# Patient Record
Sex: FEMALE | Race: WHITE | NOT HISPANIC OR LATINO | Employment: OTHER | URBAN - METROPOLITAN AREA
[De-identification: names, ages, dates, MRNs, and addresses within clinical notes are randomized per-mention and may not be internally consistent; named-entity substitution may affect disease eponyms.]

---

## 2017-01-02 ENCOUNTER — PATIENT MESSAGE (OUTPATIENT)
Dept: FAMILY MEDICINE | Facility: CLINIC | Age: 62
End: 2017-01-02

## 2017-01-04 ENCOUNTER — OFFICE VISIT (OUTPATIENT)
Dept: FAMILY MEDICINE | Facility: CLINIC | Age: 62
End: 2017-01-04
Payer: COMMERCIAL

## 2017-01-04 ENCOUNTER — LAB VISIT (OUTPATIENT)
Dept: LAB | Facility: HOSPITAL | Age: 62
End: 2017-01-04
Attending: FAMILY MEDICINE
Payer: COMMERCIAL

## 2017-01-04 VITALS
BODY MASS INDEX: 33.76 KG/M2 | OXYGEN SATURATION: 94 % | TEMPERATURE: 98 F | HEIGHT: 62 IN | WEIGHT: 183.44 LBS | RESPIRATION RATE: 20 BRPM | HEART RATE: 103 BPM

## 2017-01-04 DIAGNOSIS — E11.9 TYPE 2 DIABETES MELLITUS WITHOUT COMPLICATION, WITHOUT LONG-TERM CURRENT USE OF INSULIN: ICD-10-CM

## 2017-01-04 DIAGNOSIS — E11.9 TYPE 2 DIABETES MELLITUS WITHOUT COMPLICATION: ICD-10-CM

## 2017-01-04 DIAGNOSIS — J44.1 ACUTE EXACERBATION OF CHRONIC OBSTRUCTIVE PULMONARY DISEASE (COPD): Primary | ICD-10-CM

## 2017-01-04 DIAGNOSIS — Z23 NEED FOR VACCINATION WITH 13-POLYVALENT PNEUMOCOCCAL CONJUGATE VACCINE: ICD-10-CM

## 2017-01-04 DIAGNOSIS — Z12.11 COLON CANCER SCREENING: ICD-10-CM

## 2017-01-04 DIAGNOSIS — Z01.419 ROUTINE GYNECOLOGICAL EXAMINATION: ICD-10-CM

## 2017-01-04 DIAGNOSIS — I10 ESSENTIAL HYPERTENSION: ICD-10-CM

## 2017-01-04 LAB
CHOLEST/HDLC SERPL: 4.4 {RATIO}
HDL/CHOLESTEROL RATIO: 22.8 %
HDLC SERPL-MCNC: 224 MG/DL
HDLC SERPL-MCNC: 51 MG/DL
LDLC SERPL CALC-MCNC: 143.8 MG/DL
NONHDLC SERPL-MCNC: 173 MG/DL
TRIGL SERPL-MCNC: 146 MG/DL

## 2017-01-04 PROCEDURE — 3044F HG A1C LEVEL LT 7.0%: CPT | Mod: S$GLB,,, | Performed by: FAMILY MEDICINE

## 2017-01-04 PROCEDURE — 99214 OFFICE O/P EST MOD 30 MIN: CPT | Mod: 25,S$GLB,, | Performed by: FAMILY MEDICINE

## 2017-01-04 PROCEDURE — 4010F ACE/ARB THERAPY RXD/TAKEN: CPT | Mod: S$GLB,,, | Performed by: FAMILY MEDICINE

## 2017-01-04 PROCEDURE — 90471 IMMUNIZATION ADMIN: CPT | Mod: 59,S$GLB,, | Performed by: FAMILY MEDICINE

## 2017-01-04 PROCEDURE — 1159F MED LIST DOCD IN RCRD: CPT | Mod: S$GLB,,, | Performed by: FAMILY MEDICINE

## 2017-01-04 PROCEDURE — 99999 PR PBB SHADOW E&M-EST. PATIENT-LVL III: CPT | Mod: PBBFAC,,, | Performed by: FAMILY MEDICINE

## 2017-01-04 PROCEDURE — 80061 LIPID PANEL: CPT

## 2017-01-04 PROCEDURE — 83036 HEMOGLOBIN GLYCOSYLATED A1C: CPT

## 2017-01-04 PROCEDURE — 2022F DILAT RTA XM EVC RTNOPTHY: CPT | Mod: S$GLB,,, | Performed by: FAMILY MEDICINE

## 2017-01-04 PROCEDURE — 36415 COLL VENOUS BLD VENIPUNCTURE: CPT

## 2017-01-04 PROCEDURE — 90670 PCV13 VACCINE IM: CPT | Mod: S$GLB,,, | Performed by: FAMILY MEDICINE

## 2017-01-04 RX ORDER — PREDNISONE 10 MG/1
TABLET ORAL
Refills: 0 | COMMUNITY
Start: 2016-12-30 | End: 2017-01-04

## 2017-01-04 RX ORDER — HYDROCODONE BITARTRATE AND ACETAMINOPHEN 5; 325 MG/1; MG/1
TABLET ORAL
Refills: 0 | COMMUNITY
Start: 2016-12-30 | End: 2017-03-23 | Stop reason: ALTCHOICE

## 2017-01-04 RX ORDER — BENZONATATE 100 MG/1
CAPSULE ORAL
Refills: 1 | COMMUNITY
Start: 2016-12-30 | End: 2017-03-23 | Stop reason: ALTCHOICE

## 2017-01-04 RX ORDER — FLUTICASONE PROPIONATE 50 MCG
SPRAY, SUSPENSION (ML) NASAL
Refills: 0 | COMMUNITY
Start: 2016-12-30 | End: 2017-03-23

## 2017-01-04 RX ORDER — PREDNISONE 20 MG/1
60 TABLET ORAL DAILY
Qty: 15 TABLET | Refills: 0 | Status: SHIPPED | OUTPATIENT
Start: 2017-01-04 | End: 2017-01-09

## 2017-01-04 NOTE — MR AVS SNAPSHOT
[facility]  [address]  [place] [address]  Phone: [phone]                  Hannah Montoya   2017 7:30 AM   Office Visit    Description:  Female : 1955   Provider:  Emanuel Chavez MD   Department:  [facility]           Reason for Visit     Urgent Care Follow-up           Diagnoses this Visit        Comments    Acute exacerbation of chronic obstructive pulmonary disease (COPD)    -  Primary     Essential hypertension         Type 2 diabetes mellitus without complication, without long-term current use of insulin         Colon cancer screening         Need for vaccination with 13-polyvalent pneumococcal conjugate vaccine         Routine gynecological examination                To Do List           Future Appointments        Provider Department Dept Phone    2017 9:00 AM LAB, [facility] DRAW STATION [facility] [phone]      Goals (5 Years of Data)     None      Follow-Up and Disposition     Return in about 3 months (around 2017), or if symptoms worsen or fail to improve.       These Medications        Disp Refills Start End    predniSONE (DELTASONE) 20 MG tablet 15 tablet 0 2017    Take 3 tablets (60 mg total) by mouth once daily. - Oral    Pharmacy: [facility] [place] [address] #: [phone]         [facility] On Call     [facility] On Call Nurse Care Line -  Assistance  Registered nurses in the [facility] On Call Center provide clinical advisement, health education, appointment booking, and other advisory services.  Call for this free service at [phone].             Medications           Message regarding Medications     Verify the changes and/or additions to your medication regime listed below are the same as discussed with your clinician today.  If any of these changes or additions are incorrect, please notify your healthcare provider.         START taking these NEW medications        Refills    predniSONE (DELTASONE) 20 MG tablet 0    Sig: Take 3 tablets (60 mg total) by mouth once daily.    Class: Normal    Route: Oral      STOP taking these medications     NICOTROL 10 mg Crtg inhale 1 PUFF into the lungs AS NEEDED    ondansetron (ZOFRAN-ODT) 4 MG TbDL Take 1 tablet (4 mg total) by mouth every 6 (six) hours as needed.           Verify that the below list of medications is an accurate representation of the medications you are currently taking.  If none reported, the list may be blank. If incorrect, please contact your healthcare provider. Carry this list with you in case of emergency.           Current Medications     albuterol (ACCUNEB) 1.25 mg/3 mL Nebu Use 1 vial IN NEBULIZER EVERY 6 HOURS AS NEEDED    albuterol-ipratropium 2.5mg-0.5mg/3mL (DUO-NEB) 0.5 mg-3 mg(2.5 mg base)/3 mL nebulizer solution     alprazolam (XANAX) 0.25 MG tablet TAKE 1 TABLET(0.25 MG) BY MOUTH DAILY AS NEEDED FOR ANXIETY    amlodipine (NORVASC) 5 MG tablet TAKE ONE TABLET BY MOUTH EVERY DAY    atorvastatin (LIPITOR) 10 MG tablet TAKE ONE TABLET BY MOUTH DAILY    azithromycin (ZITHROMAX Z-RICKY) 250 MG tablet Take 2 pills day 1, then 1 pill day 2-5.    benzonatate (TESSALON) 100 MG capsule     budesonide (PULMICORT FLEXHALER) 90 mcg/actuation AePB Inhale 2 puffs (180 mcg total) into the lungs 2 (two) times daily.    fluticasone (FLONASE) 50 mcg/actuation nasal spray USE 2 SPR IN EACH NOSTRIL D    hydrocodone-acetaminophen 5-325mg (NORCO) 5-325 mg per tablet TK 1 T PO Q 6 H PRN P OR COUGH    lisinopril-hydrochlorothiazide (PRINZIDE,ZESTORETIC) 20-12.5 mg per tablet TAKE TWO TABLETS BY MOUTH EVERY DAY    metformin (GLUCOPHAGE) 500 MG tablet TAKE ONE TABLET BY MOUTH TWICE DAILY    NTS STEP 1 21 mg/24 hr PLACE 1 PATCH ONTO THE SKIN ONCE DAILY.    polyethylene glycol (COLYTE) 240-22.72-6.72 -5.84 gram SolR Take 4,000 mLs (4 L total) by mouth as directed.    PROAIR HFA 90 mcg/actuation  "inhaler INHALE 2 PUFFS BY MOUTH EVERY 6 HOURS AS NEEDED FOR WHEEZING    sertraline (ZOLOFT) 100 MG tablet TAKE ONE Tablet BY MOUTH EVERY EVENING    STIOLTO RESPIMAT 2.5-2.5 mcg/actuation Mist USE 2 PUFFS BY MOUTH DAILY    TRUETEST TEST STRIPS Strp test once EVERY MORNING    predniSONE (DELTASONE) 20 MG tablet Take 3 tablets (60 mg total) by mouth once daily.           Clinical Reference Information           Vital Signs - Last Recorded  Most recent update: 1/4/2017  7:36 AM by Vielka Puente MA    Pulse Temp Resp Ht Wt LMP    103 97.7 °F (36.5 °C) (Oral) 20 5' 2" (1.575 m) 83.2 kg (183 lb 6.8 oz) (LMP Unknown)    SpO2 BMI             (!) 94% 33.55 kg/m2         Allergies as of 1/4/2017     No Known Allergies      Immunizations Administered on Date of Encounter - 1/4/2017     Name Date Dose VIS Date Route    Pneumococcal Conjugate - 13 Valent 1/4/2017 0.5 mL 11/5/2015 Intramuscular      Orders Placed During Today's Visit      Normal Orders This Visit    Ambulatory referral to Obstetrics / Gynecology     Case request GI: COLONOSCOPY     Hemoglobin A1c     Microalbumin/creatinine urine ratio     Pneumococcal Conjugate Vaccine (13 Valent) (IM)       "

## 2017-01-04 NOTE — PROGRESS NOTES
Routine Office Visit    Patient Name: Hannah Montoya    : 1955  MRN: [id number]    Subjective:  Hannah is a 61 y.o. female who presents today for:    1. COPD exacerbation  Patient continues to have wheezing and shortness of breath after being treated ineffectively by urgent care.  She reports getting a shot of abx, a shot of steroid, a zpack, and 10mg prednisone daily for 5 days.  She has not had any fever.  She has been using inhalers as prescribed.  Patient has no shortness of breath at rest, but does have it with exertion.  She denies a productive cough at this time.  She reports having flu shot this year.  She is a reformed smoker, but is exposed to second hand smoke at home daily.        Past Medical History  Past Medical History   Diagnosis Date    Breast cancer     COPD (chronic obstructive pulmonary disease)     Diabetes mellitus, type 2     Hyperglycemia     Hyperlipidemia        Past Surgical History  Past Surgical History   Procedure Laterality Date    Cholecystectomy       section       x2    Tubal ligation      Breast biopsy Right 2016     [facility]    Mastectomy w/ sentinel node biopsy      Breast surgery         Family History  Family History   Problem Relation Age of Onset    Kidney disease Mother     Kidney disease Father     Clotting disorder Brother     Breast cancer Neg Hx     Ovarian cancer Neg Hx        Social History  Social History     Social History    Marital status:      Spouse name: N/A    Number of children: N/A    Years of education: N/A     Occupational History     [employer]     Social History Main Topics    Smoking status: Former Smoker     Packs/day: 0.50     Years: 40.00     Quit date: 2016    Smokeless tobacco: Not on file    Alcohol use No      Comment: rare- holiday    Drug use: No    Sexual activity: Not on file     Other Topics Concern    Not on file     Social History Narrative       Current Medications  Current  Outpatient Prescriptions on File Prior to Visit   Medication Sig Dispense Refill    albuterol (ACCUNEB) 1.25 mg/3 mL Nebu Use 1 vial IN NEBULIZER EVERY 6 HOURS AS NEEDED 300 mL 6    albuterol-ipratropium 2.5mg-0.5mg/3mL (DUO-NEB) 0.5 mg-3 mg(2.5 mg base)/3 mL nebulizer solution       alprazolam (XANAX) 0.25 MG tablet TAKE 1 TABLET(0.25 MG) BY MOUTH DAILY AS NEEDED FOR ANXIETY 20 tablet 0    amlodipine (NORVASC) 5 MG tablet TAKE ONE TABLET BY MOUTH EVERY DAY 30 tablet 0    atorvastatin (LIPITOR) 10 MG tablet TAKE ONE TABLET BY MOUTH DAILY 30 tablet 6    azithromycin (ZITHROMAX Z-RICKY) 250 MG tablet Take 2 pills day 1, then 1 pill day 2-5. 6 tablet 0    budesonide (PULMICORT FLEXHALER) 90 mcg/actuation AePB Inhale 2 puffs (180 mcg total) into the lungs 2 (two) times daily. 1 each 6    lisinopril-hydrochlorothiazide (PRINZIDE,ZESTORETIC) 20-12.5 mg per tablet TAKE TWO TABLETS BY MOUTH EVERY DAY 60 tablet 0    metformin (GLUCOPHAGE) 500 MG tablet TAKE ONE TABLET BY MOUTH TWICE DAILY 60 tablet 0    NTS STEP 1 21 mg/24 hr PLACE 1 PATCH ONTO THE SKIN ONCE DAILY. 28 patch 0    polyethylene glycol (COLYTE) 240-22.72-6.72 -5.84 gram SolR Take 4,000 mLs (4 L total) by mouth as directed. 1 Bottle 0    PROAIR HFA 90 mcg/actuation inhaler INHALE 2 PUFFS BY MOUTH EVERY 6 HOURS AS NEEDED FOR WHEEZING 17 g 2    sertraline (ZOLOFT) 100 MG tablet TAKE ONE Tablet BY MOUTH EVERY EVENING 90 tablet 1    STIOLTO RESPIMAT 2.5-2.5 mcg/actuation Mist USE 2 PUFFS BY MOUTH DAILY 4 g 0    TRUETEST TEST STRIPS Strp test once EVERY MORNING 90 each 3    [DISCONTINUED] NICOTROL 10 mg Crtg inhale 1 PUFF into the lungs AS NEEDED 168 each 1    [DISCONTINUED] ondansetron (ZOFRAN-ODT) 4 MG TbDL Take 1 tablet (4 mg total) by mouth every 6 (six) hours as needed. 20 tablet 0     No current facility-administered medications on file prior to visit.        Allergies   Review of patient's allergies indicates:  No Known Allergies    Review of  "Systems (Pertinent positives)  Review of Systems   Constitutional: Negative.    HENT: Negative.    Eyes: Negative.    Respiratory: Positive for cough, shortness of breath and wheezing. Negative for hemoptysis and sputum production.    Cardiovascular: Negative.    Gastrointestinal: Negative.    Genitourinary: Negative.    Musculoskeletal: Negative.    Skin: Negative.    Neurological: Negative.          Visit Vitals    Pulse 103    Temp 97.7 °F (36.5 °C) (Oral)    Resp 20    Ht 5' 2" (1.575 m)    Wt 83.2 kg (183 lb 6.8 oz)    LMP  (LMP Unknown)    SpO2 (!) 94%    BMI 33.55 kg/m2       GENERAL APPEARANCE: in no apparent distress and well developed and well nourished  HEENT: PERRL, EOMI, Sclera clear, anicteric, Oropharynx clear, no lesions, Neck supple with midline trachea  NECK: normal, supple, no adenopathy, thyroid normal in size  RESPIRATORY: appears well, vitals normal, no respiratory distress, acyanotic, normal RR, diffuse wheezing throughout; no crackles  HEART: regular rate and rhythm, S1, S2 normal, no murmur, click, rub or gallop.    ABDOMEN: abdomen is soft without tenderness, no masses, no hernias, no organomegaly, no rebound, no guarding. Suprapubic tenderness absent. No CVA tenderness.  SKIN: no rashes, no wounds, no other lesions  PSYCH: Alert, oriented x 3, thought content appropriate, speech normal, pleasant and cooperative, good eye contact, well groomed    Assessment/Plan:  Hannah Montoya is a 61 y.o. female who presents today for :    Hannah was seen today for urgent care follow-up.    Diagnoses and all orders for this visit:    Acute exacerbation of chronic obstructive pulmonary disease (COPD)  -     Pneumococcal Conjugate Vaccine (13 Valent) (IM)  -     predniSONE (DELTASONE) 20 MG tablet; Take 3 tablets (60 mg total) by mouth once daily.    Essential hypertension    Type 2 diabetes mellitus without complication, without long-term current use of insulin  -     Hemoglobin A1c  -     " Microalbumin/creatinine urine ratio    Colon cancer screening  -     Case request GI: COLONOSCOPY    Need for vaccination with 13-polyvalent pneumococcal conjugate vaccine    Routine gynecological examination  -     Ambulatory referral to Obstetrics / Gynecology      1.  Patient to take medication as prescribed  2.  She is to call if no improvement in symptoms or go to the ED should they worsen  3.  prevnar given  4.  Ordered colonoscopy as she is due  5.  referredt OBGYN as she has not had a pap or routine GYN exam in over 10 years (pos hx of breast CA treated last year)  6.  Diabetic labs ordered      Emanuel Chavez MD

## 2017-01-04 NOTE — PROGRESS NOTES
Patient received Pneumococcal 13 vaccine and tolerated it well. Patient received VIS information. Patient advise to wait 15 min for possible reactions.

## 2017-01-05 ENCOUNTER — TELEPHONE (OUTPATIENT)
Dept: FAMILY MEDICINE | Facility: CLINIC | Age: 62
End: 2017-01-05

## 2017-01-05 LAB
ESTIMATED AVG GLUCOSE: 131 MG/DL
HBA1C MFR BLD HPLC: 6.2 %

## 2017-01-06 RX ORDER — AMLODIPINE BESYLATE 5 MG/1
TABLET ORAL
Qty: 30 TABLET | Refills: 2 | Status: SHIPPED | OUTPATIENT
Start: 2017-01-06 | End: 2017-03-30 | Stop reason: SDUPTHER

## 2017-01-06 RX ORDER — METFORMIN HYDROCHLORIDE 500 MG/1
TABLET ORAL
Qty: 60 TABLET | Refills: 2 | Status: SHIPPED | OUTPATIENT
Start: 2017-01-06 | End: 2017-03-30 | Stop reason: SDUPTHER

## 2017-01-06 RX ORDER — LISINOPRIL AND HYDROCHLOROTHIAZIDE 12.5; 2 MG/1; MG/1
TABLET ORAL
Qty: 60 TABLET | Refills: 2 | Status: SHIPPED | OUTPATIENT
Start: 2017-01-06 | End: 2017-03-30 | Stop reason: SDUPTHER

## 2017-01-11 ENCOUNTER — PATIENT MESSAGE (OUTPATIENT)
Dept: FAMILY MEDICINE | Facility: CLINIC | Age: 62
End: 2017-01-11

## 2017-01-13 ENCOUNTER — PATIENT MESSAGE (OUTPATIENT)
Dept: FAMILY MEDICINE | Facility: CLINIC | Age: 62
End: 2017-01-13

## 2017-01-27 RX ORDER — ALBUTEROL SULFATE 90 UG/1
AEROSOL, METERED RESPIRATORY (INHALATION)
Qty: 17 G | Refills: 0 | Status: SHIPPED | OUTPATIENT
Start: 2017-01-27 | End: 2017-03-01 | Stop reason: SDUPTHER

## 2017-02-01 DIAGNOSIS — J44.1 COPD WITH ACUTE EXACERBATION: ICD-10-CM

## 2017-02-01 RX ORDER — TIOTROPIUM BROMIDE AND OLODATEROL 3.124; 2.736 UG/1; UG/1
SPRAY, METERED RESPIRATORY (INHALATION)
Qty: 4 G | Refills: 0 | Status: SHIPPED | OUTPATIENT
Start: 2017-02-01 | End: 2017-03-01

## 2017-02-22 ENCOUNTER — PATIENT MESSAGE (OUTPATIENT)
Dept: FAMILY MEDICINE | Facility: CLINIC | Age: 62
End: 2017-02-22

## 2017-02-25 ENCOUNTER — PATIENT MESSAGE (OUTPATIENT)
Dept: FAMILY MEDICINE | Facility: CLINIC | Age: 62
End: 2017-02-25

## 2017-02-25 DIAGNOSIS — J41.0 SIMPLE CHRONIC BRONCHITIS: Primary | ICD-10-CM

## 2017-03-01 RX ORDER — ALBUTEROL SULFATE 90 UG/1
2 AEROSOL, METERED RESPIRATORY (INHALATION) EVERY 6 HOURS PRN
Qty: 17 G | Refills: 5 | Status: SHIPPED | OUTPATIENT
Start: 2017-03-01 | End: 2017-12-12 | Stop reason: SDUPTHER

## 2017-03-01 RX ORDER — TIOTROPIUM BROMIDE 18 UG/1
18 CAPSULE ORAL; RESPIRATORY (INHALATION) DAILY
Qty: 30 CAPSULE | Refills: 5 | Status: SHIPPED | OUTPATIENT
Start: 2017-03-01 | End: 2017-03-03

## 2017-03-01 RX ORDER — FLUTICASONE PROPIONATE AND SALMETEROL 250; 50 UG/1; UG/1
1 POWDER RESPIRATORY (INHALATION) 2 TIMES DAILY
Qty: 1 EACH | Refills: 5 | Status: SHIPPED | OUTPATIENT
Start: 2017-03-01 | End: 2017-03-03

## 2017-03-03 ENCOUNTER — OFFICE VISIT (OUTPATIENT)
Dept: FAMILY MEDICINE | Facility: CLINIC | Age: 62
End: 2017-03-03
Payer: COMMERCIAL

## 2017-03-03 VITALS
TEMPERATURE: 99 F | BODY MASS INDEX: 34.48 KG/M2 | DIASTOLIC BLOOD PRESSURE: 78 MMHG | OXYGEN SATURATION: 94 % | HEIGHT: 62 IN | WEIGHT: 187.38 LBS | RESPIRATION RATE: 18 BRPM | SYSTOLIC BLOOD PRESSURE: 124 MMHG | HEART RATE: 100 BPM

## 2017-03-03 DIAGNOSIS — J42 CHRONIC BRONCHITIS, UNSPECIFIED CHRONIC BRONCHITIS TYPE: ICD-10-CM

## 2017-03-03 DIAGNOSIS — J44.1 COPD WITH ACUTE EXACERBATION: ICD-10-CM

## 2017-03-03 PROCEDURE — 94640 AIRWAY INHALATION TREATMENT: CPT | Mod: S$GLB,,, | Performed by: FAMILY MEDICINE

## 2017-03-03 PROCEDURE — 99999 PR PBB SHADOW E&M-EST. PATIENT-LVL III: CPT | Mod: PBBFAC,,, | Performed by: FAMILY MEDICINE

## 2017-03-03 PROCEDURE — 3078F DIAST BP <80 MM HG: CPT | Mod: S$GLB,,, | Performed by: FAMILY MEDICINE

## 2017-03-03 PROCEDURE — 1160F RVW MEDS BY RX/DR IN RCRD: CPT | Mod: S$GLB,,, | Performed by: FAMILY MEDICINE

## 2017-03-03 PROCEDURE — 3074F SYST BP LT 130 MM HG: CPT | Mod: S$GLB,,, | Performed by: FAMILY MEDICINE

## 2017-03-03 PROCEDURE — 99214 OFFICE O/P EST MOD 30 MIN: CPT | Mod: 25,S$GLB,, | Performed by: FAMILY MEDICINE

## 2017-03-03 RX ORDER — TIOTROPIUM BROMIDE AND OLODATEROL 3.124; 2.736 UG/1; UG/1
SPRAY, METERED RESPIRATORY (INHALATION)
Qty: 4 G | Refills: 0 | Status: SHIPPED | OUTPATIENT
Start: 2017-03-03 | End: 2017-03-31 | Stop reason: SDUPTHER

## 2017-03-03 RX ORDER — ALBUTEROL SULFATE 1.25 MG/3ML
1.25 SOLUTION RESPIRATORY (INHALATION)
Status: COMPLETED | OUTPATIENT
Start: 2017-03-03 | End: 2017-03-03

## 2017-03-03 RX ADMIN — ALBUTEROL SULFATE 1.25 MG: 1.25 SOLUTION RESPIRATORY (INHALATION) at 01:03

## 2017-03-03 NOTE — PROGRESS NOTES
Routine Office Visit    Patient Name: Hannah Montoya    : 1955  MRN: [id number]    Subjective:  Hannah is a 61 y.o. female who presents today for:    1. COPD  Patient presenting today mainly to discuss her medications.  She states that the medications prescribed are too expenisve and were going to cost $800 as she has a $6600 deductible.  Patient cannot afford this, so would like to go back to original meds.  There has been a dry cough.  She has had some shortness of breath recently.  She denies any fever or chills.  She has been taking her rescue inhaler as prescribed and over the past 2 days has used it 2-3 times a day.  No hemoptysis.     Past Medical History  Past Medical History:   Diagnosis Date    Breast cancer     COPD (chronic obstructive pulmonary disease)     Diabetes mellitus, type 2     Hyperglycemia     Hyperlipidemia        Past Surgical History  Past Surgical History:   Procedure Laterality Date    BREAST BIOPSY Right 2016    [facility]    BREAST SURGERY       SECTION      x2    CHOLECYSTECTOMY      MASTECTOMY W/ SENTINEL NODE BIOPSY      TUBAL LIGATION         Family History  Family History   Problem Relation Age of Onset    Kidney disease Mother     Kidney disease Father     Clotting disorder Brother     Breast cancer Neg Hx     Ovarian cancer Neg Hx        Social History  Social History     Social History    Marital status:      Spouse name: N/A    Number of children: N/A    Years of education: N/A     Occupational History     [employer]     Social History Main Topics    Smoking status: Former Smoker     Packs/day: 0.50     Years: 40.00     Quit date: 2016    Smokeless tobacco: Not on file    Alcohol use No      Comment: rare- holiday    Drug use: No    Sexual activity: Not on file     Other Topics Concern    Not on file     Social History Narrative       Current Medications  Current Outpatient Prescriptions on File Prior to Visit    Medication Sig Dispense Refill    albuterol (ACCUNEB) 1.25 mg/3 mL Nebu Use 1 vial IN NEBULIZER EVERY 6 HOURS AS NEEDED 300 mL 6    albuterol (PROAIR HFA) 90 mcg/actuation inhaler Inhale 2 puffs into the lungs every 6 (six) hours as needed. Rescue 17 g 5    albuterol-ipratropium 2.5mg-0.5mg/3mL (DUO-NEB) 0.5 mg-3 mg(2.5 mg base)/3 mL nebulizer solution       alprazolam (XANAX) 0.25 MG tablet TAKE 1 TABLET(0.25 MG) BY MOUTH DAILY AS NEEDED FOR ANXIETY 20 tablet 0    amlodipine (NORVASC) 5 MG tablet TAKE ONE TABLET BY MOUTH EVERY DAY 30 tablet 2    atorvastatin (LIPITOR) 10 MG tablet TAKE ONE TABLET BY MOUTH DAILY 30 tablet 6    benzonatate (TESSALON) 100 MG capsule   1    fluticasone (FLONASE) 50 mcg/actuation nasal spray USE 2 SPR IN EACH NOSTRIL D  0    hydrocodone-acetaminophen 5-325mg (NORCO) 5-325 mg per tablet TK 1 T PO Q 6 H PRN P OR COUGH  0    lisinopril-hydrochlorothiazide (PRINZIDE,ZESTORETIC) 20-12.5 mg per tablet TAKE TWO TABLETS BY MOUTH EVERY DAY 60 tablet 2    metformin (GLUCOPHAGE) 500 MG tablet TAKE ONE TABLET BY MOUTH TWICE DAILY 60 tablet 2    NTS STEP 1 21 mg/24 hr PLACE 1 PATCH ONTO THE SKIN ONCE DAILY. 28 patch 0    polyethylene glycol (COLYTE) 240-22.72-6.72 -5.84 gram SolR Take 4,000 mLs (4 L total) by mouth as directed. 1 Bottle 0    sertraline (ZOLOFT) 100 MG tablet TAKE ONE Tablet BY MOUTH EVERY EVENING 90 tablet 1    TRUETEST TEST STRIPS Strp test once EVERY MORNING 90 each 3    [DISCONTINUED] budesonide (PULMICORT FLEXHALER) 90 mcg/actuation AePB Inhale 2 puffs (180 mcg total) into the lungs 2 (two) times daily. 1 each 6    [DISCONTINUED] fluticasone-salmeterol 250-50 mcg/dose (ADVAIR) 250-50 mcg/dose diskus inhaler Inhale 1 puff into the lungs 2 (two) times daily. Controller 1 each 5    [DISCONTINUED] tiotropium (SPIRIVA) 18 mcg inhalation capsule Inhale 1 capsule (18 mcg total) into the lungs once daily. Controller 30 capsule 5    [DISCONTINUED] azithromycin  "(ZITHROMAX Z-RICKY) 250 MG tablet Take 2 pills day 1, then 1 pill day 2-5. 6 tablet 0     No current facility-administered medications on file prior to visit.        Allergies   Review of patient's allergies indicates:  No Known Allergies    Review of Systems (Pertinent positives)  Review of Systems   HENT: Negative.    Eyes: Negative.    Respiratory: Positive for cough, shortness of breath and wheezing. Negative for hemoptysis and sputum production.    Cardiovascular: Negative.    Gastrointestinal: Negative.    Skin: Negative.          /78 (BP Location: Left arm, Patient Position: Sitting, BP Method: Manual)  Pulse 100  Temp 98.5 °F (36.9 °C) (Oral)   Resp 18  Ht 5' 2" (1.575 m)  Wt 85 kg (187 lb 6.3 oz)  LMP  (LMP Unknown)  SpO2 (!) 94%  BMI 34.27 kg/m2    GENERAL APPEARANCE: in no apparent distress and well developed and well nourished  HEENT: PERRLA, EOMI, Sclera clear, anicteric, Oropharynx clear, no lesions, Neck supple with midline trachea  NECK: normal, supple, no adenopathy, thyroid normal in size  RESPIRATORY: appears well, vitals normal, no respiratory distress, acyanotic, normal RR, mild wheezing without crackles prior to nebulizer treatment.  Wheezing improved signficantly after treatment  HEART: regular rate and rhythm, S1, S2 normal, no murmur, click, rub or gallop.    ABDOMEN: abdomen is soft without tenderness, no masses, no hernias, no organomegaly, no rebound, no guarding. Suprapubic tenderness absent. No CVA tenderness.  SKIN: no rashes, no wounds, no other lesions  PSYCH: Alert, oriented x 3, thought content appropriate, speech normal, pleasant and cooperative, good eye contact, well groomed    Assessment/Plan:  Hannah Montoya is a 61 y.o. female who presents today for :    Hannah was seen today for medication consult.    Diagnoses and all orders for this visit:    Chronic bronchitis, unspecified chronic bronchitis type  -     budesonide (PULMICORT FLEXHALER) 90 mcg/actuation " AePB; Inhale 2 puffs (180 mcg total) into the lungs 2 (two) times daily.  -     albuterol nebulizer solution 1.25 mg; Take 3 mLs (1.25 mg total) by nebulization one time.      1.  Patient to take all medications as prescribed  2.  Follow up as needed  3.  Go to the ED for any increased shortness of breath  4.  Patient to call with any concerns    Emanuel Chavez MD

## 2017-03-03 NOTE — MR AVS SNAPSHOT
[facility]  [address]  [place] [address]  Phone: [phone]                  Hannah Montoya   3/3/2017 1:00 PM   Office Visit    Description:  Female : 1955   Provider:  Emanuel Chavez MD   Department:  [facility]           Reason for Visit     Medication Consult           Diagnoses this Visit        Comments    Chronic bronchitis, unspecified chronic bronchitis type                To Do List           Goals (5 Years of Data)     None      Follow-Up and Disposition     Return if symptoms worsen or fail to improve.       These Medications        Disp Refills Start End    budesonide (PULMICORT FLEXHALER) 90 mcg/actuation AePB 1 each 6 3/3/2017 3/3/2018    Inhale 2 puffs (180 mcg total) into the lungs 2 (two) times daily. - Inhalation    Pharmacy: [facility] - [place] [address] #: [phone]         [facility] On Call     [facility] On Call Nurse Care Line -  Assistance  Registered nurses in the [facility] On Call Center provide clinical advisement, health education, appointment booking, and other advisory services.  Call for this free service at [phone].             Medications           Message regarding Medications     Verify the changes and/or additions to your medication regime listed below are the same as discussed with your clinician today.  If any of these changes or additions are incorrect, please notify your healthcare provider.        These medications were administered today        Dose Freq    albuterol nebulizer solution 1.25 mg 1.25 mg Clinic/HOD 1 time    Sig: Take 3 mLs (1.25 mg total) by nebulization one time.    Class: Normal    Route: Nebulization      STOP taking these medications     azithromycin (ZITHROMAX Z-RICKY) 250 MG tablet Take 2 pills day 1, then 1 pill day 2-5.    fluticasone-salmeterol 250-50 mcg/dose (ADVAIR) 250-50 mcg/dose diskus inhaler Inhale 1 puff into the lungs 2  (two) times daily. Controller    tiotropium (SPIRIVA) 18 mcg inhalation capsule Inhale 1 capsule (18 mcg total) into the lungs once daily. Controller           Verify that the below list of medications is an accurate representation of the medications you are currently taking.  If none reported, the list may be blank. If incorrect, please contact your healthcare provider. Carry this list with you in case of emergency.           Current Medications     albuterol (ACCUNEB) 1.25 mg/3 mL Nebu Use 1 vial IN NEBULIZER EVERY 6 HOURS AS NEEDED    albuterol (PROAIR HFA) 90 mcg/actuation inhaler Inhale 2 puffs into the lungs every 6 (six) hours as needed. Rescue    albuterol-ipratropium 2.5mg-0.5mg/3mL (DUO-NEB) 0.5 mg-3 mg(2.5 mg base)/3 mL nebulizer solution     alprazolam (XANAX) 0.25 MG tablet TAKE 1 TABLET(0.25 MG) BY MOUTH DAILY AS NEEDED FOR ANXIETY    amlodipine (NORVASC) 5 MG tablet TAKE ONE TABLET BY MOUTH EVERY DAY    atorvastatin (LIPITOR) 10 MG tablet TAKE ONE TABLET BY MOUTH DAILY    benzonatate (TESSALON) 100 MG capsule     budesonide (PULMICORT FLEXHALER) 90 mcg/actuation AePB Inhale 2 puffs (180 mcg total) into the lungs 2 (two) times daily.    fluticasone (FLONASE) 50 mcg/actuation nasal spray USE 2 SPR IN EACH NOSTRIL D    hydrocodone-acetaminophen 5-325mg (NORCO) 5-325 mg per tablet TK 1 T PO Q 6 H PRN P OR COUGH    lisinopril-hydrochlorothiazide (PRINZIDE,ZESTORETIC) 20-12.5 mg per tablet TAKE TWO TABLETS BY MOUTH EVERY DAY    metformin (GLUCOPHAGE) 500 MG tablet TAKE ONE TABLET BY MOUTH TWICE DAILY    NTS STEP 1 21 mg/24 hr PLACE 1 PATCH ONTO THE SKIN ONCE DAILY.    polyethylene glycol (COLYTE) 240-22.72-6.72 -5.84 gram SolR Take 4,000 mLs (4 L total) by mouth as directed.    sertraline (ZOLOFT) 100 MG tablet TAKE ONE Tablet BY MOUTH EVERY EVENING    TRUETEST TEST STRIPS Strp test once EVERY MORNING    STIOLTO RESPIMAT 2.5-2.5 mcg/actuation Mist INHALE 2 PUFFS BY MOUTH DAILY           Clinical Reference  "Information           Your Vitals Were     BP Pulse Temp Resp Height Weight    124/78 (BP Location: Left arm, Patient Position: Sitting, BP Method: Manual) 100 98.5 °F (36.9 °C) (Oral) 18 5' 2" (1.575 m) 85 kg (187 lb 6.3 oz)    Last Period SpO2 BMI          (LMP Unknown) 94% 34.27 kg/m2        Blood Pressure          Most Recent Value    BP  124/78      Allergies as of 3/3/2017     No Known Allergies      Immunizations Administered on Date of Encounter - 3/3/2017     None      Administrations This Visit     albuterol nebulizer solution 1.25 mg     Admin Date Action Dose Route Administered By             03/03/2017 Given 1.25 mg Nebulization Hannah Gonzalez LPN                      Language Assistance Services     ATTENTION: Language assistance services are available, free of charge. Please call [phone].      ATENCIÓN: Si habla kierra, tiene a terry disposición servicios gratuitos de asistencia lingüística. Llame al [phone].     CHÚ Ý: N?u b?n nói Ti?ng Vi?t, có các d?ch v? h? tr? ngôn ng? mi?n phí dành cho b?n. G?i s? [phone].         [facility] complies with applicable Federal civil rights laws and does not discriminate on the basis of race, color, national origin, age, disability, or sex.        "

## 2017-03-03 NOTE — PROGRESS NOTES
Patient tolerated Albuterol nebulizer treatment without distress. Patient advise to wait 15 min for any possible reactions.

## 2017-03-04 ENCOUNTER — PATIENT MESSAGE (OUTPATIENT)
Dept: FAMILY MEDICINE | Facility: CLINIC | Age: 62
End: 2017-03-04

## 2017-03-04 DIAGNOSIS — J42 CHRONIC BRONCHITIS, UNSPECIFIED CHRONIC BRONCHITIS TYPE: ICD-10-CM

## 2017-03-07 ENCOUNTER — TELEPHONE (OUTPATIENT)
Dept: FAMILY MEDICINE | Facility: CLINIC | Age: 62
End: 2017-03-07

## 2017-03-07 NOTE — TELEPHONE ENCOUNTER
----- Message from Sylvia Hawley sent at 3/7/2017  1:31 PM CST -----  Contact: PT  Pt is calling to speak with nurse in regards to medication, budesonide (PULMICORT FLEXHALER) 90 mcg/actuation AePB.    Pt can be reached at [phone].  Thank you

## 2017-03-07 NOTE — TELEPHONE ENCOUNTER
Patients inhaler is 90mcg per puff.  She is to do 2 puffs twice a day, so that is 180mcg twice a day.    Thanks,  Dr. Chavez

## 2017-03-07 NOTE — TELEPHONE ENCOUNTER
Patient said that when she went to the pharmacy she picked up script for Pulmicort and it read 90 mcg. She said that her last script for Pulmicort was 180 mcg. Patient wants to know which one is correct.

## 2017-03-09 ENCOUNTER — PATIENT MESSAGE (OUTPATIENT)
Dept: FAMILY MEDICINE | Facility: CLINIC | Age: 62
End: 2017-03-09

## 2017-03-09 DIAGNOSIS — J44.1 COPD WITH ACUTE EXACERBATION: Primary | ICD-10-CM

## 2017-03-09 RX ORDER — PREDNISONE 20 MG/1
60 TABLET ORAL DAILY
Qty: 15 TABLET | Refills: 0 | Status: SHIPPED | OUTPATIENT
Start: 2017-03-09 | End: 2017-03-14

## 2017-03-14 RX ORDER — SERTRALINE HYDROCHLORIDE 100 MG/1
100 TABLET, FILM COATED ORAL NIGHTLY
Qty: 90 TABLET | Refills: 1 | Status: SHIPPED | OUTPATIENT
Start: 2017-03-14 | End: 2017-06-29 | Stop reason: SDUPTHER

## 2017-03-23 ENCOUNTER — OFFICE VISIT (OUTPATIENT)
Dept: FAMILY MEDICINE | Facility: CLINIC | Age: 62
End: 2017-03-23
Payer: COMMERCIAL

## 2017-03-23 ENCOUNTER — APPOINTMENT (OUTPATIENT)
Dept: RADIOLOGY | Facility: HOSPITAL | Age: 62
End: 2017-03-23
Attending: FAMILY MEDICINE
Payer: COMMERCIAL

## 2017-03-23 VITALS
WEIGHT: 194 LBS | BODY MASS INDEX: 35.7 KG/M2 | HEART RATE: 112 BPM | SYSTOLIC BLOOD PRESSURE: 124 MMHG | DIASTOLIC BLOOD PRESSURE: 82 MMHG | TEMPERATURE: 97 F | RESPIRATION RATE: 17 BRPM | HEIGHT: 62 IN | OXYGEN SATURATION: 95 %

## 2017-03-23 DIAGNOSIS — J42 CHRONIC BRONCHITIS WITH ACUTE EXACERBATION: ICD-10-CM

## 2017-03-23 DIAGNOSIS — J20.9 CHRONIC BRONCHITIS WITH ACUTE EXACERBATION: ICD-10-CM

## 2017-03-23 DIAGNOSIS — J20.9 CHRONIC BRONCHITIS WITH ACUTE EXACERBATION: Primary | ICD-10-CM

## 2017-03-23 DIAGNOSIS — R06.00 DYSPNEA, UNSPECIFIED TYPE: Primary | ICD-10-CM

## 2017-03-23 DIAGNOSIS — J30.2 SEASONAL ALLERGIC RHINITIS, UNSPECIFIED ALLERGIC RHINITIS TRIGGER: ICD-10-CM

## 2017-03-23 DIAGNOSIS — R93.89 ABNORMAL CXR: ICD-10-CM

## 2017-03-23 DIAGNOSIS — J42 CHRONIC BRONCHITIS WITH ACUTE EXACERBATION: Primary | ICD-10-CM

## 2017-03-23 PROCEDURE — 71020 XR CHEST PA AND LATERAL: CPT | Mod: TC,PN

## 2017-03-23 PROCEDURE — 99214 OFFICE O/P EST MOD 30 MIN: CPT | Mod: S$GLB,,, | Performed by: FAMILY MEDICINE

## 2017-03-23 PROCEDURE — 3074F SYST BP LT 130 MM HG: CPT | Mod: S$GLB,,, | Performed by: FAMILY MEDICINE

## 2017-03-23 PROCEDURE — 99999 PR PBB SHADOW E&M-EST. PATIENT-LVL IV: CPT | Mod: PBBFAC,,, | Performed by: FAMILY MEDICINE

## 2017-03-23 PROCEDURE — 3079F DIAST BP 80-89 MM HG: CPT | Mod: S$GLB,,, | Performed by: FAMILY MEDICINE

## 2017-03-23 PROCEDURE — 71020 XR CHEST PA AND LATERAL: CPT | Mod: 26,,, | Performed by: RADIOLOGY

## 2017-03-23 PROCEDURE — 1160F RVW MEDS BY RX/DR IN RCRD: CPT | Mod: S$GLB,,, | Performed by: FAMILY MEDICINE

## 2017-03-23 RX ORDER — FLUTICASONE PROPIONATE 50 MCG
1 SPRAY, SUSPENSION (ML) NASAL 2 TIMES DAILY
Qty: 1 BOTTLE | Refills: 4 | Status: SHIPPED | OUTPATIENT
Start: 2017-03-23 | End: 2017-05-17 | Stop reason: CLARIF

## 2017-03-23 NOTE — PROGRESS NOTES
Answers for HPI/ROS submitted by the patient on 3/22/2017   Shortness of breath  Chronicity: chronic  Onset: more than 1 year ago  Frequency: constantly  Progression since onset: waxing and waning  Episode duration: 10 minutes  abdominal pain: No  chest pain: No  coryza: No  ear pain: No  headaches: No  hemoptysis: No  leg swelling: Yes  neck pain: No  PND: No  rash: No  sore throat: No  sputum production: Yes  syncope: No  vomiting: No  Aggravating factors: nothing, emotional upset, exercise, any activity, weather changes  Improvement on treatment: moderate  Risk factors for DVT/PE: no known risk factors  Treatments tried: nothing, beta agonist inhalers, cool air, ipratropium inhalers, oral steroids, rest  asthma: No  allergies: No  COPD: Yes  pneumonia: Yes  aspirin allergies: No  CAD: No  DVT: No  heart failure: No  PE: No  recent surgery: No  bronchiolitis: No  chronic lung disease: Yes  Routine Office Visit    Patient Name: Hannah Montoya    : 1955  MRN: [id number]    Subjective:  Hannah is a 61 y.o. female who presents today for:    1. Shortness of breath  Patient states that over the past 2 weeks she has been feeling more short of breath.  She was treated with orals steroids for COPD exacerbation and felt better for 3 days, but then started feeling bad again.  She is getting more short of breath more quickly.  She has quit smoking cigarettes, but does use a vape.  She states that she didn't know that could cause any issues.  She has been having problems with the costs of medications, so not sure what else can be done.  Her  also has COPD, but continues to smoke close to 1ppd.  He does smoke inside.    Past Medical History  Past Medical History:   Diagnosis Date    Breast cancer     COPD (chronic obstructive pulmonary disease)     Diabetes mellitus, type 2     Hyperglycemia     Hyperlipidemia        Past Surgical History  Past Surgical History:   Procedure Laterality Date    BRE[facility]  BIOPSY Right 2016    [facility]    BREAST SURGERY       SECTION      x2    CHOLECYSTECTOMY      MASTECTOMY W/ SENTINEL NODE BIOPSY      TUBAL LIGATION         Family History  Family History   Problem Relation Age of Onset    Kidney disease Mother     Kidney disease Father     Clotting disorder Brother     Breast cancer Neg Hx     Ovarian cancer Neg Hx        Social History  Social History     Social History    Marital status:      Spouse name: N/A    Number of children: N/A    Years of education: N/A     Occupational History     [employer]     Social History Main Topics    Smoking status: Former Smoker     Packs/day: 0.50     Years: 40.00     Quit date: 2016    Smokeless tobacco: Not on file    Alcohol use No      Comment: rare- holiday    Drug use: No    Sexual activity: Not on file     Other Topics Concern    Not on file     Social History Narrative       Current Medications  Current Outpatient Prescriptions on File Prior to Visit   Medication Sig Dispense Refill    albuterol (ACCUNEB) 1.25 mg/3 mL Nebu Use 1 vial IN NEBULIZER EVERY 6 HOURS AS NEEDED 300 mL 6    albuterol (PROAIR HFA) 90 mcg/actuation inhaler Inhale 2 puffs into the lungs every 6 (six) hours as needed. Rescue 17 g 5    albuterol-ipratropium 2.5mg-0.5mg/3mL (DUO-NEB) 0.5 mg-3 mg(2.5 mg base)/3 mL nebulizer solution       alprazolam (XANAX) 0.25 MG tablet TAKE 1 TABLET(0.25 MG) BY MOUTH DAILY AS NEEDED FOR ANXIETY 20 tablet 0    amlodipine (NORVASC) 5 MG tablet TAKE ONE TABLET BY MOUTH EVERY DAY 30 tablet 2    budesonide (PULMICORT FLEXHALER) 90 mcg/actuation AePB Inhale 2 puffs (180 mcg total) into the lungs 2 (two) times daily. 1 each 6    lisinopril-hydrochlorothiazide (PRINZIDE,ZESTORETIC) 20-12.5 mg per tablet TAKE TWO TABLETS BY MOUTH EVERY DAY 60 tablet 2    metformin (GLUCOPHAGE) 500 MG tablet TAKE ONE TABLET BY MOUTH TWICE DAILY 60 tablet 2    sertraline (ZOLOFT) 100 MG tablet Take 1 tablet  "(100 mg total) by mouth every evening. 90 tablet 1    STIOLTO RESPIMAT 2.5-2.5 mcg/actuation Mist INHALE 2 PUFFS BY MOUTH DAILY 4 g 0    TRUETEST TEST STRIPS Strp test once EVERY MORNING 90 each 3    NTS STEP 1 21 mg/24 hr PLACE 1 PATCH ONTO THE SKIN ONCE DAILY. 28 patch 0    polyethylene glycol (COLYTE) 240-22.72-6.72 -5.84 gram SolR Take 4,000 mLs (4 L total) by mouth as directed. 1 Bottle 0    [DISCONTINUED] atorvastatin (LIPITOR) 10 MG tablet TAKE ONE TABLET BY MOUTH DAILY 30 tablet 6    [DISCONTINUED] benzonatate (TESSALON) 100 MG capsule   1    [DISCONTINUED] fluticasone (FLONASE) 50 mcg/actuation nasal spray USE 2 SPR IN EACH NOSTRIL D  0    [DISCONTINUED] hydrocodone-acetaminophen 5-325mg (NORCO) 5-325 mg per tablet TK 1 T PO Q 6 H PRN P OR COUGH  0     No current facility-administered medications on file prior to visit.        Allergies   Review of patient's allergies indicates:  No Known Allergies    Review of Systems (Pertinent positives)  Review of Systems   Constitutional: Positive for malaise/fatigue.   HENT: Negative.    Eyes: Negative.    Respiratory: Positive for shortness of breath and wheezing.    Cardiovascular: Negative.    Gastrointestinal: Negative.    Genitourinary: Negative.    Musculoskeletal: Negative.    Skin: Negative.          /82 (BP Location: Right arm, Patient Position: Sitting, BP Method: Manual)  Pulse (!) 112  Temp 97.4 °F (36.3 °C) (Oral)   Resp 17  Ht 5' 2" (1.575 m)  Wt 88 kg (194 lb 0.1 oz)  LMP  (LMP Unknown)  SpO2 95%  BMI 35.48 kg/m2    GENERAL APPEARANCE: in no apparent distress and well developed and well nourished  HEENT: PERRL, EOMI, Sclera clear, anicteric, Oropharynx clear, no lesions, Neck supple with midline trachea  NECK: normal, supple, no adenopathy, thyroid normal in size  RESPIRATORY: appears well, vitals normal, no respiratory distress, acyanotic, normal RR, chest clear, no wheezing, crepitations, rhonchi, normal symmetric air " entry  HEART: regular rate and rhythm, S1, S2 normal, no murmur, click, rub or gallop.    ABDOMEN: abdomen is soft without tenderness, no masses, no hernias, no organomegaly, no rebound, no guarding. Suprapubic tenderness absent. No CVA tenderness.  SKIN: no rashes, no wounds, no other lesions  PSYCH: Alert, oriented x 3, thought content appropriate, speech normal, pleasant and cooperative, good eye contact, well groomed    Assessment/Plan:  Hannah Montoya is a 61 y.o. female who presents today for :    Hannah was seen today for shortness of breath, medication management and follow-up.    Diagnoses and all orders for this visit:    Chronic bronchitis with acute exacerbation  -     Ambulatory referral to Pulmonology  -     X-Ray Chest PA And Lateral; Future    Seasonal allergic rhinitis, unspecified allergic rhinitis trigger  -     fluticasone (FLONASE) 50 mcg/actuation nasal spray; 1 spray by Each Nare route 2 (two) times daily.      1.  Increase pulmicort to 180mcg 2 puff twice daily  2.  Continue stiolto  3.  cxr to be done today  4.  Keep appointment with pulmonary  5.  Stop vape  6.  Avoid cigarette smoke and patient told to have  smoke outside  7.  flonase for seasonal allergies  8.  Patient to call/email with any concerns      Emanuel Chavez MD

## 2017-03-23 NOTE — MR AVS SNAPSHOT
[facility]  [address]  [place] [address]  Phone: [phone]                  Hannah Montoya   3/23/2017 11:15 AM   Office Visit    Description:  Female : 1955   Provider:  Emanuel Chavez MD   Department:  [facility]           Reason for Visit     Shortness of Breath     Medication Management     Follow-up           Diagnoses this Visit        Comments    Chronic bronchitis with acute exacerbation    -  Primary     Seasonal allergic rhinitis, unspecified allergic rhinitis trigger                To Do List           Goals (5 Years of Data)     None      Follow-Up and Disposition     Return if symptoms worsen or fail to improve.       These Medications        Disp Refills Start End    fluticasone (FLONASE) 50 mcg/actuation nasal spray 1 Bottle 4 3/23/2017     1 spray by Each Nare route 2 (two) times daily. - Each Nare    Pharmacy: [facility] [address] #: [phone]         [facility] On Call     [facility] On Call Nurse Care Line -  Assistance  Registered nurses in the [facility] On Call Center provide clinical advisement, health education, appointment booking, and other advisory services.  Call for this free service at [phone].             Medications           Message regarding Medications     Verify the changes and/or additions to your medication regime listed below are the same as discussed with your clinician today.  If any of these changes or additions are incorrect, please notify your healthcare provider.        START taking these NEW medications        Refills    fluticasone (FLONASE) 50 mcg/actuation nasal spray 4    Si spray by Each Nare route 2 (two) times daily.    Class: Normal    Route: Each Nare      STOP taking these medications     atorvastatin (LIPITOR) 10 MG tablet TAKE ONE TABLET BY MOUTH DAILY    benzonatate (TESSALON) 100 MG capsule     hydrocodone-acetaminophen  "5-325mg (NORCO) 5-325 mg per tablet TK 1 T PO Q 6 H PRN P OR COUGH           Verify that the below list of medications is an accurate representation of the medications you are currently taking.  If none reported, the list may be blank. If incorrect, please contact your healthcare provider. Carry this list with you in case of emergency.           Current Medications     albuterol (ACCUNEB) 1.25 mg/3 mL Nebu Use 1 vial IN NEBULIZER EVERY 6 HOURS AS NEEDED    albuterol (PROAIR HFA) 90 mcg/actuation inhaler Inhale 2 puffs into the lungs every 6 (six) hours as needed. Rescue    albuterol-ipratropium 2.5mg-0.5mg/3mL (DUO-NEB) 0.5 mg-3 mg(2.5 mg base)/3 mL nebulizer solution     alprazolam (XANAX) 0.25 MG tablet TAKE 1 TABLET(0.25 MG) BY MOUTH DAILY AS NEEDED FOR ANXIETY    amlodipine (NORVASC) 5 MG tablet TAKE ONE TABLET BY MOUTH EVERY DAY    budesonide (PULMICORT FLEXHALER) 90 mcg/actuation AePB Inhale 2 puffs (180 mcg total) into the lungs 2 (two) times daily.    lisinopril-hydrochlorothiazide (PRINZIDE,ZESTORETIC) 20-12.5 mg per tablet TAKE TWO TABLETS BY MOUTH EVERY DAY    metformin (GLUCOPHAGE) 500 MG tablet TAKE ONE TABLET BY MOUTH TWICE DAILY    sertraline (ZOLOFT) 100 MG tablet Take 1 tablet (100 mg total) by mouth every evening.    STIOLTO RESPIMAT 2.5-2.5 mcg/actuation Mist INHALE 2 PUFFS BY MOUTH DAILY    TRUETEST TEST STRIPS Strp test once EVERY MORNING    fluticasone (FLONASE) 50 mcg/actuation nasal spray 1 spray by Each Nare route 2 (two) times daily.    NTS STEP 1 21 mg/24 hr PLACE 1 PATCH ONTO THE SKIN ONCE DAILY.    polyethylene glycol (COLYTE) 240-22.72-6.72 -5.84 gram SolR Take 4,000 mLs (4 L total) by mouth as directed.           Clinical Reference Information           Your Vitals Were     BP Pulse Temp Resp Height Weight    124/82 (BP Location: Right arm, Patient Position: Sitting, BP Method: Manual) 112 97.4 °F (36.3 °C) (Oral) 17 5' 2" (1.575 m) 88 kg (194 lb 0.1 oz)    Last Period SpO2 BMI       "    (LMP Unknown) 95% 35.48 kg/m2        Blood Pressure          Most Recent Value    BP  124/82      Allergies as of 3/23/2017     No Known Allergies      Immunizations Administered on Date of Encounter - 3/23/2017     None      Orders Placed During Today's Visit      Normal Orders This Visit    Ambulatory referral to Pulmonology     Future Labs/Procedures Expected by Expires    X-Ray Chest PA And Lateral  3/23/2017 3/23/2018      Language Assistance Services     ATTENTION: Language assistance services are available, free of charge. Please call [phone].      ATENCIÓN: Si habla español, tiene a terry disposición servicios gratuitos de asistencia lingüística. Llame al [phone].     CHÚ Ý: N?u b?n nói Ti?ng Vi?t, có các d?ch v? h? tr? ngôn ng? mi?n phí dành cho b?n. G?i s? [phone].         [facility] complies with applicable Federal civil rights laws and does not discriminate on the basis of race, color, national origin, age, disability, or sex.

## 2017-03-24 ENCOUNTER — LAB VISIT (OUTPATIENT)
Dept: LAB | Facility: HOSPITAL | Age: 62
End: 2017-03-24
Attending: FAMILY MEDICINE
Payer: COMMERCIAL

## 2017-03-24 ENCOUNTER — PATIENT MESSAGE (OUTPATIENT)
Dept: FAMILY MEDICINE | Facility: CLINIC | Age: 62
End: 2017-03-24

## 2017-03-24 DIAGNOSIS — R60.0 BILATERAL EDEMA OF LOWER EXTREMITY: Primary | ICD-10-CM

## 2017-03-24 DIAGNOSIS — R06.00 DYSPNEA, UNSPECIFIED TYPE: ICD-10-CM

## 2017-03-24 DIAGNOSIS — R93.89 ABNORMAL CXR: ICD-10-CM

## 2017-03-24 LAB
ALBUMIN SERPL BCP-MCNC: 3.6 G/DL
ALP SERPL-CCNC: 40 U/L
ALT SERPL W/O P-5'-P-CCNC: 25 U/L
ANION GAP SERPL CALC-SCNC: 7 MMOL/L
AST SERPL-CCNC: 17 U/L
BASOPHILS # BLD AUTO: 0.07 K/UL
BASOPHILS NFR BLD: 0.9 %
BILIRUB SERPL-MCNC: 0.6 MG/DL
BNP SERPL-MCNC: 983 PG/ML
BUN SERPL-MCNC: 13 MG/DL
CALCIUM SERPL-MCNC: 10 MG/DL
CHLORIDE SERPL-SCNC: 104 MMOL/L
CO2 SERPL-SCNC: 29 MMOL/L
CREAT SERPL-MCNC: 0.9 MG/DL
DIFFERENTIAL METHOD: ABNORMAL
EOSINOPHIL # BLD AUTO: 0 K/UL
EOSINOPHIL NFR BLD: 0.4 %
ERYTHROCYTE [DISTWIDTH] IN BLOOD BY AUTOMATED COUNT: 15 %
EST. GFR  (AFRICAN AMERICAN): >60 ML/MIN/1.73 M^2
EST. GFR  (NON AFRICAN AMERICAN): >60 ML/MIN/1.73 M^2
GLUCOSE SERPL-MCNC: 104 MG/DL
HCT VFR BLD AUTO: 37.7 %
HGB BLD-MCNC: 12.1 G/DL
LYMPHOCYTES # BLD AUTO: 1.2 K/UL
LYMPHOCYTES NFR BLD: 15.8 %
MCH RBC QN AUTO: 28.5 PG
MCHC RBC AUTO-ENTMCNC: 32.1 %
MCV RBC AUTO: 89 FL
MONOCYTES # BLD AUTO: 0.7 K/UL
MONOCYTES NFR BLD: 8.8 %
NEUTROPHILS # BLD AUTO: 5.6 K/UL
NEUTROPHILS NFR BLD: 72.9 %
PLATELET # BLD AUTO: 214 K/UL
PMV BLD AUTO: 11.9 FL
POTASSIUM SERPL-SCNC: 3.9 MMOL/L
PROT SERPL-MCNC: 6.8 G/DL
RBC # BLD AUTO: 4.25 M/UL
SODIUM SERPL-SCNC: 140 MMOL/L
WBC # BLD AUTO: 7.64 K/UL

## 2017-03-24 PROCEDURE — 36415 COLL VENOUS BLD VENIPUNCTURE: CPT

## 2017-03-24 PROCEDURE — 85025 COMPLETE CBC W/AUTO DIFF WBC: CPT

## 2017-03-24 PROCEDURE — 80053 COMPREHEN METABOLIC PANEL: CPT

## 2017-03-24 PROCEDURE — 83880 ASSAY OF NATRIURETIC PEPTIDE: CPT

## 2017-03-24 RX ORDER — FUROSEMIDE 20 MG/1
20 TABLET ORAL DAILY
Qty: 5 TABLET | Refills: 0 | Status: SHIPPED | OUTPATIENT
Start: 2017-03-24 | End: 2017-03-29

## 2017-03-24 RX ORDER — POTASSIUM CHLORIDE 750 MG/1
10 TABLET, EXTENDED RELEASE ORAL DAILY
Qty: 5 TABLET | Refills: 0 | Status: SHIPPED | OUTPATIENT
Start: 2017-03-24 | End: 2017-03-29

## 2017-03-26 ENCOUNTER — PATIENT MESSAGE (OUTPATIENT)
Dept: FAMILY MEDICINE | Facility: CLINIC | Age: 62
End: 2017-03-26

## 2017-03-29 ENCOUNTER — PATIENT MESSAGE (OUTPATIENT)
Dept: FAMILY MEDICINE | Facility: CLINIC | Age: 62
End: 2017-03-29

## 2017-03-29 ENCOUNTER — OFFICE VISIT (OUTPATIENT)
Dept: CARDIOLOGY | Facility: CLINIC | Age: 62
End: 2017-03-29
Payer: COMMERCIAL

## 2017-03-29 ENCOUNTER — HOSPITAL ENCOUNTER (OUTPATIENT)
Dept: CARDIOLOGY | Facility: HOSPITAL | Age: 62
Discharge: HOME | End: 2017-03-29
Attending: FAMILY MEDICINE
Payer: COMMERCIAL

## 2017-03-29 VITALS
OXYGEN SATURATION: 96 % | SYSTOLIC BLOOD PRESSURE: 111 MMHG | DIASTOLIC BLOOD PRESSURE: 67 MMHG | HEART RATE: 114 BPM | WEIGHT: 188.81 LBS | HEIGHT: 62 IN | BODY MASS INDEX: 34.74 KG/M2

## 2017-03-29 DIAGNOSIS — E78.5 DYSLIPIDEMIA: ICD-10-CM

## 2017-03-29 DIAGNOSIS — R60.0 BILATERAL EDEMA OF LOWER EXTREMITY: ICD-10-CM

## 2017-03-29 DIAGNOSIS — I44.7 LBBB (LEFT BUNDLE BRANCH BLOCK): ICD-10-CM

## 2017-03-29 DIAGNOSIS — R06.00 DYSPNEA, UNSPECIFIED TYPE: ICD-10-CM

## 2017-03-29 DIAGNOSIS — I10 ESSENTIAL HYPERTENSION: ICD-10-CM

## 2017-03-29 DIAGNOSIS — I50.22 CHRONIC SYSTOLIC CONGESTIVE HEART FAILURE: Primary | ICD-10-CM

## 2017-03-29 DIAGNOSIS — Z72.0 TOBACCO ABUSE: ICD-10-CM

## 2017-03-29 DIAGNOSIS — Z82.49 FAMILY HISTORY OF CORONARY ARTERY DISEASE: ICD-10-CM

## 2017-03-29 DIAGNOSIS — R93.89 ABNORMAL CXR: ICD-10-CM

## 2017-03-29 DIAGNOSIS — E11.21 CONTROLLED TYPE 2 DIABETES MELLITUS WITH DIABETIC NEPHROPATHY, WITHOUT LONG-TERM CURRENT USE OF INSULIN: ICD-10-CM

## 2017-03-29 PROCEDURE — 93306 TTE W/DOPPLER COMPLETE: CPT | Mod: 26,,, | Performed by: INTERNAL MEDICINE

## 2017-03-29 PROCEDURE — 99999 PR PBB SHADOW E&M-EST. PATIENT-LVL III: CPT | Mod: PBBFAC,,, | Performed by: INTERNAL MEDICINE

## 2017-03-29 PROCEDURE — 3044F HG A1C LEVEL LT 7.0%: CPT | Mod: S$GLB,,, | Performed by: INTERNAL MEDICINE

## 2017-03-29 PROCEDURE — 99204 OFFICE O/P NEW MOD 45 MIN: CPT | Mod: S$GLB,,, | Performed by: INTERNAL MEDICINE

## 2017-03-29 PROCEDURE — 93306 TTE W/DOPPLER COMPLETE: CPT

## 2017-03-29 PROCEDURE — 1160F RVW MEDS BY RX/DR IN RCRD: CPT | Mod: S$GLB,,, | Performed by: INTERNAL MEDICINE

## 2017-03-29 PROCEDURE — 3074F SYST BP LT 130 MM HG: CPT | Mod: S$GLB,,, | Performed by: INTERNAL MEDICINE

## 2017-03-29 PROCEDURE — 4010F ACE/ARB THERAPY RXD/TAKEN: CPT | Mod: S$GLB,,, | Performed by: INTERNAL MEDICINE

## 2017-03-29 PROCEDURE — 2022F DILAT RTA XM EVC RTNOPTHY: CPT | Mod: S$GLB,,, | Performed by: INTERNAL MEDICINE

## 2017-03-29 PROCEDURE — 3078F DIAST BP <80 MM HG: CPT | Mod: S$GLB,,, | Performed by: INTERNAL MEDICINE

## 2017-03-29 RX ORDER — CARVEDILOL 6.25 MG/1
25 TABLET ORAL 2 TIMES DAILY WITH MEALS
Qty: 60 TABLET | Refills: 3 | Status: SHIPPED | OUTPATIENT
Start: 2017-03-29 | End: 2017-04-21 | Stop reason: SDUPTHER

## 2017-03-29 RX ORDER — FUROSEMIDE 20 MG/1
20 TABLET ORAL DAILY
Qty: 5 TABLET | Refills: 0 | Status: SHIPPED | OUTPATIENT
Start: 2017-03-29 | End: 2017-04-04

## 2017-03-29 NOTE — MR AVS SNAPSHOT
[facility]  [address] [place] [address]  Phone: [phone]                  Hannah Montoya   3/29/2017 3:00 PM   Office Visit    Description:  Female : 1955   Provider:  Kevin Lopez MD   Department:  [facility]           Reason for Visit     Establish Care                To Do List           Future Appointments        Provider Department Dept Phone    3/29/2017 3:00 PM Kevin Lopez MD [facility] [phone]      Goals (5 Years of Data)     None      [facility] On Call     [facility] On Call Nurse Care Line -  Assistance  Registered nurses in the [facility] On Call Center provide clinical advisement, health education, appointment booking, and other advisory services.  Call for this free service at [phone].             Medications           Message regarding Medications     Verify the changes and/or additions to your medication regime listed below are the same as discussed with your clinician today.  If any of these changes or additions are incorrect, please notify your healthcare provider.        STOP taking these medications     furosemide (LASIX) 20 MG tablet Take 1 tablet (20 mg total) by mouth once daily.    polyethylene glycol (COLYTE) 240-22.72-6.72 -5.84 gram SolR Take 4,000 mLs (4 L total) by mouth as directed.    NTS STEP 1 21 mg/24 hr PLACE 1 PATCH ONTO THE SKIN ONCE DAILY.           Verify that the below list of medications is an accurate representation of the medications you are currently taking.  If none reported, the list may be blank. If incorrect, please contact your healthcare provider. Carry this list with you in case of emergency.           Current Medications     albuterol (ACCUNEB) 1.25 mg/3 mL Nebu Use 1 vial IN NEBULIZER EVERY 6 HOURS AS NEEDED    albuterol (PROAIR HFA) 90 mcg/actuation inhaler Inhale 2 puffs into the lungs every 6 (six) hours as needed. Rescue    albuterol-ipratropium 2.5mg-0.5mg/3mL  "(DUO-NEB) 0.5 mg-3 mg(2.5 mg base)/3 mL nebulizer solution     alprazolam (XANAX) 0.25 MG tablet TAKE 1 TABLET(0.25 MG) BY MOUTH DAILY AS NEEDED FOR ANXIETY    amlodipine (NORVASC) 5 MG tablet TAKE ONE TABLET BY MOUTH EVERY DAY    budesonide (PULMICORT FLEXHALER) 90 mcg/actuation AePB Inhale 2 puffs (180 mcg total) into the lungs 2 (two) times daily.    fluticasone (FLONASE) 50 mcg/actuation nasal spray 1 spray by Each Nare route 2 (two) times daily.    lisinopril-hydrochlorothiazide (PRINZIDE,ZESTORETIC) 20-12.5 mg per tablet TAKE TWO TABLETS BY MOUTH EVERY DAY    metformin (GLUCOPHAGE) 500 MG tablet TAKE ONE TABLET BY MOUTH TWICE DAILY    potassium chloride SA (K-DUR,KLOR-CON) 10 MEQ tablet Take 1 tablet (10 mEq total) by mouth once daily.    sertraline (ZOLOFT) 100 MG tablet Take 1 tablet (100 mg total) by mouth every evening.    STIOLTO RESPIMAT 2.5-2.5 mcg/actuation Mist INHALE 2 PUFFS BY MOUTH DAILY    TRUETEST TEST STRIPS Strp test once EVERY MORNING           Clinical Reference Information           Your Vitals Were     BP Pulse Height Weight Last Period SpO2    111/67 (BP Location: Left arm, Patient Position: Sitting, BP Method: Automatic) 114 5' 2" (1.575 m) 85.6 kg (188 lb 12.8 oz) (LMP Unknown) 96%    BMI                34.53 kg/m2          Blood Pressure          Most Recent Value    BP  111/67      Allergies as of 3/29/2017     No Known Allergies      Immunizations Administered on Date of Encounter - 3/29/2017     None      Language Assistance Services     ATTENTION: Language assistance services are available, free of charge. Please call [phone].      ATENCIÓN: Si otf lozada, tiene a terry disposición servicios gratuitos de asistencia lingüística. Llame al [phone].     CHÚ Ý: N?u b?n nói Ti?ng Vi?t, có các d?ch v? h? tr? ngôn ng? mi?n phí dành cho b?n. G?i s? [phone].         [facility] complies with applicable Federal civil rights laws and does not discriminate on the " basis of race, color, national origin, age, disability, or sex.

## 2017-03-29 NOTE — PROGRESS NOTES
Subjective:    Patient ID:  Hannah Montoya is a 61 y.o. female who presents for evaluation of Establish Care      HPI  Patient is here for cardiac evaluation for newly diagnosed cardiomyopathy.  She was in the echocardiogram suite and was found to have a newly diagnosed severe cardiomyopathy with ejection fraction approximately 20%.  This was new for her.  We recalled to the lab and discussed expediting her workup due to the severity of her cardiomyopathy.  She says she has been having significant substernal chest pressure and shortness of breath mainly on exercise but relieved with rest.  She's able to walk may be 2 blocks without problems.  She does have some mild PND, orthopnea and no current lower extremity edema.  She denies any dizziness to the point of presyncope or syncope.  She says she really began expressing these symptoms 2 weeks ago where she noticed a distinct change.    Review of Systems   Constitution: Negative.   HENT: Negative.    Eyes: Negative.    Cardiovascular: Positive for chest pain, dyspnea on exertion, orthopnea and paroxysmal nocturnal dyspnea. Negative for irregular heartbeat, leg swelling, near-syncope, palpitations and syncope.   Respiratory: Positive for shortness of breath.    Skin: Negative.    Musculoskeletal: Negative.    Gastrointestinal: Negative for abdominal pain, constipation and diarrhea.   Genitourinary: Negative for dysuria.   Neurological: Negative.    Psychiatric/Behavioral: Negative.      Past Medical History:   Diagnosis Date    Breast cancer     COPD (chronic obstructive pulmonary disease)     Diabetes mellitus, type 2     Hyperglycemia     Hyperlipidemia      Past Surgical History:   Procedure Laterality Date    [facility]OPSY Right 2016    [facility]    BREAST SURGERY       SECTION      x2    CHOLECYSTECTOMY      MASTECTOMY W/ SENTINEL NODE BIOPSY      TUBAL LIGATION       Social History   Substance Use Topics    Smoking status: Former Smoker      Packs/day: 0.50     Years: 40.00     Quit date: 8/1/2016    Smokeless tobacco: None    Alcohol use No      Comment: rare- holiday     Family History   Problem Relation Age of Onset    Kidney disease Mother     Kidney disease Father     Clotting disorder Brother     Breast cancer Neg Hx     Ovarian cancer Neg Hx         Objective:    Physical Exam   Constitutional: She is oriented to person, place, and time. She appears well-developed and well-nourished.   HENT:   Head: Normocephalic and atraumatic.   Eyes: Conjunctivae and EOM are normal. Pupils are equal, round, and reactive to light.   Neck: Normal range of motion. Neck supple. No thyromegaly present.   Cardiovascular: Normal rate and regular rhythm.    No murmur heard.  Pulmonary/Chest: Effort normal and breath sounds normal. No respiratory distress.   Abdominal: Soft. Bowel sounds are normal.   Musculoskeletal: She exhibits no edema.   Neurological: She is alert and oriented to person, place, and time.   Skin: Skin is warm and dry.   Psychiatric: She has a normal mood and affect. Her behavior is normal.       ekg sinus tachycardic rhythm with left bundle-branch block    Assessment:       1. Chronic systolic congestive heart failure    2. Dyspnea, unspecified type    3. Essential hypertension    4. Dyslipidemia    5. Controlled type 2 diabetes mellitus with diabetic nephropathy, without long-term current use of insulin    6. Tobacco abuse    7. Family history of coronary artery disease    8. LBBB (left bundle branch block)    9. Bilateral edema of lower extremity         Plan:       -Plan for baseline ischemic workup  -Add carvedilol and low-dose diuretic with Lasix 20 mg daily  -Go to the emergency department for any significant worsening of symptoms    Return to clinic in one week with testing ASAP

## 2017-03-30 ENCOUNTER — HOSPITAL ENCOUNTER (OUTPATIENT)
Dept: RADIOLOGY | Facility: HOSPITAL | Age: 62
Discharge: HOME | End: 2017-03-30
Attending: INTERNAL MEDICINE
Payer: COMMERCIAL

## 2017-03-30 ENCOUNTER — HOSPITAL ENCOUNTER (OUTPATIENT)
Dept: CARDIOLOGY | Facility: HOSPITAL | Age: 62
Discharge: HOME | End: 2017-03-30
Attending: INTERNAL MEDICINE
Payer: COMMERCIAL

## 2017-03-30 ENCOUNTER — TELEPHONE (OUTPATIENT)
Dept: CARDIOLOGY | Facility: CLINIC | Age: 62
End: 2017-03-30

## 2017-03-30 DIAGNOSIS — I50.22 CHRONIC SYSTOLIC CONGESTIVE HEART FAILURE: ICD-10-CM

## 2017-03-30 DIAGNOSIS — I50.31 ACUTE DIASTOLIC CONGESTIVE HEART FAILURE: ICD-10-CM

## 2017-03-30 LAB
DIASTOLIC DYSFUNCTION: NO
DIASTOLIC DYSFUNCTION: YES
ESTIMATED PA SYSTOLIC PRESSURE: 47.19
GLOBAL PERICARDIAL EFFUSION: ABNORMAL
MITRAL VALVE REGURGITATION: ABNORMAL
RETIRED EF AND QEF - SEE NOTES: 20 (ref 55–65)
TRICUSPID VALVE REGURGITATION: ABNORMAL

## 2017-03-30 PROCEDURE — 93016 CV STRESS TEST SUPVJ ONLY: CPT | Mod: ,,, | Performed by: INTERNAL MEDICINE

## 2017-03-30 PROCEDURE — 63600175 PHARM REV CODE 636 W HCPCS

## 2017-03-30 PROCEDURE — 93018 CV STRESS TEST I&R ONLY: CPT | Mod: ,,, | Performed by: INTERNAL MEDICINE

## 2017-03-30 PROCEDURE — 78452 HT MUSCLE IMAGE SPECT MULT: CPT | Mod: 26,,, | Performed by: INTERNAL MEDICINE

## 2017-03-30 PROCEDURE — 78452 HT MUSCLE IMAGE SPECT MULT: CPT | Mod: TC

## 2017-03-30 PROCEDURE — 93017 CV STRESS TEST TRACING ONLY: CPT

## 2017-03-30 RX ORDER — AMLODIPINE BESYLATE 5 MG/1
5 TABLET ORAL DAILY
Qty: 30 TABLET | Refills: 2 | Status: SHIPPED | OUTPATIENT
Start: 2017-03-30 | End: 2017-04-03

## 2017-03-30 RX ORDER — REGADENOSON 0.08 MG/ML
INJECTION, SOLUTION INTRAVENOUS
Status: DISPENSED
Start: 2017-03-30 | End: 2017-03-30

## 2017-03-30 RX ORDER — METFORMIN HYDROCHLORIDE 500 MG/1
500 TABLET ORAL 2 TIMES DAILY
Qty: 60 TABLET | Refills: 2 | Status: SHIPPED | OUTPATIENT
Start: 2017-03-30 | End: 2017-08-11 | Stop reason: SDUPTHER

## 2017-03-30 RX ORDER — LISINOPRIL AND HYDROCHLOROTHIAZIDE 12.5; 2 MG/1; MG/1
2 TABLET ORAL DAILY
Qty: 60 TABLET | Refills: 2 | Status: SHIPPED | OUTPATIENT
Start: 2017-03-30 | End: 2017-04-27 | Stop reason: ALTCHOICE

## 2017-03-30 NOTE — TELEPHONE ENCOUNTER
----- Message from Breann Avina sent at 3/29/2017  4:27 PM CDT -----  Contact: [phone] Elizabeth   Pt daughter wants to know if the pt can take a shower pt was put on bedrest Please call  at your earliest convenience.  Thanks!

## 2017-03-30 NOTE — TELEPHONE ENCOUNTER
I talk to the family member this morning and states she very light headed after taking meds also and should she take a bath if she on bed rest for now.

## 2017-03-31 ENCOUNTER — NURSE TRIAGE (OUTPATIENT)
Dept: ADMINISTRATIVE | Facility: CLINIC | Age: 62
End: 2017-03-31

## 2017-03-31 DIAGNOSIS — J44.1 COPD WITH ACUTE EXACERBATION: ICD-10-CM

## 2017-03-31 RX ORDER — TIOTROPIUM BROMIDE AND OLODATEROL 3.124; 2.736 UG/1; UG/1
SPRAY, METERED RESPIRATORY (INHALATION)
Qty: 4 G | Refills: 0 | Status: SHIPPED | OUTPATIENT
Start: 2017-03-31 | End: 2017-08-09

## 2017-03-31 NOTE — TELEPHONE ENCOUNTER
Reason for Disposition   MODERATE weakness (i.e., interferes with work, school, normal activities) and cause unknown    Protocols used: ST WEAKNESS (GENERALIZED) AND FATIGUE-A-OH     is calling concerned about Hannah.  States she is very tired.  Sleeping a lot. Does not want to talk.  Almost like she is depressed.  States she is not herself at all.   is concerned about new dosage of Coreg and wondering if this is the cause.  Would like a call back from Dr. Lopez to discuss.  Please contact  at [phone].

## 2017-04-01 ENCOUNTER — HOSPITAL ENCOUNTER (EMERGENCY)
Facility: HOSPITAL | Age: 62
Discharge: HOME | End: 2017-04-01
Attending: EMERGENCY MEDICINE
Payer: COMMERCIAL

## 2017-04-01 VITALS
OXYGEN SATURATION: 95 % | DIASTOLIC BLOOD PRESSURE: 65 MMHG | HEART RATE: 80 BPM | BODY MASS INDEX: 34.6 KG/M2 | WEIGHT: 188 LBS | RESPIRATION RATE: 18 BRPM | TEMPERATURE: 98 F | HEIGHT: 62 IN | SYSTOLIC BLOOD PRESSURE: 109 MMHG

## 2017-04-01 DIAGNOSIS — I95.2 HYPOTENSION DUE TO DRUGS: ICD-10-CM

## 2017-04-01 DIAGNOSIS — T50.905A MEDICATION REACTION, INITIAL ENCOUNTER: ICD-10-CM

## 2017-04-01 DIAGNOSIS — R79.89 ELEVATED TROPONIN: ICD-10-CM

## 2017-04-01 DIAGNOSIS — E86.0 DEHYDRATION: Primary | ICD-10-CM

## 2017-04-01 LAB
ALBUMIN SERPL BCP-MCNC: 3.4 G/DL
ALP SERPL-CCNC: 36 U/L
ALT SERPL W/O P-5'-P-CCNC: 21 U/L
ANION GAP SERPL CALC-SCNC: 14 MMOL/L
AST SERPL-CCNC: 16 U/L
BASOPHILS # BLD AUTO: 0.04 K/UL
BASOPHILS NFR BLD: 0.5 %
BILIRUB SERPL-MCNC: 0.4 MG/DL
BUN SERPL-MCNC: 23 MG/DL
CALCIUM SERPL-MCNC: 9.3 MG/DL
CHLORIDE SERPL-SCNC: 104 MMOL/L
CO2 SERPL-SCNC: 22 MMOL/L
CREAT SERPL-MCNC: 1.1 MG/DL
DIFFERENTIAL METHOD: ABNORMAL
EOSINOPHIL # BLD AUTO: 0 K/UL
EOSINOPHIL NFR BLD: 0.4 %
ERYTHROCYTE [DISTWIDTH] IN BLOOD BY AUTOMATED COUNT: 14.8 %
EST. GFR  (AFRICAN AMERICAN): >60 ML/MIN/1.73 M^2
EST. GFR  (NON AFRICAN AMERICAN): 55 ML/MIN/1.73 M^2
GLUCOSE SERPL-MCNC: 139 MG/DL
HCT VFR BLD AUTO: 35.7 %
HGB BLD-MCNC: 11.4 G/DL
LYMPHOCYTES # BLD AUTO: 1 K/UL
LYMPHOCYTES NFR BLD: 11.9 %
MCH RBC QN AUTO: 28.7 PG
MCHC RBC AUTO-ENTMCNC: 31.9 %
MCV RBC AUTO: 90 FL
MONOCYTES # BLD AUTO: 0.6 K/UL
MONOCYTES NFR BLD: 7.9 %
NEUTROPHILS # BLD AUTO: 6.2 K/UL
NEUTROPHILS NFR BLD: 78 %
PLATELET # BLD AUTO: 221 K/UL
PMV BLD AUTO: 12.7 FL
POTASSIUM SERPL-SCNC: 3.4 MMOL/L
PROT SERPL-MCNC: 6.3 G/DL
RBC # BLD AUTO: 3.97 M/UL
SODIUM SERPL-SCNC: 140 MMOL/L
TROPONIN I SERPL DL<=0.01 NG/ML-MCNC: 0.06 NG/ML
WBC # BLD AUTO: 7.99 K/UL

## 2017-04-01 PROCEDURE — 80053 COMPREHEN METABOLIC PANEL: CPT

## 2017-04-01 PROCEDURE — 85025 COMPLETE CBC W/AUTO DIFF WBC: CPT

## 2017-04-01 PROCEDURE — 93005 ELECTROCARDIOGRAM TRACING: CPT

## 2017-04-01 PROCEDURE — 84484 ASSAY OF TROPONIN QUANT: CPT

## 2017-04-01 PROCEDURE — 99284 EMERGENCY DEPT VISIT MOD MDM: CPT

## 2017-04-01 NOTE — ED AVS SNAPSHOT
[facility]  [place] [address]               Hannah Montoya   2017  1:59 PM   ED    Description:  Female : 1955   Department:  [facility]           Your Care was Coordinated By:     Provider Role From To    Bob Collier MD Attending Provider 17 1406 --      Reason for Visit     Weakness           Diagnoses this Visit        Comments    Dehydration    -  Primary     Elevated troponin         Medication reaction, initial encounter         Hypotension due to drugs           ED Disposition     ED Disposition Condition Comment    Discharge             To Do List           Follow-up Information     Follow up with Kevin Lopez MD On 4/3/2017.    Specialty:  Cardiology    Why:  As scheduled    Contact information:    [address]  [place] [address]  [phone]          Follow up with [facility].    Specialty:  Emergency Medicine    Why:  As needed, If symptoms worsen    Contact information:    [address] [place] [address] [phone]      [facility]     [facility] On Call Nurse Care Line -  Assistance  Unless otherwise directed by your provider, please contact [facility], our nurse care line that is available for  assistance.     Registered nurses in the [facility] provide: appointment scheduling, clinical advisement, health education, and other advisory services.  Call: [phone] (toll free)               Medications           Message regarding Medications     Verify the changes and/or additions to your medication regime listed below are the same as discussed with your clinician today.  If any of these changes or additions are incorrect, please notify your healthcare provider.             Verify that the below list of medications is an accurate representation of the medications you are currently taking.  If none reported,  "the list may be blank. If incorrect, please contact your healthcare provider. Carry this list with you in case of emergency.           Current Medications     albuterol (ACCUNEB) 1.25 mg/3 mL Nebu Use 1 vial IN NEBULIZER EVERY 6 HOURS AS NEEDED    albuterol (PROAIR HFA) 90 mcg/actuation inhaler Inhale 2 puffs into the lungs every 6 (six) hours as needed. Rescue    albuterol-ipratropium 2.5mg-0.5mg/3mL (DUO-NEB) 0.5 mg-3 mg(2.5 mg base)/3 mL nebulizer solution     alprazolam (XANAX) 0.25 MG tablet TAKE 1 TABLET(0.25 MG) BY MOUTH DAILY AS NEEDED FOR ANXIETY    amlodipine (NORVASC) 5 MG tablet Take 1 tablet (5 mg total) by mouth once daily.    budesonide (PULMICORT FLEXHALER) 90 mcg/actuation AePB Inhale 2 puffs (180 mcg total) into the lungs 2 (two) times daily.    carvedilol (COREG) 6.25 MG tablet Take 4 tablets (25 mg total) by mouth 2 (two) times daily with meals.    fluticasone (FLONASE) 50 mcg/actuation nasal spray 1 spray by Each Nare route 2 (two) times daily.    furosemide (LASIX) 20 MG tablet Take 1 tablet (20 mg total) by mouth once daily.    lisinopril-hydrochlorothiazide (PRINZIDE,ZESTORETIC) 20-12.5 mg per tablet Take 2 tablets by mouth once daily.    metformin (GLUCOPHAGE) 500 MG tablet Take 1 tablet (500 mg total) by mouth 2 (two) times daily.    sertraline (ZOLOFT) 100 MG tablet Take 1 tablet (100 mg total) by mouth every evening.    STIOLTO RESPIMAT 2.5-2.5 mcg/actuation Mist INHALE 2 PUFFS BY MOUTH DAILY    TRUETEST TEST STRIPS Strp test once EVERY MORNING           Clinical Reference Information           Your Vitals Were     BP Pulse Temp Resp Height Weight    106/61 78 98 °F (36.7 °C) (Oral) 18 5' 2" (1.575 m) 85.3 kg (188 lb)    Last Period SpO2 BMI          (LMP Unknown) 94% 34.39 kg/m2        Allergies as of 4/1/2017     No Known Allergies      Immunizations Administered on Date of Encounter - 4/1/2017     None      ED Micro, Lab, POCT     Start Ordered       Status Ordering Provider    " "04/01/17 1407 04/01/17 1406  CBC auto differential  Once      Final result     04/01/17 1407 04/01/17 1406  Comprehensive metabolic panel  Once      Final result     04/01/17 1407 04/01/17 1406  Troponin I  STAT      Final result       ED Imaging Orders     None        Discharge Instructions       CLOSELY MONITOR YOUR BLOOD PRESSURE. REDUCE THE COREG TO 1 DOSE IN THE MORNING WITH MEALS.  RECORD DAILY WEIGHTS AND WATCH FOR EXCESS FLUID RETENTION.  FOLLOW UP WITH YOUR CARDIOLOGIST AS SCHEDULED.     Return to the Emergency Department of any acute worsening of your symptoms or for any other concern.     You should return to the ED for fever/chills, shortness of breath, chest pain, weakness or "passing out".     Pt should take all medications as prescribed.    Pt should follow up with PCP as soon as possible.    The risks associated with not taking your medications as prescribed and not following up with your Primary Care doctor or sub specialist includes worsening of your condition, pain, disability, loss of function or livelihood, and death      RADHA Collier M.D. 4:07 PM 4/1/2017      Our goal in the emergency department is to always give you outstanding care and exceptional service. You may receive a survey by mail or e-mail in the next week regarding your experience in our ED. We would greatly appreciate your completing and returning the survey. Your feedback provides us with a way to recognize our staff who give very good care and it helps us learn how to improve when your experience was below our aspiration of excellence.       Discharge References/Attachments     HYPOTENSION, ALL CAUSES (ENGLISH)    LOW BLOOD PRESSURE (HYPOTENSION), DISCHARGE INSTRUCTIONS (ENGLISH)    BETA-BLOCKER, TAKING A (ENGLISH)      Your Scheduled Appointments     Apr 03, 2017  8:20 AM CDT   Established Patient Visit with Kevin Lopez MD   [facility] - Cardiology ([facility])    [address] [facility] [address] [place] " [address] [phone]              Smoking Cessation     If you would like to quit smoking:   You may be eligible for free services if you are a [place] resident and started smoking cigarettes before September 1, 1988.  Call the Smoking Cessation Trust (SCT) toll free at (707) 672-6320 or (395) 555-7915.   Call 1-800-QUIT-NOW if you do not meet the above criteria.   Contact us via email: [email or web address]   View our website for more information: [email or web address]         [facility] complies with applicable Federal civil rights laws and does not discriminate on the basis of race, color, national origin, age, disability, or sex.        Language Assistance Services     ATTENTION: Language assistance services are available, free of charge. Please call [phone].      ATENCIÓN: Si habla español, tiene a terry disposición servicios gratuitos de asistencia lingüística. Llame al [phone].     CHÚ Ý: N?u b?n nói Ti?ng Vi?t, có các d?ch v? h? tr? ngôn ng? mi?n phí dành cho b?n. G?i s? [phone].

## 2017-04-01 NOTE — DISCHARGE INSTRUCTIONS
"CLOSELY MONITOR YOUR BLOOD PRESSURE. REDUCE THE COREG TO 1 DOSE IN THE MORNING WITH MEALS.  RECORD DAILY WEIGHTS AND WATCH FOR EXCESS FLUID RETENTION.  FOLLOW UP WITH YOUR CARDIOLOGIST AS SCHEDULED.     Return to the Emergency Department of any acute worsening of your symptoms or for any other concern.     You should return to the ED for fever/chills, shortness of breath, chest pain, weakness or "passing out".     Pt should take all medications as prescribed.    Pt should follow up with PCP as soon as possible.    The risks associated with not taking your medications as prescribed and not following up with your Primary Care doctor or sub specialist includes worsening of your condition, pain, disability, loss of function or livelihood, and death      RADHA Collier M.D. 4:07 PM 4/1/2017      Our goal in the emergency department is to always give you outstanding care and exceptional service. You may receive a survey by mail or e-mail in the next week regarding your experience in our ED. We would greatly appreciate your completing and returning the survey. Your feedback provides us with a way to recognize our staff who give very good care and it helps us learn how to improve when your experience was below our aspiration of excellence.     "

## 2017-04-01 NOTE — ED PROVIDER NOTES
"Encounter Date: 2017    SCRIBE #1 NOTE: I, Dicktremaine Ferreira, am scribing for, and in the presence of,  Bob Collier MD. I have scribed the following portions of the note - Other sections scribed: HPI, ROS.       History     Chief Complaint   Patient presents with    Weakness     feeling weak after taking beta blocker     Review of patient's allergies indicates:  No Known Allergies  HPI Comments: CC: Weakness    HPI: This 61 year old female has a past medical history of breast cancer, COPD, DM, hyperglycemia, and hyperlipidemia presents to the ED via EMS for evaluation of a two day history of weakness with associated fatigue. Per pt's daughter, pt recently had an irregular echo. Pt's cardiologist recently place pt on 6.25 mg of Coreg 8 times a day. Pt took 4 Coreg along with her normal dosage of Lisinopril and felt weak afterwards. Pt's cardiologists then decreased her dosage of Coreg to 4 times daily. Pt's daughter reports pt was feeling gassy and states she was acting "like she was drunk." Pt denies chest pain and shortness of breath. Pt has an appointment with her cardiologist on 4/3/17.  No other symptoms reported. Symptoms are acute.                   The history is provided by the patient. No  was used.     Past Medical History:   Diagnosis Date    Breast cancer     COPD (chronic obstructive pulmonary disease)     Diabetes mellitus, type 2     Hyperglycemia     Hyperlipidemia      Past Surgical History:   Procedure Laterality Date    BREAST BIOPSY Right 2016    [facility]    BREAST SURGERY       SECTION      x2    CHOLECYSTECTOMY      MASTECTOMY W/ SENTINEL NODE BIOPSY      TUBAL LIGATION       Family History   Problem Relation Age of Onset    Kidney disease Mother     Kidney disease Father     Clotting disorder Brother     Breast cancer Neg Hx     Ovarian cancer Neg Hx      Social History   Substance Use Topics    Smoking status: Former Smoker     Packs/day: " 0.50     Years: 40.00     Quit date: 8/1/2016    Smokeless tobacco: None    Alcohol use No      Comment: rare- holiday     Review of Systems   Constitutional: Positive for fatigue. Negative for fever.   HENT: Negative for sore throat.    Respiratory: Negative for cough, choking, chest tightness, shortness of breath and stridor.    Cardiovascular: Negative for chest pain.   Gastrointestinal: Negative for nausea.   Genitourinary: Negative for dysuria.   Musculoskeletal: Negative for back pain.   Skin: Negative for rash.   Neurological: Positive for weakness.   Hematological: Does not bruise/bleed easily.       Physical Exam   Initial Vitals   BP Pulse Resp Temp SpO2   04/01/17 1400 04/01/17 1400 04/01/17 1400 04/01/17 1400 04/01/17 1400   99/59 82 18 98 °F (36.7 °C) 96 %     Physical Exam    Vitals reviewed.  Constitutional: She appears well-developed and well-nourished.   HENT:   Head: Normocephalic and atraumatic.   Nose: Nose normal.   Mouth/Throat: No oropharyngeal exudate.   Eyes: EOM are normal. Pupils are equal, round, and reactive to light.   Neck: Normal range of motion. Neck supple. No JVD present.   Cardiovascular: Regular rhythm and normal heart sounds. Exam reveals no gallop and no friction rub.    No murmur heard.  Pulmonary/Chest: Breath sounds normal. No stridor. No respiratory distress. She has no wheezes. She has no rhonchi. She has no rales. She exhibits no tenderness.   Abdominal: Soft. Bowel sounds are normal. She exhibits no distension. There is no tenderness. There is no rebound and no guarding.   Musculoskeletal: Normal range of motion. She exhibits no edema or tenderness.   Mild swelling to the bilateral ankles, otherwise no pretibial swelling or edema or leg swelling.   Neurological: She is alert and oriented to person, place, and time. She has normal strength. No cranial nerve deficit or sensory deficit. GCS eye subscore is 4. GCS verbal subscore is 5. GCS motor subscore is 6.   No focal  deficits.   Skin: Skin is warm and dry.   Psychiatric: She has a normal mood and affect. Thought content normal.         ED Course   Procedures  Labs Reviewed   CBC W/ AUTO DIFFERENTIAL - Abnormal; Notable for the following:        Result Value    RBC 3.97 (*)     Hemoglobin 11.4 (*)     Hematocrit 35.7 (*)     MCHC 31.9 (*)     RDW 14.8 (*)     Gran% 78.0 (*)     Lymph% 11.9 (*)     All other components within normal limits   COMPREHENSIVE METABOLIC PANEL - Abnormal; Notable for the following:     Potassium 3.4 (*)     CO2 22 (*)     Glucose 139 (*)     BUN, Bld 23 (*)     Albumin 3.4 (*)     Alkaline Phosphatase 36 (*)     eGFR if non  55 (*)     All other components within normal limits   TROPONIN I - Abnormal; Notable for the following:     Troponin I 0.064 (*)     All other components within normal limits     EKG Readings: (Independently Interpreted)   Initial Reading: No STEMI. Rhythm: Normal Sinus Rhythm. Ectopy: No Ectopy. Conduction: Normal. ST Segments: Normal ST Segments. T Waves: Normal. Clinical Impression: Normal Sinus Rhythm     Medical decision-making:    The patient received a medical screening exam. If performed, the EKG was independently evaluated by me and is pending final cardiology evaluation.  If performed, all radiographic studies were independently evaluated by me and are pending final radiology evaluation. If labs were ordered, they were reviewed. Vital signs are independently assessed by me.  If performed, the pulse oximetry was independently evaluated by me.  I decided to obtain the patient's past medical record.  If available, I reviewed the patient's past medical record, including most recent labs and radiology reports.    Patient presents to the emergency department for evaluation of fatigue and sluggishness after starting a new blood pressure medication.  Patient was recently started on Coreg by her cardiologist after an abnormal stress test.  Review the past medical  chart shows that the patient has an ejection fraction around 50% and signs of cardiac injury.  The patient denies any chest pain today or over the past several days.  She denies any acute shortness of breath.  She states her symptoms started acutely after starting the blood pressure medications.  She contacted her cardiologist, and he recommended reducing the amount that she was taking, but she has had persistence of symptoms.  The patient arrived with a normal neurological exam.  She seemed fatigued but otherwise there is no focal findings.  The patient was mildly hypotensive and she was administered 500 cc of normal saline by EMS prior to arrival.  The patient does not otherwise appear volume overloaded other than some mild ankle swelling.  But she has no pretibial edema or swelling.  She has clear breath sounds and no pulmonary complaints and no JVD.  I do not think she has acute decompensated heart failure.  I recommended that she start taking daily weights and to keep a weight log and to look out for any fluid retention and unusual weight gain.  The patient is not anemic.  I feel that her symptoms are most likely due to the side effect of the beta blocker.  We will have her continue to back down and away from the frequency of her beta blocker usage that she will continue to take it in the mornings and to monitor her blood pressures throughout the day.  Patient was instructed to return to the emergency department for any acute chest pain or any acute blood pressure, especially a systolic blood pressure less than 90.  Patient does have an elevated troponin today, but it is at baseline and not unexpected given her recent stress test.  I do not think she is having an acute myocardial infarction.  She is chest pain-free and does not have any signs of ST segment elevations.  In fact, the patient's BUN is 23 and her creatinine is 1.1.  Patient may have a prerenal azotemia with dehydration with clinical third spacing.   The patient's albumin is low.  Upon reassessment, the patient is now without complaint.  Her blood pressures have improved.  She states that she is ready for discharge and will follow-up immediately with her cardiologist as scheduled in one day and one half.    The results and physical exam findings were reviewed with the patient. Pt agrees with assessment, disposition and treatment plan and has no further questions or complaints at this time.    RADHA Collier M.D. 4:26 PM 4/1/2017                    Scribe Attestation:   Scribe #1: I performed the above scribed service and the documentation accurately describes the services I performed. I attest to the accuracy of the note.    Attending Attestation:           Physician Attestation for Scribe:  Physician Attestation Statement for Scribe #1: I, Mauro Ferreira, reviewed documentation, as scribed by Bob Collier MD in my presence, and it is both accurate and complete.                 ED Course     Clinical Impression:   The primary encounter diagnosis was Dehydration. Diagnoses of Elevated troponin, Medication reaction, initial encounter, and Hypotension due to drugs were also pertinent to this visit.          Bob Collier MD  04/01/17 1044

## 2017-04-01 NOTE — ED TRIAGE NOTES
Pt presents to ED c/o possible drug side effects from taking new blood pressure meds.  More specifically a beta blocker.  Pt states she felt really weak today and says it was hard to just even walk and that is normal for her.

## 2017-04-03 ENCOUNTER — TELEPHONE (OUTPATIENT)
Dept: CARDIOLOGY | Facility: CLINIC | Age: 62
End: 2017-04-03

## 2017-04-03 ENCOUNTER — OFFICE VISIT (OUTPATIENT)
Dept: CARDIOLOGY | Facility: CLINIC | Age: 62
End: 2017-04-03
Payer: COMMERCIAL

## 2017-04-03 VITALS
BODY MASS INDEX: 34.6 KG/M2 | SYSTOLIC BLOOD PRESSURE: 115 MMHG | WEIGHT: 188 LBS | HEART RATE: 102 BPM | OXYGEN SATURATION: 96 % | DIASTOLIC BLOOD PRESSURE: 73 MMHG | HEIGHT: 62 IN

## 2017-04-03 DIAGNOSIS — I50.21 ACUTE SYSTOLIC CONGESTIVE HEART FAILURE: Primary | ICD-10-CM

## 2017-04-03 DIAGNOSIS — Z72.0 TOBACCO ABUSE: ICD-10-CM

## 2017-04-03 DIAGNOSIS — E11.21 CONTROLLED TYPE 2 DIABETES MELLITUS WITH DIABETIC NEPHROPATHY, WITHOUT LONG-TERM CURRENT USE OF INSULIN: ICD-10-CM

## 2017-04-03 DIAGNOSIS — Z82.49 FAMILY HISTORY OF CORONARY ARTERY DISEASE: ICD-10-CM

## 2017-04-03 DIAGNOSIS — E78.5 DYSLIPIDEMIA: ICD-10-CM

## 2017-04-03 DIAGNOSIS — I44.7 LBBB (LEFT BUNDLE BRANCH BLOCK): ICD-10-CM

## 2017-04-03 DIAGNOSIS — I10 ESSENTIAL HYPERTENSION: ICD-10-CM

## 2017-04-03 PROCEDURE — 4010F ACE/ARB THERAPY RXD/TAKEN: CPT | Mod: S$GLB,,, | Performed by: INTERNAL MEDICINE

## 2017-04-03 PROCEDURE — 3044F HG A1C LEVEL LT 7.0%: CPT | Mod: S$GLB,,, | Performed by: INTERNAL MEDICINE

## 2017-04-03 PROCEDURE — 1160F RVW MEDS BY RX/DR IN RCRD: CPT | Mod: S$GLB,,, | Performed by: INTERNAL MEDICINE

## 2017-04-03 PROCEDURE — 99999 PR PBB SHADOW E&M-EST. PATIENT-LVL III: CPT | Mod: PBBFAC,,, | Performed by: INTERNAL MEDICINE

## 2017-04-03 PROCEDURE — 3078F DIAST BP <80 MM HG: CPT | Mod: S$GLB,,, | Performed by: INTERNAL MEDICINE

## 2017-04-03 PROCEDURE — 3074F SYST BP LT 130 MM HG: CPT | Mod: S$GLB,,, | Performed by: INTERNAL MEDICINE

## 2017-04-03 PROCEDURE — 99214 OFFICE O/P EST MOD 30 MIN: CPT | Mod: S$GLB,,, | Performed by: INTERNAL MEDICINE

## 2017-04-03 NOTE — PROGRESS NOTES
Subjective:    Patient ID:  Hannah Montoya is a 61 y.o. female who presents for follow-up of Results      HPI  Patient is here for follow-up of newly diagnosed systolic heart failure.  She underwent diagnostic testing including nuclear stresses which shows significant LAD territory changes.  Her EF was confirmed to be severely depressed.  She did have some issues over the weekend with adjustment of medications.  We have decreased her carvedilol today.  She is to hold her lisinopril as well as her amlodipine.  She did hold her metformin for the procedure.  Otherwise her blood pressure is stable this a.m.  She denies any chest pain and her breathing is improved on diuretic therapy.  She denies any PND, orthopnea or lower edema.  She's not expressing dizziness, presyncope or syncope.    Review of Systems   Constitution: Negative.   HENT: Negative.    Eyes: Negative.    Cardiovascular: Positive for chest pain and dyspnea on exertion. Negative for irregular heartbeat, leg swelling, near-syncope, orthopnea, palpitations, paroxysmal nocturnal dyspnea and syncope.   Respiratory: Negative for shortness of breath.    Skin: Negative.    Musculoskeletal: Negative.    Gastrointestinal: Negative for abdominal pain, constipation and diarrhea.   Genitourinary: Negative for dysuria.   Neurological: Positive for dizziness.   Psychiatric/Behavioral: Negative.         Objective:    Physical Exam   Constitutional: She is oriented to person, place, and time. She appears well-developed and well-nourished.   HENT:   Head: Normocephalic and atraumatic.   Eyes: Conjunctivae and EOM are normal. Pupils are equal, round, and reactive to light.   Neck: Normal range of motion. Neck supple. No thyromegaly present.   Cardiovascular: Normal rate and regular rhythm.    No murmur heard.  Pulmonary/Chest: Effort normal and breath sounds normal. No respiratory distress.   Abdominal: Soft. Bowel sounds are normal.   Musculoskeletal: She exhibits  no edema.   Neurological: She is alert and oriented to person, place, and time.   Skin: Skin is warm and dry.   Psychiatric: She has a normal mood and affect. Her behavior is normal.       echo:  CONCLUSIONS     1 - Severely depressed left ventricular systolic function (EF 20-25%).     2 - Eccentric hypertrophy.     3 - Severe left atrial enlargement.     4 - Left ventricular diastolic dysfunction.     5 - Severely depressed right ventricular systolic function .     6 - Pulmonary hypertension. The estimated PA systolic pressure is 47 mmHg.     7 - Moderate mitral regurgitation.     8 - Intermediate central venous pressure.     9 - Small pericardial effusion.     NST:  Impression: ABNORMAL MYOCARDIAL PERFUSION  1. There is a large size fixed defect of severe intensity in the anteroapical wall of the left ventricle, consistent with myocardial injury. There is trivial lana-injury ischemia.   2. Resting wall motion is physiologic.   3. There is resting LV dysfunction with a reduced ejection fraction of 14 %.   4. The ventricular volumes are normal at rest and stress.   5. The extracardiac distribution of radioactivity is normal.     Assessment:       1. Acute systolic congestive heart failure    2. Essential hypertension    3. Dyslipidemia    4. Controlled type 2 diabetes mellitus with diabetic nephropathy, without long-term current use of insulin    5. Tobacco abuse    6. Family history of coronary artery disease    7. LBBB (left bundle branch block)         Plan:       -Continued symptoms with newly diagnosed, myopathy and strongly positive stress test and LAD territory likely indicating recent ischemic event with change in symptoms  -Discussed options will plan for right R catheterization via right common femoral artery    Return to clinic after the procedure    **Risks/benefits of the procedure were d/w the patient including bleeding, infection, death, mi, arrhythmia, kidney failure, stroke, etc.  Patient  understands and consent was placed on the chart.

## 2017-04-03 NOTE — PRE ADMISSION SCREENING
Spoke with patient.  Instructed to remain NPO after midnight, except to take Carvedilol with swallow of water.  To stop Metformin as directed until 2 days after procedure.  Expressed understanding of instructions.

## 2017-04-03 NOTE — MR AVS SNAPSHOT
[facility]  [address] [place] [address]  Phone: [phone]                  Hannah Montoya   4/3/2017 8:20 AM   Office Visit    Description:  Female : 1955   Provider:  Kevin Lopez MD   Department:  [facility]           Reason for Visit     Results                To Do List           Goals (5 Years of Data)     None      [facility]     [facility] Nurse Care Line -  Assistance  Unless otherwise directed by your provider, please contact [facility], our nurse care line that is available for  assistance.     Registered nurses in the [facility] provide: appointment scheduling, clinical advisement, health education, and other advisory services.  Call: [phone] (toll free)               Medications           Message regarding Medications     Verify the changes and/or additions to your medication regime listed below are the same as discussed with your clinician today.  If any of these changes or additions are incorrect, please notify your healthcare provider.        STOP taking these medications     albuterol (ACCUNEB) 1.25 mg/3 mL Nebu Use 1 vial IN NEBULIZER EVERY 6 HOURS AS NEEDED    amlodipine (NORVASC) 5 MG tablet Take 1 tablet (5 mg total) by mouth once daily.           Verify that the below list of medications is an accurate representation of the medications you are currently taking.  If none reported, the list may be blank. If incorrect, please contact your healthcare provider. Carry this list with you in case of emergency.           Current Medications     albuterol (PROAIR HFA) 90 mcg/actuation inhaler Inhale 2 puffs into the lungs every 6 (six) hours as needed. Rescue    albuterol-ipratropium 2.5mg-0.5mg/3mL (DUO-NEB) 0.5 mg-3 mg(2.5 mg base)/3 mL nebulizer solution     budesonide (PULMICORT FLEXHALER) 90 mcg/actuation AePB Inhale 2 puffs (180 mcg total) into the lungs 2 (two) times daily.    carvedilol  "(COREG) 6.25 MG tablet Take 4 tablets (25 mg total) by mouth 2 (two) times daily with meals.    fluticasone (FLONASE) 50 mcg/actuation nasal spray 1 spray by Each Nare route 2 (two) times daily.    furosemide (LASIX) 20 MG tablet Take 1 tablet (20 mg total) by mouth once daily.    lisinopril-hydrochlorothiazide (PRINZIDE,ZESTORETIC) 20-12.5 mg per tablet Take 2 tablets by mouth once daily.    metformin (GLUCOPHAGE) 500 MG tablet Take 1 tablet (500 mg total) by mouth 2 (two) times daily.    sertraline (ZOLOFT) 100 MG tablet Take 1 tablet (100 mg total) by mouth every evening.    STIOLTO RESPIMAT 2.5-2.5 mcg/actuation Mist INHALE 2 PUFFS BY MOUTH DAILY    TRUETEST TEST STRIPS Strp test once EVERY MORNING    alprazolam (XANAX) 0.25 MG tablet TAKE 1 TABLET(0.25 MG) BY MOUTH DAILY AS NEEDED FOR ANXIETY           Clinical Reference Information           Your Vitals Were     BP Pulse Height Weight Last Period SpO2    115/73 (BP Location: Left arm, Patient Position: Sitting, BP Method: Automatic) 102 5' 2" (1.575 m) 85.3 kg (188 lb) (LMP Unknown) 96%    BMI                34.39 kg/m2          Blood Pressure          Most Recent Value    BP  115/73      Allergies as of 4/3/2017     No Known Allergies      Immunizations Administered on Date of Encounter - 4/3/2017     None      Language Assistance Services     ATTENTION: Language assistance services are available, free of charge. Please call [phone].      ATENCIÓN: Si otf kierra, tiene a terry disposición servicios gratuitos de asistencia lingüística. Llame al [phone].     [facility] Ý: N?u b?n nói Ti?ng Vi?t, có các d?ch v? h? tr? ngôn ng? mi?n phí dành cho b?n. G?i s? [phone].         [facility] complies with applicable Federal civil rights laws and does not discriminate on the basis of race, color, national origin, age, disability, or sex.        "

## 2017-04-03 NOTE — TELEPHONE ENCOUNTER
----- Message from Elana Collins sent at 4/3/2017  3:30 PM CDT -----  Contact: self  Patient is asking if she has to fast prior to her procedure tomorrow ?   [phone]    LL

## 2017-04-04 ENCOUNTER — SURGERY (OUTPATIENT)
Age: 62
End: 2017-04-04

## 2017-04-04 ENCOUNTER — HOSPITAL ENCOUNTER (OUTPATIENT)
Dept: PREADMISSION TESTING | Facility: HOSPITAL | Age: 62
Discharge: HOME | End: 2017-04-04
Attending: INTERNAL MEDICINE
Payer: COMMERCIAL

## 2017-04-04 ENCOUNTER — HOSPITAL ENCOUNTER (OUTPATIENT)
Facility: HOSPITAL | Age: 62
Discharge: HOME | End: 2017-04-04
Attending: INTERNAL MEDICINE | Admitting: INTERNAL MEDICINE
Payer: COMMERCIAL

## 2017-04-04 VITALS
RESPIRATION RATE: 18 BRPM | HEART RATE: 95 BPM | TEMPERATURE: 98 F | OXYGEN SATURATION: 96 % | DIASTOLIC BLOOD PRESSURE: 89 MMHG | SYSTOLIC BLOOD PRESSURE: 114 MMHG

## 2017-04-04 VITALS
HEIGHT: 62 IN | HEART RATE: 93 BPM | BODY MASS INDEX: 34.96 KG/M2 | SYSTOLIC BLOOD PRESSURE: 116 MMHG | DIASTOLIC BLOOD PRESSURE: 74 MMHG | OXYGEN SATURATION: 97 % | WEIGHT: 190 LBS | TEMPERATURE: 98 F | RESPIRATION RATE: 17 BRPM

## 2017-04-04 DIAGNOSIS — I50.21 ACUTE SYSTOLIC CONGESTIVE HEART FAILURE: ICD-10-CM

## 2017-04-04 DIAGNOSIS — I10 ESSENTIAL (PRIMARY) HYPERTENSION: ICD-10-CM

## 2017-04-04 PROBLEM — I50.31 ACUTE DIASTOLIC CONGESTIVE HEART FAILURE: Status: RESOLVED | Noted: 2017-03-30 | Resolved: 2017-04-04

## 2017-04-04 LAB — POCT GLUCOSE: 156 MG/DL (ref 70–110)

## 2017-04-04 PROCEDURE — 99152 MOD SED SAME PHYS/QHP 5/>YRS: CPT | Mod: ,,, | Performed by: INTERNAL MEDICINE

## 2017-04-04 PROCEDURE — 93460 R&L HRT ART/VENTRICLE ANGIO: CPT

## 2017-04-04 PROCEDURE — 25000003 PHARM REV CODE 250: Performed by: INTERNAL MEDICINE

## 2017-04-04 PROCEDURE — 99152 MOD SED SAME PHYS/QHP 5/>YRS: CPT

## 2017-04-04 PROCEDURE — 63600175 PHARM REV CODE 636 W HCPCS

## 2017-04-04 PROCEDURE — 93460 R&L HRT ART/VENTRICLE ANGIO: CPT | Mod: 26,,, | Performed by: INTERNAL MEDICINE

## 2017-04-04 PROCEDURE — 25000003 PHARM REV CODE 250

## 2017-04-04 RX ORDER — SODIUM CHLORIDE 9 MG/ML
INJECTION, SOLUTION INTRAVENOUS CONTINUOUS
Status: DISCONTINUED | OUTPATIENT
Start: 2017-04-04 | End: 2017-04-04

## 2017-04-04 RX ORDER — ACETAMINOPHEN 325 MG/1
650 TABLET ORAL EVERY 6 HOURS PRN
COMMUNITY
End: 2017-12-12

## 2017-04-04 RX ORDER — FUROSEMIDE 20 MG/1
20 TABLET ORAL DAILY
COMMUNITY
End: 2017-04-06 | Stop reason: SDUPTHER

## 2017-04-04 RX ORDER — ALBUTEROL SULFATE 1.25 MG/3ML
1.25 SOLUTION RESPIRATORY (INHALATION) EVERY 6 HOURS
COMMUNITY
End: 2017-12-12 | Stop reason: SDUPTHER

## 2017-04-04 RX ADMIN — SODIUM CHLORIDE: 0.9 INJECTION, SOLUTION INTRAVENOUS at 09:04

## 2017-04-04 NOTE — H&P
HPI  Patient is here for follow-up of newly diagnosed systolic heart failure. She underwent diagnostic testing including nuclear stresses which shows significant LAD territory changes. Her EF was confirmed to be severely depressed. She did have some issues over the weekend with adjustment of medications. We have decreased her carvedilol today. She is to hold her lisinopril as well as her amlodipine. She did hold her metformin for the procedure. Otherwise her blood pressure is stable this a.m. She denies any chest pain and her breathing is improved on diuretic therapy. She denies any PND, orthopnea or lower edema. She's not expressing dizziness, presyncope or syncope.     Review of Systems   Constitution: Negative.   HENT: Negative.   Eyes: Negative.   Cardiovascular: Positive for chest pain and dyspnea on exertion. Negative for irregular heartbeat, leg swelling, near-syncope, orthopnea, palpitations, paroxysmal nocturnal dyspnea and syncope.   Respiratory: Negative for shortness of breath.   Skin: Negative.   Musculoskeletal: Negative.   Gastrointestinal: Negative for abdominal pain, constipation and diarrhea.   Genitourinary: Negative for dysuria.   Neurological: Positive for dizziness.   Psychiatric/Behavioral: Negative.         Objective:    Physical Exam   Constitutional: She is oriented to person, place, and time. She appears well-developed and well-nourished.   HENT:   Head: Normocephalic and atraumatic.   Eyes: Conjunctivae and EOM are normal. Pupils are equal, round, and reactive to light.   Neck: Normal range of motion. Neck supple. No thyromegaly present.   Cardiovascular: Normal rate and regular rhythm.   No murmur heard.  Pulmonary/Chest: Effort normal and breath sounds normal. No respiratory distress.   Abdominal: Soft. Bowel sounds are normal.   Musculoskeletal: She exhibits no edema.   Neurological: She is alert and oriented to person, place, and time.   Skin: Skin is warm and dry.   Psychiatric: She  has a normal mood and affect. Her behavior is normal.        echo:  CONCLUSIONS     1 - Severely depressed left ventricular systolic function (EF 20-25%).     2 - Eccentric hypertrophy.     3 - Severe left atrial enlargement.     4 - Left ventricular diastolic dysfunction.     5 - Severely depressed right ventricular systolic function .     6 - Pulmonary hypertension. The estimated PA systolic pressure is 47 mmHg.     7 - Moderate mitral regurgitation.     8 - Intermediate central venous pressure.     9 - Small pericardial effusion.    NST:  Impression: ABNORMAL MYOCARDIAL PERFUSION  1. There is a large size fixed defect of severe intensity in the anteroapical wall of the left ventricle, consistent with myocardial injury. There is trivial lana-injury ischemia.   2. Resting wall motion is physiologic.   3. There is resting LV dysfunction with a reduced ejection fraction of 14 %.   4. The ventricular volumes are normal at rest and stress.   5. The extracardiac distribution of radioactivity is normal.      Assessment:       1. Acute systolic congestive heart failure    2. Essential hypertension    3. Dyslipidemia    4. Controlled type 2 diabetes mellitus with diabetic nephropathy, without long-term current use of insulin    5. Tobacco abuse    6. Family history of coronary artery disease    7. LBBB (left bundle branch block)          Plan:       -Continued symptoms with newly diagnosed, myopathy and strongly positive stress test and LAD territory likely indicating recent ischemic event with change in symptoms  -Discussed options will plan for right R catheterization via right common femoral artery     Return to clinic after the procedure     **Risks/benefits of the procedure were d/w the patient including bleeding, infection, death, mi, arrhythmia, kidney failure, stroke, etc. Patient understands and consent was placed on the chart.

## 2017-04-04 NOTE — IP AVS SNAPSHOT
[place] [address] [place] [address]  Phone: [phone]           Patient Discharge Instructions   Our goal is to set you up for success. This packet includes information on your condition, medications, and your home care.  It will help you care for yourself to prevent having to return to the hospital.     Please ask your nurse if you have any questions.      There are many details to remember when preparing to leave the hospital. Here is what you will need to do:    1. Take your medicine. If you are prescribed medications, review your Medication List on the following pages. You may have new medications to  at the pharmacy and others that you'll need to stop taking. Review the instructions for how and when to take your medications. Talk with your doctor or nurses if you are unsure of what to do.     2. Go to your follow-up appointments. Specific follow-up information is listed in the following pages. Your may be contacted by a nurse or clinical provider about future appointments. Be sure we have all of the phone numbers to reach you. Please contact your provider's office if you are unable to make an appointment.     3. Watch for warning signs. Your doctor or nurse will give you detailed warning signs to watch for and when to call for assistance. These instructions may also include educational information about your condition. If you experience any of warning signs to your health, call your doctor.           [facility]  Unless otherwise directed by your provider, please   contact [facility], our nurse care line   that is available for 24/7 assistance.     [phone] (toll-free)     Registered nurses in the [facility]   provide: appointment scheduling, clinical advisement, health education, and other advisory services.                  ** Verify the list of medication(s) below is accurate and up to date. Carry this with you in case of emergency.  If your medications have changed, please notify your healthcare provider.             Medication List      ASK your doctor about these medications        Additional Info                      acetaminophen 325 MG tablet   Commonly known as:  TYLENOL   Refills:  0   Dose:  650 mg    Instructions:  Take 650 mg by mouth every 6 (six) hours as needed for Pain.     Begin Date    AM    Noon    PM    Bedtime       * albuterol 1.25 mg/3 mL Nebu   Commonly known as:  ACCUNEB   Refills:  0   Dose:  1.25 mg    Instructions:  Take 1.25 mg by nebulization every 6 (six) hours. Rescue     Begin Date    AM    Noon    PM    Bedtime       * albuterol 90 mcg/actuation inhaler   Commonly known as:  PROAIR HFA   Quantity:  17 g   Refills:  5   Dose:  2 puff   Comments:  This prescription was filled today(1/27/2017). Any refills authorized will be placed on file.    Instructions:  Inhale 2 puffs into the lungs every 6 (six) hours as needed. Rescue     Begin Date    AM    Noon    PM    Bedtime       albuterol-ipratropium 2.5mg-0.5mg/3mL 0.5 mg-3 mg(2.5 mg base)/3 mL nebulizer solution   Commonly known as:  DUO-NEB   Refills:  0      Begin Date    AM    Noon    PM    Bedtime       alprazolam 0.25 MG tablet   Commonly known as:  XANAX   Quantity:  20 tablet   Refills:  0    Instructions:  TAKE 1 TABLET(0.25 MG) BY MOUTH DAILY AS NEEDED FOR ANXIETY     Begin Date    AM    Noon    PM    Bedtime       budesonide 90 mcg/actuation Aepb   Commonly known as:  PULMICORT FLEXHALER   Quantity:  1 each   Refills:  6   Dose:  2 puff    Instructions:  Inhale 2 puffs (180 mcg total) into the lungs 2 (two) times daily.     Begin Date    AM    Noon    PM    Bedtime       carvedilol 6.25 MG tablet   Commonly known as:  COREG   Quantity:  60 tablet   Refills:  3   Dose:  25 mg    Instructions:  Take 4 tablets (25 mg total) by mouth 2 (two) times daily with meals.     Begin Date    AM    Noon    PM    Bedtime       fluticasone 50 mcg/actuation nasal spray    Commonly known as:  FLONASE   Quantity:  1 Bottle   Refills:  4   Dose:  1 spray    Instructions:  1 spray by Each Nare route 2 (two) times daily.     Begin Date    AM    Noon    PM    Bedtime       * furosemide 20 MG tablet   Commonly known as:  LASIX   Refills:  0   Dose:  20 mg    Instructions:  Take 20 mg by mouth once daily.     Begin Date    AM    Noon    PM    Bedtime       * furosemide 20 MG tablet   Commonly known as:  LASIX   Quantity:  5 tablet   Refills:  0   Dose:  20 mg    Instructions:  Take 1 tablet (20 mg total) by mouth once daily.     Begin Date    AM    Noon    PM    Bedtime       lisinopril-hydrochlorothiazide 20-12.5 mg per tablet   Commonly known as:  PRINZIDE,ZESTORETIC   Quantity:  60 tablet   Refills:  2   Dose:  2 tablet    Instructions:  Take 2 tablets by mouth once daily.     Begin Date    AM    Noon    PM    Bedtime       metformin 500 MG tablet   Commonly known as:  GLUCOPHAGE   Quantity:  60 tablet   Refills:  2   Dose:  500 mg    Instructions:  Take 1 tablet (500 mg total) by mouth 2 (two) times daily.     Begin Date    AM    Noon    PM    Bedtime       sertraline 100 MG tablet   Commonly known as:  ZOLOFT   Quantity:  90 tablet   Refills:  1   Dose:  100 mg   Comments:  This rx is not due until 09/20/16. However, this pt. is in our easy rx program so we send the request early so that the pt. does not run out of meds. If any questions please call [phone]    Instructions:  Take 1 tablet (100 mg total) by mouth every evening.     Begin Date    AM    Noon    PM    Bedtime       STIOLTO RESPIMAT 2.5-2.5 mcg/actuation Mist   Quantity:  4 g   Refills:  0   Generic drug:  tiotropium-olodaterol    Instructions:  INHALE 2 PUFFS BY MOUTH DAILY     Begin Date    AM    Noon    PM    Bedtime       TRUETEST TEST STRIPS Strp   Quantity:  90 each   Refills:  3   Comments:  This Rx is not due & will not be filled until 06/12/16. However this Pt is in our Easy Rx Program, so we send a request  early so the Pt does not run put of medication. If any questions please call [phone]   Generic drug:  blood sugar diagnostic    Instructions:  test once EVERY MORNING     Begin Date    AM    Noon    PM    Bedtime       * Notice:  This list has 4 medication(s) that are the same as other medications prescribed for you. Read the directions carefully, and ask your doctor or other care provider to review them with you.               Please bring to all follow up appointments:    1. A copy of your discharge instructions.  2. All medicines you are currently taking in their original bottles.  3. Identification and insurance card.    Please arrive 15 minutes ahead of scheduled appointment time.    Please call 24 hours in advance if you must reschedule your appointment and/or time.        Your Scheduled Appointments     Apr 17, 2017  1:20 PM CDT   Post OP with Kevin Lopez MD   [facility] - Cardiology ([facility])    [address] [facility] [address] [place] [id number] [phone]              Follow-up Information     Follow up with Kevin Lopez MD.    Specialty:  Cardiology    Contact information:    [address] [place] [address]  [facility] [address]  [phone]            Discharge Instructions       Mynx Vascular Closure Device     -Re apply a clean, dry band aid and every day for five days or until a scab has formed at the site.  Change the band aid as needed  -Keep the site dry and clean.  -You may shower 24 hours after the procedure, but do not bathe or use a pool until the wound had completely closed.  -Gently clean your puncture site with soap and warm water.  -After showering , gently pat-dry the site with a clean towel; then let the site air dry before covering with a band aid  -Limit tight fitting clothes or underwear that my irritate the puncture site until the site has healed.    Daily Activities    Do not drive, drink alcohol, or sign legal documents for 24 hours, or if taking narcotic pain  medication.    Day of discharge  -No driving  Modify activity for 3-5 days  -No heavy lifting of anything over 5 pounds  (equivalent to a 1/2 gallon of milk)  -No pushing or pulling.  -No vigorous activity or straining  -Avoid stairs unless necessary : if necessary, take them slowly.  -Coughing, sneezing, or straining for a bowel movement:  Support your groin by pressing with your palm on top of the dressing/bandage.  -Sexual activity : check with your doctor  -No strenuous exercise.  -Avoid driving unless necessary.  Talk to your doctor about returning back to work, which depends on your type of work., your procedure and any medication you might be taking.        Discharge References/Attachments     CARDIAC CATHETERIZATION, DISCHARGE INSTRUCTIONS FOR (ENGLISH)        Admission Information     Date & Time Provider Department CSN    4/4/2017  8:45 AM Kevin Lopez MD [facility] [id number]      Care Providers     Provider Role Specialty Primary office phone    Kevin Lopez MD Attending Provider Cardiology [phone]    Kevin Lopez MD Surgeon  Cardiology [phone]      Your Vitals Were     BP Pulse Temp Resp Last Period SpO2    121/82 89 98 °F (36.7 °C) (Oral) 18 (LMP Unknown) 92%      Recent Lab Values        6/3/2015 7/5/2016 1/4/2017                     7:31 AM  4:11 PM  8:30 AM         A1C 7.8 (H) 6.3 (H) 6.2         Comment for A1C at  4:11 PM on 7/5/2016:  According to ADA guidelines, hemoglobin A1C <7.0% represents  optimal control in non-pregnant diabetic patients.  Different  metrics may apply to specific populations.   Standards of Medical Care in Diabetes - 2016.  For the purpose of screening for the presence of diabetes:  <5.7%     Consistent with the absence of diabetes  5.7-6.4%  Consistent with increasing risk for diabetes   (prediabetes)  >or=6.5%  Consistent with diabetes  Currently no consensus exists for use of hemoglobin A1C  for diagnosis of diabetes for  children.      Comment for A1C at  8:30 AM on 1/4/2017:  According to ADA guidelines, hemoglobin A1C <7.0% represents  optimal control in non-pregnant diabetic patients.  Different  metrics may apply to specific populations.   Standards of Medical Care in Diabetes - 2016.  For the purpose of screening for the presence of diabetes:  <5.7%     Consistent with the absence of diabetes  5.7-6.4%  Consistent with increasing risk for diabetes   (prediabetes)  >or=6.5%  Consistent with diabetes  Currently no consensus exists for use of hemoglobin A1C  for diagnosis of diabetes for children.        Allergies as of 4/4/2017     No Known Allergies      Advance Directives     An advance directive is a document which, in the event you are no longer able to make decisions for yourself, tells your healthcare team what kind of treatment you do or do not want to receive, or who you would like to make those decisions for you.  If you do not currently have an advance directive, [facility] encourages you to create one.  For more information call:  [phone] ([phone]), [phone] ([phone]),  or log on to [email or web address].        Smoking Cessation     If you would like to quit smoking:   You may be eligible for free services if you are a [place] resident and started smoking cigarettes before September 1, 1988.  Call the Smoking Cessation Trust ([facility]) toll free at [phone] or [phone].   Call 3-035-QUIT-NOW if you do not meet the above criteria.   Contact us via email: [email or web address]   View our website for more information: [email or web address]        Language Assistance Services     ATTENTION: Language assistance services are available, free of charge. Please call [phone].      ATENCIÓN: Si habla español, tiene a terry disposición servicios gratuitos de asistencia lingüística. Llame al [phone].     CHÚ Ý: N?u b?n nói Ti?ng Vi?t, có các d?ch v? h? tr? ngôn ng? mi?n [facility] dàn  nanci paniagua?n. G?i s? [phone].        Heart Failure Education       Heart Failure: Being Active  You have a condition called heart failure. Being active doesnt mean that you have to wear yourself out. Even a little movement each day helps to strengthen your heart. If you cant get out to exercise, you can do simple stretching and strengthening exercises at home. These are good ways to keep you well-conditioned and prevent you and your heart from becoming excessively weak.    Ideas to get you started  · Add a little movement to things you do now. Walk to mail letters. Park your car at the far end of the parking lot and walk to the store. Walk up a flight of stairs instead of taking the elevator.  · Choose activities you enjoy. You might walk, swim, or ride an exercise bike. Things like gardening and washing the car count, too. Other possibilities include: washing dishes, walking the dog, walking around the mall, and doing aerobic activities with friends.  · Join a group exercise program at a [facility] or [facility], a senior center, or a community center. Or look into a hospital cardiac rehabilitation program. Ask your doctor if you qualify.  Tips to keep you going  · Get up and get dressed each day. Go to a coffee shop and read a newspaper or go somewhere that you'll be in the presence of other active people. Youll feel more like being active.  · Make a plan. Choose one or more activities that you enjoy and that you can easily do. Then plan to do at least one each day. You might write your plan on a calendar.  · Go with a friend or a group if you like company. This can help you feel supported and stay motivated, too.  · Plan social events that you enjoy. This will keep you mentally engaged as well as physically motivated to do things you find pleasure in.  For your safety  · Talk with your healthcare provider before starting an exercise program.  · Exercise indoors when its too hot or too cold outside, or when the air  quality is poor. Try walking at a shopping mall.  · Wear socks and sturdy shoes to maintain your balance and prevent falls.  · Start slowly. Do a few minutes several times a day at first. Increase your time and speed little by little.  · Stop and rest whenever you feel tired or get short of breath.  · Dont push yourself on days when you dont feel well.  Date Last Reviewed: 3/20/2016  © 2536-5902 [employer]. [address] [place] [address]. All rights reserved. This information is not intended as a substitute for professional medical care. Always follow your healthcare professional's instructions.              Heart Failure: Evaluating Your Heart  You have a condition called heart failure. To evaluate your condition, your doctor will examine you, ask questions, and do some tests. Along with looking for signs of heart failure, the doctor looks for any other health problems that may have led to heart failure. The results of your evaluation will help your doctor form a treatment plan.  Health history and physical exam  Your visit will start with a health history. Tell the doctor about any symptoms youve noticed and about all medicines you take. Then youll have a physical exam. This includes listening to your heartbeat and breathing. Youll also be checked for swelling (edema) in your legs and neck. When you have fluid buildup or fluid in the lungs, it may be called congestive heart failure.  Diagnosing heart failure     During an echocardiogram, sound waves bounce off the heart. These are converted into a picture on the screen.   The following may be done to help your doctor form a diagnosis:  · X-rays show the size and shape of your heart. These pictures can also show fluid in your lungs.  · An electrocardiogram (ECG or EKG) shows the pattern of your heartbeat. Small pads (electrodes) are placed on your chest, arms, and legs. Wires connect the pads to the ECG machine, which records your  hearts electrical signals. This can give the doctor information about heart function.  · An echocardiogram uses ultrasound waves to show the structure and movement of your heart muscle. This shows how well the heart pumps. It also shows the thickness of the heart walls, and if the heart is enlarged. It is one of the most useful, non-invasive tests as it provides information about the heart's general function. This helps your doctor make treatment decisions.  · Lab tests evaluate small amounts of blood or urine for signs of problems. A BNP lab test can help diagnose and evaluate heart failure. BNP stands for B-type natriuretic peptide. The ventricles secrete more BNP when heart failure worsens. Lab tests can also provide information about metabolic dysfunction or heart dysfunction.  Your treatment plan  Based on the results of your evaluation and tests, your doctor will develop a treatment plan. This plan is designed to relieve some of your heart failure symptoms and help make you more comfortable. Your treatment plan may include:  · Medicine to help your heart work better and improve your quality of life  · Changes in what you eat and drink to help prevent fluid from backing up in your body  · Daily monitoring of your weight and heart failure symptoms to see how well your treatment plan is working  · Exercise to help you stay healthy  · Help with quitting smoking  · Emotional and psychological support to help adjust to the changes  · Referrals to other specialists to make sure you are being treated comprehensively  Date Last Reviewed: 3/21/2016  © 6839-5321 [employer]. [address] [place] [address]. All rights reserved. This information is not intended as a substitute for professional medical care. Always follow your healthcare professional's instructions.              Heart Failure: Making Changes to Your Diet  You have a condition called heart failure. When you have heart failure,  excess fluid is more likely to build up in your body because your heart isn't working well. This makes the heart work harder to pump blood. Fluid buildup causes symptoms such as shortness of breath and swelling (edema). This is often referred to as congestive heart failure or CHF. Controlling the amount of salt (sodium) you eat may help stop fluid from building up. Your doctor may also tell you to reduce the amount of fluid you drink.  Reading food labels    Your healthcare provider will tell you how much sodium you can eat each day. Read food labels to keep track. Keep in mind that certain foods are high in salt. These include canned, frozen, and processed foods. Check the amount of sodium in each serving. Watch out for high-sodium ingredients. These include MSG (monosodium glutamate), baking soda, and sodium phosphate.   Eating less salt  Give yourself time to get used to eating less salt. It may take a little while. Here are some tips to help:  · Take the saltshaker off the table. Replace it with salt-free herb mixes and spices.  · Eat fresh or plain frozen vegetables. These have much less salt than canned vegetables.  · Choose low-sodium snacks like sodium-free pretzels, crackers, or air-popped popcorn.  · Dont add salt to your food when youre cooking. Instead, season your foods with pepper, lemon, garlic, or onion.  · When you eat out, ask that your food be cooked without added salt.  · Avoid eating fried foods as these often have a great deal of salt.  If youre told to limit fluids  You may need to limit how much fluid you have to help prevent swelling. This includes anything that is liquid at room temperature, such as ice cream and soup. If your doctor tells you to limit fluid, try these tips:  · Measure drinks in a measuring cup before you drink them. This will help you meet daily goals.  · Chill drinks to make them more refreshing.  · Suck on frozen lemon wedges to quench thirst.  · Only drink when  youre thirsty.  · Chew sugarless gum or suck on hard candy to keep your mouth moist.  · Weigh yourself daily to know if your body's fluid content is rising.  My sodium goal  Your healthcare provider may give you a sodium goal to meet each day. This includes sodium found in food as well as salt that you add. My goal is to eat no more than ___________ mg of sodium per day.     When to call your doctor  Call your doctor right away if you have any symptoms of worsening heart failure. These can include:  · Sudden weight gain  · Increased swelling of your legs or ankles  · Trouble breathing when youre resting or at night  · Increase in the number of pillows you have to sleep on  · Chest pain, pressure, discomfort, or pain in the jaw, neck, or back   Date Last Reviewed: 3/21/2016  © 8933-4304 [employer]. [address] [place] [address]. All rights reserved. This information is not intended as a substitute for professional medical care. Always follow your healthcare professional's instructions.              Heart Failure: Medicines to Help Your Heart    You have a condition called heart failure (also known as congestive heart failure, or CHF). Your doctor will likely prescribe medicines for heart failure and any underlying health problems you have. Most heart failure patients take one or more types of medicinen. Your healthcare provider will work to find the combination of medicines that works best for you.  Heart failure medicines  Here are the most common heart failure medicines:  · ACE inhibitors lower blood pressure and decrease strain on the heart. This makes it easier for the heart to pump. Angiotensin receptor blockers have similar effects. These are prescribed for some patients instead of ACE inhibitors.  · Beta-blockers relieve stress on the heart. They also improve symptoms. They may also improve the heart's pumping action over time.  · Diuretics (also called water pills) help rid your  body of excess water. This can help rid your body of swelling (edema). Having less fluid to pump means your heart doesnt have to work as hard. Some diuretics make your body lose a mineral called potassium. Your doctor will tell you if you need to take supplements or eat more foods high in potassium.  · Digoxin helps your heart pump with more strength. This helps your heart pump more blood with each beat. So, more oxygen-rich blood travels to the rest of the body.  · Aldosterone antagonists help alter hormones and decrease strain on the heart.  · Hydralazine and nitrates are two separate medicines used together to treat heart failure. They may come in one combination pill. They lower blood pressure and decrease how hard the heart has to pump.  Medicines for related conditions  Controlling other heart problems helps keep heart failure under control, too. Depending on other heart problems you have, medicines may be prescribed to:  · Lower blood pressure (antihypertensives).  · Lower cholesterol levels (statins).  · Prevent blood clots (anticoagulants or aspirin).  · Keep the heartbeat steady (antiarrhythmics).  Date Last Reviewed: 3/5/2016  © 6293-2011 [employer]. [address] [place] [address]. All rights reserved. This information is not intended as a substitute for professional medical care. Always follow your healthcare professional's instructions.              Heart Failure: Procedures That May Help    The heart is a muscle that pumps oxygen-rich blood to all parts of the body. When you have heart failure, the heart is not able to pump as well as it should. Blood and fluid may back up into the lungs (congestive heart failure), and some parts of the body dont get enough oxygen-rich blood to work normally. These problems lead to the symptoms of heart failure.     Certain procedures may help the heart pump better in some cases of heart failure. Some procedures are done to treat health  problems that may have caused the heart failure such as coronary artery disease or heart rhythm problems. For more serious heart failure, other options are available.  Treating artery and valve problems  If you have coronary artery disease or valve disease, procedures may be done to improve blood flow. This helps the heart pump better, which can improve heart failure symptoms. First, your doctor may do a cardiac catheterization to help detect clogged blood vessels or valve damage. During this procedure, a  thin tube (catheter) in inserted into a blood vessel and guided to the heart. There a dye is injected and a special type of X-ray (angiogram) is taken of the blood vessels. Procedures to open a blocked artery or fix damaged valves can also be done using catheterization.  · Angioplasty uses a balloon-tipped instrument at the end of the catheter. The balloon is inflated to widen the narrowed artery. In many cases, a stent is expanded to further support the narrowed artery. A stent is a metal mesh tube.  · Valve surgery repairs or replacement of faulty valves can also be done during catheterization so blood can flow properly through the chambers of the heart.  Bypass surgery is another option to help treat blocked arteries. It uses a healthy blood vessel from elsewhere in the body. The healthy blood vessel is attached above and below the blocked area so that blood can flow around the blocked artery.  Treating heart rhythm problems  A device may be placed in the chest to help a weak heart maintain a healthy, heartbeat so the heart can pump more effectively:  · Pacemaker. A pacemaker is an implanted device that regulates your heartbeat electronically. It monitors your heart's rhythm and generates a painless electric impulse that helps the heart beat in a regular rhythm. A pacemaker is programmed to meet your specific heart rhythm needs.  · Biventricular pacing/cardiac resynchronization therapy. A type of pacemaker that  paces both pumping chambers of the heart at the same time to coordinate contractions and to improve the heart's function. Some people with heart failure are candidates for this therapy.  · Implantable cardioverter defibrillator. A device similar to a pacemaker that senses when the heart is beating too fast and delivers an electrical shock to convert the fast rhythm to a normal rhythm. This can be a life saving device.  In severe cases  In more serious cases of heart failure when other treatments no longer work, other options may include:  · Ventricular assist devices (VADs). These are mechanical devices used to take over the pumping function for one or both of the heart's ventricles, or pumping chambers. A VAD may be necessary when heart failure progresses to the point that medicines and other treatments no longer help. In some cases, a VAD may be used as a bridge to transplant.  · Heart transplant. This is replacing the diseased heart with a healthy one from a donor. This is an option for a few people who are very sick. A heart transplant is very serious and not an option for all patients. Your doctor can tell you more.  Date Last Reviewed: 3/20/2016  © 1379-5316 [employer]. [address] [place] [address]. All rights reserved. This information is not intended as a substitute for professional medical care. Always follow your healthcare professional's instructions.              Heart Failure: Tracking Your Weight  You have a condition called heart failure. When you have heart failure, a sudden weight gain or a steady rise in weight is a warning sign that your body is retaining too much water and salt. This could mean your heart failure is getting worse. If left untreated, it can cause problems for your lungs and result in shortness of breath. Weighing yourself each day is the best way to know if youre retaining water. If your weight goes up quickly, call your doctor. You will be given  instructions on how to get rid of the excess water. You will likely need medicines and to avoid salt. This will help your heart work better.  Call your doctor if you gain more than 2 pounds in 1 day, more than 5 pounds in 1 week, or whatever weight gain you were told to report by your doctor. This is often a sign of worsening heart failure and needs to be evaluated and treated. Your doctor will tell you what to do next.   Tips for weighing yourself    · Weigh yourself at the same time each morning, wearing the same clothes. Weigh yourself after urinating and before eating.  · Use the same scale each day. Make sure the numbers are easy to read. Put the scale on a flat, hard surface -- not on a rug or carpet.  · Do not stop weighing yourself. If you forget one day, weigh again the next morning.  How to use your weight chart  · Keep your weight chart near the scale. Write your weight on the chart as soon as you get off the scale.  · Fill in the month and the start date on the chart. Then write down your weight each day. Your chart will look like this:    · If you miss a day, leave the space blank. Weigh yourself the next day and write your weight in the next space.  · Take your weight chart with you when you go to see your doctor.  Date Last Reviewed: 3/20/2016  © 4187-1892 [employer]. [address] [place] [address], [place] [address]. All rights reserved. This information is not intended as a substitute for professional medical care. Always follow your healthcare professional's instructions.              Heart Failure: Warning Signs of a Flare-Up  You have a condition called heart failure. Once you have heart failure, flare-ups can happen. Below are signs that can mean your heart failure is getting worse. If you notice any of these warning signs, call your healthcare provider.  Swelling    · Your feet, ankles, or lower legs get puffier.  · You notice skin changes on your lower legs.  · Your shoes feel too  tight.  · Your clothes are tighter in the waist.  · You have trouble getting rings on or off your fingers.  Shortness of breath  · You have to breathe harder even when youre doing your normal activities or when youre resting.  · You are short of breath walking up stairs or even short distances.  · You wake up at night short of breath or coughing.  · You need to use more pillows or sit up to sleep.  · You wake up tired or restless.  Other warning signs  · You feel weaker, dizzy, or more tired.  · You have chest pain or changes in your heartbeat.  · You have a cough that wont go away.  · You cant remember things or dont feel like eating.  Tracking your weight  Gaining weight is often the first warning sign that heart failure is getting worse. Gaining even a few pounds can be a sign that your body is retaining excess water and salt. Weighing yourself each day in the morning after you urinate and before you eat, is the best way to know if you're retaining water. Get a scale that is easy to read and make sure you wear the same clothes and use the same scale every time you weigh. Your healthcare provider will show you how to track your weight. Call your doctor if you gain more than 2 pounds in 1 day, 5 pounds in 1 week, or whatever weight gain you were told to report by your doctor. This is often a sign of worsening heart failure and needs to be evaluated and treated before it compromises your breathing. Your doctor will tell you what to do next.    Date Last Reviewed: 3/15/2016  © 6182-8942 [employer]. [address] [place] [address], [place] [address]. All rights reserved. This information is not intended as a substitute for professional medical care. Always follow your healthcare professional's instructions.              Diabetes Discharge Instructions                                    [facility] complies with applicable Federal civil rights laws and does not discriminate on the basis of  race, color, national origin, age, disability, or sex.

## 2017-04-04 NOTE — BRIEF OP NOTE
[facility]  Brief Operative Note     SUMMARY     Surgery Date: 4/4/2017     Surgeon(s) and Role:     * Kevin Lopez MD - Primary    Assisting Surgeon: None    Pre-op Diagnosis:  Acute systolic congestive heart failure [I50.21]    Post-op Diagnosis:  Post-Op Diagnosis Codes:     * Acute systolic congestive heart failure [I50.21]    Procedure(s) (LRB):  HEART CATH-BILATERAL (Bilateral)    Anesthesia: Choice    Description of the findings of the procedure: Right and left heart catheterization via right common femoral artery and vein    Findings/Key Components: No significant observed plaque.  LV function is severely reduced EF 10% with elevated filling pressures and moderate pulmonary hypertension.    Recommendation:  -IV diuresis now  -Increase maintenance Lasix  -Optimize medicines for secondary prevention  -Reevaluate EF in 3 months and consider biventricular ICD    Estimated Blood Loss: Less than 50 cc         Specimens:   Specimen     None          Discharge Note    SUMMARY     Admit Date: 4/4/2017    Discharge Date and Time:  04/04/2017 10:40 AM    Hospital Course (synopsis of major diagnoses, care, treatment, and services provided during the course of the hospital stay): Elective admission for right R catheterization with newly diagnosed car to myopathy and abnormal stress test.  She is found to have likely nonischemic cardiomyopathy with elevated filling pressures.  IV diuresis was given.  We will continue optimize medicines for secondary prevention.     Final Diagnosis: Post-Op Diagnosis Codes:     * Acute systolic congestive heart failure [I50.21]    Disposition: Home or Self Care    Follow Up/Patient Instructions:     Medications:  Reconciled Home Medications:   Current Discharge Medication List      CONTINUE these medications which have NOT CHANGED    Details   albuterol (ACCUNEB) 1.25 mg/3 mL Nebu Take 1.25 mg by nebulization every 6 (six) hours. Rescue      budesonide (PULMICORT  FLEXHALER) 90 mcg/actuation AePB Inhale 2 puffs (180 mcg total) into the lungs 2 (two) times daily.  Qty: 1 each, Refills: 6    Associated Diagnoses: Chronic bronchitis, unspecified chronic bronchitis type      carvedilol (COREG) 6.25 MG tablet Take 4 tablets (25 mg total) by mouth 2 (two) times daily with meals.  Qty: 60 tablet, Refills: 3      !! furosemide (LASIX) 20 MG tablet Take 1 tablet (20 mg total) by mouth once daily.  Qty: 5 tablet, Refills: 0    Associated Diagnoses: Bilateral edema of lower extremity      metformin (GLUCOPHAGE) 500 MG tablet Take 1 tablet (500 mg total) by mouth 2 (two) times daily.  Qty: 60 tablet, Refills: 2      sertraline (ZOLOFT) 100 MG tablet Take 1 tablet (100 mg total) by mouth every evening.  Qty: 90 tablet, Refills: 1    Comments: This rx is not due until 09/20/16. However, this pt. is in our easy rx program so we send the request early so that the pt. does not run out of meds. If any questions please call [phone]      STIOLTO RESPIMAT 2.5-2.5 mcg/actuation Mist INHALE 2 PUFFS BY MOUTH DAILY  Qty: 4 g, Refills: 0    Associated Diagnoses: COPD with acute exacerbation      acetaminophen (TYLENOL) 325 MG tablet Take 650 mg by mouth every 6 (six) hours as needed for Pain.      albuterol (PROAIR HFA) 90 mcg/actuation inhaler Inhale 2 puffs into the lungs every 6 (six) hours as needed. Rescue  Qty: 17 g, Refills: 5    Comments: This prescription was filled today(1/27/2017). Any refills authorized will be placed on file.  Associated Diagnoses: Simple chronic bronchitis      albuterol-ipratropium 2.5mg-0.5mg/3mL (DUO-NEB) 0.5 mg-3 mg(2.5 mg base)/3 mL nebulizer solution       alprazolam (XANAX) 0.25 MG tablet TAKE 1 TABLET(0.25 MG) BY MOUTH DAILY AS NEEDED FOR ANXIETY  Qty: 20 tablet, Refills: 0      fluticasone (FLONASE) 50 mcg/actuation nasal spray 1 spray by Each Nare route 2 (two) times daily.  Qty: 1 Bottle, Refills: 4    Associated Diagnoses: Seasonal allergic rhinitis,  unspecified allergic rhinitis trigger      !! furosemide (LASIX) 20 MG tablet Take 20 mg by mouth once daily.      lisinopril-hydrochlorothiazide (PRINZIDE,ZESTORETIC) 20-12.5 mg per tablet Take 2 tablets by mouth once daily.  Qty: 60 tablet, Refills: 2      TRUETEST TEST STRIPS Strp test once EVERY MORNING  Qty: 90 each, Refills: 3    Comments: This Rx is not due & will not be filled until 06/12/16. However this Pt is in our Easy Rx Program, so we send a request early so the Pt does not run put of medication. If any questions please call [phone]  Associated Diagnoses: DM type 2, goal A1C below 8.0       !! - Potential duplicate medications found. Please discuss with provider.        No discharge procedures on file.     Diet cardiac  Activity as tolerated  Routine post catheterization instructions given

## 2017-04-04 NOTE — DISCHARGE INSTRUCTIONS
[employer] Vascular Closure Device     -Re apply a clean, dry band aid and every day for five days or until a scab has formed at the site.  Change the band aid as needed  -Keep the site dry and clean.  -You may shower 24 hours after the procedure, but do not bathe or use a pool until the wound had completely closed.  -Gently clean your puncture site with soap and warm water.  -After showering , gently pat-dry the site with a clean towel; then let the site air dry before covering with a band aid  -Limit tight fitting clothes or underwear that my irritate the puncture site until the site has healed.    Daily Activities    Do not drive, drink alcohol, or sign legal documents for 24 hours, or if taking narcotic pain medication.    Day of discharge  -No driving  Modify activity for 3-5 days  -No heavy lifting of anything over 5 pounds  (equivalent to a 1/2 gallon of milk)  -No pushing or pulling.  -No vigorous activity or straining  -Avoid stairs unless necessary : if necessary, take them slowly.  -Coughing, sneezing, or straining for a bowel movement:  Support your groin by pressing with your palm on top of the dressing/bandage.  -Sexual activity : check with your doctor  -No strenuous exercise.  -Avoid driving unless necessary.  Talk to your doctor about returning back to work, which depends on your type of work., your procedure and any medication you might be taking.

## 2017-04-05 ENCOUNTER — TELEPHONE (OUTPATIENT)
Dept: FAMILY MEDICINE | Facility: CLINIC | Age: 62
End: 2017-04-05

## 2017-04-05 ENCOUNTER — PATIENT MESSAGE (OUTPATIENT)
Dept: CARDIOLOGY | Facility: CLINIC | Age: 62
End: 2017-04-05

## 2017-04-05 NOTE — TELEPHONE ENCOUNTER
----- Message from Breann Avina sent at 4/4/2017  4:46 PM CDT -----  Contact: Pulmonary Medicine [phone]  Pulmonary medicine is requesting the pt last office notes and any latest chest x ray and pst as well faxed to [phone] Thanks !

## 2017-04-06 ENCOUNTER — TELEPHONE (OUTPATIENT)
Dept: CARDIOLOGY | Facility: CLINIC | Age: 62
End: 2017-04-06

## 2017-04-06 RX ORDER — FUROSEMIDE 20 MG/1
40 TABLET ORAL DAILY
Qty: 30 TABLET | Refills: 6 | Status: SHIPPED | OUTPATIENT
Start: 2017-04-06 | End: 2017-04-17 | Stop reason: SDUPTHER

## 2017-04-17 ENCOUNTER — OFFICE VISIT (OUTPATIENT)
Dept: CARDIOLOGY | Facility: CLINIC | Age: 62
End: 2017-04-17
Payer: COMMERCIAL

## 2017-04-17 VITALS
HEART RATE: 89 BPM | OXYGEN SATURATION: 97 % | SYSTOLIC BLOOD PRESSURE: 113 MMHG | DIASTOLIC BLOOD PRESSURE: 68 MMHG | HEIGHT: 62 IN | WEIGHT: 188.25 LBS | BODY MASS INDEX: 34.64 KG/M2

## 2017-04-17 DIAGNOSIS — E78.5 DYSLIPIDEMIA: ICD-10-CM

## 2017-04-17 DIAGNOSIS — E11.21 CONTROLLED TYPE 2 DIABETES MELLITUS WITH DIABETIC NEPHROPATHY, WITHOUT LONG-TERM CURRENT USE OF INSULIN: ICD-10-CM

## 2017-04-17 DIAGNOSIS — I10 ESSENTIAL (PRIMARY) HYPERTENSION: ICD-10-CM

## 2017-04-17 DIAGNOSIS — I44.7 LBBB (LEFT BUNDLE BRANCH BLOCK): ICD-10-CM

## 2017-04-17 DIAGNOSIS — Z72.0 TOBACCO ABUSE: ICD-10-CM

## 2017-04-17 DIAGNOSIS — I50.21 ACUTE SYSTOLIC CONGESTIVE HEART FAILURE: Primary | ICD-10-CM

## 2017-04-17 PROCEDURE — 3074F SYST BP LT 130 MM HG: CPT | Mod: S$GLB,,, | Performed by: INTERNAL MEDICINE

## 2017-04-17 PROCEDURE — 1160F RVW MEDS BY RX/DR IN RCRD: CPT | Mod: S$GLB,,, | Performed by: INTERNAL MEDICINE

## 2017-04-17 PROCEDURE — 99214 OFFICE O/P EST MOD 30 MIN: CPT | Mod: S$GLB,,, | Performed by: INTERNAL MEDICINE

## 2017-04-17 PROCEDURE — 3044F HG A1C LEVEL LT 7.0%: CPT | Mod: S$GLB,,, | Performed by: INTERNAL MEDICINE

## 2017-04-17 PROCEDURE — 99999 PR PBB SHADOW E&M-EST. PATIENT-LVL III: CPT | Mod: PBBFAC,,, | Performed by: INTERNAL MEDICINE

## 2017-04-17 PROCEDURE — 3078F DIAST BP <80 MM HG: CPT | Mod: S$GLB,,, | Performed by: INTERNAL MEDICINE

## 2017-04-17 PROCEDURE — 4010F ACE/ARB THERAPY RXD/TAKEN: CPT | Mod: S$GLB,,, | Performed by: INTERNAL MEDICINE

## 2017-04-17 RX ORDER — FUROSEMIDE 40 MG/1
40 TABLET ORAL 2 TIMES DAILY
Qty: 60 TABLET | Refills: 3 | Status: SHIPPED | OUTPATIENT
Start: 2017-04-17 | End: 2017-08-24 | Stop reason: SDUPTHER

## 2017-04-17 NOTE — PROGRESS NOTES
Subjective:    Patient ID:  Hannah Montoya is a 61 y.o. female who presents for follow-up of No chief complaint on file.      HPI  Patient is here for follow-up of newly diagnosed systolic heart failure.  She underwent right and left heart catheterization.  This showed no significant coronary artery disease mildly elevated filling pressures as below.  She denies any issues since catheterization.  She does have some mild lower extremity swelling.  She denies any chest pain, shortness of breath or palpitations.  She's expands no PND or orthopnea.  She is not expressing dizziness, presyncope or syncope.  We again discussed secondhand tobacco exposure.  Otherwise she's better usual state of health.  She's tolerating her medicines after adjustment.  Her blood pressure heart rate are stable today.    Review of Systems   Constitution: Negative.   HENT: Negative.    Eyes: Negative.    Cardiovascular: Negative for chest pain, dyspnea on exertion, irregular heartbeat, leg swelling, near-syncope, orthopnea, palpitations, paroxysmal nocturnal dyspnea and syncope.   Respiratory: Negative for shortness of breath.    Skin: Negative.    Musculoskeletal: Negative.    Gastrointestinal: Negative for abdominal pain, constipation and diarrhea.   Genitourinary: Negative for dysuria.   Neurological: Negative.    Psychiatric/Behavioral: Negative.         Objective:    Physical Exam   Constitutional: She is oriented to person, place, and time. She appears well-developed and well-nourished.   HENT:   Head: Normocephalic and atraumatic.   Eyes: Conjunctivae and EOM are normal. Pupils are equal, round, and reactive to light.   Neck: Normal range of motion. Neck supple. No thyromegaly present.   Cardiovascular: Normal rate and regular rhythm.    No murmur heard.  Pulmonary/Chest: Effort normal and breath sounds normal. No respiratory distress.   Abdominal: Soft. Bowel sounds are normal.   Musculoskeletal: She exhibits no edema.    Neurological: She is alert and oriented to person, place, and time.   Skin: Skin is warm and dry.   Psychiatric: She has a normal mood and affect. Her behavior is normal.       R/LHC:  B. Summary/Post-Operative Diagnosis       Normal coronary arteries.    Systolic dysfunction.    Moderately elevated right and left Filling Pressures.    Mild to moderate Pulmonary Hypertension.    Ejection Fraction: 15%  Global LV Function: severely depressed    Air rest:  PW:  (33)  PA: 46  CO_THERM: 4.88  RV: 50  RA:  (23)  LVEDP: 27       E. Angiographic Results     Diagnostic:          Patient has a right dominant coronary artery.        - Left Main Coronary Artery:             The LM is normal. There is CHAD 3 flow.     - Left Anterior Descending Artery:             The LAD is normal. There is CHAD 3 flow.     - Left Circumflex Artery:             The LCX is normal. There is CHAD 3 flow.     - Right Coronary Artery:             The RCA is normal. There is CHAD 3 flow.     - Common Femoral Artery:             The right CFA is normal.    Assessment:       1. Acute systolic congestive heart failure    2. Essential (primary) hypertension    3. Dyslipidemia    4. Controlled type 2 diabetes mellitus with diabetic nephropathy, without long-term current use of insulin    5. Tobacco abuse    6. LBBB (left bundle branch block)         Plan:       -cont med tx, increase lasix 40 bid  -check labs in 2 weeks  -refer to cardiac rehab  -recheck echo 3 mos, consdier BIVE  -work release no restrictions    RTC 1 mo

## 2017-04-17 NOTE — MR AVS SNAPSHOT
[facility]  [address] [facility] [place] [address]  Phone: [phone]                  Hannah Montoya   2017 1:20 PM   Office Visit    Description:  Female : 1955   Provider:  Kevin Lopez MD   Department:  [facility]           Reason for Visit     Post-op Evaluation           Diagnoses this Visit        Comments    Acute systolic congestive heart failure    -  Primary     Essential (primary) hypertension         Dyslipidemia         Controlled type 2 diabetes mellitus with diabetic nephropathy, without long-term current use of insulin         Tobacco abuse         LBBB (left bundle branch block)                To Do List           Goals (5 Years of Data)     None      [facility]     [facility] On Call Nurse Care Line -  Assistance  Unless otherwise directed by your provider, please contact [facility], our nurse care line that is available for  assistance.     Registered nurses in the [facility] provide: appointment scheduling, clinical advisement, health education, and other advisory services.  Call: [phone] (toll free)               Medications           Message regarding Medications     Verify the changes and/or additions to your medication regime listed below are the same as discussed with your clinician today.  If any of these changes or additions are incorrect, please notify your healthcare provider.        STOP taking these medications     albuterol-ipratropium 2.5mg-0.5mg/3mL (DUO-NEB) 0.5 mg-3 mg(2.5 mg base)/3 mL nebulizer solution            Verify that the below list of medications is an accurate representation of the medications you are currently taking.  If none reported, the list may be blank. If incorrect, please contact your healthcare provider. Carry this list with you in case of emergency.           Current Medications     acetaminophen (TYLENOL) 325 MG tablet Take 650 mg by mouth every 6 (six) hours as  "needed for Pain.    albuterol (ACCUNEB) 1.25 mg/3 mL Nebu Take 1.25 mg by nebulization every 6 (six) hours. Rescue    albuterol (PROAIR HFA) 90 mcg/actuation inhaler Inhale 2 puffs into the lungs every 6 (six) hours as needed. Rescue    alprazolam (XANAX) 0.25 MG tablet TAKE 1 TABLET(0.25 MG) BY MOUTH DAILY AS NEEDED FOR ANXIETY    budesonide (PULMICORT FLEXHALER) 90 mcg/actuation AePB Inhale 2 puffs (180 mcg total) into the lungs 2 (two) times daily.    carvedilol (COREG) 6.25 MG tablet Take 4 tablets (25 mg total) by mouth 2 (two) times daily with meals.    fluticasone (FLONASE) 50 mcg/actuation nasal spray 1 spray by Each Nare route 2 (two) times daily.    furosemide (LASIX) 20 MG tablet Take 2 tablets (40 mg total) by mouth once daily.    lisinopril-hydrochlorothiazide (PRINZIDE,ZESTORETIC) 20-12.5 mg per tablet Take 2 tablets by mouth once daily.    metformin (GLUCOPHAGE) 500 MG tablet Take 1 tablet (500 mg total) by mouth 2 (two) times daily.    sertraline (ZOLOFT) 100 MG tablet Take 1 tablet (100 mg total) by mouth every evening.    STIOLTO RESPIMAT 2.5-2.5 mcg/actuation Mist INHALE 2 PUFFS BY MOUTH DAILY    TRUETEST TEST STRIPS Strp test once EVERY MORNING           Clinical Reference Information           Your Vitals Were     BP Pulse Height Weight Last Period SpO2    113/68 (BP Location: Left arm, Patient Position: Sitting, BP Method: Automatic) 89 5' 2" (1.575 m) 85.4 kg (188 lb 4.4 oz) (LMP Unknown) 97%    BMI                34.44 kg/m2          Blood Pressure          Most Recent Value    BP  113/68      Allergies as of 4/17/2017     No Known Allergies      Immunizations Administered on Date of Encounter - 4/17/2017     None      Language Assistance Services     ATTENTION: Language assistance services are available, free of charge. Please call [phone].      ATENCIÓN: Si habla kierra, tiene a terry disposición servicios gratuitos de asistencia lingüística. Llame al [phone].     ADAMARIS Ý: N?u b?n " nói Ti?ng Vi?t, có các d?ch v? h? tr? ngôn ng? mi?n phí dành cho b?n. G?i s? [phone].         [facility] complies with applicable Federal civil rights laws and does not discriminate on the basis of race, color, national origin, age, disability, or sex.

## 2017-04-21 RX ORDER — CARVEDILOL 6.25 MG/1
6.25 TABLET ORAL 2 TIMES DAILY WITH MEALS
Qty: 60 TABLET | Refills: 0 | Status: SHIPPED | OUTPATIENT
Start: 2017-04-21 | End: 2017-05-22 | Stop reason: SDUPTHER

## 2017-04-26 ENCOUNTER — PATIENT MESSAGE (OUTPATIENT)
Dept: FAMILY MEDICINE | Facility: CLINIC | Age: 62
End: 2017-04-26

## 2017-04-27 ENCOUNTER — OFFICE VISIT (OUTPATIENT)
Dept: FAMILY MEDICINE | Facility: CLINIC | Age: 62
End: 2017-04-27
Payer: COMMERCIAL

## 2017-04-27 VITALS
RESPIRATION RATE: 20 BRPM | TEMPERATURE: 98 F | OXYGEN SATURATION: 95 % | WEIGHT: 185.19 LBS | SYSTOLIC BLOOD PRESSURE: 116 MMHG | BODY MASS INDEX: 34.08 KG/M2 | HEART RATE: 99 BPM | DIASTOLIC BLOOD PRESSURE: 84 MMHG | HEIGHT: 62 IN

## 2017-04-27 DIAGNOSIS — M62.830 BACK MUSCLE SPASM: Primary | ICD-10-CM

## 2017-04-27 PROCEDURE — 1160F RVW MEDS BY RX/DR IN RCRD: CPT | Mod: S$GLB,,, | Performed by: FAMILY MEDICINE

## 2017-04-27 PROCEDURE — 3074F SYST BP LT 130 MM HG: CPT | Mod: S$GLB,,, | Performed by: FAMILY MEDICINE

## 2017-04-27 PROCEDURE — 99214 OFFICE O/P EST MOD 30 MIN: CPT | Mod: S$GLB,,, | Performed by: FAMILY MEDICINE

## 2017-04-27 PROCEDURE — 3079F DIAST BP 80-89 MM HG: CPT | Mod: S$GLB,,, | Performed by: FAMILY MEDICINE

## 2017-04-27 PROCEDURE — 99999 PR PBB SHADOW E&M-EST. PATIENT-LVL III: CPT | Mod: PBBFAC,,, | Performed by: FAMILY MEDICINE

## 2017-04-27 RX ORDER — METHYLPREDNISOLONE 4 MG/1
TABLET ORAL
Qty: 1 PACKAGE | Refills: 0 | Status: SHIPPED | OUTPATIENT
Start: 2017-04-27 | End: 2017-05-17

## 2017-04-27 RX ORDER — TIZANIDINE 4 MG/1
4 TABLET ORAL EVERY 6 HOURS PRN
Qty: 30 TABLET | Refills: 0 | Status: SHIPPED | OUTPATIENT
Start: 2017-04-27 | End: 2017-05-07

## 2017-04-27 NOTE — MEDICAL/APP STUDENT
Subjective:       Patient ID: Hannah Montoya is a 61 y.o. female.    Chief Complaint: Sciatica (left)  Ms. Montoya is a 61 yo lady w/ pmhx of CHF, COPD, DMII, presents today w/ c/o lower left back pain. Pain started 3 days ago, pain comes and goes, she rates it as 10/10 in intensity, describes the pain as sharp, and occasionally radiates down to the knee. She tried 1/2 tablet hydrocodone which helped. NSAIDS Contraindicated due to CHF. Breathing has also improved.     Patient denies: trauma, fever, visual changes, headaches, chest pain, sob,  symptoms, Abdominal symptoms, skin changes, Numbness, Tingling.    Patient endorses : nausea 2/2 to the pain.         Past Medical History:   Diagnosis Date    Asthma     bronchitis in past    Breast cancer     COPD (chronic obstructive pulmonary disease)     Diabetes mellitus, type 2     Hyperglycemia     Hyperlipidemia      Past Surgical History:   Procedure Laterality Date    [facility] BIOPSY Right 2016    [facility]    BREAST LUMPECTOMY Right     BREAST SURGERY       SECTION      x2    CHOLECYSTECTOMY      MASTECTOMY W/ SENTINEL NODE BIOPSY      TUBAL LIGATION       Family History   Problem Relation Age of Onset    Kidney disease Mother     Kidney disease Father     Clotting disorder Brother     Breast cancer Neg Hx     Ovarian cancer Neg Hx      Social History     Social History    Marital status:      Spouse name: N/A    Number of children: N/A    Years of education: N/A     Occupational History     [employer]     Social History Main Topics    Smoking status: Former Smoker     Packs/day: 0.50     Years: 40.00     Quit date: 2016    Smokeless tobacco: None    Alcohol use 0.0 oz/week     0 Standard drinks or equivalent per week      Comment: rare- holiday    Drug use: No    Sexual activity: Not Asked     Other Topics Concern    None     Social History Narrative       Current Outpatient Prescriptions   Medication Sig  Dispense Refill    acetaminophen (TYLENOL) 325 MG tablet Take 650 mg by mouth every 6 (six) hours as needed for Pain.      albuterol (ACCUNEB) 1.25 mg/3 mL Nebu Take 1.25 mg by nebulization every 6 (six) hours. Rescue      albuterol (PROAIR HFA) 90 mcg/actuation inhaler Inhale 2 puffs into the lungs every 6 (six) hours as needed. Rescue 17 g 5    alprazolam (XANAX) 0.25 MG tablet TAKE 1 TABLET(0.25 MG) BY MOUTH DAILY AS NEEDED FOR ANXIETY 20 tablet 0    budesonide (PULMICORT FLEXHALER) 90 mcg/actuation AePB Inhale 2 puffs (180 mcg total) into the lungs 2 (two) times daily. 1 each 6    carvedilol (COREG) 6.25 MG tablet Take 1 tablet (6.25 mg total) by mouth 2 (two) times daily with meals. 60 tablet 0    fluticasone (FLONASE) 50 mcg/actuation nasal spray 1 spray by Each Nare route 2 (two) times daily. 1 Bottle 4    furosemide (LASIX) 40 MG tablet Take 1 tablet (40 mg total) by mouth 2 (two) times daily. 60 tablet 3    metformin (GLUCOPHAGE) 500 MG tablet Take 1 tablet (500 mg total) by mouth 2 (two) times daily. 60 tablet 2    sertraline (ZOLOFT) 100 MG tablet Take 1 tablet (100 mg total) by mouth every evening. 90 tablet 1    STIOLTO RESPIMAT 2.5-2.5 mcg/actuation Mist INHALE 2 PUFFS BY MOUTH DAILY 4 g 0    TRUETEST TEST STRIPS Strp test once EVERY MORNING 90 each 3    methylPREDNISolone (MEDROL DOSEPACK) 4 mg tablet use as directed 1 Package 0    tizanidine (ZANAFLEX) 4 MG tablet Take 1 tablet (4 mg total) by mouth every 6 (six) hours as needed. 30 tablet 0     No current facility-administered medications for this visit.      Review of patient's allergies indicates:  No Known Allergies    Review of Systems   Constitutional: Negative for appetite change, chills, diaphoresis, fatigue, fever and unexpected weight change.   HENT: Negative.    Eyes: Negative.    Respiratory: Negative for chest tightness, shortness of breath and wheezing.    Cardiovascular: Positive for leg swelling. Negative for chest pain  "and palpitations.   Gastrointestinal: Positive for nausea. Negative for abdominal pain, constipation, diarrhea and vomiting.   Endocrine: Negative.    Genitourinary: Negative for decreased urine volume, difficulty urinating, dyspareunia, dysuria, flank pain and hematuria.   Musculoskeletal: Positive for back pain.   Allergic/Immunologic: Negative.    Neurological: Negative for dizziness, syncope, weakness, numbness and headaches.   Hematological: Negative.    Psychiatric/Behavioral: Negative.        Objective:      Vitals:    04/27/17 0746   BP: 116/84   BP Location: Left arm   Patient Position: Sitting   BP Method: Manual   Pulse: 99   Resp: 20   Temp: 98.3 °F (36.8 °C)   TempSrc: Oral   SpO2: 95%   Weight: 84 kg (185 lb 3 oz)   Height: 5' 2" (1.575 m)     Physical Exam   Constitutional: She is oriented to person, place, and time. She appears well-developed and well-nourished.   HENT:   Head: Normocephalic and atraumatic.   Eyes: Conjunctivae and EOM are normal. Pupils are equal, round, and reactive to light.   Cardiovascular: Normal rate.    Murmur heard.   Diastolic murmur is present   Murmur at pulmonic valve?   Pulmonary/Chest: Effort normal and breath sounds normal. No accessory muscle usage. No respiratory distress. She exhibits no tenderness.   Abdominal: Soft. Bowel sounds are normal.   Musculoskeletal: She exhibits tenderness.        Right hip: Normal.        Left hip: She exhibits decreased range of motion and decreased strength.   Neurological: She is alert and oriented to person, place, and time. No sensory deficit. She displays a negative Romberg sign.   Reflex Scores:       Patellar reflexes are 2+ on the right side and 2+ on the left side.  Skin: Skin is warm and dry.   ###Straight leg raise- Negative Bilaterally, Left back pain reproducible on palpation.          Assessment:       1. Back muscle spasm        Plan:       # Back Muscle Spasm  -MethylPrednisolone- PE  -Zanaflex- 4mg     Alexa " Zaid  UQ-OCS, MS4

## 2017-04-27 NOTE — MR AVS SNAPSHOT
[facility]  [address] [facility]  [place] [address]  Phone: [phone]                  Hannah Montoya   2017 7:45 AM   Office Visit    Description:  Female : 1955   Provider:  Emanuel Chavez MD   Department:  [facility]           Reason for Visit     Sciatica           Diagnoses this Visit        Comments    Back muscle spasm    -  Primary            To Do List           Future Appointments        Provider Department Dept Phone    5/3/2017 4:00 PM [facility] [phone]    2017 2:20 PM Kevin Lopez MD [facility] [phone]      Goals (5 Years of Data)     None      Follow-Up and Disposition     Return in about 3 months (around 2017), or if symptoms worsen or fail to improve.       These Medications        Disp Refills Start End    methylPREDNISolone (MEDROL DOSEPACK) 4 mg tablet 1 Package 0 2017     use as directed    Pharmacy: [facility] [address] [place] [address] AT [facility] #: [phone]       tizanidine (ZANAFLEX) 4 MG tablet 30 tablet 0 2017    Take 1 tablet (4 mg total) by mouth every 6 (six) hours as needed. - Oral    Pharmacy: [facility] [address] [place] [address] [address] [facility] AT [facility] Ph #: [phone]         [facility]     [facility] Nurse Care Line - 24/ Assistance  Unless otherwise directed by your provider, please contact [facility], our nurse care line that is available for 24/ assistance.     Registered nurses in the [facility] provide: appointment scheduling, clinical advisement, health education, and other advisory services.  Call: [phone] (toll free)               Medications           Message regarding Medications     Verify the changes and/or additions to your medication regime listed below are the same as discussed with your clinician today.   If any of these changes or additions are incorrect, please notify your healthcare provider.        START taking these NEW medications        Refills    methylPREDNISolone (MEDROL DOSEPACK) 4 mg tablet 0    Sig: use as directed    Class: Normal    tizanidine (ZANAFLEX) 4 MG tablet 0    Sig: Take 1 tablet (4 mg total) by mouth every 6 (six) hours as needed.    Class: Normal    Route: Oral      STOP taking these medications     lisinopril-hydrochlorothiazide (PRINZIDE,ZESTORETIC) 20-12.5 mg per tablet Take 2 tablets by mouth once daily.           Verify that the below list of medications is an accurate representation of the medications you are currently taking.  If none reported, the list may be blank. If incorrect, please contact your healthcare provider. Carry this list with you in case of emergency.           Current Medications     acetaminophen (TYLENOL) 325 MG tablet Take 650 mg by mouth every 6 (six) hours as needed for Pain.    albuterol (ACCUNEB) 1.25 mg/3 mL Nebu Take 1.25 mg by nebulization every 6 (six) hours. Rescue    albuterol (PROAIR HFA) 90 mcg/actuation inhaler Inhale 2 puffs into the lungs every 6 (six) hours as needed. Rescue    alprazolam (XANAX) 0.25 MG tablet TAKE 1 TABLET(0.25 MG) BY MOUTH DAILY AS NEEDED FOR ANXIETY    budesonide (PULMICORT FLEXHALER) 90 mcg/actuation AePB Inhale 2 puffs (180 mcg total) into the lungs 2 (two) times daily.    carvedilol (COREG) 6.25 MG tablet Take 1 tablet (6.25 mg total) by mouth 2 (two) times daily with meals.    fluticasone (FLONASE) 50 mcg/actuation nasal spray 1 spray by Each Nare route 2 (two) times daily.    furosemide (LASIX) 40 MG tablet Take 1 tablet (40 mg total) by mouth 2 (two) times daily.    metformin (GLUCOPHAGE) 500 MG tablet Take 1 tablet (500 mg total) by mouth 2 (two) times daily.    sertraline (ZOLOFT) 100 MG tablet Take 1 tablet (100 mg total) by mouth every evening.    STIOLTO RESPIMAT 2.5-2.5 mcg/actuation Mist INHALE 2 PUFFS BY MOUTH  "DAILY    TRUETEST TEST STRIPS Strp test once EVERY MORNING    methylPREDNISolone (MEDROL DOSEPACK) 4 mg tablet use as directed    tizanidine (ZANAFLEX) 4 MG tablet Take 1 tablet (4 mg total) by mouth every 6 (six) hours as needed.           Clinical Reference Information           Your Vitals Were     BP Pulse Temp Resp Height Weight    116/84 (BP Location: Left arm, Patient Position: Sitting, BP Method: Manual) 99 98.3 °F (36.8 °C) (Oral) 20 5' 2" (1.575 m) 84 kg (185 lb 3 oz)    Last Period SpO2 BMI          (LMP Unknown) 95% 33.87 kg/m2        Blood Pressure          Most Recent Value    BP  116/84      Allergies as of 4/27/2017     No Known Allergies      Immunizations Administered on Date of Encounter - 4/27/2017     None      Language Assistance Services     ATTENTION: Language assistance services are available, free of charge. Please call [phone].      ATENCIÓN: Si habla español, tiene a terry disposición servicios gratuitos de asistencia lingüística. Llame al [phone].     ADAMARIS Ý: N?u b?n nói Ti?ng Vi?t, có các d?ch v? h? tr? ngôn ng? mi?n phí dành cho b?n. G?i s? [phone].         [facility] complies with applicable Federal civil rights laws and does not discriminate on the basis of race, color, national origin, age, disability, or sex.        "

## 2017-04-27 NOTE — PROGRESS NOTES
Routine Office Visit    Patient Name: Hannah Montoya    : 1955  MRN: [id number]    Subjective:  Hannah is a 61 y.o. female who presents today for:    1. Back pain  Patient presenting with 2 days of left lower back pain that is worsened with laying down and standing, but improved with sitting.  The pain is cramping in nature.  She denies any heavy lifting.  The pain occasionally radiates down the side of her left leg.  There has been no weakness, incontinence, or numbness.  She has had sciatica in the past and thought this could be a flare of it.      Past Medical History  Past Medical History:   Diagnosis Date    Asthma     bronchitis in past    Breast cancer     COPD (chronic obstructive pulmonary disease)     Diabetes mellitus, type 2     Hyperglycemia     Hyperlipidemia        Past Surgical History  Past Surgical History:   Procedure Laterality Date    [facility] Right 2016    [facility]    BREAST LUMPECTOMY Right     BREAST SURGERY       SECTION      x2    CHOLECYSTECTOMY      MASTECTOMY W/ SENTINEL NODE BIOPSY      TUBAL LIGATION         Family History  Family History   Problem Relation Age of Onset    Kidney disease Mother     Kidney disease Father     Clotting disorder Brother     Breast cancer Neg Hx     Ovarian cancer Neg Hx        Social History  Social History     Social History    Marital status:      Spouse name: N/A    Number of children: N/A    Years of education: N/A     Occupational History     [email or web address]     Social History Main Topics    Smoking status: Former Smoker     Packs/day: 0.50     Years: 40.00     Quit date: 2016    Smokeless tobacco: Not on file    Alcohol use 0.0 oz/week     0 Standard drinks or equivalent per week      Comment: rare- holiday    Drug use: No    Sexual activity: Not on file     Other Topics Concern    Not on file     Social History Narrative       Current Medications  Current Outpatient Prescriptions on File  Prior to Visit   Medication Sig Dispense Refill    acetaminophen (TYLENOL) 325 MG tablet Take 650 mg by mouth every 6 (six) hours as needed for Pain.      albuterol (ACCUNEB) 1.25 mg/3 mL Nebu Take 1.25 mg by nebulization every 6 (six) hours. Rescue      albuterol (PROAIR HFA) 90 mcg/actuation inhaler Inhale 2 puffs into the lungs every 6 (six) hours as needed. Rescue 17 g 5    alprazolam (XANAX) 0.25 MG tablet TAKE 1 TABLET(0.25 MG) BY MOUTH DAILY AS NEEDED FOR ANXIETY 20 tablet 0    budesonide (PULMICORT FLEXHALER) 90 mcg/actuation AePB Inhale 2 puffs (180 mcg total) into the lungs 2 (two) times daily. 1 each 6    carvedilol (COREG) 6.25 MG tablet Take 1 tablet (6.25 mg total) by mouth 2 (two) times daily with meals. 60 tablet 0    fluticasone (FLONASE) 50 mcg/actuation nasal spray 1 spray by Each Nare route 2 (two) times daily. 1 Bottle 4    furosemide (LASIX) 40 MG tablet Take 1 tablet (40 mg total) by mouth 2 (two) times daily. 60 tablet 3    metformin (GLUCOPHAGE) 500 MG tablet Take 1 tablet (500 mg total) by mouth 2 (two) times daily. 60 tablet 2    sertraline (ZOLOFT) 100 MG tablet Take 1 tablet (100 mg total) by mouth every evening. 90 tablet 1    STIOLTO RESPIMAT 2.5-2.5 mcg/actuation Mist INHALE 2 PUFFS BY MOUTH DAILY 4 g 0    TRUETEST TEST STRIPS Strp test once EVERY MORNING 90 each 3    [DISCONTINUED] lisinopril-hydrochlorothiazide (PRINZIDE,ZESTORETIC) 20-12.5 mg per tablet Take 2 tablets by mouth once daily. 60 tablet 2     No current facility-administered medications on file prior to visit.        Allergies   Review of patient's allergies indicates:  No Known Allergies    Review of Systems (Pertinent positives)  Review of Systems   Constitutional: Negative.    HENT: Negative.    Eyes: Negative.    Respiratory: Negative.    Cardiovascular: Negative.    Gastrointestinal: Negative.    Genitourinary: Negative.    Musculoskeletal: Positive for back pain and myalgias.   Skin: Negative.   "        /84 (BP Location: Left arm, Patient Position: Sitting, BP Method: Manual)  Pulse 99  Temp 98.3 °F (36.8 °C) (Oral)   Resp 20  Ht 5' 2" (1.575 m)  Wt 84 kg (185 lb 3 oz)  LMP  (LMP Unknown)  SpO2 95%  BMI 33.87 kg/m2    GENERAL APPEARANCE: in no apparent distress and well developed and well nourished  HEENT: PERRL, EOMI, Sclera clear, anicteric, Oropharynx clear, no lesions, Neck supple with midline trachea  NECK: normal, supple, no adenopathy, thyroid normal in size  RESPIRATORY: appears well, vitals normal, no respiratory distress, acyanotic, normal RR, chest clear, no wheezing, crepitations, rhonchi, normal symmetric air entry  HEART: regular rate and rhythm, S1, S2 normal, no murmur, click, rub or gallop.    ABDOMEN: abdomen is soft without tenderness, no masses, no hernias, no organomegaly, no rebound, no guarding. Suprapubic tenderness absent. No CVA tenderness.  NEUROLOGIC: normal without focal findings, CN II-XII are intact.  reflexes 2 for biceps, brachial, patellar and achilles bilateral and symmetric, sensation grossly normal, antalgic gait.  SKIN: no rashes, no wounds, no other lesions  PSYCH: Alert, oriented x 3, thought content appropriate, speech normal, pleasant and cooperative, good eye contact, well groomed    Assessment/Plan:  Hannah Montoya is a 61 y.o. female who presents today for :    Hannah was seen today for sciatica.    Diagnoses and all orders for this visit:    Back muscle spasm  -     methylPREDNISolone (MEDROL DOSEPACK) 4 mg tablet; use as directed  -     tizanidine (ZANAFLEX) 4 MG tablet; Take 1 tablet (4 mg total) by mouth every 6 (six) hours as needed.      1.  No JETT-2 inhibitors due to CHF  2.  Steroid and muscle relaxor for symptom relief  3.  Follow up as needed  4.  She is to call with any concerns      Emanuel Chavez MD    "

## 2017-05-03 ENCOUNTER — LAB VISIT (OUTPATIENT)
Dept: LAB | Facility: HOSPITAL | Age: 62
End: 2017-05-03
Attending: INTERNAL MEDICINE
Payer: COMMERCIAL

## 2017-05-03 ENCOUNTER — PATIENT MESSAGE (OUTPATIENT)
Dept: CARDIOLOGY | Facility: CLINIC | Age: 62
End: 2017-05-03

## 2017-05-03 DIAGNOSIS — I50.21 ACUTE SYSTOLIC CONGESTIVE HEART FAILURE: ICD-10-CM

## 2017-05-03 LAB
ANION GAP SERPL CALC-SCNC: 11 MMOL/L
BUN SERPL-MCNC: 19 MG/DL
CALCIUM SERPL-MCNC: 10.2 MG/DL
CHLORIDE SERPL-SCNC: 99 MMOL/L
CO2 SERPL-SCNC: 31 MMOL/L
CREAT SERPL-MCNC: 1 MG/DL
EST. GFR  (AFRICAN AMERICAN): >60 ML/MIN/1.73 M^2
EST. GFR  (NON AFRICAN AMERICAN): >60 ML/MIN/1.73 M^2
GLUCOSE SERPL-MCNC: 120 MG/DL
MAGNESIUM SERPL-MCNC: 1.7 MG/DL
POTASSIUM SERPL-SCNC: 3.8 MMOL/L
SODIUM SERPL-SCNC: 141 MMOL/L

## 2017-05-03 PROCEDURE — 36415 COLL VENOUS BLD VENIPUNCTURE: CPT

## 2017-05-03 PROCEDURE — 80048 BASIC METABOLIC PNL TOTAL CA: CPT

## 2017-05-03 PROCEDURE — 83735 ASSAY OF MAGNESIUM: CPT

## 2017-05-17 ENCOUNTER — OFFICE VISIT (OUTPATIENT)
Dept: CARDIOLOGY | Facility: CLINIC | Age: 62
End: 2017-05-17
Payer: COMMERCIAL

## 2017-05-17 ENCOUNTER — TELEPHONE (OUTPATIENT)
Dept: FAMILY MEDICINE | Facility: CLINIC | Age: 62
End: 2017-05-17

## 2017-05-17 VITALS
BODY MASS INDEX: 32.05 KG/M2 | DIASTOLIC BLOOD PRESSURE: 82 MMHG | HEIGHT: 62 IN | OXYGEN SATURATION: 95 % | HEART RATE: 93 BPM | SYSTOLIC BLOOD PRESSURE: 122 MMHG | WEIGHT: 174.19 LBS

## 2017-05-17 DIAGNOSIS — E78.5 DYSLIPIDEMIA: ICD-10-CM

## 2017-05-17 DIAGNOSIS — E11.21 CONTROLLED TYPE 2 DIABETES MELLITUS WITH DIABETIC NEPHROPATHY, WITHOUT LONG-TERM CURRENT USE OF INSULIN: ICD-10-CM

## 2017-05-17 DIAGNOSIS — I44.7 LBBB (LEFT BUNDLE BRANCH BLOCK): ICD-10-CM

## 2017-05-17 DIAGNOSIS — J30.2 SEASONAL ALLERGIC RHINITIS, UNSPECIFIED ALLERGIC RHINITIS TRIGGER: Primary | ICD-10-CM

## 2017-05-17 DIAGNOSIS — I50.21 ACUTE SYSTOLIC CONGESTIVE HEART FAILURE: Primary | ICD-10-CM

## 2017-05-17 DIAGNOSIS — I10 ESSENTIAL (PRIMARY) HYPERTENSION: ICD-10-CM

## 2017-05-17 DIAGNOSIS — Z72.0 TOBACCO ABUSE: ICD-10-CM

## 2017-05-17 PROCEDURE — 99999 PR PBB SHADOW E&M-EST. PATIENT-LVL III: CPT | Mod: PBBFAC,,, | Performed by: INTERNAL MEDICINE

## 2017-05-17 PROCEDURE — 1160F RVW MEDS BY RX/DR IN RCRD: CPT | Mod: S$GLB,,, | Performed by: INTERNAL MEDICINE

## 2017-05-17 PROCEDURE — 3060F POS MICROALBUMINURIA REV: CPT | Mod: 8P,S$GLB,, | Performed by: INTERNAL MEDICINE

## 2017-05-17 PROCEDURE — 3044F HG A1C LEVEL LT 7.0%: CPT | Mod: S$GLB,,, | Performed by: INTERNAL MEDICINE

## 2017-05-17 PROCEDURE — 3079F DIAST BP 80-89 MM HG: CPT | Mod: S$GLB,,, | Performed by: INTERNAL MEDICINE

## 2017-05-17 PROCEDURE — 99214 OFFICE O/P EST MOD 30 MIN: CPT | Mod: S$GLB,,, | Performed by: INTERNAL MEDICINE

## 2017-05-17 PROCEDURE — 3074F SYST BP LT 130 MM HG: CPT | Mod: S$GLB,,, | Performed by: INTERNAL MEDICINE

## 2017-05-17 RX ORDER — MOMETASONE FUROATE 50 UG/1
2 SPRAY, METERED NASAL DAILY
Qty: 17 G | Refills: 3 | Status: SHIPPED | OUTPATIENT
Start: 2017-05-17 | End: 2017-08-09

## 2017-05-17 NOTE — PROGRESS NOTES
Subjective:    Patient ID:  Hannah Montoya is a 61 y.o. female who presents for follow-up of No chief complaint on file.      HPI   previous history:  Patient is here for follow-up of newly diagnosed systolic heart failure.  She underwent right and left heart catheterization.  This showed no significant coronary artery disease mildly elevated filling pressures as below.  She denies any issues since catheterization.  She does have some mild lower extremity swelling.  She denies any chest pain, shortness of breath or palpitations.  She's expands no PND or orthopnea.  She is not expressing dizziness, presyncope or syncope.  We again discussed secondhand tobacco exposure.  Otherwise she's better usual state of health.  She's tolerating her medicines after adjustment.  Her blood pressure heart rate are stable today.    Today:  Here for follow-up of systolic heart failure.  She's been looking good and feeling good.  She's been exercising at cardiac rehabilitation.  She plans to do it on her own afterward.  She's adjusted her diet.  She denies any chest pain, shortness of breath or palpitations.  She's expands no PND, orthopnea or lower edema.  She's denies any dizziness, presyncope or syncope.  She's lost over 20 pounds in the last couple months since we initially met and she was diagnosed with cardiomyopathy.      Review of Systems   Constitution: Negative.   HENT: Negative.    Eyes: Negative.    Cardiovascular: Negative for chest pain, dyspnea on exertion, irregular heartbeat, leg swelling, near-syncope, orthopnea, palpitations, paroxysmal nocturnal dyspnea and syncope.   Respiratory: Negative for shortness of breath.    Skin: Negative.    Musculoskeletal: Negative.    Gastrointestinal: Negative for abdominal pain, constipation and diarrhea.   Genitourinary: Negative for dysuria.   Neurological: Negative.    Psychiatric/Behavioral: Negative.         Objective:    Physical Exam   Constitutional: She is oriented  to person, place, and time. She appears well-developed and well-nourished.   HENT:   Head: Normocephalic and atraumatic.   Eyes: Conjunctivae and EOM are normal. Pupils are equal, round, and reactive to light.   Neck: Normal range of motion. Neck supple. No thyromegaly present.   Cardiovascular: Normal rate and regular rhythm.    No murmur heard.  Pulmonary/Chest: Effort normal and breath sounds normal. No respiratory distress.   Abdominal: Soft. Bowel sounds are normal.   Musculoskeletal: She exhibits no edema.   Neurological: She is alert and oriented to person, place, and time.   Skin: Skin is warm and dry.   Psychiatric: She has a normal mood and affect. Her behavior is normal.       R/LHC:  B. Summary/Post-Operative Diagnosis       Normal coronary arteries.    Systolic dysfunction.    Moderately elevated right and left Filling Pressures.    Mild to moderate Pulmonary Hypertension.    Ejection Fraction: 15%  Global LV Function: severely depressed    Air rest:  PW:  (33)  PA: 46  CO_THERM: 4.88  RV: 50  RA:  (23)  LVEDP: 27       E. Angiographic Results     Diagnostic:          Patient has a right dominant coronary artery.        - Left Main Coronary Artery:             The LM is normal. There is CHAD 3 flow.     - Left Anterior Descending Artery:             The LAD is normal. There is CHAD 3 flow.     - Left Circumflex Artery:             The LCX is normal. There is CHAD 3 flow.     - Right Coronary Artery:             The RCA is normal. There is CHAD 3 flow.     - Common Femoral Artery:             The right CFA is normal.    Mag-1.7  Serum creatinine within normal limits  Potassium within normal limits    Assessment:       1. Acute systolic congestive heart failure    2. Essential (primary) hypertension    3. Dyslipidemia    4. Controlled type 2 diabetes mellitus with diabetic nephropathy, without long-term current use of insulin    5. Tobacco abuse    6. LBBB (left bundle branch block)         Plan:        -cont med tx  -eat more greens and fruits  -refer to cardiac rehab  -recheck echo prior to next visit, consdier BIVE  -work release no restrictions    RTC 2 mo w/ echo

## 2017-05-17 NOTE — MR AVS SNAPSHOT
[facility] Cardiology  [address] [facility] [address] [place] [address]  Phone: [phone]                  Hannah Montoya   2017 2:20 PM   Office Visit    Description:  Female : 1955   Provider:  Kevin Lopez MD   Department:  [facility] Cardiology           Reason for Visit     Congestive Heart Failure           Diagnoses this Visit        Comments    Acute systolic congestive heart failure    -  Primary     Essential (primary) hypertension         Dyslipidemia         Controlled type 2 diabetes mellitus with diabetic nephropathy, without long-term current use of insulin         Tobacco abuse         LBBB (left bundle branch block)                To Do List           Future Appointments        Provider Department Dept Phone    2017 2:20 PM Kevin Lopez MD [facility] [phone]      Goals (5 Years of Data)     None      [facility]     [facility] -  Assistance  Unless otherwise directed by your provider, please contact [facility], our nurse care line that is available for  assistance.     Registered nurses in the [facility] provide: appointment scheduling, clinical advisement, health education, and other advisory services.  Call: [phone] (toll free)               Medications           Message regarding Medications     Verify the changes and/or additions to your medication regime listed below are the same as discussed with your clinician today.  If any of these changes or additions are incorrect, please notify your healthcare provider.        STOP taking these medications     methylPREDNISolone (MEDROL DOSEPACK) 4 mg tablet use as directed           Verify that the below list of medications is an accurate representation of the medications you are currently taking.  If none reported, the list may be blank. If incorrect, please contact your healthcare provider. Carry this list with you in case of emergency.     "       Current Medications     acetaminophen (TYLENOL) 325 MG tablet Take 650 mg by mouth every 6 (six) hours as needed for Pain.    albuterol (ACCUNEB) 1.25 mg/3 mL Nebu Take 1.25 mg by nebulization every 6 (six) hours. Rescue    albuterol (PROAIR HFA) 90 mcg/actuation inhaler Inhale 2 puffs into the lungs every 6 (six) hours as needed. Rescue    alprazolam (XANAX) 0.25 MG tablet TAKE 1 TABLET(0.25 MG) BY MOUTH DAILY AS NEEDED FOR ANXIETY    budesonide (PULMICORT FLEXHALER) 90 mcg/actuation AePB Inhale 2 puffs (180 mcg total) into the lungs 2 (two) times daily.    carvedilol (COREG) 6.25 MG tablet Take 1 tablet (6.25 mg total) by mouth 2 (two) times daily with meals.    furosemide (LASIX) 40 MG tablet Take 1 tablet (40 mg total) by mouth 2 (two) times daily.    metformin (GLUCOPHAGE) 500 MG tablet Take 1 tablet (500 mg total) by mouth 2 (two) times daily.    mometasone (NASONEX) 50 mcg/actuation nasal spray 2 sprays by Nasal route once daily.    sertraline (ZOLOFT) 100 MG tablet Take 1 tablet (100 mg total) by mouth every evening.    STIOLTO RESPIMAT 2.5-2.5 mcg/actuation Mist INHALE 2 PUFFS BY MOUTH DAILY    TRUETEST TEST STRIPS Strp test once EVERY MORNING           Clinical Reference Information           Your Vitals Were     BP Pulse Height Weight Last Period SpO2    122/82 (BP Location: Right arm, Patient Position: Sitting, BP Method: Automatic) 93 5' 2" (1.575 m) 79 kg (174 lb 3.2 oz) (LMP Unknown) 95%    BMI                31.86 kg/m2          Blood Pressure          Most Recent Value    BP  122/82      Allergies as of 5/17/2017     No Known Allergies      Immunizations Administered on Date of Encounter - 5/17/2017     None      Language Assistance Services     ATTENTION: Language assistance services are available, free of charge. Please call [phone].      ATENCIÓN: Si habla kierra, tiene a terry disposición servicios gratuitos de asistencia lingüística. Llame al [phone].     ADAMARIS Ý: N?u b?n nói " Ti?ng Vi?t, có các d?ch v? h? tr? ngôn ng? mi?n phí dành cho b?n. G?i s? [phone].         [facility] complies with applicable Federal civil rights laws and does not discriminate on the basis of race, color, national origin, age, disability, or sex.

## 2017-05-17 NOTE — TELEPHONE ENCOUNTER
Fatou hernandes-Jayden  States fluticasone (FLONASE) 50 mcg/actuation nasal spray is not covered by pt's insurance. Wants to know if Rx can be changed to Nasonex.

## 2017-05-18 RX ORDER — BUDESONIDE 180 UG/1
AEROSOL, POWDER RESPIRATORY (INHALATION)
Qty: 1 EACH | Refills: 6 | Status: SHIPPED | OUTPATIENT
Start: 2017-05-18 | End: 2020-03-11 | Stop reason: CLARIF

## 2017-05-23 RX ORDER — CARVEDILOL 6.25 MG/1
TABLET ORAL
Qty: 60 TABLET | Refills: 0 | Status: SHIPPED | OUTPATIENT
Start: 2017-05-23 | End: 2017-06-27 | Stop reason: SDUPTHER

## 2017-06-28 RX ORDER — CARVEDILOL 6.25 MG/1
TABLET ORAL
Qty: 60 TABLET | Refills: 0 | Status: SHIPPED | OUTPATIENT
Start: 2017-06-28 | End: 2017-07-19 | Stop reason: SDUPTHER

## 2017-06-29 ENCOUNTER — PATIENT MESSAGE (OUTPATIENT)
Dept: FAMILY MEDICINE | Facility: CLINIC | Age: 62
End: 2017-06-29

## 2017-06-29 RX ORDER — SERTRALINE HYDROCHLORIDE 100 MG/1
100 TABLET, FILM COATED ORAL NIGHTLY
Qty: 90 TABLET | Refills: 0 | Status: SHIPPED | OUTPATIENT
Start: 2017-06-29 | End: 2017-12-14 | Stop reason: SDUPTHER

## 2017-06-29 NOTE — TELEPHONE ENCOUNTER
----- Message from Dinora White sent at 6/29/2017  1:33 PM CDT -----  Contact: Yousuf  [phone]  Pt needs refill on Zoloft . Pls call  Yousuf [phone]. Thanks.......Elis

## 2017-07-12 ENCOUNTER — HOSPITAL ENCOUNTER (OUTPATIENT)
Dept: CARDIOLOGY | Facility: HOSPITAL | Age: 62
Discharge: HOME | End: 2017-07-12
Attending: INTERNAL MEDICINE
Payer: COMMERCIAL

## 2017-07-12 DIAGNOSIS — I50.21 ACUTE SYSTOLIC CONGESTIVE HEART FAILURE: ICD-10-CM

## 2017-07-12 LAB
DIASTOLIC DYSFUNCTION: YES
ESTIMATED PA SYSTOLIC PRESSURE: 44.99
GLOBAL PERICARDIAL EFFUSION: ABNORMAL
MITRAL VALVE REGURGITATION: ABNORMAL
RETIRED EF AND QEF - SEE NOTES: 15 (ref 55–65)
TRICUSPID VALVE REGURGITATION: ABNORMAL

## 2017-07-12 PROCEDURE — 93306 TTE W/DOPPLER COMPLETE: CPT

## 2017-07-12 PROCEDURE — 93306 TTE W/DOPPLER COMPLETE: CPT | Mod: 26,,, | Performed by: INTERNAL MEDICINE

## 2017-07-13 DIAGNOSIS — E11.9 TYPE 2 DIABETES MELLITUS WITHOUT COMPLICATION: ICD-10-CM

## 2017-07-14 ENCOUNTER — PATIENT MESSAGE (OUTPATIENT)
Dept: CARDIOLOGY | Facility: CLINIC | Age: 62
End: 2017-07-14

## 2017-07-15 ENCOUNTER — PATIENT MESSAGE (OUTPATIENT)
Dept: FAMILY MEDICINE | Facility: CLINIC | Age: 62
End: 2017-07-15

## 2017-07-19 ENCOUNTER — OFFICE VISIT (OUTPATIENT)
Dept: CARDIOLOGY | Facility: CLINIC | Age: 62
End: 2017-07-19
Payer: COMMERCIAL

## 2017-07-19 VITALS
HEART RATE: 89 BPM | OXYGEN SATURATION: 95 % | SYSTOLIC BLOOD PRESSURE: 140 MMHG | WEIGHT: 172.81 LBS | HEIGHT: 62 IN | DIASTOLIC BLOOD PRESSURE: 67 MMHG | BODY MASS INDEX: 31.8 KG/M2

## 2017-07-19 DIAGNOSIS — E11.21 CONTROLLED TYPE 2 DIABETES MELLITUS WITH DIABETIC NEPHROPATHY, WITHOUT LONG-TERM CURRENT USE OF INSULIN: ICD-10-CM

## 2017-07-19 DIAGNOSIS — I10 ESSENTIAL HYPERTENSION: ICD-10-CM

## 2017-07-19 DIAGNOSIS — I10 ESSENTIAL (PRIMARY) HYPERTENSION: ICD-10-CM

## 2017-07-19 DIAGNOSIS — E78.5 DYSLIPIDEMIA: ICD-10-CM

## 2017-07-19 DIAGNOSIS — I50.21 ACUTE SYSTOLIC CONGESTIVE HEART FAILURE: Primary | ICD-10-CM

## 2017-07-19 DIAGNOSIS — I44.7 LBBB (LEFT BUNDLE BRANCH BLOCK): ICD-10-CM

## 2017-07-19 DIAGNOSIS — Z72.0 TOBACCO ABUSE: ICD-10-CM

## 2017-07-19 PROCEDURE — 3044F HG A1C LEVEL LT 7.0%: CPT | Mod: S$GLB,,, | Performed by: INTERNAL MEDICINE

## 2017-07-19 PROCEDURE — 99214 OFFICE O/P EST MOD 30 MIN: CPT | Mod: S$GLB,,, | Performed by: INTERNAL MEDICINE

## 2017-07-19 PROCEDURE — 99999 PR PBB SHADOW E&M-EST. PATIENT-LVL III: CPT | Mod: PBBFAC,,, | Performed by: INTERNAL MEDICINE

## 2017-07-19 RX ORDER — CARVEDILOL 25 MG/1
25 TABLET ORAL 2 TIMES DAILY
Qty: 60 TABLET | Refills: 3 | Status: SHIPPED | OUTPATIENT
Start: 2017-07-19 | End: 2017-11-07 | Stop reason: SDUPTHER

## 2017-07-19 NOTE — PROGRESS NOTES
Subjective:    Patient ID:  Hannah Montoya is a 61 y.o. female who presents for follow-up of Hypertension and Results      Hypertension   Pertinent negatives include no chest pain, orthopnea, palpitations, PND or shortness of breath.    previous history:  Here for follow-up of systolic heart failure.  She's been looking good and feeling good.  She's been exercising at cardiac rehabilitation.  She plans to do it on her own afterward.  She's adjusted her diet.  She denies any chest pain, shortness of breath or palpitations.  She's expands no PND, orthopnea or lower edema.  She's denies any dizziness, presyncope or syncope.  She's lost over 20 pounds in the last couple months since we initially met and she was diagnosed with cardiomyopathy.    Today:  Here for follow-up of systolic heart failure.  She's been going to cardiac rehabilitation and doing very well.  She looks good and has high functional status.  She says she can do all her daily activities without any problems.  She had repeat echocardiogram on max medical therapy which showed persistently depressed LV function.  She denies any current chest pain, shortness of breath or palpitations.  She's expands no PND, orthopnea or lower edema.  She denies any dizziness, presyncope or syncope.  We pulmonary load discussed biventricular ICD placement.  She's agreeable to this.    Review of Systems   Constitution: Negative.   HENT: Negative.    Eyes: Negative.    Cardiovascular: Negative for chest pain, dyspnea on exertion, irregular heartbeat, leg swelling, near-syncope, orthopnea, palpitations, paroxysmal nocturnal dyspnea and syncope.   Respiratory: Negative for shortness of breath.    Skin: Negative.    Musculoskeletal: Negative.    Gastrointestinal: Negative for abdominal pain, constipation and diarrhea.   Genitourinary: Negative for dysuria.   Neurological: Negative.    Psychiatric/Behavioral: Negative.         Objective:    Physical Exam   Constitutional:  She is oriented to person, place, and time. She appears well-developed and well-nourished.   HENT:   Head: Normocephalic and atraumatic.   Eyes: Conjunctivae and EOM are normal. Pupils are equal, round, and reactive to light.   Neck: Normal range of motion. Neck supple. No thyromegaly present.   Cardiovascular: Normal rate and regular rhythm.    No murmur heard.  Pulmonary/Chest: Effort normal and breath sounds normal. No respiratory distress.   Abdominal: Soft. Bowel sounds are normal.   Musculoskeletal: She exhibits no edema.   Neurological: She is alert and oriented to person, place, and time.   Skin: Skin is warm and dry.   Psychiatric: She has a normal mood and affect. Her behavior is normal.       R/LHC:  B. Summary/Post-Operative Diagnosis       Normal coronary arteries.    Systolic dysfunction.    Moderately elevated right and left Filling Pressures.    Mild to moderate Pulmonary Hypertension.    Ejection Fraction: 15%  Global LV Function: severely depressed    Air rest:  PW:  (33)  PA: 46  CO_THERM: 4.88  RV: 50  RA:  (23)  LVEDP: 27       E. Angiographic Results     Diagnostic:          Patient has a right dominant coronary artery.        - Left Main Coronary Artery:             The LM is normal. There is CHAD 3 flow.     - Left Anterior Descending Artery:             The LAD is normal. There is CHAD 3 flow.     - Left Circumflex Artery:             The LCX is normal. There is CHAD 3 flow.     - Right Coronary Artery:             The RCA is normal. There is CHAD 3 flow.     - Common Femoral Artery:             The right CFA is normal.    Mag-1.7  Serum creatinine within normal limits  Potassium within normal limits    Echo:  CONCLUSIONS     1 - Severely depressed left ventricular systolic function (EF 15-20%).     2 - Eccentric hypertrophy.     3 - Impaired LV relaxation, elevated LAP (grade 2 diastolic dysfunction).     4 - Severely depressed right ventricular systolic function .     5 - Pulmonary  hypertension. The estimated PA systolic pressure is 45 mmHg.     Assessment:       1. Acute systolic congestive heart failure    2. LBBB (left bundle branch block)    3. Essential (primary) hypertension    4. Controlled type 2 diabetes mellitus with diabetic nephropathy, without long-term current use of insulin    5. Dyslipidemia    6. Essential hypertension    7. Tobacco abuse         Plan:       -cont med tx, max carvedilol to 25 twice a day  -Nonischemic dilated cardiomyopathy ejection fraction 15%  -Meets criteria for biventricular ICD (QRS greater than 120 ms LBBB, New York Heart Association class II with life expectancy greater than one year, greater than 3 months on max medical therapy)  -refer to cardiac rehab  -work release no restrictions    RTC after the procedure    **Risks/benefits of the procedure were d/w the patient including bleeding, infection, ptx, etc.  Patient understands and wishes to proceed.  Consent was placed on the chart.

## 2017-08-09 ENCOUNTER — HOSPITAL ENCOUNTER (OUTPATIENT)
Dept: PREADMISSION TESTING | Facility: HOSPITAL | Age: 62
Discharge: HOME | End: 2017-08-09
Attending: INTERNAL MEDICINE
Payer: COMMERCIAL

## 2017-08-09 VITALS
DIASTOLIC BLOOD PRESSURE: 65 MMHG | BODY MASS INDEX: 31.28 KG/M2 | RESPIRATION RATE: 18 BRPM | SYSTOLIC BLOOD PRESSURE: 112 MMHG | HEIGHT: 62 IN | OXYGEN SATURATION: 92 % | WEIGHT: 170 LBS | TEMPERATURE: 97 F | HEART RATE: 70 BPM

## 2017-08-09 LAB
ANION GAP SERPL CALC-SCNC: 11 MMOL/L
BASOPHILS # BLD AUTO: 0.03 K/UL
BASOPHILS NFR BLD: 0.5 %
BUN SERPL-MCNC: 20 MG/DL
CALCIUM SERPL-MCNC: 10.3 MG/DL
CHLORIDE SERPL-SCNC: 102 MMOL/L
CO2 SERPL-SCNC: 28 MMOL/L
CREAT SERPL-MCNC: 0.9 MG/DL
DIFFERENTIAL METHOD: ABNORMAL
EOSINOPHIL # BLD AUTO: 0.1 K/UL
EOSINOPHIL NFR BLD: 1.5 %
ERYTHROCYTE [DISTWIDTH] IN BLOOD BY AUTOMATED COUNT: 16.1 %
EST. GFR  (AFRICAN AMERICAN): >60 ML/MIN/1.73 M^2
EST. GFR  (NON AFRICAN AMERICAN): >60 ML/MIN/1.73 M^2
GLUCOSE SERPL-MCNC: 101 MG/DL
HCT VFR BLD AUTO: 38.6 %
HGB BLD-MCNC: 13 G/DL
INR PPP: 1
LYMPHOCYTES # BLD AUTO: 1.5 K/UL
LYMPHOCYTES NFR BLD: 25.8 %
MCH RBC QN AUTO: 29.4 PG
MCHC RBC AUTO-ENTMCNC: 33.7 G/DL
MCV RBC AUTO: 87 FL
MONOCYTES # BLD AUTO: 0.6 K/UL
MONOCYTES NFR BLD: 9.8 %
NEUTROPHILS # BLD AUTO: 3.7 K/UL
NEUTROPHILS NFR BLD: 62.4 %
PLATELET # BLD AUTO: 193 K/UL
PMV BLD AUTO: 11.7 FL
POTASSIUM SERPL-SCNC: 4.1 MMOL/L
PROTHROMBIN TIME: 10.4 SEC
RBC # BLD AUTO: 4.42 M/UL
SODIUM SERPL-SCNC: 141 MMOL/L
WBC # BLD AUTO: 5.93 K/UL

## 2017-08-09 PROCEDURE — 93005 ELECTROCARDIOGRAM TRACING: CPT

## 2017-08-09 PROCEDURE — 85025 COMPLETE CBC W/AUTO DIFF WBC: CPT

## 2017-08-09 PROCEDURE — 80048 BASIC METABOLIC PNL TOTAL CA: CPT

## 2017-08-09 PROCEDURE — 93010 ELECTROCARDIOGRAM REPORT: CPT | Mod: ,,, | Performed by: INTERNAL MEDICINE

## 2017-08-09 PROCEDURE — 85610 PROTHROMBIN TIME: CPT

## 2017-08-09 PROCEDURE — 36415 COLL VENOUS BLD VENIPUNCTURE: CPT

## 2017-08-09 NOTE — DISCHARGE INSTRUCTIONS
Your surgery is scheduled for___8/10/2017______________.      Report to SAME DAY SURGERY UNIT at __9:00_____am on the 2nd floor of the hospital.  Use the front entrance of the hospital before 6 am.  If you need wheelchair assistance, call [phone] from your cell phone,  or call 0 from the courtesy phone in the hospital lobby.    Important instructions:   Do not eat or drink after 12 midnight, including water.  It is okay to brush your teeth.  Do not have gum, candy or mints.    Take only these medications with a small swallow of water on the morning of your surgery.___carvedilol, inhalers  .       Please shower the night before and the morning of your surgery.       Use Hibiclens soap to your surgery site if instructed by your pre op nurse.  .  Be sure to rinse off Hibiclens after it is on your skin for several minutes.  Do not use Hibiclens on your face or genitals.      Do not wear make- up, including mascara.     You may wear deodorant only.      Do not wear powder, body lotion or cologne.     Do not wear any jewelry or have any metal on your body.     Please bring any documents given to you by your doctor.     If you are going home on the same day of surgery, you must have arrangements for a ride home.  You will not be able to drive home if you were given anesthesia or sedation.     Do not take any diabetic medication on the morning of surgery unless instructed to do so by your doctor or pre op nurse.     Stop taking Aspirin, Ibuprofen, Motrin and Aleve at least 3-5 days before your surgery. You may use Tylenol.     Stop taking fish oil and vitamin E for least 5 days before surgery.     Wear loose fitting clothes allowing for bandages.     Please leave money and valuables home.       You may bring your cell phone.     Call the doctor if fever or illness should occur before your surgery.    Call [phone] to contact us here at [facility] if needed.

## 2017-08-10 ENCOUNTER — ANESTHESIA (OUTPATIENT)
Dept: CARDIOLOGY | Facility: HOSPITAL | Age: 62
End: 2017-08-10
Payer: COMMERCIAL

## 2017-08-10 ENCOUNTER — HOSPITAL ENCOUNTER (OUTPATIENT)
Facility: HOSPITAL | Age: 62
Discharge: HOME | End: 2017-08-11
Attending: INTERNAL MEDICINE | Admitting: INTERNAL MEDICINE
Payer: COMMERCIAL

## 2017-08-10 ENCOUNTER — SURGERY (OUTPATIENT)
Age: 62
End: 2017-08-10

## 2017-08-10 ENCOUNTER — ANESTHESIA EVENT (OUTPATIENT)
Dept: CARDIOLOGY | Facility: HOSPITAL | Age: 62
End: 2017-08-10
Payer: COMMERCIAL

## 2017-08-10 DIAGNOSIS — I44.7 LEFT BUNDLE-BRANCH BLOCK: ICD-10-CM

## 2017-08-10 DIAGNOSIS — I50.21 ACUTE SYSTOLIC CONGESTIVE HEART FAILURE: Primary | ICD-10-CM

## 2017-08-10 LAB — POCT GLUCOSE: 119 MG/DL (ref 70–110)

## 2017-08-10 PROCEDURE — 25000003 PHARM REV CODE 250: Performed by: NURSE ANESTHETIST, CERTIFIED REGISTERED

## 2017-08-10 PROCEDURE — C1769 GUIDE WIRE: HCPCS

## 2017-08-10 PROCEDURE — D9220A PRA ANESTHESIA: Mod: ,,, | Performed by: ANESTHESIOLOGY

## 2017-08-10 PROCEDURE — 63600175 PHARM REV CODE 636 W HCPCS: Performed by: NURSE ANESTHETIST, CERTIFIED REGISTERED

## 2017-08-10 PROCEDURE — 37000009 HC ANESTHESIA EA ADD 15 MINS: Performed by: INTERNAL MEDICINE

## 2017-08-10 PROCEDURE — 63600175 PHARM REV CODE 636 W HCPCS

## 2017-08-10 PROCEDURE — 25000003 PHARM REV CODE 250: Performed by: INTERNAL MEDICINE

## 2017-08-10 PROCEDURE — 37000008 HC ANESTHESIA 1ST 15 MINUTES: Performed by: INTERNAL MEDICINE

## 2017-08-10 PROCEDURE — 25000003 PHARM REV CODE 250

## 2017-08-10 PROCEDURE — 33249 INSJ/RPLCMT DEFIB W/LEAD(S): CPT | Mod: Q0,,, | Performed by: INTERNAL MEDICINE

## 2017-08-10 PROCEDURE — 27200137 EP LAB PROCEDURE

## 2017-08-10 RX ORDER — NEOSTIGMINE METHYLSULFATE 1 MG/ML
INJECTION, SOLUTION INTRAVENOUS
Status: DISCONTINUED | OUTPATIENT
Start: 2017-08-10 | End: 2017-08-10

## 2017-08-10 RX ORDER — ETOMIDATE 2 MG/ML
INJECTION INTRAVENOUS
Status: DISCONTINUED | OUTPATIENT
Start: 2017-08-10 | End: 2017-08-10

## 2017-08-10 RX ORDER — OXYCODONE AND ACETAMINOPHEN 5; 325 MG/1; MG/1
1 TABLET ORAL EVERY 4 HOURS PRN
Status: DISCONTINUED | OUTPATIENT
Start: 2017-08-10 | End: 2017-08-11 | Stop reason: HOSPADM

## 2017-08-10 RX ORDER — PROPOFOL 10 MG/ML
VIAL (ML) INTRAVENOUS
Status: DISCONTINUED | OUTPATIENT
Start: 2017-08-10 | End: 2017-08-10

## 2017-08-10 RX ORDER — METOCLOPRAMIDE HYDROCHLORIDE 5 MG/ML
INJECTION INTRAMUSCULAR; INTRAVENOUS
Status: DISCONTINUED | OUTPATIENT
Start: 2017-08-10 | End: 2017-08-10

## 2017-08-10 RX ORDER — HYDRALAZINE HYDROCHLORIDE 20 MG/ML
INJECTION INTRAMUSCULAR; INTRAVENOUS
Status: DISCONTINUED | OUTPATIENT
Start: 2017-08-10 | End: 2017-08-10

## 2017-08-10 RX ORDER — GLYCOPYRROLATE 0.2 MG/ML
INJECTION INTRAMUSCULAR; INTRAVENOUS
Status: DISCONTINUED | OUTPATIENT
Start: 2017-08-10 | End: 2017-08-10

## 2017-08-10 RX ORDER — ONDANSETRON 2 MG/ML
INJECTION INTRAMUSCULAR; INTRAVENOUS
Status: DISCONTINUED | OUTPATIENT
Start: 2017-08-10 | End: 2017-08-10

## 2017-08-10 RX ORDER — PHENYLEPHRINE HYDROCHLORIDE 10 MG/ML
INJECTION INTRAVENOUS
Status: DISCONTINUED | OUTPATIENT
Start: 2017-08-10 | End: 2017-08-10

## 2017-08-10 RX ORDER — CETIRIZINE HYDROCHLORIDE 10 MG/1
10 TABLET ORAL NIGHTLY
COMMUNITY

## 2017-08-10 RX ORDER — SODIUM CHLORIDE, SODIUM LACTATE, POTASSIUM CHLORIDE, CALCIUM CHLORIDE 600; 310; 30; 20 MG/100ML; MG/100ML; MG/100ML; MG/100ML
INJECTION, SOLUTION INTRAVENOUS CONTINUOUS PRN
Status: DISCONTINUED | OUTPATIENT
Start: 2017-08-10 | End: 2017-08-10

## 2017-08-10 RX ORDER — MIDAZOLAM HYDROCHLORIDE 1 MG/ML
INJECTION, SOLUTION INTRAMUSCULAR; INTRAVENOUS
Status: DISCONTINUED | OUTPATIENT
Start: 2017-08-10 | End: 2017-08-10

## 2017-08-10 RX ORDER — CEFAZOLIN SODIUM 2 G/50ML
2 SOLUTION INTRAVENOUS
Status: DISPENSED | OUTPATIENT
Start: 2017-08-10 | End: 2017-08-11

## 2017-08-10 RX ORDER — ROCURONIUM BROMIDE 10 MG/ML
INJECTION, SOLUTION INTRAVENOUS
Status: DISCONTINUED | OUTPATIENT
Start: 2017-08-10 | End: 2017-08-10

## 2017-08-10 RX ORDER — FENTANYL CITRATE 50 UG/ML
INJECTION, SOLUTION INTRAMUSCULAR; INTRAVENOUS
Status: DISCONTINUED | OUTPATIENT
Start: 2017-08-10 | End: 2017-08-10

## 2017-08-10 RX ORDER — SUCCINYLCHOLINE CHLORIDE 20 MG/ML
INJECTION INTRAMUSCULAR; INTRAVENOUS
Status: DISCONTINUED | OUTPATIENT
Start: 2017-08-10 | End: 2017-08-10

## 2017-08-10 RX ADMIN — FENTANYL CITRATE 100 MCG: 50 INJECTION INTRAMUSCULAR; INTRAVENOUS at 12:08

## 2017-08-10 RX ADMIN — ROCURONIUM BROMIDE 5 MG: 10 INJECTION, SOLUTION INTRAVENOUS at 12:08

## 2017-08-10 RX ADMIN — HYDRALAZINE HYDROCHLORIDE 5 MG: 20 INJECTION INTRAMUSCULAR; INTRAVENOUS at 05:08

## 2017-08-10 RX ADMIN — FENTANYL CITRATE 50 MCG: 50 INJECTION INTRAMUSCULAR; INTRAVENOUS at 04:08

## 2017-08-10 RX ADMIN — EPHEDRINE SULFATE 5 MG: 50 INJECTION, SOLUTION INTRAMUSCULAR; INTRAVENOUS; SUBCUTANEOUS at 01:08

## 2017-08-10 RX ADMIN — PHENYLEPHRINE HYDROCHLORIDE 200 MCG: 10 INJECTION INTRAVENOUS at 01:08

## 2017-08-10 RX ADMIN — NEOSTIGMINE METHYLSULFATE 5 MG: 1 INJECTION INTRAVENOUS at 05:08

## 2017-08-10 RX ADMIN — EPHEDRINE SULFATE 10 MG: 50 INJECTION, SOLUTION INTRAMUSCULAR; INTRAVENOUS; SUBCUTANEOUS at 03:08

## 2017-08-10 RX ADMIN — EPHEDRINE SULFATE 2.5 MG: 50 INJECTION, SOLUTION INTRAMUSCULAR; INTRAVENOUS; SUBCUTANEOUS at 02:08

## 2017-08-10 RX ADMIN — ROCURONIUM BROMIDE 5 MG: 10 INJECTION, SOLUTION INTRAVENOUS at 02:08

## 2017-08-10 RX ADMIN — SUCCINYLCHOLINE CHLORIDE 100 MG: 20 INJECTION, SOLUTION INTRAMUSCULAR; INTRAVENOUS at 12:08

## 2017-08-10 RX ADMIN — PROPOFOL 30 MG: 10 INJECTION, EMULSION INTRAVENOUS at 03:08

## 2017-08-10 RX ADMIN — SODIUM CHLORIDE, SODIUM LACTATE, POTASSIUM CHLORIDE, AND CALCIUM CHLORIDE: .6; .31; .03; .02 INJECTION, SOLUTION INTRAVENOUS at 12:08

## 2017-08-10 RX ADMIN — ROCURONIUM BROMIDE 10 MG: 10 INJECTION, SOLUTION INTRAVENOUS at 03:08

## 2017-08-10 RX ADMIN — ONDANSETRON 4 MG: 2 INJECTION, SOLUTION INTRAMUSCULAR; INTRAVENOUS at 01:08

## 2017-08-10 RX ADMIN — EPHEDRINE SULFATE 10 MG: 50 INJECTION, SOLUTION INTRAMUSCULAR; INTRAVENOUS; SUBCUTANEOUS at 01:08

## 2017-08-10 RX ADMIN — ROCURONIUM BROMIDE 10 MG: 10 INJECTION, SOLUTION INTRAVENOUS at 04:08

## 2017-08-10 RX ADMIN — EPHEDRINE SULFATE 5 MG: 50 INJECTION, SOLUTION INTRAMUSCULAR; INTRAVENOUS; SUBCUTANEOUS at 03:08

## 2017-08-10 RX ADMIN — SODIUM CHLORIDE, SODIUM LACTATE, POTASSIUM CHLORIDE, AND CALCIUM CHLORIDE: .6; .31; .03; .02 INJECTION, SOLUTION INTRAVENOUS at 03:08

## 2017-08-10 RX ADMIN — ROCURONIUM BROMIDE 10 MG: 10 INJECTION, SOLUTION INTRAVENOUS at 02:08

## 2017-08-10 RX ADMIN — GLYCOPYRROLATE 0.6 MG: 0.2 INJECTION, SOLUTION INTRAMUSCULAR; INTRAVENOUS at 05:08

## 2017-08-10 RX ADMIN — EPHEDRINE SULFATE 25 MG: 50 INJECTION, SOLUTION INTRAMUSCULAR; INTRAVENOUS; SUBCUTANEOUS at 04:08

## 2017-08-10 RX ADMIN — ROCURONIUM BROMIDE 25 MG: 10 INJECTION, SOLUTION INTRAVENOUS at 01:08

## 2017-08-10 RX ADMIN — EPHEDRINE SULFATE 5 MG: 50 INJECTION, SOLUTION INTRAMUSCULAR; INTRAVENOUS; SUBCUTANEOUS at 02:08

## 2017-08-10 RX ADMIN — METOCLOPRAMIDE 10 MG: 5 INJECTION, SOLUTION INTRAMUSCULAR; INTRAVENOUS at 01:08

## 2017-08-10 RX ADMIN — OXYCODONE HYDROCHLORIDE AND ACETAMINOPHEN 1 TABLET: 5; 325 TABLET ORAL at 08:08

## 2017-08-10 RX ADMIN — ETOMIDATE 14 MG: 2 INJECTION, SOLUTION INTRAVENOUS at 12:08

## 2017-08-10 RX ADMIN — EPHEDRINE SULFATE 15 MG: 50 INJECTION, SOLUTION INTRAMUSCULAR; INTRAVENOUS; SUBCUTANEOUS at 03:08

## 2017-08-10 RX ADMIN — FENTANYL CITRATE 50 MCG: 50 INJECTION INTRAMUSCULAR; INTRAVENOUS at 05:08

## 2017-08-10 RX ADMIN — MIDAZOLAM HYDROCHLORIDE 2 MG: 1 INJECTION, SOLUTION INTRAMUSCULAR; INTRAVENOUS at 12:08

## 2017-08-10 RX ADMIN — PROPOFOL 30 MG: 10 INJECTION, EMULSION INTRAVENOUS at 04:08

## 2017-08-10 RX ADMIN — PHENYLEPHRINE HYDROCHLORIDE 100 MCG: 10 INJECTION INTRAVENOUS at 02:08

## 2017-08-10 RX ADMIN — ROCURONIUM BROMIDE 20 MG: 10 INJECTION, SOLUTION INTRAVENOUS at 01:08

## 2017-08-10 NOTE — TRANSFER OF CARE
"Anesthesia Transfer of Care Note    Patient: Hannah Montoya    Procedure(s) Performed: Procedure(s) (LRB):  INSERTION-ICD-BIVENTRICULAR (N/A)    Patient location: PACU    Anesthesia Type: general    Transport from OR: Transported from OR on 100% O2 by closed face mask with adequate spontaneous ventilation    Post pain: adequate analgesia    Post assessment: no apparent anesthetic complications and tolerated procedure well    Post vital signs: stable    Level of consciousness: awake, alert and oriented    Nausea/Vomiting: no nausea/vomiting    Complications: none    Transfer of care protocol was followed      Last vitals:   Visit Vitals  BP (!) 114/55 (BP Location: Right arm, Patient Position: Lying)   Pulse 76   Temp 36.9 °C (98.4 °F) (Oral)   Resp 18   Ht 5' 2" (1.575 m)   Wt 77.1 kg (170 lb)   LMP  (LMP Unknown)   SpO2 100%   BMI 31.09 kg/m²     "

## 2017-08-10 NOTE — H&P
Here for follow-up of systolic heart failure.  She's been going to cardiac rehabilitation and doing very well.  She looks good and has high functional status.  She says she can do all her daily activities without any problems.  She had repeat echocardiogram on max medical therapy which showed persistently depressed LV function.  She denies any current chest pain, shortness of breath or palpitations.  She's expands no PND, orthopnea or lower edema.  She denies any dizziness, presyncope or syncope.  We pulmonary load discussed biventricular ICD placement.  She's agreeable to this.     Review of Systems   Constitution: Negative.   HENT: Negative.    Eyes: Negative.    Cardiovascular: Negative for chest pain, dyspnea on exertion, irregular heartbeat, leg swelling, near-syncope, orthopnea, palpitations, paroxysmal nocturnal dyspnea and syncope.   Respiratory: Negative for shortness of breath.    Skin: Negative.    Musculoskeletal: Negative.    Gastrointestinal: Negative for abdominal pain, constipation and diarrhea.   Genitourinary: Negative for dysuria.   Neurological: Negative.    Psychiatric/Behavioral: Negative.          Objective:    Physical Exam   Constitutional: She is oriented to person, place, and time. She appears well-developed and well-nourished.   HENT:   Head: Normocephalic and atraumatic.   Eyes: Conjunctivae and EOM are normal. Pupils are equal, round, and reactive to light.   Neck: Normal range of motion. Neck supple. No thyromegaly present.   Cardiovascular: Normal rate and regular rhythm.    No murmur heard.  Pulmonary/Chest: Effort normal and breath sounds normal. No respiratory distress.   Abdominal: Soft. Bowel sounds are normal.   Musculoskeletal: She exhibits no edema.   Neurological: She is alert and oriented to person, place, and time.   Skin: Skin is warm and dry.   Psychiatric: She has a normal mood and affect. Her behavior is normal.        R/LHC:  B. Summary/Post-Operative  Diagnosis       Normal coronary arteries.    Systolic dysfunction.    Moderately elevated right and left Filling Pressures.    Mild to moderate Pulmonary Hypertension.     Ejection Fraction: 15%  Global LV Function: severely depressed    Air rest:  PW:  (33)  PA: 46  CO_THERM: 4.88  RV: 50  RA:  (23)  LVEDP: 27       E. Angiographic Results     Diagnostic:          Patient has a right dominant coronary artery.        - Left Main Coronary Artery:             The LM is normal. There is CHAD 3 flow.     - Left Anterior Descending Artery:             The LAD is normal. There is CHAD 3 flow.     - Left Circumflex Artery:             The LCX is normal. There is CHAD 3 flow.     - Right Coronary Artery:             The RCA is normal. There is CHAD 3 flow.     - Common Femoral Artery:             The right CFA is normal.     Mag-1.7  Serum creatinine within normal limits  Potassium within normal limits     Echo:  CONCLUSIONS     1 - Severely depressed left ventricular systolic function (EF 15-20%).     2 - Eccentric hypertrophy.     3 - Impaired LV relaxation, elevated LAP (grade 2 diastolic dysfunction).     4 - Severely depressed right ventricular systolic function .     5 - Pulmonary hypertension. The estimated PA systolic pressure is 45 mmHg.      Assessment:       1. Acute systolic congestive heart failure    2. LBBB (left bundle branch block)    3. Essential (primary) hypertension    4. Controlled type 2 diabetes mellitus with diabetic nephropathy, without long-term current use of insulin    5. Dyslipidemia    6. Essential hypertension    7. Tobacco abuse          Plan:       -cont med tx, max carvedilol to 25 twice a day  -Nonischemic dilated cardiomyopathy ejection fraction 15%  -Meets criteria for biventricular ICD (QRS greater than 120 ms LBBB, New York Heart Association class II with life expectancy greater than one year, greater than 3 months on max medical therapy)  -refer to cardiac rehab  -work release no  restrictions     RTC after the procedure     **Risks/benefits of the procedure were d/w the patient including bleeding, infection, ptx, etc.  Patient understands and wishes to proceed.  Consent was placed on the chart.

## 2017-08-10 NOTE — BRIEF OP NOTE
[facility]  Brief Operative Note    SUMMARY     Surgery Date: 8/10/2017     Surgeon(s) and Role:     * Kevin Lopez MD - Primary    Assisting Surgeon: None    Pre-op Diagnosis:  LBBB (left bundle branch block) [I44.7]  Acute systolic congestive heart failure [I50.21]    Post-op Diagnosis:  Post-Op Diagnosis Codes:     * LBBB (left bundle branch block) [I44.7]     * Acute systolic congestive heart failure [I50.21]    Procedure(s) (LRB):  INSERTION-ICD-BIVENTRICULAR (N/A)    Anesthesia: Monitor Anesthesia Care    Description of Procedure: Dual chamber ICD placement, failure to deliver LV lead    Description of the findings of the procedure: Small lateral vessels which were cannulated with a wire placement but the lead was unable to track wire.  Will review anatomy with EP Dr. Gates.  Consideration is for reattempt or referral to CT surgery for epicardial lead.    Estimated Blood Loss: 100 cc         Specimens:   Specimen (12h ago through future)    None

## 2017-08-11 ENCOUNTER — NURSE TRIAGE (OUTPATIENT)
Dept: ADMINISTRATIVE | Facility: CLINIC | Age: 62
End: 2017-08-11

## 2017-08-11 VITALS
HEIGHT: 62 IN | RESPIRATION RATE: 18 BRPM | HEART RATE: 80 BPM | DIASTOLIC BLOOD PRESSURE: 59 MMHG | OXYGEN SATURATION: 98 % | WEIGHT: 183.5 LBS | BODY MASS INDEX: 33.77 KG/M2 | SYSTOLIC BLOOD PRESSURE: 124 MMHG | TEMPERATURE: 98 F

## 2017-08-11 PROCEDURE — 63600175 PHARM REV CODE 636 W HCPCS: Performed by: INTERNAL MEDICINE

## 2017-08-11 PROCEDURE — 25000003 PHARM REV CODE 250: Performed by: INTERNAL MEDICINE

## 2017-08-11 PROCEDURE — 99217 PR OBSERVATION CARE DISCHARGE: CPT | Mod: ,,, | Performed by: INTERNAL MEDICINE

## 2017-08-11 RX ORDER — CARVEDILOL 6.25 MG/1
25 TABLET ORAL 2 TIMES DAILY
Status: DISCONTINUED | OUTPATIENT
Start: 2017-08-11 | End: 2017-08-11 | Stop reason: HOSPADM

## 2017-08-11 RX ORDER — METFORMIN HYDROCHLORIDE 500 MG/1
500 TABLET ORAL 2 TIMES DAILY
Status: DISCONTINUED | OUTPATIENT
Start: 2017-08-11 | End: 2017-08-11 | Stop reason: HOSPADM

## 2017-08-11 RX ORDER — CETIRIZINE HYDROCHLORIDE 10 MG/1
10 TABLET ORAL DAILY
Status: DISCONTINUED | OUTPATIENT
Start: 2017-08-11 | End: 2017-08-11 | Stop reason: HOSPADM

## 2017-08-11 RX ORDER — SERTRALINE HYDROCHLORIDE 50 MG/1
100 TABLET, FILM COATED ORAL NIGHTLY
Status: DISCONTINUED | OUTPATIENT
Start: 2017-08-11 | End: 2017-08-11 | Stop reason: HOSPADM

## 2017-08-11 RX ORDER — FUROSEMIDE 40 MG/1
40 TABLET ORAL 2 TIMES DAILY
Status: DISCONTINUED | OUTPATIENT
Start: 2017-08-11 | End: 2017-08-11 | Stop reason: HOSPADM

## 2017-08-11 RX ADMIN — CETIRIZINE HYDROCHLORIDE 10 MG: 10 TABLET, FILM COATED ORAL at 09:08

## 2017-08-11 RX ADMIN — FUROSEMIDE 40 MG: 40 TABLET ORAL at 09:08

## 2017-08-11 RX ADMIN — CEFAZOLIN SODIUM 2 G: 2 SOLUTION INTRAVENOUS at 12:08

## 2017-08-11 NOTE — PROGRESS NOTES
WRITTEN HEALTHCARE DISCHARGE INFORMATION     Things that YOU are responsible for to Manage Your Care At Home:  1. Getting your prescriptions filled.  2. Taking you medications as directed. DO NOT MISS ANY DOSES!  3. Going to your follow-up doctor appointments. This is important because it allows the doctor to monitor your progress and to determine if any changes need to be made to your treatment plan.    If you are unable to make your follow up appointments, please call the number listed and reschedule this appointment.     After discharge, if you need assistance, you can call [facility] for 24/7 assistance at [phone]    DO NOT TAKE METFORMIN UNTIL 8/12/17.    Keep arm in sling thru weekend (8/14/17)  Keep surgical dressing dry  No overhead activity with arms        Thank you for choosing [facility] and allowing us to care for you.   From your care management team: Crissy SHERIDAN,RSW & Tammy ANTONIO RN,TN [phone] or [phone]     You should receive a call from [facility] within 48-72 hours to help manage your care after discharge. Please try to make sure that you answer your phone for this important phone call.     Follow-up Information     Kevin Lopez MD. Go on 8/17/2017.    Specialties:  INTERVENTIONAL CARDIOLOGY, Cardiology  Why:  For Appointment on Thursday 8/17/2017 @ 8:40AM  Contact information:  [address] [place] [id number]  [phone]

## 2017-08-11 NOTE — PLAN OF CARE
"   08/11/17 0923   Final Note   Assessment Type Final Discharge Note   Discharge Disposition Home   Discharge planning education complete? Yes   What phone number can be called within the next 1-3 days to see how you are doing after discharge? [phone]   Hospital Follow Up  Appt(s) scheduled? Yes   Discharge plans and expectations educations in teach back method with documentation complete? Yes   Right Care Referral Info   Post Acute Recommendation No Care     DEEP met with pt at bedside. SW reviewed discharge education sheet "HEART FAILURE ZONE AND CHEST PAIN " with pt. Pt voiced understanding. Teachback method applied with pt. SW also reviewed with pt what she is responsible for in order to manage her health at home.     1) Getting medications taking from pharmacy.  2) Taking medications as prescribed.  3) Making Appointments that are scheduled.    Pt verbalized understanding.     DEEP informed AILYN Galvan. Pt cleared to D/C from CM standpoint.     "

## 2017-08-11 NOTE — DISCHARGE SUMMARY
[facility]  Discharge Note    SUMMARY     Admit Date: 8/10/2017    Discharge Date and Time:  08/11/2017 7:40 AM    Hospital Course (synopsis of major diagnoses, care, treatment, and services provided during the course of the hospital stay):     Description of Procedure: Dual chamber ICD placement, failure to deliver LV lead     Description of the findings of the procedure: Small lateral vessels which were cannulated with a wire placement but the lead was unable to track wire.  Will review anatomy with EP Dr. Gates.  Consideration is for reattempt or referral to CT surgery for epicardial lead.     Final Diagnosis: Post-Op Diagnosis Codes:     * LBBB (left bundle branch block) [I44.7]     * Acute systolic congestive heart failure [I50.21]    Disposition: Home or Self Care    Follow Up/Patient Instructions:     Medications:  Reconciled Home Medications:   Current Discharge Medication List      CONTINUE these medications which have NOT CHANGED    Details   albuterol (ACCUNEB) 1.25 mg/3 mL Nebu Take 1.25 mg by nebulization every 6 (six) hours. Rescue      albuterol (PROAIR HFA) 90 mcg/actuation inhaler Inhale 2 puffs into the lungs every 6 (six) hours as needed. Rescue  Qty: 17 g, Refills: 5    Comments: This prescription was filled today(1/27/2017). Any refills authorized will be placed on file.  Associated Diagnoses: Simple chronic bronchitis      carvedilol (COREG) 25 MG tablet Take 1 tablet (25 mg total) by mouth 2 (two) times daily.  Qty: 60 tablet, Refills: 3      cetirizine (ZYRTEC) 10 MG tablet Take 10 mg by mouth once daily.      furosemide (LASIX) 40 MG tablet Take 1 tablet (40 mg total) by mouth 2 (two) times daily.  Qty: 60 tablet, Refills: 3      metformin (GLUCOPHAGE) 500 MG tablet Take 1 tablet (500 mg total) by mouth 2 (two) times daily.  Qty: 60 tablet, Refills: 2      PULMICORT FLEXHALER 180 mcg/actuation AePB INHALE 2 PUFFS BY MOUTH TWICE DAILY  Qty: 1 each, Refills: 6       sertraline (ZOLOFT) 100 MG tablet Take 1 tablet (100 mg total) by mouth every evening.  Qty: 90 tablet, Refills: 0    Comments: This rx is not due until 09/20/16. However, this pt. is in our easy rx program so we send the request early so that the pt. does not run out of meds. If any questions please call [phone]      acetaminophen (TYLENOL) 325 MG tablet Take 650 mg by mouth every 6 (six) hours as needed for Pain.      TRUETEST TEST STRIPS Strp test once EVERY MORNING  Qty: 90 each, Refills: 3    Comments: This Rx is not due & will not be filled until 06/12/16. However this Pt is in our Easy Rx Program, so we send a request early so the Pt does not run put of medication. If any questions please call [phone]  Associated Diagnoses: DM type 2, goal A1C below 8.0           HOLD METFORMIN UNTIL 8/12/17.      Discharge Procedure Orders  Diet general     Activity as tolerated     Other restrictions (specify):   Order Comments: Keep arm in sling thru weekend (8/14/17)  Keep surgical dressing dry  No overhead activity with arms     Call MD for:  temperature >100.4     Call MD for:  persistent nausea and vomiting     Call MD for:  severe uncontrolled pain     Call MD for:  difficulty breathing, headache or visual disturbances     Call MD for:  redness, tenderness, or signs of infection (pain, swelling, redness, odor or green/yellow discharge around incision site)     Call MD for:  hives     Call MD for:  persistent dizziness or light-headedness     Call MD for:  extreme fatigue     Leave dressing on - Keep it clean, dry, and intact until clinic visit       Follow-up Information     Kevin Lopez MD. Schedule an appointment as soon as possible for a visit in 1 week.    Specialties:  INTERVENTIONAL CARDIOLOGY, Cardiology  Contact information:  [address] [place], [place] [address] [phone]                     Diet: ADA 2000    Activity: ad miguel.  Activity restrictions as above.

## 2017-08-11 NOTE — NURSING
Discharge teaching provided. Pamphlet for ICD device given to patient. Current medications reviewed. IV discontinued. Heart monitor discontinued. Daughter at bedside. Transporter notified.

## 2017-08-11 NOTE — NURSING
Received patient from cath lab for dual chamber ICD placement to left chest. Dressing dry/intact. NAD noted. Patient AAO x4. Immobilizer to left arm. Initial vital signs stable. Oriented to room. Family at bedside. Safety precautions in place.

## 2017-08-11 NOTE — PLAN OF CARE
Problem: Pain, Acute (Adult)  Intervention: Support/Optimize Psychosocial Response to Acute Pain  Will medicate with prn pain med as ordered

## 2017-08-11 NOTE — NURSING
In bed eyes closed easy to arouse has no c/o pain/discomfort at this time. No distress noted call light in reach bed alarm set will continue to monitor.

## 2017-08-11 NOTE — NURSING
In bed resting quietly respirations are nonlabored no distress noted bed alarm set. Will continue to monitor.

## 2017-08-11 NOTE — NURSING
Walking rounds made with offgoing nurse received care of pt in bed aaox4 has c/o pain 5/10 to back will medicate pt with prn pain pill as ordered. Assisted to comfortable position in bed juancarlos well has left arm immobilizer in place ice pack applied as ordered. Has o2 2l via n/c vital signs are stable. Clear breath sounds upon auscultation no respiratory distress noted. Pulses palpable active bowel sounds x4 quads. sr with elevated t 76 on telemetry monitor. Left chest wall drsg cdi. Able to wiggle left hand fingers. Hob 30 degrees. Instructed about bedrest until 10pm verbalized understanding. No di stress noted call light in reach bed alarm set will continue to monitor. Has no valuables to be locked up with security

## 2017-08-11 NOTE — NURSING
In bed awake has c/o ha 7/10 but does not want to take prn percocet daughter at bedside and stated pt wanted tylenol and  already told md of pt;s request. No distress noted call light in reach bed alarm set pt voided this am juancarlos well. Vitals stable. Report and walking rounds given to oncoming nurse.

## 2017-08-11 NOTE — NURSING
Hob elevated 30 degrees juancarlos sandwich tray well denies nausea has c/o pain to back 5/10 medicated pt with prn pain pill as ordered juancarlos well ice pack applied to left arm no distress noted call light in reach bed alarm set will continue to monitor visitors at bedside,.

## 2017-08-11 NOTE — NURSING
In bed resting quietly respirations are nonlabored, no distress noted bed alarm set will continue to monitor.

## 2017-08-12 RX ORDER — MOMETASONE FUROATE 50 UG/1
SPRAY, METERED NASAL
Qty: 17 G | Refills: 2 | Status: SHIPPED | OUTPATIENT
Start: 2017-08-12 | End: 2017-11-18 | Stop reason: SDUPTHER

## 2017-08-12 RX ORDER — METFORMIN HYDROCHLORIDE 500 MG/1
TABLET ORAL
Qty: 60 TABLET | Refills: 2 | Status: SHIPPED | OUTPATIENT
Start: 2017-08-12 | End: 2017-11-18 | Stop reason: SDUPTHER

## 2017-08-12 NOTE — TELEPHONE ENCOUNTER
Reason for Disposition   Caller has medication question, adult has minor symptoms, caller declines triage, and triager answers question    Protocols used: ST MEDICATION QUESTION CALL-A-AH    Hannah Johnson' daughter, called to see if there is anything she can take for post-ICD placement discomfort (placed yesterday).  She states it is really more discomfort than pain. No bleeding, redness, or swelling.  Went over discharge medications, which do include Tylenol 650 mg, take every 6 hours for pain.  Elizabeth will give Hannah this dosage, this medication, and she will call back if discomfort increases or if does not respond to tylenol.  Message to Dr Lopez, cardiology. Please contact caller directly with any additional care advice.

## 2017-08-13 NOTE — ANESTHESIA PREPROCEDURE EVALUATION
08/13/2017  Hannah Montoya is a 61 y.o., female.    Anesthesia Evaluation    I have reviewed the Patient Summary Reports.     I have reviewed the Medications.     Review of Systems  Anesthesia Hx:  No problems with previous Anesthesia   Denies Personal Hx of Anesthesia complications.   Hematology/Oncology:  Hematology Normal   Oncology Normal     EENT/Dental:EENT/Dental Normal   Cardiovascular:  Cardiovascular Normal     Pulmonary:  Pulmonary Normal    Renal/:  Renal/ Normal     Hepatic/GI:  Hepatic/GI Normal    Musculoskeletal:  Musculoskeletal Normal    Neurological:  Neurology Normal    Endocrine:  Endocrine Normal    Dermatological:  Skin Normal    Psych:  Psychiatric Normal           Physical Exam  General:  Well nourished    Airway/Jaw/Neck:  Airway Findings: Mouth Opening: Normal Tongue: Normal  Mallampati: II      Dental:  Dental Findings: In tact   Chest/Lungs:  Chest/Lungs Clear    Heart/Vascular:  Heart Findings: Normal Heart murmur: negative       Mental Status:  Mental Status Findings:  Cooperative, Alert and Oriented         Anesthesia Plan  Type of Anesthesia, risks & benefits discussed:  Anesthesia Type:  general  Patient's Preference:   Intra-op Monitoring Plan: standard ASA monitors  Intra-op Monitoring Plan Comments:   Post Op Pain Control Plan: multimodal analgesia  Post Op Pain Control Plan Comments:   Induction:   IV  Beta Blocker:  Patient is on a Beta-Blocker and has received one dose within the past 24 hours (No further documentation required).       Informed Consent: Patient understands risks and agrees with Anesthesia plan.  Questions answered. Anesthesia consent signed with patient.  ASA Score: 3     Day of Surgery Review of History & Physical: I have interviewed and examined the patient. I have reviewed the patient's H&P dated:    H&P update referred to the surgeon.          Ready For Surgery From Anesthesia Perspective.

## 2017-08-14 ENCOUNTER — TELEPHONE (OUTPATIENT)
Dept: CARDIOLOGY | Facility: CLINIC | Age: 62
End: 2017-08-14

## 2017-08-14 PROBLEM — I44.7 LEFT BUNDLE-BRANCH BLOCK: Status: ACTIVE | Noted: 2017-08-14

## 2017-08-17 ENCOUNTER — OFFICE VISIT (OUTPATIENT)
Dept: CARDIOLOGY | Facility: CLINIC | Age: 62
End: 2017-08-17
Payer: COMMERCIAL

## 2017-08-17 VITALS
OXYGEN SATURATION: 96 % | SYSTOLIC BLOOD PRESSURE: 125 MMHG | WEIGHT: 171.94 LBS | BODY MASS INDEX: 31.45 KG/M2 | HEART RATE: 72 BPM | DIASTOLIC BLOOD PRESSURE: 56 MMHG

## 2017-08-17 DIAGNOSIS — E11.21 CONTROLLED TYPE 2 DIABETES MELLITUS WITH DIABETIC NEPHROPATHY, WITHOUT LONG-TERM CURRENT USE OF INSULIN: ICD-10-CM

## 2017-08-17 DIAGNOSIS — I10 ESSENTIAL (PRIMARY) HYPERTENSION: ICD-10-CM

## 2017-08-17 DIAGNOSIS — I44.7 LEFT BUNDLE-BRANCH BLOCK: ICD-10-CM

## 2017-08-17 DIAGNOSIS — E78.5 DYSLIPIDEMIA: ICD-10-CM

## 2017-08-17 DIAGNOSIS — I50.21 ACUTE SYSTOLIC CONGESTIVE HEART FAILURE: Primary | ICD-10-CM

## 2017-08-17 PROCEDURE — 99024 POSTOP FOLLOW-UP VISIT: CPT | Mod: S$GLB,,, | Performed by: INTERNAL MEDICINE

## 2017-08-17 PROCEDURE — 99999 PR PBB SHADOW E&M-EST. PATIENT-LVL III: CPT | Mod: PBBFAC,,, | Performed by: INTERNAL MEDICINE

## 2017-08-17 PROCEDURE — 3078F DIAST BP <80 MM HG: CPT | Mod: S$GLB,,, | Performed by: INTERNAL MEDICINE

## 2017-08-17 PROCEDURE — 3008F BODY MASS INDEX DOCD: CPT | Mod: S$GLB,,, | Performed by: INTERNAL MEDICINE

## 2017-08-17 PROCEDURE — 3074F SYST BP LT 130 MM HG: CPT | Mod: S$GLB,,, | Performed by: INTERNAL MEDICINE

## 2017-08-17 PROCEDURE — 3044F HG A1C LEVEL LT 7.0%: CPT | Mod: S$GLB,,, | Performed by: INTERNAL MEDICINE

## 2017-08-17 NOTE — PROGRESS NOTES
Subjective:    Patient ID:  Hannah Montoya is a 61 y.o. female who presents for follow-up of No chief complaint on file.      Hypertension   Pertinent negatives include no chest pain, orthopnea, palpitations, PND or shortness of breath.    previous history:  Here for follow-up of systolic heart failure.  She's been going to cardiac rehabilitation and doing very well.  She looks good and has high functional status.  She says she can do all her daily activities without any problems.  She had repeat echocardiogram on max medical therapy which showed persistently depressed LV function.  She denies any current chest pain, shortness of breath or palpitations.  She's expands no PND, orthopnea or lower edema.  She denies any dizziness, presyncope or syncope.  We pulmonary load discussed biventricular ICD placement.  She's agreeable to this.    Today:  Here for follow-up of systolic heart failure.  She denies any worsening cardiopulmonary complaints.  Her staples removed without any issues.  She is not expressing any worsening problems in terms of chest pain, shortness of breath palpitations.  She feels much better than when she was initially diagnosed.  She denies any PND, orthopnea or lower edema.  She's interesting getting back to cardiac rehabilitation.  She denies any dizziness, presyncope or syncope.  Otherwise she's better usual state of health.  Her  again is undergoing aortic aneurysm repair percutaneously with Dr. Rodriguez downtowhussain.  She wants to consider upgrade to biventricular device after he is recovered.      Review of Systems   Constitution: Negative.   HENT: Negative.    Eyes: Negative.    Cardiovascular: Negative for chest pain, dyspnea on exertion, irregular heartbeat, leg swelling, near-syncope, orthopnea, palpitations, paroxysmal nocturnal dyspnea and syncope.   Respiratory: Negative for shortness of breath.    Skin: Negative.    Musculoskeletal: Negative.    Gastrointestinal: Negative for  abdominal pain, constipation and diarrhea.   Genitourinary: Negative for dysuria.   Neurological: Negative.    Psychiatric/Behavioral: Negative.         Objective:    Physical Exam   Constitutional: She is oriented to person, place, and time. She appears well-developed and well-nourished.   HENT:   Head: Normocephalic and atraumatic.   Eyes: Conjunctivae and EOM are normal. Pupils are equal, round, and reactive to light.   Neck: Normal range of motion. Neck supple. No thyromegaly present.   Cardiovascular: Normal rate and regular rhythm.    No murmur heard.  Pulmonary/Chest: Effort normal and breath sounds normal. No respiratory distress.   Staples removed and incision is clean/dry/intact   Abdominal: Soft. Bowel sounds are normal.   Musculoskeletal: She exhibits no edema.   Neurological: She is alert and oriented to person, place, and time.   Skin: Skin is warm and dry.   Psychiatric: She has a normal mood and affect. Her behavior is normal.       R/LHC:  B. Summary/Post-Operative Diagnosis       Normal coronary arteries.    Systolic dysfunction.    Moderately elevated right and left Filling Pressures.    Mild to moderate Pulmonary Hypertension.    Ejection Fraction: 15%  Global LV Function: severely depressed    Air rest:  PW:  (33)  PA: 46  CO_THERM: 4.88  RV: 50  RA:  (23)  LVEDP: 27       E. Angiographic Results     Diagnostic:          Patient has a right dominant coronary artery.        - Left Main Coronary Artery:             The LM is normal. There is CHAD 3 flow.     - Left Anterior Descending Artery:             The LAD is normal. There is CHAD 3 flow.     - Left Circumflex Artery:             The LCX is normal. There is CHAD 3 flow.     - Right Coronary Artery:             The RCA is normal. There is CHAD 3 flow.     - Common Femoral Artery:             The right CFA is normal.    Mag-1.7  Serum creatinine within normal limits  Potassium within normal limits    Echo:  CONCLUSIONS     1 - Severely  depressed left ventricular systolic function (EF 15-20%).     2 - Eccentric hypertrophy.     3 - Impaired LV relaxation, elevated LAP (grade 2 diastolic dysfunction).     4 - Severely depressed right ventricular systolic function .     5 - Pulmonary hypertension. The estimated PA systolic pressure is 45 mmHg.     Assessment:       1. Acute systolic congestive heart failure    2. Left bundle-branch block    3. Essential (primary) hypertension    4. Controlled type 2 diabetes mellitus with diabetic nephropathy, without long-term current use of insulin    5. Dyslipidemia         Plan:       -cont med tx  -Nonischemic dilated cardiomyopathy ejection fraction 15%  -Meets criteria for biventricular ICD (QRS greater than 120 ms LBBB, New York Heart Association class II with life expectancy greater than one year, greater than 3 months on max medical therapy)  *Dual-chamber ICD placed with failure to deliver LV lead secondary to small branch vessels of the coronary sinus --> We'll eventually refer for epicardial lead which she wants to have done at [facility]  -work release no restrictions    RTC 1 mo w/ device check

## 2017-08-24 RX ORDER — FUROSEMIDE 40 MG/1
TABLET ORAL
Qty: 60 TABLET | Refills: 0 | Status: SHIPPED | OUTPATIENT
Start: 2017-08-24 | End: 2017-09-17 | Stop reason: SDUPTHER

## 2017-09-12 ENCOUNTER — OFFICE VISIT (OUTPATIENT)
Dept: CARDIOLOGY | Facility: CLINIC | Age: 62
End: 2017-09-12
Payer: COMMERCIAL

## 2017-09-12 VITALS
DIASTOLIC BLOOD PRESSURE: 73 MMHG | HEART RATE: 81 BPM | SYSTOLIC BLOOD PRESSURE: 134 MMHG | BODY MASS INDEX: 31.05 KG/M2 | OXYGEN SATURATION: 95 % | WEIGHT: 169.75 LBS

## 2017-09-12 DIAGNOSIS — E78.5 DYSLIPIDEMIA: ICD-10-CM

## 2017-09-12 DIAGNOSIS — E11.21 CONTROLLED TYPE 2 DIABETES MELLITUS WITH DIABETIC NEPHROPATHY, WITHOUT LONG-TERM CURRENT USE OF INSULIN: ICD-10-CM

## 2017-09-12 DIAGNOSIS — I50.21 ACUTE SYSTOLIC CONGESTIVE HEART FAILURE: Primary | ICD-10-CM

## 2017-09-12 DIAGNOSIS — I10 ESSENTIAL (PRIMARY) HYPERTENSION: ICD-10-CM

## 2017-09-12 DIAGNOSIS — I44.7 LEFT BUNDLE-BRANCH BLOCK: ICD-10-CM

## 2017-09-12 PROCEDURE — 99999 PR PBB SHADOW E&M-EST. PATIENT-LVL III: CPT | Mod: PBBFAC,,, | Performed by: INTERNAL MEDICINE

## 2017-09-12 PROCEDURE — 3044F HG A1C LEVEL LT 7.0%: CPT | Mod: S$GLB,,, | Performed by: INTERNAL MEDICINE

## 2017-09-12 PROCEDURE — 3078F DIAST BP <80 MM HG: CPT | Mod: S$GLB,,, | Performed by: INTERNAL MEDICINE

## 2017-09-12 PROCEDURE — 3008F BODY MASS INDEX DOCD: CPT | Mod: S$GLB,,, | Performed by: INTERNAL MEDICINE

## 2017-09-12 PROCEDURE — 99024 POSTOP FOLLOW-UP VISIT: CPT | Mod: S$GLB,,, | Performed by: INTERNAL MEDICINE

## 2017-09-12 PROCEDURE — 3075F SYST BP GE 130 - 139MM HG: CPT | Mod: S$GLB,,, | Performed by: INTERNAL MEDICINE

## 2017-09-15 NOTE — PROGRESS NOTES
Subjective:    Patient ID:  Hannah Montoya is a 62 y.o. female who presents for follow-up of Congestive Heart Failure (device check ) and Shortness of Breath      Congestive Heart Failure   Associated symptoms include shortness of breath.   Shortness of Breath   Pertinent negatives include no leg swelling, orthopnea, PND or syncope.   Hypertension   Associated symptoms include shortness of breath. Pertinent negatives include no orthopnea or PND.    previous history:  Here for follow-up of systolic heart failure.  She denies any worsening cardiopulmonary complaints.  Her staples removed without any issues.  She is not expressing any worsening problems in terms of chest pain, shortness of breath palpitations.  She feels much better than when she was initially diagnosed.  She denies any PND, orthopnea or lower edema.  She's interesting getting back to cardiac rehabilitation.  She denies any dizziness, presyncope or syncope.  Otherwise she's better usual state of health.  Her  again is undergoing aortic aneurysm repair percutaneously with Dr. Rodriguez downwhussain.  She wants to consider upgrade to biventricular device after he is recovered.    Today:  Here for follow-up of systolic heart failure and ICD check.  She had her device checked without any issues.  She does still get shortness of breath on heavier exertion but relieved with rest.  She denies any chest pain or palpitations.  She's expands no PND, orthopnea or lower edema.  She denies any dizziness, presyncope or syncope.    Review of Systems   Constitution: Negative.   HENT: Negative.    Eyes: Negative.    Cardiovascular: Negative for dyspnea on exertion, irregular heartbeat, leg swelling, orthopnea, paroxysmal nocturnal dyspnea and syncope.   Respiratory: Positive for shortness of breath.    Skin: Negative.    Musculoskeletal: Negative.    Gastrointestinal: Negative for constipation and diarrhea.   Genitourinary: Negative for dysuria.   Neurological:  Negative.    Psychiatric/Behavioral: Negative.         Objective:    Physical Exam   Constitutional: She is oriented to person, place, and time. She appears well-developed and well-nourished.   HENT:   Head: Normocephalic and atraumatic.   Eyes: Conjunctivae and EOM are normal. Pupils are equal, round, and reactive to light.   Neck: Normal range of motion. Neck supple. No thyromegaly present.   Cardiovascular: Normal rate and regular rhythm.    No murmur heard.  Pulmonary/Chest: Effort normal and breath sounds normal. No respiratory distress.   Staples removed and incision is clean/dry/intact   Abdominal: Soft. Bowel sounds are normal.   Musculoskeletal: She exhibits no edema.   Neurological: She is alert and oriented to person, place, and time.   Skin: Skin is warm and dry.   Psychiatric: She has a normal mood and affect. Her behavior is normal.       R/LHC:  B. Summary/Post-Operative Diagnosis       Normal coronary arteries.    Systolic dysfunction.    Moderately elevated right and left Filling Pressures.    Mild to moderate Pulmonary Hypertension.    Ejection Fraction: 15%  Global LV Function: severely depressed    Air rest:  PW:  (33)  PA: 46  CO_THERM: 4.88  RV: 50  RA:  (23)  LVEDP: 27       E. Angiographic Results     Diagnostic:          Patient has a right dominant coronary artery.        - Left Main Coronary Artery:             The LM is normal. There is CHAD 3 flow.     - Left Anterior Descending Artery:             The LAD is normal. There is CHAD 3 flow.     - Left Circumflex Artery:             The LCX is normal. There is CHAD 3 flow.     - Right Coronary Artery:             The RCA is normal. There is CHAD 3 flow.     - Common Femoral Artery:             The right CFA is normal.    Mag-1.7  Serum creatinine within normal limits  Potassium within normal limits    Echo:  CONCLUSIONS     1 - Severely depressed left ventricular systolic function (EF 15-20%).     2 - Eccentric hypertrophy.     3 - Impaired  LV relaxation, elevated LAP (grade 2 diastolic dysfunction).     4 - Severely depressed right ventricular systolic function .     5 - Pulmonary hypertension. The estimated PA systolic pressure is 45 mmHg.     Assessment:       1. Acute systolic congestive heart failure    2. Left bundle-branch block    3. Essential (primary) hypertension    4. Controlled type 2 diabetes mellitus with diabetic nephropathy, without long-term current use of insulin    5. Dyslipidemia         Plan:       -cont med tx  -Nonischemic dilated cardiomyopathy ejection fraction 15%  -Meets criteria for biventricular ICD (QRS greater than 120 ms LBBB, New York Heart Association class II with life expectancy greater than one year, greater than 3 months on max medical therapy)  *Dual-chamber ICD placed with failure to deliver LV lead secondary to small branch vessels of the coronary sinus --> We'll eventually refer for epicardial lead which she wants to have done at [facility]  -work release no restrictions    RTC 3 months

## 2017-09-18 RX ORDER — FUROSEMIDE 40 MG/1
TABLET ORAL
Qty: 60 TABLET | Refills: 0 | Status: SHIPPED | OUTPATIENT
Start: 2017-09-18 | End: 2017-10-31 | Stop reason: SDUPTHER

## 2017-09-19 ENCOUNTER — PATIENT MESSAGE (OUTPATIENT)
Dept: CARDIOLOGY | Facility: CLINIC | Age: 62
End: 2017-09-19

## 2017-10-05 ENCOUNTER — PATIENT MESSAGE (OUTPATIENT)
Dept: CARDIOLOGY | Facility: CLINIC | Age: 62
End: 2017-10-05

## 2017-10-06 ENCOUNTER — PATIENT MESSAGE (OUTPATIENT)
Dept: CARDIOLOGY | Facility: CLINIC | Age: 62
End: 2017-10-06

## 2017-10-20 ENCOUNTER — PATIENT MESSAGE (OUTPATIENT)
Dept: CARDIOLOGY | Facility: CLINIC | Age: 62
End: 2017-10-20

## 2017-10-26 ENCOUNTER — OFFICE VISIT (OUTPATIENT)
Dept: CARDIOLOGY | Facility: CLINIC | Age: 62
End: 2017-10-26
Payer: COMMERCIAL

## 2017-10-26 VITALS
HEART RATE: 91 BPM | WEIGHT: 169.75 LBS | DIASTOLIC BLOOD PRESSURE: 64 MMHG | SYSTOLIC BLOOD PRESSURE: 122 MMHG | BODY MASS INDEX: 31.05 KG/M2 | OXYGEN SATURATION: 93 %

## 2017-10-26 DIAGNOSIS — I44.7 LEFT BUNDLE-BRANCH BLOCK: ICD-10-CM

## 2017-10-26 DIAGNOSIS — I50.21 ACUTE SYSTOLIC CONGESTIVE HEART FAILURE: Primary | ICD-10-CM

## 2017-10-26 DIAGNOSIS — Z72.0 TOBACCO ABUSE: ICD-10-CM

## 2017-10-26 DIAGNOSIS — I10 ESSENTIAL (PRIMARY) HYPERTENSION: ICD-10-CM

## 2017-10-26 DIAGNOSIS — E11.21 CONTROLLED TYPE 2 DIABETES MELLITUS WITH DIABETIC NEPHROPATHY, WITHOUT LONG-TERM CURRENT USE OF INSULIN: ICD-10-CM

## 2017-10-26 DIAGNOSIS — E78.5 DYSLIPIDEMIA: ICD-10-CM

## 2017-10-26 PROCEDURE — 99999 PR PBB SHADOW E&M-EST. PATIENT-LVL III: CPT | Mod: PBBFAC,,, | Performed by: INTERNAL MEDICINE

## 2017-10-26 PROCEDURE — 99024 POSTOP FOLLOW-UP VISIT: CPT | Mod: S$GLB,,, | Performed by: INTERNAL MEDICINE

## 2017-10-26 NOTE — PROGRESS NOTES
Subjective:    Patient ID:  Hannah Montoya is a 62 y.o. female who presents for follow-up of Follow-up (device check ) and Shortness of Breath (fatigue)      Shortness of Breath   Pertinent negatives include no leg swelling, orthopnea, PND or syncope.   Congestive Heart Failure   Associated symptoms include shortness of breath.   Hypertension   Associated symptoms include shortness of breath. Pertinent negatives include no orthopnea or PND.    previous history:  Here for follow-up of systolic heart failure and ICD check.  She had her device checked without any issues.  She does still get shortness of breath on heavier exertion but relieved with rest.  She denies any chest pain or palpitations.  She's expands no PND, orthopnea or lower edema.  She denies any dizziness, presyncope or syncope.    Today:  Here follow-up of systolic heart failure and ICD check.  She had epicardial lead placed by Dr. Barry.  She's here today to device checked and optimize.  The device is been pacing her but she's not been LV synchronized.  She's been feeling bad and notices her pacemaker stimulating her heart.  This was adjusted today in clinic.  She otherwise denies any chest pain or palpitations.  She's expands no PND, orthopnea or lower edema.  She denies any dizziness, presyncope or syncope.  She is excited to get back in the gymnasium.    Review of Systems   Constitution: Negative.   HENT: Negative.    Eyes: Negative.    Cardiovascular: Negative for dyspnea on exertion, irregular heartbeat, leg swelling, orthopnea, paroxysmal nocturnal dyspnea and syncope.   Respiratory: Positive for shortness of breath.    Skin: Negative.    Musculoskeletal: Negative.    Gastrointestinal: Negative for constipation and diarrhea.   Genitourinary: Negative for dysuria.   Neurological: Negative.    Psychiatric/Behavioral: Negative.         Objective:    Physical Exam   Constitutional: She is oriented to person, place, and time. She appears  well-developed and well-nourished.   HENT:   Head: Normocephalic and atraumatic.   Eyes: Conjunctivae and EOM are normal. Pupils are equal, round, and reactive to light.   Neck: Normal range of motion. Neck supple. No thyromegaly present.   Cardiovascular: Normal rate and regular rhythm.    No murmur heard.  Pulmonary/Chest: Effort normal and breath sounds normal. No respiratory distress.   Staples removed and incision is clean/dry/intact   Abdominal: Soft. Bowel sounds are normal.   Musculoskeletal: She exhibits no edema.   Neurological: She is alert and oriented to person, place, and time.   Skin: Skin is warm and dry.   Psychiatric: She has a normal mood and affect. Her behavior is normal.       R/LHC:  B. Summary/Post-Operative Diagnosis       Normal coronary arteries.    Systolic dysfunction.    Moderately elevated right and left Filling Pressures.    Mild to moderate Pulmonary Hypertension.    Ejection Fraction: 15%  Global LV Function: severely depressed    Air rest:  PW:  (33)  PA: 46  CO_THERM: 4.88  RV: 50  RA:  (23)  LVEDP: 27       E. Angiographic Results     Diagnostic:          Patient has a right dominant coronary artery.        - Left Main Coronary Artery:             The LM is normal. There is CHAD 3 flow.     - Left Anterior Descending Artery:             The LAD is normal. There is CHAD 3 flow.     - Left Circumflex Artery:             The LCX is normal. There is CHAD 3 flow.     - Right Coronary Artery:             The RCA is normal. There is CHAD 3 flow.     - Common Femoral Artery:             The right CFA is normal.    Mag-1.7  Serum creatinine within normal limits  Potassium within normal limits    Echo:  CONCLUSIONS     1 - Severely depressed left ventricular systolic function (EF 15-20%).     2 - Eccentric hypertrophy.     3 - Impaired LV relaxation, elevated LAP (grade 2 diastolic dysfunction).     4 - Severely depressed right ventricular systolic function .     5 - Pulmonary  hypertension. The estimated PA systolic pressure is 45 mmHg.     Assessment:       1. Acute systolic congestive heart failure    2. Left bundle-branch block    3. Essential (primary) hypertension    4. Controlled type 2 diabetes mellitus with diabetic nephropathy, without long-term current use of insulin    5. Dyslipidemia    6. Tobacco abuse         Plan:       -cont med tx  -Nonischemic dilated cardiomyopathy ejection fraction 15%  -Status post by V ICD with epicardial lead  -Continue [employer] surveillance    RTC 1 month with device check      [employer] ICD check:    Model: Viva XT  Mode DDD lower rate of 60 bpm    A paced 0%, V paced 99%    A: 2 mV, 532 ohms, 1.125 V at 0.4 ms  RV: 16.6 mV, 313 ohms, 0.625 V at 0.4 ms  LV 3099 ohms, 5.5 V at 0.8 ms    No events  Programmed LV ring to LV tip this decreased LV output to 4.25 V at 1.5 ms    Return to clinic in one month

## 2017-11-01 RX ORDER — FUROSEMIDE 40 MG/1
TABLET ORAL
Qty: 60 TABLET | Refills: 0 | Status: SHIPPED | OUTPATIENT
Start: 2017-11-01 | End: 2017-11-29 | Stop reason: SDUPTHER

## 2017-11-08 RX ORDER — CARVEDILOL 25 MG/1
TABLET ORAL
Qty: 60 TABLET | Refills: 0 | Status: SHIPPED | OUTPATIENT
Start: 2017-11-08 | End: 2017-12-23 | Stop reason: SDUPTHER

## 2017-11-20 RX ORDER — METFORMIN HYDROCHLORIDE 500 MG/1
TABLET ORAL
Qty: 180 TABLET | Refills: 3 | Status: SHIPPED | OUTPATIENT
Start: 2017-11-20 | End: 2018-11-01 | Stop reason: SDUPTHER

## 2017-11-20 RX ORDER — MOMETASONE FUROATE 50 UG/1
SPRAY, METERED NASAL
Qty: 17 G | Refills: 6 | Status: SHIPPED | OUTPATIENT
Start: 2017-11-20 | End: 2018-04-03

## 2017-11-20 NOTE — ANESTHESIA POSTPROCEDURE EVALUATION
"Anesthesia Post Evaluation    Patient: Hannah Montoya    Procedure(s) Performed: Procedure(s) (LRB):  INSERTION-ICD-BIVENTRICULAR (N/A)    Final Anesthesia Type: general  Patient location during evaluation: PACU  Patient participation: Yes- Able to Participate  Level of consciousness: awake and alert and oriented  Post-procedure vital signs: reviewed and stable  Pain management: adequate  Airway patency: patent  PONV status at discharge: No PONV  Anesthetic complications: no      Cardiovascular status: blood pressure returned to baseline and hemodynamically stable  Respiratory status: unassisted, spontaneous ventilation and room air  Hydration status: euvolemic  Follow-up not needed.        Visit Vitals  BP (!) 95/51 (BP Location: Right arm, Patient Position: Lying)   Pulse 71   Temp 36.9 °C (98.4 °F) (Oral)   Resp 16   Ht 5' 2" (1.575 m)   Wt 77.1 kg (170 lb)   LMP  (LMP Unknown)   SpO2 (!) 94%   BMI 31.09 kg/m²       Pain/James Score: Pain Assessment Performed: Yes (8/10/2017  9:00 AM)  Presence of Pain: complains of pain/discomfort (8/10/2017  6:15 PM)      " Rich,     Your blood pressure and exam look good.     Please continue your current medications.     Your PSA (prostate blood test) is a little higher than normal. I recommend we repeat this test in 6 months. We will also recheck your creatinine, which was a little higher than the past 2 years.     Send me a copy of the labs you had done for your workplace screening.     Please schedule a New to Medicare Physical for May.   Stop in a few days prior for labs.

## 2017-11-27 DIAGNOSIS — J42 CHRONIC BRONCHITIS, UNSPECIFIED CHRONIC BRONCHITIS TYPE: ICD-10-CM

## 2017-11-27 RX ORDER — ALBUTEROL SULFATE 1.25 MG/3ML
SOLUTION RESPIRATORY (INHALATION)
Qty: 300 ML | Refills: 3 | Status: SHIPPED | OUTPATIENT
Start: 2017-11-27 | End: 2017-12-26

## 2017-11-28 ENCOUNTER — OFFICE VISIT (OUTPATIENT)
Dept: CARDIOLOGY | Facility: CLINIC | Age: 62
End: 2017-11-28
Payer: COMMERCIAL

## 2017-11-28 VITALS
OXYGEN SATURATION: 100 % | SYSTOLIC BLOOD PRESSURE: 120 MMHG | BODY MASS INDEX: 30.77 KG/M2 | HEART RATE: 80 BPM | DIASTOLIC BLOOD PRESSURE: 64 MMHG | WEIGHT: 168.19 LBS

## 2017-11-28 DIAGNOSIS — I10 ESSENTIAL (PRIMARY) HYPERTENSION: ICD-10-CM

## 2017-11-28 DIAGNOSIS — I44.7 LEFT BUNDLE-BRANCH BLOCK: ICD-10-CM

## 2017-11-28 DIAGNOSIS — E78.5 DYSLIPIDEMIA: ICD-10-CM

## 2017-11-28 DIAGNOSIS — I50.21 ACUTE SYSTOLIC CONGESTIVE HEART FAILURE: Primary | ICD-10-CM

## 2017-11-28 DIAGNOSIS — E11.21 CONTROLLED TYPE 2 DIABETES MELLITUS WITH DIABETIC NEPHROPATHY, WITHOUT LONG-TERM CURRENT USE OF INSULIN: ICD-10-CM

## 2017-11-28 DIAGNOSIS — Z72.0 TOBACCO ABUSE: ICD-10-CM

## 2017-11-28 PROCEDURE — 99999 PR PBB SHADOW E&M-EST. PATIENT-LVL III: CPT | Mod: PBBFAC,,, | Performed by: INTERNAL MEDICINE

## 2017-11-28 PROCEDURE — 99214 OFFICE O/P EST MOD 30 MIN: CPT | Mod: 25,S$GLB,, | Performed by: INTERNAL MEDICINE

## 2017-11-28 PROCEDURE — 93289 INTERROG DEVICE EVAL HEART: CPT | Mod: S$GLB,,, | Performed by: INTERNAL MEDICINE

## 2017-11-28 NOTE — PROGRESS NOTES
Subjective:    Patient ID:  Hannah Montoya is a 62 y.o. female who presents for follow-up of No chief complaint on file.      Shortness of Breath   Pertinent negatives include no leg swelling, orthopnea, PND or syncope.   Congestive Heart Failure   Associated symptoms include shortness of breath.   Hypertension   Associated symptoms include shortness of breath. Pertinent negatives include no orthopnea or PND.    previous history:  Here follow-up of systolic heart failure and ICD check.  She had epicardial lead placed by Dr. Barry.  She's here today to device checked and optimize.  The device is been pacing her but she's not been LV synchronized.  She's been feeling bad and notices her pacemaker stimulating her heart.  This was adjusted today in clinic.  She otherwise denies any chest pain or palpitations.  She's expands no PND, orthopnea or lower edema.  She denies any dizziness, presyncope or syncope.  She is excited to get back in the gymnasium.    Today:  Here for follow-up of systolic heart failure and ICD check.  She denies any worsening cardiopulmonary complaints.  She doesn't notices a huge difference in terms of upgrade after biventricular ICD placement.  She says she's able to do all her daily activities without any problems.  We commented on the fact that she has a strong extension tobacco smoke from secondhand exposure from her .  She denies any current chest pain or palpitations.  She's expands no PND, orthopnea or lower edema.  She gets small tightness occasionally in her sternal area.  Otherwise she's better usual state of health.  She denies any dizziness, presyncope or syncope.  She has no restrictions and wants to get back into exercising.      Review of Systems   Constitution: Negative.   HENT: Negative.    Eyes: Negative.    Cardiovascular: Negative for dyspnea on exertion, irregular heartbeat, leg swelling, orthopnea, paroxysmal nocturnal dyspnea and syncope.   Respiratory:  Positive for shortness of breath.    Skin: Negative.    Musculoskeletal: Negative.    Gastrointestinal: Negative for constipation and diarrhea.   Genitourinary: Negative for dysuria.   Neurological: Negative.    Psychiatric/Behavioral: Negative.         Objective:    Physical Exam   Constitutional: She is oriented to person, place, and time. She appears well-developed and well-nourished.   HENT:   Head: Normocephalic and atraumatic.   Eyes: Conjunctivae and EOM are normal. Pupils are equal, round, and reactive to light.   Neck: Normal range of motion. Neck supple. No thyromegaly present.   Cardiovascular: Normal rate and regular rhythm.    No murmur heard.  Pulmonary/Chest: Effort normal and breath sounds normal. No respiratory distress.   Staples removed and incision is clean/dry/intact   Abdominal: Soft. Bowel sounds are normal.   Musculoskeletal: She exhibits no edema.   Neurological: She is alert and oriented to person, place, and time.   Skin: Skin is warm and dry.   Psychiatric: She has a normal mood and affect. Her behavior is normal.       R/LHC:  B. Summary/Post-Operative Diagnosis       Normal coronary arteries.    Systolic dysfunction.    Moderately elevated right and left Filling Pressures.    Mild to moderate Pulmonary Hypertension.    Ejection Fraction: 15%  Global LV Function: severely depressed    Air rest:  PW:  (33)  PA: 46  CO_THERM: 4.88  RV: 50  RA:  (23)  LVEDP: 27       E. Angiographic Results     Diagnostic:          Patient has a right dominant coronary artery.        - Left Main Coronary Artery:             The LM is normal. There is CHAD 3 flow.     - Left Anterior Descending Artery:             The LAD is normal. There is CHAD 3 flow.     - Left Circumflex Artery:             The LCX is normal. There is CHAD 3 flow.     - Right Coronary Artery:             The RCA is normal. There is CHAD 3 flow.     - Common Femoral Artery:             The right CFA is normal.    Mag-1.7  Serum  creatinine within normal limits  Potassium within normal limits    Echo:  CONCLUSIONS     1 - Severely depressed left ventricular systolic function (EF 15-20%).     2 - Eccentric hypertrophy.     3 - Impaired LV relaxation, elevated LAP (grade 2 diastolic dysfunction).     4 - Severely depressed right ventricular systolic function .     5 - Pulmonary hypertension. The estimated PA systolic pressure is 45 mmHg.     Assessment:       1. Acute systolic congestive heart failure    2. Left bundle-branch block    3. Essential (primary) hypertension    4. Controlled type 2 diabetes mellitus with diabetic nephropathy, without long-term current use of insulin    5. Dyslipidemia    6. Tobacco abuse         Plan:       -cont med tx  -Nonischemic dilated cardiomyopathy ejection fraction 15%  -Status post by BV ICD with epicardial lead  -Continue [employer] surveillance    RTC 3 month with device check      [employer] ICD check:    Model: Viva XT  Mode DDD lower rate of 60 bpm    A paced 0%, V paced 99%    A: 2 mV, 532 ohms, 1.125 V at 0.4 ms  RV: 16.6 mV, 313 ohms, 0.625 V at 0.4 ms  LV 3099 ohms, 5.5 V at 0.8 ms    No events  Programmed LV ring to LV tip this decreased LV output to 4.25 V at 1.5 ms    Return to clinic in 3 month

## 2017-11-29 RX ORDER — FUROSEMIDE 40 MG/1
TABLET ORAL
Qty: 60 TABLET | Refills: 0 | Status: SHIPPED | OUTPATIENT
Start: 2017-11-29 | End: 2017-12-23 | Stop reason: SDUPTHER

## 2017-12-10 ENCOUNTER — PATIENT MESSAGE (OUTPATIENT)
Dept: CARDIOLOGY | Facility: CLINIC | Age: 62
End: 2017-12-10

## 2017-12-12 ENCOUNTER — OFFICE VISIT (OUTPATIENT)
Dept: FAMILY MEDICINE | Facility: CLINIC | Age: 62
End: 2017-12-12
Payer: COMMERCIAL

## 2017-12-12 VITALS
OXYGEN SATURATION: 95 % | HEIGHT: 62 IN | RESPIRATION RATE: 28 BRPM | TEMPERATURE: 98 F | HEART RATE: 107 BPM | WEIGHT: 169.06 LBS | BODY MASS INDEX: 31.11 KG/M2

## 2017-12-12 DIAGNOSIS — J41.0 SIMPLE CHRONIC BRONCHITIS: ICD-10-CM

## 2017-12-12 DIAGNOSIS — J44.1 COPD WITH ACUTE EXACERBATION: Primary | ICD-10-CM

## 2017-12-12 PROCEDURE — 99999 PR PBB SHADOW E&M-EST. PATIENT-LVL III: CPT | Mod: PBBFAC,,, | Performed by: FAMILY MEDICINE

## 2017-12-12 PROCEDURE — 99214 OFFICE O/P EST MOD 30 MIN: CPT | Mod: S$GLB,,, | Performed by: FAMILY MEDICINE

## 2017-12-12 RX ORDER — DOXYCYCLINE 100 MG/1
100 CAPSULE ORAL EVERY 12 HOURS
Qty: 20 CAPSULE | Refills: 0 | Status: SHIPPED | OUTPATIENT
Start: 2017-12-12 | End: 2017-12-22

## 2017-12-12 RX ORDER — ALBUTEROL SULFATE 90 UG/1
2 AEROSOL, METERED RESPIRATORY (INHALATION) EVERY 6 HOURS PRN
Qty: 17 G | Refills: 5 | Status: SHIPPED | OUTPATIENT
Start: 2017-12-12 | End: 2019-03-04 | Stop reason: SDUPTHER

## 2017-12-12 RX ORDER — PREDNISONE 20 MG/1
60 TABLET ORAL DAILY
Qty: 15 TABLET | Refills: 0 | Status: SHIPPED | OUTPATIENT
Start: 2017-12-12 | End: 2017-12-17

## 2017-12-12 NOTE — PROGRESS NOTES
Routine Office Visit    Patient Name: Hannah Montoya    : 1955  MRN: [id number]    Subjective:  Hannah is a 62 y.o. female who presents today for:    1. Shortness of breath and wheezing  Patient presenting today with 4 days of increased shortness of breath and wheezing.  She does have the diagnosis of COPD and CHF.  She states that she has learned to tell the difference between a flare up of her COPD and one of her CHF.  She has been wheezing and having a productive cough.  She states that she does feel tight when she inhales.  No fevers or chills.  She has been using her presribed inhalers as directed.  She is not currently on stiolto of spiriva stating she forgot to refill it.     Past Medical History  Past Medical History:   Diagnosis Date    Asthma     bronchitis in past    Breast cancer     Cardiomyopathy     COPD (chronic obstructive pulmonary disease)     Diabetes mellitus, type 2     Hyperglycemia     Hyperlipidemia     Hypertension        Past Surgical History  Past Surgical History:   Procedure Laterality Date    [facility]SY Right 2016    [facility]    BREAST LUMPECTOMY Right     BREAST SURGERY       SECTION      x2    CHOLECYSTECTOMY      MASTECTOMY W/ SENTINEL NODE BIOPSY      TUBAL LIGATION         Family History  Family History   Problem Relation Age of Onset    Kidney disease Mother     Kidney disease Father     Clotting disorder Brother     Breast cancer Neg Hx     Ovarian cancer Neg Hx        Social History  Social History     Social History    Marital status:      Spouse name: N/A    Number of children: N/A    Years of education: N/A     Occupational History     [employer]     Social History Main Topics    Smoking status: Former Smoker     Packs/day: 0.50     Years: 40.00     Quit date: 2016    Smokeless tobacco: Never Used    Alcohol use 0.0 oz/week      Comment: rare- holiday    Drug use: No    Sexual activity: Not on file     Other  Topics Concern    Not on file     Social History Narrative    No narrative on file       Current Medications  Current Outpatient Prescriptions on File Prior to Visit   Medication Sig Dispense Refill    albuterol (ACCUNEB) 1.25 mg/3 mL Nebu use 1 AMPULE (3ml) via NEBULIZER EVERY 6 HOURS AS NEEDED 300 mL 3    carvedilol (COREG) 25 MG tablet TAKE 1 TABLET(25 MG) BY MOUTH TWICE DAILY 60 tablet 0    cetirizine (ZYRTEC) 10 MG tablet Take 10 mg by mouth once daily.      furosemide (LASIX) 40 MG tablet TAKE 1 TABLET(40 MG) BY MOUTH TWICE DAILY 60 tablet 0    metFORMIN (GLUCOPHAGE) 500 MG tablet TAKE ONE Tablet BY MOUTH TWICE DAILY 180 tablet 3    mometasone (NASONEX) 50 mcg/actuation nasal spray INHALE 1 SPRAY IN EACH NOSTRIL TWICE DAILY 17 g 6    PULMICORT FLEXHALER 180 mcg/actuation AePB INHALE 2 PUFFS BY MOUTH TWICE DAILY 1 each 6    sertraline (ZOLOFT) 100 MG tablet Take 1 tablet (100 mg total) by mouth every evening. 90 tablet 0    TRUETEST TEST STRIPS Strp test once EVERY MORNING 90 each 3    [DISCONTINUED] albuterol (PROAIR HFA) 90 mcg/actuation inhaler Inhale 2 puffs into the lungs every 6 (six) hours as needed. Rescue 17 g 5    [DISCONTINUED] acetaminophen (TYLENOL) 325 MG tablet Take 650 mg by mouth every 6 (six) hours as needed for Pain.      [DISCONTINUED] albuterol (ACCUNEB) 1.25 mg/3 mL Nebu Take 1.25 mg by nebulization every 6 (six) hours. Rescue       No current facility-administered medications on file prior to visit.        Allergies   Review of patient's allergies indicates:   Allergen Reactions    Adhesive Rash       Review of Systems (Pertinent positives)  Review of Systems   Constitutional: Positive for malaise/fatigue. Negative for chills and fever.   HENT: Negative.    Eyes: Negative.    Respiratory: Positive for cough, sputum production, shortness of breath and wheezing. Negative for hemoptysis.    Cardiovascular: Negative for chest pain and leg swelling.   Gastrointestinal: Negative.  "   Genitourinary: Negative.    Musculoskeletal: Positive for myalgias.   Skin: Negative.          Pulse 107   Temp 98.4 °F (36.9 °C) (Oral)   Resp (!) 28   Ht 5' 2" (1.575 m)   Wt 76.7 kg (169 lb 1.5 oz)   LMP  (LMP Unknown)   SpO2 95%   BMI 30.93 kg/m²     GENERAL APPEARANCE: in no apparent distress and well developed and well nourished  HEENT: PERRL, EOMI, Sclera clear, anicteric, Oropharynx clear, no lesions, Neck supple with midline trachea  NECK: normal, supple, no adenopathy, thyroid normal in size  RESPIRATORY: appears well, vitals normal, no respiratory distress, acyanotic, normal RR, chest clear, no wheezing, crepitations, rhonchi, normal symmetric air entry  HEART: regular rate and rhythm, S1, S2 normal, no murmur, click, rub or gallop.    ABDOMEN: abdomen is soft without tenderness, no masses, no hernias, no organomegaly, no rebound, no guarding. Suprapubic tenderness absent. No CVA tenderness.  SKIN: no rashes, no wounds, no other lesions  PSYCH: Alert, oriented x 3, thought content appropriate, speech normal, pleasant and cooperative, good eye contact, well groomed    Assessment/Plan:  Hannah Montoya is a 62 y.o. female who presents today for :    Hannah was seen today for shortness of breath.    Diagnoses and all orders for this visit:    COPD with acute exacerbation  -     albuterol (PROAIR HFA) 90 mcg/actuation inhaler; Inhale 2 puffs into the lungs every 6 (six) hours as needed. Rescue  -     tiotropium-olodaterol (STIOLTO RESPIMAT) 2.5-2.5 mcg/actuation Mist; Inhale 2 puffs into the lungs once daily. Controller  -     doxycycline (VIBRAMYCIN) 100 MG Cap; Take 1 capsule (100 mg total) by mouth every 12 (twelve) hours.  -     predniSONE (DELTASONE) 20 MG tablet; Take 3 tablets (60 mg total) by mouth once daily.    Simple chronic bronchitis  -     albuterol (PROAIR HFA) 90 mcg/actuation inhaler; Inhale 2 puffs into the lungs every 6 (six) hours as needed. Rescue  -     " tiotropium-olodaterol (STIOLTO RESPIMAT) 2.5-2.5 mcg/actuation Mist; Inhale 2 puffs into the lungs once daily. Controller      1.  Patient to take medications as prescribed  2.  Go to the ED should symptoms worsen  3.  Follow up in 4 weeks or sooner if needed    Emanuel Chavez MD

## 2017-12-14 RX ORDER — SERTRALINE HYDROCHLORIDE 100 MG/1
100 TABLET, FILM COATED ORAL NIGHTLY
Qty: 90 TABLET | Refills: 0 | Status: SHIPPED | OUTPATIENT
Start: 2017-12-14 | End: 2018-03-20 | Stop reason: SDUPTHER

## 2017-12-19 ENCOUNTER — PATIENT MESSAGE (OUTPATIENT)
Dept: FAMILY MEDICINE | Facility: CLINIC | Age: 62
End: 2017-12-19

## 2017-12-20 ENCOUNTER — PATIENT MESSAGE (OUTPATIENT)
Dept: FAMILY MEDICINE | Facility: CLINIC | Age: 62
End: 2017-12-20

## 2017-12-23 RX ORDER — FUROSEMIDE 40 MG/1
TABLET ORAL
Qty: 60 TABLET | Refills: 0 | Status: SHIPPED | OUTPATIENT
Start: 2017-12-23 | End: 2018-01-17 | Stop reason: SDUPTHER

## 2017-12-23 RX ORDER — CARVEDILOL 25 MG/1
TABLET ORAL
Qty: 60 TABLET | Refills: 0 | Status: SHIPPED | OUTPATIENT
Start: 2017-12-23 | End: 2018-01-17 | Stop reason: SDUPTHER

## 2017-12-26 ENCOUNTER — TELEPHONE (OUTPATIENT)
Dept: FAMILY MEDICINE | Facility: CLINIC | Age: 62
End: 2017-12-26

## 2017-12-26 ENCOUNTER — HOSPITAL ENCOUNTER (EMERGENCY)
Facility: OTHER | Age: 62
Discharge: HOME | End: 2017-12-26
Attending: EMERGENCY MEDICINE
Payer: COMMERCIAL

## 2017-12-26 VITALS
OXYGEN SATURATION: 95 % | TEMPERATURE: 99 F | SYSTOLIC BLOOD PRESSURE: 106 MMHG | DIASTOLIC BLOOD PRESSURE: 63 MMHG | BODY MASS INDEX: 29.81 KG/M2 | RESPIRATION RATE: 20 BRPM | HEART RATE: 98 BPM | WEIGHT: 162 LBS | HEIGHT: 62 IN

## 2017-12-26 DIAGNOSIS — R07.9 CHEST PAIN: ICD-10-CM

## 2017-12-26 DIAGNOSIS — I50.9 CONGESTIVE HEART FAILURE, UNSPECIFIED CONGESTIVE HEART FAILURE CHRONICITY, UNSPECIFIED CONGESTIVE HEART FAILURE TYPE: Primary | ICD-10-CM

## 2017-12-26 DIAGNOSIS — J44.1 COPD WITH ACUTE EXACERBATION: ICD-10-CM

## 2017-12-26 DIAGNOSIS — J42 CHRONIC BRONCHITIS, UNSPECIFIED CHRONIC BRONCHITIS TYPE: ICD-10-CM

## 2017-12-26 DIAGNOSIS — R06.02 SHORTNESS OF BREATH: ICD-10-CM

## 2017-12-26 LAB
ALBUMIN SERPL-MCNC: 3 G/DL (ref 3.3–5.5)
ALP SERPL-CCNC: 46 U/L (ref 42–141)
BILIRUB SERPL-MCNC: 1.1 MG/DL (ref 0.2–1.6)
BILIRUBIN, POC UA: ABNORMAL
BLOOD, POC UA: NEGATIVE
BUN SERPL-MCNC: 11 MG/DL (ref 7–22)
CALCIUM SERPL-MCNC: 9.6 MG/DL (ref 8–10.3)
CHLORIDE SERPL-SCNC: 92 MMOL/L (ref 98–108)
CLARITY, POC UA: CLEAR
COLOR, POC UA: YELLOW
CREAT SERPL-MCNC: 0.7 MG/DL (ref 0.6–1.2)
CTP QC/QA: YES
FLUAV AG NPH QL: NEGATIVE
FLUBV AG NPH QL: NEGATIVE
GLUCOSE SERPL-MCNC: 164 MG/DL (ref 73–118)
GLUCOSE, POC UA: NEGATIVE
KETONES, POC UA: NEGATIVE
LEUKOCYTE EST, POC UA: NEGATIVE
NITRITE, POC UA: NEGATIVE
PH UR STRIP: 6 [PH]
POC ALT (SGPT): 17 U/L (ref 10–47)
POC AST (SGOT): 16 U/L (ref 11–38)
POC B-TYPE NATRIURETIC PEPTIDE: 458 PG/ML (ref 0–100)
POC CARDIAC TROPONIN I: 0.03 NG/ML
POC PTINR: 1.1 (ref 0.9–1.2)
POC PTWBT: 13.5 SEC (ref 9.7–14.3)
POC TCO2: 27 MMOL/L (ref 18–33)
POTASSIUM BLD-SCNC: 3.5 MMOL/L (ref 3.6–5.1)
PROTEIN, POC UA: ABNORMAL
PROTEIN, POC: 7 G/DL (ref 6.4–8.1)
SAMPLE: NORMAL
SAMPLE: NORMAL
SODIUM BLD-SCNC: 139 MMOL/L (ref 128–145)
SPECIFIC GRAVITY, POC UA: 1.02
UROBILINOGEN, POC UA: 1 E.U./DL

## 2017-12-26 PROCEDURE — 96375 TX/PRO/DX INJ NEW DRUG ADDON: CPT

## 2017-12-26 PROCEDURE — 25000242 PHARM REV CODE 250 ALT 637 W/ HCPCS: Performed by: EMERGENCY MEDICINE

## 2017-12-26 PROCEDURE — 94640 AIRWAY INHALATION TREATMENT: CPT

## 2017-12-26 PROCEDURE — 87804 INFLUENZA ASSAY W/OPTIC: CPT

## 2017-12-26 PROCEDURE — 99285 EMERGENCY DEPT VISIT HI MDM: CPT | Mod: 25

## 2017-12-26 PROCEDURE — 25000003 PHARM REV CODE 250: Performed by: EMERGENCY MEDICINE

## 2017-12-26 PROCEDURE — 84484 ASSAY OF TROPONIN QUANT: CPT

## 2017-12-26 PROCEDURE — 96374 THER/PROPH/DIAG INJ IV PUSH: CPT

## 2017-12-26 PROCEDURE — 80053 COMPREHEN METABOLIC PANEL: CPT

## 2017-12-26 PROCEDURE — 85025 COMPLETE CBC W/AUTO DIFF WBC: CPT

## 2017-12-26 PROCEDURE — 85610 PROTHROMBIN TIME: CPT

## 2017-12-26 PROCEDURE — 63600175 PHARM REV CODE 636 W HCPCS: Performed by: EMERGENCY MEDICINE

## 2017-12-26 PROCEDURE — 83880 ASSAY OF NATRIURETIC PEPTIDE: CPT

## 2017-12-26 PROCEDURE — 81003 URINALYSIS AUTO W/O SCOPE: CPT

## 2017-12-26 RX ORDER — OSELTAMIVIR PHOSPHATE 75 MG/1
75 CAPSULE ORAL 2 TIMES DAILY
Qty: 20 CAPSULE | Refills: 0 | Status: SHIPPED | OUTPATIENT
Start: 2017-12-26 | End: 2018-01-05

## 2017-12-26 RX ORDER — ASPIRIN 325 MG
325 TABLET ORAL
Status: COMPLETED | OUTPATIENT
Start: 2017-12-26 | End: 2017-12-26

## 2017-12-26 RX ORDER — AZITHROMYCIN 250 MG/1
TABLET, FILM COATED ORAL
Qty: 6 TABLET | Refills: 0 | Status: SHIPPED | OUTPATIENT
Start: 2017-12-26 | End: 2017-12-31

## 2017-12-26 RX ORDER — PREDNISONE 20 MG/1
40 TABLET ORAL DAILY
Qty: 10 TABLET | Refills: 0 | Status: SHIPPED | OUTPATIENT
Start: 2017-12-26 | End: 2017-12-31

## 2017-12-26 RX ORDER — FUROSEMIDE 10 MG/ML
40 INJECTION INTRAMUSCULAR; INTRAVENOUS
Status: COMPLETED | OUTPATIENT
Start: 2017-12-26 | End: 2017-12-26

## 2017-12-26 RX ORDER — AZELASTINE 1 MG/ML
1 SPRAY, METERED NASAL 2 TIMES DAILY
Qty: 30 ML | Refills: 0 | Status: SHIPPED | OUTPATIENT
Start: 2017-12-26 | End: 2018-09-25

## 2017-12-26 RX ORDER — POTASSIUM CHLORIDE 20 MEQ/1
40 TABLET, EXTENDED RELEASE ORAL
Status: COMPLETED | OUTPATIENT
Start: 2017-12-26 | End: 2017-12-26

## 2017-12-26 RX ORDER — ALBUTEROL SULFATE 2.5 MG/.5ML
2.5 SOLUTION RESPIRATORY (INHALATION) EVERY 6 HOURS PRN
Qty: 30 EACH | Refills: 0 | Status: SHIPPED | OUTPATIENT
Start: 2017-12-26 | End: 2018-04-03

## 2017-12-26 RX ORDER — METHYLPREDNISOLONE SOD SUCC 125 MG
125 VIAL (EA) INJECTION
Status: COMPLETED | OUTPATIENT
Start: 2017-12-26 | End: 2017-12-26

## 2017-12-26 RX ORDER — IPRATROPIUM BROMIDE AND ALBUTEROL SULFATE 2.5; .5 MG/3ML; MG/3ML
3 SOLUTION RESPIRATORY (INHALATION) ONCE
Status: COMPLETED | OUTPATIENT
Start: 2017-12-26 | End: 2017-12-26

## 2017-12-26 RX ORDER — CARVEDILOL 12.5 MG/1
25 TABLET ORAL
Status: COMPLETED | OUTPATIENT
Start: 2017-12-26 | End: 2017-12-26

## 2017-12-26 RX ADMIN — ASPIRIN 325 MG ORAL TABLET 325 MG: 325 PILL ORAL at 12:12

## 2017-12-26 RX ADMIN — FUROSEMIDE 40 MG: 10 INJECTION, SOLUTION INTRAMUSCULAR; INTRAVENOUS at 01:12

## 2017-12-26 RX ADMIN — METHYLPREDNISOLONE SODIUM SUCCINATE 125 MG: 125 INJECTION, POWDER, FOR SOLUTION INTRAMUSCULAR; INTRAVENOUS at 01:12

## 2017-12-26 RX ADMIN — CARVEDILOL 25 MG: 12.5 TABLET, FILM COATED ORAL at 01:12

## 2017-12-26 RX ADMIN — POTASSIUM CHLORIDE 40 MEQ: 20 TABLET, EXTENDED RELEASE ORAL at 01:12

## 2017-12-26 RX ADMIN — IPRATROPIUM BROMIDE AND ALBUTEROL SULFATE 3 ML: .5; 3 SOLUTION RESPIRATORY (INHALATION) at 01:12

## 2017-12-26 NOTE — TELEPHONE ENCOUNTER
Spoke with the patient's daughter.  [facility] will be [facility] [place].  Requesting appointment for today to be cancelled.

## 2017-12-26 NOTE — TELEPHONE ENCOUNTER
"Incoming call from the Phone Room.  Phone room unable to get in contact with [facility] nurses or Call Center nurses.    Spoke with the patient's daughter.  Patient with chest congestion and reported "COPD flare" with pulse oximetry at 91%."  Reports similar congestion when prescribed antibiotic and steroid.  Requesting refill for medications.  Patient denies chest pain.  Reports intermittent chest tightness with cough.  Instructed to go to the ED due to symptoms and cardiac history.  Instructed the patient to seek emergent evaluation for EKG.  Patient's daughter refused stated patient did not need an ED visit and "symptoms the same as with COPD flare."  Requesting same day appointment.  Appointment scheduled with provider.  Patient also has an appointment with PCP in two days.  Patient does not want to reschedule or cancel PCP appointment.  Instructed to seek emergent evaluation if condition worsens.  Patient given breathing treatment while on the phone.  Patient speaking in clear sentences and denies shortness of breath after breathing treatment.  Reports continued chest tightness after breathing treatment.  Patient's daughter states will try to convince patient to go to the ED.  If so, will call clinic to cancel scheduled appointment today.   "

## 2017-12-26 NOTE — ED PROVIDER NOTES
Encounter Date: 2017       History     Chief Complaint   Patient presents with    Chest Pain     pt presents to ED with c/o chest pain and sob that started yesterday.     Shortness of Breath     A 62-year-old female who presents to the ED with complaints of chest pains and shortness of breath.  Patient reports chest pains 2 weeks ago and her doctor started her on doxycycline.  Patient then begin to have coughing and nasal congestion some days after starting the antibiotics.  Patient states the chest pains and shortness of breath worsen on yesterday.  Patient denies fever or chills.  Patient has a history of COPD, CHF, hypertension, DM, hyperlipidemia.  Pt has a pacemaker.  Patient states she takes furosemide but has not taken it and a few days because she is not feeling well.  Patient has furosemide 40 mg twice a day ordered.  Patient also has not taken her Coreg 25 mg BID in a few days.       The history is provided by the patient.     Review of patient's allergies indicates:   Allergen Reactions    Adhesive Rash     Past Medical History:   Diagnosis Date    Asthma     bronchitis in past    Breast cancer     Cardiomyopathy     COPD (chronic obstructive pulmonary disease)     Diabetes mellitus, type 2     Hyperglycemia     Hyperlipidemia     Hypertension      Past Surgical History:   Procedure Laterality Date    [facility]    [facility]    BREAST LUMPECTOMY Right     BREAST SURGERY       SECTION      x2    CHOLECYSTECTOMY      MASTECTOMY W/ SENTINEL NODE BIOPSY      TUBAL LIGATION       Family History   Problem Relation Age of Onset    Kidney disease Mother     Kidney disease Father     Clotting disorder Brother     Breast cancer Neg Hx     Ovarian cancer Neg Hx      Social History   Substance Use Topics    Smoking status: Former Smoker     Packs/day: 0.50     Years: 40.00     Quit date: 2016    Smokeless tobacco: Never Used    Alcohol use 0.0 oz/week       Comment: rare- holiday     Review of Systems   Constitutional: Negative for fever.   HENT: Negative for sore throat.    Respiratory: Positive for shortness of breath.         Chest pain   Cardiovascular: Negative for chest pain.   Gastrointestinal: Negative for nausea.   Genitourinary: Negative for dysuria.   Musculoskeletal: Negative for back pain.   Skin: Negative for rash.   Neurological: Negative for weakness.   Hematological: Does not bruise/bleed easily.   All other systems reviewed and are negative.      Physical Exam     Initial Vitals [12/26/17 1135]   BP Pulse Resp Temp SpO2   (!) 110/56 (!) 119 20 99.5 °F (37.5 °C) (!) 92 %      MAP       74         Physical Exam    Nursing note and vitals reviewed.  Constitutional: Vital signs are normal. She appears well-developed. She is cooperative. She does not appear ill.   HENT:   Head: Normocephalic and atraumatic.   Right Ear: Tympanic membrane, external ear and ear canal normal.   Left Ear: Tympanic membrane, external ear and ear canal normal.   Nose: Nose normal.   Mouth/Throat: Uvula is midline, oropharynx is clear and moist and mucous membranes are normal.   Eyes: Conjunctivae and lids are normal. Pupils are equal, round, and reactive to light.   Neck: Normal range of motion. Neck supple.   Cardiovascular: Regular rhythm, normal heart sounds and intact distal pulses. Tachycardia present.    Pulmonary/Chest: Effort normal. She has wheezes in the right upper field, the right middle field, the right lower field, the left upper field, the left middle field and the left lower field.   Abdominal: Soft. Normal appearance and bowel sounds are normal. There is no tenderness.   Musculoskeletal: Normal range of motion.   Neurological: She is alert and oriented to person, place, and time.   Skin: Skin is warm, dry and intact. Capillary refill takes less than 2 seconds. No rash noted.   Psychiatric: She has a normal mood and affect. Judgment and thought content normal.  Cognition and memory are normal.         ED Course   Procedures  Labs Reviewed   POCT URINALYSIS W/O SCOPE - Abnormal; Notable for the following:        Result Value    Glucose, UA Negative (*)     Bilirubin, UA 1+ (*)     Ketones, UA Negative (*)     Blood, UA Negative (*)     Protein, UA 3+ (*)     Nitrite, UA Negative (*)     Leukocytes, UA Negative (*)     All other components within normal limits   POCT CMP - Abnormal; Notable for the following:     Albumin, POC 3.0 (*)     POC Chloride 92 (*)     POC Glucose 164 (*)     POC Potassium 3.5 (*)     All other components within normal limits   POCT B-TYPE NATRIURETIC PEPTIDE (BNP) - Abnormal; Notable for the following:     POC B-Type Natriuretic Peptide 458 (*)     All other components within normal limits   TROPONIN ISTAT   POCT CBC   POCT URINALYSIS W/O SCOPE   POCT INFLUENZA A/B   POCT CMP   POCT PROTIME-INR   POCT TROPONIN   POCT B-TYPE NATRIURETIC PEPTIDE (BNP)   ISTAT PROCEDURE     I have reviewed the notes, assessments, and/or procedures performed by NP/PA, I concur with her/his documentation of Hannah Montoya.  Attending:   Physician Attestation Statement: I have reviewed this case with my non-physician provider.   Physician Attestation Statement: I have provided a face to face evaluation of this patient at the request of my non-physician provider.The patient's condition warranted physician involvement. Review of Lab Data - I personally reviewed the lab data. The treatment regimen was reviewed by me. The patient's risk factors required review.   Other Attend Additions:         EKG Readings: (Independently Interpreted)   Rhythm: Paced Rhythm. Heart Rate: 120. Axis: Indeterminate. Other Findings: Prolonged QT Interval. Other Impression: Abnormal paced EKG          Medical Decision Making:   Initial Assessment:   A 62-year-old female who presents to the ED with complaints of chest pains and SOB.  She reports chest pain began 2 weeks ago.  She states  her doctor started her on doxycycline.  Patient states she and a half cough and nasal congestion a few days after starting antibiotics.  Patient states the chest pain and shortness of breath worsened today.  Patient has a history of COPD, CHF, hypertension, DM and hyperlipidemia.  Patient has not taken her furosemide or Coreg today.  Differential Diagnosis:   Acute MI  COPD exacerbation.  CHF  Pneumonia  Clinical Tests:   Lab Tests: Reviewed       <> Summary of Lab: CBC- WBC 16.0, Hemoglobin 12.3, Hematocrit 37.9, Platelet 141  CMP- sodium 139, potassium 3.5, bicarbonate 92, glucose 162,  INR 1.1, troponin 0.03    Radiological Study: Ordered and Reviewed  ED Management:  Physical exam. EKG. CMP, CBC, troponin, BNP.                    ED Course      Clinical Impression:   The primary encounter diagnosis was Congestive heart failure, unspecified congestive heart failure chronicity, unspecified congestive heart failure type. Diagnoses of Shortness of breath, Chest pain, Chronic bronchitis, unspecified chronic bronchitis type, and COPD with acute exacerbation were also pertinent to this visit.                           Chacha Becker,   12/26/17 5149

## 2017-12-28 ENCOUNTER — LAB VISIT (OUTPATIENT)
Dept: LAB | Facility: HOSPITAL | Age: 62
End: 2017-12-28
Attending: FAMILY MEDICINE
Payer: COMMERCIAL

## 2017-12-28 ENCOUNTER — OFFICE VISIT (OUTPATIENT)
Dept: FAMILY MEDICINE | Facility: CLINIC | Age: 62
End: 2017-12-28
Payer: COMMERCIAL

## 2017-12-28 VITALS
RESPIRATION RATE: 20 BRPM | WEIGHT: 164.25 LBS | BODY MASS INDEX: 30.22 KG/M2 | HEART RATE: 75 BPM | OXYGEN SATURATION: 93 % | TEMPERATURE: 98 F | HEIGHT: 62 IN | DIASTOLIC BLOOD PRESSURE: 72 MMHG | SYSTOLIC BLOOD PRESSURE: 128 MMHG

## 2017-12-28 DIAGNOSIS — E11.9 TYPE 2 DIABETES MELLITUS WITHOUT COMPLICATION, WITHOUT LONG-TERM CURRENT USE OF INSULIN: ICD-10-CM

## 2017-12-28 DIAGNOSIS — J44.9 CHRONIC OBSTRUCTIVE PULMONARY DISEASE, UNSPECIFIED COPD TYPE: Primary | ICD-10-CM

## 2017-12-28 DIAGNOSIS — Z12.39 BREAST CANCER SCREENING: ICD-10-CM

## 2017-12-28 DIAGNOSIS — Z12.11 COLON CANCER SCREENING: ICD-10-CM

## 2017-12-28 DIAGNOSIS — Z23 NEED FOR TDAP VACCINATION: ICD-10-CM

## 2017-12-28 PROCEDURE — 83036 HEMOGLOBIN GLYCOSYLATED A1C: CPT

## 2017-12-28 PROCEDURE — 99999 PR PBB SHADOW E&M-EST. PATIENT-LVL III: CPT | Mod: PBBFAC,,, | Performed by: FAMILY MEDICINE

## 2017-12-28 PROCEDURE — 36415 COLL VENOUS BLD VENIPUNCTURE: CPT | Mod: PN

## 2017-12-28 PROCEDURE — 99214 OFFICE O/P EST MOD 30 MIN: CPT | Mod: S$GLB,,, | Performed by: FAMILY MEDICINE

## 2017-12-28 RX ORDER — ALBUTEROL SULFATE 0.83 MG/ML
SOLUTION RESPIRATORY (INHALATION)
Refills: 0 | COMMUNITY
Start: 2017-12-26 | End: 2018-04-03

## 2017-12-28 NOTE — PROGRESS NOTES
Routine Office Visit    Patient Name: Hannah Montoya    : 1955  MRN: [id number]    Subjective:  Hannah is a 62 y.o. female who presents today for:    1. Shortness of breath and wheezing  Patient presenting today with 4 days of increased shortness of breath and wheezing that was seen in the ED on .  She does have the diagnosis of COPD and CHF.  She states that she has learned to tell the difference between a flare up of her COPD and one of her CHF.  She has been wheezing and having a productive cough.  She states that she does feel tight when she inhales.  No fevers or chills.  She has been using her presribed inhalers as directed.  She has taken all meds given by ED and is feeling better.     Past Medical History  Past Medical History:   Diagnosis Date    Asthma     bronchitis in past    Breast cancer     Cardiomyopathy     COPD (chronic obstructive pulmonary disease)     Diabetes mellitus, type 2     Hyperglycemia     Hyperlipidemia     Hypertension        Past Surgical History  Past Surgical History:   Procedure Laterality Date    BREAST BIOPSY Right 2016    [facility]    BREAST LUMPECTOMY Right     BREAST SURGERY       SECTION      x2    CHOLECYSTECTOMY      MASTECTOMY W/ SENTINEL NODE BIOPSY      TUBAL LIGATION         Family History  Family History   Problem Relation Age of Onset    Kidney disease Mother     Kidney disease Father     Clotting disorder Brother     Breast cancer Neg Hx     Ovarian cancer Neg Hx        Social History  Social History     Social History    Marital status:      Spouse name: N/A    Number of children: N/A    Years of education: N/A     Occupational History     [employer]     Social History Main Topics    Smoking status: Former Smoker     Packs/day: 0.50     Years: 40.00     Quit date: 2016    Smokeless tobacco: Never Used    Alcohol use 0.0 oz/week      Comment: rare- holiday    Drug use: No    Sexual activity: Not on  file     Other Topics Concern    Not on file     Social History Narrative    No narrative on file       Current Medications  Current Outpatient Prescriptions on File Prior to Visit   Medication Sig Dispense Refill    albuterol (PROAIR HFA) 90 mcg/actuation inhaler Inhale 2 puffs into the lungs every 6 (six) hours as needed. Rescue 17 g 5    azelastine (ASTELIN) 137 mcg (0.1 %) nasal spray 1 spray (137 mcg total) by Nasal route 2 (two) times daily. 30 mL 0    azithromycin (Z-RICKY) 250 MG tablet Take 2 tablets by mouth on day 1; Take 1 tablet by mouth on days 2-5 6 tablet 0    carvedilol (COREG) 25 MG tablet TAKE 1 TABLET(25 MG) BY MOUTH TWICE DAILY 60 tablet 0    cetirizine (ZYRTEC) 10 MG tablet Take 10 mg by mouth once daily.      furosemide (LASIX) 40 MG tablet TAKE 1 TABLET(40 MG) BY MOUTH TWICE DAILY 60 tablet 0    metFORMIN (GLUCOPHAGE) 500 MG tablet TAKE ONE Tablet BY MOUTH TWICE DAILY 180 tablet 3    mometasone (NASONEX) 50 mcg/actuation nasal spray INHALE 1 SPRAY IN EACH NOSTRIL TWICE DAILY 17 g 6    oseltamivir (TAMIFLU) 75 MG capsule Take 1 capsule (75 mg total) by mouth 2 (two) times daily. 20 capsule 0    predniSONE (DELTASONE) 20 MG tablet Take 2 tablets (40 mg total) by mouth once daily. 10 tablet 0    PULMICORT FLEXHALER 180 mcg/actuation AePB INHALE 2 PUFFS BY MOUTH TWICE DAILY 1 each 6    sertraline (ZOLOFT) 100 MG tablet Take 1 tablet (100 mg total) by mouth every evening. 90 tablet 0    tiotropium-olodaterol (STIOLTO RESPIMAT) 2.5-2.5 mcg/actuation Mist Inhale 2 puffs into the lungs once daily. Controller 4 g 6    TRUETEST TEST STRIPS Strp test once EVERY MORNING 90 each 3    albuterol sulfate 2.5 mg/0.5 mL Nebu Take 2.5 mg by nebulization every 6 (six) hours as needed. Rescue 30 each 0     No current facility-administered medications on file prior to visit.        Allergies   Review of patient's allergies indicates:   Allergen Reactions    Adhesive Rash       Review of Systems  "(Pertinent positives)  Review of Systems   Constitutional: Positive for malaise/fatigue. Negative for chills and fever.   HENT: Negative.    Eyes: Negative.    Respiratory: Positive for cough, shortness of breath and wheezing. Negative for hemoptysis and sputum production.    Cardiovascular: Negative for chest pain and leg swelling.   Gastrointestinal: Negative.    Genitourinary: Negative.    Musculoskeletal: Positive for myalgias.   Skin: Negative.          /72 (BP Location: Left arm, Patient Position: Sitting, BP Method: Medium (Manual))   Pulse 75   Temp 97.7 °F (36.5 °C) (Oral)   Resp 20   Ht 5' 2" (1.575 m)   Wt 74.5 kg (164 lb 3.9 oz)   LMP  (LMP Unknown)   SpO2 (!) 93%   BMI 30.04 kg/m²     GENERAL APPEARANCE: in no apparent distress and well developed and well nourished  HEENT: PERRL, EOMI, Sclera clear, anicteric, Oropharynx clear, no lesions, Neck supple with midline trachea  NECK: normal, supple, no adenopathy, thyroid normal in size  RESPIRATORY: appears well, vitals normal, no respiratory distress, acyanotic, normal RR, chest clear, no wheezing, crepitations, rhonchi, normal symmetric air entry  HEART: regular rate and rhythm, S1, S2 normal, no murmur, click, rub or gallop.    ABDOMEN: abdomen is soft without tenderness, no masses, no hernias, no organomegaly, no rebound, no guarding. Suprapubic tenderness absent. No CVA tenderness.  SKIN: no rashes, no wounds, no other lesions  PSYCH: Alert, oriented x 3, thought content appropriate, speech normal, pleasant and cooperative, good eye contact, well groomed    Assessment/Plan:  Hannah Montoya is a 62 y.o. female who presents today for :    Hannah was seen today for er follow-up.    Diagnoses and all orders for this visit:    Chronic obstructive pulmonary disease, unspecified COPD type    Colon cancer screening  -     Case request GI: COLONOSCOPY    Breast cancer screening  -     Mammo Digital Screening Bilat with CAD; Future    Type 2 " diabetes mellitus without complication, without long-term current use of insulin  -     Hemoglobin A1c; Future    Need for Tdap vaccination  -     Tdap Vaccine    Other orders  -     Cancel: Foot Exam Performed      1.  Patient to take medications as prescribed  2.  Go to the ED should symptoms worsen  3.  Follow up in 4 weeks or sooner if needed  4.  Will get labs done today to monitor diabetes and ordered colonoscopy as she is due for screening    Emanuel Chavez MD

## 2017-12-29 LAB
ESTIMATED AVG GLUCOSE: 126 MG/DL
HBA1C MFR BLD HPLC: 6 %

## 2018-01-04 ENCOUNTER — PATIENT MESSAGE (OUTPATIENT)
Dept: FAMILY MEDICINE | Facility: CLINIC | Age: 63
End: 2018-01-04

## 2018-01-05 ENCOUNTER — CLINICAL SUPPORT (OUTPATIENT)
Dept: FAMILY MEDICINE | Facility: CLINIC | Age: 63
End: 2018-01-05
Payer: COMMERCIAL

## 2018-01-05 VITALS — TEMPERATURE: 98 F

## 2018-01-05 DIAGNOSIS — E11.9 TYPE 2 DIABETES MELLITUS WITHOUT COMPLICATION: ICD-10-CM

## 2018-01-05 DIAGNOSIS — J44.1 COPD EXACERBATION: Primary | ICD-10-CM

## 2018-01-05 PROCEDURE — 99999 PR PBB SHADOW E&M-EST. PATIENT-LVL I: CPT | Mod: PBBFAC,,,

## 2018-01-05 PROCEDURE — 96372 THER/PROPH/DIAG INJ SC/IM: CPT | Mod: S$GLB,,, | Performed by: FAMILY MEDICINE

## 2018-01-05 RX ORDER — CEFTRIAXONE 1 G/1
1 INJECTION, POWDER, FOR SOLUTION INTRAMUSCULAR; INTRAVENOUS
Status: COMPLETED | OUTPATIENT
Start: 2018-01-05 | End: 2018-01-05

## 2018-01-05 RX ADMIN — CEFTRIAXONE 1 G: 1 INJECTION, POWDER, FOR SOLUTION INTRAMUSCULAR; INTRAVENOUS at 08:01

## 2018-01-05 NOTE — PROGRESS NOTES
Patient tolerate Rocephin 1 gm  injection. Patient advise to wait 15 min after injection  for assessment of any posssible side effects.

## 2018-01-06 ENCOUNTER — PATIENT MESSAGE (OUTPATIENT)
Dept: FAMILY MEDICINE | Facility: CLINIC | Age: 63
End: 2018-01-06

## 2018-01-06 DIAGNOSIS — J44.9 CHRONIC OBSTRUCTIVE PULMONARY DISEASE, UNSPECIFIED COPD TYPE: Primary | ICD-10-CM

## 2018-01-08 ENCOUNTER — PATIENT MESSAGE (OUTPATIENT)
Dept: FAMILY MEDICINE | Facility: CLINIC | Age: 63
End: 2018-01-08

## 2018-01-08 RX ORDER — IPRATROPIUM BROMIDE AND ALBUTEROL SULFATE 2.5; .5 MG/3ML; MG/3ML
3 SOLUTION RESPIRATORY (INHALATION) EVERY 6 HOURS PRN
Qty: 1 BOX | Refills: 3 | Status: SHIPPED | OUTPATIENT
Start: 2018-01-08 | End: 2019-05-24 | Stop reason: SDUPTHER

## 2018-01-17 RX ORDER — FUROSEMIDE 40 MG/1
TABLET ORAL
Qty: 60 TABLET | Refills: 6 | Status: SHIPPED | OUTPATIENT
Start: 2018-01-17 | End: 2018-08-03 | Stop reason: SDUPTHER

## 2018-01-17 RX ORDER — CARVEDILOL 25 MG/1
TABLET ORAL
Qty: 60 TABLET | Refills: 6 | Status: SHIPPED | OUTPATIENT
Start: 2018-01-17 | End: 2018-08-03 | Stop reason: SDUPTHER

## 2018-02-22 ENCOUNTER — PATIENT MESSAGE (OUTPATIENT)
Dept: CARDIOLOGY | Facility: CLINIC | Age: 63
End: 2018-02-22

## 2018-03-01 ENCOUNTER — PATIENT MESSAGE (OUTPATIENT)
Dept: FAMILY MEDICINE | Facility: CLINIC | Age: 63
End: 2018-03-01

## 2018-03-05 RX ORDER — FLUTICASONE PROPIONATE 50 MCG
SPRAY, SUSPENSION (ML) NASAL
COMMUNITY
Start: 2018-02-28 | End: 2018-04-03

## 2018-03-05 RX ORDER — ALBUTEROL SULFATE 1.25 MG/3ML
SOLUTION RESPIRATORY (INHALATION)
COMMUNITY
Start: 2018-02-16 | End: 2018-04-03

## 2018-03-05 RX ORDER — TIOTROPIUM BROMIDE AND OLODATEROL 3.124; 2.736 UG/1; UG/1
SPRAY, METERED RESPIRATORY (INHALATION)
Refills: 6 | COMMUNITY
Start: 2017-12-13 | End: 2018-04-03

## 2018-03-09 DIAGNOSIS — E11.9 TYPE 2 DIABETES MELLITUS WITHOUT COMPLICATION: ICD-10-CM

## 2018-03-13 ENCOUNTER — OFFICE VISIT (OUTPATIENT)
Dept: CARDIOLOGY | Facility: CLINIC | Age: 63
End: 2018-03-13
Payer: COMMERCIAL

## 2018-03-13 VITALS
WEIGHT: 171.94 LBS | HEIGHT: 62 IN | SYSTOLIC BLOOD PRESSURE: 134 MMHG | DIASTOLIC BLOOD PRESSURE: 70 MMHG | OXYGEN SATURATION: 95 % | BODY MASS INDEX: 31.64 KG/M2 | HEART RATE: 84 BPM

## 2018-03-13 DIAGNOSIS — Z72.0 TOBACCO ABUSE: ICD-10-CM

## 2018-03-13 DIAGNOSIS — E78.5 DYSLIPIDEMIA: ICD-10-CM

## 2018-03-13 DIAGNOSIS — E11.21 CONTROLLED TYPE 2 DIABETES MELLITUS WITH DIABETIC NEPHROPATHY, WITHOUT LONG-TERM CURRENT USE OF INSULIN: ICD-10-CM

## 2018-03-13 DIAGNOSIS — I10 ESSENTIAL (PRIMARY) HYPERTENSION: ICD-10-CM

## 2018-03-13 DIAGNOSIS — I50.21 ACUTE SYSTOLIC CONGESTIVE HEART FAILURE: Primary | ICD-10-CM

## 2018-03-13 DIAGNOSIS — I44.7 LEFT BUNDLE-BRANCH BLOCK: ICD-10-CM

## 2018-03-13 PROCEDURE — 3078F DIAST BP <80 MM HG: CPT | Mod: CPTII,S$GLB,, | Performed by: INTERNAL MEDICINE

## 2018-03-13 PROCEDURE — 3075F SYST BP GE 130 - 139MM HG: CPT | Mod: CPTII,S$GLB,, | Performed by: INTERNAL MEDICINE

## 2018-03-13 PROCEDURE — 99214 OFFICE O/P EST MOD 30 MIN: CPT | Mod: S$GLB,,, | Performed by: INTERNAL MEDICINE

## 2018-03-13 PROCEDURE — 99999 PR PBB SHADOW E&M-EST. PATIENT-LVL III: CPT | Mod: PBBFAC,,, | Performed by: INTERNAL MEDICINE

## 2018-03-13 PROCEDURE — 3044F HG A1C LEVEL LT 7.0%: CPT | Mod: CPTII,S$GLB,, | Performed by: INTERNAL MEDICINE

## 2018-03-13 PROCEDURE — 93289 INTERROG DEVICE EVAL HEART: CPT | Mod: S$GLB,,, | Performed by: INTERNAL MEDICINE

## 2018-03-13 NOTE — PROGRESS NOTES
Subjective:    Patient ID:  Hannah Montoya is a 62 y.o. female who presents for follow-up of Follow-up      Shortness of Breath   Pertinent negatives include no leg swelling, orthopnea, PND or syncope.   Congestive Heart Failure   Associated symptoms include shortness of breath.   Hypertension   Associated symptoms include shortness of breath. Pertinent negatives include no orthopnea or PND.    previous history:  Here for follow-up of systolic heart failure and ICD check.  She denies any worsening cardiopulmonary complaints.  She doesn't notices a huge difference in terms of upgrade after biventricular ICD placement.  She says she's able to do all her daily activities without any problems.  We commented on the fact that she has a strong extension tobacco smoke from secondhand exposure from her .  She denies any current chest pain or palpitations.  She's expands no PND, orthopnea or lower edema.  She gets small tightness occasionally in her sternal area.  Otherwise she's better usual state of health.  She denies any dizziness, presyncope or syncope.  She has no restrictions and wants to get back into exercising.    Today:  Here for follow-up of systolic heart failure and ICD check.  She denies any worsening cardiopulmonary complaints.  She's been exercising is felt much better.  She's thinks that the device making a difference for her.  She denies any PND, orthopnea or lower edema.  She's not expressing dizziness, presyncope or syncope.    Review of Systems   Constitution: Negative.   HENT: Negative.    Eyes: Negative.    Cardiovascular: Negative for dyspnea on exertion, irregular heartbeat, leg swelling, orthopnea, paroxysmal nocturnal dyspnea and syncope.   Respiratory: Positive for shortness of breath.    Skin: Negative.    Musculoskeletal: Negative.    Gastrointestinal: Negative for constipation and diarrhea.   Genitourinary: Negative for dysuria.   Neurological: Negative.     Psychiatric/Behavioral: Negative.         Objective:    Physical Exam   Constitutional: She is oriented to person, place, and time. She appears well-developed and well-nourished.   HENT:   Head: Normocephalic and atraumatic.   Eyes: Conjunctivae and EOM are normal. Pupils are equal, round, and reactive to light.   Neck: Normal range of motion. Neck supple. No thyromegaly present.   Cardiovascular: Normal rate and regular rhythm.    No murmur heard.  Pulmonary/Chest: Effort normal and breath sounds normal. No respiratory distress.   Staples removed and incision is clean/dry/intact   Abdominal: Soft. Bowel sounds are normal.   Musculoskeletal: She exhibits no edema.   Neurological: She is alert and oriented to person, place, and time.   Skin: Skin is warm and dry.   Psychiatric: She has a normal mood and affect. Her behavior is normal.       R/LHC:  B. Summary/Post-Operative Diagnosis       Normal coronary arteries.    Systolic dysfunction.    Moderately elevated right and left Filling Pressures.    Mild to moderate Pulmonary Hypertension.    Ejection Fraction: 15%  Global LV Function: severely depressed    Air rest:  PW:  (33)  PA: 46  CO_THERM: 4.88  RV: 50  RA:  (23)  LVEDP: 27       E. Angiographic Results     Diagnostic:          Patient has a right dominant coronary artery.        - Left Main Coronary Artery:             The LM is normal. There is CHAD 3 flow.     - Left Anterior Descending Artery:             The LAD is normal. There is CHAD 3 flow.     - Left Circumflex Artery:             The LCX is normal. There is CHAD 3 flow.     - Right Coronary Artery:             The RCA is normal. There is CHAD 3 flow.     - Common Femoral Artery:             The right CFA is normal.    Mag-1.7  Serum creatinine within normal limits  Potassium within normal limits    Echo:  CONCLUSIONS     1 - Severely depressed left ventricular systolic function (EF 15-20%).     2 - Eccentric hypertrophy.     3 - Impaired LV  relaxation, elevated LAP (grade 2 diastolic dysfunction).     4 - Severely depressed right ventricular systolic function .     5 - Pulmonary hypertension. The estimated PA systolic pressure is 45 mmHg.     Assessment:       1. Acute systolic congestive heart failure    2. Left bundle-branch block    3. Essential (primary) hypertension    4. Controlled type 2 diabetes mellitus with diabetic nephropathy, without long-term current use of insulin    5. Dyslipidemia    6. Tobacco abuse         Plan:       -cont med tx  -Nonischemic dilated cardiomyopathy ejection fraction 15%  -Status post by BV ICD with epicardial lead -- high LV threshold draining battery *will need to discuss with EP  *Previously known difficult corner sinus venous anatomy  -Continue Medtr[employer] surveillance    RTC 3 month with device check      Medtronic ICD check:    Model: Viva XT  Mode DDD lower rate of 60 bpm    A paced 0%, V paced 99%    A: 2 mV, 532 ohms, 1.125 V at 0.4 ms  RV: 16.6 mV, 313 ohms, 0.625 V at 0.4 ms  LV 3099 ohms, 5.5 V at 0.8 ms    1 AF episode lasting 5 minutes 12 seconds  Fluid level threshold crossed January 12 through March 10  High LV threshold draining battery    Return to clinic in 3 month

## 2018-03-20 RX ORDER — SERTRALINE HYDROCHLORIDE 100 MG/1
TABLET, FILM COATED ORAL
Qty: 90 TABLET | Refills: 1 | Status: SHIPPED | OUTPATIENT
Start: 2018-03-20 | End: 2018-09-18 | Stop reason: SDUPTHER

## 2018-04-02 DIAGNOSIS — I50.21 ACUTE SYSTOLIC CONGESTIVE HEART FAILURE: Primary | ICD-10-CM

## 2018-04-02 DIAGNOSIS — I44.7 LBBB (LEFT BUNDLE BRANCH BLOCK): ICD-10-CM

## 2018-04-03 ENCOUNTER — OFFICE VISIT (OUTPATIENT)
Dept: ELECTROPHYSIOLOGY | Facility: CLINIC | Age: 63
End: 2018-04-03
Payer: COMMERCIAL

## 2018-04-03 ENCOUNTER — HOSPITAL ENCOUNTER (OUTPATIENT)
Dept: CARDIOLOGY | Facility: CLINIC | Age: 63
Discharge: HOME | End: 2018-04-03
Payer: COMMERCIAL

## 2018-04-03 ENCOUNTER — CLINICAL SUPPORT (OUTPATIENT)
Dept: ELECTROPHYSIOLOGY | Facility: CLINIC | Age: 63
End: 2018-04-03
Attending: INTERNAL MEDICINE
Payer: COMMERCIAL

## 2018-04-03 VITALS
DIASTOLIC BLOOD PRESSURE: 60 MMHG | SYSTOLIC BLOOD PRESSURE: 90 MMHG | HEART RATE: 73 BPM | BODY MASS INDEX: 31.56 KG/M2 | HEIGHT: 62 IN | WEIGHT: 171.5 LBS

## 2018-04-03 DIAGNOSIS — I44.7 LBBB (LEFT BUNDLE BRANCH BLOCK): ICD-10-CM

## 2018-04-03 DIAGNOSIS — I50.21 ACUTE SYSTOLIC CONGESTIVE HEART FAILURE: ICD-10-CM

## 2018-04-03 DIAGNOSIS — I42.0 DILATED CARDIOMYOPATHY: ICD-10-CM

## 2018-04-03 DIAGNOSIS — I44.7 LBBB (LEFT BUNDLE BRANCH BLOCK): Primary | ICD-10-CM

## 2018-04-03 DIAGNOSIS — I44.7 LEFT BUNDLE-BRANCH BLOCK: Primary | ICD-10-CM

## 2018-04-03 PROCEDURE — 93005 ELECTROCARDIOGRAM TRACING: CPT | Mod: S$GLB,,, | Performed by: INTERNAL MEDICINE

## 2018-04-03 PROCEDURE — 99999 PR PBB SHADOW E&M-EST. PATIENT-LVL III: CPT | Mod: PBBFAC,,, | Performed by: INTERNAL MEDICINE

## 2018-04-03 PROCEDURE — 93000 ELECTROCARDIOGRAM COMPLETE: CPT | Mod: 59,S$GLB,, | Performed by: INTERNAL MEDICINE

## 2018-04-03 PROCEDURE — 93010 ELECTROCARDIOGRAM REPORT: CPT | Mod: 76,S$GLB,, | Performed by: INTERNAL MEDICINE

## 2018-04-03 PROCEDURE — 99245 OFF/OP CONSLTJ NEW/EST HI 55: CPT | Mod: S$GLB,,, | Performed by: INTERNAL MEDICINE

## 2018-04-03 PROCEDURE — 93284 PRGRMG EVAL IMPLANTABLE DFB: CPT | Mod: S$GLB,,, | Performed by: INTERNAL MEDICINE

## 2018-04-03 NOTE — LETTER
April 3, 2018      Oumar Cedillo MD  [address]  [place] [address]           [facility] - Arrhythmia  [address]  [place] [address]  Phone: [phone]  Fax: [phone]          Patient: Hannah Montoya   MR Number: [id number]   YOB: 1955   Date of Visit: 4/3/2018       Dear Dr. Oumar Cedillo:    Thank you for referring Hannah Montoya to me for evaluation. Attached you will find relevant portions of my assessment and plan of care.    If you have questions, please do not hesitate to call me. I look forward to following Hannah Montoya along with you.    Sincerely,    Javy Gates MD    Enclosure  CC:  No Recipients    If you would like to receive this communication electronically, please contact [email or web address] or [phone] to request more information on [employer] Link access.    For providers and/or their staff who would like to refer a patient to [facility], please contact us through our one-stop-shop provider referral line, [facility], at [phone].    If you feel you have received this communication in error or would no longer like to receive these types of communications, please e-mail [email or web address]

## 2018-04-03 NOTE — PROGRESS NOTES
Subjective:    Patient ID:  Hannah Montoya is a 62 y.o. female who presents for evaluation of Congestive Heart Failure      62 yoF NICM, LBBB s/p CRT-D here for second opinion regarding transvenous LV lead placement. She has an attempt at an LV lead 8/10/17 by Dr Lopez. The attempt was complicated by tortuous venous anatomy and inability to pass an LV lead. Venograms show mid and anterolateral branches with tortuous courses. The mid lateral branch was accessed with a 014 wire but the lead could not be advanced. She had an epicardial lead implanted however she has elevated thresholds, 5.0 V @ 1.2 ms. She has expected 1-1.5 y on her battery life giving her overall ~2 years with her current device. She has mild improvement in symptoms. QRS went from 168 to 150 ms with CRT pacing. She is here for a second opinion.     Echo 3/30/17:  CONCLUSIONS     1 - Severely depressed left ventricular systolic function (EF 20-25%).     2 - Eccentric hypertrophy.     3 - Severe left atrial enlargement.     4 - Left ventricular diastolic dysfunction.     5 - Severely depressed right ventricular systolic function .     6 - Pulmonary hypertension. The estimated PA systolic pressure is 47 mmHg.     7 - Moderate mitral regurgitation.     8 - Intermediate central venous pressure.     9 - Small pericardial effusion.     Past Medical History:  No date: Asthma      Comment: bronchitis in past  No date: Breast cancer  No date: Cardiomyopathy  No date: COPD (chronic obstructive pulmonary disease)  No date: Diabetes mellitus, type 2  No date: Hyperglycemia  No date: Hyperlipidemia  No date: Hypertension    Past Surgical History:  2016: BREAST BIOPSY Right      Comment: ID[facility]  No date: BREAST LUMPECTOMY Right  No date: BREAST SURGERY  No date:  SECTION      Comment: x2  No date: CHOLECYSTECTOMY  No date: MASTECTOMY W/ SENTINEL NODE BIOPSY  No date: TUBAL LIGATION    Social History    Marital status:              Spouse  name:                       Years of education:                 Number of children:               Occupational History  Occupation          Employer            Comment                                   [email or web address]        Social History Main Topics    Smoking status: Former Smoker                                                                Packs/day: 0.50      Years: 40.00          Quit date: 8/1/2016    Smokeless tobacco: Never Used                        Alcohol use: Yes           0.0 oz/week       Comment: rare- holiday    Drug use: No              Sexual activity: Not on file          Other Topics            Concern    None on file    Social History Narrative    None on file    Review of patient's family history indicates:    Kidney disease                 Mother                    Kidney disease                 Father                    Clotting disorder              Brother                   Breast cancer                  Neg Hx                    Ovarian cancer                 Neg Hx                          Review of Systems   Constitution: Positive for weakness and malaise/fatigue.   HENT: Negative.    Eyes: Negative.    Cardiovascular: Positive for dyspnea on exertion. Negative for chest pain, leg swelling, near-syncope, palpitations and syncope.   Respiratory: Negative.  Negative for shortness of breath.    Endocrine: Negative.    Hematologic/Lymphatic: Negative.    Skin: Negative.    Musculoskeletal: Negative.    Gastrointestinal: Negative.    Genitourinary: Negative.    Neurological: Negative for dizziness and light-headedness.   Psychiatric/Behavioral: Negative.    Allergic/Immunologic: Negative.         Objective:    Physical Exam   Constitutional: She is oriented to person, place, and time. She appears well-developed and well-nourished. No distress.   HENT:   Head: Normocephalic and atraumatic.   Eyes: EOM are normal. Pupils are equal, round, and reactive to light. Right eye exhibits no  discharge. Left eye exhibits no discharge.   Neck: Normal range of motion. No JVD present. No thyromegaly present.   Cardiovascular: Normal rate, regular rhythm, S1 normal, S2 normal and normal heart sounds.  PMI is not displaced.  Exam reveals no gallop and no friction rub.    No murmur heard.  Pulmonary/Chest: Effort normal and breath sounds normal. No respiratory distress. She has no wheezes. She has no rales.   L upper chest incision with underlying generator   Abdominal: Soft. Bowel sounds are normal. She exhibits no distension. There is no tenderness. There is no rebound and no guarding.   Musculoskeletal: Normal range of motion. She exhibits no edema or tenderness.   Neurological: She is alert and oriented to person, place, and time. No cranial nerve deficit.   Skin: Skin is warm and dry. No rash noted. She is not diaphoretic. No erythema.   Psychiatric: She has a normal mood and affect. Her behavior is normal. Judgment and thought content normal.   Vitals reviewed.    ECg: NSR, CRT paced  ms        Assessment:       1. Left bundle-branch block    2. Dilated cardiomyopathy         Plan:       62 yoF NICM, LBBB s/p CRT-D here for second opinion regarding her LV lead. Her current LV lead is failing with elevated thresholds and significant battery drain. I offered another attempt at a transvenous route. Extensive discussion regarding risks, benefits, and alternative approaches to the procedure was had with the patient.  The patient voices understanding and all questions have been answered.  The patient would like to proceed as planned.    Transvenous LV lead attempt  Anesthesia, MAC fine  Keep MDT as the vendor  Prepare for CS venoplasty if needed

## 2018-04-04 ENCOUNTER — TELEPHONE (OUTPATIENT)
Dept: ELECTROPHYSIOLOGY | Facility: CLINIC | Age: 63
End: 2018-04-04

## 2018-04-04 ENCOUNTER — PATIENT MESSAGE (OUTPATIENT)
Dept: ELECTROPHYSIOLOGY | Facility: CLINIC | Age: 63
End: 2018-04-04

## 2018-04-04 DIAGNOSIS — I42.0 DILATED CARDIOMYOPATHY: Primary | ICD-10-CM

## 2018-04-04 DIAGNOSIS — I44.7 LEFT BUNDLE-BRANCH BLOCK: ICD-10-CM

## 2018-04-05 NOTE — TELEPHONE ENCOUNTER
IMPLANTABLE DEVICE EDUCATION CHECKLIST    4/18/18  PRE - PROCEDURE LABS HAVE BEEN ORDERED FOR YOU @ [facility]  BE SURE TO ARRIVE AT YOUR SCHEDULED TIME FOR THIS LAB WORK!  (YOU DO NOT HAVE TO FAST FOR THIS LABWORK!!!!)    4/27/18 @ 5:30 AM REPORT TO CARDIOLOGY WAITING ROOM ON 3RD FLOOR OF HOSPITAL     WASH YOUR CHEST WITH HIBICLENS OR AN ANTIBACTERIAL SOAP (SUCH AS DIAL) ON THE NIGHT BEFORE AND THE MORNING OF YOUR PROCEDURE.    DO NOT EAT OR DRINK ANYTHING AFTER: 12 MN ON THE NIGHT BEFORE YOUR PROCEDURE    MEDICATIONS  HOLD YOUR DIABETES MEDICATION: METFORMIN (GLUCOPHAGE) ON THE MORNING OF YOUR PROCEDURE  HOLD YOUR FLUID PILL: LASIX (FUROSEMIDE) ON THE MORNING OF YOUR PROCEDURE  YOU MAY TAKE YOUR OTHER USUAL MORNING MEDICATIONS WITH A SIP OF WATER    DIRECTIONS TO CARDIOLOGY WAITING ROOM  If you park in the Parking Garage:  Take elevators to the 2nd floor  Walk up ramp and turn right by [employer] Elevators  Take elevator to the 3rd floor  Upon exiting the elevator, turn away from the clinic areas  Walk long [address] around to front of hospital to area with windows overlooking [facility]  Check in at Reception Desk  OR  If family is dropping you off:  Have them drop you off at the front of the Hospital  (Near the ER, where all the flags are hung).  Take the E elevators to the 3rd floor.  Check in at the Reception Desk in the waiting room.    YOU WILL BE SPENDING THE NIGHT AFTER YOUR PROCEDURE  YOU WILL NEED SOMEONE TO DRIVE YOU HOME THE DAY AFTER YOUR PROCEDURE    YOUR PAIN DURING YOUR PROCEDURE WILL BE MANAGED BY THE ANESTHESIA TEAM    Any need to reschedule or cancel procedures, or any questions regarding your procedures should be addressed directly with the Arrhythmia Department Nurses at the following phone number: [phone]    THE ABOVE INSTRUCTIONS WERE GIVEN TO THE PATIENT VERBALLY AND THEY VERBALIZED UNDERSTANDING. THEY DO NOT REQUIRE ANY SPECIAL NEEDS AND DO NOT HAVE ANY LEARNING BARRIERS.

## 2018-04-05 NOTE — TELEPHONE ENCOUNTER
Post-Procedure Patient Discharge Instructions  Pacemaker/Defibrillator  Wound Care   If you are discharged with a standard dressing over the incision, you may remove the dressing after 24 hours.    If you are discharged with an AQUACEL dressing, you should keep it on until your follow-up appointment in 1-2 weeks.   If there are Steri-strips (strips of tape) over your incision, leave them on until your follow-up appointment. They may begin to fall off on their own, which is normal. If there is Dermabond (clear glue) over your incision, do not scrub it off. It acts as a barrier and will eventually disappear.   You will be discharged with 5 days of oral antibiotics. Please take the full prescription until it is gone.   Do not get the incision wet for 48 hours following the procedure. You may sponge bath during this period, working around the incision. After 48 hours, you may shower, but you should still try to keep this area as dry as possible, and avoid direct water contact to the incision (allow the water to hit back of your shoulder rather than directly on the incision). Gently pat the incision dry if it does get wet.   You may take regular showers after 2 weeks, unless otherwise indicated at your follow-up visit.   Do not submerge the incision in a tub, pool, or body of water for 6 weeks.   Avoid using deodorants, powders, creams, lotions, etc. on your incision for 6 weeks.   If you notice unusual swelling, redness, drainage, have more device site pain, chest pain, shortness of breath, or have a fever greater than 100 degrees, call our device clinic immediately: [phone] or [phone] during normal office hours. You may call [phone] after-hours or on weekends and ask for the electrophysiologist on call.  Activity    If you only had a battery/generator change performed, there are no postoperative activity restrictions.    If this is your first device or if you had new wires added to your  existing device, then you will be discharged in a sling which you should wear continuously for 48 hours. After that, the sling should remain off during the day but should be worn at night for another 2-4 weeks (depending on how active a sleeper you are).   Do not raise your device-side arm above your shoulder for 6 weeks. Do not lift more than 5-10 lbs with your device-side arm for 6 weeks.    If you were driving prior to the procedure, you may resume driving after your first follow-up appointment (1-2 weeks). If you have a history of passing out or a history of certain arrhythmias, there may be driving restrictions unrelated to the procedure. Please clarify with your physician if this is the case.   No heavy activity with the affected arm for 6 weeks (eg. tennis, golf, bowling, aerobics, mowing the lawn, etc.).   Avoid rough contact at the device site for 6 weeks.   You may participate in sexual activity unless otherwise instructed.   You may return to work within 3-5 days unless told otherwise, provided you adhere to the above activity restrictions.  Long-Term Instructions   Keep your pacemaker or defibrillator identification card with you at all times.   If you have a defibrillator and you get shocked by the device: If you receive one shock and you feel ok, you may call [phone] or [phone] during normal office hours. You may call [phone] after-hours. If you receive one shock and you do not feel well, call Emergency Medical Services. They will take you to the nearest emergency room.   If you have a defibrillator and you get more than one shock from the device or multiple shocks in a short period of time: Call Emergency Medical Services. They will take you to the nearest emergency room.   Appliances: You may operate any electrical device in your home, including microwaves.   Security Systems: Electromagnetic security systems can be located in the workplace, courthouses, or other  high-security areas. Exposure to this type of security system has been shown to cause interference in some cases. Interference may be related to the duration of exposure and/or the distance between the device and the security device. You should be aware of the location of security systems, move through them at a normal pace, and avoid leaning or standing too close.   Metal Detectors at Airports: Metal detectors at airports can potentially interfere with pacemakers or defibrillators, although this is unlikely. Metal detectors will likely be triggered by your device and therefore at places such as airports  it will be important for you to carry your identification card for the pacemaker/defibrillator. Airport personnel will likely prefer to do a manual search.   Cellular Phones: It is unlikely that using a cellular phone will interfere with your device. It should be used with the hand opposite to the side where your device was implanted. The phone should not be carried in the shirt pocket on the same side as the device.   Specific Work Conditions: Patients who work near high-voltage lines, transmitting towers, large motors, welding equipment, or powerful magnets should discuss their specific situation with their physician. In general, remain at least two feet from external electrical equipment, verify that the equipment is properly grounded, and wear insulated gloves when using electrical devices. Leave the immediate location if lightheadedness or other symptoms develop.   MRI: Some pacemakers and defibrillators are safe in MRI scanners, while others are not. Please consult with your physician to see if you have an MRI-compatible device.   Surgery: Should you require surgery in the future, some electrosurgical devices can interfere with your device function. You should discuss this with your surgeon before any operation.   Radiation Therapy: If you ever require radiation therapy in the future, care must be taken  to avoid irradiating the device.  Long-Term Follow-Up   Your device has the ability to transmit device information from home to the doctors office using a home monitoring system.   This remote system takes the place of a doctors visit. Your device will be checked from home every 3-6 months. Every 6-12 months, you will be asked to come into the office for an in-office check.   Your device should last in the range of 6-12 years. This depends on many factors including how often it paces the heart.   When the battery is low, a generator change will be performed. This is a same-day procedure with no post-op activity restrictions, unless one of the pacemaker or defibrillator leads needs to be replaced at that time, or a new lead is added to your existing system.

## 2018-04-16 ENCOUNTER — PATIENT MESSAGE (OUTPATIENT)
Dept: MEDSURG UNIT | Facility: HOSPITAL | Age: 63
End: 2018-04-16

## 2018-04-18 ENCOUNTER — HOSPITAL ENCOUNTER (OUTPATIENT)
Dept: RADIOLOGY | Facility: HOSPITAL | Age: 63
Discharge: HOME | End: 2018-04-18
Attending: FAMILY MEDICINE
Payer: COMMERCIAL

## 2018-04-18 VITALS — HEIGHT: 62 IN | WEIGHT: 171 LBS | BODY MASS INDEX: 31.47 KG/M2

## 2018-04-18 DIAGNOSIS — C50.919 BREAST CANCER: ICD-10-CM

## 2018-04-18 DIAGNOSIS — Z12.39 BREAST CANCER SCREENING: ICD-10-CM

## 2018-04-18 PROCEDURE — 77066 DX MAMMO INCL CAD BI: CPT | Mod: 26,,, | Performed by: RADIOLOGY

## 2018-04-18 PROCEDURE — 77062 BREAST TOMOSYNTHESIS BI: CPT | Mod: 26,,, | Performed by: RADIOLOGY

## 2018-04-18 PROCEDURE — 77066 DX MAMMO INCL CAD BI: CPT | Mod: TC

## 2018-04-19 ENCOUNTER — TELEPHONE (OUTPATIENT)
Dept: ELECTROPHYSIOLOGY | Facility: CLINIC | Age: 63
End: 2018-04-19

## 2018-04-19 ENCOUNTER — PATIENT MESSAGE (OUTPATIENT)
Dept: ELECTROPHYSIOLOGY | Facility: CLINIC | Age: 63
End: 2018-04-19

## 2018-04-19 NOTE — TELEPHONE ENCOUNTER
IMPLANTABLE DEVICE EDUCATION CHECKLIST       5/2/18 @ 5:30 AM REPORT TO CARDIOLOGY WAITING ROOM ON 3RD FLOOR OF HOSPITAL      WASH YOUR CHEST WITH HIBICLENS OR AN ANTIBACTERIAL SOAP (SUCH AS DIAL) ON THE NIGHT BEFORE AND THE MORNING OF YOUR PROCEDURE.     DO NOT EAT OR DRINK ANYTHING AFTER: 12 MN ON THE NIGHT BEFORE YOUR PROCEDURE     MEDICATIONS  HOLD YOUR DIABETES MEDICATION: METFORMIN (GLUCOPHAGE) ON THE MORNING OF YOUR PROCEDURE  HOLD YOUR FLUID PILL: LASIX (FUROSEMIDE) ON THE MORNING OF YOUR PROCEDURE  YOU MAY TAKE YOUR OTHER USUAL MORNING MEDICATIONS WITH A SIP OF WATER     DIRECTIONS TO CARDIOLOGY WAITING ROOM  If you park in the Parking Garage:  Take elevators to the 2nd floor  Walk up ramp and turn right by [address] Ele[address]ators  Take elevator to the 3rd floor  Upon exiting the elevator, turn away from the clinic areas  Walk long [address] around to front of hospital to area with windows overlooking [facility]  Check in at Reception Desk  OR  If family is dropping you off:  Have them drop you off at the front of the Hospital  (Near the ER, where all the flags are hung).  Take the E elevators to the 3rd floor.  Check in at the Reception Desk in the waiting room.     YOU WILL BE SPENDING THE NIGHT AFTER YOUR PROCEDURE  YOU WILL NEED SOMEONE TO DRIVE YOU HOME THE DAY AFTER YOUR PROCEDURE     YOUR PAIN DURING YOUR PROCEDURE WILL BE MANAGED BY THE ANESTHESIA TEAM     Any need to reschedule or cancel procedures, or any questions regarding your procedures should be addressed directly with the Arrhythmia Department Nurses at the following phone number: [phone]     THE ABOVE INSTRUCTIONS WERE GIVEN TO THE PATIENT VERBALLY AND THEY VERBALIZED UNDERSTANDING. THEY DO NOT REQUIRE ANY SPECIAL NEEDS AND DO NOT HAVE ANY LEARNING BARRIERS.

## 2018-04-30 ENCOUNTER — TELEPHONE (OUTPATIENT)
Dept: ELECTROPHYSIOLOGY | Facility: CLINIC | Age: 63
End: 2018-04-30

## 2018-04-30 NOTE — TELEPHONE ENCOUNTER
Left message for pt reviewing instructions for procedure on Wed, May 2, 2018. Reminded NPO after MN, hold Metformin and Lasix in am, wash with hibiclens night before and am of procedure.  Instructed to call back with any questions.

## 2018-05-01 ENCOUNTER — ANESTHESIA EVENT (OUTPATIENT)
Dept: MEDSURG UNIT | Facility: HOSPITAL | Age: 63
End: 2018-05-01
Payer: COMMERCIAL

## 2018-05-01 ENCOUNTER — TELEPHONE (OUTPATIENT)
Dept: ELECTROPHYSIOLOGY | Facility: CLINIC | Age: 63
End: 2018-05-01

## 2018-05-01 NOTE — TELEPHONE ENCOUNTER
Verified appts for arrival in am with pt and reviewed instructions for procedure. Understanding verbalized.  'r

## 2018-05-02 ENCOUNTER — HOSPITAL ENCOUNTER (OUTPATIENT)
Facility: HOSPITAL | Age: 63
Discharge: HOME | End: 2018-05-03
Attending: INTERNAL MEDICINE | Admitting: INTERNAL MEDICINE
Payer: COMMERCIAL

## 2018-05-02 ENCOUNTER — ANESTHESIA (OUTPATIENT)
Dept: MEDSURG UNIT | Facility: HOSPITAL | Age: 63
End: 2018-05-02
Payer: COMMERCIAL

## 2018-05-02 DIAGNOSIS — I42.8 OTHER CARDIOMYOPATHIES: ICD-10-CM

## 2018-05-02 DIAGNOSIS — I49.9 ARRHYTHMIA: ICD-10-CM

## 2018-05-02 DIAGNOSIS — Z95.810 PRESENCE OF AUTOMATIC IMPLANTABLE CARDIOVERTER-DEFIBRILLATOR: ICD-10-CM

## 2018-05-02 DIAGNOSIS — I42.0 DILATED CARDIOMYOPATHY: Primary | ICD-10-CM

## 2018-05-02 DIAGNOSIS — I44.7 LEFT BUNDLE-BRANCH BLOCK: ICD-10-CM

## 2018-05-02 DIAGNOSIS — I42.8 NICM (NONISCHEMIC CARDIOMYOPATHY): ICD-10-CM

## 2018-05-02 LAB
POCT GLUCOSE: 101 MG/DL (ref 70–110)
POCT GLUCOSE: 106 MG/DL (ref 70–110)
POCT GLUCOSE: 123 MG/DL (ref 70–110)
POCT GLUCOSE: 125 MG/DL (ref 70–110)
POCT GLUCOSE: 152 MG/DL (ref 70–110)

## 2018-05-02 PROCEDURE — 37000009 HC ANESTHESIA EA ADD 15 MINS: Performed by: INTERNAL MEDICINE

## 2018-05-02 PROCEDURE — 93010 ELECTROCARDIOGRAM REPORT: CPT | Mod: ,,, | Performed by: INTERNAL MEDICINE

## 2018-05-02 PROCEDURE — 25500020 PHARM REV CODE 255

## 2018-05-02 PROCEDURE — 33264 RMVL & RPLCMT DFB GEN MLT LD: CPT | Mod: ,,, | Performed by: INTERNAL MEDICINE

## 2018-05-02 PROCEDURE — 37000008 HC ANESTHESIA 1ST 15 MINUTES: Performed by: INTERNAL MEDICINE

## 2018-05-02 PROCEDURE — 94799 UNLISTED PULMONARY SVC/PX: CPT

## 2018-05-02 PROCEDURE — 94640 AIRWAY INHALATION TREATMENT: CPT

## 2018-05-02 PROCEDURE — 33225 L VENTRIC PACING LEAD ADD-ON: CPT | Mod: ,,, | Performed by: INTERNAL MEDICINE

## 2018-05-02 PROCEDURE — 63600175 PHARM REV CODE 636 W HCPCS

## 2018-05-02 PROCEDURE — D9220A PRA ANESTHESIA: Mod: ANES,,, | Performed by: ANESTHESIOLOGY

## 2018-05-02 PROCEDURE — 63600175 PHARM REV CODE 636 W HCPCS: Performed by: INTERNAL MEDICINE

## 2018-05-02 PROCEDURE — 93284 PRGRMG EVAL IMPLANTABLE DFB: CPT

## 2018-05-02 PROCEDURE — 25000003 PHARM REV CODE 250: Performed by: NURSE ANESTHETIST, CERTIFIED REGISTERED

## 2018-05-02 PROCEDURE — 25000003 PHARM REV CODE 250: Performed by: INTERNAL MEDICINE

## 2018-05-02 PROCEDURE — 94761 N-INVAS EAR/PLS OXIMETRY MLT: CPT

## 2018-05-02 PROCEDURE — 82962 GLUCOSE BLOOD TEST: CPT | Performed by: INTERNAL MEDICINE

## 2018-05-02 PROCEDURE — 99900035 HC TECH TIME PER 15 MIN (STAT)

## 2018-05-02 PROCEDURE — 82962 GLUCOSE BLOOD TEST: CPT

## 2018-05-02 PROCEDURE — 27200196 EP LAB PROCEDURE

## 2018-05-02 PROCEDURE — 93284 PRGRMG EVAL IMPLANTABLE DFB: CPT | Mod: 26,,, | Performed by: INTERNAL MEDICINE

## 2018-05-02 PROCEDURE — 25500020 PHARM REV CODE 255: Performed by: NURSE ANESTHETIST, CERTIFIED REGISTERED

## 2018-05-02 PROCEDURE — 63600175 PHARM REV CODE 636 W HCPCS: Performed by: NURSE ANESTHETIST, CERTIFIED REGISTERED

## 2018-05-02 PROCEDURE — 25000242 PHARM REV CODE 250 ALT 637 W/ HCPCS: Performed by: INTERNAL MEDICINE

## 2018-05-02 PROCEDURE — 25000003 PHARM REV CODE 250

## 2018-05-02 PROCEDURE — 27000221 HC OXYGEN, UP TO 24 HOURS

## 2018-05-02 PROCEDURE — D9220A PRA ANESTHESIA: Mod: CRNA,,, | Performed by: NURSE ANESTHETIST, CERTIFIED REGISTERED

## 2018-05-02 PROCEDURE — 93010 ELECTROCARDIOGRAM REPORT: CPT | Mod: 76,,, | Performed by: INTERNAL MEDICINE

## 2018-05-02 PROCEDURE — A4216 STERILE WATER/SALINE, 10 ML: HCPCS | Performed by: NURSE ANESTHETIST, CERTIFIED REGISTERED

## 2018-05-02 PROCEDURE — 93005 ELECTROCARDIOGRAM TRACING: CPT

## 2018-05-02 RX ORDER — MIDAZOLAM HYDROCHLORIDE 1 MG/ML
INJECTION, SOLUTION INTRAMUSCULAR; INTRAVENOUS
Status: DISCONTINUED | OUTPATIENT
Start: 2018-05-02 | End: 2018-05-02

## 2018-05-02 RX ORDER — GLUCAGON 1 MG
1 KIT INJECTION
Status: DISCONTINUED | OUTPATIENT
Start: 2018-05-02 | End: 2018-05-03 | Stop reason: HOSPADM

## 2018-05-02 RX ORDER — METFORMIN HYDROCHLORIDE 500 MG/1
500 TABLET ORAL 2 TIMES DAILY
Status: DISCONTINUED | OUTPATIENT
Start: 2018-05-02 | End: 2018-05-03 | Stop reason: HOSPADM

## 2018-05-02 RX ORDER — CARVEDILOL 25 MG/1
25 TABLET ORAL 2 TIMES DAILY
Status: DISCONTINUED | OUTPATIENT
Start: 2018-05-02 | End: 2018-05-03 | Stop reason: HOSPADM

## 2018-05-02 RX ORDER — CETIRIZINE HYDROCHLORIDE 10 MG/1
10 TABLET ORAL NIGHTLY
Status: DISCONTINUED | OUTPATIENT
Start: 2018-05-02 | End: 2018-05-03 | Stop reason: HOSPADM

## 2018-05-02 RX ORDER — KETAMINE HYDROCHLORIDE 100 MG/ML
INJECTION, SOLUTION INTRAMUSCULAR; INTRAVENOUS
Status: DISCONTINUED | OUTPATIENT
Start: 2018-05-02 | End: 2018-05-02

## 2018-05-02 RX ORDER — ALBUTEROL SULFATE 90 UG/1
2 AEROSOL, METERED RESPIRATORY (INHALATION) EVERY 6 HOURS PRN
Status: DISCONTINUED | OUTPATIENT
Start: 2018-05-02 | End: 2018-05-03 | Stop reason: HOSPADM

## 2018-05-02 RX ORDER — SODIUM CHLORIDE 0.9 % (FLUSH) 0.9 %
3 SYRINGE (ML) INJECTION
Status: DISCONTINUED | OUTPATIENT
Start: 2018-05-02 | End: 2018-05-03 | Stop reason: HOSPADM

## 2018-05-02 RX ORDER — INSULIN ASPART 100 [IU]/ML
0-5 INJECTION, SOLUTION INTRAVENOUS; SUBCUTANEOUS
Status: DISCONTINUED | OUTPATIENT
Start: 2018-05-02 | End: 2018-05-03 | Stop reason: HOSPADM

## 2018-05-02 RX ORDER — SERTRALINE HYDROCHLORIDE 50 MG/1
100 TABLET, FILM COATED ORAL EVERY MORNING
Status: DISCONTINUED | OUTPATIENT
Start: 2018-05-02 | End: 2018-05-02

## 2018-05-02 RX ORDER — CEFAZOLIN SODIUM 1 G/3ML
2 INJECTION, POWDER, FOR SOLUTION INTRAMUSCULAR; INTRAVENOUS
Status: COMPLETED | OUTPATIENT
Start: 2018-05-02 | End: 2018-05-03

## 2018-05-02 RX ORDER — IODIXANOL 320 MG/ML
INJECTION, SOLUTION INTRAVASCULAR
Status: DISCONTINUED | OUTPATIENT
Start: 2018-05-02 | End: 2018-05-02

## 2018-05-02 RX ORDER — ACETAMINOPHEN 325 MG/1
650 TABLET ORAL EVERY 4 HOURS PRN
Status: DISCONTINUED | OUTPATIENT
Start: 2018-05-02 | End: 2018-05-03 | Stop reason: HOSPADM

## 2018-05-02 RX ORDER — FUROSEMIDE 40 MG/1
40 TABLET ORAL 2 TIMES DAILY
Status: DISCONTINUED | OUTPATIENT
Start: 2018-05-02 | End: 2018-05-03 | Stop reason: HOSPADM

## 2018-05-02 RX ORDER — IBUPROFEN 200 MG
16 TABLET ORAL
Status: DISCONTINUED | OUTPATIENT
Start: 2018-05-02 | End: 2018-05-03 | Stop reason: HOSPADM

## 2018-05-02 RX ORDER — IPRATROPIUM BROMIDE AND ALBUTEROL SULFATE 2.5; .5 MG/3ML; MG/3ML
3 SOLUTION RESPIRATORY (INHALATION) EVERY 6 HOURS PRN
Status: DISCONTINUED | OUTPATIENT
Start: 2018-05-02 | End: 2018-05-03 | Stop reason: HOSPADM

## 2018-05-02 RX ORDER — IBUPROFEN 200 MG
24 TABLET ORAL
Status: DISCONTINUED | OUTPATIENT
Start: 2018-05-02 | End: 2018-05-03 | Stop reason: HOSPADM

## 2018-05-02 RX ORDER — DEXMEDETOMIDINE HYDROCHLORIDE 100 UG/ML
INJECTION, SOLUTION INTRAVENOUS
Status: DISCONTINUED | OUTPATIENT
Start: 2018-05-02 | End: 2018-05-02

## 2018-05-02 RX ORDER — CETIRIZINE HYDROCHLORIDE 10 MG/1
10 TABLET ORAL DAILY
Status: DISCONTINUED | OUTPATIENT
Start: 2018-05-02 | End: 2018-05-02

## 2018-05-02 RX ORDER — SERTRALINE HYDROCHLORIDE 50 MG/1
100 TABLET, FILM COATED ORAL NIGHTLY
Status: DISCONTINUED | OUTPATIENT
Start: 2018-05-02 | End: 2018-05-03 | Stop reason: HOSPADM

## 2018-05-02 RX ORDER — CEFAZOLIN SODIUM 1 G/3ML
2 INJECTION, POWDER, FOR SOLUTION INTRAMUSCULAR; INTRAVENOUS
Status: COMPLETED | OUTPATIENT
Start: 2018-05-02 | End: 2018-05-02

## 2018-05-02 RX ADMIN — SODIUM CHLORIDE, SODIUM GLUCONATE, SODIUM ACETATE, POTASSIUM CHLORIDE, MAGNESIUM CHLORIDE, SODIUM PHOSPHATE, DIBASIC, AND POTASSIUM PHOSPHATE: .53; .5; .37; .037; .03; .012; .00082 INJECTION, SOLUTION INTRAVENOUS at 08:05

## 2018-05-02 RX ADMIN — ACETAMINOPHEN 650 MG: 325 TABLET ORAL at 09:05

## 2018-05-02 RX ADMIN — DEXMEDETOMIDINE HYDROCHLORIDE 0.7 MCG: 100 INJECTION, SOLUTION, CONCENTRATE INTRAVENOUS at 08:05

## 2018-05-02 RX ADMIN — IPRATROPIUM BROMIDE AND ALBUTEROL SULFATE 3 ML: .5; 3 SOLUTION RESPIRATORY (INHALATION) at 09:05

## 2018-05-02 RX ADMIN — FUROSEMIDE 40 MG: 40 TABLET ORAL at 06:05

## 2018-05-02 RX ADMIN — IPRATROPIUM BROMIDE AND ALBUTEROL SULFATE 3 ML: .5; 3 SOLUTION RESPIRATORY (INHALATION) at 01:05

## 2018-05-02 RX ADMIN — CEFAZOLIN 2 G: 330 INJECTION, POWDER, FOR SOLUTION INTRAMUSCULAR; INTRAVENOUS at 08:05

## 2018-05-02 RX ADMIN — IODIXANOL 10 ML: 320 INJECTION, SOLUTION INTRAVASCULAR at 08:05

## 2018-05-02 RX ADMIN — DEXMEDETOMIDINE HYDROCHLORIDE 77 MCG: 100 INJECTION, SOLUTION, CONCENTRATE INTRAVENOUS at 08:05

## 2018-05-02 RX ADMIN — METFORMIN HYDROCHLORIDE 500 MG: 500 TABLET, FILM COATED ORAL at 01:05

## 2018-05-02 RX ADMIN — CEFAZOLIN 2 G: 330 INJECTION, POWDER, FOR SOLUTION INTRAMUSCULAR; INTRAVENOUS at 04:05

## 2018-05-02 RX ADMIN — MIDAZOLAM 2 MG: 1 INJECTION INTRAMUSCULAR; INTRAVENOUS at 08:05

## 2018-05-02 RX ADMIN — KETAMINE HYDROCHLORIDE 20 MG: 100 INJECTION, SOLUTION, CONCENTRATE INTRAMUSCULAR; INTRAVENOUS at 08:05

## 2018-05-02 NOTE — PLAN OF CARE
Pt received from PACU via stretcher.Vs stable. C/O pain to Left shoulder 4-10. Incision with Aqua seal dressing clean dry and intact.Family called to room and pt oriented to room.

## 2018-05-02 NOTE — PROCEDURES
"    Post EP Procedure Note  Referring Physician: Javy Gates MD  Procedure: INSERTION-ICD-BIVENTRICULAR (N/A)       Access: left subclavian    See full report for further details    Intervention:     Unsuccessful LV lead placement. Capped epicardial lead  Successful placement of HIS lead    Post Cath Exam:   /70 (BP Location: Right arm, Patient Position: Lying)   Pulse 86   Temp 97.6 °F (36.4 °C) (Oral)   Resp 16   Ht 5' 2" (1.575 m)   Wt 77.6 kg (171 lb)   LMP  (LMP Unknown)   SpO2 (!) 93%   Breastfeeding? No   BMI 31.28 kg/m²   No unusual pain, hematoma, thrill or bruit at vascular access site.  Distal pulse present without signs of ischemia.    Recommendations:   Ancef 2 gms Q 8 till all lines are removed  Keflex 500 mgs TID for 5 days after completion of IV antibiotics  No heparin products  No lifting > 5 Lbs or lifting L arm above head for 6 weeks  Can shower in 48 hrs, avoid direct shower beam or scrubbing to the pacemaker site  Avoid driving for 1 week and for 4 weeks if patient uses left arm to make turns  Follow up in device clinic in 2 weeks and with Dr Gates in 3 months after discharge.    Signed:  Anita Abarca MD  Cardiology Fellow, PGY-5  5/2/2018 10:08 AM      "

## 2018-05-02 NOTE — PROGRESS NOTES
Admitted to S[facility]U for procedure. AAOx4. VSS. Ambulating. Pre procedural protocol reviewed. No issues reported at this time.

## 2018-05-02 NOTE — H&P (VIEW-ONLY)
Subjective:    Patient ID:  Hannah Montoya is a 62 y.o. female who presents for evaluation of Congestive Heart Failure      62 yoF NICM, LBBB s/p CRT-D here for second opinion regarding transvenous LV lead placement. She has an attempt at an LV lead 8/10/17 by Dr Lopez. The attempt was complicated by tortuous venous anatomy and inability to pass an LV lead. Venograms show mid and anterolateral branches with tortuous courses. The mid lateral branch was accessed with a 014 wire but the lead could not be advanced. She had an epicardial lead implanted however she has elevated thresholds, 5.0 V @ 1.2 ms. She has expected 1-1.5 y on her battery life giving her overall ~2 years with her current device. She has mild improvement in symptoms. QRS went from 168 to 150 ms with CRT pacing. She is here for a second opinion.     Echo 3/30/17:  CONCLUSIONS     1 - Severely depressed left ventricular systolic function (EF 20-25%).     2 - Eccentric hypertrophy.     3 - Severe left atrial enlargement.     4 - Left ventricular diastolic dysfunction.     5 - Severely depressed right ventricular systolic function .     6 - Pulmonary hypertension. The estimated PA systolic pressure is 47 mmHg.     7 - Moderate mitral regurgitation.     8 - Intermediate central venous pressure.     9 - Small pericardial effusion.     Past Medical History:  No date: Asthma      Comment: bronchitis in past  No date: Breast cancer  No date: Cardiomyopathy  No date: COPD (chronic obstructive pulmonary disease)  No date: Diabetes mellitus, type 2  No date: Hyperglycemia  No date: Hyperlipidemia  No date: Hypertension    Past Surgical History:  2016: BREAST BIOPSY Right      Comment: IDC  No date: BREAST LUMPECTOMY Right  No date: BREAST SURGERY  No date:  SECTION      Comment: x2  No date: CHOLECYSTECTOMY  No date: MASTECTOMY W/ SENTINEL NODE BIOPSY  No date: TUBAL LIGATION    Social History    Marital status:              Spouse  name:                       Years of education:                 Number of children:               Occupational History  Occupation          Employer            Comment                                   [employer]        Social History Main Topics    Smoking status: Former Smoker                                                                Packs/day: 0.50      Years: 40.00          Quit date: 8/1/2016    Smokeless tobacco: Never Used                        Alcohol use: Yes           0.0 oz/week       Comment: rare- holiday    Drug use: No              Sexual activity: Not on file          Other Topics            Concern    None on file    Social History Narrative    None on file    Review of patient's family history indicates:    Kidney disease                 Mother                    Kidney disease                 Father                    Clotting disorder              Brother                   Breast cancer                  Neg Hx                    Ovarian cancer                 Neg Hx                          Review of Systems   Constitution: Positive for weakness and malaise/fatigue.   HENT: Negative.    Eyes: Negative.    Cardiovascular: Positive for dyspnea on exertion. Negative for chest pain, leg swelling, near-syncope, palpitations and syncope.   Respiratory: Negative.  Negative for shortness of breath.    Endocrine: Negative.    Hematologic/Lymphatic: Negative.    Skin: Negative.    Musculoskeletal: Negative.    Gastrointestinal: Negative.    Genitourinary: Negative.    Neurological: Negative for dizziness and light-headedness.   Psychiatric/Behavioral: Negative.    Allergic/Immunologic: Negative.         Objective:    Physical Exam   Constitutional: She is oriented to person, place, and time. She appears well-developed and well-nourished. No distress.   HENT:   Head: Normocephalic and atraumatic.   Eyes: EOM are normal. Pupils are equal, round, and reactive to light. Right eye exhibits no  discharge. Left eye exhibits no discharge.   Neck: Normal range of motion. No JVD present. No thyromegaly present.   Cardiovascular: Normal rate, regular rhythm, S1 normal, S2 normal and normal heart sounds.  PMI is not displaced.  Exam reveals no gallop and no friction rub.    No murmur heard.  Pulmonary/Chest: Effort normal and breath sounds normal. No respiratory distress. She has no wheezes. She has no rales.   L upper chest incision with underlying generator   Abdominal: Soft. Bowel sounds are normal. She exhibits no distension. There is no tenderness. There is no rebound and no guarding.   Musculoskeletal: Normal range of motion. She exhibits no edema or tenderness.   Neurological: She is alert and oriented to person, place, and time. No cranial nerve deficit.   Skin: Skin is warm and dry. No rash noted. She is not diaphoretic. No erythema.   Psychiatric: She has a normal mood and affect. Her behavior is normal. Judgment and thought content normal.   Vitals reviewed.    ECg: NSR, CRT paced  ms        Assessment:       1. Left bundle-branch block    2. Dilated cardiomyopathy         Plan:       62 yoF NICM, LBBB s/p CRT-D here for second opinion regarding her LV lead. Her current LV lead is failing with elevated thresholds and significant battery drain. I offered another attempt at a transvenous route. Extensive discussion regarding risks, benefits, and alternative approaches to the procedure was had with the patient.  The patient voices understanding and all questions have been answered.  The patient would like to proceed as planned.    Transvenous LV lead attempt  Anesthesia, MAC fine  Keep MDT as the vendor  Prepare for CS venoplasty if needed

## 2018-05-02 NOTE — INTERVAL H&P NOTE
The patient has been examined and the H&P has been reviewed:    I concur with the findings and no changes have occurred since H&P was written.    Anesthesia/Surgery risks, benefits and alternative options discussed and understood by patient/family. Proceed with LV lead placement          Active Hospital Problems    Diagnosis  POA    NICM (nonischemic cardiomyopathy) [I42.8]  Yes      Resolved Hospital Problems    Diagnosis Date Resolved POA   No resolved problems to display.

## 2018-05-02 NOTE — TRANSFER OF CARE
"Anesthesia Transfer of Care Note    Patient: Hannah Montoya    Procedure(s) Performed: Procedure(s) (LRB):  INSERTION-ICD-BIVENTRICULAR (N/A)    Patient location: PACU    Anesthesia Type: general    Transport from OR: Transported from OR on 6-10 L/min O2 by face mask with adequate spontaneous ventilation    Post pain: adequate analgesia    Post assessment: no apparent anesthetic complications    Post vital signs: stable    Level of consciousness: awake and lethargic    Nausea/Vomiting: no nausea/vomiting    Complications: none    Transfer of care protocol was followed      Last vitals:   Visit Vitals  BP (!) 95/51 (BP Location: Right arm, Patient Position: Lying)   Pulse 75   Temp 36.6 °C (97.9 °F) (Temporal)   Resp 20   Ht 5' 2" (1.575 m)   Wt 77.6 kg (171 lb)   LMP  (LMP Unknown)   SpO2 98%   Breastfeeding? No   BMI 31.28 kg/m²     "

## 2018-05-02 NOTE — ANESTHESIA PREPROCEDURE EVALUATION
05/02/2018  Pre-operative evaluation for Procedure(s) (LRB):  INSERTION-ICD-BIVENTRICULAR (N/A)    Hannah Montoya is a 62 y.o. female mod COPD, NICM (EF 25%), DMII, HTN.     Patient Active Problem List   Diagnosis    Essential hypertension    COPD (chronic obstructive pulmonary disease)    Tobacco abuse    Malignant neoplasm of overlapping sites of female breast    Breast CA    Acute respiratory failure with hypoxia and hypercapnia    Mild protein malnutrition    Elevated troponin    Type 2 diabetes mellitus without complication    Seasonal allergies    Dehydration    Acute systolic congestive heart failure    Left bundle-branch block    Dilated cardiomyopathy    NICM (nonischemic cardiomyopathy)       Review of patient's allergies indicates:   Allergen Reactions    Adhesive Rash       No current facility-administered medications on file prior to encounter.      Current Outpatient Prescriptions on File Prior to Encounter   Medication Sig Dispense Refill    albuterol (PROAIR HFA) 90 mcg/actuation inhaler Inhale 2 puffs into the lungs every 6 (six) hours as needed. Rescue 17 g 5    albuterol-ipratropium 2.5mg-0.5mg/3mL (DUO-NEB) 0.5 mg-3 mg(2.5 mg base)/3 mL nebulizer solution Take 3 mLs by nebulization every 6 (six) hours as needed for Wheezing. Rescue 1 Box 3    carvedilol (COREG) 25 MG tablet TAKE 1 TABLET(25 MG) BY MOUTH TWICE DAILY 60 tablet 6    cetirizine (ZYRTEC) 10 MG tablet Take 10 mg by mouth once daily.      furosemide (LASIX) 40 MG tablet TAKE 1 TABLET(40 MG) BY MOUTH TWICE DAILY 60 tablet 6    metFORMIN (GLUCOPHAGE) 500 MG tablet TAKE ONE Tablet BY MOUTH TWICE DAILY 180 tablet 3    PULMICORT FLEXHALER 180 mcg/actuation AePB INHALE 2 PUFFS BY MOUTH TWICE DAILY 1 each 6    sertraline (ZOLOFT) 100 MG tablet TAKE ONE TABLET BY MOUTH EVERY MORNING 90 tablet 1     azelastine (ASTELIN) 137 mcg (0.1 %) nasal spray 1 spray (137 mcg total) by Nasal route 2 (two) times daily. 30 mL 0    TRUETEST TEST STRIPS Strp test once EVERY MORNING 90 each 3       Past Surgical History:   Procedure Laterality Date    BREAST BIOPSY Right 2016    IDC    BREAST LUMPECTOMY Right      SECTION      x2    CHOLECYSTECTOMY      TUBAL LIGATION         Social History     Social History    Marital status:      Spouse name: N/A    Number of children: N/A    Years of education: N/A     Occupational History     [employer]     Social History Main Topics    Smoking status: Former Smoker     Packs/day: 0.50     Years: 40.00     Quit date: 2016    Smokeless tobacco: Never Used    Alcohol use 0.0 oz/week      Comment: rare- holiday    Drug use: No    Sexual activity: Not on file     Other Topics Concern    Not on file     Social History Narrative    No narrative on file         CBC: No results for input(s): WBC, RBC, HGB, HCT, PLT, MCV, MCH, MCHC in the last 72 hours.    CMP: No results for input(s): NA, K, CL, CO2, BUN, CREATININE, GLU, MG, PHOS, CALCIUM, ALBUMIN, PROT, ALKPHOS, ALT, AST, BILITOT in the last 72 hours.    INR  No results for input(s): PT, INR, PROTIME, APTT in the last 72 hours.        Diagnostic Studies:      EKD Echo:  Results for orders placed or performed during the hospital encounter of 17   2D echo with color flow doppler   Result Value Ref Range    EF 15 (A) 55 - 65    Mitral Valve Regurgitation MILD     Diastolic Dysfunction Yes (A)     Est. PA Systolic Pressure 44.99 (A)     Pericardial Effusion NONE     Tricuspid Valve Regurgitation MILD          Anesthesia Evaluation    I have reviewed the Patient Summary Reports.     I have reviewed the Medications.     Review of Systems  Anesthesia Hx:  History of prior surgery of interest to airway management or planning: Denies Family Hx of Anesthesia complications.   Denies Personal Hx of  Anesthesia complications.       Physical Exam  General:  Obesity    Airway/Jaw/Neck:  Airway Findings: Mouth Opening: Normal Tongue: Normal  General Airway Assessment: Adult  Mallampati: II  TM Distance: Normal, at least 6 cm  Jaw/Neck Findings:  Neck ROM: Normal ROM      Dental:  Dental Findings: In tact   Chest/Lungs:  Chest/Lungs Findings: Clear to auscultation, Normal Respiratory Rate         Mental Status:  Mental Status Findings:  Cooperative, Alert and Oriented         Anesthesia Plan  Type of Anesthesia, risks & benefits discussed:  Anesthesia Type:  general  Patient's Preference:   Intra-op Monitoring Plan: standard ASA monitors  Intra-op Monitoring Plan Comments:   Post Op Pain Control Plan: multimodal analgesia  Post Op Pain Control Plan Comments:   Induction:   IV  Beta Blocker:  Patient is on a Beta-Blocker and has received one dose within the past 24 hours (No further documentation required).       Informed Consent: Patient understands risks and agrees with Anesthesia plan.  Questions answered. Anesthesia consent signed with patient.  ASA Score: 4     Day of Surgery Review of History & Physical:    H&P update referred to the surgeon.         Ready For Surgery From Anesthesia Perspective.

## 2018-05-02 NOTE — PLAN OF CARE
Vss. sats 95% on 2l nc. Aaox4.  Left chest wall incision with foam drsg, ice pack and left arm sling in place. Pt denies pain.  See flowsheet for full assessment. Pt's family updated.

## 2018-05-02 NOTE — SIGNIFICANT EVENT
Was called due to her QRS widening back up on telemetry. CXR ordered and device interrogated. Telemetry with pacing spikes that aren't capturing. CXR identified HIS lead has dislodged. Removed sling and turned off LV lead.    Old settings:  Mode: DDD   CRT, paced , sensed to 90    Switched Device Setting:  Mode: DDD   V pacing only, paced AV extended to 300, sensed to 260  Turned of PVC response and RV sense response    Anita Abarca MD  Cardiology PGY5

## 2018-05-02 NOTE — NURSING TRANSFER
Nursing Transfer Note      5/2/2018     Transfer To: ep pacu 3 to 314 sscu     Transfer via stretcher    Transfer with cardiac monitoring, tele box 314, 2l nc portable oxgyen    Transported by florence coley    Medicines sent: none    Chart send with patient: Yes    Notified: daughter, son    Patient reassessed at: 5/2/18 1130    Upon arrival to floor: cardiac monitor applied, patient oriented to room, call bell in reach and bed in lowest position, 2l nc connected to wall oxygen.

## 2018-05-03 VITALS
TEMPERATURE: 98 F | BODY MASS INDEX: 31.47 KG/M2 | WEIGHT: 171 LBS | DIASTOLIC BLOOD PRESSURE: 66 MMHG | HEIGHT: 62 IN | HEART RATE: 65 BPM | RESPIRATION RATE: 17 BRPM | SYSTOLIC BLOOD PRESSURE: 139 MMHG | OXYGEN SATURATION: 95 %

## 2018-05-03 PROCEDURE — 25000242 PHARM REV CODE 250 ALT 637 W/ HCPCS: Performed by: INTERNAL MEDICINE

## 2018-05-03 PROCEDURE — 63600175 PHARM REV CODE 636 W HCPCS: Performed by: INTERNAL MEDICINE

## 2018-05-03 RX ORDER — CEPHALEXIN 500 MG/1
500 CAPSULE ORAL EVERY 8 HOURS
Qty: 15 CAPSULE | Refills: 0 | Status: SHIPPED | OUTPATIENT
Start: 2018-05-03 | End: 2018-05-08

## 2018-05-03 RX ADMIN — IPRATROPIUM BROMIDE AND ALBUTEROL SULFATE 3 ML: .5; 3 SOLUTION RESPIRATORY (INHALATION) at 10:05

## 2018-05-03 RX ADMIN — CEFAZOLIN 2 G: 330 INJECTION, POWDER, FOR SOLUTION INTRAMUSCULAR; INTRAVENOUS at 12:05

## 2018-05-03 NOTE — NURSING
IV d/c'd with cath tip intact.  Pt and daughter verbalized understanding of discharge instructions.  VSS.  Incision to left upper chest wall covered with dermabond clean dry and intact. Trf off unit via wheelchair for discharge home.

## 2018-05-03 NOTE — NURSING
Awake and alert.  Sitting in chair at bedside.  Denies pain or SOB.  Dressing to left upper chest wall clean dry and intact.  No bleeding or hematoma noted.  VSS.  Will continue to monitor.

## 2018-05-03 NOTE — PLAN OF CARE
Problem: Patient Care Overview  Goal: Plan of Care Review  Outcome: Ongoing (interventions implemented as appropriate)  Pt upset that lead is dislodged.  Okay to remove sling  per Dr Mitch WAN chest dressing remains CDI, no bleeding or hematoma noted.  Ice applied.  Will cont to monitor

## 2018-05-03 NOTE — ASSESSMENT & PLAN NOTE
--patient no longer needs to wear the sling  --5 days of oral keflex  --will reschedule procedure.

## 2018-05-03 NOTE — SUBJECTIVE & OBJECTIVE
Interval History: Patient feels well this morning. She is wondering when she can undergo the procedure again.    Review of Systems   Constitution: Negative for chills, fever and malaise/fatigue.   HENT: Negative.    Cardiovascular: Negative for chest pain, dyspnea on exertion, irregular heartbeat, leg swelling, orthopnea, palpitations and paroxysmal nocturnal dyspnea.   Respiratory: Negative for shortness of breath and wheezing.    Skin: Negative for color change and rash.   Musculoskeletal: Negative for arthritis, back pain and myalgias.   Gastrointestinal: Negative for abdominal pain, constipation, diarrhea and nausea.   Neurological: Negative for dizziness, numbness and paresthesias.   Psychiatric/Behavioral: Negative.      Objective:     Vital Signs (Most Recent):  Temp: 98.3 °F (36.8 °C) (05/03/18 0713)  Pulse: 84 (05/03/18 0719)  Resp: 18 (05/03/18 0713)  BP: 139/66 (05/03/18 0713)  SpO2: (!) 93 % (05/03/18 0713) Vital Signs (24h Range):  Temp:  [97.9 °F (36.6 °C)-99.2 °F (37.3 °C)] 98.3 °F (36.8 °C)  Pulse:  [] 84  Resp:  [14-20] 18  SpO2:  [89 %-98 %] 93 %  BP: ()/(50-67) 139/66     Weight: 77.6 kg (171 lb)  Body mass index is 31.28 kg/m².     SpO2: (!) 93 %  O2 Device (Oxygen Therapy): room air    Physical Exam   Constitutional: Vital signs are normal. She appears well-developed and well-nourished. She is cooperative.  Non-toxic appearance. No distress.   HENT:   Head: Normocephalic and atraumatic.   Neck: No hepatojugular reflux and no JVD present. Carotid bruit is not present.   Cardiovascular: Normal rate, regular rhythm, S1 normal, S2 normal and normal heart sounds.  Exam reveals no gallop.    No murmur heard.  Pulses:       Dorsalis pedis pulses are 2+ on the right side, and 2+ on the left side.        Posterior tibial pulses are 2+ on the right side, and 2+ on the left side.   No lower extremity edema   Pulmonary/Chest: Effort normal and breath sounds normal. No respiratory distress. She  has no decreased breath sounds. She has no wheezes. She has no rhonchi. She has no rales.   Abdominal: Soft. Normal appearance and bowel sounds are normal. She exhibits no distension and no mass. There is no tenderness.   Neurological: She is alert.   Skin: Skin is warm, dry and intact.       Significant Labs: All pertinent lab results from the last 24 hours have been reviewed.    Significant Imaging: Xray yesterday with HIS lead dislodged

## 2018-05-03 NOTE — DISCHARGE SUMMARY
Discharge Summary  Electrophysiology      Admit Date: 5/2/2018    Discharge Date and Time: 5/3/2018 2:49 PM    Discharge Attending Physician: Javy Gates MD    Diagnoses:  Active Hospital Problems    Diagnosis  POA    NICM (nonischemic cardiomyopathy) [I42.8]  Yes      Resolved Hospital Problems    Diagnosis Date Resolved POA   No resolved problems to display.       Discharged Condition: stable    Hospital Course:     Ms. Montoya is a 62 year old female with a past medical history of NICM LBBB s/p ICD, breast cancer, asthma, HTN, HLD who presents for LV lead upgrade. Prior attempts by other physicians had failed. We were unable to pass the LV lead either. However, we successfully placed a HIS lead to the system which improved the width of the QRS, and capped the epicardial lead. However, during the post-operative period, the HIS lead dislodged. We reprogrammed the device to shut off the HIS lead. She was discharged with a 5 day course of antibiotics and will follow up in wound clinic in 1-2 weeks. We will reschedule for another HIS lead attempt.    Consults: None    Special Treatments/Procedures: none    Disposition: Home or Self Care    Patient Instructions:      Medication List      START taking these medications    cephALEXin 500 MG capsule  Commonly known as:  KEFLEX  Take 1 capsule (500 mg total) by mouth every 8 (eight) hours.        CONTINUE taking these medications    albuterol 90 mcg/actuation inhaler  Commonly known as:  PROAIR HFA  Inhale 2 puffs into the lungs every 6 (six) hours as needed. Rescue     albuterol-ipratropium 2.5mg-0.5mg/3mL 0.5 mg-3 mg(2.5 mg base)/3 mL nebulizer solution  Commonly known as:  DUO-NEB  Take 3 mLs by nebulization every 6 (six) hours as needed for Wheezing. Rescue     azelastine 137 mcg (0.1 %) nasal spray  Commonly known as:  ASTELIN  1 spray (137 mcg total) by Nasal route 2 (two) times daily.     carvedilol 25 MG tablet  Commonly known as:  COREG  TAKE 1 TABLET(25  MG) BY MOUTH TWICE DAILY     cetirizine 10 MG tablet  Commonly known as:  ZYRTEC     furosemide 40 MG tablet  Commonly known as:  LASIX  TAKE 1 TABLET(40 MG) BY MOUTH TWICE DAILY     metFORMIN 500 MG tablet  Commonly known as:  GLUCOPHAGE  TAKE ONE Tablet BY MOUTH TWICE DAILY     PULMICORT FLEXHALER 180 mcg/actuation Aepb  Generic drug:  budesonide 180mcg  INHALE 2 PUFFS BY MOUTH TWICE DAILY     sertraline 100 MG tablet  Commonly known as:  ZOLOFT  TAKE ONE TABLET BY MOUTH EVERY MORNING     TRUETEST TEST STRIPS Strp  Generic drug:  blood sugar diagnostic  test once EVERY MORNING           Where to Get Your Medications      These medications [facility] [address] [facility] [address] [facility] [address] [address]    Phone:  [phone]   · cephALEXin 500 MG capsule           Discharge Procedure Orders  Activity as tolerated     Notify your health care provider if you experience any of the following:  temperature >100.4     Notify your health care provider if you experience any of the following:  severe uncontrolled pain     Notify your health care provider if you experience any of the following:  persistent nausea and vomiting or diarrhea     Notify your health care provider if you experience any of the following:  redness, tenderness, or signs of infection (pain, swelling, redness, odor or green/yellow discharge around incision site)     Notify your health care provider if you experience any of the following:  difficulty breathing or increased cough     Notify your health care provider if you experience any of the following:  worsening rash         Future Appointments  Date Time Provider Department Center   5/[facility]/2018 2:20 [facility]   6/13/2018 1:00 PM Elder Levine MD [facility]

## 2018-05-03 NOTE — ANESTHESIA POSTPROCEDURE EVALUATION
"Anesthesia Post Evaluation    Patient: Hannah Montoya    Procedure(s) Performed: Procedure(s) (LRB):  INSERTION-ICD-BIVENTRICULAR (N/A)    Final Anesthesia Type: general  Patient location during evaluation: Cath Lab  Patient participation: Yes- Able to Participate  Level of consciousness: awake and alert and oriented  Post-procedure vital signs: reviewed and stable  Pain management: adequate  Airway patency: patent  PONV status at discharge: No PONV  Anesthetic complications: no      Cardiovascular status: hemodynamically stable  Respiratory status: unassisted  Hydration status: euvolemic  Follow-up not needed.        Visit Vitals  /66 (Patient Position: Lying)   Pulse 84   Temp 36.8 °C (98.3 °F) (Oral)   Resp 18   Ht 5' 2" (1.575 m)   Wt 77.6 kg (171 lb)   LMP  (LMP Unknown)   SpO2 (!) 93%   Breastfeeding? No   BMI 31.28 kg/m²       Pain/James Score: Pain Assessment Performed: Yes (5/3/2018  6:00 AM)  Presence of Pain: denies (5/3/2018  6:00 AM)  Pain Rating Prior to Med Admin: 4 (5/2/2018  9:37 PM)  Pain Rating Post Med Admin: 3 (5/2/2018 10:37 PM)  James Score: 9 (5/2/2018 11:39 AM)      "

## 2018-05-09 ENCOUNTER — CLINICAL SUPPORT (OUTPATIENT)
Dept: ELECTROPHYSIOLOGY | Facility: CLINIC | Age: 63
End: 2018-05-09
Attending: INTERNAL MEDICINE
Payer: COMMERCIAL

## 2018-05-09 DIAGNOSIS — I42.0 DILATED CARDIOMYOPATHY: ICD-10-CM

## 2018-05-09 DIAGNOSIS — I44.7 LEFT BUNDLE-BRANCH BLOCK: ICD-10-CM

## 2018-05-09 PROCEDURE — 93283 PRGRMG EVAL IMPLANTABLE DFB: CPT | Mod: S$GLB,,, | Performed by: INTERNAL MEDICINE

## 2018-05-14 ENCOUNTER — TELEPHONE (OUTPATIENT)
Dept: ELECTROPHYSIOLOGY | Facility: CLINIC | Age: 63
End: 2018-05-14

## 2018-05-14 ENCOUNTER — PATIENT MESSAGE (OUTPATIENT)
Dept: ELECTROPHYSIOLOGY | Facility: CLINIC | Age: 63
End: 2018-05-14

## 2018-05-14 DIAGNOSIS — E11.9 TYPE 2 DIABETES MELLITUS WITH HEMOGLOBIN A1C GOAL OF LESS THAN 8.0%: ICD-10-CM

## 2018-05-14 DIAGNOSIS — I44.7 LEFT BUNDLE-BRANCH BLOCK: Primary | ICD-10-CM

## 2018-05-14 RX ORDER — BIOTIN 1 MG
TABLET ORAL
Qty: 100 STRIP | Refills: 1 | Status: SHIPPED | OUTPATIENT
Start: 2018-05-14 | End: 2020-11-27

## 2018-05-14 NOTE — TELEPHONE ENCOUNTER
LEAD REVISION EDUCATION CHECKLIST    PRE PROCEDURE TESTING    06/07/18 @ 9:30 AM  Pre-procedure labs have been ordered for you at:  [facility]  Please arrive at your scheduled time. You do not have to fast for this lab work!      DAY OF PROCEDURE    06/15/18 @ 5:30 AM  Report to Cardiology waiting room on 3rd floor of hospital    · WASH YOUR CHEST WITH HIBICLENS OR AN ANTIBACTERIAL SOAP (SUCH AS DIAL) ON THE NIGHT BEFORE AND THE MORNING OF YOUR PROCEDURE.  · DO NOT EAT OR DRINK ANYTHING AFTER: 12 MN ON THE NIGHT BEFORE YOUR PROCEDURE      Medications  · HOLD your Lasix and Metformin on the morning of your procedure.  · You may take your other usual morning medications with a sip of water      DIRECTIONS TO CARDIOLOGY WAITING ROOM  If you park in the Parking Garage:  Take elevators to the 2nd floor  Walk up ramp and turn right by Gold Elevators  Take elevator to the 3rd floor  Upon exiting the elevator, turn away from the clinic areas  Walk long powell around to front of hospital to area with windows overlooking [facility]  Check in at Reception Desk  OR  If family is dropping you off:  Have them drop you off at the front of the Hospital  (Near the ER, where all the flags are hung).  Take the E elevators to the 3rd floor.  Check in at the Reception Desk in the waiting room.    YOU WILL BE SPENDING THE NIGHT AFTER YOUR PROCEDURE  YOU WILL NEED SOMEONE TO DRIVE YOU HOME THE DAY AFTER YOUR PROCEDURE    YOUR PAIN DURING YOUR PROCEDURE WILL BE MANAGED BY THE ANESTHESIA TEAM    Any need to reschedule or cancel procedures, or any questions regarding your procedures should be addressed directly with the Arrhythmia Department Nurses at the following phone number: [phone]    THE ABOVE INSTRUCTIONS WERE GIVEN TO THE PATIENT VERBALLY AND THEY VERBALIZED UNDERSTANDING. THEY DO NOT REQUIRE ANY SPECIAL NEEDS AND DO NOT HAVE ANY LEARNING BARRIERS.

## 2018-05-14 NOTE — TELEPHONE ENCOUNTER
Post-Procedure Patient Discharge Instructions  Lead Revision    Wound Care   If you are discharged with a standard dressing over the incision, you may remove the dressing after 24 hours.    If you are discharged with an AQUACEL dressing, you should keep it on until your follow-up appointment in 1-2 weeks.   If there are Steri-strips (strips of tape) over your incision, leave them on until your follow-up appointment. They may begin to fall off on their own, which is normal. If there is Dermabond (clear glue) over your incision, do not scrub it off. It acts as a barrier and will eventually disappear.   You will be discharged with 5 days of oral antibiotics. Please take the full prescription until it is gone.   Do not get the incision wet for 48 hours following the procedure. You may sponge bath during this period, working around the incision. After 48 hours, you may shower, but you should still try to keep this area as dry as possible, and avoid direct water contact to the incision (allow the water to hit back of your shoulder rather than directly on the incision). Gently pat the incision dry if it does get wet.   You may take regular showers after 2 weeks, unless otherwise indicated at your follow-up visit.   Do not submerge the incision in a tub, pool, or body of water for 6 weeks.   Avoid using deodorants, powders, creams, lotions, etc. on your incision for 6 weeks.   If you notice unusual swelling, redness, drainage, have more device site pain, chest pain, shortness of breath, or have a fever greater than 100 degrees, call our device clinic immediately: [phone] or [phone] during normal office hours. You may call [phone] after-hours or on weekends and ask for the electrophysiologist on call.      Activity    If you only had a battery/generator change performed, there are no postoperative activity restrictions.    If this is your first device or if you had new wires added to your  existing device, then you will be discharged in a sling which you should wear continuously for 48 hours. After that, the sling should remain off during the day but should be worn at night for another 2-4 weeks (depending on how active a sleeper you are).   Do not raise your device-side arm above your shoulder for 6 weeks. Do not lift more than 5-10 lbs with your device-side arm for 6 weeks.    If you were driving prior to the procedure, you may resume driving after your first follow-up appointment (1-2 weeks). If you have a history of passing out or a history of certain arrhythmias, there may be driving restrictions unrelated to the procedure. Please clarify with your physician if this is the case.   No heavy activity with the affected arm for 6 weeks (eg. tennis, golf, bowling, aerobics, mowing the lawn, etc.).   Avoid rough contact at the device site for 6 weeks.   You may participate in sexual activity unless otherwise instructed.   You may return to work within 3-5 days unless told otherwise, provided you adhere to the above activity restrictions.      Long-Term Instructions   Keep your pacemaker or defibrillator identification card with you at all times.   If you have a defibrillator and you get shocked by the device: If you receive one shock and you feel ok, you may call [phone] or [phone] during normal office hours. You may call [phone] after-hours. If you receive one shock and you do not feel well, call Emergency Medical Services. They will take you to the nearest emergency room.   If you have a defibrillator and you get more than one shock from the device or multiple shocks in a short period of time: Call Emergency Medical Services. They will take you to the nearest emergency room.   Appliances: You may operate any electrical device in your home, including microwaves.   Security Systems: Electromagnetic security systems can be located in the workplace, courthouses, or other  high-security areas. Exposure to this type of security system has been shown to cause interference in some cases. Interference may be related to the duration of exposure and/or the distance between the device and the security device. You should be aware of the location of security systems, move through them at a normal pace, and avoid leaning or standing too close.   Metal Detectors at Airports: Metal detectors at airports can potentially interfere with pacemakers or defibrillators, although this is unlikely. Metal detectors will likely be triggered by your device and therefore at places such as airports  it will be important for you to carry your identification card for the pacemaker/defibrillator. Airport personnel will likely prefer to do a manual search.   Cellular Phones: It is unlikely that using a cellular phone will interfere with your device. It should be used with the hand opposite to the side where your device was implanted. The phone should not be carried in the shirt pocket on the same side as the device.   Specific Work Conditions: Patients who work near high-voltage lines, transmitting towers, large motors, welding equipment, or powerful magnets should discuss their specific situation with their physician. In general, remain at least two feet from external electrical equipment, verify that the equipment is properly grounded, and wear insulated gloves when using electrical devices. Leave the immediate location if lightheadedness or other symptoms develop.   MRI: Some pacemakers and defibrillators are safe in MRI scanners, while others are not. Please consult with your physician to see if you have an MRI-compatible device.   Surgery: Should you require surgery in the future, some electrosurgical devices can interfere with your device function. You should discuss this with your surgeon before any operation.   Radiation Therapy: If you ever require radiation therapy in the future, care must be taken  to avoid irradiating the device.      Long-Term Follow-Up   Your device has the ability to transmit device information from home to the doctors office using a home monitoring system.   This remote system takes the place of a doctors visit. Your device will be checked from home every 3-6 months. Every 6-12 months, you will be asked to come into the office for an in-office check.   Your device should last in the range of 6-12 years. This depends on many factors including how often it paces the heart.   When the battery is low, a generator change will be performed. This is a same-day procedure with no post-op activity restrictions, unless one of the pacemaker or defibrillator leads needs to be replaced at that time, or a new lead is added to your existing system.

## 2018-06-04 RX ORDER — FLUTICASONE PROPIONATE 50 MCG
SPRAY, SUSPENSION (ML) NASAL
Qty: 16 G | Refills: 6 | Status: SHIPPED | OUTPATIENT
Start: 2018-06-04 | End: 2018-09-25

## 2018-06-07 ENCOUNTER — LAB VISIT (OUTPATIENT)
Dept: LAB | Facility: HOSPITAL | Age: 63
End: 2018-06-07
Attending: INTERNAL MEDICINE
Payer: COMMERCIAL

## 2018-06-07 DIAGNOSIS — I44.7 LEFT BUNDLE-BRANCH BLOCK: ICD-10-CM

## 2018-06-07 LAB
ANION GAP SERPL CALC-SCNC: 7 MMOL/L
APTT BLDCRRT: 24.5 SEC
BASOPHILS # BLD AUTO: 0.04 K/UL
BASOPHILS NFR BLD: 0.9 %
BUN SERPL-MCNC: 19 MG/DL
CALCIUM SERPL-MCNC: 10.2 MG/DL
CHLORIDE SERPL-SCNC: 104 MMOL/L
CO2 SERPL-SCNC: 32 MMOL/L
CREAT SERPL-MCNC: 1 MG/DL
DIFFERENTIAL METHOD: ABNORMAL
EOSINOPHIL # BLD AUTO: 0.1 K/UL
EOSINOPHIL NFR BLD: 2.6 %
ERYTHROCYTE [DISTWIDTH] IN BLOOD BY AUTOMATED COUNT: 15 %
EST. GFR  (AFRICAN AMERICAN): >60 ML/MIN/1.73 M^2
EST. GFR  (NON AFRICAN AMERICAN): >60 ML/MIN/1.73 M^2
GLUCOSE SERPL-MCNC: 125 MG/DL
HCT VFR BLD AUTO: 40.5 %
HGB BLD-MCNC: 13.7 G/DL
INR PPP: 0.9
LYMPHOCYTES # BLD AUTO: 1.6 K/UL
LYMPHOCYTES NFR BLD: 33.6 %
MCH RBC QN AUTO: 29.3 PG
MCHC RBC AUTO-ENTMCNC: 33.8 G/DL
MCV RBC AUTO: 87 FL
MONOCYTES # BLD AUTO: 0.5 K/UL
MONOCYTES NFR BLD: 10.4 %
NEUTROPHILS # BLD AUTO: 2.4 K/UL
NEUTROPHILS NFR BLD: 51 %
PLATELET # BLD AUTO: 164 K/UL
PMV BLD AUTO: 11.7 FL
POTASSIUM SERPL-SCNC: 5.2 MMOL/L
PROTHROMBIN TIME: 9.8 SEC
RBC # BLD AUTO: 4.68 M/UL
SODIUM SERPL-SCNC: 143 MMOL/L
WBC # BLD AUTO: 4.7 K/UL

## 2018-06-07 PROCEDURE — 80048 BASIC METABOLIC PNL TOTAL CA: CPT

## 2018-06-07 PROCEDURE — 85730 THROMBOPLASTIN TIME PARTIAL: CPT

## 2018-06-07 PROCEDURE — 36415 COLL VENOUS BLD VENIPUNCTURE: CPT

## 2018-06-07 PROCEDURE — 85610 PROTHROMBIN TIME: CPT

## 2018-06-07 PROCEDURE — 85025 COMPLETE CBC W/AUTO DIFF WBC: CPT

## 2018-06-14 ENCOUNTER — ANESTHESIA EVENT (OUTPATIENT)
Dept: MEDSURG UNIT | Facility: HOSPITAL | Age: 63
End: 2018-06-14
Payer: COMMERCIAL

## 2018-06-14 ENCOUNTER — TELEPHONE (OUTPATIENT)
Dept: ELECTROPHYSIOLOGY | Facility: CLINIC | Age: 63
End: 2018-06-14

## 2018-06-14 NOTE — TELEPHONE ENCOUNTER
Called patient to confirm procedure tomorrow 6/15. Reviewed arrival time 5:30AM, use of hibiclens soap tonight and in AM, and medication restrictions. Pt denied any questions or concerns, verbalized understanding of all instructions.

## 2018-06-14 NOTE — ANESTHESIA PREPROCEDURE EVALUATION
06/14/2018  Hannah Montoya is a 62 y.o., female with non-ischemic cardiomyopathy here for AICD lead revision.    Pre-operative evaluation for Procedure(s) (LRB):  REVISION-LEAD-ICD (N/A)        Patient Active Problem List   Diagnosis    Essential hypertension    COPD (chronic obstructive pulmonary disease)    Tobacco abuse    Malignant neoplasm of overlapping sites of female breast    Breast CA    Acute respiratory failure with hypoxia and hypercapnia    Mild protein malnutrition    Elevated troponin    Type 2 diabetes mellitus without complication    Seasonal allergies    Dehydration    Acute systolic congestive heart failure    Left bundle-branch block    Dilated cardiomyopathy    NICM (nonischemic cardiomyopathy)       Review of patient's allergies indicates:   Allergen Reactions    Adhesive Rash       No current facility-administered medications on file prior to encounter.      Current Outpatient Prescriptions on File Prior to Encounter   Medication Sig Dispense Refill    albuterol (PROAIR HFA) 90 mcg/actuation inhaler Inhale 2 puffs into the lungs every 6 (six) hours as needed. Rescue 17 g 5    albuterol-ipratropium 2.5mg-0.5mg/3mL (DUO-NEB) 0.5 mg-3 mg(2.5 mg base)/3 mL nebulizer solution Take 3 mLs by nebulization every 6 (six) hours as needed for Wheezing. Rescue 1 Box 3    azelastine (ASTELIN) 137 mcg (0.1 %) nasal spray 1 spray (137 mcg total) by Nasal route 2 (two) times daily. 30 mL 0    carvedilol (COREG) 25 MG tablet TAKE 1 TABLET(25 MG) BY MOUTH TWICE DAILY 60 tablet 6    cetirizine (ZYRTEC) 10 MG tablet Take 10 mg by mouth once daily.      furosemide (LASIX) 40 MG tablet TAKE 1 TABLET(40 MG) BY MOUTH TWICE DAILY 60 tablet 6    metFORMIN (GLUCOPHAGE) 500 MG tablet TAKE ONE Tablet BY MOUTH TWICE DAILY 180 tablet 3    PULMICORT FLEXHALER 180 mcg/actuation AePB  INHALE 2 PUFFS BY MOUTH TWICE DAILY 1 each 6    sertraline (ZOLOFT) 100 MG tablet TAKE ONE TABLET BY MOUTH EVERY MORNING 90 tablet 1       Past Surgical History:   Procedure Laterality Date    BREAST BIOPSY Right 2016    [facility]    BREAST LUMPECTOMY Right      SECTION      x2    CHOLECYSTECTOMY      TUBAL LIGATION         Social History     Social History    Marital status:      Spouse name: N/A    Number of children: N/A    Years of education: N/A     Occupational History     [employer]     Social History Main Topics    Smoking status: Former Smoker     Packs/day: 0.50     Years: 40.00     Quit date: 2016    Smokeless tobacco: Never Used    Alcohol use 0.0 oz/week      Comment: rare- holiday    Drug use: No    Sexual activity: Not on file     Other Topics Concern    Not on file     Social History Narrative    No narrative on file         CBC: No results for input(s): WBC, RBC, HGB, HCT, PLT, MCV, MCH, MCHC in the last 72 hours.    CMP: No results for input(s): NA, K, CL, CO2, BUN, CREATININE, GLU, MG, PHOS, CALCIUM, ALBUMIN, PROT, ALKPHOS, ALT, AST, BILITOT in the last 72 hours.    INR  No results for input(s): PT, INR, PROTIME, APTT in the last 72 hours.        EKD Echo:  Results for orders placed or performed during the hospital encounter of 17   2D echo with color flow doppler   Result Value Ref Range    EF 15 (A) 55 - 65    Mitral Valve Regurgitation MILD     Diastolic Dysfunction Yes (A)     Est. PA Systolic Pressure 44.99 (A)     Pericardial Effusion NONE     Tricuspid Valve Regurgitation MILD              Anesthesia Evaluation    I have reviewed the Patient Summary Reports.    I have reviewed the Nursing Notes.   I have reviewed the Medications.     Review of Systems  Anesthesia Hx:  No problems with previous Anesthesia    Hematology/Oncology:  Hematology Normal   Oncology Normal     EENT/Dental:EENT/Dental Normal   Cardiovascular:   Hypertension  Dysrhythmias CHF Orthopnea NYHA Classification III    Pulmonary:   COPD Denies Asthma.    Renal/:  Renal/ Normal     Hepatic/GI:  Hepatic/GI Normal    Musculoskeletal:  Musculoskeletal Normal    Neurological:  Neurology Normal    Endocrine:   Diabetes    Dermatological:  Skin Normal    Psych:  Psychiatric Normal           Physical Exam  General:  Well nourished    Airway/Jaw/Neck:  Airway Findings: Mouth Opening: Normal Tongue: Normal  General Airway Assessment: Adult  Mallampati: II  Jaw/Neck Findings:  Neck ROM: Normal ROM     Eyes/Ears/Nose:  Eyes/Ears/Nose Findings:    Dental:  Dental Findings: In tact   Chest/Lungs:  Chest/Lungs Findings: Clear to auscultation, Normal Respiratory Rate  Subcutaneous device left chest     Heart/Vascular:  Heart Findings: Rate: Normal  Rhythm: Regular Rhythm  Sounds: Normal  Heart Murmur  Vascular Findings:        Mental Status:  Mental Status Findings:  Cooperative, Alert and Oriented         Anesthesia Plan  Type of Anesthesia, risks & benefits discussed:  Anesthesia Type:  general, MAC  Patient's Preference: General/MAC  Intra-op Monitoring Plan: standard ASA monitors  Intra-op Monitoring Plan Comments:   Post Op Pain Control Plan:   Post Op Pain Control Plan Comments: IV meds as needed  Induction:   IV  Beta Blocker:         Informed Consent: Patient understands risks and agrees with Anesthesia plan.  Questions answered. Anesthesia consent signed with patient.  ASA Score: 4     Day of Surgery Review of History & Physical:    H&P update referred to the provider.     Anesthesia Plan Notes: Discussed anesthetic options, pt understands and agrees with plan        Ready For Surgery From Anesthesia Perspective.

## 2018-06-15 ENCOUNTER — HOSPITAL ENCOUNTER (OUTPATIENT)
Facility: HOSPITAL | Age: 63
Discharge: HOME | End: 2018-06-16
Attending: INTERNAL MEDICINE | Admitting: INTERNAL MEDICINE
Payer: COMMERCIAL

## 2018-06-15 ENCOUNTER — SURGERY (OUTPATIENT)
Age: 63
End: 2018-06-15

## 2018-06-15 ENCOUNTER — ANESTHESIA (OUTPATIENT)
Dept: MEDSURG UNIT | Facility: HOSPITAL | Age: 63
End: 2018-06-15
Payer: COMMERCIAL

## 2018-06-15 DIAGNOSIS — I10 ESSENTIAL (PRIMARY) HYPERTENSION: ICD-10-CM

## 2018-06-15 DIAGNOSIS — T82.198A MALFUNCTION OF IMPLANTABLE DEFIBRILLATOR VENTRICULAR (ICD) LEAD: Primary | ICD-10-CM

## 2018-06-15 DIAGNOSIS — Z95.810 ICD (IMPLANTABLE CARDIOVERTER-DEFIBRILLATOR) IN PLACE: ICD-10-CM

## 2018-06-15 DIAGNOSIS — I49.9 ARRHYTHMIA: ICD-10-CM

## 2018-06-15 DIAGNOSIS — I44.7 LEFT BUNDLE-BRANCH BLOCK: ICD-10-CM

## 2018-06-15 LAB
POCT GLUCOSE: 103 MG/DL (ref 70–110)
POCT GLUCOSE: 107 MG/DL (ref 70–110)
POCT GLUCOSE: 127 MG/DL (ref 70–110)
POCT GLUCOSE: 145 MG/DL (ref 70–110)

## 2018-06-15 PROCEDURE — 63600175 PHARM REV CODE 636 W HCPCS

## 2018-06-15 PROCEDURE — 37000009 HC ANESTHESIA EA ADD 15 MINS: Performed by: INTERNAL MEDICINE

## 2018-06-15 PROCEDURE — 25000003 PHARM REV CODE 250: Performed by: INTERNAL MEDICINE

## 2018-06-15 PROCEDURE — 25500020 PHARM REV CODE 255: Performed by: NURSE ANESTHETIST, CERTIFIED REGISTERED

## 2018-06-15 PROCEDURE — 37000008 HC ANESTHESIA 1ST 15 MINUTES: Performed by: INTERNAL MEDICINE

## 2018-06-15 PROCEDURE — 94761 N-INVAS EAR/PLS OXIMETRY MLT: CPT

## 2018-06-15 PROCEDURE — 33215 REPOSITION PACING-DEFIB LEAD: CPT | Mod: 79,,, | Performed by: INTERNAL MEDICINE

## 2018-06-15 PROCEDURE — 63600175 PHARM REV CODE 636 W HCPCS: Performed by: NURSE ANESTHETIST, CERTIFIED REGISTERED

## 2018-06-15 PROCEDURE — 93010 ELECTROCARDIOGRAM REPORT: CPT | Mod: ,,, | Performed by: INTERNAL MEDICINE

## 2018-06-15 PROCEDURE — 94640 AIRWAY INHALATION TREATMENT: CPT

## 2018-06-15 PROCEDURE — A4216 STERILE WATER/SALINE, 10 ML: HCPCS | Performed by: NURSE ANESTHETIST, CERTIFIED REGISTERED

## 2018-06-15 PROCEDURE — 63600175 PHARM REV CODE 636 W HCPCS: Performed by: INTERNAL MEDICINE

## 2018-06-15 PROCEDURE — 63600175 PHARM REV CODE 636 W HCPCS: Performed by: NURSE PRACTITIONER

## 2018-06-15 PROCEDURE — 82962 GLUCOSE BLOOD TEST: CPT | Performed by: INTERNAL MEDICINE

## 2018-06-15 PROCEDURE — 33215 REPOSITION PACING-DEFIB LEAD: CPT

## 2018-06-15 PROCEDURE — 25000003 PHARM REV CODE 250: Performed by: NURSE PRACTITIONER

## 2018-06-15 PROCEDURE — 82962 GLUCOSE BLOOD TEST: CPT

## 2018-06-15 PROCEDURE — D9220A PRA ANESTHESIA: Mod: CRNA,,, | Performed by: NURSE ANESTHETIST, CERTIFIED REGISTERED

## 2018-06-15 PROCEDURE — 25000003 PHARM REV CODE 250

## 2018-06-15 PROCEDURE — 25500020 PHARM REV CODE 255

## 2018-06-15 PROCEDURE — 25000242 PHARM REV CODE 250 ALT 637 W/ HCPCS: Performed by: INTERNAL MEDICINE

## 2018-06-15 PROCEDURE — 93005 ELECTROCARDIOGRAM TRACING: CPT

## 2018-06-15 PROCEDURE — D9220A PRA ANESTHESIA: Mod: ANES,,, | Performed by: ANESTHESIOLOGY

## 2018-06-15 PROCEDURE — 93010 ELECTROCARDIOGRAM REPORT: CPT | Mod: 76,,, | Performed by: INTERNAL MEDICINE

## 2018-06-15 PROCEDURE — 25000003 PHARM REV CODE 250: Performed by: NURSE ANESTHETIST, CERTIFIED REGISTERED

## 2018-06-15 RX ORDER — INSULIN ASPART 100 [IU]/ML
0-5 INJECTION, SOLUTION INTRAVENOUS; SUBCUTANEOUS
Status: DISCONTINUED | OUTPATIENT
Start: 2018-06-15 | End: 2018-06-16 | Stop reason: HOSPADM

## 2018-06-15 RX ORDER — GLYCOPYRROLATE 0.2 MG/ML
INJECTION INTRAMUSCULAR; INTRAVENOUS
Status: DISCONTINUED | OUTPATIENT
Start: 2018-06-15 | End: 2018-06-15

## 2018-06-15 RX ORDER — SODIUM CHLORIDE 9 MG/ML
INJECTION, SOLUTION INTRAVENOUS CONTINUOUS
Status: DISCONTINUED | OUTPATIENT
Start: 2018-06-15 | End: 2018-06-15

## 2018-06-15 RX ORDER — CEFAZOLIN SODIUM 1 G/3ML
2 INJECTION, POWDER, FOR SOLUTION INTRAMUSCULAR; INTRAVENOUS
Status: COMPLETED | OUTPATIENT
Start: 2018-06-15 | End: 2018-06-15

## 2018-06-15 RX ORDER — IBUPROFEN 200 MG
16 TABLET ORAL
Status: DISCONTINUED | OUTPATIENT
Start: 2018-06-15 | End: 2018-06-16 | Stop reason: HOSPADM

## 2018-06-15 RX ORDER — CETIRIZINE HYDROCHLORIDE 10 MG/1
10 TABLET ORAL NIGHTLY
Status: DISCONTINUED | OUTPATIENT
Start: 2018-06-15 | End: 2018-06-16 | Stop reason: HOSPADM

## 2018-06-15 RX ORDER — GLUCAGON 1 MG
1 KIT INJECTION
Status: DISCONTINUED | OUTPATIENT
Start: 2018-06-15 | End: 2018-06-16 | Stop reason: HOSPADM

## 2018-06-15 RX ORDER — KETAMINE HYDROCHLORIDE 100 MG/ML
INJECTION, SOLUTION INTRAMUSCULAR; INTRAVENOUS
Status: DISCONTINUED | OUTPATIENT
Start: 2018-06-15 | End: 2018-06-15

## 2018-06-15 RX ORDER — SODIUM CHLORIDE 0.9 % (FLUSH) 0.9 %
3 SYRINGE (ML) INJECTION
Status: DISCONTINUED | OUTPATIENT
Start: 2018-06-15 | End: 2018-06-16 | Stop reason: HOSPADM

## 2018-06-15 RX ORDER — FLUTICASONE PROPIONATE 50 MCG
1 SPRAY, SUSPENSION (ML) NASAL 2 TIMES DAILY
Status: DISCONTINUED | OUTPATIENT
Start: 2018-06-15 | End: 2018-06-16 | Stop reason: HOSPADM

## 2018-06-15 RX ORDER — IBUPROFEN 200 MG
24 TABLET ORAL
Status: DISCONTINUED | OUTPATIENT
Start: 2018-06-15 | End: 2018-06-16 | Stop reason: HOSPADM

## 2018-06-15 RX ORDER — IODIXANOL 320 MG/ML
INJECTION, SOLUTION INTRAVASCULAR
Status: DISCONTINUED | OUTPATIENT
Start: 2018-06-15 | End: 2018-06-15

## 2018-06-15 RX ORDER — CEPHALEXIN 250 MG/1
500 CAPSULE ORAL EVERY 8 HOURS
Status: DISCONTINUED | OUTPATIENT
Start: 2018-06-16 | End: 2018-06-16 | Stop reason: HOSPADM

## 2018-06-15 RX ORDER — SODIUM CHLORIDE 9 MG/ML
INJECTION, SOLUTION INTRAVENOUS CONTINUOUS
Status: DISCONTINUED | OUTPATIENT
Start: 2018-06-15 | End: 2018-06-16 | Stop reason: HOSPADM

## 2018-06-15 RX ORDER — KETAMINE HCL IN 0.9 % NACL 50 MG/5 ML
SYRINGE (ML) INTRAVENOUS
Status: DISCONTINUED | OUTPATIENT
Start: 2018-06-15 | End: 2018-06-15

## 2018-06-15 RX ORDER — FENTANYL CITRATE 50 UG/ML
25 INJECTION, SOLUTION INTRAMUSCULAR; INTRAVENOUS EVERY 5 MIN PRN
Status: DISCONTINUED | OUTPATIENT
Start: 2018-06-15 | End: 2018-06-15 | Stop reason: HOSPADM

## 2018-06-15 RX ORDER — ALBUTEROL SULFATE 90 UG/1
2 AEROSOL, METERED RESPIRATORY (INHALATION) EVERY 6 HOURS PRN
Status: DISCONTINUED | OUTPATIENT
Start: 2018-06-15 | End: 2018-06-16 | Stop reason: HOSPADM

## 2018-06-15 RX ORDER — FUROSEMIDE 40 MG/1
40 TABLET ORAL DAILY
Status: DISCONTINUED | OUTPATIENT
Start: 2018-06-15 | End: 2018-06-16 | Stop reason: HOSPADM

## 2018-06-15 RX ORDER — SERTRALINE HYDROCHLORIDE 100 MG/1
100 TABLET, FILM COATED ORAL NIGHTLY
Status: DISCONTINUED | OUTPATIENT
Start: 2018-06-15 | End: 2018-06-16 | Stop reason: HOSPADM

## 2018-06-15 RX ORDER — AZELASTINE 1 MG/ML
1 SPRAY, METERED NASAL 2 TIMES DAILY
Status: DISCONTINUED | OUTPATIENT
Start: 2018-06-15 | End: 2018-06-16 | Stop reason: HOSPADM

## 2018-06-15 RX ORDER — DEXMEDETOMIDINE HYDROCHLORIDE 100 UG/ML
INJECTION, SOLUTION INTRAVENOUS
Status: DISCONTINUED | OUTPATIENT
Start: 2018-06-15 | End: 2018-06-15

## 2018-06-15 RX ORDER — MIDAZOLAM HYDROCHLORIDE 1 MG/ML
INJECTION, SOLUTION INTRAMUSCULAR; INTRAVENOUS
Status: DISCONTINUED | OUTPATIENT
Start: 2018-06-15 | End: 2018-06-15

## 2018-06-15 RX ORDER — IPRATROPIUM BROMIDE AND ALBUTEROL SULFATE 2.5; .5 MG/3ML; MG/3ML
3 SOLUTION RESPIRATORY (INHALATION) EVERY 6 HOURS PRN
Status: DISCONTINUED | OUTPATIENT
Start: 2018-06-15 | End: 2018-06-16 | Stop reason: HOSPADM

## 2018-06-15 RX ORDER — ACETAMINOPHEN 325 MG/1
650 TABLET ORAL EVERY 6 HOURS PRN
Status: DISCONTINUED | OUTPATIENT
Start: 2018-06-15 | End: 2018-06-16 | Stop reason: HOSPADM

## 2018-06-15 RX ORDER — CARVEDILOL 25 MG/1
25 TABLET ORAL 2 TIMES DAILY
Status: DISCONTINUED | OUTPATIENT
Start: 2018-06-15 | End: 2018-06-16 | Stop reason: HOSPADM

## 2018-06-15 RX ADMIN — GLYCOPYRROLATE 0.1 MG: 0.2 INJECTION, SOLUTION INTRAMUSCULAR; INTRAVENOUS at 07:06

## 2018-06-15 RX ADMIN — IPRATROPIUM BROMIDE AND ALBUTEROL SULFATE 3 ML: .5; 3 SOLUTION RESPIRATORY (INHALATION) at 01:06

## 2018-06-15 RX ADMIN — DEXMEDETOMIDINE HYDROCHLORIDE 0.8 MCG/KG/HR: 100 INJECTION, SOLUTION, CONCENTRATE INTRAVENOUS at 07:06

## 2018-06-15 RX ADMIN — SODIUM CHLORIDE: 0.9 INJECTION, SOLUTION INTRAVENOUS at 07:06

## 2018-06-15 RX ADMIN — CEFAZOLIN 2 G: 330 INJECTION, POWDER, FOR SOLUTION INTRAMUSCULAR; INTRAVENOUS at 07:06

## 2018-06-15 RX ADMIN — IODIXANOL 10 ML: 320 INJECTION, SOLUTION INTRAVASCULAR at 08:06

## 2018-06-15 RX ADMIN — ACETAMINOPHEN 650 MG: 325 TABLET, FILM COATED ORAL at 03:06

## 2018-06-15 RX ADMIN — Medication 20 MG: at 07:06

## 2018-06-15 RX ADMIN — CEFAZOLIN SODIUM 2 G: 1 INJECTION, POWDER, FOR SOLUTION INTRAMUSCULAR; INTRAVENOUS at 03:06

## 2018-06-15 RX ADMIN — CARVEDILOL 25 MG: 25 TABLET, FILM COATED ORAL at 10:06

## 2018-06-15 RX ADMIN — CETIRIZINE HYDROCHLORIDE 10 MG: 10 TABLET, FILM COATED ORAL at 10:06

## 2018-06-15 RX ADMIN — CEFAZOLIN SODIUM 2 G: 1 INJECTION, POWDER, FOR SOLUTION INTRAMUSCULAR; INTRAVENOUS at 11:06

## 2018-06-15 RX ADMIN — SERTRALINE HYDROCHLORIDE 100 MG: 100 TABLET ORAL at 10:06

## 2018-06-15 RX ADMIN — FUROSEMIDE 40 MG: 40 TABLET ORAL at 05:06

## 2018-06-15 RX ADMIN — DEXMEDETOMIDINE HYDROCHLORIDE 78 MCG: 100 INJECTION, SOLUTION, CONCENTRATE INTRAVENOUS at 07:06

## 2018-06-15 RX ADMIN — MIDAZOLAM 2 MG: 1 INJECTION INTRAMUSCULAR; INTRAVENOUS at 07:06

## 2018-06-15 RX ADMIN — Medication 10 MG: at 08:06

## 2018-06-15 NOTE — ASSESSMENT & PLAN NOTE
- Proceed with HBP lead revision today  - Ancef prior to procedure    We discussed the alternatives, benefits and risks of the procedure including pain, infection, bleeding, injury to lung causing pneumothorax requiring tube placement, injury to heart valves, puncture of the heart leading to pericardial effusion or tamponade requiring tube drainage, heart attack, stroke and death.

## 2018-06-15 NOTE — HPI
62 yoF NICM, LBBB s/p CRT-D here for second opinion regarding transvenous LV lead placement. She has an attempt at an LV lead 8/10/17 by Dr Lopez. The attempt was complicated by tortuous venous anatomy and inability to pass an LV lead. Venograms show mid and anterolateral branches with tortuous courses. The mid lateral branch was accessed with a 014 wire but the lead could not be advanced. She had an epicardial lead implanted however she has elevated thresholds, 5.0 V @ 1.2 ms. She has expected 1-1.5 y on her battery life giving her overall ~2 years with her current device. She has mild improvement in symptoms. QRS went from 168 to 150 ms with CRT pacing. Underwent attempted LV lead placement on 5/2 which was successful, then underwent successful placement of His bundle lead with eventual loss of HB lead capture. Patient presents today for HBP lead revision.

## 2018-06-15 NOTE — SUBJECTIVE & OBJECTIVE
Past Medical History:   Diagnosis Date    Asthma     bronchitis in past    Breast cancer 2016    right    Cardiomyopathy     COPD (chronic obstructive pulmonary disease)     Diabetes mellitus, type 2     Hyperglycemia     Hyperlipidemia     Hypertension        Past Surgical History:   Procedure Laterality Date    BREAST BIOPSY Right 2016    [facility]    BREAST LUMPECTOMY Right      SECTION      x2    CHOLECYSTECTOMY      TUBAL LIGATION         Review of patient's allergies indicates:   Allergen Reactions    Adhesive Rash       No current facility-administered medications on file prior to encounter.      Current Outpatient Prescriptions on File Prior to Encounter   Medication Sig    albuterol (PROAIR HFA) 90 mcg/actuation inhaler Inhale 2 puffs into the lungs every 6 (six) hours as needed. Rescue    albuterol-ipratropium 2.5mg-0.5mg/3mL (DUO-NEB) 0.5 mg-3 mg(2.5 mg base)/3 mL nebulizer solution Take 3 mLs by nebulization every 6 (six) hours as needed for Wheezing. Rescue    azelastine (ASTELIN) 137 mcg (0.1 %) nasal spray 1 spray (137 mcg total) by Nasal route 2 (two) times daily.    carvedilol (COREG) 25 MG tablet TAKE 1 TABLET(25 MG) BY MOUTH TWICE DAILY    cetirizine (ZYRTEC) 10 MG tablet Take 10 mg by mouth every evening.     furosemide (LASIX) 40 MG tablet TAKE 1 TABLET(40 MG) BY MOUTH TWICE DAILY    metFORMIN (GLUCOPHAGE) 500 MG tablet TAKE ONE Tablet BY MOUTH TWICE DAILY    PULMICORT FLEXHALER 180 mcg/actuation AePB INHALE 2 PUFFS BY MOUTH TWICE DAILY    sertraline (ZOLOFT) 100 MG tablet TAKE ONE TABLET BY MOUTH EVERY MORNING (Patient taking differently: TAKE ONE TABLET BY MOUTH EVERY EVENING)     Family History     Problem Relation (Age of Onset)    Clotting disorder Brother    Kidney disease Mother, Father        Social History Main Topics    Smoking status: Former Smoker     Packs/day: 0.50     Years: 40.00     Quit date: 2016    Smokeless tobacco: Never Used    Alcohol use  0.0 oz/week      Comment: rare- holiday    Drug use: No    Sexual activity: Not on file     Review of Systems   Constitution: Negative for chills and fever.   HENT: Negative for hoarse voice and sore throat.    Cardiovascular: Negative for chest pain, claudication, cyanosis, dyspnea on exertion, irregular heartbeat, leg swelling, near-syncope, orthopnea, palpitations, paroxysmal nocturnal dyspnea and syncope.   Respiratory: Negative for cough, hemoptysis and shortness of breath.    Musculoskeletal: Negative for back pain, joint pain and joint swelling.   Gastrointestinal: Negative for abdominal pain, constipation, diarrhea, hematochezia, melena, nausea and vomiting.   Genitourinary: Negative for dysuria, hematuria and incomplete emptying.   Neurological: Negative for dizziness, headaches and light-headedness.   Psychiatric/Behavioral: Negative for altered mental status, depression and suicidal ideas. The patient is not nervous/anxious.      Objective:     Vital Signs (Most Recent):  Temp: 98.1 °F (36.7 °C) (06/15/18 0623)  Pulse: 83 (06/15/18 0623)  Resp: 16 (06/15/18 0623)  BP: (!) 100/54 (06/15/18 0628)  SpO2: 95 % (06/15/18 0623) Vital Signs (24h Range):  Temp:  [98.1 °F (36.7 °C)] 98.1 °F (36.7 °C)  Pulse:  [83] 83  Resp:  [16] 16  SpO2:  [95 %] 95 %  BP: (100-107)/(54-56) 100/54     Weight: 78 kg (172 lb)  Body mass index is 31.46 kg/m².    SpO2: 95 %  O2 Device (Oxygen Therapy): room air    Physical Exam   Constitutional: She is oriented to person, place, and time. She appears well-developed and well-nourished. No distress.   HENT:   Head: Normocephalic and atraumatic.   Right Ear: External ear normal.   Left Ear: External ear normal.   Nose: Nose normal.   Mouth/Throat: Oropharynx is clear and moist. No oropharyngeal exudate.   Eyes: Conjunctivae and EOM are normal. Pupils are equal, round, and reactive to light. Right eye exhibits no discharge. Left eye exhibits no discharge. No scleral icterus.   Neck:  Normal range of motion. Neck supple. No JVD present. No tracheal deviation present. No thyromegaly present.   Cardiovascular: Normal rate, regular rhythm, normal heart sounds and intact distal pulses.  Exam reveals no gallop and no friction rub.    No murmur heard.  Pulmonary/Chest: Effort normal and breath sounds normal. No stridor. No respiratory distress. She has no wheezes. She has no rales. She exhibits no tenderness.   Abdominal: Soft. Bowel sounds are normal. She exhibits no distension and no mass. There is no tenderness. There is no rebound and no guarding.   Musculoskeletal: Normal range of motion. She exhibits no edema, tenderness or deformity.   Lymphadenopathy:     She has no cervical adenopathy.   Neurological: She is alert and oriented to person, place, and time. No cranial nerve deficit.   Skin: Skin is warm and dry. No rash noted. She is not diaphoretic. No erythema. No pallor.   Psychiatric: She has a normal mood and affect. Her behavior is normal. Thought content normal.   Nursing note and vitals reviewed.

## 2018-06-15 NOTE — PLAN OF CARE
Problem: Patient Care Overview  Goal: Plan of Care Review  Outcome: Ongoing (interventions implemented as appropriate)  Pt aaox3, vss, no s/s of distress noted.  Accuchecks ac/hs.  Safety precautions maintained, non skid socks and fall risk band on.  Pt monitored on telemetry.  Pt denies pain at this time.  Left chest wall inc w/ dermabond cdi, no bleeding noted.  Ice pack in place.  Call light within reach.

## 2018-06-15 NOTE — ANESTHESIA POSTPROCEDURE EVALUATION
"Anesthesia Post Evaluation    Patient: Hannah Montoya    Procedure(s) Performed: Procedure(s) (LRB):  REVISION-LEAD-ICD (N/A)    Final Anesthesia Type: general  Patient location during evaluation: PACU  Patient participation: Yes- Able to Participate  Level of consciousness: awake and alert  Post-procedure vital signs: reviewed and stable  Pain management: adequate  Airway patency: patent  PONV status at discharge: No PONV  Anesthetic complications: no      Cardiovascular status: blood pressure returned to baseline  Respiratory status: unassisted  Hydration status: euvolemic  Follow-up not needed.        Visit Vitals  /60 (BP Location: Right arm, Patient Position: Lying)   Pulse 75   Temp 35.9 °C (96.7 °F) (Oral)   Resp 15   Ht 5' 2" (1.575 m)   Wt 78 kg (172 lb)   LMP  (LMP Unknown)   SpO2 (!) 92%   Breastfeeding? No   BMI 31.46 kg/m²       Pain/James Score: Pain Assessment Performed: Yes (6/15/2018 12:50 PM)  Presence of Pain: denies (6/15/2018 12:50 PM)  James Score: 9 (6/15/2018 12:00 PM)      "

## 2018-06-15 NOTE — PROGRESS NOTES
EP on call paged again in reference to pt's throat pain and request for PRN acetaminophen. Awaiting return call.

## 2018-06-15 NOTE — TRANSFER OF CARE
"Anesthesia Transfer of Care Note    Patient: Hannah Montoya    Procedure(s) Performed: Procedure(s) (LRB):  REVISION-LEAD-ICD (N/A)    Patient location: [facility]    Anesthesia Type: general    Transport from OR: Transported from OR on 2-3 L/min O2 by NC with adequate spontaneous ventilation    Post pain: adequate analgesia    Post assessment: no apparent anesthetic complications and tolerated procedure well    Post vital signs: stable    Level of consciousness: sedated and responds to stimulation    Nausea/Vomiting: no nausea/vomiting    Complications: none    Transfer of care protocol was followed      Last vitals:   Visit Vitals  BP (!) 100/54 (BP Location: Left arm, Patient Position: Lying)   Pulse 83   Temp 36.7 °C (98.1 °F) (Oral)   Resp 16   Ht 5' 2" (1.575 m)   Wt 78 kg (172 lb)   LMP  (LMP Unknown)   SpO2 95%   Breastfeeding? No   BMI 31.46 kg/m²     "

## 2018-06-15 NOTE — NURSING TRANSFER
Nursing Transfer Note      6/15/2018     Transfer To: obs 10    Transfer via stretcher    Transfer with cardiac monitoring    Transported by escort    Medicines sent: no    Chart send with patient: Yes    Notified: daughter in room    Patient reassessed at: 6/15/18@1200hrs

## 2018-06-15 NOTE — H&P
[facility]  Cardiac Electrophysiology  History and Physical     Admission Date: 6/15/2018  Code Status: Prior   Attending Provider: Javy Gates MD   Principal Problem:<principal problem not specified>    Subjective:     Chief Complaint:  NICM     HPI:  62 yoF NICM, LBBB s/p CRT-D here for second opinion regarding transvenous LV lead placement. She has an attempt at an LV lead 8/10/17 by Dr Lopez. The attempt was complicated by tortuous venous anatomy and inability to pass an LV lead. Venograms show mid and anterolateral branches with tortuous courses. The mid lateral branch was accessed with a 014 wire but the lead could not be advanced. She had an epicardial lead implanted however she has elevated thresholds, 5.0 V @ 1.2 ms. She has expected 1-1.5 y on her battery life giving her overall ~2 years with her current device. She has mild improvement in symptoms. QRS went from 168 to 150 ms with CRT pacing. Underwent attempted LV lead placement on  which was successful, then underwent successful placement of His bundle lead with eventual loss of HB lead capture. Patient presents today for HBP lead revision.     Past Medical History:   Diagnosis Date    Asthma     bronchitis in past    Breast cancer 2016    right    Cardiomyopathy     COPD (chronic obstructive pulmonary disease)     Diabetes mellitus, type 2     Hyperglycemia     Hyperlipidemia     Hypertension        Past Surgical History:   Procedure Laterality Date    [facility]OPSY Right 2016    [facility]    BREAST LUMPECTOMY Right      SECTION      x2    CHOLECYSTECTOMY      TUBAL LIGATION         Review of patient's allergies indicates:   Allergen Reactions    Adhesive Rash       No current facility-administered medications on file prior to encounter.      Current Outpatient Prescriptions on File Prior to Encounter   Medication Sig    albuterol (PROAIR HFA) 90 mcg/actuation inhaler Inhale 2 puffs into the lungs  every 6 (six) hours as needed. Rescue    albuterol-ipratropium 2.5mg-0.5mg/3mL (DUO-NEB) 0.5 mg-3 mg(2.5 mg base)/3 mL nebulizer solution Take 3 mLs by nebulization every 6 (six) hours as needed for Wheezing. Rescue    azelastine (ASTELIN) 137 mcg (0.1 %) nasal spray 1 spray (137 mcg total) by Nasal route 2 (two) times daily.    carvedilol (COREG) 25 MG tablet TAKE 1 TABLET(25 MG) BY MOUTH TWICE DAILY    cetirizine (ZYRTEC) 10 MG tablet Take 10 mg by mouth every evening.     furosemide (LASIX) 40 MG tablet TAKE 1 TABLET(40 MG) BY MOUTH TWICE DAILY    metFORMIN (GLUCOPHAGE) 500 MG tablet TAKE ONE Tablet BY MOUTH TWICE DAILY    PULMICORT FLEXHALER 180 mcg/actuation AePB INHALE 2 PUFFS BY MOUTH TWICE DAILY    sertraline (ZOLOFT) 100 MG tablet TAKE ONE TABLET BY MOUTH EVERY MORNING (Patient taking differently: TAKE ONE TABLET BY MOUTH EVERY EVENING)     Family History     Problem Relation (Age of Onset)    Clotting disorder Brother    Kidney disease Mother, Father        Social History Main Topics    Smoking status: Former Smoker     Packs/day: 0.50     Years: 40.00     Quit date: 8/1/2016    Smokeless tobacco: Never Used    Alcohol use 0.0 oz/week      Comment: rare- holiday    Drug use: No    Sexual activity: Not on file     Review of Systems   Constitution: Negative for chills and fever.   HENT: Negative for hoarse voice and sore throat.    Cardiovascular: Negative for chest pain, claudication, cyanosis, dyspnea on exertion, irregular heartbeat, leg swelling, near-syncope, orthopnea, palpitations, paroxysmal nocturnal dyspnea and syncope.   Respiratory: Negative for cough, hemoptysis and shortness of breath.    Musculoskeletal: Negative for back pain, joint pain and joint swelling.   Gastrointestinal: Negative for abdominal pain, constipation, diarrhea, hematochezia, melena, nausea and vomiting.   Genitourinary: Negative for dysuria, hematuria and incomplete emptying.   Neurological: Negative for  dizziness, headaches and light-headedness.   Psychiatric/Behavioral: Negative for altered mental status, depression and suicidal ideas. The patient is not nervous/anxious.      Objective:     Vital Signs (Most Recent):  Temp: 98.1 °F (36.7 °C) (06/15/18 0623)  Pulse: 83 (06/15/18 0623)  Resp: 16 (06/15/18 0623)  BP: (!) 100/54 (06/15/18 0628)  SpO2: 95 % (06/15/18 0623) Vital Signs (24h Range):  Temp:  [98.1 °F (36.7 °C)] 98.1 °F (36.7 °C)  Pulse:  [83] 83  Resp:  [16] 16  SpO2:  [95 %] 95 %  BP: (100-107)/(54-56) 100/54     Weight: 78 kg (172 lb)  Body mass index is 31.46 kg/m².    SpO2: 95 %  O2 Device (Oxygen Therapy): room air    Physical Exam   Constitutional: She is oriented to person, place, and time. She appears well-developed and well-nourished. No distress.   HENT:   Head: Normocephalic and atraumatic.   Right Ear: External ear normal.   Left Ear: External ear normal.   Nose: Nose normal.   Mouth/Throat: Oropharynx is clear and moist. No oropharyngeal exudate.   Eyes: Conjunctivae and EOM are normal. Pupils are equal, round, and reactive to light. Right eye exhibits no discharge. Left eye exhibits no discharge. No scleral icterus.   Neck: Normal range of motion. Neck supple. No JVD present. No tracheal deviation present. No thyromegaly present.   Cardiovascular: Normal rate, regular rhythm, normal heart sounds and intact distal pulses.  Exam reveals no gallop and no friction rub.    No murmur heard.  Pulmonary/Chest: Effort normal and breath sounds normal. No stridor. No respiratory distress. She has no wheezes. She has no rales. She exhibits no tenderness.   Abdominal: Soft. Bowel sounds are normal. She exhibits no distension and no mass. There is no tenderness. There is no rebound and no guarding.   Musculoskeletal: Normal range of motion. She exhibits no edema, tenderness or deformity.   Lymphadenopathy:     She has no cervical adenopathy.   Neurological: She is alert and oriented to person, place, and  time. No cranial nerve deficit.   Skin: Skin is warm and dry. No rash noted. She is not diaphoretic. No erythema. No pallor.   Psychiatric: She has a normal mood and affect. Her behavior is normal. Thought content normal.   Nursing note and vitals reviewed.        Assessment and Plan:     Malfunction of implantable defibrillator ventricular (ICD) lead    - Proceed with HBP lead revision today  - Ancef prior to procedure    We discussed the alternatives, benefits and risks of the procedure including pain, infection, bleeding, injury to lung causing pneumothorax requiring tube placement, injury to heart valves, puncture of the heart leading to pericardial effusion or tamponade requiring tube drainage, heart attack, stroke and death.              Juan Chavez MD  Cardiac Electrophysiology  [facility]

## 2018-06-15 NOTE — NURSING
Pt arrived to unit via stretcher from EP.  Pt aaox3, vss, no s/s of distress noted.  Pt placed on telemetry.  Left chest wall inc assessed, no dressing, but area cdi.  No bleeding noted.  Pt denies pain at this time.  Call light placed within reach.  Family at bedside.

## 2018-06-16 VITALS
DIASTOLIC BLOOD PRESSURE: 60 MMHG | SYSTOLIC BLOOD PRESSURE: 124 MMHG | TEMPERATURE: 97 F | BODY MASS INDEX: 31.65 KG/M2 | OXYGEN SATURATION: 93 % | RESPIRATION RATE: 20 BRPM | WEIGHT: 172 LBS | HEIGHT: 62 IN | HEART RATE: 91 BPM

## 2018-06-16 LAB — POCT GLUCOSE: 116 MG/DL (ref 70–110)

## 2018-06-16 PROCEDURE — 25000242 PHARM REV CODE 250 ALT 637 W/ HCPCS: Performed by: INTERNAL MEDICINE

## 2018-06-16 PROCEDURE — 93005 ELECTROCARDIOGRAM TRACING: CPT

## 2018-06-16 PROCEDURE — 94640 AIRWAY INHALATION TREATMENT: CPT

## 2018-06-16 PROCEDURE — 93010 ELECTROCARDIOGRAM REPORT: CPT | Mod: ,,, | Performed by: INTERNAL MEDICINE

## 2018-06-16 PROCEDURE — 25000003 PHARM REV CODE 250: Performed by: INTERNAL MEDICINE

## 2018-06-16 PROCEDURE — 82962 GLUCOSE BLOOD TEST: CPT

## 2018-06-16 RX ORDER — CEPHALEXIN 500 MG/1
500 CAPSULE ORAL EVERY 8 HOURS
Qty: 15 CAPSULE | Refills: 0 | Status: SHIPPED | OUTPATIENT
Start: 2018-06-16 | End: 2018-09-25

## 2018-06-16 RX ADMIN — IPRATROPIUM BROMIDE AND ALBUTEROL SULFATE 3 ML: .5; 3 SOLUTION RESPIRATORY (INHALATION) at 08:06

## 2018-06-16 RX ADMIN — CEPHALEXIN 500 MG: 250 CAPSULE ORAL at 09:06

## 2018-06-16 RX ADMIN — BUDESONIDE 2 PUFF: 180 AEROSOL, POWDER RESPIRATORY (INHALATION) at 09:06

## 2018-06-16 RX ADMIN — CARVEDILOL 25 MG: 25 TABLET, FILM COATED ORAL at 09:06

## 2018-06-16 NOTE — PLAN OF CARE
Problem: Patient Care Overview  Goal: Plan of Care Review  Outcome: Ongoing (interventions implemented as appropriate)  Patient has remained free of falls this shift. She is AAOx4 and VS's have been stable. This patient is concerned about herself and her . She is in a good mood regardless.

## 2018-06-16 NOTE — PROGRESS NOTES
[facility]  Cardiac Electrophysiology  Progress Note    Admission Date: 6/15/2018  Code Status: Full Code   Attending Physician: Javy Gates MD   Expected Discharge Date: 6/16/2018  Principal Problem:<principal problem not specified>    Subjective:     Interval History: NAEON. Telemetry and ECG reveals HBP with narrow QRS.     Review of Systems   Constitution: Negative for chills and fever.   HENT: Negative for hoarse voice and sore throat.    Cardiovascular: Negative for chest pain, claudication, cyanosis, dyspnea on exertion, irregular heartbeat, leg swelling, near-syncope, orthopnea, palpitations, paroxysmal nocturnal dyspnea and syncope.   Respiratory: Negative for cough, hemoptysis and shortness of breath.    Musculoskeletal: Negative for back pain, joint pain and joint swelling.   Gastrointestinal: Negative for abdominal pain, constipation, diarrhea, hematochezia, melena, nausea and vomiting.   Genitourinary: Negative for dysuria, hematuria and incomplete emptying.   Neurological: Negative for dizziness, headaches and light-headedness.   Psychiatric/Behavioral: Negative for altered mental status, depression and suicidal ideas. The patient is not nervous/anxious.      Objective:     Vital Signs (Most Recent):  Temp: 96.1 °F (35.6 °C) (06/16/18 0826)  Pulse: 96 (06/16/18 1000)  Resp: 18 (06/16/18 0918)  BP: 130/75 (06/16/18 0826)  SpO2: 96 % (06/16/18 0826) Vital Signs (24h Range):  Temp:  [96.1 °F (35.6 °C)-100.1 °F (37.8 °C)] 96.1 °F (35.6 °C)  Pulse:  [65-98] 96  Resp:  [14-21] 18  SpO2:  [92 %-96 %] 96 %  BP: ()/(46-75) 130/75     Weight: 78 kg (172 lb)  Body mass index is 31.46 kg/m².     SpO2: 96 %  O2 Device (Oxygen Therapy): room air    Physical Exam   Constitutional: She is oriented to person, place, and time. She appears well-developed and well-nourished. No distress.   HENT:   Head: Normocephalic and atraumatic.   Right Ear: External ear normal.   Left Ear: External  ear normal.   Nose: Nose normal.   Mouth/Throat: Oropharynx is clear and moist. No oropharyngeal exudate.   Eyes: Conjunctivae and EOM are normal. Pupils are equal, round, and reactive to light. Right eye exhibits no discharge. Left eye exhibits no discharge. No scleral icterus.   Neck: Normal range of motion. Neck supple. No JVD present. No tracheal deviation present. No thyromegaly present.   Cardiovascular: Normal rate, regular rhythm, normal heart sounds and intact distal pulses.  Exam reveals no gallop and no friction rub.    No murmur heard.  Pulmonary/Chest: Effort normal and breath sounds normal. No stridor. No respiratory distress. She has no wheezes. She has no rales. She exhibits no tenderness.   Abdominal: Soft. Bowel sounds are normal. She exhibits no distension and no mass. There is no tenderness. There is no rebound and no guarding.   Musculoskeletal: Normal range of motion. She exhibits no edema, tenderness or deformity.   Lymphadenopathy:     She has no cervical adenopathy.   Neurological: She is alert and oriented to person, place, and time. No cranial nerve deficit.   Skin: Skin is warm and dry. No rash noted. She is not diaphoretic. No erythema. No pallor.   L upper chest ICD site c/d/i. No hematoma. No TTP     Psychiatric: She has a normal mood and affect. Her behavior is normal. Thought content normal.   Nursing note and vitals reviewed.        Assessment and Plan:     Malfunction of implantable defibrillator ventricular (ICD) lead    S/p HB lead revision  Did well overnight  DC home today, Keflex x 5 days. Follow-up in device clinic in 1 week and with Dr. Gates in 4 weeks.              Juan Chavez MD  Cardiac Electrophysiology  [facility]

## 2018-06-16 NOTE — PROGRESS NOTES
Pt discharged and left unit via wheelchair with daughter and son in law. Pt's VSS, NAD noted. IV removed with catheter intact. Discharge instructions reviewed and pt verbalized understanding.

## 2018-06-16 NOTE — PLAN OF CARE
Problem: Patient Care Overview  Goal: Plan of Care Review  Outcome: Ongoing (interventions implemented as appropriate)  Pt's VSS, NAD noted. Pt is sinus rhythm on cardiac monitor. Pt aware awaiting discharge. Pt states her daughter is on the way. Bed locked in lowest position, side rails upx2, call bell within reach, nonskid socks on pt. Pt denies pain at this time. Left upper chest incision is clean, dry, and intact with a small amount of swelling since yesterday.

## 2018-06-16 NOTE — ASSESSMENT & PLAN NOTE
S/p HB lead revision  Did well overnight  DC home today, Keflex x 5 days. Follow-up in device clinic in 1 week and with Dr. Gates in 4 weeks.

## 2018-06-16 NOTE — SUBJECTIVE & OBJECTIVE
Interval History: NAEON. Telemetry and ECG reveals HBP with narrow QRS.     Review of Systems   Constitution: Negative for chills and fever.   HENT: Negative for hoarse voice and sore throat.    Cardiovascular: Negative for chest pain, claudication, cyanosis, dyspnea on exertion, irregular heartbeat, leg swelling, near-syncope, orthopnea, palpitations, paroxysmal nocturnal dyspnea and syncope.   Respiratory: Negative for cough, hemoptysis and shortness of breath.    Musculoskeletal: Negative for back pain, joint pain and joint swelling.   Gastrointestinal: Negative for abdominal pain, constipation, diarrhea, hematochezia, melena, nausea and vomiting.   Genitourinary: Negative for dysuria, hematuria and incomplete emptying.   Neurological: Negative for dizziness, headaches and light-headedness.   Psychiatric/Behavioral: Negative for altered mental status, depression and suicidal ideas. The patient is not nervous/anxious.      Objective:     Vital Signs (Most Recent):  Temp: 96.1 °F (35.6 °C) (06/16/18 0826)  Pulse: 96 (06/16/18 1000)  Resp: 18 (06/16/18 0918)  BP: 130/75 (06/16/18 0826)  SpO2: 96 % (06/16/18 0826) Vital Signs (24h Range):  Temp:  [96.1 °F (35.6 °C)-100.1 °F (37.8 °C)] 96.1 °F (35.6 °C)  Pulse:  [65-98] 96  Resp:  [14-21] 18  SpO2:  [92 %-96 %] 96 %  BP: ()/(46-75) 130/75     Weight: 78 kg (172 lb)  Body mass index is 31.46 kg/m².     SpO2: 96 %  O2 Device (Oxygen Therapy): room air    Physical Exam   Constitutional: She is oriented to person, place, and time. She appears well-developed and well-nourished. No distress.   HENT:   Head: Normocephalic and atraumatic.   Right Ear: External ear normal.   Left Ear: External ear normal.   Nose: Nose normal.   Mouth/Throat: Oropharynx is clear and moist. No oropharyngeal exudate.   Eyes: Conjunctivae and EOM are normal. Pupils are equal, round, and reactive to light. Right eye exhibits no discharge. Left eye exhibits no discharge. No scleral icterus.    Neck: Normal range of motion. Neck supple. No JVD present. No tracheal deviation present. No thyromegaly present.   Cardiovascular: Normal rate, regular rhythm, normal heart sounds and intact distal pulses.  Exam reveals no gallop and no friction rub.    No murmur heard.  Pulmonary/Chest: Effort normal and breath sounds normal. No stridor. No respiratory distress. She has no wheezes. She has no rales. She exhibits no tenderness.   Abdominal: Soft. Bowel sounds are normal. She exhibits no distension and no mass. There is no tenderness. There is no rebound and no guarding.   Musculoskeletal: Normal range of motion. She exhibits no edema, tenderness or deformity.   Lymphadenopathy:     She has no cervical adenopathy.   Neurological: She is alert and oriented to person, place, and time. No cranial nerve deficit.   Skin: Skin is warm and dry. No rash noted. She is not diaphoretic. No erythema. No pallor.   L upper chest ICD site c/d/i. No hematoma. No TTP     Psychiatric: She has a normal mood and affect. Her behavior is normal. Thought content normal.   Nursing note and vitals reviewed.

## 2018-06-16 NOTE — DISCHARGE SUMMARY
Discharge Summary  Electrophysiology      Admit Date: 6/15/2018    Discharge Date:  6/16/2018    Attending Physician: Javy Gates MD    Discharge Physician: Juan Chavez MD    Principal Diagnoses: NICM  Indication for Admission: His lead revision    Discharged Condition: Good    Hospital Course:   Patient presented for outpatient His lead revision which went without complication. .    Outpatient Plan:  - Post device 1 week wound check then 3 month follow-up with EKG and office visit  - Medication changes/ additions include: Keflex x 5 days    Diet: Cardiac diet    Activity: Ad miguel, wound care instructions provided    Disposition: Home or Self Care    Discharge Medications:      Medication List      START taking these medications    cephALEXin 500 MG capsule  Commonly known as:  KEFLEX  Take 1 capsule (500 mg total) by mouth every 8 (eight) hours.        CHANGE how you take these medications    sertraline 100 MG tablet  Commonly known as:  ZOLOFT  TAKE ONE TABLET BY MOUTH EVERY MORNING  What changed:  See the new instructions.        CONTINUE taking these medications    albuterol 90 mcg/actuation inhaler  Commonly known as:  PROAIR HFA  Inhale 2 puffs into the lungs every 6 (six) hours as needed. Rescue     albuterol-ipratropium 2.5 mg-0.5 mg/3 mL nebulizer solution  Commonly known as:  DUO-NEB  Take 3 mLs by nebulization every 6 (six) hours as needed for Wheezing. Rescue     azelastine 137 mcg (0.1 %) nasal spray  Commonly known as:  ASTELIN  1 spray (137 mcg total) by Nasal route 2 (two) times daily.     carvedilol 25 MG tablet  Commonly known as:  COREG  TAKE 1 TABLET(25 MG) BY MOUTH TWICE DAILY     cetirizine 10 MG tablet  Commonly known as:  ZYRTEC     fluticasone 50 mcg/actuation nasal spray  Commonly known as:  FLONASE  Use 1 SPRAY IN each NOSTRIL TWICE DAILY     furosemide 40 MG tablet  Commonly known as:  LASIX  TAKE 1 TABLET(40 MG) BY MOUTH TWICE DAILY     metFORMIN 500 MG tablet  Commonly  known as:  GLUCOPHAGE  TAKE ONE Tablet BY MOUTH TWICE DAILY     PULMICORT FLEXHALER 180 mcg/actuation Aepb  Generic drug:  budesonide 180mcg  INHALE 2 PUFFS BY MOUTH TWICE DAILY     TRUETEST TEST STRIPS Strp  Generic drug:  blood sugar diagnostic  test once EVERY MORNING           Where to Get Your Medications      These medications were sent to [id number] [address] [facility] [address] [address]    Phone:  [phone]   · cephALEXin 500 MG capsule

## 2018-06-16 NOTE — PROGRESS NOTES
At 12:02, On call EP aware that the pt's repeat EKG was delayed in uploading to MUSE (due to transmission issues per EKG tech) and nurse did not see physical EKG until pt was discharged. MD informed that repeat EKG was done this morning and abnormal. MD stated he would look at the EKG. At 1839, Dr. Juan Chavez informed nurse he did review the EKG and stated it was okay.

## 2018-06-22 ENCOUNTER — CLINICAL SUPPORT (OUTPATIENT)
Dept: ELECTROPHYSIOLOGY | Facility: CLINIC | Age: 63
End: 2018-06-22
Attending: INTERNAL MEDICINE
Payer: COMMERCIAL

## 2018-06-22 DIAGNOSIS — I42.0 DILATED CARDIOMYOPATHY: ICD-10-CM

## 2018-06-22 DIAGNOSIS — I44.7 LEFT BUNDLE-BRANCH BLOCK: ICD-10-CM

## 2018-06-22 PROCEDURE — 93284 PRGRMG EVAL IMPLANTABLE DFB: CPT | Mod: S$GLB,,, | Performed by: INTERNAL MEDICINE

## 2018-07-13 ENCOUNTER — TELEPHONE (OUTPATIENT)
Dept: FAMILY MEDICINE | Facility: CLINIC | Age: 63
End: 2018-07-13

## 2018-07-13 DIAGNOSIS — E11.9 TYPE 2 DIABETES MELLITUS WITHOUT COMPLICATION: ICD-10-CM

## 2018-07-13 DIAGNOSIS — I42.0 DILATED CARDIOMYOPATHY: Primary | ICD-10-CM

## 2018-07-13 RX ORDER — LISINOPRIL 10 MG/1
10 TABLET ORAL DAILY
Qty: 90 TABLET | Refills: 3 | Status: SHIPPED | OUTPATIENT
Start: 2018-07-13 | End: 2020-11-10 | Stop reason: CLARIF

## 2018-07-13 NOTE — TELEPHONE ENCOUNTER
Dr Chavez, pt called to say that her cardiologist (Yanick Gates) wanted her to notify you about her blood pressure 165/90 last night and 146/89 this morning. She takes Coreg and Lasix. She want to know what to do

## 2018-08-03 RX ORDER — CARVEDILOL 25 MG/1
TABLET ORAL
Qty: 60 TABLET | Refills: 6 | Status: SHIPPED | OUTPATIENT
Start: 2018-08-03 | End: 2019-03-04 | Stop reason: SDUPTHER

## 2018-08-03 RX ORDER — FUROSEMIDE 40 MG/1
TABLET ORAL
Qty: 60 TABLET | Refills: 6 | Status: SHIPPED | OUTPATIENT
Start: 2018-08-03 | End: 2019-03-04 | Stop reason: SDUPTHER

## 2018-09-18 ENCOUNTER — PATIENT MESSAGE (OUTPATIENT)
Dept: FAMILY MEDICINE | Facility: CLINIC | Age: 63
End: 2018-09-18

## 2018-09-19 RX ORDER — SERTRALINE HYDROCHLORIDE 100 MG/1
TABLET, FILM COATED ORAL
Qty: 90 TABLET | Refills: 1 | Status: SHIPPED | OUTPATIENT
Start: 2018-09-19 | End: 2019-03-08 | Stop reason: SDUPTHER

## 2018-09-24 DIAGNOSIS — C50.919 MALIGNANT NEOPLASM OF FEMALE BREAST, UNSPECIFIED ESTROGEN RECEPTOR STATUS, UNSPECIFIED LATERALITY, UNSPECIFIED SITE OF BREAST: Primary | ICD-10-CM

## 2018-09-24 DIAGNOSIS — Z00.6 EXAMINATION OF PARTICIPANT IN CLINICAL TRIAL: ICD-10-CM

## 2018-09-25 ENCOUNTER — OFFICE VISIT (OUTPATIENT)
Dept: ELECTROPHYSIOLOGY | Facility: CLINIC | Age: 63
End: 2018-09-25
Payer: COMMERCIAL

## 2018-09-25 ENCOUNTER — HOSPITAL ENCOUNTER (OUTPATIENT)
Dept: CARDIOLOGY | Facility: CLINIC | Age: 63
Discharge: HOME | End: 2018-09-25
Payer: COMMERCIAL

## 2018-09-25 ENCOUNTER — LAB VISIT (OUTPATIENT)
Dept: LAB | Facility: HOSPITAL | Age: 63
End: 2018-09-25
Attending: INTERNAL MEDICINE
Payer: COMMERCIAL

## 2018-09-25 ENCOUNTER — CLINICAL SUPPORT (OUTPATIENT)
Dept: ELECTROPHYSIOLOGY | Facility: CLINIC | Age: 63
End: 2018-09-25
Payer: COMMERCIAL

## 2018-09-25 VITALS
HEIGHT: 62 IN | DIASTOLIC BLOOD PRESSURE: 76 MMHG | BODY MASS INDEX: 32.86 KG/M2 | HEART RATE: 80 BPM | SYSTOLIC BLOOD PRESSURE: 110 MMHG | WEIGHT: 178.56 LBS

## 2018-09-25 DIAGNOSIS — I44.7 LEFT BUNDLE-BRANCH BLOCK: ICD-10-CM

## 2018-09-25 DIAGNOSIS — I42.0 DILATED CARDIOMYOPATHY: ICD-10-CM

## 2018-09-25 DIAGNOSIS — C50.919 MALIGNANT NEOPLASM OF FEMALE BREAST, UNSPECIFIED ESTROGEN RECEPTOR STATUS, UNSPECIFIED LATERALITY, UNSPECIFIED SITE OF BREAST: ICD-10-CM

## 2018-09-25 DIAGNOSIS — I42.8 NICM (NONISCHEMIC CARDIOMYOPATHY): Primary | ICD-10-CM

## 2018-09-25 DIAGNOSIS — Z00.6 EXAMINATION OF PARTICIPANT IN CLINICAL TRIAL: ICD-10-CM

## 2018-09-25 DIAGNOSIS — I10 ESSENTIAL HYPERTENSION: ICD-10-CM

## 2018-09-25 DIAGNOSIS — I44.7 LBBB (LEFT BUNDLE BRANCH BLOCK): ICD-10-CM

## 2018-09-25 LAB
DRUG STUDY SPECIMEN TYPE: NORMAL
DRUG STUDY TEST NAME: NORMAL
DRUG STUDY TEST RESULT: NORMAL

## 2018-09-25 PROCEDURE — 3008F BODY MASS INDEX DOCD: CPT | Mod: CPTII,S$GLB,, | Performed by: INTERNAL MEDICINE

## 2018-09-25 PROCEDURE — 99999 PR PBB SHADOW E&M-EST. PATIENT-LVL III: CPT | Mod: PBBFAC,,, | Performed by: INTERNAL MEDICINE

## 2018-09-25 PROCEDURE — 93284 PRGRMG EVAL IMPLANTABLE DFB: CPT | Mod: S$GLB,,, | Performed by: INTERNAL MEDICINE

## 2018-09-25 PROCEDURE — 3078F DIAST BP <80 MM HG: CPT | Mod: CPTII,S$GLB,, | Performed by: INTERNAL MEDICINE

## 2018-09-25 PROCEDURE — 93000 ELECTROCARDIOGRAM COMPLETE: CPT | Mod: S$GLB,,, | Performed by: INTERNAL MEDICINE

## 2018-09-25 PROCEDURE — 3074F SYST BP LT 130 MM HG: CPT | Mod: CPTII,S$GLB,, | Performed by: INTERNAL MEDICINE

## 2018-09-25 PROCEDURE — 99214 OFFICE O/P EST MOD 30 MIN: CPT | Mod: S$GLB,,, | Performed by: INTERNAL MEDICINE

## 2018-09-25 PROCEDURE — 36415 COLL VENOUS BLD VENIPUNCTURE: CPT

## 2018-09-25 PROCEDURE — 99000 SPECIMEN HANDLING OFFICE-LAB: CPT

## 2018-09-25 RX ORDER — MULTIVITAMIN
1 TABLET ORAL DAILY
COMMUNITY

## 2018-09-25 NOTE — PROGRESS NOTES
Subjective:    Patient ID:  Hannah Montoya is a 63 y.o. female who presents for follow-up of Cardiomyopathy      63 yoF NICM, LBBB s/p CRT-D here for second opinion regarding transvenous LV lead placement. She has an attempt at an LV lead 8/10/17 by Dr Lopez. The attempt was complicated by tortuous venous anatomy and inability to pass an LV lead. Venograms show mid and anterolateral branches with tortuous courses. The mid lateral branch was accessed with a 014 wire but the lead could not be advanced. She had an epicardial lead implanted however she has elevated thresholds, 5.0 V @ 1.2 ms. She has expected 1-1.5 y on her battery life giving her overall ~2 years with her current device. She has mild improvement in symptoms. QRS went from 168 to 150 ms with CRT pacing. She is here for a second opinion.     Interval history: Attempt at His bundle lead 5/2/18 resulting in dislodgement. Re-attempt 6/15/18 with successful lead placement. Marked improvement in symptoms. ECG with QRS 84 ms. Normal CRT-D function (His lead in LV port).     Echo 3/30/17:  CONCLUSIONS     1 - Severely depressed left ventricular systolic function (EF 20-25%).     2 - Eccentric hypertrophy.     3 - Severe left atrial enlargement.     4 - Left ventricular diastolic dysfunction.     5 - Severely depressed right ventricular systolic function .     6 - Pulmonary hypertension. The estimated PA systolic pressure is 47 mmHg.     7 - Moderate mitral regurgitation.     8 - Intermediate central venous pressure.     9 - Small pericardial effusion.     Past Medical History:  No date: Asthma      Comment:  bronchitis in past  2016: Breast cancer      Comment:  right  No date: Cardiomyopathy  No date: COPD (chronic obstructive pulmonary disease)  No date: Diabetes mellitus, type 2  No date: Hyperglycemia  No date: Hyperlipidemia  No date: Hypertension    Past Surgical History:  2/29/2016: BIOPSY-SENTINEL NODE; Right      Comment:  Performed by Robbi  RACHEL Bailey MD at [facility] OR [address] [place] [address]  2016: BREAST BIOPSY; Right      Comment:  [facility]  No date: BREAST LUMPECTOMY; Right  No date:  SECTION      Comment:  x2  No date: CHOLECYSTECTOMY  2017: HEART CATH-BILATERAL; Bilateral      Comment:  Performed by Kevin Lopez MD at [facility] CATH LAB  2016: INJECTION-SENTINEL NODE; Right      Comment:  Performed by Robbi Bailey MD at [facility] OR [address] [place] [address]  2018: INSERTION-ICD-BIVENTRICULAR; N/A      Comment:  Performed by Javy Gates MD at [facility] CATH LAB  8/10/2017: INSERTION-ICD-BIVENTRICULAR; N/A      Comment:  Performed by Kevin Lopez MD at [facility] CATH LAB  2016: LUMPECTOMY-BREAST-w/wire localization ; Right      Comment:  Performed by Robbi Bailey MD at [facility] OR [address] [place] [address]  6/15/2018: REVISION OF IMPLANTABLE CARDIOVERTER-DEFIBRILLATOR (ICD)   ELECTRODE LEAD PLACEMENT; N/A      Comment:  Procedure: REVISION-LEAD-ICD;  Surgeon: Javy Gates MD;  Location: [facility] CATH LAB;  Service:                Cardiology;  Laterality: N/A;  LBBB, Bi-V ICD HIS Ld rev,               MDT, Choice, MB, 3 Prep  6/15/2018: REVISION-LEAD-ICD; N/A      Comment:  Performed by Javy Gates MD at [facility] CATH LAB  No date: TUBAL LIGATION    Social History    Socioeconomic History      Marital status:       Spouse name: Not on file      Number of children: Not on file      Years of education: Not on file      Highest education level: Not on file    Social Needs      Financial resource strain: Not on file      Food insecurity - worry: Not on file      Food insecurity - inability: Not on file      Transportation needs - medical: Not on file      Transportation needs - non-medical: Not on file    Occupational History        Employer: [employer]    Tobacco Use      Smoking status: Former Smoker        Packs/day: 0.50        Years: 40.00        Pack years: 20        Quit date: 2016        Years since quittin.1      Smokeless  tobacco: Never Used    Substance and Sexual Activity      Alcohol use: Yes        Alcohol/week: 0.0 oz        Comment: rare- holiday      Drug use: No      Sexual activity: Not on file    Other Topics      Concerns:        Not on file    Social History Narrative      Not on file      Review of patient's family history indicates:  Problem: Kidney disease      Relation: Mother          Age of Onset: (Not Specified)  Problem: Kidney disease      Relation: Father          Age of Onset: (Not Specified)  Problem: Clotting disorder      Relation: Brother          Age of Onset: (Not Specified)  Problem: Breast cancer      Relation: Neg Hx          Age of Onset: (Not Specified)  Problem: Ovarian cancer      Relation: Neg Hx          Age of Onset: (Not Specified)          Review of Systems   Constitution: Negative for weakness and malaise/fatigue.   HENT: Negative.    Eyes: Negative.    Cardiovascular: Negative for chest pain, dyspnea on exertion, leg swelling, near-syncope, palpitations and syncope.   Respiratory: Negative.  Negative for shortness of breath.    Endocrine: Negative.    Hematologic/Lymphatic: Negative.    Skin: Negative.    Musculoskeletal: Negative.    Gastrointestinal: Negative.    Genitourinary: Negative.    Neurological: Negative for dizziness and light-headedness.   Psychiatric/Behavioral: Negative.    Allergic/Immunologic: Negative.         Objective:    Physical Exam   Constitutional: She is oriented to person, place, and time. She appears well-developed and well-nourished. No distress.   HENT:   Head: Normocephalic and atraumatic.   Eyes: EOM are normal. Pupils are equal, round, and reactive to light. Right eye exhibits no discharge. Left eye exhibits no discharge.   Neck: Normal range of motion. No JVD present. No thyromegaly present.   Cardiovascular: Normal rate, regular rhythm, S1 normal, S2 normal and normal heart sounds. PMI is not displaced. Exam reveals no gallop and no friction rub.   No  murmur heard.  Pulmonary/Chest: Effort normal and breath sounds normal. No respiratory distress. She has no wheezes. She has no rales.   L upper chest incision with underlying generator   Abdominal: Soft. Bowel sounds are normal. She exhibits no distension. There is no tenderness. There is no rebound and no guarding.   Musculoskeletal: Normal range of motion. She exhibits no edema or tenderness.   Neurological: She is alert and oriented to person, place, and time. No cranial nerve deficit.   Skin: Skin is warm and dry. No rash noted. She is not diaphoretic. No erythema.   Psychiatric: She has a normal mood and affect. Her behavior is normal. Judgment and thought content normal.   Vitals reviewed.    ECG: NSR nl CO, QRS 84 ms paced, nl QTc        Assessment:       1. NICM (nonischemic cardiomyopathy)    2. Left bundle-branch block    3. Essential hypertension    4. Dilated cardiomyopathy         Plan:       63 yoF NICM, LBBB s/p CRT-D with His lead here for follow up. Normal His-ICD function with no sustained arrhythmias. Will assess EF with echo. I discussed routine device follow up including quarterly to bi-annual device checks for device function as well as yearly follow up in the EP clinic. The patient  was advised to call with any concerns regarding their device. Device clinic follow up as scheduled. RTC 1y

## 2018-09-27 ENCOUNTER — OFFICE VISIT (OUTPATIENT)
Dept: OPTOMETRY | Facility: CLINIC | Age: 63
End: 2018-09-27
Payer: COMMERCIAL

## 2018-09-27 DIAGNOSIS — H25.13 NUCLEAR SCLEROSIS OF BOTH EYES: ICD-10-CM

## 2018-09-27 DIAGNOSIS — E11.9 TYPE 2 DIABETES MELLITUS WITHOUT RETINOPATHY: Primary | ICD-10-CM

## 2018-09-27 DIAGNOSIS — H52.7 REFRACTIVE ERROR: ICD-10-CM

## 2018-09-27 LAB
LEFT EYE DM RETINOPATHY: NEGATIVE
RIGHT EYE DM RETINOPATHY: NEGATIVE

## 2018-09-27 PROCEDURE — 92015 DETERMINE REFRACTIVE STATE: CPT | Mod: S$GLB,,, | Performed by: OPTOMETRIST

## 2018-09-27 PROCEDURE — 99999 PR PBB SHADOW E&M-EST. PATIENT-LVL I: CPT | Mod: PBBFAC,,, | Performed by: OPTOMETRIST

## 2018-09-27 PROCEDURE — 92004 COMPRE OPH EXAM NEW PT 1/>: CPT | Mod: S$GLB,,, | Performed by: OPTOMETRIST

## 2018-09-27 NOTE — PROGRESS NOTES
Subjective:       Patient ID: Hannah Montoya is a 63 y.o. female      Chief Complaint   Patient presents with    Concerns About Ocular Health     History of Present Illness  Last eye exam x 2 years ago    63 y.o. female here for diabetic eye exam.  Pt states OU vision stable. Wear eyeglasses full time.  No eye drops.  BS- stable, 114 yesterday    Hemoglobin A1C       Date                     Value               Ref Range           Status              12/28/2017               6.0 (H)             4.0 - 5.6 %         Final              01/04/2017               6.2                 4.5 - 6.2 %         Final              07/05/2016               6.3 (H)             4.5 - 6.2 %         Final            ----------     Assessment/Plan:     1. Type 2 diabetes mellitus without retinopathy  No diabetic retinopathy. Discussed with pt the effects of diabetes on vision, importance of good blood sugar control, compliance with meds, and follow up care with PCP. Return in 1 year for dilated eye exam, sooner PRN.    2. Nuclear sclerosis of both eyes  Educated pt on presence of cataracts and effects on vision. No surgery at this time. Recheck in one year.    3. Refractive error  Educated patient on refractive error and discussed lens options. Dispensed updated spectacle Rx. Educated about adaptation period to new specs.    Eyeglass Final Rx     Eyeglass Final Rx       Sphere Cylinder Axis Add    Right -4.00 +2.00 090 +2.50    Left -4.00 +1.50 075 +2.50    Expiration Date:  9/28/2019                  Follow-up in about 1 year (around 9/27/2019) for Diabetic Eye Exam.

## 2018-09-27 NOTE — LETTER
September 27, 2018      Emanuel Chavez, PT  [address]  [place] [address]           [facility]  [address]  [place] [address]  Phone: [phone]  Fax: [phone]          Patient: Hannah Montoya   MR Number: [id number]   YOB: 1955   Date of Visit: 9/27/2018       Dear Emanuel Page:    Thank you for referring Hannah Montoya to me for evaluation. Attached you will find relevant portions of my assessment and plan of care.    If you have questions, please do not hesitate to call me. I look forward to following Hannah Montoya along with you.    Sincerely,    Julianne Juarez, OD    Enclosure  CC:  No Recipients    If you would like to receive this communication electronically, please contact [email or web address] or [phone] to request more information on [employer] access.    For providers and/or their staff who would like to refer a patient to [facility], please contact us through our one-stop-shop provider referral line, [facility], at [phone].    If you feel you have received this communication in error or would no longer like to receive these types of communications, please e-mail [email or web address]

## 2018-09-28 ENCOUNTER — OFFICE VISIT (OUTPATIENT)
Dept: FAMILY MEDICINE | Facility: CLINIC | Age: 63
End: 2018-09-28
Payer: COMMERCIAL

## 2018-09-28 VITALS
DIASTOLIC BLOOD PRESSURE: 88 MMHG | OXYGEN SATURATION: 95 % | HEART RATE: 77 BPM | RESPIRATION RATE: 12 BRPM | WEIGHT: 180.75 LBS | SYSTOLIC BLOOD PRESSURE: 130 MMHG | BODY MASS INDEX: 33.26 KG/M2 | TEMPERATURE: 98 F | HEIGHT: 62 IN

## 2018-09-28 DIAGNOSIS — C50.811 MALIGNANT NEOPLASM OF OVERLAPPING SITES OF RIGHT FEMALE BREAST, UNSPECIFIED ESTROGEN RECEPTOR STATUS: ICD-10-CM

## 2018-09-28 DIAGNOSIS — J44.9 CHRONIC OBSTRUCTIVE PULMONARY DISEASE, UNSPECIFIED COPD TYPE: ICD-10-CM

## 2018-09-28 DIAGNOSIS — I42.8 NICM (NONISCHEMIC CARDIOMYOPATHY): ICD-10-CM

## 2018-09-28 DIAGNOSIS — Z00.00 WELL WOMAN EXAM (NO GYNECOLOGICAL EXAM): Primary | ICD-10-CM

## 2018-09-28 DIAGNOSIS — E11.9 TYPE 2 DIABETES MELLITUS WITHOUT COMPLICATION, WITHOUT LONG-TERM CURRENT USE OF INSULIN: ICD-10-CM

## 2018-09-28 DIAGNOSIS — Z23 NEED FOR IMMUNIZATION AGAINST INFLUENZA: ICD-10-CM

## 2018-09-28 DIAGNOSIS — I42.0 DILATED CARDIOMYOPATHY: ICD-10-CM

## 2018-09-28 DIAGNOSIS — I10 ESSENTIAL HYPERTENSION: ICD-10-CM

## 2018-09-28 PROBLEM — I50.21 ACUTE SYSTOLIC CONGESTIVE HEART FAILURE: Status: RESOLVED | Noted: 2017-04-04 | Resolved: 2018-09-28

## 2018-09-28 PROCEDURE — 99999 PR PBB SHADOW E&M-EST. PATIENT-LVL IV: CPT | Mod: PBBFAC,,, | Performed by: FAMILY MEDICINE

## 2018-09-28 PROCEDURE — 3075F SYST BP GE 130 - 139MM HG: CPT | Mod: CPTII,S$GLB,, | Performed by: FAMILY MEDICINE

## 2018-09-28 PROCEDURE — 90686 IIV4 VACC NO PRSV 0.5 ML IM: CPT | Mod: S$GLB,,, | Performed by: FAMILY MEDICINE

## 2018-09-28 PROCEDURE — 99396 PREV VISIT EST AGE 40-64: CPT | Mod: 25,S$GLB,, | Performed by: FAMILY MEDICINE

## 2018-09-28 PROCEDURE — 90471 IMMUNIZATION ADMIN: CPT | Mod: S$GLB,,, | Performed by: FAMILY MEDICINE

## 2018-09-28 PROCEDURE — 3044F HG A1C LEVEL LT 7.0%: CPT | Mod: CPTII,S$GLB,, | Performed by: FAMILY MEDICINE

## 2018-09-28 PROCEDURE — 3079F DIAST BP 80-89 MM HG: CPT | Mod: CPTII,S$GLB,, | Performed by: FAMILY MEDICINE

## 2018-09-28 RX ORDER — ATORVASTATIN CALCIUM 10 MG/1
10 TABLET, FILM COATED ORAL DAILY
Qty: 90 TABLET | Refills: 3 | Status: SHIPPED | OUTPATIENT
Start: 2018-09-28 | End: 2019-12-09 | Stop reason: SDUPTHER

## 2018-09-28 NOTE — PROGRESS NOTES
"Well Woman VISIT      CHIEF COMPLAINT  Chief Complaint   Patient presents with    Follow-up     wants labs and influenza shot.        HPI  Hannah Montoya is a 63 y.o. female who presents for physical.     Social Factors  Tobacco use: No  Ready to Quit: No  Alcohol: No  Intimate partner violence screening  "Do you feel safe in your current relationship?" Yes   "Have you ever been in a relationship in which your partner frightened you or hurt you?" No  Living Will/POA: No  Regular Exercise: No    Depression  Over the past two weeks, have you felt down, depressed, or hopeless? No  Over the past two weeks, have you felt little interest or pleasure in doing things? No    Reproductive Health  Followed by OBGYN    CHD, HTN, DM2  CHD Risk Factors: diabetes mellitus, hypertension, obesity (BMI >= 30 kg/m2) and smoking/ tobacco exposure  Women 45 years and older should be screened for dyslipidemia if at increased risk of CHD  Women 20 to 45 years of age should be screened for dyslipidemia if at increased risk of CHD  Asymptomatic adults with sustained blood pressure greater than 135/80 mm Hg (treated or untreated) should be screened for type 2 diabetes mellitus    Estimated body mass index is 33.06 kg/m² as calculated from the following:    Height as of this encounter: 5' 2" (1.575 m).    Weight as of this encounter: 82 kg (180 lb 12.4 oz).      Screening  Mammogram needed: order 9/28/18  Colonoscopy needed: given fitkit  Osteoporosis screen needed: utd     Women 50 to 74 years of age should be screened for breast cancer with mammography biennially.  Women should be screened for cervical cancer with Pap tests beginning at 21 years of age. Low-risk women should receive Pap testing every three years. Co-testing for human papillomavirus is an option beginning at 30 years of age, and can extend the screening interval to five years. Cervical cancer screening should be discontinued at 65 years of age or after total " "hysterectomy if the woman has a benign gynecologic history  Adults 50 to 75 years of age should be screened for colorectal cancer with an FOBT annually, sigmoidoscopy every five years with an FOBT every three years, or colonoscopy every 10 years.  Women 65 years and older should be screened for osteoporosis. Women younger than 65 years should be screened if the risk of fracture is greater than or equal to that of a 65-year-old white woman without additional risk factors.      Immunizations  delayed    ALLERGIES and MEDS were verified.   PMHx, PSHx, FHx, SOCIALHx were updated as pertinent.    REVIEW OF SYSTEMS  Review of Systems   Constitutional: Negative.    HENT: Negative.    Eyes: Negative for discharge.   Respiratory: Negative for wheezing.    Cardiovascular: Negative for chest pain and palpitations.   Gastrointestinal: Negative for blood in stool, constipation, diarrhea and vomiting.   Genitourinary: Negative for hematuria.   Musculoskeletal: Negative.    Skin: Negative.    Neurological: Negative for headaches.   Endo/Heme/Allergies: Negative for polydipsia.         PHYSICAL EXAM  VITAL SIGNS: /88 (BP Location: Left arm, Patient Position: Sitting, BP Method: Medium (Manual))   Pulse 77   Temp 97.6 °F (36.4 °C) (Oral)   Resp 12   Ht 5' 2" (1.575 m)   Wt 82 kg (180 lb 12.4 oz)   LMP  (LMP Unknown)   SpO2 95%   BMI 33.06 kg/m²   GEN: Well developed, Well nourished, No acute distress.  HENT: Normocephalic, Atraumatic, Bilateral external ears normal, Nose normal, Oropharynx moist, No oral exudates.   Eyes: PERRL, EOMI, Conjunctiva normal, No discharge.   Neck: Supple, No tenderness.  Lymphatic: No cervical or supraclavicular lymphadenopathy noted.   Cardiovascular: Normal heart rate, Normal rhythm, No murmurs, No rubs, No gallops.   Thorax & Lungs: Normal breath sounds, No respiratory distress, No wheezing.  Abdomen: Soft, No tenderness, Bowel sounds normal.  Breast:deferred  Genital: deferreed   Skin: " Warm, Dry, No erythema, No rash.   Extremities: No edema, No tenderness.       ASSESSMENT/PLAN    Hannah was seen today for follow-up.    Diagnoses and all orders for this visit:    Well woman exam (no gynecological exam)  -     Lipid panel; Future  -     Hemoglobin A1c; Future  -     Comprehensive metabolic panel; Future  -     CBC auto differential; Future  -     Influenza - Quadrivalent (3 years & older) (PF)  -     Fecal Immunochemical Test (iFOBT); Future  -     Mammo Digital Screening Bilateral With CAD; Future    Type 2 diabetes mellitus without complication, without long-term current use of insulin  -     atorvastatin (LIPITOR) 10 MG tablet; Take 1 tablet (10 mg total) by mouth once daily.    Malignant neoplasm of overlapping sites of right female breast, unspecified estrogen receptor status    Chronic obstructive pulmonary disease, unspecified COPD type    Dilated cardiomyopathy    Essential hypertension    NICM (nonischemic cardiomyopathy)    Need for immunization against influenza           FOLLOW UP: 6 months or sooner if needed      Emanuel Chavez MD

## 2018-09-28 NOTE — PROGRESS NOTES
..FitKit was given to patient on 9/28/2018 1:59 PM     Patient given fit kid instructions verbally and expressed understanding

## 2018-10-01 ENCOUNTER — TELEPHONE (OUTPATIENT)
Dept: ELECTROPHYSIOLOGY | Facility: CLINIC | Age: 63
End: 2018-10-01

## 2018-10-01 NOTE — TELEPHONE ENCOUNTER
----- Message from Gifty Shell MA sent at 10/1/2018  9:59 AM CDT -----  Contact: pt [phone]      ----- Message -----  From: Stefania Camacho  Sent: 10/1/2018   9:48 AM  To: [facility] Staff    Pt would like a call in ref to a dental procedure she's having today    Thanks

## 2018-10-01 NOTE — TELEPHONE ENCOUNTER
Spoke with patient. She is on her way to see an oral surgeon and likely needs an extraction. She just wanted to make sure her pacemaker would not cause any issues. Advised that they will likely require clearance, but it should not be a problem from PPM point of view. She verbalizes understanding.

## 2018-10-16 ENCOUNTER — HOSPITAL ENCOUNTER (OUTPATIENT)
Dept: CARDIOLOGY | Facility: CLINIC | Age: 63
Discharge: HOME | End: 2018-10-16
Attending: INTERNAL MEDICINE
Payer: COMMERCIAL

## 2018-10-16 DIAGNOSIS — I42.8 NICM (NONISCHEMIC CARDIOMYOPATHY): ICD-10-CM

## 2018-10-16 LAB
AORTIC VALVE REGURGITATION: NORMAL
DIASTOLIC DYSFUNCTION: NO
ESTIMATED PA SYSTOLIC PRESSURE: 31.09
MITRAL VALVE REGURGITATION: NORMAL
RETIRED EF AND QEF - SEE NOTES: 53 (ref 55–65)
TRICUSPID VALVE REGURGITATION: NORMAL

## 2018-10-16 PROCEDURE — 93306 TTE W/DOPPLER COMPLETE: CPT | Mod: S$GLB,,, | Performed by: INTERNAL MEDICINE

## 2018-11-01 RX ORDER — METFORMIN HYDROCHLORIDE 500 MG/1
TABLET ORAL
Qty: 180 TABLET | Refills: 3 | Status: SHIPPED | OUTPATIENT
Start: 2018-11-01 | End: 2020-02-21

## 2018-12-10 ENCOUNTER — PATIENT OUTREACH (OUTPATIENT)
Dept: ADMINISTRATIVE | Facility: HOSPITAL | Age: 63
End: 2018-12-10

## 2018-12-31 DIAGNOSIS — Z95.810 CARDIAC DEFIBRILLATOR IN PLACE: Primary | ICD-10-CM

## 2019-01-07 ENCOUNTER — CLINICAL SUPPORT (OUTPATIENT)
Dept: CARDIOLOGY | Facility: HOSPITAL | Age: 64
End: 2019-01-07
Attending: INTERNAL MEDICINE
Payer: COMMERCIAL

## 2019-01-07 DIAGNOSIS — Z95.810 CARDIAC DEFIBRILLATOR IN PLACE: ICD-10-CM

## 2019-01-07 PROCEDURE — 93296 REM INTERROG EVL PM/IDS: CPT

## 2019-01-21 ENCOUNTER — OFFICE VISIT (OUTPATIENT)
Dept: FAMILY MEDICINE | Facility: CLINIC | Age: 64
End: 2019-01-21
Payer: COMMERCIAL

## 2019-01-21 VITALS
BODY MASS INDEX: 33.15 KG/M2 | OXYGEN SATURATION: 95 % | WEIGHT: 180.13 LBS | TEMPERATURE: 98 F | HEART RATE: 82 BPM | SYSTOLIC BLOOD PRESSURE: 136 MMHG | DIASTOLIC BLOOD PRESSURE: 86 MMHG | HEIGHT: 62 IN | RESPIRATION RATE: 12 BRPM

## 2019-01-21 DIAGNOSIS — F43.20 BEREAVEMENT REACTION: Primary | ICD-10-CM

## 2019-01-21 DIAGNOSIS — Z12.11 COLON CANCER SCREENING: ICD-10-CM

## 2019-01-21 DIAGNOSIS — Z63.4 BEREAVEMENT REACTION: Primary | ICD-10-CM

## 2019-01-21 PROCEDURE — 3008F PR BODY MASS INDEX (BMI) DOCUMENTED: ICD-10-PCS | Mod: CPTII,S$GLB,, | Performed by: FAMILY MEDICINE

## 2019-01-21 PROCEDURE — 3008F BODY MASS INDEX DOCD: CPT | Mod: CPTII,S$GLB,, | Performed by: FAMILY MEDICINE

## 2019-01-21 PROCEDURE — 99999 PR PBB SHADOW E&M-EST. PATIENT-LVL III: CPT | Mod: PBBFAC,,, | Performed by: FAMILY MEDICINE

## 2019-01-21 PROCEDURE — 99214 PR OFFICE/OUTPT VISIT, EST, LEVL IV, 30-39 MIN: ICD-10-PCS | Mod: S$GLB,,, | Performed by: FAMILY MEDICINE

## 2019-01-21 PROCEDURE — 3079F DIAST BP 80-89 MM HG: CPT | Mod: CPTII,S$GLB,, | Performed by: FAMILY MEDICINE

## 2019-01-21 PROCEDURE — 3075F SYST BP GE 130 - 139MM HG: CPT | Mod: CPTII,S$GLB,, | Performed by: FAMILY MEDICINE

## 2019-01-21 PROCEDURE — 3075F PR MOST RECENT SYSTOLIC BLOOD PRESS GE 130-139MM HG: ICD-10-PCS | Mod: CPTII,S$GLB,, | Performed by: FAMILY MEDICINE

## 2019-01-21 PROCEDURE — 99999 PR PBB SHADOW E&M-EST. PATIENT-LVL III: ICD-10-PCS | Mod: PBBFAC,,, | Performed by: FAMILY MEDICINE

## 2019-01-21 PROCEDURE — 99214 OFFICE O/P EST MOD 30 MIN: CPT | Mod: S$GLB,,, | Performed by: FAMILY MEDICINE

## 2019-01-21 PROCEDURE — 3079F PR MOST RECENT DIASTOLIC BLOOD PRESSURE 80-89 MM HG: ICD-10-PCS | Mod: CPTII,S$GLB,, | Performed by: FAMILY MEDICINE

## 2019-01-21 RX ORDER — TEMAZEPAM 15 MG/1
15 CAPSULE ORAL NIGHTLY PRN
Qty: 30 CAPSULE | Refills: 0 | Status: SHIPPED | OUTPATIENT
Start: 2019-01-21 | End: 2019-02-20

## 2019-01-21 SDOH — SOCIAL DETERMINANTS OF HEALTH (SDOH): DISSAPEARANCE AND DEATH OF FAMILY MEMBER: Z63.4

## 2019-01-21 NOTE — PROGRESS NOTES
"Routine Office Visit    Patient Name: Hannah Montoya    : 1955  MRN: [id number]    Subjective:  Hannah is a 63 y.o. female who presents today for:    1. Bereavement  Patient presenting today complaining of difficulty sleeping.  Her  passed 2 weeks ago.  She states that they had been  for 40 years and now it is hard to go to sleep.  She states that "we always slept holding hands".  She states that he had a lot of health issues, but in November underwent surgery for AAA repair and never woke up.  There has been no SI/HI.  She states that some nights she can sleep, but others she is up until 2-3AM.      Past Medical History  Past Medical History:   Diagnosis Date    Asthma     bronchitis in past    Breast cancer 2016    right    Cardiomyopathy     COPD (chronic obstructive pulmonary disease)     Diabetes mellitus, type 2     Hyperglycemia     Hyperlipidemia     Hypertension        Past Surgical History  Past Surgical History:   Procedure Laterality Date    BIOPSY-SENTINEL NODE Right 2016    [facility] OR 2ND FLR    [facility] BIOPSY Right 2016    [facility]    BREAST LUMPECTOMY Right      SECTION      x2    CHOLECYSTECTOMY      HEART CATH-BILATERAL Bilateral 2017    [facility]    INJECTION-SENTINEL NODE Right 2016    [facility] 2ND [facility]LR    INSERTION-ICD-BIVENTRICULAR N/A 2018    Performed by [facility] at [facility] CATH LAB    INSERTION-ICD-BIVENTRICULAR N/A 8/10/2017    [facility] C[facility]    LUMPECTOMY-BREAST-w/wire localization  Right 2016    [facility] OR 2ND FLR    REVISION-LEAD-ICD N/A 6/15/2018    Per[facility] by [facility] at [facility] CATH LAB    TUBAL LIGATION         Family History  Family History   Problem Relation Age of Onset    Kidney disease Mother     Kidney " disease Father     Clotting disorder Brother     Breast cancer Neg Hx     Ovarian cancer Neg Hx        Social History  Social History     Socioeconomic History    Marital status:      Spouse name: Not on file    Number of children: Not on file    Years of education: Not on file    Highest education level: Not on file   Social Needs    Financial resource strain: Not on file    Food insecurity - worry: Not on file    Food insecurity - inability: Not on file    Transportation needs - medical: Not on file    Transportation needs - non-medical: Not on file   Occupational History     Employer: [employer]   Tobacco Use    Smoking status: Former Smoker     Packs/day: 0.50     Years: 40.00     Pack years: 20.00     Last attempt to quit: 2016     Years since quittin.4    Smokeless tobacco: Never Used   Substance and Sexual Activity    Alcohol use: Yes     Alcohol/week: 0.0 oz     Comment: rare- holiday    Drug use: No    Sexual activity: Not on file   Other Topics Concern    Not on file   Social History Narrative    Not on file       Current Medications  Current Outpatient Medications on File Prior to Visit   Medication Sig Dispense Refill    albuterol (PROAIR HFA) 90 mcg/actuation inhaler Inhale 2 puffs into the lungs every 6 (six) hours as needed. Rescue 17 g 5    atorvastatin (LIPITOR) 10 MG tablet Take 1 tablet (10 mg total) by mouth once daily. 90 tablet 3    carvedilol (COREG) 25 MG tablet TAKE 1 TABLET(25 MG) BY MOUTH TWICE DAILY 60 tablet 6    cetirizine (ZYRTEC) 10 MG tablet Take 10 mg by mouth every evening.       furosemide (LASIX) 40 MG tablet TAKE 1 TABLET(40 MG) BY MOUTH TWICE DAILY 60 tablet 6    lisinopril 10 MG tablet Take 1 tablet (10 mg total) by mouth once daily. (Patient taking differently: Take 10 mg by mouth as needed. ) 90 tablet 3    metFORMIN (GLUCOPHAGE) 500 MG tablet TAKE ONE TABLET BY MOUTH TWICE DAILY 180 tablet 3    multivitamin (ONE DAILY  "MULTIVITAMIN) per tablet Take 1 tablet by mouth once daily.      sertraline (ZOLOFT) 100 MG tablet TAKE ONE TABLET BY MOUTH EVERY MORNING 90 tablet 1    TRUETEST TEST STRIPS Strp test once EVERY MORNING 100 strip 1    albuterol-ipratropium 2.5mg-0.5mg/3mL (DUO-NEB) 0.5 mg-3 mg(2.5 mg base)/3 mL nebulizer solution Take 3 mLs by nebulization every 6 (six) hours as needed for Wheezing. Rescue 1 Box 3    PULMICORT FLEXHALER 180 mcg/actuation AePB INHALE 2 PUFFS BY MOUTH TWICE DAILY 1 each 6     No current facility-administered medications on file prior to visit.        Allergies   Review of patient's allergies indicates:   Allergen Reactions    Adhesive Rash       Review of Systems (Pertinent positives)  Review of Systems   Constitutional: Positive for malaise/fatigue.   HENT: Negative.    Eyes: Negative.    Respiratory: Negative.    Cardiovascular: Negative.    Gastrointestinal: Negative.    Genitourinary: Negative.    Musculoskeletal: Negative.    Skin: Negative.    Psychiatric/Behavioral: Positive for depression. The patient has insomnia.          /86 (BP Location: Right arm, Patient Position: Sitting, BP Method: Medium (Manual))   Pulse 82   Temp 98.3 °F (36.8 °C) (Oral)   Resp 12   Ht 5' 2" (1.575 m)   Wt 81.7 kg (180 lb 1.9 oz)   LMP  (LMP Unknown)   SpO2 95%   BMI 32.94 kg/m²     GENERAL APPEARANCE: in no apparent distress and well developed and well nourished  HEENT: PERRL, EOMI, Sclera clear, anicteric, Oropharynx clear, no lesions, Neck supple with midline trachea  NECK: normal, supple, no adenopathy, thyroid normal in size  RESPIRATORY: appears well, vitals normal, no respiratory distress, acyanotic, normal RR, chest clear, no wheezing, crepitations, rhonchi, normal symmetric air entry  HEART: regular rate and rhythm, S1, S2 normal, no murmur, click, rub or gallop.    ABDOMEN: abdomen is soft without tenderness, no masses, no hernias, no organomegaly, no rebound, no guarding. Suprapubic " tenderness absent. No CVA tenderness.  SKIN: no rashes, no wounds, no other lesions  PSYCH: Alert, oriented x 3, thought content appropriate, speech normal, pleasant and cooperative, good eye contact, well groomed    Assessment/Plan:  Hannah Montoya is a 63 y.o. female who presents today for :    Hannah was seen today for insomnia.    Diagnoses and all orders for this visit:    Bereavement reaction  -     temazepam (RESTORIL) 15 mg Cap; Take 1 capsule (15 mg total) by mouth nightly as needed.    Colon cancer screening  -     Fecal Immunochemical Test (iFOBT); Future      1.  restoril for sleep  2.  fitkit ordered  3.  Follow up 4 weeks or sooner if needed  4.  Warned of risk of breathing suppression with use of benzos  5.  She will start with half a tablet and night and take a whole tablet if needed    Emanuel Chavez MD

## 2019-03-02 ENCOUNTER — PATIENT MESSAGE (OUTPATIENT)
Dept: ELECTROPHYSIOLOGY | Facility: CLINIC | Age: 64
End: 2019-03-02

## 2019-03-04 DIAGNOSIS — J41.0 SIMPLE CHRONIC BRONCHITIS: ICD-10-CM

## 2019-03-04 DIAGNOSIS — J44.1 COPD WITH ACUTE EXACERBATION: ICD-10-CM

## 2019-03-04 RX ORDER — ALBUTEROL SULFATE 90 UG/1
2 AEROSOL, METERED RESPIRATORY (INHALATION) EVERY 6 HOURS PRN
Qty: 17 G | Refills: 5 | Status: SHIPPED | OUTPATIENT
Start: 2019-03-04 | End: 2020-03-06 | Stop reason: SDUPTHER

## 2019-03-06 RX ORDER — CARVEDILOL 25 MG/1
TABLET ORAL
Qty: 180 TABLET | Refills: 3 | Status: SHIPPED | OUTPATIENT
Start: 2019-03-06 | End: 2020-03-25 | Stop reason: SDUPTHER

## 2019-03-06 RX ORDER — FUROSEMIDE 40 MG/1
TABLET ORAL
Qty: 180 TABLET | Refills: 3 | Status: SHIPPED | OUTPATIENT
Start: 2019-03-06 | End: 2020-03-25 | Stop reason: SDUPTHER

## 2019-03-09 RX ORDER — SERTRALINE HYDROCHLORIDE 100 MG/1
TABLET, FILM COATED ORAL
Qty: 90 TABLET | Refills: 1 | Status: SHIPPED | OUTPATIENT
Start: 2019-03-09 | End: 2019-10-14 | Stop reason: SDUPTHER

## 2019-03-17 ENCOUNTER — HOSPITAL ENCOUNTER (EMERGENCY)
Facility: HOSPITAL | Age: 64
Discharge: HOME | End: 2019-03-17
Attending: INTERNAL MEDICINE
Payer: COMMERCIAL

## 2019-03-17 VITALS
BODY MASS INDEX: 33.13 KG/M2 | WEIGHT: 180 LBS | HEIGHT: 62 IN | TEMPERATURE: 98 F | OXYGEN SATURATION: 97 % | RESPIRATION RATE: 20 BRPM | HEART RATE: 80 BPM | DIASTOLIC BLOOD PRESSURE: 99 MMHG | SYSTOLIC BLOOD PRESSURE: 174 MMHG

## 2019-03-17 DIAGNOSIS — K08.89 PAIN, DENTAL: Primary | ICD-10-CM

## 2019-03-17 PROCEDURE — 25000003 PHARM REV CODE 250: Mod: ER | Performed by: INTERNAL MEDICINE

## 2019-03-17 PROCEDURE — 99284 EMERGENCY DEPT VISIT MOD MDM: CPT | Mod: ER

## 2019-03-17 RX ORDER — IBUPROFEN 800 MG/1
800 TABLET ORAL EVERY 8 HOURS PRN
Qty: 30 TABLET | Refills: 0 | Status: SHIPPED | OUTPATIENT
Start: 2019-03-17 | End: 2020-05-26 | Stop reason: CLARIF

## 2019-03-17 RX ORDER — IBUPROFEN 400 MG/1
800 TABLET ORAL
Status: COMPLETED | OUTPATIENT
Start: 2019-03-17 | End: 2019-03-17

## 2019-03-17 RX ORDER — AMOXICILLIN 500 MG/1
500 TABLET, FILM COATED ORAL 2 TIMES DAILY
Qty: 20 TABLET | Refills: 0 | Status: SHIPPED | OUTPATIENT
Start: 2019-03-17 | End: 2020-01-09

## 2019-03-17 RX ORDER — AMOXICILLIN 250 MG/1
500 CAPSULE ORAL
Status: COMPLETED | OUTPATIENT
Start: 2019-03-17 | End: 2019-03-17

## 2019-03-17 RX ADMIN — IBUPROFEN 800 MG: 400 TABLET, FILM COATED ORAL at 10:03

## 2019-03-17 RX ADMIN — AMOXICILLIN 500 MG: 250 CAPSULE ORAL at 10:03

## 2019-03-18 NOTE — ED PROVIDER NOTES
Encounter Date: 3/17/2019    SCRIBE #1 NOTE: I, Brenda Jimenez, am scribing for, and in the presence of,  Dr. Smith . I have scribed the following portions of the note - Other sections scribed: HPI, ROS, PE.       History     Chief Complaint   Patient presents with    Dental Pain     pt c/o tooth pain x 2 day, pt c/o R lower side tooth pain     The history is provided by the patient. No  was used.   Dental Pain   The primary symptoms include mouth pain. Primary symptoms do not include dental injury, fever or sore throat. The symptoms began two days ago. The symptoms are unchanged. The symptoms are new. The symptoms occur constantly.   Mouth pain occurs constantly. Mouth pain is unchanged. Affected locations include: teeth (upper wisdom tooth).   Additional symptoms do not include: trismus, jaw pain, facial swelling, dry mouth, drooling and ear pain.     Review of patient's allergies indicates:   Allergen Reactions    Adhesive Rash     Past Medical History:   Diagnosis Date    Asthma     bronchitis in past    Breast cancer 2016    right    Cardiac pacemaker     Cardiomyopathy     COPD (chronic obstructive pulmonary disease)     Diabetes mellitus, type 2     Hyperglycemia     Hyperlipidemia     Hypertension      Past Surgical History:   Procedure Laterality Date    BIOPSY-SENTINEL NODE Right 2016    [facility] 2ND [facility]LR    [facility]SY Right 2016    [facility]    BREAST LUMPECTOMY Right      SECTION      x2    CHOLECYSTECTOMY      HEART CATH-BILATERAL Bilateral 2017    [facility] CATH [facility]    INJECTION-SENTINEL NODE Right 2016    [facility] at [facility] OR 2ND [facility]LR    INSERTION-ICD-BIVENTRICULAR N/A 2018    Performed by [facility] at [facility] CATH LAB    INSERTION-ICD-BIVENTRICULAR N/A 8/10/2017    Per[facility] at [facility] CATH LAB     LUMPECTOMY-BREAST-w/wire localization  Right 2016    [facility] 2ND [facility]    REVISION-LEAD-ICD N/A 6/15/2018    Per[facility] at [facility] CATH LAB    TUBAL LIGATION       Family History   Problem Relation Age of Onset    Kidney disease Mother     Kidney disease Father     Clotting disorder Brother     Breast cancer Neg Hx     Ovarian cancer Neg Hx      Social History     Tobacco Use    Smoking status: Former Smoker     Packs/day: 0.50     Years: 40.00     Pack years: 20.00     Last attempt to quit: 2016     Years since quittin.6    Smokeless tobacco: Never Used   Substance Use Topics    Alcohol use: Yes     Alcohol/week: 0.0 oz     Comment: rare- holiday    Drug use: No     Review of Systems   Constitutional: Negative for fever.   HENT: Positive for dental problem. Negative for drooling, ear pain, facial swelling and sore throat.    All other systems reviewed and are negative.      Physical Exam     Initial Vitals [19 2153]   BP Pulse Resp Temp SpO2   (!) 194/100 83 18 97.9 °F (36.6 °C) (!) 92 %      MAP       --         Physical Exam    Nursing note and vitals reviewed.  Constitutional: She appears well-developed and well-nourished. She is not diaphoretic. No distress.   HENT:   Head: Normocephalic and atraumatic.   Mouth/Throat: Uvula is midline, oropharynx is clear and moist and mucous membranes are normal.       Eyes: EOM are normal.   Neck: Normal range of motion. Neck supple.   Pulmonary/Chest: No respiratory distress.   Musculoskeletal: Normal range of motion.   Neurological: She is alert and oriented to person, place, and time. No cranial nerve deficit or sensory deficit.   Skin: Skin is warm and dry. Capillary refill takes less than 2 seconds.   Psychiatric: She has a normal mood and affect. Her behavior is normal.         ED Course   Procedures  Labs Reviewed - No data to display       Imaging Results    None          Medical  Decision Making:   Initial Assessment:   The history is provided by the patient. No  was used.   Dental Pain   The primary symptoms include mouth pain. Primary symptoms do not include dental injury, fever or sore throat. The symptoms began two days ago. The symptoms are unchanged. The symptoms are new. The symptoms occur constantly.   Mouth pain occurs constantly. Mouth pain is unchanged. Affected locations include: teeth (upper wisdom tooth).   Additional symptoms do not include: trismus, jaw pain, facial swelling, dry mouth, drooling and ear pain.               Scribe Attestation:   Scribe #1: I performed the above scribed service and the documentation accurately describes the services I performed. I attest to the accuracy of the note.    This document was produced by a scribe under my direction and in my presence. I agree with the content of the note and have made any necessary edits.     Dr. Smith    03/18/2019 5:49 AM             Clinical Impression:     1. Pain, dental          Disposition:   Disposition: Discharged  Condition: Stable                        Julio Smith MD  03/18/19 0549

## 2019-03-29 PROCEDURE — 82274 ASSAY TEST FOR BLOOD FECAL: CPT

## 2019-04-02 ENCOUNTER — LAB VISIT (OUTPATIENT)
Dept: LAB | Facility: HOSPITAL | Age: 64
End: 2019-04-02
Attending: FAMILY MEDICINE
Payer: COMMERCIAL

## 2019-04-02 DIAGNOSIS — Z12.11 COLON CANCER SCREENING: ICD-10-CM

## 2019-04-02 LAB — HEMOCCULT STL QL IA: NEGATIVE

## 2019-04-08 ENCOUNTER — CLINICAL SUPPORT (OUTPATIENT)
Dept: CARDIOLOGY | Facility: HOSPITAL | Age: 64
End: 2019-04-08
Attending: INTERNAL MEDICINE
Payer: COMMERCIAL

## 2019-04-08 DIAGNOSIS — Z95.810 CARDIAC DEFIBRILLATOR IN PLACE: ICD-10-CM

## 2019-04-08 PROCEDURE — 93296 REM INTERROG EVL PM/IDS: CPT

## 2019-05-02 ENCOUNTER — PATIENT MESSAGE (OUTPATIENT)
Dept: FAMILY MEDICINE | Facility: CLINIC | Age: 64
End: 2019-05-02

## 2019-05-09 ENCOUNTER — HOSPITAL ENCOUNTER (OUTPATIENT)
Dept: RADIOLOGY | Facility: HOSPITAL | Age: 64
Discharge: HOME | End: 2019-05-09
Attending: FAMILY MEDICINE
Payer: COMMERCIAL

## 2019-05-09 DIAGNOSIS — Z00.00 WELL WOMAN EXAM (NO GYNECOLOGICAL EXAM): ICD-10-CM

## 2019-05-09 PROCEDURE — 77067 MAMMO DIGITAL SCREENING BILAT WITH CAD: ICD-10-PCS | Mod: 26,,, | Performed by: RADIOLOGY

## 2019-05-09 PROCEDURE — 77067 SCR MAMMO BI INCL CAD: CPT | Mod: 26,,, | Performed by: RADIOLOGY

## 2019-05-09 PROCEDURE — 77067 SCR MAMMO BI INCL CAD: CPT | Mod: TC

## 2019-05-10 ENCOUNTER — LAB VISIT (OUTPATIENT)
Dept: LAB | Facility: HOSPITAL | Age: 64
End: 2019-05-10
Attending: FAMILY MEDICINE
Payer: COMMERCIAL

## 2019-05-10 DIAGNOSIS — Z00.00 WELL WOMAN EXAM (NO GYNECOLOGICAL EXAM): ICD-10-CM

## 2019-05-10 DIAGNOSIS — E11.9 TYPE 2 DIABETES MELLITUS WITHOUT COMPLICATION: ICD-10-CM

## 2019-05-10 LAB
ALBUMIN SERPL BCP-MCNC: 4 G/DL (ref 3.5–5.2)
ALP SERPL-CCNC: 45 U/L (ref 55–135)
ALT SERPL W/O P-5'-P-CCNC: 30 U/L (ref 10–44)
ANION GAP SERPL CALC-SCNC: 9 MMOL/L (ref 8–16)
AST SERPL-CCNC: 24 U/L (ref 10–40)
BASOPHILS # BLD AUTO: 0.04 K/UL (ref 0–0.2)
BASOPHILS NFR BLD: 0.8 % (ref 0–1.9)
BILIRUB SERPL-MCNC: 0.4 MG/DL (ref 0.1–1)
BUN SERPL-MCNC: 16 MG/DL (ref 8–23)
CALCIUM SERPL-MCNC: 9.8 MG/DL (ref 8.7–10.5)
CHLORIDE SERPL-SCNC: 103 MMOL/L (ref 95–110)
CHOLEST SERPL-MCNC: 141 MG/DL (ref 120–199)
CHOLEST/HDLC SERPL: 3.2 {RATIO} (ref 2–5)
CO2 SERPL-SCNC: 31 MMOL/L (ref 23–29)
CREAT SERPL-MCNC: 0.9 MG/DL (ref 0.5–1.4)
DIFFERENTIAL METHOD: ABNORMAL
EOSINOPHIL # BLD AUTO: 0.1 K/UL (ref 0–0.5)
EOSINOPHIL NFR BLD: 2.5 % (ref 0–8)
ERYTHROCYTE [DISTWIDTH] IN BLOOD BY AUTOMATED COUNT: 13.7 % (ref 11.5–14.5)
EST. GFR  (AFRICAN AMERICAN): >60 ML/MIN/1.73 M^2
EST. GFR  (NON AFRICAN AMERICAN): >60 ML/MIN/1.73 M^2
ESTIMATED AVG GLUCOSE: 131 MG/DL (ref 68–131)
GLUCOSE SERPL-MCNC: 121 MG/DL (ref 70–110)
HBA1C MFR BLD HPLC: 6.2 % (ref 4–5.6)
HCT VFR BLD AUTO: 40.7 % (ref 37–48.5)
HDLC SERPL-MCNC: 44 MG/DL (ref 40–75)
HDLC SERPL: 31.2 % (ref 20–50)
HGB BLD-MCNC: 13 G/DL (ref 12–16)
LDLC SERPL CALC-MCNC: 57.4 MG/DL (ref 63–159)
LYMPHOCYTES # BLD AUTO: 1.8 K/UL (ref 1–4.8)
LYMPHOCYTES NFR BLD: 33.2 % (ref 18–48)
MCH RBC QN AUTO: 29.8 PG (ref 27–31)
MCHC RBC AUTO-ENTMCNC: 31.9 G/DL (ref 32–36)
MCV RBC AUTO: 93 FL (ref 82–98)
MONOCYTES # BLD AUTO: 0.5 K/UL (ref 0.3–1)
MONOCYTES NFR BLD: 9.8 % (ref 4–15)
NEUTROPHILS # BLD AUTO: 2.9 K/UL (ref 1.8–7.7)
NEUTROPHILS NFR BLD: 53.7 % (ref 38–73)
NONHDLC SERPL-MCNC: 97 MG/DL
PLATELET # BLD AUTO: 189 K/UL (ref 150–350)
PMV BLD AUTO: 12.9 FL (ref 9.2–12.9)
POTASSIUM SERPL-SCNC: 4 MMOL/L (ref 3.5–5.1)
PROT SERPL-MCNC: 7.1 G/DL (ref 6–8.4)
RBC # BLD AUTO: 4.36 M/UL (ref 4–5.4)
SODIUM SERPL-SCNC: 143 MMOL/L (ref 136–145)
TRIGL SERPL-MCNC: 198 MG/DL (ref 30–150)
WBC # BLD AUTO: 5.3 K/UL (ref 3.9–12.7)

## 2019-05-10 PROCEDURE — 80061 LIPID PANEL: CPT

## 2019-05-10 PROCEDURE — 36415 COLL VENOUS BLD VENIPUNCTURE: CPT | Mod: PN

## 2019-05-10 PROCEDURE — 83036 HEMOGLOBIN GLYCOSYLATED A1C: CPT

## 2019-05-10 PROCEDURE — 85025 COMPLETE CBC W/AUTO DIFF WBC: CPT

## 2019-05-10 PROCEDURE — 80053 COMPREHEN METABOLIC PANEL: CPT

## 2019-05-13 DIAGNOSIS — Z23 IMMUNIZATION DUE: Primary | ICD-10-CM

## 2019-05-23 ENCOUNTER — PATIENT MESSAGE (OUTPATIENT)
Dept: FAMILY MEDICINE | Facility: CLINIC | Age: 64
End: 2019-05-23

## 2019-05-23 DIAGNOSIS — J44.9 CHRONIC OBSTRUCTIVE PULMONARY DISEASE, UNSPECIFIED COPD TYPE: ICD-10-CM

## 2019-05-24 ENCOUNTER — LAB VISIT (OUTPATIENT)
Dept: LAB | Facility: HOSPITAL | Age: 64
End: 2019-05-24
Attending: FAMILY MEDICINE
Payer: COMMERCIAL

## 2019-05-24 ENCOUNTER — PATIENT MESSAGE (OUTPATIENT)
Dept: FAMILY MEDICINE | Facility: CLINIC | Age: 64
End: 2019-05-24

## 2019-05-24 DIAGNOSIS — E11.9 TYPE 2 DIABETES MELLITUS WITHOUT COMPLICATION: ICD-10-CM

## 2019-05-24 PROCEDURE — 82043 UR ALBUMIN QUANTITATIVE: CPT

## 2019-05-24 RX ORDER — IPRATROPIUM BROMIDE AND ALBUTEROL SULFATE 2.5; .5 MG/3ML; MG/3ML
3 SOLUTION RESPIRATORY (INHALATION) EVERY 6 HOURS PRN
Qty: 1 BOX | Refills: 3 | Status: SHIPPED | OUTPATIENT
Start: 2019-05-24 | End: 2019-06-20 | Stop reason: SDUPTHER

## 2019-05-25 LAB
ALBUMIN/CREAT UR: 61.4 UG/MG (ref 0–30)
CREAT UR-MCNC: 176 MG/DL (ref 15–325)
MICROALBUMIN UR DL<=1MG/L-MCNC: 108 UG/ML

## 2019-06-07 ENCOUNTER — LAB VISIT (OUTPATIENT)
Dept: LAB | Facility: HOSPITAL | Age: 64
End: 2019-06-07
Attending: FAMILY MEDICINE
Payer: COMMERCIAL

## 2019-06-07 DIAGNOSIS — Z23 IMMUNIZATION DUE: ICD-10-CM

## 2019-06-07 PROCEDURE — 36415 COLL VENOUS BLD VENIPUNCTURE: CPT | Mod: PN

## 2019-06-07 PROCEDURE — 86762 RUBELLA ANTIBODY: CPT

## 2019-06-07 PROCEDURE — 86735 MUMPS ANTIBODY: CPT

## 2019-06-07 PROCEDURE — 86765 RUBEOLA ANTIBODY: CPT

## 2019-06-10 LAB
RUBV IGG SER-ACNC: 356 IU/ML
RUBV IGG SER-IMP: REACTIVE

## 2019-06-13 LAB
MUMPS IGG INTERPRETATION: NEGATIVE
MUMPS IGG SCREEN: 0.22 ISR (ref 0–0.9)
RUBEOLA IGG ANTIBODY: 2.21 ISR (ref 0–0.9)
RUBEOLA INTERPRETATION: POSITIVE

## 2019-06-20 ENCOUNTER — OFFICE VISIT (OUTPATIENT)
Dept: FAMILY MEDICINE | Facility: CLINIC | Age: 64
End: 2019-06-20
Payer: COMMERCIAL

## 2019-06-20 VITALS
DIASTOLIC BLOOD PRESSURE: 82 MMHG | BODY MASS INDEX: 34.2 KG/M2 | TEMPERATURE: 98 F | HEART RATE: 87 BPM | HEIGHT: 62 IN | RESPIRATION RATE: 16 BRPM | OXYGEN SATURATION: 95 % | SYSTOLIC BLOOD PRESSURE: 126 MMHG | WEIGHT: 185.88 LBS

## 2019-06-20 DIAGNOSIS — J44.1 COPD EXACERBATION: Primary | ICD-10-CM

## 2019-06-20 DIAGNOSIS — J01.90 ACUTE VIRAL SINUSITIS: ICD-10-CM

## 2019-06-20 DIAGNOSIS — J44.9 CHRONIC OBSTRUCTIVE PULMONARY DISEASE, UNSPECIFIED COPD TYPE: ICD-10-CM

## 2019-06-20 DIAGNOSIS — B97.89 ACUTE VIRAL SINUSITIS: ICD-10-CM

## 2019-06-20 DIAGNOSIS — H61.21 IMPACTED CERUMEN, RIGHT EAR: ICD-10-CM

## 2019-06-20 PROCEDURE — 99214 OFFICE O/P EST MOD 30 MIN: CPT | Mod: S$GLB,,, | Performed by: NURSE PRACTITIONER

## 2019-06-20 PROCEDURE — 99999 PR PBB SHADOW E&M-EST. PATIENT-LVL III: ICD-10-PCS | Mod: PBBFAC,,, | Performed by: NURSE PRACTITIONER

## 2019-06-20 PROCEDURE — 99214 PR OFFICE/OUTPT VISIT, EST, LEVL IV, 30-39 MIN: ICD-10-PCS | Mod: S$GLB,,, | Performed by: NURSE PRACTITIONER

## 2019-06-20 PROCEDURE — 99999 PR PBB SHADOW E&M-EST. PATIENT-LVL III: CPT | Mod: PBBFAC,,, | Performed by: NURSE PRACTITIONER

## 2019-06-20 RX ORDER — FLUTICASONE PROPIONATE 50 MCG
1 SPRAY, SUSPENSION (ML) NASAL DAILY
Qty: 15.8 ML | Refills: 0 | Status: SHIPPED | OUTPATIENT
Start: 2019-06-20 | End: 2020-05-26 | Stop reason: CLARIF

## 2019-06-20 RX ORDER — DOXYCYCLINE HYCLATE 100 MG
100 TABLET ORAL EVERY 12 HOURS
Qty: 14 TABLET | Refills: 0 | Status: SHIPPED | OUTPATIENT
Start: 2019-06-20 | End: 2020-03-12 | Stop reason: HOSPADM

## 2019-06-20 RX ORDER — IPRATROPIUM BROMIDE AND ALBUTEROL SULFATE 2.5; .5 MG/3ML; MG/3ML
3 SOLUTION RESPIRATORY (INHALATION) EVERY 6 HOURS PRN
Qty: 1 BOX | Refills: 3 | Status: SHIPPED | OUTPATIENT
Start: 2019-06-20 | End: 2019-12-30

## 2019-06-20 RX ORDER — PREDNISONE 20 MG/1
60 TABLET ORAL DAILY
Qty: 15 TABLET | Refills: 0 | Status: SHIPPED | OUTPATIENT
Start: 2019-06-20 | End: 2019-06-25

## 2019-06-20 NOTE — PROGRESS NOTES
Routine Office Visit    Patient Name: Hannah Montoya    : 1955  MRN: [id number]    Chief Complaint:  Wheezing, shortness of breath, sinus congestion    Subjective:  Hannah is a 63 y.o. female who presents today for:    1. Wheezing, shortness of breath, sinus congestion - patient reports today for number of complaints.  Endorses nasal congestion, runny nose, sinus headache, productive cough, as well as sore throat all of which started 2 days ago.  She denies any fevers or chills.  Denies any sick contacts.  Denies any nausea vomiting or diarrhea.  For the symptoms she has been trying Tylenol and Mucinex both of which have only helped slightly.  She has been diagnosed with COPD before.  She is a former smoker who quit in 2016.  She also endorses daily shortness of breath which has worsened in the past 2-3 days.  She states that on a regular basis she has to use 2-3 breathing treatments per day.  She has Pulmicort at home but she has not been using it every day.  Of note, she states that she also takes Claritin every day.  No chest pain or palpitations.    Past Medical History  Past Medical History:   Diagnosis Date    Asthma     bronchitis in past    Breast cancer 2016    right    Cardiac pacemaker     Cardiomyopathy     COPD (chronic obstructive pulmonary disease)     Diabetes mellitus, type 2     Hyperglycemia     Hyperlipidemia     Hypertension        Past Surgical History  Past Surgical History:   Procedure Laterality Date    BIOPSY-SENTINEL NODE Right 2016    [facility] 2ND FLR    [facility]SY Right 2016    [facility]    BREAST LUMPECTOMY Right      SECTION      x2    CHOLECYSTECTOMY      HEART CATH-BILATERAL Bilateral 2017    [facility] CATH LAB    INJECTION-SENTINEL NODE Right 2016    [facility] OR 2ND [facility]LR    INSERTION-ICD-BIVENTRICULAR N/A 2018    Performed by  [facility] at [facility] CATH LAB    INSERTION-ICD-BIVENTRICULAR N/A 8/10/2017    [facility]ATH [facility]    LUMPECTOMY-BREAST-w/wire localization  Right 2016    Performed [facility] at [facility] OR 2ND [facility]    REVISION-LEAD-ICD N/A 6/15/2018    Performed by [facility] at [facility] CATH LAB    TUBAL LIGATION         Family History  Family History   Problem Relation Age of Onset    Kidney disease Mother     Kidney disease Father     Clotting disorder Brother     Breast cancer Neg Hx     Ovarian cancer Neg Hx        Social History  Social History     Socioeconomic History    Marital status:      Spouse name: Not on file    Number of children: Not on file    Years of education: Not on file    Highest education level: Not on file   Occupational History     Employer: [employer]   Social Needs    Financial resource strain: Not on file    Food insecurity:     Worry: Not on file     Inability: Not on file    Transportation needs:     Medical: Not on file     Non-medical: Not on file   Tobacco Use    Smoking status: Former Smoker     Packs/day: 0.50     Years: 40.00     Pack years: 20.00     Last attempt to quit: 2016     Years since quittin.8    Smokeless tobacco: Never Used   Substance and Sexual Activity    Alcohol use: Yes     Alcohol/week: 0.0 oz     Comment: rare- holiday    Drug use: No    Sexual activity: Not on file   Lifestyle    Physical activity:     Days per week: Not on file     Minutes per session: Not on file    Stress: Not on file   Relationships    Social connections:     Talks on phone: Not on file     Gets together: Not on file     Attends [employer] service: Not on file     Active member of club or organization: Not on file     Attends meetings of clubs or organizations: Not on file     Relationship status: Not on file   Other Topics Concern    Not on file   Social History Narrative    Not on file        Current Medications  Current Outpatient Medications on File Prior to Visit   Medication Sig Dispense Refill    albuterol (PROAIR HFA) 90 mcg/actuation inhaler Inhale 2 puffs into the lungs every 6 (six) hours as needed. Rescue 17 g 5    atorvastatin (LIPITOR) 10 MG tablet Take 1 tablet (10 mg total) by mouth once daily. 90 tablet 3    carvedilol (COREG) 25 MG tablet TAKE 1 TABLET(25 MG) BY MOUTH TWICE DAILY 180 tablet 3    cetirizine (ZYRTEC) 10 MG tablet Take 10 mg by mouth every evening.       furosemide (LASIX) 40 MG tablet TAKE 1 TABLET(40 MG) BY MOUTH TWICE DAILY 180 tablet 3    lisinopril 10 MG tablet Take 1 tablet (10 mg total) by mouth once daily. (Patient taking differently: Take 10 mg by mouth as needed. ) 90 tablet 3    metFORMIN (GLUCOPHAGE) 500 MG tablet TAKE ONE TABLET BY MOUTH TWICE DAILY 180 tablet 3    multivitamin (ONE DAILY MULTIVITAMIN) per tablet Take 1 tablet by mouth once daily.      PULMICORT FLEXHALER 180 mcg/actuation AePB INHALE 2 PUFFS BY MOUTH TWICE DAILY 1 each 6    sertraline (ZOLOFT) 100 MG tablet TAKE ONE TABLET BY MOUTH EVERY MORNING 90 tablet 1    TRUETEST TEST STRIPS Strp test once EVERY MORNING 100 strip 1    [DISCONTINUED] albuterol-ipratropium (DUO-NEB) 2.5 mg-0.5 mg/3 mL nebulizer solution Take 3 mLs by nebulization every 6 (six) hours as needed for Wheezing. Rescue 1 Box 3    amoxicillin (AMOXIL) 500 MG Tab Take 1 tablet (500 mg total) by mouth 2 (two) times daily. 20 tablet 0    ibuprofen (ADVIL,MOTRIN) 800 MG tablet Take 1 tablet (800 mg total) by mouth every 8 (eight) hours as needed for Pain. 30 tablet 0     No current facility-administered medications on file prior to visit.        Allergies   Review of patient's allergies indicates:   Allergen Reactions    Adhesive Rash       Review of Systems (Pertinent positives)  Review of Systems   Constitutional: Negative for chills and fever.   HENT: Positive for congestion, sinus pain and sore throat.  "Negative for ear discharge, ear pain, hearing loss, nosebleeds and tinnitus.    Eyes: Negative for blurred vision, double vision and photophobia.   Respiratory: Positive for cough, sputum production, shortness of breath and wheezing. Negative for hemoptysis and stridor.    Cardiovascular: Negative.  Negative for chest pain, palpitations, orthopnea and claudication.   Gastrointestinal: Negative for abdominal pain, diarrhea, heartburn, nausea and vomiting.   Genitourinary: Negative.    Musculoskeletal: Negative.    Neurological: Positive for headaches.   Endo/Heme/Allergies: Negative.    Psychiatric/Behavioral: Negative.        /82 (BP Location: Right arm, Patient Position: Sitting, BP Method: Small (Manual))   Pulse 87   Temp 98.4 °F (36.9 °C) (Oral)   Resp 16   Ht 5' 2" (1.575 m)   Wt 84.3 kg (185 lb 13.6 oz)   LMP  (LMP Unknown)   SpO2 95%   BMI 33.99 kg/m²     Physical Exam   Constitutional: She appears well-developed and well-nourished. No distress.   HENT:   Head: Normocephalic and atraumatic.   Right Ear: External ear and ear canal normal.   Left Ear: Tympanic membrane, external ear and ear canal normal.   Ears:    Nose: Mucosal edema and rhinorrhea present. Right sinus exhibits frontal sinus tenderness. Right sinus exhibits no maxillary sinus tenderness. Left sinus exhibits frontal sinus tenderness. Left sinus exhibits no maxillary sinus tenderness.   Mouth/Throat: Uvula is midline, oropharynx is clear and moist and mucous membranes are normal. Tonsils are 0 on the right. Tonsils are 0 on the left. No tonsillar exudate.       Eyes: Pupils are equal, round, and reactive to light. Conjunctivae and EOM are normal.   Neck: Normal range of motion. Neck supple.   Cardiovascular: Normal rate, regular rhythm, normal heart sounds and intact distal pulses. Exam reveals no gallop and no friction rub.   No murmur heard.  Pulmonary/Chest: Effort normal. No accessory muscle usage or stridor. No tachypnea. No " respiratory distress. She has wheezes in the right upper field, the right middle field, the right lower field, the left upper field, the left middle field and the left lower field. She has no rhonchi. She has no rales. She exhibits no tenderness.   Scattered wheezes throughout all lung fields no rhonchi and no rales no signs of consolidation   Abdominal: Soft. Bowel sounds are normal. She exhibits no distension and no mass. There is no tenderness. There is no rebound and no guarding. No hernia.   Skin: She is not diaphoretic.        Assessment/Plan:  Hannah Montoya is a 63 y.o. female who presents today for :    Hannah was seen today for sinus problem, cough and nasal congestion.    Diagnoses and all orders for this visit:    COPD exacerbation  -     predniSONE (DELTASONE) 20 MG tablet; Take 3 tablets (60 mg total) by mouth once daily. for 5 days  -     doxycycline (VIBRA-TABS) 100 MG tablet; Take 1 tablet (100 mg total) by mouth every 12 (twelve) hours.  -     albuterol-ipratropium (DUO-NEB) 2.5 mg-0.5 mg/3 mL nebulizer solution; Take 3 mLs by nebulization every 6 (six) hours as needed for Wheezing. Rescue    Acute viral sinusitis  -     predniSONE (DELTASONE) 20 MG tablet; Take 3 tablets (60 mg total) by mouth once daily. for 5 days  -     fluticasone propionate (FLONASE) 50 mcg/actuation nasal spray; 1 spray (50 mcg total) by Each Nare route once daily.    Impacted cerumen, right ear  -     Ambulatory referral to ENT    Chronic obstructive pulmonary disease, unspecified COPD type  -     albuterol-ipratropium (DUO-NEB) 2.5 mg-0.5 mg/3 mL nebulizer solution; Take 3 mLs by nebulization every 6 (six) hours as needed for Wheezing. Rescue     Likely viral sinusitis causing a COPD exacerbation.  Will treat with prednisone and doxycycline instead of the Z pack given that the patient has a pacemaker.  Will refill duo nebs per patient request.  Patient states that she uses her rescue breathing treatment 2-3 times per  day on a regular basis.  I encouraged her that she needs better long-term control of her COPD.  She has a Pulmicort prescription but she has not taken it in a while.  She states that she has Pulmicort at home.  I encouraged her to restart taking that medication.  She was instructed to rinse her mouth out after using the medication.  Will start daily Flonase for viral sinusitis.  Continue daily Claritin.  Drink plenty of water.  Alternate Tylenol and ibuprofen for any aches, pains, or fevers.  Can use warm salt water gargles or tea with honey as needed.  I attempted to flush the right ear with a mixture of warm water and peroxide however I was unable to remove the cerumen impaction.  The patient was referred to ENT for this.  She was instructed that she can use over-the-counter Debrox solution to remove the wax.  Patient was instructed to avoid use of Q-Tips or anything else in the ear.  She is to follow-up to the clinic for any other concerns.  She is to go to the emergency room for any fevers or chills uncontrolled by medication or for any shortness of breath or wheezing uncontrolled by rescue medication.        This office note has been dictated.  This dictation has been generated using M-Modal Fluency Direct dictation; some phonetic errors may occur.   My collaborating physician is Dr. Zhou Coy.

## 2019-07-08 ENCOUNTER — CLINICAL SUPPORT (OUTPATIENT)
Dept: CARDIOLOGY | Facility: HOSPITAL | Age: 64
End: 2019-07-08
Attending: INTERNAL MEDICINE
Payer: COMMERCIAL

## 2019-07-08 DIAGNOSIS — Z95.810 CARDIAC DEFIBRILLATOR IN PLACE: ICD-10-CM

## 2019-07-08 PROCEDURE — 93296 REM INTERROG EVL PM/IDS: CPT

## 2019-07-18 ENCOUNTER — TELEPHONE (OUTPATIENT)
Dept: FAMILY MEDICINE | Facility: CLINIC | Age: 64
End: 2019-07-18

## 2019-07-18 NOTE — LETTER
July 18, 2019    Hannah Montoya  [address] [place] [address]             [facility]  [address] [place] [address]  Phone: [phone]  Fax: [phone] Dear Mrs. Montoya:    Sorry we were unable to contact you to schedule your ENT appointment. Please give the referral department a call at [phone].        If you have any questions or concerns, please don't hesitate to call.    Sincerely,        Ricardo Gudino MA

## 2019-09-30 ENCOUNTER — PATIENT OUTREACH (OUTPATIENT)
Dept: ADMINISTRATIVE | Facility: OTHER | Age: 64
End: 2019-09-30

## 2019-09-30 DIAGNOSIS — E11.9 TYPE 2 DIABETES MELLITUS WITHOUT COMPLICATION, UNSPECIFIED WHETHER LONG TERM INSULIN USE: Primary | ICD-10-CM

## 2019-09-30 PROBLEM — Z95.810 CARDIAC RESYNCHRONIZATION THERAPY DEFIBRILLATOR (CRT-D) IN PLACE: Status: ACTIVE | Noted: 2019-09-30

## 2019-09-30 NOTE — PROGRESS NOTES
Ms. Montoya is a patient of Dr. Gates and was last seen in clinic 9/25/2018.      Subjective:   Patient ID:  Hannah Montoya is a 64 y.o. female who presents for follow-up of Cardiomyopathy  .     HPI:    Ms. Montoya is a 64 y.o. female with NICM, LBBB, CRT-D, HTN, HLD, DM, hx of rt breast CA here for annual follow up.    Background:    63 yoF NICM, LBBB s/p CRT-D here for second opinion regarding transvenous LV lead placement. She has an attempt at an LV lead 8/10/17 by Dr Lopez. The attempt was complicated by tortuous venous anatomy and inability to pass an LV lead. Venograms show mid and anterolateral branches with tortuous courses. The mid lateral branch was accessed with a 014 wire but the lead could not be advanced. She had an epicardial lead implanted however she has elevated thresholds, 5.0 V @ 1.2 ms. She has expected 1-1.5 y on her battery life giving her overall ~2 years with her current device. She has mild improvement in symptoms. QRS went from 168 to 150 ms with CRT pacing. She is here for a second opinion.     9/2018: Attempt at His bundle lead 5/2/18 resulting in dislodgement. Re-attempt 6/15/18 with successful lead placement. Marked improvement in symptoms. ECG with QRS 84 ms. Normal CRT-D function (His lead in LV port). Echo showed EF of 50-55%.    Update (10/01/2019):    Today she says she feels well. No new cardiac complaints. Ms. Montoya denies chest pain with exertion or at rest, palpitations, SOB, SINGH, dizziness, or syncope.    Device Interrogation (10/1/2019) reveals an intrinsic SR with stable lead and device function. No arrhythmias or treated episodes were noted.  She paces 0% in the RA and 99% in the BiV (LV in His placement). Estimated battery longevity 6.6 years. LV output increased from 2V to 3V based on capture threshold.     I have personally reviewed the patient's EKG today, which shows ASVP at 82bpm. WI interval is 192. QRS is 158. QTc is 544. Intermittent BiV pacing  noted (with QRS of 84ms). EKG prior to device changes above.    Recent Cardiac Tests:    2D Echo (10/16/2018):  CONCLUSIONS     1 - Low normal to mildly depressed left ventricular systolic function (EF 50-55%).     2 - Wall motion abnormalities.     3 - Normal left ventricular diastolic function.     4 - Normal right ventricular systolic function .     5 - Trivial aortic regurgitation.     6 - Trivial to mild mitral regurgitation.     7 - Trivial tricuspid regurgitation.     8 - Trivial pulmonic regurgitation.     9 - The estimated PA systolic pressure is 31 mmHg.     Current Outpatient Medications   Medication Sig    albuterol (PROAIR HFA) 90 mcg/actuation inhaler Inhale 2 puffs into the lungs every 6 (six) hours as needed. Rescue    albuterol-ipratropium (DUO-NEB) 2.5 mg-0.5 mg/3 mL nebulizer solution Take 3 mLs by nebulization every 6 (six) hours as needed for Wheezing. Rescue    amoxicillin (AMOXIL) 500 MG Tab Take 1 tablet (500 mg total) by mouth 2 (two) times daily.    atorvastatin (LIPITOR) 10 MG tablet Take 1 tablet (10 mg total) by mouth once daily.    carvedilol (COREG) 25 MG tablet TAKE 1 TABLET(25 MG) BY MOUTH TWICE DAILY    cetirizine (ZYRTEC) 10 MG tablet Take 10 mg by mouth every evening.     fluticasone propionate (FLONASE) 50 mcg/actuation nasal spray 1 spray (50 mcg total) by Each Nare route once daily.    furosemide (LASIX) 40 MG tablet TAKE 1 TABLET(40 MG) BY MOUTH TWICE DAILY    lisinopril 10 MG tablet Take 1 tablet (10 mg total) by mouth once daily. (Patient taking differently: Take 10 mg by mouth as needed. )    metFORMIN (GLUCOPHAGE) 500 MG tablet TAKE ONE TABLET BY MOUTH TWICE DAILY    multivitamin (ONE DAILY MULTIVITAMIN) per tablet Take 1 tablet by mouth once daily.    PULMICORT FLEXHALER 180 mcg/actuation AePB INHALE 2 PUFFS BY MOUTH TWICE DAILY    sertraline (ZOLOFT) 100 MG tablet TAKE ONE TABLET BY MOUTH EVERY MORNING    TRUETEST TEST STRIPS Strp test once EVERY MORNING  "   doxycycline (VIBRA-TABS) 100 MG tablet Take 1 tablet (100 mg total) by mouth every 12 (twelve) hours. (Patient not taking: Reported on 10/1/2019)    ibuprofen (ADVIL,MOTRIN) 800 MG tablet Take 1 tablet (800 mg total) by mouth every 8 (eight) hours as needed for Pain. (Patient not taking: Reported on 10/1/2019)     No current facility-administered medications for this visit.      Review of Systems   Constitution: Negative for malaise/fatigue.   Cardiovascular: Negative for chest pain, dyspnea on exertion, irregular heartbeat, leg swelling and palpitations.   Respiratory: Negative for shortness of breath.    Hematologic/Lymphatic: Negative for bleeding problem.   Skin: Negative for rash.   Musculoskeletal: Negative for myalgias.   Gastrointestinal: Negative for hematemesis, hematochezia and nausea.   Genitourinary: Negative for hematuria.   Neurological: Negative for light-headedness.   Psychiatric/Behavioral: Negative for altered mental status.   Allergic/Immunologic: Negative for persistent infections.     Objective:          /65   Pulse 82   Ht 5' 2" (1.575 m)   Wt 85.7 kg (188 lb 15 oz)   LMP  (LMP Unknown)   BMI 34.56 kg/m²     Physical Exam   Constitutional: She is oriented to person, place, and time. She appears well-developed and well-nourished.   HENT:   Head: Normocephalic.   Nose: Nose normal.   Eyes: Pupils are equal, round, and reactive to light.   Cardiovascular: Normal rate, regular rhythm, S1 normal and S2 normal.   No murmur heard.  Pulses:       Radial pulses are 2+ on the right side, and 2+ on the left side.   Pulmonary/Chest: Breath sounds normal. No respiratory distress.   Device to LUCW - incision well healed   Abdominal: Normal appearance.   Musculoskeletal: Normal range of motion. She exhibits no edema.   Neurological: She is alert and oriented to person, place, and time.   Skin: Skin is warm and dry. No erythema.   Psychiatric: She has a normal mood and affect. Her speech is " normal and behavior is normal.   Nursing note and vitals reviewed.    Lab Results   Component Value Date     05/10/2019    K 4.0 05/10/2019    MG 1.7 05/03/2017    BUN 16 05/10/2019    CREATININE 0.9 05/10/2019    ALT 30 05/10/2019    AST 24 05/10/2019    HGB 13.0 05/10/2019    HCT 40.7 05/10/2019    LDLCALC 57.4 (L) 05/10/2019       Recent Labs   Lab 08/09/17  1123 04/18/18  1455 06/07/18  0950   INR 1.0 0.9 0.9       Assessment:     1. Cardiac resynchronization therapy defibrillator (CRT-D) in place    2. NICM (nonischemic cardiomyopathy)    3. Essential hypertension    4. Left bundle-branch block      Plan:     In summary, Ms. Montoya is a 64 y.o. female with NICM, LBBB, CRT-D, HTN, HLD, DM, hx of rt breast CA here for annual follow up.  Ms. Montoya is doing well from a device perspective with stable lead and device function. No arrhythmia noted. LV output increased in device clinic today based on capture threshold. 100% ventricular pacing - LV in His.  Patient feels well with no cardiac complaints.     Continue current medication regimen and device settings.   Follow up in device clinic as scheduled.   Follow up in EP clinic in 1 year, sooner as needed.     *A copy of this note has been sent to Dr. Gates*    Follow up in about 1 year (around 10/1/2020).    ------------------------------------------------------------------    REGINALD Wade, NP-C  Cardiac Electrophysiology

## 2019-10-01 ENCOUNTER — PATIENT OUTREACH (OUTPATIENT)
Dept: ADMINISTRATIVE | Facility: HOSPITAL | Age: 64
End: 2019-10-01

## 2019-10-01 ENCOUNTER — OFFICE VISIT (OUTPATIENT)
Dept: ELECTROPHYSIOLOGY | Facility: CLINIC | Age: 64
End: 2019-10-01
Attending: INTERNAL MEDICINE
Payer: COMMERCIAL

## 2019-10-01 ENCOUNTER — HOSPITAL ENCOUNTER (OUTPATIENT)
Dept: CARDIOLOGY | Facility: CLINIC | Age: 64
Discharge: HOME | End: 2019-10-01
Attending: INTERNAL MEDICINE
Payer: COMMERCIAL

## 2019-10-01 ENCOUNTER — PATIENT MESSAGE (OUTPATIENT)
Dept: FAMILY MEDICINE | Facility: CLINIC | Age: 64
End: 2019-10-01

## 2019-10-01 ENCOUNTER — CLINICAL SUPPORT (OUTPATIENT)
Dept: CARDIOLOGY | Facility: HOSPITAL | Age: 64
End: 2019-10-01
Attending: INTERNAL MEDICINE
Payer: COMMERCIAL

## 2019-10-01 VITALS
DIASTOLIC BLOOD PRESSURE: 65 MMHG | WEIGHT: 188.94 LBS | SYSTOLIC BLOOD PRESSURE: 135 MMHG | HEIGHT: 62 IN | HEART RATE: 82 BPM | BODY MASS INDEX: 34.77 KG/M2

## 2019-10-01 DIAGNOSIS — Z95.810 CARDIAC DEFIBRILLATOR IN PLACE: ICD-10-CM

## 2019-10-01 DIAGNOSIS — I44.7 LEFT BUNDLE-BRANCH BLOCK: ICD-10-CM

## 2019-10-01 DIAGNOSIS — Z95.810 CARDIAC RESYNCHRONIZATION THERAPY DEFIBRILLATOR (CRT-D) IN PLACE: Primary | ICD-10-CM

## 2019-10-01 DIAGNOSIS — I42.8 NICM (NONISCHEMIC CARDIOMYOPATHY): ICD-10-CM

## 2019-10-01 DIAGNOSIS — I10 ESSENTIAL HYPERTENSION: ICD-10-CM

## 2019-10-01 PROCEDURE — 99999 PR PBB SHADOW E&M-EST. PATIENT-LVL III: CPT | Mod: PBBFAC,,, | Performed by: NURSE PRACTITIONER

## 2019-10-01 PROCEDURE — 93005 RHYTHM STRIP: ICD-10-PCS | Mod: S$GLB,,, | Performed by: INTERNAL MEDICINE

## 2019-10-01 PROCEDURE — 93284 PRGRMG EVAL IMPLANTABLE DFB: CPT

## 2019-10-01 PROCEDURE — 93005 ELECTROCARDIOGRAM TRACING: CPT | Mod: S$GLB,,, | Performed by: INTERNAL MEDICINE

## 2019-10-01 PROCEDURE — 93010 ELECTROCARDIOGRAM REPORT: CPT | Mod: S$GLB,,, | Performed by: INTERNAL MEDICINE

## 2019-10-01 PROCEDURE — 99214 PR OFFICE/OUTPT VISIT, EST, LEVL IV, 30-39 MIN: ICD-10-PCS | Mod: S$GLB,,, | Performed by: NURSE PRACTITIONER

## 2019-10-01 PROCEDURE — 93010 RHYTHM STRIP: ICD-10-PCS | Mod: S$GLB,,, | Performed by: INTERNAL MEDICINE

## 2019-10-01 PROCEDURE — 99999 PR PBB SHADOW E&M-EST. PATIENT-LVL III: ICD-10-PCS | Mod: PBBFAC,,, | Performed by: NURSE PRACTITIONER

## 2019-10-01 PROCEDURE — 99214 OFFICE O/P EST MOD 30 MIN: CPT | Mod: S$GLB,,, | Performed by: NURSE PRACTITIONER

## 2019-10-01 NOTE — LETTER
October 1, 2019      Javy Gates MD  [address]  [facility] [address]           [facility]  [address]  [facility] [address]  Phone: [phone]  Fax: [phone]          Patient: Hannah Montoya   MR Number: [id number]   YOB: 1955   Date of Visit: 10/1/2019       Dear Dr. Javy Gates:    Thank you for referring Hannah Montoya to me for evaluation. Attached you will find relevant portions of my assessment and plan of care.    If you have questions, please do not hesitate to call me. I look forward to following Hannah Montoya along with you.    Sincerely,    Elizabeth Burk, SOCRATES    Enclosure  CC:  No Recipients    If you would like to receive this communication electronically, please contact [email or web address] or [phone] to request more information on [facility] Link access.    For providers and/or their staff who would like to refer a patient to [facility], please contact us through our one-stop-shop provider referral line, [facility], at [phone].    If you feel you have received this communication in error or would no longer like to receive these types of communications, please e-mail [email or web address]

## 2019-10-07 ENCOUNTER — CLINICAL SUPPORT (OUTPATIENT)
Dept: CARDIOLOGY | Facility: HOSPITAL | Age: 64
End: 2019-10-07
Payer: COMMERCIAL

## 2019-10-07 PROCEDURE — 93295 CARDIAC DEVICE CHECK - REMOTE: ICD-10-PCS | Mod: ,,, | Performed by: INTERNAL MEDICINE

## 2019-10-07 PROCEDURE — 93295 DEV INTERROG REMOTE 1/2/MLT: CPT | Mod: ,,, | Performed by: INTERNAL MEDICINE

## 2019-10-07 PROCEDURE — 93296 REM INTERROG EVL PM/IDS: CPT | Performed by: INTERNAL MEDICINE

## 2019-10-15 RX ORDER — SERTRALINE HYDROCHLORIDE 100 MG/1
TABLET, FILM COATED ORAL
Qty: 90 TABLET | Refills: 1 | Status: SHIPPED | OUTPATIENT
Start: 2019-10-15 | End: 2020-03-27

## 2019-10-28 ENCOUNTER — PATIENT OUTREACH (OUTPATIENT)
Dept: ADMINISTRATIVE | Facility: OTHER | Age: 64
End: 2019-10-28

## 2019-11-14 ENCOUNTER — PATIENT OUTREACH (OUTPATIENT)
Dept: ADMINISTRATIVE | Facility: OTHER | Age: 64
End: 2019-11-14

## 2019-11-20 RX ORDER — METFORMIN HYDROCHLORIDE 500 MG/1
TABLET ORAL
Qty: 180 TABLET | Refills: 3 | OUTPATIENT
Start: 2019-11-20

## 2019-12-06 DIAGNOSIS — E11.9 TYPE 2 DIABETES MELLITUS WITHOUT COMPLICATION: ICD-10-CM

## 2019-12-09 DIAGNOSIS — E11.9 TYPE 2 DIABETES MELLITUS WITHOUT COMPLICATION, WITHOUT LONG-TERM CURRENT USE OF INSULIN: ICD-10-CM

## 2019-12-09 RX ORDER — ATORVASTATIN CALCIUM 10 MG/1
10 TABLET, FILM COATED ORAL DAILY
Qty: 90 TABLET | Refills: 3 | Status: SHIPPED | OUTPATIENT
Start: 2019-12-09 | End: 2019-12-23 | Stop reason: SDUPTHER

## 2019-12-18 RX ORDER — METFORMIN HYDROCHLORIDE 500 MG/1
TABLET ORAL
Qty: 60 TABLET | OUTPATIENT
Start: 2019-12-18

## 2019-12-23 DIAGNOSIS — E11.9 TYPE 2 DIABETES MELLITUS WITHOUT COMPLICATION, WITHOUT LONG-TERM CURRENT USE OF INSULIN: ICD-10-CM

## 2019-12-24 RX ORDER — ATORVASTATIN CALCIUM 10 MG/1
10 TABLET, FILM COATED ORAL DAILY
Qty: 90 TABLET | Refills: 3 | Status: SHIPPED | OUTPATIENT
Start: 2019-12-24 | End: 2020-02-06 | Stop reason: SDUPTHER

## 2019-12-28 DIAGNOSIS — J44.9 CHRONIC OBSTRUCTIVE PULMONARY DISEASE, UNSPECIFIED COPD TYPE: ICD-10-CM

## 2019-12-28 DIAGNOSIS — J44.1 COPD EXACERBATION: ICD-10-CM

## 2019-12-30 RX ORDER — IPRATROPIUM BROMIDE AND ALBUTEROL SULFATE 2.5; .5 MG/3ML; MG/3ML
SOLUTION RESPIRATORY (INHALATION)
Qty: 30 ML | Refills: 0 | Status: SHIPPED | OUTPATIENT
Start: 2019-12-30 | End: 2020-03-16 | Stop reason: SDUPTHER

## 2020-01-02 ENCOUNTER — OFFICE VISIT (OUTPATIENT)
Dept: FAMILY MEDICINE | Facility: CLINIC | Age: 65
End: 2020-01-02
Payer: COMMERCIAL

## 2020-01-02 VITALS
RESPIRATION RATE: 16 BRPM | DIASTOLIC BLOOD PRESSURE: 76 MMHG | OXYGEN SATURATION: 95 % | BODY MASS INDEX: 35.83 KG/M2 | WEIGHT: 194.69 LBS | HEIGHT: 62 IN | SYSTOLIC BLOOD PRESSURE: 144 MMHG | TEMPERATURE: 99 F | HEART RATE: 88 BPM

## 2020-01-02 DIAGNOSIS — J44.1 COPD WITH ACUTE EXACERBATION: Primary | ICD-10-CM

## 2020-01-02 PROCEDURE — 3008F PR BODY MASS INDEX (BMI) DOCUMENTED: ICD-10-PCS | Mod: CPTII,S$GLB,, | Performed by: NURSE PRACTITIONER

## 2020-01-02 PROCEDURE — 99214 OFFICE O/P EST MOD 30 MIN: CPT | Mod: S$GLB,,, | Performed by: NURSE PRACTITIONER

## 2020-01-02 PROCEDURE — 99214 PR OFFICE/OUTPT VISIT, EST, LEVL IV, 30-39 MIN: ICD-10-PCS | Mod: S$GLB,,, | Performed by: NURSE PRACTITIONER

## 2020-01-02 PROCEDURE — 3078F PR MOST RECENT DIASTOLIC BLOOD PRESSURE < 80 MM HG: ICD-10-PCS | Mod: CPTII,S$GLB,, | Performed by: NURSE PRACTITIONER

## 2020-01-02 PROCEDURE — 3077F SYST BP >= 140 MM HG: CPT | Mod: CPTII,S$GLB,, | Performed by: NURSE PRACTITIONER

## 2020-01-02 PROCEDURE — 3078F DIAST BP <80 MM HG: CPT | Mod: CPTII,S$GLB,, | Performed by: NURSE PRACTITIONER

## 2020-01-02 PROCEDURE — 3008F BODY MASS INDEX DOCD: CPT | Mod: CPTII,S$GLB,, | Performed by: NURSE PRACTITIONER

## 2020-01-02 PROCEDURE — 99999 PR PBB SHADOW E&M-EST. PATIENT-LVL III: CPT | Mod: PBBFAC,,, | Performed by: NURSE PRACTITIONER

## 2020-01-02 PROCEDURE — 3077F PR MOST RECENT SYSTOLIC BLOOD PRESSURE >= 140 MM HG: ICD-10-PCS | Mod: CPTII,S$GLB,, | Performed by: NURSE PRACTITIONER

## 2020-01-02 PROCEDURE — 99999 PR PBB SHADOW E&M-EST. PATIENT-LVL III: ICD-10-PCS | Mod: PBBFAC,,, | Performed by: NURSE PRACTITIONER

## 2020-01-02 RX ORDER — FLUTICASONE PROPIONATE AND SALMETEROL 250; 50 UG/1; UG/1
1 POWDER RESPIRATORY (INHALATION) 2 TIMES DAILY
Qty: 60 EACH | Refills: 0 | Status: SHIPPED | OUTPATIENT
Start: 2020-01-02 | End: 2020-02-07

## 2020-01-02 RX ORDER — DOXYCYCLINE HYCLATE 100 MG
100 TABLET ORAL 2 TIMES DAILY
Qty: 14 TABLET | Refills: 0 | Status: SHIPPED | OUTPATIENT
Start: 2020-01-02 | End: 2020-03-11 | Stop reason: CLARIF

## 2020-01-02 RX ORDER — PREDNISONE 20 MG/1
60 TABLET ORAL DAILY
Qty: 15 TABLET | Refills: 0 | Status: SHIPPED | OUTPATIENT
Start: 2020-01-02 | End: 2020-03-12 | Stop reason: SDUPTHER

## 2020-01-02 NOTE — PROGRESS NOTES
Routine Office Visit    Patient Name: Hannah Montoya    : 1955  MRN: [id number]    Chief Complaint:  Cough    Subjective:  Hannah is a 64 y.o. female who presents today for:    1.  Cough - patient presents today for evaluation of sore throat, cough, and runny nose for the last 2 days.  Has a history of type 2 diabetes, nonischemic cardiomyopathy, and COPD.  She does endorse chronic shortness of breath which requires her to use her albuterol with ipratropium nebulizer at least once a day per her report.  In the last 2 days she endorses chills and wheezing but states that she has never checked her temperature.  Denies any chest pain or palpitations.  She has quit smoking.  She has gotten the flu shot this year.  For the symptoms she has tried Mucinex as well as albuterol every 6 hr in the last couple of days both of which have only helped mildly.  Denies any nausea, vomiting, diarrhea, or abdominal pain. Patient is known to me and this is the extent of her concerns at this time.  Of note, patient has been prescribed Pulmicort in the past but she has stopped taking this citing concerns with insurance coverage of the medication.    Past Medical History  Past Medical History:   Diagnosis Date    Asthma     bronchitis in past    Breast cancer 2016    right    Cardiac pacemaker     Cardiomyopathy     COPD (chronic obstructive pulmonary disease)     Diabetes mellitus, type 2     Hyperglycemia     Hyperlipidemia     Hypertension        Past Surgical History  Past Surgical History:   Procedure Laterality Date    BREAST BIOPSY Right 2016    [facility]    BREAST LUMPECTOMY Right      SECTION      x2    CHOLECYSTECTOMY      REVISION OF IMPLANTABLE CARDIOVERTER-DEFIBRILLATOR (ICD) ELECTRODE LEAD PLACEMENT N/A 6/15/2018    Procedure: REVISION-LEAD-ICD;  Surgeon: Javy Gates MD;  Location: [facility] CATH LAB;  Service: Cardiology;  Laterality: N/A;  LBBB, Bi-V ICD HIS Elmo rev, MDT, Choice, MB, 3 Prep     TUBAL LIGATION         Family History  Family History   Problem Relation Age of Onset    Kidney disease Mother     Kidney disease Father     Clotting disorder Brother     Breast cancer Neg Hx     Ovarian cancer Neg Hx        Social History  Social History     Socioeconomic History    Marital status:      Spouse name: Not on file    Number of children: Not on file    Years of education: Not on file    Highest education level: Not on file   Occupational History     Employer: [employer]   Social Needs    Financial resource strain: Not on file    Food insecurity:     Worry: Not on file     Inability: Not on file    Transportation needs:     Medical: Not on file     Non-medical: Not on file   Tobacco Use    Smoking status: Former Smoker     Packs/day: 0.50     Years: 40.00     Pack years: 20.00     Last attempt to quit: 8/1/2016     Years since quitting: 3.4    Smokeless tobacco: Never Used   Substance and Sexual Activity    Alcohol use: Yes     Alcohol/week: 0.0 standard drinks     Comment: rare- holiday    Drug use: No    Sexual activity: Not on file   Lifestyle    Physical activity:     Days per week: Not on file     Minutes per session: Not on file    Stress: Not on file   Relationships    Social connections:     Talks on phone: Not on file     Gets together: Not on file     Attends Sikh service: Not on file     Active member of club or organization: Not on file     Attends meetings of clubs or organizations: Not on file     Relationship status: Not on file   Other Topics Concern    Not on file   Social History Narrative    Not on file       Current Medications  Current Outpatient Medications on File Prior to Visit   Medication Sig Dispense Refill    albuterol (PROAIR HFA) 90 mcg/actuation inhaler Inhale 2 puffs into the lungs every 6 (six) hours as needed. Rescue 17 g 5    albuterol-ipratropium (DUO-NEB) 2.5 mg-0.5 mg/3 mL nebulizer solution Use 1 vial IN NEBULIZER EVERY  6 HOURS AS NEEDED FOR FOR WHEEZING 30 mL 0    amoxicillin (AMOXIL) 500 MG Tab Take 1 tablet (500 mg total) by mouth 2 (two) times daily. 20 tablet 0    atorvastatin (LIPITOR) 10 MG tablet Take 1 tablet (10 mg total) by mouth once daily. 90 tablet 3    carvedilol (COREG) 25 MG tablet TAKE 1 TABLET(25 MG) BY MOUTH TWICE DAILY 180 tablet 3    cetirizine (ZYRTEC) 10 MG tablet Take 10 mg by mouth every evening.       doxycycline (VIBRA-TABS) 100 MG tablet Take 1 tablet (100 mg total) by mouth every 12 (twelve) hours. (Patient not taking: Reported on 10/1/2019) 14 tablet 0    fluticasone propionate (FLONASE) 50 mcg/actuation nasal spray 1 spray (50 mcg total) by Each Nare route once daily. 15.8 mL 0    furosemide (LASIX) 40 MG tablet TAKE 1 TABLET(40 MG) BY MOUTH TWICE DAILY 180 tablet 3    ibuprofen (ADVIL,MOTRIN) 800 MG tablet Take 1 tablet (800 mg total) by mouth every 8 (eight) hours as needed for Pain. (Patient not taking: Reported on 10/1/2019) 30 tablet 0    lisinopril 10 MG tablet Take 1 tablet (10 mg total) by mouth once daily. (Patient taking differently: Take 10 mg by mouth as needed. ) 90 tablet 3    metFORMIN (GLUCOPHAGE) 500 MG tablet TAKE ONE TABLET BY MOUTH TWICE DAILY 180 tablet 3    multivitamin (ONE DAILY MULTIVITAMIN) per tablet Take 1 tablet by mouth once daily.      PULMICORT FLEXHALER 180 mcg/actuation AePB INHALE 2 PUFFS BY MOUTH TWICE DAILY 1 each 6    sertraline (ZOLOFT) 100 MG tablet TAKE ONE TABLET BY MOUTH EVERY MORNING 90 tablet 1    TRUETEST TEST STRIPS Strp test once EVERY MORNING 100 strip 1     No current facility-administered medications on file prior to visit.        Allergies   Review of patient's allergies indicates:   Allergen Reactions    Adhesive Rash       Review of Systems (Pertinent positives)  Review of Systems   Constitutional: Positive for chills and malaise/fatigue. Negative for diaphoresis, fever and weight loss.   HENT: Positive for congestion, ear pain and  "sore throat. Negative for ear discharge, hearing loss, nosebleeds, sinus pain and tinnitus.    Eyes: Negative.    Respiratory: Positive for cough, sputum production, shortness of breath and wheezing. Negative for hemoptysis and stridor.    Cardiovascular: Negative for chest pain, palpitations, orthopnea, claudication, leg swelling and PND.   Gastrointestinal: Negative.    Genitourinary: Negative.    Musculoskeletal: Negative.    Skin: Negative.    Neurological: Positive for headaches. Negative for dizziness, tingling, tremors, sensory change, speech change, focal weakness, seizures, loss of consciousness and weakness.   Endo/Heme/Allergies: Negative.    Psychiatric/Behavioral: Negative.        BP (!) 144/76 (BP Location: Right arm, Patient Position: Sitting, BP Method: Small (Manual))   Pulse 88   Temp 98.9 °F (37.2 °C) (Oral)   Resp 16   Ht 5' 2" (1.575 m)   Wt 88.3 kg (194 lb 10.7 oz)   LMP  (LMP Unknown)   SpO2 95%   BMI 35.60 kg/m²     Physical Exam   Constitutional: She is oriented to person, place, and time. She appears well-developed and well-nourished.  Non-toxic appearance. She does not have a sickly appearance. She does not appear ill. No distress.   HENT:   Head: Normocephalic and atraumatic.   Right Ear: Tympanic membrane, external ear and ear canal normal.   Left Ear: Tympanic membrane, external ear and ear canal normal.   Nose: Mucosal edema and rhinorrhea present. Right sinus exhibits no maxillary sinus tenderness and no frontal sinus tenderness. Left sinus exhibits no maxillary sinus tenderness and no frontal sinus tenderness.   Mouth/Throat: Uvula is midline, oropharynx is clear and moist and mucous membranes are normal. Tonsils are 0 on the right. Tonsils are 0 on the left. No tonsillar exudate.   Eyes: Pupils are equal, round, and reactive to light. Conjunctivae and EOM are normal.   Neck: Normal range of motion. Neck supple. No JVD present.   Cardiovascular: Normal rate, regular rhythm, " normal heart sounds and intact distal pulses. Exam reveals no gallop and no friction rub.   No murmur heard.  Clinically euvolemic no JVD no lower extremity swelling   Pulmonary/Chest: Effort normal. No accessory muscle usage or stridor. No tachypnea. No respiratory distress. She has no decreased breath sounds. She has wheezes in the right lower field and the left lower field. She has no rhonchi. She has no rales.   Slight inspiratory wheezes noted to bilateral lower lung fields no rhonch no rales no other adventitious sounds   Lymphadenopathy:     She has no cervical adenopathy.   Neurological: She is alert and oriented to person, place, and time.   Skin: Skin is warm and dry. Capillary refill takes less than 2 seconds. She is not diaphoretic.   Vitals reviewed.       Assessment/Plan:  Hannah Montoya is a 64 y.o. female who presents today for :    Hannah was seen today for cough, headache and fatigue.    Diagnoses and all orders for this visit:    COPD with acute exacerbation  -     fluticasone-salmeterol diskus inhaler 250-50 mcg; Inhale 1 puff into the lungs 2 (two) times daily. Controller  -     doxycycline (VIBRA-TABS) 100 MG tablet; Take 1 tablet (100 mg total) by mouth 2 (two) times daily.  -     predniSONE (DELTASONE) 20 MG tablet; Take 3 tablets (60 mg total) by mouth once daily.     Patient to take all medications as prescribed.  Warned patient about risk of photosensitivity/sunburn while taking doxycycline.  Daily prednisone for COPD exacerbation warned patient about potential risks of systemic steroids.  Patient does need better control of her COPD.  She is using her albuterol breathing treatments at least once per day for chronic shortness of breath.  Will add Advair instead of Pulmicort to start for control the COPD.  Potential side effects and risks of the Advair were discussed.  Educated patient returns mouth out after using inhaler to decrease risk for oral thrush with the medication.  Will  have patient follow up with PCP in 1 month for a physical.  Patient is to return to the clinic or follow up as needed in the meantime for further concerns.  She is DO to the emergency room for any wheezing or shortness of breath unrelieved by p.r.n. albuterol.  Patient verbalized understanding of instructions.        This office note has been dictated.  This dictation has been generated using M-Modal Fluency Direct dictation; some phonetic errors may occur.   My collaborating physician is Dr. Zhou Coy.

## 2020-01-05 ENCOUNTER — CLINICAL SUPPORT (OUTPATIENT)
Dept: CARDIOLOGY | Facility: HOSPITAL | Age: 65
End: 2020-01-05
Payer: COMMERCIAL

## 2020-01-05 DIAGNOSIS — Z95.810 PRESENCE OF AUTOMATIC (IMPLANTABLE) CARDIAC DEFIBRILLATOR: ICD-10-CM

## 2020-01-05 DIAGNOSIS — I42.5 OTHER RESTRICTIVE CARDIOMYOPATHY: ICD-10-CM

## 2020-01-05 PROCEDURE — 93296 REM INTERROG EVL PM/IDS: CPT | Performed by: INTERNAL MEDICINE

## 2020-01-05 PROCEDURE — 93295 DEV INTERROG REMOTE 1/2/MLT: CPT | Mod: ,,, | Performed by: INTERNAL MEDICINE

## 2020-01-05 PROCEDURE — 93295 CARDIAC DEVICE CHECK - REMOTE: ICD-10-PCS | Mod: ,,, | Performed by: INTERNAL MEDICINE

## 2020-01-08 ENCOUNTER — PATIENT MESSAGE (OUTPATIENT)
Dept: FAMILY MEDICINE | Facility: CLINIC | Age: 65
End: 2020-01-08

## 2020-01-08 ENCOUNTER — PATIENT MESSAGE (OUTPATIENT)
Dept: ELECTROPHYSIOLOGY | Facility: CLINIC | Age: 65
End: 2020-01-08

## 2020-01-08 NOTE — TELEPHONE ENCOUNTER
Spoke with Ms. Montoya. I advised her to follow up with her primary care doctor in regards to her Lasix, diarrhea and blood pressure. Patient verbalized understanding and appreciates call.

## 2020-01-09 ENCOUNTER — OFFICE VISIT (OUTPATIENT)
Dept: FAMILY MEDICINE | Facility: CLINIC | Age: 65
End: 2020-01-09
Payer: COMMERCIAL

## 2020-01-09 ENCOUNTER — NURSE TRIAGE (OUTPATIENT)
Dept: ADMINISTRATIVE | Facility: CLINIC | Age: 65
End: 2020-01-09

## 2020-01-09 VITALS
WEIGHT: 188.25 LBS | HEART RATE: 73 BPM | BODY MASS INDEX: 34.64 KG/M2 | RESPIRATION RATE: 16 BRPM | HEIGHT: 62 IN | TEMPERATURE: 98 F | SYSTOLIC BLOOD PRESSURE: 144 MMHG | OXYGEN SATURATION: 95 % | DIASTOLIC BLOOD PRESSURE: 88 MMHG

## 2020-01-09 DIAGNOSIS — J44.1 COPD WITH ACUTE EXACERBATION: ICD-10-CM

## 2020-01-09 DIAGNOSIS — K52.1 ANTIBIOTIC-ASSOCIATED DIARRHEA: Primary | ICD-10-CM

## 2020-01-09 DIAGNOSIS — T36.95XA ANTIBIOTIC-ASSOCIATED DIARRHEA: Primary | ICD-10-CM

## 2020-01-09 PROCEDURE — 99213 OFFICE O/P EST LOW 20 MIN: CPT | Mod: S$GLB,,, | Performed by: NURSE PRACTITIONER

## 2020-01-09 PROCEDURE — 99213 PR OFFICE/OUTPT VISIT, EST, LEVL III, 20-29 MIN: ICD-10-PCS | Mod: S$GLB,,, | Performed by: NURSE PRACTITIONER

## 2020-01-09 PROCEDURE — 3079F DIAST BP 80-89 MM HG: CPT | Mod: CPTII,S$GLB,, | Performed by: NURSE PRACTITIONER

## 2020-01-09 PROCEDURE — 3077F SYST BP >= 140 MM HG: CPT | Mod: CPTII,S$GLB,, | Performed by: NURSE PRACTITIONER

## 2020-01-09 PROCEDURE — 99999 PR PBB SHADOW E&M-EST. PATIENT-LVL III: CPT | Mod: PBBFAC,,, | Performed by: NURSE PRACTITIONER

## 2020-01-09 PROCEDURE — 3077F PR MOST RECENT SYSTOLIC BLOOD PRESSURE >= 140 MM HG: ICD-10-PCS | Mod: CPTII,S$GLB,, | Performed by: NURSE PRACTITIONER

## 2020-01-09 PROCEDURE — 99999 PR PBB SHADOW E&M-EST. PATIENT-LVL III: ICD-10-PCS | Mod: PBBFAC,,, | Performed by: NURSE PRACTITIONER

## 2020-01-09 PROCEDURE — 3008F BODY MASS INDEX DOCD: CPT | Mod: CPTII,S$GLB,, | Performed by: NURSE PRACTITIONER

## 2020-01-09 PROCEDURE — 3008F PR BODY MASS INDEX (BMI) DOCUMENTED: ICD-10-PCS | Mod: CPTII,S$GLB,, | Performed by: NURSE PRACTITIONER

## 2020-01-09 PROCEDURE — 3079F PR MOST RECENT DIASTOLIC BLOOD PRESSURE 80-89 MM HG: ICD-10-PCS | Mod: CPTII,S$GLB,, | Performed by: NURSE PRACTITIONER

## 2020-01-09 NOTE — PROGRESS NOTES
Routine Office Visit    Patient Name: Hannah Montoya    : 1955  MRN: [id number]    Chief Complaint:  Diarrhea    Subjective:  Hannah is a 64 y.o. female who presents today for:    1.  Diarrhea, low blood pressure - patient presents today for evaluation of diarrhea.  One week ago today was treated by me for a COPD exacerbation with prednisone and doxycycline.  She Has been taking the antibiotics and steroids and states that 2 days ago she started having watery diarrhea which worsened yesterday.  She states that yesterday she did have 7 bouts of watery diarrhea with nausea.  No emesis.  No fevers, chills, chest pain, shortness of breath, wheezing, or palpitations.  She states that when she had the diarrhea she did continue taking her 40 mg of Lasix 2 times per day, and yesterday she felt lightheaded and dizzy with presyncope and checked her blood pressure at home and it was 80/38.  She did not take her evening dose of Lasix yesterday and has been drinking plenty of liquids since the episode of hypotension and dizziness.  this morning she checked her blood pressure and it was in the 120s over 90s.  She denies any continuing dizziness, lightheadedness, presyncope, blurry vision, visual disturbances, or other neurologic symptoms since yesterday.  She denies any significant abdominal pain but does report a gurgling sensation in her abdomen and slight abdominal soreness.  She states that her breathing and respirations are much improved after taking the doxycycline and prednisone.  She states that she finished her last dose of the doxycycline today.  Patient is known to me and this is the extent of her concerns at this time.    Past Medical History  Past Medical History:   Diagnosis Date    Asthma     bronchitis in past    Breast cancer 2016    right    Cardiac pacemaker     Cardiomyopathy     COPD (chronic obstructive pulmonary disease)     Diabetes mellitus, type 2     Hyperglycemia     Hyperlipidemia      Hypertension        Past Surgical History  Past Surgical History:   Procedure Laterality Date    [facility]OPSY Right 2016    [facility]    BREAST LUMPECTOMY Right      SECTION      x2    CHOLECYSTECTOMY      REVISION OF IMPLANTABLE CARDIOVERTER-DEFIBRILLATOR (ICD) ELECTRODE LEAD PLACEMENT N/A 6/15/2018    Procedure: REVISION-LEAD-ICD;  Surgeon: Javy Gates MD;  Location: [facility] CATH LAB;  Service: Cardiology;  Laterality: N/A;  LBBB, Bi-V ICD HIS Ld rev, MDT, Choice, MB, 3 Prep    TUBAL LIGATION         Family History  Family History   Problem Relation Age of Onset    Kidney disease Mother     Kidney disease Father     Clotting disorder Brother     Breast cancer Neg Hx     Ovarian cancer Neg Hx        Social History  Social History     Socioeconomic History    Marital status:      Spouse name: Not on file    Number of children: Not on file    Years of education: Not on file    Highest education level: Not on file   Occupational History     Employer: [employer]   Social Needs    Financial resource strain: Not on file    Food insecurity:     Worry: Not on file     Inability: Not on file    Transportation needs:     Medical: Not on file     Non-medical: Not on file   Tobacco Use    Smoking status: Former Smoker     Packs/day: 0.50     Years: 40.00     Pack years: 20.00     Last attempt to quit: 2016     Years since quitting: 3.4    Smokeless tobacco: Never Used   Substance and Sexual Activity    Alcohol use: Yes     Alcohol/week: 0.0 standard drinks     Comment: rare- holiday    Drug use: No    Sexual activity: Not on file   Lifestyle    Physical activity:     Days per week: Not on file     Minutes per session: Not on file    Stress: Not on file   Relationships    Social connections:     Talks on phone: Not on file     Gets together: Not on file     Attends Yazidi service: Not on file     Active member of club or organization: Not on file     Attends meetings  of clubs or organizations: Not on file     Relationship status: Not on file   Other Topics Concern    Not on file   Social History Narrative    Not on file       Current Medications  Current Outpatient Medications on File Prior to Visit   Medication Sig Dispense Refill    albuterol (PROAIR HFA) 90 mcg/actuation inhaler Inhale 2 puffs into the lungs every 6 (six) hours as needed. Rescue 17 g 5    albuterol-ipratropium (DUO-NEB) 2.5 mg-0.5 mg/3 mL nebulizer solution Use 1 vial IN NEBULIZER EVERY 6 HOURS AS NEEDED FOR FOR WHEEZING 30 mL 0    atorvastatin (LIPITOR) 10 MG tablet Take 1 tablet (10 mg total) by mouth once daily. 90 tablet 3    carvedilol (COREG) 25 MG tablet TAKE 1 TABLET(25 MG) BY MOUTH TWICE DAILY 180 tablet 3    doxycycline (VIBRA-TABS) 100 MG tablet Take 1 tablet (100 mg total) by mouth 2 (two) times daily. 14 tablet 0    fluticasone propionate (FLONASE) 50 mcg/actuation nasal spray 1 spray (50 mcg total) by Each Nare route once daily. 15.8 mL 0    fluticasone-salmeterol diskus inhaler 250-50 mcg Inhale 1 puff into the lungs 2 (two) times daily. Controller 60 each 0    furosemide (LASIX) 40 MG tablet TAKE 1 TABLET(40 MG) BY MOUTH TWICE DAILY 180 tablet 3    metFORMIN (GLUCOPHAGE) 500 MG tablet TAKE ONE TABLET BY MOUTH TWICE DAILY 180 tablet 3    multivitamin (ONE DAILY MULTIVITAMIN) per tablet Take 1 tablet by mouth once daily.      predniSONE (DELTASONE) 20 MG tablet Take 3 tablets (60 mg total) by mouth once daily. 15 tablet 0    sertraline (ZOLOFT) 100 MG tablet TAKE ONE TABLET BY MOUTH EVERY MORNING 90 tablet 1    TRUETEST TEST STRIPS Strp test once EVERY MORNING 100 strip 1    cetirizine (ZYRTEC) 10 MG tablet Take 10 mg by mouth every evening.       doxycycline (VIBRA-TABS) 100 MG tablet Take 1 tablet (100 mg total) by mouth every 12 (twelve) hours. (Patient not taking: Reported on 10/1/2019) 14 tablet 0    ibuprofen (ADVIL,MOTRIN) 800 MG tablet Take 1 tablet (800 mg total) by  "mouth every 8 (eight) hours as needed for Pain. (Patient not taking: Reported on 10/1/2019) 30 tablet 0    lisinopril 10 MG tablet Take 1 tablet (10 mg total) by mouth once daily. (Patient taking differently: Take 10 mg by mouth as needed. ) 90 tablet 3    PULMICORT FLEXHALER 180 mcg/actuation AePB INHALE 2 PUFFS BY MOUTH TWICE DAILY (Patient not taking: Reported on 1/2/2020) 1 each 6    [DISCONTINUED] amoxicillin (AMOXIL) 500 MG Tab Take 1 tablet (500 mg total) by mouth 2 (two) times daily. 20 tablet 0     No current facility-administered medications on file prior to visit.        Allergies   Review of patient's allergies indicates:   Allergen Reactions    Adhesive Rash       Review of Systems (Pertinent positives)  Review of Systems   Constitutional: Negative.    HENT: Negative.    Eyes: Negative.    Respiratory: Negative for cough, hemoptysis, sputum production, shortness of breath and wheezing.    Cardiovascular: Negative for chest pain, palpitations, orthopnea, claudication, leg swelling and PND.   Gastrointestinal: Positive for diarrhea and nausea. Negative for abdominal pain, blood in stool, constipation, heartburn, melena and vomiting.        Abdominal soreness   Genitourinary: Negative.    Musculoskeletal: Negative.    Skin: Negative.    Neurological: Positive for dizziness. Negative for tingling, tremors, sensory change, speech change, focal weakness, seizures, loss of consciousness, weakness and headaches.   Psychiatric/Behavioral: Negative.        BP (!) 144/88 (BP Location: Right arm, Patient Position: Sitting, BP Method: Medium (Manual))   Pulse 73   Temp 98 °F (36.7 °C) (Oral)   Resp 16   Ht 5' 2" (1.575 m)   Wt 85.4 kg (188 lb 4.4 oz)   LMP  (LMP Unknown)   SpO2 95%   BMI 34.44 kg/m²     Physical Exam   Constitutional: She is oriented to person, place, and time. She appears well-developed and well-nourished.  Non-toxic appearance. She does not have a sickly appearance. She does not " appear ill. No distress.   HENT:   Mouth/Throat: Oropharynx is clear and moist.       Eyes: Pupils are equal, round, and reactive to light. Conjunctivae and EOM are normal.   Neck: Normal range of motion. Neck supple. No JVD present.   Cardiovascular: Normal rate, regular rhythm, normal heart sounds and intact distal pulses. Exam reveals no gallop and no friction rub.   No murmur heard.  Clinically euvolemic no JVD no lower extremity swelling   Pulmonary/Chest: Effort normal and breath sounds normal. No accessory muscle usage or stridor. No tachypnea. No respiratory distress. She has no decreased breath sounds. She has no wheezes. She has no rhonchi. She has no rales. She exhibits no tenderness.   Good air movement to bases bilaterally   Abdominal: Soft. Normal appearance and bowel sounds are normal. There is no hepatosplenomegaly. There is generalized tenderness. There is no rigidity, no rebound, no guarding, no CVA tenderness, no tenderness at McBurney's point and negative Puente's sign. No hernia.   Slight abdominal generalized tenderness on examination no rigidity no guarding no rebound   Lymphadenopathy:     She has no cervical adenopathy.   Neurological: She is alert and oriented to person, place, and time. She displays normal reflexes. No cranial nerve deficit or sensory deficit. She exhibits normal muscle tone. Coordination normal.   Skin: Skin is warm and dry. Capillary refill takes less than 2 seconds. She is not diaphoretic.   Skin turgor   Vitals reviewed.       Assessment/Plan:  Hannah Montoya is a 64 y.o. female who presents today for :    Hannah was seen today for hypotension.    Diagnoses and all orders for this visit:    Antibiotic-associated diarrhea    Orthostatics symptoms resolved.  Patient reports diarrhea is decreasing in frequency and she has only had 1 episode of diarrhea today.  She is finished with the doxycycline regimen.  Encouraged patient to take an over-the-counter probiotic and  drink plenty of liquids and water.  No alarm signs or symptoms.  No dizziness or lightheadedness since yesterday.  Blood pressure is back to baseline today.  Instructed patient that if tomorrow she is diarrhea free she can take 40 mg of Lasix 1 time per day for 2-3 days then resume her original regimen of 40 mg twice per day.  She is to continue taking her blood pressure as needed at home and to contact the clinic if she gets low readings again.    COPD with acute exacerbation    Resolved.  Breath sounds and symptoms much improved today.    Follow-up as needed.  Patient verbalized understanding of instructions.        This office note has been dictated.  This dictation has been generated using M-Modal Fluency Direct dictation; some phonetic errors may occur.   My collaborating physician is Dr. Zhou Coy.

## 2020-01-09 NOTE — TELEPHONE ENCOUNTER
Patient states that her /88 but also states yesterday it was 80s/30s. Patient states that her BP has been fluctuating. Appointment scheduled for today. Verbalized understanding.     Reason for Disposition   Patient wants to be seen    Additional Information   Negative: Sounds like a life-threatening emergency to the triager   Negative: BP > 160 / 100 and any cardiac or neurologic symptoms (e.g., chest pain, difficulty breathing, unsteady gait, blurred vision)   Negative: Patient sounds very sick or weak to the triager   Negative: BP = 180/110 and missed most recent dose of blood pressure medication    Protocols used: ST HIGH BLOOD PRESSURE-A-OH

## 2020-01-16 ENCOUNTER — PATIENT MESSAGE (OUTPATIENT)
Dept: FAMILY MEDICINE | Facility: CLINIC | Age: 65
End: 2020-01-16

## 2020-01-18 ENCOUNTER — HOSPITAL ENCOUNTER (EMERGENCY)
Facility: HOSPITAL | Age: 65
Discharge: HOME | End: 2020-01-18
Attending: EMERGENCY MEDICINE
Payer: COMMERCIAL

## 2020-01-18 VITALS
HEIGHT: 62 IN | OXYGEN SATURATION: 94 % | DIASTOLIC BLOOD PRESSURE: 63 MMHG | HEART RATE: 76 BPM | WEIGHT: 185 LBS | TEMPERATURE: 100 F | SYSTOLIC BLOOD PRESSURE: 141 MMHG | RESPIRATION RATE: 18 BRPM | BODY MASS INDEX: 34.04 KG/M2

## 2020-01-18 DIAGNOSIS — B34.9 VIRAL SYNDROME: Primary | ICD-10-CM

## 2020-01-18 DIAGNOSIS — R09.81 SINUS CONGESTION: ICD-10-CM

## 2020-01-18 LAB
CTP QC/QA: YES
DEPRECATED S PYO AG THROAT QL EIA: NEGATIVE
POC MOLECULAR INFLUENZA A AGN: NEGATIVE
POC MOLECULAR INFLUENZA B AGN: NEGATIVE

## 2020-01-18 PROCEDURE — 87880 STREP A ASSAY W/OPTIC: CPT

## 2020-01-18 PROCEDURE — 87081 CULTURE SCREEN ONLY: CPT

## 2020-01-18 PROCEDURE — 99283 EMERGENCY DEPT VISIT LOW MDM: CPT | Mod: 25

## 2020-01-18 PROCEDURE — 87502 INFLUENZA DNA AMP PROBE: CPT

## 2020-01-18 RX ORDER — FLUTICASONE PROPIONATE 50 MCG
1 SPRAY, SUSPENSION (ML) NASAL 2 TIMES DAILY PRN
Qty: 15 G | Refills: 0 | OUTPATIENT
Start: 2020-01-18 | End: 2020-03-12 | Stop reason: HOSPADM

## 2020-01-18 NOTE — DISCHARGE INSTRUCTIONS
Follow-up with your regular doctor if your symptoms do not improve in several days.  Return to the emergency department immediately for any new or worsening symptoms.    Thank you for coming to our Emergency Department today. It is important to remember that some problems are difficult to diagnose and may not be found during your first visit. Be sure to follow up with your primary care doctor.  If you do not have one, you may contact the one listed on your discharge paperwork or you may also call the [facility] Appointment Desk at [phone] to schedule an appointment with one.     Return to the ER with any questions/concerns, new/concerning symptoms, worsening or failure to improve. Do not drive or make any important decisions for 24 hours if you have received any pain medications, sedatives or mood altering drugs during your ER visit.

## 2020-01-18 NOTE — ED PROVIDER NOTES
Encounter Date: 2020    SCRIBE #1 NOTE: I, Carey Castro , am scribing for, and in the presence of,  Eliezer Wang NP . I have scribed the following portions of the note - Other sections scribed: HPI, ROS, PE .       History     Chief Complaint   Patient presents with    Sore Throat     Pt c/o sore throat, ear pain, HA and nasal congestion x 3 days. Denies fever      CC: Sore Throat    HPI:  This is a 64 y.o. female who presents to the Emergency Department with a cc of sore throat that began 2 days ago. She reports associated rhinorrhea, cough, and fluid in bilateral ears. Patient notes sinus pain between and under the eyes. Denies any ear pain or fever. She reports taking Zyrtec with mild relief. Patient reports a COPD flare- up 2 weeks ago and started taking prescribed Doxycycline and Prednisone x 1 week.       The history is provided by the patient.     Review of patient's allergies indicates:   Allergen Reactions    Adhesive Rash     Past Medical History:   Diagnosis Date    Asthma     bronchitis in past    Breast cancer 2016    right    Cardiac pacemaker     Cardiomyopathy     COPD (chronic obstructive pulmonary disease)     Diabetes mellitus, type 2     Hyperglycemia     Hyperlipidemia     Hypertension      Past Surgical History:   Procedure Laterality Date    [facility]SY Right 2016    [facility]    BREAST LUMPECTOMY Right      SECTION      x2    CHOLECYSTECTOMY      REVISION OF IMPLANTABLE CARDIOVERTER-DEFIBRILLATOR (ICD) ELECTRODE LEAD PLACEMENT N/A 6/15/2018    Procedure: REVISION-LEAD-ICD;  Surgeon: Javy Gates MD;  Location: [facility] CATH LAB;  Service: Cardiology;  Laterality: N/A;  LBBB, Bi-V ICD HIS Ld rev, MDT, Choice, MB, 3 Prep    TUBAL LIGATION       Family History   Problem Relation Age of Onset    Kidney disease Mother     Kidney disease Father     Clotting disorder Brother     Breast cancer Neg Hx     Ovarian cancer Neg Hx      Social History     Tobacco Use     Smoking status: Former Smoker     Packs/day: 0.50     Years: 40.00     Pack years: 20.00     Last attempt to quit: 8/1/2016     Years since quitting: 3.4    Smokeless tobacco: Never Used   Substance Use Topics    Alcohol use: Yes     Alcohol/week: 0.0 standard drinks     Comment: rare- holiday    Drug use: No     Review of Systems   Constitutional: Negative for chills and fever.   HENT: Positive for rhinorrhea, sinus pain and sore throat. Negative for ear pain.         (+) Fluid in ear   Eyes: Negative for visual disturbance.   Respiratory: Positive for cough.    Cardiovascular: Negative for chest pain.   Gastrointestinal: Negative for abdominal pain, diarrhea, nausea and vomiting.   Genitourinary: Negative for dysuria.   Musculoskeletal: Negative for neck pain.   Skin: Negative for rash.   Allergic/Immunologic: Negative for immunocompromised state.   Neurological: Negative for headaches.   Psychiatric/Behavioral: Negative for dysphoric mood.       Physical Exam     Initial Vitals [01/18/20 1105]   BP Pulse Resp Temp SpO2   (!) 163/74 97 18 98.8 °F (37.1 °C) 95 %      MAP       --         Physical Exam    Nursing note and vitals reviewed.  Constitutional: She appears well-developed and well-nourished. She is not diaphoretic. She is active and cooperative.  Non-toxic appearance. She does not have a sickly appearance. She does not appear ill. No distress.   Very well-appearing, pleasant, afebrile, in no distress   HENT:   Head: Normocephalic and atraumatic.   Right Ear: Hearing, external ear and ear canal normal. No mastoid tenderness. Tympanic membrane is not injected, not perforated and not erythematous. A middle ear effusion is present.   Left Ear: Hearing, external ear and ear canal normal. No mastoid tenderness. Tympanic membrane is not injected, not perforated and not erythematous. A middle ear effusion is present.   Nose: Right sinus exhibits maxillary sinus tenderness. Right sinus exhibits no frontal sinus  tenderness. Left sinus exhibits no maxillary sinus tenderness and no frontal sinus tenderness.   Mouth/Throat: Uvula is midline, oropharynx is clear and moist and mucous membranes are normal. No trismus in the jaw. No dental abscesses or uvula swelling.   Very mild right maxillary sinus tenderness. Bilateral middle ear effusions without erythema or TM rupture.  Normal light reflex. Very mild oropharyngeal erythema without edema or exudate. No tonsillar enlargement.  Tonsils are symmetrical. Uvula is midline.  No trismus.  No sublingual swelling.  Mucous membranes are moist.   Eyes: Conjunctivae and EOM are normal. Pupils are equal, round, and reactive to light. Right eye exhibits no discharge. Left eye exhibits no discharge.   Neck: Normal range of motion. Neck supple. No thyromegaly present. No tracheal deviation present. No JVD present.   Cardiovascular: Normal rate and regular rhythm. Exam reveals no gallop.    No murmur heard.  Pulmonary/Chest: Effort normal and breath sounds normal. No stridor. No respiratory distress.   Abdominal: Soft. Bowel sounds are normal. She exhibits no distension. There is no tenderness.   Musculoskeletal: Normal range of motion. She exhibits no tenderness.   Neurological: She is alert and oriented to person, place, and time. She has normal strength. No cranial nerve deficit or sensory deficit. GCS eye subscore is 4. GCS verbal subscore is 5. GCS motor subscore is 6.   Skin: Skin is warm and dry. Capillary refill takes less than 2 seconds.   Psychiatric: She has a normal mood and affect. Her behavior is normal. Judgment and thought content normal.         ED Course   Procedures  Labs Reviewed   THROAT SCREEN, RAPID   CULTURE, STREP A,  THROAT   POCT INFLUENZA A/B MOLECULAR          Imaging Results          X-Ray Sinuses Min 3 Views (Final result)  Result time 01/18/20 11:49:45    Final result by Noemi Mckeon MD (01/18/20 11:49:45)                 Impression:      As  above.      Electronically signed by: Noemi Mckeon MD  Date:    01/18/2020  Time:    11:49             Narrative:    EXAMINATION:  XR SINUSES MIN 3 VIEWS    CLINICAL HISTORY:  Nasal congestion    TECHNIQUE:  AP, lateral, and Broderick views of the paranasal sinuses were performed    COMPARISON:  None.    FINDINGS:  The visualized paranasal sinuses including the mastoid air cells are grossly clear.  No air-fluid levels are identified.                                 Medical Decision Making:   History:   Old Medical Records: I decided to obtain old medical records.  Differential Diagnosis:   Sinus congestion, viral sinusitis, bacterial sinusitis, pharyngitis, influenza, viral syndrome, others  Clinical Tests:   Lab Tests: Ordered and Reviewed  Radiological Study: Ordered and Reviewed  ED Management:  HPI and physical exam as above.    The patient appears to have a viral upper respiratory infection.  There is very mild right maxillary sinus tenderness, however there is no significant tenderness in symptoms began only 2 days ago.  X-ray of the sinuses shows evidence of sinus disease.  Based upon the history and physical exam the patient does not appear to have a serious bacterial infection such as pneumonia, sepsis, otitis media, bacterial sinusitis, strep pharyngitis, parapharyngeal or peritonsillar abscess, meningitis.  Strep and influenza swabs were negative. Respiratory effort is normal with no signs of increased work of breathing or respiratory distress. Mucous membranes are moist and the patient is tolerating P.O. without difficulty.  Patient is afebrile, alert, active, and appears very well at this time and I have given specific return precautions to the patient and her daughter.  The patient can take over the counter medications and does not appear to need antibiotics at this time.  Will prescribe medications for symptomatic relief.    The results and physical exam findings were reviewed with the patient and her  daughter. Advised patient to follow up with her PCP for re-evaluation and further management.  Specific ED return precautions given. All questions regarding diagnosis and plan were answered to the patient's fullest possible satisfaction. Patient expressed understanding of diagnosis, discharge instructions, and return precautions.              Scribe Attestation:   Scribe #1: I performed the above scribed service and the documentation accurately describes the services I performed. I attest to the accuracy of the note.                          Clinical Impression:       ICD-10-CM ICD-9-CM   1. Viral syndrome B34.9 079.99   2. Sinus congestion R09.81 478.19         Disposition:   Disposition: Discharged  Condition: Stable    I, Eliezer Wang NP, personally performed the services described in this documentation. All medical record entries made by the scribe were at my direction and in my presence. I have reviewed the chart and agree that the record reflects my personal performance and is accurate and complete.                 Eliezer Wang NP  01/18/20 8214

## 2020-01-20 ENCOUNTER — PATIENT OUTREACH (OUTPATIENT)
Dept: ADMINISTRATIVE | Facility: HOSPITAL | Age: 65
End: 2020-01-20

## 2020-01-20 LAB — BACTERIA THROAT CULT: NORMAL

## 2020-01-28 ENCOUNTER — PATIENT OUTREACH (OUTPATIENT)
Dept: ADMINISTRATIVE | Facility: HOSPITAL | Age: 65
End: 2020-01-28

## 2020-02-03 ENCOUNTER — OFFICE VISIT (OUTPATIENT)
Dept: FAMILY MEDICINE | Facility: CLINIC | Age: 65
End: 2020-02-03
Payer: COMMERCIAL

## 2020-02-03 VITALS
DIASTOLIC BLOOD PRESSURE: 82 MMHG | BODY MASS INDEX: 34.37 KG/M2 | OXYGEN SATURATION: 96 % | WEIGHT: 186.75 LBS | HEART RATE: 79 BPM | RESPIRATION RATE: 17 BRPM | HEIGHT: 62 IN | SYSTOLIC BLOOD PRESSURE: 136 MMHG | TEMPERATURE: 98 F

## 2020-02-03 DIAGNOSIS — Z00.00 WELL WOMAN EXAM WITHOUT GYNECOLOGICAL EXAM: Primary | ICD-10-CM

## 2020-02-03 DIAGNOSIS — J44.9 CHRONIC OBSTRUCTIVE PULMONARY DISEASE, UNSPECIFIED COPD TYPE: ICD-10-CM

## 2020-02-03 DIAGNOSIS — Z85.3 HISTORY OF BREAST CANCER IN FEMALE: ICD-10-CM

## 2020-02-03 DIAGNOSIS — I10 ESSENTIAL HYPERTENSION: ICD-10-CM

## 2020-02-03 DIAGNOSIS — I42.8 NICM (NONISCHEMIC CARDIOMYOPATHY): ICD-10-CM

## 2020-02-03 DIAGNOSIS — R73.03 PREDIABETES: ICD-10-CM

## 2020-02-03 DIAGNOSIS — I44.7 LEFT BUNDLE-BRANCH BLOCK: ICD-10-CM

## 2020-02-03 DIAGNOSIS — Z95.810 CARDIAC RESYNCHRONIZATION THERAPY DEFIBRILLATOR (CRT-D) IN PLACE: ICD-10-CM

## 2020-02-03 PROCEDURE — 3079F PR MOST RECENT DIASTOLIC BLOOD PRESSURE 80-89 MM HG: ICD-10-PCS | Mod: CPTII,S$GLB,, | Performed by: FAMILY MEDICINE

## 2020-02-03 PROCEDURE — 99396 PREV VISIT EST AGE 40-64: CPT | Mod: S$GLB,,, | Performed by: FAMILY MEDICINE

## 2020-02-03 PROCEDURE — 99999 PR PBB SHADOW E&M-EST. PATIENT-LVL IV: CPT | Mod: PBBFAC,,, | Performed by: FAMILY MEDICINE

## 2020-02-03 PROCEDURE — 99999 PR PBB SHADOW E&M-EST. PATIENT-LVL IV: ICD-10-PCS | Mod: PBBFAC,,, | Performed by: FAMILY MEDICINE

## 2020-02-03 PROCEDURE — 99396 PR PREVENTIVE VISIT,EST,40-64: ICD-10-PCS | Mod: S$GLB,,, | Performed by: FAMILY MEDICINE

## 2020-02-03 PROCEDURE — 3075F SYST BP GE 130 - 139MM HG: CPT | Mod: CPTII,S$GLB,, | Performed by: FAMILY MEDICINE

## 2020-02-03 PROCEDURE — 3079F DIAST BP 80-89 MM HG: CPT | Mod: CPTII,S$GLB,, | Performed by: FAMILY MEDICINE

## 2020-02-03 PROCEDURE — 3075F PR MOST RECENT SYSTOLIC BLOOD PRESS GE 130-139MM HG: ICD-10-PCS | Mod: CPTII,S$GLB,, | Performed by: FAMILY MEDICINE

## 2020-02-03 NOTE — PROGRESS NOTES
"Well Woman VISIT      CHIEF COMPLAINT  Chief Complaint   Patient presents with    Annual Exam       HPI  Hannah Montoya is a 64 y.o. female who presents for physical.     Social Factors  Tobacco use: No  Ready to Quit: No  Alcohol: No  Intimate partner violence screening  "Do you feel safe in your current relationship?" Yes   "Have you ever been in a relationship in which your partner frightened you or hurt you?" No  Living Will/POA: No  Regular Exercise: No    Depression  Over the past two weeks, have you felt down, depressed, or hopeless? No  Over the past two weeks, have you felt little interest or pleasure in doing things? No    Reproductive Health  Followed by OBGYN    CHD, HTN, DM2  CHD Risk Factors: diabetes mellitus, hypertension, obesity (BMI >= 30 kg/m2) and smoking/ tobacco exposure  Women 45 years and older should be screened for dyslipidemia if at increased risk of CHD  Women 20 to 45 years of age should be screened for dyslipidemia if at increased risk of CHD  Asymptomatic adults with sustained blood pressure greater than 135/80 mm Hg (treated or untreated) should be screened for type 2 diabetes mellitus    Estimated body mass index is 34.14 kg/m² as calculated from the following:    Height as of this encounter: 5' 2.01" (1.575 m).    Weight as of this encounter: 84.7 kg (186 lb 11.7 oz).      Screening  Mammogram needed: ordered  Colonoscopy needed: due in March 2020  Osteoporosis screen needed: utd     Women 50 to 74 years of age should be screened for breast cancer with mammography biennially.  Women should be screened for cervical cancer with Pap tests beginning at 21 years of age. Low-risk women should receive Pap testing every three years. Co-testing for human papillomavirus is an option beginning at 30 years of age, and can extend the screening interval to five years. Cervical cancer screening should be discontinued at 65 years of age or after total hysterectomy if the woman has a " "benign gynecologic history  Adults 50 to 75 years of age should be screened for colorectal cancer with an FOBT annually, sigmoidoscopy every five years with an FOBT every three years, or colonoscopy every 10 years.  Women 65 years and older should be screened for osteoporosis. Women younger than 65 years should be screened if the risk of fracture is greater than or equal to that of a 65-year-old white woman without additional risk factors.      Immunizations  delayed    ALLERGIES and MEDS were verified.   PMHx, PSHx, FHx, SOCIALHx were updated as pertinent.    REVIEW OF SYSTEMS  Review of Systems   Constitutional: Negative.    HENT: Negative.    Eyes: Negative for discharge.   Respiratory: Negative for wheezing.    Cardiovascular: Negative for chest pain and palpitations.   Gastrointestinal: Negative for blood in stool, constipation, diarrhea and vomiting.   Genitourinary: Negative for hematuria.   Musculoskeletal: Negative.    Skin: Negative.    Neurological: Negative for headaches.   Endo/Heme/Allergies: Negative for polydipsia.         PHYSICAL EXAM  VITAL SIGNS: /82 (BP Location: Left arm, Patient Position: Sitting, BP Method: Large (Manual))   Pulse 79   Temp 98 °F (36.7 °C) (Oral)   Resp 17   Ht 5' 2.01" (1.575 m)   Wt 84.7 kg (186 lb 11.7 oz)   LMP  (LMP Unknown)   SpO2 96%   BMI 34.14 kg/m²   GEN: Well developed, Well nourished, No acute distress.  HENT: Normocephalic, Atraumatic, Bilateral external ears normal, Nose normal, Oropharynx moist, No oral exudates.   Eyes: PERRL, EOMI, Conjunctiva normal, No discharge.   Neck: Supple, No tenderness.  Lymphatic: No cervical or supraclavicular lymphadenopathy noted.   Cardiovascular: Normal heart rate, Normal rhythm, No murmurs, No rubs, No gallops.   Thorax & Lungs: Normal breath sounds, No respiratory distress, No wheezing.  Abdomen: Soft, No tenderness, Bowel sounds normal.  Breast:deferred  Genital: deferreed   Skin: Warm, Dry, No erythema, No " rash.   Extremities: No edema, No tenderness.       ASSESSMENT/PLAN    Hannah was seen today for annual exam.    Diagnoses and all orders for this visit:    Well woman exam without gynecological exam  -     CBC auto differential; Future  -     Comprehensive metabolic panel; Future  -     Lipid panel; Future  -     Hemoglobin A1c; Future  -     Urinalysis; Future  -     Microalbumin/creatinine urine ratio; Future  -     Mammo Digital Screening Bilateral With CAD; Future    Chronic obstructive pulmonary disease, unspecified COPD type    Essential hypertension    Left bundle-branch block    NICM (nonischemic cardiomyopathy)    Cardiac resynchronization therapy defibrillator (CRT-D) in place    History of breast cancer in female    Prediabetes  -     Hemoglobin A1c; Future  -     Microalbumin/creatinine urine ratio; Future           FOLLOW UP: 6 months or sooner if needed  Follow up with cardiology as scheduled      Emanuel Chavez MD

## 2020-02-06 DIAGNOSIS — E11.9 TYPE 2 DIABETES MELLITUS WITHOUT COMPLICATION, WITHOUT LONG-TERM CURRENT USE OF INSULIN: ICD-10-CM

## 2020-02-06 DIAGNOSIS — J44.1 COPD WITH ACUTE EXACERBATION: ICD-10-CM

## 2020-02-06 RX ORDER — ATORVASTATIN CALCIUM 10 MG/1
10 TABLET, FILM COATED ORAL DAILY
Qty: 90 TABLET | Refills: 3 | Status: SHIPPED | OUTPATIENT
Start: 2020-02-06 | End: 2020-08-03 | Stop reason: SDUPTHER

## 2020-02-07 RX ORDER — FLUTICASONE PROPIONATE AND SALMETEROL 250; 50 UG/1; UG/1
POWDER RESPIRATORY (INHALATION)
Qty: 60 EACH | Refills: 0 | Status: SHIPPED | OUTPATIENT
Start: 2020-02-07 | End: 2020-04-17 | Stop reason: SDUPTHER

## 2020-02-21 RX ORDER — METFORMIN HYDROCHLORIDE 500 MG/1
TABLET ORAL
Qty: 60 TABLET | Refills: 0 | Status: SHIPPED | OUTPATIENT
Start: 2020-02-21 | End: 2020-03-15

## 2020-03-05 ENCOUNTER — PATIENT MESSAGE (OUTPATIENT)
Dept: FAMILY MEDICINE | Facility: CLINIC | Age: 65
End: 2020-03-05

## 2020-03-06 DIAGNOSIS — J41.0 SIMPLE CHRONIC BRONCHITIS: ICD-10-CM

## 2020-03-06 DIAGNOSIS — J44.1 COPD WITH ACUTE EXACERBATION: ICD-10-CM

## 2020-03-06 RX ORDER — ALBUTEROL SULFATE 90 UG/1
2 AEROSOL, METERED RESPIRATORY (INHALATION) EVERY 6 HOURS PRN
Qty: 17 G | Refills: 5 | Status: CANCELLED | OUTPATIENT
Start: 2020-03-06

## 2020-03-11 ENCOUNTER — HOSPITAL ENCOUNTER (INPATIENT)
Facility: HOSPITAL | Age: 65
LOS: 1 days | Discharge: HOME | DRG: 194 | End: 2020-03-12
Attending: EMERGENCY MEDICINE | Admitting: FAMILY MEDICINE
Payer: COMMERCIAL

## 2020-03-11 DIAGNOSIS — J18.9 PNEUMONIA OF BOTH LUNGS DUE TO INFECTIOUS ORGANISM, UNSPECIFIED PART OF LUNG: ICD-10-CM

## 2020-03-11 DIAGNOSIS — R06.02 SOB (SHORTNESS OF BREATH): ICD-10-CM

## 2020-03-11 DIAGNOSIS — R91.1 PULMONARY NODULE: ICD-10-CM

## 2020-03-11 DIAGNOSIS — J44.9 CHRONIC OBSTRUCTIVE PULMONARY DISEASE, UNSPECIFIED COPD TYPE: ICD-10-CM

## 2020-03-11 DIAGNOSIS — J44.1 COPD WITH ACUTE EXACERBATION: ICD-10-CM

## 2020-03-11 DIAGNOSIS — R09.02 HYPOXIA: Primary | ICD-10-CM

## 2020-03-11 DIAGNOSIS — J44.1 COPD EXACERBATION: ICD-10-CM

## 2020-03-11 DIAGNOSIS — R91.8 LUNG MASS: ICD-10-CM

## 2020-03-11 LAB
ALBUMIN SERPL BCP-MCNC: 3.8 G/DL (ref 3.5–5.2)
ALP SERPL-CCNC: 60 U/L (ref 55–135)
ALT SERPL W/O P-5'-P-CCNC: 20 U/L (ref 10–44)
ANION GAP SERPL CALC-SCNC: 13 MMOL/L (ref 8–16)
AST SERPL-CCNC: 16 U/L (ref 10–40)
BACTERIA #/AREA URNS HPF: NORMAL /HPF
BASOPHILS # BLD AUTO: 0.06 K/UL (ref 0–0.2)
BASOPHILS NFR BLD: 0.5 % (ref 0–1.9)
BILIRUB SERPL-MCNC: 0.7 MG/DL (ref 0.1–1)
BILIRUB UR QL STRIP: NEGATIVE
BNP SERPL-MCNC: 17 PG/ML (ref 0–99)
BUN SERPL-MCNC: 10 MG/DL (ref 8–23)
CALCIUM SERPL-MCNC: 10.3 MG/DL (ref 8.7–10.5)
CHLORIDE SERPL-SCNC: 97 MMOL/L (ref 95–110)
CLARITY UR: CLEAR
CO2 SERPL-SCNC: 30 MMOL/L (ref 23–29)
COLOR UR: YELLOW
CREAT SERPL-MCNC: 0.9 MG/DL (ref 0.5–1.4)
CTP QC/QA: YES
DIFFERENTIAL METHOD: ABNORMAL
EOSINOPHIL # BLD AUTO: 0.1 K/UL (ref 0–0.5)
EOSINOPHIL NFR BLD: 0.8 % (ref 0–8)
ERYTHROCYTE [DISTWIDTH] IN BLOOD BY AUTOMATED COUNT: 14.3 % (ref 11.5–14.5)
EST. GFR  (AFRICAN AMERICAN): >60 ML/MIN/1.73 M^2
EST. GFR  (NON AFRICAN AMERICAN): >60 ML/MIN/1.73 M^2
GLUCOSE SERPL-MCNC: 127 MG/DL (ref 70–110)
GLUCOSE UR QL STRIP: NEGATIVE
HCT VFR BLD AUTO: 39.6 % (ref 37–48.5)
HGB BLD-MCNC: 12.6 G/DL (ref 12–16)
HGB UR QL STRIP: NEGATIVE
HYALINE CASTS #/AREA URNS LPF: 0 /LPF
IMM GRANULOCYTES # BLD AUTO: 0.11 K/UL (ref 0–0.04)
IMM GRANULOCYTES NFR BLD AUTO: 1 % (ref 0–0.5)
KETONES UR QL STRIP: NEGATIVE
LEUKOCYTE ESTERASE UR QL STRIP: NEGATIVE
LIPASE SERPL-CCNC: 163 U/L (ref 4–60)
LYMPHOCYTES # BLD AUTO: 1.8 K/UL (ref 1–4.8)
LYMPHOCYTES NFR BLD: 15.5 % (ref 18–48)
MCH RBC QN AUTO: 28.6 PG (ref 27–31)
MCHC RBC AUTO-ENTMCNC: 31.8 G/DL (ref 32–36)
MCV RBC AUTO: 90 FL (ref 82–98)
MICROSCOPIC COMMENT: NORMAL
MONOCYTES # BLD AUTO: 1.1 K/UL (ref 0.3–1)
MONOCYTES NFR BLD: 9.2 % (ref 4–15)
NEUTROPHILS # BLD AUTO: 8.4 K/UL (ref 1.8–7.7)
NEUTROPHILS NFR BLD: 73 % (ref 38–73)
NITRITE UR QL STRIP: NEGATIVE
NRBC BLD-RTO: 0 /100 WBC
PH UR STRIP: 6 [PH] (ref 5–8)
PLATELET # BLD AUTO: 202 K/UL (ref 150–350)
PMV BLD AUTO: 12 FL (ref 9.2–12.9)
POC MOLECULAR INFLUENZA A AGN: NEGATIVE
POC MOLECULAR INFLUENZA B AGN: NEGATIVE
POTASSIUM SERPL-SCNC: 3.7 MMOL/L (ref 3.5–5.1)
PROT SERPL-MCNC: 7.9 G/DL (ref 6–8.4)
PROT UR QL STRIP: ABNORMAL
RBC # BLD AUTO: 4.41 M/UL (ref 4–5.4)
RBC #/AREA URNS HPF: 2 /HPF (ref 0–4)
SODIUM SERPL-SCNC: 140 MMOL/L (ref 136–145)
SP GR UR STRIP: 1.02 (ref 1–1.03)
SQUAMOUS #/AREA URNS HPF: 3 /HPF
TROPONIN I SERPL DL<=0.01 NG/ML-MCNC: <0.006 NG/ML (ref 0–0.03)
URN SPEC COLLECT METH UR: ABNORMAL
UROBILINOGEN UR STRIP-ACNC: NEGATIVE EU/DL
WBC # BLD AUTO: 11.51 K/UL (ref 3.9–12.7)
WBC #/AREA URNS HPF: 1 /HPF (ref 0–5)

## 2020-03-11 PROCEDURE — 83880 ASSAY OF NATRIURETIC PEPTIDE: CPT

## 2020-03-11 PROCEDURE — 85025 COMPLETE CBC W/AUTO DIFF WBC: CPT

## 2020-03-11 PROCEDURE — 25500020 PHARM REV CODE 255: Performed by: EMERGENCY MEDICINE

## 2020-03-11 PROCEDURE — 81000 URINALYSIS NONAUTO W/SCOPE: CPT

## 2020-03-11 PROCEDURE — 96375 TX/PRO/DX INJ NEW DRUG ADDON: CPT

## 2020-03-11 PROCEDURE — 94640 AIRWAY INHALATION TREATMENT: CPT

## 2020-03-11 PROCEDURE — 99285 EMERGENCY DEPT VISIT HI MDM: CPT | Mod: 25

## 2020-03-11 PROCEDURE — 94761 N-INVAS EAR/PLS OXIMETRY MLT: CPT

## 2020-03-11 PROCEDURE — 96367 TX/PROPH/DG ADDL SEQ IV INF: CPT

## 2020-03-11 PROCEDURE — 84484 ASSAY OF TROPONIN QUANT: CPT

## 2020-03-11 PROCEDURE — 25000242 PHARM REV CODE 250 ALT 637 W/ HCPCS: Performed by: EMERGENCY MEDICINE

## 2020-03-11 PROCEDURE — 80053 COMPREHEN METABOLIC PANEL: CPT

## 2020-03-11 PROCEDURE — 87040 BLOOD CULTURE FOR BACTERIA: CPT | Mod: 59

## 2020-03-11 PROCEDURE — 87502 INFLUENZA DNA AMP PROBE: CPT

## 2020-03-11 PROCEDURE — 63600175 PHARM REV CODE 636 W HCPCS: Performed by: EMERGENCY MEDICINE

## 2020-03-11 PROCEDURE — 83690 ASSAY OF LIPASE: CPT

## 2020-03-11 RX ORDER — METHYLPREDNISOLONE SOD SUCC 125 MG
125 VIAL (EA) INJECTION
Status: COMPLETED | OUTPATIENT
Start: 2020-03-11 | End: 2020-03-11

## 2020-03-11 RX ORDER — IPRATROPIUM BROMIDE AND ALBUTEROL SULFATE 2.5; .5 MG/3ML; MG/3ML
3 SOLUTION RESPIRATORY (INHALATION)
Status: COMPLETED | OUTPATIENT
Start: 2020-03-11 | End: 2020-03-11

## 2020-03-11 RX ADMIN — IPRATROPIUM BROMIDE AND ALBUTEROL SULFATE 3 ML: .5; 3 SOLUTION RESPIRATORY (INHALATION) at 11:03

## 2020-03-11 RX ADMIN — METHYLPREDNISOLONE SODIUM SUCCINATE 125 MG: 125 INJECTION, POWDER, FOR SOLUTION INTRAMUSCULAR; INTRAVENOUS at 09:03

## 2020-03-11 RX ADMIN — IPRATROPIUM BROMIDE AND ALBUTEROL SULFATE 3 ML: .5; 3 SOLUTION RESPIRATORY (INHALATION) at 09:03

## 2020-03-11 RX ADMIN — IOHEXOL 75 ML: 350 INJECTION, SOLUTION INTRAVENOUS at 09:03

## 2020-03-12 ENCOUNTER — PATIENT MESSAGE (OUTPATIENT)
Dept: FAMILY MEDICINE | Facility: CLINIC | Age: 65
End: 2020-03-12

## 2020-03-12 VITALS
DIASTOLIC BLOOD PRESSURE: 74 MMHG | WEIGHT: 180 LBS | HEART RATE: 85 BPM | RESPIRATION RATE: 18 BRPM | SYSTOLIC BLOOD PRESSURE: 138 MMHG | BODY MASS INDEX: 33.13 KG/M2 | OXYGEN SATURATION: 93 % | TEMPERATURE: 98 F | HEIGHT: 62 IN

## 2020-03-12 PROBLEM — R06.03 RESPIRATORY DISTRESS: Status: ACTIVE | Noted: 2020-03-12

## 2020-03-12 PROBLEM — R91.8 LUNG MASS: Status: ACTIVE | Noted: 2020-03-12

## 2020-03-12 PROBLEM — R09.02 HYPOXIA: Status: ACTIVE | Noted: 2020-03-12

## 2020-03-12 PROBLEM — J18.9 PNEUMONIA DUE TO INFECTIOUS ORGANISM: Status: ACTIVE | Noted: 2020-03-12

## 2020-03-12 PROBLEM — J44.1 COPD EXACERBATION: Status: ACTIVE | Noted: 2020-03-12

## 2020-03-12 PROBLEM — R06.03 RESPIRATORY DISTRESS: Status: RESOLVED | Noted: 2020-03-12 | Resolved: 2020-03-12

## 2020-03-12 LAB
ADENOVIRUS: NOT DETECTED
ALBUMIN SERPL BCP-MCNC: 3.4 G/DL (ref 3.5–5.2)
ALP SERPL-CCNC: 56 U/L (ref 55–135)
ALT SERPL W/O P-5'-P-CCNC: 21 U/L (ref 10–44)
ANION GAP SERPL CALC-SCNC: 14 MMOL/L (ref 8–16)
AST SERPL-CCNC: 26 U/L (ref 10–40)
BASOPHILS # BLD AUTO: 0.02 K/UL (ref 0–0.2)
BASOPHILS NFR BLD: 0.2 % (ref 0–1.9)
BILIRUB SERPL-MCNC: 0.4 MG/DL (ref 0.1–1)
BORDETELLA PARAPERTUSSIS (IS1001): NOT DETECTED
BORDETELLA PERTUSSIS (PTXP): NOT DETECTED
BUN SERPL-MCNC: 12 MG/DL (ref 8–23)
CALCIUM SERPL-MCNC: 9.9 MG/DL (ref 8.7–10.5)
CHLAMYDIA PNEUMONIAE: NOT DETECTED
CHLORIDE SERPL-SCNC: 97 MMOL/L (ref 95–110)
CO2 SERPL-SCNC: 24 MMOL/L (ref 23–29)
CORONAVIRUS 229E, COMMON COLD VIRUS: NOT DETECTED
CORONAVIRUS HKU1, COMMON COLD VIRUS: NOT DETECTED
CORONAVIRUS NL63, COMMON COLD VIRUS: NOT DETECTED
CORONAVIRUS OC43, COMMON COLD VIRUS: NOT DETECTED
CREAT SERPL-MCNC: 1.1 MG/DL (ref 0.5–1.4)
DIFFERENTIAL METHOD: ABNORMAL
EOSINOPHIL # BLD AUTO: 0 K/UL (ref 0–0.5)
EOSINOPHIL NFR BLD: 0 % (ref 0–8)
ERYTHROCYTE [DISTWIDTH] IN BLOOD BY AUTOMATED COUNT: 14.4 % (ref 11.5–14.5)
EST. GFR  (AFRICAN AMERICAN): >60 ML/MIN/1.73 M^2
EST. GFR  (NON AFRICAN AMERICAN): 54 ML/MIN/1.73 M^2
FLUBV RNA NPH QL NAA+NON-PROBE: NOT DETECTED
GLUCOSE SERPL-MCNC: 286 MG/DL (ref 70–110)
GLUCOSE SERPL-MCNC: 295 MG/DL (ref 70–110)
HCT VFR BLD AUTO: 37.1 % (ref 37–48.5)
HGB BLD-MCNC: 12.2 G/DL (ref 12–16)
HPIV1 RNA NPH QL NAA+NON-PROBE: NOT DETECTED
HPIV2 RNA NPH QL NAA+NON-PROBE: NOT DETECTED
HPIV3 RNA NPH QL NAA+NON-PROBE: NOT DETECTED
HPIV4 RNA NPH QL NAA+NON-PROBE: NOT DETECTED
HUMAN METAPNEUMOVIRUS: NOT DETECTED
IMM GRANULOCYTES # BLD AUTO: 0.1 K/UL (ref 0–0.04)
IMM GRANULOCYTES NFR BLD AUTO: 1.1 % (ref 0–0.5)
INFLUENZA A (SUBTYPES H1,H1-2009,H3): NOT DETECTED
LYMPHOCYTES # BLD AUTO: 0.7 K/UL (ref 1–4.8)
LYMPHOCYTES NFR BLD: 7.4 % (ref 18–48)
MCH RBC QN AUTO: 28.8 PG (ref 27–31)
MCHC RBC AUTO-ENTMCNC: 32.9 G/DL (ref 32–36)
MCV RBC AUTO: 88 FL (ref 82–98)
MONOCYTES # BLD AUTO: 0.2 K/UL (ref 0.3–1)
MONOCYTES NFR BLD: 2.1 % (ref 4–15)
MYCOPLASMA PNEUMONIAE: NOT DETECTED
NEUTROPHILS # BLD AUTO: 8.5 K/UL (ref 1.8–7.7)
NEUTROPHILS NFR BLD: 89.2 % (ref 38–73)
NRBC BLD-RTO: 0 /100 WBC
PLATELET # BLD AUTO: 184 K/UL (ref 150–350)
PMV BLD AUTO: 12.5 FL (ref 9.2–12.9)
POCT GLUCOSE: 285 MG/DL (ref 70–110)
POCT GLUCOSE: 295 MG/DL (ref 70–110)
POTASSIUM SERPL-SCNC: 4.4 MMOL/L (ref 3.5–5.1)
PROT SERPL-MCNC: 7.8 G/DL (ref 6–8.4)
RBC # BLD AUTO: 4.23 M/UL (ref 4–5.4)
RESPIRATORY INFECTION PANEL SOURCE: ABNORMAL
RSV RNA NPH QL NAA+NON-PROBE: NOT DETECTED
RV+EV RNA NPH QL NAA+NON-PROBE: DETECTED
SODIUM SERPL-SCNC: 135 MMOL/L (ref 136–145)
WBC # BLD AUTO: 9.5 K/UL (ref 3.9–12.7)

## 2020-03-12 PROCEDURE — 96365 THER/PROPH/DIAG IV INF INIT: CPT

## 2020-03-12 PROCEDURE — 25000242 PHARM REV CODE 250 ALT 637 W/ HCPCS: Performed by: EMERGENCY MEDICINE

## 2020-03-12 PROCEDURE — 12000002 HC ACUTE/MED SURGE SEMI-PRIVATE ROOM

## 2020-03-12 PROCEDURE — 99223 1ST HOSP IP/OBS HIGH 75: CPT | Mod: ,,, | Performed by: INTERNAL MEDICINE

## 2020-03-12 PROCEDURE — 80053 COMPREHEN METABOLIC PANEL: CPT

## 2020-03-12 PROCEDURE — 25000003 PHARM REV CODE 250: Performed by: EMERGENCY MEDICINE

## 2020-03-12 PROCEDURE — 85025 COMPLETE CBC W/AUTO DIFF WBC: CPT

## 2020-03-12 PROCEDURE — 87633 RESP VIRUS 12-25 TARGETS: CPT

## 2020-03-12 PROCEDURE — 99223 PR INITIAL HOSPITAL CARE,LEVL III: ICD-10-PCS | Mod: ,,, | Performed by: INTERNAL MEDICINE

## 2020-03-12 PROCEDURE — 63600175 PHARM REV CODE 636 W HCPCS: Performed by: EMERGENCY MEDICINE

## 2020-03-12 RX ORDER — FUROSEMIDE 40 MG/1
40 TABLET ORAL 2 TIMES DAILY
Status: DISCONTINUED | OUTPATIENT
Start: 2020-03-12 | End: 2020-03-12 | Stop reason: HOSPADM

## 2020-03-12 RX ORDER — ENOXAPARIN SODIUM 100 MG/ML
40 INJECTION SUBCUTANEOUS EVERY 24 HOURS
Status: DISCONTINUED | OUTPATIENT
Start: 2020-03-12 | End: 2020-03-12 | Stop reason: HOSPADM

## 2020-03-12 RX ORDER — AMOXICILLIN AND CLAVULANATE POTASSIUM 875; 125 MG/1; MG/1
1 TABLET, FILM COATED ORAL EVERY 12 HOURS
Qty: 14 TABLET | Refills: 0 | Status: SHIPPED | OUTPATIENT
Start: 2020-03-12 | End: 2020-03-19

## 2020-03-12 RX ORDER — IBUPROFEN 200 MG
24 TABLET ORAL
Status: DISCONTINUED | OUTPATIENT
Start: 2020-03-12 | End: 2020-03-12 | Stop reason: HOSPADM

## 2020-03-12 RX ORDER — INSULIN ASPART 100 [IU]/ML
1-10 INJECTION, SOLUTION INTRAVENOUS; SUBCUTANEOUS
Status: DISCONTINUED | OUTPATIENT
Start: 2020-03-12 | End: 2020-03-12 | Stop reason: HOSPADM

## 2020-03-12 RX ORDER — CARVEDILOL 12.5 MG/1
25 TABLET ORAL 2 TIMES DAILY WITH MEALS
Status: DISCONTINUED | OUTPATIENT
Start: 2020-03-12 | End: 2020-03-12 | Stop reason: HOSPADM

## 2020-03-12 RX ORDER — IPRATROPIUM BROMIDE AND ALBUTEROL SULFATE 2.5; .5 MG/3ML; MG/3ML
3 SOLUTION RESPIRATORY (INHALATION) EVERY 4 HOURS
Status: DISCONTINUED | OUTPATIENT
Start: 2020-03-12 | End: 2020-03-12 | Stop reason: HOSPADM

## 2020-03-12 RX ORDER — ATORVASTATIN CALCIUM 10 MG/1
10 TABLET, FILM COATED ORAL DAILY
Status: DISCONTINUED | OUTPATIENT
Start: 2020-03-12 | End: 2020-03-12

## 2020-03-12 RX ORDER — GLUCAGON 1 MG
1 KIT INJECTION
Status: DISCONTINUED | OUTPATIENT
Start: 2020-03-12 | End: 2020-03-12 | Stop reason: HOSPADM

## 2020-03-12 RX ORDER — SERTRALINE HYDROCHLORIDE 50 MG/1
100 TABLET, FILM COATED ORAL EVERY MORNING
Status: DISCONTINUED | OUTPATIENT
Start: 2020-03-12 | End: 2020-03-12 | Stop reason: HOSPADM

## 2020-03-12 RX ORDER — AZITHROMYCIN 250 MG/1
250 TABLET, FILM COATED ORAL DAILY
Status: DISCONTINUED | OUTPATIENT
Start: 2020-03-13 | End: 2020-03-12 | Stop reason: HOSPADM

## 2020-03-12 RX ORDER — ONDANSETRON 2 MG/ML
4 INJECTION INTRAMUSCULAR; INTRAVENOUS EVERY 6 HOURS PRN
Status: CANCELLED | OUTPATIENT
Start: 2020-03-12

## 2020-03-12 RX ORDER — IBUPROFEN 200 MG
16 TABLET ORAL
Status: DISCONTINUED | OUTPATIENT
Start: 2020-03-12 | End: 2020-03-12 | Stop reason: HOSPADM

## 2020-03-12 RX ORDER — PREDNISONE 20 MG/1
20 TABLET ORAL DAILY
Qty: 15 TABLET | Refills: 0
Start: 2020-03-12 | End: 2020-03-14

## 2020-03-12 RX ORDER — SODIUM CHLORIDE 0.9 % (FLUSH) 0.9 %
10 SYRINGE (ML) INJECTION
Status: DISCONTINUED | OUTPATIENT
Start: 2020-03-12 | End: 2020-03-12 | Stop reason: HOSPADM

## 2020-03-12 RX ORDER — ATORVASTATIN CALCIUM 10 MG/1
10 TABLET, FILM COATED ORAL DAILY
Status: DISCONTINUED | OUTPATIENT
Start: 2020-03-12 | End: 2020-03-12 | Stop reason: HOSPADM

## 2020-03-12 RX ORDER — FLUTICASONE PROPIONATE 50 MCG
1 SPRAY, SUSPENSION (ML) NASAL DAILY
Status: DISCONTINUED | OUTPATIENT
Start: 2020-03-12 | End: 2020-03-12 | Stop reason: HOSPADM

## 2020-03-12 RX ORDER — PREDNISONE 20 MG/1
60 TABLET ORAL DAILY
Status: DISCONTINUED | OUTPATIENT
Start: 2020-03-12 | End: 2020-03-12 | Stop reason: HOSPADM

## 2020-03-12 RX ORDER — ENOXAPARIN SODIUM 100 MG/ML
40 INJECTION SUBCUTANEOUS EVERY 24 HOURS
Status: CANCELLED | OUTPATIENT
Start: 2020-03-12

## 2020-03-12 RX ORDER — CETIRIZINE HYDROCHLORIDE 10 MG/1
10 TABLET ORAL NIGHTLY
Status: DISCONTINUED | OUTPATIENT
Start: 2020-03-12 | End: 2020-03-12 | Stop reason: HOSPADM

## 2020-03-12 RX ADMIN — PREDNISONE 60 MG: 20 TABLET ORAL at 10:03

## 2020-03-12 RX ADMIN — CARVEDILOL 25 MG: 12.5 TABLET, FILM COATED ORAL at 08:03

## 2020-03-12 RX ADMIN — FLUTICASONE PROPIONATE 50 MCG: 50 SPRAY, METERED NASAL at 10:03

## 2020-03-12 RX ADMIN — FUROSEMIDE 40 MG: 40 TABLET ORAL at 10:03

## 2020-03-12 RX ADMIN — CEFTRIAXONE 1 G: 1 INJECTION, SOLUTION INTRAVENOUS at 12:03

## 2020-03-12 RX ADMIN — SERTRALINE HYDROCHLORIDE 100 MG: 50 TABLET ORAL at 08:03

## 2020-03-12 RX ADMIN — INSULIN ASPART 6 UNITS: 100 INJECTION, SOLUTION INTRAVENOUS; SUBCUTANEOUS at 12:03

## 2020-03-12 RX ADMIN — AZITHROMYCIN MONOHYDRATE 500 MG: 500 INJECTION, POWDER, LYOPHILIZED, FOR SOLUTION INTRAVENOUS at 12:03

## 2020-03-12 RX ADMIN — IPRATROPIUM BROMIDE AND ALBUTEROL SULFATE 3 ML: .5; 3 SOLUTION RESPIRATORY (INHALATION) at 01:03

## 2020-03-12 NOTE — ASSESSMENT & PLAN NOTE
2/2 PNA and COPD exacerbation.  Lung mass also noted on CTA chest.  Given pt's risk and symptoms respiratory panel sent and health dept notified.  If resp panel neg, health dept rec'd COVID19 testing per ED physician.  For now continue CAP coverage, jane, +/- steroids.  If wheezing is mild today and air entry into lungs is acceptable I would DC steroids or give a very very short course given immunosuppression effects of steroids.      Also will consult pulmonology and oncology for lung mass.  Of note pt does have a h/o prior breast cancer.    CTA chest:   1. Right lower lobe 3.2 cm soft tissue mass concerning for malignancy.  Further evaluation is advised with PET/CT and/or biopsy.  2. Right lower lobe 2 cm posterior pleural based nodule versus focal consolidation.  3. Consolidation seen within the medial aspect of the right upper lobe which may reflect atelectasis and/or pneumonia.  4. Multifocal patchy opacities seen within the left lower lobe.  This is nonspecific but may reflect multifocal infectious or inflammatory process.  No confluent consolidation seen in this region.  Some of these opacities demonstrates somewhat nodular configuration.

## 2020-03-12 NOTE — SUBJECTIVE & OBJECTIVE
Past Medical History:   Diagnosis Date    Asthma     bronchitis in past    Breast cancer 2016    right    Cardiac pacemaker     Cardiomyopathy     COPD (chronic obstructive pulmonary disease)     Diabetes mellitus, type 2     Hyperglycemia     Hyperlipidemia     Hypertension     Malignant neoplasm of overlapping sites of female breast 2016    Respiratory distress 3/12/2020       Past Surgical History:   Procedure Laterality Date    BRE[facility] BIOPSY Right 2016    [facility]    BREAST LUMPECTOMY Right      SECTION      x2    CHOLECYSTECTOMY      REVISION OF IMPLANTABLE CARDIOVERTER-DEFIBRILLATOR (ICD) ELECTRODE LEAD PLACEMENT N/A 6/15/2018    Procedure: REVISION-LEAD-ICD;  Surgeon: Javy Gates MD;  [facility];  Service: Cardiology;  Laterality: N/A;  LBBB, Bi-V ICD HIS Ld rev, MDT, Choice, MB, 3 Prep    TUBAL LIGATION         Review of patient's allergies indicates:   Allergen Reactions    Adhesive Rash       No current facility-administered medications on file prior to encounter.      Current Outpatient Medications on File Prior to Encounter   Medication Sig    albuterol sulfate (PROAIR RESPICLICK) 90 mcg/actuation AePB Inhale 2 puffs into the lungs every 4 (four) hours as needed. Rescue    albuterol-ipratropium (DUO-NEB) 2.5 mg-0.5 mg/3 mL nebulizer solution Use 1 vial IN NEBULIZER EVERY 6 HOURS AS NEEDED FOR FOR WHEEZING    atorvastatin (LIPITOR) 10 MG tablet Take 1 tablet (10 mg total) by mouth once daily.    carvedilol (COREG) 25 MG tablet TAKE 1 TABLET(25 MG) BY MOUTH TWICE DAILY    fluticasone-salmeterol diskus inhaler 250-50 mcg INHALE 1 PUFF BY MOUTH TWICE DAILY    furosemide (LASIX) 40 MG tablet TAKE 1 TABLET(40 MG) BY MOUTH TWICE DAILY    metFORMIN (GLUCOPHAGE) 500 MG tablet TAKE ONE TABLET BY MOUTH TWICE DAILY    multivitamin (ONE DAILY MULTIVITAMIN) per tablet Take 1 tablet by mouth once daily.    sertraline (ZOLOFT) 100 MG tablet TAKE ONE TABLET BY  MOUTH EVERY MORNING    cetirizine (ZYRTEC) 10 MG tablet Take 10 mg by mouth every evening.     doxycycline (VIBRA-TABS) 100 MG tablet Take 1 tablet (100 mg total) by mouth every 12 (twelve) hours. (Patient not taking: Reported on 10/1/2019)    fluticasone propionate (FLONASE) 50 mcg/actuation nasal spray 1 spray (50 mcg total) by Each Nare route once daily.    fluticasone propionate (FLONASE) 50 mcg/actuation nasal spray 1 spray (50 mcg total) by Each Nostril route 2 (two) times daily as needed for Rhinitis.    ibuprofen (ADVIL,MOTRIN) 800 MG tablet Take 1 tablet (800 mg total) by mouth every 8 (eight) hours as needed for Pain. (Patient not taking: Reported on 10/1/2019)    lisinopril 10 MG tablet Take 1 tablet (10 mg total) by mouth once daily. (Patient taking differently: Take 10 mg by mouth as needed. )    predniSONE (DELTASONE) 20 MG tablet Take 3 tablets (60 mg total) by mouth once daily.    TRUETEST TEST STRIPS Strp test once EVERY MORNING     Family History     Problem Relation (Age of Onset)    Clotting disorder Brother    Kidney disease Mother, Father        Tobacco Use    Smoking status: Former Smoker     Packs/day: 0.50     Years: 40.00     Pack years: 20.00     Types: Cigarettes     Last attempt to quit: 8/1/2016     Years since quitting: 3.6    Smokeless tobacco: Never Used   Substance and Sexual Activity    Alcohol use: Yes     Comment: rare- holiday    Drug use: No    Sexual activity: Not on file     Review of Systems   Constitutional: Negative for diaphoresis and fever.   HENT: Negative for facial swelling and nosebleeds.    Eyes: Negative for pain and visual disturbance.   Respiratory: Positive for cough, shortness of breath and wheezing. Negative for chest tightness.    Cardiovascular: Negative for chest pain and palpitations.   Gastrointestinal: Negative for diarrhea and nausea.   Endocrine: Negative for cold intolerance and heat intolerance.   Genitourinary: Negative for hematuria  and urgency.   Musculoskeletal: Negative for arthralgias and back pain.   Skin: Negative for pallor and rash.   Neurological: Negative for syncope and speech difficulty.   Hematological: Negative for adenopathy. Does not bruise/bleed easily.   Psychiatric/Behavioral: Negative for confusion and hallucinations.     Objective:     Vital Signs (Most Recent):  Temp: 98.3 °F (36.8 °C) (03/12/20 0240)  Pulse: 74 (03/12/20 0423)  Resp: 20 (03/12/20 0401)  BP: 134/67 (03/12/20 0401)  SpO2: 95 % (03/12/20 0423) Vital Signs (24h Range):  Temp:  [98.3 °F (36.8 °C)-99.1 °F (37.3 °C)] 98.3 °F (36.8 °C)  Pulse:  [74-90] 74  Resp:  [14-30] 20  SpO2:  [86 %-99 %] 95 %  BP: (121-154)/(60-75) 134/67     Weight: 81.6 kg (180 lb)  Body mass index is 32.92 kg/m².    Physical Exam   Constitutional: No distress.   HENT:   Head: Normocephalic and atraumatic.   Eyes: Pupils are equal, round, and reactive to light. EOM are normal.   Neck: Normal range of motion. No tracheal deviation present.   Cardiovascular: Normal rate and regular rhythm.   Pulmonary/Chest: She has wheezes. She has rales.   rales   Abdominal: Soft. Bowel sounds are normal.   Musculoskeletal: Normal range of motion. She exhibits no deformity.   Neurological: She is alert. She exhibits normal muscle tone.   Skin: Skin is warm. Capillary refill takes 2 to 3 seconds. She is not diaphoretic. No pallor.   Psychiatric: She has a normal mood and affect. Her behavior is normal.   Vitals reviewed.       Significant Labs: All pertinent labs within the past 24 hours have been reviewed.    Significant Imaging: I have reviewed and interpreted all pertinent imaging results/findings within the past 24 hours.

## 2020-03-12 NOTE — CONSULTS
[facility]  Infectious Disease  Consult Note    Patient Name: Hannah Montoya  MRN: [id number]  Admission Date: 3/11/2020  Hospital Length of Stay: 0 days  Attending Physician: Deja Doll MD  Primary Care Provider: Emanuel Chavez MD     Isolation Status: Airborne and Special Contact    Patient information was obtained from patient and ER records.      Inpatient consult to Infectious Diseases  Consult performed by: Haily Oconnell MD  Consult ordered by: Teri Marley MD        Assessment/Plan:     COPD exacerbation  64F with a h/o COPD admitted with 2 weeks of worsening URI and fever and sob/wheezing. Daughter also sick and works in [place]. Fever at home, but tmax here 99.1. Hypoxic to 86%. Wheezing on exam. CT with RLL mass 3.2 c/f malignancy and also pneumonia. Flu neg    Agree with treating copd exacerbation. Suspect viral trigger. resp pcr panel pending. There is community spread of covid19 and pt has risk factor and sick contact and agree with sending test. Spoke with [facility] hotline and they have authorized testing PUI LA [id number]. Discussed collection and form with pt's nurse Hali. Would only admit to hospital if needed for other reasons such as work up for malignancy. Otherwise, would tell her to self quarantine at home until testing returns. Pt should return to hospital if worsening respiratory symtpoms        Thank you for your consult. I will sign off. Please contact us if you have any additional questions.    Haily Oconnell MD  Infectious Disease  [facility]    Subjective:     Principal Problem: Hypoxia    HPI: 64F with a h/o COPD/asthma, DM2, HTN, CMO, pacmaker placement, h/o breast cancer who presents to the ED with 2 weeks of worsening URI and fever and sob/wheezing. Reports symptoms similar to prior COPD exacerabation. Says her daughter works at [employer] (the one behind [employer]) and had been home off of work sick for last week with  similar symptoms. Pt reports cough productive of phlegm and wheezing. Symptoms improving with duonebs. Says her daughter told NH that her mom was being tested for COVID, so they told her not to come to work. Pt very upset about being in hospital and not getting food. Asking to go home and says she's feeling better. Says she has home o2. apparantly lives alone, but daughter visits frequently.  reportedly passed away a few years ago. Quit tobacco.      Past Medical History:   Diagnosis Date    AICD (automatic cardioverter/defibrillator) present     Asthma     bronchitis in past    Breast cancer 2016    right    Cardiac pacemaker     Cardiomyopathy     COPD (chronic obstructive pulmonary disease)     Diabetes mellitus, type 2     Hyperglycemia     Hyperlipidemia     Hypertension     Malignant neoplasm of overlapping sites of female breast 2016    Respiratory distress 3/12/2020       Past Surgical History:   Procedure Laterality Date    [facility]OPSY Right 2016    [facility]    BREAST LUMPECTOMY Right     CARDIAC CATHETERIZATION Bilateral 2017    CARDIAC DEFIBRILLATOR PLACEMENT Left 08/10/2017    CARDIAC DEFIBRILLATOR PLACEMENT Left 2018     SECTION      x2    CHOLECYSTECTOMY      REVISION OF IMPLANTABLE CARDIOVERTER-DEFIBRILLATOR (ICD) ELECTRODE LEAD PLACEMENT N/A 6/15/2018    Procedure: REVISION-LEAD-ICD;  Surgeon: Javy Gates MD;  Location: [facility] CATH LAB;  Service: Cardiology;  Laterality: N/A;  LBBB, Bi-V ICD HIS Ld rev, MDT, Choice, MB, 3 Prep    TUBAL LIGATION         Review of patient's allergies indicates:   Allergen Reactions    Adhesive Rash       No current facility-administered medications on file prior to encounter.      Current Outpatient Medications on File Prior to Encounter   Medication Sig    albuterol sulfate (PROAIR RESPICLICK) 90 mcg/actuation AePB Inhale 2 puffs into the lungs every 4 (four) hours as needed. Rescue    albuterol-ipratropium  (DUO-NEB) 2.5 mg-0.5 mg/3 mL nebulizer solution Use 1 vial IN NEBULIZER EVERY 6 HOURS AS NEEDED FOR FOR WHEEZING    atorvastatin (LIPITOR) 10 MG tablet Take 1 tablet (10 mg total) by mouth once daily.    carvedilol (COREG) 25 MG tablet TAKE 1 TABLET(25 MG) BY MOUTH TWICE DAILY    fluticasone-salmeterol diskus inhaler 250-50 mcg INHALE 1 PUFF BY MOUTH TWICE DAILY    furosemide (LASIX) 40 MG tablet TAKE 1 TABLET(40 MG) BY MOUTH TWICE DAILY    metFORMIN (GLUCOPHAGE) 500 MG tablet TAKE ONE TABLET BY MOUTH TWICE DAILY    multivitamin (ONE DAILY MULTIVITAMIN) per tablet Take 1 tablet by mouth once daily.    sertraline (ZOLOFT) 100 MG tablet TAKE ONE TABLET BY MOUTH EVERY MORNING    cetirizine (ZYRTEC) 10 MG tablet Take 10 mg by mouth every evening.     doxycycline (VIBRA-TABS) 100 MG tablet Take 1 tablet (100 mg total) by mouth every 12 (twelve) hours. (Patient not taking: Reported on 10/1/2019)    fluticasone propionate (FLONASE) 50 mcg/actuation nasal spray 1 spray (50 mcg total) by Each Nare route once daily.    fluticasone propionate (FLONASE) 50 mcg/actuation nasal spray 1 spray (50 mcg total) by Each Nostril route 2 (two) times daily as needed for Rhinitis.    ibuprofen (ADVIL,MOTRIN) 800 MG tablet Take 1 tablet (800 mg total) by mouth every 8 (eight) hours as needed for Pain. (Patient not taking: Reported on 10/1/2019)    lisinopril 10 MG tablet Take 1 tablet (10 mg total) by mouth once daily. (Patient taking differently: Take 10 mg by mouth as needed. )    predniSONE (DELTASONE) 20 MG tablet Take 3 tablets (60 mg total) by mouth once daily.    TRUETEST TEST STRIPS Strp test once EVERY MORNING     Family History     Problem Relation (Age of Onset)    Clotting disorder Brother    Kidney disease Mother, Father        Tobacco Use    Smoking status: Former Smoker     Packs/day: 0.50     Years: 40.00     Pack years: 20.00     Types: Cigarettes     Last attempt to quit: 8/1/2016     Years since quitting:  3.6    Smokeless tobacco: Never Used   Substance and Sexual Activity    Alcohol use: Yes     Comment: rare- holiday    Drug use: No    Sexual activity: Not on file     Review of Systems   Constitutional: Negative for diaphoresis and fever.   HENT: Negative for facial swelling and nosebleeds.    Eyes: Negative for pain and visual disturbance.   Respiratory: Positive for cough, shortness of breath and wheezing. Negative for chest tightness.    Cardiovascular: Negative for chest pain and palpitations.   Gastrointestinal: Negative for diarrhea and nausea.   Endocrine: Negative for cold intolerance and heat intolerance.   Genitourinary: Negative for hematuria and urgency.   Musculoskeletal: Negative for arthralgias and back pain.   Skin: Negative for pallor and rash.   Neurological: Negative for syncope and speech difficulty.   Hematological: Negative for adenopathy. Does not bruise/bleed easily.   Psychiatric/Behavioral: Negative for confusion and hallucinations.     Objective:     Vital Signs (Most Recent):  Temp: 98.2 °F (36.8 °C) (03/12/20 0833)  Pulse: 72 (03/12/20 1531)  Resp: 19 (03/12/20 1531)  BP: 132/61 (03/12/20 1531)  SpO2: (!) 93 % (03/12/20 1531) Vital Signs (24h Range):  Temp:  [98.2 °F (36.8 °C)-99.1 °F (37.3 °C)] 98.2 °F (36.8 °C)  Pulse:  [69-90] 72  Resp:  [0-30] 19  SpO2:  [86 %-99 %] 93 %  BP: (118-174)/(59-88) 132/61     Weight: 81.6 kg (180 lb)  Body mass index is 32.92 kg/m².    Physical Exam   Constitutional: No distress.   HENT:   Head: Normocephalic and atraumatic.   Eyes: Pupils are equal, round, and reactive to light. EOM are normal.   Neck: Normal range of motion. No tracheal deviation present.   Cardiovascular: Normal rate and regular rhythm.   Pulmonary/Chest: She has wheezes. She has rales.   rales   Abdominal: Soft. Bowel sounds are normal.   Musculoskeletal: Normal range of motion. She exhibits no deformity.   Neurological: She is alert. She exhibits normal muscle tone.   Skin: Skin  is warm. She is not diaphoretic. No pallor.   Psychiatric: She has a normal mood and affect. Her behavior is normal.   Vitals reviewed.       Significant Labs: All pertinent labs within the past 24 hours have been reviewed.    Significant Imaging: I have reviewed and interpreted all pertinent imaging results/findings within the past 24 hours.

## 2020-03-12 NOTE — ED NOTES
Social work states pt approved for home O2.  Patient states that she is able to get family to come pick her up.  Dr Doll will place discharge orders.

## 2020-03-12 NOTE — ED PROVIDER NOTES
"Encounter Date: 3/11/2020    SCRIBE #1 NOTE: I, Spring An, am scribing for, and in the presence of,  Teri Marley MD. I have scribed the following portions of the note - Other sections scribed: HPI, ROS, PE, ED Management.       History     Chief Complaint   Patient presents with    Shortness of Breath     Pt with hx of COPD c/o SOB and "low grade fever" starting today. RR unlabored at present. No travel recently. Pt states she was at home for 14 days with URI symptoms      CC: SOB    Patient is a 64 y.o female with a PMHx of Asthma, Cardiac pacemaker, Cardiomyopathy, COPD, DM, HTN, and HLD who presents to the ED complaining of SOB that began today. She reports of a low grade fever of 99 F today. Patient reports of nasal congestion and productive cough with green sputum for that past 10 days. Patient states she was in her house for the past 2 weeks due to this illness. She denies associated hemoptysis or orthopnea. Patient admits to taking Advair, Flonase, and Albuterol treatments since onset 10 days ago. Known sick contact with daughter with URI symptoms. She reports of Hx of similar symptoms. Hx of need for intubation due to due COPD in 2016 with ICU admission. PSHx of Pacemaker implantation due to cardiac damage from PNA during ICU stay in 2016. No recent ABx or steroid use. No home oxygen administration. Patient denies CP, nausea, emesis, and diarrhea. She reports of a decreased appetite. Patient noticed decrease in weight on 2020 at a clinic visit. PSHx of breast lumpectomy, cholecystectomy,  section, and tubal ligation. Patient takes Metformin BID, Coreg BID, Lasix BID, Citrizine at night, Atorvastatin at night, and Lisinopril PRN. Patient is a former smoker with cessation in 2016. No SHx of frequent alcohol consumption or drug use.     The history is provided by the patient.     Review of patient's allergies indicates:   Allergen Reactions    Adhesive Rash     Past Medical History: "   Diagnosis Date    Asthma     bronchitis in past    Breast cancer 2016    right    Cardiac pacemaker     Cardiomyopathy     COPD (chronic obstructive pulmonary disease)     Diabetes mellitus, type 2     Hyperglycemia     Hyperlipidemia     Hypertension     Malignant neoplasm of overlapping sites of female breast 2016    Respiratory distress 3/12/2020     Past Surgical History:   Procedure Laterality Date    BREAST BIOPSY Right 2016    [facility]    BREAST LUMPECTOMY Right      SECTION      x2    CHOLECYSTECTOMY      REVISION OF IMPLANTABLE CARDIOVERTER-DEFIBRILLATOR (ICD) ELECTRODE LEAD PLACEMENT N/A 6/15/2018    Procedure: REVISION-LEAD-ICD;  Surgeon: Javy Gates MD;  Location: [facility] CATH LAB;  Service: Cardiology;  Laterality: N/A;  LBBB, Bi-V ICD HIS Ld rev, MDT, Choice, MB, 3 Prep    TUBAL LIGATION       Family History   Problem Relation Age of Onset    Kidney disease Mother     Kidney disease Father     Clotting disorder Brother     Breast cancer Neg Hx     Ovarian cancer Neg Hx      Social History     Tobacco Use    Smoking status: Former Smoker     Packs/day: 0.50     Years: 40.00     Pack years: 20.00     Types: Cigarettes     Last attempt to quit: 2016     Years since quitting: 3.6    Smokeless tobacco: Never Used   Substance Use Topics    Alcohol use: Yes     Comment: rare- holiday    Drug use: No     Review of Systems   Constitutional: Positive for appetite change (Decreased), fever (99.0 at home per patient) and unexpected weight change (Decreased). Negative for chills and diaphoresis.   HENT: Positive for congestion.    Eyes: Negative for photophobia and visual disturbance.   Respiratory: Positive for cough (Productive) and shortness of breath.    Cardiovascular: Negative for chest pain and leg swelling.   Gastrointestinal: Negative for abdominal pain, blood in stool, constipation, diarrhea, nausea and vomiting.   Genitourinary: Negative for dysuria, flank  pain, frequency, hematuria and urgency.   Musculoskeletal: Negative for back pain, neck pain and neck stiffness.   Skin: Negative for rash and wound.   Neurological: Negative for weakness, light-headedness, numbness and headaches.   Psychiatric/Behavioral: Negative for confusion and suicidal ideas.   All other systems reviewed and are negative.      Physical Exam     Initial Vitals [03/11/20 1709]   BP Pulse Resp Temp SpO2   (!) 154/72 83 18 99.1 °F (37.3 °C) (!) 91 %      MAP       --         Physical Exam    Nursing note and vitals reviewed.  Constitutional: She appears well-developed and well-nourished. She is not diaphoretic. No distress.   Patient appears well, nontoxic    HENT:   Head: Normocephalic and atraumatic.   Mouth/Throat: Oropharynx is clear and moist. No oropharyngeal exudate.   Eyes: Conjunctivae and EOM are normal. Pupils are equal, round, and reactive to light. Right eye exhibits no discharge. Left eye exhibits no discharge.   Neck: Normal range of motion. Neck supple. No JVD present.   Cardiovascular: Normal rate, regular rhythm, normal heart sounds and intact distal pulses. Exam reveals no gallop and no friction rub.    No murmur heard.  Pulmonary/Chest: No respiratory distress. She has wheezes. She has no rhonchi. She has no rales. She exhibits no tenderness.   + Tight lung sounds with wheezing noted. Oxygen saturation 86-88 on RA, patient placed on 2L NC   Abdominal: Soft. Bowel sounds are normal. She exhibits no distension and no mass. There is no tenderness. There is no rebound and no guarding.   Musculoskeletal: Normal range of motion. She exhibits no edema or tenderness.   Lymphadenopathy:     She has no cervical adenopathy.   Neurological: She is alert and oriented to person, place, and time. No cranial nerve deficit or sensory deficit. GCS score is 15. GCS eye subscore is 4. GCS verbal subscore is 5. GCS motor subscore is 6.   Skin: Skin is warm and dry. Capillary refill takes less than  2 seconds. No rash noted. No erythema.   Psychiatric: She has a normal mood and affect. Her behavior is normal. Judgment and thought content normal.         ED Course   Critical Care  Date/Time: 3/12/2020 8:04 AM  Performed by: Teri Marley MD  Authorized by: Deja Doll MD   Direct patient critical care time: 15 minutes  Ordering / reviewing critical care time: 10 minutes  Documentation critical care time: 5 minutes  Consulting other physicians critical care time: 15 minutes  Total critical care time (exclusive of procedural time) : 45 minutes        Labs Reviewed   COMPREHENSIVE METABOLIC PANEL - Abnormal; Notable for the following components:       Result Value    CO2 30 (*)     Glucose 127 (*)     All other components within normal limits   CBC W/ AUTO DIFFERENTIAL - Abnormal; Notable for the following components:    Mean Corpuscular Hemoglobin Conc 31.8 (*)     Immature Granulocytes 1.0 (*)     Gran # (ANC) 8.4 (*)     Immature Grans (Abs) 0.11 (*)     Mono # 1.1 (*)     Lymph% 15.5 (*)     All other components within normal limits   LIPASE - Abnormal; Notable for the following components:    Lipase 163 (*)     All other components within normal limits   URINALYSIS, REFLEX TO URINE CULTURE - Abnormal; Notable for the following components:    Protein, UA 2+ (*)     All other components within normal limits    Narrative:     Preferred Collection Type->Urine, Clean Catch   COMPREHENSIVE METABOLIC PANEL - Abnormal; Notable for the following components:    Sodium 135 (*)     Glucose 286 (*)     Albumin 3.4 (*)     eGFR if non  54 (*)     All other components within normal limits   CBC W/ AUTO DIFFERENTIAL - Abnormal; Notable for the following components:    Immature Granulocytes 1.1 (*)     Gran # (ANC) 8.5 (*)     Immature Grans (Abs) 0.10 (*)     Lymph # 0.7 (*)     Mono # 0.2 (*)     Gran% 89.2 (*)     Lymph% 7.4 (*)     Mono% 2.1 (*)     All other components within normal limits    CULTURE, BLOOD   CULTURE, BLOOD   RESPIRATORY INFECTION PANEL (PCR), NASOPHARYNGEAL   B-TYPE NATRIURETIC PEPTIDE   TROPONIN I   URINALYSIS MICROSCOPIC    Narrative:     Preferred Collection Type->Urine, Clean Catch   POCT INFLUENZA A/B MOLECULAR   POCT GLUCOSE MONITORING CONTINUOUS     EKG Readings: (Independently Interpreted)   Normal sinus rhythm at 82 with T-wave inversions in V4 V5 V6.  Patient with paced rhythm.  No ST elevation or depression.  Normal axis.       Imaging Results           CTA Chest Non-Coronary (PE Study) (Final result)  Result time 03/11/20 22:54:42    Final result by Dwaine Clay MD (03/11/20 22:54:42)                 Impression:      1. Right lower lobe 3.2 cm soft tissue mass concerning for malignancy.  Further evaluation is advised with PET/CT and/or biopsy.  2. Right lower lobe 2 cm posterior pleural based nodule versus focal consolidation.  3. Consolidation seen within the medial aspect of the right upper lobe which may reflect atelectasis and/or pneumonia.  4. Multifocal patchy opacities seen within the left lower lobe.  This is nonspecific but may reflect multifocal infectious or inflammatory process.  No confluent consolidation seen in this region.  Some of these opacities demonstrates somewhat nodular configuration.  This report was flagged in Epic as abnormal.      Electronically signed by: Dwaine Clay MD  Date:    03/11/2020  Time:    22:54             Narrative:    EXAMINATION:  CTA CHEST NON CORONARY    CLINICAL HISTORY:  Chest pain, acute, PE suspected, high pretest prob;abnormal CXR, hypoxia;    TECHNIQUE:  Low dose axial images, sagittal and coronal reformations were obtained from the thoracic inlet to the lung bases following the IV administration of 75 mL of Omnipaque 350.  Contrast timing was optimized to evaluate the pulmonary arteries.  MIP images were performed.    COMPARISON:  CTA chest from August 2016.    FINDINGS:  Structures at the base of the neck are  unremarkable.  Left-sided pacer device is seen.  Aorta is non-aneurysmal.  The heart is normal in size without pericardial effusion.  No intraluminal filling defects within the pulmonary arteries to suggest pulmonary thromboembolism.   Several prominent mediastinal lymph nodes are seen which do not meet CT criteria for enlargement.  The esophagus is unremarkable along its course.    The trachea and bronchi are patent.  The lungs are symmetrically expanded.  There is a 3.2 cm soft tissue mass visualized within the right lower lobe.  Rounded focal consolidation versus pleural based nodule measuring 2 cm is visualized along the posterior pleural surface of the right lung base.  Confluent consolidative changes are visualized within the medial aspect of the right upper lobe which accounts for the right suprahilar findings in question on earlier chest radiograph.  Multifocal small patchy opacities are visualized within the left lower lobe some of which demonstrate somewhat nodular configuration.  No evidence of pleural effusion.    The visualized abdominal structures are unremarkable.  No acute osseous abnormality identified.  Extrathoracic soft tissues are unremarkable.                                X-Ray Chest PA And Lateral (Final result)  Result time 03/11/20 18:08:50    Final result by Dwaine Clay MD (03/11/20 18:08:50)                 Impression:      Right lower lung zone 4 cm ovoid lesion and findings concerning for additional underlying lesion involving the right perihilar region.  Contrast enhanced CT follow-up is recommended.    This report was flagged in Epic as abnormal.      Electronically signed by: Dwaine Clay MD  Date:    03/11/2020  Time:    18:08             Narrative:    EXAMINATION:  XR CHEST PA AND LATERAL    CLINICAL HISTORY:  Shortness of breath    TECHNIQUE:  PA and lateral views of the chest were performed.    COMPARISON:  June 2018.    FINDINGS:  Left-sided pacer device is seen.  Heart  is stable in size.  Ovoid 4 cm mass lesion is seen within the right lower lung zone.  Additionally there is abnormal soft tissue fullness concerning for possible underlying lesion involving the right perihilar region.  Increased attenuation is seen within the lower lung zones which may relate to increased overlying soft tissue breast attenuation, noting findings appear symmetric bilaterally.  No evidence of pneumothorax or large pleural effusion.  No acute osseous abnormality identified.                                 Medical Decision Making:   Independently Interpreted Test(s):   I have ordered and independently interpreted EKG Reading(s) - see prior notes  Clinical Tests:   Lab Tests: Ordered and Reviewed  Radiological Study: Ordered and Reviewed  Medical Tests: Ordered and Reviewed  ED Management:  2338: Spoke to infectious disease. Will contact Health department.     2344: Spoke to Health department.     0106: Discussed patient with Dr. Faulkner, hospitalist.     MDM  This evaluation a 64-year-old female with past medical history of COPD who presents with shortness of breath x1 day and recent URI symptoms.  She reports cough with productive green sputum.  On exam patient with tight lung sounds, noted to have mild hypoxia to 86-88% on room air which improved to the mid 90s on 2 L nasal cannula.  Differential diagnosis includes was not limited to COPD exacerbation, CHF, pneumonia, pneumothorax, PE, anemia, viral URI.  Patient's labs with mildly elevated blood glucose to 286.  White blood cells at 9.5.  Influenza negative.  Lipase mildly elevated 163.  BNP and troponin within normal limits.  Urinalysis without any signs or symptoms of infection.  Patient's chest x-ray showed a masslike lesion in the right lower lobe with some perihilar fullness.  Discussed with patient the differential for this and recommended a CT with contrast as well as a PE study given patient's hypoxia and shortness of breath and probable  malignancy.  CT PE with a right lower lobe soft tissue mass concerning for malignancy as well as a right lower lobe pleural-based nodule.  She also has consolidation within the right upper lobe as well as multifocal patchy opacities within the left lower lobe.  Given patient with bilateral pneumonia without any other known sources discussed with ID for possible Covid.  After patient's bilateral pneumonias were noted she was placed on airborne and contact precautions.  ID (Dr. Milton) recommends discussing with the [facility].  Discussed with the [facility] department and recommends patient be tested with a viral panel and if negative called them back for probably covid testing.  Patient denies any recent travel, known contacts to covid patient's, and states she is disabled for the last 14 days has been in her house with just her daughter visiting.  She has her groceries delivered and states she has not left the house in 14 days.  Patient is placed on precautions and viral panel was ordered.  I discussed with patient's not only the possibility of testing for Covid if viral panel is negative but also her CT findings concerning for malignancy.  I have also started her on ceftriaxone azithromycin for cap coverage.  She received 2 rounds of 3 DuoNeb treatments with improvement of her symptoms and breath sounds.  She is still requiring some oxygen with O2 sats in the 93-95 on 2 L. discussed case with Dr. Faulkner with Hospital Medicine and he agrees to admit the patient for further testing, treatment and oxygen therapy.          Scribe Attestation:   Scribe #1: I performed the above scribed service and the documentation accurately describes the services I performed. I attest to the accuracy of the note.                          Clinical Impression:       ICD-10-CM ICD-9-CM   1. Hypoxia R09.02 799.02   2. SOB (shortness of breath) R06.02 786.05   3. Pneumonia of both lungs due to infectious organism,  unspecified part of lung J18.9 483.8   4. COPD exacerbation J44.1 491.21   5. Pulmonary nodule R91.1 793.11             ED Disposition Condition    Admit                I, Teri Marley, personally performed the services described in this documentation. All medical record entries made by the scribe were at my direction and in my presence.  I have reviewed the chart and agree that the record reflects my personal performance and is accurate and complete.             Teri Marley MD  03/12/20 0802       Teri Marley MD  03/12/20 0802       Teri Marley MD  03/12/20 0804

## 2020-03-12 NOTE — PROGRESS NOTES
Per Vernon Crain with [facility] O2 will be delivered.  SW sent orders, progress notes, O2 eval, and H&P to [facility] via [facility].  SW spoke with after hours technican (Serjio) to advise of order and to advise of needed precautions because patient is discharging to home for self quarantine for possible exposure to covid19.  SW will f/u with respiratory technician to deliver portable O2 to patient's room.  (Consulted with  Director & Manager regarding above case).    Carlie Whelan LMSW CCM

## 2020-03-12 NOTE — PROGRESS NOTES
DEEP spke with respiratory  to advise of need to deliver portable O2 tank to patient in room 26.  face sheet, order, O2 eval, and progress note attached.    Carlie Whelan LMSW, ccm  3/12/2020

## 2020-03-12 NOTE — SUBJECTIVE & OBJECTIVE
Past Medical History:   Diagnosis Date    AICD (automatic cardioverter/defibrillator) present     Asthma     bronchitis in past    Breast cancer 2016    right    Cardiac pacemaker     Cardiomyopathy     COPD (chronic obstructive pulmonary disease)     Diabetes mellitus, type 2     Hyperglycemia     Hyperlipidemia     Hypertension     Malignant neoplasm of overlapping sites of female breast 2016    Respiratory distress 3/12/2020       Past Surgical History:   Procedure Laterality Date    BREAST BIOPSY Right 2016    [facility]    BREAST LUMPECTOMY Right     CARDIAC CATHETERIZATION Bilateral 2017    CARDIAC DEFIBRILLATOR PLACEMENT Left 08/10/2017    CARDIAC DEFIBRILLATOR PLACEMENT Left 2018     SECTION      x2    CHOLECYSTECTOMY      REVISION OF IMPLANTABLE CARDIOVERTER-DEFIBRILLATOR (ICD) ELECTRODE LEAD PLACEMENT N/A 6/15/2018    Procedure: REVISION-LEAD-ICD;  Surgeon: Javy Gates MD;  Location: [facility];  Service: Cardiology;  Laterality: N/A;  LBBB, Bi-V ICD HIS Ld rev, MDT, Choice, MB, 3 Prep    TUBAL LIGATION         Review of patient's allergies indicates:   Allergen Reactions    Adhesive Rash       No current facility-administered medications on file prior to encounter.      Current Outpatient Medications on File Prior to Encounter   Medication Sig    albuterol sulfate (PROAIR RESPICLICK) 90 mcg/actuation AePB Inhale 2 puffs into the lungs every 4 (four) hours as needed. Rescue    albuterol-ipratropium (DUO-NEB) 2.5 mg-0.5 mg/3 mL nebulizer solution Use 1 vial IN NEBULIZER EVERY 6 HOURS AS NEEDED FOR FOR WHEEZING    atorvastatin (LIPITOR) 10 MG tablet Take 1 tablet (10 mg total) by mouth once daily.    carvedilol (COREG) 25 MG tablet TAKE 1 TABLET(25 MG) BY MOUTH TWICE DAILY    fluticasone-salmeterol diskus inhaler 250-50 mcg INHALE 1 PUFF BY MOUTH TWICE DAILY    furosemide (LASIX) 40 MG tablet TAKE 1 TABLET(40 MG) BY MOUTH TWICE DAILY    metFORMIN  (GLUCOPHAGE) 500 MG tablet TAKE ONE TABLET BY MOUTH TWICE DAILY    multivitamin (ONE DAILY MULTIVITAMIN) per tablet Take 1 tablet by mouth once daily.    sertraline (ZOLOFT) 100 MG tablet TAKE ONE TABLET BY MOUTH EVERY MORNING    cetirizine (ZYRTEC) 10 MG tablet Take 10 mg by mouth every evening.     doxycycline (VIBRA-TABS) 100 MG tablet Take 1 tablet (100 mg total) by mouth every 12 (twelve) hours. (Patient not taking: Reported on 10/1/2019)    fluticasone propionate (FLONASE) 50 mcg/actuation nasal spray 1 spray (50 mcg total) by Each Nare route once daily.    fluticasone propionate (FLONASE) 50 mcg/actuation nasal spray 1 spray (50 mcg total) by Each Nostril route 2 (two) times daily as needed for Rhinitis.    ibuprofen (ADVIL,MOTRIN) 800 MG tablet Take 1 tablet (800 mg total) by mouth every 8 (eight) hours as needed for Pain. (Patient not taking: Reported on 10/1/2019)    lisinopril 10 MG tablet Take 1 tablet (10 mg total) by mouth once daily. (Patient taking differently: Take 10 mg by mouth as needed. )    predniSONE (DELTASONE) 20 MG tablet Take 3 tablets (60 mg total) by mouth once daily.    TRUETEST TEST STRIPS Strp test once EVERY MORNING     Family History     Problem Relation (Age of Onset)    Clotting disorder Brother    Kidney disease Mother, Father        Tobacco Use    Smoking status: Former Smoker     Packs/day: 0.50     Years: 40.00     Pack years: 20.00     Types: Cigarettes     Last attempt to quit: 8/1/2016     Years since quitting: 3.6    Smokeless tobacco: Never Used   Substance and Sexual Activity    Alcohol use: Yes     Comment: rare- holiday    Drug use: No    Sexual activity: Not on file     Review of Systems   Constitutional: Negative for diaphoresis and fever.   HENT: Negative for facial swelling and nosebleeds.    Eyes: Negative for pain and visual disturbance.   Respiratory: Positive for cough, shortness of breath and wheezing. Negative for chest tightness.     Cardiovascular: Negative for chest pain and palpitations.   Gastrointestinal: Negative for diarrhea and nausea.   Endocrine: Negative for cold intolerance and heat intolerance.   Genitourinary: Negative for hematuria and urgency.   Musculoskeletal: Negative for arthralgias and back pain.   Skin: Negative for pallor and rash.   Neurological: Negative for syncope and speech difficulty.   Hematological: Negative for adenopathy. Does not bruise/bleed easily.   Psychiatric/Behavioral: Negative for confusion and hallucinations.     Objective:     Vital Signs (Most Recent):  Temp: 98.2 °F (36.8 °C) (03/12/20 0833)  Pulse: 72 (03/12/20 1531)  Resp: 19 (03/12/20 1531)  BP: 132/61 (03/12/20 1531)  SpO2: (!) 93 % (03/12/20 1531) Vital Signs (24h Range):  Temp:  [98.2 °F (36.8 °C)-99.1 °F (37.3 °C)] 98.2 °F (36.8 °C)  Pulse:  [69-90] 72  Resp:  [0-30] 19  SpO2:  [86 %-99 %] 93 %  BP: (118-174)/(59-88) 132/61     Weight: 81.6 kg (180 lb)  Body mass index is 32.92 kg/m².    Physical Exam   Constitutional: No distress.   HENT:   Head: Normocephalic and atraumatic.   Eyes: Pupils are equal, round, and reactive to light. EOM are normal.   Neck: Normal range of motion. No tracheal deviation present.   Cardiovascular: Normal rate and regular rhythm.   Pulmonary/Chest: She has wheezes. She has rales.   rales   Abdominal: Soft. Bowel sounds are normal.   Musculoskeletal: Normal range of motion. She exhibits no deformity.   Neurological: She is alert. She exhibits normal muscle tone.   Skin: Skin is warm. She is not diaphoretic. No pallor.   Psychiatric: She has a normal mood and affect. Her behavior is normal.   Vitals reviewed.       Significant Labs: All pertinent labs within the past 24 hours have been reviewed.    Significant Imaging: I have reviewed and interpreted all pertinent imaging results/findings within the past 24 hours.

## 2020-03-12 NOTE — H&P
"[facility] Medicine  History & Physical    Patient Name: Hannah Montoya  MRN: [id number]  Admission Date: 3/11/2020  Attending Physician: Deja Doll MD   Primary Care Provider: Emanuel Chavez MD         Patient information was obtained from ER records.     Subjective:     Principal Problem:<principal problem not specified>    Chief Complaint:   Chief Complaint   Patient presents with    Shortness of Breath     Pt with hx of COPD c/o SOB and "low grade fever" starting today. RR unlabored at present. No travel recently. Pt states she was at home for 14 days with URI symptoms         HPI: Pt is a 65 yo F with a h/o COPD/asthma, DM2, HTN, CMO, pacmaker placement, h/o breast cancer who presents to the ED for SOB.  Pt did have a productive cough and wheezing as well.  She reported 10 day h/o nasal congestion/upper respiratory symptoms with green phlegm production.  She deneis any chest pain, hemoptysis, orthopnea, PND, dizziness, or palpitations.  Of note her daughter has been sick around her with Uri symptoms as well.  She has had poor appeteite and dec in weight recently.  In the ER she was found to be in COPD exacerbation, have PNA and new lung mass.         Past Medical History:   Diagnosis Date    Asthma     bronchitis in past    Breast cancer 2016    right    Cardiac pacemaker     Cardiomyopathy     COPD (chronic obstructive pulmonary disease)     Diabetes mellitus, type 2     Hyperglycemia     Hyperlipidemia     Hypertension     Malignant neoplasm of overlapping sites of female breast 2016    Respiratory distress 3/12/2020       Past Surgical History:   Procedure Laterality Date    BREAST BIOPSY Right 2016    [facility]    BREAST LUMPECTOMY Right      SECTION      x2    CHOLECYSTECTOMY      REVISION OF IMPLANTABLE CARDIOVERTER-DEFIBRILLATOR (ICD) ELECTRODE LEAD PLACEMENT N/A 6/15/2018    Procedure: REVISION-LEAD-ICD;  Surgeon: Javy Gates, " MD;  Location: [facility] CATH LAB;  Service: Cardiology;  Laterality: N/A;  LBBB, Bi-V ICD HIS Ld rev, MDT, Choice, MB, 3 Prep    TUBAL LIGATION         Review of patient's allergies indicates:   Allergen Reactions    Adhesive Rash       No current facility-administered medications on file prior to encounter.      Current Outpatient Medications on File Prior to Encounter   Medication Sig    albuterol sulfate (PROAIR RESPICLICK) 90 mcg/actuation AePB Inhale 2 puffs into the lungs every 4 (four) hours as needed. Rescue    albuterol-ipratropium (DUO-NEB) 2.5 mg-0.5 mg/3 mL nebulizer solution Use 1 vial IN NEBULIZER EVERY 6 HOURS AS NEEDED FOR FOR WHEEZING    atorvastatin (LIPITOR) 10 MG tablet Take 1 tablet (10 mg total) by mouth once daily.    carvedilol (COREG) 25 MG tablet TAKE 1 TABLET(25 MG) BY MOUTH TWICE DAILY    fluticasone-salmeterol diskus inhaler 250-50 mcg INHALE 1 PUFF BY MOUTH TWICE DAILY    furosemide (LASIX) 40 MG tablet TAKE 1 TABLET(40 MG) BY MOUTH TWICE DAILY    metFORMIN (GLUCOPHAGE) 500 MG tablet TAKE ONE TABLET BY MOUTH TWICE DAILY    multivitamin (ONE DAILY MULTIVITAMIN) per tablet Take 1 tablet by mouth once daily.    sertraline (ZOLOFT) 100 MG tablet TAKE ONE TABLET BY MOUTH EVERY MORNING    cetirizine (ZYRTEC) 10 MG tablet Take 10 mg by mouth every evening.     doxycycline (VIBRA-TABS) 100 MG tablet Take 1 tablet (100 mg total) by mouth every 12 (twelve) hours. (Patient not taking: Reported on 10/1/2019)    fluticasone propionate (FLONASE) 50 mcg/actuation nasal spray 1 spray (50 mcg total) by Each Nare route once daily.    fluticasone propionate (FLONASE) 50 mcg/actuation nasal spray 1 spray (50 mcg total) by Each Nostril route 2 (two) times daily as needed for Rhinitis.    ibuprofen (ADVIL,MOTRIN) 800 MG tablet Take 1 tablet (800 mg total) by mouth every 8 (eight) hours as needed for Pain. (Patient not taking: Reported on 10/1/2019)    lisinopril 10 MG tablet Take 1 tablet (10 mg  total) by mouth once daily. (Patient taking differently: Take 10 mg by mouth as needed. )    predniSONE (DELTASONE) 20 MG tablet Take 3 tablets (60 mg total) by mouth once daily.    TRUETEST TEST STRIPS Strp test once EVERY MORNING     Family History     Problem Relation (Age of Onset)    Clotting disorder Brother    Kidney disease Mother, Father        Tobacco Use    Smoking status: Former Smoker     Packs/day: 0.50     Years: 40.00     Pack years: 20.00     Types: Cigarettes     Last attempt to quit: 8/1/2016     Years since quitting: 3.6    Smokeless tobacco: Never Used   Substance and Sexual Activity    Alcohol use: Yes     Comment: rare- holiday    Drug use: No    Sexual activity: Not on file     Review of Systems   Constitutional: Negative for diaphoresis and fever.   HENT: Negative for facial swelling and nosebleeds.    Eyes: Negative for pain and visual disturbance.   Respiratory: Positive for cough, shortness of breath and wheezing. Negative for chest tightness.    Cardiovascular: Negative for chest pain and palpitations.   Gastrointestinal: Negative for diarrhea and nausea.   Endocrine: Negative for cold intolerance and heat intolerance.   Genitourinary: Negative for hematuria and urgency.   Musculoskeletal: Negative for arthralgias and back pain.   Skin: Negative for pallor and rash.   Neurological: Negative for syncope and speech difficulty.   Hematological: Negative for adenopathy. Does not bruise/bleed easily.   Psychiatric/Behavioral: Negative for confusion and hallucinations.     Objective:     Vital Signs (Most Recent):  Temp: 98.3 °F (36.8 °C) (03/12/20 0240)  Pulse: 74 (03/12/20 0423)  Resp: 20 (03/12/20 0401)  BP: 134/67 (03/12/20 0401)  SpO2: 95 % (03/12/20 0423) Vital Signs (24h Range):  Temp:  [98.3 °F (36.8 °C)-99.1 °F (37.3 °C)] 98.3 °F (36.8 °C)  Pulse:  [74-90] 74  Resp:  [14-30] 20  SpO2:  [86 %-99 %] 95 %  BP: (121-154)/(60-75) 134/67     Weight: 81.6 kg (180 lb)  Body mass index  is 32.92 kg/m².    Physical Exam   Constitutional: No distress.   HENT:   Head: Normocephalic and atraumatic.   Eyes: Pupils are equal, round, and reactive to light. EOM are normal.   Neck: Normal range of motion. No tracheal deviation present.   Cardiovascular: Normal rate and regular rhythm.   Pulmonary/Chest: She has wheezes. She has rales.   rales   Abdominal: Soft. Bowel sounds are normal.   Musculoskeletal: Normal range of motion. She exhibits no deformity.   Neurological: She is alert. She exhibits normal muscle tone.   Skin: Skin is warm. Capillary refill takes 2 to 3 seconds. She is not diaphoretic. No pallor.   Psychiatric: She has a normal mood and affect. Her behavior is normal.   Vitals reviewed.       Significant Labs: All pertinent labs within the past 24 hours have been reviewed.    Significant Imaging: I have reviewed and interpreted all pertinent imaging results/findings within the past 24 hours.    Assessment/Plan:     Respiratory distress  2/2 PNA and COPD exacerbation.  Lung mass also noted on CTA chest.  Given pt's risk and symptoms respiratory panel sent and health dept notified.  If resp panel neg, health dept rec'd COVID19 testing per ED physician.  For now continue CAP coverage, duonebs, +/- steroids.  If wheezing is mild today and air entry into lungs is acceptable I would DC steroids or give a very very short course given immunosuppression effects of steroids.      Also will consult pulmonology and oncology for lung mass.  Of note pt does have a h/o prior breast cancer.    CTA chest:   1. Right lower lobe 3.2 cm soft tissue mass concerning for malignancy.  Further evaluation is advised with PET/CT and/or biopsy.  2. Right lower lobe 2 cm posterior pleural based nodule versus focal consolidation.  3. Consolidation seen within the medial aspect of the right upper lobe which may reflect atelectasis and/or pneumonia.  4. Multifocal patchy opacities seen within the left lower lobe.  This is  nonspecific but may reflect multifocal infectious or inflammatory process.  No confluent consolidation seen in this region.  Some of these opacities demonstrates somewhat nodular configuration.          NICM (nonischemic cardiomyopathy)    Chronic, monitor.  Home diuretic resumed    Type 2 diabetes mellitus without complication  Accuchecks, SSI      COPD (chronic obstructive pulmonary disease)  As above      Essential hypertension  Resume home regimen      VTE Risk Mitigation (From admission, onward)         Ordered     enoxaparin injection 40 mg  Daily      03/12/20 0619     IP VTE HIGH RISK PATIENT  Once      03/12/20 0428     Place sequential compression device  Until discontinued      03/12/20 0428                 Art Faulkner MD - Family & Internal Medicine  [facility]  Ph: [phone]

## 2020-03-12 NOTE — HPI
64F with a h/o COPD/asthma, DM2, HTN, CMO, pacmaker placement, h/o breast cancer who presents to the ED with 2 weeks of worsening URI and fever and sob/wheezing. Reports symptoms similar to prior COPD exacerabation. Says her daughter works at [employer] (the one behind [employer]) and had been home off of work sick for last week with similar symptoms. Pt reports cough productive of phlegm and wheezing. Symptoms improving with duonebs. Says her daughter told NH that her mom was being tested for COVID, so they told her not to come to work. Pt very upset about being in hospital and not getting food. Asking to go home and says she's feeling better. Says she has home o2. apparantly lives alone, but daughter visits frequently.  reportedly passed away a few years ago. Quit tobacco.

## 2020-03-12 NOTE — PLAN OF CARE
03/12/20 1748   Discharge Assessment   Assessment Type Discharge Planning Assessment  (From Record)   Assessment information obtained from? Medical Record   Expected Length of Stay (days) 2   Communicated expected length of stay with patient/caregiver yes   Prior to hospitilization cognitive status: Alert/Oriented   Prior to hospitalization functional status: Independent   Current cognitive status: Alert/Oriented   Current Functional Status: Independent   Facility Arrived From: home   Able to Return to Prior Arrangements yes   Is patient able to care for self after discharge? Yes   Who are your caregiver(s) and their phone number(s)? daughter   Patient's perception of discharge disposition home or selfcare   Readmission Within the Last 30 Days no previous admission in last 30 days   Patient currently being followed by outpatient case management? No   Patient currently receives any other outside agency services? No   Equipment Currently Used at Home none   Part D Coverage [employer]   Does the patient have transportation home? Yes   Transportation Anticipated family or friend will provide   Dialysis Name and Scheduled days n/a   Does the patient receive services at the Coumadin Clinic? No   Discharge Plan A Home   DME Needed Upon Discharge  oxygen   Patient/Family in Agreement with Plan other (see comments)  (Completed from record.)   Does the patient have transportation to healthcare appointments? Yes

## 2020-03-12 NOTE — ASSESSMENT & PLAN NOTE
64F with a h/o COPD admitted with 2 weeks of worsening URI and fever and sob/wheezing. Daughter also sick and works in [place]. Fever at home, but tmax here 99.1. Hypoxic to 86%. Wheezing on exam. CT with RLL mass 3.2 c/f malignancy and also pneumonia. Flu neg    Agree with treating copd exacerbation. Suspect viral trigger. resp pcr panel pending. There is community spread of covid19 and pt has risk factor and sick contact and agree with sending test. Spoke with [facility] Epi hotline and they have authorized testing [place] LA [id number]. Discussed collection and form with pt's nurse Hali. Would only admit to hospital if needed for other reasons such as work up for malignancy. Otherwise, would tell her to self quarantine at home until testing returns. Pt should return to hospital if worsening respiratory symtpoms

## 2020-03-12 NOTE — ED NOTES
Received report from Alexandra ROBERTSON. Pt is resting at this time. No distress is noted. Pt has side rails up x2, call bell in reach. No family present at bedside. Will continue to be monitored.

## 2020-03-12 NOTE — ED NOTES
While patient on room air and ambulating to bathroom, O2 sats dropped to 86-87%.  Patient placed back on 2L NC.

## 2020-03-12 NOTE — ED NOTES
Home Oxygen Evaluation    Date Performed: 3/12/2020    1) Patient's Home O2 Sat on room air, while at rest: 88%        If O2 sats on room air at rest are 88% or below, patient qualifies. No additional testing needed. Document N/A in steps 2 and 3. If 89% or above, complete steps 2.      2) Patient's O2 Sat on room air while exercisin%        If O2 sats on room air while exercising remain 89% or above patient does not qualify, no further testing needed Document N/A in step 3. If O2 sats on room air while exercising are 88% or below, continue to step 3.      3) Patient's O2 Sat while exercising on O2: 92% on 2L NC.         (Must show improvement from #2 for patients to qualify)    If O2 sats improve on oxygen, patient qualifies for portable oxygen. If not, the patient does not qualify.

## 2020-03-12 NOTE — CARE UPDATE
Pt seen and examined by Dr. Faulkner this am. Pt admitted to hospital medicine with hypoxemia due to acute on chronic illness/COPD. Pt has Chest CTA with concern for soft tissue mass and bilateral infiltrates. Possible exposure to coronavirus. Pt has been swabbed for covid19. Plan to dc home and self-quarantine. Will update patient with test results.     For discharge the patient met criteria for home O2.    Patient's Home O2 Sat on room air, while at rest: 88%  )         Patient's O2 Sat on room air while exercisin%    Pt O2 sats improved to 92% on 2 liter NC.   Pt was given IV solumedrol, nebulized treatments x 4, anti-histamine, IV antibiotics, supportive care for cough- unable to wean from O2. Ok from Infectious disease to dc home on O2.     Pt was held in ED because there was no inpatient negative pressure room available.

## 2020-03-12 NOTE — HPI
Pt is a 63 yo F with a h/o COPD/asthma, DM2, HTN, CMO, pacmaker placement, h/o breast cancer who presents to the ED for SOB.  Pt did have a productive cough and wheezing as well.  She reported 10 day h/o nasal congestion/upper respiratory symptoms with green phlegm production.  She deneis any chest pain, hemoptysis, orthopnea, PND, dizziness, or palpitations.  Of note her daughter has been sick around her with Uri symptoms as well.  She has had poor appeteite and dec in weight recently.  In the ER she was found to be in COPD exacerbation, have PNA and new lung mass.

## 2020-03-12 NOTE — ED NOTES
Received report from AILYN Zaragoza.  Assumed care of patient.  Pt awake, alert and oriented.  Will continue to monitor.

## 2020-03-13 DIAGNOSIS — J44.1 COPD WITH ACUTE EXACERBATION: ICD-10-CM

## 2020-03-13 NOTE — DISCHARGE SUMMARY
[facility] Hospital Medicine  Discharge Summary      Patient Name: Hannah Montoya  MRN: [id number]  Admission Date: 3/11/2020  Hospital Length of Stay: 1 days  Discharge Date and Time: 3/12/20  Attending Physician: No att. providers found   Discharging Provider: Deja Doll MD  Primary Care Provider: Emanuel Chavez MD      HPI:   Pt is a 65 yo F with a h/o COPD/asthma, DM2, HTN, CMO, pacmaker placement, h/o breast cancer who presents to the ED for SOB.  Pt did have a productive cough and wheezing as well.  She reported 10 day h/o nasal congestion/upper respiratory symptoms with green phlegm production.  She deneis any chest pain, hemoptysis, orthopnea, PND, dizziness, or palpitations.  Of note her daughter has been sick around her with Uri symptoms as well.  She has had poor appeteite and dec in weight recently.  In the ER she was found to be in COPD exacerbation, have PNA and new lung mass.         * No surgery found *      Hospital Course:   Pt admitted with flu like symptoms and SOB. Influenza negative. Possible covid19 exposure with other sick contacts at home. Of note, patient had chest CT which suggest new lung mass and bilateral infiltrates. ID consulted. Patient swabbed and labs pending. Pt will self-quarantine at home. o2 saturation 86% on RA with ambulation. Pt has COPD and will be sent home with O2, SW assisting. Test results will be called to patient.      Consults:   Consults (From admission, onward)        Status Ordering Provider     Inpatient consult to Infectious Diseases  Once     Provider:  (Not yet assigned)    LAURA Langley new Assessment & Plan notes have been filed under this hospital service since the last note was generated.  Service: Hospital Medicine    Final Active Diagnoses:    Diagnosis Date Noted POA    PRINCIPAL PROBLEM:  Hypoxia [R09.02] 03/12/2020 Yes    Pneumonia due to infectious organism [J18.9] 03/12/2020 Yes    Lung mass  "[R91.8] 03/12/2020 Yes    COPD exacerbation [J44.1] 03/12/2020 Yes    NICM (nonischemic cardiomyopathy) [I42.8] 05/02/2018 Yes    Type 2 diabetes mellitus without complication [E11.9] 07/05/2016 Yes    COPD (chronic obstructive pulmonary disease) [J44.9] 04/24/2015 Yes    Essential hypertension [I10] 10/07/2014 Yes      Problems Resolved During this Admission:    Diagnosis Date Noted Date Resolved POA    Respiratory distress [R06.03] 03/12/2020 03/12/2020 Yes       Discharged Condition: good    Disposition: Home or Self Care    Follow Up:  Follow-up Information     Emanuel Chavez MD.    Specialties:  Family Medicine, Wound Care  Why:  As needed, return to ED with worsening SOB on oxygen   Contact information:  [address] [facility] [address]  [phone]             Ariel Baltazar MD. Go in 2 weeks.    Specialty:  Pulmonary Disease  Why:  follow up lung mass, COPD, possibel viral pneumonia   Contact information:  [address] [place] [address]  [facility] [address] [phone]             [facility]    Specialty:  DME Provider  Why:  DME-Will deliver your oxygen concentrator to the home.  Contact information:  [address] [place] [address] [phone]                 Patient Instructions:      OXYGEN FOR HOME USE     Order Specific Question Answer Comments   Liter Flow 2    Duration With activity    Qualifying SpO2: 86%    Testing done at: Exercise/Activity    Route nasal cannula    Portable mode: continuous    Device home concentrator with portable unit    Length of need (in months): 99 mos    Patient condition with qualifying saturation COPD    Height: 5' 2" (1.575 m)    Weight: 81.6 kg (180 lb)    Does patient have medical equipment at home? none    Alternative treatment measures have been tried or considered and deemed clinically ineffective. Yes    Vendor: [facility]    Expected Date of Delivery: 3/12/2020      Diet Cardiac     Diet diabetic     Notify your health " care provider if you experience any of the following:  temperature >100.4     Notify your health care provider if you experience any of the following:  persistent nausea and vomiting or diarrhea     Notify your health care provider if you experience any of the following:  severe uncontrolled pain     Notify your health care provider if you experience any of the following:  difficulty breathing or increased cough     Activity as tolerated       Significant Diagnostic Studies:  Chest CT:  Impression       1. Right lower lobe 3.2 cm soft tissue mass concerning for malignancy.  Further evaluation is advised with PET/CT and/or biopsy.  2. Right lower lobe 2 cm posterior pleural based nodule versus focal consolidation.  3. Consolidation seen within the medial aspect of the right upper lobe which may reflect atelectasis and/or pneumonia.  4. Multifocal patchy opacities seen within the left lower lobe.  This is nonspecific but may reflect multifocal infectious or inflammatory process.  No confluent consolidation seen in this region.  Some of these opacities demonstrates somewhat nodular configuration.  This report was flagged in Epic as abnormal.         Pending Diagnostic Studies:     None         Medications:  Reconciled Home Medications:      Medication List      START taking these medications    amoxicillin-clavulanate 875-125mg 875-125 mg per tablet  Commonly known as:  AUGMENTIN  Take 1 tablet by mouth every 12 (twelve) hours. for 7 days        CHANGE how you take these medications    fluticasone propionate 50 mcg/actuation nasal spray  Commonly known as:  FLONASE  1 spray (50 mcg total) by Each Nare route once daily.  What changed:  Another medication with the same name was removed. Continue taking this medication, and follow the directions you see here.     lisinopriL 10 MG tablet  Take 1 tablet (10 mg total) by mouth once daily.  What changed:    · when to take this  · reasons to take this     predniSONE 20 MG  tablet  Commonly known as:  DELTASONE  Take 1 tablet (20 mg total) by mouth once daily.  What changed:  how much to take        CONTINUE taking these medications    albuterol sulfate 90 mcg/actuation Aepb  Commonly known as:  PROAIR RESPICLICK  Inhale 2 puffs into the lungs every 4 (four) hours as needed. Rescue     albuterol-ipratropium 2.5 mg-0.5 mg/3 mL nebulizer solution  Commonly known as:  DUO-NEB  Use 1 vial IN NEBULIZER EVERY 6 HOURS AS NEEDED FOR FOR WHEEZING     atorvastatin 10 MG tablet  Commonly known as:  LIPITOR  Take 1 tablet (10 mg total) by mouth once daily.     carvediloL 25 MG tablet  Commonly known as:  COREG  TAKE 1 TABLET(25 MG) BY MOUTH TWICE DAILY     cetirizine 10 MG tablet  Commonly known as:  ZYRTEC  Take 10 mg by mouth every evening.     fluticasone-salmeterol 250-50 mcg/dose 250-50 mcg/dose diskus inhaler  Commonly known as:  ADVAIR  INHALE 1 PUFF BY MOUTH TWICE DAILY     furosemide 40 MG tablet  Commonly known as:  LASIX  TAKE 1 TABLET(40 MG) BY MOUTH TWICE DAILY     ibuprofen 800 MG tablet  Commonly known as:  ADVIL,MOTRIN  Take 1 tablet (800 mg total) by mouth every 8 (eight) hours as needed for Pain.     metFORMIN 500 MG tablet  Commonly known as:  GLUCOPHAGE  TAKE ONE TABLET BY MOUTH TWICE DAILY     ONE DAILY MULTIVITAMIN per tablet  Generic drug:  multivitamin  Take 1 tablet by mouth once daily.     sertraline 100 MG tablet  Commonly known as:  ZOLOFT  TAKE ONE TABLET BY MOUTH EVERY MORNING     TRUETEST TEST STRIPS Strp  Generic drug:  blood sugar diagnostic  test once EVERY MORNING        STOP taking these medications    doxycycline 100 MG tablet  Commonly known as:  VIBRA-TABS            Indwelling Lines/Drains at time of discharge:   Lines/Drains/Airways     None                 Time spent on the discharge of patient: 60 minutes  Patient was seen and examined on the date of discharge and determined to be suitable for discharge.         Deja Doll MD  Department of Hospital  Medicine  [facility]

## 2020-03-13 NOTE — HOSPITAL COURSE
Pt admitted with flu like symptoms and SOB. Influenza negative. Possible covid19 exposure with other sick contacts at home. Of note, patient had chest CT which suggest new lung mass and bilateral infiltrates. ID consulted. Patient swabbed and labs pending. Pt will self-quarantine at home. o2 saturation 86% on RA with ambulation. Pt has COPD and will be sent home with O2, SW assisting. Test results will be called to patient.

## 2020-03-13 NOTE — PROGRESS NOTES
Post discharge f/u note: [facility] attempted to deliver O2 unit to patient's home on 3/12/20.  Patient refused unit, stating that she had access to O2 unit--Per Chapis with [facility].  Chapis will f/u with patient to determine if she accepted portable O2 unit at discharge.    Carlie Whelan [place] [facility]  3/13/2020

## 2020-03-13 NOTE — PLAN OF CARE
Late Entry       03/13/20 0951   Final Note   Assessment Type Final Discharge Note  (Discharged to home 3/12/20)   Anticipated Discharge Disposition Home   Hospital Follow Up  Appt(s) scheduled? Yes   Discharge plans and expectations educations in teach back method with documentation complete? Yes  (By Nursing staff)   Right Care Referral Info   Post Acute Recommendation No Care

## 2020-03-14 RX ORDER — PREDNISONE 20 MG/1
TABLET ORAL
Qty: 15 TABLET | Refills: 0 | Status: SHIPPED | OUTPATIENT
Start: 2020-03-14 | End: 2020-05-26 | Stop reason: CLARIF

## 2020-03-15 DIAGNOSIS — J44.1 COPD EXACERBATION: ICD-10-CM

## 2020-03-15 DIAGNOSIS — J44.9 CHRONIC OBSTRUCTIVE PULMONARY DISEASE, UNSPECIFIED COPD TYPE: ICD-10-CM

## 2020-03-15 RX ORDER — METFORMIN HYDROCHLORIDE 500 MG/1
TABLET ORAL
Qty: 60 TABLET | Refills: 0 | Status: SHIPPED | OUTPATIENT
Start: 2020-03-15 | End: 2020-03-25 | Stop reason: SDUPTHER

## 2020-03-16 ENCOUNTER — PATIENT MESSAGE (OUTPATIENT)
Dept: FAMILY MEDICINE | Facility: CLINIC | Age: 65
End: 2020-03-16

## 2020-03-16 LAB
BACTERIA BLD CULT: NORMAL
BACTERIA BLD CULT: NORMAL

## 2020-03-16 RX ORDER — IPRATROPIUM BROMIDE AND ALBUTEROL SULFATE 2.5; .5 MG/3ML; MG/3ML
3 SOLUTION RESPIRATORY (INHALATION)
Qty: 30 ML | Refills: 5 | Status: SHIPPED | OUTPATIENT
Start: 2020-03-16 | End: 2021-03-08

## 2020-03-16 NOTE — TELEPHONE ENCOUNTER
----- Message from Luz Dash sent at 3/16/2020  7:52 AM CDT -----  Contact: Hannah [phone]  Type: Patient Call Back    Who called:Hannah    What is the request in detail: The patient is requesting a call back from the staff. She stated that she is awaiting her covid19 results and they were negative. But she needs some type of letter stating this, for her daughter, so that she can go back to work. The patient stated that she has double pneumonia and is in her house. She'd like something emailed to her or on [facility]     Can the clinic reply by [facility]?no    Would the patient rather a call back or a response via [facility]? Call back     Best call back number:[phone]

## 2020-03-21 ENCOUNTER — PATIENT MESSAGE (OUTPATIENT)
Dept: ADMINISTRATIVE | Facility: OTHER | Age: 65
End: 2020-03-21

## 2020-03-23 DIAGNOSIS — E11.9 TYPE 2 DIABETES MELLITUS: ICD-10-CM

## 2020-03-25 RX ORDER — METFORMIN HYDROCHLORIDE 500 MG/1
500 TABLET ORAL 2 TIMES DAILY
Qty: 60 TABLET | Refills: 0 | Status: SHIPPED | OUTPATIENT
Start: 2020-03-25 | End: 2020-04-08

## 2020-03-25 RX ORDER — CARVEDILOL 25 MG/1
TABLET ORAL
Qty: 180 TABLET | Refills: 3 | Status: SHIPPED | OUTPATIENT
Start: 2020-03-25 | End: 2020-03-25

## 2020-03-25 RX ORDER — FUROSEMIDE 40 MG/1
TABLET ORAL
Qty: 180 TABLET | Refills: 3 | Status: SHIPPED | OUTPATIENT
Start: 2020-03-25 | End: 2020-03-25

## 2020-03-25 RX ORDER — FUROSEMIDE 40 MG/1
TABLET ORAL
Qty: 180 TABLET | Refills: 3 | Status: SHIPPED | OUTPATIENT
Start: 2020-03-25 | End: 2020-08-03 | Stop reason: SDUPTHER

## 2020-03-25 RX ORDER — CARVEDILOL 25 MG/1
TABLET ORAL
Qty: 180 TABLET | Refills: 3 | Status: SHIPPED | OUTPATIENT
Start: 2020-03-25 | End: 2020-08-03 | Stop reason: SDUPTHER

## 2020-03-27 RX ORDER — SERTRALINE HYDROCHLORIDE 100 MG/1
TABLET, FILM COATED ORAL
Qty: 30 TABLET | Refills: 5 | Status: SHIPPED | OUTPATIENT
Start: 2020-03-27 | End: 2020-08-03 | Stop reason: SDUPTHER

## 2020-04-04 ENCOUNTER — CLINICAL SUPPORT (OUTPATIENT)
Dept: CARDIOLOGY | Facility: HOSPITAL | Age: 65
End: 2020-04-04
Payer: COMMERCIAL

## 2020-04-04 DIAGNOSIS — Z95.810 PRESENCE OF AUTOMATIC (IMPLANTABLE) CARDIAC DEFIBRILLATOR: ICD-10-CM

## 2020-04-04 DIAGNOSIS — I42.5 OTHER RESTRICTIVE CARDIOMYOPATHY: ICD-10-CM

## 2020-04-04 PROCEDURE — 93295 DEV INTERROG REMOTE 1/2/MLT: CPT | Mod: ,,, | Performed by: INTERNAL MEDICINE

## 2020-04-04 PROCEDURE — 93296 REM INTERROG EVL PM/IDS: CPT | Performed by: INTERNAL MEDICINE

## 2020-04-04 PROCEDURE — 93295 CARDIAC DEVICE CHECK - REMOTE: ICD-10-PCS | Mod: ,,, | Performed by: INTERNAL MEDICINE

## 2020-04-08 ENCOUNTER — PATIENT MESSAGE (OUTPATIENT)
Dept: FAMILY MEDICINE | Facility: CLINIC | Age: 65
End: 2020-04-08

## 2020-04-08 RX ORDER — METFORMIN HYDROCHLORIDE 500 MG/1
TABLET ORAL
Qty: 60 TABLET | Refills: 0 | Status: SHIPPED | OUTPATIENT
Start: 2020-04-08 | End: 2020-05-06

## 2020-04-09 ENCOUNTER — PATIENT MESSAGE (OUTPATIENT)
Dept: FAMILY MEDICINE | Facility: CLINIC | Age: 65
End: 2020-04-09

## 2020-04-17 DIAGNOSIS — J44.1 COPD WITH ACUTE EXACERBATION: ICD-10-CM

## 2020-04-17 RX ORDER — FLUTICASONE PROPIONATE AND SALMETEROL 250; 50 UG/1; UG/1
1 POWDER RESPIRATORY (INHALATION) 2 TIMES DAILY
Qty: 60 EACH | Refills: 0 | Status: SHIPPED | OUTPATIENT
Start: 2020-04-17 | End: 2020-05-12

## 2020-05-06 RX ORDER — METFORMIN HYDROCHLORIDE 500 MG/1
TABLET ORAL
Qty: 60 TABLET | Refills: 0 | Status: SHIPPED | OUTPATIENT
Start: 2020-05-06 | End: 2020-05-29

## 2020-05-07 ENCOUNTER — PATIENT MESSAGE (OUTPATIENT)
Dept: FAMILY MEDICINE | Facility: CLINIC | Age: 65
End: 2020-05-07

## 2020-05-07 DIAGNOSIS — R91.8 LUNG MASS: Primary | ICD-10-CM

## 2020-05-07 DIAGNOSIS — Z12.11 COLON CANCER SCREENING: ICD-10-CM

## 2020-05-08 ENCOUNTER — PATIENT OUTREACH (OUTPATIENT)
Dept: ADMINISTRATIVE | Facility: OTHER | Age: 65
End: 2020-05-08

## 2020-05-08 ENCOUNTER — OFFICE VISIT (OUTPATIENT)
Dept: PULMONOLOGY | Facility: CLINIC | Age: 65
End: 2020-05-08
Payer: COMMERCIAL

## 2020-05-08 ENCOUNTER — TELEPHONE (OUTPATIENT)
Dept: PULMONOLOGY | Facility: CLINIC | Age: 65
End: 2020-05-08

## 2020-05-08 VITALS
WEIGHT: 182.88 LBS | SYSTOLIC BLOOD PRESSURE: 117 MMHG | OXYGEN SATURATION: 91 % | BODY MASS INDEX: 33.45 KG/M2 | HEART RATE: 83 BPM | DIASTOLIC BLOOD PRESSURE: 63 MMHG

## 2020-05-08 DIAGNOSIS — R06.02 SOB (SHORTNESS OF BREATH): Primary | ICD-10-CM

## 2020-05-08 DIAGNOSIS — R91.8 LUNG MASS: ICD-10-CM

## 2020-05-08 DIAGNOSIS — J41.0 SIMPLE CHRONIC BRONCHITIS: ICD-10-CM

## 2020-05-08 DIAGNOSIS — R91.1 LUNG NODULE: ICD-10-CM

## 2020-05-08 PROBLEM — J44.1 COPD EXACERBATION: Status: RESOLVED | Noted: 2020-03-12 | Resolved: 2020-05-08

## 2020-05-08 PROCEDURE — 99244 OFF/OP CNSLTJ NEW/EST MOD 40: CPT | Mod: S$GLB,,, | Performed by: INTERNAL MEDICINE

## 2020-05-08 PROCEDURE — 99999 PR PBB SHADOW E&M-EST. PATIENT-LVL III: CPT | Mod: PBBFAC,,, | Performed by: INTERNAL MEDICINE

## 2020-05-08 PROCEDURE — 99244 PR OFFICE CONSULTATION,LEVEL IV: ICD-10-PCS | Mod: S$GLB,,, | Performed by: INTERNAL MEDICINE

## 2020-05-08 PROCEDURE — 99999 PR PBB SHADOW E&M-EST. PATIENT-LVL III: ICD-10-PCS | Mod: PBBFAC,,, | Performed by: INTERNAL MEDICINE

## 2020-05-08 NOTE — LETTER
May 8, 2020      Emanuel Chavez, PT  [address]  [facility] [address]           [facility]  [address]  [place] [address]  Phone: [phone]  Fax: [phone]          Patient: Hannah Montoya   MR Number: [id number]   YOB: 1955   Date of Visit: 5/8/2020       Dear Emanuel Page:    Thank you for referring Hannah Montoya to me for evaluation. Attached you will find relevant portions of my assessment and plan of care.    If you have questions, please do not hesitate to call me. I look forward to following Hannah Montoya along with you.    Sincerely,    Lang Cannon MD    Enclosure  CC:  No Recipients    If you would like to receive this communication electronically, please contact [email or web address] or [phone] to request more information on [facility] access.    For providers and/or their staff who would like to refer a patient to [facility], please contact us through our one-stop-shop provider referral line, [facility], at [phone].    If you feel you have received this communication in error or would no longer like to receive these types of communications, please e-mail [email or web address]

## 2020-05-08 NOTE — PROGRESS NOTES
Hannah Montoya  was seen as a new patient at the request  Page, DAVID Castellanos for the evaluation of  Abnormal ct of chest.    CHIEF COMPLAINT:  Hospital Follow Up      HISTORY OF PRESENT ILLNESS: Hannah Montoya is a 64 y.o. female  has a past medical history of AICD (automatic cardioverter/defibrillator) present, Asthma, Breast cancer (2016), Cardiac pacemaker, Cardiomyopathy, COPD (chronic obstructive pulmonary disease), Diabetes mellitus, type 2, Hyperglycemia, Hyperlipidemia, Hypertension, Malignant neoplasm of overlapping sites of female breast (2/12/2016), and Respiratory distress (3/12/2020).  Patient smoked .5 ppd x 40 years.  Patient quit in 2016.  Patient presented to ED 3/11/20 with sob, nasal congestion.  covid 19 was negative. Patient was treated with antibiotics and prednisone with improvement in symptoms.  Ct mentioned 3.2 cm rll mass.      Today, patient denied fever/chills.  No coughing.  No hemoptysis.  9 lbs weight loss.  Poor appetite in which patient is attributed to stress of social distancing.  In addition, patient is active with house choir without issue.  No chest pain.  No orthopnea.  No pnd.     PAST MEDICAL HISTORY:    Active Ambulatory Problems     Diagnosis Date Noted    Essential hypertension 10/07/2014    COPD (chronic obstructive pulmonary disease) 04/24/2015    Mild protein malnutrition 07/05/2016    Elevated troponin 07/05/2016    Type 2 diabetes mellitus without complication 07/05/2016    Seasonal allergies 03/23/2017    Left bundle-branch block 08/14/2017    Dilated cardiomyopathy 04/03/2018    NICM (nonischemic cardiomyopathy) 05/02/2018    Malfunction of implantable defibrillator ventricular (ICD) lead 06/15/2018    Pain, dental 03/17/2019    Cardiac resynchronization therapy defibrillator (CRT-D) in place 09/30/2019    History of breast cancer in female 02/03/2020    Hypoxia 03/12/2020    Pneumonia due to infectious organism 03/12/2020    Lung mass  2020     Resolved Ambulatory Problems     Diagnosis Date Noted    Tobacco abuse 2015    Malignant neoplasm of overlapping sites of female breast 2016    Breast CA 2016    Acute exacerbation of chronic obstructive pulmonary disease (COPD) 2016    Acute respiratory failure with hypoxia and hypercapnia 2016    Metabolic acidosis 2016    Sepsis 2016    Acute diastolic congestive heart failure 2017    Dehydration     Acute systolic congestive heart failure 2017    Respiratory distress 2020    COPD exacerbation 2020     Past Medical History:   Diagnosis Date    AICD (automatic cardioverter/defibrillator) present     Asthma     Breast cancer     Cardiac pacemaker     Cardiomyopathy     Diabetes mellitus, type 2     Hyperglycemia     Hyperlipidemia     Hypertension                 PAST SURGICAL HISTORY:    Past Surgical History:   Procedure Laterality Date    BREAST BIOPSY Right 2016    [facility]    BREAST LUMPECTOMY Right     CARDIAC CATHETERIZATION Bilateral 2017    CARDIAC DEFIBRILLATOR PLACEMENT Left 08/10/2017    CARDIAC DEFIBRILLATOR PLACEMENT Left 2018     SECTION      x2    CHOLECYSTECTOMY      REVISION OF IMPLANTABLE CARDIOVERTER-DEFIBRILLATOR (ICD) ELECTRODE LEAD PLACEMENT N/A 6/15/2018    Procedure: REVISION-LEAD-ICD;  Surgeon: Javy Gates MD;  Location: [facility] CATH LAB;  Service: Cardiology;  Laterality: N/A;  LBBB, Bi-V ICD HIS Ld rev, MDT, Choice, MB, 3 Prep    TUBAL LIGATION           FAMILY HISTORY:                Family History   Problem Relation Age of Onset    Kidney disease Mother     Kidney disease Father     Clotting disorder Brother     Breast cancer Neg Hx     Ovarian cancer Neg Hx        SOCIAL HISTORY:          Tobacco:   Social History     Tobacco Use   Smoking Status Former Smoker    Packs/day: 0.50    Years: 40.00    Pack years: 20.00    Types: Cigarettes     Last attempt to quit: 8/1/2016    Years since quitting: 3.7   Smokeless Tobacco Never Used     alcohol use:    Social History     Substance and Sexual Activity   Alcohol Use Yes    Frequency: Monthly or less    Drinks per session: 1 or 2    Binge frequency: Never    Comment: rare- holiday               Occupation:  Former     ALLERGIES:    Review of patient's allergies indicates:   Allergen Reactions    Adhesive Rash       CURRENT MEDICATIONS:    Current Outpatient Medications   Medication Sig Dispense Refill    albuterol sulfate (PROAIR RESPICLICK) 90 mcg/actuation AePB Inhale 2 puffs into the lungs every 4 (four) hours as needed. Rescue 1 each 5    albuterol-ipratropium (DUO-NEB) 2.5 mg-0.5 mg/3 mL nebulizer solution Take 3 mLs by nebulization every 6 (six) hours while awake. Rescue 30 mL 5    atorvastatin (LIPITOR) 10 MG tablet Take 1 tablet (10 mg total) by mouth once daily. 90 tablet 3    carvediloL (COREG) 25 MG tablet TAKE 1 TABLET(25 MG) BY MOUTH TWICE DAILY 180 tablet 3    cetirizine (ZYRTEC) 10 MG tablet Take 10 mg by mouth every evening.       fluticasone propionate (FLONASE) 50 mcg/actuation nasal spray 1 spray (50 mcg total) by Each Nare route once daily. 15.8 mL 0    fluticasone-salmeterol diskus inhaler 250-50 mcg Inhale 1 puff into the lungs 2 (two) times daily. Controller 60 each 0    furosemide (LASIX) 40 MG tablet TAKE 1 TABLET(40 MG) BY MOUTH TWICE DAILY 180 tablet 3    ibuprofen (ADVIL,MOTRIN) 800 MG tablet Take 1 tablet (800 mg total) by mouth every 8 (eight) hours as needed for Pain. (Patient not taking: Reported on 10/1/2019) 30 tablet 0    lisinopril 10 MG tablet Take 1 tablet (10 mg total) by mouth once daily. (Patient taking differently: Take 10 mg by mouth as needed. ) 90 tablet 3    metFORMIN (GLUCOPHAGE) 500 MG tablet TAKE 1 TABLET BY MOUTH TWICE A DAY 60 tablet 0    multivitamin (ONE DAILY MULTIVITAMIN) per tablet Take 1 tablet by mouth once daily.       predniSONE (DELTASONE) 20 MG tablet TAKE THREE TABLETS BY MOUTH ONCE DAILY 15 tablet 0    sertraline (ZOLOFT) 100 MG tablet TAKE ONE TABLET BY MOUTH EVERY MORNING 30 tablet 5    TRUETEST TEST STRIPS Strp test once EVERY MORNING 100 strip 1     No current facility-administered medications for this visit.                   REVIEW OF SYSTEMS:     Pulmonary related symptoms as per HPI.  Gen:  + weight loss, no fever, no night sweat  HEENT:  no visual changes, no sore throat, no hearing loss  CV:  No chest pain, no orthopnea, no PND  GI:  no melena, no hematochezia, no diarhea, no constipation.  :  no dysuria, no hematuria, no hesistancy, no dribbling  Neuro:  no syncope, no vertigo, no tinitus  Psych:  No homocide or suicide ideation; no depression.  Endocrine:  No heat or cold intolerance.  Sleep:  No snoring; no witnessed apnea.  Feeling rested upon awake  Otherwise, a balance of systems reviewed is negative.          PHYSICAL EXAM:  Vitals:    05/08/20 0837   BP: 117/63   Pulse: 83   SpO2: (!) 91%   Weight: 83 kg (182 lb 14 oz)   PainSc: 0-No pain     Body mass index is 33.45 kg/m².     GENERAL:  well develop; no apparent distress  HEENT:  no nasal congestion; no discharge noted; class 3 modified mallampatti.   NECK:  supple; no palpable masses.  CARDIO: regular rate and rhythm  PULM:  clear to auscultation bilaterally; no intercostals retractions; no accessory muscle usage   ABDOMEN:  soft nontender/nondistended.  +bowel sound  EXTREMITIES no cce  NEURO:  CN II-XII intact.  5/5 motor in all extremities.  sensation grossly intact   to light touch.  PSYCH:  normal affect.  Alert and oriented x 4    LABS  Pulmonary Functions Testing Results(personally reviewed):  5/8/05 ratio 54%; fvc 79%; fev1 58%; tlc 103%; dlco 74%  ABG (personally reviewed):  none  CXR (personally reviewed):  3/11/20 rll and right perihilar hyperattenuation  CT CHEST(personally reviewed):  3/11/20 rll 3.2 lobular soft tissue density; rml  atelectasis.  New when compared to 2016 ct chest.      sars 3/12/20 negative    Echo 10/16/18  CONCLUSIONS     1 - Low normal to mildly depressed left ventricular systolic function (EF 50-55%).     2 - Wall motion abnormalities.     3 - Normal left ventricular diastolic function.     4 - Normal right ventricular systolic function .     5 - Trivial aortic regurgitation.     6 - Trivial to mild mitral regurgitation.     7 - Trivial tricuspid regurgitation.     8 - Trivial pulmonic regurgitation.     9 - The estimated PA systolic pressure is 31 mmHg.     ASSESSMENT/PLAN  Problem List Items Addressed This Visit     COPD (chronic obstructive pulmonary disease)    Overview     advair and duonebs.  Well control.           Relevant Orders    COVID-19 Routine Screening    Lung mass    Overview     3.2 rll cm mass and rml atelectasis.  ddx infection vs malignancy         Current Assessment & Plan     Ct guided biopsy if lung mass persist.           Relevant Orders    Complete PFT with bronchodilator    COVID-19 Routine Screening    COVID-19 Routine Screening      Other Visit Diagnoses     Lung nodule        Relevant Orders    CT Chest Without Contrast          chf - EF improved 20%-50%      patient will No follow-ups on file. with md/np.    CC: Send copy of this note to Emanuel Chavez, PT

## 2020-05-12 ENCOUNTER — LAB VISIT (OUTPATIENT)
Dept: FAMILY MEDICINE | Facility: CLINIC | Age: 65
End: 2020-05-12
Payer: COMMERCIAL

## 2020-05-12 DIAGNOSIS — J41.0 SIMPLE CHRONIC BRONCHITIS: ICD-10-CM

## 2020-05-12 DIAGNOSIS — J44.1 COPD WITH ACUTE EXACERBATION: ICD-10-CM

## 2020-05-12 DIAGNOSIS — R91.8 LUNG MASS: ICD-10-CM

## 2020-05-12 PROCEDURE — U0002 COVID-19 LAB TEST NON-CDC: HCPCS

## 2020-05-12 RX ORDER — FLUTICASONE PROPIONATE AND SALMETEROL 250; 50 UG/1; UG/1
POWDER RESPIRATORY (INHALATION)
Qty: 60 EACH | Refills: 5 | Status: SHIPPED | OUTPATIENT
Start: 2020-05-12 | End: 2020-08-20 | Stop reason: SDUPTHER

## 2020-05-13 ENCOUNTER — PATIENT MESSAGE (OUTPATIENT)
Dept: FAMILY MEDICINE | Facility: CLINIC | Age: 65
End: 2020-05-13

## 2020-05-13 LAB — SARS-COV-2 RNA RESP QL NAA+PROBE: NOT DETECTED

## 2020-05-14 ENCOUNTER — HOSPITAL ENCOUNTER (OUTPATIENT)
Dept: RADIOLOGY | Facility: HOSPITAL | Age: 65
Discharge: HOME | End: 2020-05-14
Attending: INTERNAL MEDICINE
Payer: COMMERCIAL

## 2020-05-14 ENCOUNTER — HOSPITAL ENCOUNTER (OUTPATIENT)
Dept: RESPIRATORY THERAPY | Facility: HOSPITAL | Age: 65
Discharge: HOME | End: 2020-05-14
Attending: INTERNAL MEDICINE
Payer: COMMERCIAL

## 2020-05-14 VITALS — RESPIRATION RATE: 18 BRPM | HEART RATE: 84 BPM | OXYGEN SATURATION: 99 %

## 2020-05-14 DIAGNOSIS — R91.1 LUNG NODULE: ICD-10-CM

## 2020-05-14 DIAGNOSIS — R91.8 LUNG MASS: ICD-10-CM

## 2020-05-14 LAB
BRPFT: ABNORMAL
DLCO ADJ PRE: 17.82 ML/(MIN*MMHG) (ref 15.36–26.83)
DLCO SINGLE BREATH LLN: 15.36
DLCO SINGLE BREATH PRE REF: 84.5 %
DLCO SINGLE BREATH REF: 21.09
DLCOC SBVA LLN: 3.01
DLCOC SBVA PRE REF: 80.6 %
DLCOC SBVA REF: 4.61
DLCOC SINGLE BREATH LLN: 15.36
DLCOC SINGLE BREATH PRE REF: 84.5 %
DLCOC SINGLE BREATH REF: 21.09
DLCOVA LLN: 3.01
DLCOVA PRE REF: 80.6 %
DLCOVA PRE: 3.72 ML/(MIN*MMHG*L) (ref 3.01–6.22)
DLCOVA REF: 4.61
DLVAADJ PRE: 3.72 ML/(MIN*MMHG*L) (ref 3.01–6.22)
ERVN2 LLN: -16449.25
ERVN2 PRE REF: 80.5 %
ERVN2 PRE: 0.6 L (ref -16449.25–16450.75)
ERVN2 REF: 0.75
FEF 25 75 CHG: 26.1 %
FEF 25 75 LLN: 1.02
FEF 25 75 POST REF: 23.1 %
FEF 25 75 PRE REF: 18.3 %
FEF 25 75 REF: 2.05
FET100 CHG: 42 %
FEV1 CHG: 22.3 %
FEV1 FVC CHG: 1.5 %
FEV1 FVC LLN: 67
FEV1 FVC POST REF: 67.5 %
FEV1 FVC PRE REF: 66.5 %
FEV1 FVC REF: 79
FEV1 LLN: 1.68
FEV1 POST REF: 53.8 %
FEV1 PRE REF: 44 %
FEV1 REF: 2.25
FRCN2 LLN: 1.76
FRCN2 PRE REF: 109 %
FRCN2 REF: 2.58
FVC CHG: 20.5 %
FVC LLN: 2.14
FVC POST REF: 79.3 %
FVC PRE REF: 65.8 %
FVC REF: 2.85
IVC PRE: 2.28 L (ref 2.14–3.56)
IVC SINGLE BREATH LLN: 2.14
IVC SINGLE BREATH PRE REF: 80 %
IVC SINGLE BREATH REF: 2.85
PEF CHG: 5.1 %
PEF LLN: 4.25
PEF POST REF: 53.2 %
PEF PRE REF: 50.6 %
PEF REF: 5.85
POST FEF 25 75: 0.47 L/S (ref 1.02–3.08)
POST FET 100: 14.18 SEC
POST FEV1 FVC: 53.56 % (ref 66.94–91.7)
POST FEV1: 1.21 L (ref 1.68–2.81)
POST FVC: 2.26 L (ref 2.14–3.56)
POST PEF: 3.11 L/S (ref 4.25–7.45)
PRE DLCO: 17.82 ML/(MIN*MMHG) (ref 15.36–26.83)
PRE FEF 25 75: 0.38 L/S (ref 1.02–3.08)
PRE FET 100: 9.98 SEC
PRE FEV1 FVC: 52.75 % (ref 66.94–91.7)
PRE FEV1: 0.99 L (ref 1.68–2.81)
PRE FRC N2: 2.81 L
PRE FVC: 1.88 L (ref 2.14–3.56)
PRE PEF: 2.96 L/S (ref 4.25–7.45)
RVN2 LLN: 1.26
RVN2 PRE REF: 120.5 %
RVN2 PRE: 2.21 L (ref 1.26–2.41)
RVN2 REF: 1.83
RVN2TLCN2 LLN: 30.45
RVN2TLCN2 PRE REF: 130.8 %
RVN2TLCN2 PRE: 52.37 % (ref 30.45–49.63)
RVN2TLCN2 REF: 40.04
TLCN2 LLN: 3.59
TLCN2 PRE REF: 92.3 %
TLCN2 PRE: 4.22 L (ref 3.58–5.56)
TLCN2 REF: 4.57
VA PRE: 4.8 L (ref 4.42–4.42)
VA SINGLE BREATH LLN: 4.42
VA SINGLE BREATH PRE REF: 108.6 %
VA SINGLE BREATH REF: 4.42
VCMAXN2 LLN: 2.14
VCMAXN2 PRE REF: 70.5 %
VCMAXN2 PRE: 2.01 L (ref 2.14–3.56)
VCMAXN2 REF: 2.85

## 2020-05-14 PROCEDURE — 94729 DIFFUSING CAPACITY: CPT | Mod: 26,,, | Performed by: INTERNAL MEDICINE

## 2020-05-14 PROCEDURE — 71250 CT CHEST WITHOUT CONTRAST: ICD-10-PCS | Mod: 26,,, | Performed by: RADIOLOGY

## 2020-05-14 PROCEDURE — 94729 DIFFUSING CAPACITY: CPT

## 2020-05-14 PROCEDURE — 94727 GAS DIL/WSHOT DETER LNG VOL: CPT | Mod: 26,,, | Performed by: INTERNAL MEDICINE

## 2020-05-14 PROCEDURE — 71250 CT THORAX DX C-: CPT | Mod: 26,,, | Performed by: RADIOLOGY

## 2020-05-14 PROCEDURE — 25000242 PHARM REV CODE 250 ALT 637 W/ HCPCS: Performed by: INTERNAL MEDICINE

## 2020-05-14 PROCEDURE — 94727 GAS DIL/WSHOT DETER LNG VOL: CPT

## 2020-05-14 PROCEDURE — 94729 PR C02/MEMBANE DIFFUSE CAPACITY: ICD-10-PCS | Mod: 26,,, | Performed by: INTERNAL MEDICINE

## 2020-05-14 PROCEDURE — 94060 PR EVAL OF BRONCHOSPASM: ICD-10-PCS | Mod: 26,,, | Performed by: INTERNAL MEDICINE

## 2020-05-14 PROCEDURE — 94727 PR PULM FUNCTION TEST BY GAS: ICD-10-PCS | Mod: 26,,, | Performed by: INTERNAL MEDICINE

## 2020-05-14 PROCEDURE — 94060 EVALUATION OF WHEEZING: CPT | Mod: 26,,, | Performed by: INTERNAL MEDICINE

## 2020-05-14 PROCEDURE — 71250 CT THORAX DX C-: CPT | Mod: TC

## 2020-05-14 RX ORDER — ALBUTEROL SULFATE 2.5 MG/.5ML
2.5 SOLUTION RESPIRATORY (INHALATION) ONCE
Status: COMPLETED | OUTPATIENT
Start: 2020-05-14 | End: 2020-05-14

## 2020-05-14 RX ADMIN — ALBUTEROL SULFATE 2.5 MG: 2.5 SOLUTION RESPIRATORY (INHALATION) at 01:05

## 2020-05-18 ENCOUNTER — TELEPHONE (OUTPATIENT)
Dept: PULMONOLOGY | Facility: CLINIC | Age: 65
End: 2020-05-18

## 2020-05-18 DIAGNOSIS — R91.8 LUNG MASS: Primary | ICD-10-CM

## 2020-05-18 NOTE — TELEPHONE ENCOUNTER
Result of pft and ct scan d/w patient.  Persistent rll lung mass.  Recommend ct guided biopsy.  Patient agrees.  Will consult IR.

## 2020-05-26 ENCOUNTER — HOSPITAL ENCOUNTER (OUTPATIENT)
Dept: PREADMISSION TESTING | Facility: HOSPITAL | Age: 65
Discharge: HOME | End: 2020-05-26
Attending: RADIOLOGY
Payer: COMMERCIAL

## 2020-05-26 VITALS
HEART RATE: 69 BPM | RESPIRATION RATE: 18 BRPM | WEIGHT: 187.81 LBS | TEMPERATURE: 98 F | DIASTOLIC BLOOD PRESSURE: 69 MMHG | HEIGHT: 62 IN | BODY MASS INDEX: 34.56 KG/M2 | OXYGEN SATURATION: 96 % | SYSTOLIC BLOOD PRESSURE: 112 MMHG

## 2020-05-26 DIAGNOSIS — Z01.818 PREOP TESTING: Primary | ICD-10-CM

## 2020-05-26 LAB
ALBUMIN SERPL BCP-MCNC: 3.8 G/DL (ref 3.5–5.2)
ALP SERPL-CCNC: 56 U/L (ref 55–135)
ALT SERPL W/O P-5'-P-CCNC: 28 U/L (ref 10–44)
ANION GAP SERPL CALC-SCNC: 9 MMOL/L (ref 8–16)
AST SERPL-CCNC: 25 U/L (ref 10–40)
BASOPHILS # BLD AUTO: 0.03 K/UL (ref 0–0.2)
BASOPHILS NFR BLD: 0.5 % (ref 0–1.9)
BILIRUB SERPL-MCNC: 0.3 MG/DL (ref 0.1–1)
BUN SERPL-MCNC: 12 MG/DL (ref 8–23)
CALCIUM SERPL-MCNC: 9.4 MG/DL (ref 8.7–10.5)
CHLORIDE SERPL-SCNC: 105 MMOL/L (ref 95–110)
CO2 SERPL-SCNC: 29 MMOL/L (ref 23–29)
CREAT SERPL-MCNC: 1.1 MG/DL (ref 0.5–1.4)
DIFFERENTIAL METHOD: NORMAL
EOSINOPHIL # BLD AUTO: 0.1 K/UL (ref 0–0.5)
EOSINOPHIL NFR BLD: 2 % (ref 0–8)
ERYTHROCYTE [DISTWIDTH] IN BLOOD BY AUTOMATED COUNT: 14.2 % (ref 11.5–14.5)
EST. GFR  (AFRICAN AMERICAN): >60 ML/MIN/1.73 M^2
EST. GFR  (NON AFRICAN AMERICAN): 54 ML/MIN/1.73 M^2
GLUCOSE SERPL-MCNC: 111 MG/DL (ref 70–110)
HCT VFR BLD AUTO: 40.2 % (ref 37–48.5)
HGB BLD-MCNC: 12.9 G/DL (ref 12–16)
IMM GRANULOCYTES # BLD AUTO: 0.03 K/UL (ref 0–0.04)
IMM GRANULOCYTES NFR BLD AUTO: 0.5 % (ref 0–0.5)
INR PPP: 0.9 (ref 0.8–1.2)
LYMPHOCYTES # BLD AUTO: 1.3 K/UL (ref 1–4.8)
LYMPHOCYTES NFR BLD: 23.4 % (ref 18–48)
MCH RBC QN AUTO: 29 PG (ref 27–31)
MCHC RBC AUTO-ENTMCNC: 32.1 G/DL (ref 32–36)
MCV RBC AUTO: 90 FL (ref 82–98)
MONOCYTES # BLD AUTO: 0.6 K/UL (ref 0.3–1)
MONOCYTES NFR BLD: 10.2 % (ref 4–15)
NEUTROPHILS # BLD AUTO: 3.5 K/UL (ref 1.8–7.7)
NEUTROPHILS NFR BLD: 63.4 % (ref 38–73)
NRBC BLD-RTO: 0 /100 WBC
PLATELET # BLD AUTO: 165 K/UL (ref 150–350)
PMV BLD AUTO: 12.5 FL (ref 9.2–12.9)
POTASSIUM SERPL-SCNC: 4.2 MMOL/L (ref 3.5–5.1)
PROT SERPL-MCNC: 7.5 G/DL (ref 6–8.4)
PROTHROMBIN TIME: 10 SEC (ref 9–12.5)
RBC # BLD AUTO: 4.45 M/UL (ref 4–5.4)
SARS-COV-2 RDRP RESP QL NAA+PROBE: NEGATIVE
SODIUM SERPL-SCNC: 143 MMOL/L (ref 136–145)
WBC # BLD AUTO: 5.48 K/UL (ref 3.9–12.7)

## 2020-05-26 PROCEDURE — 85025 COMPLETE CBC W/AUTO DIFF WBC: CPT

## 2020-05-26 PROCEDURE — 80053 COMPREHEN METABOLIC PANEL: CPT

## 2020-05-26 PROCEDURE — 36415 COLL VENOUS BLD VENIPUNCTURE: CPT

## 2020-05-26 PROCEDURE — U0002 COVID-19 LAB TEST NON-CDC: HCPCS

## 2020-05-26 PROCEDURE — 85610 PROTHROMBIN TIME: CPT

## 2020-05-26 NOTE — DISCHARGE INSTRUCTIONS
"Your procedure  is scheduled for __5/28/2020________.      Report to Same Day Surgery Unit at _7:00___ AM on the 2nd floor of the hospital.  Use the front entrance of the hospital.  The front doors of the hospital open promptly at 5:30am.  If you need wheelchair assistance, call [phone] from your cell phone, or call "0" from the courtesy phone in the lobby.    Important instructions:  Do not eat or drink after 12 midnight, including water.  It is okay to brush your teeth.  Do not have gum, candy or mints.     Take your morning medications with small sips of water.   Do not take diabetic medication.     Do not take blood thinners such as aspirin, ibuprofen, naproxyn (Aleve), fish oil tablets, Vitamin E, meloxicam.       Please shower the night before and the morning of your surgery.  Use Hibiclens soap if instructed to do so.     Do not wear make- up, including mascara.     You may wear deodorant only.      Do not wear powder, body lotion or perfume/cologne.     Do not wear any jewelry or have any metal on your body.     You will be asked to remove any dentures or partials for the procedure.     Please bring any documents given to you by your doctor.     If you are going home on the same day of surgery, you must arrange for a family member or a friend to drive you home.  Public transportation is prohibited.  You will not be able to drive home if you were given anesthesia or sedation.     Wear loose fitting clothes allowing for bandages.     Please leave money and valuables home.       You may bring your cell phone.     Call the doctor if fever or illness should occur before your surgery.    Call [phone] to contact us here if needed.  "

## 2020-05-28 ENCOUNTER — HOSPITAL ENCOUNTER (OUTPATIENT)
Dept: INTERVENTIONAL RADIOLOGY/VASCULAR | Facility: HOSPITAL | Age: 65
Discharge: HOME | End: 2020-05-28
Attending: INTERNAL MEDICINE
Payer: COMMERCIAL

## 2020-05-28 ENCOUNTER — HOSPITAL ENCOUNTER (OUTPATIENT)
Facility: HOSPITAL | Age: 65
Discharge: HOME | End: 2020-05-28
Attending: RADIOLOGY | Admitting: RADIOLOGY
Payer: COMMERCIAL

## 2020-05-28 VITALS
DIASTOLIC BLOOD PRESSURE: 60 MMHG | WEIGHT: 187.81 LBS | HEIGHT: 62 IN | OXYGEN SATURATION: 93 % | RESPIRATION RATE: 18 BRPM | BODY MASS INDEX: 34.56 KG/M2 | HEART RATE: 64 BPM | TEMPERATURE: 98 F | SYSTOLIC BLOOD PRESSURE: 126 MMHG

## 2020-05-28 VITALS
DIASTOLIC BLOOD PRESSURE: 67 MMHG | RESPIRATION RATE: 16 BRPM | SYSTOLIC BLOOD PRESSURE: 123 MMHG | OXYGEN SATURATION: 98 % | HEART RATE: 69 BPM

## 2020-05-28 DIAGNOSIS — R91.8 LUNG MASS: ICD-10-CM

## 2020-05-28 DIAGNOSIS — Z01.818 PREOP TESTING: Primary | ICD-10-CM

## 2020-05-28 PROCEDURE — 77012 PR  CT GUIDANCE NEEDLE PLACEMENT: ICD-10-PCS | Mod: 26,,, | Performed by: RADIOLOGY

## 2020-05-28 PROCEDURE — 88342 IMHCHEM/IMCYTCHM 1ST ANTB: CPT | Performed by: PATHOLOGY

## 2020-05-28 PROCEDURE — 88341 PR IHC OR ICC EACH ADD'L SINGLE ANTIBODY  STAINPR: ICD-10-PCS | Mod: 26,,, | Performed by: PATHOLOGY

## 2020-05-28 PROCEDURE — 88342 CHG IMMUNOCYTOCHEMISTRY: ICD-10-PCS | Mod: 26,,, | Performed by: PATHOLOGY

## 2020-05-28 PROCEDURE — 99152 PR MOD CONSCIOUS SEDATION, SAME PHYS, 5+ YRS, FIRST 15 MIN: ICD-10-PCS | Mod: ,,, | Performed by: RADIOLOGY

## 2020-05-28 PROCEDURE — 88305 TISSUE EXAM BY PATHOLOGIST: CPT | Performed by: PATHOLOGY

## 2020-05-28 PROCEDURE — 99152 MOD SED SAME PHYS/QHP 5/>YRS: CPT

## 2020-05-28 PROCEDURE — 88305 TISSUE EXAM BY PATHOLOGIST: CPT | Mod: 26,,, | Performed by: PATHOLOGY

## 2020-05-28 PROCEDURE — 77012 CT SCAN FOR NEEDLE BIOPSY: CPT | Mod: TC

## 2020-05-28 PROCEDURE — 88342 IMHCHEM/IMCYTCHM 1ST ANTB: CPT | Mod: 26,,, | Performed by: PATHOLOGY

## 2020-05-28 PROCEDURE — 77012 CT SCAN FOR NEEDLE BIOPSY: CPT | Mod: 26,,, | Performed by: RADIOLOGY

## 2020-05-28 PROCEDURE — 88341 IMHCHEM/IMCYTCHM EA ADD ANTB: CPT | Performed by: PATHOLOGY

## 2020-05-28 PROCEDURE — 63600175 PHARM REV CODE 636 W HCPCS: Performed by: RADIOLOGY

## 2020-05-28 PROCEDURE — 99152 MOD SED SAME PHYS/QHP 5/>YRS: CPT | Mod: ,,, | Performed by: RADIOLOGY

## 2020-05-28 PROCEDURE — 32405 IR BIOPSY LUNG: ICD-10-PCS | Mod: RT,,, | Performed by: RADIOLOGY

## 2020-05-28 PROCEDURE — 32405 IR BIOPSY LUNG: CPT | Mod: RT,,, | Performed by: RADIOLOGY

## 2020-05-28 PROCEDURE — 99153 MOD SED SAME PHYS/QHP EA: CPT

## 2020-05-28 PROCEDURE — 88341 IMHCHEM/IMCYTCHM EA ADD ANTB: CPT | Mod: 26,,, | Performed by: PATHOLOGY

## 2020-05-28 PROCEDURE — 88305 TISSUE EXAM BY PATHOLOGIST: ICD-10-PCS | Mod: 26,,, | Performed by: PATHOLOGY

## 2020-05-28 PROCEDURE — 27201068 IR BIOPSY LUNG

## 2020-05-28 RX ORDER — MIDAZOLAM HYDROCHLORIDE 1 MG/ML
INJECTION INTRAMUSCULAR; INTRAVENOUS CODE/TRAUMA/SEDATION MEDICATION
Status: COMPLETED | OUTPATIENT
Start: 2020-05-28 | End: 2020-05-28

## 2020-05-28 RX ORDER — FENTANYL CITRATE 50 UG/ML
INJECTION, SOLUTION INTRAMUSCULAR; INTRAVENOUS CODE/TRAUMA/SEDATION MEDICATION
Status: COMPLETED | OUTPATIENT
Start: 2020-05-28 | End: 2020-05-28

## 2020-05-28 RX ADMIN — FENTANYL CITRATE 25 MCG: 50 INJECTION INTRAMUSCULAR; INTRAVENOUS at 08:05

## 2020-05-28 RX ADMIN — MIDAZOLAM HYDROCHLORIDE 0.5 MG: 1 INJECTION, SOLUTION INTRAMUSCULAR; INTRAVENOUS at 08:05

## 2020-05-28 NOTE — DISCHARGE SUMMARY
Radiology Discharge Summary      Hospital Course: No complications    Admit Date: 5/28/2020  Discharge Date: 05/28/2020     Instructions Given to Patient: Yes  Diet: Resume prior diet  Activity: activity as tolerated and no driving for today    Description of Condition on Discharge: Stable  Vital Signs (Most Recent): Temp: 97.8 °F (36.6 °C) (05/28/20 0920)  Pulse: 68 (05/28/20 1150)  Resp: 18 (05/28/20 1150)  BP: (!) 109/58 (05/28/20 1150)  SpO2: 95 % (05/28/20 1150)    Discharge Disposition: Home    Discharge Diagnosis: RLL lung mass s/p CT guided bx       Follow-up: with Dr. Cannon for bx results    Tex Bowers MD  Staff Radiologist  Department of Radiology  Pager: [phone]

## 2020-05-28 NOTE — PROCEDURES
Radiology Post-Procedure Note    Pre Op Diagnosis: Right lung mass, 20 pack year smoking history    Post Op Diagnosis: Right lung mass    Procedure: right lung biopsy    Procedure performed by: Tex Bowers MD    Written Informed Consent Obtained: Yes    Specimen Removed: YES 3 cores    Estimated Blood Loss: Minimal    Findings:   Using CT guidance a 19g sheath needle was placed into a Right lung mass. 20g monopty biopsy gun used to take 3 core biopsy samples. Specimen sent to pathology.     Additional specimen sent to lab: No    Patient tolerated procedure well.    Tex Bowers MD  Staff Radiologist  Department of Radiology  Pager: [phone]

## 2020-05-28 NOTE — DISCHARGE INSTRUCTIONS
BATHING:  ? You may shower tomorrow.  DRESSING:  ? Remove dressing tomorrow.        ACTIVITY LEVEL: If you have received sedation or an anesthetic, you may feel sleepy for several hours. Rest until you are more awake. Gradually resume your normal activities    Do not drive, drink alcohol, or sign legal documents for 24 hours, or if taking narcotic pain medication.      DIET: You may resume your home diet. If nausea is present, increase your diet gradually with fluids and bland foods.    Medications: Pain medication should be taken only if needed and as directed. If antibiotics are prescribed, the medication should be taken until completed. You will be given an updated list of you medications.  ? No driving, alcoholic beverages or signing legal documents for next 24 hours if you have had sedation, or while taking pain medication    CALL THE DOCTOR:   For any obvious bleeding (some dried blood over the incision is normal).     Redness, swelling, foul smell around incision or fever over 100.4  Shortness of breath.  Persistent pain or nausea not relieved by medication.  Call  [phone]     to speak with an Interventional Radiologist    If any unusual problems or difficulties occur contact your doctor. If you cannot contact your doctor but feel your signs and symptoms warrant a physicians attention return to the emergency room.          Discharge Instructions Needle Biopsy: Lung  You had a procedure called a needle biopsy of one of your lungs. In this procedure, a hollow needle is used to take one or more samples of your lung tissue. The tissue is then examined under a microscope. There are several different types of needle biopsies. Two types are:  · Fine needle aspiration. A small amount of tissue is withdrawn (aspirated) using a very fine needle.  · Core biopsy. A larger tissue sample is removed for examination.  A biopsy needle is inserted through your skin into your chest and lung. This is called a transthoracic  approach, which means across or through the chest (thorax). Scans are done at the same time so that your provider can find the area where he or she would like to sample tissue. Needle biopsies do not require cuts or incisions into the body like open biopsies.  Your healthcare provider will use the results of your biopsy to help diagnose your condition.  Home care  · The site of the biopsy may feel numb for a while if you received numbing medicine.  · You might have a little soreness following the needle biopsy.  · Follow your healthcare provider's instructions about removing bandages and showering or bathing.  · You may be sleepy after the biopsy if you received medicine to help you relax (sedation). You should not drive until the next day or as instructed by your healthcare provider.  · You should not do heavy lifting, a lot of stair climbing, or take part in sports the day of your biopsy. You can get back to your regular activities as instructed by your healthcare provider.  Follow-up care  Follow up with your healthcare provider, or as advised. Be sure you make an appointment with your healthcare provider to discuss the biopsy results.     When to seek medical advice  Call your healthcare provider right away if any of these occur:  · Infection. You might have redness, pain, swelling, or drainage at the site of your biopsies.  · Bleeding. You might also have bleeding at the site of your biopsies.  · Coughing up blood. This may only be a small amount.  · Collapsed lung (pneumothorax). This means that air from your lungs leaks out into the spaces between your lungs and chest wall. It can lead to trouble breathing and a collapsed lung. Watch for trouble breathing, a fast pulse, sharp pains in your chest or shoulder, and bluish skin   Date Last Reviewed: 2/1/2017  © 1473-0905 [email or web address]. [address] [place] [address], [place] [address]. All rights reserved. This information is not intended as a substitute for  professional medical care. Always follow your healthcare professional's instructions.      Fall Prevention  Millions of people fall every year and injure themselves. You may have had anesthesia or sedation which may increase your risk of falling. You may have health issues that put you at an increased risk of falling.     Here are ways to reduce your risk of falling.  ·   · Make your home safe by keeping walkways clear of objects you may trip over.  · Use non-slip pads under rugs. Do not use area rugs or small throw rugs.  · Use non-slip mats in bathtubs and showers.  · Install handrails and lights on staircases.  · Do not walk in poorly lit areas.  · Do not stand on chairs or wobbly ladders.  · Use caution when reaching overhead or looking upward. This position can cause a loss of balance.  · Be sure your shoes fit properly, have non-slip bottoms and are in good condition.   · Wear shoes both inside and out. Avoid going barefoot or wearing slippers.  · Be cautious when going up and down stairs, curbs, and when walking on uneven sidewalks.  · If your balance is poor, consider using a cane or walker.  · If your fall was related to alcohol use, stop or limit alcohol intake.   · If your fall was related to use of sleeping medicines, talk to your doctor about this. You may need to reduce your dosage at bedtime if you awaken during the night to go to the bathroom.    · To reduce the need for nighttime bathroom trips:  ¨ Avoid drinking fluids for several hours before going to bed  ¨ Empty your bladder before going to bed  ¨ Men can keep a urinal at the bedside  · Stay as active as you can. Balance, flexibility, strength, and endurance all come from exercise. They all play a role in preventing falls. Ask your healthcare provider which types of activity are right for you.  · Get your vision checked on a regular basis.  · If you have pets, know where they are before you stand up or walk so you don't trip over them.  · Use  night lights.

## 2020-05-28 NOTE — SEDATION DOCUMENTATION
Report called to AILYN Donaldson in [facility]. RLL lung biopsy completed. Tolerated well. Specimen sent to lab. Post procedure CXR completed. Next CXR due around noon. Site to right posterior chest with vaseline gauze and bandaid, CDI, no redness, swelling or hematoma noted. CIARA BALLESTEROS. Transported to [facility] per RN.

## 2020-05-28 NOTE — SEDATION DOCUMENTATION
Lab in procedure room assisting with sample collection. Maco with lab now taking samples to lab to check for sample adequacy with pathologist. Awaiting call from pathologist.

## 2020-05-28 NOTE — H&P
Radiology History & Physical      SUBJECTIVE:     Chief Complaint: right lung mass    History of Present Illness:  Hannah Montoya is a 64 y.o. female who presents for CT guided lung biopsy  Past Medical History:   Diagnosis Date    AICD (automatic cardioverter/defibrillator) present     Asthma     bronchitis in past    Breast cancer 2016    right    Cardiac pacemaker     Cardiomyopathy     COPD (chronic obstructive pulmonary disease)     Diabetes mellitus, type 2     Hyperglycemia     Hyperlipidemia     Hypertension     Malignant neoplasm of overlapping sites of female breast 2016    Respiratory distress 3/12/2020     Past Surgical History:   Procedure Laterality Date    BREAST BIOPSY Right 2016    IDC    BREAST LUMPECTOMY Right     CARDIAC CATHETERIZATION Bilateral 2017    CARDIAC DEFIBRILLATOR PLACEMENT Left 08/10/2017    CARDIAC DEFIBRILLATOR PLACEMENT Left 2018     SECTION      x2    CHOLECYSTECTOMY      REVISION OF IMPLANTABLE CARDIOVERTER-DEFIBRILLATOR (ICD) ELECTRODE LEAD PLACEMENT N/A 6/15/2018    Procedure: REVISION-LEAD-ICD;  Surgeon: Javy Gates MD;  Location: [facility] CATH LAB;  Service: Cardiology;  Laterality: N/A;  LBBB, Bi-V ICD HIS Ld rev, MDT, Choice, MB, 3 Prep    TUBAL LIGATION         Home Meds:   Prior to Admission medications    Medication Sig Start Date End Date Taking? Authorizing Provider   albuterol sulfate (PROAIR RESPICLICK) 90 mcg/actuation AePB Inhale 2 puffs into the lungs every 4 (four) hours as needed. Rescue 3/6/20   Emanuel Chavez MD   albuterol-ipratropium (DUO-NEB) 2.5 mg-0.5 mg/3 mL nebulizer solution Take 3 mLs by nebulization every 6 (six) hours while awake. Rescue 3/16/20   Emanuel Chavez MD   atorvastatin (LIPITOR) 10 MG tablet Take 1 tablet (10 mg total) by mouth once daily. 20  Emanuel Chavez MD   carvediloL (COREG) 25 MG tablet TAKE 1 TABLET(25 MG) BY MOUTH TWICE DAILY 3/25/20   Emanuel Chavez  MD   cetirizine (ZYRTEC) 10 MG tablet Take 10 mg by mouth every evening.     Historical Provider, MD   furosemide (LASIX) 40 MG tablet TAKE 1 TABLET(40 MG) BY MOUTH TWICE DAILY 3/25/20   Emanuel Chavez MD   lisinopril 10 MG tablet Take 1 tablet (10 mg total) by mouth once daily.  Patient taking differently: Take 10 mg by mouth as needed.  7/13/18 5/26/20  Emanuel Chavez MD   metFORMIN (GLUCOPHAGE) 500 MG tablet TAKE 1 TABLET BY MOUTH TWICE A DAY 5/6/20   Emanuel Chavez MD   multivitamin (ONE DAILY MULTIVITAMIN) per tablet Take 1 tablet by mouth once daily.    Historical Provider, MD   sertraline (ZOLOFT) 100 MG tablet TAKE ONE TABLET BY MOUTH EVERY MORNING  Patient taking differently: Take 100 mg by mouth every evening.  3/27/20   Emanuel Chavez MD   TRUETEST TEST STRIPS Strp test once EVERY MORNING 5/14/18   Emanuel Chavez MD   WIXELA INHUB 250-50 mcg/dose diskus inhaler INHALE 1 PUFF INTO THE LUNGS 2 (TWO) TIMES DAILY. 5/12/20   Emanuel Chavez MD     Anticoagulants/Antiplatelets: no anticoagulation    Allergies:   Review of patient's allergies indicates:   Allergen Reactions    Adhesive Rash     tegaderm burns and blistered skin     Sedation History:  no adverse reactions    Review of Systems:   Hematological: no known coagulopathies  Respiratory: hx of COPD; no shortness of breath  Cardiovascular: hx of cardiomyopathy with AICD; no chest pain  Gastrointestinal: no abdominal pain  Genito-Urinary: no dysuria  Musculoskeletal: negative  Neurological: no TIA or stroke symptoms         OBJECTIVE:     Vital Signs (Most Recent)       Physical Exam:  ASA: 2  Mallampati: 2    General: no acute distress  Mental Status: alert and oriented to person, place and time  HEENT: normocephalic, atraumatic  Chest: unlabored breathing  Heart: regular heart rate  Abdomen: nondistended  Extremity: moves all extremities    Laboratory  Lab Results   Component Value Date    INR 0.9 05/26/2020       Lab Results   Component  Value Date    WBC 5.48 05/26/2020    HGB 12.9 05/26/2020    HCT 40.2 05/26/2020    MCV 90 05/26/2020     05/26/2020      Lab Results   Component Value Date     (H) 05/26/2020     05/26/2020    K 4.2 05/26/2020     05/26/2020    CO2 29 05/26/2020    BUN 12 05/26/2020    CREATININE 1.1 05/26/2020    CALCIUM 9.4 05/26/2020    MG 1.7 05/03/2017    ALT 28 05/26/2020    AST 25 05/26/2020    ALBUMIN 3.8 05/26/2020    BILITOT 0.3 05/26/2020    BILIDIR 0.0 (L) 08/10/2009       ASSESSMENT/PLAN:     Sedation Plan: moderate  Patient will undergo CT guided RLL lung bx.    Tex Bowers MD  Staff Radiologist  Department of Radiology  Pager: [phone]

## 2020-05-29 DIAGNOSIS — Z12.11 COLON CANCER SCREENING: ICD-10-CM

## 2020-05-29 RX ORDER — METFORMIN HYDROCHLORIDE 500 MG/1
TABLET ORAL
Qty: 60 TABLET | Refills: 0 | Status: SHIPPED | OUTPATIENT
Start: 2020-05-29 | End: 2020-06-17

## 2020-06-16 ENCOUNTER — OFFICE VISIT (OUTPATIENT)
Dept: PULMONOLOGY | Facility: CLINIC | Age: 65
End: 2020-06-16
Payer: COMMERCIAL

## 2020-06-16 VITALS
BODY MASS INDEX: 33.95 KG/M2 | HEART RATE: 84 BPM | TEMPERATURE: 98 F | WEIGHT: 184.5 LBS | HEIGHT: 62 IN | OXYGEN SATURATION: 93 % | DIASTOLIC BLOOD PRESSURE: 82 MMHG | SYSTOLIC BLOOD PRESSURE: 147 MMHG

## 2020-06-16 DIAGNOSIS — R91.8 LUNG MASS: ICD-10-CM

## 2020-06-16 DIAGNOSIS — R91.1 SOLITARY PULMONARY NODULE: ICD-10-CM

## 2020-06-16 DIAGNOSIS — J41.0 SIMPLE CHRONIC BRONCHITIS: ICD-10-CM

## 2020-06-16 LAB
FINAL PATHOLOGIC DIAGNOSIS: NORMAL
GROSS: NORMAL
MICROSCOPIC EXAM: NORMAL
SUPPLEMENTAL DIAGNOSIS: NORMAL

## 2020-06-16 PROCEDURE — 99214 PR OFFICE/OUTPT VISIT, EST, LEVL IV, 30-39 MIN: ICD-10-PCS | Mod: S$GLB,,, | Performed by: INTERNAL MEDICINE

## 2020-06-16 PROCEDURE — 3079F DIAST BP 80-89 MM HG: CPT | Mod: CPTII,S$GLB,, | Performed by: INTERNAL MEDICINE

## 2020-06-16 PROCEDURE — 3077F SYST BP >= 140 MM HG: CPT | Mod: CPTII,S$GLB,, | Performed by: INTERNAL MEDICINE

## 2020-06-16 PROCEDURE — 3077F PR MOST RECENT SYSTOLIC BLOOD PRESSURE >= 140 MM HG: ICD-10-PCS | Mod: CPTII,S$GLB,, | Performed by: INTERNAL MEDICINE

## 2020-06-16 PROCEDURE — 3008F PR BODY MASS INDEX (BMI) DOCUMENTED: ICD-10-PCS | Mod: CPTII,S$GLB,, | Performed by: INTERNAL MEDICINE

## 2020-06-16 PROCEDURE — 99999 PR PBB SHADOW E&M-EST. PATIENT-LVL III: CPT | Mod: PBBFAC,,, | Performed by: INTERNAL MEDICINE

## 2020-06-16 PROCEDURE — 3008F BODY MASS INDEX DOCD: CPT | Mod: CPTII,S$GLB,, | Performed by: INTERNAL MEDICINE

## 2020-06-16 PROCEDURE — 99214 OFFICE O/P EST MOD 30 MIN: CPT | Mod: S$GLB,,, | Performed by: INTERNAL MEDICINE

## 2020-06-16 PROCEDURE — 3079F PR MOST RECENT DIASTOLIC BLOOD PRESSURE 80-89 MM HG: ICD-10-PCS | Mod: CPTII,S$GLB,, | Performed by: INTERNAL MEDICINE

## 2020-06-16 PROCEDURE — 99999 PR PBB SHADOW E&M-EST. PATIENT-LVL III: ICD-10-PCS | Mod: PBBFAC,,, | Performed by: INTERNAL MEDICINE

## 2020-06-16 RX ORDER — TIOTROPIUM BROMIDE 18 UG/1
18 CAPSULE ORAL; RESPIRATORY (INHALATION) DAILY
Qty: 30 CAPSULE | Refills: 3 | Status: SHIPPED | OUTPATIENT
Start: 2020-06-16 | End: 2020-08-03 | Stop reason: SDUPTHER

## 2020-06-16 NOTE — PROGRESS NOTES
Hannah Montoya  was seen as a follow up.    CHIEF COMPLAINT:  COPD      HISTORY OF PRESENT ILLNESS: Hannah Montoya is a 64 y.o. female  has a past medical history of AICD (automatic cardioverter/defibrillator) present, Asthma, Breast cancer (2016), Cardiac pacemaker, Cardiomyopathy, COPD (chronic obstructive pulmonary disease), Diabetes mellitus, type 2, Hyperglycemia, Hyperlipidemia, Hypertension, Malignant neoplasm of overlapping sites of female breast (2/12/2016), and Respiratory distress (3/12/2020).  Our first encounter was 5/8/20.  Patient smoked .5 ppd x 40 years.  Patient quit in 2016.  Patient presented to ED 3/11/20 with sob, nasal congestion.  covid 19 was negative. Patient was treated with antibiotics and prednisone with improvement in symptoms.  Ct mentioned 3.2 cm rll mass.      S/p ct guided biopsy 5/28/20.  No new issue since last visit.  Today, patient denied fever/chills.  No coughing.  No hemoptysis.  9 lbs weight loss.  Poor appetite in which patient is attributed to stress of social distancing.  In addition, patient is active with house choir without issue.  No chest pain.  No orthopnea.  No pnd.     PAST MEDICAL HISTORY:    Active Ambulatory Problems     Diagnosis Date Noted    Essential hypertension 10/07/2014    COPD (chronic obstructive pulmonary disease) 04/24/2015    Mild protein malnutrition 07/05/2016    Elevated troponin 07/05/2016    Type 2 diabetes mellitus without complication 07/05/2016    Seasonal allergies 03/23/2017    Left bundle-branch block 08/14/2017    Dilated cardiomyopathy 04/03/2018    NICM (nonischemic cardiomyopathy) 05/02/2018    Malfunction of implantable defibrillator ventricular (ICD) lead 06/15/2018    Pain, dental 03/17/2019    Cardiac resynchronization therapy defibrillator (CRT-D) in place 09/30/2019    History of breast cancer in female 02/03/2020    Hypoxia 03/12/2020    Pneumonia due to infectious organism 03/12/2020    Lung mass  2020     Resolved Ambulatory Problems     Diagnosis Date Noted    Tobacco abuse 2015    Malignant neoplasm of overlapping sites of female breast 2016    Breast CA 2016    Acute exacerbation of chronic obstructive pulmonary disease (COPD) 2016    Acute respiratory failure with hypoxia and hypercapnia 2016    Metabolic acidosis 2016    Sepsis 2016    Acute diastolic congestive heart failure 2017    Dehydration     Acute systolic congestive heart failure 2017    Respiratory distress 2020    COPD exacerbation 2020     Past Medical History:   Diagnosis Date    AICD (automatic cardioverter/defibrillator) present     Asthma     Breast cancer     Cardiac pacemaker     Cardiomyopathy     Diabetes mellitus, type 2     Hyperglycemia     Hyperlipidemia     Hypertension                 PAST SURGICAL HISTORY:    Past Surgical History:   Procedure Laterality Date    BREAST BIOPSY Right 2016    IDC    BREAST LUMPECTOMY Right     CARDIAC CATHETERIZATION Bilateral 2017    CARDIAC DEFIBRILLATOR PLACEMENT Left 08/10/2017    CARDIAC DEFIBRILLATOR PLACEMENT Left 2018     SECTION      x2    CHOLECYSTECTOMY      LUNG BIOPSY N/A 2020    Procedure: BIOPSY, LUNG;  Surgeon: [facility] Diagnostic Provider;  Location: [facility] OR;  Service: Radiology;  Laterality: N/A;  8AM START  RN PREOP 2020---COVID NEGATIVE    REVISION OF IMPLANTABLE CARDIOVERTER-DEFIBRILLATOR (ICD) ELECTRODE LEAD PLACEMENT N/A 6/15/2018    Procedure: REVISION-LEAD-ICD;  Surgeon: Javy Gates MD;  Location: [facility] CATH LAB;  Service: Cardiology;  Laterality: N/A;  LBBB, Bi-V ICD HIS Ld rev, MDT, Choice, MB, 3 Prep    TUBAL LIGATION           FAMILY HISTORY:                Family History   Problem Relation Age of Onset    Kidney disease Mother     Kidney disease Father     Clotting disorder Brother     Breast cancer Neg Hx     Ovarian  cancer Neg Hx        SOCIAL HISTORY:          Tobacco:   Social History     Tobacco Use   Smoking Status Former Smoker    Packs/day: 0.50    Years: 40.00    Pack years: 20.00    Types: Cigarettes    Quit date: 8/1/2016    Years since quitting: 3.8   Smokeless Tobacco Never Used     alcohol use:    Social History     Substance and Sexual Activity   Alcohol Use Yes    Frequency: Monthly or less    Drinks per session: 1 or 2    Binge frequency: Never    Comment: rare- holiday               Occupation:  Former     ALLERGIES:    Review of patient's allergies indicates:   Allergen Reactions    Adhesive Rash     tegaderm burns and blistered skin       CURRENT MEDICATIONS:    Current Outpatient Medications   Medication Sig Dispense Refill    albuterol sulfate (PROAIR RESPICLICK) 90 mcg/actuation AePB Inhale 2 puffs into the lungs every 4 (four) hours as needed. Rescue 1 each 5    albuterol-ipratropium (DUO-NEB) 2.5 mg-0.5 mg/3 mL nebulizer solution Take 3 mLs by nebulization every 6 (six) hours while awake. Rescue 30 mL 5    atorvastatin (LIPITOR) 10 MG tablet Take 1 tablet (10 mg total) by mouth once daily. 90 tablet 3    carvediloL (COREG) 25 MG tablet TAKE 1 TABLET(25 MG) BY MOUTH TWICE DAILY 180 tablet 3    cetirizine (ZYRTEC) 10 MG tablet Take 10 mg by mouth every evening.       furosemide (LASIX) 40 MG tablet TAKE 1 TABLET(40 MG) BY MOUTH TWICE DAILY 180 tablet 3    metFORMIN (GLUCOPHAGE) 500 MG tablet TAKE ONE TABLET BY MOUTH TWICE DAILY 60 tablet 0    multivitamin (ONE DAILY MULTIVITAMIN) per tablet Take 1 tablet by mouth once daily.      sertraline (ZOLOFT) 100 MG tablet TAKE ONE TABLET BY MOUTH EVERY MORNING (Patient taking differently: Take 100 mg by mouth every evening. ) 30 tablet 5    TRUETEST TEST STRIPS Strp test once EVERY MORNING 100 strip 1    WIXELA INHUB 250-50 mcg/dose diskus inhaler INHALE 1 PUFF INTO THE LUNGS 2 (TWO) TIMES DAILY. 60 each 5    lisinopril 10 MG  "tablet Take 1 tablet (10 mg total) by mouth once daily. (Patient taking differently: Take 10 mg by mouth as needed. ) 90 tablet 3    tiotropium (SPIRIVA WITH HANDIHALER) 18 mcg inhalation capsule Inhale 1 capsule (18 mcg total) into the lungs once daily. 30 capsule 3     No current facility-administered medications for this visit.                   REVIEW OF SYSTEMS:     Pulmonary related symptoms as per HPI.  Gen:  + weight loss, no fever, no night sweat  HEENT:  no visual changes, no sore throat, no hearing loss  CV:  No chest pain, no orthopnea, no PND  GI:  no melena, no hematochezia, no diarhea, no constipation.  :  no dysuria, no hematuria, no hesistancy, no dribbling  Neuro:  no syncope, no vertigo, no tinitus  Psych:  No homocide or suicide ideation; no depression.  Endocrine:  No heat or cold intolerance.  Sleep:  No snoring; no witnessed apnea.  Feeling rested upon awake  Otherwise, a balance of systems reviewed is negative.          PHYSICAL EXAM:  Vitals:    06/16/20 1040   BP: (!) 147/82   Pulse: 84   Temp: 97.5 °F (36.4 °C)   TempSrc: Oral   SpO2: (!) 93%   Weight: 83.7 kg (184 lb 8.4 oz)   Height: 5' 2" (1.575 m)   PainSc: 0-No pain     Body mass index is 33.75 kg/m².     GENERAL:  well develop; no apparent distress  HEENT:  no nasal congestion; no discharge noted; class 3 modified mallampatti.   NECK:  supple; no palpable masses.  CARDIO: regular rate and rhythm  PULM:  clear to auscultation bilaterally; no intercostals retractions; no accessory muscle usage   ABDOMEN:  soft nontender/nondistended.  +bowel sound  EXTREMITIES no cce  NEURO:  CN II-XII intact.  5/5 motor in all extremities.  sensation grossly intact   to light touch.  PSYCH:  normal affect.  Alert and oriented x 4    LABS  Pulmonary Functions Testing Results(personally reviewed):  5/8/05 ratio 54%; fvc 79%; fev1 58%; tlc 103%; dlco 74%  ABG (personally reviewed):  none  CXR (personally reviewed):  3/11/20 rll and right perihilar " hyperattenuation  CT CHEST(personally reviewed):  3/11/20 rll 3.2 lobular soft tissue density; rml atelectasis.  New when compared to 2016 ct chest.      sars 3/12/20 negative    Echo 10/16/18  CONCLUSIONS     1 - Low normal to mildly depressed left ventricular systolic function (EF 50-55%).     2 - Wall motion abnormalities.     3 - Normal left ventricular diastolic function.     4 - Normal right ventricular systolic function .     5 - Trivial aortic regurgitation.     6 - Trivial to mild mitral regurgitation.     7 - Trivial tricuspid regurgitation.     8 - Trivial pulmonic regurgitation.     9 - The estimated PA systolic pressure is 31 mmHg.     ASSESSMENT/PLAN  Problem List Items Addressed This Visit     COPD (chronic obstructive pulmonary disease)    Overview     fev1 58%.  Currently on advair and duonebs.           Current Assessment & Plan     Will start lama.           Relevant Medications    tiotropium (SPIRIVA WITH HANDIHALER) 18 mcg inhalation capsule    Lung mass    Overview     3.2 rll cm mass and rml atelectasis.  ddx infection vs malignancy.  Repeat ct with resolution rml lesion.  S/p ct guided biopsy of rll.           Current Assessment & Plan     Result of ct guided biopsy d/w Dr. Beckford.  No evidence of malignancy.  Will monitor with serial imaging.  Ct in 3 months.             Other Visit Diagnoses     Solitary pulmonary nodule        Relevant Orders    CT Chest Without Contrast          chf - EF improved 20% to 50%      patient will No follow-ups on file. with md/np.

## 2020-06-16 NOTE — ASSESSMENT & PLAN NOTE
Result of ct guided biopsy d/w Dr. Beckford.  No evidence of malignancy.  Will monitor with serial imaging.  Ct in 3 months.

## 2020-07-03 ENCOUNTER — CLINICAL SUPPORT (OUTPATIENT)
Dept: CARDIOLOGY | Facility: HOSPITAL | Age: 65
End: 2020-07-03
Payer: COMMERCIAL

## 2020-07-03 DIAGNOSIS — Z95.810 PRESENCE OF AUTOMATIC (IMPLANTABLE) CARDIAC DEFIBRILLATOR: ICD-10-CM

## 2020-07-03 DIAGNOSIS — I50.9 HEART FAILURE, UNSPECIFIED: ICD-10-CM

## 2020-07-03 PROCEDURE — 93296 REM INTERROG EVL PM/IDS: CPT | Performed by: INTERNAL MEDICINE

## 2020-07-03 PROCEDURE — 93295 CARDIAC DEVICE CHECK - REMOTE: ICD-10-PCS | Mod: ,,, | Performed by: INTERNAL MEDICINE

## 2020-07-03 PROCEDURE — 93295 DEV INTERROG REMOTE 1/2/MLT: CPT | Mod: ,,, | Performed by: INTERNAL MEDICINE

## 2020-08-03 ENCOUNTER — OFFICE VISIT (OUTPATIENT)
Dept: FAMILY MEDICINE | Facility: CLINIC | Age: 65
End: 2020-08-03
Payer: MEDICARE

## 2020-08-03 VITALS
TEMPERATURE: 98 F | DIASTOLIC BLOOD PRESSURE: 84 MMHG | WEIGHT: 194.44 LBS | SYSTOLIC BLOOD PRESSURE: 144 MMHG | RESPIRATION RATE: 17 BRPM | BODY MASS INDEX: 35.78 KG/M2 | OXYGEN SATURATION: 94 % | HEART RATE: 78 BPM | HEIGHT: 62 IN

## 2020-08-03 DIAGNOSIS — I42.8 NICM (NONISCHEMIC CARDIOMYOPATHY): ICD-10-CM

## 2020-08-03 DIAGNOSIS — R91.8 LUNG MASS: ICD-10-CM

## 2020-08-03 DIAGNOSIS — I42.0 DILATED CARDIOMYOPATHY: ICD-10-CM

## 2020-08-03 DIAGNOSIS — Z20.822 CLOSE EXPOSURE TO COVID-19 VIRUS: ICD-10-CM

## 2020-08-03 DIAGNOSIS — F43.21 GRIEF REACTION: ICD-10-CM

## 2020-08-03 DIAGNOSIS — Z85.3 HISTORY OF BREAST CANCER IN FEMALE: ICD-10-CM

## 2020-08-03 DIAGNOSIS — I10 ESSENTIAL HYPERTENSION: ICD-10-CM

## 2020-08-03 DIAGNOSIS — J30.2 SEASONAL ALLERGIES: ICD-10-CM

## 2020-08-03 DIAGNOSIS — I44.7 LEFT BUNDLE-BRANCH BLOCK: ICD-10-CM

## 2020-08-03 DIAGNOSIS — J41.0 SIMPLE CHRONIC BRONCHITIS: Primary | ICD-10-CM

## 2020-08-03 DIAGNOSIS — E11.9 TYPE 2 DIABETES MELLITUS WITHOUT COMPLICATION, WITHOUT LONG-TERM CURRENT USE OF INSULIN: ICD-10-CM

## 2020-08-03 PROBLEM — J18.9 PNEUMONIA DUE TO INFECTIOUS ORGANISM: Status: RESOLVED | Noted: 2020-03-12 | Resolved: 2020-08-03

## 2020-08-03 PROBLEM — R09.02 HYPOXIA: Status: RESOLVED | Noted: 2020-03-12 | Resolved: 2020-08-03

## 2020-08-03 PROCEDURE — 99999 PR PBB SHADOW E&M-EST. PATIENT-LVL IV: CPT | Mod: PBBFAC,,, | Performed by: FAMILY MEDICINE

## 2020-08-03 PROCEDURE — 99214 OFFICE O/P EST MOD 30 MIN: CPT | Mod: S$PBB,,, | Performed by: FAMILY MEDICINE

## 2020-08-03 PROCEDURE — 99214 PR OFFICE/OUTPT VISIT, EST, LEVL IV, 30-39 MIN: ICD-10-PCS | Mod: S$PBB,,, | Performed by: FAMILY MEDICINE

## 2020-08-03 PROCEDURE — 99999 PR PBB SHADOW E&M-EST. PATIENT-LVL IV: ICD-10-PCS | Mod: PBBFAC,,, | Performed by: FAMILY MEDICINE

## 2020-08-03 PROCEDURE — 99214 OFFICE O/P EST MOD 30 MIN: CPT | Mod: PBBFAC,PN | Performed by: FAMILY MEDICINE

## 2020-08-03 RX ORDER — METFORMIN HYDROCHLORIDE 500 MG/1
500 TABLET ORAL 2 TIMES DAILY
Qty: 180 TABLET | Refills: 1 | Status: SHIPPED | OUTPATIENT
Start: 2020-08-03 | End: 2020-12-14

## 2020-08-03 RX ORDER — FUROSEMIDE 40 MG/1
40 TABLET ORAL DAILY
Qty: 90 TABLET | Refills: 1 | Status: SHIPPED | OUTPATIENT
Start: 2020-08-03 | End: 2021-02-04

## 2020-08-03 RX ORDER — ATORVASTATIN CALCIUM 10 MG/1
10 TABLET, FILM COATED ORAL DAILY
Qty: 90 TABLET | Refills: 3 | Status: SHIPPED | OUTPATIENT
Start: 2020-08-03 | End: 2021-05-27

## 2020-08-03 RX ORDER — SERTRALINE HYDROCHLORIDE 100 MG/1
100 TABLET, FILM COATED ORAL NIGHTLY
Qty: 90 TABLET | Refills: 1 | Status: SHIPPED | OUTPATIENT
Start: 2020-08-03 | End: 2020-12-14

## 2020-08-03 RX ORDER — CARVEDILOL 25 MG/1
TABLET ORAL
Qty: 180 TABLET | Refills: 3 | Status: SHIPPED | OUTPATIENT
Start: 2020-08-03 | End: 2021-01-12 | Stop reason: ALTCHOICE

## 2020-08-03 RX ORDER — TIOTROPIUM BROMIDE 18 UG/1
18 CAPSULE ORAL; RESPIRATORY (INHALATION) DAILY
Qty: 30 CAPSULE | Refills: 3 | Status: SHIPPED | OUTPATIENT
Start: 2020-08-03 | End: 2020-10-11

## 2020-08-03 NOTE — PROGRESS NOTES
Routine Office Visit    Patient Name: Hannah Montoya    : 1955  MRN: [id number]    Subjective:  Hannah is a 64 y.o. female who presents today for:    1. Medical concerns  Patient presenting today for follow up of her HTN, type 2 DM, COPD, and CHF.  She has been taking all medications as  Prescribed without any acute exacerbations of COPD or CHF.  She states that her blood sugars are doing well.  There is no chest pain, shortness of breath, or swelling present.  There has been no medication concerns.  She is followed by cardiology and states she is doing well.  She is requesting refills of her medications now be sent to [facility] as she is medicare.      Past Medical History  Past Medical History:   Diagnosis Date    AICD (automatic cardioverter/defibrillator) present     Asthma     bronchitis in past    Breast cancer 2016    right    Cardiac pacemaker     Cardiomyopathy     COPD (chronic obstructive pulmonary disease)     Diabetes mellitus, type 2     Hyperglycemia     Hyperlipidemia     Hypertension     Malignant neoplasm of overlapping sites of female breast 2016    Respiratory distress 3/12/2020       Past Surgical History  Past Surgical History:   Procedure Laterality Date    BREAST BIOPSY Right 2016    ID[facility]    BREAST LUMPECTOMY Right     CARDIAC CATHETERIZATION Bilateral 2017    CARDIAC DEFIBRILLATOR PLACEMENT Left 08/10/2017    CARDIAC DEFIBRILLATOR PLACEMENT Left 2018     SECTION      x2    CHOLECYSTECTOMY      LUNG BIOPSY N/A 2020    Procedure: BIOPSY, LUNG;  Surgeon: Kalpesh Diagnostic Provider;  Location: [facility] OR;  Service: Radiology;  Laterality: N/A;  8AM START  RN PREOP 2020---COVID NEGATIVE    REVISION OF IMPLANTABLE CARDIOVERTER-DEFIBRILLATOR (ICD) ELECTRODE LEAD PLACEMENT N/A 6/15/2018    Procedure: REVISION-LEAD-ICD;  Surgeon: Javy Gates MD;  Location: [facility] CATH LAB;  Service: Cardiology;  Laterality: N/A;  LBBB, Bi-V ICD HIS  Elmo jordan, MDT, Choice, MB, 3 Prep    TUBAL LIGATION         Family History  Family History   Problem Relation Age of Onset    Kidney disease Mother     Kidney disease Father     Clotting disorder Brother     Breast cancer Neg Hx     Ovarian cancer Neg Hx        Social History  Social History     Socioeconomic History    Marital status:      Spouse name: Not on file    Number of children: Not on file    Years of education: Not on file    Highest education level: Not on file   Occupational History     Employer: [employer]   Social Needs    Financial resource strain: Not hard at all    Food insecurity     Worry: Never true     Inability: Never true    Transportation needs     Medical: No     Non-medical: No   Tobacco Use    Smoking status: Former Smoker     Packs/day: 0.50     Years: 40.00     Pack years: 20.00     Types: Cigarettes     Quit date: 2016     Years since quittin.0    Smokeless tobacco: Never Used   Substance and Sexual Activity    Alcohol use: Yes     Frequency: Monthly or less     Drinks per session: 1 or 2     Binge frequency: Never     Comment: rare- holiday    Drug use: No    Sexual activity: Not Currently     Partners: Male     Comment:    Lifestyle    Physical activity     Days per week: Not on file     Minutes per session: Not on file    Stress: Not on file   Relationships    Social connections     Talks on phone: More than three times a week     Gets together: Twice a week     Attends Rastafarian service: Never     Active member of club or organization: No     Attends meetings of clubs or organizations: Never     Relationship status:    Other Topics Concern    Not on file   Social History Narrative    Not on file       Current Medications  Current Outpatient Medications on File Prior to Visit   Medication Sig Dispense Refill    albuterol sulfate (PROAIR RESPICLICK) 90 mcg/actuation AePB Inhale 2 puffs into the lungs every 4 (four) hours as  needed. Rescue 1 each 5    albuterol-ipratropium (DUO-NEB) 2.5 mg-0.5 mg/3 mL nebulizer solution Take 3 mLs by nebulization every 6 (six) hours while awake. Rescue 30 mL 5    atorvastatin (LIPITOR) 10 MG tablet Take 1 tablet (10 mg total) by mouth once daily. 90 tablet 3    carvediloL (COREG) 25 MG tablet TAKE 1 TABLET(25 MG) BY MOUTH TWICE DAILY 180 tablet 3    cetirizine (ZYRTEC) 10 MG tablet Take 10 mg by mouth every evening.       furosemide (LASIX) 40 MG tablet TAKE 1 TABLET(40 MG) BY MOUTH TWICE DAILY 180 tablet 3    lisinopril 10 MG tablet Take 1 tablet (10 mg total) by mouth once daily. (Patient taking differently: Take 10 mg by mouth as needed. ) 90 tablet 3    metFORMIN (GLUCOPHAGE) 500 MG tablet TAKE 1 TABLET BY MOUTH TWICE A DAY 60 tablet 0    multivitamin (ONE DAILY MULTIVITAMIN) per tablet Take 1 tablet by mouth once daily.      sertraline (ZOLOFT) 100 MG tablet TAKE ONE TABLET BY MOUTH EVERY MORNING (Patient taking differently: Take 100 mg by mouth every evening. ) 30 tablet 5    tiotropium (SPIRIVA WITH HANDIHALER) 18 mcg inhalation capsule Inhale 1 capsule (18 mcg total) into the lungs once daily. 30 capsule 3    TRUETEST TEST STRIPS Strp test once EVERY MORNING 100 strip 1    WIXELA INHUB 250-50 mcg/dose diskus inhaler INHALE 1 PUFF INTO THE LUNGS 2 (TWO) TIMES DAILY. 60 each 5     No current facility-administered medications on file prior to visit.        Allergies   Review of patient's allergies indicates:   Allergen Reactions    Adhesive Rash     tegaderm burns and blistered skin       Review of Systems (Pertinent positives)  Review of Systems   Constitutional: Negative.    HENT: Negative for hearing loss.    Eyes: Negative for discharge.   Respiratory: Negative for wheezing.    Cardiovascular: Negative for chest pain and palpitations.   Gastrointestinal: Negative for blood in stool, constipation, diarrhea and vomiting.   Genitourinary: Negative for dysuria and hematuria.  "  Musculoskeletal: Negative for neck pain.   Skin: Negative.    Neurological: Positive for dizziness. Negative for weakness and headaches.   Endo/Heme/Allergies: Negative for polydipsia.         BP (!) 144/84 (BP Location: Right arm, Patient Position: Sitting, BP Method: Medium (Automatic))   Pulse 78   Temp 98.4 °F (36.9 °C) (Oral)   Resp 17   Ht 5' 2.01" (1.575 m)   Wt 88.2 kg (194 lb 7.1 oz)   LMP  (LMP Unknown)   SpO2 (!) 94%   BMI 35.56 kg/m²     GENERAL APPEARANCE: in no apparent distress and well developed and well nourished  HEENT: PERRL, EOMI, Sclera clear, anicteric, Oropharynx clear, no lesions, Neck supple with midline trachea  NECK: normal, supple, no adenopathy, thyroid normal in size  RESPIRATORY: appears well, vitals normal, no respiratory distress, acyanotic, normal RR, chest clear, no wheezing, crepitations, rhonchi, normal symmetric air entry  HEART: regular rate and rhythm, S1, S2 normal, no murmur, click, rub or gallop.    ABDOMEN: abdomen is soft without tenderness, no masses, no hernias, no organomegaly, no rebound, no guarding  NEUROLOGIC: normal without focal findings, CN II-XII are intact.    Extremities: warm/well perfused.  No abnormal hair patterns.  No clubbing, cyanosis or edema.    SKIN: no rashes, no wounds, no other lesions  PSYCH: Alert, oriented x 3, thought content appropriate, speech normal, pleasant and cooperative, good eye contact, well groomed    Assessment/Plan:  Hannah Montoya is a 64 y.o. female who presents today for :    Hannah was seen today for follow-up and medication refill.    Diagnoses and all orders for this visit:    Simple chronic bronchitis  -     Comprehensive metabolic panel; Future    Essential hypertension    NICM (nonischemic cardiomyopathy)    Type 2 diabetes mellitus without complication, without long-term current use of insulin  -     Hemoglobin A1C; Future  -     Lipid Panel; Future    Seasonal allergies    Dilated cardiomyopathy  -     " Lipid Panel; Future    Left bundle-branch block    History of breast cancer in female    Close Exposure to Covid-19 Virus  -     COVID-19 (SARS CoV-2) IgG Antibody; Future    Lung mass      1.  Patient to have labs today  2.  Medications refilled  3.  Follow up with cardiology and pulmonology as scheduled  4.  Call with any cocnerns  5.  Shingles vaccine to be done at her pharmacy  6.  Follow up 6 months or sooner if needed      Emanuel Chavez MD

## 2020-08-07 DIAGNOSIS — Z95.810 PRESENCE OF AUTOMATIC (IMPLANTABLE) CARDIAC DEFIBRILLATOR: Primary | ICD-10-CM

## 2020-08-07 DIAGNOSIS — I42.8 NICM (NONISCHEMIC CARDIOMYOPATHY): ICD-10-CM

## 2020-08-07 DIAGNOSIS — I44.7 LBBB (LEFT BUNDLE BRANCH BLOCK): Primary | ICD-10-CM

## 2020-08-11 ENCOUNTER — TELEPHONE (OUTPATIENT)
Dept: FAMILY MEDICINE | Facility: CLINIC | Age: 65
End: 2020-08-11

## 2020-08-11 NOTE — TELEPHONE ENCOUNTER
Called [facility] pharmacy. Need clarification on medication instructions per PCP. Furosemide 40 mg (Lasix) once daily or twice daily? Please advise.

## 2020-08-12 ENCOUNTER — OFFICE VISIT (OUTPATIENT)
Dept: OPTOMETRY | Facility: CLINIC | Age: 65
End: 2020-08-12
Payer: MEDICARE

## 2020-08-12 DIAGNOSIS — E11.36 TYPE 2 DIABETES MELLITUS WITH DIABETIC CATARACT, WITHOUT LONG-TERM CURRENT USE OF INSULIN: Primary | ICD-10-CM

## 2020-08-12 DIAGNOSIS — H25.13 NUCLEAR SCLEROSIS OF BOTH EYES: ICD-10-CM

## 2020-08-12 DIAGNOSIS — H52.7 REFRACTIVE ERROR: ICD-10-CM

## 2020-08-12 DIAGNOSIS — H25.813 COMBINED FORMS OF AGE-RELATED CATARACT OF BOTH EYES: ICD-10-CM

## 2020-08-12 LAB
LEFT EYE DM RETINOPATHY: NEGATIVE
RIGHT EYE DM RETINOPATHY: NEGATIVE

## 2020-08-12 PROCEDURE — 99499 UNLISTED E&M SERVICE: CPT | Mod: HCNC,S$GLB,, | Performed by: OPTOMETRIST

## 2020-08-12 PROCEDURE — 92015 PR REFRACTION: ICD-10-PCS | Mod: HCNC,S$GLB,, | Performed by: OPTOMETRIST

## 2020-08-12 PROCEDURE — 92015 DETERMINE REFRACTIVE STATE: CPT | Mod: HCNC,S$GLB,, | Performed by: OPTOMETRIST

## 2020-08-12 PROCEDURE — 99999 PR PBB SHADOW E&M-EST. PATIENT-LVL III: ICD-10-PCS | Mod: PBBFAC,HCNC,, | Performed by: OPTOMETRIST

## 2020-08-12 PROCEDURE — 92014 COMPRE OPH EXAM EST PT 1/>: CPT | Mod: HCNC,S$GLB,, | Performed by: OPTOMETRIST

## 2020-08-12 PROCEDURE — 99499 RISK ADDL DX/OHS AUDIT: ICD-10-PCS | Mod: HCNC,S$GLB,, | Performed by: OPTOMETRIST

## 2020-08-12 PROCEDURE — 92014 PR EYE EXAM, EST PATIENT,COMPREHESV: ICD-10-PCS | Mod: HCNC,S$GLB,, | Performed by: OPTOMETRIST

## 2020-08-12 PROCEDURE — 99999 PR PBB SHADOW E&M-EST. PATIENT-LVL III: CPT | Mod: PBBFAC,HCNC,, | Performed by: OPTOMETRIST

## 2020-08-12 NOTE — PROGRESS NOTES
Subjective:       Patient ID: Hannah Montoya is a 64 y.o. female      Chief Complaint   Patient presents with    Concerns About Ocular Health    Diabetic Eye Exam     History of Present Illness  Dls: 9/27/18 Dr. Juarez     63 y/o female presents today for diabetic eye exam.   Pt states has to take glasses off to read. Pt wears pal's.      x 1 day     No tearing  No itching  No burning  No pain  No ha's  No floaters  No flashes    Eye meds  None    Hemoglobin A1C       Date                     Value               Ref Range           Status                08/03/2020               7.0 (H)             4.0 - 5.6 %         Final               02/03/2020               7.0 (H)             4.0 - 5.6 %         Final                   05/24/2019               6.1 (H)             4.0 - 5.6 %         Final       Assessment/Plan:     1. Type 2 diabetes mellitus with diabetic cataract, without long-term current use of insulin  No diabetic retinopathy. Discussed with pt the effects of diabetes on vision, importance of good blood sugar control, compliance with meds, and follow up care with PCP. Return in 1 year for dilated eye exam, sooner PRN.    2. Combined forms of age-related cataract of both eyes  3. Nuclear sclerosis of both eyes  Educated pt on presence of cataracts and effects on vision. No surgery at this time. Recheck in one year.    4. Refractive error  Educated patient on refractive error and discussed lens options. Dispensed updated spectacle Rx. Educated about adaptation period to new specs.    Eyeglass Final Rx     Eyeglass Final Rx       Sphere Cylinder Axis Add    Right -4.00 +1.75 105 +2.75    Left -4.00 +1.75 065 +2.75    Expiration Date: 8/13/2021                  Follow up in about 1 year (around 8/12/2021) for Diabetic Eye Exam.

## 2020-08-12 NOTE — TELEPHONE ENCOUNTER
Called pt and informed per [facility] that Lasix prescription was clarified and will be shipped today. Pt verbalized understanding.

## 2020-08-12 NOTE — TELEPHONE ENCOUNTER
Called [facility] rep Ronqu[place] and informed per PCP prescription for pt is Furosemide 40 mg (Lasix) , one tablet, once a day. [facility] will ship pt's Lasix medication today.

## 2020-08-12 NOTE — PATIENT INSTRUCTIONS
Correcting Presbyopia: Glasses    Glasses can correct presbyopia. They focus the image back onto the retina. This way, you can see an object clearly. There are several kinds of glasses you can choose from.    Glasses  Presbyopia is most often corrected by wearing glasses. If you have no other vision problems, you may only need reading glasses. If you are also nearsighted or farsighted, your eye doctor can prescribe bifocals, trifocals, or progressive lenses.        Bifocals correct near and far vision (bimeans two). A small half-Jamul in the lower part of the lens magnifies objects that are close. In some cases, the whole lower half of the lens magnifies these objects.        Trifocals correct near, middle, and far vision (tri means three). The lower part of the lens has two magnifying viveros. One magnifies near objects. The other magnifies objects that are about an arms length away.        Progressive lenses change magnifying power from near to middle to far vision gradually. You do not notice a change from one power to the next. And you do not see any lines on the lenses. But the sides of the lenses will be blurry.    © 3920-1831 [email or web address]. [address] [place] [address], [place] [address]. All rights reserved. This information is not intended as a substitute for professional medical care. Always follow your healthcare professional's instructions.

## 2020-08-20 ENCOUNTER — PATIENT MESSAGE (OUTPATIENT)
Dept: FAMILY MEDICINE | Facility: CLINIC | Age: 65
End: 2020-08-20

## 2020-08-20 DIAGNOSIS — J44.1 COPD WITH ACUTE EXACERBATION: ICD-10-CM

## 2020-08-21 RX ORDER — FLUTICASONE PROPIONATE AND SALMETEROL 250; 50 UG/1; UG/1
POWDER RESPIRATORY (INHALATION)
Qty: 60 EACH | Refills: 5 | Status: SHIPPED | OUTPATIENT
Start: 2020-08-21 | End: 2020-12-14

## 2020-09-03 ENCOUNTER — TELEPHONE (OUTPATIENT)
Dept: FAMILY MEDICINE | Facility: CLINIC | Age: 65
End: 2020-09-03

## 2020-09-03 DIAGNOSIS — Z12.31 BREAST CANCER SCREENING BY MAMMOGRAM: Primary | ICD-10-CM

## 2020-09-03 NOTE — TELEPHONE ENCOUNTER
----- Message from Teri Wong sent at 9/3/2020  3:49 PM CDT -----  Contact: patient  Name Of Caller:  Hannah     Provider Name:  Page     Does patient feel the need to be seen today? No     Relationship to the Pt?:  Patient     Contact Preference?: [phone]    What is the nature of the call?: Patient called to schedule the mammogram but it wont let me schedule due to the date     You will need to put in new orders so the insurance can cover the mammogram     Please call patient back

## 2020-09-04 ENCOUNTER — OFFICE VISIT (OUTPATIENT)
Dept: FAMILY MEDICINE | Facility: CLINIC | Age: 65
End: 2020-09-04
Payer: MEDICARE

## 2020-09-04 DIAGNOSIS — F32.9 REACTIVE DEPRESSION: Primary | ICD-10-CM

## 2020-09-04 PROCEDURE — 99499 RISK ADDL DX/OHS AUDIT: ICD-10-PCS | Mod: 95,,, | Performed by: FAMILY MEDICINE

## 2020-09-04 PROCEDURE — 99499 UNLISTED E&M SERVICE: CPT | Mod: 95,,, | Performed by: FAMILY MEDICINE

## 2020-09-04 PROCEDURE — 99214 OFFICE O/P EST MOD 30 MIN: CPT | Mod: 95,,, | Performed by: FAMILY MEDICINE

## 2020-09-04 PROCEDURE — 99214 PR OFFICE/OUTPT VISIT, EST, LEVL IV, 30-39 MIN: ICD-10-PCS | Mod: 95,,, | Performed by: FAMILY MEDICINE

## 2020-09-04 RX ORDER — BUPROPION HYDROCHLORIDE 150 MG/1
150 TABLET ORAL DAILY
Qty: 30 TABLET | Refills: 0 | Status: SHIPPED | OUTPATIENT
Start: 2020-09-04 | End: 2020-09-27

## 2020-09-04 NOTE — PROGRESS NOTES
The patient location is: home  The chief complaint leading to consultation is: depression    Visit type: audiovisual    Face to Face time with patient: 15 minutes of total time spent on the encounter, which includes face to face time and non-face to face time preparing to see the patient (eg, review of tests), Obtaining and/or reviewing separately obtained history, Documenting clinical information in the electronic or other health record, Independently interpreting results (not separately reported) and communicating results to the patient/family/caregiver, or Care coordination (not separately reported).         Each patient to whom he or she provides medical services by telemedicine is:  (1) informed of the relationship between the physician and patient and the respective role of any other health care provider with respect to management of the patient; and (2) notified that he or she may decline to receive medical services by telemedicine and may withdraw from such care at any time.    Notes:     Routine Office Visit    Patient Name: Hannah Montoya    : 1955  MRN: [id number]    Subjective:  Hannah is a 65 y.o. female who presents today for:    1. Depression  Patient presenting today with symptoms of depression and agoraphobia that has worsened since quarantine.  She sates that she no longer wants to leave the house much and is content staying at home napping or watching TV.  She states she is sleeping too much.  She states she will sleep up to 12 hours at night and then wants a nap in the afternoon.  She doesn't eat as much as she was.  She states that ll the sleeping has thrown off her eating schedule.  She talks to her sister in law and daughter daily, but states they run out of things talk about.  She rarely sees her grandkids due to covid.     Past Medical History  Past Medical History:   Diagnosis Date    AICD (automatic cardioverter/defibrillator) present     Asthma     bronchitis in past     Breast cancer 2016    right    Cardiac pacemaker     Cardiomyopathy     COPD (chronic obstructive pulmonary disease)     Diabetes mellitus, type 2     Hyperglycemia     Hyperlipidemia     Hypertension     Malignant neoplasm of overlapping sites of female breast 2016    Nuclear sclerosis of both eyes 2020    Respiratory distress 3/12/2020       Past Surgical History  Past Surgical History:   Procedure Laterality Date    BREAST BIOPSY Right 2016    [facility]    BREAST LUMPECTOMY Right     CARDIAC CATHETERIZATION Bilateral 2017    CARDIAC DEFIBRILLATOR PLACEMENT Left 08/10/2017    CARDIAC DEFIBRILLATOR PLACEMENT Left 2018     SECTION      x2    CHOLECYSTECTOMY      LUNG BIOPSY N/A 2020    Procedure: BIOPSY, LUNG;  Surgeon: [facility] Diagnostic Provider;  Location: [facility] OR;  Service: Radiology;  Laterality: N/A;  8AM START  RN PREOP 2020---COVID NEGATIVE    REVISION OF IMPLANTABLE CARDIOVERTER-DEFIBRILLATOR (ICD) ELECTRODE LEAD PLACEMENT N/A 6/15/2018    Procedure: REVISION-LEAD-ICD;  Surgeon: Javy Gates MD;  Location: [facility] CATH LAB;  Service: Cardiology;  Laterality: N/A;  LBBB, Bi-V ICD HIS Ld rev, MDT, Choice, MB, 3 Prep    TUBAL LIGATION         Family History  Family History   Problem Relation Age of Onset    Kidney disease Mother     Cataracts Mother     Kidney disease Father     Cataracts Father     Clotting disorder Brother     No Known Problems Daughter     No Known Problems Son     No Known Problems Sister     No Known Problems Maternal Aunt     No Known Problems Maternal Uncle     No Known Problems Paternal Aunt     No Known Problems Paternal Uncle     Macular degeneration Maternal Grandmother     No Known Problems Maternal Grandfather     No Known Problems Paternal Grandmother     No Known Problems Paternal Grandfather     Breast cancer Neg Hx     Ovarian cancer Neg Hx     Amblyopia Neg Hx     Blindness Neg Hx     Cancer Neg Hx      Diabetes Neg Hx     Glaucoma Neg Hx     Hypertension Neg Hx     Retinal detachment Neg Hx     Strabismus Neg Hx     Stroke Neg Hx     Thyroid disease Neg Hx        Social History  Social History     Socioeconomic History    Marital status:      Spouse name: Not on file    Number of children: Not on file    Years of education: Not on file    Highest education level: Not on file   Occupational History     Employer: [employer]   Social Needs    Financial resource strain: Not hard at all    Food insecurity     Worry: Never true     Inability: Never true    Transportation needs     Medical: No     Non-medical: No   Tobacco Use    Smoking status: Former Smoker     Packs/day: 0.50     Years: 40.00     Pack years: 20.00     Types: Cigarettes     Quit date: 2016     Years since quittin.0    Smokeless tobacco: Never Used   Substance and Sexual Activity    Alcohol use: Yes     Frequency: Monthly or less     Drinks per session: 1 or 2     Binge frequency: Never     Comment: rare- holiday    Drug use: No    Sexual activity: Not Currently     Partners: Male     Comment:    Lifestyle    Physical activity     Days per week: Not on file     Minutes per session: Not on file    Stress: Not on file   Relationships    Social connections     Talks on phone: More than three times a week     Gets together: Twice a week     Attends Latter day service: Never     Active member of club or organization: No     Attends meetings of clubs or organizations: Never     Relationship status:    Other Topics Concern    Not on file   Social History Narrative    Not on file       Current Medications  Current Outpatient Medications on File Prior to Visit   Medication Sig Dispense Refill    albuterol sulfate (PROAIR RESPICLICK) 90 mcg/actuation AePB Inhale 2 puffs into the lungs every 4 (four) hours as needed. Rescue 1 each 5    albuterol-ipratropium (DUO-NEB) 2.5 mg-0.5 mg/3 mL nebulizer  solution Take 3 mLs by nebulization every 6 (six) hours while awake. Rescue 30 mL 5    atorvastatin (LIPITOR) 10 MG tablet Take 1 tablet (10 mg total) by mouth once daily. 90 tablet 3    carvediloL (COREG) 25 MG tablet TAKE 1 TABLET(25 MG) BY MOUTH TWICE DAILY 180 tablet 3    cetirizine (ZYRTEC) 10 MG tablet Take 10 mg by mouth every evening.       fluticasone-salmeterol diskus inhaler 250-50 mcg INHALE 1 PUFF INTO THE LUNGS 2 (TWO) TIMES DAILY. 60 each 5    furosemide (LASIX) 40 MG tablet Take 1 tablet (40 mg total) by mouth once daily. TAKE 1 TABLET(40 MG) BY MOUTH TWICE DAILY 90 tablet 1    lisinopril 10 MG tablet Take 1 tablet (10 mg total) by mouth once daily. (Patient taking differently: Take 10 mg by mouth as needed. ) 90 tablet 3    metFORMIN (GLUCOPHAGE) 500 MG tablet Take 1 tablet (500 mg total) by mouth 2 (two) times daily. 180 tablet 1    multivitamin (ONE DAILY MULTIVITAMIN) per tablet Take 1 tablet by mouth once daily.      sertraline (ZOLOFT) 100 MG tablet Take 1 tablet (100 mg total) by mouth every evening. 90 tablet 1    tiotropium (SPIRIVA WITH HANDIHALER) 18 mcg inhalation capsule Inhale 1 capsule (18 mcg total) into the lungs once daily. 30 capsule 3    TRUETEST TEST STRIPS Strp test once EVERY MORNING 100 strip 1     No current facility-administered medications on file prior to visit.        Allergies   Review of patient's allergies indicates:   Allergen Reactions    Adhesive Rash     tegaderm burns and blistered skin       Review of Systems (Pertinent positives)  Review of Systems   Constitutional: Negative.    HENT: Negative.    Eyes: Negative.    Respiratory: Negative.    Cardiovascular: Negative.    Genitourinary: Negative.    Skin: Negative.    Psychiatric/Behavioral: Positive for depression. Negative for hallucinations, substance abuse and suicidal ideas. The patient is not nervous/anxious and does not have insomnia.          LMP  (LMP Unknown)     GENERAL APPEARANCE: in no  apparent distress and well developed and well nourished  HEENT: PERRL, EOMI, Sclera clear, anicteric, Oropharynx clear  RESPIRATORY: appears well, vitals normal, no respiratory distress, acyanotic, normal RR  HEART: spontaneously beating.    SKIN: no visible rashes, no wounds, no other lesions  PSYCH: Alert, oriented x 3, thought content appropriate, speech normal, pleasant and cooperative, good eye contact, well groomed    Assessment/Plan:  Hannah Montoya is a 65 y.o. female who presents today for :    Diagnoses and all orders for this visit:    Reactive depression  -     buPROPion (WELLBUTRIN XL) 150 MG TB24 tablet; Take 1 tablet (150 mg total) by mouth once daily.      1.  Will add wellbutrin to her current medication regimen  2.  She is to call with any adverse reaction  3.  She was told to stop should she begin to have adverse symptoms including anxiety  4.  Follow up 4 weeks or sooner if needed  5.  She is to call 911 or go to the ED for any SI/HI or AH/VH        Emanuel Chavez MD

## 2020-09-10 ENCOUNTER — HOSPITAL ENCOUNTER (OUTPATIENT)
Dept: RADIOLOGY | Facility: HOSPITAL | Age: 65
Discharge: HOME | End: 2020-09-10
Attending: FAMILY MEDICINE
Payer: MEDICARE

## 2020-09-10 DIAGNOSIS — Z12.31 BREAST CANCER SCREENING BY MAMMOGRAM: ICD-10-CM

## 2020-09-10 PROCEDURE — 77063 BREAST TOMOSYNTHESIS BI: CPT | Mod: 26,,, | Performed by: RADIOLOGY

## 2020-09-10 PROCEDURE — 77067 SCR MAMMO BI INCL CAD: CPT | Mod: TC

## 2020-09-10 PROCEDURE — 77063 MAMMO DIGITAL SCREENING BILAT WITH TOMOSYNTHESIS_CAD: ICD-10-PCS | Mod: 26,,, | Performed by: RADIOLOGY

## 2020-09-10 PROCEDURE — 77067 SCR MAMMO BI INCL CAD: CPT | Mod: 26,,, | Performed by: RADIOLOGY

## 2020-09-10 PROCEDURE — 77067 MAMMO DIGITAL SCREENING BILAT WITH TOMOSYNTHESIS_CAD: ICD-10-PCS | Mod: 26,,, | Performed by: RADIOLOGY

## 2020-09-16 ENCOUNTER — PATIENT OUTREACH (OUTPATIENT)
Dept: ADMINISTRATIVE | Facility: HOSPITAL | Age: 65
End: 2020-09-16

## 2020-09-16 ENCOUNTER — HOSPITAL ENCOUNTER (OUTPATIENT)
Dept: RADIOLOGY | Facility: HOSPITAL | Age: 65
Discharge: HOME | End: 2020-09-16
Attending: INTERNAL MEDICINE
Payer: MEDICARE

## 2020-09-16 DIAGNOSIS — R91.1 SOLITARY PULMONARY NODULE: ICD-10-CM

## 2020-09-16 PROCEDURE — 71250 CT THORAX DX C-: CPT | Mod: TC,HCNC

## 2020-09-16 PROCEDURE — 71250 CT CHEST WITHOUT CONTRAST: ICD-10-PCS | Mod: 26,HCNC,, | Performed by: RADIOLOGY

## 2020-09-16 PROCEDURE — 71250 CT THORAX DX C-: CPT | Mod: 26,HCNC,, | Performed by: RADIOLOGY

## 2020-09-17 ENCOUNTER — CLINICAL SUPPORT (OUTPATIENT)
Dept: FAMILY MEDICINE | Facility: CLINIC | Age: 65
End: 2020-09-17
Payer: MEDICARE

## 2020-09-17 ENCOUNTER — PATIENT MESSAGE (OUTPATIENT)
Dept: ADMINISTRATIVE | Facility: HOSPITAL | Age: 65
End: 2020-09-17

## 2020-09-17 VITALS — OXYGEN SATURATION: 94 % | DIASTOLIC BLOOD PRESSURE: 76 MMHG | SYSTOLIC BLOOD PRESSURE: 132 MMHG | HEART RATE: 78 BPM

## 2020-09-17 DIAGNOSIS — I10 ESSENTIAL HYPERTENSION: Primary | ICD-10-CM

## 2020-09-17 PROCEDURE — 99999 PR PBB SHADOW E&M-EST. PATIENT-LVL II: CPT | Mod: PBBFAC,HCNC,,

## 2020-09-17 PROCEDURE — 99999 PR PBB SHADOW E&M-EST. PATIENT-LVL II: ICD-10-PCS | Mod: PBBFAC,HCNC,,

## 2020-09-17 PROCEDURE — 99499 UNLISTED E&M SERVICE: CPT | Mod: HCNC,S$GLB,, | Performed by: FAMILY MEDICINE

## 2020-09-17 PROCEDURE — 99499 NO LOS: ICD-10-PCS | Mod: HCNC,S$GLB,, | Performed by: FAMILY MEDICINE

## 2020-09-17 NOTE — PROGRESS NOTES
.  Hannah Montoya 65 y.o. female is here today for Blood Pressure check.   History of HTN yes.    Review of patient's allergies indicates:   Allergen Reactions    Adhesive Rash     tegaderm burns and blistered skin     Creatinine   Date Value Ref Range Status   08/03/2020 1.1 0.5 - 1.4 mg/dL Final     Sodium   Date Value Ref Range Status   08/03/2020 139 136 - 145 mmol/L Final     Potassium   Date Value Ref Range Status   08/03/2020 4.1 3.5 - 5.1 mmol/L Final   ]  Patient verifies taking blood pressure medications on a regular basis at the same time of the day.     Current Outpatient Medications:     albuterol sulfate (PROAIR RESPICLICK) 90 mcg/actuation AePB, Inhale 2 puffs into the lungs every 4 (four) hours as needed. Rescue, Disp: 1 each, Rfl: 5    albuterol-ipratropium (DUO-NEB) 2.5 mg-0.5 mg/3 mL nebulizer solution, Take 3 mLs by nebulization every 6 (six) hours while awake. Rescue, Disp: 30 mL, Rfl: 5    atorvastatin (LIPITOR) 10 MG tablet, Take 1 tablet (10 mg total) by mouth once daily., Disp: 90 tablet, Rfl: 3    buPROPion (WELLBUTRIN XL) 150 MG TB24 tablet, Take 1 tablet (150 mg total) by mouth once daily., Disp: 30 tablet, Rfl: 0    carvediloL (COREG) 25 MG tablet, TAKE 1 TABLET(25 MG) BY MOUTH TWICE DAILY, Disp: 180 tablet, Rfl: 3    cetirizine (ZYRTEC) 10 MG tablet, Take 10 mg by mouth every evening. , Disp: , Rfl:     fluticasone-salmeterol diskus inhaler 250-50 mcg, INHALE 1 PUFF INTO THE LUNGS 2 (TWO) TIMES DAILY., Disp: 60 each, Rfl: 5    furosemide (LASIX) 40 MG tablet, Take 1 tablet (40 mg total) by mouth once daily. TAKE 1 TABLET(40 MG) BY MOUTH TWICE DAILY, Disp: 90 tablet, Rfl: 1    lisinopril 10 MG tablet, Take 1 tablet (10 mg total) by mouth once daily. (Patient taking differently: Take 10 mg by mouth as needed. ), Disp: 90 tablet, Rfl: 3    metFORMIN (GLUCOPHAGE) 500 MG tablet, Take 1 tablet (500 mg total) by mouth 2 (two) times daily., Disp: 180 tablet, Rfl: 1     multivitamin (ONE DAILY MULTIVITAMIN) per tablet, Take 1 tablet by mouth once daily., Disp: , Rfl:     sertraline (ZOLOFT) 100 MG tablet, Take 1 tablet (100 mg total) by mouth every evening., Disp: 90 tablet, Rfl: 1    tiotropium (SPIRIVA WITH HANDIHALER) 18 mcg inhalation capsule, Inhale 1 capsule (18 mcg total) into the lungs once daily., Disp: 30 capsule, Rfl: 3    TRUETEST TEST STRIPS Strp, test once EVERY MORNING, Disp: 100 strip, Rfl: 1  Does patient have record of home blood pressure readings yes. Readings have been averaging 150/80.   Last dose of blood pressure medication was taken at 8:00AM.  Patient is asymptomatic.   Complains of no complaints.    BP: 132/76 , Pulse: 78 .    Blood pressure reading after 15 minutes was not repeated  Dr. Chavez notified.

## 2020-09-18 ENCOUNTER — TELEPHONE (OUTPATIENT)
Dept: PULMONOLOGY | Facility: CLINIC | Age: 65
End: 2020-09-18

## 2020-09-18 ENCOUNTER — PATIENT OUTREACH (OUTPATIENT)
Dept: ADMINISTRATIVE | Facility: OTHER | Age: 65
End: 2020-09-18

## 2020-09-18 NOTE — TELEPHONE ENCOUNTER
----- Message from Lang Cannon MD sent at 9/17/2020 10:51 AM CDT -----  Enlarging rll lobulated nodule.  May need pet scan and possible resection.  Please schedule clinic follow up in 1 week to discuss ct result.  Ok to overbook.

## 2020-09-21 ENCOUNTER — OFFICE VISIT (OUTPATIENT)
Dept: PULMONOLOGY | Facility: CLINIC | Age: 65
End: 2020-09-21
Payer: MEDICARE

## 2020-09-21 VITALS
BODY MASS INDEX: 35.73 KG/M2 | OXYGEN SATURATION: 94 % | SYSTOLIC BLOOD PRESSURE: 137 MMHG | HEART RATE: 86 BPM | WEIGHT: 194.13 LBS | DIASTOLIC BLOOD PRESSURE: 79 MMHG | TEMPERATURE: 98 F | HEIGHT: 62 IN

## 2020-09-21 DIAGNOSIS — R91.8 LUNG MASS: ICD-10-CM

## 2020-09-21 DIAGNOSIS — C34.90 MALIGNANT NEOPLASM OF UNSPECIFIED PART OF UNSPECIFIED BRONCHUS OR LUNG: ICD-10-CM

## 2020-09-21 DIAGNOSIS — Z12.11 SPECIAL SCREENING FOR MALIGNANT NEOPLASM OF COLON: Primary | ICD-10-CM

## 2020-09-21 DIAGNOSIS — J41.0 SIMPLE CHRONIC BRONCHITIS: ICD-10-CM

## 2020-09-21 PROCEDURE — 99999 PR PBB SHADOW E&M-EST. PATIENT-LVL V: CPT | Mod: PBBFAC,HCNC,, | Performed by: INTERNAL MEDICINE

## 2020-09-21 PROCEDURE — 99214 PR OFFICE/OUTPT VISIT, EST, LEVL IV, 30-39 MIN: ICD-10-PCS | Mod: HCNC,S$GLB,, | Performed by: INTERNAL MEDICINE

## 2020-09-21 PROCEDURE — 3008F BODY MASS INDEX DOCD: CPT | Mod: HCNC,CPTII,S$GLB, | Performed by: INTERNAL MEDICINE

## 2020-09-21 PROCEDURE — 3078F DIAST BP <80 MM HG: CPT | Mod: HCNC,CPTII,S$GLB, | Performed by: INTERNAL MEDICINE

## 2020-09-21 PROCEDURE — 99999 PR PBB SHADOW E&M-EST. PATIENT-LVL V: ICD-10-PCS | Mod: PBBFAC,HCNC,, | Performed by: INTERNAL MEDICINE

## 2020-09-21 PROCEDURE — 99214 OFFICE O/P EST MOD 30 MIN: CPT | Mod: HCNC,S$GLB,, | Performed by: INTERNAL MEDICINE

## 2020-09-21 PROCEDURE — 3078F PR MOST RECENT DIASTOLIC BLOOD PRESSURE < 80 MM HG: ICD-10-PCS | Mod: HCNC,CPTII,S$GLB, | Performed by: INTERNAL MEDICINE

## 2020-09-21 PROCEDURE — 3008F PR BODY MASS INDEX (BMI) DOCUMENTED: ICD-10-PCS | Mod: HCNC,CPTII,S$GLB, | Performed by: INTERNAL MEDICINE

## 2020-09-21 PROCEDURE — 3075F SYST BP GE 130 - 139MM HG: CPT | Mod: HCNC,CPTII,S$GLB, | Performed by: INTERNAL MEDICINE

## 2020-09-21 PROCEDURE — 3075F PR MOST RECENT SYSTOLIC BLOOD PRESS GE 130-139MM HG: ICD-10-PCS | Mod: HCNC,CPTII,S$GLB, | Performed by: INTERNAL MEDICINE

## 2020-09-21 PROCEDURE — 99499 RISK ADDL DX/OHS AUDIT: ICD-10-PCS | Mod: HCNC,S$GLB,, | Performed by: INTERNAL MEDICINE

## 2020-09-21 PROCEDURE — 99499 UNLISTED E&M SERVICE: CPT | Mod: HCNC,S$GLB,, | Performed by: INTERNAL MEDICINE

## 2020-09-21 NOTE — ASSESSMENT & PLAN NOTE
In light of increasing size, resection is recommended.  Will send for pet and refer to ct surgery.

## 2020-09-21 NOTE — PROGRESS NOTES
Hannah Montoya  was seen as a follow up.    CHIEF COMPLAINT:  Results (CT results) and Pulmonary Nodules      HISTORY OF PRESENT ILLNESS: Hannah Montoya is a 65 y.o. female  has a past medical history of AICD (automatic cardioverter/defibrillator) present, Asthma, Breast cancer (2016), Cardiac pacemaker, Cardiomyopathy, COPD (chronic obstructive pulmonary disease), Diabetes mellitus, type 2, Hyperglycemia, Hyperlipidemia, Hypertension, Malignant neoplasm of overlapping sites of female breast (2/12/2016), Nuclear sclerosis of both eyes (8/12/2020), and Respiratory distress (3/12/2020).  Our first encounter was 5/8/20.  Patient smoked .5 ppd x 40 years.  Patient quit in 2016.  Patient presented to ED 3/11/20 with sob, nasal congestion.  covid 19 was negative. Patient was treated with antibiotics and prednisone with improvement in symptoms.  Ct mentioned 3.2 cm rll mass.      S/p ct guided biopsy 5/28/20 was negative for malignancy.  The plan was to repeat ct scan.  S/p repeat ct on 9/16/20.      No new issue since last visit.  Today, patient denied fever/chills.  No coughing.  No hemoptysis.  No weight loss.  Poor appetite in which patient is attributed to stress of social distancing.  In addition, patient is active with house choir without issue.  No chest pain.  No orthopnea.  No pnd.     PAST MEDICAL HISTORY:    Active Ambulatory Problems     Diagnosis Date Noted    Essential hypertension 10/07/2014    COPD (chronic obstructive pulmonary disease) 04/24/2015    Mild protein malnutrition 07/05/2016    Elevated troponin 07/05/2016    Type 2 diabetes mellitus without complication 07/05/2016    Seasonal allergies 03/23/2017    Left bundle-branch block 08/14/2017    Dilated cardiomyopathy 04/03/2018    NICM (nonischemic cardiomyopathy) 05/02/2018    Malfunction of implantable defibrillator ventricular (ICD) lead 06/15/2018    Pain, dental 03/17/2019    Cardiac resynchronization therapy defibrillator  (CRT-D) in place 2019    History of breast cancer in female 2020    Lung mass 2020    Refractive error 2020    Nuclear sclerosis of both eyes 2020    Reactive depression 2020     Resolved Ambulatory Problems     Diagnosis Date Noted    Tobacco abuse 2015    Malignant neoplasm of overlapping sites of female breast 2016    Breast CA 2016    Acute exacerbation of chronic obstructive pulmonary disease (COPD) 2016    Acute respiratory failure with hypoxia and hypercapnia 2016    Metabolic acidosis 2016    Sepsis 2016    Acute diastolic congestive heart failure 2017    Dehydration     Acute systolic congestive heart failure 2017    Hypoxia 2020    Respiratory distress 2020    Pneumonia due to infectious organism 2020    COPD exacerbation 2020     Past Medical History:   Diagnosis Date    AICD (automatic cardioverter/defibrillator) present     Asthma     Breast cancer     Cardiac pacemaker     Cardiomyopathy     Diabetes mellitus, type 2     Hyperglycemia     Hyperlipidemia     Hypertension                 PAST SURGICAL HISTORY:    Past Surgical History:   Procedure Laterality Date    BREAST BIOPSY Right 2016    [facility]    BREAST LUMPECTOMY Right     CARDIAC CATHETERIZATION Bilateral 2017    CARDIAC DEFIBRILLATOR PLACEMENT Left 08/10/2017    CARDIAC DEFIBRILLATOR PLACEMENT Left 2018     SECTION      x2    CHOLECYSTECTOMY      LUNG BIOPSY N/A 2020    Procedure: BIOPSY, LUNG;  Surgeon: [facility] Diagnostic Provider;  [facility]: [facility] OR;  Service: Radiology;  Laterality: N/A;  8AM START  RN PREOP 2020---COVID NEGATIVE    REVISION OF IMPLANTABLE CARDIOVERTER-DEFIBRILLATOR (ICD) ELECTRODE LEAD PLACEMENT N/A 6/15/2018    Procedure: REVISION-LEAD-ICD;  Surgeon: Javy Gates MD;  Location: [facility] CATH LAB;  Service: Cardiology;  Laterality: N/A;  LBBB,  Bi-V ICD HIS Ld rev, MDT, Choice, MB, 3 Prep    TUBAL LIGATION           FAMILY HISTORY:                Family History   Problem Relation Age of Onset    Kidney disease Mother     Cataracts Mother     Kidney disease Father     Cataracts Father     Clotting disorder Brother     No Known Problems Daughter     No Known Problems Son     No Known Problems Sister     No Known Problems Maternal Aunt     No Known Problems Maternal Uncle     No Known Problems Paternal Aunt     No Known Problems Paternal Uncle     Macular degeneration Maternal Grandmother     No Known Problems Maternal Grandfather     No Known Problems Paternal Grandmother     No Known Problems Paternal Grandfather     Breast cancer Neg Hx     Ovarian cancer Neg Hx     Amblyopia Neg Hx     Blindness Neg Hx     Cancer Neg Hx     Diabetes Neg Hx     Glaucoma Neg Hx     Hypertension Neg Hx     Retinal detachment Neg Hx     Strabismus Neg Hx     Stroke Neg Hx     Thyroid disease Neg Hx        SOCIAL HISTORY:          Tobacco:   Social History     Tobacco Use   Smoking Status Former Smoker    Packs/day: 0.50    Years: 40.00    Pack years: 20.00    Types: Cigarettes    Quit date: 2016    Years since quittin.1   Smokeless Tobacco Never Used     alcohol use:    Social History     Substance and Sexual Activity   Alcohol Use Yes    Frequency: Monthly or less    Drinks per session: 1 or 2    Binge frequency: Never    Comment: rare- holiday               Occupation:  Former     ALLERGIES:    Review of patient's allergies indicates:   Allergen Reactions    Adhesive Rash     tegaderm burns and blistered skin       CURRENT MEDICATIONS:    Current Outpatient Medications   Medication Sig Dispense Refill    albuterol sulfate (PROAIR RESPICLICK) 90 mcg/actuation AePB Inhale 2 puffs into the lungs every 4 (four) hours as needed. Rescue 1 each 5    albuterol-ipratropium (DUO-NEB) 2.5 mg-0.5 mg/3 mL nebulizer solution  Take 3 mLs by nebulization every 6 (six) hours while awake. Rescue 30 mL 5    atorvastatin (LIPITOR) 10 MG tablet Take 1 tablet (10 mg total) by mouth once daily. 90 tablet 3    buPROPion (WELLBUTRIN XL) 150 MG TB24 tablet Take 1 tablet (150 mg total) by mouth once daily. 30 tablet 0    carvediloL (COREG) 25 MG tablet TAKE 1 TABLET(25 MG) BY MOUTH TWICE DAILY 180 tablet 3    cetirizine (ZYRTEC) 10 MG tablet Take 10 mg by mouth every evening.       fluticasone-salmeterol diskus inhaler 250-50 mcg INHALE 1 PUFF INTO THE LUNGS 2 (TWO) TIMES DAILY. 60 each 5    furosemide (LASIX) 40 MG tablet Take 1 tablet (40 mg total) by mouth once daily. TAKE 1 TABLET(40 MG) BY MOUTH TWICE DAILY 90 tablet 1    metFORMIN (GLUCOPHAGE) 500 MG tablet Take 1 tablet (500 mg total) by mouth 2 (two) times daily. 180 tablet 1    multivitamin (ONE DAILY MULTIVITAMIN) per tablet Take 1 tablet by mouth once daily.      sertraline (ZOLOFT) 100 MG tablet Take 1 tablet (100 mg total) by mouth every evening. 90 tablet 1    TRUETEST TEST STRIPS Strp test once EVERY MORNING 100 strip 1    lisinopril 10 MG tablet Take 1 tablet (10 mg total) by mouth once daily. (Patient taking differently: Take 10 mg by mouth as needed. ) 90 tablet 3    tiotropium (SPIRIVA WITH HANDIHALER) 18 mcg inhalation capsule Inhale 1 capsule (18 mcg total) into the lungs once daily. 30 capsule 3     No current facility-administered medications for this visit.                   REVIEW OF SYSTEMS:     Pulmonary related symptoms as per HPI.  Gen:  + weight loss, no fever, no night sweat  HEENT:  no visual changes, no sore throat, no hearing loss  CV:  No chest pain, no orthopnea, no PND  GI:  no melena, no hematochezia, no diarhea, no constipation.  :  no dysuria, no hematuria, no hesistancy, no dribbling  Neuro:  no syncope, no vertigo, no tinitus  Psych:  No homocide or suicide ideation; no depression.  Endocrine:  No heat or cold intolerance.  Sleep:  No snoring;  "no witnessed apnea.  Feeling rested upon awake  Otherwise, a balance of systems reviewed is negative.          PHYSICAL EXAM:  Vitals:    09/21/20 1008   BP: 137/79   Pulse: 86   Temp: 97.5 °F (36.4 °C)   TempSrc: Temporal   SpO2: (!) 94%   Weight: 88 kg (194 lb 1.8 oz)   Height: 5' 2" (1.575 m)   PainSc: 0-No pain     Body mass index is 35.5 kg/m².     GENERAL:  well develop; no apparent distress  HEENT:  no nasal congestion; no discharge noted; class 3 modified mallampatti.   NECK:  supple; no palpable masses.  CARDIO: regular rate and rhythm  PULM:  clear to auscultation bilaterally; no intercostals retractions; no accessory muscle usage   ABDOMEN:  soft nontender/nondistended.  +bowel sound  EXTREMITIES no cce  NEURO:  CN II-XII intact.  5/5 motor in all extremities.  sensation grossly intact   to light touch.  PSYCH:  normal affect.  Alert and oriented x 4    LABS  Pulmonary Functions Testing Results(personally reviewed):  5/8/05 ratio 54%; fvc 79%; fev1 58%; tlc 103%; dlco 74%  ABG (personally reviewed):  none  CXR (personally reviewed):  3/11/20 rll and right perihilar hyperattenuation  CT CHEST(personally reviewed):  3/11/20 rll 3.2 lobular soft tissue density; rml atelectasis.  New when compared to 2016 ct chest.      sars 3/12/20 negative    Echo 10/16/18  CONCLUSIONS     1 - Low normal to mildly depressed left ventricular systolic function (EF 50-55%).     2 - Wall motion abnormalities.     3 - Normal left ventricular diastolic function.     4 - Normal right ventricular systolic function .     5 - Trivial aortic regurgitation.     6 - Trivial to mild mitral regurgitation.     7 - Trivial tricuspid regurgitation.     8 - Trivial pulmonic regurgitation.     9 - The estimated PA systolic pressure is 31 mmHg.     5/28/20  The findings in the biopsy suggest a reactive process. The etiology is not apparent from the biopsy,  however. Malignancy within the patient cannot be completely excluded by negative biopsy " findings.  Correlation with clinical and radiographic findings is necessary.    ASSESSMENT/PLAN  Problem List Items Addressed This Visit     COPD (chronic obstructive pulmonary disease)    Overview     fev1 58%.  Currently on advair, spiriva and duonebs.   Breathing improve since last visit.  Quit smoking 2016         Lung mass    Overview     3.2 rll cm mass and rml atelectasis.  ddx infection vs malignancy. rml atelectasis resolved but rll mass increased in in size.  S/p ct guided biopsy 5/28 that was non-diagnostic.           Current Assessment & Plan     In light of increasing size, resection is recommended.  Will send for pet and refer to ct surgery.           Relevant Orders    NM PET CT Routine Skull to Mid Thigh    Ambulatory referral/consult to Cardiothoracic Surgery      Other Visit Diagnoses     Malignant neoplasm of unspecified part of unspecified bronchus or lung        Relevant Orders    NM PET CT Routine Skull to Mid Thigh          chf - EF improved 20% to 50%      patient will No follow-ups on file. with md/np.

## 2020-09-24 DIAGNOSIS — C34.90 MALIGNANT NEOPLASM OF UNSPECIFIED PART OF UNSPECIFIED BRONCHUS OR LUNG: ICD-10-CM

## 2020-09-24 DIAGNOSIS — C34.32 MALIGNANT NEOPLASM OF LOWER LOBE, LEFT BRONCHUS OR LUNG: ICD-10-CM

## 2020-09-29 ENCOUNTER — PATIENT MESSAGE (OUTPATIENT)
Dept: OTHER | Facility: OTHER | Age: 65
End: 2020-09-29

## 2020-09-29 ENCOUNTER — HOSPITAL ENCOUNTER (OUTPATIENT)
Dept: RADIOLOGY | Facility: HOSPITAL | Age: 65
Discharge: HOME | End: 2020-09-29
Attending: PHYSICIAN ASSISTANT
Payer: MEDICARE

## 2020-09-29 DIAGNOSIS — C34.32 MALIGNANT NEOPLASM OF LOWER LOBE, LEFT BRONCHUS OR LUNG: ICD-10-CM

## 2020-09-29 DIAGNOSIS — C34.90 MALIGNANT NEOPLASM OF UNSPECIFIED PART OF UNSPECIFIED BRONCHUS OR LUNG: ICD-10-CM

## 2020-09-29 LAB — POCT GLUCOSE: 153 MG/DL (ref 70–110)

## 2020-09-29 PROCEDURE — A9552 F18 FDG: HCPCS | Mod: HCNC

## 2020-09-29 PROCEDURE — 78815 PET IMAGE W/CT SKULL-THIGH: CPT | Mod: TC,HCNC

## 2020-09-29 PROCEDURE — 78815 NM PET CT ROUTINE: ICD-10-PCS | Mod: 26,HCNC,PS, | Performed by: RADIOLOGY

## 2020-09-29 PROCEDURE — 78815 PET IMAGE W/CT SKULL-THIGH: CPT | Mod: 26,HCNC,PS, | Performed by: RADIOLOGY

## 2020-09-30 NOTE — H&P (VIEW-ONLY)
History & Physical    SUBJECTIVE:     History of Present Illness:  Patient is a 65 y.o. female former smoker with BiVAICD for NICM (recovered EF), LBBB breast cancer 2016, COPD, DM, HLD, HTN, and RLL lung mass. Presented with SOB in March. COVID negative however noted RLL mass. Treated with ABX. Repeat May 2020 with persistent mass. CT guided biopsy non diagnostic of malignancy. Repeat CT in 2020 with interval enlargement. PET with SUV 22 of RLL mass and right hilar SUV 9. No prior chest operations. Non on blood thinners.     Former smoker. 20 pack years. Quit 2016.  PSH: lumpectomy, , cholecystectomy, tubal ligation    Chief Complaint   Patient presents with    Consult       Review of patient's allergies indicates:   Allergen Reactions    Adhesive Rash     tegaderm burns and blistered skin       Current Outpatient Medications   Medication Sig Dispense Refill    albuterol sulfate (PROAIR RESPICLICK) 90 mcg/actuation AePB Inhale 2 puffs into the lungs every 4 (four) hours as needed. Rescue 1 each 5    albuterol-ipratropium (DUO-NEB) 2.5 mg-0.5 mg/3 mL nebulizer solution Take 3 mLs by nebulization every 6 (six) hours while awake. Rescue 30 mL 5    atorvastatin (LIPITOR) 10 MG tablet Take 1 tablet (10 mg total) by mouth once daily. 90 tablet 3    buPROPion (WELLBUTRIN XL) 150 MG TB24 tablet TAKE 1 TABLET BY MOUTH EVERY DAY 30 tablet 0    carvediloL (COREG) 25 MG tablet TAKE 1 TABLET(25 MG) BY MOUTH TWICE DAILY 180 tablet 3    cetirizine (ZYRTEC) 10 MG tablet Take 10 mg by mouth every evening.       fluticasone-salmeterol diskus inhaler 250-50 mcg INHALE 1 PUFF INTO THE LUNGS 2 (TWO) TIMES DAILY. 60 each 5    furosemide (LASIX) 40 MG tablet Take 1 tablet (40 mg total) by mouth once daily. TAKE 1 TABLET(40 MG) BY MOUTH TWICE DAILY 90 tablet 1    metFORMIN (GLUCOPHAGE) 500 MG tablet Take 1 tablet (500 mg total) by mouth 2 (two) times daily. 180 tablet 1    multivitamin (ONE DAILY MULTIVITAMIN)  per tablet Take 1 tablet by mouth once daily.      sertraline (ZOLOFT) 100 MG tablet Take 1 tablet (100 mg total) by mouth every evening. 90 tablet 1    TRUETEST TEST STRIPS Strp test once EVERY MORNING 100 strip 1    lisinopril 10 MG tablet Take 1 tablet (10 mg total) by mouth once daily. (Patient taking differently: Take 10 mg by mouth as needed. ) 90 tablet 3    tiotropium (SPIRIVA WITH HANDIHALER) 18 mcg inhalation capsule Inhale 1 capsule (18 mcg total) into the lungs once daily. 30 capsule 3     No current facility-administered medications for this visit.        Past Medical History:   Diagnosis Date    AICD (automatic cardioverter/defibrillator) present     Asthma     bronchitis in past    Breast cancer 2016    right    Cardiac pacemaker     Cardiomyopathy     COPD (chronic obstructive pulmonary disease)     Diabetes mellitus, type 2     Hyperglycemia     Hyperlipidemia     Hypertension     Malignant neoplasm of overlapping sites of female breast 2016    Nuclear sclerosis of both eyes 2020    Respiratory distress 3/12/2020     Past Surgical History:   Procedure Laterality Date    BREAST BIOPSY Right 2016    [facility]    BREAST LUMPECTOMY Right     CARDIAC CATHETERIZATION Bilateral 2017    CARDIAC DEFIBRILLATOR PLACEMENT Left 08/10/2017    CARDIAC DEFIBRILLATOR PLACEMENT Left 2018     SECTION      x2    CHOLECYSTECTOMY      LUNG BIOPSY N/A 2020    Procedure: BIOPSY, LUNG;  Surgeon: [facility] Diagnostic Provider;  Location: [facility] OR;  Service: Radiology;  Laterality: N/A;  8AM START  RN PREOP 2020---COVID NEGATIVE    REVISION OF IMPLANTABLE CARDIOVERTER-DEFIBRILLATOR (ICD) ELECTRODE LEAD PLACEMENT N/A 6/15/2018    Procedure: REVISION-LEAD-ICD;  Surgeon: Javy Gates MD;  Location: [facility] CATH LAB;  Service: Cardiology;  Laterality: N/A;  LBBB, Bi-V ICD HIS Ld rev, MDT, Choice, MB, 3 Prep    TUBAL LIGATION       Family History   Problem Relation Age  of Onset    Kidney disease Mother     Cataracts Mother     Kidney disease Father     Cataracts Father     Clotting disorder Brother     No Known Problems Daughter     No Known Problems Son     No Known Problems Sister     No Known Problems Maternal Aunt     No Known Problems Maternal Uncle     No Known Problems Paternal Aunt     No Known Problems Paternal Uncle     Macular degeneration Maternal Grandmother     No Known Problems Maternal Grandfather     No Known Problems Paternal Grandmother     No Known Problems Paternal Grandfather     Breast cancer Neg Hx     Ovarian cancer Neg Hx     Amblyopia Neg Hx     Blindness Neg Hx     Cancer Neg Hx     Diabetes Neg Hx     Glaucoma Neg Hx     Hypertension Neg Hx     Retinal detachment Neg Hx     Strabismus Neg Hx     Stroke Neg Hx     Thyroid disease Neg Hx      Social History     Tobacco Use    Smoking status: Former Smoker     Packs/day: 0.50     Years: 40.00     Pack years: 20.00     Types: Cigarettes     Quit date: 2016     Years since quittin.1    Smokeless tobacco: Never Used   Substance Use Topics    Alcohol use: Yes     Frequency: Monthly or less     Drinks per session: 1 or 2     Binge frequency: Never     Comment: rare- holiday    Drug use: No        Review of Systems:  Review of Systems   Constitutional: Negative for activity change, appetite change, fever and unexpected weight change.   Respiratory: Positive for shortness of breath (with exterion). Negative for cough and wheezing.    Cardiovascular: Negative for chest pain, palpitations and leg swelling.   Gastrointestinal: Negative for abdominal pain.   Genitourinary: Negative for difficulty urinating.   Musculoskeletal: Negative for arthralgias.   Skin: Negative for color change.   Neurological: Negative for dizziness and syncope.   Psychiatric/Behavioral: Negative for agitation. The patient is not nervous/anxious.        OBJECTIVE:     Vital Signs (Most Recent)  Vitals:  "   10/01/20 0839   BP: (!) 141/75   Pulse: 86   SpO2: (!) 94%   Weight: 88.9 kg (195 lb 15.8 oz)   Height: 5' 2" (1.575 m)   PainSc: 0-No pain       Physical Exam:  Physical Exam  Constitutional:       Appearance: Normal appearance.   HENT:      Head: Atraumatic.   Cardiovascular:      Rate and Rhythm: Normal rate and regular rhythm.      Pulses: Normal pulses.      Heart sounds: Normal heart sounds. No murmur.   Pulmonary:      Effort: Pulmonary effort is normal.      Breath sounds: Normal breath sounds.   Abdominal:      Palpations: Abdomen is soft.   Skin:     General: Skin is warm and dry.   Neurological:      General: No focal deficit present.      Mental Status: She is alert and oriented to person, place, and time.   Psychiatric:         Mood and Affect: Mood normal.         Behavior: Behavior normal.         Echo 2018:    1 - Low normal to mildly depressed left ventricular systolic function (EF 50-55%).     2 - Wall motion abnormalities.     3 - Normal left ventricular diastolic function.     4 - Normal right ventricular systolic function .     5 - Trivial aortic regurgitation.     6 - Trivial to mild mitral regurgitation.     7 - Trivial tricuspid regurgitation.     8 - Trivial pulmonic regurgitation.     9 - The estimated PA systolic pressure is 31 mmHg.     Chest CT 5/14/20:  Interval resolved multi lobe pneumonia/pneumonitis  Persistent spiculated left lower lobe mass and spiculated right lower lobe pulmonary nodule.  Bronchogenic carcinoma, perhaps multifocal or multicentric or metastatic disease is suspected.  Differential diagnosis includes areas of organizing pneumonia, round pneumonia, etc but these are considered less likely.  Further evaluation with PET-CT and or tissue diagnosis is recommended.    PFTS 5/14/20:  FEV1: 0.99L  44% --> post bronchodilator 53%  DLCO: 17.82  84.5%    Pathology 5/28/20:  Fragments of pulmonary parenchyma (submitted as right lung biopsy):  -Alveolar septal thickening and " fibrosis with a lymphocytic interstitial infiltrate and macrophage aggregates  -No malignancy is identified on routine or immunohistochemical stains  -The findings observed appear reactive. The possibility of an inflammatory process, including  granulomatous, cannot be excluded. Correlation with clinical and radiographic findings is necessary. If  clinically indicated, consider submission of additional specimens.    Chest CT 9/16/20:   Lobular spiculated right lower lobe lung mass has increased in size when compared to prior exam.  While this could represent an infectious or inflammatory process, the interval increase in size is concerning.  Correlate with prior biopsy results.  Neoplastic process is still not excluded.  Consider PET-CT and/or repeat biopsy if warranted.  Subcentimeter left lower lobe nodular opacity has been stable when compared to multiple prior exams dating back to 2016 favoring a benign etiology.    PET 9/29/20:  1. Hypermetabolic right lower lobe mass concerning for malignancy with additional hypermetabolic focus in the right hilum consistent with metastatic disease.  No evidence of distant metastatic disease.  2. Additional findings as above.    ASSESSMENT/PLAN:     Patient is a 65 y.o. female former smoker with AICD for NICM (recovered EF), breast cancer 2016 s/p R lumpectomy, COPD, DM, HLD, HTN, and RLL lung mass and right hilar avidity concerning for NSCLC. Previous biopsy non-diagnostic.        PET and CT concerning for T2N1 NSCLC. Refer to Dr. Weaver for ENB and EBUS-FNA for diagnosis and staging (procedure scheduled for 10/13/2020). CT head with and without contrast (BIVAICD - no MRI) to complete staging. Needs DSE and repeat PFTs (functional incongruent with prior PFTs). If mediastinum negative and pending above plan for Right Robot-Assisted Thoracoscopic Lower Lobectomy with MLND, possible lower bilobectomy (given PET-avid hilar node at bifurcation or RML and RLL bronchi), possible  thoracotomy on 10/23/20.   Appropriate patient education regarding the lana-operative period as well as intraoperative details were discussed.  Long discussion with patient about the indication and rationale for lung resection, the details of the operation, as well as its risks, benefits and potential outcomes.  We also discussed the alternative treatments, including definitive chemoradiation, and the risks of no treatment.  She was given the opportunity to ask questions and I answered all of her questions to her satisfaction.  She demonstrated understanding and appreciation of above info.  She has decided to proceed with Right Robot-Assisted Thoracoscopy with Lower Lobectomy and Mediastinal Lymphadenectomy, Possible Lower Bilobectomy, Possible Right Thoracotomy on 10/23/2020.

## 2020-09-30 NOTE — PROGRESS NOTES
History & Physical    SUBJECTIVE:     History of Present Illness:  Patient is a 65 y.o. female former smoker with BiVAICD for NICM (recovered EF), LBBB breast cancer 2016, COPD, DM, HLD, HTN, and RLL lung mass. Presented with SOB in March. COVID negative however noted RLL mass. Treated with ABX. Repeat May 2020 with persistent mass. CT guided biopsy non diagnostic of malignancy. Repeat CT in 2020 with interval enlargement. PET with SUV 22 of RLL mass and right hilar SUV 9. No prior chest operations. Non on blood thinners.     Former smoker. 20 pack years. Quit 2016.  PSH: lumpectomy, , cholecystectomy, tubal ligation    Chief Complaint   Patient presents with    Consult       Review of patient's allergies indicates:   Allergen Reactions    Adhesive Rash     tegaderm burns and blistered skin       Current Outpatient Medications   Medication Sig Dispense Refill    albuterol sulfate (PROAIR RESPICLICK) 90 mcg/actuation AePB Inhale 2 puffs into the lungs every 4 (four) hours as needed. Rescue 1 each 5    albuterol-ipratropium (DUO-NEB) 2.5 mg-0.5 mg/3 mL nebulizer solution Take 3 mLs by nebulization every 6 (six) hours while awake. Rescue 30 mL 5    atorvastatin (LIPITOR) 10 MG tablet Take 1 tablet (10 mg total) by mouth once daily. 90 tablet 3    buPROPion (WELLBUTRIN XL) 150 MG TB24 tablet TAKE 1 TABLET BY MOUTH EVERY DAY 30 tablet 0    carvediloL (COREG) 25 MG tablet TAKE 1 TABLET(25 MG) BY MOUTH TWICE DAILY 180 tablet 3    cetirizine (ZYRTEC) 10 MG tablet Take 10 mg by mouth every evening.       fluticasone-salmeterol diskus inhaler 250-50 mcg INHALE 1 PUFF INTO THE LUNGS 2 (TWO) TIMES DAILY. 60 each 5    furosemide (LASIX) 40 MG tablet Take 1 tablet (40 mg total) by mouth once daily. TAKE 1 TABLET(40 MG) BY MOUTH TWICE DAILY 90 tablet 1    metFORMIN (GLUCOPHAGE) 500 MG tablet Take 1 tablet (500 mg total) by mouth 2 (two) times daily. 180 tablet 1    multivitamin (ONE DAILY MULTIVITAMIN)  per tablet Take 1 tablet by mouth once daily.      sertraline (ZOLOFT) 100 MG tablet Take 1 tablet (100 mg total) by mouth every evening. 90 tablet 1    TRUETEST TEST STRIPS Strp test once EVERY MORNING 100 strip 1    lisinopril 10 MG tablet Take 1 tablet (10 mg total) by mouth once daily. (Patient taking differently: Take 10 mg by mouth as needed. ) 90 tablet 3    tiotropium (SPIRIVA WITH HANDIHALER) 18 mcg inhalation capsule Inhale 1 capsule (18 mcg total) into the lungs once daily. 30 capsule 3     No current facility-administered medications for this visit.        Past Medical History:   Diagnosis Date    AICD (automatic cardioverter/defibrillator) present     Asthma     bronchitis in past    Breast cancer 2016    right    Cardiac pacemaker     Cardiomyopathy     COPD (chronic obstructive pulmonary disease)     Diabetes mellitus, type 2     Hyperglycemia     Hyperlipidemia     Hypertension     Malignant neoplasm of overlapping sites of female breast 2016    Nuclear sclerosis of both eyes 2020    Respiratory distress 3/12/2020     Past Surgical History:   Procedure Laterality Date    BREAST BIOPSY Right 2016    [facility]    BREAST LUMPECTOMY Right     CARDIAC CATHETERIZATION Bilateral 2017    CARDIAC DEFIBRILLATOR PLACEMENT Left 08/10/2017    CARDIAC DEFIBRILLATOR PLACEMENT Left 2018     SECTION      x2    CHOLECYSTECTOMY      LUNG BIOPSY N/A 2020    Procedure: BIOPSY, LUNG;  Surgeon: [facility] Diagnostic Provider;  Location: [facility] OR;  Service: Radiology;  Laterality: N/A;  8AM START  RN PREOP 2020---COVID NEGATIVE    REVISION OF IMPLANTABLE CARDIOVERTER-DEFIBRILLATOR (ICD) ELECTRODE LEAD PLACEMENT N/A 6/15/2018    Procedure: REVISION-LEAD-ICD;  Surgeon: Javy Gates MD;  Location: [facility] CATH LAB;  Service: Cardiology;  Laterality: N/A;  LBBB, Bi-V ICD HIS Ld rev, MDT, Choice, MB, 3 Prep    TUBAL LIGATION       Family History   Problem Relation Age  of Onset    Kidney disease Mother     Cataracts Mother     Kidney disease Father     Cataracts Father     Clotting disorder Brother     No Known Problems Daughter     No Known Problems Son     No Known Problems Sister     No Known Problems Maternal Aunt     No Known Problems Maternal Uncle     No Known Problems Paternal Aunt     No Known Problems Paternal Uncle     Macular degeneration Maternal Grandmother     No Known Problems Maternal Grandfather     No Known Problems Paternal Grandmother     No Known Problems Paternal Grandfather     Breast cancer Neg Hx     Ovarian cancer Neg Hx     Amblyopia Neg Hx     Blindness Neg Hx     Cancer Neg Hx     Diabetes Neg Hx     Glaucoma Neg Hx     Hypertension Neg Hx     Retinal detachment Neg Hx     Strabismus Neg Hx     Stroke Neg Hx     Thyroid disease Neg Hx      Social History     Tobacco Use    Smoking status: Former Smoker     Packs/day: 0.50     Years: 40.00     Pack years: 20.00     Types: Cigarettes     Quit date: 2016     Years since quittin.1    Smokeless tobacco: Never Used   Substance Use Topics    Alcohol use: Yes     Frequency: Monthly or less     Drinks per session: 1 or 2     Binge frequency: Never     Comment: rare- holiday    Drug use: No        Review of Systems:  Review of Systems   Constitutional: Negative for activity change, appetite change, fever and unexpected weight change.   Respiratory: Positive for shortness of breath (with exterion). Negative for cough and wheezing.    Cardiovascular: Negative for chest pain, palpitations and leg swelling.   Gastrointestinal: Negative for abdominal pain.   Genitourinary: Negative for difficulty urinating.   Musculoskeletal: Negative for arthralgias.   Skin: Negative for color change.   Neurological: Negative for dizziness and syncope.   Psychiatric/Behavioral: Negative for agitation. The patient is not nervous/anxious.        OBJECTIVE:     Vital Signs (Most Recent)  Vitals:  "   10/01/20 0839   BP: (!) 141/75   Pulse: 86   SpO2: (!) 94%   Weight: 88.9 kg (195 lb 15.8 oz)   Height: 5' 2" (1.575 m)   PainSc: 0-No pain       Physical Exam:  Physical Exam  Constitutional:       Appearance: Normal appearance.   HENT:      Head: Atraumatic.   Cardiovascular:      Rate and Rhythm: Normal rate and regular rhythm.      Pulses: Normal pulses.      Heart sounds: Normal heart sounds. No murmur.   Pulmonary:      Effort: Pulmonary effort is normal.      Breath sounds: Normal breath sounds.   Abdominal:      Palpations: Abdomen is soft.   Skin:     General: Skin is warm and dry.   Neurological:      General: No focal deficit present.      Mental Status: She is alert and oriented to person, place, and time.   Psychiatric:         Mood and Affect: Mood normal.         Behavior: Behavior normal.         Echo 2018:    1 - Low normal to mildly depressed left ventricular systolic function (EF 50-55%).     2 - Wall motion abnormalities.     3 - Normal left ventricular diastolic function.     4 - Normal right ventricular systolic function .     5 - Trivial aortic regurgitation.     6 - Trivial to mild mitral regurgitation.     7 - Trivial tricuspid regurgitation.     8 - Trivial pulmonic regurgitation.     9 - The estimated PA systolic pressure is 31 mmHg.     Chest CT 5/14/20:  Interval resolved multi lobe pneumonia/pneumonitis  Persistent spiculated left lower lobe mass and spiculated right lower lobe pulmonary nodule.  Bronchogenic carcinoma, perhaps multifocal or multicentric or metastatic disease is suspected.  Differential diagnosis includes areas of organizing pneumonia, round pneumonia, etc but these are considered less likely.  Further evaluation with PET-CT and or tissue diagnosis is recommended.    PFTS 5/14/20:  FEV1: 0.99L  44% --> post bronchodilator 53%  DLCO: 17.82  84.5%    Pathology 5/28/20:  Fragments of pulmonary parenchyma (submitted as right lung biopsy):  -Alveolar septal thickening and " fibrosis with a lymphocytic interstitial infiltrate and macrophage aggregates  -No malignancy is identified on routine or immunohistochemical stains  -The findings observed appear reactive. The possibility of an inflammatory process, including  granulomatous, cannot be excluded. Correlation with clinical and radiographic findings is necessary. If  clinically indicated, consider submission of additional specimens.    Chest CT 9/16/20:   Lobular spiculated right lower lobe lung mass has increased in size when compared to prior exam.  While this could represent an infectious or inflammatory process, the interval increase in size is concerning.  Correlate with prior biopsy results.  Neoplastic process is still not excluded.  Consider PET-CT and/or repeat biopsy if warranted.  Subcentimeter left lower lobe nodular opacity has been stable when compared to multiple prior exams dating back to 2016 favoring a benign etiology.    PET 9/29/20:  1. Hypermetabolic right lower lobe mass concerning for malignancy with additional hypermetabolic focus in the right hilum consistent with metastatic disease.  No evidence of distant metastatic disease.  2. Additional findings as above.    ASSESSMENT/PLAN:     Patient is a 65 y.o. female former smoker with AICD for NICM (recovered EF), breast cancer 2016 s/p R lumpectomy, COPD, DM, HLD, HTN, and RLL lung mass and right hilar avidity concerning for NSCLC. Previous biopsy non-diagnostic.        PET and CT concerning for T2N1 NSCLC. Refer to Dr. Weaver for ENB and EBUS-FNA for diagnosis and staging (procedure scheduled for 10/13/2020). CT head with and without contrast (BIVAICD - no MRI) to complete staging. Needs DSE and repeat PFTs (functional incongruent with prior PFTs). If mediastinum negative and pending above plan for Right Robot-Assisted Thoracoscopic Lower Lobectomy with MLND, possible lower bilobectomy (given PET-avid hilar node at bifurcation or RML and RLL bronchi), possible  thoracotomy on 10/23/20.   Appropriate patient education regarding the lana-operative period as well as intraoperative details were discussed.  Long discussion with patient about the indication and rationale for lung resection, the details of the operation, as well as its risks, benefits and potential outcomes.  We also discussed the alternative treatments, including definitive chemoradiation, and the risks of no treatment.  She was given the opportunity to ask questions and I answered all of her questions to her satisfaction.  She demonstrated understanding and appreciation of above info.  She has decided to proceed with Right Robot-Assisted Thoracoscopy with Lower Lobectomy and Mediastinal Lymphadenectomy, Possible Lower Bilobectomy, Possible Right Thoracotomy on 10/23/2020.

## 2020-10-01 ENCOUNTER — TELEPHONE (OUTPATIENT)
Dept: PULMONOLOGY | Facility: CLINIC | Age: 65
End: 2020-10-01

## 2020-10-01 ENCOUNTER — PATIENT MESSAGE (OUTPATIENT)
Dept: CARDIOTHORACIC SURGERY | Facility: CLINIC | Age: 65
End: 2020-10-01

## 2020-10-01 ENCOUNTER — OFFICE VISIT (OUTPATIENT)
Dept: CARDIOTHORACIC SURGERY | Facility: CLINIC | Age: 65
End: 2020-10-01
Payer: MEDICARE

## 2020-10-01 ENCOUNTER — CLINICAL SUPPORT (OUTPATIENT)
Dept: CARDIOLOGY | Facility: HOSPITAL | Age: 65
End: 2020-10-01
Attending: NURSE PRACTITIONER
Payer: MEDICARE

## 2020-10-01 ENCOUNTER — CLINICAL SUPPORT (OUTPATIENT)
Dept: CARDIOLOGY | Facility: HOSPITAL | Age: 65
End: 2020-10-01
Payer: MEDICARE

## 2020-10-01 ENCOUNTER — OFFICE VISIT (OUTPATIENT)
Dept: ELECTROPHYSIOLOGY | Facility: CLINIC | Age: 65
End: 2020-10-01
Payer: MEDICARE

## 2020-10-01 ENCOUNTER — HOSPITAL ENCOUNTER (OUTPATIENT)
Dept: CARDIOLOGY | Facility: CLINIC | Age: 65
Discharge: HOME | End: 2020-10-01
Payer: MEDICARE

## 2020-10-01 VITALS
WEIGHT: 194 LBS | DIASTOLIC BLOOD PRESSURE: 78 MMHG | BODY MASS INDEX: 35.7 KG/M2 | HEIGHT: 62 IN | HEART RATE: 76 BPM | SYSTOLIC BLOOD PRESSURE: 120 MMHG

## 2020-10-01 VITALS
WEIGHT: 196 LBS | OXYGEN SATURATION: 94 % | SYSTOLIC BLOOD PRESSURE: 141 MMHG | DIASTOLIC BLOOD PRESSURE: 75 MMHG | BODY MASS INDEX: 36.07 KG/M2 | HEART RATE: 86 BPM | HEIGHT: 62 IN

## 2020-10-01 DIAGNOSIS — I42.8 NICM (NONISCHEMIC CARDIOMYOPATHY): ICD-10-CM

## 2020-10-01 DIAGNOSIS — I44.7 LBBB (LEFT BUNDLE BRANCH BLOCK): ICD-10-CM

## 2020-10-01 DIAGNOSIS — I42.0 DILATED CARDIOMYOPATHY: ICD-10-CM

## 2020-10-01 DIAGNOSIS — Z01.818 PRE-OP EVALUATION: ICD-10-CM

## 2020-10-01 DIAGNOSIS — Z95.810 CARDIAC RESYNCHRONIZATION THERAPY DEFIBRILLATOR (CRT-D) IN PLACE: Primary | ICD-10-CM

## 2020-10-01 DIAGNOSIS — C34.91 NSCLC OF RIGHT LUNG: Primary | ICD-10-CM

## 2020-10-01 DIAGNOSIS — R91.8 LUNG MASS: Primary | ICD-10-CM

## 2020-10-01 DIAGNOSIS — Z95.810 PRESENCE OF AUTOMATIC (IMPLANTABLE) CARDIAC DEFIBRILLATOR: ICD-10-CM

## 2020-10-01 DIAGNOSIS — R06.09 DOE (DYSPNEA ON EXERTION): ICD-10-CM

## 2020-10-01 DIAGNOSIS — R91.8 LUNG MASS: ICD-10-CM

## 2020-10-01 DIAGNOSIS — I44.7 LEFT BUNDLE-BRANCH BLOCK: ICD-10-CM

## 2020-10-01 DIAGNOSIS — I10 ESSENTIAL HYPERTENSION: ICD-10-CM

## 2020-10-01 PROCEDURE — 3074F PR MOST RECENT SYSTOLIC BLOOD PRESSURE < 130 MM HG: ICD-10-PCS | Mod: HCNC,CPTII,S$GLB, | Performed by: NURSE PRACTITIONER

## 2020-10-01 PROCEDURE — 93010 RHYTHM STRIP: ICD-10-PCS | Mod: HCNC,S$GLB,, | Performed by: INTERNAL MEDICINE

## 2020-10-01 PROCEDURE — 3008F PR BODY MASS INDEX (BMI) DOCUMENTED: ICD-10-PCS | Mod: HCNC,CPTII,S$GLB, | Performed by: NURSE PRACTITIONER

## 2020-10-01 PROCEDURE — 99999 PR PBB SHADOW E&M-EST. PATIENT-LVL V: CPT | Mod: PBBFAC,HCNC,, | Performed by: THORACIC SURGERY (CARDIOTHORACIC VASCULAR SURGERY)

## 2020-10-01 PROCEDURE — 3008F BODY MASS INDEX DOCD: CPT | Mod: HCNC,CPTII,S$GLB, | Performed by: THORACIC SURGERY (CARDIOTHORACIC VASCULAR SURGERY)

## 2020-10-01 PROCEDURE — 99999 PR PBB SHADOW E&M-EST. PATIENT-LVL IV: ICD-10-PCS | Mod: PBBFAC,HCNC,, | Performed by: NURSE PRACTITIONER

## 2020-10-01 PROCEDURE — 3078F PR MOST RECENT DIASTOLIC BLOOD PRESSURE < 80 MM HG: ICD-10-PCS | Mod: HCNC,CPTII,S$GLB, | Performed by: THORACIC SURGERY (CARDIOTHORACIC VASCULAR SURGERY)

## 2020-10-01 PROCEDURE — 93296 REM INTERROG EVL PM/IDS: CPT | Mod: HCNC | Performed by: INTERNAL MEDICINE

## 2020-10-01 PROCEDURE — 99205 OFFICE O/P NEW HI 60 MIN: CPT | Mod: HCNC,S$GLB,, | Performed by: THORACIC SURGERY (CARDIOTHORACIC VASCULAR SURGERY)

## 2020-10-01 PROCEDURE — 93005 ELECTROCARDIOGRAM TRACING: CPT

## 2020-10-01 PROCEDURE — 99214 OFFICE O/P EST MOD 30 MIN: CPT | Mod: HCNC,S$GLB,, | Performed by: NURSE PRACTITIONER

## 2020-10-01 PROCEDURE — 99205 PR OFFICE/OUTPT VISIT, NEW, LEVL V, 60-74 MIN: ICD-10-PCS | Mod: HCNC,S$GLB,, | Performed by: THORACIC SURGERY (CARDIOTHORACIC VASCULAR SURGERY)

## 2020-10-01 PROCEDURE — 3074F SYST BP LT 130 MM HG: CPT | Mod: HCNC,CPTII,S$GLB, | Performed by: NURSE PRACTITIONER

## 2020-10-01 PROCEDURE — 3078F DIAST BP <80 MM HG: CPT | Mod: HCNC,CPTII,S$GLB, | Performed by: NURSE PRACTITIONER

## 2020-10-01 PROCEDURE — 99214 PR OFFICE/OUTPT VISIT, EST, LEVL IV, 30-39 MIN: ICD-10-PCS | Mod: HCNC,S$GLB,, | Performed by: NURSE PRACTITIONER

## 2020-10-01 PROCEDURE — 93284 PRGRMG EVAL IMPLANTABLE DFB: CPT | Mod: 26,HCNC,, | Performed by: NURSE PRACTITIONER

## 2020-10-01 PROCEDURE — 3078F DIAST BP <80 MM HG: CPT | Mod: HCNC,CPTII,S$GLB, | Performed by: THORACIC SURGERY (CARDIOTHORACIC VASCULAR SURGERY)

## 2020-10-01 PROCEDURE — 3074F SYST BP LT 130 MM HG: CPT | Mod: HCNC,CPTII,S$GLB, | Performed by: THORACIC SURGERY (CARDIOTHORACIC VASCULAR SURGERY)

## 2020-10-01 PROCEDURE — 93010 ELECTROCARDIOGRAM REPORT: CPT | Mod: HCNC,S$GLB,, | Performed by: INTERNAL MEDICINE

## 2020-10-01 PROCEDURE — 3078F PR MOST RECENT DIASTOLIC BLOOD PRESSURE < 80 MM HG: ICD-10-PCS | Mod: HCNC,CPTII,S$GLB, | Performed by: NURSE PRACTITIONER

## 2020-10-01 PROCEDURE — 93284 CARDIAC DEVICE CHECK - IN CLINIC & HOSPITAL: ICD-10-PCS | Mod: 26,HCNC,, | Performed by: NURSE PRACTITIONER

## 2020-10-01 PROCEDURE — 93295 DEV INTERROG REMOTE 1/2/MLT: CPT | Mod: ,,, | Performed by: INTERNAL MEDICINE

## 2020-10-01 PROCEDURE — 93284 PRGRMG EVAL IMPLANTABLE DFB: CPT | Mod: HCNC

## 2020-10-01 PROCEDURE — 99999 PR PBB SHADOW E&M-EST. PATIENT-LVL V: ICD-10-PCS | Mod: PBBFAC,HCNC,, | Performed by: THORACIC SURGERY (CARDIOTHORACIC VASCULAR SURGERY)

## 2020-10-01 PROCEDURE — 99999 PR PBB SHADOW E&M-EST. PATIENT-LVL IV: CPT | Mod: PBBFAC,HCNC,, | Performed by: NURSE PRACTITIONER

## 2020-10-01 PROCEDURE — 3008F BODY MASS INDEX DOCD: CPT | Mod: HCNC,CPTII,S$GLB, | Performed by: NURSE PRACTITIONER

## 2020-10-01 PROCEDURE — 3074F PR MOST RECENT SYSTOLIC BLOOD PRESSURE < 130 MM HG: ICD-10-PCS | Mod: HCNC,CPTII,S$GLB, | Performed by: THORACIC SURGERY (CARDIOTHORACIC VASCULAR SURGERY)

## 2020-10-01 PROCEDURE — 3008F PR BODY MASS INDEX (BMI) DOCUMENTED: ICD-10-PCS | Mod: HCNC,CPTII,S$GLB, | Performed by: THORACIC SURGERY (CARDIOTHORACIC VASCULAR SURGERY)

## 2020-10-01 PROCEDURE — 93295 CARDIAC DEVICE CHECK - REMOTE: ICD-10-PCS | Mod: ,,, | Performed by: INTERNAL MEDICINE

## 2020-10-01 NOTE — PROGRESS NOTES
Ms. Montoya is a patient of Dr. Gates and was last seen in clinic 10/1/2019.      Subjective:   Patient ID:  Hannah Montoya is a 65 y.o. female who presents for follow-up of Cardiomyopathy  .     HPI:    Ms. Montoya is a 65 y.o. female with NICM, LBBB, CRT-D, HTN, HLD, DM, hx of rt breast CA here for annual follow up.    Background:    63 yoF NICM, LBBB s/p CRT-D here for second opinion regarding transvenous LV lead placement. She has an attempt at an LV lead 8/10/17 by Dr Lopez. The attempt was complicated by tortuous venous anatomy and inability to pass an LV lead. Venograms show mid and anterolateral branches with tortuous courses. The mid lateral branch was accessed with a 014 wire but the lead could not be advanced. She had an epicardial lead implanted however she has elevated thresholds, 5.0 V @ 1.2 ms. She has expected 1-1.5 y on her battery life giving her overall ~2 years with her current device. She has mild improvement in symptoms. QRS went from 168 to 150 ms with CRT pacing. She is here for a second opinion.     9/2018: Attempt at His bundle lead 5/2/18 resulting in dislodgement. Re-attempt 6/15/18 with successful lead placement. Marked improvement in symptoms. ECG with QRS 84 ms. Normal CRT-D function (His lead in LV port). Echo showed EF of 50-55%.    10/2019: 100% ventricular pacing - LV in His.  Patient feels well with no cardiac complaints.     Update (10/01/2020):    Today she says she has been feeling well, although she is stressed because she just had a CT surgery consult re: possible lung CA (RLL mass noted to CXR in March. PET 9/29/2020 showing hypermetabolic right lower lobe mass concerning for malignancy with additional hypermetabolic focus in the right hilum consistent with metastatic disease.) She says otherwise she feels fine. No cardiac complaints. Ms. Montoya denies chest pain with exertion or at rest, palpitations, SOB, SINGH, dizziness, or syncope.    Device Interrogation  (10/1/2020) reveals an intrinsic SR with stable lead and device function. No arrhythmias or treated episodes were noted.  She paces 0% in the RA and 100% in the BiV (RV and His). Estimated battery longevity 3.4 years.     I have personally reviewed the patient's EKG today, which shows ASVP at 76bpm. AR interval is 180. QRS is 86. QTc is 452.    Recent Cardiac Tests:    2D Echo (10/16/2018):  CONCLUSIONS     1 - Low normal to mildly depressed left ventricular systolic function (EF 50-55%).     2 - Wall motion abnormalities.     3 - Normal left ventricular diastolic function.     4 - Normal right ventricular systolic function .     5 - Trivial aortic regurgitation.     6 - Trivial to mild mitral regurgitation.     7 - Trivial tricuspid regurgitation.     8 - Trivial pulmonic regurgitation.     9 - The estimated PA systolic pressure is 31 mmHg.     Current Outpatient Medications   Medication Sig    albuterol sulfate (PROAIR RESPICLICK) 90 mcg/actuation AePB Inhale 2 puffs into the lungs every 4 (four) hours as needed. Rescue    albuterol-ipratropium (DUO-NEB) 2.5 mg-0.5 mg/3 mL nebulizer solution Take 3 mLs by nebulization every 6 (six) hours while awake. Rescue    atorvastatin (LIPITOR) 10 MG tablet Take 1 tablet (10 mg total) by mouth once daily.    buPROPion (WELLBUTRIN XL) 150 MG TB24 tablet TAKE 1 TABLET BY MOUTH EVERY DAY    carvediloL (COREG) 25 MG tablet TAKE 1 TABLET(25 MG) BY MOUTH TWICE DAILY    cetirizine (ZYRTEC) 10 MG tablet Take 10 mg by mouth every evening.     fluticasone-salmeterol diskus inhaler 250-50 mcg INHALE 1 PUFF INTO THE LUNGS 2 (TWO) TIMES DAILY.    furosemide (LASIX) 40 MG tablet Take 1 tablet (40 mg total) by mouth once daily. TAKE 1 TABLET(40 MG) BY MOUTH TWICE DAILY    lisinopril 10 MG tablet Take 1 tablet (10 mg total) by mouth once daily. (Patient taking differently: Take 10 mg by mouth as needed. )    metFORMIN (GLUCOPHAGE) 500 MG tablet Take 1 tablet (500 mg total) by mouth  "2 (two) times daily.    multivitamin (ONE DAILY MULTIVITAMIN) per tablet Take 1 tablet by mouth once daily.    sertraline (ZOLOFT) 100 MG tablet Take 1 tablet (100 mg total) by mouth every evening.    tiotropium (SPIRIVA WITH HANDIHALER) 18 mcg inhalation capsule Inhale 1 capsule (18 mcg total) into the lungs once daily.    TRUETEST TEST STRIPS Strp test once EVERY MORNING     No current facility-administered medications for this visit.        Review of Systems   Constitution: Negative for malaise/fatigue.   Cardiovascular: Negative for chest pain, dyspnea on exertion, irregular heartbeat, leg swelling and palpitations.   Respiratory: Negative for shortness of breath.    Hematologic/Lymphatic: Negative for bleeding problem.   Skin: Negative for rash.   Musculoskeletal: Negative for myalgias.   Gastrointestinal: Negative for hematemesis, hematochezia and nausea.   Genitourinary: Negative for hematuria.   Neurological: Negative for light-headedness.   Psychiatric/Behavioral: Negative for altered mental status.   Allergic/Immunologic: Negative for persistent infections.     Objective:        /78   Pulse 76   Ht 5' 2" (1.575 m)   Wt 88 kg (194 lb)   LMP  (LMP Unknown)   BMI 35.48 kg/m²     Physical Exam   Constitutional: She is oriented to person, place, and time. She appears well-developed and well-nourished.   HENT:   Head: Normocephalic.   Nose: Nose normal.   Eyes: Pupils are equal, round, and reactive to light.   Cardiovascular: Normal rate, regular rhythm, S1 normal and S2 normal.   No murmur heard.  Pulses:       Radial pulses are 2+ on the right side and 2+ on the left side.   Pulmonary/Chest: Breath sounds normal. No respiratory distress.   Device to LUCW. Incision and pocket in good repair.   Abdominal: Normal appearance.   Musculoskeletal: Normal range of motion.         General: No edema.   Neurological: She is alert and oriented to person, place, and time.   Skin: Skin is warm and dry. No " erythema.   Psychiatric: She has a normal mood and affect. Her speech is normal and behavior is normal.   Nursing note and vitals reviewed.    Lab Results   Component Value Date     08/03/2020    K 4.1 08/03/2020    MG 1.7 05/03/2017    BUN 28 (H) 08/03/2020    CREATININE 1.1 08/03/2020    ALT 34 08/03/2020    AST 25 08/03/2020    HGB 12.9 05/26/2020    HCT 40.2 05/26/2020    LDLCALC 41.8 (L) 08/03/2020       Recent Labs   Lab 04/18/18  1455 06/07/18  0950 05/26/20  1540   INR 0.9 0.9 0.9       Assessment:     1. Cardiac resynchronization therapy defibrillator (CRT-D) in place    2. NICM (nonischemic cardiomyopathy)    3. Left bundle-branch block    4. Essential hypertension    5. Dilated cardiomyopathy      Plan:     In summary, Ms. Montoya is a 65 y.o. female with NICM, LBBB, CRT-D, HTN, HLD, DM, hx of rt breast CA here for annual follow up.  Ms. Montoya is doing well from a device perspective with stable lead and device function. No arrhythmia noted. 100% His pacing with narrow QRS. EF 50-55%. No CHF symptoms. Unfortunately she is being evaluated for possible lung CA after concerning PET yesterday. She is scheduled for biopsy later this month. Continue to monitor device.     Continue current medication regimen and device settings.   Follow up in device clinic as scheduled.   Follow up in EP clinic in 1 year, sooner as needed.     *A copy of this note has been sent to Dr. Gates*    Follow up in about 1 year (around 10/1/2021).    ------------------------------------------------------------------    REGINALD Wade, NP-C  Cardiac Electrophysiology

## 2020-10-01 NOTE — LETTER
October 5, 2020      Lang Cannon MD  [address] [facility] [address]  [address]  [place] [address]           [facility]  [address]  [facility] [address]  Phone: [phone]  Fax: [phone]          Patient: Hannah Montoya   MR Number: [id number]   YOB: 1955   Date of Visit: 10/1/2020       Dear Dr. Lang Cannon:    Thank you for referring Hannah Montoya to me for evaluation. Attached you will find relevant portions of my assessment and plan of care.    If you have questions, please do not hesitate to call me. I look forward to following Hannah Montoya along with you.    Sincerely,    Ramo Tucker MD    Enclosure  CC:  No Recipients    If you would like to receive this communication electronically, please contact [email or web address] or [phone] to request more information on [employer] Link access.    For providers and/or their staff who would like to refer a patient to [facility], please contact us through our one-stop-shop provider referral line, [facility], at [phone].    If you feel you have received this communication in error or would no longer like to receive these types of communications, please e-mail [email or web address]

## 2020-10-05 ENCOUNTER — OFFICE VISIT (OUTPATIENT)
Dept: PULMONOLOGY | Facility: CLINIC | Age: 65
End: 2020-10-05
Payer: MEDICARE

## 2020-10-05 ENCOUNTER — PATIENT MESSAGE (OUTPATIENT)
Dept: ADMINISTRATIVE | Facility: HOSPITAL | Age: 65
End: 2020-10-05

## 2020-10-05 ENCOUNTER — PATIENT MESSAGE (OUTPATIENT)
Dept: FAMILY MEDICINE | Facility: CLINIC | Age: 65
End: 2020-10-05

## 2020-10-05 VITALS — WEIGHT: 196.44 LBS | OXYGEN SATURATION: 92 % | BODY MASS INDEX: 36.15 KG/M2 | HEIGHT: 62 IN | HEART RATE: 77 BPM

## 2020-10-05 DIAGNOSIS — Z12.11 COLON CANCER SCREENING: Primary | ICD-10-CM

## 2020-10-05 DIAGNOSIS — J41.0 SIMPLE CHRONIC BRONCHITIS: ICD-10-CM

## 2020-10-05 DIAGNOSIS — R91.8 LUNG MASS: Primary | ICD-10-CM

## 2020-10-05 PROCEDURE — 99999 PR PBB SHADOW E&M-EST. PATIENT-LVL IV: CPT | Mod: PBBFAC,HCNC,, | Performed by: INTERNAL MEDICINE

## 2020-10-05 PROCEDURE — 99214 PR OFFICE/OUTPT VISIT, EST, LEVL IV, 30-39 MIN: ICD-10-PCS | Mod: HCNC,S$GLB,, | Performed by: INTERNAL MEDICINE

## 2020-10-05 PROCEDURE — 99999 PR PBB SHADOW E&M-EST. PATIENT-LVL IV: ICD-10-PCS | Mod: PBBFAC,HCNC,, | Performed by: INTERNAL MEDICINE

## 2020-10-05 PROCEDURE — 99214 OFFICE O/P EST MOD 30 MIN: CPT | Mod: HCNC,S$GLB,, | Performed by: INTERNAL MEDICINE

## 2020-10-05 PROCEDURE — 3008F BODY MASS INDEX DOCD: CPT | Mod: HCNC,CPTII,S$GLB, | Performed by: INTERNAL MEDICINE

## 2020-10-05 PROCEDURE — 3008F PR BODY MASS INDEX (BMI) DOCUMENTED: ICD-10-PCS | Mod: HCNC,CPTII,S$GLB, | Performed by: INTERNAL MEDICINE

## 2020-10-05 NOTE — LETTER
October 5, 2020      QUINCY Rogers  [address] [facility] [address]           [facility]  [address]  [place] [address]  Phone: [phone]          Patient: Hannah Montoya   MR Number: [id number]   YOB: 1955   Date of Visit: 10/5/2020       Dear Marilyn Baum:    Thank you for referring Hannah Montoya to me for evaluation. Attached you will find relevant portions of my assessment and plan of care.    If you have questions, please do not hesitate to call me. I look forward to following Hannah Montoya along with you.    Sincerely,    Jamilah Weaver MD    Enclosure  CC:  No Recipients    If you would like to receive this communication electronically, please contact [email or web address] or [phone] to request more information on [employer] Link access.    For providers and/or their staff who would like to refer a patient to [facility], please contact us through our one-stop-shop provider referral line, [facility], at [phone].    If you feel you have received this communication in error or would no longer like to receive these types of communications, please e-mail [email or web address]

## 2020-10-05 NOTE — H&P (VIEW-ONLY)
"Subjective:       Patient ID: Hannah Montoya is a 65 y.o. female.    Chief Complaint: Abnormal Ct Scan    65 year old with a history of emphysema (intubated x 5 days in 2016 for respiratory failure), former smoker with pacemaker who is here for bronchoscopy with EBUS evaluation.  Patient with a history of a percutaneous bx of a RLL mass that was negative for malignancy.  PET imaging with hilar adenopathy and and need for mediastinal staging and dx.  Patient on spiriva and advair for control and never requires rescue inhalation.  DM.  Previously seen by Dr. Cannon.  She is new to me.      Review of Systems   Constitutional: Negative for weight loss and weight gain.   HENT: Negative for trouble swallowing.    Cardiovascular: Negative for chest pain and leg swelling.   Gastrointestinal: Negative for acid reflux.       Objective:       Vitals:    10/05/20 0803   Pulse: 77   SpO2: (!) 92%   Weight: 89.1 kg (196 lb 6.9 oz)   Height: 5' 2" (1.575 m)     Physical Exam   Constitutional: She is oriented to person, place, and time. She appears well-developed and well-nourished.   HENT:   Head: Normocephalic.   Cardiovascular: Normal rate and regular rhythm.   Pulmonary/Chest: Normal expansion and hyperinflation.   Musculoskeletal: Normal range of motion.         General: No edema.   Lymphadenopathy: No supraclavicular adenopathy is present.     She has no cervical adenopathy.   Neurological: She is alert and oriented to person, place, and time.   Psychiatric: She has a normal mood and affect.        Personal Diagnostic Review  Pulmonary function tests: Fev1 1.21 without BD response.  Normal diffusion and lung volumes    PET/CT with right hilar lymphadenopathy and right lower lobe mass highly concerning for malignancy.  No flowsheet data found.      Assessment:       1. Lung mass    2. Simple chronic bronchitis        Outpatient Encounter Medications as of 10/5/2020   Medication Sig Dispense Refill    albuterol sulfate " (PROAIR RESPICLICK) 90 mcg/actuation AePB Inhale 2 puffs into the lungs every 4 (four) hours as needed. Rescue 1 each 5    albuterol-ipratropium (DUO-NEB) 2.5 mg-0.5 mg/3 mL nebulizer solution Take 3 mLs by nebulization every 6 (six) hours while awake. Rescue 30 mL 5    atorvastatin (LIPITOR) 10 MG tablet Take 1 tablet (10 mg total) by mouth once daily. 90 tablet 3    buPROPion (WELLBUTRIN XL) 150 MG TB24 tablet TAKE 1 TABLET BY MOUTH EVERY DAY 30 tablet 0    carvediloL (COREG) 25 MG tablet TAKE 1 TABLET(25 MG) BY MOUTH TWICE DAILY 180 tablet 3    cetirizine (ZYRTEC) 10 MG tablet Take 10 mg by mouth every evening.       fluticasone-salmeterol diskus inhaler 250-50 mcg INHALE 1 PUFF INTO THE LUNGS 2 (TWO) TIMES DAILY. (Patient taking differently: INHALE 1 PUFF INTO THE LUNGS 2 (TWO) TIMES DAILY.) 60 each 5    furosemide (LASIX) 40 MG tablet Take 1 tablet (40 mg total) by mouth once daily. TAKE 1 TABLET(40 MG) BY MOUTH TWICE DAILY 90 tablet 1    lisinopril 10 MG tablet Take 1 tablet (10 mg total) by mouth once daily. (Patient not taking: Reported on 10/1/2020) 90 tablet 3    metFORMIN (GLUCOPHAGE) 500 MG tablet Take 1 tablet (500 mg total) by mouth 2 (two) times daily. 180 tablet 1    multivitamin (ONE DAILY MULTIVITAMIN) per tablet Take 1 tablet by mouth once daily.      sertraline (ZOLOFT) 100 MG tablet Take 1 tablet (100 mg total) by mouth every evening. 90 tablet 1    tiotropium (SPIRIVA WITH HANDIHALER) 18 mcg inhalation capsule Inhale 1 capsule (18 mcg total) into the lungs once daily. 30 capsule 3    TRUETEST TEST STRIPS Strp test once EVERY MORNING 100 strip 1     No facility-administered encounter medications on file as of 10/5/2020.      Orders Placed This Encounter   Procedures    COVID-19 Routine Screening     Standing Status:   Future     Standing Expiration Date:   12/4/2021     Order Specific Question:   Is the patient symptomatic?     Answer:   No     Order Specific Question:   Is this  needed for pre-procedure or pre-op testing?     Answer:   Yes     Order Specific Question:   Diagnosis:     Answer:   Preop respiratory exam [[id number]]     Plan:     Problem List Items Addressed This Visit     COPD (chronic obstructive pulmonary disease)    Overview     fev1 58%.  Controlled on advair, spiriva and duonebs.     Quit smoking 2016         Lung mass - Primary    Overview     3.2 rll cm mass and rml atelectasis.   With hilar adenopathy  Plan for bronchoscopy with EBUS and navigation Oct 13 for diagnosis and staging.  High risk for lung [id number].    I have explained the risks, benefits and alternatives of the procedure in detail.  The patient voices understanding and all questions have been answered.  The patient agrees to proceed as planned.         Relevant Orders    COVID-19 Routine Screening

## 2020-10-05 NOTE — PROGRESS NOTES
"Subjective:       Patient ID: Hannah Montoya is a 65 y.o. female.    Chief Complaint: Abnormal Ct Scan    65 year old with a history of emphysema (intubated x 5 days in 2016 for respiratory failure), former smoker with pacemaker who is here for bronchoscopy with EBUS evaluation.  Patient with a history of a percutaneous bx of a RLL mass that was negative for malignancy.  PET imaging with hilar adenopathy and and need for mediastinal staging and dx.  Patient on spiriva and advair for control and never requires rescue inhalation.  DM.  Previously seen by Dr. Cannon.  She is new to me.      Review of Systems   Constitutional: Negative for weight loss and weight gain.   HENT: Negative for trouble swallowing.    Cardiovascular: Negative for chest pain and leg swelling.   Gastrointestinal: Negative for acid reflux.       Objective:       Vitals:    10/05/20 0803   Pulse: 77   SpO2: (!) 92%   Weight: 89.1 kg (196 lb 6.9 oz)   Height: 5' 2" (1.575 m)     Physical Exam   Constitutional: She is oriented to person, place, and time. She appears well-developed and well-nourished.   HENT:   Head: Normocephalic.   Cardiovascular: Normal rate and regular rhythm.   Pulmonary/Chest: Normal expansion and hyperinflation.   Musculoskeletal: Normal range of motion.         General: No edema.   Lymphadenopathy: No supraclavicular adenopathy is present.     She has no cervical adenopathy.   Neurological: She is alert and oriented to person, place, and time.   Psychiatric: She has a normal mood and affect.        Personal Diagnostic Review  Pulmonary function tests: Fev1 1.21 without BD response.  Normal diffusion and lung volumes    PET/CT with right hilar lymphadenopathy and right lower lobe mass highly concerning for malignancy.  No flowsheet data found.      Assessment:       1. Lung mass    2. Simple chronic bronchitis        Outpatient Encounter Medications as of 10/5/2020   Medication Sig Dispense Refill    albuterol sulfate " (PROAIR RESPICLICK) 90 mcg/actuation AePB Inhale 2 puffs into the lungs every 4 (four) hours as needed. Rescue 1 each 5    albuterol-ipratropium (DUO-NEB) 2.5 mg-0.5 mg/3 mL nebulizer solution Take 3 mLs by nebulization every 6 (six) hours while awake. Rescue 30 mL 5    atorvastatin (LIPITOR) 10 MG tablet Take 1 tablet (10 mg total) by mouth once daily. 90 tablet 3    buPROPion (WELLBUTRIN XL) 150 MG TB24 tablet TAKE 1 TABLET BY MOUTH EVERY DAY 30 tablet 0    carvediloL (COREG) 25 MG tablet TAKE 1 TABLET(25 MG) BY MOUTH TWICE DAILY 180 tablet 3    cetirizine (ZYRTEC) 10 MG tablet Take 10 mg by mouth every evening.       fluticasone-salmeterol diskus inhaler 250-50 mcg INHALE 1 PUFF INTO THE LUNGS 2 (TWO) TIMES DAILY. (Patient taking differently: INHALE 1 PUFF INTO THE LUNGS 2 (TWO) TIMES DAILY.) 60 each 5    furosemide (LASIX) 40 MG tablet Take 1 tablet (40 mg total) by mouth once daily. TAKE 1 TABLET(40 MG) BY MOUTH TWICE DAILY 90 tablet 1    lisinopril 10 MG tablet Take 1 tablet (10 mg total) by mouth once daily. (Patient not taking: Reported on 10/1/2020) 90 tablet 3    metFORMIN (GLUCOPHAGE) 500 MG tablet Take 1 tablet (500 mg total) by mouth 2 (two) times daily. 180 tablet 1    multivitamin (ONE DAILY MULTIVITAMIN) per tablet Take 1 tablet by mouth once daily.      sertraline (ZOLOFT) 100 MG tablet Take 1 tablet (100 mg total) by mouth every evening. 90 tablet 1    tiotropium (SPIRIVA WITH HANDIHALER) 18 mcg inhalation capsule Inhale 1 capsule (18 mcg total) into the lungs once daily. 30 capsule 3    TRUETEST TEST STRIPS Strp test once EVERY MORNING 100 strip 1     No facility-administered encounter medications on file as of 10/5/2020.      Orders Placed This Encounter   Procedures    COVID-19 Routine Screening     Standing Status:   Future     Standing Expiration Date:   12/4/2021     Order Specific Question:   Is the patient symptomatic?     Answer:   No     Order Specific Question:   Is this  needed for pre-procedure or pre-op testing?     Answer:   Yes     Order Specific Question:   Diagnosis:     Answer:   Preop respiratory exam [[id number]]     Plan:     Problem List Items Addressed This Visit     COPD (chronic obstructive pulmonary disease)    Overview     fev1 58%.  Controlled on advair, spiriva and duonebs.     Quit smoking 2016         Lung mass - Primary    Overview     3.2 rll cm mass and rml atelectasis.   With hilar adenopathy  Plan for bronchoscopy with EBUS and navigation Oct 13 for diagnosis and staging.  High risk for lung [id number].    I have explained the risks, benefits and alternatives of the procedure in detail.  The patient voices understanding and all questions have been answered.  The patient agrees to proceed as planned.         Relevant Orders    COVID-19 Routine Screening

## 2020-10-06 ENCOUNTER — PATIENT MESSAGE (OUTPATIENT)
Dept: FAMILY MEDICINE | Facility: CLINIC | Age: 65
End: 2020-10-06

## 2020-10-07 ENCOUNTER — HOSPITAL ENCOUNTER (OUTPATIENT)
Dept: RADIOLOGY | Facility: HOSPITAL | Age: 65
Discharge: HOME | End: 2020-10-07
Attending: PHYSICIAN ASSISTANT
Payer: MEDICARE

## 2020-10-07 DIAGNOSIS — C34.91 NSCLC OF RIGHT LUNG: ICD-10-CM

## 2020-10-07 PROCEDURE — 70470 CT HEAD/BRAIN W/O & W/DYE: CPT | Mod: TC,HCNC

## 2020-10-07 PROCEDURE — 70470 CT HEAD/BRAIN W/O & W/DYE: CPT | Mod: 26,HCNC,, | Performed by: RADIOLOGY

## 2020-10-07 PROCEDURE — 70470 CT HEAD WITH AND WITHOUT: ICD-10-PCS | Mod: 26,HCNC,, | Performed by: RADIOLOGY

## 2020-10-07 PROCEDURE — 25500020 PHARM REV CODE 255: Mod: HCNC | Performed by: PHYSICIAN ASSISTANT

## 2020-10-07 RX ADMIN — IOHEXOL 75 ML: 350 INJECTION, SOLUTION INTRAVENOUS at 07:10

## 2020-10-08 DIAGNOSIS — I25.10 ATHEROSCLEROSIS OF NATIVE CORONARY ARTERY OF NATIVE HEART WITHOUT ANGINA PECTORIS: ICD-10-CM

## 2020-10-08 DIAGNOSIS — G93.89 ENCEPHALOMALACIA ON IMAGING STUDY: Primary | ICD-10-CM

## 2020-10-09 ENCOUNTER — PATIENT MESSAGE (OUTPATIENT)
Dept: SURGERY | Facility: HOSPITAL | Age: 65
End: 2020-10-09

## 2020-10-12 ENCOUNTER — LAB VISIT (OUTPATIENT)
Dept: SURGERY | Facility: CLINIC | Age: 65
End: 2020-10-12
Payer: MEDICARE

## 2020-10-12 ENCOUNTER — PATIENT MESSAGE (OUTPATIENT)
Dept: PULMONOLOGY | Facility: CLINIC | Age: 65
End: 2020-10-12

## 2020-10-12 ENCOUNTER — TELEPHONE (OUTPATIENT)
Dept: PULMONOLOGY | Facility: CLINIC | Age: 65
End: 2020-10-12

## 2020-10-12 DIAGNOSIS — R91.8 LUNG MASS: ICD-10-CM

## 2020-10-12 LAB — SARS-COV-2 RNA RESP QL NAA+PROBE: NOT DETECTED

## 2020-10-12 PROCEDURE — U0003 INFECTIOUS AGENT DETECTION BY NUCLEIC ACID (DNA OR RNA); SEVERE ACUTE RESPIRATORY SYNDROME CORONAVIRUS 2 (SARS-COV-2) (CORONAVIRUS DISEASE [COVID-19]), AMPLIFIED PROBE TECHNIQUE, MAKING USE OF HIGH THROUGHPUT TECHNOLOGIES AS DESCRIBED BY CMS-2020-01-R: HCPCS | Mod: HCNC

## 2020-10-12 NOTE — TELEPHONE ENCOUNTER
I spoke to patient to let her know nothing to eat or drink after midnight tonight. Patient scheduled for EBUS tomorrow with Dr Weaver arrival to [facility] for 8:30am. I will send instructions to patient portal. Patient missed placed ebus instructions she left with from office visit on 10/5/20. Patient confirmed and verbalized understanding.

## 2020-10-12 NOTE — TELEPHONE ENCOUNTER
----- Message from Margarette Anita sent at 10/12/2020  2:55 PM CDT -----  Contact: [phone]  Caller says she needs to know if she is to eat or not before she comes to [facility] tomorrow.

## 2020-10-13 ENCOUNTER — ANESTHESIA EVENT (OUTPATIENT)
Dept: SURGERY | Facility: HOSPITAL | Age: 65
End: 2020-10-13
Payer: MEDICARE

## 2020-10-13 ENCOUNTER — PATIENT MESSAGE (OUTPATIENT)
Dept: SURGERY | Facility: HOSPITAL | Age: 65
End: 2020-10-13

## 2020-10-13 ENCOUNTER — HOSPITAL ENCOUNTER (OUTPATIENT)
Facility: HOSPITAL | Age: 65
Discharge: HOME | End: 2020-10-13
Attending: INTERNAL MEDICINE | Admitting: INTERNAL MEDICINE
Payer: MEDICARE

## 2020-10-13 ENCOUNTER — HOSPITAL ENCOUNTER (OUTPATIENT)
Dept: RADIOLOGY | Facility: HOSPITAL | Age: 65
Discharge: HOME | End: 2020-10-13
Attending: INTERNAL MEDICINE
Payer: MEDICARE

## 2020-10-13 ENCOUNTER — ANESTHESIA (OUTPATIENT)
Dept: SURGERY | Facility: HOSPITAL | Age: 65
End: 2020-10-13
Payer: MEDICARE

## 2020-10-13 VITALS
WEIGHT: 194 LBS | HEIGHT: 62 IN | BODY MASS INDEX: 35.7 KG/M2 | OXYGEN SATURATION: 96 % | SYSTOLIC BLOOD PRESSURE: 153 MMHG | RESPIRATION RATE: 18 BRPM | TEMPERATURE: 97 F | HEART RATE: 70 BPM | DIASTOLIC BLOOD PRESSURE: 68 MMHG

## 2020-10-13 DIAGNOSIS — F32.9 REACTIVE DEPRESSION: ICD-10-CM

## 2020-10-13 DIAGNOSIS — R91.8 LUNG MASS: ICD-10-CM

## 2020-10-13 DIAGNOSIS — R91.8 LUNG MASS: Primary | ICD-10-CM

## 2020-10-13 LAB
POCT GLUCOSE: 138 MG/DL (ref 70–110)
POCT GLUCOSE: 154 MG/DL (ref 70–110)

## 2020-10-13 PROCEDURE — 31653 PR BRONCH W/ EBUS, SAMPLING 3 OR MORE NODES, INCL GUIDE: ICD-10-PCS | Mod: HCNC,GC,, | Performed by: INTERNAL MEDICINE

## 2020-10-13 PROCEDURE — 31627 NAVIGATIONAL BRONCHOSCOPY: CPT | Mod: HCNC,,, | Performed by: INTERNAL MEDICINE

## 2020-10-13 PROCEDURE — 88342 CHG IMMUNOCYTOCHEMISTRY: ICD-10-PCS | Mod: 26,HCNC,, | Performed by: PATHOLOGY

## 2020-10-13 PROCEDURE — 88341 IMHCHEM/IMCYTCHM EA ADD ANTB: CPT | Mod: HCNC | Performed by: PATHOLOGY

## 2020-10-13 PROCEDURE — 81445 SO NEO GSAP 5-50DNA/DNA&RNA: CPT | Mod: HCNC | Performed by: PATHOLOGY

## 2020-10-13 PROCEDURE — 88302 TISSUE EXAM BY PATHOLOGIST: CPT | Mod: 26,HCNC,, | Performed by: PATHOLOGY

## 2020-10-13 PROCEDURE — 31629 PR BRONCHOSCOPY,TRANSBRON ASPIR BX: ICD-10-PCS | Mod: HCNC,,, | Performed by: INTERNAL MEDICINE

## 2020-10-13 PROCEDURE — 36000707: Mod: HCNC | Performed by: INTERNAL MEDICINE

## 2020-10-13 PROCEDURE — 88360 TUMOR IMMUNOHISTOCHEM/MANUAL: CPT | Mod: HCNC | Performed by: PATHOLOGY

## 2020-10-13 PROCEDURE — 31628 PR BRONCHOSCOPY,TRANSBRONCH BIOPSY: ICD-10-PCS | Mod: HCNC,,, | Performed by: INTERNAL MEDICINE

## 2020-10-13 PROCEDURE — 82962 GLUCOSE BLOOD TEST: CPT | Mod: HCNC,91 | Performed by: INTERNAL MEDICINE

## 2020-10-13 PROCEDURE — 71000044 HC DOSC ROUTINE RECOVERY FIRST HOUR: Mod: HCNC | Performed by: INTERNAL MEDICINE

## 2020-10-13 PROCEDURE — 88342 IMHCHEM/IMCYTCHM 1ST ANTB: CPT | Mod: HCNC | Performed by: PATHOLOGY

## 2020-10-13 PROCEDURE — 88305 TISSUE EXAM BY PATHOLOGIST: CPT | Mod: HCNC | Performed by: PATHOLOGY

## 2020-10-13 PROCEDURE — 88305 TISSUE EXAM BY PATHOLOGIST: CPT | Mod: 59,HCNC | Performed by: PATHOLOGY

## 2020-10-13 PROCEDURE — 71000015 HC POSTOP RECOV 1ST HR: Mod: HCNC | Performed by: INTERNAL MEDICINE

## 2020-10-13 PROCEDURE — C1726 CATH, BAL DIL, NON-VASCULAR: HCPCS | Mod: HCNC | Performed by: INTERNAL MEDICINE

## 2020-10-13 PROCEDURE — 88173 PR  INTERPRETATION OF FNA SMEAR: ICD-10-PCS | Mod: 26,HCNC,, | Performed by: PATHOLOGY

## 2020-10-13 PROCEDURE — 31627 PR BRONCHOSCOPY,COMPUTER ASSIST/IMAGE-GUIDED NAVIGATION: ICD-10-PCS | Mod: HCNC,,, | Performed by: INTERNAL MEDICINE

## 2020-10-13 PROCEDURE — 88177 PR  EVALUATION OF FNA SMEAR TO DETERMINE ADEQUACY, EA ADD EVAL: ICD-10-PCS | Mod: 26,HCNC,, | Performed by: PATHOLOGY

## 2020-10-13 PROCEDURE — 88381 MICRODISSECTION MANUAL: CPT | Mod: HCNC | Performed by: PATHOLOGY

## 2020-10-13 PROCEDURE — D9220A PRA ANESTHESIA: ICD-10-PCS | Mod: HCNC,CRNA,, | Performed by: REGISTERED NURSE

## 2020-10-13 PROCEDURE — 88302 PR  SURG PATH,LEVEL II: ICD-10-PCS | Mod: 26,HCNC,, | Performed by: PATHOLOGY

## 2020-10-13 PROCEDURE — 31629 BRONCHOSCOPY/NEEDLE BX EACH: CPT | Mod: HCNC,,, | Performed by: INTERNAL MEDICINE

## 2020-10-13 PROCEDURE — 88177 CYTP FNA EVAL EA ADDL: CPT | Mod: 26,HCNC,, | Performed by: PATHOLOGY

## 2020-10-13 PROCEDURE — 71250 CT THORAX DX C-: CPT | Mod: TC,HCNC

## 2020-10-13 PROCEDURE — 37000009 HC ANESTHESIA EA ADD 15 MINS: Mod: HCNC | Performed by: INTERNAL MEDICINE

## 2020-10-13 PROCEDURE — 31653 BRONCH EBUS SAMPLNG 3/> NODE: CPT | Mod: HCNC,GC,, | Performed by: INTERNAL MEDICINE

## 2020-10-13 PROCEDURE — 82962 GLUCOSE BLOOD TEST: CPT | Mod: HCNC | Performed by: INTERNAL MEDICINE

## 2020-10-13 PROCEDURE — 31628 BRONCHOSCOPY/LUNG BX EACH: CPT | Mod: HCNC,,, | Performed by: INTERNAL MEDICINE

## 2020-10-13 PROCEDURE — 25000003 PHARM REV CODE 250: Mod: HCNC | Performed by: REGISTERED NURSE

## 2020-10-13 PROCEDURE — 88172 CYTP DX EVAL FNA 1ST EA SITE: CPT | Mod: 26,HCNC,, | Performed by: PATHOLOGY

## 2020-10-13 PROCEDURE — 37000008 HC ANESTHESIA 1ST 15 MINUTES: Mod: HCNC | Performed by: INTERNAL MEDICINE

## 2020-10-13 PROCEDURE — 88173 CYTOPATH EVAL FNA REPORT: CPT | Mod: 26,HCNC,, | Performed by: PATHOLOGY

## 2020-10-13 PROCEDURE — 71250 CT THORAX DX C-: CPT | Mod: 26,HCNC,, | Performed by: RADIOLOGY

## 2020-10-13 PROCEDURE — 88172 CYTP DX EVAL FNA 1ST EA SITE: CPT | Mod: 59,HCNC | Performed by: PATHOLOGY

## 2020-10-13 PROCEDURE — 88305 TISSUE EXAM BY PATHOLOGIST: CPT | Mod: 26,HCNC,, | Performed by: PATHOLOGY

## 2020-10-13 PROCEDURE — 88341 PR IHC OR ICC EACH ADD'L SINGLE ANTIBODY  STAINPR: ICD-10-PCS | Mod: 26,HCNC,, | Performed by: PATHOLOGY

## 2020-10-13 PROCEDURE — 88305 TISSUE EXAM BY PATHOLOGIST: ICD-10-PCS | Mod: 26,HCNC,, | Performed by: PATHOLOGY

## 2020-10-13 PROCEDURE — 88302 TISSUE EXAM BY PATHOLOGIST: CPT | Mod: HCNC | Performed by: PATHOLOGY

## 2020-10-13 PROCEDURE — 71250 CT VERAN CHEST WITHOUT CONTRAST: ICD-10-PCS | Mod: 26,HCNC,, | Performed by: RADIOLOGY

## 2020-10-13 PROCEDURE — 88342 IMHCHEM/IMCYTCHM 1ST ANTB: CPT | Mod: 26,HCNC,, | Performed by: PATHOLOGY

## 2020-10-13 PROCEDURE — 63600175 PHARM REV CODE 636 W HCPCS: Mod: HCNC | Performed by: REGISTERED NURSE

## 2020-10-13 PROCEDURE — 25000003 PHARM REV CODE 250: Mod: HCNC | Performed by: INTERNAL MEDICINE

## 2020-10-13 PROCEDURE — 88341 IMHCHEM/IMCYTCHM EA ADD ANTB: CPT | Mod: 26,HCNC,, | Performed by: PATHOLOGY

## 2020-10-13 PROCEDURE — D9220A PRA ANESTHESIA: ICD-10-PCS | Mod: HCNC,ANES,, | Performed by: ANESTHESIOLOGY

## 2020-10-13 PROCEDURE — 71000016 HC POSTOP RECOV ADDL HR: Mod: HCNC | Performed by: INTERNAL MEDICINE

## 2020-10-13 PROCEDURE — 88172 PR  EVALUATION OF FNA SMEAR TO DETERMINE ADEQUACY, FIRST EVAL: ICD-10-PCS | Mod: 26,HCNC,, | Performed by: PATHOLOGY

## 2020-10-13 PROCEDURE — D9220A PRA ANESTHESIA: Mod: HCNC,CRNA,, | Performed by: REGISTERED NURSE

## 2020-10-13 PROCEDURE — 27201423 OPTIME MED/SURG SUP & DEVICES STERILE SUPPLY: Mod: HCNC | Performed by: INTERNAL MEDICINE

## 2020-10-13 PROCEDURE — 36000706: Mod: HCNC | Performed by: INTERNAL MEDICINE

## 2020-10-13 PROCEDURE — D9220A PRA ANESTHESIA: Mod: HCNC,ANES,, | Performed by: ANESTHESIOLOGY

## 2020-10-13 PROCEDURE — 88177 CYTP FNA EVAL EA ADDL: CPT | Mod: HCNC | Performed by: PATHOLOGY

## 2020-10-13 PROCEDURE — 88173 CYTOPATH EVAL FNA REPORT: CPT | Mod: 59,HCNC | Performed by: PATHOLOGY

## 2020-10-13 RX ORDER — FENTANYL CITRATE 50 UG/ML
INJECTION, SOLUTION INTRAMUSCULAR; INTRAVENOUS
Status: DISCONTINUED | OUTPATIENT
Start: 2020-10-13 | End: 2020-10-13

## 2020-10-13 RX ORDER — HYDROMORPHONE HYDROCHLORIDE 1 MG/ML
0.2 INJECTION, SOLUTION INTRAMUSCULAR; INTRAVENOUS; SUBCUTANEOUS EVERY 5 MIN PRN
Status: CANCELLED | OUTPATIENT
Start: 2020-10-13

## 2020-10-13 RX ORDER — SODIUM CHLORIDE 9 MG/ML
INJECTION, SOLUTION INTRAVENOUS CONTINUOUS PRN
Status: DISCONTINUED | OUTPATIENT
Start: 2020-10-13 | End: 2020-10-13

## 2020-10-13 RX ORDER — PROPOFOL 10 MG/ML
VIAL (ML) INTRAVENOUS
Status: DISCONTINUED | OUTPATIENT
Start: 2020-10-13 | End: 2020-10-13

## 2020-10-13 RX ORDER — ONDANSETRON 2 MG/ML
INJECTION INTRAMUSCULAR; INTRAVENOUS
Status: DISCONTINUED | OUTPATIENT
Start: 2020-10-13 | End: 2020-10-13

## 2020-10-13 RX ORDER — KETOROLAC TROMETHAMINE 30 MG/ML
15 INJECTION, SOLUTION INTRAMUSCULAR; INTRAVENOUS EVERY 8 HOURS PRN
Status: CANCELLED | OUTPATIENT
Start: 2020-10-13 | End: 2020-10-15

## 2020-10-13 RX ORDER — VASOPRESSIN 20 [USP'U]/ML
INJECTION, SOLUTION INTRAMUSCULAR; SUBCUTANEOUS
Status: DISCONTINUED | OUTPATIENT
Start: 2020-10-13 | End: 2020-10-13

## 2020-10-13 RX ORDER — LIDOCAINE HYDROCHLORIDE 10 MG/ML
INJECTION, SOLUTION EPIDURAL; INFILTRATION; INTRACAUDAL; PERINEURAL
Status: DISCONTINUED | OUTPATIENT
Start: 2020-10-13 | End: 2020-10-13 | Stop reason: HOSPADM

## 2020-10-13 RX ORDER — ONDANSETRON 2 MG/ML
4 INJECTION INTRAMUSCULAR; INTRAVENOUS ONCE AS NEEDED
Status: CANCELLED | OUTPATIENT
Start: 2020-10-13 | End: 2032-03-11

## 2020-10-13 RX ORDER — PHENYLEPHRINE HYDROCHLORIDE 10 MG/ML
INJECTION INTRAVENOUS
Status: DISCONTINUED | OUTPATIENT
Start: 2020-10-13 | End: 2020-10-13

## 2020-10-13 RX ORDER — PROPOFOL 10 MG/ML
VIAL (ML) INTRAVENOUS CONTINUOUS PRN
Status: DISCONTINUED | OUTPATIENT
Start: 2020-10-13 | End: 2020-10-13

## 2020-10-13 RX ORDER — MIDAZOLAM HYDROCHLORIDE 1 MG/ML
INJECTION, SOLUTION INTRAMUSCULAR; INTRAVENOUS
Status: DISCONTINUED | OUTPATIENT
Start: 2020-10-13 | End: 2020-10-13

## 2020-10-13 RX ORDER — DEXAMETHASONE SODIUM PHOSPHATE 4 MG/ML
INJECTION, SOLUTION INTRA-ARTICULAR; INTRALESIONAL; INTRAMUSCULAR; INTRAVENOUS; SOFT TISSUE
Status: DISCONTINUED | OUTPATIENT
Start: 2020-10-13 | End: 2020-10-13

## 2020-10-13 RX ORDER — OXYCODONE AND ACETAMINOPHEN 5; 325 MG/1; MG/1
1 TABLET ORAL
Status: CANCELLED | OUTPATIENT
Start: 2020-10-13

## 2020-10-13 RX ORDER — LIDOCAINE HYDROCHLORIDE 20 MG/ML
INJECTION INTRAVENOUS
Status: DISCONTINUED | OUTPATIENT
Start: 2020-10-13 | End: 2020-10-13

## 2020-10-13 RX ADMIN — PHENYLEPHRINE HYDROCHLORIDE 100 MCG: 10 INJECTION INTRAVENOUS at 10:10

## 2020-10-13 RX ADMIN — SODIUM CHLORIDE, SODIUM GLUCONATE, SODIUM ACETATE, POTASSIUM CHLORIDE, MAGNESIUM CHLORIDE, SODIUM PHOSPHATE, DIBASIC, AND POTASSIUM PHOSPHATE: .53; .5; .37; .037; .03; .012; .00082 INJECTION, SOLUTION INTRAVENOUS at 10:10

## 2020-10-13 RX ADMIN — ONDANSETRON 4 MG: 2 INJECTION, SOLUTION INTRAMUSCULAR; INTRAVENOUS at 09:10

## 2020-10-13 RX ADMIN — PROPOFOL 30 MG: 10 INJECTION, EMULSION INTRAVENOUS at 10:10

## 2020-10-13 RX ADMIN — VASOPRESSIN 0.25 UNITS: 20 INJECTION INTRAVENOUS at 10:10

## 2020-10-13 RX ADMIN — PROPOFOL 100 MCG/KG/MIN: 10 INJECTION, EMULSION INTRAVENOUS at 09:10

## 2020-10-13 RX ADMIN — PROPOFOL 50 MG: 10 INJECTION, EMULSION INTRAVENOUS at 10:10

## 2020-10-13 RX ADMIN — PROPOFOL 50 MG: 10 INJECTION, EMULSION INTRAVENOUS at 09:10

## 2020-10-13 RX ADMIN — FENTANYL CITRATE 25 MCG: 50 INJECTION, SOLUTION INTRAMUSCULAR; INTRAVENOUS at 09:10

## 2020-10-13 RX ADMIN — FENTANYL CITRATE 25 MCG: 50 INJECTION, SOLUTION INTRAMUSCULAR; INTRAVENOUS at 10:10

## 2020-10-13 RX ADMIN — PROPOFOL 130 MG: 10 INJECTION, EMULSION INTRAVENOUS at 09:10

## 2020-10-13 RX ADMIN — SODIUM CHLORIDE: 0.9 INJECTION, SOLUTION INTRAVENOUS at 09:10

## 2020-10-13 RX ADMIN — MIDAZOLAM HYDROCHLORIDE 1 MG: 1 INJECTION, SOLUTION INTRAMUSCULAR; INTRAVENOUS at 09:10

## 2020-10-13 RX ADMIN — VASOPRESSIN 0.5 UNITS: 20 INJECTION INTRAVENOUS at 10:10

## 2020-10-13 RX ADMIN — DEXAMETHASONE SODIUM PHOSPHATE 8 MG: 4 INJECTION, SOLUTION INTRAMUSCULAR; INTRAVENOUS at 09:10

## 2020-10-13 RX ADMIN — LIDOCAINE HYDROCHLORIDE 100 MG: 20 INJECTION, SOLUTION INTRAVENOUS at 09:10

## 2020-10-13 NOTE — ANESTHESIA PROCEDURE NOTES
Intubation  Performed by: Lee Dominguez CRNA  Authorized by: Madalyn Carter MD     Intubation:     Induction:  Intravenous    Intubated:  Postinduction    Mask Ventilation:  Easy mask    Attempts:  1    Attempted By:  CRNA    Method of Intubation:  Other (see comments) (LMA placement)    Difficult Airway Encountered?: No      Complications:  None    Airway Device:  Supraglottic airway/LMA    Airway Device Size:  4.0    Style/Cuff Inflation:  Cuffed    Secured at:  The lips    Placement Verified By:  Capnometry    Complicating Factors:  None    Findings Post-Intubation:  BS equal bilateral

## 2020-10-13 NOTE — PROGRESS NOTES
Breann with pacemaker clinic states magnet needs to be used if cautery will be used during procedure.

## 2020-10-13 NOTE — ANESTHESIA PREPROCEDURE EVALUATION
[facility]  Anesthesia Pre-Operative Evaluation       Patient Name: Hannah Montoya  YOB: 1955  MRN: [id number]  [facility]: [id number]      Code Status: Prior   Date of Procedure: 10/13/2020  Anesthesia: General Procedure: Procedure(s) (LRB):  BRONCHOSCOPY, NAVIGATIONAL (N/A)  Pre-Operative Diagnosis: Lung mass [R91.8]  Proceduralist: Surgeon(s) and Role:     * Jamilah Weaver MD - Primary        SUBJECTIVE:   Hannah Montoya is a 65 y.o. female who  has a past medical history of AICD (automatic cardioverter/defibrillator) present, Asthma, Breast cancer (2016), Cardiac pacemaker, Cardiomyopathy, COPD (chronic obstructive pulmonary disease), Diabetes mellitus, type 2, Hyperglycemia, Hyperlipidemia, Hypertension, Malignant neoplasm of overlapping sites of female breast (2/12/2016), Nuclear sclerosis of both eyes (8/12/2020), and Respiratory distress (3/12/2020).. NICM, LBBB, CRT-D, HTN, HLD, DM, hx of rt breast CA   Medtronic ICD biventricular for LBBB with NiCM   DDD  bpm    Presenting egram demonstrates AS-BiV    Device fxn WNL    Autocapture algorithms are On in RA/RV    RA pacing <1%, Total  pacing 99.7%    Atrial arrhythmias: None    Ventricular arrhythmias: None    Battery Status/Longevity: 3.7 years    F/U via remote interrogation in 3 mos    Report prepared by ED Morocho        CT surgery consult re: possible lung CA (RLL mass noted to CXR in March. PET 9/29/2020 showing hypermetabolic right lower lobe mass concerning for malignancy with additional hypermetabolic focus in the right hilum consistent with metastatic disease.)   Device Interrogation (10/1/2020) reveals an intrinsic SR with stable lead and device function. No arrhythmias or treated episodes were noted.  She paces 0% in the RA and 100% in the BiV (RV and His). Estimated battery longevity 3.4 years.   Revised cardiac risk index (RCRI) score is 2.    she has a current medication list which  includes the following long-term medication(s): albuterol sulfate, albuterol-ipratropium, atorvastatin, bupropion, carvedilol, cetirizine, fluticasone-salmeterol 250-50 mcg/dose, furosemide, lisinopril, metformin, sertraline, and spiriva with handihaler.   ALLERGIES:     Review of patient's allergies indicates:   Allergen Reactions    Adhesive Rash     tegaderm burns and blistered skin     LDA:      Lines/Drains/Airways     None                Anesthesia Evaluation      Airway   Mallampati: II  Neck ROM: Normal ROM  Dental    (+) In tact    Pulmonary    (+) COPD,   (-) asthma  Cardiovascular   (+) hypertension, dysrhythmias, CHF, orthopnea,     NYHA Classification: III  Rate: Normal    Neuro/Psych      GI/Hepatic/Renal      Endo/Other    (+) diabetes mellitus,   Abdominal                     MEDICATIONS:     No current facility-administered medications for this encounter.      Current Outpatient Medications   Medication Sig Dispense Refill    albuterol sulfate (PROAIR RESPICLICK) 90 mcg/actuation AePB Inhale 2 puffs into the lungs every 4 (four) hours as needed. Rescue 1 each 5    albuterol-ipratropium (DUO-NEB) 2.5 mg-0.5 mg/3 mL nebulizer solution Take 3 mLs by nebulization every 6 (six) hours while awake. Rescue 30 mL 5    atorvastatin (LIPITOR) 10 MG tablet Take 1 tablet (10 mg total) by mouth once daily. 90 tablet 3    buPROPion (WELLBUTRIN XL) 150 MG TB24 tablet TAKE 1 TABLET BY MOUTH EVERY DAY 30 tablet 0    carvediloL (COREG) 25 MG tablet TAKE 1 TABLET(25 MG) BY MOUTH TWICE DAILY 180 tablet 3    cetirizine (ZYRTEC) 10 MG tablet Take 10 mg by mouth every evening.       fluticasone-salmeterol diskus inhaler 250-50 mcg INHALE 1 PUFF INTO THE LUNGS 2 (TWO) TIMES DAILY. (Patient taking differently: INHALE 1 PUFF INTO THE LUNGS 2 (TWO) TIMES DAILY.) 60 each 5    furosemide (LASIX) 40 MG tablet Take 1 tablet (40 mg total) by mouth once daily. TAKE 1 TABLET(40 MG) BY MOUTH TWICE DAILY 90 tablet 1     lisinopril 10 MG tablet Take 1 tablet (10 mg total) by mouth once daily. (Patient not taking: Reported on 10/1/2020) 90 tablet 3    metFORMIN (GLUCOPHAGE) 500 MG tablet Take 1 tablet (500 mg total) by mouth 2 (two) times daily. 180 tablet 1    multivitamin (ONE DAILY MULTIVITAMIN) per tablet Take 1 tablet by mouth once daily.      sertraline (ZOLOFT) 100 MG tablet Take 1 tablet (100 mg total) by mouth every evening. 90 tablet 1    SPIRIVA WITH HANDIHALER 18 mcg inhalation capsule INHALE THE CONTENTS OF 1 CAPSULE EVERY DAY 90 capsule 0    TRUETEST TEST STRIPS Strp test once EVERY MORNING 100 strip 1          History:   There are no hospital problems to display for this patient.    Surgical History:    has a past surgical history that includes Cholecystectomy;  section; Tubal ligation; Breast biopsy (Right, 2016); Breast lumpectomy (Right); Revision of implantable cardioverter-defibrillator (ICD) electrode lead placement (N/A, 6/15/2018); Cardiac catheterization (Bilateral, 2017); Cardiac defibrillator placement (Left, 08/10/2017); Cardiac defibrillator placement (Left, 2018); and Lung biopsy (N/A, 2020).   Social History:    reports previously being sexually active and has had partner(s) who are Male.  reports that she quit smoking about 4 years ago. Her smoking use included cigarettes. She has a 20.00 pack-year smoking history. She has never used smokeless tobacco. She reports current alcohol use. She reports that she does not use drugs.     OBJECTIVE:     Vital Signs (Most Recent):    Vital Signs Range (Last 24H):          There is no height or weight on file to calculate BMI.   Wt Readings from Last 4 Encounters:   10/05/20 89.1 kg (196 lb 6.9 oz)   10/01/20 88 kg (194 lb)   10/01/20 88.9 kg (195 lb 15.8 oz)   20 88 kg (194 lb 1.8 oz)       Significant Labs:  Lab Results   Component Value Date    WBC 5.48 2020    HGB 12.9 2020    HCT 40.2 2020      05/26/2020     08/03/2020    K 4.1 08/03/2020     08/03/2020    CREATININE 0.8 10/07/2020    BUN 28 (H) 08/03/2020    CO2 28 08/03/2020     (H) 08/03/2020    CALCIUM 10.0 08/03/2020    MG 1.7 05/03/2017    ALKPHOS 59 08/03/2020    ALT 34 08/03/2020    AST 25 08/03/2020    ALBUMIN 4.1 08/03/2020    INR 0.9 05/26/2020    APTT 24.5 06/07/2018    HGBA1C 7.0 (H) 08/03/2020    TROPONINI <0.006 03/11/2020    BNP 17 03/11/2020     No LMP recorded (lmp unknown). Patient is postmenopausal.  Recent Results (from the past 72 hour(s))   COVID-19 Routine Screening    Collection Time: 10/12/20 12:25 PM   Result Value Ref Range    SARS-CoV2 (COVID-19) Qualitative PCR Not Detected Not Detected       EKG:   Results for orders placed or performed during the hospital encounter of 06/15/18   EKG 12-lead    Collection Time: 06/16/18  7:47 AM    Narrative    Test Reason : Z95.810,  Blood Pressure : * mmHG  Vent. Rate : 078 BPM     Atrial Rate : 078 BPM     P-R Int : 136 ms          QRS Dur : 134 ms      QT Int : 472 ms       P-R-T Axes : 000 -17 -24 degrees     QTc Int : 538 ms    Atrial-sensed ventricular-paced rhythm  Abnormal ECG  When compared with ECG of 15-EDY-2018 09:13,  No significant change was found  Confirmed by Marcia Shetty MD ([id number]) on 6/19/2018 1:35:10 PM    Referred By: JESSICA THOMPSON           Confirmed By:Marcia Shetty MD       TTE:  No results found for this or any previous visit.  No results found for: EF   Results for orders placed or performed during the hospital encounter of 10/16/18   2D echo with color flow doppler   Result Value Ref Range    QEF 53 55 - 65    Mitral Valve Regurgitation TRIVIAL TO MILD     Diastolic Dysfunction No     Aortic Valve Regurgitation TRIVIAL     Est. PA Systolic Pressure 31.09     Tricuspid Valve Regurgitation TRIVIAL      KYUNG:  No results found for this or any previous visit.  Stress Test:  No results found for this or any previous visit.   LHC:  No results found  for this or any previous visit.   PFT:  No results found for: FEV1, FVC, FWZ4ZEM, TLC, DLCO     ASSESSMENT/PLAN:         Pre-op Assessment    I have reviewed the Patient Summary Reports.    I have reviewed the Nursing Notes.    I have reviewed the Medications.     Review of Systems  Anesthesia Hx:  No problems with previous Anesthesia    Hematology/Oncology:  Hematology Normal   Oncology Normal     EENT/Dental:EENT/Dental Normal   Cardiovascular:   Hypertension Dysrhythmias CHF Orthopnea NYHA Classification III    Pulmonary:   COPD Denies Asthma.    Renal/:  Renal/ Normal     Hepatic/GI:  Hepatic/GI Normal    Musculoskeletal:  Musculoskeletal Normal    Neurological:  Neurology Normal    Endocrine:   Diabetes    Dermatological:  Skin Normal    Psych:  Psychiatric Normal           Physical Exam  General:  Well nourished    Airway/Jaw/Neck:  Airway Findings: Mouth Opening: Normal Tongue: Normal  General Airway Assessment: Adult  Mallampati: II  Jaw/Neck Findings:  Neck ROM: Normal ROM     Eyes/Ears/Nose:  Eyes/Ears/Nose Findings:    Dental:  Dental Findings: In tact   Chest/Lungs:  Chest/Lungs Findings: Clear to auscultation, Normal Respiratory Rate  Subcutaneous device left chest     Heart/Vascular:  Heart Findings: Rate: Normal  Rhythm: Regular Rhythm  Sounds: Normal  Heart Murmur  Vascular Findings:        Mental Status:  Mental Status Findings:  Cooperative, Alert and Oriented         Anesthesia Plan  Type of Anesthesia, risks & benefits discussed:  Anesthesia Type:  general, MAC  Patient's Preference: General/MAC  Intra-op Monitoring Plan: standard ASA monitors  Intra-op Monitoring Plan Comments:   Post Op Pain Control Plan:   Post Op Pain Control Plan Comments: IV meds as needed  Induction:   IV  Beta Blocker:         Informed Consent: Patient understands risks and agrees with Anesthesia plan.  Questions answered. Anesthesia consent signed with patient.  ASA Score: 4     Day of Surgery Review of History &  Physical:    H&P update referred to the provider.     Anesthesia Plan Notes: Discussed anesthetic options, pt understands and agrees with plan        Ready For Surgery From Anesthesia Perspective.

## 2020-10-13 NOTE — ANESTHESIA POSTPROCEDURE EVALUATION
Anesthesia Post Evaluation    Patient: Hannah Montoya    Procedure(s) Performed: Procedure(s) (LRB):  BRONCHOSCOPY, NAVIGATIONAL (N/A)    Final Anesthesia Type: general    Patient location during evaluation: PACU  Patient participation: Yes- Able to Participate  Level of consciousness: awake and alert  Post-procedure vital signs: reviewed and stable  Pain management: adequate  Airway patency: patent    PONV status at discharge: No PONV  Anesthetic complications: no      Cardiovascular status: blood pressure returned to baseline  Respiratory status: unassisted  Hydration status: euvolemic  Follow-up not needed.          Vitals Value Taken Time   /68 10/13/20 1302   Temp 36.3 °C (97.4 °F) 10/13/20 1314   Pulse 70 10/13/20 1314   Resp 28 10/13/20 1314   SpO2 96 % 10/13/20 1314   Vitals shown include unvalidated device data.      No case tracking events are documented in the log.      Pain/James Score: James Score: 9 (10/13/2020 12:30 PM)

## 2020-10-13 NOTE — TRANSFER OF CARE
"Anesthesia Transfer of Care Note    Patient: Hannah Montoya    Procedure(s) Performed: Procedure(s) (LRB):  BRONCHOSCOPY, NAVIGATIONAL (N/A)    Patient location: [facility]    Anesthesia Type: general    Transport from OR: Transported from OR on 6-10 L/min O2 by face mask with adequate spontaneous ventilation    Post pain: adequate analgesia    Post assessment: no apparent anesthetic complications and tolerated procedure well    Post vital signs: stable    Level of consciousness: sedated    Nausea/Vomiting: no nausea/vomiting    Complications: none    Transfer of care protocol was followedComments: Oral airway in place; pt. Exchanging well      Last vitals:   Visit Vitals  BP (!) 174/77 (BP Location: Left arm, Patient Position: Lying)   Pulse 75   Temp 37.5 °C (99.5 °F) (Skin)   Resp 18   Ht 5' 2" (1.575 m)   Wt 88 kg (194 lb)   LMP  (LMP Unknown)   SpO2 97%   Breastfeeding No   BMI 35.48 kg/m²     "

## 2020-10-13 NOTE — DISCHARGE SUMMARY
[facility]  Discharge Note  Short Stay    Procedure(s) (LRB):  BRONCHOSCOPY, NAVIGATIONAL (N/A)    OUTCOME: Patient tolerated treatment/procedure well without complication and is now ready for discharge.    DISPOSITION: Home or Self Care    FINAL DIAGNOSIS:  Lung Mass    FOLLOWUP: Will call with results to determine next best step    DISCHARGE INSTRUCTIONS:    Discharge Procedure Orders   Diet general [facility]  Pulmonology  Discharge Summary      Patient Name: Hannah Montoya  MRN: [id number]  Admission Date: 10/13/2020  Hospital Length of Stay: 0 days  Discharge Date and Time:  10/13/2020 11:20 AM  Attending Physician: Jamilah Weaver MD   Discharging Provider: Jamilah Weaver MD  Primary Care Provider: Emanuel Chavez MD    HPI: 66 yo with RLL mass    Procedure(s) (LRB):  BRONCHOSCOPY, NAVIGATIONAL (N/A)    Indwelling Lines/Drains at Time of Discharge:   Lines/Drains/Airways     None                 Hospital Course: Bronchoscopy complete, see note.  Tolerated well        Significant Labs:  All pertinent labs within the past 24 hours have been reviewed.    Significant Imaging:  CT: I have reviewed all pertinent results/findings within the past 24 hours and my personal findings are:  Persistent RLL mass and hilar adenopathy    Pending Diagnostic Studies:     Procedure Component Value Units Date/Time    Cytology- FNA Radiology Guided, Bronch/EBUS, EUS/GI [[id number]] Collected: 10/13/20 1054    Order Status: Sent Lab Status: In process Updated: 10/13/20 1055    Specimen to Pathology, Surgery Other [[id number]] Collected: 10/13/20 1054    Order Status: Sent Lab Status: In process Updated: 10/13/20 1055        Final Active Diagnoses:    Diagnosis Date Noted POA    Lung mass [R91.8] 03/12/2020 Yes      Problems Resolved During this Admission:       Discharged Condition: stable    Disposition: Home or Self Care    Follow Up:  Follow-up Information     Call in 1 week to follow up.                Patient Instructions:      Diet general     Medications:  Reconciled Home Medications:      Medication List      CONTINUE taking these medications    albuterol sulfate 90 mcg/actuation inhaler  Commonly known as: PROAIR RESPICLICK  Inhale 2 puffs into the lungs every 4 (four) hours as needed. Rescue     albuterol-ipratropium 2.5 mg-0.5 mg/3 mL nebulizer solution  Commonly known as: DUO-NEB  Take 3 mLs by nebulization every 6 (six) hours while awake. Rescue     atorvastatin 10 MG tablet  Commonly known as: LIPITOR  Take 1 tablet (10 mg total) by mouth once daily.     buPROPion 150 MG TB24 tablet  Commonly known as: WELLBUTRIN XL  TAKE 1 TABLET BY MOUTH EVERY DAY     carvediloL 25 MG tablet  Commonly known as: COREG  TAKE 1 TABLET(25 MG) BY MOUTH TWICE DAILY     cetirizine 10 MG tablet  Commonly known as: ZYRTEC  Take 10 mg by mouth every evening.     fluticasone-salmeterol 250-50 mcg/dose 250-50 mcg/dose diskus inhaler  Commonly known as: WIXELA INHUB  INHALE 1 PUFF INTO THE LUNGS 2 (TWO) TIMES DAILY.     furosemide 40 MG tablet  Commonly known as: LASIX  Take 1 tablet (40 mg total) by mouth once daily. TAKE 1 TABLET(40 MG) BY MOUTH TWICE DAILY     lisinopriL 10 MG tablet  Take 1 tablet (10 mg total) by mouth once daily.     metFORMIN 500 MG tablet  Commonly known as: GLUCOPHAGE  Take 1 tablet (500 mg total) by mouth 2 (two) times daily.     ONE DAILY MULTIVITAMIN per tablet  Generic drug: multivitamin  Take 1 tablet by mouth once daily.     sertraline 100 MG tablet  Commonly known as: ZOLOFT  Take 1 tablet (100 mg total) by mouth every evening.     SPIRIVA WITH HANDIHALER 18 mcg inhalation capsule  Generic drug: tiotropium  INHALE THE CONTENTS OF 1 CAPSULE EVERY DAY     TRUETEST TEST STRIPS Strp  Generic drug: blood sugar diagnostic  test once EVERY MORNING            Jamilah Weaver MD  Pulmonology  [facility]

## 2020-10-13 NOTE — DISCHARGE INSTRUCTIONS
Flexible Bronchoscopy  A flexible bronchoscopy is an exam of the airways of your lungs. A thin, flexible tube called a bronchoscope is used. It has a light and small camera that allow the healthcare provider to view your airways.    Before your test  · Follow your healthcare provider's instructions carefully. If you dont, the exam may be canceled. Or you may need to take it again.  · If you are taking blood-thinning medicine, ask your healthcare provider if you should stop taking the medicine before this test.  · Have no food or drink for at least 8 hours before the test. Also, avoid smoking for 24 hours before the test.  · You will need to remove any dentures or removable devices from your mouth.  · Right before the test, you will be given sedating medicines to help you relax. The medicine may be given by an IV (intravenously) into one of your veins. In addition, your nose and throat may be numbed with a special spray to help prevent gagging and coughing.  · If you are having this test as an outpatient, make sure you have an adult friend or family member to drive you home.  During your test  Bronchoscopy takes 45 to 60 minutes and includes the following steps:  · You may be given medicine (anesthesia) so that you are unconscious or asleep during the procedure.  · The healthcare provider inserts the tube into your nose or mouth.  · If you have not been given anesthesia, you might feel a gagging sensation. To help ease this feeling, you will be told to swallow or take deep breaths. Your airway will remain open even with the tube in place. But you wont be able to talk.  · The provider checks your breathing passage. He or she may also remove tiny tissue samples for biopsy.  After your test  · You may have a mild sore throat or cough. Your voice may also be hoarse.  · Don't eat or drink until the anesthesia wears off.  · If you had a biopsy, you might see traces of blood being coughed up.  When to call your  healthcare provider  Call your healthcare provider right away if you have any of the following:  · Shortness of breath  · Chest pain  · Bleeding from your nose or throat  · Coughing up a large amount of blood  · A fever above 100.4°F (38°C) for more than 24 hours  Call 911  Call 911 if you have:  · Chest pain  · Severe shortness of breath   Date Last Reviewed: 12/1/2016  © 6141-6964 [employer]. [address] [place] [address], [place] [address]. All rights reserved. This information is not intended as a substitute for professional medical care. Always follow your healthcare professional's instructions.

## 2020-10-13 NOTE — INTERVAL H&P NOTE
The patient has been examined and the H&P has been reviewed:    I concur with the findings and no changes have occurred since H&P was written.    Anesthesia/Surgery risks, benefits and alternative options discussed and understood by patient/family.          Active Hospital Problems    Diagnosis  POA    Lung mass [R91.8]  Yes     3.2 rll cm mass and rml atelectasis.   With hilar adenopathy  Plan for bronchoscopy with EBUS and navigation Oct 13 for diagnosis and staging.  High risk for lung [id number].    I have explained the risks, benefits and alternatives of the procedure in detail.  The patient voices understanding and all questions have been answered.  The patient agrees to proceed as planned.        Resolved Hospital Problems   No resolved problems to display.

## 2020-10-14 ENCOUNTER — HOSPITAL ENCOUNTER (OUTPATIENT)
Dept: PULMONOLOGY | Facility: CLINIC | Age: 65
Discharge: HOME | End: 2020-10-14
Payer: MEDICARE

## 2020-10-14 ENCOUNTER — HOSPITAL ENCOUNTER (OUTPATIENT)
Dept: RADIOLOGY | Facility: HOSPITAL | Age: 65
Discharge: HOME | End: 2020-10-14
Attending: PHYSICIAN ASSISTANT
Payer: MEDICARE

## 2020-10-14 ENCOUNTER — HOSPITAL ENCOUNTER (OUTPATIENT)
Dept: CARDIOLOGY | Facility: HOSPITAL | Age: 65
Discharge: HOME | End: 2020-10-14
Attending: PHYSICIAN ASSISTANT
Payer: MEDICARE

## 2020-10-14 ENCOUNTER — TELEPHONE (OUTPATIENT)
Dept: HEMATOLOGY/ONCOLOGY | Facility: CLINIC | Age: 65
End: 2020-10-14

## 2020-10-14 VITALS
SYSTOLIC BLOOD PRESSURE: 148 MMHG | HEART RATE: 61 BPM | WEIGHT: 198 LBS | HEIGHT: 62 IN | BODY MASS INDEX: 36.44 KG/M2 | RESPIRATION RATE: 20 BRPM | DIASTOLIC BLOOD PRESSURE: 80 MMHG

## 2020-10-14 DIAGNOSIS — R06.09 DOE (DYSPNEA ON EXERTION): ICD-10-CM

## 2020-10-14 DIAGNOSIS — R91.8 LUNG MASS: ICD-10-CM

## 2020-10-14 DIAGNOSIS — C34.91 NSCLC OF RIGHT LUNG: ICD-10-CM

## 2020-10-14 DIAGNOSIS — I25.10 ATHEROSCLEROSIS OF NATIVE CORONARY ARTERY OF NATIVE HEART WITHOUT ANGINA PECTORIS: ICD-10-CM

## 2020-10-14 DIAGNOSIS — G93.89 ENCEPHALOMALACIA ON IMAGING STUDY: ICD-10-CM

## 2020-10-14 LAB
DLCO ADJ PRE: 13 ML/(MIN*MMHG) (ref 15.22–26.68)
DLCO SINGLE BREATH LLN: 15.22
DLCO SINGLE BREATH PRE REF: 62.1 %
DLCO SINGLE BREATH REF: 20.95
DLCOC SBVA LLN: 2.98
DLCOC SBVA PRE REF: 96.2 %
DLCOC SBVA REF: 4.58
DLCOC SINGLE BREATH LLN: 15.22
DLCOC SINGLE BREATH PRE REF: 62.1 %
DLCOC SINGLE BREATH REF: 20.95
DLCOCSBVAULN: 6.18
DLCOCSINGLEBREATHULN: 26.68
DLCOSINGLEBREATHULN: 26.68
DLCOVA LLN: 2.98
DLCOVA PRE REF: 96.2 %
DLCOVA PRE: 4.41 ML/(MIN*MMHG*L) (ref 2.98–6.18)
DLCOVA REF: 4.58
DLCOVAULN: 6.18
DLVAADJ PRE: 4.41 ML/(MIN*MMHG*L) (ref 2.98–6.18)
FEF 25 75 LLN: 1.01
FEF 25 75 PRE REF: 26.9 %
FEF 25 75 REF: 2.04
FEV05 LLN: 0.84
FEV05 REF: 1.7
FEV1 FVC LLN: 67
FEV1 FVC PRE REF: 74.3 %
FEV1 FVC REF: 79
FEV1 LLN: 1.67
FEV1 PRE REF: 48.7 %
FEV1 REF: 2.24
FINAL PATHOLOGIC DIAGNOSIS: NORMAL
FVC LLN: 2.13
FVC PRE REF: 65.2 %
FVC REF: 2.84
GROSS: NORMAL
IVC PRE: 1.43 L (ref 2.13–3.55)
IVC SINGLE BREATH LLN: 2.13
IVC SINGLE BREATH PRE REF: 50.4 %
IVC SINGLE BREATH REF: 2.84
IVCSINGLEBREATHULN: 3.55
MVV LLN: 69
MVV PRE REF: 38.2 %
MVV REF: 81
PEF LLN: 4.19
PEF PRE REF: 56 %
PEF REF: 5.79
PHYSICIAN COMMENT: ABNORMAL
PRE DLCO: 13 ML/(MIN*MMHG) (ref 15.22–26.68)
PRE FEF 25 75: 0.55 L/S (ref 1.01–3.06)
PRE FET 100: 6.62 SEC
PRE FEV05 REF: 42.2 %
PRE FEV1 FVC: 58.89 % (ref 66.84–91.7)
PRE FEV1: 1.09 L (ref 1.67–2.8)
PRE FEV5: 0.71 L (ref 0.84–2.55)
PRE FVC: 1.85 L (ref 2.13–3.55)
PRE MVV: 31 L/MIN (ref 68.9–93.22)
PRE PEF: 3.24 L/S (ref 4.19–7.39)
VA PRE: 2.95 L (ref 4.42–4.42)
VA SINGLE BREATH LLN: 4.42
VA SINGLE BREATH PRE REF: 66.7 %
VA SINGLE BREATH REF: 4.42
VASINGLEBREATHULN: 4.42

## 2020-10-14 PROCEDURE — 93351 STRESS ECHO (CUPID ONLY): ICD-10-PCS | Mod: 26,HCNC,, | Performed by: INTERNAL MEDICINE

## 2020-10-14 PROCEDURE — 94010 BREATHING CAPACITY TEST: ICD-10-PCS | Mod: HCNC,S$GLB,, | Performed by: INTERNAL MEDICINE

## 2020-10-14 PROCEDURE — 94729 DIFFUSING CAPACITY: CPT | Mod: HCNC,S$GLB,, | Performed by: INTERNAL MEDICINE

## 2020-10-14 PROCEDURE — 93880 US CAROTID BILATERAL: ICD-10-PCS | Mod: 26,HCNC,, | Performed by: RADIOLOGY

## 2020-10-14 PROCEDURE — 93351 STRESS TTE COMPLETE: CPT | Mod: 26,HCNC,, | Performed by: INTERNAL MEDICINE

## 2020-10-14 PROCEDURE — 94729 PR C02/MEMBANE DIFFUSE CAPACITY: ICD-10-PCS | Mod: HCNC,S$GLB,, | Performed by: INTERNAL MEDICINE

## 2020-10-14 PROCEDURE — 94010 BREATHING CAPACITY TEST: CPT | Mod: HCNC,S$GLB,, | Performed by: INTERNAL MEDICINE

## 2020-10-14 PROCEDURE — 93880 EXTRACRANIAL BILAT STUDY: CPT | Mod: TC,HCNC

## 2020-10-14 PROCEDURE — 93351 STRESS TTE COMPLETE: CPT | Mod: HCNC

## 2020-10-14 PROCEDURE — 63600175 PHARM REV CODE 636 W HCPCS: Mod: HCNC | Performed by: PHYSICIAN ASSISTANT

## 2020-10-14 PROCEDURE — 63600150 PHARM REV CODE 636: Mod: HCNC | Performed by: PHYSICIAN ASSISTANT

## 2020-10-14 PROCEDURE — 93880 EXTRACRANIAL BILAT STUDY: CPT | Mod: 26,HCNC,, | Performed by: RADIOLOGY

## 2020-10-14 RX ORDER — ATROPINE SULFATE 0.1 MG/ML
0.5 INJECTION INTRAVENOUS
Status: COMPLETED | OUTPATIENT
Start: 2020-10-14 | End: 2020-10-14

## 2020-10-14 RX ORDER — DOBUTAMINE HYDROCHLORIDE 100 MG/100ML
40 INJECTION INTRAVENOUS
Status: COMPLETED | OUTPATIENT
Start: 2020-10-14 | End: 2020-10-14

## 2020-10-14 RX ORDER — BUPROPION HYDROCHLORIDE 150 MG/1
150 TABLET ORAL DAILY
Qty: 90 TABLET | Refills: 3 | Status: SHIPPED | OUTPATIENT
Start: 2020-10-14 | End: 2020-10-28

## 2020-10-14 RX ADMIN — ATROPINE SULFATE 0.5 MG: 0.1 INJECTION PARENTERAL at 03:10

## 2020-10-14 RX ADMIN — DOBUTAMINE IN DEXTROSE 40 MCG/KG/MIN: 100 INJECTION, SOLUTION INTRAVENOUS at 03:10

## 2020-10-14 NOTE — NURSING
Received call from pt asking for instructions for dobutamine stress echo that is scheduled for tomorrow.  Called echo lab and confirmed that pt can take her high blood pressure medication and not to take her diabetic medication if fasting has started.  Verbalized understanding from pt.

## 2020-10-15 ENCOUNTER — PATIENT MESSAGE (OUTPATIENT)
Dept: INFECTIOUS DISEASES | Facility: CLINIC | Age: 65
End: 2020-10-15

## 2020-10-15 DIAGNOSIS — R06.02 SHORTNESS OF BREATH: ICD-10-CM

## 2020-10-15 LAB
ASCENDING AORTA: 2.57 CM
BSA FOR ECHO PROCEDURE: 1.98 M2
CV ECHO LV RWT: 0.24 CM
CV STRESS BASE HR: 75 BPM
DIASTOLIC BLOOD PRESSURE: 69 MMHG
DOP CALC LVOT AREA: 3.2 CM2
DOP CALC LVOT DIAMETER: 2.01 CM
DOP CALC LVOT PEAK VEL: 0.87 M/S
DOP CALC LVOT STROKE VOLUME: 59.56 CM3
DOP CALCLVOT PEAK VEL VTI: 18.78 CM
E WAVE DECELERATION TIME: 152.43 MSEC
E/A RATIO: 1.27
E/E' RATIO: 12 M/S
ECHO LV POSTERIOR WALL: 0.63 CM (ref 0.6–1.1)
FRACTIONAL SHORTENING: 34 % (ref 28–44)
INTERVENTRICULAR SEPTUM: 0.85 CM (ref 0.6–1.1)
IVRT: 105.61 MSEC
LA MAJOR: 5.34 CM
LA MINOR: 5.68 CM
LA WIDTH: 4.1 CM
LEFT ATRIUM SIZE: 3.92 CM
LEFT ATRIUM VOLUME INDEX: 39.5 ML/M2
LEFT ATRIUM VOLUME: 75.2 CM3
LEFT INTERNAL DIMENSION IN SYSTOLE: 3.41 CM (ref 2.1–4)
LEFT VENTRICLE DIASTOLIC VOLUME INDEX: 66.8 ML/M2
LEFT VENTRICLE DIASTOLIC VOLUME: 127.15 ML
LEFT VENTRICLE MASS INDEX: 68 G/M2
LEFT VENTRICLE SYSTOLIC VOLUME INDEX: 25.2 ML/M2
LEFT VENTRICLE SYSTOLIC VOLUME: 47.92 ML
LEFT VENTRICULAR INTERNAL DIMENSION IN DIASTOLE: 5.16 CM (ref 3.5–6)
LEFT VENTRICULAR MASS: 129.87 G
LV LATERAL E/E' RATIO: 11.25 M/S
LV SEPTAL E/E' RATIO: 12.86 M/S
MV PEAK A VEL: 0.71 M/S
MV PEAK E VEL: 0.9 M/S
MV STENOSIS PRESSURE HALF TIME: 44.2 MS
MV VALVE AREA P 1/2 METHOD: 4.98 CM2
OHS CV CPX 1 MINUTE RECOVERY HEART RATE: 136 BPM
OHS CV CPX 85 PERCENT MAX PREDICTED HEART RATE MALE: 128
OHS CV CPX MAX PREDICTED HEART RATE: 151
OHS CV CPX PATIENT IS FEMALE: 1
OHS CV CPX PATIENT IS MALE: 0
OHS CV CPX PEAK DIASTOLIC BLOOD PRESSURE: 55 MMHG
OHS CV CPX PEAK HEAR RATE: 136 BPM
OHS CV CPX PEAK RATE PRESSURE PRODUCT: NORMAL
OHS CV CPX PEAK SYSTOLIC BLOOD PRESSURE: 166 MMHG
OHS CV CPX PERCENT MAX PREDICTED HEART RATE ACHIEVED: 90
OHS CV CPX RATE PRESSURE PRODUCT PRESENTING: NORMAL
PISA TR MAX VEL: 3.04 M/S
PULM VEIN S/D RATIO: 1
PV PEAK D VEL: 0.38 M/S
PV PEAK S VEL: 0.38 M/S
RA MAJOR: 5.29 CM
RA PRESSURE: 3 MMHG
RA WIDTH: 3.67 CM
RIGHT VENTRICULAR END-DIASTOLIC DIMENSION: 3.63 CM
RV TISSUE DOPPLER FREE WALL SYSTOLIC VELOCITY 1 (APICAL 4 CHAMBER VIEW): 9.17 CM/S
SINUS: 3.31 CM
STJ: 2.42 CM
SYSTOLIC BLOOD PRESSURE: 144 MMHG
TDI LATERAL: 0.08 M/S
TDI SEPTAL: 0.07 M/S
TDI: 0.08 M/S
TR MAX PG: 37 MMHG
TRICUSPID ANNULAR PLANE SYSTOLIC EXCURSION: 1.95 CM
TV REST PULMONARY ARTERY PRESSURE: 40 MMHG

## 2020-10-16 ENCOUNTER — RESEARCH ENCOUNTER (OUTPATIENT)
Dept: RESEARCH | Facility: HOSPITAL | Age: 65
End: 2020-10-16

## 2020-10-20 ENCOUNTER — PATIENT OUTREACH (OUTPATIENT)
Dept: ADMINISTRATIVE | Facility: HOSPITAL | Age: 65
End: 2020-10-20

## 2020-10-21 ENCOUNTER — LAB VISIT (OUTPATIENT)
Dept: FAMILY MEDICINE | Facility: CLINIC | Age: 65
DRG: 167 | End: 2020-10-21
Payer: MEDICARE

## 2020-10-21 ENCOUNTER — PATIENT MESSAGE (OUTPATIENT)
Dept: SURGERY | Facility: HOSPITAL | Age: 65
End: 2020-10-21

## 2020-10-21 DIAGNOSIS — R06.02 SHORTNESS OF BREATH: ICD-10-CM

## 2020-10-21 PROCEDURE — U0003 INFECTIOUS AGENT DETECTION BY NUCLEIC ACID (DNA OR RNA); SEVERE ACUTE RESPIRATORY SYNDROME CORONAVIRUS 2 (SARS-COV-2) (CORONAVIRUS DISEASE [COVID-19]), AMPLIFIED PROBE TECHNIQUE, MAKING USE OF HIGH THROUGHPUT TECHNOLOGIES AS DESCRIBED BY CMS-2020-01-R: HCPCS | Mod: HCNC

## 2020-10-22 ENCOUNTER — PATIENT MESSAGE (OUTPATIENT)
Dept: SURGERY | Facility: HOSPITAL | Age: 65
End: 2020-10-22

## 2020-10-22 ENCOUNTER — ANESTHESIA EVENT (OUTPATIENT)
Dept: SURGERY | Facility: HOSPITAL | Age: 65
DRG: 167 | End: 2020-10-22
Payer: MEDICARE

## 2020-10-22 ENCOUNTER — DOCUMENTATION ONLY (OUTPATIENT)
Dept: CARDIOLOGY | Facility: HOSPITAL | Age: 65
End: 2020-10-22

## 2020-10-22 LAB — SARS-COV-2 RNA RESP QL NAA+PROBE: NOT DETECTED

## 2020-10-22 NOTE — PROGRESS NOTES
Patient has been identified as having an implanted cardiac rhythm device (CRD); the implanted device is a  MDT defibrillator/ BIV    No noted pacer dependency.        Going for Rt lower lobectomy.  ICD is to Left chest            DEFIBRILLATORS/NON-DEPENDENT ANY LOCATION    Per protocol,a magnet should be applied directly over the implanted device during entire surgical procedure when electrocautery will be used.    If no electrocautery is planned, magnet application is not needed.        For additional questions, please contact the Arrhythmia Department at Ext [phone].        At 1400 Received phone call that Anesthesia would like pt placed in an Asynchronous mode.  Please call the Cardiology fellow on call if before 6 am or  Arrhythmia dept at [phone] after 6 am for reprogramming

## 2020-10-22 NOTE — PRE-PROCEDURE INSTRUCTIONS
PRE-OP INSTRUCTIONS:Instructed patient to have nothing by mouth past midnight.It is ok to take AM medications with a few sips of water.Patient instructed to shower the night before surgery as well as the morning of surgery with an antibacterial soap like dial or hibiclens from the neck down.Encouraged patient to wear loose fitting, comfortable clothing .Medication instructions for pm prior to and am of surgery reviewed.Instructed patient to avoid taking vitamins,supplements,aspirin or ibuprofen the am of surgery.Patient's questions and concerns addressed.Patient informed of the current visitor policy and advised patient that one visitor may accompany each patient into the hospital and wait (socially distanced) until a member of the medical team provides an update at the conclusion of the procedure.When they enter the hospital both patient and visitor will have their temperature checked.All visitors are asked to arrive with a mask and to keep their mask on throughout the visit. Patient stated an understanding.Each inpatient is allowed 1 visitor at a time per day between the hours of 8am and 6pm.This visitor must remain in the patient's room and not gather in common areas such as waiting rooms or cafeterias.The visitor must be 18 years or older and arrive with a mask and keep the mask on throughout the visit.    Patient has a Medtronic ICD.Jonelle Sims in the [facility] notified at 1125 10/22/2020 .Reprogramming requested per anesthesia---Note placed in chart by cardiology    Patient denies any side effects or issues with anesthesia or sedation.     Patient does not know arrival time.Explained that this information comes from the surgeon's office and if they haven't heard from them by 2 or 3 pm to call the office.Patient stated an understanding.

## 2020-10-22 NOTE — ANESTHESIA PREPROCEDURE EVALUATION
10/22/2020  Hannah Montoya is a 65 y.o., female.    Anesthesia Evaluation    I have reviewed the Patient Summary Reports.    I have reviewed the Nursing Notes. I have reviewed the NPO Status.   I have reviewed the Medications.     Review of Systems  Anesthesia Hx:  No problems with previous Anesthesia  History of prior surgery of interest to airway management or planning: Denies Family Hx of Anesthesia complications.   Denies Personal Hx of Anesthesia complications.   Hematology/Oncology:  Hematology Normal   Oncology Normal     EENT/Dental:EENT/Dental Normal   Cardiovascular:   Exercise tolerance: good Pacemaker (Medtronic BiV CRT; DDD; underlying SR) Hypertension, well controlled CHF hyperlipidemia SINGH ECG has been reviewed. NICM; EF as low as 15-20% but improved to > 50% with CRT therapy   Pulmonary:   COPD, severe Shortness of breath    Renal/:   Chronic Renal Disease, CRI    Hepatic/GI:  Hepatic/GI Normal    Musculoskeletal:  Musculoskeletal Normal    Neurological:  Neurology Normal    Endocrine:   Diabetes, poorly controlled, type 2    Dermatological:  Skin Normal    Psych:  Psychiatric Normal           Physical Exam  General:  Obesity    Airway/Jaw/Neck:  Airway Findings: Mouth Opening: Normal Tongue: Normal  General Airway Assessment: Adult  Mallampati: II  TM Distance: Normal, at least 6 cm        Eyes/Ears/Nose:  EYES/EARS/NOSE FINDINGS: Normal   Dental:  DENTAL FINDINGS: Normal   Chest/Lungs:  Chest/Lungs Findings: Decreased Breath Sounds Bilateral     Heart/Vascular:  Heart Findings: Normal Heart murmur: negative Vascular Findings: Normal    Abdomen:  Abdomen Findings: Normal    Musculoskeletal:  Musculoskeletal Findings: Normal   Skin:  Skin Findings: Normal    Mental Status:  Mental Status Findings: Normal        Anesthesia Plan  Type of Anesthesia, risks & benefits  discussed:  Anesthesia Type:  general  Patient's Preference:   Intra-op Monitoring Plan: arterial line and standard ASA monitors  Intra-op Monitoring Plan Comments:   Post Op Pain Control Plan: per primary service following discharge from PACU, IV/PO Opioids PRN, multimodal analgesia and peripheral nerve block  Post Op Pain Control Plan Comments:   Induction:   IV  Beta Blocker:  Patient is on a Beta-Blocker and has received one dose within the past 24 hours (No further documentation required).       Informed Consent: Patient understands risks and agrees with Anesthesia plan.  Questions answered. Anesthesia consent signed with patient.  ASA Score: 4     Day of Surgery Review of History & Physical:    H&P update referred to the surgeon.         Ready For Surgery From Anesthesia Perspective.

## 2020-10-23 ENCOUNTER — HOSPITAL ENCOUNTER (INPATIENT)
Facility: HOSPITAL | Age: 65
LOS: 1 days | Discharge: HOME | DRG: 167 | End: 2020-10-24
Attending: THORACIC SURGERY (CARDIOTHORACIC VASCULAR SURGERY) | Admitting: THORACIC SURGERY (CARDIOTHORACIC VASCULAR SURGERY)
Payer: MEDICARE

## 2020-10-23 ENCOUNTER — DOCUMENTATION ONLY (OUTPATIENT)
Dept: CARDIOLOGY | Facility: HOSPITAL | Age: 65
End: 2020-10-23

## 2020-10-23 ENCOUNTER — ANESTHESIA (OUTPATIENT)
Dept: SURGERY | Facility: HOSPITAL | Age: 65
DRG: 167 | End: 2020-10-23
Payer: MEDICARE

## 2020-10-23 DIAGNOSIS — C34.91 NSCLC OF RIGHT LUNG: Primary | ICD-10-CM

## 2020-10-23 DIAGNOSIS — I42.8 NICM (NONISCHEMIC CARDIOMYOPATHY): ICD-10-CM

## 2020-10-23 LAB
ABO + RH BLD: NORMAL
BLD GP AB SCN CELLS X3 SERPL QL: NORMAL
POCT GLUCOSE: 142 MG/DL (ref 70–110)
POCT GLUCOSE: 171 MG/DL (ref 70–110)
POCT GLUCOSE: 245 MG/DL (ref 70–110)

## 2020-10-23 PROCEDURE — 88332 PATH CONSLTJ SURG EA ADD BLK: CPT | Mod: HCNC | Performed by: PATHOLOGY

## 2020-10-23 PROCEDURE — 88331 PATH CONSLTJ SURG 1 BLK 1SPC: CPT | Mod: 26,HCNC,, | Performed by: PATHOLOGY

## 2020-10-23 PROCEDURE — 36000711: Mod: HCNC | Performed by: THORACIC SURGERY (CARDIOTHORACIC VASCULAR SURGERY)

## 2020-10-23 PROCEDURE — 86850 RBC ANTIBODY SCREEN: CPT | Mod: HCNC

## 2020-10-23 PROCEDURE — 88305 TISSUE EXAM BY PATHOLOGIST: CPT | Mod: 59,HCNC | Performed by: PATHOLOGY

## 2020-10-23 PROCEDURE — 37000008 HC ANESTHESIA 1ST 15 MINUTES: Mod: HCNC | Performed by: THORACIC SURGERY (CARDIOTHORACIC VASCULAR SURGERY)

## 2020-10-23 PROCEDURE — 88332 PATH CONSLTJ SURG EA ADD BLK: CPT | Mod: 26,HCNC,, | Performed by: PATHOLOGY

## 2020-10-23 PROCEDURE — 64463 PVB THORACIC CONT INFUSION: CPT | Mod: HCNC | Performed by: STUDENT IN AN ORGANIZED HEALTH CARE EDUCATION/TRAINING PROGRAM

## 2020-10-23 PROCEDURE — 99499 UNLISTED E&M SERVICE: CPT | Mod: HCNC,,, | Performed by: PHYSICIAN ASSISTANT

## 2020-10-23 PROCEDURE — 82962 GLUCOSE BLOOD TEST: CPT | Mod: HCNC | Performed by: THORACIC SURGERY (CARDIOTHORACIC VASCULAR SURGERY)

## 2020-10-23 PROCEDURE — 71000015 HC POSTOP RECOV 1ST HR: Mod: HCNC | Performed by: THORACIC SURGERY (CARDIOTHORACIC VASCULAR SURGERY)

## 2020-10-23 PROCEDURE — 63600175 PHARM REV CODE 636 W HCPCS: Mod: HCNC | Performed by: SURGERY

## 2020-10-23 PROCEDURE — 71000033 HC RECOVERY, INTIAL HOUR: Mod: HCNC | Performed by: THORACIC SURGERY (CARDIOTHORACIC VASCULAR SURGERY)

## 2020-10-23 PROCEDURE — C1751 CATH, INF, PER/CENT/MIDLINE: HCPCS | Mod: HCNC | Performed by: ANESTHESIOLOGY

## 2020-10-23 PROCEDURE — 63600175 PHARM REV CODE 636 W HCPCS: Mod: HCNC | Performed by: STUDENT IN AN ORGANIZED HEALTH CARE EDUCATION/TRAINING PROGRAM

## 2020-10-23 PROCEDURE — 94761 N-INVAS EAR/PLS OXIMETRY MLT: CPT | Mod: HCNC

## 2020-10-23 PROCEDURE — 25000003 PHARM REV CODE 250: Mod: HCNC | Performed by: ANESTHESIOLOGY

## 2020-10-23 PROCEDURE — 71000039 HC RECOVERY, EACH ADD'L HOUR: Mod: HCNC | Performed by: THORACIC SURGERY (CARDIOTHORACIC VASCULAR SURGERY)

## 2020-10-23 PROCEDURE — 32674 THORACOSCOPY LYMPH NODE EXC: CPT | Mod: HCNC,,, | Performed by: THORACIC SURGERY (CARDIOTHORACIC VASCULAR SURGERY)

## 2020-10-23 PROCEDURE — 25000242 PHARM REV CODE 250 ALT 637 W/ HCPCS: Mod: HCNC | Performed by: SURGERY

## 2020-10-23 PROCEDURE — 27201037 HC PRESSURE MONITORING SET UP: Mod: HCNC

## 2020-10-23 PROCEDURE — 88305 TISSUE EXAM BY PATHOLOGIST: ICD-10-PCS | Mod: 26,HCNC,, | Performed by: PATHOLOGY

## 2020-10-23 PROCEDURE — 27201423 OPTIME MED/SURG SUP & DEVICES STERILE SUPPLY: Mod: HCNC | Performed by: THORACIC SURGERY (CARDIOTHORACIC VASCULAR SURGERY)

## 2020-10-23 PROCEDURE — 63600175 PHARM REV CODE 636 W HCPCS: Mod: HCNC | Performed by: ANESTHESIOLOGY

## 2020-10-23 PROCEDURE — 25000003 PHARM REV CODE 250: Mod: HCNC | Performed by: SURGERY

## 2020-10-23 PROCEDURE — 37000009 HC ANESTHESIA EA ADD 15 MINS: Mod: HCNC | Performed by: THORACIC SURGERY (CARDIOTHORACIC VASCULAR SURGERY)

## 2020-10-23 PROCEDURE — 88332 PR  PATH CONSULT IN SURG,W ADDN FRZ SEC: ICD-10-PCS | Mod: 26,HCNC,, | Performed by: PATHOLOGY

## 2020-10-23 PROCEDURE — 99499 NO LOS: ICD-10-PCS | Mod: HCNC,,, | Performed by: PHYSICIAN ASSISTANT

## 2020-10-23 PROCEDURE — C1729 CATH, DRAINAGE: HCPCS | Mod: HCNC | Performed by: THORACIC SURGERY (CARDIOTHORACIC VASCULAR SURGERY)

## 2020-10-23 PROCEDURE — D9220A PRA ANESTHESIA: Mod: HCNC,,, | Performed by: ANESTHESIOLOGY

## 2020-10-23 PROCEDURE — D9220A PRA ANESTHESIA: ICD-10-PCS | Mod: HCNC,,, | Performed by: ANESTHESIOLOGY

## 2020-10-23 PROCEDURE — 32601 THORACOSCOPY DIAGNOSTIC: CPT | Mod: HCNC,,, | Performed by: THORACIC SURGERY (CARDIOTHORACIC VASCULAR SURGERY)

## 2020-10-23 PROCEDURE — 63600175 PHARM REV CODE 636 W HCPCS: Mod: HCNC | Performed by: PHYSICIAN ASSISTANT

## 2020-10-23 PROCEDURE — 27000221 HC OXYGEN, UP TO 24 HOURS: Mod: HCNC

## 2020-10-23 PROCEDURE — 32674 PR THORACOSCOPY LYMPH NODE EXC: ICD-10-PCS | Mod: HCNC,,, | Performed by: THORACIC SURGERY (CARDIOTHORACIC VASCULAR SURGERY)

## 2020-10-23 PROCEDURE — 88331 PATH CONSLTJ SURG 1 BLK 1SPC: CPT | Mod: HCNC | Performed by: PATHOLOGY

## 2020-10-23 PROCEDURE — 36620 INSERTION CATHETER ARTERY: CPT | Mod: 59,HCNC,, | Performed by: ANESTHESIOLOGY

## 2020-10-23 PROCEDURE — 94799 UNLISTED PULMONARY SVC/PX: CPT | Mod: HCNC

## 2020-10-23 PROCEDURE — 36000710: Mod: HCNC | Performed by: THORACIC SURGERY (CARDIOTHORACIC VASCULAR SURGERY)

## 2020-10-23 PROCEDURE — S0020 INJECTION, BUPIVICAINE HYDRO: HCPCS | Mod: HCNC | Performed by: STUDENT IN AN ORGANIZED HEALTH CARE EDUCATION/TRAINING PROGRAM

## 2020-10-23 PROCEDURE — 20600001 HC STEP DOWN PRIVATE ROOM: Mod: HCNC

## 2020-10-23 PROCEDURE — 94640 AIRWAY INHALATION TREATMENT: CPT | Mod: HCNC

## 2020-10-23 PROCEDURE — 76942 ECHO GUIDE FOR BIOPSY: CPT | Mod: HCNC | Performed by: STUDENT IN AN ORGANIZED HEALTH CARE EDUCATION/TRAINING PROGRAM

## 2020-10-23 PROCEDURE — 71000016 HC POSTOP RECOV ADDL HR: Mod: HCNC | Performed by: THORACIC SURGERY (CARDIOTHORACIC VASCULAR SURGERY)

## 2020-10-23 PROCEDURE — 88305 TISSUE EXAM BY PATHOLOGIST: CPT | Mod: 26,HCNC,, | Performed by: PATHOLOGY

## 2020-10-23 PROCEDURE — 88331 PR  PATH CONSULT IN SURG,W FRZ SEC: ICD-10-PCS | Mod: 26,HCNC,, | Performed by: PATHOLOGY

## 2020-10-23 PROCEDURE — 32601 PR THORACOSCOPY,DX NO BX: ICD-10-PCS | Mod: HCNC,,, | Performed by: THORACIC SURGERY (CARDIOTHORACIC VASCULAR SURGERY)

## 2020-10-23 PROCEDURE — 64463 ERECTOR SPINAE PLANE CONTINUOUS CATHETER: ICD-10-PCS | Mod: 59,HCNC,RT, | Performed by: ANESTHESIOLOGY

## 2020-10-23 PROCEDURE — 36620 ARTERIAL: ICD-10-PCS | Mod: 59,HCNC,, | Performed by: ANESTHESIOLOGY

## 2020-10-23 PROCEDURE — 64463 PVB THORACIC CONT INFUSION: CPT | Mod: 59,HCNC,RT, | Performed by: ANESTHESIOLOGY

## 2020-10-23 PROCEDURE — 25000003 PHARM REV CODE 250: Mod: HCNC | Performed by: STUDENT IN AN ORGANIZED HEALTH CARE EDUCATION/TRAINING PROGRAM

## 2020-10-23 RX ORDER — POLYETHYLENE GLYCOL 3350 17 G/17G
17 POWDER, FOR SOLUTION ORAL DAILY
Status: DISCONTINUED | OUTPATIENT
Start: 2020-10-23 | End: 2020-10-24 | Stop reason: HOSPADM

## 2020-10-23 RX ORDER — BUPIVACAINE HYDROCHLORIDE 7.5 MG/ML
INJECTION, SOLUTION EPIDURAL; RETROBULBAR
Status: DISCONTINUED | OUTPATIENT
Start: 2020-10-23 | End: 2020-10-23

## 2020-10-23 RX ORDER — ENOXAPARIN SODIUM 100 MG/ML
40 INJECTION SUBCUTANEOUS EVERY 24 HOURS
Status: DISCONTINUED | OUTPATIENT
Start: 2020-10-23 | End: 2020-10-24 | Stop reason: HOSPADM

## 2020-10-23 RX ORDER — DEXTROSE MONOHYDRATE, SODIUM CHLORIDE, AND POTASSIUM CHLORIDE 50; 1.49; 9 G/1000ML; G/1000ML; G/1000ML
INJECTION, SOLUTION INTRAVENOUS CONTINUOUS
Status: DISCONTINUED | OUTPATIENT
Start: 2020-10-23 | End: 2020-10-23

## 2020-10-23 RX ORDER — METHOCARBAMOL 500 MG/1
500 TABLET, FILM COATED ORAL 3 TIMES DAILY
Status: DISCONTINUED | OUTPATIENT
Start: 2020-10-23 | End: 2020-10-24 | Stop reason: HOSPADM

## 2020-10-23 RX ORDER — ACETAMINOPHEN 500 MG
1000 TABLET ORAL
Status: COMPLETED | OUTPATIENT
Start: 2020-10-23 | End: 2020-10-23

## 2020-10-23 RX ORDER — FAMOTIDINE 20 MG/1
20 TABLET, FILM COATED ORAL 2 TIMES DAILY
Status: DISCONTINUED | OUTPATIENT
Start: 2020-10-23 | End: 2020-10-24 | Stop reason: HOSPADM

## 2020-10-23 RX ORDER — OXYCODONE HYDROCHLORIDE 10 MG/1
10 TABLET ORAL
Status: DISCONTINUED | OUTPATIENT
Start: 2020-10-23 | End: 2020-10-24 | Stop reason: HOSPADM

## 2020-10-23 RX ORDER — ROPIVACAINE HYDROCHLORIDE 2 MG/ML
2 INJECTION, SOLUTION EPIDURAL; INFILTRATION; PERINEURAL CONTINUOUS
Status: DISCONTINUED | OUTPATIENT
Start: 2020-10-23 | End: 2020-10-24

## 2020-10-23 RX ORDER — PREGABALIN 75 MG/1
75 CAPSULE ORAL NIGHTLY
Status: DISCONTINUED | OUTPATIENT
Start: 2020-10-23 | End: 2020-10-24 | Stop reason: HOSPADM

## 2020-10-23 RX ORDER — SODIUM CHLORIDE 9 MG/ML
INJECTION, SOLUTION INTRAVENOUS CONTINUOUS PRN
Status: DISCONTINUED | OUTPATIENT
Start: 2020-10-23 | End: 2020-10-23

## 2020-10-23 RX ORDER — ATORVASTATIN CALCIUM 10 MG/1
10 TABLET, FILM COATED ORAL NIGHTLY
Status: DISCONTINUED | OUTPATIENT
Start: 2020-10-23 | End: 2020-10-24 | Stop reason: HOSPADM

## 2020-10-23 RX ORDER — OXYCODONE HYDROCHLORIDE 5 MG/1
5 TABLET ORAL
Status: DISCONTINUED | OUTPATIENT
Start: 2020-10-23 | End: 2020-10-24 | Stop reason: HOSPADM

## 2020-10-23 RX ORDER — HYDROMORPHONE HYDROCHLORIDE 2 MG/ML
INJECTION, SOLUTION INTRAMUSCULAR; INTRAVENOUS; SUBCUTANEOUS
Status: DISCONTINUED | OUTPATIENT
Start: 2020-10-23 | End: 2020-10-23

## 2020-10-23 RX ORDER — PREGABALIN 75 MG/1
75 CAPSULE ORAL
Status: COMPLETED | OUTPATIENT
Start: 2020-10-23 | End: 2020-10-23

## 2020-10-23 RX ORDER — AMOXICILLIN 250 MG
1 CAPSULE ORAL 2 TIMES DAILY
Status: DISCONTINUED | OUTPATIENT
Start: 2020-10-23 | End: 2020-10-24 | Stop reason: HOSPADM

## 2020-10-23 RX ORDER — ROCURONIUM BROMIDE 10 MG/ML
INJECTION, SOLUTION INTRAVENOUS
Status: DISCONTINUED | OUTPATIENT
Start: 2020-10-23 | End: 2020-10-23

## 2020-10-23 RX ORDER — POLYETHYLENE GLYCOL 3350 17 G/17G
17 POWDER, FOR SOLUTION ORAL DAILY
Status: DISCONTINUED | OUTPATIENT
Start: 2020-10-23 | End: 2020-10-23

## 2020-10-23 RX ORDER — ACETAMINOPHEN 500 MG
1000 TABLET ORAL EVERY 6 HOURS
Status: DISCONTINUED | OUTPATIENT
Start: 2020-10-23 | End: 2020-10-24 | Stop reason: HOSPADM

## 2020-10-23 RX ORDER — MIDAZOLAM HYDROCHLORIDE 1 MG/ML
0.5 INJECTION INTRAMUSCULAR; INTRAVENOUS
Status: DISCONTINUED | OUTPATIENT
Start: 2020-10-23 | End: 2020-10-24 | Stop reason: HOSPADM

## 2020-10-23 RX ORDER — BUPROPION HYDROCHLORIDE 150 MG/1
150 TABLET ORAL DAILY
Status: DISCONTINUED | OUTPATIENT
Start: 2020-10-24 | End: 2020-10-24 | Stop reason: HOSPADM

## 2020-10-23 RX ORDER — SERTRALINE HYDROCHLORIDE 100 MG/1
100 TABLET, FILM COATED ORAL NIGHTLY
Status: DISCONTINUED | OUTPATIENT
Start: 2020-10-23 | End: 2020-10-24 | Stop reason: HOSPADM

## 2020-10-23 RX ORDER — LEVALBUTEROL INHALATION SOLUTION 0.63 MG/3ML
0.63 SOLUTION RESPIRATORY (INHALATION) EVERY 8 HOURS
Status: DISCONTINUED | OUTPATIENT
Start: 2020-10-23 | End: 2020-10-24 | Stop reason: HOSPADM

## 2020-10-23 RX ORDER — ONDANSETRON 8 MG/1
8 TABLET, ORALLY DISINTEGRATING ORAL EVERY 8 HOURS PRN
Status: DISCONTINUED | OUTPATIENT
Start: 2020-10-23 | End: 2020-10-24 | Stop reason: HOSPADM

## 2020-10-23 RX ORDER — CEFAZOLIN SODIUM 1 G/3ML
2 INJECTION, POWDER, FOR SOLUTION INTRAMUSCULAR; INTRAVENOUS
Status: COMPLETED | OUTPATIENT
Start: 2020-10-23 | End: 2020-10-23

## 2020-10-23 RX ORDER — BISACODYL 10 MG
10 SUPPOSITORY, RECTAL RECTAL DAILY PRN
Status: DISCONTINUED | OUTPATIENT
Start: 2020-10-23 | End: 2020-10-24 | Stop reason: HOSPADM

## 2020-10-23 RX ORDER — ONDANSETRON 2 MG/ML
INJECTION INTRAMUSCULAR; INTRAVENOUS
Status: DISCONTINUED | OUTPATIENT
Start: 2020-10-23 | End: 2020-10-23

## 2020-10-23 RX ORDER — CEFAZOLIN SODIUM 1 G/3ML
2 INJECTION, POWDER, FOR SOLUTION INTRAMUSCULAR; INTRAVENOUS
Status: COMPLETED | OUTPATIENT
Start: 2020-10-23 | End: 2020-10-24

## 2020-10-23 RX ORDER — INSULIN ASPART 100 [IU]/ML
0-5 INJECTION, SOLUTION INTRAVENOUS; SUBCUTANEOUS
Status: DISCONTINUED | OUTPATIENT
Start: 2020-10-23 | End: 2020-10-24 | Stop reason: HOSPADM

## 2020-10-23 RX ORDER — GLUCAGON 1 MG
1 KIT INJECTION
Status: DISCONTINUED | OUTPATIENT
Start: 2020-10-23 | End: 2020-10-24 | Stop reason: HOSPADM

## 2020-10-23 RX ORDER — KETAMINE HCL IN 0.9 % NACL 50 MG/5 ML
SYRINGE (ML) INTRAVENOUS
Status: DISCONTINUED | OUTPATIENT
Start: 2020-10-23 | End: 2020-10-23

## 2020-10-23 RX ORDER — ACETAMINOPHEN 10 MG/ML
1000 INJECTION, SOLUTION INTRAVENOUS ONCE
Status: COMPLETED | OUTPATIENT
Start: 2020-10-23 | End: 2020-10-23

## 2020-10-23 RX ORDER — DEXAMETHASONE SODIUM PHOSPHATE 4 MG/ML
INJECTION, SOLUTION INTRA-ARTICULAR; INTRALESIONAL; INTRAMUSCULAR; INTRAVENOUS; SOFT TISSUE
Status: DISCONTINUED | OUTPATIENT
Start: 2020-10-23 | End: 2020-10-23

## 2020-10-23 RX ORDER — MUPIROCIN 20 MG/G
1 OINTMENT TOPICAL 2 TIMES DAILY
Status: DISCONTINUED | OUTPATIENT
Start: 2020-10-23 | End: 2020-10-24 | Stop reason: HOSPADM

## 2020-10-23 RX ORDER — IBUPROFEN 200 MG
16 TABLET ORAL
Status: DISCONTINUED | OUTPATIENT
Start: 2020-10-23 | End: 2020-10-24 | Stop reason: HOSPADM

## 2020-10-23 RX ORDER — PROPOFOL 10 MG/ML
VIAL (ML) INTRAVENOUS
Status: DISCONTINUED | OUTPATIENT
Start: 2020-10-23 | End: 2020-10-23

## 2020-10-23 RX ORDER — METOCLOPRAMIDE HYDROCHLORIDE 5 MG/ML
5 INJECTION INTRAMUSCULAR; INTRAVENOUS EVERY 6 HOURS PRN
Status: DISCONTINUED | OUTPATIENT
Start: 2020-10-23 | End: 2020-10-24 | Stop reason: HOSPADM

## 2020-10-23 RX ORDER — HYDROMORPHONE HYDROCHLORIDE 1 MG/ML
INJECTION, SOLUTION INTRAMUSCULAR; INTRAVENOUS; SUBCUTANEOUS
Status: DISPENSED
Start: 2020-10-23 | End: 2020-10-23

## 2020-10-23 RX ORDER — LIDOCAINE HYDROCHLORIDE 20 MG/ML
INJECTION INTRAVENOUS
Status: DISCONTINUED | OUTPATIENT
Start: 2020-10-23 | End: 2020-10-23

## 2020-10-23 RX ORDER — IBUPROFEN 200 MG
24 TABLET ORAL
Status: DISCONTINUED | OUTPATIENT
Start: 2020-10-23 | End: 2020-10-24 | Stop reason: HOSPADM

## 2020-10-23 RX ORDER — FENTANYL CITRATE 50 UG/ML
INJECTION, SOLUTION INTRAMUSCULAR; INTRAVENOUS
Status: DISCONTINUED | OUTPATIENT
Start: 2020-10-23 | End: 2020-10-23

## 2020-10-23 RX ORDER — FENTANYL CITRATE 50 UG/ML
25 INJECTION, SOLUTION INTRAMUSCULAR; INTRAVENOUS EVERY 5 MIN PRN
Status: DISCONTINUED | OUTPATIENT
Start: 2020-10-23 | End: 2020-10-23

## 2020-10-23 RX ORDER — CARVEDILOL 25 MG/1
25 TABLET ORAL 2 TIMES DAILY
Status: DISCONTINUED | OUTPATIENT
Start: 2020-10-23 | End: 2020-10-24 | Stop reason: HOSPADM

## 2020-10-23 RX ORDER — NEOSTIGMINE METHYLSULFATE 0.5 MG/ML
INJECTION, SOLUTION INTRAVENOUS
Status: DISCONTINUED | OUTPATIENT
Start: 2020-10-23 | End: 2020-10-23

## 2020-10-23 RX ADMIN — DEXAMETHASONE SODIUM PHOSPHATE 4 MG: 4 INJECTION, SOLUTION INTRAMUSCULAR; INTRAVENOUS at 07:10

## 2020-10-23 RX ADMIN — CEFAZOLIN 2 G: 1 INJECTION, POWDER, FOR SOLUTION INTRAMUSCULAR; INTRAVENOUS at 10:10

## 2020-10-23 RX ADMIN — ROCURONIUM BROMIDE 50 MG: 10 INJECTION, SOLUTION INTRAVENOUS at 07:10

## 2020-10-23 RX ADMIN — ROPIVACAINE HYDROCHLORIDE 2 ML/HR: 2 INJECTION, SOLUTION EPIDURAL; INFILTRATION at 12:10

## 2020-10-23 RX ADMIN — METHOCARBAMOL TABLETS 500 MG: 500 TABLET, COATED ORAL at 02:10

## 2020-10-23 RX ADMIN — CEFAZOLIN 2 G: 330 INJECTION, POWDER, FOR SOLUTION INTRAMUSCULAR; INTRAVENOUS at 07:10

## 2020-10-23 RX ADMIN — DOCUSATE SODIUM 50MG AND SENNOSIDES 8.6MG 1 TABLET: 8.6; 5 TABLET, FILM COATED ORAL at 08:10

## 2020-10-23 RX ADMIN — SODIUM CHLORIDE: 0.9 INJECTION, SOLUTION INTRAVENOUS at 07:10

## 2020-10-23 RX ADMIN — ENOXAPARIN SODIUM 40 MG: 40 INJECTION SUBCUTANEOUS at 06:10

## 2020-10-23 RX ADMIN — PROPOFOL 100 MG: 10 INJECTION, EMULSION INTRAVENOUS at 07:10

## 2020-10-23 RX ADMIN — ACETAMINOPHEN 1000 MG: 500 TABLET ORAL at 11:10

## 2020-10-23 RX ADMIN — METHOCARBAMOL TABLETS 500 MG: 500 TABLET, COATED ORAL at 08:10

## 2020-10-23 RX ADMIN — Medication 10 MG: at 10:10

## 2020-10-23 RX ADMIN — DOCUSATE SODIUM 50MG AND SENNOSIDES 8.6MG 1 TABLET: 8.6; 5 TABLET, FILM COATED ORAL at 02:10

## 2020-10-23 RX ADMIN — ONDANSETRON 8 MG: 8 TABLET, ORALLY DISINTEGRATING ORAL at 08:10

## 2020-10-23 RX ADMIN — PREGABALIN 75 MG: 75 CAPSULE ORAL at 08:10

## 2020-10-23 RX ADMIN — FENTANYL CITRATE 100 MCG: 50 INJECTION, SOLUTION INTRAMUSCULAR; INTRAVENOUS at 07:10

## 2020-10-23 RX ADMIN — ACETAMINOPHEN 1000 MG: 10 INJECTION, SOLUTION INTRAVENOUS at 02:10

## 2020-10-23 RX ADMIN — MIDAZOLAM 2 MG: 1 INJECTION INTRAMUSCULAR; INTRAVENOUS at 06:10

## 2020-10-23 RX ADMIN — MUPIROCIN 1 G: 20 OINTMENT TOPICAL at 08:10

## 2020-10-23 RX ADMIN — ROCURONIUM BROMIDE 20 MG: 10 INJECTION, SOLUTION INTRAVENOUS at 08:10

## 2020-10-23 RX ADMIN — CEFAZOLIN 2 G: 330 INJECTION, POWDER, FOR SOLUTION INTRAMUSCULAR; INTRAVENOUS at 11:10

## 2020-10-23 RX ADMIN — SODIUM CHLORIDE, SODIUM GLUCONATE, SODIUM ACETATE, POTASSIUM CHLORIDE, MAGNESIUM CHLORIDE, SODIUM PHOSPHATE, DIBASIC, AND POTASSIUM PHOSPHATE: .53; .5; .37; .037; .03; .012; .00082 INJECTION, SOLUTION INTRAVENOUS at 07:10

## 2020-10-23 RX ADMIN — FAMOTIDINE 20 MG: 20 TABLET, FILM COATED ORAL at 08:10

## 2020-10-23 RX ADMIN — HYDROMORPHONE HYDROCHLORIDE 0.4 MG: 2 INJECTION, SOLUTION INTRAMUSCULAR; INTRAVENOUS; SUBCUTANEOUS at 12:10

## 2020-10-23 RX ADMIN — ACETAMINOPHEN 1000 MG: 500 TABLET ORAL at 06:10

## 2020-10-23 RX ADMIN — OXYCODONE HYDROCHLORIDE 10 MG: 10 TABLET ORAL at 02:10

## 2020-10-23 RX ADMIN — BUPIVACAINE HYDROCHLORIDE 112.5 MG: 7.5 INJECTION, SOLUTION EPIDURAL; RETROBULBAR at 07:10

## 2020-10-23 RX ADMIN — LEVALBUTEROL HYDROCHLORIDE 0.63 MG: 0.63 SOLUTION RESPIRATORY (INHALATION) at 03:10

## 2020-10-23 RX ADMIN — ONDANSETRON 4 MG: 2 INJECTION, SOLUTION INTRAMUSCULAR; INTRAVENOUS at 11:10

## 2020-10-23 RX ADMIN — OXYCODONE HYDROCHLORIDE 10 MG: 10 TABLET ORAL at 11:10

## 2020-10-23 RX ADMIN — NEOSTIGMINE METHYLSULFATE 3 MG: 0.5 INJECTION INTRAVENOUS at 12:10

## 2020-10-23 RX ADMIN — OXYCODONE HYDROCHLORIDE 10 MG: 10 TABLET ORAL at 08:10

## 2020-10-23 RX ADMIN — Medication 10 MG: at 09:10

## 2020-10-23 RX ADMIN — PREGABALIN 75 MG: 75 CAPSULE ORAL at 06:10

## 2020-10-23 RX ADMIN — CARVEDILOL 25 MG: 25 TABLET, FILM COATED ORAL at 08:10

## 2020-10-23 RX ADMIN — HYDROMORPHONE HYDROCHLORIDE 0.4 MG: 2 INJECTION, SOLUTION INTRAMUSCULAR; INTRAVENOUS; SUBCUTANEOUS at 09:10

## 2020-10-23 RX ADMIN — HYDROMORPHONE HYDROCHLORIDE 0.2 MG: 2 INJECTION, SOLUTION INTRAMUSCULAR; INTRAVENOUS; SUBCUTANEOUS at 10:10

## 2020-10-23 RX ADMIN — ATORVASTATIN CALCIUM 10 MG: 10 TABLET, FILM COATED ORAL at 08:10

## 2020-10-23 RX ADMIN — ROCURONIUM BROMIDE 20 MG: 10 INJECTION, SOLUTION INTRAVENOUS at 10:10

## 2020-10-23 RX ADMIN — ROCURONIUM BROMIDE 10 MG: 10 INJECTION, SOLUTION INTRAVENOUS at 09:10

## 2020-10-23 RX ADMIN — FENTANYL CITRATE 50 MCG: 50 INJECTION INTRAMUSCULAR; INTRAVENOUS at 06:10

## 2020-10-23 RX ADMIN — Medication 30 MG: at 07:10

## 2020-10-23 RX ADMIN — LIDOCAINE HYDROCHLORIDE 100 MG: 20 INJECTION, SOLUTION INTRAVENOUS at 07:10

## 2020-10-23 RX ADMIN — SERTRALINE HYDROCHLORIDE 100 MG: 100 TABLET ORAL at 08:10

## 2020-10-23 NOTE — PROGRESS NOTES
Pt has a Medtronic Defibrillator  going to Surgery. R lung lobectomy  Underlying rhythm: SR    No reprogramming to Base Rate or Mode required Ok to keep in DDD mode per Dr. Munoz.     ICD detection and tachy therapies disabled at bedside     Notified pt's nurse that reprogramming was performed.     Post op: Please call [phone] or Cardiology Fellow On Call after hours to have initial settings restored if reprogramming was performed.

## 2020-10-23 NOTE — ANESTHESIA POSTPROCEDURE EVALUATION
Anesthesia Post Evaluation    Patient: Hannah Montoya    Procedure(s) Performed: Procedure(s) (LRB):  XI ROBOTIC LYMPHADENECTOMY (Right)    Final Anesthesia Type: general    Patient location during evaluation: PACU  Patient participation: Yes- Able to Participate  Level of consciousness: awake and responds to stimulation  Post-procedure vital signs: reviewed and stable  Pain management: adequate  Airway patency: patent  KALLIE mitigation strategies: Multimodal analgesia, Extubation while patient is awake, Verification of full reversal of neuromuscular block, Extubation and recovery carried out in lateral, semiupright, or other nonsupine position and Use of major conduction anesthesia (spinal/epidural) or peripheral nerve block  PONV status at discharge: No PONV  Anesthetic complications: no      Cardiovascular status: blood pressure returned to baseline  Respiratory status: unassisted, spontaneous ventilation and face mask  Hydration status: euvolemic  Follow-up not needed.          Vitals Value Taken Time   /58 10/23/20 1243   Temp 36.5 °C (97.7 °F) 10/23/20 1241   Pulse 81 10/23/20 1245   Resp 26 10/23/20 1245   SpO2 98 % 10/23/20 1245   Vitals shown include unvalidated device data.      No case tracking events are documented in the log.      Pain/James Score: Pain Rating Prior to Med Admin: 0 (10/23/2020  6:50 AM)  James Score: 9 (10/23/2020  7:10 AM)

## 2020-10-23 NOTE — ANESTHESIA PROCEDURE NOTES
Erector Spinae Plane Continuous Catheter    Patient location during procedure: pre-op   Block not for primary anesthetic.  Reason for block: at surgeon's request and post-op pain management   Post-op Pain Location: Right chest wall pain  Start time: 10/23/2020 6:51 AM  Timeout: 10/23/2020 6:50 AM   End time: 10/23/2020 7:05 AM    Staffing  Authorizing Provider: Yehuda Monaco MD  Performing Provider: Fazal Contreras MD    Preanesthetic Checklist  Completed: patient identified, site marked, surgical consent, pre-op evaluation, timeout performed, IV checked, risks and benefits discussed and monitors and equipment checked  Peripheral Block  Patient position: sitting  Prep: ChloraPrep  Patient monitoring: heart rate, cardiac monitor, continuous pulse ox, continuous capnometry and frequent blood pressure checks  Block type: erector spinae plane (Erector Spinae Plane)  Laterality: right  Injection technique: continuous  Location: T3-4  Needle  Needle type: Tuohy   Needle gauge: 17 G  Needle length: 3.5 in  Needle localization: anatomical landmarks and ultrasound guidance  Catheter type: spring wound  Catheter size: 19 G  Test dose: lidocaine 1.5% with Epi 1-to-200,000 and negative   -ultrasound image captured on disc.  Assessment  Injection assessment: negative aspiration, negative parasthesia and local visualized surrounding nerve  Paresthesia pain: none  Heart rate change: no  Slow fractionated injection: yes  Additional Notes  Patient tolerated very well.  Vital signs stable.  [facility]  30 mL 0.375% bupivacaine with epinephrine injected under ultrasound guidance.

## 2020-10-23 NOTE — ANESTHESIA PROCEDURE NOTES
Arterial    Diagnosis: lung mass    Patient location during procedure: done in OR  Procedure start time: 10/23/2020 7:35 AM  Timeout: 10/23/2020 7:35 AM  Procedure end time: 10/23/2020 7:40 AM    Staffing  Authorizing Provider: Bryson Munoz MD  Performing Provider: Bryson Munoz MD    Anesthesiologist was present at the time of the procedure.    Preanesthetic Checklist  Completed: patient identified, site marked, surgical consent, pre-op evaluation, timeout performed, IV checked, risks and benefits discussed, monitors and equipment checked and anesthesia consent givenArterial  Skin Prep: chlorhexidine gluconate  Local Infiltration: none  Orientation: right  Location: ulnar  Catheter Size: 20 G  Catheter placement by Anatomical landmarks. Heme positive aspiration all ports.Insertion Attempts: 2  Assessment  Dressing: secured with tape and tegaderm  Patient: Tolerated well

## 2020-10-23 NOTE — INTERVAL H&P NOTE
The patient has been examined and the H&P has been reviewed:    I concur with the findings and no changes have occurred since H&P was written.  To OR for RATS right lower lobectomy and MLND, possible thoracotomy.     Surgery risks, benefits and alternative options discussed and understood by patient/family.          Active Hospital Problems    Diagnosis  POA    NSCLC of right lung [C34.91]  Yes      Resolved Hospital Problems   No resolved problems to display.

## 2020-10-23 NOTE — PROGRESS NOTES
Pt out of the Surgery.    Cardiac device reprogramming  for AICD        ICD detection and tachy therapies enabled at bedside       Pt's nurse notified of changes. op

## 2020-10-23 NOTE — TRANSFER OF CARE
"Anesthesia Transfer of Care Note    Patient: Hannah Montoya    Procedure(s) Performed: Procedure(s) (LRB):  XI ROBOTIC LYMPHADENECTOMY (Right)    Patient location: PACU    Anesthesia Type: general    Transport from OR: Transported from OR on 6-10 L/min O2 by face mask with adequate spontaneous ventilation    Post pain: adequate analgesia    Post assessment: no apparent anesthetic complications    Post vital signs: stable    Level of consciousness: awake and responds to stimulation    Nausea/Vomiting: no nausea/vomiting    Complications: none    Transfer of care protocol was followed      Last vitals:   Visit Vitals  BP (!) 121/58 (BP Location: Right arm, Patient Position: Lying)   Pulse 80   Temp 36.5 °C (97.7 °F) (Temporal)   Resp 18   Ht 5' 2" (1.575 m)   Wt 90.7 kg (200 lb)   LMP  (LMP Unknown)   SpO2 98%   Breastfeeding No   BMI 36.58 kg/m²     "

## 2020-10-23 NOTE — BRIEF OP NOTE
Certification of Assistant at Surgery       Surgery Date: 10/23/2020     Participating Surgeons:  Surgeon(s) and Role:     * Ramo Tucker MD - Primary     * Danielle Song PA-C - Resident - Assisting     * Anthony Gonzalez MD - Resident - Assisting    Procedures:  Procedure(s) (LRB):  XI ROBOTIC LYMPHADENECTOMY (Right)    Assistant Surgeon's Certification of Necessity:  I understand that section 1842 (b) (6) (d) of the Social Security Act generally prohibits Medicare Part B reasonable charge payment for the services of assistants at surgery in teaching hospitals when qualified residents are available to furnish such services. I certify that the services for which payment is claimed were medically necessary, and that no qualified resident was available to perform the services. I further understand that these services are subject to post-payment review by the Medicare carrier.      Danielle Song PA-C    10/23/2020  4:54 PM

## 2020-10-23 NOTE — ANESTHESIA PROCEDURE NOTES
Intubation  Performed by: Bryson Munoz MD  Authorized by: Bryson Munoz MD     Intubation:     Induction:  Intravenous    Intubated:  Postinduction    Mask Ventilation:  Easy mask    Attempts:  1    Attempted By:  Staff anesthesiologist    Blade:  Michele 3    Laryngeal View Grade: Grade I - full view of chords      Difficult Airway Encountered?: No      Complications:  None    Airway Device:  Double lumen tube left    Airway Device Size:  35F    Style/Cuff Inflation:  Cuffed (inflated to minimal occlusive pressure)    Placement Verified By:  Capnometry    Complicating Factors:  None    Findings Post-Intubation:  BS equal bilateral  Notes:      35 Fr Left PEDRO placed. Endobronchial position confirmed with FOB after final patient positioning.

## 2020-10-23 NOTE — BRIEF OP NOTE
[facility]  Surgery Department  Brief Operative Note    SUMMARY     Date of Procedure: 10/23/2020     Procedure: Procedure(s) (LRB):  XI ROBOTIC LYMPHADENECTOMY (Right)     Surgeon(s) and Role:     * Ramo Tucker MD - Primary     * Danielle Song PA-C - Resident - Assisting     * Anthony Gonzalez MD - Resident - Assisting    Pre-Operative Diagnosis: NSCLC of right lung [C34.91]  Lung mass [R91.8]    Post-Operative Diagnosis: Post-Op Diagnosis Codes:     * NSCLC of right lung [C34.91]     * Lung mass [R91.8]    Anesthesia: General    Technical Procedures Used: robot-assisted mediastinal lymph node dissection    Description of the Findings of the Procedure: large, known-positive level 11 node densely adherent to basilar segment and middle lobe branches of pulmonary artery.  Abnormal arterial anatomy with posterior ascending branch arising from distal superior segmental artery.  Bilobectomy plus sacrifice of posterior ascending artery would have been required via thoracotomy, and her PFTs preclude that extent of resection.    Significant Surgical Tasks Conducted by the Assistant(s), if Applicable: bedside assist    Complications: No    Estimated Blood Loss (EBL): 50 mL           Implants:     Specimens:   Levels 2R, 4R, 7 and 11           Condition: Fair    Disposition: PACU - hemodynamically stable.    Attestation: Faculty was present for the procedure.    Anthony Gonzalez MD  Cardiothoracic Surgery Fellow      Ramo Tucker MD

## 2020-10-24 VITALS
SYSTOLIC BLOOD PRESSURE: 125 MMHG | HEART RATE: 79 BPM | RESPIRATION RATE: 18 BRPM | BODY MASS INDEX: 36.86 KG/M2 | WEIGHT: 200.31 LBS | OXYGEN SATURATION: 93 % | TEMPERATURE: 98 F | DIASTOLIC BLOOD PRESSURE: 62 MMHG | HEIGHT: 62 IN

## 2020-10-24 LAB — POCT GLUCOSE: 185 MG/DL (ref 70–110)

## 2020-10-24 PROCEDURE — 99231 SBSQ HOSP IP/OBS SF/LOW 25: CPT | Mod: HCNC,,, | Performed by: ANESTHESIOLOGY

## 2020-10-24 PROCEDURE — 25000242 PHARM REV CODE 250 ALT 637 W/ HCPCS: Mod: HCNC | Performed by: SURGERY

## 2020-10-24 PROCEDURE — 25000003 PHARM REV CODE 250: Mod: HCNC | Performed by: STUDENT IN AN ORGANIZED HEALTH CARE EDUCATION/TRAINING PROGRAM

## 2020-10-24 PROCEDURE — 94640 AIRWAY INHALATION TREATMENT: CPT | Mod: HCNC

## 2020-10-24 PROCEDURE — 99231 PR SUBSEQUENT HOSPITAL CARE,LEVL I: ICD-10-PCS | Mod: HCNC,,, | Performed by: ANESTHESIOLOGY

## 2020-10-24 PROCEDURE — 94760 N-INVAS EAR/PLS OXIMETRY 1: CPT | Mod: HCNC

## 2020-10-24 PROCEDURE — 97161 PT EVAL LOW COMPLEX 20 MIN: CPT | Mod: HCNC

## 2020-10-24 PROCEDURE — 97535 SELF CARE MNGMENT TRAINING: CPT | Mod: HCNC

## 2020-10-24 PROCEDURE — 63600175 PHARM REV CODE 636 W HCPCS: Mod: HCNC | Performed by: SURGERY

## 2020-10-24 PROCEDURE — 97530 THERAPEUTIC ACTIVITIES: CPT | Mod: HCNC

## 2020-10-24 PROCEDURE — 25000003 PHARM REV CODE 250: Mod: HCNC | Performed by: SURGERY

## 2020-10-24 PROCEDURE — 97165 OT EVAL LOW COMPLEX 30 MIN: CPT | Mod: HCNC

## 2020-10-24 RX ORDER — FUROSEMIDE 40 MG/1
40 TABLET ORAL DAILY
Status: DISCONTINUED | OUTPATIENT
Start: 2020-10-24 | End: 2020-10-24 | Stop reason: HOSPADM

## 2020-10-24 RX ORDER — OXYCODONE HYDROCHLORIDE 5 MG/1
5 TABLET ORAL EVERY 4 HOURS PRN
Qty: 30 TABLET | Refills: 0 | Status: SHIPPED | OUTPATIENT
Start: 2020-10-24 | End: 2021-04-01

## 2020-10-24 RX ADMIN — LEVALBUTEROL HYDROCHLORIDE 0.63 MG: 0.63 SOLUTION RESPIRATORY (INHALATION) at 01:10

## 2020-10-24 RX ADMIN — MUPIROCIN 1 G: 20 OINTMENT TOPICAL at 09:10

## 2020-10-24 RX ADMIN — FUROSEMIDE 40 MG: 40 TABLET ORAL at 09:10

## 2020-10-24 RX ADMIN — FAMOTIDINE 20 MG: 20 TABLET, FILM COATED ORAL at 09:10

## 2020-10-24 RX ADMIN — POLYETHYLENE GLYCOL 3350 17 G: 17 POWDER, FOR SOLUTION ORAL at 09:10

## 2020-10-24 RX ADMIN — CEFAZOLIN 2 G: 1 INJECTION, POWDER, FOR SOLUTION INTRAMUSCULAR; INTRAVENOUS at 06:10

## 2020-10-24 RX ADMIN — METHOCARBAMOL TABLETS 500 MG: 500 TABLET, COATED ORAL at 09:10

## 2020-10-24 RX ADMIN — ACETAMINOPHEN 1000 MG: 500 TABLET ORAL at 05:10

## 2020-10-24 RX ADMIN — OXYCODONE HYDROCHLORIDE 10 MG: 10 TABLET ORAL at 02:10

## 2020-10-24 RX ADMIN — DOCUSATE SODIUM 50MG AND SENNOSIDES 8.6MG 1 TABLET: 8.6; 5 TABLET, FILM COATED ORAL at 09:10

## 2020-10-24 RX ADMIN — OXYCODONE HYDROCHLORIDE 10 MG: 10 TABLET ORAL at 05:10

## 2020-10-24 RX ADMIN — LEVALBUTEROL HYDROCHLORIDE 0.63 MG: 0.63 SOLUTION RESPIRATORY (INHALATION) at 09:10

## 2020-10-24 RX ADMIN — BUPROPION HYDROCHLORIDE 150 MG: 150 TABLET, FILM COATED, EXTENDED RELEASE ORAL at 09:10

## 2020-10-24 RX ADMIN — CARVEDILOL 25 MG: 25 TABLET, FILM COATED ORAL at 09:10

## 2020-10-24 NOTE — ADDENDUM NOTE
Addendum  created 10/24/20 1108 by Harlan Avina MD    Clinical Note Signed, Intraprocedure Event edited

## 2020-10-24 NOTE — PT/OT/SLP EVAL
Physical Therapy Evaluation and Discharge Note    Patient Name:  Hannah Montoya   MRN:  [id number]    Recommendations:     Discharge Recommendations:  home   Discharge Equipment Recommendations: none   Barriers to discharge: None    Assessment:     Hannah Montoya is a 65 y.o. female admitted with a medical diagnosis of NSCLC of right lung. At this time, patient is functioning at their prior level of function and does not require further acute PT services.     Recent Surgery: Procedure(s) (LRB):  XI ROBOTIC LYMPHADENECTOMY (Right) 1 Day Post-Op    Plan:     During this hospitalization, patient does not require further acute PT services.  Please re-consult if situation changes.      Subjective     Chief Complaint: none  Patient/Family Comments/goals: to go home  Pain/Comfort:  · Pain Rating 1: 0/10  · Pain Rating Post-Intervention 1: 0/10    Patients cultural, spiritual, Jainism conflicts given the current situation: no    Living Environment:  Pt lives alone but daughter and son-in-law live around th corner from her and can provide assistance as needed  Prior to admission, patients level of function was independent.  Equipment used at home: none(pt has rollator, shower chair).  DME owned (not currently used): shower chair and rollator.  Upon discharge, patient will have assistance from family.    Objective:     Communicated with RN prior to session.  Patient found up in chair with peripheral IV, PCA, telemetry upon PT entry to room.    General Precautions: Standard, fall   Orthopedic Precautions:N/A   Braces: N/A     Exams:  · Cognitive Exam:  Patient is oriented to Person, Place, Time and Situation  · Sensation:    · -       Intact  · RLE ROM: WFL  · RLE Strength: WFL  · LLE ROM: WFL  · LLE Strength: WFL    Functional Mobility:  · Bed Mobility:     · Rolling Right: independence  · Scooting: independence  · Supine to Sit: independence  · Sit to Supine: independence  · Transfers:     · Sit to Stand:   supervision with no AD  · Gait: 40 ft to/from bathroom with supervision and no AD, no LOB  · Balance: Good    AM-PAC 6 CLICK MOBILITY  Total Score:        Therapeutic Activities and Exercises:  - Standing balance x 15 mins to perform self care activities using sink/counter for balance  - Toilet transfer with supervision  - bed <> Chair transfer with supervision  - sit to stands x 2 with supervision  - Dynamic standing balance: picking up item from floor with Doris due to pt unable to flex trunk due to pain      AM-PAC 6 CLICK MOBILITY  Total Score:      Patient left up in chair with all lines intact and call button in reach.    GOALS:   Multidisciplinary Problems     Physical Therapy Goals        Problem: Physical Therapy Goal    Goal Priority Disciplines Outcome Goal Variances Interventions   Physical Therapy Goal     PT, PT/OT                      History:     Past Medical History:   Diagnosis Date    AICD (automatic cardioverter/defibrillator) present     Asthma     bronchitis in past    Breast cancer 2016    right    Cardiac pacemaker     Cardiomyopathy     COPD (chronic obstructive pulmonary disease)     Diabetes mellitus, type 2     Hyperglycemia     Hyperlipidemia     Hypertension     Malignant neoplasm of overlapping sites of female breast 2016    Nuclear sclerosis of both eyes 2020    Respiratory distress 3/12/2020       Past Surgical History:   Procedure Laterality Date    BREAST BIOPSY Right 2016    [facility]    BREAST LUMPECTOMY Right     CARDIAC CATHETERIZATION Bilateral 2017    CARDIAC DEFIBRILLATOR PLACEMENT Left 08/10/2017    CARDIAC DEFIBRILLATOR PLACEMENT Left 2018     SECTION      x2    CHOLECYSTECTOMY      LUNG BIOPSY N/A 2020    Procedure: BIOPSY, LUNG;  Surgeon: Kalpesh Diagnostic Provider;  Location: [facility];  Service: Radiology;  Laterality: N/A;  8AM START  RN PREOP 2020---COVID NEGATIVE    NAVIGATIONAL BRONCHOSCOPY N/A 10/13/2020     Procedure: BRONCHOSCOPY, NAVIGATIONAL;  Surgeon: Jamilah Weaver MD;  [facility];  Service: Pulmonary;  Laterality: N/A;    REVISION OF IMPLANTABLE CARDIOVERTER-DEFIBRILLATOR (ICD) ELECTRODE LEAD PLACEMENT N/A 6/15/2018    Procedure: REVISION-LEAD-ICD;  Surgeon: Javy Gates MD;  Location: [facility] CATH LAB;  Service: Cardiology;  Laterality: N/A;  LBBB, Bi-V ICD HIS Ld rev, MDT, Choice, MB, 3 Prep    TUBAL LIGATION         Time Tracking:     PT Received On: 10/24/20  PT Start Time: 1107     PT Stop Time: 1130  PT Total Time (min): 23 min     Billable Minutes: Evaluation 8 and Therapeutic Activity 15      CARMINA FUENTES, PT  10/24/2020

## 2020-10-24 NOTE — PLAN OF CARE
Plan of care reviewed with pt/verbalized understanding, vss, patient c/o of pain to chest-tube insertion site relieved with po pain medication, ropivicaine drip continous, patient tolerating clear liquid diet, patient up to bathroom with assistance, call-light within reach, will continue to monitor.

## 2020-10-24 NOTE — ANESTHESIA POST-OP PAIN MANAGEMENT
Acute Pain Service Progress Note    Hannah Montoya is a 65 y.o., female, [id number].    Surgery:  XI ROBOTIC LYMPHADENECTOMY (Right )    Post Op Day #: 1    Catheter type: perineural  R CURTIS    Infusion type: Ropivacaine 0.2%  2cc/hr  basal    Problem List:    Active Hospital Problems    Diagnosis  POA    *NSCLC of right lung [C34.91]  Yes    NICM (nonischemic cardiomyopathy) [I42.8]  Yes    Type 2 diabetes mellitus without complication [E11.9]  Yes    COPD (chronic obstructive pulmonary disease) [J44.9]  Yes     fev1 58%.  Controlled on advair, spiriva and duonebs.     Quit smoking 2016      Essential hypertension [I10]  Yes      Resolved Hospital Problems   No resolved problems to display.       Subjective:     General appearance of alert, oriented, no complaints   Pain with rest: 1    Numbers   Pain with movement: 1    Numbers   Side Effects    1. Pruritis No    2. Nausea No    3. Motor Blockade No, 0=Ability to raise lower extremities off bed    4. Sedation No, 1=awake and alert    Objective:        Catheter site clean, dry, intact        Vitals   Vitals:    10/24/20 0949   BP:    Pulse: 75   Resp: 18   Temp:         Labs    Admission on 10/23/2020   Component Date Value Ref Range Status    Group & Rh 10/23/2020 A POS   Final    Indirect Zhen 10/23/2020 NEG   Final    POCT Glucose 10/23/2020 171* 70 - 110 mg/dL Final    POCT Glucose 10/23/2020 245* 70 - 110 mg/dL Final    POCT Glucose 10/23/2020 142* 70 - 110 mg/dL Final        Meds   Current Facility-Administered Medications   Medication Dose Route Frequency Provider Last Rate Last Dose    acetaminophen tablet 1,000 mg  1,000 mg Oral Q6H Anthony Gonzalez MD   1,000 mg at 10/24/20 0549    atorvastatin tablet 10 mg  10 mg Oral QHS Anthony Gonzalez MD   10 mg at 10/23/20 2010    bisacodyL suppository 10 mg  10 mg Rectal Daily PRN Anthony Gonzalez MD        buPROPion TB24 tablet 150 mg  150 mg Oral Daily Anthony Gonzalez MD   150 mg  at 10/24/20 0903    carvediloL tablet 25 mg  25 mg Oral BID Anthony Gonzalez MD   25 mg at 10/24/20 0903    dextrose 50% injection 12.5 g  12.5 g Intravenous PRN Anthony Gonzalez MD        dextrose 50% injection 25 g  25 g Intravenous PRN Anthony Gonzalez MD        enoxaparin injection 40 mg  40 mg Subcutaneous Q24H Anthony Gonzalez MD   40 mg at 10/23/20 1803    famotidine tablet 20 mg  20 mg Oral BID Anthony Gonzalez MD   20 mg at 10/24/20 0903    furosemide tablet 40 mg  40 mg Oral Daily Sav Friedman MD   40 mg at 10/24/20 0903    glucagon (human recombinant) injection 1 mg  1 mg Intramuscular PRN Anthony Gonzalez MD        glucose chewable tablet 16 g  16 g Oral PRN Anthony Gonzalez MD        glucose chewable tablet 24 g  24 g Oral PRN Anthony Gonzalez MD        insulin aspart U-100 pen 0-5 Units  0-5 Units Subcutaneous QID (AC + HS) PRN Anthony Gonzalez MD        levalbuterol nebulizer solution 0.63 mg  0.63 mg Nebulization Q8H Anthony Gonzalez MD   0.63 mg at 10/24/20 0949    methocarbamoL tablet 500 mg  500 mg Oral TID Anthony Gonzalez MD   500 mg at 10/24/20 0903    metoclopramide HCl injection 5 mg  5 mg Intravenous Q6H PRN Anthony Gonzalez MD        midazolam (VERSED) 1 mg/mL injection 0.5 mg  0.5 mg Intravenous PRN Anthony Gonzalez MD   2 mg at 10/23/20 0650    mupirocin 2 % ointment 1 g  1 g Nasal BID Anthony Gonzalez MD   1 g at 10/24/20 0903    ondansetron disintegrating tablet 8 mg  8 mg Oral Q8H PRN Anthony Gonzalez MD   8 mg at 10/23/20 2010    oxyCODONE immediate release tablet 10 mg  10 mg Oral Q3H PRN Anthony Gonzalez MD   10 mg at 10/24/20 0548    oxyCODONE immediate release tablet 5 mg  5 mg Oral Q3H PRN Anthony Gonzalez MD        polyethylene glycol packet 17 g  17 g Oral Daily Anthony Gonzalez MD   17 g at 10/24/20 0902    pregabalin capsule 75 mg  75 mg Oral QHS Anthony Gonzalez MD   75 mg at 10/23/20 2010     senna-docusate 8.6-50 mg per tablet 1 tablet  1 tablet Oral BID Anthony Gonzalez MD   1 tablet at 10/24/20 0903    sertraline tablet 100 mg  100 mg Oral QHS Anthony Gonzalez MD   100 mg at 10/23/20 2010           Assessment:     Pain control adequate    Plan:     Patient doing well, continue present treatment.    Patient reports adequate pain control this am after catheter being paused for 2 hours.    PNC was pulled at 1000 w/ blue tip still intact.    APS will sign off for now.     Harlan Avina MD  PGY3, CA2 Anethesiology   [facility]     10/24/2020

## 2020-10-24 NOTE — ASSESSMENT & PLAN NOTE
65 y.o. female with NSCLC of RLL s/p robotic right lymphadenectomy 10/23    - D/c CURTIS by anesthesia   - Transition to multimodal PO pain meds  - Regular diet   - D/c chest tube   - Home medications  - Ambulate/PT  - D/c home later today vs tomorrow

## 2020-10-24 NOTE — PROGRESS NOTES
[facility]  Thoracic Surgery  Progress Note    Subjective:     History of Present Illness:  No notes on file    Post-Op Info:  Procedure(s) (LRB):  XI ROBOTIC LYMPHADENECTOMY (Right)   1 Day Post-Op     Interval History: Did well overnight  AVFSS on RA  Chest tube output 80cc. No air leak   Pain well controlled even with CURTIS off    Medications:  Continuous Infusions:  Scheduled Meds:   acetaminophen  1,000 mg Oral Q6H    atorvastatin  10 mg Oral QHS    buPROPion  150 mg Oral Daily    carvediloL  25 mg Oral BID    enoxaparin  40 mg Subcutaneous Q24H    famotidine  20 mg Oral BID    furosemide  40 mg Oral Daily    levalbuterol  0.63 mg Nebulization Q8H    methocarbamoL  500 mg Oral TID    mupirocin  1 g Nasal BID    polyethylene glycol  17 g Oral Daily    pregabalin  75 mg Oral QHS    senna-docusate 8.6-50 mg  1 tablet Oral BID    sertraline  100 mg Oral QHS     PRN Meds:bisacodyL, dextrose 50%, dextrose 50%, glucagon (human recombinant), glucose, glucose, insulin aspart U-100, metoclopramide HCl, midazolam, ondansetron, oxyCODONE, oxyCODONE     Review of patient's allergies indicates:   Allergen Reactions    Adhesive Rash     tegaderm burns and blistered skin     Objective:     Vital Signs (Most Recent):  Temp: 97.8 °F (36.6 °C) (10/24/20 0432)  Pulse: 76 (10/24/20 0705)  Resp: 20 (10/24/20 0548)  BP: (!) 140/75 (10/24/20 0432)  SpO2: (!) 94 % (10/24/20 0432) Vital Signs (24h Range):  Temp:  [97 °F (36.1 °C)-98 °F (36.7 °C)] 97.8 °F (36.6 °C)  Pulse:  [73-83] 76  Resp:  [12-20] 20  SpO2:  [92 %-98 %] 94 %  BP: (111-148)/(55-75) 140/75     Intake/Output - Last 3 Shifts       10/22 0700 - 10/23 0659 10/23 0700 - 10/24 0659 10/24 0700 - 10/25 0659    P.O.  480     I.V. (mL/kg)  1000 (11)     Other  0     Total Intake(mL/kg)  1480 (16.3)     Urine (mL/kg/hr)  1590 (0.7)     Emesis/NG output  0     Other  0     Stool  0     Blood  50     Chest Tube  165     Total Output  1805     Net   -325            Stool Occurrence  0 x           SpO2: (!) 94 %  O2 Device (Oxygen Therapy): room air    Physical Exam  Vitals signs and nursing note reviewed.   Constitutional:       Appearance: Normal appearance.   HENT:      Head: Normocephalic.      Mouth/Throat:      Mouth: Mucous membranes are moist.   Eyes:      Extraocular Movements: Extraocular movements intact.      Pupils: Pupils are equal, round, and reactive to light.   Cardiovascular:      Rate and Rhythm: Normal rate and regular rhythm.      Pulses: Normal pulses.   Pulmonary:      Effort: Pulmonary effort is normal.   Abdominal:      General: Abdomen is flat. There is no distension.      Palpations: Abdomen is soft. There is no mass.   Skin:     General: Skin is warm.   Neurological:      General: No focal deficit present.      Mental Status: She is alert and oriented to person, place, and time.   Psychiatric:         Mood and Affect: Mood normal.         Behavior: Behavior normal.         Significant Labs:  ABGs: No results for input(s): PH, PCO2, PO2, HCO3, POCSATURATED, BE in the last 48 hours.  Amylase: No results for input(s): AMYLASE in the last 48 hours.  BMP: No results for input(s): GLU, NA, K, CL, CO2, BUN, CREATININE, CALCIUM, MG in the last 48 hours.  Cardiac markers: No results for input(s): CKMB, CPKMB, TROPONINT, TROPONINI, MYOGLOBIN in the last 48 hours.  CBC: No results for input(s): WBC, RBC, HGB, HCT, PLT, MCV, MCH, MCHC in the last 48 hours.  CMP: No results for input(s): GLU, CALCIUM, ALBUMIN, PROT, NA, K, CO2, CL, BUN, CREATININE, ALKPHOS, ALT, AST, BILITOT in the last 48 hours.    Significant Diagnostics:  I have reviewed all pertinent imaging results/findings within the past 24 hours.    VTE Risk Mitigation (From admission, onward)         Ordered     enoxaparin injection 40 mg  Every 24 hours      10/23/20 1223     IP VTE HIGH RISK PATIENT  Once      10/23/20 1223     Place sequential compression device  Until discontinued       10/23/20 1223              Assessment/Plan:     * NSCLC of right lung  65 y.o. female with NSCLC of RLL s/p robotic right lymphadenectomy 10/23    - D/c CURTIS by anesthesia   - Transition to multimodal PO pain meds  - Regular diet   - D/c chest tube   - Home medications  - Ambulate/PT  - D/c home later today vs tomorrow        Sav Friedman MD  Thoracic Surgery  [facility]

## 2020-10-24 NOTE — PT/OT/SLP EVAL
"Occupational Therapy   Evaluation and Discharge Note    Name: Hannah Montoya  MRN: [id number]  Admitting Diagnosis:  NSCLC of right lung 1 Day Post-Op   Co-eval with PT for more comprehensive pt assessment with 2 disciplines     Recommendations:     Discharge Recommendations: home  Discharge Equipment Recommendations:  none  Barriers to discharge:  None    Assessment:     Hannah Montoya is a 65 y.o. female with a medical diagnosis of NSCLC of right lung. At this time, patient is functioning at their prior level of function and does not require further acute OT services.     Plan:     During this hospitalization, patient does not require further acute OT services.  Please re-consult if situation changes.    · Plan of Care Reviewed with: patient    Subjective   Pt was pleasant and cooperative during assessment.    "I can't wait to get the lines off of me."  Chief Complaint: none  Patient/Family Comments/goals: to go home    Occupational Profile:  Living Environment: Pt lives alone (with her 2 cats) in a H with a threshold to enter. She has a tub/shower combo and a standard toilet.  Pt reports no recent falls.   Previous level of function: IND with ADLs, IADL, and functional mobility. Pt still drives to her doctor appointments, but she has been getting her groceries delivered to her house due to COVID-19 concerns.    Roles and Routines: retired/disabled, mother, grandmother; pt enjoys reading, doing crossword puzzles, sewing, knitting, and crocheting   Equipment Used at home:  other (see comments)(Pt owns 2 rollators, a portable wheelchair, and a shower chair but does not use them (were for her family members).)  Assistance upon Discharge: Her family can assist.  Her daughter and granddaughter (who is 20) live around the corner.  Her daughter works, but with her granddaughter also available,  pt has 24 hour care if needed.  Pt's son-in-law also lives around the corner (pt said is disabled, did not " specify).     Pain/Comfort:  · Pain Rating 1: 0/10  · Pain Rating Post-Intervention 1: 0/10    Patients cultural, spiritual, Christianity conflicts given the current situation: no    Objective:     Communicated with: RN and PT prior to session.  Patient found HOB elevated with peripheral IV, telemetry, PCA upon OT entry to room.    General Precautions: Standard, fall   Orthopedic Precautions:N/A   Braces: N/A     Occupational Performance:    Bed Mobility:    · Patient completed Rolling/Turning to Right with supervision  · Patient completed Scooting/Bridging with supervision  · Patient completed Supine to Sit with supervision    Functional Mobility/Transfers:  · Patient completed Sit <> Stand Transfer from EOB x 2 trials with supervision  with  no assistive device  · Patient completed Bed <> Chair Transfer using Step Transfer technique with supervision with no assistive device  · Patient completed Toilet Transfer Step Transfer technique with supervision using the bathroom sink to transfer on and off the toilet.  Pt has a sink next to her toilet at home as well.  · Functional Mobility: Pt ambulated a functional household distance in her room with Supervision with no AD.  Pt with no LOB.     Activities of Daily Living:  · Grooming: supervision to wash her hands and brush her teeth while standing at bathroom sink  · Lower Body Dressing: minimum assistance to thread her RLE through pajama pant leg.  However, this was most likely due to the pt's non-skid socks causing friction, making donning her pants more difficult than normal.    Cognitive/Visual Perceptual:  Cognitive/Psychosocial Skills:     -       Oriented to: Person, Place, Time and Situation   -       Follows Commands/attention:Follows multistep  commands  -       Communication: clear/fluent  -       Memory: No Deficits noted  -       Safety awareness/insight to disability: intact   -       Mood/Affect/Coping skills/emotional control: Appropriate to  situation    Physical Exam:  Dominant hand:    -       R-handed  Upper Extremity Range of Motion:     -       Right Upper Extremity: WFL  -       Left Upper Extremity: WFL  Upper Extremity Strength:    -       Right Upper Extremity: WFL  -       Left Upper Extremity: WFL   Strength:    -       Right Upper Extremity: WFL  -       Left Upper Extremity: WFL  Fine Motor Coordination:    -       Intact    AMPAC 6 Click ADL:  AMPAC Total Score: 24    Treatment & Education:  - Pt edu on role of OT, POC, safety when performing self care tasks, benefit of performing OOB activity, and safety when performing functional transfers and mobility.  - White board updated  - Self care tasks completed-- as noted above     Education:    Patient left up in chair with all lines intact and call button in reach    GOALS:   Multidisciplinary Problems     Occupational Therapy Goals     Not on file          Multidisciplinary Problems (Resolved)        Problem: Occupational Therapy Goal    Goal Priority Disciplines Outcome Interventions   Occupational Therapy Goal   (Resolved)     OT, PT/OT Met                    History:     Past Medical History:   Diagnosis Date    AICD (automatic cardioverter/defibrillator) present     Asthma     bronchitis in past    Breast cancer 2016    right    Cardiac pacemaker     Cardiomyopathy     COPD (chronic obstructive pulmonary disease)     Diabetes mellitus, type 2     Hyperglycemia     Hyperlipidemia     Hypertension     Malignant neoplasm of overlapping sites of female breast 2016    Nuclear sclerosis of both eyes 2020    Respiratory distress 3/12/2020       Past Surgical History:   Procedure Laterality Date    [facility]AST BIOPSY Right 2016    [facility]    BREAST LUMPECTOMY Right     CARDIAC CATHETERIZATION Bilateral 2017    CARDIAC DEFIBRILLATOR PLACEMENT Left 08/10/2017    CARDIAC DEFIBRILLATOR PLACEMENT Left 2018     SECTION      x2    CHOLECYSTECTOMY       LUNG BIOPSY N/A 5/28/2020    Procedure: BIOPSY, LUNG;  Surgeon: Kalpesh Diagnostic Provider;  Location: [facility] OR;  Service: Radiology;  Laterality: N/A;  8AM START  RN PREOP 5/26/2020---COVID NEGATIVE    NAVIGATIONAL BRONCHOSCOPY N/A 10/13/2020    Procedure: BRONCHOSCOPY, NAVIGATIONAL;  Surgeon: Jamilah Weaver MD;  [address];  Service: Pulmonary;  Laterality: N/A;    REVISION OF IMPLANTABLE CARDIOVERTER-DEFIBRILLATOR (ICD) ELECTRODE LEAD PLACEMENT N/A 6/15/2018    Procedure: REVISION-LEAD-ICD;  Surgeon: Javy Gates MD;  Location: [facility] CATH LAB;  Service: Cardiology;  Laterality: N/A;  LBBB, Bi-V ICD HIS Elmo jordan MDT, Choice, MB, 3 Prep    TUBAL LIGATION         Time Tracking:     OT Date of Treatment: 10/24/20  OT Start Time: 1102  OT Stop Time: 1130  OT Total Time (min): 28 min    Billable Minutes:Evaluation 15 min.  Self Care/Home Management 13 min.    Gokul Holder, OT  10/24/2020

## 2020-10-24 NOTE — NURSING
Pt discharged home. IV in left and right arm removed catheters intact. Pt voided prior to discharge. Pt ambulated to bathroom with out difficulty. Discharge instructions given and follow up appointment discussed. Pt transport requested.

## 2020-10-24 NOTE — PLAN OF CARE
10/24/20 1120   Discharge Assessment   Assessment Type Discharge Planning Assessment   Confirmed/corrected address and phone number on facesheet? Yes   Assessment information obtained from? Patient   Expected Length of Stay (days) 2   Communicated expected length of stay with patient/caregiver yes   Prior to hospitilization cognitive status: Alert/Oriented   Prior to hospitalization functional status: Independent   Current cognitive status: Alert/Oriented   Current Functional Status: Independent   Lives With alone   Able to Return to Prior Arrangements yes   Is patient able to care for self after discharge? Yes   Patient's perception of discharge disposition home or selfcare   Patient currently being followed by outpatient case management? No   Patient currently receives any other outside agency services? No   Equipment Currently Used at Home none   Does the patient have transportation home? Yes   Transportation Anticipated family or friend will provide   Dialysis Name and Scheduled days N/A   Does the patient receive services at the [facility]? No   Discharge Plan A Home   Discharge Plan B Home Health   DME Needed Upon Discharge  none   Patient/Family in Agreement with Plan yes     SW met with patient at the bedside. Patient confirmed  address, pharmacy, emergency contact and PCP. Patient reports having ride home by daughter at d/c. Patient also reports having a nebulizer and oxygen concentrator at home, however reports not using them regularly.

## 2020-10-24 NOTE — SUBJECTIVE & OBJECTIVE
Interval History: Did well overnight  AVFSS on RA  Chest tube output 80cc. No air leak   Pain well controlled even with CURTIS off    Medications:  Continuous Infusions:  Scheduled Meds:   acetaminophen  1,000 mg Oral Q6H    atorvastatin  10 mg Oral QHS    buPROPion  150 mg Oral Daily    carvediloL  25 mg Oral BID    enoxaparin  40 mg Subcutaneous Q24H    famotidine  20 mg Oral BID    furosemide  40 mg Oral Daily    levalbuterol  0.63 mg Nebulization Q8H    methocarbamoL  500 mg Oral TID    mupirocin  1 g Nasal BID    polyethylene glycol  17 g Oral Daily    pregabalin  75 mg Oral QHS    senna-docusate 8.6-50 mg  1 tablet Oral BID    sertraline  100 mg Oral QHS     PRN Meds:bisacodyL, dextrose 50%, dextrose 50%, glucagon (human recombinant), glucose, glucose, insulin aspart U-100, metoclopramide HCl, midazolam, ondansetron, oxyCODONE, oxyCODONE     Review of patient's allergies indicates:   Allergen Reactions    Adhesive Rash     tegaderm burns and blistered skin     Objective:     Vital Signs (Most Recent):  Temp: 97.8 °F (36.6 °C) (10/24/20 0432)  Pulse: 76 (10/24/20 0705)  Resp: 20 (10/24/20 0548)  BP: (!) 140/75 (10/24/20 0432)  SpO2: (!) 94 % (10/24/20 0432) Vital Signs (24h Range):  Temp:  [97 °F (36.1 °C)-98 °F (36.7 °C)] 97.8 °F (36.6 °C)  Pulse:  [73-83] 76  Resp:  [12-20] 20  SpO2:  [92 %-98 %] 94 %  BP: (111-148)/(55-75) 140/75     Intake/Output - Last 3 Shifts       10/22 0700 - 10/23 0659 10/23 0700 - 10/24 0659 10/24 0700 - 10/25 0659    P.O.  480     I.V. (mL/kg)  1000 (11)     Other  0     Total Intake(mL/kg)  1480 (16.3)     Urine (mL/kg/hr)  1590 (0.7)     Emesis/NG output  0     Other  0     Stool  0     Blood  50     Chest Tube  165     Total Output  1805     Net  -325            Stool Occurrence  0 x           SpO2: (!) 94 %  O2 Device (Oxygen Therapy): room air    Physical Exam  Vitals signs and nursing note reviewed.   Constitutional:       Appearance: Normal appearance.   HENT:       Head: Normocephalic.      Mouth/Throat:      Mouth: Mucous membranes are moist.   Eyes:      Extraocular Movements: Extraocular movements intact.      Pupils: Pupils are equal, round, and reactive to light.   Cardiovascular:      Rate and Rhythm: Normal rate and regular rhythm.      Pulses: Normal pulses.   Pulmonary:      Effort: Pulmonary effort is normal.   Abdominal:      General: Abdomen is flat. There is no distension.      Palpations: Abdomen is soft. There is no mass.   Skin:     General: Skin is warm.   Neurological:      General: No focal deficit present.      Mental Status: She is alert and oriented to person, place, and time.   Psychiatric:         Mood and Affect: Mood normal.         Behavior: Behavior normal.         Significant Labs:  ABGs: No results for input(s): PH, PCO2, PO2, HCO3, POCSATURATED, BE in the last 48 hours.  Amylase: No results for input(s): AMYLASE in the last 48 hours.  BMP: No results for input(s): GLU, NA, K, CL, CO2, BUN, CREATININE, CALCIUM, MG in the last 48 hours.  Cardiac markers: No results for input(s): CKMB, CPKMB, TROPONINT, TROPONINI, MYOGLOBIN in the last 48 hours.  CBC: No results for input(s): WBC, RBC, HGB, HCT, PLT, MCV, MCH, MCHC in the last 48 hours.  CMP: No results for input(s): GLU, CALCIUM, ALBUMIN, PROT, NA, K, CO2, CL, BUN, CREATININE, ALKPHOS, ALT, AST, BILITOT in the last 48 hours.    Significant Diagnostics:  I have reviewed all pertinent imaging results/findings within the past 24 hours.    VTE Risk Mitigation (From admission, onward)         Ordered     enoxaparin injection 40 mg  Every 24 hours      10/23/20 1223     IP VTE HIGH RISK PATIENT  Once      10/23/20 1223     Place sequential compression device  Until discontinued      10/23/20 1223

## 2020-10-24 NOTE — PLAN OF CARE
Problem: Occupational Therapy Goal  Goal: Occupational Therapy Goal  Outcome: Met    Pt evaluated and is functionally performing at her baseline.  Pt was able to ambulate and perform ADLs while standing at bathroom sink with Supervision with no AD.  D/c to home with no additional OT needs.    Gokul Holder, OT   10/24/2020

## 2020-10-24 NOTE — PLAN OF CARE
PT evaluation and discharge performed this visit. Pt is independent with functional mobility and is safe to d/c to home when medically appropriate. Please re-consult should status change.    Yessica Owens, PT, DPT  10/24/2020

## 2020-10-25 NOTE — DISCHARGE SUMMARY
[facility]  General Surgery  Discharge Summary      Patient Name: Hannah Montoya  MRN: [id number]  Admission Date: 10/23/2020  Hospital Length of Stay: 1 days  Discharge Date and Time: 10/24/2020  1:39 PM  Attending Physician: No att. providers found   Discharging Provider: Sav Friedman MD  Primary Care Provider: Emanuel Chavez MD     HPI: Patient is a 65 y.o. female former smoker with BiVAICD for NICM (recovered EF), LBBB breast cancer 2016, COPD, DM, HLD, HTN, and RLL lung mass. Presented with SOB in March. COVID negative however noted RLL mass. Treated with ABX. Repeat May 2020 with persistent mass. CT guided biopsy non diagnostic of malignancy. Repeat CT in 2020 with interval enlargement. PET with SUV 22 of RLL mass and right hilar SUV 9. No prior chest operations. Non on blood thinners.      Former smoker. 20 pack years. Quit .  PSH: lumpectomy, , cholecystectomy, tubal ligation    Procedure(s) (LRB):  XI ROBOTIC LYMPHADENECTOMY (Right)     Hospital Course: The patient was admitted and underwent the above listed operations without complications. Please see the full operative note for details. She was admitted to the surgical floor post-operatively. By POD#1, the patient was tolerating a regular diet, pain was well controlled with PRN PO medications, and she was ambulating the halls with minimal assistance. Chest tube removed POD#1. Discharged home with family support and instructions to follow up in clinic.       Consults:     Significant Diagnostic Studies: Labs: All labs within the past 24 hours have been reviewed    Pending Diagnostic Studies:     Procedure Component Value Units Date/Time    Specimen to Pathology, Surgery Pulmonary and Thoracic [[id number]] Collected: 10/23/20 1213    Order Status: Sent Lab Status: In process Updated: 10/23/20 1443        Final Active Diagnoses:    Diagnosis Date Noted POA    PRINCIPAL PROBLEM:  NSCLC of right lung [C34.91]  Yes     NICM (nonischemic cardiomyopathy) [I42.8] 05/02/2018 Yes    Type 2 diabetes mellitus without complication [E11.9] 07/05/2016 Yes    COPD (chronic obstructive pulmonary disease) [J44.9] 04/24/2015 Yes    Essential hypertension [I10] 10/07/2014 Yes      Problems Resolved During this Admission:      Discharged Condition: good    Disposition: Home or Self Care    Follow Up:  Follow-up Information     Ramo Tucker MD In 2 weeks.    Specialties: Cardiothoracic Surgery, Transplant  Contact information:  [address]  [facility] [address]  [phone]                 Patient Instructions:      Diet Adult Regular     Lifting restrictions   Order Comments: Avoid lifting, pushing, or pulling anything heavier than 20lbs for the next 2 weeks.     Notify your health care provider if you experience any of the following:  redness, tenderness, or signs of infection (pain, swelling, redness, odor or green/yellow discharge around incision site)     Notify your health care provider if you experience any of the following:  severe uncontrolled pain     Notify your health care provider if you experience any of the following:  persistent nausea and vomiting or diarrhea     Notify your health care provider if you experience any of the following:  temperature >100.4     Notify your health care provider if you experience any of the following:  difficulty breathing or increased cough     Remove dressing in 48 hours   Order Comments: Can shower like normal after dressing removed. No soaking in bath tub or pool for 2 weeks.     Medications:  Reconciled Home Medications:      Medication List      START taking these medications    oxyCODONE 5 MG immediate release tablet  Commonly known as: ROXICODONE  Take 1 tablet (5 mg total) by mouth every 4 (four) hours as needed for Pain.        CHANGE how you take these medications    atorvastatin 10 MG tablet  Commonly known as: LIPITOR  Take 1 tablet (10 mg total) by mouth once daily.  What  changed: when to take this     furosemide 40 MG tablet  Commonly known as: LASIX  Take 1 tablet (40 mg total) by mouth once daily. TAKE 1 TABLET(40 MG) BY MOUTH TWICE DAILY  What changed: additional instructions        CONTINUE taking these medications    albuterol sulfate 90 mcg/actuation inhaler  Commonly known as: PROAIR RESPICLICK  Inhale 2 puffs into the lungs every 4 (four) hours as needed. Rescue     albuterol-ipratropium 2.5 mg-0.5 mg/3 mL nebulizer solution  Commonly known as: DUO-NEB  Take 3 mLs by nebulization every 6 (six) hours while awake. Rescue     buPROPion 150 MG TB24 tablet  Commonly known as: WELLBUTRIN XL  Take 1 tablet (150 mg total) by mouth once daily.     carvediloL 25 MG tablet  Commonly known as: COREG  TAKE 1 TABLET(25 MG) BY MOUTH TWICE DAILY     cetirizine 10 MG tablet  Commonly known as: ZYRTEC  Take 10 mg by mouth every evening.     fluticasone-salmeterol 250-50 mcg/dose 250-50 mcg/dose diskus inhaler  Commonly known as: WIXELA INHUB  INHALE 1 PUFF INTO THE LUNGS 2 (TWO) TIMES DAILY.     lisinopriL 10 MG tablet  Take 1 tablet (10 mg total) by mouth once daily.     metFORMIN 500 MG tablet  Commonly known as: GLUCOPHAGE  Take 1 tablet (500 mg total) by mouth 2 (two) times daily.     ONE DAILY MULTIVITAMIN per tablet  Generic drug: multivitamin  Take 1 tablet by mouth once daily.     sertraline 100 MG tablet  Commonly known as: ZOLOFT  Take 1 tablet (100 mg total) by mouth every evening.     SPIRIVA WITH HANDIHALER 18 mcg inhalation capsule  Generic drug: tiotropium  INHALE THE CONTENTS OF 1 CAPSULE EVERY DAY     TRUETEST TEST STRIPS Strp  Generic drug: blood sugar diagnostic  test once EVERY MORNING            Sav Friedman MD  General Surgery  [facility]

## 2020-10-25 NOTE — OP NOTE
Date of Procedure: 10/23/2020    Pre-operative Diagnosis: Squamous Cell Carcinoma of the Lower Lobe of the Right Lung    Post-operative Diagnosis: Same    Procedure(s): Right Robot-Assisted Thoracoscopy with Mediastinal and Hilar Lymphadenctomy    Surgeon: Ramo Tucker MD    Assistant(s): Danielle Song PA-C; Anthony Gonzalez MD    Anesthesia: GETA    Findings: Subcarinal and Paratracheal LN negative for malignancy on frozen section.  Hilar LN of concern on PET and positive on FNA densely adherent to basilar segment and middle lobe arterial branches, mandating lower bilobectomy.  Posterior ascending branch of pulmonary artery arising from distal superior segment branch which would have mandated sacrifice as well.  Conversion to thoracotomy would have been required for this extent of resection, which her PFTs and functional status precluded.    Estimated Blood Loss: 5mL    Drains: 24 Burmese Solo drain in right pleural space    Specimen(s): Levels 2R/4R, 7, 11    Complications: None    Indications for Procedure: 64 yo female former smoker with COPD who was found to have a right lower lobe lung mass on work-up for shortness of breath.  It has increased in size on serial imaging and PET revealed it to be hypermetabolic with FDG-avid hilar lymphadenopathy as well.  EBUS-FNA was positive for metastatic squamous cell carcinoma in an 11R lymph node.  It was decided to proceed with resection.  Risks, benefits and possible outcomes were discussed in detail with the patient, and she was given the opportunity to ask questions and have those questions answered to her satisfaction.  she desires to proceed and signed consent.    Procedure in Detail: The patient was taken to the operating room and placed supine on the operating table.  Adequate general anesthesia was achieved.  Double lumen endotracheal tube was placed and its position and function were confirmed with bronchoscopy.  She was turned to the left lateral  decubitus position, padded appropriately and secured.  Right chest was prepped and draped in standard sterile fashion.  Prophylactic intravenous antibiotics were administered.  Right lung was isolated.  Time-out was performed.  The following ports were placed: 8mm da Temi port in the 8th intercostal space posterior axillary line, 8mm da Temi port in the 8th intercostal space in the paraspinous region, 8mm da Temi port in the 8th intercostal space 2 fingerbreadths posterior to the tip of the scapula, 12mm da Temi port in the 8th intercostal space in the anterior axillary line, 12mm AirSeal port in the 10th intercostal space in the anterior-mid axillary line.  Then the da Temi Xi robot was deployed and docked using a 30-degree scope.   Scattered thin adhesions between the lung and chest wall were lysed using electrocautery.  The inferior pulmonary ligament was divided using bipolar cautery.  The pleura was divided over the hilum posteriorly.  Subcarinal lymphadenectomy was performed.  The bronchus intemedius was isolated posteriorly.  The pleura was then opened over the hilum superiorly.  Paratracheal lymphadenectomy was performed.  The pleura was divided over the hilum anteriorly.  Superior, middle and inferior pulmonary veins were identified.  The interlobar pulmonary artery was exposed at the confluence of the fissures.  The basilar and superior segmental branches of the pulmonary artery were identified.  In the posterior portion of the major fissure, a posterior ascending branch of the pulmonary artery was identified arising from the distal superior segmental branch.  It was clearly supplying a portion of the upper lobe and apparent that it would have to be sacrificed to complete a lower lobectomy.  Attention was then turned to the anterior portion of the major fissure.  The hilar lymph node that was FNA-positive for metastatic disease was easily identified.  It was densely adherent to the basilar segmental  and middle lobe branches of the pulmonary artery and it was clear that it would mandate a lower bilobectomy for clearance.  Her pulmonary function studies and functional status were marginal, at best, for a lower bilobectomy.  When the reality of having to sacrifice the arterial supply to a substantial portion of the upper lobe as well was realized, it was clear that this extent of resection (which would have also mandated thoracotomy to be completed safely) was ill-advised.  It was decided to abort the procedure.  Surgicel was packed in the mediastinal lymphadenectomy sites.  Hemostasis was adequate.  A 24 Kazakh straight Solo drain was passed through the assistant port and directed posterior apically.  The lung was inflated under direct vision and filled the chest cavity nicely.  Tube was secured to the skin with Nurolon suture.  Incisions were closed in layers with absorbable suture.  Steri-strips and sterile dressings were applied.  All sponge, needle and instrument counts were correct at the end of the case.  The patient tolerated the procedure well.  There were no immediate complications.  She was extubated in the operating room.    Disposition: PACU in stable condition

## 2020-10-26 ENCOUNTER — PATIENT MESSAGE (OUTPATIENT)
Dept: CARDIOTHORACIC SURGERY | Facility: CLINIC | Age: 65
End: 2020-10-26

## 2020-10-26 ENCOUNTER — PATIENT MESSAGE (OUTPATIENT)
Dept: ADMINISTRATIVE | Facility: OTHER | Age: 65
End: 2020-10-26

## 2020-10-26 DIAGNOSIS — C34.91 NSCLC OF RIGHT LUNG: Primary | ICD-10-CM

## 2020-10-28 ENCOUNTER — PATIENT MESSAGE (OUTPATIENT)
Dept: ADMINISTRATIVE | Facility: HOSPITAL | Age: 65
End: 2020-10-28

## 2020-10-28 ENCOUNTER — PATIENT OUTREACH (OUTPATIENT)
Dept: ADMINISTRATIVE | Facility: HOSPITAL | Age: 65
End: 2020-10-28

## 2020-10-29 LAB
FINAL PATHOLOGIC DIAGNOSIS: NORMAL
FROZEN SECTION DIAGNOSIS: NORMAL
FROZEN SECTION FOOTNOTE: NORMAL
GROSS: NORMAL
Lab: NORMAL

## 2020-10-30 NOTE — PHYSICIAN QUERY
PT Name: Hannah Montoya  MR #: [id number]     Diagnosis Clarification      CDS/: Be Maria Jr, RN              Contact information:[email or web address]    This form is a permanent document in the medical record.     Query Date: October 30, 2020    Dear Provider,  By submitting this query, we are merely seeking further clarification of documentation.  Please utilize your independent clinical judgment when addressing the question(s) below.     The medical record contains the following:    Supporting Clinical Information Location in Medical Record   RLL lung mass and right hilar avidity concerning for NSCLC. Previous biopsy non-diagnostic.      PET and CT concerning for T2N1 NSCLC. Refer to Dr. Weaver for ENB and EBUS-FNA for diagnosis and staging (procedure scheduled for 10/13/2020).     was found to have a right lower lobe lung mass on work-up for shortness of breath.  It has increased in size on serial imaging and PET revealed it to be hypermetabolic with FDG-avid hilar lymphadenopathy as well.  EBUS-FNA was positive for metastatic squamous cell carcinoma in an 11R lymph node.    Repeat May 2020 with persistent mass.   CT guided biopsy non diagnostic of malignancy.     PRINCIPAL PROBLEM:  NSCLC of right lung      10/23 Interval H&P      10/23 Interval H&P        10/23 Op Note            10/24 Discharge Summary      10/24 Discharge Summary     Please clarify if the                      NSCLC of Right Lung                             diagnosis has been:    [X] Ruled In     [  ] Ruled Out     [  ] Other/Clarification of findings (please specify)      [  ] Clinically undetermined           Please document in your progress notes daily for the duration of treatment, until resolved, and include in your discharge summary.

## 2020-11-02 ENCOUNTER — OFFICE VISIT (OUTPATIENT)
Dept: CARDIOTHORACIC SURGERY | Facility: CLINIC | Age: 65
End: 2020-11-02
Payer: MEDICARE

## 2020-11-02 ENCOUNTER — OFFICE VISIT (OUTPATIENT)
Dept: HEMATOLOGY/ONCOLOGY | Facility: CLINIC | Age: 65
End: 2020-11-02
Payer: MEDICARE

## 2020-11-02 ENCOUNTER — OFFICE VISIT (OUTPATIENT)
Dept: RADIATION ONCOLOGY | Facility: CLINIC | Age: 65
End: 2020-11-02
Payer: MEDICARE

## 2020-11-02 ENCOUNTER — RESEARCH ENCOUNTER (OUTPATIENT)
Dept: RESEARCH | Facility: HOSPITAL | Age: 65
End: 2020-11-02

## 2020-11-02 ENCOUNTER — CLINICAL SUPPORT (OUTPATIENT)
Dept: HEMATOLOGY/ONCOLOGY | Facility: CLINIC | Age: 65
End: 2020-11-02
Payer: MEDICARE

## 2020-11-02 VITALS
DIASTOLIC BLOOD PRESSURE: 83 MMHG | OXYGEN SATURATION: 96 % | HEIGHT: 62 IN | HEART RATE: 73 BPM | SYSTOLIC BLOOD PRESSURE: 166 MMHG | HEART RATE: 73 BPM | WEIGHT: 199.94 LBS | HEIGHT: 62 IN | OXYGEN SATURATION: 96 % | RESPIRATION RATE: 16 BRPM | DIASTOLIC BLOOD PRESSURE: 83 MMHG | BODY MASS INDEX: 36.79 KG/M2 | BODY MASS INDEX: 36.79 KG/M2 | SYSTOLIC BLOOD PRESSURE: 166 MMHG | TEMPERATURE: 98 F | WEIGHT: 199.94 LBS

## 2020-11-02 VITALS
SYSTOLIC BLOOD PRESSURE: 166 MMHG | HEART RATE: 73 BPM | HEIGHT: 62 IN | BODY MASS INDEX: 36.79 KG/M2 | TEMPERATURE: 98 F | RESPIRATION RATE: 19 BRPM | WEIGHT: 199.94 LBS | DIASTOLIC BLOOD PRESSURE: 83 MMHG | OXYGEN SATURATION: 98 %

## 2020-11-02 DIAGNOSIS — E11.9 TYPE 2 DIABETES MELLITUS WITHOUT COMPLICATION, WITHOUT LONG-TERM CURRENT USE OF INSULIN: ICD-10-CM

## 2020-11-02 DIAGNOSIS — I10 ESSENTIAL HYPERTENSION: ICD-10-CM

## 2020-11-02 DIAGNOSIS — J41.0 SIMPLE CHRONIC BRONCHITIS: ICD-10-CM

## 2020-11-02 DIAGNOSIS — I42.0 DILATED CARDIOMYOPATHY: ICD-10-CM

## 2020-11-02 DIAGNOSIS — I44.7 LEFT BUNDLE-BRANCH BLOCK: ICD-10-CM

## 2020-11-02 DIAGNOSIS — C34.91 NSCLC OF RIGHT LUNG: Primary | ICD-10-CM

## 2020-11-02 DIAGNOSIS — Z95.810 CARDIAC RESYNCHRONIZATION THERAPY DEFIBRILLATOR (CRT-D) IN PLACE: ICD-10-CM

## 2020-11-02 DIAGNOSIS — Z85.3 HISTORY OF BREAST CANCER IN FEMALE: ICD-10-CM

## 2020-11-02 DIAGNOSIS — C34.91 NSCLC OF RIGHT LUNG: ICD-10-CM

## 2020-11-02 PROCEDURE — 3008F PR BODY MASS INDEX (BMI) DOCUMENTED: ICD-10-PCS | Mod: HCNC,CPTII,S$GLB, | Performed by: INTERNAL MEDICINE

## 2020-11-02 PROCEDURE — 3079F PR MOST RECENT DIASTOLIC BLOOD PRESSURE 80-89 MM HG: ICD-10-PCS | Mod: HCNC,CPTII,S$GLB, | Performed by: RADIOLOGY

## 2020-11-02 PROCEDURE — 3008F BODY MASS INDEX DOCD: CPT | Mod: HCNC,CPTII,S$GLB, | Performed by: INTERNAL MEDICINE

## 2020-11-02 PROCEDURE — 99204 PR OFFICE/OUTPT VISIT, NEW, LEVL IV, 45-59 MIN: ICD-10-PCS | Mod: HCNC,S$GLB,, | Performed by: INTERNAL MEDICINE

## 2020-11-02 PROCEDURE — 3008F BODY MASS INDEX DOCD: CPT | Mod: HCNC,CPTII,S$GLB, | Performed by: RADIOLOGY

## 2020-11-02 PROCEDURE — 99024 PR POST-OP FOLLOW-UP VISIT: ICD-10-PCS | Mod: HCNC,S$GLB,, | Performed by: THORACIC SURGERY (CARDIOTHORACIC VASCULAR SURGERY)

## 2020-11-02 PROCEDURE — 3008F PR BODY MASS INDEX (BMI) DOCUMENTED: ICD-10-PCS | Mod: HCNC,CPTII,S$GLB, | Performed by: RADIOLOGY

## 2020-11-02 PROCEDURE — 3079F DIAST BP 80-89 MM HG: CPT | Mod: HCNC,CPTII,S$GLB, | Performed by: RADIOLOGY

## 2020-11-02 PROCEDURE — 99204 OFFICE O/P NEW MOD 45 MIN: CPT | Mod: HCNC,S$GLB,, | Performed by: INTERNAL MEDICINE

## 2020-11-02 PROCEDURE — 99205 PR OFFICE/OUTPT VISIT, NEW, LEVL V, 60-74 MIN: ICD-10-PCS | Mod: HCNC,S$GLB,, | Performed by: RADIOLOGY

## 2020-11-02 PROCEDURE — 3077F PR MOST RECENT SYSTOLIC BLOOD PRESSURE >= 140 MM HG: ICD-10-PCS | Mod: HCNC,CPTII,S$GLB, | Performed by: INTERNAL MEDICINE

## 2020-11-02 PROCEDURE — 3051F PR MOST RECENT HEMOGLOBIN A1C LEVEL 7.0 - < 8.0%: ICD-10-PCS | Mod: HCNC,CPTII,S$GLB, | Performed by: INTERNAL MEDICINE

## 2020-11-02 PROCEDURE — 3077F PR MOST RECENT SYSTOLIC BLOOD PRESSURE >= 140 MM HG: ICD-10-PCS | Mod: HCNC,CPTII,S$GLB, | Performed by: RADIOLOGY

## 2020-11-02 PROCEDURE — 99999 PR PBB SHADOW E&M-EST. PATIENT-LVL V: ICD-10-PCS | Mod: PBBFAC,HCNC,, | Performed by: INTERNAL MEDICINE

## 2020-11-02 PROCEDURE — 99999 PR PBB SHADOW E&M-EST. PATIENT-LVL III: CPT | Mod: PBBFAC,HCNC,, | Performed by: THORACIC SURGERY (CARDIOTHORACIC VASCULAR SURGERY)

## 2020-11-02 PROCEDURE — 99205 OFFICE O/P NEW HI 60 MIN: CPT | Mod: HCNC,S$GLB,, | Performed by: RADIOLOGY

## 2020-11-02 PROCEDURE — 99999 PR PBB SHADOW E&M-EST. PATIENT-LVL V: CPT | Mod: PBBFAC,HCNC,, | Performed by: INTERNAL MEDICINE

## 2020-11-02 PROCEDURE — 99999 PR PBB SHADOW E&M-EST. PATIENT-LVL IV: CPT | Mod: PBBFAC,HCNC,, | Performed by: RADIOLOGY

## 2020-11-02 PROCEDURE — 3079F PR MOST RECENT DIASTOLIC BLOOD PRESSURE 80-89 MM HG: ICD-10-PCS | Mod: HCNC,CPTII,S$GLB, | Performed by: INTERNAL MEDICINE

## 2020-11-02 PROCEDURE — 3077F SYST BP >= 140 MM HG: CPT | Mod: HCNC,CPTII,S$GLB, | Performed by: INTERNAL MEDICINE

## 2020-11-02 PROCEDURE — 3079F DIAST BP 80-89 MM HG: CPT | Mod: HCNC,CPTII,S$GLB, | Performed by: INTERNAL MEDICINE

## 2020-11-02 PROCEDURE — 3051F HG A1C>EQUAL 7.0%<8.0%: CPT | Mod: HCNC,CPTII,S$GLB, | Performed by: INTERNAL MEDICINE

## 2020-11-02 PROCEDURE — 3077F SYST BP >= 140 MM HG: CPT | Mod: HCNC,CPTII,S$GLB, | Performed by: RADIOLOGY

## 2020-11-02 PROCEDURE — 99024 POSTOP FOLLOW-UP VISIT: CPT | Mod: HCNC,S$GLB,, | Performed by: THORACIC SURGERY (CARDIOTHORACIC VASCULAR SURGERY)

## 2020-11-02 PROCEDURE — 99999 PR PBB SHADOW E&M-EST. PATIENT-LVL IV: ICD-10-PCS | Mod: PBBFAC,HCNC,, | Performed by: RADIOLOGY

## 2020-11-02 PROCEDURE — 99999 PR PBB SHADOW E&M-EST. PATIENT-LVL III: ICD-10-PCS | Mod: PBBFAC,HCNC,, | Performed by: THORACIC SURGERY (CARDIOTHORACIC VASCULAR SURGERY)

## 2020-11-02 NOTE — LETTER
November 2, 2020      QUINCY Rogers  [address] [facility] [address]           [facility] 3rd [facility]  [address]  [place] [address]  Phone: [phone]          Patient: Hannah Montoya   MR Number: [id number]   YOB: 1955   Date of Visit: 11/2/2020       Dear Marilyn Baum:    Thank you for referring Hannah Montoya to me for evaluation. Attached you will find relevant portions of my assessment and plan of care.    If you have questions, please do not hesitate to call me. I look forward to following Hannah Montoya along with you.    Sincerely,    Javi Avina MD    Enclosure  CC:  No Recipients    If you would like to receive this communication electronically, please contact [email or web address] or [phone] to request more information on [employer] Link access.    For providers and/or their staff who would like to refer a patient to [facility], please contact us through our one-stop-shop provider referral line, [facility], at [phone].    If you feel you have received this communication in error or would no longer like to receive these types of communications, please e-mail [email or web address]

## 2020-11-02 NOTE — Clinical Note
- schedule for port placement next available  - patient will return Friday to consent for clinical trial.  After that patient will be scheduled per protocol.

## 2020-11-02 NOTE — PROGRESS NOTES
NEW ONCOLOGY VISIT-ESTABLISH CARE.     Reason for visit: New Medical Oncology visit-establish care for stage IIIA Squamous Cell Carcinoma of the right lung    Best Contact Phone Number(s): [phone] (home)      Cancer/Stage/TNM:   Cancer Staging  NSCLC of right lung  Staging form: Lung, AJCC 8th Edition  - Clinical stage from 9/29/2020: Stage IIIA (cT3, cN1, cM0) - Signed by Ramo Tucker MD on 10/24/2020       Oncology History   NSCLC of right lung   9/29/2020 Cancer Staged    Staging form: Lung, AJCC 8th Edition  - Clinical stage from 9/29/2020: Stage IIIA (cT3, cN1, cM0)     10/14/2020 Initial Diagnosis    NSCLC of right lung          HPI:   65 y.o. female with 40 pack year smoking history, COPD, BiVAICD for NICM (recovered EF), DMII, HTN, and history of stage I breast cancer s/p lumpectomy presents to clinic with a newly diagnosed stage III NSCLC.  Patient presented with SOB in March. COVID negative however noted RLL mass. Treated with ABX. Repeat May 2020 with persistent mass. CT guided biopsy non diagnostic of malignancy. Repeat CT in sept 2020 with interval enlargement. PET with SUV 22 of RLL mass and right hilar SUV 9. She had an EBUS 10/13 and 11R LN biopsy revealed SCC.     Plan was for robotic resection of mass. Hilar LN of concern on PET and positive on FNA was densely adherent to basilar segment and middle lobe arterial branches, mandating lower bilobectomy therefore resection was aborted.  Now referred to medical oncology and radiation oncology for evaluation for chemoradiation.    Patient currently has a good performance status.  She has some LE weakness and SINGH on significant exertion walking long distances, but otherwise denies symptoms.  She does not have chest pain, swelling, wheezing, hemoptysis, HA, vision changes.    Practical Problems Physical Problems   : (P) No Appearance: (P) No   Housing: (P) No Bathing / Dressing: (P) No   Insurance / Financial: (P) No Breathing: (P) No     Transportation: (P) No  Changes in Urination: (P) No    Work / School: (P) No  Constipation: (P) No   Treatment Decisions: (P) No  Diarrhea: (P) No     Eating: (P) No    Family Problems Fatigue: (P) No    Dealing with Children: (P) No Feeling Swollen: (P) No    Dealing with Partner: (P) No Fevers: (P) No    Ability to Have Children: (P) No  Getting Around: (P) No       Indigestion: (P) No     Memory / Concentration: (P) No   Emotional Problems Mouth Sores: (P) No    Depression: (P) No  Nausea: (P) No    Fears: (P) No  Nose Dry / Congested: (P) No    Nervousness: Yes  Pain: (P) No    Sadness: (P) No Sexual: (P) No    Worry: (P) No Skin Dry / Itchy: (P) No    Loss of Interest in Usual Activities: (P) No Sleep: (P) No     Substance Abuse: (P) No    Spiritual/Religions Concerns Tingling in Hands / Feet: (P) No   Spritual / Sikh Concerns: (P) No         Other Problems            ROS:   Review of Systems   Constitutional: Negative for activity change, chills, fatigue, fever and unexpected weight change.   HENT: Negative for mouth sores, nosebleeds and sore throat.    Respiratory: Positive for shortness of breath (Dyspnea on exertion with significant exertion). Negative for cough and wheezing.    Cardiovascular: Positive for palpitations. Negative for chest pain and leg swelling.   Gastrointestinal: Negative for abdominal distention, abdominal pain and blood in stool.   Endocrine: Negative for cold intolerance and heat intolerance.   Genitourinary: Negative for dysuria, flank pain and frequency.   Musculoskeletal: Negative for arthralgias, joint swelling and myalgias.   Skin: Negative for color change, rash and wound.   Neurological: Negative for dizziness, light-headedness and headaches.   Hematological: Negative for adenopathy. Does not bruise/bleed easily.        Past Medical History:   Past Medical History:   Diagnosis Date    AICD (automatic cardioverter/defibrillator) present     Asthma     bronchitis in  past    Breast cancer 2016    right    Cardiac pacemaker     Cardiomyopathy     COPD (chronic obstructive pulmonary disease)     Diabetes mellitus, type 2     Hyperglycemia     Hyperlipidemia     Hypertension     Malignant neoplasm of overlapping sites of female breast 2016    Nuclear sclerosis of both eyes 2020    Respiratory distress 3/12/2020        Past Surgical History:   Past Surgical History:   Procedure Laterality Date    BREAST BIOPSY Right 2016    IDC    BREAST LUMPECTOMY Right     CARDIAC CATHETERIZATION Bilateral 2017    CARDIAC DEFIBRILLATOR PLACEMENT Left 08/10/2017    CARDIAC DEFIBRILLATOR PLACEMENT Left 2018     SECTION      x2    CHOLECYSTECTOMY      LUNG BIOPSY N/A 2020    Procedure: BIOPSY, LUNG;  Surgeon: Kalpesh Diagnostic Provider;  Location: [facility];  Service: Radiology;  Laterality: N/A;  8AM START  RN PREOP 2020---COVID NEGATIVE    NAVIGATIONAL BRONCHOSCOPY N/A 10/13/2020    Procedure: BRONCHOSCOPY, NAVIGATIONAL;  Surgeon: Jamilah Weaver MD;  Location: [address];  Service: Pulmonary;  Laterality: N/A;    REVISION OF IMPLANTABLE CARDIOVERTER-DEFIBRILLATOR (ICD) ELECTRODE LEAD PLACEMENT N/A 6/15/2018    Procedure: REVISION-LEAD-ICD;  Surgeon: Javy Gates MD;  Location: [facility] CATH LAB;  Service: Cardiology;  Laterality: N/A;  LBBB, Bi-V ICD HIS Ld rev, MDT, Choice, MB, 3 Prep    ROBOT-ASSISTED LAPAROSCOPIC LYMPHADENECTOMY USING DA SALVADOR XI Right 10/23/2020    Procedure: XI ROBOTIC LYMPHADENECTOMY;  Surgeon: Ramo Tucker MD;  Location: [address];  Service: Thoracic;  Laterality: Right;    TUBAL LIGATION          Family History:   Family History   Problem Relation Age of Onset    Kidney disease Mother     Cataracts Mother     Kidney disease Father     Cataracts Father     Clotting disorder Brother     No Known Problems Daughter     No Known Problems Son     No Known Problems Sister     No Known  Problems Maternal Aunt     No Known Problems Maternal Uncle     No Known Problems Paternal Aunt     No Known Problems Paternal Uncle     Macular degeneration Maternal Grandmother     No Known Problems Maternal Grandfather     No Known Problems Paternal Grandmother     No Known Problems Paternal Grandfather     Breast cancer Neg Hx     Ovarian cancer Neg Hx     Amblyopia Neg Hx     Blindness Neg Hx     Cancer Neg Hx     Diabetes Neg Hx     Glaucoma Neg Hx     Hypertension Neg Hx     Retinal detachment Neg Hx     Strabismus Neg Hx     Stroke Neg Hx     Thyroid disease Neg Hx         Social History:   Social History     Tobacco Use    Smoking status: Former Smoker     Packs/day: 0.50     Years: 40.00     Pack years: 20.00     Types: Cigarettes     Quit date: 2016     Years since quittin.2    Smokeless tobacco: Never Used   Substance Use Topics    Alcohol use: Yes     Frequency: Monthly or less     Drinks per session: 1 or 2     Binge frequency: Never     Comment: rare- holiday        Allergies:   Review of patient's allergies indicates:   Allergen Reactions    Adhesive Rash     tegaderm burns and blistered skin        Medications:   Current Outpatient Medications   Medication Sig Dispense Refill    albuterol sulfate (PROAIR RESPICLICK) 90 mcg/actuation AePB Inhale 2 puffs into the lungs every 4 (four) hours as needed. Rescue 1 each 5    albuterol-ipratropium (DUO-NEB) 2.5 mg-0.5 mg/3 mL nebulizer solution Take 3 mLs by nebulization every 6 (six) hours while awake. Rescue 30 mL 5    atorvastatin (LIPITOR) 10 MG tablet Take 1 tablet (10 mg total) by mouth once daily. (Patient taking differently: Take 10 mg by mouth every evening. ) 90 tablet 3    buPROPion (WELLBUTRIN XL) 150 MG TB24 tablet TAKE 1 TABLET BY MOUTH EVERY DAY 90 tablet 1    carvediloL (COREG) 25 MG tablet TAKE 1 TABLET(25 MG) BY MOUTH TWICE DAILY 180 tablet 3    cetirizine (ZYRTEC) 10 MG tablet Take 10 mg by mouth every  "evening.       fluticasone-salmeterol diskus inhaler 250-50 mcg INHALE 1 PUFF INTO THE LUNGS 2 (TWO) TIMES DAILY. (Patient taking differently: INHALE 1 PUFF INTO THE LUNGS 2 (TWO) TIMES DAILY.) 60 each 5    furosemide (LASIX) 40 MG tablet Take 1 tablet (40 mg total) by mouth once daily. TAKE 1 TABLET(40 MG) BY MOUTH TWICE DAILY 90 tablet 1    metFORMIN (GLUCOPHAGE) 500 MG tablet Take 1 tablet (500 mg total) by mouth 2 (two) times daily. 180 tablet 1    multivitamin (ONE DAILY MULTIVITAMIN) per tablet Take 1 tablet by mouth once daily.      sertraline (ZOLOFT) 100 MG tablet Take 1 tablet (100 mg total) by mouth every evening. 90 tablet 1    SPIRIVA WITH HANDIHALER 18 mcg inhalation capsule INHALE THE CONTENTS OF 1 CAPSULE EVERY DAY 90 capsule 0    TRUETEST TEST STRIPS Strp test once EVERY MORNING 100 strip 1    lisinopril 10 MG tablet Take 1 tablet (10 mg total) by mouth once daily. (Patient not taking: Reported on 10/1/2020) 90 tablet 3    oxyCODONE (ROXICODONE) 5 MG immediate release tablet Take 1 tablet (5 mg total) by mouth every 4 (four) hours as needed for Pain. 30 tablet 0     No current facility-administered medications for this visit.         Physical Exam:   BP (!) 166/83   Pulse 73   Temp 98.2 °F (36.8 °C) (Oral)   Resp 16   Ht 5' 2" (1.575 m)   Wt 90.7 kg (199 lb 15.3 oz)   LMP  (LMP Unknown)   SpO2 96%   BMI 36.57 kg/m²      ECOG Performance Status: (foot note - ECOG PS provided by Eastern Cooperative Oncology Group) 1 - Symptomatic but completely ambulatory    Physical Exam  Constitutional:       Appearance: She is well-developed.   HENT:      Head: Normocephalic and atraumatic.   Eyes:      Conjunctiva/sclera: Conjunctivae normal.      Pupils: Pupils are equal, round, and reactive to light.   Neck:      Musculoskeletal: Normal range of motion and neck supple.   Cardiovascular:      Rate and Rhythm: Normal rate and regular rhythm.      Heart sounds: No murmur. No friction rub. "   Pulmonary:      Effort: Pulmonary effort is normal.      Breath sounds: Normal breath sounds.   Abdominal:      General: There is no distension.      Palpations: Abdomen is soft.      Tenderness: There is no abdominal tenderness.   Musculoskeletal: Normal range of motion.         General: No swelling.   Lymphadenopathy:      Cervical: No cervical adenopathy.   Skin:     General: Skin is warm and dry.   Neurological:      General: No focal deficit present.      Mental Status: She is alert and oriented to person, place, and time.   Psychiatric:         Mood and Affect: Mood normal.         Behavior: Behavior normal.           Labs:   No results found for this or any previous visit (from the past 48 hour(s)).       Imaging:   X-Ray Chest AP Single View  Narrative: EXAMINATION:  XR CHEST 1 VIEW    CLINICAL HISTORY:  post op thoracic surgery, chest tubes;    TECHNIQUE:  Single frontal view of the chest was performed.    COMPARISON:  Prior exams dating back to 2009    FINDINGS:  Lines and tubes project in expected position.  Left chest wall cardiac device.    Minimal right apical pneumothorax, more conspicuous since October 23, 2020.    Unchanged left lower lung zone streaky opacities, probably atelectasis.  Right lung is clear.  No effusion.  No left pneumothorax.  Impression: Since October 23, 2020, minimal right apical pneumothorax.    Electronically signed by: Conner Latham MD  Date:    10/24/2020  Time:    09:12            Diagnoses:       1. NSCLC of right lung    2. History of breast cancer in female    3. Dilated cardiomyopathy    4. Left bundle-branch block    5. Cardiac resynchronization therapy defibrillator (CRT-D) in place    6. Essential hypertension    7. Simple chronic bronchitis    8. Type 2 diabetes mellitus without complication, without long-term current use of insulin          Assessment and Plan:         1. Stage IIIA NSCLC  Plan is for definitive chemoRT, will need re-staging scans prior.   Reviewed with patient in detail her diagnosis, stage, treatment options, and prognosis (3 year OS 66% vs. 43.5% without durvalumab) with standard of care chemoRT followed by maintenance immunotherapy.  Discussed with patient our [id number] clinical trial, a randomized control trial evaluating combination chemoRT + durvalumab followed by maintenance vs. SOC chemoRT -> maintenance.  - patient will discuss the clinical trial with our research nurse and potentially consent today or later this week if a candidate and interested.  Patient has a good performance status ECOG 1 and life expectancy > 12 weeks.  - referring to surgery for port placement.    2. 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     3,4,5 Stable, follows with cardiology.  AICD placed, but patient now has recovered EF and only takes lasix prn.  NYHA class I.    6 Controlled continue to follow with PCP and cardiologist.    7 Controlled on inhalers    8 Last HbA1c 7, controlled        Greater than 50% of this time involved counseling or coordination of care. I have provided the patient with an opportunity to ask questions and have all questions answered to his satisfaction.     she will return to clinic per protocol if patient is interested in clinical trial, prior to C1 if proceeding with SOC. But knows to call in the interim if symptoms change or should a problem arise.    Javi Avina MD  Medical Oncology  [facility]

## 2020-11-02 NOTE — LETTER
November 2, 2020      QUINCY Rogers  [address] [facility] [address]           [facility] 3rd Floor  [address]  [place] [address]  Phone: [phone]  Fax: [phone]          Patient: Hannah Montoya   MR Number: [id number]   YOB: 1955   Date of Visit: 11/2/2020       Dear Marilyn Baum:    Thank you for referring Hannah Montoya to me for evaluation. Attached you will find relevant portions of my assessment and plan of care.    If you have questions, please do not hesitate to call me. I look forward to following Hannah Montoya along with you.    Sincerely,    Harvinder Lara MD    Enclosure  CC:  No Recipients    If you would like to receive this communication electronically, please contact [email or web address] or [phone] to request more information on [employer] access.    For providers and/or their staff who would like to refer a patient to [facility], please contact us through our one-stop-shop provider referral line, [facility] , at [phone].    If you feel you have received this communication in error or would no longer like to receive these types of communications, please e-mail [email or web address]

## 2020-11-02 NOTE — PROGRESS NOTES
Subjective:       Patient ID: Hannah Montoya is a 65 y.o. female.    Chief Complaint: Consult    Diagnosis:  NSCLC    Procedure(s) and date(s): 10/23/20- Right Robot-Assisted Thoracoscopy with Mediastinal and Hilar Lymphadenctomy    Pathology: Levels 2R/4R,  7, 11 = negative for malignancy      Post-operative therapy: Definitive chemoradiation     HPI   65 year old female with history of  Stage IIIA (T3 N1 M0) squamous cell carcinoma of right lower lobe returns for follow up s/p aborted robotic right lower lobectomy. Procedure aborted due to intraoperative findings of hilar LN adheared to basilar segment and middle lobe arterial branches which would mandate lower bilobectomy.  Additionally, posterior ascending branch of pulmonary artery arising from distal superior segment branch would have mandated sacrifice as well.  Conversion to thoracotomy would have been required for this extent of resection, which her PFTs and functional status precluded. Uncomplicated post op course. Today she reports feeling well. Pain well controlled with Tylenol. Reports she had right buttock soreness after surgery that has improved. Denies fever, chills, SOB, CP, N/V or changes in bowel and bladder functioning.     Review of Systems   Constitutional: Negative for activity change, appetite change, fatigue and fever.   HENT: Negative for trouble swallowing and voice change.    Eyes: Negative for visual disturbance.   Respiratory: Negative for chest tightness, shortness of breath and wheezing.    Cardiovascular: Negative for palpitations and claudication.   Gastrointestinal: Negative for abdominal pain, diarrhea, nausea and vomiting.   Genitourinary: Negative for difficulty urinating.   Musculoskeletal: Negative for arthralgias and myalgias.   Neurological: Negative for dizziness, light-headedness, numbness and headaches.   Hematological: Negative for adenopathy.   Psychiatric/Behavioral: Negative for confusion.         Objective:  "        Vitals:    11/02/20 1302   BP: (!) 166/83   Pulse: 73   SpO2: 96%   Weight: 90.7 kg (199 lb 15.3 oz)   Height: 5' 2" (1.575 m)   PainSc: 0-No pain         Physical Exam  Constitutional:       Appearance: Normal appearance.   HENT:      Head: Normocephalic.      Mouth/Throat:      Mouth: Mucous membranes are moist.   Eyes:      General: No scleral icterus.     Pupils: Pupils are equal, round, and reactive to light.   Cardiovascular:      Rate and Rhythm: Normal rate and regular rhythm.      Pulses: Normal pulses.   Pulmonary:      Effort: Pulmonary effort is normal.      Breath sounds: Normal breath sounds. No wheezing.      Comments: Right chest incisions well healed. Mild erythema at chest tube suture site. No drainage or induration. Tegaderm rash.  Abdominal:      General: Abdomen is flat. Bowel sounds are normal. There is no distension.      Tenderness: There is no abdominal tenderness.   Musculoskeletal:      Right lower leg: No edema.      Left lower leg: No edema.   Lymphadenopathy:      Cervical: No cervical adenopathy.   Skin:     Coloration: Skin is not jaundiced.   Neurological:      General: No focal deficit present.      Mental Status: She is alert and oriented to person, place, and time.   Psychiatric:         Mood and Affect: Mood normal.           Assessment:       65 year old female with history of  Stage IIIA (T3 N1 M0) squamous cell carcinoma of right lower lobe returns for follow up s/p aborted robotic right lower lobectomy.    Plan:     Doing well.  To see medical and radiation oncology today  RTC with me PRN  "

## 2020-11-02 NOTE — PROGRESS NOTES
Protocol: Randomized Phase III Trial of [id number] (durvalumab) as Concurrent and Consolidative Therapy or Consolidative Therapy Alone for Unresectable Stage 3 NSCLC   Protocol # SV9085          IRB # [id number]  PI: Carmen Akhtar MD  Treating Physician: Javi Avina MD    November 2, 2020     Consent Review      Patient was seen as a new consult by Dr. Avina and Dr. Lara with stage III unresectable lung cancer. [id number] was presented to patient by Dr. Avina. I reviewed the trial consent and screening process with the patient and daughter. Opportunities for questions provided. The patient would like to take the consent home and think about the trial. A copy of the consent along with my contact information was provided. She will call me Friday prior to her radiation simulation if she is interested in consenting/participating.

## 2020-11-02 NOTE — PROGRESS NOTES
11/02/2020    Radiation Oncology Consultation    Assessment   This is a 65 y.o. y/o female with Stage IIIA (cT3 cN0 M0) RLL NSCLC (squam) diagnosed on EBUS biopsy of 11R node 10/13/20. Resection would require bilobectomy, which her PFT's could not support. She is referred for consideration of definitive chemo-RT.     I discussed the natural history and treatment options available for locally advanced NSCLC. The PACIFIC trial demonstrated significant improvement in Overall Survival for patients with Stage III disease treated with concurrent chemo-RT followed by immunotherapy (2-yr OS 66% vs 55% placebo). I recommend treating gross disease with margin to 60 Gy in 30 fractions using IMRT to limit dose to uninvolved lung, esophagus, spinal cord. Potential short- and long-term side effects of radiation therapy were reviewed. At the end of our discussion, she wished to proceed with the recommended treatment.         Plan   1) Schedule CT Simulation for radiation treatment planning.        Chief Complaint   Patient presents with    Lung Cancer     consult        HPI: Mrs. Sierra is a 64 y/o female who presented in March 2020 with worsening dyspnea. She was treated for COPD exacerbation and PNA (Covid negative). CTA Chest 3/11/20 demonstrated a 3.2 cm RLL mass in addition to other infiltrates. This was followed with CT Chest 5/14/20 that demonstrated improvement in other infiltrates but persistence of 3.5 cm RLL mass. Biopsy was attempted 5/28/20 but non-diagnostic. Continued observation was elected. Re-staging CT Chest 9/16/20 demonstrated interval enlargement of RLL mass to 5.1 cm. PET/CT 9/29/20 demonstrated uptake in RLL mass and Right hilar node. She underwent EBUS by Dr. Weaver 10/13/20 with biopsy of 11R node positive for squamous cell carcinoma; mediastinal nodes negative. She was evaluated by Dr. Tucker and taken to OR 10/23/20 for attempted alise dissection and resection of primary; however, procedure was aborted  when it was determined the Right hilar adenopathy resection would require bilobectomy, which her PFTs could not support. She is referred for consideration of definitive chemo-RT.     In clinic today, she is accompanied by her daughter. She denies fevers, weight loss, dyspnea, cough, or chest pain.       Prior Radiation History: None    Past Medical History:   Diagnosis Date    AICD (automatic cardioverter/defibrillator) present     Asthma     bronchitis in past    Breast cancer 2016    right    Cardiac pacemaker     Cardiomyopathy     COPD (chronic obstructive pulmonary disease)     Diabetes mellitus, type 2     Hyperglycemia     Hyperlipidemia     Hypertension     Malignant neoplasm of overlapping sites of female breast 2016    Nuclear sclerosis of both eyes 2020    Respiratory distress 3/12/2020       Past Surgical History:   Procedure Laterality Date    BREAST BIOPSY Right 2016    [facility]    BREAST LUMPECTOMY Right     CARDIAC CATHETERIZATION Bilateral 2017    CARDIAC DEFIBRILLATOR PLACEMENT Left 08/10/2017    CARDIAC DEFIBRILLATOR PLACEMENT Left 2018     SECTION      x2    CHOLECYSTECTOMY      LUNG BIOPSY N/A 2020    Procedure: BIOPSY, LUNG;  [facility];  Location: [facility] OR;  Service: Radiology;  Laterality: N/A;  8AM START  RN PREOP 2020---COVID NEGATIVE    NAVIGATIONAL BRONCHOSCOPY N/A 10/13/2020    Procedure: BRONCHOSCOPY, NAVIGATIONAL;  Surgeon: Jamilah Weaver MD;  Location: [address];  Service: Pulmonary;  Laterality: N/A;    REVISION OF IMPLANTABLE CARDIOVERTER-DEFIBRILLATOR (ICD) ELECTRODE LEAD PLACEMENT N/A 6/15/2018    Procedure: REVISION-LEAD-ICD;  Surgeon: Javy Gates MD;  Location: [facility] CATH LAB;  Service: Cardiology;  Laterality: N/A;  LBBB, Bi-V ICD HIS Elmo jordan, MDT, Choice, MB, 3 Prep    ROBOT-ASSISTED LAPAROSCOPIC LYMPHADENECTOMY USING DA SALVADOR XI Right 10/23/2020    Procedure: XI ROBOTIC  LYMPHADENECTOMY;  Surgeon: Ramo Tucker MD;  Location: [facility] OR [address] [place] [address];  Service: Thoracic;  Laterality: Right;    TUBAL LIGATION         Social History     Tobacco Use    Smoking status: Former Smoker     Packs/day: 0.50     Years: 40.00     Pack years: 20.00     Types: Cigarettes     Quit date: 2016     Years since quittin.2    Smokeless tobacco: Never Used   Substance Use Topics    Alcohol use: Yes     Frequency: Monthly or less     Drinks per session: 1 or 2     Binge frequency: Never     Comment: rare- holiday    Drug use: No       Cancer-related family history is negative for Breast cancer, Ovarian cancer, and Cancer.    Current Outpatient Medications on File Prior to Visit   Medication Sig Dispense Refill    albuterol sulfate (PROAIR RESPICLICK) 90 mcg/actuation AePB Inhale 2 puffs into the lungs every 4 (four) hours as needed. Rescue 1 each 5    albuterol-ipratropium (DUO-NEB) 2.5 mg-0.5 mg/3 mL nebulizer solution Take 3 mLs by nebulization every 6 (six) hours while awake. Rescue 30 mL 5    atorvastatin (LIPITOR) 10 MG tablet Take 1 tablet (10 mg total) by mouth once daily. (Patient taking differently: Take 10 mg by mouth every evening. ) 90 tablet 3    buPROPion (WELLBUTRIN XL) 150 MG TB24 tablet TAKE 1 TABLET BY MOUTH EVERY DAY 90 tablet 1    carvediloL (COREG) 25 MG tablet TAKE 1 TABLET(25 MG) BY MOUTH TWICE DAILY 180 tablet 3    cetirizine (ZYRTEC) 10 MG tablet Take 10 mg by mouth every evening.       fluticasone-salmeterol diskus inhaler 250-50 mcg INHALE 1 PUFF INTO THE LUNGS 2 (TWO) TIMES DAILY. (Patient taking differently: INHALE 1 PUFF INTO THE LUNGS 2 (TWO) TIMES DAILY.) 60 each 5    furosemide (LASIX) 40 MG tablet Take 1 tablet (40 mg total) by mouth once daily. TAKE 1 TABLET(40 MG) BY MOUTH TWICE DAILY 90 tablet 1    metFORMIN (GLUCOPHAGE) 500 MG tablet Take 1 tablet (500 mg total) by mouth 2 (two) times daily. 180 tablet 1    multivitamin (ONE DAILY MULTIVITAMIN) per  "tablet Take 1 tablet by mouth once daily.      oxyCODONE (ROXICODONE) 5 MG immediate release tablet Take 1 tablet (5 mg total) by mouth every 4 (four) hours as needed for Pain. 30 tablet 0    sertraline (ZOLOFT) 100 MG tablet Take 1 tablet (100 mg total) by mouth every evening. 90 tablet 1    SPIRIVA WITH HANDIHALER 18 mcg inhalation capsule INHALE THE CONTENTS OF 1 CAPSULE EVERY DAY 90 capsule 0    TRUETEST TEST STRIPS Strp test once EVERY MORNING 100 strip 1    lisinopril 10 MG tablet Take 1 tablet (10 mg total) by mouth once daily. (Patient not taking: Reported on 10/1/2020) 90 tablet 3     No current facility-administered medications on file prior to visit.        Review of patient's allergies indicates:   Allergen Reactions    Adhesive Rash     tegaderm burns and blistered skin       Review of Systems   Constitutional: Negative for fever and weight loss.   HENT: Negative for ear pain and sore throat.    Eyes: Negative for blurred vision and double vision.   Respiratory: Negative for cough, hemoptysis and shortness of breath.    Cardiovascular: Negative for chest pain and leg swelling.   Gastrointestinal: Positive for diarrhea. Negative for abdominal pain, constipation, heartburn and nausea.   Genitourinary: Negative for dysuria and hematuria.   Musculoskeletal: Negative for falls and joint pain.   Neurological: Negative for tingling, speech change, focal weakness, seizures and headaches.   Psychiatric/Behavioral: Negative for depression. The patient is not nervous/anxious.         Vital Signs: BP (!) 166/83 (BP Location: Left arm, Patient Position: Sitting, BP Method: Medium (Automatic))   Pulse 73   Temp 98.1 °F (36.7 °C)   Resp 19   Ht 5' 2" (1.575 m)   Wt 90.7 kg (199 lb 15.3 oz)   LMP  (LMP Unknown)   SpO2 98%   BMI 36.57 kg/m²     ECOG Performance Status: 1 - Ambulates, capable of light work    Physical Exam   Constitutional: She is oriented to person, place, and time and well-developed, " well-nourished, and in no distress.   HENT:   Head: Normocephalic and atraumatic.   Mouth/Throat: Oropharynx is clear and moist.   Eyes: EOM are normal. No scleral icterus.   Neck: Normal range of motion. Neck supple.   Pulmonary/Chest: No accessory muscle usage. No respiratory distress.   Abdominal: Soft. Normal appearance. She exhibits no distension.   Musculoskeletal: Normal range of motion.         General: No edema.   Lymphadenopathy:     She has no cervical adenopathy.        Right: No supraclavicular adenopathy present.        Left: No supraclavicular adenopathy present.   Neurological: She is alert and oriented to person, place, and time. No cranial nerve deficit.   Skin: Skin is warm and dry.   Psychiatric: Mood, affect and judgment normal.   Vitals reviewed.       Labs:    Imaging: I have personally reviewed the patient's available images and reports and summarized pertinent findings above in HPI.     Pathology: I have personally reviewed the patient's available pathology and summarized pertinent findings above in HPI.       - Thank you for allowing me to participate in the care of your patient.    Harvinder Lara MD, PhD

## 2020-11-03 ENCOUNTER — PATIENT MESSAGE (OUTPATIENT)
Dept: SURGERY | Facility: CLINIC | Age: 65
End: 2020-11-03

## 2020-11-03 NOTE — NURSING
Oncology Navigation   Intake  Cancer Type: Thoracic  Internal / External Referral: Internal  Referral Source: Dr. Avina  Date of Referral: 10/14/20  Initial Nurse Navigator Contact: 10/14/20  Referral to Initial Contact Timeline (days): 0  First Appointment Available: 20  Appointment Date: 20  First Available Date vs. Scheduled Date (days): 0  Multiple appointments: Yes     Treatment  Current Status: Active    Surgical Oncologist: Dr. Tucker  Type of Surgery: Right Robot-Assisted Thoracoscopic Lower Lobectomy with MLND, possible lower bilobectom  Consult Date: 10/06/20  Surgery Schedule Date: 10/23/20    Medical Oncologist: Fabrice  Consult Date: 20  Chemotherapy: Planned    Radiation Therapy: Planned  Radiation Oncologist: Dr. Lara (Chemo/RT)  Total cGy: 60  Total Treatments: 30  Trial Information: Protocol: Randomized Phase III Trial of [id number] (durvalumab) as Concurrent and Consolidative Therapy or Consolidative Therapy Alone for Unresectable Stage 3 NSCLC     Radiation Oncologist: Dr. Laar (Chemo/RT)     Acuity  Stage: 2  Treatment Tolerability: Has not started treatment yet/treatment fully completed and side effects resolved  ECO  Comorbidities in Medical History: 1  Hospitalization Within the Past Month: 2   Needed: 0  Support: 0  Verbalizes Financial Concerns: 0  Transportation: 0  Mental Health: PHQ Score: 0  History of noncompliance/frequent no shows and cancellations: 0  Verbalizes the need for more education: 0  Other Factors (+1 for Each): 0  Navigation Acuity: 6     Follow Up  No follow-ups on file.

## 2020-11-04 DIAGNOSIS — Z12.11 COLON CANCER SCREENING: ICD-10-CM

## 2020-11-05 ENCOUNTER — PATIENT MESSAGE (OUTPATIENT)
Dept: FAMILY MEDICINE | Facility: CLINIC | Age: 65
End: 2020-11-05

## 2020-11-05 ENCOUNTER — PATIENT OUTREACH (OUTPATIENT)
Dept: ADMINISTRATIVE | Facility: OTHER | Age: 65
End: 2020-11-05

## 2020-11-05 NOTE — PROGRESS NOTES
LINKS immunization registry not responding  Care Everywhere updated  Health Maintenance updated  Chart reviewed for overdue Proactive [facility] Encounters ([facility]) health maintenance testing (CRS, Breast Ca, Diabetic Eye Exam)   Orders entered:N/A  
Growth in "nostril", Congestion

## 2020-11-06 ENCOUNTER — RESEARCH ENCOUNTER (OUTPATIENT)
Dept: RESEARCH | Facility: HOSPITAL | Age: 65
End: 2020-11-06

## 2020-11-06 ENCOUNTER — HOSPITAL ENCOUNTER (OUTPATIENT)
Dept: RADIATION THERAPY | Facility: HOSPITAL | Age: 65
Discharge: HOME | End: 2020-11-06
Attending: RADIOLOGY
Payer: MEDICARE

## 2020-11-06 ENCOUNTER — OFFICE VISIT (OUTPATIENT)
Dept: SURGERY | Facility: CLINIC | Age: 65
End: 2020-11-06
Payer: MEDICARE

## 2020-11-06 VITALS
HEIGHT: 62 IN | DIASTOLIC BLOOD PRESSURE: 74 MMHG | BODY MASS INDEX: 35.76 KG/M2 | SYSTOLIC BLOOD PRESSURE: 158 MMHG | HEART RATE: 89 BPM | TEMPERATURE: 97 F | WEIGHT: 194.31 LBS

## 2020-11-06 DIAGNOSIS — Z00.6 EXAMINATION OF PARTICIPANT IN CLINICAL TRIAL: ICD-10-CM

## 2020-11-06 DIAGNOSIS — C34.91 NSCLC OF RIGHT LUNG: Primary | ICD-10-CM

## 2020-11-06 DIAGNOSIS — Z13.9 SCREENING PROCEDURE: ICD-10-CM

## 2020-11-06 DIAGNOSIS — R68.89 OTHER GENERAL SYMPTOMS AND SIGNS: ICD-10-CM

## 2020-11-06 PROCEDURE — 99999 PR PBB SHADOW E&M-EST. PATIENT-LVL V: CPT | Mod: PBBFAC,HCNC,, | Performed by: STUDENT IN AN ORGANIZED HEALTH CARE EDUCATION/TRAINING PROGRAM

## 2020-11-06 PROCEDURE — 99203 OFFICE O/P NEW LOW 30 MIN: CPT | Mod: HCNC,S$GLB,, | Performed by: STUDENT IN AN ORGANIZED HEALTH CARE EDUCATION/TRAINING PROGRAM

## 2020-11-06 PROCEDURE — 3078F DIAST BP <80 MM HG: CPT | Mod: HCNC,CPTII,S$GLB, | Performed by: STUDENT IN AN ORGANIZED HEALTH CARE EDUCATION/TRAINING PROGRAM

## 2020-11-06 PROCEDURE — 77290 PR  SET RADN THERAPY FIELD COMPLEX: ICD-10-PCS | Mod: 26,HCNC,, | Performed by: RADIOLOGY

## 2020-11-06 PROCEDURE — 77263 THER RADIOLOGY TX PLNG CPLX: CPT | Mod: HCNC,,, | Performed by: RADIOLOGY

## 2020-11-06 PROCEDURE — 3077F PR MOST RECENT SYSTOLIC BLOOD PRESSURE >= 140 MM HG: ICD-10-PCS | Mod: HCNC,CPTII,S$GLB, | Performed by: STUDENT IN AN ORGANIZED HEALTH CARE EDUCATION/TRAINING PROGRAM

## 2020-11-06 PROCEDURE — 77290 THER RAD SIMULAJ FIELD CPLX: CPT | Mod: TC,HCNC | Performed by: RADIOLOGY

## 2020-11-06 PROCEDURE — 3008F PR BODY MASS INDEX (BMI) DOCUMENTED: ICD-10-PCS | Mod: HCNC,CPTII,S$GLB, | Performed by: STUDENT IN AN ORGANIZED HEALTH CARE EDUCATION/TRAINING PROGRAM

## 2020-11-06 PROCEDURE — 77290 THER RAD SIMULAJ FIELD CPLX: CPT | Mod: 26,HCNC,, | Performed by: RADIOLOGY

## 2020-11-06 PROCEDURE — 99999 PR PBB SHADOW E&M-EST. PATIENT-LVL V: ICD-10-PCS | Mod: PBBFAC,HCNC,, | Performed by: STUDENT IN AN ORGANIZED HEALTH CARE EDUCATION/TRAINING PROGRAM

## 2020-11-06 PROCEDURE — 77263 PR  RADIATION THERAPY PLAN COMPLEX: ICD-10-PCS | Mod: HCNC,,, | Performed by: RADIOLOGY

## 2020-11-06 PROCEDURE — 77334 PR  RADN TREATMENT AID(S) COMPLX: ICD-10-PCS | Mod: 26,HCNC,, | Performed by: RADIOLOGY

## 2020-11-06 PROCEDURE — 3078F PR MOST RECENT DIASTOLIC BLOOD PRESSURE < 80 MM HG: ICD-10-PCS | Mod: HCNC,CPTII,S$GLB, | Performed by: STUDENT IN AN ORGANIZED HEALTH CARE EDUCATION/TRAINING PROGRAM

## 2020-11-06 PROCEDURE — 3077F SYST BP >= 140 MM HG: CPT | Mod: HCNC,CPTII,S$GLB, | Performed by: STUDENT IN AN ORGANIZED HEALTH CARE EDUCATION/TRAINING PROGRAM

## 2020-11-06 PROCEDURE — 77014 HC CT GUIDANCE RADIATION THERAPY FLDS PLACEMENT: CPT | Mod: TC,HCNC | Performed by: RADIOLOGY

## 2020-11-06 PROCEDURE — 3008F BODY MASS INDEX DOCD: CPT | Mod: HCNC,CPTII,S$GLB, | Performed by: STUDENT IN AN ORGANIZED HEALTH CARE EDUCATION/TRAINING PROGRAM

## 2020-11-06 PROCEDURE — 77334 RADIATION TREATMENT AID(S): CPT | Mod: TC,HCNC | Performed by: RADIOLOGY

## 2020-11-06 PROCEDURE — 77334 RADIATION TREATMENT AID(S): CPT | Mod: 26,HCNC,, | Performed by: RADIOLOGY

## 2020-11-06 PROCEDURE — 99203 PR OFFICE/OUTPT VISIT, NEW, LEVL III, 30-44 MIN: ICD-10-PCS | Mod: HCNC,S$GLB,, | Performed by: STUDENT IN AN ORGANIZED HEALTH CARE EDUCATION/TRAINING PROGRAM

## 2020-11-06 RX ORDER — SODIUM CHLORIDE 9 MG/ML
INJECTION, SOLUTION INTRAVENOUS CONTINUOUS
Status: CANCELLED | OUTPATIENT
Start: 2020-11-06

## 2020-11-06 NOTE — LETTER
November 6, 2020      Javi Avina MD  [address]  [facility] [address]           [facility]  [address]  [facility] [address]  Phone: [phone]          Patient: Hannah Montoya   MR Number: [id number]   YOB: 1955   Date of Visit: 11/6/2020       Dear Dr. Javi Avina:    Thank you for referring Hannah Montoya to me for evaluation. Attached you will find relevant portions of my assessment and plan of care.    If you have questions, please do not hesitate to call me. I look forward to following Hannah Montoya along with you.    Sincerely,    Josefa Caceres MD    Enclosure  CC:  No Recipients    If you would like to receive this communication electronically, please contact [email or web address] or [phone] to request more information on [employer] Link access.    For providers and/or their staff who would like to refer a patient to [facility], please contact us through our one-stop-shop provider referral line, [facility], at [phone].    If you feel you have received this communication in error or would no longer like to receive these types of communications, please e-mail [email or web address]

## 2020-11-06 NOTE — PROGRESS NOTES
Protocol: Randomized Phase III Trial of [id number] (durvalumab) as Concurrent and Consolidative Therapy or Consolidative Therapy Alone for Unresectable Stage 3 NSCLC   Protocol # [id number]          IRB # 2020.326  PI: Carmen Akhtar MD  Treating Physician: Javi Avina MD    November 6, 2020     Informed Consent     Met with patient today prior to her radiation simulation. She was given an copy of the ICF at her last visit to take home and review per her request. She reviewed the ICF in full at home prior to our meeting. She has a very good understanding of the trial. Opportunity for questions provided and patient denies having any. Patient signed consent prior to any study related procedures. Copy of signed ICF given to patient.     Medical history and concomitant medications reviewed. Screening process and tentative plan discussed. Patient will have baseline CT of the chest tomorrow and all other outstanding screening assessments will be completed on Monday 11/9/2020. Pemat voiced understanding.      A copy of the sign ICF can be found attached to this encounter.

## 2020-11-06 NOTE — H&P (VIEW-ONLY)
[facility]  General Surgery  History & Physical  Date: 11/06/2020  Referring Provider: Javi Avina    SUBJECTIVE:     Chief complaint:   Chief Complaint   Patient presents with    Consult       History of Present Illness:  Patient is a 65 y.o. female with PMH HTN, DM2 (on metformin), LBBB (pacemaker) and newly diagnosed stage 3A NSCLC of the right lung presents to clinic today for port placement. She is not an anticoagulation and denies a history of MI or CVA. She has never had prior central venous access.  Patient presented with SOB in March. COVID negative however noted RLL mass. Treated with ABX. Repeat May 2020 with persistent mass. CT guided biopsy non diagnostic of malignancy. Repeat CT in sept 2020 with interval enlargement. PET with SUV 22 of RLL mass and right hilar SUV 9. She had an EBUS 10/13 and 11R LN biopsy revealed SCC. Plan was for robotic resection of mass. Hilar LN of concern on PET and positive on FNA was densely adherent to basilar segment and middle lobe arterial branches, mandating lower bilobectomy therefore resection was aborted. She will undergo [id number] clinical trial, a randomized control trial evaluating combination chemoRT + durvalumab followed by maintenance vs. SOC chemoRT -> maintenance. She was sent to the General Surgery clinic for evaluation of port placement for chemotherapy.    Review of patient's allergies indicates:   Allergen Reactions    Adhesive Rash     tegaderm burns and blistered skin       Current Outpatient Medications   Medication Sig Dispense Refill    albuterol sulfate (PROAIR RESPICLICK) 90 mcg/actuation AePB Inhale 2 puffs into the lungs every 4 (four) hours as needed. Rescue 1 each 5    albuterol-ipratropium (DUO-NEB) 2.5 mg-0.5 mg/3 mL nebulizer solution Take 3 mLs by nebulization every 6 (six) hours while awake. Rescue 30 mL 5    atorvastatin (LIPITOR) 10 MG tablet Take 1 tablet (10 mg total) by mouth once daily. (Patient taking  differently: Take 10 mg by mouth every evening. ) 90 tablet 3    buPROPion (WELLBUTRIN XL) 150 MG TB24 tablet TAKE 1 TABLET BY MOUTH EVERY DAY 90 tablet 1    carvediloL (COREG) 25 MG tablet TAKE 1 TABLET(25 MG) BY MOUTH TWICE DAILY 180 tablet 3    cetirizine (ZYRTEC) 10 MG tablet Take 10 mg by mouth every evening.       fluticasone-salmeterol diskus inhaler 250-50 mcg INHALE 1 PUFF INTO THE LUNGS 2 (TWO) TIMES DAILY. (Patient taking differently: INHALE 1 PUFF INTO THE LUNGS 2 (TWO) TIMES DAILY.) 60 each 5    furosemide (LASIX) 40 MG tablet Take 1 tablet (40 mg total) by mouth once daily. TAKE 1 TABLET(40 MG) BY MOUTH TWICE DAILY 90 tablet 1    metFORMIN (GLUCOPHAGE) 500 MG tablet Take 1 tablet (500 mg total) by mouth 2 (two) times daily. 180 tablet 1    multivitamin (ONE DAILY MULTIVITAMIN) per tablet Take 1 tablet by mouth once daily.      oxyCODONE (ROXICODONE) 5 MG immediate release tablet Take 1 tablet (5 mg total) by mouth every 4 (four) hours as needed for Pain. 30 tablet 0    sertraline (ZOLOFT) 100 MG tablet Take 1 tablet (100 mg total) by mouth every evening. 90 tablet 1    SPIRIVA WITH HANDIHALER 18 mcg inhalation capsule INHALE THE CONTENTS OF 1 CAPSULE EVERY DAY 90 capsule 0    TRUETEST TEST STRIPS Strp test once EVERY MORNING 100 strip 1    lisinopril 10 MG tablet Take 1 tablet (10 mg total) by mouth once daily. (Patient not taking: Reported on 10/1/2020) 90 tablet 3     No current facility-administered medications for this visit.        Past Medical History:   Diagnosis Date    AICD (automatic cardioverter/defibrillator) present     Asthma     bronchitis in past    Breast cancer 2016    right    Cardiac pacemaker     Cardiomyopathy     COPD (chronic obstructive pulmonary disease)     Diabetes mellitus, type 2     Hyperglycemia     Hyperlipidemia     Hypertension     Malignant neoplasm of overlapping sites of female breast 2/12/2016    Nuclear sclerosis of both eyes 8/12/2020     Respiratory distress 3/12/2020     Past Surgical History:   Procedure Laterality Date    BREAST BIOPSY Right 2016    [facility]    BREAST LUMPECTOMY Right     CARDIAC CATHETERIZATION Bilateral 2017    CARDIAC DEFIBRILLATOR PLACEMENT Left 08/10/2017    CARDIAC DEFIBRILLATOR PLACEMENT Left 2018     SECTION      x2    CHOLECYSTECTOMY      LUNG BIOPSY N/A 2020    Procedure: BIOPSY, LUNG;  Surgeon: Kalpesh Diagnostic Provider;  Location: [facility];  Service: Radiology;  Laterality: N/A;  8AM START  RN PREOP 2020---COVID NEGATIVE    NAVIGATIONAL BRONCHOSCOPY N/A 10/13/2020    Procedure: BRONCHOSCOPY, NAVIGATIONAL;  Surgeon: Jamilah Weaver MD;  Location: [address];  Service: Pulmonary;  Laterality: N/A;    REVISION OF IMPLANTABLE CARDIOVERTER-DEFIBRILLATOR (ICD) ELECTRODE LEAD PLACEMENT N/A 6/15/2018    Procedure: REVISION-LEAD-ICD;  Surgeon: Javy Gates MD;  Location: [facility] CATH LAB;  Service: Cardiology;  Laterality: N/A;  LBBB, Bi-V ICD HIS Ld rev, MDT, Choice, MB, 3 Prep    ROBOT-ASSISTED LAPAROSCOPIC LYMPHADENECTOMY USING DA SALVADOR XI Right 10/23/2020    Procedure: XI ROBOTIC LYMPHADENECTOMY;  Surgeon: Ramo Tucker MD;  Location: [facility] OR [address] [place] [address];  Service: Thoracic;  Laterality: Right;    TUBAL LIGATION       Family History   Problem Relation Age of Onset    Kidney disease Mother     Cataracts Mother     Kidney disease Father     Cataracts Father     Clotting disorder Brother     No Known Problems Daughter     No Known Problems Son     No Known Problems Sister     No Known Problems Maternal Aunt     No Known Problems Maternal Uncle     No Known Problems Paternal Aunt     No Known Problems Paternal Uncle     Macular degeneration Maternal Grandmother     No Known Problems Maternal Grandfather     No Known Problems Paternal Grandmother     No Known Problems Paternal Grandfather     Breast cancer Neg Hx     Ovarian cancer Neg Hx     Amblyopia Neg Hx      "Blindness Neg Hx     Cancer Neg Hx     Diabetes Neg Hx     Glaucoma Neg Hx     Hypertension Neg Hx     Retinal detachment Neg Hx     Strabismus Neg Hx     Stroke Neg Hx     Thyroid disease Neg Hx      Social History     Tobacco Use    Smoking status: Former Smoker     Packs/day: 0.50     Years: 40.00     Pack years: 20.00     Types: Cigarettes     Quit date: 2016     Years since quittin.2    Smokeless tobacco: Never Used   Substance Use Topics    Alcohol use: Yes     Frequency: Monthly or less     Drinks per session: 1 or 2     Binge frequency: Never     Comment: rare- holiday    Drug use: No        Review of Systems:  Review of Systems   Constitutional: Negative.  Negative for activity change.   Eyes: Negative.  Negative for discharge and itching.   Respiratory: Positive for cough and shortness of breath. Negative for apnea, choking and chest tightness.    Cardiovascular: Negative for chest pain and palpitations.   Gastrointestinal: Negative for abdominal distention and abdominal pain.   Endocrine: Negative for cold intolerance and heat intolerance.   Genitourinary: Negative for difficulty urinating and dyspareunia.   Musculoskeletal: Negative for arthralgias and back pain.   Skin: Negative for color change and pallor.       OBJECTIVE:     Vital Signs (Most Recent)  Temp: 97 °F (36.1 °C) (20 1101)  Pulse: 89 (20 1101)  BP: (!) 158/74 (20 1101)  5' 2" (1.575 m)  88.2 kg (194 lb 5.4 oz)     Physical Exam:  Physical Exam  Vitals signs and nursing note reviewed.   Constitutional:       General: She is not in acute distress.     Appearance: Normal appearance. She is not ill-appearing or toxic-appearing.   HENT:      Head: Normocephalic and atraumatic.   Eyes:      General: Vision grossly intact.      Extraocular Movements: Extraocular movements intact.   Neck:      Musculoskeletal: Normal range of motion and neck supple. No muscular tenderness.   Cardiovascular:      Rate and " Rhythm: Normal rate and regular rhythm.      Pulses: Normal pulses.      Heart sounds: Normal heart sounds. No murmur.      Comments: Pacemaker on left chest  Pulmonary:      Effort: Pulmonary effort is normal. No respiratory distress.      Breath sounds: Normal breath sounds.   Chest:      Comments: Pacemaker on left upper chest. Incision is well healed.  Abdominal:      General: Abdomen is flat. Bowel sounds are normal. There is no distension.      Palpations: Abdomen is soft. There is no fluid wave.      Tenderness: There is no abdominal tenderness.   Musculoskeletal:         General: No swelling, tenderness or deformity.   Lymphadenopathy:      Upper Body:      Right upper body: No supraclavicular adenopathy.      Left upper body: No supraclavicular adenopathy.   Skin:     General: Skin is warm and dry.      Coloration: Skin is not cyanotic or jaundiced.   Neurological:      General: No focal deficit present.      Mental Status: She is alert and oriented to person, place, and time.      Cranial Nerves: Cranial nerves are intact.      Sensory: Sensation is intact.   Psychiatric:         Behavior: Behavior is cooperative.         Laboratory  CBC: Reviewed  BMP: Reviewed    Diagnostic Results:  Labs: Reviewed  ECG: Reviewed    ASSESSMENT/PLAN:   Hannah Montoya with non-small cell squamous lung cancer, referred for port placement, will schedule for 9/11/20.     PLAN:  - Right port-a-cath placement on 11/11/20; informed consent obtained today. Discussed risks including but not limited to bleeding, wound infection, cardiac arrhythmias, pneumothorax, hemothorax and injuries to surrounding tissues. Going on the right side given that she has a pacemaker on the left side.   - Preop orders placed      Josefa Caceres MD  General Surgery and Surgical Critical Care  [facility]

## 2020-11-06 NOTE — PROGRESS NOTES
[facility]  General Surgery  History & Physical  Date: 11/06/2020  Referring Provider: Javi Avina    SUBJECTIVE:     Chief complaint:   Chief Complaint   Patient presents with    Consult       History of Present Illness:  Patient is a 65 y.o. female with PMH HTN, DM2 (on metformin), LBBB (pacemaker) and newly diagnosed stage 3A NSCLC of the right lung presents to clinic today for port placement. She is not an anticoagulation and denies a history of MI or CVA. She has never had prior central venous access.  Patient presented with SOB in March. COVID negative however noted RLL mass. Treated with ABX. Repeat May 2020 with persistent mass. CT guided biopsy non diagnostic of malignancy. Repeat CT in sept 2020 with interval enlargement. PET with SUV 22 of RLL mass and right hilar SUV 9. She had an EBUS 10/13 and 11R LN biopsy revealed SCC. Plan was for robotic resection of mass. Hilar LN of concern on PET and positive on FNA was densely adherent to basilar segment and middle lobe arterial branches, mandating lower bilobectomy therefore resection was aborted. She will undergo [id number] clinical trial, a randomized control trial evaluating combination chemoRT + durvalumab followed by maintenance vs. SOC chemoRT -> maintenance. She was sent to the General Surgery clinic for evaluation of port placement for chemotherapy.    Review of patient's allergies indicates:   Allergen Reactions    Adhesive Rash     tegaderm burns and blistered skin       Current Outpatient Medications   Medication Sig Dispense Refill    albuterol sulfate (PROAIR RESPICLICK) 90 mcg/actuation AePB Inhale 2 puffs into the lungs every 4 (four) hours as needed. Rescue 1 each 5    albuterol-ipratropium (DUO-NEB) 2.5 mg-0.5 mg/3 mL nebulizer solution Take 3 mLs by nebulization every 6 (six) hours while awake. Rescue 30 mL 5    atorvastatin (LIPITOR) 10 MG tablet Take 1 tablet (10 mg total) by mouth once daily. (Patient taking  differently: Take 10 mg by mouth every evening. ) 90 tablet 3    buPROPion (WELLBUTRIN XL) 150 MG TB24 tablet TAKE 1 TABLET BY MOUTH EVERY DAY 90 tablet 1    carvediloL (COREG) 25 MG tablet TAKE 1 TABLET(25 MG) BY MOUTH TWICE DAILY 180 tablet 3    cetirizine (ZYRTEC) 10 MG tablet Take 10 mg by mouth every evening.       fluticasone-salmeterol diskus inhaler 250-50 mcg INHALE 1 PUFF INTO THE LUNGS 2 (TWO) TIMES DAILY. (Patient taking differently: INHALE 1 PUFF INTO THE LUNGS 2 (TWO) TIMES DAILY.) 60 each 5    furosemide (LASIX) 40 MG tablet Take 1 tablet (40 mg total) by mouth once daily. TAKE 1 TABLET(40 MG) BY MOUTH TWICE DAILY 90 tablet 1    metFORMIN (GLUCOPHAGE) 500 MG tablet Take 1 tablet (500 mg total) by mouth 2 (two) times daily. 180 tablet 1    multivitamin (ONE DAILY MULTIVITAMIN) per tablet Take 1 tablet by mouth once daily.      oxyCODONE (ROXICODONE) 5 MG immediate release tablet Take 1 tablet (5 mg total) by mouth every 4 (four) hours as needed for Pain. 30 tablet 0    sertraline (ZOLOFT) 100 MG tablet Take 1 tablet (100 mg total) by mouth every evening. 90 tablet 1    SPIRIVA WITH HANDIHALER 18 mcg inhalation capsule INHALE THE CONTENTS OF 1 CAPSULE EVERY DAY 90 capsule 0    TRUETEST TEST STRIPS Strp test once EVERY MORNING 100 strip 1    lisinopril 10 MG tablet Take 1 tablet (10 mg total) by mouth once daily. (Patient not taking: Reported on 10/1/2020) 90 tablet 3     No current facility-administered medications for this visit.        Past Medical History:   Diagnosis Date    AICD (automatic cardioverter/defibrillator) present     Asthma     bronchitis in past    Breast cancer 2016    right    Cardiac pacemaker     Cardiomyopathy     COPD (chronic obstructive pulmonary disease)     Diabetes mellitus, type 2     Hyperglycemia     Hyperlipidemia     Hypertension     Malignant neoplasm of overlapping sites of female breast 2/12/2016    Nuclear sclerosis of both eyes 8/12/2020     Respiratory distress 3/12/2020     Past Surgical History:   Procedure Laterality Date    BREAST BIOPSY Right 2016    IDC    BREAST LUMPECTOMY Right     CARDIAC CATHETERIZATION Bilateral 2017    CARDIAC DEFIBRILLATOR PLACEMENT Left 08/10/2017    CARDIAC DEFIBRILLATOR PLACEMENT Left 2018     SECTION      x2    CHOLECYSTECTOMY      LUNG BIOPSY N/A 2020    Procedure: BIOPSY, LUNG;  Surgeon: Kalpesh Diagnostic Provider;  Location: [facility];  Service: Radiology;  Laterality: N/A;  8AM START  RN PREOP 2020---COVID NEGATIVE    NAVIGATIONAL BRONCHOSCOPY N/A 10/13/2020    Procedure: BRONCHOSCOPY, NAVIGATIONAL;  Surgeon: Jamilah Weaver MD;  Location: [address];  Service: Pulmonary;  Laterality: N/A;    REVISION OF IMPLANTABLE CARDIOVERTER-DEFIBRILLATOR (ICD) ELECTRODE LEAD PLACEMENT N/A 6/15/2018    Procedure: REVISION-LEAD-ICD;  Surgeon: Javy Gates MD;  Location: [facility] CATH LAB;  Service: Cardiology;  Laterality: N/A;  LBBB, Bi-V ICD HIS Ld rev, MDT, Choice, MB, 3 Prep    ROBOT-ASSISTED LAPAROSCOPIC LYMPHADENECTOMY USING DA SALVADOR XI Right 10/23/2020    Procedure: XI ROBOTIC LYMPHADENECTOMY;  Surgeon: Ramo Tucker MD;  Location: [facility] OR [address] [place] [address];  Service: Thoracic;  Laterality: Right;    TUBAL LIGATION       Family History   Problem Relation Age of Onset    Kidney disease Mother     Cataracts Mother     Kidney disease Father     Cataracts Father     Clotting disorder Brother     No Known Problems Daughter     No Known Problems Son     No Known Problems Sister     No Known Problems Maternal Aunt     No Known Problems Maternal Uncle     No Known Problems Paternal Aunt     No Known Problems Paternal Uncle     Macular degeneration Maternal Grandmother     No Known Problems Maternal Grandfather     No Known Problems Paternal Grandmother     No Known Problems Paternal Grandfather     Breast cancer Neg Hx     Ovarian cancer Neg Hx     Amblyopia Neg Hx      "Blindness Neg Hx     Cancer Neg Hx     Diabetes Neg Hx     Glaucoma Neg Hx     Hypertension Neg Hx     Retinal detachment Neg Hx     Strabismus Neg Hx     Stroke Neg Hx     Thyroid disease Neg Hx      Social History     Tobacco Use    Smoking status: Former Smoker     Packs/day: 0.50     Years: 40.00     Pack years: 20.00     Types: Cigarettes     Quit date: 2016     Years since quittin.2    Smokeless tobacco: Never Used   Substance Use Topics    Alcohol use: Yes     Frequency: Monthly or less     Drinks per session: 1 or 2     Binge frequency: Never     Comment: rare- holiday    Drug use: No        Review of Systems:  Review of Systems   Constitutional: Negative.  Negative for activity change.   Eyes: Negative.  Negative for discharge and itching.   Respiratory: Positive for cough and shortness of breath. Negative for apnea, choking and chest tightness.    Cardiovascular: Negative for chest pain and palpitations.   Gastrointestinal: Negative for abdominal distention and abdominal pain.   Endocrine: Negative for cold intolerance and heat intolerance.   Genitourinary: Negative for difficulty urinating and dyspareunia.   Musculoskeletal: Negative for arthralgias and back pain.   Skin: Negative for color change and pallor.       OBJECTIVE:     Vital Signs (Most Recent)  Temp: 97 °F (36.1 °C) (20 1101)  Pulse: 89 (20 1101)  BP: (!) 158/74 (20 1101)  5' 2" (1.575 m)  88.2 kg (194 lb 5.4 oz)     Physical Exam:  Physical Exam  Vitals signs and nursing note reviewed.   Constitutional:       General: She is not in acute distress.     Appearance: Normal appearance. She is not ill-appearing or toxic-appearing.   HENT:      Head: Normocephalic and atraumatic.   Eyes:      General: Vision grossly intact.      Extraocular Movements: Extraocular movements intact.   Neck:      Musculoskeletal: Normal range of motion and neck supple. No muscular tenderness.   Cardiovascular:      Rate and " Rhythm: Normal rate and regular rhythm.      Pulses: Normal pulses.      Heart sounds: Normal heart sounds. No murmur.      Comments: Pacemaker on left chest  Pulmonary:      Effort: Pulmonary effort is normal. No respiratory distress.      Breath sounds: Normal breath sounds.   Chest:      Comments: Pacemaker on left upper chest. Incision is well healed.  Abdominal:      General: Abdomen is flat. Bowel sounds are normal. There is no distension.      Palpations: Abdomen is soft. There is no fluid wave.      Tenderness: There is no abdominal tenderness.   Musculoskeletal:         General: No swelling, tenderness or deformity.   Lymphadenopathy:      Upper Body:      Right upper body: No supraclavicular adenopathy.      Left upper body: No supraclavicular adenopathy.   Skin:     General: Skin is warm and dry.      Coloration: Skin is not cyanotic or jaundiced.   Neurological:      General: No focal deficit present.      Mental Status: She is alert and oriented to person, place, and time.      Cranial Nerves: Cranial nerves are intact.      Sensory: Sensation is intact.   Psychiatric:         Behavior: Behavior is cooperative.         Laboratory  CBC: Reviewed  BMP: Reviewed    Diagnostic Results:  Labs: Reviewed  ECG: Reviewed    ASSESSMENT/PLAN:   Hannah Montoya with non-small cell squamous lung cancer, referred for port placement, will schedule for 9/11/20.     PLAN:  - Right port-a-cath placement on 11/11/20; informed consent obtained today. Discussed risks including but not limited to bleeding, wound infection, cardiac arrhythmias, pneumothorax, hemothorax and injuries to surrounding tissues. Going on the right side given that she has a pacemaker on the left side.   - Preop orders placed      Josefa Caceres MD  General Surgery and Surgical Critical Care  [facility]

## 2020-11-07 ENCOUNTER — HOSPITAL ENCOUNTER (OUTPATIENT)
Dept: RADIOLOGY | Facility: HOSPITAL | Age: 65
Discharge: HOME | End: 2020-11-07
Attending: INTERNAL MEDICINE
Payer: MEDICARE

## 2020-11-07 DIAGNOSIS — Z00.6 EXAMINATION OF PARTICIPANT IN CLINICAL TRIAL: ICD-10-CM

## 2020-11-07 DIAGNOSIS — C34.91 NSCLC OF RIGHT LUNG: ICD-10-CM

## 2020-11-07 PROCEDURE — 71260 CT THORAX DX C+: CPT | Mod: TC,HCNC,Q1

## 2020-11-07 PROCEDURE — 71260 CT THORAX DX C+: CPT | Mod: 26,HCNC,, | Performed by: RADIOLOGY

## 2020-11-07 PROCEDURE — 25500020 PHARM REV CODE 255: Mod: HCNC,Q1 | Performed by: INTERNAL MEDICINE

## 2020-11-07 PROCEDURE — 71260 CT CHEST WITH CONTRAST: ICD-10-PCS | Mod: 26,HCNC,, | Performed by: RADIOLOGY

## 2020-11-07 RX ADMIN — IOHEXOL 100 ML: 350 INJECTION, SOLUTION INTRAVENOUS at 02:11

## 2020-11-09 ENCOUNTER — LAB VISIT (OUTPATIENT)
Dept: LAB | Facility: HOSPITAL | Age: 65
End: 2020-11-09
Payer: MEDICARE

## 2020-11-09 ENCOUNTER — HOSPITAL ENCOUNTER (OUTPATIENT)
Dept: CARDIOLOGY | Facility: CLINIC | Age: 65
Discharge: HOME | End: 2020-11-09
Payer: MEDICARE

## 2020-11-09 ENCOUNTER — RESEARCH ENCOUNTER (OUTPATIENT)
Dept: RESEARCH | Facility: HOSPITAL | Age: 65
End: 2020-11-09

## 2020-11-09 ENCOUNTER — CLINICAL SUPPORT (OUTPATIENT)
Dept: HEMATOLOGY/ONCOLOGY | Facility: CLINIC | Age: 65
End: 2020-11-09
Payer: MEDICARE

## 2020-11-09 ENCOUNTER — OFFICE VISIT (OUTPATIENT)
Dept: HEMATOLOGY/ONCOLOGY | Facility: CLINIC | Age: 65
End: 2020-11-09
Payer: MEDICARE

## 2020-11-09 VITALS
HEART RATE: 86 BPM | BODY MASS INDEX: 35.38 KG/M2 | RESPIRATION RATE: 16 BRPM | TEMPERATURE: 98 F | SYSTOLIC BLOOD PRESSURE: 126 MMHG | WEIGHT: 192.25 LBS | OXYGEN SATURATION: 96 % | HEIGHT: 62 IN | DIASTOLIC BLOOD PRESSURE: 64 MMHG

## 2020-11-09 DIAGNOSIS — I42.0 DILATED CARDIOMYOPATHY: ICD-10-CM

## 2020-11-09 DIAGNOSIS — I10 ESSENTIAL HYPERTENSION: ICD-10-CM

## 2020-11-09 DIAGNOSIS — C34.91 NSCLC OF RIGHT LUNG: Primary | ICD-10-CM

## 2020-11-09 DIAGNOSIS — I44.7 LEFT BUNDLE-BRANCH BLOCK: ICD-10-CM

## 2020-11-09 DIAGNOSIS — Z51.11 ENCOUNTER FOR ANTINEOPLASTIC CHEMOTHERAPY: ICD-10-CM

## 2020-11-09 DIAGNOSIS — R68.89 OTHER GENERAL SYMPTOMS AND SIGNS: ICD-10-CM

## 2020-11-09 DIAGNOSIS — Z00.6 EXAMINATION OF PARTICIPANT IN CLINICAL TRIAL: ICD-10-CM

## 2020-11-09 DIAGNOSIS — Z13.9 ENCOUNTER FOR SCREENING: Primary | ICD-10-CM

## 2020-11-09 DIAGNOSIS — C34.91 NSCLC OF RIGHT LUNG: ICD-10-CM

## 2020-11-09 DIAGNOSIS — Z00.6 CLINICAL TRIAL PARTICIPANT: Primary | ICD-10-CM

## 2020-11-09 DIAGNOSIS — R11.0 CHEMOTHERAPY-INDUCED NAUSEA: ICD-10-CM

## 2020-11-09 DIAGNOSIS — Z00.6 CLINICAL TRIAL PARTICIPANT: ICD-10-CM

## 2020-11-09 DIAGNOSIS — Z85.3 HISTORY OF BREAST CANCER IN FEMALE: ICD-10-CM

## 2020-11-09 DIAGNOSIS — Z95.810 CARDIAC RESYNCHRONIZATION THERAPY DEFIBRILLATOR (CRT-D) IN PLACE: ICD-10-CM

## 2020-11-09 DIAGNOSIS — T45.1X5A CHEMOTHERAPY-INDUCED NAUSEA: ICD-10-CM

## 2020-11-09 DIAGNOSIS — J41.0 SIMPLE CHRONIC BRONCHITIS: ICD-10-CM

## 2020-11-09 DIAGNOSIS — E11.9 TYPE 2 DIABETES MELLITUS WITHOUT COMPLICATION, WITHOUT LONG-TERM CURRENT USE OF INSULIN: ICD-10-CM

## 2020-11-09 LAB
ALBUMIN SERPL BCP-MCNC: 3.5 G/DL (ref 3.5–5.2)
ALP SERPL-CCNC: 60 U/L (ref 55–135)
ALT SERPL W/O P-5'-P-CCNC: 20 U/L (ref 10–44)
ANION GAP SERPL CALC-SCNC: 12 MMOL/L (ref 8–16)
APTT BLDCRRT: 29.1 SEC (ref 21–32)
AST SERPL-CCNC: 23 U/L (ref 10–40)
BASOPHILS # BLD AUTO: 0.06 K/UL (ref 0–0.2)
BASOPHILS NFR BLD: 0.9 % (ref 0–1.9)
BILIRUB SERPL-MCNC: 0.4 MG/DL (ref 0.1–1)
BUN SERPL-MCNC: 18 MG/DL (ref 8–23)
CALCIUM SERPL-MCNC: 9.3 MG/DL (ref 8.7–10.5)
CHLORIDE SERPL-SCNC: 102 MMOL/L (ref 95–110)
CO2 SERPL-SCNC: 27 MMOL/L (ref 23–29)
CREAT SERPL-MCNC: 1 MG/DL (ref 0.5–1.4)
DIFFERENTIAL METHOD: ABNORMAL
EOSINOPHIL # BLD AUTO: 0.1 K/UL (ref 0–0.5)
EOSINOPHIL NFR BLD: 2.2 % (ref 0–8)
ERYTHROCYTE [DISTWIDTH] IN BLOOD BY AUTOMATED COUNT: 14.4 % (ref 11.5–14.5)
EST. GFR  (AFRICAN AMERICAN): >60 ML/MIN/1.73 M^2
EST. GFR  (NON AFRICAN AMERICAN): >60 ML/MIN/1.73 M^2
GLUCOSE SERPL-MCNC: 261 MG/DL (ref 70–110)
HCT VFR BLD AUTO: 37.3 % (ref 37–48.5)
HGB BLD-MCNC: 11.5 G/DL (ref 12–16)
IMM GRANULOCYTES # BLD AUTO: 0.04 K/UL (ref 0–0.04)
IMM GRANULOCYTES NFR BLD AUTO: 0.6 % (ref 0–0.5)
INR PPP: 0.9 (ref 0.8–1.2)
LYMPHOCYTES # BLD AUTO: 1.3 K/UL (ref 1–4.8)
LYMPHOCYTES NFR BLD: 19.7 % (ref 18–48)
MCH RBC QN AUTO: 27.5 PG (ref 27–31)
MCHC RBC AUTO-ENTMCNC: 30.8 G/DL (ref 32–36)
MCV RBC AUTO: 89 FL (ref 82–98)
MONOCYTES # BLD AUTO: 0.6 K/UL (ref 0.3–1)
MONOCYTES NFR BLD: 9.3 % (ref 4–15)
NEUTROPHILS # BLD AUTO: 4.3 K/UL (ref 1.8–7.7)
NEUTROPHILS NFR BLD: 67.3 % (ref 38–73)
NRBC BLD-RTO: 0 /100 WBC
PLATELET # BLD AUTO: 251 K/UL (ref 150–350)
PMV BLD AUTO: 11.9 FL (ref 9.2–12.9)
POTASSIUM SERPL-SCNC: 4.3 MMOL/L (ref 3.5–5.1)
PROT SERPL-MCNC: 7.5 G/DL (ref 6–8.4)
PROTHROMBIN TIME: 10.5 SEC (ref 9–12.5)
RBC # BLD AUTO: 4.18 M/UL (ref 4–5.4)
SARS-COV-2 RDRP RESP QL NAA+PROBE: NEGATIVE
SODIUM SERPL-SCNC: 141 MMOL/L (ref 136–145)
TSH SERPL DL<=0.005 MIU/L-ACNC: 1.96 UIU/ML (ref 0.4–4)
WBC # BLD AUTO: 6.34 K/UL (ref 3.9–12.7)

## 2020-11-09 PROCEDURE — 84443 ASSAY THYROID STIM HORMONE: CPT | Mod: HCNC

## 2020-11-09 PROCEDURE — 85610 PROTHROMBIN TIME: CPT | Mod: HCNC

## 2020-11-09 PROCEDURE — 36415 COLL VENOUS BLD VENIPUNCTURE: CPT | Mod: HCNC

## 2020-11-09 PROCEDURE — 93005 EKG 12-LEAD: ICD-10-PCS | Mod: HCNC,S$GLB,, | Performed by: INTERNAL MEDICINE

## 2020-11-09 PROCEDURE — 85025 COMPLETE CBC W/AUTO DIFF WBC: CPT | Mod: HCNC

## 2020-11-09 PROCEDURE — 93010 EKG 12-LEAD: ICD-10-PCS | Mod: HCNC,S$GLB,, | Performed by: INTERNAL MEDICINE

## 2020-11-09 PROCEDURE — 93005 ELECTROCARDIOGRAM TRACING: CPT | Mod: HCNC,S$GLB,, | Performed by: INTERNAL MEDICINE

## 2020-11-09 PROCEDURE — 99214 OFFICE O/P EST MOD 30 MIN: CPT | Mod: S$PBB,HCNC,, | Performed by: INTERNAL MEDICINE

## 2020-11-09 PROCEDURE — U0002 COVID-19 LAB TEST NON-CDC: HCPCS | Mod: HCNC

## 2020-11-09 PROCEDURE — 99999 PR PBB SHADOW E&M-EST. PATIENT-LVL IV: CPT | Mod: PBBFAC,HCNC,, | Performed by: INTERNAL MEDICINE

## 2020-11-09 PROCEDURE — 85730 THROMBOPLASTIN TIME PARTIAL: CPT | Mod: HCNC

## 2020-11-09 PROCEDURE — 80053 COMPREHEN METABOLIC PANEL: CPT | Mod: HCNC

## 2020-11-09 PROCEDURE — 99999 PR PBB SHADOW E&M-EST. PATIENT-LVL IV: ICD-10-PCS | Mod: PBBFAC,HCNC,, | Performed by: INTERNAL MEDICINE

## 2020-11-09 PROCEDURE — 93010 ELECTROCARDIOGRAM REPORT: CPT | Mod: HCNC,S$GLB,, | Performed by: INTERNAL MEDICINE

## 2020-11-09 PROCEDURE — 99214 PR OFFICE/OUTPT VISIT, EST, LEVL IV, 30-39 MIN: ICD-10-PCS | Mod: S$PBB,HCNC,, | Performed by: INTERNAL MEDICINE

## 2020-11-09 RX ORDER — PROCHLORPERAZINE MALEATE 5 MG
5 TABLET ORAL EVERY 6 HOURS PRN
Qty: 30 TABLET | Refills: 1 | Status: SHIPPED | OUTPATIENT
Start: 2020-11-09 | End: 2021-11-09

## 2020-11-09 RX ORDER — ONDANSETRON 8 MG/1
8 TABLET, ORALLY DISINTEGRATING ORAL EVERY 12 HOURS PRN
Qty: 30 TABLET | Refills: 1 | Status: SHIPPED | OUTPATIENT
Start: 2020-11-09 | End: 2021-11-09

## 2020-11-09 NOTE — PROGRESS NOTES
ONCOLOGY FOLLOW UP VISIT.     Reason for visit: Initial evaluation on [id number] clinical trial for stage IIIA NSCLC    Best Contact Phone Number(s): [phone] (home)      Cancer/Stage/TNM:   Cancer Staging  NSCLC of right lung  Staging form: Lung, AJCC 8th Edition  - Clinical stage from 9/29/2020: Stage IIIA (cT3, cN1, cM0) - Signed by Ramo Tucker MD on 10/24/2020       Oncology History   NSCLC of right lung   9/29/2020 Cancer Staged    Staging form: Lung, AJCC 8th Edition  - Clinical stage from 9/29/2020: Stage IIIA (cT3, cN1, cM0)     10/14/2020 Initial Diagnosis    NSCLC of right lung          Interval History:   Today patient reports no new symptoms.  She has some SINGH since surgery, but improves with rescue inhaler.     ROS:  Review of Systems   Constitutional: Negative for activity change, appetite change, chills, fatigue, fever and unexpected weight change.   HENT: Negative for mouth sores, nosebleeds and sore throat.    Eyes: Negative for visual disturbance.   Respiratory: Positive for shortness of breath (SINGH). Negative for cough and wheezing.    Cardiovascular: Negative for chest pain, palpitations and leg swelling.   Gastrointestinal: Positive for diarrhea. Negative for abdominal distention, abdominal pain and blood in stool.   Endocrine: Negative for cold intolerance and heat intolerance.   Genitourinary: Positive for frequency. Negative for dysuria and flank pain.   Musculoskeletal: Negative for arthralgias, back pain, joint swelling and myalgias.   Skin: Negative for color change, rash and wound.   Neurological: Negative for dizziness, light-headedness and headaches.   Hematological: Negative for adenopathy. Does not bruise/bleed easily.   Psychiatric/Behavioral: The patient is not nervous/anxious.       A complete 12-point review of systems was reviewed and is negative except as mentioned above.     Past Medical History:   Past Medical History:   Diagnosis Date    AICD (automatic  cardioverter/defibrillator) present     Asthma     bronchitis in past    Breast cancer 2016    right    Cardiac pacemaker     Cardiomyopathy     COPD (chronic obstructive pulmonary disease)     Diabetes mellitus, type 2     Hyperglycemia     Hyperlipidemia     Hypertension     Malignant neoplasm of overlapping sites of female breast 2/12/2016    Nuclear sclerosis of both eyes 8/12/2020    Respiratory distress 3/12/2020        Allergies:   Review of patient's allergies indicates:   Allergen Reactions    Adhesive Rash     tegaderm burns and blistered skin        Medications:   Current Outpatient Medications   Medication Sig Dispense Refill    albuterol sulfate (PROAIR RESPICLICK) 90 mcg/actuation AePB Inhale 2 puffs into the lungs every 4 (four) hours as needed. Rescue 1 each 5    albuterol-ipratropium (DUO-NEB) 2.5 mg-0.5 mg/3 mL nebulizer solution Take 3 mLs by nebulization every 6 (six) hours while awake. Rescue 30 mL 5    atorvastatin (LIPITOR) 10 MG tablet Take 1 tablet (10 mg total) by mouth once daily. (Patient taking differently: Take 10 mg by mouth every evening. ) 90 tablet 3    buPROPion (WELLBUTRIN XL) 150 MG TB24 tablet TAKE 1 TABLET BY MOUTH EVERY DAY 90 tablet 1    carvediloL (COREG) 25 MG tablet TAKE 1 TABLET(25 MG) BY MOUTH TWICE DAILY 180 tablet 3    cetirizine (ZYRTEC) 10 MG tablet Take 10 mg by mouth every evening.       fluticasone-salmeterol diskus inhaler 250-50 mcg INHALE 1 PUFF INTO THE LUNGS 2 (TWO) TIMES DAILY. (Patient taking differently: INHALE 1 PUFF INTO THE LUNGS 2 (TWO) TIMES DAILY.) 60 each 5    furosemide (LASIX) 40 MG tablet Take 1 tablet (40 mg total) by mouth once daily. TAKE 1 TABLET(40 MG) BY MOUTH TWICE DAILY 90 tablet 1    metFORMIN (GLUCOPHAGE) 500 MG tablet Take 1 tablet (500 mg total) by mouth 2 (two) times daily. 180 tablet 1    multivitamin (ONE DAILY MULTIVITAMIN) per tablet Take 1 tablet by mouth once daily.      sertraline (ZOLOFT) 100 MG tablet  "Take 1 tablet (100 mg total) by mouth every evening. 90 tablet 1    SPIRIVA WITH HANDIHALER 18 mcg inhalation capsule INHALE THE CONTENTS OF 1 CAPSULE EVERY DAY 90 capsule 0    TRUETEST TEST STRIPS Strp test once EVERY MORNING 100 strip 1    lisinopril 10 MG tablet Take 1 tablet (10 mg total) by mouth once daily. (Patient not taking: Reported on 10/1/2020) 90 tablet 3    ondansetron (ZOFRAN-ODT) 8 MG TbDL Take 1 tablet (8 mg total) by mouth every 12 (twelve) hours as needed. 30 tablet 1    oxyCODONE (ROXICODONE) 5 MG immediate release tablet Take 1 tablet (5 mg total) by mouth every 4 (four) hours as needed for Pain. (Patient not taking: Reported on 11/9/2020) 30 tablet 0    prochlorperazine (COMPAZINE) 5 MG tablet Take 1 tablet (5 mg total) by mouth every 6 (six) hours as needed for Nausea. 30 tablet 1     No current facility-administered medications for this visit.         Physical Exam:   /64 (BP Location: Left arm, Patient Position: Sitting, BP Method: Medium (Automatic))   Pulse 86   Temp 98 °F (36.7 °C) (Oral)   Resp 16   Ht 5' 2" (1.575 m)   Wt 87.2 kg (192 lb 3.9 oz)   LMP  (LMP Unknown)   SpO2 96%   BMI 35.16 kg/m²      ECOG Performance Status: (foot note - ECOG PS provided by Eastern Cooperative Oncology Group) 0 - Asymptomatic    Physical Exam  Constitutional:       Appearance: She is well-developed.   HENT:      Head: Normocephalic and atraumatic.   Eyes:      Conjunctiva/sclera: Conjunctivae normal.      Pupils: Pupils are equal, round, and reactive to light.   Neck:      Musculoskeletal: Normal range of motion and neck supple.   Pulmonary:      Effort: Pulmonary effort is normal. No respiratory distress.   Abdominal:      General: There is no distension.      Palpations: Abdomen is soft.   Musculoskeletal: Normal range of motion.         General: No swelling.   Lymphadenopathy:      Cervical: No cervical adenopathy.   Skin:     General: Skin is warm and dry.   Neurological:      " General: No focal deficit present.      Mental Status: She is alert and oriented to person, place, and time.   Psychiatric:         Mood and Affect: Mood normal.         Behavior: Behavior normal.           Labs:   Recent Results (from the past 48 hour(s))   CBC W/ AUTO DIFFERENTIAL    Collection Time: 11/09/20 10:34 AM   Result Value Ref Range    WBC 6.34 3.90 - 12.70 K/uL    RBC 4.18 4.00 - 5.40 M/uL    Hemoglobin 11.5 (L) 12.0 - 16.0 g/dL    Hematocrit 37.3 37.0 - 48.5 %    MCV 89 82 - 98 fL    MCH 27.5 27.0 - 31.0 pg    MCHC 30.8 (L) 32.0 - 36.0 g/dL    RDW 14.4 11.5 - 14.5 %    Platelets 251 150 - 350 K/uL    MPV 11.9 9.2 - 12.9 fL    Immature Granulocytes 0.6 (H) 0.0 - 0.5 %    Gran # (ANC) 4.3 1.8 - 7.7 K/uL    Immature Grans (Abs) 0.04 0.00 - 0.04 K/uL    Lymph # 1.3 1.0 - 4.8 K/uL    Mono # 0.6 0.3 - 1.0 K/uL    Eos # 0.1 0.0 - 0.5 K/uL    Baso # 0.06 0.00 - 0.20 K/uL    nRBC 0 0 /100 WBC    Gran % 67.3 38.0 - 73.0 %    Lymph % 19.7 18.0 - 48.0 %    Mono % 9.3 4.0 - 15.0 %    Eosinophil % 2.2 0.0 - 8.0 %    Basophil % 0.9 0.0 - 1.9 %    Differential Method Automated    COMPREHENSIVE METABOLIC PANEL    Collection Time: 11/09/20 10:34 AM   Result Value Ref Range    Sodium 141 136 - 145 mmol/L    Potassium 4.3 3.5 - 5.1 mmol/L    Chloride 102 95 - 110 mmol/L    CO2 27 23 - 29 mmol/L    Glucose 261 (H) 70 - 110 mg/dL    BUN 18 8 - 23 mg/dL    Creatinine 1.0 0.5 - 1.4 mg/dL    Calcium 9.3 8.7 - 10.5 mg/dL    Total Protein 7.5 6.0 - 8.4 g/dL    Albumin 3.5 3.5 - 5.2 g/dL    Total Bilirubin 0.4 0.1 - 1.0 mg/dL    Alkaline Phosphatase 60 55 - 135 U/L    AST 23 10 - 40 U/L    ALT 20 10 - 44 U/L    Anion Gap 12 8 - 16 mmol/L    eGFR if African American >60.0 >60 mL/min/1.73 m^2    eGFR if non African American >60.0 >60 mL/min/1.73 m^2   TSH    Collection Time: 11/09/20 10:34 AM   Result Value Ref Range    TSH 1.963 0.400 - 4.000 uIU/mL   Protime-INR    Collection Time: 11/09/20 10:34 AM   Result Value Ref Range     Prothrombin Time 10.5 9.0 - 12.5 sec    INR 0.9 0.8 - 1.2   PTT    Collection Time: 11/09/20 10:34 AM   Result Value Ref Range    aPTT 29.1 21.0 - 32.0 sec   COVID-19 Rapid Screening    Collection Time: 11/09/20 11:11 AM   Result Value Ref Range    SARS-CoV-2 RNA, Amplification, Qual Negative Negative        Imaging:  CT Chest With Contrast  Narrative: EXAMINATION:  CT CHEST WITH CONTRAST    CLINICAL HISTORY:  Baseline staging for clinical trial; Malignant neoplasm of unspecified part of right bronchus or lung    TECHNIQUE:  Low dose axial images, sagittal and coronal reformations were obtained from the thoracic inlet to the lung bases. Contrast was not administered.    COMPARISON:  CT chest: 09/16/2020. and PET-CT dated 09/29/2020    FINDINGS:  Base of Neck and thoracic soft tissue: Unremarkable.  Left percutaneous cardiac device noted.    Aorta: Left-sided aortic arch with 3 branch vessels.  Minimal aortic arch and coronary calcific atherosclerosis.    Heart: No pericardial effusion.    Larissa/Mediastinum: Subcarinal lymph node, measures 1.0 cm, slightly increased in size in comparison to the prior exam (axial series 2, image 41).  Few upper mediastinal nodes appear slightly more conspicuous though remain nonenlarged by short axis criteria.  Enlarged right hilar node measuring 2.2 x 1.8 cm (series 4, image 232).  Accurate size comparison is difficult given previous noncontrast technique.    Airways: Patent.    Lungs/Pleura:    Redemonstration of lobulated, partly spiculated right lower lobe mass measuring 5.1 x 3.7 x 4.2 cm (series 2, image 60 and series 604, image 169), previously 5.0 x 3.5 x 3.9 cm.    Focal opacity with angular margins near the superior aspect of the right lower lobe, suspect component of focal atelectasis.  Stable bandlike opacity in the right middle lobe, possible scar or atelectasis.  There is more conspicuous ill-defined focal density within the posteromedial right upper lobe (series 4, image  96) as well as new appearing subcentimeter right upper lobe 2 mm nodule (series 4, image 128).  Other subcentimeter solid-appearing nodules appear similar in the interval.    Mild biapical centrilobular emphysematous changes.    Esophagus: Unremarkable.    Upper Abdomen: Prior cholecystectomy.  No acute abnormality is identified in the partially imaged upper abdomen.    Bones: Spine DJD.  No aggressive osseous lesion.  Impression: Lobulated right lower lobe mass appears similar to minimally increased in size in the interval.    Redemonstration of previous PET avid right hilar node.    Slight increased size of subcarinal node measuring up to 1.0 cm as well as more conspicuous nonenlarged upper mediastinal nodes.  Close attention at follow-up suggested at a minimum.    More conspicuous ill-defined focal density in the posteromedial right upper lobe as well as new appearing 2 mm right upper lobe nodule, indeterminate.  Continued close attention at follow-up.  Other subcentimeter solid-appearing nodules appear similar in the interval.    RECIST SUMMARY:    Date of prior examination for comparison: 09/16/2020    Lesion 1: Right lower lobe mass.  5.1 cm.  Series 2, image 60.  Previously 5.0 cm.    Lesion 2: Right hilar node.  1.8 cm short axis.  Series 4, image 232.  (Note that accurate comparison is difficult given previous noncontrast technique).    Electronically signed by resident: Alicia Ballard  Date:    11/08/2020  Time:    08:46    Electronically signed by: Juvenal Blanton  Date:    11/08/2020  Time:    11:00            Diagnoses:       1. NSCLC of right lung    2. History of breast cancer in female    3. Dilated cardiomyopathy    4. Left bundle-branch block    5. Cardiac resynchronization therapy defibrillator (CRT-D) in place    6. Essential hypertension    7. Simple chronic bronchitis    8. Type 2 diabetes mellitus without complication, without long-term current use of insulin    9. Chemotherapy-induced  nausea          Assessment and Plan:         1. Stage IIIA NSCLC  Plan is for definitive chemoRT, will need re-staging scans prior.  Reviewed with patient in detail her diagnosis, stage, treatment options, and prognosis (3 year OS 66% vs. 43.5% without durvalumab) with standard of care chemoRT followed by maintenance immunotherapy.  Discussed with patient our [id number] clinical trial, a randomized control trial evaluating combination chemoRT + durvalumab followed by maintenance vs. SOC chemoRT -> maintenance.  - patient has consented for [id number] awaiting randomization.  Patient has a good performance status ECOG 1 and life expectancy > 12 weeks.  - referring to surgery for port placement.     2. 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      3,4,5 Stable, follows with cardiology.  AICD placed, but patient now has recovered EF and only takes lasix prn.  NYHA class I.     6 Controlled continue to follow with PCP and cardiologist.     7 Controlled on inhalers     8 Last HbA1c 7, controlled     9. PRescribed Zofran and Compazine         Greater than 50% of this time involved counseling or coordination of care. I have provided the patient with an opportunity to ask questions and have all questions answered to his satisfaction.     she will return to clinic per protocol, but knows to call in the interim if symptoms change or should a problem arise.    Javi Avina MD  Medical Oncology   [facility]  [facility]

## 2020-11-09 NOTE — PROGRESS NOTES
Protocol: Randomized Phase III Trial of [id number] (Durvalumab) as Concurrent and Consolidative Therapy or Consolidative Therapy Alone for Unresectable Stage 3 NSCLC   Protocol # [id number]          IRB # 2020.326  PI: Carmen Akhtar MD  Treating Physician: Javi Avina MD    November 9, 2020     Screening Visit    Patient seen in clinic today for her screening visit. She is anxious regarding diagnosis and starting chemotherapy. Reassurance offered.   Labs, EKG, and COVID screening performed. H&P and ECOG completed. Baseline CT scan was done on 11/7/2020. RECIST lesions were reviewed with Dr. Avina. Patient had PFTs done on 10/14/2020 and per study chair Dr. Trujillo these do not need to be repeated. Plan of care reviewed with patient and MD.  Baseline con meds and medical history reviewed. See logs in research chart for most up to date information.     Baseline Medical History    1. Dyspnea- Grade 1- Patient has a history of COPD requiring the use of multiple inhalers. She has dyspnea on moderate exertion and requires rescue inhaler PRN.  2. Hyperlipidemia- Grade 2- Patient takes Atorvastin.  3. Anxiety- Grade 2- Patient takes Wellbutrin and Zoloft.  4. Depression- Grade 2- Patient takes Wellbutrin and Zoloft.  5. Hypertension- Grade 2 (intermittent)- Patient had a couple of elevated BPs in clinic 158/74 & 166/83. Patient states that she was anxious at the time. She states that her SBP is usually around 140 and her DBP is usually around 80. Her BP in clinic today is 126/64. She takes Coreg, Lisinopril, and Lasix.   6. Heart failure- Grade 2- Patient has a history of cardiomyopathy. She is NYHA 1 per Dr. Avina. She has an AICD/PM with recovered EF.She takes Coreg, Lisinopril, and Lasix.  7. Seasonal allergies (not graded/baseline issue)- Alternates Claritin and Zyrtec.  8. Hyperglycemia- Grade 2- Patient has history of DM Type II. She takes Metformin for this.   9. Pain- Grade 1- Patient has intermittent mild pain to  surgical site and occasional headaches.  10. Diarrhea-(not graded/baseline issue) Patient has loose watery stools after taking her Metformin. She states that she usually has about 4 loose stools in the AM and 2 loose stools in the PM. Her baseline # of stools is about 6 a day. Dr. Avina made aware. This should not be an issue since it is relted to her Metformin. She controls this with Imodium PRN. Will monitor. Patient educated on s/s to notify physician of.   11. Anorexia- Grade 2- Patient states that she has had a poor appetite the last week and has lost about 8 pounds since her last visit. She attributes this to her nerves. Nutrition consult offered by Dr. Avina. Patient would like to try Glucerna prior to nutrition consult.   12. Anemia- Grade 1- Hgb is 11.5 g/dl on screening labs.     Waiting on the results of her screening procedures. Will likely register tomorrow. I will update the patient once I have more information. She has my contact info in the interim.

## 2020-11-10 ENCOUNTER — RESEARCH ENCOUNTER (OUTPATIENT)
Dept: RESEARCH | Facility: HOSPITAL | Age: 65
End: 2020-11-10

## 2020-11-10 ENCOUNTER — TELEPHONE (OUTPATIENT)
Dept: SURGERY | Facility: CLINIC | Age: 65
End: 2020-11-10

## 2020-11-10 ENCOUNTER — ANESTHESIA EVENT (OUTPATIENT)
Dept: SURGERY | Facility: HOSPITAL | Age: 65
End: 2020-11-10
Payer: MEDICARE

## 2020-11-10 DIAGNOSIS — R91.8 LUNG MASS: Primary | ICD-10-CM

## 2020-11-10 RX ORDER — DEXAMETHASONE 4 MG/1
8 TABLET ORAL DAILY
Qty: 24 TABLET | Refills: 1 | Status: SHIPPED | OUTPATIENT
Start: 2020-11-10 | End: 2020-11-17

## 2020-11-10 NOTE — PRE-PROCEDURE INSTRUCTIONS
PREOP INSTRUCTIONS:No solid food ,milk or milk products for 8 hours prior to procedure.Clear liquids are allowed up to 2 hours before procedure.Clear liquids are:water,apple juice,gatorade & powerade.Instructed to follow the surgeon's instructions if they differ from these.Shower instructions as well as directions to the Surgery Center were given.Encouraged to wear loose fitting,comfortable clothing.Medication instructions for pm prior to and am of procedure reviewed.Instructed to avoid taking vitamins,supplements,aspirin and ibuprofen the morning of surgery. Patient's questions and concerns addressed .Patient informed of the current visitor policy and advised patient that one visitor may accompany each patient into the hospital and wait (socially distanced) until a member of the medical team provides an update at the conclusion of the procedure.When they enter the hospital both patient and visitor will have their temperature checked.All visitors are asked to arrive with a mask and to keep their mask on throughout the visit.     PATIENT HAS A MEDTRONIC ICD.KING HARDING IN THE [facility] NOTIFIED AT 1150 TUESDAY 11/10/2020-NO REPROGRAMMING REQUIRED.OK TO PLACE A MAGNET OVER DEVICE IF CAUTERY WILL BE USED    Patient denies any side effects or issues with anesthesia or sedation.     Patient does not know arrival time.Explained that this information comes from the surgeon's office and if they haven't heard from them by 2 or 3 pm to call the office.Patient stated an understanding.

## 2020-11-10 NOTE — PROGRESS NOTES
Protocol: Randomized Phase III Trial of [id number] (Durvalumab) as Concurrent and Consolidative Therapy or Consolidative Therapy Alone for Unresectable Stage 3 NSCLC   Protocol # [id number]          IRB # 2020.326  PI: Carmen Akhtar MD  Treating Physician: Javi Avina MD  Study ID #: [id number]    November 10, 2020    Eligibility Note    Screening procedures completed and reviewed with investigator. Double eligibility completed by Kelsie Mosley RN and me.     The below criteria were clarified further with Dr. Avina and study chair Dr. Trujillo prior to enrollment.      3.1.2- Restaging baseline scan reviewed. Patient is newly diagnosed stage IIIA (T3N1M0) unresectable squamous cell carcinoma of the lung.      3.1.9 -Patient had PFTs within 5 months of registration on 10/14/2020. Per Dr. Trujillo, these do not need to be repeated within 7 days prior to registration. See email correspondence with study chair.      3.1.13 -Patient has a history of stage 1A breast cancer s/p lumpectomy in 2016. Per Dr. Avina, the natural history or treatment does not have the potential to interfere with the safety or efficacy assessment of the investigational regimen.     3.1.27 -Patient has a history of cardiac disease. A clinical risk assessment of cardiac function using the New York Heart Association Functional Classification was performed. She is a NYHA class IB per Dr. Avina. Patient has not had any of the following.  o Unstable angina and/or congestive heart failure requiring hospitalization within 180 days prior to registration.   o Uncontrolled cardiac arrhythmia within 180 days prior to registration.      3.1.28 -Patients intercurrent illnesses (CHF, HTN, DM type II, COPD) are all controlled. In addition, while patient has diarrhea related to her Metformin, she does not have a serious chronic gastrointestinal condition associated with diarrhea.    Eligibility checklist reviewed, signed, and dated prior to  registration/randomization by Dr. Avina.     Patient deemed eligible for trial and was randomized in OPEN. She was assigned to ARM B and will receive concurrent chemo/RT with Carboplatin and Taxol. Dr. Avina entered treatment plan into [facility]. Radiation Oncology team updated on patient assignment and the treatment plan will be submitted today for approval per Hernandez Alvarado. Patient was called and updated on treatment assignment. Reiterated the plan of care. Patient verbalized understanding and denies any questions. Will start treatment on protocol on 11/17/2020.    Eligibility checklist can be found attached to this note.

## 2020-11-10 NOTE — ANESTHESIA PREPROCEDURE EVALUATION
[facility]  Anesthesia Pre-Operative Evaluation       Patient Name: Hannah Montoya  YOB: 1955  MRN: [id number]  CSN: [id number]      Code Status: Prior   Date of Procedure: 11/11/2020  Anesthesia: General Procedure: Procedure(s) (LRB):  [id number], RIGHT POSS LEFT (Right)  Pre-Operative Diagnosis: NSCLC of right lung [C34.91]  Proceduralist: Surgeon(s) and Role:     * Josefa Caceres MD - Primary        SUBJECTIVE:   Hannah Montoya is a 65 y.o. female who  has a past medical history of AICD (automatic cardioverter/defibrillator) present, Asthma, Breast cancer (2016), Cardiac pacemaker, Cardiomyopathy, COPD (chronic obstructive pulmonary disease), Diabetes mellitus, type 2, Hyperglycemia, Hyperlipidemia, Hypertension, Malignant neoplasm of overlapping sites of female breast (2/12/2016), Nuclear sclerosis of both eyes (8/12/2020), and Respiratory distress (3/12/2020)..   Revised cardiac risk index (RCRI) score is 2    Hannah Montoya is a 65 y.o. female w/ a significant PMHx of HTN, HLD, T2DM (not on insulin), NICM, LBBB, CRT-D (medtronic ICD/BiV PM), asthma/COPD, breast cancer s/p lumpectomy, and newly diagnosed stage 3A NSCLC of the right lung who presents for port a cath placement to initiate chemo.     Patient initially presented with SOB in March. COVID negative however noted RLL mass. Treated with ABX. Repeat May 2020 with persistent mass. CT guided biopsy non diagnostic of malignancy. Repeat CT in sept 2020 with interval enlargement. PET with SUV 22 of RLL mass and right hilar SUV 9. She had an EBUS 10/13 and 11R LN biopsy revealed SCC. Plan was for robotic resection of mass. Hilar LN of concern on PET and positive on FNA was densely adherent to basilar segment and middle lobe arterial branches, mandating lower bilobectomy therefore resection was aborted. She will undergo [id number] clinical trial, a randomized control trial evaluating  combination chemoRT + durvalumab followed by maintenance vs. SOC chemoRT -> maintenance.    Patient now presents for the above procedure(s).    CT chest w contrast (11/6/20)  Lobulated right lower lobe mass appears similar to minimally increased in size in the interval. Redemonstration of previous PET avid right hilar node. Slight increased size of subcarinal node measuring up to 1.0 cm as well as more conspicuous nonenlarged upper mediastinal nodes.  Close attention at follow-up suggested at a minimum.  More conspicuous ill-defined focal density in the posteromedial right upper lobe as well as new appearing 2 mm right upper lobe nodule, indeterminate.  Continued close attention at follow-up.  Other subcentimeter solid-appearing nodules appear similar in the interval.    Echo dobutamine stress test (10/14/20):  · The stress echo portion of this study is negative for myocardial ischemia.  · The ECG portion of this study is negative for myocardial ischemia.  · During stress, the following significant arrhythmias were observed: occasional PVCs.  · The patient's exercise capacity was above average.  · The patient reached the end of the protocol.  · With normal systolic function. The estimated ejection fraction is 60%.  · Grade II diastolic dysfunction.  · Normal right ventricular systolic function.  · Mild left atrial enlargement.  · The estimated PA systolic pressure is 40 mmHg.  · Normal central venous pressure (3 mmHg).    MEDTRONIC Viva S CRT-D [id number]  Device fxn WNL   Autocapture algorithms: RA and RV ON, LV (HIS) autocapture off  Presenting egram demonstrates As/Bp  Underlying rhythm c/w  SR  RA pacing  0%, AS/BiV pacing 99.8% (RV and His)  Atrial arrhythmias: none  Anticoagulation Status: none  Ventricular arrhythmias: none  Battery Status/Longevity: 3.4 yrs  Reprogramming at this visit: Increased pulse width from 0.5 ms to 1.0 ms do to HIS lead placement.  Changed HIS output from 3.0 - 2.0 V    LDA: None  documented.    Prev airway: 08/10/17; 1301; Direct laryngoscopy; Inserted by: Other (lsu srna anaid munoz); Airway Device: Endotracheal Tube; Mask Ventilation: Easy; Intubated: Postinduction; Blade: Michele #3; Airway Device Size: 7.0; Grade I; Depth of Insertion: 21; Securment: Lips; Attempts: 1    Drips: None documented.    she has a current medication list which includes the following long-term medication(s): albuterol sulfate, albuterol-ipratropium, atorvastatin, bupropion, carvedilol, cetirizine, fluticasone-salmeterol 250-50 mcg/dose, furosemide, metformin, sertraline, and spiriva with handihaler.     ALLERGIES:     Review of patient's allergies indicates:   Allergen Reactions    Adhesive Rash     tegaderm burns and blistered skin     LDA:      Lines/Drains/Airways     Epidural Line                 Perineural Analgesia/Anesthesia Assessment (using dermatomes) 10/23/20 0651 19 days          Arterial Line            Arterial Line 10/23/20 0735 Right Other (Comment) 19 days               Anesthesia Evaluation      Airway   Mallampati: II  TM distance: Normal, at least 6 cm  Dental      Pulmonary    (+) COPD severe, asthma, shortness of breath,   Cardiovascular   (+) pacemaker (Medtronic BiV CRT; DDD; underlying SR), hypertension well controlled, CHF, SINGH,     ECG reviewed    Neuro/Psych      GI/Hepatic/Renal      Endo/Other    (+) diabetes mellitus type 2,   Abdominal                   MEDICATIONS:     Antibiotics (From admission, onward)    None        VTE Risk Mitigation (From admission, onward)    None        No current facility-administered medications for this encounter.      Current Outpatient Medications   Medication Sig Dispense Refill    atorvastatin (LIPITOR) 10 MG tablet Take 1 tablet (10 mg total) by mouth once daily. (Patient taking differently: Take 10 mg by mouth every evening. ) 90 tablet 3    buPROPion (WELLBUTRIN XL) 150 MG TB24 tablet TAKE 1 TABLET BY MOUTH EVERY DAY (Patient taking  differently: Take 150 mg by mouth every morning. ) 90 tablet 1    carvediloL (COREG) 25 MG tablet TAKE 1 TABLET(25 MG) BY MOUTH TWICE DAILY 180 tablet 3    cetirizine (ZYRTEC) 10 MG tablet Take 10 mg by mouth every evening.       fluticasone-salmeterol diskus inhaler 250-50 mcg INHALE 1 PUFF INTO THE LUNGS 2 (TWO) TIMES DAILY. (Patient taking differently: INHALE 1 PUFF INTO THE LUNGS 2 (TWO) TIMES DAILY.) 60 each 5    furosemide (LASIX) 40 MG tablet Take 1 tablet (40 mg total) by mouth once daily. TAKE 1 TABLET(40 MG) BY MOUTH TWICE DAILY 90 tablet 1    metFORMIN (GLUCOPHAGE) 500 MG tablet Take 1 tablet (500 mg total) by mouth 2 (two) times daily. 180 tablet 1    multivitamin (ONE DAILY MULTIVITAMIN) per tablet Take 1 tablet by mouth once daily.      sertraline (ZOLOFT) 100 MG tablet Take 1 tablet (100 mg total) by mouth every evening. 90 tablet 1    SPIRIVA WITH HANDIHALER 18 mcg inhalation capsule INHALE THE CONTENTS OF 1 CAPSULE EVERY DAY 90 capsule 0    albuterol sulfate (PROAIR RESPICLICK) 90 mcg/actuation AePB Inhale 2 puffs into the lungs every 4 (four) hours as needed. Rescue 1 each 5    albuterol-ipratropium (DUO-NEB) 2.5 mg-0.5 mg/3 mL nebulizer solution Take 3 mLs by nebulization every 6 (six) hours while awake. Rescue 30 mL 5    dexAMETHasone (DECADRON) 4 MG Tab Take 2 tablets (8 mg total) by mouth once daily. Take as directed on days 2 and 3 of your chemotherapy cycle. 24 tablet 1    ondansetron (ZOFRAN-ODT) 8 MG TbDL Take 1 tablet (8 mg total) by mouth every 12 (twelve) hours as needed. 30 tablet 1    oxyCODONE (ROXICODONE) 5 MG immediate release tablet Take 1 tablet (5 mg total) by mouth every 4 (four) hours as needed for Pain. (Patient not taking: Reported on 11/9/2020) 30 tablet 0    prochlorperazine (COMPAZINE) 5 MG tablet Take 1 tablet (5 mg total) by mouth every 6 (six) hours as needed for Nausea. 30 tablet 1    TRUETEST TEST STRIPS Strp test once EVERY MORNING 100 strip 1           History:   There are no hospital problems to display for this patient.    Surgical History:    has a past surgical history that includes Cholecystectomy;  section; Tubal ligation; Breast biopsy (Right, 2016); Breast lumpectomy (Right); Revision of implantable cardioverter-defibrillator (ICD) electrode lead placement (N/A, 6/15/2018); Cardiac catheterization (Bilateral, 2017); Cardiac defibrillator placement (Left, 08/10/2017); Cardiac defibrillator placement (Left, 2018); Lung biopsy (N/A, 2020); Navigational bronchoscopy (N/A, 10/13/2020); and Robot-assisted laparoscopic lymphadenectomy using da Temi Xi (Right, 10/23/2020).   Social History:    reports previously being sexually active and has had partner(s) who are Male.  reports that she quit smoking about 4 years ago. Her smoking use included cigarettes. She has a 20.00 pack-year smoking history. She has never used smokeless tobacco. She reports current alcohol use. She reports that she does not use drugs.     OBJECTIVE:     Vital Signs (Most Recent):    Vital Signs Range (Last 24H):          There is no height or weight on file to calculate BMI.   Wt Readings from Last 4 Encounters:   20 87.2 kg (192 lb 3.9 oz)   20 88.2 kg (194 lb 5.4 oz)   20 90.7 kg (199 lb 15.3 oz)   20 90.7 kg (199 lb 15.3 oz)     Significant Labs:  Lab Results   Component Value Date    WBC 6.34 2020    HGB 11.5 (L) 2020    HCT 37.3 2020     2020     2020    K 4.3 2020     2020    CREATININE 1.0 2020    BUN 18 2020    CO2 27 2020     (H) 2020    CALCIUM 9.3 2020    MG 1.7 2017    ALKPHOS 60 2020    ALT 20 2020    AST 23 2020    ALBUMIN 3.5 2020    INR 0.9 2020    APTT 29.1 2020    HGBA1C 7.0 (H) 2020    TROPONINI <0.006 2020    BNP 17 2020     No LMP recorded (lmp unknown). Patient  is postmenopausal.  Recent Results (from the past 72 hour(s))   CBC W/ AUTO DIFFERENTIAL    Collection Time: 11/09/20 10:34 AM   Result Value Ref Range    WBC 6.34 3.90 - 12.70 K/uL    RBC 4.18 4.00 - 5.40 M/uL    Hemoglobin 11.5 (L) 12.0 - 16.0 g/dL    Hematocrit 37.3 37.0 - 48.5 %    MCV 89 82 - 98 fL    MCH 27.5 27.0 - 31.0 pg    MCHC 30.8 (L) 32.0 - 36.0 g/dL    RDW 14.4 11.5 - 14.5 %    Platelets 251 150 - 350 K/uL    MPV 11.9 9.2 - 12.9 fL    Immature Granulocytes 0.6 (H) 0.0 - 0.5 %    Gran # (ANC) 4.3 1.8 - 7.7 K/uL    Immature Grans (Abs) 0.04 0.00 - 0.04 K/uL    Lymph # 1.3 1.0 - 4.8 K/uL    Mono # 0.6 0.3 - 1.0 K/uL    Eos # 0.1 0.0 - 0.5 K/uL    Baso # 0.06 0.00 - 0.20 K/uL    nRBC 0 0 /100 WBC    Gran % 67.3 38.0 - 73.0 %    Lymph % 19.7 18.0 - 48.0 %    Mono % 9.3 4.0 - 15.0 %    Eosinophil % 2.2 0.0 - 8.0 %    Basophil % 0.9 0.0 - 1.9 %    Differential Method Automated    COMPREHENSIVE METABOLIC PANEL    Collection Time: 11/09/20 10:34 AM   Result Value Ref Range    Sodium 141 136 - 145 mmol/L    Potassium 4.3 3.5 - 5.1 mmol/L    Chloride 102 95 - 110 mmol/L    CO2 27 23 - 29 mmol/L    Glucose 261 (H) 70 - 110 mg/dL    BUN 18 8 - 23 mg/dL    Creatinine 1.0 0.5 - 1.4 mg/dL    Calcium 9.3 8.7 - 10.5 mg/dL    Total Protein 7.5 6.0 - 8.4 g/dL    Albumin 3.5 3.5 - 5.2 g/dL    Total Bilirubin 0.4 0.1 - 1.0 mg/dL    Alkaline Phosphatase 60 55 - 135 U/L    AST 23 10 - 40 U/L    ALT 20 10 - 44 U/L    Anion Gap 12 8 - 16 mmol/L    eGFR if African American >60.0 >60 mL/min/1.73 m^2    eGFR if non African American >60.0 >60 mL/min/1.73 m^2   TSH    Collection Time: 11/09/20 10:34 AM   Result Value Ref Range    TSH 1.963 0.400 - 4.000 uIU/mL   Protime-INR    Collection Time: 11/09/20 10:34 AM   Result Value Ref Range    Prothrombin Time 10.5 9.0 - 12.5 sec    INR 0.9 0.8 - 1.2   PTT    Collection Time: 11/09/20 10:34 AM   Result Value Ref Range    aPTT 29.1 21.0 - 32.0 sec   COVID-19 Rapid Screening    Collection  Time: 11/09/20 11:11 AM   Result Value Ref Range    SARS-CoV-2 RNA, Amplification, Qual Negative Negative       EKG:   Results for orders placed or performed during the hospital encounter of 11/09/20   EKG 12-lead    Collection Time: 11/09/20  9:46 AM    Narrative    Test Reason : C34.91,Z00.6,    Vent. Rate : 086 BPM     Atrial Rate : 086 BPM     P-R Int : 144 ms          QRS Dur : 080 ms      QT Int : 392 ms       P-R-T Axes : 081 015 067 degrees     QTc Int : 469 ms    Normal sinus rhythm  Atrial-sensed ventricular-paced rhythm  Biventricular pacemaker detected  Abnormal ECG  When compared with ECG of 14-OCT-2020 14:42,  ventricular-paced complexes Now predominates  Confirmed by Ramin Clay MD ([id number]) on 11/9/2020 4:07:39 PM    Referred By: ZAIDA VARGAS           Confirmed By:Ramin Clay MD       TTE:  Results for orders placed or performed during the hospital encounter of 10/16/18   2D echo with color flow doppler   Result Value Ref Range    QEF 53 55 - 65    Mitral Valve Regurgitation TRIVIAL TO MILD     Diastolic Dysfunction No     Aortic Valve Regurgitation TRIVIAL     Est. PA Systolic Pressure 31.09     Tricuspid Valve Regurgitation TRIVIAL      Echo dobutamine stress test (10/14/20):  · The stress echo portion of this study is negative for myocardial ischemia.  · The ECG portion of this study is negative for myocardial ischemia.  · During stress, the following significant arrhythmias were observed: occasional PVCs.  · The patient's exercise capacity was above average.  · The patient reached the end of the protocol.  · With normal systolic function. The estimated ejection fraction is 60%.  · Grade II diastolic dysfunction.  · Normal right ventricular systolic function.  · Mild left atrial enlargement.  · The estimated PA systolic pressure is 40 mmHg.  · Normal central venous pressure (3 mmHg).    Anesthesia Evaluation    I have reviewed the Patient Summary Reports.    I have reviewed the Nursing Notes. I  have reviewed the NPO Status.   I have reviewed the Medications.     Review of Systems  Anesthesia Hx:  No problems with previous Anesthesia  History of prior surgery of interest to airway management or planning: Denies Family Hx of Anesthesia complications.   Denies Personal Hx of Anesthesia complications.   Social:  Former Smoker, Social Alcohol Use Ex smoker with 20 pack years, quit 8/2016   Hematology/Oncology:  Hematology Normal      Current/Recent Cancer. Breast Oncology Comments: Breast cancer s/p lumpectomy  Newly diagnosed stage 3A NSCLC of R lung    EENT/Dental:EENT/Dental Normal   Cardiovascular:   Pacemaker (Medtronic BiV CRT; DDD; underlying SR) Hypertension, well controlled CHF hyperlipidemia SINGH ECG has been reviewed.    Pulmonary:   COPD, severe Asthma Shortness of breath    Hepatic/GI:  Hepatic/GI Normal    Musculoskeletal:  Musculoskeletal Normal    Neurological:  Neurology Normal    Endocrine:   Diabetes, type 2 HbA1c 7.0% (8/3/20)   Dermatological:  Skin Normal    Psych:  Psychiatric Normal           Physical Exam  General:  Obesity    Airway/Jaw/Neck:  Airway Findings: Mouth Opening: Normal Tongue: Normal  General Airway Assessment: Adult  Mallampati: II  TM Distance: Normal, at least 6 cm        Eyes/Ears/Nose:  EYES/EARS/NOSE FINDINGS: Normal   Dental:  DENTAL FINDINGS: Normal   Chest/Lungs:  Chest/Lungs Findings: Decreased Breath Sounds Bilateral     Heart/Vascular:  Heart Findings: Normal Heart murmur: negative Vascular Findings: Normal    Abdomen:  Abdomen Findings: Normal    Musculoskeletal:  Musculoskeletal Findings: Normal   Skin:  Skin Findings: Normal    Mental Status:  Mental Status Findings: Normal        Anesthesia Plan  Type of Anesthesia, risks & benefits discussed:  Anesthesia Type:  general, MAC  Patient's Preference:   Intra-op Monitoring Plan: standard ASA monitors  Intra-op Monitoring Plan Comments:   Post Op Pain Control Plan: multimodal analgesia, IV/PO Opioids PRN and per  primary service following discharge from PACU  Post Op Pain Control Plan Comments:   Induction:   IV  Beta Blocker:  Patient is on a Beta-Blocker and has received one dose within the past 24 hours (No further documentation required).       Informed Consent:    ASA Score: 3     Day of Surgery Review of History & Physical:    H&P update referred to the surgeon.         Ready For Surgery From Anesthesia Perspective.

## 2020-11-11 ENCOUNTER — HOSPITAL ENCOUNTER (OUTPATIENT)
Facility: HOSPITAL | Age: 65
Discharge: HOME | End: 2020-11-11
Attending: STUDENT IN AN ORGANIZED HEALTH CARE EDUCATION/TRAINING PROGRAM | Admitting: STUDENT IN AN ORGANIZED HEALTH CARE EDUCATION/TRAINING PROGRAM
Payer: MEDICARE

## 2020-11-11 ENCOUNTER — ANESTHESIA (OUTPATIENT)
Dept: SURGERY | Facility: HOSPITAL | Age: 65
End: 2020-11-11
Payer: MEDICARE

## 2020-11-11 ENCOUNTER — DOCUMENTATION ONLY (OUTPATIENT)
Dept: ELECTROPHYSIOLOGY | Facility: CLINIC | Age: 65
End: 2020-11-11

## 2020-11-11 VITALS
OXYGEN SATURATION: 94 % | TEMPERATURE: 98 F | DIASTOLIC BLOOD PRESSURE: 60 MMHG | SYSTOLIC BLOOD PRESSURE: 121 MMHG | HEART RATE: 64 BPM | RESPIRATION RATE: 21 BRPM

## 2020-11-11 DIAGNOSIS — C34.91 NSCLC OF RIGHT LUNG: ICD-10-CM

## 2020-11-11 PROBLEM — Z13.9 ENCOUNTER FOR SCREENING: Status: ACTIVE | Noted: 2020-11-11

## 2020-11-11 LAB
ADEQUACY: ABNORMAL
FINAL PATHOLOGIC DIAGNOSIS: ABNORMAL
MICROSCOPIC EXAM: ABNORMAL
POCT GLUCOSE: 107 MG/DL (ref 70–110)
POCT GLUCOSE: 177 MG/DL (ref 70–110)
SUPPLEMENTAL DIAGNOSIS: ABNORMAL

## 2020-11-11 PROCEDURE — 36000707: Mod: HCNC | Performed by: STUDENT IN AN ORGANIZED HEALTH CARE EDUCATION/TRAINING PROGRAM

## 2020-11-11 PROCEDURE — 77001 CHG FLUOROGUIDE CNTRL VEN ACCESS,PLACE,REPLACE,REMOVE: ICD-10-PCS | Mod: 26,HCNC,, | Performed by: STUDENT IN AN ORGANIZED HEALTH CARE EDUCATION/TRAINING PROGRAM

## 2020-11-11 PROCEDURE — 36000706: Mod: HCNC | Performed by: STUDENT IN AN ORGANIZED HEALTH CARE EDUCATION/TRAINING PROGRAM

## 2020-11-11 PROCEDURE — 36561 PR INSERT TUNNELED CV CATH WITH PORT: ICD-10-PCS | Mod: HCNC,RT,, | Performed by: STUDENT IN AN ORGANIZED HEALTH CARE EDUCATION/TRAINING PROGRAM

## 2020-11-11 PROCEDURE — D9220A PRA ANESTHESIA: ICD-10-PCS | Mod: HCNC,,, | Performed by: ANESTHESIOLOGY

## 2020-11-11 PROCEDURE — 37000009 HC ANESTHESIA EA ADD 15 MINS: Mod: HCNC | Performed by: STUDENT IN AN ORGANIZED HEALTH CARE EDUCATION/TRAINING PROGRAM

## 2020-11-11 PROCEDURE — 25000003 PHARM REV CODE 250: Mod: HCNC | Performed by: STUDENT IN AN ORGANIZED HEALTH CARE EDUCATION/TRAINING PROGRAM

## 2020-11-11 PROCEDURE — 77001 FLUOROGUIDE FOR VEIN DEVICE: CPT | Mod: 26,HCNC,, | Performed by: STUDENT IN AN ORGANIZED HEALTH CARE EDUCATION/TRAINING PROGRAM

## 2020-11-11 PROCEDURE — C1788 PORT, INDWELLING, IMP: HCPCS | Mod: HCNC | Performed by: STUDENT IN AN ORGANIZED HEALTH CARE EDUCATION/TRAINING PROGRAM

## 2020-11-11 PROCEDURE — 37000008 HC ANESTHESIA 1ST 15 MINUTES: Mod: HCNC | Performed by: STUDENT IN AN ORGANIZED HEALTH CARE EDUCATION/TRAINING PROGRAM

## 2020-11-11 PROCEDURE — 82962 GLUCOSE BLOOD TEST: CPT | Mod: HCNC | Performed by: STUDENT IN AN ORGANIZED HEALTH CARE EDUCATION/TRAINING PROGRAM

## 2020-11-11 PROCEDURE — 71000044 HC DOSC ROUTINE RECOVERY FIRST HOUR: Mod: HCNC | Performed by: STUDENT IN AN ORGANIZED HEALTH CARE EDUCATION/TRAINING PROGRAM

## 2020-11-11 PROCEDURE — 63600175 PHARM REV CODE 636 W HCPCS: Mod: HCNC | Performed by: STUDENT IN AN ORGANIZED HEALTH CARE EDUCATION/TRAINING PROGRAM

## 2020-11-11 PROCEDURE — D9220A PRA ANESTHESIA: Mod: HCNC,,, | Performed by: ANESTHESIOLOGY

## 2020-11-11 PROCEDURE — 36561 INSERT TUNNELED CV CATH: CPT | Mod: HCNC,RT,, | Performed by: STUDENT IN AN ORGANIZED HEALTH CARE EDUCATION/TRAINING PROGRAM

## 2020-11-11 PROCEDURE — 25000003 PHARM REV CODE 250: Mod: HCNC

## 2020-11-11 PROCEDURE — 71000015 HC POSTOP RECOV 1ST HR: Mod: HCNC | Performed by: STUDENT IN AN ORGANIZED HEALTH CARE EDUCATION/TRAINING PROGRAM

## 2020-11-11 DEVICE — KIT POWERPORT SINGLE 8FR: Type: IMPLANTABLE DEVICE | Site: CHEST  WALL | Status: FUNCTIONAL

## 2020-11-11 RX ORDER — CEFAZOLIN SODIUM 1 G/3ML
INJECTION, POWDER, FOR SOLUTION INTRAMUSCULAR; INTRAVENOUS
Status: DISCONTINUED | OUTPATIENT
Start: 2020-11-11 | End: 2020-11-11

## 2020-11-11 RX ORDER — SODIUM CHLORIDE 9 MG/ML
INJECTION, SOLUTION INTRAVENOUS CONTINUOUS
Status: DISCONTINUED | OUTPATIENT
Start: 2020-11-11 | End: 2020-11-11 | Stop reason: HOSPADM

## 2020-11-11 RX ORDER — HALOPERIDOL 5 MG/ML
0.5 INJECTION INTRAMUSCULAR ONCE AS NEEDED
Status: ACTIVE | OUTPATIENT
Start: 2020-11-11 | End: 2032-04-09

## 2020-11-11 RX ORDER — ACETAMINOPHEN 500 MG
1000 TABLET ORAL ONCE
Status: COMPLETED | OUTPATIENT
Start: 2020-11-11 | End: 2020-11-11

## 2020-11-11 RX ORDER — OXYCODONE HYDROCHLORIDE 5 MG/1
5 TABLET ORAL EVERY 4 HOURS PRN
Status: DISCONTINUED | OUTPATIENT
Start: 2020-11-11 | End: 2020-11-11 | Stop reason: HOSPADM

## 2020-11-11 RX ORDER — PROPOFOL 10 MG/ML
VIAL (ML) INTRAVENOUS CONTINUOUS PRN
Status: DISCONTINUED | OUTPATIENT
Start: 2020-11-11 | End: 2020-11-11

## 2020-11-11 RX ORDER — FENTANYL CITRATE 50 UG/ML
25 INJECTION, SOLUTION INTRAMUSCULAR; INTRAVENOUS EVERY 5 MIN PRN
Status: ACTIVE | OUTPATIENT
Start: 2020-11-11

## 2020-11-11 RX ORDER — DEXMEDETOMIDINE HYDROCHLORIDE 100 UG/ML
INJECTION, SOLUTION INTRAVENOUS
Status: DISCONTINUED | OUTPATIENT
Start: 2020-11-11 | End: 2020-11-11

## 2020-11-11 RX ORDER — SODIUM CHLORIDE 9 MG/ML
INJECTION, SOLUTION INTRAVENOUS CONTINUOUS PRN
Status: DISCONTINUED | OUTPATIENT
Start: 2020-11-11 | End: 2020-11-11

## 2020-11-11 RX ORDER — CEFAZOLIN SODIUM 1 G/3ML
2 INJECTION, POWDER, FOR SOLUTION INTRAMUSCULAR; INTRAVENOUS
Status: DISCONTINUED | OUTPATIENT
Start: 2020-11-11 | End: 2020-11-11 | Stop reason: HOSPADM

## 2020-11-11 RX ORDER — HYDROMORPHONE HYDROCHLORIDE 1 MG/ML
0.2 INJECTION, SOLUTION INTRAMUSCULAR; INTRAVENOUS; SUBCUTANEOUS EVERY 5 MIN PRN
Status: ACTIVE | OUTPATIENT
Start: 2020-11-11

## 2020-11-11 RX ORDER — OXYCODONE HYDROCHLORIDE 5 MG/1
10 TABLET ORAL EVERY 4 HOURS PRN
Status: DISCONTINUED | OUTPATIENT
Start: 2020-11-11 | End: 2020-11-11 | Stop reason: HOSPADM

## 2020-11-11 RX ORDER — ONDANSETRON 2 MG/ML
4 INJECTION INTRAMUSCULAR; INTRAVENOUS ONCE AS NEEDED
Status: ACTIVE | OUTPATIENT
Start: 2020-11-11 | End: 2032-04-09

## 2020-11-11 RX ORDER — LIDOCAINE HYDROCHLORIDE 10 MG/ML
INJECTION, SOLUTION EPIDURAL; INFILTRATION; INTRACAUDAL; PERINEURAL
Status: DISCONTINUED | OUTPATIENT
Start: 2020-11-11 | End: 2020-11-11 | Stop reason: HOSPADM

## 2020-11-11 RX ORDER — PHENYLEPHRINE HYDROCHLORIDE 10 MG/ML
INJECTION INTRAVENOUS
Status: DISCONTINUED | OUTPATIENT
Start: 2020-11-11 | End: 2020-11-11

## 2020-11-11 RX ORDER — ONDANSETRON 2 MG/ML
INJECTION INTRAMUSCULAR; INTRAVENOUS
Status: DISCONTINUED | OUTPATIENT
Start: 2020-11-11 | End: 2020-11-11

## 2020-11-11 RX ORDER — FENTANYL CITRATE 50 UG/ML
INJECTION, SOLUTION INTRAMUSCULAR; INTRAVENOUS
Status: DISCONTINUED | OUTPATIENT
Start: 2020-11-11 | End: 2020-11-11

## 2020-11-11 RX ORDER — MIDAZOLAM HYDROCHLORIDE 1 MG/ML
INJECTION, SOLUTION INTRAMUSCULAR; INTRAVENOUS
Status: DISCONTINUED | OUTPATIENT
Start: 2020-11-11 | End: 2020-11-11

## 2020-11-11 RX ORDER — OXYCODONE AND ACETAMINOPHEN 5; 325 MG/1; MG/1
1 TABLET ORAL EVERY 4 HOURS PRN
Qty: 10 TABLET | Refills: 0 | Status: SHIPPED | OUTPATIENT
Start: 2020-11-11 | End: 2020-11-11 | Stop reason: SDUPTHER

## 2020-11-11 RX ORDER — HEPARIN SODIUM (PORCINE) LOCK FLUSH IV SOLN 100 UNIT/ML 100 UNIT/ML
SOLUTION INTRAVENOUS
Status: DISCONTINUED | OUTPATIENT
Start: 2020-11-11 | End: 2020-11-11 | Stop reason: HOSPADM

## 2020-11-11 RX ORDER — OXYCODONE AND ACETAMINOPHEN 5; 325 MG/1; MG/1
1 TABLET ORAL EVERY 4 HOURS PRN
Qty: 10 TABLET | Refills: 0 | Status: SHIPPED | OUTPATIENT
Start: 2020-11-11 | End: 2021-04-01

## 2020-11-11 RX ORDER — SODIUM CHLORIDE 0.9 % (FLUSH) 0.9 %
10 SYRINGE (ML) INJECTION
Status: ACTIVE | OUTPATIENT
Start: 2020-11-11

## 2020-11-11 RX ORDER — PROPOFOL 10 MG/ML
VIAL (ML) INTRAVENOUS
Status: DISCONTINUED | OUTPATIENT
Start: 2020-11-11 | End: 2020-11-11

## 2020-11-11 RX ADMIN — SODIUM CHLORIDE: 0.9 INJECTION, SOLUTION INTRAVENOUS at 08:11

## 2020-11-11 RX ADMIN — ONDANSETRON 4 MG: 2 INJECTION, SOLUTION INTRAMUSCULAR; INTRAVENOUS at 12:11

## 2020-11-11 RX ADMIN — CEFAZOLIN 2 G: 330 INJECTION, POWDER, FOR SOLUTION INTRAMUSCULAR; INTRAVENOUS at 12:11

## 2020-11-11 RX ADMIN — DEXMEDETOMIDINE HYDROCHLORIDE 24 MCG: 100 INJECTION, SOLUTION, CONCENTRATE INTRAVENOUS at 11:11

## 2020-11-11 RX ADMIN — PROPOFOL 125 MCG/KG/MIN: 10 INJECTION, EMULSION INTRAVENOUS at 11:11

## 2020-11-11 RX ADMIN — PHENYLEPHRINE HYDROCHLORIDE 100 MCG: 10 INJECTION INTRAVENOUS at 12:11

## 2020-11-11 RX ADMIN — DEXMEDETOMIDINE HYDROCHLORIDE 8 MCG: 100 INJECTION, SOLUTION, CONCENTRATE INTRAVENOUS at 11:11

## 2020-11-11 RX ADMIN — OXYCODONE 10 MG: 5 TABLET ORAL at 01:11

## 2020-11-11 RX ADMIN — MIDAZOLAM HYDROCHLORIDE 2 MG: 1 INJECTION, SOLUTION INTRAMUSCULAR; INTRAVENOUS at 11:11

## 2020-11-11 RX ADMIN — FENTANYL CITRATE 25 MCG: 50 INJECTION, SOLUTION INTRAMUSCULAR; INTRAVENOUS at 12:11

## 2020-11-11 RX ADMIN — SODIUM CHLORIDE: 0.9 INJECTION, SOLUTION INTRAVENOUS at 11:11

## 2020-11-11 RX ADMIN — SODIUM CHLORIDE, SODIUM GLUCONATE, SODIUM ACETATE, POTASSIUM CHLORIDE, MAGNESIUM CHLORIDE, SODIUM PHOSPHATE, DIBASIC, AND POTASSIUM PHOSPHATE: .53; .5; .37; .037; .03; .012; .00082 INJECTION, SOLUTION INTRAVENOUS at 12:11

## 2020-11-11 RX ADMIN — ACETAMINOPHEN 1000 MG: 500 TABLET ORAL at 09:11

## 2020-11-11 RX ADMIN — PROPOFOL 30 MG: 10 INJECTION, EMULSION INTRAVENOUS at 11:11

## 2020-11-11 NOTE — BRIEF OP NOTE
[facility]  Brief Operative Note    Surgery Date: 11/11/2020     Surgeon(s) and Role:     * Josefa Caceres MD - Primary     * Lester Tabares MD - Resident - Assisting    Pre-op Diagnosis:  NSCLC of right lung [C34.91]    Post-op Diagnosis:  Post-Op Diagnosis Codes:     * NSCLC of right lung [C34.91]    Procedure(s) (LRB):  [id number]-CATH, RIGHT (Right)    Anesthesia: General    Description of the findings of the procedure(s): Right IJ port placed under fluroscopic guidance    Estimated Blood Loss: minimal         Specimens:   Specimen (12h ago, onward)    None            Discharge Note    OUTCOME: Patient tolerated treatment/procedure well without complication and is now ready for discharge.    DISPOSITION: Home or Self Care    FINAL DIAGNOSIS:  NSCLC of right lung    FOLLOWUP: In clinic    DISCHARGE INSTRUCTIONS:    Discharge Procedure Orders   Diet general     Other restrictions (specify):   Scheduling Instructions: May resume diet as tolerated.   May shower in 48 hours after surgery; no baths or submerging in water (swimming,etc) for at least 2 weeks  Keep incision clean with soap and water.  Monitor for temp > 101.4, bleeding, redness, purulent drainage, or any extreme pain. If any occur, please call MD or go to ER.  Please resume all home meds and take newly prescribed meds.  For pain control you will be prescribed narcotic pain medication, please take with food and with a stool softener. You may find over the counter medication is enough to control your pain.   Follow up with Dr. Caceres as needed in clinic     Call MD for:  temperature >100.4     Call MD for:  persistent nausea and vomiting     Call MD for:  severe uncontrolled pain     Call MD for:  difficulty breathing, headache or visual disturbances

## 2020-11-11 NOTE — DISCHARGE INSTRUCTIONS
Vascular Access Port Implantation   Port implantation is surgery to place (implant) a port under the skin. For vascular access, it is placed into a vein. The port allows medicines or nutrition to be sent right into your bloodstream. Blood can also be taken or given through the port. During the procedure, a long, thin tube called a catheter is threaded into one of your large veins. The tube is then attached to the port. This usually sits under the skin of your chest and causes a small bump. To use the port, a special needle is passed through your skin and into the port. The needle can stay in your skin for up to 7 days, if needed. A port can stay in place for weeks or months or longer.    Why is a vascular access port needed?  A vascular access port may allow healthcare providers to give you:  · Chemotherapy or other cancer-fighting drugs  · IV treatments, such as antibiotics or nutrition  · Hemodialysis (for kidney failure)  The port may also be used to draw blood.  Before the procedure  Follow any instructions you are given on how to prepare.  Tell your provider about any medicines you are taking. This includes:  · All prescription medicines  · Over-the-counter medicines such as aspirin or ibuprofen  · Herbs, vitamins, and other supplements  Also be sure your provider knows:  · If you are pregnant or think you may be pregnant  · If you are allergic to any medicines or substances, especially local anesthetics or iodine  · Your full medical history, including why you will need the port  · If you plan on doing any contact sports  During the procedure  · Before the procedure, an IV may be put into a vein in your arm or hand. This gives you fluids and medicines. You may be given medicine through the IV to help you relax during the procedure. This is called sedation. But some surgeons place ports using general anesthesia.  · The chest is used most often for the port. In some cases, your belly (abdomen) or arm will be  used instead.  · The skin over the insertion area is numbed with local anesthetic.  · Ultrasound or X-rays are used to help the healthcare provider guide the catheter into the proper location during the procedure.  · A cut (incision) is made in the skin where the port will be placed. A small pocket for the port is formed under the skin.  · A second small incision is made in the skin near the first incision. A tunnel under the skin is created. The catheter is put through the tunnel and into the blood vessel.  · The skin is closed over the port. It is held shut with stitches (sutures) or surgical glue or tape. The second small incision is also closed.  · A chest X-ray may be done to make sure the port is placed properly.  After the procedure  You may be taken to a recovery room where youll recover from the sedation. Nurses will check on you as you rest. If you have pain, nurses can give you medicine. If you are not staying in the hospital overnight, you will be sent home a few hours after the procedure is done. A healthcare provider will tell you when you can go home. An adult family member or friend will need to drive you home.  Recovering at home  · Take pain medicine as directed by your healthcare provider.  · Take it easy for 24 hours after the procedure. Avoid physical activity and heavy lifting until your healthcare provider says its OK.  · Keep the port clean and dry. Ask when you can shower again. You will need to keep the port dry by covering it when you shower.  · Care for the insertion site as you are directed.  · Dont swim, bathe, or do other activities that cause water to cover the insertion site.  · To keep the port from getting blocked with blood clots, flush it as often as directed. You should be shown the proper way to flush the port before you go home. It is important to follow these directions.     Risks and possible complications of implantation  · Bleeding  · Infection of the insertion  site  · Damage to a blood vessel  · Nerve injury or irritation  · Collapsed lung (for chest port placements)  · Skin breakdown over the port  Risks and possible complications of having a port  · Blocked  port or catheter  · Leakage or breakage of the port or catheter  · The port moves out of position  · Blood clot  · Skin or bloodstream infection  · Skin breakdown over the port      When to seek medical care  Call your healthcare provider right away if you have any of the following:  · A fever of 100.4°F (38.0°C) or higher  · You can't access or use the port properly  · You can't flush the port or get a blood return  · The skin near the port is red, warm, swollen, or broken  · You have shoulder pain on the side where the port is located  · You feel a heart flutter or racing heart   · Swollen arm, if the port is placed in your arm   Date Last Reviewed: 7/1/2016  © 2093-1854 [employer]. [address] [place] [address], [place] [address]. All rights reserved. This information is not intended as a substitute for professional medical care. Always follow your healthcare professional's instructions.

## 2020-11-11 NOTE — INTERVAL H&P NOTE
The patient has been examined and the H&P has been reviewed:    I concur with the findings and no changes have occurred since H&P was written.    Surgery risks, benefits and alternative options discussed and understood by patient/family.          Active Hospital Problems    Diagnosis  POA    NSCLC of right lung [C34.91]  Yes      Resolved Hospital Problems   No resolved problems to display.

## 2020-11-11 NOTE — TRANSFER OF CARE
Anesthesia Transfer of Care Note    Patient: Hannah Montoya    Procedure(s) Performed: Procedure(s) (LRB):  [id number], RIGHT (Right)    Patient location: [facility]    Anesthesia Type: general    Transport from OR: Transported from OR on 6-10 L/min O2 by face mask with adequate spontaneous ventilation    Post pain: adequate analgesia    Post assessment: no apparent anesthetic complications and tolerated procedure well    Post vital signs: stable    Level of consciousness: awake and alert    Nausea/Vomiting: no nausea/vomiting    Complications: none    Transfer of care protocol was followed      Last vitals:   Visit Vitals  /60   LMP  (LMP Unknown)   Breastfeeding No

## 2020-11-11 NOTE — OP NOTE
[facility]  General Surgery  Operative Note    DATE: 11/11/2020    PREOPERATIVE DIAGNOSIS: Non-small cell lung cancer    POSTOPERATIVE DIAGNOSIS: Non-small cell lung cancer    PROCEDURE PERFORMED: Right internal jugular vein MediPort placement under ultrasound and fluoroscopic guidance    ATTENDING SURGEON: Josefa Caceres M.D.     HOUSESTAFF SURGEON: Lester Tbaares M.D. (RES)     ANESTHESIA: Monitored anesthesia care plus local.     ESTIMATED BLOOD LOSS: 10 mL     FINDINGS: A right internal jugular port placed with tip at junction of superior   vena cava and right atrium.     SPECIMEN: None.     DRAINS: None.     COMPLICATIONS: None.     INDICATIONS: Hannah Montoya is a 65 y.o.female recently diagnosed with squamous cell cancer of the right lung. General Surgery was consulted for port placement for chemotherapy. We recommended a right internal jugular vein port and the patient agreed to proceed. The patient did sign informed consent and expressed understanding of the risks and benefits of surgery.     OPERATIVE PROCEDURE: The patient was identified in Preoperative Holding,brought back to the Operating Room, and placed supine on the operating table and padded appropriately. Monitors were applied. Monitored anesthesia care was initiated. The right neck and chest were prepped and draped in the standard sterile surgical fashion. A timeout was performed and all team members present agreed this was the correct procedure on the correct patient. We also confirmed administration of appropriate preoperative antibiotics.     After administration of appropriate local anesthesia, the right internal jugular vein was cannulated on the first pass with a hollow-bore needle. A guidewire was placed and confirmed to be in correct position by fluoroscopy. An appropriate area for the pocket was chosen, and the incision was anesthetized with lidocaine. A 4 cm transverse skin incision was made.  Subcutaneous tissue was divided with Bovie electrocautery.  The  catheter was measured for length appropriately and cut. Additional local was administered for the pocket for the port and then the port pocket was created  with a mixture of Bovie electrocautery and blunt dissection. The port was tunneled from the incision to the cannulation site with the tunneling device. The port was secured to the investing pectoral fascia with two 2-0 Prolene sutures. Under fluoroscopic guidance, the split sheath and dilator were placed over the guidewire, and the guidewire and dilator were then removed. The catheter was placed down the split sheath and the sheath was split and peeled away. Fluoroscopy confirmed appropriate position of the catheter, which aspirated and flushed easily. Heparinized saline was instilled in the catheter. The skin incision was closed in layers with deep buried interrupted 3-0 Vicryl and  running subcuticular 4-0 Monocryl. The cannulation incision was closed with a buried interrupted 4-0 Monocryl stitch. A sterile dressing was applied. The patient was transported to the Recovery Room in stable condition. All sponge, instrument and needle counts were correct at the end of the procedure. I was present and scrubbed for the entire case.     Patient tolerated the procedure well. A chest x-ray will be performed in the recovery room.

## 2020-11-11 NOTE — PROGRESS NOTES
Patient has been identified as having an implanted cardiac rhythm device (CRD); the implanted device is a [employer] defibrillator.      No noted pacer dependency.       DEFIBRILLATORS/NON-DEPENDENT ANY LOCATION    Per protocol,a magnet should be applied directly over the implanted device during entire surgical procedure when electrocautery will be used.    If no electrocautery is planned, magnet application is not needed.    For additional questions, please contact the Arrhythmia Department at Ext [phone].

## 2020-11-12 NOTE — ANESTHESIA POSTPROCEDURE EVALUATION
Anesthesia Post Evaluation    Patient: Hannah Montoya    Procedure(s) Performed: Procedure(s) (LRB):  [id number]-[id number], RIGHT (Right)    Final Anesthesia Type: general    Patient location during evaluation: [facility]  Patient participation: Yes- Able to Participate  Level of consciousness: awake and alert  Post-procedure vital signs: reviewed and stable  Pain management: adequate  Airway patency: patent    PONV status at discharge: No PONV  Anesthetic complications: no      Cardiovascular status: blood pressure returned to baseline  Respiratory status: unassisted  Hydration status: euvolemic  Follow-up not needed.          Vitals Value Taken Time   /60 11/11/20 1402   Temp 36.6 °C (97.9 °F) 11/11/20 1400   Pulse 64 11/11/20 1403   Resp 22 11/11/20 1403   SpO2 92 % 11/11/20 1402   Vitals shown include unvalidated device data.      No case tracking events are documented in the log.      Pain/James Score: Pain Rating Prior to Med Admin: 7 (11/11/2020  2:20 PM)  Pain Rating Post Med Admin: 0 (11/11/2020  2:20 PM)  James Score: 9 (11/11/2020 12:53 PM)

## 2020-11-13 ENCOUNTER — PATIENT MESSAGE (OUTPATIENT)
Dept: RADIATION ONCOLOGY | Facility: CLINIC | Age: 65
End: 2020-11-13

## 2020-11-16 ENCOUNTER — OFFICE VISIT (OUTPATIENT)
Dept: HEMATOLOGY/ONCOLOGY | Facility: CLINIC | Age: 65
End: 2020-11-16
Payer: MEDICARE

## 2020-11-16 ENCOUNTER — LAB VISIT (OUTPATIENT)
Dept: LAB | Facility: HOSPITAL | Age: 65
End: 2020-11-16
Attending: INTERNAL MEDICINE
Payer: MEDICARE

## 2020-11-16 ENCOUNTER — RESEARCH ENCOUNTER (OUTPATIENT)
Dept: RESEARCH | Facility: HOSPITAL | Age: 65
End: 2020-11-16

## 2020-11-16 VITALS
SYSTOLIC BLOOD PRESSURE: 109 MMHG | TEMPERATURE: 98 F | WEIGHT: 189.81 LBS | HEART RATE: 80 BPM | RESPIRATION RATE: 16 BRPM | BODY MASS INDEX: 34.93 KG/M2 | HEIGHT: 62 IN | OXYGEN SATURATION: 95 % | DIASTOLIC BLOOD PRESSURE: 59 MMHG

## 2020-11-16 DIAGNOSIS — J41.0 SIMPLE CHRONIC BRONCHITIS: ICD-10-CM

## 2020-11-16 DIAGNOSIS — Z95.810 CARDIAC RESYNCHRONIZATION THERAPY DEFIBRILLATOR (CRT-D) IN PLACE: ICD-10-CM

## 2020-11-16 DIAGNOSIS — Z00.6 CLINICAL TRIAL PARTICIPANT: ICD-10-CM

## 2020-11-16 DIAGNOSIS — I10 ESSENTIAL HYPERTENSION: ICD-10-CM

## 2020-11-16 DIAGNOSIS — E11.9 TYPE 2 DIABETES MELLITUS WITHOUT COMPLICATION, WITHOUT LONG-TERM CURRENT USE OF INSULIN: ICD-10-CM

## 2020-11-16 DIAGNOSIS — I44.7 LEFT BUNDLE-BRANCH BLOCK: ICD-10-CM

## 2020-11-16 DIAGNOSIS — C34.91 NSCLC OF RIGHT LUNG: Primary | ICD-10-CM

## 2020-11-16 DIAGNOSIS — C34.91 NSCLC OF RIGHT LUNG: ICD-10-CM

## 2020-11-16 DIAGNOSIS — I42.0 DILATED CARDIOMYOPATHY: ICD-10-CM

## 2020-11-16 DIAGNOSIS — Z00.6 EXAMINATION OF PARTICIPANT IN CLINICAL TRIAL: ICD-10-CM

## 2020-11-16 DIAGNOSIS — Z85.3 HISTORY OF BREAST CANCER IN FEMALE: ICD-10-CM

## 2020-11-16 LAB
ALBUMIN SERPL BCP-MCNC: 3.6 G/DL (ref 3.5–5.2)
ALP SERPL-CCNC: 57 U/L (ref 55–135)
ALT SERPL W/O P-5'-P-CCNC: 19 U/L (ref 10–44)
ANION GAP SERPL CALC-SCNC: 13 MMOL/L (ref 8–16)
AST SERPL-CCNC: 23 U/L (ref 10–40)
BASOPHILS # BLD AUTO: 0.05 K/UL (ref 0–0.2)
BASOPHILS NFR BLD: 0.8 % (ref 0–1.9)
BILIRUB SERPL-MCNC: 0.5 MG/DL (ref 0.1–1)
BUN SERPL-MCNC: 17 MG/DL (ref 8–23)
CALCIUM SERPL-MCNC: 10.1 MG/DL (ref 8.7–10.5)
CHLORIDE SERPL-SCNC: 101 MMOL/L (ref 95–110)
CO2 SERPL-SCNC: 26 MMOL/L (ref 23–29)
CREAT SERPL-MCNC: 1 MG/DL (ref 0.5–1.4)
DIFFERENTIAL METHOD: ABNORMAL
DRUG STUDY SPECIMEN TYPE: NORMAL
DRUG STUDY TEST NAME: NORMAL
DRUG STUDY TEST RESULT: NORMAL
EOSINOPHIL # BLD AUTO: 0.1 K/UL (ref 0–0.5)
EOSINOPHIL NFR BLD: 2 % (ref 0–8)
ERYTHROCYTE [DISTWIDTH] IN BLOOD BY AUTOMATED COUNT: 14.6 % (ref 11.5–14.5)
EST. GFR  (AFRICAN AMERICAN): >60 ML/MIN/1.73 M^2
EST. GFR  (NON AFRICAN AMERICAN): >60 ML/MIN/1.73 M^2
GLUCOSE SERPL-MCNC: 181 MG/DL (ref 70–110)
HCT VFR BLD AUTO: 38.7 % (ref 37–48.5)
HGB BLD-MCNC: 12 G/DL (ref 12–16)
IMM GRANULOCYTES # BLD AUTO: 0.03 K/UL (ref 0–0.04)
IMM GRANULOCYTES NFR BLD AUTO: 0.5 % (ref 0–0.5)
LYMPHOCYTES # BLD AUTO: 1.2 K/UL (ref 1–4.8)
LYMPHOCYTES NFR BLD: 18.8 % (ref 18–48)
MCH RBC QN AUTO: 28 PG (ref 27–31)
MCHC RBC AUTO-ENTMCNC: 31 G/DL (ref 32–36)
MCV RBC AUTO: 90 FL (ref 82–98)
MONOCYTES # BLD AUTO: 0.7 K/UL (ref 0.3–1)
MONOCYTES NFR BLD: 11.1 % (ref 4–15)
NEUTROPHILS # BLD AUTO: 4.4 K/UL (ref 1.8–7.7)
NEUTROPHILS NFR BLD: 66.8 % (ref 38–73)
NRBC BLD-RTO: 0 /100 WBC
PLATELET # BLD AUTO: 213 K/UL (ref 150–350)
PMV BLD AUTO: 12.2 FL (ref 9.2–12.9)
POTASSIUM SERPL-SCNC: 4.1 MMOL/L (ref 3.5–5.1)
PROT SERPL-MCNC: 7.6 G/DL (ref 6–8.4)
RBC # BLD AUTO: 4.28 M/UL (ref 4–5.4)
SODIUM SERPL-SCNC: 140 MMOL/L (ref 136–145)
WBC # BLD AUTO: 6.58 K/UL (ref 3.9–12.7)

## 2020-11-16 PROCEDURE — 3051F PR MOST RECENT HEMOGLOBIN A1C LEVEL 7.0 - < 8.0%: ICD-10-PCS | Mod: HCNC,CPTII,S$GLB, | Performed by: INTERNAL MEDICINE

## 2020-11-16 PROCEDURE — 3078F DIAST BP <80 MM HG: CPT | Mod: HCNC,CPTII,S$GLB, | Performed by: INTERNAL MEDICINE

## 2020-11-16 PROCEDURE — 3074F PR MOST RECENT SYSTOLIC BLOOD PRESSURE < 130 MM HG: ICD-10-PCS | Mod: HCNC,CPTII,S$GLB, | Performed by: INTERNAL MEDICINE

## 2020-11-16 PROCEDURE — 85025 COMPLETE CBC W/AUTO DIFF WBC: CPT | Mod: HCNC

## 2020-11-16 PROCEDURE — 99214 PR OFFICE/OUTPT VISIT, EST, LEVL IV, 30-39 MIN: ICD-10-PCS | Mod: HCNC,S$GLB,, | Performed by: INTERNAL MEDICINE

## 2020-11-16 PROCEDURE — 99999 PR PBB SHADOW E&M-EST. PATIENT-LVL IV: CPT | Mod: PBBFAC,HCNC,, | Performed by: INTERNAL MEDICINE

## 2020-11-16 PROCEDURE — 3074F SYST BP LT 130 MM HG: CPT | Mod: HCNC,CPTII,S$GLB, | Performed by: INTERNAL MEDICINE

## 2020-11-16 PROCEDURE — 77301 RADIOTHERAPY DOSE PLAN IMRT: CPT | Mod: 26,HCNC,, | Performed by: RADIOLOGY

## 2020-11-16 PROCEDURE — 1126F AMNT PAIN NOTED NONE PRSNT: CPT | Mod: HCNC,S$GLB,, | Performed by: INTERNAL MEDICINE

## 2020-11-16 PROCEDURE — 99214 OFFICE O/P EST MOD 30 MIN: CPT | Mod: HCNC,S$GLB,, | Performed by: INTERNAL MEDICINE

## 2020-11-16 PROCEDURE — 1126F PR PAIN SEVERITY QUANTIFIED, NO PAIN PRESENT: ICD-10-PCS | Mod: HCNC,S$GLB,, | Performed by: INTERNAL MEDICINE

## 2020-11-16 PROCEDURE — 80053 COMPREHEN METABOLIC PANEL: CPT | Mod: HCNC,Q1

## 2020-11-16 PROCEDURE — 99999 PR PBB SHADOW E&M-EST. PATIENT-LVL IV: ICD-10-PCS | Mod: PBBFAC,HCNC,, | Performed by: INTERNAL MEDICINE

## 2020-11-16 PROCEDURE — 3078F PR MOST RECENT DIASTOLIC BLOOD PRESSURE < 80 MM HG: ICD-10-PCS | Mod: HCNC,CPTII,S$GLB, | Performed by: INTERNAL MEDICINE

## 2020-11-16 PROCEDURE — 99000 SPECIMEN HANDLING OFFICE-LAB: CPT | Mod: HCNC

## 2020-11-16 PROCEDURE — 77301 PR  INTEN MOD RADIOTHER PLAN W/DOSE VOL HIST: ICD-10-PCS | Mod: 26,HCNC,, | Performed by: RADIOLOGY

## 2020-11-16 PROCEDURE — 3051F HG A1C>EQUAL 7.0%<8.0%: CPT | Mod: HCNC,CPTII,S$GLB, | Performed by: INTERNAL MEDICINE

## 2020-11-16 PROCEDURE — 3008F PR BODY MASS INDEX (BMI) DOCUMENTED: ICD-10-PCS | Mod: HCNC,CPTII,S$GLB, | Performed by: INTERNAL MEDICINE

## 2020-11-16 PROCEDURE — 36415 COLL VENOUS BLD VENIPUNCTURE: CPT | Mod: HCNC,Q1

## 2020-11-16 PROCEDURE — 77301 RADIOTHERAPY DOSE PLAN IMRT: CPT | Mod: TC,HCNC | Performed by: RADIOLOGY

## 2020-11-16 PROCEDURE — 3008F BODY MASS INDEX DOCD: CPT | Mod: HCNC,CPTII,S$GLB, | Performed by: INTERNAL MEDICINE

## 2020-11-16 RX ORDER — HEPARIN 100 UNIT/ML
500 SYRINGE INTRAVENOUS
Status: CANCELLED | OUTPATIENT
Start: 2020-11-18

## 2020-11-16 RX ORDER — DIPHENHYDRAMINE HYDROCHLORIDE 50 MG/ML
50 INJECTION INTRAMUSCULAR; INTRAVENOUS ONCE AS NEEDED
Status: CANCELLED | OUTPATIENT
Start: 2020-11-18

## 2020-11-16 RX ORDER — FAMOTIDINE 10 MG/ML
20 INJECTION INTRAVENOUS
Status: CANCELLED | OUTPATIENT
Start: 2020-11-18

## 2020-11-16 RX ORDER — SODIUM CHLORIDE 0.9 % (FLUSH) 0.9 %
10 SYRINGE (ML) INJECTION
Status: CANCELLED | OUTPATIENT
Start: 2020-11-18

## 2020-11-16 RX ORDER — LIDOCAINE AND PRILOCAINE 25; 25 MG/G; MG/G
CREAM TOPICAL
Qty: 30 G | Refills: 3 | Status: SHIPPED | OUTPATIENT
Start: 2020-11-16 | End: 2021-04-01

## 2020-11-16 RX ORDER — EPINEPHRINE 0.3 MG/.3ML
0.3 INJECTION SUBCUTANEOUS ONCE AS NEEDED
Status: CANCELLED | OUTPATIENT
Start: 2020-11-18

## 2020-11-16 NOTE — PROGRESS NOTES
Protocol: Randomized Phase III Trial of [id number] (Durvalumab) as Concurrent and Consolidative Therapy or Consolidative Therapy Alone for Unresectable Stage 3 NSCLC   Protocol # [id number]          IRB # 2020.326  PI: Carmen Akhtar MD  Treating Physician: Javi Avina MD  Study ID #: [id number]  Assigned ARM: ARM B/Investigator Regimen Option# 3 Concurrent Chemo/RT with Carboplatin and Taxol    November 16, 2020    Cycle 1 MD visit    Patient seen in clinic today for her C1 MD visit. She is anxious regarding diagnosis and starting chemotherapy. Reassurance offered. Labs and H&P completed. Con meds and AEs reviewed.     Current AEs    1. Dyspnea- Grade 1- Patient has a history of COPD requiring the use of multiple inhalers. She has dyspnea on moderate exertion and requires rescue inhaler PRN.  2. Anxiety- Grade 2- Patient takes Wellbutrin and Zoloft.   3. Depression- Grade 2- Patient takes Wellbutrin and Zoloft.  4. Hypertension- Grade 2 (intermittent)-  Patient states that her SBP is usually around 140 and her DBP is usually around 80. Her BP in clinic today is 109/59. She takes Coreg, Lisinopril, and Lasix.   5. Hyperglycemia- Grade 2- Patient has history of DM Type II. She takes Metformin for this.   6. Pain- Grade 1- Patient has intermittent mild pain to surgical site and occasional headaches.  7. Diarrhea-(not graded/baseline issue) Patient has loose watery stools after taking her Metformin. She states that she usually has about 4 loose stools in the AM and 2 loose stools in the PM. Her baseline # of stools is about 6 a day. Dr. Avina made aware. This should not be an issue since it is relted to her Metformin. She controls this with Imodium PRN. Will monitor. Patient educated on s/s to notify physician of.   8. Anorexia- Grade 2- Patient states that she has had a poor appetite the last week and has lost about 8 pounds since her last visit. She attributes this to her nerves. Nutrition consult offered by Dr. Avina.  "Patient would like to try Glucerna prior to nutrition consult. This has improved since last week per patient.   9. Anemia- Grade 1 (baseline issue/intermittent) Hgb is 12.0 g/dl on today's labs.   10. Fatigue- Grade 2 (baseline issue/intermittent)- She notes that she has been feeling very tired lately. She took a 4 hour nap yesterday, woke up, went back to bed, and still slept all night. She is still able to perform her ADLs, but "lacks motivation". She has been feeling this way for about a week.     Labs and AEs are within parameters for treatment. Dosing timeout performed with Dr. Avina. Taxol dosage calculated with screening weight 87.2 kg  (BSA 1.95 m2) per protocol. Carboplatin calculated with screening weight and C1D1 labs per study chair Dr. Trujillo's protocol clarification email. Orders signed by MD. Patient will be infused tomorrow and will have radiation following infusion. Radiation department to assist patient with scheduling future radiation treatment plans. Radiation treatment plan pending approval at this time. Reached out to [facility] on Friday and was told that the case would be reviewed on Monday (today). Will follow up with Marva at [facility].     Patient taken on a tour of the chemotherapy infusion suite and radiation oncology department. She knows where she needs to be for all future appointments. She has my contact info as well as the clinic number should she have any questions.     Of note, patient consented yes to optional specimens. Prior to treatment specimens collected with labs today and shipped per  Stephen Shankar. [employer] Tracking # [id number]. Tissue will be shipped once it is received back from [place].                   "

## 2020-11-16 NOTE — TELEPHONE ENCOUNTER
----- Message from Livan Molina sent at 11/16/2020  2:17 PM CST -----  Contact: [facility]  Refill or New Rx: Refill     RX Name and Strength: cream to put on pt port for chemo    Preferred Pharmacy with phone number:  [facility]/pharmacy #[id number] - [place] - [address]  [address] [address]  Phone: [phone] Fax: [phone]      dditional Information: Info was given to the pharmacy by the pt . Pharmacy is still awaiting the script, don't know the medication name.

## 2020-11-16 NOTE — PROGRESS NOTES
ONCOLOGY FOLLOW UP VISIT.     Reason for visit: Initiation of treatment on [id number] clinical trial for stage IIIA NSCLC    Best Contact Phone Number(s): [phone] (home)      Cancer/Stage/TNM:   Cancer Staging  NSCLC of right lung  Staging form: Lung, AJCC 8th Edition  - Clinical stage from 9/29/2020: Stage IIIA (cT3, cN1, cM0) - Signed by Ramo Tucker MD on 10/24/2020       Oncology History   NSCLC of right lung   9/29/2020 Cancer Staged    Staging form: Lung, AJCC 8th Edition  - Clinical stage from 9/29/2020: Stage IIIA (cT3, cN1, cM0)     10/14/2020 Initial Diagnosis    NSCLC of right lung          Interval History:   Today patient reports no new symptoms.  She has some SINGH since surgery, but improves with rescue inhaler.  She does have some fatigue over last 2 days, but improves with rest - grade 1 baseline fatigue.    ROS:  Review of Systems   Constitutional: Positive for fatigue. Negative for activity change, appetite change, chills, fever and unexpected weight change.   HENT: Negative for mouth sores, nosebleeds and sore throat.    Eyes: Negative for visual disturbance.   Respiratory: Positive for shortness of breath (SINGH). Negative for cough and wheezing.    Cardiovascular: Negative for chest pain, palpitations and leg swelling.   Gastrointestinal: Negative for abdominal distention, abdominal pain, blood in stool and diarrhea.   Endocrine: Negative for cold intolerance and heat intolerance.   Genitourinary: Positive for frequency. Negative for dysuria and flank pain.   Musculoskeletal: Negative for arthralgias, back pain, joint swelling and myalgias.   Skin: Negative for color change, rash and wound.   Neurological: Negative for dizziness, light-headedness and headaches.   Hematological: Negative for adenopathy. Does not bruise/bleed easily.   Psychiatric/Behavioral: The patient is nervous/anxious.       A complete 12-point review of systems was reviewed and is negative except as mentioned above.      Past Medical History:   Past Medical History:   Diagnosis Date    AICD (automatic cardioverter/defibrillator) present     Asthma     bronchitis in past    Breast cancer 2016    right    Cardiac pacemaker     Cardiomyopathy     COPD (chronic obstructive pulmonary disease)     Diabetes mellitus, type 2     Hyperglycemia     Hyperlipidemia     Hypertension     Malignant neoplasm of overlapping sites of female breast 2/12/2016    Nuclear sclerosis of both eyes 8/12/2020    Respiratory distress 3/12/2020        Allergies:   Review of patient's allergies indicates:   Allergen Reactions    Adhesive Rash     tegaderm burns and blistered skin        Medications:   Current Outpatient Medications   Medication Sig Dispense Refill    albuterol sulfate (PROAIR RESPICLICK) 90 mcg/actuation AePB Inhale 2 puffs into the lungs every 4 (four) hours as needed. Rescue 1 each 5    albuterol-ipratropium (DUO-NEB) 2.5 mg-0.5 mg/3 mL nebulizer solution Take 3 mLs by nebulization every 6 (six) hours while awake. Rescue 30 mL 5    atorvastatin (LIPITOR) 10 MG tablet Take 1 tablet (10 mg total) by mouth once daily. (Patient taking differently: Take 10 mg by mouth every evening. ) 90 tablet 3    buPROPion (WELLBUTRIN XL) 150 MG TB24 tablet TAKE 1 TABLET BY MOUTH EVERY DAY (Patient taking differently: Take 150 mg by mouth every morning. ) 90 tablet 1    carvediloL (COREG) 25 MG tablet TAKE 1 TABLET(25 MG) BY MOUTH TWICE DAILY 180 tablet 3    cetirizine (ZYRTEC) 10 MG tablet Take 10 mg by mouth every evening.       dexAMETHasone (DECADRON) 4 MG Tab Take 2 tablets (8 mg total) by mouth once daily. Take as directed on days 2 and 3 of your chemotherapy cycle. 24 tablet 1    fluticasone-salmeterol diskus inhaler 250-50 mcg INHALE 1 PUFF INTO THE LUNGS 2 (TWO) TIMES DAILY. (Patient taking differently: INHALE 1 PUFF INTO THE LUNGS 2 (TWO) TIMES DAILY.) 60 each 5    furosemide (LASIX) 40 MG tablet Take 1 tablet (40 mg total)  "by mouth once daily. TAKE 1 TABLET(40 MG) BY MOUTH TWICE DAILY 90 tablet 1    metFORMIN (GLUCOPHAGE) 500 MG tablet Take 1 tablet (500 mg total) by mouth 2 (two) times daily. 180 tablet 1    multivitamin (ONE DAILY MULTIVITAMIN) per tablet Take 1 tablet by mouth once daily.      ondansetron (ZOFRAN-ODT) 8 MG TbDL Take 1 tablet (8 mg total) by mouth every 12 (twelve) hours as needed. 30 tablet 1    oxyCODONE-acetaminophen (PERCOCET) 5-325 mg per tablet Take 1 tablet by mouth every 4 (four) hours as needed for Pain. 10 tablet 0    prochlorperazine (COMPAZINE) 5 MG tablet Take 1 tablet (5 mg total) by mouth every 6 (six) hours as needed for Nausea. 30 tablet 1    sertraline (ZOLOFT) 100 MG tablet Take 1 tablet (100 mg total) by mouth every evening. 90 tablet 1    SPIRIVA WITH HANDIHALER 18 mcg inhalation capsule INHALE THE CONTENTS OF 1 CAPSULE EVERY DAY 90 capsule 0    TRUETEST TEST STRIPS Strp test once EVERY MORNING 100 strip 1    oxyCODONE (ROXICODONE) 5 MG immediate release tablet Take 1 tablet (5 mg total) by mouth every 4 (four) hours as needed for Pain. (Patient not taking: Reported on 11/9/2020) 30 tablet 0     No current facility-administered medications for this visit.      Facility-Administered Medications Ordered in Other Visits   Medication Dose Route Frequency Provider Last Rate Last Dose    fentaNYL injection 25 mcg  25 mcg Intravenous Q5 Min PRN Keesha Martins MD        haloperidol lactate injection 0.5 mg  0.5 mg Intravenous Once PRN Keesha Martins MD        HYDROmorphone injection 0.2 mg  0.2 mg Intravenous Q5 Min PRN Keesha Martins MD        ondansetron injection 4 mg  4 mg Intravenous Once PRN Keesha Martins MD        sodium chloride 0.9% flush 10 mL  10 mL Intravenous PRN Keesha Martins MD            Physical Exam:   BP (!) 109/59 (BP Location: Right arm, Patient Position: Sitting, BP Method: Large (Automatic))   Pulse 80   Temp 98 °F (36.7 °C) (Oral)   Resp 16   Ht 5' 2" (1.575 m)   Wt 86.1 kg (189 " lb 13.1 oz)   LMP  (LMP Unknown)   SpO2 95%   BMI 34.72 kg/m²      ECOG Performance Status: (foot note - ECOG PS provided by Eastern Cooperative Oncology Group) 0 - Asymptomatic    Physical Exam  Constitutional:       Appearance: She is well-developed.   HENT:      Head: Normocephalic and atraumatic.   Eyes:      Conjunctiva/sclera: Conjunctivae normal.      Pupils: Pupils are equal, round, and reactive to light.   Neck:      Musculoskeletal: Normal range of motion and neck supple.   Pulmonary:      Effort: Pulmonary effort is normal. No respiratory distress.   Abdominal:      General: There is no distension.      Palpations: Abdomen is soft.   Musculoskeletal: Normal range of motion.         General: No swelling.   Lymphadenopathy:      Cervical: No cervical adenopathy.   Skin:     General: Skin is warm and dry.   Neurological:      General: No focal deficit present.      Mental Status: She is alert and oriented to person, place, and time.   Psychiatric:         Mood and Affect: Mood normal.         Behavior: Behavior normal.           Labs:   Recent Results (from the past 48 hour(s))   DRUG STUDY SENDOUT, [facility]. Blood    Collection Time: 11/16/20  9:48 AM   Result Value Ref Range    Drug Study Test Name Drug Study     Drug Study Specimen Type BLOOD     Drug Study Test Result Result sent directly to physician    CBC W/ AUTO DIFFERENTIAL    Collection Time: 11/16/20  9:48 AM   Result Value Ref Range    WBC 6.58 3.90 - 12.70 K/uL    RBC 4.28 4.00 - 5.40 M/uL    Hemoglobin 12.0 12.0 - 16.0 g/dL    Hematocrit 38.7 37.0 - 48.5 %    MCV 90 82 - 98 fL    MCH 28.0 27.0 - 31.0 pg    MCHC 31.0 (L) 32.0 - 36.0 g/dL    RDW 14.6 (H) 11.5 - 14.5 %    Platelets 213 150 - 350 K/uL    MPV 12.2 9.2 - 12.9 fL    Immature Granulocytes 0.5 0.0 - 0.5 %    Gran # (ANC) 4.4 1.8 - 7.7 K/uL    Immature Grans (Abs) 0.03 0.00 - 0.04 K/uL    Lymph # 1.2 1.0 - 4.8 K/uL    Mono # 0.7 0.3 - 1.0 K/uL    Eos # 0.1 0.0 - 0.5 K/uL    Baso # 0.05  0.00 - 0.20 K/uL    nRBC 0 0 /100 WBC    Gran % 66.8 38.0 - 73.0 %    Lymph % 18.8 18.0 - 48.0 %    Mono % 11.1 4.0 - 15.0 %    Eosinophil % 2.0 0.0 - 8.0 %    Basophil % 0.8 0.0 - 1.9 %    Differential Method Automated    COMPREHENSIVE METABOLIC PANEL    Collection Time: 11/16/20  9:48 AM   Result Value Ref Range    Sodium 140 136 - 145 mmol/L    Potassium 4.1 3.5 - 5.1 mmol/L    Chloride 101 95 - 110 mmol/L    CO2 26 23 - 29 mmol/L    Glucose 181 (H) 70 - 110 mg/dL    BUN 17 8 - 23 mg/dL    Creatinine 1.0 0.5 - 1.4 mg/dL    Calcium 10.1 8.7 - 10.5 mg/dL    Total Protein 7.6 6.0 - 8.4 g/dL    Albumin 3.6 3.5 - 5.2 g/dL    Total Bilirubin 0.5 0.1 - 1.0 mg/dL    Alkaline Phosphatase 57 55 - 135 U/L    AST 23 10 - 40 U/L    ALT 19 10 - 44 U/L    Anion Gap 13 8 - 16 mmol/L    eGFR if African American >60.0 >60 mL/min/1.73 m^2    eGFR if non African American >60.0 >60 mL/min/1.73 m^2        Imaging:  Cardiac device check - Remote  Additional Comments  REMOTE Interrogation Report    Presenting egram demonstrates:  AS/BiV Paced  Device fxn WNL  RA pacing  0%, V pacing  100%  Atrial arrhythmias:  none  Ventricular arrhythmias:  none  Battery Longevity/Status:  3.4 years  Return to device clinic in  October 2021.    Report prepared by: Maria Ines ROBERT.            Diagnoses:       1. NSCLC of right lung    2. Clinical trial participant    3. History of breast cancer in female    4. Dilated cardiomyopathy    5. Left bundle-branch block    6. Cardiac resynchronization therapy defibrillator (CRT-D) in place    7. Essential hypertension    8. Simple chronic bronchitis    9. Type 2 diabetes mellitus without complication, without long-term current use of insulin          Assessment and Plan:         1,2. Stage IIIA NSCLC  Plan is for definitive chemoRT, will need re-staging scans prior.  Reviewed with patient in detail her diagnosis, stage, treatment options, and prognosis (3 year OS 66% vs. 43.5% without durvalumab) with standard of care  chemoRT followed by maintenance immunotherapy.  Patient has consented for [id number] clinical trial, a randomized control trial evaluating combination chemoRT + durvalumab followed by maintenance vs. SOC chemoRT -> maintenance.  - patient will be initiating [id number] on SOC arm.  Patient has a good performance status ECOG 1 and life expectancy > 12 weeks.     3. 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      4,5,6 Stable, follows with cardiology.  AICD placed, but patient now has recovered EF and only takes lasix prn.  NYHA class I.     7 Controlled continue to follow with PCP and cardiologist.     8 Controlled on inhalers     9 Last HbA1c 7, controlled            Greater than 50% of this time involved counseling or coordination of care. I have provided the patient with an opportunity to ask questions and have all questions answered to his satisfaction.     she will return to clinic per protocol, but knows to call in the interim if symptoms change or should a problem arise.    Javi Avina MD  Medical Oncology   [facility]  [facility]

## 2020-11-17 ENCOUNTER — PATIENT MESSAGE (OUTPATIENT)
Dept: HEMATOLOGY/ONCOLOGY | Facility: CLINIC | Age: 65
End: 2020-11-17

## 2020-11-17 ENCOUNTER — INFUSION (OUTPATIENT)
Dept: INFUSION THERAPY | Facility: HOSPITAL | Age: 65
End: 2020-11-17
Payer: MEDICARE

## 2020-11-17 ENCOUNTER — RESEARCH ENCOUNTER (OUTPATIENT)
Dept: RESEARCH | Facility: HOSPITAL | Age: 65
End: 2020-11-17

## 2020-11-17 VITALS
BODY MASS INDEX: 34.93 KG/M2 | HEART RATE: 88 BPM | TEMPERATURE: 98 F | DIASTOLIC BLOOD PRESSURE: 67 MMHG | WEIGHT: 189.81 LBS | OXYGEN SATURATION: 96 % | SYSTOLIC BLOOD PRESSURE: 122 MMHG | RESPIRATION RATE: 18 BRPM | HEIGHT: 62 IN

## 2020-11-17 DIAGNOSIS — R91.8 LUNG MASS: Primary | ICD-10-CM

## 2020-11-17 DIAGNOSIS — R91.8 LUNG MASS: ICD-10-CM

## 2020-11-17 PROCEDURE — 77338 DESIGN MLC DEVICE FOR IMRT: CPT | Mod: 26,HCNC,, | Performed by: RADIOLOGY

## 2020-11-17 PROCEDURE — 77014 PR  CT GUIDANCE PLACEMENT RAD THERAPY FIELDS: CPT | Mod: 26,HCNC,, | Performed by: RADIOLOGY

## 2020-11-17 PROCEDURE — 77386 HC IMRT, COMPLEX: CPT | Mod: HCNC | Performed by: RADIOLOGY

## 2020-11-17 PROCEDURE — 25000003 PHARM REV CODE 250: Mod: HCNC | Performed by: INTERNAL MEDICINE

## 2020-11-17 PROCEDURE — 96417 CHEMO IV INFUS EACH ADDL SEQ: CPT | Mod: HCNC

## 2020-11-17 PROCEDURE — 63600175 PHARM REV CODE 636 W HCPCS: Mod: HCNC | Performed by: NURSE PRACTITIONER

## 2020-11-17 PROCEDURE — 63600175 PHARM REV CODE 636 W HCPCS: Mod: HCNC | Performed by: INTERNAL MEDICINE

## 2020-11-17 PROCEDURE — 77300 RADIATION THERAPY DOSE PLAN: CPT | Mod: TC,HCNC | Performed by: RADIOLOGY

## 2020-11-17 PROCEDURE — 77014 PR  CT GUIDANCE PLACEMENT RAD THERAPY FIELDS: ICD-10-PCS | Mod: 26,HCNC,, | Performed by: RADIOLOGY

## 2020-11-17 PROCEDURE — 77338 DESIGN MLC DEVICE FOR IMRT: CPT | Mod: TC,HCNC | Performed by: RADIOLOGY

## 2020-11-17 PROCEDURE — 96375 TX/PRO/DX INJ NEW DRUG ADDON: CPT | Mod: HCNC

## 2020-11-17 PROCEDURE — 77338 PR  MLC IMRT DESIGN & CONSTRUCTION PER IMRT PLAN: ICD-10-PCS | Mod: 26,HCNC,, | Performed by: RADIOLOGY

## 2020-11-17 PROCEDURE — 96367 TX/PROPH/DG ADDL SEQ IV INF: CPT | Mod: HCNC,Q1

## 2020-11-17 PROCEDURE — 96413 CHEMO IV INFUSION 1 HR: CPT | Mod: HCNC

## 2020-11-17 PROCEDURE — 77300 PR RADIATION THERAPY,DOSIMETRY PLAN: ICD-10-PCS | Mod: 26,HCNC,, | Performed by: RADIOLOGY

## 2020-11-17 PROCEDURE — A4216 STERILE WATER/SALINE, 10 ML: HCPCS | Mod: HCNC | Performed by: INTERNAL MEDICINE

## 2020-11-17 PROCEDURE — 77014 HC CT GUIDANCE RADIATION THERAPY FLDS PLACEMENT: CPT | Mod: TC,HCNC | Performed by: RADIOLOGY

## 2020-11-17 PROCEDURE — 77300 RADIATION THERAPY DOSE PLAN: CPT | Mod: 26,HCNC,, | Performed by: RADIOLOGY

## 2020-11-17 RX ORDER — METHYLPREDNISOLONE SOD SUCC 125 MG
100 VIAL (EA) INJECTION
Status: DISCONTINUED | OUTPATIENT
Start: 2020-11-17 | End: 2020-11-17 | Stop reason: HOSPADM

## 2020-11-17 RX ORDER — FAMOTIDINE 10 MG/ML
20 INJECTION INTRAVENOUS
Status: COMPLETED | OUTPATIENT
Start: 2020-11-17 | End: 2020-11-17

## 2020-11-17 RX ORDER — DEXAMETHASONE 4 MG/1
8 TABLET ORAL DAILY
Qty: 40 TABLET | Refills: 0 | Status: SHIPPED | OUTPATIENT
Start: 2020-11-17 | End: 2020-12-07

## 2020-11-17 RX ORDER — EPINEPHRINE 0.3 MG/.3ML
0.3 INJECTION SUBCUTANEOUS ONCE AS NEEDED
Status: DISCONTINUED | OUTPATIENT
Start: 2020-11-17 | End: 2020-11-17 | Stop reason: HOSPADM

## 2020-11-17 RX ORDER — SODIUM CHLORIDE 0.9 % (FLUSH) 0.9 %
10 SYRINGE (ML) INJECTION
Status: DISCONTINUED | OUTPATIENT
Start: 2020-11-17 | End: 2020-11-17 | Stop reason: HOSPADM

## 2020-11-17 RX ORDER — DIPHENHYDRAMINE HYDROCHLORIDE 50 MG/ML
50 INJECTION INTRAMUSCULAR; INTRAVENOUS ONCE AS NEEDED
Status: COMPLETED | OUTPATIENT
Start: 2020-11-17 | End: 2020-11-17

## 2020-11-17 RX ORDER — HEPARIN 100 UNIT/ML
500 SYRINGE INTRAVENOUS
Status: DISCONTINUED | OUTPATIENT
Start: 2020-11-17 | End: 2020-11-17 | Stop reason: HOSPADM

## 2020-11-17 RX ADMIN — SODIUM CHLORIDE: 9 INJECTION, SOLUTION INTRAVENOUS at 08:11

## 2020-11-17 RX ADMIN — Medication 10 ML: at 12:11

## 2020-11-17 RX ADMIN — HYDROCORTISONE SODIUM SUCCINATE 100 MG: 100 INJECTION, POWDER, FOR SOLUTION INTRAMUSCULAR; INTRAVENOUS at 09:11

## 2020-11-17 RX ADMIN — PACLITAXEL 90 MG: 6 INJECTION, SOLUTION INTRAVENOUS at 09:11

## 2020-11-17 RX ADMIN — CARBOPLATIN 210 MG: 10 INJECTION INTRAVENOUS at 12:11

## 2020-11-17 RX ADMIN — DIPHENHYDRAMINE HYDROCHLORIDE 50 MG: 50 INJECTION INTRAMUSCULAR; INTRAVENOUS at 09:11

## 2020-11-17 RX ADMIN — FAMOTIDINE 20 MG: 10 INJECTION INTRAVENOUS at 08:11

## 2020-11-17 RX ADMIN — DIPHENHYDRAMINE HYDROCHLORIDE 50 MG: 50 INJECTION, SOLUTION INTRAMUSCULAR; INTRAVENOUS at 09:11

## 2020-11-17 RX ADMIN — DEXAMETHASONE SODIUM PHOSPHATE: 4 INJECTION, SOLUTION INTRA-ARTICULAR; INTRALESIONAL; INTRAMUSCULAR; INTRAVENOUS; SOFT TISSUE at 08:11

## 2020-11-17 RX ADMIN — HEPARIN 500 UNITS: 100 SYRINGE at 12:11

## 2020-11-17 RX ADMIN — APREPITANT 130 MG: 130 INJECTION, EMULSION INTRAVENOUS at 09:11

## 2020-11-17 NOTE — PLAN OF CARE
1310 pt able to complete taxol infusion (see notes) and able to complete carboplatin with no issues, pt taken to radiation by [facility] transport via wheelchair, pt to rtc 11/24/20, no distress noted upon d/c to radiation via wheelchair

## 2020-11-17 NOTE — PROGRESS NOTES
Protocol: Randomized Phase III Trial of [id number] (Durvalumab) as Concurrent and Consolidative Therapy or Consolidative Therapy Alone for Unresectable Stage 3 NSCLC   Protocol # GG1538          IRB # 2020.326  PI: Carmen Akhtar MD  Treating Physician: Javi Avina MD  Study ID #: [id number]  Assigned ARM: ARM B/Investigator Regimen Option# 3 Concurrent Chemo/RT with Carboplatin and Taxol    November 17, 2020    Cycle 1 infusion    Patient seen in chemotherapy infusion suite today for C1 of Taxol/Carboplatin. @ 10:15 Message received from chemo nurse Karen Arriola that patient had a reaction to Taxol about 4 minutes into start of infusion. See infusion note.    Based on chemo nurses description of events patient had a grade 2 infusion reaction. Patient received Hydrocortisone 100 mg IV in addition to her pre-med steroids and another 50 mg of Benadryl IV in addition to pre-med. She recovered quickly.This was discussed with Dr. Avina and orders were given to re challenge with Taxol.     I called and spoke with Mrs. Montoya's daughter Elizabeth regarding her reaction to Taxol and interventions taken. She voiced several concerns and is very anxious regarding what to do if she were to have a reaction at home. Notified Dr. Avina of daughter's concerns. See Epic message. Relayed his recommendations to daughter.     Patient was able to complete Taxol/Carboplatin infusions without any further issues. She was escorted to radiation oncology by transport for radiation therapy. Her daughter Elizabeth will pick her up from radiation and bring her home to stay with her.     Patient is to RTC Monday- Friday for radiation therapy and on 11/24/2020 for cycle 2 of chemotherapy.

## 2020-11-17 NOTE — NURSING
0949 pt with c/o feeling dizzy,weak, nauseated, flushing starting, taxol stopped , saline open, BP=98/49 HR=98 spo2=92 %, o2 2l nc placed  0957 hydrocortisone 100 mg given IV    0959 pt with c/o chest pain BP= 92/52 spo2= 93 % benadryl 50 mg given     1005 BP= 107/52 HR= 90 spo2=96% pt states feeling better, no flushing noted, chest pain relieved, pt very sleepy    1015 pablo research nurse and dr raza aware per secure chat awaiting orders    1020 pablo at bedside to see pt, awaiting orders

## 2020-11-17 NOTE — PLAN OF CARE
0820 pt here for PS3641 trial Taxol/Carbo infusion D1C1, labs, hx, meds, allergies reviewed, pt with no complaints at this time, oriented to unit and infusion process, pt reclined in chair, warm blanket provided, continue to monitor

## 2020-11-18 PROCEDURE — 77470 PR  SPECIAL RADIATION TREATMENT: ICD-10-PCS | Mod: 26,59,HCNC, | Performed by: RADIOLOGY

## 2020-11-18 PROCEDURE — 77014 PR  CT GUIDANCE PLACEMENT RAD THERAPY FIELDS: ICD-10-PCS | Mod: 26,HCNC,, | Performed by: RADIOLOGY

## 2020-11-18 PROCEDURE — 77470 SPECIAL RADIATION TREATMENT: CPT | Mod: 59,TC,HCNC | Performed by: RADIOLOGY

## 2020-11-18 PROCEDURE — 77470 SPECIAL RADIATION TREATMENT: CPT | Mod: 26,59,HCNC, | Performed by: RADIOLOGY

## 2020-11-18 PROCEDURE — 77014 PR  CT GUIDANCE PLACEMENT RAD THERAPY FIELDS: CPT | Mod: 26,HCNC,, | Performed by: RADIOLOGY

## 2020-11-18 PROCEDURE — 77386 HC IMRT, COMPLEX: CPT | Mod: HCNC | Performed by: RADIOLOGY

## 2020-11-18 PROCEDURE — 77014 HC CT GUIDANCE RADIATION THERAPY FLDS PLACEMENT: CPT | Mod: TC,HCNC | Performed by: RADIOLOGY

## 2020-11-19 PROCEDURE — G6002 STEREOSCOPIC X-RAY GUIDANCE: HCPCS | Mod: 26,HCNC,, | Performed by: RADIOLOGY

## 2020-11-19 PROCEDURE — G6002 PR STEREOSCOPIC XRAY GUIDE FOR RADIATION TX DELIV: ICD-10-PCS | Mod: 26,HCNC,, | Performed by: RADIOLOGY

## 2020-11-19 PROCEDURE — 77386 HC IMRT, COMPLEX: CPT | Mod: HCNC | Performed by: RADIOLOGY

## 2020-11-20 PROCEDURE — G6002 PR STEREOSCOPIC XRAY GUIDE FOR RADIATION TX DELIV: ICD-10-PCS | Mod: 26,HCNC,, | Performed by: RADIOLOGY

## 2020-11-20 PROCEDURE — 77386 HC IMRT, COMPLEX: CPT | Mod: HCNC | Performed by: RADIOLOGY

## 2020-11-20 PROCEDURE — 77417 THER RADIOLOGY PORT IMAGE(S): CPT | Mod: HCNC | Performed by: RADIOLOGY

## 2020-11-20 PROCEDURE — G6002 STEREOSCOPIC X-RAY GUIDANCE: HCPCS | Mod: 26,HCNC,, | Performed by: RADIOLOGY

## 2020-11-22 PROCEDURE — G6002 STEREOSCOPIC X-RAY GUIDANCE: HCPCS | Mod: 26,HCNC,, | Performed by: RADIOLOGY

## 2020-11-22 PROCEDURE — 77386 HC IMRT, COMPLEX: CPT | Mod: HCNC | Performed by: RADIOLOGY

## 2020-11-22 PROCEDURE — G6002 PR STEREOSCOPIC XRAY GUIDE FOR RADIATION TX DELIV: ICD-10-PCS | Mod: 26,HCNC,, | Performed by: RADIOLOGY

## 2020-11-23 ENCOUNTER — RESEARCH ENCOUNTER (OUTPATIENT)
Dept: RESEARCH | Facility: HOSPITAL | Age: 65
End: 2020-11-23

## 2020-11-23 ENCOUNTER — OFFICE VISIT (OUTPATIENT)
Dept: HEMATOLOGY/ONCOLOGY | Facility: CLINIC | Age: 65
End: 2020-11-23
Payer: MEDICARE

## 2020-11-23 ENCOUNTER — LAB VISIT (OUTPATIENT)
Dept: LAB | Facility: HOSPITAL | Age: 65
End: 2020-11-23
Attending: INTERNAL MEDICINE
Payer: MEDICARE

## 2020-11-23 VITALS
HEART RATE: 79 BPM | RESPIRATION RATE: 18 BRPM | OXYGEN SATURATION: 95 % | HEIGHT: 62 IN | SYSTOLIC BLOOD PRESSURE: 145 MMHG | WEIGHT: 191.81 LBS | BODY MASS INDEX: 35.3 KG/M2 | TEMPERATURE: 97 F | DIASTOLIC BLOOD PRESSURE: 73 MMHG

## 2020-11-23 DIAGNOSIS — E11.9 TYPE 2 DIABETES MELLITUS WITHOUT COMPLICATION, WITHOUT LONG-TERM CURRENT USE OF INSULIN: ICD-10-CM

## 2020-11-23 DIAGNOSIS — C34.91 NSCLC OF RIGHT LUNG: ICD-10-CM

## 2020-11-23 DIAGNOSIS — C34.91 NSCLC OF RIGHT LUNG: Primary | ICD-10-CM

## 2020-11-23 DIAGNOSIS — I10 ESSENTIAL HYPERTENSION: ICD-10-CM

## 2020-11-23 DIAGNOSIS — I44.7 LEFT BUNDLE-BRANCH BLOCK: ICD-10-CM

## 2020-11-23 DIAGNOSIS — Z85.3 HISTORY OF BREAST CANCER IN FEMALE: ICD-10-CM

## 2020-11-23 DIAGNOSIS — Z00.6 EXAMINATION OF PARTICIPANT IN CLINICAL TRIAL: ICD-10-CM

## 2020-11-23 DIAGNOSIS — Z00.6 CLINICAL TRIAL PARTICIPANT: ICD-10-CM

## 2020-11-23 DIAGNOSIS — Z95.810 CARDIAC RESYNCHRONIZATION THERAPY DEFIBRILLATOR (CRT-D) IN PLACE: ICD-10-CM

## 2020-11-23 DIAGNOSIS — I42.0 DILATED CARDIOMYOPATHY: ICD-10-CM

## 2020-11-23 DIAGNOSIS — J41.0 SIMPLE CHRONIC BRONCHITIS: ICD-10-CM

## 2020-11-23 LAB
ALBUMIN SERPL BCP-MCNC: 3.2 G/DL (ref 3.5–5.2)
ALP SERPL-CCNC: 51 U/L (ref 55–135)
ALT SERPL W/O P-5'-P-CCNC: 21 U/L (ref 10–44)
ANION GAP SERPL CALC-SCNC: 8 MMOL/L (ref 8–16)
AST SERPL-CCNC: 17 U/L (ref 10–40)
BASOPHILS # BLD AUTO: 0.01 K/UL (ref 0–0.2)
BASOPHILS NFR BLD: 0.3 % (ref 0–1.9)
BILIRUB SERPL-MCNC: 0.3 MG/DL (ref 0.1–1)
BUN SERPL-MCNC: 18 MG/DL (ref 8–23)
CALCIUM SERPL-MCNC: 9.2 MG/DL (ref 8.7–10.5)
CHLORIDE SERPL-SCNC: 105 MMOL/L (ref 95–110)
CO2 SERPL-SCNC: 26 MMOL/L (ref 23–29)
CREAT SERPL-MCNC: 0.8 MG/DL (ref 0.5–1.4)
DIFFERENTIAL METHOD: ABNORMAL
EOSINOPHIL # BLD AUTO: 0.1 K/UL (ref 0–0.5)
EOSINOPHIL NFR BLD: 2 % (ref 0–8)
ERYTHROCYTE [DISTWIDTH] IN BLOOD BY AUTOMATED COUNT: 14.7 % (ref 11.5–14.5)
EST. GFR  (AFRICAN AMERICAN): >60 ML/MIN/1.73 M^2
EST. GFR  (NON AFRICAN AMERICAN): >60 ML/MIN/1.73 M^2
GLUCOSE SERPL-MCNC: 194 MG/DL (ref 70–110)
HCT VFR BLD AUTO: 35.4 % (ref 37–48.5)
HGB BLD-MCNC: 10.6 G/DL (ref 12–16)
IMM GRANULOCYTES # BLD AUTO: 0.04 K/UL (ref 0–0.04)
IMM GRANULOCYTES NFR BLD AUTO: 1.1 % (ref 0–0.5)
LYMPHOCYTES # BLD AUTO: 0.6 K/UL (ref 1–4.8)
LYMPHOCYTES NFR BLD: 16.5 % (ref 18–48)
MCH RBC QN AUTO: 27.3 PG (ref 27–31)
MCHC RBC AUTO-ENTMCNC: 29.9 G/DL (ref 32–36)
MCV RBC AUTO: 91 FL (ref 82–98)
MONOCYTES # BLD AUTO: 0.3 K/UL (ref 0.3–1)
MONOCYTES NFR BLD: 7.4 % (ref 4–15)
NEUTROPHILS # BLD AUTO: 2.6 K/UL (ref 1.8–7.7)
NEUTROPHILS NFR BLD: 72.7 % (ref 38–73)
NRBC BLD-RTO: 0 /100 WBC
PLATELET # BLD AUTO: 158 K/UL (ref 150–350)
PMV BLD AUTO: 11.9 FL (ref 9.2–12.9)
POTASSIUM SERPL-SCNC: 4.4 MMOL/L (ref 3.5–5.1)
PROT SERPL-MCNC: 6.6 G/DL (ref 6–8.4)
RBC # BLD AUTO: 3.88 M/UL (ref 4–5.4)
SODIUM SERPL-SCNC: 139 MMOL/L (ref 136–145)
WBC # BLD AUTO: 3.51 K/UL (ref 3.9–12.7)

## 2020-11-23 PROCEDURE — 3077F PR MOST RECENT SYSTOLIC BLOOD PRESSURE >= 140 MM HG: ICD-10-PCS | Mod: HCNC,CPTII,S$GLB, | Performed by: INTERNAL MEDICINE

## 2020-11-23 PROCEDURE — 3008F BODY MASS INDEX DOCD: CPT | Mod: HCNC,CPTII,S$GLB, | Performed by: INTERNAL MEDICINE

## 2020-11-23 PROCEDURE — 99999 PR PBB SHADOW E&M-EST. PATIENT-LVL V: CPT | Mod: PBBFAC,HCNC,, | Performed by: INTERNAL MEDICINE

## 2020-11-23 PROCEDURE — 3077F SYST BP >= 140 MM HG: CPT | Mod: HCNC,CPTII,S$GLB, | Performed by: INTERNAL MEDICINE

## 2020-11-23 PROCEDURE — 3008F PR BODY MASS INDEX (BMI) DOCUMENTED: ICD-10-PCS | Mod: HCNC,CPTII,S$GLB, | Performed by: INTERNAL MEDICINE

## 2020-11-23 PROCEDURE — 3051F HG A1C>EQUAL 7.0%<8.0%: CPT | Mod: HCNC,CPTII,S$GLB, | Performed by: INTERNAL MEDICINE

## 2020-11-23 PROCEDURE — 36415 COLL VENOUS BLD VENIPUNCTURE: CPT | Mod: HCNC

## 2020-11-23 PROCEDURE — 77014 PR  CT GUIDANCE PLACEMENT RAD THERAPY FIELDS: ICD-10-PCS | Mod: 26,HCNC,, | Performed by: RADIOLOGY

## 2020-11-23 PROCEDURE — 3051F PR MOST RECENT HEMOGLOBIN A1C LEVEL 7.0 - < 8.0%: ICD-10-PCS | Mod: HCNC,CPTII,S$GLB, | Performed by: INTERNAL MEDICINE

## 2020-11-23 PROCEDURE — 77014 PR  CT GUIDANCE PLACEMENT RAD THERAPY FIELDS: CPT | Mod: 26,HCNC,, | Performed by: RADIOLOGY

## 2020-11-23 PROCEDURE — 99215 OFFICE O/P EST HI 40 MIN: CPT | Mod: HCNC,S$GLB,, | Performed by: INTERNAL MEDICINE

## 2020-11-23 PROCEDURE — 1125F AMNT PAIN NOTED PAIN PRSNT: CPT | Mod: HCNC,S$GLB,, | Performed by: INTERNAL MEDICINE

## 2020-11-23 PROCEDURE — 77386 HC IMRT, COMPLEX: CPT | Mod: HCNC | Performed by: RADIOLOGY

## 2020-11-23 PROCEDURE — 99215 PR OFFICE/OUTPT VISIT, EST, LEVL V, 40-54 MIN: ICD-10-PCS | Mod: HCNC,S$GLB,, | Performed by: INTERNAL MEDICINE

## 2020-11-23 PROCEDURE — 77014 HC CT GUIDANCE RADIATION THERAPY FLDS PLACEMENT: CPT | Mod: TC,HCNC | Performed by: RADIOLOGY

## 2020-11-23 PROCEDURE — 99999 PR PBB SHADOW E&M-EST. PATIENT-LVL V: ICD-10-PCS | Mod: PBBFAC,HCNC,, | Performed by: INTERNAL MEDICINE

## 2020-11-23 PROCEDURE — 3078F PR MOST RECENT DIASTOLIC BLOOD PRESSURE < 80 MM HG: ICD-10-PCS | Mod: HCNC,CPTII,S$GLB, | Performed by: INTERNAL MEDICINE

## 2020-11-23 PROCEDURE — 80053 COMPREHEN METABOLIC PANEL: CPT | Mod: HCNC

## 2020-11-23 PROCEDURE — 1125F PR PAIN SEVERITY QUANTIFIED, PAIN PRESENT: ICD-10-PCS | Mod: HCNC,S$GLB,, | Performed by: INTERNAL MEDICINE

## 2020-11-23 PROCEDURE — 85025 COMPLETE CBC W/AUTO DIFF WBC: CPT | Mod: HCNC

## 2020-11-23 PROCEDURE — 3078F DIAST BP <80 MM HG: CPT | Mod: HCNC,CPTII,S$GLB, | Performed by: INTERNAL MEDICINE

## 2020-11-23 RX ORDER — FAMOTIDINE 10 MG/ML
20 INJECTION INTRAVENOUS
Status: CANCELLED | OUTPATIENT
Start: 2020-11-24

## 2020-11-23 RX ORDER — SODIUM CHLORIDE 0.9 % (FLUSH) 0.9 %
10 SYRINGE (ML) INJECTION
Status: CANCELLED | OUTPATIENT
Start: 2020-11-24

## 2020-11-23 RX ORDER — EPINEPHRINE 0.3 MG/.3ML
0.3 INJECTION SUBCUTANEOUS ONCE AS NEEDED
Status: CANCELLED | OUTPATIENT
Start: 2020-11-24

## 2020-11-23 RX ORDER — HEPARIN 100 UNIT/ML
500 SYRINGE INTRAVENOUS
Status: CANCELLED | OUTPATIENT
Start: 2020-11-24

## 2020-11-23 RX ORDER — DIPHENHYDRAMINE HYDROCHLORIDE 50 MG/ML
50 INJECTION INTRAMUSCULAR; INTRAVENOUS ONCE AS NEEDED
Status: CANCELLED | OUTPATIENT
Start: 2020-11-24

## 2020-11-23 NOTE — PROGRESS NOTES
Protocol: Randomized Phase III Trial of [id number] (Durvalumab) as Concurrent and Consolidative Therapy or Consolidative Therapy Alone for Unresectable Stage 3 NSCLC   Protocol # [id number]          IRB # 2020.326  PI: Carmen Akhtar MD  Treating Physician: Javi Avina MD  Study ID #: [id number]  Assigned ARM: ARM B/Investigator Regimen Option# 3 Concurrent Chemo/RT with Carboplatin and Taxol    November 23, 2020    Cycle 2 MD visit    Patient seen in clinic today for her C2 MD visit. Labs and H&P completed. Con meds and AEs reviewed. She denies any changes since last visit.     Current AEs    1. Dyspnea- Grade 1- Patient has a history of COPD requiring the use of multiple inhalers. She has dyspnea on moderate exertion and requires rescue inhaler PRN.  2. Anxiety- Grade 2- Patient takes Wellbutrin and Zoloft.   3. Depression- Grade 2- Patient takes Wellbutrin and Zoloft.  4. Hypertension- Grade 2 (intermittent)-  Patient states that her SBP is usually around 140 and her DBP is usually around 80. Her BP in clinic today is 145/73. She takes Coreg, Lisinopril, and Lasix.   5. Hyperglycemia- Grade 2- Patient has history of DM Type II. She takes Metformin for this. (Patient did eat prior to labs today)  6. Pain- Grade 1- Patient has intermittent mild pain to surgical site and occasional headaches.  7. Diarrhea-(not graded/baseline issue) Patient has loose watery stools after taking her Metformin. She states that she usually has about 4 loose stools in the AM and 2 loose stools in the PM. Her baseline # of stools is about 6 a day. Dr. Avina made aware. This should not be an issue since it is relted to her Metformin. She controls this with Imodium PRN. Will monitor. Patient educated on s/s to notify physician of.   8. Anorexia- Grade 2- This has improved since last week per patient.   9. Anemia- Grade 1 (baseline issue/intermittent) Hgb is 10.6 g/dl on today's labs.   10. Fatigue- Grade 2 (baseline issue/intermittent)- She notes  "that she has been feeling very tired lately. She is still able to perform her ADLs, but "lacks motivation". About the same per patient.  11. Infusion Reaction- Grade 2- Resolved. Patient has her prescription for dexamethasone. She was educated on how to take doses to prevent reaction with next infusions. She repeated back how to take medication.     Labs and AEs are within parameters for treatment. Dosing timeout performed with Dr. Avina. Taxol dosage calculated with screening weight 87.2 kg  (BSA 1.95 m2) per protocol. Carboplatin calculated with screening weight and C2D1 labs per study chair Dr. Trujillo's protocol clarification email. Orders signed by MD.      Of note, patient consented yes to optional specimens. Prior to treatment tissue specimen shipped today per protocol. [employer] Tracking # [id number].                     "

## 2020-11-23 NOTE — PROGRESS NOTES
ONCOLOGY FOLLOW UP VISIT.     Reason for visit: Toxicity check on SOC chemoradiation on [id number] clinical trial for stage IIIA NSCLC    Best Contact Phone Number(s): [phone] (home)      Cancer/Stage/TNM:   Cancer Staging  NSCLC of right lung  Staging form: Lung, AJCC 8th Edition  - Clinical stage from 9/29/2020: Stage IIIA (cT3, cN1, cM0) - Signed by Ramo Tucker MD on 10/24/2020       Oncology History   NSCLC of right lung   9/29/2020 Cancer Staged    Staging form: Lung, AJCC 8th Edition  - Clinical stage from 9/29/2020: Stage IIIA (cT3, cN1, cM0)     10/14/2020 Initial Diagnosis    NSCLC of right lung          Interval History:   Today patient reports no new symptoms.  She has some SINGH since surgery, but improves with rescue inhaler.  She does have some fatigue over last 2 days, but improves with rest - grade 1 baseline fatigue.    ROS:  Review of Systems   Constitutional: Negative for activity change, appetite change, chills, fatigue, fever and unexpected weight change.   HENT: Negative for mouth sores, nosebleeds and sore throat.    Eyes: Negative for visual disturbance.   Respiratory: Negative for cough, shortness of breath and wheezing.    Cardiovascular: Negative for chest pain, palpitations and leg swelling.   Gastrointestinal: Negative for abdominal distention, abdominal pain, blood in stool and diarrhea.   Endocrine: Negative for cold intolerance and heat intolerance.   Genitourinary: Positive for frequency. Negative for dysuria and flank pain.   Musculoskeletal: Negative for arthralgias, back pain, joint swelling and myalgias.   Skin: Negative for color change, rash and wound.   Neurological: Negative for dizziness, light-headedness and headaches.   Hematological: Negative for adenopathy. Does not bruise/bleed easily.   Psychiatric/Behavioral: The patient is not nervous/anxious.       A complete 12-point review of systems was reviewed and is negative except as mentioned above.     Past Medical  History:   Past Medical History:   Diagnosis Date    AICD (automatic cardioverter/defibrillator) present     Asthma     bronchitis in past    Breast cancer 2016    right    Cardiac pacemaker     Cardiomyopathy     COPD (chronic obstructive pulmonary disease)     Diabetes mellitus, type 2     Hyperglycemia     Hyperlipidemia     Hypertension     Malignant neoplasm of overlapping sites of female breast 2/12/2016    Nuclear sclerosis of both eyes 8/12/2020    Respiratory distress 3/12/2020        Allergies:   Review of patient's allergies indicates:   Allergen Reactions    Taxol [paclitaxel]      Hypersensitivity reaction to taxol, symptoms included shortness of breath, nausea, dizziness, flushing     Adhesive Rash     tegaderm burns and blistered skin        Medications:   Current Outpatient Medications   Medication Sig Dispense Refill    albuterol sulfate (PROAIR RESPICLICK) 90 mcg/actuation AePB Inhale 2 puffs into the lungs every 4 (four) hours as needed. Rescue 1 each 5    albuterol-ipratropium (DUO-NEB) 2.5 mg-0.5 mg/3 mL nebulizer solution Take 3 mLs by nebulization every 6 (six) hours while awake. Rescue 30 mL 5    atorvastatin (LIPITOR) 10 MG tablet Take 1 tablet (10 mg total) by mouth once daily. (Patient taking differently: Take 10 mg by mouth every evening. ) 90 tablet 3    buPROPion (WELLBUTRIN XL) 150 MG TB24 tablet TAKE 1 TABLET BY MOUTH EVERY DAY (Patient taking differently: Take 150 mg by mouth every morning. ) 90 tablet 1    carvediloL (COREG) 25 MG tablet TAKE 1 TABLET(25 MG) BY MOUTH TWICE DAILY 180 tablet 3    cetirizine (ZYRTEC) 10 MG tablet Take 10 mg by mouth every evening.       dexAMETHasone (DECADRON) 4 MG Tab Take 2 tablets (8 mg total) by mouth once daily. Take the 1 day before, 6 hours prior, and on days 2 and 3 of your chemotherapy cycle. for 20 days 40 tablet 0    fluticasone-salmeterol diskus inhaler 250-50 mcg INHALE 1 PUFF INTO THE LUNGS 2 (TWO) TIMES DAILY.  (Patient taking differently: INHALE 1 PUFF INTO THE LUNGS 2 (TWO) TIMES DAILY.) 60 each 5    furosemide (LASIX) 40 MG tablet Take 1 tablet (40 mg total) by mouth once daily. TAKE 1 TABLET(40 MG) BY MOUTH TWICE DAILY 90 tablet 1    lidocaine-prilocaine (EMLA) cream Apply topically as needed. 30 g 3    metFORMIN (GLUCOPHAGE) 500 MG tablet Take 1 tablet (500 mg total) by mouth 2 (two) times daily. 180 tablet 1    multivitamin (ONE DAILY MULTIVITAMIN) per tablet Take 1 tablet by mouth once daily.      ondansetron (ZOFRAN-ODT) 8 MG TbDL Take 1 tablet (8 mg total) by mouth every 12 (twelve) hours as needed. 30 tablet 1    oxyCODONE-acetaminophen (PERCOCET) 5-325 mg per tablet Take 1 tablet by mouth every 4 (four) hours as needed for Pain. 10 tablet 0    prochlorperazine (COMPAZINE) 5 MG tablet Take 1 tablet (5 mg total) by mouth every 6 (six) hours as needed for Nausea. 30 tablet 1    sertraline (ZOLOFT) 100 MG tablet Take 1 tablet (100 mg total) by mouth every evening. 90 tablet 1    SPIRIVA WITH HANDIHALER 18 mcg inhalation capsule INHALE THE CONTENTS OF 1 CAPSULE EVERY DAY 90 capsule 0    TRUETEST TEST STRIPS Strp test once EVERY MORNING 100 strip 1    oxyCODONE (ROXICODONE) 5 MG immediate release tablet Take 1 tablet (5 mg total) by mouth every 4 (four) hours as needed for Pain. (Patient not taking: Reported on 11/9/2020) 30 tablet 0     No current facility-administered medications for this visit.      Facility-Administered Medications Ordered in Other Visits   Medication Dose Route Frequency Provider Last Rate Last Dose    fentaNYL injection 25 mcg  25 mcg Intravenous Q5 Min PRN Keesha Martins MD        haloperidol lactate injection 0.5 mg  0.5 mg Intravenous Once PRN Keesha Martins MD        HYDROmorphone injection 0.2 mg  0.2 mg Intravenous Q5 Min PRN Keesha Martins MD        ondansetron injection 4 mg  4 mg Intravenous Once PRN Keesha Martins MD        sodium chloride 0.9% flush 10 mL  10 mL Intravenous PRN Keesha  "MD Eliezer            Physical Exam:   BP (!) 145/73 (BP Location: Left arm, Patient Position: Sitting, BP Method: Medium (Automatic))   Pulse 79   Temp 97.3 °F (36.3 °C) (Temporal)   Resp 18   Ht 5' 2" (1.575 m)   Wt 87 kg (191 lb 12.8 oz)   LMP  (LMP Unknown)   SpO2 95%   BMI 35.08 kg/m²      ECOG Performance Status: (foot note - ECOG PS provided by Eastern Cooperative Oncology Group) 0 - Asymptomatic    Physical Exam  Constitutional:       Appearance: Normal appearance. She is well-developed.   HENT:      Head: Normocephalic and atraumatic.   Eyes:      Conjunctiva/sclera: Conjunctivae normal.      Pupils: Pupils are equal, round, and reactive to light.   Neck:      Musculoskeletal: Normal range of motion and neck supple.   Pulmonary:      Effort: Pulmonary effort is normal. No respiratory distress.   Abdominal:      General: There is no distension.      Palpations: Abdomen is soft.   Musculoskeletal: Normal range of motion.         General: No swelling.   Lymphadenopathy:      Cervical: No cervical adenopathy.   Skin:     General: Skin is warm and dry.   Neurological:      General: No focal deficit present.      Mental Status: She is alert and oriented to person, place, and time.   Psychiatric:         Mood and Affect: Mood normal.         Behavior: Behavior normal.           Labs:   Recent Results (from the past 48 hour(s))   CBC W/ AUTO DIFFERENTIAL    Collection Time: 11/23/20 10:10 AM   Result Value Ref Range    WBC 3.51 (L) 3.90 - 12.70 K/uL    RBC 3.88 (L) 4.00 - 5.40 M/uL    Hemoglobin 10.6 (L) 12.0 - 16.0 g/dL    Hematocrit 35.4 (L) 37.0 - 48.5 %    MCV 91 82 - 98 fL    MCH 27.3 27.0 - 31.0 pg    MCHC 29.9 (L) 32.0 - 36.0 g/dL    RDW 14.7 (H) 11.5 - 14.5 %    Platelets 158 150 - 350 K/uL    MPV 11.9 9.2 - 12.9 fL    Immature Granulocytes 1.1 (H) 0.0 - 0.5 %    Gran # (ANC) 2.6 1.8 - 7.7 K/uL    Immature Grans (Abs) 0.04 0.00 - 0.04 K/uL    Lymph # 0.6 (L) 1.0 - 4.8 K/uL    Mono # 0.3 0.3 - 1.0 K/uL "    Eos # 0.1 0.0 - 0.5 K/uL    Baso # 0.01 0.00 - 0.20 K/uL    nRBC 0 0 /100 WBC    Gran % 72.7 38.0 - 73.0 %    Lymph % 16.5 (L) 18.0 - 48.0 %    Mono % 7.4 4.0 - 15.0 %    Eosinophil % 2.0 0.0 - 8.0 %    Basophil % 0.3 0.0 - 1.9 %    Differential Method Automated    COMPREHENSIVE METABOLIC PANEL    Collection Time: 11/23/20 10:10 AM   Result Value Ref Range    Sodium 139 136 - 145 mmol/L    Potassium 4.4 3.5 - 5.1 mmol/L    Chloride 105 95 - 110 mmol/L    CO2 26 23 - 29 mmol/L    Glucose 194 (H) 70 - 110 mg/dL    BUN 18 8 - 23 mg/dL    Creatinine 0.8 0.5 - 1.4 mg/dL    Calcium 9.2 8.7 - 10.5 mg/dL    Total Protein 6.6 6.0 - 8.4 g/dL    Albumin 3.2 (L) 3.5 - 5.2 g/dL    Total Bilirubin 0.3 0.1 - 1.0 mg/dL    Alkaline Phosphatase 51 (L) 55 - 135 U/L    AST 17 10 - 40 U/L    ALT 21 10 - 44 U/L    Anion Gap 8 8 - 16 mmol/L    eGFR if African American >60.0 >60 mL/min/1.73 m^2    eGFR if non African American >60.0 >60 mL/min/1.73 m^2        Imaging:  Cardiac device check - Remote  Additional Comments  REMOTE Interrogation Report    Presenting egram demonstrates:  AS/BiV Paced  Device fxn WNL  RA pacing  0%, V pacing  100%  Atrial arrhythmias:  none  Ventricular arrhythmias:  none  Battery Longevity/Status:  3.4 years  Return to device clinic in  October 2021.    Report prepared by: June B.            Diagnoses:       1. NSCLC of right lung    2. Clinical trial participant    3. History of breast cancer in female    4. Dilated cardiomyopathy    5. Left bundle-branch block    6. Cardiac resynchronization therapy defibrillator (CRT-D) in place    7. Essential hypertension    8. Simple chronic bronchitis    9. Type 2 diabetes mellitus without complication, without long-term current use of insulin          Assessment and Plan:         1,2. Stage IIIA NSCLC  Plan is for definitive chemoRT, will need re-staging scans prior.  Reviewed with patient in detail her diagnosis, stage, treatment options, and prognosis (3 year OS 66%  vs. 43.5% without durvalumab) with standard of care chemoRT followed by maintenance immunotherapy.  Patient has consented for KS1007 clinical trial, a randomized control trial evaluating combination chemoRT + durvalumab followed by maintenance vs. SOC chemoRT -> maintenance.  - patient randomized on [id number] to SOC arm.  Patient has a good performance status ECOG 1 and life expectancy > 12 weeks.  Tolerating treatment well other than paclitaxel reaction.  Now with steroids prior to treatment and 2 days after.     3. 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      4,5,6 Stable, follows with cardiology.  AICD placed, but patient now has recovered EF and only takes lasix prn.  NYHA class I.     7 Controlled continue to follow with PCP and cardiologist.     8 Controlled on inhalers     9 Last HbA1c 7, controlled            Greater than 50% of this time involved counseling or coordination of care. I have provided the patient with an opportunity to ask questions and have all questions answered to his satisfaction.     she will return to clinic per protocol, but knows to call in the interim if symptoms change or should a problem arise.    Javi Avina MD  Medical Oncology   [facility]  [facility]

## 2020-11-24 ENCOUNTER — INFUSION (OUTPATIENT)
Dept: INFUSION THERAPY | Facility: HOSPITAL | Age: 65
End: 2020-11-24
Payer: MEDICARE

## 2020-11-24 ENCOUNTER — RESEARCH ENCOUNTER (OUTPATIENT)
Dept: RESEARCH | Facility: HOSPITAL | Age: 65
End: 2020-11-24

## 2020-11-24 ENCOUNTER — PATIENT MESSAGE (OUTPATIENT)
Dept: HEMATOLOGY/ONCOLOGY | Facility: CLINIC | Age: 65
End: 2020-11-24

## 2020-11-24 VITALS
DIASTOLIC BLOOD PRESSURE: 59 MMHG | RESPIRATION RATE: 16 BRPM | TEMPERATURE: 99 F | SYSTOLIC BLOOD PRESSURE: 122 MMHG | OXYGEN SATURATION: 95 % | HEART RATE: 77 BPM

## 2020-11-24 DIAGNOSIS — R91.8 LUNG MASS: Primary | ICD-10-CM

## 2020-11-24 PROCEDURE — 96413 CHEMO IV INFUSION 1 HR: CPT | Mod: HCNC,Q1

## 2020-11-24 PROCEDURE — 25000003 PHARM REV CODE 250: Mod: HCNC | Performed by: INTERNAL MEDICINE

## 2020-11-24 PROCEDURE — A4216 STERILE WATER/SALINE, 10 ML: HCPCS | Mod: HCNC | Performed by: INTERNAL MEDICINE

## 2020-11-24 PROCEDURE — G6002 PR STEREOSCOPIC XRAY GUIDE FOR RADIATION TX DELIV: ICD-10-PCS | Mod: 26,HCNC,, | Performed by: RADIOLOGY

## 2020-11-24 PROCEDURE — 77386 HC IMRT, COMPLEX: CPT | Mod: HCNC | Performed by: RADIOLOGY

## 2020-11-24 PROCEDURE — 96367 TX/PROPH/DG ADDL SEQ IV INF: CPT | Mod: HCNC,Q1

## 2020-11-24 PROCEDURE — 96375 TX/PRO/DX INJ NEW DRUG ADDON: CPT | Mod: HCNC

## 2020-11-24 PROCEDURE — 63600175 PHARM REV CODE 636 W HCPCS: Mod: HCNC,Q1 | Performed by: INTERNAL MEDICINE

## 2020-11-24 PROCEDURE — 96417 CHEMO IV INFUS EACH ADDL SEQ: CPT | Mod: HCNC

## 2020-11-24 PROCEDURE — G6002 STEREOSCOPIC X-RAY GUIDANCE: HCPCS | Mod: 26,HCNC,, | Performed by: RADIOLOGY

## 2020-11-24 RX ORDER — EPINEPHRINE 0.3 MG/.3ML
0.3 INJECTION SUBCUTANEOUS ONCE AS NEEDED
Status: DISCONTINUED | OUTPATIENT
Start: 2020-11-24 | End: 2020-11-24 | Stop reason: HOSPADM

## 2020-11-24 RX ORDER — SODIUM CHLORIDE 0.9 % (FLUSH) 0.9 %
10 SYRINGE (ML) INJECTION
Status: DISCONTINUED | OUTPATIENT
Start: 2020-11-24 | End: 2020-11-24 | Stop reason: HOSPADM

## 2020-11-24 RX ORDER — DIPHENHYDRAMINE HYDROCHLORIDE 50 MG/ML
50 INJECTION INTRAMUSCULAR; INTRAVENOUS ONCE AS NEEDED
Status: DISCONTINUED | OUTPATIENT
Start: 2020-11-24 | End: 2020-11-24 | Stop reason: HOSPADM

## 2020-11-24 RX ORDER — HEPARIN 100 UNIT/ML
500 SYRINGE INTRAVENOUS
Status: DISCONTINUED | OUTPATIENT
Start: 2020-11-24 | End: 2020-11-24 | Stop reason: HOSPADM

## 2020-11-24 RX ORDER — FAMOTIDINE 10 MG/ML
20 INJECTION INTRAVENOUS
Status: COMPLETED | OUTPATIENT
Start: 2020-11-24 | End: 2020-11-24

## 2020-11-24 RX ADMIN — SODIUM CHLORIDE: 0.9 INJECTION, SOLUTION INTRAVENOUS at 10:11

## 2020-11-24 RX ADMIN — DEXAMETHASONE SODIUM PHOSPHATE: 4 INJECTION, SOLUTION INTRA-ARTICULAR; INTRALESIONAL; INTRAMUSCULAR; INTRAVENOUS; SOFT TISSUE at 10:11

## 2020-11-24 RX ADMIN — PACLITAXEL 90 MG: 6 INJECTION, SOLUTION, CONCENTRATE INTRAVENOUS at 11:11

## 2020-11-24 RX ADMIN — HEPARIN 500 UNITS: 100 SYRINGE at 01:11

## 2020-11-24 RX ADMIN — Medication 10 ML: at 01:11

## 2020-11-24 RX ADMIN — DIPHENHYDRAMINE HYDROCHLORIDE 50 MG: 50 INJECTION, SOLUTION INTRAMUSCULAR; INTRAVENOUS at 10:11

## 2020-11-24 RX ADMIN — APREPITANT 130 MG: 130 INJECTION, EMULSION INTRAVENOUS at 11:11

## 2020-11-24 RX ADMIN — CARBOPLATIN 250 MG: 10 INJECTION INTRAVENOUS at 01:11

## 2020-11-24 RX ADMIN — FAMOTIDINE 20 MG: 10 INJECTION INTRAVENOUS at 10:11

## 2020-11-24 NOTE — PLAN OF CARE
1350 Pt completed and tolerated C2D1 [id number] trial w/o issues. No reactions noted this time. Port de-accessed, line flushed, +blood return and hep locked prior to removal. Pt escorted to XRT via WC, and escort, Mr. Garcia. Pt aware of when to contact providers and potential side effects. Hydration encouraged. AVS declined, pt uses MyChart.   Pt to return in 1 week for C3D1. Reminded patient to take oral dex tomorrow and Thursday. Pt to premedicate for next cycle day before and 6 hours prior. Pt verbalized understanding.

## 2020-11-24 NOTE — PLAN OF CARE
1000 Pt arrived for C2D1 Traial [id number]. Pt ambulatory to chair independently. NAD noted on exam. Pt denies any abnormal side effects since start. Did speak w/ Celine, Research Nurse prior to meeting patient. Confirmed patient took oral dex starting yesterday and 6 hours prior, pt did take medications as prescribed. Pt did not get much rest, but contributing to steroids. Reviewed meds, hx, axs, labs, tx plan in detail Port accessed maintaining sterile technique: flushing easily, +blood return. Blankets/snacks offered. WCTM pt closely

## 2020-11-24 NOTE — PROGRESS NOTES
Protocol: Randomized Phase III Trial of [id number] (Durvalumab) as Concurrent and Consolidative Therapy or Consolidative Therapy Alone for Unresectable Stage 3 NSCLC   Protocol # JR4658          IRB # 2020.326  PI: Carmen Akhtar MD  Treating Physician: Javi Avina MD  Study ID #: [id number]  Assigned ARM: ARM B/Investigator Regimen Option# 3 Concurrent Chemo/RT with Carboplatin and Taxol    November 24, 2020    Cycle 2 infusion    Patient seen in chemotherapy infusion suite today for C2 of Taxol/Carboplatin. Chemo nurse Allyssa Irvin RN educated on infusion guidelines per protocol. She was made aware of patient's prior reaction to Taxol. Patient confirmed that she took home dexamethasone as prescribed.     Patient was able to complete Taxol/Carboplatin infusions without any issues.     Patient is to RTC Monday- Friday for radiation therapy, 11/30/2020 for C3 MD visit/labs, and on 12/1/2020 for cycle 3 of chemotherapy.

## 2020-11-25 ENCOUNTER — DOCUMENTATION ONLY (OUTPATIENT)
Dept: RADIATION ONCOLOGY | Facility: CLINIC | Age: 65
End: 2020-11-25

## 2020-11-25 PROCEDURE — G6002 PR STEREOSCOPIC XRAY GUIDE FOR RADIATION TX DELIV: ICD-10-PCS | Mod: 26,HCNC,, | Performed by: RADIOLOGY

## 2020-11-25 PROCEDURE — 77386 HC IMRT, COMPLEX: CPT | Mod: HCNC | Performed by: RADIOLOGY

## 2020-11-25 PROCEDURE — G6002 STEREOSCOPIC X-RAY GUIDANCE: HCPCS | Mod: 26,HCNC,, | Performed by: RADIOLOGY

## 2020-11-27 ENCOUNTER — PATIENT MESSAGE (OUTPATIENT)
Dept: FAMILY MEDICINE | Facility: CLINIC | Age: 65
End: 2020-11-27

## 2020-11-27 DIAGNOSIS — E11.9 TYPE 2 DIABETES MELLITUS WITH HEMOGLOBIN A1C GOAL OF LESS THAN 8.0%: ICD-10-CM

## 2020-11-27 DIAGNOSIS — E11.9 TYPE 2 DIABETES MELLITUS WITHOUT COMPLICATION, WITHOUT LONG-TERM CURRENT USE OF INSULIN: Primary | ICD-10-CM

## 2020-11-27 RX ORDER — BIOTIN 1 MG
TABLET ORAL
Qty: 100 STRIP | Refills: 1 | OUTPATIENT
Start: 2020-11-27

## 2020-11-30 ENCOUNTER — OFFICE VISIT (OUTPATIENT)
Dept: HEMATOLOGY/ONCOLOGY | Facility: CLINIC | Age: 65
End: 2020-11-30
Payer: MEDICARE

## 2020-11-30 ENCOUNTER — LAB VISIT (OUTPATIENT)
Dept: LAB | Facility: HOSPITAL | Age: 65
End: 2020-11-30
Attending: INTERNAL MEDICINE
Payer: MEDICARE

## 2020-11-30 ENCOUNTER — RESEARCH ENCOUNTER (OUTPATIENT)
Dept: RESEARCH | Facility: HOSPITAL | Age: 65
End: 2020-11-30

## 2020-11-30 VITALS
BODY MASS INDEX: 35.01 KG/M2 | WEIGHT: 190.25 LBS | HEIGHT: 62 IN | DIASTOLIC BLOOD PRESSURE: 64 MMHG | OXYGEN SATURATION: 96 % | SYSTOLIC BLOOD PRESSURE: 115 MMHG | HEART RATE: 88 BPM | TEMPERATURE: 99 F | RESPIRATION RATE: 20 BRPM

## 2020-11-30 DIAGNOSIS — Z85.3 HISTORY OF BREAST CANCER IN FEMALE: ICD-10-CM

## 2020-11-30 DIAGNOSIS — I42.0 DILATED CARDIOMYOPATHY: ICD-10-CM

## 2020-11-30 DIAGNOSIS — Z00.6 EXAMINATION OF PARTICIPANT IN CLINICAL TRIAL: ICD-10-CM

## 2020-11-30 DIAGNOSIS — Z95.810 CARDIAC RESYNCHRONIZATION THERAPY DEFIBRILLATOR (CRT-D) IN PLACE: ICD-10-CM

## 2020-11-30 DIAGNOSIS — J41.0 SIMPLE CHRONIC BRONCHITIS: ICD-10-CM

## 2020-11-30 DIAGNOSIS — I10 ESSENTIAL HYPERTENSION: ICD-10-CM

## 2020-11-30 DIAGNOSIS — Z00.6 CLINICAL TRIAL PARTICIPANT: ICD-10-CM

## 2020-11-30 DIAGNOSIS — I44.7 LEFT BUNDLE-BRANCH BLOCK: ICD-10-CM

## 2020-11-30 DIAGNOSIS — C34.91 NSCLC OF RIGHT LUNG: ICD-10-CM

## 2020-11-30 DIAGNOSIS — E11.9 TYPE 2 DIABETES MELLITUS WITHOUT COMPLICATION, WITHOUT LONG-TERM CURRENT USE OF INSULIN: ICD-10-CM

## 2020-11-30 DIAGNOSIS — C34.91 NSCLC OF RIGHT LUNG: Primary | ICD-10-CM

## 2020-11-30 LAB
ALBUMIN SERPL BCP-MCNC: 3.2 G/DL (ref 3.5–5.2)
ALP SERPL-CCNC: 47 U/L (ref 55–135)
ALT SERPL W/O P-5'-P-CCNC: 28 U/L (ref 10–44)
ANION GAP SERPL CALC-SCNC: 9 MMOL/L (ref 8–16)
AST SERPL-CCNC: 22 U/L (ref 10–40)
BASOPHILS # BLD AUTO: 0.02 K/UL (ref 0–0.2)
BASOPHILS NFR BLD: 0.6 % (ref 0–1.9)
BILIRUB SERPL-MCNC: 0.4 MG/DL (ref 0.1–1)
BUN SERPL-MCNC: 11 MG/DL (ref 8–23)
CALCIUM SERPL-MCNC: 9.1 MG/DL (ref 8.7–10.5)
CHLORIDE SERPL-SCNC: 106 MMOL/L (ref 95–110)
CO2 SERPL-SCNC: 27 MMOL/L (ref 23–29)
CREAT SERPL-MCNC: 0.8 MG/DL (ref 0.5–1.4)
DIFFERENTIAL METHOD: ABNORMAL
DRUG STUDY SPECIMEN TYPE: NORMAL
DRUG STUDY TEST NAME: NORMAL
DRUG STUDY TEST RESULT: NORMAL
EOSINOPHIL # BLD AUTO: 0.1 K/UL (ref 0–0.5)
EOSINOPHIL NFR BLD: 1.5 % (ref 0–8)
ERYTHROCYTE [DISTWIDTH] IN BLOOD BY AUTOMATED COUNT: 14.6 % (ref 11.5–14.5)
EST. GFR  (AFRICAN AMERICAN): >60 ML/MIN/1.73 M^2
EST. GFR  (NON AFRICAN AMERICAN): >60 ML/MIN/1.73 M^2
GLUCOSE SERPL-MCNC: 165 MG/DL (ref 70–110)
HCT VFR BLD AUTO: 36.6 % (ref 37–48.5)
HGB BLD-MCNC: 10.9 G/DL (ref 12–16)
IMM GRANULOCYTES # BLD AUTO: 0.03 K/UL (ref 0–0.04)
IMM GRANULOCYTES NFR BLD AUTO: 0.9 % (ref 0–0.5)
LYMPHOCYTES # BLD AUTO: 0.6 K/UL (ref 1–4.8)
LYMPHOCYTES NFR BLD: 16.8 % (ref 18–48)
MCH RBC QN AUTO: 27.4 PG (ref 27–31)
MCHC RBC AUTO-ENTMCNC: 29.8 G/DL (ref 32–36)
MCV RBC AUTO: 92 FL (ref 82–98)
MONOCYTES # BLD AUTO: 0.3 K/UL (ref 0.3–1)
MONOCYTES NFR BLD: 9.5 % (ref 4–15)
NEUTROPHILS # BLD AUTO: 2.3 K/UL (ref 1.8–7.7)
NEUTROPHILS NFR BLD: 70.7 % (ref 38–73)
NRBC BLD-RTO: 0 /100 WBC
PLATELET # BLD AUTO: 158 K/UL (ref 150–350)
PMV BLD AUTO: 11.3 FL (ref 9.2–12.9)
POTASSIUM SERPL-SCNC: 4.7 MMOL/L (ref 3.5–5.1)
PROT SERPL-MCNC: 6.5 G/DL (ref 6–8.4)
RBC # BLD AUTO: 3.98 M/UL (ref 4–5.4)
SODIUM SERPL-SCNC: 142 MMOL/L (ref 136–145)
WBC # BLD AUTO: 3.27 K/UL (ref 3.9–12.7)

## 2020-11-30 PROCEDURE — 3008F BODY MASS INDEX DOCD: CPT | Mod: HCNC,CPTII,S$GLB, | Performed by: INTERNAL MEDICINE

## 2020-11-30 PROCEDURE — 99999 PR PBB SHADOW E&M-EST. PATIENT-LVL V: ICD-10-PCS | Mod: PBBFAC,HCNC,, | Performed by: INTERNAL MEDICINE

## 2020-11-30 PROCEDURE — 36415 COLL VENOUS BLD VENIPUNCTURE: CPT | Mod: HCNC

## 2020-11-30 PROCEDURE — 3051F PR MOST RECENT HEMOGLOBIN A1C LEVEL 7.0 - < 8.0%: ICD-10-PCS | Mod: HCNC,CPTII,S$GLB, | Performed by: INTERNAL MEDICINE

## 2020-11-30 PROCEDURE — 1126F AMNT PAIN NOTED NONE PRSNT: CPT | Mod: HCNC,S$GLB,, | Performed by: INTERNAL MEDICINE

## 2020-11-30 PROCEDURE — 77336 RADIATION PHYSICS CONSULT: CPT | Mod: HCNC | Performed by: RADIOLOGY

## 2020-11-30 PROCEDURE — 3074F PR MOST RECENT SYSTOLIC BLOOD PRESSURE < 130 MM HG: ICD-10-PCS | Mod: HCNC,CPTII,S$GLB, | Performed by: INTERNAL MEDICINE

## 2020-11-30 PROCEDURE — 3078F DIAST BP <80 MM HG: CPT | Mod: HCNC,CPTII,S$GLB, | Performed by: INTERNAL MEDICINE

## 2020-11-30 PROCEDURE — 3008F PR BODY MASS INDEX (BMI) DOCUMENTED: ICD-10-PCS | Mod: HCNC,CPTII,S$GLB, | Performed by: INTERNAL MEDICINE

## 2020-11-30 PROCEDURE — 99000 SPECIMEN HANDLING OFFICE-LAB: CPT | Mod: HCNC

## 2020-11-30 PROCEDURE — 80053 COMPREHEN METABOLIC PANEL: CPT | Mod: HCNC

## 2020-11-30 PROCEDURE — 3074F SYST BP LT 130 MM HG: CPT | Mod: HCNC,CPTII,S$GLB, | Performed by: INTERNAL MEDICINE

## 2020-11-30 PROCEDURE — 99215 OFFICE O/P EST HI 40 MIN: CPT | Mod: HCNC,S$GLB,, | Performed by: INTERNAL MEDICINE

## 2020-11-30 PROCEDURE — 99999 PR PBB SHADOW E&M-EST. PATIENT-LVL V: CPT | Mod: PBBFAC,HCNC,, | Performed by: INTERNAL MEDICINE

## 2020-11-30 PROCEDURE — G6002 STEREOSCOPIC X-RAY GUIDANCE: HCPCS | Mod: 26,HCNC,, | Performed by: RADIOLOGY

## 2020-11-30 PROCEDURE — 85025 COMPLETE CBC W/AUTO DIFF WBC: CPT | Mod: HCNC,Q1

## 2020-11-30 PROCEDURE — 1126F PR PAIN SEVERITY QUANTIFIED, NO PAIN PRESENT: ICD-10-PCS | Mod: HCNC,S$GLB,, | Performed by: INTERNAL MEDICINE

## 2020-11-30 PROCEDURE — 77386 HC IMRT, COMPLEX: CPT | Mod: HCNC | Performed by: RADIOLOGY

## 2020-11-30 PROCEDURE — 3078F PR MOST RECENT DIASTOLIC BLOOD PRESSURE < 80 MM HG: ICD-10-PCS | Mod: HCNC,CPTII,S$GLB, | Performed by: INTERNAL MEDICINE

## 2020-11-30 PROCEDURE — G6002 PR STEREOSCOPIC XRAY GUIDE FOR RADIATION TX DELIV: ICD-10-PCS | Mod: 26,HCNC,, | Performed by: RADIOLOGY

## 2020-11-30 PROCEDURE — 3051F HG A1C>EQUAL 7.0%<8.0%: CPT | Mod: HCNC,CPTII,S$GLB, | Performed by: INTERNAL MEDICINE

## 2020-11-30 PROCEDURE — 99215 PR OFFICE/OUTPT VISIT, EST, LEVL V, 40-54 MIN: ICD-10-PCS | Mod: HCNC,S$GLB,, | Performed by: INTERNAL MEDICINE

## 2020-11-30 RX ORDER — DIPHENHYDRAMINE HYDROCHLORIDE 50 MG/ML
50 INJECTION INTRAMUSCULAR; INTRAVENOUS ONCE AS NEEDED
Status: CANCELLED | OUTPATIENT
Start: 2020-12-01

## 2020-11-30 RX ORDER — HEPARIN 100 UNIT/ML
500 SYRINGE INTRAVENOUS
Status: CANCELLED | OUTPATIENT
Start: 2020-12-01

## 2020-11-30 RX ORDER — FAMOTIDINE 10 MG/ML
20 INJECTION INTRAVENOUS
Status: CANCELLED | OUTPATIENT
Start: 2020-12-01

## 2020-11-30 RX ORDER — EPINEPHRINE 0.3 MG/.3ML
0.3 INJECTION SUBCUTANEOUS ONCE AS NEEDED
Status: CANCELLED | OUTPATIENT
Start: 2020-12-01

## 2020-11-30 RX ORDER — SODIUM CHLORIDE 0.9 % (FLUSH) 0.9 %
10 SYRINGE (ML) INJECTION
Status: CANCELLED | OUTPATIENT
Start: 2020-12-01

## 2020-11-30 NOTE — PROGRESS NOTES
"Protocol: Randomized Phase III Trial of [id number] (Durvalumab) as Concurrent and Consolidative Therapy or Consolidative Therapy Alone for Unresectable Stage 3 NSCLC   Protocol # [id number]          IRB # 2020.326  PI: Carmen Akhtar MD  Treating Physician: Javi Avina MD  Study ID #: [id number]  Assigned ARM: ARM B/Investigator Regimen Option# 3 Concurrent Chemo/RT with Carboplatin and Taxol    November 30, 2020    Cycle 3 MD visit    Patient seen in clinic today for her C3 MD visit. Labs and H&P completed. Con meds and AEs reviewed. She "can't believe how great" she feels. She states that she is starting to" feel like her old self again". Patient reacted to Taxol with cycle 1. She is taking her home dexamethasone as prescribed.     Current AEs    1. Dyspnea- Grade 1- Resolved. Patient has a history of COPD requiring the use of multiple inhalers. She has baseline dyspnea on moderate exertion and requires rescue inhaler PRN. Patient states, "It is gone." She denies any dyspnea.  2. Anxiety- Grade 2- Patient takes Wellbutrin and Zoloft. She is worried about her daughter's health.  3. Depression- Grade 2- Patient takes Wellbutrin and Zoloft.  4. Hypertension- Grade 2 (intermittent)-  Patient states that her SBP is usually around 140 and her DBP is usually around 80. Her BP in clinic today is 115/64. She takes Coreg, Lisinopril, and Lasix.   5. Hyperglycemia- Grade 2- Patient has history of DM Type II. She takes Metformin for this. (Patient did eat prior to labs today). She has been monitoring her blood glucose at home and it has been ranging 90s -120s fasting per patient.   6. Pain- Grade 1- Resolved. Denies any pain.  7. Diarrhea-(not graded/baseline issue) Patient has loose watery stools after taking her Metformin. She states that she usually has about 4 loose stools in the AM and 2 loose stools in the PM. Her baseline # of stools is about 6 a day. Dr. Avina made aware. This should not be an issue since it is relted to her " "Metformin. She controls this with Imodium PRN. Will monitor. Patient educated on s/s to notify physician of.   8. Anorexia- Grade 2- Her appetite has improved since starting dexamethasone for Taxol infusion prophylaxis. She is eating well and drinks Glucerna. She has been eating less carbs since living with her daughter.   9. Anemia- Grade 1 (baseline issue/intermittent) Hgb is 10.9 g/dl on today's labs.   10. Fatigue- Grade 2 (baseline issue/intermittent)- Resolved.  She denies any fatigue. She is no longer napping throughout the day and is sleeping better at night.   11. Esophagitis- Grade 1- Noticed "heartburn" a couple of times. Per Dr. Avina this is a grade 1 esophagitis related to radiation.     Labs and AEs are within parameters for treatment. Dosing timeout performed with Dr. Avina. Taxol dosage calculated with screening weight 87.2 kg  (BSA 1.95 m2) per protocol. Carboplatin calculated with screening weight and C3D1 labs. Referred to study chair Dr. Trujillo's protocol clarification email. Orders signed by MD.      Of note, patient consented yes to optional specimens. Step 1 Week 3 of CRT specimens shipped today per protocol. [employer] Tracking # [id number].                       "

## 2020-11-30 NOTE — PROGRESS NOTES
ONCOLOGY FOLLOW UP VISIT.     Reason for visit: Toxicity check on SOC chemoradiation on [id number] clinical trial for stage IIIA NSCLC    Best Contact Phone Number(s): [phone] (home)      Cancer/Stage/TNM:   Cancer Staging  NSCLC of right lung  Staging form: Lung, AJCC 8th Edition  - Clinical stage from 9/29/2020: Stage IIIA (cT3, cN1, cM0) - Signed by Ramo Tucker MD on 10/24/2020       Oncology History   NSCLC of right lung   9/29/2020 Cancer Staged    Staging form: Lung, AJCC 8th Edition  - Clinical stage from 9/29/2020: Stage IIIA (cT3, cN1, cM0)     10/14/2020 Initial Diagnosis    NSCLC of right lung          Interval History:   Today patient reports no new symptoms.  She has some SINGH since surgery, but improves with rescue inhaler.     ROS:  Review of Systems   Constitutional: Negative for activity change, appetite change, chills, fatigue, fever and unexpected weight change.   HENT: Negative for mouth sores, nosebleeds and sore throat.    Eyes: Negative for visual disturbance.   Respiratory: Negative for cough, shortness of breath and wheezing.    Cardiovascular: Negative for chest pain, palpitations and leg swelling.   Gastrointestinal: Negative for abdominal distention, abdominal pain, blood in stool and diarrhea.   Endocrine: Negative for cold intolerance and heat intolerance.   Genitourinary: Positive for frequency. Negative for dysuria and flank pain.   Musculoskeletal: Negative for arthralgias, back pain, joint swelling and myalgias.   Skin: Negative for color change, rash and wound.   Neurological: Negative for dizziness, light-headedness and headaches.   Hematological: Negative for adenopathy. Does not bruise/bleed easily.   Psychiatric/Behavioral: The patient is not nervous/anxious.       A complete 12-point review of systems was reviewed and is negative except as mentioned above.     Past Medical History:   Past Medical History:   Diagnosis Date    AICD (automatic cardioverter/defibrillator)  present     Asthma     bronchitis in past    Breast cancer 2016    right    Cardiac pacemaker     Cardiomyopathy     COPD (chronic obstructive pulmonary disease)     Diabetes mellitus, type 2     Hyperglycemia     Hyperlipidemia     Hypertension     Malignant neoplasm of overlapping sites of female breast 2/12/2016    Nuclear sclerosis of both eyes 8/12/2020    Respiratory distress 3/12/2020        Allergies:   Review of patient's allergies indicates:   Allergen Reactions    Taxol [paclitaxel]      Hypersensitivity reaction to taxol, symptoms included shortness of breath, nausea, dizziness, flushing     Adhesive Rash     tegaderm burns and blistered skin        Medications:   Current Outpatient Medications   Medication Sig Dispense Refill    albuterol sulfate (PROAIR RESPICLICK) 90 mcg/actuation AePB Inhale 2 puffs into the lungs every 4 (four) hours as needed. Rescue 1 each 5    atorvastatin (LIPITOR) 10 MG tablet Take 1 tablet (10 mg total) by mouth once daily. (Patient taking differently: Take 10 mg by mouth every evening. ) 90 tablet 3    blood sugar diagnostic Strp To check BG 3 times daily, to use with insurance preferred meter 200 each 3    buPROPion (WELLBUTRIN XL) 150 MG TB24 tablet TAKE 1 TABLET BY MOUTH EVERY DAY (Patient taking differently: Take 150 mg by mouth every morning. ) 90 tablet 1    carvediloL (COREG) 25 MG tablet TAKE 1 TABLET(25 MG) BY MOUTH TWICE DAILY 180 tablet 3    cetirizine (ZYRTEC) 10 MG tablet Take 10 mg by mouth every evening.       dexAMETHasone (DECADRON) 4 MG Tab Take 2 tablets (8 mg total) by mouth once daily. Take the 1 day before, 6 hours prior, and on days 2 and 3 of your chemotherapy cycle. for 20 days 40 tablet 0    fluticasone-salmeterol diskus inhaler 250-50 mcg INHALE 1 PUFF INTO THE LUNGS 2 (TWO) TIMES DAILY. (Patient taking differently: INHALE 1 PUFF INTO THE LUNGS 2 (TWO) TIMES DAILY.) 60 each 5    furosemide (LASIX) 40 MG tablet Take 1 tablet  (40 mg total) by mouth once daily. TAKE 1 TABLET(40 MG) BY MOUTH TWICE DAILY 90 tablet 1    lidocaine-prilocaine (EMLA) cream Apply topically as needed. 30 g 3    metFORMIN (GLUCOPHAGE) 500 MG tablet Take 1 tablet (500 mg total) by mouth 2 (two) times daily. 180 tablet 1    multivitamin (ONE DAILY MULTIVITAMIN) per tablet Take 1 tablet by mouth once daily.      sertraline (ZOLOFT) 100 MG tablet Take 1 tablet (100 mg total) by mouth every evening. 90 tablet 1    SPIRIVA WITH HANDIHALER 18 mcg inhalation capsule INHALE THE CONTENTS OF 1 CAPSULE EVERY DAY 90 capsule 0    albuterol-ipratropium (DUO-NEB) 2.5 mg-0.5 mg/3 mL nebulizer solution Take 3 mLs by nebulization every 6 (six) hours while awake. Rescue (Patient not taking: Reported on 11/30/2020) 30 mL 5    ondansetron (ZOFRAN-ODT) 8 MG TbDL Take 1 tablet (8 mg total) by mouth every 12 (twelve) hours as needed. (Patient not taking: Reported on 11/30/2020) 30 tablet 1    oxyCODONE (ROXICODONE) 5 MG immediate release tablet Take 1 tablet (5 mg total) by mouth every 4 (four) hours as needed for Pain. (Patient not taking: Reported on 11/9/2020) 30 tablet 0    oxyCODONE-acetaminophen (PERCOCET) 5-325 mg per tablet Take 1 tablet by mouth every 4 (four) hours as needed for Pain. (Patient not taking: Reported on 11/30/2020) 10 tablet 0    prochlorperazine (COMPAZINE) 5 MG tablet Take 1 tablet (5 mg total) by mouth every 6 (six) hours as needed for Nausea. (Patient not taking: Reported on 11/30/2020) 30 tablet 1     No current facility-administered medications for this visit.      Facility-Administered Medications Ordered in Other Visits   Medication Dose Route Frequency Provider Last Rate Last Dose    fentaNYL injection 25 mcg  25 mcg Intravenous Q5 Min PRN Keesha Martins MD        haloperidol lactate injection 0.5 mg  0.5 mg Intravenous Once PRN Keesha Martins MD        HYDROmorphone injection 0.2 mg  0.2 mg Intravenous Q5 Min PRN Keesha Martins MD        ondansetron  "injection 4 mg  4 mg Intravenous Once PRN Keesha Martins MD        sodium chloride 0.9% flush 10 mL  10 mL Intravenous PRN Keesha Martins MD            Physical Exam:   /64 (BP Location: Left arm, Patient Position: Sitting, BP Method: Large (Automatic))   Pulse 88   Temp 98.9 °F (37.2 °C) (Oral)   Resp 20   Ht 5' 2" (1.575 m)   Wt 86.3 kg (190 lb 4.1 oz)   LMP  (LMP Unknown)   SpO2 96%   BMI 34.80 kg/m²      ECOG Performance Status: (foot note - ECOG PS provided by Eastern Co[employer] Oncology Group) 0 - Asymptomatic    Physical Exam  Constitutional:       Appearance: Normal appearance. She is well-developed.   HENT:      Head: Normocephalic and atraumatic.   Eyes:      Conjunctiva/sclera: Conjunctivae normal.      Pupils: Pupils are equal, round, and reactive to light.   Neck:      Musculoskeletal: Normal range of motion and neck supple.   Pulmonary:      Effort: Pulmonary effort is normal. No respiratory distress.   Abdominal:      General: There is no distension.      Palpations: Abdomen is soft.   Musculoskeletal: Normal range of motion.         General: No swelling.   Lymphadenopathy:      Cervical: No cervical adenopathy.   Skin:     General: Skin is warm and dry.   Neurological:      General: No focal deficit present.      Mental Status: She is alert and oriented to person, place, and time.   Psychiatric:         Mood and Affect: Mood normal.         Behavior: Behavior normal.           Labs:   Recent Results (from the past 48 hour(s))   CBC W/ AUTO DIFFERENTIAL    Collection Time: 11/30/20 10:26 AM   Result Value Ref Range    WBC 3.27 (L) 3.90 - 12.70 K/uL    RBC 3.98 (L) 4.00 - 5.40 M/uL    Hemoglobin 10.9 (L) 12.0 - 16.0 g/dL    Hematocrit 36.6 (L) 37.0 - 48.5 %    MCV 92 82 - 98 fL    MCH 27.4 27.0 - 31.0 pg    MCHC 29.8 (L) 32.0 - 36.0 g/dL    RDW 14.6 (H) 11.5 - 14.5 %    Platelets 158 150 - 350 K/uL    MPV 11.3 9.2 - 12.9 fL    Immature Granulocytes 0.9 (H) 0.0 - 0.5 %    Gran # (ANC) 2.3 1.8 - " 7.7 K/uL    Immature Grans (Abs) 0.03 0.00 - 0.04 K/uL    Lymph # 0.6 (L) 1.0 - 4.8 K/uL    Mono # 0.3 0.3 - 1.0 K/uL    Eos # 0.1 0.0 - 0.5 K/uL    Baso # 0.02 0.00 - 0.20 K/uL    nRBC 0 0 /100 WBC    Gran % 70.7 38.0 - 73.0 %    Lymph % 16.8 (L) 18.0 - 48.0 %    Mono % 9.5 4.0 - 15.0 %    Eosinophil % 1.5 0.0 - 8.0 %    Basophil % 0.6 0.0 - 1.9 %    Differential Method Automated    COMPREHENSIVE METABOLIC PANEL    Collection Time: 11/30/20 10:26 AM   Result Value Ref Range    Sodium 142 136 - 145 mmol/L    Potassium 4.7 3.5 - 5.1 mmol/L    Chloride 106 95 - 110 mmol/L    CO2 27 23 - 29 mmol/L    Glucose 165 (H) 70 - 110 mg/dL    BUN 11 8 - 23 mg/dL    Creatinine 0.8 0.5 - 1.4 mg/dL    Calcium 9.1 8.7 - 10.5 mg/dL    Total Protein 6.5 6.0 - 8.4 g/dL    Albumin 3.2 (L) 3.5 - 5.2 g/dL    Total Bilirubin 0.4 0.1 - 1.0 mg/dL    Alkaline Phosphatase 47 (L) 55 - 135 U/L    AST 22 10 - 40 U/L    ALT 28 10 - 44 U/L    Anion Gap 9 8 - 16 mmol/L    eGFR if African American >60.0 >60 mL/min/1.73 m^2    eGFR if non African American >60.0 >60 mL/min/1.73 m^2   [place] [facility] [place] Time: 11/30/20 10:26 AM   Result Value Ref Range    Drug Study Test Name Drug Study     Drug Study Specimen Type blood     Drug Study Test Result Result sent directly to physician         Imaging:  Cardiac device check - Remote  Additional Comments  REMOTE Interrogation Report    Presenting egram demonstrates:  AS/BiV Paced  Device fxn WNL  RA pacing  0%, V pacing  100%  Atrial arrhythmias:  none  Ventricular arrhythmias:  none  Battery Longevity/Status:  3.4 years  Return to device clinic in  October 2021.    Report prepared by: Maria Ines B.            Diagnoses:       1. NSCLC of right lung    2. Clinical trial participant    3. History of breast cancer in female    4. Dilated cardiomyopathy    5. Left bundle-branch block    6. Cardiac resynchronization therapy defibrillator (CRT-D) in place    7. Essential hypertension    8. Simple  chronic bronchitis    9. Type 2 diabetes mellitus without complication, without long-term current use of insulin          Assessment and Plan:         1,2. Stage IIIA NSCLC  Plan is for definitive chemoRT, will need re-staging scans prior.  Reviewed with patient in detail her diagnosis, stage, treatment options, and prognosis (3 year OS 66% vs. 43.5% without durvalumab) with standard of care chemoRT followed by maintenance immunotherapy.  Patient has consented for [id number] clinical trial, a randomized control trial evaluating combination chemoRT + durvalumab followed by maintenance vs. SOC chemoRT -> maintenance.  - patient randomized on [id number] to SOC arm.  Patient has a good performance status ECOG 0 and life expectancy > 12 weeks.  Tolerating treatment well other than paclitaxel reaction, but did not have a reaction since starting pre-medication steroids.  Ok to continue per protocol.     3. 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      4,5,6 Stable, follows with cardiology.  AICD placed, but patient now has recovered EF and only takes lasix prn.  NYHA class I.     7 Controlled continue to follow with PCP and cardiologist.     8 Controlled on inhalers     9 Last HbA1c 7, controlled            Greater than 50% of this time involved counseling or coordination of care. I have provided the patient with an opportunity to ask questions and have all questions answered to his satisfaction.     she will return to clinic per protocol, but knows to call in the interim if symptoms change or should a problem arise.    Javi Avina MD  Medical Oncology   [facility]  [facility]

## 2020-12-01 ENCOUNTER — INFUSION (OUTPATIENT)
Dept: INFUSION THERAPY | Facility: HOSPITAL | Age: 65
End: 2020-12-01
Payer: MEDICARE

## 2020-12-01 ENCOUNTER — HOSPITAL ENCOUNTER (OUTPATIENT)
Dept: RADIATION THERAPY | Facility: HOSPITAL | Age: 65
Discharge: HOME | End: 2020-12-01
Attending: RADIOLOGY
Payer: MEDICARE

## 2020-12-01 ENCOUNTER — RESEARCH ENCOUNTER (OUTPATIENT)
Dept: RESEARCH | Facility: HOSPITAL | Age: 65
End: 2020-12-01

## 2020-12-01 VITALS
HEART RATE: 78 BPM | RESPIRATION RATE: 18 BRPM | BODY MASS INDEX: 35.01 KG/M2 | OXYGEN SATURATION: 97 % | SYSTOLIC BLOOD PRESSURE: 143 MMHG | WEIGHT: 190.25 LBS | DIASTOLIC BLOOD PRESSURE: 66 MMHG | HEIGHT: 62 IN | TEMPERATURE: 98 F

## 2020-12-01 DIAGNOSIS — R91.8 LUNG MASS: Primary | ICD-10-CM

## 2020-12-01 PROCEDURE — 96375 TX/PRO/DX INJ NEW DRUG ADDON: CPT | Mod: HCNC,Q1

## 2020-12-01 PROCEDURE — 96417 CHEMO IV INFUS EACH ADDL SEQ: CPT | Mod: HCNC,Q1

## 2020-12-01 PROCEDURE — 77417 THER RADIOLOGY PORT IMAGE(S): CPT | Mod: HCNC | Performed by: RADIOLOGY

## 2020-12-01 PROCEDURE — G6002 STEREOSCOPIC X-RAY GUIDANCE: HCPCS | Mod: 26,HCNC,, | Performed by: RADIOLOGY

## 2020-12-01 PROCEDURE — G6002 PR STEREOSCOPIC XRAY GUIDE FOR RADIATION TX DELIV: ICD-10-PCS | Mod: 26,HCNC,, | Performed by: RADIOLOGY

## 2020-12-01 PROCEDURE — 96367 TX/PROPH/DG ADDL SEQ IV INF: CPT | Mod: HCNC

## 2020-12-01 PROCEDURE — 25000003 PHARM REV CODE 250: Mod: HCNC,Q1 | Performed by: INTERNAL MEDICINE

## 2020-12-01 PROCEDURE — 63600175 PHARM REV CODE 636 W HCPCS: Mod: HCNC | Performed by: INTERNAL MEDICINE

## 2020-12-01 PROCEDURE — 96413 CHEMO IV INFUSION 1 HR: CPT | Mod: HCNC,Q1

## 2020-12-01 PROCEDURE — 77386 HC IMRT, COMPLEX: CPT | Mod: HCNC | Performed by: RADIOLOGY

## 2020-12-01 PROCEDURE — A4216 STERILE WATER/SALINE, 10 ML: HCPCS | Mod: HCNC | Performed by: INTERNAL MEDICINE

## 2020-12-01 RX ORDER — HEPARIN 100 UNIT/ML
500 SYRINGE INTRAVENOUS
Status: DISCONTINUED | OUTPATIENT
Start: 2020-12-01 | End: 2020-12-01 | Stop reason: HOSPADM

## 2020-12-01 RX ORDER — DIPHENHYDRAMINE HYDROCHLORIDE 50 MG/ML
50 INJECTION INTRAMUSCULAR; INTRAVENOUS ONCE AS NEEDED
Status: DISCONTINUED | OUTPATIENT
Start: 2020-12-01 | End: 2020-12-01 | Stop reason: HOSPADM

## 2020-12-01 RX ORDER — FAMOTIDINE 10 MG/ML
20 INJECTION INTRAVENOUS
Status: COMPLETED | OUTPATIENT
Start: 2020-12-01 | End: 2020-12-01

## 2020-12-01 RX ORDER — EPINEPHRINE 0.3 MG/.3ML
0.3 INJECTION SUBCUTANEOUS ONCE AS NEEDED
Status: DISCONTINUED | OUTPATIENT
Start: 2020-12-01 | End: 2020-12-01 | Stop reason: HOSPADM

## 2020-12-01 RX ORDER — SODIUM CHLORIDE 0.9 % (FLUSH) 0.9 %
10 SYRINGE (ML) INJECTION
Status: DISCONTINUED | OUTPATIENT
Start: 2020-12-01 | End: 2020-12-01 | Stop reason: HOSPADM

## 2020-12-01 RX ADMIN — DEXAMETHASONE SODIUM PHOSPHATE 0.25 MG: 4 INJECTION, SOLUTION INTRA-ARTICULAR; INTRALESIONAL; INTRAMUSCULAR; INTRAVENOUS; SOFT TISSUE at 10:12

## 2020-12-01 RX ADMIN — FAMOTIDINE 20 MG: 10 INJECTION INTRAVENOUS at 10:12

## 2020-12-01 RX ADMIN — DIPHENHYDRAMINE HYDROCHLORIDE 50 MG: 50 INJECTION INTRAMUSCULAR; INTRAVENOUS at 10:12

## 2020-12-01 RX ADMIN — Medication 10 ML: at 01:12

## 2020-12-01 RX ADMIN — SODIUM CHLORIDE: 0.9 INJECTION, SOLUTION INTRAVENOUS at 10:12

## 2020-12-01 RX ADMIN — HEPARIN 500 UNITS: 100 SYRINGE at 01:12

## 2020-12-01 RX ADMIN — CARBOPLATIN 250 MG: 10 INJECTION INTRAVENOUS at 01:12

## 2020-12-01 RX ADMIN — PACLITAXEL 90 MG: 6 INJECTION, SOLUTION, CONCENTRATE INTRAVENOUS at 11:12

## 2020-12-01 RX ADMIN — APREPITANT 130 MG: 130 INJECTION, EMULSION INTRAVENOUS at 10:12

## 2020-12-01 NOTE — PLAN OF CARE
1020-Labs , hx, and medications reviewed, patient was seen by MD and research RN yesterday. Assessment completed, patient states she took PO steroid 6 hours prior as instructed. Discussed plan of care with patient. Patient in agreement. Chair reclined and warm blanket and snack offered.

## 2020-12-01 NOTE — PLAN OF CARE
1404-. All medications were administered per infusion guidelines provided by research RN.  Patient tolerated treatment without signs or symptoms of infusion reaction. Taxol infused through port from 1152 to 1257 and carboplatin infused from 1322 to 1354.  Port was flushed and heparin locked and then de-accessed and patient discharged to radiation appointment.  Patient instructed to contact MD with any issues or concerns and to take PO steroid Wednesday and Thursday as instructed.  Patient verbalized understanding.

## 2020-12-01 NOTE — PROGRESS NOTES
Protocol: Randomized Phase III Trial of [id number] (Durvalumab) as Concurrent and Consolidative Therapy or Consolidative Therapy Alone for Unresectable Stage 3 NSCLC   Protocol # RC3824          IRB # [id number]  PI: Carmen Akhtar MD  Treating Physician: Javi Avina MD  Study ID #: [id number]  Assigned ARM: ARM B/Investigator Regimen Option# 3 Concurrent Chemo/RT with Carboplatin and Taxol    December 1, 2020    Cycle 3 infusion    Patient seen in chemotherapy infusion suite today for C3 of Taxol/Carboplatin. Chemo nurse Elham Ng RN educated on infusion guidelines per protocol. She was made aware of patient's prior reaction to Taxol. Patient confirmed that she took home dexamethasone as prescribed.     Patient was able to complete Taxol/Carboplatin infusions without any issues.     Patient is to RTC Monday- Friday for radiation therapy, 12/7/2020 for C4 MD visit/labs, and on 12/8/2020 for cycle 4 of chemotherapy.

## 2020-12-02 ENCOUNTER — DOCUMENTATION ONLY (OUTPATIENT)
Dept: RADIATION ONCOLOGY | Facility: CLINIC | Age: 65
End: 2020-12-02

## 2020-12-02 PROCEDURE — 77014 PR  CT GUIDANCE PLACEMENT RAD THERAPY FIELDS: ICD-10-PCS | Mod: 26,HCNC,, | Performed by: RADIOLOGY

## 2020-12-02 PROCEDURE — 77014 HC CT GUIDANCE RADIATION THERAPY FLDS PLACEMENT: CPT | Mod: TC,HCNC | Performed by: RADIOLOGY

## 2020-12-02 PROCEDURE — 77014 PR  CT GUIDANCE PLACEMENT RAD THERAPY FIELDS: CPT | Mod: 26,HCNC,, | Performed by: RADIOLOGY

## 2020-12-02 PROCEDURE — 77386 HC IMRT, COMPLEX: CPT | Mod: HCNC | Performed by: RADIOLOGY

## 2020-12-03 PROCEDURE — G6002 STEREOSCOPIC X-RAY GUIDANCE: HCPCS | Mod: 26,HCNC,, | Performed by: RADIOLOGY

## 2020-12-03 PROCEDURE — G6002 PR STEREOSCOPIC XRAY GUIDE FOR RADIATION TX DELIV: ICD-10-PCS | Mod: 26,HCNC,, | Performed by: RADIOLOGY

## 2020-12-03 PROCEDURE — 77386 HC IMRT, COMPLEX: CPT | Mod: HCNC | Performed by: RADIOLOGY

## 2020-12-04 PROCEDURE — G6002 STEREOSCOPIC X-RAY GUIDANCE: HCPCS | Mod: 26,HCNC,, | Performed by: RADIOLOGY

## 2020-12-04 PROCEDURE — 77386 HC IMRT, COMPLEX: CPT | Mod: HCNC | Performed by: RADIOLOGY

## 2020-12-04 PROCEDURE — G6002 PR STEREOSCOPIC XRAY GUIDE FOR RADIATION TX DELIV: ICD-10-PCS | Mod: 26,HCNC,, | Performed by: RADIOLOGY

## 2020-12-07 ENCOUNTER — RESEARCH ENCOUNTER (OUTPATIENT)
Dept: RESEARCH | Facility: HOSPITAL | Age: 65
End: 2020-12-07

## 2020-12-07 ENCOUNTER — LAB VISIT (OUTPATIENT)
Dept: LAB | Facility: HOSPITAL | Age: 65
End: 2020-12-07
Attending: INTERNAL MEDICINE
Payer: MEDICARE

## 2020-12-07 ENCOUNTER — OFFICE VISIT (OUTPATIENT)
Dept: HEMATOLOGY/ONCOLOGY | Facility: CLINIC | Age: 65
End: 2020-12-07
Payer: MEDICARE

## 2020-12-07 VITALS
DIASTOLIC BLOOD PRESSURE: 72 MMHG | TEMPERATURE: 98 F | BODY MASS INDEX: 35.06 KG/M2 | HEIGHT: 62 IN | RESPIRATION RATE: 16 BRPM | SYSTOLIC BLOOD PRESSURE: 156 MMHG | HEART RATE: 100 BPM | WEIGHT: 190.5 LBS | OXYGEN SATURATION: 95 %

## 2020-12-07 DIAGNOSIS — Z00.6 CLINICAL TRIAL PARTICIPANT: ICD-10-CM

## 2020-12-07 DIAGNOSIS — Z00.6 EXAMINATION OF PARTICIPANT IN CLINICAL TRIAL: ICD-10-CM

## 2020-12-07 DIAGNOSIS — C34.91 NSCLC OF RIGHT LUNG: Primary | ICD-10-CM

## 2020-12-07 DIAGNOSIS — C34.91 NSCLC OF RIGHT LUNG: ICD-10-CM

## 2020-12-07 LAB
ALBUMIN SERPL BCP-MCNC: 3.2 G/DL (ref 3.5–5.2)
ALP SERPL-CCNC: 48 U/L (ref 55–135)
ALT SERPL W/O P-5'-P-CCNC: 28 U/L (ref 10–44)
ANION GAP SERPL CALC-SCNC: 9 MMOL/L (ref 8–16)
AST SERPL-CCNC: 21 U/L (ref 10–40)
BASOPHILS # BLD AUTO: 0.01 K/UL (ref 0–0.2)
BASOPHILS NFR BLD: 0.4 % (ref 0–1.9)
BILIRUB SERPL-MCNC: 0.4 MG/DL (ref 0.1–1)
BUN SERPL-MCNC: 9 MG/DL (ref 8–23)
CALCIUM SERPL-MCNC: 8.7 MG/DL (ref 8.7–10.5)
CHLORIDE SERPL-SCNC: 105 MMOL/L (ref 95–110)
CO2 SERPL-SCNC: 28 MMOL/L (ref 23–29)
CREAT SERPL-MCNC: 0.8 MG/DL (ref 0.5–1.4)
DIFFERENTIAL METHOD: ABNORMAL
EOSINOPHIL # BLD AUTO: 0 K/UL (ref 0–0.5)
EOSINOPHIL NFR BLD: 0.8 % (ref 0–8)
ERYTHROCYTE [DISTWIDTH] IN BLOOD BY AUTOMATED COUNT: 15.2 % (ref 11.5–14.5)
EST. GFR  (AFRICAN AMERICAN): >60 ML/MIN/1.73 M^2
EST. GFR  (NON AFRICAN AMERICAN): >60 ML/MIN/1.73 M^2
GLUCOSE SERPL-MCNC: 200 MG/DL (ref 70–110)
HCT VFR BLD AUTO: 37.2 % (ref 37–48.5)
HGB BLD-MCNC: 11.1 G/DL (ref 12–16)
IMM GRANULOCYTES # BLD AUTO: 0.02 K/UL (ref 0–0.04)
IMM GRANULOCYTES NFR BLD AUTO: 0.8 % (ref 0–0.5)
LYMPHOCYTES # BLD AUTO: 0.5 K/UL (ref 1–4.8)
LYMPHOCYTES NFR BLD: 20.1 % (ref 18–48)
MCH RBC QN AUTO: 27.3 PG (ref 27–31)
MCHC RBC AUTO-ENTMCNC: 29.8 G/DL (ref 32–36)
MCV RBC AUTO: 92 FL (ref 82–98)
MONOCYTES # BLD AUTO: 0.3 K/UL (ref 0.3–1)
MONOCYTES NFR BLD: 10.5 % (ref 4–15)
NEUTROPHILS # BLD AUTO: 1.6 K/UL (ref 1.8–7.7)
NEUTROPHILS NFR BLD: 67.4 % (ref 38–73)
NRBC BLD-RTO: 0 /100 WBC
PLATELET # BLD AUTO: 135 K/UL (ref 150–350)
PMV BLD AUTO: 11.4 FL (ref 9.2–12.9)
POTASSIUM SERPL-SCNC: 4.2 MMOL/L (ref 3.5–5.1)
PROT SERPL-MCNC: 6.4 G/DL (ref 6–8.4)
RBC # BLD AUTO: 4.06 M/UL (ref 4–5.4)
SODIUM SERPL-SCNC: 142 MMOL/L (ref 136–145)
WBC # BLD AUTO: 2.39 K/UL (ref 3.9–12.7)

## 2020-12-07 PROCEDURE — 99999 PR PBB SHADOW E&M-EST. PATIENT-LVL IV: CPT | Mod: PBBFAC,HCNC,, | Performed by: INTERNAL MEDICINE

## 2020-12-07 PROCEDURE — 3078F DIAST BP <80 MM HG: CPT | Mod: HCNC,CPTII,S$GLB, | Performed by: INTERNAL MEDICINE

## 2020-12-07 PROCEDURE — 3078F PR MOST RECENT DIASTOLIC BLOOD PRESSURE < 80 MM HG: ICD-10-PCS | Mod: HCNC,CPTII,S$GLB, | Performed by: INTERNAL MEDICINE

## 2020-12-07 PROCEDURE — 80053 COMPREHEN METABOLIC PANEL: CPT | Mod: HCNC,Q1

## 2020-12-07 PROCEDURE — 99214 PR OFFICE/OUTPT VISIT, EST, LEVL IV, 30-39 MIN: ICD-10-PCS | Mod: HCNC,S$GLB,, | Performed by: INTERNAL MEDICINE

## 2020-12-07 PROCEDURE — 3077F SYST BP >= 140 MM HG: CPT | Mod: HCNC,CPTII,S$GLB, | Performed by: INTERNAL MEDICINE

## 2020-12-07 PROCEDURE — G6002 STEREOSCOPIC X-RAY GUIDANCE: HCPCS | Mod: 26,HCNC,, | Performed by: RADIOLOGY

## 2020-12-07 PROCEDURE — 85025 COMPLETE CBC W/AUTO DIFF WBC: CPT | Mod: HCNC

## 2020-12-07 PROCEDURE — 3008F PR BODY MASS INDEX (BMI) DOCUMENTED: ICD-10-PCS | Mod: HCNC,CPTII,S$GLB, | Performed by: INTERNAL MEDICINE

## 2020-12-07 PROCEDURE — 1126F PR PAIN SEVERITY QUANTIFIED, NO PAIN PRESENT: ICD-10-PCS | Mod: HCNC,S$GLB,, | Performed by: INTERNAL MEDICINE

## 2020-12-07 PROCEDURE — 3008F BODY MASS INDEX DOCD: CPT | Mod: HCNC,CPTII,S$GLB, | Performed by: INTERNAL MEDICINE

## 2020-12-07 PROCEDURE — 77336 RADIATION PHYSICS CONSULT: CPT | Mod: HCNC | Performed by: RADIOLOGY

## 2020-12-07 PROCEDURE — 1126F AMNT PAIN NOTED NONE PRSNT: CPT | Mod: HCNC,S$GLB,, | Performed by: INTERNAL MEDICINE

## 2020-12-07 PROCEDURE — 3077F PR MOST RECENT SYSTOLIC BLOOD PRESSURE >= 140 MM HG: ICD-10-PCS | Mod: HCNC,CPTII,S$GLB, | Performed by: INTERNAL MEDICINE

## 2020-12-07 PROCEDURE — 77386 HC IMRT, COMPLEX: CPT | Mod: HCNC | Performed by: RADIOLOGY

## 2020-12-07 PROCEDURE — 99999 PR PBB SHADOW E&M-EST. PATIENT-LVL IV: ICD-10-PCS | Mod: PBBFAC,HCNC,, | Performed by: INTERNAL MEDICINE

## 2020-12-07 PROCEDURE — 36415 COLL VENOUS BLD VENIPUNCTURE: CPT | Mod: HCNC

## 2020-12-07 PROCEDURE — 99214 OFFICE O/P EST MOD 30 MIN: CPT | Mod: HCNC,S$GLB,, | Performed by: INTERNAL MEDICINE

## 2020-12-07 PROCEDURE — G6002 PR STEREOSCOPIC XRAY GUIDE FOR RADIATION TX DELIV: ICD-10-PCS | Mod: 26,HCNC,, | Performed by: RADIOLOGY

## 2020-12-07 RX ORDER — HEPARIN 100 UNIT/ML
500 SYRINGE INTRAVENOUS
Status: CANCELLED | OUTPATIENT
Start: 2020-12-08

## 2020-12-07 RX ORDER — FAMOTIDINE 10 MG/ML
20 INJECTION INTRAVENOUS
Status: CANCELLED | OUTPATIENT
Start: 2020-12-08

## 2020-12-07 RX ORDER — EPINEPHRINE 0.3 MG/.3ML
0.3 INJECTION SUBCUTANEOUS ONCE AS NEEDED
Status: CANCELLED | OUTPATIENT
Start: 2020-12-08

## 2020-12-07 RX ORDER — SODIUM CHLORIDE 0.9 % (FLUSH) 0.9 %
10 SYRINGE (ML) INJECTION
Status: CANCELLED | OUTPATIENT
Start: 2020-12-08

## 2020-12-07 RX ORDER — DIPHENHYDRAMINE HYDROCHLORIDE 50 MG/ML
50 INJECTION INTRAMUSCULAR; INTRAVENOUS ONCE AS NEEDED
Status: CANCELLED | OUTPATIENT
Start: 2020-12-08

## 2020-12-07 NOTE — PROGRESS NOTES
ONCOLOGY FOLLOW UP VISIT.     Reason for visit: Toxicity check on SOC chemoradiation on ST4181 clinical trial for stage IIIA NSCLC    Best Contact Phone Number(s): [phone] (home)      Cancer/Stage/TNM:   Cancer Staging  NSCLC of right lung  Staging form: Lung, AJCC 8th Edition  - Clinical stage from 9/29/2020: Stage IIIA (cT3, cN1, cM0) - Signed by Ramo Tucker MD on 10/24/2020       Oncology History   NSCLC of right lung   9/29/2020 Cancer Staged    Staging form: Lung, AJCC 8th Edition  - Clinical stage from 9/29/2020: Stage IIIA (cT3, cN1, cM0)     10/14/2020 Initial Diagnosis    NSCLC of right lung          Interval History:   Today patient reports no new symptoms.  Feeling well.  No new issues.      ROS:  Review of Systems   Constitutional: Negative for activity change, appetite change, chills, fatigue, fever and unexpected weight change.   HENT: Negative for mouth sores, nosebleeds and sore throat.    Eyes: Negative for visual disturbance.   Respiratory: Negative for cough, shortness of breath and wheezing.    Cardiovascular: Negative for chest pain, palpitations and leg swelling.   Gastrointestinal: Negative for abdominal distention, abdominal pain, blood in stool and diarrhea.   Endocrine: Negative for cold intolerance and heat intolerance.   Genitourinary: Positive for frequency. Negative for dysuria and flank pain.   Musculoskeletal: Negative for arthralgias, back pain, joint swelling and myalgias.   Skin: Negative for color change, rash and wound.   Neurological: Negative for dizziness, light-headedness and headaches.   Hematological: Negative for adenopathy. Does not bruise/bleed easily.   Psychiatric/Behavioral: The patient is not nervous/anxious.       A complete 12-point review of systems was reviewed and is negative except as mentioned above.     Past Medical History:   Past Medical History:   Diagnosis Date    AICD (automatic cardioverter/defibrillator) present     Asthma     bronchitis  in past    Breast cancer 2016    right    Cardiac pacemaker     Cardiomyopathy     COPD (chronic obstructive pulmonary disease)     Diabetes mellitus, type 2     Hyperglycemia     Hyperlipidemia     Hypertension     Malignant neoplasm of overlapping sites of female breast 2/12/2016    Nuclear sclerosis of both eyes 8/12/2020    Respiratory distress 3/12/2020        Allergies:   Review of patient's allergies indicates:   Allergen Reactions    Taxol [paclitaxel]      Hypersensitivity reaction to taxol, symptoms included shortness of breath, nausea, dizziness, flushing     Adhesive Rash     tegaderm burns and blistered skin        Medications:   Current Outpatient Medications   Medication Sig Dispense Refill    albuterol sulfate (PROAIR RESPICLICK) 90 mcg/actuation AePB Inhale 2 puffs into the lungs every 4 (four) hours as needed. Rescue 1 each 5    atorvastatin (LIPITOR) 10 MG tablet Take 1 tablet (10 mg total) by mouth once daily. (Patient taking differently: Take 10 mg by mouth every evening. ) 90 tablet 3    blood sugar diagnostic Strp To check BG 3 times daily, to use with insurance preferred meter 200 each 3    buPROPion (WELLBUTRIN XL) 150 MG TB24 tablet TAKE 1 TABLET BY MOUTH EVERY DAY (Patient taking differently: Take 150 mg by mouth every morning. ) 90 tablet 1    carvediloL (COREG) 25 MG tablet TAKE 1 TABLET(25 MG) BY MOUTH TWICE DAILY 180 tablet 3    cetirizine (ZYRTEC) 10 MG tablet Take 10 mg by mouth every evening.       dexAMETHasone (DECADRON) 4 MG Tab Take 2 tablets (8 mg total) by mouth once daily. Take the 1 day before, 6 hours prior, and on days 2 and 3 of your chemotherapy cycle. for 20 days 40 tablet 0    fluticasone-salmeterol diskus inhaler 250-50 mcg INHALE 1 PUFF INTO THE LUNGS 2 (TWO) TIMES DAILY. (Patient taking differently: INHALE 1 PUFF INTO THE LUNGS 2 (TWO) TIMES DAILY.) 60 each 5    lidocaine-prilocaine (EMLA) cream Apply topically as needed. 30 g 3    metFORMIN  (GLUCOPHAGE) 500 MG tablet Take 1 tablet (500 mg total) by mouth 2 (two) times daily. 180 tablet 1    multivitamin (ONE DAILY MULTIVITAMIN) per tablet Take 1 tablet by mouth once daily.      sertraline (ZOLOFT) 100 MG tablet Take 1 tablet (100 mg total) by mouth every evening. 90 tablet 1    SPIRIVA WITH HANDIHALER 18 mcg inhalation capsule INHALE THE CONTENTS OF 1 CAPSULE EVERY DAY 90 capsule 0    albuterol-ipratropium (DUO-NEB) 2.5 mg-0.5 mg/3 mL nebulizer solution Take 3 mLs by nebulization every 6 (six) hours while awake. Rescue (Patient not taking: Reported on 11/30/2020) 30 mL 5    furosemide (LASIX) 40 MG tablet Take 1 tablet (40 mg total) by mouth once daily. TAKE 1 TABLET(40 MG) BY MOUTH TWICE DAILY (Patient not taking: Reported on 12/7/2020) 90 tablet 1    ondansetron (ZOFRAN-ODT) 8 MG TbDL Take 1 tablet (8 mg total) by mouth every 12 (twelve) hours as needed. (Patient not taking: Reported on 11/30/2020) 30 tablet 1    oxyCODONE (ROXICODONE) 5 MG immediate release tablet Take 1 tablet (5 mg total) by mouth every 4 (four) hours as needed for Pain. (Patient not taking: Reported on 11/9/2020) 30 tablet 0    oxyCODONE-acetaminophen (PERCOCET) 5-325 mg per tablet Take 1 tablet by mouth every 4 (four) hours as needed for Pain. (Patient not taking: Reported on 11/30/2020) 10 tablet 0    prochlorperazine (COMPAZINE) 5 MG tablet Take 1 tablet (5 mg total) by mouth every 6 (six) hours as needed for Nausea. (Patient not taking: Reported on 11/30/2020) 30 tablet 1     No current facility-administered medications for this visit.      Facility-Administered Medications Ordered in Other Visits   Medication Dose Route Frequency Provider Last Rate Last Dose    fentaNYL injection 25 mcg  25 mcg Intravenous Q5 Min PRN Keesha Martins MD        haloperidol lactate injection 0.5 mg  0.5 mg Intravenous Once PRN Keesha Martins MD        HYDROmorphone injection 0.2 mg  0.2 mg Intravenous Q5 Min PRN Keesha Martins MD         "ondansetron injection 4 mg  4 mg Intravenous Once PRN Keesha Martins MD        sodium chloride 0.9% flush 10 mL  10 mL Intravenous PRN Keesha Martins MD            Physical Exam:   BP (!) 156/72 (BP Location: Left arm, Patient Position: Sitting, BP Method: Large (Automatic))   Pulse 100   Temp 98.4 °F (36.9 °C) (Oral)   Resp 16   Ht 5' 2" (1.575 m)   Wt 86.4 kg (190 lb 7.6 oz)   LMP  (LMP Unknown)   SpO2 95%   BMI 34.84 kg/m²      ECOG Performance Status: (foot note - ECOG PS provided by Eastern Co[employer] Oncology Group) 0 - Asymptomatic    Physical Exam  Constitutional:       Appearance: Normal appearance. She is well-developed.   HENT:      Head: Normocephalic and atraumatic.   Eyes:      Conjunctiva/sclera: Conjunctivae normal.      Pupils: Pupils are equal, round, and reactive to light.   Neck:      Musculoskeletal: Normal range of motion and neck supple.   Pulmonary:      Effort: Pulmonary effort is normal. No respiratory distress.   Abdominal:      General: There is no distension.      Palpations: Abdomen is soft.   Musculoskeletal: Normal range of motion.         General: No swelling.   Lymphadenopathy:      Cervical: No cervical adenopathy.   Skin:     General: Skin is warm and dry.   Neurological:      General: No focal deficit present.      Mental Status: She is alert and oriented to person, place, and time.   Psychiatric:         Mood and Affect: Mood normal.         Behavior: Behavior normal.           Labs:   Recent Results (from the past 48 hour(s))   CBC W/ AUTO DIFFERENTIAL    Collection Time: 12/07/20  9:11 AM   Result Value Ref Range    WBC 2.39 (L) 3.90 - 12.70 K/uL    RBC 4.06 4.00 - 5.40 M/uL    Hemoglobin 11.1 (L) 12.0 - 16.0 g/dL    Hematocrit 37.2 37.0 - 48.5 %    MCV 92 82 - 98 fL    MCH 27.3 27.0 - 31.0 pg    MCHC 29.8 (L) 32.0 - 36.0 g/dL    RDW 15.2 (H) 11.5 - 14.5 %    Platelets 135 (L) 150 - 350 K/uL    MPV 11.4 9.2 - 12.9 fL    Immature Granulocytes 0.8 (H) 0.0 - 0.5 %    Gran # " (ANC) 1.6 (L) 1.8 - 7.7 K/uL    Immature Grans (Abs) 0.02 0.00 - 0.04 K/uL    Lymph # 0.5 (L) 1.0 - 4.8 K/uL    Mono # 0.3 0.3 - 1.0 K/uL    Eos # 0.0 0.0 - 0.5 K/uL    Baso # 0.01 0.00 - 0.20 K/uL    nRBC 0 0 /100 WBC    Gran % 67.4 38.0 - 73.0 %    Lymph % 20.1 18.0 - 48.0 %    Mono % 10.5 4.0 - 15.0 %    Eosinophil % 0.8 0.0 - 8.0 %    Basophil % 0.4 0.0 - 1.9 %    Differential Method Automated    COMPREHENSIVE METABOLIC PANEL    Collection Time: 12/07/20  9:11 AM   Result Value Ref Range    Sodium 142 136 - 145 mmol/L    Potassium 4.2 3.5 - 5.1 mmol/L    Chloride 105 95 - 110 mmol/L    CO2 28 23 - 29 mmol/L    Glucose 200 (H) 70 - 110 mg/dL    BUN 9 8 - 23 mg/dL    Creatinine 0.8 0.5 - 1.4 mg/dL    Calcium 8.7 8.7 - 10.5 mg/dL    Total Protein 6.4 6.0 - 8.4 g/dL    Albumin 3.2 (L) 3.5 - 5.2 g/dL    Total Bilirubin 0.4 0.1 - 1.0 mg/dL    Alkaline Phosphatase 48 (L) 55 - 135 U/L    AST 21 10 - 40 U/L    ALT 28 10 - 44 U/L    Anion Gap 9 8 - 16 mmol/L    eGFR if African American >60.0 >60 mL/min/1.73 m^2    eGFR if non African American >60.0 >60 mL/min/1.73 m^2        Imaging:  Cardiac device check - Remote  Additional Comments  REMOTE Interrogation Report    Presenting egram demonstrates:  AS/BiV Paced  Device fxn WNL  RA pacing  0%, V pacing  100%  Atrial arrhythmias:  none  Ventricular arrhythmias:  none  Battery Longevity/Status:  3.4 years  Return to device clinic in  October 2021.    Report prepared by: June B.            Diagnoses:       1. NSCLC of right lung    2. Clinical trial participant          Assessment and Plan:         1. Stage IIIA NSCLC  Plan is for definitive chemoRT, will need re-staging scans prior.  Reviewed with patient in detail her diagnosis, stage, treatment options, and prognosis (3 year OS 66% vs. 43.5% without durvalumab) with standard of care chemoRT followed by maintenance immunotherapy.  Patient has consented for [id number] clinical trial, a randomized control trial evaluating  combination chemoRT + durvalumab followed by maintenance vs. SOC chemoRT -> maintenance.  - patient randomized on [id number] to SOC arm.  Patient has a good performance status ECOG 0 and life expectancy > 12 weeks.  Tolerating treatment well other than paclitaxel reaction, but did not have a reaction since starting pre-medication steroids.  Ok to continue per protocol.     2. 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      3.  Stable, follows with cardiology.  AICD placed, but patient now has recovered EF and only takes lasix prn.  NYHA class I.        The patient agrees with the plan, and all questions have been answered to their satisfaction.      More than 25 mins were spent during this encounter, greater than 50% was spent in direct counseling and/or coordination of care.     Kurtis Viera M.D., M.S., F.A.C.P.  Hematology and Oncology Attending  [facility]  [facility]

## 2020-12-07 NOTE — PROGRESS NOTES
Protocol: Randomized Phase III Trial of [id number] (Durvalumab) as Concurrent and Consolidative Therapy or Consolidative Therapy Alone for Unresectable Stage 3 NSCLC   Protocol # [id number]          IRB # 2020.326  PI: Carmen Akhtar MD  Treating Physician: Javi Avina MD  Study ID #: [id number]  Assigned ARM: ARM B/ Investigator Regimen Option# 3 Concurrent Chemo/RT with Carboplatin and Taxol    December 7, 2020    Cycle 4 MD visit    Patient seen in clinic today for her C4 MD visit. Labs and H&P completed. Con meds and AEs reviewed. She is doing well and states she has much more energy these days ECOG=0. Notes that her hair is starting to fall out. It is not noticeable on observation.     Current AEs    Anxiety- Grade 2- Patient takes Wellbutrin and Zoloft. She is worried about her daughter's health.  Depression- Grade 2- Patient takes Wellbutrin and Zoloft.  Hypertension- Grade 2 (intermittent)-  Patient records her BPs at home and SBPs range 110s-140s and her DBP ranges 60s-80s.. Her BP in clinic today is 156/72. She takes Coreg, Lisinopril, and Lasix PRN. She did not take her AM medications yet this AM.   Hyperglycemia- Grade 2- Patient has history of DM Type II. She takes Metformin for this. (Patient did eat prior to labs today). She has been monitoring her blood glucose at home and it has been ranging     90s -120s fasting per patient.   Diarrhea-(not graded/baseline issue). No changes. Patient has loose watery stools after taking her Metformin. She states that she usually has about 4 loose stools in the AM and 2 loose stools in the PM. Her baseline # of stools is about 6 a day. She controls this with Imodium PRN. Will monitor. Patient educated on s/s to notify physician of.   Anorexia- Grade 2- Her appetite has improved since starting dexamethasone for Taxol infusion prophylaxis. She is eating well and drinks Glucerna. She has been eating less carbs since living with her daughter.   Anemia- Grade 1 (baseline  issue/intermittent) Hgb is 11.1 g/dl on today's labs.   Esophagitis- Grade 1- Improved. States she hasn't noticed it this time.     Labs and AEs are within parameters for treatment. Dosing timeout performed with Dr. Viera. Taxol dosage calculated with screening weight 87.2 kg  (BSA 1.95 m2) per protocol. Carboplatin calculated with screening weight and C4D1 labs. Referred to study chair Dr. Trujillo's protocol clarification email. Orders signed by MD. Patient will return for infusion tomorrow. She has been compliant with her home dexamethasone.

## 2020-12-08 ENCOUNTER — INFUSION (OUTPATIENT)
Dept: INFUSION THERAPY | Facility: HOSPITAL | Age: 65
End: 2020-12-08
Payer: MEDICARE

## 2020-12-08 ENCOUNTER — RESEARCH ENCOUNTER (OUTPATIENT)
Dept: RESEARCH | Facility: HOSPITAL | Age: 65
End: 2020-12-08

## 2020-12-08 ENCOUNTER — DOCUMENTATION ONLY (OUTPATIENT)
Dept: RADIATION ONCOLOGY | Facility: CLINIC | Age: 65
End: 2020-12-08

## 2020-12-08 VITALS
RESPIRATION RATE: 16 BRPM | SYSTOLIC BLOOD PRESSURE: 141 MMHG | DIASTOLIC BLOOD PRESSURE: 65 MMHG | HEART RATE: 82 BPM | BODY MASS INDEX: 35.09 KG/M2 | HEIGHT: 62 IN | WEIGHT: 190.69 LBS | TEMPERATURE: 98 F

## 2020-12-08 DIAGNOSIS — R91.8 LUNG MASS: Primary | ICD-10-CM

## 2020-12-08 PROCEDURE — 63600175 PHARM REV CODE 636 W HCPCS: Mod: HCNC | Performed by: INTERNAL MEDICINE

## 2020-12-08 PROCEDURE — 96367 TX/PROPH/DG ADDL SEQ IV INF: CPT | Mod: HCNC

## 2020-12-08 PROCEDURE — G6002 PR STEREOSCOPIC XRAY GUIDE FOR RADIATION TX DELIV: ICD-10-PCS | Mod: 26,HCNC,, | Performed by: RADIOLOGY

## 2020-12-08 PROCEDURE — 77417 THER RADIOLOGY PORT IMAGE(S): CPT | Mod: HCNC | Performed by: RADIOLOGY

## 2020-12-08 PROCEDURE — 96375 TX/PRO/DX INJ NEW DRUG ADDON: CPT | Mod: HCNC,Q1

## 2020-12-08 PROCEDURE — 77386 HC IMRT, COMPLEX: CPT | Mod: HCNC | Performed by: RADIOLOGY

## 2020-12-08 PROCEDURE — 96417 CHEMO IV INFUS EACH ADDL SEQ: CPT | Mod: HCNC

## 2020-12-08 PROCEDURE — 96413 CHEMO IV INFUSION 1 HR: CPT | Mod: HCNC,Q1

## 2020-12-08 PROCEDURE — 25000003 PHARM REV CODE 250: Mod: HCNC | Performed by: INTERNAL MEDICINE

## 2020-12-08 PROCEDURE — G6002 STEREOSCOPIC X-RAY GUIDANCE: HCPCS | Mod: 26,HCNC,, | Performed by: RADIOLOGY

## 2020-12-08 RX ORDER — EPINEPHRINE 0.3 MG/.3ML
0.3 INJECTION SUBCUTANEOUS ONCE AS NEEDED
Status: DISCONTINUED | OUTPATIENT
Start: 2020-12-08 | End: 2020-12-08 | Stop reason: HOSPADM

## 2020-12-08 RX ORDER — SODIUM CHLORIDE 0.9 % (FLUSH) 0.9 %
10 SYRINGE (ML) INJECTION
Status: DISCONTINUED | OUTPATIENT
Start: 2020-12-08 | End: 2020-12-08 | Stop reason: HOSPADM

## 2020-12-08 RX ORDER — FAMOTIDINE 10 MG/ML
20 INJECTION INTRAVENOUS
Status: COMPLETED | OUTPATIENT
Start: 2020-12-08 | End: 2020-12-08

## 2020-12-08 RX ORDER — DIPHENHYDRAMINE HYDROCHLORIDE 50 MG/ML
50 INJECTION INTRAMUSCULAR; INTRAVENOUS ONCE AS NEEDED
Status: DISCONTINUED | OUTPATIENT
Start: 2020-12-08 | End: 2020-12-08 | Stop reason: HOSPADM

## 2020-12-08 RX ORDER — HEPARIN 100 UNIT/ML
500 SYRINGE INTRAVENOUS
Status: DISCONTINUED | OUTPATIENT
Start: 2020-12-08 | End: 2020-12-08 | Stop reason: HOSPADM

## 2020-12-08 RX ADMIN — APREPITANT 130 MG: 130 INJECTION, EMULSION INTRAVENOUS at 11:12

## 2020-12-08 RX ADMIN — SODIUM CHLORIDE: 0.9 INJECTION, SOLUTION INTRAVENOUS at 10:12

## 2020-12-08 RX ADMIN — HEPARIN 500 UNITS: 100 SYRINGE at 01:12

## 2020-12-08 RX ADMIN — DEXAMETHASONE SODIUM PHOSPHATE 0.25 MG: 4 INJECTION, SOLUTION INTRA-ARTICULAR; INTRALESIONAL; INTRAMUSCULAR; INTRAVENOUS; SOFT TISSUE at 10:12

## 2020-12-08 RX ADMIN — CARBOPLATIN 250 MG: 10 INJECTION INTRAVENOUS at 01:12

## 2020-12-08 RX ADMIN — DIPHENHYDRAMINE HYDROCHLORIDE 50 MG: 50 INJECTION, SOLUTION INTRAMUSCULAR; INTRAVENOUS at 11:12

## 2020-12-08 RX ADMIN — PACLITAXEL 90 MG: 6 INJECTION, SOLUTION, CONCENTRATE INTRAVENOUS at 11:12

## 2020-12-08 RX ADMIN — FAMOTIDINE 20 MG: 10 INJECTION INTRAVENOUS at 10:12

## 2020-12-08 NOTE — PLAN OF CARE
Pt tolerated Taxol and Carbo with no complications. VSS. Pt instructed to call MD with any problems. NAD. Pt discharged home independently.

## 2020-12-08 NOTE — PROGRESS NOTES
Protocol: Randomized Phase III Trial of [id number] (Durvalumab) as Concurrent and Consolidative Therapy or Consolidative Therapy Alone for Unresectable Stage 3 NSCLC   Protocol # [id number]          IRB # 2020.326  PI: Carmen Akhtar MD  Treating Physician: Javi Avina MD  Study ID #: [id number]  Assigned ARM: ARM B/Investigator Regimen Option# 3 Concurrent Chemo/RT with Carboplatin and Taxol    December 8, 2020    Cycle 4 infusion    Patient seen in chemotherapy infusion suite today for C4 of Taxol/Carboplatin. Chemo nurse Joelle Lara RN educated on infusion guidelines per protocol. She was made aware of patient's prior reaction to Taxol at C1. Patient confirmed that she took home dexamethasone as prescribed.     Patient was able to complete Taxol/Carboplatin infusions without any issues.     Patient is to [facility] Monday- Friday for radiation therapy, 12/14/2020 for C4 MD visit/labs, and on 12/15/2020 for cycle 5 of chemotherapy.

## 2020-12-09 PROCEDURE — 77014 HC CT GUIDANCE RADIATION THERAPY FLDS PLACEMENT: CPT | Mod: TC,HCNC | Performed by: RADIOLOGY

## 2020-12-09 PROCEDURE — 77014 PR  CT GUIDANCE PLACEMENT RAD THERAPY FIELDS: CPT | Mod: 26,HCNC,, | Performed by: RADIOLOGY

## 2020-12-09 PROCEDURE — 77386 HC IMRT, COMPLEX: CPT | Mod: HCNC | Performed by: RADIOLOGY

## 2020-12-09 PROCEDURE — 77014 PR  CT GUIDANCE PLACEMENT RAD THERAPY FIELDS: ICD-10-PCS | Mod: 26,HCNC,, | Performed by: RADIOLOGY

## 2020-12-10 PROCEDURE — 77386 HC IMRT, COMPLEX: CPT | Mod: HCNC | Performed by: RADIOLOGY

## 2020-12-10 PROCEDURE — G6002 PR STEREOSCOPIC XRAY GUIDE FOR RADIATION TX DELIV: ICD-10-PCS | Mod: 26,HCNC,, | Performed by: RADIOLOGY

## 2020-12-10 PROCEDURE — G6002 STEREOSCOPIC X-RAY GUIDANCE: HCPCS | Mod: 26,HCNC,, | Performed by: RADIOLOGY

## 2020-12-11 ENCOUNTER — PATIENT MESSAGE (OUTPATIENT)
Dept: OTHER | Facility: OTHER | Age: 65
End: 2020-12-11

## 2020-12-11 DIAGNOSIS — Z00.6 EXAMINATION OF PARTICIPANT IN CLINICAL TRIAL: ICD-10-CM

## 2020-12-11 DIAGNOSIS — C34.91 NSCLC OF RIGHT LUNG: Primary | ICD-10-CM

## 2020-12-11 PROCEDURE — G6002 STEREOSCOPIC X-RAY GUIDANCE: HCPCS | Mod: 26,HCNC,, | Performed by: RADIOLOGY

## 2020-12-11 PROCEDURE — 77386 HC IMRT, COMPLEX: CPT | Mod: HCNC | Performed by: RADIOLOGY

## 2020-12-11 PROCEDURE — G6002 PR STEREOSCOPIC XRAY GUIDE FOR RADIATION TX DELIV: ICD-10-PCS | Mod: 26,HCNC,, | Performed by: RADIOLOGY

## 2020-12-14 ENCOUNTER — RESEARCH ENCOUNTER (OUTPATIENT)
Dept: RESEARCH | Facility: HOSPITAL | Age: 65
End: 2020-12-14

## 2020-12-14 ENCOUNTER — LAB VISIT (OUTPATIENT)
Dept: LAB | Facility: HOSPITAL | Age: 65
End: 2020-12-14
Attending: INTERNAL MEDICINE
Payer: MEDICARE

## 2020-12-14 ENCOUNTER — PATIENT OUTREACH (OUTPATIENT)
Dept: ADMINISTRATIVE | Facility: HOSPITAL | Age: 65
End: 2020-12-14

## 2020-12-14 ENCOUNTER — DOCUMENTATION ONLY (OUTPATIENT)
Dept: HEMATOLOGY/ONCOLOGY | Facility: CLINIC | Age: 65
End: 2020-12-14

## 2020-12-14 ENCOUNTER — OFFICE VISIT (OUTPATIENT)
Dept: HEMATOLOGY/ONCOLOGY | Facility: CLINIC | Age: 65
End: 2020-12-14
Payer: MEDICARE

## 2020-12-14 ENCOUNTER — PATIENT MESSAGE (OUTPATIENT)
Dept: HEMATOLOGY/ONCOLOGY | Facility: CLINIC | Age: 65
End: 2020-12-14

## 2020-12-14 VITALS
SYSTOLIC BLOOD PRESSURE: 121 MMHG | HEART RATE: 77 BPM | WEIGHT: 188.06 LBS | TEMPERATURE: 98 F | DIASTOLIC BLOOD PRESSURE: 67 MMHG | OXYGEN SATURATION: 96 % | HEIGHT: 62 IN | BODY MASS INDEX: 34.61 KG/M2 | RESPIRATION RATE: 20 BRPM

## 2020-12-14 DIAGNOSIS — Z95.810 CARDIAC RESYNCHRONIZATION THERAPY DEFIBRILLATOR (CRT-D) IN PLACE: ICD-10-CM

## 2020-12-14 DIAGNOSIS — I44.7 LEFT BUNDLE-BRANCH BLOCK: ICD-10-CM

## 2020-12-14 DIAGNOSIS — C34.91 NSCLC OF RIGHT LUNG: ICD-10-CM

## 2020-12-14 DIAGNOSIS — T45.1X5A CHEMOTHERAPY-INDUCED THROMBOCYTOPENIA: ICD-10-CM

## 2020-12-14 DIAGNOSIS — D70.1 CHEMOTHERAPY INDUCED NEUTROPENIA: ICD-10-CM

## 2020-12-14 DIAGNOSIS — T45.1X5A ANTINEOPLASTIC CHEMOTHERAPY INDUCED ANEMIA: ICD-10-CM

## 2020-12-14 DIAGNOSIS — E11.9 TYPE 2 DIABETES MELLITUS WITHOUT COMPLICATION, WITHOUT LONG-TERM CURRENT USE OF INSULIN: ICD-10-CM

## 2020-12-14 DIAGNOSIS — I10 ESSENTIAL HYPERTENSION: ICD-10-CM

## 2020-12-14 DIAGNOSIS — Z85.3 HISTORY OF BREAST CANCER IN FEMALE: ICD-10-CM

## 2020-12-14 DIAGNOSIS — Z00.6 CLINICAL TRIAL PARTICIPANT: ICD-10-CM

## 2020-12-14 DIAGNOSIS — Z00.6 EXAMINATION OF PARTICIPANT IN CLINICAL TRIAL: ICD-10-CM

## 2020-12-14 DIAGNOSIS — J41.0 SIMPLE CHRONIC BRONCHITIS: ICD-10-CM

## 2020-12-14 DIAGNOSIS — T45.1X5A CHEMOTHERAPY INDUCED NEUTROPENIA: ICD-10-CM

## 2020-12-14 DIAGNOSIS — D64.81 ANTINEOPLASTIC CHEMOTHERAPY INDUCED ANEMIA: ICD-10-CM

## 2020-12-14 DIAGNOSIS — C34.91 NSCLC OF RIGHT LUNG: Primary | ICD-10-CM

## 2020-12-14 DIAGNOSIS — I42.0 DILATED CARDIOMYOPATHY: ICD-10-CM

## 2020-12-14 DIAGNOSIS — D69.59 CHEMOTHERAPY-INDUCED THROMBOCYTOPENIA: ICD-10-CM

## 2020-12-14 LAB
ALBUMIN SERPL BCP-MCNC: 3.2 G/DL (ref 3.5–5.2)
ALP SERPL-CCNC: 43 U/L (ref 55–135)
ALT SERPL W/O P-5'-P-CCNC: 30 U/L (ref 10–44)
ANION GAP SERPL CALC-SCNC: 8 MMOL/L (ref 8–16)
ANISOCYTOSIS BLD QL SMEAR: SLIGHT
AST SERPL-CCNC: 21 U/L (ref 10–40)
BASOPHILS NFR BLD: 0 % (ref 0–1.9)
BILIRUB SERPL-MCNC: 0.4 MG/DL (ref 0.1–1)
BUN SERPL-MCNC: 12 MG/DL (ref 8–23)
CALCIUM SERPL-MCNC: 8.7 MG/DL (ref 8.7–10.5)
CHLORIDE SERPL-SCNC: 106 MMOL/L (ref 95–110)
CO2 SERPL-SCNC: 27 MMOL/L (ref 23–29)
CREAT SERPL-MCNC: 0.8 MG/DL (ref 0.5–1.4)
DIFFERENTIAL METHOD: ABNORMAL
DRUG STUDY SPECIMEN TYPE: NORMAL
DRUG STUDY TEST NAME: NORMAL
DRUG STUDY TEST RESULT: NORMAL
EOSINOPHIL NFR BLD: 1 % (ref 0–8)
ERYTHROCYTE [DISTWIDTH] IN BLOOD BY AUTOMATED COUNT: 15.7 % (ref 11.5–14.5)
EST. GFR  (AFRICAN AMERICAN): >60 ML/MIN/1.73 M^2
EST. GFR  (NON AFRICAN AMERICAN): >60 ML/MIN/1.73 M^2
GLUCOSE SERPL-MCNC: 175 MG/DL (ref 70–110)
HCT VFR BLD AUTO: 33.8 % (ref 37–48.5)
HGB BLD-MCNC: 10.3 G/DL (ref 12–16)
IMM GRANULOCYTES # BLD AUTO: ABNORMAL K/UL (ref 0–0.04)
IMM GRANULOCYTES NFR BLD AUTO: ABNORMAL % (ref 0–0.5)
LYMPHOCYTES NFR BLD: 15 % (ref 18–48)
MCH RBC QN AUTO: 27.8 PG (ref 27–31)
MCHC RBC AUTO-ENTMCNC: 30.5 G/DL (ref 32–36)
MCV RBC AUTO: 91 FL (ref 82–98)
MONOCYTES NFR BLD: 7 % (ref 4–15)
NEUTROPHILS NFR BLD: 77 % (ref 38–73)
NRBC BLD-RTO: 0 /100 WBC
PLATELET # BLD AUTO: 88 K/UL (ref 150–350)
PLATELET BLD QL SMEAR: ABNORMAL
PMV BLD AUTO: 11.2 FL (ref 9.2–12.9)
POTASSIUM SERPL-SCNC: 4.2 MMOL/L (ref 3.5–5.1)
PROT SERPL-MCNC: 6.1 G/DL (ref 6–8.4)
RBC # BLD AUTO: 3.7 M/UL (ref 4–5.4)
SODIUM SERPL-SCNC: 141 MMOL/L (ref 136–145)
WBC # BLD AUTO: 1.53 K/UL (ref 3.9–12.7)

## 2020-12-14 PROCEDURE — 77386 HC IMRT, COMPLEX: CPT | Mod: HCNC | Performed by: RADIOLOGY

## 2020-12-14 PROCEDURE — 99999 PR PBB SHADOW E&M-EST. PATIENT-LVL V: CPT | Mod: PBBFAC,HCNC,, | Performed by: INTERNAL MEDICINE

## 2020-12-14 PROCEDURE — 3078F DIAST BP <80 MM HG: CPT | Mod: HCNC,CPTII,S$GLB, | Performed by: INTERNAL MEDICINE

## 2020-12-14 PROCEDURE — 3074F PR MOST RECENT SYSTOLIC BLOOD PRESSURE < 130 MM HG: ICD-10-PCS | Mod: HCNC,CPTII,S$GLB, | Performed by: INTERNAL MEDICINE

## 2020-12-14 PROCEDURE — 99215 PR OFFICE/OUTPT VISIT, EST, LEVL V, 40-54 MIN: ICD-10-PCS | Mod: HCNC,S$GLB,, | Performed by: INTERNAL MEDICINE

## 2020-12-14 PROCEDURE — G6002 PR STEREOSCOPIC XRAY GUIDE FOR RADIATION TX DELIV: ICD-10-PCS | Mod: 26,HCNC,, | Performed by: RADIOLOGY

## 2020-12-14 PROCEDURE — 3008F BODY MASS INDEX DOCD: CPT | Mod: HCNC,CPTII,S$GLB, | Performed by: INTERNAL MEDICINE

## 2020-12-14 PROCEDURE — 99000 SPECIMEN HANDLING OFFICE-LAB: CPT | Mod: HCNC

## 2020-12-14 PROCEDURE — 85027 COMPLETE CBC AUTOMATED: CPT | Mod: HCNC

## 2020-12-14 PROCEDURE — 1126F PR PAIN SEVERITY QUANTIFIED, NO PAIN PRESENT: ICD-10-PCS | Mod: HCNC,S$GLB,, | Performed by: INTERNAL MEDICINE

## 2020-12-14 PROCEDURE — 99999 PR PBB SHADOW E&M-EST. PATIENT-LVL V: ICD-10-PCS | Mod: PBBFAC,HCNC,, | Performed by: INTERNAL MEDICINE

## 2020-12-14 PROCEDURE — 1126F AMNT PAIN NOTED NONE PRSNT: CPT | Mod: HCNC,S$GLB,, | Performed by: INTERNAL MEDICINE

## 2020-12-14 PROCEDURE — 3051F HG A1C>EQUAL 7.0%<8.0%: CPT | Mod: HCNC,CPTII,S$GLB, | Performed by: INTERNAL MEDICINE

## 2020-12-14 PROCEDURE — 3051F PR MOST RECENT HEMOGLOBIN A1C LEVEL 7.0 - < 8.0%: ICD-10-PCS | Mod: HCNC,CPTII,S$GLB, | Performed by: INTERNAL MEDICINE

## 2020-12-14 PROCEDURE — 3078F PR MOST RECENT DIASTOLIC BLOOD PRESSURE < 80 MM HG: ICD-10-PCS | Mod: HCNC,CPTII,S$GLB, | Performed by: INTERNAL MEDICINE

## 2020-12-14 PROCEDURE — 99499 UNLISTED E&M SERVICE: CPT | Mod: HCNC,S$GLB,, | Performed by: INTERNAL MEDICINE

## 2020-12-14 PROCEDURE — 3074F SYST BP LT 130 MM HG: CPT | Mod: HCNC,CPTII,S$GLB, | Performed by: INTERNAL MEDICINE

## 2020-12-14 PROCEDURE — 80053 COMPREHEN METABOLIC PANEL: CPT | Mod: HCNC

## 2020-12-14 PROCEDURE — 99215 OFFICE O/P EST HI 40 MIN: CPT | Mod: HCNC,S$GLB,, | Performed by: INTERNAL MEDICINE

## 2020-12-14 PROCEDURE — G6002 STEREOSCOPIC X-RAY GUIDANCE: HCPCS | Mod: 26,HCNC,, | Performed by: RADIOLOGY

## 2020-12-14 PROCEDURE — 85007 BL SMEAR W/DIFF WBC COUNT: CPT | Mod: HCNC

## 2020-12-14 PROCEDURE — 36415 COLL VENOUS BLD VENIPUNCTURE: CPT | Mod: HCNC,Q1

## 2020-12-14 PROCEDURE — 99499 RISK ADDL DX/OHS AUDIT: ICD-10-PCS | Mod: HCNC,S$GLB,, | Performed by: INTERNAL MEDICINE

## 2020-12-14 PROCEDURE — 3008F PR BODY MASS INDEX (BMI) DOCUMENTED: ICD-10-PCS | Mod: HCNC,CPTII,S$GLB, | Performed by: INTERNAL MEDICINE

## 2020-12-14 RX ORDER — HEPARIN 100 UNIT/ML
500 SYRINGE INTRAVENOUS
Status: CANCELLED | OUTPATIENT
Start: 2020-12-15

## 2020-12-14 RX ORDER — DIPHENHYDRAMINE HYDROCHLORIDE 50 MG/ML
50 INJECTION INTRAMUSCULAR; INTRAVENOUS ONCE AS NEEDED
Status: CANCELLED | OUTPATIENT
Start: 2020-12-15

## 2020-12-14 RX ORDER — EPINEPHRINE 0.3 MG/.3ML
0.3 INJECTION SUBCUTANEOUS ONCE AS NEEDED
Status: CANCELLED | OUTPATIENT
Start: 2020-12-15

## 2020-12-14 RX ORDER — FAMOTIDINE 10 MG/ML
20 INJECTION INTRAVENOUS
Status: CANCELLED | OUTPATIENT
Start: 2020-12-15

## 2020-12-14 RX ORDER — SODIUM CHLORIDE 0.9 % (FLUSH) 0.9 %
10 SYRINGE (ML) INJECTION
Status: CANCELLED | OUTPATIENT
Start: 2020-12-15

## 2020-12-14 NOTE — PROGRESS NOTES
ONCOLOGY FOLLOW UP VISIT.     Reason for visit: Toxicity check on SOC chemoradiation on [id number] clinical trial for stage IIIA NSCLC    Best Contact Phone Number(s): [phone] (home)      Cancer/Stage/TNM:   Cancer Staging  NSCLC of right lung  Staging form: Lung, AJCC 8th Edition  - Clinical stage from 9/29/2020: Stage IIIA (cT3, cN1, cM0) - Signed by Ramo Tucker MD on 10/24/2020       Oncology History   NSCLC of right lung   9/29/2020 Cancer Staged    Staging form: Lung, AJCC 8th Edition  - Clinical stage from 9/29/2020: Stage IIIA (cT3, cN1, cM0)     10/14/2020 Initial Diagnosis    NSCLC of right lung          Interval History:   Today patient reports no new symptoms.  She has some SINGH since surgery, but improves with rescue inhaler.     ROS:  Review of Systems   Constitutional: Negative for activity change, appetite change, chills, fatigue, fever and unexpected weight change.   HENT: Negative for mouth sores, nosebleeds and sore throat.    Eyes: Negative for visual disturbance.   Respiratory: Negative for cough, shortness of breath and wheezing.    Cardiovascular: Negative for chest pain, palpitations and leg swelling.   Gastrointestinal: Negative for abdominal distention, abdominal pain, blood in stool and diarrhea.   Endocrine: Negative for cold intolerance and heat intolerance.   Genitourinary: Positive for frequency. Negative for dysuria and flank pain.   Musculoskeletal: Negative for arthralgias, back pain, joint swelling and myalgias.   Skin: Negative for color change, rash and wound.   Neurological: Negative for dizziness, light-headedness and headaches.   Hematological: Negative for adenopathy. Does not bruise/bleed easily.   Psychiatric/Behavioral: The patient is not nervous/anxious.       A complete 12-point review of systems was reviewed and is negative except as mentioned above.     Past Medical History:   Past Medical History:   Diagnosis Date    AICD (automatic cardioverter/defibrillator)  present     Asthma     bronchitis in past    Breast cancer 2016    right    Cardiac pacemaker     Cardiomyopathy     COPD (chronic obstructive pulmonary disease)     Diabetes mellitus, type 2     Hyperglycemia     Hyperlipidemia     Hypertension     Malignant neoplasm of overlapping sites of female breast 2/12/2016    Nuclear sclerosis of both eyes 8/12/2020    Respiratory distress 3/12/2020        Allergies:   Review of patient's allergies indicates:   Allergen Reactions    Taxol [paclitaxel]      Hypersensitivity reaction to taxol, symptoms included shortness of breath, nausea, dizziness, flushing     Adhesive Rash     tegaderm burns and blistered skin        Medications:   Current Outpatient Medications   Medication Sig Dispense Refill    albuterol sulfate (PROAIR RESPICLICK) 90 mcg/actuation AePB Inhale 2 puffs into the lungs every 4 (four) hours as needed. Rescue 1 each 5    atorvastatin (LIPITOR) 10 MG tablet Take 1 tablet (10 mg total) by mouth once daily. (Patient taking differently: Take 10 mg by mouth every evening. ) 90 tablet 3    blood sugar diagnostic Strp To check BG 3 times daily, to use with insurance preferred meter 200 each 3    carvediloL (COREG) 25 MG tablet TAKE 1 TABLET(25 MG) BY MOUTH TWICE DAILY 180 tablet 3    cetirizine (ZYRTEC) 10 MG tablet Take 10 mg by mouth every evening.       lidocaine-prilocaine (EMLA) cream Apply topically as needed. 30 g 3    metFORMIN (GLUCOPHAGE) 500 MG tablet TAKE 1 TABLET TWICE DAILY 180 tablet 1    multivitamin (ONE DAILY MULTIVITAMIN) per tablet Take 1 tablet by mouth once daily.      sertraline (ZOLOFT) 100 MG tablet TAKE 1 TABLET EVERY EVENING 90 tablet 1    SPIRIVA WITH HANDIHALER 18 mcg inhalation capsule INHALE THE CONTENTS OF 1 CAPSULE EVERY DAY 90 capsule 0    WIXELA INHUB 250-50 mcg/dose diskus inhaler INHALE 1 PUFF INTO THE LUNGS 2 (TWO) TIMES DAILY. 180 each 1    albuterol-ipratropium (DUO-NEB) 2.5 mg-0.5 mg/3 mL nebulizer  "solution Take 3 mLs by nebulization every 6 (six) hours while awake. Rescue (Patient not taking: Reported on 11/30/2020) 30 mL 5    buPROPion (WELLBUTRIN XL) 150 MG TB24 tablet TAKE 1 TABLET BY MOUTH EVERY DAY (Patient not taking: No sig reported) 90 tablet 1    furosemide (LASIX) 40 MG tablet Take 1 tablet (40 mg total) by mouth once daily. TAKE 1 TABLET(40 MG) BY MOUTH TWICE DAILY (Patient not taking: Reported on 12/7/2020) 90 tablet 1    ondansetron (ZOFRAN-ODT) 8 MG TbDL Take 1 tablet (8 mg total) by mouth every 12 (twelve) hours as needed. (Patient not taking: Reported on 11/30/2020) 30 tablet 1    oxyCODONE (ROXICODONE) 5 MG immediate release tablet Take 1 tablet (5 mg total) by mouth every 4 (four) hours as needed for Pain. (Patient not taking: Reported on 11/9/2020) 30 tablet 0    oxyCODONE-acetaminophen (PERCOCET) 5-325 mg per tablet Take 1 tablet by mouth every 4 (four) hours as needed for Pain. (Patient not taking: Reported on 11/30/2020) 10 tablet 0    prochlorperazine (COMPAZINE) 5 MG tablet Take 1 tablet (5 mg total) by mouth every 6 (six) hours as needed for Nausea. (Patient not taking: Reported on 11/30/2020) 30 tablet 1     No current facility-administered medications for this visit.      Facility-Administered Medications Ordered in Other Visits   Medication Dose Route Frequency Provider Last Rate Last Dose    fentaNYL injection 25 mcg  25 mcg Intravenous Q5 Min PRN Keesha Martins MD        haloperidol lactate injection 0.5 mg  0.5 mg Intravenous Once PRN Keesha Martins MD        HYDROmorphone injection 0.2 mg  0.2 mg Intravenous Q5 Min PRN Keesha Martins MD        ondansetron injection 4 mg  4 mg Intravenous Once PRN Keesha Martins MD        sodium chloride 0.9% flush 10 mL  10 mL Intravenous PRN Keesha Martins MD            Physical Exam:   /67 (BP Location: Left arm, Patient Position: Sitting, BP Method: Medium (Automatic))   Pulse 77   Temp 98 °F (36.7 °C) (Oral)   Resp 20   Ht 5' 2" (1.575 m)  "  Wt 85.3 kg (188 lb 0.8 oz)   LMP  (LMP Unknown)   SpO2 96%   BMI 34.40 kg/m²      ECOG Performance Status: (foot note - ECOG PS provided by Eastern Cooperative Oncology Group) 0 - Asymptomatic    Physical Exam  Constitutional:       Appearance: Normal appearance. She is well-developed.   HENT:      Head: Normocephalic and atraumatic.   Eyes:      Conjunctiva/sclera: Conjunctivae normal.      Pupils: Pupils are equal, round, and reactive to light.   Neck:      Musculoskeletal: Normal range of motion and neck supple.   Pulmonary:      Effort: Pulmonary effort is normal. No respiratory distress.   Abdominal:      General: There is no distension.      Palpations: Abdomen is soft.   Musculoskeletal: Normal range of motion.         General: No swelling.   Lymphadenopathy:      Cervical: No cervical adenopathy.   Skin:     General: Skin is warm and dry.   Neurological:      General: No focal deficit present.      Mental Status: She is alert and oriented to person, place, and time.   Psychiatric:         Mood and Affect: Mood normal.         Behavior: Behavior normal.           Labs:   Recent Results (from the past 48 hour(s))   CBC W/ AUTO DIFFERENTIAL    Collection Time: 12/14/20 10:32 AM   Result Value Ref Range    WBC 1.53 (LL) 3.90 - 12.70 K/uL    RBC 3.70 (L) 4.00 - 5.40 M/uL    Hemoglobin 10.3 (L) 12.0 - 16.0 g/dL    Hematocrit 33.8 (L) 37.0 - 48.5 %    MCV 91 82 - 98 fL    MCH 27.8 27.0 - 31.0 pg    MCHC 30.5 (L) 32.0 - 36.0 g/dL    RDW 15.7 (H) 11.5 - 14.5 %    Platelets 88 (L) 150 - 350 K/uL    MPV 11.2 9.2 - 12.9 fL    Immature Granulocytes CANCELED 0.0 - 0.5 %    Immature Grans (Abs) CANCELED 0.00 - 0.04 K/uL    nRBC 0 0 /100 WBC    Gran % 77.0 (H) 38.0 - 73.0 %    Lymph % 15.0 (L) 18.0 - 48.0 %    Mono % 7.0 4.0 - 15.0 %    Eosinophil % 1.0 0.0 - 8.0 %    Basophil % 0.0 0.0 - 1.9 %    Platelet Estimate Decreased (A)     Aniso Slight     Differential Method Manual    COMPREHENSIVE METABOLIC PANEL     Collection Time: 12/14/20 10:32 AM   Result Value Ref Range    Sodium 141 136 - 145 mmol/L    Potassium 4.2 3.5 - 5.1 mmol/L    Chloride 106 95 - 110 mmol/L    CO2 27 23 - 29 mmol/L    Glucose 175 (H) 70 - 110 mg/dL    BUN 12 8 - 23 mg/dL    Creatinine 0.8 0.5 - 1.4 mg/dL    Calcium 8.7 8.7 - 10.5 mg/dL    Total Protein 6.1 6.0 - 8.4 g/dL    Albumin 3.2 (L) 3.5 - 5.2 g/dL    Total Bilirubin 0.4 0.1 - 1.0 mg/dL    Alkaline Phosphatase 43 (L) 55 - 135 U/L    AST 21 10 - 40 U/L    ALT 30 10 - 44 U/L    Anion Gap 8 8 - 16 mmol/L    eGFR if African American >60.0 >60 mL/min/1.73 m^2    eGFR if non African American >60.0 >60 mL/min/1.73 m^2   DRUG STUDY SENDOUT, [facility]    Collection Time: 12/14/20 10:32 AM   Result Value Ref Range    Drug Study Test Name Drug Study     Drug Study Specimen Type blood     Drug Study Test Result Result sent directly to physician         Imaging:  Cardiac device check - Remote  Additional Comments  REMOTE Interrogation Report    Presenting egram demonstrates:  AS/BiV Paced  Device fxn WNL  RA pacing  0%, V pacing  100%  Atrial arrhythmias:  none  Ventricular arrhythmias:  none  Battery Longevity/Status:  3.4 years  Return to device clinic in  October 2021.    Report prepared by: June B.            Diagnoses:       1. NSCLC of right lung    2. Clinical trial participant    3. History of breast cancer in female    4. Dilated cardiomyopathy    5. Left bundle-branch block    6. Cardiac resynchronization therapy defibrillator (CRT-D) in place    7. Essential hypertension    8. Simple chronic bronchitis    9. Type 2 diabetes mellitus without complication, without long-term current use of insulin    10. Chemotherapy induced neutropenia    11. Antineoplastic chemotherapy induced anemia    12. Chemotherapy-induced thrombocytopenia          Assessment and Plan:         1,2. Stage IIIA NSCLC  Plan is for definitive chemoRT, will need re-staging scans prior.  Reviewed with patient in detail her diagnosis,  stage, treatment options, and prognosis (3 year OS 66% vs. 43.5% without durvalumab) with standard of care chemoRT followed by maintenance immunotherapy.  Patient has consented for [id number] clinical trial, a randomized control trial evaluating combination chemoRT + durvalumab followed by maintenance vs. SOC chemoRT -> maintenance.  - patient randomized on [id number] to SOC arm.  Patient has a good performance status ECOG 0 and life expectancy > 12 weeks.  Tolerating treatment well other than paclitaxel reaction, but did not have a reaction since starting pre-medication steroids.  Ok to continue per protocol.     3. 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      4,5,6 Stable, follows with cardiology.  AICD placed, but patient now has recovered EF and only takes lasix prn.  NYHA class I.     7 Controlled continue to follow with PCP and cardiologist.     8 Controlled on inhalers     9 Last HbA1c 7, controlled    10 Grade 2, no dose modifications or treatment pause per protocol    11,12 No indication for transfusion.            Greater than 50% of this time involved counseling or coordination of care. I have provided the patient with an opportunity to ask questions and have all questions answered to his satisfaction.     she will return to clinic per protocol, but knows to call in the interim if symptoms change or should a problem arise.    Javi Avina MD  Medical Oncology   [facility]  [facility]

## 2020-12-14 NOTE — PROGRESS NOTES
Protocol: Randomized Phase III Trial of [id number] (Durvalumab) as Concurrent and Consolidative Therapy or Consolidative Therapy Alone for Unresectable Stage 3 NSCLC   Protocol # [id number]          IRB # 2020.326  PI: Carmen Akhtar MD  Treating Physician: Javi Avina MD  Study ID #: [id number]  Assigned ARM: ARM B/ Investigator Regimen Option# 3 Concurrent Chemo/RT with Carboplatin and Taxol    December 14, 2020    Step 1 Week 5 CRT sendouts    Optional blood specimens collected prior to treatment today per protocol. Specimens shipped to [facility] via [email or web address] Tracking number is [id number].

## 2020-12-14 NOTE — PROGRESS NOTES
"Protocol: Randomized Phase III Trial of [id number] (Durvalumab) as Concurrent and Consolidative Therapy or Consolidative Therapy Alone for Unresectable Stage 3 NSCLC   Protocol # [id number]          IRB # 2020.326  PI: Carmen Akhtar MD  Treating Physician: Javi Avina MD  Study ID #: [id number]  Assigned ARM: ARM B/ Investigator Regimen Option# 3 Concurrent Chemo/RT with Carboplatin and Taxol    December 14, 2020    Cycle 5 MD visit    Patient seen in clinic today for her C5 MD visit. Labs and H&P completed. Con meds and AEs reviewed. She is doing well. ECOG=0. Notes that her hair is starting to fall out more and her daughter will be giving her a "buzz cut" later today. She denies any other complaints. Her blood counts are decreased today. She denies any fevers or abnormal bleeding/bruising.    Current AEs    Anxiety- Grade 2- Patient takes Wellbutrin and Zoloft. She is worried about her daughter's health.  Depression- Grade 2- Patient takes Wellbutrin and Zoloft.  Hypertension- Grade 2 (intermittent)-  Patient records her BPs at home and SBPs range 110s-140s and her DBP ranges 60s-80s.. Her BP in clinic today is 121/67. She takes Coreg, Lisinopril, and Lasix PRN. She took her AM medications this AM.   Hyperglycemia- Grade 2- Patient has history of DM Type II. She takes Metformin for this. (Patient did eat prior to labs today). She has been monitoring her blood glucose at home and it has been ranging     90s -120s fasting per patient.   Diarrhea-(not graded/baseline issue). No changes. Patient has loose watery stools after taking her Metformin. She states that she usually has about 4 loose stools in the AM and 2 loose stools in the PM. Her baseline # of stools is about 6 a day. She controls this with Imodium PRN. Will monitor. Patient educated on s/s to notify physician of.   Anorexia- Grade 2- Her appetite has improved since starting dexamethasone for Taxol infusion prophylaxis. She is eating well and drinks Glucerna. "   Anemia- Grade 1 (baseline issue/intermittent) Hgb is 10.3 g/dl on today's labs.   Esophagitis- Grade 1- Improved. She states that it is a rare occurrence and is relieved by Tums.    Neutrophil count decreased- Grade 2- Reviewed dose mod sections 5.4.3 and 5.4.5 with Dr. Avina. Patient educated on neutropenic precautions.  White blood cell decreased- Grade 3-Reviewed dose mod sections 5.4.3 and 5.4.5 with Dr. Avina. Patient educated on neutropenic precautions.  Platelet count decreased- Grade 1-Reviewed dose mod sections 5.4.3 and 5.4.5 with Dr. Avina. Patient educated on bleeding precautions.  Alopecia- Grade 1- Hair loss of <50% of normal for that individual that is not obvious from a distance but only on close inspection.      Reviewed dose mod sections 5.4.3 and 5.4.5 with Dr. Avina. Labs and AEs are acceptable for treatment per Dr. Avina. Dosing timeout performed with Dr. Avina. Taxol dosage calculated with screening weight 87.2 kg  (BSA 1.95 m2) per protocol. Carboplatin calculated with screening weight and C5D1 labs. Referred to study chair Dr. Trujillo's protocol clarification email. Orders signed by MD. Patient will return for infusion tomorrow. She has been compliant with her home dexamethasone.

## 2020-12-15 ENCOUNTER — INFUSION (OUTPATIENT)
Dept: INFUSION THERAPY | Facility: HOSPITAL | Age: 65
End: 2020-12-15
Payer: MEDICARE

## 2020-12-15 ENCOUNTER — RESEARCH ENCOUNTER (OUTPATIENT)
Dept: RESEARCH | Facility: HOSPITAL | Age: 65
End: 2020-12-15

## 2020-12-15 VITALS
RESPIRATION RATE: 18 BRPM | WEIGHT: 188.06 LBS | HEART RATE: 88 BPM | TEMPERATURE: 98 F | HEIGHT: 62 IN | SYSTOLIC BLOOD PRESSURE: 129 MMHG | BODY MASS INDEX: 34.61 KG/M2 | DIASTOLIC BLOOD PRESSURE: 72 MMHG

## 2020-12-15 DIAGNOSIS — R91.8 LUNG MASS: Primary | ICD-10-CM

## 2020-12-15 PROCEDURE — 96413 CHEMO IV INFUSION 1 HR: CPT | Mod: HCNC,Q1

## 2020-12-15 PROCEDURE — G6002 STEREOSCOPIC X-RAY GUIDANCE: HCPCS | Mod: 26,HCNC,, | Performed by: RADIOLOGY

## 2020-12-15 PROCEDURE — 63600175 PHARM REV CODE 636 W HCPCS: Mod: HCNC | Performed by: INTERNAL MEDICINE

## 2020-12-15 PROCEDURE — 77336 RADIATION PHYSICS CONSULT: CPT | Mod: HCNC | Performed by: RADIOLOGY

## 2020-12-15 PROCEDURE — 77417 THER RADIOLOGY PORT IMAGE(S): CPT | Mod: HCNC | Performed by: RADIOLOGY

## 2020-12-15 PROCEDURE — A4216 STERILE WATER/SALINE, 10 ML: HCPCS | Mod: HCNC,Q1 | Performed by: INTERNAL MEDICINE

## 2020-12-15 PROCEDURE — 25000003 PHARM REV CODE 250: Mod: HCNC | Performed by: INTERNAL MEDICINE

## 2020-12-15 PROCEDURE — 77386 HC IMRT, COMPLEX: CPT | Mod: HCNC | Performed by: RADIOLOGY

## 2020-12-15 PROCEDURE — G6002 PR STEREOSCOPIC XRAY GUIDE FOR RADIATION TX DELIV: ICD-10-PCS | Mod: 26,HCNC,, | Performed by: RADIOLOGY

## 2020-12-15 PROCEDURE — 96376 TX/PRO/DX INJ SAME DRUG ADON: CPT | Mod: HCNC

## 2020-12-15 PROCEDURE — 96367 TX/PROPH/DG ADDL SEQ IV INF: CPT | Mod: HCNC,Q1

## 2020-12-15 PROCEDURE — 96417 CHEMO IV INFUS EACH ADDL SEQ: CPT | Mod: HCNC,Q1

## 2020-12-15 RX ORDER — EPINEPHRINE 0.3 MG/.3ML
0.3 INJECTION SUBCUTANEOUS ONCE AS NEEDED
Status: DISCONTINUED | OUTPATIENT
Start: 2020-12-15 | End: 2020-12-15 | Stop reason: HOSPADM

## 2020-12-15 RX ORDER — SODIUM CHLORIDE 0.9 % (FLUSH) 0.9 %
10 SYRINGE (ML) INJECTION
Status: DISCONTINUED | OUTPATIENT
Start: 2020-12-15 | End: 2020-12-15 | Stop reason: HOSPADM

## 2020-12-15 RX ORDER — HEPARIN 100 UNIT/ML
500 SYRINGE INTRAVENOUS
Status: DISCONTINUED | OUTPATIENT
Start: 2020-12-15 | End: 2020-12-15 | Stop reason: HOSPADM

## 2020-12-15 RX ORDER — FAMOTIDINE 10 MG/ML
20 INJECTION INTRAVENOUS
Status: COMPLETED | OUTPATIENT
Start: 2020-12-15 | End: 2020-12-15

## 2020-12-15 RX ORDER — DIPHENHYDRAMINE HYDROCHLORIDE 50 MG/ML
50 INJECTION INTRAMUSCULAR; INTRAVENOUS ONCE AS NEEDED
Status: DISCONTINUED | OUTPATIENT
Start: 2020-12-15 | End: 2020-12-15 | Stop reason: HOSPADM

## 2020-12-15 RX ADMIN — PACLITAXEL 90 MG: 6 INJECTION, SOLUTION, CONCENTRATE INTRAVENOUS at 11:12

## 2020-12-15 RX ADMIN — SODIUM CHLORIDE: 9 INJECTION, SOLUTION INTRAVENOUS at 10:12

## 2020-12-15 RX ADMIN — APREPITANT 130 MG: 130 INJECTION, EMULSION INTRAVENOUS at 11:12

## 2020-12-15 RX ADMIN — DIPHENHYDRAMINE HYDROCHLORIDE 50 MG: 50 INJECTION, SOLUTION INTRAMUSCULAR; INTRAVENOUS at 10:12

## 2020-12-15 RX ADMIN — Medication 10 ML: at 01:12

## 2020-12-15 RX ADMIN — CARBOPLATIN 250 MG: 10 INJECTION INTRAVENOUS at 12:12

## 2020-12-15 RX ADMIN — HEPARIN 500 UNITS: 100 SYRINGE at 01:12

## 2020-12-15 RX ADMIN — FAMOTIDINE 20 MG: 10 INJECTION INTRAVENOUS at 11:12

## 2020-12-15 RX ADMIN — DEXAMETHASONE SODIUM PHOSPHATE 0.25 MG: 4 INJECTION, SOLUTION INTRA-ARTICULAR; INTRALESIONAL; INTRAMUSCULAR; INTRAVENOUS; SOFT TISSUE at 10:12

## 2020-12-15 NOTE — PROGRESS NOTES
Protocol: Randomized Phase III Trial of [id number] (Durvalumab) as Concurrent and Consolidative Therapy or Consolidative Therapy Alone for Unresectable Stage 3 NSCLC   Protocol # [id number]          IRB # 2020.326  PI: Carmen Akhtar MD  Treating Physician: Javi Avina MD  Study ID #: [id number]  Assigned ARM: ARM B/Investigator Regimen Option# 3 Concurrent Chemo/RT with Carboplatin and Taxol    December 15, 2020    Cycle 5 infusion    Patient seen in chemotherapy infusion suite today for C5 of Taxol/Carboplatin. Chemo nurse Viktoriya Avila RN educated on infusion guidelines per protocol. She was made aware of patient's prior reaction to Taxol at C1. Patient confirmed that she took home dexamethasone as prescribed.     Patient was able to complete Taxol/Carboplatin infusions without any issues.     Patient is to RTC Monday- Friday for radiation therapy, 12/21/2020 for C6 MD visit/labs, and on 12/22/2020 for cycle 6 of chemotherapy.

## 2020-12-15 NOTE — PLAN OF CARE
Tolerated taxol/carbo well.  No reactions noted.  No questions or concerns at this time.  Ambulated off unit in NAD.

## 2020-12-16 ENCOUNTER — DOCUMENTATION ONLY (OUTPATIENT)
Dept: RADIATION ONCOLOGY | Facility: CLINIC | Age: 65
End: 2020-12-16

## 2020-12-16 PROCEDURE — 77014 PR  CT GUIDANCE PLACEMENT RAD THERAPY FIELDS: ICD-10-PCS | Mod: 26,HCNC,, | Performed by: RADIOLOGY

## 2020-12-16 PROCEDURE — 77386 HC IMRT, COMPLEX: CPT | Mod: HCNC | Performed by: RADIOLOGY

## 2020-12-16 PROCEDURE — 77014 HC CT GUIDANCE RADIATION THERAPY FLDS PLACEMENT: CPT | Mod: TC,HCNC | Performed by: RADIOLOGY

## 2020-12-16 PROCEDURE — 77014 PR  CT GUIDANCE PLACEMENT RAD THERAPY FIELDS: CPT | Mod: 26,HCNC,, | Performed by: RADIOLOGY

## 2020-12-17 DIAGNOSIS — C34.91 NSCLC OF RIGHT LUNG: Primary | ICD-10-CM

## 2020-12-17 DIAGNOSIS — Z00.6 EXAMINATION OF PARTICIPANT IN CLINICAL TRIAL: ICD-10-CM

## 2020-12-17 DIAGNOSIS — R68.89 OTHER GENERAL SYMPTOMS AND SIGNS: ICD-10-CM

## 2020-12-17 PROCEDURE — 77386 HC IMRT, COMPLEX: CPT | Mod: HCNC | Performed by: RADIOLOGY

## 2020-12-17 PROCEDURE — G6002 STEREOSCOPIC X-RAY GUIDANCE: HCPCS | Mod: 26,HCNC,, | Performed by: RADIOLOGY

## 2020-12-17 PROCEDURE — G6002 PR STEREOSCOPIC XRAY GUIDE FOR RADIATION TX DELIV: ICD-10-PCS | Mod: 26,HCNC,, | Performed by: RADIOLOGY

## 2020-12-18 PROCEDURE — G6002 STEREOSCOPIC X-RAY GUIDANCE: HCPCS | Mod: 26,HCNC,, | Performed by: RADIOLOGY

## 2020-12-18 PROCEDURE — 77386 HC IMRT, COMPLEX: CPT | Mod: HCNC | Performed by: RADIOLOGY

## 2020-12-18 PROCEDURE — G6002 PR STEREOSCOPIC XRAY GUIDE FOR RADIATION TX DELIV: ICD-10-PCS | Mod: 26,HCNC,, | Performed by: RADIOLOGY

## 2020-12-20 ENCOUNTER — PATIENT MESSAGE (OUTPATIENT)
Dept: ADMINISTRATIVE | Facility: OTHER | Age: 65
End: 2020-12-20

## 2020-12-21 ENCOUNTER — RESEARCH ENCOUNTER (OUTPATIENT)
Dept: RESEARCH | Facility: HOSPITAL | Age: 65
End: 2020-12-21

## 2020-12-21 ENCOUNTER — TELEPHONE (OUTPATIENT)
Dept: HEMATOLOGY/ONCOLOGY | Facility: CLINIC | Age: 65
End: 2020-12-21

## 2020-12-21 ENCOUNTER — OFFICE VISIT (OUTPATIENT)
Dept: HEMATOLOGY/ONCOLOGY | Facility: CLINIC | Age: 65
End: 2020-12-21
Payer: MEDICARE

## 2020-12-21 ENCOUNTER — LAB VISIT (OUTPATIENT)
Dept: LAB | Facility: HOSPITAL | Age: 65
End: 2020-12-21
Attending: INTERNAL MEDICINE
Payer: MEDICARE

## 2020-12-21 VITALS
DIASTOLIC BLOOD PRESSURE: 66 MMHG | OXYGEN SATURATION: 96 % | RESPIRATION RATE: 16 BRPM | SYSTOLIC BLOOD PRESSURE: 130 MMHG | TEMPERATURE: 99 F | HEART RATE: 94 BPM | BODY MASS INDEX: 34.69 KG/M2 | HEIGHT: 62 IN | WEIGHT: 188.5 LBS

## 2020-12-21 DIAGNOSIS — C34.91 NSCLC OF RIGHT LUNG: Primary | ICD-10-CM

## 2020-12-21 DIAGNOSIS — D64.81 ANTINEOPLASTIC CHEMOTHERAPY INDUCED ANEMIA: ICD-10-CM

## 2020-12-21 DIAGNOSIS — C34.91 NSCLC OF RIGHT LUNG: ICD-10-CM

## 2020-12-21 DIAGNOSIS — I42.0 DILATED CARDIOMYOPATHY: ICD-10-CM

## 2020-12-21 DIAGNOSIS — D70.1 CHEMOTHERAPY INDUCED NEUTROPENIA: ICD-10-CM

## 2020-12-21 DIAGNOSIS — T45.1X5A CHEMOTHERAPY INDUCED NEUTROPENIA: ICD-10-CM

## 2020-12-21 DIAGNOSIS — Z95.810 CARDIAC RESYNCHRONIZATION THERAPY DEFIBRILLATOR (CRT-D) IN PLACE: ICD-10-CM

## 2020-12-21 DIAGNOSIS — T45.1X5A ANTINEOPLASTIC CHEMOTHERAPY INDUCED ANEMIA: ICD-10-CM

## 2020-12-21 DIAGNOSIS — J41.0 SIMPLE CHRONIC BRONCHITIS: ICD-10-CM

## 2020-12-21 DIAGNOSIS — D69.59 CHEMOTHERAPY-INDUCED THROMBOCYTOPENIA: ICD-10-CM

## 2020-12-21 DIAGNOSIS — Z00.6 EXAMINATION OF PARTICIPANT IN CLINICAL TRIAL: ICD-10-CM

## 2020-12-21 DIAGNOSIS — I10 ESSENTIAL HYPERTENSION: ICD-10-CM

## 2020-12-21 DIAGNOSIS — T45.1X5A CHEMOTHERAPY-INDUCED THROMBOCYTOPENIA: ICD-10-CM

## 2020-12-21 DIAGNOSIS — Z85.3 HISTORY OF BREAST CANCER IN FEMALE: ICD-10-CM

## 2020-12-21 DIAGNOSIS — I44.7 LEFT BUNDLE-BRANCH BLOCK: ICD-10-CM

## 2020-12-21 DIAGNOSIS — E11.9 TYPE 2 DIABETES MELLITUS WITHOUT COMPLICATION, WITHOUT LONG-TERM CURRENT USE OF INSULIN: ICD-10-CM

## 2020-12-21 DIAGNOSIS — Z00.6 CLINICAL TRIAL PARTICIPANT: ICD-10-CM

## 2020-12-21 LAB
ALBUMIN SERPL BCP-MCNC: 3.4 G/DL (ref 3.5–5.2)
ALP SERPL-CCNC: 43 U/L (ref 55–135)
ALT SERPL W/O P-5'-P-CCNC: 36 U/L (ref 10–44)
ANION GAP SERPL CALC-SCNC: 10 MMOL/L (ref 8–16)
ANISOCYTOSIS BLD QL SMEAR: SLIGHT
AST SERPL-CCNC: 20 U/L (ref 10–40)
BASOPHILS # BLD AUTO: ABNORMAL K/UL (ref 0–0.2)
BASOPHILS NFR BLD: 1 % (ref 0–1.9)
BILIRUB SERPL-MCNC: 0.6 MG/DL (ref 0.1–1)
BUN SERPL-MCNC: 12 MG/DL (ref 8–23)
CALCIUM SERPL-MCNC: 9.1 MG/DL (ref 8.7–10.5)
CHLORIDE SERPL-SCNC: 105 MMOL/L (ref 95–110)
CO2 SERPL-SCNC: 26 MMOL/L (ref 23–29)
CREAT SERPL-MCNC: 0.8 MG/DL (ref 0.5–1.4)
DIFFERENTIAL METHOD: ABNORMAL
EOSINOPHIL # BLD AUTO: ABNORMAL K/UL (ref 0–0.5)
EOSINOPHIL NFR BLD: 3 % (ref 0–8)
ERYTHROCYTE [DISTWIDTH] IN BLOOD BY AUTOMATED COUNT: 16.5 % (ref 11.5–14.5)
EST. GFR  (AFRICAN AMERICAN): >60 ML/MIN/1.73 M^2
EST. GFR  (NON AFRICAN AMERICAN): >60 ML/MIN/1.73 M^2
GLUCOSE SERPL-MCNC: 192 MG/DL (ref 70–110)
HCT VFR BLD AUTO: 34.6 % (ref 37–48.5)
HGB BLD-MCNC: 10.7 G/DL (ref 12–16)
IMM GRANULOCYTES # BLD AUTO: ABNORMAL K/UL (ref 0–0.04)
IMM GRANULOCYTES NFR BLD AUTO: ABNORMAL % (ref 0–0.5)
LYMPHOCYTES # BLD AUTO: ABNORMAL K/UL (ref 1–4.8)
LYMPHOCYTES NFR BLD: 25 % (ref 18–48)
MCH RBC QN AUTO: 28 PG (ref 27–31)
MCHC RBC AUTO-ENTMCNC: 30.9 G/DL (ref 32–36)
MCV RBC AUTO: 91 FL (ref 82–98)
MONOCYTES # BLD AUTO: ABNORMAL K/UL (ref 0.3–1)
MONOCYTES NFR BLD: 8 % (ref 4–15)
MYELOCYTES NFR BLD MANUAL: 1 %
NEUTROPHILS NFR BLD: 62 % (ref 38–73)
NRBC BLD-RTO: 0 /100 WBC
PLATELET # BLD AUTO: 84 K/UL (ref 150–350)
PLATELET BLD QL SMEAR: ABNORMAL
PMV BLD AUTO: 11.8 FL (ref 9.2–12.9)
POIKILOCYTOSIS BLD QL SMEAR: SLIGHT
POTASSIUM SERPL-SCNC: 4.5 MMOL/L (ref 3.5–5.1)
PROT SERPL-MCNC: 6.5 G/DL (ref 6–8.4)
RBC # BLD AUTO: 3.82 M/UL (ref 4–5.4)
SCHISTOCYTES BLD QL SMEAR: PRESENT
SODIUM SERPL-SCNC: 141 MMOL/L (ref 136–145)
WBC # BLD AUTO: 1.39 K/UL (ref 3.9–12.7)

## 2020-12-21 PROCEDURE — 80053 COMPREHEN METABOLIC PANEL: CPT | Mod: HCNC,Q1

## 2020-12-21 PROCEDURE — G6002 STEREOSCOPIC X-RAY GUIDANCE: HCPCS | Mod: 26,HCNC,, | Performed by: RADIOLOGY

## 2020-12-21 PROCEDURE — 77386 HC IMRT, COMPLEX: CPT | Mod: HCNC | Performed by: RADIOLOGY

## 2020-12-21 PROCEDURE — 36415 COLL VENOUS BLD VENIPUNCTURE: CPT | Mod: HCNC

## 2020-12-21 PROCEDURE — 99999 PR PBB SHADOW E&M-EST. PATIENT-LVL IV: ICD-10-PCS | Mod: PBBFAC,HCNC,, | Performed by: INTERNAL MEDICINE

## 2020-12-21 PROCEDURE — 85007 BL SMEAR W/DIFF WBC COUNT: CPT | Mod: HCNC,Q1

## 2020-12-21 PROCEDURE — G6002 PR STEREOSCOPIC XRAY GUIDE FOR RADIATION TX DELIV: ICD-10-PCS | Mod: 26,HCNC,, | Performed by: RADIOLOGY

## 2020-12-21 PROCEDURE — 85027 COMPLETE CBC AUTOMATED: CPT | Mod: HCNC

## 2020-12-21 PROCEDURE — 99215 OFFICE O/P EST HI 40 MIN: CPT | Mod: S$PBB,HCNC,, | Performed by: INTERNAL MEDICINE

## 2020-12-21 PROCEDURE — 99215 PR OFFICE/OUTPT VISIT, EST, LEVL V, 40-54 MIN: ICD-10-PCS | Mod: S$PBB,HCNC,, | Performed by: INTERNAL MEDICINE

## 2020-12-21 PROCEDURE — 99999 PR PBB SHADOW E&M-EST. PATIENT-LVL IV: CPT | Mod: PBBFAC,HCNC,, | Performed by: INTERNAL MEDICINE

## 2020-12-21 RX ORDER — EPINEPHRINE 0.3 MG/.3ML
0.3 INJECTION SUBCUTANEOUS ONCE AS NEEDED
Status: CANCELLED | OUTPATIENT
Start: 2020-12-22

## 2020-12-21 RX ORDER — DIPHENHYDRAMINE HYDROCHLORIDE 50 MG/ML
50 INJECTION INTRAMUSCULAR; INTRAVENOUS ONCE AS NEEDED
Status: CANCELLED | OUTPATIENT
Start: 2020-12-22

## 2020-12-21 RX ORDER — HEPARIN 100 UNIT/ML
500 SYRINGE INTRAVENOUS
Status: CANCELLED | OUTPATIENT
Start: 2020-12-22

## 2020-12-21 RX ORDER — FAMOTIDINE 10 MG/ML
20 INJECTION INTRAVENOUS
Status: CANCELLED | OUTPATIENT
Start: 2020-12-22

## 2020-12-21 RX ORDER — SODIUM CHLORIDE 0.9 % (FLUSH) 0.9 %
10 SYRINGE (ML) INJECTION
Status: CANCELLED | OUTPATIENT
Start: 2020-12-22

## 2020-12-21 NOTE — PROGRESS NOTES
"Protocol: Randomized Phase III Trial of [id number] (Durvalumab) as Concurrent and Consolidative Therapy or Consolidative Therapy Alone for Unresectable Stage 3 NSCLC   Protocol # [id number]          IRB # 2020.326  PI: Carmen Akhtar MD  Treating Physician: Javi Avina MD  Study ID #: [id number]  Assigned ARM: ARM B/ Investigator Regimen Option# 3 Concurrent Chemo/RT with Carboplatin and Taxol    December 21, 2020    Cycle 6 MD visit    Patient seen in clinic today for her C6 MD visit. Labs and H&P completed. Con meds and AEs reviewed. She is doing well. ECOG=0. Notes that her daughter gave her a "buzz cut". Her blood counts are decreased today. She denies any fevers or abnormal bleeding/bruising. She did have two instances of "queasiness" that was relieved by antiemetics. Otherwise, she has been doing great.     Current AEs    Anxiety- Grade 2- Patient takes Wellbutrin and Zoloft.   Depression- Grade 2- Patient takes Wellbutrin and Zoloft.  Hypertension- Grade 2 (intermittent)-  Patient records her BPs at home and SBPs range 110s-140s and her DBP ranges 60s-80s.. Her BP in clinic today is 130/66. She takes Coreg, Lisinopril, and Lasix PRN. She did not take her AM medications this AM.   Hyperglycemia- Grade 2- Patient has history of DM Type II. She takes Metformin for this. (Patient did eat prior to labs today). She has been monitoring her blood glucose at home and it has been ranging     90s -120s fasting per patient.   Diarrhea-(not graded/baseline issue). No changes. Patient has loose watery stools after taking her Metformin. At baseline she states that she usually has about 4 loose stools in the AM and 2 loose stools in the PM. Her baseline # of stools is about 6 a day. She controls this with Imodium PRN. Will monitor. Patient educated on s/s to notify physician of. Lately she has only had about 3 loose stools a day.  Anorexia- Grade 2- Her appetite has improved since starting dexamethasone for Taxol infusion prophylaxis. " She is eating well and drinks Glucerna.   Anemia- Grade 1 (baseline issue/intermittent) Hgb is 10.7 g/dl on today's labs.   Esophagitis- Grade 1- resolved.   Neutrophil count decreased- Grade 3-  cells/ul. Reviewed dose mod sections 5.4.3 and 5.4.5 with Dr. Avina. Patient educated on neutropenic precautions.  White blood cell decreased- Grade 3- WBC- 1.39 K/ul. Reviewed dose mod sections 5.4.3 and 5.4.5 with Dr. Avina. Patient educated on neutropenic precautions.  Platelet count decreased- Grade 1- 84 K/ul. Reviewed dose mod sections 5.4.3 and 5.4.5 with Dr. Avina. Patient educated on bleeding precautions.  Alopecia- Grade 2- Patient states her daughter gave her a buzz cut since she was losing her hair. She wears a wrap around her head.   Nausea- Grade 1- She did have two instances of queasiness that was relieved by antiemetics.    Reviewed dose mod sections 5.4.3 and 5.4.5 with Dr. Avina. Labs and AEs are acceptable for treatment per Dr. Avina. Dosing timeout performed with Dr. Avina. Taxol dosage calculated with screening weight 87.2 kg  (BSA 1.95 m2) per protocol. Carboplatin calculated with screening weight and C6D1 labs. Referred to study chair Dr. Trujillo's protocol clarification email. Orders signed by MD. Patient will return for infusion tomorrow. She has been compliant with her home dexamethasone. She is aware that she is to stop taking her home dexamethasone after this cycle since she will be starting immunotherapy.

## 2020-12-22 ENCOUNTER — INFUSION (OUTPATIENT)
Dept: INFUSION THERAPY | Facility: HOSPITAL | Age: 65
End: 2020-12-22
Payer: MEDICARE

## 2020-12-22 VITALS
TEMPERATURE: 98 F | BODY MASS INDEX: 34.69 KG/M2 | WEIGHT: 188.5 LBS | RESPIRATION RATE: 16 BRPM | DIASTOLIC BLOOD PRESSURE: 77 MMHG | SYSTOLIC BLOOD PRESSURE: 133 MMHG | HEIGHT: 62 IN | HEART RATE: 88 BPM

## 2020-12-22 DIAGNOSIS — R91.8 LUNG MASS: Primary | ICD-10-CM

## 2020-12-22 PROCEDURE — 63600175 PHARM REV CODE 636 W HCPCS: Mod: HCNC,Q1 | Performed by: INTERNAL MEDICINE

## 2020-12-22 PROCEDURE — 96376 TX/PRO/DX INJ SAME DRUG ADON: CPT | Mod: HCNC

## 2020-12-22 PROCEDURE — 25000003 PHARM REV CODE 250: Mod: HCNC | Performed by: INTERNAL MEDICINE

## 2020-12-22 PROCEDURE — G6002 PR STEREOSCOPIC XRAY GUIDE FOR RADIATION TX DELIV: ICD-10-PCS | Mod: 26,HCNC,, | Performed by: RADIOLOGY

## 2020-12-22 PROCEDURE — 96367 TX/PROPH/DG ADDL SEQ IV INF: CPT | Mod: HCNC

## 2020-12-22 PROCEDURE — 77336 RADIATION PHYSICS CONSULT: CPT | Mod: HCNC | Performed by: RADIOLOGY

## 2020-12-22 PROCEDURE — A4216 STERILE WATER/SALINE, 10 ML: HCPCS | Mod: HCNC | Performed by: INTERNAL MEDICINE

## 2020-12-22 PROCEDURE — G6002 STEREOSCOPIC X-RAY GUIDANCE: HCPCS | Mod: 26,HCNC,, | Performed by: RADIOLOGY

## 2020-12-22 PROCEDURE — 96413 CHEMO IV INFUSION 1 HR: CPT | Mod: HCNC,Q1

## 2020-12-22 PROCEDURE — 77417 THER RADIOLOGY PORT IMAGE(S): CPT | Mod: HCNC | Performed by: RADIOLOGY

## 2020-12-22 PROCEDURE — 77386 HC IMRT, COMPLEX: CPT | Mod: HCNC | Performed by: RADIOLOGY

## 2020-12-22 PROCEDURE — 96417 CHEMO IV INFUS EACH ADDL SEQ: CPT | Mod: HCNC,Q1

## 2020-12-22 RX ORDER — SODIUM CHLORIDE 0.9 % (FLUSH) 0.9 %
10 SYRINGE (ML) INJECTION
Status: DISCONTINUED | OUTPATIENT
Start: 2020-12-22 | End: 2020-12-22 | Stop reason: HOSPADM

## 2020-12-22 RX ORDER — EPINEPHRINE 0.3 MG/.3ML
0.3 INJECTION SUBCUTANEOUS ONCE AS NEEDED
Status: DISCONTINUED | OUTPATIENT
Start: 2020-12-22 | End: 2020-12-22 | Stop reason: HOSPADM

## 2020-12-22 RX ORDER — DIPHENHYDRAMINE HYDROCHLORIDE 50 MG/ML
50 INJECTION INTRAMUSCULAR; INTRAVENOUS ONCE AS NEEDED
Status: DISCONTINUED | OUTPATIENT
Start: 2020-12-22 | End: 2020-12-22 | Stop reason: HOSPADM

## 2020-12-22 RX ORDER — FAMOTIDINE 10 MG/ML
20 INJECTION INTRAVENOUS
Status: COMPLETED | OUTPATIENT
Start: 2020-12-22 | End: 2020-12-22

## 2020-12-22 RX ORDER — HEPARIN 100 UNIT/ML
500 SYRINGE INTRAVENOUS
Status: DISCONTINUED | OUTPATIENT
Start: 2020-12-22 | End: 2020-12-22 | Stop reason: HOSPADM

## 2020-12-22 RX ADMIN — APREPITANT 130 MG: 130 INJECTION, EMULSION INTRAVENOUS at 11:12

## 2020-12-22 RX ADMIN — FAMOTIDINE 20 MG: 10 INJECTION INTRAVENOUS at 11:12

## 2020-12-22 RX ADMIN — HEPARIN 500 UNITS: 100 SYRINGE at 01:12

## 2020-12-22 RX ADMIN — SODIUM CHLORIDE: 9 INJECTION, SOLUTION INTRAVENOUS at 10:12

## 2020-12-22 RX ADMIN — Medication 10 ML: at 01:12

## 2020-12-22 RX ADMIN — CARBOPLATIN 250 MG: 10 INJECTION INTRAVENOUS at 12:12

## 2020-12-22 RX ADMIN — DIPHENHYDRAMINE HYDROCHLORIDE 50 MG: 50 INJECTION, SOLUTION INTRAMUSCULAR; INTRAVENOUS at 10:12

## 2020-12-22 RX ADMIN — PALONOSETRON 0.25 MG: 0.25 INJECTION, SOLUTION INTRAVENOUS at 10:12

## 2020-12-22 RX ADMIN — PACLITAXEL 90 MG: 6 INJECTION, SOLUTION, CONCENTRATE INTRAVENOUS at 11:12

## 2020-12-23 PROCEDURE — 77386 HC IMRT, COMPLEX: CPT | Mod: HCNC | Performed by: RADIOLOGY

## 2020-12-23 PROCEDURE — 77014 PR  CT GUIDANCE PLACEMENT RAD THERAPY FIELDS: CPT | Mod: 26,HCNC,, | Performed by: RADIOLOGY

## 2020-12-23 PROCEDURE — 77014 PR  CT GUIDANCE PLACEMENT RAD THERAPY FIELDS: ICD-10-PCS | Mod: 26,HCNC,, | Performed by: RADIOLOGY

## 2020-12-23 PROCEDURE — 77014 HC CT GUIDANCE RADIATION THERAPY FLDS PLACEMENT: CPT | Mod: TC,HCNC | Performed by: RADIOLOGY

## 2020-12-24 PROCEDURE — 77386 HC IMRT, COMPLEX: CPT | Mod: HCNC | Performed by: RADIOLOGY

## 2020-12-24 PROCEDURE — G6002 STEREOSCOPIC X-RAY GUIDANCE: HCPCS | Mod: 26,HCNC,, | Performed by: RADIOLOGY

## 2020-12-24 PROCEDURE — G6002 PR STEREOSCOPIC XRAY GUIDE FOR RADIATION TX DELIV: ICD-10-PCS | Mod: 26,HCNC,, | Performed by: RADIOLOGY

## 2020-12-28 PROCEDURE — G6002 STEREOSCOPIC X-RAY GUIDANCE: HCPCS | Mod: 26,HCNC,, | Performed by: RADIOLOGY

## 2020-12-28 PROCEDURE — 77386 HC IMRT, COMPLEX: CPT | Mod: HCNC | Performed by: RADIOLOGY

## 2020-12-28 PROCEDURE — G6002 PR STEREOSCOPIC XRAY GUIDE FOR RADIATION TX DELIV: ICD-10-PCS | Mod: 26,HCNC,, | Performed by: RADIOLOGY

## 2020-12-29 PROCEDURE — 77386 HC IMRT, COMPLEX: CPT | Mod: HCNC | Performed by: RADIOLOGY

## 2020-12-29 PROCEDURE — G6002 STEREOSCOPIC X-RAY GUIDANCE: HCPCS | Mod: 26,HCNC,, | Performed by: RADIOLOGY

## 2020-12-29 PROCEDURE — G6002 PR STEREOSCOPIC XRAY GUIDE FOR RADIATION TX DELIV: ICD-10-PCS | Mod: 26,HCNC,, | Performed by: RADIOLOGY

## 2020-12-30 ENCOUNTER — DOCUMENTATION ONLY (OUTPATIENT)
Dept: RADIATION ONCOLOGY | Facility: CLINIC | Age: 65
End: 2020-12-30

## 2020-12-30 ENCOUNTER — CLINICAL SUPPORT (OUTPATIENT)
Dept: CARDIOLOGY | Facility: HOSPITAL | Age: 65
End: 2020-12-30
Payer: MEDICARE

## 2020-12-30 DIAGNOSIS — Z95.810 PRESENCE OF AUTOMATIC (IMPLANTABLE) CARDIAC DEFIBRILLATOR: ICD-10-CM

## 2020-12-30 PROCEDURE — 93295 DEV INTERROG REMOTE 1/2/MLT: CPT | Mod: ,,, | Performed by: INTERNAL MEDICINE

## 2020-12-30 PROCEDURE — 93295 CARDIAC DEVICE CHECK - REMOTE: ICD-10-PCS | Mod: ,,, | Performed by: INTERNAL MEDICINE

## 2020-12-30 PROCEDURE — 93296 REM INTERROG EVL PM/IDS: CPT | Mod: HCNC | Performed by: INTERNAL MEDICINE

## 2020-12-30 PROCEDURE — G6002 STEREOSCOPIC X-RAY GUIDANCE: HCPCS | Mod: 26,HCNC,, | Performed by: RADIOLOGY

## 2020-12-30 PROCEDURE — 77386 HC IMRT, COMPLEX: CPT | Mod: HCNC | Performed by: RADIOLOGY

## 2020-12-30 PROCEDURE — G6002 PR STEREOSCOPIC XRAY GUIDE FOR RADIATION TX DELIV: ICD-10-PCS | Mod: 26,HCNC,, | Performed by: RADIOLOGY

## 2020-12-31 PROCEDURE — 77336 RADIATION PHYSICS CONSULT: CPT | Mod: HCNC | Performed by: RADIOLOGY

## 2021-01-04 ENCOUNTER — HOSPITAL ENCOUNTER (OUTPATIENT)
Dept: PULMONOLOGY | Facility: CLINIC | Age: 66
Discharge: HOME | End: 2021-01-04
Payer: MEDICARE

## 2021-01-04 ENCOUNTER — HOSPITAL ENCOUNTER (OUTPATIENT)
Dept: RADIOLOGY | Facility: HOSPITAL | Age: 66
Discharge: HOME | End: 2021-01-04
Attending: INTERNAL MEDICINE
Payer: MEDICARE

## 2021-01-04 ENCOUNTER — CLINICAL SUPPORT (OUTPATIENT)
Dept: HEMATOLOGY/ONCOLOGY | Facility: CLINIC | Age: 66
End: 2021-01-04
Payer: MEDICARE

## 2021-01-04 DIAGNOSIS — Z00.6 EXAMINATION OF PARTICIPANT IN CLINICAL TRIAL: ICD-10-CM

## 2021-01-04 DIAGNOSIS — C34.91 NSCLC OF RIGHT LUNG: ICD-10-CM

## 2021-01-04 DIAGNOSIS — Z13.9 SCREENING FOR CONDITION: Primary | ICD-10-CM

## 2021-01-04 LAB — SARS-COV-2 RDRP RESP QL NAA+PROBE: NEGATIVE

## 2021-01-04 PROCEDURE — 71260 CT THORAX DX C+: CPT | Mod: TC

## 2021-01-04 PROCEDURE — 71260 CT CHEST WITH CONTRAST: ICD-10-PCS | Mod: 26,,, | Performed by: RADIOLOGY

## 2021-01-04 PROCEDURE — 25500020 PHARM REV CODE 255: Performed by: INTERNAL MEDICINE

## 2021-01-04 PROCEDURE — 71260 CT THORAX DX C+: CPT | Mod: 26,,, | Performed by: RADIOLOGY

## 2021-01-04 PROCEDURE — 94060 EVALUATION OF WHEEZING: CPT | Mod: S$GLB,,, | Performed by: INTERNAL MEDICINE

## 2021-01-04 PROCEDURE — U0002 COVID-19 LAB TEST NON-CDC: HCPCS

## 2021-01-04 PROCEDURE — 94060 PR EVAL OF BRONCHOSPASM: ICD-10-PCS | Mod: S$GLB,,, | Performed by: INTERNAL MEDICINE

## 2021-01-04 PROCEDURE — 94729 PR C02/MEMBANE DIFFUSE CAPACITY: ICD-10-PCS | Mod: S$GLB,,, | Performed by: INTERNAL MEDICINE

## 2021-01-04 PROCEDURE — 94729 DIFFUSING CAPACITY: CPT | Mod: S$GLB,,, | Performed by: INTERNAL MEDICINE

## 2021-01-04 RX ADMIN — IOHEXOL 75 ML: 350 INJECTION, SOLUTION INTRAVENOUS at 12:01

## 2021-01-05 LAB
DLCO ADJ PRE: 11.16 ML/(MIN*MMHG) (ref 15.22–26.68)
DLCO SINGLE BREATH LLN: 15.22
DLCO SINGLE BREATH PRE REF: 48.3 %
DLCO SINGLE BREATH REF: 20.95
DLCOC SBVA LLN: 2.98
DLCOC SBVA PRE REF: 80.5 %
DLCOC SBVA REF: 4.58
DLCOC SINGLE BREATH LLN: 15.22
DLCOC SINGLE BREATH PRE REF: 53.3 %
DLCOC SINGLE BREATH REF: 20.95
DLCOCSBVAULN: 6.18
DLCOCSINGLEBREATHULN: 26.68
DLCOSINGLEBREATHULN: 26.68
DLCOVA LLN: 2.98
DLCOVA PRE REF: 73 %
DLCOVA PRE: 3.34 ML/(MIN*MMHG*L) (ref 2.98–6.18)
DLCOVA REF: 4.58
DLCOVAULN: 6.18
DLVAADJ PRE: 3.69 ML/(MIN*MMHG*L) (ref 2.98–6.18)
FEF 25 75 LLN: 1.01
FEF 25 75 PRE REF: 19.8 %
FEF 25 75 REF: 2.03
FET100 CHG: -4.4 %
FEV05 LLN: 0.84
FEV05 REF: 1.7
FEV1 CHG: -6.9 %
FEV1 FVC LLN: 67
FEV1 FVC PRE REF: 67.9 %
FEV1 FVC REF: 79
FEV1 LLN: 1.67
FEV1 PRE REF: 43.3 %
FEV1 REF: 2.23
FEV1 VOL CHG: -0.07
FVC CHG: -9.7 %
FVC LLN: 2.12
FVC PRE REF: 63.4 %
FVC REF: 2.83
FVC VOL CHG: -0.17
IVC PRE: 1.75 L (ref 2.12–3.54)
IVC SINGLE BREATH LLN: 2.12
IVC SINGLE BREATH PRE REF: 61.7 %
IVC SINGLE BREATH REF: 2.83
IVCSINGLEBREATHULN: 3.54
PEF LLN: 4.19
PEF PRE REF: 57.3 %
PEF REF: 5.79
PHYSICIAN COMMENT: ABNORMAL
POST FEF 25 75: 0.4 L/S (ref 1.01–3.05)
POST FET 100: 6.66 SEC
POST FEV1 FVC: 55.49 % (ref 66.79–91.7)
POST FEV1: 0.9 L (ref 1.67–2.8)
POST FEV5: 0.67 L (ref 0.84–2.55)
POST FVC: 1.62 L (ref 2.12–3.54)
POST PEF: 3.34 L/S (ref 4.19–7.39)
PRE DLCO: 10.11 ML/(MIN*MMHG) (ref 15.22–26.68)
PRE FEF 25 75: 0.4 L/S (ref 1.01–3.05)
PRE FET 100: 6.97 SEC
PRE FEV05 REF: 41.4 %
PRE FEV1 FVC: 53.84 % (ref 66.79–91.7)
PRE FEV1: 0.97 L (ref 1.67–2.8)
PRE FEV5: 0.7 L (ref 0.84–2.55)
PRE FVC: 1.8 L (ref 2.12–3.54)
PRE PEF: 3.32 L/S (ref 4.19–7.39)
VA PRE: 3.03 L (ref 4.42–4.42)
VA SINGLE BREATH LLN: 4.42
VA SINGLE BREATH PRE REF: 68.5 %
VA SINGLE BREATH REF: 4.42
VASINGLEBREATHULN: 4.42

## 2021-01-06 ENCOUNTER — LAB VISIT (OUTPATIENT)
Dept: LAB | Facility: HOSPITAL | Age: 66
End: 2021-01-06
Attending: INTERNAL MEDICINE
Payer: MEDICARE

## 2021-01-06 ENCOUNTER — RESEARCH ENCOUNTER (OUTPATIENT)
Dept: RESEARCH | Facility: HOSPITAL | Age: 66
End: 2021-01-06

## 2021-01-06 ENCOUNTER — NURSE TRIAGE (OUTPATIENT)
Dept: ADMINISTRATIVE | Facility: CLINIC | Age: 66
End: 2021-01-06

## 2021-01-06 ENCOUNTER — DOCUMENTATION ONLY (OUTPATIENT)
Dept: HEMATOLOGY/ONCOLOGY | Facility: CLINIC | Age: 66
End: 2021-01-06

## 2021-01-06 DIAGNOSIS — Z00.6 EXAMINATION OF PARTICIPANT IN CLINICAL TRIAL: ICD-10-CM

## 2021-01-06 DIAGNOSIS — C34.91 NSCLC OF RIGHT LUNG: ICD-10-CM

## 2021-01-06 DIAGNOSIS — R68.89 OTHER GENERAL SYMPTOMS AND SIGNS: ICD-10-CM

## 2021-01-06 LAB
ALBUMIN SERPL BCP-MCNC: 3.6 G/DL (ref 3.5–5.2)
ALP SERPL-CCNC: 44 U/L (ref 55–135)
ALT SERPL W/O P-5'-P-CCNC: 27 U/L (ref 10–44)
ANION GAP SERPL CALC-SCNC: 9 MMOL/L (ref 8–16)
ANISOCYTOSIS BLD QL SMEAR: ABNORMAL
AST SERPL-CCNC: 20 U/L (ref 10–40)
BASOPHILS # BLD AUTO: ABNORMAL K/UL (ref 0–0.2)
BASOPHILS NFR BLD: 0 % (ref 0–1.9)
BILIRUB SERPL-MCNC: 0.4 MG/DL (ref 0.1–1)
BUN SERPL-MCNC: 13 MG/DL (ref 8–23)
CALCIUM SERPL-MCNC: 9.3 MG/DL (ref 8.7–10.5)
CHLORIDE SERPL-SCNC: 104 MMOL/L (ref 95–110)
CO2 SERPL-SCNC: 27 MMOL/L (ref 23–29)
CREAT SERPL-MCNC: 0.8 MG/DL (ref 0.5–1.4)
DIFFERENTIAL METHOD: ABNORMAL
EOSINOPHIL # BLD AUTO: ABNORMAL K/UL (ref 0–0.5)
EOSINOPHIL NFR BLD: 0 % (ref 0–8)
ERYTHROCYTE [DISTWIDTH] IN BLOOD BY AUTOMATED COUNT: 21.1 % (ref 11.5–14.5)
EST. GFR  (AFRICAN AMERICAN): >60 ML/MIN/1.73 M^2
EST. GFR  (NON AFRICAN AMERICAN): >60 ML/MIN/1.73 M^2
GLUCOSE SERPL-MCNC: 220 MG/DL (ref 70–110)
HCT VFR BLD AUTO: 29.6 % (ref 37–48.5)
HGB BLD-MCNC: 10 G/DL (ref 12–16)
HYPOCHROMIA BLD QL SMEAR: ABNORMAL
IMM GRANULOCYTES # BLD AUTO: ABNORMAL K/UL (ref 0–0.04)
IMM GRANULOCYTES NFR BLD AUTO: ABNORMAL % (ref 0–0.5)
LYMPHOCYTES # BLD AUTO: ABNORMAL K/UL (ref 1–4.8)
LYMPHOCYTES NFR BLD: 28 % (ref 18–48)
MCH RBC QN AUTO: 29.9 PG (ref 27–31)
MCHC RBC AUTO-ENTMCNC: 33.8 G/DL (ref 32–36)
MCV RBC AUTO: 89 FL (ref 82–98)
MONOCYTES # BLD AUTO: ABNORMAL K/UL (ref 0.3–1)
MONOCYTES NFR BLD: 20 % (ref 4–15)
NEUTROPHILS NFR BLD: 50 % (ref 38–73)
NEUTS BAND NFR BLD MANUAL: 2 %
NRBC BLD-RTO: 0 /100 WBC
PLATELET # BLD AUTO: 140 K/UL (ref 150–350)
PMV BLD AUTO: 10.4 FL (ref 9.2–12.9)
POLYCHROMASIA BLD QL SMEAR: ABNORMAL
POTASSIUM SERPL-SCNC: 4.3 MMOL/L (ref 3.5–5.1)
PROT SERPL-MCNC: 6.9 G/DL (ref 6–8.4)
RBC # BLD AUTO: 3.34 M/UL (ref 4–5.4)
SODIUM SERPL-SCNC: 140 MMOL/L (ref 136–145)
TSH SERPL DL<=0.005 MIU/L-ACNC: 1.78 UIU/ML (ref 0.4–4)
WBC # BLD AUTO: 1.67 K/UL (ref 3.9–12.7)

## 2021-01-06 PROCEDURE — 80053 COMPREHEN METABOLIC PANEL: CPT | Mod: Q1

## 2021-01-06 PROCEDURE — 85027 COMPLETE CBC AUTOMATED: CPT | Mod: Q1

## 2021-01-06 PROCEDURE — 36415 COLL VENOUS BLD VENIPUNCTURE: CPT

## 2021-01-06 PROCEDURE — 84443 ASSAY THYROID STIM HORMONE: CPT | Mod: Q1

## 2021-01-06 PROCEDURE — 85007 BL SMEAR W/DIFF WBC COUNT: CPT

## 2021-01-07 ENCOUNTER — OFFICE VISIT (OUTPATIENT)
Dept: HEMATOLOGY/ONCOLOGY | Facility: CLINIC | Age: 66
End: 2021-01-07
Payer: MEDICARE

## 2021-01-07 ENCOUNTER — RESEARCH ENCOUNTER (OUTPATIENT)
Dept: RESEARCH | Facility: HOSPITAL | Age: 66
End: 2021-01-07

## 2021-01-07 ENCOUNTER — PATIENT MESSAGE (OUTPATIENT)
Dept: FAMILY MEDICINE | Facility: CLINIC | Age: 66
End: 2021-01-07

## 2021-01-07 ENCOUNTER — INFUSION (OUTPATIENT)
Dept: INFUSION THERAPY | Facility: HOSPITAL | Age: 66
End: 2021-01-07
Payer: MEDICARE

## 2021-01-07 VITALS
RESPIRATION RATE: 16 BRPM | TEMPERATURE: 97 F | BODY MASS INDEX: 34.77 KG/M2 | OXYGEN SATURATION: 97 % | WEIGHT: 188.94 LBS | DIASTOLIC BLOOD PRESSURE: 70 MMHG | SYSTOLIC BLOOD PRESSURE: 149 MMHG | HEIGHT: 62 IN | HEART RATE: 93 BPM

## 2021-01-07 VITALS
SYSTOLIC BLOOD PRESSURE: 129 MMHG | OXYGEN SATURATION: 97 % | DIASTOLIC BLOOD PRESSURE: 78 MMHG | BODY MASS INDEX: 34.77 KG/M2 | RESPIRATION RATE: 18 BRPM | WEIGHT: 188.94 LBS | HEART RATE: 93 BPM | TEMPERATURE: 98 F | HEIGHT: 62 IN

## 2021-01-07 DIAGNOSIS — J41.0 SIMPLE CHRONIC BRONCHITIS: ICD-10-CM

## 2021-01-07 DIAGNOSIS — R91.8 LUNG MASS: ICD-10-CM

## 2021-01-07 DIAGNOSIS — Z00.6 CLINICAL TRIAL PARTICIPANT: ICD-10-CM

## 2021-01-07 DIAGNOSIS — Z00.6 EXAMINATION OF PARTICIPANT IN CLINICAL TRIAL: ICD-10-CM

## 2021-01-07 DIAGNOSIS — Z95.810 CARDIAC RESYNCHRONIZATION THERAPY DEFIBRILLATOR (CRT-D) IN PLACE: ICD-10-CM

## 2021-01-07 DIAGNOSIS — T45.1X5A CHEMOTHERAPY INDUCED NEUTROPENIA: ICD-10-CM

## 2021-01-07 DIAGNOSIS — C34.91 NSCLC OF RIGHT LUNG: Primary | ICD-10-CM

## 2021-01-07 DIAGNOSIS — D69.59 CHEMOTHERAPY-INDUCED THROMBOCYTOPENIA: ICD-10-CM

## 2021-01-07 DIAGNOSIS — I44.7 LEFT BUNDLE-BRANCH BLOCK: ICD-10-CM

## 2021-01-07 DIAGNOSIS — T45.1X5A ANTINEOPLASTIC CHEMOTHERAPY INDUCED ANEMIA: ICD-10-CM

## 2021-01-07 DIAGNOSIS — Z85.3 HISTORY OF BREAST CANCER IN FEMALE: ICD-10-CM

## 2021-01-07 DIAGNOSIS — D64.81 ANTINEOPLASTIC CHEMOTHERAPY INDUCED ANEMIA: ICD-10-CM

## 2021-01-07 DIAGNOSIS — I42.0 DILATED CARDIOMYOPATHY: ICD-10-CM

## 2021-01-07 DIAGNOSIS — D70.1 CHEMOTHERAPY INDUCED NEUTROPENIA: ICD-10-CM

## 2021-01-07 DIAGNOSIS — E11.9 TYPE 2 DIABETES MELLITUS WITHOUT COMPLICATION, WITHOUT LONG-TERM CURRENT USE OF INSULIN: ICD-10-CM

## 2021-01-07 DIAGNOSIS — T45.1X5A CHEMOTHERAPY-INDUCED THROMBOCYTOPENIA: ICD-10-CM

## 2021-01-07 DIAGNOSIS — I10 ESSENTIAL HYPERTENSION: ICD-10-CM

## 2021-01-07 PROCEDURE — 63600175 PHARM REV CODE 636 W HCPCS: Performed by: INTERNAL MEDICINE

## 2021-01-07 PROCEDURE — 25000003 PHARM REV CODE 250: Mod: Q1 | Performed by: INTERNAL MEDICINE

## 2021-01-07 PROCEDURE — A4216 STERILE WATER/SALINE, 10 ML: HCPCS | Performed by: INTERNAL MEDICINE

## 2021-01-07 PROCEDURE — 99215 OFFICE O/P EST HI 40 MIN: CPT | Mod: S$PBB,HCNC,, | Performed by: INTERNAL MEDICINE

## 2021-01-07 PROCEDURE — 99999 PR PBB SHADOW E&M-EST. PATIENT-LVL IV: CPT | Mod: PBBFAC,HCNC,, | Performed by: INTERNAL MEDICINE

## 2021-01-07 PROCEDURE — 96413 CHEMO IV INFUSION 1 HR: CPT

## 2021-01-07 PROCEDURE — 99999 PR PBB SHADOW E&M-EST. PATIENT-LVL IV: ICD-10-PCS | Mod: PBBFAC,HCNC,, | Performed by: INTERNAL MEDICINE

## 2021-01-07 PROCEDURE — 99215 PR OFFICE/OUTPT VISIT, EST, LEVL V, 40-54 MIN: ICD-10-PCS | Mod: S$PBB,HCNC,, | Performed by: INTERNAL MEDICINE

## 2021-01-07 RX ORDER — SODIUM CHLORIDE 0.9 % (FLUSH) 0.9 %
10 SYRINGE (ML) INJECTION
Status: CANCELLED | OUTPATIENT
Start: 2021-01-07

## 2021-01-07 RX ORDER — SODIUM CHLORIDE 0.9 % (FLUSH) 0.9 %
10 SYRINGE (ML) INJECTION
Status: DISCONTINUED | OUTPATIENT
Start: 2021-01-07 | End: 2021-01-07 | Stop reason: HOSPADM

## 2021-01-07 RX ORDER — HEPARIN 100 UNIT/ML
500 SYRINGE INTRAVENOUS
Status: CANCELLED | OUTPATIENT
Start: 2021-01-07

## 2021-01-07 RX ORDER — DIPHENHYDRAMINE HYDROCHLORIDE 50 MG/ML
50 INJECTION INTRAMUSCULAR; INTRAVENOUS ONCE AS NEEDED
Status: CANCELLED | OUTPATIENT
Start: 2021-01-07

## 2021-01-07 RX ORDER — EPINEPHRINE 0.3 MG/.3ML
0.3 INJECTION SUBCUTANEOUS ONCE AS NEEDED
Status: DISCONTINUED | OUTPATIENT
Start: 2021-01-07 | End: 2021-01-07 | Stop reason: HOSPADM

## 2021-01-07 RX ORDER — EPINEPHRINE 0.3 MG/.3ML
0.3 INJECTION SUBCUTANEOUS ONCE AS NEEDED
Status: CANCELLED | OUTPATIENT
Start: 2021-01-07

## 2021-01-07 RX ORDER — DIPHENHYDRAMINE HYDROCHLORIDE 50 MG/ML
50 INJECTION INTRAMUSCULAR; INTRAVENOUS ONCE AS NEEDED
Status: DISCONTINUED | OUTPATIENT
Start: 2021-01-07 | End: 2021-01-07 | Stop reason: HOSPADM

## 2021-01-07 RX ORDER — HEPARIN 100 UNIT/ML
500 SYRINGE INTRAVENOUS
Status: DISCONTINUED | OUTPATIENT
Start: 2021-01-07 | End: 2021-01-07 | Stop reason: HOSPADM

## 2021-01-07 RX ADMIN — HEPARIN 500 UNITS: 100 SYRINGE at 02:01

## 2021-01-07 RX ADMIN — SODIUM CHLORIDE: 0.9 INJECTION, SOLUTION INTRAVENOUS at 01:01

## 2021-01-07 RX ADMIN — Medication 10 ML: at 02:01

## 2021-01-12 RX ORDER — METOPROLOL SUCCINATE 100 MG/1
100 TABLET, EXTENDED RELEASE ORAL DAILY
Qty: 90 TABLET | Refills: 3 | Status: SHIPPED | OUTPATIENT
Start: 2021-02-10 | End: 2021-04-23

## 2021-01-12 RX ORDER — METOPROLOL SUCCINATE 100 MG/1
100 TABLET, EXTENDED RELEASE ORAL DAILY
Qty: 30 TABLET | Refills: 0 | Status: SHIPPED | OUTPATIENT
Start: 2021-01-12 | End: 2021-02-04

## 2021-01-14 ENCOUNTER — TELEPHONE (OUTPATIENT)
Dept: RADIATION ONCOLOGY | Facility: CLINIC | Age: 66
End: 2021-01-14

## 2021-01-25 ENCOUNTER — PATIENT MESSAGE (OUTPATIENT)
Dept: FAMILY MEDICINE | Facility: CLINIC | Age: 66
End: 2021-01-25

## 2021-01-27 ENCOUNTER — OFFICE VISIT (OUTPATIENT)
Dept: RADIATION ONCOLOGY | Facility: CLINIC | Age: 66
End: 2021-01-27
Payer: MEDICARE

## 2021-01-27 VITALS
BODY MASS INDEX: 34.17 KG/M2 | SYSTOLIC BLOOD PRESSURE: 142 MMHG | DIASTOLIC BLOOD PRESSURE: 72 MMHG | HEART RATE: 95 BPM | TEMPERATURE: 98 F | OXYGEN SATURATION: 92 % | WEIGHT: 186.81 LBS | RESPIRATION RATE: 18 BRPM

## 2021-01-27 DIAGNOSIS — C34.91 NSCLC OF RIGHT LUNG: Primary | ICD-10-CM

## 2021-01-27 PROCEDURE — 99024 PR POST-OP FOLLOW-UP VISIT: ICD-10-PCS | Mod: S$GLB,,, | Performed by: RADIOLOGY

## 2021-01-27 PROCEDURE — 99999 PR PBB SHADOW E&M-EST. PATIENT-LVL IV: ICD-10-PCS | Mod: PBBFAC,,, | Performed by: RADIOLOGY

## 2021-01-27 PROCEDURE — 99024 POSTOP FOLLOW-UP VISIT: CPT | Mod: S$GLB,,, | Performed by: RADIOLOGY

## 2021-01-27 PROCEDURE — 1126F AMNT PAIN NOTED NONE PRSNT: CPT | Mod: S$GLB,,, | Performed by: RADIOLOGY

## 2021-01-27 PROCEDURE — 3008F PR BODY MASS INDEX (BMI) DOCUMENTED: ICD-10-PCS | Mod: CPTII,S$GLB,, | Performed by: RADIOLOGY

## 2021-01-27 PROCEDURE — 3008F BODY MASS INDEX DOCD: CPT | Mod: CPTII,S$GLB,, | Performed by: RADIOLOGY

## 2021-01-27 PROCEDURE — 99999 PR PBB SHADOW E&M-EST. PATIENT-LVL IV: CPT | Mod: PBBFAC,,, | Performed by: RADIOLOGY

## 2021-01-27 PROCEDURE — 1126F PR PAIN SEVERITY QUANTIFIED, NO PAIN PRESENT: ICD-10-PCS | Mod: S$GLB,,, | Performed by: RADIOLOGY

## 2021-02-03 ENCOUNTER — LAB VISIT (OUTPATIENT)
Dept: LAB | Facility: HOSPITAL | Age: 66
End: 2021-02-03
Attending: INTERNAL MEDICINE
Payer: MEDICARE

## 2021-02-03 DIAGNOSIS — C34.91 NSCLC OF RIGHT LUNG: ICD-10-CM

## 2021-02-03 DIAGNOSIS — Z00.6 EXAMINATION OF PARTICIPANT IN CLINICAL TRIAL: ICD-10-CM

## 2021-02-03 LAB
ALBUMIN SERPL BCP-MCNC: 3.5 G/DL (ref 3.5–5.2)
ALP SERPL-CCNC: 52 U/L (ref 55–135)
ALT SERPL W/O P-5'-P-CCNC: 12 U/L (ref 10–44)
ANION GAP SERPL CALC-SCNC: 13 MMOL/L (ref 8–16)
AST SERPL-CCNC: 17 U/L (ref 10–40)
BASOPHILS # BLD AUTO: 0.03 K/UL (ref 0–0.2)
BASOPHILS NFR BLD: 0.6 % (ref 0–1.9)
BILIRUB SERPL-MCNC: 0.3 MG/DL (ref 0.1–1)
BUN SERPL-MCNC: 11 MG/DL (ref 8–23)
CALCIUM SERPL-MCNC: 9.7 MG/DL (ref 8.7–10.5)
CHLORIDE SERPL-SCNC: 105 MMOL/L (ref 95–110)
CO2 SERPL-SCNC: 23 MMOL/L (ref 23–29)
CREAT SERPL-MCNC: 0.9 MG/DL (ref 0.5–1.4)
DIFFERENTIAL METHOD: ABNORMAL
EOSINOPHIL # BLD AUTO: 0.1 K/UL (ref 0–0.5)
EOSINOPHIL NFR BLD: 1.6 % (ref 0–8)
ERYTHROCYTE [DISTWIDTH] IN BLOOD BY AUTOMATED COUNT: 18.6 % (ref 11.5–14.5)
EST. GFR  (AFRICAN AMERICAN): >60 ML/MIN/1.73 M^2
EST. GFR  (NON AFRICAN AMERICAN): >60 ML/MIN/1.73 M^2
GLUCOSE SERPL-MCNC: 163 MG/DL (ref 70–110)
HCT VFR BLD AUTO: 34.2 % (ref 37–48.5)
HGB BLD-MCNC: 11.6 G/DL (ref 12–16)
IMM GRANULOCYTES # BLD AUTO: 0.03 K/UL (ref 0–0.04)
IMM GRANULOCYTES NFR BLD AUTO: 0.6 % (ref 0–0.5)
LYMPHOCYTES # BLD AUTO: 0.7 K/UL (ref 1–4.8)
LYMPHOCYTES NFR BLD: 14.8 % (ref 18–48)
MCH RBC QN AUTO: 30.4 PG (ref 27–31)
MCHC RBC AUTO-ENTMCNC: 33.9 G/DL (ref 32–36)
MCV RBC AUTO: 90 FL (ref 82–98)
MONOCYTES # BLD AUTO: 0.6 K/UL (ref 0.3–1)
MONOCYTES NFR BLD: 11.7 % (ref 4–15)
NEUTROPHILS # BLD AUTO: 3.4 K/UL (ref 1.8–7.7)
NEUTROPHILS NFR BLD: 70.7 % (ref 38–73)
NRBC BLD-RTO: 0 /100 WBC
PLATELET # BLD AUTO: 199 K/UL (ref 150–350)
PMV BLD AUTO: 11.4 FL (ref 9.2–12.9)
POTASSIUM SERPL-SCNC: 4.1 MMOL/L (ref 3.5–5.1)
PROT SERPL-MCNC: 7 G/DL (ref 6–8.4)
RBC # BLD AUTO: 3.82 M/UL (ref 4–5.4)
SODIUM SERPL-SCNC: 141 MMOL/L (ref 136–145)
WBC # BLD AUTO: 4.87 K/UL (ref 3.9–12.7)

## 2021-02-03 PROCEDURE — 80053 COMPREHEN METABOLIC PANEL: CPT | Mod: HCNC

## 2021-02-03 PROCEDURE — 36415 COLL VENOUS BLD VENIPUNCTURE: CPT | Mod: HCNC,Q1

## 2021-02-03 PROCEDURE — 85025 COMPLETE CBC W/AUTO DIFF WBC: CPT | Mod: HCNC,Q1

## 2021-02-04 ENCOUNTER — INFUSION (OUTPATIENT)
Dept: INFUSION THERAPY | Facility: HOSPITAL | Age: 66
End: 2021-02-04
Attending: INTERNAL MEDICINE
Payer: MEDICARE

## 2021-02-04 ENCOUNTER — OFFICE VISIT (OUTPATIENT)
Dept: HEMATOLOGY/ONCOLOGY | Facility: CLINIC | Age: 66
End: 2021-02-04
Payer: MEDICARE

## 2021-02-04 ENCOUNTER — RESEARCH ENCOUNTER (OUTPATIENT)
Dept: HEMATOLOGY/ONCOLOGY | Facility: CLINIC | Age: 66
End: 2021-02-04

## 2021-02-04 VITALS
SYSTOLIC BLOOD PRESSURE: 170 MMHG | HEIGHT: 62 IN | TEMPERATURE: 98 F | RESPIRATION RATE: 20 BRPM | OXYGEN SATURATION: 97 % | DIASTOLIC BLOOD PRESSURE: 81 MMHG | WEIGHT: 181.44 LBS | HEART RATE: 104 BPM | BODY MASS INDEX: 33.39 KG/M2

## 2021-02-04 VITALS
OXYGEN SATURATION: 97 % | RESPIRATION RATE: 18 BRPM | TEMPERATURE: 98 F | HEART RATE: 82 BPM | DIASTOLIC BLOOD PRESSURE: 73 MMHG | SYSTOLIC BLOOD PRESSURE: 155 MMHG

## 2021-02-04 DIAGNOSIS — Z85.3 HISTORY OF BREAST CANCER IN FEMALE: ICD-10-CM

## 2021-02-04 DIAGNOSIS — D69.59 CHEMOTHERAPY-INDUCED THROMBOCYTOPENIA: ICD-10-CM

## 2021-02-04 DIAGNOSIS — C34.91 NSCLC OF RIGHT LUNG: Primary | ICD-10-CM

## 2021-02-04 DIAGNOSIS — R91.8 LUNG MASS: Primary | ICD-10-CM

## 2021-02-04 DIAGNOSIS — J41.0 SIMPLE CHRONIC BRONCHITIS: ICD-10-CM

## 2021-02-04 DIAGNOSIS — Z95.810 CARDIAC RESYNCHRONIZATION THERAPY DEFIBRILLATOR (CRT-D) IN PLACE: ICD-10-CM

## 2021-02-04 DIAGNOSIS — Z00.6 CLINICAL TRIAL PARTICIPANT: ICD-10-CM

## 2021-02-04 DIAGNOSIS — E11.9 TYPE 2 DIABETES MELLITUS WITHOUT COMPLICATION, WITHOUT LONG-TERM CURRENT USE OF INSULIN: ICD-10-CM

## 2021-02-04 DIAGNOSIS — D70.1 CHEMOTHERAPY INDUCED NEUTROPENIA: ICD-10-CM

## 2021-02-04 DIAGNOSIS — I10 ESSENTIAL HYPERTENSION: ICD-10-CM

## 2021-02-04 DIAGNOSIS — T45.1X5A CHEMOTHERAPY-INDUCED THROMBOCYTOPENIA: ICD-10-CM

## 2021-02-04 DIAGNOSIS — T45.1X5A CHEMOTHERAPY INDUCED NEUTROPENIA: ICD-10-CM

## 2021-02-04 DIAGNOSIS — D64.81 ANTINEOPLASTIC CHEMOTHERAPY INDUCED ANEMIA: ICD-10-CM

## 2021-02-04 DIAGNOSIS — I44.7 LEFT BUNDLE-BRANCH BLOCK: ICD-10-CM

## 2021-02-04 DIAGNOSIS — T45.1X5A ANTINEOPLASTIC CHEMOTHERAPY INDUCED ANEMIA: ICD-10-CM

## 2021-02-04 DIAGNOSIS — I42.0 DILATED CARDIOMYOPATHY: ICD-10-CM

## 2021-02-04 PROCEDURE — 63600175 PHARM REV CODE 636 W HCPCS: Mod: HCNC | Performed by: INTERNAL MEDICINE

## 2021-02-04 PROCEDURE — 96413 CHEMO IV INFUSION 1 HR: CPT | Mod: HCNC

## 2021-02-04 PROCEDURE — 99215 OFFICE O/P EST HI 40 MIN: CPT | Mod: S$PBB,HCNC,, | Performed by: INTERNAL MEDICINE

## 2021-02-04 PROCEDURE — 99999 PR PBB SHADOW E&M-EST. PATIENT-LVL V: ICD-10-PCS | Mod: PBBFAC,HCNC,, | Performed by: INTERNAL MEDICINE

## 2021-02-04 PROCEDURE — 25000003 PHARM REV CODE 250: Mod: HCNC,Q1 | Performed by: INTERNAL MEDICINE

## 2021-02-04 PROCEDURE — 99499 UNLISTED E&M SERVICE: CPT | Mod: S$GLB,,, | Performed by: INTERNAL MEDICINE

## 2021-02-04 PROCEDURE — 99499 RISK ADDL DX/OHS AUDIT: ICD-10-PCS | Mod: S$GLB,,, | Performed by: INTERNAL MEDICINE

## 2021-02-04 PROCEDURE — 99215 PR OFFICE/OUTPT VISIT, EST, LEVL V, 40-54 MIN: ICD-10-PCS | Mod: S$PBB,HCNC,, | Performed by: INTERNAL MEDICINE

## 2021-02-04 PROCEDURE — A4216 STERILE WATER/SALINE, 10 ML: HCPCS | Mod: HCNC | Performed by: INTERNAL MEDICINE

## 2021-02-04 PROCEDURE — 99999 PR PBB SHADOW E&M-EST. PATIENT-LVL V: CPT | Mod: PBBFAC,HCNC,, | Performed by: INTERNAL MEDICINE

## 2021-02-04 RX ORDER — EPINEPHRINE 0.3 MG/.3ML
0.3 INJECTION SUBCUTANEOUS ONCE AS NEEDED
Status: CANCELLED | OUTPATIENT
Start: 2021-02-04

## 2021-02-04 RX ORDER — HEPARIN 100 UNIT/ML
500 SYRINGE INTRAVENOUS
Status: CANCELLED | OUTPATIENT
Start: 2021-02-04

## 2021-02-04 RX ORDER — SODIUM CHLORIDE 0.9 % (FLUSH) 0.9 %
10 SYRINGE (ML) INJECTION
Status: CANCELLED | OUTPATIENT
Start: 2021-02-04

## 2021-02-04 RX ORDER — SODIUM CHLORIDE 0.9 % (FLUSH) 0.9 %
10 SYRINGE (ML) INJECTION
Status: DISCONTINUED | OUTPATIENT
Start: 2021-02-04 | End: 2021-02-04 | Stop reason: HOSPADM

## 2021-02-04 RX ORDER — HEPARIN 100 UNIT/ML
500 SYRINGE INTRAVENOUS
Status: DISCONTINUED | OUTPATIENT
Start: 2021-02-04 | End: 2021-02-04 | Stop reason: HOSPADM

## 2021-02-04 RX ORDER — LISINOPRIL 10 MG/1
10 TABLET ORAL DAILY
COMMUNITY
End: 2021-02-10

## 2021-02-04 RX ORDER — EPINEPHRINE 0.3 MG/.3ML
0.3 INJECTION SUBCUTANEOUS ONCE AS NEEDED
Status: DISCONTINUED | OUTPATIENT
Start: 2021-02-04 | End: 2021-02-04 | Stop reason: HOSPADM

## 2021-02-04 RX ORDER — DIPHENHYDRAMINE HYDROCHLORIDE 50 MG/ML
50 INJECTION INTRAMUSCULAR; INTRAVENOUS ONCE AS NEEDED
Status: DISCONTINUED | OUTPATIENT
Start: 2021-02-04 | End: 2021-02-04 | Stop reason: HOSPADM

## 2021-02-04 RX ORDER — DIPHENHYDRAMINE HYDROCHLORIDE 50 MG/ML
50 INJECTION INTRAMUSCULAR; INTRAVENOUS ONCE AS NEEDED
Status: CANCELLED | OUTPATIENT
Start: 2021-02-04

## 2021-02-04 RX ADMIN — Medication 10 ML: at 01:02

## 2021-02-04 RX ADMIN — SODIUM CHLORIDE: 0.9 INJECTION, SOLUTION INTRAVENOUS at 11:02

## 2021-02-04 RX ADMIN — HEPARIN 500 UNITS: 100 SYRINGE at 01:02

## 2021-02-10 ENCOUNTER — PATIENT MESSAGE (OUTPATIENT)
Dept: FAMILY MEDICINE | Facility: CLINIC | Age: 66
End: 2021-02-10

## 2021-02-10 DIAGNOSIS — I10 BENIGN ESSENTIAL HTN: Primary | ICD-10-CM

## 2021-02-10 RX ORDER — LOSARTAN POTASSIUM 50 MG/1
50 TABLET ORAL DAILY
Qty: 90 TABLET | Refills: 1 | Status: SHIPPED | OUTPATIENT
Start: 2021-02-10 | End: 2021-03-22

## 2021-02-11 DIAGNOSIS — Z00.6 CLINICAL TRIAL PARTICIPANT: ICD-10-CM

## 2021-02-11 DIAGNOSIS — C34.90 MALIGNANT NEOPLASM OF UNSPECIFIED PART OF UNSPECIFIED BRONCHUS OR LUNG: ICD-10-CM

## 2021-02-11 DIAGNOSIS — C34.91 NSCLC OF RIGHT LUNG: Primary | ICD-10-CM

## 2021-02-14 ENCOUNTER — PATIENT MESSAGE (OUTPATIENT)
Dept: FAMILY MEDICINE | Facility: CLINIC | Age: 66
End: 2021-02-14

## 2021-02-15 PROBLEM — Z13.9 ENCOUNTER FOR SCREENING: Status: RESOLVED | Noted: 2020-11-11 | Resolved: 2021-02-15

## 2021-02-17 ENCOUNTER — PATIENT MESSAGE (OUTPATIENT)
Dept: FAMILY MEDICINE | Facility: CLINIC | Age: 66
End: 2021-02-17

## 2021-02-17 DIAGNOSIS — I10 BENIGN ESSENTIAL HTN: Primary | ICD-10-CM

## 2021-02-17 DIAGNOSIS — E11.9 TYPE 2 DIABETES MELLITUS WITHOUT COMPLICATION: ICD-10-CM

## 2021-02-17 RX ORDER — AMLODIPINE BESYLATE 5 MG/1
5 TABLET ORAL DAILY
Qty: 30 TABLET | Refills: 1 | Status: SHIPPED | OUTPATIENT
Start: 2021-02-17 | End: 2021-02-25 | Stop reason: SDUPTHER

## 2021-02-18 ENCOUNTER — RESEARCH ENCOUNTER (OUTPATIENT)
Dept: HEMATOLOGY/ONCOLOGY | Facility: CLINIC | Age: 66
End: 2021-02-18

## 2021-02-18 ENCOUNTER — PATIENT MESSAGE (OUTPATIENT)
Dept: FAMILY MEDICINE | Facility: CLINIC | Age: 66
End: 2021-02-18

## 2021-02-19 ENCOUNTER — CLINICAL SUPPORT (OUTPATIENT)
Dept: FAMILY MEDICINE | Facility: CLINIC | Age: 66
End: 2021-02-19
Payer: MEDICARE

## 2021-02-19 VITALS — DIASTOLIC BLOOD PRESSURE: 80 MMHG | HEART RATE: 92 BPM | SYSTOLIC BLOOD PRESSURE: 132 MMHG

## 2021-02-19 DIAGNOSIS — Z01.30 BP CHECK: Primary | ICD-10-CM

## 2021-02-19 PROCEDURE — 99999 PR PBB SHADOW E&M-EST. PATIENT-LVL II: CPT | Mod: PBBFAC,HCNC,,

## 2021-02-19 PROCEDURE — 99499 UNLISTED E&M SERVICE: CPT | Mod: HCNC,S$GLB,, | Performed by: FAMILY MEDICINE

## 2021-02-19 PROCEDURE — 99499 NO LOS: ICD-10-PCS | Mod: HCNC,S$GLB,, | Performed by: FAMILY MEDICINE

## 2021-02-19 PROCEDURE — 99999 PR PBB SHADOW E&M-EST. PATIENT-LVL II: ICD-10-PCS | Mod: PBBFAC,HCNC,,

## 2021-02-25 ENCOUNTER — PATIENT MESSAGE (OUTPATIENT)
Dept: ELECTROPHYSIOLOGY | Facility: CLINIC | Age: 66
End: 2021-02-25

## 2021-03-03 ENCOUNTER — LAB VISIT (OUTPATIENT)
Dept: LAB | Facility: HOSPITAL | Age: 66
End: 2021-03-03
Attending: INTERNAL MEDICINE
Payer: MEDICARE

## 2021-03-03 DIAGNOSIS — Z00.6 EXAMINATION OF PARTICIPANT IN CLINICAL TRIAL: ICD-10-CM

## 2021-03-03 DIAGNOSIS — C34.91 NSCLC OF RIGHT LUNG: ICD-10-CM

## 2021-03-03 LAB
ALBUMIN SERPL BCP-MCNC: 3.6 G/DL (ref 3.5–5.2)
ALP SERPL-CCNC: 66 U/L (ref 55–135)
ALT SERPL W/O P-5'-P-CCNC: 23 U/L (ref 10–44)
ANION GAP SERPL CALC-SCNC: 12 MMOL/L (ref 8–16)
AST SERPL-CCNC: 25 U/L (ref 10–40)
BASOPHILS # BLD AUTO: 0.04 K/UL (ref 0–0.2)
BASOPHILS NFR BLD: 1 % (ref 0–1.9)
BILIRUB SERPL-MCNC: 0.5 MG/DL (ref 0.1–1)
BUN SERPL-MCNC: 10 MG/DL (ref 8–23)
CALCIUM SERPL-MCNC: 9.9 MG/DL (ref 8.7–10.5)
CHLORIDE SERPL-SCNC: 103 MMOL/L (ref 95–110)
CO2 SERPL-SCNC: 25 MMOL/L (ref 23–29)
CREAT SERPL-MCNC: 0.9 MG/DL (ref 0.5–1.4)
DIFFERENTIAL METHOD: ABNORMAL
EOSINOPHIL # BLD AUTO: 0 K/UL (ref 0–0.5)
EOSINOPHIL NFR BLD: 1 % (ref 0–8)
ERYTHROCYTE [DISTWIDTH] IN BLOOD BY AUTOMATED COUNT: 14.6 % (ref 11.5–14.5)
EST. GFR  (AFRICAN AMERICAN): >60 ML/MIN/1.73 M^2
EST. GFR  (NON AFRICAN AMERICAN): >60 ML/MIN/1.73 M^2
GLUCOSE SERPL-MCNC: 183 MG/DL (ref 70–110)
HCT VFR BLD AUTO: 38.6 % (ref 37–48.5)
HGB BLD-MCNC: 12.4 G/DL (ref 12–16)
IMM GRANULOCYTES # BLD AUTO: 0.02 K/UL (ref 0–0.04)
IMM GRANULOCYTES NFR BLD AUTO: 0.5 % (ref 0–0.5)
LYMPHOCYTES # BLD AUTO: 0.5 K/UL (ref 1–4.8)
LYMPHOCYTES NFR BLD: 13.7 % (ref 18–48)
MCH RBC QN AUTO: 29.6 PG (ref 27–31)
MCHC RBC AUTO-ENTMCNC: 32.1 G/DL (ref 32–36)
MCV RBC AUTO: 92 FL (ref 82–98)
MONOCYTES # BLD AUTO: 0.6 K/UL (ref 0.3–1)
MONOCYTES NFR BLD: 15.3 % (ref 4–15)
NEUTROPHILS # BLD AUTO: 2.7 K/UL (ref 1.8–7.7)
NEUTROPHILS NFR BLD: 68.5 % (ref 38–73)
NRBC BLD-RTO: 0 /100 WBC
PLATELET # BLD AUTO: 188 K/UL (ref 150–350)
PMV BLD AUTO: 10.9 FL (ref 9.2–12.9)
POTASSIUM SERPL-SCNC: 4.1 MMOL/L (ref 3.5–5.1)
PROT SERPL-MCNC: 7.6 G/DL (ref 6–8.4)
RBC # BLD AUTO: 4.19 M/UL (ref 4–5.4)
SODIUM SERPL-SCNC: 140 MMOL/L (ref 136–145)
WBC # BLD AUTO: 3.93 K/UL (ref 3.9–12.7)

## 2021-03-03 PROCEDURE — 85025 COMPLETE CBC W/AUTO DIFF WBC: CPT | Mod: HCNC,Q1 | Performed by: INTERNAL MEDICINE

## 2021-03-03 PROCEDURE — 36415 COLL VENOUS BLD VENIPUNCTURE: CPT | Mod: HCNC,Q1 | Performed by: INTERNAL MEDICINE

## 2021-03-03 PROCEDURE — 80053 COMPREHEN METABOLIC PANEL: CPT | Mod: HCNC | Performed by: INTERNAL MEDICINE

## 2021-03-04 ENCOUNTER — OFFICE VISIT (OUTPATIENT)
Dept: HEMATOLOGY/ONCOLOGY | Facility: CLINIC | Age: 66
End: 2021-03-04
Payer: MEDICARE

## 2021-03-04 ENCOUNTER — RESEARCH ENCOUNTER (OUTPATIENT)
Dept: RESEARCH | Facility: HOSPITAL | Age: 66
End: 2021-03-04

## 2021-03-04 ENCOUNTER — INFUSION (OUTPATIENT)
Dept: INFUSION THERAPY | Facility: HOSPITAL | Age: 66
End: 2021-03-04
Attending: INTERNAL MEDICINE
Payer: MEDICARE

## 2021-03-04 VITALS
HEIGHT: 62 IN | WEIGHT: 180.75 LBS | HEART RATE: 109 BPM | OXYGEN SATURATION: 94 % | BODY MASS INDEX: 33.26 KG/M2 | SYSTOLIC BLOOD PRESSURE: 134 MMHG | TEMPERATURE: 98 F | RESPIRATION RATE: 20 BRPM | DIASTOLIC BLOOD PRESSURE: 67 MMHG

## 2021-03-04 VITALS
OXYGEN SATURATION: 95 % | RESPIRATION RATE: 18 BRPM | HEART RATE: 80 BPM | SYSTOLIC BLOOD PRESSURE: 117 MMHG | TEMPERATURE: 98 F | DIASTOLIC BLOOD PRESSURE: 60 MMHG

## 2021-03-04 DIAGNOSIS — C34.90 MALIGNANT NEOPLASM OF UNSPECIFIED PART OF UNSPECIFIED BRONCHUS OR LUNG: Primary | ICD-10-CM

## 2021-03-04 DIAGNOSIS — C34.91 NSCLC OF RIGHT LUNG: ICD-10-CM

## 2021-03-04 DIAGNOSIS — Z00.6 CLINICAL TRIAL PARTICIPANT: ICD-10-CM

## 2021-03-04 DIAGNOSIS — R91.8 LUNG MASS: Primary | ICD-10-CM

## 2021-03-04 PROCEDURE — 25000003 PHARM REV CODE 250: Mod: HCNC,Q1 | Performed by: INTERNAL MEDICINE

## 2021-03-04 PROCEDURE — 99999 PR PBB SHADOW E&M-EST. PATIENT-LVL IV: CPT | Mod: PBBFAC,HCNC,, | Performed by: INTERNAL MEDICINE

## 2021-03-04 PROCEDURE — 99214 PR OFFICE/OUTPT VISIT, EST, LEVL IV, 30-39 MIN: ICD-10-PCS | Mod: S$PBB,HCNC,, | Performed by: INTERNAL MEDICINE

## 2021-03-04 PROCEDURE — 99214 OFFICE O/P EST MOD 30 MIN: CPT | Mod: S$PBB,HCNC,, | Performed by: INTERNAL MEDICINE

## 2021-03-04 PROCEDURE — 96413 CHEMO IV INFUSION 1 HR: CPT | Mod: HCNC

## 2021-03-04 PROCEDURE — 99999 PR PBB SHADOW E&M-EST. PATIENT-LVL IV: ICD-10-PCS | Mod: PBBFAC,HCNC,, | Performed by: INTERNAL MEDICINE

## 2021-03-04 PROCEDURE — 63600175 PHARM REV CODE 636 W HCPCS: Mod: HCNC,Q1 | Performed by: INTERNAL MEDICINE

## 2021-03-04 RX ORDER — SODIUM CHLORIDE 0.9 % (FLUSH) 0.9 %
10 SYRINGE (ML) INJECTION
Status: CANCELLED | OUTPATIENT
Start: 2021-03-04

## 2021-03-04 RX ORDER — HEPARIN 100 UNIT/ML
500 SYRINGE INTRAVENOUS
Status: DISCONTINUED | OUTPATIENT
Start: 2021-03-04 | End: 2021-03-04 | Stop reason: HOSPADM

## 2021-03-04 RX ORDER — EPINEPHRINE 0.3 MG/.3ML
0.3 INJECTION SUBCUTANEOUS ONCE AS NEEDED
Status: CANCELLED | OUTPATIENT
Start: 2021-03-04

## 2021-03-04 RX ORDER — DIPHENHYDRAMINE HYDROCHLORIDE 50 MG/ML
50 INJECTION INTRAMUSCULAR; INTRAVENOUS ONCE AS NEEDED
Status: CANCELLED | OUTPATIENT
Start: 2021-03-04

## 2021-03-04 RX ORDER — SODIUM CHLORIDE 0.9 % (FLUSH) 0.9 %
10 SYRINGE (ML) INJECTION
Status: DISCONTINUED | OUTPATIENT
Start: 2021-03-04 | End: 2021-03-04 | Stop reason: HOSPADM

## 2021-03-04 RX ORDER — EPINEPHRINE 0.3 MG/.3ML
0.3 INJECTION SUBCUTANEOUS ONCE AS NEEDED
Status: DISCONTINUED | OUTPATIENT
Start: 2021-03-04 | End: 2021-03-04 | Stop reason: HOSPADM

## 2021-03-04 RX ORDER — DIPHENHYDRAMINE HYDROCHLORIDE 50 MG/ML
50 INJECTION INTRAMUSCULAR; INTRAVENOUS ONCE AS NEEDED
Status: DISCONTINUED | OUTPATIENT
Start: 2021-03-04 | End: 2021-03-04 | Stop reason: HOSPADM

## 2021-03-04 RX ORDER — HEPARIN 100 UNIT/ML
500 SYRINGE INTRAVENOUS
Status: CANCELLED | OUTPATIENT
Start: 2021-03-04

## 2021-03-04 RX ADMIN — SODIUM CHLORIDE: 9 INJECTION, SOLUTION INTRAVENOUS at 11:03

## 2021-03-04 RX ADMIN — HEPARIN 500 UNITS: 100 SYRINGE at 12:03

## 2021-03-05 ENCOUNTER — PATIENT MESSAGE (OUTPATIENT)
Dept: FAMILY MEDICINE | Facility: CLINIC | Age: 66
End: 2021-03-05

## 2021-03-15 DIAGNOSIS — Z00.6 EXAMINATION OF PARTICIPANT IN CLINICAL TRIAL: ICD-10-CM

## 2021-03-15 DIAGNOSIS — C34.91 NSCLC OF RIGHT LUNG: Primary | ICD-10-CM

## 2021-03-22 ENCOUNTER — PATIENT MESSAGE (OUTPATIENT)
Dept: FAMILY MEDICINE | Facility: CLINIC | Age: 66
End: 2021-03-22

## 2021-03-22 DIAGNOSIS — I10 BENIGN ESSENTIAL HTN: Primary | ICD-10-CM

## 2021-03-22 RX ORDER — LOSARTAN POTASSIUM 100 MG/1
100 TABLET ORAL DAILY
Qty: 90 TABLET | Refills: 3 | Status: SHIPPED | OUTPATIENT
Start: 2021-03-22 | End: 2022-01-28

## 2021-03-25 ENCOUNTER — TELEPHONE (OUTPATIENT)
Dept: CARDIOLOGY | Facility: HOSPITAL | Age: 66
End: 2021-03-25

## 2021-03-30 ENCOUNTER — CLINICAL SUPPORT (OUTPATIENT)
Dept: CARDIOLOGY | Facility: HOSPITAL | Age: 66
End: 2021-03-30
Payer: MEDICARE

## 2021-03-30 DIAGNOSIS — I42.9 CARDIOMYOPATHY, UNSPECIFIED: ICD-10-CM

## 2021-03-30 DIAGNOSIS — Z95.810 PRESENCE OF AUTOMATIC (IMPLANTABLE) CARDIAC DEFIBRILLATOR: ICD-10-CM

## 2021-03-30 PROCEDURE — 93296 REM INTERROG EVL PM/IDS: CPT | Mod: HCNC | Performed by: INTERNAL MEDICINE

## 2021-03-30 PROCEDURE — 93295 CARDIAC DEVICE CHECK - REMOTE: ICD-10-PCS | Mod: HCNC,,, | Performed by: INTERNAL MEDICINE

## 2021-03-30 PROCEDURE — 93295 DEV INTERROG REMOTE 1/2/MLT: CPT | Mod: HCNC,,, | Performed by: INTERNAL MEDICINE

## 2021-03-31 ENCOUNTER — HOSPITAL ENCOUNTER (OUTPATIENT)
Dept: RADIOLOGY | Facility: HOSPITAL | Age: 66
Discharge: HOME | End: 2021-03-31
Attending: INTERNAL MEDICINE
Payer: MEDICARE

## 2021-03-31 DIAGNOSIS — C34.91 NSCLC OF RIGHT LUNG: ICD-10-CM

## 2021-03-31 DIAGNOSIS — Z00.6 CLINICAL TRIAL PARTICIPANT: ICD-10-CM

## 2021-03-31 DIAGNOSIS — C34.90 MALIGNANT NEOPLASM OF UNSPECIFIED PART OF UNSPECIFIED BRONCHUS OR LUNG: ICD-10-CM

## 2021-03-31 PROCEDURE — 71260 CT THORAX DX C+: CPT | Mod: TC,Q1

## 2021-03-31 PROCEDURE — 25500020 PHARM REV CODE 255: Mod: Q1 | Performed by: INTERNAL MEDICINE

## 2021-03-31 PROCEDURE — 71260 CT CHEST WITH CONTRAST: ICD-10-PCS | Mod: 26,Q1,, | Performed by: RADIOLOGY

## 2021-03-31 PROCEDURE — 71260 CT THORAX DX C+: CPT | Mod: 26,Q1,, | Performed by: RADIOLOGY

## 2021-03-31 RX ADMIN — IOHEXOL 75 ML: 350 INJECTION, SOLUTION INTRAVENOUS at 01:03

## 2021-04-01 ENCOUNTER — OFFICE VISIT (OUTPATIENT)
Dept: HEMATOLOGY/ONCOLOGY | Facility: CLINIC | Age: 66
End: 2021-04-01
Payer: MEDICARE

## 2021-04-01 ENCOUNTER — LAB VISIT (OUTPATIENT)
Dept: LAB | Facility: HOSPITAL | Age: 66
End: 2021-04-01
Attending: INTERNAL MEDICINE
Payer: MEDICARE

## 2021-04-01 ENCOUNTER — RESEARCH ENCOUNTER (OUTPATIENT)
Dept: RESEARCH | Facility: HOSPITAL | Age: 66
End: 2021-04-01

## 2021-04-01 VITALS
SYSTOLIC BLOOD PRESSURE: 143 MMHG | OXYGEN SATURATION: 91 % | HEIGHT: 62 IN | RESPIRATION RATE: 20 BRPM | DIASTOLIC BLOOD PRESSURE: 69 MMHG | BODY MASS INDEX: 32.7 KG/M2 | HEART RATE: 114 BPM | TEMPERATURE: 98 F | WEIGHT: 177.69 LBS

## 2021-04-01 DIAGNOSIS — Z00.6 CLINICAL TRIAL PARTICIPANT: ICD-10-CM

## 2021-04-01 DIAGNOSIS — J41.0 SIMPLE CHRONIC BRONCHITIS: ICD-10-CM

## 2021-04-01 DIAGNOSIS — Z00.6 EXAMINATION OF PARTICIPANT IN CLINICAL TRIAL: ICD-10-CM

## 2021-04-01 DIAGNOSIS — I10 ESSENTIAL HYPERTENSION: ICD-10-CM

## 2021-04-01 DIAGNOSIS — I42.0 DILATED CARDIOMYOPATHY: ICD-10-CM

## 2021-04-01 DIAGNOSIS — C34.91 NSCLC OF RIGHT LUNG: Primary | ICD-10-CM

## 2021-04-01 DIAGNOSIS — Z95.810 CARDIAC RESYNCHRONIZATION THERAPY DEFIBRILLATOR (CRT-D) IN PLACE: ICD-10-CM

## 2021-04-01 DIAGNOSIS — I44.7 LEFT BUNDLE-BRANCH BLOCK: ICD-10-CM

## 2021-04-01 DIAGNOSIS — J70.0 RADIATION PNEUMONITIS: ICD-10-CM

## 2021-04-01 DIAGNOSIS — E11.9 TYPE 2 DIABETES MELLITUS WITHOUT COMPLICATION, WITHOUT LONG-TERM CURRENT USE OF INSULIN: ICD-10-CM

## 2021-04-01 DIAGNOSIS — C34.91 NSCLC OF RIGHT LUNG: ICD-10-CM

## 2021-04-01 DIAGNOSIS — Z85.3 HISTORY OF BREAST CANCER IN FEMALE: ICD-10-CM

## 2021-04-01 LAB
DRUG STUDY SPECIMEN TYPE: 2
DRUG STUDY TEST NAME: NORMAL
DRUG STUDY TEST RESULT: NORMAL

## 2021-04-01 PROCEDURE — 36415 COLL VENOUS BLD VENIPUNCTURE: CPT | Performed by: INTERNAL MEDICINE

## 2021-04-01 PROCEDURE — 99215 PR OFFICE/OUTPT VISIT, EST, LEVL V, 40-54 MIN: ICD-10-PCS | Mod: Q1,S$GLB,, | Performed by: INTERNAL MEDICINE

## 2021-04-01 PROCEDURE — 99999 PR PBB SHADOW E&M-EST. PATIENT-LVL IV: ICD-10-PCS | Mod: PBBFAC,,, | Performed by: INTERNAL MEDICINE

## 2021-04-01 PROCEDURE — 3051F HG A1C>EQUAL 7.0%<8.0%: CPT | Mod: Q1,CPTII,S$GLB, | Performed by: INTERNAL MEDICINE

## 2021-04-01 PROCEDURE — 99999 PR PBB SHADOW E&M-EST. PATIENT-LVL IV: CPT | Mod: PBBFAC,,, | Performed by: INTERNAL MEDICINE

## 2021-04-01 PROCEDURE — 3051F PR MOST RECENT HEMOGLOBIN A1C LEVEL 7.0 - < 8.0%: ICD-10-PCS | Mod: Q1,CPTII,S$GLB, | Performed by: INTERNAL MEDICINE

## 2021-04-01 PROCEDURE — 99000 SPECIMEN HANDLING OFFICE-LAB: CPT | Performed by: INTERNAL MEDICINE

## 2021-04-01 PROCEDURE — 99215 OFFICE O/P EST HI 40 MIN: CPT | Mod: Q1,S$GLB,, | Performed by: INTERNAL MEDICINE

## 2021-04-01 RX ORDER — PREDNISONE 10 MG/1
TABLET ORAL
Qty: 40 TABLET | Refills: 0 | Status: SHIPPED | OUTPATIENT
Start: 2021-04-01 | End: 2021-04-15 | Stop reason: SDUPTHER

## 2021-04-06 ENCOUNTER — PATIENT MESSAGE (OUTPATIENT)
Dept: ADMINISTRATIVE | Facility: HOSPITAL | Age: 66
End: 2021-04-06

## 2021-04-09 ENCOUNTER — RESEARCH ENCOUNTER (OUTPATIENT)
Dept: RESEARCH | Facility: HOSPITAL | Age: 66
End: 2021-04-09

## 2021-04-12 ENCOUNTER — RESEARCH ENCOUNTER (OUTPATIENT)
Dept: RESEARCH | Facility: HOSPITAL | Age: 66
End: 2021-04-12

## 2021-04-14 ENCOUNTER — LAB VISIT (OUTPATIENT)
Dept: LAB | Facility: HOSPITAL | Age: 66
End: 2021-04-14
Attending: INTERNAL MEDICINE
Payer: MEDICARE

## 2021-04-14 DIAGNOSIS — C34.91 NSCLC OF RIGHT LUNG: ICD-10-CM

## 2021-04-14 DIAGNOSIS — Z00.6 EXAMINATION OF PARTICIPANT IN CLINICAL TRIAL: ICD-10-CM

## 2021-04-14 DIAGNOSIS — R68.89 OTHER GENERAL SYMPTOMS AND SIGNS: ICD-10-CM

## 2021-04-14 LAB
ALBUMIN SERPL BCP-MCNC: 3.5 G/DL (ref 3.5–5.2)
ALP SERPL-CCNC: 55 U/L (ref 55–135)
ALT SERPL W/O P-5'-P-CCNC: 24 U/L (ref 10–44)
ANION GAP SERPL CALC-SCNC: 12 MMOL/L (ref 8–16)
AST SERPL-CCNC: 17 U/L (ref 10–40)
BASOPHILS # BLD AUTO: 0.05 K/UL (ref 0–0.2)
BASOPHILS NFR BLD: 0.5 % (ref 0–1.9)
BILIRUB SERPL-MCNC: 0.3 MG/DL (ref 0.1–1)
BUN SERPL-MCNC: 18 MG/DL (ref 8–23)
CALCIUM SERPL-MCNC: 8.8 MG/DL (ref 8.7–10.5)
CHLORIDE SERPL-SCNC: 106 MMOL/L (ref 95–110)
CO2 SERPL-SCNC: 23 MMOL/L (ref 23–29)
CREAT SERPL-MCNC: 1 MG/DL (ref 0.5–1.4)
DIFFERENTIAL METHOD: ABNORMAL
EOSINOPHIL # BLD AUTO: 0.1 K/UL (ref 0–0.5)
EOSINOPHIL NFR BLD: 0.5 % (ref 0–8)
ERYTHROCYTE [DISTWIDTH] IN BLOOD BY AUTOMATED COUNT: 15.5 % (ref 11.5–14.5)
EST. GFR  (AFRICAN AMERICAN): >60 ML/MIN/1.73 M^2
EST. GFR  (NON AFRICAN AMERICAN): >60 ML/MIN/1.73 M^2
GLUCOSE SERPL-MCNC: 150 MG/DL (ref 70–110)
HCT VFR BLD AUTO: 41.3 % (ref 37–48.5)
HGB BLD-MCNC: 13 G/DL (ref 12–16)
IMM GRANULOCYTES # BLD AUTO: 0.12 K/UL (ref 0–0.04)
IMM GRANULOCYTES NFR BLD AUTO: 1.2 % (ref 0–0.5)
LYMPHOCYTES # BLD AUTO: 1.2 K/UL (ref 1–4.8)
LYMPHOCYTES NFR BLD: 12.5 % (ref 18–48)
MCH RBC QN AUTO: 27 PG (ref 27–31)
MCHC RBC AUTO-ENTMCNC: 31.5 G/DL (ref 32–36)
MCV RBC AUTO: 86 FL (ref 82–98)
MONOCYTES # BLD AUTO: 0.8 K/UL (ref 0.3–1)
MONOCYTES NFR BLD: 7.8 % (ref 4–15)
NEUTROPHILS # BLD AUTO: 7.7 K/UL (ref 1.8–7.7)
NEUTROPHILS NFR BLD: 77.5 % (ref 38–73)
NRBC BLD-RTO: 0 /100 WBC
PLATELET # BLD AUTO: 186 K/UL (ref 150–450)
PMV BLD AUTO: 10.6 FL (ref 9.2–12.9)
POTASSIUM SERPL-SCNC: 4.1 MMOL/L (ref 3.5–5.1)
PROT SERPL-MCNC: 6.9 G/DL (ref 6–8.4)
RBC # BLD AUTO: 4.81 M/UL (ref 4–5.4)
SODIUM SERPL-SCNC: 141 MMOL/L (ref 136–145)
TSH SERPL DL<=0.005 MIU/L-ACNC: 3.74 UIU/ML (ref 0.4–4)
WBC # BLD AUTO: 9.93 K/UL (ref 3.9–12.7)

## 2021-04-14 PROCEDURE — 36415 COLL VENOUS BLD VENIPUNCTURE: CPT | Mod: Q1 | Performed by: INTERNAL MEDICINE

## 2021-04-14 PROCEDURE — 84443 ASSAY THYROID STIM HORMONE: CPT | Mod: Q1 | Performed by: INTERNAL MEDICINE

## 2021-04-14 PROCEDURE — 80053 COMPREHEN METABOLIC PANEL: CPT | Performed by: INTERNAL MEDICINE

## 2021-04-14 PROCEDURE — 85025 COMPLETE CBC W/AUTO DIFF WBC: CPT | Performed by: INTERNAL MEDICINE

## 2021-04-15 ENCOUNTER — INFUSION (OUTPATIENT)
Dept: INFUSION THERAPY | Facility: HOSPITAL | Age: 66
End: 2021-04-15
Attending: INTERNAL MEDICINE
Payer: MEDICARE

## 2021-04-15 ENCOUNTER — RESEARCH ENCOUNTER (OUTPATIENT)
Dept: HEMATOLOGY/ONCOLOGY | Facility: CLINIC | Age: 66
End: 2021-04-15

## 2021-04-15 ENCOUNTER — OFFICE VISIT (OUTPATIENT)
Dept: HEMATOLOGY/ONCOLOGY | Facility: CLINIC | Age: 66
End: 2021-04-15
Payer: MEDICARE

## 2021-04-15 VITALS
TEMPERATURE: 97 F | HEIGHT: 62 IN | WEIGHT: 179 LBS | DIASTOLIC BLOOD PRESSURE: 73 MMHG | HEART RATE: 97 BPM | SYSTOLIC BLOOD PRESSURE: 143 MMHG | BODY MASS INDEX: 32.94 KG/M2 | OXYGEN SATURATION: 94 % | RESPIRATION RATE: 20 BRPM

## 2021-04-15 VITALS
DIASTOLIC BLOOD PRESSURE: 65 MMHG | RESPIRATION RATE: 18 BRPM | TEMPERATURE: 98 F | SYSTOLIC BLOOD PRESSURE: 117 MMHG | HEART RATE: 95 BPM

## 2021-04-15 DIAGNOSIS — I10 ESSENTIAL HYPERTENSION: ICD-10-CM

## 2021-04-15 DIAGNOSIS — J41.0 SIMPLE CHRONIC BRONCHITIS: ICD-10-CM

## 2021-04-15 DIAGNOSIS — E11.9 TYPE 2 DIABETES MELLITUS WITHOUT COMPLICATION, WITHOUT LONG-TERM CURRENT USE OF INSULIN: ICD-10-CM

## 2021-04-15 DIAGNOSIS — I44.7 LEFT BUNDLE-BRANCH BLOCK: ICD-10-CM

## 2021-04-15 DIAGNOSIS — R91.8 LUNG MASS: Primary | ICD-10-CM

## 2021-04-15 DIAGNOSIS — Z00.6 CLINICAL TRIAL PARTICIPANT: ICD-10-CM

## 2021-04-15 DIAGNOSIS — Z85.3 HISTORY OF BREAST CANCER IN FEMALE: ICD-10-CM

## 2021-04-15 DIAGNOSIS — I42.0 DILATED CARDIOMYOPATHY: ICD-10-CM

## 2021-04-15 DIAGNOSIS — Z95.810 CARDIAC RESYNCHRONIZATION THERAPY DEFIBRILLATOR (CRT-D) IN PLACE: ICD-10-CM

## 2021-04-15 DIAGNOSIS — C34.91 NSCLC OF RIGHT LUNG: Primary | ICD-10-CM

## 2021-04-15 DIAGNOSIS — J70.0 RADIATION PNEUMONITIS: ICD-10-CM

## 2021-04-15 PROCEDURE — 96413 CHEMO IV INFUSION 1 HR: CPT

## 2021-04-15 PROCEDURE — 3008F BODY MASS INDEX DOCD: CPT | Mod: CPTII,S$GLB,, | Performed by: INTERNAL MEDICINE

## 2021-04-15 PROCEDURE — 3051F PR MOST RECENT HEMOGLOBIN A1C LEVEL 7.0 - < 8.0%: ICD-10-PCS | Mod: CPTII,S$GLB,, | Performed by: INTERNAL MEDICINE

## 2021-04-15 PROCEDURE — 63600175 PHARM REV CODE 636 W HCPCS: Performed by: INTERNAL MEDICINE

## 2021-04-15 PROCEDURE — 99215 OFFICE O/P EST HI 40 MIN: CPT | Mod: S$GLB,,, | Performed by: INTERNAL MEDICINE

## 2021-04-15 PROCEDURE — A4216 STERILE WATER/SALINE, 10 ML: HCPCS | Performed by: INTERNAL MEDICINE

## 2021-04-15 PROCEDURE — 1126F PR PAIN SEVERITY QUANTIFIED, NO PAIN PRESENT: ICD-10-PCS | Mod: S$GLB,,, | Performed by: INTERNAL MEDICINE

## 2021-04-15 PROCEDURE — 99999 PR PBB SHADOW E&M-EST. PATIENT-LVL IV: ICD-10-PCS | Mod: PBBFAC,,, | Performed by: INTERNAL MEDICINE

## 2021-04-15 PROCEDURE — 99999 PR PBB SHADOW E&M-EST. PATIENT-LVL IV: CPT | Mod: PBBFAC,,, | Performed by: INTERNAL MEDICINE

## 2021-04-15 PROCEDURE — 3051F HG A1C>EQUAL 7.0%<8.0%: CPT | Mod: CPTII,S$GLB,, | Performed by: INTERNAL MEDICINE

## 2021-04-15 PROCEDURE — 1126F AMNT PAIN NOTED NONE PRSNT: CPT | Mod: S$GLB,,, | Performed by: INTERNAL MEDICINE

## 2021-04-15 PROCEDURE — 25000003 PHARM REV CODE 250: Performed by: INTERNAL MEDICINE

## 2021-04-15 PROCEDURE — 3008F PR BODY MASS INDEX (BMI) DOCUMENTED: ICD-10-PCS | Mod: CPTII,S$GLB,, | Performed by: INTERNAL MEDICINE

## 2021-04-15 PROCEDURE — 99215 PR OFFICE/OUTPT VISIT, EST, LEVL V, 40-54 MIN: ICD-10-PCS | Mod: S$GLB,,, | Performed by: INTERNAL MEDICINE

## 2021-04-15 RX ORDER — EPINEPHRINE 0.3 MG/.3ML
0.3 INJECTION SUBCUTANEOUS ONCE AS NEEDED
Status: DISCONTINUED | OUTPATIENT
Start: 2021-04-15 | End: 2021-04-15 | Stop reason: HOSPADM

## 2021-04-15 RX ORDER — DIPHENHYDRAMINE HYDROCHLORIDE 50 MG/ML
50 INJECTION INTRAMUSCULAR; INTRAVENOUS ONCE AS NEEDED
Status: DISCONTINUED | OUTPATIENT
Start: 2021-04-15 | End: 2021-04-15 | Stop reason: HOSPADM

## 2021-04-15 RX ORDER — HEPARIN 100 UNIT/ML
500 SYRINGE INTRAVENOUS
Status: CANCELLED | OUTPATIENT
Start: 2021-04-15

## 2021-04-15 RX ORDER — DIPHENHYDRAMINE HYDROCHLORIDE 50 MG/ML
50 INJECTION INTRAMUSCULAR; INTRAVENOUS ONCE AS NEEDED
Status: CANCELLED | OUTPATIENT
Start: 2021-04-15

## 2021-04-15 RX ORDER — PREDNISONE 10 MG/1
TABLET ORAL
Qty: 40 TABLET | Refills: 0 | Status: SHIPPED | OUTPATIENT
Start: 2021-04-15 | End: 2021-05-01

## 2021-04-15 RX ORDER — SODIUM CHLORIDE 0.9 % (FLUSH) 0.9 %
10 SYRINGE (ML) INJECTION
Status: CANCELLED | OUTPATIENT
Start: 2021-04-15

## 2021-04-15 RX ORDER — SODIUM CHLORIDE 0.9 % (FLUSH) 0.9 %
10 SYRINGE (ML) INJECTION
Status: DISCONTINUED | OUTPATIENT
Start: 2021-04-15 | End: 2021-04-15 | Stop reason: HOSPADM

## 2021-04-15 RX ORDER — EPINEPHRINE 0.3 MG/.3ML
0.3 INJECTION SUBCUTANEOUS ONCE AS NEEDED
Status: CANCELLED | OUTPATIENT
Start: 2021-04-15

## 2021-04-15 RX ORDER — HEPARIN 100 UNIT/ML
500 SYRINGE INTRAVENOUS
Status: DISCONTINUED | OUTPATIENT
Start: 2021-04-15 | End: 2021-04-15 | Stop reason: HOSPADM

## 2021-04-15 RX ADMIN — SODIUM CHLORIDE: 0.9 INJECTION, SOLUTION INTRAVENOUS at 09:04

## 2021-04-15 RX ADMIN — HEPARIN 500 UNITS: 100 SYRINGE at 11:04

## 2021-04-15 RX ADMIN — Medication 10 ML: at 11:04

## 2021-05-12 ENCOUNTER — LAB VISIT (OUTPATIENT)
Dept: LAB | Facility: HOSPITAL | Age: 66
End: 2021-05-12
Attending: INTERNAL MEDICINE
Payer: MEDICARE

## 2021-05-12 DIAGNOSIS — C34.91 NSCLC OF RIGHT LUNG: ICD-10-CM

## 2021-05-12 DIAGNOSIS — Z00.6 EXAMINATION OF PARTICIPANT IN CLINICAL TRIAL: ICD-10-CM

## 2021-05-12 DIAGNOSIS — E11.9 TYPE 2 DIABETES MELLITUS WITHOUT COMPLICATION: ICD-10-CM

## 2021-05-12 LAB
ALBUMIN SERPL BCP-MCNC: 3.5 G/DL (ref 3.5–5.2)
ALP SERPL-CCNC: 61 U/L (ref 55–135)
ALT SERPL W/O P-5'-P-CCNC: 13 U/L (ref 10–44)
ANION GAP SERPL CALC-SCNC: 9 MMOL/L (ref 8–16)
AST SERPL-CCNC: 13 U/L (ref 10–40)
BASOPHILS # BLD AUTO: 0.03 K/UL (ref 0–0.2)
BASOPHILS NFR BLD: 0.6 % (ref 0–1.9)
BILIRUB SERPL-MCNC: 0.4 MG/DL (ref 0.1–1)
BUN SERPL-MCNC: 13 MG/DL (ref 8–23)
CALCIUM SERPL-MCNC: 9.7 MG/DL (ref 8.7–10.5)
CHLORIDE SERPL-SCNC: 104 MMOL/L (ref 95–110)
CO2 SERPL-SCNC: 28 MMOL/L (ref 23–29)
CREAT SERPL-MCNC: 0.9 MG/DL (ref 0.5–1.4)
DIFFERENTIAL METHOD: ABNORMAL
EOSINOPHIL # BLD AUTO: 0 K/UL (ref 0–0.5)
EOSINOPHIL NFR BLD: 0.8 % (ref 0–8)
ERYTHROCYTE [DISTWIDTH] IN BLOOD BY AUTOMATED COUNT: 16.6 % (ref 11.5–14.5)
EST. GFR  (AFRICAN AMERICAN): >60 ML/MIN/1.73 M^2
EST. GFR  (NON AFRICAN AMERICAN): >60 ML/MIN/1.73 M^2
GLUCOSE SERPL-MCNC: 162 MG/DL (ref 70–110)
HCT VFR BLD AUTO: 38.3 % (ref 37–48.5)
HGB BLD-MCNC: 12.2 G/DL (ref 12–16)
IMM GRANULOCYTES # BLD AUTO: 0.04 K/UL (ref 0–0.04)
IMM GRANULOCYTES NFR BLD AUTO: 0.8 % (ref 0–0.5)
LYMPHOCYTES # BLD AUTO: 0.7 K/UL (ref 1–4.8)
LYMPHOCYTES NFR BLD: 14.9 % (ref 18–48)
MCH RBC QN AUTO: 27 PG (ref 27–31)
MCHC RBC AUTO-ENTMCNC: 31.9 G/DL (ref 32–36)
MCV RBC AUTO: 85 FL (ref 82–98)
MONOCYTES # BLD AUTO: 0.5 K/UL (ref 0.3–1)
MONOCYTES NFR BLD: 9.7 % (ref 4–15)
NEUTROPHILS # BLD AUTO: 3.5 K/UL (ref 1.8–7.7)
NEUTROPHILS NFR BLD: 73.2 % (ref 38–73)
NRBC BLD-RTO: 0 /100 WBC
PLATELET # BLD AUTO: 201 K/UL (ref 150–450)
PMV BLD AUTO: 10.5 FL (ref 9.2–12.9)
POTASSIUM SERPL-SCNC: 4.2 MMOL/L (ref 3.5–5.1)
PROT SERPL-MCNC: 7.3 G/DL (ref 6–8.4)
RBC # BLD AUTO: 4.52 M/UL (ref 4–5.4)
SODIUM SERPL-SCNC: 141 MMOL/L (ref 136–145)
WBC # BLD AUTO: 4.83 K/UL (ref 3.9–12.7)

## 2021-05-12 PROCEDURE — 36415 COLL VENOUS BLD VENIPUNCTURE: CPT | Mod: Q1 | Performed by: INTERNAL MEDICINE

## 2021-05-12 PROCEDURE — 80053 COMPREHEN METABOLIC PANEL: CPT | Mod: Q1 | Performed by: INTERNAL MEDICINE

## 2021-05-12 PROCEDURE — 85025 COMPLETE CBC W/AUTO DIFF WBC: CPT | Mod: Q1 | Performed by: INTERNAL MEDICINE

## 2021-05-13 ENCOUNTER — RESEARCH ENCOUNTER (OUTPATIENT)
Dept: RESEARCH | Facility: HOSPITAL | Age: 66
End: 2021-05-13

## 2021-05-13 ENCOUNTER — OFFICE VISIT (OUTPATIENT)
Dept: HEMATOLOGY/ONCOLOGY | Facility: CLINIC | Age: 66
End: 2021-05-13
Payer: MEDICARE

## 2021-05-13 ENCOUNTER — INFUSION (OUTPATIENT)
Dept: INFUSION THERAPY | Facility: HOSPITAL | Age: 66
End: 2021-05-13
Attending: INTERNAL MEDICINE
Payer: MEDICARE

## 2021-05-13 VITALS
HEIGHT: 62 IN | RESPIRATION RATE: 18 BRPM | BODY MASS INDEX: 33.34 KG/M2 | SYSTOLIC BLOOD PRESSURE: 129 MMHG | OXYGEN SATURATION: 96 % | HEART RATE: 84 BPM | WEIGHT: 181.19 LBS | TEMPERATURE: 98 F | DIASTOLIC BLOOD PRESSURE: 83 MMHG

## 2021-05-13 VITALS
HEART RATE: 75 BPM | SYSTOLIC BLOOD PRESSURE: 134 MMHG | DIASTOLIC BLOOD PRESSURE: 65 MMHG | TEMPERATURE: 98 F | RESPIRATION RATE: 17 BRPM

## 2021-05-13 DIAGNOSIS — J70.0 RADIATION PNEUMONITIS: ICD-10-CM

## 2021-05-13 DIAGNOSIS — I10 ESSENTIAL HYPERTENSION: ICD-10-CM

## 2021-05-13 DIAGNOSIS — Z95.810 CARDIAC RESYNCHRONIZATION THERAPY DEFIBRILLATOR (CRT-D) IN PLACE: ICD-10-CM

## 2021-05-13 DIAGNOSIS — E11.9 TYPE 2 DIABETES MELLITUS WITHOUT COMPLICATION, WITHOUT LONG-TERM CURRENT USE OF INSULIN: ICD-10-CM

## 2021-05-13 DIAGNOSIS — Z85.3 HISTORY OF BREAST CANCER IN FEMALE: ICD-10-CM

## 2021-05-13 DIAGNOSIS — Z00.6 CLINICAL TRIAL PARTICIPANT: ICD-10-CM

## 2021-05-13 DIAGNOSIS — R91.8 LUNG MASS: Primary | ICD-10-CM

## 2021-05-13 DIAGNOSIS — C34.91 NSCLC OF RIGHT LUNG: Primary | ICD-10-CM

## 2021-05-13 DIAGNOSIS — I42.0 DILATED CARDIOMYOPATHY: ICD-10-CM

## 2021-05-13 DIAGNOSIS — J41.0 SIMPLE CHRONIC BRONCHITIS: ICD-10-CM

## 2021-05-13 DIAGNOSIS — I44.7 LEFT BUNDLE-BRANCH BLOCK: ICD-10-CM

## 2021-05-13 PROCEDURE — 99214 OFFICE O/P EST MOD 30 MIN: CPT | Mod: Q1,S$GLB,, | Performed by: INTERNAL MEDICINE

## 2021-05-13 PROCEDURE — 99999 PR PBB SHADOW E&M-EST. PATIENT-LVL IV: CPT | Mod: PBBFAC,,, | Performed by: INTERNAL MEDICINE

## 2021-05-13 PROCEDURE — 99999 PR PBB SHADOW E&M-EST. PATIENT-LVL IV: ICD-10-PCS | Mod: PBBFAC,,, | Performed by: INTERNAL MEDICINE

## 2021-05-13 PROCEDURE — 99214 PR OFFICE/OUTPT VISIT, EST, LEVL IV, 30-39 MIN: ICD-10-PCS | Mod: Q1,S$GLB,, | Performed by: INTERNAL MEDICINE

## 2021-05-13 PROCEDURE — 96413 CHEMO IV INFUSION 1 HR: CPT | Mod: Q1

## 2021-05-13 PROCEDURE — 25000003 PHARM REV CODE 250: Mod: Q1 | Performed by: INTERNAL MEDICINE

## 2021-05-13 PROCEDURE — 63600175 PHARM REV CODE 636 W HCPCS: Performed by: INTERNAL MEDICINE

## 2021-05-13 PROCEDURE — 3051F HG A1C>EQUAL 7.0%<8.0%: CPT | Mod: Q1,CPTII,S$GLB, | Performed by: INTERNAL MEDICINE

## 2021-05-13 PROCEDURE — 3051F PR MOST RECENT HEMOGLOBIN A1C LEVEL 7.0 - < 8.0%: ICD-10-PCS | Mod: Q1,CPTII,S$GLB, | Performed by: INTERNAL MEDICINE

## 2021-05-13 RX ORDER — EPINEPHRINE 0.3 MG/.3ML
0.3 INJECTION SUBCUTANEOUS ONCE AS NEEDED
Status: DISCONTINUED | OUTPATIENT
Start: 2021-05-13 | End: 2021-05-13 | Stop reason: HOSPADM

## 2021-05-13 RX ORDER — SODIUM CHLORIDE 0.9 % (FLUSH) 0.9 %
10 SYRINGE (ML) INJECTION
Status: CANCELLED | OUTPATIENT
Start: 2021-05-13

## 2021-05-13 RX ORDER — SODIUM CHLORIDE 0.9 % (FLUSH) 0.9 %
10 SYRINGE (ML) INJECTION
Status: DISCONTINUED | OUTPATIENT
Start: 2021-05-13 | End: 2021-05-13 | Stop reason: HOSPADM

## 2021-05-13 RX ORDER — EPINEPHRINE 0.3 MG/.3ML
0.3 INJECTION SUBCUTANEOUS ONCE AS NEEDED
Status: CANCELLED | OUTPATIENT
Start: 2021-05-13

## 2021-05-13 RX ORDER — HEPARIN 100 UNIT/ML
500 SYRINGE INTRAVENOUS
Status: CANCELLED | OUTPATIENT
Start: 2021-05-13

## 2021-05-13 RX ORDER — DIPHENHYDRAMINE HYDROCHLORIDE 50 MG/ML
50 INJECTION INTRAMUSCULAR; INTRAVENOUS ONCE AS NEEDED
Status: DISCONTINUED | OUTPATIENT
Start: 2021-05-13 | End: 2021-05-13 | Stop reason: HOSPADM

## 2021-05-13 RX ORDER — HEPARIN 100 UNIT/ML
500 SYRINGE INTRAVENOUS
Status: DISCONTINUED | OUTPATIENT
Start: 2021-05-13 | End: 2021-05-13 | Stop reason: HOSPADM

## 2021-05-13 RX ORDER — DIPHENHYDRAMINE HYDROCHLORIDE 50 MG/ML
50 INJECTION INTRAMUSCULAR; INTRAVENOUS ONCE AS NEEDED
Status: CANCELLED | OUTPATIENT
Start: 2021-05-13

## 2021-05-13 RX ADMIN — HEPARIN 500 UNITS: 100 SYRINGE at 05:05

## 2021-05-13 RX ADMIN — SODIUM CHLORIDE: 0.9 INJECTION, SOLUTION INTRAVENOUS at 03:05

## 2021-06-09 ENCOUNTER — LAB VISIT (OUTPATIENT)
Dept: LAB | Facility: HOSPITAL | Age: 66
End: 2021-06-09
Attending: INTERNAL MEDICINE
Payer: MEDICARE

## 2021-06-09 DIAGNOSIS — C34.91 NSCLC OF RIGHT LUNG: ICD-10-CM

## 2021-06-09 DIAGNOSIS — Z00.6 EXAMINATION OF PARTICIPANT IN CLINICAL TRIAL: ICD-10-CM

## 2021-06-09 LAB
ALBUMIN SERPL BCP-MCNC: 3.5 G/DL (ref 3.5–5.2)
ALP SERPL-CCNC: 59 U/L (ref 55–135)
ALT SERPL W/O P-5'-P-CCNC: 14 U/L (ref 10–44)
ANION GAP SERPL CALC-SCNC: 12 MMOL/L (ref 8–16)
AST SERPL-CCNC: 14 U/L (ref 10–40)
BASOPHILS # BLD AUTO: 0.04 K/UL (ref 0–0.2)
BASOPHILS NFR BLD: 0.7 % (ref 0–1.9)
BILIRUB SERPL-MCNC: 0.4 MG/DL (ref 0.1–1)
BUN SERPL-MCNC: 15 MG/DL (ref 8–23)
CALCIUM SERPL-MCNC: 9.6 MG/DL (ref 8.7–10.5)
CHLORIDE SERPL-SCNC: 107 MMOL/L (ref 95–110)
CO2 SERPL-SCNC: 23 MMOL/L (ref 23–29)
CREAT SERPL-MCNC: 0.9 MG/DL (ref 0.5–1.4)
DIFFERENTIAL METHOD: ABNORMAL
EOSINOPHIL # BLD AUTO: 0.1 K/UL (ref 0–0.5)
EOSINOPHIL NFR BLD: 1.2 % (ref 0–8)
ERYTHROCYTE [DISTWIDTH] IN BLOOD BY AUTOMATED COUNT: 17.1 % (ref 11.5–14.5)
EST. GFR  (AFRICAN AMERICAN): >60 ML/MIN/1.73 M^2
EST. GFR  (NON AFRICAN AMERICAN): >60 ML/MIN/1.73 M^2
GLUCOSE SERPL-MCNC: 142 MG/DL (ref 70–110)
HCT VFR BLD AUTO: 39.1 % (ref 37–48.5)
HGB BLD-MCNC: 12.4 G/DL (ref 12–16)
IMM GRANULOCYTES # BLD AUTO: 0.04 K/UL (ref 0–0.04)
IMM GRANULOCYTES NFR BLD AUTO: 0.7 % (ref 0–0.5)
LYMPHOCYTES # BLD AUTO: 0.9 K/UL (ref 1–4.8)
LYMPHOCYTES NFR BLD: 15.4 % (ref 18–48)
MCH RBC QN AUTO: 26.7 PG (ref 27–31)
MCHC RBC AUTO-ENTMCNC: 31.7 G/DL (ref 32–36)
MCV RBC AUTO: 84 FL (ref 82–98)
MONOCYTES # BLD AUTO: 0.6 K/UL (ref 0.3–1)
MONOCYTES NFR BLD: 10.3 % (ref 4–15)
NEUTROPHILS # BLD AUTO: 4.2 K/UL (ref 1.8–7.7)
NEUTROPHILS NFR BLD: 71.7 % (ref 38–73)
NRBC BLD-RTO: 0 /100 WBC
PLATELET # BLD AUTO: 208 K/UL (ref 150–450)
PMV BLD AUTO: 11 FL (ref 9.2–12.9)
POTASSIUM SERPL-SCNC: 4.9 MMOL/L (ref 3.5–5.1)
PROT SERPL-MCNC: 7.2 G/DL (ref 6–8.4)
RBC # BLD AUTO: 4.64 M/UL (ref 4–5.4)
SODIUM SERPL-SCNC: 142 MMOL/L (ref 136–145)
WBC # BLD AUTO: 5.83 K/UL (ref 3.9–12.7)

## 2021-06-09 PROCEDURE — 80053 COMPREHEN METABOLIC PANEL: CPT | Performed by: INTERNAL MEDICINE

## 2021-06-09 PROCEDURE — 85025 COMPLETE CBC W/AUTO DIFF WBC: CPT | Performed by: INTERNAL MEDICINE

## 2021-06-09 PROCEDURE — 36415 COLL VENOUS BLD VENIPUNCTURE: CPT | Performed by: INTERNAL MEDICINE

## 2021-06-10 ENCOUNTER — INFUSION (OUTPATIENT)
Dept: INFUSION THERAPY | Facility: HOSPITAL | Age: 66
End: 2021-06-10
Attending: INTERNAL MEDICINE
Payer: MEDICARE

## 2021-06-10 ENCOUNTER — OFFICE VISIT (OUTPATIENT)
Dept: HEMATOLOGY/ONCOLOGY | Facility: CLINIC | Age: 66
End: 2021-06-10
Payer: MEDICARE

## 2021-06-10 ENCOUNTER — RESEARCH ENCOUNTER (OUTPATIENT)
Dept: RESEARCH | Facility: HOSPITAL | Age: 66
End: 2021-06-10

## 2021-06-10 VITALS
RESPIRATION RATE: 20 BRPM | HEIGHT: 62 IN | TEMPERATURE: 98 F | SYSTOLIC BLOOD PRESSURE: 117 MMHG | OXYGEN SATURATION: 95 % | HEART RATE: 88 BPM | BODY MASS INDEX: 33.02 KG/M2 | WEIGHT: 179.44 LBS | DIASTOLIC BLOOD PRESSURE: 58 MMHG

## 2021-06-10 DIAGNOSIS — Z95.810 CARDIAC RESYNCHRONIZATION THERAPY DEFIBRILLATOR (CRT-D) IN PLACE: ICD-10-CM

## 2021-06-10 DIAGNOSIS — I10 ESSENTIAL HYPERTENSION: ICD-10-CM

## 2021-06-10 DIAGNOSIS — J70.0 RADIATION PNEUMONITIS: ICD-10-CM

## 2021-06-10 DIAGNOSIS — Z00.6 CLINICAL TRIAL PARTICIPANT: ICD-10-CM

## 2021-06-10 DIAGNOSIS — I42.0 DILATED CARDIOMYOPATHY: ICD-10-CM

## 2021-06-10 DIAGNOSIS — E11.9 TYPE 2 DIABETES MELLITUS WITHOUT COMPLICATION, WITHOUT LONG-TERM CURRENT USE OF INSULIN: ICD-10-CM

## 2021-06-10 DIAGNOSIS — C34.91 NSCLC OF RIGHT LUNG: Primary | ICD-10-CM

## 2021-06-10 DIAGNOSIS — R91.8 LUNG MASS: Primary | ICD-10-CM

## 2021-06-10 DIAGNOSIS — I44.7 LEFT BUNDLE-BRANCH BLOCK: ICD-10-CM

## 2021-06-10 DIAGNOSIS — Z85.3 HISTORY OF BREAST CANCER IN FEMALE: ICD-10-CM

## 2021-06-10 PROCEDURE — 3051F HG A1C>EQUAL 7.0%<8.0%: CPT | Mod: Q1,CPTII,S$GLB, | Performed by: INTERNAL MEDICINE

## 2021-06-10 PROCEDURE — 96413 CHEMO IV INFUSION 1 HR: CPT

## 2021-06-10 PROCEDURE — 3051F PR MOST RECENT HEMOGLOBIN A1C LEVEL 7.0 - < 8.0%: ICD-10-PCS | Mod: Q1,CPTII,S$GLB, | Performed by: INTERNAL MEDICINE

## 2021-06-10 PROCEDURE — 25000003 PHARM REV CODE 250: Mod: Q1 | Performed by: INTERNAL MEDICINE

## 2021-06-10 PROCEDURE — 99215 OFFICE O/P EST HI 40 MIN: CPT | Mod: Q1,S$GLB,, | Performed by: INTERNAL MEDICINE

## 2021-06-10 PROCEDURE — 99999 PR PBB SHADOW E&M-EST. PATIENT-LVL IV: CPT | Mod: PBBFAC,,, | Performed by: INTERNAL MEDICINE

## 2021-06-10 PROCEDURE — 99215 PR OFFICE/OUTPT VISIT, EST, LEVL V, 40-54 MIN: ICD-10-PCS | Mod: Q1,S$GLB,, | Performed by: INTERNAL MEDICINE

## 2021-06-10 PROCEDURE — 99999 PR PBB SHADOW E&M-EST. PATIENT-LVL IV: ICD-10-PCS | Mod: PBBFAC,,, | Performed by: INTERNAL MEDICINE

## 2021-06-10 RX ORDER — DIPHENHYDRAMINE HYDROCHLORIDE 50 MG/ML
50 INJECTION INTRAMUSCULAR; INTRAVENOUS ONCE AS NEEDED
Status: CANCELLED | OUTPATIENT
Start: 2021-06-10

## 2021-06-10 RX ORDER — EPINEPHRINE 0.3 MG/.3ML
0.3 INJECTION SUBCUTANEOUS ONCE AS NEEDED
Status: CANCELLED | OUTPATIENT
Start: 2021-06-10

## 2021-06-10 RX ORDER — EPINEPHRINE 0.3 MG/.3ML
0.3 INJECTION SUBCUTANEOUS ONCE AS NEEDED
Status: DISCONTINUED | OUTPATIENT
Start: 2021-06-10 | End: 2021-06-10 | Stop reason: HOSPADM

## 2021-06-10 RX ORDER — SODIUM CHLORIDE 0.9 % (FLUSH) 0.9 %
10 SYRINGE (ML) INJECTION
Status: DISCONTINUED | OUTPATIENT
Start: 2021-06-10 | End: 2021-06-10 | Stop reason: HOSPADM

## 2021-06-10 RX ORDER — HEPARIN 100 UNIT/ML
500 SYRINGE INTRAVENOUS
Status: CANCELLED | OUTPATIENT
Start: 2021-06-10

## 2021-06-10 RX ORDER — HEPARIN 100 UNIT/ML
500 SYRINGE INTRAVENOUS
Status: DISCONTINUED | OUTPATIENT
Start: 2021-06-10 | End: 2021-06-10 | Stop reason: HOSPADM

## 2021-06-10 RX ORDER — DIPHENHYDRAMINE HYDROCHLORIDE 50 MG/ML
50 INJECTION INTRAMUSCULAR; INTRAVENOUS ONCE AS NEEDED
Status: DISCONTINUED | OUTPATIENT
Start: 2021-06-10 | End: 2021-06-10 | Stop reason: HOSPADM

## 2021-06-10 RX ORDER — SODIUM CHLORIDE 0.9 % (FLUSH) 0.9 %
10 SYRINGE (ML) INJECTION
Status: CANCELLED | OUTPATIENT
Start: 2021-06-10

## 2021-06-10 RX ADMIN — SODIUM CHLORIDE: 0.9 INJECTION, SOLUTION INTRAVENOUS at 10:06

## 2021-06-24 ENCOUNTER — PATIENT MESSAGE (OUTPATIENT)
Dept: FAMILY MEDICINE | Facility: CLINIC | Age: 66
End: 2021-06-24

## 2021-06-28 ENCOUNTER — CLINICAL SUPPORT (OUTPATIENT)
Dept: CARDIOLOGY | Facility: HOSPITAL | Age: 66
End: 2021-06-28
Payer: MEDICARE

## 2021-06-28 DIAGNOSIS — Z95.810 PRESENCE OF AUTOMATIC (IMPLANTABLE) CARDIAC DEFIBRILLATOR: ICD-10-CM

## 2021-06-28 PROCEDURE — 93295 CARDIAC DEVICE CHECK - REMOTE: ICD-10-PCS | Mod: ,,, | Performed by: INTERNAL MEDICINE

## 2021-06-28 PROCEDURE — 93296 REM INTERROG EVL PM/IDS: CPT | Performed by: INTERNAL MEDICINE

## 2021-06-28 PROCEDURE — 93295 DEV INTERROG REMOTE 1/2/MLT: CPT | Mod: ,,, | Performed by: INTERNAL MEDICINE

## 2021-07-07 ENCOUNTER — HOSPITAL ENCOUNTER (OUTPATIENT)
Dept: RADIOLOGY | Facility: HOSPITAL | Age: 66
Discharge: HOME | End: 2021-07-07
Attending: INTERNAL MEDICINE
Payer: MEDICARE

## 2021-07-07 ENCOUNTER — PATIENT MESSAGE (OUTPATIENT)
Dept: ADMINISTRATIVE | Facility: HOSPITAL | Age: 66
End: 2021-07-07

## 2021-07-07 DIAGNOSIS — Z00.6 CLINICAL TRIAL PARTICIPANT: ICD-10-CM

## 2021-07-07 DIAGNOSIS — C34.91 NSCLC OF RIGHT LUNG: ICD-10-CM

## 2021-07-07 DIAGNOSIS — C34.90 MALIGNANT NEOPLASM OF UNSPECIFIED PART OF UNSPECIFIED BRONCHUS OR LUNG: ICD-10-CM

## 2021-07-07 PROCEDURE — 71260 CT CHEST WITH CONTRAST: ICD-10-PCS | Mod: 26,Q1,, | Performed by: RADIOLOGY

## 2021-07-07 PROCEDURE — 71260 CT THORAX DX C+: CPT | Mod: 26,Q1,, | Performed by: RADIOLOGY

## 2021-07-07 PROCEDURE — 25500020 PHARM REV CODE 255: Performed by: INTERNAL MEDICINE

## 2021-07-07 PROCEDURE — 71260 CT THORAX DX C+: CPT | Mod: TC

## 2021-07-07 RX ADMIN — IOHEXOL 75 ML: 350 INJECTION, SOLUTION INTRAVENOUS at 11:07

## 2021-07-08 ENCOUNTER — OFFICE VISIT (OUTPATIENT)
Dept: HEMATOLOGY/ONCOLOGY | Facility: CLINIC | Age: 66
End: 2021-07-08
Payer: MEDICARE

## 2021-07-08 ENCOUNTER — RESEARCH ENCOUNTER (OUTPATIENT)
Dept: RESEARCH | Facility: HOSPITAL | Age: 66
End: 2021-07-08

## 2021-07-08 ENCOUNTER — INFUSION (OUTPATIENT)
Dept: INFUSION THERAPY | Facility: HOSPITAL | Age: 66
End: 2021-07-08
Payer: MEDICARE

## 2021-07-08 VITALS
SYSTOLIC BLOOD PRESSURE: 117 MMHG | BODY MASS INDEX: 33.31 KG/M2 | DIASTOLIC BLOOD PRESSURE: 67 MMHG | WEIGHT: 181 LBS | OXYGEN SATURATION: 95 % | TEMPERATURE: 98 F | HEIGHT: 62 IN | RESPIRATION RATE: 14 BRPM | HEART RATE: 95 BPM

## 2021-07-08 VITALS
HEART RATE: 86 BPM | TEMPERATURE: 98 F | SYSTOLIC BLOOD PRESSURE: 124 MMHG | RESPIRATION RATE: 18 BRPM | DIASTOLIC BLOOD PRESSURE: 60 MMHG

## 2021-07-08 DIAGNOSIS — Z95.810 CARDIAC RESYNCHRONIZATION THERAPY DEFIBRILLATOR (CRT-D) IN PLACE: ICD-10-CM

## 2021-07-08 DIAGNOSIS — C34.91 NSCLC OF RIGHT LUNG: Primary | ICD-10-CM

## 2021-07-08 DIAGNOSIS — R91.8 LUNG MASS: Primary | ICD-10-CM

## 2021-07-08 DIAGNOSIS — I44.7 LEFT BUNDLE-BRANCH BLOCK: ICD-10-CM

## 2021-07-08 DIAGNOSIS — I42.0 DILATED CARDIOMYOPATHY: ICD-10-CM

## 2021-07-08 DIAGNOSIS — J70.0 RADIATION PNEUMONITIS: ICD-10-CM

## 2021-07-08 DIAGNOSIS — E11.9 TYPE 2 DIABETES MELLITUS WITHOUT COMPLICATION, WITHOUT LONG-TERM CURRENT USE OF INSULIN: ICD-10-CM

## 2021-07-08 DIAGNOSIS — I10 ESSENTIAL HYPERTENSION: ICD-10-CM

## 2021-07-08 DIAGNOSIS — Z85.3 HISTORY OF BREAST CANCER IN FEMALE: ICD-10-CM

## 2021-07-08 DIAGNOSIS — Z00.6 CLINICAL TRIAL PARTICIPANT: ICD-10-CM

## 2021-07-08 PROCEDURE — 99999 PR PBB SHADOW E&M-EST. PATIENT-LVL IV: ICD-10-PCS | Mod: PBBFAC,,, | Performed by: INTERNAL MEDICINE

## 2021-07-08 PROCEDURE — 3051F PR MOST RECENT HEMOGLOBIN A1C LEVEL 7.0 - < 8.0%: ICD-10-PCS | Mod: Q1,CPTII,S$GLB, | Performed by: INTERNAL MEDICINE

## 2021-07-08 PROCEDURE — 99215 OFFICE O/P EST HI 40 MIN: CPT | Mod: Q1,S$GLB,, | Performed by: INTERNAL MEDICINE

## 2021-07-08 PROCEDURE — 99999 PR PBB SHADOW E&M-EST. PATIENT-LVL IV: CPT | Mod: PBBFAC,,, | Performed by: INTERNAL MEDICINE

## 2021-07-08 PROCEDURE — 3051F HG A1C>EQUAL 7.0%<8.0%: CPT | Mod: Q1,CPTII,S$GLB, | Performed by: INTERNAL MEDICINE

## 2021-07-08 PROCEDURE — 99215 PR OFFICE/OUTPT VISIT, EST, LEVL V, 40-54 MIN: ICD-10-PCS | Mod: Q1,S$GLB,, | Performed by: INTERNAL MEDICINE

## 2021-07-08 PROCEDURE — 63600175 PHARM REV CODE 636 W HCPCS: Performed by: INTERNAL MEDICINE

## 2021-07-08 PROCEDURE — 25000003 PHARM REV CODE 250: Mod: Q1 | Performed by: INTERNAL MEDICINE

## 2021-07-08 RX ORDER — SODIUM CHLORIDE 0.9 % (FLUSH) 0.9 %
10 SYRINGE (ML) INJECTION
Status: DISCONTINUED | OUTPATIENT
Start: 2021-07-08 | End: 2021-07-08 | Stop reason: HOSPADM

## 2021-07-08 RX ORDER — EPINEPHRINE 0.3 MG/.3ML
0.3 INJECTION SUBCUTANEOUS ONCE AS NEEDED
Status: DISCONTINUED | OUTPATIENT
Start: 2021-07-08 | End: 2021-07-08 | Stop reason: HOSPADM

## 2021-07-08 RX ORDER — DIPHENHYDRAMINE HYDROCHLORIDE 50 MG/ML
50 INJECTION INTRAMUSCULAR; INTRAVENOUS ONCE AS NEEDED
Status: DISCONTINUED | OUTPATIENT
Start: 2021-07-08 | End: 2021-07-08 | Stop reason: HOSPADM

## 2021-07-08 RX ORDER — HEPARIN 100 UNIT/ML
500 SYRINGE INTRAVENOUS
Status: CANCELLED | OUTPATIENT
Start: 2021-07-08

## 2021-07-08 RX ORDER — HEPARIN 100 UNIT/ML
500 SYRINGE INTRAVENOUS
Status: DISCONTINUED | OUTPATIENT
Start: 2021-07-08 | End: 2021-07-08 | Stop reason: HOSPADM

## 2021-07-08 RX ORDER — SODIUM CHLORIDE 0.9 % (FLUSH) 0.9 %
10 SYRINGE (ML) INJECTION
Status: CANCELLED | OUTPATIENT
Start: 2021-07-08

## 2021-07-08 RX ORDER — DIPHENHYDRAMINE HYDROCHLORIDE 50 MG/ML
50 INJECTION INTRAMUSCULAR; INTRAVENOUS ONCE AS NEEDED
Status: CANCELLED | OUTPATIENT
Start: 2021-07-08

## 2021-07-08 RX ORDER — EPINEPHRINE 0.3 MG/.3ML
0.3 INJECTION SUBCUTANEOUS ONCE AS NEEDED
Status: CANCELLED | OUTPATIENT
Start: 2021-07-08

## 2021-07-08 RX ADMIN — HEPARIN 500 UNITS: 100 SYRINGE at 05:07

## 2021-07-08 RX ADMIN — SODIUM CHLORIDE: 0.9 INJECTION, SOLUTION INTRAVENOUS at 04:07

## 2021-07-09 ENCOUNTER — OFFICE VISIT (OUTPATIENT)
Dept: RADIATION ONCOLOGY | Facility: CLINIC | Age: 66
End: 2021-07-09
Payer: MEDICARE

## 2021-07-09 VITALS
OXYGEN SATURATION: 96 % | RESPIRATION RATE: 18 BRPM | BODY MASS INDEX: 33.47 KG/M2 | TEMPERATURE: 98 F | DIASTOLIC BLOOD PRESSURE: 80 MMHG | WEIGHT: 183 LBS | HEART RATE: 95 BPM | SYSTOLIC BLOOD PRESSURE: 169 MMHG

## 2021-07-09 DIAGNOSIS — C34.91 NSCLC OF RIGHT LUNG: Primary | ICD-10-CM

## 2021-07-09 PROCEDURE — 99213 PR OFFICE/OUTPT VISIT, EST, LEVL III, 20-29 MIN: ICD-10-PCS | Mod: S$GLB,,, | Performed by: RADIOLOGY

## 2021-07-09 PROCEDURE — 99999 PR PBB SHADOW E&M-EST. PATIENT-LVL III: CPT | Mod: PBBFAC,,, | Performed by: RADIOLOGY

## 2021-07-09 PROCEDURE — 99213 OFFICE O/P EST LOW 20 MIN: CPT | Mod: S$GLB,,, | Performed by: RADIOLOGY

## 2021-07-09 PROCEDURE — 3008F PR BODY MASS INDEX (BMI) DOCUMENTED: ICD-10-PCS | Mod: CPTII,S$GLB,, | Performed by: RADIOLOGY

## 2021-07-09 PROCEDURE — 99999 PR PBB SHADOW E&M-EST. PATIENT-LVL III: ICD-10-PCS | Mod: PBBFAC,,, | Performed by: RADIOLOGY

## 2021-07-09 PROCEDURE — 1126F PR PAIN SEVERITY QUANTIFIED, NO PAIN PRESENT: ICD-10-PCS | Mod: S$GLB,,, | Performed by: RADIOLOGY

## 2021-07-09 PROCEDURE — 1126F AMNT PAIN NOTED NONE PRSNT: CPT | Mod: S$GLB,,, | Performed by: RADIOLOGY

## 2021-07-09 PROCEDURE — 3008F BODY MASS INDEX DOCD: CPT | Mod: CPTII,S$GLB,, | Performed by: RADIOLOGY

## 2021-08-03 ENCOUNTER — PATIENT MESSAGE (OUTPATIENT)
Dept: FAMILY MEDICINE | Facility: CLINIC | Age: 66
End: 2021-08-03

## 2021-08-03 DIAGNOSIS — R73.9 HYPERGLYCEMIA: Primary | ICD-10-CM

## 2021-08-03 DIAGNOSIS — Z12.31 BREAST CANCER SCREENING BY MAMMOGRAM: Primary | ICD-10-CM

## 2021-08-04 ENCOUNTER — LAB VISIT (OUTPATIENT)
Dept: LAB | Facility: HOSPITAL | Age: 66
End: 2021-08-04
Attending: INTERNAL MEDICINE
Payer: MEDICARE

## 2021-08-04 ENCOUNTER — PATIENT MESSAGE (OUTPATIENT)
Dept: ADMINISTRATIVE | Facility: HOSPITAL | Age: 66
End: 2021-08-04

## 2021-08-04 DIAGNOSIS — E11.9 TYPE 2 DIABETES MELLITUS WITHOUT COMPLICATION: ICD-10-CM

## 2021-08-04 DIAGNOSIS — C34.91 NSCLC OF RIGHT LUNG: ICD-10-CM

## 2021-08-04 DIAGNOSIS — Z00.6 EXAMINATION OF PARTICIPANT IN CLINICAL TRIAL: ICD-10-CM

## 2021-08-04 LAB
ALBUMIN SERPL BCP-MCNC: 3.6 G/DL (ref 3.5–5.2)
ALP SERPL-CCNC: 73 U/L (ref 55–135)
ALT SERPL W/O P-5'-P-CCNC: 16 U/L (ref 10–44)
ANION GAP SERPL CALC-SCNC: 11 MMOL/L (ref 8–16)
AST SERPL-CCNC: 15 U/L (ref 10–40)
BASOPHILS # BLD AUTO: 0.06 K/UL (ref 0–0.2)
BASOPHILS NFR BLD: 1 % (ref 0–1.9)
BILIRUB SERPL-MCNC: 0.4 MG/DL (ref 0.1–1)
BUN SERPL-MCNC: 16 MG/DL (ref 8–23)
CALCIUM SERPL-MCNC: 10.2 MG/DL (ref 8.7–10.5)
CHLORIDE SERPL-SCNC: 107 MMOL/L (ref 95–110)
CO2 SERPL-SCNC: 22 MMOL/L (ref 23–29)
CREAT SERPL-MCNC: 1 MG/DL (ref 0.5–1.4)
DIFFERENTIAL METHOD: ABNORMAL
EOSINOPHIL # BLD AUTO: 0.1 K/UL (ref 0–0.5)
EOSINOPHIL NFR BLD: 1.5 % (ref 0–8)
ERYTHROCYTE [DISTWIDTH] IN BLOOD BY AUTOMATED COUNT: 15 % (ref 11.5–14.5)
EST. GFR  (AFRICAN AMERICAN): >60 ML/MIN/1.73 M^2
EST. GFR  (NON AFRICAN AMERICAN): >60 ML/MIN/1.73 M^2
ESTIMATED AVG GLUCOSE: 126 MG/DL (ref 68–131)
GLUCOSE SERPL-MCNC: 136 MG/DL (ref 70–110)
HBA1C MFR BLD: 6 % (ref 4–5.6)
HCT VFR BLD AUTO: 39.8 % (ref 37–48.5)
HGB BLD-MCNC: 12.5 G/DL (ref 12–16)
IMM GRANULOCYTES # BLD AUTO: 0.04 K/UL (ref 0–0.04)
IMM GRANULOCYTES NFR BLD AUTO: 0.7 % (ref 0–0.5)
LYMPHOCYTES # BLD AUTO: 1 K/UL (ref 1–4.8)
LYMPHOCYTES NFR BLD: 16.3 % (ref 18–48)
MCH RBC QN AUTO: 27.2 PG (ref 27–31)
MCHC RBC AUTO-ENTMCNC: 31.4 G/DL (ref 32–36)
MCV RBC AUTO: 87 FL (ref 82–98)
MONOCYTES # BLD AUTO: 0.6 K/UL (ref 0.3–1)
MONOCYTES NFR BLD: 9.8 % (ref 4–15)
NEUTROPHILS # BLD AUTO: 4.4 K/UL (ref 1.8–7.7)
NEUTROPHILS NFR BLD: 70.7 % (ref 38–73)
NRBC BLD-RTO: 0 /100 WBC
PLATELET # BLD AUTO: 220 K/UL (ref 150–450)
PMV BLD AUTO: 11.3 FL (ref 9.2–12.9)
POTASSIUM SERPL-SCNC: 4.6 MMOL/L (ref 3.5–5.1)
PROT SERPL-MCNC: 7.6 G/DL (ref 6–8.4)
RBC # BLD AUTO: 4.59 M/UL (ref 4–5.4)
SODIUM SERPL-SCNC: 140 MMOL/L (ref 136–145)
WBC # BLD AUTO: 6.14 K/UL (ref 3.9–12.7)

## 2021-08-04 PROCEDURE — 85025 COMPLETE CBC W/AUTO DIFF WBC: CPT | Performed by: INTERNAL MEDICINE

## 2021-08-04 PROCEDURE — 83036 HEMOGLOBIN GLYCOSYLATED A1C: CPT | Performed by: FAMILY MEDICINE

## 2021-08-04 PROCEDURE — 80053 COMPREHEN METABOLIC PANEL: CPT | Mod: Q1 | Performed by: INTERNAL MEDICINE

## 2021-08-04 PROCEDURE — 36415 COLL VENOUS BLD VENIPUNCTURE: CPT | Mod: Q1 | Performed by: INTERNAL MEDICINE

## 2021-08-05 ENCOUNTER — INFUSION (OUTPATIENT)
Dept: INFUSION THERAPY | Facility: HOSPITAL | Age: 66
End: 2021-08-05
Attending: INTERNAL MEDICINE
Payer: MEDICARE

## 2021-08-05 ENCOUNTER — RESEARCH ENCOUNTER (OUTPATIENT)
Dept: RESEARCH | Facility: HOSPITAL | Age: 66
End: 2021-08-05

## 2021-08-05 ENCOUNTER — OFFICE VISIT (OUTPATIENT)
Dept: HEMATOLOGY/ONCOLOGY | Facility: CLINIC | Age: 66
End: 2021-08-05
Payer: MEDICARE

## 2021-08-05 VITALS — HEART RATE: 81 BPM | SYSTOLIC BLOOD PRESSURE: 135 MMHG | RESPIRATION RATE: 18 BRPM | DIASTOLIC BLOOD PRESSURE: 67 MMHG

## 2021-08-05 VITALS
DIASTOLIC BLOOD PRESSURE: 64 MMHG | BODY MASS INDEX: 33.63 KG/M2 | SYSTOLIC BLOOD PRESSURE: 129 MMHG | WEIGHT: 182.75 LBS | HEART RATE: 95 BPM | TEMPERATURE: 99 F | RESPIRATION RATE: 20 BRPM | HEIGHT: 62 IN | OXYGEN SATURATION: 96 %

## 2021-08-05 DIAGNOSIS — I42.0 DILATED CARDIOMYOPATHY: ICD-10-CM

## 2021-08-05 DIAGNOSIS — C34.91 NSCLC OF RIGHT LUNG: Primary | ICD-10-CM

## 2021-08-05 DIAGNOSIS — Z00.6 EXAMINATION OF PARTICIPANT IN CLINICAL TRIAL: ICD-10-CM

## 2021-08-05 DIAGNOSIS — E11.9 TYPE 2 DIABETES MELLITUS WITHOUT COMPLICATION, WITHOUT LONG-TERM CURRENT USE OF INSULIN: ICD-10-CM

## 2021-08-05 DIAGNOSIS — Z00.6 CLINICAL TRIAL PARTICIPANT: ICD-10-CM

## 2021-08-05 DIAGNOSIS — C34.90 MALIGNANT NEOPLASM OF UNSPECIFIED PART OF UNSPECIFIED BRONCHUS OR LUNG: ICD-10-CM

## 2021-08-05 DIAGNOSIS — R91.8 LUNG MASS: Primary | ICD-10-CM

## 2021-08-05 DIAGNOSIS — I10 ESSENTIAL HYPERTENSION: ICD-10-CM

## 2021-08-05 DIAGNOSIS — Z79.899 ON ANTINEOPLASTIC CHEMOTHERAPY: ICD-10-CM

## 2021-08-05 DIAGNOSIS — R68.89 OTHER GENERAL SYMPTOMS AND SIGNS: ICD-10-CM

## 2021-08-05 DIAGNOSIS — J70.0 RADIATION PNEUMONITIS: ICD-10-CM

## 2021-08-05 DIAGNOSIS — I44.7 LEFT BUNDLE-BRANCH BLOCK: ICD-10-CM

## 2021-08-05 DIAGNOSIS — Z95.810 CARDIAC RESYNCHRONIZATION THERAPY DEFIBRILLATOR (CRT-D) IN PLACE: ICD-10-CM

## 2021-08-05 DIAGNOSIS — Z85.3 HISTORY OF BREAST CANCER IN FEMALE: ICD-10-CM

## 2021-08-05 PROCEDURE — 99999 PR PBB SHADOW E&M-EST. PATIENT-LVL IV: CPT | Mod: PBBFAC,,, | Performed by: INTERNAL MEDICINE

## 2021-08-05 PROCEDURE — 99499 UNLISTED E&M SERVICE: CPT | Mod: HCNC,S$GLB,, | Performed by: NURSE PRACTITIONER

## 2021-08-05 PROCEDURE — 99215 PR OFFICE/OUTPT VISIT, EST, LEVL V, 40-54 MIN: ICD-10-PCS | Mod: Q1,S$GLB,, | Performed by: INTERNAL MEDICINE

## 2021-08-05 PROCEDURE — 99215 OFFICE O/P EST HI 40 MIN: CPT | Mod: Q1,S$GLB,, | Performed by: INTERNAL MEDICINE

## 2021-08-05 PROCEDURE — 99999 PR PBB SHADOW E&M-EST. PATIENT-LVL IV: ICD-10-PCS | Mod: PBBFAC,,, | Performed by: INTERNAL MEDICINE

## 2021-08-05 PROCEDURE — 96413 CHEMO IV INFUSION 1 HR: CPT

## 2021-08-05 PROCEDURE — 63600175 PHARM REV CODE 636 W HCPCS: Mod: Q1 | Performed by: INTERNAL MEDICINE

## 2021-08-05 PROCEDURE — 99499 RISK ADDL DX/OHS AUDIT: ICD-10-PCS | Mod: HCNC,S$GLB,, | Performed by: NURSE PRACTITIONER

## 2021-08-05 PROCEDURE — A4216 STERILE WATER/SALINE, 10 ML: HCPCS | Performed by: INTERNAL MEDICINE

## 2021-08-05 PROCEDURE — 25000003 PHARM REV CODE 250: Performed by: INTERNAL MEDICINE

## 2021-08-05 RX ORDER — SODIUM CHLORIDE 0.9 % (FLUSH) 0.9 %
10 SYRINGE (ML) INJECTION
Status: CANCELLED | OUTPATIENT
Start: 2021-08-05

## 2021-08-05 RX ORDER — EPINEPHRINE 0.3 MG/.3ML
0.3 INJECTION SUBCUTANEOUS ONCE AS NEEDED
Status: DISCONTINUED | OUTPATIENT
Start: 2021-08-05 | End: 2021-08-05 | Stop reason: HOSPADM

## 2021-08-05 RX ORDER — DIPHENHYDRAMINE HYDROCHLORIDE 50 MG/ML
50 INJECTION INTRAMUSCULAR; INTRAVENOUS ONCE AS NEEDED
Status: DISCONTINUED | OUTPATIENT
Start: 2021-08-05 | End: 2021-08-05 | Stop reason: HOSPADM

## 2021-08-05 RX ORDER — SODIUM CHLORIDE 0.9 % (FLUSH) 0.9 %
10 SYRINGE (ML) INJECTION
Status: DISCONTINUED | OUTPATIENT
Start: 2021-08-05 | End: 2021-08-05 | Stop reason: HOSPADM

## 2021-08-05 RX ORDER — DIPHENHYDRAMINE HYDROCHLORIDE 50 MG/ML
50 INJECTION INTRAMUSCULAR; INTRAVENOUS ONCE AS NEEDED
Status: CANCELLED | OUTPATIENT
Start: 2021-08-05

## 2021-08-05 RX ORDER — EPINEPHRINE 0.3 MG/.3ML
0.3 INJECTION SUBCUTANEOUS ONCE AS NEEDED
Status: CANCELLED | OUTPATIENT
Start: 2021-08-05

## 2021-08-05 RX ORDER — HEPARIN 100 UNIT/ML
500 SYRINGE INTRAVENOUS
Status: DISCONTINUED | OUTPATIENT
Start: 2021-08-05 | End: 2021-08-05 | Stop reason: HOSPADM

## 2021-08-05 RX ORDER — HEPARIN 100 UNIT/ML
500 SYRINGE INTRAVENOUS
Status: CANCELLED | OUTPATIENT
Start: 2021-08-05

## 2021-08-05 RX ADMIN — HEPARIN 500 UNITS: 100 SYRINGE at 04:08

## 2021-08-05 RX ADMIN — Medication 10 ML: at 04:08

## 2021-08-05 RX ADMIN — SODIUM CHLORIDE: 0.9 INJECTION, SOLUTION INTRAVENOUS at 03:08

## 2021-08-31 ENCOUNTER — PATIENT MESSAGE (OUTPATIENT)
Dept: HEMATOLOGY/ONCOLOGY | Facility: CLINIC | Age: 66
End: 2021-08-31

## 2021-09-09 ENCOUNTER — HOSPITAL ENCOUNTER (OUTPATIENT)
Dept: RADIOLOGY | Facility: HOSPITAL | Age: 66
Discharge: HOME | End: 2021-09-09
Attending: INTERNAL MEDICINE
Payer: MEDICARE

## 2021-09-09 DIAGNOSIS — C34.91 NSCLC OF RIGHT LUNG: ICD-10-CM

## 2021-09-09 DIAGNOSIS — Z00.6 CLINICAL TRIAL PARTICIPANT: ICD-10-CM

## 2021-09-09 DIAGNOSIS — C34.90 MALIGNANT NEOPLASM OF UNSPECIFIED PART OF UNSPECIFIED BRONCHUS OR LUNG: ICD-10-CM

## 2021-09-09 LAB
CREAT SERPL-MCNC: 1.1 MG/DL (ref 0.5–1.4)
SAMPLE: NORMAL

## 2021-09-09 PROCEDURE — 25500020 PHARM REV CODE 255: Mod: Q1 | Performed by: INTERNAL MEDICINE

## 2021-09-09 PROCEDURE — 71260 CT CHEST WITH CONTRAST: ICD-10-PCS | Mod: 26,Q1,HCNC, | Performed by: RADIOLOGY

## 2021-09-09 PROCEDURE — 71260 CT THORAX DX C+: CPT | Mod: TC

## 2021-09-09 PROCEDURE — 71260 CT THORAX DX C+: CPT | Mod: 26,Q1,HCNC, | Performed by: RADIOLOGY

## 2021-09-09 RX ADMIN — IOHEXOL 75 ML: 350 INJECTION, SOLUTION INTRAVENOUS at 04:09

## 2021-09-10 ENCOUNTER — TELEPHONE (OUTPATIENT)
Dept: HEMATOLOGY/ONCOLOGY | Facility: CLINIC | Age: 66
End: 2021-09-10

## 2021-09-10 DIAGNOSIS — C34.91 NSCLC OF RIGHT LUNG: Primary | ICD-10-CM

## 2021-09-10 DIAGNOSIS — C34.81 MALIGNANT NEOPLASM OF OVERLAPPING SITES OF RIGHT BRONCHUS AND LUNG: ICD-10-CM

## 2021-09-10 DIAGNOSIS — Z00.6 EXAMINATION OF PARTICIPANT IN CLINICAL TRIAL: ICD-10-CM

## 2021-09-13 ENCOUNTER — RESEARCH ENCOUNTER (OUTPATIENT)
Dept: RESEARCH | Facility: HOSPITAL | Age: 66
End: 2021-09-13

## 2021-09-13 ENCOUNTER — OFFICE VISIT (OUTPATIENT)
Dept: HEMATOLOGY/ONCOLOGY | Facility: CLINIC | Age: 66
End: 2021-09-13
Payer: MEDICARE

## 2021-09-13 ENCOUNTER — LAB VISIT (OUTPATIENT)
Dept: LAB | Facility: HOSPITAL | Age: 66
End: 2021-09-13
Attending: INTERNAL MEDICINE
Payer: MEDICARE

## 2021-09-13 VITALS
WEIGHT: 185.19 LBS | OXYGEN SATURATION: 95 % | HEIGHT: 62 IN | DIASTOLIC BLOOD PRESSURE: 59 MMHG | HEART RATE: 77 BPM | BODY MASS INDEX: 34.08 KG/M2 | TEMPERATURE: 98 F | SYSTOLIC BLOOD PRESSURE: 124 MMHG | RESPIRATION RATE: 16 BRPM

## 2021-09-13 DIAGNOSIS — E11.9 TYPE 2 DIABETES MELLITUS WITHOUT COMPLICATION, WITHOUT LONG-TERM CURRENT USE OF INSULIN: ICD-10-CM

## 2021-09-13 DIAGNOSIS — I42.0 DILATED CARDIOMYOPATHY: ICD-10-CM

## 2021-09-13 DIAGNOSIS — Z85.3 HISTORY OF BREAST CANCER IN FEMALE: ICD-10-CM

## 2021-09-13 DIAGNOSIS — Z00.6 EXAMINATION OF PARTICIPANT IN CLINICAL TRIAL: ICD-10-CM

## 2021-09-13 DIAGNOSIS — J70.0 RADIATION PNEUMONITIS: ICD-10-CM

## 2021-09-13 DIAGNOSIS — C34.91 NSCLC OF RIGHT LUNG: ICD-10-CM

## 2021-09-13 DIAGNOSIS — I10 ESSENTIAL HYPERTENSION: ICD-10-CM

## 2021-09-13 DIAGNOSIS — Z00.6 CLINICAL TRIAL PARTICIPANT: ICD-10-CM

## 2021-09-13 DIAGNOSIS — C34.81 MALIGNANT NEOPLASM OF OVERLAPPING SITES OF RIGHT BRONCHUS AND LUNG: ICD-10-CM

## 2021-09-13 DIAGNOSIS — Z95.810 CARDIAC RESYNCHRONIZATION THERAPY DEFIBRILLATOR (CRT-D) IN PLACE: ICD-10-CM

## 2021-09-13 DIAGNOSIS — I44.7 LEFT BUNDLE-BRANCH BLOCK: ICD-10-CM

## 2021-09-13 DIAGNOSIS — C34.91 NSCLC OF RIGHT LUNG: Primary | ICD-10-CM

## 2021-09-13 LAB
ALBUMIN SERPL BCP-MCNC: 3.5 G/DL (ref 3.5–5.2)
ALP SERPL-CCNC: 64 U/L (ref 55–135)
ALT SERPL W/O P-5'-P-CCNC: 15 U/L (ref 10–44)
ANION GAP SERPL CALC-SCNC: 10 MMOL/L (ref 8–16)
AST SERPL-CCNC: 15 U/L (ref 10–40)
BASOPHILS # BLD AUTO: 0.05 K/UL (ref 0–0.2)
BASOPHILS NFR BLD: 1 % (ref 0–1.9)
BILIRUB SERPL-MCNC: 0.4 MG/DL (ref 0.1–1)
BUN SERPL-MCNC: 15 MG/DL (ref 8–23)
CALCIUM SERPL-MCNC: 9.6 MG/DL (ref 8.7–10.5)
CHLORIDE SERPL-SCNC: 107 MMOL/L (ref 95–110)
CO2 SERPL-SCNC: 23 MMOL/L (ref 23–29)
CREAT SERPL-MCNC: 0.8 MG/DL (ref 0.5–1.4)
DIFFERENTIAL METHOD: ABNORMAL
EOSINOPHIL # BLD AUTO: 0.1 K/UL (ref 0–0.5)
EOSINOPHIL NFR BLD: 1.9 % (ref 0–8)
ERYTHROCYTE [DISTWIDTH] IN BLOOD BY AUTOMATED COUNT: 15.1 % (ref 11.5–14.5)
EST. GFR  (AFRICAN AMERICAN): >60 ML/MIN/1.73 M^2
EST. GFR  (NON AFRICAN AMERICAN): >60 ML/MIN/1.73 M^2
GLUCOSE SERPL-MCNC: 121 MG/DL (ref 70–110)
HCT VFR BLD AUTO: 40.4 % (ref 37–48.5)
HGB BLD-MCNC: 12.5 G/DL (ref 12–16)
IMM GRANULOCYTES # BLD AUTO: 0.05 K/UL (ref 0–0.04)
IMM GRANULOCYTES NFR BLD AUTO: 1 % (ref 0–0.5)
LYMPHOCYTES # BLD AUTO: 0.8 K/UL (ref 1–4.8)
LYMPHOCYTES NFR BLD: 14.7 % (ref 18–48)
MCH RBC QN AUTO: 26.8 PG (ref 27–31)
MCHC RBC AUTO-ENTMCNC: 30.9 G/DL (ref 32–36)
MCV RBC AUTO: 87 FL (ref 82–98)
MONOCYTES # BLD AUTO: 0.5 K/UL (ref 0.3–1)
MONOCYTES NFR BLD: 9.7 % (ref 4–15)
NEUTROPHILS # BLD AUTO: 3.7 K/UL (ref 1.8–7.7)
NEUTROPHILS NFR BLD: 71.7 % (ref 38–73)
NRBC BLD-RTO: 0 /100 WBC
PLATELET # BLD AUTO: 213 K/UL (ref 150–450)
PMV BLD AUTO: 11.2 FL (ref 9.2–12.9)
POTASSIUM SERPL-SCNC: 4.8 MMOL/L (ref 3.5–5.1)
PROT SERPL-MCNC: 7.6 G/DL (ref 6–8.4)
RBC # BLD AUTO: 4.67 M/UL (ref 4–5.4)
SODIUM SERPL-SCNC: 140 MMOL/L (ref 136–145)
WBC # BLD AUTO: 5.17 K/UL (ref 3.9–12.7)

## 2021-09-13 PROCEDURE — 99214 PR OFFICE/OUTPT VISIT, EST, LEVL IV, 30-39 MIN: ICD-10-PCS | Mod: Q1,HCNC,S$GLB, | Performed by: INTERNAL MEDICINE

## 2021-09-13 PROCEDURE — 99499 RISK ADDL DX/OHS AUDIT: ICD-10-PCS | Mod: HCNC,S$GLB,, | Performed by: INTERNAL MEDICINE

## 2021-09-13 PROCEDURE — 85025 COMPLETE CBC W/AUTO DIFF WBC: CPT | Mod: HCNC,Q1 | Performed by: INTERNAL MEDICINE

## 2021-09-13 PROCEDURE — 36415 COLL VENOUS BLD VENIPUNCTURE: CPT | Mod: HCNC,Q1 | Performed by: INTERNAL MEDICINE

## 2021-09-13 PROCEDURE — 99499 UNLISTED E&M SERVICE: CPT | Mod: HCNC,S$GLB,, | Performed by: INTERNAL MEDICINE

## 2021-09-13 PROCEDURE — 99999 PR PBB SHADOW E&M-EST. PATIENT-LVL V: CPT | Mod: PBBFAC,HCNC,, | Performed by: INTERNAL MEDICINE

## 2021-09-13 PROCEDURE — 99214 OFFICE O/P EST MOD 30 MIN: CPT | Mod: Q1,HCNC,S$GLB, | Performed by: INTERNAL MEDICINE

## 2021-09-13 PROCEDURE — 80053 COMPREHEN METABOLIC PANEL: CPT | Mod: HCNC | Performed by: INTERNAL MEDICINE

## 2021-09-13 PROCEDURE — 99999 PR PBB SHADOW E&M-EST. PATIENT-LVL V: ICD-10-PCS | Mod: PBBFAC,HCNC,, | Performed by: INTERNAL MEDICINE

## 2021-09-13 RX ORDER — LORAZEPAM 1 MG/1
1 TABLET ORAL
Qty: 3 TABLET | Refills: 0 | Status: SHIPPED | OUTPATIENT
Start: 2021-09-13 | End: 2022-01-28

## 2021-09-14 ENCOUNTER — RESEARCH ENCOUNTER (OUTPATIENT)
Dept: RESEARCH | Facility: HOSPITAL | Age: 66
End: 2021-09-14

## 2021-09-14 ENCOUNTER — INFUSION (OUTPATIENT)
Dept: INFUSION THERAPY | Facility: HOSPITAL | Age: 66
End: 2021-09-14
Attending: INTERNAL MEDICINE
Payer: MEDICARE

## 2021-09-14 ENCOUNTER — HOSPITAL ENCOUNTER (OUTPATIENT)
Dept: RADIOLOGY | Facility: HOSPITAL | Age: 66
Discharge: HOME | End: 2021-09-14
Attending: INTERNAL MEDICINE
Payer: MEDICARE

## 2021-09-14 VITALS
RESPIRATION RATE: 18 BRPM | TEMPERATURE: 98 F | DIASTOLIC BLOOD PRESSURE: 67 MMHG | SYSTOLIC BLOOD PRESSURE: 132 MMHG | HEART RATE: 86 BPM | OXYGEN SATURATION: 97 %

## 2021-09-14 DIAGNOSIS — R91.8 LUNG MASS: Primary | ICD-10-CM

## 2021-09-14 DIAGNOSIS — C34.81 MALIGNANT NEOPLASM OF OVERLAPPING SITES OF RIGHT BRONCHUS AND LUNG: ICD-10-CM

## 2021-09-14 DIAGNOSIS — C34.91 NSCLC OF RIGHT LUNG: ICD-10-CM

## 2021-09-14 PROCEDURE — 78815 PET IMAGE W/CT SKULL-THIGH: CPT | Mod: 26,PS,Q1,HCNC | Performed by: RADIOLOGY

## 2021-09-14 PROCEDURE — 96413 CHEMO IV INFUSION 1 HR: CPT | Mod: HCNC,Q1

## 2021-09-14 PROCEDURE — A4216 STERILE WATER/SALINE, 10 ML: HCPCS | Mod: HCNC | Performed by: INTERNAL MEDICINE

## 2021-09-14 PROCEDURE — 78815 NM PET CT ROUTINE: ICD-10-PCS | Mod: 26,PS,Q1,HCNC | Performed by: RADIOLOGY

## 2021-09-14 PROCEDURE — 25000003 PHARM REV CODE 250: Mod: HCNC,Q1 | Performed by: INTERNAL MEDICINE

## 2021-09-14 PROCEDURE — 63600175 PHARM REV CODE 636 W HCPCS: Mod: HCNC,Q1 | Performed by: INTERNAL MEDICINE

## 2021-09-14 PROCEDURE — 78815 PET IMAGE W/CT SKULL-THIGH: CPT | Mod: TC,HCNC

## 2021-09-14 RX ORDER — EPINEPHRINE 0.3 MG/.3ML
0.3 INJECTION SUBCUTANEOUS ONCE AS NEEDED
Status: DISCONTINUED | OUTPATIENT
Start: 2021-09-14 | End: 2021-09-14 | Stop reason: HOSPADM

## 2021-09-14 RX ORDER — DIPHENHYDRAMINE HYDROCHLORIDE 50 MG/ML
50 INJECTION INTRAMUSCULAR; INTRAVENOUS ONCE AS NEEDED
Status: CANCELLED | OUTPATIENT
Start: 2021-09-14

## 2021-09-14 RX ORDER — DIPHENHYDRAMINE HYDROCHLORIDE 50 MG/ML
50 INJECTION INTRAMUSCULAR; INTRAVENOUS ONCE AS NEEDED
Status: DISCONTINUED | OUTPATIENT
Start: 2021-09-14 | End: 2021-09-14 | Stop reason: HOSPADM

## 2021-09-14 RX ORDER — SODIUM CHLORIDE 0.9 % (FLUSH) 0.9 %
10 SYRINGE (ML) INJECTION
Status: DISCONTINUED | OUTPATIENT
Start: 2021-09-14 | End: 2021-09-14 | Stop reason: HOSPADM

## 2021-09-14 RX ORDER — SODIUM CHLORIDE 0.9 % (FLUSH) 0.9 %
10 SYRINGE (ML) INJECTION
Status: CANCELLED | OUTPATIENT
Start: 2021-09-14

## 2021-09-14 RX ORDER — HEPARIN 100 UNIT/ML
500 SYRINGE INTRAVENOUS
Status: DISCONTINUED | OUTPATIENT
Start: 2021-09-14 | End: 2021-09-14 | Stop reason: HOSPADM

## 2021-09-14 RX ORDER — HEPARIN 100 UNIT/ML
500 SYRINGE INTRAVENOUS
Status: CANCELLED | OUTPATIENT
Start: 2021-09-14

## 2021-09-14 RX ORDER — EPINEPHRINE 0.3 MG/.3ML
0.3 INJECTION SUBCUTANEOUS ONCE AS NEEDED
Status: CANCELLED | OUTPATIENT
Start: 2021-09-14

## 2021-09-14 RX ADMIN — SODIUM CHLORIDE: 9 INJECTION, SOLUTION INTRAVENOUS at 05:09

## 2021-09-14 RX ADMIN — Medication 10 ML: at 06:09

## 2021-09-14 RX ADMIN — HEPARIN 500 UNITS: 100 SYRINGE at 06:09

## 2021-09-15 ENCOUNTER — OFFICE VISIT (OUTPATIENT)
Dept: FAMILY MEDICINE | Facility: CLINIC | Age: 66
End: 2021-09-15
Payer: MEDICARE

## 2021-09-15 ENCOUNTER — PATIENT MESSAGE (OUTPATIENT)
Dept: HEMATOLOGY/ONCOLOGY | Facility: CLINIC | Age: 66
End: 2021-09-15

## 2021-09-15 VITALS
HEIGHT: 62 IN | WEIGHT: 184.06 LBS | DIASTOLIC BLOOD PRESSURE: 86 MMHG | SYSTOLIC BLOOD PRESSURE: 130 MMHG | HEART RATE: 90 BPM | RESPIRATION RATE: 16 BRPM | OXYGEN SATURATION: 95 % | BODY MASS INDEX: 33.87 KG/M2

## 2021-09-15 DIAGNOSIS — I42.8 NICM (NONISCHEMIC CARDIOMYOPATHY): ICD-10-CM

## 2021-09-15 DIAGNOSIS — F32.9 REACTIVE DEPRESSION: ICD-10-CM

## 2021-09-15 DIAGNOSIS — I42.0 DILATED CARDIOMYOPATHY: ICD-10-CM

## 2021-09-15 DIAGNOSIS — J30.2 SEASONAL ALLERGIES: ICD-10-CM

## 2021-09-15 DIAGNOSIS — J41.0 SIMPLE CHRONIC BRONCHITIS: ICD-10-CM

## 2021-09-15 DIAGNOSIS — C34.91 NSCLC OF RIGHT LUNG: ICD-10-CM

## 2021-09-15 DIAGNOSIS — I10 ESSENTIAL HYPERTENSION: ICD-10-CM

## 2021-09-15 DIAGNOSIS — Z00.00 WELL WOMAN EXAM WITHOUT GYNECOLOGICAL EXAM: Primary | ICD-10-CM

## 2021-09-15 DIAGNOSIS — E11.9 TYPE 2 DIABETES MELLITUS WITHOUT COMPLICATION, WITHOUT LONG-TERM CURRENT USE OF INSULIN: ICD-10-CM

## 2021-09-15 DIAGNOSIS — E66.09 CLASS 1 OBESITY DUE TO EXCESS CALORIES WITH SERIOUS COMORBIDITY AND BODY MASS INDEX (BMI) OF 34.0 TO 34.9 IN ADULT: ICD-10-CM

## 2021-09-15 PROBLEM — E66.811 CLASS 1 OBESITY DUE TO EXCESS CALORIES WITH SERIOUS COMORBIDITY AND BODY MASS INDEX (BMI) OF 34.0 TO 34.9 IN ADULT: Status: ACTIVE | Noted: 2021-09-15

## 2021-09-15 PROCEDURE — 1160F RVW MEDS BY RX/DR IN RCRD: CPT | Mod: HCNC,CPTII,S$GLB, | Performed by: FAMILY MEDICINE

## 2021-09-15 PROCEDURE — 99396 PR PREVENTIVE VISIT,EST,40-64: ICD-10-PCS | Mod: HCNC,S$GLB,, | Performed by: FAMILY MEDICINE

## 2021-09-15 PROCEDURE — 4010F PR ACE/ARB THEARPY RXD/TAKEN: ICD-10-PCS | Mod: HCNC,CPTII,S$GLB, | Performed by: FAMILY MEDICINE

## 2021-09-15 PROCEDURE — 1160F PR REVIEW ALL MEDS BY PRESCRIBER/CLIN PHARMACIST DOCUMENTED: ICD-10-PCS | Mod: HCNC,CPTII,S$GLB, | Performed by: FAMILY MEDICINE

## 2021-09-15 PROCEDURE — 99999 PR PBB SHADOW E&M-EST. PATIENT-LVL III: ICD-10-PCS | Mod: PBBFAC,HCNC,, | Performed by: FAMILY MEDICINE

## 2021-09-15 PROCEDURE — 1159F PR MEDICATION LIST DOCUMENTED IN MEDICAL RECORD: ICD-10-PCS | Mod: HCNC,CPTII,S$GLB, | Performed by: FAMILY MEDICINE

## 2021-09-15 PROCEDURE — 3079F DIAST BP 80-89 MM HG: CPT | Mod: HCNC,CPTII,S$GLB, | Performed by: FAMILY MEDICINE

## 2021-09-15 PROCEDURE — 3008F PR BODY MASS INDEX (BMI) DOCUMENTED: ICD-10-PCS | Mod: HCNC,CPTII,S$GLB, | Performed by: FAMILY MEDICINE

## 2021-09-15 PROCEDURE — 3008F BODY MASS INDEX DOCD: CPT | Mod: HCNC,CPTII,S$GLB, | Performed by: FAMILY MEDICINE

## 2021-09-15 PROCEDURE — 3044F PR MOST RECENT HEMOGLOBIN A1C LEVEL <7.0%: ICD-10-PCS | Mod: HCNC,CPTII,S$GLB, | Performed by: FAMILY MEDICINE

## 2021-09-15 PROCEDURE — 99396 PREV VISIT EST AGE 40-64: CPT | Mod: HCNC,S$GLB,, | Performed by: FAMILY MEDICINE

## 2021-09-15 PROCEDURE — 3075F PR MOST RECENT SYSTOLIC BLOOD PRESS GE 130-139MM HG: ICD-10-PCS | Mod: HCNC,CPTII,S$GLB, | Performed by: FAMILY MEDICINE

## 2021-09-15 PROCEDURE — 99999 PR PBB SHADOW E&M-EST. PATIENT-LVL III: CPT | Mod: PBBFAC,HCNC,, | Performed by: FAMILY MEDICINE

## 2021-09-15 PROCEDURE — 4010F ACE/ARB THERAPY RXD/TAKEN: CPT | Mod: HCNC,CPTII,S$GLB, | Performed by: FAMILY MEDICINE

## 2021-09-15 PROCEDURE — 3044F HG A1C LEVEL LT 7.0%: CPT | Mod: HCNC,CPTII,S$GLB, | Performed by: FAMILY MEDICINE

## 2021-09-15 PROCEDURE — 3075F SYST BP GE 130 - 139MM HG: CPT | Mod: HCNC,CPTII,S$GLB, | Performed by: FAMILY MEDICINE

## 2021-09-15 PROCEDURE — 1159F MED LIST DOCD IN RCRD: CPT | Mod: HCNC,CPTII,S$GLB, | Performed by: FAMILY MEDICINE

## 2021-09-15 PROCEDURE — 3079F PR MOST RECENT DIASTOLIC BLOOD PRESSURE 80-89 MM HG: ICD-10-PCS | Mod: HCNC,CPTII,S$GLB, | Performed by: FAMILY MEDICINE

## 2021-09-16 ENCOUNTER — IMMUNIZATION (OUTPATIENT)
Dept: OBSTETRICS AND GYNECOLOGY | Facility: CLINIC | Age: 66
End: 2021-09-16
Payer: MEDICARE

## 2021-09-16 DIAGNOSIS — Z23 NEED FOR VACCINATION: Primary | ICD-10-CM

## 2021-09-16 PROCEDURE — 0003A COVID-19, MRNA, LNP-S, PF, 30 MCG/0.3 ML DOSE VACCINE: CPT | Mod: HCNC,CV19,, | Performed by: FAMILY MEDICINE

## 2021-09-16 PROCEDURE — 0003A COVID-19, MRNA, LNP-S, PF, 30 MCG/0.3 ML DOSE VACCINE: ICD-10-PCS | Mod: HCNC,CV19,, | Performed by: FAMILY MEDICINE

## 2021-09-16 PROCEDURE — 91300 COVID-19, MRNA, LNP-S, PF, 30 MCG/0.3 ML DOSE VACCINE: CPT | Mod: HCNC,,, | Performed by: FAMILY MEDICINE

## 2021-09-16 PROCEDURE — 91300 COVID-19, MRNA, LNP-S, PF, 30 MCG/0.3 ML DOSE VACCINE: ICD-10-PCS | Mod: HCNC,,, | Performed by: FAMILY MEDICINE

## 2021-09-24 ENCOUNTER — HOSPITAL ENCOUNTER (OUTPATIENT)
Dept: RADIOLOGY | Facility: HOSPITAL | Age: 66
Discharge: HOME | End: 2021-09-24
Attending: FAMILY MEDICINE
Payer: MEDICARE

## 2021-09-24 DIAGNOSIS — Z12.31 BREAST CANCER SCREENING BY MAMMOGRAM: ICD-10-CM

## 2021-09-24 PROCEDURE — 77063 BREAST TOMOSYNTHESIS BI: CPT | Mod: 26,HCNC,, | Performed by: RADIOLOGY

## 2021-09-24 PROCEDURE — 77067 MAMMO DIGITAL SCREENING BILAT WITH TOMO: ICD-10-PCS | Mod: 26,HCNC,, | Performed by: RADIOLOGY

## 2021-09-24 PROCEDURE — 77063 MAMMO DIGITAL SCREENING BILAT WITH TOMO: ICD-10-PCS | Mod: 26,HCNC,, | Performed by: RADIOLOGY

## 2021-09-24 PROCEDURE — 77067 SCR MAMMO BI INCL CAD: CPT | Mod: TC,HCNC

## 2021-09-24 PROCEDURE — 77067 SCR MAMMO BI INCL CAD: CPT | Mod: 26,HCNC,, | Performed by: RADIOLOGY

## 2021-09-26 ENCOUNTER — CLINICAL SUPPORT (OUTPATIENT)
Dept: CARDIOLOGY | Facility: HOSPITAL | Age: 66
End: 2021-09-26
Payer: MEDICARE

## 2021-09-26 DIAGNOSIS — Z95.810 PRESENCE OF AUTOMATIC (IMPLANTABLE) CARDIAC DEFIBRILLATOR: ICD-10-CM

## 2021-09-26 PROCEDURE — 93295 CARDIAC DEVICE CHECK - REMOTE: ICD-10-PCS | Mod: HCNC,,, | Performed by: INTERNAL MEDICINE

## 2021-09-26 PROCEDURE — 93295 DEV INTERROG REMOTE 1/2/MLT: CPT | Mod: HCNC,,, | Performed by: INTERNAL MEDICINE

## 2021-09-26 PROCEDURE — 93296 REM INTERROG EVL PM/IDS: CPT | Mod: HCNC | Performed by: INTERNAL MEDICINE

## 2021-09-27 ENCOUNTER — PATIENT MESSAGE (OUTPATIENT)
Dept: FAMILY MEDICINE | Facility: CLINIC | Age: 66
End: 2021-09-27

## 2021-09-30 ENCOUNTER — OFFICE VISIT (OUTPATIENT)
Dept: OPTOMETRY | Facility: CLINIC | Age: 66
End: 2021-09-30
Payer: MEDICARE

## 2021-09-30 DIAGNOSIS — E11.36 TYPE 2 DIABETES MELLITUS WITH DIABETIC CATARACT, WITHOUT LONG-TERM CURRENT USE OF INSULIN: Primary | ICD-10-CM

## 2021-09-30 DIAGNOSIS — H25.13 NUCLEAR SCLEROSIS OF BOTH EYES: ICD-10-CM

## 2021-09-30 DIAGNOSIS — H52.7 REFRACTIVE ERROR: ICD-10-CM

## 2021-09-30 LAB
LEFT EYE DM RETINOPATHY: NEGATIVE
RIGHT EYE DM RETINOPATHY: NEGATIVE

## 2021-09-30 PROCEDURE — 92014 COMPRE OPH EXAM EST PT 1/>: CPT | Mod: HCNC,S$GLB,, | Performed by: OPTOMETRIST

## 2021-09-30 PROCEDURE — 92014 PR EYE EXAM, EST PATIENT,COMPREHESV: ICD-10-PCS | Mod: HCNC,S$GLB,, | Performed by: OPTOMETRIST

## 2021-09-30 PROCEDURE — 1159F PR MEDICATION LIST DOCUMENTED IN MEDICAL RECORD: ICD-10-PCS | Mod: HCNC,CPTII,S$GLB, | Performed by: OPTOMETRIST

## 2021-09-30 PROCEDURE — 3044F HG A1C LEVEL LT 7.0%: CPT | Mod: HCNC,CPTII,S$GLB, | Performed by: OPTOMETRIST

## 2021-09-30 PROCEDURE — 4010F ACE/ARB THERAPY RXD/TAKEN: CPT | Mod: HCNC,CPTII,S$GLB, | Performed by: OPTOMETRIST

## 2021-09-30 PROCEDURE — 4010F PR ACE/ARB THEARPY RXD/TAKEN: ICD-10-PCS | Mod: HCNC,CPTII,S$GLB, | Performed by: OPTOMETRIST

## 2021-09-30 PROCEDURE — 99499 UNLISTED E&M SERVICE: CPT | Mod: HCNC,S$GLB,, | Performed by: OPTOMETRIST

## 2021-09-30 PROCEDURE — 92015 PR REFRACTION: ICD-10-PCS | Mod: HCNC,S$GLB,, | Performed by: OPTOMETRIST

## 2021-09-30 PROCEDURE — 1159F MED LIST DOCD IN RCRD: CPT | Mod: HCNC,CPTII,S$GLB, | Performed by: OPTOMETRIST

## 2021-09-30 PROCEDURE — 1160F RVW MEDS BY RX/DR IN RCRD: CPT | Mod: HCNC,CPTII,S$GLB, | Performed by: OPTOMETRIST

## 2021-09-30 PROCEDURE — 1160F PR REVIEW ALL MEDS BY PRESCRIBER/CLIN PHARMACIST DOCUMENTED: ICD-10-PCS | Mod: HCNC,CPTII,S$GLB, | Performed by: OPTOMETRIST

## 2021-09-30 PROCEDURE — 92015 DETERMINE REFRACTIVE STATE: CPT | Mod: HCNC,S$GLB,, | Performed by: OPTOMETRIST

## 2021-09-30 PROCEDURE — 99499 RISK ADDL DX/OHS AUDIT: ICD-10-PCS | Mod: HCNC,S$GLB,, | Performed by: OPTOMETRIST

## 2021-09-30 PROCEDURE — 3044F PR MOST RECENT HEMOGLOBIN A1C LEVEL <7.0%: ICD-10-PCS | Mod: HCNC,CPTII,S$GLB, | Performed by: OPTOMETRIST

## 2021-09-30 PROCEDURE — 99999 PR PBB SHADOW E&M-EST. PATIENT-LVL III: CPT | Mod: PBBFAC,HCNC,, | Performed by: OPTOMETRIST

## 2021-09-30 PROCEDURE — 99999 PR PBB SHADOW E&M-EST. PATIENT-LVL III: ICD-10-PCS | Mod: PBBFAC,HCNC,, | Performed by: OPTOMETRIST

## 2021-10-04 ENCOUNTER — PATIENT MESSAGE (OUTPATIENT)
Dept: ADMINISTRATIVE | Facility: HOSPITAL | Age: 66
End: 2021-10-04

## 2021-10-13 ENCOUNTER — LAB VISIT (OUTPATIENT)
Dept: LAB | Facility: HOSPITAL | Age: 66
End: 2021-10-13
Attending: INTERNAL MEDICINE
Payer: MEDICARE

## 2021-10-13 DIAGNOSIS — Z00.6 EXAMINATION OF PARTICIPANT IN CLINICAL TRIAL: ICD-10-CM

## 2021-10-13 DIAGNOSIS — C34.91 NSCLC OF RIGHT LUNG: ICD-10-CM

## 2021-10-13 DIAGNOSIS — Z79.899 ON ANTINEOPLASTIC CHEMOTHERAPY: ICD-10-CM

## 2021-10-13 LAB
ALBUMIN SERPL BCP-MCNC: 3.6 G/DL (ref 3.5–5.2)
ALP SERPL-CCNC: 51 U/L (ref 55–135)
ALT SERPL W/O P-5'-P-CCNC: 16 U/L (ref 10–44)
ANION GAP SERPL CALC-SCNC: 8 MMOL/L (ref 8–16)
AST SERPL-CCNC: 15 U/L (ref 10–40)
BASOPHILS # BLD AUTO: 0.06 K/UL (ref 0–0.2)
BASOPHILS NFR BLD: 1.3 % (ref 0–1.9)
BILIRUB SERPL-MCNC: 0.3 MG/DL (ref 0.1–1)
BUN SERPL-MCNC: 16 MG/DL (ref 8–23)
CALCIUM SERPL-MCNC: 10.1 MG/DL (ref 8.7–10.5)
CHLORIDE SERPL-SCNC: 108 MMOL/L (ref 95–110)
CO2 SERPL-SCNC: 26 MMOL/L (ref 23–29)
CREAT SERPL-MCNC: 1.1 MG/DL (ref 0.5–1.4)
DIFFERENTIAL METHOD: ABNORMAL
EOSINOPHIL # BLD AUTO: 0.1 K/UL (ref 0–0.5)
EOSINOPHIL NFR BLD: 1.9 % (ref 0–8)
ERYTHROCYTE [DISTWIDTH] IN BLOOD BY AUTOMATED COUNT: 15.4 % (ref 11.5–14.5)
EST. GFR  (AFRICAN AMERICAN): >60 ML/MIN/1.73 M^2
EST. GFR  (NON AFRICAN AMERICAN): 53 ML/MIN/1.73 M^2
GLUCOSE SERPL-MCNC: 152 MG/DL (ref 70–110)
HCT VFR BLD AUTO: 41.1 % (ref 37–48.5)
HGB BLD-MCNC: 12.8 G/DL (ref 12–16)
IMM GRANULOCYTES # BLD AUTO: 0.05 K/UL (ref 0–0.04)
IMM GRANULOCYTES NFR BLD AUTO: 1.1 % (ref 0–0.5)
LYMPHOCYTES # BLD AUTO: 0.9 K/UL (ref 1–4.8)
LYMPHOCYTES NFR BLD: 18.2 % (ref 18–48)
MCH RBC QN AUTO: 27.5 PG (ref 27–31)
MCHC RBC AUTO-ENTMCNC: 31.1 G/DL (ref 32–36)
MCV RBC AUTO: 88 FL (ref 82–98)
MONOCYTES # BLD AUTO: 0.5 K/UL (ref 0.3–1)
MONOCYTES NFR BLD: 10.4 % (ref 4–15)
NEUTROPHILS # BLD AUTO: 3.2 K/UL (ref 1.8–7.7)
NEUTROPHILS NFR BLD: 67.1 % (ref 38–73)
NRBC BLD-RTO: 0 /100 WBC
PLATELET # BLD AUTO: 194 K/UL (ref 150–450)
PMV BLD AUTO: 11.9 FL (ref 9.2–12.9)
POTASSIUM SERPL-SCNC: 4.6 MMOL/L (ref 3.5–5.1)
PROT SERPL-MCNC: 7.4 G/DL (ref 6–8.4)
RBC # BLD AUTO: 4.66 M/UL (ref 4–5.4)
SODIUM SERPL-SCNC: 142 MMOL/L (ref 136–145)
TSH SERPL DL<=0.005 MIU/L-ACNC: 3.72 UIU/ML (ref 0.4–4)
WBC # BLD AUTO: 4.72 K/UL (ref 3.9–12.7)

## 2021-10-13 PROCEDURE — 84443 ASSAY THYROID STIM HORMONE: CPT | Mod: HCNC,Q1 | Performed by: INTERNAL MEDICINE

## 2021-10-13 PROCEDURE — 85025 COMPLETE CBC W/AUTO DIFF WBC: CPT | Mod: HCNC,Q1 | Performed by: INTERNAL MEDICINE

## 2021-10-13 PROCEDURE — 36415 COLL VENOUS BLD VENIPUNCTURE: CPT | Mod: HCNC | Performed by: INTERNAL MEDICINE

## 2021-10-13 PROCEDURE — 80053 COMPREHEN METABOLIC PANEL: CPT | Mod: HCNC,Q1 | Performed by: INTERNAL MEDICINE

## 2021-10-14 ENCOUNTER — INFUSION (OUTPATIENT)
Dept: INFUSION THERAPY | Facility: HOSPITAL | Age: 66
End: 2021-10-14
Attending: INTERNAL MEDICINE
Payer: MEDICARE

## 2021-10-14 ENCOUNTER — OFFICE VISIT (OUTPATIENT)
Dept: HEMATOLOGY/ONCOLOGY | Facility: CLINIC | Age: 66
End: 2021-10-14
Payer: MEDICARE

## 2021-10-14 ENCOUNTER — RESEARCH ENCOUNTER (OUTPATIENT)
Dept: RESEARCH | Facility: HOSPITAL | Age: 66
End: 2021-10-14

## 2021-10-14 VITALS
HEIGHT: 62 IN | BODY MASS INDEX: 34.2 KG/M2 | OXYGEN SATURATION: 100 % | HEART RATE: 70 BPM | SYSTOLIC BLOOD PRESSURE: 138 MMHG | RESPIRATION RATE: 18 BRPM | TEMPERATURE: 98 F | WEIGHT: 185.88 LBS | DIASTOLIC BLOOD PRESSURE: 75 MMHG

## 2021-10-14 VITALS
SYSTOLIC BLOOD PRESSURE: 150 MMHG | TEMPERATURE: 98 F | WEIGHT: 185.88 LBS | OXYGEN SATURATION: 97 % | DIASTOLIC BLOOD PRESSURE: 85 MMHG | BODY MASS INDEX: 34.2 KG/M2 | HEART RATE: 76 BPM | RESPIRATION RATE: 20 BRPM | HEIGHT: 62 IN

## 2021-10-14 DIAGNOSIS — C34.81 MALIGNANT NEOPLASM OF OVERLAPPING SITES OF RIGHT BRONCHUS AND LUNG: ICD-10-CM

## 2021-10-14 DIAGNOSIS — T66.XXXA RADIATION-INDUCED ESOPHAGITIS: ICD-10-CM

## 2021-10-14 DIAGNOSIS — C34.91 NSCLC OF RIGHT LUNG: Primary | ICD-10-CM

## 2021-10-14 DIAGNOSIS — Z95.810 CARDIAC RESYNCHRONIZATION THERAPY DEFIBRILLATOR (CRT-D) IN PLACE: ICD-10-CM

## 2021-10-14 DIAGNOSIS — E11.9 TYPE 2 DIABETES MELLITUS WITHOUT COMPLICATION, WITHOUT LONG-TERM CURRENT USE OF INSULIN: ICD-10-CM

## 2021-10-14 DIAGNOSIS — I44.7 LEFT BUNDLE-BRANCH BLOCK: ICD-10-CM

## 2021-10-14 DIAGNOSIS — I10 ESSENTIAL HYPERTENSION: ICD-10-CM

## 2021-10-14 DIAGNOSIS — K20.80 RADIATION-INDUCED ESOPHAGITIS: ICD-10-CM

## 2021-10-14 DIAGNOSIS — R91.8 LUNG MASS: Primary | ICD-10-CM

## 2021-10-14 DIAGNOSIS — I42.0 DILATED CARDIOMYOPATHY: ICD-10-CM

## 2021-10-14 DIAGNOSIS — J70.0 RADIATION PNEUMONITIS: ICD-10-CM

## 2021-10-14 DIAGNOSIS — Z85.3 HISTORY OF BREAST CANCER IN FEMALE: ICD-10-CM

## 2021-10-14 DIAGNOSIS — Z00.6 CLINICAL TRIAL PARTICIPANT: ICD-10-CM

## 2021-10-14 PROCEDURE — 96413 CHEMO IV INFUSION 1 HR: CPT | Mod: HCNC,Q1

## 2021-10-14 PROCEDURE — 99999 PR PBB SHADOW E&M-EST. PATIENT-LVL V: CPT | Mod: PBBFAC,HCNC,, | Performed by: INTERNAL MEDICINE

## 2021-10-14 PROCEDURE — 99499 UNLISTED E&M SERVICE: CPT | Mod: S$GLB,,, | Performed by: NURSE PRACTITIONER

## 2021-10-14 PROCEDURE — 99215 OFFICE O/P EST HI 40 MIN: CPT | Mod: Q1,HCNC,S$GLB, | Performed by: INTERNAL MEDICINE

## 2021-10-14 PROCEDURE — 25000003 PHARM REV CODE 250: Mod: HCNC | Performed by: INTERNAL MEDICINE

## 2021-10-14 PROCEDURE — 99999 PR PBB SHADOW E&M-EST. PATIENT-LVL V: ICD-10-PCS | Mod: PBBFAC,HCNC,, | Performed by: INTERNAL MEDICINE

## 2021-10-14 PROCEDURE — 99215 PR OFFICE/OUTPT VISIT, EST, LEVL V, 40-54 MIN: ICD-10-PCS | Mod: Q1,HCNC,S$GLB, | Performed by: INTERNAL MEDICINE

## 2021-10-14 PROCEDURE — 99499 RISK ADDL DX/OHS AUDIT: ICD-10-PCS | Mod: S$GLB,,, | Performed by: NURSE PRACTITIONER

## 2021-10-14 PROCEDURE — 63600175 PHARM REV CODE 636 W HCPCS: Mod: HCNC,Q1 | Performed by: INTERNAL MEDICINE

## 2021-10-14 RX ORDER — DIPHENHYDRAMINE HYDROCHLORIDE 50 MG/ML
50 INJECTION INTRAMUSCULAR; INTRAVENOUS ONCE AS NEEDED
Status: DISCONTINUED | OUTPATIENT
Start: 2021-10-14 | End: 2021-10-14 | Stop reason: HOSPADM

## 2021-10-14 RX ORDER — DIPHENHYDRAMINE HYDROCHLORIDE 50 MG/ML
50 INJECTION INTRAMUSCULAR; INTRAVENOUS ONCE AS NEEDED
Status: CANCELLED | OUTPATIENT
Start: 2021-10-14

## 2021-10-14 RX ORDER — EPINEPHRINE 0.3 MG/.3ML
0.3 INJECTION SUBCUTANEOUS ONCE AS NEEDED
Status: DISCONTINUED | OUTPATIENT
Start: 2021-10-14 | End: 2021-10-14 | Stop reason: HOSPADM

## 2021-10-14 RX ORDER — EPINEPHRINE 0.3 MG/.3ML
0.3 INJECTION SUBCUTANEOUS ONCE AS NEEDED
Status: CANCELLED | OUTPATIENT
Start: 2021-10-14

## 2021-10-14 RX ORDER — SODIUM CHLORIDE 0.9 % (FLUSH) 0.9 %
10 SYRINGE (ML) INJECTION
Status: DISCONTINUED | OUTPATIENT
Start: 2021-10-14 | End: 2021-10-14 | Stop reason: HOSPADM

## 2021-10-14 RX ORDER — PANTOPRAZOLE SODIUM 40 MG/1
40 TABLET, DELAYED RELEASE ORAL DAILY
Qty: 30 TABLET | Refills: 1 | Status: SHIPPED | OUTPATIENT
Start: 2021-10-14 | End: 2021-11-09

## 2021-10-14 RX ORDER — HEPARIN 100 UNIT/ML
500 SYRINGE INTRAVENOUS
Status: DISCONTINUED | OUTPATIENT
Start: 2021-10-14 | End: 2021-10-14 | Stop reason: HOSPADM

## 2021-10-14 RX ORDER — SODIUM CHLORIDE 0.9 % (FLUSH) 0.9 %
10 SYRINGE (ML) INJECTION
Status: CANCELLED | OUTPATIENT
Start: 2021-10-14

## 2021-10-14 RX ORDER — HEPARIN 100 UNIT/ML
500 SYRINGE INTRAVENOUS
Status: CANCELLED | OUTPATIENT
Start: 2021-10-14

## 2021-10-14 RX ADMIN — SODIUM CHLORIDE: 9 INJECTION, SOLUTION INTRAVENOUS at 11:10

## 2021-10-14 RX ADMIN — HEPARIN 500 UNITS: 100 SYRINGE at 01:10

## 2021-10-18 ENCOUNTER — PATIENT MESSAGE (OUTPATIENT)
Dept: ADMINISTRATIVE | Facility: HOSPITAL | Age: 66
End: 2021-10-18
Payer: MEDICARE

## 2021-11-08 ENCOUNTER — LAB VISIT (OUTPATIENT)
Dept: LAB | Facility: HOSPITAL | Age: 66
End: 2021-11-08
Attending: INTERNAL MEDICINE
Payer: MEDICARE

## 2021-11-08 DIAGNOSIS — C34.91 NSCLC OF RIGHT LUNG: ICD-10-CM

## 2021-11-08 DIAGNOSIS — Z00.6 EXAMINATION OF PARTICIPANT IN CLINICAL TRIAL: ICD-10-CM

## 2021-11-08 LAB
ALBUMIN SERPL BCP-MCNC: 3.6 G/DL (ref 3.5–5.2)
ALP SERPL-CCNC: 55 U/L (ref 55–135)
ALT SERPL W/O P-5'-P-CCNC: 15 U/L (ref 10–44)
ANION GAP SERPL CALC-SCNC: 9 MMOL/L (ref 8–16)
AST SERPL-CCNC: 16 U/L (ref 10–40)
BASOPHILS # BLD AUTO: 0.04 K/UL (ref 0–0.2)
BASOPHILS NFR BLD: 0.8 % (ref 0–1.9)
BILIRUB SERPL-MCNC: 0.5 MG/DL (ref 0.1–1)
BUN SERPL-MCNC: 16 MG/DL (ref 8–23)
CALCIUM SERPL-MCNC: 10.5 MG/DL (ref 8.7–10.5)
CHLORIDE SERPL-SCNC: 103 MMOL/L (ref 95–110)
CO2 SERPL-SCNC: 27 MMOL/L (ref 23–29)
CREAT SERPL-MCNC: 1 MG/DL (ref 0.5–1.4)
DIFFERENTIAL METHOD: ABNORMAL
EOSINOPHIL # BLD AUTO: 0.1 K/UL (ref 0–0.5)
EOSINOPHIL NFR BLD: 2.3 % (ref 0–8)
ERYTHROCYTE [DISTWIDTH] IN BLOOD BY AUTOMATED COUNT: 14.9 % (ref 11.5–14.5)
EST. GFR  (AFRICAN AMERICAN): >60 ML/MIN/1.73 M^2
EST. GFR  (NON AFRICAN AMERICAN): 60 ML/MIN/1.73 M^2
GLUCOSE SERPL-MCNC: 148 MG/DL (ref 70–110)
HCT VFR BLD AUTO: 39.7 % (ref 37–48.5)
HGB BLD-MCNC: 12.6 G/DL (ref 12–16)
IMM GRANULOCYTES # BLD AUTO: 0.04 K/UL (ref 0–0.04)
IMM GRANULOCYTES NFR BLD AUTO: 0.8 % (ref 0–0.5)
LYMPHOCYTES # BLD AUTO: 0.9 K/UL (ref 1–4.8)
LYMPHOCYTES NFR BLD: 17.2 % (ref 18–48)
MCH RBC QN AUTO: 27.8 PG (ref 27–31)
MCHC RBC AUTO-ENTMCNC: 31.7 G/DL (ref 32–36)
MCV RBC AUTO: 88 FL (ref 82–98)
MONOCYTES # BLD AUTO: 0.6 K/UL (ref 0.3–1)
MONOCYTES NFR BLD: 10.8 % (ref 4–15)
NEUTROPHILS # BLD AUTO: 3.5 K/UL (ref 1.8–7.7)
NEUTROPHILS NFR BLD: 68.1 % (ref 38–73)
NRBC BLD-RTO: 0 /100 WBC
PLATELET # BLD AUTO: 207 K/UL (ref 150–450)
PMV BLD AUTO: 10.9 FL (ref 9.2–12.9)
POTASSIUM SERPL-SCNC: 4.9 MMOL/L (ref 3.5–5.1)
PROT SERPL-MCNC: 7.5 G/DL (ref 6–8.4)
RBC # BLD AUTO: 4.53 M/UL (ref 4–5.4)
SODIUM SERPL-SCNC: 139 MMOL/L (ref 136–145)
WBC # BLD AUTO: 5.11 K/UL (ref 3.9–12.7)

## 2021-11-08 PROCEDURE — 36415 COLL VENOUS BLD VENIPUNCTURE: CPT | Mod: HCNC,Q1 | Performed by: INTERNAL MEDICINE

## 2021-11-08 PROCEDURE — 85025 COMPLETE CBC W/AUTO DIFF WBC: CPT | Mod: HCNC,Q1 | Performed by: INTERNAL MEDICINE

## 2021-11-08 PROCEDURE — 80053 COMPREHEN METABOLIC PANEL: CPT | Mod: HCNC,Q1 | Performed by: INTERNAL MEDICINE

## 2021-11-09 ENCOUNTER — INFUSION (OUTPATIENT)
Dept: INFUSION THERAPY | Facility: HOSPITAL | Age: 66
End: 2021-11-09
Payer: MEDICARE

## 2021-11-09 ENCOUNTER — OFFICE VISIT (OUTPATIENT)
Dept: HEMATOLOGY/ONCOLOGY | Facility: CLINIC | Age: 66
End: 2021-11-09
Payer: MEDICARE

## 2021-11-09 ENCOUNTER — RESEARCH ENCOUNTER (OUTPATIENT)
Dept: RESEARCH | Facility: HOSPITAL | Age: 66
End: 2021-11-09
Payer: MEDICARE

## 2021-11-09 VITALS
WEIGHT: 185.88 LBS | DIASTOLIC BLOOD PRESSURE: 62 MMHG | RESPIRATION RATE: 20 BRPM | SYSTOLIC BLOOD PRESSURE: 136 MMHG | OXYGEN SATURATION: 95 % | TEMPERATURE: 99 F | HEART RATE: 96 BPM | HEIGHT: 62 IN | BODY MASS INDEX: 34.2 KG/M2

## 2021-11-09 VITALS
TEMPERATURE: 98 F | OXYGEN SATURATION: 97 % | HEART RATE: 85 BPM | DIASTOLIC BLOOD PRESSURE: 60 MMHG | SYSTOLIC BLOOD PRESSURE: 128 MMHG | RESPIRATION RATE: 16 BRPM

## 2021-11-09 DIAGNOSIS — Z85.3 HISTORY OF BREAST CANCER IN FEMALE: ICD-10-CM

## 2021-11-09 DIAGNOSIS — R91.8 LUNG MASS: Primary | ICD-10-CM

## 2021-11-09 DIAGNOSIS — I10 ESSENTIAL HYPERTENSION: ICD-10-CM

## 2021-11-09 DIAGNOSIS — I42.0 DILATED CARDIOMYOPATHY: ICD-10-CM

## 2021-11-09 DIAGNOSIS — C34.91 NSCLC OF RIGHT LUNG: Primary | ICD-10-CM

## 2021-11-09 DIAGNOSIS — J70.0 RADIATION PNEUMONITIS: ICD-10-CM

## 2021-11-09 DIAGNOSIS — C34.81 MALIGNANT NEOPLASM OF OVERLAPPING SITES OF RIGHT BRONCHUS AND LUNG: ICD-10-CM

## 2021-11-09 DIAGNOSIS — I44.7 LEFT BUNDLE-BRANCH BLOCK: ICD-10-CM

## 2021-11-09 DIAGNOSIS — T66.XXXA RADIATION-INDUCED ESOPHAGITIS: ICD-10-CM

## 2021-11-09 DIAGNOSIS — Z00.6 CLINICAL TRIAL PARTICIPANT: ICD-10-CM

## 2021-11-09 DIAGNOSIS — E11.9 TYPE 2 DIABETES MELLITUS WITHOUT COMPLICATION, WITHOUT LONG-TERM CURRENT USE OF INSULIN: ICD-10-CM

## 2021-11-09 DIAGNOSIS — Z95.810 CARDIAC RESYNCHRONIZATION THERAPY DEFIBRILLATOR (CRT-D) IN PLACE: ICD-10-CM

## 2021-11-09 DIAGNOSIS — K20.80 RADIATION-INDUCED ESOPHAGITIS: ICD-10-CM

## 2021-11-09 PROCEDURE — 63600175 PHARM REV CODE 636 W HCPCS: Mod: HCNC,Q1 | Performed by: INTERNAL MEDICINE

## 2021-11-09 PROCEDURE — 96413 CHEMO IV INFUSION 1 HR: CPT | Mod: HCNC,Q1

## 2021-11-09 PROCEDURE — 99999 PR PBB SHADOW E&M-EST. PATIENT-LVL IV: CPT | Mod: PBBFAC,HCNC,, | Performed by: INTERNAL MEDICINE

## 2021-11-09 PROCEDURE — A4216 STERILE WATER/SALINE, 10 ML: HCPCS | Mod: HCNC,Q1 | Performed by: INTERNAL MEDICINE

## 2021-11-09 PROCEDURE — 99215 OFFICE O/P EST HI 40 MIN: CPT | Mod: Q1,HCNC,S$GLB, | Performed by: INTERNAL MEDICINE

## 2021-11-09 PROCEDURE — 99215 PR OFFICE/OUTPT VISIT, EST, LEVL V, 40-54 MIN: ICD-10-PCS | Mod: Q1,HCNC,S$GLB, | Performed by: INTERNAL MEDICINE

## 2021-11-09 PROCEDURE — 99999 PR PBB SHADOW E&M-EST. PATIENT-LVL IV: ICD-10-PCS | Mod: PBBFAC,HCNC,, | Performed by: INTERNAL MEDICINE

## 2021-11-09 PROCEDURE — 25000003 PHARM REV CODE 250: Mod: HCNC,Q1 | Performed by: INTERNAL MEDICINE

## 2021-11-09 RX ORDER — DIPHENHYDRAMINE HYDROCHLORIDE 50 MG/ML
50 INJECTION INTRAMUSCULAR; INTRAVENOUS ONCE AS NEEDED
Status: CANCELLED | OUTPATIENT
Start: 2021-11-09

## 2021-11-09 RX ORDER — EPINEPHRINE 0.3 MG/.3ML
0.3 INJECTION SUBCUTANEOUS ONCE AS NEEDED
Status: DISCONTINUED | OUTPATIENT
Start: 2021-11-09 | End: 2021-11-09 | Stop reason: HOSPADM

## 2021-11-09 RX ORDER — SODIUM CHLORIDE 0.9 % (FLUSH) 0.9 %
10 SYRINGE (ML) INJECTION
Status: CANCELLED | OUTPATIENT
Start: 2021-11-09

## 2021-11-09 RX ORDER — HEPARIN 100 UNIT/ML
500 SYRINGE INTRAVENOUS
Status: DISCONTINUED | OUTPATIENT
Start: 2021-11-09 | End: 2021-11-09 | Stop reason: HOSPADM

## 2021-11-09 RX ORDER — SODIUM CHLORIDE 0.9 % (FLUSH) 0.9 %
10 SYRINGE (ML) INJECTION
Status: DISCONTINUED | OUTPATIENT
Start: 2021-11-09 | End: 2021-11-09 | Stop reason: HOSPADM

## 2021-11-09 RX ORDER — DIPHENHYDRAMINE HYDROCHLORIDE 50 MG/ML
50 INJECTION INTRAMUSCULAR; INTRAVENOUS ONCE AS NEEDED
Status: DISCONTINUED | OUTPATIENT
Start: 2021-11-09 | End: 2021-11-09 | Stop reason: HOSPADM

## 2021-11-09 RX ORDER — EPINEPHRINE 0.3 MG/.3ML
0.3 INJECTION SUBCUTANEOUS ONCE AS NEEDED
Status: CANCELLED | OUTPATIENT
Start: 2021-11-09

## 2021-11-09 RX ORDER — HEPARIN 100 UNIT/ML
500 SYRINGE INTRAVENOUS
Status: CANCELLED | OUTPATIENT
Start: 2021-11-09

## 2021-11-09 RX ADMIN — SODIUM CHLORIDE: 0.9 INJECTION, SOLUTION INTRAVENOUS at 02:11

## 2021-11-09 RX ADMIN — Medication 10 ML: at 03:11

## 2021-11-09 RX ADMIN — HEPARIN 500 UNITS: 100 SYRINGE at 03:11

## 2021-11-23 ENCOUNTER — PATIENT MESSAGE (OUTPATIENT)
Dept: HEMATOLOGY/ONCOLOGY | Facility: CLINIC | Age: 66
End: 2021-11-23
Payer: MEDICARE

## 2021-12-08 ENCOUNTER — LAB VISIT (OUTPATIENT)
Dept: LAB | Facility: HOSPITAL | Age: 66
End: 2021-12-08
Attending: INTERNAL MEDICINE
Payer: MEDICARE

## 2021-12-08 DIAGNOSIS — C34.91 NSCLC OF RIGHT LUNG: ICD-10-CM

## 2021-12-08 DIAGNOSIS — Z00.6 EXAMINATION OF PARTICIPANT IN CLINICAL TRIAL: ICD-10-CM

## 2021-12-08 LAB
ALBUMIN SERPL BCP-MCNC: 3.6 G/DL (ref 3.5–5.2)
ALP SERPL-CCNC: 55 U/L (ref 55–135)
ALT SERPL W/O P-5'-P-CCNC: 22 U/L (ref 10–44)
ANION GAP SERPL CALC-SCNC: 8 MMOL/L (ref 8–16)
AST SERPL-CCNC: 19 U/L (ref 10–40)
BASOPHILS # BLD AUTO: 0.04 K/UL (ref 0–0.2)
BASOPHILS NFR BLD: 0.8 % (ref 0–1.9)
BILIRUB SERPL-MCNC: 0.4 MG/DL (ref 0.1–1)
BUN SERPL-MCNC: 16 MG/DL (ref 8–23)
CALCIUM SERPL-MCNC: 9.8 MG/DL (ref 8.7–10.5)
CHLORIDE SERPL-SCNC: 106 MMOL/L (ref 95–110)
CO2 SERPL-SCNC: 26 MMOL/L (ref 23–29)
CREAT SERPL-MCNC: 1 MG/DL (ref 0.5–1.4)
DIFFERENTIAL METHOD: ABNORMAL
EOSINOPHIL # BLD AUTO: 0.1 K/UL (ref 0–0.5)
EOSINOPHIL NFR BLD: 2.6 % (ref 0–8)
ERYTHROCYTE [DISTWIDTH] IN BLOOD BY AUTOMATED COUNT: 14.9 % (ref 11.5–14.5)
EST. GFR  (AFRICAN AMERICAN): >60 ML/MIN/1.73 M^2
EST. GFR  (NON AFRICAN AMERICAN): 60 ML/MIN/1.73 M^2
GLUCOSE SERPL-MCNC: 129 MG/DL (ref 70–110)
HCT VFR BLD AUTO: 39.7 % (ref 37–48.5)
HGB BLD-MCNC: 12.5 G/DL (ref 12–16)
IMM GRANULOCYTES # BLD AUTO: 0.05 K/UL (ref 0–0.04)
IMM GRANULOCYTES NFR BLD AUTO: 1 % (ref 0–0.5)
LYMPHOCYTES # BLD AUTO: 0.9 K/UL (ref 1–4.8)
LYMPHOCYTES NFR BLD: 18 % (ref 18–48)
MCH RBC QN AUTO: 27.4 PG (ref 27–31)
MCHC RBC AUTO-ENTMCNC: 31.5 G/DL (ref 32–36)
MCV RBC AUTO: 87 FL (ref 82–98)
MONOCYTES # BLD AUTO: 0.5 K/UL (ref 0.3–1)
MONOCYTES NFR BLD: 9.9 % (ref 4–15)
NEUTROPHILS # BLD AUTO: 3.3 K/UL (ref 1.8–7.7)
NEUTROPHILS NFR BLD: 67.7 % (ref 38–73)
NRBC BLD-RTO: 0 /100 WBC
PLATELET # BLD AUTO: 175 K/UL (ref 150–450)
PMV BLD AUTO: 11.1 FL (ref 9.2–12.9)
POTASSIUM SERPL-SCNC: 4.4 MMOL/L (ref 3.5–5.1)
PROT SERPL-MCNC: 7.1 G/DL (ref 6–8.4)
RBC # BLD AUTO: 4.56 M/UL (ref 4–5.4)
SODIUM SERPL-SCNC: 140 MMOL/L (ref 136–145)
WBC # BLD AUTO: 4.94 K/UL (ref 3.9–12.7)

## 2021-12-08 PROCEDURE — 80053 COMPREHEN METABOLIC PANEL: CPT | Mod: HCNC | Performed by: INTERNAL MEDICINE

## 2021-12-08 PROCEDURE — 36415 COLL VENOUS BLD VENIPUNCTURE: CPT | Mod: HCNC | Performed by: INTERNAL MEDICINE

## 2021-12-08 PROCEDURE — 85025 COMPLETE CBC W/AUTO DIFF WBC: CPT | Mod: HCNC,Q1 | Performed by: INTERNAL MEDICINE

## 2021-12-09 ENCOUNTER — INFUSION (OUTPATIENT)
Dept: INFUSION THERAPY | Facility: HOSPITAL | Age: 66
End: 2021-12-09
Payer: MEDICARE

## 2021-12-09 ENCOUNTER — OFFICE VISIT (OUTPATIENT)
Dept: HEMATOLOGY/ONCOLOGY | Facility: CLINIC | Age: 66
End: 2021-12-09
Payer: MEDICARE

## 2021-12-09 ENCOUNTER — RESEARCH ENCOUNTER (OUTPATIENT)
Dept: RESEARCH | Facility: HOSPITAL | Age: 66
End: 2021-12-09
Payer: MEDICARE

## 2021-12-09 VITALS
HEIGHT: 62 IN | SYSTOLIC BLOOD PRESSURE: 158 MMHG | SYSTOLIC BLOOD PRESSURE: 127 MMHG | BODY MASS INDEX: 34.72 KG/M2 | TEMPERATURE: 99 F | HEART RATE: 104 BPM | WEIGHT: 188.69 LBS | TEMPERATURE: 99 F | DIASTOLIC BLOOD PRESSURE: 81 MMHG | OXYGEN SATURATION: 97 % | DIASTOLIC BLOOD PRESSURE: 74 MMHG | HEART RATE: 104 BPM | RESPIRATION RATE: 16 BRPM | RESPIRATION RATE: 16 BRPM

## 2021-12-09 DIAGNOSIS — Z85.3 HISTORY OF BREAST CANCER IN FEMALE: ICD-10-CM

## 2021-12-09 DIAGNOSIS — C34.91 NSCLC OF RIGHT LUNG: Primary | ICD-10-CM

## 2021-12-09 DIAGNOSIS — Z00.6 CLINICAL TRIAL PARTICIPANT: ICD-10-CM

## 2021-12-09 DIAGNOSIS — I42.0 DILATED CARDIOMYOPATHY: ICD-10-CM

## 2021-12-09 DIAGNOSIS — I10 ESSENTIAL HYPERTENSION: ICD-10-CM

## 2021-12-09 DIAGNOSIS — I44.7 LEFT BUNDLE-BRANCH BLOCK: ICD-10-CM

## 2021-12-09 DIAGNOSIS — E11.9 TYPE 2 DIABETES MELLITUS WITHOUT COMPLICATION, WITHOUT LONG-TERM CURRENT USE OF INSULIN: ICD-10-CM

## 2021-12-09 DIAGNOSIS — J70.0 RADIATION PNEUMONITIS: ICD-10-CM

## 2021-12-09 DIAGNOSIS — R91.8 LUNG MASS: Primary | ICD-10-CM

## 2021-12-09 DIAGNOSIS — Z95.810 CARDIAC RESYNCHRONIZATION THERAPY DEFIBRILLATOR (CRT-D) IN PLACE: ICD-10-CM

## 2021-12-09 PROCEDURE — 63600175 PHARM REV CODE 636 W HCPCS: Mod: HCNC | Performed by: INTERNAL MEDICINE

## 2021-12-09 PROCEDURE — A4216 STERILE WATER/SALINE, 10 ML: HCPCS | Mod: HCNC | Performed by: INTERNAL MEDICINE

## 2021-12-09 PROCEDURE — 99999 PR PBB SHADOW E&M-EST. PATIENT-LVL IV: ICD-10-PCS | Mod: PBBFAC,HCNC,, | Performed by: INTERNAL MEDICINE

## 2021-12-09 PROCEDURE — 99215 PR OFFICE/OUTPT VISIT, EST, LEVL V, 40-54 MIN: ICD-10-PCS | Mod: Q1,HCNC,S$GLB, | Performed by: INTERNAL MEDICINE

## 2021-12-09 PROCEDURE — 99215 OFFICE O/P EST HI 40 MIN: CPT | Mod: Q1,HCNC,S$GLB, | Performed by: INTERNAL MEDICINE

## 2021-12-09 PROCEDURE — 25000003 PHARM REV CODE 250: Mod: HCNC | Performed by: INTERNAL MEDICINE

## 2021-12-09 PROCEDURE — 99999 PR PBB SHADOW E&M-EST. PATIENT-LVL IV: CPT | Mod: PBBFAC,HCNC,, | Performed by: INTERNAL MEDICINE

## 2021-12-09 PROCEDURE — 96413 CHEMO IV INFUSION 1 HR: CPT | Mod: HCNC,Q1

## 2021-12-09 RX ORDER — DIPHENHYDRAMINE HYDROCHLORIDE 50 MG/ML
50 INJECTION INTRAMUSCULAR; INTRAVENOUS ONCE AS NEEDED
Status: DISCONTINUED | OUTPATIENT
Start: 2021-12-09 | End: 2021-12-09 | Stop reason: HOSPADM

## 2021-12-09 RX ORDER — EPINEPHRINE 0.3 MG/.3ML
0.3 INJECTION SUBCUTANEOUS ONCE AS NEEDED
Status: CANCELLED | OUTPATIENT
Start: 2021-12-09

## 2021-12-09 RX ORDER — SODIUM CHLORIDE 0.9 % (FLUSH) 0.9 %
10 SYRINGE (ML) INJECTION
Status: CANCELLED | OUTPATIENT
Start: 2021-12-09

## 2021-12-09 RX ORDER — SODIUM CHLORIDE 0.9 % (FLUSH) 0.9 %
10 SYRINGE (ML) INJECTION
Status: DISCONTINUED | OUTPATIENT
Start: 2021-12-09 | End: 2021-12-09 | Stop reason: HOSPADM

## 2021-12-09 RX ORDER — EPINEPHRINE 0.3 MG/.3ML
0.3 INJECTION SUBCUTANEOUS ONCE AS NEEDED
Status: DISCONTINUED | OUTPATIENT
Start: 2021-12-09 | End: 2021-12-09 | Stop reason: HOSPADM

## 2021-12-09 RX ORDER — DIPHENHYDRAMINE HYDROCHLORIDE 50 MG/ML
50 INJECTION INTRAMUSCULAR; INTRAVENOUS ONCE AS NEEDED
Status: CANCELLED | OUTPATIENT
Start: 2021-12-09

## 2021-12-09 RX ORDER — HEPARIN 100 UNIT/ML
500 SYRINGE INTRAVENOUS
Status: CANCELLED | OUTPATIENT
Start: 2021-12-09

## 2021-12-09 RX ORDER — HEPARIN 100 UNIT/ML
500 SYRINGE INTRAVENOUS
Status: DISCONTINUED | OUTPATIENT
Start: 2021-12-09 | End: 2021-12-09 | Stop reason: HOSPADM

## 2021-12-09 RX ADMIN — SODIUM CHLORIDE: 9 INJECTION, SOLUTION INTRAVENOUS at 02:12

## 2021-12-09 RX ADMIN — HEPARIN 500 UNITS: 100 SYRINGE at 04:12

## 2021-12-09 RX ADMIN — Medication 10 ML: at 04:12

## 2021-12-25 ENCOUNTER — CLINICAL SUPPORT (OUTPATIENT)
Dept: CARDIOLOGY | Facility: HOSPITAL | Age: 66
End: 2021-12-25
Payer: MEDICARE

## 2021-12-25 DIAGNOSIS — Z95.810 PRESENCE OF AUTOMATIC (IMPLANTABLE) CARDIAC DEFIBRILLATOR: ICD-10-CM

## 2021-12-25 PROCEDURE — 93295 CARDIAC DEVICE CHECK - REMOTE: ICD-10-PCS | Mod: HCNC,,, | Performed by: INTERNAL MEDICINE

## 2021-12-25 PROCEDURE — 93295 DEV INTERROG REMOTE 1/2/MLT: CPT | Mod: HCNC,,, | Performed by: INTERNAL MEDICINE

## 2021-12-25 PROCEDURE — 93296 REM INTERROG EVL PM/IDS: CPT | Mod: HCNC | Performed by: INTERNAL MEDICINE

## 2021-12-29 ENCOUNTER — HOSPITAL ENCOUNTER (OUTPATIENT)
Dept: RADIOLOGY | Facility: HOSPITAL | Age: 66
Discharge: HOME | End: 2021-12-29
Attending: INTERNAL MEDICINE
Payer: MEDICARE

## 2021-12-29 DIAGNOSIS — C34.90 MALIGNANT NEOPLASM OF UNSPECIFIED PART OF UNSPECIFIED BRONCHUS OR LUNG: ICD-10-CM

## 2021-12-29 DIAGNOSIS — Z00.6 EXAMINATION OF PARTICIPANT IN CLINICAL TRIAL: ICD-10-CM

## 2021-12-29 DIAGNOSIS — C34.91 NSCLC OF RIGHT LUNG: ICD-10-CM

## 2021-12-29 PROCEDURE — 71260 CT THORAX DX C+: CPT | Mod: TC,HCNC

## 2021-12-29 PROCEDURE — 25500020 PHARM REV CODE 255: Mod: HCNC,Q1 | Performed by: INTERNAL MEDICINE

## 2021-12-29 PROCEDURE — 71260 CT THORAX DX C+: CPT | Mod: 26,HCNC,, | Performed by: RADIOLOGY

## 2021-12-29 PROCEDURE — 71260 CT CHEST WITH CONTRAST: ICD-10-PCS | Mod: 26,HCNC,, | Performed by: RADIOLOGY

## 2021-12-29 RX ADMIN — IOHEXOL 100 ML: 350 INJECTION, SOLUTION INTRAVENOUS at 11:12

## 2021-12-30 ENCOUNTER — RESEARCH ENCOUNTER (OUTPATIENT)
Dept: RESEARCH | Facility: HOSPITAL | Age: 66
End: 2021-12-30
Payer: MEDICARE

## 2021-12-30 ENCOUNTER — LAB VISIT (OUTPATIENT)
Dept: LAB | Facility: HOSPITAL | Age: 66
End: 2021-12-30
Attending: INTERNAL MEDICINE
Payer: MEDICARE

## 2021-12-30 ENCOUNTER — OFFICE VISIT (OUTPATIENT)
Dept: HEMATOLOGY/ONCOLOGY | Facility: CLINIC | Age: 66
End: 2021-12-30
Payer: MEDICARE

## 2021-12-30 ENCOUNTER — TELEPHONE (OUTPATIENT)
Dept: SURGERY | Facility: CLINIC | Age: 66
End: 2021-12-30
Payer: MEDICARE

## 2021-12-30 VITALS
TEMPERATURE: 98 F | HEIGHT: 62 IN | DIASTOLIC BLOOD PRESSURE: 58 MMHG | OXYGEN SATURATION: 97 % | BODY MASS INDEX: 34.61 KG/M2 | HEART RATE: 100 BPM | RESPIRATION RATE: 16 BRPM | WEIGHT: 188.06 LBS | SYSTOLIC BLOOD PRESSURE: 123 MMHG

## 2021-12-30 DIAGNOSIS — I10 ESSENTIAL HYPERTENSION: ICD-10-CM

## 2021-12-30 DIAGNOSIS — Z00.6 CLINICAL TRIAL PARTICIPANT: ICD-10-CM

## 2021-12-30 DIAGNOSIS — C34.91 NSCLC OF RIGHT LUNG: Primary | ICD-10-CM

## 2021-12-30 DIAGNOSIS — E11.9 TYPE 2 DIABETES MELLITUS WITHOUT COMPLICATION, WITHOUT LONG-TERM CURRENT USE OF INSULIN: ICD-10-CM

## 2021-12-30 DIAGNOSIS — I44.7 LEFT BUNDLE-BRANCH BLOCK: ICD-10-CM

## 2021-12-30 DIAGNOSIS — J70.0 RADIATION PNEUMONITIS: ICD-10-CM

## 2021-12-30 DIAGNOSIS — E03.9 HYPOTHYROIDISM, UNSPECIFIED TYPE: ICD-10-CM

## 2021-12-30 DIAGNOSIS — Z95.810 CARDIAC RESYNCHRONIZATION THERAPY DEFIBRILLATOR (CRT-D) IN PLACE: ICD-10-CM

## 2021-12-30 DIAGNOSIS — C34.91 NSCLC OF RIGHT LUNG: ICD-10-CM

## 2021-12-30 DIAGNOSIS — Z85.3 HISTORY OF BREAST CANCER IN FEMALE: ICD-10-CM

## 2021-12-30 DIAGNOSIS — Z00.6 EXAMINATION OF PARTICIPANT IN CLINICAL TRIAL: ICD-10-CM

## 2021-12-30 DIAGNOSIS — I42.0 DILATED CARDIOMYOPATHY: ICD-10-CM

## 2021-12-30 DIAGNOSIS — Z79.899 ON ANTINEOPLASTIC CHEMOTHERAPY: ICD-10-CM

## 2021-12-30 LAB
ALBUMIN SERPL BCP-MCNC: 3.8 G/DL (ref 3.5–5.2)
ALP SERPL-CCNC: 51 U/L (ref 55–135)
ALT SERPL W/O P-5'-P-CCNC: 22 U/L (ref 10–44)
ANION GAP SERPL CALC-SCNC: 9 MMOL/L (ref 8–16)
AST SERPL-CCNC: 22 U/L (ref 10–40)
BASOPHILS # BLD AUTO: 0.05 K/UL (ref 0–0.2)
BASOPHILS NFR BLD: 1.1 % (ref 0–1.9)
BILIRUB SERPL-MCNC: 0.4 MG/DL (ref 0.1–1)
BUN SERPL-MCNC: 15 MG/DL (ref 8–23)
CALCIUM SERPL-MCNC: 10.3 MG/DL (ref 8.7–10.5)
CHLORIDE SERPL-SCNC: 108 MMOL/L (ref 95–110)
CO2 SERPL-SCNC: 27 MMOL/L (ref 23–29)
CREAT SERPL-MCNC: 1.1 MG/DL (ref 0.5–1.4)
DIFFERENTIAL METHOD: ABNORMAL
DRUG STUDY SPECIMEN TYPE: NORMAL
DRUG STUDY TEST NAME: NORMAL
DRUG STUDY TEST RESULT: NORMAL
EOSINOPHIL # BLD AUTO: 0.1 K/UL (ref 0–0.5)
EOSINOPHIL NFR BLD: 1.5 % (ref 0–8)
ERYTHROCYTE [DISTWIDTH] IN BLOOD BY AUTOMATED COUNT: 14.8 % (ref 11.5–14.5)
EST. GFR  (AFRICAN AMERICAN): >60 ML/MIN/1.73 M^2
EST. GFR  (NON AFRICAN AMERICAN): 53.2 ML/MIN/1.73 M^2
GLUCOSE SERPL-MCNC: 161 MG/DL (ref 70–110)
HCT VFR BLD AUTO: 41.6 % (ref 37–48.5)
HGB BLD-MCNC: 13 G/DL (ref 12–16)
IMM GRANULOCYTES # BLD AUTO: 0.03 K/UL (ref 0–0.04)
IMM GRANULOCYTES NFR BLD AUTO: 0.6 % (ref 0–0.5)
LYMPHOCYTES # BLD AUTO: 0.8 K/UL (ref 1–4.8)
LYMPHOCYTES NFR BLD: 17 % (ref 18–48)
MCH RBC QN AUTO: 27.2 PG (ref 27–31)
MCHC RBC AUTO-ENTMCNC: 31.3 G/DL (ref 32–36)
MCV RBC AUTO: 87 FL (ref 82–98)
MONOCYTES # BLD AUTO: 0.5 K/UL (ref 0.3–1)
MONOCYTES NFR BLD: 10.7 % (ref 4–15)
NEUTROPHILS # BLD AUTO: 3.2 K/UL (ref 1.8–7.7)
NEUTROPHILS NFR BLD: 69.1 % (ref 38–73)
NRBC BLD-RTO: 0 /100 WBC
PLATELET # BLD AUTO: 217 K/UL (ref 150–450)
PMV BLD AUTO: 11.7 FL (ref 9.2–12.9)
POTASSIUM SERPL-SCNC: 5 MMOL/L (ref 3.5–5.1)
PROT SERPL-MCNC: 7.6 G/DL (ref 6–8.4)
RBC # BLD AUTO: 4.78 M/UL (ref 4–5.4)
SODIUM SERPL-SCNC: 144 MMOL/L (ref 136–145)
T4 FREE SERPL-MCNC: 0.85 NG/DL (ref 0.71–1.51)
TSH SERPL DL<=0.005 MIU/L-ACNC: 4.94 UIU/ML (ref 0.4–4)
WBC # BLD AUTO: 4.66 K/UL (ref 3.9–12.7)

## 2021-12-30 PROCEDURE — 84439 ASSAY OF FREE THYROXINE: CPT | Mod: HCNC | Performed by: INTERNAL MEDICINE

## 2021-12-30 PROCEDURE — 3078F PR MOST RECENT DIASTOLIC BLOOD PRESSURE < 80 MM HG: ICD-10-PCS | Mod: HCNC,CPTII,S$GLB, | Performed by: INTERNAL MEDICINE

## 2021-12-30 PROCEDURE — 4010F PR ACE/ARB THEARPY RXD/TAKEN: ICD-10-PCS | Mod: HCNC,CPTII,S$GLB, | Performed by: INTERNAL MEDICINE

## 2021-12-30 PROCEDURE — 99000 SPECIMEN HANDLING OFFICE-LAB: CPT | Mod: HCNC | Performed by: INTERNAL MEDICINE

## 2021-12-30 PROCEDURE — 85025 COMPLETE CBC W/AUTO DIFF WBC: CPT | Mod: HCNC | Performed by: INTERNAL MEDICINE

## 2021-12-30 PROCEDURE — 3044F PR MOST RECENT HEMOGLOBIN A1C LEVEL <7.0%: ICD-10-PCS | Mod: HCNC,CPTII,S$GLB, | Performed by: INTERNAL MEDICINE

## 2021-12-30 PROCEDURE — 99999 PR PBB SHADOW E&M-EST. PATIENT-LVL IV: CPT | Mod: PBBFAC,HCNC,, | Performed by: INTERNAL MEDICINE

## 2021-12-30 PROCEDURE — 80053 COMPREHEN METABOLIC PANEL: CPT | Mod: HCNC,Q1 | Performed by: INTERNAL MEDICINE

## 2021-12-30 PROCEDURE — 3008F BODY MASS INDEX DOCD: CPT | Mod: HCNC,CPTII,S$GLB, | Performed by: INTERNAL MEDICINE

## 2021-12-30 PROCEDURE — 99999 PR PBB SHADOW E&M-EST. PATIENT-LVL IV: ICD-10-PCS | Mod: PBBFAC,HCNC,, | Performed by: INTERNAL MEDICINE

## 2021-12-30 PROCEDURE — 36415 COLL VENOUS BLD VENIPUNCTURE: CPT | Mod: HCNC | Performed by: INTERNAL MEDICINE

## 2021-12-30 PROCEDURE — 3074F SYST BP LT 130 MM HG: CPT | Mod: HCNC,CPTII,S$GLB, | Performed by: INTERNAL MEDICINE

## 2021-12-30 PROCEDURE — 99215 PR OFFICE/OUTPT VISIT, EST, LEVL V, 40-54 MIN: ICD-10-PCS | Mod: HCNC,S$GLB,, | Performed by: INTERNAL MEDICINE

## 2021-12-30 PROCEDURE — 1159F MED LIST DOCD IN RCRD: CPT | Mod: HCNC,CPTII,S$GLB, | Performed by: INTERNAL MEDICINE

## 2021-12-30 PROCEDURE — 99215 OFFICE O/P EST HI 40 MIN: CPT | Mod: HCNC,S$GLB,, | Performed by: INTERNAL MEDICINE

## 2021-12-30 PROCEDURE — 1159F PR MEDICATION LIST DOCUMENTED IN MEDICAL RECORD: ICD-10-PCS | Mod: HCNC,CPTII,S$GLB, | Performed by: INTERNAL MEDICINE

## 2021-12-30 PROCEDURE — 3078F DIAST BP <80 MM HG: CPT | Mod: HCNC,CPTII,S$GLB, | Performed by: INTERNAL MEDICINE

## 2021-12-30 PROCEDURE — 3044F HG A1C LEVEL LT 7.0%: CPT | Mod: HCNC,CPTII,S$GLB, | Performed by: INTERNAL MEDICINE

## 2021-12-30 PROCEDURE — 3008F PR BODY MASS INDEX (BMI) DOCUMENTED: ICD-10-PCS | Mod: HCNC,CPTII,S$GLB, | Performed by: INTERNAL MEDICINE

## 2021-12-30 PROCEDURE — 84443 ASSAY THYROID STIM HORMONE: CPT | Mod: HCNC | Performed by: INTERNAL MEDICINE

## 2021-12-30 PROCEDURE — 4010F ACE/ARB THERAPY RXD/TAKEN: CPT | Mod: HCNC,CPTII,S$GLB, | Performed by: INTERNAL MEDICINE

## 2021-12-30 PROCEDURE — 3074F PR MOST RECENT SYSTOLIC BLOOD PRESSURE < 130 MM HG: ICD-10-PCS | Mod: HCNC,CPTII,S$GLB, | Performed by: INTERNAL MEDICINE

## 2022-01-03 ENCOUNTER — PATIENT MESSAGE (OUTPATIENT)
Dept: SURGERY | Facility: CLINIC | Age: 67
End: 2022-01-03
Payer: MEDICARE

## 2022-01-03 ENCOUNTER — TELEPHONE (OUTPATIENT)
Dept: SURGERY | Facility: CLINIC | Age: 67
End: 2022-01-03
Payer: MEDICARE

## 2022-01-03 NOTE — TELEPHONE ENCOUNTER
Message sent through portal to patient with direct call back number to schedule her port removal. Voicemail previously left on patient's phone.

## 2022-01-15 DIAGNOSIS — F32.9 REACTIVE DEPRESSION: ICD-10-CM

## 2022-01-15 DIAGNOSIS — I10 BENIGN ESSENTIAL HTN: ICD-10-CM

## 2022-01-15 NOTE — TELEPHONE ENCOUNTER
Care Due:                  Date            Visit Type   Department     Provider  --------------------------------------------------------------------------------                                             [facility]                                           MEDICINE/  Last Visit: 09-      None         INTERNAL MED   Emanuel A  Page  Next Visit: None Scheduled  None         None Found                                                            Last  Test          Frequency    Reason                     Performed    Due Date  --------------------------------------------------------------------------------    HBA1C.......  6 months...  metFORMIN................  08- 01-    Lipid Panel.  12 months..  atorvastatin.............  Not Found    Overdue    Powered by [employer]. Reference number: [id number].   1/15/2022 3:28:04 PM CST

## 2022-01-18 ENCOUNTER — PATIENT MESSAGE (OUTPATIENT)
Dept: FAMILY MEDICINE | Facility: CLINIC | Age: 67
End: 2022-01-18
Payer: MEDICARE

## 2022-01-19 ENCOUNTER — PATIENT MESSAGE (OUTPATIENT)
Dept: FAMILY MEDICINE | Facility: CLINIC | Age: 67
End: 2022-01-19
Payer: MEDICARE

## 2022-01-19 ENCOUNTER — PROCEDURE VISIT (OUTPATIENT)
Dept: SURGERY | Facility: CLINIC | Age: 67
End: 2022-01-19
Payer: MEDICARE

## 2022-01-19 VITALS
TEMPERATURE: 99 F | BODY MASS INDEX: 34.74 KG/M2 | HEIGHT: 62 IN | SYSTOLIC BLOOD PRESSURE: 138 MMHG | OXYGEN SATURATION: 94 % | HEART RATE: 110 BPM | WEIGHT: 188.81 LBS | DIASTOLIC BLOOD PRESSURE: 85 MMHG

## 2022-01-19 DIAGNOSIS — K20.80 RADIATION-INDUCED ESOPHAGITIS: ICD-10-CM

## 2022-01-19 DIAGNOSIS — Z95.828 PORT-A-CATH IN PLACE: Primary | ICD-10-CM

## 2022-01-19 DIAGNOSIS — T66.XXXA RADIATION-INDUCED ESOPHAGITIS: ICD-10-CM

## 2022-01-19 PROCEDURE — 36590 PR REMOVAL TUNNELED CV CATH W SUBQ PORT OR PUMP: ICD-10-PCS | Mod: HCNC,S$GLB,, | Performed by: STUDENT IN AN ORGANIZED HEALTH CARE EDUCATION/TRAINING PROGRAM

## 2022-01-19 PROCEDURE — 36590 REMOVAL TUNNELED CV CATH: CPT | Mod: HCNC,S$GLB,, | Performed by: STUDENT IN AN ORGANIZED HEALTH CARE EDUCATION/TRAINING PROGRAM

## 2022-01-19 RX ORDER — PANTOPRAZOLE SODIUM 40 MG/1
TABLET, DELAYED RELEASE ORAL
Qty: 90 TABLET | Refills: 3 | Status: SHIPPED | OUTPATIENT
Start: 2022-01-19 | End: 2022-01-20 | Stop reason: SDUPTHER

## 2022-01-19 NOTE — PROCEDURES
"Hananh Montoya is a 66 y.o. female patient.    Temp: 98.7 °F (37.1 °C) (01/19/22 1121)  Pulse: 110 (01/19/22 1121)  BP: 138/85 (01/19/22 1121)  SpO2: (!) 94 % (01/19/22 1121)  Weight: 85.6 kg (188 lb 13.2 oz) (01/19/22 1121)  Height: 5' 2" (157.5 cm) (01/19/22 1121)       Procedures  General Surgery Bedside Procedure        Procedure: Removal of Port a Cath   Catheter has been in place for ~ 1 year, used for chemotherapy. She has completed her regimen and desires removal.      Right chest prepped and draped in normal sterile fashion  Time out was called  All 5 sterile barriers used   10 cc 1% lidocaine injected locally to skin around port and catheter  3 cm incision made   Blunt and sharp dissection used to free catheter cuff from surrounding attachments  Patient placed in head down position  Catheter removed and pressure held at neck for 5 minutes.  Catheter tunnel closed with 3-0 vicryl in figure of eight fashion  Incision closed with 3-0 vicryl and 4-0 monocryl. Sterile dressing applied.     Patient tolerated well.      Lauren Quinones MD  PGY-2, General Surgery  [facility]       1/19/2022  "

## 2022-01-20 DIAGNOSIS — K20.80 RADIATION-INDUCED ESOPHAGITIS: ICD-10-CM

## 2022-01-20 DIAGNOSIS — T66.XXXA RADIATION-INDUCED ESOPHAGITIS: ICD-10-CM

## 2022-01-20 RX ORDER — PANTOPRAZOLE SODIUM 40 MG/1
TABLET, DELAYED RELEASE ORAL
Qty: 90 TABLET | Refills: 3 | Status: SHIPPED | OUTPATIENT
Start: 2022-01-20 | End: 2022-01-28 | Stop reason: SDUPTHER

## 2022-01-20 NOTE — TELEPHONE ENCOUNTER
No new care gaps identified.  Powered by [employer]. Reference number: [id number].   1/20/2022 1:43:50 PM CST

## 2022-01-25 DIAGNOSIS — T66.XXXA RADIATION-INDUCED ESOPHAGITIS: ICD-10-CM

## 2022-01-25 DIAGNOSIS — K20.80 RADIATION-INDUCED ESOPHAGITIS: ICD-10-CM

## 2022-01-25 RX ORDER — PANTOPRAZOLE SODIUM 40 MG/1
TABLET, DELAYED RELEASE ORAL
Qty: 90 TABLET | Refills: 3 | OUTPATIENT
Start: 2022-01-25

## 2022-01-28 ENCOUNTER — TELEPHONE (OUTPATIENT)
Dept: FAMILY MEDICINE | Facility: CLINIC | Age: 67
End: 2022-01-28
Payer: MEDICARE

## 2022-01-28 DIAGNOSIS — T66.XXXA RADIATION-INDUCED ESOPHAGITIS: ICD-10-CM

## 2022-01-28 DIAGNOSIS — K20.80 RADIATION-INDUCED ESOPHAGITIS: ICD-10-CM

## 2022-01-28 RX ORDER — LOSARTAN POTASSIUM 100 MG/1
TABLET ORAL
Qty: 90 TABLET | Refills: 2 | Status: SHIPPED | OUTPATIENT
Start: 2022-01-28 | End: 2022-09-07

## 2022-01-28 RX ORDER — PANTOPRAZOLE SODIUM 40 MG/1
40 TABLET, DELAYED RELEASE ORAL DAILY
Qty: 90 TABLET | Refills: 3 | Status: SHIPPED | OUTPATIENT
Start: 2022-01-28 | End: 2022-03-30

## 2022-01-28 RX ORDER — BUPROPION HYDROCHLORIDE 150 MG/1
TABLET ORAL
Qty: 90 TABLET | Refills: 2 | Status: SHIPPED | OUTPATIENT
Start: 2022-01-28 | End: 2022-09-28

## 2022-01-28 NOTE — TELEPHONE ENCOUNTER
----- Message from Kimberly Joel MA sent at 1/28/2022  3:01 PM CST -----  Contact: [facility] [phone]  Please send over with direction to willem  ----- Message -----  From: Jakci Hansen  Sent: 1/28/2022   2:53 PM CST  To: Kathy Castellanos Staff    Type:  Pharmacy Calling to Clarify an RX    Name of Caller: Crissy     Pharmacy Name: [facility]    Prescription Name: pantoprazole (PROTONIX) 40 MG tablet    What do they need to clarify? Was sent over on 01-20-22 without directions. Please call with directions.     Best Call Back Number: [phone]

## 2022-01-28 NOTE — TELEPHONE ENCOUNTER
Refill Authorization Note   Hannah Montoya  is requesting a refill authorization.  Brief Assessment and Rationale for Refill:  Approve     Medication Therapy Plan:       Medication Reconciliation Completed: No   Comments:   --->Care Gap information included below if applicable.       Requested Prescriptions   Pending Prescriptions Disp Refills    losartan (COZAAR) 100 MG tablet [Pharmacy Med Name: LOSARTAN POTASSIUM 100 MG Tablet] 90 tablet 2     Sig: TAKE 1 TABLET EVERY DAY       Cardiovascular:  Angiotensin Receptor Blockers Passed - 1/28/2022  3:02 PM        Passed - Patient is at least 18 years old        Passed - Last BP in normal range within 360 days     BP Readings from Last 1 Encounters:   01/19/22 138/85               Passed - Valid encounter within last 15 months     Recent Visits  Date Type Provider Dept   09/15/21 Office Visit Emanuel Chavez MD [facility] Family Medicine/ Internal Med   09/04/20 Office Visit Emanuel Chavez MD [facility] Family Medicine/ Internal Med   08/03/20 Office Visit Emanuel Chavez MD [facility] Family Medicine/ Internal Med   Showing recent visits within past 720 days and meeting all other requirements  Future Appointments  No visits were found meeting these conditions.  Showing future appointments within next 150 days and meeting all other requirements      Future Appointments              In 3 weeks WB CT2 LIMIT 500 LBS [facility] - Imaging, [facility]    In 3 weeks Harvinder Lara MD [place] - Radiation Oncology 3rd Floor, [facility]    In 1 month LAB, SAME DAY [facility] - Lab (Venipuncture), [facility]    In 1 month Javi Avina MD [facility] - Hem Onc 3rd Fl, [facility]    In 1 month HOME MONITOR DEVICE CHECK, [facility] - Cardiology Svcs 3rd Fl, [facility]                Passed - Cr is 1.39 or below and within 360 days     Lab Results   Component Value Date    CREATININE 1.1 12/30/2021    CREATININE 1.0 12/08/2021    CREATININE 1.0 11/08/2021    POCCRE 1.1 09/09/2021     POCCRE 0.7 12/26/2017              Passed - K is 5.2 or below and within 360 days     POC Potassium   Date Value Ref Range Status   12/26/2017 3.5 (L) 3.6 - 5.1 mmol/L Final     Potassium   Date Value Ref Range Status   12/30/2021 5.0 3.5 - 5.1 mmol/L Final   12/08/2021 4.4 3.5 - 5.1 mmol/L Final   11/08/2021 4.9 3.5 - 5.1 mmol/L Final              Passed - eGFR within 360 days     Lab Results   Component Value Date    EGFRNONAA 53.2 (A) 12/30/2021    EGFRNONAA 60 12/08/2021    EGFRNONAA 60 11/08/2021                  buPROPion (WELLBUTRIN XL) 150 MG TB24 tablet [Pharmacy Med Name: BUPROPION HYDROCHLORIDE ER (XL) 150 MG Tablet Extended Release 24 Hour] 90 tablet 2     Sig: TAKE 1 TABLET EVERY DAY       Psychiatry: Antidepressants - bupropion Passed - 1/28/2022  3:02 PM        Passed - Patient is at least 18 years old        Passed - Last BP in normal range within 360 days     BP Readings from Last 1 Encounters:   01/19/22 138/85               Passed - Valid encounter within last 15 months     Recent Visits  Date Type Provider Dept   09/15/21 Office Visit Emanuel Chavez MD [facility]/ Internal Med   09/04/20 Office Visit Emanuel Chavez MD [facility]/ Internal Med   08/03/20 Office Visit Emanuel Chavez MD [facility]/ Internal Med   Showing recent visits within past 720 days and meeting all other requirements  Future Appointments  No visits were found meeting these conditions.  Showing future appointments within next 150 days and meeting all other requirements      Future Appointments              In 3 weeks WB CT2 LIMIT 500 LBS [facility] - Imaging, [facility]    In 3 weeks MD [facility] - Radiation Oncology 3rd Floor, [facility]    In 1 month LAB, SAME DAY [facility] - Lab (Venipuncture), [facility]    In 1 month MD [facility] - Hem Onc 3rd Fl, [facility]    In 1 month HOME MONITOR DEVICE CHECK, [facility] - Cardiology Svcs 3rd Fl,  [facility]                    Appointments  past 12m or future 3m with PCP    Date Provider   Last Visit   9/15/2021 Emanuel Chavez MD   Next Visit   1/16/2022 Emanuel Chavez MD   ED visits in past 90 days: 0     Note composed:3:07 PM 01/28/2022

## 2022-02-02 ENCOUNTER — PATIENT MESSAGE (OUTPATIENT)
Dept: FAMILY MEDICINE | Facility: CLINIC | Age: 67
End: 2022-02-02
Payer: MEDICARE

## 2022-02-02 DIAGNOSIS — J41.0 SIMPLE CHRONIC BRONCHITIS: ICD-10-CM

## 2022-02-02 RX ORDER — TIOTROPIUM BROMIDE 18 UG/1
CAPSULE ORAL; RESPIRATORY (INHALATION)
Qty: 30 CAPSULE | Refills: 0 | Status: SHIPPED | OUTPATIENT
Start: 2022-02-02 | End: 2022-04-06 | Stop reason: SDUPTHER

## 2022-02-13 ENCOUNTER — PATIENT MESSAGE (OUTPATIENT)
Dept: CARDIOLOGY | Facility: HOSPITAL | Age: 67
End: 2022-02-13
Payer: MEDICARE

## 2022-02-15 DIAGNOSIS — C34.91 NSCLC OF RIGHT LUNG: Primary | ICD-10-CM

## 2022-02-15 DIAGNOSIS — Z00.6 EXAMINATION OF PARTICIPANT IN CLINICAL TRIAL: ICD-10-CM

## 2022-02-17 ENCOUNTER — TELEPHONE (OUTPATIENT)
Dept: RESEARCH | Facility: HOSPITAL | Age: 67
End: 2022-02-17
Payer: MEDICARE

## 2022-02-17 NOTE — TELEPHONE ENCOUNTER
Spoke with patient and physician. CT chest with contrast required for protocol, however Dr. Avina wants CT of chest and abdomen with contrast. (NO ORAL CONTRAST needed per Dr. Avina). CT appointment and labs rescheduled. Patient was updated on new appointment details.       ----- Message from Javi Avina MD sent at 2/16/2022 10:27 AM CST -----  Regarding: RE: Follow up CT scans on [id number]  Early march is fine.  I am not sure why she just has a CT abdomen scheduled and why it was scheduled 2/23.  Let's cancel that order and just use my CT chest and abdomen with contrast order for March appointment.    ----- Message -----  From: Celine Penn RN  Sent: 2/15/2022   3:57 PM CST  To: Javi Avina MD, Reynold Smith, #  Subject: Follow up CT scans on [id number]                     Narciso,    Mrs. Montoya called regarding her scan appointment on 2/23/22. Her last CT scan was done on 12/29/2021. Per protocol, she is not due for a CT scan until the end of March.     Does she require this scan to be done earlier or are you ok with me rescheduling scan prior to her March MD visit in order to abide by study requirements?    Thanks,  Celine

## 2022-03-15 NOTE — PROGRESS NOTES
Ms. Montoya was called today regarding her participation in (IRB #[id number] PI: Rosa).   The Verbal Informed Consent was read and discussed by the consenter. The following was discussed:   Types of specimens to be collected   All medical information released to researchers will be stripped of identifiers and no patient information will be given to anyone outside of this research project.    Participating in a research study is not the same as getting regular medical care and will not improve the patient's health. The purpose of a research study is to gather information.  Being in this study does not interfere with your regular medical care.   The patient does not have to participate in this study. If they do not join, their care at [facility] will not be affected.  The person granting permission was provided adequate time to ask questions regarding the scope and purpose of the study.  Permission was obtained by telephone.   The above statements were read by the person obtaining permission to the person granting permission and witnessed by Gavino Dumont. The witness information was documented on the verbal consent form as well.  This Verbal Informed Consent process was conducted prior to initiation of any study procedures.     Advancement-Rotation Flap Text: The defect edges were debeveled with a #15 scalpel blade.  Given the location of the defect, shape of the defect and the proximity to free margins an advancement-rotation flap was deemed most appropriate.  Using a sterile surgical marker, an appropriate flap was drawn incorporating the defect and placing the expected incisions within the relaxed skin tension lines where possible. The area thus outlined was incised deep to adipose tissue with a #15 scalpel blade.  The skin margins were undermined to an appropriate distance in all directions utilizing iris scissors.

## 2022-03-21 DIAGNOSIS — F43.21 GRIEF REACTION: ICD-10-CM

## 2022-03-21 RX ORDER — SERTRALINE HYDROCHLORIDE 100 MG/1
100 TABLET, FILM COATED ORAL NIGHTLY
Qty: 90 TABLET | Refills: 0 | Status: SHIPPED | OUTPATIENT
Start: 2022-03-21 | End: 2022-06-03

## 2022-03-21 NOTE — TELEPHONE ENCOUNTER
No new care gaps identified.  Powered by [employer]. Reference number: [id number].   3/21/2022 12:13:15 PM CDT

## 2022-03-23 ENCOUNTER — HOSPITAL ENCOUNTER (OUTPATIENT)
Dept: RADIOLOGY | Facility: HOSPITAL | Age: 67
Discharge: HOME | End: 2022-03-23
Attending: INTERNAL MEDICINE
Payer: MEDICARE

## 2022-03-23 ENCOUNTER — LAB VISIT (OUTPATIENT)
Dept: LAB | Facility: HOSPITAL | Age: 67
End: 2022-03-23
Attending: INTERNAL MEDICINE
Payer: MEDICARE

## 2022-03-23 DIAGNOSIS — Z00.6 EXAMINATION OF PARTICIPANT IN CLINICAL TRIAL: ICD-10-CM

## 2022-03-23 DIAGNOSIS — E03.9 HYPOTHYROIDISM, UNSPECIFIED TYPE: ICD-10-CM

## 2022-03-23 DIAGNOSIS — C34.91 NSCLC OF RIGHT LUNG: ICD-10-CM

## 2022-03-23 LAB
ALBUMIN SERPL BCP-MCNC: 3.6 G/DL (ref 3.5–5.2)
ALP SERPL-CCNC: 44 U/L (ref 55–135)
ALT SERPL W/O P-5'-P-CCNC: 27 U/L (ref 10–44)
ANION GAP SERPL CALC-SCNC: 6 MMOL/L (ref 8–16)
AST SERPL-CCNC: 24 U/L (ref 10–40)
BASOPHILS # BLD AUTO: 0.05 K/UL (ref 0–0.2)
BASOPHILS NFR BLD: 1 % (ref 0–1.9)
BILIRUB SERPL-MCNC: 0.4 MG/DL (ref 0.1–1)
BUN SERPL-MCNC: 17 MG/DL (ref 8–23)
CALCIUM SERPL-MCNC: 9.3 MG/DL (ref 8.7–10.5)
CHLORIDE SERPL-SCNC: 107 MMOL/L (ref 95–110)
CO2 SERPL-SCNC: 28 MMOL/L (ref 23–29)
CREAT SERPL-MCNC: 1 MG/DL (ref 0.5–1.4)
DIFFERENTIAL METHOD: ABNORMAL
EOSINOPHIL # BLD AUTO: 0.1 K/UL (ref 0–0.5)
EOSINOPHIL NFR BLD: 2 % (ref 0–8)
ERYTHROCYTE [DISTWIDTH] IN BLOOD BY AUTOMATED COUNT: 14.8 % (ref 11.5–14.5)
EST. GFR  (AFRICAN AMERICAN): >60 ML/MIN/1.73 M^2
EST. GFR  (NON AFRICAN AMERICAN): 60 ML/MIN/1.73 M^2
GLUCOSE SERPL-MCNC: 149 MG/DL (ref 70–110)
HCT VFR BLD AUTO: 40.2 % (ref 37–48.5)
HGB BLD-MCNC: 12.6 G/DL (ref 12–16)
IMM GRANULOCYTES # BLD AUTO: 0.06 K/UL (ref 0–0.04)
IMM GRANULOCYTES NFR BLD AUTO: 1.2 % (ref 0–0.5)
LYMPHOCYTES # BLD AUTO: 0.8 K/UL (ref 1–4.8)
LYMPHOCYTES NFR BLD: 17 % (ref 18–48)
MCH RBC QN AUTO: 28.1 PG (ref 27–31)
MCHC RBC AUTO-ENTMCNC: 31.3 G/DL (ref 32–36)
MCV RBC AUTO: 90 FL (ref 82–98)
MONOCYTES # BLD AUTO: 0.6 K/UL (ref 0.3–1)
MONOCYTES NFR BLD: 11.5 % (ref 4–15)
NEUTROPHILS # BLD AUTO: 3.3 K/UL (ref 1.8–7.7)
NEUTROPHILS NFR BLD: 67.3 % (ref 38–73)
NRBC BLD-RTO: 0 /100 WBC
PLATELET # BLD AUTO: 176 K/UL (ref 150–450)
PMV BLD AUTO: 11.3 FL (ref 9.2–12.9)
POTASSIUM SERPL-SCNC: 4.9 MMOL/L (ref 3.5–5.1)
PROT SERPL-MCNC: 7.1 G/DL (ref 6–8.4)
RBC # BLD AUTO: 4.48 M/UL (ref 4–5.4)
SODIUM SERPL-SCNC: 141 MMOL/L (ref 136–145)
T4 FREE SERPL-MCNC: 0.83 NG/DL (ref 0.71–1.51)
TSH SERPL DL<=0.005 MIU/L-ACNC: 4.19 UIU/ML (ref 0.4–4)
WBC # BLD AUTO: 4.95 K/UL (ref 3.9–12.7)

## 2022-03-23 PROCEDURE — 74160 CT ABDOMEN W/CONTRAST: CPT | Mod: 26,,, | Performed by: RADIOLOGY

## 2022-03-23 PROCEDURE — 71260 CT CHEST ABDOMEN WITH CONTRAST (XPD): ICD-10-PCS | Mod: 26,,, | Performed by: RADIOLOGY

## 2022-03-23 PROCEDURE — 80053 COMPREHEN METABOLIC PANEL: CPT | Mod: Q1 | Performed by: INTERNAL MEDICINE

## 2022-03-23 PROCEDURE — 84443 ASSAY THYROID STIM HORMONE: CPT | Mod: Q1 | Performed by: INTERNAL MEDICINE

## 2022-03-23 PROCEDURE — 36415 COLL VENOUS BLD VENIPUNCTURE: CPT | Performed by: INTERNAL MEDICINE

## 2022-03-23 PROCEDURE — 71260 CT THORAX DX C+: CPT | Mod: 26,,, | Performed by: RADIOLOGY

## 2022-03-23 PROCEDURE — 74160 CT CHEST ABDOMEN WITH CONTRAST (XPD): ICD-10-PCS | Mod: 26,,, | Performed by: RADIOLOGY

## 2022-03-23 PROCEDURE — 84439 ASSAY OF FREE THYROXINE: CPT | Mod: Q1 | Performed by: INTERNAL MEDICINE

## 2022-03-23 PROCEDURE — 74160 CT ABDOMEN W/CONTRAST: CPT | Mod: TC,Q1

## 2022-03-23 PROCEDURE — 85025 COMPLETE CBC W/AUTO DIFF WBC: CPT | Mod: Q1 | Performed by: INTERNAL MEDICINE

## 2022-03-23 PROCEDURE — 25500020 PHARM REV CODE 255: Performed by: INTERNAL MEDICINE

## 2022-03-23 RX ADMIN — IOHEXOL 85 ML: 350 INJECTION, SOLUTION INTRAVENOUS at 12:03

## 2022-03-24 ENCOUNTER — OFFICE VISIT (OUTPATIENT)
Dept: HEMATOLOGY/ONCOLOGY | Facility: CLINIC | Age: 67
End: 2022-03-24
Payer: MEDICARE

## 2022-03-24 ENCOUNTER — OFFICE VISIT (OUTPATIENT)
Dept: RADIATION ONCOLOGY | Facility: CLINIC | Age: 67
End: 2022-03-24
Payer: MEDICARE

## 2022-03-24 VITALS
BODY MASS INDEX: 36.16 KG/M2 | RESPIRATION RATE: 18 BRPM | DIASTOLIC BLOOD PRESSURE: 81 MMHG | SYSTOLIC BLOOD PRESSURE: 134 MMHG | WEIGHT: 197.69 LBS | TEMPERATURE: 97 F | HEART RATE: 90 BPM

## 2022-03-24 VITALS
DIASTOLIC BLOOD PRESSURE: 81 MMHG | BODY MASS INDEX: 36.39 KG/M2 | SYSTOLIC BLOOD PRESSURE: 134 MMHG | OXYGEN SATURATION: 96 % | RESPIRATION RATE: 16 BRPM | WEIGHT: 197.75 LBS | HEART RATE: 90 BPM | TEMPERATURE: 97 F | HEIGHT: 62 IN

## 2022-03-24 DIAGNOSIS — I44.7 LEFT BUNDLE-BRANCH BLOCK: ICD-10-CM

## 2022-03-24 DIAGNOSIS — C34.91 NSCLC OF RIGHT LUNG: Primary | ICD-10-CM

## 2022-03-24 DIAGNOSIS — R79.89 ELEVATED TSH: ICD-10-CM

## 2022-03-24 DIAGNOSIS — T66.XXXA RADIATION-INDUCED ESOPHAGITIS: ICD-10-CM

## 2022-03-24 DIAGNOSIS — Z85.118 PERSONAL HISTORY OF LUNG CANCER: ICD-10-CM

## 2022-03-24 DIAGNOSIS — I42.0 DILATED CARDIOMYOPATHY: ICD-10-CM

## 2022-03-24 DIAGNOSIS — Z00.6 CLINICAL TRIAL PARTICIPANT: ICD-10-CM

## 2022-03-24 DIAGNOSIS — I10 ESSENTIAL HYPERTENSION: ICD-10-CM

## 2022-03-24 DIAGNOSIS — Z85.3 HISTORY OF BREAST CANCER IN FEMALE: ICD-10-CM

## 2022-03-24 DIAGNOSIS — J70.0 RADIATION PNEUMONITIS: ICD-10-CM

## 2022-03-24 DIAGNOSIS — E11.9 TYPE 2 DIABETES MELLITUS WITHOUT COMPLICATION, WITHOUT LONG-TERM CURRENT USE OF INSULIN: ICD-10-CM

## 2022-03-24 DIAGNOSIS — Z95.810 CARDIAC RESYNCHRONIZATION THERAPY DEFIBRILLATOR (CRT-D) IN PLACE: ICD-10-CM

## 2022-03-24 DIAGNOSIS — K20.80 RADIATION-INDUCED ESOPHAGITIS: ICD-10-CM

## 2022-03-24 PROCEDURE — 3075F PR MOST RECENT SYSTOLIC BLOOD PRESS GE 130-139MM HG: ICD-10-PCS | Mod: CPTII,S$GLB,, | Performed by: INTERNAL MEDICINE

## 2022-03-24 PROCEDURE — 4010F PR ACE/ARB THEARPY RXD/TAKEN: ICD-10-PCS | Mod: CPTII,S$GLB,, | Performed by: RADIOLOGY

## 2022-03-24 PROCEDURE — 3079F DIAST BP 80-89 MM HG: CPT | Mod: CPTII,S$GLB,, | Performed by: INTERNAL MEDICINE

## 2022-03-24 PROCEDURE — 99213 PR OFFICE/OUTPT VISIT, EST, LEVL III, 20-29 MIN: ICD-10-PCS | Mod: S$GLB,,, | Performed by: RADIOLOGY

## 2022-03-24 PROCEDURE — 3079F DIAST BP 80-89 MM HG: CPT | Mod: CPTII,S$GLB,, | Performed by: RADIOLOGY

## 2022-03-24 PROCEDURE — 4010F ACE/ARB THERAPY RXD/TAKEN: CPT | Mod: CPTII,S$GLB,, | Performed by: RADIOLOGY

## 2022-03-24 PROCEDURE — 3079F PR MOST RECENT DIASTOLIC BLOOD PRESSURE 80-89 MM HG: ICD-10-PCS | Mod: CPTII,S$GLB,, | Performed by: RADIOLOGY

## 2022-03-24 PROCEDURE — 4010F ACE/ARB THERAPY RXD/TAKEN: CPT | Mod: CPTII,S$GLB,, | Performed by: INTERNAL MEDICINE

## 2022-03-24 PROCEDURE — 3075F SYST BP GE 130 - 139MM HG: CPT | Mod: CPTII,S$GLB,, | Performed by: INTERNAL MEDICINE

## 2022-03-24 PROCEDURE — 1159F MED LIST DOCD IN RCRD: CPT | Mod: CPTII,S$GLB,, | Performed by: RADIOLOGY

## 2022-03-24 PROCEDURE — 3075F SYST BP GE 130 - 139MM HG: CPT | Mod: CPTII,S$GLB,, | Performed by: RADIOLOGY

## 2022-03-24 PROCEDURE — 3075F PR MOST RECENT SYSTOLIC BLOOD PRESS GE 130-139MM HG: ICD-10-PCS | Mod: CPTII,S$GLB,, | Performed by: RADIOLOGY

## 2022-03-24 PROCEDURE — 3079F PR MOST RECENT DIASTOLIC BLOOD PRESSURE 80-89 MM HG: ICD-10-PCS | Mod: CPTII,S$GLB,, | Performed by: INTERNAL MEDICINE

## 2022-03-24 PROCEDURE — 3008F PR BODY MASS INDEX (BMI) DOCUMENTED: ICD-10-PCS | Mod: CPTII,S$GLB,, | Performed by: RADIOLOGY

## 2022-03-24 PROCEDURE — 99999 PR PBB SHADOW E&M-EST. PATIENT-LVL III: CPT | Mod: PBBFAC,,, | Performed by: RADIOLOGY

## 2022-03-24 PROCEDURE — 99999 PR PBB SHADOW E&M-EST. PATIENT-LVL III: ICD-10-PCS | Mod: PBBFAC,,, | Performed by: RADIOLOGY

## 2022-03-24 PROCEDURE — 4010F PR ACE/ARB THEARPY RXD/TAKEN: ICD-10-PCS | Mod: CPTII,S$GLB,, | Performed by: INTERNAL MEDICINE

## 2022-03-24 PROCEDURE — 3008F BODY MASS INDEX DOCD: CPT | Mod: CPTII,S$GLB,, | Performed by: INTERNAL MEDICINE

## 2022-03-24 PROCEDURE — 99999 PR PBB SHADOW E&M-EST. PATIENT-LVL IV: ICD-10-PCS | Mod: PBBFAC,,, | Performed by: INTERNAL MEDICINE

## 2022-03-24 PROCEDURE — 99999 PR PBB SHADOW E&M-EST. PATIENT-LVL IV: CPT | Mod: PBBFAC,,, | Performed by: INTERNAL MEDICINE

## 2022-03-24 PROCEDURE — 3008F BODY MASS INDEX DOCD: CPT | Mod: CPTII,S$GLB,, | Performed by: RADIOLOGY

## 2022-03-24 PROCEDURE — 1159F PR MEDICATION LIST DOCUMENTED IN MEDICAL RECORD: ICD-10-PCS | Mod: CPTII,S$GLB,, | Performed by: RADIOLOGY

## 2022-03-24 PROCEDURE — 99215 OFFICE O/P EST HI 40 MIN: CPT | Mod: S$GLB,,, | Performed by: INTERNAL MEDICINE

## 2022-03-24 PROCEDURE — 3008F PR BODY MASS INDEX (BMI) DOCUMENTED: ICD-10-PCS | Mod: CPTII,S$GLB,, | Performed by: INTERNAL MEDICINE

## 2022-03-24 PROCEDURE — 99215 PR OFFICE/OUTPT VISIT, EST, LEVL V, 40-54 MIN: ICD-10-PCS | Mod: S$GLB,,, | Performed by: INTERNAL MEDICINE

## 2022-03-24 PROCEDURE — 99213 OFFICE O/P EST LOW 20 MIN: CPT | Mod: S$GLB,,, | Performed by: RADIOLOGY

## 2022-03-24 NOTE — PROGRESS NOTES
"2022    Radiation Oncology Follow-Up Visit    Prior Radiation History:    Site  Technique  Energy  Dose/Fx (Gy)  #Fx  Total Dose (Gy)  Start Date  End Date  Elapsed Days    RLL Lung  VMAT  6X  2    60  2020  43        Assessment   This is a 66 y.o. y/o female with Stage IIIA (cT3 cN1 M0) RLL NSCLC (squam) diagnosed on EBUS biopsy of 11R node 10/13/20. Resection would require bilobectomy, which her PFT's could not support. She enrolled in II0256 "Randomized Phase III Trial of [id number] (Durvalumab) as Concurrent and Consolidative Therapy or Consolidative Therapy Alone for Unresectable Stage 3 NSCLC " and completed definitive chemo-RT to 60 Gy in 30 fx on 20.     No late toxicity from treatment. CT C/A/P 3/23/22 without evidence of disease.        Plan   1) F/U with Dr. Avina as scheduled for continued surveillance.   2) I will see her back in 12 months.        Chief Complaint   Patient presents with    Lung Cancer     F/u       HPI: She has completed immunotherapy and is feeling well. Denies fevers, weight loss, chest pain, cough, or dyspnea. CT Chest 3/23/22 without evidence of disease.       Past Medical History:   Diagnosis Date    AICD (automatic cardioverter/defibrillator) present     Asthma     bronchitis in past    Breast cancer 2016    right    Cardiac pacemaker     Cardiomyopathy     COPD (chronic obstructive pulmonary disease)     Diabetes mellitus, type 2     Hyperglycemia     Hyperlipidemia     Hypertension     Malignant neoplasm of overlapping sites of female breast 2016    Nuclear sclerosis of both eyes 2020    Respiratory distress 3/12/2020       Past Surgical History:   Procedure Laterality Date    BREAST BIOPSY Right 2016    [facility]    BREAST LUMPECTOMY Right     CARDIAC CATHETERIZATION Bilateral 2017    CARDIAC DEFIBRILLATOR PLACEMENT Left 08/10/2017    CARDIAC DEFIBRILLATOR PLACEMENT Left 2018     SECTION      x2 "    CHOLECYSTECTOMY      INSERTION OF TUNNELED CENTRAL VENOUS CATHETER (CVC) WITH SUBCUTANEOUS PORT Right 2020    Procedure: [id number], RIGHT;  Surgeon: Josefa Caceres MD;  Location: [facility] OR 2ND FLR;  Service: General;  Laterality: Right;  Port-a-cath placed to R. IJ    LUNG BIOPSY N/A 2020    Procedure: BIOPSY, LUNG;  [facility];  Location: [facility] OR;  Service: Radiology;  Laterality: N/A;  8AM START  RN PREOP 2020---COVID NEGATIVE    NAVIGATIONAL BRONCHOSCOPY N/A 10/13/2020    Procedure: BRONCHOSCOPY, NAVIGATIONAL;  Surgeon: Jamilah Weaver MD;  [facility];  Service: Pulmonary;  Laterality: N/A;    REVISION OF IMPLANTABLE CARDIOVERTER-DEFIBRILLATOR (ICD) ELECTRODE LEAD PLACEMENT N/A 6/15/2018    Procedure: REVISION-LEAD-ICD;  Surgeon: Javy Gates MD;  Location: [facility] CATH LAB;  Service: Cardiology;  Laterality: N/A;  LBBB, Bi-V ICD HIS Elmo jordan, MDT, Choice, MB, 3 Prep    ROBOT-ASSISTED LAPAROSCOPIC LYMPHADENECTOMY USING DA SALVADOR XI Right 10/23/2020    Procedure: XI ROBOTIC LYMPHADENECTOMY;  Surgeon: Ramo Tucker MD;  [facility];  Service: Thoracic;  Laterality: Right;    TUBAL LIGATION         Social History     Tobacco Use    Smoking status: Former Smoker     Packs/day: 0.50     Years: 40.00     Pack years: 20.00     Types: Cigarettes     Quit date: 2016     Years since quittin.6    Smokeless tobacco: Never Used   Substance Use Topics    Alcohol use: Yes     Comment: rare- holiday    Drug use: No       Cancer-related family history is negative for Breast cancer, Ovarian cancer, and Cancer.    Current Outpatient Medications on File Prior to Visit   Medication Sig Dispense Refill    albuterol sulfate (PROAIR RESPICLICK) 90 mcg/actuation AePB Inhale 2 puffs into the lungs every 4 (four) hours as needed. Rescue 1 each 5    amLODIPine (NORVASC) 5 MG tablet TAKE 1 TABLET EVERY DAY 90 tablet 1     atorvastatin (LIPITOR) 10 MG tablet TAKE 1 TABLET EVERY DAY 90 tablet 3    blood sugar diagnostic Strp To check BG 3 times daily, to use with insurance preferred meter 200 each 3    buPROPion (WELLBUTRIN XL) 150 MG TB24 tablet TAKE 1 TABLET EVERY DAY 90 tablet 2    cetirizine (ZYRTEC) 10 MG tablet Take 10 mg by mouth every evening.       fluticasone-salmeterol diskus inhaler 250-50 mcg INHALE 1 PUFF TWICE DAILY 180 each 2    losartan (COZAAR) 100 MG tablet TAKE 1 TABLET EVERY DAY 90 tablet 2    metFORMIN (GLUCOPHAGE) 500 MG tablet TAKE 1 TABLET TWICE DAILY 180 tablet 1    metoprolol succinate (TOPROL-XL) 100 MG 24 hr tablet TAKE 1 TABLET EVERY DAY 90 tablet 3    multivitamin (THERAGRAN) per tablet Take 1 tablet by mouth once daily.      pantoprazole (PROTONIX) 40 MG tablet Take 1 tablet (40 mg total) by mouth once daily. UNKNOWN 90 tablet 3    sertraline (ZOLOFT) 100 MG tablet Take 1 tablet (100 mg total) by mouth every evening. 90 tablet 0    tiotropium (SPIRIVA WITH HANDIHALER) 18 mcg inhalation capsule INHALE THE CONTENTS OF 1 CAPSULE EVERY DAY 30 capsule 0     Current Facility-Administered Medications on File Prior to Visit   Medication Dose Route Frequency Provider Last Rate Last Admin    fentaNYL injection 25 mcg  25 mcg Intravenous Q5 Min PRN Keesha Martins MD        haloperidol lactate injection 0.5 mg  0.5 mg Intravenous Once PRN Keesha Martins MD        HYDROmorphone injection 0.2 mg  0.2 mg Intravenous Q5 Min PRN Keesha Martins MD        ondansetron injection 4 mg  4 mg Intravenous Once PRN Keesha Martins MD        sodium chloride 0.9% flush 10 mL  10 mL Intravenous PRN Keesha Martins MD           Review of patient's allergies indicates:   Allergen Reactions    Taxol [paclitaxel]      Hypersensitivity reaction to taxol, symptoms included shortness of breath, nausea, dizziness, flushing     Adhesive Rash     tegaderm burns and blistered skin       Review of Systems   Constitutional: Negative for weight loss.    HENT: Negative for ear pain.    Eyes: Negative for blurred vision and double vision.   Respiratory: Negative for hemoptysis and shortness of breath.    Cardiovascular: Negative for leg swelling.   Gastrointestinal: Negative for constipation, diarrhea and heartburn.   Genitourinary: Negative for dysuria and hematuria.   Musculoskeletal: Negative for falls and joint pain.   Neurological: Negative for tingling, speech change, focal weakness and seizures.   Psychiatric/Behavioral: Negative for depression. The patient is not nervous/anxious.         Vital Signs: /81 (BP Location: Right arm)   Pulse 90   Temp 97.1 °F (36.2 °C) (Oral)   Resp 18   Wt 89.7 kg (197 lb 11.2 oz)   LMP  (LMP Unknown)   BMI 36.16 kg/m²     ECOG Performance Status: 0 - Fully Active    Physical Exam  Vitals reviewed.   Constitutional:       Appearance: Normal appearance.   HENT:      Head: Normocephalic and atraumatic.   Eyes:      General: No scleral icterus.     Extraocular Movements: Extraocular movements intact.   Pulmonary:      Effort: No accessory muscle usage or respiratory distress.   Abdominal:      General: There is no distension.   Musculoskeletal:         General: Normal range of motion.      Cervical back: Normal range of motion and neck supple.   Lymphadenopathy:      Cervical: No cervical adenopathy.   Skin:     General: Skin is warm and dry.   Neurological:      Mental Status: She is alert and oriented to person, place, and time.      Cranial Nerves: No cranial nerve deficit.   Psychiatric:         Mood and Affect: Mood and affect normal.         Judgment: Judgment normal.          Labs:    Imaging: I have personally reviewed the patient's available images and reports and summarized pertinent findings above in HPI.     Pathology: No new path

## 2022-03-24 NOTE — PROGRESS NOTES
ONCOLOGY FOLLOW UP VISIT.     Reason for visit: Imaging review s/p [id number] clinical trial for stage IIIA NSCLC    Cancer/Stage/TNM:   Cancer Staging  NSCLC of right lung  Staging form: Lung, AJCC 8th Edition  - Clinical stage from 9/29/2020: Stage IIIA (cT3, cN1, cM0) - Signed by Ramo Tucker MD on 10/24/2020       Oncology History   NSCLC of right lung   9/29/2020 Cancer Staged    Staging form: Lung, AJCC 8th Edition  - Clinical stage from 9/29/2020: Stage IIIA (cT3, cN1, cM0)     10/14/2020 Initial Diagnosis    NSCLC of right lung     11/17/2020 - 12/30/2020 Radiation Therapy    Treating physician: Harvinder Vásquez  Technique  Energy  Dose/Fx (Gy)  #Fx  Total Dose (Gy)    RLL Lung  VMAT  6X  2  30 / 30  60              Interval History:   Today, patient reports feeling well overall, no new symptoms. Protonix working well for GERD. She denies SOB, cough, dizziness, rashes, diarrhea, vision changes, nausea, pain.     ROS:  Review of Systems   Constitutional: Negative for chills, fever and weight loss.   HENT: Negative for hearing loss, nosebleeds and sore throat.    Eyes: Negative for blurred vision and double vision.   Respiratory: Negative for cough, hemoptysis and shortness of breath (Resolved back to baseline).    Cardiovascular: Negative for chest pain and leg swelling.   Gastrointestinal: Negative for abdominal pain, blood in stool, diarrhea, heartburn, nausea and vomiting.   Genitourinary: Negative for dysuria, frequency and hematuria.   Musculoskeletal: Negative for back pain, joint pain and myalgias.   Skin: Negative for itching and rash.   Neurological: Negative for dizziness, tingling, sensory change, speech change and headaches.   Endo/Heme/Allergies: Does not bruise/bleed easily.   Psychiatric/Behavioral: The patient is not nervous/anxious.           A complete 12-point review of systems was reviewed and is negative except as mentioned above.     Past Medical History:   Past Medical  History:   Diagnosis Date    AICD (automatic cardioverter/defibrillator) present     Asthma     bronchitis in past    Breast cancer 2016    right    Cardiac pacemaker     Cardiomyopathy     COPD (chronic obstructive pulmonary disease)     Diabetes mellitus, type 2     Hyperglycemia     Hyperlipidemia     Hypertension     Malignant neoplasm of overlapping sites of female breast 2/12/2016    Nuclear sclerosis of both eyes 8/12/2020    Respiratory distress 3/12/2020        Allergies:   Review of patient's allergies indicates:   Allergen Reactions    Taxol [paclitaxel]      Hypersensitivity reaction to taxol, symptoms included shortness of breath, nausea, dizziness, flushing     Adhesive Rash     tegaderm burns and blistered skin        Medications:   Current Outpatient Medications   Medication Sig Dispense Refill    albuterol sulfate (PROAIR RESPICLICK) 90 mcg/actuation AePB Inhale 2 puffs into the lungs every 4 (four) hours as needed. Rescue 1 each 5    amLODIPine (NORVASC) 5 MG tablet TAKE 1 TABLET EVERY DAY 90 tablet 1    atorvastatin (LIPITOR) 10 MG tablet TAKE 1 TABLET EVERY DAY 90 tablet 3    blood sugar diagnostic Strp To check BG 3 times daily, to use with insurance preferred meter 200 each 3    buPROPion (WELLBUTRIN XL) 150 MG TB24 tablet TAKE 1 TABLET EVERY DAY 90 tablet 2    cetirizine (ZYRTEC) 10 MG tablet Take 10 mg by mouth every evening.       fluticasone-salmeterol diskus inhaler 250-50 mcg INHALE 1 PUFF TWICE DAILY 180 each 2    losartan (COZAAR) 100 MG tablet TAKE 1 TABLET EVERY DAY 90 tablet 2    metFORMIN (GLUCOPHAGE) 500 MG tablet TAKE 1 TABLET TWICE DAILY 180 tablet 1    metoprolol succinate (TOPROL-XL) 100 MG 24 hr tablet TAKE 1 TABLET EVERY DAY 90 tablet 3    multivitamin (THERAGRAN) per tablet Take 1 tablet by mouth once daily.      pantoprazole (PROTONIX) 40 MG tablet Take 1 tablet (40 mg total) by mouth once daily. UNKNOWN 90 tablet 3    sertraline (ZOLOFT) 100 MG  "tablet Take 1 tablet (100 mg total) by mouth every evening. 90 tablet 0    tiotropium (SPIRIVA WITH HANDIHALER) 18 mcg inhalation capsule INHALE THE CONTENTS OF 1 CAPSULE EVERY DAY 30 capsule 0     No current facility-administered medications for this visit.     Facility-Administered Medications Ordered in Other Visits   Medication Dose Route Frequency Provider Last Rate Last Admin    fentaNYL injection 25 mcg  25 mcg Intravenous Q5 Min PRCOURTNEY Martins MD        haloperidol lactate injection 0.5 mg  0.5 mg Intravenous Once PRN Keesha Martins MD        HYDROmorphone injection 0.2 mg  0.2 mg Intravenous Q5 Min PRN Keesha Martins MD        ondansetron injection 4 mg  4 mg Intravenous Once PRN Keesha Martins MD        sodium chloride 0.9% flush 10 mL  10 mL Intravenous PRN Keesha Martins MD            Physical Exam:   /81   Pulse 90   Temp 97.1 °F (36.2 °C) (Oral)   Resp 16   Ht 5' 2" (1.575 m)   Wt 89.7 kg (197 lb 12 oz)   LMP  (LMP Unknown)   SpO2 96%   BMI 36.17 kg/m²      ECOG Performance Status: (foot note - ECOG PS provided by Eastern Cooperative Oncology Group) 0 - Asymptomatic    Physical Exam  Constitutional:       General: She is not in acute distress.  HENT:      Head: Normocephalic and atraumatic.   Eyes:      Conjunctiva/sclera: Conjunctivae normal.   Cardiovascular:      Rate and Rhythm: Normal rate and regular rhythm.      Heart sounds: No murmur heard.    No friction rub. No gallop.   Pulmonary:      Effort: Pulmonary effort is normal. No respiratory distress.      Breath sounds: Normal breath sounds.   Abdominal:      General: There is no distension.      Palpations: Abdomen is soft.      Tenderness: There is no abdominal tenderness.   Musculoskeletal:         General: Normal range of motion.      Cervical back: Normal range of motion and neck supple.   Skin:     General: Skin is warm and dry.      Findings: No rash.   Neurological:      Mental Status: She is alert and oriented to person, place, and " time.   Psychiatric:         Mood and Affect: Affect normal.         Judgment: Judgment normal.                 Labs:   Recent Results (from the past 48 hour(s))   CBC Auto Differential    Collection Time: 03/23/22 10:57 AM   Result Value Ref Range    WBC 4.95 3.90 - 12.70 K/uL    RBC 4.48 4.00 - 5.40 M/uL    Hemoglobin 12.6 12.0 - 16.0 g/dL    Hematocrit 40.2 37.0 - 48.5 %    MCV 90 82 - 98 fL    MCH 28.1 27.0 - 31.0 pg    MCHC 31.3 (L) 32.0 - 36.0 g/dL    RDW 14.8 (H) 11.5 - 14.5 %    Platelets 176 150 - 450 K/uL    MPV 11.3 9.2 - 12.9 fL    Immature Granulocytes 1.2 (H) 0.0 - 0.5 %    Gran # (ANC) 3.3 1.8 - 7.7 K/uL    Immature Grans (Abs) 0.06 (H) 0.00 - 0.04 K/uL    Lymph # 0.8 (L) 1.0 - 4.8 K/uL    Mono # 0.6 0.3 - 1.0 K/uL    Eos # 0.1 0.0 - 0.5 K/uL    Baso # 0.05 0.00 - 0.20 K/uL    nRBC 0 0 /100 WBC    Gran % 67.3 38.0 - 73.0 %    Lymph % 17.0 (L) 18.0 - 48.0 %    Mono % 11.5 4.0 - 15.0 %    Eosinophil % 2.0 0.0 - 8.0 %    Basophil % 1.0 0.0 - 1.9 %    Differential Method Automated    Comprehensive Metabolic Panel    Collection Time: 03/23/22 10:57 AM   Result Value Ref Range    Sodium 141 136 - 145 mmol/L    Potassium 4.9 3.5 - 5.1 mmol/L    Chloride 107 95 - 110 mmol/L    CO2 28 23 - 29 mmol/L    Glucose 149 (H) 70 - 110 mg/dL    BUN 17 8 - 23 mg/dL    Creatinine 1.0 0.5 - 1.4 mg/dL    Calcium 9.3 8.7 - 10.5 mg/dL    Total Protein 7.1 6.0 - 8.4 g/dL    Albumin 3.6 3.5 - 5.2 g/dL    Total Bilirubin 0.4 0.1 - 1.0 mg/dL    Alkaline Phosphatase 44 (L) 55 - 135 U/L    AST 24 10 - 40 U/L    ALT 27 10 - 44 U/L    Anion Gap 6 (L) 8 - 16 mmol/L    eGFR if African American >60 >60 mL/min/1.73 m^2    eGFR if non African American 60 >60 mL/min/1.73 m^2   TSH    Collection Time: 03/23/22 10:57 AM   Result Value Ref Range    TSH 4.186 (H) 0.400 - 4.000 uIU/mL   T4, Free    Collection Time: 03/23/22 10:57 AM   Result Value Ref Range    Free T4 0.83 0.71 - 1.51 ng/dL        Imaging:  CT Chest Abdomen With Contrast  (XPD)  Narrative: EXAMINATION:  CT CHEST ABDOMEN WITH CONTRAST (XPD)    CLINICAL HISTORY:  Non-small cell lung cancer (NSCLC), non-metastatic, assess treatment response;RECIST measurements needed. Please compare to baseline scan 11/7/2020. Lesion 1: Right lower lobe mass.  5.1 cm.  Series 2, image 60 Lesion 2: Right hilar node.  1.8 cm short axis. NTL Subcarinal lymph node absent or preseMalignant neoplasm of unspecified part of right bronchus or lung    TECHNIQUE:  Low dose axial images, sagittal and coronal reformations were obtained from the thoracic inlet to the pubic symphysis after IV administration of 85 cc of Omnipaque.    COMPARISON:  Chest CT 12/29/2021 and PET-CT 09/14/2021    FINDINGS:  Chest:    Heart and mediastinal vasculature: Aortic and coronary atherosclerosis, cardiomegaly and pacemaker with transthoracic epicardial leads redemonstrated.    Larissa/Mediastinum: No significant lymphadenopathy    Lungs: Right perihilar and right lung base bronchiectasis, volume loss and posterior right lower lobe coalescent consolidation with adjacent parenchymal scarring appears stable from prior suggestive of sequela of treatment.  Underlying residual neoplastic disease is difficult to exclude with certainty.  Similar although less extensive postinflammatory changes are seen in the lingula.  Compared with the prior study, fluctuating areas of ground-glass and nodular opacity appear overall similar, with some areas improved and some worse.  8 mm left basilar nodule is associated with architectural distortion and scarring.  Largest area of ground-glass change that is improved is in the left lung base with improving left basilar nodular opacities as well.  No pleural effusions.    Abdomen:    Liver: Normal in size and attenuation, with no focal hepatic lesions.    Gallbladder: No calcified gallstones.    Bile Ducts: No evidence of dilated ducts.    Pancreas: No mass or peripancreatic fat stranding.    Spleen:  Unremarkable.    Adrenals: Unremarkable.    Kidneys/ Ureters: Normal in size and location. No hydronephrosis, solid mass, or nephrolithiasis.    GI Tract/Mesentery: Visualized portions unremarkable.    Peritoneal Space: No ascites. No free air.    Retroperitoneum: No significant adenopathy.    Vasculature: Aortic atherosclerosis is present.    Bones: No suspicious lytic or blastic lesions.  Impression: Stable perihilar area of consolidation in the right lung predominantly in the right base which may reflect a combination of treatment related change and or residual disease.    Additional stable fluctuating nodular and ground-glass opacities within the lungs most likely inflammatory in nature    No convincing evidence of metastatic disease in the upper abdomen    Electronically signed by: Serafin Wallace Jr  Date:    03/24/2022  Time:    09:22            Diagnoses:       1. NSCLC of right lung    2. Clinical trial participant    3. Radiation pneumonitis    4. History of breast cancer in female    5. Dilated cardiomyopathy    6. Left bundle-branch block    7. Cardiac resynchronization therapy defibrillator (CRT-D) in place    8. Essential hypertension    9. Type 2 diabetes mellitus without complication, without long-term current use of insulin    10. Elevated TSH    11. Radiation-induced esophagitis          Assessment and Plan:         1,2. Stage IIIA NSCLC  Plan is for definitive chemoRT, will need re-staging scans prior.  Reviewed with patient in detail her diagnosis, stage, treatment options, and prognosis (3 year OS 66% vs. 43.5% without durvalumab) with standard of care chemoRT followed by maintenance immunotherapy.  Patient has consented for [id number] clinical trial, a randomized control trial evaluating combination chemoRT + durvalumab followed by maintenance vs. SOC chemoRT -> maintenance.  CT scans 7/2021 with CR.  - patient randomized on [id number] to SOC arm.  Patient has a good performance status ECOG 0 and  life expectancy > 12 weeks.    - CT scans on 3.24.22 continue to show complete response.  Will continue with q 3 adrian imagine, next due June.    3 Grade 2 initially, now resolved. Durvalumab held for 2 week steroid taper. Continue COPD maintenance inhalers and she has duonebs prn.     4. 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      5,6,7.  Stable, follows with cardiology.  AICD placed, but patient now has recovered EF and only takes lasix prn.  NYHA class I.    8,9 Controlled     10. Mildly elevated, will treat for TSH >10.    11. Continue Protonix.     Patient was also seen and examined by Dr. Avina. Patient is in agreement with the proposed treatment plan. All questions were answered to the patient's satisfaction. Pt knows to call clinic if anything is needed before the next clinic visit.    Aide Magaña, MSN, APRN, FNP-C  Hematology and Medical Oncology  Nurse Practitioner to Dr. Javi Avina  Nurse Practitioner, [facility]      I have reviewed the notes, assessments, and/or procedures performed by Aide MONTELONGO, as above.  I have personally interviewed and examined the patient at the beside, and rounded with Aide. I concur with her assessment and plan and the documentation of Hannah Montoya.    Javi Avina M.D.  Hematology/Oncology Attending  [facility]  [facility]        Route Chart for Scheduling    Med Onc Chart Routing      Follow up with physician 3 months. CT prior to visit   Follow up with DANIEL    Labs CBC, CMP, TSH and free T4   Lab interval: every 12 weeks     Imaging    CT chest abdomen prior to visit in 12 weeks   Pharmacy appointment    Other referrals

## 2022-03-25 ENCOUNTER — CLINICAL SUPPORT (OUTPATIENT)
Dept: CARDIOLOGY | Facility: HOSPITAL | Age: 67
End: 2022-03-25
Payer: MEDICARE

## 2022-03-25 DIAGNOSIS — Z95.810 PRESENCE OF AUTOMATIC (IMPLANTABLE) CARDIAC DEFIBRILLATOR: ICD-10-CM

## 2022-03-25 PROCEDURE — 93296 REM INTERROG EVL PM/IDS: CPT | Performed by: INTERNAL MEDICINE

## 2022-03-28 DIAGNOSIS — J41.0 SIMPLE CHRONIC BRONCHITIS: ICD-10-CM

## 2022-03-28 NOTE — TELEPHONE ENCOUNTER
No new care gaps identified.  Powered by [employer]. Reference number: [id number].   3/28/2022 5:24:47 PM CDT

## 2022-03-29 RX ORDER — TIOTROPIUM BROMIDE 18 UG/1
CAPSULE ORAL; RESPIRATORY (INHALATION)
Qty: 90 CAPSULE | Refills: 0 | OUTPATIENT
Start: 2022-03-29

## 2022-03-29 NOTE — TELEPHONE ENCOUNTER
[facility] Note  Quick DC. Inappropriate Request   Refill request requires further review by MD: NO   Medication Therapy Plan: Pharmacy is requesting new script(s) for the following medications without required information, (sig/ frequency/qty/etc)     [facility] action(s):  Quick Discontinue      Encounter Details:    Pharmacies have been requesting medications for patients without required information, (sig, frequency, qty, etc.). In addition, requests are sent for medication(s) pt. are currently not taking, and medications patients have never taken.    We have spoken to the pharmacies about these request types and advised their teams previously that we are unable to assess these New Script requests and require all details for these requests. This is a known issue and has been reported.       Automatic Epic Generated Protocol Data Below:   Requested Prescriptions     Pending Prescriptions Disp Refills    tiotropium (SPIRIVA WITH HANDIHALER) 18 mcg inhalation capsule [Pharmacy Med Name: SPIRIVA HANDIHALER 18MCG CAP INH] 90 capsule 0            Appointments      Date Provider   Last Visit   9/15/2021 Emanuel Chavez MD   Next Visit   Visit date not found Emanuel Chavez MD        Note composed:6:39 AM 03/29/2022

## 2022-04-02 DIAGNOSIS — E11.9 TYPE 2 DIABETES MELLITUS WITHOUT COMPLICATION, WITHOUT LONG-TERM CURRENT USE OF INSULIN: ICD-10-CM

## 2022-04-02 NOTE — TELEPHONE ENCOUNTER
No new care gaps identified.  Powered by [employer]. Reference number: [id number].   4/02/2022 1:28:20 PM CDT

## 2022-04-04 ENCOUNTER — PATIENT MESSAGE (OUTPATIENT)
Dept: FAMILY MEDICINE | Facility: CLINIC | Age: 67
End: 2022-04-04
Payer: MEDICARE

## 2022-04-04 DIAGNOSIS — J41.0 SIMPLE CHRONIC BRONCHITIS: ICD-10-CM

## 2022-04-04 RX ORDER — METFORMIN HYDROCHLORIDE 500 MG/1
TABLET ORAL
Qty: 180 TABLET | Refills: 1 | Status: SHIPPED | OUTPATIENT
Start: 2022-04-04 | End: 2022-09-01

## 2022-04-04 RX ORDER — TIOTROPIUM BROMIDE 18 UG/1
CAPSULE ORAL; RESPIRATORY (INHALATION)
Qty: 90 CAPSULE | Refills: 0 | OUTPATIENT
Start: 2022-04-04

## 2022-04-04 NOTE — TELEPHONE ENCOUNTER
No new care gaps identified.  Powered by [employer]. Reference number: [id number].   4/04/2022 9:03:54 AM CDT

## 2022-04-04 NOTE — TELEPHONE ENCOUNTER
[facility] Note  Quick DC. Inappropriate Request   Refill request requires further review by MD: NO   Medication Therapy Plan: Pharmacy is requesting new script(s) for the following medications without required information, (sig/ frequency/qty/etc)     [facility] action(s):  Quick Discontinue      Duplicate Pended Encounter(s)/ Last Prescribed Details:    Pharmacies have been requesting medications for patients without required information, (sig, frequency, qty, etc.). In addition, requests are sent for medication(s) pt. are currently not taking, and medications patients have never taken.    We have spoken to the pharmacies about these request types and advised their teams previously that we are unable to assess these New Script requests and require all details for these requests. This is a known issue and has been reported.        Medication related problems are not assessed for [facility]DC.   Medication Reconciliation Completed? NO Were there pending details that required adjustment? NO     Automatic Epic Generated Protocol Data Below:   Requested Prescriptions   Pending Prescriptions Disp Refills    tiotropium (SPIRIVA WITH HANDIHALER) 18 mcg inhalation capsule [Pharmacy Med Name: SPIRIVA HANDIHALER 18MCG CAP INH] 90 capsule 0              Appointments      Date Provider   Last Visit   9/15/2021 Emanuel Chavez MD   Next Visit   Visit date not found Emanuel Chavez MD        Note composed:9:47 AM 04/04/2022

## 2022-04-04 NOTE — TELEPHONE ENCOUNTER
Refill Routing Note   Medication(s) are not appropriate for processing by [facility] for the following reason(s):      - Required laboratory values are outdated    [facility] action(s):  Defer          Medication reconciliation completed: No     Appointments  past 12m or future 3m with PCP    Date Provider   Last Visit   9/15/2021 Emanuel Chavez MD   Next Visit   4/4/2022 Emanuel Chavez MD   ED visits in past 90 days: 0        Note composed:3:24 PM 04/04/2022

## 2022-04-04 NOTE — TELEPHONE ENCOUNTER
Provider Staff- Action is required     If a refill request needs assessment, Please pend appropriate medication(s) for patient and route the refill request to the Centralized Refill Staff Pool for assessment. Request sent out without a SIG from provider staff, please advise.    If medication is not pended as a Refill Request the data required for the Refill Center to assess will not generate and the medications will not be able to be assessed properly by the Refill Center.     Thank you for your assistance!   [facility]     Note composed:9:46 AM 04/04/2022

## 2022-04-06 DIAGNOSIS — J41.0 SIMPLE CHRONIC BRONCHITIS: ICD-10-CM

## 2022-04-06 RX ORDER — TIOTROPIUM BROMIDE 18 UG/1
CAPSULE ORAL; RESPIRATORY (INHALATION)
Qty: 90 CAPSULE | Refills: 0 | Status: SHIPPED | OUTPATIENT
Start: 2022-04-06 | End: 2022-07-04

## 2022-04-06 NOTE — TELEPHONE ENCOUNTER
No new care gaps identified.  Powered by [employer]. Reference number: [id number].   4/06/2022 4:29:57 PM CDT

## 2022-04-08 DIAGNOSIS — I10 BENIGN ESSENTIAL HTN: ICD-10-CM

## 2022-04-08 NOTE — TELEPHONE ENCOUNTER
No new care gaps identified.  Powered by [employer]. Reference number: [id number].   4/08/2022 4:55:13 AM CDT

## 2022-04-09 RX ORDER — AMLODIPINE BESYLATE 5 MG/1
TABLET ORAL
Qty: 90 TABLET | Refills: 1 | Status: SHIPPED | OUTPATIENT
Start: 2022-04-09 | End: 2022-11-10

## 2022-04-10 NOTE — TELEPHONE ENCOUNTER
Refill Authorization Note   Hannah Montoya  is requesting a refill authorization.  Brief Assessment and Rationale for Refill:  Approve     Medication Therapy Plan:       Medication Reconciliation Completed: No   Comments:     No Care Gaps recommended.     Note composed:9:32 PM 04/09/2022

## 2022-05-23 ENCOUNTER — PATIENT MESSAGE (OUTPATIENT)
Dept: ADMINISTRATIVE | Facility: HOSPITAL | Age: 67
End: 2022-05-23
Payer: MEDICARE

## 2022-05-23 ENCOUNTER — PATIENT OUTREACH (OUTPATIENT)
Dept: ADMINISTRATIVE | Facility: HOSPITAL | Age: 67
End: 2022-05-23
Payer: MEDICARE

## 2022-05-23 DIAGNOSIS — E11.9 TYPE 2 DIABETES MELLITUS WITHOUT COMPLICATION, WITHOUT LONG-TERM CURRENT USE OF INSULIN: Primary | ICD-10-CM

## 2022-05-23 NOTE — PROGRESS NOTES
[facility] Non-Compliant Colorectal Screening gap report - Left message for patient to call our office.    Overdue Diabetes Labs w/ Urine and PCP visit needed - Left message for patient to call our office. Please schedule.    Eye exam updated.

## 2022-06-01 ENCOUNTER — PATIENT MESSAGE (OUTPATIENT)
Dept: ADMINISTRATIVE | Facility: HOSPITAL | Age: 67
End: 2022-06-01
Payer: MEDICARE

## 2022-06-03 DIAGNOSIS — F43.21 GRIEF REACTION: ICD-10-CM

## 2022-06-03 RX ORDER — SERTRALINE HYDROCHLORIDE 100 MG/1
100 TABLET, FILM COATED ORAL NIGHTLY
Qty: 90 TABLET | Refills: 0 | Status: SHIPPED | OUTPATIENT
Start: 2022-06-03 | End: 2022-10-24

## 2022-06-03 NOTE — TELEPHONE ENCOUNTER
Care Due:                  Date            Visit Type   Department     Provider  --------------------------------------------------------------------------------                                [facility]                              PRIMARY      MEDICINE/  Last Visit: 09-      CARE ([facility])   INTERNAL MED   Emanuel A  Page  Next Visit: None Scheduled  None         None Found                                                            Last  Test          Frequency    Reason                     Performed    Due Date  --------------------------------------------------------------------------------    HBA1C.......  6 months...  metFORMIN................  08- 01-    Lipid Panel.  12 months..  atorvastatin.............  08- 07-    [employer] Embedded Care Gaps. Reference number: [id number]. 6/03/2022   2:23:40 AM CDT

## 2022-06-03 NOTE — TELEPHONE ENCOUNTER
Refill Routing Note   Medication(s) are not appropriate for processing by [facility] for the following reason(s):      - Indication is outside of scope for [facility]    [facility] action(s):  Defer Medication-related problems identified:   Requires labs  Requires appointment        Medication reconciliation completed: No     Appointments  past 12m or future 3m with PCP    Date Provider   Last Visit   9/15/2021 Emanuel Chavez MD   Next Visit   Visit date not found Emanuel Chavez MD   ED visits in past 90 days: 0        Note composed:12:22 PM 06/03/2022

## 2022-06-30 ENCOUNTER — OFFICE VISIT (OUTPATIENT)
Dept: HEMATOLOGY/ONCOLOGY | Facility: CLINIC | Age: 67
End: 2022-06-30
Payer: MEDICARE

## 2022-06-30 ENCOUNTER — TELEPHONE (OUTPATIENT)
Dept: HEMATOLOGY/ONCOLOGY | Facility: CLINIC | Age: 67
End: 2022-06-30

## 2022-06-30 ENCOUNTER — LAB VISIT (OUTPATIENT)
Dept: LAB | Facility: HOSPITAL | Age: 67
End: 2022-06-30
Attending: INTERNAL MEDICINE
Payer: MEDICARE

## 2022-06-30 VITALS
HEART RATE: 79 BPM | BODY MASS INDEX: 36.87 KG/M2 | HEIGHT: 62 IN | OXYGEN SATURATION: 94 % | SYSTOLIC BLOOD PRESSURE: 138 MMHG | DIASTOLIC BLOOD PRESSURE: 75 MMHG | TEMPERATURE: 98 F | RESPIRATION RATE: 18 BRPM | WEIGHT: 200.38 LBS

## 2022-06-30 DIAGNOSIS — E03.9 HYPOTHYROIDISM, UNSPECIFIED TYPE: ICD-10-CM

## 2022-06-30 DIAGNOSIS — I10 ESSENTIAL HYPERTENSION: ICD-10-CM

## 2022-06-30 DIAGNOSIS — I44.7 LEFT BUNDLE-BRANCH BLOCK: ICD-10-CM

## 2022-06-30 DIAGNOSIS — R79.89 ELEVATED TSH: ICD-10-CM

## 2022-06-30 DIAGNOSIS — I42.0 DILATED CARDIOMYOPATHY: ICD-10-CM

## 2022-06-30 DIAGNOSIS — E11.9 TYPE 2 DIABETES MELLITUS WITHOUT COMPLICATION, WITHOUT LONG-TERM CURRENT USE OF INSULIN: ICD-10-CM

## 2022-06-30 DIAGNOSIS — Z85.3 HISTORY OF BREAST CANCER IN FEMALE: ICD-10-CM

## 2022-06-30 DIAGNOSIS — E03.9 HYPOTHYROIDISM, UNSPECIFIED TYPE: Primary | ICD-10-CM

## 2022-06-30 DIAGNOSIS — C34.91 NSCLC OF RIGHT LUNG: Primary | ICD-10-CM

## 2022-06-30 DIAGNOSIS — C34.91 NSCLC OF RIGHT LUNG: ICD-10-CM

## 2022-06-30 DIAGNOSIS — Z95.810 CARDIAC RESYNCHRONIZATION THERAPY DEFIBRILLATOR (CRT-D) IN PLACE: ICD-10-CM

## 2022-06-30 DIAGNOSIS — R73.9 HYPERGLYCEMIA: ICD-10-CM

## 2022-06-30 DIAGNOSIS — J70.0 RADIATION PNEUMONITIS: ICD-10-CM

## 2022-06-30 LAB
ALBUMIN SERPL BCP-MCNC: 3.7 G/DL (ref 3.5–5.2)
ALP SERPL-CCNC: 52 U/L (ref 55–135)
ALT SERPL W/O P-5'-P-CCNC: 25 U/L (ref 10–44)
ANION GAP SERPL CALC-SCNC: 10 MMOL/L (ref 8–16)
AST SERPL-CCNC: 22 U/L (ref 10–40)
BASOPHILS # BLD AUTO: 0.07 K/UL (ref 0–0.2)
BASOPHILS NFR BLD: 1.2 % (ref 0–1.9)
BILIRUB SERPL-MCNC: 0.4 MG/DL (ref 0.1–1)
BUN SERPL-MCNC: 20 MG/DL (ref 8–23)
CALCIUM SERPL-MCNC: 10.3 MG/DL (ref 8.7–10.5)
CHLORIDE SERPL-SCNC: 106 MMOL/L (ref 95–110)
CO2 SERPL-SCNC: 25 MMOL/L (ref 23–29)
CREAT SERPL-MCNC: 1 MG/DL (ref 0.5–1.4)
DIFFERENTIAL METHOD: ABNORMAL
EOSINOPHIL # BLD AUTO: 0.1 K/UL (ref 0–0.5)
EOSINOPHIL NFR BLD: 2.2 % (ref 0–8)
ERYTHROCYTE [DISTWIDTH] IN BLOOD BY AUTOMATED COUNT: 14.2 % (ref 11.5–14.5)
EST. GFR  (AFRICAN AMERICAN): >60 ML/MIN/1.73 M^2
EST. GFR  (NON AFRICAN AMERICAN): 59.7 ML/MIN/1.73 M^2
ESTIMATED AVG GLUCOSE: 140 MG/DL (ref 68–131)
GLUCOSE SERPL-MCNC: 194 MG/DL (ref 70–110)
HBA1C MFR BLD: 6.5 % (ref 4–5.6)
HCT VFR BLD AUTO: 39.6 % (ref 37–48.5)
HGB BLD-MCNC: 12.9 G/DL (ref 12–16)
IMM GRANULOCYTES # BLD AUTO: 0.05 K/UL (ref 0–0.04)
IMM GRANULOCYTES NFR BLD AUTO: 0.8 % (ref 0–0.5)
LYMPHOCYTES # BLD AUTO: 1.2 K/UL (ref 1–4.8)
LYMPHOCYTES NFR BLD: 19.7 % (ref 18–48)
MCH RBC QN AUTO: 29 PG (ref 27–31)
MCHC RBC AUTO-ENTMCNC: 32.6 G/DL (ref 32–36)
MCV RBC AUTO: 89 FL (ref 82–98)
MONOCYTES # BLD AUTO: 0.6 K/UL (ref 0.3–1)
MONOCYTES NFR BLD: 9.9 % (ref 4–15)
NEUTROPHILS # BLD AUTO: 4 K/UL (ref 1.8–7.7)
NEUTROPHILS NFR BLD: 66.2 % (ref 38–73)
NRBC BLD-RTO: 0 /100 WBC
PLATELET # BLD AUTO: 191 K/UL (ref 150–450)
PMV BLD AUTO: 11.7 FL (ref 9.2–12.9)
POTASSIUM SERPL-SCNC: 4.9 MMOL/L (ref 3.5–5.1)
PROT SERPL-MCNC: 7.1 G/DL (ref 6–8.4)
RBC # BLD AUTO: 4.45 M/UL (ref 4–5.4)
SODIUM SERPL-SCNC: 141 MMOL/L (ref 136–145)
T4 FREE SERPL-MCNC: 0.85 NG/DL (ref 0.71–1.51)
TSH SERPL DL<=0.005 MIU/L-ACNC: 5 UIU/ML (ref 0.4–4)
WBC # BLD AUTO: 6.04 K/UL (ref 3.9–12.7)

## 2022-06-30 PROCEDURE — 1159F PR MEDICATION LIST DOCUMENTED IN MEDICAL RECORD: ICD-10-PCS | Mod: CPTII,S$GLB,, | Performed by: INTERNAL MEDICINE

## 2022-06-30 PROCEDURE — 99999 PR PBB SHADOW E&M-EST. PATIENT-LVL IV: CPT | Mod: PBBFAC,,, | Performed by: INTERNAL MEDICINE

## 2022-06-30 PROCEDURE — 83036 HEMOGLOBIN GLYCOSYLATED A1C: CPT | Performed by: FAMILY MEDICINE

## 2022-06-30 PROCEDURE — 36415 COLL VENOUS BLD VENIPUNCTURE: CPT | Performed by: INTERNAL MEDICINE

## 2022-06-30 PROCEDURE — 3075F PR MOST RECENT SYSTOLIC BLOOD PRESS GE 130-139MM HG: ICD-10-PCS | Mod: CPTII,S$GLB,, | Performed by: INTERNAL MEDICINE

## 2022-06-30 PROCEDURE — 3044F HG A1C LEVEL LT 7.0%: CPT | Mod: CPTII,S$GLB,, | Performed by: INTERNAL MEDICINE

## 2022-06-30 PROCEDURE — 3044F PR MOST RECENT HEMOGLOBIN A1C LEVEL <7.0%: ICD-10-PCS | Mod: CPTII,S$GLB,, | Performed by: INTERNAL MEDICINE

## 2022-06-30 PROCEDURE — 3008F BODY MASS INDEX DOCD: CPT | Mod: CPTII,S$GLB,, | Performed by: INTERNAL MEDICINE

## 2022-06-30 PROCEDURE — 80053 COMPREHEN METABOLIC PANEL: CPT | Performed by: INTERNAL MEDICINE

## 2022-06-30 PROCEDURE — 4010F ACE/ARB THERAPY RXD/TAKEN: CPT | Mod: CPTII,S$GLB,, | Performed by: INTERNAL MEDICINE

## 2022-06-30 PROCEDURE — 3075F SYST BP GE 130 - 139MM HG: CPT | Mod: CPTII,S$GLB,, | Performed by: INTERNAL MEDICINE

## 2022-06-30 PROCEDURE — 99214 PR OFFICE/OUTPT VISIT, EST, LEVL IV, 30-39 MIN: ICD-10-PCS | Mod: S$GLB,,, | Performed by: INTERNAL MEDICINE

## 2022-06-30 PROCEDURE — 99499 UNLISTED E&M SERVICE: CPT | Mod: S$GLB,,, | Performed by: INTERNAL MEDICINE

## 2022-06-30 PROCEDURE — 99499 RISK ADDL DX/OHS AUDIT: ICD-10-PCS | Mod: S$GLB,,, | Performed by: INTERNAL MEDICINE

## 2022-06-30 PROCEDURE — 3078F DIAST BP <80 MM HG: CPT | Mod: CPTII,S$GLB,, | Performed by: INTERNAL MEDICINE

## 2022-06-30 PROCEDURE — 84439 ASSAY OF FREE THYROXINE: CPT | Performed by: INTERNAL MEDICINE

## 2022-06-30 PROCEDURE — 1159F MED LIST DOCD IN RCRD: CPT | Mod: CPTII,S$GLB,, | Performed by: INTERNAL MEDICINE

## 2022-06-30 PROCEDURE — 3078F PR MOST RECENT DIASTOLIC BLOOD PRESSURE < 80 MM HG: ICD-10-PCS | Mod: CPTII,S$GLB,, | Performed by: INTERNAL MEDICINE

## 2022-06-30 PROCEDURE — 4010F PR ACE/ARB THEARPY RXD/TAKEN: ICD-10-PCS | Mod: CPTII,S$GLB,, | Performed by: INTERNAL MEDICINE

## 2022-06-30 PROCEDURE — 99999 PR PBB SHADOW E&M-EST. PATIENT-LVL IV: ICD-10-PCS | Mod: PBBFAC,,, | Performed by: INTERNAL MEDICINE

## 2022-06-30 PROCEDURE — 85025 COMPLETE CBC W/AUTO DIFF WBC: CPT | Performed by: INTERNAL MEDICINE

## 2022-06-30 PROCEDURE — 84443 ASSAY THYROID STIM HORMONE: CPT | Performed by: INTERNAL MEDICINE

## 2022-06-30 PROCEDURE — 3008F PR BODY MASS INDEX (BMI) DOCUMENTED: ICD-10-PCS | Mod: CPTII,S$GLB,, | Performed by: INTERNAL MEDICINE

## 2022-06-30 PROCEDURE — 99214 OFFICE O/P EST MOD 30 MIN: CPT | Mod: S$GLB,,, | Performed by: INTERNAL MEDICINE

## 2022-06-30 NOTE — PROGRESS NOTES
ONCOLOGY FOLLOW UP VISIT.     Reason for visit: Imaging review s/p [id number] clinical trial for stage IIIA NSCLC    Cancer/Stage/TNM:   Cancer Staging  NSCLC of right lung  Staging form: Lung, AJCC 8th Edition  - Clinical stage from 9/29/2020: Stage IIIA (cT3, cN1, cM0) - Signed by Ramo Tucker MD on 10/24/2020       Oncology History   NSCLC of right lung   9/29/2020 Cancer Staged    Staging form: Lung, AJCC 8th Edition  - Clinical stage from 9/29/2020: Stage IIIA (cT3, cN1, cM0)     10/14/2020 Initial Diagnosis    NSCLC of right lung     11/17/2020 - 12/30/2020 Radiation Therapy    Treating physician: Harvinder Vásquez  Technique  Energy  Dose/Fx (Gy)  #Fx  Total Dose (Gy)    RLL Lung  VMAT  6X  2  30 / 30  60              Interval History:   Today, patient reports feeling well overall, no new symptoms. She denies SOB, cough, dizziness, rashes, diarrhea, vision changes, nausea, pain.     ROS:  Review of Systems   Constitutional: Negative for chills, fever and weight loss.   HENT: Negative for hearing loss, nosebleeds and sore throat.    Eyes: Negative for blurred vision and double vision.   Respiratory: Negative for cough, hemoptysis and shortness of breath (Resolved back to baseline).    Cardiovascular: Negative for chest pain and leg swelling.   Gastrointestinal: Negative for abdominal pain, blood in stool, diarrhea, heartburn, nausea and vomiting.   Genitourinary: Negative for dysuria, frequency and hematuria.   Musculoskeletal: Negative for back pain, joint pain and myalgias.   Skin: Negative for itching and rash.   Neurological: Negative for dizziness, tingling, sensory change, speech change and headaches.   Endo/Heme/Allergies: Does not bruise/bleed easily.   Psychiatric/Behavioral: The patient is not nervous/anxious.           A complete 12-point review of systems was reviewed and is negative except as mentioned above.     Past Medical History:   Past Medical History:   Diagnosis Date    AICD  (automatic cardioverter/defibrillator) present     Asthma     bronchitis in past    Breast cancer 2016    right    Cardiac pacemaker     Cardiomyopathy     COPD (chronic obstructive pulmonary disease)     Diabetes mellitus, type 2     Hyperglycemia     Hyperlipidemia     Hypertension     Malignant neoplasm of overlapping sites of female breast 2/12/2016    Nuclear sclerosis of both eyes 8/12/2020    Respiratory distress 3/12/2020        Allergies:   Review of patient's allergies indicates:   Allergen Reactions    Taxol [paclitaxel]      Hypersensitivity reaction to taxol, symptoms included shortness of breath, nausea, dizziness, flushing     Adhesive Rash     tegaderm burns and blistered skin        Medications:   Current Outpatient Medications   Medication Sig Dispense Refill    albuterol sulfate (PROAIR RESPICLICK) 90 mcg/actuation AePB Inhale 2 puffs into the lungs every 4 (four) hours as needed. Rescue 1 each 5    amLODIPine (NORVASC) 5 MG tablet TAKE 1 TABLET EVERY DAY 90 tablet 1    atorvastatin (LIPITOR) 10 MG tablet TAKE 1 TABLET EVERY DAY 90 tablet 3    blood sugar diagnostic Strp To check BG 3 times daily, to use with insurance preferred meter 200 each 3    buPROPion (WELLBUTRIN XL) 150 MG TB24 tablet TAKE 1 TABLET EVERY DAY 90 tablet 2    cetirizine (ZYRTEC) 10 MG tablet Take 10 mg by mouth every evening.       fluticasone-salmeterol diskus inhaler 250-50 mcg INHALE 1 PUFF TWICE DAILY 180 each 2    losartan (COZAAR) 100 MG tablet TAKE 1 TABLET EVERY DAY 90 tablet 2    metFORMIN (GLUCOPHAGE) 500 MG tablet TAKE 1 TABLET TWICE DAILY 180 tablet 1    metoprolol succinate (TOPROL-XL) 100 MG 24 hr tablet TAKE 1 TABLET EVERY DAY 90 tablet 3    multivitamin (THERAGRAN) per tablet Take 1 tablet by mouth once daily.      pantoprazole (PROTONIX) 40 MG tablet Take 1 tablet (40 mg total) by mouth once daily. 90 tablet 0    pantoprazole (PROTONIX) 40 MG tablet Take 1 tablet (40 mg total) by  "mouth once daily. 90 tablet 3    sertraline (ZOLOFT) 100 MG tablet TAKE 1 TABLET (100 MG TOTAL) BY MOUTH EVERY EVENING. 90 tablet 0    tiotropium (SPIRIVA WITH HANDIHALER) 18 mcg inhalation capsule INHALE THE CONTENTS OF 1 CAPSULE EVERY DAY 90 capsule 0     No current facility-administered medications for this visit.     Facility-Administered Medications Ordered in Other Visits   Medication Dose Route Frequency Provider Last Rate Last Admin    fentaNYL injection 25 mcg  25 mcg Intravenous Q5 Min PRN Keesha Martins MD        haloperidol lactate injection 0.5 mg  0.5 mg Intravenous Once PRN Keesha Martins MD        HYDROmorphone injection 0.2 mg  0.2 mg Intravenous Q5 Min PRN Keesha Martins MD        ondansetron injection 4 mg  4 mg Intravenous Once PRN Keesha Martins MD        sodium chloride 0.9% flush 10 mL  10 mL Intravenous PRN Keesha Martins MD            Physical Exam:   /75 (BP Location: Left arm, Patient Position: Sitting, BP Method: Large (Automatic))   Pulse 79   Temp 98.3 °F (36.8 °C) (Oral)   Resp 18   Ht 5' 2" (1.575 m)   Wt 90.9 kg (200 lb 6.4 oz)   LMP  (LMP Unknown)   SpO2 (!) 94%   BMI 36.65 kg/m²      ECOG Performance Status: (foot note - ECOG PS provided by Eastern Cooperative Oncology Group) 0 - Asymptomatic    Physical Exam  Constitutional:       General: She is not in acute distress.  HENT:      Head: Normocephalic and atraumatic.   Eyes:      Conjunctiva/sclera: Conjunctivae normal.   Cardiovascular:      Rate and Rhythm: Normal rate and regular rhythm.      Heart sounds: No murmur heard.    No friction rub. No gallop.   Pulmonary:      Effort: Pulmonary effort is normal. No respiratory distress.      Breath sounds: Normal breath sounds.   Abdominal:      General: There is no distension.      Palpations: Abdomen is soft.      Tenderness: There is no abdominal tenderness.   Musculoskeletal:         General: Normal range of motion.      Cervical back: Normal range of motion and neck supple. "   Skin:     General: Skin is warm and dry.      Findings: No rash.   Neurological:      Mental Status: She is alert and oriented to person, place, and time.   Psychiatric:         Mood and Affect: Affect normal.         Judgment: Judgment normal.                 Labs:   Recent Results (from the past 48 hour(s))   Hemoglobin A1C    Collection Time: 06/30/22  8:56 AM   Result Value Ref Range    Hemoglobin A1C 6.5 (H) 4.0 - 5.6 %    Estimated Avg Glucose 140 (H) 68 - 131 mg/dL   CBC Auto Differential    Collection Time: 06/30/22  8:56 AM   Result Value Ref Range    WBC 6.04 3.90 - 12.70 K/uL    RBC 4.45 4.00 - 5.40 M/uL    Hemoglobin 12.9 12.0 - 16.0 g/dL    Hematocrit 39.6 37.0 - 48.5 %    MCV 89 82 - 98 fL    MCH 29.0 27.0 - 31.0 pg    MCHC 32.6 32.0 - 36.0 g/dL    RDW 14.2 11.5 - 14.5 %    Platelets 191 150 - 450 K/uL    MPV 11.7 9.2 - 12.9 fL    Immature Granulocytes 0.8 (H) 0.0 - 0.5 %    Gran # (ANC) 4.0 1.8 - 7.7 K/uL    Immature Grans (Abs) 0.05 (H) 0.00 - 0.04 K/uL    Lymph # 1.2 1.0 - 4.8 K/uL    Mono # 0.6 0.3 - 1.0 K/uL    Eos # 0.1 0.0 - 0.5 K/uL    Baso # 0.07 0.00 - 0.20 K/uL    nRBC 0 0 /100 WBC    Gran % 66.2 38.0 - 73.0 %    Lymph % 19.7 18.0 - 48.0 %    Mono % 9.9 4.0 - 15.0 %    Eosinophil % 2.2 0.0 - 8.0 %    Basophil % 1.2 0.0 - 1.9 %    Differential Method Automated    Comprehensive Metabolic Panel    Collection Time: 06/30/22  8:56 AM   Result Value Ref Range    Sodium 141 136 - 145 mmol/L    Potassium 4.9 3.5 - 5.1 mmol/L    Chloride 106 95 - 110 mmol/L    CO2 25 23 - 29 mmol/L    Glucose 194 (H) 70 - 110 mg/dL    BUN 20 8 - 23 mg/dL    Creatinine 1.0 0.5 - 1.4 mg/dL    Calcium 10.3 8.7 - 10.5 mg/dL    Total Protein 7.1 6.0 - 8.4 g/dL    Albumin 3.7 3.5 - 5.2 g/dL    Total Bilirubin 0.4 0.1 - 1.0 mg/dL    Alkaline Phosphatase 52 (L) 55 - 135 U/L    AST 22 10 - 40 U/L    ALT 25 10 - 44 U/L    Anion Gap 10 8 - 16 mmol/L    eGFR if African American >60.0 >60 mL/min/1.73 m^2    eGFR if non African  American 59.7 (A) >60 mL/min/1.73 m^2        Imaging:  CT Chest Abdomen With Contrast (XPD)  Narrative: EXAMINATION:  CT CHEST ABDOMEN WITH CONTRAST (XPD)    CLINICAL HISTORY:  Non-small cell lung cancer (NSCLC), non-metastatic, assess treatment response;RECIST measurements needed. Please compare to baseline scan 11/7/2020. Lesion 1: Right lower lobe mass.  5.1 cm.  Series 2, image 60 Lesion 2: Right hilar node.  1.8 cm short axis. NTL Subcarinal lymph node absent or preseMalignant neoplasm of unspecified part of right bronchus or lung    TECHNIQUE:  Low dose axial images, sagittal and coronal reformations were obtained from the thoracic inlet to the pubic symphysis after IV administration of 85 cc of Omnipaque.    COMPARISON:  Chest CT 12/29/2021 and PET-CT 09/14/2021    FINDINGS:  Chest:    Heart and mediastinal vasculature: Aortic and coronary atherosclerosis, cardiomegaly and pacemaker with transthoracic epicardial leads redemonstrated.    Larissa/Mediastinum: No significant lymphadenopathy    Lungs: Right perihilar and right lung base bronchiectasis, volume loss and posterior right lower lobe coalescent consolidation with adjacent parenchymal scarring appears stable from prior suggestive of sequela of treatment.  Underlying residual neoplastic disease is difficult to exclude with certainty.  Similar although less extensive postinflammatory changes are seen in the lingula.  Compared with the prior study, fluctuating areas of ground-glass and nodular opacity appear overall similar, with some areas improved and some worse.  8 mm left basilar nodule is associated with architectural distortion and scarring.  Largest area of ground-glass change that is improved is in the left lung base with improving left basilar nodular opacities as well.  No pleural effusions.    Abdomen:    Liver: Normal in size and attenuation, with no focal hepatic lesions.    Gallbladder: No calcified gallstones.    Bile Ducts: No evidence of  dilated ducts.    Pancreas: No mass or peripancreatic fat stranding.    Spleen: Unremarkable.    Adrenals: Unremarkable.    Kidneys/ Ureters: Normal in size and location. No hydronephrosis, solid mass, or nephrolithiasis.    GI Tract/Mesentery: Visualized portions unremarkable.    Peritoneal Space: No ascites. No free air.    Retroperitoneum: No significant adenopathy.    Vasculature: Aortic atherosclerosis is present.    Bones: No suspicious lytic or blastic lesions.  Impression: Stable perihilar area of consolidation in the right lung predominantly in the right base which may reflect a combination of treatment related change and or residual disease.    Additional stable fluctuating nodular and ground-glass opacities within the lungs most likely inflammatory in nature    No convincing evidence of metastatic disease in the upper abdomen    Electronically signed by: Serafin Wallace Jr  Date:    03/24/2022  Time:    09:22            Diagnoses:       1. NSCLC of right lung    2. Radiation pneumonitis    3. History of breast cancer in female    4. Dilated cardiomyopathy    5. Left bundle-branch block    6. Cardiac resynchronization therapy defibrillator (CRT-D) in place    7. Essential hypertension    8. Type 2 diabetes mellitus without complication, without long-term current use of insulin    9. Elevated TSH    10. Radiation-induced esophagitis          Assessment and Plan:         1. Stage IIIA NSCLC  Plan is for definitive chemoRT, will need re-staging scans prior.  Reviewed with patient in detail her diagnosis, stage, treatment options, and prognosis (3 year OS 66% vs. 43.5% without durvalumab) with standard of care chemoRT followed by maintenance immunotherapy.  Patient has consented for [id number] clinical trial, a randomized control trial evaluating combination chemoRT + durvalumab followed by maintenance vs. SOC chemoRT -> maintenance.  CT scans 7/2021 with CR.  - patient randomized on [id number] to SOC arm.  Patient  has a good performance status ECOG 0 and life expectancy > 12 weeks.    - CT scans on 3.24.22 continue to show complete response.    - Unfortunately, CT was cancelled d/t national shortage of CT contrast. Scheduling CT CAP without contrast next available. Will continue with q 3 adrian imaging.    2. Grade 2 initially, now resolved. Durvalumab held for 2 week steroid taper. Continue COPD maintenance inhalers and she has duonebs prn.     3. 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      4-7.  Stable, follows with cardiology.  AICD placed, but patient now has recovered EF and only takes lasix prn.  NYHA class I.    8,9 HgbA1c 6.5 today. Continue to f/u with PCP.    10. Mildly elevated, will treat for TSH >10.      Patient was also seen and examined by Dr. Avina. Patient is in agreement with the proposed treatment plan. All questions were answered to the patient's satisfaction. Pt knows to call clinic if anything is needed before the next clinic visit.    Aide Magaña, MSN, APRN, FNP-C  Hematology and Medical Oncology  Nurse Practitioner to Dr. Javi Avina  Nurse Practitioner, [facility]      I have reviewed the notes, assessments, and/or procedures performed by Aide MONTELONGO, as above.  I have personally interviewed and examined the patient at the beside, and rounded with Aide. I concur with her assessment and plan and the documentation of Hannah Montoya.    Javi Avina M.D.  Hematology/Oncology Attending  [place] [facility]  [facility]        Route Chart for Scheduling    Med Onc Chart Routing      Follow up with physician 3 months. CT prior to visit   Follow up with DANIEL    Infusion scheduling note    Injection scheduling note    Labs CBC, CMP, TSH and free T4   Lab interval: every 12 weeks     Imaging CT chest abdomen pelvis   CT chest abdomen pelvis WITH contrast prior to visit in 12 weeks    Pharmacy appointment    Other referrals

## 2022-07-01 ENCOUNTER — HOSPITAL ENCOUNTER (OUTPATIENT)
Dept: RADIOLOGY | Facility: HOSPITAL | Age: 67
Discharge: HOME | End: 2022-07-01
Attending: NURSE PRACTITIONER
Payer: MEDICARE

## 2022-07-01 DIAGNOSIS — C34.91 NSCLC OF RIGHT LUNG: ICD-10-CM

## 2022-07-01 PROCEDURE — 71250 CT THORAX DX C-: CPT | Mod: 26,,, | Performed by: RADIOLOGY

## 2022-07-01 PROCEDURE — 74176 CT ABD & PELVIS W/O CONTRAST: CPT | Mod: 26,,, | Performed by: RADIOLOGY

## 2022-07-01 PROCEDURE — 71250 CT CHEST ABDOMEN PELVIS WITHOUT CONTRAST(XPD): ICD-10-PCS | Mod: 26,,, | Performed by: RADIOLOGY

## 2022-07-01 PROCEDURE — 74176 CT CHEST ABDOMEN PELVIS WITHOUT CONTRAST(XPD): ICD-10-PCS | Mod: 26,,, | Performed by: RADIOLOGY

## 2022-07-01 PROCEDURE — 74176 CT ABD & PELVIS W/O CONTRAST: CPT | Mod: TC

## 2022-07-07 ENCOUNTER — PATIENT MESSAGE (OUTPATIENT)
Dept: HEMATOLOGY/ONCOLOGY | Facility: CLINIC | Age: 67
End: 2022-07-07
Payer: MEDICARE

## 2022-07-13 DIAGNOSIS — E11.9 TYPE 2 DIABETES MELLITUS WITHOUT COMPLICATION: ICD-10-CM

## 2022-08-03 ENCOUNTER — CLINICAL SUPPORT (OUTPATIENT)
Dept: CARDIOLOGY | Facility: HOSPITAL | Age: 67
End: 2022-08-03
Payer: MEDICARE

## 2022-08-03 DIAGNOSIS — Z95.810 PRESENCE OF AUTOMATIC (IMPLANTABLE) CARDIAC DEFIBRILLATOR: ICD-10-CM

## 2022-08-03 PROCEDURE — 93295 CARDIAC DEVICE CHECK - REMOTE: ICD-10-PCS | Mod: ,,, | Performed by: INTERNAL MEDICINE

## 2022-08-03 PROCEDURE — 93296 REM INTERROG EVL PM/IDS: CPT | Performed by: INTERNAL MEDICINE

## 2022-08-03 PROCEDURE — 93295 DEV INTERROG REMOTE 1/2/MLT: CPT | Mod: ,,, | Performed by: INTERNAL MEDICINE

## 2022-08-09 ENCOUNTER — TELEPHONE (OUTPATIENT)
Dept: ELECTROPHYSIOLOGY | Facility: CLINIC | Age: 67
End: 2022-08-09
Payer: MEDICARE

## 2022-08-09 NOTE — TELEPHONE ENCOUNTER
----- Message from Scott Lr RN sent at 8/9/2022  3:12 PM CDT -----  Patient is overdue for her annual with ZARINA or MB (last seen Nov 2020) with a device check.  She has a MDT CRTD.  Please arrange her follow up.  Thanks.

## 2022-08-10 ENCOUNTER — PATIENT MESSAGE (OUTPATIENT)
Dept: FAMILY MEDICINE | Facility: CLINIC | Age: 67
End: 2022-08-10
Payer: MEDICARE

## 2022-08-10 ENCOUNTER — TELEPHONE (OUTPATIENT)
Dept: ELECTROPHYSIOLOGY | Facility: CLINIC | Age: 67
End: 2022-08-10
Payer: MEDICARE

## 2022-08-10 ENCOUNTER — TELEPHONE (OUTPATIENT)
Dept: FAMILY MEDICINE | Facility: CLINIC | Age: 67
End: 2022-08-10
Payer: MEDICARE

## 2022-08-10 DIAGNOSIS — Z12.39 BREAST CANCER SCREENING OTHER THAN MAMMOGRAM: ICD-10-CM

## 2022-08-10 DIAGNOSIS — I10 ESSENTIAL HYPERTENSION: Primary | ICD-10-CM

## 2022-08-10 DIAGNOSIS — Z12.31 ENCOUNTER FOR SCREENING MAMMOGRAM FOR MALIGNANT NEOPLASM OF BREAST: ICD-10-CM

## 2022-08-10 DIAGNOSIS — Z12.31 SCREENING MAMMOGRAM FOR BREAST CANCER: Primary | ICD-10-CM

## 2022-08-10 DIAGNOSIS — I49.8 OTHER CARDIAC ARRHYTHMIA: Primary | ICD-10-CM

## 2022-08-10 DIAGNOSIS — E11.9 TYPE 2 DIABETES MELLITUS WITHOUT COMPLICATION, WITHOUT LONG-TERM CURRENT USE OF INSULIN: ICD-10-CM

## 2022-08-10 NOTE — TELEPHONE ENCOUNTER
Pt returned my call to schedule f/u appointment with SOCRATES Burk. Pt verbally confirmed appointment date/time and thanked me for calling.

## 2022-08-24 ENCOUNTER — PATIENT MESSAGE (OUTPATIENT)
Dept: ADMINISTRATIVE | Facility: HOSPITAL | Age: 67
End: 2022-08-24
Payer: MEDICARE

## 2022-08-31 DIAGNOSIS — Z78.0 ASYMPTOMATIC MENOPAUSAL STATE: ICD-10-CM

## 2022-09-27 ENCOUNTER — HOSPITAL ENCOUNTER (OUTPATIENT)
Dept: RADIOLOGY | Facility: HOSPITAL | Age: 67
Discharge: HOME | End: 2022-09-27
Attending: NURSE PRACTITIONER
Payer: MEDICARE

## 2022-09-27 DIAGNOSIS — C34.91 NSCLC OF RIGHT LUNG: ICD-10-CM

## 2022-09-27 PROCEDURE — 74177 CT ABD & PELVIS W/CONTRAST: CPT | Mod: 26,,, | Performed by: RADIOLOGY

## 2022-09-27 PROCEDURE — 74177 CT ABD & PELVIS W/CONTRAST: CPT | Mod: TC

## 2022-09-27 PROCEDURE — 25500020 PHARM REV CODE 255: Performed by: NURSE PRACTITIONER

## 2022-09-27 PROCEDURE — 74177 CT CHEST ABDOMEN PELVIS WITH CONTRAST (XPD): ICD-10-PCS | Mod: 26,,, | Performed by: RADIOLOGY

## 2022-09-27 PROCEDURE — 71260 CT THORAX DX C+: CPT | Mod: TC

## 2022-09-27 PROCEDURE — 71260 CT THORAX DX C+: CPT | Mod: 26,,, | Performed by: RADIOLOGY

## 2022-09-27 PROCEDURE — 71260 CT CHEST ABDOMEN PELVIS WITH CONTRAST (XPD): ICD-10-PCS | Mod: 26,,, | Performed by: RADIOLOGY

## 2022-09-27 RX ADMIN — IOHEXOL 100 ML: 350 INJECTION, SOLUTION INTRAVENOUS at 10:09

## 2022-09-28 ENCOUNTER — HOSPITAL ENCOUNTER (OUTPATIENT)
Dept: RADIOLOGY | Facility: HOSPITAL | Age: 67
Discharge: HOME | End: 2022-09-28
Attending: FAMILY MEDICINE
Payer: MEDICARE

## 2022-09-28 DIAGNOSIS — Z12.39 BREAST CANCER SCREENING OTHER THAN MAMMOGRAM: ICD-10-CM

## 2022-09-28 DIAGNOSIS — Z12.31 ENCOUNTER FOR SCREENING MAMMOGRAM FOR MALIGNANT NEOPLASM OF BREAST: ICD-10-CM

## 2022-09-28 DIAGNOSIS — E11.9 TYPE 2 DIABETES MELLITUS WITHOUT COMPLICATION, WITHOUT LONG-TERM CURRENT USE OF INSULIN: ICD-10-CM

## 2022-09-28 PROCEDURE — 77063 BREAST TOMOSYNTHESIS BI: CPT | Mod: 26,,, | Performed by: RADIOLOGY

## 2022-09-28 PROCEDURE — 77067 MAMMO DIGITAL SCREENING BILAT WITH TOMO: ICD-10-PCS | Mod: 26,,, | Performed by: RADIOLOGY

## 2022-09-28 PROCEDURE — 77067 SCR MAMMO BI INCL CAD: CPT | Mod: TC,PO

## 2022-09-28 PROCEDURE — 77063 BREAST TOMOSYNTHESIS BI: CPT | Mod: TC,PO

## 2022-09-28 PROCEDURE — 77063 MAMMO DIGITAL SCREENING BILAT WITH TOMO: ICD-10-PCS | Mod: 26,,, | Performed by: RADIOLOGY

## 2022-09-28 PROCEDURE — 77067 SCR MAMMO BI INCL CAD: CPT | Mod: 26,,, | Performed by: RADIOLOGY

## 2022-09-29 ENCOUNTER — OFFICE VISIT (OUTPATIENT)
Dept: HEMATOLOGY/ONCOLOGY | Facility: CLINIC | Age: 67
End: 2022-09-29
Payer: MEDICARE

## 2022-09-29 VITALS
TEMPERATURE: 98 F | DIASTOLIC BLOOD PRESSURE: 64 MMHG | RESPIRATION RATE: 18 BRPM | WEIGHT: 198.44 LBS | HEIGHT: 62 IN | BODY MASS INDEX: 36.52 KG/M2 | OXYGEN SATURATION: 95 % | HEART RATE: 97 BPM | SYSTOLIC BLOOD PRESSURE: 133 MMHG

## 2022-09-29 DIAGNOSIS — C34.91 NSCLC OF RIGHT LUNG: Primary | ICD-10-CM

## 2022-09-29 DIAGNOSIS — I10 ESSENTIAL HYPERTENSION: ICD-10-CM

## 2022-09-29 DIAGNOSIS — Z95.810 CARDIAC RESYNCHRONIZATION THERAPY DEFIBRILLATOR (CRT-D) IN PLACE: ICD-10-CM

## 2022-09-29 DIAGNOSIS — Z85.3 HISTORY OF BREAST CANCER IN FEMALE: ICD-10-CM

## 2022-09-29 DIAGNOSIS — I42.0 DILATED CARDIOMYOPATHY: ICD-10-CM

## 2022-09-29 DIAGNOSIS — I44.7 LEFT BUNDLE-BRANCH BLOCK: ICD-10-CM

## 2022-09-29 DIAGNOSIS — J70.0 RADIATION PNEUMONITIS: ICD-10-CM

## 2022-09-29 PROCEDURE — 3008F PR BODY MASS INDEX (BMI) DOCUMENTED: ICD-10-PCS | Mod: CPTII,S$GLB,, | Performed by: INTERNAL MEDICINE

## 2022-09-29 PROCEDURE — 99215 PR OFFICE/OUTPT VISIT, EST, LEVL V, 40-54 MIN: ICD-10-PCS | Mod: S$GLB,,, | Performed by: INTERNAL MEDICINE

## 2022-09-29 PROCEDURE — 3075F SYST BP GE 130 - 139MM HG: CPT | Mod: CPTII,S$GLB,, | Performed by: INTERNAL MEDICINE

## 2022-09-29 PROCEDURE — 4010F ACE/ARB THERAPY RXD/TAKEN: CPT | Mod: CPTII,S$GLB,, | Performed by: INTERNAL MEDICINE

## 2022-09-29 PROCEDURE — 3044F PR MOST RECENT HEMOGLOBIN A1C LEVEL <7.0%: ICD-10-PCS | Mod: CPTII,S$GLB,, | Performed by: INTERNAL MEDICINE

## 2022-09-29 PROCEDURE — 1101F PT FALLS ASSESS-DOCD LE1/YR: CPT | Mod: CPTII,S$GLB,, | Performed by: INTERNAL MEDICINE

## 2022-09-29 PROCEDURE — 3078F PR MOST RECENT DIASTOLIC BLOOD PRESSURE < 80 MM HG: ICD-10-PCS | Mod: CPTII,S$GLB,, | Performed by: INTERNAL MEDICINE

## 2022-09-29 PROCEDURE — 99499 UNLISTED E&M SERVICE: CPT | Mod: HCNC,S$GLB,, | Performed by: INTERNAL MEDICINE

## 2022-09-29 PROCEDURE — 3008F BODY MASS INDEX DOCD: CPT | Mod: CPTII,S$GLB,, | Performed by: INTERNAL MEDICINE

## 2022-09-29 PROCEDURE — 4010F PR ACE/ARB THEARPY RXD/TAKEN: ICD-10-PCS | Mod: CPTII,S$GLB,, | Performed by: INTERNAL MEDICINE

## 2022-09-29 PROCEDURE — 3288F PR FALLS RISK ASSESSMENT DOCUMENTED: ICD-10-PCS | Mod: CPTII,S$GLB,, | Performed by: INTERNAL MEDICINE

## 2022-09-29 PROCEDURE — 1157F PR ADVANCE CARE PLAN OR EQUIV PRESENT IN MEDICAL RECORD: ICD-10-PCS | Mod: CPTII,S$GLB,, | Performed by: INTERNAL MEDICINE

## 2022-09-29 PROCEDURE — 1159F MED LIST DOCD IN RCRD: CPT | Mod: CPTII,S$GLB,, | Performed by: INTERNAL MEDICINE

## 2022-09-29 PROCEDURE — 99999 PR PBB SHADOW E&M-EST. PATIENT-LVL IV: CPT | Mod: PBBFAC,,, | Performed by: INTERNAL MEDICINE

## 2022-09-29 PROCEDURE — 99499 RISK ADDL DX/OHS AUDIT: ICD-10-PCS | Mod: HCNC,S$GLB,, | Performed by: INTERNAL MEDICINE

## 2022-09-29 PROCEDURE — 1157F ADVNC CARE PLAN IN RCRD: CPT | Mod: CPTII,S$GLB,, | Performed by: INTERNAL MEDICINE

## 2022-09-29 PROCEDURE — 1101F PR PT FALLS ASSESS DOC 0-1 FALLS W/OUT INJ PAST YR: ICD-10-PCS | Mod: CPTII,S$GLB,, | Performed by: INTERNAL MEDICINE

## 2022-09-29 PROCEDURE — 99215 OFFICE O/P EST HI 40 MIN: CPT | Mod: S$GLB,,, | Performed by: INTERNAL MEDICINE

## 2022-09-29 PROCEDURE — 3044F HG A1C LEVEL LT 7.0%: CPT | Mod: CPTII,S$GLB,, | Performed by: INTERNAL MEDICINE

## 2022-09-29 PROCEDURE — 3075F PR MOST RECENT SYSTOLIC BLOOD PRESS GE 130-139MM HG: ICD-10-PCS | Mod: CPTII,S$GLB,, | Performed by: INTERNAL MEDICINE

## 2022-09-29 PROCEDURE — 1159F PR MEDICATION LIST DOCUMENTED IN MEDICAL RECORD: ICD-10-PCS | Mod: CPTII,S$GLB,, | Performed by: INTERNAL MEDICINE

## 2022-09-29 PROCEDURE — 99999 PR PBB SHADOW E&M-EST. PATIENT-LVL IV: ICD-10-PCS | Mod: PBBFAC,,, | Performed by: INTERNAL MEDICINE

## 2022-09-29 PROCEDURE — 3078F DIAST BP <80 MM HG: CPT | Mod: CPTII,S$GLB,, | Performed by: INTERNAL MEDICINE

## 2022-09-29 PROCEDURE — 3288F FALL RISK ASSESSMENT DOCD: CPT | Mod: CPTII,S$GLB,, | Performed by: INTERNAL MEDICINE

## 2022-09-29 NOTE — PROGRESS NOTES
ONCOLOGY FOLLOW UP VISIT.     Reason for visit: Imaging review s/p [id number] clinical trial for stage IIIA NSCLC    Cancer/Stage/TNM:    Cancer Staging   NSCLC of right lung  Staging form: Lung, AJCC 8th Edition  - Clinical stage from 9/29/2020: Stage IIIA (cT3, cN1, cM0) - Signed by Ramo Tucker MD on 10/24/2020       Oncology History   NSCLC of right lung   9/29/2020 Cancer Staged    Staging form: Lung, AJCC 8th Edition  - Clinical stage from 9/29/2020: Stage IIIA (cT3, cN1, cM0)       10/14/2020 Initial Diagnosis    NSCLC of right lung     11/17/2020 - 12/30/2020 Radiation Therapy    Treating physician: Harvinder Vásquez  Technique  Energy  Dose/Fx (Gy)  #Fx  Total Dose (Gy)    RLL Lung  VMAT  6X  2  30 / 30  60            Interval History:   Today, patient reports feeling well overall, no new symptoms. She denies SOB, cough, dizziness, rashes, diarrhea, vision changes, nausea, pain.     ROS:  Review of Systems   Constitutional:  Negative for chills, fever and weight loss.   HENT:  Negative for hearing loss, nosebleeds and sore throat.    Eyes:  Negative for blurred vision and double vision.   Respiratory:  Negative for cough, hemoptysis and shortness of breath (Resolved back to baseline).    Cardiovascular:  Negative for chest pain and leg swelling.   Gastrointestinal:  Negative for abdominal pain, blood in stool, diarrhea, heartburn, nausea and vomiting.   Genitourinary:  Negative for dysuria, frequency and hematuria.   Musculoskeletal:  Negative for back pain, joint pain and myalgias.   Skin:  Negative for itching and rash.   Neurological:  Negative for dizziness, tingling, sensory change, speech change and headaches.   Endo/Heme/Allergies:  Does not bruise/bleed easily.   Psychiatric/Behavioral:  The patient is not nervous/anxious.         A complete 12-point review of systems was reviewed and is negative except as mentioned above.     Past Medical History:   Past Medical History:   Diagnosis Date     AICD (automatic cardioverter/defibrillator) present     Asthma     bronchitis in past    Breast cancer 2016    right    Cardiac pacemaker     Cardiomyopathy     COPD (chronic obstructive pulmonary disease)     Diabetes mellitus, type 2     Hyperglycemia     Hyperlipidemia     Hypertension     Malignant neoplasm of overlapping sites of female breast 2/12/2016    Nuclear sclerosis of both eyes 8/12/2020    Respiratory distress 3/12/2020        Allergies:   Review of patient's allergies indicates:   Allergen Reactions    Taxol [paclitaxel]      Hypersensitivity reaction to taxol, symptoms included shortness of breath, nausea, dizziness, flushing     Adhesive Rash     tegaderm burns and blistered skin        Medications:   Current Outpatient Medications   Medication Sig Dispense Refill    albuterol sulfate (PROAIR RESPICLICK) 90 mcg/actuation AePB Inhale 2 puffs into the lungs every 4 (four) hours as needed. Rescue 1 each 5    amLODIPine (NORVASC) 5 MG tablet TAKE 1 TABLET EVERY DAY 90 tablet 1    atorvastatin (LIPITOR) 10 MG tablet TAKE 1 TABLET EVERY DAY 90 tablet 3    blood sugar diagnostic Strp To check BG 3 times daily, to use with insurance preferred meter 200 each 3    buPROPion (WELLBUTRIN XL) 150 MG TB24 tablet TAKE 1 TABLET EVERY DAY 90 tablet 0    cetirizine (ZYRTEC) 10 MG tablet Take 10 mg by mouth every evening.       fluticasone-salmeterol diskus inhaler 250-50 mcg INHALE 1 PUFF TWICE DAILY 180 each 0    losartan (COZAAR) 100 MG tablet TAKE 1 TABLET EVERY DAY 90 tablet 0    metFORMIN (GLUCOPHAGE) 500 MG tablet TAKE 1 TABLET TWICE DAILY 180 tablet 1    metoprolol succinate (TOPROL-XL) 100 MG 24 hr tablet TAKE 1 TABLET EVERY DAY 90 tablet 3    multivitamin (THERAGRAN) per tablet Take 1 tablet by mouth once daily.      pantoprazole (PROTONIX) 40 MG tablet Take 1 tablet (40 mg total) by mouth once daily. 90 tablet 0    pantoprazole (PROTONIX) 40 MG tablet Take 1 tablet (40 mg total) by mouth once daily. 90  "tablet 3    sertraline (ZOLOFT) 100 MG tablet TAKE 1 TABLET (100 MG TOTAL) BY MOUTH EVERY EVENING. 90 tablet 0    tiotropium (SPIRIVA WITH HANDIHALER) 18 mcg inhalation capsule INHALE 1 CAPSULE BY MOUTH EVERY DAY 90 capsule 0     No current facility-administered medications for this visit.     Facility-Administered Medications Ordered in Other Visits   Medication Dose Route Frequency Provider Last Rate Last Admin    fentaNYL injection 25 mcg  25 mcg Intravenous Q5 Min PRN Keesha Martins MD        haloperidol lactate injection 0.5 mg  0.5 mg Intravenous Once PRN Keesha Martins MD        HYDROmorphone injection 0.2 mg  0.2 mg Intravenous Q5 Min PRN Keesha Martins MD        ondansetron injection 4 mg  4 mg Intravenous Once PRN Keesha Martins MD        sodium chloride 0.9% flush 10 mL  10 mL Intravenous PRN Keesha Martins MD            Physical Exam:   /64 (BP Location: Right arm, Patient Position: Sitting, BP Method: Medium (Automatic))   Pulse 97   Temp 98.3 °F (36.8 °C) (Oral)   Resp 18   Ht 5' 2" (1.575 m)   Wt 90 kg (198 lb 6.6 oz)   LMP  (LMP Unknown)   SpO2 95%   BMI 36.29 kg/m²      ECOG Performance Status: (foot note - ECOG PS provided by Eastern Cooperative Oncology Group) 0 - Asymptomatic    Physical Exam  Constitutional:       General: She is not in acute distress.  HENT:      Head: Normocephalic and atraumatic.   Eyes:      Conjunctiva/sclera: Conjunctivae normal.   Cardiovascular:      Rate and Rhythm: Normal rate and regular rhythm.      Heart sounds: No murmur heard.    No friction rub. No gallop.   Pulmonary:      Effort: Pulmonary effort is normal. No respiratory distress.      Breath sounds: Normal breath sounds.   Abdominal:      General: There is no distension.      Palpations: Abdomen is soft.      Tenderness: There is no abdominal tenderness.   Musculoskeletal:         General: Normal range of motion.      Cervical back: Normal range of motion and neck supple.   Skin:     General: Skin is warm and dry. "      Findings: No rash.   Neurological:      Mental Status: She is alert and oriented to person, place, and time.   Psychiatric:         Mood and Affect: Affect normal.         Judgment: Judgment normal.               Labs:   No results found for this or any previous visit (from the past 48 hour(s)).       Imaging:  CT Chest Abdomen Pelvis With Contrast  Narrative: EXAMINATION:  CT CHEST ABDOMEN PELVIS WITH CONTRAST (XPD)    CLINICAL HISTORY:  re-staging NSCLC; Malignant neoplasm of unspecified part of right bronchus or lung    TECHNIQUE:  Low dose axial images, sagittal and coronal reformations were obtained from the thoracic inlet to the pubic symphysis following the IV administration of 100 mL of Omnipaque 350 .  No oral contrast was administered.    COMPARISON:  07/01/2022.    FINDINGS:  Structures at the base of the neck appear unremarkable.  There is a cardiac pacemaker/defibrillator present with epicardial pacing leads as before.  The heart is not enlarged.  There is a small amount of atherosclerotic calcification within the thoracic aorta which is normal in caliber without aneurysmal dilatation.  No hilar or mediastinal adenopathy is identified.  No axillary adenopathy is identified.  Once again, right perihilar and right lower lobe atelectasis/consolidation is identified with mild bronchiectasis with air bronchograms is present and this is not significantly changed when compared to the prior examination.  8 mm left basilar nodule remains stable (image 377, series 6).  The patient has developed a new 6 mm nodule within the left lower lobe posteromedially (image 419, series 6).  There is no evidence for pneumothorax or pleural effusions.  Bony structures appear intact without evidence for acute fracture or bone destruction.    The liver is mildly enlarged and appears diffusely decreased in density suggestive of fatty changes of the liver.  No focal liver lesions are identified.  The gallbladder is surgically  absent.  There is prominence of the common bile duct likely related to the patient's post cholecystectomy state.  No intrahepatic biliary dilatation is identified.  The spleen, stomach, and pancreas all appear grossly unremarkable.  There is very subtle nodularity of the left adrenal gland which is unchanged.  Right adrenal gland is normal in size.  There is a lobulated contour of the kidneys which may be related to renal parenchymal scarring.  There is no evidence for abnormal renal masses or hydronephrosis.  The kidneys are noted to concentrate contrast symmetrically.  Atherosclerotic calcification is present within the abdominal aorta which tapers normally without aneurysmal dilatation.  No para-aortic lymphadenopathy is identified.  The appendix is present and appears unremarkable.  No dilated loops of bowel are evident.  The uterus is present.  There is a slightly lobulated contour of the uterus likely related to uterine fibroids.  No abnormal adnexal masses are appreciated.  There is no evidence for pelvic or inguinal lymphadenopathy.  No ascites is identified.  There is a tiny fat containing umbilical hernia.  Impression: Stable appearance to the right parahilar/right lower lobe atelectasis/consolidation when compared to the prior examination.    Stable 8 mm left lower lobe pulmonary nodule.    Interval development of new 6 mm nodule within the left lower lobe posteromedially.  Metastatic disease cannot be excluded.    Mild hepatomegaly with fatty changes of the liver.    S/p cholecystectomy.    Subtle nodularity of the left adrenal gland, stable.    Electronically signed by: Jose Lin MD  Date:    09/27/2022  Time:    11:49            Diagnoses:       1. NSCLC of right lung    2. Radiation pneumonitis    3. History of breast cancer in female    4. Dilated cardiomyopathy    5. Left bundle-branch block    6. Cardiac resynchronization therapy defibrillator (CRT-D) in place    7. Essential hypertension             Assessment and Plan:         1. Stage IIIA NSCLC  Plan is for definitive chemoRT, will need re-staging scans prior.  Reviewed with patient in detail her diagnosis, stage, treatment options, and prognosis (3 year OS 66% vs. 43.5% without durvalumab) with standard of care chemoRT followed by maintenance immunotherapy.  Patient has consented for [id number] clinical trial, a randomized control trial evaluating combination chemoRT + durvalumab followed by maintenance vs. SOC chemoRT -> maintenance.  CT scans 7/2021 with CR.  - patient randomized on [id number] to SOC arm (Imfinzi) - completed 12/2021.    - CT scan 9/27/22 shows stable appearance to the right parahilar/right lower lobe atelectasis/consolidation and 8 mm left lower lobe pulmonary nodule. Interval development of new 6 mm nodule within the left lower lobe. Will follow new nodule closely.  - Continue surveillance scans Q3 months x1 year.    2. Grade 2 initially, now resolved. Durvalumab held for 2 week steroid taper. Continue COPD maintenance inhalers and she has duonebs prn.     3. 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      4-7.  Stable, follows with cardiology.  AICD placed, but patient now has recovered EF and only takes lasix prn.  NYHA class I.      Patient was also seen and examined by Dr. Avina. Patient is in agreement with the proposed treatment plan. All questions were answered to the patient's satisfaction. Pt knows to call clinic if anything is needed before the next clinic visit.    Aide Magaña, MSN, APRN, FNP-C  Hematology and Medical Oncology  Nurse Practitioner to Dr. Javi Avina  Nurse Practitioner, [facility]      I have reviewed the notes, assessments, and/or procedures performed by Aide Magaña APRN, as above.  I have personally interviewed and examined the patient at the beside, and rounded with Aide. I concur with her assessment and plan and the documentation of Hannah Sierra  Lindsay.    Javi Avina M.D.  Hematology/Oncology Attending  [facility]  [facility]        Route Chart for Scheduling    Med Onc Chart Routing      Follow up with physician 3 months. CT prior to visit   Follow up with DANIEL    Infusion scheduling note    Injection scheduling note    Labs CBC, CMP, TSH and free T4   Lab interval: every 12 weeks     Imaging CT chest abdomen pelvis   CT chest abdomen pelvis WITH contrast prior to visit in 12 weeks   Pharmacy appointment    Other referrals

## 2022-10-08 ENCOUNTER — PATIENT MESSAGE (OUTPATIENT)
Dept: FAMILY MEDICINE | Facility: CLINIC | Age: 67
End: 2022-10-08
Payer: MEDICARE

## 2022-10-12 DIAGNOSIS — J41.0 SIMPLE CHRONIC BRONCHITIS: ICD-10-CM

## 2022-10-12 NOTE — TELEPHONE ENCOUNTER
your message has been received and forwarded to your provider. You will be notified once your provider has responded. Please allow 24-48 hours for a response. Thank you for choosing [facility] and have a goo

## 2022-10-12 NOTE — PROGRESS NOTES
Ms. Montoya is a patient of Dr. Gates and was last seen in clinic 10/1/2020.      Subjective:   Patient ID:  Hannah Montoya is a 67 y.o. female who presents for follow-up of CRT-D  .     HPI:    Ms. Montoya is a 67 y.o. female with NICM, LBBB, CRT-D, HTN, HLD, DM, hx of rt breast CA here for annual follow up.    Background:    63 yoF NICM, LBBB s/p CRT-D here for second opinion regarding transvenous LV lead placement. She has an attempt at an LV lead 8/10/17 by Dr Lopez. The attempt was complicated by tortuous venous anatomy and inability to pass an LV lead. Venograms show mid and anterolateral branches with tortuous courses. The mid lateral branch was accessed with a 014 wire but the lead could not be advanced. She had an epicardial lead implanted however she has elevated thresholds, 5.0 V @ 1.2 ms. She has expected 1-1.5 y on her battery life giving her overall ~2 years with her current device. She has mild improvement in symptoms. QRS went from 168 to 150 ms with CRT pacing. She is here for a second opinion.     9/2018: Attempt at His bundle lead 5/2/18 resulting in dislodgement. Re-attempt 6/15/18 with successful lead placement. Marked improvement in symptoms. ECG with QRS 84 ms. Normal CRT-D function (His lead in LV port). Echo showed EF of 50-55%.    10/2019: 100% ventricular pacing - LV in His.  Patient feels well with no cardiac complaints.     10/1/2020: Ms. Montoya is doing well from a device perspective with stable lead and device function. No arrhythmia noted. 100% His pacing with narrow QRS. EF 50-55%. No CHF symptoms. Unfortunately she is being evaluated for possible lung CA after concerning PET yesterday. She is scheduled for biopsy later this month. Continue to monitor device.     Update (10/19/2022):    10/14/2020: Lung CA diagnosis. Completed treatment now under surveillance.     Today she says she is feeling well. No cardiac complaints. Ms. Montoya reports no chest pain with  exertion or at rest, palpitations, SOB, SINGH, dizziness, or syncope.    Device Interrogation (10/19/2022) reveals an intrinsic SR with stable lead and device function. LV output increased today in device clinic. No arrhythmias or treated episodes were noted. (STx2, max 9 min)  She paces 0% in the RA and 96.7% in the BiV. Estimated battery longevity 21 months.     I have personally reviewed the patient's EKG today, which shows ASBP with occ PVCs at 92bpm. ME interval is 180. QRS is 76. QTc is 514.    Relevant Cardiac Test Results:    DSE(10/14/2020):  The stress echo portion of this study is negative for myocardial ischemia.  The ECG portion of this study is negative for myocardial ischemia.  During stress, the following significant arrhythmias were observed: occasional PVCs.  The patient's exercise capacity was above average.  The patient reached the end of the protocol.  With normal systolic function. The estimated ejection fraction is 60%.  Grade II diastolic dysfunction.  Normal right ventricular systolic function.  Mild left atrial enlargement.  The estimated PA systolic pressure is 40 mmHg.  Normal central venous pressure (3 mmHg).    Current Outpatient Medications   Medication Sig    ACCU-CHEK ALFRED PLUS TEST STRP Strp USE TO TEST THREE TIMES DAILY    albuterol sulfate (PROAIR RESPICLICK) 90 mcg/actuation AePB Inhale 2 puffs into the lungs every 4 (four) hours as needed. Rescue    amLODIPine (NORVASC) 5 MG tablet TAKE 1 TABLET EVERY DAY    atorvastatin (LIPITOR) 10 MG tablet TAKE 1 TABLET EVERY DAY    buPROPion (WELLBUTRIN XL) 150 MG TB24 tablet TAKE 1 TABLET EVERY DAY    cetirizine (ZYRTEC) 10 MG tablet Take 10 mg by mouth every evening.     fluticasone-salmeterol diskus inhaler 250-50 mcg INHALE 1 PUFF TWICE DAILY    losartan (COZAAR) 100 MG tablet TAKE 1 TABLET EVERY DAY    metFORMIN (GLUCOPHAGE) 500 MG tablet TAKE 1 TABLET TWICE DAILY    metoprolol succinate (TOPROL-XL) 100 MG 24 hr tablet TAKE 1 TABLET  "EVERY DAY    multivitamin (THERAGRAN) per tablet Take 1 tablet by mouth once daily.    pantoprazole (PROTONIX) 40 MG tablet Take 1 tablet (40 mg total) by mouth once daily.    pantoprazole (PROTONIX) 40 MG tablet Take 1 tablet (40 mg total) by mouth once daily.    sertraline (ZOLOFT) 100 MG tablet TAKE 1 TABLET (100 MG TOTAL) BY MOUTH EVERY EVENING.    tiotropium (SPIRIVA WITH HANDIHALER) 18 mcg inhalation capsule INHALE THE CONTENTS OF 1 CAPSULE BY MOUTH EVERY DAY     No current facility-administered medications for this visit.     Facility-Administered Medications Ordered in Other Visits   Medication    fentaNYL injection 25 mcg    haloperidol lactate injection 0.5 mg    HYDROmorphone injection 0.2 mg    ondansetron injection 4 mg    sodium chloride 0.9% flush 10 mL       Review of Systems   Constitutional: Negative for malaise/fatigue.   Cardiovascular:  Negative for chest pain, dyspnea on exertion, irregular heartbeat, leg swelling and palpitations.   Respiratory:  Negative for shortness of breath.    Hematologic/Lymphatic: Negative for bleeding problem.   Skin:  Negative for rash.   Musculoskeletal:  Negative for myalgias.   Gastrointestinal:  Negative for hematemesis, hematochezia and nausea.   Genitourinary:  Negative for hematuria.   Neurological:  Negative for light-headedness.   Psychiatric/Behavioral:  Negative for altered mental status.    Allergic/Immunologic: Negative for persistent infections.     Objective:          /73   Pulse 92   Ht 5' 2" (1.575 m)   Wt 90.8 kg (200 lb 2.8 oz)   LMP  (LMP Unknown)   BMI 36.61 kg/m²     Physical Exam  Vitals and nursing note reviewed.   Constitutional:       Appearance: Normal appearance. She is well-developed.   HENT:      Head: Normocephalic.      Nose: Nose normal.   Eyes:      Pupils: Pupils are equal, round, and reactive to light.   Cardiovascular:      Rate and Rhythm: Normal rate and regular rhythm.   Pulmonary:      Effort: No respiratory " distress.      Breath sounds: Normal breath sounds.   Chest:      Comments: Device to LUCW. Incision and pocket in good repair.    Musculoskeletal:         General: Normal range of motion.   Skin:     General: Skin is warm and dry.      Findings: No erythema.   Neurological:      Mental Status: She is alert and oriented to person, place, and time.   Psychiatric:         Speech: Speech normal.         Behavior: Behavior normal.         Lab Results   Component Value Date     09/27/2022     09/27/2022    K 4.0 09/27/2022    K 4.0 09/27/2022    MG 1.7 05/03/2017    BUN 19 09/27/2022    BUN 19 09/27/2022    CREATININE 1.2 09/27/2022    CREATININE 1.2 09/27/2022    ALT 28 09/27/2022    ALT 28 09/27/2022    AST 28 09/27/2022    AST 28 09/27/2022    HGB 13.2 09/27/2022    HCT 41.1 09/27/2022    TSH 3.127 09/27/2022    LDLCALC 58.0 (L) 09/27/2022       Recent Labs   Lab 05/26/20  1540 11/09/20  1034   INR 0.9 0.9         Assessment:     1. Cardiac resynchronization therapy defibrillator (CRT-D) in place    2. NICM (nonischemic cardiomyopathy)    3. Left bundle-branch block    4. Essential hypertension      Plan:     In summary, Ms. Montoya is a 67 y.o. female with NICM, LBBB, CRT-D, HTN, HLD, DM, hx of rt breast CA here for annual follow up.  Ms. Montoya is doing well from a device perspective with stable lead and device function. No arrhythmia noted. 97% biventricular pacing. No CHF symptoms. DSE 10/2020 showed recovery of LVEF to 60%. She is feeling well.    Continue current medication regimen and device settings.   Follow up in device clinic as scheduled.   Follow up in EP clinic in 1 year, sooner as needed.     *A copy of this note has been sent to Dr. Gates*    Follow up in about 1 year (around 10/19/2023).    ------------------------------------------------------------------    REGINALD Wade, NP-C  Cardiac Electrophysiology

## 2022-10-13 RX ORDER — TIOTROPIUM BROMIDE 18 UG/1
CAPSULE ORAL; RESPIRATORY (INHALATION)
Qty: 90 CAPSULE | Refills: 0 | Status: SHIPPED | OUTPATIENT
Start: 2022-10-13 | End: 2023-01-11

## 2022-10-13 NOTE — TELEPHONE ENCOUNTER
No new care gaps identified.  [facility] Embedded Care Gaps. Reference number: [id number]. 10/13/2022   11:39:13 AM MARVINT

## 2022-10-19 ENCOUNTER — HOSPITAL ENCOUNTER (OUTPATIENT)
Dept: CARDIOLOGY | Facility: CLINIC | Age: 67
Discharge: HOME | End: 2022-10-19
Payer: MEDICARE

## 2022-10-19 ENCOUNTER — OFFICE VISIT (OUTPATIENT)
Dept: ELECTROPHYSIOLOGY | Facility: CLINIC | Age: 67
End: 2022-10-19
Payer: MEDICARE

## 2022-10-19 ENCOUNTER — CLINICAL SUPPORT (OUTPATIENT)
Dept: CARDIOLOGY | Facility: HOSPITAL | Age: 67
End: 2022-10-19
Attending: INTERNAL MEDICINE
Payer: MEDICARE

## 2022-10-19 VITALS
HEART RATE: 92 BPM | BODY MASS INDEX: 36.84 KG/M2 | HEIGHT: 62 IN | WEIGHT: 200.19 LBS | DIASTOLIC BLOOD PRESSURE: 73 MMHG | SYSTOLIC BLOOD PRESSURE: 135 MMHG

## 2022-10-19 DIAGNOSIS — Z95.810 CARDIAC RESYNCHRONIZATION THERAPY DEFIBRILLATOR (CRT-D) IN PLACE: Primary | ICD-10-CM

## 2022-10-19 DIAGNOSIS — I49.8 OTHER CARDIAC ARRHYTHMIA: ICD-10-CM

## 2022-10-19 DIAGNOSIS — I44.7 LEFT BUNDLE-BRANCH BLOCK: ICD-10-CM

## 2022-10-19 DIAGNOSIS — I42.8 NICM (NONISCHEMIC CARDIOMYOPATHY): ICD-10-CM

## 2022-10-19 DIAGNOSIS — I10 ESSENTIAL HYPERTENSION: ICD-10-CM

## 2022-10-19 PROCEDURE — 1160F RVW MEDS BY RX/DR IN RCRD: CPT | Mod: CPTII,S$GLB,, | Performed by: NURSE PRACTITIONER

## 2022-10-19 PROCEDURE — 3075F SYST BP GE 130 - 139MM HG: CPT | Mod: CPTII,S$GLB,, | Performed by: NURSE PRACTITIONER

## 2022-10-19 PROCEDURE — 99214 PR OFFICE/OUTPT VISIT, EST, LEVL IV, 30-39 MIN: ICD-10-PCS | Mod: S$GLB,,, | Performed by: NURSE PRACTITIONER

## 2022-10-19 PROCEDURE — 93284 PRGRMG EVAL IMPLANTABLE DFB: CPT

## 2022-10-19 PROCEDURE — 3288F FALL RISK ASSESSMENT DOCD: CPT | Mod: CPTII,S$GLB,, | Performed by: NURSE PRACTITIONER

## 2022-10-19 PROCEDURE — 1157F PR ADVANCE CARE PLAN OR EQUIV PRESENT IN MEDICAL RECORD: ICD-10-PCS | Mod: CPTII,S$GLB,, | Performed by: NURSE PRACTITIONER

## 2022-10-19 PROCEDURE — 3044F PR MOST RECENT HEMOGLOBIN A1C LEVEL <7.0%: ICD-10-PCS | Mod: CPTII,S$GLB,, | Performed by: NURSE PRACTITIONER

## 2022-10-19 PROCEDURE — 3288F PR FALLS RISK ASSESSMENT DOCUMENTED: ICD-10-PCS | Mod: CPTII,S$GLB,, | Performed by: NURSE PRACTITIONER

## 2022-10-19 PROCEDURE — 93005 RHYTHM STRIP: ICD-10-PCS | Mod: S$GLB,,, | Performed by: INTERNAL MEDICINE

## 2022-10-19 PROCEDURE — 4010F PR ACE/ARB THEARPY RXD/TAKEN: ICD-10-PCS | Mod: CPTII,S$GLB,, | Performed by: NURSE PRACTITIONER

## 2022-10-19 PROCEDURE — 99999 PR PBB SHADOW E&M-EST. PATIENT-LVL IV: CPT | Mod: PBBFAC,,, | Performed by: NURSE PRACTITIONER

## 2022-10-19 PROCEDURE — 1159F PR MEDICATION LIST DOCUMENTED IN MEDICAL RECORD: ICD-10-PCS | Mod: CPTII,S$GLB,, | Performed by: NURSE PRACTITIONER

## 2022-10-19 PROCEDURE — 93010 ELECTROCARDIOGRAM REPORT: CPT | Mod: S$GLB,,, | Performed by: INTERNAL MEDICINE

## 2022-10-19 PROCEDURE — 3078F PR MOST RECENT DIASTOLIC BLOOD PRESSURE < 80 MM HG: ICD-10-PCS | Mod: CPTII,S$GLB,, | Performed by: NURSE PRACTITIONER

## 2022-10-19 PROCEDURE — 3075F PR MOST RECENT SYSTOLIC BLOOD PRESS GE 130-139MM HG: ICD-10-PCS | Mod: CPTII,S$GLB,, | Performed by: NURSE PRACTITIONER

## 2022-10-19 PROCEDURE — 4010F ACE/ARB THERAPY RXD/TAKEN: CPT | Mod: CPTII,S$GLB,, | Performed by: NURSE PRACTITIONER

## 2022-10-19 PROCEDURE — 1101F PT FALLS ASSESS-DOCD LE1/YR: CPT | Mod: CPTII,S$GLB,, | Performed by: NURSE PRACTITIONER

## 2022-10-19 PROCEDURE — 93284 CARDIAC DEVICE CHECK - IN CLINIC & HOSPITAL: ICD-10-PCS | Mod: 26,,, | Performed by: INTERNAL MEDICINE

## 2022-10-19 PROCEDURE — 3078F DIAST BP <80 MM HG: CPT | Mod: CPTII,S$GLB,, | Performed by: NURSE PRACTITIONER

## 2022-10-19 PROCEDURE — 1157F ADVNC CARE PLAN IN RCRD: CPT | Mod: CPTII,S$GLB,, | Performed by: NURSE PRACTITIONER

## 2022-10-19 PROCEDURE — 3044F HG A1C LEVEL LT 7.0%: CPT | Mod: CPTII,S$GLB,, | Performed by: NURSE PRACTITIONER

## 2022-10-19 PROCEDURE — 99214 OFFICE O/P EST MOD 30 MIN: CPT | Mod: S$GLB,,, | Performed by: NURSE PRACTITIONER

## 2022-10-19 PROCEDURE — 1160F PR REVIEW ALL MEDS BY PRESCRIBER/CLIN PHARMACIST DOCUMENTED: ICD-10-PCS | Mod: CPTII,S$GLB,, | Performed by: NURSE PRACTITIONER

## 2022-10-19 PROCEDURE — 1159F MED LIST DOCD IN RCRD: CPT | Mod: CPTII,S$GLB,, | Performed by: NURSE PRACTITIONER

## 2022-10-19 PROCEDURE — 1101F PR PT FALLS ASSESS DOC 0-1 FALLS W/OUT INJ PAST YR: ICD-10-PCS | Mod: CPTII,S$GLB,, | Performed by: NURSE PRACTITIONER

## 2022-10-19 PROCEDURE — 93284 PRGRMG EVAL IMPLANTABLE DFB: CPT | Mod: 26,,, | Performed by: INTERNAL MEDICINE

## 2022-10-19 PROCEDURE — 93010 RHYTHM STRIP: ICD-10-PCS | Mod: S$GLB,,, | Performed by: INTERNAL MEDICINE

## 2022-10-19 PROCEDURE — 99999 PR PBB SHADOW E&M-EST. PATIENT-LVL IV: ICD-10-PCS | Mod: PBBFAC,,, | Performed by: NURSE PRACTITIONER

## 2022-10-19 PROCEDURE — 93005 ELECTROCARDIOGRAM TRACING: CPT | Mod: S$GLB,,, | Performed by: INTERNAL MEDICINE

## 2022-10-31 ENCOUNTER — PATIENT OUTREACH (OUTPATIENT)
Dept: ADMINISTRATIVE | Facility: HOSPITAL | Age: 67
End: 2022-10-31
Payer: MEDICARE

## 2022-10-31 NOTE — PROGRESS NOTES
[facility] [employer] Oct 2022 Diabetic Eye Exam - compliant 2021 eye exam , 2022 eye exam scheduled on 11/14/22.

## 2022-11-09 ENCOUNTER — CLINICAL SUPPORT (OUTPATIENT)
Dept: CARDIOLOGY | Facility: HOSPITAL | Age: 67
End: 2022-11-09
Payer: MEDICARE

## 2022-11-09 DIAGNOSIS — Z95.810 PRESENCE OF AUTOMATIC (IMPLANTABLE) CARDIAC DEFIBRILLATOR: ICD-10-CM

## 2022-11-09 PROCEDURE — 93295 CARDIAC DEVICE CHECK - REMOTE: ICD-10-PCS | Mod: ,,, | Performed by: INTERNAL MEDICINE

## 2022-11-09 PROCEDURE — 93295 DEV INTERROG REMOTE 1/2/MLT: CPT | Mod: ,,, | Performed by: INTERNAL MEDICINE

## 2022-11-09 PROCEDURE — 93296 REM INTERROG EVL PM/IDS: CPT | Performed by: INTERNAL MEDICINE

## 2022-11-25 ENCOUNTER — TELEPHONE (OUTPATIENT)
Dept: FAMILY MEDICINE | Facility: CLINIC | Age: 67
End: 2022-11-25
Payer: MEDICARE

## 2022-11-25 ENCOUNTER — PATIENT MESSAGE (OUTPATIENT)
Dept: FAMILY MEDICINE | Facility: CLINIC | Age: 67
End: 2022-11-25
Payer: MEDICARE

## 2022-11-25 VITALS — SYSTOLIC BLOOD PRESSURE: 124 MMHG | DIASTOLIC BLOOD PRESSURE: 79 MMHG

## 2022-11-30 ENCOUNTER — HOSPITAL ENCOUNTER (OUTPATIENT)
Dept: RADIOLOGY | Facility: CLINIC | Age: 67
Discharge: HOME | End: 2022-11-30
Attending: FAMILY MEDICINE
Payer: MEDICARE

## 2022-11-30 DIAGNOSIS — Z78.0 ASYMPTOMATIC MENOPAUSAL STATE: ICD-10-CM

## 2022-11-30 PROCEDURE — 77080 DXA BONE DENSITY AXIAL: CPT | Mod: TC

## 2022-11-30 PROCEDURE — 77080 DXA BONE DENSITY AXIAL: CPT | Mod: 26,HCNC,, | Performed by: INTERNAL MEDICINE

## 2022-11-30 PROCEDURE — 77080 DEXA BONE DENSITY SPINE HIP: ICD-10-PCS | Mod: 26,HCNC,, | Performed by: INTERNAL MEDICINE

## 2022-12-01 ENCOUNTER — OFFICE VISIT (OUTPATIENT)
Dept: FAMILY MEDICINE | Facility: CLINIC | Age: 67
End: 2022-12-01
Payer: MEDICARE

## 2022-12-01 VITALS
TEMPERATURE: 98 F | OXYGEN SATURATION: 96 % | RESPIRATION RATE: 18 BRPM | DIASTOLIC BLOOD PRESSURE: 80 MMHG | HEIGHT: 62 IN | BODY MASS INDEX: 36.87 KG/M2 | SYSTOLIC BLOOD PRESSURE: 128 MMHG | HEART RATE: 88 BPM | WEIGHT: 200.38 LBS

## 2022-12-01 DIAGNOSIS — I10 BENIGN ESSENTIAL HTN: ICD-10-CM

## 2022-12-01 DIAGNOSIS — F43.21 GRIEF REACTION: ICD-10-CM

## 2022-12-01 DIAGNOSIS — F32.9 REACTIVE DEPRESSION: ICD-10-CM

## 2022-12-01 DIAGNOSIS — E66.01 CLASS 2 SEVERE OBESITY DUE TO EXCESS CALORIES WITH SERIOUS COMORBIDITY AND BODY MASS INDEX (BMI) OF 36.0 TO 36.9 IN ADULT: Primary | ICD-10-CM

## 2022-12-01 DIAGNOSIS — T66.XXXA RADIATION-INDUCED ESOPHAGITIS: ICD-10-CM

## 2022-12-01 DIAGNOSIS — Z23 IMMUNIZATION DUE: ICD-10-CM

## 2022-12-01 DIAGNOSIS — J44.1 COPD WITH ACUTE EXACERBATION: ICD-10-CM

## 2022-12-01 DIAGNOSIS — E11.9 TYPE 2 DIABETES MELLITUS WITHOUT COMPLICATION, WITHOUT LONG-TERM CURRENT USE OF INSULIN: ICD-10-CM

## 2022-12-01 DIAGNOSIS — K20.80 RADIATION-INDUCED ESOPHAGITIS: ICD-10-CM

## 2022-12-01 PROCEDURE — 1159F MED LIST DOCD IN RCRD: CPT | Mod: CPTII,S$GLB,, | Performed by: FAMILY MEDICINE

## 2022-12-01 PROCEDURE — 4010F PR ACE/ARB THEARPY RXD/TAKEN: ICD-10-PCS | Mod: CPTII,S$GLB,, | Performed by: FAMILY MEDICINE

## 2022-12-01 PROCEDURE — 90732 PPSV23 VACC 2 YRS+ SUBQ/IM: CPT | Mod: S$GLB,,, | Performed by: FAMILY MEDICINE

## 2022-12-01 PROCEDURE — 99999 PR PBB SHADOW E&M-EST. PATIENT-LVL V: ICD-10-PCS | Mod: PBBFAC,,, | Performed by: FAMILY MEDICINE

## 2022-12-01 PROCEDURE — 1157F ADVNC CARE PLAN IN RCRD: CPT | Mod: CPTII,S$GLB,, | Performed by: FAMILY MEDICINE

## 2022-12-01 PROCEDURE — 3044F HG A1C LEVEL LT 7.0%: CPT | Mod: CPTII,S$GLB,, | Performed by: FAMILY MEDICINE

## 2022-12-01 PROCEDURE — 3044F PR MOST RECENT HEMOGLOBIN A1C LEVEL <7.0%: ICD-10-PCS | Mod: CPTII,S$GLB,, | Performed by: FAMILY MEDICINE

## 2022-12-01 PROCEDURE — G0009 ADMIN PNEUMOCOCCAL VACCINE: HCPCS | Mod: S$GLB,,, | Performed by: FAMILY MEDICINE

## 2022-12-01 PROCEDURE — 1157F PR ADVANCE CARE PLAN OR EQUIV PRESENT IN MEDICAL RECORD: ICD-10-PCS | Mod: CPTII,S$GLB,, | Performed by: FAMILY MEDICINE

## 2022-12-01 PROCEDURE — 1101F PT FALLS ASSESS-DOCD LE1/YR: CPT | Mod: CPTII,S$GLB,, | Performed by: FAMILY MEDICINE

## 2022-12-01 PROCEDURE — 3079F PR MOST RECENT DIASTOLIC BLOOD PRESSURE 80-89 MM HG: ICD-10-PCS | Mod: CPTII,S$GLB,, | Performed by: FAMILY MEDICINE

## 2022-12-01 PROCEDURE — 1159F PR MEDICATION LIST DOCUMENTED IN MEDICAL RECORD: ICD-10-PCS | Mod: CPTII,S$GLB,, | Performed by: FAMILY MEDICINE

## 2022-12-01 PROCEDURE — 1160F RVW MEDS BY RX/DR IN RCRD: CPT | Mod: CPTII,S$GLB,, | Performed by: FAMILY MEDICINE

## 2022-12-01 PROCEDURE — 3074F PR MOST RECENT SYSTOLIC BLOOD PRESSURE < 130 MM HG: ICD-10-PCS | Mod: CPTII,S$GLB,, | Performed by: FAMILY MEDICINE

## 2022-12-01 PROCEDURE — 3288F PR FALLS RISK ASSESSMENT DOCUMENTED: ICD-10-PCS | Mod: CPTII,S$GLB,, | Performed by: FAMILY MEDICINE

## 2022-12-01 PROCEDURE — 4010F ACE/ARB THERAPY RXD/TAKEN: CPT | Mod: CPTII,S$GLB,, | Performed by: FAMILY MEDICINE

## 2022-12-01 PROCEDURE — 1160F PR REVIEW ALL MEDS BY PRESCRIBER/CLIN PHARMACIST DOCUMENTED: ICD-10-PCS | Mod: CPTII,S$GLB,, | Performed by: FAMILY MEDICINE

## 2022-12-01 PROCEDURE — 3288F FALL RISK ASSESSMENT DOCD: CPT | Mod: CPTII,S$GLB,, | Performed by: FAMILY MEDICINE

## 2022-12-01 PROCEDURE — 90732 PNEUMOCOCCAL POLYSACCHARIDE VACCINE 23-VALENT =>2YO SQ IM: ICD-10-PCS | Mod: S$GLB,,, | Performed by: FAMILY MEDICINE

## 2022-12-01 PROCEDURE — 3079F DIAST BP 80-89 MM HG: CPT | Mod: CPTII,S$GLB,, | Performed by: FAMILY MEDICINE

## 2022-12-01 PROCEDURE — 99397 PR PREVENTIVE VISIT,EST,65 & OVER: ICD-10-PCS | Mod: 25,S$GLB,, | Performed by: FAMILY MEDICINE

## 2022-12-01 PROCEDURE — 3008F BODY MASS INDEX DOCD: CPT | Mod: CPTII,S$GLB,, | Performed by: FAMILY MEDICINE

## 2022-12-01 PROCEDURE — G0009 PNEUMOCOCCAL POLYSACCHARIDE VACCINE 23-VALENT =>2YO SQ IM: ICD-10-PCS | Mod: S$GLB,,, | Performed by: FAMILY MEDICINE

## 2022-12-01 PROCEDURE — 99999 PR PBB SHADOW E&M-EST. PATIENT-LVL V: CPT | Mod: PBBFAC,,, | Performed by: FAMILY MEDICINE

## 2022-12-01 PROCEDURE — 3074F SYST BP LT 130 MM HG: CPT | Mod: CPTII,S$GLB,, | Performed by: FAMILY MEDICINE

## 2022-12-01 PROCEDURE — 3008F PR BODY MASS INDEX (BMI) DOCUMENTED: ICD-10-PCS | Mod: CPTII,S$GLB,, | Performed by: FAMILY MEDICINE

## 2022-12-01 PROCEDURE — 1101F PR PT FALLS ASSESS DOC 0-1 FALLS W/OUT INJ PAST YR: ICD-10-PCS | Mod: CPTII,S$GLB,, | Performed by: FAMILY MEDICINE

## 2022-12-01 PROCEDURE — 99397 PER PM REEVAL EST PAT 65+ YR: CPT | Mod: 25,S$GLB,, | Performed by: FAMILY MEDICINE

## 2022-12-01 RX ORDER — PANTOPRAZOLE SODIUM 40 MG/1
40 TABLET, DELAYED RELEASE ORAL DAILY
Qty: 90 TABLET | Refills: 3 | Status: SHIPPED | OUTPATIENT
Start: 2022-12-01 | End: 2024-01-03

## 2022-12-01 RX ORDER — FLUTICASONE PROPIONATE AND SALMETEROL 250; 50 UG/1; UG/1
1 POWDER RESPIRATORY (INHALATION) 2 TIMES DAILY
Qty: 180 EACH | Refills: 0 | Status: SHIPPED | OUTPATIENT
Start: 2022-12-01 | End: 2023-06-08

## 2022-12-01 RX ORDER — LOSARTAN POTASSIUM 100 MG/1
100 TABLET ORAL DAILY
Qty: 90 TABLET | Refills: 0 | Status: SHIPPED | OUTPATIENT
Start: 2022-12-01 | End: 2023-04-25

## 2022-12-01 RX ORDER — ATORVASTATIN CALCIUM 10 MG/1
10 TABLET, FILM COATED ORAL DAILY
Qty: 90 TABLET | Refills: 3 | Status: SHIPPED | OUTPATIENT
Start: 2022-12-01 | End: 2024-01-03

## 2022-12-01 RX ORDER — METOPROLOL SUCCINATE 100 MG/1
100 TABLET, EXTENDED RELEASE ORAL DAILY
Qty: 90 TABLET | Refills: 3 | Status: SHIPPED | OUTPATIENT
Start: 2022-12-01 | End: 2024-01-03

## 2022-12-01 RX ORDER — METFORMIN HYDROCHLORIDE 500 MG/1
500 TABLET ORAL 2 TIMES DAILY
Qty: 180 TABLET | Refills: 1 | Status: SHIPPED | OUTPATIENT
Start: 2022-12-01 | End: 2023-03-08

## 2022-12-01 RX ORDER — SERTRALINE HYDROCHLORIDE 100 MG/1
100 TABLET, FILM COATED ORAL NIGHTLY
Qty: 90 TABLET | Refills: 0 | Status: SHIPPED | OUTPATIENT
Start: 2022-12-01 | End: 2023-02-23

## 2022-12-01 RX ORDER — CETIRIZINE HYDROCHLORIDE 10 MG/1
10 TABLET ORAL NIGHTLY
Status: CANCELLED | OUTPATIENT
Start: 2022-12-01

## 2022-12-01 RX ORDER — BUPROPION HYDROCHLORIDE 150 MG/1
150 TABLET ORAL DAILY
Qty: 90 TABLET | Refills: 0 | Status: SHIPPED | OUTPATIENT
Start: 2022-12-01 | End: 2023-04-25

## 2022-12-01 RX ORDER — AMLODIPINE BESYLATE 5 MG/1
5 TABLET ORAL DAILY
Qty: 90 TABLET | Refills: 0 | Status: SHIPPED | OUTPATIENT
Start: 2022-12-01 | End: 2023-02-23

## 2022-12-01 NOTE — PROGRESS NOTES
"[facility] VISIT      CHIEF COMPLAINT  Chief Complaint   Patient presents with    Follow-up    Diabetes    Hypertension       HPI  Hannah Montoya is a 67 y.o. female who presents for physical.     Social Factors  Tobacco use: No  Ready to Quit: No  Alcohol: No  Intimate partner violence screening  "Do you feel safe in your current relationship?" Yes   "Have you ever been in a relationship in which your partner frightened you or hurt you?" No  Living Will/POA: No  Regular Exercise: No    Depression  Over the past two weeks, have you felt down, depressed, or hopeless? No  Over the past two weeks, have you felt little interest or pleasure in doing things? No    Reproductive Health  Followed by OBGYN    CHD, HTN, DM2  CHD Risk Factors: diabetes mellitus, hypertension, obesity (BMI >= 30 kg/m2) and smoking/ tobacco exposure  Women 45 years and older should be screened for dyslipidemia if at increased risk of CHD  Women 20 to 45 years of age should be screened for dyslipidemia if at increased risk of CHD  Asymptomatic adults with sustained blood pressure greater than 135/80 mm Hg (treated or untreated) should be screened for type 2 diabetes mellitus    Estimated body mass index is 36.65 kg/m² as calculated from the following:    Height as of this encounter: 5' 2" (1.575 m).    Weight as of this encounter: 90.9 kg (200 lb 6.4 oz).      Screening  Mammogram needed: ordered  Colonoscopy needed: due in March 2020  Osteoporosis screen needed: utd     Women 50 to 74 years of age should be screened for breast cancer with mammography biennially.  Women should be screened for cervical cancer with Pap tests beginning at 21 years of age. Low-risk women should receive Pap testing every three years. Co-testing for human papillomavirus is an option beginning at 30 years of age, and can extend the screening interval to five years. Cervical cancer screening should be discontinued at 65 years of age or after total hysterectomy " "if the woman has a benign gynecologic history  Adults 50 to 75 years of age should be screened for colorectal cancer with an FOBT annually, sigmoidoscopy every five years with an FOBT every three years, or colonoscopy every 10 years.  Women 65 years and older should be screened for osteoporosis. Women younger than 65 years should be screened if the risk of fracture is greater than or equal to that of a 65-year-old white woman without additional risk factors.      Immunizations  delayed    ALLERGIES and MEDS were verified.   PMHx, PSHx, FHx, SOCIALHx were updated as pertinent.    REVIEW OF SYSTEMS  Review of Systems   Constitutional: Negative.    HENT: Negative.     Eyes:  Negative for discharge.   Respiratory:  Negative for wheezing.    Cardiovascular:  Negative for chest pain and palpitations.   Gastrointestinal:  Negative for blood in stool, constipation, diarrhea and vomiting.   Genitourinary:  Negative for hematuria.   Musculoskeletal: Negative.    Skin: Negative.    Neurological:  Negative for headaches.   Endo/Heme/Allergies:  Negative for polydipsia.       PHYSICAL EXAM  VITAL SIGNS: /80   Pulse 88   Temp 97.8 °F (36.6 °C) (Oral)   Resp 18   Ht 5' 2" (1.575 m)   Wt 90.9 kg (200 lb 6.4 oz)   LMP  (LMP Unknown)   SpO2 96%   BMI 36.65 kg/m²   GEN: Well developed, Well nourished, No acute distress.  HENT: Normocephalic, Atraumatic, Bilateral external ears normal, Nose normal, Oropharynx moist, No oral exudates.   Eyes: PERRL, EOMI, Conjunctiva normal, No discharge.   Neck: Supple, No tenderness.  Lymphatic: No cervical or supraclavicular lymphadenopathy noted.   Cardiovascular: Normal heart rate, Normal rhythm, No murmurs, No rubs, No gallops.   Thorax & Lungs: Normal breath sounds, No respiratory distress, No wheezing.  Abdomen: Soft, No tenderness, Bowel sounds normal.  Breast:deferred  Genital: deferreed   Skin: Warm, Dry, No erythema, No rash.   Extremities: No edema, No tenderness. "       ASSESSMENT/PLAN    Hannah was seen today for follow-up, diabetes and hypertension.    Diagnoses and all orders for this visit:    Class 2 severe obesity due to excess calories with serious comorbidity and body mass index (BMI) of 36.0 to 36.9 in adult  -     Lipid Panel; Future    Radiation-induced esophagitis  -     pantoprazole (PROTONIX) 40 MG tablet; Take 1 tablet (40 mg total) by mouth once daily.    Benign essential HTN  -     amLODIPine (NORVASC) 5 MG tablet; Take 1 tablet (5 mg total) by mouth once daily.  -     losartan (COZAAR) 100 MG tablet; Take 1 tablet (100 mg total) by mouth once daily.  -     metoprolol succinate (TOPROL-XL) 100 MG 24 hr tablet; Take 1 tablet (100 mg total) by mouth once daily.  -     Lipid Panel; Future    Type 2 diabetes mellitus without complication, without long-term current use of insulin  -     atorvastatin (LIPITOR) 10 MG tablet; Take 1 tablet (10 mg total) by mouth once daily.  -     metFORMIN (GLUCOPHAGE) 500 MG tablet; Take 1 tablet (500 mg total) by mouth 2 (two) times daily.  -     Comprehensive Metabolic Panel; Future  -     Lipid Panel; Future  -     Hemoglobin A1C; Future  -     Microalbumin/Creatinine Ratio, Urine; Future    Reactive depression  -     buPROPion (WELLBUTRIN XL) 150 MG TB24 tablet; Take 1 tablet (150 mg total) by mouth once daily.    COPD with acute exacerbation  -     fluticasone-salmeterol diskus inhaler 250-50 mcg; Inhale 1 puff into the lungs 2 (two) times daily. Controller    Grief reaction  -     sertraline (ZOLOFT) 100 MG tablet; Take 1 tablet (100 mg total) by mouth every evening.    Immunization due  -     (In Office Administered) Pneumococcal Polysaccharide Vaccine (23 Valent) (SQ/IM)    Other orders  The following orders have not been finalized:  -     Cancel: cetirizine (ZYRTEC) 10 MG tablet         FOLLOW UP: 6 months or sooner if needed  Follow up with cardiology as scheduled  Follow up with oncology as scheduled      Emanuel Chavez,  MD

## 2022-12-19 ENCOUNTER — HOSPITAL ENCOUNTER (OUTPATIENT)
Dept: RADIOLOGY | Facility: HOSPITAL | Age: 67
Discharge: HOME | End: 2022-12-19
Attending: NURSE PRACTITIONER
Payer: MEDICARE

## 2022-12-19 DIAGNOSIS — C34.91 NSCLC OF RIGHT LUNG: ICD-10-CM

## 2022-12-19 PROCEDURE — 25500020 PHARM REV CODE 255: Mod: HCNC | Performed by: NURSE PRACTITIONER

## 2022-12-19 PROCEDURE — 71260 CT THORAX DX C+: CPT | Mod: TC,HCNC

## 2022-12-19 PROCEDURE — 74177 CT CHEST ABDOMEN PELVIS WITH CONTRAST (XPD): ICD-10-PCS | Mod: 26,HCNC,, | Performed by: RADIOLOGY

## 2022-12-19 PROCEDURE — 71260 CT THORAX DX C+: CPT | Mod: 26,HCNC,, | Performed by: RADIOLOGY

## 2022-12-19 PROCEDURE — 74177 CT ABD & PELVIS W/CONTRAST: CPT | Mod: 26,HCNC,, | Performed by: RADIOLOGY

## 2022-12-19 PROCEDURE — 74177 CT ABD & PELVIS W/CONTRAST: CPT | Mod: TC,HCNC

## 2022-12-19 PROCEDURE — 71260 CT CHEST ABDOMEN PELVIS WITH CONTRAST (XPD): ICD-10-PCS | Mod: 26,HCNC,, | Performed by: RADIOLOGY

## 2022-12-19 RX ADMIN — IOHEXOL 100 ML: 350 INJECTION, SOLUTION INTRAVENOUS at 09:12

## 2022-12-22 ENCOUNTER — OFFICE VISIT (OUTPATIENT)
Dept: HEMATOLOGY/ONCOLOGY | Facility: CLINIC | Age: 67
End: 2022-12-22
Payer: MEDICARE

## 2022-12-22 DIAGNOSIS — I10 ESSENTIAL HYPERTENSION: ICD-10-CM

## 2022-12-22 DIAGNOSIS — I42.0 DILATED CARDIOMYOPATHY: ICD-10-CM

## 2022-12-22 DIAGNOSIS — Z95.810 CARDIAC RESYNCHRONIZATION THERAPY DEFIBRILLATOR (CRT-D) IN PLACE: ICD-10-CM

## 2022-12-22 DIAGNOSIS — C34.91 NSCLC OF RIGHT LUNG: Primary | ICD-10-CM

## 2022-12-22 DIAGNOSIS — I44.7 LEFT BUNDLE-BRANCH BLOCK: ICD-10-CM

## 2022-12-22 DIAGNOSIS — Z85.3 HISTORY OF BREAST CANCER IN FEMALE: ICD-10-CM

## 2022-12-22 DIAGNOSIS — J70.0 RADIATION PNEUMONITIS: ICD-10-CM

## 2022-12-22 PROCEDURE — 99214 PR OFFICE/OUTPT VISIT, EST, LEVL IV, 30-39 MIN: ICD-10-PCS | Mod: HCNC,95,, | Performed by: INTERNAL MEDICINE

## 2022-12-22 PROCEDURE — 99499 RISK ADDL DX/OHS AUDIT: ICD-10-PCS | Mod: HCNC,95,, | Performed by: INTERNAL MEDICINE

## 2022-12-22 PROCEDURE — 3044F HG A1C LEVEL LT 7.0%: CPT | Mod: HCNC,CPTII,95, | Performed by: INTERNAL MEDICINE

## 2022-12-22 PROCEDURE — 99499 UNLISTED E&M SERVICE: CPT | Mod: HCNC,95,, | Performed by: INTERNAL MEDICINE

## 2022-12-22 PROCEDURE — 4010F ACE/ARB THERAPY RXD/TAKEN: CPT | Mod: HCNC,CPTII,95, | Performed by: INTERNAL MEDICINE

## 2022-12-22 PROCEDURE — 99214 OFFICE O/P EST MOD 30 MIN: CPT | Mod: HCNC,95,, | Performed by: INTERNAL MEDICINE

## 2022-12-22 PROCEDURE — 1157F ADVNC CARE PLAN IN RCRD: CPT | Mod: HCNC,CPTII,95, | Performed by: INTERNAL MEDICINE

## 2022-12-22 PROCEDURE — 3044F PR MOST RECENT HEMOGLOBIN A1C LEVEL <7.0%: ICD-10-PCS | Mod: HCNC,CPTII,95, | Performed by: INTERNAL MEDICINE

## 2022-12-22 PROCEDURE — 4010F PR ACE/ARB THEARPY RXD/TAKEN: ICD-10-PCS | Mod: HCNC,CPTII,95, | Performed by: INTERNAL MEDICINE

## 2022-12-22 PROCEDURE — 1157F PR ADVANCE CARE PLAN OR EQUIV PRESENT IN MEDICAL RECORD: ICD-10-PCS | Mod: HCNC,CPTII,95, | Performed by: INTERNAL MEDICINE

## 2022-12-22 NOTE — PROGRESS NOTES
ONCOLOGY FOLLOW UP VISIT.     The patient location is: home  The chief complaint leading to consultation is: Imaging review s/p [id number] clinical trial for stage IIIA NSCLC    Visit type: audiovisual    Face to Face time with patient: 20  30 minutes of total time spent on the encounter, which includes face to face time and non-face to face time preparing to see the patient (eg, review of tests), Obtaining and/or reviewing separately obtained history, Documenting clinical information in the electronic or other health record, Independently interpreting results (not separately reported) and communicating results to the patient/family/caregiver, or Care coordination (not separately reported).     Each patient to whom he or she provides medical services by telemedicine is:  (1) informed of the relationship between the physician and patient and the respective role of any other health care provider with respect to management of the patient; and (2) notified that he or she may decline to receive medical services by telemedicine and may withdraw from such care at any time.      Cancer/Stage/TNM:    Cancer Staging   NSCLC of right lung  Staging form: Lung, AJCC 8th Edition  - Clinical stage from 9/29/2020: Stage IIIA (cT3, cN1, cM0) - Signed by Ramo Tucker MD on 10/24/2020         Oncology History   NSCLC of right lung   9/29/2020 Cancer Staged    Staging form: Lung, AJCC 8th Edition  - Clinical stage from 9/29/2020: Stage IIIA (cT3, cN1, cM0)       10/14/2020 Initial Diagnosis    NSCLC of right lung     11/17/2020 - 12/30/2020 Radiation Therapy    Treating physician: Harvinder Vásquez  Technique  Energy  Dose/Fx (Gy)  #Fx  Total Dose (Gy)    RLL Lung  VMAT  6X  2  30 / 30  60            Interval History:   Today, patient reports feeling well overall, no new symptoms.    ROS:  Review of Systems   Constitutional:  Negative for chills, fever and weight loss.   HENT:  Negative for hearing loss, nosebleeds and sore  throat.    Eyes:  Negative for blurred vision and double vision.   Respiratory:  Negative for cough, hemoptysis and shortness of breath (Resolved back to baseline).    Cardiovascular:  Negative for chest pain and leg swelling.   Gastrointestinal:  Negative for abdominal pain, blood in stool, diarrhea, heartburn, nausea and vomiting.   Genitourinary:  Negative for dysuria, frequency and hematuria.   Musculoskeletal:  Negative for back pain, joint pain and myalgias.   Skin:  Negative for itching and rash.   Neurological:  Negative for dizziness, tingling, sensory change, speech change and headaches.   Endo/Heme/Allergies:  Does not bruise/bleed easily.   Psychiatric/Behavioral:  The patient is not nervous/anxious.         A complete 12-point review of systems was reviewed and is negative except as mentioned above.     Past Medical History:   Past Medical History:   Diagnosis Date    AICD (automatic cardioverter/defibrillator) present     Asthma     bronchitis in past    Breast cancer 2016    right    Cardiac pacemaker     Cardiomyopathy     COPD (chronic obstructive pulmonary disease)     Diabetes mellitus, type 2     Hyperglycemia     Hyperlipidemia     Hypertension     Malignant neoplasm of overlapping sites of female breast 2/12/2016    Nuclear sclerosis of both eyes 8/12/2020    Respiratory distress 3/12/2020        Allergies:   Review of patient's allergies indicates:   Allergen Reactions    Taxol [paclitaxel]      Hypersensitivity reaction to taxol, symptoms included shortness of breath, nausea, dizziness, flushing     Adhesive Rash     tegaderm burns and blistered skin        Medications:   Current Outpatient Medications   Medication Sig Dispense Refill    ACCU-CHEK ALFRED PLUS TEST STRP Strp USE TO TEST THREE TIMES DAILY 200 strip 3    albuterol sulfate (PROAIR RESPICLICK) 90 mcg/actuation AePB Inhale 2 puffs into the lungs every 4 (four) hours as needed. Rescue 1 each 5    amLODIPine (NORVASC) 5 MG tablet Take  1 tablet (5 mg total) by mouth once daily. 90 tablet 0    atorvastatin (LIPITOR) 10 MG tablet Take 1 tablet (10 mg total) by mouth once daily. 90 tablet 3    buPROPion (WELLBUTRIN XL) 150 MG TB24 tablet Take 1 tablet (150 mg total) by mouth once daily. 90 tablet 0    cetirizine (ZYRTEC) 10 MG tablet Take 10 mg by mouth every evening.       fluticasone-salmeterol diskus inhaler 250-50 mcg Inhale 1 puff into the lungs 2 (two) times daily. Controller 180 each 0    losartan (COZAAR) 100 MG tablet Take 1 tablet (100 mg total) by mouth once daily. 90 tablet 0    metFORMIN (GLUCOPHAGE) 500 MG tablet Take 1 tablet (500 mg total) by mouth 2 (two) times daily. 180 tablet 1    metoprolol succinate (TOPROL-XL) 100 MG 24 hr tablet Take 1 tablet (100 mg total) by mouth once daily. 90 tablet 3    multivitamin (THERAGRAN) per tablet Take 1 tablet by mouth once daily.      pantoprazole (PROTONIX) 40 MG tablet Take 1 tablet (40 mg total) by mouth once daily. 90 tablet 3    sertraline (ZOLOFT) 100 MG tablet Take 1 tablet (100 mg total) by mouth every evening. 90 tablet 0    tiotropium (SPIRIVA WITH HANDIHALER) 18 mcg inhalation capsule INHALE THE CONTENTS OF 1 CAPSULE BY MOUTH EVERY DAY 90 capsule 0     No current facility-administered medications for this visit.     Facility-Administered Medications Ordered in Other Visits   Medication Dose Route Frequency Provider Last Rate Last Admin    fentaNYL injection 25 mcg  25 mcg Intravenous Q5 Min PRN Keesha Martins MD        haloperidol lactate injection 0.5 mg  0.5 mg Intravenous Once PRN Keesha Martins MD        HYDROmorphone injection 0.2 mg  0.2 mg Intravenous Q5 Min PRN Keesha Martins MD        ondansetron injection 4 mg  4 mg Intravenous Once PRN Keesha Martins MD        sodium chloride 0.9% flush 10 mL  10 mL Intravenous PRN Keesha Martins MD            Physical Exam:   LMP  (LMP Unknown)      ECOG Performance Status: (foot note - ECOG PS provided by Eastern Cooperative Oncology Group) 0 -  Asymptomatic    Physical Exam            Labs:   No results found for this or any previous visit (from the past 48 hour(s)).       Imaging: Personally reviewed CT scans with patient showing new, progressing LLL lung nodule now 8 mm  DXA Bone Density Spine And Hip (AMGEN)  Narrative: EXAMINATION:  DEXA BONE DENSITY SPINE HIP    CLINICAL HISTORY:  Asymptomatic menopausal state.68 y/o female with no history of fractures.  She had menopausal symptoms at 49 y/o.  She has a history of Asthma, Diabetes, Radiation Treatment and Lung/Breast Ca.  She does not exercise or smoke.    TECHNIQUE:  DXA specification: Main [place] 9GAG Horizon A ([id number])    Bone Mineral Density scanning was performed over the hip and lumbar spine.    Review of the images confirms satisfactory positioning and technique.    COMPARISON:  No Comparisons    FINDINGS:  Lumbar spine (L1-L4):              BMD is 1.080 g/cm2, T-score is 0.3, and Z-score is 2.1.    Total hip:                                BMD is 0.907 g/cm2, T-score is -0.3, and Z-score is 0.9.    Femoral neck:                          BMD is 0.784 g/cm2, T-score is -0.6, and Z-score is 0.9.    Distal 1/3 radius:                      Not applicable    FRAX:    6.8% risk of a major osteoporotic fracture in the next 10 years.    0.3% risk of hip fracture in the next 10 years.  Impression: *Normal bone mineral density    RECOMMENDATIONS:  *Daily calcium intake 0581-7403 mg, dietary sources preferred; Vitamin D 7030-6922 IU daily.  *Weight bearing exercise and fall precautions.  *No additional pharmacologic therapy recommended at this time.  *There is no need to repeat BMD in the near future unless additional risk factors become apparent.    EXPLANATION OF RESULTS:  T-score compares these results to the average bone density of a 20-29 year-old of the same gender.    Z-score compares this result to the average bone density to people of the same age, gender, and race.    The amounts indicate  the number of standard deviations above or below the mean.    * Osteoporosis is generally defined as having a T-score between less than or equal to -2.5.    * Low bone mass (osteopenia) is generally defined as having a T-score between -1.0 and -2.5.    * The normal range is generally defined as having a T-score greater than or equal to -1.0.    * Calculated FRAX scores for fracture risk prediction may not be accurate in the setting of certain clinical factors such as pharmacologic therapy for osteoporosis, prior fragility fractures, high dose glucocorticoid use.    Electronically signed by: Jamilah Olivarez MD  Date:    12/19/2022  Time:    11:00  CT Chest Abdomen Pelvis With Contrast  Narrative: EXAMINATION:  CT CHEST ABDOMEN PELVIS WITH CONTRAST (XPD)    CLINICAL HISTORY:  Non-small cell lung cancer (NSCLC), metastatic, assess treatment response; Malignant neoplasm of unspecified part of right bronchus or lung    TECHNIQUE:  Low dose axial images, sagittal and coronal reformations were obtained from the thoracic inlet to the pubic symphysis following the IV administration of 100 mL of Omnipaque 350 .  No oral contrast was administered.    COMPARISON:  09/27/2022.    FINDINGS:  Structures at the base of the neck appear grossly unremarkable.  There is an implanted cardiac device present with transvenous and epicardial leads present as before.  The heart does not appear enlarged.  There is no evidence for pericardial effusion.  There is a small amount of atherosclerotic calcification within the thoracic aorta which is normal in caliber without aneurysmal dilatation.  No hilar or mediastinal adenopathy is identified.  No axillary adenopathy is identified.  Once again, right parahilar and right lower lobe atelectasis/consolidation is identified in this patient with non-small cell lung cancer.  The size and appearance of this right parahilar and lower lobe atelectasis/consolidation does not appear significantly changed.  A 8  mm left basilar nodule is again identified (image 358, series 6), stable.  The nodule within the left lower lobe posteromedially has    increased in size when compared to the prior exam now measuring approximately 8 mm (image 408, series 6) compared to 6 mm on the prior study.  Once again, metastatic disease should be strongly considered.  There is no evidence for pneumothorax or pleural effusions.  Bony structures appear intact without evidence for acute fracture or bone destruction.  There are degenerative changes within the lower lumbar spine.    The liver is mildly enlarged and is diffusely decreased in density consistent with fatty changes of the liver.  No focal liver lesions are identified.  The gallbladder is surgically absent.  There is no evidence for intrahepatic or extrahepatic biliary dilatation.  The portal venous system is patent.  The spleen, stomach, and pancreas all appear grossly unremarkable.  Once again, subtle nodularity of the left adrenal gland is identified and this is unchanged.  Right adrenal gland is normal in size.  There is a nodular contour of the kidneys which may be related to renal parenchymal scarring.  There is no evidence for abnormal renal masses or hydronephrosis.  The kidneys are noted to concentrate contrast symmetrically.  Atherosclerotic calcification is present within the abdominal aorta which tapers normally without aneurysmal dilatation.  No para-aortic lymphadenopathy is identified.  The appendix is present and appears unremarkable.  No dilated loops of bowel are evident.  The uterus is present and demonstrates a lobulated contour likely related to uterine fibroids.  No abnormal adnexal masses are identified.  The urinary bladder is decompressed but appears grossly unremarkable.  There is no evidence for pelvic or inguinal lymphadenopathy.  No ascites is identified.  There is a tiny fat containing umbilical hernia.  Impression: Stable appearance to the right  parahilar/right lower lobe atelectasis/consolidation when compared to the prior examination in this patient with history of non-small cell lung cancer.    Enlarging pulmonary nodule within the left lower lobe posteromedially. Metastatic disease cannot be excluded. Stable 8 mm left lower lobe pulmonary nodule.    Mild hepatomegaly with fatty changes of the liver.    S/p cholecystectomy.    Persistent subtle nodularity of the left adrenal gland, stable.    Uterine fibroids.    Lobulated contour of the kidneys likely related to renal parenchymal scarring.    Tiny fat containing umbilical hernia.    Electronically signed by: Jose Lin MD  Date:    12/19/2022  Time:    10:12              Diagnoses:       1. NSCLC of right lung    2. Radiation pneumonitis    3. History of breast cancer in female    4. Dilated cardiomyopathy    5. Left bundle-branch block    6. Cardiac resynchronization therapy defibrillator (CRT-D) in place    7. Essential hypertension            Assessment and Plan:         1. Stage IIIA NSCLC  Plan is for definitive chemoRT, will need re-staging scans prior.  Reviewed with patient in detail her diagnosis, stage, treatment options, and prognosis (3 year OS 66% vs. 43.5% without durvalumab) with standard of care chemoRT followed by maintenance immunotherapy.  Patient has consented for [id number] clinical trial, a randomized control trial evaluating combination chemoRT + durvalumab followed by maintenance vs. SOC chemoRT -> maintenance.  CT scans 7/2021 with CR.  - patient randomized on [id number] to SOC arm (Imfinzi) - completed 12/2021.    - CT scan 12/2022 with progressing pulmonary nodule in LLL, still with stable disease in RLL consolidation. .  Will obtain PETCT to better evaluate this indeterminate lung nodule and review at our [facility]    2. Grade 2 initially, now resolved. Continue COPD maintenance inhalers and she has duonebs prn.     3. 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       4-7.  Stable, follows with cardiology.  AICD placed, but patient now has recovered EF and only takes lasix prn.  NYHA class I.    Javi Avina M.D.  Hematology/Oncology Attending  [facility]  [facility]

## 2022-12-22 NOTE — Clinical Note
Could we add this [place] [facility] [place]  She has stage IIIA R lung SCC s/p chemoradiation and maintenance Imfinzi.  She now has an enlarging LLL lung nodule 8 mm.  I am getting a PETCT 1/3 and we can review on 1/4.  Thanks

## 2023-01-03 ENCOUNTER — PATIENT MESSAGE (OUTPATIENT)
Dept: HEMATOLOGY/ONCOLOGY | Facility: CLINIC | Age: 68
End: 2023-01-03
Payer: MEDICARE

## 2023-01-11 DIAGNOSIS — J41.0 SIMPLE CHRONIC BRONCHITIS: ICD-10-CM

## 2023-01-11 RX ORDER — TIOTROPIUM BROMIDE 18 UG/1
CAPSULE ORAL; RESPIRATORY (INHALATION)
Qty: 90 CAPSULE | Refills: 3 | Status: SHIPPED | OUTPATIENT
Start: 2023-01-11 | End: 2023-01-26

## 2023-01-11 NOTE — TELEPHONE ENCOUNTER
Refill Decision Note   Hannah Montoya  is requesting a refill authorization.  Brief Assessment and Rationale for Refill:  Approve     Medication Therapy Plan:  fovs; flos    Medication Reconciliation Completed: No   Comments:     No Care Gaps recommended.     Note composed:2:53 AM 01/11/2023

## 2023-01-11 NOTE — TELEPHONE ENCOUNTER
Care Due:                  Date            Visit Type   Department     Provider  --------------------------------------------------------------------------------                                EP -         [facility]                              PRIMARY      MED/ INTERNAL  Last Visit: 12-      CARE ([facility])   MED/ PEDS      Emanuel A  Page                              EP -         [facility]                              PRIMARY      MED/ INTERNAL  Next Visit: 06-      CARE ([facility])   MED/ PEDS      Emanuel A  Page                                                            Last  Test          Frequency    Reason                     Performed    Due Date  --------------------------------------------------------------------------------    HBA1C.......  6 months...  metFORMIN................  06- 12-    [employer] Embedded Care Gaps. Reference number: [id number]. 1/11/2023   12:38:52 AM CST

## 2023-01-12 ENCOUNTER — HOSPITAL ENCOUNTER (OUTPATIENT)
Dept: RADIOLOGY | Facility: HOSPITAL | Age: 68
Discharge: HOME | End: 2023-01-12
Attending: INTERNAL MEDICINE
Payer: MEDICARE

## 2023-01-12 DIAGNOSIS — C34.91 NSCLC OF RIGHT LUNG: ICD-10-CM

## 2023-01-12 PROCEDURE — A9552 F18 FDG: HCPCS | Mod: HCNC

## 2023-01-12 PROCEDURE — 78815 NM PET CT ROUTINE: ICD-10-PCS | Mod: 26,HCNC,PS, | Performed by: RADIOLOGY

## 2023-01-12 PROCEDURE — A9698 NON-RAD CONTRAST MATERIALNOC: HCPCS | Mod: HCNC | Performed by: INTERNAL MEDICINE

## 2023-01-12 PROCEDURE — 25500020 PHARM REV CODE 255: Mod: HCNC | Performed by: INTERNAL MEDICINE

## 2023-01-12 PROCEDURE — 78815 PET IMAGE W/CT SKULL-THIGH: CPT | Mod: 26,HCNC,PS, | Performed by: RADIOLOGY

## 2023-01-12 PROCEDURE — 78815 PET IMAGE W/CT SKULL-THIGH: CPT | Mod: TC,HCNC,PS

## 2023-01-12 RX ADMIN — BARIUM SULFATE 900 ML: 20 SUSPENSION ORAL at 08:01

## 2023-01-13 ENCOUNTER — LAB VISIT (OUTPATIENT)
Dept: LAB | Facility: HOSPITAL | Age: 68
End: 2023-01-13
Attending: FAMILY MEDICINE
Payer: MEDICARE

## 2023-01-13 ENCOUNTER — OFFICE VISIT (OUTPATIENT)
Dept: HEMATOLOGY/ONCOLOGY | Facility: CLINIC | Age: 68
End: 2023-01-13
Payer: MEDICARE

## 2023-01-13 VITALS
SYSTOLIC BLOOD PRESSURE: 146 MMHG | WEIGHT: 200.19 LBS | RESPIRATION RATE: 18 BRPM | HEART RATE: 108 BPM | TEMPERATURE: 99 F | BODY MASS INDEX: 36.84 KG/M2 | OXYGEN SATURATION: 95 % | HEIGHT: 62 IN | DIASTOLIC BLOOD PRESSURE: 76 MMHG

## 2023-01-13 DIAGNOSIS — I42.0 DILATED CARDIOMYOPATHY: ICD-10-CM

## 2023-01-13 DIAGNOSIS — I44.7 LEFT BUNDLE-BRANCH BLOCK: ICD-10-CM

## 2023-01-13 DIAGNOSIS — C34.91 NSCLC OF RIGHT LUNG: Primary | ICD-10-CM

## 2023-01-13 DIAGNOSIS — Z85.3 HISTORY OF BREAST CANCER IN FEMALE: ICD-10-CM

## 2023-01-13 DIAGNOSIS — Z95.810 CARDIAC RESYNCHRONIZATION THERAPY DEFIBRILLATOR (CRT-D) IN PLACE: ICD-10-CM

## 2023-01-13 DIAGNOSIS — I10 ESSENTIAL HYPERTENSION: ICD-10-CM

## 2023-01-13 DIAGNOSIS — J70.0 RADIATION PNEUMONITIS: ICD-10-CM

## 2023-01-13 DIAGNOSIS — C34.91 NSCLC OF RIGHT LUNG: ICD-10-CM

## 2023-01-13 LAB — POCT GLUCOSE: 127 MG/DL (ref 70–110)

## 2023-01-13 PROCEDURE — 1157F ADVNC CARE PLAN IN RCRD: CPT | Mod: HCNC,CPTII,S$GLB, | Performed by: INTERNAL MEDICINE

## 2023-01-13 PROCEDURE — 99999 PR PBB SHADOW E&M-EST. PATIENT-LVL V: ICD-10-PCS | Mod: PBBFAC,HCNC,, | Performed by: INTERNAL MEDICINE

## 2023-01-13 PROCEDURE — 3008F PR BODY MASS INDEX (BMI) DOCUMENTED: ICD-10-PCS | Mod: HCNC,CPTII,S$GLB, | Performed by: INTERNAL MEDICINE

## 2023-01-13 PROCEDURE — 99215 OFFICE O/P EST HI 40 MIN: CPT | Mod: HCNC,S$GLB,, | Performed by: INTERNAL MEDICINE

## 2023-01-13 PROCEDURE — 3078F DIAST BP <80 MM HG: CPT | Mod: HCNC,CPTII,S$GLB, | Performed by: INTERNAL MEDICINE

## 2023-01-13 PROCEDURE — 1159F PR MEDICATION LIST DOCUMENTED IN MEDICAL RECORD: ICD-10-PCS | Mod: HCNC,CPTII,S$GLB, | Performed by: INTERNAL MEDICINE

## 2023-01-13 PROCEDURE — 99215 PR OFFICE/OUTPT VISIT, EST, LEVL V, 40-54 MIN: ICD-10-PCS | Mod: HCNC,S$GLB,, | Performed by: INTERNAL MEDICINE

## 2023-01-13 PROCEDURE — 1101F PT FALLS ASSESS-DOCD LE1/YR: CPT | Mod: HCNC,CPTII,S$GLB, | Performed by: INTERNAL MEDICINE

## 2023-01-13 PROCEDURE — 1101F PR PT FALLS ASSESS DOC 0-1 FALLS W/OUT INJ PAST YR: ICD-10-PCS | Mod: HCNC,CPTII,S$GLB, | Performed by: INTERNAL MEDICINE

## 2023-01-13 PROCEDURE — 1157F PR ADVANCE CARE PLAN OR EQUIV PRESENT IN MEDICAL RECORD: ICD-10-PCS | Mod: HCNC,CPTII,S$GLB, | Performed by: INTERNAL MEDICINE

## 2023-01-13 PROCEDURE — 1159F MED LIST DOCD IN RCRD: CPT | Mod: HCNC,CPTII,S$GLB, | Performed by: INTERNAL MEDICINE

## 2023-01-13 PROCEDURE — 3288F PR FALLS RISK ASSESSMENT DOCUMENTED: ICD-10-PCS | Mod: HCNC,CPTII,S$GLB, | Performed by: INTERNAL MEDICINE

## 2023-01-13 PROCEDURE — 3077F PR MOST RECENT SYSTOLIC BLOOD PRESSURE >= 140 MM HG: ICD-10-PCS | Mod: HCNC,CPTII,S$GLB, | Performed by: INTERNAL MEDICINE

## 2023-01-13 PROCEDURE — 36415 COLL VENOUS BLD VENIPUNCTURE: CPT | Mod: HCNC | Performed by: NURSE PRACTITIONER

## 2023-01-13 PROCEDURE — 99999 PR PBB SHADOW E&M-EST. PATIENT-LVL V: CPT | Mod: PBBFAC,HCNC,, | Performed by: INTERNAL MEDICINE

## 2023-01-13 PROCEDURE — 1126F AMNT PAIN NOTED NONE PRSNT: CPT | Mod: HCNC,CPTII,S$GLB, | Performed by: INTERNAL MEDICINE

## 2023-01-13 PROCEDURE — 3078F PR MOST RECENT DIASTOLIC BLOOD PRESSURE < 80 MM HG: ICD-10-PCS | Mod: HCNC,CPTII,S$GLB, | Performed by: INTERNAL MEDICINE

## 2023-01-13 PROCEDURE — 1126F PR PAIN SEVERITY QUANTIFIED, NO PAIN PRESENT: ICD-10-PCS | Mod: HCNC,CPTII,S$GLB, | Performed by: INTERNAL MEDICINE

## 2023-01-13 PROCEDURE — 3077F SYST BP >= 140 MM HG: CPT | Mod: HCNC,CPTII,S$GLB, | Performed by: INTERNAL MEDICINE

## 2023-01-13 PROCEDURE — 3008F BODY MASS INDEX DOCD: CPT | Mod: HCNC,CPTII,S$GLB, | Performed by: INTERNAL MEDICINE

## 2023-01-13 PROCEDURE — 3288F FALL RISK ASSESSMENT DOCD: CPT | Mod: HCNC,CPTII,S$GLB, | Performed by: INTERNAL MEDICINE

## 2023-01-13 NOTE — Clinical Note
Yehuda,  This is a patient we are trying to get in for biopsy of a peripheral R lung mass biopsy ASAP.  Any availability soon at [facility]?  Thanks Lucine

## 2023-01-13 NOTE — PROGRESS NOTES
ONCOLOGY FOLLOW UP VISIT.       Cancer/Stage/TNM:    Cancer Staging   NSCLC of right lung  Staging form: Lung, AJCC 8th Edition  - Clinical stage from 9/29/2020: Stage IIIA (cT3, cN1, cM0) - Signed by Ramo Tucker MD on 10/24/2020         Oncology History   NSCLC of right lung   9/29/2020 Cancer Staged    Staging form: Lung, AJCC 8th Edition  - Clinical stage from 9/29/2020: Stage IIIA (cT3, cN1, cM0)       10/14/2020 Initial Diagnosis    NSCLC of right lung     11/17/2020 - 12/30/2020 Radiation Therapy    Treating physician: Harvinder Lara     Site  Technique  Energy  Dose/Fx (Gy)  #Fx  Total Dose (Gy)    RLL Lung  VMAT  6X  2  30 / 30  60            Interval History:   Today, patient reports feeling well overall, no new symptoms. Denies any SOB or difficulty with breathing.     ROS:  Review of Systems   Constitutional:  Negative for chills, fever and weight loss.   HENT:  Negative for hearing loss, nosebleeds and sore throat.    Eyes:  Negative for blurred vision and double vision.   Respiratory:  Negative for cough, hemoptysis and shortness of breath (Resolved back to baseline).    Cardiovascular:  Negative for chest pain and leg swelling.   Gastrointestinal:  Negative for abdominal pain, blood in stool, diarrhea, heartburn, nausea and vomiting.   Genitourinary:  Negative for dysuria, frequency and hematuria.   Musculoskeletal:  Negative for back pain, joint pain and myalgias.   Skin:  Negative for itching and rash.   Neurological:  Negative for dizziness, tingling, sensory change, speech change and headaches.   Endo/Heme/Allergies:  Does not bruise/bleed easily.   Psychiatric/Behavioral:  The patient is not nervous/anxious.         A complete 12-point review of systems was reviewed and is negative except as mentioned above.     Past Medical History:   Past Medical History:   Diagnosis Date    AICD (automatic cardioverter/defibrillator) present     Asthma     bronchitis in past    Breast cancer 2016     right    Cardiac pacemaker     Cardiomyopathy     COPD (chronic obstructive pulmonary disease)     Diabetes mellitus, type 2     Hyperglycemia     Hyperlipidemia     Hypertension     Malignant neoplasm of overlapping sites of female breast 2/12/2016    Nuclear sclerosis of both eyes 8/12/2020    Respiratory distress 3/12/2020        Allergies:   Review of patient's allergies indicates:   Allergen Reactions    Taxol [paclitaxel]      Hypersensitivity reaction to taxol, symptoms included shortness of breath, nausea, dizziness, flushing     Adhesive Rash     tegaderm burns and blistered skin        Medications:   Current Outpatient Medications   Medication Sig Dispense Refill    ACCU-CHEK ALFRED PLUS TEST STRP Strp USE TO TEST THREE TIMES DAILY 200 strip 3    albuterol sulfate (PROAIR RESPICLICK) 90 mcg/actuation AePB Inhale 2 puffs into the lungs every 4 (four) hours as needed. Rescue 1 each 5    amLODIPine (NORVASC) 5 MG tablet Take 1 tablet (5 mg total) by mouth once daily. 90 tablet 0    atorvastatin (LIPITOR) 10 MG tablet Take 1 tablet (10 mg total) by mouth once daily. 90 tablet 3    buPROPion (WELLBUTRIN XL) 150 MG TB24 tablet Take 1 tablet (150 mg total) by mouth once daily. 90 tablet 0    cetirizine (ZYRTEC) 10 MG tablet Take 10 mg by mouth every evening.       fluticasone-salmeterol diskus inhaler 250-50 mcg Inhale 1 puff into the lungs 2 (two) times daily. Controller 180 each 0    losartan (COZAAR) 100 MG tablet Take 1 tablet (100 mg total) by mouth once daily. 90 tablet 0    metFORMIN (GLUCOPHAGE) 500 MG tablet Take 1 tablet (500 mg total) by mouth 2 (two) times daily. 180 tablet 1    metoprolol succinate (TOPROL-XL) 100 MG 24 hr tablet Take 1 tablet (100 mg total) by mouth once daily. 90 tablet 3    multivitamin (THERAGRAN) per tablet Take 1 tablet by mouth once daily.      pantoprazole (PROTONIX) 40 MG tablet Take 1 tablet (40 mg total) by mouth once daily. 90 tablet 3    sertraline (ZOLOFT) 100 MG tablet  Take 1 tablet (100 mg total) by mouth every evening. 90 tablet 0    tiotropium (SPIRIVA WITH HANDIHALER) 18 mcg inhalation capsule INHALE 1 CAPSULE BY MOUTH EVERY DAY 90 capsule 3     No current facility-administered medications for this visit.     Facility-Administered Medications Ordered in Other Visits   Medication Dose Route Frequency Provider Last Rate Last Admin    fentaNYL injection 25 mcg  25 mcg Intravenous Q5 Min PRCOURTNEY Martins MD        haloperidol lactate injection 0.5 mg  0.5 mg Intravenous Once PRN Keesha Martins MD        HYDROmorphone injection 0.2 mg  0.2 mg Intravenous Q5 Min PRN Keesha Martins MD        ondansetron injection 4 mg  4 mg Intravenous Once PRN Keesha Martins MD        sodium chloride 0.9% flush 10 mL  10 mL Intravenous PRN Keesha Martins MD            Physical Exam:   LMP  (LMP Unknown)      ECOG Performance Status: (foot note - ECOG PS provided by Eastern Cooperative Oncology Group) 0 - Asymptomatic    Physical Exam            Labs:   Recent Results (from the past 48 hour(s))   POCT glucose    Collection Time: 01/12/23  8:50 AM   Result Value Ref Range    POCT Glucose 127 (H) 70 - 110 mg/dL          Imaging: Personally reviewed CT scans with patient showing new, progressing LLL lung nodule now 8 mm  NM PET CT Routine Skull to Mid Thigh  Narrative: EXAMINATION:  NM PET CT ROUTINE    CLINICAL HISTORY:  Non-small cell lung cancer (NSCLC), metastatic, assess treatment response;Indeterminate lung nodule on re-staging CT scans; Malignant neoplasm of unspecified part of right bronchus or lung    TECHNIQUE:  11.42 mCi of F18-FDG was administered intravenously in the left antecubital fossa.  After an approximately 60 min distribution time, PET/CT images were acquired from the skull base to mid thigh.  Transmission images were acquired to correct for attenuation using a whole body low-dose CT scan with oral contrast and without IV contrast with the arms positioned above the head. Glycemia at the time of  injection was 127 mg/dL.    COMPARISON:  CT chest abdomen pelvis 12/19/2022    FDG PET-CT 09/14/2021    FINDINGS:  Quality of the study: Adequate.    In the head and neck, there are no hypermetabolic lesions worrisome for malignancy. There are no hypermetabolic mucosal lesions, and there are no pathologically enlarged or hypermetabolic lymph nodes.    In the chest, there is an enlarging right lower lobe mass measuring 4.4 x 6.8 cm in axial series 2, image 73) with SUV max 19, previously 2.0 x 3.7 cm with SUV max 11.  There is surrounding fibrotic change and bronchiectasis likely related to post radiation change.  Stable opacity in the right upper measuring 1.0 cm (axial series 2, image 59) without significant uptake    Stable opacity in the left lower lobe measuring 1.0 cm (axial series 2, image 91) without significant hypermetabolic activity.  Additional 0.8 cm opacity along the posteromedial left lower lobe (axial series 2, image 95) appears smaller as compared to CT 12/19/2022 without significant hypermetabolic activity.  Resolution of dependent left lower lobe opacity with mild uptake seen PET-CT 09/14/2021.    No definitive hypermetabolic hilar or mediastinal lymphadenopathy in this patient status post reported right sided hilar lymphadenectomy.    In the abdomen and pelvis, there is physiologic tracer distribution within the abdominal organs and excretion into the genitourinary system.    In the bones, there are no hypermetabolic lesions worrisome for malignancy.    In the extremities, there are no hypermetabolic lesions worrisome for malignancy.    Additional findings: Left chest wall cardiac conduction device with transvenous and epicardial leads in stable position.  Cholecystectomy.  Impression: Enlarging right lower lobe mass with increased hypermetabolic activity concerning for recurrence.  No definitive mediastinal or hilar lymphadenopathy.    I, Javi Martins MD, attest that I reviewed and interpreted the  images.    Electronically signed by resident: Brayan Coffman  Date:    01/12/2023  Time:    10:53    Electronically signed by: Javi Martins  Date:    01/13/2023  Time:    10:47              Diagnoses:       1. NSCLC of right lung    2. Radiation pneumonitis    3. History of breast cancer in female    4. Dilated cardiomyopathy    5. Left bundle-branch block    6. Cardiac resynchronization therapy defibrillator (CRT-D) in place    7. Essential hypertension            Assessment and Plan:         1. Stage IIIA NSCLC  Plan is for definitive chemoRT, will need re-staging scans prior.  Reviewed with patient in detail her diagnosis, stage, treatment options, and prognosis (3 year OS 66% vs. 43.5% without durvalumab) with standard of care chemoRT followed by maintenance immunotherapy.  Patient has consented for [id number] clinical trial, a randomized control trial evaluating combination chemoRT + durvalumab followed by maintenance vs. SOC chemoRT -> maintenance.  CT scans 7/2021 with CR.  - patient randomized on [id number] to SOC arm (Imfinzi) - completed 12/2021.    - CT scan 12/2022 with progressing pulmonary nodule in LLL, still with stable disease in RLL consolidation. PET scan also showing enlarging right lower lobe mass with increased hypermetabolic activity concerning for recurrence. Will present her case at the next thoracic tumor board to discuss biopsy and possible treatment options.   - Referred to IR for biopsy. Start authorization for 9LA. Liquid Tempus today. Repeat Brain MRI in setting of recurrence.     2. Grade 2 initially, now resolved. Continue COPD maintenance inhalers and she has duonebs prn.     3. 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      4-7.  Stable, follows with cardiology. AICD placed, but patient now has recovered EF and only takes lasix prn.  NYHA class I.    Patient was also seen and examined by Dr. Avina. Patient is in agreement with the proposed treatment plan. All questions  were answered to the patient's satisfaction. Pt knows to call clinic if anything is needed before the next clinic visit.    Aide Magaña, MSN, APRN, FNP-C  Hematology and Medical Oncology  Nurse Practitioner to Dr. Javi Avina  Nurse Practitioner, [facility]      I have reviewed the notes, assessments, and/or procedures performed by Aide MONTELONGO, as above.  I have personally interviewed and examined the patient at the beside, and rounded with Aide. I concur with her assessment and plan and the documentation of Hannah Montoya.    MDM includes:    - Acute or chronic illness or injury that poses a threat to life or bodily function  - Independent review and explanation of 2 results from unique tests  - Discussion of management and ordering 2 unique tests  - Extensive discussion of treatment and management  - Drug therapy requiring intensive monitoring for toxicity    Javi Avina M.D.  Hematology/Oncology Attending  [facility]  [facility]             Route Chart for Scheduling    Med Onc Chart Routing  Urgent    Follow up with physician 2 weeks. after biopsy - discuss treatment   Follow up with DANIEL    Infusion scheduling note Start auth for Opdivo/Yervoy/Carbo/Taxol   Injection scheduling note    Labs CBC, CMP, TSH and free T4   Lab interval:  prior to visit   Imaging MRI   MRI brain next available.   Pharmacy appointment    Other referrals

## 2023-01-13 NOTE — PLAN OF CARE
START OFF PATHWAY REGIMEN - Non-Small Cell Lung            Nivolumab (Opdivo)       Ipilimumab (Yervoy)       Carboplatin       Paclitaxel       Nivolumab (Opdivo)       Ipilimumab (Yervoy)           Additional Orders: Serious immune-mediated adverse events can occur with   ipilimumab and nivolumab. Please monitor your patient and refer to the linked   immune-mediated adverse reaction management materials for more information.    **Always confirm dose/schedule in your pharmacy ordering system**    Patient Characteristics:  Local Recurrence  Therapeutic Status: Local Recurrence  Intent of Therapy:  Non-Curative / Palliative Intent, Discussed with Patient

## 2023-01-17 ENCOUNTER — TELEPHONE (OUTPATIENT)
Dept: HEMATOLOGY/ONCOLOGY | Facility: CLINIC | Age: 68
End: 2023-01-17
Payer: MEDICARE

## 2023-01-17 NOTE — TELEPHONE ENCOUNTER
I spoke to patient. She told me that someone had called her regarding a virtual visit to discuss scheduling a biopsy. They told her it was scheduled for today at 3:30 but there is nothing on the schedule. I told her I would send a message to them to see if they knew anything about it.

## 2023-01-17 NOTE — TELEPHONE ENCOUNTER
"----- Message from Gail Cole sent at 1/17/2023  9:11 AM CST -----  Consult/Advisory    Name Of Caller: self    Contact Preference?: [phone]    What is the nature of the call?: requesting a call back in regards to virtual appt that was supposed to be scheduled today in regards to discussing scheduling a biopsy. Appt not in EPIC           Additional Notes:  "Thank you for all that you do for our patients'"     "

## 2023-01-18 ENCOUNTER — TELEPHONE (OUTPATIENT)
Dept: HEMATOLOGY/ONCOLOGY | Facility: CLINIC | Age: 68
End: 2023-01-18
Payer: MEDICARE

## 2023-01-18 NOTE — TELEPHONE ENCOUNTER
I spoke to patient and daughter, Elizabeth. They had some questions regarding plan of care and treatments. We reviewed the treatment plan and went over the scan, labs, physician/NP appointments. They were satisfied with our discussion and thanked me for answering their questions. I told them to call back any time.

## 2023-01-18 NOTE — TELEPHONE ENCOUNTER
----- Message from Joshua Morales sent at 1/18/2023  3:09 PM CST -----  Regarding: Questions  Daughter would like to speak with nurse in regards treatment plan Dr. Avina discuss, also would she need creatine lab done prior to MRI and should she need to fast.    Contact  [phone]

## 2023-01-19 DIAGNOSIS — C34.91 NSCLC OF RIGHT LUNG: Primary | ICD-10-CM

## 2023-01-19 DIAGNOSIS — Z00.6 EXAMINATION OF PARTICIPANT IN CLINICAL TRIAL: ICD-10-CM

## 2023-01-20 ENCOUNTER — PATIENT OUTREACH (OUTPATIENT)
Dept: ADMINISTRATIVE | Facility: HOSPITAL | Age: 68
End: 2023-01-20
Payer: MEDICARE

## 2023-01-20 ENCOUNTER — TELEPHONE (OUTPATIENT)
Dept: HEMATOLOGY/ONCOLOGY | Facility: CLINIC | Age: 68
End: 2023-01-20
Payer: MEDICARE

## 2023-01-20 ENCOUNTER — DOCUMENTATION ONLY (OUTPATIENT)
Dept: PHARMACY | Facility: HOSPITAL | Age: 68
End: 2023-01-20
Payer: MEDICARE

## 2023-01-20 RX ORDER — IOHEXOL 350 MG/ML
INJECTION, SOLUTION INTRAVENOUS
COMMUNITY
Start: 2022-09-27 | End: 2023-02-06

## 2023-01-20 NOTE — TELEPHONE ENCOUNTER
----- Message from Joshua Morales sent at 1/18/2023  3:09 PM CST -----  Regarding: Questions  Daughter would like to speak with nurse in regards treatment plan Dr. Avina discuss, also would she need creatine lab done prior to MRI and should she need to fast.    Contact  [phone]

## 2023-01-23 ENCOUNTER — TELEPHONE (OUTPATIENT)
Dept: HEMATOLOGY/ONCOLOGY | Facility: CLINIC | Age: 68
End: 2023-01-23
Payer: MEDICARE

## 2023-01-23 LAB
DNA RANGE(S) EXAMINED NAR: NORMAL
GENE DIS ANL INTERP-IMP: POSITIVE
GENE DIS ASSESSED: NORMAL
MSI CA SPEC-IMP: NOT DETECTED
REASON FOR STUDY: NORMAL
TEMPUS LCA: NORMAL
TEMPUS PORTAL: NORMAL
TEMPUS TRIAL1: NORMAL
TEMPUS TRIALCOUNT: 1

## 2023-01-23 NOTE — TELEPHONE ENCOUNTER
----- Message from Aaron Arriola sent at 1/23/2023  3:15 PM CST -----  Regarding: Consult/Advisory      Name Of Caller: Dorothy with [employer]      Contact Preference?: [phone]      Provider Name:       What is the nature of the call?: is requesting clinical notes and medical record for the pt. Records can be fax to [phone].  Reference number [id number]

## 2023-01-24 ENCOUNTER — HOSPITAL ENCOUNTER (OUTPATIENT)
Facility: OTHER | Age: 68
Discharge: HOME | End: 2023-01-24
Attending: RADIOLOGY | Admitting: RADIOLOGY
Payer: MEDICARE

## 2023-01-24 ENCOUNTER — HOSPITAL ENCOUNTER (OUTPATIENT)
Dept: RADIOLOGY | Facility: OTHER | Age: 68
Discharge: HOME | End: 2023-01-24
Attending: RADIOLOGY
Payer: MEDICARE

## 2023-01-24 VITALS
DIASTOLIC BLOOD PRESSURE: 65 MMHG | OXYGEN SATURATION: 95 % | HEART RATE: 74 BPM | RESPIRATION RATE: 18 BRPM | TEMPERATURE: 98 F | SYSTOLIC BLOOD PRESSURE: 137 MMHG

## 2023-01-24 DIAGNOSIS — C34.91 NSCLC OF RIGHT LUNG: ICD-10-CM

## 2023-01-24 DIAGNOSIS — R91.8 LUNG MASS: ICD-10-CM

## 2023-01-24 PROCEDURE — 77012 CT SCAN FOR NEEDLE BIOPSY: CPT | Mod: 26,HCNC,, | Performed by: RADIOLOGY

## 2023-01-24 PROCEDURE — 88333 PATH CONSLTJ SURG CYTO XM 1: CPT | Mod: HCNC | Performed by: PATHOLOGY

## 2023-01-24 PROCEDURE — 88342 IMHCHEM/IMCYTCHM 1ST ANTB: CPT | Mod: HCNC | Performed by: PATHOLOGY

## 2023-01-24 PROCEDURE — 99152 MOD SED SAME PHYS/QHP 5/>YRS: CPT | Mod: HCNC,,, | Performed by: RADIOLOGY

## 2023-01-24 PROCEDURE — 99152 PR MOD CONSCIOUS SEDATION, SAME PHYS, 5+ YRS, FIRST 15 MIN: ICD-10-PCS | Mod: HCNC,,, | Performed by: RADIOLOGY

## 2023-01-24 PROCEDURE — 88333 PATH CONSLTJ SURG CYTO XM 1: CPT | Mod: 26,HCNC,, | Performed by: PATHOLOGY

## 2023-01-24 PROCEDURE — 77012 CT SCAN FOR NEEDLE BIOPSY: CPT | Mod: TC,HCNC | Performed by: RADIOLOGY

## 2023-01-24 PROCEDURE — 88341 PR IHC OR ICC EACH ADD'L SINGLE ANTIBODY  STAINPR: ICD-10-PCS | Mod: 26,HCNC,, | Performed by: PATHOLOGY

## 2023-01-24 PROCEDURE — 88341 IMHCHEM/IMCYTCHM EA ADD ANTB: CPT | Mod: 59,HCNC | Performed by: PATHOLOGY

## 2023-01-24 PROCEDURE — 88341 IMHCHEM/IMCYTCHM EA ADD ANTB: CPT | Mod: 26,HCNC,, | Performed by: PATHOLOGY

## 2023-01-24 PROCEDURE — 77012 PR  CT GUIDANCE NEEDLE PLACEMENT: ICD-10-PCS | Mod: 26,HCNC,, | Performed by: RADIOLOGY

## 2023-01-24 PROCEDURE — 88305 TISSUE EXAM BY PATHOLOGIST: CPT | Mod: 26,HCNC,, | Performed by: PATHOLOGY

## 2023-01-24 PROCEDURE — 99152 MOD SED SAME PHYS/QHP 5/>YRS: CPT | Mod: HCNC | Performed by: RADIOLOGY

## 2023-01-24 PROCEDURE — 63600175 PHARM REV CODE 636 W HCPCS: Mod: HCNC | Performed by: RADIOLOGY

## 2023-01-24 PROCEDURE — 47000 IR CT GUIDANCE FOR NEEDLE PLACEMENT: ICD-10-PCS | Mod: HCNC,,, | Performed by: RADIOLOGY

## 2023-01-24 PROCEDURE — 47000 NEEDLE BIOPSY OF LIVER PERQ: CPT | Mod: HCNC,,, | Performed by: RADIOLOGY

## 2023-01-24 PROCEDURE — 25000003 PHARM REV CODE 250: Mod: HCNC | Performed by: RADIOLOGY

## 2023-01-24 PROCEDURE — 88342 CHG IMMUNOCYTOCHEMISTRY: ICD-10-PCS | Mod: 26,HCNC,, | Performed by: PATHOLOGY

## 2023-01-24 PROCEDURE — 88305 TISSUE EXAM BY PATHOLOGIST: ICD-10-PCS | Mod: 26,HCNC,, | Performed by: PATHOLOGY

## 2023-01-24 PROCEDURE — 88342 IMHCHEM/IMCYTCHM 1ST ANTB: CPT | Mod: 26,HCNC,, | Performed by: PATHOLOGY

## 2023-01-24 PROCEDURE — 47000 NEEDLE BIOPSY OF LIVER PERQ: CPT | Mod: HCNC | Performed by: RADIOLOGY

## 2023-01-24 PROCEDURE — 88305 TISSUE EXAM BY PATHOLOGIST: CPT | Mod: HCNC | Performed by: PATHOLOGY

## 2023-01-24 PROCEDURE — 88333 PR  INTRAOPERATIVE CYTO PATH CONSULT, INITIAL SITE: ICD-10-PCS | Mod: 26,HCNC,, | Performed by: PATHOLOGY

## 2023-01-24 RX ORDER — MIDAZOLAM HYDROCHLORIDE 1 MG/ML
INJECTION INTRAMUSCULAR; INTRAVENOUS
Status: DISCONTINUED | OUTPATIENT
Start: 2023-01-24 | End: 2023-01-24 | Stop reason: HOSPADM

## 2023-01-24 RX ORDER — LIDOCAINE HYDROCHLORIDE 10 MG/ML
INJECTION INFILTRATION; PERINEURAL
Status: DISCONTINUED | OUTPATIENT
Start: 2023-01-24 | End: 2023-01-24 | Stop reason: HOSPADM

## 2023-01-24 RX ORDER — FENTANYL CITRATE 50 UG/ML
INJECTION, SOLUTION INTRAMUSCULAR; INTRAVENOUS
Status: DISCONTINUED | OUTPATIENT
Start: 2023-01-24 | End: 2023-01-24 | Stop reason: HOSPADM

## 2023-01-24 NOTE — PLAN OF CARE
Hannah Montoya has met all discharge criteria from Phase II. Vital Signs are stable, ambulating without difficulty. No bleeding or hematoma noted to site.  Discharge instructions given, patient verbalized understanding. Discharged from facility via wheelchair in stable condition.

## 2023-01-24 NOTE — DISCHARGE SUMMARY
Radiology Discharge Summary      Hospital Course: No complications    Admit Date: 1/24/2023  Discharge Date: 01/24/2023     Instructions Given to Patient: Yes  Diet: Resume prior diet  Activity: activity as tolerated and no driving for today    Description of Condition on Discharge: Stable  Vital Signs (Most Recent):      Discharge Disposition: Home    Discharge Diagnosis: lung cancer     Follow-up: per oncology    @SIG@

## 2023-01-24 NOTE — DISCHARGE INSTRUCTIONS
Call MD for:  redness, tenderness, or signs of infection (pain, swelling, redness, odor or green/yellow discharge around incision site  Remove dressing in 24 hours

## 2023-01-24 NOTE — PROCEDURES
Radiology Post-Procedure Note    Pre Op Diagnosis: R lung mass  Post Op Diagnosis: Same    Procedure: biopsy    Procedure performed by: Yehuda Green MD    Written Informed Consent Obtained: Yes  Specimen Removed: YES multiple core specimens  Estimated Blood Loss: Minimal    Findings:   Biopsy of R lower lobe mass.    Patient tolerated procedure well.    @SIG@

## 2023-01-24 NOTE — H&P
Consult/H&P Note  Interventional Radiology    Consult Requested By: oncology    Reason for Consult: lung mass    SUBJECTIVE:     Chief Complaint: lung cancer    History of Present Illness: 66 yo F with known lung cancer, enlarging R lung mass.    Past Medical History:   Diagnosis Date    AICD (automatic cardioverter/defibrillator) present     Asthma     bronchitis in past    Breast cancer 2016    right    Cardiac pacemaker     Cardiomyopathy     COPD (chronic obstructive pulmonary disease)     Diabetes mellitus, type 2     Hyperglycemia     Hyperlipidemia     Hypertension     Malignant neoplasm of overlapping sites of female breast 2016    Nuclear sclerosis of both eyes 2020    Respiratory distress 3/12/2020     Past Surgical History:   Procedure Laterality Date    BREAST BIOPSY Right 2016    IDC    BREAST LUMPECTOMY Right     CARDIAC CATHETERIZATION Bilateral 2017    CARDIAC DEFIBRILLATOR PLACEMENT Left 08/10/2017    CARDIAC DEFIBRILLATOR PLACEMENT Left 2018     SECTION      x2    CHOLECYSTECTOMY      INSERTION OF TUNNELED CENTRAL VENOUS CATHETER (CVC) WITH SUBCUTANEOUS PORT Right 2020    Procedure: [id number], RIGHT;  Surgeon: Josefa Caceres MD;  Location: [address];  Service: General;  Laterality: Right;  Port-a-cath placed to R. IJ    LUNG BIOPSY N/A 2020    Procedure: BIOPSY, LUNG;  Surgeon: Kalpesh Diagnostic Provider;  Location: [facility] OR;  Service: Radiology;  Laterality: N/A;  8AM START  RN PREOP 2020---COVID NEGATIVE    NAVIGATIONAL BRONCHOSCOPY N/A 10/13/2020    Procedure: BRONCHOSCOPY, NAVIGATIONAL;  Surgeon: Jamilah Weaver MD;  Location: [facility] OR [address] [place] [address];  Service: Pulmonary;  Laterality: N/A;    REVISION OF IMPLANTABLE CARDIOVERTER-DEFIBRILLATOR (ICD) ELECTRODE LEAD PLACEMENT N/A 6/15/2018    Procedure: REVISION-LEAD-ICD;  Surgeon: Javy Gates MD;  Location: [facility] CATH LAB;  Service: Cardiology;  Laterality: N/A;  LBBB, Bi-V ICD  HIS Ld rev, MDT, Wilda, MB, 3 Prep    ROBOT-ASSISTED LAPAROSCOPIC LYMPHADENECTOMY USING DA SALVADOR XI Right 10/23/2020    Procedure: XI ROBOTIC LYMPHADENECTOMY;  Surgeon: Ramo Tucker MD;  Location: [facility] OR [address] [place] [address];  Service: Thoracic;  Laterality: Right;    TUBAL LIGATION       Family History   Problem Relation Age of Onset    Kidney disease Mother     Cataracts Mother     Kidney disease Father     Cataracts Father     Clotting disorder Brother     No Known Problems Daughter     No Known Problems Son     No Known Problems Sister     No Known Problems Maternal Aunt     No Known Problems Maternal Uncle     No Known Problems Paternal Aunt     No Known Problems Paternal Uncle     Macular degeneration Maternal Grandmother     No Known Problems Maternal Grandfather     No Known Problems Paternal Grandmother     No Known Problems Paternal Grandfather     Breast cancer Neg Hx     Ovarian cancer Neg Hx     Amblyopia Neg Hx     Blindness Neg Hx     Cancer Neg Hx     Diabetes Neg Hx     Glaucoma Neg Hx     Hypertension Neg Hx     Retinal detachment Neg Hx     Strabismus Neg Hx     Stroke Neg Hx     Thyroid disease Neg Hx      Social History     Tobacco Use    Smoking status: Former     Packs/day: 0.50     Years: 40.00     Pack years: 20.00     Types: Cigarettes     Quit date: 2016     Years since quittin.4     Passive exposure: Past    Smokeless tobacco: Never   Substance Use Topics    Alcohol use: Yes     Alcohol/week: 1.0 standard drink     Types: 1 Glasses of wine per week     Comment: rare- holiday    Drug use: No       Review of Systems:  Constitutional/General:No fever, chills, change in appetite or weight loss.  Hematological/Immuno: no known coagulopathies  Respiratory: no shortness of breath  Cardiovascular: no chest pain  Gastrointestinal: no abdominal pain  Genito-Urinary: no dysuria  Musculoskeletal: negative  Skin: Negative for rash, itching, pigmentation changes, nail or hair changes.  Neurological:  no TIA or stroke symptoms  Psychiatric: normal mood/affect, good insight/judgement      OBJECTIVE:     Vital Signs Range (Last 24H):  Temp:  [98 °F (36.7 °C)]   Pulse:  [92]   Resp:  [18]   BP: (147)/(63)   SpO2:  [95 %]     Physical Exam:  General- Patient alert and oriented x3 in NAD  ENT- PERRLA,  Neck- No masses  CV- Regular rate and rhythm  Resp-  No increased WOB  GI- Non tender/non-distended  Extrem- No cyanosis, clubbing, edema.   Derm- No rashes, masses, or lesions noted  Neuro-  No focal deficits noted.     Physical Exam  There is no height or weight on file to calculate BMI.    Scheduled Meds:   Continuous Infusions:   PRN Meds:    Allergies:   Review of patient's allergies indicates:   Allergen Reactions    Taxol [paclitaxel]      Hypersensitivity reaction to taxol, symptoms included shortness of breath, nausea, dizziness, flushing     Adhesive Rash     tegaderm burns and blistered skin       Labs:  No results for input(s): INR in the last 168 hours.    Invalid input(s):  PT,  PTT  No results for input(s): WBC, HGB, HCT, MCV, PLT in the last 168 hours. No results for input(s): GLU, NA, K, CL, CO2, BUN, CREATININE, CALCIUM, MG, ALT, AST, ALBUMIN, BILITOT, BILIDIR in the last 168 hours.    Vitals (Most Recent):  Temp: 98 °F (36.7 °C) (01/24/23 0812)  Pulse: 92 (01/24/23 0812)  Resp: 18 (01/24/23 0812)  BP: (!) 147/63 (01/24/23 0812)  SpO2: 95 % (01/24/23 0812)    ASA: 2  Mallampati: 2    Consent obtained    ASSESSMENT/PLAN:     Biopsy of R lower lobe mass.  Moderate sedation.    There are no hospital problems to display for this patient.          Yehuda Green MD

## 2023-01-25 ENCOUNTER — DOCUMENTATION ONLY (OUTPATIENT)
Dept: CARDIOTHORACIC SURGERY | Facility: HOSPITAL | Age: 68
End: 2023-01-25
Payer: MEDICARE

## 2023-01-25 NOTE — PATIENT CARE CONFERENCE
[facility]      THORACIC MULTIDISCIPLINARY TUMOR BOARD  PATIENT REVIEW FORM  ________________________________________________________________________    CLINIC #: [id number]  DATE: 01/25/2023    Diagnosis:   NSCLC    PRESENTER:   Dr. 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 Subsequently patient was referred to Radiation oncology for further treatment, 11/17/20-12/30/20 (60 Gy 30 fx). Since that time, patient followed with routine surveillance Chest CT every 6 months. However, on most recent CT C/A/P (12/19/22), imaging revealed interval enlargement of known disease. PT 01/12/23 identified enlarging RLL mass (4.4 x 6.8 cm) with SUV max 19, previously 2.0 x 3.7 cm SUV max 11. Patient referred to medical oncology for additional treatment.     BOARD RECOMMENDATIONS:   Given current stage (IIIA, T3 N1 M0) and interval progression of patient's disease, recommend beginning systemic therapy with chemo and I-O. Pending regression of disease, reevaluate patient for radiation therapy.     CONSULT NEEDED:     [] Surgery    [] Hem/Onc    [] Rad/Onc    [] Dietary                 [] Social Service    [] Psychology       [] Pulmonology    Clinical Stage: Tumor 3 Node(s) 1 Metastasis 0  Pathologic Stage: Tumor 3 Node(s) 1 Metastasis 0    GROUP STAGE:     [] O    [] 1A    [] IB    [] IIA    [] IIB     [x] IIIA     [] IIIB     [] IIIC    [] IV                               [] Local recurrence     [] Regional recurrence     [] Distant recurrence                   [x] NSCLC     [] SCLC     Tumor type     Unstageable:      [] Yes     [] No  Metastatic site(s):          [x] [facility] Treatment Guidelines reviewed and care planned is consistent with guidelines.         (i.e., NCCN, NCI, PD, ACO, AUA, etc.)    PRESENTATION AT CANCER CONFERENCE:         [] Prospective    [] Retrospective     []  Follow-Up          [] Eligible for clinical trial

## 2023-01-27 LAB
ADEQUACY: NORMAL
COMMENT: NORMAL
FINAL PATHOLOGIC DIAGNOSIS: NORMAL
GROSS: NORMAL
Lab: NORMAL

## 2023-01-30 ENCOUNTER — TELEPHONE (OUTPATIENT)
Dept: HEMATOLOGY/ONCOLOGY | Facility: CLINIC | Age: 68
End: 2023-01-30
Payer: MEDICARE

## 2023-01-31 ENCOUNTER — TELEPHONE (OUTPATIENT)
Dept: HEMATOLOGY/ONCOLOGY | Facility: CLINIC | Age: 68
End: 2023-01-31
Payer: MEDICARE

## 2023-01-31 DIAGNOSIS — C34.91 NSCLC OF RIGHT LUNG: Primary | ICD-10-CM

## 2023-01-31 NOTE — TELEPHONE ENCOUNTER
Pt's recent biopsy sent for Tempus testing. Called pt to review. Blood draw done earlier in January and results ready.  Competed [employer] financial assistance. Pt approved for full coverage. Pt explained that her son is also living with her and is awaiting approval for disability. Nurse offered emotional support as well as SW and psychology support. Pt declined at this time but is aware that these services are available if she chooses in the future.

## 2023-02-03 ENCOUNTER — TELEPHONE (OUTPATIENT)
Dept: HEMATOLOGY/ONCOLOGY | Facility: CLINIC | Age: 68
End: 2023-02-03
Payer: MEDICARE

## 2023-02-03 ENCOUNTER — LAB VISIT (OUTPATIENT)
Dept: LAB | Facility: HOSPITAL | Age: 68
End: 2023-02-03
Attending: INTERNAL MEDICINE
Payer: MEDICARE

## 2023-02-03 ENCOUNTER — PATIENT MESSAGE (OUTPATIENT)
Dept: ADMINISTRATIVE | Facility: OTHER | Age: 68
End: 2023-02-03
Payer: MEDICARE

## 2023-02-03 ENCOUNTER — OFFICE VISIT (OUTPATIENT)
Dept: HEMATOLOGY/ONCOLOGY | Facility: CLINIC | Age: 68
End: 2023-02-03
Payer: MEDICARE

## 2023-02-03 VITALS
HEART RATE: 93 BPM | SYSTOLIC BLOOD PRESSURE: 133 MMHG | TEMPERATURE: 98 F | OXYGEN SATURATION: 98 % | WEIGHT: 202.38 LBS | DIASTOLIC BLOOD PRESSURE: 74 MMHG | BODY MASS INDEX: 37.24 KG/M2 | HEIGHT: 62 IN | RESPIRATION RATE: 18 BRPM

## 2023-02-03 DIAGNOSIS — J70.0 RADIATION PNEUMONITIS: ICD-10-CM

## 2023-02-03 DIAGNOSIS — T45.1X5A CHEMOTHERAPY-INDUCED NAUSEA: ICD-10-CM

## 2023-02-03 DIAGNOSIS — C34.91 NSCLC OF RIGHT LUNG: Primary | ICD-10-CM

## 2023-02-03 DIAGNOSIS — E03.9 HYPOTHYROIDISM, UNSPECIFIED TYPE: ICD-10-CM

## 2023-02-03 DIAGNOSIS — R11.0 CHEMOTHERAPY-INDUCED NAUSEA: ICD-10-CM

## 2023-02-03 DIAGNOSIS — Z85.3 HISTORY OF BREAST CANCER IN FEMALE: ICD-10-CM

## 2023-02-03 DIAGNOSIS — I44.7 LEFT BUNDLE-BRANCH BLOCK: ICD-10-CM

## 2023-02-03 DIAGNOSIS — I10 ESSENTIAL HYPERTENSION: ICD-10-CM

## 2023-02-03 DIAGNOSIS — C34.91 NSCLC OF RIGHT LUNG: ICD-10-CM

## 2023-02-03 DIAGNOSIS — I42.0 DILATED CARDIOMYOPATHY: ICD-10-CM

## 2023-02-03 DIAGNOSIS — Z95.810 CARDIAC RESYNCHRONIZATION THERAPY DEFIBRILLATOR (CRT-D) IN PLACE: ICD-10-CM

## 2023-02-03 LAB
ALBUMIN SERPL BCP-MCNC: 3.7 G/DL (ref 3.5–5.2)
ALP SERPL-CCNC: 56 U/L (ref 55–135)
ALT SERPL W/O P-5'-P-CCNC: 26 U/L (ref 10–44)
ANION GAP SERPL CALC-SCNC: 11 MMOL/L (ref 8–16)
AST SERPL-CCNC: 25 U/L (ref 10–40)
BASOPHILS # BLD AUTO: 0.06 K/UL (ref 0–0.2)
BASOPHILS NFR BLD: 0.9 % (ref 0–1.9)
BILIRUB SERPL-MCNC: 0.4 MG/DL (ref 0.1–1)
BUN SERPL-MCNC: 18 MG/DL (ref 8–23)
CALCIUM SERPL-MCNC: 10.1 MG/DL (ref 8.7–10.5)
CHLORIDE SERPL-SCNC: 103 MMOL/L (ref 95–110)
CO2 SERPL-SCNC: 26 MMOL/L (ref 23–29)
CREAT SERPL-MCNC: 1 MG/DL (ref 0.5–1.4)
DIFFERENTIAL METHOD: ABNORMAL
EOSINOPHIL # BLD AUTO: 0.1 K/UL (ref 0–0.5)
EOSINOPHIL NFR BLD: 0.9 % (ref 0–8)
ERYTHROCYTE [DISTWIDTH] IN BLOOD BY AUTOMATED COUNT: 14.4 % (ref 11.5–14.5)
EST. GFR  (NO RACE VARIABLE): >60 ML/MIN/1.73 M^2
GLUCOSE SERPL-MCNC: 253 MG/DL (ref 70–110)
HCT VFR BLD AUTO: 39.1 % (ref 37–48.5)
HGB BLD-MCNC: 12.1 G/DL (ref 12–16)
IMM GRANULOCYTES # BLD AUTO: 0.07 K/UL (ref 0–0.04)
IMM GRANULOCYTES NFR BLD AUTO: 1 % (ref 0–0.5)
LYMPHOCYTES # BLD AUTO: 1 K/UL (ref 1–4.8)
LYMPHOCYTES NFR BLD: 14.9 % (ref 18–48)
MCH RBC QN AUTO: 27.6 PG (ref 27–31)
MCHC RBC AUTO-ENTMCNC: 30.9 G/DL (ref 32–36)
MCV RBC AUTO: 89 FL (ref 82–98)
MONOCYTES # BLD AUTO: 0.6 K/UL (ref 0.3–1)
MONOCYTES NFR BLD: 8.4 % (ref 4–15)
NEUTROPHILS # BLD AUTO: 5.1 K/UL (ref 1.8–7.7)
NEUTROPHILS NFR BLD: 73.9 % (ref 38–73)
NRBC BLD-RTO: 0 /100 WBC
PLATELET # BLD AUTO: 189 K/UL (ref 150–450)
PMV BLD AUTO: 12 FL (ref 9.2–12.9)
POTASSIUM SERPL-SCNC: 5.4 MMOL/L (ref 3.5–5.1)
PROT SERPL-MCNC: 7.8 G/DL (ref 6–8.4)
RBC # BLD AUTO: 4.38 M/UL (ref 4–5.4)
SODIUM SERPL-SCNC: 140 MMOL/L (ref 136–145)
T4 SERPL-MCNC: 9.1 UG/DL (ref 4.5–11.5)
TSH SERPL DL<=0.005 MIU/L-ACNC: 2.44 UIU/ML (ref 0.4–4)
WBC # BLD AUTO: 6.92 K/UL (ref 3.9–12.7)

## 2023-02-03 PROCEDURE — 1157F PR ADVANCE CARE PLAN OR EQUIV PRESENT IN MEDICAL RECORD: ICD-10-PCS | Mod: HCNC,CPTII,S$GLB, | Performed by: INTERNAL MEDICINE

## 2023-02-03 PROCEDURE — 84436 ASSAY OF TOTAL THYROXINE: CPT | Mod: HCNC | Performed by: NURSE PRACTITIONER

## 2023-02-03 PROCEDURE — 1126F PR PAIN SEVERITY QUANTIFIED, NO PAIN PRESENT: ICD-10-PCS | Mod: HCNC,CPTII,S$GLB, | Performed by: INTERNAL MEDICINE

## 2023-02-03 PROCEDURE — 3078F DIAST BP <80 MM HG: CPT | Mod: HCNC,CPTII,S$GLB, | Performed by: INTERNAL MEDICINE

## 2023-02-03 PROCEDURE — 99999 PR PBB SHADOW E&M-EST. PATIENT-LVL V: CPT | Mod: PBBFAC,HCNC,, | Performed by: INTERNAL MEDICINE

## 2023-02-03 PROCEDURE — 1160F RVW MEDS BY RX/DR IN RCRD: CPT | Mod: HCNC,CPTII,S$GLB, | Performed by: INTERNAL MEDICINE

## 2023-02-03 PROCEDURE — 3288F PR FALLS RISK ASSESSMENT DOCUMENTED: ICD-10-PCS | Mod: HCNC,CPTII,S$GLB, | Performed by: INTERNAL MEDICINE

## 2023-02-03 PROCEDURE — 99999 PR PBB SHADOW E&M-EST. PATIENT-LVL V: ICD-10-PCS | Mod: PBBFAC,HCNC,, | Performed by: INTERNAL MEDICINE

## 2023-02-03 PROCEDURE — 3008F BODY MASS INDEX DOCD: CPT | Mod: HCNC,CPTII,S$GLB, | Performed by: INTERNAL MEDICINE

## 2023-02-03 PROCEDURE — 1101F PR PT FALLS ASSESS DOC 0-1 FALLS W/OUT INJ PAST YR: ICD-10-PCS | Mod: HCNC,CPTII,S$GLB, | Performed by: INTERNAL MEDICINE

## 2023-02-03 PROCEDURE — 1159F PR MEDICATION LIST DOCUMENTED IN MEDICAL RECORD: ICD-10-PCS | Mod: HCNC,CPTII,S$GLB, | Performed by: INTERNAL MEDICINE

## 2023-02-03 PROCEDURE — 1101F PT FALLS ASSESS-DOCD LE1/YR: CPT | Mod: HCNC,CPTII,S$GLB, | Performed by: INTERNAL MEDICINE

## 2023-02-03 PROCEDURE — 3075F PR MOST RECENT SYSTOLIC BLOOD PRESS GE 130-139MM HG: ICD-10-PCS | Mod: HCNC,CPTII,S$GLB, | Performed by: INTERNAL MEDICINE

## 2023-02-03 PROCEDURE — 1159F MED LIST DOCD IN RCRD: CPT | Mod: HCNC,CPTII,S$GLB, | Performed by: INTERNAL MEDICINE

## 2023-02-03 PROCEDURE — 99215 OFFICE O/P EST HI 40 MIN: CPT | Mod: HCNC,S$GLB,, | Performed by: INTERNAL MEDICINE

## 2023-02-03 PROCEDURE — 1157F ADVNC CARE PLAN IN RCRD: CPT | Mod: HCNC,CPTII,S$GLB, | Performed by: INTERNAL MEDICINE

## 2023-02-03 PROCEDURE — 1160F PR REVIEW ALL MEDS BY PRESCRIBER/CLIN PHARMACIST DOCUMENTED: ICD-10-PCS | Mod: HCNC,CPTII,S$GLB, | Performed by: INTERNAL MEDICINE

## 2023-02-03 PROCEDURE — 1126F AMNT PAIN NOTED NONE PRSNT: CPT | Mod: HCNC,CPTII,S$GLB, | Performed by: INTERNAL MEDICINE

## 2023-02-03 PROCEDURE — 3078F PR MOST RECENT DIASTOLIC BLOOD PRESSURE < 80 MM HG: ICD-10-PCS | Mod: HCNC,CPTII,S$GLB, | Performed by: INTERNAL MEDICINE

## 2023-02-03 PROCEDURE — 80053 COMPREHEN METABOLIC PANEL: CPT | Mod: HCNC | Performed by: INTERNAL MEDICINE

## 2023-02-03 PROCEDURE — 3288F FALL RISK ASSESSMENT DOCD: CPT | Mod: HCNC,CPTII,S$GLB, | Performed by: INTERNAL MEDICINE

## 2023-02-03 PROCEDURE — 3075F SYST BP GE 130 - 139MM HG: CPT | Mod: HCNC,CPTII,S$GLB, | Performed by: INTERNAL MEDICINE

## 2023-02-03 PROCEDURE — 36415 COLL VENOUS BLD VENIPUNCTURE: CPT | Mod: HCNC | Performed by: INTERNAL MEDICINE

## 2023-02-03 PROCEDURE — 3008F PR BODY MASS INDEX (BMI) DOCUMENTED: ICD-10-PCS | Mod: HCNC,CPTII,S$GLB, | Performed by: INTERNAL MEDICINE

## 2023-02-03 PROCEDURE — 99215 PR OFFICE/OUTPT VISIT, EST, LEVL V, 40-54 MIN: ICD-10-PCS | Mod: HCNC,S$GLB,, | Performed by: INTERNAL MEDICINE

## 2023-02-03 PROCEDURE — 85025 COMPLETE CBC W/AUTO DIFF WBC: CPT | Mod: HCNC | Performed by: INTERNAL MEDICINE

## 2023-02-03 PROCEDURE — 84443 ASSAY THYROID STIM HORMONE: CPT | Mod: HCNC | Performed by: INTERNAL MEDICINE

## 2023-02-03 RX ORDER — OLANZAPINE 5 MG/1
5 TABLET ORAL NIGHTLY
Qty: 30 TABLET | Refills: 1 | Status: SHIPPED | OUTPATIENT
Start: 2023-02-03 | End: 2023-10-03

## 2023-02-03 RX ORDER — ONDANSETRON 8 MG/1
8 TABLET, ORALLY DISINTEGRATING ORAL EVERY 8 HOURS PRN
Qty: 60 TABLET | Refills: 5 | Status: SHIPPED | OUTPATIENT
Start: 2023-02-03

## 2023-02-03 RX ORDER — PROCHLORPERAZINE MALEATE 10 MG
10 TABLET ORAL EVERY 6 HOURS PRN
Qty: 30 TABLET | Refills: 1 | Status: SHIPPED | OUTPATIENT
Start: 2023-02-03 | End: 2024-02-03

## 2023-02-03 NOTE — TELEPHONE ENCOUNTER
Spoke to patient. Told her we can give chemotherapy and immunotherapy through a peripheral IV. She thanked me for calling.

## 2023-02-03 NOTE — TELEPHONE ENCOUNTER
----- Message from Aaron Arriola sent at 2/3/2023  3:56 PM CST -----  Regarding: Consult/Advisory    Name Of Caller:Self      Contact Preference?: [phone]      Nature of Call? Pt is calling because she would like to know how would she be able to get chemo if her port is removed?

## 2023-02-03 NOTE — PROGRESS NOTES
ONCOLOGY FOLLOW UP VISIT.       Cancer/Stage/TNM:    Cancer Staging   NSCLC of right lung  Staging form: Lung, AJCC 8th Edition  - Clinical stage from 9/29/2020: Stage IIIA (cT3, cN1, cM0) - Signed by Ramo Tucker MD on 10/24/2020         Oncology History   NSCLC of right lung   9/29/2020 Cancer Staged    Staging form: Lung, AJCC 8th Edition  - Clinical stage from 9/29/2020: Stage IIIA (cT3, cN1, cM0)       10/14/2020 Initial Diagnosis    NSCLC of right lung     11/17/2020 - 12/30/2020 Radiation Therapy    Treating physician: Harvinder Lara     Site  Technique  Energy  Dose/Fx (Gy)  #Fx  Total Dose (Gy)    RLL Lung  VMAT  6X  2  30 / 30  60       2/6/2023 -  Chemotherapy    Treatment Summary   Plan Name: OP NSCLC NIVOLUMAB 360 MG D1 & D22 WITH IPILIMUMAB 1 MG/KG Q6W PLUS CARBOPLATIN (AUC) PACLITAXEL Q3W X2 DOSES  Treatment Goal: Control  Status: Active  Start Date: 2/6/2023 (Planned)  End Date: 12/30/2024 (Planned)  Provider: Javi Avina MD  Chemotherapy: CARBOplatin (PARAPLATIN) in sodium chloride 0.9% 250 mL chemo infusion, , Intravenous, Clinic/HOD 1 time, 0 of 1 cycle  PACLitaxeL (TAXOL) 200 mg/m2 = 396 mg in sodium chloride 0.9% 500 mL chemo infusion, 200 mg/m2 = 396 mg (original dose ), Intravenous, Clinic/HOD 1 time, 0 of 1 cycle  Dose modification: 200 mg/m2 (Cycle 1), 200 mg/m2 (Cycle 1)            Interval History:   Today, patient reports feeling well overall, no new symptoms. Denies any SOB or difficulty with breathing.     ROS:  Review of Systems   Constitutional:  Negative for chills, fever and weight loss.   HENT:  Negative for hearing loss, nosebleeds and sore throat.    Eyes:  Negative for blurred vision and double vision.   Respiratory:  Negative for cough, hemoptysis and shortness of breath.    Cardiovascular:  Negative for chest pain and leg swelling.   Gastrointestinal:  Positive for diarrhea (occassional (d/t gallbladder removal)). Negative for abdominal pain, blood in stool,  heartburn, nausea and vomiting.   Genitourinary:  Negative for dysuria, frequency and hematuria.   Musculoskeletal:  Negative for back pain, joint pain and myalgias.   Skin:  Negative for itching and rash.   Neurological:  Negative for dizziness, tingling, sensory change, speech change and headaches.   Endo/Heme/Allergies:  Does not bruise/bleed easily.   Psychiatric/Behavioral:  The patient is not nervous/anxious.         A complete 12-point review of systems was reviewed and is negative except as mentioned above.     Past Medical History:   Past Medical History:   Diagnosis Date    AICD (automatic cardioverter/defibrillator) present     Asthma     bronchitis in past    Breast cancer 2016    right    Cardiac pacemaker     Cardiomyopathy     COPD (chronic obstructive pulmonary disease)     Diabetes mellitus, type 2     Hyperglycemia     Hyperlipidemia     Hypertension     Malignant neoplasm of overlapping sites of female breast 2/12/2016    Nuclear sclerosis of both eyes 8/12/2020    Respiratory distress 3/12/2020        Allergies:   Review of patient's allergies indicates:   Allergen Reactions    Taxol [paclitaxel]      Hypersensitivity reaction to taxol, symptoms included shortness of breath, nausea, dizziness, flushing     Adhesive Rash     tegaderm burns and blistered skin        Medications:   Current Outpatient Medications   Medication Sig Dispense Refill    ACCU-CHEK ALFRED PLUS TEST STRP Strp USE TO TEST THREE TIMES DAILY 200 strip 3    amLODIPine (NORVASC) 5 MG tablet Take 1 tablet (5 mg total) by mouth once daily. 90 tablet 0    atorvastatin (LIPITOR) 10 MG tablet Take 1 tablet (10 mg total) by mouth once daily. 90 tablet 3    buPROPion (WELLBUTRIN XL) 150 MG TB24 tablet Take 1 tablet (150 mg total) by mouth once daily. 90 tablet 0    cetirizine (ZYRTEC) 10 MG tablet Take 10 mg by mouth every evening.       fluticasone-salmeterol diskus inhaler 250-50 mcg Inhale 1 puff into the lungs 2 (two) times daily.  Controller 180 each 0    losartan (COZAAR) 100 MG tablet Take 1 tablet (100 mg total) by mouth once daily. 90 tablet 0    metFORMIN (GLUCOPHAGE) 500 MG tablet Take 1 tablet (500 mg total) by mouth 2 (two) times daily. 180 tablet 1    metoprolol succinate (TOPROL-XL) 100 MG 24 hr tablet Take 1 tablet (100 mg total) by mouth once daily. 90 tablet 3    multivitamin (THERAGRAN) per tablet Take 1 tablet by mouth once daily.      pantoprazole (PROTONIX) 40 MG tablet Take 1 tablet (40 mg total) by mouth once daily. 90 tablet 3    sertraline (ZOLOFT) 100 MG tablet Take 1 tablet (100 mg total) by mouth every evening. 90 tablet 0    tiotropium (SPIRIVA WITH HANDIHALER) 18 mcg inhalation capsule INHALE 1 CAPSULE BY MOUTH EVERY DAY. 90 capsule 3    albuterol sulfate (PROAIR RESPICLICK) 90 mcg/actuation AePB Inhale 2 puffs into the lungs every 4 (four) hours as needed. Rescue (Patient not taking: Reported on 2/3/2023) 1 each 5    OLANZapine (ZYPREXA) 5 MG tablet Take 1 tablet (5 mg total) by mouth every evening. Take 1 tablet by mouth every evening on days 1-4 of each chemotherapy cycle 30 tablet 1    OMNIPAQUE 350 350 mg iodine/mL Soln injection       ondansetron (ZOFRAN-ODT) 8 MG TbDL Take 1 tablet (8 mg total) by mouth every 8 (eight) hours as needed (nausea/vomiting). Take 1 tablet (8 mg) by mouth every 8 hours as needed for nausea/vomiting. 60 tablet 5     No current facility-administered medications for this visit.     Facility-Administered Medications Ordered in Other Visits   Medication Dose Route Frequency Provider Last Rate Last Admin    fentaNYL injection 25 mcg  25 mcg Intravenous Q5 Min PRN Keesha Martins MD        haloperidol lactate injection 0.5 mg  0.5 mg Intravenous Once PRN Keesha Martins MD        HYDROmorphone injection 0.2 mg  0.2 mg Intravenous Q5 Min PRN Keesha Martins MD        ondansetron injection 4 mg  4 mg Intravenous Once PRN Keesha Martins MD        sodium chloride 0.9% flush 10 mL  10 mL Intravenous PRN Keesha  "MD Eliezer            Physical Exam:   /74 (BP Location: Left arm, Patient Position: Sitting, BP Method: Large (Automatic))   Pulse 93   Temp 98.4 °F (36.9 °C) (Oral)   Resp 18   Ht 5' 2" (1.575 m)   Wt 91.8 kg (202 lb 6.1 oz)   LMP  (LMP Unknown)   SpO2 98%   BMI 37.02 kg/m²      ECOG Performance Status: (foot note - ECOG PS provided by Eastern Cooperative Oncology Group) 0 - Asymptomatic    Physical Exam  Vitals and nursing note reviewed.   Constitutional:       Appearance: Normal appearance. She is well-developed and normal weight.   HENT:      Head: Normocephalic and atraumatic.   Eyes:      Conjunctiva/sclera: Conjunctivae normal.      Pupils: Pupils are equal, round, and reactive to light.   Pulmonary:      Effort: Pulmonary effort is normal. No respiratory distress.   Abdominal:      General: There is no distension.      Palpations: Abdomen is soft.   Musculoskeletal:         General: No swelling. Normal range of motion.      Cervical back: Normal range of motion and neck supple.   Lymphadenopathy:      Cervical: No cervical adenopathy.   Skin:     General: Skin is warm and dry.   Neurological:      General: No focal deficit present.      Mental Status: She is alert and oriented to person, place, and time.   Psychiatric:         Mood and Affect: Mood and affect normal.         Behavior: Behavior normal.               Labs:   Recent Results (from the past 48 hour(s))   Tumor NGS Tissue    Collection Time: 02/01/23 10:31 PM   Result Value Ref Range    [facility] Portal       [email or web address]    PD-L1 Interpretation by 22C3 positive     PD-L1 (22C3) Combined Positive Score 35     PD-L1 (22C3) Tumor Proportion Score 35 %   CBC Auto Differential    Collection Time: 02/03/23 12:01 PM   Result Value Ref Range    WBC 6.92 3.90 - 12.70 K/uL    RBC 4.38 4.00 - 5.40 M/uL    Hemoglobin 12.1 12.0 - 16.0 " g/dL    Hematocrit 39.1 37.0 - 48.5 %    MCV 89 82 - 98 fL    MCH 27.6 27.0 - 31.0 pg    MCHC 30.9 (L) 32.0 - 36.0 g/dL    RDW 14.4 11.5 - 14.5 %    Platelets 189 150 - 450 K/uL    MPV 12.0 9.2 - 12.9 fL    Immature Granulocytes 1.0 (H) 0.0 - 0.5 %    Gran # (ANC) 5.1 1.8 - 7.7 K/uL    Immature Grans (Abs) 0.07 (H) 0.00 - 0.04 K/uL    Lymph # 1.0 1.0 - 4.8 K/uL    Mono # 0.6 0.3 - 1.0 K/uL    Eos # 0.1 0.0 - 0.5 K/uL    Baso # 0.06 0.00 - 0.20 K/uL    nRBC 0 0 /100 WBC    Gran % 73.9 (H) 38.0 - 73.0 %    Lymph % 14.9 (L) 18.0 - 48.0 %    Mono % 8.4 4.0 - 15.0 %    Eosinophil % 0.9 0.0 - 8.0 %    Basophil % 0.9 0.0 - 1.9 %    Differential Method Automated    Comprehensive Metabolic Panel    Collection Time: 02/03/23 12:01 PM   Result Value Ref Range    Sodium 140 136 - 145 mmol/L    Potassium 5.4 (H) 3.5 - 5.1 mmol/L    Chloride 103 95 - 110 mmol/L    CO2 26 23 - 29 mmol/L    Glucose 253 (H) 70 - 110 mg/dL    BUN 18 8 - 23 mg/dL    Creatinine 1.0 0.5 - 1.4 mg/dL    Calcium 10.1 8.7 - 10.5 mg/dL    Total Protein 7.8 6.0 - 8.4 g/dL    Albumin 3.7 3.5 - 5.2 g/dL    Total Bilirubin 0.4 0.1 - 1.0 mg/dL    Alkaline Phosphatase 56 55 - 135 U/L    AST 25 10 - 40 U/L    ALT 26 10 - 44 U/L    Anion Gap 11 8 - 16 mmol/L    eGFR >60.0 >60 mL/min/1.73 m^2   TSH    Collection Time: 02/03/23 12:01 PM   Result Value Ref Range    TSH 2.441 0.400 - 4.000 uIU/mL   T4    Collection Time: 02/03/23 12:01 PM   Result Value Ref Range    T4, Total 9.1 4.5 - 11.5 ug/dL          Imaging: Personally reviewed CT scans with patient showing new, progressing LLL lung nodule now 8 mm  Interventional Radiology  Procedure performed in the Invasive Lab    - See Procedure Log link below for nursing documentation    - See OpNote on Surgeries Tab for physician findings    - See Imaging Tab for radiologist dictation            Diagnoses:       1. NSCLC of right lung    2. Radiation pneumonitis    3. History of breast cancer in female    4. Dilated  cardiomyopathy    5. Left bundle-branch block    6. Cardiac resynchronization therapy defibrillator (CRT-D) in place    7. Essential hypertension    8. Chemotherapy-induced nausea      Assessment and Plan:         1. Recurrent NSCLC  Plan is for definitive chemoRT, will need re-staging scans prior.  Reviewed with patient in detail her diagnosis, stage, treatment options, and prognosis (3 year OS 66% vs. 43.5% without durvalumab) with standard of care chemoRT followed by maintenance immunotherapy.  Patient has consented for [id number] clinical trial, a randomized control trial evaluating combination chemoRT + durvalumab followed by maintenance vs. SOC chemoRT -> maintenance.  CT scans 7/2021 with CR.  - patient randomized on [id number] to SOC arm (Imfinzi) - completed 12/2021.    - CT scan 12/2022 with progressing pulmonary nodule in LLL, still with stable disease in RLL consolidation. PET scan also showing enlarging right lower lobe mass with increased hypermetabolic activity concerning for recurrence. Will present her case at the next thoracic tumor board to discuss biopsy and possible treatment options.   - Biopsy of lung mass consistent with SCC. Will proceed with 9LA. Scheduling head CT in 2 weeks after vacation. Can consider radiation after 2-3 cycles. Labs acceptable for C1.    Patient consented for immunotherapy 02/03/2023 . An extensive discussion was had which included a thorough discussion of the risk and benefits of treatment and alternatives.  Risks, including but not limited to, fatigue, diarrhea, nausea/vomiting, loss of appetite, skin reactions and rashes, flu-like symptoms, fever, infusion-related reactions including allergic reactions, inflammation of stomach or intestines, inflammation of liver, inflammation of brain or nervous system, inflammation of lungs, inflammation of pancreas, inflammation of kidneys, inflammation of the eyes, inflammation of hormone producing glands, inflammation of the joints  including activation of arthritis, impaired kidney function, impaired liver function, problems with sleep, unstable blood sugars, blood pressure changes, infertility, bone marrow damage (anemia, thrombocytopenia, immune suppression, neutropenia), sterility, local damage at possible injection sites, and rarely death were all discussed.  The patient agrees with the plan, and all questions have been answered to their satisfaction.  Consent was signed the patient, provider, and a third party witness.      Patient was consented for chemotherapy today 2/3/2023 .   An extensive discussion was had which included a thorough discussion of the risk and benefits of treatment and alternatives.  Risks, including but not limited to, possible hair loss, bone marrow damage (anemia, thrombocytopenia, immune suppression, neutropenia), damage to body organs (brain, heart, liver, kidney, lungs, nervous system, skin, and others), allergic reactions, sterility, nausea/vomiting, constipation/diarrhea, sores in the mouth, secondary cancers, local damage at possible injection sites, and rarely death were all discussed.  The patient agrees with the plan, and all questions have been answered to their satisfaction.  Consent was signed the patient, provider, and a third party witness.      2. Grade 2 initially, now resolved. Continue COPD maintenance inhalers and she has duonebs prn.     3. 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      4-7.  Stable, follows with cardiology. AICD placed, but patient now has recovered EF and only takes lasix prn.  NYHA class I.    8. Zyprexa nights 1-4. Zofran and compazine PRN.     Patient was also seen and examined by Dr. Avina. Patient is in agreement with the proposed treatment plan. All questions were answered to the patient's satisfaction. Pt knows to call clinic if anything is needed before the next clinic visit.    Aide Magaña, MSN, APRN, FNP-C  Hematology and Medical  Oncology  Nurse Practitioner to Dr. Javi Avina  Nurse Practitioner, [facility]      I have reviewed the notes, assessments, and/or procedures performed by Aide MONTELONGO, as above.  I have personally interviewed and examined the patient at the beside, and rounded with Aide. I concur with her assessment and plan and the documentation of Hannah Montoya.    MDM includes:    - Acute or chronic illness or injury that poses a threat to life or bodily function  - Independent review and explanation of 2 results from unique tests  - Discussion of management and ordering 2 unique tests  - Extensive discussion of treatment and management  - Drug therapy requiring intensive monitoring for toxicity    Javi Avina M.D.  Hematology/Oncology Attending  [place] [facility]  [facility]          Route Chart for Scheduling    Med Onc Chart Routing      Follow up with physician    Follow up with DANIEL 3 weeks. prior to Opdivo+Carbo+Taxol   Infusion scheduling note    Injection scheduling note    Labs CBC, CMP, magnesium, TSH and free T4   Lab interval:  prior to infusion   Imaging   Ct head in 2 weeks   Pharmacy appointment    Other referrals        Treatment Plan Information   OP NSCLC NIVOLUMAB 360 MG D1 & D22 WITH IPILIMUMAB 1 MG/KG Q6W PLUS CARBOPLATIN (AUC) PACLITAXEL Q3W X2 DOSES   Javi Avina MD   Upcoming Treatment Dates - OP NSCLC NIVOLUMAB 360 MG D1 & D22 WITH IPILIMUMAB 1 MG/KG Q6W PLUS CARBOPLATIN (AUC) PACLITAXEL Q3W X2 DOSES    2/6/2023       Pre-Medications       diphenhydrAMINE (BENADRYL) 50 mg in sodium chloride 0.9% 50 mL IVPB       famotidine (PF) injection 20 mg       Chemotherapy       CARBOplatin (PARAPLATIN) in sodium chloride 0.9% 250 mL chemo infusion       PACLitaxeL (TAXOL) 200 mg/m2 = 396 mg in sodium chloride 0.9% 500 mL chemo infusion       Antiemetics       aprepitant (CINVANTI) injection 130 mg        palonosetron (ALOXI) 0.25 mg with dexamethasone (DECADRON) 20 mg in NS 50 mL IVPB       Immunotherapy       nivolumab 360 mg in sodium chloride 0.9% 86 mL infusion       ipilimumab (YERVOY) 1 mg/kg = 91 mg in sodium chloride 0.9% 68.2 mL chemo infusion  2/27/2023       Pre-Medications       diphenhydrAMINE (BENADRYL) 50 mg in NS 50 mL IVPB       famotidine (PF) injection 20 mg       Chemotherapy       CARBOplatin (PARAPLATIN) in sodium chloride 0.9% 250 mL chemo infusion       PACLitaxeL (TAXOL) 200 mg/m2 = 396 mg in sodium chloride 0.9% 500 mL chemo infusion       Antiemetics       aprepitant (CINVANTI) injection 130 mg       palonosetron 0.25mg/dexAMETHasone 20mg in NS IVPB 0.25 mg 50 mL       Immunotherapy       nivolumab 360 mg in sodium chloride 0.9% 86 mL infusion  3/20/2023       Immunotherapy       nivolumab 360 mg in sodium chloride 0.9% 86 mL infusion       ipilimumab (YERVOY) 1 mg/kg = 91 mg in sodium chloride 0.9% 68.2 mL chemo infusion  4/10/2023       Immunotherapy       nivolumab 360 mg in sodium chloride 0.9% 86 mL infusion

## 2023-02-04 ENCOUNTER — PATIENT MESSAGE (OUTPATIENT)
Dept: ADMINISTRATIVE | Facility: OTHER | Age: 68
End: 2023-02-04
Payer: MEDICARE

## 2023-02-04 ENCOUNTER — PATIENT MESSAGE (OUTPATIENT)
Dept: FAMILY MEDICINE | Facility: CLINIC | Age: 68
End: 2023-02-04
Payer: MEDICARE

## 2023-02-06 ENCOUNTER — PATIENT MESSAGE (OUTPATIENT)
Dept: FAMILY MEDICINE | Facility: CLINIC | Age: 68
End: 2023-02-06
Payer: MEDICARE

## 2023-02-06 ENCOUNTER — INFUSION (OUTPATIENT)
Dept: INFUSION THERAPY | Facility: HOSPITAL | Age: 68
End: 2023-02-06
Payer: MEDICARE

## 2023-02-06 ENCOUNTER — RESEARCH ENCOUNTER (OUTPATIENT)
Dept: RESEARCH | Facility: HOSPITAL | Age: 68
End: 2023-02-06
Payer: MEDICARE

## 2023-02-06 VITALS
DIASTOLIC BLOOD PRESSURE: 65 MMHG | SYSTOLIC BLOOD PRESSURE: 134 MMHG | HEART RATE: 89 BPM | RESPIRATION RATE: 18 BRPM | OXYGEN SATURATION: 98 % | TEMPERATURE: 99 F

## 2023-02-06 DIAGNOSIS — C34.91 NSCLC OF RIGHT LUNG: Primary | ICD-10-CM

## 2023-02-06 DIAGNOSIS — R91.8 LUNG MASS: ICD-10-CM

## 2023-02-06 DIAGNOSIS — E11.9 TYPE 2 DIABETES MELLITUS WITHOUT COMPLICATION, WITHOUT LONG-TERM CURRENT USE OF INSULIN: Primary | ICD-10-CM

## 2023-02-06 PROCEDURE — 96375 TX/PRO/DX INJ NEW DRUG ADDON: CPT | Mod: HCNC

## 2023-02-06 PROCEDURE — 96413 CHEMO IV INFUSION 1 HR: CPT | Mod: HCNC

## 2023-02-06 PROCEDURE — 63600175 PHARM REV CODE 636 W HCPCS: Mod: HCNC | Performed by: INTERNAL MEDICINE

## 2023-02-06 PROCEDURE — 25000003 PHARM REV CODE 250: Mod: HCNC | Performed by: INTERNAL MEDICINE

## 2023-02-06 PROCEDURE — 96417 CHEMO IV INFUS EACH ADDL SEQ: CPT | Mod: HCNC

## 2023-02-06 PROCEDURE — 96415 CHEMO IV INFUSION ADDL HR: CPT | Mod: HCNC

## 2023-02-06 PROCEDURE — 96367 TX/PROPH/DG ADDL SEQ IV INF: CPT | Mod: HCNC

## 2023-02-06 RX ORDER — EPINEPHRINE 0.3 MG/.3ML
0.3 INJECTION SUBCUTANEOUS ONCE AS NEEDED
Status: CANCELLED | OUTPATIENT
Start: 2023-02-06

## 2023-02-06 RX ORDER — DIPHENHYDRAMINE HYDROCHLORIDE 50 MG/ML
50 INJECTION INTRAMUSCULAR; INTRAVENOUS ONCE AS NEEDED
Status: CANCELLED | OUTPATIENT
Start: 2023-02-27

## 2023-02-06 RX ORDER — FAMOTIDINE 10 MG/ML
20 INJECTION INTRAVENOUS
Status: CANCELLED | OUTPATIENT
Start: 2023-02-06

## 2023-02-06 RX ORDER — EPINEPHRINE 0.3 MG/.3ML
0.3 INJECTION SUBCUTANEOUS ONCE AS NEEDED
Status: DISCONTINUED | OUTPATIENT
Start: 2023-02-06 | End: 2023-02-06 | Stop reason: HOSPADM

## 2023-02-06 RX ORDER — DIPHENHYDRAMINE HYDROCHLORIDE 50 MG/ML
50 INJECTION INTRAMUSCULAR; INTRAVENOUS ONCE AS NEEDED
Status: DISCONTINUED | OUTPATIENT
Start: 2023-02-06 | End: 2023-02-06 | Stop reason: HOSPADM

## 2023-02-06 RX ORDER — SODIUM CHLORIDE 0.9 % (FLUSH) 0.9 %
10 SYRINGE (ML) INJECTION
Status: DISCONTINUED | OUTPATIENT
Start: 2023-02-06 | End: 2023-02-06 | Stop reason: HOSPADM

## 2023-02-06 RX ORDER — FAMOTIDINE 10 MG/ML
20 INJECTION INTRAVENOUS
Status: COMPLETED | OUTPATIENT
Start: 2023-02-06 | End: 2023-02-06

## 2023-02-06 RX ORDER — EPINEPHRINE 0.3 MG/.3ML
0.3 INJECTION SUBCUTANEOUS ONCE AS NEEDED
Status: CANCELLED | OUTPATIENT
Start: 2023-02-27

## 2023-02-06 RX ORDER — SODIUM CHLORIDE 0.9 % (FLUSH) 0.9 %
10 SYRINGE (ML) INJECTION
Status: CANCELLED | OUTPATIENT
Start: 2023-02-27

## 2023-02-06 RX ORDER — DIPHENHYDRAMINE HYDROCHLORIDE 50 MG/ML
50 INJECTION INTRAMUSCULAR; INTRAVENOUS ONCE AS NEEDED
Status: CANCELLED | OUTPATIENT
Start: 2023-02-06

## 2023-02-06 RX ORDER — FAMOTIDINE 10 MG/ML
20 INJECTION INTRAVENOUS
Status: CANCELLED | OUTPATIENT
Start: 2023-02-27

## 2023-02-06 RX ORDER — HEPARIN 100 UNIT/ML
500 SYRINGE INTRAVENOUS
Status: DISCONTINUED | OUTPATIENT
Start: 2023-02-06 | End: 2023-02-06 | Stop reason: HOSPADM

## 2023-02-06 RX ORDER — SODIUM CHLORIDE 0.9 % (FLUSH) 0.9 %
10 SYRINGE (ML) INJECTION
Status: CANCELLED | OUTPATIENT
Start: 2023-02-06

## 2023-02-06 RX ORDER — HEPARIN 100 UNIT/ML
500 SYRINGE INTRAVENOUS
Status: CANCELLED | OUTPATIENT
Start: 2023-02-27

## 2023-02-06 RX ORDER — HEPARIN 100 UNIT/ML
500 SYRINGE INTRAVENOUS
Status: CANCELLED | OUTPATIENT
Start: 2023-02-06

## 2023-02-06 RX ADMIN — DIPHENHYDRAMINE HYDROCHLORIDE 50 MG: 50 INJECTION INTRAMUSCULAR; INTRAVENOUS at 11:02

## 2023-02-06 RX ADMIN — PACLITAXEL 396 MG: 6 INJECTION, SOLUTION INTRAVENOUS at 12:02

## 2023-02-06 RX ADMIN — CARBOPLATIN 525 MG: 10 INJECTION INTRAVENOUS at 03:02

## 2023-02-06 RX ADMIN — SODIUM CHLORIDE 360 MG: 9 INJECTION, SOLUTION INTRAVENOUS at 10:02

## 2023-02-06 RX ADMIN — APREPITANT 130 MG: 130 INJECTION, EMULSION INTRAVENOUS at 09:02

## 2023-02-06 RX ADMIN — DEXAMETHASONE SODIUM PHOSPHATE 0.25 MG: 4 INJECTION, SOLUTION INTRA-ARTICULAR; INTRALESIONAL; INTRAMUSCULAR; INTRAVENOUS; SOFT TISSUE at 09:02

## 2023-02-06 RX ADMIN — SODIUM CHLORIDE: 0.9 INJECTION, SOLUTION INTRAVENOUS at 08:02

## 2023-02-06 RX ADMIN — SODIUM CHLORIDE 91 MG: 9 INJECTION, SOLUTION INTRAVENOUS at 11:02

## 2023-02-06 RX ADMIN — FAMOTIDINE 20 MG: 10 INJECTION INTRAVENOUS at 11:02

## 2023-02-06 NOTE — PLAN OF CARE
1610-Pt tolerated C1D1 Taxol, Carbo, Opdivo ,and Yervoy infusions well, no complications or side effects, POC and meds discussed with pt, pt aware of upcoming appts, pt knows to call MD with any questions or concerns. Pt ambulated off unit, no distress noted.

## 2023-02-06 NOTE — PROGRESS NOTES
Protocol: Randomized Phase III Trial of [id number] (Durvalumab) as Concurrent and Consolidative Therapy or Consolidative Therapy Alone for Unresectable Stage 3 NSCLC   Protocol # [id number]          IRB # 2020.326  PI: Carmen Akhtar MD  Treating Physician: Javi Avina MD  Study ID #: [id number]  Patient Initials: L.M.  Assigned ARM: ARM C/ Consolidative Durvalumab     February 6, 2023      Recurrence Peripheral Blood Submission     Notified by physician that patient progressed. Patient agreed to optional specimens at time of consent. I spoke with patient on Friday regarding optional specimens at recurrence, and she continues to voice willingness to participate in this portion of the study. Recurrence peripheral blood collected this morning and submitted per protocol to [facility]. Fed Ex Tracking # [id number]. Patient recently had a tumor tissue biopsy performed as part of standard of care at recurrence. The tissue is currently being used for SOC testing. Will request and send tissue once the physician requested testing has been completed.

## 2023-02-07 ENCOUNTER — NURSE TRIAGE (OUTPATIENT)
Dept: ADMINISTRATIVE | Facility: CLINIC | Age: 68
End: 2023-02-07
Payer: MEDICARE

## 2023-02-07 ENCOUNTER — CLINICAL SUPPORT (OUTPATIENT)
Dept: CARDIOLOGY | Facility: HOSPITAL | Age: 68
End: 2023-02-07
Attending: NURSE PRACTITIONER
Payer: MEDICARE

## 2023-02-07 DIAGNOSIS — Z95.810 PRESENCE OF AUTOMATIC (IMPLANTABLE) CARDIAC DEFIBRILLATOR: ICD-10-CM

## 2023-02-07 DIAGNOSIS — Z00.00 ENCOUNTER FOR MEDICARE ANNUAL WELLNESS EXAM: ICD-10-CM

## 2023-02-07 PROCEDURE — 93296 REM INTERROG EVL PM/IDS: CPT | Mod: HCNC | Performed by: INTERNAL MEDICINE

## 2023-02-07 NOTE — TELEPHONE ENCOUNTER
"Pt calls stating that she had a chemo infusion yesterday. "One of the medications was different. It's called Opdivo." Yesterday evening pt started experiencing hoarseness. Denies any SOB or difficulty swallowing. No fever. No rash or swelling. "My face is red in a few spots but it's definitely not hives."    Care Advice recommends reaching out to the OCP for further guidance. Dr. Starr, OCP, advises that if pt is not having difficulty breathing or swallowing and/or running a fever that she may monitor her symptoms for tonight and reach out to her oncologist in the morning when clinic opens.    Information relayed to pt who verbalizes understanding and is instructed to call back with any new/worsening sxs, questions, or concerns.   Reason for Disposition   [1] Caller has URGENT medicine question about med that PCP or specialist prescribed AND [2] triager unable to answer question    Protocols used: Medication Question Call-A-AH    "

## 2023-02-08 ENCOUNTER — TELEPHONE (OUTPATIENT)
Dept: HEMATOLOGY/ONCOLOGY | Facility: CLINIC | Age: 68
End: 2023-02-08
Payer: MEDICARE

## 2023-02-08 ENCOUNTER — PATIENT MESSAGE (OUTPATIENT)
Dept: ADMINISTRATIVE | Facility: OTHER | Age: 68
End: 2023-02-08
Payer: MEDICARE

## 2023-02-08 LAB
DNA RANGE(S) EXAMINED NAR: NORMAL
GENE DIS ANL INTERP-IMP: POSITIVE
GENE DIS ASSESSED: NORMAL
GENE MUT TESTED BLD/T: 16.8 M/MB
MSI CA SPEC-IMP: NORMAL
PD-L1 BY 22C3 TISS IMSTN DOC: POSITIVE
REASON FOR STUDY: NORMAL
TEMPUS FUSIONADDENDUM: NORMAL
TEMPUS LCA: NORMAL
TEMPUS PD-L1 (22C3) COMBINED POSITIVE SCORE: 35
TEMPUS PD-L1 (22C3) TUMOR PROPORTION SCORE: 35 %
TEMPUS PERTINENTNEGATIVES: NORMAL
TEMPUS PORTAL: NORMAL
TEMPUS TRIAL1: NORMAL
TEMPUS TRIAL2: NORMAL
TEMPUS TRIAL3: NORMAL
TEMPUS TRIALCOUNT: 3

## 2023-02-08 NOTE — TELEPHONE ENCOUNTER
----- Message from Aaron Arriola sent at 2/8/2023 10:20 AM CST -----  Regarding: Consult/Advisory      Name Of Caller: Self      Contact Preference?: [phone]      Nature of Call? Would like to let doctor know that her left ankle is swollen and pt would like to know if it is a side affect from the chemo treatment. Also, Pt would like to know if there is anything she should do?

## 2023-02-08 NOTE — TELEPHONE ENCOUNTER
Spoke to patient. She said her ankle is just a little swollen and she just noticed it. No pain, no other swelling. I encouraged her to keep it elevated as much as possible and call us if it increases or if she starts having any pain or swelling other places. She thanked me for calling.

## 2023-02-09 DIAGNOSIS — Z00.00 ENCOUNTER FOR MEDICARE ANNUAL WELLNESS EXAM: ICD-10-CM

## 2023-02-16 ENCOUNTER — HOSPITAL ENCOUNTER (OUTPATIENT)
Dept: RADIOLOGY | Facility: HOSPITAL | Age: 68
Discharge: HOME | End: 2023-02-16
Attending: NURSE PRACTITIONER
Payer: MEDICARE

## 2023-02-16 ENCOUNTER — PATIENT MESSAGE (OUTPATIENT)
Dept: HEMATOLOGY/ONCOLOGY | Facility: CLINIC | Age: 68
End: 2023-02-16
Payer: MEDICARE

## 2023-02-16 DIAGNOSIS — C34.91 NSCLC OF RIGHT LUNG: ICD-10-CM

## 2023-02-16 PROCEDURE — 25500020 PHARM REV CODE 255: Mod: HCNC | Performed by: NURSE PRACTITIONER

## 2023-02-16 PROCEDURE — 70470 CT HEAD/BRAIN W/O & W/DYE: CPT | Mod: TC,HCNC

## 2023-02-16 PROCEDURE — 70470 CT HEAD WITH AND WITHOUT: ICD-10-PCS | Mod: 26,HCNC,, | Performed by: RADIOLOGY

## 2023-02-16 PROCEDURE — 70470 CT HEAD/BRAIN W/O & W/DYE: CPT | Mod: 26,HCNC,, | Performed by: RADIOLOGY

## 2023-02-16 RX ADMIN — IOHEXOL 75 ML: 350 INJECTION, SOLUTION INTRAVENOUS at 11:02

## 2023-02-20 DIAGNOSIS — C34.91 NSCLC OF RIGHT LUNG: ICD-10-CM

## 2023-02-20 DIAGNOSIS — E03.9 HYPOTHYROIDISM, UNSPECIFIED TYPE: Primary | ICD-10-CM

## 2023-02-23 DIAGNOSIS — I10 BENIGN ESSENTIAL HTN: ICD-10-CM

## 2023-02-23 DIAGNOSIS — F43.21 GRIEF REACTION: ICD-10-CM

## 2023-02-23 RX ORDER — SERTRALINE HYDROCHLORIDE 100 MG/1
TABLET, FILM COATED ORAL
Qty: 90 TABLET | Refills: 3 | Status: SHIPPED | OUTPATIENT
Start: 2023-02-23

## 2023-02-23 RX ORDER — AMLODIPINE BESYLATE 5 MG/1
TABLET ORAL
Qty: 90 TABLET | Refills: 3 | Status: SHIPPED | OUTPATIENT
Start: 2023-02-23

## 2023-02-23 NOTE — TELEPHONE ENCOUNTER
No new care gaps identified.  [facility] Embedded Care Gaps. Reference number: [id number]. 2/23/2023   2:11:13 PM CST

## 2023-02-23 NOTE — TELEPHONE ENCOUNTER
Refill Decision Note   Hannah Montoya  is requesting a refill authorization.  Brief Assessment and Rationale for Refill:  Approve     Medication Therapy Plan:       Medication Reconciliation Completed: No   Comments:     No Care Gaps recommended.     Note composed:3:01 PM 02/23/2023

## 2023-02-24 ENCOUNTER — PATIENT MESSAGE (OUTPATIENT)
Dept: FAMILY MEDICINE | Facility: CLINIC | Age: 68
End: 2023-02-24
Payer: MEDICARE

## 2023-02-24 ENCOUNTER — PATIENT MESSAGE (OUTPATIENT)
Dept: HEMATOLOGY/ONCOLOGY | Facility: CLINIC | Age: 68
End: 2023-02-24
Payer: MEDICARE

## 2023-02-27 ENCOUNTER — OFFICE VISIT (OUTPATIENT)
Dept: HEMATOLOGY/ONCOLOGY | Facility: CLINIC | Age: 68
End: 2023-02-27
Payer: MEDICARE

## 2023-02-27 ENCOUNTER — INFUSION (OUTPATIENT)
Dept: INFUSION THERAPY | Facility: HOSPITAL | Age: 68
End: 2023-02-27
Payer: MEDICARE

## 2023-02-27 VITALS
DIASTOLIC BLOOD PRESSURE: 76 MMHG | RESPIRATION RATE: 18 BRPM | BODY MASS INDEX: 36.68 KG/M2 | WEIGHT: 199.31 LBS | HEART RATE: 97 BPM | HEIGHT: 62 IN | SYSTOLIC BLOOD PRESSURE: 128 MMHG | TEMPERATURE: 99 F | OXYGEN SATURATION: 98 %

## 2023-02-27 VITALS
TEMPERATURE: 98 F | HEIGHT: 62 IN | SYSTOLIC BLOOD PRESSURE: 148 MMHG | RESPIRATION RATE: 18 BRPM | WEIGHT: 202.38 LBS | BODY MASS INDEX: 37.24 KG/M2 | DIASTOLIC BLOOD PRESSURE: 68 MMHG | HEART RATE: 97 BPM

## 2023-02-27 DIAGNOSIS — L27.0 DERMATITIS, DRUG-INDUCED: ICD-10-CM

## 2023-02-27 DIAGNOSIS — Z95.810 CARDIAC RESYNCHRONIZATION THERAPY DEFIBRILLATOR (CRT-D) IN PLACE: ICD-10-CM

## 2023-02-27 DIAGNOSIS — I42.0 DILATED CARDIOMYOPATHY: ICD-10-CM

## 2023-02-27 DIAGNOSIS — I10 ESSENTIAL HYPERTENSION: ICD-10-CM

## 2023-02-27 DIAGNOSIS — C34.91 NSCLC OF RIGHT LUNG: Primary | ICD-10-CM

## 2023-02-27 DIAGNOSIS — Z85.3 HISTORY OF BREAST CANCER IN FEMALE: ICD-10-CM

## 2023-02-27 DIAGNOSIS — L73.9 FOLLICULITIS: ICD-10-CM

## 2023-02-27 DIAGNOSIS — R11.0 CHEMOTHERAPY-INDUCED NAUSEA: ICD-10-CM

## 2023-02-27 DIAGNOSIS — I44.7 LEFT BUNDLE-BRANCH BLOCK: ICD-10-CM

## 2023-02-27 DIAGNOSIS — J70.0 RADIATION PNEUMONITIS: ICD-10-CM

## 2023-02-27 DIAGNOSIS — T45.1X5A CHEMOTHERAPY-INDUCED NAUSEA: ICD-10-CM

## 2023-02-27 DIAGNOSIS — R91.8 LUNG MASS: ICD-10-CM

## 2023-02-27 PROCEDURE — 1157F ADVNC CARE PLAN IN RCRD: CPT | Mod: HCNC,CPTII,S$GLB, | Performed by: NURSE PRACTITIONER

## 2023-02-27 PROCEDURE — 99999 PR PBB SHADOW E&M-EST. PATIENT-LVL V: ICD-10-PCS | Mod: PBBFAC,HCNC,, | Performed by: NURSE PRACTITIONER

## 2023-02-27 PROCEDURE — 99215 PR OFFICE/OUTPT VISIT, EST, LEVL V, 40-54 MIN: ICD-10-PCS | Mod: HCNC,S$GLB,, | Performed by: NURSE PRACTITIONER

## 2023-02-27 PROCEDURE — 96367 TX/PROPH/DG ADDL SEQ IV INF: CPT | Mod: HCNC

## 2023-02-27 PROCEDURE — 1101F PT FALLS ASSESS-DOCD LE1/YR: CPT | Mod: HCNC,CPTII,S$GLB, | Performed by: NURSE PRACTITIONER

## 2023-02-27 PROCEDURE — 25000003 PHARM REV CODE 250: Mod: HCNC | Performed by: INTERNAL MEDICINE

## 2023-02-27 PROCEDURE — 1101F PR PT FALLS ASSESS DOC 0-1 FALLS W/OUT INJ PAST YR: ICD-10-PCS | Mod: HCNC,CPTII,S$GLB, | Performed by: NURSE PRACTITIONER

## 2023-02-27 PROCEDURE — 1160F PR REVIEW ALL MEDS BY PRESCRIBER/CLIN PHARMACIST DOCUMENTED: ICD-10-PCS | Mod: HCNC,CPTII,S$GLB, | Performed by: NURSE PRACTITIONER

## 2023-02-27 PROCEDURE — 1126F PR PAIN SEVERITY QUANTIFIED, NO PAIN PRESENT: ICD-10-PCS | Mod: HCNC,CPTII,S$GLB, | Performed by: NURSE PRACTITIONER

## 2023-02-27 PROCEDURE — 4010F ACE/ARB THERAPY RXD/TAKEN: CPT | Mod: HCNC,CPTII,S$GLB, | Performed by: NURSE PRACTITIONER

## 2023-02-27 PROCEDURE — 1160F RVW MEDS BY RX/DR IN RCRD: CPT | Mod: HCNC,CPTII,S$GLB, | Performed by: NURSE PRACTITIONER

## 2023-02-27 PROCEDURE — 1159F MED LIST DOCD IN RCRD: CPT | Mod: HCNC,CPTII,S$GLB, | Performed by: NURSE PRACTITIONER

## 2023-02-27 PROCEDURE — 63600175 PHARM REV CODE 636 W HCPCS: Mod: JG,HCNC | Performed by: NURSE PRACTITIONER

## 2023-02-27 PROCEDURE — 99999 PR PBB SHADOW E&M-EST. PATIENT-LVL V: CPT | Mod: PBBFAC,HCNC,, | Performed by: NURSE PRACTITIONER

## 2023-02-27 PROCEDURE — 1157F PR ADVANCE CARE PLAN OR EQUIV PRESENT IN MEDICAL RECORD: ICD-10-PCS | Mod: HCNC,CPTII,S$GLB, | Performed by: NURSE PRACTITIONER

## 2023-02-27 PROCEDURE — 1126F AMNT PAIN NOTED NONE PRSNT: CPT | Mod: HCNC,CPTII,S$GLB, | Performed by: NURSE PRACTITIONER

## 2023-02-27 PROCEDURE — 96413 CHEMO IV INFUSION 1 HR: CPT | Mod: HCNC

## 2023-02-27 PROCEDURE — 25000003 PHARM REV CODE 250: Mod: HCNC | Performed by: NURSE PRACTITIONER

## 2023-02-27 PROCEDURE — 96417 CHEMO IV INFUS EACH ADDL SEQ: CPT | Mod: HCNC

## 2023-02-27 PROCEDURE — 3008F BODY MASS INDEX DOCD: CPT | Mod: HCNC,CPTII,S$GLB, | Performed by: NURSE PRACTITIONER

## 2023-02-27 PROCEDURE — 96375 TX/PRO/DX INJ NEW DRUG ADDON: CPT | Mod: HCNC

## 2023-02-27 PROCEDURE — 3008F PR BODY MASS INDEX (BMI) DOCUMENTED: ICD-10-PCS | Mod: HCNC,CPTII,S$GLB, | Performed by: NURSE PRACTITIONER

## 2023-02-27 PROCEDURE — 3078F PR MOST RECENT DIASTOLIC BLOOD PRESSURE < 80 MM HG: ICD-10-PCS | Mod: HCNC,CPTII,S$GLB, | Performed by: NURSE PRACTITIONER

## 2023-02-27 PROCEDURE — 3074F PR MOST RECENT SYSTOLIC BLOOD PRESSURE < 130 MM HG: ICD-10-PCS | Mod: HCNC,CPTII,S$GLB, | Performed by: NURSE PRACTITIONER

## 2023-02-27 PROCEDURE — 63600175 PHARM REV CODE 636 W HCPCS: Mod: HCNC | Performed by: INTERNAL MEDICINE

## 2023-02-27 PROCEDURE — 3078F DIAST BP <80 MM HG: CPT | Mod: HCNC,CPTII,S$GLB, | Performed by: NURSE PRACTITIONER

## 2023-02-27 PROCEDURE — 3074F SYST BP LT 130 MM HG: CPT | Mod: HCNC,CPTII,S$GLB, | Performed by: NURSE PRACTITIONER

## 2023-02-27 PROCEDURE — 1159F PR MEDICATION LIST DOCUMENTED IN MEDICAL RECORD: ICD-10-PCS | Mod: HCNC,CPTII,S$GLB, | Performed by: NURSE PRACTITIONER

## 2023-02-27 PROCEDURE — 99215 OFFICE O/P EST HI 40 MIN: CPT | Mod: HCNC,S$GLB,, | Performed by: NURSE PRACTITIONER

## 2023-02-27 PROCEDURE — 4010F PR ACE/ARB THEARPY RXD/TAKEN: ICD-10-PCS | Mod: HCNC,CPTII,S$GLB, | Performed by: NURSE PRACTITIONER

## 2023-02-27 PROCEDURE — 3288F FALL RISK ASSESSMENT DOCD: CPT | Mod: HCNC,CPTII,S$GLB, | Performed by: NURSE PRACTITIONER

## 2023-02-27 PROCEDURE — 3288F PR FALLS RISK ASSESSMENT DOCUMENTED: ICD-10-PCS | Mod: HCNC,CPTII,S$GLB, | Performed by: NURSE PRACTITIONER

## 2023-02-27 PROCEDURE — 96415 CHEMO IV INFUSION ADDL HR: CPT | Mod: HCNC

## 2023-02-27 RX ORDER — TRIAMCINOLONE ACETONIDE 1 MG/G
CREAM TOPICAL 2 TIMES DAILY
Qty: 453.6 G | Refills: 2 | Status: SHIPPED | OUTPATIENT
Start: 2023-02-27 | End: 2024-02-15

## 2023-02-27 RX ORDER — LANOLIN ALCOHOL/MO/W.PET/CERES
800 CREAM (GRAM) TOPICAL
Status: CANCELLED
Start: 2023-02-27

## 2023-02-27 RX ORDER — MUPIROCIN 20 MG/G
OINTMENT TOPICAL 3 TIMES DAILY
Qty: 30 G | Refills: 1 | Status: SHIPPED | OUTPATIENT
Start: 2023-02-27

## 2023-02-27 RX ORDER — LANOLIN ALCOHOL/MO/W.PET/CERES
800 CREAM (GRAM) TOPICAL
Status: COMPLETED | OUTPATIENT
Start: 2023-02-27 | End: 2023-02-27

## 2023-02-27 RX ORDER — DIPHENHYDRAMINE HYDROCHLORIDE 50 MG/ML
50 INJECTION INTRAMUSCULAR; INTRAVENOUS ONCE AS NEEDED
Status: DISCONTINUED | OUTPATIENT
Start: 2023-02-27 | End: 2023-02-27 | Stop reason: HOSPADM

## 2023-02-27 RX ORDER — EPINEPHRINE 0.3 MG/.3ML
0.3 INJECTION SUBCUTANEOUS ONCE AS NEEDED
Status: DISCONTINUED | OUTPATIENT
Start: 2023-02-27 | End: 2023-02-27 | Stop reason: HOSPADM

## 2023-02-27 RX ORDER — SODIUM CHLORIDE 0.9 % (FLUSH) 0.9 %
10 SYRINGE (ML) INJECTION
Status: DISCONTINUED | OUTPATIENT
Start: 2023-02-27 | End: 2023-02-27 | Stop reason: HOSPADM

## 2023-02-27 RX ORDER — FAMOTIDINE 10 MG/ML
20 INJECTION INTRAVENOUS
Status: COMPLETED | OUTPATIENT
Start: 2023-02-27 | End: 2023-02-27

## 2023-02-27 RX ORDER — HEPARIN 100 UNIT/ML
500 SYRINGE INTRAVENOUS
Status: DISCONTINUED | OUTPATIENT
Start: 2023-02-27 | End: 2023-02-27 | Stop reason: HOSPADM

## 2023-02-27 RX ADMIN — FAMOTIDINE 20 MG: 10 INJECTION INTRAVENOUS at 12:02

## 2023-02-27 RX ADMIN — PACLITAXEL 396 MG: 6 INJECTION, SOLUTION INTRAVENOUS at 12:02

## 2023-02-27 RX ADMIN — DIPHENHYDRAMINE HYDROCHLORIDE 50 MG: 50 INJECTION, SOLUTION INTRAMUSCULAR; INTRAVENOUS at 12:02

## 2023-02-27 RX ADMIN — SODIUM CHLORIDE 360 MG: 9 INJECTION, SOLUTION INTRAVENOUS at 11:02

## 2023-02-27 RX ADMIN — DEXAMETHASONE SODIUM PHOSPHATE 0.25 MG: 4 INJECTION, SOLUTION INTRA-ARTICULAR; INTRALESIONAL; INTRAMUSCULAR; INTRAVENOUS; SOFT TISSUE at 11:02

## 2023-02-27 RX ADMIN — SODIUM CHLORIDE: 0.9 INJECTION, SOLUTION INTRAVENOUS at 09:02

## 2023-02-27 RX ADMIN — APREPITANT 130 MG: 130 INJECTION, EMULSION INTRAVENOUS at 10:02

## 2023-02-27 RX ADMIN — CARBOPLATIN 490 MG: 10 INJECTION, SOLUTION INTRAVENOUS at 03:02

## 2023-02-27 RX ADMIN — Medication 800 MG: at 09:02

## 2023-02-27 NOTE — PROGRESS NOTES
ONCOLOGY FOLLOW UP VISIT.       Cancer/Stage/TNM:    Cancer Staging   NSCLC of right lung  Staging form: Lung, AJCC 8th Edition  - Clinical stage from 9/29/2020: Stage IIIA (cT3, cN1, cM0) - Signed by Ramo Tucker MD on 10/24/2020         Oncology History   NSCLC of right lung   9/29/2020 Cancer Staged    Staging form: Lung, AJCC 8th Edition  - Clinical stage from 9/29/2020: Stage IIIA (cT3, cN1, cM0)       10/14/2020 Initial Diagnosis    NSCLC of right lung     11/17/2020 - 12/30/2020 Radiation Therapy    Treating physician: Harvinder Lara     Site  Technique  Energy  Dose/Fx (Gy)  #Fx  Total Dose (Gy)    RLL Lung  VMAT  6X  2  30 / 30  60       2/6/2023 -  Chemotherapy    Treatment Summary   Plan Name: OP NSCLC NIVOLUMAB 360 MG D1 & D22 WITH IPILIMUMAB 1 MG/KG Q6W PLUS CARBOPLATIN (AUC) PACLITAXEL Q3W X2 DOSES  Treatment Goal: Control  Status: Active  Start Date: 2/6/2023  End Date: 12/30/2024 (Planned)  Provider: Javi Avina MD  Chemotherapy: CARBOplatin (PARAPLATIN) 525 mg in sodium chloride 0.9% 337.5 mL chemo infusion, 525 mg, Intravenous, Clinic/HOD 1 time, 1 of 1 cycle  Administration: 525 mg (2/6/2023)  PACLitaxeL (TAXOL) 200 mg/m2 = 396 mg in sodium chloride 0.9% 500 mL chemo infusion, 200 mg/m2 = 396 mg (100 % of original dose 200 mg/m2), Intravenous, Clinic/HOD 1 time, 1 of 1 cycle  Dose modification: 200 mg/m2 (original dose 200 mg/m2, Cycle 1), 200 mg/m2 (original dose 200 mg/m2, Cycle 1)  Administration: 396 mg (2/6/2023)            Interval History:   Today, patient reports feeling well overall. Denies any SOB or difficulty with breathing. Reports scalp itching and irritation. Shaved her head d/t hair loss. Gold bond powder has helped with itching. Nausea about a week after chemo with one episode of emesis. Controlled with compazine; did not try zofran. Diarrhea for about 2-3 days which resolved with imodium. Very mild left ankle swelling that improved with compression  stockings.    ROS:  Review of Systems   Constitutional:  Negative for chills, fever and weight loss.   HENT:  Negative for hearing loss, nosebleeds and sore throat.    Eyes:  Negative for blurred vision and double vision.   Respiratory:  Negative for cough, hemoptysis and shortness of breath.    Cardiovascular:  Negative for chest pain and leg swelling.   Gastrointestinal:  Positive for diarrhea (occassional (d/t gallbladder removal)). Negative for abdominal pain, blood in stool, heartburn, nausea and vomiting.   Genitourinary:  Negative for dysuria, frequency and hematuria.   Musculoskeletal:  Negative for back pain, joint pain and myalgias.   Skin:  Positive for itching (scalp) and rash (scalp).   Neurological:  Negative for dizziness, tingling, sensory change, speech change and headaches.   Endo/Heme/Allergies:  Does not bruise/bleed easily.   Psychiatric/Behavioral:  The patient is not nervous/anxious.         A complete 12-point review of systems was reviewed and is negative except as mentioned above.     Past Medical History:   Past Medical History:   Diagnosis Date    AICD (automatic cardioverter/defibrillator) present     Asthma     bronchitis in past    Breast cancer 2016    right    Cardiac pacemaker     Cardiomyopathy     COPD (chronic obstructive pulmonary disease)     Diabetes mellitus, type 2     Hyperglycemia     Hyperlipidemia     Hypertension     Malignant neoplasm of overlapping sites of female breast 2/12/2016    Nuclear sclerosis of both eyes 8/12/2020    Respiratory distress 3/12/2020        Allergies:   Review of patient's allergies indicates:   Allergen Reactions    Taxol [paclitaxel]      Hypersensitivity reaction to taxol, symptoms included shortness of breath, nausea, dizziness, flushing     Adhesive Rash     tegaderm burns and blistered skin        Medications:   Current Outpatient Medications   Medication Sig Dispense Refill    ACCU-CHEK ALFRED PLUS TEST STRP Strp USE TO TEST THREE TIMES  DAILY 200 strip 3    albuterol sulfate (PROAIR RESPICLICK) 90 mcg/actuation AePB Inhale 2 puffs into the lungs every 4 (four) hours as needed. Rescue 1 each 5    amLODIPine (NORVASC) 5 MG tablet TAKE 1 TABLET BY MOUTH EVERY DAY 90 tablet 3    atorvastatin (LIPITOR) 10 MG tablet Take 1 tablet (10 mg total) by mouth once daily. 90 tablet 3    buPROPion (WELLBUTRIN XL) 150 MG TB24 tablet Take 1 tablet (150 mg total) by mouth once daily. 90 tablet 0    cetirizine (ZYRTEC) 10 MG tablet Take 10 mg by mouth every evening.       fluticasone-salmeterol diskus inhaler 250-50 mcg Inhale 1 puff into the lungs 2 (two) times daily. Controller 180 each 0    losartan (COZAAR) 100 MG tablet Take 1 tablet (100 mg total) by mouth once daily. 90 tablet 0    metFORMIN (GLUCOPHAGE) 500 MG tablet Take 1 tablet (500 mg total) by mouth 2 (two) times daily. 180 tablet 1    metoprolol succinate (TOPROL-XL) 100 MG 24 hr tablet Take 1 tablet (100 mg total) by mouth once daily. 90 tablet 3    multivitamin (THERAGRAN) per tablet Take 1 tablet by mouth once daily.      OLANZapine (ZYPREXA) 5 MG tablet Take 1 tablet (5 mg total) by mouth every evening. Take 1 tablet by mouth every evening on days 1-4 of each chemotherapy cycle 30 tablet 1    ondansetron (ZOFRAN-ODT) 8 MG TbDL Take 1 tablet (8 mg total) by mouth every 8 (eight) hours as needed (nausea/vomiting). Take 1 tablet (8 mg) by mouth every 8 hours as needed for nausea/vomiting. 60 tablet 5    pantoprazole (PROTONIX) 40 MG tablet Take 1 tablet (40 mg total) by mouth once daily. 90 tablet 3    prochlorperazine (COMPAZINE) 10 MG tablet Take 1 tablet (10 mg total) by mouth every 6 (six) hours as needed (nausea/vomiting - second choice). 30 tablet 1    sertraline (ZOLOFT) 100 MG tablet TAKE 1 TABLET BY MOUTH EVERY EVENING. 90 tablet 3    tiotropium (SPIRIVA WITH HANDIHALER) 18 mcg inhalation capsule INHALE 1 CAPSULE BY MOUTH EVERY DAY. 90 capsule 3    mupirocin (BACTROBAN) 2 % ointment Apply  "topically 3 (three) times daily. 30 g 1    triamcinolone acetonide 0.1% (KENALOG) 0.1 % cream Apply topically 2 (two) times daily. 453.6 g 2     No current facility-administered medications for this visit.     Facility-Administered Medications Ordered in Other Visits   Medication Dose Route Frequency Provider Last Rate Last Admin    alteplase injection 2 mg  2 mg Intra-Catheter PRN Shanna Hopper MD        CARBOplatin (PARAPLATIN) 490 mg in sodium chloride 0.9% 334 mL chemo infusion  490 mg Intravenous 1 time in Clinic/HOD Shanna Hopper MD        diphenhydrAMINE injection 50 mg  50 mg Intravenous Once PRN Shanna Hopper MD        EPINEPHrine (EPIPEN) 0.3 mg/0.3 mL pen injection 0.3 mg  0.3 mg Intramuscular Once PRN Shanna Hopper MD        fentaNYL injection 25 mcg  25 mcg Intravenous Q5 Min PRN Keesha Martins MD        haloperidol lactate injection 0.5 mg  0.5 mg Intravenous Once PRN Keesha Martins MD        heparin, porcine (PF) 100 unit/mL injection flush 500 Units  500 Units Intravenous PRN Shanna Hopper MD        hydrocortisone sodium succinate injection 100 mg  100 mg Intravenous Once PRN Shanna Hopper MD        HYDROmorphone injection 0.2 mg  0.2 mg Intravenous Q5 Min PRN Keesha Martins MD        ondansetron injection 4 mg  4 mg Intravenous Once PRN Keesha Martins MD        sodium chloride 0.9% flush 10 mL  10 mL Intravenous PRN Keesha Martins MD        sodium chloride 0.9% flush 10 mL  10 mL Intravenous PRN Shanna Hopper MD            Physical Exam:   /76 (BP Location: Left arm, Patient Position: Sitting, BP Method: Medium (Automatic))   Pulse 97   Temp 99 °F (37.2 °C) (Oral)   Resp 18   Ht 5' 2" (1.575 m)   Wt 90.4 kg (199 lb 4.7 oz)   LMP  (LMP Unknown)   SpO2 98%   BMI 36.45 kg/m²      ECOG Performance Status: (foot note - ECOG PS provided by Eastern Cooperative Oncology Group) 0 - Asymptomatic    Physical Exam  Vitals and nursing note reviewed.   Constitutional:       Appearance: Normal appearance. She is " well-developed and normal weight.   HENT:      Head: Normocephalic and atraumatic.   Eyes:      Conjunctiva/sclera: Conjunctivae normal.      Pupils: Pupils are equal, round, and reactive to light.   Pulmonary:      Effort: Pulmonary effort is normal. No respiratory distress.   Abdominal:      General: There is no distension.      Palpations: Abdomen is soft.   Musculoskeletal:         General: No swelling. Normal range of motion.      Cervical back: Normal range of motion and neck supple.      Left ankle: Swelling (trace) present.   Lymphadenopathy:      Cervical: No cervical adenopathy.   Skin:     General: Skin is warm and dry.      Findings: Rash (scalp) present.      Comments: Honey crusted lesions to scalp   Neurological:      General: No focal deficit present.      Mental Status: She is alert and oriented to person, place, and time.   Psychiatric:         Mood and Affect: Mood and affect normal.         Behavior: Behavior normal.               Labs:   Recent Results (from the past 48 hour(s))   Magnesium    Collection Time: 02/27/23  8:04 AM   Result Value Ref Range    Magnesium 1.4 (L) 1.6 - 2.6 mg/dL   CBC W/ AUTO DIFFERENTIAL    Collection Time: 02/27/23  8:04 AM   Result Value Ref Range    WBC 6.47 3.90 - 12.70 K/uL    RBC 4.21 4.00 - 5.40 M/uL    Hemoglobin 11.9 (L) 12.0 - 16.0 g/dL    Hematocrit 37.1 37.0 - 48.5 %    MCV 88 82 - 98 fL    MCH 28.3 27.0 - 31.0 pg    MCHC 32.1 32.0 - 36.0 g/dL    RDW 16.1 (H) 11.5 - 14.5 %    Platelets 199 150 - 450 K/uL    MPV 11.6 9.2 - 12.9 fL    Immature Granulocytes 1.1 (H) 0.0 - 0.5 %    Gran # (ANC) 4.6 1.8 - 7.7 K/uL    Immature Grans (Abs) 0.07 (H) 0.00 - 0.04 K/uL    Lymph # 0.9 (L) 1.0 - 4.8 K/uL    Mono # 0.7 0.3 - 1.0 K/uL    Eos # 0.1 0.0 - 0.5 K/uL    Baso # 0.07 0.00 - 0.20 K/uL    nRBC 0 0 /100 WBC    Gran % 70.7 38.0 - 73.0 %    Lymph % 14.5 (L) 18.0 - 48.0 %    Mono % 10.7 4.0 - 15.0 %    Eosinophil % 1.9 0.0 - 8.0 %    Basophil % 1.1 0.0 - 1.9 %     Differential Method Automated    CMP    Collection Time: 02/27/23  8:04 AM   Result Value Ref Range    Sodium 138 136 - 145 mmol/L    Potassium 4.6 3.5 - 5.1 mmol/L    Chloride 104 95 - 110 mmol/L    CO2 23 23 - 29 mmol/L    Glucose 251 (H) 70 - 110 mg/dL    BUN 22 8 - 23 mg/dL    Creatinine 1.1 0.5 - 1.4 mg/dL    Calcium 10.5 8.7 - 10.5 mg/dL    Total Protein 7.4 6.0 - 8.4 g/dL    Albumin 3.6 3.5 - 5.2 g/dL    Total Bilirubin 0.5 0.1 - 1.0 mg/dL    Alkaline Phosphatase 58 55 - 135 U/L    AST 30 10 - 40 U/L    ALT 28 10 - 44 U/L    Anion Gap 11 8 - 16 mmol/L    eGFR 55.8 (A) >60 mL/min/1.73 m^2   TSH    Collection Time: 02/27/23  8:04 AM   Result Value Ref Range    TSH 4.166 (H) 0.400 - 4.000 uIU/mL   FREE T4    Collection Time: 02/27/23  8:04 AM   Result Value Ref Range    Free T4 0.91 0.71 - 1.51 ng/dL          Imaging: Personally reviewed CT scans with patient showing new, progressing LLL lung nodule now 8 mm  CT Head W Wo Contrast  Narrative: EXAMINATION:  CT HEAD WITH AND WITHOUT    CLINICAL HISTORY:  Metastatic disease evaluation; Malignant neoplasm of unspecified part of right bronchus or lung    TECHNIQUE:  Low dose axial CT images obtained throughout the head before and after administration of 75 mL Omnipaque 350 intravenous contrast. Sagittal and coronal reconstructions were performed.    COMPARISON:  10/07/2020    FINDINGS:  Intracranial compartment:    No hydrocephalus.  No extra-axial blood or fluid collections.    There is reidentification of encephalomalacia involving the right parietal and posterior temporal lobes.  No abnormal enhancement..  No parenchymal mass, hemorrhage, edema or major vascular distribution infarct.    Skull/extracranial contents (limited evaluation): No fracture. Mastoid air cells and paranasal sinuses are essentially clear.  Impression: 1. No evidence for intracranial metastases.  2. Stable appearance of encephalomalacia involving the right parietal and posterior temporal  lobes.    Electronically signed by: Maxim Kate MD  Date:    02/16/2023  Time:    13:33            Diagnoses:       1. NSCLC of right lung    2. Radiation pneumonitis    3. History of breast cancer in female    4. Dilated cardiomyopathy    5. Left bundle-branch block    6. Cardiac resynchronization therapy defibrillator (CRT-D) in place    7. Essential hypertension    8. Chemotherapy-induced nausea    9. Folliculitis    10. Dermatitis, drug-induced        Assessment and Plan:         1. Recurrent NSCLC  Plan is for definitive chemoRT, will need re-staging scans prior.  Reviewed with patient in detail her diagnosis, stage, treatment options, and prognosis (3 year OS 66% vs. 43.5% without durvalumab) with standard of care chemoRT followed by maintenance immunotherapy.  Patient has consented for [id number] clinical trial, a randomized control trial evaluating combination chemoRT + durvalumab followed by maintenance vs. SOC chemoRT -> maintenance.  CT scans 7/2021 with CR.  - patient randomized on [id number] to SOC arm (Imfinzi) - completed 12/2021.    - CT scan 12/2022 with progressing pulmonary nodule in LLL, still with stable disease in RLL consolidation. PET scan also showing enlarging right lower lobe mass with increased hypermetabolic activity concerning for recurrence. Will present her case at the next thoracic tumor board to discuss biopsy and possible treatment options.   - Biopsy of lung mass consistent with SCC. Will proceed with 9LA. Scheduling head CT in 2 weeks after vacation. Can consider radiation after 2-3 cycles. Labs acceptable for C1D22.    2. Grade 2 initially, now resolved. Continue COPD maintenance inhalers and she has duonebs prn.     3. 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      4-7.  Stable, follows with cardiology. AICD placed, but patient now has recovered EF and only takes lasix prn.  NYHA class I.    8. Zyprexa nights 1-4. Zofran and compazine PRN.     9,10. Mupirocin  and triamcinolone prescribed.     Patient is in agreement with the proposed treatment plan. All questions were answered to the patient's satisfaction. Pt knows to call clinic if anything is needed before the next clinic visit.    Aide Magaña, MSN, APRN, FNP-C  Hematology and Medical Oncology  Nurse Practitioner to Dr. Javi Avina  Nurse Practitioner, [facility]            Route Chart for Scheduling    Med Onc Chart Routing      Follow up with physician    Follow up with DANIEL 3 weeks. prior to Opdivo+Yervoy   Infusion scheduling note    Injection scheduling note    Labs CBC, CMP, free T4 and TSH   Lab interval: every 3 weeks  prior to infusion   Imaging    Pharmacy appointment    Other referrals        Treatment Plan Information   OP NSCLC NIVOLUMAB 360 MG D1 & D22 WITH IPILIMUMAB 1 MG/KG Q6W PLUS CARBOPLATIN (AUC) PACLITAXEL Q3W X2 DOSES   Javi Avina MD   Upcoming Treatment Dates - OP NSCLC NIVOLUMAB 360 MG D1 & D22 WITH IPILIMUMAB 1 MG/KG Q6W PLUS CARBOPLATIN (AUC) PACLITAXEL Q3W X2 DOSES    3/20/2023       Immunotherapy       nivolumab 360 mg in sodium chloride 0.9% 86 mL infusion       ipilimumab (YERVOY) 1 mg/kg = 91 mg in sodium chloride 0.9% 68.2 mL chemo infusion  4/10/2023       Immunotherapy       nivolumab 360 mg in sodium chloride 0.9% 86 mL infusion  5/1/2023       Immunotherapy       nivolumab 360 mg in sodium chloride 0.9% 86 mL infusion       ipilimumab (YERVOY) 1 mg/kg = 91 mg in sodium chloride 0.9% 68.2 mL chemo infusion  5/22/2023       Immunotherapy       nivolumab 360 mg in sodium chloride 0.9% 86 mL infusion

## 2023-02-27 NOTE — PLAN OF CARE
Did well with treatment. Started having itching in the end of carbo but this was her last one. Made an montserrat't with endocrine.

## 2023-02-28 ENCOUNTER — PATIENT MESSAGE (OUTPATIENT)
Dept: HEMATOLOGY/ONCOLOGY | Facility: CLINIC | Age: 68
End: 2023-02-28
Payer: MEDICARE

## 2023-03-01 ENCOUNTER — PATIENT MESSAGE (OUTPATIENT)
Dept: HEMATOLOGY/ONCOLOGY | Facility: CLINIC | Age: 68
End: 2023-03-01
Payer: MEDICARE

## 2023-03-08 ENCOUNTER — PATIENT MESSAGE (OUTPATIENT)
Dept: ENDOCRINOLOGY | Facility: CLINIC | Age: 68
End: 2023-03-08

## 2023-03-08 ENCOUNTER — PATIENT MESSAGE (OUTPATIENT)
Dept: ADMINISTRATIVE | Facility: OTHER | Age: 68
End: 2023-03-08
Payer: MEDICARE

## 2023-03-08 ENCOUNTER — LAB VISIT (OUTPATIENT)
Dept: LAB | Facility: HOSPITAL | Age: 68
End: 2023-03-08
Attending: INTERNAL MEDICINE
Payer: MEDICARE

## 2023-03-08 ENCOUNTER — OFFICE VISIT (OUTPATIENT)
Dept: ENDOCRINOLOGY | Facility: CLINIC | Age: 68
End: 2023-03-08
Payer: MEDICARE

## 2023-03-08 VITALS
SYSTOLIC BLOOD PRESSURE: 144 MMHG | BODY MASS INDEX: 36.82 KG/M2 | WEIGHT: 200.06 LBS | DIASTOLIC BLOOD PRESSURE: 82 MMHG | HEIGHT: 62 IN

## 2023-03-08 DIAGNOSIS — R94.6 ABNORMAL THYROID FUNCTION TEST: ICD-10-CM

## 2023-03-08 DIAGNOSIS — E66.01 CLASS 2 SEVERE OBESITY DUE TO EXCESS CALORIES WITH SERIOUS COMORBIDITY AND BODY MASS INDEX (BMI) OF 36.0 TO 36.9 IN ADULT: ICD-10-CM

## 2023-03-08 DIAGNOSIS — E11.9 TYPE 2 DIABETES MELLITUS WITHOUT COMPLICATION, WITHOUT LONG-TERM CURRENT USE OF INSULIN: ICD-10-CM

## 2023-03-08 LAB
ESTIMATED AVG GLUCOSE: 186 MG/DL (ref 68–131)
HBA1C MFR BLD: 8.1 % (ref 4–5.6)
THYROPEROXIDASE IGG SERPL-ACNC: <6 IU/ML
TSH SERPL DL<=0.005 MIU/L-ACNC: 2.76 UIU/ML (ref 0.4–4)

## 2023-03-08 PROCEDURE — 1159F MED LIST DOCD IN RCRD: CPT | Mod: HCNC,CPTII,S$GLB, | Performed by: INTERNAL MEDICINE

## 2023-03-08 PROCEDURE — 1157F PR ADVANCE CARE PLAN OR EQUIV PRESENT IN MEDICAL RECORD: ICD-10-PCS | Mod: HCNC,CPTII,S$GLB, | Performed by: INTERNAL MEDICINE

## 2023-03-08 PROCEDURE — 1159F PR MEDICATION LIST DOCUMENTED IN MEDICAL RECORD: ICD-10-PCS | Mod: HCNC,CPTII,S$GLB, | Performed by: INTERNAL MEDICINE

## 2023-03-08 PROCEDURE — 99204 PR OFFICE/OUTPT VISIT, NEW, LEVL IV, 45-59 MIN: ICD-10-PCS | Mod: HCNC,S$GLB,, | Performed by: INTERNAL MEDICINE

## 2023-03-08 PROCEDURE — 99204 OFFICE O/P NEW MOD 45 MIN: CPT | Mod: HCNC,S$GLB,, | Performed by: INTERNAL MEDICINE

## 2023-03-08 PROCEDURE — 4010F ACE/ARB THERAPY RXD/TAKEN: CPT | Mod: HCNC,CPTII,S$GLB, | Performed by: INTERNAL MEDICINE

## 2023-03-08 PROCEDURE — 3288F PR FALLS RISK ASSESSMENT DOCUMENTED: ICD-10-PCS | Mod: HCNC,CPTII,S$GLB, | Performed by: INTERNAL MEDICINE

## 2023-03-08 PROCEDURE — 3077F PR MOST RECENT SYSTOLIC BLOOD PRESSURE >= 140 MM HG: ICD-10-PCS | Mod: HCNC,CPTII,S$GLB, | Performed by: INTERNAL MEDICINE

## 2023-03-08 PROCEDURE — 4010F PR ACE/ARB THEARPY RXD/TAKEN: ICD-10-PCS | Mod: HCNC,CPTII,S$GLB, | Performed by: INTERNAL MEDICINE

## 2023-03-08 PROCEDURE — 99999 PR PBB SHADOW E&M-EST. PATIENT-LVL III: CPT | Mod: PBBFAC,HCNC,, | Performed by: INTERNAL MEDICINE

## 2023-03-08 PROCEDURE — 1157F ADVNC CARE PLAN IN RCRD: CPT | Mod: HCNC,CPTII,S$GLB, | Performed by: INTERNAL MEDICINE

## 2023-03-08 PROCEDURE — 1101F PR PT FALLS ASSESS DOC 0-1 FALLS W/OUT INJ PAST YR: ICD-10-PCS | Mod: HCNC,CPTII,S$GLB, | Performed by: INTERNAL MEDICINE

## 2023-03-08 PROCEDURE — 3288F FALL RISK ASSESSMENT DOCD: CPT | Mod: HCNC,CPTII,S$GLB, | Performed by: INTERNAL MEDICINE

## 2023-03-08 PROCEDURE — 3008F BODY MASS INDEX DOCD: CPT | Mod: HCNC,CPTII,S$GLB, | Performed by: INTERNAL MEDICINE

## 2023-03-08 PROCEDURE — 3008F PR BODY MASS INDEX (BMI) DOCUMENTED: ICD-10-PCS | Mod: HCNC,CPTII,S$GLB, | Performed by: INTERNAL MEDICINE

## 2023-03-08 PROCEDURE — 3079F PR MOST RECENT DIASTOLIC BLOOD PRESSURE 80-89 MM HG: ICD-10-PCS | Mod: HCNC,CPTII,S$GLB, | Performed by: INTERNAL MEDICINE

## 2023-03-08 PROCEDURE — 84443 ASSAY THYROID STIM HORMONE: CPT | Mod: HCNC | Performed by: INTERNAL MEDICINE

## 2023-03-08 PROCEDURE — 83036 HEMOGLOBIN GLYCOSYLATED A1C: CPT | Mod: HCNC | Performed by: INTERNAL MEDICINE

## 2023-03-08 PROCEDURE — 36415 COLL VENOUS BLD VENIPUNCTURE: CPT | Mod: HCNC | Performed by: INTERNAL MEDICINE

## 2023-03-08 PROCEDURE — 3079F DIAST BP 80-89 MM HG: CPT | Mod: HCNC,CPTII,S$GLB, | Performed by: INTERNAL MEDICINE

## 2023-03-08 PROCEDURE — 3077F SYST BP >= 140 MM HG: CPT | Mod: HCNC,CPTII,S$GLB, | Performed by: INTERNAL MEDICINE

## 2023-03-08 PROCEDURE — 1101F PT FALLS ASSESS-DOCD LE1/YR: CPT | Mod: HCNC,CPTII,S$GLB, | Performed by: INTERNAL MEDICINE

## 2023-03-08 PROCEDURE — 86376 MICROSOMAL ANTIBODY EACH: CPT | Mod: HCNC | Performed by: INTERNAL MEDICINE

## 2023-03-08 PROCEDURE — 99999 PR PBB SHADOW E&M-EST. PATIENT-LVL III: ICD-10-PCS | Mod: PBBFAC,HCNC,, | Performed by: INTERNAL MEDICINE

## 2023-03-08 RX ORDER — METFORMIN HYDROCHLORIDE 500 MG/1
1000 TABLET ORAL 2 TIMES DAILY WITH MEALS
Qty: 360 TABLET | Refills: 3 | Status: SHIPPED | OUTPATIENT
Start: 2023-03-08 | End: 2023-08-07

## 2023-03-08 NOTE — PROGRESS NOTES
"Subjective:      Patient ID: Hannah Montoya is a 67 y.o. female.    Chief Complaint:  Diabetes      History of Present Illness    Two rounds of chemo with steroids as part of regimen   Now to start immunotherapy every three weeks, no steroids as part of the regimen.     Current:  176, 273, 322, 273, 291, 208, 254, 235, 269, 260, 312, 173, 246, 210, 258, 242, 221, 218, 182, 227, 272, 224, 224, 376    T2DM  Diagnosed ten years ago.   Known complications: denies     Current Diabetes Regimen:  Metformin 500 mg one tablet twice a day     Prior mediations tried:  Denies other medicine     Appetite/Diet/Exercise:  Appetite is pretty good   Diet is not good  Not exercising regularly     Recent Hgb A1C:  Lab Results   Component Value Date    HGBA1C 6.5 (H) 06/30/2022       Glucose Monitoring:  Meter and strips   1 - 2 times a day almost daily     Hypoglycemic Episodes:  Denies     Screening / DM Complications:    Nephropathy:  ACEi/ARB: Taking  Lab Results   Component Value Date    MICALBCREAT 68.8 (H) 02/03/2020       Last Lipid Panel:  Statin: Taking  Lab Results   Component Value Date    LDLCALC 58.0 (L) 09/27/2022       Neuropathy:denies   Last foot exam : 08/03/2020  Last eye exam : 09/30/2021;  no laser surgery or DR    B12:  No results found for: [id number]    Family history of pancreatic cancer in first-degree relative: denies   Personal history of pancreatitis: denies   Family history of MTC/MEN/endocrine tumors: denies     Review of Systems  Denies recent illness     Objective:   Physical Exam  Vitals:    03/08/23 0959   BP: (!) 144/82   BP Location: Right arm   Patient Position: Sitting   BP Method: Large (Manual)   Weight: 90.8 kg (200 lb 1.1 oz)   Height: 5' 2" (1.575 m)       BP Readings from Last 3 Encounters:   03/08/23 (!) 144/82   02/27/23 (!) 148/68   02/27/23 128/76     Wt Readings from Last 1 Encounters:   03/08/23 0959 90.8 kg (200 lb 1.1 oz)         Body mass index is 36.59 kg/m².    Lab " Review:   Lab Results   Component Value Date    HGBA1C 6.5 (H) 06/30/2022     Lab Results   Component Value Date    CHOL 150 09/27/2022    HDL 43 09/27/2022    LDLCALC 58.0 (L) 09/27/2022    TRIG 245 (H) 09/27/2022    CHOLHDL 28.7 09/27/2022     Lab Results   Component Value Date     02/27/2023    K 4.6 02/27/2023     02/27/2023    CO2 23 02/27/2023     (H) 02/27/2023    BUN 22 02/27/2023    CREATININE 1.1 02/27/2023    CALCIUM 10.5 02/27/2023    PROT 7.4 02/27/2023    ALBUMIN 3.6 02/27/2023    BILITOT 0.5 02/27/2023    ALKPHOS 58 02/27/2023    AST 30 02/27/2023    ALT 28 02/27/2023    ANIONGAP 11 02/27/2023    ESTGFRAFRICA >60.0 06/30/2022    EGFRNONAA 59.7 (A) 06/30/2022    TSH 4.166 (H) 02/27/2023         Assessment and Plan     Type 2 diabetes mellitus without complication  Diagnosed ten years ago   Currently on metformin 500 mg one tab twice a day   Discussed options: insulin v glp 1 agonist v ddp4 inhibitor   Has a good appetite and BMI is 36 --> try ozempic low dose (cost maybe an issue)  Increase metformin to two tablets twice daily --> slowly to avoid GI distress     A1C today   F/u in three months     Abnormal thyroid function test  Mother with thyroid disease   Repeat levels with TPO Ab      Class 2 severe obesity due to excess calories with serious comorbidity and body mass index (BMI) of 36.0 to 36.9 in adult  Discussed diet   Try to decrease amount of carbs   Start ozempic, but avoid drastic changes in appetite or weight loss --> very unusual  F/u in three months

## 2023-03-08 NOTE — ASSESSMENT & PLAN NOTE
Discussed diet   Try to decrease amount of carbs   Start ozempic, but avoid drastic changes in appetite or weight loss --> very unusual  F/u in three months

## 2023-03-08 NOTE — ASSESSMENT & PLAN NOTE
Diagnosed ten years ago   Currently on metformin 500 mg one tab twice a day   Discussed options: insulin v glp 1 agonist v ddp4 inhibitor   Has a good appetite and BMI is 36 --> try ozempic low dose (cost maybe an issue)  Increase metformin to two tablets twice daily --> slowly to avoid GI distress     A1C today   F/u in three months

## 2023-03-13 ENCOUNTER — PATIENT MESSAGE (OUTPATIENT)
Dept: HEMATOLOGY/ONCOLOGY | Facility: CLINIC | Age: 68
End: 2023-03-13
Payer: MEDICARE

## 2023-03-13 ENCOUNTER — PATIENT MESSAGE (OUTPATIENT)
Dept: ADMINISTRATIVE | Facility: OTHER | Age: 68
End: 2023-03-13
Payer: MEDICARE

## 2023-03-13 ENCOUNTER — TELEPHONE (OUTPATIENT)
Dept: HEMATOLOGY/ONCOLOGY | Facility: CLINIC | Age: 68
End: 2023-03-13
Payer: MEDICARE

## 2023-03-13 NOTE — TELEPHONE ENCOUNTER
[facility]ion Intervention    Reason for intervention: Questionnaire response  Comment:  6 BMs per day    Intervention: Other intervention (comment)  Comment:  Portal message sent to triage problem.

## 2023-03-17 ENCOUNTER — PATIENT MESSAGE (OUTPATIENT)
Dept: ADMINISTRATIVE | Facility: OTHER | Age: 68
End: 2023-03-17
Payer: MEDICARE

## 2023-03-20 ENCOUNTER — LAB VISIT (OUTPATIENT)
Dept: LAB | Facility: HOSPITAL | Age: 68
End: 2023-03-20
Attending: INTERNAL MEDICINE
Payer: MEDICARE

## 2023-03-20 ENCOUNTER — INFUSION (OUTPATIENT)
Dept: INFUSION THERAPY | Facility: HOSPITAL | Age: 68
End: 2023-03-20
Payer: MEDICARE

## 2023-03-20 ENCOUNTER — OFFICE VISIT (OUTPATIENT)
Dept: HEMATOLOGY/ONCOLOGY | Facility: CLINIC | Age: 68
End: 2023-03-20
Payer: MEDICARE

## 2023-03-20 VITALS
OXYGEN SATURATION: 95 % | DIASTOLIC BLOOD PRESSURE: 78 MMHG | WEIGHT: 195.56 LBS | SYSTOLIC BLOOD PRESSURE: 161 MMHG | RESPIRATION RATE: 20 BRPM | HEIGHT: 62 IN | HEART RATE: 115 BPM | BODY MASS INDEX: 35.99 KG/M2 | TEMPERATURE: 98 F

## 2023-03-20 VITALS
OXYGEN SATURATION: 96 % | HEART RATE: 88 BPM | DIASTOLIC BLOOD PRESSURE: 78 MMHG | RESPIRATION RATE: 20 BRPM | SYSTOLIC BLOOD PRESSURE: 144 MMHG | TEMPERATURE: 98 F

## 2023-03-20 DIAGNOSIS — Z95.810 CARDIAC RESYNCHRONIZATION THERAPY DEFIBRILLATOR (CRT-D) IN PLACE: ICD-10-CM

## 2023-03-20 DIAGNOSIS — Z85.3 HISTORY OF BREAST CANCER IN FEMALE: ICD-10-CM

## 2023-03-20 DIAGNOSIS — R11.0 CHEMOTHERAPY-INDUCED NAUSEA: ICD-10-CM

## 2023-03-20 DIAGNOSIS — C34.91 NSCLC OF RIGHT LUNG: Primary | ICD-10-CM

## 2023-03-20 DIAGNOSIS — T45.1X5A CHEMOTHERAPY-INDUCED NAUSEA: ICD-10-CM

## 2023-03-20 DIAGNOSIS — I42.0 DILATED CARDIOMYOPATHY: ICD-10-CM

## 2023-03-20 DIAGNOSIS — I44.7 LEFT BUNDLE-BRANCH BLOCK: ICD-10-CM

## 2023-03-20 DIAGNOSIS — E03.9 HYPOTHYROIDISM, UNSPECIFIED TYPE: ICD-10-CM

## 2023-03-20 DIAGNOSIS — I10 ESSENTIAL HYPERTENSION: ICD-10-CM

## 2023-03-20 DIAGNOSIS — J70.0 RADIATION PNEUMONITIS: ICD-10-CM

## 2023-03-20 DIAGNOSIS — R91.8 LUNG MASS: ICD-10-CM

## 2023-03-20 DIAGNOSIS — C34.91 NSCLC OF RIGHT LUNG: ICD-10-CM

## 2023-03-20 LAB
ALBUMIN SERPL BCP-MCNC: 3.7 G/DL (ref 3.5–5.2)
ALP SERPL-CCNC: 62 U/L (ref 55–135)
ALT SERPL W/O P-5'-P-CCNC: 26 U/L (ref 10–44)
ANION GAP SERPL CALC-SCNC: 12 MMOL/L (ref 8–16)
AST SERPL-CCNC: 30 U/L (ref 10–40)
BASOPHILS # BLD AUTO: 0.06 K/UL (ref 0–0.2)
BASOPHILS NFR BLD: 1 % (ref 0–1.9)
BILIRUB SERPL-MCNC: 0.4 MG/DL (ref 0.1–1)
BUN SERPL-MCNC: 16 MG/DL (ref 8–23)
CALCIUM SERPL-MCNC: 10.3 MG/DL (ref 8.7–10.5)
CHLORIDE SERPL-SCNC: 102 MMOL/L (ref 95–110)
CO2 SERPL-SCNC: 25 MMOL/L (ref 23–29)
CREAT SERPL-MCNC: 1.1 MG/DL (ref 0.5–1.4)
DIFFERENTIAL METHOD: ABNORMAL
EOSINOPHIL # BLD AUTO: 0 K/UL (ref 0–0.5)
EOSINOPHIL NFR BLD: 0.7 % (ref 0–8)
ERYTHROCYTE [DISTWIDTH] IN BLOOD BY AUTOMATED COUNT: 17.2 % (ref 11.5–14.5)
EST. GFR  (NO RACE VARIABLE): 55.8 ML/MIN/1.73 M^2
GLUCOSE SERPL-MCNC: 168 MG/DL (ref 70–110)
HCT VFR BLD AUTO: 38.2 % (ref 37–48.5)
HGB BLD-MCNC: 11.8 G/DL (ref 12–16)
IMM GRANULOCYTES # BLD AUTO: 0.06 K/UL (ref 0–0.04)
IMM GRANULOCYTES NFR BLD AUTO: 1 % (ref 0–0.5)
LYMPHOCYTES # BLD AUTO: 1.1 K/UL (ref 1–4.8)
LYMPHOCYTES NFR BLD: 17.8 % (ref 18–48)
MCH RBC QN AUTO: 27.7 PG (ref 27–31)
MCHC RBC AUTO-ENTMCNC: 30.9 G/DL (ref 32–36)
MCV RBC AUTO: 90 FL (ref 82–98)
MONOCYTES # BLD AUTO: 0.5 K/UL (ref 0.3–1)
MONOCYTES NFR BLD: 8.8 % (ref 4–15)
NEUTROPHILS # BLD AUTO: 4.3 K/UL (ref 1.8–7.7)
NEUTROPHILS NFR BLD: 70.7 % (ref 38–73)
NRBC BLD-RTO: 0 /100 WBC
PLATELET # BLD AUTO: 235 K/UL (ref 150–450)
PMV BLD AUTO: 10.9 FL (ref 9.2–12.9)
POTASSIUM SERPL-SCNC: 4.8 MMOL/L (ref 3.5–5.1)
PROT SERPL-MCNC: 7.9 G/DL (ref 6–8.4)
RBC # BLD AUTO: 4.26 M/UL (ref 4–5.4)
SODIUM SERPL-SCNC: 139 MMOL/L (ref 136–145)
T4 FREE SERPL-MCNC: 0.98 NG/DL (ref 0.71–1.51)
TSH SERPL DL<=0.005 MIU/L-ACNC: 3.19 UIU/ML (ref 0.4–4)
WBC # BLD AUTO: 6.02 K/UL (ref 3.9–12.7)

## 2023-03-20 PROCEDURE — 1159F PR MEDICATION LIST DOCUMENTED IN MEDICAL RECORD: ICD-10-PCS | Mod: HCNC,CPTII,S$GLB, | Performed by: NURSE PRACTITIONER

## 2023-03-20 PROCEDURE — 1126F PR PAIN SEVERITY QUANTIFIED, NO PAIN PRESENT: ICD-10-PCS | Mod: HCNC,CPTII,S$GLB, | Performed by: NURSE PRACTITIONER

## 2023-03-20 PROCEDURE — 63600175 PHARM REV CODE 636 W HCPCS: Mod: JG,HCNC | Performed by: NURSE PRACTITIONER

## 2023-03-20 PROCEDURE — 96417 CHEMO IV INFUS EACH ADDL SEQ: CPT | Mod: HCNC

## 2023-03-20 PROCEDURE — 25000003 PHARM REV CODE 250: Mod: HCNC | Performed by: NURSE PRACTITIONER

## 2023-03-20 PROCEDURE — 1159F MED LIST DOCD IN RCRD: CPT | Mod: HCNC,CPTII,S$GLB, | Performed by: NURSE PRACTITIONER

## 2023-03-20 PROCEDURE — 99999 PR PBB SHADOW E&M-EST. PATIENT-LVL V: CPT | Mod: PBBFAC,HCNC,, | Performed by: NURSE PRACTITIONER

## 2023-03-20 PROCEDURE — 3008F PR BODY MASS INDEX (BMI) DOCUMENTED: ICD-10-PCS | Mod: HCNC,CPTII,S$GLB, | Performed by: NURSE PRACTITIONER

## 2023-03-20 PROCEDURE — 1160F PR REVIEW ALL MEDS BY PRESCRIBER/CLIN PHARMACIST DOCUMENTED: ICD-10-PCS | Mod: HCNC,CPTII,S$GLB, | Performed by: NURSE PRACTITIONER

## 2023-03-20 PROCEDURE — 3077F SYST BP >= 140 MM HG: CPT | Mod: HCNC,CPTII,S$GLB, | Performed by: NURSE PRACTITIONER

## 2023-03-20 PROCEDURE — 80053 COMPREHEN METABOLIC PANEL: CPT | Mod: HCNC | Performed by: INTERNAL MEDICINE

## 2023-03-20 PROCEDURE — 84439 ASSAY OF FREE THYROXINE: CPT | Mod: HCNC | Performed by: INTERNAL MEDICINE

## 2023-03-20 PROCEDURE — 1101F PT FALLS ASSESS-DOCD LE1/YR: CPT | Mod: HCNC,CPTII,S$GLB, | Performed by: NURSE PRACTITIONER

## 2023-03-20 PROCEDURE — 99215 OFFICE O/P EST HI 40 MIN: CPT | Mod: HCNC,S$GLB,, | Performed by: NURSE PRACTITIONER

## 2023-03-20 PROCEDURE — 3288F PR FALLS RISK ASSESSMENT DOCUMENTED: ICD-10-PCS | Mod: HCNC,CPTII,S$GLB, | Performed by: NURSE PRACTITIONER

## 2023-03-20 PROCEDURE — 1101F PR PT FALLS ASSESS DOC 0-1 FALLS W/OUT INJ PAST YR: ICD-10-PCS | Mod: HCNC,CPTII,S$GLB, | Performed by: NURSE PRACTITIONER

## 2023-03-20 PROCEDURE — 3078F DIAST BP <80 MM HG: CPT | Mod: HCNC,CPTII,S$GLB, | Performed by: NURSE PRACTITIONER

## 2023-03-20 PROCEDURE — 3077F PR MOST RECENT SYSTOLIC BLOOD PRESSURE >= 140 MM HG: ICD-10-PCS | Mod: HCNC,CPTII,S$GLB, | Performed by: NURSE PRACTITIONER

## 2023-03-20 PROCEDURE — 84443 ASSAY THYROID STIM HORMONE: CPT | Mod: HCNC | Performed by: INTERNAL MEDICINE

## 2023-03-20 PROCEDURE — 4010F PR ACE/ARB THEARPY RXD/TAKEN: ICD-10-PCS | Mod: HCNC,CPTII,S$GLB, | Performed by: NURSE PRACTITIONER

## 2023-03-20 PROCEDURE — 1157F ADVNC CARE PLAN IN RCRD: CPT | Mod: HCNC,CPTII,S$GLB, | Performed by: NURSE PRACTITIONER

## 2023-03-20 PROCEDURE — 99215 PR OFFICE/OUTPT VISIT, EST, LEVL V, 40-54 MIN: ICD-10-PCS | Mod: HCNC,S$GLB,, | Performed by: NURSE PRACTITIONER

## 2023-03-20 PROCEDURE — 99999 PR PBB SHADOW E&M-EST. PATIENT-LVL V: ICD-10-PCS | Mod: PBBFAC,HCNC,, | Performed by: NURSE PRACTITIONER

## 2023-03-20 PROCEDURE — 4010F ACE/ARB THERAPY RXD/TAKEN: CPT | Mod: HCNC,CPTII,S$GLB, | Performed by: NURSE PRACTITIONER

## 2023-03-20 PROCEDURE — 3052F HG A1C>EQUAL 8.0%<EQUAL 9.0%: CPT | Mod: HCNC,CPTII,S$GLB, | Performed by: NURSE PRACTITIONER

## 2023-03-20 PROCEDURE — 3078F PR MOST RECENT DIASTOLIC BLOOD PRESSURE < 80 MM HG: ICD-10-PCS | Mod: HCNC,CPTII,S$GLB, | Performed by: NURSE PRACTITIONER

## 2023-03-20 PROCEDURE — 36415 COLL VENOUS BLD VENIPUNCTURE: CPT | Mod: HCNC | Performed by: INTERNAL MEDICINE

## 2023-03-20 PROCEDURE — 96413 CHEMO IV INFUSION 1 HR: CPT | Mod: HCNC

## 2023-03-20 PROCEDURE — 3008F BODY MASS INDEX DOCD: CPT | Mod: HCNC,CPTII,S$GLB, | Performed by: NURSE PRACTITIONER

## 2023-03-20 PROCEDURE — 3052F PR MOST RECENT HEMOGLOBIN A1C LEVEL 8.0 - < 9.0%: ICD-10-PCS | Mod: HCNC,CPTII,S$GLB, | Performed by: NURSE PRACTITIONER

## 2023-03-20 PROCEDURE — 1160F RVW MEDS BY RX/DR IN RCRD: CPT | Mod: HCNC,CPTII,S$GLB, | Performed by: NURSE PRACTITIONER

## 2023-03-20 PROCEDURE — 1126F AMNT PAIN NOTED NONE PRSNT: CPT | Mod: HCNC,CPTII,S$GLB, | Performed by: NURSE PRACTITIONER

## 2023-03-20 PROCEDURE — 1157F PR ADVANCE CARE PLAN OR EQUIV PRESENT IN MEDICAL RECORD: ICD-10-PCS | Mod: HCNC,CPTII,S$GLB, | Performed by: NURSE PRACTITIONER

## 2023-03-20 PROCEDURE — 3288F FALL RISK ASSESSMENT DOCD: CPT | Mod: HCNC,CPTII,S$GLB, | Performed by: NURSE PRACTITIONER

## 2023-03-20 PROCEDURE — 85025 COMPLETE CBC W/AUTO DIFF WBC: CPT | Mod: HCNC | Performed by: INTERNAL MEDICINE

## 2023-03-20 RX ORDER — HEPARIN 100 UNIT/ML
500 SYRINGE INTRAVENOUS
Status: CANCELLED | OUTPATIENT
Start: 2023-03-20

## 2023-03-20 RX ORDER — EPINEPHRINE 0.3 MG/.3ML
0.3 INJECTION SUBCUTANEOUS ONCE AS NEEDED
Status: CANCELLED | OUTPATIENT
Start: 2023-03-20

## 2023-03-20 RX ORDER — EPINEPHRINE 0.3 MG/.3ML
0.3 INJECTION SUBCUTANEOUS ONCE AS NEEDED
Status: DISCONTINUED | OUTPATIENT
Start: 2023-03-20 | End: 2023-03-20 | Stop reason: HOSPADM

## 2023-03-20 RX ORDER — SODIUM CHLORIDE 0.9 % (FLUSH) 0.9 %
10 SYRINGE (ML) INJECTION
Status: DISCONTINUED | OUTPATIENT
Start: 2023-03-20 | End: 2023-03-20 | Stop reason: HOSPADM

## 2023-03-20 RX ORDER — DIPHENHYDRAMINE HYDROCHLORIDE 50 MG/ML
50 INJECTION INTRAMUSCULAR; INTRAVENOUS ONCE AS NEEDED
Status: DISCONTINUED | OUTPATIENT
Start: 2023-03-20 | End: 2023-03-20 | Stop reason: HOSPADM

## 2023-03-20 RX ORDER — HEPARIN 100 UNIT/ML
500 SYRINGE INTRAVENOUS
Status: DISCONTINUED | OUTPATIENT
Start: 2023-03-20 | End: 2023-03-20 | Stop reason: HOSPADM

## 2023-03-20 RX ORDER — DIPHENHYDRAMINE HYDROCHLORIDE 50 MG/ML
50 INJECTION INTRAMUSCULAR; INTRAVENOUS ONCE AS NEEDED
Status: CANCELLED | OUTPATIENT
Start: 2023-03-20

## 2023-03-20 RX ORDER — SODIUM CHLORIDE 0.9 % (FLUSH) 0.9 %
10 SYRINGE (ML) INJECTION
Status: CANCELLED | OUTPATIENT
Start: 2023-03-20

## 2023-03-20 RX ADMIN — SODIUM CHLORIDE 360 MG: 9 INJECTION, SOLUTION INTRAVENOUS at 12:03

## 2023-03-20 RX ADMIN — SODIUM CHLORIDE 91 MG: 9 INJECTION, SOLUTION INTRAVENOUS at 12:03

## 2023-03-20 RX ADMIN — SODIUM CHLORIDE: 9 INJECTION, SOLUTION INTRAVENOUS at 12:03

## 2023-03-20 NOTE — PLAN OF CARE
1310 Patient tolerated opdivo/yervoy with no s/s of reaction and no complaints. PIV removed and catheter tip intact. Patient declined the AVS. Instructed her to call MD office for any concerns. She ambulated out.

## 2023-03-20 NOTE — PROGRESS NOTES
ONCOLOGY FOLLOW UP VISIT.       Cancer/Stage/TNM:    Cancer Staging   NSCLC of right lung  Staging form: Lung, AJCC 8th Edition  - Clinical stage from 9/29/2020: Stage IIIA (cT3, cN1, cM0) - Signed by Ramo Tucker MD on 10/24/2020         Oncology History   NSCLC of right lung   9/29/2020 Cancer Staged    Staging form: Lung, AJCC 8th Edition  - Clinical stage from 9/29/2020: Stage IIIA (cT3, cN1, cM0)       10/14/2020 Initial Diagnosis    NSCLC of right lung     11/17/2020 - 12/30/2020 Radiation Therapy    Treating physician: Harvinder Lara     Site  Technique  Energy  Dose/Fx (Gy)  #Fx  Total Dose (Gy)    RLL Lung  VMAT  6X  2  30 / 30  60       2/6/2023 -  Chemotherapy    Treatment Summary   Plan Name: OP NSCLC NIVOLUMAB 360 MG D1 & D22 WITH IPILIMUMAB 1 MG/KG Q6W PLUS CARBOPLATIN (AUC) PACLITAXEL Q3W X2 DOSES  Treatment Goal: Control  Status: Active  Start Date: 2/6/2023  End Date: 12/30/2024 (Planned)  Provider: Javi Avina MD  Chemotherapy: CARBOplatin (PARAPLATIN) 525 mg in sodium chloride 0.9% 337.5 mL chemo infusion, 525 mg, Intravenous, Clinic/HOD 1 time, 1 of 1 cycle  Administration: 525 mg (2/6/2023), 490 mg (2/27/2023)  PACLitaxeL (TAXOL) 200 mg/m2 = 396 mg in sodium chloride 0.9% 500 mL chemo infusion, 200 mg/m2 = 396 mg (100 % of original dose 200 mg/m2), Intravenous, Clinic/HOD 1 time, 1 of 1 cycle  Dose modification: 200 mg/m2 (original dose 200 mg/m2, Cycle 1), 200 mg/m2 (original dose 200 mg/m2, Cycle 1)  Administration: 396 mg (2/6/2023), 396 mg (2/27/2023)            Interval History:   Today, patient reports feeling well overall. Denies any SOB or difficulty with breathing. Reports scalp itching and irritation has resolved. Nausea is mild. Controlled with compazine; did not try zofran. Diarrhea has resolved. Weight loss since starting Ozempic. Doing chair yoga at home. Feels energy is better.     ROS:  Review of Systems   Constitutional:  Negative for chills, fever and weight loss.    HENT:  Negative for hearing loss, nosebleeds and sore throat.    Eyes:  Negative for blurred vision and double vision.   Respiratory:  Negative for cough, hemoptysis and shortness of breath.    Cardiovascular:  Negative for chest pain and leg swelling.   Gastrointestinal:  Positive for diarrhea (occassional (d/t gallbladder removal)). Negative for abdominal pain, blood in stool, heartburn, nausea and vomiting.   Genitourinary:  Negative for dysuria, frequency and hematuria.   Musculoskeletal:  Negative for back pain, joint pain and myalgias.   Skin:  Negative for itching and rash.   Neurological:  Negative for dizziness, tingling, sensory change, speech change and headaches.   Endo/Heme/Allergies:  Does not bruise/bleed easily.   Psychiatric/Behavioral:  The patient is not nervous/anxious.         A complete 12-point review of systems was reviewed and is negative except as mentioned above.     Past Medical History:   Past Medical History:   Diagnosis Date    AICD (automatic cardioverter/defibrillator) present     Asthma     bronchitis in past    Breast cancer 2016    right    Cardiac pacemaker     Cardiomyopathy     COPD (chronic obstructive pulmonary disease)     Diabetes mellitus, type 2     Hyperglycemia     Hyperlipidemia     Hypertension     Malignant neoplasm of overlapping sites of female breast 2/12/2016    Nuclear sclerosis of both eyes 8/12/2020    Respiratory distress 3/12/2020        Allergies:   Review of patient's allergies indicates:   Allergen Reactions    Taxol [paclitaxel]      Hypersensitivity reaction to taxol, symptoms included shortness of breath, nausea, dizziness, flushing     Carboplatin Other (See Comments)     Itching and hives    Adhesive Rash     tegaderm burns and blistered skin        Medications:   Current Outpatient Medications   Medication Sig Dispense Refill    metFORMIN (GLUCOPHAGE) 500 MG tablet Take 2 tablets (1,000 mg total) by mouth 2 (two) times daily with meals. 360 tablet  3    ACCU-CHEK ALFRED PLUS TEST STRP Strp USE TO TEST THREE TIMES DAILY 200 strip 3    albuterol sulfate (PROAIR RESPICLICK) 90 mcg/actuation AePB Inhale 2 puffs into the lungs every 4 (four) hours as needed. Rescue 1 each 5    amLODIPine (NORVASC) 5 MG tablet TAKE 1 TABLET BY MOUTH EVERY DAY 90 tablet 3    atorvastatin (LIPITOR) 10 MG tablet Take 1 tablet (10 mg total) by mouth once daily. 90 tablet 3    buPROPion (WELLBUTRIN XL) 150 MG TB24 tablet Take 1 tablet (150 mg total) by mouth once daily. 90 tablet 0    cetirizine (ZYRTEC) 10 MG tablet Take 10 mg by mouth every evening.       fluticasone-salmeterol diskus inhaler 250-50 mcg Inhale 1 puff into the lungs 2 (two) times daily. Controller 180 each 0    losartan (COZAAR) 100 MG tablet Take 1 tablet (100 mg total) by mouth once daily. 90 tablet 0    metoprolol succinate (TOPROL-XL) 100 MG 24 hr tablet Take 1 tablet (100 mg total) by mouth once daily. 90 tablet 3    multivitamin (THERAGRAN) per tablet Take 1 tablet by mouth once daily.      mupirocin (BACTROBAN) 2 % ointment Apply topically 3 (three) times daily. 30 g 1    OLANZapine (ZYPREXA) 5 MG tablet Take 1 tablet (5 mg total) by mouth every evening. Take 1 tablet by mouth every evening on days 1-4 of each chemotherapy cycle 30 tablet 1    ondansetron (ZOFRAN-ODT) 8 MG TbDL Take 1 tablet (8 mg total) by mouth every 8 (eight) hours as needed (nausea/vomiting). Take 1 tablet (8 mg) by mouth every 8 hours as needed for nausea/vomiting. 60 tablet 5    pantoprazole (PROTONIX) 40 MG tablet Take 1 tablet (40 mg total) by mouth once daily. 90 tablet 3    prochlorperazine (COMPAZINE) 10 MG tablet Take 1 tablet (10 mg total) by mouth every 6 (six) hours as needed (nausea/vomiting - second choice). 30 tablet 1    semaglutide (OZEMPIC) 0.25 mg or 0.5 mg(2 mg/1.5 mL) pen injector Take 0.25 mg for 4 weeks, then increase to 0.5 mg weekly 4 pen 3    sertraline (ZOLOFT) 100 MG tablet TAKE 1 TABLET BY MOUTH EVERY EVENING. 90  "tablet 3    tiotropium (SPIRIVA WITH HANDIHALER) 18 mcg inhalation capsule INHALE 1 CAPSULE BY MOUTH EVERY DAY. 90 capsule 3    triamcinolone acetonide 0.1% (KENALOG) 0.1 % cream Apply topically 2 (two) times daily. 453.6 g 2     No current facility-administered medications for this visit.     Facility-Administered Medications Ordered in Other Visits   Medication Dose Route Frequency Provider Last Rate Last Admin    fentaNYL injection 25 mcg  25 mcg Intravenous Q5 Min PRN Keesha Martins MD        haloperidol lactate injection 0.5 mg  0.5 mg Intravenous Once PRN Keesha Martins MD        HYDROmorphone injection 0.2 mg  0.2 mg Intravenous Q5 Min PRN Keesha Martins MD        ondansetron injection 4 mg  4 mg Intravenous Once PRN Keesha Martins MD        sodium chloride 0.9% flush 10 mL  10 mL Intravenous PRN Keesha Martins MD            Physical Exam:   BP (!) 161/78 (BP Location: Right arm, Patient Position: Sitting, BP Method: Medium (Automatic))   Pulse (!) 115   Temp 98.4 °F (36.9 °C) (Oral)   Resp 20   Ht 5' 2" (1.575 m)   Wt 88.7 kg (195 lb 8.8 oz)   LMP  (LMP Unknown)   SpO2 95%   BMI 35.77 kg/m²      ECOG Performance Status: (foot note - ECOG PS provided by Eastern Cooperative Oncology Group) 0 - Asymptomatic    Physical Exam  Vitals and nursing note reviewed.   Constitutional:       Appearance: Normal appearance. She is well-developed and normal weight.   HENT:      Head: Normocephalic and atraumatic.   Eyes:      Conjunctiva/sclera: Conjunctivae normal.      Pupils: Pupils are equal, round, and reactive to light.   Pulmonary:      Effort: Pulmonary effort is normal. No respiratory distress.   Abdominal:      General: There is no distension.      Palpations: Abdomen is soft.   Musculoskeletal:         General: No swelling. Normal range of motion.      Cervical back: Normal range of motion and neck supple.      Left ankle: Swelling (trace) present.   Lymphadenopathy:      Cervical: No cervical adenopathy.   Skin:     " General: Skin is warm and dry.      Findings: Rash (scalp) present.      Comments: Honey crusted lesions to scalp   Neurological:      General: No focal deficit present.      Mental Status: She is alert and oriented to person, place, and time.   Psychiatric:         Mood and Affect: Mood and affect normal.         Behavior: Behavior normal.               Labs:   Recent Results (from the past 48 hour(s))   CBC W/ AUTO DIFFERENTIAL    Collection Time: 03/20/23  8:35 AM   Result Value Ref Range    WBC 6.02 3.90 - 12.70 K/uL    RBC 4.26 4.00 - 5.40 M/uL    Hemoglobin 11.8 (L) 12.0 - 16.0 g/dL    Hematocrit 38.2 37.0 - 48.5 %    MCV 90 82 - 98 fL    MCH 27.7 27.0 - 31.0 pg    MCHC 30.9 (L) 32.0 - 36.0 g/dL    RDW 17.2 (H) 11.5 - 14.5 %    Platelets 235 150 - 450 K/uL    MPV 10.9 9.2 - 12.9 fL    Immature Granulocytes 1.0 (H) 0.0 - 0.5 %    Gran # (ANC) 4.3 1.8 - 7.7 K/uL    Immature Grans (Abs) 0.06 (H) 0.00 - 0.04 K/uL    Lymph # 1.1 1.0 - 4.8 K/uL    Mono # 0.5 0.3 - 1.0 K/uL    Eos # 0.0 0.0 - 0.5 K/uL    Baso # 0.06 0.00 - 0.20 K/uL    nRBC 0 0 /100 WBC    Gran % 70.7 38.0 - 73.0 %    Lymph % 17.8 (L) 18.0 - 48.0 %    Mono % 8.8 4.0 - 15.0 %    Eosinophil % 0.7 0.0 - 8.0 %    Basophil % 1.0 0.0 - 1.9 %    Differential Method Automated    CMP    Collection Time: 03/20/23  8:35 AM   Result Value Ref Range    Sodium 139 136 - 145 mmol/L    Potassium 4.8 3.5 - 5.1 mmol/L    Chloride 102 95 - 110 mmol/L    CO2 25 23 - 29 mmol/L    Glucose 168 (H) 70 - 110 mg/dL    BUN 16 8 - 23 mg/dL    Creatinine 1.1 0.5 - 1.4 mg/dL    Calcium 10.3 8.7 - 10.5 mg/dL    Total Protein 7.9 6.0 - 8.4 g/dL    Albumin 3.7 3.5 - 5.2 g/dL    Total Bilirubin 0.4 0.1 - 1.0 mg/dL    Alkaline Phosphatase 62 55 - 135 U/L    AST 30 10 - 40 U/L    ALT 26 10 - 44 U/L    Anion Gap 12 8 - 16 mmol/L    eGFR 55.8 (A) >60 mL/min/1.73 m^2   TSH    Collection Time: 03/20/23  8:35 AM   Result Value Ref Range    TSH 3.190 0.400 - 4.000 uIU/mL   FREE T4     Collection Time: 03/20/23  8:35 AM   Result Value Ref Range    Free T4 0.98 0.71 - 1.51 ng/dL            Imaging: Personally reviewed CT scans with patient showing new, progressing LLL lung nodule now 8 mm  CT Head W Wo Contrast  Narrative: EXAMINATION:  CT HEAD WITH AND WITHOUT    CLINICAL HISTORY:  Metastatic disease evaluation; Malignant neoplasm of unspecified part of right bronchus or lung    TECHNIQUE:  Low dose axial CT images obtained throughout the head before and after administration of 75 mL Omnipaque 350 intravenous contrast. Sagittal and coronal reconstructions were performed.    COMPARISON:  10/07/2020    FINDINGS:  Intracranial compartment:    No hydrocephalus.  No extra-axial blood or fluid collections.    There is reidentification of encephalomalacia involving the right parietal and posterior temporal lobes.  No abnormal enhancement..  No parenchymal mass, hemorrhage, edema or major vascular distribution infarct.    Skull/extracranial contents (limited evaluation): No fracture. Mastoid air cells and paranasal sinuses are essentially clear.  Impression: 1. No evidence for intracranial metastases.  2. Stable appearance of encephalomalacia involving the right parietal and posterior temporal lobes.    Electronically signed by: Maxim Kate MD  Date:    02/16/2023  Time:    13:33            Diagnoses:       1. NSCLC of right lung    2. Radiation pneumonitis    3. History of breast cancer in female    4. Dilated cardiomyopathy    5. Left bundle-branch block    6. Cardiac resynchronization therapy defibrillator (CRT-D) in place    7. Essential hypertension    8. Chemotherapy-induced nausea          Assessment and Plan:         1. Recurrent NSCLC  Plan is for definitive chemoRT, will need re-staging scans prior.  Reviewed with patient in detail her diagnosis, stage, treatment options, and prognosis (3 year OS 66% vs. 43.5% without durvalumab) with standard of care chemoRT followed by maintenance  immunotherapy.  Patient has consented for [id number] clinical trial, a randomized control trial evaluating combination chemoRT + durvalumab followed by maintenance vs. SOC chemoRT -> maintenance.  CT scans 7/2021 with CR.  - patient randomized on [id number] to SOC arm (Imfinzi) - completed 12/2021.    - CT scan 12/2022 with progressing pulmonary nodule in LLL, still with stable disease in RLL consolidation. PET scan also showing enlarging right lower lobe mass with increased hypermetabolic activity concerning for recurrence. Will present her case at the next thoracic tumor board to discuss biopsy and possible treatment options.   - Biopsy of lung mass consistent with SCC. Will proceed with 9LA. Scheduling head CT in 2 weeks after vacation. Can consider radiation after 2-3 cycles.  - Labs acceptable for C2D1. Re-staging scans prior to C3.     2. Grade 2 initially, now resolved. Continue COPD maintenance inhalers and she has duonebs prn.     3. 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      4-7.  Stable, follows with cardiology. AICD placed, but patient now has recovered EF and only takes lasix prn.  NYHA class I.    8. Zyprexa nights 1-4. Zofran and compazine PRN.     Patient is in agreement with the proposed treatment plan. All questions were answered to the patient's satisfaction. Pt knows to call clinic if anything is needed before the next clinic visit.    Aide Magaña, MSN, APRN, FNP-C  Hematology and Medical Oncology  Nurse Practitioner to Dr. Javi Avina  Nurse Practitioner, [facility]            Route Chart for Scheduling    Med Onc Chart Routing      Follow up with physician    Follow up with DANIEL 3 weeks. prior to Opdivo   Infusion scheduling note    Injection scheduling note    Labs CBC, CMP, free T4 and TSH   Scheduling: prior to infusion  Preferred lab:  Lab interval:     Imaging    Pharmacy appointment    Other referrals           Treatment Plan  Information   OP NSCLC NIVOLUMAB 360 MG D1 & D22 WITH IPILIMUMAB 1 MG/KG Q6W PLUS CARBOPLATIN (AUC) PACLITAXEL Q3W X2 DOSES   Javi Avina MD   Upcoming Treatment Dates - OP NSCLC NIVOLUMAB 360 MG D1 & D22 WITH IPILIMUMAB 1 MG/KG Q6W PLUS CARBOPLATIN (AUC) PACLITAXEL Q3W X2 DOSES    3/20/2023       Immunotherapy       nivolumab 360 mg in sodium chloride 0.9% 86 mL infusion       ipilimumab (YERVOY) 1 mg/kg = 91 mg in sodium chloride 0.9% 68.2 mL chemo infusion  4/10/2023       Immunotherapy       nivolumab 360 mg in sodium chloride 0.9% 86 mL infusion  5/1/2023       Immunotherapy       nivolumab 360 mg in sodium chloride 0.9% 86 mL infusion       ipilimumab (YERVOY) 1 mg/kg = 91 mg in sodium chloride 0.9% 68.2 mL chemo infusion  5/22/2023       Immunotherapy       nivolumab 360 mg in sodium chloride 0.9% 86 mL infusion

## 2023-03-23 ENCOUNTER — PATIENT MESSAGE (OUTPATIENT)
Dept: ADMINISTRATIVE | Facility: HOSPITAL | Age: 68
End: 2023-03-23
Payer: MEDICARE

## 2023-03-27 ENCOUNTER — PATIENT OUTREACH (OUTPATIENT)
Dept: ADMINISTRATIVE | Facility: HOSPITAL | Age: 68
End: 2023-03-27
Payer: MEDICARE

## 2023-03-27 ENCOUNTER — PATIENT MESSAGE (OUTPATIENT)
Dept: ADMINISTRATIVE | Facility: HOSPITAL | Age: 68
End: 2023-03-27
Payer: MEDICARE

## 2023-03-27 ENCOUNTER — TELEPHONE (OUTPATIENT)
Dept: FAMILY MEDICINE | Facility: CLINIC | Age: 68
End: 2023-03-27
Payer: MEDICARE

## 2023-03-27 VITALS — DIASTOLIC BLOOD PRESSURE: 68 MMHG | SYSTOLIC BLOOD PRESSURE: 142 MMHG

## 2023-03-27 NOTE — PROGRESS NOTES
Incoming blood pressure reading of 142/68 taken on 03//23/23 receive via the [employer] portal. BP recorded.    Overdue Diabetic Eye Exam - friendly reminder message sent via the [employer] portal.

## 2023-03-28 ENCOUNTER — PATIENT MESSAGE (OUTPATIENT)
Dept: FAMILY MEDICINE | Facility: CLINIC | Age: 68
End: 2023-03-28
Payer: MEDICARE

## 2023-03-28 ENCOUNTER — TELEPHONE (OUTPATIENT)
Dept: FAMILY MEDICINE | Facility: CLINIC | Age: 68
End: 2023-03-28
Payer: MEDICARE

## 2023-03-28 VITALS — SYSTOLIC BLOOD PRESSURE: 129 MMHG | DIASTOLIC BLOOD PRESSURE: 76 MMHG

## 2023-03-28 NOTE — TELEPHONE ENCOUNTER
Dr Chavez, I took the second amlodipine pill after your text yesterday. I woke up at 4 am this morning with a headache so I got up and took my morning pills including the extra amlodipine. At 6:40, my bp was 140/90. At 8:30 am my bp was 147/96. Anyway we can increase the metropolol and see if that works?        Will forward this message to  for his advice

## 2023-03-31 ENCOUNTER — PATIENT MESSAGE (OUTPATIENT)
Dept: ELECTROPHYSIOLOGY | Facility: CLINIC | Age: 68
End: 2023-03-31
Payer: MEDICARE

## 2023-03-31 ENCOUNTER — PATIENT MESSAGE (OUTPATIENT)
Dept: HEMATOLOGY/ONCOLOGY | Facility: CLINIC | Age: 68
End: 2023-03-31
Payer: MEDICARE

## 2023-04-10 ENCOUNTER — INFUSION (OUTPATIENT)
Dept: INFUSION THERAPY | Facility: HOSPITAL | Age: 68
End: 2023-04-10
Payer: MEDICARE

## 2023-04-10 ENCOUNTER — OFFICE VISIT (OUTPATIENT)
Dept: HEMATOLOGY/ONCOLOGY | Facility: CLINIC | Age: 68
End: 2023-04-10
Payer: MEDICARE

## 2023-04-10 ENCOUNTER — LAB VISIT (OUTPATIENT)
Dept: LAB | Facility: HOSPITAL | Age: 68
End: 2023-04-10
Attending: INTERNAL MEDICINE
Payer: MEDICARE

## 2023-04-10 VITALS
BODY MASS INDEX: 34.93 KG/M2 | WEIGHT: 189.81 LBS | RESPIRATION RATE: 16 BRPM | TEMPERATURE: 98 F | HEART RATE: 101 BPM | DIASTOLIC BLOOD PRESSURE: 71 MMHG | HEIGHT: 62 IN | SYSTOLIC BLOOD PRESSURE: 135 MMHG

## 2023-04-10 VITALS
HEIGHT: 62 IN | SYSTOLIC BLOOD PRESSURE: 118 MMHG | HEART RATE: 100 BPM | DIASTOLIC BLOOD PRESSURE: 65 MMHG | WEIGHT: 189.81 LBS | TEMPERATURE: 98 F | OXYGEN SATURATION: 95 % | RESPIRATION RATE: 16 BRPM | BODY MASS INDEX: 34.93 KG/M2

## 2023-04-10 DIAGNOSIS — E03.9 HYPOTHYROIDISM, UNSPECIFIED TYPE: ICD-10-CM

## 2023-04-10 DIAGNOSIS — Z85.3 HISTORY OF BREAST CANCER IN FEMALE: ICD-10-CM

## 2023-04-10 DIAGNOSIS — Z95.810 CARDIAC RESYNCHRONIZATION THERAPY DEFIBRILLATOR (CRT-D) IN PLACE: ICD-10-CM

## 2023-04-10 DIAGNOSIS — C34.91 NSCLC OF RIGHT LUNG: Primary | ICD-10-CM

## 2023-04-10 DIAGNOSIS — C34.91 NSCLC OF RIGHT LUNG: ICD-10-CM

## 2023-04-10 DIAGNOSIS — J70.0 RADIATION PNEUMONITIS: ICD-10-CM

## 2023-04-10 DIAGNOSIS — E11.9 TYPE 2 DIABETES MELLITUS WITHOUT COMPLICATION, WITHOUT LONG-TERM CURRENT USE OF INSULIN: ICD-10-CM

## 2023-04-10 DIAGNOSIS — R91.8 LUNG MASS: ICD-10-CM

## 2023-04-10 DIAGNOSIS — I10 ESSENTIAL HYPERTENSION: ICD-10-CM

## 2023-04-10 DIAGNOSIS — E66.01 CLASS 2 SEVERE OBESITY DUE TO EXCESS CALORIES WITH SERIOUS COMORBIDITY AND BODY MASS INDEX (BMI) OF 36.0 TO 36.9 IN ADULT: ICD-10-CM

## 2023-04-10 DIAGNOSIS — R11.0 CHEMOTHERAPY-INDUCED NAUSEA: ICD-10-CM

## 2023-04-10 DIAGNOSIS — I44.7 LEFT BUNDLE-BRANCH BLOCK: ICD-10-CM

## 2023-04-10 DIAGNOSIS — I42.0 DILATED CARDIOMYOPATHY: ICD-10-CM

## 2023-04-10 DIAGNOSIS — T45.1X5A CHEMOTHERAPY-INDUCED NAUSEA: ICD-10-CM

## 2023-04-10 LAB
ALBUMIN SERPL BCP-MCNC: 3.6 G/DL (ref 3.5–5.2)
ALP SERPL-CCNC: 56 U/L (ref 55–135)
ALT SERPL W/O P-5'-P-CCNC: 19 U/L (ref 10–44)
ANION GAP SERPL CALC-SCNC: 11 MMOL/L (ref 8–16)
AST SERPL-CCNC: 23 U/L (ref 10–40)
BASOPHILS # BLD AUTO: 0.05 K/UL (ref 0–0.2)
BASOPHILS NFR BLD: 0.9 % (ref 0–1.9)
BILIRUB SERPL-MCNC: 0.3 MG/DL (ref 0.1–1)
BUN SERPL-MCNC: 18 MG/DL (ref 8–23)
CALCIUM SERPL-MCNC: 10.4 MG/DL (ref 8.7–10.5)
CHLORIDE SERPL-SCNC: 106 MMOL/L (ref 95–110)
CO2 SERPL-SCNC: 24 MMOL/L (ref 23–29)
CREAT SERPL-MCNC: 0.9 MG/DL (ref 0.5–1.4)
DIFFERENTIAL METHOD: ABNORMAL
EOSINOPHIL # BLD AUTO: 0.1 K/UL (ref 0–0.5)
EOSINOPHIL NFR BLD: 1.5 % (ref 0–8)
ERYTHROCYTE [DISTWIDTH] IN BLOOD BY AUTOMATED COUNT: 16.7 % (ref 11.5–14.5)
EST. GFR  (NO RACE VARIABLE): >60 ML/MIN/1.73 M^2
GLUCOSE SERPL-MCNC: 125 MG/DL (ref 70–110)
HCT VFR BLD AUTO: 36.7 % (ref 37–48.5)
HGB BLD-MCNC: 11.3 G/DL (ref 12–16)
IMM GRANULOCYTES # BLD AUTO: 0.06 K/UL (ref 0–0.04)
IMM GRANULOCYTES NFR BLD AUTO: 1 % (ref 0–0.5)
LYMPHOCYTES # BLD AUTO: 1.1 K/UL (ref 1–4.8)
LYMPHOCYTES NFR BLD: 18.8 % (ref 18–48)
MCH RBC QN AUTO: 27.8 PG (ref 27–31)
MCHC RBC AUTO-ENTMCNC: 30.8 G/DL (ref 32–36)
MCV RBC AUTO: 90 FL (ref 82–98)
MONOCYTES # BLD AUTO: 0.6 K/UL (ref 0.3–1)
MONOCYTES NFR BLD: 9.4 % (ref 4–15)
NEUTROPHILS # BLD AUTO: 4 K/UL (ref 1.8–7.7)
NEUTROPHILS NFR BLD: 68.4 % (ref 38–73)
NRBC BLD-RTO: 0 /100 WBC
PLATELET # BLD AUTO: 253 K/UL (ref 150–450)
PMV BLD AUTO: 11.1 FL (ref 9.2–12.9)
POTASSIUM SERPL-SCNC: 4.6 MMOL/L (ref 3.5–5.1)
PROT SERPL-MCNC: 7.6 G/DL (ref 6–8.4)
RBC # BLD AUTO: 4.06 M/UL (ref 4–5.4)
SODIUM SERPL-SCNC: 141 MMOL/L (ref 136–145)
T4 FREE SERPL-MCNC: 0.99 NG/DL (ref 0.71–1.51)
TSH SERPL DL<=0.005 MIU/L-ACNC: 3.45 UIU/ML (ref 0.4–4)
WBC # BLD AUTO: 5.85 K/UL (ref 3.9–12.7)

## 2023-04-10 PROCEDURE — 1159F MED LIST DOCD IN RCRD: CPT | Mod: HCNC,CPTII,S$GLB, | Performed by: NURSE PRACTITIONER

## 2023-04-10 PROCEDURE — 3052F PR MOST RECENT HEMOGLOBIN A1C LEVEL 8.0 - < 9.0%: ICD-10-PCS | Mod: HCNC,CPTII,S$GLB, | Performed by: NURSE PRACTITIONER

## 2023-04-10 PROCEDURE — 1160F PR REVIEW ALL MEDS BY PRESCRIBER/CLIN PHARMACIST DOCUMENTED: ICD-10-PCS | Mod: HCNC,CPTII,S$GLB, | Performed by: NURSE PRACTITIONER

## 2023-04-10 PROCEDURE — 85025 COMPLETE CBC W/AUTO DIFF WBC: CPT | Mod: HCNC | Performed by: INTERNAL MEDICINE

## 2023-04-10 PROCEDURE — 99999 PR PBB SHADOW E&M-EST. PATIENT-LVL V: CPT | Mod: PBBFAC,HCNC,, | Performed by: NURSE PRACTITIONER

## 2023-04-10 PROCEDURE — 4010F ACE/ARB THERAPY RXD/TAKEN: CPT | Mod: HCNC,CPTII,S$GLB, | Performed by: NURSE PRACTITIONER

## 2023-04-10 PROCEDURE — 36415 COLL VENOUS BLD VENIPUNCTURE: CPT | Mod: HCNC | Performed by: INTERNAL MEDICINE

## 2023-04-10 PROCEDURE — 1157F PR ADVANCE CARE PLAN OR EQUIV PRESENT IN MEDICAL RECORD: ICD-10-PCS | Mod: HCNC,CPTII,S$GLB, | Performed by: NURSE PRACTITIONER

## 2023-04-10 PROCEDURE — 25000003 PHARM REV CODE 250: Mod: HCNC | Performed by: NURSE PRACTITIONER

## 2023-04-10 PROCEDURE — 3078F DIAST BP <80 MM HG: CPT | Mod: HCNC,CPTII,S$GLB, | Performed by: NURSE PRACTITIONER

## 2023-04-10 PROCEDURE — 99215 PR OFFICE/OUTPT VISIT, EST, LEVL V, 40-54 MIN: ICD-10-PCS | Mod: HCNC,S$GLB,, | Performed by: NURSE PRACTITIONER

## 2023-04-10 PROCEDURE — 1101F PR PT FALLS ASSESS DOC 0-1 FALLS W/OUT INJ PAST YR: ICD-10-PCS | Mod: HCNC,CPTII,S$GLB, | Performed by: NURSE PRACTITIONER

## 2023-04-10 PROCEDURE — 1159F PR MEDICATION LIST DOCUMENTED IN MEDICAL RECORD: ICD-10-PCS | Mod: HCNC,CPTII,S$GLB, | Performed by: NURSE PRACTITIONER

## 2023-04-10 PROCEDURE — 84439 ASSAY OF FREE THYROXINE: CPT | Mod: HCNC | Performed by: INTERNAL MEDICINE

## 2023-04-10 PROCEDURE — 3074F SYST BP LT 130 MM HG: CPT | Mod: HCNC,CPTII,S$GLB, | Performed by: NURSE PRACTITIONER

## 2023-04-10 PROCEDURE — 4010F PR ACE/ARB THEARPY RXD/TAKEN: ICD-10-PCS | Mod: HCNC,CPTII,S$GLB, | Performed by: NURSE PRACTITIONER

## 2023-04-10 PROCEDURE — 99999 PR PBB SHADOW E&M-EST. PATIENT-LVL V: ICD-10-PCS | Mod: PBBFAC,HCNC,, | Performed by: NURSE PRACTITIONER

## 2023-04-10 PROCEDURE — 99215 OFFICE O/P EST HI 40 MIN: CPT | Mod: HCNC,S$GLB,, | Performed by: NURSE PRACTITIONER

## 2023-04-10 PROCEDURE — 63600175 PHARM REV CODE 636 W HCPCS: Mod: JZ,JG,HCNC | Performed by: NURSE PRACTITIONER

## 2023-04-10 PROCEDURE — 1126F AMNT PAIN NOTED NONE PRSNT: CPT | Mod: HCNC,CPTII,S$GLB, | Performed by: NURSE PRACTITIONER

## 2023-04-10 PROCEDURE — 96413 CHEMO IV INFUSION 1 HR: CPT | Mod: HCNC

## 2023-04-10 PROCEDURE — 3008F PR BODY MASS INDEX (BMI) DOCUMENTED: ICD-10-PCS | Mod: HCNC,CPTII,S$GLB, | Performed by: NURSE PRACTITIONER

## 2023-04-10 PROCEDURE — 3074F PR MOST RECENT SYSTOLIC BLOOD PRESSURE < 130 MM HG: ICD-10-PCS | Mod: HCNC,CPTII,S$GLB, | Performed by: NURSE PRACTITIONER

## 2023-04-10 PROCEDURE — 3288F FALL RISK ASSESSMENT DOCD: CPT | Mod: HCNC,CPTII,S$GLB, | Performed by: NURSE PRACTITIONER

## 2023-04-10 PROCEDURE — 80053 COMPREHEN METABOLIC PANEL: CPT | Mod: HCNC | Performed by: INTERNAL MEDICINE

## 2023-04-10 PROCEDURE — 3008F BODY MASS INDEX DOCD: CPT | Mod: HCNC,CPTII,S$GLB, | Performed by: NURSE PRACTITIONER

## 2023-04-10 PROCEDURE — 3052F HG A1C>EQUAL 8.0%<EQUAL 9.0%: CPT | Mod: HCNC,CPTII,S$GLB, | Performed by: NURSE PRACTITIONER

## 2023-04-10 PROCEDURE — 1101F PT FALLS ASSESS-DOCD LE1/YR: CPT | Mod: HCNC,CPTII,S$GLB, | Performed by: NURSE PRACTITIONER

## 2023-04-10 PROCEDURE — 3288F PR FALLS RISK ASSESSMENT DOCUMENTED: ICD-10-PCS | Mod: HCNC,CPTII,S$GLB, | Performed by: NURSE PRACTITIONER

## 2023-04-10 PROCEDURE — 3078F PR MOST RECENT DIASTOLIC BLOOD PRESSURE < 80 MM HG: ICD-10-PCS | Mod: HCNC,CPTII,S$GLB, | Performed by: NURSE PRACTITIONER

## 2023-04-10 PROCEDURE — 84443 ASSAY THYROID STIM HORMONE: CPT | Mod: HCNC | Performed by: INTERNAL MEDICINE

## 2023-04-10 PROCEDURE — 1157F ADVNC CARE PLAN IN RCRD: CPT | Mod: HCNC,CPTII,S$GLB, | Performed by: NURSE PRACTITIONER

## 2023-04-10 PROCEDURE — 1126F PR PAIN SEVERITY QUANTIFIED, NO PAIN PRESENT: ICD-10-PCS | Mod: HCNC,CPTII,S$GLB, | Performed by: NURSE PRACTITIONER

## 2023-04-10 PROCEDURE — 1160F RVW MEDS BY RX/DR IN RCRD: CPT | Mod: HCNC,CPTII,S$GLB, | Performed by: NURSE PRACTITIONER

## 2023-04-10 RX ORDER — SODIUM CHLORIDE 0.9 % (FLUSH) 0.9 %
10 SYRINGE (ML) INJECTION
Status: DISCONTINUED | OUTPATIENT
Start: 2023-04-10 | End: 2023-04-10 | Stop reason: HOSPADM

## 2023-04-10 RX ORDER — DIPHENHYDRAMINE HYDROCHLORIDE 50 MG/ML
50 INJECTION INTRAMUSCULAR; INTRAVENOUS ONCE AS NEEDED
Status: DISCONTINUED | OUTPATIENT
Start: 2023-04-10 | End: 2023-04-10 | Stop reason: HOSPADM

## 2023-04-10 RX ORDER — EPINEPHRINE 0.3 MG/.3ML
0.3 INJECTION SUBCUTANEOUS ONCE AS NEEDED
Status: CANCELLED | OUTPATIENT
Start: 2023-04-10

## 2023-04-10 RX ORDER — SODIUM CHLORIDE 0.9 % (FLUSH) 0.9 %
10 SYRINGE (ML) INJECTION
Status: CANCELLED | OUTPATIENT
Start: 2023-04-10

## 2023-04-10 RX ORDER — EPINEPHRINE 0.3 MG/.3ML
0.3 INJECTION SUBCUTANEOUS ONCE AS NEEDED
Status: DISCONTINUED | OUTPATIENT
Start: 2023-04-10 | End: 2023-04-10 | Stop reason: HOSPADM

## 2023-04-10 RX ORDER — HEPARIN 100 UNIT/ML
500 SYRINGE INTRAVENOUS
Status: CANCELLED | OUTPATIENT
Start: 2023-04-10

## 2023-04-10 RX ORDER — DIPHENHYDRAMINE HYDROCHLORIDE 50 MG/ML
50 INJECTION INTRAMUSCULAR; INTRAVENOUS ONCE AS NEEDED
Status: CANCELLED | OUTPATIENT
Start: 2023-04-10

## 2023-04-10 RX ORDER — HEPARIN 100 UNIT/ML
500 SYRINGE INTRAVENOUS
Status: DISCONTINUED | OUTPATIENT
Start: 2023-04-10 | End: 2023-04-10 | Stop reason: HOSPADM

## 2023-04-10 RX ADMIN — SODIUM CHLORIDE 360 MG: 9 INJECTION, SOLUTION INTRAVENOUS at 10:04

## 2023-04-10 RX ADMIN — SODIUM CHLORIDE: 9 INJECTION, SOLUTION INTRAVENOUS at 09:04

## 2023-04-10 NOTE — PLAN OF CARE
Problem: Adult Inpatient Plan of Care  Goal: Plan of Care Review  Outcome: Ongoing, Progressing   Patient tolerated infusion well today. NAD noted. Discharged home and ambulates independently

## 2023-04-10 NOTE — PROGRESS NOTES
ONCOLOGY FOLLOW UP VISIT.       Cancer/Stage/TNM:    Cancer Staging   NSCLC of right lung  Staging form: Lung, AJCC 8th Edition  - Clinical stage from 9/29/2020: Stage IIIA (cT3, cN1, cM0) - Signed by Ramo Tucker MD on 10/24/2020         Oncology History   NSCLC of right lung   9/29/2020 Cancer Staged    Staging form: Lung, AJCC 8th Edition  - Clinical stage from 9/29/2020: Stage IIIA (cT3, cN1, cM0)       10/14/2020 Initial Diagnosis    NSCLC of right lung       11/17/2020 - 12/30/2020 Radiation Therapy    Treating physician: Harvinder Lara     Site  Technique  Energy  Dose/Fx (Gy)  #Fx  Total Dose (Gy)    RLL Lung  VMAT  6X  2  30 / 30  60       2/6/2023 -  Chemotherapy    Treatment Summary   Plan Name: OP NSCLC NIVOLUMAB 360 MG D1 & D22 WITH IPILIMUMAB 1 MG/KG Q6W PLUS CARBOPLATIN (AUC) PACLITAXEL Q3W X2 DOSES  Treatment Goal: Control  Status: Active  Start Date: 2/6/2023  End Date: 12/30/2024 (Planned)  Provider: Javi Avina MD  Chemotherapy: CARBOplatin (PARAPLATIN) 525 mg in sodium chloride 0.9% 337.5 mL chemo infusion, 525 mg, Intravenous, Clinic/HOD 1 time, 1 of 1 cycle  Administration: 525 mg (2/6/2023), 490 mg (2/27/2023)  PACLitaxeL (TAXOL) 200 mg/m2 = 396 mg in sodium chloride 0.9% 500 mL chemo infusion, 200 mg/m2 = 396 mg (100 % of original dose 200 mg/m2), Intravenous, Clinic/HOD 1 time, 1 of 1 cycle  Dose modification: 200 mg/m2 (original dose 200 mg/m2, Cycle 1), 200 mg/m2 (original dose 200 mg/m2, Cycle 1)  Administration: 396 mg (2/6/2023), 396 mg (2/27/2023)            Interval History:   Today, patient reports feeling well overall. Denies any SOB or difficulty with breathing. Diarrhea has resolved. Weight loss since starting Ozempic. Doing chair yoga at home. Feels energy is better.     ROS:  Review of Systems   Constitutional:  Negative for chills, fever and weight loss.   HENT:  Negative for hearing loss, nosebleeds and sore throat.    Eyes:  Negative for blurred vision and  double vision.   Respiratory:  Negative for cough, hemoptysis and shortness of breath.    Cardiovascular:  Negative for chest pain and leg swelling.   Gastrointestinal:  Positive for diarrhea (occassional (d/t gallbladder removal)). Negative for abdominal pain, blood in stool, heartburn, nausea and vomiting.   Genitourinary:  Negative for dysuria, frequency and hematuria.   Musculoskeletal:  Negative for back pain, joint pain and myalgias.   Skin:  Negative for itching and rash.   Neurological:  Negative for dizziness, tingling, sensory change, speech change and headaches.   Endo/Heme/Allergies:  Does not bruise/bleed easily.   Psychiatric/Behavioral:  The patient is not nervous/anxious.         A complete 12-point review of systems was reviewed and is negative except as mentioned above.     Past Medical History:   Past Medical History:   Diagnosis Date    AICD (automatic cardioverter/defibrillator) present     Asthma     bronchitis in past    Breast cancer 2016    right    Cardiac pacemaker     Cardiomyopathy     COPD (chronic obstructive pulmonary disease)     Diabetes mellitus, type 2     Hyperglycemia     Hyperlipidemia     Hypertension     Malignant neoplasm of overlapping sites of female breast 2/12/2016    Nuclear sclerosis of both eyes 8/12/2020    Respiratory distress 3/12/2020        Allergies:   Review of patient's allergies indicates:   Allergen Reactions    Taxol [paclitaxel]      Hypersensitivity reaction to taxol, symptoms included shortness of breath, nausea, dizziness, flushing     Carboplatin Other (See Comments)     Itching and hives    Adhesive Rash     tegaderm burns and blistered skin        Medications:   Current Outpatient Medications   Medication Sig Dispense Refill    ACCU-CHEK ALFRED PLUS TEST STRP Strp USE TO TEST THREE TIMES DAILY 200 strip 3    albuterol sulfate (PROAIR RESPICLICK) 90 mcg/actuation AePB Inhale 2 puffs into the lungs every 4 (four) hours as needed. Rescue 1 each 5     amLODIPine (NORVASC) 5 MG tablet TAKE 1 TABLET BY MOUTH EVERY DAY (Patient taking differently: Take 5 mg by mouth 2 (two) times daily.) 90 tablet 3    atorvastatin (LIPITOR) 10 MG tablet Take 1 tablet (10 mg total) by mouth once daily. 90 tablet 3    buPROPion (WELLBUTRIN XL) 150 MG TB24 tablet Take 1 tablet (150 mg total) by mouth once daily. 90 tablet 0    cetirizine (ZYRTEC) 10 MG tablet Take 10 mg by mouth every evening.       fluticasone-salmeterol diskus inhaler 250-50 mcg Inhale 1 puff into the lungs 2 (two) times daily. Controller 180 each 0    losartan (COZAAR) 100 MG tablet Take 1 tablet (100 mg total) by mouth once daily. 90 tablet 0    metFORMIN (GLUCOPHAGE) 500 MG tablet Take 2 tablets (1,000 mg total) by mouth 2 (two) times daily with meals. 360 tablet 3    metoprolol succinate (TOPROL-XL) 100 MG 24 hr tablet Take 1 tablet (100 mg total) by mouth once daily. 90 tablet 3    multivitamin (THERAGRAN) per tablet Take 1 tablet by mouth once daily.      mupirocin (BACTROBAN) 2 % ointment Apply topically 3 (three) times daily. 30 g 1    OLANZapine (ZYPREXA) 5 MG tablet Take 1 tablet (5 mg total) by mouth every evening. Take 1 tablet by mouth every evening on days 1-4 of each chemotherapy cycle 30 tablet 1    ondansetron (ZOFRAN-ODT) 8 MG TbDL Take 1 tablet (8 mg total) by mouth every 8 (eight) hours as needed (nausea/vomiting). Take 1 tablet (8 mg) by mouth every 8 hours as needed for nausea/vomiting. 60 tablet 5    pantoprazole (PROTONIX) 40 MG tablet Take 1 tablet (40 mg total) by mouth once daily. 90 tablet 3    prochlorperazine (COMPAZINE) 10 MG tablet Take 1 tablet (10 mg total) by mouth every 6 (six) hours as needed (nausea/vomiting - second choice). 30 tablet 1    semaglutide (OZEMPIC) 0.25 mg or 0.5 mg(2 mg/1.5 mL) pen injector Take 0.25 mg for 4 weeks, then increase to 0.5 mg weekly 4 pen 3    sertraline (ZOLOFT) 100 MG tablet TAKE 1 TABLET BY MOUTH EVERY EVENING. 90 tablet 3    tiotropium (SPIRIVA  "WITH HANDIHALER) 18 mcg inhalation capsule INHALE 1 CAPSULE BY MOUTH EVERY DAY. 90 capsule 3    triamcinolone acetonide 0.1% (KENALOG) 0.1 % cream Apply topically 2 (two) times daily. 453.6 g 2     No current facility-administered medications for this visit.     Facility-Administered Medications Ordered in Other Visits   Medication Dose Route Frequency Provider Last Rate Last Admin    fentaNYL injection 25 mcg  25 mcg Intravenous Q5 Min PRN Keesha Martins MD        haloperidol lactate injection 0.5 mg  0.5 mg Intravenous Once PRN Keesha Martins MD        HYDROmorphone injection 0.2 mg  0.2 mg Intravenous Q5 Min PRN Keesha Martins MD        ondansetron injection 4 mg  4 mg Intravenous Once PRN Keesha Martins MD        sodium chloride 0.9% flush 10 mL  10 mL Intravenous PRN Keesha Martins MD            Physical Exam:   /65 (BP Location: Left arm, Patient Position: Sitting, BP Method: Medium (Automatic))   Pulse 100   Temp 98.4 °F (36.9 °C) (Oral)   Resp 16   Ht 5' 2" (1.575 m)   Wt 86.1 kg (189 lb 13.1 oz)   LMP  (LMP Unknown)   SpO2 95%   BMI 34.72 kg/m²      ECOG Performance Status: (foot note - ECOG PS provided by Eastern Cooperative Oncology Group) 0 - Asymptomatic    Physical Exam  Vitals and nursing note reviewed.   Constitutional:       Appearance: Normal appearance. She is well-developed and normal weight.   HENT:      Head: Normocephalic and atraumatic.   Eyes:      Conjunctiva/sclera: Conjunctivae normal.      Pupils: Pupils are equal, round, and reactive to light.   Pulmonary:      Effort: Pulmonary effort is normal. No respiratory distress.   Abdominal:      General: There is no distension.      Palpations: Abdomen is soft.   Musculoskeletal:         General: No swelling. Normal range of motion.      Cervical back: Normal range of motion and neck supple.      Left ankle: Swelling (trace) present.   Lymphadenopathy:      Cervical: No cervical adenopathy.   Skin:     General: Skin is warm and dry.      Findings: " Rash (scalp) present.      Comments: Honey crusted lesions to scalp   Neurological:      General: No focal deficit present.      Mental Status: She is alert and oriented to person, place, and time.   Psychiatric:         Mood and Affect: Mood and affect normal.         Behavior: Behavior normal.               Labs:   Recent Results (from the past 48 hour(s))   CBC W/ AUTO DIFFERENTIAL    Collection Time: 04/10/23  7:43 AM   Result Value Ref Range    WBC 5.85 3.90 - 12.70 K/uL    RBC 4.06 4.00 - 5.40 M/uL    Hemoglobin 11.3 (L) 12.0 - 16.0 g/dL    Hematocrit 36.7 (L) 37.0 - 48.5 %    MCV 90 82 - 98 fL    MCH 27.8 27.0 - 31.0 pg    MCHC 30.8 (L) 32.0 - 36.0 g/dL    RDW 16.7 (H) 11.5 - 14.5 %    Platelets 253 150 - 450 K/uL    MPV 11.1 9.2 - 12.9 fL    Immature Granulocytes 1.0 (H) 0.0 - 0.5 %    Gran # (ANC) 4.0 1.8 - 7.7 K/uL    Immature Grans (Abs) 0.06 (H) 0.00 - 0.04 K/uL    Lymph # 1.1 1.0 - 4.8 K/uL    Mono # 0.6 0.3 - 1.0 K/uL    Eos # 0.1 0.0 - 0.5 K/uL    Baso # 0.05 0.00 - 0.20 K/uL    nRBC 0 0 /100 WBC    Gran % 68.4 38.0 - 73.0 %    Lymph % 18.8 18.0 - 48.0 %    Mono % 9.4 4.0 - 15.0 %    Eosinophil % 1.5 0.0 - 8.0 %    Basophil % 0.9 0.0 - 1.9 %    Differential Method Automated    CMP    Collection Time: 04/10/23  7:43 AM   Result Value Ref Range    Sodium 141 136 - 145 mmol/L    Potassium 4.6 3.5 - 5.1 mmol/L    Chloride 106 95 - 110 mmol/L    CO2 24 23 - 29 mmol/L    Glucose 125 (H) 70 - 110 mg/dL    BUN 18 8 - 23 mg/dL    Creatinine 0.9 0.5 - 1.4 mg/dL    Calcium 10.4 8.7 - 10.5 mg/dL    Total Protein 7.6 6.0 - 8.4 g/dL    Albumin 3.6 3.5 - 5.2 g/dL    Total Bilirubin 0.3 0.1 - 1.0 mg/dL    Alkaline Phosphatase 56 55 - 135 U/L    AST 23 10 - 40 U/L    ALT 19 10 - 44 U/L    Anion Gap 11 8 - 16 mmol/L    eGFR >60.0 >60 mL/min/1.73 m^2            Imaging: Personally reviewed CT scans with patient showing new, progressing LLL lung nodule now 8 mm  CT Head W Wo Contrast  Narrative: EXAMINATION:  CT HEAD  WITH AND WITHOUT    CLINICAL HISTORY:  Metastatic disease evaluation; Malignant neoplasm of unspecified part of right bronchus or lung    TECHNIQUE:  Low dose axial CT images obtained throughout the head before and after administration of 75 mL Omnipaque 350 intravenous contrast. Sagittal and coronal reconstructions were performed.    COMPARISON:  10/07/2020    FINDINGS:  Intracranial compartment:    No hydrocephalus.  No extra-axial blood or fluid collections.    There is reidentification of encephalomalacia involving the right parietal and posterior temporal lobes.  No abnormal enhancement..  No parenchymal mass, hemorrhage, edema or major vascular distribution infarct.    Skull/extracranial contents (limited evaluation): No fracture. Mastoid air cells and paranasal sinuses are essentially clear.  Impression: 1. No evidence for intracranial metastases.  2. Stable appearance of encephalomalacia involving the right parietal and posterior temporal lobes.    Electronically signed by: Maxim Kate MD  Date:    02/16/2023  Time:    13:33            Diagnoses:       1. NSCLC of right lung    2. Radiation pneumonitis    3. History of breast cancer in female    4. Dilated cardiomyopathy    5. Left bundle-branch block    6. Cardiac resynchronization therapy defibrillator (CRT-D) in place    7. Essential hypertension    8. Chemotherapy-induced nausea        Assessment and Plan:         1. Recurrent NSCLC  Plan is for definitive chemoRT, will need re-staging scans prior.  Reviewed with patient in detail her diagnosis, stage, treatment options, and prognosis (3 year OS 66% vs. 43.5% without durvalumab) with standard of care chemoRT followed by maintenance immunotherapy.  Patient has consented for [id number] clinical trial, a randomized control trial evaluating combination chemoRT + durvalumab followed by maintenance vs. SOC chemoRT -> maintenance.  CT scans 7/2021 with CR.  - patient randomized on [id number] to SOC arm (Imfinzi) -  completed 12/2021.    - CT scan 12/2022 with progressing pulmonary nodule in LLL, still with stable disease in RLL consolidation. PET scan also showing enlarging right lower lobe mass with increased hypermetabolic activity concerning for recurrence. Will present her case at the next thoracic tumor board to discuss biopsy and possible treatment options.   - Biopsy of lung mass consistent with SCC. Will proceed with 9LA. Scheduling head CT in 2 weeks after vacation. Can consider radiation after 2-3 cycles.  - Labs acceptable for C2D22. Re-staging scans prior to C3.     2. Grade 2 initially, now resolved. Continue COPD maintenance inhalers and she has duonebs prn.     3. 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      4-7.  Stable, follows with cardiology. AICD placed, but patient now has recovered EF and only takes lasix prn.  NYHA class I.    8. Zyprexa nights 1-4. Zofran and compazine PRN.     Patient is in agreement with the proposed treatment plan. All questions were answered to the patient's satisfaction. Pt knows to call clinic if anything is needed before the next clinic visit.    Aide Magaña, MSN, APRN, FNP-C  Hematology and Medical Oncology  Nurse Practitioner to Dr. Javi Avina  Nurse Practitioner, [facility]            Route Chart for Scheduling    Med Onc Chart Routing      Follow up with physician 3 weeks. prior to Opdivo+Yervoy   Follow up with DANIEL    Infusion scheduling note    Injection scheduling note    Labs CBC, CMP, free T4 and TSH   Scheduling: prior to infusion  Preferred lab:  Lab interval:     Imaging CT chest abdomen pelvis   in 3 weeks   Pharmacy appointment    Other referrals           Treatment Plan Information   OP NSCLC NIVOLUMAB 360 MG D1 & D22 WITH IPILIMUMAB 1 MG/KG Q6W PLUS CARBOPLATIN (AUC) PACLITAXEL Q3W X2 DOSES   Javi Avina MD   Upcoming Treatment Dates - OP NSCLC NIVOLUMAB 360 MG D1 & D22 WITH IPILIMUMAB 1 MG/KG  Q6W PLUS CARBOPLATIN (AUC) PACLITAXEL Q3W X2 DOSES    4/10/2023       Immunotherapy       nivolumab 360 mg in sodium chloride 0.9% 86 mL infusion  5/1/2023       Immunotherapy       nivolumab 360 mg in sodium chloride 0.9% 86 mL infusion       ipilimumab (YERVOY) 1 mg/kg = 91 mg in sodium chloride 0.9% 68.2 mL chemo infusion  5/22/2023       Immunotherapy       nivolumab 360 mg in sodium chloride 0.9% 86 mL infusion  6/12/2023       Immunotherapy       nivolumab 360 mg in sodium chloride 0.9% 86 mL infusion       ipilimumab (YERVOY) 1 mg/kg = 91 mg in sodium chloride 0.9% 68.2 mL chemo infusion

## 2023-04-24 ENCOUNTER — RESEARCH ENCOUNTER (OUTPATIENT)
Dept: RESEARCH | Facility: HOSPITAL | Age: 68
End: 2023-04-24
Payer: MEDICARE

## 2023-04-24 NOTE — PROGRESS NOTES
Protocol: Randomized Phase III Trial of [id number] (Durvalumab) as Concurrent and Consolidative Therapy or Consolidative Therapy Alone for Unresectable Stage 3 NSCLC   Protocol # DC9129          IRB # [id number]  PI: Carmen Akhtar MD  Treating Physician: Javi Avina MD  Study ID #: [id number]  Patient Initials: L.M.  Assigned ARM: ARM C/ Consolidative Durvalumab     April 24, 2023      Recurrence Tissue Block Submission     Patient agreed to optional specimens at time of consent and reaffirmed her consent to participate in this portion of the trial. Recurrence tissue was sent for [employer] testing per institutional protocol. Pathology notified us that the tissue was back on site as of last week. Tissue logged in STS and submitted per protocol to [facility]. Fed Ex Tracking # [id number].

## 2023-04-25 DIAGNOSIS — F32.9 REACTIVE DEPRESSION: ICD-10-CM

## 2023-04-25 DIAGNOSIS — I10 BENIGN ESSENTIAL HTN: ICD-10-CM

## 2023-04-25 RX ORDER — BUPROPION HYDROCHLORIDE 150 MG/1
150 TABLET ORAL DAILY
Qty: 90 TABLET | Refills: 0 | Status: SHIPPED | OUTPATIENT
Start: 2023-04-25 | End: 2023-08-23

## 2023-04-25 RX ORDER — LOSARTAN POTASSIUM 100 MG/1
100 TABLET ORAL DAILY
Qty: 90 TABLET | Refills: 2 | Status: SHIPPED | OUTPATIENT
Start: 2023-04-25 | End: 2024-01-03

## 2023-04-25 NOTE — TELEPHONE ENCOUNTER
No new care gaps identified.  [facility] Embedded Care Gaps. Reference number: [id number]. 4/25/2023   2:05:59 PM CDT

## 2023-04-25 NOTE — TELEPHONE ENCOUNTER
Refill Decision Note   Hannah Montoya  is requesting a refill authorization.  Brief Assessment and Rationale for Refill:  Approve     Medication Therapy Plan:       Medication Reconciliation Completed: No    Comments:     No Care Gaps recommended.     Note composed:6:20 PM 04/25/2023

## 2023-04-27 ENCOUNTER — HOSPITAL ENCOUNTER (OUTPATIENT)
Dept: RADIOLOGY | Facility: HOSPITAL | Age: 68
Discharge: HOME | End: 2023-04-27
Attending: NURSE PRACTITIONER
Payer: MEDICARE

## 2023-04-27 DIAGNOSIS — C34.91 NSCLC OF RIGHT LUNG: ICD-10-CM

## 2023-04-27 PROCEDURE — 71260 CT THORAX DX C+: CPT | Mod: TC,HCNC

## 2023-04-27 PROCEDURE — 74177 CT ABD & PELVIS W/CONTRAST: CPT | Mod: 26,HCNC,, | Performed by: INTERNAL MEDICINE

## 2023-04-27 PROCEDURE — 74177 CT CHEST ABDOMEN PELVIS WITH CONTRAST (XPD): ICD-10-PCS | Mod: 26,HCNC,, | Performed by: INTERNAL MEDICINE

## 2023-04-27 PROCEDURE — 71260 CT CHEST ABDOMEN PELVIS WITH CONTRAST (XPD): ICD-10-PCS | Mod: 26,HCNC,, | Performed by: INTERNAL MEDICINE

## 2023-04-27 PROCEDURE — 25500020 PHARM REV CODE 255: Mod: HCNC | Performed by: NURSE PRACTITIONER

## 2023-04-27 PROCEDURE — 74177 CT ABD & PELVIS W/CONTRAST: CPT | Mod: TC,HCNC

## 2023-04-27 PROCEDURE — 71260 CT THORAX DX C+: CPT | Mod: 26,HCNC,, | Performed by: INTERNAL MEDICINE

## 2023-04-27 RX ADMIN — IOHEXOL 85 ML: 350 INJECTION, SOLUTION INTRAVENOUS at 09:04

## 2023-05-01 ENCOUNTER — TELEPHONE (OUTPATIENT)
Dept: PALLIATIVE MEDICINE | Facility: CLINIC | Age: 68
End: 2023-05-01
Payer: MEDICARE

## 2023-05-01 ENCOUNTER — OFFICE VISIT (OUTPATIENT)
Dept: HEMATOLOGY/ONCOLOGY | Facility: CLINIC | Age: 68
End: 2023-05-01
Payer: MEDICARE

## 2023-05-01 ENCOUNTER — LAB VISIT (OUTPATIENT)
Dept: LAB | Facility: HOSPITAL | Age: 68
End: 2023-05-01
Attending: INTERNAL MEDICINE
Payer: MEDICARE

## 2023-05-01 ENCOUNTER — HOSPITAL ENCOUNTER (OUTPATIENT)
Dept: RADIATION THERAPY | Facility: HOSPITAL | Age: 68
Discharge: HOME | End: 2023-05-01
Attending: RADIOLOGY
Payer: MEDICARE

## 2023-05-01 ENCOUNTER — INFUSION (OUTPATIENT)
Dept: INFUSION THERAPY | Facility: HOSPITAL | Age: 68
End: 2023-05-01
Payer: MEDICARE

## 2023-05-01 VITALS
SYSTOLIC BLOOD PRESSURE: 121 MMHG | HEART RATE: 95 BPM | BODY MASS INDEX: 34.48 KG/M2 | WEIGHT: 187.38 LBS | TEMPERATURE: 99 F | HEIGHT: 62 IN | DIASTOLIC BLOOD PRESSURE: 74 MMHG | RESPIRATION RATE: 16 BRPM | OXYGEN SATURATION: 97 %

## 2023-05-01 VITALS
SYSTOLIC BLOOD PRESSURE: 122 MMHG | TEMPERATURE: 99 F | DIASTOLIC BLOOD PRESSURE: 62 MMHG | HEART RATE: 86 BPM | RESPIRATION RATE: 18 BRPM

## 2023-05-01 DIAGNOSIS — Z95.810 CARDIAC RESYNCHRONIZATION THERAPY DEFIBRILLATOR (CRT-D) IN PLACE: ICD-10-CM

## 2023-05-01 DIAGNOSIS — C34.91 NSCLC OF RIGHT LUNG: Primary | ICD-10-CM

## 2023-05-01 DIAGNOSIS — E03.9 HYPOTHYROIDISM, UNSPECIFIED TYPE: ICD-10-CM

## 2023-05-01 DIAGNOSIS — C34.91 NSCLC OF RIGHT LUNG: ICD-10-CM

## 2023-05-01 DIAGNOSIS — Z85.3 HISTORY OF BREAST CANCER IN FEMALE: ICD-10-CM

## 2023-05-01 DIAGNOSIS — J70.0 RADIATION PNEUMONITIS: ICD-10-CM

## 2023-05-01 DIAGNOSIS — R91.8 LUNG MASS: ICD-10-CM

## 2023-05-01 DIAGNOSIS — I10 ESSENTIAL HYPERTENSION: ICD-10-CM

## 2023-05-01 DIAGNOSIS — I42.0 DILATED CARDIOMYOPATHY: ICD-10-CM

## 2023-05-01 DIAGNOSIS — I44.7 LEFT BUNDLE-BRANCH BLOCK: ICD-10-CM

## 2023-05-01 LAB
ALBUMIN SERPL BCP-MCNC: 3.6 G/DL (ref 3.5–5.2)
ALP SERPL-CCNC: 57 U/L (ref 55–135)
ALT SERPL W/O P-5'-P-CCNC: 17 U/L (ref 10–44)
ANION GAP SERPL CALC-SCNC: 14 MMOL/L (ref 8–16)
AST SERPL-CCNC: 20 U/L (ref 10–40)
BASOPHILS # BLD AUTO: 0.06 K/UL (ref 0–0.2)
BASOPHILS NFR BLD: 0.8 % (ref 0–1.9)
BILIRUB SERPL-MCNC: 0.3 MG/DL (ref 0.1–1)
BUN SERPL-MCNC: 17 MG/DL (ref 8–23)
CALCIUM SERPL-MCNC: 10.4 MG/DL (ref 8.7–10.5)
CHLORIDE SERPL-SCNC: 102 MMOL/L (ref 95–110)
CO2 SERPL-SCNC: 25 MMOL/L (ref 23–29)
CREAT SERPL-MCNC: 1 MG/DL (ref 0.5–1.4)
DIFFERENTIAL METHOD: ABNORMAL
EOSINOPHIL # BLD AUTO: 0.2 K/UL (ref 0–0.5)
EOSINOPHIL NFR BLD: 2.2 % (ref 0–8)
ERYTHROCYTE [DISTWIDTH] IN BLOOD BY AUTOMATED COUNT: 15.8 % (ref 11.5–14.5)
EST. GFR  (NO RACE VARIABLE): >60 ML/MIN/1.73 M^2
GLUCOSE SERPL-MCNC: 105 MG/DL (ref 70–110)
HCT VFR BLD AUTO: 38 % (ref 37–48.5)
HGB BLD-MCNC: 11.8 G/DL (ref 12–16)
IMM GRANULOCYTES # BLD AUTO: 0.07 K/UL (ref 0–0.04)
IMM GRANULOCYTES NFR BLD AUTO: 0.9 % (ref 0–0.5)
LYMPHOCYTES # BLD AUTO: 1 K/UL (ref 1–4.8)
LYMPHOCYTES NFR BLD: 13 % (ref 18–48)
MCH RBC QN AUTO: 27.8 PG (ref 27–31)
MCHC RBC AUTO-ENTMCNC: 31.1 G/DL (ref 32–36)
MCV RBC AUTO: 89 FL (ref 82–98)
MONOCYTES # BLD AUTO: 0.7 K/UL (ref 0.3–1)
MONOCYTES NFR BLD: 9.3 % (ref 4–15)
NEUTROPHILS # BLD AUTO: 5.8 K/UL (ref 1.8–7.7)
NEUTROPHILS NFR BLD: 73.8 % (ref 38–73)
NRBC BLD-RTO: 0 /100 WBC
PLATELET # BLD AUTO: 250 K/UL (ref 150–450)
PMV BLD AUTO: 11.2 FL (ref 9.2–12.9)
POTASSIUM SERPL-SCNC: 4.5 MMOL/L (ref 3.5–5.1)
PROT SERPL-MCNC: 7.7 G/DL (ref 6–8.4)
RBC # BLD AUTO: 4.25 M/UL (ref 4–5.4)
SODIUM SERPL-SCNC: 141 MMOL/L (ref 136–145)
T4 FREE SERPL-MCNC: 0.84 NG/DL (ref 0.71–1.51)
TSH SERPL DL<=0.005 MIU/L-ACNC: 3.55 UIU/ML (ref 0.4–4)
WBC # BLD AUTO: 7.86 K/UL (ref 3.9–12.7)

## 2023-05-01 PROCEDURE — 63600175 PHARM REV CODE 636 W HCPCS: Mod: JG,HCNC | Performed by: INTERNAL MEDICINE

## 2023-05-01 PROCEDURE — 4010F ACE/ARB THERAPY RXD/TAKEN: CPT | Mod: HCNC,CPTII,S$GLB, | Performed by: INTERNAL MEDICINE

## 2023-05-01 PROCEDURE — 85025 COMPLETE CBC W/AUTO DIFF WBC: CPT | Mod: HCNC | Performed by: INTERNAL MEDICINE

## 2023-05-01 PROCEDURE — 3008F PR BODY MASS INDEX (BMI) DOCUMENTED: ICD-10-PCS | Mod: HCNC,CPTII,S$GLB, | Performed by: INTERNAL MEDICINE

## 2023-05-01 PROCEDURE — 84439 ASSAY OF FREE THYROXINE: CPT | Mod: HCNC | Performed by: INTERNAL MEDICINE

## 2023-05-01 PROCEDURE — 3052F HG A1C>EQUAL 8.0%<EQUAL 9.0%: CPT | Mod: HCNC,CPTII,S$GLB, | Performed by: INTERNAL MEDICINE

## 2023-05-01 PROCEDURE — 99499 UNLISTED E&M SERVICE: CPT | Mod: HCNC,S$GLB,, | Performed by: INTERNAL MEDICINE

## 2023-05-01 PROCEDURE — 3288F FALL RISK ASSESSMENT DOCD: CPT | Mod: HCNC,CPTII,S$GLB, | Performed by: INTERNAL MEDICINE

## 2023-05-01 PROCEDURE — 80053 COMPREHEN METABOLIC PANEL: CPT | Mod: HCNC | Performed by: INTERNAL MEDICINE

## 2023-05-01 PROCEDURE — 99215 OFFICE O/P EST HI 40 MIN: CPT | Mod: HCNC,S$GLB,, | Performed by: INTERNAL MEDICINE

## 2023-05-01 PROCEDURE — 3288F PR FALLS RISK ASSESSMENT DOCUMENTED: ICD-10-PCS | Mod: HCNC,CPTII,S$GLB, | Performed by: INTERNAL MEDICINE

## 2023-05-01 PROCEDURE — 4010F PR ACE/ARB THEARPY RXD/TAKEN: ICD-10-PCS | Mod: HCNC,CPTII,S$GLB, | Performed by: INTERNAL MEDICINE

## 2023-05-01 PROCEDURE — 1101F PR PT FALLS ASSESS DOC 0-1 FALLS W/OUT INJ PAST YR: ICD-10-PCS | Mod: HCNC,CPTII,S$GLB, | Performed by: INTERNAL MEDICINE

## 2023-05-01 PROCEDURE — 25000003 PHARM REV CODE 250: Mod: HCNC | Performed by: INTERNAL MEDICINE

## 2023-05-01 PROCEDURE — 84443 ASSAY THYROID STIM HORMONE: CPT | Mod: HCNC | Performed by: INTERNAL MEDICINE

## 2023-05-01 PROCEDURE — 1101F PT FALLS ASSESS-DOCD LE1/YR: CPT | Mod: HCNC,CPTII,S$GLB, | Performed by: INTERNAL MEDICINE

## 2023-05-01 PROCEDURE — 36415 COLL VENOUS BLD VENIPUNCTURE: CPT | Mod: HCNC | Performed by: INTERNAL MEDICINE

## 2023-05-01 PROCEDURE — 3074F SYST BP LT 130 MM HG: CPT | Mod: HCNC,CPTII,S$GLB, | Performed by: INTERNAL MEDICINE

## 2023-05-01 PROCEDURE — 99215 PR OFFICE/OUTPT VISIT, EST, LEVL V, 40-54 MIN: ICD-10-PCS | Mod: HCNC,S$GLB,, | Performed by: INTERNAL MEDICINE

## 2023-05-01 PROCEDURE — 99999 PR PBB SHADOW E&M-EST. PATIENT-LVL III: ICD-10-PCS | Mod: PBBFAC,HCNC,, | Performed by: INTERNAL MEDICINE

## 2023-05-01 PROCEDURE — 1157F ADVNC CARE PLAN IN RCRD: CPT | Mod: HCNC,CPTII,S$GLB, | Performed by: INTERNAL MEDICINE

## 2023-05-01 PROCEDURE — 96413 CHEMO IV INFUSION 1 HR: CPT | Mod: HCNC

## 2023-05-01 PROCEDURE — 3078F PR MOST RECENT DIASTOLIC BLOOD PRESSURE < 80 MM HG: ICD-10-PCS | Mod: HCNC,CPTII,S$GLB, | Performed by: INTERNAL MEDICINE

## 2023-05-01 PROCEDURE — 1126F PR PAIN SEVERITY QUANTIFIED, NO PAIN PRESENT: ICD-10-PCS | Mod: HCNC,CPTII,S$GLB, | Performed by: INTERNAL MEDICINE

## 2023-05-01 PROCEDURE — 1157F PR ADVANCE CARE PLAN OR EQUIV PRESENT IN MEDICAL RECORD: ICD-10-PCS | Mod: HCNC,CPTII,S$GLB, | Performed by: INTERNAL MEDICINE

## 2023-05-01 PROCEDURE — 3078F DIAST BP <80 MM HG: CPT | Mod: HCNC,CPTII,S$GLB, | Performed by: INTERNAL MEDICINE

## 2023-05-01 PROCEDURE — 3008F BODY MASS INDEX DOCD: CPT | Mod: HCNC,CPTII,S$GLB, | Performed by: INTERNAL MEDICINE

## 2023-05-01 PROCEDURE — 99999 PR PBB SHADOW E&M-EST. PATIENT-LVL III: CPT | Mod: PBBFAC,HCNC,, | Performed by: INTERNAL MEDICINE

## 2023-05-01 PROCEDURE — 99499 RISK ADDL DX/OHS AUDIT: ICD-10-PCS | Mod: HCNC,S$GLB,, | Performed by: INTERNAL MEDICINE

## 2023-05-01 PROCEDURE — 1126F AMNT PAIN NOTED NONE PRSNT: CPT | Mod: HCNC,CPTII,S$GLB, | Performed by: INTERNAL MEDICINE

## 2023-05-01 PROCEDURE — 3074F PR MOST RECENT SYSTOLIC BLOOD PRESSURE < 130 MM HG: ICD-10-PCS | Mod: HCNC,CPTII,S$GLB, | Performed by: INTERNAL MEDICINE

## 2023-05-01 PROCEDURE — 3052F PR MOST RECENT HEMOGLOBIN A1C LEVEL 8.0 - < 9.0%: ICD-10-PCS | Mod: HCNC,CPTII,S$GLB, | Performed by: INTERNAL MEDICINE

## 2023-05-01 PROCEDURE — 96417 CHEMO IV INFUS EACH ADDL SEQ: CPT | Mod: HCNC

## 2023-05-01 RX ORDER — EPINEPHRINE 0.3 MG/.3ML
0.3 INJECTION SUBCUTANEOUS ONCE AS NEEDED
Status: CANCELLED | OUTPATIENT
Start: 2023-05-22

## 2023-05-01 RX ORDER — HEPARIN 100 UNIT/ML
500 SYRINGE INTRAVENOUS
Status: CANCELLED | OUTPATIENT
Start: 2023-05-22

## 2023-05-01 RX ORDER — DIPHENHYDRAMINE HYDROCHLORIDE 50 MG/ML
50 INJECTION INTRAMUSCULAR; INTRAVENOUS ONCE AS NEEDED
Status: CANCELLED | OUTPATIENT
Start: 2023-05-01

## 2023-05-01 RX ORDER — DIPHENHYDRAMINE HYDROCHLORIDE 50 MG/ML
50 INJECTION INTRAMUSCULAR; INTRAVENOUS ONCE AS NEEDED
Status: DISCONTINUED | OUTPATIENT
Start: 2023-05-01 | End: 2023-05-01 | Stop reason: HOSPADM

## 2023-05-01 RX ORDER — HEPARIN 100 UNIT/ML
500 SYRINGE INTRAVENOUS
Status: DISCONTINUED | OUTPATIENT
Start: 2023-05-01 | End: 2023-05-01 | Stop reason: HOSPADM

## 2023-05-01 RX ORDER — EPINEPHRINE 0.3 MG/.3ML
0.3 INJECTION SUBCUTANEOUS ONCE AS NEEDED
Status: DISCONTINUED | OUTPATIENT
Start: 2023-05-01 | End: 2023-05-01 | Stop reason: HOSPADM

## 2023-05-01 RX ORDER — DIPHENHYDRAMINE HYDROCHLORIDE 50 MG/ML
50 INJECTION INTRAMUSCULAR; INTRAVENOUS ONCE AS NEEDED
Status: CANCELLED | OUTPATIENT
Start: 2023-05-22

## 2023-05-01 RX ORDER — EPINEPHRINE 0.3 MG/.3ML
0.3 INJECTION SUBCUTANEOUS ONCE AS NEEDED
Status: CANCELLED | OUTPATIENT
Start: 2023-05-01

## 2023-05-01 RX ORDER — SODIUM CHLORIDE 0.9 % (FLUSH) 0.9 %
10 SYRINGE (ML) INJECTION
Status: DISCONTINUED | OUTPATIENT
Start: 2023-05-01 | End: 2023-05-01 | Stop reason: HOSPADM

## 2023-05-01 RX ORDER — HEPARIN 100 UNIT/ML
500 SYRINGE INTRAVENOUS
Status: CANCELLED | OUTPATIENT
Start: 2023-05-01

## 2023-05-01 RX ORDER — SODIUM CHLORIDE 0.9 % (FLUSH) 0.9 %
10 SYRINGE (ML) INJECTION
Status: CANCELLED | OUTPATIENT
Start: 2023-05-22

## 2023-05-01 RX ORDER — SODIUM CHLORIDE 0.9 % (FLUSH) 0.9 %
10 SYRINGE (ML) INJECTION
Status: CANCELLED | OUTPATIENT
Start: 2023-05-01

## 2023-05-01 RX ADMIN — SODIUM CHLORIDE 91 MG: 9 INJECTION, SOLUTION INTRAVENOUS at 03:05

## 2023-05-01 RX ADMIN — SODIUM CHLORIDE: 9 INJECTION, SOLUTION INTRAVENOUS at 02:05

## 2023-05-01 RX ADMIN — SODIUM CHLORIDE 360 MG: 9 INJECTION, SOLUTION INTRAVENOUS at 02:05

## 2023-05-01 NOTE — PLAN OF CARE
1415-Labs , hx, and medications reviewed, patient was seen by MD prior to arrival. Assessment completed. Discussed plan of care with patient. Patient in agreement. Chair reclined and warm blanket and snack offered.

## 2023-05-01 NOTE — TELEPHONE ENCOUNTER
Spoke with the patient to confirm that 5/18 at 8 am worked for her. Patient confirmed appointment works for her.

## 2023-05-01 NOTE — Clinical Note
Hey, this is our patient with recurrence stage III SCC [place]. [place] [facility] [place] then re-evaluate for consolidative RT.  Looks like she has stable disease after 12 weeks of chemo + IO.  Continue systemic or consolidate?  Thanks Lucien

## 2023-05-01 NOTE — PROGRESS NOTES
ONCOLOGY FOLLOW UP VISIT.     Reason for visit: Re-staging scans and toxicity visit on 9LA for recurrent stage III NSCLC    Cancer/Stage/TNM:    Cancer Staging   NSCLC of right lung  Staging form: Lung, AJCC 8th Edition  - Clinical stage from 9/29/2020: Stage IIIA (cT3, cN1, cM0) - Signed by Ramo Tucker MD on 10/24/2020         Oncology History   NSCLC of right lung   9/29/2020 Cancer Staged    Staging form: Lung, AJCC 8th Edition  - Clinical stage from 9/29/2020: Stage IIIA (cT3, cN1, cM0)     10/14/2020 Initial Diagnosis    NSCLC of right lung     11/17/2020 - 12/30/2020 Radiation Therapy    Treating physician: Harvinder Lara     Site  Technique  Energy  Dose/Fx (Gy)  #Fx  Total Dose (Gy)    RLL Lung  VMAT  6X  2  30 / 30  60         2/6/2023 -  Chemotherapy    Treatment Summary   Plan Name: OP NSCLC NIVOLUMAB 360 MG D1 & D22 WITH IPILIMUMAB 1 MG/KG Q6W PLUS CARBOPLATIN (AUC) PACLITAXEL Q3W X2 DOSES  Treatment Goal: Control  Status: Active  Start Date: 2/6/2023  End Date: 12/30/2024 (Planned)  Provider: Javi Avina MD  Chemotherapy: CARBOplatin (PARAPLATIN) 525 mg in sodium chloride 0.9% 337.5 mL chemo infusion, 525 mg, Intravenous, Clinic/HOD 1 time, 1 of 1 cycle  Administration: 525 mg (2/6/2023), 490 mg (2/27/2023)  PACLitaxeL (TAXOL) 200 mg/m2 = 396 mg in sodium chloride 0.9% 500 mL chemo infusion, 200 mg/m2 = 396 mg (100 % of original dose 200 mg/m2), Intravenous, Clinic/HOD 1 time, 1 of 1 cycle  Dose modification: 200 mg/m2 (original dose 200 mg/m2, Cycle 1), 200 mg/m2 (original dose 200 mg/m2, Cycle 1)  Administration: 396 mg (2/6/2023), 396 mg (2/27/2023)          Interval History:   Today, patient reports feeling well overall. Denies any SOB or difficulty with breathing. Diarrhea has resolved. She has been active, energy is improved.     ROS:  Review of Systems   Constitutional:  Negative for chills, fever and weight loss.   HENT:  Negative for hearing loss, nosebleeds and sore throat.     Eyes:  Negative for blurred vision and double vision.   Respiratory:  Negative for cough, hemoptysis and shortness of breath.    Cardiovascular:  Negative for chest pain and leg swelling.   Gastrointestinal:  Positive for diarrhea (occassional (d/t gallbladder removal)). Negative for abdominal pain, blood in stool, heartburn, nausea and vomiting.   Genitourinary:  Negative for dysuria, frequency and hematuria.   Musculoskeletal:  Negative for back pain, joint pain and myalgias.   Skin:  Negative for itching and rash.   Neurological:  Negative for dizziness, tingling, sensory change, speech change and headaches.   Endo/Heme/Allergies:  Does not bruise/bleed easily.   Psychiatric/Behavioral:  The patient is not nervous/anxious.         A complete 12-point review of systems was reviewed and is negative except as mentioned above.     Past Medical History:   Past Medical History:   Diagnosis Date    AICD (automatic cardioverter/defibrillator) present     Asthma     bronchitis in past    Breast cancer 2016    right    Cardiac pacemaker     Cardiomyopathy     COPD (chronic obstructive pulmonary disease)     Diabetes mellitus, type 2     Hyperglycemia     Hyperlipidemia     Hypertension     Malignant neoplasm of overlapping sites of female breast 2/12/2016    Nuclear sclerosis of both eyes 8/12/2020    Respiratory distress 3/12/2020        Allergies:   Review of patient's allergies indicates:   Allergen Reactions    Taxol [paclitaxel]      Hypersensitivity reaction to taxol, symptoms included shortness of breath, nausea, dizziness, flushing     Carboplatin Other (See Comments)     Itching and hives    Adhesive Rash     tegaderm burns and blistered skin        Medications:   Current Outpatient Medications   Medication Sig Dispense Refill    ACCU-CHEK ALFRED PLUS TEST STRP Strp USE TO TEST THREE TIMES DAILY 200 strip 3    albuterol sulfate (PROAIR RESPICLICK) 90 mcg/actuation AePB Inhale 2 puffs into the  lungs every 4 (four) hours as needed. Rescue 1 each 5    amLODIPine (NORVASC) 5 MG tablet TAKE 1 TABLET BY MOUTH EVERY DAY (Patient taking differently: Take 5 mg by mouth 2 (two) times daily.) 90 tablet 3    atorvastatin (LIPITOR) 10 MG tablet Take 1 tablet (10 mg total) by mouth once daily. 90 tablet 3    buPROPion (WELLBUTRIN XL) 150 MG TB24 tablet Take 1 tablet (150 mg total) by mouth once daily. 90 tablet 0    cetirizine (ZYRTEC) 10 MG tablet Take 10 mg by mouth every evening.       fluticasone-salmeterol diskus inhaler 250-50 mcg Inhale 1 puff into the lungs 2 (two) times daily. Controller 180 each 0    losartan (COZAAR) 100 MG tablet Take 1 tablet (100 mg total) by mouth once daily. 90 tablet 2    metFORMIN (GLUCOPHAGE) 500 MG tablet Take 2 tablets (1,000 mg total) by mouth 2 (two) times daily with meals. 360 tablet 3    metoprolol succinate (TOPROL-XL) 100 MG 24 hr tablet Take 1 tablet (100 mg total) by mouth once daily. 90 tablet 3    multivitamin (THERAGRAN) per tablet Take 1 tablet by mouth once daily.      mupirocin (BACTROBAN) 2 % ointment Apply topically 3 (three) times daily. 30 g 1    OLANZapine (ZYPREXA) 5 MG tablet Take 1 tablet (5 mg total) by mouth every evening. Take 1 tablet by mouth every evening on days 1-4 of each chemotherapy cycle 30 tablet 1    ondansetron (ZOFRAN-ODT) 8 MG TbDL Take 1 tablet (8 mg total) by mouth every 8 (eight) hours as needed (nausea/vomiting). Take 1 tablet (8 mg) by mouth every 8 hours as needed for nausea/vomiting. 60 tablet 5    pantoprazole (PROTONIX) 40 MG tablet Take 1 tablet (40 mg total) by mouth once daily. 90 tablet 3    prochlorperazine (COMPAZINE) 10 MG tablet Take 1 tablet (10 mg total) by mouth every 6 (six) hours as needed (nausea/vomiting - second choice). 30 tablet 1    semaglutide (OZEMPIC) 0.25 mg or 0.5 mg(2 mg/1.5 mL) pen injector Take 0.25 mg for 4 weeks, then increase to 0.5 mg weekly 4 pen 3    sertraline (ZOLOFT) 100 MG tablet  TAKE 1 TABLET BY MOUTH EVERY EVENING. 90 tablet 3    tiotropium (SPIRIVA WITH HANDIHALER) 18 mcg inhalation capsule INHALE 1 CAPSULE BY MOUTH EVERY DAY. 90 capsule 3    triamcinolone acetonide 0.1% (KENALOG) 0.1 % cream Apply topically 2 (two) times daily. 453.6 g 2     No current facility-administered medications for this visit.     Facility-Administered Medications Ordered in Other Visits   Medication Dose Route Frequency Provider Last Rate Last Admin    alteplase injection 2 mg  2 mg Intra-Catheter PRN Javi Avina MD        diphenhydrAMINE injection 50 mg  50 mg Intravenous Once PRN Javi Avina MD        EPINEPHrine (EPIPEN) 0.3 mg/0.3 mL pen injection 0.3 mg  0.3 mg Intramuscular Once PRN Javi Avina MD        fentaNYL injection 25 mcg  25 mcg Intravenous Q5 Min PRN Keesha Martins MD        haloperidol lactate injection 0.5 mg  0.5 mg Intravenous Once PRN Keesha Martins MD        heparin, porcine (PF) 100 unit/mL injection flush 500 Units  500 Units Intravenous PRN Javi Avina MD        hydrocortisone sodium succinate injection 100 mg  100 mg Intravenous Once PRN Javi Avina MD        HYDROmorphone injection 0.2 mg  0.2 mg Intravenous Q5 Min PRN Keesha Martins MD        ipilimumab (YERVOY) 1 mg/kg = 91 mg in sodium chloride 0.9% 81.2 mL chemo infusion  1 mg/kg (Treatment Plan Recorded) Intravenous 1 time in Clinic/HOD Javi Avina MD        nivolumab 360 mg in sodium chloride 0.9% 99 mL infusion  360 mg Intravenous 1 time in Clinic/HOD Javi Avina  mL/hr at 05/01/23 1456 360 mg at 05/01/23 1456    ondansetron injection 4 mg  4 mg Intravenous Once PRN Keesha Martins MD        sodium chloride 0.9% flush 10 mL  10 mL Intravenous PRN Keesha Martins MD        sodium chloride 0.9% flush 10 mL  10 mL Intravenous PRN Javi Avina MD            Physical Exam:   /74 (BP Location: Right arm, Patient Position: Sitting, BP Method: Medium (Automatic))   Pulse 95    "Temp 98.5 °F (36.9 °C) (Oral)   Resp 16   Ht 5' 2" (1.575 m)   Wt 85 kg (187 lb 6.3 oz)   LMP  (LMP Unknown)   SpO2 97%   BMI 34.27 kg/m²      ECOG Performance Status: (foot note - ECOG PS provided by Eastern Cooperative Oncology Group) 0 - Asymptomatic    Physical Exam  Vitals and nursing note reviewed.   Constitutional:       Appearance: Normal appearance. She is well-developed and normal weight.   HENT:      Head: Normocephalic and atraumatic.   Eyes:      Conjunctiva/sclera: Conjunctivae normal.      Pupils: Pupils are equal, round, and reactive to light.   Pulmonary:      Effort: Pulmonary effort is normal. No respiratory distress.   Abdominal:      General: There is no distension.      Palpations: Abdomen is soft.   Musculoskeletal:         General: No swelling. Normal range of motion.      Cervical back: Normal range of motion and neck supple.      Left ankle: Swelling (trace) present.   Lymphadenopathy:      Cervical: No cervical adenopathy.   Skin:     General: Skin is warm and dry.      Findings: Rash (scalp) present.      Comments: Honey crusted lesions to scalp   Neurological:      General: No focal deficit present.      Mental Status: She is alert and oriented to person, place, and time.   Psychiatric:         Mood and Affect: Mood and affect normal.         Behavior: Behavior normal.               Labs:   Recent Results (from the past 48 hour(s))   CBC W/ AUTO DIFFERENTIAL    Collection Time: 05/01/23 12:33 PM   Result Value Ref Range    WBC 7.86 3.90 - 12.70 K/uL    RBC 4.25 4.00 - 5.40 M/uL    Hemoglobin 11.8 (L) 12.0 - 16.0 g/dL    Hematocrit 38.0 37.0 - 48.5 %    MCV 89 82 - 98 fL    MCH 27.8 27.0 - 31.0 pg    MCHC 31.1 (L) 32.0 - 36.0 g/dL    RDW 15.8 (H) 11.5 - 14.5 %    Platelets 250 150 - 450 K/uL    MPV 11.2 9.2 - 12.9 fL    Immature Granulocytes 0.9 (H) 0.0 - 0.5 %    Gran # (ANC) 5.8 1.8 - 7.7 K/uL    Immature Grans (Abs) 0.07 (H) 0.00 - 0.04 K/uL    Lymph # 1.0 1.0 - 4.8 K/uL    Mono " # 0.7 0.3 - 1.0 K/uL    Eos # 0.2 0.0 - 0.5 K/uL    Baso # 0.06 0.00 - 0.20 K/uL    nRBC 0 0 /100 WBC    Gran % 73.8 (H) 38.0 - 73.0 %    Lymph % 13.0 (L) 18.0 - 48.0 %    Mono % 9.3 4.0 - 15.0 %    Eosinophil % 2.2 0.0 - 8.0 %    Basophil % 0.8 0.0 - 1.9 %    Differential Method Automated    CMP    Collection Time: 05/01/23 12:33 PM   Result Value Ref Range    Sodium 141 136 - 145 mmol/L    Potassium 4.5 3.5 - 5.1 mmol/L    Chloride 102 95 - 110 mmol/L    CO2 25 23 - 29 mmol/L    Glucose 105 70 - 110 mg/dL    BUN 17 8 - 23 mg/dL    Creatinine 1.0 0.5 - 1.4 mg/dL    Calcium 10.4 8.7 - 10.5 mg/dL    Total Protein 7.7 6.0 - 8.4 g/dL    Albumin 3.6 3.5 - 5.2 g/dL    Total Bilirubin 0.3 0.1 - 1.0 mg/dL    Alkaline Phosphatase 57 55 - 135 U/L    AST 20 10 - 40 U/L    ALT 17 10 - 44 U/L    Anion Gap 14 8 - 16 mmol/L    eGFR >60.0 >60 mL/min/1.73 m^2   TSH    Collection Time: 05/01/23 12:33 PM   Result Value Ref Range    TSH 3.549 0.400 - 4.000 uIU/mL   FREE T4    Collection Time: 05/01/23 12:33 PM   Result Value Ref Range    Free T4 0.84 0.71 - 1.51 ng/dL            Imaging: Personally reviewed CT scans with patient showing new, progressing LLL lung nodule now 8 mm  CT Chest Abdomen Pelvis With Contrast  Narrative: EXAMINATION:  CT CHEST ABDOMEN PELVIS WITH CONTRAST (XPD)    CLINICAL HISTORY:  Non-small cell lung cancer (NSCLC), metastatic, assess treatment response; Malignant neoplasm of unspecified part of right bronchus or lung    TECHNIQUE:  Low dose axial images, sagittal and coronal reformations were obtained from the thoracic inlet to the pubic symphysis following the IV administration of 85 mL of Omnipaque 350 .  No oral contrast was administered.    COMPARISON:  Multiple CTs and PETs, most recent 01/12/2023.    FINDINGS:  LINES/TUBES:  Left chest wall AICD.    SOFT TISSUES: No axillary or subpectoral lymphadenopathy. The visualized thyroid gland is unremarkable.    HEART AND MEDIASTINUM: No mediastinal or hilar  lymphadenopathy. Heart and pericardium are within normal limits. Mild atherosclerosis.  The main pulmonary artery is normal in size.    PLEURA: No pleural effusion or pneumothorax.    LUNGS AND AIRWAYS: Airways are patent. Unchanged 6.6 x 6.3 x 5.6 cm masslike lesion in the right middle and lower lobes with associated volume loss, traction bronchiectasis, and architectural distortion (2:66 and 604:175).  Unchanged 9 mm solid pulmonary nodule in the left lower lobe (6:380).  There are numerous ground-glass and consolidative opacities scattered throughout both lungs, many of which are new from 12/19/2022 (images 134, 188, 244, 366, and 453 of series 6).  The previous 8 mm nodule in the left posteromedial lower lobe has nearly resolved (6:416).    HEPATOBILIARY: Diffuse hepatic steatosis.  No focal hepatic lesions. No biliary ductal dilatation.  Cholecystectomy.    SPLEEN: No splenomegaly.    PANCREAS: No focal masses or ductal dilatation.    ADRENALS: No adrenal nodules.    KIDNEYS/URETERS: No hydronephrosis, stones, or solid mass lesions.    PELVIC ORGANS/BLADDER: Bladder is decompressed.  Few uterine fibroids.  No adnexal mass.    PERITONEUM / RETROPERITONEUM: No free air or fluid.    LYMPH NODES: No abdominopelvic lymphadenopathy.    VESSELS: Atherosclerosis.  Portal veins are patent.    GI TRACT: No distention or wall thickening. Normal appendix.    BONES: Degenerative changes.  No fractures or focal osseous lesions.  Impression: Stable post radiation changes in the right middle and lower lobes for squamous cell carcinoma, noting that recent biopsy demonstrated residual tumor in this location.    No lymphadenopathy.    New ground-glass and consolidative opacities scattered throughout both lungs.  An inflammatory or infectious process is favored.  Malignancy is less likely given rapid development.  Attention on expected follow-up.    No abdominopelvic metastases.    Other findings as described.    This report was  flagged in Epic as abnormal.    Electronically signed by: Yvon Mancuso  Date:    04/27/2023  Time:    12:25            Diagnoses:       1. NSCLC of right lung    2. Radiation pneumonitis    3. History of breast cancer in female    4. Dilated cardiomyopathy    5. Left bundle-branch block    6. Cardiac resynchronization therapy defibrillator (CRT-D) in place    7. Essential hypertension        Assessment and Plan:         1. Recurrent NSCLC  Plan is for definitive chemoRT, will need re-staging scans prior.  Reviewed with patient in detail her diagnosis, stage, treatment options, and prognosis (3 year OS 66% vs. 43.5% without durvalumab) with standard of care chemoRT followed by maintenance immunotherapy.  Patient has consented for [id number] clinical trial, a randomized control trial evaluating combination chemoRT + durvalumab followed by maintenance vs. SOC chemoRT -> maintenance.  CT scans 7/2021 with CR.  - patient randomized on [id number] to SOC arm (Imfinzi) - completed 12/2021.    - CT scan 12/2022 with progressing pulmonary nodule in LLL, still with stable disease in RLL consolidation. PET scan also showing enlarging right lower lobe mass with increased hypermetabolic activity concerning for recurrence. Will present her case at the next thoracic tumor board to discuss biopsy and possible treatment options.   - Biopsy of lung mass consistent with SCC. Initiated on 9LA. Initial re-staging scans with stable disease.   - Labs acceptable for C3D1. Will discuss with radiation oncology whether consolidative RT is an option at this point.    2. Grade 2 initially, now resolved. There was increase in GGOs on recent CT, but patient is asymptomatic. Continue COPD maintenance inhalers and she has duonebs prn.     3. 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      4-7.  Stable, follows with cardiology. AICD placed, but patient now has recovered EF and only takes lasix prn.  NYHA class I.    Patient is in  agreement with the proposed treatment plan. All questions were answered to the patient's satisfaction. Pt knows to call clinic if anything is needed before the next clinic visit.    MDM includes:    - Acute or chronic illness or injury that poses a threat to life or bodily function  - Independent review and explanation of 2 results from unique tests  - Discussion of management and ordering 2 unique tests  - Extensive discussion of treatment and management  - Prescription drug management  - Drug therapy requiring intensive monitoring for toxicity    Javi Avina M.D.  Hematology/Oncology Attending  Director [facility]  [facility]              Route Chart for Scheduling    Med Onc Chart Routing      Follow up with physician    Follow up with DANIEL 3 weeks. with labs prior to next infusion   Infusion scheduling note    Injection scheduling note    Labs CBC, CMP, TSH and free T4   Schedulin day prior to infusion  Preferred lab:  Lab interval:     Imaging    Pharmacy appointment    Other referrals         Treatment Plan Information   OP NSCLC NIVOLUMAB 360 MG D1 & D22 WITH IPILIMUMAB 1 MG/KG Q6W PLUS CARBOPLATIN (AUC) PACLITAXEL Q3W X2 DOSES   Javi Avina MD   Upcoming Treatment Dates - OP NSCLC NIVOLUMAB 360 MG D1 & D22 WITH IPILIMUMAB 1 MG/KG Q6W PLUS CARBOPLATIN (AUC) PACLITAXEL Q3W X2 DOSES    2023       Immunotherapy       nivolumab 360 mg in sodium chloride 0.9% 86 mL infusion  2023       Immunotherapy       nivolumab 360 mg in sodium chloride 0.9% 86 mL infusion       ipilimumab (YERVOY) 1 mg/kg = 91 mg in sodium chloride 0.9% 68.2 mL chemo infusion  7/3/2023       Immunotherapy       nivolumab 360 mg in sodium chloride 0.9% 86 mL infusion  2023       Immunotherapy       nivolumab 360 mg in sodium chloride 0.9% 86 mL infusion       ipilimumab (YERVOY) 1 mg/kg = 91 mg in sodium chloride 0.9% 68.2 mL chemo  infusion

## 2023-05-01 NOTE — PLAN OF CARE
1556-Patient tolerated treatment well. PIV removed and site dressed. Discharged without complaints or S/S of adverse event.  Instructed to call provider for any questions or concerns.

## 2023-05-08 ENCOUNTER — CLINICAL SUPPORT (OUTPATIENT)
Dept: CARDIOLOGY | Facility: HOSPITAL | Age: 68
End: 2023-05-08
Payer: MEDICARE

## 2023-05-08 DIAGNOSIS — Z95.810 PRESENCE OF AUTOMATIC (IMPLANTABLE) CARDIAC DEFIBRILLATOR: ICD-10-CM

## 2023-05-08 PROCEDURE — 93295 CARDIAC DEVICE CHECK - REMOTE: ICD-10-PCS | Mod: HCNC,,, | Performed by: INTERNAL MEDICINE

## 2023-05-08 PROCEDURE — 93296 REM INTERROG EVL PM/IDS: CPT | Mod: HCNC | Performed by: INTERNAL MEDICINE

## 2023-05-08 PROCEDURE — 93295 DEV INTERROG REMOTE 1/2/MLT: CPT | Mod: HCNC,,, | Performed by: INTERNAL MEDICINE

## 2023-05-17 ENCOUNTER — TELEPHONE (OUTPATIENT)
Dept: PALLIATIVE MEDICINE | Facility: CLINIC | Age: 68
End: 2023-05-17
Payer: MEDICARE

## 2023-05-18 ENCOUNTER — OFFICE VISIT (OUTPATIENT)
Dept: PALLIATIVE MEDICINE | Facility: CLINIC | Age: 68
End: 2023-05-18
Payer: MEDICARE

## 2023-05-18 ENCOUNTER — HOSPITAL ENCOUNTER (OUTPATIENT)
Dept: RADIATION THERAPY | Facility: HOSPITAL | Age: 68
Discharge: HOME | End: 2023-05-18
Attending: INTERNAL MEDICINE
Payer: MEDICARE

## 2023-05-18 ENCOUNTER — OFFICE VISIT (OUTPATIENT)
Dept: RADIATION ONCOLOGY | Facility: CLINIC | Age: 68
End: 2023-05-18
Payer: MEDICARE

## 2023-05-18 ENCOUNTER — TELEPHONE (OUTPATIENT)
Dept: ELECTROPHYSIOLOGY | Facility: CLINIC | Age: 68
End: 2023-05-18
Payer: MEDICARE

## 2023-05-18 VITALS
HEART RATE: 104 BPM | HEIGHT: 62 IN | SYSTOLIC BLOOD PRESSURE: 132 MMHG | DIASTOLIC BLOOD PRESSURE: 77 MMHG | TEMPERATURE: 98 F | BODY MASS INDEX: 34.08 KG/M2 | WEIGHT: 185.19 LBS | OXYGEN SATURATION: 93 %

## 2023-05-18 VITALS
TEMPERATURE: 97 F | HEART RATE: 110 BPM | OXYGEN SATURATION: 91 % | SYSTOLIC BLOOD PRESSURE: 120 MMHG | DIASTOLIC BLOOD PRESSURE: 55 MMHG | RESPIRATION RATE: 17 BRPM | BODY MASS INDEX: 34.44 KG/M2 | WEIGHT: 187.13 LBS | HEIGHT: 62 IN

## 2023-05-18 DIAGNOSIS — Z71.89 ADVANCED CARE PLANNING/COUNSELING DISCUSSION: ICD-10-CM

## 2023-05-18 DIAGNOSIS — Z51.5 ENCOUNTER FOR PALLIATIVE CARE: ICD-10-CM

## 2023-05-18 DIAGNOSIS — R06.00 DYSPNEA, UNSPECIFIED TYPE: ICD-10-CM

## 2023-05-18 DIAGNOSIS — C34.91 NSCLC OF RIGHT LUNG: Primary | ICD-10-CM

## 2023-05-18 PROCEDURE — 1126F PR PAIN SEVERITY QUANTIFIED, NO PAIN PRESENT: ICD-10-PCS | Mod: HCNC,CPTII,, | Performed by: STUDENT IN AN ORGANIZED HEALTH CARE EDUCATION/TRAINING PROGRAM

## 2023-05-18 PROCEDURE — 1123F PR ADV CARE PLAN DISCUSSED, PLAN OR SURROGATE DOCUMENTED: ICD-10-PCS | Mod: HCNC,CPTII,, | Performed by: RADIOLOGY

## 2023-05-18 PROCEDURE — 99499 RISK ADDL DX/OHS AUDIT: ICD-10-PCS | Mod: HCNC,S$GLB,, | Performed by: STUDENT IN AN ORGANIZED HEALTH CARE EDUCATION/TRAINING PROGRAM

## 2023-05-18 PROCEDURE — 77263 THER RADIOLOGY TX PLNG CPLX: CPT | Mod: HCNC,,, | Performed by: RADIOLOGY

## 2023-05-18 PROCEDURE — 77263 PR  RADIATION THERAPY PLAN COMPLEX: ICD-10-PCS | Mod: HCNC,,, | Performed by: RADIOLOGY

## 2023-05-18 PROCEDURE — 99999 PR PBB SHADOW E&M-EST. PATIENT-LVL IV: ICD-10-PCS | Mod: PBBFAC,HCNC,, | Performed by: RADIOLOGY

## 2023-05-18 PROCEDURE — 77014 PR  CT GUIDANCE PLACEMENT RAD THERAPY FIELDS: CPT | Mod: 26,HCNC,, | Performed by: RADIOLOGY

## 2023-05-18 PROCEDURE — 1101F PR PT FALLS ASSESS DOC 0-1 FALLS W/OUT INJ PAST YR: ICD-10-PCS | Mod: HCNC,CPTII,, | Performed by: RADIOLOGY

## 2023-05-18 PROCEDURE — 99999 PR PBB SHADOW E&M-EST. PATIENT-LVL IV: CPT | Mod: PBBFAC,HCNC,, | Performed by: RADIOLOGY

## 2023-05-18 PROCEDURE — 3078F DIAST BP <80 MM HG: CPT | Mod: HCNC,CPTII,, | Performed by: RADIOLOGY

## 2023-05-18 PROCEDURE — 99205 OFFICE O/P NEW HI 60 MIN: CPT | Mod: HCNC,,, | Performed by: STUDENT IN AN ORGANIZED HEALTH CARE EDUCATION/TRAINING PROGRAM

## 2023-05-18 PROCEDURE — 77334 PR  RADN TREATMENT AID(S) COMPLX: ICD-10-PCS | Mod: 26,HCNC,, | Performed by: RADIOLOGY

## 2023-05-18 PROCEDURE — 3052F HG A1C>EQUAL 8.0%<EQUAL 9.0%: CPT | Mod: HCNC,CPTII,, | Performed by: RADIOLOGY

## 2023-05-18 PROCEDURE — 3052F PR MOST RECENT HEMOGLOBIN A1C LEVEL 8.0 - < 9.0%: ICD-10-PCS | Mod: HCNC,CPTII,, | Performed by: STUDENT IN AN ORGANIZED HEALTH CARE EDUCATION/TRAINING PROGRAM

## 2023-05-18 PROCEDURE — 77014 HC CT GUIDANCE RADIATION THERAPY FLDS PLACEMENT: CPT | Mod: TC,HCNC | Performed by: RADIOLOGY

## 2023-05-18 PROCEDURE — 99214 OFFICE O/P EST MOD 30 MIN: CPT | Mod: HCNC,25 | Performed by: RADIOLOGY

## 2023-05-18 PROCEDURE — 4010F ACE/ARB THERAPY RXD/TAKEN: CPT | Mod: HCNC,CPTII,, | Performed by: STUDENT IN AN ORGANIZED HEALTH CARE EDUCATION/TRAINING PROGRAM

## 2023-05-18 PROCEDURE — 1159F PR MEDICATION LIST DOCUMENTED IN MEDICAL RECORD: ICD-10-PCS | Mod: HCNC,CPTII,, | Performed by: RADIOLOGY

## 2023-05-18 PROCEDURE — 1159F MED LIST DOCD IN RCRD: CPT | Mod: HCNC,CPTII,, | Performed by: RADIOLOGY

## 2023-05-18 PROCEDURE — 1123F ACP DISCUSS/DSCN MKR DOCD: CPT | Mod: HCNC,CPTII,, | Performed by: STUDENT IN AN ORGANIZED HEALTH CARE EDUCATION/TRAINING PROGRAM

## 2023-05-18 PROCEDURE — 99499 UNLISTED E&M SERVICE: CPT | Mod: HCNC,S$GLB,, | Performed by: STUDENT IN AN ORGANIZED HEALTH CARE EDUCATION/TRAINING PROGRAM

## 2023-05-18 PROCEDURE — 1126F PR PAIN SEVERITY QUANTIFIED, NO PAIN PRESENT: ICD-10-PCS | Mod: HCNC,CPTII,, | Performed by: RADIOLOGY

## 2023-05-18 PROCEDURE — 3052F HG A1C>EQUAL 8.0%<EQUAL 9.0%: CPT | Mod: HCNC,CPTII,, | Performed by: STUDENT IN AN ORGANIZED HEALTH CARE EDUCATION/TRAINING PROGRAM

## 2023-05-18 PROCEDURE — 1123F PR ADV CARE PLAN DISCUSSED, PLAN OR SURROGATE DOCUMENTED: ICD-10-PCS | Mod: HCNC,CPTII,, | Performed by: STUDENT IN AN ORGANIZED HEALTH CARE EDUCATION/TRAINING PROGRAM

## 2023-05-18 PROCEDURE — 3074F SYST BP LT 130 MM HG: CPT | Mod: HCNC,CPTII,, | Performed by: RADIOLOGY

## 2023-05-18 PROCEDURE — 3008F PR BODY MASS INDEX (BMI) DOCUMENTED: ICD-10-PCS | Mod: HCNC,CPTII,, | Performed by: STUDENT IN AN ORGANIZED HEALTH CARE EDUCATION/TRAINING PROGRAM

## 2023-05-18 PROCEDURE — 77014 PR  CT GUIDANCE PLACEMENT RAD THERAPY FIELDS: ICD-10-PCS | Mod: 26,HCNC,, | Performed by: RADIOLOGY

## 2023-05-18 PROCEDURE — 3078F PR MOST RECENT DIASTOLIC BLOOD PRESSURE < 80 MM HG: ICD-10-PCS | Mod: HCNC,CPTII,, | Performed by: STUDENT IN AN ORGANIZED HEALTH CARE EDUCATION/TRAINING PROGRAM

## 2023-05-18 PROCEDURE — 99215 PR OFFICE/OUTPT VISIT, EST, LEVL V, 40-54 MIN: ICD-10-PCS | Mod: HCNC,25,, | Performed by: RADIOLOGY

## 2023-05-18 PROCEDURE — 3074F PR MOST RECENT SYSTOLIC BLOOD PRESSURE < 130 MM HG: ICD-10-PCS | Mod: HCNC,CPTII,, | Performed by: RADIOLOGY

## 2023-05-18 PROCEDURE — 99999 PR PBB SHADOW E&M-EST. PATIENT-LVL V: ICD-10-PCS | Mod: PBBFAC,HCNC,, | Performed by: STUDENT IN AN ORGANIZED HEALTH CARE EDUCATION/TRAINING PROGRAM

## 2023-05-18 PROCEDURE — 99497 ADVNCD CARE PLAN 30 MIN: CPT | Mod: HCNC,,, | Performed by: STUDENT IN AN ORGANIZED HEALTH CARE EDUCATION/TRAINING PROGRAM

## 2023-05-18 PROCEDURE — 99215 OFFICE O/P EST HI 40 MIN: CPT | Mod: 27,HCNC | Performed by: STUDENT IN AN ORGANIZED HEALTH CARE EDUCATION/TRAINING PROGRAM

## 2023-05-18 PROCEDURE — 1126F AMNT PAIN NOTED NONE PRSNT: CPT | Mod: HCNC,CPTII,, | Performed by: STUDENT IN AN ORGANIZED HEALTH CARE EDUCATION/TRAINING PROGRAM

## 2023-05-18 PROCEDURE — 99497 PR ADVNCD CARE PLAN 30 MIN: ICD-10-PCS | Mod: HCNC,,, | Performed by: STUDENT IN AN ORGANIZED HEALTH CARE EDUCATION/TRAINING PROGRAM

## 2023-05-18 PROCEDURE — 1126F AMNT PAIN NOTED NONE PRSNT: CPT | Mod: HCNC,CPTII,, | Performed by: RADIOLOGY

## 2023-05-18 PROCEDURE — 4010F ACE/ARB THERAPY RXD/TAKEN: CPT | Mod: HCNC,CPTII,, | Performed by: RADIOLOGY

## 2023-05-18 PROCEDURE — 99999 PR PBB SHADOW E&M-EST. PATIENT-LVL V: CPT | Mod: PBBFAC,HCNC,, | Performed by: STUDENT IN AN ORGANIZED HEALTH CARE EDUCATION/TRAINING PROGRAM

## 2023-05-18 PROCEDURE — 1123F ACP DISCUSS/DSCN MKR DOCD: CPT | Mod: HCNC,CPTII,, | Performed by: RADIOLOGY

## 2023-05-18 PROCEDURE — 3008F BODY MASS INDEX DOCD: CPT | Mod: HCNC,CPTII,, | Performed by: RADIOLOGY

## 2023-05-18 PROCEDURE — 3078F DIAST BP <80 MM HG: CPT | Mod: HCNC,CPTII,, | Performed by: STUDENT IN AN ORGANIZED HEALTH CARE EDUCATION/TRAINING PROGRAM

## 2023-05-18 PROCEDURE — 77334 RADIATION TREATMENT AID(S): CPT | Mod: TC,HCNC | Performed by: RADIOLOGY

## 2023-05-18 PROCEDURE — 1159F PR MEDICATION LIST DOCUMENTED IN MEDICAL RECORD: ICD-10-PCS | Mod: HCNC,CPTII,, | Performed by: STUDENT IN AN ORGANIZED HEALTH CARE EDUCATION/TRAINING PROGRAM

## 2023-05-18 PROCEDURE — 77334 RADIATION TREATMENT AID(S): CPT | Mod: 26,HCNC,, | Performed by: RADIOLOGY

## 2023-05-18 PROCEDURE — 3288F FALL RISK ASSESSMENT DOCD: CPT | Mod: HCNC,CPTII,, | Performed by: RADIOLOGY

## 2023-05-18 PROCEDURE — 3008F PR BODY MASS INDEX (BMI) DOCUMENTED: ICD-10-PCS | Mod: HCNC,CPTII,, | Performed by: RADIOLOGY

## 2023-05-18 PROCEDURE — 3288F PR FALLS RISK ASSESSMENT DOCUMENTED: ICD-10-PCS | Mod: HCNC,CPTII,, | Performed by: RADIOLOGY

## 2023-05-18 PROCEDURE — 4010F PR ACE/ARB THEARPY RXD/TAKEN: ICD-10-PCS | Mod: HCNC,CPTII,, | Performed by: RADIOLOGY

## 2023-05-18 PROCEDURE — 1160F PR REVIEW ALL MEDS BY PRESCRIBER/CLIN PHARMACIST DOCUMENTED: ICD-10-PCS | Mod: HCNC,CPTII,, | Performed by: STUDENT IN AN ORGANIZED HEALTH CARE EDUCATION/TRAINING PROGRAM

## 2023-05-18 PROCEDURE — 1160F RVW MEDS BY RX/DR IN RCRD: CPT | Mod: HCNC,CPTII,, | Performed by: STUDENT IN AN ORGANIZED HEALTH CARE EDUCATION/TRAINING PROGRAM

## 2023-05-18 PROCEDURE — 3052F PR MOST RECENT HEMOGLOBIN A1C LEVEL 8.0 - < 9.0%: ICD-10-PCS | Mod: HCNC,CPTII,, | Performed by: RADIOLOGY

## 2023-05-18 PROCEDURE — 1101F PT FALLS ASSESS-DOCD LE1/YR: CPT | Mod: HCNC,CPTII,, | Performed by: RADIOLOGY

## 2023-05-18 PROCEDURE — 3008F BODY MASS INDEX DOCD: CPT | Mod: HCNC,CPTII,, | Performed by: STUDENT IN AN ORGANIZED HEALTH CARE EDUCATION/TRAINING PROGRAM

## 2023-05-18 PROCEDURE — 1159F MED LIST DOCD IN RCRD: CPT | Mod: HCNC,CPTII,, | Performed by: STUDENT IN AN ORGANIZED HEALTH CARE EDUCATION/TRAINING PROGRAM

## 2023-05-18 PROCEDURE — 3078F PR MOST RECENT DIASTOLIC BLOOD PRESSURE < 80 MM HG: ICD-10-PCS | Mod: HCNC,CPTII,, | Performed by: RADIOLOGY

## 2023-05-18 PROCEDURE — 4010F PR ACE/ARB THEARPY RXD/TAKEN: ICD-10-PCS | Mod: HCNC,CPTII,, | Performed by: STUDENT IN AN ORGANIZED HEALTH CARE EDUCATION/TRAINING PROGRAM

## 2023-05-18 PROCEDURE — 3075F SYST BP GE 130 - 139MM HG: CPT | Mod: HCNC,CPTII,, | Performed by: STUDENT IN AN ORGANIZED HEALTH CARE EDUCATION/TRAINING PROGRAM

## 2023-05-18 PROCEDURE — 99215 OFFICE O/P EST HI 40 MIN: CPT | Mod: HCNC,25,, | Performed by: RADIOLOGY

## 2023-05-18 PROCEDURE — 99205 PR OFFICE/OUTPT VISIT, NEW, LEVL V, 60-74 MIN: ICD-10-PCS | Mod: HCNC,,, | Performed by: STUDENT IN AN ORGANIZED HEALTH CARE EDUCATION/TRAINING PROGRAM

## 2023-05-18 PROCEDURE — 3075F PR MOST RECENT SYSTOLIC BLOOD PRESS GE 130-139MM HG: ICD-10-PCS | Mod: HCNC,CPTII,, | Performed by: STUDENT IN AN ORGANIZED HEALTH CARE EDUCATION/TRAINING PROGRAM

## 2023-05-18 NOTE — PROGRESS NOTES
"05/18/2023    Radiation Oncology Follow-Up Visit    Prior Radiation History:    Site  Technique  Energy  Dose/Fx (Gy)  #Fx  Total Dose (Gy)  Start Date  End Date  Elapsed Days    RLL Lung  VMAT  6X  2  30 / 30  60  11/17/2020 12/30/2020  43        Assessment   This is a 67 y.o. y/o female with Stage IIIA (cT3 cN1 M0) RLL NSCLC (squam) diagnosed on EBUS biopsy of 11R node 10/13/20. Resection would require bilobectomy, which her PFT's could not support. She enrolled in [id number] "Randomized Phase III Trial of [id number] (Durvalumab) as Concurrent and Consolidative Therapy or Consolidative Therapy Alone for Unresectable Stage 3 NSCLC " and completed definitive chemo-RT to 60 Gy in 30 fx on 12/30/20. In 1/2023 she developed biopsy-confirmed recurrence in the RLL with no other evidence of disease on PET or CT Head w/ contrast. She was treated with combined immunotherapy. Re-staging CT C/A/P 4/27/23 without evidence of progressive or new disease. She is referred for consolidation radiation.     The location of her recurrence is directly in-field, but it is fortunately away from OARs including esophagus and spinal cord. I believe that a definitive intent dose can be safely delivered to this area while limiting risk to adjacent organs. Potential side effects including pneumonitis, esophagitis, dermatitis, pulmonary fibrosis were reviewed. At the end of our discussion, she was in agreement with proceeding with the recommended treatment.        Plan   1) CT Simulation today for radiation treatment planning.   2) She will receive Nivo alone on 5/22; Yervoy held due to increased risk of pneumonitis w/ radiation. Plan to start radiation ~5/29.        Chief Complaint   Patient presents with    Lung Cancer       HPI: Since I last saw Ms. Montoya, she was found to have increased FDG avidity in the previously treated RLL primary site on PET 1/12/23; there was no other evidence of FDG avid disease on PET. Biopsy of the mass 1/24/23 " revealed squamous cell carcinoma. She started chemo-immunotherapy with Ipi/Nivo and Carbo/Taxol in 2023 and began Ipi/Nivo only in 2023. Most recent CT C/A/P demonstrated stable consolidation in the RLL and no evidence of new disease. She is referred for consideration of consolidation radiation.    In clinic today, she is unaccompanied. Feeling well overall, some fatigue with chemo. Denies dyspnea, cough, or chest pain.        Past Medical History:   Diagnosis Date    AICD (automatic cardioverter/defibrillator) present     Asthma     bronchitis in past    Breast cancer 2016    right    Cardiac pacemaker     Cardiomyopathy     COPD (chronic obstructive pulmonary disease)     Diabetes mellitus, type 2     Hyperglycemia     Hyperlipidemia     Hypertension     Malignant neoplasm of overlapping sites of female breast 2016    Nuclear sclerosis of both eyes 2020    Respiratory distress 3/12/2020       Past Surgical History:   Procedure Laterality Date    BIOPSY, WITH CT GUIDANCE Right 2023    Procedure: LUNG MASS BIOPSY, WITH CT GUIDANCE;  Surgeon: Yehuda Green MD;  Location: [facility] [place] CATH LAB;  Service: Radiology;  Laterality: Right;    BREAST BIOPSY Right 2016    IDC    BREAST LUMPECTOMY Right     CARDIAC CATHETERIZATION Bilateral 2017    CARDIAC DEFIBRILLATOR PLACEMENT Left 08/10/2017    CARDIAC DEFIBRILLATOR PLACEMENT Left 2018     SECTION      x2    CHOLECYSTECTOMY      INSERTION OF TUNNELED CENTRAL VENOUS CATHETER (CVC) WITH SUBCUTANEOUS PORT Right 2020    Procedure: [id number], RIGHT;  Surgeon: Josefa Caceres MD;  Location: [address];  Service: General;  Laterality: Right;  Port-a-cath placed to R. IJ    LUNG BIOPSY N/A 2020    Procedure: BIOPSY, LUNG;  Surgeon: Kalpesh Diagnostic Provider;  Location: [facility] OR;  Service: Radiology;  Laterality: N/A;  8AM START  RN PREOP 2020---COVID NEGATIVE    NAVIGATIONAL BRONCHOSCOPY N/A 10/13/2020     Procedure: BRONCHOSCOPY, NAVIGATIONAL;  Surgeon: Jamilah Weaver MD;  [facility];  Service: Pulmonary;  Laterality: N/A;    REVISION OF IMPLANTABLE CARDIOVERTER-DEFIBRILLATOR (ICD) ELECTRODE LEAD PLACEMENT N/A 6/15/2018    Procedure: REVISION-LEAD-ICD;  Surgeon: Javy Gates MD;  Location: [facility] CATH LAB;  Service: Cardiology;  Laterality: N/A;  LBBB, Bi-V ICD HIS Ld rev, MDT, Choice, MB, 3 Prep    ROBOT-ASSISTED LAPAROSCOPIC LYMPHADENECTOMY USING DA SALVADOR XI Right 10/23/2020    Procedure: XI ROBOTIC LYMPHADENECTOMY;  Surgeon: Ramo Tucker MD;  [facility];  Service: Thoracic;  Laterality: Right;    TUBAL LIGATION         Social History     Tobacco Use    Smoking status: Former     Packs/day: 0.50     Years: 40.00     Pack years: 20.00     Types: Cigarettes     Quit date: 2016     Years since quittin.7     Passive exposure: Past    Smokeless tobacco: Never   Substance Use Topics    Alcohol use: Yes     Alcohol/week: 1.0 standard drink     Types: 1 Glasses of wine per week     Comment: rare- holiday    Drug use: No       Cancer-related family history is negative for Breast cancer, Ovarian cancer, and Cancer.    Current Outpatient Medications on File Prior to Visit   Medication Sig Dispense Refill    ACCU-CHEK ALFRED PLUS TEST STRP Strp USE TO TEST THREE TIMES DAILY 200 strip 3    albuterol sulfate (PROAIR RESPICLICK) 90 mcg/actuation AePB Inhale 2 puffs into the lungs every 4 (four) hours as needed. Rescue 1 each 5    amLODIPine (NORVASC) 5 MG tablet TAKE 1 TABLET BY MOUTH EVERY DAY (Patient taking differently: Take 5 mg by mouth 2 (two) times daily.) 90 tablet 3    atorvastatin (LIPITOR) 10 MG tablet Take 1 tablet (10 mg total) by mouth once daily. 90 tablet 3    buPROPion (WELLBUTRIN XL) 150 MG TB24 tablet Take 1 tablet (150 mg total) by mouth once daily. 90 tablet 0    cetirizine (ZYRTEC) 10 MG tablet Take 10 mg by mouth every evening.       fluticasone-salmeterol  diskus inhaler 250-50 mcg Inhale 1 puff into the lungs 2 (two) times daily. Controller 180 each 0    losartan (COZAAR) 100 MG tablet Take 1 tablet (100 mg total) by mouth once daily. 90 tablet 2    metFORMIN (GLUCOPHAGE) 500 MG tablet Take 2 tablets (1,000 mg total) by mouth 2 (two) times daily with meals. 360 tablet 3    metoprolol succinate (TOPROL-XL) 100 MG 24 hr tablet Take 1 tablet (100 mg total) by mouth once daily. 90 tablet 3    multivitamin (THERAGRAN) per tablet Take 1 tablet by mouth once daily.      mupirocin (BACTROBAN) 2 % ointment Apply topically 3 (three) times daily. 30 g 1    OLANZapine (ZYPREXA) 5 MG tablet Take 1 tablet (5 mg total) by mouth every evening. Take 1 tablet by mouth every evening on days 1-4 of each chemotherapy cycle 30 tablet 1    ondansetron (ZOFRAN-ODT) 8 MG TbDL Take 1 tablet (8 mg total) by mouth every 8 (eight) hours as needed (nausea/vomiting). Take 1 tablet (8 mg) by mouth every 8 hours as needed for nausea/vomiting. 60 tablet 5    pantoprazole (PROTONIX) 40 MG tablet Take 1 tablet (40 mg total) by mouth once daily. 90 tablet 3    prochlorperazine (COMPAZINE) 10 MG tablet Take 1 tablet (10 mg total) by mouth every 6 (six) hours as needed (nausea/vomiting - second choice). 30 tablet 1    semaglutide (OZEMPIC) 0.25 mg or 0.5 mg(2 mg/1.5 mL) pen injector Take 0.25 mg for 4 weeks, then increase to 0.5 mg weekly 4 pen 3    sertraline (ZOLOFT) 100 MG tablet TAKE 1 TABLET BY MOUTH EVERY EVENING. 90 tablet 3    tiotropium (SPIRIVA WITH HANDIHALER) 18 mcg inhalation capsule INHALE 1 CAPSULE BY MOUTH EVERY DAY. 90 capsule 3    triamcinolone acetonide 0.1% (KENALOG) 0.1 % cream Apply topically 2 (two) times daily. 453.6 g 2     Current Facility-Administered Medications on File Prior to Visit   Medication Dose Route Frequency Provider Last Rate Last Admin    fentaNYL injection 25 mcg  25 mcg Intravenous Q5 Min PRN Yelin Eliezer, MD        haloperidol lactate injection 0.5 mg  0.5 mg  "Intravenous Once PRN Keesha Martins MD        HYDROmorphone injection 0.2 mg  0.2 mg Intravenous Q5 Min PRN Keesha Martins MD        ondansetron injection 4 mg  4 mg Intravenous Once PRN Keesha Martins MD        sodium chloride 0.9% flush 10 mL  10 mL Intravenous PRN Keesha Martins MD           Review of patient's allergies indicates:   Allergen Reactions    Taxol [paclitaxel]      Hypersensitivity reaction to taxol, symptoms included shortness of breath, nausea, dizziness, flushing     Carboplatin Other (See Comments)     Itching and hives    Adhesive Rash     tegaderm burns and blistered skin       Vital Signs: BP (!) 120/55 (BP Location: Left arm, Patient Position: Sitting)   Pulse 110   Temp 97.2 °F (36.2 °C)   Resp 17   Ht 5' 2" (1.575 m)   Wt 84.9 kg (187 lb 1.6 oz)   LMP  (LMP Unknown)   SpO2 (!) 91%   BMI 34.22 kg/m²     ECOG Performance Status: 1    Physical Exam  Vitals reviewed.   Constitutional:       Appearance: Normal appearance.   HENT:      Head: Normocephalic and atraumatic.      Comments: +Alopecia  Eyes:      General: No scleral icterus.     Extraocular Movements: Extraocular movements intact.   Pulmonary:      Effort: No accessory muscle usage or respiratory distress.   Abdominal:      General: There is no distension.   Musculoskeletal:         General: Normal range of motion.      Cervical back: Normal range of motion and neck supple.   Lymphadenopathy:      Cervical: No cervical adenopathy.   Skin:     General: Skin is warm and dry.   Neurological:      Mental Status: She is alert and oriented to person, place, and time.      Cranial Nerves: No cranial nerve deficit.   Psychiatric:         Mood and Affect: Mood and affect normal.         Judgment: Judgment normal.        Labs:    Imaging: I have personally reviewed the patient's available images and reports and summarized pertinent findings above in HPI.     Pathology: I have personally reviewed the patient's pathology reports and summarized pertinent " findings above in HPI.

## 2023-05-18 NOTE — TELEPHONE ENCOUNTER
Patient scheduled to start radiation treatments on May 29th to right lower lung lobe. Patient has a CRT-D to left chest wall and no pacer dependency noted during previous in clinic checks. Patient to be scheduled in clinic next week for device interrogation to ensure device is functioning appropriately prior to start of radiation treatments. Will forward to MD for further recommendations.

## 2023-05-18 NOTE — TELEPHONE ENCOUNTER
----- Message from Maria Ines Sutherland sent at 5/18/2023  4:10 PM CDT -----  Please see message below and let me know.  Thank you.  ----- Message -----  From: Herminio Romo MA  Sent: 5/18/2023   3:42 PM CDT  To: [facility] Arrhythmia Device Staff      ----- Message -----  From: Marion Cochran RN  Sent: 5/18/2023   9:19 AM CDT  To: [facility] Staff    We are preparing to treat this pt RLL of lung with radiation  Is she pacer dependent and does she need a magnet for treatment?

## 2023-05-18 NOTE — PROGRESS NOTES
Consult Note  Palliative Care      Consult Requested By: Dr. Javi Avina  Reason for Consult: symptom management and ACP      ASSESSMENT/PLAN:     Plan/Recommendations:  Diagnoses and all orders for this visit:    NSCLC of right lung  - patient following with Dr. Avina  - patient reports this is a recurrence   - plans to meet radiation oncology to discuss further radiation treatment  - currently on disease directed therapy    Encounter for palliative care/Advanced care planning  Advance Care Planning     Date: 05/19/2023         - patient decisional  - patient by herself in clinic today  - philosophy of Palliative Medicine and new patient folder given to and reviewed with patient today  - ACP documents completed and uploaded into EMR  - HCPOA: Elizabeth Paulino at [phone] and secondary HCPOAs are Abhinav Montoya at [phone] and Ramin Sierra at [phone]  - patient states today that she wants assistance with getting plans in place for her future  - patient has some previous interactions with hospice care  - she verbally stated that she wanted her daughter to be her decision maker  - ACP booklet given to and reviewed today  - [place]POST document introduced and reviewed today. Paper copy of [place]POST document given to patient to read/review so that can be discussed and potentially completed at next visit  - patient expressed concern about AICD at end of life. Discussed that when she is ready to transition to more comfort focused care, she will need to have AICD reprogrammed to prevent defibrillation at end of life. Patient is not ready to have AICD reprogrammed at this time.  - code discussion occurred today in setting of LaPOST document review; will follow up at next visit  - goals: life prolonging with limits to invasive measures; patient wants treatment as long as it can provide good quality of life as well. Patient would like to travel more.   - all questions/concerns were answered today    Dyspnea  -  patient reporting mild intermittent dyspnea  - mostly occurs with activity  - no need for pharmacologic intervention at this time  - tip sheet for shortness of breath in new patient folder for patient to review    Understanding of illness/Prognosis: patient with good understanding of illness; prognosis is overall poor but patient has excellent performance status at this time    Goals of care: life prolonging with limits to invasive measures    Follow up: ~ 6-8 weeks    Patient's encounter and above plan of care discussed with patient's oncologist and radiation oncologist    SUBJECTIVE:     History of Present Illness:  Patient is a 67 y.o. year old female with cardiomyopathy s/p AICD, asthma, right sided breast cancer, DM type II, HTN, HLD, and NSCLC of right lung presents to Palliative medicine for symptom management and GOC. Please see oncology notes for full details of oncologic history and treatment course.    05/18/2023:  CHAPIS CHANG reviewed and summarized: no prescriptions found    Patient presents today by herself. She reports no major symptoms except some mild dyspnea with exertion. Patient reporting that she is hoping to have radiation treatment but still go on her vacation in June. Patient has lots of questions regarding ACP documents and overall GOC.     Past Medical History:   Diagnosis Date    AICD (automatic cardioverter/defibrillator) present     Asthma     bronchitis in past    Breast cancer 2016    right    Cardiac pacemaker     Cardiomyopathy     COPD (chronic obstructive pulmonary disease)     Diabetes mellitus, type 2     Hyperglycemia     Hyperlipidemia     Hypertension     Malignant neoplasm of overlapping sites of female breast 2/12/2016    Nuclear sclerosis of both eyes 8/12/2020    Respiratory distress 3/12/2020     Past Surgical History:   Procedure Laterality Date    BIOPSY, WITH CT GUIDANCE Right 1/24/2023    Procedure: LUNG MASS BIOPSY, WITH CT GUIDANCE;  Surgeon: Yehuda Green MD;   Location: [facility] CATH LAB;  Service: Radiology;  Laterality: Right;    BREAST BIOPSY Right 2016    IDC    BREAST LUMPECTOMY Right     CARDIAC CATHETERIZATION Bilateral 2017    CARDIAC DEFIBRILLATOR PLACEMENT Left 08/10/2017    CARDIAC DEFIBRILLATOR PLACEMENT Left 2018     SECTION      x2    CHOLECYSTECTOMY      INSERTION OF TUNNELED CENTRAL VENOUS CATHETER (CVC) WITH SUBCUTANEOUS PORT Right 2020    Procedure: VTCMRLLRV-SRLM-E-CATH, RIGHT;  Surgeon: Josefa Caceres MD;  Location: [address];  Service: General;  Laterality: Right;  Port-a-cath placed to R. IJ    LUNG BIOPSY N/A 2020    Procedure: BIOPSY, LUNG;  Surgeon: [facility];  Location: [facility] OR;  Service: Radiology;  Laterality: N/A;  8AM START  RN PREOP 2020---COVID NEGATIVE    NAVIGATIONAL BRONCHOSCOPY N/A 10/13/2020    Procedure: BRONCHOSCOPY, NAVIGATIONAL;  Surgeon: Jamilah Weaver MD;  Location: [address];  Service: Pulmonary;  Laterality: N/A;    REVISION OF IMPLANTABLE CARDIOVERTER-DEFIBRILLATOR (ICD) ELECTRODE LEAD PLACEMENT N/A 6/15/2018    Procedure: REVISION-LEAD-ICD;  Surgeon: Javy Gates MD;  Location: [facility] CATH LAB;  Service: Cardiology;  Laterality: N/A;  LBBB, Bi-V ICD HIS Elom jordan MDT, Choice, MB, 3 Prep    ROBOT-ASSISTED LAPAROSCOPIC LYMPHADENECTOMY USING DA SALVADOR XI Right 10/23/2020    Procedure: XI ROBOTIC LYMPHADENECTOMY;  Surgeon: Ramo Tucker MD;  Location: [address];  Service: Thoracic;  Laterality: Right;    TUBAL LIGATION       Family History   Problem Relation Age of Onset    Kidney disease Mother     Cataracts Mother     Kidney disease Father     Cataracts Father     Clotting disorder Brother     No Known Problems Daughter     No Known Problems Son     No Known Problems Sister     No Known Problems Maternal Aunt     No Known Problems Maternal Uncle     No Known Problems Paternal Aunt     No Known Problems Paternal Uncle     Macular degeneration  Maternal Grandmother     No Known Problems Maternal Grandfather     No Known Problems Paternal Grandmother     No Known Problems Paternal Grandfather     Breast cancer Neg Hx     Ovarian cancer Neg Hx     Amblyopia Neg Hx     Blindness Neg Hx     Cancer Neg Hx     Diabetes Neg Hx     Glaucoma Neg Hx     Hypertension Neg Hx     Retinal detachment Neg Hx     Strabismus Neg Hx     Stroke Neg Hx     Thyroid disease Neg Hx      Review of patient's allergies indicates:   Allergen Reactions    Taxol [paclitaxel]      Hypersensitivity reaction to taxol, symptoms included shortness of breath, nausea, dizziness, flushing     Carboplatin Other (See Comments)     Itching and hives    Adhesive Rash     tegaderm burns and blistered skin       Medications:    Current Outpatient Medications:     ACCU-CHEK ALFRED PLUS TEST STRP Strp, USE TO TEST THREE TIMES DAILY, Disp: 200 strip, Rfl: 3    albuterol sulfate (PROAIR RESPICLICK) 90 mcg/actuation AePB, Inhale 2 puffs into the lungs every 4 (four) hours as needed. Rescue, Disp: 1 each, Rfl: 5    amLODIPine (NORVASC) 5 MG tablet, TAKE 1 TABLET BY MOUTH EVERY DAY (Patient taking differently: Take 5 mg by mouth 2 (two) times daily.), Disp: 90 tablet, Rfl: 3    atorvastatin (LIPITOR) 10 MG tablet, Take 1 tablet (10 mg total) by mouth once daily., Disp: 90 tablet, Rfl: 3    buPROPion (WELLBUTRIN XL) 150 MG TB24 tablet, Take 1 tablet (150 mg total) by mouth once daily., Disp: 90 tablet, Rfl: 0    cetirizine (ZYRTEC) 10 MG tablet, Take 10 mg by mouth every evening. , Disp: , Rfl:     fluticasone-salmeterol diskus inhaler 250-50 mcg, Inhale 1 puff into the lungs 2 (two) times daily. Controller, Disp: 180 each, Rfl: 0    losartan (COZAAR) 100 MG tablet, Take 1 tablet (100 mg total) by mouth once daily., Disp: 90 tablet, Rfl: 2    metFORMIN (GLUCOPHAGE) 500 MG tablet, Take 2 tablets (1,000 mg total) by mouth 2 (two) times daily with meals., Disp: 360 tablet, Rfl: 3    metoprolol succinate  (TOPROL-XL) 100 MG 24 hr tablet, Take 1 tablet (100 mg total) by mouth once daily., Disp: 90 tablet, Rfl: 3    multivitamin (THERAGRAN) per tablet, Take 1 tablet by mouth once daily., Disp: , Rfl:     mupirocin (BACTROBAN) 2 % ointment, Apply topically 3 (three) times daily., Disp: 30 g, Rfl: 1    OLANZapine (ZYPREXA) 5 MG tablet, Take 1 tablet (5 mg total) by mouth every evening. Take 1 tablet by mouth every evening on days 1-4 of each chemotherapy cycle, Disp: 30 tablet, Rfl: 1    ondansetron (ZOFRAN-ODT) 8 MG TbDL, Take 1 tablet (8 mg total) by mouth every 8 (eight) hours as needed (nausea/vomiting). Take 1 tablet (8 mg) by mouth every 8 hours as needed for nausea/vomiting., Disp: 60 tablet, Rfl: 5    pantoprazole (PROTONIX) 40 MG tablet, Take 1 tablet (40 mg total) by mouth once daily., Disp: 90 tablet, Rfl: 3    prochlorperazine (COMPAZINE) 10 MG tablet, Take 1 tablet (10 mg total) by mouth every 6 (six) hours as needed (nausea/vomiting - second choice)., Disp: 30 tablet, Rfl: 1    semaglutide (OZEMPIC) 0.25 mg or 0.5 mg(2 mg/1.5 mL) pen injector, Take 0.25 mg for 4 weeks, then increase to 0.5 mg weekly, Disp: 4 pen, Rfl: 3    sertraline (ZOLOFT) 100 MG tablet, TAKE 1 TABLET BY MOUTH EVERY EVENING., Disp: 90 tablet, Rfl: 3    tiotropium (SPIRIVA WITH HANDIHALER) 18 mcg inhalation capsule, INHALE 1 CAPSULE BY MOUTH EVERY DAY., Disp: 90 capsule, Rfl: 3    triamcinolone acetonide 0.1% (KENALOG) 0.1 % cream, Apply topically 2 (two) times daily., Disp: 453.6 g, Rfl: 2  No current facility-administered medications for this visit.    Facility-Administered Medications Ordered in Other Visits:     fentaNYL injection 25 mcg, 25 mcg, Intravenous, Q5 Min PRN, Keesha Martins MD    haloperidol lactate injection 0.5 mg, 0.5 mg, Intravenous, Once PRN, Keesha Martins MD    HYDROmorphone injection 0.2 mg, 0.2 mg, Intravenous, Q5 Min PRN, Keesha Martins MD    ondansetron injection 4 mg, 4 mg, Intravenous, Once PRN, Keesha Martins MD    sodium  chloride 0.9% flush 10 mL, 10 mL, Intravenous, PRN, Keesha Martins MD    OBJECTIVE:       ROS:  Review of Systems   Constitutional: Negative.  Negative for activity change, appetite change and unexpected weight change.   HENT: Negative.     Eyes: Negative.    Respiratory:  Positive for shortness of breath (with exertion). Negative for chest tightness.    Cardiovascular: Negative.  Negative for chest pain.   Gastrointestinal: Negative.  Negative for abdominal pain and nausea.   Genitourinary: Negative.    Musculoskeletal: Negative.  Negative for gait problem, joint swelling and myalgias.   Skin: Negative.    Neurological: Negative.  Negative for dizziness and syncope.   Psychiatric/Behavioral: Negative.  Negative for dysphoric mood and sleep disturbance. The patient is not nervous/anxious.    All other systems reviewed and are negative.    Review of Symptoms      Symptom Assessment (ESAS 0-10 Scale)  Pain:  0  Dyspnea:  2  Anxiety:  0  Nausea:  0  Depression:  0  Anorexia:  0  Fatigue:  0  Insomnia:  0  Restlessness:  0  Agitation:  0     CAM / Delirium:  Negative  Constipation:  Negative  Diarrhea:  Negative    Anxiety:  Is not nervous/anxious    Pain Assessment:  OME in 24 hours:  0  Location(s): none      Modified Hever Scale:  0.5    ECOG Performance Status ndGndrndanddndend:nd nd2nd Living Arrangements:  Lives with family    Psychosocial/Cultural:   See Palliative Psychosocial Note: No  Patient is . She has one son and one daughter. She has three grandchildren aged 23, 13, nd 10.  **Primary  to Follow**  Palliative Care  Consult: No    Spiritual:  F - Monica and Belief:  Yes  I - Importance:  Yes  C - Community:  No  A - Address in Care:  Needs met at this time    Advance Care Planning   Advance Directives:   Living Will: Yes        Copy on chart: Yes    LaPOST: No    Do Not Resuscitate Status: No    Medical Power of : Yes    Agent's Name:  Elizabeth Bob   Agent's Contact Number:   [phone]    Decision Making:  Patient answered questions  Goals of Care: The patient endorses that what is most important right now is to focus on quality of life, even if it means sacrificing a little time and improvement in condition but with limits to invasive therapies    Accordingly, we have decided that the best plan to meet the patient's goals includes continuing with treatment        Physical Exam:  Vitals: Temp: 97.7 °F (36.5 °C) (05/18/23 0754)  Pulse: 104 (05/18/23 0754)  BP: 132/77 (05/18/23 0754)  SpO2: (!) 93 % (05/18/23 0754)  Physical Exam  Vitals reviewed.   Constitutional:       General: She is not in acute distress.     Appearance: She is not toxic-appearing or diaphoretic.   HENT:      Head: Normocephalic and atraumatic.      Right Ear: External ear normal.      Left Ear: External ear normal.      Nose: Nose normal.      Mouth/Throat:      Mouth: Mucous membranes are moist.   Eyes:      General: No scleral icterus.        Right eye: No discharge.         Left eye: No discharge.   Neck:      Comments: Trachea midline  Pulmonary:      Effort: Pulmonary effort is normal. No respiratory distress.   Abdominal:      General: Abdomen is flat. There is no distension.      Tenderness: There is no abdominal tenderness.   Musculoskeletal:         General: No swelling, deformity or signs of injury.      Cervical back: Normal range of motion.   Skin:     General: Skin is dry.      Coloration: Skin is not jaundiced.      Findings: No rash.   Neurological:      General: No focal deficit present.      Mental Status: She is alert and oriented to person, place, and time.      Gait: Gait normal.   Psychiatric:         Mood and Affect: Mood normal.         Behavior: Behavior normal.         Thought Content: Thought content normal.         Judgment: Judgment normal.       Labs:  CBC:   WBC   Date Value Ref Range Status   05/01/2023 7.86 3.90 - 12.70 K/uL Final     Hemoglobin   Date Value Ref Range Status  "  05/01/2023 11.8 (L) 12.0 - 16.0 g/dL Final     Hematocrit   Date Value Ref Range Status   05/01/2023 38.0 37.0 - 48.5 % Final     MCV   Date Value Ref Range Status   05/01/2023 89 82 - 98 fL Final     Platelets   Date Value Ref Range Status   05/01/2023 250 150 - 450 K/uL Final       LFT:   Lab Results   Component Value Date    AST 20 05/01/2023    ALKPHOS 57 05/01/2023    BILITOT 0.3 05/01/2023       Albumin:   Albumin   Date Value Ref Range Status   05/01/2023 3.6 3.5 - 5.2 g/dL Final     Protein:   Total Protein   Date Value Ref Range Status   05/01/2023 7.7 6.0 - 8.4 g/dL Final       Radiology:I have reviewed all pertinent imaging results/findings within the past 24 hours.    04/27/2023 CT C/A/P: "Stable post radiation changes in the right middle and lower lobes for squamous cell carcinoma, noting that recent biopsy demonstrated residual tumor in this location. No lymphadenopathy. New ground-glass and consolidative opacities scattered throughout both lungs.  An inflammatory or infectious process is favored.  Malignancy is less likely given rapid development.  Attention on expected follow-up. No abdominopelvic metastases. Other findings as described."    A total of 30 min was spent on advance care planning, goals of care discussion, emotional support, formulating and communicating prognosis and goals of care, exploring burden/benefit of various approaches of treatment. This discussion occurred on a fully voluntary basis with the verbal consent of the patient and/or family    Signature: Mila Murry MD      "

## 2023-05-19 ENCOUNTER — TELEPHONE (OUTPATIENT)
Dept: RADIATION ONCOLOGY | Facility: CLINIC | Age: 68
End: 2023-05-19
Payer: MEDICARE

## 2023-05-19 NOTE — TELEPHONE ENCOUNTER
----- Message from Maria Ines Sutherland sent at 5/18/2023  4:32 PM CDT -----  Pavel Schultz, we booked the patient to come in to have her device checked prior to starting radiation.  She will come in on Monday and we will let her know what needs to be done.  No magnet is needed for radiation.   ----- Message -----  From: Trudy Jacob RN  Sent: 5/18/2023   4:24 PM CDT  To: Maria Ines Sutherland    Can you please schedule patient to be seen in clinic next week (her radiation treatments start on the 29th of May) so we can just check her device and make sure everything is functioning WNL? Ill send Dr. Gates a message and see if he has any further recs.    Thank you!!  Joao  ----- Message -----  From: Maria Ines Sutherland  Sent: 5/18/2023   4:11 PM CDT  To: Trudy Jacob RN    Please see message below and let me know.  Thank you.  ----- Message -----  From: Herminio Romo MA  Sent: 5/18/2023   3:42 PM CDT  To: [facility] Arrhythmia Device Staff      ----- Message -----  From: Marion Cochran RN  Sent: 5/18/2023   9:19 AM CDT  To: [facility] Staff    We are preparing to treat this pt RLL of lung with radiation  Is she pacer dependent and does she need a magnet for treatment?

## 2023-05-22 ENCOUNTER — CLINICAL SUPPORT (OUTPATIENT)
Dept: CARDIOLOGY | Facility: HOSPITAL | Age: 68
End: 2023-05-22
Attending: INTERNAL MEDICINE
Payer: MEDICARE

## 2023-05-22 ENCOUNTER — OFFICE VISIT (OUTPATIENT)
Dept: HEMATOLOGY/ONCOLOGY | Facility: CLINIC | Age: 68
End: 2023-05-22
Payer: MEDICARE

## 2023-05-22 ENCOUNTER — TELEPHONE (OUTPATIENT)
Dept: ELECTROPHYSIOLOGY | Facility: CLINIC | Age: 68
End: 2023-05-22
Payer: MEDICARE

## 2023-05-22 VITALS
RESPIRATION RATE: 18 BRPM | TEMPERATURE: 98 F | BODY MASS INDEX: 34.16 KG/M2 | OXYGEN SATURATION: 98 % | HEIGHT: 62 IN | WEIGHT: 185.63 LBS | HEART RATE: 100 BPM | SYSTOLIC BLOOD PRESSURE: 122 MMHG | DIASTOLIC BLOOD PRESSURE: 76 MMHG

## 2023-05-22 DIAGNOSIS — Z95.810 CARDIAC RESYNCHRONIZATION THERAPY DEFIBRILLATOR (CRT-D) IN PLACE: ICD-10-CM

## 2023-05-22 DIAGNOSIS — I42.0 DILATED CARDIOMYOPATHY: ICD-10-CM

## 2023-05-22 DIAGNOSIS — I10 ESSENTIAL HYPERTENSION: ICD-10-CM

## 2023-05-22 DIAGNOSIS — C34.91 NSCLC OF RIGHT LUNG: Primary | ICD-10-CM

## 2023-05-22 DIAGNOSIS — I44.7 LEFT BUNDLE-BRANCH BLOCK: ICD-10-CM

## 2023-05-22 DIAGNOSIS — J70.0 RADIATION PNEUMONITIS: ICD-10-CM

## 2023-05-22 DIAGNOSIS — Z85.3 HISTORY OF BREAST CANCER IN FEMALE: ICD-10-CM

## 2023-05-22 DIAGNOSIS — I49.8 OTHER CARDIAC ARRHYTHMIA: ICD-10-CM

## 2023-05-22 PROCEDURE — 3052F HG A1C>EQUAL 8.0%<EQUAL 9.0%: CPT | Mod: HCNC,CPTII,S$GLB, | Performed by: INTERNAL MEDICINE

## 2023-05-22 PROCEDURE — 3074F SYST BP LT 130 MM HG: CPT | Mod: HCNC,CPTII,S$GLB, | Performed by: INTERNAL MEDICINE

## 2023-05-22 PROCEDURE — 1159F MED LIST DOCD IN RCRD: CPT | Mod: HCNC,CPTII,S$GLB, | Performed by: INTERNAL MEDICINE

## 2023-05-22 PROCEDURE — 3078F DIAST BP <80 MM HG: CPT | Mod: HCNC,CPTII,S$GLB, | Performed by: INTERNAL MEDICINE

## 2023-05-22 PROCEDURE — 1126F AMNT PAIN NOTED NONE PRSNT: CPT | Mod: HCNC,CPTII,S$GLB, | Performed by: INTERNAL MEDICINE

## 2023-05-22 PROCEDURE — 1159F PR MEDICATION LIST DOCUMENTED IN MEDICAL RECORD: ICD-10-PCS | Mod: HCNC,CPTII,S$GLB, | Performed by: INTERNAL MEDICINE

## 2023-05-22 PROCEDURE — 4010F ACE/ARB THERAPY RXD/TAKEN: CPT | Mod: HCNC,CPTII,S$GLB, | Performed by: INTERNAL MEDICINE

## 2023-05-22 PROCEDURE — 99215 PR OFFICE/OUTPT VISIT, EST, LEVL V, 40-54 MIN: ICD-10-PCS | Mod: HCNC,S$GLB,, | Performed by: INTERNAL MEDICINE

## 2023-05-22 PROCEDURE — 3008F BODY MASS INDEX DOCD: CPT | Mod: HCNC,CPTII,S$GLB, | Performed by: INTERNAL MEDICINE

## 2023-05-22 PROCEDURE — 99215 OFFICE O/P EST HI 40 MIN: CPT | Mod: HCNC,S$GLB,, | Performed by: INTERNAL MEDICINE

## 2023-05-22 PROCEDURE — 3288F FALL RISK ASSESSMENT DOCD: CPT | Mod: HCNC,CPTII,S$GLB, | Performed by: INTERNAL MEDICINE

## 2023-05-22 PROCEDURE — 3074F PR MOST RECENT SYSTOLIC BLOOD PRESSURE < 130 MM HG: ICD-10-PCS | Mod: HCNC,CPTII,S$GLB, | Performed by: INTERNAL MEDICINE

## 2023-05-22 PROCEDURE — 1101F PT FALLS ASSESS-DOCD LE1/YR: CPT | Mod: HCNC,CPTII,S$GLB, | Performed by: INTERNAL MEDICINE

## 2023-05-22 PROCEDURE — 3078F PR MOST RECENT DIASTOLIC BLOOD PRESSURE < 80 MM HG: ICD-10-PCS | Mod: HCNC,CPTII,S$GLB, | Performed by: INTERNAL MEDICINE

## 2023-05-22 PROCEDURE — 1101F PR PT FALLS ASSESS DOC 0-1 FALLS W/OUT INJ PAST YR: ICD-10-PCS | Mod: HCNC,CPTII,S$GLB, | Performed by: INTERNAL MEDICINE

## 2023-05-22 PROCEDURE — 3052F PR MOST RECENT HEMOGLOBIN A1C LEVEL 8.0 - < 9.0%: ICD-10-PCS | Mod: HCNC,CPTII,S$GLB, | Performed by: INTERNAL MEDICINE

## 2023-05-22 PROCEDURE — 99214 OFFICE O/P EST MOD 30 MIN: CPT | Mod: HCNC | Performed by: INTERNAL MEDICINE

## 2023-05-22 PROCEDURE — 1157F PR ADVANCE CARE PLAN OR EQUIV PRESENT IN MEDICAL RECORD: ICD-10-PCS | Mod: HCNC,CPTII,S$GLB, | Performed by: INTERNAL MEDICINE

## 2023-05-22 PROCEDURE — 93284 PRGRMG EVAL IMPLANTABLE DFB: CPT | Mod: HCNC

## 2023-05-22 PROCEDURE — 93284 PRGRMG EVAL IMPLANTABLE DFB: CPT | Mod: 26,HCNC,, | Performed by: INTERNAL MEDICINE

## 2023-05-22 PROCEDURE — 1157F ADVNC CARE PLAN IN RCRD: CPT | Mod: HCNC,CPTII,S$GLB, | Performed by: INTERNAL MEDICINE

## 2023-05-22 PROCEDURE — 3008F PR BODY MASS INDEX (BMI) DOCUMENTED: ICD-10-PCS | Mod: HCNC,CPTII,S$GLB, | Performed by: INTERNAL MEDICINE

## 2023-05-22 PROCEDURE — 3288F PR FALLS RISK ASSESSMENT DOCUMENTED: ICD-10-PCS | Mod: HCNC,CPTII,S$GLB, | Performed by: INTERNAL MEDICINE

## 2023-05-22 PROCEDURE — 4010F PR ACE/ARB THEARPY RXD/TAKEN: ICD-10-PCS | Mod: HCNC,CPTII,S$GLB, | Performed by: INTERNAL MEDICINE

## 2023-05-22 PROCEDURE — 93284 CARDIAC DEVICE CHECK - IN CLINIC & HOSPITAL: ICD-10-PCS | Mod: 26,HCNC,, | Performed by: INTERNAL MEDICINE

## 2023-05-22 PROCEDURE — 1126F PR PAIN SEVERITY QUANTIFIED, NO PAIN PRESENT: ICD-10-PCS | Mod: HCNC,CPTII,S$GLB, | Performed by: INTERNAL MEDICINE

## 2023-05-22 PROCEDURE — 99999 PR PBB SHADOW E&M-EST. PATIENT-LVL IV: CPT | Mod: PBBFAC,HCNC,, | Performed by: INTERNAL MEDICINE

## 2023-05-22 PROCEDURE — 99999 PR PBB SHADOW E&M-EST. PATIENT-LVL IV: ICD-10-PCS | Mod: PBBFAC,HCNC,, | Performed by: INTERNAL MEDICINE

## 2023-05-22 NOTE — Clinical Note
Hey,  She has a little bit of GGOs on CT scan that the radiologist think are inflammatory. I am ok with just holding immunotherapy until next cycle while she gets radiation as to not increase the risk of further pneumonitis.  I will see her to restart 7/3.  Thanks Lucien

## 2023-05-22 NOTE — PROGRESS NOTES
ONCOLOGY FOLLOW UP VISIT.     Reason for visit: Toxicity visit on 9LA for recurrent stage III NSCLC    Cancer/Stage/TNM:    Cancer Staging   NSCLC of right lung  Staging form: Lung, AJCC 8th Edition  - Clinical stage from 9/29/2020: Stage IIIA (cT3, cN1, cM0) - Signed by Ramo Tucker MD on 10/24/2020         Oncology History   NSCLC of right lung   9/29/2020 Cancer Staged    Staging form: Lung, AJCC 8th Edition  - Clinical stage from 9/29/2020: Stage IIIA (cT3, cN1, cM0)     10/14/2020 Initial Diagnosis    NSCLC of right lung     11/17/2020 - 12/30/2020 Radiation Therapy    Treating physician: Harvinder Vásquez  Technique  Energy  Dose/Fx (Gy)  #Fx  Total Dose (Gy)    RLL Lung  VMAT  6X  2  30 / 30  60         2/6/2023 -  Chemotherapy    Treatment Summary   Plan Name: OP NSCLC NIVOLUMAB 360 MG D1 & D22 WITH IPILIMUMAB 1 MG/KG Q6W PLUS CARBOPLATIN (AUC) PACLITAXEL Q3W X2 DOSES  Treatment Goal: Control  Status: Active  Start Date: 2/6/2023  End Date: 1/20/2025 (Planned)  Provider: Javi Avina MD  Chemotherapy: CARBOplatin (PARAPLATIN) 525 mg in sodium chloride 0.9% 337.5 mL chemo infusion, 525 mg, Intravenous, Clinic/HOD 1 time, 1 of 1 cycle  Administration: 525 mg (2/6/2023), 490 mg (2/27/2023)  PACLitaxeL (TAXOL) 200 mg/m2 = 396 mg in sodium chloride 0.9% 500 mL chemo infusion, 200 mg/m2 = 396 mg (100 % of original dose 200 mg/m2), Intravenous, Clinic/HOD 1 time, 1 of 1 cycle  Dose modification: 200 mg/m2 (original dose 200 mg/m2, Cycle 1), 200 mg/m2 (original dose 200 mg/m2, Cycle 1)  Administration: 396 mg (2/6/2023), 396 mg (2/27/2023)          Interval History:   Today, patient reports feeling well overall. Denies significant SOB, but did use rescue inhaler after long walk today in the heat. She has been active, energy is improved.     ROS:  Review of Systems   Constitutional:  Negative for chills, fever and weight loss.   HENT:  Negative for hearing loss, nosebleeds and sore throat.    Eyes:   Negative for blurred vision and double vision.   Respiratory:  Negative for cough, hemoptysis and shortness of breath.    Cardiovascular:  Negative for chest pain and leg swelling.   Gastrointestinal:  Positive for diarrhea (occassional (d/t gallbladder removal)). Negative for abdominal pain, blood in stool, heartburn, nausea and vomiting.   Genitourinary:  Negative for dysuria, frequency and hematuria.   Musculoskeletal:  Negative for back pain, joint pain and myalgias.   Skin:  Negative for itching and rash.   Neurological:  Negative for dizziness, tingling, sensory change, speech change and headaches.   Endo/Heme/Allergies:  Does not bruise/bleed easily.   Psychiatric/Behavioral:  The patient is not nervous/anxious.         A complete 12-point review of systems was reviewed and is negative except as mentioned above.     Past Medical History:   Past Medical History:   Diagnosis Date    AICD (automatic cardioverter/defibrillator) present     Asthma     bronchitis in past    Breast cancer 2016    right    Cardiac pacemaker     Cardiomyopathy     COPD (chronic obstructive pulmonary disease)     Diabetes mellitus, type 2     Hyperglycemia     Hyperlipidemia     Hypertension     Malignant neoplasm of overlapping sites of female breast 2/12/2016    Nuclear sclerosis of both eyes 8/12/2020    Respiratory distress 3/12/2020        Allergies:   Review of patient's allergies indicates:   Allergen Reactions    Taxol [paclitaxel]      Hypersensitivity reaction to taxol, symptoms included shortness of breath, nausea, dizziness, flushing     Carboplatin Other (See Comments)     Itching and hives    Adhesive Rash     tegaderm burns and blistered skin        Medications:   Current Outpatient Medications   Medication Sig Dispense Refill    ACCU-CHEK ALFRED PLUS TEST STRP Strp USE TO TEST THREE TIMES DAILY 200 strip 3    albuterol sulfate (PROAIR RESPICLICK) 90 mcg/actuation AePB Inhale 2 puffs into the lungs every 4 (four) hours  as needed. Rescue 1 each 5    amLODIPine (NORVASC) 5 MG tablet TAKE 1 TABLET BY MOUTH EVERY DAY (Patient taking differently: Take 5 mg by mouth 2 (two) times daily.) 90 tablet 3    atorvastatin (LIPITOR) 10 MG tablet Take 1 tablet (10 mg total) by mouth once daily. 90 tablet 3    buPROPion (WELLBUTRIN XL) 150 MG TB24 tablet Take 1 tablet (150 mg total) by mouth once daily. 90 tablet 0    cetirizine (ZYRTEC) 10 MG tablet Take 10 mg by mouth every evening.       fluticasone-salmeterol diskus inhaler 250-50 mcg Inhale 1 puff into the lungs 2 (two) times daily. Controller 180 each 0    losartan (COZAAR) 100 MG tablet Take 1 tablet (100 mg total) by mouth once daily. 90 tablet 2    metFORMIN (GLUCOPHAGE) 500 MG tablet Take 2 tablets (1,000 mg total) by mouth 2 (two) times daily with meals. 360 tablet 3    metoprolol succinate (TOPROL-XL) 100 MG 24 hr tablet Take 1 tablet (100 mg total) by mouth once daily. 90 tablet 3    multivitamin (THERAGRAN) per tablet Take 1 tablet by mouth once daily.      mupirocin (BACTROBAN) 2 % ointment Apply topically 3 (three) times daily. 30 g 1    OLANZapine (ZYPREXA) 5 MG tablet Take 1 tablet (5 mg total) by mouth every evening. Take 1 tablet by mouth every evening on days 1-4 of each chemotherapy cycle 30 tablet 1    ondansetron (ZOFRAN-ODT) 8 MG TbDL Take 1 tablet (8 mg total) by mouth every 8 (eight) hours as needed (nausea/vomiting). Take 1 tablet (8 mg) by mouth every 8 hours as needed for nausea/vomiting. 60 tablet 5    pantoprazole (PROTONIX) 40 MG tablet Take 1 tablet (40 mg total) by mouth once daily. 90 tablet 3    prochlorperazine (COMPAZINE) 10 MG tablet Take 1 tablet (10 mg total) by mouth every 6 (six) hours as needed (nausea/vomiting - second choice). 30 tablet 1    semaglutide (OZEMPIC) 0.25 mg or 0.5 mg(2 mg/1.5 mL) pen injector Take 0.25 mg for 4 weeks, then increase to 0.5 mg weekly 4 pen 3    sertraline (ZOLOFT) 100 MG tablet TAKE 1 TABLET BY MOUTH EVERY EVENING. 90  "tablet 3    tiotropium (SPIRIVA WITH HANDIHALER) 18 mcg inhalation capsule INHALE 1 CAPSULE BY MOUTH EVERY DAY. 90 capsule 3    triamcinolone acetonide 0.1% (KENALOG) 0.1 % cream Apply topically 2 (two) times daily. 453.6 g 2     No current facility-administered medications for this visit.     Facility-Administered Medications Ordered in Other Visits   Medication Dose Route Frequency Provider Last Rate Last Admin    fentaNYL injection 25 mcg  25 mcg Intravenous Q5 Min PRN Keesha Martins MD        haloperidol lactate injection 0.5 mg  0.5 mg Intravenous Once PRN Keesha Martins MD        HYDROmorphone injection 0.2 mg  0.2 mg Intravenous Q5 Min PRN Keesha Martins MD        ondansetron injection 4 mg  4 mg Intravenous Once PRN Keesha Martins MD        sodium chloride 0.9% flush 10 mL  10 mL Intravenous PRN Keesha Martins MD            Physical Exam:   /76 (BP Location: Left arm, Patient Position: Sitting, BP Method: Medium (Automatic))   Pulse 100   Temp 98.3 °F (36.8 °C) (Oral)   Resp 18   Ht 5' 2" (1.575 m)   Wt 84.2 kg (185 lb 10 oz)   LMP  (LMP Unknown)   SpO2 98%   BMI 33.95 kg/m²      ECOG Performance Status: (foot note - ECOG PS provided by Eastern Cooperative Oncology Group) 0 - Asymptomatic    Physical Exam  Vitals and nursing note reviewed.   Constitutional:       Appearance: Normal appearance. She is well-developed and normal weight.   HENT:      Head: Normocephalic and atraumatic.   Eyes:      Conjunctiva/sclera: Conjunctivae normal.      Pupils: Pupils are equal, round, and reactive to light.   Pulmonary:      Effort: Pulmonary effort is normal. No respiratory distress.   Abdominal:      General: There is no distension.      Palpations: Abdomen is soft.   Musculoskeletal:         General: No swelling. Normal range of motion.      Cervical back: Normal range of motion and neck supple.      Left ankle: Swelling (trace) present.   Lymphadenopathy:      Cervical: No cervical adenopathy.   Skin:     General: Skin is " warm and dry.      Findings: Rash (scalp) present.      Comments: Honey crusted lesions to scalp   Neurological:      General: No focal deficit present.      Mental Status: She is alert and oriented to person, place, and time.   Psychiatric:         Mood and Affect: Mood and affect normal.         Behavior: Behavior normal.               Labs:   Recent Results (from the past 48 hour(s))   CBC W/ AUTO DIFFERENTIAL    Collection Time: 05/22/23 12:07 PM   Result Value Ref Range    WBC 9.27 3.90 - 12.70 K/uL    RBC 4.07 4.00 - 5.40 M/uL    Hemoglobin 11.0 (L) 12.0 - 16.0 g/dL    Hematocrit 35.8 (L) 37.0 - 48.5 %    MCV 88 82 - 98 fL    MCH 27.0 27.0 - 31.0 pg    MCHC 30.7 (L) 32.0 - 36.0 g/dL    RDW 14.9 (H) 11.5 - 14.5 %    Platelets 296 150 - 450 K/uL    MPV 11.6 9.2 - 12.9 fL    Immature Granulocytes 0.9 (H) 0.0 - 0.5 %    Gran # (ANC) 6.6 1.8 - 7.7 K/uL    Immature Grans (Abs) 0.08 (H) 0.00 - 0.04 K/uL    Lymph # 1.3 1.0 - 4.8 K/uL    Mono # 1.0 0.3 - 1.0 K/uL    Eos # 0.3 0.0 - 0.5 K/uL    Baso # 0.07 0.00 - 0.20 K/uL    nRBC 0 0 /100 WBC    Gran % 71.6 38.0 - 73.0 %    Lymph % 13.5 (L) 18.0 - 48.0 %    Mono % 10.2 4.0 - 15.0 %    Eosinophil % 3.0 0.0 - 8.0 %    Basophil % 0.8 0.0 - 1.9 %    Differential Method Automated    CMP    Collection Time: 05/22/23 12:07 PM   Result Value Ref Range    Sodium 141 136 - 145 mmol/L    Potassium 5.3 (H) 3.5 - 5.1 mmol/L    Chloride 107 95 - 110 mmol/L    CO2 22 (L) 23 - 29 mmol/L    Glucose 101 70 - 110 mg/dL    BUN 17 8 - 23 mg/dL    Creatinine 0.9 0.5 - 1.4 mg/dL    Calcium 10.4 8.7 - 10.5 mg/dL    Total Protein 7.8 6.0 - 8.4 g/dL    Albumin 3.3 (L) 3.5 - 5.2 g/dL    Total Bilirubin 0.3 0.1 - 1.0 mg/dL    Alkaline Phosphatase 63 55 - 135 U/L    AST 21 10 - 40 U/L    ALT 16 10 - 44 U/L    Anion Gap 12 8 - 16 mmol/L    eGFR >60.0 >60 mL/min/1.73 m^2   TSH    Collection Time: 05/22/23 12:07 PM   Result Value Ref Range    TSH 1.779 0.400 - 4.000 uIU/mL   FREE T4    Collection  Time: 05/22/23 12:07 PM   Result Value Ref Range    Free T4 0.88 0.71 - 1.51 ng/dL            Imaging:   Cardiac device check - Remote  Additional Comments  REMOTE Interrogation Report:    Presenting Egram Demonstrates: AS/BiV Paced  Autocapture Algorithms: RA and RV are on, LV is off  Device function WNL  RA pacing  0%, V pacing  98%  Atrial arrhythmias: none  Ventricular arrhythmias: none  Battery Longevity/Status: 15 months  Resume remote transmissions in 3 months.  Return to device clinic in October 2023    Report prepared by:  Maria Ines JONES.            Diagnoses:       1. NSCLC of right lung    2. Radiation pneumonitis    3. History of breast cancer in female    4. Dilated cardiomyopathy    5. Left bundle-branch block    6. Cardiac resynchronization therapy defibrillator (CRT-D) in place    7. Essential hypertension        Assessment and Plan:         1. Recurrent NSCLC  Plan is for definitive chemoRT, will need re-staging scans prior.  Reviewed with patient in detail her diagnosis, stage, treatment options, and prognosis (3 year OS 66% vs. 43.5% without durvalumab) with standard of care chemoRT followed by maintenance immunotherapy.  Patient has consented for [id number] clinical trial, a randomized control trial evaluating combination chemoRT + durvalumab followed by maintenance vs. SOC chemoRT -> maintenance.  CT scans 7/2021 with CR.  - patient randomized on [id number] to SOC arm (Imfinzi) - completed 12/2021.    - CT scan 12/2022 with progressing pulmonary nodule in LLL, still with stable disease in RLL consolidation. PET scan also showing enlarging right lower lobe mass with increased hypermetabolic activity concerning for recurrence. Will present her case at the next thoracic tumor board to discuss biopsy and possible treatment options.   - Biopsy of lung mass consistent with SCC. Initiated on 9LA. Initial re-staging scans with stable disease.   - With mild inflammation on CT chest, will hold IO during consolidative RT  (5/29-6/16) to avoid re-inducing radiation pneumonitis. Will have patient return to restart 7/3. Re-staging scans in late July or early August.    2. Grade 2 initially, now resolved. There was increase in GGOs on recent CT, but patient is asymptomatic. Continue COPD maintenance inhalers and she has duonebs prn. Holding IO during RT to chest     3. 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      4-7.  Stable, follows with cardiology. AICD placed, but patient now has recovered EF and only takes lasix prn.  NYHA class I.    Patient is in agreement with the proposed treatment plan. All questions were answered to the patient's satisfaction. Pt knows to call clinic if anything is needed before the next clinic visit.    MDM includes:    - Acute or chronic illness or injury that poses a threat to life or bodily function  - Independent review and explanation of 2 results from unique tests  - Discussion of management and ordering 2 unique tests  - Extensive discussion of treatment and management  - Prescription drug management  - Drug therapy requiring intensive monitoring for toxicity    Javi Avina M.D.  Hematology/Oncology Attending  Director [facility] Cancer Therapies Program  [facility]              Route Chart for Scheduling    Med Onc Chart Routing      Follow up with physician Other. 7/3 with labs prior to next cycle of Yervoy and Opdivo   Follow up with DANIEL    Infusion scheduling note    Injection scheduling note    Labs CBC, CMP, TSH and free T4   Scheduling: prior to infusion  Preferred lab:  Lab interval:     Imaging    Pharmacy appointment    Other referrals         Treatment Plan Information   OP NSCLC NIVOLUMAB 360 MG D1 & D22 WITH IPILIMUMAB 1 MG/KG Q6W PLUS CARBOPLATIN (AUC) PACLITAXEL Q3W X2 DOSES   Javi Avina MD   Upcoming Treatment Dates - OP NSCLC NIVOLUMAB 360 MG D1 & D22 WITH IPILIMUMAB 1 MG/KG Q6W PLUS CARBOPLATIN (AUC) PACLITAXEL Q3W X2 DOSES    7/3/2023        Immunotherapy       nivolumab 360 mg in sodium chloride 0.9% 86 mL infusion       ipilimumab (YERVOY) 1 mg/kg = 91 mg in sodium chloride 0.9% 68.2 mL chemo infusion  7/24/2023       Immunotherapy       nivolumab 360 mg in sodium chloride 0.9% 86 mL infusion  8/14/2023       Immunotherapy       nivolumab 360 mg in sodium chloride 0.9% 86 mL infusion       ipilimumab (YERVOY) 1 mg/kg = 91 mg in sodium chloride 0.9% 68.2 mL chemo infusion  9/4/2023       Immunotherapy       nivolumab 360 mg in sodium chloride 0.9% 86 mL infusion

## 2023-05-24 PROCEDURE — 77370 RADIATION PHYSICS CONSULT: CPT | Mod: HCNC | Performed by: RADIOLOGY

## 2023-05-24 NOTE — TELEPHONE ENCOUNTER
Received information re: vendor recommendations for patient with a CRT-D going for radiation therapy. [employer] recommends securing a magnet over device site to suspend tachyarrhythmia detection during treatment. After completing treatment, remove the magnet to restore device to initial settings.

## 2023-05-25 ENCOUNTER — LAB VISIT (OUTPATIENT)
Dept: LAB | Facility: HOSPITAL | Age: 68
End: 2023-05-25
Attending: FAMILY MEDICINE
Payer: MEDICARE

## 2023-05-25 DIAGNOSIS — E11.9 TYPE 2 DIABETES MELLITUS WITHOUT COMPLICATION, WITHOUT LONG-TERM CURRENT USE OF INSULIN: ICD-10-CM

## 2023-05-25 DIAGNOSIS — E66.01 CLASS 2 SEVERE OBESITY DUE TO EXCESS CALORIES WITH SERIOUS COMORBIDITY AND BODY MASS INDEX (BMI) OF 36.0 TO 36.9 IN ADULT: ICD-10-CM

## 2023-05-25 DIAGNOSIS — I10 BENIGN ESSENTIAL HTN: ICD-10-CM

## 2023-05-25 LAB
ALBUMIN SERPL BCP-MCNC: 3.1 G/DL (ref 3.5–5.2)
ALP SERPL-CCNC: 58 U/L (ref 55–135)
ALT SERPL W/O P-5'-P-CCNC: 19 U/L (ref 10–44)
ANION GAP SERPL CALC-SCNC: 10 MMOL/L (ref 8–16)
AST SERPL-CCNC: 19 U/L (ref 10–40)
BILIRUB SERPL-MCNC: 0.3 MG/DL (ref 0.1–1)
BUN SERPL-MCNC: 14 MG/DL (ref 8–23)
CALCIUM SERPL-MCNC: 10.2 MG/DL (ref 8.7–10.5)
CHLORIDE SERPL-SCNC: 104 MMOL/L (ref 95–110)
CHOLEST SERPL-MCNC: 98 MG/DL (ref 120–199)
CHOLEST/HDLC SERPL: 3.2 {RATIO} (ref 2–5)
CO2 SERPL-SCNC: 25 MMOL/L (ref 23–29)
CREAT SERPL-MCNC: 1 MG/DL (ref 0.5–1.4)
EST. GFR  (NO RACE VARIABLE): >60 ML/MIN/1.73 M^2
ESTIMATED AVG GLUCOSE: 120 MG/DL (ref 68–131)
GLUCOSE SERPL-MCNC: 120 MG/DL (ref 70–110)
HBA1C MFR BLD: 5.8 % (ref 4–5.6)
HDLC SERPL-MCNC: 31 MG/DL (ref 40–75)
HDLC SERPL: 31.6 % (ref 20–50)
LDLC SERPL CALC-MCNC: 30.6 MG/DL (ref 63–159)
NONHDLC SERPL-MCNC: 67 MG/DL
POTASSIUM SERPL-SCNC: 4.7 MMOL/L (ref 3.5–5.1)
PROT SERPL-MCNC: 7.5 G/DL (ref 6–8.4)
SODIUM SERPL-SCNC: 139 MMOL/L (ref 136–145)
TRIGL SERPL-MCNC: 182 MG/DL (ref 30–150)

## 2023-05-25 PROCEDURE — 80053 COMPREHEN METABOLIC PANEL: CPT | Mod: HCNC | Performed by: FAMILY MEDICINE

## 2023-05-25 PROCEDURE — 80061 LIPID PANEL: CPT | Mod: HCNC | Performed by: FAMILY MEDICINE

## 2023-05-25 PROCEDURE — 83036 HEMOGLOBIN GLYCOSYLATED A1C: CPT | Mod: HCNC | Performed by: FAMILY MEDICINE

## 2023-05-25 PROCEDURE — 36415 COLL VENOUS BLD VENIPUNCTURE: CPT | Mod: HCNC,PO | Performed by: FAMILY MEDICINE

## 2023-05-26 ENCOUNTER — PATIENT MESSAGE (OUTPATIENT)
Dept: FAMILY MEDICINE | Facility: CLINIC | Age: 68
End: 2023-05-26
Payer: MEDICARE

## 2023-05-26 ENCOUNTER — PES CALL (OUTPATIENT)
Dept: ADMINISTRATIVE | Facility: CLINIC | Age: 68
End: 2023-05-26
Payer: MEDICARE

## 2023-05-26 PROCEDURE — 77301 RADIOTHERAPY DOSE PLAN IMRT: CPT | Mod: 26,HCNC,, | Performed by: RADIOLOGY

## 2023-05-26 PROCEDURE — 77301 RADIOTHERAPY DOSE PLAN IMRT: CPT | Mod: TC,HCNC | Performed by: RADIOLOGY

## 2023-05-26 PROCEDURE — 77301 PR  INTEN MOD RADIOTHER PLAN W/DOSE VOL HIST: ICD-10-PCS | Mod: 26,HCNC,, | Performed by: RADIOLOGY

## 2023-05-27 ENCOUNTER — PATIENT MESSAGE (OUTPATIENT)
Dept: HEMATOLOGY/ONCOLOGY | Facility: CLINIC | Age: 68
End: 2023-05-27
Payer: MEDICARE

## 2023-05-27 ENCOUNTER — HOSPITAL ENCOUNTER (EMERGENCY)
Facility: HOSPITAL | Age: 68
Discharge: HOME | End: 2023-05-27
Attending: EMERGENCY MEDICINE
Payer: MEDICARE

## 2023-05-27 ENCOUNTER — NURSE TRIAGE (OUTPATIENT)
Dept: ADMINISTRATIVE | Facility: CLINIC | Age: 68
End: 2023-05-27
Payer: MEDICARE

## 2023-05-27 ENCOUNTER — PATIENT MESSAGE (OUTPATIENT)
Dept: RADIATION ONCOLOGY | Facility: CLINIC | Age: 68
End: 2023-05-27
Payer: MEDICARE

## 2023-05-27 VITALS
BODY MASS INDEX: 32.57 KG/M2 | HEART RATE: 100 BPM | SYSTOLIC BLOOD PRESSURE: 122 MMHG | WEIGHT: 177 LBS | DIASTOLIC BLOOD PRESSURE: 67 MMHG | OXYGEN SATURATION: 96 % | HEIGHT: 62 IN | TEMPERATURE: 98 F | RESPIRATION RATE: 18 BRPM

## 2023-05-27 DIAGNOSIS — J98.4 PNEUMONITIS: Primary | ICD-10-CM

## 2023-05-27 DIAGNOSIS — R06.02 SOB (SHORTNESS OF BREATH): ICD-10-CM

## 2023-05-27 DIAGNOSIS — R05.9 COUGH: ICD-10-CM

## 2023-05-27 LAB
ALBUMIN SERPL-MCNC: 3.3 G/DL (ref 3.3–5.5)
ALP SERPL-CCNC: 55 U/L (ref 42–141)
BILIRUB SERPL-MCNC: 0.5 MG/DL (ref 0.2–1.6)
BUN SERPL-MCNC: 12 MG/DL (ref 7–22)
CALCIUM SERPL-MCNC: 10.6 MG/DL (ref 8–10.3)
CHLORIDE SERPL-SCNC: 104 MMOL/L (ref 98–108)
CREAT SERPL-MCNC: 1 MG/DL (ref 0.6–1.2)
CTP QC/QA: YES
GLUCOSE SERPL-MCNC: 120 MG/DL (ref 73–118)
POC ALT (SGPT): 15 U/L (ref 10–47)
POC AST (SGOT): 24 U/L (ref 11–38)
POC B-TYPE NATRIURETIC PEPTIDE: 34.9 PG/ML (ref 0–100)
POC CARDIAC TROPONIN I: 0 NG/ML (ref 0–0.08)
POC PTINR: 1.4 (ref 0.9–1.2)
POC PTWBT: 16.9 SEC (ref 9.7–14.3)
POC TCO2: 27 MMOL/L (ref 18–33)
POCT GLUCOSE: 133 MG/DL (ref 70–110)
POTASSIUM BLD-SCNC: 5 MMOL/L (ref 3.6–5.1)
PROTEIN, POC: 7.7 G/DL (ref 6.4–8.1)
SAMPLE: ABNORMAL
SAMPLE: NORMAL
SARS-COV-2 RDRP RESP QL NAA+PROBE: NEGATIVE
SODIUM BLD-SCNC: 145 MMOL/L (ref 128–145)

## 2023-05-27 PROCEDURE — 77470 SPECIAL RADIATION TREATMENT: CPT | Mod: 59,TC,HCNC | Performed by: RADIOLOGY

## 2023-05-27 PROCEDURE — 82962 GLUCOSE BLOOD TEST: CPT | Mod: HCNC,ER

## 2023-05-27 PROCEDURE — 77470 SPECIAL RADIATION TREATMENT: CPT | Mod: 26,59,HCNC, | Performed by: RADIOLOGY

## 2023-05-27 PROCEDURE — 77338 PR  MLC IMRT DESIGN & CONSTRUCTION PER IMRT PLAN: ICD-10-PCS | Mod: 26,HCNC,, | Performed by: RADIOLOGY

## 2023-05-27 PROCEDURE — 77338 DESIGN MLC DEVICE FOR IMRT: CPT | Mod: TC,HCNC | Performed by: RADIOLOGY

## 2023-05-27 PROCEDURE — 77338 DESIGN MLC DEVICE FOR IMRT: CPT | Mod: 26,HCNC,, | Performed by: RADIOLOGY

## 2023-05-27 PROCEDURE — 93010 EKG 12-LEAD: ICD-10-PCS | Mod: HCNC,,, | Performed by: INTERNAL MEDICINE

## 2023-05-27 PROCEDURE — 83880 ASSAY OF NATRIURETIC PEPTIDE: CPT | Mod: HCNC,ER

## 2023-05-27 PROCEDURE — 77470 PR  SPECIAL RADIATION TREATMENT: ICD-10-PCS | Mod: 26,59,HCNC, | Performed by: RADIOLOGY

## 2023-05-27 PROCEDURE — 84484 ASSAY OF TROPONIN QUANT: CPT | Mod: HCNC,ER

## 2023-05-27 PROCEDURE — 85025 COMPLETE CBC W/AUTO DIFF WBC: CPT | Mod: HCNC,ER

## 2023-05-27 PROCEDURE — 93010 ELECTROCARDIOGRAM REPORT: CPT | Mod: HCNC,,, | Performed by: INTERNAL MEDICINE

## 2023-05-27 PROCEDURE — 85610 PROTHROMBIN TIME: CPT | Mod: HCNC,ER

## 2023-05-27 PROCEDURE — 93005 ELECTROCARDIOGRAM TRACING: CPT | Mod: HCNC,ER

## 2023-05-27 PROCEDURE — 80053 COMPREHEN METABOLIC PANEL: CPT | Mod: HCNC,ER

## 2023-05-27 PROCEDURE — 77300 PR RADIATION THERAPY,DOSIMETRY PLAN: ICD-10-PCS | Mod: 26,HCNC,, | Performed by: RADIOLOGY

## 2023-05-27 PROCEDURE — 77300 RADIATION THERAPY DOSE PLAN: CPT | Mod: 26,HCNC,, | Performed by: RADIOLOGY

## 2023-05-27 PROCEDURE — 77300 RADIATION THERAPY DOSE PLAN: CPT | Mod: TC,HCNC | Performed by: RADIOLOGY

## 2023-05-27 PROCEDURE — 99285 EMERGENCY DEPT VISIT HI MDM: CPT | Mod: 25,HCNC,ER

## 2023-05-27 RX ORDER — DOXYCYCLINE 100 MG/1
100 CAPSULE ORAL 2 TIMES DAILY
Qty: 20 CAPSULE | Refills: 0 | Status: SHIPPED | OUTPATIENT
Start: 2023-05-27 | End: 2023-06-06

## 2023-05-27 RX ORDER — PREDNISONE 10 MG/1
10 TABLET ORAL DAILY
Qty: 21 TABLET | Refills: 0 | Status: SHIPPED | OUTPATIENT
Start: 2023-05-27 | End: 2023-08-24

## 2023-05-27 NOTE — TELEPHONE ENCOUNTER
"Hannah states "I am doing cancer treatments. I am pt of Dr. Lara and Dr. Avina. They told me to watch out for pneumonitis. Recently started with SOB & coughing a day ago. Radiation scheduled for Monday." Pulse 106, O2 sats 95% on RA. Repeat pulse is 97 & O2 sats are 97% RA while sitting. Temp is 97.2 via I-help thermometer. Advised per triage protocol to see a physician (or PCP triage) within next 4 hours at ED for physician eval. Requesting on call provider be contacted. Informed caller of brief hold. V/u.     Unable to reach on call provider. Updated Hannah and instructed pt to go to nearest ED now for physician eval. V/u.   Reason for Disposition   [1] MILD difficulty breathing (e.g., minimal/no SOB at rest, SOB with walking, pulse <100) AND [2] NEW-onset or WORSE than normal    Additional Information   Negative: SEVERE difficulty breathing (e.g., struggling for each breath, speaks in single words)   Negative: [1] Breathing stopped AND [2] hasn't returned   Negative: Choking on something   Negative: Bluish (or gray) lips or face now   Negative: Difficult to awaken or acting confused (e.g., disoriented, slurred speech)   Negative: Passed out (i.e., lost consciousness, collapsed and was not responding)   Negative: Wheezing started suddenly after medicine, an allergic food or bee sting   Negative: Stridor (harsh sound while breathing in)   Negative: Slow, shallow and weak breathing   Negative: Sounds like a life-threatening emergency to the triager   Negative: Chest pain   Negative: [1] MODERATE difficulty breathing (e.g., speaks in phrases, SOB even at rest, pulse 100-120) AND [2] NEW-onset or WORSE than normal   Negative: [1] Neutropenia known or suspected (e.g., recent cancer chemotherapy) AND [2] fever > 100.4 F (38.0 C)   Negative: Oxygen level (e.g., pulse oximetry) 90 percent or lower   Negative: Wheezing can be heard across the room   Negative: Drooling or spitting out saliva (because can't swallow)   " "Negative: History of prior "blood clot" in leg or lungs (i.e., deep vein thrombosis, pulmonary embolism)   Negative: History of inherited increased risk of blood clots (e.g., Factor 5 Leiden, Anti-thrombin 3, Protein C or Protein S deficiency, Prothrombin mutation)   Negative: Major surgery in the past month   Negative: Hip or leg fracture (broken bone) in past month (or had cast on leg or ankle in past month)   Negative: Illness requiring prolonged bedrest in past month (e.g., immobilization, long hospital stay)   Negative: Long-distance travel in past month (e.g., car, bus, train, plane; with trip lasting 6 or more hours)   Negative: Extra heartbeats, irregular heart beating, or heart is beating very fast  (i.e., "palpitations")   Negative: Fever > 103 F (39.4 C)   Negative: [1] Fever > 101 F (38.3 C) AND [2] age > 60 years   Negative: [1] Fever > 100.0 F (37.8 C) AND [2] diabetes mellitus or weak immune system (e.g., HIV positive, cancer chemo, splenectomy, organ transplant, chronic steroids)   Negative: Patient sounds very sick or weak to the triager    Protocols used: Cancer - Breathing Difficulty-A-AH    "

## 2023-05-27 NOTE — ED PROVIDER NOTES
Encounter Date: 5/27/2023    SCRIBE #1 NOTE: I, Gala Matias, am scribing for, and in the presence of,  Stephen Angeles MD. I have scribed the following portions of the note - Other sections scribed: HPI, ROS, PE, MDM.     History     Chief Complaint   Patient presents with    Shortness of Breath     Pt reports SOB that is worse with ambulation and nonproductive cough since yesterday; currently receiving immunotherapy for lung cancer, told by on call oncology nurse to be seen today due to concern for pneumonitis.      Hannah Montoya is a 67 y.o. female, with a past medical history of asthma, cardiac pacemaker, cancer, diabetes, hyperlipidemia, hypertension, emphysema, and COPD (not on home O2), who presents to the ED with shortness of breath and nonproductive cough that began yesterday. Patient reports symptoms are now resolved. Patient is receiving immunotherapy for lung cancer and her oncologists were concerned about pneumonitis. Patient denies chest pain, wheezing, pain with breathing, leg swelling, calf pain, abdominal pain, or other associated symptoms. Patient quit smoking tobacco in 2016. Patient denies history of blood clots. Patient denies blood thinners. Patient has allergy to taxol and carboplatin.     The history is provided by the patient. No  was used.   Review of patient's allergies indicates:   Allergen Reactions    Taxol [paclitaxel]      Hypersensitivity reaction to taxol, symptoms included shortness of breath, nausea, dizziness, flushing     Carboplatin Other (See Comments)     Itching and hives    Adhesive Rash     tegaderm burns and blistered skin     Past Medical History:   Diagnosis Date    AICD (automatic cardioverter/defibrillator) present     Asthma     bronchitis in past    Breast cancer 2016    right    Cardiac pacemaker     Cardiomyopathy     COPD (chronic obstructive pulmonary disease)     Diabetes mellitus, type 2     Hyperglycemia      Hyperlipidemia     Hypertension     Malignant neoplasm of overlapping sites of female breast 2016    Nuclear sclerosis of both eyes 2020    Respiratory distress 3/12/2020     Past Surgical History:   Procedure Laterality Date    BIOPSY, WITH CT GUIDANCE Right 2023    Procedure: LUNG MASS BIOPSY, WITH CT GUIDANCE;  Surgeon: Yehuda Green MD;  Location: [facility] CATH LAB;  Service: Radiology;  Laterality: Right;    BREAST BIOPSY Right 2016    IDC    BREAST LUMPECTOMY Right     CARDIAC CATHETERIZATION Bilateral 2017    CARDIAC DEFIBRILLATOR PLACEMENT Left 08/10/2017    CARDIAC DEFIBRILLATOR PLACEMENT Left 2018     SECTION      x2    CHOLECYSTECTOMY      INSERTION OF TUNNELED CENTRAL VENOUS CATHETER (CVC) WITH SUBCUTANEOUS PORT Right 2020    Procedure: TORBRREUS-LCDO-N-CATH, RIGHT;  Surgeon: Josefa Caceres MD;  Location: [address];  Service: General;  Laterality: Right;  Port-a-cath placed to R. IJ    LUNG BIOPSY N/A 2020    Procedure: BIOPSY, LUNG;  Surgeon: [facility];  Location: [facility] OR;  Service: Radiology;  Laterality: N/A;  8AM START  RN PREOP 2020---COVID NEGATIVE    NAVIGATIONAL BRONCHOSCOPY N/A 10/13/2020    Procedure: BRONCHOSCOPY, NAVIGATIONAL;  Surgeon: Jamilah Weaver MD;  Location: [address];  Service: Pulmonary;  Laterality: N/A;    REVISION OF IMPLANTABLE CARDIOVERTER-DEFIBRILLATOR (ICD) ELECTRODE LEAD PLACEMENT N/A 6/15/2018    Procedure: REVISION-LEAD-ICD;  Surgeon: Javy Gates MD;  Location: [facility] CATH LAB;  Service: Cardiology;  Laterality: N/A;  LBBB, Bi-V ICD HIS Elmo rev, MDT, Choice, MB, 3 Prep    ROBOT-ASSISTED LAPAROSCOPIC LYMPHADENECTOMY USING DA SALVADOR XI Right 10/23/2020    Procedure: XI ROBOTIC LYMPHADENECTOMY;  Surgeon: Ramo Tucker MD;  Location: [address];  Service: Thoracic;  Laterality: Right;    TUBAL LIGATION       Family History   Problem Relation Age of Onset    Kidney disease  Mother     Cataracts Mother     Kidney disease Father     Cataracts Father     Clotting disorder Brother     No Known Problems Daughter     No Known Problems Son     No Known Problems Sister     No Known Problems Maternal Aunt     No Known Problems Maternal Uncle     No Known Problems Paternal Aunt     No Known Problems Paternal Uncle     Macular degeneration Maternal Grandmother     No Known Problems Maternal Grandfather     No Known Problems Paternal Grandmother     No Known Problems Paternal Grandfather     Breast cancer Neg Hx     Ovarian cancer Neg Hx     Amblyopia Neg Hx     Blindness Neg Hx     Cancer Neg Hx     Diabetes Neg Hx     Glaucoma Neg Hx     Hypertension Neg Hx     Retinal detachment Neg Hx     Strabismus Neg Hx     Stroke Neg Hx     Thyroid disease Neg Hx      Social History     Tobacco Use    Smoking status: Former     Packs/day: 0.50     Years: 40.00     Pack years: 20.00     Types: Cigarettes     Quit date: 2016     Years since quittin.8     Passive exposure: Past    Smokeless tobacco: Never   Substance Use Topics    Alcohol use: Yes     Alcohol/week: 1.0 standard drink     Types: 1 Glasses of wine per week     Comment: rare- holiday    Drug use: No     Review of Systems   Constitutional:  Negative for fatigue.   HENT:  Negative for sore throat.    Eyes:  Negative for visual disturbance.   Respiratory:  Positive for cough and shortness of breath (resolved).    Cardiovascular:  Negative for chest pain.   Gastrointestinal:  Negative for abdominal pain.   Genitourinary:  Negative for dysuria.   Musculoskeletal:  Negative for back pain.   Skin:  Negative for rash.   Neurological:  Negative for headaches.     Physical Exam     Initial Vitals [23 1305]   BP Pulse Resp Temp SpO2   121/81 101 18 98.2 °F (36.8 °C) 95 %      MAP       --         Physical Exam    Nursing note and vitals reviewed.  Constitutional: She appears well-developed and well-nourished.   Eyes: EOM are normal. Pupils  are equal, round, and reactive to light.   Neck: Neck supple. No thyromegaly present. No JVD present.   Normal range of motion.  Cardiovascular:  Normal rate, regular rhythm, normal heart sounds and intact distal pulses.     Exam reveals no gallop and no friction rub.       No murmur heard.  Pulmonary/Chest: Breath sounds normal. No respiratory distress.   Abdominal: Abdomen is soft. Bowel sounds are normal.   Musculoskeletal:         General: No tenderness or edema. Normal range of motion.      Cervical back: Normal range of motion and neck supple.     Neurological: She is alert and oriented to person, place, and time. She has normal strength.   Skin: Skin is warm and dry.   Psychiatric: She has a normal mood and affect.       ED Course   Procedures  Labs Reviewed   POCT GLUCOSE - Abnormal; Notable for the following components:       Result Value    POCT Glucose 133 (*)     All other components within normal limits   ISTAT PROCEDURE - Abnormal; Notable for the following components:    POC PTWBT 16.9 (*)     POC PTINR 1.4 (*)     All other components within normal limits   POCT CMP - Abnormal; Notable for the following components:    Calcium, POC 10.6 (*)     POC Glucose 120 (*)     All other components within normal limits   TROPONIN ISTAT   SARS-COV-2 RDRP GENE    Narrative:     This test utilizes isothermal nucleic acid amplification technology to detect the SARS-CoV-2 RdRp nucleic acid segment. The analytical sensitivity (limit of detection) is 500 copies/swab.     A POSITIVE result is indicative of the presence of SARS-CoV-2 RNA; clinical correlation with patient history and other diagnostic information is necessary to determine patient infection status.    A NEGATIVE result means that SARS-CoV-2 nucleic acids are not present above the limit of detection. A NEGATIVE result should be treated as presumptive. It does not rule out the possibility of COVID-19 and should not be the sole basis for treatment decisions.  If COVID-19 is strongly suspected based on clinical and exposure history, re-testing using an alternate molecular assay should be considered.     This test is only for use under the Food and Drug Administration s Emergency Use Authorization (EUA).     Commercial kits are provided by [employer]. Performance characteristics of the EUA have been independently verified by [facility] Department of Pathology and Laboratory Medicine.   _________________________________________________________________   The authorized Fact Sheet for Healthcare Providers and the authorized Fact Sheet for Patients of the ID NOW COVID-19 are available on the FDA website:    https://www.fda.gov/media/169280/download      https://www.fda.gov/media/007601/download      POCT CBC   POCT GLUCOSE MONITORING CONTINUOUS   POCT CMP   POCT PROTIME-INR   POCT TROPONIN   POCT B-TYPE NATRIURETIC PEPTIDE (BNP)   POCT B-TYPE NATRIURETIC PEPTIDE (BNP)     EKG Readings: (Independently Interpreted)   Initial Reading: No STEMI. Heart Rate: 101. Ectopy: No Ectopy. Conduction: Normal. ST Segments: Normal ST Segments. T Waves: Normal.   Ventricular paced     Imaging Results              X-Ray Chest PA And Lateral (Final result)  Result time 05/27/23 14:01:55      Final result by George Arciniega MD (05/27/23 14:01:55)                   Impression:      1. Patchy consolidation projects over the right upper lung zone and possibly the lateral aspect of the right lower lung zone suggesting infectious process.  Findings are superimposed upon edema.  Correlation and follow-up is advised.      Electronically signed by: George Arciniega MD  Date:    05/27/2023  Time:    14:01               Narrative:    EXAMINATION:  XR CHEST PA AND LATERAL    CLINICAL HISTORY:  Cough, unspecified    TECHNIQUE:  PA and lateral views of the chest were performed.    COMPARISON:  01/24/2023    FINDINGS:  Left chest wall pacer noted.  The cardiomediastinal silhouette is not  enlarged noting calcification of the aorta..  There is no pleural effusion.  The trachea is midline.  The lungs are symmetrically expanded bilaterally with coarse interstitial attenuation bilaterally.  There is bilateral basilar subsegmental atelectasis.  There is patchy consolidation projected over the right upper lung zone and to a lesser degree the lateral aspect of the right lower lung zone, new since the previous exam..  There is no pneumothorax.  The osseous structures are remarkable for degenerative changes..                                       Medications - No data to display  Medical Decision Making:   History:   Old Medical Records: I decided to obtain old medical records.  Initial Assessment:   This is an emergent evaluation of a 67 y.o. female who presents with shortness of breath and cough that began yesterday. Patient's oncologists concerned about pneumonitis. The patient was seen and examined. The history and physical exam was obtained. The nursing notes and vital signs were reviewed. Secondary to symptoms and examination findings, I ordered chest X-ray, EKG, COVID, and glucose monitoring.     Differential Diagnosis:   Includes but is not limited to: COVID-19, pneumonitis, viral illness, COPD excerebration, asthma excerebration   Independently Interpreted Test(s):   I have ordered and independently interpreted EKG Reading(s) - see prior notes  Clinical Tests:   Lab Tests: Reviewed and Ordered  Radiological Study: Ordered and Reviewed  Medical Tests: Ordered and Reviewed     Pt cxr read as possible edema and pneumonia. PT has known lung ca. No fever. Normal WBC and normal BNP.  Sats normal. PT feels comfortable going home. Pt also has COPD. Will cover with steroid taper and ABX.      Scribe Attestation:   Scribe #1: I performed the above scribed service and the documentation accurately describes the services I performed. I attest to the accuracy of the note.                 I, Stephen Angeles  personally performed the services described in this documentation. All medical record entries made by the scribe were at my direction and in my presence. I have reviewed the chart and agree that the record reflects my personal performance and is accurate and complete.    Clinical Impression:   Final diagnoses:  [R05.9] Cough  [R06.02] SOB (shortness of breath)  [J18.9] Pneumonitis (Primary)        ED Disposition Condition    Discharge Stable          ED Prescriptions       Medication Sig Dispense Start Date End Date Auth. Provider    doxycycline (VIBRAMYCIN) 100 MG Cap Take 1 capsule (100 mg total) by mouth 2 (two) times daily. for 10 days 20 capsule 5/27/2023 6/6/2023 Stephen Angeles MD    predniSONE (DELTASONE) 10 MG tablet Take 1 tablet (10 mg total) by mouth once daily. Take 4 tabs x 3 days, then take 2 tabs x 3 days, then take 1 tab x 3 days. 21 tablet 5/27/2023 -- Stephen Angeles MD          Follow-up Information       Follow up With Specialties Details Why Contact Info    Emanuel Chavez MD Family Medicine, Wound Care   [address] [facility] [place] [id number] [phone]          call you doctor treating your lungs monday.             Stephen Angeles MD  05/27/23 1982

## 2023-05-29 ENCOUNTER — PATIENT OUTREACH (OUTPATIENT)
Dept: EMERGENCY MEDICINE | Facility: HOSPITAL | Age: 68
End: 2023-05-29

## 2023-05-29 ENCOUNTER — PATIENT MESSAGE (OUTPATIENT)
Dept: ELECTROPHYSIOLOGY | Facility: CLINIC | Age: 68
End: 2023-05-29
Payer: MEDICARE

## 2023-05-31 ENCOUNTER — TELEPHONE (OUTPATIENT)
Dept: FAMILY MEDICINE | Facility: CLINIC | Age: 68
End: 2023-05-31
Payer: MEDICARE

## 2023-06-01 ENCOUNTER — HOSPITAL ENCOUNTER (OUTPATIENT)
Dept: RADIATION THERAPY | Facility: HOSPITAL | Age: 68
Discharge: HOME | End: 2023-06-01
Attending: RADIOLOGY
Payer: MEDICARE

## 2023-06-01 ENCOUNTER — OFFICE VISIT (OUTPATIENT)
Dept: FAMILY MEDICINE | Facility: CLINIC | Age: 68
End: 2023-06-01
Payer: MEDICARE

## 2023-06-01 VITALS
WEIGHT: 181.44 LBS | RESPIRATION RATE: 18 BRPM | DIASTOLIC BLOOD PRESSURE: 64 MMHG | TEMPERATURE: 98 F | HEIGHT: 62 IN | BODY MASS INDEX: 33.39 KG/M2 | SYSTOLIC BLOOD PRESSURE: 120 MMHG | OXYGEN SATURATION: 97 %

## 2023-06-01 DIAGNOSIS — Z85.3 HISTORY OF BREAST CANCER IN FEMALE: ICD-10-CM

## 2023-06-01 DIAGNOSIS — Z95.810 CARDIAC RESYNCHRONIZATION THERAPY DEFIBRILLATOR (CRT-D) IN PLACE: ICD-10-CM

## 2023-06-01 DIAGNOSIS — E11.9 TYPE 2 DIABETES MELLITUS WITHOUT COMPLICATION, WITHOUT LONG-TERM CURRENT USE OF INSULIN: ICD-10-CM

## 2023-06-01 DIAGNOSIS — E66.09 CLASS 1 OBESITY DUE TO EXCESS CALORIES WITH SERIOUS COMORBIDITY AND BODY MASS INDEX (BMI) OF 33.0 TO 33.9 IN ADULT: ICD-10-CM

## 2023-06-01 DIAGNOSIS — I10 BENIGN ESSENTIAL HTN: Primary | ICD-10-CM

## 2023-06-01 DIAGNOSIS — D84.821 DRUG-INDUCED IMMUNODEFICIENCY: ICD-10-CM

## 2023-06-01 DIAGNOSIS — C34.91 NSCLC OF RIGHT LUNG: ICD-10-CM

## 2023-06-01 DIAGNOSIS — F33.1 MODERATE EPISODE OF RECURRENT MAJOR DEPRESSIVE DISORDER: ICD-10-CM

## 2023-06-01 DIAGNOSIS — I42.0 DILATED CARDIOMYOPATHY: ICD-10-CM

## 2023-06-01 DIAGNOSIS — Z79.899 DRUG-INDUCED IMMUNODEFICIENCY: ICD-10-CM

## 2023-06-01 PROCEDURE — 1160F PR REVIEW ALL MEDS BY PRESCRIBER/CLIN PHARMACIST DOCUMENTED: ICD-10-PCS | Mod: HCNC,CPTII,S$GLB, | Performed by: FAMILY MEDICINE

## 2023-06-01 PROCEDURE — 1126F PR PAIN SEVERITY QUANTIFIED, NO PAIN PRESENT: ICD-10-PCS | Mod: HCNC,CPTII,S$GLB, | Performed by: FAMILY MEDICINE

## 2023-06-01 PROCEDURE — 99214 PR OFFICE/OUTPT VISIT, EST, LEVL IV, 30-39 MIN: ICD-10-PCS | Mod: HCNC,S$GLB,, | Performed by: FAMILY MEDICINE

## 2023-06-01 PROCEDURE — 99214 OFFICE O/P EST MOD 30 MIN: CPT | Mod: HCNC,S$GLB,, | Performed by: FAMILY MEDICINE

## 2023-06-01 PROCEDURE — 3044F PR MOST RECENT HEMOGLOBIN A1C LEVEL <7.0%: ICD-10-PCS | Mod: HCNC,CPTII,S$GLB, | Performed by: FAMILY MEDICINE

## 2023-06-01 PROCEDURE — 1159F PR MEDICATION LIST DOCUMENTED IN MEDICAL RECORD: ICD-10-PCS | Mod: HCNC,CPTII,S$GLB, | Performed by: FAMILY MEDICINE

## 2023-06-01 PROCEDURE — 3074F SYST BP LT 130 MM HG: CPT | Mod: HCNC,CPTII,S$GLB, | Performed by: FAMILY MEDICINE

## 2023-06-01 PROCEDURE — 1157F PR ADVANCE CARE PLAN OR EQUIV PRESENT IN MEDICAL RECORD: ICD-10-PCS | Mod: HCNC,CPTII,S$GLB, | Performed by: FAMILY MEDICINE

## 2023-06-01 PROCEDURE — 1101F PT FALLS ASSESS-DOCD LE1/YR: CPT | Mod: HCNC,CPTII,S$GLB, | Performed by: FAMILY MEDICINE

## 2023-06-01 PROCEDURE — 99499 UNLISTED E&M SERVICE: CPT | Mod: S$GLB,,, | Performed by: FAMILY MEDICINE

## 2023-06-01 PROCEDURE — 3074F PR MOST RECENT SYSTOLIC BLOOD PRESSURE < 130 MM HG: ICD-10-PCS | Mod: HCNC,CPTII,S$GLB, | Performed by: FAMILY MEDICINE

## 2023-06-01 PROCEDURE — 3288F PR FALLS RISK ASSESSMENT DOCUMENTED: ICD-10-PCS | Mod: HCNC,CPTII,S$GLB, | Performed by: FAMILY MEDICINE

## 2023-06-01 PROCEDURE — 1157F ADVNC CARE PLAN IN RCRD: CPT | Mod: HCNC,CPTII,S$GLB, | Performed by: FAMILY MEDICINE

## 2023-06-01 PROCEDURE — 1101F PR PT FALLS ASSESS DOC 0-1 FALLS W/OUT INJ PAST YR: ICD-10-PCS | Mod: HCNC,CPTII,S$GLB, | Performed by: FAMILY MEDICINE

## 2023-06-01 PROCEDURE — 1160F RVW MEDS BY RX/DR IN RCRD: CPT | Mod: HCNC,CPTII,S$GLB, | Performed by: FAMILY MEDICINE

## 2023-06-01 PROCEDURE — 1126F AMNT PAIN NOTED NONE PRSNT: CPT | Mod: HCNC,CPTII,S$GLB, | Performed by: FAMILY MEDICINE

## 2023-06-01 PROCEDURE — 4010F PR ACE/ARB THEARPY RXD/TAKEN: ICD-10-PCS | Mod: HCNC,CPTII,S$GLB, | Performed by: FAMILY MEDICINE

## 2023-06-01 PROCEDURE — 99999 PR PBB SHADOW E&M-EST. PATIENT-LVL V: ICD-10-PCS | Mod: PBBFAC,HCNC,, | Performed by: FAMILY MEDICINE

## 2023-06-01 PROCEDURE — 3008F PR BODY MASS INDEX (BMI) DOCUMENTED: ICD-10-PCS | Mod: HCNC,CPTII,S$GLB, | Performed by: FAMILY MEDICINE

## 2023-06-01 PROCEDURE — 4010F ACE/ARB THERAPY RXD/TAKEN: CPT | Mod: HCNC,CPTII,S$GLB, | Performed by: FAMILY MEDICINE

## 2023-06-01 PROCEDURE — 3288F FALL RISK ASSESSMENT DOCD: CPT | Mod: HCNC,CPTII,S$GLB, | Performed by: FAMILY MEDICINE

## 2023-06-01 PROCEDURE — 3008F BODY MASS INDEX DOCD: CPT | Mod: HCNC,CPTII,S$GLB, | Performed by: FAMILY MEDICINE

## 2023-06-01 PROCEDURE — 3044F HG A1C LEVEL LT 7.0%: CPT | Mod: HCNC,CPTII,S$GLB, | Performed by: FAMILY MEDICINE

## 2023-06-01 PROCEDURE — 1159F MED LIST DOCD IN RCRD: CPT | Mod: HCNC,CPTII,S$GLB, | Performed by: FAMILY MEDICINE

## 2023-06-01 PROCEDURE — 99999 PR PBB SHADOW E&M-EST. PATIENT-LVL V: CPT | Mod: PBBFAC,HCNC,, | Performed by: FAMILY MEDICINE

## 2023-06-01 PROCEDURE — 3078F PR MOST RECENT DIASTOLIC BLOOD PRESSURE < 80 MM HG: ICD-10-PCS | Mod: HCNC,CPTII,S$GLB, | Performed by: FAMILY MEDICINE

## 2023-06-01 PROCEDURE — 3078F DIAST BP <80 MM HG: CPT | Mod: HCNC,CPTII,S$GLB, | Performed by: FAMILY MEDICINE

## 2023-06-01 RX ORDER — PACLITAXEL 6 MG/ML
INJECTION, SOLUTION INTRAVENOUS
COMMUNITY
Start: 2023-02-27 | End: 2023-06-01

## 2023-06-01 RX ORDER — NIVOLUMAB 10 MG/ML
INJECTION INTRAVENOUS
COMMUNITY
Start: 2023-03-20

## 2023-06-01 RX ORDER — MIDAZOLAM HYDROCHLORIDE 1 MG/ML
INJECTION INTRAMUSCULAR; INTRAVENOUS
COMMUNITY
Start: 2023-01-24 | End: 2023-06-03

## 2023-06-01 RX ORDER — IPILIMUMAB 5 MG/ML
INJECTION INTRAVENOUS
COMMUNITY
Start: 2023-03-20

## 2023-06-01 RX ORDER — BARIUM SULFATE 20 MG/ML
SUSPENSION ORAL
COMMUNITY
Start: 2023-01-12

## 2023-06-01 RX ORDER — PALONOSETRON 0.05 MG/ML
INJECTION, SOLUTION INTRAVENOUS
COMMUNITY
Start: 2023-02-27

## 2023-06-01 RX ORDER — NIVOLUMAB 10 MG/ML
INJECTION INTRAVENOUS
COMMUNITY
Start: 2023-02-06

## 2023-06-01 RX ORDER — DIPHENHYDRAMINE HYDROCHLORIDE 50 MG/ML
INJECTION INTRAMUSCULAR; INTRAVENOUS
COMMUNITY
Start: 2023-02-27 | End: 2023-10-03 | Stop reason: ALTCHOICE

## 2023-06-01 RX ORDER — CARBOPLATIN 10 MG/ML
INJECTION INTRAVENOUS
COMMUNITY
Start: 2023-02-27 | End: 2023-10-03

## 2023-06-01 RX ORDER — APREPITANT 130 MG/18ML
INJECTION, EMULSION INTRAVENOUS
COMMUNITY
Start: 2023-02-27 | End: 2023-10-03 | Stop reason: ALTCHOICE

## 2023-06-01 RX ORDER — FENTANYL CITRATE 50 UG/ML
INJECTION, SOLUTION INTRAMUSCULAR; INTRAVENOUS
COMMUNITY
Start: 2023-01-24 | End: 2023-06-03

## 2023-06-01 RX ORDER — DEXAMETHASONE SODIUM PHOSPHATE 4 MG/ML
INJECTION, SOLUTION INTRA-ARTICULAR; INTRALESIONAL; INTRAMUSCULAR; INTRAVENOUS; SOFT TISSUE
COMMUNITY
Start: 2023-02-27 | End: 2023-10-03 | Stop reason: ALTCHOICE

## 2023-06-01 RX ORDER — IOHEXOL 350 MG/ML
INJECTION, SOLUTION INTRAVENOUS
COMMUNITY
Start: 2023-02-16 | End: 2023-06-03

## 2023-06-03 PROBLEM — D84.821 DRUG-INDUCED IMMUNODEFICIENCY: Status: ACTIVE | Noted: 2023-06-03

## 2023-06-03 PROBLEM — T82.198A MALFUNCTION OF IMPLANTABLE DEFIBRILLATOR VENTRICULAR (ICD) LEAD: Status: RESOLVED | Noted: 2018-06-15 | Resolved: 2023-06-03

## 2023-06-03 PROBLEM — F33.1 MODERATE EPISODE OF RECURRENT MAJOR DEPRESSIVE DISORDER: Status: ACTIVE | Noted: 2023-06-03

## 2023-06-03 PROBLEM — Z79.899 DRUG-INDUCED IMMUNODEFICIENCY: Status: ACTIVE | Noted: 2023-06-03

## 2023-06-03 NOTE — PROGRESS NOTES
Routine Office Visit    Patient Name: Hannah Montoya    : 1955  MRN: [id number]    Subjective:  Hannah is a 67 y.o. female who presents today for:    1. HTN and type 2 DM  Patient following up today for HTN and type 2 DM.  She is currently going through treatment for recurrence of Non-small cell lung cancer.  She states that she went through immunotherpy, but had to pause due to pneumonitis and cant start radiation until pneumonitis resolves.  She states she has been taking all of her medications as prescribed.  She has not had headaches, blurred vision, weakness, or shortness of breath.  She has lost weight and blood sugars are much better controlled since starting Ozempic per patient.  She states she doesn't eat much, but still eats healthy portions of food at least twice a day.  Labs done prior to visit showed blood sugars much better controlled.  She was started on Ozempic and is being monitored by Dr. Olivarez.  She sees EP for cardiomyopathy due to pacemaker placement.  She states other than her cancer treatments, she has no new concerns.  She reports reading that one report stated she had an expected 3 year life expectancy and that has been a source of depression.    2.  Depression  Patient states that there are several days where she doesn't want to leave her house.  She states it started with covid when she had to stay home, but has worsened since being diagnosed with lung cancer again.  Patient had first perry with lung cancer 3 years ago and prior to that had battled breast cancer.  She has had CHF/cardiomyopathy and has had pacemaker placement as well.  She states she quit smoking several years ago and had been feeling better overall, but when she was diagnosed with lung cancer this second time she has felt defeated. She states that she wants to travel and go do things, but doesn't have anyone to go with as she is a  and her children don't want to go anywhere due to their own ailments with  mental and physical health.  Patient did recently think about checking with her brother who travels with his wife across the country and are both nurses.  She states she may go with them as she is not flying anywhere.  There is no SI/HI or AH/VH    Past Medical History  Past Medical History:   Diagnosis Date    AICD (automatic cardioverter/defibrillator) present     Asthma     bronchitis in past    Breast cancer 2016    right    Cardiac pacemaker     Cardiomyopathy     COPD (chronic obstructive pulmonary disease)     Diabetes mellitus, type 2     Hyperglycemia     Hyperlipidemia     Hypertension     Malignant neoplasm of overlapping sites of female breast 2016    Nuclear sclerosis of both eyes 2020    Respiratory distress 3/12/2020       Past Surgical History  Past Surgical History:   Procedure Laterality Date    BIOPSY, WITH CT GUIDANCE Right 2023    Procedure: LUNG MASS BIOPSY, WITH CT GUIDANCE;  Surgeon: Yehuda Green MD;  Location: [facility], [place], operated by [facility] CATH LAB;  Service: Radiology;  Laterality: Right;    BREAST BIOPSY Right 2016    IDC    BREAST LUMPECTOMY Right     CARDIAC CATHETERIZATION Bilateral 2017    CARDIAC DEFIBRILLATOR PLACEMENT Left 08/10/2017    CARDIAC DEFIBRILLATOR PLACEMENT Left 2018     SECTION      x2    CHOLECYSTECTOMY      INSERTION OF TUNNELED CENTRAL VENOUS CATHETER (CVC) WITH SUBCUTANEOUS PORT Right 2020    Procedure: EOVNORTED-CONY-U-CATH, RIGHT;  Surgeon: Josefa Caceres MD;  Location: [facility] OR [address] [place] [address];  Service: General;  Laterality: Right;  Port-a-cath placed to R. IJ    LUNG BIOPSY N/A 2020    Procedure: BIOPSY, LUNG;  Surgeon: Kalpesh Diagnostic Provider;  Location: [facility] OR;  Service: Radiology;  Laterality: N/A;  8AM START  RN PREOP 2020---COVID NEGATIVE    NAVIGATIONAL BRONCHOSCOPY N/A 10/13/2020    Procedure: BRONCHOSCOPY, NAVIGATIONAL;  Surgeon: Jamilah Weaver MD;  Location: [facility] OR [address] [place] [address];  Service: Pulmonary;  Laterality: N/A;     REVISION OF IMPLANTABLE CARDIOVERTER-DEFIBRILLATOR (ICD) ELECTRODE LEAD PLACEMENT N/A 6/15/2018    Procedure: REVISION-LEAD-ICD;  Surgeon: Javy Gates MD;  Location: [facility] CATH LAB;  Service: Cardiology;  Laterality: N/A;  LBBB, Bi-V ICD HIS Ld rev, MDT, Choice, MB, 3 Prep    ROBOT-ASSISTED LAPAROSCOPIC LYMPHADENECTOMY USING DA SALVADOR XI Right 10/23/2020    Procedure: XI ROBOTIC LYMPHADENECTOMY;  Surgeon: Ramo Tucker MD;  [facility];  Service: Thoracic;  Laterality: Right;    TUBAL LIGATION         Family History  Family History   Problem Relation Age of Onset    Kidney disease Mother     Cataracts Mother     Kidney disease Father     Cataracts Father     Clotting disorder Brother     No Known Problems Daughter     No Known Problems Son     No Known Problems Sister     No Known Problems Maternal Aunt     No Known Problems Maternal Uncle     No Known Problems Paternal Aunt     No Known Problems Paternal Uncle     Macular degeneration Maternal Grandmother     No Known Problems Maternal Grandfather     No Known Problems Paternal Grandmother     No Known Problems Paternal Grandfather     Breast cancer Neg Hx     Ovarian cancer Neg Hx     Amblyopia Neg Hx     Blindness Neg Hx     Cancer Neg Hx     Diabetes Neg Hx     Glaucoma Neg Hx     Hypertension Neg Hx     Retinal detachment Neg Hx     Strabismus Neg Hx     Stroke Neg Hx     Thyroid disease Neg Hx        Social History  Social History     Socioeconomic History    Marital status:     Number of children: 2   Occupational History     Employer: [employer]   Tobacco Use    Smoking status: Former     Packs/day: 0.50     Years: 40.00     Pack years: 20.00     Types: Cigarettes     Quit date: 2016     Years since quittin.8     Passive exposure: Past    Smokeless tobacco: Never   Substance and Sexual Activity    Alcohol use: Yes     Alcohol/week: 1.0 standard drink     Types: 1 Glasses of wine per week     Comment: rare-  holiday    Drug use: No    Sexual activity: Not Currently     Partners: Male     Comment:        Current Medications  Current Outpatient Medications on File Prior to Visit   Medication Sig Dispense Refill    ACCU-CHEK ALFRED PLUS TEST STRP Strp USE TO TEST THREE TIMES DAILY 200 strip 3    albuterol sulfate (PROAIR RESPICLICK) 90 mcg/actuation AePB Inhale 2 puffs into the lungs every 4 (four) hours as needed. Rescue 1 each 5    amLODIPine (NORVASC) 5 MG tablet TAKE 1 TABLET BY MOUTH EVERY DAY (Patient taking differently: Take 5 mg by mouth 2 (two) times daily.) 90 tablet 3    atorvastatin (LIPITOR) 10 MG tablet Take 1 tablet (10 mg total) by mouth once daily. 90 tablet 3    buPROPion (WELLBUTRIN XL) 150 MG TB24 tablet Take 1 tablet (150 mg total) by mouth once daily. 90 tablet 0    CARBOplatin (PARAPLATIN) 10 mg/mL injection       cetirizine (ZYRTEC) 10 MG tablet Take 10 mg by mouth every evening.       CINVANTI 7.2 mg/mL Emul injection       dexAMETHasone (DECADRON) 4 mg/mL injection       diphenhydrAMINE (BENADRYL) 50 mg/mL injection       doxycycline (VIBRAMYCIN) 100 MG Cap Take 1 capsule (100 mg total) by mouth 2 (two) times daily. for 10 days 20 capsule 0    fluticasone-salmeterol diskus inhaler 250-50 mcg Inhale 1 puff into the lungs 2 (two) times daily. Controller 180 each 0    losartan (COZAAR) 100 MG tablet Take 1 tablet (100 mg total) by mouth once daily. 90 tablet 2    metFORMIN (GLUCOPHAGE) 500 MG tablet Take 2 tablets (1,000 mg total) by mouth 2 (two) times daily with meals. 360 tablet 3    metoprolol succinate (TOPROL-XL) 100 MG 24 hr tablet Take 1 tablet (100 mg total) by mouth once daily. 90 tablet 3    multivitamin (THERAGRAN) per tablet Take 1 tablet by mouth once daily.      mupirocin (BACTROBAN) 2 % ointment Apply topically 3 (three) times daily. 30 g 1    OLANZapine (ZYPREXA) 5 MG tablet Take 1 tablet (5 mg total) by mouth every evening. Take 1 tablet by mouth every evening on days 1-4 of  each chemotherapy cycle 30 tablet 1    ondansetron (ZOFRAN-ODT) 8 MG TbDL Take 1 tablet (8 mg total) by mouth every 8 (eight) hours as needed (nausea/vomiting). Take 1 tablet (8 mg) by mouth every 8 hours as needed for nausea/vomiting. 60 tablet 5    OPDIVO 120 mg/12 mL Soln       OPDIVO 240 mg/24 mL Soln       OPDIVO 40 mg/4 mL injection       palonosetron (ALOXI) 0.25 mg/5 mL injection       pantoprazole (PROTONIX) 40 MG tablet Take 1 tablet (40 mg total) by mouth once daily. 90 tablet 3    predniSONE (DELTASONE) 10 MG tablet Take 1 tablet (10 mg total) by mouth once daily. Take 4 tabs x 3 days, then take 2 tabs x 3 days, then take 1 tab x 3 days. 21 tablet 0    prochlorperazine (COMPAZINE) 10 MG tablet Take 1 tablet (10 mg total) by mouth every 6 (six) hours as needed (nausea/vomiting - second choice). 30 tablet 1    READI-CAT 2 2 % (w/v) suspension       semaglutide (OZEMPIC) 0.25 mg or 0.5 mg(2 mg/1.5 mL) pen injector Take 0.25 mg for 4 weeks, then increase to 0.5 mg weekly 4 pen 3    sertraline (ZOLOFT) 100 MG tablet TAKE 1 TABLET BY MOUTH EVERY EVENING. 90 tablet 3    tiotropium (SPIRIVA WITH HANDIHALER) 18 mcg inhalation capsule INHALE 1 CAPSULE BY MOUTH EVERY DAY. 90 capsule 3    triamcinolone acetonide 0.1% (KENALOG) 0.1 % cream Apply topically 2 (two) times daily. 453.6 g 2    YERVOY 50 mg/10 mL (5 mg/mL)       [DISCONTINUED] fentaNYL (SUBLIMAZE) 50 mcg/mL injection       [DISCONTINUED] midazolam, PF, (VERSED) 1 mg/mL Soln injection       [DISCONTINUED] OMNIPAQUE 350 350 mg iodine/mL Soln injection        Current Facility-Administered Medications on File Prior to Visit   Medication Dose Route Frequency Provider Last Rate Last Admin    fentaNYL injection 25 mcg  25 mcg Intravenous Q5 Min PRN Keesha Martins MD        haloperidol lactate injection 0.5 mg  0.5 mg Intravenous Once PRN Keesha Martins MD        HYDROmorphone injection 0.2 mg  0.2 mg Intravenous Q5 Min PRN Keesha Martins MD        ondansetron injection 4 mg   "4 mg Intravenous Once PRN Keesha Martins MD        sodium chloride 0.9% flush 10 mL  10 mL Intravenous PRN Keesha Martins MD           Allergies   Review of patient's allergies indicates:   Allergen Reactions    Taxol [paclitaxel]      Hypersensitivity reaction to taxol, symptoms included shortness of breath, nausea, dizziness, flushing     Carboplatin Other (See Comments)     Itching and hives    Adhesive Rash     tegaderm burns and blistered skin       Review of Systems (Pertinent positives)  Review of Systems   Constitutional: Negative.    HENT: Negative.     Eyes: Negative.    Respiratory: Negative.     Cardiovascular: Negative.    Gastrointestinal: Negative.    Skin: Negative.    Psychiatric/Behavioral:  Positive for depression.        /64   Temp 98.1 °F (36.7 °C) (Oral)   Resp 18   Ht 5' 2" (1.575 m)   Wt 82.3 kg (181 lb 7 oz)   LMP  (LMP Unknown)   SpO2 97%   BMI 33.19 kg/m²     GENERAL APPEARANCE: in no apparent distress and well developed and well nourished  HEENT: PERRL, EOMI, Sclera clear, anicteric, Oropharynx clear, no lesions, Neck supple with midline trachea  NECK: normal, supple, no adenopathy, thyroid normal in size  RESPIRATORY: appears well, vitals normal, no respiratory distress, acyanotic, normal RR, chest clear, no wheezing, crepitations, rhonchi, normal symmetric air entry  HEART: regular rate and rhythm, S1, S2 normal, no murmur, click, rub or gallop.    ABDOMEN: abdomen is soft without tenderness, no masses, no hernias, no organomegaly, no rebound, no guarding. Suprapubic tenderness absent. No CVA tenderness.  NEUROLOGIC: normal without focal findings, CN II-XII are intact.   SKIN: no rashes, no wounds, no other lesions  PSYCH: Alert, oriented x 3, thought content appropriate, speech normal, pleasant and cooperative, good eye contact, well groomed,    Assessment/Plan:  Hannah Montoya is a 67 y.o. female who presents today for :    Hannah was seen today for follow-up, diabetes, " hypertension and depression.    Diagnoses and all orders for this visit:    Benign essential HTN   - stable with current regimen   - followed by cardiology    Dilated cardiomyopathy   - stable with no acute symptoms   - compliant with medication regimen   - no changes in regimen at this time   - followed by cardiology    Cardiac resynchronization therapy defibrillator (CRT-D) in place   - scheduled for follow up with EP   - no complaints at this time    History of breast cancer in female   - monitored by oncology   - up to date on screenings    Type 2 diabetes mellitus without complication, without long-term current use of insulin   - a1c up to date and <8   - monitored by Dr. Olivarez   - continue current regimen    Class 1 obesity due to excess calories with serious comorbidity and body mass index (BMI) of 33.0 to 33.9 in adult   - continue working on diet   - continue ozempic   - encouraged exercise to help with mental and physical health    Depression   - patient encouraged to travel and do things she wants to do   - spent  a lot of time discussing how she can go on small trips locally, so that she is comfortable   - encouraged her to tell her family her wishes and encourage them strongly to go with her   - continue Wellbutrin as prescribed   - olanzapine prescribed by DANIEL for sleep per patient    NSCLC of right lung   - monitored by oncology   - will start radiation once pneumonitis resolved   - she is to follow plan of can that she and her oncologist has decided upon and encouraged to communicate how she is feeling as she progresses through therapy

## 2023-06-08 DIAGNOSIS — J44.1 COPD WITH ACUTE EXACERBATION: ICD-10-CM

## 2023-06-08 RX ORDER — FLUTICASONE PROPIONATE AND SALMETEROL 250; 50 UG/1; UG/1
POWDER RESPIRATORY (INHALATION)
Qty: 180 EACH | Refills: 3 | Status: SHIPPED | OUTPATIENT
Start: 2023-06-08

## 2023-06-08 NOTE — TELEPHONE ENCOUNTER
Refill Decision Note   Hannah Montoya  is requesting a refill authorization.  Brief Assessment and Rationale for Refill:  Approve     Medication Therapy Plan:         Alert overridden per protocol: Yes   Comments:     No Care Gaps recommended.     Note composed:11:56 AM 06/08/2023

## 2023-06-08 NOTE — TELEPHONE ENCOUNTER
No care due was identified.  [facility] Embedded Care Due Messages. Reference number: [id number].   6/08/2023 3:33:01 AM CDT

## 2023-06-12 ENCOUNTER — DOCUMENTATION ONLY (OUTPATIENT)
Dept: CARDIOLOGY | Facility: HOSPITAL | Age: 68
End: 2023-06-12
Payer: MEDICARE

## 2023-06-12 ENCOUNTER — PATIENT MESSAGE (OUTPATIENT)
Dept: RADIATION ONCOLOGY | Facility: CLINIC | Age: 68
End: 2023-06-12

## 2023-06-12 PROCEDURE — 77014 PR  CT GUIDANCE PLACEMENT RAD THERAPY FIELDS: ICD-10-PCS | Mod: 26,HCNC,, | Performed by: RADIOLOGY

## 2023-06-12 PROCEDURE — 77014 PR  CT GUIDANCE PLACEMENT RAD THERAPY FIELDS: CPT | Mod: 26,HCNC,, | Performed by: RADIOLOGY

## 2023-06-12 PROCEDURE — 77386 HC IMRT, COMPLEX: CPT | Mod: HCNC | Performed by: RADIOLOGY

## 2023-06-12 PROCEDURE — 77014 HC CT GUIDANCE RADIATION THERAPY FLDS PLACEMENT: CPT | Mod: TC,HCNC | Performed by: RADIOLOGY

## 2023-06-12 NOTE — PROGRESS NOTES
MDT CRT-D, 1st Magnet  application for Radiation.    Magnet placed over CRT-D device, audible tone heard.  Magnet taped to chest per Radiation staff.    Staff will remove magnet post treatment.

## 2023-06-13 ENCOUNTER — PES CALL (OUTPATIENT)
Dept: ADMINISTRATIVE | Facility: CLINIC | Age: 68
End: 2023-06-13
Payer: MEDICARE

## 2023-06-13 PROCEDURE — 77014 PR  CT GUIDANCE PLACEMENT RAD THERAPY FIELDS: ICD-10-PCS | Mod: 26,HCNC,, | Performed by: RADIOLOGY

## 2023-06-13 PROCEDURE — 77014 PR  CT GUIDANCE PLACEMENT RAD THERAPY FIELDS: CPT | Mod: 26,HCNC,, | Performed by: RADIOLOGY

## 2023-06-13 PROCEDURE — 77386 HC IMRT, COMPLEX: CPT | Mod: HCNC | Performed by: RADIOLOGY

## 2023-06-13 PROCEDURE — 77014 HC CT GUIDANCE RADIATION THERAPY FLDS PLACEMENT: CPT | Mod: TC,HCNC | Performed by: RADIOLOGY

## 2023-06-14 PROCEDURE — 77014 HC CT GUIDANCE RADIATION THERAPY FLDS PLACEMENT: CPT | Mod: TC,HCNC | Performed by: RADIOLOGY

## 2023-06-14 PROCEDURE — 77386 HC IMRT, COMPLEX: CPT | Mod: HCNC | Performed by: RADIOLOGY

## 2023-06-14 PROCEDURE — 77014 PR  CT GUIDANCE PLACEMENT RAD THERAPY FIELDS: ICD-10-PCS | Mod: 26,HCNC,, | Performed by: RADIOLOGY

## 2023-06-14 PROCEDURE — 77014 PR  CT GUIDANCE PLACEMENT RAD THERAPY FIELDS: CPT | Mod: 26,HCNC,, | Performed by: RADIOLOGY

## 2023-06-15 ENCOUNTER — DOCUMENTATION ONLY (OUTPATIENT)
Dept: RADIATION ONCOLOGY | Facility: CLINIC | Age: 68
End: 2023-06-15
Payer: MEDICARE

## 2023-06-15 PROCEDURE — 77386 HC IMRT, COMPLEX: CPT | Mod: HCNC | Performed by: RADIOLOGY

## 2023-06-15 PROCEDURE — 77014 PR  CT GUIDANCE PLACEMENT RAD THERAPY FIELDS: ICD-10-PCS | Mod: 26,HCNC,, | Performed by: RADIOLOGY

## 2023-06-15 PROCEDURE — 77014 HC CT GUIDANCE RADIATION THERAPY FLDS PLACEMENT: CPT | Mod: TC,HCNC | Performed by: RADIOLOGY

## 2023-06-15 PROCEDURE — 77014 PR  CT GUIDANCE PLACEMENT RAD THERAPY FIELDS: CPT | Mod: 26,HCNC,, | Performed by: RADIOLOGY

## 2023-06-15 NOTE — PLAN OF CARE
DAY 4 of outpatient radiation txt to lung. Cardiac Magnet applied during treatment. Information handout given. Daughter present. Tolerating txt well. Weighs 185.3 lbs

## 2023-06-16 PROCEDURE — 77014 PR  CT GUIDANCE PLACEMENT RAD THERAPY FIELDS: CPT | Mod: 26,HCNC,, | Performed by: RADIOLOGY

## 2023-06-16 PROCEDURE — 77386 HC IMRT, COMPLEX: CPT | Mod: HCNC | Performed by: RADIOLOGY

## 2023-06-16 PROCEDURE — 77014 PR  CT GUIDANCE PLACEMENT RAD THERAPY FIELDS: ICD-10-PCS | Mod: 26,HCNC,, | Performed by: RADIOLOGY

## 2023-06-16 PROCEDURE — 77014 HC CT GUIDANCE RADIATION THERAPY FLDS PLACEMENT: CPT | Mod: TC,HCNC | Performed by: RADIOLOGY

## 2023-06-19 PROCEDURE — 77014 PR  CT GUIDANCE PLACEMENT RAD THERAPY FIELDS: CPT | Mod: 26,HCNC,, | Performed by: RADIOLOGY

## 2023-06-19 PROCEDURE — 77386 HC IMRT, COMPLEX: CPT | Mod: HCNC | Performed by: RADIOLOGY

## 2023-06-19 PROCEDURE — 77014 HC CT GUIDANCE RADIATION THERAPY FLDS PLACEMENT: CPT | Mod: TC,HCNC | Performed by: RADIOLOGY

## 2023-06-19 PROCEDURE — 77014 PR  CT GUIDANCE PLACEMENT RAD THERAPY FIELDS: ICD-10-PCS | Mod: 26,HCNC,, | Performed by: RADIOLOGY

## 2023-06-19 PROCEDURE — 77336 RADIATION PHYSICS CONSULT: CPT | Mod: HCNC | Performed by: RADIOLOGY

## 2023-06-20 PROCEDURE — 77014 PR  CT GUIDANCE PLACEMENT RAD THERAPY FIELDS: ICD-10-PCS | Mod: 26,HCNC,, | Performed by: RADIOLOGY

## 2023-06-20 PROCEDURE — 77386 HC IMRT, COMPLEX: CPT | Mod: HCNC | Performed by: RADIOLOGY

## 2023-06-20 PROCEDURE — 77014 PR  CT GUIDANCE PLACEMENT RAD THERAPY FIELDS: CPT | Mod: 26,HCNC,, | Performed by: RADIOLOGY

## 2023-06-20 PROCEDURE — 77014 HC CT GUIDANCE RADIATION THERAPY FLDS PLACEMENT: CPT | Mod: TC,HCNC | Performed by: RADIOLOGY

## 2023-06-21 PROCEDURE — 77014 PR  CT GUIDANCE PLACEMENT RAD THERAPY FIELDS: CPT | Mod: 26,HCNC,, | Performed by: RADIOLOGY

## 2023-06-21 PROCEDURE — 77014 PR  CT GUIDANCE PLACEMENT RAD THERAPY FIELDS: ICD-10-PCS | Mod: 26,HCNC,, | Performed by: RADIOLOGY

## 2023-06-21 PROCEDURE — 77014 HC CT GUIDANCE RADIATION THERAPY FLDS PLACEMENT: CPT | Mod: TC,HCNC | Performed by: RADIOLOGY

## 2023-06-21 PROCEDURE — 77386 HC IMRT, COMPLEX: CPT | Mod: HCNC | Performed by: RADIOLOGY

## 2023-06-22 ENCOUNTER — DOCUMENTATION ONLY (OUTPATIENT)
Dept: RADIATION ONCOLOGY | Facility: CLINIC | Age: 68
End: 2023-06-22
Payer: MEDICARE

## 2023-06-22 PROCEDURE — 77014 PR  CT GUIDANCE PLACEMENT RAD THERAPY FIELDS: ICD-10-PCS | Mod: 26,HCNC,, | Performed by: RADIOLOGY

## 2023-06-22 PROCEDURE — 77014 HC CT GUIDANCE RADIATION THERAPY FLDS PLACEMENT: CPT | Mod: TC,HCNC | Performed by: RADIOLOGY

## 2023-06-22 PROCEDURE — 77386 HC IMRT, COMPLEX: CPT | Mod: HCNC | Performed by: RADIOLOGY

## 2023-06-22 PROCEDURE — 77014 PR  CT GUIDANCE PLACEMENT RAD THERAPY FIELDS: CPT | Mod: 26,HCNC,, | Performed by: RADIOLOGY

## 2023-06-22 NOTE — PLAN OF CARE
Day 9 of outpatient txt to lung. C/o upper respiratory symptoms such as runny and stuffy nose with a non productive cough. Denies hemoptysiS and fever. Magnet placed over cardiac device during txt and monitored.

## 2023-06-23 PROCEDURE — 77014 PR  CT GUIDANCE PLACEMENT RAD THERAPY FIELDS: CPT | Mod: 26,HCNC,, | Performed by: RADIOLOGY

## 2023-06-23 PROCEDURE — 77014 HC CT GUIDANCE RADIATION THERAPY FLDS PLACEMENT: CPT | Mod: TC,HCNC | Performed by: RADIOLOGY

## 2023-06-23 PROCEDURE — 77386 HC IMRT, COMPLEX: CPT | Mod: HCNC | Performed by: RADIOLOGY

## 2023-06-23 PROCEDURE — 77336 RADIATION PHYSICS CONSULT: CPT | Mod: HCNC | Performed by: RADIOLOGY

## 2023-06-23 PROCEDURE — 77014 PR  CT GUIDANCE PLACEMENT RAD THERAPY FIELDS: ICD-10-PCS | Mod: 26,HCNC,, | Performed by: RADIOLOGY

## 2023-06-26 ENCOUNTER — PATIENT MESSAGE (OUTPATIENT)
Dept: ENDOCRINOLOGY | Facility: CLINIC | Age: 68
End: 2023-06-26
Payer: MEDICARE

## 2023-06-26 PROCEDURE — 77014 PR  CT GUIDANCE PLACEMENT RAD THERAPY FIELDS: ICD-10-PCS | Mod: 26,HCNC,, | Performed by: RADIOLOGY

## 2023-06-26 PROCEDURE — 77386 HC IMRT, COMPLEX: CPT | Mod: HCNC | Performed by: RADIOLOGY

## 2023-06-26 PROCEDURE — 77014 HC CT GUIDANCE RADIATION THERAPY FLDS PLACEMENT: CPT | Mod: TC,HCNC | Performed by: RADIOLOGY

## 2023-06-26 PROCEDURE — 77014 PR  CT GUIDANCE PLACEMENT RAD THERAPY FIELDS: CPT | Mod: 26,HCNC,, | Performed by: RADIOLOGY

## 2023-06-27 PROCEDURE — 77014 PR  CT GUIDANCE PLACEMENT RAD THERAPY FIELDS: ICD-10-PCS | Mod: 26,HCNC,, | Performed by: RADIOLOGY

## 2023-06-27 PROCEDURE — 77014 PR  CT GUIDANCE PLACEMENT RAD THERAPY FIELDS: CPT | Mod: 26,HCNC,, | Performed by: RADIOLOGY

## 2023-06-27 PROCEDURE — 77386 HC IMRT, COMPLEX: CPT | Mod: HCNC | Performed by: RADIOLOGY

## 2023-06-27 PROCEDURE — 77014 HC CT GUIDANCE RADIATION THERAPY FLDS PLACEMENT: CPT | Mod: TC,HCNC | Performed by: RADIOLOGY

## 2023-06-28 PROCEDURE — 77386 HC IMRT, COMPLEX: CPT | Mod: HCNC | Performed by: RADIOLOGY

## 2023-06-28 PROCEDURE — 77014 HC CT GUIDANCE RADIATION THERAPY FLDS PLACEMENT: CPT | Mod: TC,HCNC | Performed by: RADIOLOGY

## 2023-06-28 PROCEDURE — 77014 PR  CT GUIDANCE PLACEMENT RAD THERAPY FIELDS: ICD-10-PCS | Mod: 26,HCNC,, | Performed by: RADIOLOGY

## 2023-06-28 PROCEDURE — 77014 PR  CT GUIDANCE PLACEMENT RAD THERAPY FIELDS: CPT | Mod: 26,HCNC,, | Performed by: RADIOLOGY

## 2023-06-29 ENCOUNTER — DOCUMENTATION ONLY (OUTPATIENT)
Dept: RADIATION ONCOLOGY | Facility: CLINIC | Age: 68
End: 2023-06-29
Payer: MEDICARE

## 2023-06-29 PROCEDURE — 77014 PR  CT GUIDANCE PLACEMENT RAD THERAPY FIELDS: CPT | Mod: 26,HCNC,, | Performed by: RADIOLOGY

## 2023-06-29 PROCEDURE — 77014 PR  CT GUIDANCE PLACEMENT RAD THERAPY FIELDS: ICD-10-PCS | Mod: 26,HCNC,, | Performed by: RADIOLOGY

## 2023-06-29 PROCEDURE — 77386 HC IMRT, COMPLEX: CPT | Mod: HCNC | Performed by: RADIOLOGY

## 2023-06-29 PROCEDURE — 77014 HC CT GUIDANCE RADIATION THERAPY FLDS PLACEMENT: CPT | Mod: TC,HCNC | Performed by: RADIOLOGY

## 2023-06-29 NOTE — PLAN OF CARE
DAY 14 of outpatient radiation treatment to lung. Denies dyspnea and hemoptysis. Magnet placed on cardiac device and monitored during treatment. Daughter present. Follow up appointment made.

## 2023-06-30 ENCOUNTER — PATIENT MESSAGE (OUTPATIENT)
Dept: ELECTROPHYSIOLOGY | Facility: CLINIC | Age: 68
End: 2023-06-30
Payer: MEDICARE

## 2023-06-30 PROCEDURE — 77014 PR  CT GUIDANCE PLACEMENT RAD THERAPY FIELDS: ICD-10-PCS | Mod: 26,HCNC,, | Performed by: RADIOLOGY

## 2023-06-30 PROCEDURE — 77336 RADIATION PHYSICS CONSULT: CPT | Mod: HCNC | Performed by: RADIOLOGY

## 2023-06-30 PROCEDURE — 77386 HC IMRT, COMPLEX: CPT | Mod: HCNC | Performed by: RADIOLOGY

## 2023-06-30 PROCEDURE — 77014 PR  CT GUIDANCE PLACEMENT RAD THERAPY FIELDS: CPT | Mod: 26,HCNC,, | Performed by: RADIOLOGY

## 2023-06-30 PROCEDURE — 77014 HC CT GUIDANCE RADIATION THERAPY FLDS PLACEMENT: CPT | Mod: TC,HCNC | Performed by: RADIOLOGY

## 2023-07-03 ENCOUNTER — LAB VISIT (OUTPATIENT)
Dept: LAB | Facility: HOSPITAL | Age: 68
End: 2023-07-03
Attending: INTERNAL MEDICINE
Payer: MEDICARE

## 2023-07-03 ENCOUNTER — TELEPHONE (OUTPATIENT)
Dept: HEMATOLOGY/ONCOLOGY | Facility: CLINIC | Age: 68
End: 2023-07-03
Payer: MEDICARE

## 2023-07-03 ENCOUNTER — CLINICAL SUPPORT (OUTPATIENT)
Dept: CARDIOLOGY | Facility: HOSPITAL | Age: 68
End: 2023-07-03
Attending: INTERNAL MEDICINE
Payer: MEDICARE

## 2023-07-03 ENCOUNTER — OFFICE VISIT (OUTPATIENT)
Dept: HEMATOLOGY/ONCOLOGY | Facility: CLINIC | Age: 68
End: 2023-07-03
Payer: MEDICARE

## 2023-07-03 VITALS
WEIGHT: 181 LBS | DIASTOLIC BLOOD PRESSURE: 61 MMHG | RESPIRATION RATE: 18 BRPM | HEIGHT: 62 IN | BODY MASS INDEX: 33.31 KG/M2 | HEART RATE: 103 BPM | SYSTOLIC BLOOD PRESSURE: 138 MMHG | TEMPERATURE: 98 F | OXYGEN SATURATION: 95 %

## 2023-07-03 DIAGNOSIS — Z95.810 CARDIAC RESYNCHRONIZATION THERAPY DEFIBRILLATOR (CRT-D) IN PLACE: ICD-10-CM

## 2023-07-03 DIAGNOSIS — Z85.3 HISTORY OF BREAST CANCER IN FEMALE: ICD-10-CM

## 2023-07-03 DIAGNOSIS — E03.9 HYPOTHYROIDISM, UNSPECIFIED TYPE: ICD-10-CM

## 2023-07-03 DIAGNOSIS — J70.0 RADIATION PNEUMONITIS: ICD-10-CM

## 2023-07-03 DIAGNOSIS — I42.0 DILATED CARDIOMYOPATHY: ICD-10-CM

## 2023-07-03 DIAGNOSIS — I49.8 OTHER CARDIAC ARRHYTHMIA: ICD-10-CM

## 2023-07-03 DIAGNOSIS — C34.91 NSCLC OF RIGHT LUNG: ICD-10-CM

## 2023-07-03 DIAGNOSIS — C34.91 NSCLC OF RIGHT LUNG: Primary | ICD-10-CM

## 2023-07-03 DIAGNOSIS — I10 ESSENTIAL HYPERTENSION: ICD-10-CM

## 2023-07-03 DIAGNOSIS — I44.7 LEFT BUNDLE-BRANCH BLOCK: ICD-10-CM

## 2023-07-03 LAB
ALBUMIN SERPL BCP-MCNC: 3.6 G/DL (ref 3.5–5.2)
ALP SERPL-CCNC: 52 U/L (ref 55–135)
ALT SERPL W/O P-5'-P-CCNC: 15 U/L (ref 10–44)
ANION GAP SERPL CALC-SCNC: 11 MMOL/L (ref 8–16)
AST SERPL-CCNC: 20 U/L (ref 10–40)
BASOPHILS # BLD AUTO: 0.05 K/UL (ref 0–0.2)
BASOPHILS NFR BLD: 0.9 % (ref 0–1.9)
BILIRUB SERPL-MCNC: 0.4 MG/DL (ref 0.1–1)
BUN SERPL-MCNC: 19 MG/DL (ref 8–23)
CALCIUM SERPL-MCNC: 10.2 MG/DL (ref 8.7–10.5)
CHLORIDE SERPL-SCNC: 107 MMOL/L (ref 95–110)
CO2 SERPL-SCNC: 25 MMOL/L (ref 23–29)
CREAT SERPL-MCNC: 1 MG/DL (ref 0.5–1.4)
DIFFERENTIAL METHOD: ABNORMAL
EOSINOPHIL # BLD AUTO: 0.1 K/UL (ref 0–0.5)
EOSINOPHIL NFR BLD: 1.4 % (ref 0–8)
ERYTHROCYTE [DISTWIDTH] IN BLOOD BY AUTOMATED COUNT: 16.9 % (ref 11.5–14.5)
EST. GFR  (NO RACE VARIABLE): >60 ML/MIN/1.73 M^2
GLUCOSE SERPL-MCNC: 115 MG/DL (ref 70–110)
HCT VFR BLD AUTO: 37 % (ref 37–48.5)
HGB BLD-MCNC: 11.9 G/DL (ref 12–16)
IMM GRANULOCYTES # BLD AUTO: 0.05 K/UL (ref 0–0.04)
IMM GRANULOCYTES NFR BLD AUTO: 0.9 % (ref 0–0.5)
LYMPHOCYTES # BLD AUTO: 0.5 K/UL (ref 1–4.8)
LYMPHOCYTES NFR BLD: 8.4 % (ref 18–48)
MCH RBC QN AUTO: 28.3 PG (ref 27–31)
MCHC RBC AUTO-ENTMCNC: 32.2 G/DL (ref 32–36)
MCV RBC AUTO: 88 FL (ref 82–98)
MONOCYTES # BLD AUTO: 0.6 K/UL (ref 0.3–1)
MONOCYTES NFR BLD: 10.3 % (ref 4–15)
NEUTROPHILS # BLD AUTO: 4.5 K/UL (ref 1.8–7.7)
NEUTROPHILS NFR BLD: 78.1 % (ref 38–73)
NRBC BLD-RTO: 0 /100 WBC
PLATELET # BLD AUTO: 199 K/UL (ref 150–450)
PMV BLD AUTO: 12 FL (ref 9.2–12.9)
POTASSIUM SERPL-SCNC: 4.7 MMOL/L (ref 3.5–5.1)
PROT SERPL-MCNC: 7.5 G/DL (ref 6–8.4)
RBC # BLD AUTO: 4.2 M/UL (ref 4–5.4)
SODIUM SERPL-SCNC: 143 MMOL/L (ref 136–145)
T4 FREE SERPL-MCNC: 0.94 NG/DL (ref 0.71–1.51)
TSH SERPL DL<=0.005 MIU/L-ACNC: 2.81 UIU/ML (ref 0.4–4)
WBC # BLD AUTO: 5.72 K/UL (ref 3.9–12.7)

## 2023-07-03 PROCEDURE — 1126F PR PAIN SEVERITY QUANTIFIED, NO PAIN PRESENT: ICD-10-PCS | Mod: HCNC,CPTII,S$GLB, | Performed by: INTERNAL MEDICINE

## 2023-07-03 PROCEDURE — 4010F PR ACE/ARB THEARPY RXD/TAKEN: ICD-10-PCS | Mod: HCNC,CPTII,S$GLB, | Performed by: INTERNAL MEDICINE

## 2023-07-03 PROCEDURE — 1159F PR MEDICATION LIST DOCUMENTED IN MEDICAL RECORD: ICD-10-PCS | Mod: HCNC,CPTII,S$GLB, | Performed by: INTERNAL MEDICINE

## 2023-07-03 PROCEDURE — 99999 PR PBB SHADOW E&M-EST. PATIENT-LVL V: CPT | Mod: PBBFAC,HCNC,, | Performed by: INTERNAL MEDICINE

## 2023-07-03 PROCEDURE — 99215 PR OFFICE/OUTPT VISIT, EST, LEVL V, 40-54 MIN: ICD-10-PCS | Mod: HCNC,S$GLB,, | Performed by: INTERNAL MEDICINE

## 2023-07-03 PROCEDURE — 1157F ADVNC CARE PLAN IN RCRD: CPT | Mod: HCNC,CPTII,S$GLB, | Performed by: INTERNAL MEDICINE

## 2023-07-03 PROCEDURE — 3078F PR MOST RECENT DIASTOLIC BLOOD PRESSURE < 80 MM HG: ICD-10-PCS | Mod: HCNC,CPTII,S$GLB, | Performed by: INTERNAL MEDICINE

## 2023-07-03 PROCEDURE — 3008F BODY MASS INDEX DOCD: CPT | Mod: HCNC,CPTII,S$GLB, | Performed by: INTERNAL MEDICINE

## 2023-07-03 PROCEDURE — 99999 PR PBB SHADOW E&M-EST. PATIENT-LVL V: ICD-10-PCS | Mod: PBBFAC,HCNC,, | Performed by: INTERNAL MEDICINE

## 2023-07-03 PROCEDURE — 84443 ASSAY THYROID STIM HORMONE: CPT | Mod: HCNC | Performed by: INTERNAL MEDICINE

## 2023-07-03 PROCEDURE — 3008F PR BODY MASS INDEX (BMI) DOCUMENTED: ICD-10-PCS | Mod: HCNC,CPTII,S$GLB, | Performed by: INTERNAL MEDICINE

## 2023-07-03 PROCEDURE — 93284 CARDIAC DEVICE CHECK - IN CLINIC & HOSPITAL: ICD-10-PCS | Mod: 26,,, | Performed by: INTERNAL MEDICINE

## 2023-07-03 PROCEDURE — 36415 COLL VENOUS BLD VENIPUNCTURE: CPT | Mod: HCNC | Performed by: INTERNAL MEDICINE

## 2023-07-03 PROCEDURE — 3075F SYST BP GE 130 - 139MM HG: CPT | Mod: HCNC,CPTII,S$GLB, | Performed by: INTERNAL MEDICINE

## 2023-07-03 PROCEDURE — 99215 OFFICE O/P EST HI 40 MIN: CPT | Mod: HCNC,S$GLB,, | Performed by: INTERNAL MEDICINE

## 2023-07-03 PROCEDURE — 1157F PR ADVANCE CARE PLAN OR EQUIV PRESENT IN MEDICAL RECORD: ICD-10-PCS | Mod: HCNC,CPTII,S$GLB, | Performed by: INTERNAL MEDICINE

## 2023-07-03 PROCEDURE — 3288F PR FALLS RISK ASSESSMENT DOCUMENTED: ICD-10-PCS | Mod: HCNC,CPTII,S$GLB, | Performed by: INTERNAL MEDICINE

## 2023-07-03 PROCEDURE — 3078F DIAST BP <80 MM HG: CPT | Mod: HCNC,CPTII,S$GLB, | Performed by: INTERNAL MEDICINE

## 2023-07-03 PROCEDURE — 1101F PT FALLS ASSESS-DOCD LE1/YR: CPT | Mod: HCNC,CPTII,S$GLB, | Performed by: INTERNAL MEDICINE

## 2023-07-03 PROCEDURE — 3288F FALL RISK ASSESSMENT DOCD: CPT | Mod: HCNC,CPTII,S$GLB, | Performed by: INTERNAL MEDICINE

## 2023-07-03 PROCEDURE — 85025 COMPLETE CBC W/AUTO DIFF WBC: CPT | Mod: HCNC | Performed by: INTERNAL MEDICINE

## 2023-07-03 PROCEDURE — 84439 ASSAY OF FREE THYROXINE: CPT | Mod: HCNC | Performed by: INTERNAL MEDICINE

## 2023-07-03 PROCEDURE — 3075F PR MOST RECENT SYSTOLIC BLOOD PRESS GE 130-139MM HG: ICD-10-PCS | Mod: HCNC,CPTII,S$GLB, | Performed by: INTERNAL MEDICINE

## 2023-07-03 PROCEDURE — 4010F ACE/ARB THERAPY RXD/TAKEN: CPT | Mod: HCNC,CPTII,S$GLB, | Performed by: INTERNAL MEDICINE

## 2023-07-03 PROCEDURE — 93284 PRGRMG EVAL IMPLANTABLE DFB: CPT | Mod: 26,,, | Performed by: INTERNAL MEDICINE

## 2023-07-03 PROCEDURE — 1101F PR PT FALLS ASSESS DOC 0-1 FALLS W/OUT INJ PAST YR: ICD-10-PCS | Mod: HCNC,CPTII,S$GLB, | Performed by: INTERNAL MEDICINE

## 2023-07-03 PROCEDURE — 93284 PRGRMG EVAL IMPLANTABLE DFB: CPT | Mod: HCNC

## 2023-07-03 PROCEDURE — 1126F AMNT PAIN NOTED NONE PRSNT: CPT | Mod: HCNC,CPTII,S$GLB, | Performed by: INTERNAL MEDICINE

## 2023-07-03 PROCEDURE — 3044F PR MOST RECENT HEMOGLOBIN A1C LEVEL <7.0%: ICD-10-PCS | Mod: HCNC,CPTII,S$GLB, | Performed by: INTERNAL MEDICINE

## 2023-07-03 PROCEDURE — 1159F MED LIST DOCD IN RCRD: CPT | Mod: HCNC,CPTII,S$GLB, | Performed by: INTERNAL MEDICINE

## 2023-07-03 PROCEDURE — 3044F HG A1C LEVEL LT 7.0%: CPT | Mod: HCNC,CPTII,S$GLB, | Performed by: INTERNAL MEDICINE

## 2023-07-03 PROCEDURE — 80053 COMPREHEN METABOLIC PANEL: CPT | Mod: HCNC | Performed by: INTERNAL MEDICINE

## 2023-07-03 NOTE — PROGRESS NOTES
ONCOLOGY FOLLOW UP VISIT.     Reason for visit: Toxicity visit on 9[facility] for recurrent stage III NSCLC    Cancer/Stage/TNM:    Cancer Staging   NSCLC of right lung  Staging form: Lung, AJCC 8th Edition  - Clinical stage from 9/29/2020: Stage IIIA (cT3, cN1, cM0) - Signed by Ramo Tucker MD on 10/24/2020         Oncology History   NSCLC of right lung   9/29/2020 Cancer Staged    Staging form: Lung, AJCC 8th Edition  - Clinical stage from 9/29/2020: Stage IIIA (cT3, cN1, cM0)     10/14/2020 Initial Diagnosis    NSCLC of right lung     11/17/2020 - 12/30/2020 Radiation Therapy    Treating physician: Harvinder Vásquez  Technique  Energy  Dose/Fx (Gy)  #Fx  Total Dose (Gy)    RLL Lung  VMAT  6X  2  30 / 30  60         2/6/2023 -  Chemotherapy    Treatment Summary   Plan Name: OP NSCLC NIVOLUMAB 360 MG D1 & D22 WITH IPILIMUMAB 1 MG/KG Q6W PLUS CARBOPLATIN (AUC) PACLITAXEL Q3W X2 DOSES  Treatment Goal: Control  Status: Active  Start Date: 2/6/2023  End Date: 2/10/2025 (Planned)  Provider: Javi Avina MD  Chemotherapy: CARBOplatin (PARAPLATIN) 525 mg in sodium chloride 0.9% 337.5 mL chemo infusion, 525 mg, Intravenous, Clinic/HOD 1 time, 1 of 1 cycle  Administration: 525 mg (2/6/2023), 490 mg (2/27/2023)  PACLitaxeL (TAXOL) 200 mg/m2 = 396 mg in sodium chloride 0.9% 500 mL chemo infusion, 200 mg/m2 = 396 mg (100 % of original dose 200 mg/m2), Intravenous, Clinic/HOD 1 time, 1 of 1 cycle  Dose modification: 200 mg/m2 (original dose 200 mg/m2, Cycle 1), 200 mg/m2 (original dose 200 mg/m2, Cycle 1)  Administration: 396 mg (2/6/2023), 396 mg (2/27/2023)          Interval History:   Today, patient reports feeling well overall. Denies significant SOB, but did use rescue inhaler after long walk today in the heat. She has been active, energy is improved.     ROS:  Review of Systems   Constitutional:  Negative for chills, fever and weight loss.   HENT:  Negative for hearing loss, nosebleeds and sore throat.    Eyes:   Negative for blurred vision and double vision.   Respiratory:  Negative for cough, hemoptysis and shortness of breath.    Cardiovascular:  Negative for chest pain and leg swelling.   Gastrointestinal:  Positive for diarrhea (occassional (d/t gallbladder removal)). Negative for abdominal pain, blood in stool, heartburn, nausea and vomiting.   Genitourinary:  Negative for dysuria, frequency and hematuria.   Musculoskeletal:  Negative for back pain, joint pain and myalgias.   Skin:  Negative for itching and rash.   Neurological:  Negative for dizziness, tingling, sensory change, speech change and headaches.   Endo/Heme/Allergies:  Does not bruise/bleed easily.   Psychiatric/Behavioral:  The patient is not nervous/anxious.         A complete 12-point review of systems was reviewed and is negative except as mentioned above.     Past Medical History:   Past Medical History:   Diagnosis Date    AICD (automatic cardioverter/defibrillator) present     Asthma     bronchitis in past    Breast cancer 2016    right    Cardiac pacemaker     Cardiomyopathy     COPD (chronic obstructive pulmonary disease)     Diabetes mellitus, type 2     Hyperglycemia     Hyperlipidemia     Hypertension     Malignant neoplasm of overlapping sites of female breast 2/12/2016    Nuclear sclerosis of both eyes 8/12/2020    Respiratory distress 3/12/2020        Allergies:   Review of patient's allergies indicates:   Allergen Reactions    Taxol [paclitaxel]      Hypersensitivity reaction to taxol, symptoms included shortness of breath, nausea, dizziness, flushing     Carboplatin Other (See Comments)     Itching and hives    Adhesive Rash     tegaderm burns and blistered skin        Medications:   Current Outpatient Medications   Medication Sig Dispense Refill    ACCU-CHEK ALFRED PLUS TEST STRP Strp USE TO TEST THREE TIMES DAILY 200 strip 3    albuterol sulfate (PROAIR RESPICLICK) 90 mcg/actuation AePB Inhale 2 puffs into the lungs every 4 (four) hours  as needed. Rescue 1 each 5    amLODIPine (NORVASC) 5 MG tablet TAKE 1 TABLET BY MOUTH EVERY DAY (Patient taking differently: Take 5 mg by mouth 2 (two) times daily.) 90 tablet 3    atorvastatin (LIPITOR) 10 MG tablet Take 1 tablet (10 mg total) by mouth once daily. 90 tablet 3    buPROPion (WELLBUTRIN XL) 150 MG TB24 tablet Take 1 tablet (150 mg total) by mouth once daily. 90 tablet 0    CARBOplatin (PARAPLATIN) 10 mg/mL injection       cetirizine (ZYRTEC) 10 MG tablet Take 10 mg by mouth every evening.       CINVANTI 7.2 mg/mL Emul injection       dexAMETHasone (DECADRON) 4 mg/mL injection       diphenhydrAMINE (BENADRYL) 50 mg/mL injection       losartan (COZAAR) 100 MG tablet Take 1 tablet (100 mg total) by mouth once daily. 90 tablet 2    metFORMIN (GLUCOPHAGE) 500 MG tablet Take 2 tablets (1,000 mg total) by mouth 2 (two) times daily with meals. 360 tablet 3    metoprolol succinate (TOPROL-XL) 100 MG 24 hr tablet Take 1 tablet (100 mg total) by mouth once daily. 90 tablet 3    multivitamin (THERAGRAN) per tablet Take 1 tablet by mouth once daily.      mupirocin (BACTROBAN) 2 % ointment Apply topically 3 (three) times daily. 30 g 1    OLANZapine (ZYPREXA) 5 MG tablet Take 1 tablet (5 mg total) by mouth every evening. Take 1 tablet by mouth every evening on days 1-4 of each chemotherapy cycle 30 tablet 1    ondansetron (ZOFRAN-ODT) 8 MG TbDL Take 1 tablet (8 mg total) by mouth every 8 (eight) hours as needed (nausea/vomiting). Take 1 tablet (8 mg) by mouth every 8 hours as needed for nausea/vomiting. 60 tablet 5    OPDIVO 120 mg/12 mL Soln       OPDIVO 240 mg/24 mL Soln       OPDIVO 40 mg/4 mL injection       palonosetron (ALOXI) 0.25 mg/5 mL injection       pantoprazole (PROTONIX) 40 MG tablet Take 1 tablet (40 mg total) by mouth once daily. 90 tablet 3    predniSONE (DELTASONE) 10 MG tablet Take 1 tablet (10 mg total) by mouth once daily. Take 4 tabs x 3 days, then take 2 tabs x 3 days, then take 1 tab x 3  "days. 21 tablet 0    prochlorperazine (COMPAZINE) 10 MG tablet Take 1 tablet (10 mg total) by mouth every 6 (six) hours as needed (nausea/vomiting - second choice). 30 tablet 1    READI-CAT 2 2 % (w/v) suspension       semaglutide (OZEMPIC) 0.25 mg or 0.5 mg(2 mg/1.5 mL) pen injector Take 0.25 mg for 4 weeks, then increase to 0.5 mg weekly 4 pen 3    sertraline (ZOLOFT) 100 MG tablet TAKE 1 TABLET BY MOUTH EVERY EVENING. 90 tablet 3    tiotropium (SPIRIVA WITH HANDIHALER) 18 mcg inhalation capsule INHALE 1 CAPSULE BY MOUTH EVERY DAY. 90 capsule 3    triamcinolone acetonide 0.1% (KENALOG) 0.1 % cream Apply topically 2 (two) times daily. 453.6 g 2    WIXELA INHUB 250-50 mcg/dose diskus inhaler INHALE 1 PUFF INTO THE LUNGS 2 (TWO) TIMES DAILY. CONTROLLER 180 each 3    YERVOY 50 mg/10 mL (5 mg/mL)        No current facility-administered medications for this visit.     Facility-Administered Medications Ordered in Other Visits   Medication Dose Route Frequency Provider Last Rate Last Admin    fentaNYL injection 25 mcg  25 mcg Intravenous Q5 Min PRN Keesha Martins MD        haloperidol lactate injection 0.5 mg  0.5 mg Intravenous Once PRN Keesha Martins MD        HYDROmorphone injection 0.2 mg  0.2 mg Intravenous Q5 Min PRN Keesha Martins MD        ondansetron injection 4 mg  4 mg Intravenous Once PRN Keesha Martins MD        sodium chloride 0.9% flush 10 mL  10 mL Intravenous PRN Keehsa Martins MD            Physical Exam:   /61 (BP Location: Left arm, Patient Position: Sitting, BP Method: Medium (Automatic))   Pulse 103   Temp 97.9 °F (36.6 °C) (Oral)   Resp 18   Ht 5' 2" (1.575 m)   Wt 82.1 kg (181 lb)   LMP  (LMP Unknown)   SpO2 95%   BMI 33.10 kg/m²      ECOG Performance Status: (foot note - ECOG PS provided by Eastern Cooperative Oncology Group) 0 - Asymptomatic    Physical Exam  Vitals and nursing note reviewed.   Constitutional:       Appearance: Normal appearance. She is well-developed and normal weight.   HENT:      " Head: Normocephalic and atraumatic.   Eyes:      Conjunctiva/sclera: Conjunctivae normal.      Pupils: Pupils are equal, round, and reactive to light.   Pulmonary:      Effort: Pulmonary effort is normal. No respiratory distress.   Abdominal:      General: There is no distension.      Palpations: Abdomen is soft.   Musculoskeletal:         General: No swelling. Normal range of motion.      Cervical back: Normal range of motion and neck supple.      Left ankle: Swelling (trace) present.   Lymphadenopathy:      Cervical: No cervical adenopathy.   Skin:     General: Skin is warm and dry.      Findings: Rash (scalp) present.      Comments: Honey crusted lesions to scalp   Neurological:      General: No focal deficit present.      Mental Status: She is alert and oriented to person, place, and time.   Psychiatric:         Mood and Affect: Mood and affect normal.         Behavior: Behavior normal.               Labs:   Recent Results (from the past 48 hour(s))   CBC W/ AUTO DIFFERENTIAL    Collection Time: 07/03/23 10:45 AM   Result Value Ref Range    WBC 5.72 3.90 - 12.70 K/uL    RBC 4.20 4.00 - 5.40 M/uL    Hemoglobin 11.9 (L) 12.0 - 16.0 g/dL    Hematocrit 37.0 37.0 - 48.5 %    MCV 88 82 - 98 fL    MCH 28.3 27.0 - 31.0 pg    MCHC 32.2 32.0 - 36.0 g/dL    RDW 16.9 (H) 11.5 - 14.5 %    Platelets 199 150 - 450 K/uL    MPV 12.0 9.2 - 12.9 fL    Immature Granulocytes 0.9 (H) 0.0 - 0.5 %    Gran # (ANC) 4.5 1.8 - 7.7 K/uL    Immature Grans (Abs) 0.05 (H) 0.00 - 0.04 K/uL    Lymph # 0.5 (L) 1.0 - 4.8 K/uL    Mono # 0.6 0.3 - 1.0 K/uL    Eos # 0.1 0.0 - 0.5 K/uL    Baso # 0.05 0.00 - 0.20 K/uL    nRBC 0 0 /100 WBC    Gran % 78.1 (H) 38.0 - 73.0 %    Lymph % 8.4 (L) 18.0 - 48.0 %    Mono % 10.3 4.0 - 15.0 %    Eosinophil % 1.4 0.0 - 8.0 %    Basophil % 0.9 0.0 - 1.9 %    Differential Method Automated    CMP    Collection Time: 07/03/23 10:45 AM   Result Value Ref Range    Sodium 143 136 - 145 mmol/L    Potassium 4.7 3.5 - 5.1  mmol/L    Chloride 107 95 - 110 mmol/L    CO2 25 23 - 29 mmol/L    Glucose 115 (H) 70 - 110 mg/dL    BUN 19 8 - 23 mg/dL    Creatinine 1.0 0.5 - 1.4 mg/dL    Calcium 10.2 8.7 - 10.5 mg/dL    Total Protein 7.5 6.0 - 8.4 g/dL    Albumin 3.6 3.5 - 5.2 g/dL    Total Bilirubin 0.4 0.1 - 1.0 mg/dL    Alkaline Phosphatase 52 (L) 55 - 135 U/L    AST 20 10 - 40 U/L    ALT 15 10 - 44 U/L    eGFR >60.0 >60 mL/min/1.73 m^2    Anion Gap 11 8 - 16 mmol/L   TSH    Collection Time: 07/03/23 10:45 AM   Result Value Ref Range    TSH 2.812 0.400 - 4.000 uIU/mL   FREE T4    Collection Time: 07/03/23 10:45 AM   Result Value Ref Range    Free T4 0.94 0.71 - 1.51 ng/dL            Imaging:   Cardiac device check - Remote  Additional Comments  REMOTE Interrogation   ReportPresenting egram demonstrates:  AS/BiV Paced  Autocapture Algorithms:  RA and RV are on, LV is off  Device fxn WNL  RA pacing  0%, V pacing  96%  Atrial arrhythmias:  none  Ventricular arrhythmias:  none  Battery Longevity/Status:  13 months  Resume remote transmission in 3 months  Return to device clinic in May 2024    Report prepared by: Maria Ines JONES.            Diagnoses:       1. NSCLC of right lung    2. Radiation pneumonitis    3. History of breast cancer in female    4. Dilated cardiomyopathy    5. Left bundle-branch block    6. Cardiac resynchronization therapy defibrillator (CRT-D) in place    7. Essential hypertension        Assessment and Plan:         1. Recurrent NSCLC  Plan is for definitive chemoRT, will need re-staging scans prior.  Reviewed with patient in detail her diagnosis, stage, treatment options, and prognosis (3 year OS 66% vs. 43.5% without durvalumab) with standard of care chemoRT followed by maintenance immunotherapy.  Patient has consented for [id number] clinical trial, a randomized control trial evaluating combination chemoRT + durvalumab followed by maintenance vs. SOC chemoRT -> maintenance.  CT scans 7/2021 with CR.  - patient randomized on [id number] to  SOC arm (Imfinzi) - completed 12/2021.    - CT scan 12/2022 with progressing pulmonary nodule in LLL, still with stable disease in RLL consolidation. PET scan also showing enlarging right lower lobe mass with increased hypermetabolic activity concerning for recurrence. Will present her case at the next thoracic tumor board to discuss biopsy and possible treatment options.   - Biopsy of lung mass consistent with SCC. Initiated on 9[facility]. Initial re-staging scans with stable disease.   - With mild inflammation on CT chest, holding IO after consolidative RT (5/29-6/16) to avoid re-inducing radiation pneumonitis. Will consider re-initiation if she has progression of disease. Re-staging scans 7/31 and will schedule next cycle in case treatment is indicated.    2. Grade 2 initially, now resolved. There was increase in GGOs on recent CT, but patient is asymptomatic. Continue COPD maintenance inhalers and she has duonebs prn. Holding IO during RT to chest     3. 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      4-7.  Stable, follows with cardiology. AICD placed, but patient now has recovered EF and only takes lasix prn.  NYHA class I.    Patient is in agreement with the proposed treatment plan. All questions were answered to the patient's satisfaction. Pt knows to call clinic if anything is needed before the next clinic visit.    MDM includes:    - Acute or chronic illness or injury that poses a threat to life or bodily function  - Independent review and explanation of 2 results from unique tests  - Discussion of management and ordering 2 unique tests  - Extensive discussion of treatment and management  - Prescription drug management  - Drug therapy requiring intensive monitoring for toxicity    Javi Avina M.D.  Hematology/Oncology Attending  Director [facility] Program  [facility]              Route Chart for Scheduling    Med Onc Chart Routing      Follow up with physician  Other. RTC 8/1 with CBC, cMP, TSh, free T4 prior to next Yervoy and Opdivo   Follow up with DANIEL    Infusion scheduling note   8/1 after clinic visit   Injection scheduling note    Labs CBC, CMP, TSH and free T4   Scheduling:  Preferred lab:  Lab interval:     Imaging CT chest abdomen pelvis   schedule 7/[facility] on [facility]   Pharmacy appointment    Other referrals          Treatment Plan Information   OP NSCLC NIVOLUMAB 360 MG D1 & D22 WITH IPILIMUMAB 1 MG/KG Q6W PLUS CARBOPLATIN (AUC) PACLITAXEL Q3W X2 DOSES   Javi Avina MD   Upcoming Treatment Dates - OP NSCLC NIVOLUMAB 360 MG D1 & D22 WITH IPILIMUMAB 1 MG/KG Q6W PLUS CARBOPLATIN (AUC) PACLITAXEL Q3W X2 DOSES    7/24/2023       Immunotherapy       nivolumab 360 mg in sodium chloride 0.9% 86 mL infusion       ipilimumab (YERVOY) 1 mg/kg = 91 mg in sodium chloride 0.9% 68.2 mL chemo infusion  8/14/2023       Immunotherapy       nivolumab 360 mg in sodium chloride 0.9% 86 mL infusion  9/4/2023       Immunotherapy       nivolumab 360 mg in sodium chloride 0.9% 86 mL infusion       ipilimumab (YERVOY) 1 mg/kg = 91 mg in sodium chloride 0.9% 68.2 mL chemo infusion  9/25/2023       Immunotherapy       nivolumab 360 mg in sodium chloride 0.9% 86 mL infusion

## 2023-07-03 NOTE — TELEPHONE ENCOUNTER
"----- Message from Spring Owens sent at 7/3/2023 11:11 AM CDT -----  Regarding: Pt advice  Contact: Pt     Pt requesting call back in regards to appt scheduled @ 2pm. Pt would like to know if she can come in sooner.   Please call and adv @       Confirmed contact below:   Contact Name: Hannah Montoya  Phone Number: [phone]               Additional Notes:  "Thank you for all that you do for our patients"                                           "

## 2023-07-05 ENCOUNTER — HOSPITAL ENCOUNTER (EMERGENCY)
Facility: HOSPITAL | Age: 68
Discharge: HOME | End: 2023-07-05
Attending: EMERGENCY MEDICINE
Payer: MEDICARE

## 2023-07-05 VITALS
SYSTOLIC BLOOD PRESSURE: 132 MMHG | DIASTOLIC BLOOD PRESSURE: 81 MMHG | WEIGHT: 181 LBS | HEART RATE: 84 BPM | OXYGEN SATURATION: 98 % | RESPIRATION RATE: 20 BRPM | TEMPERATURE: 98 F | HEIGHT: 62 IN | BODY MASS INDEX: 33.31 KG/M2

## 2023-07-05 DIAGNOSIS — R11.0 NAUSEA: ICD-10-CM

## 2023-07-05 DIAGNOSIS — R00.0 TACHYCARDIA: ICD-10-CM

## 2023-07-05 DIAGNOSIS — E83.39 HYPOPHOSPHATEMIA: ICD-10-CM

## 2023-07-05 DIAGNOSIS — E83.42 HYPOMAGNESEMIA: ICD-10-CM

## 2023-07-05 DIAGNOSIS — E86.0 DEHYDRATION: Primary | ICD-10-CM

## 2023-07-05 LAB
ALBUMIN SERPL BCP-MCNC: 3.6 G/DL (ref 3.5–5.2)
ALP SERPL-CCNC: 48 U/L (ref 55–135)
ALT SERPL W/O P-5'-P-CCNC: 16 U/L (ref 10–44)
ANION GAP SERPL CALC-SCNC: 14 MMOL/L (ref 8–16)
AST SERPL-CCNC: 21 U/L (ref 10–40)
BACTERIA #/AREA URNS AUTO: ABNORMAL /HPF
BASOPHILS # BLD AUTO: 0.04 K/UL (ref 0–0.2)
BASOPHILS NFR BLD: 0.6 % (ref 0–1.9)
BILIRUB SERPL-MCNC: 0.4 MG/DL (ref 0.1–1)
BILIRUB UR QL STRIP: ABNORMAL
BNP SERPL-MCNC: 34 PG/ML (ref 0–99)
BUN SERPL-MCNC: 17 MG/DL (ref 8–23)
CALCIUM SERPL-MCNC: 9.7 MG/DL (ref 8.7–10.5)
CAOX CRY UR QL COMP ASSIST: ABNORMAL
CHLORIDE SERPL-SCNC: 105 MMOL/L (ref 95–110)
CK SERPL-CCNC: 34 U/L (ref 20–180)
CLARITY UR REFRACT.AUTO: ABNORMAL
CO2 SERPL-SCNC: 21 MMOL/L (ref 23–29)
COLOR UR AUTO: YELLOW
CREAT SERPL-MCNC: 1.2 MG/DL (ref 0.5–1.4)
D DIMER PPP IA.FEU-MCNC: 0.62 MG/L FEU
DIFFERENTIAL METHOD: ABNORMAL
EOSINOPHIL # BLD AUTO: 0 K/UL (ref 0–0.5)
EOSINOPHIL NFR BLD: 0.6 % (ref 0–8)
ERYTHROCYTE [DISTWIDTH] IN BLOOD BY AUTOMATED COUNT: 17 % (ref 11.5–14.5)
EST. GFR  (NO RACE VARIABLE): 50.2 ML/MIN/1.73 M^2
GLUCOSE SERPL-MCNC: 111 MG/DL (ref 70–110)
GLUCOSE UR QL STRIP: NEGATIVE
HCT VFR BLD AUTO: 37.3 % (ref 37–48.5)
HGB BLD-MCNC: 11.5 G/DL (ref 12–16)
HGB UR QL STRIP: NEGATIVE
HYALINE CASTS UR QL AUTO: 233 /LPF
IMM GRANULOCYTES # BLD AUTO: 0.07 K/UL (ref 0–0.04)
IMM GRANULOCYTES NFR BLD AUTO: 1 % (ref 0–0.5)
KETONES UR QL STRIP: ABNORMAL
LEUKOCYTE ESTERASE UR QL STRIP: ABNORMAL
LYMPHOCYTES # BLD AUTO: 0.5 K/UL (ref 1–4.8)
LYMPHOCYTES NFR BLD: 7.2 % (ref 18–48)
MAGNESIUM SERPL-MCNC: 1.1 MG/DL (ref 1.6–2.6)
MCH RBC QN AUTO: 27.3 PG (ref 27–31)
MCHC RBC AUTO-ENTMCNC: 30.8 G/DL (ref 32–36)
MCV RBC AUTO: 89 FL (ref 82–98)
MICROSCOPIC COMMENT: ABNORMAL
MONOCYTES # BLD AUTO: 0.6 K/UL (ref 0.3–1)
MONOCYTES NFR BLD: 9.1 % (ref 4–15)
NEUTROPHILS # BLD AUTO: 5.6 K/UL (ref 1.8–7.7)
NEUTROPHILS NFR BLD: 81.5 % (ref 38–73)
NITRITE UR QL STRIP: NEGATIVE
NON-SQ EPI CELLS #/AREA URNS AUTO: 1 /HPF
NRBC BLD-RTO: 0 /100 WBC
PH UR STRIP: 6 [PH] (ref 5–8)
PHOSPHATE SERPL-MCNC: 2.4 MG/DL (ref 2.7–4.5)
PLATELET # BLD AUTO: 188 K/UL (ref 150–450)
PMV BLD AUTO: 12.3 FL (ref 9.2–12.9)
POCT GLUCOSE: 121 MG/DL (ref 70–110)
POTASSIUM SERPL-SCNC: 4.3 MMOL/L (ref 3.5–5.1)
PROT SERPL-MCNC: 7.3 G/DL (ref 6–8.4)
PROT UR QL STRIP: ABNORMAL
RBC # BLD AUTO: 4.21 M/UL (ref 4–5.4)
RBC #/AREA URNS AUTO: 15 /HPF (ref 0–4)
SODIUM SERPL-SCNC: 140 MMOL/L (ref 136–145)
SP GR UR STRIP: 1.03 (ref 1–1.03)
SQUAMOUS #/AREA URNS AUTO: 25 /HPF
TROPONIN I SERPL DL<=0.01 NG/ML-MCNC: 0.02 NG/ML (ref 0–0.03)
URN SPEC COLLECT METH UR: ABNORMAL
WBC # BLD AUTO: 6.92 K/UL (ref 3.9–12.7)
WBC #/AREA URNS AUTO: 86 /HPF (ref 0–5)
YEAST UR QL AUTO: ABNORMAL

## 2023-07-05 PROCEDURE — 82962 GLUCOSE BLOOD TEST: CPT | Mod: HCNC

## 2023-07-05 PROCEDURE — 81001 URINALYSIS AUTO W/SCOPE: CPT | Mod: HCNC | Performed by: STUDENT IN AN ORGANIZED HEALTH CARE EDUCATION/TRAINING PROGRAM

## 2023-07-05 PROCEDURE — 80053 COMPREHEN METABOLIC PANEL: CPT | Mod: HCNC | Performed by: STUDENT IN AN ORGANIZED HEALTH CARE EDUCATION/TRAINING PROGRAM

## 2023-07-05 PROCEDURE — 83735 ASSAY OF MAGNESIUM: CPT | Mod: HCNC | Performed by: STUDENT IN AN ORGANIZED HEALTH CARE EDUCATION/TRAINING PROGRAM

## 2023-07-05 PROCEDURE — 93005 ELECTROCARDIOGRAM TRACING: CPT | Mod: HCNC

## 2023-07-05 PROCEDURE — 96365 THER/PROPH/DIAG IV INF INIT: CPT | Mod: HCNC

## 2023-07-05 PROCEDURE — 87086 URINE CULTURE/COLONY COUNT: CPT | Mod: HCNC | Performed by: STUDENT IN AN ORGANIZED HEALTH CARE EDUCATION/TRAINING PROGRAM

## 2023-07-05 PROCEDURE — 85379 FIBRIN DEGRADATION QUANT: CPT | Mod: HCNC | Performed by: EMERGENCY MEDICINE

## 2023-07-05 PROCEDURE — 82550 ASSAY OF CK (CPK): CPT | Mod: HCNC | Performed by: STUDENT IN AN ORGANIZED HEALTH CARE EDUCATION/TRAINING PROGRAM

## 2023-07-05 PROCEDURE — 25000003 PHARM REV CODE 250: Mod: HCNC | Performed by: STUDENT IN AN ORGANIZED HEALTH CARE EDUCATION/TRAINING PROGRAM

## 2023-07-05 PROCEDURE — 63600175 PHARM REV CODE 636 W HCPCS: Mod: HCNC | Performed by: STUDENT IN AN ORGANIZED HEALTH CARE EDUCATION/TRAINING PROGRAM

## 2023-07-05 PROCEDURE — 84100 ASSAY OF PHOSPHORUS: CPT | Mod: HCNC | Performed by: STUDENT IN AN ORGANIZED HEALTH CARE EDUCATION/TRAINING PROGRAM

## 2023-07-05 PROCEDURE — 85025 COMPLETE CBC W/AUTO DIFF WBC: CPT | Mod: HCNC | Performed by: STUDENT IN AN ORGANIZED HEALTH CARE EDUCATION/TRAINING PROGRAM

## 2023-07-05 PROCEDURE — 99284 EMERGENCY DEPT VISIT MOD MDM: CPT | Mod: 25,HCNC

## 2023-07-05 PROCEDURE — 96366 THER/PROPH/DIAG IV INF ADDON: CPT | Mod: HCNC

## 2023-07-05 PROCEDURE — 84484 ASSAY OF TROPONIN QUANT: CPT | Mod: HCNC | Performed by: STUDENT IN AN ORGANIZED HEALTH CARE EDUCATION/TRAINING PROGRAM

## 2023-07-05 PROCEDURE — 83880 ASSAY OF NATRIURETIC PEPTIDE: CPT | Mod: HCNC | Performed by: STUDENT IN AN ORGANIZED HEALTH CARE EDUCATION/TRAINING PROGRAM

## 2023-07-05 PROCEDURE — 93010 EKG 12-LEAD: ICD-10-PCS | Mod: HCNC,,, | Performed by: INTERNAL MEDICINE

## 2023-07-05 PROCEDURE — 93010 ELECTROCARDIOGRAM REPORT: CPT | Mod: HCNC,,, | Performed by: INTERNAL MEDICINE

## 2023-07-05 PROCEDURE — 96361 HYDRATE IV INFUSION ADD-ON: CPT | Mod: HCNC

## 2023-07-05 RX ORDER — NITROFURANTOIN 25; 75 MG/1; MG/1
100 CAPSULE ORAL 2 TIMES DAILY
Qty: 10 CAPSULE | Refills: 0 | Status: SHIPPED | OUTPATIENT
Start: 2023-07-05 | End: 2023-07-10

## 2023-07-05 RX ORDER — NITROFURANTOIN 25; 75 MG/1; MG/1
100 CAPSULE ORAL 2 TIMES DAILY
Qty: 10 CAPSULE | Refills: 0 | Status: SHIPPED | OUTPATIENT
Start: 2023-07-05 | End: 2023-07-05 | Stop reason: SDUPTHER

## 2023-07-05 RX ORDER — FLUCONAZOLE 150 MG/1
150 TABLET ORAL DAILY
Qty: 1 TABLET | Refills: 0 | Status: SHIPPED | OUTPATIENT
Start: 2023-07-05 | End: 2023-07-05 | Stop reason: SDUPTHER

## 2023-07-05 RX ORDER — SODIUM,POTASSIUM PHOSPHATES 280-250MG
2 POWDER IN PACKET (EA) ORAL ONCE
Status: COMPLETED | OUTPATIENT
Start: 2023-07-05 | End: 2023-07-05

## 2023-07-05 RX ORDER — FLUCONAZOLE 150 MG/1
150 TABLET ORAL DAILY
Qty: 1 TABLET | Refills: 0 | Status: SHIPPED | OUTPATIENT
Start: 2023-07-05 | End: 2023-07-06

## 2023-07-05 RX ORDER — MAGNESIUM SULFATE HEPTAHYDRATE 40 MG/ML
2 INJECTION, SOLUTION INTRAVENOUS
Status: COMPLETED | OUTPATIENT
Start: 2023-07-05 | End: 2023-07-05

## 2023-07-05 RX ADMIN — SODIUM CHLORIDE, POTASSIUM CHLORIDE, SODIUM LACTATE AND CALCIUM CHLORIDE 500 ML: 600; 310; 30; 20 INJECTION, SOLUTION INTRAVENOUS at 03:07

## 2023-07-05 RX ADMIN — POTASSIUM & SODIUM PHOSPHATES POWDER PACK 280-160-250 MG 2 PACKET: 280-160-250 PACK at 03:07

## 2023-07-05 RX ADMIN — MAGNESIUM SULFATE 2 G: 2 INJECTION INTRAVENOUS at 04:07

## 2023-07-05 RX ADMIN — SODIUM CHLORIDE, POTASSIUM CHLORIDE, SODIUM LACTATE AND CALCIUM CHLORIDE 500 ML: 600; 310; 30; 20 INJECTION, SOLUTION INTRAVENOUS at 02:07

## 2023-07-05 NOTE — ED TRIAGE NOTES
Patient presents to the ED via EMS with complaints of new onset generalized weakness and nausea. Patient has a past medical history of NSCLC, HTN, HLD, DM-2, COPD. Patient denies any vomiting. Endorses headache 10 out of 10.

## 2023-07-05 NOTE — DISCHARGE INSTRUCTIONS
Home Care Instructions:  - If you begin to have symptoms of painful urination, difficulty urinating or decreased urination suspicious for a UTI, take Macrobid for 5 days.  - If you start to have abnormal vaginal discharge or vaginal itching concerning for a yeast infection, take 1 dose of fluconazole.  - It is very important that you stay hydrated, especially when side for prolonged periods of time.    Follow-Up Plan:  - Follow-up with: Primary care doctor within 3  days  - Additional testing and/or evaluation will be directed by your primary doctor    Return to the Emergency Department for symptoms including but not limited to: worsening symptoms, severe back pain, shortness of breath or chest pain, vomiting with inability to hold down fluids, blood in vomit or poop, fevers greater than 100.4°F, passing out/fainting/unconsciousness, or other concerning symptoms.

## 2023-07-05 NOTE — ED PROVIDER NOTES
Encounter Date: 7/5/2023       History     Chief Complaint   Patient presents with    Nausea     Arrived via ems from store with c/o nausea and generalized weakness onset today     67-year-old female with past medical history of NSCLC on immunotherapy, HTN, HLD, DM-2, COPD presenting to ED after episode of weakness and nausea.  Patient states that she was at the store and had been walking around for a few minutes when she suddenly felt generalized weakness associated with nausea.  She went home and took a Zofran which improved her nausea symptoms.  She states that she was drinking ice tea all day yesterday which is unusual for her.  She did not drink any water and reports decreased urination.  She had her AICD interrogated earlier this week.  She denies fevers, chills, diarrhea, chest pain cough or shortness of breath.      Review of patient's allergies indicates:   Allergen Reactions    Taxol [paclitaxel]      Hypersensitivity reaction to taxol, symptoms included shortness of breath, nausea, dizziness, flushing     Carboplatin Other (See Comments)     Itching and hives    Adhesive Rash     tegaderm burns and blistered skin     Past Medical History:   Diagnosis Date    AICD (automatic cardioverter/defibrillator) present     Asthma     bronchitis in past    Breast cancer 2016    right    Cardiac pacemaker     Cardiomyopathy     COPD (chronic obstructive pulmonary disease)     Diabetes mellitus, type 2     Hyperglycemia     Hyperlipidemia     Hypertension     Malignant neoplasm of overlapping sites of female breast 2/12/2016    Nuclear sclerosis of both eyes 8/12/2020    Respiratory distress 3/12/2020     Past Surgical History:   Procedure Laterality Date    BIOPSY, WITH CT GUIDANCE Right 1/24/2023    Procedure: LUNG MASS BIOPSY, WITH CT GUIDANCE;  Surgeon: Yehuda Green MD;  Location: [facility] CATH LAB;  Service: Radiology;  Laterality: Right;    BREAST BIOPSY Right 2/2016    IDC    BREAST LUMPECTOMY Right      CARDIAC CATHETERIZATION Bilateral 2017    CARDIAC DEFIBRILLATOR PLACEMENT Left 08/10/2017    CARDIAC DEFIBRILLATOR PLACEMENT Left 2018     SECTION      x2    CHOLECYSTECTOMY      INSERTION OF TUNNELED CENTRAL VENOUS CATHETER (CVC) WITH SUBCUTANEOUS PORT Right 2020    Procedure: [id number]-[id number]-P-CATH, RIGHT;  Surgeon: Josefa Caceres MD;  Location: [address];  Service: General;  Laterality: Right;  Port-a-cath placed to R. IJ    LUNG BIOPSY N/A 2020    Procedure: BIOPSY, LUNG;  [facility];  Location: [facility];  Service: Radiology;  Laterality: N/A;  8AM START  RN PREOP 2020---COVID NEGATIVE    NAVIGATIONAL BRONCHOSCOPY N/A 10/13/2020    Procedure: BRONCHOSCOPY, NAVIGATIONAL;  Surgeon: Jamilah Weaver MD;  Location: [address];  Service: Pulmonary;  Laterality: N/A;    REVISION OF IMPLANTABLE CARDIOVERTER-DEFIBRILLATOR (ICD) ELECTRODE LEAD PLACEMENT N/A 6/15/2018    Procedure: REVISION-LEAD-ICD;  Surgeon: Javy Gates MD;  Location: [facility] CATH LAB;  Service: Cardiology;  Laterality: N/A;  LBBB, Bi-V ICD HIS Elmo rev, MDT, Choice, MB, 3 Prep    ROBOT-ASSISTED LAPAROSCOPIC LYMPHADENECTOMY USING DA SALVADOR XI Right 10/23/2020    Procedure: XI ROBOTIC LYMPHADENECTOMY;  Surgeon: Ramo Tucker MD;  Location: [address];  Service: Thoracic;  Laterality: Right;    TUBAL LIGATION       Family History   Problem Relation Age of Onset    Kidney disease Mother     Cataracts Mother     Kidney disease Father     Cataracts Father     Clotting disorder Brother     No Known Problems Daughter     No Known Problems Son     No Known Problems Sister     No Known Problems Maternal Aunt     No Known Problems Maternal Uncle     No Known Problems Paternal Aunt     No Known Problems Paternal Uncle     Macular degeneration Maternal Grandmother     No Known Problems Maternal Grandfather     No Known Problems Paternal Grandmother     No Known Problems Paternal  Grandfather     Breast cancer Neg Hx     Ovarian cancer Neg Hx     Amblyopia Neg Hx     Blindness Neg Hx     Cancer Neg Hx     Diabetes Neg Hx     Glaucoma Neg Hx     Hypertension Neg Hx     Retinal detachment Neg Hx     Strabismus Neg Hx     Stroke Neg Hx     Thyroid disease Neg Hx      Social History     Tobacco Use    Smoking status: Former     Packs/day: 0.50     Years: 40.00     Pack years: 20.00     Types: Cigarettes     Quit date: 2016     Years since quittin.9     Passive exposure: Past    Smokeless tobacco: Never   Substance Use Topics    Alcohol use: Yes     Alcohol/week: 1.0 standard drink     Types: 1 Glasses of wine per week     Comment: rare- holiday    Drug use: No     Review of Systems   Constitutional:  Positive for fatigue. Negative for chills and fever.   HENT:  Negative for congestion and rhinorrhea.    Eyes:  Negative for pain and visual disturbance.   Respiratory:  Negative for cough and shortness of breath.    Cardiovascular:  Negative for chest pain and palpitations.   Gastrointestinal:  Positive for nausea. Negative for abdominal pain and vomiting.   Genitourinary:  Positive for decreased urine volume. Negative for difficulty urinating and dysuria.   Musculoskeletal:  Negative for gait problem and joint swelling.   Skin:  Negative for rash and wound.   Neurological:  Negative for numbness and headaches.     Physical Exam     Initial Vitals [23 1343]   BP Pulse Resp Temp SpO2   (!) 120/90 (!) 138 20 98.3 °F (36.8 °C) 98 %      MAP       --         Physical Exam    Nursing note and vitals reviewed.  Constitutional: She is not diaphoretic. No distress.   HENT:   Head: Normocephalic and atraumatic.   Mouth/Throat: Oropharynx is clear and moist.   Eyes: Conjunctivae and EOM are normal.   Neck: Neck supple.   Normal range of motion.  Cardiovascular:  Normal rate, regular rhythm and normal heart sounds.           Pulmonary/Chest: Breath sounds normal. She has no wheezes. She has no  rhonchi. She has no rales.   Abdominal: Abdomen is soft. She exhibits no distension. There is no abdominal tenderness.   Musculoskeletal:         General: No edema. Normal range of motion.      Cervical back: Normal range of motion and neck supple.     Neurological: She is alert and oriented to person, place, and time. She has normal strength. No sensory deficit.   Skin: Skin is warm and dry. Capillary refill takes 2 to 3 seconds.       ED Course   Procedures  Labs Reviewed   CBC W/ AUTO DIFFERENTIAL - Abnormal; Notable for the following components:       Result Value    Hemoglobin 11.5 (*)     MCHC 30.8 (*)     RDW 17.0 (*)     Immature Granulocytes 1.0 (*)     Immature Grans (Abs) 0.07 (*)     Lymph # 0.5 (*)     Gran % 81.5 (*)     Lymph % 7.2 (*)     All other components within normal limits    Narrative:     add on cpk per Anne Marie Saffaei D order# [id number] 07/05/2023 @ 15:04    COMPREHENSIVE METABOLIC PANEL - Abnormal; Notable for the following components:    CO2 21 (*)     Glucose 111 (*)     Alkaline Phosphatase 48 (*)     eGFR 50.2 (*)     All other components within normal limits    Narrative:     add on cpk per Anne Marie [employer]sens[employer]aei D order# [id number] 07/05/2023 @ 15:04    MAGNESIUM - Abnormal; Notable for the following components:    Magnesium 1.1 (*)     All other components within normal limits    Narrative:     add on cpk per Anne Marie [employer]ae[employer] D order# [id number] 07/05/2023 @ 15:04    PHOSPHORUS - Abnormal; Notable for the following components:    Phosphorus 2.4 (*)     All other components within normal limits    Narrative:     add on cpk per Anne Marie Saffaei D order# [id number] 07/05/2023 @ 15:04    URINALYSIS, REFLEX TO URINE CULTURE - Abnormal; Notable for the following components:    Appearance, UA Hazy (*)     Protein, UA 3+ (*)     Ketones, UA Trace (*)     Bilirubin (UA) 1+ (*)     Leukocytes, UA 3+ (*)     All other components within normal limits    Narrative:     Specimen Source->Urine   D DIMER, QUANTITATIVE  - Abnormal; Notable for the following components:    D-Dimer 0.62 (*)     All other components within normal limits   URINALYSIS MICROSCOPIC - Abnormal; Notable for the following components:    RBC, UA 15 (*)     WBC, UA 86 (*)     Bacteria Moderate (*)     Yeast, UA Occasional (*)     Non-Squam Epith 1 (*)     Hyaline Casts,  (*)     All other components within normal limits    Narrative:     Specimen Source->Urine   CULTURE, URINE   TROPONIN I    Narrative:     add on cpk per Anne Marie Saffaei D order# [id number] 07/05/2023 @ 15:04    B-TYPE NATRIURETIC PEPTIDE    Narrative:     add on cpk per Anne Marie Saffaei D order# [id number] 07/05/2023 @ 15:04    CK    Narrative:     add on cpk per Anne Marie Saffaei D order# [id number] 07/05/2023 @ 15:04      EKG Readings: (Independently Interpreted)   Atrial-sensed, ventricular paced rhythm at 120 bpm. Normal axis. No STEMI.   ECG Results              EKG 12-lead (Final result)  Result time 07/05/23 14:06:57      Final result by Interface, Lab In [facility] (07/05/23 14:06:57)                   Narrative:    Test Reason : R11.0,    Vent. Rate : 120 BPM     Atrial Rate : 120 BPM     P-R Int : 134 ms          QRS Dur : 084 ms      QT Int : 318 ms       P-R-T Axes : 060 015 177 degrees     QTc Int : 449 ms    Atrial-sensed ventricular-paced rhythm  Abnormal ECG  When compared with ECG of 27-MAY-2023 13:53,  Vent. rate has increased BY  19 BPM  Confirmed by Jonny LUCAS, Javy FAJARDO ([id number]) on 7/5/2023 2:06:46 PM    Referred By: JOHNNY   SELF           Confirmed By:Javy Fuller MD                                  Imaging Results    None          Medications   lactated ringers bolus 500 mL (0 mLs Intravenous Stopped 7/5/23 1556)   magnesium sulfate 2g in water 50mL IVPB (premix) (0 g Intravenous Stopped 7/5/23 1809)   potassium, sodium phosphates 280-160-250 mg packet 2 packet (2 packets Oral Given 7/5/23 1559)   lactated ringers bolus 500 mL (0 mLs Intravenous Stopped 7/5/23 1713)     Medical  Decision Making:   History:   Old Medical Records: I decided to obtain old medical records.  Differential Diagnosis:   PE  Arrhythmia  Electrolyte derangement  Dehydration  Viral illness  Rhabdomyolysis  Independently Interpreted Test(s):   I have ordered and independently interpreted EKG Reading(s) - see summary below       <> Summary of EKG Reading(s): Ventricular-paced sinus tachycardia with narrow complex QRS.  No STEMI  Clinical Tests:   Lab Tests: Ordered and Reviewed  Medical Tests: Ordered and Reviewed  ED Management:  Patient is tachycardic to the 120s to 130s.  She is normotensive. Her lungs are clear to auscultation bilaterally. She has no complaints of chest pain or shortness of breath.  She is satting 90-94%.  CBC, CMP, troponin, BNP, and CPK are within normal limits.  Labs show hypomagnesemia and hypophosphatemia which will be repleted in the ED. Age-adjusted D-dimer is within normal limits, lowering suspicion for PE.  Patient given 1 L IV fluid bolus with significant improvement of her weakness and heart rate.  UA demonstrates multiple hyaline casts consistent with dehydration.  There are also 15 RBCs, 86 WBCs, moderate bacteria, occasional yeast and 25 squamous epithelial cells.  This is suggestive of a contaminated sample.  Given that patient has no symptoms of UTI, history of recurrent UTI or symptoms of yeast infection, she will be given a prepack prescription for fluconazole and Macrobid to be filled if she develops symptoms.  She also states that she is scheduled for routine labs including a UA with her PCP.  She was offered the option to have a repeat urinalysis done in the ED but patient declined present and stated she will follow up with her primary doctor.  Patient discharged home with strict return precautions.  She was advised follow up with her PCP.  Patient verbalized understanding and agreement with plan.  She has a follow up appointment scheduled next week with Cardiology as well.  She  was counseled to stay well-hydrated.  All questions answered.           Attending Attestation:   Physician Attestation Statement for Resident:  As the supervising MD   Physician Attestation Statement: I have personally seen and examined this patient.   I agree with the above history.  -: No convincing evidence of sepsis.  Wells score 2.5 (tachycardia, malignancy), age adjusted d-dimer normal, doubt PE.  Urinalysis more consistent with dehydration given hyaline casts.  Contaminated sample but patient did not wish to provide a repeat urine specimen.  And react provided.   As the supervising MD I agree with the above PE.     As the supervising MD I agree with the above treatment, course, plan, and disposition.                               Clinical Impression:   Final diagnoses:  [R11.0] Nausea  [R00.0] Tachycardia  [E86.0] Dehydration (Primary)  [E83.42] Hypomagnesemia  [E83.39] Hypophosphatemia        ED Disposition Condition    Discharge Stable          ED Prescriptions       Medication Sig Dispense Start Date End Date Auth. Provider    nitrofurantoin, macrocrystal-monohydrate, (MACROBID) 100 MG capsule  (Status: Discontinued) Take 1 capsule (100 mg total) by mouth 2 (two) times daily. for 5 days 10 capsule 7/5/2023 7/5/2023 Anne Marie Young MD    fluconazole (DIFLUCAN) 150 MG Tab  (Status: Discontinued) Take 1 tablet (150 mg total) by mouth once daily. for 1 day 1 tablet 7/5/2023 7/5/2023 Anne Marie Young MD    fluconazole (DIFLUCAN) 150 MG Tab  (Status: Discontinued) Take 1 tablet (150 mg total) by mouth once daily. for 1 day 1 tablet 7/5/2023 7/5/2023 Anne Marie Young MD    nitrofurantoin, macrocrystal-monohydrate, (MACROBID) 100 MG capsule  (Status: Discontinued) Take 1 capsule (100 mg total) by mouth 2 (two) times daily. for 5 days 10 capsule 7/5/2023 7/5/2023 Anne Marie Young MD    fluconazole (DIFLUCAN) 150 MG Tab Take 1 tablet (150 mg total) by mouth once daily. for 1 day 1 tablet 7/5/2023 7/6/2023 Anne Marie Young MD     nitrofurantoin, macrocrystal-monohydrate, (MACROBID) 100 MG capsule Take 1 capsule (100 mg total) by mouth 2 (two) times daily. for 5 days 10 capsule 7/5/2023 7/10/2023 Anne Marie Young MD          Follow-up Information       Follow up With Specialties Details Why Contact Info    Emanuel Chavez MD Family Medicine, Wound Care   [address] [address]  [phone]      [facility] - Emergency Dept Emergency Medicine  As needed, If symptoms worsen [address] [address] [phone]             Anne Marie Young MD  Resident  07/05/23 1808       Joao Arce MD  07/05/23 1921       Joao Arce MD  07/05/23 1942

## 2023-07-06 ENCOUNTER — PATIENT OUTREACH (OUTPATIENT)
Dept: EMERGENCY MEDICINE | Facility: HOSPITAL | Age: 68
End: 2023-07-06
Payer: MEDICARE

## 2023-07-06 DIAGNOSIS — E11.9 TYPE 2 DIABETES MELLITUS WITHOUT COMPLICATION, WITHOUT LONG-TERM CURRENT USE OF INSULIN: ICD-10-CM

## 2023-07-06 DIAGNOSIS — E66.01 CLASS 2 SEVERE OBESITY DUE TO EXCESS CALORIES WITH SERIOUS COMORBIDITY AND BODY MASS INDEX (BMI) OF 36.0 TO 36.9 IN ADULT: ICD-10-CM

## 2023-07-06 LAB
BACTERIA UR CULT: NORMAL
BACTERIA UR CULT: NORMAL

## 2023-07-06 NOTE — PROGRESS NOTES
Ms. Montoya is a patient of Dr. Gates and was last seen in clinic 10/19/2022.      Subjective:   Patient ID:  Hannah Montoya is a 67 y.o. female who presents for follow up of CRT-D  .     HPI:    Ms. Montoya is a 67 y.o. female with NICM, LBBB, CRT-D, HTN, HLD, DM, hx of rt breast CA, lung CA here for annual follow up.    Background:    Hx of NICM, LBBB s/p CRT-D presented initially for second opinion regarding transvenous LV lead placement. She has an attempt at an LV lead 8/10/17 by Dr Lopez. The attempt was complicated by tortuous venous anatomy and inability to pass an LV lead. Venograms show mid and anterolateral branches with tortuous courses. The mid lateral branch was accessed with a 014 wire but the lead could not be advanced. She had an epicardial lead implanted however she has elevated thresholds, 5.0 V @ 1.2 ms. She has expected 1-1.5 y on her battery life giving her overall ~2 years with her current device. She has mild improvement in symptoms. QRS went from 168 to 150 ms with CRT pacing. She is here for a second opinion.     9/2018: Attempt at His bundle lead 5/2/18 resulting in dislodgement. Re-attempt 6/15/18 with successful lead placement. Marked improvement in symptoms. ECG with QRS 84 ms. Normal CRT-D function (His lead in LV port). Echo showed EF of 50-55%.    10/2019: 100% ventricular pacing - LV in His.  Patient feels well with no cardiac complaints.     10/1/2020: Ms. Montoya is doing well from a device perspective with stable lead and device function. No arrhythmia noted. 100% His pacing with narrow QRS. EF 50-55%. No CHF symptoms. Unfortunately she is being evaluated for possible lung CA after concerning PET yesterday. She is scheduled for biopsy later this month. Continue to monitor device.     10/14/2020: Lung CA diagnosis. Completed treatment now under surveillance.     10/19/2022: Ms. Montoya is doing well from a device perspective with stable lead and device function. No  arrhythmia noted. 97% biventricular pacing. No CHF symptoms. DSE 10/2020 showed recovery of LVEF to 60%. She is feeling well.    Update (07/10/2023):    7/5/2023:  Patient is tachycardic to the 120s to 130s.  She is normotensive. Her lungs are clear to auscultation bilaterally. She has no complaints of chest pain or shortness of breath.  She is satting 90-94%.  CBC, CMP, troponin, BNP, and CPK are within normal limits.  Labs show hypomagnesemia and hypophosphatemia which will be repleted in the ED. Age-adjusted D-dimer is within normal limits, lowering suspicion for PE.  Patient given 1 L IV fluid bolus with significant improvement of her weakness and heart rate.  UA demonstrates multiple hyaline casts consistent with dehydration.  There are also 15 RBCs, 86 WBCs, moderate bacteria, occasional yeast and 25 squamous epithelial cells.  This is suggestive of a contaminated sample.  Given that patient has no symptoms of UTI, history of recurrent UTI or symptoms of yeast infection, she will be given a prepack prescription for fluconazole and Macrobid to be filled if she develops symptoms.      Today she says she is feeling better since discharge. Is drinking water for hydration instead of tea. No new SINGH, CP, palps, LH, syncope reported.    Patient received last right lung radiation treatment 6/30/2023    Device Interrogation (7/3/2023) reveals an intrinsic SR/ST with stable lead and device function. No arrhythmias or treated episodes were noted.  She paces <0.1% in the RA and 96.4% in the BiV (His in LV port). Estimated battery longevity 12 months.     I have personally reviewed the patient's EKG today, which shows ASVP at 106bpm. QRS is 76. QTc is 435.    Relevant Cardiac Test Results:    DSE (10/14/2020):  The stress echo portion of this study is negative for myocardial ischemia.  The ECG portion of this study is negative for myocardial ischemia.  During stress, the following significant arrhythmias were observed:  occasional PVCs.  The patient's exercise capacity was above average.  The patient reached the end of the protocol.  With normal systolic function. The estimated ejection fraction is 60%.  Grade II diastolic dysfunction.  Normal right ventricular systolic function.  Mild left atrial enlargement.  The estimated PA systolic pressure is 40 mmHg.  Normal central venous pressure (3 mmHg).    Current Outpatient Medications   Medication Sig    ACCU-CHEK ALFRED PLUS TEST STRP Strp USE TO TEST THREE TIMES DAILY    albuterol sulfate (PROAIR RESPICLICK) 90 mcg/actuation AePB Inhale 2 puffs into the lungs every 4 (four) hours as needed. Rescue    amLODIPine (NORVASC) 5 MG tablet TAKE 1 TABLET BY MOUTH EVERY DAY (Patient taking differently: Take 5 mg by mouth 2 (two) times daily.)    atorvastatin (LIPITOR) 10 MG tablet Take 1 tablet (10 mg total) by mouth once daily.    buPROPion (WELLBUTRIN XL) 150 MG TB24 tablet Take 1 tablet (150 mg total) by mouth once daily.    CARBOplatin (PARAPLATIN) 10 mg/mL injection     cetirizine (ZYRTEC) 10 MG tablet Take 10 mg by mouth every evening.     CINVANTI 7.2 mg/mL Emul injection     dexAMETHasone (DECADRON) 4 mg/mL injection     diphenhydrAMINE (BENADRYL) 50 mg/mL injection     fluconazole (DIFLUCAN) 150 MG Tab Take 1 tablet (150 mg total) by mouth once daily. for 1 day    losartan (COZAAR) 100 MG tablet Take 1 tablet (100 mg total) by mouth once daily.    metFORMIN (GLUCOPHAGE) 500 MG tablet Take 2 tablets (1,000 mg total) by mouth 2 (two) times daily with meals.    metoprolol succinate (TOPROL-XL) 100 MG 24 hr tablet Take 1 tablet (100 mg total) by mouth once daily.    multivitamin (THERAGRAN) per tablet Take 1 tablet by mouth once daily.    mupirocin (BACTROBAN) 2 % ointment Apply topically 3 (three) times daily.    nitrofurantoin, macrocrystal-monohydrate, (MACROBID) 100 MG capsule Take 1 capsule (100 mg total) by mouth 2 (two) times daily. for 5 days    OLANZapine (ZYPREXA) 5 MG tablet  Take 1 tablet (5 mg total) by mouth every evening. Take 1 tablet by mouth every evening on days 1-4 of each chemotherapy cycle    ondansetron (ZOFRAN-ODT) 8 MG TbDL Take 1 tablet (8 mg total) by mouth every 8 (eight) hours as needed (nausea/vomiting). Take 1 tablet (8 mg) by mouth every 8 hours as needed for nausea/vomiting.    OPDIVO 120 mg/12 mL Soln     OPDIVO 240 mg/24 mL Soln     OPDIVO 40 mg/4 mL injection     palonosetron (ALOXI) 0.25 mg/5 mL injection     pantoprazole (PROTONIX) 40 MG tablet Take 1 tablet (40 mg total) by mouth once daily.    predniSONE (DELTASONE) 10 MG tablet Take 1 tablet (10 mg total) by mouth once daily. Take 4 tabs x 3 days, then take 2 tabs x 3 days, then take 1 tab x 3 days.    prochlorperazine (COMPAZINE) 10 MG tablet Take 1 tablet (10 mg total) by mouth every 6 (six) hours as needed (nausea/vomiting - second choice).    READI-CAT 2 2 % (w/v) suspension     semaglutide (OZEMPIC) 0.25 mg or 0.5 mg(2 mg/1.5 mL) pen injector Take 0.25 mg for 4 weeks, then increase to 0.5 mg weekly    sertraline (ZOLOFT) 100 MG tablet TAKE 1 TABLET BY MOUTH EVERY EVENING.    tiotropium (SPIRIVA WITH HANDIHALER) 18 mcg inhalation capsule INHALE 1 CAPSULE BY MOUTH EVERY DAY.    triamcinolone acetonide 0.1% (KENALOG) 0.1 % cream Apply topically 2 (two) times daily.    WIXELA INHUB 250-50 mcg/dose diskus inhaler INHALE 1 PUFF INTO THE LUNGS 2 (TWO) TIMES DAILY. CONTROLLER    YERVOY 50 mg/10 mL (5 mg/mL)      No current facility-administered medications for this visit.     Facility-Administered Medications Ordered in Other Visits   Medication    fentaNYL injection 25 mcg    haloperidol lactate injection 0.5 mg    HYDROmorphone injection 0.2 mg    ondansetron injection 4 mg    sodium chloride 0.9% flush 10 mL       Review of Systems   Constitutional: Negative for malaise/fatigue.   Cardiovascular:  Negative for chest pain, dyspnea on exertion, irregular heartbeat, leg swelling and palpitations.   Respiratory:  " Negative for shortness of breath.    Hematologic/Lymphatic: Negative for bleeding problem.   Skin:  Negative for rash.   Musculoskeletal:  Negative for myalgias.   Gastrointestinal:  Negative for hematemesis, hematochezia and nausea.   Genitourinary:  Negative for hematuria.   Neurological:  Negative for light-headedness.   Psychiatric/Behavioral:  Negative for altered mental status.    Allergic/Immunologic: Negative for persistent infections.     Objective:          /70   Pulse 106   Ht 5' 2" (1.575 m)   Wt 82.6 kg (182 lb 1.6 oz)   LMP  (LMP Unknown)   BMI 33.31 kg/m²     Physical Exam  Vitals and nursing note reviewed.   Constitutional:       Appearance: Normal appearance. She is well-developed.   HENT:      Head: Normocephalic.      Nose: Nose normal.   Eyes:      Pupils: Pupils are equal, round, and reactive to light.   Cardiovascular:      Rate and Rhythm: Regular rhythm. Tachycardia present.   Pulmonary:      Effort: No respiratory distress.      Breath sounds: Normal breath sounds.   Chest:      Comments: Device to LUCW. Incision and pocket in good repair.    Musculoskeletal:         General: Normal range of motion.   Skin:     General: Skin is warm and dry.      Findings: No erythema.   Neurological:      Mental Status: She is alert and oriented to person, place, and time.   Psychiatric:         Speech: Speech normal.         Behavior: Behavior normal.         Lab Results   Component Value Date     07/05/2023    K 4.3 07/05/2023    MG 1.1 (L) 07/05/2023    BUN 17 07/05/2023    CREATININE 1.2 07/05/2023    ALT 16 07/05/2023    AST 21 07/05/2023    HGB 11.5 (L) 07/05/2023    HCT 37.3 07/05/2023    TSH 2.812 07/03/2023    LDLCALC 30.6 (L) 05/25/2023       Recent Labs   Lab 11/09/20  1034 05/27/23  1430   INR 0.9 1.4 H       Assessment:     1. Cardiac resynchronization therapy defibrillator (CRT-D) in place    2. Left bundle-branch block    3. NICM (nonischemic cardiomyopathy)    4. Essential " hypertension        Plan:     In summary, Ms. Montoya is a 67 y.o. female with NICM, LBBB, CRT-D, HTN, HLD, DM, hx of rt breast CA, lung CA here for annual follow up.  Ms. Montoya is doing well from a device perspective with stable lead and device function. No arrhythmia noted. 96% biv pacing (His in LV port) with narrow QRS. Recent hospital visit for dehydration - pt tachycardic (120s-130s) in hospital which improved with fluids. She is feeling better now with no CHF symptoms. 12 months to LATOYA. Monthly device checks moving forward.    Continue current medication regimen and device settings.   Follow up in device clinic as scheduled. MONTHLY  Follow up in EP clinic in 1 year, sooner as needed.     *A copy of this note has been sent to Dr. Gates*    Follow up in about 1 year (around 7/10/2024).    ------------------------------------------------------------------    REGINALD Wade, NP-C  Cardiac Electrophysiology

## 2023-07-10 ENCOUNTER — OFFICE VISIT (OUTPATIENT)
Dept: ELECTROPHYSIOLOGY | Facility: CLINIC | Age: 68
End: 2023-07-10
Payer: MEDICARE

## 2023-07-10 ENCOUNTER — HOSPITAL ENCOUNTER (OUTPATIENT)
Dept: CARDIOLOGY | Facility: CLINIC | Age: 68
Discharge: HOME | End: 2023-07-10
Payer: MEDICARE

## 2023-07-10 VITALS
HEIGHT: 62 IN | WEIGHT: 182.13 LBS | SYSTOLIC BLOOD PRESSURE: 128 MMHG | HEART RATE: 106 BPM | DIASTOLIC BLOOD PRESSURE: 70 MMHG | BODY MASS INDEX: 33.51 KG/M2

## 2023-07-10 DIAGNOSIS — I10 ESSENTIAL HYPERTENSION: ICD-10-CM

## 2023-07-10 DIAGNOSIS — I42.8 NICM (NONISCHEMIC CARDIOMYOPATHY): ICD-10-CM

## 2023-07-10 DIAGNOSIS — I49.8 OTHER CARDIAC ARRHYTHMIA: ICD-10-CM

## 2023-07-10 DIAGNOSIS — Z95.810 CARDIAC RESYNCHRONIZATION THERAPY DEFIBRILLATOR (CRT-D) IN PLACE: Primary | ICD-10-CM

## 2023-07-10 DIAGNOSIS — I44.7 LEFT BUNDLE-BRANCH BLOCK: ICD-10-CM

## 2023-07-10 PROCEDURE — 99999 PR PBB SHADOW E&M-EST. PATIENT-LVL V: ICD-10-PCS | Mod: PBBFAC,HCNC,, | Performed by: NURSE PRACTITIONER

## 2023-07-10 PROCEDURE — 93010 ELECTROCARDIOGRAM REPORT: CPT | Mod: HCNC,S$GLB,, | Performed by: INTERNAL MEDICINE

## 2023-07-10 PROCEDURE — 3044F PR MOST RECENT HEMOGLOBIN A1C LEVEL <7.0%: ICD-10-PCS | Mod: CPTII,S$GLB,, | Performed by: NURSE PRACTITIONER

## 2023-07-10 PROCEDURE — 3074F PR MOST RECENT SYSTOLIC BLOOD PRESSURE < 130 MM HG: ICD-10-PCS | Mod: CPTII,S$GLB,, | Performed by: NURSE PRACTITIONER

## 2023-07-10 PROCEDURE — 1126F PR PAIN SEVERITY QUANTIFIED, NO PAIN PRESENT: ICD-10-PCS | Mod: CPTII,S$GLB,, | Performed by: NURSE PRACTITIONER

## 2023-07-10 PROCEDURE — 3008F PR BODY MASS INDEX (BMI) DOCUMENTED: ICD-10-PCS | Mod: CPTII,S$GLB,, | Performed by: NURSE PRACTITIONER

## 2023-07-10 PROCEDURE — 3044F HG A1C LEVEL LT 7.0%: CPT | Mod: CPTII,S$GLB,, | Performed by: NURSE PRACTITIONER

## 2023-07-10 PROCEDURE — 93005 ELECTROCARDIOGRAM TRACING: CPT | Mod: HCNC,S$GLB,, | Performed by: INTERNAL MEDICINE

## 2023-07-10 PROCEDURE — 99214 OFFICE O/P EST MOD 30 MIN: CPT | Mod: S$GLB,,, | Performed by: NURSE PRACTITIONER

## 2023-07-10 PROCEDURE — 1157F ADVNC CARE PLAN IN RCRD: CPT | Mod: CPTII,S$GLB,, | Performed by: NURSE PRACTITIONER

## 2023-07-10 PROCEDURE — 4010F ACE/ARB THERAPY RXD/TAKEN: CPT | Mod: CPTII,S$GLB,, | Performed by: NURSE PRACTITIONER

## 2023-07-10 PROCEDURE — 1159F MED LIST DOCD IN RCRD: CPT | Mod: CPTII,S$GLB,, | Performed by: NURSE PRACTITIONER

## 2023-07-10 PROCEDURE — 3078F PR MOST RECENT DIASTOLIC BLOOD PRESSURE < 80 MM HG: ICD-10-PCS | Mod: CPTII,S$GLB,, | Performed by: NURSE PRACTITIONER

## 2023-07-10 PROCEDURE — 99214 PR OFFICE/OUTPT VISIT, EST, LEVL IV, 30-39 MIN: ICD-10-PCS | Mod: S$GLB,,, | Performed by: NURSE PRACTITIONER

## 2023-07-10 PROCEDURE — 99999 PR PBB SHADOW E&M-EST. PATIENT-LVL V: CPT | Mod: PBBFAC,HCNC,, | Performed by: NURSE PRACTITIONER

## 2023-07-10 PROCEDURE — 1157F PR ADVANCE CARE PLAN OR EQUIV PRESENT IN MEDICAL RECORD: ICD-10-PCS | Mod: CPTII,S$GLB,, | Performed by: NURSE PRACTITIONER

## 2023-07-10 PROCEDURE — 93010 RHYTHM STRIP: ICD-10-PCS | Mod: HCNC,S$GLB,, | Performed by: INTERNAL MEDICINE

## 2023-07-10 PROCEDURE — 3008F BODY MASS INDEX DOCD: CPT | Mod: CPTII,S$GLB,, | Performed by: NURSE PRACTITIONER

## 2023-07-10 PROCEDURE — 3074F SYST BP LT 130 MM HG: CPT | Mod: CPTII,S$GLB,, | Performed by: NURSE PRACTITIONER

## 2023-07-10 PROCEDURE — 1160F RVW MEDS BY RX/DR IN RCRD: CPT | Mod: CPTII,S$GLB,, | Performed by: NURSE PRACTITIONER

## 2023-07-10 PROCEDURE — 1160F PR REVIEW ALL MEDS BY PRESCRIBER/CLIN PHARMACIST DOCUMENTED: ICD-10-PCS | Mod: CPTII,S$GLB,, | Performed by: NURSE PRACTITIONER

## 2023-07-10 PROCEDURE — 1159F PR MEDICATION LIST DOCUMENTED IN MEDICAL RECORD: ICD-10-PCS | Mod: CPTII,S$GLB,, | Performed by: NURSE PRACTITIONER

## 2023-07-10 PROCEDURE — 93005 RHYTHM STRIP: ICD-10-PCS | Mod: HCNC,S$GLB,, | Performed by: INTERNAL MEDICINE

## 2023-07-10 PROCEDURE — 4010F PR ACE/ARB THEARPY RXD/TAKEN: ICD-10-PCS | Mod: CPTII,S$GLB,, | Performed by: NURSE PRACTITIONER

## 2023-07-10 PROCEDURE — 3078F DIAST BP <80 MM HG: CPT | Mod: CPTII,S$GLB,, | Performed by: NURSE PRACTITIONER

## 2023-07-10 PROCEDURE — 1126F AMNT PAIN NOTED NONE PRSNT: CPT | Mod: CPTII,S$GLB,, | Performed by: NURSE PRACTITIONER

## 2023-07-11 ENCOUNTER — PATIENT OUTREACH (OUTPATIENT)
Dept: EMERGENCY MEDICINE | Facility: HOSPITAL | Age: 68
End: 2023-07-11
Payer: MEDICARE

## 2023-07-12 ENCOUNTER — OFFICE VISIT (OUTPATIENT)
Dept: FAMILY MEDICINE | Facility: CLINIC | Age: 68
End: 2023-07-12
Payer: MEDICARE

## 2023-07-12 VITALS
HEART RATE: 103 BPM | WEIGHT: 179.56 LBS | SYSTOLIC BLOOD PRESSURE: 120 MMHG | TEMPERATURE: 98 F | DIASTOLIC BLOOD PRESSURE: 80 MMHG | OXYGEN SATURATION: 95 % | BODY MASS INDEX: 32.84 KG/M2

## 2023-07-12 DIAGNOSIS — Z09 HOSPITAL DISCHARGE FOLLOW-UP: Primary | ICD-10-CM

## 2023-07-12 DIAGNOSIS — E11.9 TYPE 2 DIABETES MELLITUS WITHOUT COMPLICATION, WITHOUT LONG-TERM CURRENT USE OF INSULIN: ICD-10-CM

## 2023-07-12 DIAGNOSIS — I10 ESSENTIAL HYPERTENSION: ICD-10-CM

## 2023-07-12 DIAGNOSIS — E66.09 CLASS 1 OBESITY DUE TO EXCESS CALORIES WITH SERIOUS COMORBIDITY AND BODY MASS INDEX (BMI) OF 33.0 TO 33.9 IN ADULT: ICD-10-CM

## 2023-07-12 DIAGNOSIS — F33.1 MODERATE EPISODE OF RECURRENT MAJOR DEPRESSIVE DISORDER: ICD-10-CM

## 2023-07-12 PROCEDURE — 99214 PR OFFICE/OUTPT VISIT, EST, LEVL IV, 30-39 MIN: ICD-10-PCS | Mod: S$GLB,,, | Performed by: NURSE PRACTITIONER

## 2023-07-12 PROCEDURE — 99214 OFFICE O/P EST MOD 30 MIN: CPT | Mod: S$GLB,,, | Performed by: NURSE PRACTITIONER

## 2023-07-12 PROCEDURE — 99999 PR PBB SHADOW E&M-EST. PATIENT-LVL V: ICD-10-PCS | Mod: PBBFAC,,, | Performed by: NURSE PRACTITIONER

## 2023-07-12 PROCEDURE — 3074F PR MOST RECENT SYSTOLIC BLOOD PRESSURE < 130 MM HG: ICD-10-PCS | Mod: CPTII,S$GLB,, | Performed by: NURSE PRACTITIONER

## 2023-07-12 PROCEDURE — 4010F PR ACE/ARB THEARPY RXD/TAKEN: ICD-10-PCS | Mod: CPTII,S$GLB,, | Performed by: NURSE PRACTITIONER

## 2023-07-12 PROCEDURE — 1159F MED LIST DOCD IN RCRD: CPT | Mod: CPTII,S$GLB,, | Performed by: NURSE PRACTITIONER

## 2023-07-12 PROCEDURE — 3079F DIAST BP 80-89 MM HG: CPT | Mod: CPTII,S$GLB,, | Performed by: NURSE PRACTITIONER

## 2023-07-12 PROCEDURE — 1126F AMNT PAIN NOTED NONE PRSNT: CPT | Mod: CPTII,S$GLB,, | Performed by: NURSE PRACTITIONER

## 2023-07-12 PROCEDURE — 3074F SYST BP LT 130 MM HG: CPT | Mod: CPTII,S$GLB,, | Performed by: NURSE PRACTITIONER

## 2023-07-12 PROCEDURE — 3079F PR MOST RECENT DIASTOLIC BLOOD PRESSURE 80-89 MM HG: ICD-10-PCS | Mod: CPTII,S$GLB,, | Performed by: NURSE PRACTITIONER

## 2023-07-12 PROCEDURE — 3288F PR FALLS RISK ASSESSMENT DOCUMENTED: ICD-10-PCS | Mod: CPTII,S$GLB,, | Performed by: NURSE PRACTITIONER

## 2023-07-12 PROCEDURE — 4010F ACE/ARB THERAPY RXD/TAKEN: CPT | Mod: CPTII,S$GLB,, | Performed by: NURSE PRACTITIONER

## 2023-07-12 PROCEDURE — 3008F PR BODY MASS INDEX (BMI) DOCUMENTED: ICD-10-PCS | Mod: CPTII,S$GLB,, | Performed by: NURSE PRACTITIONER

## 2023-07-12 PROCEDURE — 1157F ADVNC CARE PLAN IN RCRD: CPT | Mod: CPTII,S$GLB,, | Performed by: NURSE PRACTITIONER

## 2023-07-12 PROCEDURE — 3044F HG A1C LEVEL LT 7.0%: CPT | Mod: CPTII,S$GLB,, | Performed by: NURSE PRACTITIONER

## 2023-07-12 PROCEDURE — 3288F FALL RISK ASSESSMENT DOCD: CPT | Mod: CPTII,S$GLB,, | Performed by: NURSE PRACTITIONER

## 2023-07-12 PROCEDURE — 1126F PR PAIN SEVERITY QUANTIFIED, NO PAIN PRESENT: ICD-10-PCS | Mod: CPTII,S$GLB,, | Performed by: NURSE PRACTITIONER

## 2023-07-12 PROCEDURE — 1101F PT FALLS ASSESS-DOCD LE1/YR: CPT | Mod: CPTII,S$GLB,, | Performed by: NURSE PRACTITIONER

## 2023-07-12 PROCEDURE — 99999 PR PBB SHADOW E&M-EST. PATIENT-LVL V: CPT | Mod: PBBFAC,,, | Performed by: NURSE PRACTITIONER

## 2023-07-12 PROCEDURE — 1101F PR PT FALLS ASSESS DOC 0-1 FALLS W/OUT INJ PAST YR: ICD-10-PCS | Mod: CPTII,S$GLB,, | Performed by: NURSE PRACTITIONER

## 2023-07-12 PROCEDURE — 1157F PR ADVANCE CARE PLAN OR EQUIV PRESENT IN MEDICAL RECORD: ICD-10-PCS | Mod: CPTII,S$GLB,, | Performed by: NURSE PRACTITIONER

## 2023-07-12 PROCEDURE — 1159F PR MEDICATION LIST DOCUMENTED IN MEDICAL RECORD: ICD-10-PCS | Mod: CPTII,S$GLB,, | Performed by: NURSE PRACTITIONER

## 2023-07-12 PROCEDURE — 3044F PR MOST RECENT HEMOGLOBIN A1C LEVEL <7.0%: ICD-10-PCS | Mod: CPTII,S$GLB,, | Performed by: NURSE PRACTITIONER

## 2023-07-12 PROCEDURE — 3008F BODY MASS INDEX DOCD: CPT | Mod: CPTII,S$GLB,, | Performed by: NURSE PRACTITIONER

## 2023-07-12 NOTE — PROGRESS NOTES
HPI     Chief Complaint:  ED follow up      Hannah Montoya is a 67 y.o. female with multiple medical diagnoses as listed in the medical history and problem list that presents for ED follow up.  Pt is new to me but is known to this clinic with her last appointment being 6/1/2023.        Recent hospital encounter. See below encounter note from 7/5/2023.    History           Chief Complaint   Patient presents with    Nausea       Arrived via ems from store with c/o nausea and generalized weakness onset today      67-year-old female with past medical history of NSCLC on immunotherapy, HTN, HLD, DM-2, COPD presenting to ED after episode of weakness and nausea.  Patient states that she was at the store and had been walking around for a few minutes when she suddenly felt generalized weakness associated with nausea.  She went home and took a Zofran which improved her nausea symptoms.  She states that she was drinking ice tea all day yesterday which is unusual for her.  She did not drink any water and reports decreased urination.  She had her AICD interrogated earlier this week.  She denies fevers, chills, diarrhea, chest pain cough or shortness of breath.    ED Management:  Patient is tachycardic to the 120s to 130s.  She is normotensive. Her lungs are clear to auscultation bilaterally. She has no complaints of chest pain or shortness of breath.  She is satting 90-94%.  CBC, CMP, troponin, BNP, and CPK are within normal limits.  Labs show hypomagnesemia and hypophosphatemia which will be repleted in the ED. Age-adjusted D-dimer is within normal limits, lowering suspicion for PE.  Patient given 1 L IV fluid bolus with significant improvement of her weakness and heart rate.  UA demonstrates multiple hyaline casts consistent with dehydration.  There are also 15 RBCs, 86 WBCs, moderate bacteria, occasional yeast and 25 squamous epithelial cells.  This is suggestive of a contaminated sample.  Given that patient has no  symptoms of UTI, history of recurrent UTI or symptoms of yeast infection, she will be given a prepack prescription for fluconazole and Macrobid to be filled if she develops symptoms.  She also states that she is scheduled for routine labs including a UA with her PCP.  She was offered the option to have a repeat urinalysis done in the ED but patient declined present and stated she will follow up with her primary doctor.  Patient discharged home with strict return precautions.  She was advised follow up with her PCP.  Patient verbalized understanding and agreement with plan.  She has a follow up appointment scheduled next week with Cardiology as well.  She was counseled to stay well-hydrated.  All questions answered.       fluconazole (DIFLUCAN) 150 MG Tab Take 1 tablet (150 mg total) by mouth once daily. for 1 day 1 tablet 7/5/2023 7/6/2023 Anne Marie Young MD     nitrofurantoin, macrocrystal-monohydrate, (MACROBID) 100 MG capsule Take 1 capsule (100 mg total) by mouth 2 (two) times daily. for 5 days 10 capsule 7/5/2023 7/10/2023 Anne Marie Young MD             Assessment & Plan     Problem List Items Addressed This Visit          Psychiatric    Moderate episode of recurrent major depressive disorder    Reports mood is stable. Denies thoughts of self harm.         Cardiac/Vascular    Essential hypertension    BP Readings from Last 3 Encounters:   07/12/23 120/80   07/10/23 128/70   07/05/23 132/81       -continue current medication regimen  -DASH diet, regular cardiovascular exercises, portion control  - ?weight loss  -f/u with BP logs in 2 weeks     Enrolled in digital medicine program for this diagnosis      Relevant Orders    Hypertension Digital Medicine (HDMP) Enrollment Order (Completed)       Endocrine    Type 2 diabetes mellitus without complication    -discussed with patient about routine diabetic care that includes but are not limited to regular eye exams, skin care, daily foot exam, proper nutrition, regular  BG monitoring at home (fasting and mealtime) and medication compliance in a diabetic.  Target morning BS  and meal-time BS <180      Relevant Orders    Ambulatory referral/consult to Podiatry    Class 1 obesity due to excess calories with serious comorbidity and body mass index (BMI) of 33.0 to 33.9 in adult    We discussed weight issues and safe, effective ways of losing pounds, includin) diet:  low carbohydrate, low fat diet, stay away from fast food, fried and processed food, use whole grain, lot of fruits and vegetables, use healthy fat such as avocado, nuts and olive oil in reasonable quantity, stay away from sodas. Regular meals with lean proteins.  2) physical activity: ideally 150 min a week, with cardiovascular and resistance activity.  Patient was encouraged to set realistic attainable goals for weight loss, and we will follow up periodically.    Discussed Mediterranean Diet recommendations (Adopted from Hunter et al, [facility], 2018.)  - Eat primarily plant-based foods, such as fruits and vegetables, whole grains, legumes (beans) and nuts  - Limit refined carbohydrates (white pasta, bread, rice).  - Replace butter with healthy fats such as olive oil.  - Use herbs and spices instead of salt to flavor foods.  - Limit red meat and processed meats to no more than a few times a month.  - Avoid sugary sodas, bakery goods, and sweets.  - Eat fish and poultry at least twice a week.       Other Visit Diagnoses       Hospital discharge follow-up    -  Primary    Hospital encounter notes, objective/subjective data, diagnostics, and plan of care from  reviewed.    Pt reports since d/c she has felt better. Denies nausea or weakness. She has decreased intake of caffeine and increased intake of water. She has not taken macrobid or diflucan.     Discussed risks of dehydration.     Discussed condition, and treatment.   Education sent to patient portal/included in after visit summary.  ED precautions given.    Notify provider if symptoms do not resolve or increase in severity.   Patient verbalizes understanding and agrees with plan of care.          --------------------------------------------      Health Maintenance:  Health Maintenance         Date Due Completion Date    TETANUS VACCINE Never done ---    Diabetes Urine Screening 02/03/2021 2/3/2020    Foot Exam 08/03/2021 8/3/2020 (Done)    Override on 8/3/2020: Done    Override on 1/21/2019: Done    Override on 12/28/2017: Done    COVID-19 Vaccine (4 - Booster for Pfizer series) 11/11/2021 9/16/2021    Eye Exam 09/30/2022 9/30/2021    Override on 2/8/2016: Done    HIV Screening 02/03/2026 (Originally 8/30/1970) ---    Influenza Vaccine (1) 09/01/2023 11/1/2022    Override on 9/18/2017: Done (done at work)    Override on 10/3/2016: Done    Override on 10/12/2015: Done    Mammogram 09/28/2023 9/28/2022    Override on 6/2/2015: Done    Hemoglobin A1c 11/25/2023 5/25/2023    Override on 6/3/2015: Done    Lipid Panel 05/25/2024 5/25/2023    Override on 6/2/2015: Done    Low Dose Statin 07/10/2024 7/10/2023            Diabetic foot screening ordered and Advised patient on the importance of completing overdue health maintenance items    Follow Up:  Follow up in about 3 months (around 10/12/2023), or if symptoms worsen or fail to improve.    Exam     Review of Systems:  (as noted above)  Review of Systems   Constitutional:  Negative for fever.   HENT:  Negative for trouble swallowing.    Eyes:  Negative for visual disturbance.   Respiratory:  Negative for chest tightness and shortness of breath.    Cardiovascular:  Negative for chest pain.   Gastrointestinal:  Negative for blood in stool.     Physical Exam:   Physical Exam  Constitutional:       General: She is not in acute distress.     Appearance: She is obese. She is not ill-appearing or diaphoretic.   HENT:      Head: Normocephalic and atraumatic.   Cardiovascular:      Rate and Rhythm: Normal rate and regular rhythm.       Heart sounds: No murmur heard.    No friction rub. No gallop.   Pulmonary:      Effort: No respiratory distress.   Chest:      Chest wall: No tenderness.   Musculoskeletal:      Cervical back: No rigidity.   Skin:     Capillary Refill: Capillary refill takes 2 to 3 seconds.   Neurological:      General: No focal deficit present.      Mental Status: She is alert and oriented to person, place, and time.     Vitals:    23 1038   BP: 120/80   BP Location: Left arm   Patient Position: Sitting   BP Method: Medium (Manual)   Pulse: 103   Temp: 97.9 °F (36.6 °C)   TempSrc: Oral   SpO2: 95%   Weight: 81.4 kg (179 lb 9 oz)      Body mass index is 32.84 kg/m².        History     Past Medical History:  Past Medical History:   Diagnosis Date    AICD (automatic cardioverter/defibrillator) present     Asthma     bronchitis in past    Breast cancer 2016    right    Cardiac pacemaker     Cardiomyopathy     COPD (chronic obstructive pulmonary disease)     Diabetes mellitus, type 2     Hyperglycemia     Hyperlipidemia     Hypertension     Malignant neoplasm of overlapping sites of female breast 2016    Nuclear sclerosis of both eyes 2020    Respiratory distress 3/12/2020       Past Surgical History:  Past Surgical History:   Procedure Laterality Date    BIOPSY, WITH CT GUIDANCE Right 2023    Procedure: LUNG MASS BIOPSY, WITH CT GUIDANCE;  Surgeon: Yehuda Green MD;  Location: [facility], [place], operated by [facility] CATH LAB;  Service: Radiology;  Laterality: Right;    BREAST BIOPSY Right 2016    IDC    BREAST LUMPECTOMY Right     CARDIAC CATHETERIZATION Bilateral 2017    CARDIAC DEFIBRILLATOR PLACEMENT Left 08/10/2017    CARDIAC DEFIBRILLATOR PLACEMENT Left 2018     SECTION      x2    CHOLECYSTECTOMY      INSERTION OF TUNNELED CENTRAL VENOUS CATHETER (CVC) WITH SUBCUTANEOUS PORT Right 2020    Procedure: [id number]-Q-CATH, RIGHT;  Surgeon: Josefa Caceres MD;  Location: [facility] OR [address] [place] [address];  Service: General;   Laterality: Right;  Port-a-cath placed to R. IJ    LUNG BIOPSY N/A 2020    Procedure: BIOPSY, LUNG;  Surgeon: Dosfernando Diagnostic Provider;  Location: [facility] OR;  Service: Radiology;  Laterality: N/A;  8AM START  RN PREOP 2020---COVID NEGATIVE    NAVIGATIONAL BRONCHOSCOPY N/A 10/13/2020    Procedure: BRONCHOSCOPY, NAVIGATIONAL;  Surgeon: Jamilah Weaver MD;  [facility];  Service: Pulmonary;  Laterality: N/A;    REVISION OF IMPLANTABLE CARDIOVERTER-DEFIBRILLATOR (ICD) ELECTRODE LEAD PLACEMENT N/A 6/15/2018    Procedure: REVISION-LEAD-ICD;  Surgeon: Javy Gates MD;  Location: [facility] CATH LAB;  Service: Cardiology;  Laterality: N/A;  LBBB, Bi-V ICD HIS Ld rev, MDT, Choice, MB, 3 Prep    ROBOT-ASSISTED LAPAROSCOPIC LYMPHADENECTOMY USING DA SALVADOR XI Right 10/23/2020    Procedure: XI ROBOTIC LYMPHADENECTOMY;  Surgeon: Ramo Tucker MD;  [facility];  Service: Thoracic;  Laterality: Right;    TUBAL LIGATION         Social History:  Social History     Socioeconomic History    Marital status:     Number of children: 2   Occupational History     Employer: [employer]   Tobacco Use    Smoking status: Former     Packs/day: 0.50     Years: 40.00     Pack years: 20.00     Types: Cigarettes     Quit date: 2016     Years since quittin.9     Passive exposure: Past    Smokeless tobacco: Never   Substance and Sexual Activity    Alcohol use: Yes     Alcohol/week: 1.0 standard drink     Types: 1 Glasses of wine per week     Comment: rare- holiday    Drug use: No    Sexual activity: Not Currently     Partners: Male     Comment:        Family History:  Family History   Problem Relation Age of Onset    Kidney disease Mother     Cataracts Mother     Kidney disease Father     Cataracts Father     Clotting disorder Brother     No Known Problems Daughter     No Known Problems Son     No Known Problems Sister     No Known Problems Maternal Aunt     No Known Problems Maternal  Uncle     No Known Problems Paternal Aunt     No Known Problems Paternal Uncle     Macular degeneration Maternal Grandmother     No Known Problems Maternal Grandfather     No Known Problems Paternal Grandmother     No Known Problems Paternal Grandfather     Breast cancer Neg Hx     Ovarian cancer Neg Hx     Amblyopia Neg Hx     Blindness Neg Hx     Cancer Neg Hx     Diabetes Neg Hx     Glaucoma Neg Hx     Hypertension Neg Hx     Retinal detachment Neg Hx     Strabismus Neg Hx     Stroke Neg Hx     Thyroid disease Neg Hx        Allergies and Medications: (updated and reviewed)  Review of patient's allergies indicates:   Allergen Reactions    Taxol [paclitaxel]      Hypersensitivity reaction to taxol, symptoms included shortness of breath, nausea, dizziness, flushing     Carboplatin Other (See Comments)     Itching and hives    Adhesive Rash     tegaderm burns and blistered skin     Current Outpatient Medications   Medication Sig Dispense Refill    ACCU-CHEK ALFRED PLUS TEST STRP Strp USE TO TEST THREE TIMES DAILY 200 strip 3    albuterol sulfate (PROAIR RESPICLICK) 90 mcg/actuation AePB Inhale 2 puffs into the lungs every 4 (four) hours as needed. Rescue 1 each 5    amLODIPine (NORVASC) 5 MG tablet TAKE 1 TABLET BY MOUTH EVERY DAY (Patient taking differently: Take 5 mg by mouth 2 (two) times daily.) 90 tablet 3    atorvastatin (LIPITOR) 10 MG tablet Take 1 tablet (10 mg total) by mouth once daily. (Patient taking differently: Take 10 mg by mouth once daily. Patient taking EOD) 90 tablet 3    buPROPion (WELLBUTRIN XL) 150 MG TB24 tablet Take 1 tablet (150 mg total) by mouth once daily. 90 tablet 0    cetirizine (ZYRTEC) 10 MG tablet Take 10 mg by mouth every evening.       losartan (COZAAR) 100 MG tablet Take 1 tablet (100 mg total) by mouth once daily. 90 tablet 2    metFORMIN (GLUCOPHAGE) 500 MG tablet Take 2 tablets (1,000 mg total) by mouth 2 (two) times daily with meals. 360 tablet 3    metoprolol succinate  (TOPROL-XL) 100 MG 24 hr tablet Take 1 tablet (100 mg total) by mouth once daily. 90 tablet 3    multivitamin (THERAGRAN) per tablet Take 1 tablet by mouth once daily.      mupirocin (BACTROBAN) 2 % ointment Apply topically 3 (three) times daily. 30 g 1    ondansetron (ZOFRAN-ODT) 8 MG TbDL Take 1 tablet (8 mg total) by mouth every 8 (eight) hours as needed (nausea/vomiting). Take 1 tablet (8 mg) by mouth every 8 hours as needed for nausea/vomiting. 60 tablet 5    pantoprazole (PROTONIX) 40 MG tablet Take 1 tablet (40 mg total) by mouth once daily. 90 tablet 3    predniSONE (DELTASONE) 10 MG tablet Take 1 tablet (10 mg total) by mouth once daily. Take 4 tabs x 3 days, then take 2 tabs x 3 days, then take 1 tab x 3 days. 21 tablet 0    prochlorperazine (COMPAZINE) 10 MG tablet Take 1 tablet (10 mg total) by mouth every 6 (six) hours as needed (nausea/vomiting - second choice). 30 tablet 1    READI-CAT 2 2 % (w/v) suspension       semaglutide (OZEMPIC) 0.25 mg or 0.5 mg(2 mg/1.5 mL) pen injector Inject 0.5 mg into the skin every 7 days. 3 each 3    sertraline (ZOLOFT) 100 MG tablet TAKE 1 TABLET BY MOUTH EVERY EVENING. 90 tablet 3    tiotropium (SPIRIVA WITH HANDIHALER) 18 mcg inhalation capsule INHALE 1 CAPSULE BY MOUTH EVERY DAY. 90 capsule 3    triamcinolone acetonide 0.1% (KENALOG) 0.1 % cream Apply topically 2 (two) times daily. 453.6 g 2    WIXELA INHUB 250-50 mcg/dose diskus inhaler INHALE 1 PUFF INTO THE LUNGS 2 (TWO) TIMES DAILY. CONTROLLER 180 each 3    CARBOplatin (PARAPLATIN) 10 mg/mL injection       CINVANTI 7.2 mg/mL Emul injection       dexAMETHasone (DECADRON) 4 mg/mL injection       diphenhydrAMINE (BENADRYL) 50 mg/mL injection       OLANZapine (ZYPREXA) 5 MG tablet Take 1 tablet (5 mg total) by mouth every evening. Take 1 tablet by mouth every evening on days 1-4 of each chemotherapy cycle (Patient not taking: Reported on 7/12/2023) 30 tablet 1    OPDIVO 120 mg/12 mL Soln       OPDIVO 240 mg/24 mL  Soln       OPDIVO 40 mg/4 mL injection       palonosetron (ALOXI) 0.25 mg/5 mL injection       YERVOY 50 mg/10 mL (5 mg/mL)        No current facility-administered medications for this visit.     Facility-Administered Medications Ordered in Other Visits   Medication Dose Route Frequency Provider Last Rate Last Admin    fentaNYL injection 25 mcg  25 mcg Intravenous Q5 Min PRN Keesha Martins MD        haloperidol lactate injection 0.5 mg  0.5 mg Intravenous Once PRN Keesha Martins MD        HYDROmorphone injection 0.2 mg  0.2 mg Intravenous Q5 Min PRN Keesha Martins MD        ondansetron injection 4 mg  4 mg Intravenous Once PRN Keesha Martins MD        sodium chloride 0.9% flush 10 mL  10 mL Intravenous PRN Keesha Martins MD           Patient Care Team:  Emanuel Chavez MD as PCP - General (Family Medicine)  Julianne Juarez OD as Consulting Physician (Optometry)  Celine Penn RN as Research Coordinator  Caridad Menard LPN as Care Coordinator  Javi Avina MD as Oncologist (Hematology and Oncology)  Shay FengD as Diabetes Digital Medicine Clinician (Pharmacist)  Emanuel Chavez MD as Diabetes Digital Medicine Responsible Provider (Family Medicine)  [employer] as Diabetes Digital Medicine Contract  Danitza Weir as Digital Medicine Health   Silvia Riley as ED Navigator         - The patient is given an After Visit Summary that lists all medications with directions, allergies, education, orders placed during this encounter and follow-up instructions.      - I have reviewed the patient's medical information including past medical, family, and social history sections including the medications and allergies.      - We discussed the patient's current medications.     This note was created by combination of typed  and MModal dictation.  Transcription errors may be present.  If there are any questions, please contact me.       Eze Lara NP

## 2023-07-12 NOTE — PATIENT INSTRUCTIONS
"Medical Fitness--[phone]  Imaging, Xray, CT, MRI, Ultrasound---[phone]  Bariatrics---[phone]  Breast Surgery---[phone]  Case Management---[phone]  Colonoscopy---[phone]  DME---[phone]  Infectious Disease---[phone]  Interventional Radiology---[phone]  Medical Records---[phone]  [facility]---[phone]  Optometry/Ophthalmology---[phone]  [facility]---[phone]  Physical Therapy---[phone]  Psychiatry---[phone] or [phone]  Plastic Surgery---[phone]  Recovery--[phone] option 2, or [phone].  Sleep Study---[phone]  Smoking Cessation---[phone]  Wound Care---[phone]  Referral Desk---[phone]    Patient Education       Weight Loss Diet   About this topic   There are many "trendy" weight loss diets that are popular today. Many of these diets can end up being more harmful than helpful. The healthiest way to lose weight is to burn more calories than you eat.  A weight loss diet should help you have a healthy view of eating. It is NOT healthy to stop eating to try and lose weight. A good diet plan will help you cut down your food intake and make healthy choices.  A healthy weight loss goal is 1 to 2 pounds (0.5 to 1 kg) per week. Reducing calories in your diet, burning calories through exercise, or both can help you lose weight. Combining a healthy diet with regular physical activity can help you get the best results.  To cut calories in your diet you can:  Switch from whole milk to 1% or skim milk.  Switch from regular cheese to low-fat or fat-free cheese.  Use healthier condiment choices:  Fat-free or low-fat sour cream or salad dressings  Spray butter  Diet syrups or jellies over regular  Try frozen yogurt as a dessert rather than eating ice cream.  Skip the chips. Snack on carrots, vegetables, or fruit. If chips are a favorite of yours, try the baked style and watch portion size.  Eat grilled, roasted, boiled, broiled, " or baked meats. Avoid deep-frying. Choose skinless poultry, lean red meat, lean cuts of pork, and fish for good protein sources.  Try flavored no-calorie quiles. Do not drink soda and juices that have many calories.  Choose fruit instead of sweets.  General   Eating smaller meals more often may be helpful. This will keep you from overeating at your next meal. Also, eating meals slowly helps you feel full faster.  If eating 3 meals is a part of your lifestyle, choose more lean proteins and higher fiber foods to fill you up at each meal.  Do not skip meals. Most often if you skip a meal, you eat too much at the next meal.  Eat smaller portions. Use a smaller plate or bowl for meals, and when you are eating out, eat half and take the rest home.  Plan ahead. Plan your meals and grocery list before going to the store. Planning will keep you from getting meals from restaurants.  Do not go to the grocery store hungry. You are more likely to buy snacks that are not good for you.  Portion out snacks. When you are having a snack, instead of grabbing the whole bag, portion a small amount out to give yourself a stopping point.  Drink water before and after your meals to help fill you up without the calories.  When eating starchy foods, choose whole-grain products. These have a lot of fiber which will make you feel full. Fiber also helps lower cholesterol and helps with bowel function.  If you need a helpful start, ask your doctor to send you to a dietitian for weight loss help.         What will the results be?   Losing excess weight will make your whole body healthier. You will have more energy for your daily activities and lower your risk for health problems.  What lifestyle changes are needed?   Stay active. Eating healthy is not always enough to lose weight. Burning calories by exercising is a big part of weight loss.  What foods are good to eat?   The key is to watch your portion sizes. It is best to choose foods that are  lower in fat and calories.  Choose lean meats:  Boneless, skinless chicken breast  Pork loin  90% lean beef  Lean turkey meat  Fresh fish (not fried)  Choose low-fat dairy products:  1% or skim milk  Spray butter or margarine  Low-fat or fat-free cheese  Frozen yogurt or low-calorie ice cream  Choose fresh fruits, vegetables, beans and lentils, and whole wheat products more often.  Choose water to drink more often. Drink diet or no-calorie beverages when you want something other than water. Aim to get your calories from the foods you eat.  Choose smart snacks:  Fruits  Vegetables  Low-fat or nonfat yogurt  Low-fat or no-fat cheese, such as cottage cheese  Unsalted nuts  Hard-boiled egg  Hummus  Guacamole  Natural peanut butter  Popcorn with no butter ? use pepper, garlic, or another spice to taste  Whole grain crackers  What foods should be limited or avoided?   Limit high-fat, high-sodium, and high-calorie foods like:  Fried foods  Processed meats  Whole-fat dairy products  Candy, cookies, chips, pastries  Sausage, rojas, any full-fat meats  Soda, juice  Beer, wine, and mixed drinks (alcohol)  Will there be any other care needed?   What do I do first before trying to lose weight?  Talk to your doctor and dietitian to see if you need to lose weight. Work with them to set your weight loss goals.  If you have a chronic illness, such as high blood sugar or high blood pressure, ask a doctor or dietitian what diet and exercise is right for you.  Ask your doctor about how much you are able to exercise and what type of exercise is good for you.  Helpful tips   Keep a food journal to help keep you on track.  Join a support group.  Tips for burning calories:  If your workplace is near your house, choose to walk or bike to work instead of driving.  Take 20-minute walks each day. Walk around during your lunch break. You will not only burn calories, but will raise your energy for the rest of the day.  Take the stairs over the  elevator.  Join a gym or exercise class with a friend.  Try to exercise 30 minutes a day for overall health. Three 10-minute sessions work too. Aim for 60 to 90 minutes a day to lose weight.  Drink lots of water before, during, and after exercise.  Where can I learn more?   [employer]y [employer] Dietetics  https://www.eatright.org/health/weight-loss/your-health-and-your-weight/back-to-basics-for-healthy-weight-loss   Centers for Disease Control and Prevention  https://www.cdc.gov/healthyweight/healthy_eating/index.html   Familydoctor.org  https://familydoctor.org/nutrition-weight-loss-need-know-fad-diets/   [facility]HS  https://www.nhs.uk/live-well/healthy-weight/12-tips-to-help-you-lose-weight/?tabname=you-and-your-weight   Last Reviewed Date   2021-06-24  Consumer Information Use and Disclaimer   This information is not specific medical advice and does not replace information you receive from your health care provider. This is only a brief summary of general information. It does NOT include all information about conditions, illnesses, injuries, tests, procedures, treatments, therapies, discharge instructions or life-style choices that may apply to you. You must talk with your health care provider for complete information about your health and treatment options. This information should not be used to decide whether or not to accept your health care providers advice, instructions or recommendations. Only your health care provider has the knowledge and training to provide advice that is right for you.  Copyright   Copyright © 2021 [employer] and its affiliates and/or licensors. All rights reserved.

## 2023-07-18 ENCOUNTER — TELEPHONE (OUTPATIENT)
Dept: FAMILY MEDICINE | Facility: CLINIC | Age: 68
End: 2023-07-18

## 2023-07-21 ENCOUNTER — PES CALL (OUTPATIENT)
Dept: ADMINISTRATIVE | Facility: CLINIC | Age: 68
End: 2023-07-21
Payer: MEDICARE

## 2023-07-31 ENCOUNTER — HOSPITAL ENCOUNTER (OUTPATIENT)
Dept: RADIOLOGY | Facility: HOSPITAL | Age: 68
Discharge: HOME | End: 2023-07-31
Attending: INTERNAL MEDICINE
Payer: MEDICARE

## 2023-07-31 DIAGNOSIS — C34.91 NSCLC OF RIGHT LUNG: ICD-10-CM

## 2023-07-31 PROCEDURE — 74177 CT CHEST ABDOMEN PELVIS WITH CONTRAST (XPD): ICD-10-PCS | Mod: 26,,, | Performed by: RADIOLOGY

## 2023-07-31 PROCEDURE — 74177 CT ABD & PELVIS W/CONTRAST: CPT | Mod: 26,,, | Performed by: RADIOLOGY

## 2023-07-31 PROCEDURE — 25500020 PHARM REV CODE 255: Performed by: INTERNAL MEDICINE

## 2023-07-31 PROCEDURE — 74177 CT ABD & PELVIS W/CONTRAST: CPT | Mod: TC

## 2023-07-31 PROCEDURE — 71260 CT THORAX DX C+: CPT | Mod: 26,,, | Performed by: RADIOLOGY

## 2023-07-31 PROCEDURE — 71260 CT CHEST ABDOMEN PELVIS WITH CONTRAST (XPD): ICD-10-PCS | Mod: 26,,, | Performed by: RADIOLOGY

## 2023-07-31 PROCEDURE — 71260 CT THORAX DX C+: CPT | Mod: TC

## 2023-07-31 RX ADMIN — IOHEXOL 75 ML: 350 INJECTION, SOLUTION INTRAVENOUS at 09:07

## 2023-08-01 ENCOUNTER — INFUSION (OUTPATIENT)
Dept: INFUSION THERAPY | Facility: HOSPITAL | Age: 68
End: 2023-08-01
Payer: MEDICARE

## 2023-08-01 ENCOUNTER — OFFICE VISIT (OUTPATIENT)
Dept: HEMATOLOGY/ONCOLOGY | Facility: CLINIC | Age: 68
End: 2023-08-01
Payer: MEDICARE

## 2023-08-01 VITALS
DIASTOLIC BLOOD PRESSURE: 60 MMHG | HEIGHT: 62 IN | BODY MASS INDEX: 32.86 KG/M2 | RESPIRATION RATE: 18 BRPM | TEMPERATURE: 98 F | WEIGHT: 178.56 LBS | SYSTOLIC BLOOD PRESSURE: 132 MMHG | HEART RATE: 82 BPM

## 2023-08-01 VITALS
RESPIRATION RATE: 18 BRPM | TEMPERATURE: 98 F | SYSTOLIC BLOOD PRESSURE: 123 MMHG | HEART RATE: 108 BPM | OXYGEN SATURATION: 94 % | HEIGHT: 62 IN | WEIGHT: 178.56 LBS | DIASTOLIC BLOOD PRESSURE: 77 MMHG | BODY MASS INDEX: 32.86 KG/M2

## 2023-08-01 DIAGNOSIS — Z85.3 HISTORY OF BREAST CANCER IN FEMALE: ICD-10-CM

## 2023-08-01 DIAGNOSIS — C34.91 NSCLC OF RIGHT LUNG: Primary | ICD-10-CM

## 2023-08-01 DIAGNOSIS — I42.0 DILATED CARDIOMYOPATHY: ICD-10-CM

## 2023-08-01 DIAGNOSIS — Z95.810 CARDIAC RESYNCHRONIZATION THERAPY DEFIBRILLATOR (CRT-D) IN PLACE: ICD-10-CM

## 2023-08-01 DIAGNOSIS — I10 ESSENTIAL HYPERTENSION: ICD-10-CM

## 2023-08-01 DIAGNOSIS — J70.0 RADIATION PNEUMONITIS: ICD-10-CM

## 2023-08-01 DIAGNOSIS — R91.8 LUNG MASS: ICD-10-CM

## 2023-08-01 DIAGNOSIS — I44.7 LEFT BUNDLE-BRANCH BLOCK: ICD-10-CM

## 2023-08-01 PROCEDURE — 3008F BODY MASS INDEX DOCD: CPT | Mod: HCNC,CPTII,S$GLB, | Performed by: INTERNAL MEDICINE

## 2023-08-01 PROCEDURE — 3078F PR MOST RECENT DIASTOLIC BLOOD PRESSURE < 80 MM HG: ICD-10-PCS | Mod: HCNC,CPTII,S$GLB, | Performed by: INTERNAL MEDICINE

## 2023-08-01 PROCEDURE — 1157F PR ADVANCE CARE PLAN OR EQUIV PRESENT IN MEDICAL RECORD: ICD-10-PCS | Mod: HCNC,CPTII,S$GLB, | Performed by: INTERNAL MEDICINE

## 2023-08-01 PROCEDURE — 1101F PT FALLS ASSESS-DOCD LE1/YR: CPT | Mod: HCNC,CPTII,S$GLB, | Performed by: INTERNAL MEDICINE

## 2023-08-01 PROCEDURE — 99999 PR PBB SHADOW E&M-EST. PATIENT-LVL V: ICD-10-PCS | Mod: PBBFAC,HCNC,, | Performed by: INTERNAL MEDICINE

## 2023-08-01 PROCEDURE — A4216 STERILE WATER/SALINE, 10 ML: HCPCS | Mod: HCNC | Performed by: INTERNAL MEDICINE

## 2023-08-01 PROCEDURE — 1126F PR PAIN SEVERITY QUANTIFIED, NO PAIN PRESENT: ICD-10-PCS | Mod: HCNC,CPTII,S$GLB, | Performed by: INTERNAL MEDICINE

## 2023-08-01 PROCEDURE — 99215 PR OFFICE/OUTPT VISIT, EST, LEVL V, 40-54 MIN: ICD-10-PCS | Mod: HCNC,S$GLB,, | Performed by: INTERNAL MEDICINE

## 2023-08-01 PROCEDURE — 96417 CHEMO IV INFUS EACH ADDL SEQ: CPT | Mod: HCNC

## 2023-08-01 PROCEDURE — 25000003 PHARM REV CODE 250: Mod: HCNC | Performed by: INTERNAL MEDICINE

## 2023-08-01 PROCEDURE — 1159F MED LIST DOCD IN RCRD: CPT | Mod: HCNC,CPTII,S$GLB, | Performed by: INTERNAL MEDICINE

## 2023-08-01 PROCEDURE — 3074F PR MOST RECENT SYSTOLIC BLOOD PRESSURE < 130 MM HG: ICD-10-PCS | Mod: HCNC,CPTII,S$GLB, | Performed by: INTERNAL MEDICINE

## 2023-08-01 PROCEDURE — 1126F AMNT PAIN NOTED NONE PRSNT: CPT | Mod: HCNC,CPTII,S$GLB, | Performed by: INTERNAL MEDICINE

## 2023-08-01 PROCEDURE — 3044F PR MOST RECENT HEMOGLOBIN A1C LEVEL <7.0%: ICD-10-PCS | Mod: HCNC,CPTII,S$GLB, | Performed by: INTERNAL MEDICINE

## 2023-08-01 PROCEDURE — 1157F ADVNC CARE PLAN IN RCRD: CPT | Mod: HCNC,CPTII,S$GLB, | Performed by: INTERNAL MEDICINE

## 2023-08-01 PROCEDURE — 1159F PR MEDICATION LIST DOCUMENTED IN MEDICAL RECORD: ICD-10-PCS | Mod: HCNC,CPTII,S$GLB, | Performed by: INTERNAL MEDICINE

## 2023-08-01 PROCEDURE — 1101F PR PT FALLS ASSESS DOC 0-1 FALLS W/OUT INJ PAST YR: ICD-10-PCS | Mod: HCNC,CPTII,S$GLB, | Performed by: INTERNAL MEDICINE

## 2023-08-01 PROCEDURE — 99999 PR PBB SHADOW E&M-EST. PATIENT-LVL V: CPT | Mod: PBBFAC,HCNC,, | Performed by: INTERNAL MEDICINE

## 2023-08-01 PROCEDURE — 3008F PR BODY MASS INDEX (BMI) DOCUMENTED: ICD-10-PCS | Mod: HCNC,CPTII,S$GLB, | Performed by: INTERNAL MEDICINE

## 2023-08-01 PROCEDURE — 3288F PR FALLS RISK ASSESSMENT DOCUMENTED: ICD-10-PCS | Mod: HCNC,CPTII,S$GLB, | Performed by: INTERNAL MEDICINE

## 2023-08-01 PROCEDURE — 96413 CHEMO IV INFUSION 1 HR: CPT | Mod: HCNC

## 2023-08-01 PROCEDURE — 63600175 PHARM REV CODE 636 W HCPCS: Mod: JZ,JG,HCNC | Performed by: INTERNAL MEDICINE

## 2023-08-01 PROCEDURE — 3044F HG A1C LEVEL LT 7.0%: CPT | Mod: HCNC,CPTII,S$GLB, | Performed by: INTERNAL MEDICINE

## 2023-08-01 PROCEDURE — 3288F FALL RISK ASSESSMENT DOCD: CPT | Mod: HCNC,CPTII,S$GLB, | Performed by: INTERNAL MEDICINE

## 2023-08-01 PROCEDURE — 3078F DIAST BP <80 MM HG: CPT | Mod: HCNC,CPTII,S$GLB, | Performed by: INTERNAL MEDICINE

## 2023-08-01 PROCEDURE — 99215 OFFICE O/P EST HI 40 MIN: CPT | Mod: HCNC,S$GLB,, | Performed by: INTERNAL MEDICINE

## 2023-08-01 PROCEDURE — 4010F PR ACE/ARB THEARPY RXD/TAKEN: ICD-10-PCS | Mod: HCNC,CPTII,S$GLB, | Performed by: INTERNAL MEDICINE

## 2023-08-01 PROCEDURE — 3074F SYST BP LT 130 MM HG: CPT | Mod: HCNC,CPTII,S$GLB, | Performed by: INTERNAL MEDICINE

## 2023-08-01 PROCEDURE — 4010F ACE/ARB THERAPY RXD/TAKEN: CPT | Mod: HCNC,CPTII,S$GLB, | Performed by: INTERNAL MEDICINE

## 2023-08-01 RX ORDER — HEPARIN 100 UNIT/ML
500 SYRINGE INTRAVENOUS
OUTPATIENT
Start: 2023-08-14

## 2023-08-01 RX ORDER — DIPHENHYDRAMINE HYDROCHLORIDE 50 MG/ML
50 INJECTION INTRAMUSCULAR; INTRAVENOUS ONCE AS NEEDED
OUTPATIENT
Start: 2023-08-14

## 2023-08-01 RX ORDER — SODIUM CHLORIDE 0.9 % (FLUSH) 0.9 %
10 SYRINGE (ML) INJECTION
Status: DISCONTINUED | OUTPATIENT
Start: 2023-08-01 | End: 2023-08-01 | Stop reason: HOSPADM

## 2023-08-01 RX ORDER — SODIUM CHLORIDE 0.9 % (FLUSH) 0.9 %
10 SYRINGE (ML) INJECTION
Status: CANCELLED | OUTPATIENT
Start: 2023-08-01

## 2023-08-01 RX ORDER — EPINEPHRINE 0.3 MG/.3ML
0.3 INJECTION SUBCUTANEOUS ONCE AS NEEDED
Status: CANCELLED | OUTPATIENT
Start: 2023-08-01

## 2023-08-01 RX ORDER — DIPHENHYDRAMINE HYDROCHLORIDE 50 MG/ML
50 INJECTION INTRAMUSCULAR; INTRAVENOUS ONCE AS NEEDED
Status: CANCELLED | OUTPATIENT
Start: 2023-08-01

## 2023-08-01 RX ORDER — EPINEPHRINE 0.3 MG/.3ML
0.3 INJECTION SUBCUTANEOUS ONCE AS NEEDED
Status: DISCONTINUED | OUTPATIENT
Start: 2023-08-01 | End: 2023-08-01 | Stop reason: HOSPADM

## 2023-08-01 RX ORDER — DIPHENHYDRAMINE HYDROCHLORIDE 50 MG/ML
50 INJECTION INTRAMUSCULAR; INTRAVENOUS ONCE AS NEEDED
Status: DISCONTINUED | OUTPATIENT
Start: 2023-08-01 | End: 2023-08-01 | Stop reason: HOSPADM

## 2023-08-01 RX ORDER — EPINEPHRINE 0.3 MG/.3ML
0.3 INJECTION SUBCUTANEOUS ONCE AS NEEDED
OUTPATIENT
Start: 2023-08-14

## 2023-08-01 RX ORDER — HEPARIN 100 UNIT/ML
500 SYRINGE INTRAVENOUS
Status: DISCONTINUED | OUTPATIENT
Start: 2023-08-01 | End: 2023-08-01 | Stop reason: HOSPADM

## 2023-08-01 RX ORDER — HEPARIN 100 UNIT/ML
500 SYRINGE INTRAVENOUS
Status: CANCELLED | OUTPATIENT
Start: 2023-08-01

## 2023-08-01 RX ORDER — SODIUM CHLORIDE 0.9 % (FLUSH) 0.9 %
10 SYRINGE (ML) INJECTION
OUTPATIENT
Start: 2023-08-14

## 2023-08-01 RX ADMIN — SODIUM CHLORIDE 81 MG: 9 INJECTION, SOLUTION INTRAVENOUS at 12:08

## 2023-08-01 RX ADMIN — SODIUM CHLORIDE: 9 INJECTION, SOLUTION INTRAVENOUS at 11:08

## 2023-08-01 RX ADMIN — Medication 10 ML: at 12:08

## 2023-08-01 RX ADMIN — SODIUM CHLORIDE 360 MG: 9 INJECTION, SOLUTION INTRAVENOUS at 11:08

## 2023-08-01 NOTE — PLAN OF CARE
1048-Labs, hx, and medications reviewed, pt meets parameters for treatment today. Assessment completed and plan of care reviewed. Pt verbalized understanding. PIV accessed with no complications. Pt voices no new complaints or concerns, will continue to monitor for safety.

## 2023-08-01 NOTE — PLAN OF CARE
1240-Pt tolerated Opdivo and Yervoy well today, no complaints or complications. VSS.  Pt aware to call provider with any questions or concerns and is aware of upcoming appts. Pt ambulatory from clinic with steady gait, no distress noted.

## 2023-08-01 NOTE — PROGRESS NOTES
ONCOLOGY FOLLOW UP VISIT.     Reason for visit: Toxicity visit on 9LA for recurrent stage III NSCLC    Cancer/Stage/TNM:    Cancer Staging   NSCLC of right lung  Staging form: Lung, AJCC 8th Edition  - Clinical stage from 9/29/2020: Stage IIIA (cT3, cN1, cM0) - Signed by Ramo Tucker MD on 10/24/2020         Oncology History   NSCLC of right lung   9/29/2020 Cancer Staged    Staging form: Lung, AJCC 8th Edition  - Clinical stage from 9/29/2020: Stage IIIA (cT3, cN1, cM0)     10/14/2020 Initial Diagnosis    NSCLC of right lung     11/17/2020 - 12/30/2020 Radiation Therapy    Treating physician: Harvinder Lara     Site  Technique  Energy  Dose/Fx (Gy)  #Fx  Total Dose (Gy)    RLL Lung  VMAT  6X  2  30 / 30  60         2/6/2023 -  Chemotherapy    Treatment Summary   Plan Name: OP NSCLC NIVOLUMAB 360 MG D1 & D22 WITH IPILIMUMAB 1 MG/KG Q6W PLUS CARBOPLATIN (AUC) PACLITAXEL Q3W X2 DOSES  Treatment Goal: Control  Status: Active  Start Date: 2/6/2023  End Date: 2/10/2025 (Planned)  Provider: Javi Avina MD  Chemotherapy: CARBOplatin (PARAPLATIN) 525 mg in sodium chloride 0.9% 337.5 mL chemo infusion, 525 mg, Intravenous, Clinic/HOD 1 time, 1 of 1 cycle  Administration: 525 mg (2/6/2023), 490 mg (2/27/2023)  PACLitaxeL (TAXOL) 200 mg/m2 = 396 mg in sodium chloride 0.9% 500 mL chemo infusion, 200 mg/m2 = 396 mg (100 % of original dose 200 mg/m2), Intravenous, Clinic/HOD 1 time, 1 of 1 cycle  Dose modification: 200 mg/m2 (original dose 200 mg/m2, Cycle 1), 200 mg/m2 (original dose 200 mg/m2, Cycle 1)  Administration: 396 mg (2/6/2023), 396 mg (2/27/2023)     6/12/2023 - 6/30/2023 Radiation Therapy    Treating physician: Harvinder Lara    Site Technique Energy Dose/Fx (Gy) #Fx Total Dose (Gy)   RLL Lung RtLung 6X 4 15 / 15 60             Interval History:   Today, patient reports feeling well overall. Tolerated radiation well, except she went to ED a few days after completing with dehydration. Denies significant  SOB. She has been active, energy is improved.     ROS:  Review of Systems   Constitutional:  Negative for chills, fever and weight loss.   HENT:  Negative for hearing loss, nosebleeds and sore throat.    Eyes:  Negative for blurred vision and double vision.   Respiratory:  Negative for cough, hemoptysis and shortness of breath.    Cardiovascular:  Negative for chest pain and leg swelling.   Gastrointestinal:  Negative for abdominal pain, blood in stool, diarrhea, heartburn, nausea and vomiting.   Genitourinary:  Negative for dysuria, frequency and hematuria.   Musculoskeletal:  Negative for back pain, joint pain and myalgias.   Skin:  Negative for itching and rash.   Neurological:  Negative for dizziness, tingling, sensory change, speech change and headaches.   Endo/Heme/Allergies:  Does not bruise/bleed easily.   Psychiatric/Behavioral:  The patient is not nervous/anxious.           A complete 12-point review of systems was reviewed and is negative except as mentioned above.     Past Medical History:   Past Medical History:   Diagnosis Date    AICD (automatic cardioverter/defibrillator) present     Asthma     bronchitis in past    Breast cancer 2016    right    Cardiac pacemaker     Cardiomyopathy     COPD (chronic obstructive pulmonary disease)     Diabetes mellitus, type 2     Hyperglycemia     Hyperlipidemia     Hypertension     Malignant neoplasm of overlapping sites of female breast 2/12/2016    Nuclear sclerosis of both eyes 8/12/2020    Respiratory distress 3/12/2020        Allergies:   Review of patient's allergies indicates:   Allergen Reactions    Taxol [paclitaxel]      Hypersensitivity reaction to taxol, symptoms included shortness of breath, nausea, dizziness, flushing     Carboplatin Other (See Comments)     Itching and hives    Adhesive Rash     tegaderm burns and blistered skin        Medications:   Current Outpatient Medications   Medication Sig Dispense Refill    ACCU-CHEK ALFRED  PLUS TEST STRP Strp USE TO TEST THREE TIMES DAILY 200 strip 3    albuterol sulfate (PROAIR RESPICLICK) 90 mcg/actuation AePB Inhale 2 puffs into the lungs every 4 (four) hours as needed. Rescue 1 each 5    amLODIPine (NORVASC) 5 MG tablet TAKE 1 TABLET BY MOUTH EVERY DAY (Patient taking differently: Take 5 mg by mouth 2 (two) times daily.) 90 tablet 3    atorvastatin (LIPITOR) 10 MG tablet Take 1 tablet (10 mg total) by mouth once daily. (Patient taking differently: Take 10 mg by mouth once daily. Patient taking EOD) 90 tablet 3    buPROPion (WELLBUTRIN XL) 150 MG TB24 tablet Take 1 tablet (150 mg total) by mouth once daily. 90 tablet 0    CARBOplatin (PARAPLATIN) 10 mg/mL injection       cetirizine (ZYRTEC) 10 MG tablet Take 10 mg by mouth every evening.       CINVANTI 7.2 mg/mL Emul injection       dexAMETHasone (DECADRON) 4 mg/mL injection       diphenhydrAMINE (BENADRYL) 50 mg/mL injection       losartan (COZAAR) 100 MG tablet Take 1 tablet (100 mg total) by mouth once daily. 90 tablet 2    metFORMIN (GLUCOPHAGE) 500 MG tablet Take 2 tablets (1,000 mg total) by mouth 2 (two) times daily with meals. 360 tablet 3    metoprolol succinate (TOPROL-XL) 100 MG 24 hr tablet Take 1 tablet (100 mg total) by mouth once daily. 90 tablet 3    multivitamin (THERAGRAN) per tablet Take 1 tablet by mouth once daily.      mupirocin (BACTROBAN) 2 % ointment Apply topically 3 (three) times daily. 30 g 1    ondansetron (ZOFRAN-ODT) 8 MG TbDL Take 1 tablet (8 mg total) by mouth every 8 (eight) hours as needed (nausea/vomiting). Take 1 tablet (8 mg) by mouth every 8 hours as needed for nausea/vomiting. 60 tablet 5    OPDIVO 120 mg/12 mL Soln       OPDIVO 240 mg/24 mL Soln       OPDIVO 40 mg/4 mL injection       palonosetron (ALOXI) 0.25 mg/5 mL injection       pantoprazole (PROTONIX) 40 MG tablet Take 1 tablet (40 mg total) by mouth once daily. 90 tablet 3    predniSONE (DELTASONE) 10 MG tablet Take 1 tablet (10  "mg total) by mouth once daily. Take 4 tabs x 3 days, then take 2 tabs x 3 days, then take 1 tab x 3 days. 21 tablet 0    prochlorperazine (COMPAZINE) 10 MG tablet Take 1 tablet (10 mg total) by mouth every 6 (six) hours as needed (nausea/vomiting - second choice). 30 tablet 1    READI-CAT 2 2 % (w/v) suspension       semaglutide (OZEMPIC) 0.25 mg or 0.5 mg(2 mg/1.5 mL) pen injector Inject 0.5 mg into the skin every 7 days. 3 each 3    sertraline (ZOLOFT) 100 MG tablet TAKE 1 TABLET BY MOUTH EVERY EVENING. 90 tablet 3    tiotropium (SPIRIVA WITH HANDIHALER) 18 mcg inhalation capsule INHALE 1 CAPSULE BY MOUTH EVERY DAY. 90 capsule 3    triamcinolone acetonide 0.1% (KENALOG) 0.1 % cream Apply topically 2 (two) times daily. 453.6 g 2    WIXELA INHUB 250-50 mcg/dose diskus inhaler INHALE 1 PUFF INTO THE LUNGS 2 (TWO) TIMES DAILY. CONTROLLER 180 each 3    YERVOY 50 mg/10 mL (5 mg/mL)       OLANZapine (ZYPREXA) 5 MG tablet Take 1 tablet (5 mg total) by mouth every evening. Take 1 tablet by mouth every evening on days 1-4 of each chemotherapy cycle (Patient not taking: Reported on 7/12/2023) 30 tablet 1     No current facility-administered medications for this visit.     Facility-Administered Medications Ordered in Other Visits   Medication Dose Route Frequency Provider Last Rate Last Admin    fentaNYL injection 25 mcg  25 mcg Intravenous Q5 Min PRN Keesha Martins MD        haloperidol lactate injection 0.5 mg  0.5 mg Intravenous Once PRN Keesha Martins MD        HYDROmorphone injection 0.2 mg  0.2 mg Intravenous Q5 Min PRN Keesha Martins MD        ondansetron injection 4 mg  4 mg Intravenous Once PRN Keesha Martins MD        sodium chloride 0.9% flush 10 mL  10 mL Intravenous PRN Keesha Martins MD            Physical Exam:   /77 (BP Location: Left arm, Patient Position: Sitting, BP Method: Medium (Automatic))   Pulse 108   Temp 98.4 °F (36.9 °C) (Oral)   Resp 18   Ht 5' 2" (1.575 m)   Wt 81 kg (178 lb 9.2 oz)   LMP  " (LMP Unknown)   SpO2 (!) 94%   BMI 32.66 kg/m²      ECOG Performance Status: (foot note - ECOG PS provided by Eastern Cooperative Oncology Group) 0 - Asymptomatic    Physical Exam  Vitals and nursing note reviewed.   Constitutional:       Appearance: Normal appearance. She is well-developed and normal weight.   HENT:      Head: Normocephalic and atraumatic.   Eyes:      Conjunctiva/sclera: Conjunctivae normal.      Pupils: Pupils are equal, round, and reactive to light.   Pulmonary:      Effort: Pulmonary effort is normal. No respiratory distress.   Abdominal:      General: There is no distension.      Palpations: Abdomen is soft.   Musculoskeletal:         General: No swelling. Normal range of motion.      Cervical back: Normal range of motion and neck supple.      Left ankle: Swelling (trace) present.   Lymphadenopathy:      Cervical: No cervical adenopathy.   Skin:     General: Skin is warm and dry.      Findings: Rash (scalp) present.      Comments: Honey crusted lesions to scalp   Neurological:      General: No focal deficit present.      Mental Status: She is alert and oriented to person, place, and time.   Psychiatric:         Mood and Affect: Mood and affect normal.         Behavior: Behavior normal.               Labs:   Recent Results (from the past 48 hour(s))   CBC W/ AUTO DIFFERENTIAL    Collection Time: 08/01/23  8:15 AM   Result Value Ref Range    WBC 5.83 3.90 - 12.70 K/uL    RBC 4.33 4.00 - 5.40 M/uL    Hemoglobin 11.7 (L) 12.0 - 16.0 g/dL    Hematocrit 37.5 37.0 - 48.5 %    MCV 87 82 - 98 fL    MCH 27.0 27.0 - 31.0 pg    MCHC 31.2 (L) 32.0 - 36.0 g/dL    RDW 17.0 (H) 11.5 - 14.5 %    Platelets 197 150 - 450 K/uL    MPV 11.3 9.2 - 12.9 fL    Immature Granulocytes 0.9 (H) 0.0 - 0.5 %    Gran # (ANC) 4.4 1.8 - 7.7 K/uL    Immature Grans (Abs) 0.05 (H) 0.00 - 0.04 K/uL    Lymph # 0.6 (L) 1.0 - 4.8 K/uL    Mono # 0.6 0.3 - 1.0 K/uL    Eos # 0.1 0.0 - 0.5 K/uL    Baso # 0.05 0.00 - 0.20 K/uL    nRBC 0  0 /100 WBC    Gran % 75.8 (H) 38.0 - 73.0 %    Lymph % 10.6 (L) 18.0 - 48.0 %    Mono % 10.1 4.0 - 15.0 %    Eosinophil % 1.7 0.0 - 8.0 %    Basophil % 0.9 0.0 - 1.9 %    Differential Method Automated    CMP    Collection Time: 08/01/23  8:15 AM   Result Value Ref Range    Sodium 141 136 - 145 mmol/L    Potassium 4.2 3.5 - 5.1 mmol/L    Chloride 105 95 - 110 mmol/L    CO2 23 23 - 29 mmol/L    Glucose 144 (H) 70 - 110 mg/dL    BUN 19 8 - 23 mg/dL    Creatinine 1.0 0.5 - 1.4 mg/dL    Calcium 10.1 8.7 - 10.5 mg/dL    Total Protein 7.4 6.0 - 8.4 g/dL    Albumin 3.6 3.5 - 5.2 g/dL    Total Bilirubin 0.3 0.1 - 1.0 mg/dL    Alkaline Phosphatase 49 (L) 55 - 135 U/L    AST 25 10 - 40 U/L    ALT 20 10 - 44 U/L    eGFR >60.0 >60 mL/min/1.73 m^2    Anion Gap 13 8 - 16 mmol/L   TSH    Collection Time: 08/01/23  8:15 AM   Result Value Ref Range    TSH 5.131 (H) 0.400 - 4.000 uIU/mL   FREE T4    Collection Time: 08/01/23  8:15 AM   Result Value Ref Range    Free T4 0.86 0.71 - 1.51 ng/dL            Imaging:   CT Chest Abdomen Pelvis With Contrast  Narrative: EXAMINATION:  CT CHEST ABDOMEN PELVIS WITH CONTRAST (XPD)    CLINICAL HISTORY:  Non-small cell lung cancer (NSCLC), metastatic, assess treatment response; Malignant neoplasm of unspecified part of right bronchus or lung    TECHNIQUE:  Low dose axial images, sagittal and coronal reformations were obtained from the thoracic inlet to the pubic symphysis following the IV administration of 75 mL of Omnipaque 350    COMPARISON:  04/27/2023    FINDINGS:  Lungs: Large right lower lobe opacity extending from the hilum to the pleura with mild volume loss, very similar to the prior exam.  The mass measures 6.7 x 4.0 cm at series 6, image 277.    There are additional small scattered ground-glass and ill-defined nodular opacities, improved in the upper lobes but worsened in the left lower lobe (series 604, images 174-219) since the prior exam.  No emphysematous lung changes.  The  tracheobronchial tree is clear.    Mediastinum: Heart size enlarged measuring 14.3 cm (series 2, image 74).  No pericardial or pleural effusion.  Thoracic aorta normal caliber.  There is moderate scattered calcific atherosclerosis.  No mediastinal or axillary lymphadenopathy.    Abdomen: No liver masses.  The main portal veins are patent.  Prior cholecystectomy.  Mild prominence of the common bile duct.  No pancreatic ductal dilatation.  Splenic size within normal limits.  Slightly micro nodular thickening of the adrenal glands.  No renal masses or hydronephrosis.  Abdominal aorta tapers normally.  Moderate scattered calcific atherosclerosis.    Pelvis: Bladder is partially decompressed.  Normal sized uterus with probable small uterine fibroids.  Adnexa are unremarkable.  No inguinal or pelvic lymphadenopathy.  No fluid collections.    Bowel: The terminal ileum and appendix are unremarkable.  No dilated loops of bowel.  No mesenteric lymphadenopathy.    Bones: Multilevel lumbar spondylosis and degenerative changes of the hips.  No marrow replacement process.  Impression: Patient history of metastatic non-small cell lung cancer.    Large residual opacity in the right lower lobe, as above, similar to the 04/27/2023 exam.    Additional small scattered ground-glass and ill-defined nodular opacities, overall improved in the upper lobes but worsened in the left lower lobe since the prior exam, possible post radiation changes, pneumonitis, or new areas of neoplastic involvement.  Continued imaging follow-up could be performed to ensure stability/resolution.    No evidence of metastatic disease to the abdomen pelvis.    Electronically signed by: Ankur Santo MD  Date:    07/31/2023  Time:    11:07            Diagnoses:       1. NSCLC of right lung    2. Radiation pneumonitis    3. History of breast cancer in female    4. Dilated cardiomyopathy    5. Left bundle-branch block    6. Cardiac resynchronization therapy  defibrillator (CRT-D) in place    7. Essential hypertension        Assessment and Plan:         1. Recurrent NSCLC  Plan is for definitive chemoRT, will need re-staging scans prior.  Reviewed with patient in detail her diagnosis, stage, treatment options, and prognosis (3 year OS 66% vs. 43.5% without durvalumab) with standard of care chemoRT followed by maintenance immunotherapy.  Patient has consented for [id number] clinical trial, a randomized control trial evaluating combination chemoRT + durvalumab followed by maintenance vs. SOC chemoRT -> maintenance.  CT scans 7/2021 with CR.  - patient randomized on [id number] to SOC arm (Imfinzi) - completed 12/2021.    - CT scan 12/2022 with progressing pulmonary nodule in LLL, still with stable disease in RLL consolidation. PET scan also showing enlarging right lower lobe mass with increased hypermetabolic activity concerning for recurrence. Will present her case at the next thoracic tumor board to discuss biopsy and possible treatment options.   - Biopsy of lung mass consistent with SCC. Initiated on 9LA. Initial re-staging scans with stable disease.   - Patient has completed RT 4-5 weeks ago and has no symptoms. She has some mild inflammation on Chest CT, but since asymptomatic and completed RT will re-initiate IO.    2. There was increase in GGOs on recent CT, but patient is asymptomatic. Continue COPD maintenance inhalers and she has duonebs prn. Low threshold to discontinue IO and give steroid course if she develops worsening cough or SOB. Patient counseled to contact us immediately with any new respiratory symptoms.     3. 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      4-7.  Stable, follows with cardiology. AICD placed, but patient now has recovered EF and only takes lasix prn.  NYHA class I.    Patient is in agreement with the proposed treatment plan. All questions were answered to the patient's satisfaction. Pt knows to call clinic if anything is  needed before the next clinic visit.    MDM includes:    - Acute or chronic illness or injury that poses a threat to life or bodily function  - Independent review and explanation of 2 results from unique tests  - Discussion of management and ordering 2 unique tests  - Extensive discussion of treatment and management  - Prescription drug management  - Drug therapy requiring intensive monitoring for toxicity    Javi Avina M.D.  Hematology/Oncology Attending  Director [facility]  [facility]          Route Chart for Scheduling    Med Onc Chart Routing      Follow up with physician    Follow up with DANIEL 3 weeks. with CBC, CMP prior to next cycle of Opdivo   Infusion scheduling note    Injection scheduling note    Labs CBC and CMP   Scheduling:  Preferred lab:  Lab interval: every 3 weeks     Imaging    Pharmacy appointment    Other referrals        Treatment Plan Information   OP NSCLC NIVOLUMAB 360 MG D1 & D22 WITH IPILIMUMAB 1 MG/KG Q6W PLUS CARBOPLATIN (AUC) PACLITAXEL Q3W X2 DOSES   Javi Avina MD   Upcoming Treatment Dates - OP NSCLC NIVOLUMAB 360 MG D1 & D22 WITH IPILIMUMAB 1 MG/KG Q6W PLUS CARBOPLATIN (AUC) PACLITAXEL Q3W X2 DOSES    7/24/2023       Immunotherapy       nivolumab 360 mg in sodium chloride 0.9% 86 mL infusion       ipilimumab (YERVOY) 1 mg/kg = 91 mg in sodium chloride 0.9% 68.2 mL chemo infusion  8/14/2023       Immunotherapy       nivolumab 360 mg in sodium chloride 0.9% 86 mL infusion  9/4/2023       Immunotherapy       nivolumab 360 mg in sodium chloride 0.9% 86 mL infusion       ipilimumab (YERVOY) 1 mg/kg = 91 mg in sodium chloride 0.9% 68.2 mL chemo infusion  9/25/2023       Immunotherapy       nivolumab 360 mg in sodium chloride 0.9% 86 mL infusion

## 2023-08-02 ENCOUNTER — OFFICE VISIT (OUTPATIENT)
Dept: RADIATION ONCOLOGY | Facility: CLINIC | Age: 68
End: 2023-08-02
Payer: MEDICARE

## 2023-08-02 VITALS
SYSTOLIC BLOOD PRESSURE: 138 MMHG | HEIGHT: 62 IN | WEIGHT: 182 LBS | OXYGEN SATURATION: 91 % | BODY MASS INDEX: 33.49 KG/M2 | DIASTOLIC BLOOD PRESSURE: 64 MMHG | TEMPERATURE: 98 F | RESPIRATION RATE: 18 BRPM | HEART RATE: 105 BPM

## 2023-08-02 DIAGNOSIS — C34.91 NSCLC OF RIGHT LUNG: Primary | ICD-10-CM

## 2023-08-02 PROCEDURE — 1101F PT FALLS ASSESS-DOCD LE1/YR: CPT | Mod: HCNC,CPTII,S$GLB, | Performed by: RADIOLOGY

## 2023-08-02 PROCEDURE — 3078F PR MOST RECENT DIASTOLIC BLOOD PRESSURE < 80 MM HG: ICD-10-PCS | Mod: HCNC,CPTII,S$GLB, | Performed by: RADIOLOGY

## 2023-08-02 PROCEDURE — 3075F PR MOST RECENT SYSTOLIC BLOOD PRESS GE 130-139MM HG: ICD-10-PCS | Mod: HCNC,CPTII,S$GLB, | Performed by: RADIOLOGY

## 2023-08-02 PROCEDURE — 1159F PR MEDICATION LIST DOCUMENTED IN MEDICAL RECORD: ICD-10-PCS | Mod: HCNC,CPTII,S$GLB, | Performed by: RADIOLOGY

## 2023-08-02 PROCEDURE — 99999 PR PBB SHADOW E&M-EST. PATIENT-LVL V: CPT | Mod: PBBFAC,HCNC,, | Performed by: RADIOLOGY

## 2023-08-02 PROCEDURE — 99999 PR PBB SHADOW E&M-EST. PATIENT-LVL V: ICD-10-PCS | Mod: PBBFAC,HCNC,, | Performed by: RADIOLOGY

## 2023-08-02 PROCEDURE — 3288F FALL RISK ASSESSMENT DOCD: CPT | Mod: HCNC,CPTII,S$GLB, | Performed by: RADIOLOGY

## 2023-08-02 PROCEDURE — 1159F MED LIST DOCD IN RCRD: CPT | Mod: HCNC,CPTII,S$GLB, | Performed by: RADIOLOGY

## 2023-08-02 PROCEDURE — 99024 PR POST-OP FOLLOW-UP VISIT: ICD-10-PCS | Mod: HCNC,S$GLB,, | Performed by: RADIOLOGY

## 2023-08-02 PROCEDURE — 1101F PR PT FALLS ASSESS DOC 0-1 FALLS W/OUT INJ PAST YR: ICD-10-PCS | Mod: HCNC,CPTII,S$GLB, | Performed by: RADIOLOGY

## 2023-08-02 PROCEDURE — 3044F PR MOST RECENT HEMOGLOBIN A1C LEVEL <7.0%: ICD-10-PCS | Mod: HCNC,CPTII,S$GLB, | Performed by: RADIOLOGY

## 2023-08-02 PROCEDURE — 3075F SYST BP GE 130 - 139MM HG: CPT | Mod: HCNC,CPTII,S$GLB, | Performed by: RADIOLOGY

## 2023-08-02 PROCEDURE — 3008F BODY MASS INDEX DOCD: CPT | Mod: HCNC,CPTII,S$GLB, | Performed by: RADIOLOGY

## 2023-08-02 PROCEDURE — 3044F HG A1C LEVEL LT 7.0%: CPT | Mod: HCNC,CPTII,S$GLB, | Performed by: RADIOLOGY

## 2023-08-02 PROCEDURE — 3078F DIAST BP <80 MM HG: CPT | Mod: HCNC,CPTII,S$GLB, | Performed by: RADIOLOGY

## 2023-08-02 PROCEDURE — 4010F ACE/ARB THERAPY RXD/TAKEN: CPT | Mod: HCNC,CPTII,S$GLB, | Performed by: RADIOLOGY

## 2023-08-02 PROCEDURE — 1157F ADVNC CARE PLAN IN RCRD: CPT | Mod: HCNC,CPTII,S$GLB, | Performed by: RADIOLOGY

## 2023-08-02 PROCEDURE — 3288F PR FALLS RISK ASSESSMENT DOCUMENTED: ICD-10-PCS | Mod: HCNC,CPTII,S$GLB, | Performed by: RADIOLOGY

## 2023-08-02 PROCEDURE — 4010F PR ACE/ARB THEARPY RXD/TAKEN: ICD-10-PCS | Mod: HCNC,CPTII,S$GLB, | Performed by: RADIOLOGY

## 2023-08-02 PROCEDURE — 1157F PR ADVANCE CARE PLAN OR EQUIV PRESENT IN MEDICAL RECORD: ICD-10-PCS | Mod: HCNC,CPTII,S$GLB, | Performed by: RADIOLOGY

## 2023-08-02 PROCEDURE — 3008F PR BODY MASS INDEX (BMI) DOCUMENTED: ICD-10-PCS | Mod: HCNC,CPTII,S$GLB, | Performed by: RADIOLOGY

## 2023-08-02 PROCEDURE — 99024 POSTOP FOLLOW-UP VISIT: CPT | Mod: HCNC,S$GLB,, | Performed by: RADIOLOGY

## 2023-08-02 NOTE — PROGRESS NOTES
"08/02/2023    Radiation Oncology Follow-Up Visit    Prior Radiation History:   Course 1:   Site  Technique  Energy  Dose/Fx (Gy)  #Fx  Total Dose (Gy)  Start Date  End Date  Elapsed Days    RLL Lung  VMAT  6X  2  30 / 30  60  11/17/2020 12/30/2020  43      Course 2:    Site  Technique  Energy  Dose/Fx (Gy)  #Fx  Total Dose (Gy)  Start Date  End Date  Elapsed Days    RLL Lung  RtLung  6X  4  15 / 15  60  6/12/2023 6/30/2023  18        Assessment   This is a 67 y.o. y/o female with Stage IIIA (cT3 cN1 M0) RLL NSCLC (squam) diagnosed on EBUS biopsy of 11R node 10/13/20. Resection would require bilobectomy, which her PFT's could not support. She enrolled in TG9360 "Randomized Phase III Trial of OCIC2202 (Durvalumab) as Concurrent and Consolidative Therapy or Consolidative Therapy Alone for Unresectable Stage 3 NSCLC " and completed definitive chemo-RT to 60 Gy in 30 fx on 12/30/20. In 1/2023 she developed biopsy-confirmed recurrence in the RLL with no other evidence of disease on PET or CT Head w/ contrast. She was treated with combined immunotherapy. Re-staging CT C/A/P 4/27/23 without evidence of progressive or new disease. She completed definitive re-irradiation 60 Gy in 15 fx to RLL on 6/30/23.     About a week after end of treatment, she went to ED and was treated for dehydration. She improved after that. Today she feels well and denies pain or difficulty swallowing, dyspnea, or chest pain. CT C/A/P 7/31/23 with stable size of RLL consolidation.      I suspect that now following 2 courses of radiation she has significant fibrosis in that area that is unlikely to further decrease in size. Would primarily watch for growth in the future, and obtain PET/CT at the time of growth to assess for worsening fibrosis vs recurrent disease.        Plan   1) I will see her back in 6 months.   2) Follow-up with Dr. Avina for continued systemic management.        Chief Complaint   Patient presents with    Follow-up       HPI: "        Past Medical History:   Diagnosis Date    AICD (automatic cardioverter/defibrillator) present     Asthma     bronchitis in past    Breast cancer 2016    right    Cardiac pacemaker     Cardiomyopathy     COPD (chronic obstructive pulmonary disease)     Diabetes mellitus, type 2     Hyperglycemia     Hyperlipidemia     Hypertension     Malignant neoplasm of overlapping sites of female breast 2016    Nuclear sclerosis of both eyes 2020    Respiratory distress 3/12/2020       Past Surgical History:   Procedure Laterality Date    BIOPSY, WITH CT GUIDANCE Right 2023    Procedure: LUNG MASS BIOPSY, WITH CT GUIDANCE;  Surgeon: Yehuda Green MD;  Location: [facility] CATH LAB;  Service: Radiology;  Laterality: Right;    BREAST BIOPSY Right 2016    IDC    BREAST LUMPECTOMY Right     CARDIAC CATHETERIZATION Bilateral 2017    CARDIAC DEFIBRILLATOR PLACEMENT Left 08/10/2017    CARDIAC DEFIBRILLATOR PLACEMENT Left 2018     SECTION      x2    CHOLECYSTECTOMY      INSERTION OF TUNNELED CENTRAL VENOUS CATHETER (CVC) WITH SUBCUTANEOUS PORT Right 2020    Procedure: [id number], RIGHT;  Surgeon: Josefa Caceres MD;  Location: [address];  Service: General;  Laterality: Right;  Port-a-cath placed to R. IJ    LUNG BIOPSY N/A 2020    Procedure: BIOPSY, LUNG;  Surgeon: [facility] Diagnostic Provider;  Location: [facility] OR;  Service: Radiology;  Laterality: N/A;  8AM START  RN PREOP 2020---COVID NEGATIVE    NAVIGATIONAL BRONCHOSCOPY N/A 10/13/2020    Procedure: BRONCHOSCOPY, NAVIGATIONAL;  Surgeon: Jamilah Weaver MD;  Location: [address];  Service: Pulmonary;  Laterality: N/A;    REVISION OF IMPLANTABLE CARDIOVERTER-DEFIBRILLATOR (ICD) ELECTRODE LEAD PLACEMENT N/A 6/15/2018    Procedure: REVISION-LEAD-ICD;  Surgeon: Javy Gates MD;  Location: [facility] CATH LAB;  Service: Cardiology;  Laterality: N/A;  LBBB, Bi-V ICD HIS JEAN MARIE Witt,  Choice, MB, 3 Prep    ROBOT-ASSISTED LAPAROSCOPIC LYMPHADENECTOMY USING DA SALVADOR XI Right 10/23/2020    Procedure: XI ROBOTIC LYMPHADENECTOMY;  Surgeon: Ramo Tucker MD;  Location: [facility] OR [address] [place] [address];  Service: Thoracic;  Laterality: Right;    TUBAL LIGATION         Social History     Tobacco Use    Smoking status: Former     Current packs/day: 0.00     Average packs/day: 0.5 packs/day for 40.0 years (20.0 ttl pk-yrs)     Types: Cigarettes     Start date: 1976     Quit date: 2016     Years since quittin.0     Passive exposure: Past    Smokeless tobacco: Never   Substance Use Topics    Alcohol use: Yes     Alcohol/week: 1.0 standard drink of alcohol     Types: 1 Glasses of wine per week     Comment: rare- holiday    Drug use: No       Cancer-related family history is negative for Breast cancer, Ovarian cancer, and Cancer.    Current Outpatient Medications on File Prior to Visit   Medication Sig Dispense Refill    ACCU-CHEK ALFRED PLUS TEST STRP Strp USE TO TEST THREE TIMES DAILY 200 strip 3    albuterol sulfate (PROAIR RESPICLICK) 90 mcg/actuation AePB Inhale 2 puffs into the lungs every 4 (four) hours as needed. Rescue 1 each 5    amLODIPine (NORVASC) 5 MG tablet TAKE 1 TABLET BY MOUTH EVERY DAY (Patient taking differently: Take 5 mg by mouth 2 (two) times daily.) 90 tablet 3    atorvastatin (LIPITOR) 10 MG tablet Take 1 tablet (10 mg total) by mouth once daily. (Patient taking differently: Take 10 mg by mouth once daily. Patient taking EOD) 90 tablet 3    buPROPion (WELLBUTRIN XL) 150 MG TB24 tablet Take 1 tablet (150 mg total) by mouth once daily. 90 tablet 0    CARBOplatin (PARAPLATIN) 10 mg/mL injection       cetirizine (ZYRTEC) 10 MG tablet Take 10 mg by mouth every evening.       CINVANTI 7.2 mg/mL Emul injection       dexAMETHasone (DECADRON) 4 mg/mL injection       diphenhydrAMINE (BENADRYL) 50 mg/mL injection       losartan (COZAAR) 100 MG tablet Take 1 tablet (100 mg total)  by mouth once daily. 90 tablet 2    metFORMIN (GLUCOPHAGE) 500 MG tablet Take 2 tablets (1,000 mg total) by mouth 2 (two) times daily with meals. 360 tablet 3    metoprolol succinate (TOPROL-XL) 100 MG 24 hr tablet Take 1 tablet (100 mg total) by mouth once daily. 90 tablet 3    multivitamin (THERAGRAN) per tablet Take 1 tablet by mouth once daily.      mupirocin (BACTROBAN) 2 % ointment Apply topically 3 (three) times daily. 30 g 1    OLANZapine (ZYPREXA) 5 MG tablet Take 1 tablet (5 mg total) by mouth every evening. Take 1 tablet by mouth every evening on days 1-4 of each chemotherapy cycle (Patient not taking: Reported on 7/12/2023) 30 tablet 1    ondansetron (ZOFRAN-ODT) 8 MG TbDL Take 1 tablet (8 mg total) by mouth every 8 (eight) hours as needed (nausea/vomiting). Take 1 tablet (8 mg) by mouth every 8 hours as needed for nausea/vomiting. 60 tablet 5    OPDIVO 120 mg/12 mL Soln       OPDIVO 240 mg/24 mL Soln       OPDIVO 40 mg/4 mL injection       palonosetron (ALOXI) 0.25 mg/5 mL injection       pantoprazole (PROTONIX) 40 MG tablet Take 1 tablet (40 mg total) by mouth once daily. 90 tablet 3    predniSONE (DELTASONE) 10 MG tablet Take 1 tablet (10 mg total) by mouth once daily. Take 4 tabs x 3 days, then take 2 tabs x 3 days, then take 1 tab x 3 days. 21 tablet 0    prochlorperazine (COMPAZINE) 10 MG tablet Take 1 tablet (10 mg total) by mouth every 6 (six) hours as needed (nausea/vomiting - second choice). 30 tablet 1    READI-CAT 2 2 % (w/v) suspension       semaglutide (OZEMPIC) 0.25 mg or 0.5 mg(2 mg/1.5 mL) pen injector Inject 0.5 mg into the skin every 7 days. 3 each 3    sertraline (ZOLOFT) 100 MG tablet TAKE 1 TABLET BY MOUTH EVERY EVENING. 90 tablet 3    tiotropium (SPIRIVA WITH HANDIHALER) 18 mcg inhalation capsule INHALE 1 CAPSULE BY MOUTH EVERY DAY. 90 capsule 3    triamcinolone acetonide 0.1% (KENALOG) 0.1 % cream Apply topically 2 (two) times daily. 453.6 g 2    WIXELA INHUB  250-50 mcg/dose diskus inhaler INHALE 1 PUFF INTO THE LUNGS 2 (TWO) TIMES DAILY. CONTROLLER 180 each 3    YERVOY 50 mg/10 mL (5 mg/mL)        Current Facility-Administered Medications on File Prior to Visit   Medication Dose Route Frequency Provider Last Rate Last Admin    fentaNYL injection 25 mcg  25 mcg Intravenous Q5 Min PRN Keesha Martins MD        haloperidol lactate injection 0.5 mg  0.5 mg Intravenous Once PRN Keesha Martins MD        HYDROmorphone injection 0.2 mg  0.2 mg Intravenous Q5 Min PRN Keesha Martins MD        [COMPLETED] ipilimumab (YERVOY) 1 mg/kg = 81 mg in sodium chloride 0.9% 79.2 mL chemo infusion  1 mg/kg (Treatment Plan Recorded) Intravenous 1 time in Clinic/HOD Javi Avina MD   Stopped at 08/01/23 1232    [COMPLETED] nivolumab 360 mg in sodium chloride 0.9% 99 mL infusion  360 mg Intravenous 1 time in Clinic/HOD Javi Avina MD   Stopped at 08/01/23 1203    ondansetron injection 4 mg  4 mg Intravenous Once PRN Keesha Martins MD        [COMPLETED] sodium chloride 0.9% 250 mL flush bag   Intravenous 1 time in Clinic/HOD Javi Avina MD   Stopped at 08/01/23 1232    sodium chloride 0.9% flush 10 mL  10 mL Intravenous PRN Keesha Martins MD        [DISCONTINUED] alteplase injection 2 mg  2 mg Intra-Catheter PRN Javi Avina MD        [DISCONTINUED] diphenhydrAMINE injection 50 mg  50 mg Intravenous Once PRN Javi Avina MD        [DISCONTINUED] EPINEPHrine (EPIPEN) 0.3 mg/0.3 mL pen injection 0.3 mg  0.3 mg Intramuscular Once PRN Javi Avina MD        [DISCONTINUED] heparin, porcine (PF) 100 unit/mL injection flush 500 Units  500 Units Intravenous PRN Javi Avina MD        [DISCONTINUED] hydrocortisone sodium succinate injection 100 mg  100 mg Intravenous Once PRN Javi Avina MD        [DISCONTINUED] sodium chloride 0.9% flush 10 mL  10 mL Intravenous PRN Javi Avina MD   10 mL at 08/01/23 1232       Review of patient's allergies indicates:  "  Allergen Reactions    Taxol [paclitaxel]      Hypersensitivity reaction to taxol, symptoms included shortness of breath, nausea, dizziness, flushing     Carboplatin Other (See Comments)     Itching and hives    Adhesive Rash     tegaderm burns and blistered skin       Vital Signs: /64 (BP Location: Right arm, Patient Position: Sitting)   Pulse 105   Temp 97.8 °F (36.6 °C)   Resp 18   Ht 5' 2" (1.575 m)   Wt 82.6 kg (182 lb)   LMP  (LMP Unknown)   SpO2 (!) 91%   BMI 33.29 kg/m²     ECOG Performance Status: 1    Physical Exam  Vitals reviewed.   Constitutional:       Appearance: Normal appearance.   HENT:      Head: Normocephalic and atraumatic.   Eyes:      General: No scleral icterus.     Extraocular Movements: Extraocular movements intact.   Pulmonary:      Effort: No accessory muscle usage or respiratory distress.   Abdominal:      General: There is no distension.   Musculoskeletal:         General: Normal range of motion.      Cervical back: Normal range of motion and neck supple.   Lymphadenopathy:      Cervical: No cervical adenopathy.   Skin:     General: Skin is warm and dry.   Neurological:      Mental Status: She is alert and oriented to person, place, and time.      Cranial Nerves: No cranial nerve deficit.   Psychiatric:         Mood and Affect: Mood and affect normal.         Judgment: Judgment normal.        Labs:    Imaging: I have personally reviewed the patient's available images and reports and summarized pertinent findings above in HPI.     Pathology: No new path            "

## 2023-08-04 DIAGNOSIS — E11.9 TYPE 2 DIABETES MELLITUS WITHOUT COMPLICATION, WITHOUT LONG-TERM CURRENT USE OF INSULIN: ICD-10-CM

## 2023-08-04 NOTE — TELEPHONE ENCOUNTER
No care due was identified.  [facility] Embedded Care Due Messages. Reference number: [id number].   8/04/2023 11:00:15 AM CDT

## 2023-08-05 NOTE — TELEPHONE ENCOUNTER
Refill Routing Note   Medication(s) are not appropriate for processing by [facility] for the following reason(s):      Patient seen in ED/Hospital since LOV with provider    [facility] action(s):  Defer Care Due:  None identified            Appointments  past 12m or future 3m with PCP    Date Provider   Last Visit   6/1/2023 Emanuel Chavez MD   Next Visit   12/6/2023 Emanuel Chavez MD   ED visits in past 90 days: 2        Note composed:1:34 PM 08/05/2023

## 2023-08-06 ENCOUNTER — PATIENT MESSAGE (OUTPATIENT)
Dept: FAMILY MEDICINE | Facility: CLINIC | Age: 68
End: 2023-08-06
Payer: MEDICARE

## 2023-08-06 ENCOUNTER — CLINICAL SUPPORT (OUTPATIENT)
Dept: CARDIOLOGY | Facility: HOSPITAL | Age: 68
End: 2023-08-06
Payer: MEDICARE

## 2023-08-06 DIAGNOSIS — Z95.810 PRESENCE OF AUTOMATIC (IMPLANTABLE) CARDIAC DEFIBRILLATOR: ICD-10-CM

## 2023-08-06 DIAGNOSIS — Z12.31 BREAST CANCER SCREENING BY MAMMOGRAM: Primary | ICD-10-CM

## 2023-08-06 PROCEDURE — 93296 REM INTERROG EVL PM/IDS: CPT | Mod: HCNC | Performed by: INTERNAL MEDICINE

## 2023-08-07 RX ORDER — METFORMIN HYDROCHLORIDE 500 MG/1
500 TABLET ORAL 2 TIMES DAILY
Qty: 180 TABLET | Refills: 1 | Status: SHIPPED | OUTPATIENT
Start: 2023-08-07 | End: 2023-09-27 | Stop reason: SDUPTHER

## 2023-08-13 ENCOUNTER — NURSE TRIAGE (OUTPATIENT)
Dept: ADMINISTRATIVE | Facility: CLINIC | Age: 68
End: 2023-08-13
Payer: MEDICARE

## 2023-08-13 NOTE — TELEPHONE ENCOUNTER
Pt c/o rash/hives to back area where radiation treatment was given since Friday. Also states that rash is on upper chest area. Pt reports that she took Benadryl and it helped with itching. Advised to speak with provider within 24 hours per protocol. Encounter routed to provider for f/u.       Reason for Disposition   New or worsening widespread rash    Additional Information   Negative: [1] Sudden onset of rash (within last 2 hours) AND [2] difficulty breathing or swallowing   Negative: Shock suspected (e.g., cold/pale/clammy skin, too weak to stand, low BP, rapid pulse)   Negative: Difficult to awaken or acting confused (e.g., disoriented, slurred speech)   Negative: Sounds like a life-threatening emergency to the triager   Negative: Fever > 103 F (39.4 C)   Negative: [1] Bright red, sunburn-like rash AND [2] current tampon use or nasal packing   Negative: [1] Bright red, sunburn-like rash AND [3] wound infection or recent surgery   Negative: [1] Purple or blood-colored rash (spots or dots) AND [2] fever   Negative: [1] Neutropenia known or suspected (e.g., recent cancer chemotherapy) AND [2] fever > 100.4 F (38.0 C)   Negative: [1] Bright red skin AND [2] peels off in sheets   Negative: [1] Widespread rash AND [2] fever > 100.4 F (38.0 C)   Negative: [1] Severe chills (i.e., feeling extremely cold WITH shaking chills) AND [2] fever > 100.4 F (38.0 C)   Negative: Patient sounds very sick or weak to the triager   Negative: [1] Looks infected (e.g., spreading area redness, red streak, or pus) AND [2] fever > 100.4 F (38.0 C)   Negative: [1] Rash is painful to touch AND [2] fever > 100.4 F (38.0 C)   Negative: Large or small blisters on skin (i.e., fluid filled bubbles or sacs)   Negative: Bloody crusts on lips or sores in mouth   Negative: [1] Purple or blood-colored rash (spots or dots) AND [2] no fever AND [3] sounds well to triager   Negative: [1] Caller has URGENT question AND [2] triager unable to answer  "question   Negative: SEVERE rash (new or worsening)   Negative: SEVERE itching (i.e., interferes with sleep, normal activities or school)   Negative: [1] Looks infected (spreading redness, red streak, pus) AND [2] no fever   Negative: Ring-like appearance of rash (or ask: does it look like a  "target" or "bulls- eye")   Negative: Fever > 100.4 F (38.0 C)    Protocols used: Cancer - Skin Symptoms and [id number]    "

## 2023-08-14 ENCOUNTER — E-VISIT (OUTPATIENT)
Dept: FAMILY MEDICINE | Facility: CLINIC | Age: 68
End: 2023-08-14
Payer: MEDICARE

## 2023-08-14 ENCOUNTER — PATIENT MESSAGE (OUTPATIENT)
Dept: FAMILY MEDICINE | Facility: CLINIC | Age: 68
End: 2023-08-14

## 2023-08-14 DIAGNOSIS — R21 SKIN RASH: Primary | ICD-10-CM

## 2023-08-14 PROCEDURE — 99422 PR E&M, ONLINE DIGIT, EST, < 7 DAYS,  11-20 MINS: ICD-10-PCS | Mod: ,,, | Performed by: NURSE PRACTITIONER

## 2023-08-14 PROCEDURE — 99422 OL DIG E/M SVC 11-20 MIN: CPT | Mod: ,,, | Performed by: NURSE PRACTITIONER

## 2023-08-14 NOTE — PROGRESS NOTES
Patient ID: Hannah Montoya is a 67 y.o. female.    Chief Complaint: Rash (Entered automatically based on patient selection in Patient Portal.)    The patient initiated a request through [facility] on 8/14/2023 for evaluation and management with a chief complaint of Rash (Entered automatically based on patient selection in Patient Portal.)     I evaluated the questionnaire submission on 08/14/2023 .    Ohs Peq Evisit Rash    8/14/2023 12:39 PM CDT - Filed by Patient   Do you agree to participate in an E-Visit? Yes   If you have any of the following symptoms, please present to your local ER or call 911:  I acknowledge   What is the main issue that you would like for your doctor to address today? Rash   Are you able to take your vital signs? Yes   Systolic Blood Pressure: 121   Diastolic Blood Pressure: 71   Weight: 177   Height: 62   Pulse: 95   Temperature: 97.8   Respiration rate: 16   Pulse Oxygen: 98   How would you describe your skin problem? Rash   When did your symptoms first appear? 8/11/2023   Where is it located?  Chest;  Back;  Leg(s)   Does it itch? Yes   Does it hurt? No   Is there discharge or drainage? No   Is there bleeding? No   Describe the character Flat   Describe the color Red   Has it changed over time? No change   Frequency of skin problem Fluctuates at random   Duration of the skin problem (how long does it stay when it is present) Weeks   I have had a new exposure to [employer]   What have you used to treat the skin problem? Hydrocortisone cream and benadryl   If you have used anything for treatment, has it helped the symptoms? Maybe   Other generalized symptoms that you associate with the rash No other symptoms   Provide any information you feel is important to your history not asked above Had radiation treatment last month and immunotherapy infusion on Aug 1   At least one photo is required for treatment to be provided. You can upload a maximum of three photos of the affected area.            Recent Labs Obtained:  No visits with results within 7 Day(s) from this visit.   Latest known visit with results is:   Lab Visit on 08/01/2023   Component Date Value Ref Range Status    WBC 08/01/2023 5.83  3.90 - 12.70 K/uL Final    RBC 08/01/2023 4.33  4.00 - 5.40 M/uL Final    Hemoglobin 08/01/2023 11.7 (L)  12.0 - 16.0 g/dL Final    Hematocrit 08/01/2023 37.5  37.0 - 48.5 % Final    MCV 08/01/2023 87  82 - 98 fL Final    MCH 08/01/2023 27.0  27.0 - 31.0 pg Final    MCHC 08/01/2023 31.2 (L)  32.0 - 36.0 g/dL Final    RDW 08/01/2023 17.0 (H)  11.5 - 14.5 % Final    Platelets 08/01/2023 197  150 - 450 K/uL Final    MPV 08/01/2023 11.3  9.2 - 12.9 fL Final    Immature Granulocytes 08/01/2023 0.9 (H)  0.0 - 0.5 % Final    Gran # (ANC) 08/01/2023 4.4  1.8 - 7.7 K/uL Final    Immature Grans (Abs) 08/01/2023 0.05 (H)  0.00 - 0.04 K/uL Final    Comment: Mild elevation in immature granulocytes is non specific and   can be seen in a variety of conditions including stress response,   acute inflammation, trauma and pregnancy. Correlation with other   laboratory and clinical findings is essential.      Lymph # 08/01/2023 0.6 (L)  1.0 - 4.8 K/uL Final    Mono # 08/01/2023 0.6  0.3 - 1.0 K/uL Final    Eos # 08/01/2023 0.1  0.0 - 0.5 K/uL Final    Baso # 08/01/2023 0.05  0.00 - 0.20 K/uL Final    nRBC 08/01/2023 0  0 /100 WBC Final    Gran % 08/01/2023 75.8 (H)  38.0 - 73.0 % Final    Lymph % 08/01/2023 10.6 (L)  18.0 - 48.0 % Final    Mono % 08/01/2023 10.1  4.0 - 15.0 % Final    Eosinophil % 08/01/2023 1.7  0.0 - 8.0 % Final    Basophil % 08/01/2023 0.9  0.0 - 1.9 % Final    Differential Method 08/01/2023 Automated   Final    Sodium 08/01/2023 141  136 - 145 mmol/L Final    Potassium 08/01/2023 4.2  3.5 - 5.1 mmol/L Final    Chloride 08/01/2023 105  95 - 110 mmol/L Final    CO2 08/01/2023 23  23 - 29 mmol/L Final    Glucose 08/01/2023 144 (H)  70 - 110 mg/dL Final    BUN 08/01/2023 19  8 - 23 mg/dL Final    Creatinine  08/01/2023 1.0  0.5 - 1.4 mg/dL Final    Calcium 08/01/2023 10.1  8.7 - 10.5 mg/dL Final    Total Protein 08/01/2023 7.4  6.0 - 8.4 g/dL Final    Albumin 08/01/2023 3.6  3.5 - 5.2 g/dL Final    Total Bilirubin 08/01/2023 0.3  0.1 - 1.0 mg/dL Final    Comment: For infants and newborns, interpretation of results should be based  on gestational age, weight and in agreement with clinical  observations.    Premature Infant recommended reference ranges:  Up to 24 hours.............<8.0 mg/dL  Up to 48 hours............<12.0 mg/dL  3-5 days..................<15.0 mg/dL  6-29 days.................<15.0 mg/dL      Alkaline Phosphatase 08/01/2023 49 (L)  55 - 135 U/L Final    AST 08/01/2023 25  10 - 40 U/L Final    ALT 08/01/2023 20  10 - 44 U/L Final    eGFR 08/01/2023 >60.0  >60 mL/min/1.73 m^2 Final    Anion Gap 08/01/2023 13  8 - 16 mmol/L Final    TSH 08/01/2023 5.131 (H)  0.400 - 4.000 uIU/mL Final    Free T4 08/01/2023 0.86  0.71 - 1.51 ng/dL Final         Encounter Diagnosis   Name Primary?    Skin rash Yes        No orders of the defined types were placed in this encounter.           Follow up in about 2 weeks (around 8/28/2023), or if symptoms worsen or fail to improve.      E-Visit Time Tracking:    Day 1 Time (in minutes): 12     Total Time (in minutes): 12      Problem List Items Addressed This Visit    None  Visit Diagnoses       Skin rash    -  Primary    Reviewed pt entered documentation/media. Called pt at 13:10 08/14/2023 and discussed symptoms. Rash is mild, associated with itching, is not worsening. After speaking with pt she believes rash was triggered by drinking new diet soft drink. Denies concerning symptoms. She is vaccinated against Shingles.     Advised to avoid new soft drink.     Use cerave.     Discussed DDx, condition, and treatment.   Education sent to patient portal/included in after visit summary.  ED precautions given.   Notify provider if symptoms do not resolve or increase in severity.    Patient verbalizes understanding and agrees with plan of care.

## 2023-08-14 NOTE — PATIENT INSTRUCTIONS
Medical Fitness--[phone]  Imaging, Xray, CT, MRI, Ultrasound---[phone]  Bariatrics---[phone]  Breast Surgery---[phone]  Case Management---[phone]  Colonoscopy---[phone]  DME---[phone]  Infectious Disease---[phone]  Interventional Radiology---[phone]  Medical Records---[phone]  [facility]---[phone]  Optometry/Ophthalmology---[phone]  [facility] Bar---[phone]  Physical Therapy---[phone]  Psychiatry---[phone] or [phone]  Plastic Surgery---[phone]  Recovery--[phone] option 2, or [phone].  Sleep Study---[phone]  Smoking Cessation---[phone]  Wound Care---[phone]  Referral Desk---[phone]

## 2023-08-15 ENCOUNTER — PATIENT MESSAGE (OUTPATIENT)
Dept: ADMINISTRATIVE | Facility: OTHER | Age: 68
End: 2023-08-15
Payer: MEDICARE

## 2023-08-15 ENCOUNTER — PATIENT MESSAGE (OUTPATIENT)
Dept: ADMINISTRATIVE | Facility: HOSPITAL | Age: 68
End: 2023-08-15
Payer: MEDICARE

## 2023-08-16 ENCOUNTER — PATIENT MESSAGE (OUTPATIENT)
Dept: HEMATOLOGY/ONCOLOGY | Facility: CLINIC | Age: 68
End: 2023-08-16
Payer: MEDICARE

## 2023-08-16 ENCOUNTER — PATIENT MESSAGE (OUTPATIENT)
Dept: ADMINISTRATIVE | Facility: OTHER | Age: 68
End: 2023-08-16
Payer: MEDICARE

## 2023-08-16 DIAGNOSIS — L27.0 DRUG DERMATITIS: Primary | ICD-10-CM

## 2023-08-17 RX ORDER — BETAMETHASONE DIPROPIONATE 0.5 MG/G
CREAM TOPICAL 2 TIMES DAILY PRN
Qty: 45 G | Refills: 2 | Status: SHIPPED | OUTPATIENT
Start: 2023-08-17

## 2023-08-22 DIAGNOSIS — F32.9 REACTIVE DEPRESSION: ICD-10-CM

## 2023-08-22 NOTE — TELEPHONE ENCOUNTER
No care due was identified.  [facility] Embedded Care Due Messages. Reference number: [id number].   8/22/2023 1:41:33 PM CDT

## 2023-08-23 ENCOUNTER — TELEPHONE (OUTPATIENT)
Dept: HEMATOLOGY/ONCOLOGY | Facility: CLINIC | Age: 68
End: 2023-08-23
Payer: MEDICARE

## 2023-08-23 ENCOUNTER — PATIENT MESSAGE (OUTPATIENT)
Dept: HEMATOLOGY/ONCOLOGY | Facility: CLINIC | Age: 68
End: 2023-08-23
Payer: MEDICARE

## 2023-08-23 RX ORDER — BUPROPION HYDROCHLORIDE 150 MG/1
150 TABLET ORAL
Qty: 90 TABLET | Refills: 0 | Status: SHIPPED | OUTPATIENT
Start: 2023-08-23 | End: 2023-12-19

## 2023-08-24 ENCOUNTER — OFFICE VISIT (OUTPATIENT)
Dept: HEMATOLOGY/ONCOLOGY | Facility: CLINIC | Age: 68
End: 2023-08-24
Payer: MEDICARE

## 2023-08-24 ENCOUNTER — LAB VISIT (OUTPATIENT)
Dept: LAB | Facility: HOSPITAL | Age: 68
End: 2023-08-24
Attending: INTERNAL MEDICINE
Payer: MEDICARE

## 2023-08-24 VITALS
DIASTOLIC BLOOD PRESSURE: 79 MMHG | WEIGHT: 175.69 LBS | RESPIRATION RATE: 18 BRPM | HEART RATE: 118 BPM | SYSTOLIC BLOOD PRESSURE: 136 MMHG | BODY MASS INDEX: 32.33 KG/M2 | TEMPERATURE: 98 F | OXYGEN SATURATION: 93 % | HEIGHT: 62 IN

## 2023-08-24 DIAGNOSIS — I10 ESSENTIAL HYPERTENSION: ICD-10-CM

## 2023-08-24 DIAGNOSIS — E03.9 HYPOTHYROIDISM, UNSPECIFIED TYPE: ICD-10-CM

## 2023-08-24 DIAGNOSIS — Z95.810 CARDIAC RESYNCHRONIZATION THERAPY DEFIBRILLATOR (CRT-D) IN PLACE: ICD-10-CM

## 2023-08-24 DIAGNOSIS — L27.0 DRUG DERMATITIS: ICD-10-CM

## 2023-08-24 DIAGNOSIS — I44.7 LEFT BUNDLE-BRANCH BLOCK: ICD-10-CM

## 2023-08-24 DIAGNOSIS — C34.91 NSCLC OF RIGHT LUNG: Primary | ICD-10-CM

## 2023-08-24 DIAGNOSIS — J70.0 RADIATION PNEUMONITIS: ICD-10-CM

## 2023-08-24 DIAGNOSIS — Z85.3 HISTORY OF BREAST CANCER IN FEMALE: ICD-10-CM

## 2023-08-24 DIAGNOSIS — C34.91 NSCLC OF RIGHT LUNG: ICD-10-CM

## 2023-08-24 DIAGNOSIS — I42.0 DILATED CARDIOMYOPATHY: ICD-10-CM

## 2023-08-24 LAB
ALBUMIN SERPL BCP-MCNC: 3.3 G/DL (ref 3.5–5.2)
ALP SERPL-CCNC: 63 U/L (ref 55–135)
ALT SERPL W/O P-5'-P-CCNC: 16 U/L (ref 10–44)
ANION GAP SERPL CALC-SCNC: 12 MMOL/L (ref 8–16)
AST SERPL-CCNC: 19 U/L (ref 10–40)
BASOPHILS # BLD AUTO: 0.05 K/UL (ref 0–0.2)
BASOPHILS NFR BLD: 0.8 % (ref 0–1.9)
BILIRUB SERPL-MCNC: 0.3 MG/DL (ref 0.1–1)
BUN SERPL-MCNC: 15 MG/DL (ref 8–23)
CALCIUM SERPL-MCNC: 10 MG/DL (ref 8.7–10.5)
CHLORIDE SERPL-SCNC: 106 MMOL/L (ref 95–110)
CO2 SERPL-SCNC: 23 MMOL/L (ref 23–29)
CREAT SERPL-MCNC: 0.9 MG/DL (ref 0.5–1.4)
DIFFERENTIAL METHOD: ABNORMAL
EOSINOPHIL # BLD AUTO: 0.1 K/UL (ref 0–0.5)
EOSINOPHIL NFR BLD: 1.9 % (ref 0–8)
ERYTHROCYTE [DISTWIDTH] IN BLOOD BY AUTOMATED COUNT: 15.5 % (ref 11.5–14.5)
EST. GFR  (NO RACE VARIABLE): >60 ML/MIN/1.73 M^2
GLUCOSE SERPL-MCNC: 119 MG/DL (ref 70–110)
HCT VFR BLD AUTO: 35.7 % (ref 37–48.5)
HGB BLD-MCNC: 11.3 G/DL (ref 12–16)
IMM GRANULOCYTES # BLD AUTO: 0.05 K/UL (ref 0–0.04)
IMM GRANULOCYTES NFR BLD AUTO: 0.8 % (ref 0–0.5)
LYMPHOCYTES # BLD AUTO: 0.6 K/UL (ref 1–4.8)
LYMPHOCYTES NFR BLD: 9.6 % (ref 18–48)
MCH RBC QN AUTO: 27 PG (ref 27–31)
MCHC RBC AUTO-ENTMCNC: 31.7 G/DL (ref 32–36)
MCV RBC AUTO: 85 FL (ref 82–98)
MONOCYTES # BLD AUTO: 0.7 K/UL (ref 0.3–1)
MONOCYTES NFR BLD: 10.8 % (ref 4–15)
NEUTROPHILS # BLD AUTO: 4.8 K/UL (ref 1.8–7.7)
NEUTROPHILS NFR BLD: 76.1 % (ref 38–73)
NRBC BLD-RTO: 0 /100 WBC
PLATELET # BLD AUTO: 232 K/UL (ref 150–450)
PMV BLD AUTO: 11.4 FL (ref 9.2–12.9)
POTASSIUM SERPL-SCNC: 4.5 MMOL/L (ref 3.5–5.1)
PROT SERPL-MCNC: 7.4 G/DL (ref 6–8.4)
RBC # BLD AUTO: 4.18 M/UL (ref 4–5.4)
SODIUM SERPL-SCNC: 141 MMOL/L (ref 136–145)
WBC # BLD AUTO: 6.27 K/UL (ref 3.9–12.7)

## 2023-08-24 PROCEDURE — 99999 PR PBB SHADOW E&M-EST. PATIENT-LVL V: CPT | Mod: PBBFAC,HCNC,, | Performed by: INTERNAL MEDICINE

## 2023-08-24 PROCEDURE — 1126F PR PAIN SEVERITY QUANTIFIED, NO PAIN PRESENT: ICD-10-PCS | Mod: HCNC,CPTII,S$GLB, | Performed by: INTERNAL MEDICINE

## 2023-08-24 PROCEDURE — 80053 COMPREHEN METABOLIC PANEL: CPT | Mod: HCNC | Performed by: INTERNAL MEDICINE

## 2023-08-24 PROCEDURE — 1160F PR REVIEW ALL MEDS BY PRESCRIBER/CLIN PHARMACIST DOCUMENTED: ICD-10-PCS | Mod: HCNC,CPTII,S$GLB, | Performed by: INTERNAL MEDICINE

## 2023-08-24 PROCEDURE — 3075F PR MOST RECENT SYSTOLIC BLOOD PRESS GE 130-139MM HG: ICD-10-PCS | Mod: HCNC,CPTII,S$GLB, | Performed by: INTERNAL MEDICINE

## 2023-08-24 PROCEDURE — 36415 COLL VENOUS BLD VENIPUNCTURE: CPT | Mod: HCNC | Performed by: INTERNAL MEDICINE

## 2023-08-24 PROCEDURE — 3078F DIAST BP <80 MM HG: CPT | Mod: HCNC,CPTII,S$GLB, | Performed by: INTERNAL MEDICINE

## 2023-08-24 PROCEDURE — 3288F PR FALLS RISK ASSESSMENT DOCUMENTED: ICD-10-PCS | Mod: HCNC,CPTII,S$GLB, | Performed by: INTERNAL MEDICINE

## 2023-08-24 PROCEDURE — 3078F PR MOST RECENT DIASTOLIC BLOOD PRESSURE < 80 MM HG: ICD-10-PCS | Mod: HCNC,CPTII,S$GLB, | Performed by: INTERNAL MEDICINE

## 2023-08-24 PROCEDURE — 1101F PT FALLS ASSESS-DOCD LE1/YR: CPT | Mod: HCNC,CPTII,S$GLB, | Performed by: INTERNAL MEDICINE

## 2023-08-24 PROCEDURE — 1157F PR ADVANCE CARE PLAN OR EQUIV PRESENT IN MEDICAL RECORD: ICD-10-PCS | Mod: HCNC,CPTII,S$GLB, | Performed by: INTERNAL MEDICINE

## 2023-08-24 PROCEDURE — 85025 COMPLETE CBC W/AUTO DIFF WBC: CPT | Mod: HCNC | Performed by: INTERNAL MEDICINE

## 2023-08-24 PROCEDURE — 1159F MED LIST DOCD IN RCRD: CPT | Mod: HCNC,CPTII,S$GLB, | Performed by: INTERNAL MEDICINE

## 2023-08-24 PROCEDURE — 3044F PR MOST RECENT HEMOGLOBIN A1C LEVEL <7.0%: ICD-10-PCS | Mod: HCNC,CPTII,S$GLB, | Performed by: INTERNAL MEDICINE

## 2023-08-24 PROCEDURE — 1160F RVW MEDS BY RX/DR IN RCRD: CPT | Mod: HCNC,CPTII,S$GLB, | Performed by: INTERNAL MEDICINE

## 2023-08-24 PROCEDURE — 3008F BODY MASS INDEX DOCD: CPT | Mod: HCNC,CPTII,S$GLB, | Performed by: INTERNAL MEDICINE

## 2023-08-24 PROCEDURE — 1157F ADVNC CARE PLAN IN RCRD: CPT | Mod: HCNC,CPTII,S$GLB, | Performed by: INTERNAL MEDICINE

## 2023-08-24 PROCEDURE — 99215 OFFICE O/P EST HI 40 MIN: CPT | Mod: HCNC,S$GLB,, | Performed by: INTERNAL MEDICINE

## 2023-08-24 PROCEDURE — 1159F PR MEDICATION LIST DOCUMENTED IN MEDICAL RECORD: ICD-10-PCS | Mod: HCNC,CPTII,S$GLB, | Performed by: INTERNAL MEDICINE

## 2023-08-24 PROCEDURE — 99215 PR OFFICE/OUTPT VISIT, EST, LEVL V, 40-54 MIN: ICD-10-PCS | Mod: HCNC,S$GLB,, | Performed by: INTERNAL MEDICINE

## 2023-08-24 PROCEDURE — 99999 PR PBB SHADOW E&M-EST. PATIENT-LVL V: ICD-10-PCS | Mod: PBBFAC,HCNC,, | Performed by: INTERNAL MEDICINE

## 2023-08-24 PROCEDURE — 4010F ACE/ARB THERAPY RXD/TAKEN: CPT | Mod: HCNC,CPTII,S$GLB, | Performed by: INTERNAL MEDICINE

## 2023-08-24 PROCEDURE — 3075F SYST BP GE 130 - 139MM HG: CPT | Mod: HCNC,CPTII,S$GLB, | Performed by: INTERNAL MEDICINE

## 2023-08-24 PROCEDURE — 3008F PR BODY MASS INDEX (BMI) DOCUMENTED: ICD-10-PCS | Mod: HCNC,CPTII,S$GLB, | Performed by: INTERNAL MEDICINE

## 2023-08-24 PROCEDURE — 1101F PR PT FALLS ASSESS DOC 0-1 FALLS W/OUT INJ PAST YR: ICD-10-PCS | Mod: HCNC,CPTII,S$GLB, | Performed by: INTERNAL MEDICINE

## 2023-08-24 PROCEDURE — 3288F FALL RISK ASSESSMENT DOCD: CPT | Mod: HCNC,CPTII,S$GLB, | Performed by: INTERNAL MEDICINE

## 2023-08-24 PROCEDURE — 3044F HG A1C LEVEL LT 7.0%: CPT | Mod: HCNC,CPTII,S$GLB, | Performed by: INTERNAL MEDICINE

## 2023-08-24 PROCEDURE — 1126F AMNT PAIN NOTED NONE PRSNT: CPT | Mod: HCNC,CPTII,S$GLB, | Performed by: INTERNAL MEDICINE

## 2023-08-24 PROCEDURE — 4010F PR ACE/ARB THEARPY RXD/TAKEN: ICD-10-PCS | Mod: HCNC,CPTII,S$GLB, | Performed by: INTERNAL MEDICINE

## 2023-08-24 RX ORDER — PREDNISONE 20 MG/1
TABLET ORAL
Qty: 20 TABLET | Refills: 0 | Status: SHIPPED | OUTPATIENT
Start: 2023-08-24 | End: 2023-09-09

## 2023-08-24 NOTE — PROGRESS NOTES
ONCOLOGY FOLLOW UP VISIT.     Reason for visit: Toxicity visit on 9LA for recurrent stage III NSCLC    Cancer/Stage/TNM:    Cancer Staging   NSCLC of right lung  Staging form: Lung, AJCC 8th Edition  - Clinical stage from 9/29/2020: Stage IIIA (cT3, cN1, cM0) - Signed by Ramo Tucker MD on 10/24/2020         Oncology History   NSCLC of right lung   9/29/2020 Cancer Staged    Staging form: Lung, AJCC 8th Edition  - Clinical stage from 9/29/2020: Stage IIIA (cT3, cN1, cM0)     10/14/2020 Initial Diagnosis    NSCLC of right lung     11/17/2020 - 12/30/2020 Radiation Therapy    Treating physician: Harvinder Lara     Site  Technique  Energy  Dose/Fx (Gy)  #Fx  Total Dose (Gy)    RLL Lung  VMAT  6X  2  30 / 30  60       2/6/2023 -  Chemotherapy    Treatment Summary   Plan Name: OP NSCLC NIVOLUMAB 360 MG D1 & D22 WITH IPILIMUMAB 1 MG/KG Q6W PLUS CARBOPLATIN (AUC) PACLITAXEL Q3W X2 DOSES  Treatment Goal: Control  Status: Active  Start Date: 2/6/2023  End Date: 2/10/2025 (Planned)  Provider: Javi Avina MD  Chemotherapy: CARBOplatin (PARAPLATIN) 525 mg in sodium chloride 0.9% 337.5 mL chemo infusion, 525 mg, Intravenous, Clinic/HOD 1 time, 1 of 1 cycle  Administration: 525 mg (2/6/2023), 490 mg (2/27/2023)  PACLitaxeL (TAXOL) 200 mg/m2 = 396 mg in sodium chloride 0.9% 500 mL chemo infusion, 200 mg/m2 = 396 mg (100 % of original dose 200 mg/m2), Intravenous, Clinic/HOD 1 time, 1 of 1 cycle  Dose modification: 200 mg/m2 (original dose 200 mg/m2, Cycle 1), 200 mg/m2 (original dose 200 mg/m2, Cycle 1)  Administration: 396 mg (2/6/2023), 396 mg (2/27/2023)     6/12/2023 - 6/30/2023 Radiation Therapy    Treating physician: Harvinder Lara    Site Technique Energy Dose/Fx (Gy) #Fx Total Dose (Gy)   RLL Lung RtLung 6X 4 15 / 15 60           Interval History:   Today, patient reports feeling well overall. Has persistent rash to back, chest, and thighs. States it is no longer itchy. Has not gone away with topical  steroid cream. Daughter notes that she is wheezing.     ROS:  Review of Systems   Constitutional:  Negative for chills, fever and weight loss.   HENT:  Negative for hearing loss, nosebleeds and sore throat.    Eyes:  Negative for blurred vision and double vision.   Respiratory:  Positive for wheezing. Negative for cough, hemoptysis and shortness of breath.    Cardiovascular:  Negative for chest pain and leg swelling.   Gastrointestinal:  Negative for abdominal pain, blood in stool, diarrhea, heartburn, nausea and vomiting.   Genitourinary:  Negative for dysuria, frequency and hematuria.   Musculoskeletal:  Negative for back pain, joint pain and myalgias.   Skin:  Positive for rash. Negative for itching.   Neurological:  Negative for dizziness, tingling, sensory change, speech change and headaches.   Endo/Heme/Allergies:  Does not bruise/bleed easily.   Psychiatric/Behavioral:  The patient is not nervous/anxious.           A complete 12-point review of systems was reviewed and is negative except as mentioned above.     Past Medical History:   Past Medical History:   Diagnosis Date    AICD (automatic cardioverter/defibrillator) present     Asthma     bronchitis in past    Breast cancer 2016    right    Cardiac pacemaker     Cardiomyopathy     COPD (chronic obstructive pulmonary disease)     Diabetes mellitus, type 2     Hyperglycemia     Hyperlipidemia     Hypertension     Malignant neoplasm of overlapping sites of female breast 2/12/2016    Nuclear sclerosis of both eyes 8/12/2020    Respiratory distress 3/12/2020        Allergies:   Review of patient's allergies indicates:   Allergen Reactions    Taxol [paclitaxel]      Hypersensitivity reaction to taxol, symptoms included shortness of breath, nausea, dizziness, flushing     Carboplatin Other (See Comments)     Itching and hives    Adhesive Rash     tegaderm burns and blistered skin        Medications:   Current Outpatient Medications   Medication Sig Dispense  Refill    ACCU-CHEK ALFRED PLUS TEST STRP Strp USE TO TEST THREE TIMES DAILY 200 strip 3    albuterol sulfate (PROAIR RESPICLICK) 90 mcg/actuation AePB Inhale 2 puffs into the lungs every 4 (four) hours as needed. Rescue 1 each 5    amLODIPine (NORVASC) 5 MG tablet TAKE 1 TABLET BY MOUTH EVERY DAY (Patient taking differently: Take 5 mg by mouth 2 (two) times daily.) 90 tablet 3    atorvastatin (LIPITOR) 10 MG tablet Take 1 tablet (10 mg total) by mouth once daily. (Patient taking differently: Take 10 mg by mouth once daily. Patient taking EOD) 90 tablet 3    betamethasone dipropionate 0.05 % cream Apply topically 2 (two) times daily as needed (rash). 45 g 2    buPROPion (WELLBUTRIN XL) 150 MG TB24 tablet TAKE 1 TABLET BY MOUTH EVERY DAY 90 tablet 0    CARBOplatin (PARAPLATIN) 10 mg/mL injection       cetirizine (ZYRTEC) 10 MG tablet Take 10 mg by mouth every evening.       CINVANTI 7.2 mg/mL Emul injection       dexAMETHasone (DECADRON) 4 mg/mL injection       diphenhydrAMINE (BENADRYL) 50 mg/mL injection       losartan (COZAAR) 100 MG tablet Take 1 tablet (100 mg total) by mouth once daily. 90 tablet 2    metFORMIN (GLUCOPHAGE) 500 MG tablet TAKE 1 TABLET BY MOUTH TWICE A  tablet 1    metoprolol succinate (TOPROL-XL) 100 MG 24 hr tablet Take 1 tablet (100 mg total) by mouth once daily. 90 tablet 3    multivitamin (THERAGRAN) per tablet Take 1 tablet by mouth once daily.      mupirocin (BACTROBAN) 2 % ointment Apply topically 3 (three) times daily. 30 g 1    ondansetron (ZOFRAN-ODT) 8 MG TbDL Take 1 tablet (8 mg total) by mouth every 8 (eight) hours as needed (nausea/vomiting). Take 1 tablet (8 mg) by mouth every 8 hours as needed for nausea/vomiting. 60 tablet 5    OPDIVO 120 mg/12 mL Soln       OPDIVO 240 mg/24 mL Soln       OPDIVO 40 mg/4 mL injection       palonosetron (ALOXI) 0.25 mg/5 mL injection       pantoprazole (PROTONIX) 40 MG tablet Take 1 tablet (40 mg total) by mouth once daily. 90 tablet 3     predniSONE (DELTASONE) 10 MG tablet Take 1 tablet (10 mg total) by mouth once daily. Take 4 tabs x 3 days, then take 2 tabs x 3 days, then take 1 tab x 3 days. 21 tablet 0    prochlorperazine (COMPAZINE) 10 MG tablet Take 1 tablet (10 mg total) by mouth every 6 (six) hours as needed (nausea/vomiting - second choice). 30 tablet 1    READI-CAT 2 2 % (w/v) suspension       semaglutide (OZEMPIC) 0.25 mg or 0.5 mg(2 mg/1.5 mL) pen injector Inject 0.5 mg into the skin every 7 days. 3 each 3    sertraline (ZOLOFT) 100 MG tablet TAKE 1 TABLET BY MOUTH EVERY EVENING. 90 tablet 3    tiotropium (SPIRIVA WITH HANDIHALER) 18 mcg inhalation capsule INHALE 1 CAPSULE BY MOUTH EVERY DAY. 90 capsule 3    triamcinolone acetonide 0.1% (KENALOG) 0.1 % cream Apply topically 2 (two) times daily. 453.6 g 2    WIXELA INHUB 250-50 mcg/dose diskus inhaler INHALE 1 PUFF INTO THE LUNGS 2 (TWO) TIMES DAILY. CONTROLLER 180 each 3    YERVOY 50 mg/10 mL (5 mg/mL)       OLANZapine (ZYPREXA) 5 MG tablet Take 1 tablet (5 mg total) by mouth every evening. Take 1 tablet by mouth every evening on days 1-4 of each chemotherapy cycle (Patient not taking: Reported on 7/12/2023) 30 tablet 1     No current facility-administered medications for this visit.     Facility-Administered Medications Ordered in Other Visits   Medication Dose Route Frequency Provider Last Rate Last Admin    fentaNYL injection 25 mcg  25 mcg Intravenous Q5 Min PRN Keesha Martins MD        haloperidol lactate injection 0.5 mg  0.5 mg Intravenous Once PRN Keesha Martins MD        HYDROmorphone injection 0.2 mg  0.2 mg Intravenous Q5 Min PRN Keesha Martins MD        ondansetron injection 4 mg  4 mg Intravenous Once PRN Keesha Martins MD        sodium chloride 0.9% flush 10 mL  10 mL Intravenous PRN Keesha Martins MD            Physical Exam:   /79 (BP Location: Right arm, Patient Position: Sitting, BP Method: Medium (Automatic))   Pulse (!) 118   Temp 98.3 °F (36.8 °C) (Oral)   Resp 18   Ht 5'  "2" (1.575 m)   Wt 79.7 kg (175 lb 11.3 oz)   LMP  (LMP Unknown)   SpO2 (!) 93%   BMI 32.14 kg/m²      ECOG Performance Status: (foot note - ECOG PS provided by Eastern Cooperative Oncology Group) 0 - Asymptomatic    Physical Exam  Vitals and nursing note reviewed.   Constitutional:       Appearance: Normal appearance. She is well-developed and normal weight.   HENT:      Head: Normocephalic and atraumatic.   Eyes:      Conjunctiva/sclera: Conjunctivae normal.      Pupils: Pupils are equal, round, and reactive to light.   Pulmonary:      Effort: Pulmonary effort is normal. No respiratory distress.   Abdominal:      General: There is no distension.      Palpations: Abdomen is soft.   Musculoskeletal:         General: No swelling. Normal range of motion.      Cervical back: Normal range of motion and neck supple.      Left ankle: Swelling (trace) present.   Lymphadenopathy:      Cervical: No cervical adenopathy.   Skin:     General: Skin is warm and dry.      Findings: Rash (chest, back, and thighs) present. Rash is macular and papular.   Neurological:      General: No focal deficit present.      Mental Status: She is alert and oriented to person, place, and time.   Psychiatric:         Mood and Affect: Mood and affect normal.         Behavior: Behavior normal.                 Labs:   No results found for this or any previous visit (from the past 48 hour(s)).           Imaging:   CT Chest Abdomen Pelvis With Contrast  Narrative: EXAMINATION:  CT CHEST ABDOMEN PELVIS WITH CONTRAST (XPD)    CLINICAL HISTORY:  Non-small cell lung cancer (NSCLC), metastatic, assess treatment response; Malignant neoplasm of unspecified part of right bronchus or lung    TECHNIQUE:  Low dose axial images, sagittal and coronal reformations were obtained from the thoracic inlet to the pubic symphysis following the IV administration of 75 mL of Omnipaque 350    COMPARISON:  04/27/2023    FINDINGS:  Lungs: Large right lower lobe opacity " extending from the hilum to the pleura with mild volume loss, very similar to the prior exam.  The mass measures 6.7 x 4.0 cm at series 6, image 277.    There are additional small scattered ground-glass and ill-defined nodular opacities, improved in the upper lobes but worsened in the left lower lobe (series 604, images 174-219) since the prior exam.  No emphysematous lung changes.  The tracheobronchial tree is clear.    Mediastinum: Heart size enlarged measuring 14.3 cm (series 2, image 74).  No pericardial or pleural effusion.  Thoracic aorta normal caliber.  There is moderate scattered calcific atherosclerosis.  No mediastinal or axillary lymphadenopathy.    Abdomen: No liver masses.  The main portal veins are patent.  Prior cholecystectomy.  Mild prominence of the common bile duct.  No pancreatic ductal dilatation.  Splenic size within normal limits.  Slightly micro nodular thickening of the adrenal glands.  No renal masses or hydronephrosis.  Abdominal aorta tapers normally.  Moderate scattered calcific atherosclerosis.    Pelvis: Bladder is partially decompressed.  Normal sized uterus with probable small uterine fibroids.  Adnexa are unremarkable.  No inguinal or pelvic lymphadenopathy.  No fluid collections.    Bowel: The terminal ileum and appendix are unremarkable.  No dilated loops of bowel.  No mesenteric lymphadenopathy.    Bones: Multilevel lumbar spondylosis and degenerative changes of the hips.  No marrow replacement process.  Impression: Patient history of metastatic non-small cell lung cancer.    Large residual opacity in the right lower lobe, as above, similar to the 04/27/2023 exam.    Additional small scattered ground-glass and ill-defined nodular opacities, overall improved in the upper lobes but worsened in the left lower lobe since the prior exam, possible post radiation changes, pneumonitis, or new areas of neoplastic involvement.  Continued imaging follow-up could be performed to ensure  stability/resolution.    No evidence of metastatic disease to the abdomen pelvis.    Electronically signed by: Ankur Santo MD  Date:    07/31/2023  Time:    11:07            Diagnoses:       1. NSCLC of right lung    2. Radiation pneumonitis    3. History of breast cancer in female    4. Dilated cardiomyopathy    5. Left bundle-branch block    6. Cardiac resynchronization therapy defibrillator (CRT-D) in place    7. Essential hypertension    8. Drug dermatitis        Assessment and Plan:         1. Recurrent NSCLC  Plan is for definitive chemoRT, will need re-staging scans prior.  Reviewed with patient in detail her diagnosis, stage, treatment options, and prognosis (3 year OS 66% vs. 43.5% without durvalumab) with standard of care chemoRT followed by maintenance immunotherapy.  Patient has consented for [id number] clinical trial, a randomized control trial evaluating combination chemoRT + durvalumab followed by maintenance vs. SOC chemoRT -> maintenance.  CT scans 7/2021 with CR.  - patient randomized on [id number] to SOC arm (Imfinzi) - completed 12/2021.    - CT scan 12/2022 with progressing pulmonary nodule in LLL, still with stable disease in RLL consolidation. PET scan also showing enlarging right lower lobe mass with increased hypermetabolic activity concerning for recurrence. Will present her case at the next thoracic tumor board to discuss biopsy and possible treatment options.   - Biopsy of lung mass consistent with SCC. Initiated on 9LA. Initial re-staging scans with stable disease.   - Patient has completed RT 4-5 weeks ago and has no symptoms. She has some mild inflammation on Chest CT, but since asymptomatic and completed RT will re-initiate IO.  - Now s/p 3 cycles of 9LA. With persistent IO induced rash and continued pneumonitis on recent imaging, plan to hold IO and proceed with surveillance as she recently received definitve doses of radiation. Re-staging scans again in October.     2. There was increase  in GGOs on recent CT. Having wheezing and needing albuterol. Will give short course of prednisone.      3. 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      4-7.  Stable, follows with cardiology. AICD placed, but patient now has recovered EF and only takes lasix prn.  NYHA class I.    8. Continue clobetasol. Will hold IO at this time. Start prednisone taper.    Patient was also seen and examined by Dr. Avina. Patient is in agreement with the proposed treatment plan. All questions were answered to the patient's satisfaction. Pt knows to call clinic if anything is needed before the next clinic visit.    Aide Magaña, MSN, APRN, FNP-C  Hematology and Medical Oncology  Nurse Practitioner to Dr. Javi Avina  Nurse Practitioner, [facility]      I have reviewed the notes, assessments, and/or procedures performed by Aide MONTELONGO, as above.  I have personally interviewed and examined the patient at the beside, and rounded with Aide. I concur with her assessment and plan and the documentation of Hannah Montoya.    MDM includes:    - Acute or chronic illness or injury that poses a threat to life or bodily function  - Independent review and explanation of 2 results from unique tests  - Discussion of management and ordering 2 unique tests  - Extensive discussion of treatment and management  - Prescription drug management  - Drug therapy requiring intensive monitoring for toxicity    Javi Avina M.D.  Hematology/Oncology Attending  Director [facility]  [facility]          Route Chart for Scheduling    Med Onc Chart Routing      Follow up with physician Other. RTC in 8 weeks to review imaging   Follow up with DANIEL    Infusion scheduling note    Injection scheduling note    Labs CBC, CMP, free T4 and TSH   Scheduling:  Preferred lab:  Lab interval:     Imaging CT chest abdomen pelvis   in 8 weeks   Pharmacy appointment     Other referrals          Treatment Plan Information   OP NSCLC NIVOLUMAB 360 MG D1 & D22 WITH IPILIMUMAB 1 MG/KG Q6W PLUS CARBOPLATIN (AUC) PACLITAXEL Q3W X2 DOSES   Javi Avina MD   Upcoming Treatment Dates - OP NSCLC NIVOLUMAB 360 MG D1 & D22 WITH IPILIMUMAB 1 MG/KG Q6W PLUS CARBOPLATIN (AUC) PACLITAXEL Q3W X2 DOSES    8/14/2023       Immunotherapy       nivolumab 360 mg in sodium chloride 0.9% 86 mL infusion  9/4/2023       Immunotherapy       nivolumab 360 mg in sodium chloride 0.9% 86 mL infusion       ipilimumab (YERVOY) 1 mg/kg = 81 mg in sodium chloride 0.9% 66.2 mL chemo infusion  9/25/2023       Immunotherapy       nivolumab 360 mg in sodium chloride 0.9% 86 mL infusion  10/16/2023       Immunotherapy       nivolumab 360 mg in sodium chloride 0.9% 86 mL infusion       ipilimumab (YERVOY) 1 mg/kg = 81 mg in sodium chloride 0.9% 66.2 mL chemo infusion

## 2023-09-22 ENCOUNTER — PATIENT MESSAGE (OUTPATIENT)
Dept: HEMATOLOGY/ONCOLOGY | Facility: CLINIC | Age: 68
End: 2023-09-22
Payer: MEDICARE

## 2023-09-22 ENCOUNTER — TELEPHONE (OUTPATIENT)
Dept: HEMATOLOGY/ONCOLOGY | Facility: CLINIC | Age: 68
End: 2023-09-22
Payer: MEDICARE

## 2023-09-22 NOTE — TELEPHONE ENCOUNTER
Pt related to me she woke up this morning with chest tightness. She did not experience any discomfort in her arms or neck region. She stated she used her inhaler and the tightness has since gone away. Pt related her humidifier shut off during the night. She contributes this to the occurrence of chest tightness. Pt instructed to immediately go to the ER should it occur again. An understanding was verbalized.

## 2023-09-27 ENCOUNTER — PATIENT MESSAGE (OUTPATIENT)
Dept: ENDOCRINOLOGY | Facility: CLINIC | Age: 68
End: 2023-09-27

## 2023-09-27 ENCOUNTER — OFFICE VISIT (OUTPATIENT)
Dept: ENDOCRINOLOGY | Facility: CLINIC | Age: 68
End: 2023-09-27
Payer: MEDICARE

## 2023-09-27 DIAGNOSIS — R94.6 ABNORMAL THYROID FUNCTION TEST: ICD-10-CM

## 2023-09-27 DIAGNOSIS — E66.01 CLASS 2 SEVERE OBESITY DUE TO EXCESS CALORIES WITH SERIOUS COMORBIDITY AND BODY MASS INDEX (BMI) OF 36.0 TO 36.9 IN ADULT: ICD-10-CM

## 2023-09-27 DIAGNOSIS — E66.09 CLASS 1 OBESITY DUE TO EXCESS CALORIES WITH SERIOUS COMORBIDITY AND BODY MASS INDEX (BMI) OF 33.0 TO 33.9 IN ADULT: ICD-10-CM

## 2023-09-27 DIAGNOSIS — E11.9 TYPE 2 DIABETES MELLITUS WITHOUT COMPLICATION, WITHOUT LONG-TERM CURRENT USE OF INSULIN: ICD-10-CM

## 2023-09-27 PROCEDURE — 4010F ACE/ARB THERAPY RXD/TAKEN: CPT | Mod: CPTII,95,, | Performed by: INTERNAL MEDICINE

## 2023-09-27 PROCEDURE — 99214 OFFICE O/P EST MOD 30 MIN: CPT | Mod: 95,,, | Performed by: INTERNAL MEDICINE

## 2023-09-27 PROCEDURE — 1157F PR ADVANCE CARE PLAN OR EQUIV PRESENT IN MEDICAL RECORD: ICD-10-PCS | Mod: CPTII,95,, | Performed by: INTERNAL MEDICINE

## 2023-09-27 PROCEDURE — 1159F MED LIST DOCD IN RCRD: CPT | Mod: CPTII,95,, | Performed by: INTERNAL MEDICINE

## 2023-09-27 PROCEDURE — 1160F RVW MEDS BY RX/DR IN RCRD: CPT | Mod: CPTII,95,, | Performed by: INTERNAL MEDICINE

## 2023-09-27 PROCEDURE — 1159F PR MEDICATION LIST DOCUMENTED IN MEDICAL RECORD: ICD-10-PCS | Mod: CPTII,95,, | Performed by: INTERNAL MEDICINE

## 2023-09-27 PROCEDURE — 1157F ADVNC CARE PLAN IN RCRD: CPT | Mod: CPTII,95,, | Performed by: INTERNAL MEDICINE

## 2023-09-27 PROCEDURE — 1160F PR REVIEW ALL MEDS BY PRESCRIBER/CLIN PHARMACIST DOCUMENTED: ICD-10-PCS | Mod: CPTII,95,, | Performed by: INTERNAL MEDICINE

## 2023-09-27 PROCEDURE — 3044F HG A1C LEVEL LT 7.0%: CPT | Mod: CPTII,95,, | Performed by: INTERNAL MEDICINE

## 2023-09-27 PROCEDURE — 4010F PR ACE/ARB THEARPY RXD/TAKEN: ICD-10-PCS | Mod: CPTII,95,, | Performed by: INTERNAL MEDICINE

## 2023-09-27 PROCEDURE — 99214 PR OFFICE/OUTPT VISIT, EST, LEVL IV, 30-39 MIN: ICD-10-PCS | Mod: 95,,, | Performed by: INTERNAL MEDICINE

## 2023-09-27 PROCEDURE — 3044F PR MOST RECENT HEMOGLOBIN A1C LEVEL <7.0%: ICD-10-PCS | Mod: CPTII,95,, | Performed by: INTERNAL MEDICINE

## 2023-09-27 RX ORDER — METFORMIN HYDROCHLORIDE 500 MG/1
500 TABLET ORAL 2 TIMES DAILY WITH MEALS
Qty: 180 TABLET | Refills: 3 | Status: SHIPPED | OUTPATIENT
Start: 2023-09-27

## 2023-09-27 NOTE — ASSESSMENT & PLAN NOTE
TSH of 5   Plan to repeat in October   Consider low dose therapy if TSH >5 - 7   Negative TPO Ab

## 2023-09-27 NOTE — ASSESSMENT & PLAN NOTE
Diagnosed ten years ago   Plan to decrease metformin 500 mg one tab twice a day due to dizziness.   Increased ozempic to 0.5 mg weekly, however good nutrition is really important   If develops nausea, emesis, diarrhea will stop or reduce dose     A1C in 10/16/2023    Previous microalbumin elevated, plan to repeat as A1C has improved from 8.1 --> 5.8%

## 2023-09-27 NOTE — PROGRESS NOTES
Subjective:      Patient ID: Hannah Montoya is a 68 y.o. female.    Chief Complaint:  No chief complaint on file.      History of Present Illness    Ms. Avina is a 68 year old woman who is here for a follow up visit for type 2 DM, NSCLC previuosly treated with nivolumab, ipilimumab, carboplatin and paclitaxel in the past. Has follow up with Dr. Avina next week for restaging and discussion of therapies.     Current DM regimen:  Metformin 500 mg 2 tabs twice a day --> occasional lightheadedness  Ozempic 0.25 mg weekly - denies n/v/d --> this occurred initially, but now has not lost weight  Has lost 25 lbs    Eats very healthy, salads, vegetables and chicken   Likes to stir avitia her own food.     Active and this has improved recently.       Review of Systems  Denies recent illness     Objective:   Physical Exam  There were no vitals filed for this visit.    BP Readings from Last 3 Encounters:   08/24/23 136/79   08/02/23 138/64   08/01/23 132/60     Wt Readings from Last 1 Encounters:   08/24/23 1059 79.7 kg (175 lb 11.3 oz)         There is no height or weight on file to calculate BMI.    Lab Review:   Lab Results   Component Value Date    HGBA1C 5.8 (H) 05/25/2023     Lab Results   Component Value Date    CHOL 98 (L) 05/25/2023    HDL 31 (L) 05/25/2023    LDLCALC 30.6 (L) 05/25/2023    TRIG 182 (H) 05/25/2023    CHOLHDL 31.6 05/25/2023     Lab Results   Component Value Date     08/24/2023    K 4.5 08/24/2023     08/24/2023    CO2 23 08/24/2023     (H) 08/24/2023    BUN 15 08/24/2023    CREATININE 0.9 08/24/2023    CALCIUM 10.0 08/24/2023    PROT 7.4 08/24/2023    ALBUMIN 3.3 (L) 08/24/2023    BILITOT 0.3 08/24/2023    ALKPHOS 63 08/24/2023    AST 19 08/24/2023    ALT 16 08/24/2023    ANIONGAP 12 08/24/2023    ESTGFRAFRICA >60.0 06/30/2022    EGFRNONAA 59.7 (A) 06/30/2022    TSH 5.131 (H) 08/01/2023      Latest Reference Range & Units Most Recent   TSH 0.400 - 4.000 uIU/mL 5.131  (H)  8/1/23 08:15   T4 Total 4.5 - 11.5 ug/dL 9.1  2/3/23 12:01   Free T4 0.71 - 1.51 ng/dL 0.86  8/1/23 08:15   Thyroperoxidase Antibodies <6.0 IU/mL <6.0  3/8/23 11:28   (H): Data is abnormally high     Latest Reference Range & Units 02/03/20 08:31   Creatinine, Urine 15.0 - 325.0 mg/dL 269.0   Urine Microalbumin ug/mL 185.0   MICROALB/CREAT RATIO 0.0 - 30.0 ug/mg 68.8 (H)   (H): Data is abnormally high    Assessment and Plan     Type 2 diabetes mellitus without complication  Diagnosed ten years ago   Plan to decrease metformin 500 mg one tab twice a day due to dizziness.   Increased ozempic to 0.5 mg weekly, however good nutrition is really important   If develops nausea, emesis, diarrhea will stop or reduce dose     A1C in 10/16/2023    Previous microalbumin elevated, plan to repeat as A1C has improved from 8.1 --> 5.8%       Class 1 obesity due to excess calories with serious comorbidity and body mass index (BMI) of 33.0 to 33.9 in adult  Discussed diet   Try to decrease amount of carbs   Continue higher dose ozempic 0.5 mg weekly       Abnormal thyroid function test  TSH of 5   Plan to repeat in October   Consider low dose therapy if TSH >5 - 7   Negative TPO Ab    The patient location is: home  The chief complaint leading to consultation is:     Visit type: audiovisual    Face to Face time with patient: 19 minutes of total time spent on the encounter, which includes face to face time and non-face to face time preparing to see the patient (eg, review of tests), Obtaining and/or reviewing separately obtained history, Documenting clinical information in the electronic or other health record, Independently interpreting results (not separately reported) and communicating results to the patient/family/caregiver, or Care coordination (not separately reported).         Each patient to whom he or she provides medical services by telemedicine is:  (1) informed of the relationship between the physician and patient and  the respective role of any other health care provider with respect to management of the patient; and (2) notified that he or she may decline to receive medical services by telemedicine and may withdraw from such care at any time.    Notes:

## 2023-10-02 ENCOUNTER — HOSPITAL ENCOUNTER (OUTPATIENT)
Dept: RADIOLOGY | Facility: HOSPITAL | Age: 68
Discharge: HOME | End: 2023-10-02
Attending: FAMILY MEDICINE
Payer: MEDICARE

## 2023-10-02 DIAGNOSIS — Z12.31 BREAST CANCER SCREENING BY MAMMOGRAM: ICD-10-CM

## 2023-10-02 PROCEDURE — 77067 MAMMO DIGITAL SCREENING BILAT WITH TOMO: ICD-10-PCS | Mod: 26,,, | Performed by: RADIOLOGY

## 2023-10-02 PROCEDURE — 77063 MAMMO DIGITAL SCREENING BILAT WITH TOMO: ICD-10-PCS | Mod: 26,,, | Performed by: RADIOLOGY

## 2023-10-02 PROCEDURE — 77063 BREAST TOMOSYNTHESIS BI: CPT | Mod: 26,,, | Performed by: RADIOLOGY

## 2023-10-02 PROCEDURE — 77067 SCR MAMMO BI INCL CAD: CPT | Mod: TC

## 2023-10-02 PROCEDURE — 77067 SCR MAMMO BI INCL CAD: CPT | Mod: 26,,, | Performed by: RADIOLOGY

## 2023-10-03 ENCOUNTER — OFFICE VISIT (OUTPATIENT)
Dept: FAMILY MEDICINE | Facility: CLINIC | Age: 68
End: 2023-10-03
Payer: MEDICARE

## 2023-10-03 VITALS
SYSTOLIC BLOOD PRESSURE: 112 MMHG | OXYGEN SATURATION: 93 % | WEIGHT: 179 LBS | BODY MASS INDEX: 32.94 KG/M2 | DIASTOLIC BLOOD PRESSURE: 80 MMHG | HEART RATE: 103 BPM | TEMPERATURE: 98 F | HEIGHT: 62 IN

## 2023-10-03 DIAGNOSIS — Z95.810 CARDIAC RESYNCHRONIZATION THERAPY DEFIBRILLATOR (CRT-D) IN PLACE: ICD-10-CM

## 2023-10-03 DIAGNOSIS — E66.09 CLASS 1 OBESITY DUE TO EXCESS CALORIES WITH SERIOUS COMORBIDITY AND BODY MASS INDEX (BMI) OF 32.0 TO 32.9 IN ADULT: ICD-10-CM

## 2023-10-03 DIAGNOSIS — Z00.00 ENCOUNTER FOR PREVENTIVE HEALTH EXAMINATION: Primary | ICD-10-CM

## 2023-10-03 DIAGNOSIS — F33.1 MODERATE EPISODE OF RECURRENT MAJOR DEPRESSIVE DISORDER: ICD-10-CM

## 2023-10-03 DIAGNOSIS — Z79.899 DRUG-INDUCED IMMUNODEFICIENCY: ICD-10-CM

## 2023-10-03 DIAGNOSIS — Z85.3 HISTORY OF BREAST CANCER IN FEMALE: ICD-10-CM

## 2023-10-03 DIAGNOSIS — C34.91 NSCLC OF RIGHT LUNG: ICD-10-CM

## 2023-10-03 DIAGNOSIS — I10 ESSENTIAL HYPERTENSION: ICD-10-CM

## 2023-10-03 DIAGNOSIS — E11.9 TYPE 2 DIABETES MELLITUS WITHOUT COMPLICATION, WITHOUT LONG-TERM CURRENT USE OF INSULIN: ICD-10-CM

## 2023-10-03 DIAGNOSIS — Z23 NEEDS FLU SHOT: ICD-10-CM

## 2023-10-03 DIAGNOSIS — D84.821 DRUG-INDUCED IMMUNODEFICIENCY: ICD-10-CM

## 2023-10-03 DIAGNOSIS — I42.8 NICM (NONISCHEMIC CARDIOMYOPATHY): ICD-10-CM

## 2023-10-03 PROCEDURE — G0008 FLU VACCINE - QUADRIVALENT - ADJUVANTED: ICD-10-PCS | Mod: HCNC,S$GLB,, | Performed by: NURSE PRACTITIONER

## 2023-10-03 PROCEDURE — 1159F PR MEDICATION LIST DOCUMENTED IN MEDICAL RECORD: ICD-10-PCS | Mod: HCNC,CPTII,S$GLB, | Performed by: NURSE PRACTITIONER

## 2023-10-03 PROCEDURE — 3074F SYST BP LT 130 MM HG: CPT | Mod: HCNC,CPTII,S$GLB, | Performed by: NURSE PRACTITIONER

## 2023-10-03 PROCEDURE — 90694 FLU VACCINE - QUADRIVALENT - ADJUVANTED: ICD-10-PCS | Mod: HCNC,S$GLB,, | Performed by: NURSE PRACTITIONER

## 2023-10-03 PROCEDURE — 3079F PR MOST RECENT DIASTOLIC BLOOD PRESSURE 80-89 MM HG: ICD-10-PCS | Mod: HCNC,CPTII,S$GLB, | Performed by: NURSE PRACTITIONER

## 2023-10-03 PROCEDURE — G0008 ADMIN INFLUENZA VIRUS VAC: HCPCS | Mod: HCNC,S$GLB,, | Performed by: NURSE PRACTITIONER

## 2023-10-03 PROCEDURE — G0439 PR MEDICARE ANNUAL WELLNESS SUBSEQUENT VISIT: ICD-10-PCS | Mod: HCNC,S$GLB,, | Performed by: NURSE PRACTITIONER

## 2023-10-03 PROCEDURE — 99999 PR PBB SHADOW E&M-EST. PATIENT-LVL V: ICD-10-PCS | Mod: PBBFAC,HCNC,, | Performed by: NURSE PRACTITIONER

## 2023-10-03 PROCEDURE — 1160F RVW MEDS BY RX/DR IN RCRD: CPT | Mod: HCNC,CPTII,S$GLB, | Performed by: NURSE PRACTITIONER

## 2023-10-03 PROCEDURE — 90694 VACC AIIV4 NO PRSRV 0.5ML IM: CPT | Mod: HCNC,S$GLB,, | Performed by: NURSE PRACTITIONER

## 2023-10-03 PROCEDURE — 1170F PR FUNCTIONAL STATUS ASSESSED: ICD-10-PCS | Mod: HCNC,CPTII,S$GLB, | Performed by: NURSE PRACTITIONER

## 2023-10-03 PROCEDURE — 1157F PR ADVANCE CARE PLAN OR EQUIV PRESENT IN MEDICAL RECORD: ICD-10-PCS | Mod: HCNC,CPTII,S$GLB, | Performed by: NURSE PRACTITIONER

## 2023-10-03 PROCEDURE — 3079F DIAST BP 80-89 MM HG: CPT | Mod: HCNC,CPTII,S$GLB, | Performed by: NURSE PRACTITIONER

## 2023-10-03 PROCEDURE — 3044F HG A1C LEVEL LT 7.0%: CPT | Mod: HCNC,CPTII,S$GLB, | Performed by: NURSE PRACTITIONER

## 2023-10-03 PROCEDURE — 4010F ACE/ARB THERAPY RXD/TAKEN: CPT | Mod: HCNC,CPTII,S$GLB, | Performed by: NURSE PRACTITIONER

## 2023-10-03 PROCEDURE — 99999 PR PBB SHADOW E&M-EST. PATIENT-LVL V: CPT | Mod: PBBFAC,HCNC,, | Performed by: NURSE PRACTITIONER

## 2023-10-03 PROCEDURE — 1160F PR REVIEW ALL MEDS BY PRESCRIBER/CLIN PHARMACIST DOCUMENTED: ICD-10-PCS | Mod: HCNC,CPTII,S$GLB, | Performed by: NURSE PRACTITIONER

## 2023-10-03 PROCEDURE — 1170F FXNL STATUS ASSESSED: CPT | Mod: HCNC,CPTII,S$GLB, | Performed by: NURSE PRACTITIONER

## 2023-10-03 PROCEDURE — 3044F PR MOST RECENT HEMOGLOBIN A1C LEVEL <7.0%: ICD-10-PCS | Mod: HCNC,CPTII,S$GLB, | Performed by: NURSE PRACTITIONER

## 2023-10-03 PROCEDURE — 1159F MED LIST DOCD IN RCRD: CPT | Mod: HCNC,CPTII,S$GLB, | Performed by: NURSE PRACTITIONER

## 2023-10-03 PROCEDURE — 1157F ADVNC CARE PLAN IN RCRD: CPT | Mod: HCNC,CPTII,S$GLB, | Performed by: NURSE PRACTITIONER

## 2023-10-03 PROCEDURE — 3074F PR MOST RECENT SYSTOLIC BLOOD PRESSURE < 130 MM HG: ICD-10-PCS | Mod: HCNC,CPTII,S$GLB, | Performed by: NURSE PRACTITIONER

## 2023-10-03 PROCEDURE — G0439 PPPS, SUBSEQ VISIT: HCPCS | Mod: HCNC,S$GLB,, | Performed by: NURSE PRACTITIONER

## 2023-10-03 PROCEDURE — 4010F PR ACE/ARB THEARPY RXD/TAKEN: ICD-10-PCS | Mod: HCNC,CPTII,S$GLB, | Performed by: NURSE PRACTITIONER

## 2023-10-03 NOTE — PROGRESS NOTES
"  Hannah Montoya presented for a  Medicare AWV and comprehensive Health Risk Assessment today. The following components were reviewed and updated:    Medical history  Family History  Social history  Allergies and Current Medications  Health Risk Assessment  Health Maintenance  Care Team       ** See Completed Assessments for Annual Wellness Visit within the encounter summary.**       The following assessments were completed:  Living Situation  CAGE  Depression Screening  Timed Get Up and Go  Whisper Test  Cognitive Function Screening  Nutrition Screening  ADL Screening  PAQ Screening          Vitals:    10/03/23 1300   BP: 112/80   BP Location: Left arm   Patient Position: Sitting   BP Method: Medium (Manual)   Pulse: 103   Temp: 98.2 °F (36.8 °C)   TempSrc: Oral   SpO2: (!) 93%   Weight: 81.2 kg (179 lb 0.2 oz)   Height: 5' 2" (1.575 m)     Body mass index is 32.74 kg/m².  Physical Exam  Vitals and nursing note reviewed.   Constitutional:       Appearance: Normal appearance.   Cardiovascular:      Rate and Rhythm: Normal rate.      Pulses: Normal pulses.      Heart sounds: Normal heart sounds.   Pulmonary:      Effort: Pulmonary effort is normal.      Breath sounds: Normal breath sounds.   Musculoskeletal:         General: Normal range of motion.   Neurological:      Mental Status: She is alert and oriented to person, place, and time.   Psychiatric:         Mood and Affect: Mood normal.         Behavior: Behavior normal.             Diagnoses and health risks identified today and associated recommendations/orders:    1. Encounter for preventive health examination  Pt was seen today for an Annual Wellness visit. Healthcare maintenance and screening recommendations were discussed and updated as indicated. Return in one year for AWV.    Review current opioid prescriptions:n/a  Screen for potential Substance Use Disorders:n/a    2. NSCLC of right lung  The current medical regimen is effective;  continue present plan and " medications. Followed by Hem/Onc, Rad Onc, PCP.    3. Drug-induced immunodeficiency  The current medical regimen is effective;  continue present plan and medications.    4. Type 2 diabetes mellitus without complication, without long-term current use of insulin  The current medical regimen is effective;  continue present plan and medications.  Hemoglobin A1C   Date Value Ref Range Status   05/25/2023 5.8 (H) 4.0 - 5.6 % Final             03/08/2023 8.1 (H) 4.0 - 5.6 % Final             06/30/2022 6.5 (H) 4.0 - 5.6 % Final             5. NICM (nonischemic cardiomyopathy)  The current medical regimen is effective;  continue present plan and medications.    6. Moderate episode of recurrent major depressive disorder  The current medical regimen is effective;  continue present plan and medications.    7. Class 1 obesity due to excess calories with serious comorbidity and body mass index (BMI) of 32.0 to 32.9 in adult  The patient is asked to make an attempt to improve diet and exercise patterns to aid in medical management of this problem.    8. Essential hypertension  Stable. The current medical regimen is effective;  continue present plan and medications.    9. Cardiac resynchronization therapy defibrillator (CRT-D) in place  The current medical regimen is effective;  continue present plan and medications.    10. Needs flu shot  Seasonal flu discussed. Understanding verbalized.  - Influenza (FLUAD) - Quadrivalent (Adjuvanted) *Preferred* (65+) (PF)    11. History of breast cancer in female  The current medical regimen is effective;  continue present plan and medications.        Provided Hannah with a 5-10 year written screening schedule and personal prevention plan. Recommendations were developed using the USPSTF age appropriate recommendations. Education, counseling, and referrals were provided as needed. After Visit Summary printed and given to patient which includes a list of additional screenings\tests  needed.    Follow up in about 1 year (around 10/3/2024).    KRIS Richter  I offered to discuss advanced care planning, including how to pick a person who would make decisions for you if you were unable to make them for yourself, called a health care power of , and what kind of decisions you might make such as use of life sustaining treatments such as ventilators and tube feeding when faced with a life limiting illness recorded on a living will that they will need to know. (How you want to be cared for as you near the end of your natural life)     X  Patient has advanced directives written and agrees to provide copies to the institution.

## 2023-10-03 NOTE — PATIENT INSTRUCTIONS
Counseling and Referral of Other Preventative  (Italic type indicates deductible and co-insurance are waived)    Patient Name: Hannah Montoya  Today's Date: 10/3/2023    Health Maintenance       Date Due Completion Date    TETANUS VACCINE Never done ---    Diabetes Urine Screening 02/03/2021 2/3/2020    Foot Exam 08/03/2021 8/3/2020 (Done)    Override on 8/3/2020: Done    Override on 1/21/2019: Done    Override on 12/28/2017: Done    COVID-19 Vaccine (4 - Pfizer risk series) 11/11/2021 9/16/2021    Eye Exam 09/30/2022 9/30/2021    Override on 2/8/2016: Done    Influenza Vaccine (1) 09/01/2023 11/1/2022    Override on 9/18/2017: Done (done at work)    Override on 10/3/2016: Done    Override on 10/12/2015: Done    Hemoglobin A1c 11/25/2023 5/25/2023    Override on 6/3/2015: Done    Lipid Panel 05/25/2024 5/25/2023    Override on 6/2/2015: Done    Low Dose Statin 08/24/2024 8/24/2023    Mammogram 10/02/2024 10/2/2023    Override on 6/2/2015: Done        No orders of the defined types were placed in this encounter.    The following information is provided to all patients.  This information is to help you find resources for any of the problems found today that may be affecting your health:                Living healthy guide: www.[facility].[place].gov      Understanding Diabetes: www.diabetes.org      Eating healthy: www.cdc.gov/healthyweight      CDC home safety checklist: www.cdc.gov/steadi/patient.html      Agency on Aging: www.goea.[place].gov      Alcoholics anonymous (AA): www.aa.org      Physical Activity: www.evan.nih.gov/aw5deor      Tobacco use: www.quitwithusla.org      No

## 2023-10-05 NOTE — PROGRESS NOTES
Health Maintenance Due   Topic Date Due    Cervical Cancer Screening  08/30/1976    Shingles Vaccine (1 of 2) 08/30/2007    Colorectal Cancer Screening  03/29/2020    Influenza Vaccine (1) 08/01/2020     Updates were requested from care everywhere.  Chart was reviewed for overdue Proactive [facility] Encounters ([facility]) topics (CRS, Breast Cancer Screening, Eye exam)  Health Maintenance has been updated.  LINKS immunization registry triggered.  Immunizations were reconciled.     Completed PA via cover my meds We do not manage pharmacy benefits for this plan. Please contact [employer] at [phone] (BIN [id number] and NINON OHRXPROD) or visit their website at [email or web address]. completed PA again with a [employer] form via cover my meds. Faxed via cover my meds.

## 2023-10-16 ENCOUNTER — HOSPITAL ENCOUNTER (OUTPATIENT)
Dept: RADIOLOGY | Facility: HOSPITAL | Age: 68
Discharge: HOME | End: 2023-10-16
Attending: NURSE PRACTITIONER
Payer: MEDICARE

## 2023-10-16 DIAGNOSIS — C34.91 NSCLC OF RIGHT LUNG: ICD-10-CM

## 2023-10-16 PROCEDURE — 71260 CT THORAX DX C+: CPT | Mod: TC,HCNC

## 2023-10-16 PROCEDURE — 74177 CT ABD & PELVIS W/CONTRAST: CPT | Mod: TC,HCNC

## 2023-10-16 PROCEDURE — 25500020 PHARM REV CODE 255: Mod: HCNC | Performed by: NURSE PRACTITIONER

## 2023-10-16 RX ADMIN — IOHEXOL 15 ML: 300 INJECTION, SOLUTION INTRAVENOUS at 10:10

## 2023-10-16 RX ADMIN — IOHEXOL 75 ML: 350 INJECTION, SOLUTION INTRAVENOUS at 10:10

## 2023-10-18 ENCOUNTER — PATIENT MESSAGE (OUTPATIENT)
Dept: HEMATOLOGY/ONCOLOGY | Facility: CLINIC | Age: 68
End: 2023-10-18

## 2023-10-18 ENCOUNTER — OFFICE VISIT (OUTPATIENT)
Dept: HEMATOLOGY/ONCOLOGY | Facility: CLINIC | Age: 68
End: 2023-10-18
Payer: MEDICARE

## 2023-10-18 ENCOUNTER — TELEPHONE (OUTPATIENT)
Dept: HEMATOLOGY/ONCOLOGY | Facility: CLINIC | Age: 68
End: 2023-10-18
Payer: MEDICARE

## 2023-10-18 VITALS
RESPIRATION RATE: 17 BRPM | HEART RATE: 91 BPM | OXYGEN SATURATION: 98 % | BODY MASS INDEX: 32.99 KG/M2 | WEIGHT: 179.25 LBS | SYSTOLIC BLOOD PRESSURE: 149 MMHG | DIASTOLIC BLOOD PRESSURE: 82 MMHG | TEMPERATURE: 98 F | HEIGHT: 62 IN

## 2023-10-18 DIAGNOSIS — U07.1 COVID-19 VIRUS DETECTED: ICD-10-CM

## 2023-10-18 DIAGNOSIS — C34.91 NSCLC OF RIGHT LUNG: Primary | ICD-10-CM

## 2023-10-18 DIAGNOSIS — J70.0 RADIATION PNEUMONITIS: ICD-10-CM

## 2023-10-18 DIAGNOSIS — I10 ESSENTIAL HYPERTENSION: ICD-10-CM

## 2023-10-18 DIAGNOSIS — Z11.52 ENCOUNTER FOR SCREENING FOR COVID-19: ICD-10-CM

## 2023-10-18 DIAGNOSIS — F41.9 ANXIETY: ICD-10-CM

## 2023-10-18 DIAGNOSIS — Z95.810 CARDIAC RESYNCHRONIZATION THERAPY DEFIBRILLATOR (CRT-D) IN PLACE: ICD-10-CM

## 2023-10-18 DIAGNOSIS — I42.0 DILATED CARDIOMYOPATHY: ICD-10-CM

## 2023-10-18 DIAGNOSIS — Z85.3 HISTORY OF BREAST CANCER IN FEMALE: ICD-10-CM

## 2023-10-18 DIAGNOSIS — I44.7 LEFT BUNDLE-BRANCH BLOCK: ICD-10-CM

## 2023-10-18 DIAGNOSIS — L27.0 DRUG DERMATITIS: ICD-10-CM

## 2023-10-18 LAB — SARS-COV-2 RDRP RESP QL NAA+PROBE: POSITIVE

## 2023-10-18 PROCEDURE — U0002 COVID-19 LAB TEST NON-CDC: HCPCS | Mod: HCNC | Performed by: INTERNAL MEDICINE

## 2023-10-18 PROCEDURE — 3044F PR MOST RECENT HEMOGLOBIN A1C LEVEL <7.0%: ICD-10-PCS | Mod: HCNC,CPTII,S$GLB, | Performed by: INTERNAL MEDICINE

## 2023-10-18 PROCEDURE — 3077F PR MOST RECENT SYSTOLIC BLOOD PRESSURE >= 140 MM HG: ICD-10-PCS | Mod: HCNC,CPTII,S$GLB, | Performed by: INTERNAL MEDICINE

## 2023-10-18 PROCEDURE — 3044F HG A1C LEVEL LT 7.0%: CPT | Mod: HCNC,CPTII,S$GLB, | Performed by: INTERNAL MEDICINE

## 2023-10-18 PROCEDURE — 1157F ADVNC CARE PLAN IN RCRD: CPT | Mod: HCNC,CPTII,S$GLB, | Performed by: INTERNAL MEDICINE

## 2023-10-18 PROCEDURE — 3079F PR MOST RECENT DIASTOLIC BLOOD PRESSURE 80-89 MM HG: ICD-10-PCS | Mod: HCNC,CPTII,S$GLB, | Performed by: INTERNAL MEDICINE

## 2023-10-18 PROCEDURE — 3079F DIAST BP 80-89 MM HG: CPT | Mod: HCNC,CPTII,S$GLB, | Performed by: INTERNAL MEDICINE

## 2023-10-18 PROCEDURE — 3066F PR DOCUMENTATION OF TREATMENT FOR NEPHROPATHY: ICD-10-PCS | Mod: HCNC,CPTII,S$GLB, | Performed by: INTERNAL MEDICINE

## 2023-10-18 PROCEDURE — 99999 PR PBB SHADOW E&M-EST. PATIENT-LVL V: CPT | Mod: PBBFAC,HCNC,, | Performed by: INTERNAL MEDICINE

## 2023-10-18 PROCEDURE — 99999 PR PBB SHADOW E&M-EST. PATIENT-LVL V: ICD-10-PCS | Mod: PBBFAC,HCNC,, | Performed by: INTERNAL MEDICINE

## 2023-10-18 PROCEDURE — 1101F PR PT FALLS ASSESS DOC 0-1 FALLS W/OUT INJ PAST YR: ICD-10-PCS | Mod: HCNC,CPTII,S$GLB, | Performed by: INTERNAL MEDICINE

## 2023-10-18 PROCEDURE — 3288F PR FALLS RISK ASSESSMENT DOCUMENTED: ICD-10-PCS | Mod: HCNC,CPTII,S$GLB, | Performed by: INTERNAL MEDICINE

## 2023-10-18 PROCEDURE — 1159F MED LIST DOCD IN RCRD: CPT | Mod: HCNC,CPTII,S$GLB, | Performed by: INTERNAL MEDICINE

## 2023-10-18 PROCEDURE — 1160F PR REVIEW ALL MEDS BY PRESCRIBER/CLIN PHARMACIST DOCUMENTED: ICD-10-PCS | Mod: HCNC,CPTII,S$GLB, | Performed by: INTERNAL MEDICINE

## 2023-10-18 PROCEDURE — 3288F FALL RISK ASSESSMENT DOCD: CPT | Mod: HCNC,CPTII,S$GLB, | Performed by: INTERNAL MEDICINE

## 2023-10-18 PROCEDURE — 3062F POS MACROALBUMINURIA REV: CPT | Mod: HCNC,CPTII,S$GLB, | Performed by: INTERNAL MEDICINE

## 2023-10-18 PROCEDURE — 1160F RVW MEDS BY RX/DR IN RCRD: CPT | Mod: HCNC,CPTII,S$GLB, | Performed by: INTERNAL MEDICINE

## 2023-10-18 PROCEDURE — 1157F PR ADVANCE CARE PLAN OR EQUIV PRESENT IN MEDICAL RECORD: ICD-10-PCS | Mod: HCNC,CPTII,S$GLB, | Performed by: INTERNAL MEDICINE

## 2023-10-18 PROCEDURE — 3062F PR POS MACROALBUMINURIA RESULT DOCUMENTED/REVIEW: ICD-10-PCS | Mod: HCNC,CPTII,S$GLB, | Performed by: INTERNAL MEDICINE

## 2023-10-18 PROCEDURE — 4010F ACE/ARB THERAPY RXD/TAKEN: CPT | Mod: HCNC,CPTII,S$GLB, | Performed by: INTERNAL MEDICINE

## 2023-10-18 PROCEDURE — 1101F PT FALLS ASSESS-DOCD LE1/YR: CPT | Mod: HCNC,CPTII,S$GLB, | Performed by: INTERNAL MEDICINE

## 2023-10-18 PROCEDURE — 3008F PR BODY MASS INDEX (BMI) DOCUMENTED: ICD-10-PCS | Mod: HCNC,CPTII,S$GLB, | Performed by: INTERNAL MEDICINE

## 2023-10-18 PROCEDURE — 3066F NEPHROPATHY DOC TX: CPT | Mod: HCNC,CPTII,S$GLB, | Performed by: INTERNAL MEDICINE

## 2023-10-18 PROCEDURE — 1126F AMNT PAIN NOTED NONE PRSNT: CPT | Mod: HCNC,CPTII,S$GLB, | Performed by: INTERNAL MEDICINE

## 2023-10-18 PROCEDURE — 1126F PR PAIN SEVERITY QUANTIFIED, NO PAIN PRESENT: ICD-10-PCS | Mod: HCNC,CPTII,S$GLB, | Performed by: INTERNAL MEDICINE

## 2023-10-18 PROCEDURE — 99215 PR OFFICE/OUTPT VISIT, EST, LEVL V, 40-54 MIN: ICD-10-PCS | Mod: HCNC,S$GLB,, | Performed by: INTERNAL MEDICINE

## 2023-10-18 PROCEDURE — 1159F PR MEDICATION LIST DOCUMENTED IN MEDICAL RECORD: ICD-10-PCS | Mod: HCNC,CPTII,S$GLB, | Performed by: INTERNAL MEDICINE

## 2023-10-18 PROCEDURE — 4010F PR ACE/ARB THEARPY RXD/TAKEN: ICD-10-PCS | Mod: HCNC,CPTII,S$GLB, | Performed by: INTERNAL MEDICINE

## 2023-10-18 PROCEDURE — 3077F SYST BP >= 140 MM HG: CPT | Mod: HCNC,CPTII,S$GLB, | Performed by: INTERNAL MEDICINE

## 2023-10-18 PROCEDURE — 99215 OFFICE O/P EST HI 40 MIN: CPT | Mod: HCNC,S$GLB,, | Performed by: INTERNAL MEDICINE

## 2023-10-18 PROCEDURE — 3008F BODY MASS INDEX DOCD: CPT | Mod: HCNC,CPTII,S$GLB, | Performed by: INTERNAL MEDICINE

## 2023-10-18 NOTE — PROGRESS NOTES
ONCOLOGY FOLLOW UP VISIT.     Reason for visit: Toxicity visit on 9LA for recurrent stage III NSCLC    Cancer/Stage/TNM:    Cancer Staging   NSCLC of right lung  Staging form: Lung, AJCC 8th Edition  - Clinical stage from 9/29/2020: Stage IIIA (cT3, cN1, cM0) - Signed by Ramo Tucker MD on 10/24/2020         Oncology History   NSCLC of right lung   9/29/2020 Cancer Staged    Staging form: Lung, AJCC 8th Edition  - Clinical stage from 9/29/2020: Stage IIIA (cT3, cN1, cM0)     10/14/2020 Initial Diagnosis    NSCLC of right lung     11/17/2020 - 12/30/2020 Radiation Therapy    Treating physician: Harvinder Lara     Site  Technique  Energy  Dose/Fx (Gy)  #Fx  Total Dose (Gy)    RLL Lung  VMAT  6X  2  30 / 30  60       2/6/2023 -  Chemotherapy    Treatment Summary   Plan Name: OP NSCLC NIVOLUMAB 360 MG D1 & D22 WITH IPILIMUMAB 1 MG/KG Q6W PLUS CARBOPLATIN (AUC) PACLITAXEL Q3W X2 DOSES  Treatment Goal: Control  Status: Active  Start Date: 2/6/2023  End Date: 2/10/2025 (Planned)  Provider: Javi Avina MD  Chemotherapy: CARBOplatin (PARAPLATIN) 525 mg in sodium chloride 0.9% 337.5 mL chemo infusion, 525 mg, Intravenous, Clinic/HOD 1 time, 1 of 1 cycle  Administration: 525 mg (2/6/2023), 490 mg (2/27/2023)  PACLitaxeL (TAXOL) 200 mg/m2 = 396 mg in sodium chloride 0.9% 500 mL chemo infusion, 200 mg/m2 = 396 mg (100 % of original dose 200 mg/m2), Intravenous, Clinic/HOD 1 time, 1 of 1 cycle  Dose modification: 200 mg/m2 (original dose 200 mg/m2, Cycle 1), 200 mg/m2 (original dose 200 mg/m2, Cycle 1)  Administration: 396 mg (2/6/2023), 396 mg (2/27/2023)     6/12/2023 - 6/30/2023 Radiation Therapy    Treating physician: Harvinder Lara    Site Technique Energy Dose/Fx (Gy) #Fx Total Dose (Gy)   RLL Lung RtLung 6X 4 15 / 15 60           Interval History:   Today, patient reports feeling anxious. Reports cold-like symptoms. Notes a wet cough over the last couple days. Has scratchy throat and runny nose. Symptomatic  fever with headache last night. States previous steroid course was helpful for her breathing. Verbalizes worsening anxiety especially surrounding imaging.    ROS:  Review of Systems   Constitutional:  Negative for chills, fever and weight loss.   HENT:  Negative for hearing loss, nosebleeds and sore throat.    Eyes:  Negative for blurred vision and double vision.   Respiratory:  Positive for cough (wet) and wheezing. Negative for hemoptysis and shortness of breath.    Cardiovascular:  Negative for chest pain and leg swelling.   Gastrointestinal:  Negative for abdominal pain, blood in stool, diarrhea, heartburn, nausea and vomiting.   Genitourinary:  Negative for dysuria, frequency and hematuria.   Musculoskeletal:  Negative for back pain, joint pain and myalgias.   Skin:  Positive for rash. Negative for itching.   Neurological:  Negative for dizziness, tingling, sensory change, speech change and headaches.   Endo/Heme/Allergies:  Does not bruise/bleed easily.   Psychiatric/Behavioral:  The patient is not nervous/anxious.           A complete 12-point review of systems was reviewed and is negative except as mentioned above.     Past Medical History:   Past Medical History:   Diagnosis Date    AICD (automatic cardioverter/defibrillator) present     Asthma     bronchitis in past    Breast cancer 2016    right    Cardiac pacemaker     Cardiomyopathy     COPD (chronic obstructive pulmonary disease)     Diabetes mellitus, type 2     Hyperglycemia     Hyperlipidemia     Hypertension     Malignant neoplasm of overlapping sites of female breast 2/12/2016    Nuclear sclerosis of both eyes 8/12/2020    Respiratory distress 3/12/2020        Allergies:   Review of patient's allergies indicates:   Allergen Reactions    Taxol [paclitaxel]      Hypersensitivity reaction to taxol, symptoms included shortness of breath, nausea, dizziness, flushing     Carboplatin Other (See Comments)     Itching and hives    Adhesive Rash      tegaderm burns and blistered skin        Medications:   Current Outpatient Medications   Medication Sig Dispense Refill    ACCU-CHEK ALFRED PLUS TEST STRP Strp USE TO TEST THREE TIMES DAILY 200 strip 3    albuterol sulfate (PROAIR RESPICLICK) 90 mcg/actuation AePB Inhale 2 puffs into the lungs every 4 (four) hours as needed. Rescue 1 each 5    amLODIPine (NORVASC) 5 MG tablet TAKE 1 TABLET BY MOUTH EVERY DAY (Patient taking differently: Take 5 mg by mouth 2 (two) times daily.) 90 tablet 3    atorvastatin (LIPITOR) 10 MG tablet Take 1 tablet (10 mg total) by mouth once daily. (Patient taking differently: Take 10 mg by mouth once daily. Patient taking EOD) 90 tablet 3    betamethasone dipropionate 0.05 % cream Apply topically 2 (two) times daily as needed (rash). 45 g 2    buPROPion (WELLBUTRIN XL) 150 MG TB24 tablet TAKE 1 TABLET BY MOUTH EVERY DAY 90 tablet 0    cetirizine (ZYRTEC) 10 MG tablet Take 10 mg by mouth every evening.       losartan (COZAAR) 100 MG tablet Take 1 tablet (100 mg total) by mouth once daily. 90 tablet 2    metFORMIN (GLUCOPHAGE) 500 MG tablet Take 1 tablet (500 mg total) by mouth 2 (two) times daily with meals. 180 tablet 3    metoprolol succinate (TOPROL-XL) 100 MG 24 hr tablet Take 1 tablet (100 mg total) by mouth once daily. 90 tablet 3    multivitamin (THERAGRAN) per tablet Take 1 tablet by mouth once daily.      mupirocin (BACTROBAN) 2 % ointment Apply topically 3 (three) times daily. 30 g 1    ondansetron (ZOFRAN-ODT) 8 MG TbDL Take 1 tablet (8 mg total) by mouth every 8 (eight) hours as needed (nausea/vomiting). Take 1 tablet (8 mg) by mouth every 8 hours as needed for nausea/vomiting. 60 tablet 5    OPDIVO 120 mg/12 mL Soln       OPDIVO 240 mg/24 mL Soln       OPDIVO 40 mg/4 mL injection       palonosetron (ALOXI) 0.25 mg/5 mL injection       pantoprazole (PROTONIX) 40 MG tablet Take 1 tablet (40 mg total) by mouth once daily. 90 tablet 3    prochlorperazine (COMPAZINE) 10 MG tablet  "Take 1 tablet (10 mg total) by mouth every 6 (six) hours as needed (nausea/vomiting - second choice). 30 tablet 1    READI-CAT 2 2 % (w/v) suspension       semaglutide (OZEMPIC) 0.25 mg or 0.5 mg(2 mg/1.5 mL) pen injector Inject 0.5 mg into the skin every 7 days. 1.5 mL 6    sertraline (ZOLOFT) 100 MG tablet TAKE 1 TABLET BY MOUTH EVERY EVENING. 90 tablet 3    tiotropium (SPIRIVA WITH HANDIHALER) 18 mcg inhalation capsule INHALE 1 CAPSULE BY MOUTH EVERY DAY. 90 capsule 3    triamcinolone acetonide 0.1% (KENALOG) 0.1 % cream Apply topically 2 (two) times daily. 453.6 g 2    WIXELA INHUB 250-50 mcg/dose diskus inhaler INHALE 1 PUFF INTO THE LUNGS 2 (TWO) TIMES DAILY. CONTROLLER 180 each 3    YERVOY 50 mg/10 mL (5 mg/mL)        No current facility-administered medications for this visit.     Facility-Administered Medications Ordered in Other Visits   Medication Dose Route Frequency Provider Last Rate Last Admin    fentaNYL injection 25 mcg  25 mcg Intravenous Q5 Min PRKeesha Mirza MD        haloperidol lactate injection 0.5 mg  0.5 mg Intravenous Once PRKeesha Mirza MD        HYDROmorphone injection 0.2 mg  0.2 mg Intravenous Q5 Min PRKeesha Mirza MD        ondansetron injection 4 mg  4 mg Intravenous Once PRN Keesha Martins MD        sodium chloride 0.9% flush 10 mL  10 mL Intravenous PRN Keesha Martins MD            Physical Exam:   BP (!) 149/82 (BP Location: Left arm, Patient Position: Sitting, BP Method: Medium (Automatic))   Pulse 91   Temp 98.2 °F (36.8 °C) (Oral)   Resp 17   Ht 5' 2" (1.575 m)   Wt 81.3 kg (179 lb 3.7 oz)   LMP  (LMP Unknown)   SpO2 98%   BMI 32.78 kg/m²      ECOG Performance Status: (foot note - ECOG PS provided by Eastern Cooperative Oncology Group) 0 - Asymptomatic    Physical Exam  Vitals and nursing note reviewed.   Constitutional:       Appearance: Normal appearance. She is well-developed and normal weight.   HENT:      Head: Normocephalic and atraumatic.   Eyes:      " Conjunctiva/sclera: Conjunctivae normal.      Pupils: Pupils are equal, round, and reactive to light.   Pulmonary:      Effort: Pulmonary effort is normal. No respiratory distress.   Abdominal:      General: There is no distension.      Palpations: Abdomen is soft.   Musculoskeletal:         General: No swelling. Normal range of motion.      Cervical back: Normal range of motion and neck supple.      Left ankle: Swelling (trace) present.   Lymphadenopathy:      Cervical: No cervical adenopathy.   Skin:     General: Skin is warm and dry.      Findings: Rash (chest, back, and thighs) present. Rash is macular and papular.   Neurological:      General: No focal deficit present.      Mental Status: She is alert and oriented to person, place, and time.   Psychiatric:         Mood and Affect: Mood and affect normal.         Behavior: Behavior normal.                 Labs:   Recent Results (from the past 48 hour(s))   COVID-19 Rapid Screening    Collection Time: 10/18/23 10:32 AM   Result Value Ref Range    SARS-CoV-2 RNA, Amplification, Qual Positive (A) Negative              Imaging:   CT Chest Abdomen Pelvis With Contrast  Narrative: EXAMINATION:  CT Chest, abdomen pelvis With Contrast; Diagnostic    CLINICAL HISTORY:  Prior oncological treatment - reassess treatment response. Prior oncological treatment - unknown what prior treatment. Condition or disease; Other: Breast; Lung condition and disease; Cancer of the lung; Right; Unspecified    TECHNIQUE:  maging protocol: Diagnostic computed tomography of the chest, abdomen pelvis with contrast. Radiation optimization: All CT scans at this facility use at least one of these dose optimization techniques: automated exposure control; mA and/or kV adjustment per patient size (includes targeted exams where dose is matched to clinical indication); or iterative reconstruction. Contrast material: OMNI 350; Contrast volume: 15 ml; Contrast route: INTRAVENOUS  (IV);    COMPARISON:  PET-CT 01/12/2023, CT chest, abdomen pelvis 09/27/2022 and 07/01/2022, PET-CT 09/14/2021    FINDINGS:  Chest: Tubes, catheters and devices: Pacemaker is in the left upper chest wall with electrodes in the right atrium and right ventricle, and additional electrodes extending in the left lower anterior mediastinum terminating adjacent to the upper anterior wall of the left ventricle. Follow-up is recommended to monitor those findings. Lungs: There is significant partial collapse of the right lower lobe with heterogenous enhancement in the collapsed portion of the lobe progressive since prior exam. Primary malignant mass documented on 01/12/2023 is not clearly identified within the collapsed portion of the lobe. Patchy ground-glass opacities scattered in both lungs particular prominent in the left upper lobe and the superior segment of the right lower lobe are indeterminate possibly representing pneumonia. Small irregular shaped left lower lobe nodules measuring up to 1 cm (series 6, image 377 and 363) are indeterminate, possibly representing part of the same pneumonia though lung metastases are not externally excluded. Pleural spaces: Mild right pleural effusion is new since prior exam. No pneumothorax. Heart: No cardiomegaly. No pericardial effusion. Lymph nodes: Borderline in size right paratracheal and subcarinal lymph nodes are stable since prior exam and nonspecific. No hilar, supraclavicular or axillary lymphadenopathy. Vasculature: Unremarkable. No aortic aneurysm. Bones/joints: No acute fracture. No suspicious lytic or sclerotic bone lesions. Soft tissues: Unremarkable. Abdomen and pelvis: Liver: The liver is enlarged or elongated (18.5 cm in the craniocaudal span) with no mass. Gallbladder and bile ducts: Status post cholecystectomy. There is stable common bile duct dilatation with no dilatation of intrahepatic bile ducts suggestive of nonobstructive finding post cholecystectomy.  Pancreas: Normal. No ductal dilation. Spleen: Normal in size. No mass. Adrenal glands: Normal. No mass. Kidneys and ureters: The kidneys are normal in size with preserved parenchyma. No mass. No hydronephrosis or hydroureter. Stomach and bowel: No obstruction. No wall thickening. Appendix: Normal appendix is visualized. Intraperitoneal space: No free intraperitoneal fluid or air. Vasculature: No abdominal aortic aneurysm. Lymph nodes: No enlarged lymph nodes. Urinary bladder: Unremarkable as visualized. Reproductive: The uterus is normal in size with stable lobulated contours on the account of partially calcified small nodules suggestive of fibroids. Bones/joints: No acute fracture. No dislocation. No suspicious lytic or sclerotic bone lesions. Soft tissues: Unremarkable.  Impression: 1. The primary malignant mass in the right lower lobe is not well appreciated on the background of significant partial collapse of the right lower lobe new/progressive since prior exam. There is new mild right pleural effusion. 2. New bilateral patchy ground-glass and part solid opacities may represent pneumonia. Nodular solid opacities in the left lower lobe are indeterminate, may represent part of the same pneumonia though lung metastases are not entirely excluded. Follow-up in 3 months is recommended to monitor. 3. Stable exam of the abdomen pelvis with no evidence of metastatic disease    Electronically signed by: Virtual Radiologist  Date:    10/18/2023  Time:    09:58            Diagnoses:       1. NSCLC of right lung    2. Encounter for screening for COVID-19    3. Radiation pneumonitis    4. History of breast cancer in female    5. Dilated cardiomyopathy    6. Left bundle-branch block    7. Cardiac resynchronization therapy defibrillator (CRT-D) in place    8. Essential hypertension    9. Drug dermatitis    10. Anxiety          Assessment and Plan:         1,2. Recurrent NSCLC  Plan is for definitive chemoRT, will need  re-staging scans prior.  Reviewed with patient in detail her diagnosis, stage, treatment options, and prognosis (3 year OS 66% vs. 43.5% without durvalumab) with standard of care chemoRT followed by maintenance immunotherapy.  Patient has consented for [id number] clinical trial, a randomized control trial evaluating combination chemoRT + durvalumab followed by maintenance vs. SOC chemoRT -> maintenance.  CT scans 7/2021 with CR.  - patient randomized on [id number] to SOC arm (Imfinzi) - completed 12/2021.    - CT scan 12/2022 with progressing pulmonary nodule in LLL, still with stable disease in RLL consolidation. PET scan also showing enlarging right lower lobe mass with increased hypermetabolic activity concerning for recurrence. Will present her case at the next thoracic tumor board to discuss biopsy and possible treatment options.   - Biopsy of lung mass consistent with SCC. Initiated on 9LA. Initial re-staging scans with stable disease.   - Patient has completed RT 4-5 weeks ago and has no symptoms. She has some mild inflammation on Chest CT, but since asymptomatic and completed RT will re-initiate IO.  - Now s/p 3 cycles of 9LA. With persistent IO induced rash and continued pneumonitis on recent imaging, plan to hold IO and proceed with surveillance.  - 10/16/23 CT showing continued evidence of inflammation/infection. Currently has cold symptoms. Test for covid. Repeat imaging in 4 weeks to see if imaging clears after she recovers from illness.    3. No SOB currently. Will hold off on steroids at this time.      4.  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      5-8.  Stable, follows with cardiology. AICD placed, but patient now has recovered EF and only takes lasix prn.  NYHA class I.    9. Continue clobetasol PRN.    10. Hem/Onc psych referral placed. Continue management of antidepressants/anxiolytics with PCP.     Patient was also seen and examined by Dr. Avina. Patient is in agreement with  the proposed treatment plan. All questions were answered to the patient's satisfaction. Pt knows to call clinic if anything is needed before the next clinic visit.    Aide Magaña, MSN, APRN, FNP-C  Hematology and Medical Oncology  Nurse Practitioner to Dr. Javi Avina  Nurse Practitioner, [facility]      I have reviewed the notes, assessments, and/or procedures performed by Aide MONTELONGO, as above.  I have personally interviewed and examined the patient at the beside, and rounded with Aide. I concur with her assessment and plan and the documentation of Hannah Montoya.    MDM includes:    - Acute or chronic illness or injury that poses a threat to life or bodily function  - Independent review and explanation of 2 results from unique tests  - Discussion of management and ordering 2 unique tests  - Extensive discussion of treatment and management  - Prescription drug management  - Drug therapy requiring intensive monitoring for toxicity    Javi Avina M.D.  Hematology/Oncology Attending  Director [facility]  [facility]          Route Chart for Scheduling    Med Onc Chart Routing      Follow up with physician 4 weeks. prior to Opdivo and review imaging   Follow up with DANIEL    Infusion scheduling note    Injection scheduling note    Labs CBC and CMP   Scheduling:  Preferred lab:  Lab interval:     Imaging   CT chest in 4 weeks   Pharmacy appointment    Other referrals              Treatment Plan Information   OP NSCLC NIVOLUMAB 360 MG D1 & D22 WITH IPILIMUMAB 1 MG/KG Q6W PLUS CARBOPLATIN (AUC) PACLITAXEL Q3W X2 DOSES   Javi Avina MD   Upcoming Treatment Dates - OP NSCLC NIVOLUMAB 360 MG D1 & D22 WITH IPILIMUMAB 1 MG/KG Q6W PLUS CARBOPLATIN (AUC) PACLITAXEL Q3W X2 DOSES    8/14/2023       Immunotherapy       nivolumab 360 mg in sodium chloride 0.9% 86 mL infusion  9/4/2023       Immunotherapy       nivolumab 360 mg in sodium  chloride 0.9% 86 mL infusion       ipilimumab (YERVOY) 1 mg/kg = 81 mg in sodium chloride 0.9% 66.2 mL chemo infusion  9/25/2023       Immunotherapy       nivolumab 360 mg in sodium chloride 0.9% 86 mL infusion  10/16/2023       Immunotherapy       nivolumab 360 mg in sodium chloride 0.9% 86 mL infusion       ipilimumab (YERVOY) 1 mg/kg = 81 mg in sodium chloride 0.9% 66.2 mL chemo infusion

## 2023-10-18 NOTE — TELEPHONE ENCOUNTER
Pt called to have someone to go over her CT results. She has an appt today at 9:30. I informed her that Dr. Avina will go over the results with her at that time. She verbalized an understanding.

## 2023-10-20 ENCOUNTER — TELEPHONE (OUTPATIENT)
Dept: HEMATOLOGY/ONCOLOGY | Facility: CLINIC | Age: 68
End: 2023-10-20
Payer: MEDICARE

## 2023-10-20 ENCOUNTER — TELEPHONE (OUTPATIENT)
Dept: INFUSION THERAPY | Facility: HOSPITAL | Age: 68
End: 2023-10-20
Payer: MEDICARE

## 2023-10-23 ENCOUNTER — PATIENT MESSAGE (OUTPATIENT)
Dept: HEMATOLOGY/ONCOLOGY | Facility: CLINIC | Age: 68
End: 2023-10-23
Payer: MEDICARE

## 2023-10-23 ENCOUNTER — TELEPHONE (OUTPATIENT)
Dept: HEMATOLOGY/ONCOLOGY | Facility: CLINIC | Age: 68
End: 2023-10-23
Payer: MEDICARE

## 2023-10-23 NOTE — TELEPHONE ENCOUNTER
"Pt co a cough that started today. She tested positive for Covid last week. Pt was not sure if this is related to the Covid or poss has pneumonitis. Pt reports it " feels like a cold." The cough is wet and produces yellowish phlegm. Pt denies any fever, chest tightness, or pain. She has not tried to take anything for the cough yet. She has a hx of high blood pressure. I recommended her to get Robitussin HBP and take per package instructions. Pt to go to the ER if exp a fever of 100.4 or higher. She is to not take Tylenol or Motrin for fever as this can mask an infectious process. Lastly she is to go to the ER if exp chest pain or tightness. She verbalized an understanding of the aforementioned.  "

## 2023-10-24 ENCOUNTER — PATIENT MESSAGE (OUTPATIENT)
Dept: HEMATOLOGY/ONCOLOGY | Facility: CLINIC | Age: 68
End: 2023-10-24
Payer: MEDICARE

## 2023-10-24 ENCOUNTER — TELEPHONE (OUTPATIENT)
Dept: HEMATOLOGY/ONCOLOGY | Facility: CLINIC | Age: 68
End: 2023-10-24
Payer: MEDICARE

## 2023-10-24 NOTE — TELEPHONE ENCOUNTER
I attempted to call the pt to see how she is doing today. This is in regards to the cough she reported yesterday. Lm on her vm to message me via the portal when she is available to talk.

## 2023-10-24 NOTE — TELEPHONE ENCOUNTER
I called the pt. She is starting to cough less. The cough is starting to get drier. When it is productive the phlegm is now a lighter shade of yellow. She has not gotten Robitussin HBP yet. I enc her to still get it so it is readily available. She verbalized an understanding.

## 2023-10-25 ENCOUNTER — PATIENT MESSAGE (OUTPATIENT)
Dept: ENDOCRINOLOGY | Facility: CLINIC | Age: 68
End: 2023-10-25
Payer: MEDICARE

## 2023-10-25 DIAGNOSIS — E11.9 TYPE 2 DIABETES MELLITUS WITHOUT COMPLICATION, WITHOUT LONG-TERM CURRENT USE OF INSULIN: Primary | ICD-10-CM

## 2023-10-25 NOTE — TELEPHONE ENCOUNTER
Normal kidney function   + moderate proteinuria   Lab Results   Component Value Date    HGBA1C 5.8 (H) 05/25/2023       Very good diabetes control

## 2023-10-31 ENCOUNTER — TELEPHONE (OUTPATIENT)
Dept: HEMATOLOGY/ONCOLOGY | Facility: CLINIC | Age: 68
End: 2023-10-31
Payer: MEDICARE

## 2023-10-31 DIAGNOSIS — R05.1 ACUTE COUGH: Primary | ICD-10-CM

## 2023-10-31 RX ORDER — BENZONATATE 200 MG/1
200 CAPSULE ORAL 3 TIMES DAILY PRN
Qty: 30 CAPSULE | Refills: 1 | Status: SHIPPED | OUTPATIENT
Start: 2023-10-31

## 2023-10-31 NOTE — TELEPHONE ENCOUNTER
Pt informed that a prescription for Tessalon Pearls has been sent in by Aide. Pt asked if she is having shortness of breath that affects her ADLs. She stated no she is able to do her normal activities. Pt informed given this, Aide wants to avoid the use of steriods. Pt verbalized an understanding.

## 2023-10-31 NOTE — TELEPHONE ENCOUNTER
Pt reports she has a lingering cough since having Covid. She recently tested and is now negative. Pt informed that sometimes the cough does linger for awhile. Cough is not as productive as previously reported. When she does have phlegm it is yellowish in color. She has taken Robitusin for it but it does not help. She wants something prescribed for her. Pt also wants to know if she can take the cortisone. I told her I will send the message and note to Aide.

## 2023-11-01 ENCOUNTER — TELEPHONE (OUTPATIENT)
Dept: HEMATOLOGY/ONCOLOGY | Facility: CLINIC | Age: 68
End: 2023-11-01
Payer: MEDICARE

## 2023-11-01 ENCOUNTER — PATIENT MESSAGE (OUTPATIENT)
Dept: HEMATOLOGY/ONCOLOGY | Facility: CLINIC | Age: 68
End: 2023-11-01
Payer: MEDICARE

## 2023-11-01 NOTE — TELEPHONE ENCOUNTER
Pt was concerned about her O2 sat results.The readings that she sent in her message were all wnl. Pt instructed that the readings need to be 90 or above. She is having a deep cough with dark yellow phlegm, occas shortness of breath occurs when coughing. Pt related the Tessalon Perles is helping. Coughing is worse when lying down. Pt has not been elevating her head. Pt instructed when lying down to elevate her upper body on pillows. She is drinking plenty of fluids to help with the phlegm. Pt to contact the clinic prn.

## 2023-11-04 ENCOUNTER — CLINICAL SUPPORT (OUTPATIENT)
Dept: CARDIOLOGY | Facility: HOSPITAL | Age: 68
End: 2023-11-04
Payer: MEDICARE

## 2023-11-04 DIAGNOSIS — Z95.810 PRESENCE OF AUTOMATIC (IMPLANTABLE) CARDIAC DEFIBRILLATOR: ICD-10-CM

## 2023-11-04 PROCEDURE — 93295 CARDIAC DEVICE CHECK - REMOTE: ICD-10-PCS | Mod: HCNC,,, | Performed by: INTERNAL MEDICINE

## 2023-11-04 PROCEDURE — 93296 REM INTERROG EVL PM/IDS: CPT | Mod: HCNC | Performed by: INTERNAL MEDICINE

## 2023-11-04 PROCEDURE — 93295 DEV INTERROG REMOTE 1/2/MLT: CPT | Mod: HCNC,,, | Performed by: INTERNAL MEDICINE

## 2023-11-10 ENCOUNTER — HOSPITAL ENCOUNTER (EMERGENCY)
Facility: HOSPITAL | Age: 68
Discharge: HOME | End: 2023-11-10
Attending: EMERGENCY MEDICINE
Payer: MEDICARE

## 2023-11-10 ENCOUNTER — PATIENT MESSAGE (OUTPATIENT)
Dept: HEMATOLOGY/ONCOLOGY | Facility: CLINIC | Age: 68
End: 2023-11-10
Payer: MEDICARE

## 2023-11-10 ENCOUNTER — TELEPHONE (OUTPATIENT)
Dept: HEMATOLOGY/ONCOLOGY | Facility: CLINIC | Age: 68
End: 2023-11-10
Payer: MEDICARE

## 2023-11-10 VITALS
SYSTOLIC BLOOD PRESSURE: 137 MMHG | TEMPERATURE: 98 F | RESPIRATION RATE: 21 BRPM | OXYGEN SATURATION: 91 % | WEIGHT: 195 LBS | BODY MASS INDEX: 35.88 KG/M2 | HEART RATE: 96 BPM | DIASTOLIC BLOOD PRESSURE: 63 MMHG | HEIGHT: 62 IN

## 2023-11-10 DIAGNOSIS — R91.8 RIGHT LOWER LOBE PULMONARY INFILTRATE: Primary | ICD-10-CM

## 2023-11-10 DIAGNOSIS — C34.91 NON-SMALL CELL CARCINOMA OF LUNG, STAGE 3, RIGHT: ICD-10-CM

## 2023-11-10 DIAGNOSIS — J90 RECURRENT RIGHT PLEURAL EFFUSION: ICD-10-CM

## 2023-11-10 DIAGNOSIS — R06.02 SHORTNESS OF BREATH: ICD-10-CM

## 2023-11-10 LAB
ALBUMIN SERPL BCP-MCNC: 3.2 G/DL (ref 3.5–5.2)
ALP SERPL-CCNC: 64 U/L (ref 55–135)
ALT SERPL W/O P-5'-P-CCNC: 11 U/L (ref 10–44)
ANION GAP SERPL CALC-SCNC: 13 MMOL/L (ref 8–16)
AST SERPL-CCNC: 14 U/L (ref 10–40)
BASOPHILS # BLD AUTO: 0.03 K/UL (ref 0–0.2)
BASOPHILS NFR BLD: 0.4 % (ref 0–1.9)
BILIRUB SERPL-MCNC: 0.3 MG/DL (ref 0.1–1)
BNP SERPL-MCNC: 58 PG/ML (ref 0–99)
BUN SERPL-MCNC: 19 MG/DL (ref 8–23)
CALCIUM SERPL-MCNC: 10.1 MG/DL (ref 8.7–10.5)
CHLORIDE SERPL-SCNC: 106 MMOL/L (ref 95–110)
CO2 SERPL-SCNC: 23 MMOL/L (ref 23–29)
CREAT SERPL-MCNC: 1 MG/DL (ref 0.5–1.4)
DIFFERENTIAL METHOD: ABNORMAL
EOSINOPHIL # BLD AUTO: 0.1 K/UL (ref 0–0.5)
EOSINOPHIL NFR BLD: 0.7 % (ref 0–8)
ERYTHROCYTE [DISTWIDTH] IN BLOOD BY AUTOMATED COUNT: 15.6 % (ref 11.5–14.5)
EST. GFR  (NO RACE VARIABLE): >60 ML/MIN/1.73 M^2
GLUCOSE SERPL-MCNC: 132 MG/DL (ref 70–110)
HCT VFR BLD AUTO: 35 % (ref 37–48.5)
HGB BLD-MCNC: 10.8 G/DL (ref 12–16)
IMM GRANULOCYTES # BLD AUTO: 0.08 K/UL (ref 0–0.04)
IMM GRANULOCYTES NFR BLD AUTO: 1 % (ref 0–0.5)
INR PPP: 1 (ref 0.8–1.2)
LYMPHOCYTES # BLD AUTO: 0.7 K/UL (ref 1–4.8)
LYMPHOCYTES NFR BLD: 8.9 % (ref 18–48)
MAGNESIUM SERPL-MCNC: 1.4 MG/DL (ref 1.6–2.6)
MCH RBC QN AUTO: 25.5 PG (ref 27–31)
MCHC RBC AUTO-ENTMCNC: 30.9 G/DL (ref 32–36)
MCV RBC AUTO: 83 FL (ref 82–98)
MONOCYTES # BLD AUTO: 0.6 K/UL (ref 0.3–1)
MONOCYTES NFR BLD: 7.7 % (ref 4–15)
NEUTROPHILS # BLD AUTO: 6.2 K/UL (ref 1.8–7.7)
NEUTROPHILS NFR BLD: 81.3 % (ref 38–73)
NRBC BLD-RTO: 0 /100 WBC
PLATELET # BLD AUTO: 240 K/UL (ref 150–450)
PMV BLD AUTO: 11.5 FL (ref 9.2–12.9)
POTASSIUM SERPL-SCNC: 4.1 MMOL/L (ref 3.5–5.1)
PROT SERPL-MCNC: 7.5 G/DL (ref 6–8.4)
PROTHROMBIN TIME: 10.5 SEC (ref 9–12.5)
RBC # BLD AUTO: 4.24 M/UL (ref 4–5.4)
SODIUM SERPL-SCNC: 142 MMOL/L (ref 136–145)
TROPONIN I SERPL DL<=0.01 NG/ML-MCNC: 0.01 NG/ML (ref 0–0.03)
WBC # BLD AUTO: 7.67 K/UL (ref 3.9–12.7)

## 2023-11-10 PROCEDURE — 85025 COMPLETE CBC W/AUTO DIFF WBC: CPT | Mod: HCNC | Performed by: EMERGENCY MEDICINE

## 2023-11-10 PROCEDURE — 80053 COMPREHEN METABOLIC PANEL: CPT | Mod: HCNC | Performed by: EMERGENCY MEDICINE

## 2023-11-10 PROCEDURE — 83880 ASSAY OF NATRIURETIC PEPTIDE: CPT | Mod: HCNC | Performed by: EMERGENCY MEDICINE

## 2023-11-10 PROCEDURE — 93010 EKG 12-LEAD: ICD-10-PCS | Mod: HCNC,,, | Performed by: INTERNAL MEDICINE

## 2023-11-10 PROCEDURE — 93005 ELECTROCARDIOGRAM TRACING: CPT | Mod: HCNC

## 2023-11-10 PROCEDURE — 85610 PROTHROMBIN TIME: CPT | Mod: HCNC | Performed by: EMERGENCY MEDICINE

## 2023-11-10 PROCEDURE — 83735 ASSAY OF MAGNESIUM: CPT | Mod: HCNC | Performed by: EMERGENCY MEDICINE

## 2023-11-10 PROCEDURE — 99285 EMERGENCY DEPT VISIT HI MDM: CPT | Mod: 25,HCNC

## 2023-11-10 PROCEDURE — 93010 ELECTROCARDIOGRAM REPORT: CPT | Mod: HCNC,,, | Performed by: INTERNAL MEDICINE

## 2023-11-10 PROCEDURE — 84484 ASSAY OF TROPONIN QUANT: CPT | Mod: HCNC | Performed by: EMERGENCY MEDICINE

## 2023-11-10 RX ORDER — DOXYCYCLINE 100 MG/1
100 CAPSULE ORAL 2 TIMES DAILY
Qty: 20 CAPSULE | Refills: 0 | Status: SHIPPED | OUTPATIENT
Start: 2023-11-10 | End: 2023-11-20

## 2023-11-10 NOTE — TELEPHONE ENCOUNTER
Pt's daughter reported her mother is experiencing chest tightness, and shortness of breath. Pt is having a wet cough. I instructed her to take her mom to the ER immediately here at the main campus. She verbalized an understanding, and agreed to keep us updated.

## 2023-11-10 NOTE — DISCHARGE INSTRUCTIONS
Please follow-up with your oncologist on Tuesday as scheduled.  Please return immediately if you get worse or if new problems develop.  Please return if your shortness of breath gets more severe.  Please return for fever.  Rest.  Doxycycline.

## 2023-11-10 NOTE — ED PROVIDER NOTES
Encounter Date: 11/10/2023    SCRIBE #1 NOTE: I, Sabra Kee, am scribing for, and in the presence of,  Evens Maier MD. I have scribed the following portions of the note - Other sections scribed: HPI, ROS.       History     Chief Complaint   Patient presents with    Shortness of Breath     Pt to ER with reports of increase SOB and chest tightness starting today. Pt diagnosed with COVID 3 weeks prior and has hx of lung CA. Pt sating 90-92% on RA in triage. Pt denies oxygen use.      This is a 68 year old female with a PMHx of AICD, Asthma, Right Breast cancer, Cardiac pacemaker, Cardiomyopathy, COPD, DM type 2, Hyperglycemia, HLD, and HTN, who presents to the ED complaining of Shortness of Breath, symptoms onset three weeks ago.     Patient reports chest tightness at 8 am this morning and wet cough. Patient also reports her chest tightness is not worsened by cough. Patient states being Dx with COVID three weeks prior and experiencing SOB ever since as well as a Hx of Lung Cancer. Patient also states using a Pacemaker. Patient further states concerns for lung congestion. No other alleviating or exacerbating factors. Patient denies Hx of blood clots in legs/lungs, Asthma, arrhythmia, MI, or blood thinners. Patient also denies new swelling/pain in legs. Patient further denies chest pain, fever, chills, abdominal pain, nausea, vomiting, diarrhea, dysuria, headaches, congestion, sore throat, arm or leg trouble, eye pain, ear pain, rash, or other associated symptoms. This is the extent of the patient's complaints in the ED.     The history is provided by the patient. No  was used.     Review of patient's allergies indicates:   Allergen Reactions    Taxol [paclitaxel]      Hypersensitivity reaction to taxol, symptoms included shortness of breath, nausea, dizziness, flushing     Carboplatin Other (See Comments)     Itching and hives    Adhesive Rash     tegaderm burns and blistered skin     Past  Medical History:   Diagnosis Date    AICD (automatic cardioverter/defibrillator) present     Asthma     bronchitis in past    Breast cancer 2016    right    Cardiac pacemaker     Cardiomyopathy     COPD (chronic obstructive pulmonary disease)     Diabetes mellitus, type 2     Hyperglycemia     Hyperlipidemia     Hypertension     Malignant neoplasm of overlapping sites of female breast 2016    Nuclear sclerosis of both eyes 2020    Respiratory distress 3/12/2020     Past Surgical History:   Procedure Laterality Date    BIOPSY, WITH CT GUIDANCE Right 2023    Procedure: LUNG MASS BIOPSY, WITH CT GUIDANCE;  Surgeon: Yehuda Green MD;  Location: [facility] CATH LAB;  Service: Radiology;  Laterality: Right;    BREAST BIOPSY Right 2016    IDC    BREAST LUMPECTOMY Right     CARDIAC CATHETERIZATION Bilateral 2017    CARDIAC DEFIBRILLATOR PLACEMENT Left 08/10/2017    CARDIAC DEFIBRILLATOR PLACEMENT Left 2018     SECTION      x2    CHOLECYSTECTOMY      INSERTION OF TUNNELED CENTRAL VENOUS CATHETER (CVC) WITH SUBCUTANEOUS PORT Right 2020    Procedure: [id number], RIGHT;  Surgeon: Josefa Caceres MD;  Location: [address];  Service: General;  Laterality: Right;  Port-a-cath placed to R. IJ    LUNG BIOPSY N/A 2020    Procedure: BIOPSY, LUNG;  Surgeon: [facility] Diagnostic Provider;  Location: [facility] OR;  Service: Radiology;  Laterality: N/A;  8AM START  RN PREOP 2020---COVID NEGATIVE    NAVIGATIONAL BRONCHOSCOPY N/A 10/13/2020    Procedure: BRONCHOSCOPY, NAVIGATIONAL;  Surgeon: Jamilah Weaver MD;  Location: [address];  Service: Pulmonary;  Laterality: N/A;    REVISION OF IMPLANTABLE CARDIOVERTER-DEFIBRILLATOR (ICD) ELECTRODE LEAD PLACEMENT N/A 6/15/2018    Procedure: REVISION-LEAD-ICD;  Surgeon: Javy Gates MD;  Location: [facility] CATH LAB;  Service: Cardiology;  Laterality: N/A;  LBBB, Bi-V ICD HIS Elmo jordan, MDT, Choice, MB, 3 Prep    ROBOT-ASSISTED  LAPAROSCOPIC LYMPHADENECTOMY USING DA SALVADOR XI Right 10/23/2020    Procedure: XI ROBOTIC LYMPHADENECTOMY;  Surgeon: Ramo Tucker MD;  Location: [facility] OR [address] [place] [address];  Service: Thoracic;  Laterality: Right;    TUBAL LIGATION       Family History   Problem Relation Age of Onset    Kidney disease Mother     Cataracts Mother     Kidney disease Father     Cataracts Father     Clotting disorder Brother     No Known Problems Daughter     No Known Problems Son     No Known Problems Sister     No Known Problems Maternal Aunt     No Known Problems Maternal Uncle     No Known Problems Paternal Aunt     No Known Problems Paternal Uncle     Macular degeneration Maternal Grandmother     No Known Problems Maternal Grandfather     No Known Problems Paternal Grandmother     No Known Problems Paternal Grandfather     Breast cancer Neg Hx     Ovarian cancer Neg Hx     Amblyopia Neg Hx     Blindness Neg Hx     Cancer Neg Hx     Diabetes Neg Hx     Glaucoma Neg Hx     Hypertension Neg Hx     Retinal detachment Neg Hx     Strabismus Neg Hx     Stroke Neg Hx     Thyroid disease Neg Hx      Social History     Tobacco Use    Smoking status: Former     Current packs/day: 0.00     Average packs/day: 0.5 packs/day for 40.0 years (20.0 ttl pk-yrs)     Types: Cigarettes     Start date: 1976     Quit date: 2016     Years since quittin.2     Passive exposure: Past    Smokeless tobacco: Never   Substance Use Topics    Alcohol use: Yes     Alcohol/week: 1.0 standard drink of alcohol     Types: 1 Glasses of wine per week     Comment: rare- holiday    Drug use: No     Review of Systems   Constitutional:  Negative for chills, diaphoresis and fever.   HENT:  Negative for ear pain and sore throat.    Eyes:  Negative for pain.   Respiratory:  Positive for cough (wet), chest tightness and shortness of breath.    Cardiovascular:  Negative for chest pain.   Gastrointestinal:  Negative for abdominal pain, diarrhea, nausea and vomiting.    Genitourinary:  Negative for dysuria.   Musculoskeletal:  Negative for back pain.        (-) Arm or leg trouble.    Skin:  Negative for rash.   Neurological:  Negative for headaches.   Psychiatric/Behavioral:  Negative for confusion.        Physical Exam     Initial Vitals [11/10/23 1153]   BP Pulse Resp Temp SpO2   139/71 89 20 98.3 °F (36.8 °C) (!) 92 %      MAP       --         Physical Exam  The patient was examined specifically for the following:   General:No significant distress, Good color, Warm and dry. Head and neck:Scalp atraumatic, Neck supple. Neurological:Appropriate conversation, Gross motor deficits. Eyes:Conjugate gaze, Clear corneas. ENT: No epistaxis. Cardiac: Regular rate and rhythm, Grossly normal heart tones. Pulmonary: Wheezing, Rales. Gastrointestinal: Abdominal tenderness, Abdominal distention. Musculoskeletal: Extremity deformity, Apparent pain with range of motion of the joints. Skin: Rash.   The findings on examination were normal except for the following:  The patient's heart rate is 103 during the physical examination.  The oxygen saturations are 95%.  The lungs are clear.  The heart tones are normal.  The abdomen is soft.  ED Course   Procedures  Labs Reviewed   COMPREHENSIVE METABOLIC PANEL - Abnormal; Notable for the following components:       Result Value    Glucose 132 (*)     Albumin 3.2 (*)     All other components within normal limits   CBC W/ AUTO DIFFERENTIAL - Abnormal; Notable for the following components:    Hemoglobin 10.8 (*)     Hematocrit 35.0 (*)     MCH 25.5 (*)     MCHC 30.9 (*)     RDW 15.6 (*)     Immature Granulocytes 1.0 (*)     Immature Grans (Abs) 0.08 (*)     Lymph # 0.7 (*)     Gran % 81.3 (*)     Lymph % 8.9 (*)     All other components within normal limits   MAGNESIUM - Abnormal; Notable for the following components:    Magnesium 1.4 (*)     All other components within normal limits   PROTIME-INR   B-TYPE NATRIURETIC PEPTIDE   TROPONIN I     EKG Readings:  (Independently Interpreted)   This patient is in an atrial sensed, ventricular paced rhythm.  There are nonspecific ST segment and T-wave changes.  There is no definite evidence of acute myocardial infarction or malignant arrhythmia.       Imaging Results              X-Ray Chest AP Portable (Final result)  Result time 11/10/23 13:38:26      Final result by Serafin Loya Jr., MD (11/10/23 13:38:26)                   Impression:      New right lower lobe infiltrate consistent with pneumonia and small right pleural effusion.  Borderline heart size.  AICD in place.      Electronically signed by: Serafin Loya MD  Date:    11/10/2023  Time:    13:38               Narrative:    EXAMINATION:  XR CHEST AP PORTABLE    CLINICAL HISTORY:  chest pain;    TECHNIQUE:  Single frontal view of the chest was performed.    COMPARISON:  Chest x-ray of May 27, 2023.    FINDINGS:  An AICD is noted in place on the left with leads to the right heart.  The cardiac size is upper limits of normal.  A right upper lobe infiltrate has resolved since the prior chest x-ray however there is a new right lower lobe infiltrate and small right pleural effusion noted.  The left hemithorax appears clear.                                       Medications - No data to display  Medical Decision Making  Amount and/or Complexity of Data Reviewed  Labs: ordered.  Radiology: ordered.    Given the above this patient presents emergency room with shortness of breath.  The patient has some chest tightness and a cough.  The patient had COVID 3 weeks ago.  She has a history of lung cancer.  She also has a pacemaker.  There is no history of pulmonary emboli deep venous thrombosis.  The patient has no pain or swelling in her legs.  There is no actual sputum production.  Chest x-ray reveals a right lower lobe infiltrate and right pleural effusion.  The patient has oxygen saturations of 91-92%.  She does not appear to be distress.  She is not been febrile.  The  patient was offered admission to the hospital she declined.  She reports that she has an appointment with her oncologist on Tuesday.  She wished to be treated with antibiotics for her pneumonia.  She would like to follow up with her oncologist on Tuesday.   I will defer to oncology for further evaluation and treatment.  There is some doubt about whether the right role lobe effusion and infiltrate are from an infectious process.  The patient has no leukocytosis but does have a preponderance of granulocytes and some immature granulocytes.  Given the findings above the right lower lobe infiltrate and effusion are more likely the cause of the patient's shortness of breath and pulmonary embolus.  I doubt failure.  The troponin and BNP are normal.  I doubt myocardial infarction.  I will discharge this patient has she demands.  The patient has a history of non-small-cell lung carcinoma, of the right lung.  Neoplasm seems to be more likely cause of this picture.       Scribe Attestation:   Scribe #1: I performed the above scribed service and the documentation accurately describes the services I performed. I attest to the accuracy of the note.                  Please note that the documentation on this chart was provided by the scribe above on the date of service noted above, and that the documentation in the chart accurately reflects the work and decisions made by me alone.  Signed, Dr. Maier        Clinical Impression:   Final diagnoses:  [R06.02] Shortness of breath  [R91.8] Right lower lobe pulmonary infiltrate (Primary)  [J90] Recurrent right pleural effusion  [C34.91] Non-small cell carcinoma of lung, stage 3, right        ED Disposition Condition    Discharge Stable          ED Prescriptions       Medication Sig Dispense Start Date End Date Auth. Provider    doxycycline (VIBRAMYCIN) 100 MG Cap Take 1 capsule (100 mg total) by mouth 2 (two) times daily. for 10 days 20 capsule 11/10/2023 11/20/2023 Evens Maier,  MD          Follow-up Information       Follow up With Specialties Details Why Contact Info    Kathy, Emanuel MOSQUERA MD Family Medicine, Wound Care In 1 week  [address] [facility]  [place] [id number]  [phone]               Evens Maier MD  11/10/23 [id number]

## 2023-11-13 ENCOUNTER — TELEPHONE (OUTPATIENT)
Dept: HEMATOLOGY/ONCOLOGY | Facility: CLINIC | Age: 68
End: 2023-11-13
Payer: MEDICARE

## 2023-11-13 ENCOUNTER — PATIENT OUTREACH (OUTPATIENT)
Dept: EMERGENCY MEDICINE | Facility: HOSPITAL | Age: 68
End: 2023-11-13

## 2023-11-13 NOTE — TELEPHONE ENCOUNTER
Pt was diagnosed with pneumonia on 11/10 at the ER. She reported she is doing better since started on Doxycycline. She is using Robitussin for the cough. Cough has lessened. Pt to contact the clinic prn any concerns.

## 2023-11-14 ENCOUNTER — HOSPITAL ENCOUNTER (OUTPATIENT)
Dept: RADIOLOGY | Facility: HOSPITAL | Age: 68
Discharge: HOME | End: 2023-11-14
Attending: NURSE PRACTITIONER
Payer: MEDICARE

## 2023-11-14 DIAGNOSIS — C34.91 NSCLC OF RIGHT LUNG: ICD-10-CM

## 2023-11-14 PROCEDURE — 71260 CT CHEST WITH CONTRAST: ICD-10-PCS | Mod: 26,HCNC,, | Performed by: STUDENT IN AN ORGANIZED HEALTH CARE EDUCATION/TRAINING PROGRAM

## 2023-11-14 PROCEDURE — 71260 CT THORAX DX C+: CPT | Mod: TC,HCNC

## 2023-11-14 PROCEDURE — 71260 CT THORAX DX C+: CPT | Mod: 26,HCNC,, | Performed by: STUDENT IN AN ORGANIZED HEALTH CARE EDUCATION/TRAINING PROGRAM

## 2023-11-14 PROCEDURE — 25500020 PHARM REV CODE 255: Mod: HCNC | Performed by: NURSE PRACTITIONER

## 2023-11-14 RX ADMIN — IOHEXOL 75 ML: 350 INJECTION, SOLUTION INTRAVENOUS at 11:11

## 2023-11-16 ENCOUNTER — OFFICE VISIT (OUTPATIENT)
Dept: HEMATOLOGY/ONCOLOGY | Facility: CLINIC | Age: 68
End: 2023-11-16
Payer: MEDICARE

## 2023-11-16 VITALS
TEMPERATURE: 98 F | BODY MASS INDEX: 31.89 KG/M2 | HEART RATE: 94 BPM | WEIGHT: 173.31 LBS | RESPIRATION RATE: 18 BRPM | HEIGHT: 62 IN | SYSTOLIC BLOOD PRESSURE: 139 MMHG | DIASTOLIC BLOOD PRESSURE: 66 MMHG | OXYGEN SATURATION: 93 %

## 2023-11-16 DIAGNOSIS — C34.91 NSCLC OF RIGHT LUNG: Primary | ICD-10-CM

## 2023-11-16 DIAGNOSIS — I42.0 DILATED CARDIOMYOPATHY: ICD-10-CM

## 2023-11-16 DIAGNOSIS — U07.1 COVID-19 VIRUS INFECTION: ICD-10-CM

## 2023-11-16 DIAGNOSIS — L27.0 DRUG DERMATITIS: ICD-10-CM

## 2023-11-16 DIAGNOSIS — Z85.3 HISTORY OF BREAST CANCER IN FEMALE: ICD-10-CM

## 2023-11-16 DIAGNOSIS — F41.9 ANXIETY: ICD-10-CM

## 2023-11-16 DIAGNOSIS — I44.7 LEFT BUNDLE-BRANCH BLOCK: ICD-10-CM

## 2023-11-16 DIAGNOSIS — Z95.810 CARDIAC RESYNCHRONIZATION THERAPY DEFIBRILLATOR (CRT-D) IN PLACE: ICD-10-CM

## 2023-11-16 DIAGNOSIS — J70.0 RADIATION PNEUMONITIS: ICD-10-CM

## 2023-11-16 DIAGNOSIS — I10 ESSENTIAL HYPERTENSION: ICD-10-CM

## 2023-11-16 PROCEDURE — 3062F POS MACROALBUMINURIA REV: CPT | Mod: HCNC,CPTII,S$GLB, | Performed by: INTERNAL MEDICINE

## 2023-11-16 PROCEDURE — 3044F PR MOST RECENT HEMOGLOBIN A1C LEVEL <7.0%: ICD-10-PCS | Mod: HCNC,CPTII,S$GLB, | Performed by: INTERNAL MEDICINE

## 2023-11-16 PROCEDURE — 1101F PR PT FALLS ASSESS DOC 0-1 FALLS W/OUT INJ PAST YR: ICD-10-PCS | Mod: HCNC,CPTII,S$GLB, | Performed by: INTERNAL MEDICINE

## 2023-11-16 PROCEDURE — 1159F PR MEDICATION LIST DOCUMENTED IN MEDICAL RECORD: ICD-10-PCS | Mod: HCNC,CPTII,S$GLB, | Performed by: INTERNAL MEDICINE

## 2023-11-16 PROCEDURE — 3075F SYST BP GE 130 - 139MM HG: CPT | Mod: HCNC,CPTII,S$GLB, | Performed by: INTERNAL MEDICINE

## 2023-11-16 PROCEDURE — 4010F PR ACE/ARB THEARPY RXD/TAKEN: ICD-10-PCS | Mod: HCNC,CPTII,S$GLB, | Performed by: INTERNAL MEDICINE

## 2023-11-16 PROCEDURE — 99215 OFFICE O/P EST HI 40 MIN: CPT | Mod: HCNC,S$GLB,, | Performed by: INTERNAL MEDICINE

## 2023-11-16 PROCEDURE — 1157F ADVNC CARE PLAN IN RCRD: CPT | Mod: HCNC,CPTII,S$GLB, | Performed by: INTERNAL MEDICINE

## 2023-11-16 PROCEDURE — 1126F AMNT PAIN NOTED NONE PRSNT: CPT | Mod: HCNC,CPTII,S$GLB, | Performed by: INTERNAL MEDICINE

## 2023-11-16 PROCEDURE — 1101F PT FALLS ASSESS-DOCD LE1/YR: CPT | Mod: HCNC,CPTII,S$GLB, | Performed by: INTERNAL MEDICINE

## 2023-11-16 PROCEDURE — 3078F DIAST BP <80 MM HG: CPT | Mod: HCNC,CPTII,S$GLB, | Performed by: INTERNAL MEDICINE

## 2023-11-16 PROCEDURE — 4010F ACE/ARB THERAPY RXD/TAKEN: CPT | Mod: HCNC,CPTII,S$GLB, | Performed by: INTERNAL MEDICINE

## 2023-11-16 PROCEDURE — 1159F MED LIST DOCD IN RCRD: CPT | Mod: HCNC,CPTII,S$GLB, | Performed by: INTERNAL MEDICINE

## 2023-11-16 PROCEDURE — 3078F PR MOST RECENT DIASTOLIC BLOOD PRESSURE < 80 MM HG: ICD-10-PCS | Mod: HCNC,CPTII,S$GLB, | Performed by: INTERNAL MEDICINE

## 2023-11-16 PROCEDURE — 3075F PR MOST RECENT SYSTOLIC BLOOD PRESS GE 130-139MM HG: ICD-10-PCS | Mod: HCNC,CPTII,S$GLB, | Performed by: INTERNAL MEDICINE

## 2023-11-16 PROCEDURE — 3288F PR FALLS RISK ASSESSMENT DOCUMENTED: ICD-10-PCS | Mod: HCNC,CPTII,S$GLB, | Performed by: INTERNAL MEDICINE

## 2023-11-16 PROCEDURE — 3066F NEPHROPATHY DOC TX: CPT | Mod: HCNC,CPTII,S$GLB, | Performed by: INTERNAL MEDICINE

## 2023-11-16 PROCEDURE — 99999 PR PBB SHADOW E&M-EST. PATIENT-LVL III: ICD-10-PCS | Mod: PBBFAC,HCNC,, | Performed by: INTERNAL MEDICINE

## 2023-11-16 PROCEDURE — 3044F HG A1C LEVEL LT 7.0%: CPT | Mod: HCNC,CPTII,S$GLB, | Performed by: INTERNAL MEDICINE

## 2023-11-16 PROCEDURE — 3288F FALL RISK ASSESSMENT DOCD: CPT | Mod: HCNC,CPTII,S$GLB, | Performed by: INTERNAL MEDICINE

## 2023-11-16 PROCEDURE — 99215 PR OFFICE/OUTPT VISIT, EST, LEVL V, 40-54 MIN: ICD-10-PCS | Mod: HCNC,S$GLB,, | Performed by: INTERNAL MEDICINE

## 2023-11-16 PROCEDURE — 3008F BODY MASS INDEX DOCD: CPT | Mod: HCNC,CPTII,S$GLB, | Performed by: INTERNAL MEDICINE

## 2023-11-16 PROCEDURE — 3066F PR DOCUMENTATION OF TREATMENT FOR NEPHROPATHY: ICD-10-PCS | Mod: HCNC,CPTII,S$GLB, | Performed by: INTERNAL MEDICINE

## 2023-11-16 PROCEDURE — 1126F PR PAIN SEVERITY QUANTIFIED, NO PAIN PRESENT: ICD-10-PCS | Mod: HCNC,CPTII,S$GLB, | Performed by: INTERNAL MEDICINE

## 2023-11-16 PROCEDURE — 99999 PR PBB SHADOW E&M-EST. PATIENT-LVL III: CPT | Mod: PBBFAC,HCNC,, | Performed by: INTERNAL MEDICINE

## 2023-11-16 PROCEDURE — 3062F PR POS MACROALBUMINURIA RESULT DOCUMENTED/REVIEW: ICD-10-PCS | Mod: HCNC,CPTII,S$GLB, | Performed by: INTERNAL MEDICINE

## 2023-11-16 PROCEDURE — 3008F PR BODY MASS INDEX (BMI) DOCUMENTED: ICD-10-PCS | Mod: HCNC,CPTII,S$GLB, | Performed by: INTERNAL MEDICINE

## 2023-11-16 PROCEDURE — 1157F PR ADVANCE CARE PLAN OR EQUIV PRESENT IN MEDICAL RECORD: ICD-10-PCS | Mod: HCNC,CPTII,S$GLB, | Performed by: INTERNAL MEDICINE

## 2023-11-16 NOTE — PROGRESS NOTES
ONCOLOGY FOLLOW UP VISIT.     Reason for visit: Toxicity visit on 9LA for recurrent stage III NSCLC    Cancer/Stage/TNM:    Cancer Staging   NSCLC of right lung  Staging form: Lung, AJCC 8th Edition  - Clinical stage from 9/29/2020: Stage IIIA (cT3, cN1, cM0) - Signed by Ramo Tucker MD on 10/24/2020         Oncology History   NSCLC of right lung   9/29/2020 Cancer Staged    Staging form: Lung, AJCC 8th Edition  - Clinical stage from 9/29/2020: Stage IIIA (cT3, cN1, cM0)     10/14/2020 Initial Diagnosis    NSCLC of right lung     11/17/2020 - 12/30/2020 Radiation Therapy    Treating physician: Harvinder Lara     Site  Technique  Energy  Dose/Fx (Gy)  #Fx  Total Dose (Gy)    RLL Lung  VMAT  6X  2  30 / 30  60       2/6/2023 -  Chemotherapy    Treatment Summary   Plan Name: OP NSCLC NIVOLUMAB 360 MG D1 & D22 WITH IPILIMUMAB 1 MG/KG Q6W PLUS CARBOPLATIN (AUC) PACLITAXEL Q3W X2 DOSES  Treatment Goal: Control  Status: Active  Start Date: 2/6/2023  End Date: 2/10/2025 (Planned)  Provider: Javi Avina MD  Chemotherapy: CARBOplatin (PARAPLATIN) 525 mg in sodium chloride 0.9% 337.5 mL chemo infusion, 525 mg, Intravenous, Clinic/HOD 1 time, 1 of 1 cycle  Administration: 525 mg (2/6/2023), 490 mg (2/27/2023)  PACLitaxeL (TAXOL) 200 mg/m2 = 396 mg in sodium chloride 0.9% 500 mL chemo infusion, 200 mg/m2 = 396 mg (100 % of original dose 200 mg/m2), Intravenous, Clinic/HOD 1 time, 1 of 1 cycle  Dose modification: 200 mg/m2 (original dose 200 mg/m2, Cycle 1), 200 mg/m2 (original dose 200 mg/m2, Cycle 1)  Administration: 396 mg (2/6/2023), 396 mg (2/27/2023)     6/12/2023 - 6/30/2023 Radiation Therapy    Treating physician: Harvinder Lara    Site Technique Energy Dose/Fx (Gy) #Fx Total Dose (Gy)   RLL Lung RtLung 6X 4 15 / 15 60           Interval History:   Today, patient reports feeling anxious. Currently on Doxycycline. SOB has improved.     ROS:  Review of Systems   Constitutional:  Negative for chills, fever  and weight loss.   HENT:  Negative for hearing loss, nosebleeds and sore throat.    Eyes:  Negative for blurred vision and double vision.   Respiratory:  Positive for cough (wet) and wheezing. Negative for hemoptysis and shortness of breath.    Cardiovascular:  Negative for chest pain and leg swelling.   Gastrointestinal:  Negative for abdominal pain, blood in stool, diarrhea, heartburn, nausea and vomiting.   Genitourinary:  Negative for dysuria, frequency and hematuria.   Musculoskeletal:  Negative for back pain, joint pain and myalgias.   Skin:  Positive for rash. Negative for itching.   Neurological:  Negative for dizziness, tingling, sensory change, speech change and headaches.   Endo/Heme/Allergies:  Does not bruise/bleed easily.   Psychiatric/Behavioral:  The patient is not nervous/anxious.           A complete 12-point review of systems was reviewed and is negative except as mentioned above.     Past Medical History:   Past Medical History:   Diagnosis Date    AICD (automatic cardioverter/defibrillator) present     Asthma     bronchitis in past    Breast cancer 2016    right    Cardiac pacemaker     Cardiomyopathy     COPD (chronic obstructive pulmonary disease)     Diabetes mellitus, type 2     Hyperglycemia     Hyperlipidemia     Hypertension     Malignant neoplasm of overlapping sites of female breast 2/12/2016    Nuclear sclerosis of both eyes 8/12/2020    Respiratory distress 3/12/2020        Allergies:   Review of patient's allergies indicates:   Allergen Reactions    Taxol [paclitaxel]      Hypersensitivity reaction to taxol, symptoms included shortness of breath, nausea, dizziness, flushing     Carboplatin Other (See Comments)     Itching and hives    Adhesive Rash     tegaderm burns and blistered skin        Medications:   Current Outpatient Medications   Medication Sig Dispense Refill    ACCU-CHEK ALFRED PLUS TEST STRP Strp USE TO TEST THREE TIMES DAILY 200 strip 3    albuterol  sulfate (PROAIR RESPICLICK) 90 mcg/actuation AePB Inhale 2 puffs into the lungs every 4 (four) hours as needed. Rescue 1 each 5    amLODIPine (NORVASC) 5 MG tablet TAKE 1 TABLET BY MOUTH EVERY DAY (Patient taking differently: Take 5 mg by mouth 2 (two) times daily.) 90 tablet 3    atorvastatin (LIPITOR) 10 MG tablet Take 1 tablet (10 mg total) by mouth once daily. (Patient taking differently: Take 10 mg by mouth once daily. Patient taking EOD) 90 tablet 3    benzonatate (TESSALON) 200 MG capsule Take 1 capsule (200 mg total) by mouth 3 (three) times daily as needed for Cough. 30 capsule 1    betamethasone dipropionate 0.05 % cream Apply topically 2 (two) times daily as needed (rash). 45 g 2    buPROPion (WELLBUTRIN XL) 150 MG TB24 tablet TAKE 1 TABLET BY MOUTH EVERY DAY 90 tablet 0    cetirizine (ZYRTEC) 10 MG tablet Take 10 mg by mouth every evening.       doxycycline (VIBRAMYCIN) 100 MG Cap Take 1 capsule (100 mg total) by mouth 2 (two) times daily. for 10 days 20 capsule 0    losartan (COZAAR) 100 MG tablet Take 1 tablet (100 mg total) by mouth once daily. 90 tablet 2    metFORMIN (GLUCOPHAGE) 500 MG tablet Take 1 tablet (500 mg total) by mouth 2 (two) times daily with meals. 180 tablet 3    metoprolol succinate (TOPROL-XL) 100 MG 24 hr tablet Take 1 tablet (100 mg total) by mouth once daily. 90 tablet 3    multivitamin (THERAGRAN) per tablet Take 1 tablet by mouth once daily.      mupirocin (BACTROBAN) 2 % ointment Apply topically 3 (three) times daily. 30 g 1    ondansetron (ZOFRAN-ODT) 8 MG TbDL Take 1 tablet (8 mg total) by mouth every 8 (eight) hours as needed (nausea/vomiting). Take 1 tablet (8 mg) by mouth every 8 hours as needed for nausea/vomiting. 60 tablet 5    OPDIVO 120 mg/12 mL Soln       OPDIVO 240 mg/24 mL Soln       OPDIVO 40 mg/4 mL injection       palonosetron (ALOXI) 0.25 mg/5 mL injection       pantoprazole (PROTONIX) 40 MG tablet Take 1 tablet (40 mg total) by mouth once  "daily. 90 tablet 3    prochlorperazine (COMPAZINE) 10 MG tablet Take 1 tablet (10 mg total) by mouth every 6 (six) hours as needed (nausea/vomiting - second choice). 30 tablet 1    READI-CAT 2 2 % (w/v) suspension       semaglutide (OZEMPIC) 0.25 mg or 0.5 mg(2 mg/1.5 mL) pen injector Inject 0.5 mg into the skin every 7 days. 1.5 mL 6    sertraline (ZOLOFT) 100 MG tablet TAKE 1 TABLET BY MOUTH EVERY EVENING. 90 tablet 3    tiotropium (SPIRIVA WITH HANDIHALER) 18 mcg inhalation capsule INHALE 1 CAPSULE BY MOUTH EVERY DAY. 90 capsule 3    triamcinolone acetonide 0.1% (KENALOG) 0.1 % cream Apply topically 2 (two) times daily. 453.6 g 2    WIXELA INHUB 250-50 mcg/dose diskus inhaler INHALE 1 PUFF INTO THE LUNGS 2 (TWO) TIMES DAILY. CONTROLLER 180 each 3    YERVOY 50 mg/10 mL (5 mg/mL)        No current facility-administered medications for this visit.     Facility-Administered Medications Ordered in Other Visits   Medication Dose Route Frequency Provider Last Rate Last Admin    fentaNYL injection 25 mcg  25 mcg Intravenous Q5 Min PRN Keesha Martins MD        haloperidol lactate injection 0.5 mg  0.5 mg Intravenous Once PRN Keesha Martins MD        HYDROmorphone injection 0.2 mg  0.2 mg Intravenous Q5 Min PRN Keesha Martins MD        ondansetron injection 4 mg  4 mg Intravenous Once PRN Keesha Martins MD        sodium chloride 0.9% flush 10 mL  10 mL Intravenous PRN Keesha Martins MD            Physical Exam:   /66 (BP Location: Left arm, Patient Position: Sitting, BP Method: Medium (Automatic))   Pulse 94   Temp 98.4 °F (36.9 °C) (Oral)   Resp 18   Ht 5' 2" (1.575 m)   Wt 78.6 kg (173 lb 4.5 oz)   LMP  (LMP Unknown)   SpO2 (!) 93%   BMI 31.69 kg/m²      ECOG Performance Status: (foot note - ECOG PS provided by Eastern Cooperative Oncology Group) 0 - Asymptomatic    Physical Exam  Vitals and nursing note reviewed.   Constitutional:       Appearance: Normal appearance. She is well-developed and normal weight. "   HENT:      Head: Normocephalic and atraumatic.   Eyes:      Conjunctiva/sclera: Conjunctivae normal.      Pupils: Pupils are equal, round, and reactive to light.   Pulmonary:      Effort: Pulmonary effort is normal. No respiratory distress.   Abdominal:      General: There is no distension.      Palpations: Abdomen is soft.   Musculoskeletal:         General: No swelling. Normal range of motion.      Cervical back: Normal range of motion and neck supple.      Left ankle: Swelling (trace) present.   Lymphadenopathy:      Cervical: No cervical adenopathy.   Skin:     General: Skin is warm and dry.      Findings: Rash (chest, back, and thighs) present. Rash is macular and papular.   Neurological:      General: No focal deficit present.      Mental Status: She is alert and oriented to person, place, and time.   Psychiatric:         Mood and Affect: Mood and affect normal.         Behavior: Behavior normal.               Labs:   Recent Results (from the past 48 hour(s))   CBC W/ AUTO DIFFERENTIAL    Collection Time: 11/14/23 11:19 AM   Result Value Ref Range    WBC 5.99 3.90 - 12.70 K/uL    RBC 4.27 4.00 - 5.40 M/uL    Hemoglobin 10.9 (L) 12.0 - 16.0 g/dL    Hematocrit 34.8 (L) 37.0 - 48.5 %    MCV 82 82 - 98 fL    MCH 25.5 (L) 27.0 - 31.0 pg    MCHC 31.3 (L) 32.0 - 36.0 g/dL    RDW 16.1 (H) 11.5 - 14.5 %    Platelets 229 150 - 450 K/uL    MPV 12.0 9.2 - 12.9 fL    Immature Granulocytes 1.2 (H) 0.0 - 0.5 %    Gran # (ANC) 4.5 1.8 - 7.7 K/uL    Immature Grans (Abs) 0.07 (H) 0.00 - 0.04 K/uL    Lymph # 0.7 (L) 1.0 - 4.8 K/uL    Mono # 0.6 0.3 - 1.0 K/uL    Eos # 0.0 0.0 - 0.5 K/uL    Baso # 0.04 0.00 - 0.20 K/uL    nRBC 0 0 /100 WBC    Gran % 75.4 (H) 38.0 - 73.0 %    Lymph % 12.0 (L) 18.0 - 48.0 %    Mono % 10.0 4.0 - 15.0 %    Eosinophil % 0.7 0.0 - 8.0 %    Basophil % 0.7 0.0 - 1.9 %    Differential Method Automated    CMP    Collection Time: 11/14/23 11:19 AM   Result Value Ref Range    Sodium 143 136 - 145 mmol/L     Potassium 4.4 3.5 - 5.1 mmol/L    Chloride 107 95 - 110 mmol/L    CO2 26 23 - 29 mmol/L    Glucose 107 70 - 110 mg/dL    BUN 20 8 - 23 mg/dL    Creatinine 0.9 0.5 - 1.4 mg/dL    Calcium 9.7 8.7 - 10.5 mg/dL    Total Protein 7.3 6.0 - 8.4 g/dL    Albumin 3.1 (L) 3.5 - 5.2 g/dL    Total Bilirubin 0.4 0.1 - 1.0 mg/dL    Alkaline Phosphatase 63 55 - 135 U/L    AST 14 10 - 40 U/L    ALT 13 10 - 44 U/L    eGFR >60.0 >60 mL/min/1.73 m^2    Anion Gap 10 8 - 16 mmol/L              Imaging:   X-Ray Chest AP Portable  Narrative: EXAMINATION:  XR CHEST AP PORTABLE    CLINICAL HISTORY:  chest pain;    TECHNIQUE:  Single frontal view of the chest was performed.    COMPARISON:  Chest x-ray of May 27, 2023.    FINDINGS:  An AICD is noted in place on the left with leads to the right heart.  The cardiac size is upper limits of normal.  A right upper lobe infiltrate has resolved since the prior chest x-ray however there is a new right lower lobe infiltrate and small right pleural effusion noted.  The left hemithorax appears clear.  Impression: New right lower lobe infiltrate consistent with pneumonia and small right pleural effusion.  Borderline heart size.  AICD in place.    Electronically signed by: Serafin Loya MD  Date:    11/10/2023  Time:    13:38            Diagnoses:       1. NSCLC of right lung    2. Radiation pneumonitis    3. COVID-19 virus infection    4. History of breast cancer in female    5. Dilated cardiomyopathy    6. Left bundle-branch block    7. Cardiac resynchronization therapy defibrillator (CRT-D) in place    8. Essential hypertension    9. Drug dermatitis    10. Anxiety          Assessment and Plan:         1-3. Recurrent NSCLC  Plan is for definitive chemoRT, will need re-staging scans prior.  Reviewed with patient in detail her diagnosis, stage, treatment options, and prognosis (3 year OS 66% vs. 43.5% without durvalumab) with standard of care chemoRT followed by maintenance immunotherapy.  Patient has  consented for [id number] clinical trial, a randomized control trial evaluating combination chemoRT + durvalumab followed by maintenance vs. SOC chemoRT -> maintenance.  CT scans 7/2021 with CR.  - patient randomized on [id number] to SOC arm (Imfinzi) - completed 12/2021.    - CT scan 12/2022 with progressing pulmonary nodule in LLL, still with stable disease in RLL consolidation. PET scan also showing enlarging right lower lobe mass with increased hypermetabolic activity concerning for recurrence. Will present her case at the next thoracic tumor board to discuss biopsy and possible treatment options.   - Biopsy of lung mass consistent with SCC. Initiated on 9LA. Initial re-staging scans with stable disease.   - Patient has completed RT 4-5 weeks ago and has no symptoms. She has some mild inflammation on Chest CT, but since asymptomatic and completed RT will re-initiate IO.  - Now s/p 3 cycles of 9LA. With persistent IO induced rash and continued pneumonitis on recent imaging, plan to hold IO and proceed with surveillance.  - 10/16/23 CT showing continued evidence of inflammation/infection. Currently has cold symptoms. +Covid.   - Repeat CT scan (preliminary reading) shows improvement in inflammation and no evidence of disease progression. Repeat imaging in 3 months.      4.  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      5-8.  Stable, follows with cardiology. AICD placed, but patient now has recovered EF and only takes lasix prn.  NYHA class I.    9. Continue clobetasol PRN.    10. Hem/Onc psych referral placed. Continue management of antidepressants/anxiolytics with PCP.     Patient was also seen and examined by Dr. Avina. Patient is in agreement with the proposed treatment plan. All questions were answered to the patient's satisfaction. Pt knows to call clinic if anything is needed before the next clinic visit.    Aide Magaña, MSN, APRN, FNP-C  Hematology and Medical Oncology  Nurse Practitioner  to Dr. Javi Avina  Nurse Practitioner, [facility]      I have reviewed the notes, assessments, and/or procedures performed by Aide MONTELONGO, as above.  I have personally interviewed and examined the patient at the beside, and rounded with Aide. I concur with her assessment and plan and the documentation of Hannah Montoya.    MDM includes:    - Acute or chronic illness or injury that poses a threat to life or bodily function  - Independent review and explanation of 2 results from unique tests  - Discussion of management and ordering 2 unique tests  - Extensive discussion of treatment and management  - Prescription drug management  - Drug therapy requiring intensive monitoring for toxicity    Javi Avina M.D.  Hematology/Oncology Attending  Director [facility]  [facility]          Route Chart for Scheduling    Med Onc Chart Routing      Follow up with physician 3 months. with CT scans and labs a few days prior   Follow up with DANIEL    Infusion scheduling note    Injection scheduling note    Labs CBC and CMP   Scheduling:  Preferred lab:  Lab interval:     Imaging CT chest abdomen pelvis      Pharmacy appointment    Other referrals          Treatment Plan Information   OP NSCLC NIVOLUMAB 360 MG D1 & D22 WITH IPILIMUMAB 1 MG/KG Q6W PLUS CARBOPLATIN (AUC) PACLITAXEL Q3W X2 DOSES   Javi Avina MD   Upcoming Treatment Dates - OP NSCLC NIVOLUMAB 360 MG D1 & D22 WITH IPILIMUMAB 1 MG/KG Q6W PLUS CARBOPLATIN (AUC) PACLITAXEL Q3W X2 DOSES    8/14/2023       Immunotherapy       nivolumab 360 mg in sodium chloride 0.9% 86 mL infusion  9/4/2023       Immunotherapy       nivolumab 360 mg in sodium chloride 0.9% 86 mL infusion       ipilimumab (YERVOY) 1 mg/kg = 81 mg in sodium chloride 0.9% 66.2 mL chemo infusion  9/25/2023       Immunotherapy       nivolumab 360 mg in sodium chloride 0.9% 86 mL infusion  10/16/2023       Immunotherapy        nivolumab 360 mg in sodium chloride 0.9% 86 mL infusion       ipilimumab (YERVOY) 1 mg/kg = 81 mg in sodium chloride 0.9% 66.2 mL chemo infusion

## 2023-12-05 ENCOUNTER — TELEPHONE (OUTPATIENT)
Dept: FAMILY MEDICINE | Facility: CLINIC | Age: 68
End: 2023-12-05
Payer: MEDICARE

## 2023-12-06 ENCOUNTER — OFFICE VISIT (OUTPATIENT)
Dept: OPTOMETRY | Facility: CLINIC | Age: 68
End: 2023-12-06
Payer: MEDICARE

## 2023-12-06 ENCOUNTER — OFFICE VISIT (OUTPATIENT)
Dept: FAMILY MEDICINE | Facility: CLINIC | Age: 68
End: 2023-12-06
Payer: MEDICARE

## 2023-12-06 ENCOUNTER — PATIENT MESSAGE (OUTPATIENT)
Dept: OTHER | Facility: OTHER | Age: 68
End: 2023-12-06
Payer: MEDICARE

## 2023-12-06 ENCOUNTER — LAB VISIT (OUTPATIENT)
Dept: LAB | Facility: HOSPITAL | Age: 68
End: 2023-12-06
Payer: MEDICARE

## 2023-12-06 VITALS
SYSTOLIC BLOOD PRESSURE: 134 MMHG | HEIGHT: 62 IN | TEMPERATURE: 98 F | RESPIRATION RATE: 18 BRPM | OXYGEN SATURATION: 97 % | DIASTOLIC BLOOD PRESSURE: 60 MMHG | BODY MASS INDEX: 32.14 KG/M2 | HEART RATE: 88 BPM | WEIGHT: 174.63 LBS

## 2023-12-06 DIAGNOSIS — E11.9 TYPE 2 DIABETES MELLITUS WITHOUT COMPLICATION, WITHOUT LONG-TERM CURRENT USE OF INSULIN: ICD-10-CM

## 2023-12-06 DIAGNOSIS — I10 ESSENTIAL HYPERTENSION: ICD-10-CM

## 2023-12-06 DIAGNOSIS — C34.91 NSCLC OF RIGHT LUNG: ICD-10-CM

## 2023-12-06 DIAGNOSIS — E11.36 TYPE 2 DIABETES MELLITUS WITH DIABETIC CATARACT, WITHOUT LONG-TERM CURRENT USE OF INSULIN: Primary | ICD-10-CM

## 2023-12-06 DIAGNOSIS — H25.13 NUCLEAR SCLEROSIS OF BOTH EYES: ICD-10-CM

## 2023-12-06 DIAGNOSIS — F33.1 MODERATE EPISODE OF RECURRENT MAJOR DEPRESSIVE DISORDER: ICD-10-CM

## 2023-12-06 DIAGNOSIS — H52.7 REFRACTIVE ERROR: ICD-10-CM

## 2023-12-06 DIAGNOSIS — E66.09 CLASS 1 OBESITY DUE TO EXCESS CALORIES WITH SERIOUS COMORBIDITY AND BODY MASS INDEX (BMI) OF 31.0 TO 31.9 IN ADULT: ICD-10-CM

## 2023-12-06 DIAGNOSIS — Z00.00 WELL WOMAN EXAM WITHOUT GYNECOLOGICAL EXAM: Primary | ICD-10-CM

## 2023-12-06 PROBLEM — K08.89 PAIN, DENTAL: Status: RESOLVED | Noted: 2019-03-17 | Resolved: 2023-12-06

## 2023-12-06 PROBLEM — F32.9 REACTIVE DEPRESSION: Status: RESOLVED | Noted: 2020-09-04 | Resolved: 2023-12-06

## 2023-12-06 LAB
ESTIMATED AVG GLUCOSE: 108 MG/DL (ref 68–131)
HBA1C MFR BLD: 5.4 % (ref 4–5.6)

## 2023-12-06 PROCEDURE — 1101F PR PT FALLS ASSESS DOC 0-1 FALLS W/OUT INJ PAST YR: ICD-10-PCS | Mod: HCNC,CPTII,S$GLB, | Performed by: OPTOMETRIST

## 2023-12-06 PROCEDURE — 1157F ADVNC CARE PLAN IN RCRD: CPT | Mod: HCNC,CPTII,S$GLB, | Performed by: OPTOMETRIST

## 2023-12-06 PROCEDURE — 92014 COMPRE OPH EXAM EST PT 1/>: CPT | Mod: HCNC,S$GLB,, | Performed by: OPTOMETRIST

## 2023-12-06 PROCEDURE — 3062F POS MACROALBUMINURIA REV: CPT | Mod: HCNC,CPTII,S$GLB, | Performed by: FAMILY MEDICINE

## 2023-12-06 PROCEDURE — 1159F MED LIST DOCD IN RCRD: CPT | Mod: HCNC,CPTII,S$GLB, | Performed by: FAMILY MEDICINE

## 2023-12-06 PROCEDURE — 3288F FALL RISK ASSESSMENT DOCD: CPT | Mod: HCNC,CPTII,S$GLB, | Performed by: OPTOMETRIST

## 2023-12-06 PROCEDURE — 1160F PR REVIEW ALL MEDS BY PRESCRIBER/CLIN PHARMACIST DOCUMENTED: ICD-10-PCS | Mod: HCNC,CPTII,S$GLB, | Performed by: FAMILY MEDICINE

## 2023-12-06 PROCEDURE — 3066F NEPHROPATHY DOC TX: CPT | Mod: HCNC,CPTII,S$GLB, | Performed by: FAMILY MEDICINE

## 2023-12-06 PROCEDURE — 1157F PR ADVANCE CARE PLAN OR EQUIV PRESENT IN MEDICAL RECORD: ICD-10-PCS | Mod: HCNC,CPTII,S$GLB, | Performed by: OPTOMETRIST

## 2023-12-06 PROCEDURE — 1160F RVW MEDS BY RX/DR IN RCRD: CPT | Mod: HCNC,CPTII,S$GLB, | Performed by: FAMILY MEDICINE

## 2023-12-06 PROCEDURE — 99397 PR PREVENTIVE VISIT,EST,65 & OVER: ICD-10-PCS | Mod: HCNC,S$GLB,, | Performed by: FAMILY MEDICINE

## 2023-12-06 PROCEDURE — 1159F PR MEDICATION LIST DOCUMENTED IN MEDICAL RECORD: ICD-10-PCS | Mod: HCNC,CPTII,S$GLB, | Performed by: FAMILY MEDICINE

## 2023-12-06 PROCEDURE — 3075F PR MOST RECENT SYSTOLIC BLOOD PRESS GE 130-139MM HG: ICD-10-PCS | Mod: HCNC,CPTII,S$GLB, | Performed by: FAMILY MEDICINE

## 2023-12-06 PROCEDURE — 3066F NEPHROPATHY DOC TX: CPT | Mod: HCNC,CPTII,S$GLB, | Performed by: OPTOMETRIST

## 2023-12-06 PROCEDURE — 3044F PR MOST RECENT HEMOGLOBIN A1C LEVEL <7.0%: ICD-10-PCS | Mod: HCNC,CPTII,S$GLB, | Performed by: FAMILY MEDICINE

## 2023-12-06 PROCEDURE — 3044F HG A1C LEVEL LT 7.0%: CPT | Mod: HCNC,CPTII,S$GLB, | Performed by: OPTOMETRIST

## 2023-12-06 PROCEDURE — 3288F PR FALLS RISK ASSESSMENT DOCUMENTED: ICD-10-PCS | Mod: HCNC,CPTII,S$GLB, | Performed by: OPTOMETRIST

## 2023-12-06 PROCEDURE — 99999 PR PBB SHADOW E&M-EST. PATIENT-LVL V: ICD-10-PCS | Mod: PBBFAC,HCNC,, | Performed by: FAMILY MEDICINE

## 2023-12-06 PROCEDURE — 3044F HG A1C LEVEL LT 7.0%: CPT | Mod: HCNC,CPTII,S$GLB, | Performed by: FAMILY MEDICINE

## 2023-12-06 PROCEDURE — 1126F AMNT PAIN NOTED NONE PRSNT: CPT | Mod: HCNC,CPTII,S$GLB, | Performed by: OPTOMETRIST

## 2023-12-06 PROCEDURE — 3008F BODY MASS INDEX DOCD: CPT | Mod: HCNC,CPTII,S$GLB, | Performed by: FAMILY MEDICINE

## 2023-12-06 PROCEDURE — 1160F PR REVIEW ALL MEDS BY PRESCRIBER/CLIN PHARMACIST DOCUMENTED: ICD-10-PCS | Mod: HCNC,CPTII,S$GLB, | Performed by: OPTOMETRIST

## 2023-12-06 PROCEDURE — 1126F PR PAIN SEVERITY QUANTIFIED, NO PAIN PRESENT: ICD-10-PCS | Mod: HCNC,CPTII,S$GLB, | Performed by: OPTOMETRIST

## 2023-12-06 PROCEDURE — 2023F PR DILATED RETINAL EXAM W/O EVID OF RETINOPATHY: ICD-10-PCS | Mod: HCNC,CPTII,S$GLB, | Performed by: OPTOMETRIST

## 2023-12-06 PROCEDURE — 1160F RVW MEDS BY RX/DR IN RCRD: CPT | Mod: HCNC,CPTII,S$GLB, | Performed by: OPTOMETRIST

## 2023-12-06 PROCEDURE — 3288F FALL RISK ASSESSMENT DOCD: CPT | Mod: HCNC,CPTII,S$GLB, | Performed by: FAMILY MEDICINE

## 2023-12-06 PROCEDURE — 3044F PR MOST RECENT HEMOGLOBIN A1C LEVEL <7.0%: ICD-10-PCS | Mod: HCNC,CPTII,S$GLB, | Performed by: OPTOMETRIST

## 2023-12-06 PROCEDURE — 1159F MED LIST DOCD IN RCRD: CPT | Mod: HCNC,CPTII,S$GLB, | Performed by: OPTOMETRIST

## 2023-12-06 PROCEDURE — 92014 PR EYE EXAM, EST PATIENT,COMPREHESV: ICD-10-PCS | Mod: HCNC,S$GLB,, | Performed by: OPTOMETRIST

## 2023-12-06 PROCEDURE — 3288F PR FALLS RISK ASSESSMENT DOCUMENTED: ICD-10-PCS | Mod: HCNC,CPTII,S$GLB, | Performed by: FAMILY MEDICINE

## 2023-12-06 PROCEDURE — 3078F DIAST BP <80 MM HG: CPT | Mod: HCNC,CPTII,S$GLB, | Performed by: FAMILY MEDICINE

## 2023-12-06 PROCEDURE — 4010F ACE/ARB THERAPY RXD/TAKEN: CPT | Mod: HCNC,CPTII,S$GLB, | Performed by: OPTOMETRIST

## 2023-12-06 PROCEDURE — 3078F PR MOST RECENT DIASTOLIC BLOOD PRESSURE < 80 MM HG: ICD-10-PCS | Mod: HCNC,CPTII,S$GLB, | Performed by: FAMILY MEDICINE

## 2023-12-06 PROCEDURE — 4010F ACE/ARB THERAPY RXD/TAKEN: CPT | Mod: HCNC,CPTII,S$GLB, | Performed by: FAMILY MEDICINE

## 2023-12-06 PROCEDURE — 1101F PR PT FALLS ASSESS DOC 0-1 FALLS W/OUT INJ PAST YR: ICD-10-PCS | Mod: HCNC,CPTII,S$GLB, | Performed by: FAMILY MEDICINE

## 2023-12-06 PROCEDURE — 3062F POS MACROALBUMINURIA REV: CPT | Mod: HCNC,CPTII,S$GLB, | Performed by: OPTOMETRIST

## 2023-12-06 PROCEDURE — 4010F PR ACE/ARB THEARPY RXD/TAKEN: ICD-10-PCS | Mod: HCNC,CPTII,S$GLB, | Performed by: FAMILY MEDICINE

## 2023-12-06 PROCEDURE — 3062F PR POS MACROALBUMINURIA RESULT DOCUMENTED/REVIEW: ICD-10-PCS | Mod: HCNC,CPTII,S$GLB, | Performed by: FAMILY MEDICINE

## 2023-12-06 PROCEDURE — 1157F PR ADVANCE CARE PLAN OR EQUIV PRESENT IN MEDICAL RECORD: ICD-10-PCS | Mod: HCNC,CPTII,S$GLB, | Performed by: FAMILY MEDICINE

## 2023-12-06 PROCEDURE — 3062F PR POS MACROALBUMINURIA RESULT DOCUMENTED/REVIEW: ICD-10-PCS | Mod: HCNC,CPTII,S$GLB, | Performed by: OPTOMETRIST

## 2023-12-06 PROCEDURE — 83036 HEMOGLOBIN GLYCOSYLATED A1C: CPT | Mod: HCNC | Performed by: FAMILY MEDICINE

## 2023-12-06 PROCEDURE — 3066F PR DOCUMENTATION OF TREATMENT FOR NEPHROPATHY: ICD-10-PCS | Mod: HCNC,CPTII,S$GLB, | Performed by: FAMILY MEDICINE

## 2023-12-06 PROCEDURE — 99999 PR PBB SHADOW E&M-EST. PATIENT-LVL I: ICD-10-PCS | Mod: PBBFAC,HCNC,, | Performed by: OPTOMETRIST

## 2023-12-06 PROCEDURE — 36415 COLL VENOUS BLD VENIPUNCTURE: CPT | Mod: HCNC,PO | Performed by: FAMILY MEDICINE

## 2023-12-06 PROCEDURE — 99999 PR PBB SHADOW E&M-EST. PATIENT-LVL I: CPT | Mod: PBBFAC,HCNC,, | Performed by: OPTOMETRIST

## 2023-12-06 PROCEDURE — 4010F PR ACE/ARB THEARPY RXD/TAKEN: ICD-10-PCS | Mod: HCNC,CPTII,S$GLB, | Performed by: OPTOMETRIST

## 2023-12-06 PROCEDURE — 1101F PT FALLS ASSESS-DOCD LE1/YR: CPT | Mod: HCNC,CPTII,S$GLB, | Performed by: FAMILY MEDICINE

## 2023-12-06 PROCEDURE — 1126F PR PAIN SEVERITY QUANTIFIED, NO PAIN PRESENT: ICD-10-PCS | Mod: HCNC,CPTII,S$GLB, | Performed by: FAMILY MEDICINE

## 2023-12-06 PROCEDURE — 99999 PR PBB SHADOW E&M-EST. PATIENT-LVL V: CPT | Mod: PBBFAC,HCNC,, | Performed by: FAMILY MEDICINE

## 2023-12-06 PROCEDURE — 99397 PER PM REEVAL EST PAT 65+ YR: CPT | Mod: HCNC,S$GLB,, | Performed by: FAMILY MEDICINE

## 2023-12-06 PROCEDURE — 3008F PR BODY MASS INDEX (BMI) DOCUMENTED: ICD-10-PCS | Mod: HCNC,CPTII,S$GLB, | Performed by: FAMILY MEDICINE

## 2023-12-06 PROCEDURE — 1101F PT FALLS ASSESS-DOCD LE1/YR: CPT | Mod: HCNC,CPTII,S$GLB, | Performed by: OPTOMETRIST

## 2023-12-06 PROCEDURE — 1157F ADVNC CARE PLAN IN RCRD: CPT | Mod: HCNC,CPTII,S$GLB, | Performed by: FAMILY MEDICINE

## 2023-12-06 PROCEDURE — 1126F AMNT PAIN NOTED NONE PRSNT: CPT | Mod: HCNC,CPTII,S$GLB, | Performed by: FAMILY MEDICINE

## 2023-12-06 PROCEDURE — 1159F PR MEDICATION LIST DOCUMENTED IN MEDICAL RECORD: ICD-10-PCS | Mod: HCNC,CPTII,S$GLB, | Performed by: OPTOMETRIST

## 2023-12-06 PROCEDURE — 2023F DILAT RTA XM W/O RTNOPTHY: CPT | Mod: HCNC,CPTII,S$GLB, | Performed by: OPTOMETRIST

## 2023-12-06 PROCEDURE — 3066F PR DOCUMENTATION OF TREATMENT FOR NEPHROPATHY: ICD-10-PCS | Mod: HCNC,CPTII,S$GLB, | Performed by: OPTOMETRIST

## 2023-12-06 PROCEDURE — 3075F SYST BP GE 130 - 139MM HG: CPT | Mod: HCNC,CPTII,S$GLB, | Performed by: FAMILY MEDICINE

## 2023-12-06 RX ORDER — INFLUENZA A VIRUS A/VICTORIA/4897/2022 IVR-238 (H1N1) ANTIGEN (FORMALDEHYDE INACTIVATED), INFLUENZA A VIRUS A/DARWIN/6/2021 IVR-227 (H3N2) ANTIGEN (FORMALDEHYDE INACTIVATED), INFLUENZA B VIRUS B/AUSTRIA/1359417/2021 BVR-26 ANTIGEN (FORMALDEHYDE INACTIVATED), INFLUENZA B VIRUS B/PHUKET/3073/2013 BVR-1B ANTIGEN (FORMALDEHYDE INACTIVATED) 15; 15; 15; 15 UG/.5ML; UG/.5ML; UG/.5ML; UG/.5ML
INJECTION, SUSPENSION INTRAMUSCULAR
COMMUNITY
Start: 2023-10-03

## 2023-12-06 NOTE — PROGRESS NOTES
Subjective:       Patient ID: Hannah Montoya is a 68 y.o. female      Chief Complaint   Patient presents with    Concerns About Ocular Health    Diabetic Eye Exam     History of Present Illness  Dls: 9/30/21 Dr. Juarez     67 y/o female presents today for diabetic eye exam.   Pt states no changes in vision. Pt wears single vision glasses for   distance.     today     No tearing  No itching  No burning  No pain  No ha's  No floaters  No flashes    Eye meds  None    Pohx:   None    Fohx:   Cat - mother , father   MD- grandmother    Hemoglobin A1C       Date                     Value               Ref Range             Status                05/25/2023               5.8 (H)             4.0 - 5.6 %           Final                  03/08/2023               8.1 (H)             4.0 - 5.6 %           Final                   06/30/2022               6.5 (H)             4.0 - 5.6 %           Final                   Assessment/Plan:     1. Type 2 diabetes mellitus with diabetic cataract, without long-term current use of insulin  No diabetic retinopathy. Discussed with pt the effects of diabetes on vision, importance of good blood sugar control, compliance with meds, and follow up care with PCP. Return in 1 year for dilated eye exam, sooner PRN.    2. Nuclear sclerosis of both eyes  Educated pt on presence of cataracts and effects on vision. No surgery at this time. Recheck in one year, sooner PRN.    3. Refractive error  Declined MRx  Continue current spectacles.     Follow up in about 1 year (around 12/6/2024) for Diabetic Eye Exam.

## 2023-12-06 NOTE — PROGRESS NOTES
"Well Woman VISIT      CHIEF COMPLAINT  Chief Complaint   Patient presents with    Follow-up    Hypertension    Diabetes       HPI  Hannah Montoya is a 68 y.o. female who presents for physical.     Social Factors  Tobacco use: No  Ready to Quit: No  Alcohol: No  Intimate partner violence screening  "Do you feel safe in your current relationship?" Yes   "Have you ever been in a relationship in which your partner frightened you or hurt you?" No  Living Will/POA: No  Regular Exercise: No    Depression  Over the past two weeks, have you felt down, depressed, or hopeless? felt down because of sons health  Over the past two weeks, have you felt little interest or pleasure in doing things? No    Reproductive Health  Followed by OBGYN    CHD, HTN, DM2  CHD Risk Factors: diabetes mellitus, hypertension, obesity (BMI >= 30 kg/m2) and smoking/ tobacco exposure  Women 45 years and older should be screened for dyslipidemia if at increased risk of CHD  Women 20 to 45 years of age should be screened for dyslipidemia if at increased risk of CHD  Asymptomatic adults with sustained blood pressure greater than 135/80 mm Hg (treated or untreated) should be screened for type 2 diabetes mellitus    Estimated body mass index is 31.94 kg/m² as calculated from the following:    Height as of this encounter: 5' 2" (1.575 m).    Weight as of this encounter: 79.2 kg (174 lb 9.7 oz).      Screening  Mammogram needed: ordered  Colonoscopy needed: due in March 2020  Osteoporosis screen needed: utd     Women 50 to 74 years of age should be screened for breast cancer with mammography biennially.  Women should be screened for cervical cancer with Pap tests beginning at 21 years of age. Low-risk women should receive Pap testing every three years. Co-testing for human papillomavirus is an option beginning at 30 years of age, and can extend the screening interval to five years. Cervical cancer screening should be discontinued at 65 years of " "age or after total hysterectomy if the woman has a benign gynecologic history  Adults 50 to 75 years of age should be screened for colorectal cancer with an FOBT annually, sigmoidoscopy every five years with an FOBT every three years, or colonoscopy every 10 years.  Women 65 years and older should be screened for osteoporosis. Women younger than 65 years should be screened if the risk of fracture is greater than or equal to that of a 65-year-old white woman without additional risk factors.      Immunizations  delayed    ALLERGIES and MEDS were verified.   PMHx, PSHx, FHx, SOCIALHx were updated as pertinent.    REVIEW OF SYSTEMS  Review of Systems   Constitutional: Negative.    HENT: Negative.     Eyes:  Negative for discharge.   Respiratory:  Negative for wheezing.    Cardiovascular:  Negative for chest pain and palpitations.   Gastrointestinal:  Negative for blood in stool, constipation, diarrhea and vomiting.   Genitourinary:  Negative for hematuria.   Musculoskeletal: Negative.    Skin: Negative.    Neurological:  Negative for headaches.   Endo/Heme/Allergies:  Negative for polydipsia.         PHYSICAL EXAM  VITAL SIGNS: /60   Pulse 88   Temp 98.1 °F (36.7 °C) (Oral)   Resp 18   Ht 5' 2" (1.575 m)   Wt 79.2 kg (174 lb 9.7 oz)   LMP  (LMP Unknown)   SpO2 97%   BMI 31.94 kg/m²   GEN: Well developed, Well nourished, No acute distress.  HENT: Normocephalic, Atraumatic, Bilateral external ears normal, Nose normal, Oropharynx moist, No oral exudates.   Eyes: PERRL, EOMI, Conjunctiva normal, No discharge.   Neck: Supple, No tenderness.  Lymphatic: No cervical or supraclavicular lymphadenopathy noted.   Cardiovascular: Normal heart rate, Normal rhythm, No murmurs, No rubs, No gallops.   Thorax & Lungs: Normal breath sounds, No respiratory distress, No wheezing.  Abdomen: Soft, No tenderness, Bowel sounds normal.  Breast:deferred  Genital: deferreed   Skin: Warm, Dry, No erythema, No rash.   Extremities: No " edema, No tenderness.       ASSESSMENT/PLAN    Hannah was seen today for follow-up, hypertension and diabetes.    Diagnoses and all orders for this visit:    Well woman exam without gynecological exam  - Labs up to date    Essential hypertension  - Stable on current regimen  - No chest pain, weakness, numbness, or slurred speech    Type 2 diabetes mellitus without complication, without long-term current use of insulin  - Last A1c was stable  - Followed by endocrinology    Moderate episode of recurrent major depressive disorder  - Stable on current regimen  - States she is stressed about her sons health, but also knows there is nothing she can do at this time    NSCLC of right lung  - Currently undergoing treatment and tolerating well  - Managed by oncology    Class 1 obesity due to excess calories with serious comorbidity and body mass index (BMI) of 31.0 to 31.9 in adult  - Patient weight is stable  - She states that ozempic is keeping appetite well controlled           FOLLOW UP: 6 months or sooner if needed      Emanuel Chavez MD

## 2023-12-14 ENCOUNTER — LAB VISIT (OUTPATIENT)
Dept: LAB | Facility: HOSPITAL | Age: 68
End: 2023-12-14
Attending: INTERNAL MEDICINE
Payer: MEDICARE

## 2023-12-14 DIAGNOSIS — C34.91 NSCLC OF RIGHT LUNG: ICD-10-CM

## 2023-12-14 DIAGNOSIS — E03.9 HYPOTHYROIDISM, UNSPECIFIED TYPE: ICD-10-CM

## 2023-12-14 LAB
ALBUMIN SERPL BCP-MCNC: 3.3 G/DL (ref 3.5–5.2)
ALP SERPL-CCNC: 56 U/L (ref 55–135)
ALT SERPL W/O P-5'-P-CCNC: 17 U/L (ref 10–44)
ANION GAP SERPL CALC-SCNC: 10 MMOL/L (ref 8–16)
AST SERPL-CCNC: 19 U/L (ref 10–40)
BASOPHILS # BLD AUTO: 0.02 K/UL (ref 0–0.2)
BASOPHILS NFR BLD: 0.4 % (ref 0–1.9)
BILIRUB SERPL-MCNC: 0.4 MG/DL (ref 0.1–1)
BUN SERPL-MCNC: 27 MG/DL (ref 8–23)
CALCIUM SERPL-MCNC: 10.1 MG/DL (ref 8.7–10.5)
CHLORIDE SERPL-SCNC: 107 MMOL/L (ref 95–110)
CO2 SERPL-SCNC: 26 MMOL/L (ref 23–29)
CREAT SERPL-MCNC: 1.2 MG/DL (ref 0.5–1.4)
DIFFERENTIAL METHOD: ABNORMAL
EOSINOPHIL # BLD AUTO: 0.1 K/UL (ref 0–0.5)
EOSINOPHIL NFR BLD: 1.4 % (ref 0–8)
ERYTHROCYTE [DISTWIDTH] IN BLOOD BY AUTOMATED COUNT: 16.4 % (ref 11.5–14.5)
EST. GFR  (NO RACE VARIABLE): 49.9 ML/MIN/1.73 M^2
GLUCOSE SERPL-MCNC: 125 MG/DL (ref 70–110)
HCT VFR BLD AUTO: 36 % (ref 37–48.5)
HGB BLD-MCNC: 11.5 G/DL (ref 12–16)
IMM GRANULOCYTES # BLD AUTO: 0.07 K/UL (ref 0–0.04)
IMM GRANULOCYTES NFR BLD AUTO: 1.2 % (ref 0–0.5)
LYMPHOCYTES # BLD AUTO: 0.7 K/UL (ref 1–4.8)
LYMPHOCYTES NFR BLD: 12.1 % (ref 18–48)
MCH RBC QN AUTO: 26.2 PG (ref 27–31)
MCHC RBC AUTO-ENTMCNC: 31.9 G/DL (ref 32–36)
MCV RBC AUTO: 82 FL (ref 82–98)
MONOCYTES # BLD AUTO: 0.5 K/UL (ref 0.3–1)
MONOCYTES NFR BLD: 9.4 % (ref 4–15)
NEUTROPHILS # BLD AUTO: 4.3 K/UL (ref 1.8–7.7)
NEUTROPHILS NFR BLD: 75.5 % (ref 38–73)
NRBC BLD-RTO: 0 /100 WBC
PLATELET # BLD AUTO: 214 K/UL (ref 150–450)
PMV BLD AUTO: 12.4 FL (ref 9.2–12.9)
POTASSIUM SERPL-SCNC: 4.3 MMOL/L (ref 3.5–5.1)
PROT SERPL-MCNC: 7.5 G/DL (ref 6–8.4)
RBC # BLD AUTO: 4.39 M/UL (ref 4–5.4)
SODIUM SERPL-SCNC: 143 MMOL/L (ref 136–145)
WBC # BLD AUTO: 5.64 K/UL (ref 3.9–12.7)

## 2023-12-14 PROCEDURE — 36415 COLL VENOUS BLD VENIPUNCTURE: CPT | Mod: HCNC | Performed by: INTERNAL MEDICINE

## 2023-12-14 PROCEDURE — 80053 COMPREHEN METABOLIC PANEL: CPT | Mod: HCNC | Performed by: INTERNAL MEDICINE

## 2023-12-14 PROCEDURE — 85025 COMPLETE CBC W/AUTO DIFF WBC: CPT | Mod: HCNC | Performed by: INTERNAL MEDICINE

## 2023-12-19 DIAGNOSIS — F32.9 REACTIVE DEPRESSION: ICD-10-CM

## 2023-12-19 RX ORDER — BUPROPION HYDROCHLORIDE 150 MG/1
150 TABLET ORAL
Qty: 90 TABLET | Refills: 3 | Status: SHIPPED | OUTPATIENT
Start: 2023-12-19

## 2023-12-20 NOTE — TELEPHONE ENCOUNTER
Refill Decision Note   Hannah Lindsay  is requesting a refill authorization.  Brief Assessment and Rationale for Refill:  Approve     Medication Therapy Plan:         Comments:     Note composed:11:09 PM 12/19/2023

## 2023-12-20 NOTE — TELEPHONE ENCOUNTER
No care due was identified.  Health [facility] Embedded Care Due Messages. Reference number: [id number].   12/19/2023 11:05:58 PM CST

## 2024-01-03 DIAGNOSIS — I10 BENIGN ESSENTIAL HTN: ICD-10-CM

## 2024-01-03 DIAGNOSIS — E11.9 TYPE 2 DIABETES MELLITUS WITHOUT COMPLICATION, WITHOUT LONG-TERM CURRENT USE OF INSULIN: ICD-10-CM

## 2024-01-03 DIAGNOSIS — T66.XXXA RADIATION-INDUCED ESOPHAGITIS: ICD-10-CM

## 2024-01-03 DIAGNOSIS — K20.80 RADIATION-INDUCED ESOPHAGITIS: ICD-10-CM

## 2024-01-03 RX ORDER — PANTOPRAZOLE SODIUM 40 MG/1
40 TABLET, DELAYED RELEASE ORAL
Qty: 90 TABLET | Refills: 3 | Status: SHIPPED | OUTPATIENT
Start: 2024-01-03

## 2024-01-03 RX ORDER — ATORVASTATIN CALCIUM 10 MG/1
10 TABLET, FILM COATED ORAL
Qty: 90 TABLET | Refills: 1 | Status: SHIPPED | OUTPATIENT
Start: 2024-01-03

## 2024-01-03 RX ORDER — LOSARTAN POTASSIUM 100 MG/1
100 TABLET ORAL
Qty: 90 TABLET | Refills: 3 | Status: SHIPPED | OUTPATIENT
Start: 2024-01-03

## 2024-01-03 RX ORDER — METOPROLOL SUCCINATE 100 MG/1
100 TABLET, EXTENDED RELEASE ORAL
Qty: 90 TABLET | Refills: 3 | Status: SHIPPED | OUTPATIENT
Start: 2024-01-03

## 2024-01-03 NOTE — TELEPHONE ENCOUNTER
Refill Decision Note   Hannah Montoya  is requesting a refill authorization.  Brief Assessment and Rationale for Refill:  Approve     Medication Therapy Plan:         Comments:     Note composed:2:24 PM 01/03/2024

## 2024-01-03 NOTE — TELEPHONE ENCOUNTER
No care due was identified.  Health [facility] Embedded Care Due Messages. Reference number: [id number].   1/03/2024 12:36:00 AM CST

## 2024-01-06 ENCOUNTER — PATIENT MESSAGE (OUTPATIENT)
Dept: RADIATION ONCOLOGY | Facility: CLINIC | Age: 69
End: 2024-01-06
Payer: MEDICARE

## 2024-01-25 ENCOUNTER — PATIENT MESSAGE (OUTPATIENT)
Dept: ADMINISTRATIVE | Facility: OTHER | Age: 69
End: 2024-01-25
Payer: MEDICARE

## 2024-01-26 DIAGNOSIS — J41.0 SIMPLE CHRONIC BRONCHITIS: ICD-10-CM

## 2024-01-26 RX ORDER — TIOTROPIUM BROMIDE 18 UG/1
CAPSULE ORAL; RESPIRATORY (INHALATION)
Qty: 90 CAPSULE | Refills: 3 | Status: SHIPPED | OUTPATIENT
Start: 2024-01-26

## 2024-01-26 NOTE — TELEPHONE ENCOUNTER
Hannah Montoya  is requesting a refill authorization.  Brief Assessment and Rationale for Refill:  Approve     Medication Therapy Plan:         Comments:     Note composed:1:52 AM 01/26/2024

## 2024-01-26 NOTE — TELEPHONE ENCOUNTER
No care due was identified.  Health [facility] Embedded Care Due Messages. Reference number: [id number].   1/26/2024 12:40:01 AM CST

## 2024-02-08 ENCOUNTER — CLINICAL SUPPORT (OUTPATIENT)
Dept: CARDIOLOGY | Facility: HOSPITAL | Age: 69
End: 2024-02-08
Attending: INTERNAL MEDICINE
Payer: MEDICARE

## 2024-02-08 ENCOUNTER — CLINICAL SUPPORT (OUTPATIENT)
Dept: CARDIOLOGY | Facility: HOSPITAL | Age: 69
End: 2024-02-08
Payer: MEDICARE

## 2024-02-08 DIAGNOSIS — Z95.810 PRESENCE OF AUTOMATIC (IMPLANTABLE) CARDIAC DEFIBRILLATOR: ICD-10-CM

## 2024-02-08 PROCEDURE — 93295 DEV INTERROG REMOTE 1/2/MLT: CPT | Mod: HCNC,,, | Performed by: INTERNAL MEDICINE

## 2024-02-08 PROCEDURE — 93296 REM INTERROG EVL PM/IDS: CPT | Mod: HCNC | Performed by: INTERNAL MEDICINE

## 2024-02-14 ENCOUNTER — HOSPITAL ENCOUNTER (OUTPATIENT)
Dept: RADIOLOGY | Facility: HOSPITAL | Age: 69
Discharge: HOME | End: 2024-02-14
Attending: INTERNAL MEDICINE
Payer: MEDICARE

## 2024-02-14 DIAGNOSIS — C34.91 NSCLC OF RIGHT LUNG: ICD-10-CM

## 2024-02-14 PROCEDURE — 74177 CT ABD & PELVIS W/CONTRAST: CPT | Mod: 26,,, | Performed by: RADIOLOGY

## 2024-02-14 PROCEDURE — 74177 CT ABD & PELVIS W/CONTRAST: CPT | Mod: TC

## 2024-02-14 PROCEDURE — A9698 NON-RAD CONTRAST MATERIALNOC: HCPCS | Performed by: INTERNAL MEDICINE

## 2024-02-14 PROCEDURE — 71260 CT THORAX DX C+: CPT | Mod: 26,,, | Performed by: RADIOLOGY

## 2024-02-14 PROCEDURE — 25500020 PHARM REV CODE 255: Performed by: INTERNAL MEDICINE

## 2024-02-14 RX ADMIN — IOHEXOL 75 ML: 350 INJECTION, SOLUTION INTRAVENOUS at 12:02

## 2024-02-14 RX ADMIN — Medication 450 ML: at 12:02

## 2024-02-15 DIAGNOSIS — L73.9 FOLLICULITIS: ICD-10-CM

## 2024-02-15 RX ORDER — TRIAMCINOLONE ACETONIDE 1 MG/G
CREAM TOPICAL 2 TIMES DAILY
Qty: 454 G | Refills: 2 | Status: SHIPPED | OUTPATIENT
Start: 2024-02-15

## 2024-02-16 ENCOUNTER — OFFICE VISIT (OUTPATIENT)
Dept: RADIATION ONCOLOGY | Facility: CLINIC | Age: 69
End: 2024-02-16
Payer: MEDICARE

## 2024-02-16 ENCOUNTER — PATIENT MESSAGE (OUTPATIENT)
Dept: ADMINISTRATIVE | Facility: OTHER | Age: 69
End: 2024-02-16
Payer: MEDICARE

## 2024-02-16 ENCOUNTER — OFFICE VISIT (OUTPATIENT)
Dept: HEMATOLOGY/ONCOLOGY | Facility: CLINIC | Age: 69
End: 2024-02-16
Payer: MEDICARE

## 2024-02-16 VITALS
OXYGEN SATURATION: 94 % | HEIGHT: 62 IN | TEMPERATURE: 98 F | SYSTOLIC BLOOD PRESSURE: 138 MMHG | WEIGHT: 181.19 LBS | HEART RATE: 90 BPM | BODY MASS INDEX: 33.34 KG/M2 | RESPIRATION RATE: 18 BRPM | DIASTOLIC BLOOD PRESSURE: 81 MMHG

## 2024-02-16 VITALS
DIASTOLIC BLOOD PRESSURE: 72 MMHG | WEIGHT: 183.88 LBS | OXYGEN SATURATION: 96 % | HEIGHT: 62 IN | SYSTOLIC BLOOD PRESSURE: 140 MMHG | BODY MASS INDEX: 33.84 KG/M2 | HEART RATE: 84 BPM

## 2024-02-16 DIAGNOSIS — Z85.3 HISTORY OF BREAST CANCER IN FEMALE: ICD-10-CM

## 2024-02-16 DIAGNOSIS — K76.9 LIVER DISEASE, UNSPECIFIED: ICD-10-CM

## 2024-02-16 DIAGNOSIS — C34.91 NSCLC OF RIGHT LUNG: Primary | ICD-10-CM

## 2024-02-16 DIAGNOSIS — I44.7 LEFT BUNDLE-BRANCH BLOCK: ICD-10-CM

## 2024-02-16 DIAGNOSIS — J70.0 RADIATION PNEUMONITIS: ICD-10-CM

## 2024-02-16 DIAGNOSIS — Z95.810 CARDIAC RESYNCHRONIZATION THERAPY DEFIBRILLATOR (CRT-D) IN PLACE: ICD-10-CM

## 2024-02-16 DIAGNOSIS — I42.0 DILATED CARDIOMYOPATHY: ICD-10-CM

## 2024-02-16 DIAGNOSIS — U07.1 COVID-19 VIRUS INFECTION: ICD-10-CM

## 2024-02-16 PROBLEM — Z79.899 DRUG-INDUCED IMMUNODEFICIENCY: Status: RESOLVED | Noted: 2023-06-03 | Resolved: 2024-02-16

## 2024-02-16 PROBLEM — D84.821 DRUG-INDUCED IMMUNODEFICIENCY: Status: RESOLVED | Noted: 2023-06-03 | Resolved: 2024-02-16

## 2024-02-16 PROCEDURE — 3075F SYST BP GE 130 - 139MM HG: CPT | Mod: HCNC,CPTII,S$GLB, | Performed by: INTERNAL MEDICINE

## 2024-02-16 PROCEDURE — 3288F FALL RISK ASSESSMENT DOCD: CPT | Mod: HCNC,CPTII,S$GLB, | Performed by: RADIOLOGY

## 2024-02-16 PROCEDURE — 1101F PT FALLS ASSESS-DOCD LE1/YR: CPT | Mod: HCNC,CPTII,S$GLB, | Performed by: INTERNAL MEDICINE

## 2024-02-16 PROCEDURE — 1101F PT FALLS ASSESS-DOCD LE1/YR: CPT | Mod: HCNC,CPTII,S$GLB, | Performed by: RADIOLOGY

## 2024-02-16 PROCEDURE — 1159F MED LIST DOCD IN RCRD: CPT | Mod: HCNC,CPTII,S$GLB, | Performed by: RADIOLOGY

## 2024-02-16 PROCEDURE — 99213 OFFICE O/P EST LOW 20 MIN: CPT | Mod: HCNC,S$GLB,, | Performed by: RADIOLOGY

## 2024-02-16 PROCEDURE — 3008F BODY MASS INDEX DOCD: CPT | Mod: HCNC,CPTII,S$GLB, | Performed by: RADIOLOGY

## 2024-02-16 PROCEDURE — 3072F LOW RISK FOR RETINOPATHY: CPT | Mod: HCNC,CPTII,S$GLB, | Performed by: INTERNAL MEDICINE

## 2024-02-16 PROCEDURE — 4010F ACE/ARB THERAPY RXD/TAKEN: CPT | Mod: HCNC,CPTII,S$GLB, | Performed by: INTERNAL MEDICINE

## 2024-02-16 PROCEDURE — 3008F BODY MASS INDEX DOCD: CPT | Mod: HCNC,CPTII,S$GLB, | Performed by: INTERNAL MEDICINE

## 2024-02-16 PROCEDURE — 3072F LOW RISK FOR RETINOPATHY: CPT | Mod: HCNC,CPTII,S$GLB, | Performed by: RADIOLOGY

## 2024-02-16 PROCEDURE — 1126F AMNT PAIN NOTED NONE PRSNT: CPT | Mod: HCNC,CPTII,S$GLB, | Performed by: RADIOLOGY

## 2024-02-16 PROCEDURE — 1157F ADVNC CARE PLAN IN RCRD: CPT | Mod: HCNC,CPTII,S$GLB, | Performed by: RADIOLOGY

## 2024-02-16 PROCEDURE — 3288F FALL RISK ASSESSMENT DOCD: CPT | Mod: HCNC,CPTII,S$GLB, | Performed by: INTERNAL MEDICINE

## 2024-02-16 PROCEDURE — 1157F ADVNC CARE PLAN IN RCRD: CPT | Mod: HCNC,CPTII,S$GLB, | Performed by: INTERNAL MEDICINE

## 2024-02-16 PROCEDURE — 99999 PR PBB SHADOW E&M-EST. PATIENT-LVL IV: CPT | Mod: PBBFAC,,, | Performed by: RADIOLOGY

## 2024-02-16 PROCEDURE — 3079F DIAST BP 80-89 MM HG: CPT | Mod: HCNC,CPTII,S$GLB, | Performed by: INTERNAL MEDICINE

## 2024-02-16 PROCEDURE — 99215 OFFICE O/P EST HI 40 MIN: CPT | Mod: HCNC,S$GLB,, | Performed by: INTERNAL MEDICINE

## 2024-02-16 PROCEDURE — 1125F AMNT PAIN NOTED PAIN PRSNT: CPT | Mod: HCNC,CPTII,S$GLB, | Performed by: INTERNAL MEDICINE

## 2024-02-16 PROCEDURE — 3077F SYST BP >= 140 MM HG: CPT | Mod: HCNC,CPTII,S$GLB, | Performed by: RADIOLOGY

## 2024-02-16 PROCEDURE — 99999 PR PBB SHADOW E&M-EST. PATIENT-LVL V: CPT | Mod: PBBFAC,,, | Performed by: INTERNAL MEDICINE

## 2024-02-16 PROCEDURE — 3078F DIAST BP <80 MM HG: CPT | Mod: HCNC,CPTII,S$GLB, | Performed by: RADIOLOGY

## 2024-02-16 PROCEDURE — 4010F ACE/ARB THERAPY RXD/TAKEN: CPT | Mod: HCNC,CPTII,S$GLB, | Performed by: RADIOLOGY

## 2024-02-16 NOTE — PROGRESS NOTES
"02/16/2024    Radiation Oncology Follow-Up Visit    Prior Radiation History:   Course 1:   Site  Technique  Energy  Dose/Fx (Gy)  #Fx  Total Dose (Gy)  Start Date  End Date  Elapsed Days    RLL Lung  VMAT  6X  2  30 / 30  60  11/17/2020 12/30/2020  43      Course 2:    Site  Technique  Energy  Dose/Fx (Gy)  #Fx  Total Dose (Gy)  Start Date  End Date  Elapsed Days    RLL Lung  RtLung  6X  4  15 / 15  60  6/12/2023 6/30/2023  18        Assessment   This is a 68 y.o. y/o female with Stage IIIA (cT3 cN1 M0) RLL NSCLC (squam) diagnosed on EBUS biopsy of 11R node 10/13/20. Resection would require bilobectomy, which her PFT's could not support. She enrolled in JA1689 "Randomized Phase III Trial of [id number] (Durvalumab) as Concurrent and Consolidative Therapy or Consolidative Therapy Alone for Unresectable Stage 3 NSCLC " and completed definitive chemo-RT to 60 Gy in 30 fx on 12/30/20. In 1/2023 she developed biopsy-confirmed recurrence in the RLL with no other evidence of disease on PET or CT Head w/ contrast. She was treated with combined immunotherapy. Re-staging CT C/A/P 4/27/23 without evidence of progressive or new disease. She completed definitive re-irradiation 60 Gy in 15 fx to RLL on 6/30/23.   +CIED    CT C/A/P 2/14/24 with stable RLL lung collapse following re-irradiation; other pulmonary infiltrates have improved or nearly resolved. Note was made a new hypodensity in the liver near the gallbladder fossa. Dr. Avina met with her earlier today and is planning to obtain PET/CT to further characterize.        Plan   1) I will see her back in 6 months.   2) Follow-up with Dr. Avina for continued systemic management.        Chief Complaint   Patient presents with    Lung Cancer       HPI: Overall has been doing well since I last saw her. Denies dyspnea or chest pain. For the past 2 weeks she has had some RUQ abdominal pain with radiation around the back. She attributes this to possibly pulling a muscle while " bending down into her washing machine. No N/V, constipation, or diarrhea.        Past Medical History:   Diagnosis Date    AICD (automatic cardioverter/defibrillator) present     Asthma     bronchitis in past    Breast cancer 2016    right    Cardiac pacemaker     Cardiomyopathy     COPD (chronic obstructive pulmonary disease)     Diabetes mellitus, type 2     Hyperglycemia     Hyperlipidemia     Hypertension     Malignant neoplasm of overlapping sites of female breast 2016    Nuclear sclerosis of both eyes 2020    Respiratory distress 3/12/2020       Past Surgical History:   Procedure Laterality Date    BIOPSY, WITH CT GUIDANCE Right 2023    Procedure: LUNG MASS BIOPSY, WITH CT GUIDANCE;  Surgeon: Yehuda Green MD;  Location: [facility] CATH LAB;  Service: Radiology;  Laterality: Right;    BREAST BIOPSY Right 2016    IDC    BREAST LUMPECTOMY Right     CARDIAC CATHETERIZATION Bilateral 2017    CARDIAC DEFIBRILLATOR PLACEMENT Left 08/10/2017    CARDIAC DEFIBRILLATOR PLACEMENT Left 2018     SECTION      x2    CHOLECYSTECTOMY      INSERTION OF TUNNELED CENTRAL VENOUS CATHETER (CVC) WITH SUBCUTANEOUS PORT Right 2020    Procedure: [id number], RIGHT;  Surgeon: Josefa Caceres MD;  Location: [address];  Service: General;  Laterality: Right;  Port-a-cath placed to R. IJ    LUNG BIOPSY N/A 2020    Procedure: BIOPSY, LUNG;  Surgeon: [facility] Diagnostic Provider;  Location: [facility] OR;  Service: Radiology;  Laterality: N/A;  8AM START  RN PREOP 2020---COVID NEGATIVE    NAVIGATIONAL BRONCHOSCOPY N/A 10/13/2020    Procedure: BRONCHOSCOPY, NAVIGATIONAL;  Surgeon: Jamilah Weaver MD;  Location: [facility] OR [address] [place] [address];  Service: Pulmonary;  Laterality: N/A;    REVISION OF IMPLANTABLE CARDIOVERTER-DEFIBRILLATOR (ICD) ELECTRODE LEAD PLACEMENT N/A 6/15/2018    Procedure: REVISION-LEAD-ICD;  Surgeon: Javy Gates MD;  Location: [facility] CATH LAB;  Service: Cardiology;   Laterality: N/A;  LBBB, Bi-V ICD HIS Ld rev, MDT, Choice, MB, 3 Prep    ROBOT-ASSISTED LAPAROSCOPIC LYMPHADENECTOMY USING DA SALVADOR XI Right 10/23/2020    Procedure: XI ROBOTIC LYMPHADENECTOMY;  Surgeon: Ramo Tucker MD;  Location: [facility] OR [address] [place] [address];  Service: Thoracic;  Laterality: Right;    TUBAL LIGATION         Social History     Tobacco Use    Smoking status: Former     Current packs/day: 0.00     Average packs/day: 0.5 packs/day for 40.0 years (20.0 ttl pk-yrs)     Types: Cigarettes     Start date: 1976     Quit date: 2016     Years since quittin.5     Passive exposure: Past    Smokeless tobacco: Never   Substance Use Topics    Alcohol use: Yes     Alcohol/week: 1.0 standard drink of alcohol     Types: 1 Glasses of wine per week     Comment: rare- holiday    Drug use: No       Cancer-related family history is negative for Breast cancer, Ovarian cancer, and Cancer.    Current Outpatient Medications on File Prior to Visit   Medication Sig Dispense Refill    ACCU-CHEK ALFRED PLUS TEST STRP Strp USE TO TEST THREE TIMES DAILY 200 strip 3    albuterol sulfate (PROAIR RESPICLICK) 90 mcg/actuation AePB Inhale 2 puffs into the lungs every 4 (four) hours as needed. Rescue 1 each 5    amLODIPine (NORVASC) 5 MG tablet TAKE 1 TABLET BY MOUTH EVERY DAY (Patient taking differently: Take 5 mg by mouth 2 (two) times daily.) 90 tablet 3    atorvastatin (LIPITOR) 10 MG tablet TAKE 1 TABLET BY MOUTH EVERY DAY 90 tablet 1    benzonatate (TESSALON) 200 MG capsule Take 1 capsule (200 mg total) by mouth 3 (three) times daily as needed for Cough. 30 capsule 1    betamethasone dipropionate 0.05 % cream Apply topically 2 (two) times daily as needed (rash). 45 g 2    buPROPion (WELLBUTRIN XL) 150 MG TB24 tablet TAKE 1 TABLET BY MOUTH EVERY DAY 90 tablet 3    cetirizine (ZYRTEC) 10 MG tablet Take 10 mg by mouth every evening.       FLUAD QUAD ,65Y UP,,PF, 60 mcg (15 mcg x 4)/0.5 mL Syrg       losartan (COZAAR) 100 MG  tablet TAKE 1 TABLET BY MOUTH EVERY DAY 90 tablet 3    metFORMIN (GLUCOPHAGE) 500 MG tablet Take 1 tablet (500 mg total) by mouth 2 (two) times daily with meals. 180 tablet 3    metoprolol succinate (TOPROL-XL) 100 MG 24 hr tablet TAKE 1 TABLET BY MOUTH EVERY DAY 90 tablet 3    multivitamin (THERAGRAN) per tablet Take 1 tablet by mouth once daily.      mupirocin (BACTROBAN) 2 % ointment Apply topically 3 (three) times daily. 30 g 1    ondansetron (ZOFRAN-ODT) 8 MG TbDL Take 1 tablet (8 mg total) by mouth every 8 (eight) hours as needed (nausea/vomiting). Take 1 tablet (8 mg) by mouth every 8 hours as needed for nausea/vomiting. 60 tablet 5    OPDIVO 120 mg/12 mL Soln       OPDIVO 240 mg/24 mL Soln       OPDIVO 40 mg/4 mL injection       palonosetron (ALOXI) 0.25 mg/5 mL injection       pantoprazole (PROTONIX) 40 MG tablet TAKE 1 TABLET BY MOUTH EVERY DAY 90 tablet 3    READI-CAT 2 2 % (w/v) suspension       semaglutide (OZEMPIC) 0.25 mg or 0.5 mg(2 mg/1.5 mL) pen injector Inject 0.5 mg into the skin every 7 days. 1.5 mL 6    sertraline (ZOLOFT) 100 MG tablet TAKE 1 TABLET BY MOUTH EVERY EVENING. 90 tablet 3    tiotropium (SPIRIVA WITH HANDIHALER) 18 mcg inhalation capsule INHALE THE CONTENTS OF 1 CAPSULE BY MOUTH EVERY DAY 90 capsule 3    triamcinolone acetonide 0.1% (KENALOG) 0.1 % cream APPLY TOPICALLY TWICE A  g 2    WIXELA INHUB 250-50 mcg/dose diskus inhaler INHALE 1 PUFF INTO THE LUNGS 2 (TWO) TIMES DAILY. CONTROLLER 180 each 3    YERVOY 50 mg/10 mL (5 mg/mL)        Current Facility-Administered Medications on File Prior to Visit   Medication Dose Route Frequency Provider Last Rate Last Admin    fentaNYL injection 25 mcg  25 mcg Intravenous Q5 Min PRN Keesha Martins MD        haloperidol lactate injection 0.5 mg  0.5 mg Intravenous Once PRN Keesha Martins MD        HYDROmorphone injection 0.2 mg  0.2 mg Intravenous Q5 Min PRN Keesha Martins MD        ondansetron injection 4 mg  4 mg Intravenous Once PRN Eliezer,  "MD Keesha        sodium chloride 0.9% flush 10 mL  10 mL Intravenous PRN Keesha Martins MD           Review of patient's allergies indicates:   Allergen Reactions    Taxol [paclitaxel]      Hypersensitivity reaction to taxol, symptoms included shortness of breath, nausea, dizziness, flushing     Carboplatin Other (See Comments)     Itching and hives    Adhesive Rash     tegaderm burns and blistered skin       Vital Signs: BP (!) 140/72   Pulse 84   Ht 5' 2" (1.575 m)   Wt 83.4 kg (183 lb 13.8 oz)   LMP  (LMP Unknown)   SpO2 96%   BMI 33.63 kg/m²     ECOG Performance Status: 1    Physical Exam  Vitals reviewed.   Constitutional:       Appearance: Normal appearance.   HENT:      Head: Normocephalic and atraumatic.   Eyes:      General: No scleral icterus.     Extraocular Movements: Extraocular movements intact.   Pulmonary:      Effort: No accessory muscle usage or respiratory distress.   Abdominal:      General: There is no distension.   Musculoskeletal:         General: Normal range of motion.      Cervical back: Normal range of motion and neck supple.   Lymphadenopathy:      Cervical: No cervical adenopathy.   Skin:     General: Skin is warm and dry.   Neurological:      Mental Status: She is alert and oriented to person, place, and time.      Cranial Nerves: No cranial nerve deficit.   Psychiatric:         Mood and Affect: Mood and affect normal.         Judgment: Judgment normal.          Labs:    Imaging: I have personally reviewed the patient's available images and reports and summarized pertinent findings above in HPI.     Pathology: No new path            "

## 2024-02-16 NOTE — PROGRESS NOTES
ONCOLOGY FOLLOW UP VISIT.     Reason for visit: Toxicity visit on 9LA for recurrent stage III NSCLC    Cancer/Stage/TNM:    Cancer Staging   NSCLC of right lung  Staging form: Lung, AJCC 8th Edition  - Clinical stage from 9/29/2020: Stage IIIA (cT3, cN1, cM0) - Signed by Ramo Tucker MD on 10/24/2020         Oncology History   NSCLC of right lung   9/29/2020 Cancer Staged    Staging form: Lung, AJCC 8th Edition  - Clinical stage from 9/29/2020: Stage IIIA (cT3, cN1, cM0)     10/14/2020 Initial Diagnosis    NSCLC of right lung     11/17/2020 - 12/30/2020 Radiation Therapy    Treating physician: Harvinder Lara     Site  Technique  Energy  Dose/Fx (Gy)  #Fx  Total Dose (Gy)    RLL Lung  VMAT  6X  2  30 / 30  60       2/6/2023 -  Chemotherapy    Treatment Summary   Plan Name: OP NSCLC NIVOLUMAB 360 MG D1 & D22 WITH IPILIMUMAB 1 MG/KG Q6W PLUS CARBOPLATIN (AUC) PACLITAXEL Q3W X2 DOSES  Treatment Goal: Control  Status: Active  Start Date: 2/6/2023  End Date: 2/10/2025 (Planned)  Provider: Javi Avina MD  Chemotherapy: CARBOplatin (PARAPLATIN) 525 mg in sodium chloride 0.9% 337.5 mL chemo infusion, 525 mg, Intravenous, Clinic/HOD 1 time, 1 of 1 cycle  Administration: 525 mg (2/6/2023), 490 mg (2/27/2023)  PACLitaxeL (TAXOL) 200 mg/m2 = 396 mg in sodium chloride 0.9% 500 mL chemo infusion, 200 mg/m2 = 396 mg (100 % of original dose 200 mg/m2), Intravenous, Clinic/HOD 1 time, 1 of 1 cycle  Dose modification: 200 mg/m2 (original dose 200 mg/m2, Cycle 1), 200 mg/m2 (original dose 200 mg/m2, Cycle 1)  Administration: 396 mg (2/6/2023), 396 mg (2/27/2023)     6/12/2023 - 6/30/2023 Radiation Therapy    Treating physician: Harvinder Lara    Site Technique Energy Dose/Fx (Gy) #Fx Total Dose (Gy)   RLL Lung RtLung 6X 4 15 / 15 60           Interval History:   Today, patient reports feeling anxious. She has some RUQ pain that is controlled with prn ibuprofen. SOB has improved.     ROS:  Review of Systems    Constitutional:  Negative for chills, fever and weight loss.   HENT:  Negative for hearing loss, nosebleeds and sore throat.    Eyes:  Negative for blurred vision and double vision.   Respiratory:  Positive for cough (wet) and wheezing. Negative for hemoptysis and shortness of breath.    Cardiovascular:  Negative for chest pain and leg swelling.   Gastrointestinal:  Negative for abdominal pain, blood in stool, diarrhea, heartburn, nausea and vomiting.   Genitourinary:  Negative for dysuria, frequency and hematuria.   Musculoskeletal:  Negative for back pain, joint pain and myalgias.   Skin:  Positive for rash. Negative for itching.   Neurological:  Negative for dizziness, tingling, sensory change, speech change and headaches.   Endo/Heme/Allergies:  Does not bruise/bleed easily.   Psychiatric/Behavioral:  The patient is not nervous/anxious.           A complete 12-point review of systems was reviewed and is negative except as mentioned above.     Past Medical History:   Past Medical History:   Diagnosis Date    AICD (automatic cardioverter/defibrillator) present     Asthma     bronchitis in past    Breast cancer 2016    right    Cardiac pacemaker     Cardiomyopathy     COPD (chronic obstructive pulmonary disease)     Diabetes mellitus, type 2     Hyperglycemia     Hyperlipidemia     Hypertension     Malignant neoplasm of overlapping sites of female breast 2/12/2016    Nuclear sclerosis of both eyes 8/12/2020    Respiratory distress 3/12/2020        Allergies:   Review of patient's allergies indicates:   Allergen Reactions    Taxol [paclitaxel]      Hypersensitivity reaction to taxol, symptoms included shortness of breath, nausea, dizziness, flushing     Carboplatin Other (See Comments)     Itching and hives    Adhesive Rash     tegaderm burns and blistered skin        Medications:   Current Outpatient Medications   Medication Sig Dispense Refill    ACCU-CHEK ALFRED PLUS TEST STRP Strp USE TO TEST THREE TIMES DAILY  200 strip 3    albuterol sulfate (PROAIR RESPICLICK) 90 mcg/actuation AePB Inhale 2 puffs into the lungs every 4 (four) hours as needed. Rescue 1 each 5    amLODIPine (NORVASC) 5 MG tablet TAKE 1 TABLET BY MOUTH EVERY DAY (Patient taking differently: Take 5 mg by mouth 2 (two) times daily.) 90 tablet 3    atorvastatin (LIPITOR) 10 MG tablet TAKE 1 TABLET BY MOUTH EVERY DAY 90 tablet 1    benzonatate (TESSALON) 200 MG capsule Take 1 capsule (200 mg total) by mouth 3 (three) times daily as needed for Cough. 30 capsule 1    betamethasone dipropionate 0.05 % cream Apply topically 2 (two) times daily as needed (rash). 45 g 2    buPROPion (WELLBUTRIN XL) 150 MG TB24 tablet TAKE 1 TABLET BY MOUTH EVERY DAY 90 tablet 3    cetirizine (ZYRTEC) 10 MG tablet Take 10 mg by mouth every evening.       FLUAD QUAD 2023-24,65Y UP,,PF, 60 mcg (15 mcg x 4)/0.5 mL Syrg       losartan (COZAAR) 100 MG tablet TAKE 1 TABLET BY MOUTH EVERY DAY 90 tablet 3    metFORMIN (GLUCOPHAGE) 500 MG tablet Take 1 tablet (500 mg total) by mouth 2 (two) times daily with meals. 180 tablet 3    metoprolol succinate (TOPROL-XL) 100 MG 24 hr tablet TAKE 1 TABLET BY MOUTH EVERY DAY 90 tablet 3    multivitamin (THERAGRAN) per tablet Take 1 tablet by mouth once daily.      mupirocin (BACTROBAN) 2 % ointment Apply topically 3 (three) times daily. 30 g 1    ondansetron (ZOFRAN-ODT) 8 MG TbDL Take 1 tablet (8 mg total) by mouth every 8 (eight) hours as needed (nausea/vomiting). Take 1 tablet (8 mg) by mouth every 8 hours as needed for nausea/vomiting. 60 tablet 5    OPDIVO 120 mg/12 mL Soln       OPDIVO 240 mg/24 mL Soln       OPDIVO 40 mg/4 mL injection       palonosetron (ALOXI) 0.25 mg/5 mL injection       pantoprazole (PROTONIX) 40 MG tablet TAKE 1 TABLET BY MOUTH EVERY DAY 90 tablet 3    READI-CAT 2 2 % (w/v) suspension       semaglutide (OZEMPIC) 0.25 mg or 0.5 mg(2 mg/1.5 mL) pen injector Inject 0.5 mg into the skin every 7 days. 1.5 mL 6    sertraline  "(ZOLOFT) 100 MG tablet TAKE 1 TABLET BY MOUTH EVERY EVENING. 90 tablet 3    tiotropium (SPIRIVA WITH HANDIHALER) 18 mcg inhalation capsule INHALE THE CONTENTS OF 1 CAPSULE BY MOUTH EVERY DAY 90 capsule 3    triamcinolone acetonide 0.1% (KENALOG) 0.1 % cream APPLY TOPICALLY TWICE A  g 2    WIXELA INHUB 250-50 mcg/dose diskus inhaler INHALE 1 PUFF INTO THE LUNGS 2 (TWO) TIMES DAILY. CONTROLLER 180 each 3    YERVOY 50 mg/10 mL (5 mg/mL)        No current facility-administered medications for this visit.     Facility-Administered Medications Ordered in Other Visits   Medication Dose Route Frequency Provider Last Rate Last Admin    fentaNYL injection 25 mcg  25 mcg Intravenous Q5 Min PRKeesha Mirza MD        haloperidol lactate injection 0.5 mg  0.5 mg Intravenous Once PRKeesha Mirza MD        HYDROmorphone injection 0.2 mg  0.2 mg Intravenous Q5 Min PRKeesha Mirza MD        ondansetron injection 4 mg  4 mg Intravenous Once PRKeesha Mirza MD        sodium chloride 0.9% flush 10 mL  10 mL Intravenous PRN Keesha Martins MD            Physical Exam:   /81 (BP Location: Right arm, Patient Position: Sitting, BP Method: Medium (Automatic))   Pulse 90   Temp 98.1 °F (36.7 °C) (Oral)   Resp 18   Ht 5' 2" (1.575 m)   Wt 82.2 kg (181 lb 3.5 oz)   LMP  (LMP Unknown)   SpO2 (!) 94%   BMI 33.15 kg/m²      ECOG Performance Status: (foot note - ECOG PS provided by Eastern Cooperative Oncology Group) 0 - Asymptomatic    Physical Exam  Vitals and nursing note reviewed.   Constitutional:       Appearance: Normal appearance. She is well-developed and normal weight.   HENT:      Head: Normocephalic and atraumatic.   Eyes:      Conjunctiva/sclera: Conjunctivae normal.      Pupils: Pupils are equal, round, and reactive to light.   Pulmonary:      Effort: Pulmonary effort is normal. No respiratory distress.   Abdominal:      General: There is no distension.      Palpations: Abdomen is soft.   Musculoskeletal:         " General: No swelling. Normal range of motion.      Cervical back: Normal range of motion and neck supple.      Left ankle: Swelling (trace) present.   Lymphadenopathy:      Cervical: No cervical adenopathy.   Skin:     General: Skin is warm and dry.      Findings: Rash (chest, back, and thighs) present. Rash is macular and papular.   Neurological:      General: No focal deficit present.      Mental Status: She is alert and oriented to person, place, and time.   Psychiatric:         Mood and Affect: Mood and affect normal.         Behavior: Behavior normal.               Labs:   Recent Results (from the past 48 hour(s))   CBC W/ AUTO DIFFERENTIAL    Collection Time: 02/14/24 10:33 AM   Result Value Ref Range    WBC 7.41 3.90 - 12.70 K/uL    RBC 4.82 4.00 - 5.40 M/uL    Hemoglobin 12.6 12.0 - 16.0 g/dL    Hematocrit 40.6 37.0 - 48.5 %    MCV 84 82 - 98 fL    MCH 26.1 (L) 27.0 - 31.0 pg    MCHC 31.0 (L) 32.0 - 36.0 g/dL    RDW 16.8 (H) 11.5 - 14.5 %    Platelets 235 150 - 450 K/uL    MPV 12.1 9.2 - 12.9 fL    Immature Granulocytes 0.8 (H) 0.0 - 0.5 %    Gran # (ANC) 5.3 1.8 - 7.7 K/uL    Immature Grans (Abs) 0.06 (H) 0.00 - 0.04 K/uL    Lymph # 1.3 1.0 - 4.8 K/uL    Mono # 0.6 0.3 - 1.0 K/uL    Eos # 0.1 0.0 - 0.5 K/uL    Baso # 0.07 0.00 - 0.20 K/uL    nRBC 0 0 /100 WBC    Gran % 72.1 38.0 - 73.0 %    Lymph % 17.9 (L) 18.0 - 48.0 %    Mono % 7.4 4.0 - 15.0 %    Eosinophil % 0.9 0.0 - 8.0 %    Basophil % 0.9 0.0 - 1.9 %    Differential Method Automated    CMP    Collection Time: 02/14/24 10:33 AM   Result Value Ref Range    Sodium 140 136 - 145 mmol/L    Potassium 4.9 3.5 - 5.1 mmol/L    Chloride 104 95 - 110 mmol/L    CO2 26 23 - 29 mmol/L    Glucose 114 (H) 70 - 110 mg/dL    BUN 26 (H) 8 - 23 mg/dL    Creatinine 1.2 0.5 - 1.4 mg/dL    Calcium 10.2 8.7 - 10.5 mg/dL    Total Protein 7.7 6.0 - 8.4 g/dL    Albumin 3.6 3.5 - 5.2 g/dL    Total Bilirubin 0.3 0.1 - 1.0 mg/dL    Alkaline Phosphatase 60 55 - 135 U/L    AST 16  10 - 40 U/L    ALT 15 10 - 44 U/L    eGFR 49.9 (A) >60 mL/min/1.73 m^2    Anion Gap 10 8 - 16 mmol/L              Imaging:   CT Chest Abdomen Pelvis With IV Contrast (XPD) Routine Oral Contrast  Narrative: EXAMINATION:  CT CHEST ABDOMEN PELVIS WITH IV CONTRAST (XPD)    CLINICAL HISTORY:  Non-small cell lung cancer (NSCLC), metastatic, assess treatment response;Malignant neoplasm of unspecified part of right bronchus or lung    TECHNIQUE:  Low dose axial images were obtained from the thoracic inlet to the pubic symphysis following the intravenous administration of 75cc of Omnipaque 350and 450cc oral Omnipaque 350.  Sagittal and coronal reformats were provided.    COMPARISON:  CT chest 01/14/2023    CT chest abdomen pelvis 11/16/2023, 07/31/2023    FINDINGS:  Base of Neck: No significant abnormality.    Thoracic soft tissues: Left upper chest wall cardiac pacemaker with leads in stable position.    Heart: No cardiomegaly or pericardial effusion.    Larissa/Mediastinum: No significant lymphadenopathy    Lungs: Near complete atelectasis of the right lower lobe again noted, presumably secondary to bronchial plugging, similar to prior (series 6 image [id number]).    Malignant mass documented on 01/12/2023 is not clearly identified due to right lower lobe atelectasis.    Bilateral scattered patchy ground-glass opacities seen on recent CT chest have resolved.  No new opacities noted.    Liver: Enlarged measuring 18 cm in craniocaudal dimension.  1.2 cm ill-defined area of decreased attenuation at the junction of the right and left hepatic lobes near the gallbladder fossa (series 3, image 59).    Gallbladder: Surgically absent.    Bile Ducts: No intrahepatic biliary ductal dilation.  Common bile duct measures 7.0 mm which can be seen post cholecystectomy.    Pancreas: No pancreatic mass lesion or peripancreatic inflammatory change.    Spleen: Unremarkable.    Adrenals: Unremarkable.    Kidneys/ Ureters: Normal in size and  location.  Kidneys enhance symmetrically.  No nephrolithiasis.  No hydroureteronephrosis.    Bladder: Smooth contours without bladder wall thickening.    Reproductive organs: Uterus is present.  A few calcifications within the uterus may represent fibroids.    GI Tract/Mesentery: The esophagus and stomach are normal in appearance.  Visualized loops of small and large bowel are normal in caliber without evidence for obstruction or inflammation.   Appendix is unremarkable.    No abdominopelvic ascites, intraperitoneal free air, or significant adenopathy.    Abdominal wall/extraperitoneal soft tissues: Small fat containing umbilical hernia.    Vasculature: Left-sided three-vessel aortic arch without significant calcific atherosclerosis.  Thoracic and abdominal aorta are normal in caliber, contour, and course.  Mild aortoiliac atherosclerosis.    Bones: Mild degenerative changes.  No acute displaced fracture..  Impression: 1. Right lower lobe malignant mass not visualized due to complete atelectasis of the right lower lung.  No new lung lesions noted.  2. Interval resolution of scattered patchy ground-glass opacities.  3. New 1.2 cm ill-defined area of decreased attenuation at the junction of the right and left hepatic lobes near the gallbladder fossa, concerning for developing hepatic metastasis.  Consider MRI versus attention on follow-up CT.  This report was flagged in Epic as abnormal.    Electronically signed by resident: Joycelyn Bailey  Date:    02/14/2024  Time:    14:00    Electronically signed by: Tex Bowers MD  Date:    02/14/2024  Time:    15:40            Diagnoses:       1. NSCLC of right lung    2. Liver disease, unspecified    3. Radiation pneumonitis    4. COVID-19 virus infection    5. History of breast cancer in female    6. Dilated cardiomyopathy    7. Left bundle-branch block    8. Cardiac resynchronization therapy defibrillator (CRT-D) in place          Assessment and Plan:         1-3. Recurrent  NSCLC  Plan is for definitive chemoRT, will need re-staging scans prior.  Reviewed with patient in detail her diagnosis, stage, treatment options, and prognosis (3 year OS 66% vs. 43.5% without durvalumab) with standard of care chemoRT followed by maintenance immunotherapy.  Patient has consented for [id number] clinical trial, a randomized control trial evaluating combination chemoRT + durvalumab followed by maintenance vs. SOC chemoRT -> maintenance.  CT scans 7/2021 with CR.  - patient randomized on [id number] to SOC arm (Imfinzi) - completed 12/2021.    - CT scan 12/2022 with progressing pulmonary nodule in LLL, still with stable disease in RLL consolidation. PET scan also showing enlarging right lower lobe mass with increased hypermetabolic activity concerning for recurrence. Will present her case at the next thoracic tumor board to discuss biopsy and possible treatment options.   - Biopsy of lung mass consistent with SCC. Initiated on 9LA. Initial re-staging scans with stable disease.   - Patient has completed RT 4-5 weeks ago and has no symptoms. She has some mild inflammation on Chest CT, but since asymptomatic and completed RT will re-initiate IO.  - Now s/p 3 cycles of 9LA. With persistent IO induced rash and continued pneumonitis on recent imaging, plan to hold IO and proceed with surveillance.  - 10/16/23 CT showing continued evidence of inflammation/infection. Currently has cold symptoms. +Covid.   - Repeat CT scan (preliminary reading) shows improvement in inflammation but indeterminate possible new liver metastases. Will confirm with PETCT.     4.  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      5-8.  Stable, follows with cardiology. AICD placed, but patient now has recovered EF and only takes lasix prn.  NYHA class I.        MDM includes:    - Acute or chronic illness or injury that poses a threat to life or bodily function  - Independent review and explanation of 2 results from unique  tests  - Discussion of management and ordering 2 unique tests  - Extensive discussion of treatment and management  - Prescription drug management  - Drug therapy requiring intensive monitoring for toxicity    Javi Avina M.D.  Hematology/Oncology Attending  Director [facility]  [facility]          Route Chart for Scheduling    Med Onc Chart Routing  Urgent    Follow up with physician Other. RTC 2 days after PETCT next available   Follow up with DANIEL    Infusion scheduling note    Injection scheduling note    Labs    Imaging PET scan      Pharmacy appointment    Other referrals          Treatment Plan Information   OP NSCLC NIVOLUMAB 360 MG D1 & D22 WITH IPILIMUMAB 1 MG/KG Q6W PLUS CARBOPLATIN (AUC) PACLITAXEL Q3W X2 DOSES   Javi Avina MD   Upcoming Treatment Dates - OP NSCLC NIVOLUMAB 360 MG D1 & D22 WITH IPILIMUMAB 1 MG/KG Q6W PLUS CARBOPLATIN (AUC) PACLITAXEL Q3W X2 DOSES    8/14/2023       Immunotherapy       nivolumab 360 mg in sodium chloride 0.9% 86 mL infusion  9/4/2023       Immunotherapy       nivolumab 360 mg in sodium chloride 0.9% 86 mL infusion       ipilimumab (YERVOY) 1 mg/kg = 81 mg in sodium chloride 0.9% 66.2 mL chemo infusion  9/25/2023       Immunotherapy       nivolumab 360 mg in sodium chloride 0.9% 86 mL infusion  10/16/2023       Immunotherapy       nivolumab 360 mg in sodium chloride 0.9% 86 mL infusion       ipilimumab (YERVOY) 1 mg/kg = 81 mg in sodium chloride 0.9% 66.2 mL chemo infusion

## 2024-02-17 ENCOUNTER — PATIENT MESSAGE (OUTPATIENT)
Dept: FAMILY MEDICINE | Facility: CLINIC | Age: 69
End: 2024-02-17
Payer: MEDICARE

## 2024-02-17 DIAGNOSIS — M54.16 LUMBAR RADICULOPATHY: Primary | ICD-10-CM

## 2024-02-19 RX ORDER — GABAPENTIN 100 MG/1
100 CAPSULE ORAL 3 TIMES DAILY
Qty: 45 CAPSULE | Refills: 0 | Status: SHIPPED | OUTPATIENT
Start: 2024-02-19 | End: 2024-05-02

## 2024-02-23 LAB
OHS CV AF BURDEN PERCENT: < 1
OHS CV DC REMOTE DEVICE TYPE: NORMAL
OHS CV RV PACING PERCENT: 96.9 %

## 2024-02-27 ENCOUNTER — HOSPITAL ENCOUNTER (OUTPATIENT)
Dept: RADIOLOGY | Facility: HOSPITAL | Age: 69
Discharge: HOME | End: 2024-02-27
Attending: INTERNAL MEDICINE
Payer: MEDICARE

## 2024-02-27 DIAGNOSIS — K76.9 LIVER DISEASE, UNSPECIFIED: ICD-10-CM

## 2024-02-27 LAB — POCT GLUCOSE: 99 MG/DL (ref 70–110)

## 2024-02-27 PROCEDURE — 78815 PET IMAGE W/CT SKULL-THIGH: CPT | Mod: 26,PS,HCNC, | Performed by: STUDENT IN AN ORGANIZED HEALTH CARE EDUCATION/TRAINING PROGRAM

## 2024-02-27 PROCEDURE — A9552 F18 FDG: HCPCS | Mod: HCNC | Performed by: INTERNAL MEDICINE

## 2024-02-27 PROCEDURE — 78815 PET IMAGE W/CT SKULL-THIGH: CPT | Mod: TC,HCNC,PS

## 2024-02-27 RX ORDER — FLUDEOXYGLUCOSE F18 500 MCI/ML
10 INJECTION INTRAVENOUS
Status: COMPLETED | OUTPATIENT
Start: 2024-02-27 | End: 2024-02-27

## 2024-02-27 RX ADMIN — FLUDEOXYGLUCOSE F-18 12 MILLICURIE: 500 INJECTION INTRAVENOUS at 02:02

## 2024-03-07 ENCOUNTER — OFFICE VISIT (OUTPATIENT)
Dept: HEMATOLOGY/ONCOLOGY | Facility: CLINIC | Age: 69
End: 2024-03-07
Payer: MEDICARE

## 2024-03-07 VITALS
BODY MASS INDEX: 33.63 KG/M2 | RESPIRATION RATE: 14 BRPM | TEMPERATURE: 98 F | DIASTOLIC BLOOD PRESSURE: 68 MMHG | SYSTOLIC BLOOD PRESSURE: 135 MMHG | OXYGEN SATURATION: 96 % | HEIGHT: 62 IN | HEART RATE: 87 BPM | WEIGHT: 182.75 LBS

## 2024-03-07 DIAGNOSIS — I44.7 LEFT BUNDLE-BRANCH BLOCK: ICD-10-CM

## 2024-03-07 DIAGNOSIS — U07.1 COVID-19 VIRUS INFECTION: ICD-10-CM

## 2024-03-07 DIAGNOSIS — Z85.3 HISTORY OF BREAST CANCER IN FEMALE: ICD-10-CM

## 2024-03-07 DIAGNOSIS — Z95.810 CARDIAC RESYNCHRONIZATION THERAPY DEFIBRILLATOR (CRT-D) IN PLACE: ICD-10-CM

## 2024-03-07 DIAGNOSIS — I42.0 DILATED CARDIOMYOPATHY: ICD-10-CM

## 2024-03-07 DIAGNOSIS — C34.91 NSCLC OF RIGHT LUNG: Primary | ICD-10-CM

## 2024-03-07 DIAGNOSIS — J70.0 RADIATION PNEUMONITIS: ICD-10-CM

## 2024-03-07 DIAGNOSIS — K76.9 LIVER DISEASE, UNSPECIFIED: ICD-10-CM

## 2024-03-07 PROCEDURE — 3075F SYST BP GE 130 - 139MM HG: CPT | Mod: HCNC,CPTII,S$GLB, | Performed by: INTERNAL MEDICINE

## 2024-03-07 PROCEDURE — 1126F AMNT PAIN NOTED NONE PRSNT: CPT | Mod: HCNC,CPTII,S$GLB, | Performed by: INTERNAL MEDICINE

## 2024-03-07 PROCEDURE — 1160F RVW MEDS BY RX/DR IN RCRD: CPT | Mod: HCNC,CPTII,S$GLB, | Performed by: INTERNAL MEDICINE

## 2024-03-07 PROCEDURE — 1159F MED LIST DOCD IN RCRD: CPT | Mod: HCNC,CPTII,S$GLB, | Performed by: INTERNAL MEDICINE

## 2024-03-07 PROCEDURE — 1157F ADVNC CARE PLAN IN RCRD: CPT | Mod: HCNC,CPTII,S$GLB, | Performed by: INTERNAL MEDICINE

## 2024-03-07 PROCEDURE — 3008F BODY MASS INDEX DOCD: CPT | Mod: HCNC,CPTII,S$GLB, | Performed by: INTERNAL MEDICINE

## 2024-03-07 PROCEDURE — 4010F ACE/ARB THERAPY RXD/TAKEN: CPT | Mod: HCNC,CPTII,S$GLB, | Performed by: INTERNAL MEDICINE

## 2024-03-07 PROCEDURE — 99214 OFFICE O/P EST MOD 30 MIN: CPT | Mod: HCNC,S$GLB,, | Performed by: INTERNAL MEDICINE

## 2024-03-07 PROCEDURE — 3072F LOW RISK FOR RETINOPATHY: CPT | Mod: HCNC,CPTII,S$GLB, | Performed by: INTERNAL MEDICINE

## 2024-03-07 PROCEDURE — 99999 PR PBB SHADOW E&M-EST. PATIENT-LVL V: CPT | Mod: PBBFAC,HCNC,, | Performed by: INTERNAL MEDICINE

## 2024-03-07 PROCEDURE — 3078F DIAST BP <80 MM HG: CPT | Mod: HCNC,CPTII,S$GLB, | Performed by: INTERNAL MEDICINE

## 2024-03-07 NOTE — PROGRESS NOTES
ONCOLOGY FOLLOW UP VISIT.     Reason for visit: Toxicity visit on 9LA for recurrent stage III NSCLC    Cancer/Stage/TNM:    Cancer Staging   NSCLC of right lung  Staging form: Lung, AJCC 8th Edition  - Clinical stage from 9/29/2020: Stage IIIA (cT3, cN1, cM0) - Signed by Ramo Tucker MD on 10/24/2020         Oncology History   NSCLC of right lung   9/29/2020 Cancer Staged    Staging form: Lung, AJCC 8th Edition  - Clinical stage from 9/29/2020: Stage IIIA (cT3, cN1, cM0)     10/14/2020 Initial Diagnosis    NSCLC of right lung     11/17/2020 - 12/30/2020 Radiation Therapy    Treating physician: Harvinder Lara     Site  Technique  Energy  Dose/Fx (Gy)  #Fx  Total Dose (Gy)    RLL Lung  VMAT  6X  2  30 / 30  60       2/6/2023 -  Chemotherapy    Treatment Summary   Plan Name: OP NSCLC NIVOLUMAB 360 MG D1 & D22 WITH IPILIMUMAB 1 MG/KG Q6W PLUS CARBOPLATIN (AUC) PACLITAXEL Q3W X2 DOSES  Treatment Goal: Control  Status: Active  Start Date: 2/6/2023  End Date: 2/10/2025 (Planned)  Provider: Javi Avina MD  Chemotherapy: CARBOplatin (PARAPLATIN) 525 mg in sodium chloride 0.9% 337.5 mL chemo infusion, 525 mg, Intravenous, Clinic/HOD 1 time, 1 of 1 cycle  Administration: 525 mg (2/6/2023), 490 mg (2/27/2023)  PACLitaxeL (TAXOL) 200 mg/m2 = 396 mg in sodium chloride 0.9% 500 mL chemo infusion, 200 mg/m2 = 396 mg (100 % of original dose 200 mg/m2), Intravenous, Clinic/HOD 1 time, 1 of 1 cycle  Dose modification: 200 mg/m2 (original dose 200 mg/m2, Cycle 1), 200 mg/m2 (original dose 200 mg/m2, Cycle 1)  Administration: 396 mg (2/6/2023), 396 mg (2/27/2023)     6/12/2023 - 6/30/2023 Radiation Therapy    Treating physician: Harvinder Lara    Site Technique Energy Dose/Fx (Gy) #Fx Total Dose (Gy)   RLL Lung RtLung 6X 4 15 / 15 60           Interval History:   Today, patient has no complaints.     ROS:  Review of Systems   Constitutional:  Negative for chills, fever and weight loss.   HENT:  Negative for hearing loss,  nosebleeds and sore throat.    Eyes:  Negative for blurred vision and double vision.   Respiratory:  Negative for cough, hemoptysis, shortness of breath and wheezing.    Cardiovascular:  Negative for chest pain and leg swelling.   Gastrointestinal:  Negative for abdominal pain, blood in stool, diarrhea, heartburn, nausea and vomiting.   Genitourinary:  Negative for dysuria, frequency and hematuria.   Musculoskeletal:  Negative for back pain, joint pain and myalgias.   Skin:  Negative for itching and rash.   Neurological:  Negative for dizziness, tingling, sensory change, speech change and headaches.   Endo/Heme/Allergies:  Does not bruise/bleed easily.   Psychiatric/Behavioral:  The patient is not nervous/anxious.           A complete 12-point review of systems was reviewed and is negative except as mentioned above.     Past Medical History:   Past Medical History:   Diagnosis Date    AICD (automatic cardioverter/defibrillator) present     Asthma     bronchitis in past    Breast cancer 2016    right    Cardiac pacemaker     Cardiomyopathy     COPD (chronic obstructive pulmonary disease)     Diabetes mellitus, type 2     Hyperglycemia     Hyperlipidemia     Hypertension     Malignant neoplasm of overlapping sites of female breast 2/12/2016    Nuclear sclerosis of both eyes 8/12/2020    Respiratory distress 3/12/2020        Allergies:   Review of patient's allergies indicates:   Allergen Reactions    Taxol [paclitaxel]      Hypersensitivity reaction to taxol, symptoms included shortness of breath, nausea, dizziness, flushing     Carboplatin Other (See Comments)     Itching and hives    Adhesive Rash     tegaderm burns and blistered skin        Medications:   Current Outpatient Medications   Medication Sig Dispense Refill    ACCU-CHEK ALFRED PLUS TEST STRP Strp USE TO TEST THREE TIMES DAILY 200 strip 3    albuterol sulfate (PROAIR RESPICLICK) 90 mcg/actuation AePB Inhale 2 puffs into the lungs every 4 (four) hours as  needed. Rescue 1 each 5    amLODIPine (NORVASC) 5 MG tablet TAKE 1 TABLET BY MOUTH EVERY DAY (Patient taking differently: Take 5 mg by mouth 2 (two) times daily.) 90 tablet 3    atorvastatin (LIPITOR) 10 MG tablet TAKE 1 TABLET BY MOUTH EVERY DAY 90 tablet 1    benzonatate (TESSALON) 200 MG capsule Take 1 capsule (200 mg total) by mouth 3 (three) times daily as needed for Cough. 30 capsule 1    betamethasone dipropionate 0.05 % cream Apply topically 2 (two) times daily as needed (rash). 45 g 2    buPROPion (WELLBUTRIN XL) 150 MG TB24 tablet TAKE 1 TABLET BY MOUTH EVERY DAY 90 tablet 3    cetirizine (ZYRTEC) 10 MG tablet Take 10 mg by mouth every evening.       FLUAD QUAD 2023-24,65Y UP,,PF, 60 mcg (15 mcg x 4)/0.5 mL Syrg       losartan (COZAAR) 100 MG tablet TAKE 1 TABLET BY MOUTH EVERY DAY 90 tablet 3    metFORMIN (GLUCOPHAGE) 500 MG tablet Take 1 tablet (500 mg total) by mouth 2 (two) times daily with meals. 180 tablet 3    metoprolol succinate (TOPROL-XL) 100 MG 24 hr tablet TAKE 1 TABLET BY MOUTH EVERY DAY 90 tablet 3    multivitamin (THERAGRAN) per tablet Take 1 tablet by mouth once daily.      mupirocin (BACTROBAN) 2 % ointment Apply topically 3 (three) times daily. 30 g 1    ondansetron (ZOFRAN-ODT) 8 MG TbDL Take 1 tablet (8 mg total) by mouth every 8 (eight) hours as needed (nausea/vomiting). Take 1 tablet (8 mg) by mouth every 8 hours as needed for nausea/vomiting. 60 tablet 5    OPDIVO 120 mg/12 mL Soln       OPDIVO 240 mg/24 mL Soln       OPDIVO 40 mg/4 mL injection       palonosetron (ALOXI) 0.25 mg/5 mL injection       pantoprazole (PROTONIX) 40 MG tablet TAKE 1 TABLET BY MOUTH EVERY DAY 90 tablet 3    READI-CAT 2 2 % (w/v) suspension       semaglutide (OZEMPIC) 0.25 mg or 0.5 mg(2 mg/1.5 mL) pen injector Inject 0.5 mg into the skin every 7 days. 1.5 mL 6    sertraline (ZOLOFT) 100 MG tablet TAKE 1 TABLET BY MOUTH EVERY EVENING. 90 tablet 3    tiotropium (SPIRIVA WITH HANDIHALER) 18 mcg inhalation  "capsule INHALE THE CONTENTS OF 1 CAPSULE BY MOUTH EVERY DAY 90 capsule 3    triamcinolone acetonide 0.1% (KENALOG) 0.1 % cream APPLY TOPICALLY TWICE A  g 2    WIXELA INHUB 250-50 mcg/dose diskus inhaler INHALE 1 PUFF INTO THE LUNGS 2 (TWO) TIMES DAILY. CONTROLLER 180 each 3    YERVOY 50 mg/10 mL (5 mg/mL)       gabapentin (NEURONTIN) 100 MG capsule Take 1 capsule (100 mg total) by mouth 3 (three) times daily. for 15 days 45 capsule 0     No current facility-administered medications for this visit.     Facility-Administered Medications Ordered in Other Visits   Medication Dose Route Frequency Provider Last Rate Last Admin    fentaNYL injection 25 mcg  25 mcg Intravenous Q5 Min PRKeesha Mirza MD        haloperidol lactate injection 0.5 mg  0.5 mg Intravenous Once PRKeesha Mirza MD        HYDROmorphone injection 0.2 mg  0.2 mg Intravenous Q5 Min PRKeesha Mirza MD        ondansetron injection 4 mg  4 mg Intravenous Once PRN Keesha Martins MD        sodium chloride 0.9% flush 10 mL  10 mL Intravenous PRN Keesha Martins MD            Physical Exam:   /68 (BP Location: Left arm, Patient Position: Sitting, BP Method: Medium (Automatic))   Pulse 87   Temp 97.9 °F (36.6 °C) (Oral)   Resp 14   Ht 5' 2" (1.575 m)   Wt 82.9 kg (182 lb 12.2 oz)   LMP  (LMP Unknown)   SpO2 96%   BMI 33.43 kg/m²      ECOG Performance Status: (foot note - ECOG PS provided by Eastern Cooperative Oncology Group) 0 - Asymptomatic    Physical Exam  Vitals and nursing note reviewed.   Constitutional:       Appearance: Normal appearance. She is well-developed and normal weight.   HENT:      Head: Normocephalic and atraumatic.   Eyes:      Conjunctiva/sclera: Conjunctivae normal.      Pupils: Pupils are equal, round, and reactive to light.   Pulmonary:      Effort: Pulmonary effort is normal. No respiratory distress.   Abdominal:      General: There is no distension.      Palpations: Abdomen is soft.   Musculoskeletal:         General: No " swelling. Normal range of motion.      Cervical back: Normal range of motion and neck supple.      Left ankle: Swelling (trace) present.   Lymphadenopathy:      Cervical: No cervical adenopathy.   Skin:     General: Skin is warm and dry.      Findings: Rash (chest, back, and thighs) present. Rash is macular and papular.   Neurological:      General: No focal deficit present.      Mental Status: She is alert and oriented to person, place, and time.   Psychiatric:         Mood and Affect: Mood and affect normal.         Behavior: Behavior normal.                 Labs:   No results found for this or any previous visit (from the past 48 hour(s)).             Imaging:   NM PET CT FDG Skull Base to Mid Thigh  Narrative: EXAMINATION:  NM PET CT FDG SKULL BASE TO MID THIGH    CLINICAL HISTORY:  Liver lesion, > 1cm, history of malignancy;Non-small cell lung cancer (NSCLC), metastatic, assess treatment response; Liver disease, unspecified    TECHNIQUE:  12 mCi of F18-FDG was administered intravenously in the left antecubital fossa.  After an approximately 60 min distribution time, PET/CT images were acquired from the skull base to midthigh. Transmission images were acquired to correct for attenuation using a whole body low-dose CT scan without IV or oral contrast with the arms positioned above the head. Glycemia at the time of injection was 99 mg/dL.    COMPARISON:  CT chest abdomen pelvis 02/14/2024, PET CT 01/12/2023    FINDINGS:  Quality of the study: Adequate.    In the head and neck, there are no hypermetabolic lesions worrisome for malignancy. There are no hypermetabolic mucosal lesions, and there are no pathologically enlarged or hypermetabolic lymph nodes.    In the chest, there is redemonstration of right lower lobe pleural based mass, difficult to measure on noncontrast imaging, approximately 5.0 x 3.0 cm with SUV max of 16 (fused image 85).  Previously measured 6.8 x 4.4 with SUV max of 19.  There is adjacent  volume loss, pleural thickening, and small volume pleural fluid.    Right breast soft tissue density, which correlate with benign findings on mammography.  No concerning focal increased tracer uptake.    In the abdomen and pelvis, there is physiologic tracer distribution within the abdominal organs and excretion into the genitourinary system.    In the bones, there are no hypermetabolic lesions worrisome for malignancy.    In the extremities, there are no hypermetabolic lesions worrisome for malignancy.    Miscellaneous:    AICD in place.  Cholecystectomy.  Impression: Mildly decreased size of hypermetabolic right lower lobe pleural based lung mass as above.  No new focal abnormal tracer uptake elsewhere.    Electronically signed by resident: Brian Casarez  Date:    02/27/2024  Time:    16:26    Electronically signed by: Monico Hoskins  Date:    02/28/2024  Time:    12:21            Diagnoses:       1. NSCLC of right lung    2. Liver disease, unspecified    3. Radiation pneumonitis    4. COVID-19 virus infection    5. History of breast cancer in female    6. Dilated cardiomyopathy    7. Left bundle-branch block    8. Cardiac resynchronization therapy defibrillator (CRT-D) in place            Assessment and Plan:         1-3. Recurrent NSCLC  Plan is for definitive chemoRT, will need re-staging scans prior.  Reviewed with patient in detail her diagnosis, stage, treatment options, and prognosis (3 year OS 66% vs. 43.5% without durvalumab) with standard of care chemoRT followed by maintenance immunotherapy.  Patient has consented for [id number] clinical trial, a randomized control trial evaluating combination chemoRT + durvalumab followed by maintenance vs. SOC chemoRT -> maintenance.  CT scans 7/2021 with CR.  - patient randomized on [id number] to SOC arm (Imfinzi) - completed 12/2021.    - CT scan 12/2022 with progressing pulmonary nodule in LLL, still with stable disease in RLL consolidation. PET scan also showing  enlarging right lower lobe mass with increased hypermetabolic activity concerning for recurrence. Will present her case at the next thoracic tumor board to discuss biopsy and possible treatment options.   - Biopsy of lung mass consistent with SCC. Initiated on 9LA. Initial re-staging scans with stable disease.   - Patient has completed RT 4-5 weeks ago and has no symptoms. She has some mild inflammation on Chest CT, but since asymptomatic and completed RT will re-initiate IO.  - Now s/p 3 cycles of 9LA. With persistent IO induced rash and continued pneumonitis on recent imaging, plan to hold IO and proceed with surveillance.  - 10/16/23 CT showing continued evidence of inflammation/infection. Currently has cold symptoms. +Covid.   - Repeat CT scan (preliminary reading) shows improvement in inflammation but indeterminate possible new liver metastases. Will confirm with PETCT.  - PET scan (2/27/24) is not showing any hypermetabolic lesions in the liver along with mildly decreased size of hypermetabolic right lower lobe pleural based lung mass. Will continue with Q3 months surveillance.       4.  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      5-8.  Stable, follows with cardiology. AICD placed, but patient now has recovered EF and only takes lasix prn.  NYHA class I.    Patient was also seen and examined by Dr. Avina. Patient is in agreement with the proposed treatment plan. All questions were answered to the patient's satisfaction. Pt knows to call clinic if anything is needed before the next clinic visit.    Aide Magaña, MSN, APRN, FNP-C  Hematology and Medical Oncology  Nurse Practitioner to Dr. Javi Avina  Nurse Practitioner, [facility]      I have reviewed the notes, assessments, and/or procedures performed by Aide MONTELONGO, as above.  I have personally interviewed and examined the patient at the beside, and rounded with Aide. I concur with her  assessment and plan and the documentation of Hannah Montoya.    MDM includes:    - Acute or chronic illness or injury that poses a threat to life or bodily function  - Independent review and explanation of 2 results from unique tests  - Discussion of management and ordering 2 unique tests  - Extensive discussion of treatment and management  - Prescription drug management  - Drug therapy requiring intensive monitoring for toxicity    Javi Avina M.D.  Hematology/Oncology Attending  Director [facility]  [facility]          Route Chart for Scheduling    Med Onc Chart Routing      Follow up with physician 2 months. review imaging   Follow up with DANIEL    Infusion scheduling note    Injection scheduling note    Labs CBC and CMP   Scheduling:  Preferred lab:  Lab interval:     Imaging CT chest abdomen pelvis   in 2 months   Pharmacy appointment    Other referrals              Treatment Plan Information   OP NSCLC NIVOLUMAB 360 MG D1 & D22 WITH IPILIMUMAB 1 MG/KG Q6W PLUS CARBOPLATIN (AUC) PACLITAXEL Q3W X2 DOSES   Javi Avina MD   Upcoming Treatment Dates - OP NSCLC NIVOLUMAB 360 MG D1 & D22 WITH IPILIMUMAB 1 MG/KG Q6W PLUS CARBOPLATIN (AUC) PACLITAXEL Q3W X2 DOSES    8/14/2023       Immunotherapy       nivolumab 360 mg in sodium chloride 0.9% 86 mL infusion  9/4/2023       Immunotherapy       nivolumab 360 mg in sodium chloride 0.9% 86 mL infusion       ipilimumab (YERVOY) 1 mg/kg = 81 mg in sodium chloride 0.9% 66.2 mL chemo infusion  9/25/2023       Immunotherapy       nivolumab 360 mg in sodium chloride 0.9% 86 mL infusion  10/16/2023       Immunotherapy       nivolumab 360 mg in sodium chloride 0.9% 86 mL infusion       ipilimumab (YERVOY) 1 mg/kg = 81 mg in sodium chloride 0.9% 66.2 mL chemo infusion

## 2024-03-15 ENCOUNTER — PATIENT MESSAGE (OUTPATIENT)
Dept: FAMILY MEDICINE | Facility: CLINIC | Age: 69
End: 2024-03-15
Payer: MEDICARE

## 2024-03-15 ENCOUNTER — ON-DEMAND VIRTUAL (OUTPATIENT)
Dept: URGENT CARE | Facility: CLINIC | Age: 69
End: 2024-03-15
Payer: MEDICARE

## 2024-03-15 DIAGNOSIS — J20.9 ACUTE BRONCHITIS, UNSPECIFIED ORGANISM: Primary | ICD-10-CM

## 2024-03-15 DIAGNOSIS — J42 CHRONIC BRONCHITIS, UNSPECIFIED CHRONIC BRONCHITIS TYPE: ICD-10-CM

## 2024-03-15 PROCEDURE — 99213 OFFICE O/P EST LOW 20 MIN: CPT | Mod: 95,,, | Performed by: NURSE PRACTITIONER

## 2024-03-15 RX ORDER — IOHEXOL 350 MG/ML
INJECTION, SOLUTION INTRAVENOUS
COMMUNITY
Start: 2023-11-14

## 2024-03-15 RX ORDER — METHYLPREDNISOLONE 4 MG/1
TABLET ORAL
Qty: 21 EACH | Refills: 0 | Status: SHIPPED | OUTPATIENT
Start: 2024-03-15

## 2024-03-15 RX ORDER — ALBUTEROL SULFATE 90 UG/1
2 POWDER, METERED RESPIRATORY (INHALATION) EVERY 4 HOURS PRN
Qty: 1 EACH | Refills: 5 | Status: SHIPPED | OUTPATIENT
Start: 2024-03-15 | End: 2024-04-01

## 2024-03-15 RX ORDER — IOHEXOL 300 MG/ML
INJECTION, SOLUTION INTRAVENOUS
COMMUNITY
Start: 2023-10-16

## 2024-03-15 RX ORDER — AZITHROMYCIN 250 MG/1
TABLET, FILM COATED ORAL
Qty: 6 TABLET | Refills: 0 | Status: SHIPPED | OUTPATIENT
Start: 2024-03-15 | End: 2024-03-20

## 2024-03-15 RX ORDER — ALBUTEROL SULFATE 1.25 MG/3ML
SOLUTION RESPIRATORY (INHALATION)
Qty: 300 ML | Refills: 3 | Status: SHIPPED | OUTPATIENT
Start: 2024-03-15

## 2024-03-15 NOTE — PROGRESS NOTES
Subjective:      Patient ID: Hannah Montoya is a 68 y.o. female.    Vitals:  vitals were not taken for this visit.     Chief Complaint: URI      Visit Type: TELE AUDIOVISUAL    Present with the patient at the time of consultation: TELEMED PRESENT WITH PATIENT: None    Location: At home in [place]    Past Medical History:   Diagnosis Date    AICD (automatic cardioverter/defibrillator) present     Asthma     bronchitis in past    Breast cancer 2016    right    Cardiac pacemaker     Cardiomyopathy     COPD (chronic obstructive pulmonary disease)     Diabetes mellitus, type 2     Hyperglycemia     Hyperlipidemia     Hypertension     Malignant neoplasm of overlapping sites of female breast 2016    Nuclear sclerosis of both eyes 2020    Respiratory distress 3/12/2020     Past Surgical History:   Procedure Laterality Date    BIOPSY, WITH CT GUIDANCE Right 2023    Procedure: LUNG MASS BIOPSY, WITH CT GUIDANCE;  Surgeon: Yehuda Green MD;  Location: [facility], [place], operated by [facility] CATH LAB;  Service: Radiology;  Laterality: Right;    BREAST BIOPSY Right 2016    IDC    BREAST LUMPECTOMY Right     CARDIAC CATHETERIZATION Bilateral 2017    CARDIAC DEFIBRILLATOR PLACEMENT Left 08/10/2017    CARDIAC DEFIBRILLATOR PLACEMENT Left 2018     SECTION      x2    CHOLECYSTECTOMY      INSERTION OF TUNNELED CENTRAL VENOUS CATHETER (CVC) WITH SUBCUTANEOUS PORT Right 2020    Procedure: [id number]-[id number], RIGHT;  Surgeon: Josefa Caceres MD;  Location: [facility] OR [address] [place] [address];  Service: General;  Laterality: Right;  Port-a-cath placed to R. IJ    LUNG BIOPSY N/A 2020    Procedure: BIOPSY, LUNG;  Surgeon: [facility] Diagnostic Provider;  Location: [facility] OR;  Service: Radiology;  Laterality: N/A;  8AM START  RN PREOP 2020---COVID NEGATIVE    NAVIGATIONAL BRONCHOSCOPY N/A 10/13/2020    Procedure: BRONCHOSCOPY, NAVIGATIONAL;  Surgeon: Jamilah Weaver MD;  Location: [facility] OR [address] [place] [address];  Service: Pulmonary;  Laterality:  N/A;    REVISION OF IMPLANTABLE CARDIOVERTER-DEFIBRILLATOR (ICD) ELECTRODE LEAD PLACEMENT N/A 6/15/2018    Procedure: REVISION-LEAD-ICD;  Surgeon: Javy Gates MD;  Location: [facility] CATH LAB;  Service: Cardiology;  Laterality: N/A;  LBBB, Bi-V ICD HIS Ld rev, MDT, Choice, MB, 3 Prep    ROBOT-ASSISTED LAPAROSCOPIC LYMPHADENECTOMY USING DA SALVADOR XI Right 10/23/2020    Procedure: XI ROBOTIC LYMPHADENECTOMY;  Surgeon: Ramo Tucker MD;  Location: [facility] OR [facility];  Service: Thoracic;  Laterality: Right;    TUBAL LIGATION       Review of patient's allergies indicates:   Allergen Reactions    Taxol [paclitaxel]      Hypersensitivity reaction to taxol, symptoms included shortness of breath, nausea, dizziness, flushing     Carboplatin Other (See Comments)     Itching and hives    Adhesive Rash     tegaderm burns and blistered skin     Current Outpatient Medications on File Prior to Visit   Medication Sig Dispense Refill    ACCU-CHEK ALFRED PLUS TEST STRP Strp USE TO TEST THREE TIMES DAILY 200 strip 3    albuterol sulfate (PROAIR RESPICLICK) 90 mcg/actuation AePB Inhale 2 puffs into the lungs every 4 (four) hours as needed. Rescue 1 each 5    amLODIPine (NORVASC) 5 MG tablet TAKE 1 TABLET BY MOUTH EVERY DAY (Patient taking differently: Take 5 mg by mouth 2 (two) times daily.) 90 tablet 3    atorvastatin (LIPITOR) 10 MG tablet TAKE 1 TABLET BY MOUTH EVERY DAY 90 tablet 1    benzonatate (TESSALON) 200 MG capsule Take 1 capsule (200 mg total) by mouth 3 (three) times daily as needed for Cough. 30 capsule 1    betamethasone dipropionate 0.05 % cream Apply topically 2 (two) times daily as needed (rash). 45 g 2    buPROPion (WELLBUTRIN XL) 150 MG TB24 tablet TAKE 1 TABLET BY MOUTH EVERY DAY 90 tablet 3    cetirizine (ZYRTEC) 10 MG tablet Take 10 mg by mouth every evening.       FLUAD QUAD 2023-24,65Y UP,,PF, 60 mcg (15 mcg x 4)/0.5 mL Syrg       gabapentin (NEURONTIN) 100 MG capsule Take 1 capsule (100 mg total) by mouth 3  (three) times daily. for 15 days 45 capsule 0    losartan (COZAAR) 100 MG tablet TAKE 1 TABLET BY MOUTH EVERY DAY 90 tablet 3    metFORMIN (GLUCOPHAGE) 500 MG tablet Take 1 tablet (500 mg total) by mouth 2 (two) times daily with meals. 180 tablet 3    metoprolol succinate (TOPROL-XL) 100 MG 24 hr tablet TAKE 1 TABLET BY MOUTH EVERY DAY 90 tablet 3    multivitamin (THERAGRAN) per tablet Take 1 tablet by mouth once daily.      mupirocin (BACTROBAN) 2 % ointment Apply topically 3 (three) times daily. 30 g 1    ondansetron (ZOFRAN-ODT) 8 MG TbDL Take 1 tablet (8 mg total) by mouth every 8 (eight) hours as needed (nausea/vomiting). Take 1 tablet (8 mg) by mouth every 8 hours as needed for nausea/vomiting. 60 tablet 5    OPDIVO 120 mg/12 mL Soln       OPDIVO 240 mg/24 mL Soln       OPDIVO 40 mg/4 mL injection       palonosetron (ALOXI) 0.25 mg/5 mL injection       pantoprazole (PROTONIX) 40 MG tablet TAKE 1 TABLET BY MOUTH EVERY DAY 90 tablet 3    READI-CAT 2 2 % (w/v) suspension       semaglutide (OZEMPIC) 0.25 mg or 0.5 mg(2 mg/1.5 mL) pen injector Inject 0.5 mg into the skin every 7 days. 1.5 mL 6    sertraline (ZOLOFT) 100 MG tablet TAKE 1 TABLET BY MOUTH EVERY EVENING. 90 tablet 3    tiotropium (SPIRIVA WITH HANDIHALER) 18 mcg inhalation capsule INHALE THE CONTENTS OF 1 CAPSULE BY MOUTH EVERY DAY 90 capsule 3    triamcinolone acetonide 0.1% (KENALOG) 0.1 % cream APPLY TOPICALLY TWICE A  g 2    WIXELA INHUB 250-50 mcg/dose diskus inhaler INHALE 1 PUFF INTO THE LUNGS 2 (TWO) TIMES DAILY. CONTROLLER 180 each 3    YERVOY 50 mg/10 mL (5 mg/mL)        Current Facility-Administered Medications on File Prior to Visit   Medication Dose Route Frequency Provider Last Rate Last Admin    fentaNYL injection 25 mcg  25 mcg Intravenous Q5 Min PRN Keesha Martins MD        haloperidol lactate injection 0.5 mg  0.5 mg Intravenous Once PRN Keesha Martins MD        HYDROmorphone injection 0.2 mg  0.2 mg Intravenous Q5 Min PRN Eliezer  MD Keesha        ondansetron injection 4 mg  4 mg Intravenous Once PRN Keesha Martins MD        sodium chloride 0.9% flush 10 mL  10 mL Intravenous PRN Keesha Martins MD         Family History   Problem Relation Age of Onset    Kidney disease Mother     Cataracts Mother     Kidney disease Father     Cataracts Father     Clotting disorder Brother     No Known Problems Daughter     No Known Problems Son     No Known Problems Sister     No Known Problems Maternal Aunt     No Known Problems Maternal Uncle     No Known Problems Paternal Aunt     No Known Problems Paternal Uncle     Macular degeneration Maternal Grandmother     No Known Problems Maternal Grandfather     No Known Problems Paternal Grandmother     No Known Problems Paternal Grandfather     Breast cancer Neg Hx     Ovarian cancer Neg Hx     Amblyopia Neg Hx     Blindness Neg Hx     Cancer Neg Hx     Diabetes Neg Hx     Glaucoma Neg Hx     Hypertension Neg Hx     Retinal detachment Neg Hx     Strabismus Neg Hx     Stroke Neg Hx     Thyroid disease Neg Hx        Medications Ordered                [id number] [place] - [address]   [address] [place] [address]    Telephone: [phone]   Fax: [phone]   Hours: Not open 24 hours                         E-Prescribed (2 of 2)              azithromycin (Z-RICKY) 250 MG tablet    Sig: Take 2 tablets by mouth on day 1; Take 1 tablet by mouth on days 2-5       Start: 3/15/24     Quantity: 6 tablet Refills: 0                         methylPREDNISolone (MEDROL DOSEPACK) 4 mg tablet    Sig: use as directed       Start: 3/15/24     Quantity: 21 each Refills: 0                           Ohs Peq Odvv Intake    3/15/2024 11:38 AM CDT - Filed by Patient   What is your current physical address in the event of a medical emergency? [address] [place]   Are you able to take your vital signs? Yes   Systolic Blood Pressure: 130   Diastolic Blood Pressure: 87   Weight: 177   Height: 62   Pulse: 61    Temperature:    Respiration rate:    Pulse Oxygen: 95   Please attach any relevant images or files          Reports started with a cold a few days (head and cold) and now it's in her chest. When she coughs, it hurts her ribs. Reports wheezing. Hx of copd and bronchitis. Denies fever. Tried tylenol and Emergen-C pills and Robitussin with moderate relief.     URI   The current episode started in the past 7 days. There has been no fever. Associated symptoms include congestion, coughing, rhinorrhea, sneezing, a sore throat and wheezing. She has tried acetaminophen for the symptoms. The treatment provided moderate relief.       Constitution: Negative for fatigue and fever.   HENT:  Positive for congestion and sore throat.    Respiratory:  Positive for cough, sputum production, COPD and wheezing. Negative for bloody sputum.    Allergic/Immunologic: Positive for sneezing.        Objective:   The physical exam was conducted virtually.  Physical Exam   Constitutional: She is oriented to person, place, and time. No distress.   HENT:   Head: Normocephalic.   Eyes: Conjunctivae are normal. No scleral icterus.   Pulmonary/Chest: Effort normal. No respiratory distress. She has wheezes (moderate wheezing expiratory).         Comments: + cough    Musculoskeletal: Normal range of motion.         General: Normal range of motion.   Neurological: She is alert and oriented to person, place, and time.   Skin: Skin is not diaphoretic.   Psychiatric: Her behavior is normal. Judgment and thought content normal.       Assessment:     1. Acute bronchitis, unspecified organism        Plan:       Acute bronchitis, unspecified organism    Other orders  -     methylPREDNISolone (MEDROL DOSEPACK) 4 mg tablet; use as directed  Dispense: 21 each; Refill: 0  -     azithromycin (Z-RICKY) 250 MG tablet; Take 2 tablets by mouth on day 1; Take 1 tablet by mouth on days 2-5  Dispense: 6 tablet; Refill: 0      Rest. Drink plenty of fluids to stay well  hydrated.    Use meds as directed. Use your inhaler and nebulizer PRN wheezing.     Cough: Cover the cough/cough into the elbow and wash hands often to prevent the spread of germs.  A cough may be the last symptom to go away and may persist for up to 4-6 weeks, so do not be alarmed.    If you start with worsening breathing/wheezing, shortness of breath, fever, coughing up blood, etc., go to the nearest urgent care or emergency department immediately.     Follow-up with your PCP for recheck in 3 days.    All questions were answered to the best of my abilities and all concerns were addressed at this time.     Follow up:   1. Please schedule a virtual follow up visit as needed.  2. Please see an in-person provider if your symptoms are worsening or not improving in 2-3 days.  3. Please print a copy of this note and send it to your regular doctor or take it to your next visit so it may be included in your medical record.   4. You must understand that you've received Telehealth Urgent Care treatment only and that you may be released before all your medical problems are known or treated. You, the patient, will arrange for follow up care as instructed.  5. Follow up with your PCP or specialty clinic as directed. To schedule an appointment with the appropriate provider with [facility], please call [phone]. For pediatric referrals, please call [phone]  6. Contact customer support at [phone] for questions or concerns  7. For prescription questions or problems, call [phone] anytime for assistance.  8. For [facility] support, call [phone].         Additional MDM:     Heart Failure Score:   COPD = Yes

## 2024-03-15 NOTE — TELEPHONE ENCOUNTER
Care Due:                  Date            Visit Type   Department     Provider  --------------------------------------------------------------------------------                                EP -         [facility]                              PRIMARY      MED/ INTERNAL  Last Visit: 12-      CARE ([facility])   MED/ PEDS      Emanuel A  Page                              EP -         [facility]                              PRIMARY      MED/ INTERNAL  Next Visit: 06-      CARE ([facility])   MED/ PEDS      Emanuel A  Page                                                            Last  Test          Frequency    Reason                     Performed    Due Date  --------------------------------------------------------------------------------    Lipid Panel.  12 months..  atorvastatin.............  05- 05-    [facility] Embedded Care Due Messages. Reference number: [id number].   3/15/2024 2:00:13 PM CDT

## 2024-03-29 ENCOUNTER — PATIENT MESSAGE (OUTPATIENT)
Dept: FAMILY MEDICINE | Facility: CLINIC | Age: 69
End: 2024-03-29
Payer: MEDICARE

## 2024-03-29 DIAGNOSIS — C34.91 NSCLC OF RIGHT LUNG: Primary | ICD-10-CM

## 2024-04-01 RX ORDER — ALBUTEROL SULFATE 90 UG/1
2 AEROSOL, METERED RESPIRATORY (INHALATION) EVERY 4 HOURS PRN
Qty: 18 G | Refills: 4 | Status: SHIPPED | OUTPATIENT
Start: 2024-04-01

## 2024-04-03 DIAGNOSIS — F43.21 GRIEF REACTION: ICD-10-CM

## 2024-04-03 DIAGNOSIS — I10 BENIGN ESSENTIAL HTN: ICD-10-CM

## 2024-04-03 NOTE — TELEPHONE ENCOUNTER
No care due was identified.  [facility] Embedded Care Due Messages. Reference number: [id number].   4/03/2024 1:16:00 AM CDT

## 2024-04-04 RX ORDER — SERTRALINE HYDROCHLORIDE 100 MG/1
100 TABLET, FILM COATED ORAL NIGHTLY
Qty: 90 TABLET | Refills: 2 | Status: SHIPPED | OUTPATIENT
Start: 2024-04-04

## 2024-04-04 RX ORDER — AMLODIPINE BESYLATE 5 MG/1
TABLET ORAL
Qty: 90 TABLET | Refills: 2 | Status: SHIPPED | OUTPATIENT
Start: 2024-04-04

## 2024-04-04 NOTE — TELEPHONE ENCOUNTER
Refill Decision Note   Hannah Montoya  is requesting a refill authorization.  Brief Assessment and Rationale for Refill:  Approve     Medication Therapy Plan:        Comments:     Note composed:12:49 AM 04/04/2024

## 2024-04-29 ENCOUNTER — HOSPITAL ENCOUNTER (OUTPATIENT)
Dept: RADIOLOGY | Facility: HOSPITAL | Age: 69
Discharge: HOME | End: 2024-04-29
Attending: NURSE PRACTITIONER
Payer: MEDICARE

## 2024-04-29 DIAGNOSIS — C34.91 NSCLC OF RIGHT LUNG: ICD-10-CM

## 2024-04-29 LAB
CREAT SERPL-MCNC: 1.1 MG/DL (ref 0.5–1.4)
SAMPLE: NORMAL

## 2024-04-29 PROCEDURE — 74177 CT ABD & PELVIS W/CONTRAST: CPT | Mod: TC,HCNC

## 2024-04-29 PROCEDURE — 74177 CT ABD & PELVIS W/CONTRAST: CPT | Mod: 26,HCNC,, | Performed by: INTERNAL MEDICINE

## 2024-04-29 PROCEDURE — 25500020 PHARM REV CODE 255: Mod: HCNC | Performed by: NURSE PRACTITIONER

## 2024-04-29 PROCEDURE — 71260 CT THORAX DX C+: CPT | Mod: 26,HCNC,, | Performed by: INTERNAL MEDICINE

## 2024-04-29 RX ADMIN — IOHEXOL 100 ML: 350 INJECTION, SOLUTION INTRAVENOUS at 10:04

## 2024-05-02 ENCOUNTER — OFFICE VISIT (OUTPATIENT)
Dept: HEMATOLOGY/ONCOLOGY | Facility: CLINIC | Age: 69
End: 2024-05-02
Payer: MEDICARE

## 2024-05-02 VITALS — HEIGHT: 62 IN | WEIGHT: 181.44 LBS | BODY MASS INDEX: 33.39 KG/M2

## 2024-05-02 DIAGNOSIS — U07.1 COVID-19 VIRUS INFECTION: ICD-10-CM

## 2024-05-02 DIAGNOSIS — I42.0 DILATED CARDIOMYOPATHY: ICD-10-CM

## 2024-05-02 DIAGNOSIS — Z85.3 HISTORY OF BREAST CANCER IN FEMALE: ICD-10-CM

## 2024-05-02 DIAGNOSIS — J70.0 RADIATION PNEUMONITIS: ICD-10-CM

## 2024-05-02 DIAGNOSIS — M79.2 NEUROPATHIC PAIN: ICD-10-CM

## 2024-05-02 DIAGNOSIS — Z95.810 CARDIAC RESYNCHRONIZATION THERAPY DEFIBRILLATOR (CRT-D) IN PLACE: ICD-10-CM

## 2024-05-02 DIAGNOSIS — K76.9 LIVER DISEASE, UNSPECIFIED: ICD-10-CM

## 2024-05-02 DIAGNOSIS — C34.91 NSCLC OF RIGHT LUNG: Primary | ICD-10-CM

## 2024-05-02 DIAGNOSIS — I44.7 LEFT BUNDLE-BRANCH BLOCK: ICD-10-CM

## 2024-05-02 PROCEDURE — 4010F ACE/ARB THERAPY RXD/TAKEN: CPT | Mod: HCNC,CPTII,S$GLB, | Performed by: INTERNAL MEDICINE

## 2024-05-02 PROCEDURE — 1157F ADVNC CARE PLAN IN RCRD: CPT | Mod: HCNC,CPTII,S$GLB, | Performed by: INTERNAL MEDICINE

## 2024-05-02 PROCEDURE — 99215 OFFICE O/P EST HI 40 MIN: CPT | Mod: HCNC,S$GLB,, | Performed by: INTERNAL MEDICINE

## 2024-05-02 PROCEDURE — 1126F AMNT PAIN NOTED NONE PRSNT: CPT | Mod: HCNC,CPTII,S$GLB, | Performed by: INTERNAL MEDICINE

## 2024-05-02 PROCEDURE — 3288F FALL RISK ASSESSMENT DOCD: CPT | Mod: HCNC,CPTII,S$GLB, | Performed by: INTERNAL MEDICINE

## 2024-05-02 PROCEDURE — 3072F LOW RISK FOR RETINOPATHY: CPT | Mod: HCNC,CPTII,S$GLB, | Performed by: INTERNAL MEDICINE

## 2024-05-02 PROCEDURE — 3008F BODY MASS INDEX DOCD: CPT | Mod: HCNC,CPTII,S$GLB, | Performed by: INTERNAL MEDICINE

## 2024-05-02 PROCEDURE — G2211 COMPLEX E/M VISIT ADD ON: HCPCS | Mod: HCNC,S$GLB,, | Performed by: INTERNAL MEDICINE

## 2024-05-02 PROCEDURE — 1159F MED LIST DOCD IN RCRD: CPT | Mod: HCNC,CPTII,S$GLB, | Performed by: INTERNAL MEDICINE

## 2024-05-02 PROCEDURE — 1160F RVW MEDS BY RX/DR IN RCRD: CPT | Mod: HCNC,CPTII,S$GLB, | Performed by: INTERNAL MEDICINE

## 2024-05-02 PROCEDURE — 99999 PR PBB SHADOW E&M-EST. PATIENT-LVL IV: CPT | Mod: PBBFAC,HCNC,, | Performed by: INTERNAL MEDICINE

## 2024-05-02 PROCEDURE — 1101F PT FALLS ASSESS-DOCD LE1/YR: CPT | Mod: HCNC,CPTII,S$GLB, | Performed by: INTERNAL MEDICINE

## 2024-05-02 RX ORDER — GABAPENTIN 300 MG/1
300 CAPSULE ORAL 3 TIMES DAILY
Qty: 90 CAPSULE | Refills: 11 | Status: SHIPPED | OUTPATIENT
Start: 2024-05-02 | End: 2025-05-02

## 2024-05-02 NOTE — PROGRESS NOTES
ONCOLOGY FOLLOW UP VISIT.     Reason for visit: Imaging review for recurrent stage III NSCLC    Cancer/Stage/TNM:    Cancer Staging   NSCLC of right lung  Staging form: Lung, AJCC 8th Edition  - Clinical stage from 9/29/2020: Stage IIIA (cT3, cN1, cM0) - Signed by Ramo Tucker MD on 10/24/2020         Oncology History   NSCLC of right lung   9/29/2020 Cancer Staged    Staging form: Lung, AJCC 8th Edition  - Clinical stage from 9/29/2020: Stage IIIA (cT3, cN1, cM0)     10/14/2020 Initial Diagnosis    NSCLC of right lung     11/17/2020 - 12/30/2020 Radiation Therapy    Treating physician: Harvinder Lara     Site  Technique  Energy  Dose/Fx (Gy)  #Fx  Total Dose (Gy)    RLL Lung  VMAT  6X  2  30 / 30  60         2/6/2023 -  Chemotherapy    Treatment Summary   Plan Name: OP NSCLC NIVOLUMAB 360 MG D1 & D22 WITH IPILIMUMAB 1 MG/KG Q6W PLUS CARBOPLATIN (AUC) PACLITAXEL Q3W X2 DOSES  Treatment Goal: Control  Status: Active  Start Date: 2/6/2023  End Date: 2/10/2025 (Planned)  Provider: Javi Avina MD  Chemotherapy: CARBOplatin (PARAPLATIN) 525 mg in sodium chloride 0.9% 337.5 mL chemo infusion, 525 mg, Intravenous, Clinic/HOD 1 time, 1 of 1 cycle  Administration: 525 mg (2/6/2023), 490 mg (2/27/2023)  PACLitaxeL (TAXOL) 200 mg/m2 = 396 mg in sodium chloride 0.9% 500 mL chemo infusion, 200 mg/m2 = 396 mg (100 % of original dose 200 mg/m2), Intravenous, Clinic/HOD 1 time, 1 of 1 cycle  Dose modification: 200 mg/m2 (original dose 200 mg/m2, Cycle 1), 200 mg/m2 (original dose 200 mg/m2, Cycle 1)  Administration: 396 mg (2/6/2023), 396 mg (2/27/2023)     6/12/2023 - 6/30/2023 Radiation Therapy    Treating physician: Harvinder Lara    Site Technique Energy Dose/Fx (Gy) #Fx Total Dose (Gy)   RLL Lung RtLung 6X 4 15 / 15 60             Interval History:   Today, patient reports right sided pain near her liver. Pain radiates to the back and right shoulder blade.     ROS:  Review of Systems   Constitutional:  Negative  for chills, fever and weight loss.   HENT:  Negative for hearing loss, nosebleeds and sore throat.    Eyes:  Negative for blurred vision and double vision.   Respiratory:  Negative for cough, hemoptysis, shortness of breath and wheezing.    Cardiovascular:  Negative for chest pain and leg swelling.   Gastrointestinal:  Negative for abdominal pain, blood in stool, diarrhea, heartburn, nausea and vomiting.   Genitourinary:  Negative for dysuria, frequency and hematuria.   Musculoskeletal:  Positive for back pain (right sided). Negative for joint pain and myalgias.        Right shoulder blade pain   Skin:  Negative for itching and rash.   Neurological:  Negative for dizziness, tingling, sensory change, speech change and headaches.   Endo/Heme/Allergies:  Does not bruise/bleed easily.   Psychiatric/Behavioral:  The patient is not nervous/anxious.           A complete 12-point review of systems was reviewed and is negative except as mentioned above.     Past Medical History:   Past Medical History:   Diagnosis Date    AICD (automatic cardioverter/defibrillator) present     Asthma     bronchitis in past    Breast cancer 2016    right    Cardiac pacemaker     Cardiomyopathy     COPD (chronic obstructive pulmonary disease)     Diabetes mellitus, type 2     Hyperglycemia     Hyperlipidemia     Hypertension     Malignant neoplasm of overlapping sites of female breast 2/12/2016    Nuclear sclerosis of both eyes 8/12/2020    Respiratory distress 3/12/2020        Allergies:   Review of patient's allergies indicates:   Allergen Reactions    Taxol [paclitaxel]      Hypersensitivity reaction to taxol, symptoms included shortness of breath, nausea, dizziness, flushing     Carboplatin Other (See Comments)     Itching and hives    Adhesive Rash     tegaderm burns and blistered skin        Medications:   Current Outpatient Medications   Medication Sig Dispense Refill    ACCU-CHEK ALFRED PLUS TEST STRP Strp USE TO TEST THREE TIMES DAILY  200 strip 3    albuterol (ACCUNEB) 1.25 mg/3 mL Nebu use 1 AMPULE (3ml) via NEBULIZER EVERY 6 HOURS AS NEEDED 300 mL 3    albuterol (VENTOLIN HFA) 90 mcg/actuation inhaler Inhale 2 puffs into the lungs every 4 (four) hours as needed for Wheezing or Shortness of Breath. Rescue 18 g 4    amLODIPine (NORVASC) 5 MG tablet TAKE 1 TABLET BY MOUTH EVERY DAY 90 tablet 2    atorvastatin (LIPITOR) 10 MG tablet TAKE 1 TABLET BY MOUTH EVERY DAY 90 tablet 1    benzonatate (TESSALON) 200 MG capsule Take 1 capsule (200 mg total) by mouth 3 (three) times daily as needed for Cough. 30 capsule 1    buPROPion (WELLBUTRIN XL) 150 MG TB24 tablet TAKE 1 TABLET BY MOUTH EVERY DAY 90 tablet 3    cetirizine (ZYRTEC) 10 MG tablet Take 10 mg by mouth every evening.       losartan (COZAAR) 100 MG tablet TAKE 1 TABLET BY MOUTH EVERY DAY 90 tablet 3    metFORMIN (GLUCOPHAGE) 500 MG tablet Take 1 tablet (500 mg total) by mouth 2 (two) times daily with meals. 180 tablet 3    metoprolol succinate (TOPROL-XL) 100 MG 24 hr tablet TAKE 1 TABLET BY MOUTH EVERY DAY 90 tablet 3    multivitamin (THERAGRAN) per tablet Take 1 tablet by mouth once daily.      ondansetron (ZOFRAN-ODT) 8 MG TbDL Take 1 tablet (8 mg total) by mouth every 8 (eight) hours as needed (nausea/vomiting). Take 1 tablet (8 mg) by mouth every 8 hours as needed for nausea/vomiting. 60 tablet 5    pantoprazole (PROTONIX) 40 MG tablet TAKE 1 TABLET BY MOUTH EVERY DAY 90 tablet 3    sertraline (ZOLOFT) 100 MG tablet TAKE 1 TABLET BY MOUTH EVERY DAY IN THE EVENING 90 tablet 2    tiotropium (SPIRIVA WITH HANDIHALER) 18 mcg inhalation capsule INHALE THE CONTENTS OF 1 CAPSULE BY MOUTH EVERY DAY 90 capsule 3    WIXELA INHUB 250-50 mcg/dose diskus inhaler INHALE 1 PUFF INTO THE LUNGS 2 (TWO) TIMES DAILY. CONTROLLER 180 each 3    betamethasone dipropionate 0.05 % cream Apply topically 2 (two) times daily as needed (rash). (Patient not taking: Reported on 5/2/2024) 45 g 2    FLUAD QUAD 2023-24,65Y  "UP,,PF, 60 mcg (15 mcg x 4)/0.5 mL Syrg       gabapentin (NEURONTIN) 300 MG capsule Take 1 capsule (300 mg total) by mouth 3 (three) times daily. 90 capsule 11    methylPREDNISolone (MEDROL DOSEPACK) 4 mg tablet use as directed (Patient not taking: Reported on 5/2/2024) 21 each 0    mupirocin (BACTROBAN) 2 % ointment Apply topically 3 (three) times daily. (Patient not taking: Reported on 5/2/2024) 30 g 1    OMNIPAQUE 300 300 mg iodine/mL injection  (Patient not taking: Reported on 5/2/2024)      OMNIPAQUE 350 350 mg iodine/mL Soln injection  (Patient not taking: Reported on 5/2/2024)      OPDIVO 120 mg/12 mL Soln  (Patient not taking: Reported on 5/2/2024)      OPDIVO 240 mg/24 mL Soln  (Patient not taking: Reported on 5/2/2024)      OPDIVO 40 mg/4 mL injection  (Patient not taking: Reported on 5/2/2024)      palonosetron (ALOXI) 0.25 mg/5 mL injection       READI-CAT 2 2 % (w/v) suspension       semaglutide (OZEMPIC) 0.25 mg or 0.5 mg(2 mg/1.5 mL) pen injector Inject 0.5 mg into the skin every 7 days. (Patient not taking: Reported on 5/2/2024) 1.5 mL 6    triamcinolone acetonide 0.1% (KENALOG) 0.1 % cream APPLY TOPICALLY TWICE A DAY (Patient not taking: Reported on 5/2/2024) 454 g 2    YERVOY 50 mg/10 mL (5 mg/mL)  (Patient not taking: Reported on 5/2/2024)       No current facility-administered medications for this visit.     Facility-Administered Medications Ordered in Other Visits   Medication Dose Route Frequency Provider Last Rate Last Admin    fentaNYL injection 25 mcg  25 mcg Intravenous Q5 Min PRN Keesha Martins MD        haloperidol lactate injection 0.5 mg  0.5 mg Intravenous Once PRN Keesha Martins MD        HYDROmorphone injection 0.2 mg  0.2 mg Intravenous Q5 Min PRN Keesha Martins MD        ondansetron injection 4 mg  4 mg Intravenous Once PRN Keesha Martins MD        sodium chloride 0.9% flush 10 mL  10 mL Intravenous PRN Keesha Martins MD            Physical Exam:   Ht 5' 2" (1.575 m)   Wt 82.3 kg (181 lb 7 oz)  "  LMP  (LMP Unknown)   BMI 33.19 kg/m²      ECOG Performance Status: (foot note - ECOG PS provided by Eastern Cooperative Oncology Group) 0 - Asymptomatic    Physical Exam  Vitals and nursing note reviewed.   Constitutional:       Appearance: Normal appearance. She is well-developed and normal weight.   HENT:      Head: Normocephalic and atraumatic.   Eyes:      Conjunctiva/sclera: Conjunctivae normal.      Pupils: Pupils are equal, round, and reactive to light.   Pulmonary:      Effort: Pulmonary effort is normal. No respiratory distress.   Abdominal:      General: There is no distension.      Palpations: Abdomen is soft.   Musculoskeletal:         General: No swelling. Normal range of motion.      Cervical back: Normal range of motion and neck supple.      Left ankle: Swelling (trace) present.   Lymphadenopathy:      Cervical: No cervical adenopathy.   Skin:     General: Skin is warm and dry.      Findings: No rash.   Neurological:      General: No focal deficit present.      Mental Status: She is alert and oriented to person, place, and time.   Psychiatric:         Mood and Affect: Mood and affect normal.         Behavior: Behavior normal.                 Labs:   No results found for this or any previous visit (from the past 48 hour(s)).             Imaging:   CT Chest Abdomen Pelvis With IV Contrast (XPD) NO Oral Contrast  Narrative: EXAMINATION:  CT CHEST ABDOMEN PELVIS WITH IV CONTRAST (XPD)    CLINICAL HISTORY:  Non-small cell lung cancer (NSCLC), metastatic, assess treatment response; Malignant neoplasm of unspecified part of right bronchus or lung    TECHNIQUE:  Low dose axial images, sagittal and coronal reformations were obtained from the thoracic inlet to the pubic symphysis following the IV administration of 100 mL of Omnipaque 350 and no oral contrast    COMPARISON:  Most recent prior exam, February 14, 2024, PET CT February 2024    FINDINGS:  AICD within the soft tissues of the left chest.  Aorta  demonstrates no aneurysm, with mild calcification along its wall.  There is no significant pericardial effusion or left pleural effusion.  Right chest remains abnormal, with right lower lobe collapse, some mild atelectasis or scarring in the right middle lobe and small amount of right pleural fluid, findings similar to previous exam.  Previously documented mass is not clearly identifiable due to lung collapse.  Small mediastinal lymph nodes appear similar, not enlarged by CT criteria.    The gallbladder has been removed.    The liver again demonstrates a subtle region of hypoattenuation near the gallbladder fossa, measuring up to 1.3 cm.  No new hepatic abnormality is noted.  Liver is mildly enlarged.    The spleen is not enlarged.  There is no significant biliary ductal dilatation.    The stomach is unremarkable.  The pancreas demonstrates no significant abnormality.  There is no adrenal mass.    The kidneys concentrate contrast satisfactorily without hydronephrosis.    The abdominal aorta contains calcification along its wall without aneurysm or significant periaortic lymphadenopathy.  No pelvic lymphadenopathy.    The bladder is unremarkable.  Uterus present.    Bowel demonstrates no distension or adjacent inflammatory change.  Appendix unremarkable.    The osseous structures show degenerative change.  Impression: In this patient with history of non-small cell lung cancer, there is no detrimental interval change compared to previous imaging.  Right lower lobe atelectasis appears similar and previously documented mass in the right lower lobe is not well evaluated due to this atelectasis.    Stable  subtle area of hypoattenuation in the liver adjacent to the gallbladder fossa.  No new abnormality noted.    Additional findings as above    Electronically signed by: Liliana Collins MD  Date:    04/29/2024  Time:    10:36            Diagnoses:       1. NSCLC of right lung    2. Liver disease, unspecified    3.  Radiation pneumonitis    4. COVID-19 virus infection    5. History of breast cancer in female    6. Dilated cardiomyopathy    7. Left bundle-branch block    8. Cardiac resynchronization therapy defibrillator (CRT-D) in place    9. Neuropathic pain        Assessment and Plan:         1-3. Recurrent NSCLC  Plan is for definitive chemoRT, will need re-staging scans prior.  Reviewed with patient in detail her diagnosis, stage, treatment options, and prognosis (3 year OS 66% vs. 43.5% without durvalumab) with standard of care chemoRT followed by maintenance immunotherapy.  Patient has consented for [id number] clinical trial, a randomized control trial evaluating combination chemoRT + durvalumab followed by maintenance vs. SOC chemoRT -> maintenance.  CT scans 7/2021 with CR.  - patient randomized on [id number] to SOC arm (Imfinzi) - completed 12/2021.    - CT scan 12/2022 with progressing pulmonary nodule in LLL, still with stable disease in RLL consolidation. PET scan also showing enlarging right lower lobe mass with increased hypermetabolic activity concerning for recurrence. Will present her case at the next thoracic tumor board to discuss biopsy and possible treatment options.   - Biopsy of lung mass consistent with SCC. Initiated on 9LA. Initial re-staging scans with stable disease.   - Patient has completed RT 4-5 weeks ago and has no symptoms. She has some mild inflammation on Chest CT, but since asymptomatic and completed RT will re-initiate IO.  - Now s/p 3 cycles of 9LA. With persistent IO induced rash and continued pneumonitis on recent imaging, plan to hold IO and proceed with surveillance.  - 10/16/23 CT showing continued evidence of inflammation/infection. Currently has cold symptoms. +Covid.   - Repeat CT scan (preliminary reading) shows improvement in inflammation but indeterminate possible new liver metastases. Will confirm with PETCT.  - CT CAP (4/29/24) is showing stable disease compared to previous PET scan.  Will continue with Q3 months surveillance.       4.  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      5-8.  Stable, follows with cardiology. AICD placed, but patient now has recovered EF and only takes lasix prn.  NYHA class I.    9. Will increase gabapentin. Likely referred pain from cholecystectomy.      Patient was also seen and examined by Dr. Avina. Patient is in agreement with the proposed treatment plan. All questions were answered to the patient's satisfaction. Pt knows to call clinic if anything is needed before the next clinic visit.    Aide aMgaña, MSN, APRN, FNP-C  Hematology and Medical Oncology  Nurse Practitioner to Dr. Javi Avina  Nurse Practitioner, [facility]      I have reviewed the notes, assessments, and/or procedures performed by Aide MONTELONGO, as above.  I have personally interviewed and examined the patient at the beside, and rounded with Aide. I concur with her assessment and plan and the documentation of Hannah Montoya.    MDM includes:    - Acute or chronic illness or injury that poses a threat to life or bodily function  - Independent review and explanation of 2 results from unique tests  - Discussion of management and ordering 2 unique tests  - Extensive discussion of treatment and management  - Prescription drug management  - Drug therapy requiring intensive monitoring for toxicity    Javi Avina M.D.  Hematology/Oncology Attending  Director [facility]  [facility]          Route Chart for Scheduling    Med Onc Chart Routing      Follow up with physician 3 months. review imaging   Follow up with DANIEL    Infusion scheduling note    Injection scheduling note    Labs CBC and CMP   Scheduling:  Preferred lab:  Lab interval:     Imaging CT chest abdomen pelvis and other   CT CAP and CT head in 3 months   Pharmacy appointment    Other referrals              Treatment Plan Information    OP NSCLC NIVOLUMAB 360 MG D1 & D22 WITH IPILIMUMAB 1 MG/KG Q6W PLUS CARBOPLATIN (AUC) PACLITAXEL Q3W X2 DOSES   Javi Avina MD   Upcoming Treatment Dates - OP NSCLC NIVOLUMAB 360 MG D1 & D22 WITH IPILIMUMAB 1 MG/KG Q6W PLUS CARBOPLATIN (AUC) PACLITAXEL Q3W X2 DOSES    8/14/2023       Immunotherapy       nivolumab 360 mg in sodium chloride 0.9% 86 mL infusion  9/4/2023       Immunotherapy       nivolumab 360 mg in sodium chloride 0.9% 86 mL infusion       ipilimumab (YERVOY) 1 mg/kg = 81 mg in sodium chloride 0.9% 66.2 mL chemo infusion  9/25/2023       Immunotherapy       nivolumab 360 mg in sodium chloride 0.9% 86 mL infusion  10/16/2023       Immunotherapy       nivolumab 360 mg in sodium chloride 0.9% 86 mL infusion       ipilimumab (YERVOY) 1 mg/kg = 81 mg in sodium chloride 0.9% 66.2 mL chemo infusion

## 2024-05-05 DIAGNOSIS — E11.9 TYPE 2 DIABETES MELLITUS WITHOUT COMPLICATION, WITHOUT LONG-TERM CURRENT USE OF INSULIN: ICD-10-CM

## 2024-05-06 RX ORDER — METFORMIN HYDROCHLORIDE 500 MG/1
1000 TABLET ORAL 2 TIMES DAILY WITH MEALS
Qty: 360 TABLET | Refills: 3 | Status: SHIPPED | OUTPATIENT
Start: 2024-05-06

## 2024-05-11 ENCOUNTER — CLINICAL SUPPORT (OUTPATIENT)
Dept: CARDIOLOGY | Facility: HOSPITAL | Age: 69
End: 2024-05-11
Attending: INTERNAL MEDICINE
Payer: MEDICARE

## 2024-05-11 ENCOUNTER — CLINICAL SUPPORT (OUTPATIENT)
Dept: CARDIOLOGY | Facility: HOSPITAL | Age: 69
End: 2024-05-11
Payer: MEDICARE

## 2024-05-11 DIAGNOSIS — Z95.810 PRESENCE OF AUTOMATIC (IMPLANTABLE) CARDIAC DEFIBRILLATOR: ICD-10-CM

## 2024-05-11 PROCEDURE — 93295 DEV INTERROG REMOTE 1/2/MLT: CPT | Mod: HCNC,,, | Performed by: INTERNAL MEDICINE

## 2024-05-11 PROCEDURE — 93296 REM INTERROG EVL PM/IDS: CPT | Mod: HCNC | Performed by: INTERNAL MEDICINE

## 2024-05-14 ENCOUNTER — PATIENT OUTREACH (OUTPATIENT)
Dept: ADMINISTRATIVE | Facility: HOSPITAL | Age: 69
End: 2024-05-14
Payer: MEDICARE

## 2024-05-14 DIAGNOSIS — E11.9 TYPE 2 DIABETES MELLITUS WITHOUT COMPLICATION, WITHOUT LONG-TERM CURRENT USE OF INSULIN: Primary | ICD-10-CM

## 2024-05-14 RX ORDER — BARIUM SULFATE 2 %
SUSPENSION, ORAL (FINAL DOSE FORM) ORAL
COMMUNITY
Start: 2024-02-14

## 2024-05-14 NOTE — PROGRESS NOTES
Page April [employer] Gap Report 05.06.24 Colorectal Screening - the patient declined a colonoscopy / alternative screening at this time.    Page April [facility] Gap Report 05.06.24 A1C, Urine Microalbumin, & CMP - CMP completed on 04/29/2024 / A1C appointment scheduled on 06/08/2024 / Urine Microalbumin completed on 1016/2023.

## 2024-05-30 LAB
OHS CV AF BURDEN PERCENT: < 1
OHS CV DC REMOTE DEVICE TYPE: NORMAL
OHS CV RV PACING PERCENT: 96.3 %

## 2024-06-06 ENCOUNTER — LAB VISIT (OUTPATIENT)
Dept: LAB | Facility: HOSPITAL | Age: 69
End: 2024-06-06
Attending: FAMILY MEDICINE
Payer: MEDICARE

## 2024-06-06 DIAGNOSIS — E11.9 TYPE 2 DIABETES MELLITUS WITHOUT COMPLICATION, WITHOUT LONG-TERM CURRENT USE OF INSULIN: ICD-10-CM

## 2024-06-06 LAB
CHOLEST SERPL-MCNC: 128 MG/DL (ref 120–199)
CHOLEST/HDLC SERPL: 3.4 {RATIO} (ref 2–5)
ESTIMATED AVG GLUCOSE: 134 MG/DL (ref 68–131)
HBA1C MFR BLD: 6.3 % (ref 4–5.6)
HDLC SERPL-MCNC: 38 MG/DL (ref 40–75)
HDLC SERPL: 29.7 % (ref 20–50)
LDLC SERPL CALC-MCNC: 54.6 MG/DL (ref 63–159)
NONHDLC SERPL-MCNC: 90 MG/DL
TRIGL SERPL-MCNC: 177 MG/DL (ref 30–150)

## 2024-06-06 PROCEDURE — 83036 HEMOGLOBIN GLYCOSYLATED A1C: CPT | Mod: HCNC | Performed by: FAMILY MEDICINE

## 2024-06-06 PROCEDURE — 36415 COLL VENOUS BLD VENIPUNCTURE: CPT | Mod: HCNC,PO | Performed by: FAMILY MEDICINE

## 2024-06-06 PROCEDURE — 80061 LIPID PANEL: CPT | Mod: HCNC | Performed by: FAMILY MEDICINE

## 2024-07-01 DIAGNOSIS — E11.9 TYPE 2 DIABETES MELLITUS WITHOUT COMPLICATION, WITHOUT LONG-TERM CURRENT USE OF INSULIN: ICD-10-CM

## 2024-07-01 RX ORDER — ATORVASTATIN CALCIUM 10 MG/1
10 TABLET, FILM COATED ORAL
Qty: 90 TABLET | Refills: 1 | Status: SHIPPED | OUTPATIENT
Start: 2024-07-01

## 2024-07-01 NOTE — TELEPHONE ENCOUNTER
No care due was identified.  [facility] Embedded Care Due Messages. Reference number: [id number].   7/01/2024 1:01:51 AM CDT

## 2024-07-01 NOTE — TELEPHONE ENCOUNTER
Refill Decision Note   Hannah Montoya  is requesting a refill authorization.  Brief Assessment and Rationale for Refill:  Approve     Medication Therapy Plan:         Comments:     Note composed:3:09 AM 07/01/2024

## 2024-07-31 ENCOUNTER — HOSPITAL ENCOUNTER (OUTPATIENT)
Dept: RADIOLOGY | Facility: HOSPITAL | Age: 69
Discharge: HOME | End: 2024-07-31
Attending: NURSE PRACTITIONER
Payer: MEDICARE

## 2024-07-31 ENCOUNTER — HOSPITAL ENCOUNTER (OUTPATIENT)
Dept: RADIOLOGY | Facility: HOSPITAL | Age: 69
Discharge: HOME | End: 2024-07-31
Attending: INTERNAL MEDICINE
Payer: MEDICARE

## 2024-07-31 DIAGNOSIS — C34.91 NSCLC OF RIGHT LUNG: ICD-10-CM

## 2024-07-31 PROCEDURE — 70470 CT HEAD/BRAIN W/O & W/DYE: CPT | Mod: TC,HCNC

## 2024-07-31 PROCEDURE — 25500020 PHARM REV CODE 255: Mod: HCNC | Performed by: INTERNAL MEDICINE

## 2024-07-31 PROCEDURE — 74177 CT ABD & PELVIS W/CONTRAST: CPT | Mod: TC,HCNC

## 2024-07-31 PROCEDURE — 74177 CT ABD & PELVIS W/CONTRAST: CPT | Mod: 26,HCNC,, | Performed by: RADIOLOGY

## 2024-07-31 PROCEDURE — 71260 CT THORAX DX C+: CPT | Mod: 26,HCNC,, | Performed by: RADIOLOGY

## 2024-07-31 PROCEDURE — 70470 CT HEAD/BRAIN W/O & W/DYE: CPT | Mod: 26,HCNC,, | Performed by: RADIOLOGY

## 2024-07-31 RX ADMIN — IOHEXOL 85 ML: 350 INJECTION, SOLUTION INTRAVENOUS at 11:07

## 2024-08-01 ENCOUNTER — TELEPHONE (OUTPATIENT)
Dept: HEMATOLOGY/ONCOLOGY | Facility: CLINIC | Age: 69
End: 2024-08-01
Payer: MEDICARE

## 2024-08-01 NOTE — TELEPHONE ENCOUNTER
----- Message from Madalyn Herrera MA sent at 8/1/2024  8:13 AM CDT -----  Regarding: RE: Change to Virtual Appt  Contact: Pt @ [phone]  I changed it to virtual. Do you mind letting the patient know?  ----- Message -----  From: Aide Magaña NP  Sent: 8/1/2024   8:10 AM CDT  To: Javi Avina MD; Jessie Shelton; #  Subject: RE: Change to Virtual Appt                       Virtual is ok  ----- Message -----  From: Madalyn Herrera MA  Sent: 7/31/2024   4:04 PM CDT  To: Aide Magaña NP; Javi Avina MD; #  Subject: RE: Change to Virtual Appt                       Adding Aide  ----- Message -----  From: Jessie Shelton  Sent: 7/31/2024   3:14 PM CDT  To: Javi Avina MD; Fabrice Caldwell  Staff  Subject: FW: Change to Virtual Appt                       Hi.    Patient is asking to change 8/2 appt from in person to virtual. Is that okay?? It would be for the same day but I see patient did labs and scans today. That way I can let patient know.  ----- Message -----  From: Kristine Kenney  Sent: 7/31/2024  11:58 AM CDT  To: [facility]  Pool  Subject: Change to Virtual Appt                           Pt  is asking to speak to someone in the office to change appt to virtual that is scheduled on Friday 8/2. Pt is asking for a return call first before making changes. Please call. Thanks.

## 2024-08-02 ENCOUNTER — OFFICE VISIT (OUTPATIENT)
Dept: HEMATOLOGY/ONCOLOGY | Facility: CLINIC | Age: 69
End: 2024-08-02
Payer: MEDICARE

## 2024-08-02 DIAGNOSIS — M25.552 LEFT HIP PAIN: ICD-10-CM

## 2024-08-02 DIAGNOSIS — C34.91 NSCLC OF RIGHT LUNG: Primary | ICD-10-CM

## 2024-08-02 DIAGNOSIS — U07.1 COVID-19 VIRUS INFECTION: ICD-10-CM

## 2024-08-02 DIAGNOSIS — R53.83 FATIGUE, UNSPECIFIED TYPE: ICD-10-CM

## 2024-08-02 DIAGNOSIS — I42.0 DILATED CARDIOMYOPATHY: ICD-10-CM

## 2024-08-02 DIAGNOSIS — I44.7 LEFT BUNDLE-BRANCH BLOCK: ICD-10-CM

## 2024-08-02 DIAGNOSIS — J70.0 RADIATION PNEUMONITIS: ICD-10-CM

## 2024-08-02 DIAGNOSIS — Z95.810 CARDIAC RESYNCHRONIZATION THERAPY DEFIBRILLATOR (CRT-D) IN PLACE: ICD-10-CM

## 2024-08-02 DIAGNOSIS — Z85.3 HISTORY OF BREAST CANCER IN FEMALE: ICD-10-CM

## 2024-08-02 NOTE — PROGRESS NOTES
ONCOLOGY FOLLOW UP VISIT.     The patient location is: home  The chief complaint leading to consultation is: Imaging review for metastatic NSCLC    Visit type: audiovisual    Face to Face time with patient: 15  30 minutes of total time spent on the encounter, which includes face to face time and non-face to face time preparing to see the patient (eg, review of tests), Obtaining and/or reviewing separately obtained history, Documenting clinical information in the electronic or other health record, Independently interpreting results (not separately reported) and communicating results to the patient/family/caregiver, or Care coordination (not separately reported).     Each patient to whom he or she provides medical services by telemedicine is:  (1) informed of the relationship between the physician and patient and the respective role of any other health care provider with respect to management of the patient; and (2) notified that he or she may decline to receive medical services by telemedicine and may withdraw from such care at any time.    Cancer/Stage/TNM:    Cancer Staging   NSCLC of right lung  Staging form: Lung, AJCC 8th Edition  - Clinical stage from 9/29/2020: Stage IIIA (cT3, cN1, cM0) - Signed by Ramo Tucker MD on 10/24/2020  - Clinical stage from 7/31/2024: Stage DEREK (cT3, cN2, cM1a) - Signed by Javi Avina MD on 8/2/2024         Oncology History   NSCLC of right lung   9/29/2020 Cancer Staged    Staging form: Lung, AJCC 8th Edition  - Clinical stage from 9/29/2020: Stage IIIA (cT3, cN1, cM0)     10/14/2020 Initial Diagnosis    NSCLC of right lung     11/17/2020 - 12/30/2020 Radiation Therapy    Treating physician: Harvinder Lara     Site  Technique  Energy  Dose/Fx (Gy)  #Fx  Total Dose (Gy)    RLL Lung  VMAT  6X  2  30 / 30  60         2/6/2023 -  Chemotherapy    Treatment Summary   Plan Name: OP NSCLC NIVOLUMAB 360 MG D1 & D22 WITH IPILIMUMAB 1 MG/KG Q6W PLUS CARBOPLATIN (AUC)  PACLITAXEL Q3W X2 DOSES  Treatment Goal: Control  Status: Active  Start Date: 2/6/2023  End Date: 2/10/2025 (Planned)  Provider: Javi Avina MD  Chemotherapy: CARBOplatin (PARAPLATIN) 525 mg in sodium chloride 0.9% 337.5 mL chemo infusion, 525 mg, Intravenous, Clinic/HOD 1 time, 1 of 1 cycle  Administration: 525 mg (2/6/2023), 490 mg (2/27/2023)  PACLitaxeL (TAXOL) 200 mg/m2 = 396 mg in sodium chloride 0.9% 500 mL chemo infusion, 200 mg/m2 = 396 mg (100 % of original dose 200 mg/m2), Intravenous, Clinic/HOD 1 time, 1 of 1 cycle  Dose modification: 200 mg/m2 (original dose 200 mg/m2, Cycle 1), 200 mg/m2 (original dose 200 mg/m2, Cycle 1)  Administration: 396 mg (2/6/2023), 396 mg (2/27/2023)     6/12/2023 - 6/30/2023 Radiation Therapy    Treating physician: Harvinder Lara    Site Technique Energy Dose/Fx (Gy) #Fx Total Dose (Gy)   RLL Lung RtLung 6X 4 15 / 15 60        7/31/2024 Cancer Staged    Staging form: Lung, AJCC 8th Edition  - Clinical stage from 7/31/2024: Stage DEREK (cT3, cN2, cM1a)          Interval History:   Today, patient reports left hip pain that developed a few weeks ago. She is now requiring a cane.    ROS:  Review of Systems   Constitutional:  Negative for chills, fever and weight loss.   HENT:  Negative for hearing loss, nosebleeds and sore throat.    Eyes:  Negative for blurred vision and double vision.   Respiratory:  Negative for cough, hemoptysis, shortness of breath and wheezing.    Cardiovascular:  Negative for chest pain and leg swelling.   Gastrointestinal:  Negative for abdominal pain, blood in stool, diarrhea, heartburn, nausea and vomiting.   Genitourinary:  Negative for dysuria, frequency and hematuria.   Musculoskeletal:  Positive for back pain (right sided) and joint pain (L hip pain). Negative for myalgias.        Right shoulder blade pain   Skin:  Negative for itching and rash.   Neurological:  Negative for dizziness, tingling, sensory change, speech change and headaches.    Endo/Heme/Allergies:  Does not bruise/bleed easily.   Psychiatric/Behavioral:  The patient is not nervous/anxious.           A complete 12-point review of systems was reviewed and is negative except as mentioned above.     Past Medical History:   Past Medical History:   Diagnosis Date    AICD (automatic cardioverter/defibrillator) present     Asthma     bronchitis in past    Breast cancer 2016    right    Cardiac pacemaker     Cardiomyopathy     COPD (chronic obstructive pulmonary disease)     Diabetes mellitus, type 2     Hyperglycemia     Hyperlipidemia     Hypertension     Malignant neoplasm of overlapping sites of female breast 2/12/2016    Nuclear sclerosis of both eyes 8/12/2020    Respiratory distress 3/12/2020        Allergies:   Review of patient's allergies indicates:   Allergen Reactions    Taxol [paclitaxel]      Hypersensitivity reaction to taxol, symptoms included shortness of breath, nausea, dizziness, flushing     Carboplatin Other (See Comments)     Itching and hives    Adhesive Rash     tegaderm burns and blistered skin        Medications:   Current Outpatient Medications   Medication Sig Dispense Refill    ACCU-CHEK ALFRED PLUS TEST STRP Strp USE TO TEST THREE TIMES DAILY 200 strip 3    albuterol (ACCUNEB) 1.25 mg/3 mL Nebu use 1 AMPULE (3ml) via NEBULIZER EVERY 6 HOURS AS NEEDED 300 mL 3    albuterol (VENTOLIN HFA) 90 mcg/actuation inhaler Inhale 2 puffs into the lungs every 4 (four) hours as needed for Wheezing or Shortness of Breath. Rescue 18 g 4    amLODIPine (NORVASC) 5 MG tablet TAKE 1 TABLET BY MOUTH EVERY DAY 90 tablet 2    atorvastatin (LIPITOR) 10 MG tablet TAKE 1 TABLET BY MOUTH EVERY DAY 90 tablet 1    benzonatate (TESSALON) 200 MG capsule Take 1 capsule (200 mg total) by mouth 3 (three) times daily as needed for Cough. 30 capsule 1    betamethasone dipropionate 0.05 % cream Apply topically 2 (two) times daily as needed (rash). (Patient not taking: Reported on  5/2/2024) 45 g 2    buPROPion (WELLBUTRIN XL) 150 MG TB24 tablet TAKE 1 TABLET BY MOUTH EVERY DAY 90 tablet 3    cetirizine (ZYRTEC) 10 MG tablet Take 10 mg by mouth every evening.       FLUAD QUAD 2023-24,65Y UP,,PF, 60 mcg (15 mcg x 4)/0.5 mL Syrg       gabapentin (NEURONTIN) 300 MG capsule Take 1 capsule (300 mg total) by mouth 3 (three) times daily. 90 capsule 11    losartan (COZAAR) 100 MG tablet TAKE 1 TABLET BY MOUTH EVERY DAY 90 tablet 3    metFORMIN (GLUCOPHAGE) 500 MG tablet TAKE 2 TABLETS BY MOUTH TWICE A DAY WITH MEALS 360 tablet 3    methylPREDNISolone (MEDROL DOSEPACK) 4 mg tablet use as directed (Patient not taking: Reported on 5/2/2024) 21 each 0    metoprolol succinate (TOPROL-XL) 100 MG 24 hr tablet TAKE 1 TABLET BY MOUTH EVERY DAY 90 tablet 3    multivitamin (THERAGRAN) per tablet Take 1 tablet by mouth once daily.      mupirocin (BACTROBAN) 2 % ointment Apply topically 3 (three) times daily. (Patient not taking: Reported on 5/2/2024) 30 g 1    OMNIPAQUE 300 300 mg iodine/mL injection  (Patient not taking: Reported on 5/2/2024)      OMNIPAQUE 350 350 mg iodine/mL Soln injection  (Patient not taking: Reported on 5/2/2024)      ondansetron (ZOFRAN-ODT) 8 MG TbDL Take 1 tablet (8 mg total) by mouth every 8 (eight) hours as needed (nausea/vomiting). Take 1 tablet (8 mg) by mouth every 8 hours as needed for nausea/vomiting. 60 tablet 5    OPDIVO 120 mg/12 mL Soln  (Patient not taking: Reported on 5/2/2024)      OPDIVO 240 mg/24 mL Soln  (Patient not taking: Reported on 5/2/2024)      OPDIVO 40 mg/4 mL injection  (Patient not taking: Reported on 5/2/2024)      palonosetron (ALOXI) 0.25 mg/5 mL injection       pantoprazole (PROTONIX) 40 MG tablet TAKE 1 TABLET BY MOUTH EVERY DAY 90 tablet 3    READI-CAT 2 2 % (w/v) suspension       READI-CAT 2 2.1 % (w/v), 2.0 % (w/w) suspension       semaglutide (OZEMPIC) 0.25 mg or 0.5 mg(2 mg/1.5 mL) pen injector Inject 0.5 mg into the skin every  7 days. (Patient not taking: Reported on 5/2/2024) 1.5 mL 6    sertraline (ZOLOFT) 100 MG tablet TAKE 1 TABLET BY MOUTH EVERY DAY IN THE EVENING 90 tablet 2    tiotropium (SPIRIVA WITH HANDIHALER) 18 mcg inhalation capsule INHALE THE CONTENTS OF 1 CAPSULE BY MOUTH EVERY DAY 90 capsule 3    triamcinolone acetonide 0.1% (KENALOG) 0.1 % cream APPLY TOPICALLY TWICE A DAY (Patient not taking: Reported on 5/2/2024) 454 g 2    WIXELA INHUB 250-50 mcg/dose diskus inhaler INHALE 1 PUFF INTO THE LUNGS 2 (TWO) TIMES DAILY. CONTROLLER 180 each 3    YERVOY 50 mg/10 mL (5 mg/mL)  (Patient not taking: Reported on 5/2/2024)       No current facility-administered medications for this visit.     Facility-Administered Medications Ordered in Other Visits   Medication Dose Route Frequency Provider Last Rate Last Admin    fentaNYL injection 25 mcg  25 mcg Intravenous Q5 Min PRN Keesha Martins MD        haloperidol lactate injection 0.5 mg  0.5 mg Intravenous Once PRN Keesha Martins MD        HYDROmorphone injection 0.2 mg  0.2 mg Intravenous Q5 Min PRN Keesha Martins MD        ondansetron injection 4 mg  4 mg Intravenous Once PRN Keesha Martins MD        sodium chloride 0.9% flush 10 mL  10 mL Intravenous PRN Keesha Martins MD            Physical Exam:   LMP  (LMP Unknown)      ECOG Performance Status: (foot note - ECOG PS provided by Eastern Cooperative Oncology Group) 0 - Asymptomatic    Physical Exam  Vitals and nursing note reviewed.   Constitutional:       Appearance: Normal appearance. She is well-developed and normal weight.   HENT:      Head: Normocephalic and atraumatic.   Eyes:      Conjunctiva/sclera: Conjunctivae normal.      Pupils: Pupils are equal, round, and reactive to light.   Pulmonary:      Effort: Pulmonary effort is normal. No respiratory distress.   Abdominal:      General: There is no distension.      Palpations: Abdomen is soft.   Musculoskeletal:         General: No swelling. Normal range of motion.      Cervical  back: Normal range of motion and neck supple.      Left ankle: Swelling (trace) present.   Lymphadenopathy:      Cervical: No cervical adenopathy.   Skin:     General: Skin is warm and dry.      Findings: No rash.   Neurological:      General: No focal deficit present.      Mental Status: She is alert and oriented to person, place, and time.   Psychiatric:         Mood and Affect: Mood and affect normal.         Behavior: Behavior normal.               Labs:   No results found for this or any previous visit (from the past 48 hour(s)).             Imaging:   CT Chest Abdomen Pelvis With IV Contrast (XPD) NO Oral Contrast  Narrative: EXAMINATION:  CT CHEST ABDOMEN PELVIS WITH IV CONTRAST (XPD)    CLINICAL HISTORY:  Non-small cell lung cancer (NSCLC), metastatic, assess treatment response;Malignant neoplasm of unspecified part of right bronchus or lung    TECHNIQUE:  Postcontrast images were obtained through the chest, abdomen, and pelvis per protocol.  Coronal and sagittal images were reviewed.  85 cc intravenous contrast was administered.    COMPARISON:  CT chest abdomen pelvis dated 04/29/2024    FINDINGS:  Chest:    Structures at the base of the neck are unremarkable.  No axillary adenopathy.  Left chest cardiac device.  Modest aortic arch calcification.  No anterior pericardial effusion.  There is enlarged right hilar node measuring 1.7 cm short axis (series 6, image 243), previously 0.6 cm by my measurement.  Precarinal node measuring 1.4 cm (series 6, image 223, previously 1.1 cm by my measurement.  The trachea is clear.    Persistent changes of right lower lobe volume loss and collapse with pleural effusion.  Accurate delineation of previously documented known mass is otherwise difficult.  There is some increased soft tissue attenuation about the right medial lower lobe segmental arterial branch with mild degree of narrowing (for reference series 6, image 278).    Abdomen and pelvis:    Gallbladder is absent.   Liver is mildly enlarged without focal lesion.  The spleen, pancreas, and right adrenal gland appear normal.  Stable mild thickening at the left adrenal gland.  Kidneys enhance symmetrically without hydronephrosis.  Urinary bladder is mostly decompressed.  Uterus is present with few punctate calcifications.    The GI tract and appendix are normal in caliber.  No significant free fluid or pathologic adenopathy.  Scattered aortoiliac atherosclerosis.  Tiny fat containing umbilical hernia.    Multilevel spine and hip DJD.  No aggressive osseous lesion.  Impression: Patient with known history of non-small cell lung cancer.  Continued degree of right lower lobe volume loss/collapse with pleural fluid.  Again, this limits clear delineation of previously documented known mass.  Of note, there is some increased soft tissue attenuation about the right medial lower lobe segmental arterial branch with mild degree of narrowing.  This in combination with worsening right hilar adenopathy is concerning for worsening disease.    Additional findings as above.    This report was flagged in Epic as abnormal.    Electronically signed by: Juvenal Blanton  Date:    07/31/2024  Time:    15:11  CT Head W Wo Contrast  Narrative: EXAMINATION:  CT HEAD WITH AND WITHOUT    CLINICAL HISTORY:  Metastatic disease evaluation;  Malignant neoplasm of unspecified part of right bronchus or lung    TECHNIQUE:  Axial low-dose images, coronal and sagittal reformations were performed through the paranasal sinuses.  Pre and postcontrast images were obtained.  Coronal and sagittal images were reviewed.    COMPARISON:  CT head with without contrast dated 07/31/2024    FINDINGS:  No evidence for acute major vascular distribution infarct, intracranial hemorrhage, or extra-axial collection.  No midline shift or mass effect.  No hydrocephalus.  Stable appearance of encephalomalacia involving the right parietal and posterior temporal lobes.  Patchy  supratentorial white matter hypoattenuation, nonspecific though can be seen with sequela of chronic microangiopathic change.  Postcontrast images demonstrate no abnormal parenchymal or dural enhancement.  Visualized paranasal sinuses and bilateral mastoid air cells are clear.  No acute calvarial abnormality.  Impression: No evidence for intracranial metastasis.    Stable appearing encephalomalacia at the right parietal and posterior temporal lobes.    Electronically signed by: Juvenal Blanton  Date:    07/31/2024  Time:    12:26            Diagnoses:       1. NSCLC of right lung    2. Left hip pain    3. Radiation pneumonitis    4. COVID-19 virus infection    5. History of breast cancer in female    6. Dilated cardiomyopathy    7. Left bundle-branch block    8. Cardiac resynchronization therapy defibrillator (CRT-D) in place        Assessment and Plan:         1. Recurrent NSCLC  Plan is for definitive chemoRT, will need re-staging scans prior.  Reviewed with patient in detail her diagnosis, stage, treatment options, and prognosis (3 year OS 66% vs. 43.5% without durvalumab) with standard of care chemoRT followed by maintenance immunotherapy.  Patient has consented for [id number] clinical trial, a randomized control trial evaluating combination chemoRT + durvalumab followed by maintenance vs. SOC chemoRT -> maintenance.  CT scans 7/2021 with CR.  - patient randomized on [id number] to SOC arm (Imfinzi) - completed 12/2021.    - CT scan 12/2022 with progressing pulmonary nodule in LLL, still with stable disease in RLL consolidation. PET scan also showing enlarging right lower lobe mass with increased hypermetabolic activity concerning for recurrence. Will present her case at the next thoracic tumor board to discuss biopsy and possible treatment options.   - Biopsy of lung mass consistent with SCC. Initiated on 9LA. Initial re-staging scans with stable disease.   - Patient has completed RT 4-5 weeks ago and has no symptoms.  She has some mild inflammation on Chest CT, but since asymptomatic and completed RT will re-initiate IO.  - Now s/p 3 cycles of 9LA. With persistent IO induced rash and continued pneumonitis on recent imaging, plan to hold IO and proceed with surveillance.  - 10/16/23 CT showing continued evidence of inflammation/infection. Currently has cold symptoms. +Covid.   - Repeat CT scan (preliminary reading) shows improvement in inflammation but indeterminate possible new liver metastases. Will confirm with PETCT.  - CT CAP is showing progression of disease in L hilum, hilar LN, mediastinal LN, and pleural effusion. Will evaluate for clinical trials. If no trials are available, will re-start immunotherapy with ipi nivo.       2 Patient can not get MRIs due to pacemaker, will get CT hip with and without contrast.    3,4 Resolved    5.  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      6-8.  Stable, follows with cardiology. AICD placed, but patient now has recovered EF and only takes lasix prn.  NYHA class I.      MDM includes:    - Acute or chronic illness or injury that poses a threat to life or bodily function  - Independent review and explanation of 2 results from unique tests  - Discussion of management and ordering 2 unique tests  - Extensive discussion of treatment and management  - Prescription drug management    Javi Avina M.D.  Hematology/Oncology Attending  Director [facility]  [facility]          Route Chart for Scheduling    Med Onc Chart Routing      Follow up with physician Other. RTC with labs prior to next cycle of yervoy and Opdivo   Follow up with DANIEL    Infusion scheduling note New or changed treatment   Start authorization on prior treatment plan of Yervoy and Opdivo. Schedule C1 in 2 weeks if approved   Injection scheduling note    Labs CBC, CMP, free T4 and TSH   Scheduling:  Preferred lab:  Lab interval:     Imaging    Pharmacy appointment     Other referrals          Treatment Plan Information   OP NSCLC NIVOLUMAB 360 MG D1 & D22 WITH IPILIMUMAB 1 MG/KG Q6W PLUS CARBOPLATIN (AUC) PACLITAXEL Q3W X2 DOSES   Javi Avina MD   Upcoming Treatment Dates - OP NSCLC NIVOLUMAB 360 MG D1 & D22 WITH IPILIMUMAB 1 MG/KG Q6W PLUS CARBOPLATIN (AUC) PACLITAXEL Q3W X2 DOSES    8/14/2023       Immunotherapy       nivolumab 360 mg in sodium chloride 0.9% 86 mL infusion  9/4/2023       Immunotherapy       nivolumab 360 mg in sodium chloride 0.9% 86 mL infusion       ipilimumab (YERVOY) 1 mg/kg = 81 mg in sodium chloride 0.9% 66.2 mL chemo infusion  9/25/2023       Immunotherapy       nivolumab 360 mg in sodium chloride 0.9% 86 mL infusion  10/16/2023       Immunotherapy       nivolumab 360 mg in sodium chloride 0.9% 86 mL infusion       ipilimumab (YERVOY) 1 mg/kg = 81 mg in sodium chloride 0.9% 66.2 mL chemo infusion

## 2024-08-05 DIAGNOSIS — M25.552 PAIN OF LEFT HIP: Primary | ICD-10-CM

## 2024-08-06 ENCOUNTER — TELEPHONE (OUTPATIENT)
Dept: HEMATOLOGY/ONCOLOGY | Facility: CLINIC | Age: 69
End: 2024-08-06
Payer: MEDICARE

## 2024-08-08 DIAGNOSIS — M25.562 LEFT KNEE PAIN, UNSPECIFIED CHRONICITY: Primary | ICD-10-CM

## 2024-08-09 ENCOUNTER — OFFICE VISIT (OUTPATIENT)
Dept: ORTHOPEDICS | Facility: CLINIC | Age: 69
End: 2024-08-09
Payer: MEDICARE

## 2024-08-09 DIAGNOSIS — M25.562 LEFT KNEE PAIN, UNSPECIFIED CHRONICITY: ICD-10-CM

## 2024-08-09 DIAGNOSIS — M25.552 PAIN OF LEFT HIP: Primary | ICD-10-CM

## 2024-08-09 PROCEDURE — 99999 PR PBB SHADOW E&M-EST. PATIENT-LVL IV: CPT | Mod: PBBFAC,HCNC,, | Performed by: ORTHOPAEDIC SURGERY

## 2024-08-09 RX ORDER — MELOXICAM 15 MG/1
15 TABLET ORAL DAILY
Qty: 30 TABLET | Refills: 0 | Status: SHIPPED | OUTPATIENT
Start: 2024-08-09

## 2024-08-09 RX ORDER — TRIAMCINOLONE ACETONIDE 40 MG/ML
40 INJECTION, SUSPENSION INTRA-ARTICULAR; INTRAMUSCULAR
Status: DISCONTINUED | OUTPATIENT
Start: 2024-08-09 | End: 2024-08-09 | Stop reason: HOSPADM

## 2024-08-09 RX ADMIN — TRIAMCINOLONE ACETONIDE 40 MG: 40 INJECTION, SUSPENSION INTRA-ARTICULAR; INTRAMUSCULAR at 02:08

## 2024-08-10 ENCOUNTER — CLINICAL SUPPORT (OUTPATIENT)
Dept: CARDIOLOGY | Facility: HOSPITAL | Age: 69
End: 2024-08-10
Attending: INTERNAL MEDICINE
Payer: MEDICARE

## 2024-08-10 ENCOUNTER — CLINICAL SUPPORT (OUTPATIENT)
Dept: CARDIOLOGY | Facility: HOSPITAL | Age: 69
End: 2024-08-10
Payer: MEDICARE

## 2024-08-10 DIAGNOSIS — Z95.810 PRESENCE OF AUTOMATIC (IMPLANTABLE) CARDIAC DEFIBRILLATOR: ICD-10-CM

## 2024-08-10 PROCEDURE — 93296 REM INTERROG EVL PM/IDS: CPT | Mod: HCNC | Performed by: INTERNAL MEDICINE

## 2024-08-10 PROCEDURE — 93295 DEV INTERROG REMOTE 1/2/MLT: CPT | Mod: HCNC,,, | Performed by: INTERNAL MEDICINE

## 2024-08-12 ENCOUNTER — TELEPHONE (OUTPATIENT)
Dept: ELECTROPHYSIOLOGY | Facility: CLINIC | Age: 69
End: 2024-08-12
Payer: MEDICARE

## 2024-08-12 ENCOUNTER — TELEPHONE (OUTPATIENT)
Dept: CARDIOLOGY | Facility: HOSPITAL | Age: 69
End: 2024-08-12
Payer: MEDICARE

## 2024-08-12 NOTE — TELEPHONE ENCOUNTER
The patients' CIED has reached ELECTIVE REPLACEMENT.     Current Device  and Model:  MDT Viva S CRTD (HIS Lead in LV port)    Date of LATOYA, if known: 8/10/24    Is this replacement indicator early or unexpected due to an advisory:  No    Battery Voltage (if available): N/A    Pacemaker Dependent:  No    Anticoagulation Status:  None    Last EF: 60% 10/14/20    Model of Leads: RA 4076, RV 6935, LV port is HIS lead 3830    Any known lead recalls:  No    Leads MRI compatible:  No due to capped epicardial leads.    Any history of treated ventricular arrhythmias (ATP or shocks)?  No    Any history of device treated atrial arrhythmias (atrial ATP)?  No      *RN coordinator notified for scheduling; device coordinator notified to contact patient

## 2024-08-12 NOTE — TELEPHONE ENCOUNTER
Pt returned the call.  Informed pt her ICD battery has reached RRT, that Dr. Gates and his nurse have been notified and that she should receive a call from his nurse some time later this week or early next week.  Understanding was verbalized.  Pt appreciated the call.

## 2024-08-12 NOTE — TELEPHONE ENCOUNTER
Message left on pt's VM# of [phone] asking that she please call the Device Clinic.  Call back # for the Device Clinic was left on VM.  Re:  JEAN MARIE ICD battery @ RRT.

## 2024-08-13 DIAGNOSIS — I50.32 CHRONIC DIASTOLIC HEART FAILURE: ICD-10-CM

## 2024-08-13 DIAGNOSIS — I42.0 DILATED CARDIOMYOPATHY: Primary | ICD-10-CM

## 2024-08-18 ENCOUNTER — HOSPITAL ENCOUNTER (INPATIENT)
Facility: HOSPITAL | Age: 69
LOS: 3 days | Discharge: HOME HEALTH | DRG: 478 | End: 2024-08-23
Attending: STUDENT IN AN ORGANIZED HEALTH CARE EDUCATION/TRAINING PROGRAM | Admitting: HOSPITALIST
Payer: MEDICARE

## 2024-08-18 DIAGNOSIS — M25.552 LEFT HIP PAIN: ICD-10-CM

## 2024-08-18 DIAGNOSIS — M84.452A: ICD-10-CM

## 2024-08-18 DIAGNOSIS — R07.9 CHEST PAIN: ICD-10-CM

## 2024-08-18 DIAGNOSIS — M89.9 LYTIC BONE LESION OF LEFT FEMUR: Primary | ICD-10-CM

## 2024-08-18 DIAGNOSIS — M89.50 OSTEOLYTIC LESION: ICD-10-CM

## 2024-08-18 PROCEDURE — 99285 EMERGENCY DEPT VISIT HI MDM: CPT | Mod: 25,HCNC

## 2024-08-18 PROCEDURE — 96372 THER/PROPH/DIAG INJ SC/IM: CPT | Performed by: STUDENT IN AN ORGANIZED HEALTH CARE EDUCATION/TRAINING PROGRAM

## 2024-08-18 PROCEDURE — 96375 TX/PRO/DX INJ NEW DRUG ADDON: CPT | Mod: HCNC

## 2024-08-18 PROCEDURE — 96365 THER/PROPH/DIAG IV INF INIT: CPT | Mod: HCNC

## 2024-08-18 PROCEDURE — 82962 GLUCOSE BLOOD TEST: CPT | Mod: HCNC

## 2024-08-18 PROCEDURE — 63600175 PHARM REV CODE 636 W HCPCS: Mod: HCNC | Performed by: STUDENT IN AN ORGANIZED HEALTH CARE EDUCATION/TRAINING PROGRAM

## 2024-08-18 RX ORDER — ORPHENADRINE CITRATE 30 MG/ML
60 INJECTION INTRAMUSCULAR; INTRAVENOUS
Status: COMPLETED | OUTPATIENT
Start: 2024-08-18 | End: 2024-08-18

## 2024-08-18 RX ADMIN — ORPHENADRINE CITRATE 60 MG: 60 INJECTION INTRAMUSCULAR; INTRAVENOUS at 10:08

## 2024-08-19 ENCOUNTER — ANESTHESIA EVENT (OUTPATIENT)
Dept: SURGERY | Facility: HOSPITAL | Age: 69
End: 2024-08-19
Payer: MEDICARE

## 2024-08-19 ENCOUNTER — HOSPITAL ENCOUNTER (OUTPATIENT)
Dept: RADIOLOGY | Facility: HOSPITAL | Age: 69
Discharge: HOME | End: 2024-08-19
Attending: STUDENT IN AN ORGANIZED HEALTH CARE EDUCATION/TRAINING PROGRAM | Admitting: INTERNAL MEDICINE
Payer: MEDICARE

## 2024-08-19 ENCOUNTER — PATIENT MESSAGE (OUTPATIENT)
Dept: HEMATOLOGY/ONCOLOGY | Facility: CLINIC | Age: 69
End: 2024-08-19
Payer: MEDICARE

## 2024-08-19 PROBLEM — M25.552 LEFT HIP PAIN: Status: ACTIVE | Noted: 2024-08-19

## 2024-08-19 LAB
25(OH)D3+25(OH)D2 SERPL-MCNC: 45 NG/ML (ref 30–96)
ABO + RH BLD: NORMAL
ALBUMIN SERPL BCP-MCNC: 3.1 G/DL (ref 3.5–5.2)
ALP SERPL-CCNC: 63 U/L (ref 55–135)
ALT SERPL W/O P-5'-P-CCNC: 10 U/L (ref 10–44)
ANION GAP SERPL CALC-SCNC: 12 MMOL/L (ref 8–16)
APTT PPP: 29.8 SEC (ref 21–32)
AST SERPL-CCNC: 17 U/L (ref 10–40)
BACTERIA #/AREA URNS AUTO: ABNORMAL /HPF
BASOPHILS # BLD AUTO: 0.04 K/UL (ref 0–0.2)
BASOPHILS NFR BLD: 0.7 % (ref 0–1.9)
BILIRUB SERPL-MCNC: 0.2 MG/DL (ref 0.1–1)
BILIRUB UR QL STRIP: NEGATIVE
BLD GP AB SCN CELLS X3 SERPL QL: NORMAL
BUN SERPL-MCNC: 20 MG/DL (ref 8–23)
CALCIUM SERPL-MCNC: 10.7 MG/DL (ref 8.7–10.5)
CHLORIDE SERPL-SCNC: 105 MMOL/L (ref 95–110)
CLARITY UR REFRACT.AUTO: CLEAR
CO2 SERPL-SCNC: 26 MMOL/L (ref 23–29)
COLOR UR AUTO: YELLOW
CREAT SERPL-MCNC: 1 MG/DL (ref 0.5–1.4)
DIFFERENTIAL METHOD BLD: ABNORMAL
EOSINOPHIL # BLD AUTO: 0.1 K/UL (ref 0–0.5)
EOSINOPHIL NFR BLD: 1 % (ref 0–8)
ERYTHROCYTE [DISTWIDTH] IN BLOOD BY AUTOMATED COUNT: 17.7 % (ref 11.5–14.5)
EST. GFR  (NO RACE VARIABLE): >60 ML/MIN/1.73 M^2
ESTIMATED AVG GLUCOSE: 128 MG/DL (ref 68–131)
GLUCOSE SERPL-MCNC: 132 MG/DL (ref 70–110)
GLUCOSE UR QL STRIP: NEGATIVE
GRAN CASTS UR QL COMP ASSIST: 1 /LPF
HBA1C MFR BLD: 6.1 % (ref 4–5.6)
HCT VFR BLD AUTO: 32.4 % (ref 37–48.5)
HGB BLD-MCNC: 9.5 G/DL (ref 12–16)
HGB UR QL STRIP: NEGATIVE
HYALINE CASTS UR QL AUTO: 6 /LPF
IMM GRANULOCYTES # BLD AUTO: 0.06 K/UL (ref 0–0.04)
IMM GRANULOCYTES NFR BLD AUTO: 1 % (ref 0–0.5)
INR PPP: 1 (ref 0.8–1.2)
INR PPP: 1 (ref 0.8–1.2)
KETONES UR QL STRIP: NEGATIVE
LEUKOCYTE ESTERASE UR QL STRIP: NEGATIVE
LYMPHOCYTES # BLD AUTO: 0.9 K/UL (ref 1–4.8)
LYMPHOCYTES NFR BLD: 14.7 % (ref 18–48)
MAGNESIUM SERPL-MCNC: 1.4 MG/DL (ref 1.6–2.6)
MCH RBC QN AUTO: 23 PG (ref 27–31)
MCHC RBC AUTO-ENTMCNC: 29.3 G/DL (ref 32–36)
MCV RBC AUTO: 79 FL (ref 82–98)
MICROSCOPIC COMMENT: ABNORMAL
MONOCYTES # BLD AUTO: 0.6 K/UL (ref 0.3–1)
MONOCYTES NFR BLD: 9.5 % (ref 4–15)
NEUTROPHILS # BLD AUTO: 4.4 K/UL (ref 1.8–7.7)
NEUTROPHILS NFR BLD: 73.1 % (ref 38–73)
NITRITE UR QL STRIP: NEGATIVE
NRBC BLD-RTO: 0 /100 WBC
PH UR STRIP: 6 [PH] (ref 5–8)
PHOSPHATE SERPL-MCNC: 3.1 MG/DL (ref 2.7–4.5)
PLATELET # BLD AUTO: 269 K/UL (ref 150–450)
PMV BLD AUTO: 10.2 FL (ref 9.2–12.9)
POCT GLUCOSE: 138 MG/DL (ref 70–110)
POCT GLUCOSE: 141 MG/DL (ref 70–110)
POCT GLUCOSE: 147 MG/DL (ref 70–110)
POTASSIUM SERPL-SCNC: 4.2 MMOL/L (ref 3.5–5.1)
PREALB SERPL-MCNC: 20 MG/DL (ref 20–43)
PROT SERPL-MCNC: 7.3 G/DL (ref 6–8.4)
PROT UR QL STRIP: ABNORMAL
PROTHROMBIN TIME: 10.9 SEC (ref 9–12.5)
PROTHROMBIN TIME: 11 SEC (ref 9–12.5)
RBC # BLD AUTO: 4.13 M/UL (ref 4–5.4)
RBC #/AREA URNS AUTO: 0 /HPF (ref 0–4)
SODIUM SERPL-SCNC: 143 MMOL/L (ref 136–145)
SP GR UR STRIP: 1.02 (ref 1–1.03)
SPECIMEN OUTDATE: NORMAL
SQUAMOUS #/AREA URNS AUTO: 0 /HPF
TRANSFERRIN SERPL-MCNC: 228 MG/DL (ref 200–375)
URN SPEC COLLECT METH UR: ABNORMAL
WBC # BLD AUTO: 5.98 K/UL (ref 3.9–12.7)
WBC #/AREA URNS AUTO: 2 /HPF (ref 0–5)

## 2024-08-19 PROCEDURE — 63600175 PHARM REV CODE 636 W HCPCS: Mod: HCNC | Performed by: NURSE PRACTITIONER

## 2024-08-19 PROCEDURE — 25000003 PHARM REV CODE 250: Mod: HCNC

## 2024-08-19 PROCEDURE — 85610 PROTHROMBIN TIME: CPT | Mod: 91,HCNC

## 2024-08-19 PROCEDURE — 86900 BLOOD TYPING SEROLOGIC ABO: CPT | Mod: HCNC

## 2024-08-19 PROCEDURE — 84466 ASSAY OF TRANSFERRIN: CPT | Mod: HCNC

## 2024-08-19 PROCEDURE — 86850 RBC ANTIBODY SCREEN: CPT | Mod: HCNC

## 2024-08-19 PROCEDURE — 85025 COMPLETE CBC W/AUTO DIFF WBC: CPT | Mod: HCNC | Performed by: STUDENT IN AN ORGANIZED HEALTH CARE EDUCATION/TRAINING PROGRAM

## 2024-08-19 PROCEDURE — 81001 URINALYSIS AUTO W/SCOPE: CPT | Mod: HCNC

## 2024-08-19 PROCEDURE — 63600175 PHARM REV CODE 636 W HCPCS: Mod: HCNC

## 2024-08-19 PROCEDURE — 99223 1ST HOSP IP/OBS HIGH 75: CPT | Mod: HCNC,,, | Performed by: ORTHOPAEDIC SURGERY

## 2024-08-19 PROCEDURE — 83735 ASSAY OF MAGNESIUM: CPT | Mod: HCNC

## 2024-08-19 PROCEDURE — 96375 TX/PRO/DX INJ NEW DRUG ADDON: CPT

## 2024-08-19 PROCEDURE — 83036 HEMOGLOBIN GLYCOSYLATED A1C: CPT | Mod: HCNC

## 2024-08-19 PROCEDURE — 36415 COLL VENOUS BLD VENIPUNCTURE: CPT | Mod: HCNC

## 2024-08-19 PROCEDURE — G0378 HOSPITAL OBSERVATION PER HR: HCPCS | Mod: HCNC

## 2024-08-19 PROCEDURE — 85730 THROMBOPLASTIN TIME PARTIAL: CPT | Mod: HCNC

## 2024-08-19 PROCEDURE — 86901 BLOOD TYPING SEROLOGIC RH(D): CPT | Mod: HCNC

## 2024-08-19 PROCEDURE — 63600175 PHARM REV CODE 636 W HCPCS: Mod: HCNC | Performed by: STUDENT IN AN ORGANIZED HEALTH CARE EDUCATION/TRAINING PROGRAM

## 2024-08-19 PROCEDURE — 85610 PROTHROMBIN TIME: CPT | Mod: HCNC

## 2024-08-19 PROCEDURE — 86920 COMPATIBILITY TEST SPIN: CPT | Mod: HCNC

## 2024-08-19 PROCEDURE — 84134 ASSAY OF PREALBUMIN: CPT | Mod: HCNC

## 2024-08-19 PROCEDURE — 80053 COMPREHEN METABOLIC PANEL: CPT | Mod: HCNC | Performed by: STUDENT IN AN ORGANIZED HEALTH CARE EDUCATION/TRAINING PROGRAM

## 2024-08-19 PROCEDURE — 71045 X-RAY EXAM CHEST 1 VIEW: CPT | Mod: 26,HCNC,, | Performed by: RADIOLOGY

## 2024-08-19 PROCEDURE — 96372 THER/PROPH/DIAG INJ SC/IM: CPT

## 2024-08-19 PROCEDURE — 96376 TX/PRO/DX INJ SAME DRUG ADON: CPT

## 2024-08-19 PROCEDURE — 71045 X-RAY EXAM CHEST 1 VIEW: CPT | Mod: TC,HCNC

## 2024-08-19 PROCEDURE — 84100 ASSAY OF PHOSPHORUS: CPT | Mod: HCNC

## 2024-08-19 PROCEDURE — 82306 VITAMIN D 25 HYDROXY: CPT | Mod: HCNC

## 2024-08-19 RX ORDER — MORPHINE SULFATE 4 MG/ML
4 INJECTION, SOLUTION INTRAMUSCULAR; INTRAVENOUS
Status: COMPLETED | OUTPATIENT
Start: 2024-08-19 | End: 2024-08-19

## 2024-08-19 RX ORDER — NALOXONE HCL 0.4 MG/ML
0.02 VIAL (ML) INJECTION
Status: DISCONTINUED | OUTPATIENT
Start: 2024-08-19 | End: 2024-08-23 | Stop reason: HOSPADM

## 2024-08-19 RX ORDER — BUPROPION HYDROCHLORIDE 150 MG/1
150 TABLET ORAL DAILY
Status: DISCONTINUED | OUTPATIENT
Start: 2024-08-19 | End: 2024-08-23 | Stop reason: HOSPADM

## 2024-08-19 RX ORDER — SERTRALINE HYDROCHLORIDE 100 MG/1
100 TABLET, FILM COATED ORAL NIGHTLY
Status: DISCONTINUED | OUTPATIENT
Start: 2024-08-19 | End: 2024-08-23 | Stop reason: HOSPADM

## 2024-08-19 RX ORDER — ACETAMINOPHEN 500 MG
1000 TABLET ORAL EVERY 8 HOURS
Status: DISCONTINUED | OUTPATIENT
Start: 2024-08-20 | End: 2024-08-20 | Stop reason: HOSPADM

## 2024-08-19 RX ORDER — GABAPENTIN 300 MG/1
300 CAPSULE ORAL 3 TIMES DAILY
Status: DISCONTINUED | OUTPATIENT
Start: 2024-08-19 | End: 2024-08-23 | Stop reason: HOSPADM

## 2024-08-19 RX ORDER — IBUPROFEN 200 MG
16 TABLET ORAL
Status: DISCONTINUED | OUTPATIENT
Start: 2024-08-19 | End: 2024-08-23 | Stop reason: HOSPADM

## 2024-08-19 RX ORDER — BISACODYL 10 MG/1
10 SUPPOSITORY RECTAL DAILY PRN
Status: DISCONTINUED | OUTPATIENT
Start: 2024-08-20 | End: 2024-08-23 | Stop reason: HOSPADM

## 2024-08-19 RX ORDER — SODIUM CHLORIDE 9 MG/ML
INJECTION, SOLUTION INTRAVENOUS CONTINUOUS
Status: ACTIVE | OUTPATIENT
Start: 2024-08-20 | End: 2024-08-20

## 2024-08-19 RX ORDER — INSULIN ASPART 100 [IU]/ML
0-10 INJECTION, SOLUTION INTRAVENOUS; SUBCUTANEOUS EVERY 6 HOURS PRN
Status: DISCONTINUED | OUTPATIENT
Start: 2024-08-19 | End: 2024-08-23 | Stop reason: HOSPADM

## 2024-08-19 RX ORDER — TALC
6 POWDER (GRAM) TOPICAL NIGHTLY PRN
Status: DISCONTINUED | OUTPATIENT
Start: 2024-08-19 | End: 2024-08-23 | Stop reason: HOSPADM

## 2024-08-19 RX ORDER — GLUCAGON 1 MG
1 KIT INJECTION
Status: DISCONTINUED | OUTPATIENT
Start: 2024-08-19 | End: 2024-08-23 | Stop reason: HOSPADM

## 2024-08-19 RX ORDER — ACETAMINOPHEN 325 MG/1
650 TABLET ORAL EVERY 4 HOURS PRN
Status: DISCONTINUED | OUTPATIENT
Start: 2024-08-19 | End: 2024-08-19

## 2024-08-19 RX ORDER — IBUPROFEN 200 MG
24 TABLET ORAL
Status: DISCONTINUED | OUTPATIENT
Start: 2024-08-19 | End: 2024-08-23 | Stop reason: HOSPADM

## 2024-08-19 RX ORDER — ALUMINUM HYDROXIDE, MAGNESIUM HYDROXIDE, AND SIMETHICONE 1200; 120; 1200 MG/30ML; MG/30ML; MG/30ML
30 SUSPENSION ORAL 4 TIMES DAILY PRN
Status: DISCONTINUED | OUTPATIENT
Start: 2024-08-19 | End: 2024-08-23 | Stop reason: HOSPADM

## 2024-08-19 RX ORDER — SODIUM CHLORIDE 0.9 % (FLUSH) 0.9 %
10 SYRINGE (ML) INJECTION
Status: DISCONTINUED | OUTPATIENT
Start: 2024-08-20 | End: 2024-08-23 | Stop reason: HOSPADM

## 2024-08-19 RX ORDER — PANTOPRAZOLE SODIUM 40 MG/1
40 TABLET, DELAYED RELEASE ORAL DAILY
Status: DISCONTINUED | OUTPATIENT
Start: 2024-08-19 | End: 2024-08-23 | Stop reason: HOSPADM

## 2024-08-19 RX ORDER — AMOXICILLIN 250 MG
1 CAPSULE ORAL DAILY PRN
Status: DISCONTINUED | OUTPATIENT
Start: 2024-08-19 | End: 2024-08-23 | Stop reason: HOSPADM

## 2024-08-19 RX ORDER — METOPROLOL SUCCINATE 100 MG/1
100 TABLET, EXTENDED RELEASE ORAL DAILY
Status: DISCONTINUED | OUTPATIENT
Start: 2024-08-19 | End: 2024-08-23 | Stop reason: HOSPADM

## 2024-08-19 RX ORDER — ONDANSETRON HYDROCHLORIDE 2 MG/ML
4 INJECTION, SOLUTION INTRAVENOUS EVERY 8 HOURS PRN
Status: DISCONTINUED | OUTPATIENT
Start: 2024-08-19 | End: 2024-08-23 | Stop reason: HOSPADM

## 2024-08-19 RX ORDER — ONDANSETRON HYDROCHLORIDE 2 MG/ML
4 INJECTION, SOLUTION INTRAVENOUS
Status: COMPLETED | OUTPATIENT
Start: 2024-08-19 | End: 2024-08-19

## 2024-08-19 RX ORDER — CHOLECALCIFEROL (VITAMIN D3) 25 MCG
2000 TABLET ORAL DAILY
Status: DISCONTINUED | OUTPATIENT
Start: 2024-08-20 | End: 2024-08-23 | Stop reason: HOSPADM

## 2024-08-19 RX ORDER — MORPHINE SULFATE 2 MG/ML
2 INJECTION, SOLUTION INTRAMUSCULAR; INTRAVENOUS
Status: DISCONTINUED | OUTPATIENT
Start: 2024-08-20 | End: 2024-08-20

## 2024-08-19 RX ORDER — ATORVASTATIN CALCIUM 10 MG/1
10 TABLET, FILM COATED ORAL DAILY
Status: DISCONTINUED | OUTPATIENT
Start: 2024-08-19 | End: 2024-08-23 | Stop reason: HOSPADM

## 2024-08-19 RX ORDER — SODIUM CHLORIDE 0.9 % (FLUSH) 0.9 %
10 SYRINGE (ML) INJECTION EVERY 12 HOURS PRN
Status: DISCONTINUED | OUTPATIENT
Start: 2024-08-19 | End: 2024-08-19

## 2024-08-19 RX ORDER — OXYCODONE HYDROCHLORIDE 5 MG/1
5 TABLET ORAL
Status: DISCONTINUED | OUTPATIENT
Start: 2024-08-20 | End: 2024-08-20 | Stop reason: HOSPADM

## 2024-08-19 RX ORDER — HYDROMORPHONE HYDROCHLORIDE 1 MG/ML
0.5 INJECTION, SOLUTION INTRAMUSCULAR; INTRAVENOUS; SUBCUTANEOUS EVERY 4 HOURS PRN
Status: DISCONTINUED | OUTPATIENT
Start: 2024-08-19 | End: 2024-08-19

## 2024-08-19 RX ORDER — MAGNESIUM SULFATE HEPTAHYDRATE 40 MG/ML
2 INJECTION, SOLUTION INTRAVENOUS ONCE
Status: COMPLETED | OUTPATIENT
Start: 2024-08-19 | End: 2024-08-19

## 2024-08-19 RX ORDER — LOSARTAN POTASSIUM 25 MG/1
100 TABLET ORAL DAILY
Status: DISCONTINUED | OUTPATIENT
Start: 2024-08-19 | End: 2024-08-23 | Stop reason: HOSPADM

## 2024-08-19 RX ORDER — AMLODIPINE BESYLATE 5 MG/1
5 TABLET ORAL DAILY
Status: DISCONTINUED | OUTPATIENT
Start: 2024-08-19 | End: 2024-08-23 | Stop reason: HOSPADM

## 2024-08-19 RX ORDER — OXYCODONE HYDROCHLORIDE 5 MG/1
10 TABLET ORAL
Status: DISCONTINUED | OUTPATIENT
Start: 2024-08-20 | End: 2024-08-20 | Stop reason: HOSPADM

## 2024-08-19 RX ORDER — ENOXAPARIN SODIUM 100 MG/ML
40 INJECTION SUBCUTANEOUS EVERY 24 HOURS
Status: DISCONTINUED | OUTPATIENT
Start: 2024-08-19 | End: 2024-08-19

## 2024-08-19 RX ORDER — METHOCARBAMOL 500 MG/1
500 TABLET, FILM COATED ORAL EVERY 6 HOURS PRN
Status: DISCONTINUED | OUTPATIENT
Start: 2024-08-20 | End: 2024-08-20

## 2024-08-19 RX ORDER — CETIRIZINE HYDROCHLORIDE 10 MG/1
10 TABLET ORAL NIGHTLY
Status: DISCONTINUED | OUTPATIENT
Start: 2024-08-19 | End: 2024-08-23 | Stop reason: HOSPADM

## 2024-08-19 RX ORDER — BENZONATATE 100 MG/1
200 CAPSULE ORAL 3 TIMES DAILY PRN
Status: DISCONTINUED | OUTPATIENT
Start: 2024-08-19 | End: 2024-08-23 | Stop reason: HOSPADM

## 2024-08-19 RX ADMIN — GABAPENTIN 300 MG: 300 CAPSULE ORAL at 09:08

## 2024-08-19 RX ADMIN — THERA TABS 1 TABLET: TAB at 09:08

## 2024-08-19 RX ADMIN — LOSARTAN POTASSIUM 100 MG: 25 TABLET, FILM COATED ORAL at 09:08

## 2024-08-19 RX ADMIN — MORPHINE SULFATE 4 MG: 4 INJECTION, SOLUTION INTRAMUSCULAR; INTRAVENOUS at 04:08

## 2024-08-19 RX ADMIN — ATORVASTATIN CALCIUM 10 MG: 10 TABLET, FILM COATED ORAL at 09:08

## 2024-08-19 RX ADMIN — ALUMINUM HYDROXIDE, MAGNESIUM HYDROXIDE, AND SIMETHICONE 30 ML: 1200; 120; 1200 SUSPENSION ORAL at 11:08

## 2024-08-19 RX ADMIN — AMLODIPINE BESYLATE 5 MG: 5 TABLET ORAL at 07:08

## 2024-08-19 RX ADMIN — HYDROMORPHONE HYDROCHLORIDE 0.5 MG: 1 INJECTION, SOLUTION INTRAMUSCULAR; INTRAVENOUS; SUBCUTANEOUS at 04:08

## 2024-08-19 RX ADMIN — BUPROPION HYDROCHLORIDE 150 MG: 150 TABLET, FILM COATED, EXTENDED RELEASE ORAL at 08:08

## 2024-08-19 RX ADMIN — METOPROLOL SUCCINATE 100 MG: 100 TABLET, EXTENDED RELEASE ORAL at 09:08

## 2024-08-19 RX ADMIN — Medication 6 MG: at 09:08

## 2024-08-19 RX ADMIN — HYDROMORPHONE HYDROCHLORIDE 0.5 MG: 1 INJECTION, SOLUTION INTRAMUSCULAR; INTRAVENOUS; SUBCUTANEOUS at 11:08

## 2024-08-19 RX ADMIN — PANTOPRAZOLE SODIUM 40 MG: 40 TABLET, DELAYED RELEASE ORAL at 09:08

## 2024-08-19 RX ADMIN — ENOXAPARIN SODIUM 40 MG: 40 INJECTION SUBCUTANEOUS at 04:08

## 2024-08-19 RX ADMIN — HYDROMORPHONE HYDROCHLORIDE 0.5 MG: 1 INJECTION, SOLUTION INTRAMUSCULAR; INTRAVENOUS; SUBCUTANEOUS at 09:08

## 2024-08-19 RX ADMIN — GABAPENTIN 300 MG: 300 CAPSULE ORAL at 04:08

## 2024-08-19 RX ADMIN — ONDANSETRON 4 MG: 2 INJECTION INTRAMUSCULAR; INTRAVENOUS at 04:08

## 2024-08-19 RX ADMIN — SERTRALINE 100 MG: 100 TABLET, FILM COATED ORAL at 09:08

## 2024-08-19 RX ADMIN — MAGNESIUM SULFATE HEPTAHYDRATE 2 G: 40 INJECTION, SOLUTION INTRAVENOUS at 07:08

## 2024-08-19 RX ADMIN — CETIRIZINE HYDROCHLORIDE 10 MG: 10 TABLET, FILM COATED ORAL at 09:08

## 2024-08-19 NOTE — ASSESSMENT & PLAN NOTE
Chronic, Latest blood pressure and vitals reviewed-     Temp:  [98.1 °F (36.7 °C)]   Pulse:  [77-92]   Resp:  [18-19]   BP: (138-178)/(72-99)   SpO2:  [93 %-99 %] .   Home meds for hypertension were reviewed and noted below.   Hypertension Medications               amLODIPine (NORVASC) 5 MG tablet TAKE 1 TABLET BY MOUTH EVERY DAY    losartan (COZAAR) 100 MG tablet TAKE 1 TABLET BY MOUTH EVERY DAY    metoprolol succinate (TOPROL-XL) 100 MG 24 hr tablet TAKE 1 TABLET BY MOUTH EVERY DAY            While in the hospital, will manage blood pressure as follows; Continue home antihypertensive regimen    Will utilize p.r.n. blood pressure medication only if patient's blood pressure greater than 180/110 and she develops symptoms such as worsening chest pain or shortness of breath.

## 2024-08-19 NOTE — ASSESSMENT & PLAN NOTE
Body mass index is 38.41 kg/m². Morbid obesity complicates all aspects of disease management from diagnostic modalities to treatment.

## 2024-08-19 NOTE — PLAN OF CARE
08/19/24 1050   Discharge Planning   Assessment Type Discharge Planning Brief Assessment   Resource/Environmental Concerns none   Support Systems Children   Equipment Currently Used at Home none   Current Living Arrangements home   Patient/Family Anticipates Transition to home   Patient/Family Anticipated Services at Transition none   DME Needed Upon Discharge  none   Discharge Plan A Home  (with instructions to follow up)       [id number] [place] - [address]  [address] [place] [address]  Phone: [phone] Fax: [phone]    [facility] - [place], [place] - [address]  [address] [address]  Phone: [phone] Fax: [phone]

## 2024-08-19 NOTE — NURSING
Pt transfer from unit to main campus by way of ambulance. Attempt to call report was made, charge nurse was busy and promised to call back . Administered pain med prior to transport. Pt in no acute distress.

## 2024-08-19 NOTE — PROVIDER TRANSFER
(Physician in Lead of Transfers)  Outside Transfer Acceptance Note / Regional Referral Center    Upon patient arrival to floor, please send SecureChat to [facility] or call extension [phone] (if no answer, do NOT leave a callback number after the beep, rather please send a SecureChat to [facility]), for Hospital Medicine admit team assignment and for additional admit orders for the patient.  Do not page the attending physician associated with the patient on arrival (this physician may not be on duty at the time of arrival).  Rather, always send a SecureChat to [facility] or call [phone] to reach the triage physician for orders and team assignment.    Referring facility: [facility]   Referring provider: PEE YARBROUGH  Accepting facility: [facility]  Accepting provider: TIMUR JACOB  Reason for transfer:  Orthopedic Oncology evaluation  Transfer diagnosis: osteolytic lesion left femur  Transfer specialty requested: Orthopedic Surgery  Transfer specialty notified: Yes  Transfer level: NUMBER 1-5: 2  Bed type requested: med-surg  Isolation status: No active isolations   Admission class or status: IP- Inpatient      Narrative     68-year-old female with a history of non-small-cell lung cancer (treated with chemotherapy and radiation therapy), chronic liver disease, diabetes, hypertension, hyperlipidemia, COPD, radiation pneumonitis, breast cancer, dilated cardiomyopathy, and CRT-D placement admitted to [facility] on August 18 with worsening pain to her left hip radiating to her left knee.  Pain has been bothering her for a while but has progressively worsened.  She was walking recently when she heard a loud pop and developed excruciating pain in her left leg.  She was found to have osteolytic lesion with beginning cortical destruction in the left hip intertrochanteric region.  She was seen by Orthopedic Surgery, and she was noted to have intact peripheral pulses  and intact sensation.  Recommendation was for transfer for Orthopedic Surgery/Orthopedic Oncology evaluation.  Transfer center contacted Orthopedic Surgery at [facility].  Will plan transfer to Hospital Medicine at [facility] for further evaluation of the femur lesion.  Referring provider noted patient is awake and alert.  She has pain in the hip area.  Oxygenation fluctuates, but she has no respiratory distress.    August 19:  INR 1, magnesium 1.4, phosphorus 3.1, sodium 143, potassium 4.2, chloride 105, CO2 26, BUN 20, creatinine 1, glucose 132, calcium 10.7, AST 17, ALT 10, white blood cells 5.98, hemoglobin 9.5, hematocrit 32.4, platelets 269    August 18: CT of the left hip note expansile osteolytic lesion with beginning cortical destruction is seen intertrochanteric region.  No hip dislocation.  No displaced fracture.  Degenerative change seen in the visualized lower lumbar spine.    July 31: CT head with and without contrast had no evidence of intracranial metastasis.  Stable appearing encephalomalacia at the right parietal and posterior temporal lobes.  -CT chest, abdomen, and pelvis noted patient with known history of non-small-cell lung cancer.  Continued degree of right lower lobe volume loss/collapse with pleural fluid.  Some increased soft tissue attenuation about the right medial lower lobe segmental arterial branch with a mild degree of narrowing.    VS:  Temperature 97.9°, pulse 78, respirations 18, blood pressure 128/60, O2 sats 91-99% on     Objective     Vitals: Temp: 97.9 °F (36.6 °C) (08/19/24 1153)  Pulse: 78 (08/19/24 1153)  Resp: 18 (08/19/24 1153)  BP: 128/60 (08/19/24 1153)  SpO2: (!) 91 % (08/19/24 1153)  Recent Labs: CBC:   Recent Labs   Lab 08/19/24  0436   WBC 5.98   HGB 9.5*   HCT 32.4*        CMP:   Recent Labs   Lab 08/19/24  0436      K 4.2      CO2 26   *   BUN 20   CREATININE 1.0   CALCIUM 10.7*   PROT 7.3   ALBUMIN 3.1*   BILITOT 0.2    ALKPHOS 63   AST 17   ALT 10   ANIONGAP 12         Instructions    Admit to Hospital Medicine  Consult Orthopedic Surgery      RACHEL Lackey MD  Hospital Medicine Staff  Cell: [phone]

## 2024-08-19 NOTE — H&P
"  [facility]  History & Physical    Patient Name: Hannah Montoya  MRN: [id number]  Patient Class: OP- Observation  Admission Date: 8/18/2024  Attending Physician: Ramin Monroy MD   Primary Care Provider: Emanuel Chavez MD         Patient information was obtained from patient, past medical records, and ER records.     Subjective:     Principal Problem:Left hip pain    Chief Complaint:   Chief Complaint   Patient presents with    Hip Pain    Knee Pain     Pt c/o L hip and knee pain that started tonight. Pt states she was using the rest room and heard a  " pop" then was unable to bear weight or ambulate with her L leg. L knee is swollen, reports this happened once before and was seen by an ortho dr. Pt denies trauma. Increased pain with movement.         HPI: Ms. Montoya is a 68 yr old female with a hx of HTN, dm type 2, left bundle branch block, CRT-D placement, breast cancer, NSCLC of RLL, obesity, depression, dilated cardiomyopathy, nonischemic cardiomyopathy, COPD, HLD who presents to the ED with a chief complaint of 10/10 constant sharp left hip pain that radiates to her left knee. Patient endorses that left hip has been bothering her over the last month.  She states that she had been using a cane to get around and the pain progressively worsened.  She was seen by her oncologist who recommended gabapentin 300 mg TID.  She also endorses that due to the left knee swelling and left hip pain she had to began using a walker to get around at home.  She states that she was seen by a bone Dr. who prescribed meloxicam and gave her a steroid shot in her knee.  About 1 week prior to today she was bringing in groceries and fell on her left side.  She endorses she was walking to the restroom with her cane yesterday and she heard a loud pop and began having excruciating pain and was unable to bear weight or ambulate with her left leg afterwards.  She endorses movement exacerbates " "her pain.  She states after treatment in the ED her pain is now 4-5/10.  She states that she quit smoking in 2016, rarely drinks alcohol, and denies recreational drug use.  She denies fever, chills, SOB, CP, nausea, vomiting, diarrhea, dysuria, hematuria, lightheadedness, dizziness, and syncope.  Of note, she sees Dr. Avina for Oncology and Dr. Lara for radiation.    Upon arrival to ED, patient afebrile, HR of 92, RR of 19, BP of 146/82, satting 99% on RA.  Workup in the ED included CBC, CMP, left hip x-ray, CT left hip.  Workup revealed H/H of 9.5/32.4, calcium of 2.7.  Left hip x-ray showed AP pelvis and two views of the left hip demonstrate an oblique lucency in the left intertrochanteric region.  Finding may represent artifact versus nondisplaced fracture.  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  There is no hip dislocation.  There is no displaced fracture.  Degenerative changes are seen in the visualized lower lumbar spine."Patient was given morphine 4 mg IV, ondansetron 4 mg IV, and orphenadrine 60 mg IM while in the ED.  Case discussed with ED provider and patient will be placed under observation for further management.    Past Medical History:   Diagnosis Date    AICD (automatic cardioverter/defibrillator) present     Asthma     bronchitis in past    Breast cancer 2016    right    Cardiac pacemaker     Cardiomyopathy     COPD (chronic obstructive pulmonary disease)     Diabetes mellitus, type 2     Hyperglycemia     Hyperlipidemia     Hypertension     Malignant neoplasm of overlapping sites of female breast 2/12/2016    Nuclear sclerosis of both eyes 8/12/2020    Respiratory distress 3/12/2020       Past Surgical History:   Procedure Laterality Date    BIOPSY, WITH CT GUIDANCE Right 1/24/2023    Procedure: LUNG MASS BIOPSY, WITH CT GUIDANCE;  Surgeon: Yehuda Green, " MD;  Location: [facility] CATH LAB;  Service: Radiology;  Laterality: Right;    BREAST BIOPSY Right 2016    IDC    BREAST LUMPECTOMY Right     CARDIAC CATHETERIZATION Bilateral 2017    CARDIAC DEFIBRILLATOR PLACEMENT Left 08/10/2017    CARDIAC DEFIBRILLATOR PLACEMENT Left 2018     SECTION      x2    CHOLECYSTECTOMY      INSERTION OF TUNNELED CENTRAL VENOUS CATHETER (CVC) WITH SUBCUTANEOUS PORT Right 2020    Procedure: RLQILLQMS-[id number]-C-CATH, RIGHT;  Surgeon: Josefa Caceres MD;  Location: [address];  Service: General;  Laterality: Right;  Port-a-cath placed to R. IJ    LUNG BIOPSY N/A 2020    Procedure: BIOPSY, LUNG;  Surgeon: Dosc Diagnostic Provider;  Location: [facility] OR;  Service: Radiology;  Laterality: N/A;  8AM START  RN PREOP 2020---COVID NEGATIVE    NAVIGATIONAL BRONCHOSCOPY N/A 10/13/2020    Procedure: BRONCHOSCOPY, NAVIGATIONAL;  Surgeon: Jamilah Weaver MD;  Location: [address];  Service: Pulmonary;  Laterality: N/A;    REVISION OF IMPLANTABLE CARDIOVERTER-DEFIBRILLATOR (ICD) ELECTRODE LEAD PLACEMENT N/A 6/15/2018    Procedure: REVISION-LEAD-ICD;  Surgeon: Javy Gates MD;  Location: [facility] CATH LAB;  Service: Cardiology;  Laterality: N/A;  LBBB, Bi-V ICD HIS Elmo jordan MDT, Choice, MB, 3 Prep    ROBOT-ASSISTED LAPAROSCOPIC LYMPHADENECTOMY USING DA SALVADOR XI Right 10/23/2020    Procedure: XI ROBOTIC LYMPHADENECTOMY;  Surgeon: Ramo Tucker MD;  [address];  Service: Thoracic;  Laterality: Right;    TUBAL LIGATION         Review of patient's allergies indicates:   Allergen Reactions    Taxol [paclitaxel]      Hypersensitivity reaction to taxol, symptoms included shortness of breath, nausea, dizziness, flushing     Carboplatin Other (See Comments)     Itching and hives    Adhesive Rash     tegaderm burns and blistered skin       Current Facility-Administered Medications on File Prior to Encounter   Medication    fentaNYL injection 25 mcg     haloperidol lactate injection 0.5 mg    HYDROmorphone injection 0.2 mg    ondansetron injection 4 mg    sodium chloride 0.9% flush 10 mL     Current Outpatient Medications on File Prior to Encounter   Medication Sig    ACCU-CHEK ALFRED PLUS TEST STRP Strp USE TO TEST THREE TIMES DAILY    albuterol (ACCUNEB) 1.25 mg/3 mL Nebu use 1 AMPULE (3ml) via NEBULIZER EVERY 6 HOURS AS NEEDED    albuterol (VENTOLIN HFA) 90 mcg/actuation inhaler Inhale 2 puffs into the lungs every 4 (four) hours as needed for Wheezing or Shortness of Breath. Rescue    amLODIPine (NORVASC) 5 MG tablet TAKE 1 TABLET BY MOUTH EVERY DAY    atorvastatin (LIPITOR) 10 MG tablet TAKE 1 TABLET BY MOUTH EVERY DAY    benzonatate (TESSALON) 200 MG capsule Take 1 capsule (200 mg total) by mouth 3 (three) times daily as needed for Cough.    betamethasone dipropionate 0.05 % cream Apply topically 2 (two) times daily as needed (rash). (Patient not taking: Reported on 5/2/2024)    buPROPion (WELLBUTRIN XL) 150 MG TB24 tablet TAKE 1 TABLET BY MOUTH EVERY DAY    cetirizine (ZYRTEC) 10 MG tablet Take 10 mg by mouth every evening.     FLUAD QUAD 2023-24,65Y UP,,PF, 60 mcg (15 mcg x 4)/0.5 mL Syrg     gabapentin (NEURONTIN) 300 MG capsule Take 1 capsule (300 mg total) by mouth 3 (three) times daily.    losartan (COZAAR) 100 MG tablet TAKE 1 TABLET BY MOUTH EVERY DAY    meloxicam (MOBIC) 15 MG tablet Take 1 tablet (15 mg total) by mouth once daily.    metFORMIN (GLUCOPHAGE) 500 MG tablet TAKE 2 TABLETS BY MOUTH TWICE A DAY WITH MEALS    methylPREDNISolone (MEDROL DOSEPACK) 4 mg tablet use as directed (Patient not taking: Reported on 5/2/2024)    metoprolol succinate (TOPROL-XL) 100 MG 24 hr tablet TAKE 1 TABLET BY MOUTH EVERY DAY    multivitamin (THERAGRAN) per tablet Take 1 tablet by mouth once daily.    mupirocin (BACTROBAN) 2 % ointment Apply topically 3 (three) times daily. (Patient not taking: Reported on 5/2/2024)    OMNIPAQUE 300 300 mg iodine/mL injection   (Patient not taking: Reported on 5/2/2024)    OMNIPAQUE 350 350 mg iodine/mL Soln injection  (Patient not taking: Reported on 5/2/2024)    ondansetron (ZOFRAN-ODT) 8 MG TbDL Take 1 tablet (8 mg total) by mouth every 8 (eight) hours as needed (nausea/vomiting). Take 1 tablet (8 mg) by mouth every 8 hours as needed for nausea/vomiting.    OPDIVO 120 mg/12 mL Soln  (Patient not taking: Reported on 5/2/2024)    OPDIVO 240 mg/24 mL Soln  (Patient not taking: Reported on 5/2/2024)    OPDIVO 40 mg/4 mL injection  (Patient not taking: Reported on 5/2/2024)    palonosetron (ALOXI) 0.25 mg/5 mL injection     pantoprazole (PROTONIX) 40 MG tablet TAKE 1 TABLET BY MOUTH EVERY DAY    READI-CAT 2 2 % (w/v) suspension     READI-CAT 2 2.1 % (w/v), 2.0 % (w/w) suspension     semaglutide (OZEMPIC) 0.25 mg or 0.5 mg(2 mg/1.5 mL) pen injector Inject 0.5 mg into the skin every 7 days. (Patient not taking: Reported on 5/2/2024)    sertraline (ZOLOFT) 100 MG tablet TAKE 1 TABLET BY MOUTH EVERY DAY IN THE EVENING    tiotropium (SPIRIVA WITH HANDIHALER) 18 mcg inhalation capsule INHALE THE CONTENTS OF 1 CAPSULE BY MOUTH EVERY DAY    triamcinolone acetonide 0.1% (KENALOG) 0.1 % cream APPLY TOPICALLY TWICE A DAY (Patient not taking: Reported on 5/2/2024)    WIXELA INHUB 250-50 mcg/dose diskus inhaler INHALE 1 PUFF INTO THE LUNGS 2 (TWO) TIMES DAILY. CONTROLLER    YERVOY 50 mg/10 mL (5 mg/mL)  (Patient not taking: Reported on 5/2/2024)     Family History       Problem Relation (Age of Onset)    Cataracts Mother, Father    Clotting disorder Brother    Kidney disease Mother, Father    Macular degeneration Maternal Grandmother    No Known Problems Daughter, Son, Sister, Maternal Aunt, Maternal Uncle, Paternal Aunt, Paternal Uncle, Maternal Grandfather, Paternal Grandmother, Paternal Grandfather          Tobacco Use    Smoking status: Former     Current packs/day: 0.00     Average packs/day: 0.5 packs/day for 40.0 years (20.0 ttl pk-yrs)     Types:  Cigarettes     Start date: 1976     Quit date: 2016     Years since quittin.0     Passive exposure: Past    Smokeless tobacco: Never   Substance and Sexual Activity    Alcohol use: Yes     Alcohol/week: 1.0 standard drink of alcohol     Types: 1 Glasses of wine per week     Comment: rare- holiday    Drug use: No    Sexual activity: Not Currently     Partners: Male     Comment:      Review of Systems   Constitutional:  Negative for chills and fever.   Respiratory:  Negative for shortness of breath.    Cardiovascular:  Negative for chest pain.   Gastrointestinal:  Negative for abdominal pain, diarrhea, nausea and vomiting.   Genitourinary:  Negative for dysuria and hematuria.   Musculoskeletal:  Positive for arthralgias (left hip and left knee) and joint swelling (left knee).   Neurological:  Negative for dizziness, syncope and light-headedness.     Objective:     Vital Signs (Most Recent):  Temp: 98.1 °F (36.7 °C) (24 2132)  Pulse: 77 (24 0401)  Resp: 18 (24 0436)  BP: (!) 150/72 (24 0401)  SpO2: 95 % (24 0401) Vital Signs (24h Range):  Temp:  [98.1 °F (36.7 °C)] 98.1 °F (36.7 °C)  Pulse:  [77-92] 77  Resp:  [18-19] 18  SpO2:  [93 %-99 %] 95 %  BP: (138-178)/(72-99) 150/72     Weight: 95.3 kg (210 lb)  Body mass index is 38.41 kg/m².     Physical Exam  Vitals reviewed.   Constitutional:       General: She is awake. She is not in acute distress.     Appearance: Normal appearance. She is not ill-appearing or diaphoretic.   Cardiovascular:      Rate and Rhythm: Normal rate.      Heart sounds: Normal heart sounds. No murmur heard.     No friction rub. No gallop.      Comments: Trace BLE edema  Pulmonary:      Effort: Pulmonary effort is normal. No accessory muscle usage or respiratory distress.      Breath sounds: Normal breath sounds. No wheezing, rhonchi or rales.   Abdominal:      General: Bowel sounds are normal.      Palpations: Abdomen is soft.      Tenderness: There  is no abdominal tenderness. There is no guarding or rebound.   Musculoskeletal:      Comments: No obvious deformities noted to left hip.  No overlying skin changes or ecchymosis.  No tenderness to palpation.  Range of motion limited due to pain.    Slight swelling appreciated to left knee.  Small area of ecchymosis noted.  No tenderness to palpation.   Skin:     General: Skin is warm and dry.   Neurological:      Mental Status: She is alert and oriented to person, place, and time.   Psychiatric:         Behavior: Behavior is cooperative.                Significant Labs: All pertinent labs within the past 24 hours have been reviewed.  CBC:   Recent Labs   Lab 08/19/24 0436   WBC 5.98   HGB 9.5*   HCT 32.4*        CMP:   Recent Labs   Lab 08/19/24 0436      K 4.2      CO2 26   *   BUN 20   CREATININE 1.0   CALCIUM 10.7*   PROT 7.3   ALBUMIN 3.1*   BILITOT 0.2   ALKPHOS 63   AST 17   ALT 10   ANIONGAP 12       Significant Imaging:   Imaging Results               CT Hip Without Contrast Left (Final result)  Result time 08/19/24 01:06:29      Final result by Marlene Herrera MD (08/19/24 01:06:29)                   Impression:      As above described.    This report was flagged in Epic as abnormal.      Electronically signed by: Marlene Herrera  Date:    08/19/2024  Time:    01:06               Narrative:    EXAMINATION:  CT ABDOMEN PELVIS WITHOUT    CLINICAL HISTORY:  Left hip pain.    TECHNIQUE:  1.25 mm unenhanced axial images from the lung bases through the greater trochanters were performed.  Coronal and sagittal reformatted images were provided.    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  There is no hip dislocation.  There is no displaced fracture.  Degenerative changes are seen in the visualized lower lumbar spine.                                   "     X-Ray Hip 2 or 3 views Left with Pelvis when performed (Final result)  Result time 08/18/24 22:51:12      Final result by Marlene Herrera MD (08/18/24 22:51:12)                   Impression:      As above described.      Electronically signed by: Marlene Herrera  Date:    08/18/2024  Time:    22:51               Narrative:    EXAMINATION:  XR HIP WITH PELVIS WHEN PERFORMED 2 OR THREE VIEWS LEFT    CLINICAL HISTORY:  Pain in left hip    TECHNIQUE:  AP and lateral view of the left hip    COMPARISON:  None.    FINDINGS:  AP pelvis and two views of the left hip demonstrate an oblique lucency in the left intertrochanteric region.  Finding may represent artifact versus nondisplaced fracture.  There is mild degenerative changes at the hip joints.                                     Assessment/Plan:     * Left hip pain  Ms. Montoya is a 68 yr old female with a hx of HTN, dm type 2, left bundle branch block, CRT-D placement, breast cancer, NSCLC of RLL, obesity, depression, dilated cardiomyopathy, nonischemic cardiomyopathy, COPD, HLD who presents to the ED with a chief complaint of 10/10 constant sharp left hip pain that radiates to her left knee. Left hip x-ray showed AP pelvis and two views of the left hip demonstrate an oblique lucency in the left intertrochanteric region.  Finding may represent artifact versus nondisplaced fracture.  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  There is no hip dislocation.  There is no displaced fracture.  Degenerative changes are seen in the visualized lower lumbar spine."    - Orthopedics consulted, appreciate recs  - NPO  - Fracture vs possible mets vs other etiology?  - PRN analgesia  - Unable to get MRI per ortho recs while in the ED due to pacemaker    Moderate episode of recurrent major depressive disorder  - Continue home zoloft and " wellbutrin        Class 2 severe obesity due to excess calories with serious comorbidity in adult  Body mass index is 38.41 kg/m². Morbid obesity complicates all aspects of disease management from diagnostic modalities to treatment.      NSCLC of right lung  - Hx noted  - Continue OP F/U with heme/onc and radiation      History of breast cancer in female  - Hx noted  - Continue OP F/U with heme/onc and radiation      Cardiac resynchronization therapy defibrillator (CRT-D) in place  - Hx noted      Left bundle-branch block  - Hx noted      Type 2 diabetes mellitus without complication  Last A1c reviewed-   Lab Results   Component Value Date    HGBA1C 6.3 (H) 06/06/2024     Current correctional scale  Medium  Maintain anti-hyperglycemic dose as follows-   Antihyperglycemics (From admission, onward)      Start     Stop Route Frequency Ordered    08/19/24 0526  insulin aspart U-100 pen 0-10 Units         -- SubQ Every 6 hours PRN 08/19/24 0429          Hold Oral hypoglycemics while patient is in the hospital.    Essential hypertension  Chronic, Latest blood pressure and vitals reviewed-     Temp:  [98.1 °F (36.7 °C)]   Pulse:  [77-92]   Resp:  [18-19]   BP: (138-178)/(72-99)   SpO2:  [93 %-99 %] .   Home meds for hypertension were reviewed and noted below.   Hypertension Medications               amLODIPine (NORVASC) 5 MG tablet TAKE 1 TABLET BY MOUTH EVERY DAY    losartan (COZAAR) 100 MG tablet TAKE 1 TABLET BY MOUTH EVERY DAY    metoprolol succinate (TOPROL-XL) 100 MG 24 hr tablet TAKE 1 TABLET BY MOUTH EVERY DAY            While in the hospital, will manage blood pressure as follows; Continue home antihypertensive regimen    Will utilize p.r.n. blood pressure medication only if patient's blood pressure greater than 180/110 and she develops symptoms such as worsening chest pain or shortness of breath.      VTE Risk Mitigation (From admission, onward)           Ordered     enoxaparin injection 40 mg  Daily          08/19/24 0429     IP VTE HIGH RISK PATIENT  Once         08/19/24 0429     Place sequential compression device  Until discontinued         08/19/24 0429                         On 08/19/2024, patient should be placed in hospital observation services under my care in collaboration with Ramin Monroy MD.           RENETTA SnyderC  Department of Hospital Medicine  [facility] Dept

## 2024-08-19 NOTE — ED NOTES
Leads MRI compatible: No due to capped epicardial leads as noted in the patient chart on 08/12/2024, MD Davis made aware

## 2024-08-19 NOTE — NURSING
[facility]  Nurses Note -- 4 Eyes      8/19/2024       Skin assessed on: Q Shift      [x] No Pressure Injuries Present    [x]Prevention Measures Documented    [] Yes LDA  for Pressure Injury Previously documented     [] Yes New Pressure Injury Discovered   [] LDA for New Pressure Injury Added      Attending RN:  Blue Brooks LPN     Second RN:  aDnay Chinchilla RN

## 2024-08-19 NOTE — HPI
Ms. Montoya is a 68 yr old female with a hx of HTN, dm type 2, left bundle branch block, CRT-D placement, breast cancer, NSCLC of RLL, obesity, depression, dilated cardiomyopathy, nonischemic cardiomyopathy, COPD, HLD who presents to the ED with a chief complaint of 10/10 constant sharp left hip pain that radiates to her left knee. Patient endorses that left hip has been bothering her over the last month.  She states that she had been using a cane to get around and the pain progressively worsened.  She was seen by her oncologist who recommended gabapentin 300 mg TID.  She also endorses that due to the left knee swelling and left hip pain she had to began using a walker to get around at home.  She states that she was seen by a bone doctor who prescribed meloxicam and gave her a steroid shot in her knee.  About 1 week prior to today she was bringing in groceries and fell on her left side.  She endorses she was walking to the restroom with her cane yesterday and she heard a loud pop and began having excruciating pain and was unable to bear weight or ambulate with her left leg afterwards.  She endorses movement exacerbates her pain.  She states after treatment in the ED her pain is now 4-5/10.  She states that she quit smoking in 2016, rarely drinks alcohol, and denies recreational drug use.  She denies fever, chills, SOB, CP, nausea, vomiting, diarrhea, dysuria, hematuria, lightheadedness, dizziness, and syncope.  Of note, she sees Dr. Avina for Oncology and Dr. Lara for radiation.    Upon arrival to ED, patient afebrile, HR of 92, RR of 19, BP of 146/82, satting 99% on RA.  Workup in the ED included CBC, CMP, left hip x-ray, CT left hip.  Workup revealed H/H of 9.5/32.4, calcium of 2.7.  Left hip x-ray showed AP pelvis and two views of the left hip demonstrate an oblique lucency in the left intertrochanteric region.  Finding may represent artifact versus nondisplaced fracture.  There is mild degenerative  "changes at the hip joints."Left hip CT showed An expansile osteolytic lesion with beginning cortical destruction is seen intertrochanteric region.  There is no hip dislocation.  There is no displaced fracture.  Degenerative changes are seen in the visualized lower lumbar spine."Patient was given morphine 4 mg IV, ondansetron 4 mg IV, and orphenadrine 60 mg IM while in the ED.  Case discussed with ED provider and patient will be placed under observation for further management.  "

## 2024-08-19 NOTE — ASSESSMENT & PLAN NOTE
"Ms. Montoya is a 68 yr old female with a hx of HTN, dm type 2, left bundle branch block, CRT-D placement, breast cancer, NSCLC of RLL, obesity, depression, dilated cardiomyopathy, nonischemic cardiomyopathy, COPD, HLD who presents to the ED with a chief complaint of 10/10 constant sharp left hip pain that radiates to her left knee. Left hip x-ray showed AP pelvis and two views of the left hip demonstrate an oblique lucency in the left intertrochanteric region.  Finding may represent artifact versus nondisplaced fracture.  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  There is no hip dislocation.  There is no displaced fracture.  Degenerative changes are seen in the visualized lower lumbar spine."    - Orthopedics consulted, appreciate recs  - NPO  - Fracture vs possible mets vs other etiology?  - PRN analgesia  - Unable to get MRI per ortho recs while in the ED due to pacemaker  "

## 2024-08-19 NOTE — ED PROVIDER NOTES
"Encounter Date: 8/18/2024    SCRIBE #1 NOTE: I, Elizabeth Moriah, am scribing for, and in the presence of,  Schuyler Davis MD.       History     Chief Complaint   Patient presents with    Hip Pain    Knee Pain     Pt c/o L hip and knee pain that started tonight. Pt states she was using the rest room and heard a  " pop" then was unable to bear weight or ambulate with her L leg. L knee is swollen, reports this happened once before and was seen by an ortho dr. Pt denies trauma. Increased pain with movement.      67 yo F w/ PMHx of AICD, HTN, HLD, COPD, DM, NSCLC, presenting to the ED for L hip pain. Patient reports she has been followed by Dr Beckford from ortho for the past few weeks for L knee chondrocalcinosis, L hip arthritis. She had CT chest/abdo/pelvis negative for mets in hip last month. She reports Dr Beckford has prescribed her meloxicam 15 mg for her L hip and knee pain. She reports her L hip pain worsened today after she ambulated to the bathroom while using her walker and cane, and heard a pop, prompting her concern to the ED. She denies any concern for her knee or any associated pain today. No other exacerbating or alleviating factors. Denies fever, dysuria, hematuria, back pain, or other associated symptoms. No falls/trauma/injuries today.    The history is provided by the patient and medical records.     Review of patient's allergies indicates:   Allergen Reactions    Taxol [paclitaxel]      Hypersensitivity reaction to taxol, symptoms included shortness of breath, nausea, dizziness, flushing     Carboplatin Other (See Comments)     Itching and hives    Adhesive Rash     tegaderm burns and blistered skin     Past Medical History:   Diagnosis Date    AICD (automatic cardioverter/defibrillator) present     Asthma     bronchitis in past    Breast cancer 2016    right    Cardiac pacemaker     Cardiomyopathy     COPD (chronic obstructive pulmonary disease)     Diabetes mellitus, type 2     Hyperglycemia     " Hyperlipidemia     Hypertension     Malignant neoplasm of overlapping sites of female breast 2016    Nuclear sclerosis of both eyes 2020    Respiratory distress 3/12/2020     Past Surgical History:   Procedure Laterality Date    BIOPSY, WITH CT GUIDANCE Right 2023    Procedure: LUNG MASS BIOPSY, WITH CT GUIDANCE;  Surgeon: Yehuda Green MD;  Location: [facility] CATH LAB;  Service: Radiology;  Laterality: Right;    BREAST BIOPSY Right 2016    IDC    BREAST LUMPECTOMY Right     CARDIAC CATHETERIZATION Bilateral 2017    CARDIAC DEFIBRILLATOR PLACEMENT Left 08/10/2017    CARDIAC DEFIBRILLATOR PLACEMENT Left 2018     SECTION      x2    CHOLECYSTECTOMY      INSERTION OF TUNNELED CENTRAL VENOUS CATHETER (CVC) WITH SUBCUTANEOUS PORT Right 2020    Procedure: [id number], RIGHT;  Surgeon: Josefa Caceres MD;  Location: [address];  Service: General;  Laterality: Right;  Port-a-cath placed to R. IJ    LUNG BIOPSY N/A 2020    Procedure: BIOPSY, LUNG;  Surgeon: [facility];  Location: [facility] OR;  Service: Radiology;  Laterality: N/A;  8AM START  RN PREOP 2020---COVID NEGATIVE    NAVIGATIONAL BRONCHOSCOPY N/A 10/13/2020    Procedure: BRONCHOSCOPY, NAVIGATIONAL;  Surgeon: Jamilah Weaver MD;  Location: [address];  Service: Pulmonary;  Laterality: N/A;    REVISION OF IMPLANTABLE CARDIOVERTER-DEFIBRILLATOR (ICD) ELECTRODE LEAD PLACEMENT N/A 6/15/2018    Procedure: REVISION-LEAD-ICD;  Surgeon: Javy Gates MD;  Location: [facility] CATH LAB;  Service: Cardiology;  Laterality: N/A;  LBBB, Bi-V ICD HIS Elmo rev, MDT, Choice, MB, 3 Prep    ROBOT-ASSISTED LAPAROSCOPIC LYMPHADENECTOMY USING DA SALVADOR XI Right 10/23/2020    Procedure: XI ROBOTIC LYMPHADENECTOMY;  Surgeon: Ramo Tucker MD;  Location: [address];  Service: Thoracic;  Laterality: Right;    TUBAL LIGATION       Family History   Problem Relation Name Age of Onset    Kidney disease  Mother      Cataracts Mother      Kidney disease Father      Cataracts Father      Clotting disorder Brother      No Known Problems Daughter      No Known Problems Son      No Known Problems Sister      No Known Problems Maternal Aunt      No Known Problems Maternal Uncle      No Known Problems Paternal Aunt      No Known Problems Paternal Uncle      Macular degeneration Maternal Grandmother      No Known Problems Maternal Grandfather      No Known Problems Paternal Grandmother      No Known Problems Paternal Grandfather      Breast cancer Neg Hx      Ovarian cancer Neg Hx      Amblyopia Neg Hx      Blindness Neg Hx      Cancer Neg Hx      Diabetes Neg Hx      Glaucoma Neg Hx      Hypertension Neg Hx      Retinal detachment Neg Hx      Strabismus Neg Hx      Stroke Neg Hx      Thyroid disease Neg Hx       Social History     Tobacco Use    Smoking status: Former     Current packs/day: 0.00     Average packs/day: 0.5 packs/day for 40.0 years (20.0 ttl pk-yrs)     Types: Cigarettes     Start date: 1976     Quit date: 2016     Years since quittin.0     Passive exposure: Past    Smokeless tobacco: Never   Substance Use Topics    Alcohol use: Yes     Alcohol/week: 1.0 standard drink of alcohol     Types: 1 Glasses of wine per week     Comment: rare- holiday    Drug use: No     Review of Systems   Constitutional:  Negative for chills and fever.   HENT:  Negative for facial swelling and sore throat.    Eyes:  Negative for visual disturbance.   Respiratory:  Negative for cough and shortness of breath.    Cardiovascular:  Negative for chest pain and palpitations.   Gastrointestinal:  Negative for abdominal pain, nausea and vomiting.   Genitourinary:  Negative for dysuria and hematuria.   Musculoskeletal:  Positive for arthralgias (L hip) and gait problem (d/t pain). Negative for back pain.   Skin:  Negative for rash.   Neurological:  Negative for weakness and headaches.   Hematological:  Does not bruise/bleed  easily.   Psychiatric/Behavioral: Negative.         Physical Exam     Initial Vitals [08/18/24 2132]   BP Pulse Resp Temp SpO2   (!) 146/82 92 19 98.1 °F (36.7 °C) 99 %      MAP       --         Physical Exam    Nursing note and vitals reviewed.  Constitutional: She appears well-developed and well-nourished. She is not diaphoretic. No distress.   HENT:   Head: Normocephalic and atraumatic.   Right Ear: External ear normal.   Left Ear: External ear normal.   Nose: Nose normal.   Eyes: Conjunctivae are normal. No scleral icterus.   Neck: Neck supple. No tracheal deviation present.   Normal range of motion.  Cardiovascular:  Normal rate, regular rhythm and normal heart sounds.           Pulmonary/Chest: Breath sounds normal. No respiratory distress.   Abdominal: Abdomen is soft. Bowel sounds are normal. There is no abdominal tenderness.   Musculoskeletal:      Cervical back: Normal range of motion and neck supple.      Comments: Tenderness to the musculature of the L hip, unable to range more than 10 degrees with flexion d/t pain, actively and passively. No shortening of leg. Neurovascularly intact distally motor and sensory. No midline lower thoracic ttp, no step offs or deformities. L knee is nontender, stable, no overlying skin changes or warmth     Neurological: She is alert and oriented to person, place, and time.   Skin: Skin is warm and dry.   Psychiatric: She has a normal mood and affect. Thought content normal.         ED Course   Procedures  Labs Reviewed   CBC W/ AUTO DIFFERENTIAL - Abnormal       Result Value    WBC 5.98      RBC 4.13      Hemoglobin 9.5 (*)     Hematocrit 32.4 (*)     MCV 79 (*)     MCH 23.0 (*)     MCHC 29.3 (*)     RDW 17.7 (*)     Platelets 269      MPV 10.2      Immature Granulocytes 1.0 (*)     Gran # (ANC) 4.4      Immature Grans (Abs) 0.06 (*)     Lymph # 0.9 (*)     Mono # 0.6      Eos # 0.1      Baso # 0.04      nRBC 0      Gran % 73.1 (*)     Lymph % 14.7 (*)     Mono % 9.5       Eosinophil % 1.0      Basophil % 0.7      Differential Method Automated     COMPREHENSIVE METABOLIC PANEL - Abnormal    Sodium 143      Potassium 4.2      Chloride 105      CO2 26      Glucose 132 (*)     BUN 20      Creatinine 1.0      Calcium 10.7 (*)     Total Protein 7.3      Albumin 3.1 (*)     Total Bilirubin 0.2      Alkaline Phosphatase 63      AST 17      ALT 10      eGFR >60      Anion Gap 12     MAGNESIUM - Abnormal    Magnesium 1.4 (*)    POCT GLUCOSE - Abnormal    POCT Glucose 141 (*)    PHOSPHORUS    Phosphorus 3.1     PROTIME-INR    Prothrombin Time 11.0      INR 1.0     APTT    aPTT 29.8     POCT GLUCOSE, HAND-HELD DEVICE          Imaging Results               CT Hip Without Contrast Left (Final result)  Result time 08/19/24 01:06:29      Final result by Marlene Herrera MD (08/19/24 01:06:29)                   Impression:      As above described.    This report was flagged in Epic as abnormal.      Electronically signed by: Marlene Herrera  Date:    08/19/2024  Time:    01:06               Narrative:    EXAMINATION:  CT ABDOMEN PELVIS WITHOUT    CLINICAL HISTORY:  Left hip pain.    TECHNIQUE:  1.25 mm unenhanced axial images from the lung bases through the greater trochanters were performed.  Coronal and sagittal reformatted images were provided.    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  There is no hip dislocation.  There is no displaced fracture.  Degenerative changes are seen in the visualized lower lumbar spine.                                       X-Ray Hip 2 or 3 views Left with Pelvis when performed (Final result)  Result time 08/18/24 22:51:12      Final result by Marlene Herrera MD (08/18/24 22:51:12)                   Impression:      As above described.      Electronically signed by: Marlene Herrera  Date:    08/18/2024  Time:    22:51                Narrative:    EXAMINATION:  XR HIP WITH PELVIS WHEN PERFORMED 2 OR THREE VIEWS LEFT    CLINICAL HISTORY:  Pain in left hip    TECHNIQUE:  AP and lateral view of the left hip    COMPARISON:  None.    FINDINGS:  AP pelvis and two views of the left hip demonstrate an oblique lucency in the left intertrochanteric region.  Finding may represent artifact versus nondisplaced fracture.  There is mild degenerative changes at the hip joints.                                       Medications   sodium chloride 0.9% flush 10 mL (has no administration in time range)   naloxone 0.4 mg/mL injection 0.02 mg (has no administration in time range)   glucose chewable tablet 16 g (has no administration in time range)   glucose chewable tablet 24 g (has no administration in time range)   glucagon (human recombinant) injection 1 mg (has no administration in time range)   enoxaparin injection 40 mg (has no administration in time range)   acetaminophen tablet 650 mg (has no administration in time range)   senna-docusate 8.6-50 mg per tablet 1 tablet (has no administration in time range)   ondansetron injection 4 mg (has no administration in time range)   insulin aspart U-100 pen 0-10 Units (has no administration in time range)   melatonin tablet 6 mg (has no administration in time range)   aluminum-magnesium hydroxide-simethicone 200-200-20 mg/5 mL suspension 30 mL (has no administration in time range)   dextrose 10% bolus 125 mL 125 mL (has no administration in time range)   dextrose 10% bolus 250 mL 250 mL (has no administration in time range)   amLODIPine tablet 5 mg (has no administration in time range)   atorvastatin tablet 10 mg (has no administration in time range)   benzonatate capsule 200 mg (has no administration in time range)   buPROPion TB24 tablet 150 mg (has no administration in time range)   cetirizine tablet 10 mg (has no administration in time range)   gabapentin capsule 300 mg (has no administration in time range)    losartan tablet 100 mg (has no administration in time range)   metoprolol succinate (TOPROL-XL) 24 hr tablet 100 mg (has no administration in time range)   multivitamin tablet (has no administration in time range)   pantoprazole EC tablet 40 mg (has no administration in time range)   sertraline tablet 100 mg (has no administration in time range)   magnesium sulfate 2g in water 50mL IVPB (premix) (has no administration in time range)   orphenadrine injection 60 mg (60 mg Intramuscular Given 8/18/24 2435)   morphine injection 4 mg (4 mg Intravenous Given 8/19/24 0436)   ondansetron injection 4 mg (4 mg Intravenous Given 8/19/24 0437)     Medical Decision Making  Amount and/or Complexity of Data Reviewed  Labs: ordered.  Radiology: ordered. Decision-making details documented in ED Course.    Risk  Prescription drug management.            Scribe Attestation:   Scribe #1: I performed the above scribed service and the documentation accurately describes the services I performed. I attest to the accuracy of the note.        ED Course as of 08/19/24 0706   Sun Aug 18, 2024   2324 X-Ray Hip 2 or 3 views Left with Pelvis when performed  FINDINGS:  AP pelvis and two views of the left hip demonstrate an oblique lucency in the left intertrochanteric region.  Finding may represent artifact versus nondisplaced fracture.  There is mild degenerative changes at the hip joints.     Impression:     As above described.      [CC]   Mon Aug 19, 2024   0136 CT Hip Without Contrast Left(!)  FINDINGS:  An expansile osteolytic lesion with beginning cortical destruction is seen intertrochanteric region.  There is no hip dislocation.  There is no displaced fracture.  Degenerative changes are seen in the visualized lower lumbar spine.     Impression:     As above described.     This report was flagged in Epic as abnormal.      [CC]   0703 Patient was a 68-year-old female presenting to the emergency department for evaluation of left hip pain.  She notes a pop to her left hip earlier today. X-ray concerning for possible nondisplaced fracture of the hip. CT shows an osteolytic area. Orthopedics looked at the imaging in a unsure if this represents nondisplaced fracture. They wanted an MRI but patient was unable to get an MRI due to her pacemaker. Plan is to have patient admitted for pain control, physical therapy and orthopedic evaluation in the morning.  Basic labs pending.   [CC]   0704   After review of the patient's physical exam, ED testing, and history/symptoms, the patient requires additional care in the hospital for the treatment of illness(es) outlined in mdm . Discussed patients case with [facility] who will accept the patient and any pending labs/imaging/interventions.   I discussed the objective findings with the patient including laboratory studies, diagnostic imaging, and any consultant involvement. The patient/ family was educated on their clinical presentation and all questions were answered. They acknowledged and verbally agreed to the treatment plan. The patient will be admitted to the hospital for further management.      [CC]      ED Course User Index  [CC] Schuyler Davis MD                       ISchuyler MD, personally performed the services described in this documentation. All medical record entries made by the scribe were at my direction and in my presence. I have reviewed the chart and agree that the record reflects my personal performance and is accurate and complete.      DISCLAIMER: This note was prepared with [employer] voice recognition transcription software. Garbled syntax, mangled pronouns, and other bizarre constructions may be attributed to that software system.      Clinical Impression:  Final diagnoses:  [M25.552] Left hip pain  [M89.50] Osteolytic lesion (Primary)          ED Disposition Condition    Observation Stable                Schuyler Davis MD  08/19/24 0706

## 2024-08-19 NOTE — ED NOTES
Pt's note from 8/12/2024    The patients' CIED has reached ELECTIVE REPLACEMENT.      Current Device  and Model:  MDT Viva S CRTD (HIS Lead in LV port)     Date of LATOYA, if known: 8/10/24     Is this replacement indicator early or unexpected due to an advisory:  No     Battery Voltage (if available): N/A     Pacemaker Dependent:  No     Anticoagulation Status:  None     Last EF: 60% 10/14/20     Model of Leads: RA 4076, RV 6935, LV port is HIS lead 3830     Any known lead recalls:  No     Leads MRI compatible:  No due to capped epicardial leads.     Any history of treated ventricular arrhythmias (ATP or shocks)?  No     Any history of device treated atrial arrhythmias (atrial ATP)?  No

## 2024-08-19 NOTE — CONSULTS
NEW PATIENT ORTHOPAEDIC: HIP    PRIMARY CARE PHYSICIAN: Emanuel Chavez MD   REFERRING PROVIDER: Self, Aaareferral  No address on file     ASSESSMENT & PLAN:    Impression:  Left Hip Impending Fracture likely pathologic from mets     68 yr old female with a hx of HTN, dm type 2, left bundle branch block, CRT-D placement, breast cancer, NSCLC of RLL, obesity, depression, dilated cardiomyopathy, nonischemic cardiomyopathy, COPD, HLD who presents to the ED with a chief complaint of 10/10 constant sharp left hip pain that radiates to her left knee. Patient endorses that left hip has been bothering her over the last month. She states that she had been using a cane to get around and the pain progressively worsened. She was seen by her oncologist who recommended gabapentin 300 mg TID. She also endorses that due to the left knee swelling and left hip pain she had to began using a walker to get around at home. She states that she was seen by a bone Dr. who prescribed meloxicam and gave her a steroid shot in her knee. About 1 week prior to today she was bringing in groceries and fell on her left side. She endorses she was walking to the restroom with her cane yesterday and she heard a loud pop and began having excruciating pain and was unable to bear weight or ambulate with her left leg afterwards. She endorses movement exacerbates her pain. She states after treatment in the ED her pain is now 4-5/10. She states that she quit smoking in 2016, rarely drinks alcohol, and denies recreational drug use. She denies fever, chills, SOB, CP, nausea, vomiting, diarrhea, dysuria, hematuria, lightheadedness, dizziness, and syncope. Of note, she sees Dr. Avina for Oncology and Dr. Lara for radiation.       Orthopedics was consult for this patient who experienced a pop when using the restroom yesterday and unable to place weight or ambulate on her leg.  She did have preceding few week history of having left hip pain.    Patient is  compliant and follows up regularly with regard to her cancer diagnosis and treatment plan.  Her current CT demonstrates a relatively large lana trochanteric osteolytic lesion with some concern for lateral cortical disruption.  In the setting of her previous cancer diagnosis I feel there is a high likelihood that this is metastatic disease.  I reviewed a chest abdomen pelvis CT scan that was obtained less than a few weeks ago.  Per the radiology report there was no mention of a osteolytic lesion and there was just suggestion of multilevel degenerative disc disease of her spine and hip arthritis.  However my review of that CT does demonstrate a concerning lesion.  Relative to her new CT it appears to have expanded on the imaging that is now dedicated to her hip. Her PET CT from February showed no hypermetabolic lesions in her bones.    I am concerned with her inability to ambulate and transfer safely.  I think that this likely needs prophylactic stabilization with long intramedullary nail.  This surgical recommendation was discussed with the patient.  She had many questions regarding her cancer diagnosis and treatment plan in terms of her immunotherapy, a chemotherapy, radiation, need for PET scans etc. all of those questions I am unable to answer with regard to her condition.  What complicates the matter is she is unable to obtain an MRI secondary to having a pacemaker that is incompatible with the MRI.  While I do think her pains possibly related to this impending fracture I can not rule out other musculoskeletal etiologies.    I discussed options with her which would consist of proceeding forward with surgical intervention despite some of these uncertainties in efforts to protect the Bone and prevent propagation of her fracture.  I think potentially she could try to get to the outpatient setting and not place any weight on this leg but I think that would be difficult and she is at high risk for falls and fracture  plus there is a delay and her potential care while moving to the outpatient setting.  As a result I think the safest thing to do for the patient that would help alleviate her concerns and get her established with the more appropriate Oncology and Orthopedic Oncology teams would be to have her transferred.  We are going to put in for that request.     For now, non weight bearing to LLE.     ACTIVE PROBLEM LIST  Patient Active Problem List   Diagnosis    Essential hypertension    Type 2 diabetes mellitus without complication    Seasonal allergies    Left bundle-branch block    Dilated cardiomyopathy    NICM (nonischemic cardiomyopathy)    Cardiac resynchronization therapy defibrillator (CRT-D) in place    History of breast cancer in female    Lung mass    Refractive error    Nuclear sclerosis of both eyes    NSCLC of right lung    Class 2 severe obesity due to excess calories with serious comorbidity in adult    Abnormal thyroid function test    Moderate episode of recurrent major depressive disorder    Left hip pain           SUBJECTIVE    CHIEF COMPLAINT: Hip Pain    HPI:   Hannah Montoya is a 68 y.o. female here for evaluation and management of left hip pain. There is a specific incident that brought about this pain. she has had progressive problems with the hip(s) starting 1 days ago and is progressing which is now interfering with activities which include: walking, functional household ADL's, and rising from a sitting position    Currently the pain in the joint is severe with activity.  The pain is constant and is located in lateral hip, groin, and thigh.  The pain is described as severe and sharp. Relieving factors include ambulatory device, rest, prescription medication, and repositioning. Yes associated Popping.     They do not report leg length inequality.     Hannah Montoya has no additional complaints.     PROGRESSIVE SYMPTOMS:  Pain worsened by weight bearing  Pain effecting living  situation    FUNCTIONAL STATUS:   Walk a block or two on level ground     REVIEW OF SYSTEMS:  PAIN ASSESSMENT:  See HPI.  MUSCULOSKELETAL: See HPI.  OTHER 10 point review of systems is negative except as stated in HPI above    PAST MEDICAL HISTORY   has a past medical history of AICD (automatic cardioverter/defibrillator) present, Asthma, Breast cancer (), Cardiac pacemaker, Cardiomyopathy, COPD (chronic obstructive pulmonary disease), Diabetes mellitus, type 2, Hyperglycemia, Hyperlipidemia, Hypertension, Malignant neoplasm of overlapping sites of female breast (2016), Nuclear sclerosis of both eyes (2020), and Respiratory distress (3/12/2020).     PAST SURGICAL HISTORY   has a past surgical history that includes Cholecystectomy;  section; Tubal ligation; Breast biopsy (Right, 2016); Breast lumpectomy (Right); Revision of implantable cardioverter-defibrillator (ICD) electrode lead placement (N/A, 6/15/2018); Cardiac catheterization (Bilateral, 2017); Cardiac defibrillator placement (Left, 08/10/2017); Cardiac defibrillator placement (Left, 2018); Lung biopsy (N/A, 2020); Navigational bronchoscopy (N/A, 10/13/2020); Robot-assisted laparoscopic lymphadenectomy using da Temi Xi (Right, 10/23/2020); Insertion of tunneled central venous catheter (CVC) with subcutaneous port (Right, 2020); and biopsy, with ct guidance (Right, 2023).     FAMILY HISTORY  family history includes Cataracts in her father and mother; Clotting disorder in her brother; Kidney disease in her father and mother; Macular degeneration in her maternal grandmother; No Known Problems in her daughter, maternal aunt, maternal grandfather, maternal uncle, paternal aunt, paternal grandfather, paternal grandmother, paternal uncle, sister, and son.     SOCIAL HISTORY   reports that she quit smoking about 8 years ago. Her smoking use included cigarettes. She started smoking about 48 years ago. She has a 20  pack-year smoking history. She has been exposed to tobacco smoke. She has never used smokeless tobacco. She reports current alcohol use of about 1.0 standard drink of alcohol per week. She reports that she does not use drugs.     ALLERGIES   Review of patient's allergies indicates:   Allergen Reactions    Taxol [paclitaxel]      Hypersensitivity reaction to taxol, symptoms included shortness of breath, nausea, dizziness, flushing     Carboplatin Other (See Comments)     Itching and hives    Adhesive Rash     tegaderm burns and blistered skin        MEDICATIONS   Current Facility-Administered Medications on File Prior to Encounter   Medication Dose Route Frequency Provider Last Rate Last Admin    fentaNYL injection 25 mcg  25 mcg Intravenous Q5 Min PRKeesha Mirza MD        haloperidol lactate injection 0.5 mg  0.5 mg Intravenous Once PRKeesha Mirza MD        HYDROmorphone injection 0.2 mg  0.2 mg Intravenous Q5 Min PRKeesha Mirza MD        ondansetron injection 4 mg  4 mg Intravenous Once PRKeesha Mirza MD        sodium chloride 0.9% flush 10 mL  10 mL Intravenous PRKeesha Mirza MD         Current Outpatient Medications on File Prior to Encounter   Medication Sig Dispense Refill    ACCU-CHEK ALFRED PLUS TEST STRP Strp USE TO TEST THREE TIMES DAILY 200 strip 3    albuterol (ACCUNEB) 1.25 mg/3 mL Nebu use 1 AMPULE (3ml) via NEBULIZER EVERY 6 HOURS AS NEEDED 300 mL 3    albuterol (VENTOLIN HFA) 90 mcg/actuation inhaler Inhale 2 puffs into the lungs every 4 (four) hours as needed for Wheezing or Shortness of Breath. Rescue 18 g 4    amLODIPine (NORVASC) 5 MG tablet TAKE 1 TABLET BY MOUTH EVERY DAY 90 tablet 2    atorvastatin (LIPITOR) 10 MG tablet TAKE 1 TABLET BY MOUTH EVERY DAY 90 tablet 1    benzonatate (TESSALON) 200 MG capsule Take 1 capsule (200 mg total) by mouth 3 (three) times daily as needed for Cough. 30 capsule 1    betamethasone dipropionate 0.05 % cream Apply topically 2 (two) times daily as needed  (rash). (Patient not taking: Reported on 5/2/2024) 45 g 2    buPROPion (WELLBUTRIN XL) 150 MG TB24 tablet TAKE 1 TABLET BY MOUTH EVERY DAY 90 tablet 3    cetirizine (ZYRTEC) 10 MG tablet Take 10 mg by mouth every evening.       FLUAD QUAD 2023-24,65Y UP,,PF, 60 mcg (15 mcg x 4)/0.5 mL Syrg       gabapentin (NEURONTIN) 300 MG capsule Take 1 capsule (300 mg total) by mouth 3 (three) times daily. 90 capsule 11    losartan (COZAAR) 100 MG tablet TAKE 1 TABLET BY MOUTH EVERY DAY 90 tablet 3    meloxicam (MOBIC) 15 MG tablet Take 1 tablet (15 mg total) by mouth once daily. 30 tablet 0    metFORMIN (GLUCOPHAGE) 500 MG tablet TAKE 2 TABLETS BY MOUTH TWICE A DAY WITH MEALS 360 tablet 3    metoprolol succinate (TOPROL-XL) 100 MG 24 hr tablet TAKE 1 TABLET BY MOUTH EVERY DAY 90 tablet 3    multivitamin (THERAGRAN) per tablet Take 1 tablet by mouth once daily.      mupirocin (BACTROBAN) 2 % ointment Apply topically 3 (three) times daily. (Patient not taking: Reported on 5/2/2024) 30 g 1    OMNIPAQUE 300 300 mg iodine/mL injection  (Patient not taking: Reported on 5/2/2024)      OMNIPAQUE 350 350 mg iodine/mL Soln injection  (Patient not taking: Reported on 5/2/2024)      ondansetron (ZOFRAN-ODT) 8 MG TbDL Take 1 tablet (8 mg total) by mouth every 8 (eight) hours as needed (nausea/vomiting). Take 1 tablet (8 mg) by mouth every 8 hours as needed for nausea/vomiting. 60 tablet 5    OPDIVO 120 mg/12 mL Soln  (Patient not taking: Reported on 5/2/2024)      OPDIVO 240 mg/24 mL Soln  (Patient not taking: Reported on 5/2/2024)      OPDIVO 40 mg/4 mL injection  (Patient not taking: Reported on 5/2/2024)      palonosetron (ALOXI) 0.25 mg/5 mL injection       pantoprazole (PROTONIX) 40 MG tablet TAKE 1 TABLET BY MOUTH EVERY DAY 90 tablet 3    READI-CAT 2 2 % (w/v) suspension       READI-CAT 2 2.1 % (w/v), 2.0 % (w/w) suspension       semaglutide (OZEMPIC) 0.25 mg or 0.5 mg(2 mg/1.5 mL) pen injector Inject 0.5 mg into the skin every 7  "days. (Patient not taking: Reported on 5/2/2024) 1.5 mL 6    sertraline (ZOLOFT) 100 MG tablet TAKE 1 TABLET BY MOUTH EVERY DAY IN THE EVENING 90 tablet 2    tiotropium (SPIRIVA WITH HANDIHALER) 18 mcg inhalation capsule INHALE THE CONTENTS OF 1 CAPSULE BY MOUTH EVERY DAY 90 capsule 3    triamcinolone acetonide 0.1% (KENALOG) 0.1 % cream APPLY TOPICALLY TWICE A DAY (Patient not taking: Reported on 5/2/2024) 454 g 2    WIXELA INHUB 250-50 mcg/dose diskus inhaler INHALE 1 PUFF INTO THE LUNGS 2 (TWO) TIMES DAILY. CONTROLLER 180 each 3    YERVOY 50 mg/10 mL (5 mg/mL)  (Patient not taking: Reported on 5/2/2024)      [DISCONTINUED] methylPREDNISolone (MEDROL DOSEPACK) 4 mg tablet use as directed (Patient not taking: Reported on 5/2/2024) 21 each 0          PHYSICAL EXAM   height is 5' 2" (1.575 m) and weight is 95.3 kg (210 lb). Her temperature is 97.6 °F (36.4 °C). Her blood pressure is 147/71 (abnormal) and her pulse is 76. Her respiration is 18 and oxygen saturation is 94% (abnormal).   Body mass index is 38.41 kg/m².      All other systems deferred.  GENERAL:  No acute distress  HABITUS: Obese  SKIN: Normal     HIP EXAM:    left:   ROM: Note tested  No apparent leg length discrepancy  Painful Log Roll of Hip  Palpation: Yes tenderness over Greater trochanter and IT band   Pain is reproduced with IR or ER of the hip  Strength: 5/5 hip flexion, abduction, knee flexion and extension   Straight leg raise: Negative   Neurovascular Status: Sensation intact to light touch in Sural, saphenous, SPN, DPN, Tibial nerve distribution  2+ pulse DP/PT, normal capillary refill, foot has normal warmth    DATA:  Diagnostic tests reviewed for today's visit:     AP pelvis, hip, and lateral radiographs shows narrowing of the superior joint space with an oblique lucency in the left intertrochanteric region     CT Scan: CT images and report reviewed which demonstrate expansile osteolytic lesion with beginning cortical destruction is seen " intertrochanteric region.

## 2024-08-19 NOTE — ED NOTES
Spoke to MRI tech to confirm if MRI can be done on a patient with Defib/pacemaker at this facility. MRI stated that the patient with defib/pacemaker has to be transferred to [facility] for MRI, MD davalos made aware

## 2024-08-19 NOTE — NURSING
Patient arrived to floor via transporter tech from ED. Patient transferred to bed via 1x person assist/independently. AAOX4. Patient was oriented to room, information on communication board, and medication regimen. Tele monitor on and monitor tech notified.Bed low adequate lighting provided, side rails x2 up, call bell in reach. Admission assessment completed. Vitals per chart. Patient denied having any acute distress at this time. None observed. Family at bedside.

## 2024-08-19 NOTE — ASSESSMENT & PLAN NOTE
Last A1c reviewed-   Lab Results   Component Value Date    HGBA1C 6.3 (H) 06/06/2024     Current correctional scale  Medium  Maintain anti-hyperglycemic dose as follows-   Antihyperglycemics (From admission, onward)      Start     Stop Route Frequency Ordered    08/19/24 0526  insulin aspart U-100 pen 0-10 Units         -- SubQ Every 6 hours PRN 08/19/24 0428          Hold Oral hypoglycemics while patient is in the hospital.

## 2024-08-19 NOTE — SUBJECTIVE & OBJECTIVE
Past Medical History:   Diagnosis Date    AICD (automatic cardioverter/defibrillator) present     Asthma     bronchitis in past    Breast cancer 2016    right    Cardiac pacemaker     Cardiomyopathy     COPD (chronic obstructive pulmonary disease)     Diabetes mellitus, type 2     Hyperglycemia     Hyperlipidemia     Hypertension     Malignant neoplasm of overlapping sites of female breast 2016    Nuclear sclerosis of both eyes 2020    Respiratory distress 3/12/2020       Past Surgical History:   Procedure Laterality Date    BIOPSY, WITH CT GUIDANCE Right 2023    Procedure: LUNG MASS BIOPSY, WITH CT GUIDANCE;  Surgeon: Yehuda Green MD;  Location: [facility] CATH LAB;  Service: Radiology;  Laterality: Right;    BREAST BIOPSY Right 2016    IDC    BREAST LUMPECTOMY Right     CARDIAC CATHETERIZATION Bilateral 2017    CARDIAC DEFIBRILLATOR PLACEMENT Left 08/10/2017    CARDIAC DEFIBRILLATOR PLACEMENT Left 2018     SECTION      x2    CHOLECYSTECTOMY      INSERTION OF TUNNELED CENTRAL VENOUS CATHETER (CVC) WITH SUBCUTANEOUS PORT Right 2020    Procedure: [id number], RIGHT;  Surgeon: Josefa Caceres MD;  Location: [address];  Service: General;  Laterality: Right;  Port-a-cath placed to R. IJ    LUNG BIOPSY N/A 2020    Procedure: BIOPSY, LUNG;  Surgeon: [facility] Diagnostic Provider;  Location: [facility] OR;  Service: Radiology;  Laterality: N/A;  8AM START  RN PREOP 2020---COVID NEGATIVE    NAVIGATIONAL BRONCHOSCOPY N/A 10/13/2020    Procedure: BRONCHOSCOPY, NAVIGATIONAL;  Surgeon: Jamilah Weaver MD;  Location: [address];  Service: Pulmonary;  Laterality: N/A;    REVISION OF IMPLANTABLE CARDIOVERTER-DEFIBRILLATOR (ICD) ELECTRODE LEAD PLACEMENT N/A 6/15/2018    Procedure: REVISION-LEAD-ICD;  Surgeon: Javy Gates MD;  Location: [facility] CATH LAB;  Service: Cardiology;  Laterality: N/A;  LBBB, Bi-V ICD HIS Elmo jordan, MDT, Choice, MB, 3 Prep     ROBOT-ASSISTED LAPAROSCOPIC LYMPHADENECTOMY USING DA SALVADOR XI Right 10/23/2020    Procedure: XI ROBOTIC LYMPHADENECTOMY;  Surgeon: Ramo Tucker MD;  Location: [facility] OR [address] [place] [address];  Service: Thoracic;  Laterality: Right;    TUBAL LIGATION         Review of patient's allergies indicates:   Allergen Reactions    Taxol [paclitaxel]      Hypersensitivity reaction to taxol, symptoms included shortness of breath, nausea, dizziness, flushing     Carboplatin Other (See Comments)     Itching and hives    Adhesive Rash     tegaderm burns and blistered skin       Current Facility-Administered Medications on File Prior to Encounter   Medication    fentaNYL injection 25 mcg    haloperidol lactate injection 0.5 mg    HYDROmorphone injection 0.2 mg    ondansetron injection 4 mg    sodium chloride 0.9% flush 10 mL     Current Outpatient Medications on File Prior to Encounter   Medication Sig    ACCU-CHEK ALFRED PLUS TEST STRP Strp USE TO TEST THREE TIMES DAILY    albuterol (ACCUNEB) 1.25 mg/3 mL Nebu use 1 AMPULE (3ml) via NEBULIZER EVERY 6 HOURS AS NEEDED    albuterol (VENTOLIN HFA) 90 mcg/actuation inhaler Inhale 2 puffs into the lungs every 4 (four) hours as needed for Wheezing or Shortness of Breath. Rescue    amLODIPine (NORVASC) 5 MG tablet TAKE 1 TABLET BY MOUTH EVERY DAY    atorvastatin (LIPITOR) 10 MG tablet TAKE 1 TABLET BY MOUTH EVERY DAY    benzonatate (TESSALON) 200 MG capsule Take 1 capsule (200 mg total) by mouth 3 (three) times daily as needed for Cough.    betamethasone dipropionate 0.05 % cream Apply topically 2 (two) times daily as needed (rash). (Patient not taking: Reported on 5/2/2024)    buPROPion (WELLBUTRIN XL) 150 MG TB24 tablet TAKE 1 TABLET BY MOUTH EVERY DAY    cetirizine (ZYRTEC) 10 MG tablet Take 10 mg by mouth every evening.     FLUAD QUAD 2023-24,65Y UP,,PF, 60 mcg (15 mcg x 4)/0.5 mL Syrg     gabapentin (NEURONTIN) 300 MG capsule Take 1 capsule (300 mg total) by mouth 3 (three) times daily.     losartan (COZAAR) 100 MG tablet TAKE 1 TABLET BY MOUTH EVERY DAY    meloxicam (MOBIC) 15 MG tablet Take 1 tablet (15 mg total) by mouth once daily.    metFORMIN (GLUCOPHAGE) 500 MG tablet TAKE 2 TABLETS BY MOUTH TWICE A DAY WITH MEALS    methylPREDNISolone (MEDROL DOSEPACK) 4 mg tablet use as directed (Patient not taking: Reported on 5/2/2024)    metoprolol succinate (TOPROL-XL) 100 MG 24 hr tablet TAKE 1 TABLET BY MOUTH EVERY DAY    multivitamin (THERAGRAN) per tablet Take 1 tablet by mouth once daily.    mupirocin (BACTROBAN) 2 % ointment Apply topically 3 (three) times daily. (Patient not taking: Reported on 5/2/2024)    OMNIPAQUE 300 300 mg iodine/mL injection  (Patient not taking: Reported on 5/2/2024)    OMNIPAQUE 350 350 mg iodine/mL Soln injection  (Patient not taking: Reported on 5/2/2024)    ondansetron (ZOFRAN-ODT) 8 MG TbDL Take 1 tablet (8 mg total) by mouth every 8 (eight) hours as needed (nausea/vomiting). Take 1 tablet (8 mg) by mouth every 8 hours as needed for nausea/vomiting.    OPDIVO 120 mg/12 mL Soln  (Patient not taking: Reported on 5/2/2024)    OPDIVO 240 mg/24 mL Soln  (Patient not taking: Reported on 5/2/2024)    OPDIVO 40 mg/4 mL injection  (Patient not taking: Reported on 5/2/2024)    palonosetron (ALOXI) 0.25 mg/5 mL injection     pantoprazole (PROTONIX) 40 MG tablet TAKE 1 TABLET BY MOUTH EVERY DAY    READI-CAT 2 2 % (w/v) suspension     READI-CAT 2 2.1 % (w/v), 2.0 % (w/w) suspension     semaglutide (OZEMPIC) 0.25 mg or 0.5 mg(2 mg/1.5 mL) pen injector Inject 0.5 mg into the skin every 7 days. (Patient not taking: Reported on 5/2/2024)    sertraline (ZOLOFT) 100 MG tablet TAKE 1 TABLET BY MOUTH EVERY DAY IN THE EVENING    tiotropium (SPIRIVA WITH HANDIHALER) 18 mcg inhalation capsule INHALE THE CONTENTS OF 1 CAPSULE BY MOUTH EVERY DAY    triamcinolone acetonide 0.1% (KENALOG) 0.1 % cream APPLY TOPICALLY TWICE A DAY (Patient not taking: Reported on 5/2/2024)    WIXELA INHUB 250-84  mcg/dose diskus inhaler INHALE 1 PUFF INTO THE LUNGS 2 (TWO) TIMES DAILY. CONTROLLER    YERVOY 50 mg/10 mL (5 mg/mL)  (Patient not taking: Reported on 2024)     Family History       Problem Relation (Age of Onset)    Cataracts Mother, Father    Clotting disorder Brother    Kidney disease Mother, Father    Macular degeneration Maternal Grandmother    No Known Problems Daughter, Son, Sister, Maternal Aunt, Maternal Uncle, Paternal Aunt, Paternal Uncle, Maternal Grandfather, Paternal Grandmother, Paternal Grandfather          Tobacco Use    Smoking status: Former     Current packs/day: 0.00     Average packs/day: 0.5 packs/day for 40.0 years (20.0 ttl pk-yrs)     Types: Cigarettes     Start date: 1976     Quit date: 2016     Years since quittin.0     Passive exposure: Past    Smokeless tobacco: Never   Substance and Sexual Activity    Alcohol use: Yes     Alcohol/week: 1.0 standard drink of alcohol     Types: 1 Glasses of wine per week     Comment: rare- holiday    Drug use: No    Sexual activity: Not Currently     Partners: Male     Comment:      Review of Systems   Constitutional:  Negative for chills and fever.   Respiratory:  Negative for shortness of breath.    Cardiovascular:  Negative for chest pain.   Gastrointestinal:  Negative for abdominal pain, diarrhea, nausea and vomiting.   Genitourinary:  Negative for dysuria and hematuria.   Musculoskeletal:  Positive for arthralgias (left hip and left knee) and joint swelling (left knee).   Neurological:  Negative for dizziness, syncope and light-headedness.     Objective:     Vital Signs (Most Recent):  Temp: 98.1 °F (36.7 °C) (242)  Pulse: 77 (24 0401)  Resp: 18 (24 0436)  BP: (!) 150/72 (24 0401)  SpO2: 95 % (24 0401) Vital Signs (24h Range):  Temp:  [98.1 °F (36.7 °C)] 98.1 °F (36.7 °C)  Pulse:  [77-92] 77  Resp:  [18-19] 18  SpO2:  [93 %-99 %] 95 %  BP: (138-178)/(72-99) 150/72     Weight: 95.3 kg (210  lb)  Body mass index is 38.41 kg/m².     Physical Exam  Vitals reviewed.   Constitutional:       General: She is awake. She is not in acute distress.     Appearance: Normal appearance. She is not ill-appearing or diaphoretic.   Cardiovascular:      Rate and Rhythm: Normal rate.      Heart sounds: Normal heart sounds. No murmur heard.     No friction rub. No gallop.      Comments: Trace BLE edema  Pulmonary:      Effort: Pulmonary effort is normal. No accessory muscle usage or respiratory distress.      Breath sounds: Normal breath sounds. No wheezing, rhonchi or rales.   Abdominal:      General: Bowel sounds are normal.      Palpations: Abdomen is soft.      Tenderness: There is no abdominal tenderness. There is no guarding or rebound.   Musculoskeletal:      Comments: No obvious deformities noted to left hip.  No overlying skin changes or ecchymosis.  No tenderness to palpation.  Range of motion limited due to pain.    Slight swelling appreciated to left knee.  Small area of ecchymosis noted.  No tenderness to palpation.   Skin:     General: Skin is warm and dry.   Neurological:      Mental Status: She is alert and oriented to person, place, and time.   Psychiatric:         Behavior: Behavior is cooperative.                Significant Labs: All pertinent labs within the past 24 hours have been reviewed.  CBC:   Recent Labs   Lab 08/19/24  0436   WBC 5.98   HGB 9.5*   HCT 32.4*        CMP:   Recent Labs   Lab 08/19/24  0436      K 4.2      CO2 26   *   BUN 20   CREATININE 1.0   CALCIUM 10.7*   PROT 7.3   ALBUMIN 3.1*   BILITOT 0.2   ALKPHOS 63   AST 17   ALT 10   ANIONGAP 12       Significant Imaging:   Imaging Results               CT Hip Without Contrast Left (Final result)  Result time 08/19/24 01:06:29      Final result by Marlene Herrera MD (08/19/24 01:06:29)                   Impression:      As above described.    This report was flagged in Epic as abnormal.      Electronically  signed by: Marlene Herrera  Date:    08/19/2024  Time:    01:06               Narrative:    EXAMINATION:  CT ABDOMEN PELVIS WITHOUT    CLINICAL HISTORY:  Left hip pain.    TECHNIQUE:  1.25 mm unenhanced axial images from the lung bases through the greater trochanters were performed.  Coronal and sagittal reformatted images were provided.    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  There is no hip dislocation.  There is no displaced fracture.  Degenerative changes are seen in the visualized lower lumbar spine.                                       X-Ray Hip 2 or 3 views Left with Pelvis when performed (Final result)  Result time 08/18/24 22:51:12      Final result by Marlene Herrera MD (08/18/24 22:51:12)                   Impression:      As above described.      Electronically signed by: Marlene Herrera  Date:    08/18/2024  Time:    22:51               Narrative:    EXAMINATION:  XR HIP WITH PELVIS WHEN PERFORMED 2 OR THREE VIEWS LEFT    CLINICAL HISTORY:  Pain in left hip    TECHNIQUE:  AP and lateral view of the left hip    COMPARISON:  None.    FINDINGS:  AP pelvis and two views of the left hip demonstrate an oblique lucency in the left intertrochanteric region.  Finding may represent artifact versus nondisplaced fracture.  There is mild degenerative changes at the hip joints.

## 2024-08-19 NOTE — ED TRIAGE NOTES
Patient presents to ED for the evaluation of left sided hip pain. Patient reports a fall 2 weeks ago , landed on the left side . Patient states she was having left sided hip pain for quite some time before the fall but got severe post fall. Patient also reports left knee pain and swelling post fall. No wounds noted to the affected site    Patient states she heard a POP while ambulating to the bathroom earlier today , unable to bear weight or ambulate with left leg that prompted her to visit ER.

## 2024-08-19 NOTE — CARE UPDATE
"Admitted for left hip pain       patient afebrile, HR of 92, RR of 19, BP of 146/82, satting 99% on RA.  Workup in the ED included CBC, CMP, left hip x-ray, CT left hip.  Workup revealed H/H of 9.5/32.4, calcium of 2.7.  Left hip x-ray showed AP pelvis and two views of the left hip demonstrate an oblique lucency in the left intertrochanteric region.  Finding may represent artifact versus nondisplaced fracture.  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  There is no hip dislocation.  There is no displaced fracture.  Degenerative changes are seen in the visualized lower lumbar spine."Patient was given morphine 4 mg IV, ondansetron 4 mg IV, and orphenadrine 60 mg IM while in the ED.     Patient seen and examined  Reviewed H&P, labs, and vital signs  Continue with current medical regimen  Awaiting orthopedics recs  Will try to transfer to [facility]. Orthopedic recs:  Possible surgery but need orthopedics oncology   "

## 2024-08-20 ENCOUNTER — DOCUMENTATION ONLY (OUTPATIENT)
Dept: CARDIOLOGY | Facility: HOSPITAL | Age: 69
End: 2024-08-20
Payer: MEDICARE

## 2024-08-20 ENCOUNTER — ANESTHESIA (OUTPATIENT)
Dept: SURGERY | Facility: HOSPITAL | Age: 69
End: 2024-08-20
Payer: MEDICARE

## 2024-08-20 PROBLEM — E83.42 HYPOMAGNESEMIA: Status: ACTIVE | Noted: 2024-08-20

## 2024-08-20 PROBLEM — M84.453A: Status: ACTIVE | Noted: 2024-08-20

## 2024-08-20 LAB
ALBUMIN SERPL BCP-MCNC: 2.8 G/DL (ref 3.5–5.2)
ALP SERPL-CCNC: 62 U/L (ref 55–135)
ALT SERPL W/O P-5'-P-CCNC: 10 U/L (ref 10–44)
ANION GAP SERPL CALC-SCNC: 8 MMOL/L (ref 8–16)
AST SERPL-CCNC: 14 U/L (ref 10–40)
BASOPHILS # BLD AUTO: 0.04 K/UL (ref 0–0.2)
BASOPHILS # BLD AUTO: 0.05 K/UL (ref 0–0.2)
BASOPHILS NFR BLD: 0.5 % (ref 0–1.9)
BASOPHILS NFR BLD: 0.7 % (ref 0–1.9)
BILIRUB SERPL-MCNC: 0.3 MG/DL (ref 0.1–1)
BUN SERPL-MCNC: 26 MG/DL (ref 8–23)
CALCIUM SERPL-MCNC: 10.4 MG/DL (ref 8.7–10.5)
CHLORIDE SERPL-SCNC: 103 MMOL/L (ref 95–110)
CO2 SERPL-SCNC: 25 MMOL/L (ref 23–29)
CREAT SERPL-MCNC: 1.1 MG/DL (ref 0.5–1.4)
DIFFERENTIAL METHOD BLD: ABNORMAL
DIFFERENTIAL METHOD BLD: ABNORMAL
EOSINOPHIL # BLD AUTO: 0 K/UL (ref 0–0.5)
EOSINOPHIL # BLD AUTO: 0.1 K/UL (ref 0–0.5)
EOSINOPHIL NFR BLD: 0.3 % (ref 0–8)
EOSINOPHIL NFR BLD: 1.2 % (ref 0–8)
ERYTHROCYTE [DISTWIDTH] IN BLOOD BY AUTOMATED COUNT: 17.9 % (ref 11.5–14.5)
ERYTHROCYTE [DISTWIDTH] IN BLOOD BY AUTOMATED COUNT: 18.2 % (ref 11.5–14.5)
EST. GFR  (NO RACE VARIABLE): 55.4 ML/MIN/1.73 M^2
GLUCOSE SERPL-MCNC: 162 MG/DL (ref 70–110)
HCT VFR BLD AUTO: 31.5 % (ref 37–48.5)
HCT VFR BLD AUTO: 32.2 % (ref 37–48.5)
HGB BLD-MCNC: 9.2 G/DL (ref 12–16)
HGB BLD-MCNC: 9.4 G/DL (ref 12–16)
IMM GRANULOCYTES # BLD AUTO: 0.05 K/UL (ref 0–0.04)
IMM GRANULOCYTES # BLD AUTO: 0.14 K/UL (ref 0–0.04)
IMM GRANULOCYTES NFR BLD AUTO: 0.8 % (ref 0–0.5)
IMM GRANULOCYTES NFR BLD AUTO: 1.4 % (ref 0–0.5)
LYMPHOCYTES # BLD AUTO: 0.5 K/UL (ref 1–4.8)
LYMPHOCYTES # BLD AUTO: 0.7 K/UL (ref 1–4.8)
LYMPHOCYTES NFR BLD: 12 % (ref 18–48)
LYMPHOCYTES NFR BLD: 4.6 % (ref 18–48)
MAGNESIUM SERPL-MCNC: 1.5 MG/DL (ref 1.6–2.6)
MCH RBC QN AUTO: 23.2 PG (ref 27–31)
MCH RBC QN AUTO: 23.4 PG (ref 27–31)
MCHC RBC AUTO-ENTMCNC: 29.2 G/DL (ref 32–36)
MCHC RBC AUTO-ENTMCNC: 29.2 G/DL (ref 32–36)
MCV RBC AUTO: 80 FL (ref 82–98)
MCV RBC AUTO: 80 FL (ref 82–98)
MONOCYTES # BLD AUTO: 0.5 K/UL (ref 0.3–1)
MONOCYTES # BLD AUTO: 0.6 K/UL (ref 0.3–1)
MONOCYTES NFR BLD: 6.2 % (ref 4–15)
MONOCYTES NFR BLD: 7.5 % (ref 4–15)
NEUTROPHILS # BLD AUTO: 4.7 K/UL (ref 1.8–7.7)
NEUTROPHILS # BLD AUTO: 8.4 K/UL (ref 1.8–7.7)
NEUTROPHILS NFR BLD: 77.8 % (ref 38–73)
NEUTROPHILS NFR BLD: 87 % (ref 38–73)
NRBC BLD-RTO: 0 /100 WBC
NRBC BLD-RTO: 0 /100 WBC
PHOSPHATE SERPL-MCNC: 4 MG/DL (ref 2.7–4.5)
PLATELET # BLD AUTO: 255 K/UL (ref 150–450)
PLATELET # BLD AUTO: 262 K/UL (ref 150–450)
PMV BLD AUTO: 10.6 FL (ref 9.2–12.9)
PMV BLD AUTO: 11.6 FL (ref 9.2–12.9)
POCT GLUCOSE: 145 MG/DL (ref 70–110)
POCT GLUCOSE: 154 MG/DL (ref 70–110)
POCT GLUCOSE: 172 MG/DL (ref 70–110)
POTASSIUM SERPL-SCNC: 4.8 MMOL/L (ref 3.5–5.1)
PROT SERPL-MCNC: 6.9 G/DL (ref 6–8.4)
RBC # BLD AUTO: 3.96 M/UL (ref 4–5.4)
RBC # BLD AUTO: 4.02 M/UL (ref 4–5.4)
SODIUM SERPL-SCNC: 136 MMOL/L (ref 136–145)
WBC # BLD AUTO: 6.01 K/UL (ref 3.9–12.7)
WBC # BLD AUTO: 9.69 K/UL (ref 3.9–12.7)

## 2024-08-20 PROCEDURE — 27201423 OPTIME MED/SURG SUP & DEVICES STERILE SUPPLY: Mod: HCNC | Performed by: ORTHOPAEDIC SURGERY

## 2024-08-20 PROCEDURE — 63600175 PHARM REV CODE 636 W HCPCS: Mod: HCNC | Performed by: NURSE ANESTHETIST, CERTIFIED REGISTERED

## 2024-08-20 PROCEDURE — 84100 ASSAY OF PHOSPHORUS: CPT | Mod: HCNC

## 2024-08-20 PROCEDURE — 88342 IMHCHEM/IMCYTCHM 1ST ANTB: CPT | Mod: HCNC | Performed by: PATHOLOGY

## 2024-08-20 PROCEDURE — 64999 UNLISTED PX NERVOUS SYSTEM: CPT | Mod: LT,,, | Performed by: ANESTHESIOLOGY

## 2024-08-20 PROCEDURE — 27000221 HC OXYGEN, UP TO 24 HOURS: Mod: HCNC

## 2024-08-20 PROCEDURE — 36000711: Mod: HCNC | Performed by: ORTHOPAEDIC SURGERY

## 2024-08-20 PROCEDURE — C1713 ANCHOR/SCREW BN/BN,TIS/BN: HCPCS | Mod: HCNC | Performed by: ORTHOPAEDIC SURGERY

## 2024-08-20 PROCEDURE — 82962 GLUCOSE BLOOD TEST: CPT | Mod: HCNC | Performed by: ORTHOPAEDIC SURGERY

## 2024-08-20 PROCEDURE — 25000003 PHARM REV CODE 250: Mod: HCNC

## 2024-08-20 PROCEDURE — 71000015 HC POSTOP RECOV 1ST HR: Mod: HCNC | Performed by: ORTHOPAEDIC SURGERY

## 2024-08-20 PROCEDURE — 80053 COMPREHEN METABOLIC PANEL: CPT | Mod: HCNC

## 2024-08-20 PROCEDURE — 25000003 PHARM REV CODE 250: Mod: HCNC | Performed by: NURSE ANESTHETIST, CERTIFIED REGISTERED

## 2024-08-20 PROCEDURE — 88311 DECALCIFY TISSUE: CPT | Mod: HCNC | Performed by: PATHOLOGY

## 2024-08-20 PROCEDURE — 85025 COMPLETE CBC W/AUTO DIFF WBC: CPT | Mod: 91,HCNC | Performed by: HOSPITALIST

## 2024-08-20 PROCEDURE — 37000009 HC ANESTHESIA EA ADD 15 MINS: Mod: HCNC | Performed by: ORTHOPAEDIC SURGERY

## 2024-08-20 PROCEDURE — 64448 NJX AA&/STRD FEM NRV NFS IMG: CPT | Mod: HCNC

## 2024-08-20 PROCEDURE — 63600175 PHARM REV CODE 636 W HCPCS: Mod: HCNC | Performed by: HOSPITALIST

## 2024-08-20 PROCEDURE — 36000710: Mod: HCNC | Performed by: ORTHOPAEDIC SURGERY

## 2024-08-20 PROCEDURE — 63600175 PHARM REV CODE 636 W HCPCS: Mod: HCNC | Performed by: SURGERY

## 2024-08-20 PROCEDURE — 0QS704Z REPOSITION LEFT UPPER FEMUR WITH INTERNAL FIXATION DEVICE, OPEN APPROACH: ICD-10-PCS | Performed by: ORTHOPAEDIC SURGERY

## 2024-08-20 PROCEDURE — 85025 COMPLETE CBC W/AUTO DIFF WBC: CPT | Mod: HCNC

## 2024-08-20 PROCEDURE — 94761 N-INVAS EAR/PLS OXIMETRY MLT: CPT | Mod: HCNC

## 2024-08-20 PROCEDURE — 71000033 HC RECOVERY, INTIAL HOUR: Mod: HCNC | Performed by: ORTHOPAEDIC SURGERY

## 2024-08-20 PROCEDURE — 20240 BONE BIOPSY OPEN SUPERFICIAL: CPT | Mod: 51,HCNC,, | Performed by: ORTHOPAEDIC SURGERY

## 2024-08-20 PROCEDURE — 96376 TX/PRO/DX INJ SAME DRUG ADON: CPT

## 2024-08-20 PROCEDURE — 27245 TREAT THIGH FRACTURE: CPT | Mod: HCNC,LT,, | Performed by: ORTHOPAEDIC SURGERY

## 2024-08-20 PROCEDURE — 83735 ASSAY OF MAGNESIUM: CPT | Mod: HCNC

## 2024-08-20 PROCEDURE — 63600175 PHARM REV CODE 636 W HCPCS: Mod: HCNC | Performed by: ANESTHESIOLOGY

## 2024-08-20 PROCEDURE — 25000003 PHARM REV CODE 250: Mod: HCNC | Performed by: HOSPITALIST

## 2024-08-20 PROCEDURE — C1769 GUIDE WIRE: HCPCS | Mod: HCNC | Performed by: ORTHOPAEDIC SURGERY

## 2024-08-20 PROCEDURE — 88305 TISSUE EXAM BY PATHOLOGIST: CPT | Mod: 59,HCNC | Performed by: PATHOLOGY

## 2024-08-20 PROCEDURE — 21400001 HC TELEMETRY ROOM: Mod: HCNC

## 2024-08-20 PROCEDURE — 88341 IMHCHEM/IMCYTCHM EA ADD ANTB: CPT | Mod: HCNC | Performed by: PATHOLOGY

## 2024-08-20 PROCEDURE — 63600175 PHARM REV CODE 636 W HCPCS: Mod: HCNC

## 2024-08-20 PROCEDURE — 0QB70ZX EXCISION OF LEFT UPPER FEMUR, OPEN APPROACH, DIAGNOSTIC: ICD-10-PCS | Performed by: ORTHOPAEDIC SURGERY

## 2024-08-20 PROCEDURE — 99223 1ST HOSP IP/OBS HIGH 75: CPT | Mod: HCNC,,, | Performed by: ORTHOPAEDIC SURGERY

## 2024-08-20 PROCEDURE — 37000008 HC ANESTHESIA 1ST 15 MINUTES: Mod: HCNC | Performed by: ORTHOPAEDIC SURGERY

## 2024-08-20 PROCEDURE — 36415 COLL VENOUS BLD VENIPUNCTURE: CPT | Mod: HCNC

## 2024-08-20 DEVICE — SCREW XL25 IM NAIL 36X5MM: Type: IMPLANTABLE DEVICE | Site: FEMUR | Status: FUNCTIONAL

## 2024-08-20 DEVICE — IMPLANTABLE DEVICE: Type: IMPLANTABLE DEVICE | Site: FEMUR | Status: FUNCTIONAL

## 2024-08-20 DEVICE — CEMENT TRAUMACEM V+ STRL 10ML: Type: IMPLANTABLE DEVICE | Site: FEMUR | Status: FUNCTIONAL

## 2024-08-20 DEVICE — SCREW FEM NECK PERF GOLD 85MM: Type: IMPLANTABLE DEVICE | Site: FEMUR | Status: FUNCTIONAL

## 2024-08-20 DEVICE — SCREW LOK XL25 5X48MM: Type: IMPLANTABLE DEVICE | Site: FEMUR | Status: FUNCTIONAL

## 2024-08-20 RX ORDER — MIDAZOLAM HYDROCHLORIDE 1 MG/ML
INJECTION, SOLUTION INTRAMUSCULAR; INTRAVENOUS
Status: COMPLETED
Start: 2024-08-20 | End: 2024-08-20

## 2024-08-20 RX ORDER — FENTANYL CITRATE 50 UG/ML
25-200 INJECTION, SOLUTION INTRAMUSCULAR; INTRAVENOUS
Status: DISCONTINUED | OUTPATIENT
Start: 2024-08-20 | End: 2024-08-20 | Stop reason: HOSPADM

## 2024-08-20 RX ORDER — OXYCODONE HYDROCHLORIDE 5 MG/1
5 TABLET ORAL EVERY 4 HOURS PRN
Status: DISCONTINUED | OUTPATIENT
Start: 2024-08-20 | End: 2024-08-23 | Stop reason: HOSPADM

## 2024-08-20 RX ORDER — OXYCODONE HYDROCHLORIDE 10 MG/1
10 TABLET ORAL EVERY 4 HOURS PRN
Status: DISCONTINUED | OUTPATIENT
Start: 2024-08-20 | End: 2024-08-23 | Stop reason: HOSPADM

## 2024-08-20 RX ORDER — AMOXICILLIN 250 MG
1 CAPSULE ORAL DAILY
Status: DISCONTINUED | OUTPATIENT
Start: 2024-08-20 | End: 2024-08-23 | Stop reason: HOSPADM

## 2024-08-20 RX ORDER — FENTANYL CITRATE 50 UG/ML
25 INJECTION, SOLUTION INTRAMUSCULAR; INTRAVENOUS EVERY 5 MIN PRN
Status: DISCONTINUED | OUTPATIENT
Start: 2024-08-20 | End: 2024-08-20 | Stop reason: HOSPADM

## 2024-08-20 RX ORDER — MUPIROCIN 20 MG/G
OINTMENT TOPICAL
Status: DISCONTINUED | OUTPATIENT
Start: 2024-08-20 | End: 2024-08-20 | Stop reason: HOSPADM

## 2024-08-20 RX ORDER — MUPIROCIN 20 MG/G
1 OINTMENT TOPICAL 2 TIMES DAILY
Status: DISCONTINUED | OUTPATIENT
Start: 2024-08-20 | End: 2024-08-22

## 2024-08-20 RX ORDER — MUPIROCIN 20 MG/G
1 OINTMENT TOPICAL
Status: DISCONTINUED | OUTPATIENT
Start: 2024-08-20 | End: 2024-08-20 | Stop reason: HOSPADM

## 2024-08-20 RX ORDER — GLUCAGON 1 MG
1 KIT INJECTION
Status: DISCONTINUED | OUTPATIENT
Start: 2024-08-20 | End: 2024-08-20 | Stop reason: HOSPADM

## 2024-08-20 RX ORDER — HYDROMORPHONE HYDROCHLORIDE 1 MG/ML
0.2 INJECTION, SOLUTION INTRAMUSCULAR; INTRAVENOUS; SUBCUTANEOUS EVERY 5 MIN PRN
Status: DISCONTINUED | OUTPATIENT
Start: 2024-08-20 | End: 2024-08-20 | Stop reason: HOSPADM

## 2024-08-20 RX ORDER — MIDAZOLAM HYDROCHLORIDE 1 MG/ML
.5-4 INJECTION, SOLUTION INTRAMUSCULAR; INTRAVENOUS
Status: DISCONTINUED | OUTPATIENT
Start: 2024-08-20 | End: 2024-08-20 | Stop reason: HOSPADM

## 2024-08-20 RX ORDER — MAGNESIUM SULFATE HEPTAHYDRATE 40 MG/ML
2 INJECTION, SOLUTION INTRAVENOUS ONCE
Status: COMPLETED | OUTPATIENT
Start: 2024-08-20 | End: 2024-08-20

## 2024-08-20 RX ORDER — PHENYLEPHRINE HYDROCHLORIDE 10 MG/ML
INJECTION INTRAVENOUS
Status: DISCONTINUED | OUTPATIENT
Start: 2024-08-20 | End: 2024-08-20

## 2024-08-20 RX ORDER — ROPIVACAINE HYDROCHLORIDE 5 MG/ML
INJECTION, SOLUTION EPIDURAL; INFILTRATION; PERINEURAL
Status: COMPLETED | OUTPATIENT
Start: 2024-08-20 | End: 2024-08-20

## 2024-08-20 RX ORDER — METHOCARBAMOL 500 MG/1
500 TABLET, FILM COATED ORAL EVERY 6 HOURS
Status: DISCONTINUED | OUTPATIENT
Start: 2024-08-20 | End: 2024-08-21

## 2024-08-20 RX ORDER — HALOPERIDOL 5 MG/ML
0.5 INJECTION INTRAMUSCULAR EVERY 10 MIN PRN
Status: DISCONTINUED | OUTPATIENT
Start: 2024-08-20 | End: 2024-08-20 | Stop reason: HOSPADM

## 2024-08-20 RX ORDER — ACETAMINOPHEN 500 MG
1000 TABLET ORAL EVERY 6 HOURS
Status: DISCONTINUED | OUTPATIENT
Start: 2024-08-20 | End: 2024-08-20

## 2024-08-20 RX ORDER — AMOXICILLIN 250 MG
1 CAPSULE ORAL 2 TIMES DAILY
Status: DISCONTINUED | OUTPATIENT
Start: 2024-08-20 | End: 2024-08-20

## 2024-08-20 RX ORDER — ONDANSETRON HYDROCHLORIDE 2 MG/ML
INJECTION, SOLUTION INTRAVENOUS
Status: DISCONTINUED | OUTPATIENT
Start: 2024-08-20 | End: 2024-08-20

## 2024-08-20 RX ORDER — FENTANYL CITRATE 50 UG/ML
INJECTION, SOLUTION INTRAMUSCULAR; INTRAVENOUS
Status: COMPLETED
Start: 2024-08-20 | End: 2024-08-20

## 2024-08-20 RX ORDER — CEFAZOLIN 2 G/1
INJECTION, POWDER, FOR SOLUTION INTRAMUSCULAR; INTRAVENOUS
Status: DISCONTINUED | OUTPATIENT
Start: 2024-08-20 | End: 2024-08-20

## 2024-08-20 RX ORDER — ROCURONIUM BROMIDE 10 MG/ML
INJECTION, SOLUTION INTRAVENOUS
Status: DISCONTINUED | OUTPATIENT
Start: 2024-08-20 | End: 2024-08-20

## 2024-08-20 RX ORDER — TRANEXAMIC ACID 100 MG/ML
INJECTION, SOLUTION INTRAVENOUS
Status: DISCONTINUED | OUTPATIENT
Start: 2024-08-20 | End: 2024-08-20

## 2024-08-20 RX ORDER — LIDOCAINE HYDROCHLORIDE 20 MG/ML
INJECTION, SOLUTION EPIDURAL; INFILTRATION; INTRACAUDAL; PERINEURAL
Status: DISCONTINUED | OUTPATIENT
Start: 2024-08-20 | End: 2024-08-20

## 2024-08-20 RX ORDER — MORPHINE SULFATE 2 MG/ML
2 INJECTION, SOLUTION INTRAMUSCULAR; INTRAVENOUS
Status: DISCONTINUED | OUTPATIENT
Start: 2024-08-20 | End: 2024-08-23 | Stop reason: HOSPADM

## 2024-08-20 RX ORDER — LIDOCAINE HYDROCHLORIDE 10 MG/ML
1 INJECTION, SOLUTION EPIDURAL; INFILTRATION; INTRACAUDAL; PERINEURAL
Status: DISCONTINUED | OUTPATIENT
Start: 2024-08-20 | End: 2024-08-20 | Stop reason: HOSPADM

## 2024-08-20 RX ORDER — PROPOFOL 10 MG/ML
VIAL (ML) INTRAVENOUS
Status: DISCONTINUED | OUTPATIENT
Start: 2024-08-20 | End: 2024-08-20

## 2024-08-20 RX ORDER — SODIUM CHLORIDE 0.9 % (FLUSH) 0.9 %
10 SYRINGE (ML) INJECTION
Status: DISCONTINUED | OUTPATIENT
Start: 2024-08-20 | End: 2024-08-20 | Stop reason: HOSPADM

## 2024-08-20 RX ORDER — ROPIVACAINE HYDROCHLORIDE 2 MG/ML
INJECTION, SOLUTION EPIDURAL; INFILTRATION; PERINEURAL CONTINUOUS
Status: DISCONTINUED | OUTPATIENT
Start: 2024-08-20 | End: 2024-08-20

## 2024-08-20 RX ORDER — POLYETHYLENE GLYCOL 3350 17 G/17G
17 POWDER, FOR SOLUTION ORAL DAILY
Status: DISCONTINUED | OUTPATIENT
Start: 2024-08-20 | End: 2024-08-23 | Stop reason: HOSPADM

## 2024-08-20 RX ORDER — POLYETHYLENE GLYCOL 3350 17 G/17G
17 POWDER, FOR SOLUTION ORAL DAILY
Status: DISCONTINUED | OUTPATIENT
Start: 2024-08-20 | End: 2024-08-20

## 2024-08-20 RX ADMIN — LIDOCAINE HYDROCHLORIDE 100 MG: 20 INJECTION, SOLUTION EPIDURAL; INFILTRATION; INTRACAUDAL; PERINEURAL at 11:08

## 2024-08-20 RX ADMIN — MIDAZOLAM 1 MG: 1 INJECTION INTRAMUSCULAR; INTRAVENOUS at 11:08

## 2024-08-20 RX ADMIN — PHENYLEPHRINE HYDROCHLORIDE 200 MCG: 10 INJECTION INTRAVENOUS at 01:08

## 2024-08-20 RX ADMIN — MAGNESIUM SULFATE HEPTAHYDRATE 2 G: 40 INJECTION, SOLUTION INTRAVENOUS at 07:08

## 2024-08-20 RX ADMIN — TRANEXAMIC ACID 1000 MG: 100 INJECTION INTRAVENOUS at 12:08

## 2024-08-20 RX ADMIN — PROPOFOL 140 MG: 10 INJECTION, EMULSION INTRAVENOUS at 11:08

## 2024-08-20 RX ADMIN — FENTANYL CITRATE 50 MCG: 50 INJECTION, SOLUTION INTRAMUSCULAR; INTRAVENOUS at 11:08

## 2024-08-20 RX ADMIN — TRANEXAMIC ACID 1000 MG: 100 INJECTION INTRAVENOUS at 02:08

## 2024-08-20 RX ADMIN — OXYCODONE 10 MG: 5 TABLET ORAL at 04:08

## 2024-08-20 RX ADMIN — OXYCODONE 10 MG: 5 TABLET ORAL at 06:08

## 2024-08-20 RX ADMIN — SODIUM CHLORIDE: 0.9 INJECTION, SOLUTION INTRAVENOUS at 11:08

## 2024-08-20 RX ADMIN — METHOCARBAMOL 500 MG: 500 TABLET ORAL at 05:08

## 2024-08-20 RX ADMIN — PHENYLEPHRINE HYDROCHLORIDE 100 MCG: 10 INJECTION INTRAVENOUS at 02:08

## 2024-08-20 RX ADMIN — GABAPENTIN 300 MG: 300 CAPSULE ORAL at 04:08

## 2024-08-20 RX ADMIN — SUGAMMADEX 200 MG: 100 INJECTION, SOLUTION INTRAVENOUS at 02:08

## 2024-08-20 RX ADMIN — SODIUM CHLORIDE: 9 INJECTION, SOLUTION INTRAVENOUS at 02:08

## 2024-08-20 RX ADMIN — ACETAMINOPHEN 1000 MG: 500 TABLET ORAL at 05:08

## 2024-08-20 RX ADMIN — CEFAZOLIN 2 G: 2 INJECTION, POWDER, FOR SOLUTION INTRAMUSCULAR; INTRAVENOUS at 08:08

## 2024-08-20 RX ADMIN — MUPIROCIN: 20 OINTMENT TOPICAL at 10:08

## 2024-08-20 RX ADMIN — ONDANSETRON 4 MG: 2 INJECTION INTRAMUSCULAR; INTRAVENOUS at 02:08

## 2024-08-20 RX ADMIN — MORPHINE SULFATE 2 MG: 2 INJECTION, SOLUTION INTRAMUSCULAR; INTRAVENOUS at 05:08

## 2024-08-20 RX ADMIN — PHENYLEPHRINE HYDROCHLORIDE 100 MCG: 10 INJECTION INTRAVENOUS at 12:08

## 2024-08-20 RX ADMIN — SODIUM CHLORIDE: 0.9 INJECTION, SOLUTION INTRAVENOUS at 02:08

## 2024-08-20 RX ADMIN — PHENYLEPHRINE HYDROCHLORIDE 100 MCG: 10 INJECTION INTRAVENOUS at 01:08

## 2024-08-20 RX ADMIN — GABAPENTIN 300 MG: 300 CAPSULE ORAL at 09:08

## 2024-08-20 RX ADMIN — CEFAZOLIN 2 G: 2 INJECTION, POWDER, FOR SOLUTION INTRAMUSCULAR; INTRAVENOUS at 12:08

## 2024-08-20 RX ADMIN — PHENYLEPHRINE HYDROCHLORIDE 200 MCG: 10 INJECTION INTRAVENOUS at 12:08

## 2024-08-20 RX ADMIN — MIDAZOLAM HYDROCHLORIDE 1 MG: 1 INJECTION, SOLUTION INTRAMUSCULAR; INTRAVENOUS at 11:08

## 2024-08-20 RX ADMIN — HYDROMORPHONE HYDROCHLORIDE 0.2 MG: 1 INJECTION, SOLUTION INTRAMUSCULAR; INTRAVENOUS; SUBCUTANEOUS at 04:08

## 2024-08-20 RX ADMIN — ROCURONIUM BROMIDE 50 MG: 10 INJECTION, SOLUTION INTRAVENOUS at 11:08

## 2024-08-20 RX ADMIN — MUPIROCIN 1 G: 20 OINTMENT TOPICAL at 09:08

## 2024-08-20 RX ADMIN — ROPIVACAINE HYDROCHLORIDE 15 ML: 5 INJECTION EPIDURAL; INFILTRATION; PERINEURAL at 11:08

## 2024-08-20 NOTE — PROGRESS NOTES
Patient has been identified as having an implanted cardiac rhythm device (CRD); the implanted device is a [employer]tr[employer] defibrillator.      Planned procedure:  INSERTION, INTRAMEDULLARY ANTOINE, FEMUR + bone biopsy - Callie table, supine, Synthes TFN blade + cement. Pituitary roungeur for biopsy.     No noted pacer dependency.       DEFIBRILLATORS/NON-DEPENDENT ANY LOCATION    Per protocol,a magnet should be applied directly over the implanted device during entire surgical procedure when electrocautery will be used.    If no electrocautery is planned, magnet application is not needed.    For additional questions, please contact the Arrhythmia Department at Ext [phone].

## 2024-08-20 NOTE — ASSESSMENT & PLAN NOTE
Last A1c reviewed-   Lab Results   Component Value Date    HGBA1C 6.1 (H) 08/19/2024     Current correctional scale  Medium  Maintain anti-hyperglycemic dose as follows-   Antihyperglycemics (From admission, onward)      Start     Stop Route Frequency Ordered    08/19/24 0526  insulin aspart U-100 pen 0-10 Units         -- SubQ Every 6 hours PRN 08/19/24 0429          Hold Oral hypoglycemics while patient is in the hospital.  At goal in 100s

## 2024-08-20 NOTE — CARE UPDATE
Notified by PACU nurse about missing L SIFI PNC. Assessed at bedside. Insertion site seen without PNC. Notified Ortho team of missing catheter. Unsure if inadvertent removal with removal of drapes. Ropi orders discontinued. Modified pain regiment.     Michele Jurado MD  Department of Anesthesiology  [facility]  08/20/2024 4:18 PM

## 2024-08-20 NOTE — NURSING TRANSFER
Nursing Transfer Note      8/20/2024   4:51 PM    Nurse giving handoff:JUAN DIEGO Ospina RN   Nurse receiving handoff:STANLEY Daely RN     Reason patient is being transferred: post procedure     Transfer To: [id number]    Transfer via bed    Transfer with cardiac monitoring    Transported by PCT and transport     Additional Lines: Carrillo Catheter    Any special needs or follow-up needed: routine     Chart send with patient: Yes    Notified: son    Patient reassessed at: 1615 on 8/20

## 2024-08-20 NOTE — PROGRESS NOTES
[facility] ([address] [place] [address] Medicine  Progress Note    Patient Name: Hannah Montoya  MRN: [id number]  Patient Class: IP- Inpatient   Admission Date: 8/18/2024  Length of Stay: 0 days  Attending Physician: Penelope Joseph MD  Primary Care Provider: Emanuel Chavez MD        Subjective:     Principal Problem:Left hip pain        HPI:  Ms. Montoya is a 68 yr old female with a hx of HTN, dm type 2, left bundle branch block, CRT-D placement, breast cancer, NSCLC of RLL, obesity, depression, dilated cardiomyopathy, nonischemic cardiomyopathy, COPD, HLD who presents to the ED with a chief complaint of 10/10 constant sharp left hip pain that radiates to her left knee. Patient endorses that left hip has been bothering her over the last month.  She states that she had been using a cane to get around and the pain progressively worsened.  She was seen by her oncologist who recommended gabapentin 300 mg TID.  She also endorses that due to the left knee swelling and left hip pain she had to began using a walker to get around at home.  She states that she was seen by a bone Dr. who prescribed meloxicam and gave her a steroid shot in her knee.  About 1 week prior to today she was bringing in groceries and fell on her left side.  She endorses she was walking to the restroom with her cane yesterday and she heard a loud pop and began having excruciating pain and was unable to bear weight or ambulate with her left leg afterwards.  She endorses movement exacerbates her pain.  She states after treatment in the ED her pain is now 4-5/10.  She states that she quit smoking in 2016, rarely drinks alcohol, and denies recreational drug use.  She denies fever, chills, SOB, CP, nausea, vomiting, diarrhea, dysuria, hematuria, lightheadedness, dizziness, and syncope.  Of note, she sees Dr. Avina for Oncology and Dr. Lara for radiation.    Upon arrival to ED, patient afebrile, HR of 92, RR of 19, BP of 146/82, satting  "99% on RA.  Workup in the ED included CBC, CMP, left hip x-ray, CT left hip.  Workup revealed H/H of 9.5/32.4, calcium of 2.7.  Left hip x-ray showed AP pelvis and two views of the left hip demonstrate an oblique lucency in the left intertrochanteric region.  Finding may represent artifact versus nondisplaced fracture.  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  There is no hip dislocation.  There is no displaced fracture.  Degenerative changes are seen in the visualized lower lumbar spine."Patient was given morphine 4 mg IV, ondansetron 4 mg IV, and orphenadrine 60 mg IM while in the ED.  Case discussed with ED provider and patient will be placed under observation for further management.    Overview/Hospital Course:  No notes on file    Interval history- transferred last night from [facility] for ortho. Plan for nail to hip today with ortho and saw with family at bedside before going to OR today for this. She reports dr raza had gotten a CT that was showing possible area of concern in hip and then had the popping sound/more pain and then CT at [facility] showed the lesion that had impending fracture so she was aware before that admit that there was lkely something cancerous there that was new. She was planning for infusio with dr raza upcoming in next week as well as dr riley with rad onc appt Friday. Her mag is low at 1.4 and replaced. Glucose controlled in 100s. Plan for eliquis post op, end date sept 25th.   I told her id talk to dr raza to see his thoughts on plans and then let her know. He reports that she on immunotherapy so wouldn't have wound healing issues with that so could start immediately on discharge. He reports that in [place] it is against the law for rehabs to deny cancer treatment when at post acute facilities but have trouble anyways getting them to " allow treatment, infusions while there so may have to hold/delay if at post acute care. I told him id touch base with him tomorrow pendign PT/OT santos to see his thoughts again then that I could relay to her regarding treatment plan post hospital stay and follow ups with him.    Review of patient's allergies indicates:   Allergen Reactions    Taxol [paclitaxel]      Hypersensitivity reaction to taxol, symptoms included shortness of breath, nausea, dizziness, flushing     Carboplatin Other (See Comments)     Itching and hives    Adhesive Rash     tegaderm burns and blistered skin       Current Facility-Administered Medications on File Prior to Encounter   Medication    fentaNYL injection 25 mcg    haloperidol lactate injection 0.5 mg    HYDROmorphone injection 0.2 mg    ondansetron injection 4 mg    sodium chloride 0.9% flush 10 mL     Current Outpatient Medications on File Prior to Encounter   Medication Sig    metoprolol succinate (TOPROL-XL) 100 MG 24 hr tablet TAKE 1 TABLET BY MOUTH EVERY DAY    ACCU-CHEK ALFRED PLUS TEST STRP Strp USE TO TEST THREE TIMES DAILY    albuterol (ACCUNEB) 1.25 mg/3 mL Nebu use 1 AMPULE (3ml) via NEBULIZER EVERY 6 HOURS AS NEEDED    albuterol (VENTOLIN HFA) 90 mcg/actuation inhaler Inhale 2 puffs into the lungs every 4 (four) hours as needed for Wheezing or Shortness of Breath. Rescue    amLODIPine (NORVASC) 5 MG tablet TAKE 1 TABLET BY MOUTH EVERY DAY    atorvastatin (LIPITOR) 10 MG tablet TAKE 1 TABLET BY MOUTH EVERY DAY    benzonatate (TESSALON) 200 MG capsule Take 1 capsule (200 mg total) by mouth 3 (three) times daily as needed for Cough.    betamethasone dipropionate 0.05 % cream Apply topically 2 (two) times daily as needed (rash). (Patient not taking: Reported on 5/2/2024)    buPROPion (WELLBUTRIN XL) 150 MG TB24 tablet TAKE 1 TABLET BY MOUTH EVERY DAY    cetirizine (ZYRTEC) 10 MG tablet Take 10 mg by mouth every evening.     FLUAD QUAD 2023-24,65Y UP,,PF, 60 mcg (15 mcg x  4)/0.5 mL Syrg     gabapentin (NEURONTIN) 300 MG capsule Take 1 capsule (300 mg total) by mouth 3 (three) times daily.    losartan (COZAAR) 100 MG tablet TAKE 1 TABLET BY MOUTH EVERY DAY    meloxicam (MOBIC) 15 MG tablet Take 1 tablet (15 mg total) by mouth once daily.    metFORMIN (GLUCOPHAGE) 500 MG tablet TAKE 2 TABLETS BY MOUTH TWICE A DAY WITH MEALS    multivitamin (THERAGRAN) per tablet Take 1 tablet by mouth once daily.    mupirocin (BACTROBAN) 2 % ointment Apply topically 3 (three) times daily. (Patient not taking: Reported on 5/2/2024)    OMNIPAQUE 300 300 mg iodine/mL injection  (Patient not taking: Reported on 5/2/2024)    OMNIPAQUE 350 350 mg iodine/mL Soln injection  (Patient not taking: Reported on 5/2/2024)    ondansetron (ZOFRAN-ODT) 8 MG TbDL Take 1 tablet (8 mg total) by mouth every 8 (eight) hours as needed (nausea/vomiting). Take 1 tablet (8 mg) by mouth every 8 hours as needed for nausea/vomiting.    OPDIVO 120 mg/12 mL Soln  (Patient not taking: Reported on 5/2/2024)    OPDIVO 240 mg/24 mL Soln  (Patient not taking: Reported on 5/2/2024)    OPDIVO 40 mg/4 mL injection  (Patient not taking: Reported on 5/2/2024)    palonosetron (ALOXI) 0.25 mg/5 mL injection     pantoprazole (PROTONIX) 40 MG tablet TAKE 1 TABLET BY MOUTH EVERY DAY    READI-CAT 2 2 % (w/v) suspension     READI-CAT 2 2.1 % (w/v), 2.0 % (w/w) suspension     semaglutide (OZEMPIC) 0.25 mg or 0.5 mg(2 mg/1.5 mL) pen injector Inject 0.5 mg into the skin every 7 days. (Patient not taking: Reported on 5/2/2024)    sertraline (ZOLOFT) 100 MG tablet TAKE 1 TABLET BY MOUTH EVERY DAY IN THE EVENING    tiotropium (SPIRIVA WITH HANDIHALER) 18 mcg inhalation capsule INHALE THE CONTENTS OF 1 CAPSULE BY MOUTH EVERY DAY    triamcinolone acetonide 0.1% (KENALOG) 0.1 % cream APPLY TOPICALLY TWICE A DAY (Patient not taking: Reported on 5/2/2024)    WIXELA INHUB 250-50 mcg/dose diskus inhaler INHALE 1 PUFF INTO THE LUNGS 2 (TWO) TIMES DAILY.  CONTROLLER    YERVOY 50 mg/10 mL (5 mg/mL)  (Patient not taking: Reported on 2024)     Family History       Problem Relation (Age of Onset)    Cataracts Mother, Father    Clotting disorder Brother    Kidney disease Mother, Father    Macular degeneration Maternal Grandmother    No Known Problems Daughter, Son, Sister, Maternal Aunt, Maternal Uncle, Paternal Aunt, Paternal Uncle, Maternal Grandfather, Paternal Grandmother, Paternal Grandfather          Tobacco Use    Smoking status: Former     Current packs/day: 0.00     Average packs/day: 0.5 packs/day for 40.0 years (20.0 ttl pk-yrs)     Types: Cigarettes     Start date: 1976     Quit date: 2016     Years since quittin.0     Passive exposure: Past    Smokeless tobacco: Never   Substance and Sexual Activity    Alcohol use: Yes     Alcohol/week: 1.0 standard drink of alcohol     Types: 1 Glasses of wine per week     Comment: rare- holiday    Drug use: No    Sexual activity: Not Currently     Partners: Male     Comment:      Review of Systems   Constitutional:  Negative for chills and fever.   Respiratory:  Negative for shortness of breath.    Cardiovascular:  Negative for chest pain.   Gastrointestinal:  Negative for abdominal pain, diarrhea, nausea and vomiting.   Genitourinary:  Negative for dysuria and hematuria.   Musculoskeletal:  Positive for arthralgias (left hip and left knee) and joint swelling (left knee).   Neurological:  Negative for dizziness, syncope and light-headedness.     Objective:     Vital Signs (Most Recent):  Temp: 97 °F (36.1 °C) (24 1028)  Pulse: 70 (24 1140)  Resp: 16 (24 1140)  BP: (!) 111/57 (24 1140)  SpO2: 100 % (24 1140) Vital Signs (24h Range):  Temp:  [97 °F (36.1 °C)-98.2 °F (36.8 °C)] 97 °F (36.1 °C)  Pulse:  [70-84] 70  Resp:  [12-20] 16  SpO2:  [91 %-100 %] 100 %  BP: (111-145)/(56-70) 111/57     Weight: 92.8 kg (204 lb 9.4 oz)  Body mass index is 37.42 kg/m².     Physical  Exam  Vitals reviewed.   Constitutional:       General: She is awake. She is not in acute distress.     Appearance: Normal appearance. She is not ill-appearing or diaphoretic.   Cardiovascular:      Rate and Rhythm: Normal rate.      Heart sounds: Normal heart sounds. No murmur heard.     No friction rub. No gallop.      Comments: Trace BLE edema  Pulmonary:      Effort: Pulmonary effort is normal. No accessory muscle usage or respiratory distress.      Breath sounds: Normal breath sounds. No wheezing, rhonchi or rales.   Abdominal:      General: Bowel sounds are normal.      Palpations: Abdomen is soft.      Tenderness: There is no abdominal tenderness. There is no guarding or rebound.   Musculoskeletal:      Comments: No obvious deformities noted to left hip.  No overlying skin changes or ecchymosis.  No tenderness to palpation.  Range of motion limited due to pain.    Slight swelling appreciated to left knee.  Small area of ecchymosis noted.  No tenderness to palpation.   Skin:     General: Skin is warm and dry.   Neurological:      Mental Status: She is alert and oriented to person, place, and time.   Psychiatric:         Behavior: Behavior is cooperative.                Significant Labs: All pertinent labs within the past 24 hours have been reviewed.  CBC:   Recent Labs   Lab 08/19/24  0436 08/20/24  0240   WBC 5.98 6.01   HGB 9.5* 9.2*   HCT 32.4* 31.5*    262     CMP:   Recent Labs   Lab 08/19/24  0436 08/20/24  0240    136   K 4.2 4.8    103   CO2 26 25   * 162*   BUN 20 26*   CREATININE 1.0 1.1   CALCIUM 10.7* 10.4   PROT 7.3 6.9   ALBUMIN 3.1* 2.8*   BILITOT 0.2 0.3   ALKPHOS 63 62   AST 17 14   ALT 10 10   ANIONGAP 12 8       Significant Imaging:   Imaging Results               CT Hip Without Contrast Left (Final result)  Result time 08/19/24 01:06:29      Final result by Marlene Herrera MD (08/19/24 01:06:29)                   Impression:      As above described.    This  report was flagged in Epic as abnormal.      Electronically signed by: Marlene Herrera  Date:    08/19/2024  Time:    01:06               Narrative:    EXAMINATION:  CT ABDOMEN PELVIS WITHOUT    CLINICAL HISTORY:  Left hip pain.    TECHNIQUE:  1.25 mm unenhanced axial images from the lung bases through the greater trochanters were performed.  Coronal and sagittal reformatted images were provided.    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  There is no hip dislocation.  There is no displaced fracture.  Degenerative changes are seen in the visualized lower lumbar spine.                                       X-Ray Hip 2 or 3 views Left with Pelvis when performed (Final result)  Result time 08/18/24 22:51:12      Final result by Marlene Herrera MD (08/18/24 22:51:12)                   Impression:      As above described.      Electronically signed by: Marlene Herrera  Date:    08/18/2024  Time:    22:51               Narrative:    EXAMINATION:  XR HIP WITH PELVIS WHEN PERFORMED 2 OR THREE VIEWS LEFT    CLINICAL HISTORY:  Pain in left hip    TECHNIQUE:  AP and lateral view of the left hip    COMPARISON:  None.    FINDINGS:  AP pelvis and two views of the left hip demonstrate an oblique lucency in the left intertrochanteric region.  Finding may represent artifact versus nondisplaced fracture.  There is mild degenerative changes at the hip joints.                                       Assessment/Plan:      * Left hip pain  Impending fx from met seen on imaging, transfer here, ortho to OR 8/20 for IM nail  -talked to dr raza with heme onc, aware of admit, plan for immunotherapy as outpatient for treatment  -eliquis post op, end date sept 25  -pt/ot pod 1  -multimodal pain meds, bowel regimen  -hg 9.5 pre op  Carrillo out POD 1    Hypomagnesemia  Patient has Abnormal Magnesium:  hypomagnesemia. Will continue to monitor electrolytes closely. Will replace the affected electrolytes and repeat labs to be done after interventions completed. The patient's magnesium results have been reviewed and are listed below.  Recent Labs   Lab 08/20/24  0240   MG 1.5*        Moderate episode of recurrent major depressive disorder  - Continue home zoloft and wellbutrin        Class 2 severe obesity due to excess calories with serious comorbidity in adult  Body mass index is 38.41 kg/m². Morbid obesity complicates all aspects of disease management from diagnostic modalities to treatment.      NSCLC of right lung  - sees dr raza with heme/onc, dx 2021, has been on multiple regmiens per notes, RLL, hilar adenopathy known. Had more hip pain recently and had scans showing likely progression with this lesion that now is showing impending fx that she is getting fixed with 8/20 today  Messaged dr raza 8/20 about surgery today, plan for immunotherapy that doesn't need to hold as doesn't cause wound healing issues per him, but may have to hold if at post acute care      History of breast cancer in female  - Hx noted  2016      Cardiac resynchronization therapy defibrillator (CRT-D) in place  - Hx noted      Left bundle-branch block  - Hx noted      Type 2 diabetes mellitus without complication  Last A1c reviewed-   Lab Results   Component Value Date    HGBA1C 6.1 (H) 08/19/2024     Current correctional scale  Medium  Maintain anti-hyperglycemic dose as follows-   Antihyperglycemics (From admission, onward)      Start     Stop Route Frequency Ordered    08/19/24 0526  insulin aspart U-100 pen 0-10 Units         -- SubQ Every 6 hours PRN 08/19/24 0429          Hold Oral hypoglycemics while patient is in the hospital.  At goal in 100s    Essential hypertension  Chronic, Latest blood pressure and vitals reviewed-     Temp:  [98.1 °F (36.7 °C)]   Pulse:  [77-92]   Resp:  [18-19]   BP: (138-178)/(72-99)   SpO2:  [93  %-99 %] .   Home meds for hypertension were reviewed and noted below.   Hypertension Medications               amLODIPine (NORVASC) 5 MG tablet TAKE 1 TABLET BY MOUTH EVERY DAY    losartan (COZAAR) 100 MG tablet TAKE 1 TABLET BY MOUTH EVERY DAY    metoprolol succinate (TOPROL-XL) 100 MG 24 hr tablet TAKE 1 TABLET BY MOUTH EVERY DAY            While in the hospital, will manage blood pressure as follows; Continue home antihypertensive regimen    Will utilize p.r.n. blood pressure medication only if patient's blood pressure greater than 180/110 and she develops symptoms such as worsening chest pain or shortness of breath.      VTE Risk Mitigation (From admission, onward)           Ordered     IP VTE HIGH RISK PATIENT  Once         08/20/24 1006     Place sequential compression device  Until discontinued         08/20/24 1006     Place sequential compression device  Until discontinued         08/19/24 0429                    Discharge Planning   GUERRERO: 8/23/2024     Code Status: Full Code   Is the patient medically ready for discharge?:     Reason for patient still in hospital (select all that apply): Patient trending condition  Discharge Plan A: Home (with instructions to follow up)                  Penelope Joseph MD  Department of Hospital Medicine   [facility] ([facility])

## 2024-08-20 NOTE — ANESTHESIA PROCEDURE NOTES
Intubation    Date/Time: 8/20/2024 12:00 PM    Performed by: Danny Abbott CRNA  Authorized by: Javi Briggs MD    Intubation:     Induction:  Intravenous    Intubated:  Postinduction    Mask Ventilation:  Easy mask    Attempts:  1    Attempted By:  Student    Method of Intubation:  Direct    Blade:  Michele 3    Laryngeal View Grade: Grade I - full view of cords      Difficult Airway Encountered?: No      Complications:  None    Airway Device:  Oral endotracheal tube    Airway Device Size:  7.0    Style/Cuff Inflation:  Cuffed    Tube secured:  21    Secured at:  The lips    Placement Verified By:  Capnometry    Complicating Factors:  None    Findings Post-Intubation:  BS equal bilateral

## 2024-08-20 NOTE — HPI
"Hannah Montoya is a 68 y.o. female with PMH significant for non-small cell lung cancer (treated w/ chemo & radiation), chronic liver disease, diabetes, hypertension, hyperlipidemia, COPD, radiation pneumonitis, breast cancer, dilated cardiomyopathy, and pacemaker placement presenting as a transfer from [facility] for orthopaedic oncology evaluation. She reports 3 week history of pain in the left hip radiating to left knee that seems to be worsening. Patient denies recent injury. They originally presented to [facility] after feeling a "pop" in the left leg while walking and inability to bear weight on the left leg afterwards. Patient denies any head trauma or LOC. The patient endorses prior hx of a fall a few weeks ago. Patient denies numbness and tingling. Denies any other musculoskeletal pain or injuries. No known history of prior left leg injury or surgery. Walks w/out assisted devices at baseline, although has been walking with a cane since the onset of her left hip pain. Lives with her son. Doesn't take any anticoagulation at baseline.    They deny IV drug use.  They deny current tobacco use. Quit smoking in 2016.  They deny alcohol use.   They deny immunosuppressant medications.  They endorse chemotherapy - 2 rounds with last being August 2023.  They endorse radiation therapy - 2 rounds within the past year.    Per most recent Heme/onc note:  Oncology History   NSCLC of right lung   9/29/2020 Cancer Staged     Staging form: Lung, AJCC 8th Edition  - Clinical stage from 9/29/2020: Stage IIIA (cT3, cN1, cM0)      10/14/2020 Initial Diagnosis     NSCLC of right lung      11/17/2020 - 12/30/2020 Radiation Therapy     Treating physician: Harvinder Lara     Site  Technique  Energy  Dose/Fx (Gy)  #Fx  Total Dose (Gy)    RLL Lung  VMAT  6X  2  30 / 30  60           2/6/2023 -  Chemotherapy     Treatment Summary   Plan Name: OP NSCLC NIVOLUMAB 360 MG D1 & D22 WITH IPILIMUMAB 1 " MG/KG Q6W PLUS CARBOPLATIN (AUC) PACLITAXEL Q3W X2 DOSES  Treatment Goal: Control  Status: Active  Start Date: 2/6/2023  End Date: 2/10/2025 (Planned)  Provider: Javi Avina MD  Chemotherapy: CARBOplatin (PARAPLATIN) 525 mg in sodium chloride 0.9% 337.5 mL chemo infusion, 525 mg, Intravenous, Clinic/HOD 1 time, 1 of 1 cycle  Administration: 525 mg (2/6/2023), 490 mg (2/27/2023)  PACLitaxeL (TAXOL) 200 mg/m2 = 396 mg in sodium chloride 0.9% 500 mL chemo infusion, 200 mg/m2 = 396 mg (100 % of original dose 200 mg/m2), Intravenous, Clinic/HOD 1 time, 1 of 1 cycle  Dose modification: 200 mg/m2 (original dose 200 mg/m2, Cycle 1), 200 mg/m2 (original dose 200 mg/m2, Cycle 1)  Administration: 396 mg (2/6/2023), 396 mg (2/27/2023)      6/12/2023 - 6/30/2023 Radiation Therapy     Treating physician: Harvinder Lara     Site Technique Energy Dose/Fx (Gy) #Fx Total Dose (Gy)   RLL Lung RtLung 6X 4 15 / 15 60          7/31/2024 Cancer Staged     Staging form: Lung, AJCC 8th Edition  - Clinical stage from 7/31/2024: Stage DEREK (cT3, cN2, cM1a)

## 2024-08-20 NOTE — OP NOTE
OPERATIVE NOTE     DATE OF PROCEDURE:  08/20/2024     PREOPERATIVE DIAGNOSIS:           Left pathologic intertrochanteric hip fracture, closed, non displaced, initial encounter  Metastatic lung cancer     POSTOPERATIVE DIAGNOSIS:         Left pathologic intertrochanteric hip fracture, closed, non displaced, initial encounter  Metastatic lung cancer     PROCEDURE:              Open reduction internal fixation left intertrochanteric hip fracture with intramedullary nail  Open bone biopsy left proximal femur     SURGEON:       Porter Campos MD     ASSISTANT:                 Mason Mai MD     ANESTHESIA:              General     EBL:                  200 mL     COMPLICATIONS:  none     IMPLANTS:       Synthes TFNa, 10 x 360 mm  Cephallomedullary screw, 85 mm  Distal interlocking screw, 5 mm, x1     TraumaCem PMMA cement, 5mL    SPECIMENS:    None     INDICATIONS FOR PROCEDURE:  68-year-old female with metastatic lung cancer and several week history of left hip pain that acutely worsened 08/18/2024.  She was unable to bear weight on her left lower extremity.  She was seen at outside hospital where x-rays and CT scan indicated pathologic lesion in the left peritrochanteric region with a nondisplaced fracture line in the intertrochanteric area.  She was then transferred to our facility 08/1924     At the time of my evaluation, patient complained of isolated pain in left hip, 7/10, sharp, stabbing, worse with motion, improved with rest.  No numbness and tingling.     Lives at home with son  Community ambulator with walker  Perform own ADLs   Prior smoker  Known metastatic lung cancer status post chemotherapy and radiation   Also with history of breast cancer  COPD, diabetes     Discussed diagnosis of intertrochanteric hip fracture with both patient and family members.  Discussed both non operative and operative management options.  Non operative management would consist of bed rest, protected weight bearing and  would likely result in a malunion/nonunion, prolonged pain, debility, and the medical comorbidities associated with prolonged bed rest/immobility.  Discussed operative intervention the form of open reduction internal fixation with intramedullary nail.  Hopefully this would improve pain, alignment, mobility, healing potential, overall outcome.  Also discussed doing a biopsy to confirmed that the lesion in her femur is due to her known metastatic cancer     The risks, benefits, and alternatives to surgery were discussed with the patient and/or family.    Specific risks discussed included, but were not limited to:  Limb length discrepancy, malrotation, head perforation, avascular necrosis, hip arthritis, abductor pain, limp, gait difficulty, failure of hardware, damage to nearby structures, including neurovascular structures leading to loss of function or loss of limb, bleeding, need for blood transfusion, pain, stiffness, scarring, numbness, tingling, weakness, compartment syndrome, malunion/nonunion, hardware failure, hardware prominence, infection, need for multiple staged procedures, prolonged antibiotics, iatrogenic fracture, heterotopic ossification, arthritis, a variety of medical complications including but not limited to heart attack, stroke, deep venous thrombosis, pulmonary embolism, prolonged hospitalization, prolonged intubation, and death.   Patient and/or family expressed an understanding and desires to proceed with surgery.   All questions were answered.  No guarantees were implied or stated.  Informed consent was obtained.        OPERATIVE PROCEDURE:  Patient met in the preop hold area, the correct site and side of surgery being the left hip were marked and verified.  Regional block placed by Anesthesia.  Patient brought back to the operative suite.     General anesthesia smoothly induced.    Fracture boots were placed.  Patient was transferred over to operative table and placed in supine position.   Peroneal post was placed. Operative side arm was draped across the chest and well padded. All bony prominences were appropriately padded.  Fluoroscopy confirmed the nondisplaced nature of the fracture..  Left lower extremity was prepped and draped in normal sterile fashion. Preoperative antibiotics of Ancef 2g were given. 1g IV TXA was given to aid in hemostasis     Time-out was performed verifying the correct patient, site/side of surgery, surgical consent, radiographs as applicable, preop antibiotics, necessary equipment, anticipated blood loss, length of procedure, postoperative disposition.     We started with the open bone biopsy of the left proximal femur. We began the case by marking anatomic landmarks on the patient. Incision was made proximal to the greater trochanter.   Fascia was incised. Awl was utilized. Guide pin was entered in a center center position confirmed on both AP and lateral fluoroscopic imaging.  It was advanced to the level of the lesser trochanter. Opening Reamer with appropriate soft tissue guide was utilized to open the canal.  We then used a pituitary rongeur to take multiple samples of the bony tissue in the area of the lesion and sent this for pathology.  This was performed under fluoroscopic guidance.    We then turned our attention to open reduction internal fixation of the left femur intertrochanteric fracture. Ball-tipped guidewire was inserted in an intramedullary location confirmed on fluoroscopic imaging.  This was advanced to the superior pole of patella. Nail was measured. We reamed once with a 11.5mm reamer to confirm that a 10 mm nail would fit.  Appropriate size/length nail was placed on the insertion jig and inserted into the intramedullary canal, confirmed on multiplanar fluoroscopic imaging.  This was inserted to the appropriate depth and ensured to not perforate anterior femoral cortex on lateral imaging.       We then used the outrigger guide and triple sleeve  inserted through a lateral incision on the thigh to place a guide pin in appropriate center center position through the femoral neck and head.  We used fluoroscopic imaging to confirm our  pin placement.  This was appropriate tip apex distance.  We confirmed that the tip of the wire did not perforate femoral head.  We then used the lateral opening Reamer.  The appropriate size helical blade was then malleted into place with the appropriate tip apex distance on AP and lateral views. Set screw tightened.  It was left statically locked due to the pathologic nature of the fracture.    Due the patient's known osteoporosis and poor bone quality chose to augment femoral neck and head with cement.  TraumaCem was mixed on the back table, and 5 mL was injected through the cannula into the femoral head and neck.  No cement extravasation was noted on fluoroscopy.    We turned our attention to placement of distal interlocking screws.  We chose to place 2 distal interlocking screw to provide stability in axial loading and rotation.  2 screws were placed from lateral to medial using percutaneous stab incision and perfect [place] technique. We confirmed appropriate placement of the screws on both AP and lateral fluoroscopic imaging.      Final fluoroscopic imaging of the entire femur was taken in AP and lateral views confirming appropriate hardware placement fracture reduction.     Wounds were thoroughly irrigated with saline. Fascia was closed with 0 Vicryl.  Subcutaneous tissue closed with 2 O Vicryl. Skin closed with 3 Monocryl. Dermabond applied.  Aquacel dressing applied.     Prior to final closure all counts were confirmed to be correct.  Patient tolerated procedure well, was extubated, transferred to PACU for further recovery.     At the conclusion of the procedure the patient had soft and compressible compartments, brisk cap refill, palpable DP and PT pulse in the operative extremity.     POSTOPERATIVE PLAN:  68-year-old  female, metastatic lung cancer, pathologic fracture left proximal femur intertrochanteric region     08/20/2024 - IM nail left hip IT pathologic fracture, open bone biopsy     Antibiotics x 24 hr  eliquis x 4 weeks postop for DVT prophylaxis     Hospitalist comanagement  Multimodal pain management  Calcium, vitamin-D, boost  PT     Fragility fracture Clinic referral     WBAT left lower extremity, recommend using walker forever  Left lower extremity, range of motion as tolerated     X-ray AP pelvis and left femur AP lateral views at subsequent followups     Follow-up postop 2 weeks, 6 weeks, 3 months, 6 months, 1 year           =====================  Porter Campos MD  Orthopaedic Surgery

## 2024-08-20 NOTE — ASSESSMENT & PLAN NOTE
Hannah Montoya is a 68 y.o. female with PMH of non-small cell lung cancer (treated w/ chemo & radiation), chronic liver disease, diabetes, hypertension, hyperlipidemia, COPD, radiation pneumonitis, breast cancer, dilated cardiomyopathy, and pacemaker placement presenting as a transfer from [facility] for orthopaedic oncology evaluation after presenting for 3 week history of pain in the left hip radiating to left knee that seems to be worsening. Imaging of the left hip (XR & CT) with oblique osteolytic lesion w/ cortical destruction of the left intertrochanteric region of femur.     Plan for prophylactic operative fixation of left femur and biopsy of lytic lesion on 8/20/24.    -Admit to medicine service for pre-operative optimization  -Pain: multimodal regimen  -DVT Ppx: hold chemical, FCD's at all times  - Mirel's score of 12, lytic lesion, >2/3 cortical destruction, functional pain at the peritrochanteric region  - NWB to LLE at this time  -Abx: preop abx ordered  -Carrillo  -NPO midnight      Informed consent was obtained, patient marked, and case booked   Pre-operative labs:  Lab Results   Component Value Date    HGB 9.5 (L) 08/19/2024     08/19/2024    CREATININE 1.0 08/19/2024     (H) 08/19/2024      Lab Results   Component Value Date    INR 1.0 08/19/2024        Extensive discussion was held regarding the severity of the patient's condition.  The risks/benefits/alternatives to operative management were explained, with risks including but not limited to: infection, bleeding, pain, stiffness, nerve/vascular injury, heterotopic ossification, hardware failure, DVT/PE, even death. They express full understanding and wish to proceed with surgery.  No guarantees were implied or stated, and all questions were answered.

## 2024-08-20 NOTE — SUBJECTIVE & OBJECTIVE
Interval history- transferred last night from [facility] for ortho. Plan for nail to hip today with ortho and saw with family at bedside before going to OR today for this. She reports dr raza had gotten a CT that was showing possible area of concern in hip and then had the popping sound/more pain and then CT at [facility] ([place] showed the lesion that had impending fracture so she was aware before that admit that there was lkely something cancerous there that was new. She was planning for infusio with dr raza upcoming in next week as well as dr riley with rad onc appt Friday. Her mag is low at 1.4 and replaced. Glucose controlled in 100s. Plan for eliquis post op, end date sept 25th.   I told her id talk to dr raza to see his thoughts on plans and then let her know. He reports that she on immunotherapy so wouldn't have wound healing issues with that so could start immediately on discharge. He reports that in [place] it is against the law for rehabs to deny cancer treatment when at post acute facilities but have trouble anyways getting them to allow treatment, infusions while there so may have to hold/delay if at post acute care. I told him id touch base with him tomorrow pendign PT/OT santos to see his thoughts again then that I could relay to her regarding treatment plan post hospital stay and follow ups with him.    Review of patient's allergies indicates:   Allergen Reactions    Taxol [paclitaxel]      Hypersensitivity reaction to taxol, symptoms included shortness of breath, nausea, dizziness, flushing     Carboplatin Other (See Comments)     Itching and hives    Adhesive Rash     tegaderm burns and blistered skin       Current Facility-Administered Medications on File Prior to Encounter   Medication    fentaNYL injection 25 mcg    haloperidol lactate injection 0.5 mg    HYDROmorphone injection 0.2 mg    ondansetron injection 4 mg    sodium chloride 0.9% flush 10 mL     Current Outpatient Medications on File  Prior to Encounter   Medication Sig    metoprolol succinate (TOPROL-XL) 100 MG 24 hr tablet TAKE 1 TABLET BY MOUTH EVERY DAY    ACCU-CHEK ALFRED PLUS TEST STRP Strp USE TO TEST THREE TIMES DAILY    albuterol (ACCUNEB) 1.25 mg/3 mL Nebu use 1 AMPULE (3ml) via NEBULIZER EVERY 6 HOURS AS NEEDED    albuterol (VENTOLIN HFA) 90 mcg/actuation inhaler Inhale 2 puffs into the lungs every 4 (four) hours as needed for Wheezing or Shortness of Breath. Rescue    amLODIPine (NORVASC) 5 MG tablet TAKE 1 TABLET BY MOUTH EVERY DAY    atorvastatin (LIPITOR) 10 MG tablet TAKE 1 TABLET BY MOUTH EVERY DAY    benzonatate (TESSALON) 200 MG capsule Take 1 capsule (200 mg total) by mouth 3 (three) times daily as needed for Cough.    betamethasone dipropionate 0.05 % cream Apply topically 2 (two) times daily as needed (rash). (Patient not taking: Reported on 5/2/2024)    buPROPion (WELLBUTRIN XL) 150 MG TB24 tablet TAKE 1 TABLET BY MOUTH EVERY DAY    cetirizine (ZYRTEC) 10 MG tablet Take 10 mg by mouth every evening.     FLUAD QUAD 2023-24,65Y UP,,PF, 60 mcg (15 mcg x 4)/0.5 mL Syrg     gabapentin (NEURONTIN) 300 MG capsule Take 1 capsule (300 mg total) by mouth 3 (three) times daily.    losartan (COZAAR) 100 MG tablet TAKE 1 TABLET BY MOUTH EVERY DAY    meloxicam (MOBIC) 15 MG tablet Take 1 tablet (15 mg total) by mouth once daily.    metFORMIN (GLUCOPHAGE) 500 MG tablet TAKE 2 TABLETS BY MOUTH TWICE A DAY WITH MEALS    multivitamin (THERAGRAN) per tablet Take 1 tablet by mouth once daily.    mupirocin (BACTROBAN) 2 % ointment Apply topically 3 (three) times daily. (Patient not taking: Reported on 5/2/2024)    OMNIPAQUE 300 300 mg iodine/mL injection  (Patient not taking: Reported on 5/2/2024)    OMNIPAQUE 350 350 mg iodine/mL Soln injection  (Patient not taking: Reported on 5/2/2024)    ondansetron (ZOFRAN-ODT) 8 MG TbDL Take 1 tablet (8 mg total) by mouth every 8 (eight) hours as needed (nausea/vomiting). Take 1 tablet (8 mg) by mouth  every 8 hours as needed for nausea/vomiting.    OPDIVO 120 mg/12 mL Soln  (Patient not taking: Reported on 2024)    OPDIVO 240 mg/24 mL Soln  (Patient not taking: Reported on 2024)    OPDIVO 40 mg/4 mL injection  (Patient not taking: Reported on 2024)    palonosetron (ALOXI) 0.25 mg/5 mL injection     pantoprazole (PROTONIX) 40 MG tablet TAKE 1 TABLET BY MOUTH EVERY DAY    READI-CAT 2 2 % (w/v) suspension     READI-CAT 2 2.1 % (w/v), 2.0 % (w/w) suspension     semaglutide (OZEMPIC) 0.25 mg or 0.5 mg(2 mg/1.5 mL) pen injector Inject 0.5 mg into the skin every 7 days. (Patient not taking: Reported on 2024)    sertraline (ZOLOFT) 100 MG tablet TAKE 1 TABLET BY MOUTH EVERY DAY IN THE EVENING    tiotropium (SPIRIVA WITH HANDIHALER) 18 mcg inhalation capsule INHALE THE CONTENTS OF 1 CAPSULE BY MOUTH EVERY DAY    triamcinolone acetonide 0.1% (KENALOG) 0.1 % cream APPLY TOPICALLY TWICE A DAY (Patient not taking: Reported on 2024)    WIXELA INHUB 250-50 mcg/dose diskus inhaler INHALE 1 PUFF INTO THE LUNGS 2 (TWO) TIMES DAILY. CONTROLLER    YERVOY 50 mg/10 mL (5 mg/mL)  (Patient not taking: Reported on 2024)     Family History       Problem Relation (Age of Onset)    Cataracts Mother, Father    Clotting disorder Brother    Kidney disease Mother, Father    Macular degeneration Maternal Grandmother    No Known Problems Daughter, Son, Sister, Maternal Aunt, Maternal Uncle, Paternal Aunt, Paternal Uncle, Maternal Grandfather, Paternal Grandmother, Paternal Grandfather          Tobacco Use    Smoking status: Former     Current packs/day: 0.00     Average packs/day: 0.5 packs/day for 40.0 years (20.0 ttl pk-yrs)     Types: Cigarettes     Start date: 1976     Quit date: 2016     Years since quittin.0     Passive exposure: Past    Smokeless tobacco: Never   Substance and Sexual Activity    Alcohol use: Yes     Alcohol/week: 1.0 standard drink of alcohol     Types: 1 Glasses of wine per week      Comment: rare- holiday    Drug use: No    Sexual activity: Not Currently     Partners: Male     Comment:      Review of Systems   Constitutional:  Negative for chills and fever.   Respiratory:  Negative for shortness of breath.    Cardiovascular:  Negative for chest pain.   Gastrointestinal:  Negative for abdominal pain, diarrhea, nausea and vomiting.   Genitourinary:  Negative for dysuria and hematuria.   Musculoskeletal:  Positive for arthralgias (left hip and left knee) and joint swelling (left knee).   Neurological:  Negative for dizziness, syncope and light-headedness.     Objective:     Vital Signs (Most Recent):  Temp: 97 °F (36.1 °C) (08/20/24 1028)  Pulse: 70 (08/20/24 1140)  Resp: 16 (08/20/24 1140)  BP: (!) 111/57 (08/20/24 1140)  SpO2: 100 % (08/20/24 1140) Vital Signs (24h Range):  Temp:  [97 °F (36.1 °C)-98.2 °F (36.8 °C)] 97 °F (36.1 °C)  Pulse:  [70-84] 70  Resp:  [12-20] 16  SpO2:  [91 %-100 %] 100 %  BP: (111-145)/(56-70) 111/57     Weight: 92.8 kg (204 lb 9.4 oz)  Body mass index is 37.42 kg/m².     Physical Exam  Vitals reviewed.   Constitutional:       General: She is awake. She is not in acute distress.     Appearance: Normal appearance. She is not ill-appearing or diaphoretic.   Cardiovascular:      Rate and Rhythm: Normal rate.      Heart sounds: Normal heart sounds. No murmur heard.     No friction rub. No gallop.      Comments: Trace BLE edema  Pulmonary:      Effort: Pulmonary effort is normal. No accessory muscle usage or respiratory distress.      Breath sounds: Normal breath sounds. No wheezing, rhonchi or rales.   Abdominal:      General: Bowel sounds are normal.      Palpations: Abdomen is soft.      Tenderness: There is no abdominal tenderness. There is no guarding or rebound.   Musculoskeletal:      Comments: No obvious deformities noted to left hip.  No overlying skin changes or ecchymosis.  No tenderness to palpation.  Range of motion limited due to pain.    Slight swelling  appreciated to left knee.  Small area of ecchymosis noted.  No tenderness to palpation.   Skin:     General: Skin is warm and dry.   Neurological:      Mental Status: She is alert and oriented to person, place, and time.   Psychiatric:         Behavior: Behavior is cooperative.                Significant Labs: All pertinent labs within the past 24 hours have been reviewed.  CBC:   Recent Labs   Lab 08/19/24  0436 08/20/24  0240   WBC 5.98 6.01   HGB 9.5* 9.2*   HCT 32.4* 31.5*    262     CMP:   Recent Labs   Lab 08/19/24  0436 08/20/24  0240    136   K 4.2 4.8    103   CO2 26 25   * 162*   BUN 20 26*   CREATININE 1.0 1.1   CALCIUM 10.7* 10.4   PROT 7.3 6.9   ALBUMIN 3.1* 2.8*   BILITOT 0.2 0.3   ALKPHOS 63 62   AST 17 14   ALT 10 10   ANIONGAP 12 8       Significant Imaging:   Imaging Results               CT Hip Without Contrast Left (Final result)  Result time 08/19/24 01:06:29      Final result by Marlene Herrera MD (08/19/24 01:06:29)                   Impression:      As above described.    This report was flagged in Epic as abnormal.      Electronically signed by: Marlene Herrera  Date:    08/19/2024  Time:    01:06               Narrative:    EXAMINATION:  CT ABDOMEN PELVIS WITHOUT    CLINICAL HISTORY:  Left hip pain.    TECHNIQUE:  1.25 mm unenhanced axial images from the lung bases through the greater trochanters were performed.  Coronal and sagittal reformatted images were provided.    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  There is no hip dislocation.  There is no displaced fracture.  Degenerative changes are seen in the visualized lower lumbar spine.                                       X-Ray Hip 2 or 3 views Left with Pelvis when performed (Final result)  Result time 08/18/24 22:51:12      Final result by Marlene Herrera MD (08/18/24  22:51:12)                   Impression:      As above described.      Electronically signed by: Marlene Herrera  Date:    08/18/2024  Time:    22:51               Narrative:    EXAMINATION:  XR HIP WITH PELVIS WHEN PERFORMED 2 OR THREE VIEWS LEFT    CLINICAL HISTORY:  Pain in left hip    TECHNIQUE:  AP and lateral view of the left hip    COMPARISON:  None.    FINDINGS:  AP pelvis and two views of the left hip demonstrate an oblique lucency in the left intertrochanteric region.  Finding may represent artifact versus nondisplaced fracture.  There is mild degenerative changes at the hip joints.

## 2024-08-20 NOTE — ASSESSMENT & PLAN NOTE
Impending fx from [facility] seen on imaging, transfer here, ortho to OR 8/20 for IM nail  -talked to dr raza with heme onc, aware of admit, plan for immunotherapy as outpatient for treatment  -eliquis post op, end date sept 25  -pt/ot pod 1  -multimodal pain meds, bowel regimen  -hg 9.5 pre op  Carrillo out POD 1

## 2024-08-20 NOTE — NURSING
Pt arrived to unit via bed alert and calm. No c/o of nausea. VSS. Pain medicine administered. Dressing c/d/I. Carrillo in place draining clear, yellow, urine.

## 2024-08-20 NOTE — ANESTHESIA PREPROCEDURE EVALUATION
[facility]  Anesthesia Pre-Operative Evaluation         Patient Name: Hannah Montoya  YOB: 1955  MRN: [id number]    SUBJECTIVE:     Pre-operative evaluation for Procedure(s) (LRB):  INSERTION, INTRAMEDULLARY ANTOINE, FEMUR + bone biopsy - Callie table, supine, Synthes TFN blade + cement. Pituitary roungeur for biopsy. (Left)  Scheduled for 8/20/2024    HPI 08/19/2024:  Hannah Montoya is a 68 y.o. female with hx of breast cancer, NSCLC of RLL, dilated non-ischemic cardiomyopathy, LBBB s/p CRT-D, COPD, T2DM, and obesity. She was transferred here with inability to bear weight on the left leg in setting of a lesion seen on CT scan concerning for impending pathologic fracture. Presents for prophylactic stabilization.    Stress echo 10/14/2020:  The stress echo portion of this study is negative for myocardial ischemia.  The ECG portion of this study is negative for myocardial ischemia.  During stress, the following significant arrhythmias were observed: occasional PVCs.  The patient's exercise capacity was above average.  The patient reached the end of the protocol.  With normal systolic function. The estimated ejection fraction is 60%.  Grade II diastolic dysfunction.  Normal right ventricular systolic function.  Mild left atrial enlargement.  The estimated PA systolic pressure is 40 mmHg.  Normal central venous pressure (3 mmHg).               Patient Active Problem List   Diagnosis    Essential hypertension    Type 2 diabetes mellitus without complication    Seasonal allergies    Left bundle-branch block    Dilated cardiomyopathy    NICM (nonischemic cardiomyopathy)    Cardiac resynchronization therapy defibrillator (CRT-D) in place    History of breast cancer in female    Lung mass    Refractive error    Nuclear sclerosis of both eyes    NSCLC of right lung    Class 2 severe obesity due to excess calories with serious comorbidity in adult    Abnormal thyroid function test     Moderate episode of recurrent major depressive disorder    Left hip pain       Review of patient's allergies indicates:   Allergen Reactions    Taxol [paclitaxel]      Hypersensitivity reaction to taxol, symptoms included shortness of breath, nausea, dizziness, flushing     Carboplatin Other (See Comments)     Itching and hives    Adhesive Rash     tegaderm burns and blistered skin       Outpatient Medications:  Current Facility-Administered Medications on File Prior to Encounter   Medication Dose Route Frequency Provider Last Rate Last Admin    fentaNYL injection 25 mcg  25 mcg Intravenous Q5 Min PRKeesha Mirza MD        haloperidol lactate injection 0.5 mg  0.5 mg Intravenous Once PRKeesha Mirza MD        HYDROmorphone injection 0.2 mg  0.2 mg Intravenous Q5 Min PRKeesha Mirza MD        ondansetron injection 4 mg  4 mg Intravenous Once PRKeesha Mirza MD        sodium chloride 0.9% flush 10 mL  10 mL Intravenous PRKeesha Mirza MD         Current Outpatient Medications on File Prior to Encounter   Medication Sig Dispense Refill    ACCU-CHEK ALFRED PLUS TEST STRP Strp USE TO TEST THREE TIMES DAILY 200 strip 3    albuterol (ACCUNEB) 1.25 mg/3 mL Nebu use 1 AMPULE (3ml) via NEBULIZER EVERY 6 HOURS AS NEEDED 300 mL 3    albuterol (VENTOLIN HFA) 90 mcg/actuation inhaler Inhale 2 puffs into the lungs every 4 (four) hours as needed for Wheezing or Shortness of Breath. Rescue 18 g 4    amLODIPine (NORVASC) 5 MG tablet TAKE 1 TABLET BY MOUTH EVERY DAY 90 tablet 2    atorvastatin (LIPITOR) 10 MG tablet TAKE 1 TABLET BY MOUTH EVERY DAY 90 tablet 1    benzonatate (TESSALON) 200 MG capsule Take 1 capsule (200 mg total) by mouth 3 (three) times daily as needed for Cough. 30 capsule 1    betamethasone dipropionate 0.05 % cream Apply topically 2 (two) times daily as needed (rash). (Patient not taking: Reported on 5/2/2024) 45 g 2    buPROPion (WELLBUTRIN XL) 150 MG TB24 tablet TAKE 1 TABLET BY MOUTH EVERY DAY 90 tablet 3     cetirizine (ZYRTEC) 10 MG tablet Take 10 mg by mouth every evening.       FLUAD QUAD 2023-24,65Y UP,,PF, 60 mcg (15 mcg x 4)/0.5 mL Syrg       gabapentin (NEURONTIN) 300 MG capsule Take 1 capsule (300 mg total) by mouth 3 (three) times daily. 90 capsule 11    losartan (COZAAR) 100 MG tablet TAKE 1 TABLET BY MOUTH EVERY DAY 90 tablet 3    meloxicam (MOBIC) 15 MG tablet Take 1 tablet (15 mg total) by mouth once daily. 30 tablet 0    metFORMIN (GLUCOPHAGE) 500 MG tablet TAKE 2 TABLETS BY MOUTH TWICE A DAY WITH MEALS 360 tablet 3    metoprolol succinate (TOPROL-XL) 100 MG 24 hr tablet TAKE 1 TABLET BY MOUTH EVERY DAY 90 tablet 3    multivitamin (THERAGRAN) per tablet Take 1 tablet by mouth once daily.      mupirocin (BACTROBAN) 2 % ointment Apply topically 3 (three) times daily. (Patient not taking: Reported on 5/2/2024) 30 g 1    OMNIPAQUE 300 300 mg iodine/mL injection  (Patient not taking: Reported on 5/2/2024)      OMNIPAQUE 350 350 mg iodine/mL Soln injection  (Patient not taking: Reported on 5/2/2024)      ondansetron (ZOFRAN-ODT) 8 MG TbDL Take 1 tablet (8 mg total) by mouth every 8 (eight) hours as needed (nausea/vomiting). Take 1 tablet (8 mg) by mouth every 8 hours as needed for nausea/vomiting. 60 tablet 5    OPDIVO 120 mg/12 mL Soln  (Patient not taking: Reported on 5/2/2024)      OPDIVO 240 mg/24 mL Soln  (Patient not taking: Reported on 5/2/2024)      OPDIVO 40 mg/4 mL injection  (Patient not taking: Reported on 5/2/2024)      palonosetron (ALOXI) 0.25 mg/5 mL injection       pantoprazole (PROTONIX) 40 MG tablet TAKE 1 TABLET BY MOUTH EVERY DAY 90 tablet 3    READI-CAT 2 2 % (w/v) suspension       READI-CAT 2 2.1 % (w/v), 2.0 % (w/w) suspension       semaglutide (OZEMPIC) 0.25 mg or 0.5 mg(2 mg/1.5 mL) pen injector Inject 0.5 mg into the skin every 7 days. (Patient not taking: Reported on 5/2/2024) 1.5 mL 6    sertraline (ZOLOFT) 100 MG tablet TAKE 1 TABLET BY MOUTH EVERY DAY IN THE EVENING 90 tablet 2     tiotropium (SPIRIVA WITH HANDIHALER) 18 mcg inhalation capsule INHALE THE CONTENTS OF 1 CAPSULE BY MOUTH EVERY DAY 90 capsule 3    triamcinolone acetonide 0.1% (KENALOG) 0.1 % cream APPLY TOPICALLY TWICE A DAY (Patient not taking: Reported on 2024) 454 g 2    WIXELA INHUB 250-50 mcg/dose diskus inhaler INHALE 1 PUFF INTO THE LUNGS 2 (TWO) TIMES DAILY. CONTROLLER 180 each 3    YERVOY 50 mg/10 mL (5 mg/mL)  (Patient not taking: Reported on 2024)          Current Inpatient Medications:   amLODIPine  5 mg Oral Daily    atorvastatin  10 mg Oral Daily    buPROPion  150 mg Oral Daily    cetirizine  10 mg Oral QHS    enoxparin  40 mg Subcutaneous Daily    gabapentin  300 mg Oral TID    losartan  100 mg Oral Daily    metoprolol succinate  100 mg Oral Daily    multivitamin  1 tablet Oral Daily    pantoprazole  40 mg Oral Daily    sertraline  100 mg Oral QHS       Past Surgical History:   Procedure Laterality Date    BIOPSY, WITH CT GUIDANCE Right 2023    Procedure: LUNG MASS BIOPSY, WITH CT GUIDANCE;  Surgeon: Yehuda Green MD;  Location: [facility] CATH LAB;  Service: Radiology;  Laterality: Right;    BREAST BIOPSY Right 2016    IDC    BREAST LUMPECTOMY Right     CARDIAC CATHETERIZATION Bilateral 2017    CARDIAC DEFIBRILLATOR PLACEMENT Left 08/10/2017    CARDIAC DEFIBRILLATOR PLACEMENT Left 2018     SECTION      x2    CHOLECYSTECTOMY      INSERTION OF TUNNELED CENTRAL VENOUS CATHETER (CVC) WITH SUBCUTANEOUS PORT Right 2020    Procedure: WOYUDOCLN-PADT-Y-CATH, RIGHT;  Surgeon: Josefa Caceres MD;  Location: [address];  Service: General;  Laterality: Right;  Port-a-cath placed to R. IJ    LUNG BIOPSY N/A 2020    Procedure: BIOPSY, LUNG;  Surgeon: [facility] Diagnostic Provider;  Location: [facility] OR;  Service: Radiology;  Laterality: N/A;  8AM START  RN PREOP 2020---COVID NEGATIVE    NAVIGATIONAL BRONCHOSCOPY N/A 10/13/2020    Procedure: BRONCHOSCOPY, NAVIGATIONAL;  Surgeon:  Jamilah Weaver MD;  [facility];  Service: Pulmonary;  Laterality: N/A;    REVISION OF IMPLANTABLE CARDIOVERTER-DEFIBRILLATOR (ICD) ELECTRODE LEAD PLACEMENT N/A 6/15/2018    Procedure: REVISION-LEAD-ICD;  Surgeon: Javy Gates MD;  Location: [facility] CATH LAB;  Service: Cardiology;  Laterality: N/A;  LBBB, Bi-V ICD HIS Ld rev, MDT, Choice, MB, 3 Prep    ROBOT-ASSISTED LAPAROSCOPIC LYMPHADENECTOMY USING DA SALVADOR XI Right 10/23/2020    Procedure: XI ROBOTIC LYMPHADENECTOMY;  Surgeon: Ramo Tucker MD;  [facility];  Service: Thoracic;  Laterality: Right;    TUBAL LIGATION         Social History     Socioeconomic History    Marital status:     Number of children: 2   Occupational History     Employer: [employer]   Tobacco Use    Smoking status: Former     Current packs/day: 0.00     Average packs/day: 0.5 packs/day for 40.0 years (20.0 ttl pk-yrs)     Types: Cigarettes     Start date: 1976     Quit date: 2016     Years since quittin.0     Passive exposure: Past    Smokeless tobacco: Never   Substance and Sexual Activity    Alcohol use: Yes     Alcohol/week: 1.0 standard drink of alcohol     Types: 1 Glasses of wine per week     Comment: rare- holiday    Drug use: No    Sexual activity: Not Currently     Partners: Male     Comment:      Social Determinants of Health     Financial Resource Strain: Low Risk  (2024)    Overall Financial Resource Strain (CARDIA)     Difficulty of Paying Living Expenses: Not very hard   Food Insecurity: No Food Insecurity (2024)    Hunger Vital Sign     Worried About Running Out of Food in the Last Year: Never true     Ran Out of Food in the Last Year: Never true   Transportation Needs: No Transportation Needs (2024)    PRAPARE - Transportation     Lack of Transportation (Medical): No     Lack of Transportation (Non-Medical): No   Physical Activity: Inactive (2024)    Exercise Vital Sign     Days of  Exercise per Week: 0 days     Minutes of Exercise per Session: 0 min   Stress: No Stress Concern Present (2/13/2024)    [employer] - Occupational Stress Questionnaire     Feeling of Stress : Only a little   Housing Stability: Low Risk  (2/13/2024)    Housing Stability Vital Sign     Unable to Pay for Housing in the Last Year: No     Number of Places Lived in the Last Year: 1     Unstable Housing in the Last Year: No       OBJECTIVE:     Weight:  Wt Readings from Last 1 Encounters:   08/19/24 92.8 kg (204 lb 9.4 oz)     Body mass index is 37.42 kg/m².    Recent Blood Pressure Readings:  BP Readings from Last 3 Encounters:   08/19/24 127/62   03/07/24 135/68   02/16/24 (!) 140/72       Vital Signs Range (Last 24H):  Temp:  [36.4 °C (97.6 °F)-36.8 °C (98.2 °F)]   Pulse:  [73-92]   Resp:  [18-19]   BP: (127-178)/(60-99)   SpO2:  [91 %-99 %]       CBC:   Lab Results   Component Value Date    WBC 5.98 08/19/2024    HGB 9.5 (L) 08/19/2024    HCT 32.4 (L) 08/19/2024    MCV 79 (L) 08/19/2024     08/19/2024       CMP:     Chemistry        Component Value Date/Time     08/19/2024 0436    K 4.2 08/19/2024 0436     08/19/2024 0436    CO2 26 08/19/2024 0436    BUN 20 08/19/2024 0436    CREATININE 1.0 08/19/2024 0436     (H) 08/19/2024 0436        Component Value Date/Time    CALCIUM 10.7 (H) 08/19/2024 0436    ALKPHOS 63 08/19/2024 0436    AST 17 08/19/2024 0436    ALT 10 08/19/2024 0436    BILITOT 0.2 08/19/2024 0436    ESTGFRAFRICA >60.0 06/30/2022 0856    EGFRNONAA 59.7 (A) 06/30/2022 0856            INR:  Lab Results   Component Value Date    INR 1.0 08/19/2024    INR 1.0 08/19/2024    INR 1.0 11/10/2023       Diagnostic Studies:      EKG:     Results for orders placed or performed during the hospital encounter of 11/10/23   EKG 12-lead    Collection Time: 11/10/23 11:55 AM    Narrative    Test Reason : R06.02,    Vent. Rate : 111 BPM     Atrial Rate : 111 BPM     P-R  Int : 116 ms          QRS Dur : 108 ms      QT Int : 398 ms       P-R-T Axes : 049 006 162 degrees     QTc Int : 541 ms    Atrial-sensed ventricular-paced rhythm  Abnormal ECG  When compared with ECG of 10-JUL-2023 08:20,  Significant changes have occurred  Confirmed by Javy Packer MD ([id number]) on 11/14/2023 4:38:10 PM    Referred By: JOHNNY   SELF           Confirmed By:Javy Packer MD       2D Echo:    No results found. However, due to the size of the patient record, not all encounters were searched. Please check Results Review for a complete set of results.    No results found. However, due to the size of the patient record, not all encounters were searched. Please check Results Review for a complete set of results.    No results found. However, due to the size of the patient record, not all encounters were searched. Please check Results Review for a complete set of results.      ASSESSMENT/PLAN:           Pre-op Assessment    I have reviewed the Patient Summary Reports.    I have reviewed the NPO Status.      Review of Systems  Anesthesia Hx:  No problems with previous Anesthesia               Denies Personal Hx of Anesthesia complications.                    Social:  Former Smoker       Hematology/Oncology:                      Current/Recent Cancer.  Breast       surgery       Cardiovascular:    Pacemaker Hypertension    Dysrhythmias   CHF                                 Pulmonary:   COPD Asthma                    Hepatic/GI:      Denies GERD.             Neurological:    Denies CVA.    Denies Seizures.                                Endocrine:  Diabetes, type 2         Obesity / BMI > 30  Psych:  Psychiatric History                  Physical Exam  General: Well nourished, Cooperative, Alert and Oriented    Airway:  Mallampati: II   Mouth Opening: Normal  TM Distance: Normal  Tongue: Normal  Neck ROM: Normal ROM    Dental:  Intact        Anesthesia Plan  Type of Anesthesia, risks & benefits  discussed:    Anesthesia Type: Gen ETT, Regional  Intra-op Monitoring Plan: Standard ASA Monitors  Post Op Pain Control Plan: multimodal analgesia and peripheral nerve block  Induction:  IV  Airway Plan: Video, Post-Induction  Informed Consent: Informed consent signed with the Patient and all parties understand the risks and agree with anesthesia plan.  All questions answered.   ASA Score: 3  Day of Surgery Review of History & Physical: H&P Update referred to the surgeon/provider.    Ready For Surgery From Anesthesia Perspective.     .

## 2024-08-20 NOTE — CONSULTS
"[facility]  Orthopedics  Consult Note    Patient Name: Hannah Montoya  MRN: [id number]  Admission Date: 8/18/2024  Hospital Length of Stay: 0 days  Attending Provider: Penelope Joseph MD  Primary Care Provider: Emanuel Chavez MD    Inpatient consult to Orthopedic Surgery  Consult performed by: Lauren Peters MD  Consult ordered by: Stephen Lara MD        Subjective:     Principal Problem:Left hip pain    Chief Complaint:   Chief Complaint   Patient presents with    Hip Pain    Knee Pain     Pt c/o L hip and knee pain that started tonight. Pt states she was using the rest room and heard a  " pop" then was unable to bear weight or ambulate with her L leg. L knee is swollen, reports this happened once before and was seen by an ortho dr. Pt denies trauma. Increased pain with movement.         HPI: Hannah Montoya is a 68 y.o. female with PMH significant for non-small cell lung cancer (treated w/ chemo & radiation), chronic liver disease, diabetes, hypertension, hyperlipidemia, COPD, radiation pneumonitis, breast cancer, dilated cardiomyopathy, and pacemaker placement presenting as a transfer from [facility] for orthopaedic oncology evaluation. She reports 3 week history of pain in the left hip radiating to left knee that seems to be worsening. Patient denies recent injury. They originally presented to [facility] after feeling a "pop" in the left leg while walking and inability to bear weight on the left leg afterwards. Patient denies any head trauma or LOC. The patient endorses prior hx of a fall a few weeks ago. Patient denies numbness and tingling. Denies any other musculoskeletal pain or injuries. No known history of prior left leg injury or surgery. Walks w/out assisted devices at baseline, although has been walking with a cane since the onset of her left hip pain. Lives with her son. Doesn't take any anticoagulation at baseline.    They deny IV drug " use.  They deny current tobacco use. Quit smoking in 2016.  They deny alcohol use.   They deny immunosuppressant medications.  They endorse chemotherapy - 2 rounds with last being August 2023.  They endorse radiation therapy - 2 rounds within the past year.    Per most recent Heme/onc note:  Oncology History   NSCLC of right lung   9/29/2020 Cancer Staged     Staging form: Lung, AJCC 8th Edition  - Clinical stage from 9/29/2020: Stage IIIA (cT3, cN1, cM0)      10/14/2020 Initial Diagnosis     NSCLC of right lung      11/17/2020 - 12/30/2020 Radiation Therapy     Treating physician: Harvinder Lara     Site  Technique  Energy  Dose/Fx (Gy)  #Fx  Total Dose (Gy)    RLL Lung  VMAT  6X  2  30 / 30  60           2/6/2023 -  Chemotherapy     Treatment Summary   Plan Name: OP NSCLC NIVOLUMAB 360 MG D1 & D22 WITH IPILIMUMAB 1 MG/KG Q6W PLUS CARBOPLATIN (AUC) PACLITAXEL Q3W X2 DOSES  Treatment Goal: Control  Status: Active  Start Date: 2/6/2023  End Date: 2/10/2025 (Planned)  Provider: Javi Avina MD  Chemotherapy: CARBOplatin (PARAPLATIN) 525 mg in sodium chloride 0.9% 337.5 mL chemo infusion, 525 mg, Intravenous, Clinic/HOD 1 time, 1 of 1 cycle  Administration: 525 mg (2/6/2023), 490 mg (2/27/2023)  PACLitaxeL (TAXOL) 200 mg/m2 = 396 mg in sodium chloride 0.9% 500 mL chemo infusion, 200 mg/m2 = 396 mg (100 % of original dose 200 mg/m2), Intravenous, Clinic/HOD 1 time, 1 of 1 cycle  Dose modification: 200 mg/m2 (original dose 200 mg/m2, Cycle 1), 200 mg/m2 (original dose 200 mg/m2, Cycle 1)  Administration: 396 mg (2/6/2023), 396 mg (2/27/2023)      6/12/2023 - 6/30/2023 Radiation Therapy     Treating physician: Harvinder Vásquez Technique Energy Dose/Fx (Gy) #Fx Total Dose (Gy)   RLL Lung RtLung 6X 4 15 / 15 60          7/31/2024 Cancer Staged     Staging form: Lung, AJCC 8th Edition  - Clinical stage from 7/31/2024: Stage DEREK (cT3, cN2, cM1a)       Past Medical History:   Diagnosis Date    AICD (automatic  cardioverter/defibrillator) present     Asthma     bronchitis in past    Breast cancer 2016    right    Cardiac pacemaker     Cardiomyopathy     COPD (chronic obstructive pulmonary disease)     Diabetes mellitus, type 2     Hyperglycemia     Hyperlipidemia     Hypertension     Malignant neoplasm of overlapping sites of female breast 2016    Nuclear sclerosis of both eyes 2020    Respiratory distress 3/12/2020       Past Surgical History:   Procedure Laterality Date    BIOPSY, WITH CT GUIDANCE Right 2023    Procedure: LUNG MASS BIOPSY, WITH CT GUIDANCE;  Surgeon: Yehuda Green MD;  Location: [facility] CATH LAB;  Service: Radiology;  Laterality: Right;    BREAST BIOPSY Right 2016    IDC    BREAST LUMPECTOMY Right     CARDIAC CATHETERIZATION Bilateral 2017    CARDIAC DEFIBRILLATOR PLACEMENT Left 08/10/2017    CARDIAC DEFIBRILLATOR PLACEMENT Left 2018     SECTION      x2    CHOLECYSTECTOMY      INSERTION OF TUNNELED CENTRAL VENOUS CATHETER (CVC) WITH SUBCUTANEOUS PORT Right 2020    Procedure: [id number], RIGHT;  Surgeon: Josefa Caceres MD;  Location: [address];  Service: General;  Laterality: Right;  Port-a-cath placed to R. IJ    LUNG BIOPSY N/A 2020    Procedure: BIOPSY, LUNG;  Surgeon: [facility] Provider;  Location: [facility] OR;  Service: Radiology;  Laterality: N/A;  8AM START  RN PREOP 2020---COVID NEGATIVE    NAVIGATIONAL BRONCHOSCOPY N/A 10/13/2020    Procedure: BRONCHOSCOPY, NAVIGATIONAL;  Surgeon: Jamilah Weaver MD;  Location: [address];  Service: Pulmonary;  Laterality: N/A;    REVISION OF IMPLANTABLE CARDIOVERTER-DEFIBRILLATOR (ICD) ELECTRODE LEAD PLACEMENT N/A 6/15/2018    Procedure: REVISION-LEAD-ICD;  Surgeon: Javy Gates MD;  Location: [facility] CATH LAB;  Service: Cardiology;  Laterality: N/A;  LBBB, Bi-V ICD HIS Elmo jordan, MDT, Choice, MB, 3 Prep    ROBOT-ASSISTED LAPAROSCOPIC LYMPHADENECTOMY USING DA SALVADOR XI Right  10/23/2020    Procedure: XI ROBOTIC LYMPHADENECTOMY;  Surgeon: Ramo Tucker MD;  Location: [facility] OR [address] [place] [address];  Service: Thoracic;  Laterality: Right;    TUBAL LIGATION         Review of patient's allergies indicates:   Allergen Reactions    Taxol [paclitaxel]      Hypersensitivity reaction to taxol, symptoms included shortness of breath, nausea, dizziness, flushing     Carboplatin Other (See Comments)     Itching and hives    Adhesive Rash     tegaderm burns and blistered skin       Current Facility-Administered Medications   Medication    acetaminophen tablet 650 mg    aluminum-magnesium hydroxide-simethicone 200-200-20 mg/5 mL suspension 30 mL    amLODIPine tablet 5 mg    atorvastatin tablet 10 mg    benzonatate capsule 200 mg    buPROPion TB24 tablet 150 mg    cetirizine tablet 10 mg    dextrose 10% bolus 125 mL 125 mL    dextrose 10% bolus 250 mL 250 mL    enoxaparin injection 40 mg    gabapentin capsule 300 mg    glucagon (human recombinant) injection 1 mg    glucose chewable tablet 16 g    glucose chewable tablet 24 g    HYDROmorphone injection 0.5 mg    insulin aspart U-100 pen 0-10 Units    losartan tablet 100 mg    melatonin tablet 6 mg    metoprolol succinate (TOPROL-XL) 24 hr tablet 100 mg    multivitamin tablet    naloxone 0.4 mg/mL injection 0.02 mg    ondansetron injection 4 mg    pantoprazole EC tablet 40 mg    senna-docusate 8.6-50 mg per tablet 1 tablet    sertraline tablet 100 mg    sodium chloride 0.9% flush 10 mL     Facility-Administered Medications Ordered in Other Encounters   Medication    fentaNYL injection 25 mcg    haloperidol lactate injection 0.5 mg    HYDROmorphone injection 0.2 mg    ondansetron injection 4 mg    sodium chloride 0.9% flush 10 mL     Family History       Problem Relation (Age of Onset)    Cataracts Mother, Father    Clotting disorder Brother    Kidney disease Mother, Father    Macular degeneration Maternal Grandmother    No Known Problems Daughter, Son, Sister,  "Maternal Aunt, Maternal Uncle, Paternal Aunt, Paternal Uncle, Maternal Grandfather, Paternal Grandmother, Paternal Grandfather          Tobacco Use    Smoking status: Former     Current packs/day: 0.00     Average packs/day: 0.5 packs/day for 40.0 years (20.0 ttl pk-yrs)     Types: Cigarettes     Start date: 1976     Quit date: 2016     Years since quittin.0     Passive exposure: Past    Smokeless tobacco: Never   Substance and Sexual Activity    Alcohol use: Yes     Alcohol/week: 1.0 standard drink of alcohol     Types: 1 Glasses of wine per week     Comment: rare- holiday    Drug use: No    Sexual activity: Not Currently     Partners: Male     Comment:      ROS  Constitutional: negative for fevers or chills  Eyes: negative visual changes or eye discharge  ENT: negative for ear pain or sore throat  Respiratory: negative for shortness of breath or cough  Cardiovascular: negative for chest pain or palpitations  Gastrointestinal: negative for abdominal pain, nausea, or vomiting  Genitourinary: negative for dysuria and flank pain  Neurological: negative for headaches or dizziness  Musculoskeletal: see HPI    Objective:     Vital Signs (Most Recent):  Temp: 98.1 °F (36.7 °C) (24)  Pulse: 80 (24)  Resp: 18 (24)  BP: 127/62 (24)  SpO2: (!) 94 % (24) Vital Signs (24h Range):  Temp:  [97.6 °F (36.4 °C)-98.2 °F (36.8 °C)] 98.1 °F (36.7 °C)  Pulse:  [73-92] 80  Resp:  [18-19] 18  SpO2:  [91 %-99 %] 94 %  BP: (127-178)/(60-99) 127/62     Weight: 92.8 kg (204 lb 9.4 oz)  Height: 5' 2" (157.5 cm)  Body mass index is 37.42 kg/m².      Intake/Output Summary (Last 24 hours) at 2024  Last data filed at 2024 1637  Gross per 24 hour   Intake 256.72 ml   Output --   Net 256.72 ml        Ortho/SPM Exam  Physical Exam:  General:  no apparent distress, WDWN  HENT:  NCAT, Bilateral ears/eyes normal  CV:  Normal pulses, color, and cap refill  Lungs:  " Normal respiratory effort  Neuro: No FND, awake, alert  Psych:  Oriented to Person, Place, Time, and Situation    MSK:       BUE:  Inspection: Skin intact throughout, no swelling, no effusions, no ecchymosis   Palpation: Non-TTP throughout, no palpable abnormality.   ROM: AROM and PROM of the shoulder, elbow, wrist, and hand intact without pain.   Neuro: AIN/PIN/Radial/Median/Ulnar Nerves assessed in isolation without deficit.   SILT throughout.    Vascular: Radial artery palpated 2+. Capillary refill <3s.         RLE:  Inspection: Skin intact throughout, no swelling, no effusions, no ecchymosis   Palpation: Non-TTP throughout. No palpable abnormality.   ROM: AROM and PROM of the hip, knee, ankle, and foot intact without pain.   Neuro: TA/EHL/Gastroc/FHL assessed in isolation without deficit.   SILT throughout.    Vascular: Foot is WWP. Capillary refill <3s.         LLE:  Inspection: Skin intact throughout, no open wounds  Mild swelling noted on left knee compared to right  No ecchymosis    Palpation: TTP throughout hip and knee, otherwise non-TTP throughout  Pain with log roll   Compartments soft and compressible.   ROM: Painless ROM ankle    Neuro: TA/Gastroc/EHL/FHL assessed in isolation without deficit.   SILT Sa/Shukla/DP/SP/T nerve distributions   Vascular: DP artery palpated 2+. Capillary refill <3s.        Spine/pelvis/axial body:  No tenderness to palpation of cervical, thoracic, or lumbar spine  Stable and without pain with direct anterior pressure over ASIS.  No decubitus ulcers         Significant Labs: All pertinent labs within the past 24 hours have been reviewed.    Significant Imaging: I have reviewed and interpreted all pertinent imaging results/findings.  XR left hip & CT left hip: oblique osteolytic lesion w/ cortical destruction of the left intertrochanteric region of femur    XR left knee: chondrocalcinosis noted; adequate joint space; no evidence of fracture or dislocation    Assessment/Plan:     *  Left hip pain  Hannah Montoya is a 68 y.o. female with PMH of non-small cell lung cancer (treated w/ chemo & radiation), chronic liver disease, diabetes, hypertension, hyperlipidemia, COPD, radiation pneumonitis, breast cancer, dilated cardiomyopathy, and pacemaker placement presenting as a transfer from [facility] for orthopaedic oncology evaluation after presenting for 3 week history of pain in the left hip radiating to left knee that seems to be worsening. Imaging of the left hip (XR & CT) with oblique osteolytic lesion w/ cortical destruction of the left intertrochanteric region of femur.     Plan for prophylactic operative fixation of left femur and biopsy of lytic lesion on 8/20/24.    -Admit to medicine service for pre-operative optimization  -Pain: multimodal regimen  -DVT Ppx: hold chemical, FCD's at all times  - Mirel's score of 12, lytic lesion, >2/3 cortical destruction, functional pain at the peritrochanteric region  - NWB to LLE at this time  -Abx: preop abx ordered  -Carrillo  -NPO midnight      Informed consent was obtained, patient marked, and case booked   Pre-operative labs:  Lab Results   Component Value Date    HGB 9.5 (L) 08/19/2024     08/19/2024    CREATININE 1.0 08/19/2024     (H) 08/19/2024      Lab Results   Component Value Date    INR 1.0 08/19/2024        Extensive discussion was held regarding the severity of the patient's condition.  The risks/benefits/alternatives to operative management were explained, with risks including but not limited to: infection, bleeding, pain, stiffness, nerve/vascular injury, heterotopic ossification, hardware failure, DVT/PE, even death. They express full understanding and wish to proceed with surgery.  No guarantees were implied or stated, and all questions were answered.                Lauren Peters MD  Orthopedics  [facility] - Surgery

## 2024-08-20 NOTE — PROGRESS NOTES
[facility] - [facility]  Orthopedics  Progress Note    Patient Name: Hannah Montoya  MRN: [id number]  Admission Date: 8/18/2024  Hospital Length of Stay: 0 days  Attending Provider: Penelope Joseph MD  Primary Care Provider: Emanuel Chavez MD  Follow-up For: Procedure(s) (LRB):  INSERTION, INTRAMEDULLARY ANTOINE, FEMUR + bone biopsy - Callie table, supine, Synthes TFN blade + cement. Pituitary roungeur for biopsy. (Left)    Post-Operative Day:    Subjective:     Principal Problem:Left hip pain    Principal Orthopedic Problem: Osteolytic lesion of left proximal femur    Interval History: NAEON. VSS. AF. Patient states that pain is well controlled. Carrillo in place, urinalysis clear of nitrites, leukocyte esterase, or bacteria. Hgb 9.2 this morning, she has had nothing to eat or drink since midnight. Remain NPO    Plan for OR today.       Review of patient's allergies indicates:   Allergen Reactions    Taxol [paclitaxel]      Hypersensitivity reaction to taxol, symptoms included shortness of breath, nausea, dizziness, flushing     Carboplatin Other (See Comments)     Itching and hives    Adhesive Rash     tegaderm burns and blistered skin       Current Facility-Administered Medications   Medication    0.9%  NaCl infusion    acetaminophen tablet 1,000 mg    aluminum-magnesium hydroxide-simethicone 200-200-20 mg/5 mL suspension 30 mL    amLODIPine tablet 5 mg    atorvastatin tablet 10 mg    benzonatate capsule 200 mg    bisacodyL suppository 10 mg    buPROPion TB24 tablet 150 mg    cetirizine tablet 10 mg    dextrose 10% bolus 125 mL 125 mL    dextrose 10% bolus 250 mL 250 mL    gabapentin capsule 300 mg    glucagon (human recombinant) injection 1 mg    glucose chewable tablet 16 g    glucose chewable tablet 24 g    insulin aspart U-100 pen 0-10 Units    losartan tablet 100 mg    melatonin tablet 6 mg    methocarbamoL tablet 500 mg    metoprolol succinate (TOPROL-XL) 24 hr tablet 100 mg    morphine injection 2 mg     "multivitamin tablet    naloxone 0.4 mg/mL injection 0.02 mg    ondansetron injection 4 mg    oxyCODONE immediate release tablet 10 mg    oxyCODONE immediate release tablet 5 mg    pantoprazole EC tablet 40 mg    senna-docusate 8.6-50 mg per tablet 1 tablet    sertraline tablet 100 mg    sodium chloride 0.9% flush 10 mL    vitamin D 1000 units tablet 2,000 Units     Facility-Administered Medications Ordered in Other Encounters   Medication    fentaNYL injection 25 mcg    haloperidol lactate injection 0.5 mg    HYDROmorphone injection 0.2 mg    ondansetron injection 4 mg    sodium chloride 0.9% flush 10 mL     Objective:     Vital Signs (Most Recent):  Temp: 97.7 °F (36.5 °C) (08/20/24 0530)  Pulse: 79 (08/20/24 0530)  Resp: 18 (08/20/24 0530)  BP: (!) 145/68 (08/20/24 0530)  SpO2: 95 % (08/20/24 0530) Vital Signs (24h Range):  Temp:  [97 °F (36.1 °C)-98.2 °F (36.8 °C)] 97.7 °F (36.5 °C)  Pulse:  [72-84] 79  Resp:  [18] 18  SpO2:  [91 %-99 %] 95 %  BP: (127-156)/(60-75) 145/68     Weight: 92.8 kg (204 lb 9.4 oz)  Height: 5' 2" (157.5 cm)  Body mass index is 37.42 kg/m².      Intake/Output Summary (Last 24 hours) at 8/20/2024 0547  Last data filed at 8/19/2024 1637  Gross per 24 hour   Intake 256.72 ml   Output --   Net 256.72 ml        Ortho/SPM Exam     MSK:          BUE:  Inspection: Skin intact throughout, no swelling, no effusions, no ecchymosis   Palpation: Non-TTP throughout, no palpable abnormality.   ROM: AROM and PROM of the shoulder, elbow, wrist, and hand intact without pain.   Neuro: AIN/PIN/Radial/Median/Ulnar Nerves assessed in isolation without deficit.   SILT throughout.    Vascular: Radial artery palpated 2+. Capillary refill <3s.           RLE:  Inspection: Skin intact throughout, no swelling, no effusions, no ecchymosis   Palpation: Non-TTP throughout. No palpable abnormality.   ROM: AROM and PROM of the hip, knee, ankle, and foot intact without pain.   Neuro: TA/EHL/Gastroc/FHL assessed in " isolation without deficit.   SILT throughout.    Vascular: Foot is WWP. Capillary refill <3s.           LLE:  Inspection: Skin intact throughout, no open wounds  Mild swelling noted on left knee compared to right  No ecchymosis    Palpation: TTP throughout hip and knee, otherwise non-TTP throughout  Pain with log roll   Compartments soft and compressible.   ROM: Painless ROM ankle    Neuro: TA/Gastroc/EHL/FHL assessed in isolation without deficit.   SILT Sa/Shukla/DP/SP/T nerve distributions   Vascular: DP artery palpated 2+. Capillary refill <3s.         Spine/pelvis/axial body:  No tenderness to palpation of cervical, thoracic, or lumbar spine  Stable and without pain with direct anterior pressure over ASIS.  No decubitus ulcers    Significant Labs: CBC:   Recent Labs   Lab 08/19/24  0436 08/20/24  0240   WBC 5.98 6.01   HGB 9.5* 9.2*   HCT 32.4* 31.5*    262     CMP:   Recent Labs   Lab 08/19/24  0436 08/20/24  0240    136   K 4.2 4.8    103   CO2 26 25   * 162*   BUN 20 26*   CREATININE 1.0 1.1   CALCIUM 10.7* 10.4   PROT 7.3 6.9   ALBUMIN 3.1* 2.8*   BILITOT 0.2 0.3   ALKPHOS 63 62   AST 17 14   ALT 10 10   ANIONGAP 12 8     Coagulation:   Recent Labs   Lab 08/19/24  0446 08/19/24  1904   LABPROT 11.0 10.9   INR 1.0 1.0   APTT 29.8  --      All pertinent labs within the past 24 hours have been reviewed.    Significant Imaging: I have reviewed and interpreted all pertinent imaging results/findings.  Assessment/Plan:     * Left hip pain  Hannah Montoya is a 68 y.o. female with PMH of non-small cell lung cancer (treated w/ chemo & radiation), chronic liver disease, diabetes, hypertension, hyperlipidemia, COPD, radiation pneumonitis, breast cancer, dilated cardiomyopathy, and pacemaker placement presenting as a transfer from [facility] for orthopaedic oncology evaluation after presenting for 3 week history of pain in the left hip radiating to left knee that seems to be  worsening. Imaging of the left hip (XR & CT) with oblique osteolytic lesion w/ cortical destruction of the left intertrochanteric region of femur.     Plan for prophylactic operative fixation of left femur and biopsy of lytic lesion on 8/20/24.    -Admit to medicine service for pre-operative optimization  -Pain: multimodal regimen  -DVT Ppx: hold chemical, FCD's at all times  - Mirel's score of 12, lytic lesion, >2/3 cortical destruction, functional pain at the peritrochanteric region  - NWB to LLE at this time  -Abx: preop abx ordered  -Carrillo  -remain NPO      Informed consent was obtained, patient marked, and case booked   Pre-operative labs:  Lab Results   Component Value Date    HGB 9.2 (L) 08/20/2024     08/20/2024    CREATININE 1.1 08/20/2024     (H) 08/20/2024      Lab Results   Component Value Date    INR 1.0 08/19/2024        Extensive discussion was held regarding the severity of the patient's condition.  The risks/benefits/alternatives to operative management were explained, with risks including but not limited to: infection, bleeding, pain, stiffness, nerve/vascular injury, heterotopic ossification, hardware failure, DVT/PE, even death. They express full understanding and wish to proceed with surgery.  No guarantees were implied or stated, and all questions were answered.                    GLEN MELO MD  Orthopedics  [facility] - Surgery

## 2024-08-20 NOTE — PLAN OF CARE
Hospital Medicine Acceptance Note    Date of Admit: 8/18/2024  Date of Transfer / Stepdown: 8/19/2024  Transferring Facility: [facility]  Accepting  team: [facility]    Brief History of Present Illness:      68-year-old female with a history of non-small-cell lung cancer (treated with chemotherapy and radiation therapy), chronic liver disease, diabetes, hypertension, hyperlipidemia, COPD, radiation pneumonitis, breast cancer, dilated cardiomyopathy, and CRT-D placement admitted to [facility] on August 18 with worsening pain to her left hip radiating to her left knee.  Pain has been bothering her for a while but has progressively worsened.  She was walking recently when she heard a loud pop and developed excruciating pain in her left leg.  She was found to have osteolytic lesion with beginning cortical destruction in the left hip intertrochanteric region.  She was seen by Orthopedic Surgery, and she was noted to have intact peripheral pulses and intact sensation.  Recommendation was for transfer for Orthopedic Surgery/Orthopedic Oncology evaluation.  Transfer center contacted Orthopedic Surgery at [facility].  Will plan transfer to Hospital Medicine at [facility] for further evaluation of the femur lesion.  Referring provider noted patient is awake and alert.  She has pain in the hip area.  Oxygenation fluctuates, but she has no respiratory distress.     August 19:  INR 1, magnesium 1.4, phosphorus 3.1, sodium 143, potassium 4.2, chloride 105, CO2 26, BUN 20, creatinine 1, glucose 132, calcium 10.7, AST 17, ALT 10, white blood cells 5.98, hemoglobin 9.5, hematocrit 32.4, platelets 269     August 18: CT of the left hip note expansile osteolytic lesion with beginning cortical destruction is seen intertrochanteric region.  No hip dislocation.  No displaced fracture.  Degenerative change seen in the visualized lower lumbar spine.     July 31: CT head with and without contrast had no evidence  of intracranial metastasis.  Stable appearing encephalomalacia at the right parietal and posterior temporal lobes.  -CT chest, abdomen, and pelvis noted patient with known history of non-small-cell lung cancer.  Continued degree of right lower lobe volume loss/collapse with pleural fluid.  Some increased soft tissue attenuation about the right medial lower lobe segmental arterial branch with a mild degree of narrowing.     VS:  Temperature 97.9°, pulse 78, respirations 18, blood pressure 128/60, O2 sats 91-99% on   Assessment on arrival   -Pt. Reports some persistent L hip pain, improved with pain meds. No other complaints. In no acute distress      Plan   -Orthopedic surgery consulted, plan for prophylactic operative fixation of left femur and biopsy of lytic lesion on 8/20/24. Hip fracture pathway ordered  -Continue to f/u with oncology as outpatient. Consider inpatient consult if any treatment issues arise      Preoperative Cardiac evaluation  Cardiovascular Risk Assessment:  Non-emergent surgery.  No active cardiac problems (such as unstable angina, decompensated heart failure, significant uncontrolled arrhythmias or severe valvular disease).  Intermediate risk surgery.  Functional Status: She IS NOT able to climb a flight of stairs (> 4 METS) with no CP or SOB  Her revised cardiac risk index is 3.     1 pt Each: Ischemic Heart Disease, Cerebrovascular Disease,                     CHF, DM, Creatinine > 2           Other Issues: Metastatic lung Ca with chronic lung effusion. She is a chronically ill patient, but she is currently compensated, no further cardiac testing/medical optimization recommended prior to surgery      Stephen Lara MD  Hospital Medicine Staff  [phone] pager

## 2024-08-20 NOTE — ASSESSMENT & PLAN NOTE
Hannah Montoya is a 68 y.o. female with PMH of non-small cell lung cancer (treated w/ chemo & radiation), chronic liver disease, diabetes, hypertension, hyperlipidemia, COPD, radiation pneumonitis, breast cancer, dilated cardiomyopathy, and pacemaker placement presenting as a transfer from [facility] for orthopaedic oncology evaluation after presenting for 3 week history of pain in the left hip radiating to left knee that seems to be worsening. Imaging of the left hip (XR & CT) with oblique osteolytic lesion w/ cortical destruction of the left intertrochanteric region of femur.     Plan for prophylactic operative fixation of left femur and biopsy of lytic lesion on 8/20/24.    -Admit to medicine service for pre-operative optimization  -Pain: multimodal regimen  -DVT Ppx: hold chemical, FCD's at all times  - Mirel's score of 12, lytic lesion, >2/3 cortical destruction, functional pain at the peritrochanteric region  - NWB to LLE at this time  -Abx: preop abx ordered  -Carrillo  -remain NPO      Informed consent was obtained, patient marked, and case booked   Pre-operative labs:  Lab Results   Component Value Date    HGB 9.2 (L) 08/20/2024     08/20/2024    CREATININE 1.1 08/20/2024     (H) 08/20/2024      Lab Results   Component Value Date    INR 1.0 08/19/2024        Extensive discussion was held regarding the severity of the patient's condition.  The risks/benefits/alternatives to operative management were explained, with risks including but not limited to: infection, bleeding, pain, stiffness, nerve/vascular injury, heterotopic ossification, hardware failure, DVT/PE, even death. They express full understanding and wish to proceed with surgery.  No guarantees were implied or stated, and all questions were answered.

## 2024-08-20 NOTE — SUBJECTIVE & OBJECTIVE
Past Medical History:   Diagnosis Date    AICD (automatic cardioverter/defibrillator) present     Asthma     bronchitis in past    Breast cancer 2016    right    Cardiac pacemaker     Cardiomyopathy     COPD (chronic obstructive pulmonary disease)     Diabetes mellitus, type 2     Hyperglycemia     Hyperlipidemia     Hypertension     Malignant neoplasm of overlapping sites of female breast 2016    Nuclear sclerosis of both eyes 2020    Respiratory distress 3/12/2020       Past Surgical History:   Procedure Laterality Date    BIOPSY, WITH CT GUIDANCE Right 2023    Procedure: LUNG MASS BIOPSY, WITH CT GUIDANCE;  Surgeon: Yehuda Green MD;  Location: [facility] CATH LAB;  Service: Radiology;  Laterality: Right;    BREAST BIOPSY Right 2016    IDC    BREAST LUMPECTOMY Right     CARDIAC CATHETERIZATION Bilateral 2017    CARDIAC DEFIBRILLATOR PLACEMENT Left 08/10/2017    CARDIAC DEFIBRILLATOR PLACEMENT Left 2018     SECTION      x2    CHOLECYSTECTOMY      INSERTION OF TUNNELED CENTRAL VENOUS CATHETER (CVC) WITH SUBCUTANEOUS PORT Right 2020    Procedure: LCENYNURO-NYJQ-T-CATH, RIGHT;  Surgeon: Josefa Caceres MD;  Location: [address];  Service: General;  Laterality: Right;  Port-a-cath placed to R. IJ    LUNG BIOPSY N/A 2020    Procedure: BIOPSY, LUNG;  Surgeon: [facility] Diagnostic Provider;  Location: [facility] OR;  Service: Radiology;  Laterality: N/A;  8AM START  RN PREOP 2020---COVID NEGATIVE    NAVIGATIONAL BRONCHOSCOPY N/A 10/13/2020    Procedure: BRONCHOSCOPY, NAVIGATIONAL;  Surgeon: Jamilah Weaver MD;  Location: [address];  Service: Pulmonary;  Laterality: N/A;    REVISION OF IMPLANTABLE CARDIOVERTER-DEFIBRILLATOR (ICD) ELECTRODE LEAD PLACEMENT N/A 6/15/2018    Procedure: REVISION-LEAD-ICD;  Surgeon: Javy Gates MD;  Location: [facility] CATH LAB;  Service: Cardiology;  Laterality: N/A;  LBBB, Bi-V ICD HIS Elmo jordan, MDT, Choice, MB, 3 Prep     ROBOT-ASSISTED LAPAROSCOPIC LYMPHADENECTOMY USING DA SALVADOR XI Right 10/23/2020    Procedure: XI ROBOTIC LYMPHADENECTOMY;  Surgeon: Ramo Tucker MD;  Location: [facility] OR [address] [place] [address];  Service: Thoracic;  Laterality: Right;    TUBAL LIGATION         Review of patient's allergies indicates:   Allergen Reactions    Taxol [paclitaxel]      Hypersensitivity reaction to taxol, symptoms included shortness of breath, nausea, dizziness, flushing     Carboplatin Other (See Comments)     Itching and hives    Adhesive Rash     tegaderm burns and blistered skin       Current Facility-Administered Medications   Medication    acetaminophen tablet 650 mg    aluminum-magnesium hydroxide-simethicone 200-200-20 mg/5 mL suspension 30 mL    amLODIPine tablet 5 mg    atorvastatin tablet 10 mg    benzonatate capsule 200 mg    buPROPion TB24 tablet 150 mg    cetirizine tablet 10 mg    dextrose 10% bolus 125 mL 125 mL    dextrose 10% bolus 250 mL 250 mL    enoxaparin injection 40 mg    gabapentin capsule 300 mg    glucagon (human recombinant) injection 1 mg    glucose chewable tablet 16 g    glucose chewable tablet 24 g    HYDROmorphone injection 0.5 mg    insulin aspart U-100 pen 0-10 Units    losartan tablet 100 mg    melatonin tablet 6 mg    metoprolol succinate (TOPROL-XL) 24 hr tablet 100 mg    multivitamin tablet    naloxone 0.4 mg/mL injection 0.02 mg    ondansetron injection 4 mg    pantoprazole EC tablet 40 mg    senna-docusate 8.6-50 mg per tablet 1 tablet    sertraline tablet 100 mg    sodium chloride 0.9% flush 10 mL     Facility-Administered Medications Ordered in Other Encounters   Medication    fentaNYL injection 25 mcg    haloperidol lactate injection 0.5 mg    HYDROmorphone injection 0.2 mg    ondansetron injection 4 mg    sodium chloride 0.9% flush 10 mL     Family History       Problem Relation (Age of Onset)    Cataracts Mother, Father    Clotting disorder Brother    Kidney disease Mother, Father    Macular degeneration  "Maternal Grandmother    No Known Problems Daughter, Son, Sister, Maternal Aunt, Maternal Uncle, Paternal Aunt, Paternal Uncle, Maternal Grandfather, Paternal Grandmother, Paternal Grandfather          Tobacco Use    Smoking status: Former     Current packs/day: 0.00     Average packs/day: 0.5 packs/day for 40.0 years (20.0 ttl pk-yrs)     Types: Cigarettes     Start date: 1976     Quit date: 2016     Years since quittin.0     Passive exposure: Past    Smokeless tobacco: Never   Substance and Sexual Activity    Alcohol use: Yes     Alcohol/week: 1.0 standard drink of alcohol     Types: 1 Glasses of wine per week     Comment: rare- holiday    Drug use: No    Sexual activity: Not Currently     Partners: Male     Comment:      ROS  Constitutional: negative for fevers or chills  Eyes: negative visual changes or eye discharge  ENT: negative for ear pain or sore throat  Respiratory: negative for shortness of breath or cough  Cardiovascular: negative for chest pain or palpitations  Gastrointestinal: negative for abdominal pain, nausea, or vomiting  Genitourinary: negative for dysuria and flank pain  Neurological: negative for headaches or dizziness  Musculoskeletal: see HPI    Objective:     Vital Signs (Most Recent):  Temp: 98.1 °F (36.7 °C) (24)  Pulse: 80 (24)  Resp: 18 (24)  BP: 127/62 (24)  SpO2: (!) 94 % (24) Vital Signs (24h Range):  Temp:  [97.6 °F (36.4 °C)-98.2 °F (36.8 °C)] 98.1 °F (36.7 °C)  Pulse:  [73-92] 80  Resp:  [18-19] 18  SpO2:  [91 %-99 %] 94 %  BP: (127-178)/(60-99) 127/62     Weight: 92.8 kg (204 lb 9.4 oz)  Height: 5' 2" (157.5 cm)  Body mass index is 37.42 kg/m².      Intake/Output Summary (Last 24 hours) at 2024  Last data filed at 2024 1637  Gross per 24 hour   Intake 256.72 ml   Output --   Net 256.72 ml        Ortho/SPM Exam  Physical Exam:  General:  no apparent distress, WDWN  HENT:  NCAT, Bilateral " ears/eyes normal  CV:  Normal pulses, color, and cap refill  Lungs:  Normal respiratory effort  Neuro: No FND, awake, alert  Psych:  Oriented to Person, Place, Time, and Situation    MSK:       BUE:  Inspection: Skin intact throughout, no swelling, no effusions, no ecchymosis   Palpation: Non-TTP throughout, no palpable abnormality.   ROM: AROM and PROM of the shoulder, elbow, wrist, and hand intact without pain.   Neuro: AIN/PIN/Radial/Median/Ulnar Nerves assessed in isolation without deficit.   SILT throughout.    Vascular: Radial artery palpated 2+. Capillary refill <3s.         RLE:  Inspection: Skin intact throughout, no swelling, no effusions, no ecchymosis   Palpation: Non-TTP throughout. No palpable abnormality.   ROM: AROM and PROM of the hip, knee, ankle, and foot intact without pain.   Neuro: TA/EHL/Gastroc/FHL assessed in isolation without deficit.   SILT throughout.    Vascular: Foot is WWP. Capillary refill <3s.         LLE:  Inspection: Skin intact throughout, no open wounds  Mild swelling noted on left knee compared to right  No ecchymosis    Palpation: TTP throughout hip and knee, otherwise non-TTP throughout  Pain with log roll   Compartments soft and compressible.   ROM: Painless ROM ankle    Neuro: TA/Gastroc/EHL/FHL assessed in isolation without deficit.   SILT Sa/Shukla/DP/SP/T nerve distributions   Vascular: DP artery palpated 2+. Capillary refill <3s.        Spine/pelvis/axial body:  No tenderness to palpation of cervical, thoracic, or lumbar spine  Stable and without pain with direct anterior pressure over ASIS.  No decubitus ulcers         Significant Labs: All pertinent labs within the past 24 hours have been reviewed.    Significant Imaging: I have reviewed and interpreted all pertinent imaging results/findings.  XR left hip & CT left hip: oblique osteolytic lesion w/ cortical destruction of the left intertrochanteric region of femur    XR left knee: chondrocalcinosis noted; adequate joint  space; no evidence of fracture or dislocation

## 2024-08-20 NOTE — PROGRESS NOTES
Patient arrived to PACU without L SIFI perineural cathter in place. Perineural catheter charted. Garth Jurado MD with anesthesia at bedside to assess the patient for SIFI catheter. No catheter present. MD to update surgical team. WCTM.

## 2024-08-20 NOTE — ASSESSMENT & PLAN NOTE
01/14/20      Kianna Stone  [id number] [address]    Dear Kianna,    We look forward to seeing you on 02/04/2020 for your procedure.    To better prepare you, please observe the following:     Preparing for GI Procedure  ? Please schedule an appointment within 30 days of surgery with your primary care doctor for a history and physical. They will perform any required labs or tests before surgery. If your doctor is not an [facility] physician, please ask them to fax the H&P and test results to [phone].      You will receive a call from our Pre-Admission Testing department prior to your surgical procedure. This call is necessary to get important information about your health history, to ensure you are properly prepared for surgery, and minimize the chance of having your procedure delayed or rescheduled.    WHAT TO DO ABOUT YOUR PRESCRIPTION MEDICATIONS  You must continue taking all your previously prescribed medications as directed unless specifically told not to by your doctor, or if you find them on the list below    STOP THESE MEDICATIONS   • Aspirin stop 3 days prior unless instructed otherwise by your doctor or Cardiologist.   • Erectile dysfunction medications stop 2 days prior    • Herbal medications/ Vitamins/ Fish Oil stop 7 days prior unless otherwise directed by your doctor.    • Phentermine stop 7 days prior   • Selegiline patches stop 21 days prior   Your doctor will have given you instructions about modifying your Insulin regimen 1 day prior.  If on a blood thinner, ask your doctor, Cardiologist and/or surgeon if and when it should be stopped.    You will be contacted by phone or email by a company called [employer]. This is an online educational platform that will provide education regarding your procedure. Please take the time to review this video.    Day before Procedure  Please follow any specific instructions given to you regarding any prep needed related to your  - sees dr raza with heme/onc, dx 2021, has been on multiple regmiens per notes, RLL, hilar adenopathy known. Had more hip pain recently and had scans showing likely progression with this lesion that now is showing impending fx that she is getting fixed with 8/20 today  Messaged dr raza 8/20 about surgery today, plan for immunotherapy that doesn't need to hold as doesn't cause wound healing issues per him, but may have to hold if at post acute care     procedure.    Day of Procedure  Take your normal morning medications with a sip of water first thing in the morning prior to surgery, except those medications your doctor or our Pre-Admission Testing office has specifically told you not to take.     If you choose, for your convenience we have an outpatient pharmacy on-site and can arrange to have your discharge prescription filled before you leave, to save you a trip. You may want to bring enough money to cover the cost of this prescription.      Each patient that comes through the GI department is given individualized care and attention. The doctors and nurses spend the time necessary with each patient to ensure the best care. Sometimes, this can cause delays. You may want to bring a book, puzzle or music player to keep you busy if you experience a delay.       Procedures usually involve giving medications that put you to sleep or ease anxiety. For this reason, you must have a ride home from the hospital and someone (over age 16) should stay with you for the first 24 hours after your procedure. The procedure will be canceled if you do not have a responsible person with you. You are not allowed to drive after receiving sedation of any type.     For the comfort and privacy of all our patients before and after your procedure, we strongly discourage bringing small children as guests and only allow two adult visitors in the procedural area at any given time.     Bring a photo ID, your insurance card and any copay due at time of service. Leave all valuables at home. Do not wear jewelry, makeup, body lotion, or fingernail polish.        If you have any questions or concerns before the day of your procedure, please call [phone] to have them answered.     After your procedure you may get a survey in the mail. Please take the opportunity to fill it out letting us know what we did well or what we could improve on. Our goal is to provide our patients with the best  possible care and we couldn’t do it without your input.     We look forward to seeing you,  Your GI Services Team

## 2024-08-20 NOTE — NURSING
Nurses Note -- 4 Eyes      8/20/2024   9:47 AM      Skin assessed during: Q Shift Change      [x] No Altered Skin Integrity Present    []Prevention Measures Documented      [] Yes- Altered Skin Integrity Present or Discovered   [] LDA Added if Not in Epic (Describe Wound)   [] New Altered Skin Integrity was Present on Admit and Documented in LDA   [] Wound Image Taken    Wound Care Consulted? No    Attending Nurse:  Thuy Mccain lpn    Second RN/Staff Member:   ceci ROBERTSON

## 2024-08-20 NOTE — ANESTHESIA PROCEDURE NOTES
L SIFI [facility]    Patient location during procedure: pre-op   Block not for primary anesthetic.  Reason for block: at surgeon's request and post-op pain management   Post-op Pain Location: L Leg pain   Start time: 8/20/2024 11:15 AM  Timeout: 8/20/2024 11:15 AM   End time: 8/20/2024 11:30 AM    Staffing  Authorizing Provider: Yehuda Monaco MD  Performing Provider: Javan Adair DO    Staffing  Performed by: Javan Adair DO  Authorized by: Yehuda Monaco MD    Preanesthetic Checklist  Completed: patient identified, IV checked, site marked, risks and benefits discussed, surgical consent, monitors and equipment checked, pre-op evaluation and timeout performed  Peripheral Block  Patient position: supine  Prep: ChloraPrep and site prepped and draped  Patient monitoring: heart rate, cardiac monitor, continuous pulse ox, continuous capnometry and frequent blood pressure checks  Block type: fascia iliaca  Laterality: left  Injection technique: continuous  Needle  Needle type: Tuohy   Needle gauge: 17 G  Needle length: 3.5 in  Needle localization: anatomical landmarks and ultrasound guidance  Catheter type: spring wound  Catheter size: 19 G  Test dose: lidocaine 1.5% with Epi 1-to-200,000 and negative   -ultrasound image captured on disc.  Assessment  Injection assessment: negative aspiration, negative parasthesia and local visualized surrounding nerve  Paresthesia pain: none  Heart rate change: no  Slow fractionated injection: yes  Pain Tolerance: comfortable throughout block and no complaints  Medications:    Medications: ropivacaine (NAROPIN) injection 0.5% - Perineural   15 mL - 8/20/2024 11:30:00 AM    Additional Notes  A time out was conducted. Site gm confirmed with team and patient. Allergies reviewed.   Vital signs stable throughout block. RN monitoring vitals throughout.   Needle advanced under continuous ultrasound guidance.  Local injected incrementally after confirming negative aspiration. No signs or symptoms  of intravascular or intraneural injection noted.   No persistent paresthesias elicited or expressed. Patient tolerated procedure well.  30mL of 0.25% Ropivacaine with epinephrine 1:300K used for the block.  VSS.  DOSC RN monitoring vitals throughout procedure.  Patient tolerated procedure well.

## 2024-08-20 NOTE — ASSESSMENT & PLAN NOTE
Patient has Abnormal Magnesium: hypomagnesemia. Will continue to monitor electrolytes closely. Will replace the affected electrolytes and repeat labs to be done after interventions completed. The patient's magnesium results have been reviewed and are listed below.  Recent Labs   Lab 08/20/24  0240   MG 1.5*

## 2024-08-20 NOTE — TRANSFER OF CARE
"Anesthesia Transfer of Care Note    Patient: Hannah Montoya    Procedure(s) Performed: Procedure(s) (LRB):  INSERTION, INTRAMEDULLARY ANTOINE, FEMUR (Left)  BIOPSY, FEMUR (Left)    Patient location: PACU    Anesthesia Type: general    Transport from OR: Transported from OR on 6-10 L/min O2 by face mask with adequate spontaneous ventilation    Post pain: adequate analgesia    Post assessment: no apparent anesthetic complications    Post vital signs: stable    Level of consciousness: awake and sedated    Complications: none    Transfer of care protocol was followed      Last vitals: Visit Vitals  BP (!) 111/57 (BP Location: Left arm, Patient Position: Lying)   Pulse 70   Temp 36.1 °C (97 °F) (Temporal)   Resp 16   Ht 5' 2" (1.575 m)   Wt 92.8 kg (204 lb 9.4 oz)   LMP  (LMP Unknown)   SpO2 100%   Breastfeeding No   BMI 37.42 kg/m²     "

## 2024-08-20 NOTE — SUBJECTIVE & OBJECTIVE
Principal Problem:Left hip pain    Principal Orthopedic Problem: Osteolytic lesion of left proximal femur    Interval History: NAEON. VSS. AF. Patient states that pain is well controlled. Carrillo in place, urinalysis clear of nitrites, leukocyte esterase, or bacteria. Hgb 9.2 this morning, she has had nothing to eat or drink since midnight. Remain NPO    Plan for OR today.       Review of patient's allergies indicates:   Allergen Reactions    Taxol [paclitaxel]      Hypersensitivity reaction to taxol, symptoms included shortness of breath, nausea, dizziness, flushing     Carboplatin Other (See Comments)     Itching and hives    Adhesive Rash     tegaderm burns and blistered skin       Current Facility-Administered Medications   Medication    0.9%  NaCl infusion    acetaminophen tablet 1,000 mg    aluminum-magnesium hydroxide-simethicone 200-200-20 mg/5 mL suspension 30 mL    amLODIPine tablet 5 mg    atorvastatin tablet 10 mg    benzonatate capsule 200 mg    bisacodyL suppository 10 mg    buPROPion TB24 tablet 150 mg    cetirizine tablet 10 mg    dextrose 10% bolus 125 mL 125 mL    dextrose 10% bolus 250 mL 250 mL    gabapentin capsule 300 mg    glucagon (human recombinant) injection 1 mg    glucose chewable tablet 16 g    glucose chewable tablet 24 g    insulin aspart U-100 pen 0-10 Units    losartan tablet 100 mg    melatonin tablet 6 mg    methocarbamoL tablet 500 mg    metoprolol succinate (TOPROL-XL) 24 hr tablet 100 mg    morphine injection 2 mg    multivitamin tablet    naloxone 0.4 mg/mL injection 0.02 mg    ondansetron injection 4 mg    oxyCODONE immediate release tablet 10 mg    oxyCODONE immediate release tablet 5 mg    pantoprazole EC tablet 40 mg    senna-docusate 8.6-50 mg per tablet 1 tablet    sertraline tablet 100 mg    sodium chloride 0.9% flush 10 mL    vitamin D 1000 units tablet 2,000 Units     Facility-Administered Medications Ordered in Other Encounters   Medication    fentaNYL injection 25 mcg  "   haloperidol lactate injection 0.5 mg    HYDROmorphone injection 0.2 mg    ondansetron injection 4 mg    sodium chloride 0.9% flush 10 mL     Objective:     Vital Signs (Most Recent):  Temp: 97.7 °F (36.5 °C) (08/20/24 0530)  Pulse: 79 (08/20/24 0530)  Resp: 18 (08/20/24 0530)  BP: (!) 145/68 (08/20/24 0530)  SpO2: 95 % (08/20/24 0530) Vital Signs (24h Range):  Temp:  [97 °F (36.1 °C)-98.2 °F (36.8 °C)] 97.7 °F (36.5 °C)  Pulse:  [72-84] 79  Resp:  [18] 18  SpO2:  [91 %-99 %] 95 %  BP: (127-156)/(60-75) 145/68     Weight: 92.8 kg (204 lb 9.4 oz)  Height: 5' 2" (157.5 cm)  Body mass index is 37.42 kg/m².      Intake/Output Summary (Last 24 hours) at 8/20/2024 0547  Last data filed at 8/19/2024 1637  Gross per 24 hour   Intake 256.72 ml   Output --   Net 256.72 ml        Ortho/SPM Exam     MSK:          BUE:  Inspection: Skin intact throughout, no swelling, no effusions, no ecchymosis   Palpation: Non-TTP throughout, no palpable abnormality.   ROM: AROM and PROM of the shoulder, elbow, wrist, and hand intact without pain.   Neuro: AIN/PIN/Radial/Median/Ulnar Nerves assessed in isolation without deficit.   SILT throughout.    Vascular: Radial artery palpated 2+. Capillary refill <3s.           RLE:  Inspection: Skin intact throughout, no swelling, no effusions, no ecchymosis   Palpation: Non-TTP throughout. No palpable abnormality.   ROM: AROM and PROM of the hip, knee, ankle, and foot intact without pain.   Neuro: TA/EHL/Gastroc/FHL assessed in isolation without deficit.   SILT throughout.    Vascular: Foot is WWP. Capillary refill <3s.           LLE:  Inspection: Skin intact throughout, no open wounds  Mild swelling noted on left knee compared to right  No ecchymosis    Palpation: TTP throughout hip and knee, otherwise non-TTP throughout  Pain with log roll   Compartments soft and compressible.   ROM: Painless ROM ankle    Neuro: TA/Gastroc/EHL/FHL assessed in isolation without deficit.   SILT Sa/Shukla/DP/SP/T nerve " distributions   Vascular: DP artery palpated 2+. Capillary refill <3s.         Spine/pelvis/axial body:  No tenderness to palpation of cervical, thoracic, or lumbar spine  Stable and without pain with direct anterior pressure over ASIS.  No decubitus ulcers    Significant Labs: CBC:   Recent Labs   Lab 08/19/24  0436 08/20/24  0240   WBC 5.98 6.01   HGB 9.5* 9.2*   HCT 32.4* 31.5*    262     CMP:   Recent Labs   Lab 08/19/24  0436 08/20/24  0240    136   K 4.2 4.8    103   CO2 26 25   * 162*   BUN 20 26*   CREATININE 1.0 1.1   CALCIUM 10.7* 10.4   PROT 7.3 6.9   ALBUMIN 3.1* 2.8*   BILITOT 0.2 0.3   ALKPHOS 63 62   AST 17 14   ALT 10 10   ANIONGAP 12 8     Coagulation:   Recent Labs   Lab 08/19/24  0446 08/19/24  1904   LABPROT 11.0 10.9   INR 1.0 1.0   APTT 29.8  --      All pertinent labs within the past 24 hours have been reviewed.    Significant Imaging: I have reviewed and interpreted all pertinent imaging results/findings.

## 2024-08-21 PROBLEM — M89.9 LYTIC BONE LESION OF LEFT FEMUR: Status: ACTIVE | Noted: 2024-08-19

## 2024-08-21 PROBLEM — Z85.3 HISTORY OF BREAST CANCER IN FEMALE: Status: RESOLVED | Noted: 2020-02-03 | Resolved: 2024-08-21

## 2024-08-21 PROBLEM — D62 ACUTE BLOOD LOSS ANEMIA: Status: ACTIVE | Noted: 2024-08-21

## 2024-08-21 PROBLEM — C79.51 MALIGNANT NEOPLASM METASTATIC TO BONE: Status: ACTIVE | Noted: 2024-08-21

## 2024-08-21 PROBLEM — M89.8X5 LYTIC BONE LESION OF LEFT FEMUR: Status: ACTIVE | Noted: 2024-08-19

## 2024-08-21 LAB
ALBUMIN SERPL BCP-MCNC: 2.6 G/DL (ref 3.5–5.2)
ALP SERPL-CCNC: 65 U/L (ref 55–135)
ALT SERPL W/O P-5'-P-CCNC: 13 U/L (ref 10–44)
ANION GAP SERPL CALC-SCNC: 10 MMOL/L (ref 8–16)
AST SERPL-CCNC: 20 U/L (ref 10–40)
BASOPHILS # BLD AUTO: 0.04 K/UL (ref 0–0.2)
BASOPHILS NFR BLD: 0.5 % (ref 0–1.9)
BILIRUB SERPL-MCNC: 0.3 MG/DL (ref 0.1–1)
BUN SERPL-MCNC: 22 MG/DL (ref 8–23)
CALCIUM SERPL-MCNC: 9 MG/DL (ref 8.7–10.5)
CHLORIDE SERPL-SCNC: 101 MMOL/L (ref 95–110)
CO2 SERPL-SCNC: 24 MMOL/L (ref 23–29)
CREAT SERPL-MCNC: 0.9 MG/DL (ref 0.5–1.4)
DIFFERENTIAL METHOD BLD: ABNORMAL
EOSINOPHIL # BLD AUTO: 0 K/UL (ref 0–0.5)
EOSINOPHIL NFR BLD: 0.4 % (ref 0–8)
ERYTHROCYTE [DISTWIDTH] IN BLOOD BY AUTOMATED COUNT: 18 % (ref 11.5–14.5)
EST. GFR  (NO RACE VARIABLE): >60 ML/MIN/1.73 M^2
GLUCOSE SERPL-MCNC: 154 MG/DL (ref 70–110)
HCT VFR BLD AUTO: 29.3 % (ref 37–48.5)
HGB BLD-MCNC: 8.4 G/DL (ref 12–16)
IMM GRANULOCYTES # BLD AUTO: 0.07 K/UL (ref 0–0.04)
IMM GRANULOCYTES NFR BLD AUTO: 1 % (ref 0–0.5)
LYMPHOCYTES # BLD AUTO: 0.6 K/UL (ref 1–4.8)
LYMPHOCYTES NFR BLD: 7.5 % (ref 18–48)
MAGNESIUM SERPL-MCNC: 1.4 MG/DL (ref 1.6–2.6)
MCH RBC QN AUTO: 23.3 PG (ref 27–31)
MCHC RBC AUTO-ENTMCNC: 28.7 G/DL (ref 32–36)
MCV RBC AUTO: 81 FL (ref 82–98)
MONOCYTES # BLD AUTO: 0.7 K/UL (ref 0.3–1)
MONOCYTES NFR BLD: 9 % (ref 4–15)
NEUTROPHILS # BLD AUTO: 6 K/UL (ref 1.8–7.7)
NEUTROPHILS NFR BLD: 81.6 % (ref 38–73)
NRBC BLD-RTO: 0 /100 WBC
PHOSPHATE SERPL-MCNC: 2.7 MG/DL (ref 2.7–4.5)
PLATELET # BLD AUTO: 239 K/UL (ref 150–450)
PMV BLD AUTO: 12.2 FL (ref 9.2–12.9)
POCT GLUCOSE: 162 MG/DL (ref 70–110)
POCT GLUCOSE: 242 MG/DL (ref 70–110)
POCT GLUCOSE: 292 MG/DL (ref 70–110)
POTASSIUM SERPL-SCNC: 4.8 MMOL/L (ref 3.5–5.1)
PROT SERPL-MCNC: 6.5 G/DL (ref 6–8.4)
RBC # BLD AUTO: 3.61 M/UL (ref 4–5.4)
SODIUM SERPL-SCNC: 135 MMOL/L (ref 136–145)
WBC # BLD AUTO: 7.36 K/UL (ref 3.9–12.7)

## 2024-08-21 PROCEDURE — 25000003 PHARM REV CODE 250: Mod: HCNC

## 2024-08-21 PROCEDURE — 97165 OT EVAL LOW COMPLEX 30 MIN: CPT | Mod: HCNC

## 2024-08-21 PROCEDURE — 97116 GAIT TRAINING THERAPY: CPT | Mod: HCNC

## 2024-08-21 PROCEDURE — 99900035 HC TECH TIME PER 15 MIN (STAT): Mod: HCNC

## 2024-08-21 PROCEDURE — 25000242 PHARM REV CODE 250 ALT 637 W/ HCPCS: Mod: HCNC | Performed by: HOSPITALIST

## 2024-08-21 PROCEDURE — 63600175 PHARM REV CODE 636 W HCPCS: Mod: HCNC | Performed by: HOSPITALIST

## 2024-08-21 PROCEDURE — 97161 PT EVAL LOW COMPLEX 20 MIN: CPT | Mod: HCNC

## 2024-08-21 PROCEDURE — 25000003 PHARM REV CODE 250: Mod: HCNC | Performed by: HOSPITALIST

## 2024-08-21 PROCEDURE — 83735 ASSAY OF MAGNESIUM: CPT | Mod: HCNC | Performed by: HOSPITALIST

## 2024-08-21 PROCEDURE — 36415 COLL VENOUS BLD VENIPUNCTURE: CPT | Mod: HCNC | Performed by: HOSPITALIST

## 2024-08-21 PROCEDURE — 63600175 PHARM REV CODE 636 W HCPCS: Mod: HCNC

## 2024-08-21 PROCEDURE — 84100 ASSAY OF PHOSPHORUS: CPT | Mod: HCNC | Performed by: HOSPITALIST

## 2024-08-21 PROCEDURE — 80053 COMPREHEN METABOLIC PANEL: CPT | Mod: HCNC | Performed by: HOSPITALIST

## 2024-08-21 PROCEDURE — 97535 SELF CARE MNGMENT TRAINING: CPT | Mod: HCNC

## 2024-08-21 PROCEDURE — 85025 COMPLETE CBC W/AUTO DIFF WBC: CPT | Mod: HCNC | Performed by: HOSPITALIST

## 2024-08-21 PROCEDURE — 21400001 HC TELEMETRY ROOM: Mod: HCNC

## 2024-08-21 RX ORDER — LANOLIN ALCOHOL/MO/W.PET/CERES
400 CREAM (GRAM) TOPICAL 2 TIMES DAILY
Status: DISCONTINUED | OUTPATIENT
Start: 2024-08-21 | End: 2024-08-23 | Stop reason: HOSPADM

## 2024-08-21 RX ORDER — FLUTICASONE FUROATE AND VILANTEROL 200; 25 UG/1; UG/1
1 POWDER RESPIRATORY (INHALATION) DAILY
Status: DISCONTINUED | OUTPATIENT
Start: 2024-08-21 | End: 2024-08-23 | Stop reason: HOSPADM

## 2024-08-21 RX ORDER — ACETAMINOPHEN 500 MG
1000 TABLET ORAL EVERY 8 HOURS
Status: DISCONTINUED | OUTPATIENT
Start: 2024-08-21 | End: 2024-08-23 | Stop reason: HOSPADM

## 2024-08-21 RX ORDER — CELECOXIB 200 MG/1
200 CAPSULE ORAL DAILY
Status: DISCONTINUED | OUTPATIENT
Start: 2024-08-21 | End: 2024-08-23 | Stop reason: HOSPADM

## 2024-08-21 RX ORDER — METHOCARBAMOL 750 MG/1
750 TABLET, FILM COATED ORAL EVERY 6 HOURS
Status: DISCONTINUED | OUTPATIENT
Start: 2024-08-21 | End: 2024-08-23 | Stop reason: HOSPADM

## 2024-08-21 RX ORDER — IPRATROPIUM BROMIDE AND ALBUTEROL SULFATE 2.5; .5 MG/3ML; MG/3ML
3 SOLUTION RESPIRATORY (INHALATION) EVERY 4 HOURS PRN
Status: DISCONTINUED | OUTPATIENT
Start: 2024-08-21 | End: 2024-08-23 | Stop reason: HOSPADM

## 2024-08-21 RX ORDER — MAGNESIUM SULFATE HEPTAHYDRATE 40 MG/ML
2 INJECTION, SOLUTION INTRAVENOUS ONCE
Status: COMPLETED | OUTPATIENT
Start: 2024-08-21 | End: 2024-08-21

## 2024-08-21 RX ADMIN — METHOCARBAMOL 500 MG: 500 TABLET ORAL at 06:08

## 2024-08-21 RX ADMIN — MAGNESIUM SULFATE HEPTAHYDRATE 2 G: 40 INJECTION, SOLUTION INTRAVENOUS at 08:08

## 2024-08-21 RX ADMIN — OXYCODONE HYDROCHLORIDE 10 MG: 10 TABLET ORAL at 08:08

## 2024-08-21 RX ADMIN — CETIRIZINE HYDROCHLORIDE 10 MG: 10 TABLET, FILM COATED ORAL at 08:08

## 2024-08-21 RX ADMIN — GABAPENTIN 300 MG: 300 CAPSULE ORAL at 08:08

## 2024-08-21 RX ADMIN — PANTOPRAZOLE SODIUM 40 MG: 40 TABLET, DELAYED RELEASE ORAL at 08:08

## 2024-08-21 RX ADMIN — METHOCARBAMOL 750 MG: 750 TABLET ORAL at 11:08

## 2024-08-21 RX ADMIN — LOSARTAN POTASSIUM 100 MG: 25 TABLET, FILM COATED ORAL at 08:08

## 2024-08-21 RX ADMIN — CEFAZOLIN 2 G: 2 INJECTION, POWDER, FOR SOLUTION INTRAMUSCULAR; INTRAVENOUS at 04:08

## 2024-08-21 RX ADMIN — Medication 6 MG: at 12:08

## 2024-08-21 RX ADMIN — METOPROLOL SUCCINATE 100 MG: 100 TABLET, EXTENDED RELEASE ORAL at 08:08

## 2024-08-21 RX ADMIN — CETIRIZINE HYDROCHLORIDE 10 MG: 10 TABLET, FILM COATED ORAL at 12:08

## 2024-08-21 RX ADMIN — Medication 400 MG: at 08:08

## 2024-08-21 RX ADMIN — INSULIN ASPART 6 UNITS: 100 INJECTION, SOLUTION INTRAVENOUS; SUBCUTANEOUS at 12:08

## 2024-08-21 RX ADMIN — METHOCARBAMOL 500 MG: 500 TABLET ORAL at 12:08

## 2024-08-21 RX ADMIN — APIXABAN 2.5 MG: 2.5 TABLET, FILM COATED ORAL at 08:08

## 2024-08-21 RX ADMIN — SERTRALINE 100 MG: 100 TABLET, FILM COATED ORAL at 08:08

## 2024-08-21 RX ADMIN — CEFAZOLIN 2 G: 2 INJECTION, POWDER, FOR SOLUTION INTRAMUSCULAR; INTRAVENOUS at 12:08

## 2024-08-21 RX ADMIN — INSULIN ASPART 1 UNITS: 100 INJECTION, SOLUTION INTRAVENOUS; SUBCUTANEOUS at 12:08

## 2024-08-21 RX ADMIN — Medication 6 MG: at 08:08

## 2024-08-21 RX ADMIN — THERA TABS 1 TABLET: TAB at 08:08

## 2024-08-21 RX ADMIN — POLYETHYLENE GLYCOL 3350 17 G: 17 POWDER, FOR SOLUTION ORAL at 09:08

## 2024-08-21 RX ADMIN — ACETAMINOPHEN 1000 MG: 500 TABLET ORAL at 08:08

## 2024-08-21 RX ADMIN — ATORVASTATIN CALCIUM 10 MG: 10 TABLET, FILM COATED ORAL at 08:08

## 2024-08-21 RX ADMIN — GABAPENTIN 300 MG: 300 CAPSULE ORAL at 02:08

## 2024-08-21 RX ADMIN — BUPROPION HYDROCHLORIDE 150 MG: 150 TABLET, FILM COATED, EXTENDED RELEASE ORAL at 08:08

## 2024-08-21 RX ADMIN — MUPIROCIN 1 G: 20 OINTMENT TOPICAL at 08:08

## 2024-08-21 RX ADMIN — ONDANSETRON 4 MG: 2 INJECTION INTRAMUSCULAR; INTRAVENOUS at 08:08

## 2024-08-21 RX ADMIN — INSULIN ASPART 4 UNITS: 100 INJECTION, SOLUTION INTRAVENOUS; SUBCUTANEOUS at 06:08

## 2024-08-21 RX ADMIN — METHOCARBAMOL 750 MG: 750 TABLET ORAL at 06:08

## 2024-08-21 RX ADMIN — CHOLECALCIFEROL TAB 25 MCG (1000 UNIT) 2000 UNITS: 25 TAB at 08:08

## 2024-08-21 RX ADMIN — ACETAMINOPHEN 1000 MG: 500 TABLET ORAL at 02:08

## 2024-08-21 RX ADMIN — SENNOSIDES AND DOCUSATE SODIUM 1 TABLET: 50; 8.6 TABLET ORAL at 09:08

## 2024-08-21 RX ADMIN — FLUTICASONE FUROATE AND VILANTEROL TRIFENATATE 1 PUFF: 200; 25 POWDER RESPIRATORY (INHALATION) at 06:08

## 2024-08-21 RX ADMIN — SERTRALINE 100 MG: 100 TABLET, FILM COATED ORAL at 12:08

## 2024-08-21 RX ADMIN — MUPIROCIN 1 G: 20 OINTMENT TOPICAL at 09:08

## 2024-08-21 RX ADMIN — AMLODIPINE BESYLATE 5 MG: 5 TABLET ORAL at 08:08

## 2024-08-21 RX ADMIN — CELECOXIB 200 MG: 200 CAPSULE ORAL at 12:08

## 2024-08-21 RX ADMIN — ACETAMINOPHEN 1000 MG: 500 TABLET ORAL at 09:08

## 2024-08-21 NOTE — ASSESSMENT & PLAN NOTE
Patient has Abnormal Magnesium: hypomagnesemia. Will continue to monitor electrolytes closely. Will replace the affected electrolytes and repeat labs to be done after interventions completed. The patient's magnesium results have been reviewed and are listed below.  Recent Labs   Lab 08/21/24  0325   MG 1.4*      Still low today after 2 gram iv yesterday, replace oral and iv today

## 2024-08-21 NOTE — SUBJECTIVE & OBJECTIVE
Principal Problem:Left hip pain    Principal Orthopedic Problem: Osteolytic lesion of left proximal femur s/p L IMN 8/20/24    Interval History: NITA. VSS. AF. Pain 4-5/10 this morning. Carrillo in place. Tolerating PO though she does endorse some nausea.     Review of patient's allergies indicates:   Allergen Reactions    Taxol [paclitaxel]      Hypersensitivity reaction to taxol, symptoms included shortness of breath, nausea, dizziness, flushing     Carboplatin Other (See Comments)     Itching and hives    Adhesive Rash     tegaderm burns and blistered skin       Current Facility-Administered Medications   Medication    aluminum-magnesium hydroxide-simethicone 200-200-20 mg/5 mL suspension 30 mL    amLODIPine tablet 5 mg    apixaban tablet 2.5 mg    atorvastatin tablet 10 mg    benzonatate capsule 200 mg    bisacodyL suppository 10 mg    buPROPion TB24 tablet 150 mg    ceFAZolin 2 g in D5W 50 mL IVPB (MB+)    cetirizine tablet 10 mg    dextrose 10% bolus 125 mL 125 mL    dextrose 10% bolus 250 mL 250 mL    gabapentin capsule 300 mg    glucagon (human recombinant) injection 1 mg    glucose chewable tablet 16 g    glucose chewable tablet 24 g    insulin aspart U-100 pen 0-10 Units    losartan tablet 100 mg    magnesium oxide tablet 400 mg    magnesium sulfate 2g in water 50mL IVPB (premix)    melatonin tablet 6 mg    methocarbamoL tablet 500 mg    metoprolol succinate (TOPROL-XL) 24 hr tablet 100 mg    morphine injection 2 mg    multivitamin tablet    mupirocin 2 % ointment 1 g    naloxone 0.4 mg/mL injection 0.02 mg    ondansetron injection 4 mg    oxyCODONE immediate release tablet 5 mg    oxyCODONE immediate release tablet Tab 10 mg    pantoprazole EC tablet 40 mg    polyethylene glycol packet 17 g    senna-docusate 8.6-50 mg per tablet 1 tablet    senna-docusate 8.6-50 mg per tablet 1 tablet    sertraline tablet 100 mg    sodium chloride 0.9% flush 10 mL    vitamin D 1000 units tablet 2,000 Units  "    Facility-Administered Medications Ordered in Other Encounters   Medication    fentaNYL injection 25 mcg    haloperidol lactate injection 0.5 mg    HYDROmorphone injection 0.2 mg    ondansetron injection 4 mg    sodium chloride 0.9% flush 10 mL     Objective:     Vital Signs (Most Recent):  Temp: 98.4 °F (36.9 °C) (08/21/24 0443)  Pulse: 101 (08/21/24 0443)  Resp: 16 (08/21/24 0443)  BP: (!) 146/66 (08/21/24 0443)  SpO2: 96 % (08/21/24 0443) Vital Signs (24h Range):  Temp:  [97 °F (36.1 °C)-98.4 °F (36.9 °C)] 98.4 °F (36.9 °C)  Pulse:  [] 101  Resp:  [12-24] 16  SpO2:  [92 %-100 %] 96 %  BP: (111-167)/() 146/66     Weight: 92.8 kg (204 lb 9.4 oz)  Height: 5' 2" (157.5 cm)  Body mass index is 37.42 kg/m².      Intake/Output Summary (Last 24 hours) at 8/21/2024 0747  Last data filed at 8/20/2024 1630  Gross per 24 hour   Intake 1000 ml   Output 1050 ml   Net -50 ml        Ortho/SPM Exam     MSK:         LLE:  Inspection: Dressings clean, dry, and intact    Palpation: Appropriate post-op TTP of the lateral hip   Compartments soft and compressible.   ROM: Painless ROM ankle    Neuro: TA/Gastroc/EHL/FHL assessed in isolation without deficit.   SILT Sa/Shukal/DP/SP/T nerve distributions   Vascular: DP artery palpated 2+        Significant Labs: CBC:   Recent Labs   Lab 08/20/24  0240 08/20/24  1621   WBC 6.01 9.69   HGB 9.2* 9.4*   HCT 31.5* 32.2*    255     CMP:   Recent Labs   Lab 08/20/24  0240 08/21/24  0325    135*   K 4.8 4.8    101   CO2 25 24   * 154*   BUN 26* 22   CREATININE 1.1 0.9   CALCIUM 10.4 9.0   PROT 6.9 6.5   ALBUMIN 2.8* 2.6*   BILITOT 0.3 0.3   ALKPHOS 62 65   AST 14 20   ALT 10 13   ANIONGAP 8 10     Coagulation:   Recent Labs   Lab 08/19/24  1904   LABPROT 10.9   INR 1.0     All pertinent labs within the past 24 hours have been reviewed.    Significant Imaging: I have reviewed and interpreted all pertinent imaging results/findings.  "

## 2024-08-21 NOTE — ASSESSMENT & PLAN NOTE
Hannah Montoya is a 68 y.o. female with PMH of non-small cell lung cancer (treated w/ chemo & radiation), chronic liver disease, diabetes, hypertension, hyperlipidemia, COPD, radiation pneumonitis, breast cancer, dilated cardiomyopathy, and pacemaker placement presenting as a transfer from [facility] for orthopaedic oncology evaluation after presenting for 3 week history of pain in the left hip radiating to left knee that seems to be worsening. Imaging of the left hip (XR & CT) with oblique osteolytic lesion w/ cortical destruction of the left intertrochanteric region of femur.     S/p L IMN 8/21/24    -Admitted to medicine  -Pain: multimodal regimen  -DVT Ppx: Eliquis 2.5 BID   -WBAT left lower extremity   -ABX: postop ancef   -DC blake

## 2024-08-21 NOTE — PROGRESS NOTES
[facility] Acute Care Hospital Medicine  Progress Note    Patient Name: Hannah Montoya  MRN: [id number]  Patient Class: IP- Inpatient   Admission Date: 8/18/2024  Length of Stay: 1 days  Attending Physician: Penelope Joseph MD  Primary Care Provider: Emanuel Chavez MD        Subjective:     Principal Problem:Left hip pain        HPI:  Ms. Montoya is a 68 yr old female with a hx of HTN, dm type 2, left bundle branch block, CRT-D placement, breast cancer, NSCLC of RLL, obesity, depression, dilated cardiomyopathy, nonischemic cardiomyopathy, COPD, HLD who presents to the ED with a chief complaint of 10/10 constant sharp left hip pain that radiates to her left knee. Patient endorses that left hip has been bothering her over the last month.  She states that she had been using a cane to get around and the pain progressively worsened.  She was seen by her oncologist who recommended gabapentin 300 mg TID.  She also endorses that due to the left knee swelling and left hip pain she had to began using a walker to get around at home.  She states that she was seen by a bone Dr. who prescribed meloxicam and gave her a steroid shot in her knee.  About 1 week prior to today she was bringing in groceries and fell on her left side.  She endorses she was walking to the restroom with her cane yesterday and she heard a loud pop and began having excruciating pain and was unable to bear weight or ambulate with her left leg afterwards.  She endorses movement exacerbates her pain.  She states after treatment in the ED her pain is now 4-5/10.  She states that she quit smoking in 2016, rarely drinks alcohol, and denies recreational drug use.  She denies fever, chills, SOB, CP, nausea, vomiting, diarrhea, dysuria, hematuria, lightheadedness, dizziness, and syncope.  Of note, she sees Dr. Avina for Oncology and Dr. Lara for radiation.    Upon arrival to ED, patient afebrile, HR of 92, RR of 19, BP of 146/82, satting 99% on RA.   "Workup in the ED included CBC, CMP, left hip x-ray, CT left hip.  Workup revealed H/H of 9.5/32.4, calcium of 2.7.  Left hip x-ray showed AP pelvis and two views of the left hip demonstrate an oblique lucency in the left intertrochanteric region.  Finding may represent artifact versus nondisplaced fracture.  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  There is no hip dislocation.  There is no displaced fracture.  Degenerative changes are seen in the visualized lower lumbar spine."Patient was given morphine 4 mg IV, ondansetron 4 mg IV, and orphenadrine 60 mg IM while in the ED.  Case discussed with ED provider and patient will be placed under observation for further management.    Overview/Hospital Course:  No notes on file    Interval history- she reports pain increased this AM as PNC somehow fell out right after surgery yesterday, oxy 5/10 added yesterday and on robaxin 500, and discussed with dr parrish outside room after exam and he recs to inc robaxin and add nsaid to see how that helps and then can adjust more if need so added celebrex and inc robaxin to750 scheduled and wlll trend. Has morphine for berakthrough as well if severe. Hg 9.5--8.4. mag still low at 1.4 after 2 grams yeterday so will replace IV and oral today to try to get over 2 for goal. I updated her on what dr raza said yesterday about immunotherapy plan and pending if she goes to post acute care will determine when they willl lkely iniitate-now vs 2 weeks, and will update him today once recs are in so he can schedule f/u around this if so. She had her IV in left hand infiltrated and hand swelling and iv stopped by not removed and some nausea also this AM. Has been awiting for pain meds and Iv removal since she told the night nurse this am. I told charge nurse ceci who is going to come to bedside to assist with " nausea and pain meds and removing the IV which infiltrated, she has antoher IV in AC fossa in left arm which appears okay. Will f/u therapy recs further today    Review of patient's allergies indicates:   Allergen Reactions    Taxol [paclitaxel]      Hypersensitivity reaction to taxol, symptoms included shortness of breath, nausea, dizziness, flushing     Carboplatin Other (See Comments)     Itching and hives    Adhesive Rash     tegaderm burns and blistered skin       Current Facility-Administered Medications on File Prior to Encounter   Medication    fentaNYL injection 25 mcg    haloperidol lactate injection 0.5 mg    HYDROmorphone injection 0.2 mg    ondansetron injection 4 mg    sodium chloride 0.9% flush 10 mL     Current Outpatient Medications on File Prior to Encounter   Medication Sig    metoprolol succinate (TOPROL-XL) 100 MG 24 hr tablet TAKE 1 TABLET BY MOUTH EVERY DAY    ACCU-CHEK ALFRED PLUS TEST STRP Strp USE TO TEST THREE TIMES DAILY    albuterol (ACCUNEB) 1.25 mg/3 mL Nebu use 1 AMPULE (3ml) via NEBULIZER EVERY 6 HOURS AS NEEDED    albuterol (VENTOLIN HFA) 90 mcg/actuation inhaler Inhale 2 puffs into the lungs every 4 (four) hours as needed for Wheezing or Shortness of Breath. Rescue    amLODIPine (NORVASC) 5 MG tablet TAKE 1 TABLET BY MOUTH EVERY DAY    atorvastatin (LIPITOR) 10 MG tablet TAKE 1 TABLET BY MOUTH EVERY DAY    benzonatate (TESSALON) 200 MG capsule Take 1 capsule (200 mg total) by mouth 3 (three) times daily as needed for Cough.    betamethasone dipropionate 0.05 % cream Apply topically 2 (two) times daily as needed (rash). (Patient not taking: Reported on 5/2/2024)    buPROPion (WELLBUTRIN XL) 150 MG TB24 tablet TAKE 1 TABLET BY MOUTH EVERY DAY    cetirizine (ZYRTEC) 10 MG tablet Take 10 mg by mouth every evening.     FLUAD QUAD 2023-24,65Y UP,,PF, 60 mcg (15 mcg x 4)/0.5 mL Syrg     gabapentin (NEURONTIN) 300 MG capsule Take 1 capsule (300 mg total) by mouth 3 (three) times daily.     losartan (COZAAR) 100 MG tablet TAKE 1 TABLET BY MOUTH EVERY DAY    meloxicam (MOBIC) 15 MG tablet Take 1 tablet (15 mg total) by mouth once daily.    metFORMIN (GLUCOPHAGE) 500 MG tablet TAKE 2 TABLETS BY MOUTH TWICE A DAY WITH MEALS    multivitamin (THERAGRAN) per tablet Take 1 tablet by mouth once daily.    mupirocin (BACTROBAN) 2 % ointment Apply topically 3 (three) times daily. (Patient not taking: Reported on 5/2/2024)    OMNIPAQUE 300 300 mg iodine/mL injection  (Patient not taking: Reported on 5/2/2024)    OMNIPAQUE 350 350 mg iodine/mL Soln injection  (Patient not taking: Reported on 5/2/2024)    ondansetron (ZOFRAN-ODT) 8 MG TbDL Take 1 tablet (8 mg total) by mouth every 8 (eight) hours as needed (nausea/vomiting). Take 1 tablet (8 mg) by mouth every 8 hours as needed for nausea/vomiting.    OPDIVO 120 mg/12 mL Soln  (Patient not taking: Reported on 5/2/2024)    OPDIVO 240 mg/24 mL Soln  (Patient not taking: Reported on 5/2/2024)    OPDIVO 40 mg/4 mL injection  (Patient not taking: Reported on 5/2/2024)    palonosetron (ALOXI) 0.25 mg/5 mL injection     pantoprazole (PROTONIX) 40 MG tablet TAKE 1 TABLET BY MOUTH EVERY DAY    READI-CAT 2 2 % (w/v) suspension     READI-CAT 2 2.1 % (w/v), 2.0 % (w/w) suspension     semaglutide (OZEMPIC) 0.25 mg or 0.5 mg(2 mg/1.5 mL) pen injector Inject 0.5 mg into the skin every 7 days. (Patient not taking: Reported on 5/2/2024)    sertraline (ZOLOFT) 100 MG tablet TAKE 1 TABLET BY MOUTH EVERY DAY IN THE EVENING    tiotropium (SPIRIVA WITH HANDIHALER) 18 mcg inhalation capsule INHALE THE CONTENTS OF 1 CAPSULE BY MOUTH EVERY DAY    triamcinolone acetonide 0.1% (KENALOG) 0.1 % cream APPLY TOPICALLY TWICE A DAY (Patient not taking: Reported on 5/2/2024)    WIXELA INHUB 250-50 mcg/dose diskus inhaler INHALE 1 PUFF INTO THE LUNGS 2 (TWO) TIMES DAILY. CONTROLLER    YERVOY 50 mg/10 mL (5 mg/mL)  (Patient not taking: Reported on 5/2/2024)     Family History       Problem Relation  (Age of Onset)    Cataracts Mother, Father    Clotting disorder Brother    Kidney disease Mother, Father    Macular degeneration Maternal Grandmother    No Known Problems Daughter, Son, Sister, Maternal Aunt, Maternal Uncle, Paternal Aunt, Paternal Uncle, Maternal Grandfather, Paternal Grandmother, Paternal Grandfather          Tobacco Use    Smoking status: Former     Current packs/day: 0.00     Average packs/day: 0.5 packs/day for 40.0 years (20.0 ttl pk-yrs)     Types: Cigarettes     Start date: 1976     Quit date: 2016     Years since quittin.0     Passive exposure: Past    Smokeless tobacco: Never   Substance and Sexual Activity    Alcohol use: Yes     Alcohol/week: 1.0 standard drink of alcohol     Types: 1 Glasses of wine per week     Comment: rare- holiday    Drug use: No    Sexual activity: Not Currently     Partners: Male     Comment:      Review of Systems   Constitutional:  Negative for chills and fever.   Respiratory:  Negative for shortness of breath.    Cardiovascular:  Negative for chest pain.   Gastrointestinal:  Negative for abdominal pain, diarrhea, nausea and vomiting.   Genitourinary:  Negative for dysuria and hematuria.   Musculoskeletal:  Positive for arthralgias (left hip and left knee) and joint swelling (left knee).   Neurological:  Negative for dizziness, syncope and light-headedness.     Objective:     Vital Signs (Most Recent):  Temp: 98.5 °F (36.9 °C) (24 0758)  Pulse: 97 (24 1028)  Resp: 20 (24 0838)  BP: (!) 164/75 (24 0758)  SpO2: 95 % (24 0758) Vital Signs (24h Range):  Temp:  [97.4 °F (36.3 °C)-98.5 °F (36.9 °C)] 98.5 °F (36.9 °C)  Pulse:  [] 97  Resp:  [16-24] 20  SpO2:  [94 %-100 %] 95 %  BP: (126-167)/() 164/75     Weight: 92.8 kg (204 lb 9.4 oz)  Body mass index is 37.42 kg/m².     Physical Exam  Vitals reviewed.   Constitutional:       General: She is awake. She is not in acute distress.     Appearance: Normal  appearance. She is not ill-appearing or diaphoretic.   Cardiovascular:      Rate and Rhythm: Normal rate.      Heart sounds: Normal heart sounds. No murmur heard.     No friction rub. No gallop.      Comments: Trace BLE edema  Pulmonary:      Effort: Pulmonary effort is normal. No accessory muscle usage or respiratory distress.      Breath sounds: Normal breath sounds. No wheezing, rhonchi or rales.   Abdominal:      General: Bowel sounds are normal.      Palpations: Abdomen is soft.      Tenderness: There is no abdominal tenderness. There is no guarding or rebound.   Musculoskeletal:      Comments: Nbandages to LLE. Left hand with iv infiltrated and swelling in hand. Iv in left forearm without infiltration signs.   Skin:     General: Skin is warm and dry.   Neurological:      Mental Status: She is alert and oriented to person, place, and time.   Psychiatric:         Behavior: Behavior is cooperative.                Significant Labs: All pertinent labs within the past 24 hours have been reviewed.  CBC:   Recent Labs   Lab 08/20/24  0240 08/20/24  1621 08/21/24  0325   WBC 6.01 9.69 7.36   HGB 9.2* 9.4* 8.4*   HCT 31.5* 32.2* 29.3*    255 239     CMP:   Recent Labs   Lab 08/20/24  0240 08/21/24  0325    135*   K 4.8 4.8    101   CO2 25 24   * 154*   BUN 26* 22   CREATININE 1.1 0.9   CALCIUM 10.4 9.0   PROT 6.9 6.5   ALBUMIN 2.8* 2.6*   BILITOT 0.3 0.3   ALKPHOS 62 65   AST 14 20   ALT 10 13   ANIONGAP 8 10       Significant Imaging:   Imaging Results               CT Hip Without Contrast Left (Final result)  Result time 08/19/24 01:06:29      Final result by Marlene Herrera MD (08/19/24 01:06:29)                   Impression:      As above described.    This report was flagged in Epic as abnormal.      Electronically signed by: Marlene Hererra  Date:    08/19/2024  Time:    01:06               Narrative:    EXAMINATION:  CT ABDOMEN PELVIS WITHOUT    CLINICAL HISTORY:  Left hip  pain.    TECHNIQUE:  1.25 mm unenhanced axial images from the lung bases through the greater trochanters were performed.  Coronal and sagittal reformatted images were provided.    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  There is no hip dislocation.  There is no displaced fracture.  Degenerative changes are seen in the visualized lower lumbar spine.                                       X-Ray Hip 2 or 3 views Left with Pelvis when performed (Final result)  Result time 08/18/24 22:51:12      Final result by Marlene Herrera MD (08/18/24 22:51:12)                   Impression:      As above described.      Electronically signed by: Marlene Herrera  Date:    08/18/2024  Time:    22:51               Narrative:    EXAMINATION:  XR HIP WITH PELVIS WHEN PERFORMED 2 OR THREE VIEWS LEFT    CLINICAL HISTORY:  Pain in left hip    TECHNIQUE:  AP and lateral view of the left hip    COMPARISON:  None.    FINDINGS:  AP pelvis and two views of the left hip demonstrate an oblique lucency in the left intertrochanteric region.  Finding may represent artifact versus nondisplaced fracture.  There is mild degenerative changes at the hip joints.                                       Assessment/Plan:      * Left hip pain  Impending fx from met seen on imaging, transfer here, ortho to OR 8/20 for IM nail.full op note pending today still.  -talked to dr raza with heme onc, aware of admit, plan for immunotherapy as outpatient for treatment  -eliquis post op, end date sept 25  -pt/ot pod 1 and pending recs.  -multimodal pain meds, bowel regimen. Pain meds increased today as PNC accidentally removed right after surgery, inc robaxin/add celebrex, has oxy 5/10 as well as morphine for breakthrough.  -hg 9.5 pre op--8 now.  Carrillo out POD 1- today    Acute blood loss anemia  Anemia is likely due to acute blood  loss which was from surgery . Most recent hemoglobin and hematocrit are listed below.  Recent Labs     08/20/24  0240 08/20/24  1621 08/21/24  0325   HGB 9.2* 9.4* 8.4*   HCT 31.5* 32.2* 29.3*     Plan  - Monitor serial CBC: Daily  - Transfuse PRBC if patient becomes hemodynamically unstable, symptomatic or H/H drops below 7/21.  - Patient has not received any PRBC transfusions to date  - Patient's anemia is currently  monitoring  - baseline anemia from cancer as well    Pathological fracture of intertrochanteric section of femur  See hip pain      Hypomagnesemia  Patient has Abnormal Magnesium: hypomagnesemia. Will continue to monitor electrolytes closely. Will replace the affected electrolytes and repeat labs to be done after interventions completed. The patient's magnesium results have been reviewed and are listed below.  Recent Labs   Lab 08/21/24  0325   MG 1.4*      Still low today after 2 gram iv yesterday, replace oral and iv today    Moderate episode of recurrent major depressive disorder  - Continue home zoloft and wellbutrin        Class 2 severe obesity due to excess calories with serious comorbidity in adult  Body mass index is 38.41 kg/m². Morbid obesity complicates all aspects of disease management from diagnostic modalities to treatment.      NSCLC of right lung  - sees dr raza with heme/onc, dx 2021, has been on multiple regmiens per notes, RLL, hilar adenopathy known. Had more hip pain recently and had scans showing likely progression with this lesion that now is showing impending fx that she is getting fixed with 8/20 today  Messaged dr raza 8/20 about surgery today, plan for immunotherapy that doesn't need to hold as doesn't cause wound healing issues per him, but may have to hold if at post acute care and will f/u recs today and touch base with him after that is in to determine when best to follow up with I'm in clinic. I let her know what he had said so far.      History of breast cancer  in female  - Hx noted  2016      Cardiac resynchronization therapy defibrillator (CRT-D) in place  - Hx noted      Left bundle-branch block  - Hx noted      Type 2 diabetes mellitus without complication  Last A1c reviewed-   Lab Results   Component Value Date    HGBA1C 6.1 (H) 08/19/2024     Current correctional scale  Medium  Maintain anti-hyperglycemic dose as follows-   Antihyperglycemics (From admission, onward)      Start     Stop Route Frequency Ordered    08/19/24 0526  insulin aspart U-100 pen 0-10 Units         -- SubQ Every 6 hours PRN 08/19/24 0429          Hold Oral hypoglycemics while patient is in the hospital.  At goal in 100s    Essential hypertension  Chronic, Latest blood pressure and vitals reviewed-     Temp:  [98.1 °F (36.7 °C)]   Pulse:  [77-92]   Resp:  [18-19]   BP: (138-178)/(72-99)   SpO2:  [93 %-99 %] .   Home meds for hypertension were reviewed and noted below.   Hypertension Medications               amLODIPine (NORVASC) 5 MG tablet TAKE 1 TABLET BY MOUTH EVERY DAY    losartan (COZAAR) 100 MG tablet TAKE 1 TABLET BY MOUTH EVERY DAY    metoprolol succinate (TOPROL-XL) 100 MG 24 hr tablet TAKE 1 TABLET BY MOUTH EVERY DAY            While in the hospital, will manage blood pressure as follows; Continue home antihypertensive regimen    Will utilize p.r.n. blood pressure medication only if patient's blood pressure greater than 180/110 and she develops symptoms such as worsening chest pain or shortness of breath.      VTE Risk Mitigation (From admission, onward)           Ordered     apixaban tablet 2.5 mg  2 times daily         08/20/24 1531     Place sequential compression device  Until discontinued         08/20/24 1006     Place sequential compression device  Until discontinued         08/19/24 0429                    Discharge Planning   GUERRERO: 8/23/2024     Code Status: Full Code   Is the patient medically ready for discharge?:     Reason for patient still in hospital (select all that  apply): Patient trending condition  Discharge Plan A: Home (with instructions to follow up)                  Penelope Joseph MD  Department of Hospital Medicine   [facility]

## 2024-08-21 NOTE — ASSESSMENT & PLAN NOTE
- sees dr raza with heme/onc, dx 2021, has been on multiple regmiens per notes, RLL, hilar adenopathy known. Had more hip pain recently and had scans showing likely progression with this lesion that now is showing impending fx that she is getting fixed with 8/20 today  Messaged dr raza 8/20 about surgery today, plan for immunotherapy that doesn't need to hold as doesn't cause wound healing issues per him, but may have to hold if at post acute care and will f/u recs today and touch base with him after that is in to determine when best to follow up with I'm in clinic. I let her know what he had said so far.

## 2024-08-21 NOTE — PLAN OF CARE
Problem: Occupational Therapy  Goal: Occupational Therapy Goal  Description: Goals to be met by: 9/20/24     Patient will increase functional independence with ADLs by performing:    UE Dressing with Supervision.  LE Dressing with Stand-by Assistance.  Toileting from toilet with Supervision for hygiene and clothing management.   All functional transfers performed with SBA    Outcome: Progressing

## 2024-08-21 NOTE — ASSESSMENT & PLAN NOTE
Impending fx from met seen on imaging, transfer here, ortho to OR 8/20 for IM nail.full op note pending today still.  -talked to dr raza with heme onc, aware of admit, plan for immunotherapy as outpatient for treatment  -eliquis post op, end date sept 25  -pt/ot pod 1 and pending recs.  -multimodal pain meds, bowel regimen. Pain meds increased today as PNC accidentally removed right after surgery, inc robaxin/add celebrex, has oxy 5/10 as well as morphine for breakthrough.  -hg 9.5 pre op--8 now.  Carrillo out POD 1- today

## 2024-08-21 NOTE — PT/OT/SLP EVAL
"Physical Therapy Evaluation/co eval with OT    Patient Name:  Hannah Montoya   MRN:  [id number]    Recommendations:     Discharge Recommendations: Low Intensity Therapy   Discharge Equipment Recommendations: bedside commode, walker, rolling   Barriers to discharge: None    Assessment:     Hannah Montoya is a 68 y.o. female admitted with a medical diagnosis of Left hip pain.  She presents with the following impairments/functional limitations: gait instability, impaired functional mobility, weakness, impaired balance, decreased lower extremity function, pain, orthopedic precautions, decreased safety awareness, impaired self care skills . Pt is unsafe with functional mobility at this time due to pt requires moderate assist for bed mobility, moderate assist for transfers, and minimal assist for gait due to weakness, instability, pain, and fatigue. Pt is motivated to progress with functional mobility.     Rehab Prognosis: Good; patient would benefit from acute skilled PT services to address these deficits and reach maximum level of function.    Recent Surgery: Procedure(s) (LRB):  INSERTION, INTRAMEDULLARY ANTOINE, FEMUR (Left)  BIOPSY, FEMUR (Left) 1 Day Post-Op    Plan:     During this hospitalization, patient to be seen daily (hip pathway see 8/21,8/22,8/23 then 5xs/week) to address the identified rehab impairments via gait training, therapeutic activities, therapeutic exercises, neuromuscular re-education and progress toward the following goals:    Plan of Care Expires:  09/20/24    Subjective   "I need to get out of bed on the R side"    Pain/Comfort:  Pain Rating 1: 4/10  Location - Side 1: Left  Location - Orientation 1: generalized  Location 1: leg  Pain Addressed 1: Pre-medicate for activity, Cessation of Activity, Nurse notified  Pain Rating Post-Intervention 1: 4/10    Patients cultural, spiritual, Jain conflicts given the current situation: no    Living Environment:  Pt lives with her son and " his girlfriend in a 1 story home with no DENISE. Pt states her daughter lives nearby  Prior to admission, patients level of function was independent with SC recently due to hip pain.  Equipment used at home: cane, straight, rollator, wheelchair (transport w/c).   Upon discharge, patient will have assistance from son or daughter.    Objective:     Communicated with nurse prior to session.  Patient found HOB elevated with telemetry, lozano catheter, FCD, SCD  upon PT entry to room.    General Precautions: Standard, fall  Orthopedic Precautions:LLE weight bearing as tolerated   Braces: N/A  Respiratory Status: Room air    Exams:  Cognitive Exam:  Patient is oriented to Person, Place, and Time  Sensation:    -       Intact  light/touch B LE  RLE ROM: WFL  RLE Strength: Deficits: hip flex 3+/5; knee flex/ext 4/5; ankle DF 4/5  LLE ROM: WFL except hip flex limited due to pain  LLE Strength: Deficits: hip flex 2/5; knee flex/ext 3-/5; ankle DF 4/5    Functional Mobility:  Bed Mobility:     Supine to Sit: moderate assistance  Transfers:     Sit to Stand:  moderate assistance with rolling walker  Gait: 15ft then 12ft with RW with minimal assist. Pt performed gait with flexed trunk, decreased step length, required verbal cues throughout for proper sequencing, step to gait, and at slow pace.     AM-PAC 6 CLICK MOBILITY  Total Score:15     Treatment & Education:  Pt c/o dizziness upon standing, BP after sitting 131/58- Nurse notified. Pt stood at the sink with CGA and RW support to perform ADLs.  Co-treat with OT due to medical complexity of pt and need for skilled hands for safe intervention.   Patient left up in chair with all lines intact, call button in reach, and nurse notified.    GOALS:   Multidisciplinary Problems       Physical Therapy Goals          Problem: Physical Therapy    Goal Priority Disciplines Outcome Goal Variances Interventions   Physical Therapy Goal     PT, PT/OT Progressing     Description: PT goals until  9/2/24    1. Pt supine to sit with CGA-not met  2. Pt sit to supine with CGA-not met  3. Pt sit to stand with RW with WBAT L LE with CGA-not met  4. Pt to perform gait 100ft with RW with WBAT L LE with CGA.-not met  5. Pt to go up/down curb step with RW with WBAT L LE with minimal assist.-not met  6. Pt to perform B LE exs in sitting or supine x 10 reps to strengthen B LE to improve functional mobility.-not met    DME Justifications (see above for complete DME recommendations)    Bedside Commode- Patient has a mobility limitation that significantly impairs their ability to participate in one or more mobility related activities of daily living, including toileting. This deficit can be resolved by using a bedside commode. Patient demonstrates mobility limitations that will cause them to be confined to one room at home without bathroom access for up to 30 days. Using a bedside commode will greatly improve the patient's ability to participate in MRADLs.     Rolling Walker- Patient demonstrates a mobility limitation that significantly impairs their ability to participate in one or more mobility related activities of daily living. Patient's mobility limitation cannot be sufficiently resolved with the use of a cane, but can be sufficiently resolved with the use of a rolling walker.The use of a rolling walker will considerably improve their ability to participate in MRADLs. Patient will use the walker on a regular basis at home.                             History:     Past Medical History:   Diagnosis Date    AICD (automatic cardioverter/defibrillator) present     Asthma     bronchitis in past    Breast cancer 2016    right    Cardiac pacemaker     Cardiomyopathy     COPD (chronic obstructive pulmonary disease)     Diabetes mellitus, type 2     Hyperglycemia     Hyperlipidemia     Hypertension     Malignant neoplasm of overlapping sites of female breast 2/12/2016    Nuclear sclerosis of both eyes 8/12/2020    Respiratory  distress 3/12/2020       Past Surgical History:   Procedure Laterality Date    BIOPSY, WITH CT GUIDANCE Right 2023    Procedure: LUNG MASS BIOPSY, WITH CT GUIDANCE;  Surgeon: Yehuda Green MD;  Location: [facility] CATH LAB;  Service: Radiology;  Laterality: Right;    BREAST BIOPSY Right 2016    IDC    BREAST LUMPECTOMY Right     CARDIAC CATHETERIZATION Bilateral 2017    CARDIAC DEFIBRILLATOR PLACEMENT Left 08/10/2017    CARDIAC DEFIBRILLATOR PLACEMENT Left 2018     SECTION      x2    CHOLECYSTECTOMY      FEMUR BIOPSY Left 2024    Procedure: BIOPSY, FEMUR;  Surgeon: Porter Campos MD;  Location: [address];  Service: Orthopedics;  Laterality: Left;    INSERTION OF TUNNELED CENTRAL VENOUS CATHETER (CVC) WITH SUBCUTANEOUS PORT Right 2020    Procedure: [id number], RIGHT;  Surgeon: Josefa Caceres MD;  Location: [address];  Service: General;  Laterality: Right;  Port-a-cath placed to R. IJ    INTRAMEDULLARY RODDING OF FEMUR Left 2024    Procedure: INSERTION, INTRAMEDULLARY ANTOINE, FEMUR;  Surgeon: Porter Campos MD;  Location: [facility] OR [address] [place] [address];  Service: Orthopedics;  Laterality: Left;    LUNG BIOPSY N/A 2020    Procedure: BIOPSY, LUNG;  Surgeon: [facility] Provider;  Location: [facility] OR;  Service: Radiology;  Laterality: N/A;  8AM START  RN PREOP 2020---COVID NEGATIVE    NAVIGATIONAL BRONCHOSCOPY N/A 10/13/2020    Procedure: BRONCHOSCOPY, NAVIGATIONAL;  Surgeon: Jamilah Weaver MD;  [facility];  Service: Pulmonary;  Laterality: N/A;    REVISION OF IMPLANTABLE CARDIOVERTER-DEFIBRILLATOR (ICD) ELECTRODE LEAD PLACEMENT N/A 6/15/2018    Procedure: REVISION-LEAD-ICD;  Surgeon: Javy Gates MD;  Location: [facility] CATH LAB;  Service: Cardiology;  Laterality: N/A;  LBBB, Bi-V ICD HIS Elmo jordan MDT, Choice, MB, 3 Prep    ROBOT-ASSISTED LAPAROSCOPIC LYMPHADENECTOMY USING DA SALVADOR XI Right 10/23/2020    Procedure:  XI ROBOTIC LYMPHADENECTOMY;  Surgeon: Ramo Tucker MD;  Location: [facility] OR [address] [place] [address];  Service: Thoracic;  Laterality: Right;    TUBAL LIGATION         Time Tracking:     PT Received On: 08/21/24  PT Start Time: 1028     PT Stop Time: 1101  PT Total Time (min): 33 min     Billable Minutes: Evaluation 13 and Gait Training 20      08/21/2024

## 2024-08-21 NOTE — PROGRESS NOTES
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 VSS. AF. Pain 4-5/10 this morning. Carrillo in place. Tolerating PO though she does endorse some nausea.     Review of patient's allergies indicates:   Allergen Reactions    Taxol [paclitaxel]      Hypersensitivity reaction to taxol, symptoms included shortness of breath, nausea, dizziness, flushing     Carboplatin Other (See Comments)     Itching and hives    Adhesive Rash     tegaderm burns and blistered skin       Current Facility-Administered Medications   Medication    aluminum-magnesium hydroxide-simethicone 200-200-20 mg/5 mL suspension 30 mL    amLODIPine tablet 5 mg    apixaban tablet 2.5 mg    atorvastatin tablet 10 mg    benzonatate capsule 200 mg    bisacodyL suppository 10 mg    buPROPion TB24 tablet 150 mg    ceFAZolin 2 g in D5W 50 mL IVPB (MB+)    cetirizine tablet 10 mg    dextrose 10% bolus 125 mL 125 mL    dextrose 10% bolus 250 mL 250 mL    gabapentin capsule 300 mg    glucagon (human recombinant) injection 1 mg    glucose chewable tablet 16 g    glucose chewable tablet 24 g    insulin aspart U-100 pen 0-10 Units    losartan tablet 100 mg    magnesium oxide tablet 400 mg    magnesium sulfate 2g in water 50mL IVPB (premix)    melatonin tablet 6 mg    methocarbamoL tablet 500 mg    metoprolol succinate (TOPROL-XL) 24 hr tablet 100 mg    morphine injection 2 mg    multivitamin tablet    mupirocin 2 % ointment 1 g    naloxone 0.4 mg/mL injection 0.02 mg  "   ondansetron injection 4 mg    oxyCODONE immediate release tablet 5 mg    oxyCODONE immediate release tablet Tab 10 mg    pantoprazole EC tablet 40 mg    polyethylene glycol packet 17 g    senna-docusate 8.6-50 mg per tablet 1 tablet    senna-docusate 8.6-50 mg per tablet 1 tablet    sertraline tablet 100 mg    sodium chloride 0.9% flush 10 mL    vitamin D 1000 units tablet 2,000 Units     Facility-Administered Medications Ordered in Other Encounters   Medication    fentaNYL injection 25 mcg    haloperidol lactate injection 0.5 mg    HYDROmorphone injection 0.2 mg    ondansetron injection 4 mg    sodium chloride 0.9% flush 10 mL     Objective:     Vital Signs (Most Recent):  Temp: 98.4 °F (36.9 °C) (08/21/24 0443)  Pulse: 101 (08/21/24 0443)  Resp: 16 (08/21/24 0443)  BP: (!) 146/66 (08/21/24 0443)  SpO2: 96 % (08/21/24 0443) Vital Signs (24h Range):  Temp:  [97 °F (36.1 °C)-98.4 °F (36.9 °C)] 98.4 °F (36.9 °C)  Pulse:  [] 101  Resp:  [12-24] 16  SpO2:  [92 %-100 %] 96 %  BP: (111-167)/() 146/66     Weight: 92.8 kg (204 lb 9.4 oz)  Height: 5' 2" (157.5 cm)  Body mass index is 37.42 kg/m².      Intake/Output Summary (Last 24 hours) at 8/21/2024 0747  Last data filed at 8/20/2024 1630  Gross per 24 hour   Intake 1000 ml   Output 1050 ml   Net -50 ml        Ortho/SPM Exam     MSK:         LLE:  Inspection: Dressings clean, dry, and intact    Palpation: Appropriate post-op TTP of the lateral hip   Compartments soft and compressible.   ROM: Painless ROM ankle    Neuro: TA/Gastroc/EHL/FHL assessed in isolation without deficit.   SILT Sa/Shukla/DP/SP/T nerve distributions   Vascular: DP artery palpated 2+        Significant Labs: CBC:   Recent Labs   Lab 08/20/24  0240 08/20/24  1621   WBC 6.01 9.69   HGB 9.2* 9.4*   HCT 31.5* 32.2*    255     CMP:   Recent Labs   Lab 08/20/24  0240 08/21/24  0325    135*   K 4.8 4.8    101   CO2 25 24   * 154*   BUN 26* 22   CREATININE 1.1 0.9   CALCIUM " 10.4 9.0   PROT 6.9 6.5   ALBUMIN 2.8* 2.6*   BILITOT 0.3 0.3   ALKPHOS 62 65   AST 14 20   ALT 10 13   ANIONGAP 8 10     Coagulation:   Recent Labs   Lab 08/19/24  1904   LABPROT 10.9   INR 1.0     All pertinent labs within the past 24 hours have been reviewed.    Significant Imaging: I have reviewed and interpreted all pertinent imaging results/findings.  Assessment/Plan:     * Left hip pain  Hannah Montoya is a 68 y.o. female with PMH of non-small cell lung cancer (treated w/ chemo & radiation), chronic liver disease, diabetes, hypertension, hyperlipidemia, COPD, radiation pneumonitis, breast cancer, dilated cardiomyopathy, and pacemaker placement presenting as a transfer from [facility] for orthopaedic oncology evaluation after presenting for 3 week history of pain in the left hip radiating to left knee that seems to be worsening. Imaging of the left hip (XR & CT) with oblique osteolytic lesion w/ cortical destruction of the left intertrochanteric region of femur.     S/p L IMN 8/21/24    -Admitted to medicine  -Pain: multimodal regimen  -DVT Ppx: Eliquis 2.5 BID   -WBAT left lower extremity   -ABX: postop ancef   -DC blake Mai MD  Orthopedics  [facility] - [facility]

## 2024-08-21 NOTE — ASSESSMENT & PLAN NOTE
Anemia is likely due to acute blood loss which was from surgery . Most recent hemoglobin and hematocrit are listed below.  Recent Labs     08/20/24  0240 08/20/24  1621 08/21/24  0325   HGB 9.2* 9.4* 8.4*   HCT 31.5* 32.2* 29.3*     Plan  - Monitor serial CBC: Daily  - Transfuse PRBC if patient becomes hemodynamically unstable, symptomatic or H/H drops below 7/21.  - Patient has not received any PRBC transfusions to date  - Patient's anemia is currently  monitoring  - baseline anemia from cancer as well

## 2024-08-21 NOTE — SUBJECTIVE & OBJECTIVE
Interval history- she reports pain increased this AM as PNC somehow fell out right after surgery yesterday, oxy 5/10 added yesterday and on robaxin 500, and discussed with dr parrish outside room after exam and he recs to inc robaxin and add nsaid to see how that helps and then can adjust more if need so added celebrex and inc robaxin to750 scheduled and wlll trend. Has morphine for berakthrough as well if severe. Hg 9.5--8.4. mag still low at 1.4 after 2 grams yeterday so will replace IV and oral today to try to get over 2 for goal. I updated her on what dr raza said yesterday about immunotherapy plan and pending if she goes to post acute care will determine when they willl lkely iniitate-now vs 2 weeks, and will update him today once recs are in so he can schedule f/u around this if so. She had her IV in left hand infiltrated and hand swelling and iv stopped by not removed and some nausea also this AM. Has been awiting for pain meds and Iv removal since she told the night nurse this am. I told charge nurse ceci who is going to come to bedside to assist with nausea and pain meds and removing the IV which infiltrated, she has antoher IV in AC fossa in left arm which appears okay. Will f/u therapy recs further today    Review of patient's allergies indicates:   Allergen Reactions    Taxol [paclitaxel]      Hypersensitivity reaction to taxol, symptoms included shortness of breath, nausea, dizziness, flushing     Carboplatin Other (See Comments)     Itching and hives    Adhesive Rash     tegaderm burns and blistered skin       Current Facility-Administered Medications on File Prior to Encounter   Medication    fentaNYL injection 25 mcg    haloperidol lactate injection 0.5 mg    HYDROmorphone injection 0.2 mg    ondansetron injection 4 mg    sodium chloride 0.9% flush 10 mL     Current Outpatient Medications on File Prior to Encounter   Medication Sig    metoprolol succinate (TOPROL-XL) 100 MG 24 hr tablet TAKE 1  TABLET BY MOUTH EVERY DAY    ACCU-CHEK ALFRED PLUS TEST STRP Strp USE TO TEST THREE TIMES DAILY    albuterol (ACCUNEB) 1.25 mg/3 mL Nebu use 1 AMPULE (3ml) via NEBULIZER EVERY 6 HOURS AS NEEDED    albuterol (VENTOLIN HFA) 90 mcg/actuation inhaler Inhale 2 puffs into the lungs every 4 (four) hours as needed for Wheezing or Shortness of Breath. Rescue    amLODIPine (NORVASC) 5 MG tablet TAKE 1 TABLET BY MOUTH EVERY DAY    atorvastatin (LIPITOR) 10 MG tablet TAKE 1 TABLET BY MOUTH EVERY DAY    benzonatate (TESSALON) 200 MG capsule Take 1 capsule (200 mg total) by mouth 3 (three) times daily as needed for Cough.    betamethasone dipropionate 0.05 % cream Apply topically 2 (two) times daily as needed (rash). (Patient not taking: Reported on 5/2/2024)    buPROPion (WELLBUTRIN XL) 150 MG TB24 tablet TAKE 1 TABLET BY MOUTH EVERY DAY    cetirizine (ZYRTEC) 10 MG tablet Take 10 mg by mouth every evening.     FLUAD QUAD 2023-24,65Y UP,,PF, 60 mcg (15 mcg x 4)/0.5 mL Syrg     gabapentin (NEURONTIN) 300 MG capsule Take 1 capsule (300 mg total) by mouth 3 (three) times daily.    losartan (COZAAR) 100 MG tablet TAKE 1 TABLET BY MOUTH EVERY DAY    meloxicam (MOBIC) 15 MG tablet Take 1 tablet (15 mg total) by mouth once daily.    metFORMIN (GLUCOPHAGE) 500 MG tablet TAKE 2 TABLETS BY MOUTH TWICE A DAY WITH MEALS    multivitamin (THERAGRAN) per tablet Take 1 tablet by mouth once daily.    mupirocin (BACTROBAN) 2 % ointment Apply topically 3 (three) times daily. (Patient not taking: Reported on 5/2/2024)    OMNIPAQUE 300 300 mg iodine/mL injection  (Patient not taking: Reported on 5/2/2024)    OMNIPAQUE 350 350 mg iodine/mL Soln injection  (Patient not taking: Reported on 5/2/2024)    ondansetron (ZOFRAN-ODT) 8 MG TbDL Take 1 tablet (8 mg total) by mouth every 8 (eight) hours as needed (nausea/vomiting). Take 1 tablet (8 mg) by mouth every 8 hours as needed for nausea/vomiting.    OPDIVO 120 mg/12 mL Soln  (Patient not taking: Reported  on 2024)    OPDIVO 240 mg/24 mL Soln  (Patient not taking: Reported on 2024)    OPDIVO 40 mg/4 mL injection  (Patient not taking: Reported on 2024)    palonosetron (ALOXI) 0.25 mg/5 mL injection     pantoprazole (PROTONIX) 40 MG tablet TAKE 1 TABLET BY MOUTH EVERY DAY    READI-CAT 2 2 % (w/v) suspension     READI-CAT 2 2.1 % (w/v), 2.0 % (w/w) suspension     semaglutide (OZEMPIC) 0.25 mg or 0.5 mg(2 mg/1.5 mL) pen injector Inject 0.5 mg into the skin every 7 days. (Patient not taking: Reported on 2024)    sertraline (ZOLOFT) 100 MG tablet TAKE 1 TABLET BY MOUTH EVERY DAY IN THE EVENING    tiotropium (SPIRIVA WITH HANDIHALER) 18 mcg inhalation capsule INHALE THE CONTENTS OF 1 CAPSULE BY MOUTH EVERY DAY    triamcinolone acetonide 0.1% (KENALOG) 0.1 % cream APPLY TOPICALLY TWICE A DAY (Patient not taking: Reported on 2024)    WIXELA INHUB 250-50 mcg/dose diskus inhaler INHALE 1 PUFF INTO THE LUNGS 2 (TWO) TIMES DAILY. CONTROLLER    YERVOY 50 mg/10 mL (5 mg/mL)  (Patient not taking: Reported on 2024)     Family History       Problem Relation (Age of Onset)    Cataracts Mother, Father    Clotting disorder Brother    Kidney disease Mother, Father    Macular degeneration Maternal Grandmother    No Known Problems Daughter, Son, Sister, Maternal Aunt, Maternal Uncle, Paternal Aunt, Paternal Uncle, Maternal Grandfather, Paternal Grandmother, Paternal Grandfather          Tobacco Use    Smoking status: Former     Current packs/day: 0.00     Average packs/day: 0.5 packs/day for 40.0 years (20.0 ttl pk-yrs)     Types: Cigarettes     Start date: 1976     Quit date: 2016     Years since quittin.0     Passive exposure: Past    Smokeless tobacco: Never   Substance and Sexual Activity    Alcohol use: Yes     Alcohol/week: 1.0 standard drink of alcohol     Types: 1 Glasses of wine per week     Comment: rare- holiday    Drug use: No    Sexual activity: Not Currently     Partners: Male     Comment:       Review of Systems   Constitutional:  Negative for chills and fever.   Respiratory:  Negative for shortness of breath.    Cardiovascular:  Negative for chest pain.   Gastrointestinal:  Negative for abdominal pain, diarrhea, nausea and vomiting.   Genitourinary:  Negative for dysuria and hematuria.   Musculoskeletal:  Positive for arthralgias (left hip and left knee) and joint swelling (left knee).   Neurological:  Negative for dizziness, syncope and light-headedness.     Objective:     Vital Signs (Most Recent):  Temp: 98.5 °F (36.9 °C) (08/21/24 0758)  Pulse: 97 (08/21/24 1028)  Resp: 20 (08/21/24 0838)  BP: (!) 164/75 (08/21/24 0758)  SpO2: 95 % (08/21/24 0758) Vital Signs (24h Range):  Temp:  [97.4 °F (36.3 °C)-98.5 °F (36.9 °C)] 98.5 °F (36.9 °C)  Pulse:  [] 97  Resp:  [16-24] 20  SpO2:  [94 %-100 %] 95 %  BP: (126-167)/() 164/75     Weight: 92.8 kg (204 lb 9.4 oz)  Body mass index is 37.42 kg/m².     Physical Exam  Vitals reviewed.   Constitutional:       General: She is awake. She is not in acute distress.     Appearance: Normal appearance. She is not ill-appearing or diaphoretic.   Cardiovascular:      Rate and Rhythm: Normal rate.      Heart sounds: Normal heart sounds. No murmur heard.     No friction rub. No gallop.      Comments: Trace BLE edema  Pulmonary:      Effort: Pulmonary effort is normal. No accessory muscle usage or respiratory distress.      Breath sounds: Normal breath sounds. No wheezing, rhonchi or rales.   Abdominal:      General: Bowel sounds are normal.      Palpations: Abdomen is soft.      Tenderness: There is no abdominal tenderness. There is no guarding or rebound.   Musculoskeletal:      Comments: Nbandages to LLE. Left hand with iv infiltrated and swelling in hand. Iv in left forearm without infiltration signs.   Skin:     General: Skin is warm and dry.   Neurological:      Mental Status: She is alert and oriented to person, place, and time.   Psychiatric:          Behavior: Behavior is cooperative.                Significant Labs: All pertinent labs within the past 24 hours have been reviewed.  CBC:   Recent Labs   Lab 08/20/24  0240 08/20/24  1621 08/21/24  0325   WBC 6.01 9.69 7.36   HGB 9.2* 9.4* 8.4*   HCT 31.5* 32.2* 29.3*    255 239     CMP:   Recent Labs   Lab 08/20/24  0240 08/21/24  0325    135*   K 4.8 4.8    101   CO2 25 24   * 154*   BUN 26* 22   CREATININE 1.1 0.9   CALCIUM 10.4 9.0   PROT 6.9 6.5   ALBUMIN 2.8* 2.6*   BILITOT 0.3 0.3   ALKPHOS 62 65   AST 14 20   ALT 10 13   ANIONGAP 8 10       Significant Imaging:   Imaging Results               CT Hip Without Contrast Left (Final result)  Result time 08/19/24 01:06:29      Final result by Marlene Herrera MD (08/19/24 01:06:29)                   Impression:      As above described.    This report was flagged in Epic as abnormal.      Electronically signed by: Marlene Herrera  Date:    08/19/2024  Time:    01:06               Narrative:    EXAMINATION:  CT ABDOMEN PELVIS WITHOUT    CLINICAL HISTORY:  Left hip pain.    TECHNIQUE:  1.25 mm unenhanced axial images from the lung bases through the greater trochanters were performed.  Coronal and sagittal reformatted images were provided.    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  There is no hip dislocation.  There is no displaced fracture.  Degenerative changes are seen in the visualized lower lumbar spine.                                       X-Ray Hip 2 or 3 views Left with Pelvis when performed (Final result)  Result time 08/18/24 22:51:12      Final result by Marlene Herrera MD (08/18/24 22:51:12)                   Impression:      As above described.      Electronically signed by: Marlene Herrera  Date:    08/18/2024  Time:    22:51               Narrative:    EXAMINATION:  XR HIP WITH  PELVIS WHEN PERFORMED 2 OR THREE VIEWS LEFT    CLINICAL HISTORY:  Pain in left hip    TECHNIQUE:  AP and lateral view of the left hip    COMPARISON:  None.    FINDINGS:  AP pelvis and two views of the left hip demonstrate an oblique lucency in the left intertrochanteric region.  Finding may represent artifact versus nondisplaced fracture.  There is mild degenerative changes at the hip joints.

## 2024-08-21 NOTE — PT/OT/SLP EVAL
Occupational Therapy   Evaluation    Name: Hannah Montoya  MRN: [id number]  Admitting Diagnosis: Left hip pain  Recent Surgery: Procedure(s) (LRB):  INSERTION, INTRAMEDULLARY ANTOINE, FEMUR (Left)  BIOPSY, FEMUR (Left) 1 Day Post-Op    Recommendations:     Discharge Recommendations: Low Intensity Therapy  Discharge Equipment Recommendations:  none  Barriers to discharge:  None    CO-TX with PT for pt safety and max participation with both disciplines.  Assessment:     Hannah Montoya is a 68 y.o. female with a medical diagnosis of Left hip pain.  She presents with the following performance deficits affecting function: weakness, impaired self care skills, impaired functional mobility, gait instability, impaired balance, pain, orthopedic precautions, impaired endurance, decreased lower extremity function, decreased safety awareness.  PTA, pt reports being indp with ADLs and mod I with mobility. Upon eval, pt requires min - mod A for transfers and standing ADLs. Pt needs cueing for safety with ambulation and to breathe through pain vs holding breath.    Rehab Prognosis: Good; patient would benefit from acute skilled OT services to address these deficits and reach maximum level of function.       Plan:     Patient to be seen 4 x/week to address the above listed problems via self-care/home management, therapeutic activities, therapeutic exercises  Plan of Care Expires: 09/20/24  Plan of Care Reviewed with: patient    Subjective     Chief Complaint: L hip pain  Patient/Family Comments/goals: return home    Occupational Profile:  Living Environment: Lives with Son and his girlfriend, in [facility], 0 DENISE and no hand rail, tub/shower in bathroom  Previous level of function: Indp  Roles and Routines: Mother  Equipment Used at Home: glucometer, cane, straight, bedside commode, rollator, shower chair (transport chair)  Assistance upon Discharge: Son    Pain/Comfort:  Pain Rating 1: 4/10  Location - Side 1: Left  Location 1:  hip  Pain Addressed 1: Pre-medicate for activity, Reposition, Distraction    Patients cultural, spiritual, Mu-ism conflicts given the current situation: no    Objective:     Communicated with: Nsg prior to session.  Patient found supine with peripheral IV, lozano catheter, telemetry, FCD, SCD upon OT entry to room.    General Precautions: Standard, fall  Orthopedic Precautions: LLE weight bearing as tolerated  Braces: N/A  Respiratory Status: Room air    Occupational Performance:    Bed Mobility:    Patient completed Supine to Sit with moderate assistance    Functional Mobility/Transfers:  Patient completed Sit <> Stand Transfer with moderate assistance  with  rolling walker   Chair t/f using RW c/ CGA  Functional Mobility: Pt able to ambulate room level c/ min A and verbal cues using RW to simulate safe home  and visual scanning needed for ADL and IADL participation     Activities of Daily Living:  Grooming: stand by assistance oral care standing at sink  Upper Body Dressing: moderate assistance change gown and don gown for backside    Cognitive/Visual Perceptual:  A&O x4    Physical Exam:  BUE WNL  Balance: Fair +    AMPAC 6 Click ADL:  AMPAC Total Score: 19    Treatment & Education:  Pt educated on role and purpose of therapy  Pt educated on goal setting  Pt educated on benefits of OOB activity  Pt educated on self advocacy   Pt educated on LE WB status    Patient left up in chair with all lines intact, call button in reach, nsg notified, and son present    GOALS:   Multidisciplinary Problems       Occupational Therapy Goals       Not on file                    History:     Past Medical History:   Diagnosis Date    AICD (automatic cardioverter/defibrillator) present     Asthma     bronchitis in past    Breast cancer 2016    right    Cardiac pacemaker     Cardiomyopathy     COPD (chronic obstructive pulmonary disease)     Diabetes mellitus, type 2     Hyperglycemia     Hyperlipidemia     Hypertension      Malignant neoplasm of overlapping sites of female breast 2016    Nuclear sclerosis of both eyes 2020    Respiratory distress 3/12/2020         Past Surgical History:   Procedure Laterality Date    BIOPSY, WITH CT GUIDANCE Right 2023    Procedure: LUNG MASS BIOPSY, WITH CT GUIDANCE;  Surgeon: Yehuda Green MD;  Location: [facility] CATH LAB;  Service: Radiology;  Laterality: Right;    BREAST BIOPSY Right 2016    IDC    BREAST LUMPECTOMY Right     CARDIAC CATHETERIZATION Bilateral 2017    CARDIAC DEFIBRILLATOR PLACEMENT Left 08/10/2017    CARDIAC DEFIBRILLATOR PLACEMENT Left 2018     SECTION      x2    CHOLECYSTECTOMY      FEMUR BIOPSY Left 2024    Procedure: BIOPSY, FEMUR;  Surgeon: Porter Campos MD;  Location: [address];  Service: Orthopedics;  Laterality: Left;    INSERTION OF TUNNELED CENTRAL VENOUS CATHETER (CVC) WITH SUBCUTANEOUS PORT Right 2020    Procedure: [id number], RIGHT;  Surgeon: Josefa Caceres MD;  Location: [address];  Service: General;  Laterality: Right;  Port-a-cath placed to R. IJ    INTRAMEDULLARY RODDING OF FEMUR Left 2024    Procedure: INSERTION, INTRAMEDULLARY ANTOINE, FEMUR;  Surgeon: Porter Campos MD;  Location: [address];  Service: Orthopedics;  Laterality: Left;    LUNG BIOPSY N/A 2020    Procedure: BIOPSY, LUNG;  Surgeon: Kalpesh Diagnostic Provider;  Location: [facility] OR;  Service: Radiology;  Laterality: N/A;  8AM START  RN PREOP 2020---COVID NEGATIVE    NAVIGATIONAL BRONCHOSCOPY N/A 10/13/2020    Procedure: BRONCHOSCOPY, NAVIGATIONAL;  Surgeon: Jamilah Weaver MD;  Location: [facility] OR [address] [place] [address];  Service: Pulmonary;  Laterality: N/A;    REVISION OF IMPLANTABLE CARDIOVERTER-DEFIBRILLATOR (ICD) ELECTRODE LEAD PLACEMENT N/A 6/15/2018    Procedure: REVISION-LEAD-ICD;  Surgeon: Javy Gates MD;  Location: [facility] CATH LAB;  Service: Cardiology;  Laterality: N/A;  LBBB, Bi-V ICD HIS  Elmo jordan, MDT, Choice, MB, 3 Prep    ROBOT-ASSISTED LAPAROSCOPIC LYMPHADENECTOMY USING DA SALVADOR XI Right 10/23/2020    Procedure: XI ROBOTIC LYMPHADENECTOMY;  Surgeon: Ramo Tucker MD;  Location: [facility] OR [address] [place] [address];  Service: Thoracic;  Laterality: Right;    TUBAL LIGATION         Time Tracking:     OT Date of Treatment: 08/21/24  OT Start Time: 1028  OT Stop Time: 1100  OT Total Time (min): 32 min    Billable Minutes:Evaluation 8  Self Care/Home Management 31    8/21/2024

## 2024-08-21 NOTE — PLAN OF CARE
No significant events during this shift. Pt VSS and afebrile throughout shift. Pt free of skin breakdown. Pt dressing is CDI.  Pt pain has been moderately controlled by PO pain meds and tolerated well. Pt has been eating and voiding adequately throughout shift. Pt sat up in chair this shift. Pt has call light in reach, bed brakes on, side rails up x2, bed in low position, SCDs on, IS at bedside, and nonskid socks on. Pt lying in bed in no distress. Purposeful rounding was preformed during this shift.  Problem: Infection  Goal: Absence of Infection Signs and Symptoms  Outcome: Progressing     Problem: Skin Injury Risk Increased  Goal: Skin Health and Integrity  Outcome: Progressing     Problem: Hip Fracture Medical Management  Goal: Optimal Coping with Change in Health Status  Outcome: Progressing  Goal: Absence of Bleeding  Outcome: Progressing  Goal: Effective Bowel Elimination  Outcome: Progressing  Goal: Baseline Cognitive Function Maintained  Outcome: Progressing  Goal: Absence of Embolism  Outcome: Progressing  Goal: Fracture Stability  Outcome: Progressing  Goal: Optimal Functional Performance  Outcome: Progressing  Goal: Pain Control and Function  Outcome: Progressing  Goal: Effective Urinary Elimination  Outcome: Progressing     Problem: Surgery Nonspecified  Goal: Absence of Bleeding  Outcome: Progressing  Goal: Effective Bowel Elimination  Outcome: Progressing  Goal: Fluid and Electrolyte Balance  Outcome: Progressing  Goal: Blood Glucose Level Within Targeted Range  Outcome: Progressing  Goal: Absence of Infection Signs and Symptoms  Outcome: Progressing  Goal: Anesthesia/Sedation Recovery  Outcome: Progressing  Goal: Optimal Pain Control and Function  Outcome: Progressing  Goal: Nausea and Vomiting Relief  Outcome: Progressing  Goal: Effective Urinary Elimination  Outcome: Progressing  Goal: Effective Oxygenation and Ventilation  Outcome: Progressing     Problem: Anesthesia/Analgesia, Neuraxial  Goal:  Safe, Effective Infusion Delivery  Outcome: Progressing  Goal: Absence of Infection Signs and Symptoms  Outcome: Progressing  Goal: Nausea and Vomiting Relief  Outcome: Progressing  Goal: Effective Pain Control  Outcome: Progressing  Goal: Effective Oxygenation and Ventilation  Outcome: Progressing  Goal: Baseline Motor Function  Outcome: Progressing  Goal: Effective Urinary Elimination  Outcome: Progressing     Problem: Wound  Goal: Optimal Coping  Outcome: Progressing  Goal: Optimal Functional Ability  Outcome: Progressing  Goal: Absence of Infection Signs and Symptoms  Outcome: Progressing  Goal: Improved Oral Intake  Outcome: Progressing  Goal: Optimal Pain Control and Function  Outcome: Progressing  Goal: Skin Health and Integrity  Outcome: Progressing  Goal: Optimal Wound Healing  Outcome: Progressing

## 2024-08-21 NOTE — PLAN OF CARE
Problem: Physical Therapy  Goal: Physical Therapy Goal  Description: PT goals until 9/2/24    1. Pt supine to sit with CGA-not met  2. Pt sit to supine with CGA-not met  3. Pt sit to stand with RW with WBAT L LE with CGA-not met  4. Pt to perform gait 100ft with RW with WBAT L LE with CGA.-not met  5. Pt to go up/down curb step with RW with WBAT L LE with minimal assist.-not met  6. Pt to perform B LE exs in sitting or supine x 10 reps to strengthen B LE to improve functional mobility.-not met    DME Justifications (see above for complete DME recommendations)    Bedside Commode- Patient has a mobility limitation that significantly impairs their ability to participate in one or more mobility related activities of daily living, including toileting. This deficit can be resolved by using a bedside commode. Patient demonstrates mobility limitations that will cause them to be confined to one room at home without bathroom access for up to 30 days. Using a bedside commode will greatly improve the patient's ability to participate in MRADLs.     Rolling Walker- Patient demonstrates a mobility limitation that significantly impairs their ability to participate in one or more mobility related activities of daily living. Patient's mobility limitation cannot be sufficiently resolved with the use of a cane, but can be sufficiently resolved with the use of a rolling walker.The use of a rolling walker will considerably improve their ability to participate in MRADLs. Patient will use the walker on a regular basis at home.      Outcome: Progressing   Pt's goals set and pt will benefit from skilled PT services to work towards improved functional mobility including: bed mobility, transfers, and gait.  Elizabeth Abdi PT  8/21/2024

## 2024-08-21 NOTE — ANESTHESIA POSTPROCEDURE EVALUATION
Anesthesia Post Evaluation    Patient: Hannah Montoya    Procedure(s) Performed: Procedure(s) (LRB):  INSERTION, INTRAMEDULLARY ANTOINE, FEMUR (Left)  BIOPSY, FEMUR (Left)    Final Anesthesia Type: general      Patient location during evaluation: PACU  Patient participation: Yes- Able to Participate  Level of consciousness: awake and alert  Post-procedure vital signs: reviewed and stable  Pain management: adequate  Airway patency: patent  KALLIE mitigation strategies: Multimodal analgesia, Preoperative use of mandibular advancement devices or oral appliances, Intraoperative administration of CPAP, nasopharyngeal airway, or oral appliance during sedation, Extubation while patient is awake and Verification of full reversal of neuromuscular block  PONV status at discharge: No PONV  Anesthetic complications: no      Cardiovascular status: blood pressure returned to baseline, hemodynamically stable and stable  Respiratory status: unassisted and spontaneous ventilation  Hydration status: euvolemic  Follow-up not needed.              Vitals Value Taken Time   /56 08/21/24 1218   Temp 36.8 °C (98.2 °F) 08/21/24 1218   Pulse 91 08/21/24 1218   Resp 20 08/21/24 1218   SpO2 90 % 08/21/24 1218         Event Time   Out of Recovery 08/20/2024 15:45:00         Pain/James Score: Pain Rating Prior to Med Admin: 3 (8/21/2024 12:25 PM)  Pain Rating Post Med Admin: 4 (8/20/2024  4:23 PM)  James Score: 9 (8/20/2024  3:45 PM)

## 2024-08-22 ENCOUNTER — PATIENT MESSAGE (OUTPATIENT)
Dept: ELECTROPHYSIOLOGY | Facility: CLINIC | Age: 69
End: 2024-08-22
Payer: MEDICARE

## 2024-08-22 ENCOUNTER — PATIENT MESSAGE (OUTPATIENT)
Dept: ADMINISTRATIVE | Facility: OTHER | Age: 69
End: 2024-08-22
Payer: MEDICARE

## 2024-08-22 LAB
ALBUMIN SERPL BCP-MCNC: 2.4 G/DL (ref 3.5–5.2)
ALP SERPL-CCNC: 74 U/L (ref 55–135)
ALT SERPL W/O P-5'-P-CCNC: 11 U/L (ref 10–44)
ANION GAP SERPL CALC-SCNC: 7 MMOL/L (ref 8–16)
AST SERPL-CCNC: 15 U/L (ref 10–40)
BASOPHILS # BLD AUTO: 0.04 K/UL (ref 0–0.2)
BASOPHILS NFR BLD: 0.6 % (ref 0–1.9)
BILIRUB SERPL-MCNC: 0.2 MG/DL (ref 0.1–1)
BUN SERPL-MCNC: 19 MG/DL (ref 8–23)
CALCIUM SERPL-MCNC: 9.1 MG/DL (ref 8.7–10.5)
CHLORIDE SERPL-SCNC: 103 MMOL/L (ref 95–110)
CO2 SERPL-SCNC: 28 MMOL/L (ref 23–29)
CREAT SERPL-MCNC: 1 MG/DL (ref 0.5–1.4)
DIFFERENTIAL METHOD BLD: ABNORMAL
EOSINOPHIL # BLD AUTO: 0.1 K/UL (ref 0–0.5)
EOSINOPHIL NFR BLD: 2 % (ref 0–8)
ERYTHROCYTE [DISTWIDTH] IN BLOOD BY AUTOMATED COUNT: 17.8 % (ref 11.5–14.5)
EST. GFR  (NO RACE VARIABLE): >60 ML/MIN/1.73 M^2
GLUCOSE SERPL-MCNC: 220 MG/DL (ref 70–110)
HCT VFR BLD AUTO: 26.4 % (ref 37–48.5)
HGB BLD-MCNC: 7.8 G/DL (ref 12–16)
IMM GRANULOCYTES # BLD AUTO: 0.08 K/UL (ref 0–0.04)
IMM GRANULOCYTES NFR BLD AUTO: 1.1 % (ref 0–0.5)
LYMPHOCYTES # BLD AUTO: 0.6 K/UL (ref 1–4.8)
LYMPHOCYTES NFR BLD: 9 % (ref 18–48)
MAGNESIUM SERPL-MCNC: 1.7 MG/DL (ref 1.6–2.6)
MCH RBC QN AUTO: 23 PG (ref 27–31)
MCHC RBC AUTO-ENTMCNC: 29.5 G/DL (ref 32–36)
MCV RBC AUTO: 78 FL (ref 82–98)
MONOCYTES # BLD AUTO: 0.7 K/UL (ref 0.3–1)
MONOCYTES NFR BLD: 10.3 % (ref 4–15)
NEUTROPHILS # BLD AUTO: 5.4 K/UL (ref 1.8–7.7)
NEUTROPHILS NFR BLD: 77 % (ref 38–73)
NRBC BLD-RTO: 0 /100 WBC
PHOSPHATE SERPL-MCNC: 2.2 MG/DL (ref 2.7–4.5)
PLATELET # BLD AUTO: 191 K/UL (ref 150–450)
PMV BLD AUTO: 11.1 FL (ref 9.2–12.9)
POCT GLUCOSE: 181 MG/DL (ref 70–110)
POCT GLUCOSE: 201 MG/DL (ref 70–110)
POCT GLUCOSE: 207 MG/DL (ref 70–110)
POCT GLUCOSE: 223 MG/DL (ref 70–110)
POTASSIUM SERPL-SCNC: 4.9 MMOL/L (ref 3.5–5.1)
PROT SERPL-MCNC: 6.4 G/DL (ref 6–8.4)
RBC # BLD AUTO: 3.39 M/UL (ref 4–5.4)
SODIUM SERPL-SCNC: 138 MMOL/L (ref 136–145)
WBC # BLD AUTO: 6.98 K/UL (ref 3.9–12.7)

## 2024-08-22 PROCEDURE — 84100 ASSAY OF PHOSPHORUS: CPT | Mod: HCNC | Performed by: HOSPITALIST

## 2024-08-22 PROCEDURE — 80053 COMPREHEN METABOLIC PANEL: CPT | Mod: HCNC | Performed by: HOSPITALIST

## 2024-08-22 PROCEDURE — 25000003 PHARM REV CODE 250: Mod: HCNC | Performed by: HOSPITALIST

## 2024-08-22 PROCEDURE — 21400001 HC TELEMETRY ROOM: Mod: HCNC

## 2024-08-22 PROCEDURE — 83735 ASSAY OF MAGNESIUM: CPT | Mod: HCNC | Performed by: HOSPITALIST

## 2024-08-22 PROCEDURE — 25000242 PHARM REV CODE 250 ALT 637 W/ HCPCS: Mod: HCNC | Performed by: HOSPITALIST

## 2024-08-22 PROCEDURE — 36415 COLL VENOUS BLD VENIPUNCTURE: CPT | Mod: HCNC | Performed by: HOSPITALIST

## 2024-08-22 PROCEDURE — 97535 SELF CARE MNGMENT TRAINING: CPT | Mod: HCNC,CO

## 2024-08-22 PROCEDURE — 85025 COMPLETE CBC W/AUTO DIFF WBC: CPT | Mod: HCNC | Performed by: HOSPITALIST

## 2024-08-22 PROCEDURE — 97116 GAIT TRAINING THERAPY: CPT | Mod: HCNC

## 2024-08-22 PROCEDURE — 25000003 PHARM REV CODE 250: Mod: HCNC

## 2024-08-22 PROCEDURE — 97530 THERAPEUTIC ACTIVITIES: CPT | Mod: HCNC,CO

## 2024-08-22 RX ADMIN — SENNOSIDES AND DOCUSATE SODIUM 1 TABLET: 50; 8.6 TABLET ORAL at 08:08

## 2024-08-22 RX ADMIN — PANTOPRAZOLE SODIUM 40 MG: 40 TABLET, DELAYED RELEASE ORAL at 08:08

## 2024-08-22 RX ADMIN — FLUTICASONE FUROATE AND VILANTEROL TRIFENATATE 1 PUFF: 200; 25 POWDER RESPIRATORY (INHALATION) at 08:08

## 2024-08-22 RX ADMIN — POLYETHYLENE GLYCOL 3350 17 G: 17 POWDER, FOR SOLUTION ORAL at 08:08

## 2024-08-22 RX ADMIN — APIXABAN 2.5 MG: 2.5 TABLET, FILM COATED ORAL at 08:08

## 2024-08-22 RX ADMIN — Medication 400 MG: at 08:08

## 2024-08-22 RX ADMIN — CHOLECALCIFEROL TAB 25 MCG (1000 UNIT) 2000 UNITS: 25 TAB at 08:08

## 2024-08-22 RX ADMIN — ACETAMINOPHEN 1000 MG: 500 TABLET ORAL at 08:08

## 2024-08-22 RX ADMIN — METHOCARBAMOL 750 MG: 750 TABLET ORAL at 05:08

## 2024-08-22 RX ADMIN — TIOTROPIUM BROMIDE INHALATION SPRAY 2 PUFF: 3.12 SPRAY, METERED RESPIRATORY (INHALATION) at 08:08

## 2024-08-22 RX ADMIN — SERTRALINE 100 MG: 100 TABLET, FILM COATED ORAL at 08:08

## 2024-08-22 RX ADMIN — INSULIN ASPART 2 UNITS: 100 INJECTION, SOLUTION INTRAVENOUS; SUBCUTANEOUS at 06:08

## 2024-08-22 RX ADMIN — METHOCARBAMOL 750 MG: 750 TABLET ORAL at 11:08

## 2024-08-22 RX ADMIN — METHOCARBAMOL 750 MG: 750 TABLET ORAL at 01:08

## 2024-08-22 RX ADMIN — ATORVASTATIN CALCIUM 10 MG: 10 TABLET, FILM COATED ORAL at 08:08

## 2024-08-22 RX ADMIN — METOPROLOL SUCCINATE 100 MG: 100 TABLET, EXTENDED RELEASE ORAL at 08:08

## 2024-08-22 RX ADMIN — AMLODIPINE BESYLATE 5 MG: 5 TABLET ORAL at 08:08

## 2024-08-22 RX ADMIN — CETIRIZINE HYDROCHLORIDE 10 MG: 10 TABLET, FILM COATED ORAL at 08:08

## 2024-08-22 RX ADMIN — CELECOXIB 200 MG: 200 CAPSULE ORAL at 08:08

## 2024-08-22 RX ADMIN — THERA TABS 1 TABLET: TAB at 09:08

## 2024-08-22 RX ADMIN — LOSARTAN POTASSIUM 100 MG: 25 TABLET, FILM COATED ORAL at 08:08

## 2024-08-22 RX ADMIN — ACETAMINOPHEN 1000 MG: 500 TABLET ORAL at 05:08

## 2024-08-22 RX ADMIN — GABAPENTIN 300 MG: 300 CAPSULE ORAL at 08:08

## 2024-08-22 RX ADMIN — BUPROPION HYDROCHLORIDE 150 MG: 150 TABLET, FILM COATED, EXTENDED RELEASE ORAL at 08:08

## 2024-08-22 RX ADMIN — GABAPENTIN 300 MG: 300 CAPSULE ORAL at 05:08

## 2024-08-22 RX ADMIN — ACETAMINOPHEN 1000 MG: 500 TABLET ORAL at 01:08

## 2024-08-22 NOTE — PT/OT/SLP PROGRESS
Occupational Therapy   Treatment    Name: Hannah Montoya  MRN: [id number]  Admitting Diagnosis:  Lytic bone lesion of left femur  2 Days Post-Op    Recommendations:     Discharge Recommendations: Low Intensity Therapy  Discharge Equipment Recommendations:  none  Barriers to discharge:       Assessment:     Hannah Montoya is a 68 y.o. female with a medical diagnosis of Lytic bone lesion of left femur.  She presents with the following performance deficits affecting function: weakness, impaired endurance, impaired functional mobility, gait instability, impaired self care skills, decreased lower extremity function, orthopedic precautions, decreased ROM, impaired cardiopulmonary response to activity. Oxygen doffed with ambulation and ADLs and patient maintaining in 90s. Nurse notified that the patient feels comfortable without oxygen donned    Rehab Prognosis:  Good; patient would benefit from acute skilled OT services to address these deficits and reach maximum level of function.       Plan:     Patient to be seen 4 x/week to address the above listed problems via self-care/home management, therapeutic activities, therapeutic exercises  Plan of Care Expires: 09/20/24  Plan of Care Reviewed with: patient    Subjective     Patient/Family Comments/goals: to improve function  Pain/Comfort:  Pain Rating 1: 3/10  Location - Side 1: Left  Location - Orientation 1: generalized  Location 1: hip  Pain Addressed 1: Reposition, Distraction  Pain Rating Post-Intervention 1: 3/10    Objective:     Communicated with: RN prior to session.  Patient found up in chair with telemetry upon OT entry to room.  A client care conference was completed by the OTR and the GILES prior to treatment by the GILES to discuss the patient's POC and current status.    General Precautions: Standard, fall    Orthopedic Precautions:LLE weight bearing as tolerated  Braces: N/A  Respiratory Status: Nasal cannula, flow 2 L/min     Occupational  Performance:     Bed Mobility:    Up in chair     Functional Mobility/Transfers:  Patient completed Sit <> Stand Transfer with contact guard assistance  with  rolling walker   Functional Mobility: pt ambulating ~20ft x 2 trials with CGA using RW. No LOB or SOB noted. Extra time for ambulation d/t discomfort    Activities of Daily Living:  Grooming: stand by assistance standing at sink for oral hygiene and facial care      [facility] 6 Click ADL: 20    Treatment & Education:  Pt educated on OT POC and frequency during hospital stay.   Pt educated on importance of OOB activity to improve function and activity tolerance.  Pt educated on proper hand placement and techniques for RW mgmt to improve safety awareness.   Addressed all patient questions/concerns within GILES scope of practice.     Patient left up in chair with all lines intact, call button in reach, RN notified, and son present    GOALS:   Multidisciplinary Problems       Occupational Therapy Goals          Problem: Occupational Therapy    Goal Priority Disciplines Outcome Interventions   Occupational Therapy Goal     OT, PT/OT Progressing    Description: Goals to be met by: 9/20/24     Patient will increase functional independence with ADLs by performing:    UE Dressing with Supervision.  LE Dressing with Stand-by Assistance.  Toileting from toilet with Supervision for hygiene and clothing management.   All functional transfers performed with SBA                         Time Tracking:     OT Date of Treatment: 08/22/24  OT Start Time: 1430  OT Stop Time: 1454  OT Total Time (min): 24 min    Billable Minutes:Self Care/Home Management 14  Therapeutic Activity 10    OT/MARA: MARA     Number of MARA visits since last OT visit: 1    8/22/2024

## 2024-08-22 NOTE — PLAN OF CARE
08/22/24 0850   Post-Acute Status   Post-Acute Authorization Home Health   Home Health Status Referrals Sent   Discharge Plan   Discharge Plan A Home Health;Home with family     SW met with the patient at bedside to discuss discharge plan. Pt agreeable to HH, reports no preference. Pt also reports having a RW and BSC at home, pt's son will provide transportation upon discharge.     SW faxed referral to [facility] via [facility] for review. SW will follow up as needed.    [facility] - Accepted, pending  order.    Kelsie Oseguera LMSW  Case Management   [facility]   Ext. [phone]

## 2024-08-22 NOTE — ASSESSMENT & PLAN NOTE
Hannah Montoya is a 68 y.o. female with PMH of non-small cell lung cancer (treated w/ chemo & radiation), chronic liver disease, diabetes, hypertension, hyperlipidemia, COPD, radiation pneumonitis, breast cancer, dilated cardiomyopathy, and pacemaker placement presenting as a transfer from [facility] for orthopaedic oncology evaluation after presenting for 3 week history of pain in the left hip radiating to left knee that seems to be worsening. Imaging of the left hip (XR & CT) with oblique osteolytic lesion w/ cortical destruction of the left intertrochanteric region of femur.     S/p L IMN 8/20/24    -Admitted to medicine  -Pain: multimodal regimen  -DVT Ppx: Eliquis 2.5 BID   -WBAT left lower extremity     Dispo: stable for dc from orthopedic perspective

## 2024-08-22 NOTE — PT/OT/SLP PROGRESS
Physical Therapy Treatment    Patient Name:  Hannah Montoya   MRN:  [id number]    Recommendations:     Discharge Recommendations: Low Intensity Therapy  Discharge Equipment Recommendations: bedside commode, walker, rolling  Barriers to discharge: None    Assessment:     Hannah Montoya is a 68 y.o. female admitted with a medical diagnosis of Lytic bone lesion of left femur.  She presents with the following impairments/functional limitations: weakness, impaired endurance, impaired functional mobility, gait instability, impaired balance, impaired self care skills, orthopedic precautions, decreased lower extremity function, impaired cardiopulmonary response to activity     Pt agreeable and tolerated PT treatment fairly well. Pt amb ~36 ft with RW, CGA, and bedside chair follow this session. SpO2 assessed right after gait trial with pt stating around 80% but able to improve to 96% after sitting a few minuets.  Patient continues to demonstrate the need for low intensity therapy on a scheduled basis exhibited by decreased independence with self-care and functional mobility.    Rehab Prognosis: Good; patient would benefit from acute skilled PT services to address these deficits and reach maximum level of function.    Recent Surgery: Procedure(s) (LRB):  INSERTION, INTRAMEDULLARY ANTOINE, FEMUR (Left)  BIOPSY, FEMUR (Left) 2 Days Post-Op    Plan:     During this hospitalization, patient to be seen  (Hip pathway 8/21, 8/22, 8/23, then 5x/week after pathway copmplete.) to address the identified rehab impairments via gait training, therapeutic activities, therapeutic exercises, neuromuscular re-education and progress toward the following goals:    Plan of Care Expires:  09/20/24    Subjective     Chief Complaint: mild L hip pain  Patient/Family Comments/goals: Pt motivated to walk more today  Pain/Comfort:  Pain Rating 1: 4/10  Location - Side 1: Left  Location - Orientation 1: generalized  Location 1: hip  Pain  Addressed 1: Reposition, Distraction      Objective:     Communicated with RN prior to session.  Patient found up in chair with telemetry, FCD, SCD upon PT entry to room.     General Precautions: Standard, fall  Orthopedic Precautions: LLE weight bearing as tolerated  Braces: N/A  Respiratory Status: Nasal cannula, flow 2 L/min     Functional Mobility:     Bed Mobility:   N/T 2/2 pt sitting up in chair upon PT arrival    Transfers:   Sit <> Stand Transfer: contact guard assistance from bedside chair using rolling walker     Balance:   Sitting balance: FAIR+: Maintains balance through MINIMAL excursions of active trunk motion  Standing balance:   FAIR: Maintains without assist but unable to take challenges  FAIR: Needs CONTACT GUARD during gait                 Gait:  Distance: 36 ft with chair follow   Assistive Device: RW  Assistance Level: contact guard assistance  Gait Assessment: Pt amb with decreased devon, decreased step length, using step to gait pattern.   Pt able to sit in bedside chair at end of trial while assessing SpO2 and rolled back to room      AM-PAC 6 CLICK MOBILITY  Turning over in bed (including adjusting bedclothes, sheets and blankets)?: 3  Sitting down on and standing up from a chair with arms (e.g., wheelchair, bedside commode, etc.): 3  Moving from lying on back to sitting on the side of the bed?: 3  Moving to and from a bed to a chair (including a wheelchair)?: 3  Need to walk in hospital room?: 3  Climbing 3-5 steps with a railing?: 2  Basic Mobility Total Score: 17       Treatment & Education:  Pt educated on tip to reduce fall risk and safety with mobility and using call button for assistance from nursing staff with OOB mobility.  Pt educated on sitting up in chair throughout most of day  All questions answered within the scope of PT.  White board updated accordingly.    Patient left up in chair with all lines intact and call button in reach..    GOALS:   Multidisciplinary Problems        Physical Therapy Goals          Problem: Physical Therapy    Goal Priority Disciplines Outcome Goal Variances Interventions   Physical Therapy Goal     PT, PT/OT Progressing     Description: PT goals until 9/2/24    1. Pt supine to sit with CGA-not met  2. Pt sit to supine with CGA-not met  3. Pt sit to stand with RW with WBAT L LE with CGA-not met  4. Pt to perform gait 100ft with RW with WBAT L LE with CGA.-not met  5. Pt to go up/down curb step with RW with WBAT L LE with minimal assist.-not met  6. Pt to perform B LE exs in sitting or supine x 10 reps to strengthen B LE to improve functional mobility.-not met    DME Justifications (see above for complete DME recommendations)    Bedside Commode- Patient has a mobility limitation that significantly impairs their ability to participate in one or more mobility related activities of daily living, including toileting. This deficit can be resolved by using a bedside commode. Patient demonstrates mobility limitations that will cause them to be confined to one room at home without bathroom access for up to 30 days. Using a bedside commode will greatly improve the patient's ability to participate in MRADLs.     Rolling Walker- Patient demonstrates a mobility limitation that significantly impairs their ability to participate in one or more mobility related activities of daily living. Patient's mobility limitation cannot be sufficiently resolved with the use of a cane, but can be sufficiently resolved with the use of a rolling walker.The use of a rolling walker will considerably improve their ability to participate in MRADLs. Patient will use the walker on a regular basis at home.                             Time Tracking:     PT Received On: 08/22/24  PT Start Time: 1101     PT Stop Time: 1118  PT Total Time (min): 17 min     Billable Minutes: Gait Training 17    Treatment Type: Treatment  PT/PTA: PT           08/22/2024

## 2024-08-22 NOTE — SUBJECTIVE & OBJECTIVE
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 VSS. AF. Pain controlled. Tolerating PO, nausea from yesterday am resolved.  Ambulated 30 ft with PT yesterday, PT recommending low intensity therapy.    Review of patient's allergies indicates:   Allergen Reactions    Taxol [paclitaxel]      Hypersensitivity reaction to taxol, symptoms included shortness of breath, nausea, dizziness, flushing     Carboplatin Other (See Comments)     Itching and hives    Adhesive Rash     tegaderm burns and blistered skin       Current Facility-Administered Medications   Medication    acetaminophen tablet 1,000 mg    albuterol-ipratropium 2.5 mg-0.5 mg/3 mL nebulizer solution 3 mL    aluminum-magnesium hydroxide-simethicone 200-200-20 mg/5 mL suspension 30 mL    amLODIPine tablet 5 mg    apixaban tablet 2.5 mg    atorvastatin tablet 10 mg    benzonatate capsule 200 mg    bisacodyL suppository 10 mg    buPROPion TB24 tablet 150 mg    celecoxib capsule 200 mg    cetirizine tablet 10 mg    dextrose 10% bolus 125 mL 125 mL    dextrose 10% bolus 250 mL 250 mL    fluticasone furoate-vilanteroL 200-25 mcg/dose diskus inhaler 1 puff    gabapentin capsule 300 mg    glucagon (human recombinant) injection 1 mg    glucose chewable tablet 16 g    glucose chewable tablet 24 g    insulin aspart U-100 pen 0-10 Units    losartan tablet 100 mg    magnesium oxide tablet 400 mg    melatonin tablet 6 mg    methocarbamoL tablet 750 mg    metoprolol succinate (TOPROL-XL) 24 hr tablet 100 mg    morphine injection 2 mg    multivitamin tablet    naloxone 0.4 mg/mL injection 0.02 mg    ondansetron injection 4 mg    oxyCODONE immediate release tablet 5 mg    oxyCODONE immediate release tablet Tab 10 mg    pantoprazole EC tablet 40 mg    polyethylene glycol packet 17 g    senna-docusate 8.6-50 mg per tablet 1 tablet    senna-docusate 8.6-50 mg  "per tablet 1 tablet    sertraline tablet 100 mg    sodium chloride 0.9% flush 10 mL    tiotropium bromide 2.5 mcg/actuation inhaler 2 puff    vitamin D 1000 units tablet 2,000 Units     Facility-Administered Medications Ordered in Other Encounters   Medication    fentaNYL injection 25 mcg    haloperidol lactate injection 0.5 mg    HYDROmorphone injection 0.2 mg    ondansetron injection 4 mg    sodium chloride 0.9% flush 10 mL     Objective:     Vital Signs (Most Recent):  Temp: 96.3 °F (35.7 °C) (08/22/24 0827)  Pulse: 93 (08/22/24 0827)  Resp: 20 (08/22/24 0827)  BP: 129/60 (08/22/24 0827)  SpO2: 97 % (08/22/24 0827) Vital Signs (24h Range):  Temp:  [96.3 °F (35.7 °C)-98.6 °F (37 °C)] 96.3 °F (35.7 °C)  Pulse:  [78-97] 93  Resp:  [16-20] 20  SpO2:  [90 %-97 %] 97 %  BP: (116-129)/(55-63) 129/60     Weight: 92.8 kg (204 lb 9.4 oz)  Height: 5' 2" (157.5 cm)  Body mass index is 37.42 kg/m².      Intake/Output Summary (Last 24 hours) at 8/22/2024 0939  Last data filed at 8/21/2024 1228  Gross per 24 hour   Intake --   Output 1800 ml   Net -1800 ml        Ortho/SPM Exam     NAD, resting comfortably in bed  A&O x3   Breathing comfortably w/o distress   Extremities WWP     MSK:         LLE:  Inspection: Dressings clean, dry, and intact    Palpation: Appropriate post-op TTP of the lateral hip   Compartments soft and compressible.   ROM: Painless ROM ankle    Neuro: TA/Gastroc/EHL/FHL assessed in isolation without deficit.   SILT Sa/Shukla/DP/SP/T nerve distributions   Vascular: DP artery palpated 2+        Significant Labs: CBC:   Recent Labs   Lab 08/20/24  1621 08/21/24  0325   WBC 9.69 7.36   HGB 9.4* 8.4*   HCT 32.2* 29.3*    239     CMP:   Recent Labs   Lab 08/21/24  0325   *   K 4.8      CO2 24   *   BUN 22   CREATININE 0.9   CALCIUM 9.0   PROT 6.5   ALBUMIN 2.6*   BILITOT 0.3   ALKPHOS 65   AST 20   ALT 13   ANIONGAP 10     Coagulation:   No results for input(s): "LABPROT", "INR", "APTT" in the " last 48 hours.    All pertinent labs within the past 24 hours have been reviewed.    Significant Imaging: I have reviewed and interpreted all pertinent imaging results/findings.

## 2024-08-22 NOTE — HOSPITAL COURSE
"Orthopedics consulted for lucency noted to left hip on admit on X-ray and patient underwent CT scan of left hip that showed "An expansile osteolytic lesion with beginning cortical destruction is seen intertrochanteric region. There is no hip dislocation. There is no displaced fracture." Orthopedics recommended IM nail to left femur for impending fracture. Patient taken to OR on 8/20/2024 with Dr. Porter Campos and underwent IM nail to left femur. Post-op, patient is weight bear as tolerated to left lower extremity. Patient placed on multimodals for pain management. Patient placed on Apixiban 2.5 mg po BID for DVT prophylaxis and will need for 30 days. PT/OT consulted and patient progressed well and therapy recommending low intensity therapy on discharge when medically ready. Plan is home health PT/OT on discharge when medically ready. Patient's pain well controlled and ambulating well with walker with PT/OT so discharged home in good condition on 8/23.  PT/OT arranged on discharge. Patient needed no additional DME as had DME at home. Patient to follow-up with her oncologist as outpatient to discuss her cancer treatment. Surgical bandages to remain in place until Orthopedic clinic follow-up. Patient discharged on 30 days of Apixiban for DVT prophylaxis after hip surgery.   "

## 2024-08-22 NOTE — PROGRESS NOTES
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 VSS. AF. Pain controlled. Tolerating PO, nausea from yesterday am resolved.  Ambulated 30 ft with PT yesterday, PT recommending low intensity therapy.    Review of patient's allergies indicates:   Allergen Reactions    Taxol [paclitaxel]      Hypersensitivity reaction to taxol, symptoms included shortness of breath, nausea, dizziness, flushing     Carboplatin Other (See Comments)     Itching and hives    Adhesive Rash     tegaderm burns and blistered skin       Current Facility-Administered Medications   Medication    acetaminophen tablet 1,000 mg    albuterol-ipratropium 2.5 mg-0.5 mg/3 mL nebulizer solution 3 mL    aluminum-magnesium hydroxide-simethicone 200-200-20 mg/5 mL suspension 30 mL    amLODIPine tablet 5 mg    apixaban tablet 2.5 mg    atorvastatin tablet 10 mg    benzonatate capsule 200 mg    bisacodyL suppository 10 mg    buPROPion TB24 tablet 150 mg    celecoxib capsule 200 mg    cetirizine tablet 10 mg    dextrose 10% bolus 125 mL 125 mL    dextrose 10% bolus 250 mL 250 mL    fluticasone furoate-vilanteroL 200-25 mcg/dose diskus inhaler 1 puff    gabapentin capsule 300 mg    glucagon (human recombinant) injection 1 mg    glucose chewable tablet 16 g    glucose chewable tablet 24 g    insulin aspart U-100 pen 0-10 Units    losartan tablet 100 mg    magnesium oxide tablet 400 mg    melatonin tablet 6 mg    methocarbamoL tablet 750 mg  "   metoprolol succinate (TOPROL-XL) 24 hr tablet 100 mg    morphine injection 2 mg    multivitamin tablet    naloxone 0.4 mg/mL injection 0.02 mg    ondansetron injection 4 mg    oxyCODONE immediate release tablet 5 mg    oxyCODONE immediate release tablet Tab 10 mg    pantoprazole EC tablet 40 mg    polyethylene glycol packet 17 g    senna-docusate 8.6-50 mg per tablet 1 tablet    senna-docusate 8.6-50 mg per tablet 1 tablet    sertraline tablet 100 mg    sodium chloride 0.9% flush 10 mL    tiotropium bromide 2.5 mcg/actuation inhaler 2 puff    vitamin D 1000 units tablet 2,000 Units     Facility-Administered Medications Ordered in Other Encounters   Medication    fentaNYL injection 25 mcg    haloperidol lactate injection 0.5 mg    HYDROmorphone injection 0.2 mg    ondansetron injection 4 mg    sodium chloride 0.9% flush 10 mL     Objective:     Vital Signs (Most Recent):  Temp: 96.3 °F (35.7 °C) (08/22/24 0827)  Pulse: 93 (08/22/24 0827)  Resp: 20 (08/22/24 0827)  BP: 129/60 (08/22/24 0827)  SpO2: 97 % (08/22/24 0827) Vital Signs (24h Range):  Temp:  [96.3 °F (35.7 °C)-98.6 °F (37 °C)] 96.3 °F (35.7 °C)  Pulse:  [78-97] 93  Resp:  [16-20] 20  SpO2:  [90 %-97 %] 97 %  BP: (116-129)/(55-63) 129/60     Weight: 92.8 kg (204 lb 9.4 oz)  Height: 5' 2" (157.5 cm)  Body mass index is 37.42 kg/m².      Intake/Output Summary (Last 24 hours) at 8/22/2024 0994  Last data filed at 8/21/2024 1228  Gross per 24 hour   Intake --   Output 1800 ml   Net -1800 ml        Ortho/SPM Exam     NAD, resting comfortably in bed  A&O x3   Breathing comfortably w/o distress   Extremities WWP     MSK:         LLE:  Inspection: Dressings clean, dry, and intact    Palpation: Appropriate post-op TTP of the lateral hip   Compartments soft and compressible.   ROM: Painless ROM ankle    Neuro: TA/Gastroc/EHL/FHL assessed in isolation without deficit.   SILT Sa/Shukla/DP/SP/T nerve distributions   Vascular: DP artery palpated 2+        Significant Labs: " "CBC:   Recent Labs   Lab 08/20/24  1621 08/21/24  0325   WBC 9.69 7.36   HGB 9.4* 8.4*   HCT 32.2* 29.3*    239     CMP:   Recent Labs   Lab 08/21/24  0325   *   K 4.8      CO2 24   *   BUN 22   CREATININE 0.9   CALCIUM 9.0   PROT 6.5   ALBUMIN 2.6*   BILITOT 0.3   ALKPHOS 65   AST 20   ALT 13   ANIONGAP 10     Coagulation:   No results for input(s): "LABPROT", "INR", "APTT" in the last 48 hours.    All pertinent labs within the past 24 hours have been reviewed.    Significant Imaging: I have reviewed and interpreted all pertinent imaging results/findings.  Assessment/Plan:     * Lytic bone lesion of left femur s/p IM nail on 8/20/2024  Hannah Montoya is a 68 y.o. female with PMH of non-small cell lung cancer (treated w/ chemo & radiation), chronic liver disease, diabetes, hypertension, hyperlipidemia, COPD, radiation pneumonitis, breast cancer, dilated cardiomyopathy, and pacemaker placement presenting as a transfer from [facility] for orthopaedic oncology evaluation after presenting for 3 week history of pain in the left hip radiating to left knee that seems to be worsening. Imaging of the left hip (XR & CT) with oblique osteolytic lesion w/ cortical destruction of the left intertrochanteric region of femur.     S/p L IMN 8/20/24    -Admitted to medicine  -Pain: multimodal regimen  -DVT Ppx: Eliquis 2.5 BID   -WBAT left lower extremity     Dispo: stable for dc from orthopedic perspective           JOJO Mai MD  Orthopedics  [facility] - Surgery    "

## 2024-08-23 VITALS
BODY MASS INDEX: 37.64 KG/M2 | TEMPERATURE: 98 F | HEIGHT: 62 IN | DIASTOLIC BLOOD PRESSURE: 58 MMHG | SYSTOLIC BLOOD PRESSURE: 125 MMHG | RESPIRATION RATE: 18 BRPM | OXYGEN SATURATION: 94 % | HEART RATE: 99 BPM | WEIGHT: 204.56 LBS

## 2024-08-23 LAB
ALBUMIN SERPL BCP-MCNC: 2.3 G/DL (ref 3.5–5.2)
ALP SERPL-CCNC: 81 U/L (ref 55–135)
ALT SERPL W/O P-5'-P-CCNC: 10 U/L (ref 10–44)
ANION GAP SERPL CALC-SCNC: 7 MMOL/L (ref 8–16)
AST SERPL-CCNC: 13 U/L (ref 10–40)
BASOPHILS # BLD AUTO: 0.03 K/UL (ref 0–0.2)
BASOPHILS NFR BLD: 0.5 % (ref 0–1.9)
BILIRUB SERPL-MCNC: 0.2 MG/DL (ref 0.1–1)
BLD PROD TYP BPU: NORMAL
BLD PROD TYP BPU: NORMAL
BLOOD UNIT EXPIRATION DATE: NORMAL
BLOOD UNIT EXPIRATION DATE: NORMAL
BLOOD UNIT TYPE CODE: 6200
BLOOD UNIT TYPE CODE: 6200
BLOOD UNIT TYPE: NORMAL
BLOOD UNIT TYPE: NORMAL
BUN SERPL-MCNC: 15 MG/DL (ref 8–23)
CALCIUM SERPL-MCNC: 9.6 MG/DL (ref 8.7–10.5)
CHLORIDE SERPL-SCNC: 104 MMOL/L (ref 95–110)
CO2 SERPL-SCNC: 29 MMOL/L (ref 23–29)
CODING SYSTEM: NORMAL
CODING SYSTEM: NORMAL
CREAT SERPL-MCNC: 0.8 MG/DL (ref 0.5–1.4)
CROSSMATCH INTERPRETATION: NORMAL
CROSSMATCH INTERPRETATION: NORMAL
DIFFERENTIAL METHOD BLD: ABNORMAL
DISPENSE STATUS: NORMAL
DISPENSE STATUS: NORMAL
EOSINOPHIL # BLD AUTO: 0.1 K/UL (ref 0–0.5)
EOSINOPHIL NFR BLD: 1.5 % (ref 0–8)
ERYTHROCYTE [DISTWIDTH] IN BLOOD BY AUTOMATED COUNT: 17.9 % (ref 11.5–14.5)
EST. GFR  (NO RACE VARIABLE): >60 ML/MIN/1.73 M^2
GLUCOSE SERPL-MCNC: 183 MG/DL (ref 70–110)
HCT VFR BLD AUTO: 27.3 % (ref 37–48.5)
HGB BLD-MCNC: 8.1 G/DL (ref 12–16)
IMM GRANULOCYTES # BLD AUTO: 0.09 K/UL (ref 0–0.04)
IMM GRANULOCYTES NFR BLD AUTO: 1.4 % (ref 0–0.5)
LYMPHOCYTES # BLD AUTO: 0.4 K/UL (ref 1–4.8)
LYMPHOCYTES NFR BLD: 6.8 % (ref 18–48)
MAGNESIUM SERPL-MCNC: 1.5 MG/DL (ref 1.6–2.6)
MCH RBC QN AUTO: 23.5 PG (ref 27–31)
MCHC RBC AUTO-ENTMCNC: 29.7 G/DL (ref 32–36)
MCV RBC AUTO: 79 FL (ref 82–98)
MONOCYTES # BLD AUTO: 0.6 K/UL (ref 0.3–1)
MONOCYTES NFR BLD: 9.2 % (ref 4–15)
NEUTROPHILS # BLD AUTO: 5.3 K/UL (ref 1.8–7.7)
NEUTROPHILS NFR BLD: 80.6 % (ref 38–73)
NRBC BLD-RTO: 0 /100 WBC
NUM UNITS TRANS PACKED RBC: NORMAL
NUM UNITS TRANS PACKED RBC: NORMAL
PHOSPHATE SERPL-MCNC: 2 MG/DL (ref 2.7–4.5)
PLATELET # BLD AUTO: 207 K/UL (ref 150–450)
PMV BLD AUTO: 11.3 FL (ref 9.2–12.9)
POCT GLUCOSE: 186 MG/DL (ref 70–110)
POCT GLUCOSE: 194 MG/DL (ref 70–110)
POTASSIUM SERPL-SCNC: 4.9 MMOL/L (ref 3.5–5.1)
PROT SERPL-MCNC: 6.5 G/DL (ref 6–8.4)
RBC # BLD AUTO: 3.45 M/UL (ref 4–5.4)
SODIUM SERPL-SCNC: 140 MMOL/L (ref 136–145)
WBC # BLD AUTO: 6.51 K/UL (ref 3.9–12.7)

## 2024-08-23 PROCEDURE — 97116 GAIT TRAINING THERAPY: CPT | Mod: HCNC

## 2024-08-23 PROCEDURE — 97535 SELF CARE MNGMENT TRAINING: CPT | Mod: HCNC,CO

## 2024-08-23 PROCEDURE — 36415 COLL VENOUS BLD VENIPUNCTURE: CPT | Mod: HCNC | Performed by: HOSPITALIST

## 2024-08-23 PROCEDURE — 25000003 PHARM REV CODE 250: Mod: HCNC

## 2024-08-23 PROCEDURE — 85025 COMPLETE CBC W/AUTO DIFF WBC: CPT | Mod: HCNC | Performed by: HOSPITALIST

## 2024-08-23 PROCEDURE — 94761 N-INVAS EAR/PLS OXIMETRY MLT: CPT | Mod: HCNC

## 2024-08-23 PROCEDURE — 97530 THERAPEUTIC ACTIVITIES: CPT | Mod: HCNC,CO

## 2024-08-23 PROCEDURE — 83735 ASSAY OF MAGNESIUM: CPT | Mod: HCNC | Performed by: HOSPITALIST

## 2024-08-23 PROCEDURE — 94640 AIRWAY INHALATION TREATMENT: CPT | Mod: HCNC

## 2024-08-23 PROCEDURE — 99900035 HC TECH TIME PER 15 MIN (STAT): Mod: HCNC

## 2024-08-23 PROCEDURE — 25000003 PHARM REV CODE 250: Mod: HCNC | Performed by: HOSPITALIST

## 2024-08-23 PROCEDURE — 84100 ASSAY OF PHOSPHORUS: CPT | Mod: HCNC | Performed by: HOSPITALIST

## 2024-08-23 PROCEDURE — 80053 COMPREHEN METABOLIC PANEL: CPT | Mod: HCNC | Performed by: HOSPITALIST

## 2024-08-23 RX ORDER — CELECOXIB 200 MG/1
200 CAPSULE ORAL DAILY
Qty: 30 CAPSULE | Refills: 0 | Status: SHIPPED | OUTPATIENT
Start: 2024-08-24 | End: 2024-09-23

## 2024-08-23 RX ORDER — ACETAMINOPHEN 500 MG
1000 TABLET ORAL EVERY 8 HOURS PRN
COMMUNITY
Start: 2024-08-23

## 2024-08-23 RX ORDER — CHOLECALCIFEROL (VITAMIN D3) 50 MCG
2000 TABLET ORAL DAILY
Qty: 30 TABLET | Refills: 11 | Status: SHIPPED | OUTPATIENT
Start: 2024-08-24 | End: 2025-08-24

## 2024-08-23 RX ORDER — LANOLIN ALCOHOL/MO/W.PET/CERES
400 CREAM (GRAM) TOPICAL 2 TIMES DAILY
Qty: 14 TABLET | Refills: 0 | Status: SHIPPED | OUTPATIENT
Start: 2024-08-23 | End: 2024-08-30

## 2024-08-23 RX ORDER — METHOCARBAMOL 750 MG/1
750 TABLET, FILM COATED ORAL 4 TIMES DAILY
Qty: 120 TABLET | Refills: 0 | Status: SHIPPED | OUTPATIENT
Start: 2024-08-23 | End: 2024-09-22

## 2024-08-23 RX ORDER — OXYCODONE HYDROCHLORIDE 5 MG/1
5 TABLET ORAL EVERY 4 HOURS PRN
Qty: 30 TABLET | Refills: 0 | Status: SHIPPED | OUTPATIENT
Start: 2024-08-23

## 2024-08-23 RX ORDER — AMOXICILLIN 250 MG
1 CAPSULE ORAL DAILY
Qty: 14 TABLET | Refills: 0 | Status: SHIPPED | OUTPATIENT
Start: 2024-08-23 | End: 2024-09-06

## 2024-08-23 RX ADMIN — AMLODIPINE BESYLATE 5 MG: 5 TABLET ORAL at 09:08

## 2024-08-23 RX ADMIN — TIOTROPIUM BROMIDE INHALATION SPRAY 2 PUFF: 3.12 SPRAY, METERED RESPIRATORY (INHALATION) at 09:08

## 2024-08-23 RX ADMIN — BUPROPION HYDROCHLORIDE 150 MG: 150 TABLET, FILM COATED, EXTENDED RELEASE ORAL at 09:08

## 2024-08-23 RX ADMIN — APIXABAN 2.5 MG: 2.5 TABLET, FILM COATED ORAL at 09:08

## 2024-08-23 RX ADMIN — INSULIN ASPART 2 UNITS: 100 INJECTION, SOLUTION INTRAVENOUS; SUBCUTANEOUS at 08:08

## 2024-08-23 RX ADMIN — POLYETHYLENE GLYCOL 3350 17 G: 17 POWDER, FOR SOLUTION ORAL at 09:08

## 2024-08-23 RX ADMIN — Medication 400 MG: at 09:08

## 2024-08-23 RX ADMIN — FLUTICASONE FUROATE AND VILANTEROL TRIFENATATE 1 PUFF: 200; 25 POWDER RESPIRATORY (INHALATION) at 09:08

## 2024-08-23 RX ADMIN — ACETAMINOPHEN 1000 MG: 500 TABLET ORAL at 05:08

## 2024-08-23 RX ADMIN — GABAPENTIN 300 MG: 300 CAPSULE ORAL at 09:08

## 2024-08-23 RX ADMIN — SENNOSIDES AND DOCUSATE SODIUM 1 TABLET: 50; 8.6 TABLET ORAL at 09:08

## 2024-08-23 RX ADMIN — THERA TABS 1 TABLET: TAB at 09:08

## 2024-08-23 RX ADMIN — LOSARTAN POTASSIUM 100 MG: 25 TABLET, FILM COATED ORAL at 09:08

## 2024-08-23 RX ADMIN — METOPROLOL SUCCINATE 100 MG: 100 TABLET, EXTENDED RELEASE ORAL at 09:08

## 2024-08-23 RX ADMIN — CHOLECALCIFEROL TAB 25 MCG (1000 UNIT) 2000 UNITS: 25 TAB at 09:08

## 2024-08-23 RX ADMIN — ATORVASTATIN CALCIUM 10 MG: 10 TABLET, FILM COATED ORAL at 09:08

## 2024-08-23 RX ADMIN — METHOCARBAMOL 750 MG: 750 TABLET ORAL at 05:08

## 2024-08-23 RX ADMIN — CELECOXIB 200 MG: 200 CAPSULE ORAL at 09:08

## 2024-08-23 RX ADMIN — PANTOPRAZOLE SODIUM 40 MG: 40 TABLET, DELAYED RELEASE ORAL at 09:08

## 2024-08-23 NOTE — PT/OT/SLP PROGRESS
Physical Therapy   Progress Note    Patient Name:  Hannah Montoya  MRN: [id number]    Admit Date: 8/18/2024  Admitting Diagnosis:  Lytic bone lesion of left femur  Length of Stay: 3 days  Recent Surgery: Procedure(s) (LRB):  INSERTION, INTRAMEDULLARY ANTOINE, FEMUR (Left)  BIOPSY, FEMUR (Left) 3 Days Post-Op    Recommendations:     Discharge Recommendations: low intensity therapy  Equipment recommendations: rolling walker and bedside commode  Barriers to discharge: None Identified     Ambulate 3x/day with nsg per progressive mobility protocol - contact guard assist with RW   Assessment:     Hannah Montoya is a 68 y.o. female admitted to [facility] on 8/18/2024 with medical diagnosis of Lytic bone lesion of left femur. Pt presents with weakness, impaired endurance, impaired self care skills, impaired functional mobility, impaired balance, gait instability, decreased coordination, decreased lower extremity function, pain, orthopedic precautions. Pt is progressing towards goals, but has not yet reached prior level of function.     Pt agreeable to therapy session. Up in recliner when therapist entered room. Pt able to stand from recliner with RW without assist. Able to ambulate increased distance in hallway. Demonstrates antalgic gait with decreased speed and multiple standing rest breaks. Pt left in recliner at end of session.    Hannah Montoya would benefit from continued acute PT intervention to improve quality of life, focus on recovery of impairments, provide patient/caregiver education, reduce fall risk, and maximize (I) and safety with functional mobility. Once medically stable, recommending pt discharge to low intensity therapy.  Patient continues to demonstrate the need for low intensity therapy on a scheduled basis exhibited by decreased independence with functional mobility .      Rehab Prognosis: Good    Plan:     During this hospitalization, patient to be seen 4 x/week to address the identified  rehab impairments via gait training, therapeutic activities, therapeutic exercises, neuromuscular re-education and progress towards stated goals.     Plan of Care Expires:  09/20/24  Plan of Care reviewed with: patient    This plan of care has been discussed with the patient/caregiver, who was included in its development and is in agreement with the identified goals and treatment plan.     Subjective     Communicated with RN prior to session.  Patient found up in chair upon PT entry to room, agreeable to therapy session. Pt alone during session.    Patient/Family Comments/goals: motivated to work with therapy    Pain/Comfort:  Pain Rating 1: 0/10  Location - Side 1: Left  Location - Orientation 1: generalized  Location 1: hip  Pain Addressed 1: Reposition, Distraction, Cessation of Activity  Pain Rating Post-Intervention 1:  (endorses pain but does not rate)    Patients cultural, spiritual, [employer] conflicts given the current situation: None identified     Objective:     Patient found with: telemetry, SCD, FCD    General Precautions: Standard, fall   Orthopedic Precautions:LLE weight bearing as tolerated   Braces:     Oxygen Device: room air    Cognition:  Pt is Alert during session.    Therapist provided skilled verbal and tactile cueing to facilitate the following functional mobility tasks. Listed tasks are focused on recovery of impairments and improving pt's independence and efficiency with bed mobility, transfers and ambulation as able.     Bed Mobility:  Not assessed d/t up in recliner    Transfers:   Sit <> Stand Transfer: Contact Guard Assistance from recliner with RW AD                  Gait:  Distance: 42 ft  Assistance level: Contact Guard Assistance  Assistive Device: rolling walker  Gait Assessment: decreased step length , decreased devon, decreased gait speed, and antalgic gait pattern  Multiple standing rest breaks d/t c/o bilateral arm fatigue    Balance:  Dynamic Sitting: GOOD: Maintains  balance through MODERATE excursions of active trunk movement  Standing:  Static: FAIR+: Takes MINIMAL challenges from all directions   Dynamic: FAIR: Needs CONTACT GUARD during gait    Outcome Measure: AM-PAC 6 CLICK MOBILITY  Total Score:17     Patient/Caregiver Education and Additional Therapeutic Activities/Exercises     Provided pt/caregiver education regarding:   PT POC and goals for therapy   Safety with mobility and fall risk   Safe management of AD as needed   Energy conservation techniques   Instruction on use of call button and importance of calling nursing staff for assistance with mobility     Patient/caregiver able to verbalize understanding; will follow-up with pt/caregiver during current admit for additional questions/concerns within scope of practice.     White board updated.     Patient left up in chair with all lines intact and call button in reach.    Goals:     Multidisciplinary Problems       Physical Therapy Goals          Problem: Physical Therapy    Goal Priority Disciplines Outcome Goal Variances Interventions   Physical Therapy Goal     PT, PT/OT Progressing     Description: PT goals until 9/2/24    1. Pt supine to sit with CGA-not met  2. Pt sit to supine with CGA-not met  3. Pt sit to stand with RW with WBAT L LE with CGA- met 8/23      3b. Sit to stand with RW with WBAT LLE with supervision.  4. Pt to perform gait 100ft with RW with WBAT L LE with CGA.-not met  5. Pt to go up/down curb step with RW with WBAT L LE with minimal assist.-not met  6. Pt to perform B LE exs in sitting or supine x 10 reps to strengthen B LE to improve functional mobility.-not met    DME Justifications (see above for complete DME recommendations)    Bedside Commode- Patient has a mobility limitation that significantly impairs their ability to participate in one or more mobility related activities of daily living, including toileting. This deficit can be resolved by using a bedside commode. Patient demonstrates  mobility limitations that will cause them to be confined to one room at home without bathroom access for up to 30 days. Using a bedside commode will greatly improve the patient's ability to participate in MRADLs.     Rolling Walker- Patient demonstrates a mobility limitation that significantly impairs their ability to participate in one or more mobility related activities of daily living. Patient's mobility limitation cannot be sufficiently resolved with the use of a cane, but can be sufficiently resolved with the use of a rolling walker.The use of a rolling walker will considerably improve their ability to participate in MRADLs. Patient will use the walker on a regular basis at home.                             Time Tracking:       PT Received On: 08/23/24  PT Start Time: 1019     PT Stop Time: 1038  PT Total Time (min): 19 min     Billable Minutes: Gait Training 19 08/23/2024

## 2024-08-23 NOTE — PROGRESS NOTES
[facility] - [facility]  Orthopedics  Progress Note    Patient Name: Hannah Montoya  MRN: [id number]  Admission Date: 8/18/2024  Hospital Length of Stay: 3 days  Attending Provider: Fanny Meyers MD  Primary Care Provider: Emanuel Chavez MD  Follow-up For: Procedure(s) (LRB):  INSERTION, INTRAMEDULLARY ANTOINE, FEMUR (Left)  BIOPSY, FEMUR (Left)    Post-Operative Day: 3 Days Post-Op  Subjective:     Principal Problem:Lytic bone lesion of left femur    Principal Orthopedic Problem: Osteolytic lesion of left proximal femur s/p L IMN 8/20/24    Interval History: NAEON. VSS. AF. Pain controlled. Tolerating PO. Ambulated 36 ft with PT yesterday, PT recommending low intensity therapy. Plan for home with  this morning.     Review of patient's allergies indicates:   Allergen Reactions    Taxol [paclitaxel]      Hypersensitivity reaction to taxol, symptoms included shortness of breath, nausea, dizziness, flushing     Carboplatin Other (See Comments)     Itching and hives    Adhesive Rash     tegaderm burns and blistered skin       Current Facility-Administered Medications   Medication    acetaminophen tablet 1,000 mg    albuterol-ipratropium 2.5 mg-0.5 mg/3 mL nebulizer solution 3 mL    aluminum-magnesium hydroxide-simethicone 200-200-20 mg/5 mL suspension 30 mL    amLODIPine tablet 5 mg    apixaban tablet 2.5 mg    atorvastatin tablet 10 mg    benzonatate capsule 200 mg    bisacodyL suppository 10 mg    buPROPion TB24 tablet 150 mg    celecoxib capsule 200 mg    cetirizine tablet 10 mg    dextrose 10% bolus 125 mL 125 mL    dextrose 10% bolus 250 mL 250 mL    fluticasone furoate-vilanteroL 200-25 mcg/dose diskus inhaler 1 puff    gabapentin capsule 300 mg    glucagon (human recombinant) injection 1 mg    glucose chewable tablet 16 g    glucose chewable tablet 24 g    insulin aspart U-100 pen 0-10 Units    losartan tablet 100 mg    magnesium oxide tablet 400 mg    melatonin tablet 6 mg    methocarbamoL tablet 750 mg  "   metoprolol succinate (TOPROL-XL) 24 hr tablet 100 mg    morphine injection 2 mg    multivitamin tablet    naloxone 0.4 mg/mL injection 0.02 mg    ondansetron injection 4 mg    oxyCODONE immediate release tablet 5 mg    oxyCODONE immediate release tablet Tab 10 mg    pantoprazole EC tablet 40 mg    polyethylene glycol packet 17 g    senna-docusate 8.6-50 mg per tablet 1 tablet    senna-docusate 8.6-50 mg per tablet 1 tablet    sertraline tablet 100 mg    sodium chloride 0.9% flush 10 mL    tiotropium bromide 2.5 mcg/actuation inhaler 2 puff    vitamin D 1000 units tablet 2,000 Units     Facility-Administered Medications Ordered in Other Encounters   Medication    fentaNYL injection 25 mcg    haloperidol lactate injection 0.5 mg    HYDROmorphone injection 0.2 mg    ondansetron injection 4 mg    sodium chloride 0.9% flush 10 mL     Objective:     Vital Signs (Most Recent):  Temp: 98.7 °F (37.1 °C) (08/23/24 0500)  Pulse: 78 (08/23/24 0500)  Resp: 18 (08/23/24 0500)  BP: (!) 142/56 (08/23/24 0500)  SpO2: (!) 94 % (08/23/24 0500) Vital Signs (24h Range):  Temp:  [96.3 °F (35.7 °C)-98.7 °F (37.1 °C)] 98.7 °F (37.1 °C)  Pulse:  [78-96] 78  Resp:  [16-20] 18  SpO2:  [93 %-99 %] 94 %  BP: (122-153)/(56-69) 142/56     Weight: 92.8 kg (204 lb 9.4 oz)  Height: 5' 2" (157.5 cm)  Body mass index is 37.42 kg/m².      Intake/Output Summary (Last 24 hours) at 8/23/2024 0556  Last data filed at 8/22/2024 1014  Gross per 24 hour   Intake 237 ml   Output --   Net 237 ml        Ortho/SPM Exam     NAD, resting comfortably in bed  A&O x3   Breathing comfortably w/o distress   Extremities WWP     MSK:         LLE:  Inspection: Dressings clean, dry, and intact    Palpation: Appropriate post-op TTP of the lateral hip   Compartments soft and compressible.   ROM: Painless ROM ankle, knee flexion to 80 degrees    Neuro: TA/Gastroc/EHL/FHL assessed in isolation without deficit.   SILT Sa/Shukla/DP/SP/T nerve distributions   Vascular: DP artery " "palpated 2+        Significant Labs: CBC:   Recent Labs   Lab 08/22/24  1412   WBC 6.98   HGB 7.8*   HCT 26.4*        CMP:   Recent Labs   Lab 08/22/24  1412      K 4.9      CO2 28   *   BUN 19   CREATININE 1.0   CALCIUM 9.1   PROT 6.4   ALBUMIN 2.4*   BILITOT 0.2   ALKPHOS 74   AST 15   ALT 11   ANIONGAP 7*     Coagulation:   No results for input(s): "LABPROT", "INR", "APTT" in the last 48 hours.    All pertinent labs within the past 24 hours have been reviewed.    Significant Imaging: I have reviewed and interpreted all pertinent imaging results/findings.  Assessment/Plan:     * Lytic bone lesion of left femur s/p IM nail on 8/20/2024  Hannah Montoya is a 68 y.o. female with PMH of non-small cell lung cancer (treated w/ chemo & radiation), chronic liver disease, diabetes, hypertension, hyperlipidemia, COPD, radiation pneumonitis, breast cancer, dilated cardiomyopathy, and pacemaker placement presenting as a transfer from [facility] for orthopaedic oncology evaluation after presenting for 3 week history of pain in the left hip radiating to left knee that seems to be worsening. Imaging of the left hip (XR & CT) with oblique osteolytic lesion w/ cortical destruction of the left intertrochanteric region of femur.     S/p L IMN 8/20/24    -Admitted to   -Pain: multimodal regimen  -DVT Ppx: Eliquis 2.5 BID   -WBAT left lower extremity     Dispo: stable for dc from orthopedic perspective           JOJO Mai MD  Orthopedics  [facility] - Surgery    "

## 2024-08-23 NOTE — ASSESSMENT & PLAN NOTE
Cardiac resynchronization therapy defibrillator (CRT-D) in place   Patient with known NICM and well controlled. Patient with CRT-D defibrillator in place. Patient euvolemic. Continue home Losartan and Toprol XL to treat.

## 2024-08-23 NOTE — SUBJECTIVE & OBJECTIVE
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 VSS. AF. Pain controlled. Tolerating PO. Ambulated 36 ft with PT yesterday, PT recommending low intensity therapy. Plan for home with  this morning.     Review of patient's allergies indicates:   Allergen Reactions    Taxol [paclitaxel]      Hypersensitivity reaction to taxol, symptoms included shortness of breath, nausea, dizziness, flushing     Carboplatin Other (See Comments)     Itching and hives    Adhesive Rash     tegaderm burns and blistered skin       Current Facility-Administered Medications   Medication    acetaminophen tablet 1,000 mg    albuterol-ipratropium 2.5 mg-0.5 mg/3 mL nebulizer solution 3 mL    aluminum-magnesium hydroxide-simethicone 200-200-20 mg/5 mL suspension 30 mL    amLODIPine tablet 5 mg    apixaban tablet 2.5 mg    atorvastatin tablet 10 mg    benzonatate capsule 200 mg    bisacodyL suppository 10 mg    buPROPion TB24 tablet 150 mg    celecoxib capsule 200 mg    cetirizine tablet 10 mg    dextrose 10% bolus 125 mL 125 mL    dextrose 10% bolus 250 mL 250 mL    fluticasone furoate-vilanteroL 200-25 mcg/dose diskus inhaler 1 puff    gabapentin capsule 300 mg    glucagon (human recombinant) injection 1 mg    glucose chewable tablet 16 g    glucose chewable tablet 24 g    insulin aspart U-100 pen 0-10 Units    losartan tablet 100 mg    magnesium oxide tablet 400 mg    melatonin tablet 6 mg    methocarbamoL tablet 750 mg    metoprolol succinate (TOPROL-XL) 24 hr tablet 100 mg    morphine injection 2 mg    multivitamin tablet    naloxone 0.4 mg/mL injection 0.02 mg    ondansetron injection 4 mg    oxyCODONE immediate release tablet 5 mg    oxyCODONE immediate release tablet Tab 10 mg    pantoprazole EC tablet 40 mg    polyethylene glycol packet 17 g    senna-docusate 8.6-50 mg per tablet 1 tablet    senna-docusate 8.6-50 mg  "per tablet 1 tablet    sertraline tablet 100 mg    sodium chloride 0.9% flush 10 mL    tiotropium bromide 2.5 mcg/actuation inhaler 2 puff    vitamin D 1000 units tablet 2,000 Units     Facility-Administered Medications Ordered in Other Encounters   Medication    fentaNYL injection 25 mcg    haloperidol lactate injection 0.5 mg    HYDROmorphone injection 0.2 mg    ondansetron injection 4 mg    sodium chloride 0.9% flush 10 mL     Objective:     Vital Signs (Most Recent):  Temp: 98.7 °F (37.1 °C) (08/23/24 0500)  Pulse: 78 (08/23/24 0500)  Resp: 18 (08/23/24 0500)  BP: (!) 142/56 (08/23/24 0500)  SpO2: (!) 94 % (08/23/24 0500) Vital Signs (24h Range):  Temp:  [96.3 °F (35.7 °C)-98.7 °F (37.1 °C)] 98.7 °F (37.1 °C)  Pulse:  [78-96] 78  Resp:  [16-20] 18  SpO2:  [93 %-99 %] 94 %  BP: (122-153)/(56-69) 142/56     Weight: 92.8 kg (204 lb 9.4 oz)  Height: 5' 2" (157.5 cm)  Body mass index is 37.42 kg/m².      Intake/Output Summary (Last 24 hours) at 8/23/2024 0556  Last data filed at 8/22/2024 1014  Gross per 24 hour   Intake 237 ml   Output --   Net 237 ml        Ortho/SPM Exam     NAD, resting comfortably in bed  A&O x3   Breathing comfortably w/o distress   Extremities WWP     MSK:         LLE:  Inspection: Dressings clean, dry, and intact    Palpation: Appropriate post-op TTP of the lateral hip   Compartments soft and compressible.   ROM: Painless ROM ankle, knee flexion to 80 degrees    Neuro: TA/Gastroc/EHL/FHL assessed in isolation without deficit.   SILT Sa/Shukla/DP/SP/T nerve distributions   Vascular: DP artery palpated 2+        Significant Labs: CBC:   Recent Labs   Lab 08/22/24  1412   WBC 6.98   HGB 7.8*   HCT 26.4*        CMP:   Recent Labs   Lab 08/22/24  1412      K 4.9      CO2 28   *   BUN 19   CREATININE 1.0   CALCIUM 9.1   PROT 6.4   ALBUMIN 2.4*   BILITOT 0.2   ALKPHOS 74   AST 15   ALT 11   ANIONGAP 7*     Coagulation:   No results for input(s): "LABPROT", "INR", "APTT" in the " last 48 hours.    All pertinent labs within the past 24 hours have been reviewed.    Significant Imaging: I have reviewed and interpreted all pertinent imaging results/findings.

## 2024-08-23 NOTE — ASSESSMENT & PLAN NOTE
Hannah Montoya is a 68 y.o. female with PMH of non-small cell lung cancer (treated w/ chemo & radiation), chronic liver disease, diabetes, hypertension, hyperlipidemia, COPD, radiation pneumonitis, breast cancer, dilated cardiomyopathy, and pacemaker placement presenting as a transfer from [facility] for orthopaedic oncology evaluation after presenting for 3 week history of pain in the left hip radiating to left knee that seems to be worsening. Imaging of the left hip (XR & CT) with oblique osteolytic lesion w/ cortical destruction of the left intertrochanteric region of femur.     S/p L IMN 8/20/24    -Admitted to   -Pain: multimodal regimen  -DVT Ppx: Eliquis 2.5 BID   -WBAT left lower extremity     Dispo: stable for dc from orthopedic perspective

## 2024-08-23 NOTE — PT/OT/SLP PROGRESS
Occupational Therapy   Treatment    Name: Hannah Montoya  MRN: [id number]  Admitting Diagnosis:  Lytic bone lesion of left femur  3 Days Post-Op    Recommendations:     Discharge Recommendations: Low Intensity Therapy  Discharge Equipment Recommendations:  none  Barriers to discharge:       Assessment:     Hannah Montoya is a 68 y.o. female with a medical diagnosis of Lytic bone lesion of left femur.  She presents with the following performance deficits affecting function: weakness, impaired endurance, impaired self care skills, impaired functional mobility, gait instability, impaired balance, pain, decreased safety awareness, decreased lower extremity function, decreased coordination.     Rehab Prognosis:  Good; patient would benefit from acute skilled OT services to address these deficits and reach maximum level of function.       Plan:     Patient to be seen 4 x/week to address the above listed problems via self-care/home management, therapeutic activities, therapeutic exercises  Plan of Care Expires: 09/20/24  Plan of Care Reviewed with: patient    Subjective     Patient/Family Comments/goals: to improve function   Pain/Comfort:  Pain Rating 1: 3/10  Location - Side 1: Left  Location - Orientation 1: generalized  Location 1: hip  Pain Addressed 1: Reposition, Distraction, Cessation of Activity  Pain Rating Post-Intervention 1: 0/10    Objective:     Communicated with: RN prior to session.  Patient found HOB elevated with telemetry, SCD, FCD upon OT entry to room.  A client care conference was completed by the OTR and the GILES prior to treatment by the GILES to discuss the patient's POC and current status.     General Precautions: Standard, fall    Orthopedic Precautions:LLE weight bearing as tolerated  Braces: N/A  Respiratory Status: Room air     Occupational Performance:     Bed Mobility:    Patient completed Supine to Sit with contact guard assistance     Functional Mobility/Transfers:  Patient  completed Sit <> Stand Transfer with stand by assistance  with  rolling walker   Patient completed Bed <> Chair Transfer using Step Transfer technique with stand by assistance with rolling walker  Functional Mobility: pt ambulated ~20ft x 2 trials with CGA using RW. No LOB or SOB noted.     Activities of Daily Living:  Grooming: stand by assistance standing at sink for oral hygiene and facial care  Upper Body Dressing: supervision to don gown around back      [facility] 6 Click ADL: 20    Treatment & Education:  Pt educated on OT POC and frequency during hospital stay.   Pt educated on proper hand placement and techniques for RW mgmt to improve safety awareness.   Pt educated on importance of OOB activity to improve function and activity tolerance.  Addressed all patient questions/concerns within GILES scope of practice.     Patient left HOB elevated with all lines intact, call button in reach, and RN notified    GOALS:   Multidisciplinary Problems       Occupational Therapy Goals          Problem: Occupational Therapy    Goal Priority Disciplines Outcome Interventions   Occupational Therapy Goal     OT, PT/OT Progressing    Description: Goals to be met by: 9/20/24     Patient will increase functional independence with ADLs by performing:    UE Dressing with Supervision.  LE Dressing with Stand-by Assistance.  Toileting from toilet with Supervision for hygiene and clothing management.   All functional transfers performed with SBA                         Time Tracking:     OT Date of Treatment: 08/23/24  OT Start Time: 0851  OT Stop Time: 0917  OT Total Time (min): 26 min    Billable Minutes:Self Care/Home Management 12  Therapeutic Activity 14    OT/MARA: MARA     Number of MARA visits since last OT visit: 2    8/23/2024

## 2024-08-23 NOTE — PLAN OF CARE
[facility]    HOME HEALTH ORDERS  FACE TO FACE ENCOUNTER    Patient Name: Hannah Montoya  YOB: 1955    PCP: Emanuel Chavez MD   PCP Address: [address] / [place] [address]  PCP Phone Number: [phone]  PCP Fax: [phone]    Encounter Date: 08/23/2024    Discharging Team: [facility] MED H    Admit to Home Health    Diagnoses:  Active Hospital Problems    Diagnosis  POA    *Lytic bone lesion of left femur s/p IM nail on 8/20/2024 [M89.9]  Yes     Priority: 1 - High    Acute blood loss anemia [D62]  No     Priority: 2     Hypomagnesemia [E83.42]  Yes     Priority: 3     Malignant neoplasm metastatic to bone [C79.51]  Yes     Priority: 4     NSCLC of right lung [C34.91]  Yes     Priority: 4     Essential hypertension [I10]  Yes     Priority: 5     Type 2 diabetes mellitus without complication [E11.9]  Yes     Priority: 6     Class 2 severe obesity due to excess calories with serious comorbidity in adult [E66.01]  Yes     Priority: 7     Moderate episode of recurrent major depressive disorder [F33.1]  Yes     Priority: 8     Left bundle-branch block [I44.7]  Yes     Priority: 9     NICM (nonischemic cardiomyopathy) [I42.8]  Yes     Priority: 10     Pathological fracture of intertrochanteric section of femur [M84.453A]  Yes    Cardiac resynchronization therapy defibrillator (CRT-D) in place [Z95.810]  Yes      Resolved Hospital Problems    Diagnosis Date Resolved POA    History of breast cancer in female [Z85.3] 08/21/2024 Not Applicable       Future Appointments   Date Time Provider Department Center   9/3/2024  3:15 PM Corey Ceron NP [facility]O [address] [facility]   9/9/2024  8:20 AM Emanuel Chavez MD [facility] MED [place]   9/11/2024  9:30 AM LAB, [facility] LAB [facility]   9/12/2024  9:00 AM Aide Magaña NP [facility] HEMONC3 [facility]   9/12/2024 10:00 AM CHAIR 4, [facility] BMT INF [facility] B[facility] INF [facility]   10/2/2024 10:30 AM Corey Ceron, SOCRATES [facility]  ORTHO [facility]   10/2/2024 11:00 AM [facility] M[facility]1 [facility] M[facility] [place]   10/3/2024 10:05 AM LAB, [facility] LAB [facility]   10/10/2024 11:00 AM 3PREP, ERVIN FRANZ [facility]   10/24/2024 10:30 AM Larry, Julianne, JOSE [facility] OPTOMTY [place]         I have seen and examined this patient face to face today. My clinical findings that support the need for the home health skilled services and home bound status are the following:  Weakness/numbness causing balance and gait disturbance due to surgery and Weakness/Debility making it taxing to leave home.  Requiring assistive device to leave home due to unsteady gait caused by surgery and Weakness/Debility.    Allergies:  Review of patient's allergies indicates:   Allergen Reactions    Taxol [paclitaxel]      Hypersensitivity reaction to taxol, symptoms included shortness of breath, nausea, dizziness, flushing     Carboplatin Other (See Comments)     Itching and hives    Adhesive Rash     tegaderm burns and blistered skin       Diet: diabetic diet: 2000 calorie    Activities: activity as tolerated    Weight bearing: Weight bear as tolerated to left lower extremity       When multiple disciplines ordered:     Start of Care occurs on Sunday - Wednesday schedule remaining discipline evaluations as ordered on separate consecutive days following the start of care.     Thursday SOC -schedule subsequent evaluations Friday and Monday the following week.      Friday - Saturday SOC - schedule subsequent discipline evaluations on consecutive days starting Monday of the following week.     For all post-discharge communication and subsequent orders please contact patient's primary care physician. If unable to reach primary care physician or do not receive response within 30 minutes, please contact [facility] for clinical staff order clarification.      CONSULTS:    Physical Therapy to evaluate and treat. Evaluate for home safety and equipment needs; Establish/upgrade  home exercise program. Perform / instruct on therapeutic exercises, gait training, transfer training, and Range of Motion.  Occupational Therapy to evaluate and treat. Evaluate home environment for safety and equipment needs. Perform/Instruct on transfers, ADL training, ROM, and therapeutic exercises.      WOUND CARE ORDERS  Keep surgical dressings in place that was applied post-op and leave on left hip and do not remove until Orthopedic clinic follow-up. Assess surgical dressing with each treatment. Call MD if any drainage reaches border to border of dressing horizontally, signs or symptoms of infection, temp >101 F, induration, swelling or redness.      Medications: Review discharge medications with patient and family and provide education.         Medication List        START taking these medications      acetaminophen 500 MG tablet  Commonly known as: TYLENOL  Take 2 tablets (1,000 mg total) by mouth every 8 (eight) hours as needed for Pain.     apixaban 2.5 mg Tab  Commonly known as: ELIQUIS  Take 1 tablet (2.5 mg total) by mouth 2 (two) times daily. DVT prophylaxis after hip surgery. End date 9/22/2024.     celecoxib 200 MG capsule  Commonly known as: CeleBREX  Take 1 capsule (200 mg total) by mouth once daily.  Start taking on: August 24, 2024     cholecalciferol (vitamin D3) 50 mcg (2,000 unit) Tab  Commonly known as: VITAMIN D3  Take 1 tablet (2,000 Units total) by mouth once daily.  Start taking on: August 24, 2024     magnesium oxide 400 mg (241.3 mg magnesium) tablet  Commonly known as: MAG-OX  Take 1 tablet (400 mg total) by mouth 2 (two) times daily. for 7 days     methocarbamoL 750 MG Tab  Commonly known as: ROBAXIN  Take 1 tablet (750 mg total) by mouth 4 (four) times daily.     oxyCODONE 5 MG immediate release tablet  Commonly known as: ROXICODONE  Take 1 tablet (5 mg total) by mouth every 4 (four) hours as needed for Pain.     senna-docusate 8.6-50 mg 8.6-50 mg per tablet  Commonly known as:  PERICOLACE  Take 1 tablet by mouth once daily. for 14 days            CONTINUE taking these medications      ACCU-CHEK ALFRED PLUS TEST STRP Strp  Generic drug: blood sugar diagnostic  USE TO TEST THREE TIMES DAILY     * albuterol 1.25 mg/3 mL Nebu  Commonly known as: ACCUNEB  use 1 AMPULE (3ml) via NEBULIZER EVERY 6 HOURS AS NEEDED     * albuterol 90 mcg/actuation inhaler  Commonly known as: VENTOLIN HFA  Inhale 2 puffs into the lungs every 4 (four) hours as needed for Wheezing or Shortness of Breath. Rescue     amLODIPine 5 MG tablet  Commonly known as: NORVASC  TAKE 1 TABLET BY MOUTH EVERY DAY     atorvastatin 10 MG tablet  Commonly known as: LIPITOR  TAKE 1 TABLET BY MOUTH EVERY DAY     benzonatate 200 MG capsule  Commonly known as: TESSALON  Take 1 capsule (200 mg total) by mouth 3 (three) times daily as needed for Cough.     buPROPion 150 MG TB24 tablet  Commonly known as: WELLBUTRIN XL  TAKE 1 TABLET BY MOUTH EVERY DAY     cetirizine 10 MG tablet  Commonly known as: ZYRTEC  Take 10 mg by mouth every evening.     gabapentin 300 MG capsule  Commonly known as: NEURONTIN  Take 1 capsule (300 mg total) by mouth 3 (three) times daily.     losartan 100 MG tablet  Commonly known as: COZAAR  TAKE 1 TABLET BY MOUTH EVERY DAY     meloxicam 15 MG tablet  Commonly known as: MOBIC  Take 1 tablet (15 mg total) by mouth once daily.     metFORMIN 500 MG tablet  Commonly known as: GLUCOPHAGE  TAKE 2 TABLETS BY MOUTH TWICE A DAY WITH MEALS     metoprolol succinate 100 MG 24 hr tablet  Commonly known as: TOPROL-XL  TAKE 1 TABLET BY MOUTH EVERY DAY     multivitamin per tablet  Commonly known as: THERAGRAN  Take 1 tablet by mouth once daily.     ondansetron 8 MG Tbdl  Commonly known as: ZOFRAN-ODT  Take 1 tablet (8 mg total) by mouth every 8 (eight) hours as needed (nausea/vomiting). Take 1 tablet (8 mg) by mouth every 8 hours as needed for nausea/vomiting.     palonosetron 0.25 mg/5 mL injection  Commonly known as: ALOXI      pantoprazole 40 MG tablet  Commonly known as: PROTONIX  TAKE 1 TABLET BY MOUTH EVERY DAY     * READI-CAT 2 2 % (w/v) suspension  Generic drug: barium sulfate     * READI-CAT 2 2.1 % (w/v), 2.0 % (w/w) suspension  Generic drug: barium     sertraline 100 MG tablet  Commonly known as: ZOLOFT  TAKE 1 TABLET BY MOUTH EVERY DAY IN THE EVENING     tiotropium 18 mcg inhalation capsule  Commonly known as: SPIRIVA WITH HANDIHALER  INHALE THE CONTENTS OF 1 CAPSULE BY MOUTH EVERY DAY     WIXELA INHUB 250-50 mcg/dose diskus inhaler  Generic drug: fluticasone-salmeterol 250-50 mcg/dose  INHALE 1 PUFF INTO THE LUNGS 2 (TWO) TIMES DAILY. CONTROLLER           * This list has 4 medication(s) that are the same as other medications prescribed for you. Read the directions carefully, and ask your doctor or other care provider to review them with you.                STOP taking these medications      betamethasone dipropionate 0.05 % cream     FLUAD QUAD 2023-24(65Y UP)(PF) 60 mcg (15 mcg x 4)/0.5 mL Syrg  Generic drug: flu vac 2023 [id number](PF)     mupirocin 2 % ointment  Commonly known as: BACTROBAN     OMNIPAQUE 300 300 mg iodine/mL injection  Generic drug: iohexoL     OMNIPAQUE 350 350 mg iodine/mL Soln injection  Generic drug: iohexoL     OPDIVO 120 mg/12 mL Soln  Generic drug: nivolumab     OPDIVO 240 mg/24 mL Soln  Generic drug: nivolumab     OPDIVO 40 mg/4 mL injection  Generic drug: nivolumab     semaglutide 0.25 mg or 0.5 mg(2 mg/1.5 mL) pen injector  Commonly known as: OZEMPIC     triamcinolone acetonide 0.1% 0.1 % cream  Commonly known as: KENALOG     YERVOY 50 mg/10 mL (5 mg/mL)  Generic drug: ipilimumab                I certify that this patient is confined to her home and needs intermittent physical therapy and occupational therapy.      ZACK ALEMAN MD  Attending Staff Physician   [facility]  Pager: [phone]  Spectralink: [phone]

## 2024-08-23 NOTE — ASSESSMENT & PLAN NOTE
Chronic and controlled on current regimen. Latest blood pressure and vitals reviewed-     Temp:  [96.3 °F (35.7 °C)-98.6 °F (37 °C)]   Pulse:  [78-96]   Resp:  [16-20]   BP: (122-139)/(58-64)   SpO2:  [91 %-97 %] .   Home meds for hypertension were reviewed and noted below.   Hypertension Medications               amLODIPine (NORVASC) 5 MG tablet TAKE 1 TABLET BY MOUTH EVERY DAY    losartan (COZAAR) 100 MG tablet TAKE 1 TABLET BY MOUTH EVERY DAY    metoprolol succinate (TOPROL-XL) 100 MG 24 hr tablet TAKE 1 TABLET BY MOUTH EVERY DAY            While in the hospital, will manage blood pressure as follows; Continue home antihypertensive regimen to treat.     Will utilize p.r.n. blood pressure medication only if patient's blood pressure greater than 180/110 and she develops symptoms such as worsening chest pain or shortness of breath.

## 2024-08-23 NOTE — PROGRESS NOTES
[facility] Medicine  Progress Note    Patient Name: Hannah Montoya  MRN: [id number]  Patient Class: IP- Inpatient   Admission Date: 8/18/2024  Length of Stay: 2 days  Attending Physician: Fanny Meyers MD  Primary Care Provider: Emanuel Chavez MD        Subjective:     Principal Problem:Lytic bone lesion of left femur        HPI:  Ms. Montoya is a 68 yr old female with a hx of HTN, dm type 2, left bundle branch block, CRT-D placement, breast cancer, NSCLC of RLL, obesity, depression, dilated cardiomyopathy, nonischemic cardiomyopathy, COPD, HLD who presents to the ED with a chief complaint of 10/10 constant sharp left hip pain that radiates to her left knee. Patient endorses that left hip has been bothering her over the last month.  She states that she had been using a cane to get around and the pain progressively worsened.  She was seen by her oncologist who recommended gabapentin 300 mg TID.  She also endorses that due to the left knee swelling and left hip pain she had to began using a walker to get around at home.  She states that she was seen by a bone doctor who prescribed meloxicam and gave her a steroid shot in her knee.  About 1 week prior to today she was bringing in groceries and fell on her left side.  She endorses she was walking to the restroom with her cane yesterday and she heard a loud pop and began having excruciating pain and was unable to bear weight or ambulate with her left leg afterwards.  She endorses movement exacerbates her pain.  She states after treatment in the ED her pain is now 4-5/10.  She states that she quit smoking in 2016, rarely drinks alcohol, and denies recreational drug use.  She denies fever, chills, SOB, CP, nausea, vomiting, diarrhea, dysuria, hematuria, lightheadedness, dizziness, and syncope.  Of note, she sees Dr. Avina for Oncology and Dr. Lara for radiation.    Upon arrival to ED, patient afebrile, HR of 92, RR of 19, BP of  "146/82, satting 99% on RA.  Workup in the ED included CBC, CMP, left hip x-ray, CT left hip.  Workup revealed H/H of 9.5/32.4, calcium of 2.7.  Left hip x-ray showed AP pelvis and two views of the left hip demonstrate an oblique lucency in the left intertrochanteric region.  Finding may represent artifact versus nondisplaced fracture.  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  There is no hip dislocation.  There is no displaced fracture.  Degenerative changes are seen in the visualized lower lumbar spine."Patient was given morphine 4 mg IV, ondansetron 4 mg IV, and orphenadrine 60 mg IM while in the ED.  Case discussed with ED provider and patient will be placed under observation for further management.    Overview/Hospital Course:  Orthopedics consulted for lucency noted to left hip on admit on X-ray and patient underwent CT scan of left hip that showed "An expansile osteolytic lesion with beginning cortical destruction is seen intertrochanteric region. There is no hip dislocation. There is no displaced fracture." Orthopedics recommended IM nail to left femur for impending fracture. Patient taken to OR on 8/20/2024 with Dr. Porter Campos and underwent IM nail to left femur. Post-op, patient is weight bear as tolerated to left lower extremity. Patient place don multimodals for pain management. Patient placed on Apixiban 2.5 mg po BID for DVT prophylaxis and will need for 30 days. PT/OT consulted and patient progressed well and therapy recommending low intensity therapy on discharge when medically ready. Plan is home health PT/OT on discharge when medically ready. Likely to be medically ready for 8/23.     Interval History: Patient in good spirits sitting up in bed and family at bedside. Patient reports 3/10 pain to left hip area. Patient progressing well with therapy and plan " home discharge tomorrow with  PT/OT as long as pain remains controlled and Hgb controlled. Labs reviewed and Hb down to 7.8 today from 8.4 yesterday and expected blood loss from surgery. Patient with no clinical signs of bleeding. Magnesium improved to 1.7 today from 1.4 yesterday after IV replacement therapy. Continue oral Magnesium oxide replacement for her magnesium replacement. Patient excited about prospective of going home tomorrow. Blood sugars controlled.     Review of Systems   Constitutional:  Negative for fever.   Respiratory:  Negative for cough and shortness of breath.    Cardiovascular:  Negative for chest pain.   Gastrointestinal:  Negative for nausea and vomiting.   Musculoskeletal:  Positive for arthralgias (Left hip).   Neurological:  Negative for dizziness.   Psychiatric/Behavioral:  Negative for agitation and confusion.      Objective:     Vital Signs (Most Recent):  Temp: 97 °F (36.1 °C) (08/22/24 1935)  Pulse: 86 (08/22/24 1935)  Resp: 16 (08/22/24 1935)  BP: 139/64 (08/22/24 1935)  SpO2: 93 % (08/22/24 1935) on 2 liters of oxygen Vital Signs (24h Range):  Temp:  [96.3 °F (35.7 °C)-98.6 °F (37 °C)] 97 °F (36.1 °C)  Pulse:  [78-96] 86  Resp:  [16-20] 16  SpO2:  [91 %-97 %] 93 %  BP: (122-139)/(58-64) 139/64     Weight: 92.8 kg (204 lb 9.4 oz)  Body mass index is 37.42 kg/m².    Intake/Output Summary (Last 24 hours) at 8/22/2024 2101  Last data filed at 8/22/2024 1014  Gross per 24 hour   Intake 237 ml   Output --   Net 237 ml         Physical Exam  Vitals and nursing note reviewed.   Constitutional:       General: She is awake. She is not in acute distress.     Appearance: Normal appearance. She is well-developed. She is obese.      Interventions: Nasal cannula in place.      Comments: Patient sitting up in bed and family at bedside and in no distress on 2 liters of oxygen via nasal cannula.    Eyes:      Conjunctiva/sclera: Conjunctivae normal.   Neck:      Vascular: No JVD.   Cardiovascular:       Rate and Rhythm: Normal rate and regular rhythm.      Heart sounds: Normal heart sounds. No murmur heard.  Pulmonary:      Effort: Pulmonary effort is normal. No respiratory distress.      Breath sounds: Normal breath sounds. No wheezing, rhonchi or rales.   Abdominal:      General: Abdomen is flat. Bowel sounds are normal. There is no distension.      Palpations: Abdomen is soft.      Tenderness: There is no abdominal tenderness.   Musculoskeletal:      Cervical back: Neck supple.      Right lower leg: No edema.      Left lower leg: No edema.   Skin:     General: Skin is warm.      Capillary Refill: Capillary refill takes less than 2 seconds.      Findings: No erythema.      Comments: Surgical dressings noted on left hip and thigh area. Dressings are clean and dry and intact.   Neurological:      Mental Status: She is alert and oriented to person, place, and time.   Psychiatric:         Behavior: Behavior normal. Behavior is cooperative.         Thought Content: Thought content normal.             Significant Labs: CBC:   Recent Labs   Lab 08/21/24  0325 08/22/24  1412   WBC 7.36 6.98   HGB 8.4* 7.8*   HCT 29.3* 26.4*    191     CMP:   Recent Labs   Lab 08/21/24  0325 08/22/24  1412   * 138   K 4.8 4.9    103   CO2 24 28   * 220*   BUN 22 19   CREATININE 0.9 1.0   CALCIUM 9.0 9.1   PROT 6.5 6.4   ALBUMIN 2.6* 2.4*   BILITOT 0.3 0.2   ALKPHOS 65 74   AST 20 15   ALT 13 11   ANIONGAP 10 7*     Magnesium:   Recent Labs   Lab 08/21/24  0325 08/22/24  1412   MG 1.4* 1.7     Blood Sugars (AccuCheck):    Recent Labs     08/21/24  1251 08/21/24  1814 08/22/24  0047 08/22/24  0622 08/22/24  1156 08/22/24  1727   POCTGLUCOSE 292* 242* 201* 181* 207* 223*       Significant Imaging: I have reviewed all pertinent imaging results/findings within the past 24 hours.    Assessment/Plan:      * Lytic bone lesion of left femur s/p IM nail on 8/20/2024  Patient admitted with left hip pain and found on  imaging to have large lytic lesion to left proximal femur. Ortho consulted and recommended prophylactic IM nail to left femur to prevent impending fracture. Patient with known NSCLC and presumed metastatic lesion from her lung cancer to bone.  - Ortho consulted and patient taken to OR and underwent IM nail to left femur on 8/20/2024 by Dr. Porter Campos.   - Discussed with patient's Oncologist, Dr. Avina and aware of admit, plan for immunotherapy as outpatient for treatment of her cancer.   - Patient placed on Apixiban 2.5 mg po BID post-op for DVT prophylaxis with end date 9/25/2024.   - Patient weight bear as tolerated to left lower extremity and PT/OT consulted. Patient progressing well and doing well and low intensity and discussed with patient wand patient would like to discharge home with  PT/OT when medically ready. She reports she has DME at home.  - Patient placed on multimodal pain meds and bowel regimen post-op. Pain well controlled.   - Surgical bandages to remain in place post-op to left hip until Orthopedic clinic follow-up.     Acute blood loss anemia  Anemia is likely due to acute blood loss which was from surgery and expected blood loss . Most recent hemoglobin and hematocrit are listed below.  Recent Labs     08/20/24  1621 08/21/24  0325 08/22/24  1412   HGB 9.4* 8.4* 7.8*   HCT 32.2* 29.3* 26.4*       Plan  - Monitor serial CBC: Daily  - Transfuse PRBC if patient becomes hemodynamically unstable, symptomatic or H/H drops below 7/21.  - Patient has not received any PRBC transfusions to date.  - Patient's anemia is currently worsening. Will continue to monitor for now.    Hypomagnesemia  Improving with replacement. Patient has Abnormal Magnesium: hypomagnesemia. Will continue to monitor electrolytes closely. Will replace the affected electrolytes with IV and oral Magnesium replacement and repeat labs to be done after interventions completed. The patient's magnesium results have been  reviewed and are listed below.  Recent Labs   Lab 08/22/24  1412   MG 1.7         Malignant neoplasm metastatic to bone  Patient has metastatic cancer of NSCLC primary. The cancer has metastasized to bone. The patient is under the care of an outpatient oncologist. The patient is undergoing active treatment. Their staging information is listed below.   Cancer Staging   NSCLC of right lung  Staging form: Lung, AJCC 8th Edition  - Clinical stage from 9/29/2020: Stage IIIA (cT3, cN1, cM0) - Signed by Ramo Tucker MD on 10/24/2020  - Clinical stage from 7/31/2024: Stage DEREK (cT3, cN2, cM1a) - Signed by Javi Avina MD on 8/2/2024        NSCLC of right lung  - Patient followed by Dr. Avina with Heme/Onc and diagnosed in 2021, has been on multiple regmiens per notes, RLL, hilar adenopathy known. Had more left hip pain recently and had scans showing likely progression with this lesion that now is showing impending fracture to proximal left femur.  - Messaged Dr. Avina 8/20 about surgery and plan for immunotherapy that does not need to hold as does not cause wound healing issues per him, but may have to hold if at post acute care and and touch base with him after that is in to determine when best to follow up. Patient progressing well and plan HH PT/OT on discharge and will message Dr. Avina on discharge to let him know of discharge disposition so can arrange outpatient follow-up with patient to discuss further cancer treatment.       Essential hypertension  Chronic and controlled on current regimen. Latest blood pressure and vitals reviewed-     Temp:  [96.3 °F (35.7 °C)-98.6 °F (37 °C)]   Pulse:  [78-96]   Resp:  [16-20]   BP: (122-139)/(58-64)   SpO2:  [91 %-97 %] .   Home meds for hypertension were reviewed and noted below.   Hypertension Medications               amLODIPine (NORVASC) 5 MG tablet TAKE 1 TABLET BY MOUTH EVERY DAY    losartan (COZAAR) 100 MG tablet TAKE 1 TABLET BY MOUTH EVERY DAY     metoprolol succinate (TOPROL-XL) 100 MG 24 hr tablet TAKE 1 TABLET BY MOUTH EVERY DAY            While in the hospital, will manage blood pressure as follows; Continue home antihypertensive regimen to treat.     Will utilize p.r.n. blood pressure medication only if patient's blood pressure greater than 180/110 and she develops symptoms such as worsening chest pain or shortness of breath.    Type 2 diabetes mellitus without complication  Blood sugars controlled in hospital.   Last A1c reviewed-   Lab Results   Component Value Date    HGBA1C 6.1 (H) 08/19/2024   Blood Sugars (AccuCheck):    Recent Labs     08/21/24  1251 08/21/24  1814 08/22/24  0047 08/22/24  0622 08/22/24  1156 08/22/24  1727   POCTGLUCOSE 292* 242* 201* 181* 207* 223*     Current correctional scale  Medium  Maintain anti-hyperglycemic dose as follows-   Antihyperglycemics (From admission, onward)      Start     Stop Route Frequency Ordered    08/19/24 0526  insulin aspart U-100 pen 0-10 Units         -- SubQ Every 6 hours PRN 08/19/24 0429          Hold Oral hypoglycemics while patient is in the hospital.  Monitor blood sugars with meals and at bedtime and cover with SSI.  Diabetic diet.  Target blood sugars 140-180 in hospital.     Class 2 severe obesity due to excess calories with serious comorbidity in adult  Body mass index is 37.42 kg/m². Morbid obesity complicates all aspects of disease management from diagnostic modalities to treatment. Weight loss encouraged and health benefits explained to patient.         Moderate episode of recurrent major depressive disorder  Chronic and controlled. Continue home Zoloft and Wellbutrin to treat.         Left bundle-branch block  Patient with known LBBB at baseline.       NICM (nonischemic cardiomyopathy)  Cardiac resynchronization therapy defibrillator (CRT-D) in place   Patient with known NICM and well controlled. Patient with CRT-D defibrillator in place. Patient euvolemic. Continue home Losartan and  Toprol XL to treat.       VTE Risk Mitigation (From admission, onward)           Ordered     apixaban tablet 2.5 mg  2 times daily         08/20/24 1531     Place sequential compression device  Until discontinued         08/20/24 1006     Place sequential compression device  Until discontinued         08/19/24 0429                    Discharge Planning   GUERRERO: 8/23/2024     Code Status: Full Code   Is the patient medically ready for discharge?:     Reason for patient still in hospital (select all that apply): Patient trending condition  Discharge Plan A: Home Health, Home with family likely for 8/23.          Fanny Meyers MD  Department of Hospital Medicine   [facility] - Surgery

## 2024-08-23 NOTE — ASSESSMENT & PLAN NOTE
- Patient followed by Dr. Avina with Heme/Onc and diagnosed in 2021, has been on multiple regmiens per notes, RLL, hilar adenopathy known. Had more left hip pain recently and had scans showing likely progression with this lesion that now is showing impending fracture to proximal left femur.  - Messaged Dr. Avina 8/20 about surgery and plan for immunotherapy that does not need to hold as does not cause wound healing issues per him, but may have to hold if at post acute care and and touch base with him after that is in to determine when best to follow up. Patient progressing well and plan HH PT/OT on discharge and will message Dr. Avina on discharge to let him know of discharge disposition so can arrange outpatient follow-up with patient to discuss further cancer treatment.

## 2024-08-23 NOTE — PLAN OF CARE
08/23/24 1258   Post-Acute Status   Post-Acute Authorization Home Health   Home Health Status Set-up Complete/Auth obtained   Discharge Plan   Discharge Plan A Home Health     Patient assigned to [facility],  order in.    Kelsie Oseguera LMSW  Case Management   [facility]   Ext. [phone]

## 2024-08-23 NOTE — ASSESSMENT & PLAN NOTE
Blood sugars controlled in hospital.   Last A1c reviewed-   Lab Results   Component Value Date    HGBA1C 6.1 (H) 08/19/2024   Blood Sugars (AccuCheck):    Recent Labs     08/21/24  1251 08/21/24  1814 08/22/24  0047 08/22/24  0622 08/22/24  1156 08/22/24  1727   POCTGLUCOSE 292* 242* 201* 181* 207* 223*     Current correctional scale  Medium  Maintain anti-hyperglycemic dose as follows-   Antihyperglycemics (From admission, onward)      Start     Stop Route Frequency Ordered    08/19/24 0526  insulin aspart U-100 pen 0-10 Units         -- SubQ Every 6 hours PRN 08/19/24 0429          Hold Oral hypoglycemics while patient is in the hospital.  Monitor blood sugars with meals and at bedtime and cover with SSI.  Diabetic diet.  Target blood sugars 140-180 in hospital.

## 2024-08-23 NOTE — ASSESSMENT & PLAN NOTE
Patient admitted with left hip pain and found on imaging to have large lytic lesion to left proximal femur. Ortho consulted and recommended prophylactic IM nail to left femur to prevent impending fracture. Patient with known NSCLC and presumed metastatic lesion from her lung cancer to bone.  - Ortho consulted and patient taken to OR and underwent IM nail to left femur on 8/20/2024 by Dr. Porter Campos.   - Discussed with patient's Oncologist, Dr. Avina and aware of admit, plan for immunotherapy as outpatient for treatment of her cancer.   - Patient placed on Apixiban 2.5 mg po BID post-op for DVT prophylaxis with end date 9/25/2024.   - Patient weight bear as tolerated to left lower extremity and PT/OT consulted. Patient progressing well and doing well and low intensity and discussed with patient wanpetty patient would like to discharge home with  PT/OT when medically ready. She reports she has DME at home.  - Patient placed on multimodal pain meds and bowel regimen post-op. Pain well controlled.   - Surgical bandages to remain in place post-op to left hip until Orthopedic clinic follow-up.

## 2024-08-23 NOTE — ASSESSMENT & PLAN NOTE
Anemia is likely due to acute blood loss which was from surgery and expected blood loss . Most recent hemoglobin and hematocrit are listed below.  Recent Labs     08/20/24  1621 08/21/24  0325 08/22/24  1412   HGB 9.4* 8.4* 7.8*   HCT 32.2* 29.3* 26.4*       Plan  - Monitor serial CBC: Daily  - Transfuse PRBC if patient becomes hemodynamically unstable, symptomatic or H/H drops below 7/21.  - Patient has not received any PRBC transfusions to date.  - Patient's anemia is currently worsening. Will continue to monitor for now.

## 2024-08-23 NOTE — PLAN OF CARE
Problem: Physical Therapy  Goal: Physical Therapy Goal  Description: PT goals until 9/2/24    1. Pt supine to sit with CGA-not met  2. Pt sit to supine with CGA-not met  3. Pt sit to stand with RW with WBAT L LE with CGA- met 8/23      3b. Sit to stand with RW with WBAT LLE with supervision.  4. Pt to perform gait 100ft with RW with WBAT L LE with CGA.-not met  5. Pt to go up/down curb step with RW with WBAT L LE with minimal assist.-not met  6. Pt to perform B LE exs in sitting or supine x 10 reps to strengthen B LE to improve functional mobility.-not met    DME Justifications (see above for complete DME recommendations)    Bedside Commode- Patient has a mobility limitation that significantly impairs their ability to participate in one or more mobility related activities of daily living, including toileting. This deficit can be resolved by using a bedside commode. Patient demonstrates mobility limitations that will cause them to be confined to one room at home without bathroom access for up to 30 days. Using a bedside commode will greatly improve the patient's ability to participate in MRADLs.     Rolling Walker- Patient demonstrates a mobility limitation that significantly impairs their ability to participate in one or more mobility related activities of daily living. Patient's mobility limitation cannot be sufficiently resolved with the use of a cane, but can be sufficiently resolved with the use of a rolling walker.The use of a rolling walker will considerably improve their ability to participate in MRADLs. Patient will use the walker on a regular basis at home.        Outcome: Progressing   8/23/2024

## 2024-08-23 NOTE — ASSESSMENT & PLAN NOTE
Improving with replacement. Patient has Abnormal Magnesium: hypomagnesemia. Will continue to monitor electrolytes closely. Will replace the affected electrolytes with IV and oral Magnesium replacement and repeat labs to be done after interventions completed. The patient's magnesium results have been reviewed and are listed below.  Recent Labs   Lab 08/22/24  1412   MG 1.7

## 2024-08-23 NOTE — ASSESSMENT & PLAN NOTE
Body mass index is 37.42 kg/m². Morbid obesity complicates all aspects of disease management from diagnostic modalities to treatment. Weight loss encouraged and health benefits explained to patient.

## 2024-08-23 NOTE — SUBJECTIVE & OBJECTIVE
Interval History: Patient in good spirits sitting up in bed and family at bedside. Patient reports 3/10 pain to left hip area. Patient progressing well with therapy and plan home discharge tomorrow with HH PT/OT as long as pain remains controlled and Hgb controlled. Labs reviewed and Hb down to 7.8 today from 8.4 yesterday and expected blood loss from surgery. Patient with no clinical signs of bleeding. Magnesium improved to 1.7 today from 1.4 yesterday after IV replacement therapy. Continue oral Magnesium oxide replacement for her magnesium replacement. Patient excited about prospective of going home tomorrow. Blood sugars controlled.     Review of Systems   Constitutional:  Negative for fever.   Respiratory:  Negative for cough and shortness of breath.    Cardiovascular:  Negative for chest pain.   Gastrointestinal:  Negative for nausea and vomiting.   Musculoskeletal:  Positive for arthralgias (Left hip).   Neurological:  Negative for dizziness.   Psychiatric/Behavioral:  Negative for agitation and confusion.      Objective:     Vital Signs (Most Recent):  Temp: 97 °F (36.1 °C) (08/22/24 1935)  Pulse: 86 (08/22/24 1935)  Resp: 16 (08/22/24 1935)  BP: 139/64 (08/22/24 1935)  SpO2: 93 % (08/22/24 1935) on 2 liters of oxygen Vital Signs (24h Range):  Temp:  [96.3 °F (35.7 °C)-98.6 °F (37 °C)] 97 °F (36.1 °C)  Pulse:  [78-96] 86  Resp:  [16-20] 16  SpO2:  [91 %-97 %] 93 %  BP: (122-139)/(58-64) 139/64     Weight: 92.8 kg (204 lb 9.4 oz)  Body mass index is 37.42 kg/m².    Intake/Output Summary (Last 24 hours) at 8/22/2024 2101  Last data filed at 8/22/2024 1014  Gross per 24 hour   Intake 237 ml   Output --   Net 237 ml         Physical Exam  Vitals and nursing note reviewed.   Constitutional:       General: She is awake. She is not in acute distress.     Appearance: Normal appearance. She is well-developed. She is obese.      Interventions: Nasal cannula in place.      Comments: Patient sitting up in bed and family  at bedside and in no distress on 2 liters of oxygen via nasal cannula.    Eyes:      Conjunctiva/sclera: Conjunctivae normal.   Neck:      Vascular: No JVD.   Cardiovascular:      Rate and Rhythm: Normal rate and regular rhythm.      Heart sounds: Normal heart sounds. No murmur heard.  Pulmonary:      Effort: Pulmonary effort is normal. No respiratory distress.      Breath sounds: Normal breath sounds. No wheezing, rhonchi or rales.   Abdominal:      General: Abdomen is flat. Bowel sounds are normal. There is no distension.      Palpations: Abdomen is soft.      Tenderness: There is no abdominal tenderness.   Musculoskeletal:      Cervical back: Neck supple.      Right lower leg: No edema.      Left lower leg: No edema.   Skin:     General: Skin is warm.      Capillary Refill: Capillary refill takes less than 2 seconds.      Findings: No erythema.      Comments: Surgical dressings noted on left hip and thigh area. Dressings are clean and dry and intact.   Neurological:      Mental Status: She is alert and oriented to person, place, and time.   Psychiatric:         Behavior: Behavior normal. Behavior is cooperative.         Thought Content: Thought content normal.             Significant Labs: CBC:   Recent Labs   Lab 08/21/24  0325 08/22/24  1412   WBC 7.36 6.98   HGB 8.4* 7.8*   HCT 29.3* 26.4*    191     CMP:   Recent Labs   Lab 08/21/24  0325 08/22/24  1412   * 138   K 4.8 4.9    103   CO2 24 28   * 220*   BUN 22 19   CREATININE 0.9 1.0   CALCIUM 9.0 9.1   PROT 6.5 6.4   ALBUMIN 2.6* 2.4*   BILITOT 0.3 0.2   ALKPHOS 65 74   AST 20 15   ALT 13 11   ANIONGAP 10 7*     Magnesium:   Recent Labs   Lab 08/21/24  0325 08/22/24  1412   MG 1.4* 1.7     Blood Sugars (AccuCheck):    Recent Labs     08/21/24  1251 08/21/24  1814 08/22/24  0047 08/22/24  0622 08/22/24  1156 08/22/24  1727   POCTGLUCOSE 292* 242* 201* 181* 207* 223*       Significant Imaging: I have reviewed all pertinent imaging  results/findings within the past 24 hours.

## 2024-08-23 NOTE — ASSESSMENT & PLAN NOTE
Patient has metastatic cancer of NSCLC primary. The cancer has metastasized to bone. The patient is under the care of an outpatient oncologist. The patient is undergoing active treatment. Their staging information is listed below.   Cancer Staging   NSCLC of right lung  Staging form: Lung, AJCC 8th Edition  - Clinical stage from 9/29/2020: Stage IIIA (cT3, cN1, cM0) - Signed by Ramo Tucker MD on 10/24/2020  - Clinical stage from 7/31/2024: Stage DEREK (cT3, cN2, cM1a) - Signed by Javi Avina MD on 8/2/2024

## 2024-08-25 NOTE — ASSESSMENT & PLAN NOTE
Chronic and controlled on current regimen. Latest blood pressure and vitals reviewed-      .   Home meds for hypertension were reviewed and noted below.   Hypertension Medications               amLODIPine (NORVASC) 5 MG tablet TAKE 1 TABLET BY MOUTH EVERY DAY    losartan (COZAAR) 100 MG tablet TAKE 1 TABLET BY MOUTH EVERY DAY    metoprolol succinate (TOPROL-XL) 100 MG 24 hr tablet TAKE 1 TABLET BY MOUTH EVERY DAY            Continue home antihypertensive regimen to treat on discharge.

## 2024-08-25 NOTE — ASSESSMENT & PLAN NOTE
Blood sugars controlled in hospital. Home Metformin held on admit and to resume on hospital discharge.   Last A1c reviewed-   Lab Results   Component Value Date    HGBA1C 6.1 (H) 08/19/2024   Blood Sugars (AccuCheck):    Recent Labs     08/22/24  0047 08/22/24  0622 08/22/24  1156 08/22/24  1727 08/23/24  0732 08/23/24  1156   POCTGLUCOSE 201* 181* 207* 223* 186* 194*

## 2024-08-25 NOTE — ASSESSMENT & PLAN NOTE
Patient discharged on Magnesium oxide 400 mg po BID to treat for 7 days. Improving with replacement. Patient has Abnormal Magnesium: hypomagnesemia. Will continue to monitor electrolytes closely. Will replace the affected electrolytes with IV and oral Magnesium replacement and repeat labs to be done after interventions completed.

## 2024-08-25 NOTE — DISCHARGE SUMMARY
[facility] Medicine  Discharge Summary      Patient Name: Hannah Montoya  MRN: [id number]  [facility]: [id number]  Patient Class: IP- Inpatient  Admission Date: 8/18/2024  Hospital Length of Stay: 3 days  Discharge Date and Time: 8/23/2024  4:42 PM  Attending Physician: Fanny Meyers MD   Discharging Provider: Fanny Meyers MD  Primary Care Provider: Emanuel Chavez MD  [facility] Medicine Team: [facility] Fanny Meyers MD  Primary Care Team: [facility]    HPI:   Ms. Montoya is a 68 yr old female with a hx of HTN, dm type 2, left bundle branch block, CRT-D placement, breast cancer, NSCLC of RLL, obesity, depression, dilated cardiomyopathy, nonischemic cardiomyopathy, COPD, HLD who presents to the ED with a chief complaint of 10/10 constant sharp left hip pain that radiates to her left knee. Patient endorses that left hip has been bothering her over the last month.  She states that she had been using a cane to get around and the pain progressively worsened.  She was seen by her oncologist who recommended gabapentin 300 mg TID.  She also endorses that due to the left knee swelling and left hip pain she had to began using a walker to get around at home.  She states that she was seen by a bone doctor who prescribed meloxicam and gave her a steroid shot in her knee.  About 1 week prior to today she was bringing in groceries and fell on her left side.  She endorses she was walking to the restroom with her cane yesterday and she heard a loud pop and began having excruciating pain and was unable to bear weight or ambulate with her left leg afterwards.  She endorses movement exacerbates her pain.  She states after treatment in the ED her pain is now 4-5/10.  She states that she quit smoking in 2016, rarely drinks alcohol, and denies recreational drug use.  She denies fever, chills, SOB, CP, nausea, vomiting, diarrhea, dysuria, hematuria, lightheadedness, dizziness, and syncope.   "Of note, she sees Dr. Avina for Oncology and Dr. Lara for radiation.    Upon arrival to ED, patient afebrile, HR of 92, RR of 19, BP of 146/82, satting 99% on RA.  Workup in the ED included CBC, CMP, left hip x-ray, CT left hip.  Workup revealed H/H of 9.5/32.4, calcium of 2.7.  Left hip x-ray showed AP pelvis and two views of the left hip demonstrate an oblique lucency in the left intertrochanteric region.  Finding may represent artifact versus nondisplaced fracture.  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  There is no hip dislocation.  There is no displaced fracture.  Degenerative changes are seen in the visualized lower lumbar spine."Patient was given morphine 4 mg IV, ondansetron 4 mg IV, and orphenadrine 60 mg IM while in the ED.  Case discussed with ED provider and patient will be placed under observation for further management.      8/20/2024  Procedure(s) (LRB):  INSERTION, INTRAMEDULLARY ANTOINE, FEMUR (Left)  BIOPSY, FEMUR (Left)    Surgeon(s):  ROBYN Mai MD Sugalski, Christopher B., MD      Hospital Course:   Orthopedics consulted for lucency noted to left hip on admit on X-ray and patient underwent CT scan of left hip that showed "An expansile osteolytic lesion with beginning cortical destruction is seen intertrochanteric region. There is no hip dislocation. There is no displaced fracture." Orthopedics recommended IM nail to left femur for impending fracture. Patient taken to OR on 8/20/2024 with Dr. Porter Campos and underwent IM nail to left femur. Post-op, patient is weight bear as tolerated to left lower extremity. Patient placed on multimodals for pain management. Patient placed on Apixiban 2.5 mg po BID for DVT prophylaxis and will need for 30 days. PT/OT consulted and patient progressed well and therapy recommending low intensity therapy on discharge " when medically ready. Plan is home health PT/OT on discharge when medically ready. Patient's pain well controlled and ambulating well with walker with PT/OT so discharged home in good condition on 8/23.  PT/OT arranged on discharge. Patient needed no additional DME as had DME at home. Patient to follow-up with her oncologist as outpatient to discuss her cancer treatment. Surgical bandages to remain in place until Orthopedic clinic follow-up. Patient discharged on 30 days of Apixiban for DVT prophylaxis after hip surgery.      Goals of Care Treatment Preferences:  Code Status: Full Code    Health care agent: Elizabeth Bob  [facility] care agent number: [phone]    Living Will: Yes              SDOH Screening:  The patient was screened for food insecurity, housing instability, transportation needs, utility difficulties, and interpersonal safety. The social determinant(s) of health identified as a concern this admission are:  Food insecurity  Utility difficulties      Social Determinants of Health with Concerns     Food Insecurity: Food Insecurity Present (8/22/2024)   Utilities: At Risk (8/22/2024)        Consults:   Consults (From admission, onward)          Status Ordering Provider     Inpatient consult to Orthopedic Surgery  Once        Provider:  (Not yet assigned)    STEPHEN Tsai     Inpatient consult to Orthopedics  Once        Provider:  Lester Mary MD    Completed MARIBEL IRELAND            Psychiatric  Moderate episode of recurrent major depressive disorder  Chronic and controlled. Continue home Zoloft and Wellbutrin to treat on discharge.        Cardiac/Vascular  Cardiac resynchronization therapy defibrillator (CRT-D) in place  - Hx noted      NICM (nonischemic cardiomyopathy)  Cardiac resynchronization therapy defibrillator (CRT-D) in place   Patient with known NICM and well controlled. Patient with CRT-D defibrillator in place. Patient euvolemic. Continue home Losartan and Toprol  XL to treat on discharge.     Left bundle-branch block  Patient with known LBBB at baseline.       Essential hypertension  Chronic and controlled on current regimen. Latest blood pressure and vitals reviewed-      .   Home meds for hypertension were reviewed and noted below.   Hypertension Medications               amLODIPine (NORVASC) 5 MG tablet TAKE 1 TABLET BY MOUTH EVERY DAY    losartan (COZAAR) 100 MG tablet TAKE 1 TABLET BY MOUTH EVERY DAY    metoprolol succinate (TOPROL-XL) 100 MG 24 hr tablet TAKE 1 TABLET BY MOUTH EVERY DAY            Continue home antihypertensive regimen to treat on discharge.     Renal/  Hypomagnesemia  Patient discharged on Magnesium oxide 400 mg po BID to treat for 7 days. Improving with replacement. Patient has Abnormal Magnesium: hypomagnesemia. Will continue to monitor electrolytes closely. Will replace the affected electrolytes with IV and oral Magnesium replacement and repeat labs to be done after interventions completed.       Oncology  Malignant neoplasm metastatic to bone  Patient has metastatic cancer of NSCLC primary. The cancer has metastasized to bone. The patient is under the care of an outpatient oncologist. The patient is undergoing active treatment. Their staging information is listed below.    Cancer Staging   NSCLC of right lung  Staging form: Lung, AJCC 8th Edition  - Clinical stage from 9/29/2020: Stage IIIA (cT3, cN1, cM0) - Signed by Ramo Tucker MD on 10/24/2020  - Clinical stage from 7/31/2024: Stage DEREK (cT3, cN2, cM1a) - Signed by Javi Avina MD on 8/2/2024        NSCLC of right lung  - Patient followed by Dr. Avina with Heme/Onc and diagnosed in 2021, has been on multiple regmiens per notes, RLL, hilar adenopathy known. Had more left hip pain recently and had scans showing likely progression with this lesion that now is showing impending fracture to proximal left femur.  - Messaged Dr. Avina 8/20 about surgery and plan for immunotherapy  that does not need to hold as does not cause wound healing issues per him, but may have to hold if at post acute care and and touch base with him after that is in to determine when best to follow up. Patient progressing well and plan HH PT/OT on discharge and will message Dr. Avina on discharge to let him know of discharge disposition so can arrange outpatient follow-up with patient to discuss further cancer treatment.       Acute blood loss anemia  Controlled on discharge. Anemia is likely due to acute blood loss which was from surgery and expected blood loss . Most recent hemoglobin and hematocrit are listed below.  Recent Labs     08/22/24  1412 08/23/24  0549   HGB 7.8* 8.1*   HCT 26.4* 27.3*       Plan  - Transfuse PRBC if patient becomes hemodynamically unstable, symptomatic or H/H drops below 7/21.  - Patient has not received any PRBC transfusions to date.  - Patient's anemia is currently improving on discharge.    Endocrine  Class 2 severe obesity due to excess calories with serious comorbidity in adult  Body mass index is 37.42 kg/m². Morbid obesity complicates all aspects of disease management from diagnostic modalities to treatment. Weight loss encouraged and health benefits explained to patient.         Type 2 diabetes mellitus without complication  Blood sugars controlled in hospital. Home Metformin held on admit and to resume on hospital discharge.   Last A1c reviewed-   Lab Results   Component Value Date    HGBA1C 6.1 (H) 08/19/2024   Blood Sugars (AccuCheck):    Recent Labs     08/22/24  0047 08/22/24  0622 08/22/24  1156 08/22/24  1727 08/23/24  0732 08/23/24  1156   POCTGLUCOSE 201* 181* 207* 223* 186* 194*          Orthopedic  * Lytic bone lesion of left femur s/p IM nail on 8/20/2024  Patient admitted with left hip pain and found on imaging to have large lytic lesion to left proximal femur. Ortho consulted and recommended prophylactic IM nail to left femur to prevent impending fracture. Patient  with known NSCLC and presumed metastatic lesion from her lung cancer to bone.  - Ortho consulted and patient taken to OR and underwent IM nail to left femur on 8/20/2024 by Dr. Porter Campos.   - Discussed with patient's Oncologist, Dr. Avina and aware of admit, plan for immunotherapy as outpatient for treatment of her cancer.   - Patient placed on Apixiban 2.5 mg po BID post-op for DVT prophylaxis with end date 9/22/2024 and discharged on this medication.   - Patient weight bear as tolerated to left lower extremity post-op and continue on discharge. PT/OT consulted post-op. Patient progressing well and doing well and low intensity and discussed with patient wand patient would like to discharge home with  PT/OT when medically ready. She reports she has DME at home. Patient discharged home in good condition with  PT/OT on 8/23.   - Patient placed on multimodal pain meds and bowel regimen post-op. Pain well controlled and continue multimodal pain meds on discharge.   - Surgical bandages to remain in place post-op to left hip until Orthopedic clinic follow-up.   - Biopsy/pathology from left femur pending at time of discharge.       Final Active Diagnoses:    Diagnosis Date Noted POA    PRINCIPAL PROBLEM:  Lytic bone lesion of left femur s/p IM nail on 8/20/2024 [M89.9] 08/19/2024 Yes    Acute blood loss anemia [D62] 08/21/2024 No    Hypomagnesemia [E83.42] 08/20/2024 Yes    Malignant neoplasm metastatic to bone [C79.51] 08/21/2024 Yes    NSCLC of right lung [C34.91]  Yes    Essential hypertension [I10] 10/07/2014 Yes    Type 2 diabetes mellitus without complication [E11.9] 07/05/2016 Yes    Class 2 severe obesity due to excess calories with serious comorbidity in adult [E66.01]  Yes    Moderate episode of recurrent major depressive disorder [F33.1] 06/03/2023 Yes    Left bundle-branch block [I44.7] 08/14/2017 Yes    NICM (nonischemic cardiomyopathy) [I42.8] 05/02/2018 Yes    Pathological fracture of  intertrochanteric section of femur [M84.453A] 08/20/2024 Yes    Cardiac resynchronization therapy defibrillator (CRT-D) in place [Z95.810] 09/30/2019 Yes      Problems Resolved During this Admission:    Diagnosis Date Noted Date Resolved POA    History of breast cancer in female [Z85.3] 02/03/2020 08/21/2024 Not Applicable       Discharged Condition: good    Disposition: Home-Health Care c    Follow Up:  Future Appointments   Date Time Provider Department Center   9/3/2024  3:15 PM Corey Ceron, NP [facility] ORTHO [address] [facility]   9/9/2024  8:20 AM Emanuel Chavez MD [facility] FAM MED [place]   9/11/2024  9:30 AM LAB, [facility] LAB [facility]   9/12/2024  9:00 AM Aide Magaña NP [facility] HEMONC3 [facility]   9/12/2024 10:00 AM CHAIR 4, [facility] B[facility] INF [facility] BMT INF [facility]   10/2/2024 10:30 AM Coery Ceron NP N[facility] ORTHO [address]   10/2/2024 11:00 AM [facility] MAMMO1 [facility] M[facility]MO [place]   10/3/2024 10:05 AM LAB, [facility]   10/10/2024 11:00 AM 3PREPERVIN [facility]   10/24/2024 10:30 AM Julianne Jaurez OD [facility] OPTOMTY [place]        Follow-up Information       [place], [facility] - Follow up.    Specialty: Home Health Services  Contact information:  [address] [place] [address].  [address]  [place] [id number] [phone]                           Patient Instructions:      Diet diabetic     Notify your health care provider if you experience any of the following:  temperature >100.4     Notify your health care provider if you experience any of the following:  persistent nausea and vomiting or diarrhea     Notify your health care provider if you experience any of the following:  severe uncontrolled pain     Notify your health care provider if you experience any of the following:  redness, tenderness, or signs of infection (pain, swelling, redness, odor or green/yellow discharge around incision site)     Notify your health care provider if you experience any of the  following:  difficulty breathing or increased cough     Notify your health care provider if you experience any of the following:  severe persistent headache     Notify your health care provider if you experience any of the following:  worsening rash     Notify your health care provider if you experience any of the following:  persistent dizziness, light-headedness, or visual disturbances     Notify your health care provider if you experience any of the following:  increased confusion or weakness     No driving until:     Leave dressing on - Keep it clean, dry, and intact until clinic visit     Weight bearing restrictions (specify):   Order Comments: Weight bear as tolerated to left lower extremity with walker       Significant Diagnostic Studies: Labs: CMP   Recent Labs   Lab 08/23/24  0549      K 4.9      CO2 29   *   BUN 15   CREATININE 0.8   CALCIUM 9.6   PROT 6.5   ALBUMIN 2.3*   BILITOT 0.2   ALKPHOS 81   AST 13   ALT 10   ANIONGAP 7*    and CBC   Recent Labs   Lab 08/23/24  0549   WBC 6.51   HGB 8.1*   HCT 27.3*          Pending Diagnostic Studies:       Procedure Component Value Units Date/Time    Specimen to Pathology, Surgery Orthopedics [[id number]] Collected: 08/20/24 1322    Order Status: Sent Lab Status: In process Updated: 08/21/24 0843    Specimen: Tissue            Medications:  Reconciled Home Medications:      Medication List        START taking these medications      acetaminophen 500 MG tablet  Commonly known as: TYLENOL  Take 2 tablets (1,000 mg total) by mouth every 8 (eight) hours as needed for Pain.     celecoxib 200 MG capsule  Commonly known as: CeleBREX  Take 1 capsule (200 mg total) by mouth once daily.     cholecalciferol (vitamin D3) 50 mcg (2,000 unit) Tab  Commonly known as: VITAMIN D3  Take 1 tablet (2,000 Units total) by mouth once daily.     ELIQUIS 2.5 mg Tab  Generic drug: apixaban  Take 1 tablet (2.5 mg total) by mouth 2 (two) times daily. DVT  prophylaxis after hip surgery. End date 9/22/2024.     magnesium oxide 400 mg (241.3 mg magnesium) tablet  Commonly known as: MAG-OX  Take 1 tablet (400 mg total) by mouth 2 (two) times daily. for 7 days     methocarbamoL 750 MG Tab  Commonly known as: ROBAXIN  Take 1 tablet (750 mg total) by mouth 4 (four) times daily.     oxyCODONE 5 MG immediate release tablet  Commonly known as: ROXICODONE  Take 1 tablet (5 mg total) by mouth every 4 (four) hours as needed for Pain.     STOOL SOFTENER-LAXATIVE 8.6-50 mg per tablet  Generic drug: senna-docusate 8.6-50 mg  Take 1 tablet by mouth once daily. for 14 days            CONTINUE taking these medications      ACCU-CHEK ALFRED PLUS TEST STRP Strp  Generic drug: blood sugar diagnostic  USE TO TEST THREE TIMES DAILY     * albuterol 1.25 mg/3 mL Nebu  Commonly known as: ACCUNEB  use 1 AMPULE (3ml) via NEBULIZER EVERY 6 HOURS AS NEEDED     * albuterol 90 mcg/actuation inhaler  Commonly known as: VENTOLIN HFA  Inhale 2 puffs into the lungs every 4 (four) hours as needed for Wheezing or Shortness of Breath. Rescue     amLODIPine 5 MG tablet  Commonly known as: NORVASC  TAKE 1 TABLET BY MOUTH EVERY DAY     atorvastatin 10 MG tablet  Commonly known as: LIPITOR  TAKE 1 TABLET BY MOUTH EVERY DAY     benzonatate 200 MG capsule  Commonly known as: TESSALON  Take 1 capsule (200 mg total) by mouth 3 (three) times daily as needed for Cough.     buPROPion 150 MG TB24 tablet  Commonly known as: WELLBUTRIN XL  TAKE 1 TABLET BY MOUTH EVERY DAY     cetirizine 10 MG tablet  Commonly known as: ZYRTEC  Take 10 mg by mouth every evening.     gabapentin 300 MG capsule  Commonly known as: NEURONTIN  Take 1 capsule (300 mg total) by mouth 3 (three) times daily.     losartan 100 MG tablet  Commonly known as: COZAAR  TAKE 1 TABLET BY MOUTH EVERY DAY     meloxicam 15 MG tablet  Commonly known as: MOBIC  Take 1 tablet (15 mg total) by mouth once daily.     metFORMIN 500 MG tablet  Commonly known as:  GLUCOPHAGE  TAKE 2 TABLETS BY MOUTH TWICE A DAY WITH MEALS     metoprolol succinate 100 MG 24 hr tablet  Commonly known as: TOPROL-XL  TAKE 1 TABLET BY MOUTH EVERY DAY     multivitamin per tablet  Commonly known as: THERAGRAN  Take 1 tablet by mouth once daily.     ondansetron 8 MG Tbdl  Commonly known as: ZOFRAN-ODT  Take 1 tablet (8 mg total) by mouth every 8 (eight) hours as needed (nausea/vomiting). Take 1 tablet (8 mg) by mouth every 8 hours as needed for nausea/vomiting.     palonosetron 0.25 mg/5 mL injection  Commonly known as: ALOXI     pantoprazole 40 MG tablet  Commonly known as: PROTONIX  TAKE 1 TABLET BY MOUTH EVERY DAY     * READI-CAT 2 2 % (w/v) suspension  Generic drug: barium sulfate     * READI-CAT 2 2.1 % (w/v), 2.0 % (w/w) suspension  Generic drug: barium     sertraline 100 MG tablet  Commonly known as: ZOLOFT  TAKE 1 TABLET BY MOUTH EVERY DAY IN THE EVENING     tiotropium 18 mcg inhalation capsule  Commonly known as: SPIRIVA WITH HANDIHALER  INHALE THE CONTENTS OF 1 CAPSULE BY MOUTH EVERY DAY     WIXELA INHUB 250-50 mcg/dose diskus inhaler  Generic drug: fluticasone-salmeterol 250-50 mcg/dose  INHALE 1 PUFF INTO THE LUNGS 2 (TWO) TIMES DAILY. CONTROLLER           * This list has 4 medication(s) that are the same as other medications prescribed for you. Read the directions carefully, and ask your doctor or other care provider to review them with you.                STOP taking these medications      betamethasone dipropionate 0.05 % cream     FLUAD QUAD 2023-24(65Y UP)(PF) 60 mcg (15 mcg x 4)/0.5 mL Syrg  Generic drug: flu vac 2023 [id number]-[id number](PF)     mupirocin 2 % ointment  Commonly known as: BACTROBAN     OMNIPAQUE 300 300 mg iodine/mL injection  Generic drug: iohexoL     OMNIPAQUE 350 350 mg iodine/mL Soln injection  Generic drug: iohexoL     OPDIVO 120 mg/12 mL Soln  Generic drug: nivolumab     OPDIVO 240 mg/24 mL Soln  Generic drug: nivolumab     OPDIVO 40 mg/4 mL injection  Generic drug:  nivolumab     semaglutide 0.25 mg or 0.5 mg(2 mg/1.5 mL) pen injector  Commonly known as: OZEMPIC     triamcinolone acetonide 0.1% 0.1 % cream  Commonly known as: KENALOG     YERVOY 50 mg/10 mL (5 mg/mL)  Generic drug: ipilimumab              Indwelling Lines/Drains at time of discharge:   Lines/Drains/Airways       None                 31 minutes of time spent on discharge, including examining the patient, providing discharge instructions, arranging follow-up and documentation.            Fanny Meyers MD  Department of Hospital Medicine  [facility] - Surgery

## 2024-08-25 NOTE — ASSESSMENT & PLAN NOTE
Patient admitted with left hip pain and found on imaging to have large lytic lesion to left proximal femur. Ortho consulted and recommended prophylactic IM nail to left femur to prevent impending fracture. Patient with known NSCLC and presumed metastatic lesion from her lung cancer to bone.  - Ortho consulted and patient taken to OR and underwent IM nail to left femur on 8/20/2024 by Dr. Porter Campos.   - Discussed with patient's Oncologist, Dr. Avina and aware of admit, plan for immunotherapy as outpatient for treatment of her cancer.   - Patient placed on Apixiban 2.5 mg po BID post-op for DVT prophylaxis with end date 9/22/2024 and discharged on this medication.   - Patient weight bear as tolerated to left lower extremity post-op and continue on discharge. PT/OT consulted post-op. Patient progressing well and doing well and low intensity and discussed with patient wand patient would like to discharge home with  PT/OT when medically ready. She reports she has DME at home. Patient discharged home in good condition with  PT/OT on 8/23.   - Patient placed on multimodal pain meds and bowel regimen post-op. Pain well controlled and continue multimodal pain meds on discharge.   - Surgical bandages to remain in place post-op to left hip until Orthopedic clinic follow-up.   - Biopsy/pathology from left femur pending at time of discharge.

## 2024-08-25 NOTE — ASSESSMENT & PLAN NOTE
Controlled on discharge. Anemia is likely due to acute blood loss which was from surgery and expected blood loss . Most recent hemoglobin and hematocrit are listed below.  Recent Labs     08/22/24  1412 08/23/24  0549   HGB 7.8* 8.1*   HCT 26.4* 27.3*       Plan  - Transfuse PRBC if patient becomes hemodynamically unstable, symptomatic or H/H drops below 7/21.  - Patient has not received any PRBC transfusions to date.  - Patient's anemia is currently improving on discharge.

## 2024-08-25 NOTE — ASSESSMENT & PLAN NOTE
Cardiac resynchronization therapy defibrillator (CRT-D) in place   Patient with known NICM and well controlled. Patient with CRT-D defibrillator in place. Patient euvolemic. Continue home Losartan and Toprol XL to treat on discharge.

## 2024-08-26 ENCOUNTER — PATIENT OUTREACH (OUTPATIENT)
Dept: ADMINISTRATIVE | Facility: CLINIC | Age: 69
End: 2024-08-26
Payer: MEDICARE

## 2024-08-26 ENCOUNTER — OFFICE VISIT (OUTPATIENT)
Dept: HEMATOLOGY/ONCOLOGY | Facility: CLINIC | Age: 69
End: 2024-08-26
Payer: MEDICARE

## 2024-08-26 ENCOUNTER — PATIENT MESSAGE (OUTPATIENT)
Dept: ADMINISTRATIVE | Facility: CLINIC | Age: 69
End: 2024-08-26
Payer: MEDICARE

## 2024-08-26 DIAGNOSIS — M89.9 LYTIC BONE LESION OF LEFT FEMUR: ICD-10-CM

## 2024-08-26 DIAGNOSIS — Z95.810 CARDIAC RESYNCHRONIZATION THERAPY DEFIBRILLATOR (CRT-D) IN PLACE: ICD-10-CM

## 2024-08-26 DIAGNOSIS — I42.0 DILATED CARDIOMYOPATHY: ICD-10-CM

## 2024-08-26 DIAGNOSIS — Z85.3 HISTORY OF BREAST CANCER IN FEMALE: ICD-10-CM

## 2024-08-26 DIAGNOSIS — C34.91 NSCLC OF RIGHT LUNG: Primary | ICD-10-CM

## 2024-08-26 DIAGNOSIS — I44.7 LEFT BUNDLE-BRANCH BLOCK: ICD-10-CM

## 2024-08-26 DIAGNOSIS — M25.552 LEFT HIP PAIN: ICD-10-CM

## 2024-08-26 DIAGNOSIS — J70.0 RADIATION PNEUMONITIS: ICD-10-CM

## 2024-08-26 PROCEDURE — 1111F DSCHRG MED/CURRENT MED MERGE: CPT | Mod: HCNC,CPTII,95, | Performed by: NURSE PRACTITIONER

## 2024-08-26 PROCEDURE — 1160F RVW MEDS BY RX/DR IN RCRD: CPT | Mod: HCNC,CPTII,95, | Performed by: NURSE PRACTITIONER

## 2024-08-26 PROCEDURE — 3072F LOW RISK FOR RETINOPATHY: CPT | Mod: HCNC,CPTII,95, | Performed by: NURSE PRACTITIONER

## 2024-08-26 PROCEDURE — 4010F ACE/ARB THERAPY RXD/TAKEN: CPT | Mod: HCNC,CPTII,95, | Performed by: NURSE PRACTITIONER

## 2024-08-26 PROCEDURE — 1159F MED LIST DOCD IN RCRD: CPT | Mod: HCNC,CPTII,95, | Performed by: NURSE PRACTITIONER

## 2024-08-26 PROCEDURE — 99214 OFFICE O/P EST MOD 30 MIN: CPT | Mod: HCNC,95,, | Performed by: NURSE PRACTITIONER

## 2024-08-26 PROCEDURE — 3044F HG A1C LEVEL LT 7.0%: CPT | Mod: HCNC,CPTII,95, | Performed by: NURSE PRACTITIONER

## 2024-08-26 PROCEDURE — 1157F ADVNC CARE PLAN IN RCRD: CPT | Mod: HCNC,CPTII,95, | Performed by: NURSE PRACTITIONER

## 2024-08-26 PROCEDURE — G2211 COMPLEX E/M VISIT ADD ON: HCPCS | Mod: HCNC,95,, | Performed by: NURSE PRACTITIONER

## 2024-08-26 NOTE — PROGRESS NOTES
ONCOLOGY FOLLOW UP VISIT.     The patient location is: home  The chief complaint leading to consultation is: Imaging review for metastatic NSCLC    Visit type: audiovisual    Face to Face time with patient: 15  30 minutes of total time spent on the encounter, which includes face to face time and non-face to face time preparing to see the patient (eg, review of tests), Obtaining and/or reviewing separately obtained history, Documenting clinical information in the electronic or other health record, Independently interpreting results (not separately reported) and communicating results to the patient/family/caregiver, or Care coordination (not separately reported).     Each patient to whom he or she provides medical services by telemedicine is:  (1) informed of the relationship between the physician and patient and the respective role of any other health care provider with respect to management of the patient; and (2) notified that he or she may decline to receive medical services by telemedicine and may withdraw from such care at any time.    Cancer/Stage/TNM:    Cancer Staging   NSCLC of right lung  Staging form: Lung, AJCC 8th Edition  - Clinical stage from 9/29/2020: Stage IIIA (cT3, cN1, cM0) - Signed by Ramo Tucker MD on 10/24/2020  - Clinical stage from 7/31/2024: Stage DEREK (cT3, cN2, cM1a) - Signed by Javi Avina MD on 8/2/2024         Oncology History   NSCLC of right lung   9/29/2020 Cancer Staged    Staging form: Lung, AJCC 8th Edition  - Clinical stage from 9/29/2020: Stage IIIA (cT3, cN1, cM0)     10/14/2020 Initial Diagnosis    NSCLC of right lung     11/17/2020 - 12/30/2020 Radiation Therapy    Treating physician: Harvinder Lara     Site  Technique  Energy  Dose/Fx (Gy)  #Fx  Total Dose (Gy)    RLL Lung  VMAT  6X  2  30 / 30  60         2/6/2023 -  Chemotherapy    Treatment Summary   Plan Name: OP NSCLC NIVOLUMAB 360 MG D1 & D22 WITH IPILIMUMAB 1 MG/KG Q6W PLUS CARBOPLATIN (AUC)  PACLITAXEL Q3W X2 DOSES  Treatment Goal: Control  Status: Active  Start Date: 2/6/2023  End Date: 1/9/2026 (Planned)  Provider: Javi Avina MD  Chemotherapy: CARBOplatin (PARAPLATIN) 525 mg in sodium chloride 0.9% 337.5 mL chemo infusion, 525 mg, Intravenous, Clinic/HOD 1 time, 1 of 1 cycle  Administration: 525 mg (2/6/2023), 490 mg (2/27/2023)  PACLitaxeL (TAXOL) 200 mg/m2 = 396 mg in sodium chloride 0.9% 500 mL chemo infusion, 200 mg/m2 = 396 mg (100 % of original dose 200 mg/m2), Intravenous, Clinic/HOD 1 time, 1 of 1 cycle  Dose modification: 200 mg/m2 (original dose 200 mg/m2, Cycle 1), 200 mg/m2 (original dose 200 mg/m2, Cycle 1)  Administration: 396 mg (2/6/2023), 396 mg (2/27/2023)     6/12/2023 - 6/30/2023 Radiation Therapy    Treating physician: Harvinder Lara    Site Technique Energy Dose/Fx (Gy) #Fx Total Dose (Gy)   RLL Lung RtLung 6X 4 15 / 15 60        7/31/2024 Cancer Staged    Staging form: Lung, AJCC 8th Edition  - Clinical stage from 7/31/2024: Stage DEREK (cT3, cN2, cM1a)          Interval History:   Admitted on 8/18/24 for expansile osteolytic lesion to the left femur. Orthopedics recommended IM nail to left femur for impending fracture. Patient taken to OR on 8/20/2024 with Dr. Porter Campos and underwent IM nail to left femur. Post-op, patient is weight bear as tolerated to left lower extremity. Patient placed on multimodals for pain management. Patient placed on Apixiban 2.5 mg po BID for DVT prophylaxis and will need for 30 days. Discharged with home health PT/OT     Today, patient reports she is doing fairly well. Does have pain from recent surgery.     ROS:  Review of Systems   Constitutional:  Negative for chills, fever and weight loss.   HENT:  Negative for hearing loss, nosebleeds and sore throat.    Eyes:  Negative for blurred vision and double vision.   Respiratory:  Negative for cough, hemoptysis, shortness of breath and wheezing.    Cardiovascular:  Negative for chest  pain and leg swelling.   Gastrointestinal:  Negative for abdominal pain, blood in stool, diarrhea, heartburn, nausea and vomiting.   Genitourinary:  Negative for dysuria, frequency and hematuria.   Musculoskeletal:  Positive for back pain (right sided) and joint pain (L hip pain). Negative for myalgias.        Right shoulder blade pain   Skin:  Negative for itching and rash.   Neurological:  Negative for dizziness, tingling, sensory change, speech change and headaches.   Endo/Heme/Allergies:  Does not bruise/bleed easily.   Psychiatric/Behavioral:  The patient is not nervous/anxious.           A complete 12-point review of systems was reviewed and is negative except as mentioned above.     Past Medical History:   Past Medical History:   Diagnosis Date    AICD (automatic cardioverter/defibrillator) present     Asthma     bronchitis in past    Breast cancer 2016    right    Cardiac pacemaker     Cardiomyopathy     COPD (chronic obstructive pulmonary disease)     Diabetes mellitus, type 2     Hyperglycemia     Hyperlipidemia     Hypertension     Malignant neoplasm of overlapping sites of female breast 2/12/2016    Nuclear sclerosis of both eyes 8/12/2020    Respiratory distress 3/12/2020        Allergies:   Review of patient's allergies indicates:   Allergen Reactions    Taxol [paclitaxel]      Hypersensitivity reaction to taxol, symptoms included shortness of breath, nausea, dizziness, flushing     Carboplatin Other (See Comments)     Itching and hives    Adhesive Rash     tegaderm burns and blistered skin        Medications:   Current Outpatient Medications   Medication Sig Dispense Refill    ACCU-CHEK ALFRED PLUS TEST STRP Strp USE TO TEST THREE TIMES DAILY 200 strip 3    acetaminophen (TYLENOL) 500 MG tablet Take 2 tablets (1,000 mg total) by mouth every 8 (eight) hours as needed for Pain.      albuterol (ACCUNEB) 1.25 mg/3 mL Nebu use 1 AMPULE (3ml) via NEBULIZER EVERY 6 HOURS AS NEEDED 300 mL 3    albuterol  (VENTOLIN HFA) 90 mcg/actuation inhaler Inhale 2 puffs into the lungs every 4 (four) hours as needed for Wheezing or Shortness of Breath. Rescue 18 g 4    amLODIPine (NORVASC) 5 MG tablet TAKE 1 TABLET BY MOUTH EVERY DAY 90 tablet 2    apixaban (ELIQUIS) 2.5 mg Tab Take 1 tablet (2.5 mg total) by mouth 2 (two) times daily. DVT prophylaxis after hip surgery. End date 9/22/2024. 60 tablet 0    atorvastatin (LIPITOR) 10 MG tablet TAKE 1 TABLET BY MOUTH EVERY DAY 90 tablet 1    benzonatate (TESSALON) 200 MG capsule Take 1 capsule (200 mg total) by mouth 3 (three) times daily as needed for Cough. 30 capsule 1    buPROPion (WELLBUTRIN XL) 150 MG TB24 tablet TAKE 1 TABLET BY MOUTH EVERY DAY 90 tablet 3    celecoxib (CELEBREX) 200 MG capsule Take 1 capsule (200 mg total) by mouth once daily. 30 capsule 0    cetirizine (ZYRTEC) 10 MG tablet Take 10 mg by mouth every evening.       gabapentin (NEURONTIN) 300 MG capsule Take 1 capsule (300 mg total) by mouth 3 (three) times daily. 90 capsule 11    losartan (COZAAR) 100 MG tablet TAKE 1 TABLET BY MOUTH EVERY DAY 90 tablet 3    magnesium oxide (MAG-OX) 400 mg (241.3 mg magnesium) tablet Take 1 tablet (400 mg total) by mouth 2 (two) times daily. for 7 days 14 tablet 0    meloxicam (MOBIC) 15 MG tablet Take 1 tablet (15 mg total) by mouth once daily. 30 tablet 0    metFORMIN (GLUCOPHAGE) 500 MG tablet TAKE 2 TABLETS BY MOUTH TWICE A DAY WITH MEALS 360 tablet 3    methocarbamoL (ROBAXIN) 750 MG Tab Take 1 tablet (750 mg total) by mouth 4 (four) times daily. 120 tablet 0    metoprolol succinate (TOPROL-XL) 100 MG 24 hr tablet TAKE 1 TABLET BY MOUTH EVERY DAY 90 tablet 3    multivitamin (THERAGRAN) per tablet Take 1 tablet by mouth once daily.      ondansetron (ZOFRAN-ODT) 8 MG TbDL Take 1 tablet (8 mg total) by mouth every 8 (eight) hours as needed (nausea/vomiting). Take 1 tablet (8 mg) by mouth every 8 hours as needed for nausea/vomiting. 60 tablet 5    oxyCODONE (ROXICODONE) 5 MG  immediate release tablet Take 1 tablet (5 mg total) by mouth every 4 (four) hours as needed for Pain. 30 tablet 0    palonosetron (ALOXI) 0.25 mg/5 mL injection  (Patient not taking: Reported on 8/27/2024)      pantoprazole (PROTONIX) 40 MG tablet TAKE 1 TABLET BY MOUTH EVERY DAY 90 tablet 3    READI-CAT 2 2 % (w/v) suspension  (Patient not taking: Reported on 8/27/2024)      READI-CAT 2 2.1 % (w/v), 2.0 % (w/w) suspension  (Patient not taking: Reported on 8/27/2024)      senna-docusate 8.6-50 mg (PERICOLACE) 8.6-50 mg per tablet Take 1 tablet by mouth once daily. for 14 days 14 tablet 0    sertraline (ZOLOFT) 100 MG tablet TAKE 1 TABLET BY MOUTH EVERY DAY IN THE EVENING 90 tablet 2    tiotropium (SPIRIVA WITH HANDIHALER) 18 mcg inhalation capsule INHALE THE CONTENTS OF 1 CAPSULE BY MOUTH EVERY DAY 90 capsule 3    cholecalciferol, vitamin D3, (VITAMIN D3) 50 mcg (2,000 unit) Tab Take 1 tablet (2,000 Units total) by mouth once daily. (Patient not taking: Reported on 8/27/2024) 30 tablet 11    WIXELA INHUB 250-50 mcg/dose diskus inhaler INHALE 1 PUFF INTO THE LUNGS 2 (TWO) TIMES DAILY. CONTROLLER 180 each 3     No current facility-administered medications for this visit.     Facility-Administered Medications Ordered in Other Visits   Medication Dose Route Frequency Provider Last Rate Last Admin    fentaNYL injection 25 mcg  25 mcg Intravenous Q5 Min PRKeesha Mirza MD        haloperidol lactate injection 0.5 mg  0.5 mg Intravenous Once PRN Keesha Martins MD        HYDROmorphone injection 0.2 mg  0.2 mg Intravenous Q5 Min PRKeesha Mirza MD        ondansetron injection 4 mg  4 mg Intravenous Once PRN Keesha Martins MD        sodium chloride 0.9% flush 10 mL  10 mL Intravenous PRN Keesha Martins MD            Physical Exam:   LMP  (LMP Unknown)      ECOG Performance Status: (foot note - ECOG PS provided by Eastern Cooperative Oncology Group) 0 - Asymptomatic    Physical Exam  Vitals and nursing note reviewed.   Constitutional:        Appearance: Normal appearance. She is well-developed and normal weight.   HENT:      Head: Normocephalic and atraumatic.   Eyes:      Conjunctiva/sclera: Conjunctivae normal.      Pupils: Pupils are equal, round, and reactive to light.   Pulmonary:      Effort: Pulmonary effort is normal. No respiratory distress.   Abdominal:      General: There is no distension.      Palpations: Abdomen is soft.   Musculoskeletal:         General: No swelling. Normal range of motion.      Cervical back: Normal range of motion and neck supple.      Left ankle: Swelling (trace) present.   Lymphadenopathy:      Cervical: No cervical adenopathy.   Skin:     General: Skin is warm and dry.      Findings: No rash.   Neurological:      General: No focal deficit present.      Mental Status: She is alert and oriented to person, place, and time.   Psychiatric:         Mood and Affect: Mood and affect normal.         Behavior: Behavior normal.                 Labs:   No results found for this or any previous visit (from the past 48 hour(s)).             Imaging:   X-Ray Femur 2 View Left  Narrative: EXAMINATION:  XR PELVIS ROUTINE AP; XR FEMUR 2 VIEW LEFT    CLINICAL HISTORY:  post-op;; L hip lytic lesion, s/p fixation surgery;    TECHNIQUE:  AP view of the pelvis was performed.  Four views of the left femur    COMPARISON:  08/19/2024; 08/18/2024.    FINDINGS:  Intramedullary interlocking sade fixation of the femur.  Hardware is intact without evidence of loosening or fracture.  Again demonstrated is a lytic focus along the lateral aspect of the proximal femur.  Orthopedic cement has been placed within the femoral head.  There is soft tissue emphysema consistent with recent surgery.  Impression: Satisfactory postoperative appearance of the left femur with redemonstration of a lytic focus along the lateral proximal aspect of the left femur.    Electronically signed by: Moncho Tam  Date:    08/20/2024  Time:    16:32  X-Ray Pelvis Routine  AP  Narrative: EXAMINATION:  XR PELVIS ROUTINE AP; XR FEMUR 2 VIEW LEFT    CLINICAL HISTORY:  post-op;; L hip lytic lesion, s/p fixation surgery;    TECHNIQUE:  AP view of the pelvis was performed.  Four views of the left femur    COMPARISON:  08/19/2024; 08/18/2024.    FINDINGS:  Intramedullary interlocking sade fixation of the femur.  Hardware is intact without evidence of loosening or fracture.  Again demonstrated is a lytic focus along the lateral aspect of the proximal femur.  Orthopedic cement has been placed within the femoral head.  There is soft tissue emphysema consistent with recent surgery.  Impression: Satisfactory postoperative appearance of the left femur with redemonstration of a lytic focus along the lateral proximal aspect of the left femur.    Electronically signed by: Moncho Tam  Date:    08/20/2024  Time:    16:32  SURG FL Surgery Fluoro Usage  See OP Notes for results.     IMPRESSION: See OP Notes for results.     This procedure was auto-finalized by: Virtual Radiologist            Diagnoses:       1. NSCLC of right lung    2. Left hip pain    3. Lytic bone lesion of left femur s/p IM nail on 8/20/2024    4. Radiation pneumonitis    5. History of breast cancer in female    6. Dilated cardiomyopathy    7. Left bundle-branch block    8. Cardiac resynchronization therapy defibrillator (CRT-D) in place          Assessment and Plan:         1. Recurrent NSCLC  Plan is for definitive chemoRT, will need re-staging scans prior.  Reviewed with patient in detail her diagnosis, stage, treatment options, and prognosis (3 year OS 66% vs. 43.5% without durvalumab) with standard of care chemoRT followed by maintenance immunotherapy.  Patient has consented for [id number] clinical trial, a randomized control trial evaluating combination chemoRT + durvalumab followed by maintenance vs. SOC chemoRT -> maintenance.  CT scans 7/2021 with CR.  - patient randomized on [id number] to SOC arm (Imfinzi) - completed 12/2021.    -  CT scan 12/2022 with progressing pulmonary nodule in LLL, still with stable disease in RLL consolidation. PET scan also showing enlarging right lower lobe mass with increased hypermetabolic activity concerning for recurrence. Will present her case at the next thoracic tumor board to discuss biopsy and possible treatment options.   - Biopsy of lung mass consistent with SCC. Initiated on 9LA. Initial re-staging scans with stable disease.   - Patient has completed RT 4-5 weeks ago and has no symptoms. She has some mild inflammation on Chest CT, but since asymptomatic and completed RT will re-initiate IO.  - Now s/p 3 cycles of 9LA. With persistent IO induced rash and continued pneumonitis on recent imaging, plan to hold IO and proceed with surveillance.  - 10/16/23 CT showing continued evidence of inflammation/infection. Currently has cold symptoms. +Covid.   - Repeat CT scan (preliminary reading) shows improvement in inflammation but indeterminate possible new liver metastases. Will confirm with PETCT.  - CT CAP is showing progression of disease in L hilum, hilar LN, mediastinal LN, and pleural effusion.   - Plan is to restart ipi + nivo next available. Will double check eligibility for any clinical trials at [facility]. Will check on re-authorization. Scheduled for 9/12.      2,3. Recovering from left femur nailing. Has oxy IR for pain. PT/OT ordered.    4. Resolved    5.  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      6-8.  Stable, follows with cardiology. AICD placed, but patient now has recovered EF and only takes lasix prn.  NYHA class I.    Patient is in agreement with the proposed treatment plan. All questions were answered to the patient's satisfaction. Pt knows to call clinic if anything is needed before the next clinic visit.    Aide Magaña, MSN, APRN, FNP-C  Hematology and Medical Oncology  Nurse Practitioner to Dr. Javi Avina  Nurse Practitioner, [facility]        Route Chart for Scheduling    Med Onc Chart Routing      Follow up with physician    Follow up with DANIEL . RTC as scheduled   Infusion scheduling note    Injection scheduling note    Labs    Imaging    Pharmacy appointment    Other referrals              Treatment Plan Information   OP NSCLC NIVOLUMAB 360 MG D1 & D22 WITH IPILIMUMAB 1 MG/KG Q6W PLUS CARBOPLATIN (AUC) PACLITAXEL Q3W X2 DOSES Javi Avina MD   Associated diagnosis: Lung mass   noted on 3/12/2020  Associated diagnosis: NSCLC of right lung Stage IIIA, Stage DEREK cT3, cN1, cM0, cT3, cN2, cM1a noted on 10/14/2020   Line of treatment: First Line  Treatment Goal: Control     Upcoming Treatment Dates - OP NSCLC NIVOLUMAB 360 MG D1 & D22 WITH IPILIMUMAB 1 MG/KG Q6W PLUS CARBOPLATIN (AUC) PACLITAXEL Q3W X2 DOSES    8/2/2024       Immunotherapy       nivolumab 360 mg in sodium chloride 0.9% 86 mL infusion       ipilimumab (YERVOY) 1 mg/kg = 81 mg in sodium chloride 0.9% 66.2 mL chemo infusion  8/23/2024       Immunotherapy       nivolumab 360 mg in sodium chloride 0.9% 86 mL infusion  9/13/2024       Immunotherapy       nivolumab 360 mg in sodium chloride 0.9% 86 mL infusion       ipilimumab (YERVOY) 1 mg/kg = 81 mg in sodium chloride 0.9% 66.2 mL chemo infusion  10/4/2024       Immunotherapy       nivolumab 360 mg in sodium chloride 0.9% 86 mL infusion

## 2024-08-26 NOTE — PROGRESS NOTES
C3 nurse attempted to contact Hannah Montoya for a TCC post hospital discharge follow up call. No answer. Left voicemail with callback information. The patient has a scheduled HOSFU appointment with Emanuel Chavez On 09/09/24 @ [id number].

## 2024-08-27 LAB
OHS CV AF BURDEN PERCENT: < 1
OHS CV DC REMOTE DEVICE TYPE: NORMAL
OHS CV ICD SHOCK: NO
OHS CV RV PACING PERCENT: 99.7 %

## 2024-08-27 NOTE — PROGRESS NOTES
C3 nurse spoke with Hannah Montoya for a TCC post hospital discharge follow up call. The patient has a scheduled HOSFU appointment with Emanuel Chavez On 09/09/24 @ [phone].

## 2024-08-28 ENCOUNTER — TUMOR BOARD CONFERENCE (OUTPATIENT)
Dept: HEMATOLOGY/ONCOLOGY | Facility: CLINIC | Age: 69
End: 2024-08-28
Payer: MEDICARE

## 2024-08-28 NOTE — PROGRESS NOTES
[facility] Tumor Board    Date: 8/28/2024    Patient Name: Hannah Montoya    MRN: [id number]    Diagnosis: Stage IV SCC of the lung - Diagnosed in 9/2020 as stage IIIA. Completed chemoRT in 12/2020. Patient randomized on [id number] to SOC arm (Imfinzi) - completed 12/2021.  CT scan 12/2022 with progressing pulmonary nodule in LLL.    Referring Provider: Dr. Avina    Present PCTP Providers:     Dr. Javi Avina, Candace Razo NP, Aide Magaña NP, Dr. Shemar Duffy    Patient Summary:  Pathology:    Has failed Immunotherapy  Failed Immunotherapy: 1. 9LA (2/2023-8/2023) s/p 3 cycles of 9LA. Had persistent IO induced rash and continued pneumonitis on imaging. Held IO and proceeded with surveillance. CT CAP (7/31) is showing progression of disease in L hilum, hilar LN, mediastinal LN, and pleural effusion. expansile osteolytic lesion of left femur - s/p IM nailing of femur on 8/20.    Current treatment(s):    ECOG: Restricted in physically strenuous activity but ambulatory and able to carry out work of a light or sedentary nature, e.g., light house work, office work    Molecular Workup:    Other  Other Molecular Workup: Tempus (1/2023): LRP1B, MYC, PTCH1, TP53      Board Recommendations:    Standard of care recommendations: Opdivo + Yervoy   Trial recommendations: Late phase: DNQ for EDGE - already received Durva and treated for PD.  Early phase: No.  MDA referral: NoJavid

## 2024-08-29 LAB
FINAL PATHOLOGIC DIAGNOSIS: NORMAL
GROSS: NORMAL
Lab: NORMAL
MICROSCOPIC EXAM: NORMAL

## 2024-09-03 ENCOUNTER — OFFICE VISIT (OUTPATIENT)
Dept: ORTHOPEDICS | Facility: CLINIC | Age: 69
End: 2024-09-03
Payer: MEDICARE

## 2024-09-03 ENCOUNTER — HOSPITAL ENCOUNTER (OUTPATIENT)
Dept: RADIOLOGY | Facility: HOSPITAL | Age: 69
Discharge: HOME | End: 2024-09-03
Attending: NURSE PRACTITIONER
Payer: MEDICARE

## 2024-09-03 VITALS
WEIGHT: 204.56 LBS | SYSTOLIC BLOOD PRESSURE: 133 MMHG | BODY MASS INDEX: 37.64 KG/M2 | DIASTOLIC BLOOD PRESSURE: 73 MMHG | HEART RATE: 87 BPM | TEMPERATURE: 97 F | HEIGHT: 62 IN

## 2024-09-03 DIAGNOSIS — M84.452D: Primary | ICD-10-CM

## 2024-09-03 DIAGNOSIS — R52 PAIN: Primary | ICD-10-CM

## 2024-09-03 DIAGNOSIS — R52 PAIN: ICD-10-CM

## 2024-09-03 DIAGNOSIS — Z98.890 POST-OPERATIVE STATE: ICD-10-CM

## 2024-09-03 PROCEDURE — 3078F DIAST BP <80 MM HG: CPT | Mod: HCNC,CPTII,S$GLB, | Performed by: NURSE PRACTITIONER

## 2024-09-03 PROCEDURE — 73552 X-RAY EXAM OF FEMUR 2/>: CPT | Mod: TC,HCNC,LT

## 2024-09-03 PROCEDURE — 99024 POSTOP FOLLOW-UP VISIT: CPT | Mod: HCNC,S$GLB,, | Performed by: NURSE PRACTITIONER

## 2024-09-03 PROCEDURE — 99999 PR PBB SHADOW E&M-EST. PATIENT-LVL V: CPT | Mod: PBBFAC,HCNC,, | Performed by: NURSE PRACTITIONER

## 2024-09-03 PROCEDURE — 3075F SYST BP GE 130 - 139MM HG: CPT | Mod: HCNC,CPTII,S$GLB, | Performed by: NURSE PRACTITIONER

## 2024-09-03 PROCEDURE — 1125F AMNT PAIN NOTED PAIN PRSNT: CPT | Mod: HCNC,CPTII,S$GLB, | Performed by: NURSE PRACTITIONER

## 2024-09-03 PROCEDURE — 1157F ADVNC CARE PLAN IN RCRD: CPT | Mod: HCNC,CPTII,S$GLB, | Performed by: NURSE PRACTITIONER

## 2024-09-03 PROCEDURE — 3072F LOW RISK FOR RETINOPATHY: CPT | Mod: HCNC,CPTII,S$GLB, | Performed by: NURSE PRACTITIONER

## 2024-09-03 PROCEDURE — 3044F HG A1C LEVEL LT 7.0%: CPT | Mod: HCNC,CPTII,S$GLB, | Performed by: NURSE PRACTITIONER

## 2024-09-03 PROCEDURE — 3288F FALL RISK ASSESSMENT DOCD: CPT | Mod: HCNC,CPTII,S$GLB, | Performed by: NURSE PRACTITIONER

## 2024-09-03 PROCEDURE — 73552 X-RAY EXAM OF FEMUR 2/>: CPT | Mod: 26,HCNC,LT, | Performed by: RADIOLOGY

## 2024-09-03 PROCEDURE — 1160F RVW MEDS BY RX/DR IN RCRD: CPT | Mod: HCNC,CPTII,S$GLB, | Performed by: NURSE PRACTITIONER

## 2024-09-03 PROCEDURE — 1101F PT FALLS ASSESS-DOCD LE1/YR: CPT | Mod: HCNC,CPTII,S$GLB, | Performed by: NURSE PRACTITIONER

## 2024-09-03 PROCEDURE — 1159F MED LIST DOCD IN RCRD: CPT | Mod: HCNC,CPTII,S$GLB, | Performed by: NURSE PRACTITIONER

## 2024-09-03 PROCEDURE — 72170 X-RAY EXAM OF PELVIS: CPT | Mod: 26,HCNC,, | Performed by: RADIOLOGY

## 2024-09-03 PROCEDURE — 72170 X-RAY EXAM OF PELVIS: CPT | Mod: TC,HCNC

## 2024-09-03 PROCEDURE — 4010F ACE/ARB THERAPY RXD/TAKEN: CPT | Mod: HCNC,CPTII,S$GLB, | Performed by: NURSE PRACTITIONER

## 2024-09-03 RX ORDER — OXYCODONE HYDROCHLORIDE 5 MG/1
5 TABLET ORAL EVERY 6 HOURS PRN
Qty: 28 TABLET | Refills: 0 | Status: SHIPPED | OUTPATIENT
Start: 2024-09-03

## 2024-09-03 NOTE — PROGRESS NOTES
Ms. Montoya is here today for a post-operative visit after undergoing the following:    Open reduction internal fixation left intertrochanteric hip fracture with intramedullary nail  Open bone biopsy left proximal femur     by Dr. Campos on 2024.    Interval History:  She reports that she is doing ok.  She states their pain is tolerable.  She is taking pain medication.  She is getting home health therapy.  She states she has been using a walker most of the time.  She has tried to use a cane for the past day.  She does endorse some intermittent pain to the left lateral hip.  She denies any falls or injuries since surgery/last seen in the clinic.  She denies fever, chills, and sweats since the time of the surgery.     Physical exam:  The patient's dressing was taken down.  Incision is clean, dry and intact.  No signs of infection noted.  Skin was closed with Dermabond and Stratifix ends were clipped.  She has tactile stimulation to their lower leg, she denies calf pain, there is no leg edema and their pedal pulse is palpable x 2.  She is able to stand unassisted.  Range of motion of the hip is 0-90 degrees.  No pain with axial loading or with log roll.    RADS:  Pelvis x-ray and left femur x-ray was obtained.  Findings show IM nailing remained stable.  Patient has cement and the femoral head.  Fracture remains stable.  No evidence of hardware failure or new fracture seen.    Assessment:  Post-op visit (2 weeks)    Path report shows followin. AND 2.  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 Pathological fracture of intertrochanteric section of left femur with routine healing, subsequent encounter s/p IM nailing on 24  M84.452D V54.25   2. Post-operative state  Z98.890 V45.89     Current care, treatment plan, precautions, activity level/ modifications, limitations, rehabilitation exercises and  proposed future treatment were discussed with the patient. We discussed the need to monitor for changes in symptoms and condition and report them to the physician.  Discussed importance of compliance with all appointments and follow up examinations.     WOUND CARE ORDERS  -Hannah was advised to keep the incision clean and dry for the next 24 hours after which she may wash the area with antibacterial soap in the shower.   -She not submerge until the incision is completely healed  -Patient was advised to monitor wound closely and multiple times daily for any problems. Call clinic immediately or report to ED for immediate medical attention for any complications including reopening of wound, drainage, purulence, redness, streaking, odor, pain out of proportion, fever, chills, etc.   - If there are signs of infection, please call Ortho Clinic [phone] for further instructions and to make appt to be seen.       PHYSICAL THERAPY:   - Physical therapy as ordered.  Patient is currently getting home health.  - Weight bearing as tolerated but recommend use of a walker for life  - Range of motion as tolerated.      PAIN MEDICATION:   - Pain medication: refill was needed, I will refill her OxyIR  - Pain medication refill policy provided to patient for review, yes.    - Patient was informed a multi-modal approach is used to treat their pain.     DVT PROPHYLAXIS:   - Eliquis 2.5 mg bid    FRAGILITY:  - Patient has been referred to the fracture clinic.     FOLLOW UP:   - Patient will follow up in the clinic in 4 weeks.  - X-ray of her Pelvis and left femur is needed.    - Future Appointments:   Future Appointments   Date Time Provider Department Center   9/9/2024  8:20 AM [facility]   9/11/2024  9:30 AM LAB, [facility] LAB [facility]   9/12/2024  9:00 AM Aide Magaña NP [facility] HEMONC3 Jose [place]   9/12/2024 10:00 AM CHAIR 4, [facility] BMT INF [facility] B[facility]F [facility]   9/30/2024  9:30 AM  LAB, [facility] LAB [facility]   10/1/2024  9:30 AM Aide Magaña, [facility] NOM HEMONC3 Garcia Cance   10/1/2024 10:00 AM NURSE 13, [facility] CHEMO NOM CHEMO Jose Canalesia   10/2/2024 10:30 AM Corey Ceron NP NOM ORTHO [facility]   10/3/2024 10:05 AM LAB, [facility]   10/7/2024  1:00 PM [facility] MAMMO1 LAP MAMMO [place]   10/10/2024 11:00 AM [facility]   10/24/2024 10:30 AM Julianne Juarez OD [facility] OPTOMTY [place]           If there are any questions prior to scheduled follow up, the patient was instructed to contact the office

## 2024-09-08 RX ORDER — MELOXICAM 7.5 MG/1
7.5 TABLET ORAL DAILY
Qty: 30 TABLET | Refills: 1 | Status: SHIPPED | OUTPATIENT
Start: 2024-09-08

## 2024-09-10 ENCOUNTER — LAB VISIT (OUTPATIENT)
Dept: LAB | Facility: HOSPITAL | Age: 69
End: 2024-09-10
Attending: INTERNAL MEDICINE
Payer: MEDICARE

## 2024-09-10 DIAGNOSIS — C34.91 NSCLC OF RIGHT LUNG: ICD-10-CM

## 2024-09-10 DIAGNOSIS — R53.83 FATIGUE, UNSPECIFIED TYPE: ICD-10-CM

## 2024-09-10 LAB
ALBUMIN SERPL BCP-MCNC: 3.2 G/DL (ref 3.5–5.2)
ALP SERPL-CCNC: 95 U/L (ref 55–135)
ALT SERPL W/O P-5'-P-CCNC: 8 U/L (ref 10–44)
ANION GAP SERPL CALC-SCNC: 13 MMOL/L (ref 8–16)
AST SERPL-CCNC: 14 U/L (ref 10–40)
BILIRUB SERPL-MCNC: 0.3 MG/DL (ref 0.1–1)
BUN SERPL-MCNC: 19 MG/DL (ref 8–23)
CALCIUM SERPL-MCNC: 11.4 MG/DL (ref 8.7–10.5)
CHLORIDE SERPL-SCNC: 105 MMOL/L (ref 95–110)
CO2 SERPL-SCNC: 25 MMOL/L (ref 23–29)
CREAT SERPL-MCNC: 0.9 MG/DL (ref 0.5–1.4)
ERYTHROCYTE [DISTWIDTH] IN BLOOD BY AUTOMATED COUNT: 19.8 % (ref 11.5–14.5)
EST. GFR  (NO RACE VARIABLE): >60 ML/MIN/1.73 M^2
GLUCOSE SERPL-MCNC: 156 MG/DL (ref 70–110)
HCT VFR BLD AUTO: 33.4 % (ref 37–48.5)
HGB BLD-MCNC: 9.7 G/DL (ref 12–16)
IMM GRANULOCYTES # BLD AUTO: 0.04 K/UL (ref 0–0.04)
MCH RBC QN AUTO: 23.2 PG (ref 27–31)
MCHC RBC AUTO-ENTMCNC: 29 G/DL (ref 32–36)
MCV RBC AUTO: 80 FL (ref 82–98)
NEUTROPHILS # BLD AUTO: 4.3 K/UL (ref 1.8–7.7)
PLATELET # BLD AUTO: 307 K/UL (ref 150–450)
PMV BLD AUTO: 10.5 FL (ref 9.2–12.9)
POTASSIUM SERPL-SCNC: 4.8 MMOL/L (ref 3.5–5.1)
PROT SERPL-MCNC: 8 G/DL (ref 6–8.4)
RBC # BLD AUTO: 4.18 M/UL (ref 4–5.4)
SODIUM SERPL-SCNC: 143 MMOL/L (ref 136–145)
T4 FREE SERPL-MCNC: 1.03 NG/DL (ref 0.71–1.51)
TSH SERPL DL<=0.005 MIU/L-ACNC: 1.52 UIU/ML (ref 0.4–4)
WBC # BLD AUTO: 5.48 K/UL (ref 3.9–12.7)

## 2024-09-10 PROCEDURE — 84439 ASSAY OF FREE THYROXINE: CPT | Mod: HCNC | Performed by: INTERNAL MEDICINE

## 2024-09-10 PROCEDURE — 85027 COMPLETE CBC AUTOMATED: CPT | Mod: HCNC | Performed by: INTERNAL MEDICINE

## 2024-09-10 PROCEDURE — 80053 COMPREHEN METABOLIC PANEL: CPT | Mod: HCNC | Performed by: INTERNAL MEDICINE

## 2024-09-10 PROCEDURE — 36415 COLL VENOUS BLD VENIPUNCTURE: CPT | Mod: HCNC | Performed by: INTERNAL MEDICINE

## 2024-09-10 PROCEDURE — 84443 ASSAY THYROID STIM HORMONE: CPT | Mod: HCNC | Performed by: INTERNAL MEDICINE

## 2024-09-12 ENCOUNTER — INFUSION (OUTPATIENT)
Dept: INFUSION THERAPY | Facility: HOSPITAL | Age: 69
End: 2024-09-12
Payer: MEDICARE

## 2024-09-12 ENCOUNTER — OFFICE VISIT (OUTPATIENT)
Dept: HEMATOLOGY/ONCOLOGY | Facility: CLINIC | Age: 69
End: 2024-09-12
Payer: MEDICARE

## 2024-09-12 VITALS
RESPIRATION RATE: 16 BRPM | HEIGHT: 62 IN | TEMPERATURE: 99 F | OXYGEN SATURATION: 96 % | BODY MASS INDEX: 37.42 KG/M2 | SYSTOLIC BLOOD PRESSURE: 141 MMHG | HEART RATE: 95 BPM | DIASTOLIC BLOOD PRESSURE: 65 MMHG

## 2024-09-12 VITALS
TEMPERATURE: 98 F | WEIGHT: 181.31 LBS | BODY MASS INDEX: 33.17 KG/M2 | SYSTOLIC BLOOD PRESSURE: 160 MMHG | HEART RATE: 79 BPM | DIASTOLIC BLOOD PRESSURE: 68 MMHG

## 2024-09-12 DIAGNOSIS — I44.7 LEFT BUNDLE-BRANCH BLOCK: ICD-10-CM

## 2024-09-12 DIAGNOSIS — C79.51 MALIGNANT NEOPLASM METASTATIC TO BONE: ICD-10-CM

## 2024-09-12 DIAGNOSIS — M89.9 LYTIC BONE LESION OF LEFT FEMUR: ICD-10-CM

## 2024-09-12 DIAGNOSIS — C34.91 NSCLC OF RIGHT LUNG: Primary | ICD-10-CM

## 2024-09-12 DIAGNOSIS — L30.4 INTERTRIGO: ICD-10-CM

## 2024-09-12 DIAGNOSIS — J70.0 RADIATION PNEUMONITIS: ICD-10-CM

## 2024-09-12 DIAGNOSIS — C34.91 NSCLC OF RIGHT LUNG: ICD-10-CM

## 2024-09-12 DIAGNOSIS — M25.552 LEFT HIP PAIN: ICD-10-CM

## 2024-09-12 DIAGNOSIS — Z85.3 HISTORY OF BREAST CANCER IN FEMALE: ICD-10-CM

## 2024-09-12 DIAGNOSIS — Z95.810 CARDIAC RESYNCHRONIZATION THERAPY DEFIBRILLATOR (CRT-D) IN PLACE: ICD-10-CM

## 2024-09-12 DIAGNOSIS — R91.8 LUNG MASS: Primary | ICD-10-CM

## 2024-09-12 DIAGNOSIS — E83.52 MALIGNANCY ASSOCIATED HYPERCALCEMIA: ICD-10-CM

## 2024-09-12 DIAGNOSIS — I42.0 DILATED CARDIOMYOPATHY: ICD-10-CM

## 2024-09-12 PROCEDURE — 25000003 PHARM REV CODE 250: Mod: HCNC | Performed by: NURSE PRACTITIONER

## 2024-09-12 PROCEDURE — 99999 PR PBB SHADOW E&M-EST. PATIENT-LVL V: CPT | Mod: PBBFAC,HCNC,, | Performed by: NURSE PRACTITIONER

## 2024-09-12 PROCEDURE — 63600175 PHARM REV CODE 636 W HCPCS: Mod: JW,JG,HCNC | Performed by: NURSE PRACTITIONER

## 2024-09-12 PROCEDURE — 96417 CHEMO IV INFUS EACH ADDL SEQ: CPT | Mod: HCNC

## 2024-09-12 PROCEDURE — 96361 HYDRATE IV INFUSION ADD-ON: CPT

## 2024-09-12 PROCEDURE — 96413 CHEMO IV INFUSION 1 HR: CPT | Mod: HCNC

## 2024-09-12 RX ORDER — EPINEPHRINE 0.3 MG/.3ML
0.3 INJECTION SUBCUTANEOUS ONCE AS NEEDED
Status: DISCONTINUED | OUTPATIENT
Start: 2024-09-12 | End: 2024-09-12 | Stop reason: HOSPADM

## 2024-09-12 RX ORDER — DIPHENHYDRAMINE HYDROCHLORIDE 50 MG/ML
50 INJECTION INTRAMUSCULAR; INTRAVENOUS ONCE AS NEEDED
Status: DISCONTINUED | OUTPATIENT
Start: 2024-09-12 | End: 2024-09-12 | Stop reason: HOSPADM

## 2024-09-12 RX ORDER — HEPARIN 100 UNIT/ML
500 SYRINGE INTRAVENOUS
Status: DISCONTINUED | OUTPATIENT
Start: 2024-09-12 | End: 2024-09-12 | Stop reason: HOSPADM

## 2024-09-12 RX ORDER — NYSTATIN 100000 [USP'U]/G
POWDER TOPICAL 2 TIMES DAILY
Qty: 60 G | Refills: 1 | Status: SHIPPED | OUTPATIENT
Start: 2024-09-12 | End: 2024-10-12

## 2024-09-12 RX ORDER — SODIUM CHLORIDE 0.9 % (FLUSH) 0.9 %
10 SYRINGE (ML) INJECTION
Status: DISCONTINUED | OUTPATIENT
Start: 2024-09-12 | End: 2024-09-12 | Stop reason: HOSPADM

## 2024-09-12 RX ORDER — HEPARIN 100 UNIT/ML
500 SYRINGE INTRAVENOUS
Status: CANCELLED | OUTPATIENT
Start: 2024-09-12

## 2024-09-12 RX ORDER — DIPHENHYDRAMINE HYDROCHLORIDE 50 MG/ML
50 INJECTION INTRAMUSCULAR; INTRAVENOUS ONCE AS NEEDED
Status: CANCELLED | OUTPATIENT
Start: 2024-09-12

## 2024-09-12 RX ORDER — EPINEPHRINE 0.3 MG/.3ML
0.3 INJECTION SUBCUTANEOUS ONCE AS NEEDED
Status: CANCELLED | OUTPATIENT
Start: 2024-09-12

## 2024-09-12 RX ORDER — SODIUM CHLORIDE 0.9 % (FLUSH) 0.9 %
10 SYRINGE (ML) INJECTION
Status: CANCELLED | OUTPATIENT
Start: 2024-09-12

## 2024-09-12 RX ADMIN — SODIUM CHLORIDE: 9 INJECTION, SOLUTION INTRAVENOUS at 10:09

## 2024-09-12 RX ADMIN — SODIUM CHLORIDE 1000 ML: 9 INJECTION, SOLUTION INTRAVENOUS at 10:09

## 2024-09-12 RX ADMIN — SODIUM CHLORIDE 360 MG: 9 INJECTION, SOLUTION INTRAVENOUS at 11:09

## 2024-09-12 RX ADMIN — SODIUM CHLORIDE 81 MG: 9 INJECTION, SOLUTION INTRAVENOUS at 11:09

## 2024-09-12 NOTE — PLAN OF CARE
Problem: Chemotherapy Effects  Goal: Anemia Symptom Improvement  Outcome: Progressing  Goal: Safety Maintained  Outcome: Progressing  Goal: Absence of Hematuria  Outcome: Progressing  Goal: Nausea and Vomiting Relief  Outcome: Progressing  Goal: Neurotoxicity Symptom Control  Outcome: Progressing  Goal: Absence of Infection  Outcome: Progressing  Goal: Absence of Bleeding  Outcome: Progressing     Problem: Fatigue  Goal: Improved Activity Tolerance  Outcome: Progressing     Problem: Anemia  Goal: Anemia Symptom Improvement  Outcome: Progressing     Problem: Infection  Goal: Absence of Infection Signs and Symptoms  Outcome: Progressing   Pt completed C5 opdivo/yervoy with 1L NS bolus via PIV without adverse effects. VS WNL discharged AAOX4 in W/C with daughter as escort

## 2024-09-12 NOTE — PROGRESS NOTES
ONCOLOGY FOLLOW UP VISIT.       Cancer/Stage/TNM:    Cancer Staging   NSCLC of right lung  Staging form: Lung, AJCC 8th Edition  - Clinical stage from 9/29/2020: Stage IIIA (cT3, cN1, cM0) - Signed by Ramo Tucker MD on 10/24/2020  - Clinical stage from 7/31/2024: Stage DEREK (cT3, cN2, cM1a) - Signed by Javi Avina MD on 8/2/2024         Oncology History   NSCLC of right lung   9/29/2020 Cancer Staged    Staging form: Lung, AJCC 8th Edition  - Clinical stage from 9/29/2020: Stage IIIA (cT3, cN1, cM0)     10/14/2020 Initial Diagnosis    NSCLC of right lung     11/17/2020 - 12/30/2020 Radiation Therapy    Treating physician: Harvinder Lara     Site  Technique  Energy  Dose/Fx (Gy)  #Fx  Total Dose (Gy)    RLL Lung  VMAT  6X  2  30 / 30  60         2/6/2023 -  Chemotherapy    Treatment Summary   Plan Name: OP NSCLC NIVOLUMAB 360 MG D1 & D22 WITH IPILIMUMAB 1 MG/KG Q6W PLUS CARBOPLATIN (AUC) PACLITAXEL Q3W X2 DOSES  Treatment Goal: Control  Status: Active  Start Date: 2/6/2023  End Date: 2/19/2026 (Planned)  Provider: Javi Avina MD  Chemotherapy: CARBOplatin (PARAPLATIN) 525 mg in sodium chloride 0.9% 337.5 mL chemo infusion, 525 mg, Intravenous, Clinic/HOD 1 time, 1 of 1 cycle  Administration: 525 mg (2/6/2023), 490 mg (2/27/2023)  PACLitaxeL (TAXOL) 200 mg/m2 = 396 mg in sodium chloride 0.9% 500 mL chemo infusion, 200 mg/m2 = 396 mg (100 % of original dose 200 mg/m2), Intravenous, Clinic/HOD 1 time, 1 of 1 cycle  Dose modification: 200 mg/m2 (original dose 200 mg/m2, Cycle 1), 200 mg/m2 (original dose 200 mg/m2, Cycle 1)  Administration: 396 mg (2/6/2023), 396 mg (2/27/2023)     6/12/2023 - 6/30/2023 Radiation Therapy    Treating physician: Harvinder Vásquez Technique Energy Dose/Fx (Gy) #Fx Total Dose (Gy)   RLL Lung RtLung 6X 4 15 / 15 60        7/31/2024 Cancer Staged    Staging form: Lung, AJCC 8th Edition  - Clinical stage from 7/31/2024: Stage DEREK (cT3, cN2, cM1a)          Interval  History:   Patient reports she is recovering slowly. Was told she will need a walker for ambulation indefinitely. Still working with PT. Notes rash under her left breast that is moist and itchy. Has tried cleansing and drying area completely without resolution. Using irving's butt paste. Has been present for 2 weeks.       ROS:  Review of Systems   Constitutional:  Negative for chills, fever and weight loss.   HENT:  Negative for hearing loss, nosebleeds and sore throat.    Eyes:  Negative for blurred vision and double vision.   Respiratory:  Negative for cough, hemoptysis, shortness of breath and wheezing.    Cardiovascular:  Negative for chest pain and leg swelling.   Gastrointestinal:  Negative for abdominal pain, blood in stool, diarrhea, heartburn, nausea and vomiting.   Genitourinary:  Negative for dysuria, frequency and hematuria.   Musculoskeletal:  Positive for back pain (right sided) and joint pain (L hip pain). Negative for myalgias.   Skin:  Positive for rash (under left breast). Negative for itching.   Neurological:  Positive for weakness. Negative for dizziness, tingling, sensory change, speech change and headaches.   Endo/Heme/Allergies:  Does not bruise/bleed easily.   Psychiatric/Behavioral:  The patient is not nervous/anxious.           A complete 12-point review of systems was reviewed and is negative except as mentioned above.     Past Medical History:   Past Medical History:   Diagnosis Date    AICD (automatic cardioverter/defibrillator) present     Asthma     bronchitis in past    Breast cancer 2016    right    Cardiac pacemaker     Cardiomyopathy     COPD (chronic obstructive pulmonary disease)     Diabetes mellitus, type 2     Hyperglycemia     Hyperlipidemia     Hypertension     Malignant neoplasm of overlapping sites of female breast 2/12/2016    Nuclear sclerosis of both eyes 8/12/2020    Respiratory distress 3/12/2020        Allergies:   Review of patient's allergies indicates:    Allergen Reactions    Taxol [paclitaxel]      Hypersensitivity reaction to taxol, symptoms included shortness of breath, nausea, dizziness, flushing     Carboplatin Other (See Comments)     Itching and hives    Adhesive Rash     tegaderm burns and blistered skin        Medications:   Current Outpatient Medications   Medication Sig Dispense Refill    acetaminophen (TYLENOL) 500 MG tablet Take 2 tablets (1,000 mg total) by mouth every 8 (eight) hours as needed for Pain.      albuterol (VENTOLIN HFA) 90 mcg/actuation inhaler Inhale 2 puffs into the lungs every 4 (four) hours as needed for Wheezing or Shortness of Breath. Rescue 18 g 4    amLODIPine (NORVASC) 5 MG tablet TAKE 1 TABLET BY MOUTH EVERY DAY 90 tablet 2    apixaban (ELIQUIS) 2.5 mg Tab Take 1 tablet (2.5 mg total) by mouth 2 (two) times daily. DVT prophylaxis after hip surgery. End date 9/22/2024. 60 tablet 0    atorvastatin (LIPITOR) 10 MG tablet TAKE 1 TABLET BY MOUTH EVERY DAY 90 tablet 1    benzonatate (TESSALON) 200 MG capsule Take 1 capsule (200 mg total) by mouth 3 (three) times daily as needed for Cough. 30 capsule 1    buPROPion (WELLBUTRIN XL) 150 MG TB24 tablet TAKE 1 TABLET BY MOUTH EVERY DAY 90 tablet 3    cetirizine (ZYRTEC) 10 MG tablet Take 10 mg by mouth every evening.       cholecalciferol, vitamin D3, (VITAMIN D3) 50 mcg (2,000 unit) Tab Take 1 tablet (2,000 Units total) by mouth once daily. 30 tablet 11    gabapentin (NEURONTIN) 300 MG capsule Take 1 capsule (300 mg total) by mouth 3 (three) times daily. 90 capsule 11    losartan (COZAAR) 100 MG tablet TAKE 1 TABLET BY MOUTH EVERY DAY 90 tablet 3    meloxicam (MOBIC) 7.5 MG tablet Take 1 tablet (7.5 mg total) by mouth once daily. 30 tablet 1    metFORMIN (GLUCOPHAGE) 500 MG tablet TAKE 2 TABLETS BY MOUTH TWICE A DAY WITH MEALS 360 tablet 3    methocarbamoL (ROBAXIN) 750 MG Tab Take 1 tablet (750 mg total) by mouth 4 (four) times daily. 120 tablet 0    metoprolol succinate (TOPROL-XL)  100 MG 24 hr tablet TAKE 1 TABLET BY MOUTH EVERY DAY 90 tablet 3    oxyCODONE (ROXICODONE) 5 MG immediate release tablet Take 1 tablet (5 mg total) by mouth every 6 (six) hours as needed for Pain. 28 tablet 0    pantoprazole (PROTONIX) 40 MG tablet TAKE 1 TABLET BY MOUTH EVERY DAY 90 tablet 3    sertraline (ZOLOFT) 100 MG tablet TAKE 1 TABLET BY MOUTH EVERY DAY IN THE EVENING 90 tablet 2    tiotropium (SPIRIVA WITH HANDIHALER) 18 mcg inhalation capsule INHALE THE CONTENTS OF 1 CAPSULE BY MOUTH EVERY DAY 90 capsule 3    WIXELA INHUB 250-50 mcg/dose diskus inhaler INHALE 1 PUFF INTO THE LUNGS 2 (TWO) TIMES DAILY. CONTROLLER 180 each 3    ACCU-CHEK ALFRED PLUS TEST STRP Strp USE TO TEST THREE TIMES DAILY 200 strip 3    albuterol (ACCUNEB) 1.25 mg/3 mL Nebu use 1 AMPULE (3ml) via NEBULIZER EVERY 6 HOURS AS NEEDED (Patient not taking: Reported on 9/12/2024) 300 mL 3    multivitamin (THERAGRAN) per tablet Take 1 tablet by mouth once daily. (Patient not taking: Reported on 9/12/2024)      ondansetron (ZOFRAN-ODT) 8 MG TbDL Take 1 tablet (8 mg total) by mouth every 8 (eight) hours as needed (nausea/vomiting). Take 1 tablet (8 mg) by mouth every 8 hours as needed for nausea/vomiting. (Patient not taking: Reported on 9/12/2024) 60 tablet 5    palonosetron (ALOXI) 0.25 mg/5 mL injection  (Patient not taking: Reported on 9/12/2024)      READI-CAT 2 2 % (w/v) suspension       READI-CAT 2 2.1 % (w/v), 2.0 % (w/w) suspension        No current facility-administered medications for this visit.     Facility-Administered Medications Ordered in Other Visits   Medication Dose Route Frequency Provider Last Rate Last Admin    alteplase injection 2 mg  2 mg Intra-Catheter PRN Aide Magaña, NP        diphenhydrAMINE injection 50 mg  50 mg Intravenous Once PRN Aide Magaña, NP        EPINEPHrine (EPIPEN) 0.3 mg/0.3 mL pen injection 0.3 mg  0.3 mg Intramuscular Once PRN Aide Magaña, NP        fentaNYL injection 25 mcg  25 mcg  "Intravenous Q5 Min PRN Keesha Martins MD        haloperidol lactate injection 0.5 mg  0.5 mg Intravenous Once PRN Keesha Martins MD        heparin, porcine (PF) 100 unit/mL injection flush 500 Units  500 Units Intravenous PRN Aide Magaña NP        hydrocortisone sodium succinate injection 100 mg  100 mg Intravenous Once PRN Aide Magaña, SOCRATES        HYDROmorphone injection 0.2 mg  0.2 mg Intravenous Q5 Min PRN Keesha Martins MD        ipilimumab (YERVOY) 1 mg/kg = 81 mg in 0.9% NaCl 79.2 mL chemo infusion  1 mg/kg (Treatment Plan Recorded) Intravenous 1 time in Clinic/HOD Aide Magaña .4 mL/hr at 09/12/24 1149 81 mg at 09/12/24 1149    ondansetron injection 4 mg  4 mg Intravenous Once PRN Keesha Martins MD        sodium chloride 0.9% flush 10 mL  10 mL Intravenous PRN Keesha Martins MD        sodium chloride 0.9% flush 10 mL  10 mL Intravenous PRN Aide Magaña NP            Physical Exam:   BP (!) 141/65 (BP Location: Left arm, Patient Position: Sitting, BP Method: Medium (Automatic))   Pulse 95   Temp 98.6 °F (37 °C) (Oral)   Resp 16   Ht 5' 2" (1.575 m)   LMP  (LMP Unknown)   SpO2 96%   BMI 37.42 kg/m²      ECOG Performance Status: (foot note - ECOG PS provided by Eastern Cooperative Oncology Group) 0 - Asymptomatic    Physical Exam  Vitals and nursing note reviewed.   Constitutional:       Appearance: Normal appearance. She is well-developed and normal weight.   HENT:      Head: Normocephalic and atraumatic.   Eyes:      Conjunctiva/sclera: Conjunctivae normal.      Pupils: Pupils are equal, round, and reactive to light.   Pulmonary:      Effort: Pulmonary effort is normal. No respiratory distress.   Abdominal:      General: There is no distension.      Palpations: Abdomen is soft.   Musculoskeletal:         General: No swelling. Normal range of motion.      Cervical back: Normal range of motion and neck supple.      Left ankle: Swelling (trace) present.   Lymphadenopathy:      Cervical: No " cervical adenopathy.   Skin:     General: Skin is warm and dry.      Findings: No rash.   Neurological:      General: No focal deficit present.      Mental Status: She is alert and oriented to person, place, and time.   Psychiatric:         Mood and Affect: Mood and affect normal.         Behavior: Behavior normal.               Labs:   No results found for this or any previous visit (from the past 48 hour(s)).               Imaging:   X-Ray Femur 2 View Left  EXAMINATION:  XR FEMUR 2 VIEW LEFT    CLINICAL HISTORY:  Pain, unspecified    FINDINGS:  Femur two views left.    There is intramedullary sade and a femoral neck screw stabilizing proximal femur lesion.  There is good alignment and no complication.  There is DJD.  There is chondrocalcinosis of the knee.    Electronically signed by: Dean Miller MD  Date:    09/03/2024  Time:    15:33  X-Ray Pelvis Routine AP  EXAMINATION:  XR PELVIS ROUTINE AP    CLINICAL HISTORY:  Pain, unspecified    FINDINGS:  Pelvis routine AP one view.    No fracture dislocation seen.  No hardware failure seen.  There is a lytic destructive lesion involving the proximal left femur.  This was seen on the prior study.    Electronically signed by: Dean Miller MD  Date:    09/03/2024  Time:    15:27            Diagnoses:       1. NSCLC of right lung    2. Left hip pain    3. Lytic bone lesion of left femur s/p IM nail on 8/20/2024    4. Radiation pneumonitis    5. History of breast cancer in female    6. Dilated cardiomyopathy    7. Left bundle-branch block    8. Cardiac resynchronization therapy defibrillator (CRT-D) in place    9. Malignant neoplasm metastatic to bone    10. Malignancy associated hypercalcemia        Assessment and Plan:         1. Recurrent NSCLC  Plan is for definitive chemoRT, will need re-staging scans prior.  Reviewed with patient in detail her diagnosis, stage, treatment options, and prognosis (3 year OS 66% vs. 43.5% without durvalumab) with standard of care  chemoRT followed by maintenance immunotherapy.  Patient has consented for [id number] clinical trial, a randomized control trial evaluating combination chemoRT + durvalumab followed by maintenance vs. SOC chemoRT -> maintenance.  CT scans 7/2021 with CR.  - patient randomized on [id number] to SOC arm (Imfinzi) - completed 12/2021.    - CT scan 12/2022 with progressing pulmonary nodule in LLL, still with stable disease in RLL consolidation. PET scan also showing enlarging right lower lobe mass with increased hypermetabolic activity concerning for recurrence. Will present her case at the next thoracic tumor board to discuss biopsy and possible treatment options.   - Biopsy of lung mass consistent with SCC. Initiated on 9LA. Initial re-staging scans with stable disease.   - Patient has completed RT 4-5 weeks ago and has no symptoms. She has some mild inflammation on Chest CT, but since asymptomatic and completed RT will re-initiate IO.  - Now s/p 3 cycles of 9LA. With persistent IO induced rash and continued pneumonitis on recent imaging, plan to hold IO and proceed with surveillance.  - 10/16/23 CT showing continued evidence of inflammation/infection. Currently has cold symptoms. +Covid.   - Repeat CT scan (preliminary reading) shows improvement in inflammation but indeterminate possible new liver metastases. Will confirm with PETCT.  - CT CAP is showing progression of disease in L hilum, hilar LN, mediastinal LN, and pleural effusion.   - Plan is to restart ipi + nivo. Not eligible for any trials. Labs acceptable for C5 of Opdivo+Yervoy. Will get new baseline imaging as last staging scans were in July. Discussed possible side effects of treatment and risk of pneumonitis recurrence. Daughter and patient verbalize understanding.       2,3. Recovering from left femur nailing. Has oxy IR for pain. PT/OT ordered. Will get bone scans with re-staging to evaluate for metastatic bone disease.    4. Resolved. Discussed symptoms to  report with re-challenging immunotherapy.     5.  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      6-8.  Stable, follows with cardiology. AICD placed, but patient now has recovered EF and only takes lasix prn.  NYHA class I.    9,10. Corrected ca = 11.6. Will give IVFs today. Starting authorization for Xgeva. Dental clearance form provided to patient. Has not seen a dentist in 4 years.    11. Nystatin powder prescribed.      Patient is in agreement with the proposed treatment plan. All questions were answered to the patient's satisfaction. Pt knows to call clinic if anything is needed before the next clinic visit.    Aide Magaña, MSN, APRN, FNP-C  Hematology and Medical Oncology  Nurse Practitioner to Dr. Javi Avina  Nurse Practitioner, [facility]        Route Chart for Scheduling    Med Onc Chart Routing  Urgent    Follow up with physician    Follow up with DANIEL 3 weeks. prior to opdivo   Infusion scheduling note    Injection scheduling note    Labs CBC, CMP, free T4 and TSH   Scheduling:  Preferred lab:  Lab interval:     Imaging CT chest abdomen pelvis and bone scans   next available   Pharmacy appointment    Other referrals          Treatment Plan Information   OP NSCLC NIVOLUMAB 360 MG D1 & D22 WITH IPILIMUMAB 1 MG/KG Q6W PLUS CARBOPLATIN (AUC) PACLITAXEL Q3W X2 DOSES Javi Avina MD   Associated diagnosis: Lung mass   noted on 3/12/2020  Associated diagnosis: NSCLC of right lung Stage IIIA, Stage DEREK cT3, cN1, cM0, cT3, cN2, cM1a noted on 10/14/2020   Line of treatment: First Line  Treatment Goal: Control     Upcoming Treatment Dates - OP NSCLC NIVOLUMAB 360 MG D1 & D22 WITH IPILIMUMAB 1 MG/KG Q6W PLUS CARBOPLATIN (AUC) PACLITAXEL Q3W X2 DOSES    10/3/2024       Immunotherapy       nivolumab 360 mg in sodium chloride 0.9% 86 mL infusion  10/24/2024       Immunotherapy       nivolumab 360 mg in 0.9% NaCl 86 mL infusion       ipilimumab (YERVOY) 1 mg/kg  = 81 mg in 0.9% NaCl 66.2 mL chemo infusion  11/14/2024       Immunotherapy       nivolumab 360 mg in sodium chloride 0.9% 86 mL infusion  12/5/2024       Immunotherapy       nivolumab 360 mg in 0.9% NaCl 86 mL infusion       ipilimumab (YERVOY) 1 mg/kg = 81 mg in 0.9% NaCl 66.2 mL chemo infusion    Supportive Plan Information  OP DENOSUMAB (XGEVA) Q4W Aide Magaña NP   Associated Diagnosis: Malignant neoplasm metastatic to bone   noted on 8/21/2024   Line of treatment: Supportive Care   Treatment goal: Supportive     Upcoming Treatment Dates - OP DENOSUMAB (XGEVA) Q4W    9/12/2024       Medications       denosumab (XGEVA) solution 120 mg  10/10/2024       Medications       denosumab (XGEVA) solution 120 mg  11/7/2024       Medications       denosumab (XGEVA) solution 120 mg  12/5/2024       Medications       denosumab (XGEVA) solution 120 mg

## 2024-09-13 ENCOUNTER — TELEPHONE (OUTPATIENT)
Dept: HEMATOLOGY/ONCOLOGY | Facility: CLINIC | Age: 69
End: 2024-09-13
Payer: MEDICARE

## 2024-09-13 ENCOUNTER — OFFICE VISIT (OUTPATIENT)
Dept: FAMILY MEDICINE | Facility: CLINIC | Age: 69
End: 2024-09-13
Payer: MEDICARE

## 2024-09-13 VITALS
TEMPERATURE: 98 F | SYSTOLIC BLOOD PRESSURE: 152 MMHG | BODY MASS INDEX: 33.17 KG/M2 | HEART RATE: 94 BPM | DIASTOLIC BLOOD PRESSURE: 60 MMHG | HEIGHT: 62 IN | OXYGEN SATURATION: 94 % | RESPIRATION RATE: 18 BRPM

## 2024-09-13 DIAGNOSIS — C79.51 MALIGNANT NEOPLASM METASTATIC TO BONE: Primary | ICD-10-CM

## 2024-09-13 DIAGNOSIS — E11.9 TYPE 2 DIABETES MELLITUS WITHOUT COMPLICATION, WITHOUT LONG-TERM CURRENT USE OF INSULIN: ICD-10-CM

## 2024-09-13 DIAGNOSIS — M84.452D: ICD-10-CM

## 2024-09-13 DIAGNOSIS — F33.1 MODERATE EPISODE OF RECURRENT MAJOR DEPRESSIVE DISORDER: ICD-10-CM

## 2024-09-13 DIAGNOSIS — F32.9 REACTIVE DEPRESSION: ICD-10-CM

## 2024-09-13 DIAGNOSIS — I10 BENIGN ESSENTIAL HTN: ICD-10-CM

## 2024-09-13 DIAGNOSIS — C34.91 NSCLC OF RIGHT LUNG: ICD-10-CM

## 2024-09-13 PROCEDURE — 99999 PR PBB SHADOW E&M-EST. PATIENT-LVL V: CPT | Mod: PBBFAC,,, | Performed by: FAMILY MEDICINE

## 2024-09-13 RX ORDER — ATORVASTATIN CALCIUM 10 MG/1
10 TABLET, FILM COATED ORAL
Qty: 90 TABLET | Refills: 1 | Status: CANCELLED | OUTPATIENT
Start: 2024-09-13

## 2024-09-13 RX ORDER — SERTRALINE HYDROCHLORIDE 100 MG/1
100 TABLET, FILM COATED ORAL NIGHTLY
Qty: 90 TABLET | Refills: 2 | Status: CANCELLED | OUTPATIENT
Start: 2024-09-13

## 2024-09-13 RX ORDER — METOPROLOL SUCCINATE 100 MG/1
100 TABLET, EXTENDED RELEASE ORAL
Qty: 90 TABLET | Refills: 3 | Status: CANCELLED | OUTPATIENT
Start: 2024-09-13

## 2024-09-13 RX ORDER — PANTOPRAZOLE SODIUM 40 MG/1
40 TABLET, DELAYED RELEASE ORAL
Qty: 90 TABLET | Refills: 3 | Status: CANCELLED | OUTPATIENT
Start: 2024-09-13

## 2024-09-13 RX ORDER — LOSARTAN POTASSIUM 100 MG/1
100 TABLET ORAL
Qty: 90 TABLET | Refills: 3 | Status: CANCELLED | OUTPATIENT
Start: 2024-09-13

## 2024-09-13 RX ORDER — BUPROPION HYDROCHLORIDE 150 MG/1
150 TABLET ORAL
Qty: 90 TABLET | Refills: 3 | Status: CANCELLED | OUTPATIENT
Start: 2024-09-13

## 2024-09-13 RX ORDER — GABAPENTIN 300 MG/1
300 CAPSULE ORAL 3 TIMES DAILY
Qty: 90 CAPSULE | Refills: 11 | Status: CANCELLED | OUTPATIENT
Start: 2024-09-13 | End: 2025-09-13

## 2024-09-13 RX ORDER — OXYCODONE HYDROCHLORIDE 5 MG/1
5 TABLET ORAL EVERY 6 HOURS PRN
Qty: 28 TABLET | Refills: 0 | Status: SHIPPED | OUTPATIENT
Start: 2024-09-13

## 2024-09-13 NOTE — PROGRESS NOTES
Routine Office Visit    Patient Name: Hannah Montoya    : 1955  MRN: [id number]    Subjective:  Hannah is a 69 y.o. female who presents today for:    1. Follow up  Patient presenting today for follow up after being admitted for left leg fracture and requiring surgery to also remove an area in the hip that has been eroded by cancer per patient.  She is currently in PT twice a week and is staying uncomfortable.  She has made her pain medication last the past couple of weeks, but is almost out and is requesting a refill as she doesn't see her orthopedic for a few more weeks.  She states that she is not moving her left leg well and that has her feeling down as she is not able to stand and walk on her own like she would prefer.  She has been feeling very depressed about the recent discovery that her cancer had spread.  She states that she feels that everything she has been through XO1 has become overwhelming.    Past Medical History  Past Medical History:   Diagnosis Date    AICD (automatic cardioverter/defibrillator) present     Asthma     bronchitis in past    Breast cancer 2016    right    Cardiac pacemaker     Cardiomyopathy     COPD (chronic obstructive pulmonary disease)     Diabetes mellitus, type 2     Hyperglycemia     Hyperlipidemia     Hypertension     Malignant neoplasm of overlapping sites of female breast 2016    Nuclear sclerosis of both eyes 2020    Respiratory distress 3/12/2020       Past Surgical History  Past Surgical History:   Procedure Laterality Date    BIOPSY, WITH CT GUIDANCE Right 2023    Procedure: LUNG MASS BIOPSY, WITH CT GUIDANCE;  Surgeon: Yehuda Green MD;  Location: [facility] CATH LAB;  Service: Radiology;  Laterality: Right;    BREAST BIOPSY Right 2016    IDC    BREAST LUMPECTOMY Right     CARDIAC CATHETERIZATION Bilateral 2017    CARDIAC DEFIBRILLATOR PLACEMENT Left 08/10/2017    CARDIAC DEFIBRILLATOR PLACEMENT Left 2018      SECTION      x2    CHOLECYSTECTOMY      FEMUR BIOPSY Left 8/20/2024    Procedure: BIOPSY, FEMUR;  Surgeon: Porter Campos MD;  Location: [facility] OR [address] [place] [address];  Service: Orthopedics;  Laterality: Left;    INSERTION OF TUNNELED CENTRAL VENOUS CATHETER (CVC) WITH SUBCUTANEOUS PORT Right 11/11/2020    Procedure: [id number], RIGHT;  Surgeon: Josefa Caceres MD;  Location: [facility] OR [address] [place] [address];  Service: General;  Laterality: Right;  Port-a-cath placed to R. IJ    INTRAMEDULLARY RODDING OF FEMUR Left 8/20/2024    Procedure: INSERTION, INTRAMEDULLARY ANTOINE, FEMUR;  Surgeon: Porter Campos MD;  Location: [facility] OR [address] [place] [address];  Service: Orthopedics;  Laterality: Left;    LUNG BIOPSY N/A 5/28/2020    Procedure: BIOPSY, LUNG;  Surgeon: Kalpesh Diagnostic Provider;  Location: [facility] OR;  Service: Radiology;  Laterality: N/A;  8AM START  RN PREOP 5/26/2020---COVID NEGATIVE    NAVIGATIONAL BRONCHOSCOPY N/A 10/13/2020    Procedure: BRONCHOSCOPY, NAVIGATIONAL;  Surgeon: Jamilah Weaver MD;  Location: [facility] OR [address] [place] [address];  Service: Pulmonary;  Laterality: N/A;    REVISION OF IMPLANTABLE CARDIOVERTER-DEFIBRILLATOR (ICD) ELECTRODE LEAD PLACEMENT N/A 6/15/2018    Procedure: REVISION-LEAD-ICD;  Surgeon: Javy Gates MD;  Location: [facility] CATH LAB;  Service: Cardiology;  Laterality: N/A;  LBBB, Bi-V ICD HIS Elmo jordan, MDT, Choice, MB, 3 Prep    ROBOT-ASSISTED LAPAROSCOPIC LYMPHADENECTOMY USING DA SALVADOR XI Right 10/23/2020    Procedure: XI ROBOTIC LYMPHADENECTOMY;  Surgeon: Ramo Tucker MD;  Location: [address];  Service: Thoracic;  Laterality: Right;    TUBAL LIGATION         Family History  Family History   Problem Relation Name Age of Onset    Kidney disease Mother      Cataracts Mother      Kidney disease Father      Cataracts Father      Clotting disorder Brother      No Known Problems Daughter      No Known Problems Son      No Known Problems Sister      No Known Problems Maternal Aunt      No Known Problems  Maternal Uncle      No Known Problems Paternal Aunt      No Known Problems Paternal Uncle      Macular degeneration Maternal Grandmother      No Known Problems Maternal Grandfather      No Known Problems Paternal Grandmother      No Known Problems Paternal Grandfather      Breast cancer Neg Hx      Ovarian cancer Neg Hx      Amblyopia Neg Hx      Blindness Neg Hx      Cancer Neg Hx      Diabetes Neg Hx      Glaucoma Neg Hx      Hypertension Neg Hx      Retinal detachment Neg Hx      Strabismus Neg Hx      Stroke Neg Hx      Thyroid disease Neg Hx         Social History  Social History     Socioeconomic History    Marital status:     Number of children: 2   Occupational History     Employer: [employer]   Tobacco Use    Smoking status: Former     Current packs/day: 0.00     Average packs/day: 0.5 packs/day for 40.0 years (20.0 ttl pk-yrs)     Types: Cigarettes     Start date: 1976     Quit date: 2016     Years since quittin.1     Passive exposure: Past    Smokeless tobacco: Never   Substance and Sexual Activity    Alcohol use: Yes     Alcohol/week: 1.0 standard drink of alcohol     Types: 1 Glasses of wine per week     Comment: rare- holiday    Drug use: No    Sexual activity: Not Currently     Partners: Male     Comment:      Social Determinants of Health     Financial Resource Strain: Low Risk  (2024)    Overall Financial Resource Strain (CARDIA)     Difficulty of Paying Living Expenses: Not very hard   Food Insecurity: Food Insecurity Present (2024)    Hunger Vital Sign     Worried About Running Out of Food in the Last Year: Sometimes true     Ran Out of Food in the Last Year: Never true   Transportation Needs: No Transportation Needs (2024)    PRAPARE - Transportation     Lack of Transportation (Medical): No     Lack of Transportation (Non-Medical): No   Physical Activity: Inactive (2024)    Exercise Vital Sign     Days of Exercise per Week: 0 days     Minutes of  Exercise per Session: 0 min   Stress: No Stress Concern Present (8/22/2024)    [employer] - Occupational Stress Questionnaire     Feeling of Stress : Not at all   Housing Stability: Low Risk  (2/13/2024)    Housing Stability Vital Sign     Unable to Pay for Housing in the Last Year: No     Number of Places Lived in the Last Year: 1     Unstable Housing in the Last Year: No       Current Medications  Current Outpatient Medications on File Prior to Visit   Medication Sig Dispense Refill    ACCU-CHEK ALFRED PLUS TEST STRP Strp USE TO TEST THREE TIMES DAILY 200 strip 3    acetaminophen (TYLENOL) 500 MG tablet Take 2 tablets (1,000 mg total) by mouth every 8 (eight) hours as needed for Pain.      albuterol (ACCUNEB) 1.25 mg/3 mL Nebu use 1 AMPULE (3ml) via NEBULIZER EVERY 6 HOURS AS NEEDED 300 mL 3    albuterol (VENTOLIN HFA) 90 mcg/actuation inhaler Inhale 2 puffs into the lungs every 4 (four) hours as needed for Wheezing or Shortness of Breath. Rescue 18 g 4    amLODIPine (NORVASC) 5 MG tablet TAKE 1 TABLET BY MOUTH EVERY DAY 90 tablet 2    apixaban (ELIQUIS) 2.5 mg Tab Take 1 tablet (2.5 mg total) by mouth 2 (two) times daily. DVT prophylaxis after hip surgery. End date 9/22/2024. 60 tablet 0    atorvastatin (LIPITOR) 10 MG tablet TAKE 1 TABLET BY MOUTH EVERY DAY 90 tablet 1    benzonatate (TESSALON) 200 MG capsule Take 1 capsule (200 mg total) by mouth 3 (three) times daily as needed for Cough. 30 capsule 1    buPROPion (WELLBUTRIN XL) 150 MG TB24 tablet TAKE 1 TABLET BY MOUTH EVERY DAY 90 tablet 3    cetirizine (ZYRTEC) 10 MG tablet Take 10 mg by mouth every evening.       cholecalciferol, vitamin D3, (VITAMIN D3) 50 mcg (2,000 unit) Tab Take 1 tablet (2,000 Units total) by mouth once daily. 30 tablet 11    gabapentin (NEURONTIN) 300 MG capsule Take 1 capsule (300 mg total) by mouth 3 (three) times daily. 90 capsule 11    losartan (COZAAR) 100 MG tablet TAKE 1 TABLET BY MOUTH EVERY DAY  90 tablet 3    meloxicam (MOBIC) 7.5 MG tablet Take 1 tablet (7.5 mg total) by mouth once daily. 30 tablet 1    metFORMIN (GLUCOPHAGE) 500 MG tablet TAKE 2 TABLETS BY MOUTH TWICE A DAY WITH MEALS 360 tablet 3    methocarbamoL (ROBAXIN) 750 MG Tab Take 1 tablet (750 mg total) by mouth 4 (four) times daily. 120 tablet 0    metoprolol succinate (TOPROL-XL) 100 MG 24 hr tablet TAKE 1 TABLET BY MOUTH EVERY DAY 90 tablet 3    nystatin (MYCOSTATIN) powder Apply topically 2 (two) times daily. 60 g 1    ondansetron (ZOFRAN-ODT) 8 MG TbDL Take 1 tablet (8 mg total) by mouth every 8 (eight) hours as needed (nausea/vomiting). Take 1 tablet (8 mg) by mouth every 8 hours as needed for nausea/vomiting. 60 tablet 5    pantoprazole (PROTONIX) 40 MG tablet TAKE 1 TABLET BY MOUTH EVERY DAY 90 tablet 3    READI-CAT 2 2 % (w/v) suspension       READI-CAT 2 2.1 % (w/v), 2.0 % (w/w) suspension       sertraline (ZOLOFT) 100 MG tablet TAKE 1 TABLET BY MOUTH EVERY DAY IN THE EVENING 90 tablet 2    tiotropium (SPIRIVA WITH HANDIHALER) 18 mcg inhalation capsule INHALE THE CONTENTS OF 1 CAPSULE BY MOUTH EVERY DAY 90 capsule 3    WIXELA INHUB 250-50 mcg/dose diskus inhaler INHALE 1 PUFF INTO THE LUNGS 2 (TWO) TIMES DAILY. CONTROLLER 180 each 3    [DISCONTINUED] oxyCODONE (ROXICODONE) 5 MG immediate release tablet Take 1 tablet (5 mg total) by mouth every 6 (six) hours as needed for Pain. 28 tablet 0    multivitamin (THERAGRAN) per tablet Take 1 tablet by mouth once daily. (Patient not taking: Reported on 9/12/2024)      palonosetron (ALOXI) 0.25 mg/5 mL injection  (Patient not taking: Reported on 9/12/2024)       Current Facility-Administered Medications on File Prior to Visit   Medication Dose Route Frequency Provider Last Rate Last Admin    fentaNYL injection 25 mcg  25 mcg Intravenous Q5 Min PRN Keesha Martins MD        haloperidol lactate injection 0.5 mg  0.5 mg Intravenous Once PRN Keesha Martins MD        HYDROmorphone injection 0.2 mg  0.2 mg  "Intravenous Q5 Min PRN Keesha Martins MD        ondansetron injection 4 mg  4 mg Intravenous Once PRN Keesha Martins MD        sodium chloride 0.9% flush 10 mL  10 mL Intravenous PRN Keesha Martins MD        [DISCONTINUED] alteplase injection 2 mg  2 mg Intra-Catheter PRN Aide Magaña, NP        [DISCONTINUED] diphenhydrAMINE injection 50 mg  50 mg Intravenous Once PRN Aide Magaña, NP        [DISCONTINUED] EPINEPHrine (EPIPEN) 0.3 mg/0.3 mL pen injection 0.3 mg  0.3 mg Intramuscular Once PRN Aide Magaña, NP        [DISCONTINUED] heparin, porcine (PF) 100 unit/mL injection flush 500 Units  500 Units Intravenous PRN Adie Magaña, SOCRATES        [DISCONTINUED] hydrocortisone sodium succinate injection 100 mg  100 mg Intravenous Once PRN Aide Magaña, SOCRATES        [DISCONTINUED] sodium chloride 0.9% flush 10 mL  10 mL Intravenous PRN Aide Magaña NP           Allergies   Review of patient's allergies indicates:   Allergen Reactions    Taxol [paclitaxel]      Hypersensitivity reaction to taxol, symptoms included shortness of breath, nausea, dizziness, flushing     Carboplatin Other (See Comments)     Itching and hives    Adhesive Rash     tegaderm burns and blistered skin       Review of Systems (Pertinent positives)  Review of Systems   Constitutional: Negative.    HENT: Negative.     Eyes: Negative.    Respiratory: Negative.     Cardiovascular: Negative.    Musculoskeletal:  Positive for joint pain and myalgias.   Skin: Negative.    Psychiatric/Behavioral:  Positive for depression. Negative for hallucinations, memory loss, substance abuse and suicidal ideas.          BP (!) 152/60   Pulse 94   Temp 97.7 °F (36.5 °C) (Oral)   Resp 18   Ht 5' 2" (1.575 m)   LMP  (LMP Unknown)   SpO2 (!) 94%   BMI 33.17 kg/m²     GENERAL APPEARANCE: in no apparent distress and well developed and well nourished  HEENT: PERRL, EOMI, Sclera clear, anicteric, Oropharynx clear, no lesions, Neck supple with midline " trachea  NECK: normal, supple, no adenopathy, thyroid normal in size  RESPIRATORY: appears well, vitals normal, no respiratory distress, acyanotic, normal RR  NEUROLOGIC: normal without focal findings, CN II-XII are intact.    Extremities: warm/well perfused.  No abnormal hair patterns.  No clubbing, cyanosis.  2+ LLE edema  SKIN: no rashes, no wounds, no other lesions  PSYCH: Alert, oriented x 3, thought content appropriate, speech normal, pleasant and cooperative, good eye contact, well groomed;    Assessment/Plan:  Hannah Montoya is a 69 y.o. female who presents today for :    Hannah was seen today for diabetes, hypertension, knee pain and hip pain.    Diagnoses and all orders for this visit:    Malignant neoplasm metastatic to bone  - Refilled oxycodone  - Follow up with oncology and ortho as scheduled    Pathological fracture of intertrochanteric section of left femur with routine healing, subsequent encounter s/p IM nailing on 8/20/24  -     oxyCODONE (ROXICODONE) 5 MG immediate release tablet; Take 1 tablet (5 mg total) by mouth every 6 (six) hours as needed for Pain.  - Patient to take medication as prescribed    Reactive depression  - Patient will discuss her depression with oncology to see who she should see  - She would rather see psychiatry virtually    Benign essential HTN  - Controlled on current regimen    Type 2 diabetes mellitus without complication, without long-term current use of insulin  - Controlled on current regimen    NSCLC of right lung  - No acute symptoms  - Follow up with oncology as scheduled        Emanuel Chavez MD

## 2024-09-16 DIAGNOSIS — C34.91 NSCLC OF RIGHT LUNG: Primary | ICD-10-CM

## 2024-09-18 ENCOUNTER — HOSPITAL ENCOUNTER (OUTPATIENT)
Dept: RADIOLOGY | Facility: HOSPITAL | Age: 69
Discharge: HOME | End: 2024-09-18
Attending: NURSE PRACTITIONER
Payer: MEDICARE

## 2024-09-18 ENCOUNTER — TELEPHONE (OUTPATIENT)
Dept: ELECTROPHYSIOLOGY | Facility: CLINIC | Age: 69
End: 2024-09-18
Payer: MEDICARE

## 2024-09-18 DIAGNOSIS — C34.91 NSCLC OF RIGHT LUNG: ICD-10-CM

## 2024-09-18 PROCEDURE — 71260 CT THORAX DX C+: CPT | Mod: 26,HCNC,, | Performed by: RADIOLOGY

## 2024-09-18 PROCEDURE — 74177 CT ABD & PELVIS W/CONTRAST: CPT | Mod: 26,HCNC,, | Performed by: RADIOLOGY

## 2024-09-18 PROCEDURE — 74177 CT ABD & PELVIS W/CONTRAST: CPT | Mod: TC,HCNC

## 2024-09-18 PROCEDURE — 25500020 PHARM REV CODE 255: Mod: HCNC | Performed by: NURSE PRACTITIONER

## 2024-09-18 RX ADMIN — IOHEXOL 100 ML: 350 INJECTION, SOLUTION INTRAVENOUS at 01:09

## 2024-09-18 NOTE — TELEPHONE ENCOUNTER
Returned call to patient. I let her know that I called yesterday to offer her a sooner procedure appointment due to a cancellation but that we already filled the spot. Patient states she has so many appointments she would rather just stay on 10/10/24 for her procedure.

## 2024-09-18 NOTE — TELEPHONE ENCOUNTER
----- Message from Herminio Romo MA sent at 9/18/2024  3:24 PM CDT -----  Pt stated that you may have been the one to have left her a message.  ----- Message -----  From: Kenya Rodriguez  Sent: 9/18/2024  12:18 PM CDT  To: [facility] Staff    Patient returning call. Please call back @ [phone]. Thank you Kenya

## 2024-09-19 ENCOUNTER — PATIENT MESSAGE (OUTPATIENT)
Dept: ORTHOPEDICS | Facility: CLINIC | Age: 69
End: 2024-09-19
Payer: MEDICARE

## 2024-09-20 ENCOUNTER — HOSPITAL ENCOUNTER (OUTPATIENT)
Dept: RADIOLOGY | Facility: HOSPITAL | Age: 69
Discharge: HOME | End: 2024-09-20
Attending: NURSE PRACTITIONER
Payer: MEDICARE

## 2024-09-20 ENCOUNTER — PATIENT MESSAGE (OUTPATIENT)
Dept: ORTHOPEDICS | Facility: CLINIC | Age: 69
End: 2024-09-20
Payer: MEDICARE

## 2024-09-20 DIAGNOSIS — C34.91 NSCLC OF RIGHT LUNG: ICD-10-CM

## 2024-09-20 PROCEDURE — 78306 BONE IMAGING WHOLE BODY: CPT | Mod: 26,HCNC,, | Performed by: NUCLEAR MEDICINE

## 2024-09-20 PROCEDURE — A9503 TC99M MEDRONATE: HCPCS | Mod: HCNC | Performed by: NURSE PRACTITIONER

## 2024-09-20 PROCEDURE — 78306 BONE IMAGING WHOLE BODY: CPT | Mod: TC,HCNC

## 2024-09-20 RX ORDER — TC 99M MEDRONATE 20 MG/10ML
20 INJECTION, POWDER, LYOPHILIZED, FOR SOLUTION INTRAVENOUS
Status: COMPLETED | OUTPATIENT
Start: 2024-09-20 | End: 2024-09-20

## 2024-09-20 RX ADMIN — TECHNETIUM TC 99M MEDRONATE 20 MILLICURIE: 20 INJECTION, POWDER, LYOPHILIZED, FOR SOLUTION INTRAVENOUS at 10:09

## 2024-09-23 ENCOUNTER — HOSPITAL ENCOUNTER (EMERGENCY)
Facility: HOSPITAL | Age: 69
Discharge: HOME | End: 2024-09-23
Attending: EMERGENCY MEDICINE
Payer: MEDICARE

## 2024-09-23 ENCOUNTER — TELEPHONE (OUTPATIENT)
Dept: PSYCHIATRY | Facility: CLINIC | Age: 69
End: 2024-09-23
Payer: MEDICARE

## 2024-09-23 VITALS
DIASTOLIC BLOOD PRESSURE: 67 MMHG | TEMPERATURE: 98 F | SYSTOLIC BLOOD PRESSURE: 142 MMHG | RESPIRATION RATE: 18 BRPM | OXYGEN SATURATION: 98 % | HEART RATE: 92 BPM

## 2024-09-23 DIAGNOSIS — M79.89 LEG SWELLING: ICD-10-CM

## 2024-09-23 DIAGNOSIS — M25.559 HIP PAIN: ICD-10-CM

## 2024-09-23 PROBLEM — M25.552 LEFT HIP PAIN: Status: ACTIVE | Noted: 2024-09-23

## 2024-09-23 LAB
ALBUMIN SERPL BCP-MCNC: 2.4 G/DL (ref 3.5–5.2)
ALP SERPL-CCNC: 76 U/L (ref 55–135)
ALT SERPL W/O P-5'-P-CCNC: 8 U/L (ref 10–44)
ANION GAP SERPL CALC-SCNC: 13 MMOL/L (ref 8–16)
AST SERPL-CCNC: 15 U/L (ref 10–40)
BASOPHILS # BLD AUTO: 0.06 K/UL (ref 0–0.2)
BASOPHILS NFR BLD: 0.9 % (ref 0–1.9)
BILIRUB SERPL-MCNC: 0.3 MG/DL (ref 0.1–1)
BUN SERPL-MCNC: 21 MG/DL (ref 8–23)
CALCIUM SERPL-MCNC: 10.7 MG/DL (ref 8.7–10.5)
CHLORIDE SERPL-SCNC: 99 MMOL/L (ref 95–110)
CO2 SERPL-SCNC: 22 MMOL/L (ref 23–29)
CREAT SERPL-MCNC: 0.9 MG/DL (ref 0.5–1.4)
DIFFERENTIAL METHOD BLD: ABNORMAL
EOSINOPHIL # BLD AUTO: 0 K/UL (ref 0–0.5)
EOSINOPHIL NFR BLD: 0.5 % (ref 0–8)
ERYTHROCYTE [DISTWIDTH] IN BLOOD BY AUTOMATED COUNT: 19.1 % (ref 11.5–14.5)
EST. GFR  (NO RACE VARIABLE): >60 ML/MIN/1.73 M^2
GLUCOSE SERPL-MCNC: 201 MG/DL (ref 70–110)
HCT VFR BLD AUTO: 28.3 % (ref 37–48.5)
HGB BLD-MCNC: 8.1 G/DL (ref 12–16)
IMM GRANULOCYTES # BLD AUTO: 0.13 K/UL (ref 0–0.04)
IMM GRANULOCYTES NFR BLD AUTO: 2.1 % (ref 0–0.5)
LYMPHOCYTES # BLD AUTO: 0.4 K/UL (ref 1–4.8)
LYMPHOCYTES NFR BLD: 6.5 % (ref 18–48)
MCH RBC QN AUTO: 22 PG (ref 27–31)
MCHC RBC AUTO-ENTMCNC: 28.6 G/DL (ref 32–36)
MCV RBC AUTO: 77 FL (ref 82–98)
MONOCYTES # BLD AUTO: 0.8 K/UL (ref 0.3–1)
MONOCYTES NFR BLD: 13 % (ref 4–15)
NEUTROPHILS # BLD AUTO: 4.9 K/UL (ref 1.8–7.7)
NEUTROPHILS NFR BLD: 77 % (ref 38–73)
NRBC BLD-RTO: 0 /100 WBC
PLATELET # BLD AUTO: 252 K/UL (ref 150–450)
PMV BLD AUTO: 11.3 FL (ref 9.2–12.9)
POTASSIUM SERPL-SCNC: 4.4 MMOL/L (ref 3.5–5.1)
PROT SERPL-MCNC: 6.9 G/DL (ref 6–8.4)
RBC # BLD AUTO: 3.68 M/UL (ref 4–5.4)
SODIUM SERPL-SCNC: 134 MMOL/L (ref 136–145)
WBC # BLD AUTO: 6.32 K/UL (ref 3.9–12.7)

## 2024-09-23 PROCEDURE — 25000003 PHARM REV CODE 250: Mod: HCNC

## 2024-09-23 PROCEDURE — 63600175 PHARM REV CODE 636 W HCPCS: Mod: HCNC

## 2024-09-23 PROCEDURE — 80053 COMPREHEN METABOLIC PANEL: CPT | Mod: HCNC

## 2024-09-23 PROCEDURE — 96374 THER/PROPH/DIAG INJ IV PUSH: CPT | Mod: HCNC

## 2024-09-23 PROCEDURE — 99285 EMERGENCY DEPT VISIT HI MDM: CPT | Mod: 25,HCNC

## 2024-09-23 PROCEDURE — 85025 COMPLETE CBC W/AUTO DIFF WBC: CPT | Mod: HCNC

## 2024-09-23 RX ORDER — MORPHINE SULFATE 4 MG/ML
4 INJECTION, SOLUTION INTRAMUSCULAR; INTRAVENOUS
Status: COMPLETED | OUTPATIENT
Start: 2024-09-23 | End: 2024-09-23

## 2024-09-23 RX ORDER — OXYCODONE AND ACETAMINOPHEN 5; 325 MG/1; MG/1
1 TABLET ORAL
Status: COMPLETED | OUTPATIENT
Start: 2024-09-23 | End: 2024-09-23

## 2024-09-23 RX ADMIN — OXYCODONE HYDROCHLORIDE AND ACETAMINOPHEN 1 TABLET: 5; 325 TABLET ORAL at 08:09

## 2024-09-23 RX ADMIN — MORPHINE SULFATE 4 MG: 4 INJECTION INTRAVENOUS at 04:09

## 2024-09-23 NOTE — CONSULTS
[facility] - Emergency Dept  Orthopedics  Consult Note    Patient Name: Hannah Montoya  MRN: [id number]  Admission Date: 9/23/2024  Hospital Length of Stay: 0 days  Attending Provider: Darlene Orellana,*  Primary Care Provider: Emanuel Chavez MD    Inpatient consult to Orthopedic Surgery  Consult performed by: Darci Latham MD  Consult ordered by: Darlene Orellana MD        Subjective:     Principal Problem:Left hip pain    Chief Complaint:   Chief Complaint   Patient presents with    left hip pain     Left hip pain home medication not helping after surgery was done here 3 weeks ago        HPI: Hannah Montoya is a 69 y.o. female with Pmhx of Metastatic lung cancer & Left pathologic IT fracture s/p IMN on 08/20 presenting with left hip pain after a twisting motion last night. She was able to bear weight after but was painful. Sh ehas been doing well post-op and had senait able to ambulate with RW. Patient denies numbness and tingling. Denies any other musculoskeletal pain or injuries. She is on eliquis.      Past Medical History:   Diagnosis Date    AICD (automatic cardioverter/defibrillator) present     Asthma     bronchitis in past    Breast cancer 2016    right    Cardiac pacemaker     Cardiomyopathy     COPD (chronic obstructive pulmonary disease)     Diabetes mellitus, type 2     Hyperglycemia     Hyperlipidemia     Hypertension     Malignant neoplasm of overlapping sites of female breast 2/12/2016    Nuclear sclerosis of both eyes 8/12/2020    Respiratory distress 3/12/2020       Past Surgical History:   Procedure Laterality Date    BIOPSY, WITH CT GUIDANCE Right 1/24/2023    Procedure: LUNG MASS BIOPSY, WITH CT GUIDANCE;  Surgeon: Yehuda Green MD;  Location: [facility] CATH LAB;  Service: Radiology;  Laterality: Right;    BREAST BIOPSY Right 2/2016    IDC    BREAST LUMPECTOMY Right     CARDIAC CATHETERIZATION Bilateral 04/04/2017    CARDIAC DEFIBRILLATOR PLACEMENT Left 08/10/2017     CARDIAC DEFIBRILLATOR PLACEMENT Left 2018     SECTION      x2    CHOLECYSTECTOMY      FEMUR BIOPSY Left 2024    Procedure: BIOPSY, FEMUR;  Surgeon: Porter Campos MD;  [facility];  Service: Orthopedics;  Laterality: Left;    INSERTION OF TUNNELED CENTRAL VENOUS CATHETER (CVC) WITH SUBCUTANEOUS PORT Right 2020    Procedure: UVLNLAZUE-CEUE-U-CATH, RIGHT;  Surgeon: Josefa Caceres MD;  Location: [facility];  Service: General;  Laterality: Right;  Port-a-cath placed to R. IJ    INTRAMEDULLARY RODDING OF FEMUR Left 2024    Procedure: INSERTION, INTRAMEDULLARY ANTOINE, FEMUR;  Surgeon: Porter Campos MD;  Location: [facility];  Service: Orthopedics;  Laterality: Left;    LUNG BIOPSY N/A 2020    Procedure: BIOPSY, LUNG;  Surgeon: Dosfernando Diagnostic Provider;  Location: [facility] OR;  Service: Radiology;  Laterality: N/A;  8AM START  RN PREOP 2020---COVID NEGATIVE    NAVIGATIONAL BRONCHOSCOPY N/A 10/13/2020    Procedure: BRONCHOSCOPY, NAVIGATIONAL;  Surgeon: Jamilah Weaver MD;  Location: [facility];  Service: Pulmonary;  Laterality: N/A;    REVISION OF IMPLANTABLE CARDIOVERTER-DEFIBRILLATOR (ICD) ELECTRODE LEAD PLACEMENT N/A 6/15/2018    Procedure: REVISION-LEAD-ICD;  Surgeon: Javy Gates MD;  Location: [facility] CATH LAB;  Service: Cardiology;  Laterality: N/A;  LBBB, Bi-V ICD HIS Elmo jordan MDT, Choice, MB, 3 Prep    ROBOT-ASSISTED LAPAROSCOPIC LYMPHADENECTOMY USING DA SALVADOR XI Right 10/23/2020    Procedure: XI ROBOTIC LYMPHADENECTOMY;  Surgeon: Ramo Tucker MD;  Location: [facility];  Service: Thoracic;  Laterality: Right;    TUBAL LIGATION         Review of patient's allergies indicates:   Allergen Reactions    Taxol [paclitaxel]      Hypersensitivity reaction to taxol, symptoms included shortness of breath, nausea, dizziness, flushing     Carboplatin Other (See Comments)     Itching and hives    Adhesive Rash     tegaderm  burns and blistered skin       No current facility-administered medications for this encounter.     Current Outpatient Medications   Medication Sig    ACCU-CHEK AFLRED PLUS TEST STRP Strp USE TO TEST THREE TIMES DAILY    acetaminophen (TYLENOL) 500 MG tablet Take 2 tablets (1,000 mg total) by mouth every 8 (eight) hours as needed for Pain.    albuterol (ACCUNEB) 1.25 mg/3 mL Nebu use 1 AMPULE (3ml) via NEBULIZER EVERY 6 HOURS AS NEEDED    albuterol (VENTOLIN HFA) 90 mcg/actuation inhaler Inhale 2 puffs into the lungs every 4 (four) hours as needed for Wheezing or Shortness of Breath. Rescue    amLODIPine (NORVASC) 5 MG tablet TAKE 1 TABLET BY MOUTH EVERY DAY    atorvastatin (LIPITOR) 10 MG tablet TAKE 1 TABLET BY MOUTH EVERY DAY    benzonatate (TESSALON) 200 MG capsule Take 1 capsule (200 mg total) by mouth 3 (three) times daily as needed for Cough.    buPROPion (WELLBUTRIN XL) 150 MG TB24 tablet TAKE 1 TABLET BY MOUTH EVERY DAY    cetirizine (ZYRTEC) 10 MG tablet Take 10 mg by mouth every evening.     cholecalciferol, vitamin D3, (VITAMIN D3) 50 mcg (2,000 unit) Tab Take 1 tablet (2,000 Units total) by mouth once daily.    gabapentin (NEURONTIN) 300 MG capsule Take 1 capsule (300 mg total) by mouth 3 (three) times daily.    losartan (COZAAR) 100 MG tablet TAKE 1 TABLET BY MOUTH EVERY DAY    meloxicam (MOBIC) 7.5 MG tablet Take 1 tablet (7.5 mg total) by mouth once daily.    metFORMIN (GLUCOPHAGE) 500 MG tablet TAKE 2 TABLETS BY MOUTH TWICE A DAY WITH MEALS    metoprolol succinate (TOPROL-XL) 100 MG 24 hr tablet TAKE 1 TABLET BY MOUTH EVERY DAY    multivitamin (THERAGRAN) per tablet Take 1 tablet by mouth once daily. (Patient not taking: Reported on 9/12/2024)    nystatin (MYCOSTATIN) powder Apply topically 2 (two) times daily.    ondansetron (ZOFRAN-ODT) 8 MG TbDL Take 1 tablet (8 mg total) by mouth every 8 (eight) hours as needed (nausea/vomiting). Take 1 tablet (8 mg) by mouth every 8 hours as needed for  nausea/vomiting.    oxyCODONE (ROXICODONE) 5 MG immediate release tablet Take 1 tablet (5 mg total) by mouth every 6 (six) hours as needed for Pain.    palonosetron (ALOXI) 0.25 mg/5 mL injection  (Patient not taking: Reported on 2024)    pantoprazole (PROTONIX) 40 MG tablet TAKE 1 TABLET BY MOUTH EVERY DAY    READI-CAT 2 2 % (w/v) suspension     READI-CAT 2 2.1 % (w/v), 2.0 % (w/w) suspension     sertraline (ZOLOFT) 100 MG tablet TAKE 1 TABLET BY MOUTH EVERY DAY IN THE EVENING    tiotropium (SPIRIVA WITH HANDIHALER) 18 mcg inhalation capsule INHALE THE CONTENTS OF 1 CAPSULE BY MOUTH EVERY DAY    WIXELA INHUB 250-50 mcg/dose diskus inhaler INHALE 1 PUFF INTO THE LUNGS 2 (TWO) TIMES DAILY. CONTROLLER     Facility-Administered Medications Ordered in Other Encounters   Medication    fentaNYL injection 25 mcg    haloperidol lactate injection 0.5 mg    HYDROmorphone injection 0.2 mg    ondansetron injection 4 mg    sodium chloride 0.9% flush 10 mL     Family History       Problem Relation (Age of Onset)    Cataracts Mother, Father    Clotting disorder Brother    Kidney disease Mother, Father    Macular degeneration Maternal Grandmother    No Known Problems Daughter, Son, Sister, Maternal Aunt, Maternal Uncle, Paternal Aunt, Paternal Uncle, Maternal Grandfather, Paternal Grandmother, Paternal Grandfather          Tobacco Use    Smoking status: Former     Current packs/day: 0.00     Average packs/day: 0.5 packs/day for 40.0 years (20.0 ttl pk-yrs)     Types: Cigarettes     Start date: 1976     Quit date: 2016     Years since quittin.1     Passive exposure: Past    Smokeless tobacco: Never   Substance and Sexual Activity    Alcohol use: Yes     Alcohol/week: 1.0 standard drink of alcohol     Types: 1 Glasses of wine per week     Comment: rare- holiday    Drug use: No    Sexual activity: Not Currently     Partners: Male     Comment:      ROS  Constitutional: negative for fevers  Eyes: negative visual  "changes  ENT: negative for hearing loss  Respiratory: negative for dyspnea  Cardiovascular: negative for chest pain  Gastrointestinal: negative for abdominal pain  Genitourinary: negative for dysuria  Neurological: negative for headaches  Behavioral/Psych: negative for hallucinations  Endocrine: negative for temperature intolerance    Objective:     Vital Signs (Most Recent):  Temp: 98.3 °F (36.8 °C) (09/23/24 0317)  Pulse: 97 (09/23/24 0317)  Resp: 18 (09/23/24 0415)  BP: (!) 178/88 (09/23/24 0317)  SpO2: 97 % (09/23/24 0317) Vital Signs (24h Range):  Temp:  [98.3 °F (36.8 °C)] 98.3 °F (36.8 °C)  Pulse:  [97] 97  Resp:  [18-20] 18  SpO2:  [97 %] 97 %  BP: (178)/(88) 178/88           There is no height or weight on file to calculate BMI.    No intake or output data in the 24 hours ending 09/23/24 0529     Ortho/SPM Exam   Gen:  No acute distress, well-developed, well nourished.  CV:  Peripherally well-perfused. 2+ radial pulses, symmetric.  Respiratory:  Normal respiratory effort. No accessory muscle use.   Head/Neck:  Normocephalic.  Atraumatic. Sclera anicteric. TM. Neck supple.  Neuro: No FND. Awake. Alert. Oriented to person, place, time, and situation.  Abdomen: Soft, NTND.      MSK:  Right Upper Extremity  Inspection  - Skin intact throughout, no open wounds  - No swelling  - No ecchymosis, erythema, or signs of cellulitis  Palpation  - NonTTP throughout, no palpable abnormality   Range of motion  - AROM and PROM of the shoulder, elbow, wrist, and hand intact  Stability  - No evidence of joint dislocation or abnormal laxity   Neurovascular  - AIN/PIN/Radial/Median/Ulnar Nerves assessed in isolation without deficit  - Able to give thumbs up, make "OK" sign, cross IF/LF, abduct/adduct fingers, make fist  - SILT throughout  - Compartments soft  - Radial artery 2+  - Muscle tone normal    Left Upper Extremity  Inspection  - Skin intact throughout, no open wounds  - No swelling  - No ecchymosis, erythema, or signs " "of cellulitis  Palpation  - NonTTP throughout, no palpable abnormality   Range of motion  - AROM and PROM of the shoulder, elbow, wrist, and hand intact  Stability  - No evidence of joint dislocation or abnormal laxity  Neurovascular  - AIN/PIN/Radial/Median/Ulnar Nerves assessed in isolation without deficit  - Able to give thumbs up, make "OK" sign, cross IF/LF, abduct/adduct fingers, make fist  - SILT throughout  - Compartments soft  - Radial artery 2+  - Muscle tone normal      Right Lower Extremity  Inspection  - Skin intact throughout, no open wounds  - No swelling  - No ecchymosis, erythema, or signs of cellulitis  Palpation  - NonTTP throughout, no palpable abnormality   Range of motion  - AROM and PROM of the hip, knee, ankle, and foot intact  Stability  - No evidence of joint dislocation or abnormal laxity,   Neurovascular  - TA/EHL/Gastroc/FHL assessed in isolation without deficit  - SILT throughout  - Compartments soft  - DP 2+   - Negative Log roll  - Negative Stinchfield  - Muscle tone normal    Left Lower Extremity  Inspection  - Skin intact throughout, no open wounds  - No swelling  - No ecchymosis, erythema, or signs of cellulitis  Palpation  - TTP at the lateral and anterior hip  Range of motion  - AROM and PROM of the ankle, and foot intact  - Limited ROM of the knee and hip due to pain  Neurovascular  - SILT throughout  - Compartments soft  - DP 2+   - positive Log roll  - positive Stinchfield  - Muscle tone normal        Significant Labs: All pertinent labs within the past 24 hours have been reviewed.    Significant Imaging: I have reviewed and interpreted all pertinent imaging results/findings.  Assessment/Plan:     * Left hip pain  Hannah Montoya is a 69 y.o. female presenting with 1 day history of left hip pain after a twisting motion last night. She is 1 month s/p CMN of left femur for pathologic intertrochanteric fracture. Imaging revealed stable implant without signs of hardware " loosening.     Plan:  - WBAT LLE with RW  - DVT Prophylaxis: Pt on eliquis  - Pain control: multimodal pain management             Thank you for your consult.     Darci Latham MD  Orthopedics  [facility] - Emergency Dept

## 2024-09-23 NOTE — HPI
Hannah Montoya is a 69 y.o. female with Pmhx of Metastatic lung cancer & Left pathologic IT fracture s/p IMN on 08/20 presenting with left hip pain after a twisting motion last night. She was able to bear weight after but was painful. Sh ehas been doing well post-op and had senait able to ambulate with RW. Patient denies numbness and tingling. Denies any other musculoskeletal pain or injuries. She is on eliquis.

## 2024-09-23 NOTE — ED PROVIDER NOTES
"Encounter Date: 9/23/2024       History     Chief Complaint   Patient presents with    left hip pain     Left hip pain home medication not helping after surgery was done here 3 weeks ago     69-year-old female with a hx of HTN, dm type 2, left bundle branch block, CRT-D placement, breast cancer, NSCLC of RLL, obesity, depression, dilated cardiomyopathy, nonischemic cardiomyopathy, COPD, HLD, recently admitted for Left hip pain found to have osteolytic lesion and underwent intermedullary sade placement, now presenting with pain of hip.  Patient reports that she has been taking Oxy at home for pain but today when she stood up she believes she "dislodged" something in her leg causing her to have severe pain in her hip.  Patient additionally tells me that she believes her affected leg is slightly swollen.  Patient denies any trauma to her leg, fevers, or discharge or pain at the surgical site.    The history is provided by the patient and a relative.     Review of patient's allergies indicates:   Allergen Reactions    Taxol [paclitaxel]      Hypersensitivity reaction to taxol, symptoms included shortness of breath, nausea, dizziness, flushing     Carboplatin Other (See Comments)     Itching and hives    Adhesive Rash     tegaderm burns and blistered skin     Past Medical History:   Diagnosis Date    AICD (automatic cardioverter/defibrillator) present     Asthma     bronchitis in past    Breast cancer 2016    right    Cardiac pacemaker     Cardiomyopathy     COPD (chronic obstructive pulmonary disease)     Diabetes mellitus, type 2     Hyperglycemia     Hyperlipidemia     Hypertension     Malignant neoplasm of overlapping sites of female breast 2/12/2016    Nuclear sclerosis of both eyes 8/12/2020    Respiratory distress 3/12/2020     Past Surgical History:   Procedure Laterality Date    BIOPSY, WITH CT GUIDANCE Right 1/24/2023    Procedure: LUNG MASS BIOPSY, WITH CT GUIDANCE;  Surgeon: Yehuda Green MD;  Location: " [facility] CATH LAB;  Service: Radiology;  Laterality: Right;    BREAST BIOPSY Right 2016    IDC    BREAST LUMPECTOMY Right     CARDIAC CATHETERIZATION Bilateral 2017    CARDIAC DEFIBRILLATOR PLACEMENT Left 08/10/2017    CARDIAC DEFIBRILLATOR PLACEMENT Left 2018     SECTION      x2    CHOLECYSTECTOMY      FEMUR BIOPSY Left 2024    Procedure: BIOPSY, FEMUR;  Surgeon: Porter Campos MD;  Location: [address];  Service: Orthopedics;  Laterality: Left;    INSERTION OF TUNNELED CENTRAL VENOUS CATHETER (CVC) WITH SUBCUTANEOUS PORT Right 2020    Procedure: OKEDBUYAN-GDCK-Z-CATH, RIGHT;  Surgeon: Josefa Caceres MD;  Location: [address];  Service: General;  Laterality: Right;  Port-a-cath placed to R. IJ    INTRAMEDULLARY RODDING OF FEMUR Left 2024    Procedure: INSERTION, INTRAMEDULLARY ANTOINE, FEMUR;  Surgeon: Porter Campos MD;  Location: [address];  Service: Orthopedics;  Laterality: Left;    LUNG BIOPSY N/A 2020    Procedure: BIOPSY, LUNG;  Surgeon: [facility] Diagnostic Provider;  Location: [facility] OR;  Service: Radiology;  Laterality: N/A;  8AM START  RN PREOP 2020---COVID NEGATIVE    NAVIGATIONAL BRONCHOSCOPY N/A 10/13/2020    Procedure: BRONCHOSCOPY, NAVIGATIONAL;  Surgeon: Jamilah Weaver MD;  Location: [address];  Service: Pulmonary;  Laterality: N/A;    REVISION OF IMPLANTABLE CARDIOVERTER-DEFIBRILLATOR (ICD) ELECTRODE LEAD PLACEMENT N/A 6/15/2018    Procedure: REVISION-LEAD-ICD;  Surgeon: Javy Gates MD;  Location: [facility] CATH LAB;  Service: Cardiology;  Laterality: N/A;  LBBB, Bi-V ICD HIS Elmo jordan, MDT, Choice, MB, 3 Prep    ROBOT-ASSISTED LAPAROSCOPIC LYMPHADENECTOMY USING DA SALVADOR XI Right 10/23/2020    Procedure: XI ROBOTIC LYMPHADENECTOMY;  Surgeon: Ramo Tucker MD;  Location: [address];  Service: Thoracic;  Laterality: Right;    TUBAL LIGATION       Family History   Problem Relation Name Age of Onset    Kidney  disease Mother      Cataracts Mother      Kidney disease Father      Cataracts Father      Clotting disorder Brother      No Known Problems Daughter      No Known Problems Son      No Known Problems Sister      No Known Problems Maternal Aunt      No Known Problems Maternal Uncle      No Known Problems Paternal Aunt      No Known Problems Paternal Uncle      Macular degeneration Maternal Grandmother      No Known Problems Maternal Grandfather      No Known Problems Paternal Grandmother      No Known Problems Paternal Grandfather      Breast cancer Neg Hx      Ovarian cancer Neg Hx      Amblyopia Neg Hx      Blindness Neg Hx      Cancer Neg Hx      Diabetes Neg Hx      Glaucoma Neg Hx      Hypertension Neg Hx      Retinal detachment Neg Hx      Strabismus Neg Hx      Stroke Neg Hx      Thyroid disease Neg Hx       Social History     Tobacco Use    Smoking status: Former     Current packs/day: 0.00     Average packs/day: 0.5 packs/day for 40.0 years (20.0 ttl pk-yrs)     Types: Cigarettes     Start date: 1976     Quit date: 2016     Years since quittin.1     Passive exposure: Past    Smokeless tobacco: Never   Substance Use Topics    Alcohol use: Yes     Alcohol/week: 1.0 standard drink of alcohol     Types: 1 Glasses of wine per week     Comment: rare- holiday    Drug use: No     Review of Systems    Physical Exam     Initial Vitals [24 0317]   BP Pulse Resp Temp SpO2   (!) 178/88 97 20 98.3 °F (36.8 °C) 97 %      MAP       --         Physical Exam    Nursing note and vitals reviewed.      Gen: AxOx3, well nourished, appears stated age, no pallor, no jaundice, appears well hydrated  Eye: EOMI, no scleral icterus, no periorbital edema or ecchymosis  Head: Normocephalic, atraumatic, no lesions, scalp appears normal  ENT: Neck supple, no stridor, no masses, no drooling or voice changes  CVS: All distal pulses intact with normal rate and rhythm, no JVD, normal S1/S2, no murmur  Pulm: Normal breath  sounds, no wheezes, rales or rhonchi, no increased work of breathing  Abd:  Nondistended, soft, nontender, no organomegaly, no CVAT  Ext:  Left LE:  - Skin intact throughout, well-appearing surgical scar over left hip without erythema or purulent drainage  - 1+ pitting edema of entire extremity enlarged from right  - No ecchymosis, erythema, or signs of cellulitis  - Tenderness to palpation over proximal femur  - No tenderness to palpation of middle or distal femur  - No tenderness to palpation of proximal, middle, or distal aspects of tibia or fibula  - No tenderness to palpation of foot  - 2+ DP  - Brisk capillary refill   - Pain with any ROM at hip  - Full painless ROM of knee and ankle  - Compartments semi tense  - Sensation intact over entire extremity  - Motor intact EHL/FHL/TA/Gastroc  Neuro: GCS15, moving all extremities, gait intact, face grossly symmetric  Psych: normal affect, cooperative, well groomed, makes good eye contact      ED Course   Procedures  Labs Reviewed   CBC W/ AUTO DIFFERENTIAL - Abnormal       Result Value    WBC 6.32      RBC 3.68 (*)     Hemoglobin 8.1 (*)     Hematocrit 28.3 (*)     MCV 77 (*)     MCH 22.0 (*)     MCHC 28.6 (*)     RDW 19.1 (*)     Platelets 252      MPV 11.3      Immature Granulocytes 2.1 (*)     Gran # (ANC) 4.9      Immature Grans (Abs) 0.13 (*)     Lymph # 0.4 (*)     Mono # 0.8      Eos # 0.0      Baso # 0.06      nRBC 0      Gran % 77.0 (*)     Lymph % 6.5 (*)     Mono % 13.0      Eosinophil % 0.5      Basophil % 0.9      Differential Method Automated     COMPREHENSIVE METABOLIC PANEL - Abnormal    Sodium 134 (*)     Potassium 4.4      Chloride 99      CO2 22 (*)     Glucose 201 (*)     BUN 21      Creatinine 0.9      Calcium 10.7 (*)     Total Protein 6.9      Albumin 2.4 (*)     Total Bilirubin 0.3      Alkaline Phosphatase 76      AST 15      ALT 8 (*)     eGFR >60.0      Anion Gap 13            Imaging Results              US Lower Extremity Veins Left  (Final result)  Result time 09/23/24 05:25:32      Final result by Munir Limon MD (09/23/24 05:25:32)                   Impression:      No evidence of deep venous thrombosis in the visualized left lower extremity.  Left popliteal vein not visualized, as above.    Electronically signed by resident: Abhinav Reyes  Date:    09/23/2024  Time:    05:17    Electronically signed by: Munir Limon MD  Date:    09/23/2024  Time:    05:25               Narrative:    EXAMINATION:  US LOWER EXTREMITY VEINS LEFT    CLINICAL HISTORY:  Other specified soft tissue disorders    TECHNIQUE:  Duplex and color flow Doppler evaluation and graded compression of the left lower extremity veins was performed.    COMPARISON:  No relevant priors.    FINDINGS:  Left thigh veins: The common femoral, femoral, and upper greater saphenous veins are patent and free of thrombus. The veins are normally compressible and have normal phasic flow and augmentation response.  Note made that the left popliteal vein was unable to be visualized due to patient inability to move the leg.    Left calf veins: The visualized calf veins are patent.    Contralateral CFV: The contralateral (right) common femoral vein is patent and free of thrombus.    Miscellaneous: None                                       X-Ray Femur Ap/Lat Left (In process)                      X-Ray Pelvis Routine AP (In process)                      Medications   morphine injection 4 mg (4 mg Intravenous Given 9/23/24 0415)     Medical Decision Making  Initial assessment  69-year-old female with a hx of HTN, dm type 2, left bundle branch block, CRT-D placement, breast cancer, NSCLC of RLL, obesity, depression, dilated cardiomyopathy, nonischemic cardiomyopathy, COPD, HLD, recently admitted for Left hip pain found to have osteolytic lesion and underwent intermedullary sade placement, now presenting with pain of hip. Patient is able to vocalise, breathing spontaneously, hemodynamically  stable, oriented, moving all 4 limbs spontaneously.  Examination consistent with tenderness to palpation of left hip with decreased range of motion and semi swollen appearance.      Differential diagnosis  DVT  Displacement of sade  Increased fracture      ED management  Patient had breakthrough pain with concern for dislodgement of hardware in her leg.  Examination concerning for tenderness to palpation of affected hip.  Given this I decided to obtain workup for hip hardware patency in addition to DVT workup.  CBC and CMP within patient's baseline.  Ultrasound shows compressible veins of left leg without concern for DVT.  X-ray concerning for fracture of lesser trochanter which was not present on recent admission.  Patient was discussed with orthopedics.  Patient signed out to oncoming team pending recommendations from Orthopedics.         Amount and/or Complexity of Data Reviewed  Labs: ordered. Decision-making details documented in ED Course.  Radiology: ordered.    Risk  Prescription drug management.               ED Course as of 09/23/24 0614   Mon Sep 23, 2024   0344 BP(!): 178/88 [PM]   0344 Temp: 98.3 °F (36.8 °C) [PM]   0344 Pulse: 97 [PM]   0344 Resp: 20 [PM]   0344 SpO2: 97 % [PM]   0500 WBC: 6.32 [PM]   0501 Hemoglobin(!): 8.1 [PM]   0501 Platelet Count: 252 [PM]   0516 Sodium(!): 134 [PM]   0516 Potassium: 4.4 [PM]   0516 BUN: 21 [PM]   0516 Creatinine: 0.9 [PM]      ED Course User Index  [PM] Venus Camacho MD                    Clinical Impression:  Final diagnoses:  [M79.89] Leg swelling  [M25.559] Hip pain                   Venus Camacho MD  Resident  09/23/24 0614

## 2024-09-23 NOTE — PROVIDER PROGRESS NOTES - EMERGENCY DEPT.
Encounter Date: 9/23/2024    ED Physician Progress Notes        Physician Note:   ED RESIDENT  PHYSICIAN HAND-OFF NOTE:  6:55 AM 9/23/2024  Hannah Montoya is a 69 y.o. female who presented to the ED on 9/23/2024 and has been managed by  and Janice, who reports patient C/O Left hip pain. I assumed care of patient from off-going ED physician team at 6:55 AM pending Orthopedic surgery recommendations.    On my evaluation, Hannah Montoya appears well, hemodynamically stable and in NAD, pain is 3/10. Thus far, Hannah Montoya has received:  Medications  morphine injection 4 mg (4 mg Intravenous Given 9/23/24 0415)    On my exam, I appreciate:  BP (!) 178/88   Pulse 97   Temp 98.3 °F (36.8 °C) (Oral)   Resp 18   LMP  (LMP Unknown)   SpO2 97%   Constitutional: Well-appearing; Well-Nourished; Non-Toxic-appearing and in NAD.  Head/Face: AT/NC & No Facial asymmetry.  Oropharynx: Speaking Full Sentences with No drooling or slurring of speech.  Cardiovascular: Reg Rate; Reg Rhythm; No Murmurs.  Pulmonary/Chest: AT Thorax with Lungs CTA B/L.  Abdominal: Soft, ND, NT.  Musculoskeletal: pain with PROM of left hip  Neuro/Psych: Calm; Cooperative, Following Command, Answering Questions Appropriately, and No Gait/Balance deficits.    ED Course as of 09/23/24 0655  ------------------------------------------------------------  Time: 09/23 0344  Value: BP(!): 178/88  Comment: (Reviewed)  By: Venus Camacho MD  ------------------------------------------------------------  Time: 09/23 0344  Value: Temp: 98.3 °F (36.8 °C)  Comment: (Reviewed)  By: Venus Camacho MD  ------------------------------------------------------------  Time: 09/23 0344  Value: Pulse: 97  Comment: (Reviewed)  By: Venus Camacho MD  ------------------------------------------------------------  Time: 09/23 0344  Value: Resp: 20  Comment: (Reviewed)  By: Venus Camacho,  MD  ------------------------------------------------------------  Time: 09/23 0344  Value: SpO2: 97 %  Comment: (Reviewed)  By: Venus Camacho MD  ------------------------------------------------------------  Time: 09/23 0500  Value: WBC: 6.32  Comment: (Reviewed)  By: Venus Camacho MD  ------------------------------------------------------------  Time: 09/23 0501  Value: Hemoglobin(!): 8.1  Comment: (Reviewed)  By: Venus Camacho MD  ------------------------------------------------------------  Time: 09/23 0501  Value: Platelet Count: 252  Comment: (Reviewed)  By: Venus Camacho MD  ------------------------------------------------------------  Time: 09/23 0516  Value: Sodium(!): 134  Comment: (Reviewed)  By: Venus Camacho MD  ------------------------------------------------------------  Time: 09/23 0516  Value: Potassium: 4.4  Comment: (Reviewed)  By: Venus Camacho MD  ------------------------------------------------------------  Time: 09/23 0516  Value: BUN: 21  Comment: (Reviewed)  By: Venus Camacho MD  ------------------------------------------------------------  Time: 09/23 0516  Value: Creatinine: 0.9  Comment: (Reviewed)  By: Venus Camacho MD     We discussed the case with Orthopedics surgery and their recommendations are for weight-bearing as tolerated, pain control and to follow up in an outpatient basis.  Disposition: I anticipate patient will be discharged to home.  I have discussed and counseled Hannah Montoya regarding exam, results, diagnosis, treatment, and plan.  ______________________  Orlando Anderson DO  Emergency Medicine [id number]  6:55 AM 9/23/2024

## 2024-09-23 NOTE — DISCHARGE INSTRUCTIONS
You were seen in the emergency department left hip pain.  Please continue to take your medications as prescribed.  Please return in the emergency department if symptoms worsen.  Please weightbear as tolerated. Follow up with orthopedic surgery.

## 2024-09-23 NOTE — ASSESSMENT & PLAN NOTE
Hannah Montoya is a 69 y.o. female presenting with 1 day history of left hip pain after a twisting motion last night. She is 1 month s/p CMN of left femur for pathologic intertrochanteric fracture. Imaging revealed stable implant without signs of hardware loosening.     Plan:  - WBAT LLE with RW  - DVT Prophylaxis: Pt on eliquis  - Pain control: multimodal pain management

## 2024-09-23 NOTE — SUBJECTIVE & OBJECTIVE
Past Medical History:   Diagnosis Date    AICD (automatic cardioverter/defibrillator) present     Asthma     bronchitis in past    Breast cancer 2016    right    Cardiac pacemaker     Cardiomyopathy     COPD (chronic obstructive pulmonary disease)     Diabetes mellitus, type 2     Hyperglycemia     Hyperlipidemia     Hypertension     Malignant neoplasm of overlapping sites of female breast 2016    Nuclear sclerosis of both eyes 2020    Respiratory distress 3/12/2020       Past Surgical History:   Procedure Laterality Date    BIOPSY, WITH CT GUIDANCE Right 2023    Procedure: LUNG MASS BIOPSY, WITH CT GUIDANCE;  Surgeon: Yehuda Green MD;  Location: [facility] CATH LAB;  Service: Radiology;  Laterality: Right;    BREAST BIOPSY Right 2016    IDC    BREAST LUMPECTOMY Right     CARDIAC CATHETERIZATION Bilateral 2017    CARDIAC DEFIBRILLATOR PLACEMENT Left 08/10/2017    CARDIAC DEFIBRILLATOR PLACEMENT Left 2018     SECTION      x2    CHOLECYSTECTOMY      FEMUR BIOPSY Left 2024    Procedure: BIOPSY, FEMUR;  Surgeon: Porter Campos MD;  Location: [facility] OR [address] [place] [address];  Service: Orthopedics;  Laterality: Left;    INSERTION OF TUNNELED CENTRAL VENOUS CATHETER (CVC) WITH SUBCUTANEOUS PORT Right 2020    Procedure: [id number]-CATH, RIGHT;  Surgeon: Josefa Caceres MD;  [facility];  Service: General;  Laterality: Right;  Port-a-cath placed to R. IJ    INTRAMEDULLARY RODDING OF FEMUR Left 2024    Procedure: INSERTION, INTRAMEDULLARY ANTOINE, FEMUR;  Surgeon: Porter Campos MD;  [facility];  Service: Orthopedics;  Laterality: Left;    LUNG BIOPSY N/A 2020    Procedure: BIOPSY, LUNG;  Surgeon: [facility] Diagnostic Provider;  Location: [facility] OR;  Service: Radiology;  Laterality: N/A;  8AM START  RN PREOP 2020---COVID NEGATIVE    NAVIGATIONAL BRONCHOSCOPY N/A 10/13/2020    Procedure: BRONCHOSCOPY, NAVIGATIONAL;  Surgeon:  Jamilah Weaver MD;  [facility];  Service: Pulmonary;  Laterality: N/A;    REVISION OF IMPLANTABLE CARDIOVERTER-DEFIBRILLATOR (ICD) ELECTRODE LEAD PLACEMENT N/A 6/15/2018    Procedure: REVISION-LEAD-ICD;  Surgeon: Javy Gates MD;  Location: [facility] CATH LAB;  Service: Cardiology;  Laterality: N/A;  LBBB, Bi-V ICD HIS Ld rev, MDT, Choice, MB, 3 Prep    ROBOT-ASSISTED LAPAROSCOPIC LYMPHADENECTOMY USING DA SALVADOR XI Right 10/23/2020    Procedure: XI ROBOTIC LYMPHADENECTOMY;  Surgeon: Ramo Tucker MD;  [facility];  Service: Thoracic;  Laterality: Right;    TUBAL LIGATION         Review of patient's allergies indicates:   Allergen Reactions    Taxol [paclitaxel]      Hypersensitivity reaction to taxol, symptoms included shortness of breath, nausea, dizziness, flushing     Carboplatin Other (See Comments)     Itching and hives    Adhesive Rash     tegaderm burns and blistered skin       No current facility-administered medications for this encounter.     Current Outpatient Medications   Medication Sig    ACCU-CHEK ALFRED PLUS TEST STRP Strp USE TO TEST THREE TIMES DAILY    acetaminophen (TYLENOL) 500 MG tablet Take 2 tablets (1,000 mg total) by mouth every 8 (eight) hours as needed for Pain.    albuterol (ACCUNEB) 1.25 mg/3 mL Nebu use 1 AMPULE (3ml) via NEBULIZER EVERY 6 HOURS AS NEEDED    albuterol (VENTOLIN HFA) 90 mcg/actuation inhaler Inhale 2 puffs into the lungs every 4 (four) hours as needed for Wheezing or Shortness of Breath. Rescue    amLODIPine (NORVASC) 5 MG tablet TAKE 1 TABLET BY MOUTH EVERY DAY    atorvastatin (LIPITOR) 10 MG tablet TAKE 1 TABLET BY MOUTH EVERY DAY    benzonatate (TESSALON) 200 MG capsule Take 1 capsule (200 mg total) by mouth 3 (three) times daily as needed for Cough.    buPROPion (WELLBUTRIN XL) 150 MG TB24 tablet TAKE 1 TABLET BY MOUTH EVERY DAY    cetirizine (ZYRTEC) 10 MG tablet Take 10 mg by mouth every evening.     cholecalciferol, vitamin D3,  (VITAMIN D3) 50 mcg (2,000 unit) Tab Take 1 tablet (2,000 Units total) by mouth once daily.    gabapentin (NEURONTIN) 300 MG capsule Take 1 capsule (300 mg total) by mouth 3 (three) times daily.    losartan (COZAAR) 100 MG tablet TAKE 1 TABLET BY MOUTH EVERY DAY    meloxicam (MOBIC) 7.5 MG tablet Take 1 tablet (7.5 mg total) by mouth once daily.    metFORMIN (GLUCOPHAGE) 500 MG tablet TAKE 2 TABLETS BY MOUTH TWICE A DAY WITH MEALS    metoprolol succinate (TOPROL-XL) 100 MG 24 hr tablet TAKE 1 TABLET BY MOUTH EVERY DAY    multivitamin (THERAGRAN) per tablet Take 1 tablet by mouth once daily. (Patient not taking: Reported on 9/12/2024)    nystatin (MYCOSTATIN) powder Apply topically 2 (two) times daily.    ondansetron (ZOFRAN-ODT) 8 MG TbDL Take 1 tablet (8 mg total) by mouth every 8 (eight) hours as needed (nausea/vomiting). Take 1 tablet (8 mg) by mouth every 8 hours as needed for nausea/vomiting.    oxyCODONE (ROXICODONE) 5 MG immediate release tablet Take 1 tablet (5 mg total) by mouth every 6 (six) hours as needed for Pain.    palonosetron (ALOXI) 0.25 mg/5 mL injection  (Patient not taking: Reported on 9/12/2024)    pantoprazole (PROTONIX) 40 MG tablet TAKE 1 TABLET BY MOUTH EVERY DAY    READI-CAT 2 2 % (w/v) suspension     READI-CAT 2 2.1 % (w/v), 2.0 % (w/w) suspension     sertraline (ZOLOFT) 100 MG tablet TAKE 1 TABLET BY MOUTH EVERY DAY IN THE EVENING    tiotropium (SPIRIVA WITH HANDIHALER) 18 mcg inhalation capsule INHALE THE CONTENTS OF 1 CAPSULE BY MOUTH EVERY DAY    WIXELA INHUB 250-50 mcg/dose diskus inhaler INHALE 1 PUFF INTO THE LUNGS 2 (TWO) TIMES DAILY. CONTROLLER     Facility-Administered Medications Ordered in Other Encounters   Medication    fentaNYL injection 25 mcg    haloperidol lactate injection 0.5 mg    HYDROmorphone injection 0.2 mg    ondansetron injection 4 mg    sodium chloride 0.9% flush 10 mL     Family History       Problem Relation (Age of Onset)    Cataracts Mother, Father     Clotting disorder Brother    Kidney disease Mother, Father    Macular degeneration Maternal Grandmother    No Known Problems Daughter, Son, Sister, Maternal Aunt, Maternal Uncle, Paternal Aunt, Paternal Uncle, Maternal Grandfather, Paternal Grandmother, Paternal Grandfather          Tobacco Use    Smoking status: Former     Current packs/day: 0.00     Average packs/day: 0.5 packs/day for 40.0 years (20.0 ttl pk-yrs)     Types: Cigarettes     Start date: 1976     Quit date: 2016     Years since quittin.1     Passive exposure: Past    Smokeless tobacco: Never   Substance and Sexual Activity    Alcohol use: Yes     Alcohol/week: 1.0 standard drink of alcohol     Types: 1 Glasses of wine per week     Comment: rare- holiday    Drug use: No    Sexual activity: Not Currently     Partners: Male     Comment:      ROS  Constitutional: negative for fevers  Eyes: negative visual changes  ENT: negative for hearing loss  Respiratory: negative for dyspnea  Cardiovascular: negative for chest pain  Gastrointestinal: negative for abdominal pain  Genitourinary: negative for dysuria  Neurological: negative for headaches  Behavioral/Psych: negative for hallucinations  Endocrine: negative for temperature intolerance    Objective:     Vital Signs (Most Recent):  Temp: 98.3 °F (36.8 °C) (24)  Pulse: 97 (24)  Resp: 18 (24 0415)  BP: (!) 178/88 (24)  SpO2: 97 % (24) Vital Signs (24h Range):  Temp:  [98.3 °F (36.8 °C)] 98.3 °F (36.8 °C)  Pulse:  [97] 97  Resp:  [18-20] 18  SpO2:  [97 %] 97 %  BP: (178)/(88) 178/88           There is no height or weight on file to calculate BMI.    No intake or output data in the 24 hours ending 24 0529     Ortho/SPM Exam   Gen:  No acute distress, well-developed, well nourished.  CV:  Peripherally well-perfused. 2+ radial pulses, symmetric.  Respiratory:  Normal respiratory effort. No accessory muscle use.   Head/Neck:  Normocephalic.   "Atraumatic. Sclera anicteric. TM. Neck supple.  Neuro: No FND. Awake. Alert. Oriented to person, place, time, and situation.  Abdomen: Soft, NTND.      MSK:  Right Upper Extremity  Inspection  - Skin intact throughout, no open wounds  - No swelling  - No ecchymosis, erythema, or signs of cellulitis  Palpation  - NonTTP throughout, no palpable abnormality   Range of motion  - AROM and PROM of the shoulder, elbow, wrist, and hand intact  Stability  - No evidence of joint dislocation or abnormal laxity   Neurovascular  - AIN/PIN/Radial/Median/Ulnar Nerves assessed in isolation without deficit  - Able to give thumbs up, make "OK" sign, cross IF/LF, abduct/adduct fingers, make fist  - SILT throughout  - Compartments soft  - Radial artery 2+  - Muscle tone normal    Left Upper Extremity  Inspection  - Skin intact throughout, no open wounds  - No swelling  - No ecchymosis, erythema, or signs of cellulitis  Palpation  - NonTTP throughout, no palpable abnormality   Range of motion  - AROM and PROM of the shoulder, elbow, wrist, and hand intact  Stability  - No evidence of joint dislocation or abnormal laxity  Neurovascular  - AIN/PIN/Radial/Median/Ulnar Nerves assessed in isolation without deficit  - Able to give thumbs up, make "OK" sign, cross IF/LF, abduct/adduct fingers, make fist  - SILT throughout  - Compartments soft  - Radial artery 2+  - Muscle tone normal      Right Lower Extremity  Inspection  - Skin intact throughout, no open wounds  - No swelling  - No ecchymosis, erythema, or signs of cellulitis  Palpation  - NonTTP throughout, no palpable abnormality   Range of motion  - AROM and PROM of the hip, knee, ankle, and foot intact  Stability  - No evidence of joint dislocation or abnormal laxity,   Neurovascular  - TA/EHL/Gastroc/FHL assessed in isolation without deficit  - SILT throughout  - Compartments soft  - DP 2+   - Negative Log roll  - Negative Stinchfield  - Muscle tone normal    Left Lower " Extremity  Inspection  - Skin intact throughout, no open wounds  - No swelling  - No ecchymosis, erythema, or signs of cellulitis  Palpation  - TTP at the lateral and anterior hip  Range of motion  - AROM and PROM of the ankle, and foot intact  - Limited ROM of the knee and hip due to pain  Neurovascular  - SILT throughout  - Compartments soft  - DP 2+   - positive Log roll  - positive Stinchfield  - Muscle tone normal        Significant Labs: All pertinent labs within the past 24 hours have been reviewed.    Significant Imaging: I have reviewed and interpreted all pertinent imaging results/findings.

## 2024-09-23 NOTE — ED TRIAGE NOTES
Pt arrives via EMS after have on going pain in her surgical leg. Pt states that pain became uncontrollable tonight. Hx of lung CA, pacemaker, and COPD.

## 2024-09-23 NOTE — ED NOTES
Patient identifiers verified and correct for Hannah Montoya  LOC: The patient is awake, alert and aware of environment with an appropriate affect, the patient is oriented x 3 and speaking appropriately.   APPEARANCE: Patient appears uncomfortable but in no acute distress, patient is clean and well groomed.  SKIN: The skin is warm and dry, color consistent with ethnicity, patient has normal skin turgor and moist mucus membranes, skin intact, no breakdown or bruising noted.   MUSCULOSKELETAL: Patient moving all extremities spontaneously, no swelling noted. Pt has pain to left hip  RESPIRATORY: Airway is open and patent, respirations are spontaneous, patient has a normal effort and rate, no accessory muscle use noted, pt placed on continuous pulse ox with O2 sats noted at 97% on room air.  CARDIAC: Pt placed on cardiac monitor. Patient has a normal rate and regular rhythm, no edema noted, capillary refill < 3 seconds.   GASTRO: Soft and non tender to palpation, no distention noted, normoactive bowel sounds present in all four quadrants. Pt states bowel movements have been regular.  : Pt denies any pain or frequency with urination.  NEURO: Pt opens eyes spontaneously, behavior appropriate to situation, follows commands, facial expression symmetrical, bilateral hand grasp equal and even, purposeful motor response noted, normal sensation in all extremities when touched with a finger.

## 2024-09-24 ENCOUNTER — OFFICE VISIT (OUTPATIENT)
Dept: ORTHOPEDICS | Facility: CLINIC | Age: 69
End: 2024-09-24
Payer: MEDICARE

## 2024-09-24 VITALS — WEIGHT: 181.19 LBS | BODY MASS INDEX: 33.34 KG/M2 | HEIGHT: 62 IN

## 2024-09-24 DIAGNOSIS — Z98.890 POST-OPERATIVE STATE: ICD-10-CM

## 2024-09-24 DIAGNOSIS — M84.452D: Primary | ICD-10-CM

## 2024-09-24 DIAGNOSIS — G89.3 CANCER ASSOCIATED PAIN: ICD-10-CM

## 2024-09-24 PROCEDURE — 1101F PT FALLS ASSESS-DOCD LE1/YR: CPT | Mod: HCNC,CPTII,S$GLB, | Performed by: NURSE PRACTITIONER

## 2024-09-24 PROCEDURE — 99999 PR PBB SHADOW E&M-EST. PATIENT-LVL IV: CPT | Mod: PBBFAC,HCNC,, | Performed by: NURSE PRACTITIONER

## 2024-09-24 PROCEDURE — 3044F HG A1C LEVEL LT 7.0%: CPT | Mod: HCNC,CPTII,S$GLB, | Performed by: NURSE PRACTITIONER

## 2024-09-24 PROCEDURE — 3072F LOW RISK FOR RETINOPATHY: CPT | Mod: HCNC,CPTII,S$GLB, | Performed by: NURSE PRACTITIONER

## 2024-09-24 PROCEDURE — 1125F AMNT PAIN NOTED PAIN PRSNT: CPT | Mod: HCNC,CPTII,S$GLB, | Performed by: NURSE PRACTITIONER

## 2024-09-24 PROCEDURE — 1160F RVW MEDS BY RX/DR IN RCRD: CPT | Mod: HCNC,CPTII,S$GLB, | Performed by: NURSE PRACTITIONER

## 2024-09-24 PROCEDURE — 4010F ACE/ARB THERAPY RXD/TAKEN: CPT | Mod: HCNC,CPTII,S$GLB, | Performed by: NURSE PRACTITIONER

## 2024-09-24 PROCEDURE — 3288F FALL RISK ASSESSMENT DOCD: CPT | Mod: HCNC,CPTII,S$GLB, | Performed by: NURSE PRACTITIONER

## 2024-09-24 PROCEDURE — 1159F MED LIST DOCD IN RCRD: CPT | Mod: HCNC,CPTII,S$GLB, | Performed by: NURSE PRACTITIONER

## 2024-09-24 PROCEDURE — 1157F ADVNC CARE PLAN IN RCRD: CPT | Mod: HCNC,CPTII,S$GLB, | Performed by: NURSE PRACTITIONER

## 2024-09-24 PROCEDURE — 99024 POSTOP FOLLOW-UP VISIT: CPT | Mod: HCNC,S$GLB,, | Performed by: NURSE PRACTITIONER

## 2024-09-24 RX ORDER — METHOCARBAMOL 750 MG/1
750 TABLET, FILM COATED ORAL 4 TIMES DAILY
Qty: 40 TABLET | Refills: 0 | Status: SHIPPED | OUTPATIENT
Start: 2024-09-24 | End: 2024-10-04

## 2024-09-24 NOTE — PROGRESS NOTES
Ms. Montoya is here today for a post-operative visit after undergoing the following:    Open reduction internal fixation left intertrochanteric hip fracture with intramedullary nail  Open bone biopsy left proximal femur     by Dr. Campos on 8/20/2024.    Interval History:  She reports that she is doing started to have increased pain to her hip that would radiate towards her buttocks.  She states the pain got so severe she had to go to the ED.  She had xray of her left femur and pelvis and was seen by Ortho.  Per chart review, her xrays were unremarkable and it was recommended to continue to weight bear as tolerated and ROMAT.  She was given OxyIR 5 mg PRN and told to keep appointment in the Ortho Clinic.      She reports her pain has gotten so bad she had difficulty getting up out of the bed.  It would worsen with walking.  She is currently using a walker and getting home health.  She reports just 2 weeks ago, she was progressing well and not sure what has happened.      She denies any falls or injuries since surgery/last seen in the clinic.  She denies fever, chills, and sweats since the time of the surgery.     Physical exam:  The patient's incision is open to air and healing.  No signs of infection noted.  She has tactile stimulation to their lower leg, she denies calf pain, there is no leg edema and their pedal pulse is palpable x 2.  She has pain with rotation of the hip and has noticeable muscle weakness with SLR.      RADS:  Pelvis x-ray and left femur x-ray taken 9/23/24 shows the following:    Redemonstrated presumed pathologic fracture proximal femur status post ORIF.  Intramedullary sade and femoral neck screw and 2 distal interlocking screws, as before.  Underlying destructive lesion appears grossly similar to prior study performed 09/03/2024, 15:12 hours.     No evidence of hip dislocation.     No definite acute findings are suggested elsewhere.  Degenerative findings are partially imaged at the hip  and knee joints.  No evidence of radiopaque foreign body.    On 24 she had a NM bone scan, whole body which showed:    There is no scintigraphic evidence of osteoblastic metastatic disease.     Soft tissue uptake noted in the right lung related to the known lung mass with heterogeneous enhancement noted on the sameday CT chest     Postsurgical changes in the left hip joint and left femur.    Assessment:  Post-op visit (4 weeks)    Path report shows followin. AND 2.  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 Pathological fracture of intertrochanteric section of left femur with routine healing, subsequent encounter s/p IM nailing on 24  M84.452D V54.25   2. Post-operative state  Z98.890 V45.89   3. Cancer associated pain  G89.3 338.3     Current care, treatment plan, precautions, activity level/ modifications, limitations, rehabilitation exercises and proposed future treatment were discussed with the patient. We discussed the need to monitor for changes in symptoms and condition and report them to the physician.  Discussed importance of compliance with all appointments and follow up examinations.     I informed the patient that I will address her concerns with the surgeon and get back to her with additional recommendations.      PHYSICAL THERAPY:   - Physical therapy as ordered.  Patient is currently getting home health.  - Weight bearing as tolerated but recommend use of a walker for life  - Range of motion as tolerated.      PAIN MEDICATION:   - Pain medication: refill was not needed, I recommend she try increasing her OxyIR to 10 mg PRN when pain is severe and using OxyIR when pain is moderate.  She will continue using Tylenol OTC PRN.  I will refill her Robaxin.    - Pain medication refill policy provided to patient for review, yes.    - Patient was informed a multi-modal approach is used to treat  their pain.    CHRONIC PAIN LIKELY CANCER ASSOCIATED PAIN  - Patient has a follow up with Oncology to discuss treatment plan of her cancer.    - Advised patient to discuss long-term cancer related pain with Oncology team.       DVT PROPHYLAXIS:   - Eliquis 2.5 mg bid    FRAGILITY:  - Patient has been referred to the fracture clinic.     FOLLOW UP:   - Patient will follow up in the clinic in 4 weeks.  - X-ray of her Pelvis and left femur is needed.    - Future Appointments:   Future Appointments   Date Time Provider Department Center   9/26/2024  9:00 AM Geovanni Alcaraz, PhD [facility] CAN PSY [facility] [place]   9/30/2024  9:30 AM LAB, [facility] LAB [facility]   10/1/2024  9:30 AM Aide Magaña, SOCRATES [facility] HEMONC3 [place]   10/1/2024 10:00 AM NURSE M[place], [facility] CHEMO [facility] CHEMO [place]   10/2/2024  9:45 AM [facility] FRACTURE CARE CLINIC [facility] FRA CAR [facility] [place]   10/2/2024 10:30 AM Corey Ceron NP [facility] ORTHO [facility] [place]   10/3/2024 10:05 AM LAB, [facility] LAB [facility]   10/7/2024  1:00 PM LAP MAMMO1 [facility] MAMMO [place]   10/10/2024 11:00 AM 3PREP, ERVIN [facility] SSCU [facility]   10/17/2024 10:40 AM PACEMAKER, ICD [facility] ARRHPRO [facility]   10/24/2024 10:30 AM Julianne Juarez OD [facility] OPTOMTY [place]   3/19/2025 11:20 AM Emanuel Chavez MD [facility] MED [place]           If there are any questions prior to scheduled follow up, the patient was instructed to contact the office

## 2024-09-25 ENCOUNTER — EXTERNAL HOME HEALTH (OUTPATIENT)
Dept: HOME HEALTH SERVICES | Facility: HOSPITAL | Age: 69
End: 2024-09-25
Payer: MEDICARE

## 2024-09-26 ENCOUNTER — OFFICE VISIT (OUTPATIENT)
Dept: PSYCHIATRY | Facility: CLINIC | Age: 69
End: 2024-09-26
Payer: MEDICARE

## 2024-09-26 DIAGNOSIS — F33.1 MODERATE EPISODE OF RECURRENT MAJOR DEPRESSIVE DISORDER: Primary | ICD-10-CM

## 2024-09-26 DIAGNOSIS — C34.91 NSCLC OF RIGHT LUNG: ICD-10-CM

## 2024-09-26 PROCEDURE — 1157F ADVNC CARE PLAN IN RCRD: CPT | Mod: HCNC,CPTII,95, | Performed by: PSYCHOLOGIST

## 2024-09-26 PROCEDURE — 3072F LOW RISK FOR RETINOPATHY: CPT | Mod: HCNC,CPTII,95, | Performed by: PSYCHOLOGIST

## 2024-09-26 PROCEDURE — 1160F RVW MEDS BY RX/DR IN RCRD: CPT | Mod: HCNC,CPTII,95, | Performed by: PSYCHOLOGIST

## 2024-09-26 PROCEDURE — 3044F HG A1C LEVEL LT 7.0%: CPT | Mod: HCNC,CPTII,95, | Performed by: PSYCHOLOGIST

## 2024-09-26 PROCEDURE — 90791 PSYCH DIAGNOSTIC EVALUATION: CPT | Mod: HCNC,95,, | Performed by: PSYCHOLOGIST

## 2024-09-26 PROCEDURE — 1159F MED LIST DOCD IN RCRD: CPT | Mod: HCNC,CPTII,95, | Performed by: PSYCHOLOGIST

## 2024-09-26 PROCEDURE — 4010F ACE/ARB THERAPY RXD/TAKEN: CPT | Mod: HCNC,CPTII,95, | Performed by: PSYCHOLOGIST

## 2024-09-26 NOTE — LETTER
October 2, 2024        Javi Avian MD  [address]  [place] [address]             [facility] - Psychiatry  [address]  [facility] [address]  Phone: [phone]  Fax: [phone]   Patient: Hannah Montoya   MR Number: [id number]   YOB: 1955   Date of Visit: 9/26/2024       Dear Dr. Avina:    Thank you for referring Hannah Montoya to me for evaluation. Below are the relevant portions of my assessment and plan of care.     Hannah Montoya is a 69 y.o. female referred by Dr. Avina for psychological evaluation and treatment.  Ms. Montoya appears to be coping with significant difficulty with her recent progression of disease/orthopedic surgery. Her depression has increased sharply, but she denies suicidal ideation. Patient may benefit from referral to Psychiatry for medication evaluation/management (no prior psychiatric evaluation). She is interested in CBT to address depression and insomnia and will follow up with me for that purpose. She was encouraged to consider re-connection with palliative care.     If you have questions, please do not hesitate to call me. I look forward to following Hannah along with you.    Sincerely,      Geovanni Alcaraz, PhD           CC  No Recipients

## 2024-09-30 ENCOUNTER — TELEPHONE (OUTPATIENT)
Dept: HEMATOLOGY/ONCOLOGY | Facility: CLINIC | Age: 69
End: 2024-09-30
Payer: MEDICARE

## 2024-09-30 ENCOUNTER — PATIENT MESSAGE (OUTPATIENT)
Dept: HEMATOLOGY/ONCOLOGY | Facility: CLINIC | Age: 69
End: 2024-09-30
Payer: MEDICARE

## 2024-09-30 ENCOUNTER — LAB VISIT (OUTPATIENT)
Dept: LAB | Facility: HOSPITAL | Age: 69
End: 2024-09-30
Attending: INTERNAL MEDICINE
Payer: MEDICARE

## 2024-09-30 ENCOUNTER — PATIENT MESSAGE (OUTPATIENT)
Dept: FAMILY MEDICINE | Facility: CLINIC | Age: 69
End: 2024-09-30
Payer: MEDICARE

## 2024-09-30 ENCOUNTER — TELEPHONE (OUTPATIENT)
Dept: FAMILY MEDICINE | Facility: CLINIC | Age: 69
End: 2024-09-30
Payer: MEDICARE

## 2024-09-30 DIAGNOSIS — R53.83 FATIGUE, UNSPECIFIED TYPE: ICD-10-CM

## 2024-09-30 DIAGNOSIS — M84.452D: ICD-10-CM

## 2024-09-30 DIAGNOSIS — C34.91 NSCLC OF RIGHT LUNG: ICD-10-CM

## 2024-09-30 LAB
ALBUMIN SERPL BCP-MCNC: 2.3 G/DL (ref 3.5–5.2)
ALP SERPL-CCNC: 101 U/L (ref 55–135)
ALT SERPL W/O P-5'-P-CCNC: 15 U/L (ref 10–44)
ANION GAP SERPL CALC-SCNC: 11 MMOL/L (ref 8–16)
AST SERPL-CCNC: 22 U/L (ref 10–40)
BILIRUB SERPL-MCNC: 0.3 MG/DL (ref 0.1–1)
BUN SERPL-MCNC: 27 MG/DL (ref 8–23)
CALCIUM SERPL-MCNC: 9.9 MG/DL (ref 8.7–10.5)
CHLORIDE SERPL-SCNC: 100 MMOL/L (ref 95–110)
CO2 SERPL-SCNC: 23 MMOL/L (ref 23–29)
CREAT SERPL-MCNC: 1.1 MG/DL (ref 0.5–1.4)
ERYTHROCYTE [DISTWIDTH] IN BLOOD BY AUTOMATED COUNT: 19.6 % (ref 11.5–14.5)
EST. GFR  (NO RACE VARIABLE): 55 ML/MIN/1.73 M^2
GLUCOSE SERPL-MCNC: 149 MG/DL (ref 70–110)
HCT VFR BLD AUTO: 31.5 % (ref 37–48.5)
HGB BLD-MCNC: 9.2 G/DL (ref 12–16)
IMM GRANULOCYTES # BLD AUTO: 0.31 K/UL (ref 0–0.04)
MCH RBC QN AUTO: 22.5 PG (ref 27–31)
MCHC RBC AUTO-ENTMCNC: 29.2 G/DL (ref 32–36)
MCV RBC AUTO: 77 FL (ref 82–98)
NEUTROPHILS # BLD AUTO: 5 K/UL (ref 1.8–7.7)
PLATELET # BLD AUTO: 389 K/UL (ref 150–450)
PMV BLD AUTO: 10.5 FL (ref 9.2–12.9)
POTASSIUM SERPL-SCNC: 4.5 MMOL/L (ref 3.5–5.1)
PROT SERPL-MCNC: 6.8 G/DL (ref 6–8.4)
RBC # BLD AUTO: 4.08 M/UL (ref 4–5.4)
SODIUM SERPL-SCNC: 134 MMOL/L (ref 136–145)
T4 FREE SERPL-MCNC: 1.07 NG/DL (ref 0.71–1.51)
TSH SERPL DL<=0.005 MIU/L-ACNC: 5.17 UIU/ML (ref 0.4–4)
WBC # BLD AUTO: 6.91 K/UL (ref 3.9–12.7)

## 2024-09-30 PROCEDURE — 84443 ASSAY THYROID STIM HORMONE: CPT | Mod: HCNC | Performed by: INTERNAL MEDICINE

## 2024-09-30 PROCEDURE — 36415 COLL VENOUS BLD VENIPUNCTURE: CPT | Mod: HCNC | Performed by: INTERNAL MEDICINE

## 2024-09-30 PROCEDURE — 80053 COMPREHEN METABOLIC PANEL: CPT | Mod: HCNC | Performed by: INTERNAL MEDICINE

## 2024-09-30 PROCEDURE — 85027 COMPLETE CBC AUTOMATED: CPT | Mod: HCNC | Performed by: INTERNAL MEDICINE

## 2024-09-30 PROCEDURE — 84439 ASSAY OF FREE THYROXINE: CPT | Mod: HCNC | Performed by: INTERNAL MEDICINE

## 2024-09-30 RX ORDER — OXYCODONE HYDROCHLORIDE 5 MG/1
5 TABLET ORAL EVERY 6 HOURS PRN
Qty: 28 TABLET | Refills: 0 | Status: CANCELLED | OUTPATIENT
Start: 2024-09-30

## 2024-09-30 RX ORDER — METHOCARBAMOL 750 MG/1
750 TABLET, FILM COATED ORAL 4 TIMES DAILY
Qty: 40 TABLET | Refills: 0 | Status: ON HOLD | OUTPATIENT
Start: 2024-09-30 | End: 2024-10-10

## 2024-09-30 NOTE — TELEPHONE ENCOUNTER
No care due was identified.  [facility] Embedded Care Due Messages. Reference number: [id number].   9/30/2024 11:05:53 AM CDT

## 2024-10-01 ENCOUNTER — PATIENT MESSAGE (OUTPATIENT)
Dept: HEMATOLOGY/ONCOLOGY | Facility: CLINIC | Age: 69
End: 2024-10-01
Payer: MEDICARE

## 2024-10-01 ENCOUNTER — HOSPITAL ENCOUNTER (INPATIENT)
Facility: HOSPITAL | Age: 69
LOS: 5 days | Discharge: HOME HEALTH | DRG: 394 | End: 2024-10-07
Attending: EMERGENCY MEDICINE | Admitting: STUDENT IN AN ORGANIZED HEALTH CARE EDUCATION/TRAINING PROGRAM
Payer: MEDICARE

## 2024-10-01 DIAGNOSIS — T45.1X5A ADVERSE EFFECT OF CHEMOTHERAPY, INITIAL ENCOUNTER: ICD-10-CM

## 2024-10-01 DIAGNOSIS — R11.10 VOMITING AND DIARRHEA: ICD-10-CM

## 2024-10-01 DIAGNOSIS — R19.7 VOMITING AND DIARRHEA: ICD-10-CM

## 2024-10-01 DIAGNOSIS — R07.9 CHEST PAIN: ICD-10-CM

## 2024-10-01 DIAGNOSIS — R10.9 ABDOMINAL PAIN, UNSPECIFIED ABDOMINAL LOCATION: ICD-10-CM

## 2024-10-01 DIAGNOSIS — R11.0 NAUSEA: ICD-10-CM

## 2024-10-01 DIAGNOSIS — C34.90 MALIGNANT NEOPLASM OF LUNG, UNSPECIFIED LATERALITY, UNSPECIFIED PART OF LUNG: ICD-10-CM

## 2024-10-01 DIAGNOSIS — K52.9 ACUTE COLITIS: Primary | ICD-10-CM

## 2024-10-01 LAB
ALBUMIN SERPL BCP-MCNC: 2.2 G/DL (ref 3.5–5.2)
ALP SERPL-CCNC: 100 U/L (ref 55–135)
ALT SERPL W/O P-5'-P-CCNC: 14 U/L (ref 10–44)
ANION GAP SERPL CALC-SCNC: 9 MMOL/L (ref 8–16)
AST SERPL-CCNC: 19 U/L (ref 10–40)
BASOPHILS # BLD AUTO: ABNORMAL K/UL (ref 0–0.2)
BASOPHILS NFR BLD: 0 % (ref 0–1.9)
BILIRUB SERPL-MCNC: 0.3 MG/DL (ref 0.1–1)
BUN SERPL-MCNC: 27 MG/DL (ref 8–23)
CALCIUM SERPL-MCNC: 9.5 MG/DL (ref 8.7–10.5)
CHLORIDE SERPL-SCNC: 99 MMOL/L (ref 95–110)
CO2 SERPL-SCNC: 25 MMOL/L (ref 23–29)
CREAT SERPL-MCNC: 1 MG/DL (ref 0.5–1.4)
CTP QC/QA: YES
DIFFERENTIAL METHOD BLD: ABNORMAL
EOSINOPHIL # BLD AUTO: ABNORMAL K/UL (ref 0–0.5)
EOSINOPHIL NFR BLD: 0 % (ref 0–8)
ERYTHROCYTE [DISTWIDTH] IN BLOOD BY AUTOMATED COUNT: 19.9 % (ref 11.5–14.5)
EST. GFR  (NO RACE VARIABLE): >60 ML/MIN/1.73 M^2
GLUCOSE SERPL-MCNC: 152 MG/DL (ref 70–110)
HCT VFR BLD AUTO: 31.1 % (ref 37–48.5)
HGB BLD-MCNC: 9.1 G/DL (ref 12–16)
IMM GRANULOCYTES # BLD AUTO: ABNORMAL K/UL (ref 0–0.04)
IMM GRANULOCYTES NFR BLD AUTO: ABNORMAL % (ref 0–0.5)
LIPASE SERPL-CCNC: 43 U/L (ref 4–60)
LYMPHOCYTES # BLD AUTO: ABNORMAL K/UL (ref 1–4.8)
LYMPHOCYTES NFR BLD: 7 % (ref 18–48)
MCH RBC QN AUTO: 22.5 PG (ref 27–31)
MCHC RBC AUTO-ENTMCNC: 29.3 G/DL (ref 32–36)
MCV RBC AUTO: 77 FL (ref 82–98)
METAMYELOCYTES NFR BLD MANUAL: 2 %
MONOCYTES # BLD AUTO: ABNORMAL K/UL (ref 0.3–1)
MONOCYTES NFR BLD: 14 % (ref 4–15)
NEUTROPHILS # BLD AUTO: ABNORMAL K/UL (ref 1.8–7.7)
NEUTROPHILS NFR BLD: 56 % (ref 38–73)
NEUTS BAND NFR BLD MANUAL: 21 %
NRBC BLD-RTO: 0 /100 WBC
PLATELET # BLD AUTO: 340 K/UL (ref 150–450)
PMV BLD AUTO: 10.6 FL (ref 9.2–12.9)
POC MOLECULAR INFLUENZA A AGN: NEGATIVE
POC MOLECULAR INFLUENZA B AGN: NEGATIVE
POTASSIUM SERPL-SCNC: 4.5 MMOL/L (ref 3.5–5.1)
PROCALCITONIN SERPL IA-MCNC: 0.54 NG/ML
PROT SERPL-MCNC: 6.5 G/DL (ref 6–8.4)
RBC # BLD AUTO: 4.04 M/UL (ref 4–5.4)
SODIUM SERPL-SCNC: 133 MMOL/L (ref 136–145)
WBC # BLD AUTO: 7.14 K/UL (ref 3.9–12.7)

## 2024-10-01 PROCEDURE — 25500020 PHARM REV CODE 255: Mod: HCNC | Performed by: STUDENT IN AN ORGANIZED HEALTH CARE EDUCATION/TRAINING PROGRAM

## 2024-10-01 PROCEDURE — 84145 PROCALCITONIN (PCT): CPT | Mod: HCNC | Performed by: EMERGENCY MEDICINE

## 2024-10-01 PROCEDURE — 86704 HEP B CORE ANTIBODY TOTAL: CPT | Mod: HCNC

## 2024-10-01 PROCEDURE — 83690 ASSAY OF LIPASE: CPT | Mod: HCNC | Performed by: NURSE PRACTITIONER

## 2024-10-01 PROCEDURE — 85007 BL SMEAR W/DIFF WBC COUNT: CPT | Mod: HCNC | Performed by: NURSE PRACTITIONER

## 2024-10-01 PROCEDURE — 96372 THER/PROPH/DIAG INJ SC/IM: CPT | Mod: 59 | Performed by: EMERGENCY MEDICINE

## 2024-10-01 PROCEDURE — 85027 COMPLETE CBC AUTOMATED: CPT | Mod: HCNC | Performed by: NURSE PRACTITIONER

## 2024-10-01 PROCEDURE — 96360 HYDRATION IV INFUSION INIT: CPT | Mod: HCNC

## 2024-10-01 PROCEDURE — G0378 HOSPITAL OBSERVATION PER HR: HCPCS | Mod: HCNC

## 2024-10-01 PROCEDURE — 99285 EMERGENCY DEPT VISIT HI MDM: CPT | Mod: 25,HCNC

## 2024-10-01 PROCEDURE — 87389 HIV-1 AG W/HIV-1&-2 AB AG IA: CPT | Mod: HCNC

## 2024-10-01 PROCEDURE — 80053 COMPREHEN METABOLIC PANEL: CPT | Mod: HCNC | Performed by: NURSE PRACTITIONER

## 2024-10-01 PROCEDURE — 63600175 PHARM REV CODE 636 W HCPCS: Mod: HCNC

## 2024-10-01 PROCEDURE — 63600175 PHARM REV CODE 636 W HCPCS: Mod: HCNC | Performed by: EMERGENCY MEDICINE

## 2024-10-01 PROCEDURE — 36415 COLL VENOUS BLD VENIPUNCTURE: CPT | Mod: HCNC

## 2024-10-01 PROCEDURE — 93005 ELECTROCARDIOGRAM TRACING: CPT | Mod: HCNC

## 2024-10-01 PROCEDURE — 93010 ELECTROCARDIOGRAM REPORT: CPT | Mod: HCNC,,, | Performed by: INTERNAL MEDICINE

## 2024-10-01 PROCEDURE — 25000003 PHARM REV CODE 250: Mod: HCNC | Performed by: EMERGENCY MEDICINE

## 2024-10-01 PROCEDURE — 96372 THER/PROPH/DIAG INJ SC/IM: CPT

## 2024-10-01 RX ORDER — IBUPROFEN 200 MG
24 TABLET ORAL
Status: DISCONTINUED | OUTPATIENT
Start: 2024-10-01 | End: 2024-10-07 | Stop reason: HOSPADM

## 2024-10-01 RX ORDER — ONDANSETRON 4 MG/1
4 TABLET, ORALLY DISINTEGRATING ORAL
Status: COMPLETED | OUTPATIENT
Start: 2024-10-01 | End: 2024-10-01

## 2024-10-01 RX ORDER — NALOXONE HCL 0.4 MG/ML
0.02 VIAL (ML) INJECTION
Status: DISCONTINUED | OUTPATIENT
Start: 2024-10-01 | End: 2024-10-07 | Stop reason: HOSPADM

## 2024-10-01 RX ORDER — IBUPROFEN 200 MG
16 TABLET ORAL
Status: DISCONTINUED | OUTPATIENT
Start: 2024-10-01 | End: 2024-10-07 | Stop reason: HOSPADM

## 2024-10-01 RX ORDER — ACETAMINOPHEN 325 MG/1
650 TABLET ORAL EVERY 6 HOURS PRN
Status: DISCONTINUED | OUTPATIENT
Start: 2024-10-01 | End: 2024-10-02 | Stop reason: SDUPTHER

## 2024-10-01 RX ORDER — TALC
6 POWDER (GRAM) TOPICAL NIGHTLY PRN
Status: DISCONTINUED | OUTPATIENT
Start: 2024-10-01 | End: 2024-10-07 | Stop reason: HOSPADM

## 2024-10-01 RX ORDER — ALUMINUM HYDROXIDE, MAGNESIUM HYDROXIDE, AND SIMETHICONE 1200; 120; 1200 MG/30ML; MG/30ML; MG/30ML
30 SUSPENSION ORAL EVERY 6 HOURS PRN
Status: DISCONTINUED | OUTPATIENT
Start: 2024-10-01 | End: 2024-10-04

## 2024-10-01 RX ORDER — ONDANSETRON HYDROCHLORIDE 2 MG/ML
4 INJECTION, SOLUTION INTRAVENOUS ONCE AS NEEDED
Status: COMPLETED | OUTPATIENT
Start: 2024-10-01 | End: 2024-10-02

## 2024-10-01 RX ORDER — SODIUM CHLORIDE 0.9 % (FLUSH) 0.9 %
10 SYRINGE (ML) INJECTION EVERY 12 HOURS PRN
Status: DISCONTINUED | OUTPATIENT
Start: 2024-10-01 | End: 2024-10-07 | Stop reason: HOSPADM

## 2024-10-01 RX ORDER — SODIUM CHLORIDE, SODIUM LACTATE, POTASSIUM CHLORIDE, CALCIUM CHLORIDE 600; 310; 30; 20 MG/100ML; MG/100ML; MG/100ML; MG/100ML
INJECTION, SOLUTION INTRAVENOUS CONTINUOUS
Status: ACTIVE | OUTPATIENT
Start: 2024-10-01 | End: 2024-10-03

## 2024-10-01 RX ORDER — DICYCLOMINE HYDROCHLORIDE 10 MG/ML
20 INJECTION INTRAMUSCULAR
Status: COMPLETED | OUTPATIENT
Start: 2024-10-01 | End: 2024-10-01

## 2024-10-01 RX ORDER — ENOXAPARIN SODIUM 100 MG/ML
40 INJECTION SUBCUTANEOUS EVERY 24 HOURS
Status: DISCONTINUED | OUTPATIENT
Start: 2024-10-01 | End: 2024-10-07 | Stop reason: HOSPADM

## 2024-10-01 RX ORDER — GLUCAGON 1 MG
1 KIT INJECTION
Status: DISCONTINUED | OUTPATIENT
Start: 2024-10-01 | End: 2024-10-07 | Stop reason: HOSPADM

## 2024-10-01 RX ADMIN — SODIUM CHLORIDE 1000 ML: 9 INJECTION, SOLUTION INTRAVENOUS at 08:10

## 2024-10-01 RX ADMIN — ONDANSETRON 4 MG: 4 TABLET, ORALLY DISINTEGRATING ORAL at 08:10

## 2024-10-01 RX ADMIN — DICYCLOMINE HYDROCHLORIDE 20 MG: 10 INJECTION, SOLUTION INTRAMUSCULAR at 08:10

## 2024-10-01 RX ADMIN — ENOXAPARIN SODIUM 40 MG: 40 INJECTION SUBCUTANEOUS at 09:10

## 2024-10-01 RX ADMIN — SODIUM CHLORIDE, POTASSIUM CHLORIDE, SODIUM LACTATE AND CALCIUM CHLORIDE: 600; 310; 30; 20 INJECTION, SOLUTION INTRAVENOUS at 09:10

## 2024-10-01 RX ADMIN — IOHEXOL 75 ML: 350 INJECTION, SOLUTION INTRAVENOUS at 08:10

## 2024-10-01 NOTE — ED TRIAGE NOTES
Pt reports nausea, vomiting and diarrhea starting last night.   Pt took Imodium and Zofran for symptoms. Pt hx of lung and bone cancer and is on immunotherapy.

## 2024-10-01 NOTE — ED PROVIDER NOTES
Encounter Date: 10/1/2024    SCRIBE #1 NOTE: I, Stefanie Yates, am scribing for, and in the presence of,  Evens Maier MD. Other sections scribed: HPI, ROS.       History     Chief Complaint   Patient presents with    Nausea     Pt BIB EMS, c/o n/v/d since last night. Pt denies any CP, SOB, or abd pain.      CC: Nausea    HPI: 69 y.o. F who has a PMHx of AICD, Asthma, Right Breast cancer, Cardiac pacemaker, Cardiomyopathy, COPD, DM, HLD, and HTN who presents to the ED for emergent evaluation of acute nausea with associated vomiting that began today. Pt reports 1 episode of vomiting today. Pt also has associated diarrhea that began 2-3 days ago. Pt reports 12 episodes of watery stool within the last 24 hours. Pt endorses abdominal pain that she describes as a cramping sensation. Pt reports a Hx of lung cancer, and bone cancer. She is currently on immunotherapy infusion. The pt's daughter states that she was informed that the symptoms are likely secondary to a reaction from the immunotherapy infusion, or a possible infection. The pt is followed by Dr. Avina. Additionally, the pt endorses pain in the left lower extremity since have a sade placed in the left lower extremity 6 weeks ago. Pt denies fever, chills, nasal congestion, runny nose, ear pain, sore throat, eye problem, cough, SOB, CP, dysuria, arm problems, back pain, rash, or headache.    The history is provided by the patient and a relative. No  was used.     Review of patient's allergies indicates:   Allergen Reactions    Taxol [paclitaxel]      Hypersensitivity reaction to taxol, symptoms included shortness of breath, nausea, dizziness, flushing     Carboplatin Other (See Comments)     Itching and hives    Adhesive Rash     tegaderm burns and blistered skin     Past Medical History:   Diagnosis Date    AICD (automatic cardioverter/defibrillator) present     Asthma     bronchitis in past    Breast cancer 2016    right    Cardiac  pacemaker     Cardiomyopathy     COPD (chronic obstructive pulmonary disease)     Diabetes mellitus, type 2     Hyperglycemia     Hyperlipidemia     Hypertension     Malignant neoplasm of overlapping sites of female breast 2016    Nuclear sclerosis of both eyes 2020    Respiratory distress 3/12/2020     Past Surgical History:   Procedure Laterality Date    BIOPSY, WITH CT GUIDANCE Right 2023    Procedure: LUNG MASS BIOPSY, WITH CT GUIDANCE;  Surgeon: Yehuda Green MD;  Location: [facility] CATH LAB;  Service: Radiology;  Laterality: Right;    BREAST BIOPSY Right 2016    IDC    BREAST LUMPECTOMY Right     CARDIAC CATHETERIZATION Bilateral 2017    CARDIAC DEFIBRILLATOR PLACEMENT Left 08/10/2017    CARDIAC DEFIBRILLATOR PLACEMENT Left 2018     SECTION      x2    CHOLECYSTECTOMY      FEMUR BIOPSY Left 2024    Procedure: BIOPSY, FEMUR;  Surgeon: Porter Campos MD;  [facility];  Service: Orthopedics;  Laterality: Left;    INSERTION OF TUNNELED CENTRAL VENOUS CATHETER (CVC) WITH SUBCUTANEOUS PORT Right 2020    Procedure: [id number], RIGHT;  Surgeon: Josefa Caceres MD;  [facility];  Service: General;  Laterality: Right;  Port-a-cath placed to R. IJ    INTRAMEDULLARY RODDING OF FEMUR Left 2024    Procedure: INSERTION, INTRAMEDULLARY ANTOINE, FEMUR;  Surgeon: Porter Campos MD;  [facility];  Service: Orthopedics;  Laterality: Left;    LUNG BIOPSY N/A 2020    Procedure: BIOPSY, LUNG;  Surgeon: [facility] Diagnostic Provider;  Location: [facility] OR;  Service: Radiology;  Laterality: N/A;  8AM START  RN PREOP 2020---COVID NEGATIVE    NAVIGATIONAL BRONCHOSCOPY N/A 10/13/2020    Procedure: BRONCHOSCOPY, NAVIGATIONAL;  Surgeon: Jamilah Weaver MD;  Location: [facility] OR [facility] FLR;  Service: Pulmonary;  Laterality: N/A;    REVISION OF IMPLANTABLE CARDIOVERTER-DEFIBRILLATOR (ICD) ELECTRODE LEAD PLACEMENT N/A  6/15/2018    Procedure: REVISION-LEAD-ICD;  Surgeon: Javy Gates MD;  Location: [facility] CATH LAB;  Service: Cardiology;  Laterality: N/A;  LBBB, Bi-V ICD HIS Ld , MDT, Wilda, MB, 3 Prep    ROBOT-ASSISTED LAPAROSCOPIC LYMPHADENECTOMY USING DA SALVADOR XI Right 10/23/2020    Procedure: XI ROBOTIC LYMPHADENECTOMY;  Surgeon: Ramo Tucker MD;  Location: [facility] OR [facility] FLR;  Service: Thoracic;  Laterality: Right;    TUBAL LIGATION       Family History   Problem Relation Name Age of Onset    Kidney disease Mother      Cataracts Mother      Kidney disease Father      Cataracts Father      Clotting disorder Brother      No Known Problems Daughter      No Known Problems Son      No Known Problems Sister      No Known Problems Maternal Aunt      No Known Problems Maternal Uncle      No Known Problems Paternal Aunt      No Known Problems Paternal Uncle      Macular degeneration Maternal Grandmother      No Known Problems Maternal Grandfather      No Known Problems Paternal Grandmother      No Known Problems Paternal Grandfather      Breast cancer Neg Hx      Ovarian cancer Neg Hx      Amblyopia Neg Hx      Blindness Neg Hx      Cancer Neg Hx      Diabetes Neg Hx      Glaucoma Neg Hx      Hypertension Neg Hx      Retinal detachment Neg Hx      Strabismus Neg Hx      Stroke Neg Hx      Thyroid disease Neg Hx       Social History     Tobacco Use    Smoking status: Former     Current packs/day: 0.00     Average packs/day: 0.5 packs/day for 40.0 years (20.0 ttl pk-yrs)     Types: Cigarettes     Start date: 1976     Quit date: 2016     Years since quittin.1     Passive exposure: Past    Smokeless tobacco: Never   Substance Use Topics    Alcohol use: Yes     Alcohol/week: 1.0 standard drink of alcohol     Types: 1 Glasses of wine per week     Comment: rare- holiday    Drug use: No     Review of Systems   Constitutional:  Negative for chills and fever.   HENT:  Negative for congestion, ear pain, rhinorrhea and sore  throat.    Respiratory:  Negative for cough and shortness of breath.    Cardiovascular:  Negative for chest pain.   Gastrointestinal:  Positive for abdominal pain, diarrhea, nausea and vomiting.   Genitourinary:  Negative for dysuria.   Musculoskeletal:  Negative for back pain and neck pain.        (+) Pain in the left lower extremity  (-) Arm pain   Skin:  Negative for rash.   Neurological:  Negative for weakness and headaches.   Hematological:  Does not bruise/bleed easily.       Physical Exam     Initial Vitals [10/01/24 0813]   BP Pulse Resp Temp SpO2   128/68 100 16 97.6 °F (36.4 °C) 95 %      MAP       --         Physical Exam  The patient was examined specifically for the following:   General:No significant distress, Good color, Warm and dry. Head and neck:Scalp atraumatic, Neck supple. Neurological:Appropriate conversation, Gross motor deficits. Eyes:Conjugate gaze, Clear corneas. ENT: No epistaxis. Cardiac: Regular rate and rhythm, Grossly normal heart tones. Pulmonary: Wheezing, Rales. Gastrointestinal: Abdominal tenderness, Abdominal distention. Musculoskeletal: Extremity deformity, Apparent pain with range of motion of the joints. Skin: Rash.   The findings on examination were normal except for the following:  There is minimal vague diffuse abdominal tenderness.  The patient is afebrile.  Her heart rate is 100.  The lungs are clear and free of wheezing rales rubs or rhonchi heart tones are normal.  The patient has regular rate and rhythm.  The abdomen is soft.  ED Course   Procedures  Labs Reviewed   CBC W/ AUTO DIFFERENTIAL - Abnormal       Result Value    WBC 7.14      RBC 4.04      Hemoglobin 9.1 (*)     Hematocrit 31.1 (*)     MCV 77 (*)     MCH 22.5 (*)     MCHC 29.3 (*)     RDW 19.9 (*)     Platelets 340      MPV 10.6      Immature Granulocytes Test Not Performed      Gran # (ANC) Test Not Performed      Immature Grans (Abs) Test Not Performed      Lymph # Test Not Performed      Mono # Test Not  Performed      Eos # Test Not Performed      Baso # Test Not Performed      nRBC 0      Gran % 56.0      Lymph % 7.0 (*)     Mono % 14.0      Eosinophil % 0.0      Basophil % 0.0      Bands 21.0      Metamyelocytes 2.0      Differential Method Manual     COMPREHENSIVE METABOLIC PANEL - Abnormal    Sodium 133 (*)     Potassium 4.5      Chloride 99      CO2 25      Glucose 152 (*)     BUN 27 (*)     Creatinine 1.0      Calcium 9.5      Total Protein 6.5      Albumin 2.2 (*)     Total Bilirubin 0.3      Alkaline Phosphatase 100      AST 19      ALT 14      eGFR >60      Anion Gap 9     PROCALCITONIN - Abnormal    Procalcitonin 0.54 (*)    LIPASE    Lipase 43     URINALYSIS, REFLEX TO URINE CULTURE   POCT INFLUENZA A/B MOLECULAR    POC Molecular Influenza A Ag Negative      POC Molecular Influenza B Ag Negative       Acceptable Yes     SARS-COV-2 RDRP GENE          Imaging Results    None          Medications   sodium chloride 0.9% bolus 1,000 mL 1,000 mL (0 mLs Intravenous Stopped 10/1/24 1030)   dicyclomine injection 20 mg (20 mg Intramuscular Given 10/1/24 0851)   ondansetron disintegrating tablet 4 mg (4 mg Oral Given 10/1/24 0849)     Medical Decision Making  Amount and/or Complexity of Data Reviewed  Independent Historian:      Details: History is obtained from independent historian: pt and pt's daughter.  Labs: ordered.    Risk  Prescription drug management.    Given the above, this patient presents emergency room with a history of lung cancer.  The patient is receiving chemotherapy.  Oncology recommends evaluation by Gastroenterology and evaluation for immune moderator inhibitor colitis.  Consultation with Gastroenterology is recommended.  Admission is suggested.  Diagnostic evaluation is remarkable for an elevated procalcitonin.  Count is normal at 7000 there is a mild anemia with a hemoglobin of 9.  The lipase is normal chemistries are normal the patient's glucose is 152 the BUN is 27 the  albumin is slightly 02.2.  This patient is likely dehydrated with a BUN of 27.  I will place to observation as recommended.  The abdomen is soft I doubt peritonitis.  Bowel obstruction would seem to be unlikely.        Scribe Attestation:   Scribe #1: I performed the above scribed service and the documentation accurately describes the services I performed. I attest to the accuracy of the note.                           Please note that the documentation on this chart was provided by the scribe above on the date of service noted above, and that the documentation in the chart accurately reflects the work and decisions made by me alone.  Signed, Dr. Maier    Clinical Impression:  Final diagnoses:  [R11.10, R19.7] Vomiting and diarrhea (Primary)  [C34.90] Malignant neoplasm of lung, unspecified laterality, unspecified part of lung  [T45.1X5A] Adverse effect of chemotherapy, initial encounter                 Evens Maier MD  10/01/24 0914

## 2024-10-01 NOTE — TELEPHONE ENCOUNTER
Spoke with pt wife Elizabeth, regarding pt appointment today. Pt is on the way to the ER after having a 3 day course of diarrhea that has persisted despite taking the daily max dose of imodium. Pt also experiencing N/V. Elizabeth reports that she did not know immunotherapy could cause diarrhea this severe and thought it would be OK to take imodium to help stop it. I reminded Elizabeth that diarrhea is an adverse effect of immunotherapy that antidiarrheals should not be taken because we need to rule out infection before treating the colitis. Pt daughter said she will keep that in mind for the future.

## 2024-10-02 PROBLEM — R19.7 DIARRHEA: Status: ACTIVE | Noted: 2024-10-02

## 2024-10-02 PROBLEM — D64.81 ANTINEOPLASTIC CHEMOTHERAPY INDUCED ANEMIA: Status: ACTIVE | Noted: 2024-10-02

## 2024-10-02 PROBLEM — D64.9 ANEMIA: Status: ACTIVE | Noted: 2024-08-21

## 2024-10-02 PROBLEM — T45.1X5A ANTINEOPLASTIC CHEMOTHERAPY INDUCED ANEMIA: Status: ACTIVE | Noted: 2024-10-02

## 2024-10-02 LAB
ALBUMIN SERPL BCP-MCNC: 1.9 G/DL (ref 3.5–5.2)
ALP SERPL-CCNC: 77 U/L (ref 55–135)
ALT SERPL W/O P-5'-P-CCNC: 13 U/L (ref 10–44)
ANION GAP SERPL CALC-SCNC: 11 MMOL/L (ref 8–16)
AST SERPL-CCNC: 16 U/L (ref 10–40)
BASOPHILS # BLD AUTO: ABNORMAL K/UL (ref 0–0.2)
BASOPHILS NFR BLD: 0 % (ref 0–1.9)
BILIRUB SERPL-MCNC: 0.2 MG/DL (ref 0.1–1)
BUN SERPL-MCNC: 16 MG/DL (ref 8–23)
CALCIUM SERPL-MCNC: 8.5 MG/DL (ref 8.7–10.5)
CHLORIDE SERPL-SCNC: 103 MMOL/L (ref 95–110)
CO2 SERPL-SCNC: 20 MMOL/L (ref 23–29)
CREAT SERPL-MCNC: 0.6 MG/DL (ref 0.5–1.4)
CRP SERPL-MCNC: 132.7 MG/L (ref 0–8.2)
DIFFERENTIAL METHOD BLD: ABNORMAL
EOSINOPHIL # BLD AUTO: ABNORMAL K/UL (ref 0–0.5)
EOSINOPHIL NFR BLD: 0 % (ref 0–8)
ERYTHROCYTE [DISTWIDTH] IN BLOOD BY AUTOMATED COUNT: 19.4 % (ref 11.5–14.5)
ERYTHROCYTE [SEDIMENTATION RATE] IN BLOOD BY PHOTOMETRIC METHOD: 95 MM/HR (ref 0–36)
EST. GFR  (NO RACE VARIABLE): >60 ML/MIN/1.73 M^2
FERRITIN SERPL-MCNC: 355 NG/ML (ref 20–300)
GLUCOSE SERPL-MCNC: 120 MG/DL (ref 70–110)
HBV CORE AB SERPL QL IA: NORMAL
HBV SURFACE AG SERPL QL IA: NORMAL
HCT VFR BLD AUTO: 26.9 % (ref 37–48.5)
HCV AB SERPL QL IA: NORMAL
HGB BLD-MCNC: 7.7 G/DL (ref 12–16)
HIV 1+2 AB+HIV1 P24 AG SERPL QL IA: NORMAL
IMM GRANULOCYTES # BLD AUTO: ABNORMAL K/UL (ref 0–0.04)
IMM GRANULOCYTES NFR BLD AUTO: ABNORMAL % (ref 0–0.5)
IRON SERPL-MCNC: 17 UG/DL (ref 30–160)
LYMPHOCYTES # BLD AUTO: ABNORMAL K/UL (ref 1–4.8)
LYMPHOCYTES NFR BLD: 9 % (ref 18–48)
MAGNESIUM SERPL-MCNC: 0.9 MG/DL (ref 1.6–2.6)
MCH RBC QN AUTO: 21.8 PG (ref 27–31)
MCHC RBC AUTO-ENTMCNC: 28.6 G/DL (ref 32–36)
MCV RBC AUTO: 76 FL (ref 82–98)
METAMYELOCYTES NFR BLD MANUAL: 1 %
MONOCYTES # BLD AUTO: ABNORMAL K/UL (ref 0.3–1)
MONOCYTES NFR BLD: 16 % (ref 4–15)
NEUTROPHILS NFR BLD: 51 % (ref 38–73)
NEUTS BAND NFR BLD MANUAL: 23 %
NRBC BLD-RTO: 0 /100 WBC
OHS QRS DURATION: 82 MS
OHS QTC CALCULATION: 431 MS
PHOSPHATE SERPL-MCNC: 2.5 MG/DL (ref 2.7–4.5)
PLATELET # BLD AUTO: 292 K/UL (ref 150–450)
PMV BLD AUTO: 11.3 FL (ref 9.2–12.9)
POCT GLUCOSE: 124 MG/DL (ref 70–110)
POCT GLUCOSE: 127 MG/DL (ref 70–110)
POCT GLUCOSE: 128 MG/DL (ref 70–110)
POCT GLUCOSE: 136 MG/DL (ref 70–110)
POTASSIUM SERPL-SCNC: 4.2 MMOL/L (ref 3.5–5.1)
PROT SERPL-MCNC: 5.4 G/DL (ref 6–8.4)
RBC # BLD AUTO: 3.54 M/UL (ref 4–5.4)
SATURATED IRON: 8 % (ref 20–50)
SODIUM SERPL-SCNC: 134 MMOL/L (ref 136–145)
TOTAL IRON BINDING CAPACITY: 215 UG/DL (ref 250–450)
TRANSFERRIN SERPL-MCNC: 145 MG/DL (ref 200–375)
WBC # BLD AUTO: 5.25 K/UL (ref 3.9–12.7)

## 2024-10-02 PROCEDURE — 83993 ASSAY FOR CALPROTECTIN FECAL: CPT | Mod: HCNC

## 2024-10-02 PROCEDURE — 25000003 PHARM REV CODE 250: Mod: HCNC | Performed by: NURSE PRACTITIONER

## 2024-10-02 PROCEDURE — 87449 NOS EACH ORGANISM AG IA: CPT | Mod: 91,HCNC

## 2024-10-02 PROCEDURE — 86803 HEPATITIS C AB TEST: CPT | Mod: HCNC

## 2024-10-02 PROCEDURE — 85027 COMPLETE CBC AUTOMATED: CPT | Mod: HCNC

## 2024-10-02 PROCEDURE — 36415 COLL VENOUS BLD VENIPUNCTURE: CPT | Mod: HCNC

## 2024-10-02 PROCEDURE — 25000242 PHARM REV CODE 250 ALT 637 W/ HCPCS: Mod: HCNC | Performed by: STUDENT IN AN ORGANIZED HEALTH CARE EDUCATION/TRAINING PROGRAM

## 2024-10-02 PROCEDURE — 87449 NOS EACH ORGANISM AG IA: CPT | Mod: HCNC

## 2024-10-02 PROCEDURE — 87340 HEPATITIS B SURFACE AG IA: CPT | Mod: HCNC

## 2024-10-02 PROCEDURE — 63600175 PHARM REV CODE 636 W HCPCS: Mod: HCNC

## 2024-10-02 PROCEDURE — 25000003 PHARM REV CODE 250: Mod: HCNC

## 2024-10-02 PROCEDURE — 99900031 HC PATIENT EDUCATION (STAT): Mod: HCNC

## 2024-10-02 PROCEDURE — 80053 COMPREHEN METABOLIC PANEL: CPT | Mod: HCNC

## 2024-10-02 PROCEDURE — 83735 ASSAY OF MAGNESIUM: CPT | Mod: HCNC

## 2024-10-02 PROCEDURE — 87045 FECES CULTURE AEROBIC BACT: CPT | Mod: HCNC

## 2024-10-02 PROCEDURE — 86480 TB TEST CELL IMMUN MEASURE: CPT | Mod: HCNC

## 2024-10-02 PROCEDURE — 36415 COLL VENOUS BLD VENIPUNCTURE: CPT | Mod: HCNC,XB | Performed by: INTERNAL MEDICINE

## 2024-10-02 PROCEDURE — 87324 CLOSTRIDIUM AG IA: CPT | Mod: HCNC

## 2024-10-02 PROCEDURE — 36415 COLL VENOUS BLD VENIPUNCTURE: CPT | Mod: HCNC | Performed by: NURSE PRACTITIONER

## 2024-10-02 PROCEDURE — 83540 ASSAY OF IRON: CPT | Mod: HCNC | Performed by: INTERNAL MEDICINE

## 2024-10-02 PROCEDURE — 99223 1ST HOSP IP/OBS HIGH 75: CPT | Mod: HCNC,,, | Performed by: NURSE PRACTITIONER

## 2024-10-02 PROCEDURE — 86140 C-REACTIVE PROTEIN: CPT | Mod: HCNC | Performed by: NURSE PRACTITIONER

## 2024-10-02 PROCEDURE — 27000207 HC ISOLATION: Mod: HCNC

## 2024-10-02 PROCEDURE — 85652 RBC SED RATE AUTOMATED: CPT | Mod: HCNC | Performed by: INTERNAL MEDICINE

## 2024-10-02 PROCEDURE — 84100 ASSAY OF PHOSPHORUS: CPT | Mod: HCNC

## 2024-10-02 PROCEDURE — 63600175 PHARM REV CODE 636 W HCPCS: Mod: HCNC | Performed by: PHYSICIAN ASSISTANT

## 2024-10-02 PROCEDURE — 87046 STOOL CULTR AEROBIC BACT EA: CPT | Mod: HCNC

## 2024-10-02 PROCEDURE — 94640 AIRWAY INHALATION TREATMENT: CPT | Mod: HCNC

## 2024-10-02 PROCEDURE — 85007 BL SMEAR W/DIFF WBC COUNT: CPT | Mod: HCNC

## 2024-10-02 PROCEDURE — 82705 FATS/LIPIDS FECES QUAL: CPT | Mod: HCNC

## 2024-10-02 PROCEDURE — 82653 EL-1 FECAL QUANTITATIVE: CPT | Mod: HCNC

## 2024-10-02 PROCEDURE — 11000001 HC ACUTE MED/SURG PRIVATE ROOM: Mod: HCNC

## 2024-10-02 PROCEDURE — 86364 TISS TRNSGLTMNASE EA IG CLAS: CPT | Mod: HCNC | Performed by: NURSE PRACTITIONER

## 2024-10-02 PROCEDURE — 99223 1ST HOSP IP/OBS HIGH 75: CPT | Mod: HCNC,,, | Performed by: INTERNAL MEDICINE

## 2024-10-02 PROCEDURE — 87427 SHIGA-LIKE TOXIN AG IA: CPT | Mod: HCNC

## 2024-10-02 PROCEDURE — 83630 LACTOFERRIN FECAL (QUAL): CPT | Mod: HCNC

## 2024-10-02 PROCEDURE — 87425 ROTAVIRUS AG IA: CPT | Mod: HCNC

## 2024-10-02 PROCEDURE — 94761 N-INVAS EAR/PLS OXIMETRY MLT: CPT | Mod: HCNC

## 2024-10-02 PROCEDURE — 82728 ASSAY OF FERRITIN: CPT | Mod: HCNC | Performed by: INTERNAL MEDICINE

## 2024-10-02 PROCEDURE — 84466 ASSAY OF TRANSFERRIN: CPT | Mod: HCNC | Performed by: INTERNAL MEDICINE

## 2024-10-02 RX ORDER — ATORVASTATIN CALCIUM 10 MG/1
10 TABLET, FILM COATED ORAL DAILY
Status: DISCONTINUED | OUTPATIENT
Start: 2024-10-02 | End: 2024-10-07 | Stop reason: HOSPADM

## 2024-10-02 RX ORDER — MICONAZOLE NITRATE 2 G/100G
POWDER TOPICAL 2 TIMES DAILY
Status: DISCONTINUED | OUTPATIENT
Start: 2024-10-02 | End: 2024-10-07 | Stop reason: HOSPADM

## 2024-10-02 RX ORDER — PANTOPRAZOLE SODIUM 40 MG/1
40 TABLET, DELAYED RELEASE ORAL DAILY
Status: DISCONTINUED | OUTPATIENT
Start: 2024-10-02 | End: 2024-10-07 | Stop reason: HOSPADM

## 2024-10-02 RX ORDER — INSULIN ASPART 100 [IU]/ML
0-5 INJECTION, SOLUTION INTRAVENOUS; SUBCUTANEOUS EVERY 6 HOURS PRN
Status: DISCONTINUED | OUTPATIENT
Start: 2024-10-02 | End: 2024-10-07 | Stop reason: HOSPADM

## 2024-10-02 RX ORDER — GLUCAGON 1 MG
1 KIT INJECTION
Status: DISCONTINUED | OUTPATIENT
Start: 2024-10-02 | End: 2024-10-07 | Stop reason: HOSPADM

## 2024-10-02 RX ORDER — ARFORMOTEROL TARTRATE 15 UG/2ML
15 SOLUTION RESPIRATORY (INHALATION) 2 TIMES DAILY
Status: DISCONTINUED | OUTPATIENT
Start: 2024-10-02 | End: 2024-10-07 | Stop reason: HOSPADM

## 2024-10-02 RX ORDER — LOSARTAN POTASSIUM 25 MG/1
100 TABLET ORAL DAILY
Status: DISCONTINUED | OUTPATIENT
Start: 2024-10-02 | End: 2024-10-07 | Stop reason: HOSPADM

## 2024-10-02 RX ORDER — BENZONATATE 100 MG/1
200 CAPSULE ORAL 3 TIMES DAILY PRN
Status: DISCONTINUED | OUTPATIENT
Start: 2024-10-02 | End: 2024-10-07 | Stop reason: HOSPADM

## 2024-10-02 RX ORDER — OXYCODONE HYDROCHLORIDE 5 MG/1
5 TABLET ORAL EVERY 4 HOURS PRN
Status: DISCONTINUED | OUTPATIENT
Start: 2024-10-02 | End: 2024-10-07 | Stop reason: HOSPADM

## 2024-10-02 RX ORDER — ALBUTEROL SULFATE 90 UG/1
2 INHALANT RESPIRATORY (INHALATION) EVERY 4 HOURS PRN
Status: DISCONTINUED | OUTPATIENT
Start: 2024-10-02 | End: 2024-10-02 | Stop reason: CLARIF

## 2024-10-02 RX ORDER — POLYETHYLENE GLYCOL 3350, SODIUM SULFATE ANHYDROUS, SODIUM BICARBONATE, SODIUM CHLORIDE, POTASSIUM CHLORIDE 236; 22.74; 6.74; 5.86; 2.97 G/4L; G/4L; G/4L; G/4L; G/4L
2000 POWDER, FOR SOLUTION ORAL ONCE
Status: COMPLETED | OUTPATIENT
Start: 2024-10-02 | End: 2024-10-02

## 2024-10-02 RX ORDER — IPRATROPIUM BROMIDE 0.5 MG/2.5ML
0.5 SOLUTION RESPIRATORY (INHALATION) EVERY 6 HOURS
Status: DISCONTINUED | OUTPATIENT
Start: 2024-10-02 | End: 2024-10-04

## 2024-10-02 RX ORDER — BUPROPION HYDROCHLORIDE 150 MG/1
150 TABLET ORAL DAILY
Status: DISCONTINUED | OUTPATIENT
Start: 2024-10-02 | End: 2024-10-07 | Stop reason: HOSPADM

## 2024-10-02 RX ORDER — METOPROLOL TARTRATE 25 MG/1
25 TABLET, FILM COATED ORAL 4 TIMES DAILY
Status: DISCONTINUED | OUTPATIENT
Start: 2024-10-02 | End: 2024-10-07 | Stop reason: HOSPADM

## 2024-10-02 RX ORDER — FLUTICASONE FUROATE AND VILANTEROL 100; 25 UG/1; UG/1
1 POWDER RESPIRATORY (INHALATION) DAILY
Status: DISCONTINUED | OUTPATIENT
Start: 2024-10-02 | End: 2024-10-02 | Stop reason: CLARIF

## 2024-10-02 RX ORDER — POLYETHYLENE GLYCOL 3350, SODIUM SULFATE ANHYDROUS, SODIUM BICARBONATE, SODIUM CHLORIDE, POTASSIUM CHLORIDE 236; 22.74; 6.74; 5.86; 2.97 G/4L; G/4L; G/4L; G/4L; G/4L
2000 POWDER, FOR SOLUTION ORAL ONCE
Status: COMPLETED | OUTPATIENT
Start: 2024-10-03 | End: 2024-10-03

## 2024-10-02 RX ORDER — SERTRALINE HYDROCHLORIDE 50 MG/1
100 TABLET, FILM COATED ORAL NIGHTLY
Status: DISCONTINUED | OUTPATIENT
Start: 2024-10-02 | End: 2024-10-07 | Stop reason: HOSPADM

## 2024-10-02 RX ORDER — CETIRIZINE HYDROCHLORIDE 10 MG/1
10 TABLET ORAL NIGHTLY
Status: DISCONTINUED | OUTPATIENT
Start: 2024-10-02 | End: 2024-10-07 | Stop reason: HOSPADM

## 2024-10-02 RX ORDER — METHOCARBAMOL 750 MG/1
750 TABLET, FILM COATED ORAL 4 TIMES DAILY
Status: DISCONTINUED | OUTPATIENT
Start: 2024-10-02 | End: 2024-10-07 | Stop reason: HOSPADM

## 2024-10-02 RX ORDER — GABAPENTIN 300 MG/1
300 CAPSULE ORAL 3 TIMES DAILY
Status: DISCONTINUED | OUTPATIENT
Start: 2024-10-02 | End: 2024-10-07 | Stop reason: HOSPADM

## 2024-10-02 RX ORDER — BUDESONIDE 0.5 MG/2ML
0.5 INHALANT ORAL EVERY 12 HOURS
Status: DISCONTINUED | OUTPATIENT
Start: 2024-10-02 | End: 2024-10-07 | Stop reason: HOSPADM

## 2024-10-02 RX ORDER — MAGNESIUM SULFATE 1 G/100ML
1 INJECTION INTRAVENOUS ONCE
Status: COMPLETED | OUTPATIENT
Start: 2024-10-02 | End: 2024-10-02

## 2024-10-02 RX ORDER — ALBUTEROL SULFATE 2.5 MG/.5ML
2.5 SOLUTION RESPIRATORY (INHALATION) EVERY 4 HOURS PRN
Status: DISCONTINUED | OUTPATIENT
Start: 2024-10-02 | End: 2024-10-07 | Stop reason: HOSPADM

## 2024-10-02 RX ORDER — ACETAMINOPHEN 500 MG
1000 TABLET ORAL EVERY 8 HOURS PRN
Status: DISCONTINUED | OUTPATIENT
Start: 2024-10-02 | End: 2024-10-07 | Stop reason: HOSPADM

## 2024-10-02 RX ORDER — MAGNESIUM SULFATE HEPTAHYDRATE 40 MG/ML
2 INJECTION, SOLUTION INTRAVENOUS ONCE
Status: COMPLETED | OUTPATIENT
Start: 2024-10-02 | End: 2024-10-02

## 2024-10-02 RX ORDER — MELOXICAM 7.5 MG/1
7.5 TABLET ORAL DAILY
Status: DISCONTINUED | OUTPATIENT
Start: 2024-10-02 | End: 2024-10-07 | Stop reason: HOSPADM

## 2024-10-02 RX ADMIN — GABAPENTIN 300 MG: 300 CAPSULE ORAL at 09:10

## 2024-10-02 RX ADMIN — BUDESONIDE 0.5 MG: 0.5 INHALANT RESPIRATORY (INHALATION) at 07:10

## 2024-10-02 RX ADMIN — ARFORMOTEROL TARTRATE 15 MCG: 15 SOLUTION RESPIRATORY (INHALATION) at 08:10

## 2024-10-02 RX ADMIN — IPRATROPIUM BROMIDE 0.5 MG: 0.5 SOLUTION RESPIRATORY (INHALATION) at 08:10

## 2024-10-02 RX ADMIN — SODIUM CHLORIDE, POTASSIUM CHLORIDE, SODIUM LACTATE AND CALCIUM CHLORIDE: 600; 310; 30; 20 INJECTION, SOLUTION INTRAVENOUS at 05:10

## 2024-10-02 RX ADMIN — ENOXAPARIN SODIUM 40 MG: 40 INJECTION SUBCUTANEOUS at 06:10

## 2024-10-02 RX ADMIN — GABAPENTIN 300 MG: 300 CAPSULE ORAL at 01:10

## 2024-10-02 RX ADMIN — METHOCARBAMOL TABLETS 750 MG: 750 TABLET, COATED ORAL at 09:10

## 2024-10-02 RX ADMIN — METHOCARBAMOL TABLETS 750 MG: 750 TABLET, COATED ORAL at 06:10

## 2024-10-02 RX ADMIN — MICONAZOLE NITRATE: 20 POWDER TOPICAL at 10:10

## 2024-10-02 RX ADMIN — IPRATROPIUM BROMIDE 0.5 MG: 0.5 SOLUTION RESPIRATORY (INHALATION) at 07:10

## 2024-10-02 RX ADMIN — BUDESONIDE 0.5 MG: 0.5 INHALANT RESPIRATORY (INHALATION) at 08:10

## 2024-10-02 RX ADMIN — ARFORMOTEROL TARTRATE 15 MCG: 15 SOLUTION RESPIRATORY (INHALATION) at 07:10

## 2024-10-02 RX ADMIN — METOPROLOL TARTRATE 25 MG: 25 TABLET, FILM COATED ORAL at 02:10

## 2024-10-02 RX ADMIN — CETIRIZINE HYDROCHLORIDE 10 MG: 10 TABLET, FILM COATED ORAL at 09:10

## 2024-10-02 RX ADMIN — METOPROLOL TARTRATE 25 MG: 25 TABLET, FILM COATED ORAL at 09:10

## 2024-10-02 RX ADMIN — MAGNESIUM SULFATE HEPTAHYDRATE 1 G: 10 INJECTION, SOLUTION INTRAVENOUS at 10:10

## 2024-10-02 RX ADMIN — IPRATROPIUM BROMIDE 0.5 MG: 0.5 SOLUTION RESPIRATORY (INHALATION) at 01:10

## 2024-10-02 RX ADMIN — POLYETHYLENE GLYCOL 3350, SODIUM SULFATE ANHYDROUS, SODIUM BICARBONATE, SODIUM CHLORIDE, POTASSIUM CHLORIDE 2000 ML: 236; 22.74; 6.74; 5.86; 2.97 POWDER, FOR SOLUTION ORAL at 07:10

## 2024-10-02 RX ADMIN — MICONAZOLE NITRATE: 20 POWDER TOPICAL at 08:10

## 2024-10-02 RX ADMIN — METOPROLOL TARTRATE 25 MG: 25 TABLET, FILM COATED ORAL at 06:10

## 2024-10-02 RX ADMIN — CETIRIZINE HYDROCHLORIDE 10 MG: 10 TABLET, FILM COATED ORAL at 01:10

## 2024-10-02 RX ADMIN — SERTRALINE HYDROCHLORIDE 100 MG: 50 TABLET ORAL at 01:10

## 2024-10-02 RX ADMIN — ONDANSETRON 4 MG: 2 INJECTION INTRAMUSCULAR; INTRAVENOUS at 02:10

## 2024-10-02 RX ADMIN — MAGNESIUM SULFATE HEPTAHYDRATE 2 G: 40 INJECTION, SOLUTION INTRAVENOUS at 11:10

## 2024-10-02 RX ADMIN — SERTRALINE HYDROCHLORIDE 100 MG: 50 TABLET ORAL at 09:10

## 2024-10-02 NOTE — SUBJECTIVE & OBJECTIVE
Past Medical History:   Diagnosis Date    AICD (automatic cardioverter/defibrillator) present     Asthma     bronchitis in past    Breast cancer 2016    right    Cardiac pacemaker     Cardiomyopathy     COPD (chronic obstructive pulmonary disease)     Diabetes mellitus, type 2     Hyperglycemia     Hyperlipidemia     Hypertension     Malignant neoplasm of overlapping sites of female breast 2016    Nuclear sclerosis of both eyes 2020    Respiratory distress 3/12/2020       Past Surgical History:   Procedure Laterality Date    BIOPSY, WITH CT GUIDANCE Right 2023    Procedure: LUNG MASS BIOPSY, WITH CT GUIDANCE;  Surgeon: Yehuda Green MD;  Location: [facility] CATH LAB;  Service: Radiology;  Laterality: Right;    BREAST BIOPSY Right 2016    IDC    BREAST LUMPECTOMY Right     CARDIAC CATHETERIZATION Bilateral 2017    CARDIAC DEFIBRILLATOR PLACEMENT Left 08/10/2017    CARDIAC DEFIBRILLATOR PLACEMENT Left 2018     SECTION      x2    CHOLECYSTECTOMY      FEMUR BIOPSY Left 2024    Procedure: BIOPSY, FEMUR;  Surgeon: Porter Campos MD;  Location: [facility] OR [address] [place] [address];  Service: Orthopedics;  Laterality: Left;    INSERTION OF TUNNELED CENTRAL VENOUS CATHETER (CVC) WITH SUBCUTANEOUS PORT Right 2020    Procedure: [id number], RIGHT;  Surgeon: Josefa Caceres MD;  [facility];  Service: General;  Laterality: Right;  Port-a-cath placed to R. IJ    INTRAMEDULLARY RODDING OF FEMUR Left 2024    Procedure: INSERTION, INTRAMEDULLARY ANTOINE, FEMUR;  Surgeon: Porter Campos MD;  [facility];  Service: Orthopedics;  Laterality: Left;    LUNG BIOPSY N/A 2020    Procedure: BIOPSY, LUNG;  Surgeon: [facility] Diagnostic Provider;  Location: [facility] OR;  Service: Radiology;  Laterality: N/A;  8AM START  RN PREOP 2020---COVID NEGATIVE    NAVIGATIONAL BRONCHOSCOPY N/A 10/13/2020    Procedure: BRONCHOSCOPY, NAVIGATIONAL;  Surgeon:  Jamilah Weaver MD;  [facility];  Service: Pulmonary;  Laterality: N/A;    REVISION OF IMPLANTABLE CARDIOVERTER-DEFIBRILLATOR (ICD) ELECTRODE LEAD PLACEMENT N/A 6/15/2018    Procedure: REVISION-LEAD-ICD;  Surgeon: Javy Gates MD;  Location: [facility] CATH LAB;  Service: Cardiology;  Laterality: N/A;  LBBB, Bi-V ICD HIS Ld rev, MDT, Choice, MB, 3 Prep    ROBOT-ASSISTED LAPAROSCOPIC LYMPHADENECTOMY USING DA SALVADOR XI Right 10/23/2020    Procedure: XI ROBOTIC LYMPHADENECTOMY;  Surgeon: Ramo Tucker MD;  [facility];  Service: Thoracic;  Laterality: Right;    TUBAL LIGATION         Review of patient's allergies indicates:   Allergen Reactions    Taxol [paclitaxel]      Hypersensitivity reaction to taxol, symptoms included shortness of breath, nausea, dizziness, flushing     Carboplatin Other (See Comments)     Itching and hives    Adhesive Rash     tegaderm burns and blistered skin       Current Facility-Administered Medications on File Prior to Encounter   Medication    fentaNYL injection 25 mcg    haloperidol lactate injection 0.5 mg    HYDROmorphone injection 0.2 mg    ondansetron injection 4 mg    sodium chloride 0.9% flush 10 mL     Current Outpatient Medications on File Prior to Encounter   Medication Sig    ACCU-CHEK ALFRED PLUS TEST STRP Strp USE TO TEST THREE TIMES DAILY    acetaminophen (TYLENOL) 500 MG tablet Take 2 tablets (1,000 mg total) by mouth every 8 (eight) hours as needed for Pain.    albuterol (ACCUNEB) 1.25 mg/3 mL Nebu use 1 AMPULE (3ml) via NEBULIZER EVERY 6 HOURS AS NEEDED    albuterol (VENTOLIN HFA) 90 mcg/actuation inhaler Inhale 2 puffs into the lungs every 4 (four) hours as needed for Wheezing or Shortness of Breath. Rescue    amLODIPine (NORVASC) 5 MG tablet TAKE 1 TABLET BY MOUTH EVERY DAY    atorvastatin (LIPITOR) 10 MG tablet TAKE 1 TABLET BY MOUTH EVERY DAY    benzonatate (TESSALON) 200 MG capsule Take 1 capsule (200 mg total) by mouth 3 (three) times  daily as needed for Cough.    buPROPion (WELLBUTRIN XL) 150 MG TB24 tablet TAKE 1 TABLET BY MOUTH EVERY DAY    cetirizine (ZYRTEC) 10 MG tablet Take 10 mg by mouth every evening.     cholecalciferol, vitamin D3, (VITAMIN D3) 50 mcg (2,000 unit) Tab Take 1 tablet (2,000 Units total) by mouth once daily.    gabapentin (NEURONTIN) 300 MG capsule Take 1 capsule (300 mg total) by mouth 3 (three) times daily.    losartan (COZAAR) 100 MG tablet TAKE 1 TABLET BY MOUTH EVERY DAY    meloxicam (MOBIC) 7.5 MG tablet Take 1 tablet (7.5 mg total) by mouth once daily.    metFORMIN (GLUCOPHAGE) 500 MG tablet TAKE 2 TABLETS BY MOUTH TWICE A DAY WITH MEALS    methocarbamoL (ROBAXIN) 750 MG Tab Take 1 tablet (750 mg total) by mouth 4 (four) times daily. for 10 days    metoprolol succinate (TOPROL-XL) 100 MG 24 hr tablet TAKE 1 TABLET BY MOUTH EVERY DAY    multivitamin (THERAGRAN) per tablet Take 1 tablet by mouth once daily.    nystatin (MYCOSTATIN) powder Apply topically 2 (two) times daily.    ondansetron (ZOFRAN-ODT) 8 MG TbDL Take 1 tablet (8 mg total) by mouth every 8 (eight) hours as needed (nausea/vomiting). Take 1 tablet (8 mg) by mouth every 8 hours as needed for nausea/vomiting.    oxyCODONE (ROXICODONE) 5 MG immediate release tablet Take 1 tablet (5 mg total) by mouth every 6 (six) hours as needed for Pain.    palonosetron (ALOXI) 0.25 mg/5 mL injection     pantoprazole (PROTONIX) 40 MG tablet TAKE 1 TABLET BY MOUTH EVERY DAY    READI-CAT 2 2 % (w/v) suspension     READI-CAT 2 2.1 % (w/v), 2.0 % (w/w) suspension     sertraline (ZOLOFT) 100 MG tablet TAKE 1 TABLET BY MOUTH EVERY DAY IN THE EVENING    tiotropium (SPIRIVA WITH HANDIHALER) 18 mcg inhalation capsule INHALE THE CONTENTS OF 1 CAPSULE BY MOUTH EVERY DAY    WIXELA INHUB 250-50 mcg/dose diskus inhaler INHALE 1 PUFF INTO THE LUNGS 2 (TWO) TIMES DAILY. CONTROLLER     Family History       Problem Relation (Age of Onset)    Cataracts Mother, Father    Clotting disorder  Brother    Kidney disease Mother, Father    Macular degeneration Maternal Grandmother    No Known Problems Daughter, Son, Sister, Maternal Aunt, Maternal Uncle, Paternal Aunt, Paternal Uncle, Maternal Grandfather, Paternal Grandmother, Paternal Grandfather          Tobacco Use    Smoking status: Former     Current packs/day: 0.00     Average packs/day: 0.5 packs/day for 40.0 years (20.0 ttl pk-yrs)     Types: Cigarettes     Start date: 1976     Quit date: 2016     Years since quittin.1     Passive exposure: Past    Smokeless tobacco: Never   Substance and Sexual Activity    Alcohol use: Yes     Alcohol/week: 1.0 standard drink of alcohol     Types: 1 Glasses of wine per week     Comment: rare- holiday    Drug use: No    Sexual activity: Not Currently     Partners: Male     Comment:      Review of Systems   Reason unable to perform ROS: ROS was performed and pertinent +s and -s are listed in HPI.     Objective:     Vital Signs (Most Recent):  Temp: 98.6 °F (37 °C) (10/01/24 2345)  Pulse: 101 (10/01/24 2345)  Resp: 18 (10/01/24 2345)  BP: 118/66 (10/01/24 2345)  SpO2: 95 % (10/01/24 2345) Vital Signs (24h Range):  Temp:  [97.6 °F (36.4 °C)-98.6 °F (37 °C)] 98.6 °F (37 °C)  Pulse:  [] 101  Resp:  [16-26] 18  SpO2:  [93 %-95 %] 95 %  BP: (118-140)/(62-68) 118/66     Weight: 80.5 kg (177 lb 7.5 oz)  Body mass index is 32.46 kg/m².     Physical Exam  Constitutional:       General: She is not in acute distress.     Appearance: She is obese. She is ill-appearing. She is not toxic-appearing or diaphoretic.   HENT:      Head: Atraumatic.   Cardiovascular:      Rate and Rhythm: Normal rate and regular rhythm.      Pulses: Normal pulses.      Heart sounds: Normal heart sounds.   Pulmonary:      Effort: Pulmonary effort is normal.      Breath sounds: Normal breath sounds.   Abdominal:      General: There is no distension.      Tenderness: There is abdominal tenderness (diffuse). There is no guarding or  rebound.   Skin:     General: Skin is warm and dry.      Capillary Refill: Capillary refill takes 2 to 3 seconds.      Coloration: Skin is pale.   Neurological:      General: No focal deficit present.   Psychiatric:         Mood and Affect: Mood normal.         Behavior: Behavior normal.                Significant Labs: All pertinent labs within the past 24 hours have been reviewed.  CBC:   Recent Labs   Lab 09/30/24  1014 10/01/24  0835   WBC 6.91 7.14   HGB 9.2* 9.1*   HCT 31.5* 31.1*    340     CMP:   Recent Labs   Lab 09/30/24  1014 10/01/24  0835   * 133*   K 4.5 4.5    99   CO2 23 25   * 152*   BUN 27* 27*   CREATININE 1.1 1.0   CALCIUM 9.9 9.5   PROT 6.8 6.5   ALBUMIN 2.3* 2.2*   BILITOT 0.3 0.3   ALKPHOS 101 100   AST 22 19   ALT 15 14   ANIONGAP 11 9       Significant Imaging: I have reviewed all pertinent imaging results/findings within the past 24 hours.

## 2024-10-02 NOTE — HPI
Hannah Montoya is a 69 y.o. female with stage IV NSCLC with pathologic fracture (follows everardo Avina), Non-Insulin-dependent DM2 without complications, HTN, HLD, and Depression who presented to [facility] ED on 10/02/2024 with diarrhea and abd cramping.  Her last cycle of ipi/nivo was 9/12/24. Reports she developed diarrhea non bloody 4 days prior to presentation. She reports 6-8 non bloody stools not relieved with loperamide.   She had 1 episode of vomiting with intermittent nausea day prior to her presentation.  She reports some mild abdominal cramping.  No fevers.  Since her hospital admission she has had no bowel movements.  Her last cycle of ipi/nivo was 9/12.  Consulted for lung cancer.

## 2024-10-02 NOTE — SUBJECTIVE & OBJECTIVE
Interval History: reports improvement in diarrhea, occasional cramping abdominal pain. Denies any blood in stools.     Review of Systems   Constitutional:  Negative for chills and fever.   Eyes:  Negative for photophobia and visual disturbance.   Respiratory:  Negative for chest tightness and shortness of breath.    Cardiovascular:  Negative for chest pain and palpitations.   Gastrointestinal:  Positive for abdominal pain. Negative for nausea and vomiting.   Genitourinary:  Negative for dysuria and hematuria.     Objective:     Vital Signs (Most Recent):  Temp: 98.9 °F (37.2 °C) (10/02/24 0800)  Pulse: (!) 112 (10/02/24 0800)  Resp: 19 (10/02/24 0800)  BP: 110/71 (10/02/24 0800)  SpO2: 100 % (10/02/24 0800) Vital Signs (24h Range):  Temp:  [98 °F (36.7 °C)-98.9 °F (37.2 °C)] 98.9 °F (37.2 °C)  Pulse:  [] 112  Resp:  [17-26] 19  SpO2:  [93 %-100 %] 100 %  BP: (110-140)/(62-77) 110/71     Weight: 80.5 kg (177 lb 7.5 oz)  Body mass index is 32.46 kg/m².    Intake/Output Summary (Last 24 hours) at 10/2/2024 1051  Last data filed at 10/2/2024 0800  Gross per 24 hour   Intake 0 ml   Output 100 ml   Net -100 ml         Physical Exam  Vitals and nursing note reviewed.   Constitutional:       General: She is not in acute distress.     Appearance: She is well-developed. She is not diaphoretic.   HENT:      Head: Normocephalic and atraumatic.      Right Ear: External ear normal.      Left Ear: External ear normal.   Eyes:      General:         Right eye: No discharge.         Left eye: No discharge.      Conjunctiva/sclera: Conjunctivae normal.   Neck:      Thyroid: No thyromegaly.   Cardiovascular:      Rate and Rhythm: Normal rate and regular rhythm.      Heart sounds: No murmur heard.  Pulmonary:      Effort: Pulmonary effort is normal. No respiratory distress.      Breath sounds: Normal breath sounds.   Abdominal:      General: Bowel sounds are normal. There is no distension.      Palpations: Abdomen is soft. There  is no mass.      Tenderness: There is abdominal tenderness (mild diffuse abdominal tenderness).   Musculoskeletal:         General: No deformity.      Cervical back: Normal range of motion and neck supple.   Skin:     General: Skin is warm and dry.   Neurological:      Mental Status: She is alert and oriented to person, place, and time.      Sensory: No sensory deficit.   Psychiatric:         Mood and Affect: Mood normal.         Behavior: Behavior normal.             Significant Labs: CBC:   Recent Labs   Lab 10/01/24  0835 10/02/24  0637   WBC 7.14 5.25   HGB 9.1* 7.7*   HCT 31.1* 26.9*    292     CMP:   Recent Labs   Lab 10/01/24  0835 10/02/24  0638   * 134*   K 4.5 4.2   CL 99 103   CO2 25 20*   * 120*   BUN 27* 16   CREATININE 1.0 0.6   CALCIUM 9.5 8.5*   PROT 6.5 5.4*   ALBUMIN 2.2* 1.9*   BILITOT 0.3 0.2   ALKPHOS 100 77   AST 19 16   ALT 14 13   ANIONGAP 9 11     TSH:   Recent Labs   Lab 09/30/24  1014   TSH 5.169*       Significant Imaging:   Imaging Results              CT Abdomen Pelvis With IV Contrast Routine Oral Contrast (Final result)  Result time 10/01/24 20:24:42      Final result by Dwaine Clay MD (10/01/24 20:24:42)                   Impression:      1. No acute intra-abdominal abnormalities identified.  2. Persistent right lower lobe collapse and consolidation with small right pleural effusion.  3. Postsurgical changes and additional findings as detailed above.      Electronically signed by: Dwaine Clay MD  Date:    10/01/2024  Time:    20:24               Narrative:    EXAMINATION:  CT ABDOMEN PELVIS WITH IV CONTRAST    CLINICAL HISTORY:  Abdominal pain, acute, nonlocalized;    TECHNIQUE:  Low dose axial images, sagittal and coronal reformations were obtained from the lung bases to the pubic symphysis following the IV administration of 75 mL of Omnipaque 350 .  Oral contrast was not given.    COMPARISON:  CT chest abdomen pelvis 09/18/2024.    FINDINGS:  Heart is  normal in size.  Pacer wires are partially visualized.  There is persistent right lower lobe collapse/consolidation with small right pleural effusion.  Left lung base is relatively clear.    No significant hepatic abnormalities are identified.  There is no intra-or extrahepatic biliary ductal dilatation.  Gallbladder has been removed.  The stomach, pancreas, and right adrenal gland are unremarkable.  There is mild left adrenal nodular thickening.  Spleen is mildly enlarged measuring 13 cm.    Kidneys enhance normally with no evidence of hydronephrosis.  No abnormalities are seen along the ureteral courses.  Urinary bladder is nondistended.  There is mild lobular configuration of the uterus with suspected small underlying fibroids seen.    Appendix is visualized and is unremarkable.  The visualized loops of small and large bowel show no evidence of obstruction or inflammation.  No free air or free fluid.    Aorta tapers normally.    No acute osseous abnormality identified.  There is lower lumbar facet arthropathy.  Similar appearance of the left hip with associated adjacent subcutaneous soft tissue thickening and stranding.  Postoperative ORIF changes are seen involving the left proximal femur.

## 2024-10-02 NOTE — CONSULTS
[facility]    Patient Complaint: diarrhea, n/v, abdominal pain    PCP:   Emanuel Chavez       LOS: 0        Initial History of Present Illness (HPI):  This is a 69 y.o. 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 PMH tage IV NSCLC with pathologic fracture (follows everardo Fabrice), Non-Insulin-dependent DM2 without complications, HTN, HLD, and Depression. Patient complaint of acute onset of persistent diarrhea with associated symtpoms of dizziness, n/v and abdominal cramping that began 7-10 days after receiving opdivo on . Denies hematemesis, BRBPR, melena, or constipation. Denies sick contacts. Reports the upwards of 10x episodes of watery diarrhea daily.     Labs hgb 7.7 from 9.1 on yesterday, phos 2.5, mg 0.9    Medical History:  has a past medical history of AICD (automatic cardioverter/defibrillator) present, Asthma, Breast cancer (), Cardiac pacemaker, Cardiomyopathy, COPD (chronic obstructive pulmonary disease), Diabetes mellitus, type 2, Hyperglycemia, Hyperlipidemia, Hypertension, Malignant neoplasm of overlapping sites of female breast (2016), Nuclear sclerosis of both eyes (2020), and Respiratory distress (3/12/2020).    Surgical History:  has a past surgical history that includes Cholecystectomy;  section; Tubal ligation; Breast biopsy (Right, 2016); Breast lumpectomy (Right); Revision of implantable cardioverter-defibrillator (ICD) electrode lead placement (N/A, 6/15/2018); Cardiac catheterization (Bilateral, 2017); Cardiac defibrillator placement (Left, 08/10/2017); Cardiac defibrillator placement (Left, 2018); Lung biopsy (N/A, 2020); Navigational bronchoscopy (N/A, 10/13/2020); Robot-assisted laparoscopic lymphadenectomy using da Temi Xi (Right, 10/23/2020); Insertion of tunneled central venous catheter (CVC) with subcutaneous port (Right, 2020); biopsy, with ct guidance (Right,  1/24/2023); Intramedullary rodding of femur (Left, 8/20/2024); and Femur biopsy (Left, 8/20/2024).      Objective Findings:    Vital Signs:  Temp:  [98 °F (36.7 °C)-98.9 °F (37.2 °C)]   Pulse:  []   Resp:  [17-26]   BP: (110-140)/(62-77)   SpO2:  [93 %-100 %]   Body mass index is 32.46 kg/m².      Physical Exam  Vitals and nursing note reviewed.   Constitutional:       Appearance: Normal appearance.   HENT:      Head: Normocephalic.   Pulmonary:      Effort: Pulmonary effort is normal.   Abdominal:      General: Bowel sounds are normal.      Palpations: Abdomen is soft.   Skin:     General: Skin is warm and dry.   Neurological:      Mental Status: She is alert and oriented to person, place, and time.   Psychiatric:         Mood and Affect: Mood normal.         Behavior: Behavior normal.         Thought Content: Thought content normal.         Judgment: Judgment normal.               Labs:  Lab Results   Component Value Date    WBC 5.25 10/02/2024    HGB 7.7 (L) 10/02/2024    HCT 26.9 (L) 10/02/2024     10/02/2024    CHOL 128 06/06/2024    TRIG 177 (H) 06/06/2024    HDL 38 (L) 06/06/2024    ALT 13 10/02/2024    AST 16 10/02/2024     (L) 10/02/2024    K 4.2 10/02/2024     10/02/2024    CREATININE 0.6 10/02/2024    BUN 16 10/02/2024    CO2 20 (L) 10/02/2024    TSH 5.169 (H) 09/30/2024    INR 1.0 08/19/2024    HGBA1C 6.1 (H) 08/19/2024             Imaging: 10/1 CT abdomen pelvis w/IV and oral contrast- No acute intra-abdominal abnormalities identified.   Persistent right lower lobe collapse and consolidation with small right pleural effusion. Postsurgical changes and additional findings as detailed above.      Endoscopy:              Diarrhea. Abdominal pain. N/v.   Plan/ Recommendations:  1.  Reports no stool this am or overnight. Reports she's been having up to 10 watery stools daily since ~1 week after Opdivo, a checkpoint inhibitor medication. . Abdominal imaging is unremarkable.  Agree with stool studies and will follow results. Denies n/v this am. Labs Hep B Surface Ag, Hep B core Ab total, quantiferon gold, HIV, HCV in process.  Plan to follow infectious labs and plan for colonoscopy on Thursday. Ok for clear liquid diet, bowel prep this evening and npo at [place] except for prep.      Thank you so much for allowing us to participate in the care of Hannah Montoya . Please contact us if you have any additional questions.    Jazmine Ng NP  Gastroenterology  [facility] - Med Surg

## 2024-10-02 NOTE — HPI
Hannah Montoya is a 69 y.o. female with  stage IV NSCLC with pathologic fracture (follows w Fabrice), Non-Insulin-dependent DM2 without complications, HTN, HLD, and Depression  who presented to [facility] ED on 10/02/2024 for eval and treatment of nausea and vomiting associated with diffuse abdominal pain.  She started treatment with Opdivo on September 12th and reports feeling well up until a few days prior to presentation: started feeling malaise, nausea, and then diarrhea.  The diarrhea has worsened over the past 2 days such that she has had over 12 episodes in the last 24 hrs.  Described as watery, non-bloody, and foul-smelling.  Daughter reports she is now too weak to get out of bed.  Pt reports having started using Depends, but has soaked through her most recent pad.  There is associated general abdominal cramping.  They deny associated fever, chills, dyspnea, CP, palpitations.  Symptoms are worsened / alleviated by nothing.    ED course notable for the following:  Patient uncomfortable and appears tired.  Afebrile, HDS.  Exam: pt is pale, and has diffuse abdominal tenderness to palpation but no guarding or rebound tenderness.  Labs WBC 6.91 HGB 9.2 .  Na 134 K 4.5 BUN 27 CRT 1.1 albumin 2.3.  CT A/P w con: The visualized loops of small and large bowel show no evidence of obstruction or inflammation.   ED therapy/stabilization measures:  IV fluids and Zofran.  Heme Onc and GI made aware.  They were placed in observation under the care of [facility] for further evaluation and treatment.

## 2024-10-02 NOTE — PLAN OF CARE
Problem: Adult Inpatient Plan of Care  Goal: Plan of Care Review  Reactivated  Goal: Patient-Specific Goal (Individualized)  Reactivated  Goal: Absence of Hospital-Acquired Illness or Injury  Reactivated  Goal: Optimal Comfort and Wellbeing  Reactivated  Goal: Readiness for Transition of Care  Reactivated     Problem: Diabetes Comorbidity  Goal: Blood Glucose Level Within Targeted Range  Reactivated     Problem: Infection  Goal: Absence of Infection Signs and Symptoms  Reactivated     Problem: Skin Injury Risk Increased  Goal: Skin Health and Integrity  Reactivated

## 2024-10-02 NOTE — H&P
[facility] Medicine  History & Physical    Patient Name: Hannah Montoya  MRN: [id number]  Patient Class: OP- Observation  Admission Date: 10/1/2024  Attending Physician: Sydnee Castro DO   Primary Care Provider: Emanuel Chavez MD         Patient information was obtained from patient, relative(s), past medical records, and ER records.     Subjective:     Principal Problem:Acute colitis    Chief Complaint:   Chief Complaint   Patient presents with    Nausea     Pt BIB EMS, c/o n/v/d since last night. Pt denies any CP, SOB, or abd pain.         HPI: Hannah Montoya is a 69 y.o. female with  stage IV NSCLC with pathologic fracture (follows everardo Avina), Non-Insulin-dependent DM2 without complications, HTN, HLD, and Depression  who presented to [facility] ED on 10/02/2024 for eval and treatment of nausea and vomiting associated with diffuse abdominal pain.  She started treatment with Opdivo on September 12th and reports feeling well up until a few days prior to presentation: started feeling malaise, nausea, and then diarrhea.  The diarrhea has worsened over the past 2 days such that she has had over 12 episodes in the last 24 hrs.  Described as watery, non-bloody, and foul-smelling.  Daughter reports she is now too weak to get out of bed.  Pt reports having started using Depends, but has soaked through her most recent pad.  There is associated general abdominal cramping.  They deny associated fever, chills, dyspnea, CP, palpitations.  Symptoms are worsened / alleviated by nothing.    ED course notable for the following:  Patient uncomfortable and appears tired.  Afebrile, HDS.  Exam: pt is pale, and has diffuse abdominal tenderness to palpation but no guarding or rebound tenderness.  Labs WBC 6.91 HGB 9.2 .  Na 134 K 4.5 BUN 27 CRT 1.1 albumin 2.3.  CT A/P w con: The visualized loops of small and large bowel show no evidence of obstruction or inflammation.   ED  therapy/stabilization measures:  IV fluids and Zofran.  Heme Onc and GI made aware.  They were placed in observation under the care of [facility] for further evaluation and treatment.     Past Medical History:   Diagnosis Date    AICD (automatic cardioverter/defibrillator) present     Asthma     bronchitis in past    Breast cancer 2016    right    Cardiac pacemaker     Cardiomyopathy     COPD (chronic obstructive pulmonary disease)     Diabetes mellitus, type 2     Hyperglycemia     Hyperlipidemia     Hypertension     Malignant neoplasm of overlapping sites of female breast 2016    Nuclear sclerosis of both eyes 2020    Respiratory distress 3/12/2020       Past Surgical History:   Procedure Laterality Date    BIOPSY, WITH CT GUIDANCE Right 2023    Procedure: LUNG MASS BIOPSY, WITH CT GUIDANCE;  Surgeon: Yehuda Green MD;  Location: [facility] CATH LAB;  Service: Radiology;  Laterality: Right;    BREAST BIOPSY Right 2016    IDC    BREAST LUMPECTOMY Right     CARDIAC CATHETERIZATION Bilateral 2017    CARDIAC DEFIBRILLATOR PLACEMENT Left 08/10/2017    CARDIAC DEFIBRILLATOR PLACEMENT Left 2018     SECTION      x2    CHOLECYSTECTOMY      FEMUR BIOPSY Left 2024    Procedure: BIOPSY, FEMUR;  Surgeon: Porter Campos MD;  [facility];  Service: Orthopedics;  Laterality: Left;    INSERTION OF TUNNELED CENTRAL VENOUS CATHETER (CVC) WITH SUBCUTANEOUS PORT Right 2020    Procedure: [id number], RIGHT;  Surgeon: Josefa Caceres MD;  [facility];  Service: General;  Laterality: Right;  Port-a-cath placed to R. IJ    INTRAMEDULLARY RODDING OF FEMUR Left 2024    Procedure: INSERTION, INTRAMEDULLARY ANTOINE, FEMUR;  Surgeon: Porter Campos MD;  [facility];  Service: Orthopedics;  Laterality: Left;    LUNG BIOPSY N/A 2020    Procedure: BIOPSY, LUNG;  Surgeon: Dosc Diagnostic  Provider;  Location: [facility] OR;  Service: Radiology;  Laterality: N/A;  8AM START  RN PREOP 5/26/2020---COVID NEGATIVE    NAVIGATIONAL BRONCHOSCOPY N/A 10/13/2020    Procedure: BRONCHOSCOPY, NAVIGATIONAL;  Surgeon: Jamilah Weaver MD;  [facility];  Service: Pulmonary;  Laterality: N/A;    REVISION OF IMPLANTABLE CARDIOVERTER-DEFIBRILLATOR (ICD) ELECTRODE LEAD PLACEMENT N/A 6/15/2018    Procedure: REVISION-LEAD-ICD;  Surgeon: Javy Gates MD;  Location: [facility] CATH LAB;  Service: Cardiology;  Laterality: N/A;  LBBB, Bi-V ICD HIS Ld rev, MDT, Choice, MB, 3 Prep    ROBOT-ASSISTED LAPAROSCOPIC LYMPHADENECTOMY USING DA SALVADOR XI Right 10/23/2020    Procedure: XI ROBOTIC LYMPHADENECTOMY;  Surgeon: Ramo Tucker MD;  [facility];  Service: Thoracic;  Laterality: Right;    TUBAL LIGATION         Review of patient's allergies indicates:   Allergen Reactions    Taxol [paclitaxel]      Hypersensitivity reaction to taxol, symptoms included shortness of breath, nausea, dizziness, flushing     Carboplatin Other (See Comments)     Itching and hives    Adhesive Rash     tegaderm burns and blistered skin       Current Facility-Administered Medications on File Prior to Encounter   Medication    fentaNYL injection 25 mcg    haloperidol lactate injection 0.5 mg    HYDROmorphone injection 0.2 mg    ondansetron injection 4 mg    sodium chloride 0.9% flush 10 mL     Current Outpatient Medications on File Prior to Encounter   Medication Sig    ACCU-CHEK ALFRED PLUS TEST STRP Strp USE TO TEST THREE TIMES DAILY    acetaminophen (TYLENOL) 500 MG tablet Take 2 tablets (1,000 mg total) by mouth every 8 (eight) hours as needed for Pain.    albuterol (ACCUNEB) 1.25 mg/3 mL Nebu use 1 AMPULE (3ml) via NEBULIZER EVERY 6 HOURS AS NEEDED    albuterol (VENTOLIN HFA) 90 mcg/actuation inhaler Inhale 2 puffs into the lungs every 4 (four) hours as needed for Wheezing or Shortness of Breath. Rescue    amLODIPine (NORVASC)  5 MG tablet TAKE 1 TABLET BY MOUTH EVERY DAY    atorvastatin (LIPITOR) 10 MG tablet TAKE 1 TABLET BY MOUTH EVERY DAY    benzonatate (TESSALON) 200 MG capsule Take 1 capsule (200 mg total) by mouth 3 (three) times daily as needed for Cough.    buPROPion (WELLBUTRIN XL) 150 MG TB24 tablet TAKE 1 TABLET BY MOUTH EVERY DAY    cetirizine (ZYRTEC) 10 MG tablet Take 10 mg by mouth every evening.     cholecalciferol, vitamin D3, (VITAMIN D3) 50 mcg (2,000 unit) Tab Take 1 tablet (2,000 Units total) by mouth once daily.    gabapentin (NEURONTIN) 300 MG capsule Take 1 capsule (300 mg total) by mouth 3 (three) times daily.    losartan (COZAAR) 100 MG tablet TAKE 1 TABLET BY MOUTH EVERY DAY    meloxicam (MOBIC) 7.5 MG tablet Take 1 tablet (7.5 mg total) by mouth once daily.    metFORMIN (GLUCOPHAGE) 500 MG tablet TAKE 2 TABLETS BY MOUTH TWICE A DAY WITH MEALS    methocarbamoL (ROBAXIN) 750 MG Tab Take 1 tablet (750 mg total) by mouth 4 (four) times daily. for 10 days    metoprolol succinate (TOPROL-XL) 100 MG 24 hr tablet TAKE 1 TABLET BY MOUTH EVERY DAY    multivitamin (THERAGRAN) per tablet Take 1 tablet by mouth once daily.    nystatin (MYCOSTATIN) powder Apply topically 2 (two) times daily.    ondansetron (ZOFRAN-ODT) 8 MG TbDL Take 1 tablet (8 mg total) by mouth every 8 (eight) hours as needed (nausea/vomiting). Take 1 tablet (8 mg) by mouth every 8 hours as needed for nausea/vomiting.    oxyCODONE (ROXICODONE) 5 MG immediate release tablet Take 1 tablet (5 mg total) by mouth every 6 (six) hours as needed for Pain.    palonosetron (ALOXI) 0.25 mg/5 mL injection     pantoprazole (PROTONIX) 40 MG tablet TAKE 1 TABLET BY MOUTH EVERY DAY    READI-CAT 2 2 % (w/v) suspension     READI-CAT 2 2.1 % (w/v), 2.0 % (w/w) suspension     sertraline (ZOLOFT) 100 MG tablet TAKE 1 TABLET BY MOUTH EVERY DAY IN THE EVENING    tiotropium (SPIRIVA WITH HANDIHALER) 18 mcg inhalation capsule INHALE THE CONTENTS OF 1 CAPSULE BY MOUTH EVERY DAY     WIXELA INHUB 250-50 mcg/dose diskus inhaler INHALE 1 PUFF INTO THE LUNGS 2 (TWO) TIMES DAILY. CONTROLLER     Family History       Problem Relation (Age of Onset)    Cataracts Mother, Father    Clotting disorder Brother    Kidney disease Mother, Father    Macular degeneration Maternal Grandmother    No Known Problems Daughter, Son, Sister, Maternal Aunt, Maternal Uncle, Paternal Aunt, Paternal Uncle, Maternal Grandfather, Paternal Grandmother, Paternal Grandfather          Tobacco Use    Smoking status: Former     Current packs/day: 0.00     Average packs/day: 0.5 packs/day for 40.0 years (20.0 ttl pk-yrs)     Types: Cigarettes     Start date: 1976     Quit date: 2016     Years since quittin.1     Passive exposure: Past    Smokeless tobacco: Never   Substance and Sexual Activity    Alcohol use: Yes     Alcohol/week: 1.0 standard drink of alcohol     Types: 1 Glasses of wine per week     Comment: rare- holiday    Drug use: No    Sexual activity: Not Currently     Partners: Male     Comment:      Review of Systems   Reason unable to perform ROS: ROS was performed and pertinent +s and -s are listed in HPI.     Objective:     Vital Signs (Most Recent):  Temp: 98.6 °F (37 °C) (10/01/24 2345)  Pulse: 101 (10/01/24 2345)  Resp: 18 (10/01/24 2345)  BP: 118/66 (10/01/24 2345)  SpO2: 95 % (10/01/24 2345) Vital Signs (24h Range):  Temp:  [97.6 °F (36.4 °C)-98.6 °F (37 °C)] 98.6 °F (37 °C)  Pulse:  [] 101  Resp:  [16-26] 18  SpO2:  [93 %-95 %] 95 %  BP: (118-140)/(62-68) 118/66     Weight: 80.5 kg (177 lb 7.5 oz)  Body mass index is 32.46 kg/m².     Physical Exam  Constitutional:       General: She is not in acute distress.     Appearance: She is obese. She is ill-appearing. She is not toxic-appearing or diaphoretic.   HENT:      Head: Atraumatic.   Cardiovascular:      Rate and Rhythm: Normal rate and regular rhythm.      Pulses: Normal pulses.      Heart sounds: Normal heart sounds.   Pulmonary:       Effort: Pulmonary effort is normal.      Breath sounds: Normal breath sounds.   Abdominal:      General: There is no distension.      Tenderness: There is abdominal tenderness (diffuse). There is no guarding or rebound.   Skin:     General: Skin is warm and dry.      Capillary Refill: Capillary refill takes 2 to 3 seconds.      Coloration: Skin is pale.   Neurological:      General: No focal deficit present.   Psychiatric:         Mood and Affect: Mood normal.         Behavior: Behavior normal.                Significant Labs: All pertinent labs within the past 24 hours have been reviewed.  CBC:   Recent Labs   Lab 09/30/24  1014 10/01/24  0835   WBC 6.91 7.14   HGB 9.2* 9.1*   HCT 31.5* 31.1*    340     CMP:   Recent Labs   Lab 09/30/24  1014 10/01/24  0835   * 133*   K 4.5 4.5    99   CO2 23 25   * 152*   BUN 27* 27*   CREATININE 1.1 1.0   CALCIUM 9.9 9.5   PROT 6.8 6.5   ALBUMIN 2.3* 2.2*   BILITOT 0.3 0.3   ALKPHOS 101 100   AST 22 19   ALT 15 14   ANIONGAP 11 9       Significant Imaging: I have reviewed all pertinent imaging results/findings within the past 24 hours.  Assessment/Plan:     * Acute colitis  69F with stage IV lung cancer who on September 12th was given nivolumab 360 (Opdivo, an immune checkpoint inhibitor) now presents with several days of worsening diarrhea, and over 12 reported episodes over the last 24 hours.  Watery and non-bloody.  Associated with diffuse cramping.  Denies fever, chills, recent change in diet.  CT A/P w con: The visualized loops of small and large bowel show no evidence of obstruction or inflammation.     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  Initiating evaluation and treatment for this per MD Javier algorithm ([email or web address])    GI and Heme-Onc consulted; greatly appreciate  "recs and assistance  Stool studies: C. diff, stool culture, calprotectin, lactoferrin, fecal elastase, and qualitative fecal fat  If alternative cause of colitis not found, then anticipate starting infliximab  Labs for anticipated use of infliximab: Hep B Surface Ag, Hep B core Ab total, quantiferon gold, HIV, HCV  IV fluids running  Antinausea in place; deferring Imodium to GI given possible scope and need for good visualization  NPO in anticipation of possible scope          NSCLC of right lung  Follows w Dr. Avina and Aide Magaña, [facility], who is following and providing much-appreciated assistance.      Lytic bone lesion of left femur s/p IM nail on 8/20/2024  Continue home Oxycodone 5 mg q4h PRN      Moderate episode of recurrent major depressive disorder  Continue home Wellbutrin      Class 2 severe obesity due to excess calories with serious comorbidity in adult  Body mass index is 32.46 kg/m². Morbid obesity complicates all aspects of disease management from diagnostic modalities to treatment. Weight loss encouraged and health benefits explained to patient.         NICM (nonischemic cardiomyopathy)  Stable on admission.  Transitioning Toprol 100 to Lopressor 25 QID until she is more stable volume-wise.      Type 2 diabetes mellitus without complication  Patient's FSGs are controlled on current medication regimen.  Last A1c reviewed-   Lab Results   Component Value Date    HGBA1C 6.1 (H) 08/19/2024     Most recent fingerstick glucose reviewed- No results for input(s): "POCTGLUCOSE" in the last 24 hours.  Current correctional scale  Low  Maintain anti-hyperglycemic dose as follows-   Antihyperglycemics (From admission, onward)      Start     Stop Route Frequency Ordered    10/02/24 0226  insulin aspart U-100 pen 0-5 Units         -- SubQ Every 6 hours PRN 10/02/24 0127          Hold Oral hypoglycemics while patient is in the hospital.    Essential hypertension  Chronic, controlled. Latest blood pressure and " vitals reviewed-     Temp:  [97.6 °F (36.4 °C)-98.6 °F (37 °C)]   Pulse:  []   Resp:  [16-26]   BP: (118-140)/(62-68)   SpO2:  [93 %-95 %] .   Home meds for hypertension were reviewed and noted below.   Hypertension Medications               amLODIPine (NORVASC) 5 MG tablet TAKE 1 TABLET BY MOUTH EVERY DAY    losartan (COZAAR) 100 MG tablet TAKE 1 TABLET BY MOUTH EVERY DAY    metoprolol succinate (TOPROL-XL) 100 MG 24 hr tablet TAKE 1 TABLET BY MOUTH EVERY DAY            While in the hospital, will manage blood pressure as follows; Adjust home antihypertensive regimen as follows- hold amlodipine and switch Toprol 100 to Lopressor 25 QID until her volume status is more stable    Will utilize p.r.n. blood pressure medication only if patient's blood pressure greater than 180/110 and she develops symptoms such as worsening chest pain or shortness of breath.      VTE Risk Mitigation (From admission, onward)           Ordered     enoxaparin injection 40 mg  Daily         10/01/24 1934     IP VTE HIGH RISK PATIENT  Once         10/01/24 1934     Place sequential compression device  Until discontinued         10/01/24 1934                       On 10/02/2024, patient should be placed in hospital observation services under my care.             Josr Brock MD  Department of Hospital Medicine  [facility] - Med Surg

## 2024-10-02 NOTE — CARE UPDATE
npatient Upgrade Note     Hannah Montoya has warranted treatment spanning two or more midnights of hospital level care for the management of Acute colitis . She continues to require IV fluids, daily labs, further testing/imaging, monitoring of vital signs, advancing diet, medication adjustments, and further evaluation by consultants. Her condition is also complicated by the following comorbidities: stage IV NSCLC with pathologic fracture (follows everardo Avina), Non-Insulin-dependent DM2 without complications, HTN, HLD, and Depression.      Javi Rodríguez PA-C  Department of Hospital Medicine   [facility]

## 2024-10-02 NOTE — NURSING
[facility]  Nurses Note -- 4 Eyes      10/2/2024       Skin assessed on: Q Shift      [x] No Pressure Injuries Present    []Prevention Measures Documented    [] Yes LDA  for Pressure Injury Previously documented     [] Yes New Pressure Injury Discovered   [] LDA for New Pressure Injury Added      Attending RN:  Cory Anderson LPN     Second RN:  Nely Manzanares RN

## 2024-10-02 NOTE — PLAN OF CARE
Inpatient Upgrade Note    Hannah Montoya has warranted treatment spanning two or more midnights of hospital level care for the management of Acute colitis . She continues to require IV fluids, daily labs, further testing/imaging, monitoring of vital signs, advancing diet, medication adjustments, and further evaluation by consultants. Her condition is also complicated by the following comorbidities: stage IV NSCLC with pathologic fracture (follows everardo Avina), Non-Insulin-dependent DM2 without complications, HTN, HLD, and Depression .

## 2024-10-02 NOTE — NURSING
[facility]  Nurses Note -- 4 Eyes      10/2/2024       Skin assessed on: Admit      [x] No Pressure Injuries Present    []Prevention Measures Documented    [] Yes LDA  for Pressure Injury Previously documented     [] Yes New Pressure Injury Discovered   [] LDA for New Pressure Injury Added      Attending RN:  Zoey Arriola RN     Second RN:  Anne Marie INMAN RN

## 2024-10-02 NOTE — SUBJECTIVE & OBJECTIVE
The patient location is: home  The chief complaint leading to consultation is: diarrhea    Visit type: audiovisual    Face to Face time with patient: 15 min  60 minutes of total time spent on the encounter, which includes face to face time and non-face to face time preparing to see the patient (eg, review of tests), Obtaining and/or reviewing separately obtained history, Documenting clinical information in the electronic or other health record, Independently interpreting results (not separately reported) and communicating results to the patient/family/caregiver, or Care coordination (not separately reported).         Each patient to whom he or she provides medical services by telemedicine is:  (1) informed of the relationship between the physician and patient and the respective role of any other health care provider with respect to management of the patient; and (2) notified that he or she may decline to receive medical services by telemedicine and may withdraw from such care at any time.    Notes:          Oncology Treatment Plan:   OP NSCLC NIVOLUMAB 360 MG D1 & D22 WITH IPILIMUMAB 1 MG/KG Q6W PLUS CARBOPLATIN (AUC) PACLITAXEL Q3W X2 DOSES    Medications:  Continuous Infusions:   lactated ringers   Intravenous Continuous 150 mL/hr at 10/02/24 0541 New Bag at 10/02/24 0541     Scheduled Meds:   arformoteroL  15 mcg Nebulization BID    atorvastatin  10 mg Oral Daily    budesonide  0.5 mg Nebulization Q12H    buPROPion  150 mg Oral Daily    cetirizine  10 mg Oral QHS    enoxparin  40 mg Subcutaneous Daily    gabapentin  300 mg Oral TID    ipratropium  0.5 mg Nebulization Q6H    losartan  100 mg Oral Daily    meloxicam  7.5 mg Oral Daily    methocarbamoL  750 mg Oral QID    metoprolol tartrate  25 mg Oral QID    miconazole NITRATE 2 %   Topical (Top) BID    multivitamin  1 tablet Oral Daily    pantoprazole  40 mg Oral Daily    polyethylene glycol  2,000 mL Oral Once    [START ON 10/3/2024] polyethylene glycol  2,000  mL Oral Once    sertraline  100 mg Oral QHS     PRN Meds:  Current Facility-Administered Medications:     acetaminophen, 1,000 mg, Oral, Q8H PRN    albuterol sulfate, 2.5 mg, Nebulization, Q4H PRN    aluminum-magnesium hydroxide-simethicone, 30 mL, Oral, Q6H PRN    benzonatate, 200 mg, Oral, TID PRN    dextrose 10%, 12.5 g, Intravenous, PRN    dextrose 10%, 12.5 g, Intravenous, PRN    dextrose 10%, 25 g, Intravenous, PRN    glucagon (human recombinant), 1 mg, Intramuscular, PRN    glucagon (human recombinant), 1 mg, Intramuscular, PRN    glucose, 16 g, Oral, PRN    glucose, 24 g, Oral, PRN    insulin aspart U-100, 0-5 Units, Subcutaneous, Q6H PRN    melatonin, 6 mg, Oral, Nightly PRN    naloxone, 0.02 mg, Intravenous, PRN    oxyCODONE, 5 mg, Oral, Q4H PRN    sodium chloride 0.9%, 10 mL, Intravenous, Q12H PRN     Review of patient's allergies indicates:   Allergen Reactions    Taxol [paclitaxel]      Hypersensitivity reaction to taxol, symptoms included shortness of breath, nausea, dizziness, flushing     Carboplatin Other (See Comments)     Itching and hives    Adhesive Rash     tegaderm burns and blistered skin        Past Medical History:   Diagnosis Date    AICD (automatic cardioverter/defibrillator) present     Asthma     bronchitis in past    Breast cancer 2016    right    Cardiac pacemaker     Cardiomyopathy     COPD (chronic obstructive pulmonary disease)     Diabetes mellitus, type 2     Hyperglycemia     Hyperlipidemia     Hypertension     Malignant neoplasm of overlapping sites of female breast 2/12/2016    Nuclear sclerosis of both eyes 8/12/2020    Respiratory distress 3/12/2020     Past Surgical History:   Procedure Laterality Date    BIOPSY, WITH CT GUIDANCE Right 1/24/2023    Procedure: LUNG MASS BIOPSY, WITH CT GUIDANCE;  Surgeon: Yehuda Green MD;  Location: [facility] CATH LAB;  Service: Radiology;  Laterality: Right;    BREAST BIOPSY Right 2/2016    IDC    BREAST LUMPECTOMY Right     CARDIAC  CATHETERIZATION Bilateral 2017    CARDIAC DEFIBRILLATOR PLACEMENT Left 08/10/2017    CARDIAC DEFIBRILLATOR PLACEMENT Left 2018     SECTION      x2    CHOLECYSTECTOMY      FEMUR BIOPSY Left 2024    Procedure: BIOPSY, FEMUR;  Surgeon: Porter Campos MD;  Location: [address];  Service: Orthopedics;  Laterality: Left;    INSERTION OF TUNNELED CENTRAL VENOUS CATHETER (CVC) WITH SUBCUTANEOUS PORT Right 2020    Procedure: [id number], RIGHT;  Surgeon: Josefa Caceres MD;  Location: [address];  Service: General;  Laterality: Right;  Port-a-cath placed to R. IJ    INTRAMEDULLARY RODDING OF FEMUR Left 2024    Procedure: INSERTION, INTRAMEDULLARY ANTOINE, FEMUR;  Surgeon: Porter Campos MD;  Location: [address];  Service: Orthopedics;  Laterality: Left;    LUNG BIOPSY N/A 2020    Procedure: BIOPSY, LUNG;  Surgeon: [facility];  Location: [facility] OR;  Service: Radiology;  Laterality: N/A;  8AM START  RN PREOP 2020---COVID NEGATIVE    NAVIGATIONAL BRONCHOSCOPY N/A 10/13/2020    Procedure: BRONCHOSCOPY, NAVIGATIONAL;  Surgeon: Jamilah Weaver MD;  Location: [address];  Service: Pulmonary;  Laterality: N/A;    REVISION OF IMPLANTABLE CARDIOVERTER-DEFIBRILLATOR (ICD) ELECTRODE LEAD PLACEMENT N/A 6/15/2018    Procedure: REVISION-LEAD-ICD;  Surgeon: Javy Gates MD;  Location: [facility] CATH LAB;  Service: Cardiology;  Laterality: N/A;  LBBB, Bi-V ICD HIS Ld MD julianT, Choice, MB, 3 Prep    ROBOT-ASSISTED LAPAROSCOPIC LYMPHADENECTOMY USING DA SALVADOR XI Right 10/23/2020    Procedure: XI ROBOTIC LYMPHADENECTOMY;  Surgeon: Ramo Tucker MD;  Location: [address];  Service: Thoracic;  Laterality: Right;    TUBAL LIGATION       Family History       Problem Relation (Age of Onset)    Cataracts Mother, Father    Clotting disorder Brother    Kidney disease Mother, Father    Macular degeneration Maternal Grandmother    No  Known Problems Daughter, Son, Sister, Maternal Aunt, Maternal Uncle, Paternal Aunt, Paternal Uncle, Maternal Grandfather, Paternal Grandmother, Paternal Grandfather          Tobacco Use    Smoking status: Former     Current packs/day: 0.00     Average packs/day: 0.5 packs/day for 40.0 years (20.0 ttl pk-yrs)     Types: Cigarettes     Start date: 1976     Quit date: 2016     Years since quittin.1     Passive exposure: Past    Smokeless tobacco: Never   Substance and Sexual Activity    Alcohol use: Yes     Alcohol/week: 1.0 standard drink of alcohol     Types: 1 Glasses of wine per week     Comment: rare- holiday    Drug use: No    Sexual activity: Not Currently     Partners: Male     Comment:        Review of Systems   Constitutional:  Positive for appetite change and fatigue. Negative for chills and fever.   HENT:  Negative for nosebleeds.    Eyes:  Negative for photophobia and visual disturbance.   Respiratory:  Negative for chest tightness and shortness of breath.    Cardiovascular:  Negative for chest pain and palpitations.   Gastrointestinal:  Positive for diarrhea (improved), nausea and vomiting.        Abdominal cramping, resolved   Genitourinary:  Negative for dysuria and hematuria.   Neurological:  Positive for weakness.   Psychiatric/Behavioral:  The patient is nervous/anxious.      Objective:     Vital Signs (Most Recent):  Temp: 98.6 °F (37 °C) (10/02/24 1138)  Pulse: (!) 121 (10/02/24 1354)  Resp: 18 (10/02/24 1354)  BP: (!) 146/80 (10/02/24 1138)  SpO2: 95 % (10/02/24 1354) Vital Signs (24h Range):  Temp:  [98 °F (36.7 °C)-98.9 °F (37.2 °C)] 98.6 °F (37 °C)  Pulse:  [100-121] 121  Resp:  [17-20] 18  SpO2:  [94 %-100 %] 95 %  BP: (110-146)/(63-80) 146/80     Weight: 80.5 kg (177 lb 7.5 oz)  Body mass index is 32.46 kg/m².  Body surface area is 1.88 meters squared.      Intake/Output Summary (Last 24 hours) at 10/2/2024 0584  Last data filed at 10/2/2024 0800  Gross per 24 hour   Intake  0 ml   Output 100 ml   Net -100 ml        Physical Exam     Significant Labs:   All pertinent labs from the last 24 hours have been reviewed.    Diagnostic Results:  I have reviewed and interpreted all pertinent imaging results/findings within the past 24 hours.

## 2024-10-02 NOTE — CONSULTS
[facility] Surg  Hematology/Oncology  Consult Note    Patient Name: Hannah Montoya  MRN: [id number]  Admission Date: 10/1/2024  Hospital Length of Stay: 0 days  Code Status: Full Code   Attending Provider: Sydnee Castro DO  Consulting Provider: Eulalia Rocha MD  Primary Care Physician: Emanuel Chavez MD  Principal Problem:Acute colitis    Inpatient consult to Telemedicine - Oncology  Consult performed by: Eulalia Rocha MD  Consult ordered by: Eulalia Rocha MD        Subjective:     HPI:  Hannah Montoya is a 69 y.o. female with stage IV NSCLC with pathologic fracture (follows everardo Avina), Non-Insulin-dependent DM2 without complications, HTN, HLD, and Depression who presented to [facility] ED on 10/02/2024 with diarrhea and abd cramping.  Her last cycle of ipi/nivo was 9/12/24. Reports she developed diarrhea non bloody 4 days prior to presentation. She reports 6-8 non bloody stools not relieved with loperamide.   She had 1 episode of vomiting with intermittent nausea day prior to her presentation.  She reports some mild abdominal cramping.  No fevers.  Since her hospital admission she has had no bowel movements.  Her last cycle of ipi/nivo was 9/12.  Consulted for lung cancer.     The patient location is: home  The chief complaint leading to consultation is: diarrhea    Visit type: audiovisual    Face to Face time with patient: 15 min  60 minutes of total time spent on the encounter, which includes face to face time and non-face to face time preparing to see the patient (eg, review of tests), Obtaining and/or reviewing separately obtained history, Documenting clinical information in the electronic or other health record, Independently interpreting results (not separately reported) and communicating results to the patient/family/caregiver, or Care coordination (not separately reported).         Each patient to whom he or she provides medical services by telemedicine is:  (1)  informed of the relationship between the physician and patient and the respective role of any other health care provider with respect to management of the patient; and (2) notified that he or she may decline to receive medical services by telemedicine and may withdraw from such care at any time.    Notes:          Oncology Treatment Plan:   OP NSCLC NIVOLUMAB 360 MG D1 & D22 WITH IPILIMUMAB 1 MG/KG Q6W PLUS CARBOPLATIN (AUC) PACLITAXEL Q3W X2 DOSES    Medications:  Continuous Infusions:   lactated ringers   Intravenous Continuous 150 mL/hr at 10/02/24 0541 New Bag at 10/02/24 0541     Scheduled Meds:   arformoteroL  15 mcg Nebulization BID    atorvastatin  10 mg Oral Daily    budesonide  0.5 mg Nebulization Q12H    buPROPion  150 mg Oral Daily    cetirizine  10 mg Oral QHS    enoxparin  40 mg Subcutaneous Daily    gabapentin  300 mg Oral TID    ipratropium  0.5 mg Nebulization Q6H    losartan  100 mg Oral Daily    meloxicam  7.5 mg Oral Daily    methocarbamoL  750 mg Oral QID    metoprolol tartrate  25 mg Oral QID    miconazole NITRATE 2 %   Topical (Top) BID    multivitamin  1 tablet Oral Daily    pantoprazole  40 mg Oral Daily    polyethylene glycol  2,000 mL Oral Once    [START ON 10/3/2024] polyethylene glycol  2,000 mL Oral Once    sertraline  100 mg Oral QHS     PRN Meds:  Current Facility-Administered Medications:     acetaminophen, 1,000 mg, Oral, Q8H PRN    albuterol sulfate, 2.5 mg, Nebulization, Q4H PRN    aluminum-magnesium hydroxide-simethicone, 30 mL, Oral, Q6H PRN    benzonatate, 200 mg, Oral, TID PRN    dextrose 10%, 12.5 g, Intravenous, PRN    dextrose 10%, 12.5 g, Intravenous, PRN    dextrose 10%, 25 g, Intravenous, PRN    glucagon (human recombinant), 1 mg, Intramuscular, PRN    glucagon (human recombinant), 1 mg, Intramuscular, PRN    glucose, 16 g, Oral, PRN    glucose, 24 g, Oral, PRN    insulin aspart U-100, 0-5 Units, Subcutaneous, Q6H PRN    melatonin, 6 mg, Oral, Nightly PRN    naloxone,  0.02 mg, Intravenous, PRN    oxyCODONE, 5 mg, Oral, Q4H PRN    sodium chloride 0.9%, 10 mL, Intravenous, Q12H PRN     Review of patient's allergies indicates:   Allergen Reactions    Taxol [paclitaxel]      Hypersensitivity reaction to taxol, symptoms included shortness of breath, nausea, dizziness, flushing     Carboplatin Other (See Comments)     Itching and hives    Adhesive Rash     tegaderm burns and blistered skin        Past Medical History:   Diagnosis Date    AICD (automatic cardioverter/defibrillator) present     Asthma     bronchitis in past    Breast cancer 2016    right    Cardiac pacemaker     Cardiomyopathy     COPD (chronic obstructive pulmonary disease)     Diabetes mellitus, type 2     Hyperglycemia     Hyperlipidemia     Hypertension     Malignant neoplasm of overlapping sites of female breast 2016    Nuclear sclerosis of both eyes 2020    Respiratory distress 3/12/2020     Past Surgical History:   Procedure Laterality Date    BIOPSY, WITH CT GUIDANCE Right 2023    Procedure: LUNG MASS BIOPSY, WITH CT GUIDANCE;  Surgeon: Yehuda Green MD;  Location: [facility] CATH LAB;  Service: Radiology;  Laterality: Right;    BREAST BIOPSY Right 2016    IDC    BREAST LUMPECTOMY Right     CARDIAC CATHETERIZATION Bilateral 2017    CARDIAC DEFIBRILLATOR PLACEMENT Left 08/10/2017    CARDIAC DEFIBRILLATOR PLACEMENT Left 2018     SECTION      x2    CHOLECYSTECTOMY      FEMUR BIOPSY Left 2024    Procedure: BIOPSY, FEMUR;  Surgeon: Porter Campos MD;  Location: [facility] OR [address] [place] [address];  Service: Orthopedics;  Laterality: Left;    INSERTION OF TUNNELED CENTRAL VENOUS CATHETER (CVC) WITH SUBCUTANEOUS PORT Right 2020    Procedure: [id number], RIGHT;  Surgeon: Josefa Caceres MD;  Location: [facility] OR [address] [place] [address];  Service: General;  Laterality: Right;  Port-a-cath placed to R. IJ    INTRAMEDULLARY RODDING OF FEMUR Left 2024    Procedure: INSERTION,  INTRAMEDULLARY ANTOINE, FEMUR;  Surgeon: Porter Campos MD;  [address];  Service: Orthopedics;  Laterality: Left;    LUNG BIOPSY N/A 2020    Procedure: BIOPSY, LUNG;  Surgeon: Kalpesh Diagnostic Provider;  Location: [facility];  Service: Radiology;  Laterality: N/A;  8AM START  RN PREOP 2020---COVID NEGATIVE    NAVIGATIONAL BRONCHOSCOPY N/A 10/13/2020    Procedure: BRONCHOSCOPY, NAVIGATIONAL;  Surgeon: Jamilah Weaver MD;  [facility];  Service: Pulmonary;  Laterality: N/A;    REVISION OF IMPLANTABLE CARDIOVERTER-DEFIBRILLATOR (ICD) ELECTRODE LEAD PLACEMENT N/A 6/15/2018    Procedure: REVISION-LEAD-ICD;  Surgeon: Javy Gates MD;  Location: [facility] CATH LAB;  Service: Cardiology;  Laterality: N/A;  LBBB, Bi-V ICD HIS Ld rev, MDT, Choice, MB, 3 Prep    ROBOT-ASSISTED LAPAROSCOPIC LYMPHADENECTOMY USING DA SALVADOR XI Right 10/23/2020    Procedure: XI ROBOTIC LYMPHADENECTOMY;  Surgeon: Ramo Tucker MD;  Location: [address];  Service: Thoracic;  Laterality: Right;    TUBAL LIGATION       Family History       Problem Relation (Age of Onset)    Cataracts Mother, Father    Clotting disorder Brother    Kidney disease Mother, Father    Macular degeneration Maternal Grandmother    No Known Problems Daughter, Son, Sister, Maternal Aunt, Maternal Uncle, Paternal Aunt, Paternal Uncle, Maternal Grandfather, Paternal Grandmother, Paternal Grandfather          Tobacco Use    Smoking status: Former     Current packs/day: 0.00     Average packs/day: 0.5 packs/day for 40.0 years (20.0 ttl pk-yrs)     Types: Cigarettes     Start date: 1976     Quit date: 2016     Years since quittin.1     Passive exposure: Past    Smokeless tobacco: Never   Substance and Sexual Activity    Alcohol use: Yes     Alcohol/week: 1.0 standard drink of alcohol     Types: 1 Glasses of wine per week     Comment: rare- holiday    Drug use: No    Sexual activity: Not Currently     Partners: Male      Comment:        Review of Systems   Constitutional:  Positive for appetite change and fatigue. Negative for chills and fever.   HENT:  Negative for nosebleeds.    Eyes:  Negative for photophobia and visual disturbance.   Respiratory:  Negative for chest tightness and shortness of breath.    Cardiovascular:  Negative for chest pain and palpitations.   Gastrointestinal:  Positive for diarrhea (improved), nausea and vomiting.        Abdominal cramping, resolved   Genitourinary:  Negative for dysuria and hematuria.   Neurological:  Positive for weakness.   Psychiatric/Behavioral:  The patient is nervous/anxious.      Objective:     Vital Signs (Most Recent):  Temp: 98.6 °F (37 °C) (10/02/24 1138)  Pulse: (!) 121 (10/02/24 1354)  Resp: 18 (10/02/24 1354)  BP: (!) 146/80 (10/02/24 1138)  SpO2: 95 % (10/02/24 1354) Vital Signs (24h Range):  Temp:  [98 °F (36.7 °C)-98.9 °F (37.2 °C)] 98.6 °F (37 °C)  Pulse:  [100-121] 121  Resp:  [17-20] 18  SpO2:  [94 %-100 %] 95 %  BP: (110-146)/(63-80) 146/80     Weight: 80.5 kg (177 lb 7.5 oz)  Body mass index is 32.46 kg/m².  Body surface area is 1.88 meters squared.      Intake/Output Summary (Last 24 hours) at 10/2/2024 1545  Last data filed at 10/2/2024 0800  Gross per 24 hour   Intake 0 ml   Output 100 ml   Net -100 ml        Physical Exam     Significant Labs:   All pertinent labs from the last 24 hours have been reviewed.    Diagnostic Results:  I have reviewed and interpreted all pertinent imaging results/findings within the past 24 hours.  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 Rose as outpatient        Thank you for your consult. I will follow-up with patient. Please contact us if you have any additional questions.    Eulalia Rocha MD  Hematology/Oncology  [facility] Surg

## 2024-10-02 NOTE — ED NOTES
Report received from AILYN Head. Care taken over. Pt awake and alert; resting quietly on stretcher.  Pt remains on continuous cardiac and pulse ox monitoring with non-invasive blood pressure to cycle every 30 minutes.  VS stable; NSR noted. Pt denies pain at this time; no acute distress or discomfort reported or observed.  Pt denies restroom needs at this time; is able to reposition self on stretcher. Bed locked in lowest position; side rails up and locked x 2; call light, bedside table, and personal belongings within reach. Room assessed for safety measures and cleanliness; no action needed at this time. Plan of care discussed with Pt and daughter.  Pt instructed to alert nurse for assistance and before attempting to get out of bed; verbalizes understanding. Pt denies needs or complaints at this time; will continue to monitor.

## 2024-10-02 NOTE — PROGRESS NOTES
[facility] Medicine  Progress Note    Patient Name: Hannah Montoya  MRN: [id number]  Patient Class: OP- Observation   Admission Date: 10/1/2024  Length of Stay: 0 days  Attending Physician: Sydnee Castro DO  Primary Care Provider: Emanuel Chavez MD        Subjective:     Principal Problem:Acute colitis        HPI:  Hannah Montoya is a 69 y.o. female with  stage IV NSCLC with pathologic fracture (follows w Fabrice), Non-Insulin-dependent DM2 without complications, HTN, HLD, and Depression  who presented to [facility] ED on 10/02/2024 for eval and treatment of nausea and vomiting associated with diffuse abdominal pain.  She started treatment with Opdivo on September 12th and reports feeling well up until a few days prior to presentation: started feeling malaise, nausea, and then diarrhea.  The diarrhea has worsened over the past 2 days such that she has had over 12 episodes in the last 24 hrs.  Described as watery, non-bloody, and foul-smelling.  Daughter reports she is now too weak to get out of bed.  Pt reports having started using Depends, but has soaked through her most recent pad.  There is associated general abdominal cramping.  They deny associated fever, chills, dyspnea, CP, palpitations.  Symptoms are worsened / alleviated by nothing.    ED course notable for the following:  Patient uncomfortable and appears tired.  Afebrile, HDS.  Exam: pt is pale, and has diffuse abdominal tenderness to palpation but no guarding or rebound tenderness.  Labs WBC 6.91 HGB 9.2 .  Na 134 K 4.5 BUN 27 CRT 1.1 albumin 2.3.  CT A/P w con: The visualized loops of small and large bowel show no evidence of obstruction or inflammation.   ED therapy/stabilization measures:  IV fluids and Zofran.  Heme Onc and GI made aware.  They were placed in observation under the care of [facility] [place] [facility] for further evaluation and treatment.     Overview/Hospital Course:  Hannah Sierra  Lindsay 69 y.o. female placed in observation for diarrhea.  She presented with profuse watery diarrhea in the setting of chemotherapy use.  She was without evidence of sepsis.  CT scan without acute intra-abdominal abnormalities.  She was seen by GI with plans for colonoscopy.    Interval History: reports improvement in diarrhea, occasional cramping abdominal pain. Denies any blood in stools.     Review of Systems   Constitutional:  Negative for chills and fever.   Eyes:  Negative for photophobia and visual disturbance.   Respiratory:  Negative for chest tightness and shortness of breath.    Cardiovascular:  Negative for chest pain and palpitations.   Gastrointestinal:  Positive for abdominal pain. Negative for nausea and vomiting.   Genitourinary:  Negative for dysuria and hematuria.     Objective:     Vital Signs (Most Recent):  Temp: 98.9 °F (37.2 °C) (10/02/24 0800)  Pulse: (!) 112 (10/02/24 0800)  Resp: 19 (10/02/24 0800)  BP: 110/71 (10/02/24 0800)  SpO2: 100 % (10/02/24 0800) Vital Signs (24h Range):  Temp:  [98 °F (36.7 °C)-98.9 °F (37.2 °C)] 98.9 °F (37.2 °C)  Pulse:  [] 112  Resp:  [17-26] 19  SpO2:  [93 %-100 %] 100 %  BP: (110-140)/(62-77) 110/71     Weight: 80.5 kg (177 lb 7.5 oz)  Body mass index is 32.46 kg/m².    Intake/Output Summary (Last 24 hours) at 10/2/2024 1051  Last data filed at 10/2/2024 0800  Gross per 24 hour   Intake 0 ml   Output 100 ml   Net -100 ml         Physical Exam  Vitals and nursing note reviewed.   Constitutional:       General: She is not in acute distress.     Appearance: She is well-developed. She is not diaphoretic.   HENT:      Head: Normocephalic and atraumatic.      Right Ear: External ear normal.      Left Ear: External ear normal.   Eyes:      General:         Right eye: No discharge.         Left eye: No discharge.      Conjunctiva/sclera: Conjunctivae normal.   Neck:      Thyroid: No thyromegaly.   Cardiovascular:      Rate and Rhythm: Normal rate and regular  rhythm.      Heart sounds: No murmur heard.  Pulmonary:      Effort: Pulmonary effort is normal. No respiratory distress.      Breath sounds: Normal breath sounds.   Abdominal:      General: Bowel sounds are normal. There is no distension.      Palpations: Abdomen is soft. There is no mass.      Tenderness: There is abdominal tenderness (mild diffuse abdominal tenderness).   Musculoskeletal:         General: No deformity.      Cervical back: Normal range of motion and neck supple.   Skin:     General: Skin is warm and dry.   Neurological:      Mental Status: She is alert and oriented to person, place, and time.      Sensory: No sensory deficit.   Psychiatric:         Mood and Affect: Mood normal.         Behavior: Behavior normal.             Significant Labs: CBC:   Recent Labs   Lab 10/01/24  0835 10/02/24  0637   WBC 7.14 5.25   HGB 9.1* 7.7*   HCT 31.1* 26.9*    292     CMP:   Recent Labs   Lab 10/01/24  0835 10/02/24  0638   * 134*   K 4.5 4.2   CL 99 103   CO2 25 20*   * 120*   BUN 27* 16   CREATININE 1.0 0.6   CALCIUM 9.5 8.5*   PROT 6.5 5.4*   ALBUMIN 2.2* 1.9*   BILITOT 0.3 0.2   ALKPHOS 100 77   AST 19 16   ALT 14 13   ANIONGAP 9 11     TSH:   Recent Labs   Lab 09/30/24  1014   TSH 5.169*       Significant Imaging:   Imaging Results              CT Abdomen Pelvis With IV Contrast Routine Oral Contrast (Final result)  Result time 10/01/24 20:24:42      Final result by Dwaine Clay MD (10/01/24 20:24:42)                   Impression:      1. No acute intra-abdominal abnormalities identified.  2. Persistent right lower lobe collapse and consolidation with small right pleural effusion.  3. Postsurgical changes and additional findings as detailed above.      Electronically signed by: Dwaine Clay MD  Date:    10/01/2024  Time:    20:24               Narrative:    EXAMINATION:  CT ABDOMEN PELVIS WITH IV CONTRAST    CLINICAL HISTORY:  Abdominal pain, acute,  nonlocalized;    TECHNIQUE:  Low dose axial images, sagittal and coronal reformations were obtained from the lung bases to the pubic symphysis following the IV administration of 75 mL of Omnipaque 350 .  Oral contrast was not given.    COMPARISON:  CT chest abdomen pelvis 09/18/2024.    FINDINGS:  Heart is normal in size.  Pacer wires are partially visualized.  There is persistent right lower lobe collapse/consolidation with small right pleural effusion.  Left lung base is relatively clear.    No significant hepatic abnormalities are identified.  There is no intra-or extrahepatic biliary ductal dilatation.  Gallbladder has been removed.  The stomach, pancreas, and right adrenal gland are unremarkable.  There is mild left adrenal nodular thickening.  Spleen is mildly enlarged measuring 13 cm.    Kidneys enhance normally with no evidence of hydronephrosis.  No abnormalities are seen along the ureteral courses.  Urinary bladder is nondistended.  There is mild lobular configuration of the uterus with suspected small underlying fibroids seen.    Appendix is visualized and is unremarkable.  The visualized loops of small and large bowel show no evidence of obstruction or inflammation.  No free air or free fluid.    Aorta tapers normally.    No acute osseous abnormality identified.  There is lower lumbar facet arthropathy.  Similar appearance of the left hip with associated adjacent subcutaneous soft tissue thickening and stranding.  Postoperative ORIF changes are seen involving the left proximal femur.                                        Assessment/Plan:      * Acute colitis  69F with stage IV lung cancer who on September 12th was given nivolumab 360 (Opdivo, an immune checkpoint inhibitor) now presents with several days of worsening diarrhea, and over 12 reported episodes over the last 24 hours.  Watery and non-bloody.  Associated with diffuse cramping.     D/w GI and Heme Onc: Presentation is concerning for possible  ICI colitis.   Plans for colonoscopy with GI    GI and Heme-Onc consulted; greatly appreciate recs and assistance  Stool studies: C. diff, stool culture, calprotectin, lactoferrin, fecal elastase, and qualitative fecal fat  If alternative cause of colitis not found, then anticipate starting infliximab  Labs for anticipated use of infliximab: Hep B Surface Ag, Hep B core Ab total, quantiferon gold, HIV, HCV  IV fluids          Anemia  Anemia is likely due to chronic disease due to Malignancy. Most recent hemoglobin and hematocrit are listed below.  Recent Labs     09/30/24  1014 10/01/24  0835 10/02/24  0637   HGB 9.2* 9.1* 7.7*   HCT 31.5* 31.1* 26.9*     Plan  - Monitor serial CBC: Daily  - Transfuse PRBC if patient becomes hemodynamically unstable, symptomatic or H/H drops below 7/21.  - Patient has not received any PRBC transfusions to date  - Patient's anemia is currently stable  - hemoglobin today of 7.7. baseline of 7.8 to 9.1 per chart review. Denies witnessed bleeding. Will hold transfusion    Hypomagnesemia  Patient has Abnormal Magnesium: hypomagnesemia. Will continue to monitor electrolytes closely. Will replace the affected electrolytes and repeat labs to be done after interventions completed. The patient's magnesium results have been reviewed and are listed below.  Recent Labs   Lab 10/02/24  0638   MG 0.9*        Lytic bone lesion of left femur s/p IM nail on 8/20/2024  Continue home Oxycodone 5 mg q4h PRN      Moderate episode of recurrent major depressive disorder  Continue home Wellbutrin      Class 2 severe obesity due to excess calories with serious comorbidity in adult  Body mass index is 32.46 kg/m². Morbid obesity complicates all aspects of disease management from diagnostic modalities to treatment. Weight loss encouraged and health benefits explained to patient.         NSCLC of right lung  Follows w Dr. Avina and Aide Magaña NP, who is following and providing much-appreciated  "assistance.      NICM (nonischemic cardiomyopathy)  Stable on admission.  Transitioning Toprol 100 to Lopressor 25 QID until she is more stable volume-wise.      Type 2 diabetes mellitus without complication  Patient's FSGs are controlled on current medication regimen.  Last A1c reviewed-   Lab Results   Component Value Date    HGBA1C 6.1 (H) 08/19/2024     Most recent fingerstick glucose reviewed- No results for input(s): "POCTGLUCOSE" in the last 24 hours.  Current correctional scale  Low  Maintain anti-hyperglycemic dose as follows-   Antihyperglycemics (From admission, onward)      Start     Stop Route Frequency Ordered    10/02/24 0226  insulin aspart U-100 pen 0-5 Units         -- SubQ Every 6 hours PRN 10/02/24 0127          Hold Oral hypoglycemics while patient is in the hospital.    Essential hypertension  Chronic, controlled. Latest blood pressure and vitals reviewed-     Temp:  [97.6 °F (36.4 °C)-98.6 °F (37 °C)]   Pulse:  []   Resp:  [16-26]   BP: (118-140)/(62-68)   SpO2:  [93 %-95 %] .   Home meds for hypertension were reviewed and noted below.   Hypertension Medications               amLODIPine (NORVASC) 5 MG tablet TAKE 1 TABLET BY MOUTH EVERY DAY    losartan (COZAAR) 100 MG tablet TAKE 1 TABLET BY MOUTH EVERY DAY    metoprolol succinate (TOPROL-XL) 100 MG 24 hr tablet TAKE 1 TABLET BY MOUTH EVERY DAY            While in the hospital, will manage blood pressure as follows; Adjust home antihypertensive regimen as follows- hold amlodipine and switch Toprol 100 to Lopressor 25 QID until her volume status is more stable    Will utilize p.r.n. blood pressure medication only if patient's blood pressure greater than 180/110 and she develops symptoms such as worsening chest pain or shortness of breath.      VTE Risk Mitigation (From admission, onward)           Ordered     enoxaparin injection 40 mg  Daily         10/01/24 1934     IP VTE HIGH RISK PATIENT  Once         10/01/24 1934     Place " sequential compression device  Until discontinued         10/01/24 1934                    Discharge Planning   GUERRERO:      Code Status: Full Code   Is the patient medically ready for discharge?:     Reason for patient still in hospital (select all that apply): Laboratory test, Treatment, and Consult recommendations           Javi Rodríguez PA-C  Department of Hospital Medicine   [facility]

## 2024-10-02 NOTE — PLAN OF CARE
10/02/24 1247   Discharge Planning   Assessment Type Discharge Planning Brief Assessment   Resource/Environmental Concerns none   Support Systems Children   Equipment Currently Used at Home none   Current Living Arrangements home   Patient/Family Anticipates Transition to home   Patient/Family Anticipated Services at Transition none   DME Needed Upon Discharge  none   Discharge Plan A Home  (with instructions to follow up)       [id number] [place] - [address]  [address] [place] [address]  Phone: [phone] Fax: [phone]    [facility] - [place], [place] - [address]  [address] [facility] [facility] [address]  Phone: [phone] Fax: [phone]

## 2024-10-02 NOTE — HOSPITAL COURSE
Hannah Montoya 69 y.o. female placed in observation for diarrhea.  She presented with profuse watery diarrhea in the setting of chemotherapy use.  She was without evidence of sepsis.  CT scan without acute intra-abdominal abnormalities.  She was seen by GI with plans for colonoscopy.  Underwent EGD and colonoscopy with evidence of erosions requiring biopsies.  Her magnesium and phosphorus remain low secondary to diarrhea and decreased p.o. intake which were both replaced.  Her QuantiFERON gold turned to indeterminate and was checked as a screening test plaque beginning possible infliximab should she have checkpoint induced mediated colitis.  PPD placed for confirmation. She was started on high dose steroids for colonoscopy/EGD findings. Pt was cleared to DC by GI. Steroid taper given. OP FU with IBD clinic.

## 2024-10-03 ENCOUNTER — ANESTHESIA EVENT (OUTPATIENT)
Dept: ENDOSCOPY | Facility: HOSPITAL | Age: 69
End: 2024-10-03
Payer: MEDICARE

## 2024-10-03 ENCOUNTER — ANESTHESIA (OUTPATIENT)
Dept: ENDOSCOPY | Facility: HOSPITAL | Age: 69
End: 2024-10-03
Payer: MEDICARE

## 2024-10-03 LAB
ALBUMIN SERPL BCP-MCNC: 1.9 G/DL (ref 3.5–5.2)
ALP SERPL-CCNC: 87 U/L (ref 55–135)
ALT SERPL W/O P-5'-P-CCNC: 10 U/L (ref 10–44)
ANION GAP SERPL CALC-SCNC: 10 MMOL/L (ref 8–16)
AST SERPL-CCNC: 15 U/L (ref 10–40)
BASOPHILS # BLD AUTO: 0.06 K/UL (ref 0–0.2)
BASOPHILS NFR BLD: 1 % (ref 0–1.9)
BILIRUB SERPL-MCNC: 0.2 MG/DL (ref 0.1–1)
BUN SERPL-MCNC: 11 MG/DL (ref 8–23)
C DIFF GDH STL QL: NEGATIVE
C DIFF TOX A+B STL QL IA: NEGATIVE
CALCIUM SERPL-MCNC: 8.5 MG/DL (ref 8.7–10.5)
CHLORIDE SERPL-SCNC: 101 MMOL/L (ref 95–110)
CO2 SERPL-SCNC: 20 MMOL/L (ref 23–29)
CREAT SERPL-MCNC: 0.6 MG/DL (ref 0.5–1.4)
DIFFERENTIAL METHOD BLD: ABNORMAL
E COLI SXT1 STL QL IA: NEGATIVE
E COLI SXT2 STL QL IA: NEGATIVE
EOSINOPHIL # BLD AUTO: 0 K/UL (ref 0–0.5)
EOSINOPHIL NFR BLD: 0.2 % (ref 0–8)
ERYTHROCYTE [DISTWIDTH] IN BLOOD BY AUTOMATED COUNT: 19.5 % (ref 11.5–14.5)
EST. GFR  (NO RACE VARIABLE): >60 ML/MIN/1.73 M^2
GAMMA INTERFERON BACKGROUND BLD IA-ACNC: 0.09 IU/ML
GLUCOSE SERPL-MCNC: 117 MG/DL (ref 70–110)
HCT VFR BLD AUTO: 31.1 % (ref 37–48.5)
HGB BLD-MCNC: 9.3 G/DL (ref 12–16)
IMM GRANULOCYTES # BLD AUTO: 0.23 K/UL (ref 0–0.04)
IMM GRANULOCYTES NFR BLD AUTO: 3.9 % (ref 0–0.5)
LYMPHOCYTES # BLD AUTO: 0.3 K/UL (ref 1–4.8)
LYMPHOCYTES NFR BLD: 5 % (ref 18–48)
M TB IFN-G CD4+ BCKGRND COR BLD-ACNC: -0 IU/ML
M TB IFN-G CD4+ BCKGRND COR BLD-ACNC: 0 IU/ML
MAGNESIUM SERPL-MCNC: 1 MG/DL (ref 1.6–2.6)
MAGNESIUM SERPL-MCNC: 1.1 MG/DL (ref 1.6–2.6)
MCH RBC QN AUTO: 22.7 PG (ref 27–31)
MCHC RBC AUTO-ENTMCNC: 29.9 G/DL (ref 32–36)
MCV RBC AUTO: 76 FL (ref 82–98)
MITOGEN IGNF BCKGRD COR BLD-ACNC: 0.07 IU/ML
MONOCYTES # BLD AUTO: 0.6 K/UL (ref 0.3–1)
MONOCYTES NFR BLD: 10.1 % (ref 4–15)
NEUTROPHILS # BLD AUTO: 4.7 K/UL (ref 1.8–7.7)
NEUTROPHILS NFR BLD: 79.8 % (ref 38–73)
NRBC BLD-RTO: 0 /100 WBC
PHOSPHATE SERPL-MCNC: 2.2 MG/DL (ref 2.7–4.5)
PLATELET # BLD AUTO: 352 K/UL (ref 150–450)
PMV BLD AUTO: 10.3 FL (ref 9.2–12.9)
POCT GLUCOSE: 121 MG/DL (ref 70–110)
POCT GLUCOSE: 130 MG/DL (ref 70–110)
POCT GLUCOSE: 146 MG/DL (ref 70–110)
POCT GLUCOSE: 158 MG/DL (ref 70–110)
POTASSIUM SERPL-SCNC: 3.8 MMOL/L (ref 3.5–5.1)
PROT SERPL-MCNC: 5.6 G/DL (ref 6–8.4)
RBC # BLD AUTO: 4.09 M/UL (ref 4–5.4)
RV AG STL QL IA.RAPID: NEGATIVE
SODIUM SERPL-SCNC: 131 MMOL/L (ref 136–145)
TB GOLD PLUS: ABNORMAL
WBC # BLD AUTO: 5.84 K/UL (ref 3.9–12.7)

## 2024-10-03 PROCEDURE — 84100 ASSAY OF PHOSPHORUS: CPT | Mod: HCNC

## 2024-10-03 PROCEDURE — 63600175 PHARM REV CODE 636 W HCPCS: Mod: HCNC | Performed by: PHYSICIAN ASSISTANT

## 2024-10-03 PROCEDURE — 25000003 PHARM REV CODE 250: Mod: HCNC | Performed by: STUDENT IN AN ORGANIZED HEALTH CARE EDUCATION/TRAINING PROGRAM

## 2024-10-03 PROCEDURE — 94640 AIRWAY INHALATION TREATMENT: CPT | Mod: HCNC

## 2024-10-03 PROCEDURE — 43239 EGD BIOPSY SINGLE/MULTIPLE: CPT | Mod: 51,HCNC,, | Performed by: STUDENT IN AN ORGANIZED HEALTH CARE EDUCATION/TRAINING PROGRAM

## 2024-10-03 PROCEDURE — 88342 IMHCHEM/IMCYTCHM 1ST ANTB: CPT | Mod: 26,HCNC,, | Performed by: PATHOLOGY

## 2024-10-03 PROCEDURE — 80053 COMPREHEN METABOLIC PANEL: CPT | Mod: HCNC

## 2024-10-03 PROCEDURE — 63600175 PHARM REV CODE 636 W HCPCS: Mod: HCNC | Performed by: STUDENT IN AN ORGANIZED HEALTH CARE EDUCATION/TRAINING PROGRAM

## 2024-10-03 PROCEDURE — 83735 ASSAY OF MAGNESIUM: CPT | Mod: 91,HCNC

## 2024-10-03 PROCEDURE — 88305 TISSUE EXAM BY PATHOLOGIST: CPT | Mod: 26,HCNC,, | Performed by: PATHOLOGY

## 2024-10-03 PROCEDURE — 94761 N-INVAS EAR/PLS OXIMETRY MLT: CPT | Mod: HCNC

## 2024-10-03 PROCEDURE — 99900035 HC TECH TIME PER 15 MIN (STAT): Mod: HCNC

## 2024-10-03 PROCEDURE — 30200315 PPD INTRADERMAL TEST REV CODE 302: Mod: HCNC | Performed by: PHYSICIAN ASSISTANT

## 2024-10-03 PROCEDURE — 11000001 HC ACUTE MED/SURG PRIVATE ROOM: Mod: HCNC

## 2024-10-03 PROCEDURE — 86580 TB INTRADERMAL TEST: CPT | Mod: HCNC | Performed by: PHYSICIAN ASSISTANT

## 2024-10-03 PROCEDURE — 25000003 PHARM REV CODE 250: Mod: HCNC | Performed by: NURSE PRACTITIONER

## 2024-10-03 PROCEDURE — 25000242 PHARM REV CODE 250 ALT 637 W/ HCPCS: Mod: HCNC | Performed by: STUDENT IN AN ORGANIZED HEALTH CARE EDUCATION/TRAINING PROGRAM

## 2024-10-03 PROCEDURE — 0DB58ZX EXCISION OF ESOPHAGUS, VIA NATURAL OR ARTIFICIAL OPENING ENDOSCOPIC, DIAGNOSTIC: ICD-10-PCS | Performed by: STUDENT IN AN ORGANIZED HEALTH CARE EDUCATION/TRAINING PROGRAM

## 2024-10-03 PROCEDURE — 37000009 HC ANESTHESIA EA ADD 15 MINS: Mod: HCNC | Performed by: STUDENT IN AN ORGANIZED HEALTH CARE EDUCATION/TRAINING PROGRAM

## 2024-10-03 PROCEDURE — 27201012 HC FORCEPS, HOT/COLD, DISP: Mod: HCNC | Performed by: STUDENT IN AN ORGANIZED HEALTH CARE EDUCATION/TRAINING PROGRAM

## 2024-10-03 PROCEDURE — 0DBK8ZX EXCISION OF ASCENDING COLON, VIA NATURAL OR ARTIFICIAL OPENING ENDOSCOPIC, DIAGNOSTIC: ICD-10-PCS | Performed by: STUDENT IN AN ORGANIZED HEALTH CARE EDUCATION/TRAINING PROGRAM

## 2024-10-03 PROCEDURE — 0DB98ZX EXCISION OF DUODENUM, VIA NATURAL OR ARTIFICIAL OPENING ENDOSCOPIC, DIAGNOSTIC: ICD-10-PCS | Performed by: STUDENT IN AN ORGANIZED HEALTH CARE EDUCATION/TRAINING PROGRAM

## 2024-10-03 PROCEDURE — 99900031 HC PATIENT EDUCATION (STAT): Mod: HCNC

## 2024-10-03 PROCEDURE — 85025 COMPLETE CBC W/AUTO DIFF WBC: CPT | Mod: HCNC

## 2024-10-03 PROCEDURE — 0DBH8ZX EXCISION OF CECUM, VIA NATURAL OR ARTIFICIAL OPENING ENDOSCOPIC, DIAGNOSTIC: ICD-10-PCS | Performed by: STUDENT IN AN ORGANIZED HEALTH CARE EDUCATION/TRAINING PROGRAM

## 2024-10-03 PROCEDURE — 36415 COLL VENOUS BLD VENIPUNCTURE: CPT | Mod: HCNC | Performed by: INTERNAL MEDICINE

## 2024-10-03 PROCEDURE — 88305 TISSUE EXAM BY PATHOLOGIST: CPT | Mod: 59,HCNC | Performed by: PATHOLOGY

## 2024-10-03 PROCEDURE — 37000008 HC ANESTHESIA 1ST 15 MINUTES: Mod: HCNC | Performed by: STUDENT IN AN ORGANIZED HEALTH CARE EDUCATION/TRAINING PROGRAM

## 2024-10-03 PROCEDURE — 25000003 PHARM REV CODE 250: Mod: HCNC

## 2024-10-03 PROCEDURE — 45380 COLONOSCOPY AND BIOPSY: CPT | Mod: HCNC | Performed by: STUDENT IN AN ORGANIZED HEALTH CARE EDUCATION/TRAINING PROGRAM

## 2024-10-03 PROCEDURE — 88342 IMHCHEM/IMCYTCHM 1ST ANTB: CPT | Mod: 59,HCNC | Performed by: PATHOLOGY

## 2024-10-03 PROCEDURE — 83735 ASSAY OF MAGNESIUM: CPT | Mod: HCNC | Performed by: INTERNAL MEDICINE

## 2024-10-03 PROCEDURE — 43239 EGD BIOPSY SINGLE/MULTIPLE: CPT | Mod: HCNC | Performed by: STUDENT IN AN ORGANIZED HEALTH CARE EDUCATION/TRAINING PROGRAM

## 2024-10-03 PROCEDURE — 0DBP8ZX EXCISION OF RECTUM, VIA NATURAL OR ARTIFICIAL OPENING ENDOSCOPIC, DIAGNOSTIC: ICD-10-PCS | Performed by: STUDENT IN AN ORGANIZED HEALTH CARE EDUCATION/TRAINING PROGRAM

## 2024-10-03 PROCEDURE — 63600175 PHARM REV CODE 636 W HCPCS: Mod: HCNC

## 2024-10-03 PROCEDURE — 99233 SBSQ HOSP IP/OBS HIGH 50: CPT | Mod: HCNC,,, | Performed by: INTERNAL MEDICINE

## 2024-10-03 PROCEDURE — 45380 COLONOSCOPY AND BIOPSY: CPT | Mod: HCNC,,, | Performed by: STUDENT IN AN ORGANIZED HEALTH CARE EDUCATION/TRAINING PROGRAM

## 2024-10-03 RX ORDER — PROPOFOL 10 MG/ML
VIAL (ML) INTRAVENOUS
Status: DISPENSED
Start: 2024-10-03 | End: 2024-10-04

## 2024-10-03 RX ORDER — LIDOCAINE HYDROCHLORIDE 20 MG/ML
INJECTION INTRAVENOUS
Status: DISCONTINUED | OUTPATIENT
Start: 2024-10-03 | End: 2024-10-03

## 2024-10-03 RX ORDER — PROPOFOL 10 MG/ML
VIAL (ML) INTRAVENOUS
Status: DISCONTINUED | OUTPATIENT
Start: 2024-10-03 | End: 2024-10-03

## 2024-10-03 RX ORDER — MAGNESIUM SULFATE HEPTAHYDRATE 40 MG/ML
2 INJECTION, SOLUTION INTRAVENOUS
Status: DISPENSED | OUTPATIENT
Start: 2024-10-03 | End: 2024-10-03

## 2024-10-03 RX ORDER — LIDOCAINE HYDROCHLORIDE 20 MG/ML
INJECTION, SOLUTION EPIDURAL; INFILTRATION; INTRACAUDAL; PERINEURAL
Status: DISPENSED
Start: 2024-10-03 | End: 2024-10-04

## 2024-10-03 RX ORDER — MAGNESIUM SULFATE HEPTAHYDRATE 40 MG/ML
4 INJECTION, SOLUTION INTRAVENOUS ONCE
Status: DISCONTINUED | OUTPATIENT
Start: 2024-10-03 | End: 2024-10-03

## 2024-10-03 RX ADMIN — PROPOFOL 30 MG: 10 INJECTION, EMULSION INTRAVENOUS at 01:10

## 2024-10-03 RX ADMIN — PROPOFOL 20 MG: 10 INJECTION, EMULSION INTRAVENOUS at 01:10

## 2024-10-03 RX ADMIN — SODIUM CHLORIDE, POTASSIUM CHLORIDE, SODIUM LACTATE AND CALCIUM CHLORIDE: 600; 310; 30; 20 INJECTION, SOLUTION INTRAVENOUS at 04:10

## 2024-10-03 RX ADMIN — PROPOFOL 30 MG: 10 INJECTION, EMULSION INTRAVENOUS at 02:10

## 2024-10-03 RX ADMIN — PROPOFOL 50 MG: 10 INJECTION, EMULSION INTRAVENOUS at 01:10

## 2024-10-03 RX ADMIN — IPRATROPIUM BROMIDE 0.5 MG: 0.5 SOLUTION RESPIRATORY (INHALATION) at 07:10

## 2024-10-03 RX ADMIN — MICONAZOLE NITRATE: 20 POWDER TOPICAL at 09:10

## 2024-10-03 RX ADMIN — BUDESONIDE 0.5 MG: 0.5 INHALANT RESPIRATORY (INHALATION) at 07:10

## 2024-10-03 RX ADMIN — PROPOFOL 10 MG: 10 INJECTION, EMULSION INTRAVENOUS at 01:10

## 2024-10-03 RX ADMIN — METHOCARBAMOL TABLETS 750 MG: 750 TABLET, COATED ORAL at 09:10

## 2024-10-03 RX ADMIN — GABAPENTIN 300 MG: 300 CAPSULE ORAL at 09:10

## 2024-10-03 RX ADMIN — METHYLPREDNISOLONE SODIUM SUCCINATE 80 MG: 40 INJECTION, POWDER, FOR SOLUTION INTRAMUSCULAR; INTRAVENOUS at 04:10

## 2024-10-03 RX ADMIN — IPRATROPIUM BROMIDE 0.5 MG: 0.5 SOLUTION RESPIRATORY (INHALATION) at 01:10

## 2024-10-03 RX ADMIN — SODIUM CHLORIDE: 0.9 INJECTION, SOLUTION INTRAVENOUS at 01:10

## 2024-10-03 RX ADMIN — METOPROLOL TARTRATE 25 MG: 25 TABLET, FILM COATED ORAL at 09:10

## 2024-10-03 RX ADMIN — METHOCARBAMOL TABLETS 750 MG: 750 TABLET, COATED ORAL at 04:10

## 2024-10-03 RX ADMIN — PROPOFOL 60 MG: 10 INJECTION, EMULSION INTRAVENOUS at 01:10

## 2024-10-03 RX ADMIN — ARFORMOTEROL TARTRATE 15 MCG: 15 SOLUTION RESPIRATORY (INHALATION) at 07:10

## 2024-10-03 RX ADMIN — PROPOFOL 40 MG: 10 INJECTION, EMULSION INTRAVENOUS at 01:10

## 2024-10-03 RX ADMIN — PROPOFOL 20 MG: 10 INJECTION, EMULSION INTRAVENOUS at 02:10

## 2024-10-03 RX ADMIN — SODIUM CHLORIDE, POTASSIUM CHLORIDE, SODIUM LACTATE AND CALCIUM CHLORIDE: 600; 310; 30; 20 INJECTION, SOLUTION INTRAVENOUS at 02:10

## 2024-10-03 RX ADMIN — TUBERCULIN PURIFIED PROTEIN DERIVATIVE 5 UNITS: 5 INJECTION, SOLUTION INTRADERMAL at 04:10

## 2024-10-03 RX ADMIN — SERTRALINE HYDROCHLORIDE 100 MG: 50 TABLET ORAL at 09:10

## 2024-10-03 RX ADMIN — POLYETHYLENE GLYCOL 3350, SODIUM SULFATE ANHYDROUS, SODIUM BICARBONATE, SODIUM CHLORIDE, POTASSIUM CHLORIDE 2000 ML: 236; 22.74; 6.74; 5.86; 2.97 POWDER, FOR SOLUTION ORAL at 02:10

## 2024-10-03 RX ADMIN — SODIUM PHOSPHATE, MONOBASIC, MONOHYDRATE AND SODIUM PHOSPHATE, DIBASIC, ANHYDROUS 15 MMOL: 142; 276 INJECTION, SOLUTION INTRAVENOUS at 07:10

## 2024-10-03 RX ADMIN — CETIRIZINE HYDROCHLORIDE 10 MG: 10 TABLET, FILM COATED ORAL at 09:10

## 2024-10-03 RX ADMIN — LIDOCAINE HYDROCHLORIDE 140 MG: 20 INJECTION, SOLUTION INTRAVENOUS at 01:10

## 2024-10-03 RX ADMIN — ENOXAPARIN SODIUM 40 MG: 40 INJECTION SUBCUTANEOUS at 04:10

## 2024-10-03 NOTE — PROGRESS NOTES
[facility] Medicine  Progress Note    Patient Name: Hannah Montoya  MRN: [id number]  Patient Class: IP- Inpatient   Admission Date: 10/1/2024  Length of Stay: 1 days  Attending Physician: Sydnee Castro DO  Primary Care Provider: Emanuel Chavez MD        Subjective:     Principal Problem:Acute colitis        HPI:  Hannah Montoya is a 69 y.o. female with  stage IV NSCLC with pathologic fracture (follows w Fabrice), Non-Insulin-dependent DM2 without complications, HTN, HLD, and Depression  who presented to [facility] ED on 10/02/2024 for eval and treatment of nausea and vomiting associated with diffuse abdominal pain.  She started treatment with Opdivo on September 12th and reports feeling well up until a few days prior to presentation: started feeling malaise, nausea, and then diarrhea.  The diarrhea has worsened over the past 2 days such that she has had over 12 episodes in the last 24 hrs.  Described as watery, non-bloody, and foul-smelling.  Daughter reports she is now too weak to get out of bed.  Pt reports having started using Depends, but has soaked through her most recent pad.  There is associated general abdominal cramping.  They deny associated fever, chills, dyspnea, CP, palpitations.  Symptoms are worsened / alleviated by nothing.    ED course notable for the following:  Patient uncomfortable and appears tired.  Afebrile, HDS.  Exam: pt is pale, and has diffuse abdominal tenderness to palpation but no guarding or rebound tenderness.  Labs WBC 6.91 HGB 9.2 .  Na 134 K 4.5 BUN 27 CRT 1.1 albumin 2.3.  CT A/P w con: The visualized loops of small and large bowel show no evidence of obstruction or inflammation.   ED therapy/stabilization measures:  IV fluids and Zofran.  Heme Onc and GI made aware.  They were placed in observation under the care of [facility] for further evaluation and treatment.     Overview/Hospital Course:  Hannah Sierra  Lindsay 69 y.o. female placed in observation for diarrhea.  She presented with profuse watery diarrhea in the setting of chemotherapy use.  She was without evidence of sepsis.  CT scan without acute intra-abdominal abnormalities.  She was seen by GI with plans for colonoscopy.  Underwent EGD and colonoscopy with evidence of erosions requiring biopsies.  Her magnesium and phosphorus remain low secondary to diarrhea and decreased p.o. intake which were both replaced.  Her QuantiFERON gold turned to indeterminate and was checked as a screening test plaque beginning possible infliximab should she have checkpoint induced mediated colitis.  PPD placed for confirmation.    Interval History: reports no diarrhea overnight though return after starting bowel prep. Occasional intermittent abdominal cramping.     Review of Systems   Constitutional:  Negative for chills and fever.   Eyes:  Negative for photophobia and visual disturbance.   Respiratory:  Negative for chest tightness and shortness of breath.    Cardiovascular:  Negative for chest pain and palpitations.   Gastrointestinal:  Positive for abdominal pain and diarrhea. Negative for nausea and vomiting.   Genitourinary:  Negative for dysuria and hematuria.     Objective:     Vital Signs (Most Recent):  Temp: 98.2 °F (36.8 °C) (10/03/24 1317)  Pulse: 100 (10/03/24 1317)  Resp: 20 (10/03/24 1317)  BP: 135/63 (10/03/24 1317)  SpO2: (!) 94 % (10/03/24 1317) Vital Signs (24h Range):  Temp:  [97.4 °F (36.3 °C)-98.5 °F (36.9 °C)] 98.2 °F (36.8 °C)  Pulse:  [] 100  Resp:  [16-20] 20  SpO2:  [91 %-97 %] 94 %  BP: (111-138)/(60-80) 135/63     Weight: 80.5 kg (177 lb 7.5 oz)  Body mass index is 32.46 kg/m².    Intake/Output Summary (Last 24 hours) at 10/3/2024 1402  Last data filed at 10/3/2024 1355  Gross per 24 hour   Intake 250 ml   Output 400 ml   Net -150 ml         Physical Exam  Vitals and nursing note reviewed.   Constitutional:       General: She is not in acute  distress.     Appearance: She is well-developed. She is not diaphoretic.   HENT:      Head: Normocephalic and atraumatic.      Right Ear: External ear normal.      Left Ear: External ear normal.   Eyes:      General:         Right eye: No discharge.         Left eye: No discharge.      Conjunctiva/sclera: Conjunctivae normal.   Neck:      Thyroid: No thyromegaly.   Cardiovascular:      Rate and Rhythm: Normal rate and regular rhythm.      Heart sounds: No murmur heard.  Pulmonary:      Effort: Pulmonary effort is normal. No respiratory distress.      Breath sounds: Normal breath sounds.   Abdominal:      General: Bowel sounds are normal. There is no distension.      Palpations: Abdomen is soft. There is no mass.      Tenderness: There is abdominal tenderness (mild diffuse tenderness).   Musculoskeletal:         General: No deformity.      Cervical back: Normal range of motion and neck supple.   Skin:     General: Skin is warm and dry.   Neurological:      Mental Status: She is alert and oriented to person, place, and time.      Sensory: No sensory deficit.   Psychiatric:         Mood and Affect: Mood normal.         Behavior: Behavior normal.             Significant Labs: CBC:   Recent Labs   Lab 10/02/24  0637 10/03/24  0517   WBC 5.25 5.84   HGB 7.7* 9.3*   HCT 26.9* 31.1*    352     CMP:   Recent Labs   Lab 10/02/24  0638 10/03/24  0516   * 131*   K 4.2 3.8    101   CO2 20* 20*   * 117*   BUN 16 11   CREATININE 0.6 0.6   CALCIUM 8.5* 8.5*   PROT 5.4* 5.6*   ALBUMIN 1.9* 1.9*   BILITOT 0.2 0.2   ALKPHOS 77 87   AST 16 15   ALT 13 10   ANIONGAP 11 10       Significant Imaging:   Imaging Results              CT Abdomen Pelvis With IV Contrast Routine Oral Contrast (Final result)  Result time 10/01/24 20:24:42      Final result by Dwaine Clay MD (10/01/24 20:24:42)                   Impression:      1. No acute intra-abdominal abnormalities identified.  2. Persistent right lower lobe  collapse and consolidation with small right pleural effusion.  3. Postsurgical changes and additional findings as detailed above.      Electronically signed by: Dwaine Clay MD  Date:    10/01/2024  Time:    20:24               Narrative:    EXAMINATION:  CT ABDOMEN PELVIS WITH IV CONTRAST    CLINICAL HISTORY:  Abdominal pain, acute, nonlocalized;    TECHNIQUE:  Low dose axial images, sagittal and coronal reformations were obtained from the lung bases to the pubic symphysis following the IV administration of 75 mL of Omnipaque 350 .  Oral contrast was not given.    COMPARISON:  CT chest abdomen pelvis 09/18/2024.    FINDINGS:  Heart is normal in size.  Pacer wires are partially visualized.  There is persistent right lower lobe collapse/consolidation with small right pleural effusion.  Left lung base is relatively clear.    No significant hepatic abnormalities are identified.  There is no intra-or extrahepatic biliary ductal dilatation.  Gallbladder has been removed.  The stomach, pancreas, and right adrenal gland are unremarkable.  There is mild left adrenal nodular thickening.  Spleen is mildly enlarged measuring 13 cm.    Kidneys enhance normally with no evidence of hydronephrosis.  No abnormalities are seen along the ureteral courses.  Urinary bladder is nondistended.  There is mild lobular configuration of the uterus with suspected small underlying fibroids seen.    Appendix is visualized and is unremarkable.  The visualized loops of small and large bowel show no evidence of obstruction or inflammation.  No free air or free fluid.    Aorta tapers normally.    No acute osseous abnormality identified.  There is lower lumbar facet arthropathy.  Similar appearance of the left hip with associated adjacent subcutaneous soft tissue thickening and stranding.  Postoperative ORIF changes are seen involving the left proximal femur.                                          Assessment/Plan:      * Acute colitis  69F with  stage IV lung cancer who on September 12th was given nivolumab 360 (Opdivo, an immune checkpoint inhibitor) now presents with several days of worsening diarrhea, and over 12 reported episodes over the last 24 hours.  Watery and non-bloody.  Associated with diffuse cramping.     D/w GI and Heme Onc: Presentation is concerning for possible ICI colitis.   Plans for colonoscopy with GI    GI and Heme-Onc consulted; greatly appreciate recs and assistance  Stool studies: c diff negative and without other infectious cause  If alternative cause of colitis not found, then anticipate starting infliximab  Labs for anticipated use of infliximab: Hep B Surface Ag, Hep B core Ab total, quantiferon gold, HIV, HCV  IV fluids  Colonscopy/EGD with evidence of erosions. GI discussing with IBD to determine initiation of steroids  Her screening quantiferon gold returned indeterminate. Discussed with ID will place PPD-tuberculin for further assessment and confirmation. Does not currently require precautions    ADDENDUM: discussed with GI recommending 1mg/kg solumedrol daily.       Anemia  Anemia is likely due to chronic disease due to Malignancy. Most recent hemoglobin and hematocrit are listed below.  Recent Labs     09/30/24  1014 10/01/24  0835 10/02/24  0637   HGB 9.2* 9.1* 7.7*   HCT 31.5* 31.1* 26.9*     Plan  - Monitor serial CBC: Daily  - Transfuse PRBC if patient becomes hemodynamically unstable, symptomatic or H/H drops below 7/21.  - Patient has not received any PRBC transfusions to date  - Patient's anemia is currently stable  - hemoglobin today of 7.7. baseline of 7.8 to 9.1 per chart review. Denies witnessed bleeding. Will hold transfusion    Hypomagnesemia  Patient has Abnormal Magnesium: hypomagnesemia. Will continue to monitor electrolytes closely. Will replace the affected electrolytes and repeat labs to be done after interventions completed. The patient's magnesium results have been reviewed and are listed  "below.  Recent Labs   Lab 10/03/24  0516   MG 1.0*  1.1*      Continues to remain low, continue aggressive IV supplementation and IV phos as Phos is low    Lytic bone lesion of left femur s/p IM nail on 8/20/2024  Continue home Oxycodone 5 mg q4h PRN      Moderate episode of recurrent major depressive disorder  Continue home Wellbutrin      Class 2 severe obesity due to excess calories with serious comorbidity in adult  Body mass index is 32.46 kg/m². Morbid obesity complicates all aspects of disease management from diagnostic modalities to treatment. Weight loss encouraged and health benefits explained to patient.         NSCLC of right lung  Follows w Dr. Avina and Aide Magaña, [facility], who is following and providing much-appreciated assistance.      NICM (nonischemic cardiomyopathy)  Stable on admission.  Transitioning Toprol 100 to Lopressor 25 QID until she is more stable volume-wise.      Type 2 diabetes mellitus without complication  Patient's FSGs are controlled on current medication regimen.  Last A1c reviewed-   Lab Results   Component Value Date    HGBA1C 6.1 (H) 08/19/2024     Most recent fingerstick glucose reviewed- No results for input(s): "POCTGLUCOSE" in the last 24 hours.  Current correctional scale  Low  Maintain anti-hyperglycemic dose as follows-   Antihyperglycemics (From admission, onward)      Start     Stop Route Frequency Ordered    10/02/24 0226  insulin aspart U-100 pen 0-5 Units         -- SubQ Every 6 hours PRN 10/02/24 0127          Hold Oral hypoglycemics while patient is in the hospital.    Essential hypertension  Chronic, controlled. Latest blood pressure and vitals reviewed-     Temp:  [97.6 °F (36.4 °C)-98.6 °F (37 °C)]   Pulse:  []   Resp:  [16-26]   BP: (118-140)/(62-68)   SpO2:  [93 %-95 %] .   Home meds for hypertension were reviewed and noted below.   Hypertension Medications               amLODIPine (NORVASC) 5 MG tablet TAKE 1 TABLET BY MOUTH EVERY DAY    losartan " (COZAAR) 100 MG tablet TAKE 1 TABLET BY MOUTH EVERY DAY    metoprolol succinate (TOPROL-XL) 100 MG 24 hr tablet TAKE 1 TABLET BY MOUTH EVERY DAY            While in the hospital, will manage blood pressure as follows; Adjust home antihypertensive regimen as follows- hold amlodipine and switch Toprol 100 to Lopressor 25 QID until her volume status is more stable    Will utilize p.r.n. blood pressure medication only if patient's blood pressure greater than 180/110 and she develops symptoms such as worsening chest pain or shortness of breath.      VTE Risk Mitigation (From admission, onward)           Ordered     enoxaparin injection 40 mg  Daily         10/01/24 1934     IP VTE HIGH RISK PATIENT  Once         10/01/24 1934     Place sequential compression device  Until discontinued         10/01/24 1934                    Discharge Planning   GUERRERO:      Code Status: Full Code   Is the patient medically ready for discharge?:     Reason for patient still in hospital (select all that apply): Laboratory test, Treatment, and Consult recommendations  Discharge Plan A: Home (with instructions to follow up)            Javi Rodríguez PA-C  Department of Hospital Medicine   [facility]

## 2024-10-03 NOTE — TRANSFER OF CARE
"Anesthesia Transfer of Care Note    Patient: Hannah Montoya    Procedure(s) Performed: Procedure(s) (LRB):  EGD (ESOPHAGOGASTRODUODENOSCOPY) (N/A)  COLONOSCOPY (N/A)    Patient location: GI    Anesthesia Type: general    Transport from OR: Transported from OR on room air with adequate spontaneous ventilation    Post pain: adequate analgesia    Post assessment: no apparent anesthetic complications and tolerated procedure well    Post vital signs: stable    Level of consciousness: awake and alert    Nausea/Vomiting: no nausea/vomiting    Complications: none    Transfer of care protocol was followed      Last vitals: Visit Vitals  /60 (BP Location: Left arm, Patient Position: Lying)   Pulse 98   Temp 36.6 °C (97.8 °F) (Oral)   Resp 19   Ht 5' 2" (1.575 m)   Wt 80.5 kg (177 lb 7.5 oz)   LMP  (LMP Unknown)   SpO2 95%   Breastfeeding No   BMI 32.46 kg/m²     "

## 2024-10-03 NOTE — ANESTHESIA POSTPROCEDURE EVALUATION
Anesthesia Post Evaluation    Patient: Hannah Montoya    Procedure(s) Performed: Procedure(s) (LRB):  EGD (ESOPHAGOGASTRODUODENOSCOPY) (N/A)  COLONOSCOPY (N/A)    Final Anesthesia Type: general      Patient location during evaluation: GI PACU  Patient participation: Yes- Able to Participate  Level of consciousness: awake and alert and oriented  Post-procedure vital signs: reviewed and stable  Pain management: adequate  Airway patency: patent    PONV status at discharge: No PONV  Anesthetic complications: no      Cardiovascular status: blood pressure returned to baseline and hemodynamically stable  Respiratory status: unassisted, spontaneous ventilation and room air  Hydration status: euvolemic  Follow-up not needed.              Vitals Value Taken Time   /55 10/03/24 1450   Temp 36.6 °C (97.8 °F) 10/03/24 1421   Pulse 99 10/03/24 1450   Resp 21 10/03/24 1450   SpO2 96 % 10/03/24 1450         Event Time   Out of Recovery 10/03/2024 14:54:44         Pain/James Score: James Score: 10 (10/3/2024  2:50 PM)

## 2024-10-03 NOTE — ANESTHESIA PREPROCEDURE EVALUATION
10/03/2024    Pre-operative evaluation for Procedure(s) (LRB):  EGD (ESOPHAGOGASTRODUODENOSCOPY) (N/A)  COLONOSCOPY (N/A)    Hannah Montoya is a 69 y.o. female     Patient Active Problem List   Diagnosis    Essential hypertension    Type 2 diabetes mellitus without complication    Seasonal allergies    Left bundle-branch block    Dilated cardiomyopathy    NICM (nonischemic cardiomyopathy)    Cardiac resynchronization therapy defibrillator (CRT-D) in place    Lung mass    Refractive error    Nuclear sclerosis of both eyes    NSCLC of right lung    Class 2 severe obesity due to excess calories with serious comorbidity in adult    Abnormal thyroid function test    Moderate episode of recurrent major depressive disorder    Lytic bone lesion of left femur s/p IM nail on 8/20/2024    Hypomagnesemia    Pathological fracture of intertrochanteric section of femur    Anemia    Malignant neoplasm metastatic to bone    Left hip pain    Acute colitis    Diarrhea    Antineoplastic chemotherapy induced anemia       Review of patient's allergies indicates:   Allergen Reactions    Taxol [paclitaxel]      Hypersensitivity reaction to taxol, symptoms included shortness of breath, nausea, dizziness, flushing     Carboplatin Other (See Comments)     Itching and hives    Adhesive Rash     tegaderm burns and blistered skin       Current Facility-Administered Medications on File Prior to Encounter   Medication Dose Route Frequency Provider Last Rate Last Admin    fentaNYL injection 25 mcg  25 mcg Intravenous Q5 Min PRN Keesha Martins MD        haloperidol lactate injection 0.5 mg  0.5 mg Intravenous Once PRN Keesha Martins MD        HYDROmorphone injection 0.2 mg  0.2 mg Intravenous Q5 Min PRN Keesha Martins MD        ondansetron injection 4 mg  4 mg Intravenous Once PRN Keesha Martins MD        sodium chloride 0.9% flush 10 mL  10 mL  Intravenous Keesha Alejandro MD         Current Outpatient Medications on File Prior to Encounter   Medication Sig Dispense Refill    ACCU-CHEK ALFRED PLUS TEST STRP Strp USE TO TEST THREE TIMES DAILY 200 strip 3    acetaminophen (TYLENOL) 500 MG tablet Take 2 tablets (1,000 mg total) by mouth every 8 (eight) hours as needed for Pain.      albuterol (ACCUNEB) 1.25 mg/3 mL Nebu use 1 AMPULE (3ml) via NEBULIZER EVERY 6 HOURS AS NEEDED 300 mL 3    albuterol (VENTOLIN HFA) 90 mcg/actuation inhaler Inhale 2 puffs into the lungs every 4 (four) hours as needed for Wheezing or Shortness of Breath. Rescue 18 g 4    amLODIPine (NORVASC) 5 MG tablet TAKE 1 TABLET BY MOUTH EVERY DAY 90 tablet 2    atorvastatin (LIPITOR) 10 MG tablet TAKE 1 TABLET BY MOUTH EVERY DAY 90 tablet 1    benzonatate (TESSALON) 200 MG capsule Take 1 capsule (200 mg total) by mouth 3 (three) times daily as needed for Cough. 30 capsule 1    buPROPion (WELLBUTRIN XL) 150 MG TB24 tablet TAKE 1 TABLET BY MOUTH EVERY DAY 90 tablet 3    cetirizine (ZYRTEC) 10 MG tablet Take 10 mg by mouth every evening.       cholecalciferol, vitamin D3, (VITAMIN D3) 50 mcg (2,000 unit) Tab Take 1 tablet (2,000 Units total) by mouth once daily. 30 tablet 11    gabapentin (NEURONTIN) 300 MG capsule Take 1 capsule (300 mg total) by mouth 3 (three) times daily. 90 capsule 11    losartan (COZAAR) 100 MG tablet TAKE 1 TABLET BY MOUTH EVERY DAY 90 tablet 3    meloxicam (MOBIC) 7.5 MG tablet Take 1 tablet (7.5 mg total) by mouth once daily. 30 tablet 1    metFORMIN (GLUCOPHAGE) 500 MG tablet TAKE 2 TABLETS BY MOUTH TWICE A DAY WITH MEALS 360 tablet 3    methocarbamoL (ROBAXIN) 750 MG Tab Take 1 tablet (750 mg total) by mouth 4 (four) times daily. for 10 days 40 tablet 0    metoprolol succinate (TOPROL-XL) 100 MG 24 hr tablet TAKE 1 TABLET BY MOUTH EVERY DAY 90 tablet 3    multivitamin (THERAGRAN) per tablet Take 1 tablet by mouth once daily.      nystatin (MYCOSTATIN) powder Apply  topically 2 (two) times daily. 60 g 1    ondansetron (ZOFRAN-ODT) 8 MG TbDL Take 1 tablet (8 mg total) by mouth every 8 (eight) hours as needed (nausea/vomiting). Take 1 tablet (8 mg) by mouth every 8 hours as needed for nausea/vomiting. 60 tablet 5    oxyCODONE (ROXICODONE) 5 MG immediate release tablet Take 1 tablet (5 mg total) by mouth every 6 (six) hours as needed for Pain. 28 tablet 0    palonosetron (ALOXI) 0.25 mg/5 mL injection       pantoprazole (PROTONIX) 40 MG tablet TAKE 1 TABLET BY MOUTH EVERY DAY 90 tablet 3    READI-CAT 2 2 % (w/v) suspension       READI-CAT 2 2.1 % (w/v), 2.0 % (w/w) suspension       sertraline (ZOLOFT) 100 MG tablet TAKE 1 TABLET BY MOUTH EVERY DAY IN THE EVENING 90 tablet 2    tiotropium (SPIRIVA WITH HANDIHALER) 18 mcg inhalation capsule INHALE THE CONTENTS OF 1 CAPSULE BY MOUTH EVERY DAY 90 capsule 3    WIXELA INHUB 250-50 mcg/dose diskus inhaler INHALE 1 PUFF INTO THE LUNGS 2 (TWO) TIMES DAILY. CONTROLLER 180 each 3       Past Surgical History:   Procedure Laterality Date    BIOPSY, WITH CT GUIDANCE Right 2023    Procedure: LUNG MASS BIOPSY, WITH CT GUIDANCE;  Surgeon: Yehuda Green MD;  Location: [facility] CATH LAB;  Service: Radiology;  Laterality: Right;    BREAST BIOPSY Right 2016    IDC    BREAST LUMPECTOMY Right     CARDIAC CATHETERIZATION Bilateral 2017    CARDIAC DEFIBRILLATOR PLACEMENT Left 08/10/2017    CARDIAC DEFIBRILLATOR PLACEMENT Left 2018     SECTION      x2    CHOLECYSTECTOMY      FEMUR BIOPSY Left 2024    Procedure: BIOPSY, FEMUR;  Surgeon: Porter Campos MD;  Location: [facility] OR [address] [place] [address];  Service: Orthopedics;  Laterality: Left;    INSERTION OF TUNNELED CENTRAL VENOUS CATHETER (CVC) WITH SUBCUTANEOUS PORT Right 2020    Procedure: RQDYRUOLY-CWWG-D-CATH, RIGHT;  Surgeon: Josefa Caceres MD;  Location: [facility] OR [address] [place] [address];  Service: General;  Laterality: Right;  Port-a-cath placed to R. IJ    INTRAMEDULLARY  RODDING OF FEMUR Left 2024    Procedure: INSERTION, INTRAMEDULLARY ANTOINE, FEMUR;  Surgeon: Porter Campos MD;  [facility];  Service: Orthopedics;  Laterality: Left;    LUNG BIOPSY N/A 2020    Procedure: BIOPSY, LUNG;  Surgeon: Kalpesh Diagnostic Provider;  Location: [facility] OR;  Service: Radiology;  Laterality: N/A;  8AM START  RN PREOP 2020---COVID NEGATIVE    NAVIGATIONAL BRONCHOSCOPY N/A 10/13/2020    Procedure: BRONCHOSCOPY, NAVIGATIONAL;  Surgeon: Jamilah Weaver MD;  [facility];  Service: Pulmonary;  Laterality: N/A;    REVISION OF IMPLANTABLE CARDIOVERTER-DEFIBRILLATOR (ICD) ELECTRODE LEAD PLACEMENT N/A 6/15/2018    Procedure: REVISION-LEAD-ICD;  Surgeon: Javy Gates MD;  Location: [facility] CATH LAB;  Service: Cardiology;  Laterality: N/A;  LBBB, Bi-V ICD HIS Elmo rev, MDT, Choice, MB, 3 Prep    ROBOT-ASSISTED LAPAROSCOPIC LYMPHADENECTOMY USING DA SALVADOR XI Right 10/23/2020    Procedure: XI ROBOTIC LYMPHADENECTOMY;  Surgeon: Ramo Tucker MD;  [facility];  Service: Thoracic;  Laterality: Right;    TUBAL LIGATION         Social History     Socioeconomic History    Marital status:     Number of children: 2   Occupational History     Employer: [employer]   Tobacco Use    Smoking status: Former     Current packs/day: 0.00     Average packs/day: 0.5 packs/day for 40.0 years (20.0 ttl pk-yrs)     Types: Cigarettes     Start date: 1976     Quit date: 2016     Years since quittin.1     Passive exposure: Past    Smokeless tobacco: Never   Substance and Sexual Activity    Alcohol use: Yes     Alcohol/week: 1.0 standard drink of alcohol     Types: 1 Glasses of wine per week     Comment: rare- holiday    Drug use: No    Sexual activity: Not Currently     Partners: Male     Comment:      Social Drivers of Health     Financial Resource Strain: Low Risk  (10/1/2024)    Overall Financial Resource Strain (CARDIA)     Difficulty  of Paying Living Expenses: Not very hard   Food Insecurity: No Food Insecurity (10/1/2024)    Hunger Vital Sign     Worried About Running Out of Food in the Last Year: Never true     Ran Out of Food in the Last Year: Never true   Recent Concern: Food Insecurity - Food Insecurity Present (8/22/2024)    Hunger Vital Sign     Worried About Running Out of Food in the Last Year: Sometimes true     Ran Out of Food in the Last Year: Never true   Transportation Needs: No Transportation Needs (10/1/2024)    TRANSPORTATION NEEDS     Transportation : No   Physical Activity: Inactive (8/22/2024)    Exercise Vital Sign     Days of Exercise per Week: 0 days     Minutes of Exercise per Session: 0 min   Stress: Stress Concern Present (10/1/2024)    [employer] [employer] - Occupational Stress Questionnaire     Feeling of Stress : To some extent   Housing Stability: Low Risk  (10/1/2024)    Housing Stability Vital Sign     Unable to Pay for Housing in the Last Year: No     Homeless in the Last Year: No       LABS  CBC:   Recent Labs     10/02/24  0637 10/03/24  0517   WBC 5.25 5.84   RBC 3.54* 4.09   HGB 7.7* 9.3*   HCT 26.9* 31.1*    352   MCV 76* 76*   MCH 21.8* 22.7*   MCHC 28.6* 29.9*       CMP:   Recent Labs     10/02/24  0638 10/03/24  0516   * 131*   K 4.2 3.8    101   CO2 20* 20*   BUN 16 11   CREATININE 0.6 0.6   * 117*   MG 0.9* 1.0*  1.1*   PHOS 2.5* 2.2*   CALCIUM 8.5* 8.5*   ALBUMIN 1.9* 1.9*   PROT 5.4* 5.6*   ALKPHOS 77 87   ALT 13 10   AST 16 15   BILITOT 0.2 0.2       2D Echo:  Results for orders placed or performed during the hospital encounter of 10/16/18   2D echo with color flow doppler    Collection Time: 10/16/18  2:30 PM   Result Value Ref Range    EF + QEF 53 55 - 65    Mitral Valve Regurgitation TRIVIAL TO MILD     Diastolic Dysfunction No     Aortic Valve Regurgitation TRIVIAL     Est. PA Systolic Pressure 31.09     Tricuspid Valve Regurgitation TRIVIAL             Pre-op Assessment    I have reviewed the Patient Summary Reports.    I have reviewed the NPO Status.      Review of Systems  Anesthesia Hx:  No problems with previous Anesthesia             Denies Family Hx of Anesthesia complications.    Denies Personal Hx of Anesthesia complications.                    Hematology/Oncology:  Hematology Normal   Oncology Normal                                   Cardiovascular:     Hypertension    Dysrhythmias                                    Pulmonary:   COPD Asthma                    Musculoskeletal:  Musculoskeletal Normal                Endocrine:  Diabetes, type 2           Dermatological:  Skin Normal    Psych:  Psychiatric History  depression                Physical Exam  General: Well nourished, Cooperative and Alert    Airway:  Mallampati: II   Mouth Opening: Normal  TM Distance: Normal  Tongue: Normal    Dental:  Intact    Chest/Lungs:  Normal Respiratory Rate, Clear to auscultation    Heart:  Rate: Normal        Anesthesia Plan  Type of Anesthesia, risks & benefits discussed:    Anesthesia Type: Gen ETT  Intra-op Monitoring Plan: Standard ASA Monitors  Induction:  IV  Informed Consent: Informed consent signed with the Patient and all parties understand the risks and agree with anesthesia plan.  All questions answered.   ASA Score: 2    Ready For Surgery From Anesthesia Perspective.     .

## 2024-10-03 NOTE — PROGRESS NOTES
[facility] [place] [facility]  Hematology/Oncology  Progress Note    Patient Name: Hannah Montoya  Admission Date: 10/1/2024  Hospital Length of Stay: 1 days  Code Status: Full Code     Subjective:     HPI:  Hannah Montoya is a 69 y.o. female with stage IV NSCLC with pathologic fracture (follows everardo Avina), Non-Insulin-dependent DM2 without complications, HTN, HLD, and Depression who presented to [facility] ED on 10/02/2024 with diarrhea and abd cramping.  Her last cycle of ipi/nivo was 9/12/24. Reports she developed diarrhea non bloody 4 days prior to presentation. She reports 6-8 non bloody stools not relieved with loperamide.   She had 1 episode of vomiting with intermittent nausea day prior to her presentation.  She reports some mild abdominal cramping.  No fevers.  Since her hospital admission she has had no bowel movements.  Her last cycle of ipi/nivo was 9/12.  Consulted for lung cancer.     Interval History: Doing better. Abdominal pain improved. Colonoscopy planned today. She has had a couple of loose stools this morning since starting bowel  prep .  She reports nausea improved. No vomiting .     Oncology Treatment Plan:   OP NSCLC NIVOLUMAB 360 MG D1 & D22 WITH IPILIMUMAB 1 MG/KG Q6W PLUS CARBOPLATIN (AUC) PACLITAXEL Q3W X2 DOSES    Medications:  Continuous Infusions:   lactated ringers   Intravenous Continuous 150 mL/hr at 10/03/24 0236 New Bag at 10/03/24 0236     Scheduled Meds:   arformoteroL  15 mcg Nebulization BID    atorvastatin  10 mg Oral Daily    budesonide  0.5 mg Nebulization Q12H    buPROPion  150 mg Oral Daily    cetirizine  10 mg Oral QHS    enoxparin  40 mg Subcutaneous Daily    gabapentin  300 mg Oral TID    ipratropium  0.5 mg Nebulization Q6H    losartan  100 mg Oral Daily    magnesium sulfate IVPB  2 g Intravenous Q2H    meloxicam  7.5 mg Oral Daily    methocarbamoL  750 mg Oral QID    metoprolol tartrate  25 mg Oral QID    miconazole NITRATE 2 %   Topical (Top) BID     multivitamin  1 tablet Oral Daily    pantoprazole  40 mg Oral Daily    sertraline  100 mg Oral QHS     PRN Meds:  Current Facility-Administered Medications:     acetaminophen, 1,000 mg, Oral, Q8H PRN    albuterol sulfate, 2.5 mg, Nebulization, Q4H PRN    aluminum-magnesium hydroxide-simethicone, 30 mL, Oral, Q6H PRN    benzonatate, 200 mg, Oral, TID PRN    dextrose 10%, 12.5 g, Intravenous, PRN    dextrose 10%, 12.5 g, Intravenous, PRN    dextrose 10%, 25 g, Intravenous, PRN    glucagon (human recombinant), 1 mg, Intramuscular, PRN    glucagon (human recombinant), 1 mg, Intramuscular, PRN    glucose, 16 g, Oral, PRN    glucose, 24 g, Oral, PRN    insulin aspart U-100, 0-5 Units, Subcutaneous, Q6H PRN    melatonin, 6 mg, Oral, Nightly PRN    naloxone, 0.02 mg, Intravenous, PRN    oxyCODONE, 5 mg, Oral, Q4H PRN    sodium chloride 0.9%, 10 mL, Intravenous, Q12H PRN     Review of Systems   Constitutional:  Positive for appetite change and fatigue. Negative for chills and fever.   HENT:  Negative for nosebleeds.    Eyes:  Negative for photophobia and visual disturbance.   Respiratory:  Negative for chest tightness and shortness of breath.    Cardiovascular:  Negative for chest pain and palpitations.   Gastrointestinal:  Positive for diarrhea (improved) and nausea. Negative for vomiting.        Abdominal cramping, resolved   Genitourinary:  Negative for dysuria and hematuria.   Neurological:  Negative for weakness.   Psychiatric/Behavioral:  The patient is nervous/anxious.      Objective:     Vital Signs (Most Recent):  Temp: 98.5 °F (36.9 °C) (10/03/24 1137)  Pulse: 97 (10/03/24 1137)  Resp: 18 (10/03/24 1137)  BP: 134/74 (10/03/24 1137)  SpO2: (!) 93 % (10/03/24 1137) Vital Signs (24h Range):  Temp:  [97.4 °F (36.3 °C)-98.5 °F (36.9 °C)] 98.5 °F (36.9 °C)  Pulse:  [] 97  Resp:  [16-20] 18  SpO2:  [91 %-97 %] 93 %  BP: (111-138)/(60-80) 134/74     Weight: 80.5 kg (177 lb 7.5 oz)  Body mass index is 32.46  kg/m².  Body surface area is 1.88 meters squared.      Intake/Output Summary (Last 24 hours) at 10/3/2024 1226  Last data filed at 10/3/2024 0811  Gross per 24 hour   Intake 210.76 ml   Output 400 ml   Net -189.24 ml        Physical Exam     Significant Labs:   All pertinent labs from the last 24 hours have been reviewed.    Diagnostic Results:  I have reviewed and interpreted all pertinent imaging results/findings within the past 24 hours.  Assessment/Plan:     G2 Diarrhea/Colitis Immune mediated   Completed  C5 of Opdivo+Yervoy on 9/12/24  Admitted with suspected immune-mediated colitis/diarrhea  GI following  Stool studies pending  CT a/p - unremarkable  CRP elevated  GI following -GI eval including colonoscopy planned in am   Symptoms improved with supportive care  Continue supportive care  Await GI eval  Consider steroids - await findings  Hold IO    Anemia  Treatment induced  Improved   Hb 9.3 g/dL  Closely monitor counts  Consider transfusion if Hb<7g/dl     NSCLC of right lung      Completed  C5 of Opdivo+Yervoy on 9/12/24  Admitted with suspected immune-mediated colitis/diarrhea  GI following   Hold IO  Follow up with Dr. Rose as outpatient        Addendum: Pt underwent GI eval and  reveals moderate-risk colitis findings.  Begin high dose steroids pred 1mg/kg with plan to continue upon discharge with plan for slow taper. She will also start PPI and advise on close monitoring of glucose levels with close follow up with her treating Oncologist as outpatient.     Plan discussed with primary team.        Eulalia Rocha MD  Hematology/Oncology  [facility]

## 2024-10-03 NOTE — PROVATION PATIENT INSTRUCTIONS
Discharge Summary/Instructions after an Endoscopic Procedure  Patient Name: Hannah Covarrubias  Patient MRN: [id number]  Patient YOB: 1955  Thursday, October 3, 2024  Wicho Serna MD  Dear patient,  As a result of recent federal legislation (The Federal Cures Act), you may   receive lab or pathology results from your procedure in your [facility]   account before your physician is able to contact you. Your physician or   their representative will relay the results to you with their   recommendations at their soonest availability.  Thank you,  RESTRICTIONS:  During your procedure today, you received medications for sedation.  These   medications may affect your judgment, balance and coordination.  Therefore,   for 24 hours, you have the following restrictions:   - DO NOT drive a car, operate machinery, make legal/financial decisions,   sign important papers or drink alcohol.    ACTIVITY:  Today: no heavy lifting, straining or running due to procedural   sedation/anesthesia.  The following day: return to full activity including work.  DIET:  Eat and drink normally unless instructed otherwise.     TREATMENT FOR COMMON SIDE EFFECTS:  - Mild abdominal pain, nausea, belching, bloating or excessive gas:  rest,   eat lightly and use a heating pad.  - Sore Throat: treat with throat lozenges and/or gargle with warm salt   water.  - Because air was used during the procedure, expelling large amounts of air   from your rectum or belching is normal.  - If a bowel prep was taken, you may not have a bowel movement for 1-3 days.    This is normal.  SYMPTOMS TO WATCH FOR AND REPORT TO YOUR PHYSICIAN:  1. Abdominal pain or bloating, other than gas cramps.  2. Chest pain.  3. Back pain.  4. Signs of infection such as: chills or fever occurring within 24 hours   after the procedure.  5. Rectal bleeding, which would show as bright red, maroon, or black stools.   (A tablespoon of blood from the rectum is not serious, especially if    hemorrhoids are present.)  6. Vomiting.  7. Weakness or dizziness.  GO DIRECTLY TO THE NEAREST EMERGENCY ROOM IF YOU HAVE ANY OF THE FOLLOWING:      Difficulty breathing              Chills and/or fever over 101 F   Persistent vomiting and/or vomiting blood   Severe abdominal pain   Severe chest pain   Black, tarry stools   Bleeding- more than one tablespoon   Any other symptom or condition that you feel may need urgent attention  Your doctor recommends these additional instructions:  If any biopsies were taken, your doctors clinic will contact you in 1 to 2   weeks with any results.  - Await pathology results.   - Perform a colonoscopy today.   - Return patient to hospital soares.  For questions, problems or results please call your physician - Wicho Serna MD at Work:  [phone].  [facility] can be reached at [phone]     IF A COMPLICATION OR EMERGENCY SITUATION ARISES AND YOU ARE UNABLE TO REACH   YOUR PHYSICIAN - GO DIRECTLY TO THE EMERGENCY ROOM.  MD Wicho Ruth MD  10/3/2024 2:31:34 PM  This report has been verified and signed electronically.  Dear patient,  As a result of recent federal legislation (The Federal Cures Act), you may   receive lab or pathology results from your procedure in your [facility]   account before your physician is able to contact you. Your physician or   their representative will relay the results to you with their   recommendations at their soonest availability.  Thank you,  PROVATION

## 2024-10-03 NOTE — PROVATION PATIENT INSTRUCTIONS
Discharge Summary/Instructions after an Endoscopic Procedure  Patient Name: Hannah Covarrubias  Patient MRN: [id number]  Patient YOB: 1955  Thursday, October 3, 2024  Wicho Serna MD  Dear patient,  As a result of recent federal legislation (The Federal Cures Act), you may   receive lab or pathology results from your procedure in your [facility]   account before your physician is able to contact you. Your physician or   their representative will relay the results to you with their   recommendations at their soonest availability.  Thank you,  RESTRICTIONS:  During your procedure today, you received medications for sedation.  These   medications may affect your judgment, balance and coordination.  Therefore,   for 24 hours, you have the following restrictions:   - DO NOT drive a car, operate machinery, make legal/financial decisions,   sign important papers or drink alcohol.    ACTIVITY:  Today: no heavy lifting, straining or running due to procedural   sedation/anesthesia.  The following day: return to full activity including work.  DIET:  Eat and drink normally unless instructed otherwise.     TREATMENT FOR COMMON SIDE EFFECTS:  - Mild abdominal pain, nausea, belching, bloating or excessive gas:  rest,   eat lightly and use a heating pad.  - Sore Throat: treat with throat lozenges and/or gargle with warm salt   water.  - Because air was used during the procedure, expelling large amounts of air   from your rectum or belching is normal.  - If a bowel prep was taken, you may not have a bowel movement for 1-3 days.    This is normal.  SYMPTOMS TO WATCH FOR AND REPORT TO YOUR PHYSICIAN:  1. Abdominal pain or bloating, other than gas cramps.  2. Chest pain.  3. Back pain.  4. Signs of infection such as: chills or fever occurring within 24 hours   after the procedure.  5. Rectal bleeding, which would show as bright red, maroon, or black stools.   (A tablespoon of blood from the rectum is not serious, especially if    hemorrhoids are present.)  6. Vomiting.  7. Weakness or dizziness.  GO DIRECTLY TO THE NEAREST EMERGENCY ROOM IF YOU HAVE ANY OF THE FOLLOWING:      Difficulty breathing              Chills and/or fever over 101 F   Persistent vomiting and/or vomiting blood   Severe abdominal pain   Severe chest pain   Black, tarry stools   Bleeding- more than one tablespoon   Any other symptom or condition that you feel may need urgent attention  Your doctor recommends these additional instructions:  If any biopsies were taken, your doctors clinic will contact you in 1 to 2   weeks with any results.  - Await pathology results. Will plan to start solumedrol given evidence of   colitis on exam.  - Repeat colonoscopy (date not yet determined) for surveillance based on   clinical status at that time.  For questions, problems or results please call your physician - Wicho Serna MD at Work:  [phone].  [facility] can be reached at [phone]     IF A COMPLICATION OR EMERGENCY SITUATION ARISES AND YOU ARE UNABLE TO REACH   YOUR PHYSICIAN - GO DIRECTLY TO THE EMERGENCY ROOM.  MD Wicho Ruth MD  10/3/2024 3:08:11 PM  This report has been verified and signed electronically.  Dear patient,  As a result of recent federal legislation (The Federal Cures Act), you may   receive lab or pathology results from your procedure in your [facility]   account before your physician is able to contact you. Your physician or   their representative will relay the results to you with their   recommendations at their soonest availability.  Thank you,  PROVATION

## 2024-10-03 NOTE — SUBJECTIVE & OBJECTIVE
Interval History: Doing better. Abdominal pain improved. Colonoscopy planned today. She has had a couple of loose stools this morning since taking colon prep .  She reports nausea improved. No vomiting .     Oncology Treatment Plan:   OP NSCLC NIVOLUMAB 360 MG D1 & D22 WITH IPILIMUMAB 1 MG/KG Q6W PLUS CARBOPLATIN (AUC) PACLITAXEL Q3W X2 DOSES    Medications:  Continuous Infusions:   lactated ringers   Intravenous Continuous 150 mL/hr at 10/03/24 0236 New Bag at 10/03/24 0236     Scheduled Meds:   arformoteroL  15 mcg Nebulization BID    atorvastatin  10 mg Oral Daily    budesonide  0.5 mg Nebulization Q12H    buPROPion  150 mg Oral Daily    cetirizine  10 mg Oral QHS    enoxparin  40 mg Subcutaneous Daily    gabapentin  300 mg Oral TID    ipratropium  0.5 mg Nebulization Q6H    losartan  100 mg Oral Daily    magnesium sulfate IVPB  2 g Intravenous Q2H    meloxicam  7.5 mg Oral Daily    methocarbamoL  750 mg Oral QID    metoprolol tartrate  25 mg Oral QID    miconazole NITRATE 2 %   Topical (Top) BID    multivitamin  1 tablet Oral Daily    pantoprazole  40 mg Oral Daily    sertraline  100 mg Oral QHS     PRN Meds:  Current Facility-Administered Medications:     acetaminophen, 1,000 mg, Oral, Q8H PRN    albuterol sulfate, 2.5 mg, Nebulization, Q4H PRN    aluminum-magnesium hydroxide-simethicone, 30 mL, Oral, Q6H PRN    benzonatate, 200 mg, Oral, TID PRN    dextrose 10%, 12.5 g, Intravenous, PRN    dextrose 10%, 12.5 g, Intravenous, PRN    dextrose 10%, 25 g, Intravenous, PRN    glucagon (human recombinant), 1 mg, Intramuscular, PRN    glucagon (human recombinant), 1 mg, Intramuscular, PRN    glucose, 16 g, Oral, PRN    glucose, 24 g, Oral, PRN    insulin aspart U-100, 0-5 Units, Subcutaneous, Q6H PRN    melatonin, 6 mg, Oral, Nightly PRN    naloxone, 0.02 mg, Intravenous, PRN    oxyCODONE, 5 mg, Oral, Q4H PRN    sodium chloride 0.9%, 10 mL, Intravenous, Q12H PRN     Review of Systems   Constitutional:  Positive for  appetite change and fatigue. Negative for chills and fever.   HENT:  Negative for nosebleeds.    Eyes:  Negative for photophobia and visual disturbance.   Respiratory:  Negative for chest tightness and shortness of breath.    Cardiovascular:  Negative for chest pain and palpitations.   Gastrointestinal:  Positive for diarrhea (improved) and nausea. Negative for vomiting.        Abdominal cramping, resolved   Genitourinary:  Negative for dysuria and hematuria.   Neurological:  Negative for weakness.   Psychiatric/Behavioral:  The patient is nervous/anxious.      Objective:     Vital Signs (Most Recent):  Temp: 98.5 °F (36.9 °C) (10/03/24 1137)  Pulse: 97 (10/03/24 1137)  Resp: 18 (10/03/24 1137)  BP: 134/74 (10/03/24 1137)  SpO2: (!) 93 % (10/03/24 1137) Vital Signs (24h Range):  Temp:  [97.4 °F (36.3 °C)-98.5 °F (36.9 °C)] 98.5 °F (36.9 °C)  Pulse:  [] 97  Resp:  [16-20] 18  SpO2:  [91 %-97 %] 93 %  BP: (111-138)/(60-80) 134/74     Weight: 80.5 kg (177 lb 7.5 oz)  Body mass index is 32.46 kg/m².  Body surface area is 1.88 meters squared.      Intake/Output Summary (Last 24 hours) at 10/3/2024 1226  Last data filed at 10/3/2024 0811  Gross per 24 hour   Intake 210.76 ml   Output 400 ml   Net -189.24 ml        Physical Exam     Significant Labs:   All pertinent labs from the last 24 hours have been reviewed.    Diagnostic Results:  I have reviewed and interpreted all pertinent imaging results/findings within the past 24 hours.

## 2024-10-03 NOTE — PLAN OF CARE
Problem: Adult Inpatient Plan of Care  Goal: Plan of Care Review  Outcome: Progressing  Flowsheets (Taken 10/3/2024 2333)  Plan of Care Reviewed With: patient

## 2024-10-03 NOTE — SUBJECTIVE & OBJECTIVE
Interval History: reports no diarrhea overnight though return after starting bowel prep. Occasional intermittent abdominal cramping.     Review of Systems   Constitutional:  Negative for chills and fever.   Eyes:  Negative for photophobia and visual disturbance.   Respiratory:  Negative for chest tightness and shortness of breath.    Cardiovascular:  Negative for chest pain and palpitations.   Gastrointestinal:  Positive for abdominal pain and diarrhea. Negative for nausea and vomiting.   Genitourinary:  Negative for dysuria and hematuria.     Objective:     Vital Signs (Most Recent):  Temp: 98.2 °F (36.8 °C) (10/03/24 1317)  Pulse: 100 (10/03/24 1317)  Resp: 20 (10/03/24 1317)  BP: 135/63 (10/03/24 1317)  SpO2: (!) 94 % (10/03/24 1317) Vital Signs (24h Range):  Temp:  [97.4 °F (36.3 °C)-98.5 °F (36.9 °C)] 98.2 °F (36.8 °C)  Pulse:  [] 100  Resp:  [16-20] 20  SpO2:  [91 %-97 %] 94 %  BP: (111-138)/(60-80) 135/63     Weight: 80.5 kg (177 lb 7.5 oz)  Body mass index is 32.46 kg/m².    Intake/Output Summary (Last 24 hours) at 10/3/2024 1402  Last data filed at 10/3/2024 1355  Gross per 24 hour   Intake 250 ml   Output 400 ml   Net -150 ml         Physical Exam  Vitals and nursing note reviewed.   Constitutional:       General: She is not in acute distress.     Appearance: She is well-developed. She is not diaphoretic.   HENT:      Head: Normocephalic and atraumatic.      Right Ear: External ear normal.      Left Ear: External ear normal.   Eyes:      General:         Right eye: No discharge.         Left eye: No discharge.      Conjunctiva/sclera: Conjunctivae normal.   Neck:      Thyroid: No thyromegaly.   Cardiovascular:      Rate and Rhythm: Normal rate and regular rhythm.      Heart sounds: No murmur heard.  Pulmonary:      Effort: Pulmonary effort is normal. No respiratory distress.      Breath sounds: Normal breath sounds.   Abdominal:      General: Bowel sounds are normal. There is no distension.       Palpations: Abdomen is soft. There is no mass.      Tenderness: There is abdominal tenderness (mild diffuse tenderness).   Musculoskeletal:         General: No deformity.      Cervical back: Normal range of motion and neck supple.   Skin:     General: Skin is warm and dry.   Neurological:      Mental Status: She is alert and oriented to person, place, and time.      Sensory: No sensory deficit.   Psychiatric:         Mood and Affect: Mood normal.         Behavior: Behavior normal.             Significant Labs: CBC:   Recent Labs   Lab 10/02/24  0637 10/03/24  0517   WBC 5.25 5.84   HGB 7.7* 9.3*   HCT 26.9* 31.1*    352     CMP:   Recent Labs   Lab 10/02/24  0638 10/03/24  0516   * 131*   K 4.2 3.8    101   CO2 20* 20*   * 117*   BUN 16 11   CREATININE 0.6 0.6   CALCIUM 8.5* 8.5*   PROT 5.4* 5.6*   ALBUMIN 1.9* 1.9*   BILITOT 0.2 0.2   ALKPHOS 77 87   AST 16 15   ALT 13 10   ANIONGAP 11 10       Significant Imaging:   Imaging Results              CT Abdomen Pelvis With IV Contrast Routine Oral Contrast (Final result)  Result time 10/01/24 20:24:42      Final result by Dwaine Clay MD (10/01/24 20:24:42)                   Impression:      1. No acute intra-abdominal abnormalities identified.  2. Persistent right lower lobe collapse and consolidation with small right pleural effusion.  3. Postsurgical changes and additional findings as detailed above.      Electronically signed by: Dwaine Clay MD  Date:    10/01/2024  Time:    20:24               Narrative:    EXAMINATION:  CT ABDOMEN PELVIS WITH IV CONTRAST    CLINICAL HISTORY:  Abdominal pain, acute, nonlocalized;    TECHNIQUE:  Low dose axial images, sagittal and coronal reformations were obtained from the lung bases to the pubic symphysis following the IV administration of 75 mL of Omnipaque 350 .  Oral contrast was not given.    COMPARISON:  CT chest abdomen pelvis 09/18/2024.    FINDINGS:  Heart is normal in size.  Pacer wires  are partially visualized.  There is persistent right lower lobe collapse/consolidation with small right pleural effusion.  Left lung base is relatively clear.    No significant hepatic abnormalities are identified.  There is no intra-or extrahepatic biliary ductal dilatation.  Gallbladder has been removed.  The stomach, pancreas, and right adrenal gland are unremarkable.  There is mild left adrenal nodular thickening.  Spleen is mildly enlarged measuring 13 cm.    Kidneys enhance normally with no evidence of hydronephrosis.  No abnormalities are seen along the ureteral courses.  Urinary bladder is nondistended.  There is mild lobular configuration of the uterus with suspected small underlying fibroids seen.    Appendix is visualized and is unremarkable.  The visualized loops of small and large bowel show no evidence of obstruction or inflammation.  No free air or free fluid.    Aorta tapers normally.    No acute osseous abnormality identified.  There is lower lumbar facet arthropathy.  Similar appearance of the left hip with associated adjacent subcutaneous soft tissue thickening and stranding.  Postoperative ORIF changes are seen involving the left proximal femur.

## 2024-10-03 NOTE — H&P
Short Stay Endoscopy History and Physical    PCP - Emanuel Chavez MD    Procedure - Colonoscopy  ASA - per anesthesia  Mallampati - per anesthesia  History of Anesthesia problems - no  Family history Anesthesia problems -  no   Plan of anesthesia - General    HPI:  This is a 69 y.o. female here for evaluation of : checkpoint inhibitor colitis/enteritis    ROS:  Constitutional: No fevers, chills  CV: No chest pain  Pulm: No shortness of breath  GI: see HPI  Derm: No rash    Medical History:  has a past medical history of AICD (automatic cardioverter/defibrillator) present, Asthma, Breast cancer (), Cardiac pacemaker, Cardiomyopathy, COPD (chronic obstructive pulmonary disease), Diabetes mellitus, type 2, Hyperglycemia, Hyperlipidemia, Hypertension, Malignant neoplasm of overlapping sites of female breast (2016), Nuclear sclerosis of both eyes (2020), and Respiratory distress (3/12/2020).    Surgical History:  has a past surgical history that includes Cholecystectomy;  section; Tubal ligation; Breast biopsy (Right, 2016); Breast lumpectomy (Right); Revision of implantable cardioverter-defibrillator (ICD) electrode lead placement (N/A, 6/15/2018); Cardiac catheterization (Bilateral, 2017); Cardiac defibrillator placement (Left, 08/10/2017); Cardiac defibrillator placement (Left, 2018); Lung biopsy (N/A, 2020); Navigational bronchoscopy (N/A, 10/13/2020); Robot-assisted laparoscopic lymphadenectomy using da Temi Xi (Right, 10/23/2020); Insertion of tunneled central venous catheter (CVC) with subcutaneous port (Right, 2020); biopsy, with ct guidance (Right, 2023); Intramedullary rodding of femur (Left, 2024); and Femur biopsy (Left, 2024).    Family History: family history includes Anxiety disorder in her daughter and son; Cataracts in her father and mother; Clotting disorder in her brother; Kidney disease in her father and mother; Macular degeneration  in her maternal grandmother; No Known Problems in her maternal aunt, maternal grandfather, maternal uncle, paternal aunt, paternal grandfather, paternal grandmother, paternal uncle, and sister.. Otherwise no colon cancer, inflammatory bowel disease, or GI malignancies.    Social History:  reports that she quit smoking about 8 years ago. Her smoking use included cigarettes. She started smoking about 48 years ago. She has a 20 pack-year smoking history. She has been exposed to tobacco smoke. She has never used smokeless tobacco. She reports current alcohol use of about 1.0 standard drink of alcohol per week. She reports that she does not use drugs.    Review of patient's allergies indicates:   Allergen Reactions    Taxol [paclitaxel]      Hypersensitivity reaction to taxol, symptoms included shortness of breath, nausea, dizziness, flushing     Carboplatin Other (See Comments)     Itching and hives    Adhesive Rash     tegaderm burns and blistered skin       Medications:   Medications Prior to Admission   Medication Sig Dispense Refill Last Dose/Taking    ACCU-CHEK ALFRED PLUS TEST STRP Strp USE TO TEST THREE TIMES DAILY 200 strip 3 9/30/2024    acetaminophen (TYLENOL) 500 MG tablet Take 2 tablets (1,000 mg total) by mouth every 8 (eight) hours as needed for Pain.   9/30/2024    albuterol (ACCUNEB) 1.25 mg/3 mL Nebu use 1 AMPULE (3ml) via NEBULIZER EVERY 6 HOURS AS NEEDED 300 mL 3 9/30/2024    albuterol (VENTOLIN HFA) 90 mcg/actuation inhaler Inhale 2 puffs into the lungs every 4 (four) hours as needed for Wheezing or Shortness of Breath. Rescue 18 g 4 9/30/2024    amLODIPine (NORVASC) 5 MG tablet TAKE 1 TABLET BY MOUTH EVERY DAY 90 tablet 2 9/30/2024    atorvastatin (LIPITOR) 10 MG tablet TAKE 1 TABLET BY MOUTH EVERY DAY 90 tablet 1 9/30/2024    benzonatate (TESSALON) 200 MG capsule Take 1 capsule (200 mg total) by mouth 3 (three) times daily as needed for Cough. 30 capsule 1 9/30/2024    buPROPion (WELLBUTRIN XL) 150  MG TB24 tablet TAKE 1 TABLET BY MOUTH EVERY DAY 90 tablet 3 9/30/2024    cetirizine (ZYRTEC) 10 MG tablet Take 10 mg by mouth every evening.    9/30/2024    cholecalciferol, vitamin D3, (VITAMIN D3) 50 mcg (2,000 unit) Tab Take 1 tablet (2,000 Units total) by mouth once daily. 30 tablet 11 9/30/2024    gabapentin (NEURONTIN) 300 MG capsule Take 1 capsule (300 mg total) by mouth 3 (three) times daily. 90 capsule 11 9/30/2024    losartan (COZAAR) 100 MG tablet TAKE 1 TABLET BY MOUTH EVERY DAY 90 tablet 3 9/30/2024    meloxicam (MOBIC) 7.5 MG tablet Take 1 tablet (7.5 mg total) by mouth once daily. 30 tablet 1 9/30/2024    metFORMIN (GLUCOPHAGE) 500 MG tablet TAKE 2 TABLETS BY MOUTH TWICE A DAY WITH MEALS 360 tablet 3 9/30/2024    methocarbamoL (ROBAXIN) 750 MG Tab Take 1 tablet (750 mg total) by mouth 4 (four) times daily. for 10 days 40 tablet 0 9/30/2024    metoprolol succinate (TOPROL-XL) 100 MG 24 hr tablet TAKE 1 TABLET BY MOUTH EVERY DAY 90 tablet 3 9/30/2024    multivitamin (THERAGRAN) per tablet Take 1 tablet by mouth once daily.   9/30/2024    nystatin (MYCOSTATIN) powder Apply topically 2 (two) times daily. 60 g 1 9/30/2024    ondansetron (ZOFRAN-ODT) 8 MG TbDL Take 1 tablet (8 mg total) by mouth every 8 (eight) hours as needed (nausea/vomiting). Take 1 tablet (8 mg) by mouth every 8 hours as needed for nausea/vomiting. 60 tablet 5 9/30/2024    oxyCODONE (ROXICODONE) 5 MG immediate release tablet Take 1 tablet (5 mg total) by mouth every 6 (six) hours as needed for Pain. 28 tablet 0 9/30/2024    palonosetron (ALOXI) 0.25 mg/5 mL injection    9/30/2024    pantoprazole (PROTONIX) 40 MG tablet TAKE 1 TABLET BY MOUTH EVERY DAY 90 tablet 3 9/30/2024    READI-CAT 2 2 % (w/v) suspension    9/30/2024    READI-CAT 2 2.1 % (w/v), 2.0 % (w/w) suspension    9/30/2024    sertraline (ZOLOFT) 100 MG tablet TAKE 1 TABLET BY MOUTH EVERY DAY IN THE EVENING 90 tablet 2 9/30/2024    tiotropium (SPIRIVA WITH HANDIHALER) 18 mcg  inhalation capsule INHALE THE CONTENTS OF 1 CAPSULE BY MOUTH EVERY DAY 90 capsule 3 9/30/2024    WIXELA INHUB 250-50 mcg/dose diskus inhaler INHALE 1 PUFF INTO THE LUNGS 2 (TWO) TIMES DAILY. CONTROLLER 180 each 3 9/30/2024         Physical Exam:    Vital Signs:   Vitals:    10/03/24 1137   BP: 134/74   Pulse: 97   Resp: 18   Temp: 98.5 °F (36.9 °C)       General Appearance: Well appearing in no acute distress  Eyes:    No scleral icterus  ENT: lips and tongue normal  Lungs: no use of accessory muscles  Heart:  normal rate, regular rhythm  Abdomen: Soft, non tender, non distended   Extremities: no edema  Skin: No rash      Labs:  Lab Results   Component Value Date    WBC 5.84 10/03/2024    HGB 9.3 (L) 10/03/2024    HCT 31.1 (L) 10/03/2024     10/03/2024    CHOL 128 06/06/2024    TRIG 177 (H) 06/06/2024    HDL 38 (L) 06/06/2024    ALT 10 10/03/2024    AST 15 10/03/2024     (L) 10/03/2024    K 3.8 10/03/2024     10/03/2024    CREATININE 0.6 10/03/2024    BUN 11 10/03/2024    CO2 20 (L) 10/03/2024    TSH 5.169 (H) 09/30/2024    INR 1.0 08/19/2024    HGBA1C 6.1 (H) 08/19/2024       I have explained the risks and benefits of endoscopy procedures to the patient including but not limited to bleeding, perforation, infection, and death.  The patient was asked if they understand and allowed to ask any further questions to their satisfaction.    Wicho Serna MD

## 2024-10-03 NOTE — PROGRESS NOTES
INFORMED CONSENT/ LIMITS of CONFIDENTIALITY: Prior to beginning the interview, the patient's identification was confirmed using two identifiers.  Hannah Montoya was informed of the possible risks and benefits of psychological interventions (e.g., counseling, psychotherapy, testing) and provided information regarding the handling of protected health records and   the limits of confidentiality, including the importance of reporting any suicidal or homicidal ideation to ensure safety of all parties and the duty to protect children, seniors, or persons with disabilities. This provider explained the purpose of today's appointment and the patient was provided with time to ask questions regarding this information.  Acceptance and understanding of these conditions was expressed, and Hannah Montoya freely consented to this evaluation.     TELEMEDICINE PSYCHO-ONCOLOGY INTAKE    Consultation started: 9/26/2024 at 9:02 AM  The chief complaint leading to consultation is: adaptation to disease and treatment  Virtual visit with synchronous audio and video    The patient location is: Patient home in [place], [place]  (Provider licensed in [place], [place], [place])  Also present with the patient at the time of the consultation:  son (in the room, but only occasional contributions to interview)    Each patient provided medical services by telemedicine is:  (1) informed of the relationship between the physician and patient and the respective role of any other health care provider with respect to management of the patient; and (2) notified that he or she may decline to receive medical services by telemedicine and may withdraw from such care at any time    Crisis Disclaimer: Patient was informed that due to the virtual nature of the visit, that if a crisis develops, protocols will be implemented to ensure patient safety, including but not limited to: 1) Initiating a welfare check with local Law Enforcement, 2) Calling 911/National Crisis  Hotline, and/or 3) Initiating PEC/CEC procedures.     Diagnostic Interview - CPT 59981    Site of Clinician: [facility]     Evaluation Length (direct face-to-face time):  1.5 hours     Referral Source: Oncologist:Javi Avina MD    PCP: Emanuel Chavez MD    Clinical status of patient: Outpatient    Hannah Montoya, a 69 y.o. female, seen for initial evaluation visit.  Met with patient.    Chief complaint/reason for encounter: adjustment to illness, depression and sleep    Medical/Surgical History:    Patient Active Problem List   Diagnosis    Essential hypertension    Type 2 diabetes mellitus without complication    Seasonal allergies    Left bundle-branch block    Dilated cardiomyopathy    NICM (nonischemic cardiomyopathy)    Cardiac resynchronization therapy defibrillator (CRT-D) in place    Lung mass    Refractive error    Nuclear sclerosis of both eyes    NSCLC of right lung    Class 2 severe obesity due to excess calories with serious comorbidity in adult    Abnormal thyroid function test    Moderate episode of recurrent major depressive disorder    Lytic bone lesion of left femur s/p IM nail on 2024    Hypomagnesemia    Pathological fracture of intertrochanteric section of femur    Anemia    Malignant neoplasm metastatic to bone    Left hip pain    Acute colitis    Diarrhea    Antineoplastic chemotherapy induced anemia       Health Behaviors:       ETOH Use: Yes (rare)       Tobacco Use: No   THC use: No   Non-prescribed/Illicit Drug Use:  No     Prescription Misuse:No   Caffeine: minimal   Exercise: Physical therapy.   Advanced directives:Yes     Family History:   Psychiatric illness: Yes (Both kids- anxiety disorders)    Alcohol/Drug Abuse: No     Suicide: No      Past Psychiatric History:   Inpatient treatment: No     Outpatient treatment: Yes (1x grief therapy when  )    Prior substance abuse treatment: No     Suicide Attempts: No     Psychotropic Medications:   Current: Wellbutrin (since 2020 with benefit) and Zoloft (since 2014)       Past: Xanax    Current medications as per below, allergies reviewed in chart.    No current facility-administered medications for this visit.     No current outpatient medications on file.     Facility-Administered Medications Ordered in Other Visits   Medication Frequency    acetaminophen tablet 1,000 mg Q8H PRN    albuterol sulfate nebulizer solution 2.5 mg Q4H PRN    aluminum-magnesium hydroxide-simethicone 200-200-20 mg/5 mL suspension 30 mL Q6H PRN    arformoteroL nebulizer solution 15 mcg BID    atorvastatin tablet 10 mg Daily    benzonatate capsule 200 mg TID PRN    budesonide nebulizer solution 0.5 mg Q12H    buPROPion TB24 tablet 150 mg Daily    cetirizine tablet 10 mg QHS    dextrose 10% bolus 125 mL 125 mL PRN    dextrose 10% bolus 125 mL 125 mL PRN    dextrose 10% bolus 250 mL 250 mL PRN    enoxaparin injection 40 mg Daily    fentaNYL injection 25 mcg Q5 Min PRN    gabapentin capsule 300 mg TID    glucagon (human recombinant) injection 1 mg PRN    glucagon (human recombinant) injection 1 mg PRN    glucose chewable tablet 16 g PRN    glucose chewable tablet 24 g PRN    haloperidol lactate injection 0.5 mg Once PRN    HYDROmorphone injection 0.2 mg Q5 Min PRN    insulin aspart U-100 pen 0-5 Units Q6H PRN    ipratropium 0.02 % nebulizer solution 0.5 mg Q6H    lactated ringers infusion Continuous    losartan tablet 100 mg Daily    melatonin tablet 6 mg Nightly PRN    meloxicam tablet 7.5 mg Daily    methocarbamoL tablet 750 mg QID    metoprolol tartrate (LOPRESSOR) tablet 25 mg QID    miconazole NITRATE 2 % top powder BID    multivitamin tablet Daily    naloxone 0.4 mg/mL injection 0.02 mg PRN    ondansetron injection 4 mg Once PRN    oxyCODONE immediate release tablet 5 mg Q4H PRN    pantoprazole EC tablet 40 mg Daily    polyethylene glycol (GoLYTELY) solution Once    [START ON 10/3/2024] polyethylene glycol (GoLYTELY) solution Once     sertraline tablet 100 mg QHS    sodium chloride 0.9% flush 10 mL PRN    sodium chloride 0.9% flush 10 mL Q12H PRN       Social situation  Living/Social History  Current living situation: lives with her son in [place], [place]  Marital status: ,   in   Family of Origin: Parents:   Siblings:1 brother, nurse in [place]  Children/Dependents: no dependents, son (Jacoby) lives with her to help her, daughter (Bisi) lives nearby and sees her daily; 3 grandchildren  Social support: good social support from a limited number of supporters (kids, 1 friend,  brother)   Spiritual/[employer]ism: an active member of the [employer].  Hobbies: neha, knitting, beading, reading, crosswords  Stressors: Current: Complicated medical illness  Additional stressors: none  Strengths:Housing stability, Able to vocalize needs, Motivation, readiness for change, Vocational interests, hobbies and/or talents, and Financial stability    Occupational History  Type of work:   Work status:   retired due to health problems and 's care needs;     Status: no.       Current Evaluation:     Mental Status Exam: Hannah Montoya arrived promptly for the assessment session. Her son was also present during parts of the interview, with the consent of Hannah Rigoleeann Montoya.  The patient was fully cooperative throughout the interview and was an adequate historian   Appearance: age appropriate, casually  dressed, adequately  groomed  Behavior/Cooperation: friendly and cooperative  Speech: normal in rate, volume, and tone and appropriate quality, quantity and organization of sentences  Mood: depressed  Affect: mood congruent  Thought Process: goal-directed, logical  Thought Content: normal, no suicidality, no homicidality, delusions, or paranoia;did not appear to be responding to internal stimuli during the interview.   Orientation: grossly intact  Memory: Grossly intact  Attention Span/Concentration: Attends  to interview without distraction; reports subjective difficulty  Fund of Knowledge: average  Estimate of Intelligence: average from verbal skills and history  Cognition: grossly intact  Insight: patient has awareness of illness; good insight into own behavior and behavior of others  Judgment: the patient's behavior is adequate to circumstances        9/23/2024     6:01 PM 9/12/2024     9:06 AM 9/7/2024     9:04 PM 8/26/2024     9:04 AM 8/21/2024    12:01 PM 8/16/2024     1:06 PM 8/1/2024    12:39 PM   DISTRESS SCREENING   Distress Score 5 5 5  7  5  5  4    Practical Concerns Taking care of myself  Taking care of myself;Finances  None of these  Taking care of myself;Taking care of others  Taking care of myself;Taking care of others  Taking care of myself    Social Concerns None of these  None of these  None of these  None of these  None of these  None of these    Emotional Concerns Loss of interest or enjoyment;Feelings of worthlessness or being a burden  Worry or anxiety;Sadness or depression;Feelings of worthlessness or being a burden  Worry or anxiety  None of these  None of these  None of these    Spiritual or Judaism Concerns None of these  None of these  None of these  None of these  None of these  None of these    Physical Concerns Pain;Sleep;Loss or change of physical abilities  Pain;Loss or change of physical abilities  Pain;Loss or change of physical abilities  Pain;Loss or change of physical abilities  Pain;Loss or change of physical abilities  Pain;Loss or change of physical abilities    Other Problems    None           Patient-reported        PHQ ANSWERS    Q1. Little interest or pleasure in doing things: Nearly every day (09/23/24 1804)  Q2. Feeling down, depressed, or hopeless: More than half the days (09/23/24 1804)  Q3. Trouble falling or staying asleep, or sleeping too much: More than half the days (09/23/24 1804)  Q4. Feeling tired or having little energy: Nearly every day (09/23/24 1804)  Q5.  Poor appetite or overeating: More than half the days (09/23/24 1804)  Q6. Feeling bad about yourself - or that you are a failure or have let yourself or your family down: Nearly every day (09/23/24 1804)  Q7. Trouble concentrating on things, such as reading the newspaper or watching television: Nearly every day (09/23/24 1804)  Q8. Moving or speaking so slowly that other people could have noticed. Or the opposite - being so fidgety or restless that you have been moving around a lot more than usual: More than half the days (09/23/24 1804)  Q9.      PHQ8 Score : 20 (09/23/24 1804)  PHQ-9 Total Score: 20 (09/23/24 1804)     MICAH-7 Answers        9/23/2024     6:03 PM   GAD7   1. Feeling nervous, anxious, or on edge? 3   2. Not being able to stop or control worrying? 3   3. Worrying too much about different things? 2   4. Trouble relaxing? 3   5. Being so restless that it is hard to sit still? 1   6. Becoming easily annoyed or irritable? 3   7. Feeling afraid as if something awful might happen? 3   8. If you checked off any problems, how difficult have these problems made it for you to do your work, take care of things at home, or get along with other people? 3   MICAH-7 Score 18     MICAH-7 Score: 18  Interpretation: Severe Anxiety     History of present illness:    Oncology History   NSCLC of right lung   9/29/2020 Cancer Staged    Staging form: Lung, AJCC 8th Edition  - Clinical stage from 9/29/2020: Stage IIIA (cT3, cN1, cM0)     10/14/2020 Initial Diagnosis    NSCLC of right lung     11/17/2020 - 12/30/2020 Radiation Therapy    Treating physician: Harvinder Lara     Site  Technique  Energy  Dose/Fx (Gy)  #Fx  Total Dose (Gy)    RLL Lung  VMAT  6X  2  30 / 30  60         2/6/2023 -  Chemotherapy    Treatment Summary   Plan Name: OP NSCLC NIVOLUMAB 360 MG D1 & D22 WITH IPILIMUMAB 1 MG/KG Q6W PLUS CARBOPLATIN (AUC) PACLITAXEL Q3W X2 DOSES  Treatment Goal: Control  Status: Active  Start Date: 2/6/2023  End Date: 2/19/2026  "(Planned)  Provider: Javi Avina MD  Chemotherapy: CARBOplatin (PARAPLATIN) 525 mg in sodium chloride 0.9% 337.5 mL chemo infusion, 525 mg, Intravenous, Clinic/HOD 1 time, 1 of 1 cycle  Administration: 525 mg (2/6/2023), 490 mg (2/27/2023)  PACLitaxeL (TAXOL) 200 mg/m2 = 396 mg in sodium chloride 0.9% 500 mL chemo infusion, 200 mg/m2 = 396 mg (100 % of original dose 200 mg/m2), Intravenous, Clinic/HOD 1 time, 1 of 1 cycle  Dose modification: 200 mg/m2 (original dose 200 mg/m2, Cycle 1), 200 mg/m2 (original dose 200 mg/m2, Cycle 1)  Administration: 396 mg (2/6/2023), 396 mg (2/27/2023)     6/12/2023 - 6/30/2023 Radiation Therapy    Treating physician: Harvinder Lara    Site Technique Energy Dose/Fx (Gy) #Fx Total Dose (Gy)   RLL Lung RtLung 6X 4 15 / 15 60        7/31/2024 Cancer Staged    Staging form: Lung, AJCC 8th Edition  - Clinical stage from 7/31/2024: Stage DEREK (cT3, cN2, cM1a)         Hannah Montoya has adjusted to illness adequately until her recent metastasis to her hip bone/break. Since then she has been "overwhelmed." She feels she is "very very depressed." (See below) She states she is "normally very active" but can do very little since her hip surgery (8/20/24).  SHe had been "progressing well" with physical therapy until 3 weeks after surgery when she suddenly began having excruciating pain (without fall or other incident). SHe is demoralized by this change (feels she was "pushed back to the beginning of recovery"). She has since been to the ED and ortho clinic. She reports no post-operative changes have been found to explain her increased pain. This has made her very fearful of movement (but she is fully engaged in PT).  SHe is very distressed and worries that her doctors are "not being transparent" with her. She had a prior medical event (pacemaker difficulties) which taught her to advocate for herself, so she is considering seeking a second opinion if an explanation for her change " "in functioning is not expressed to her, soon. Mrs. Montoya sonia primarily through active coping strategies, focus on alternative activities, focus on family, and prayer. She has engaged in appropriate information gathering.  The patient has good family support.  Her support system is coping adequately with the diagnosis/treatment/prognosis. Illness-related psychosocial stressors include difficulty meeting family responsibilities and changes in ability to engage in leisure activities.  The patient has a good partnership with [place] [facility] [place] [place]. The patient reports the following barriers to cancer care:none.     Areas assessed:   Mood: Depression: depressed mood, diminished interest, weight loss, insomnia, psychomotor retardation, fatigue, worthlessness/guilt, poor concentration, tearfulness, and social isolation;  prior depression:since childhood, patient previously diagnosed with "Seasonal Affective Disorder" by her PCP- no psychiatry referral, no therapy other than medications ; no SI/HI; PHQ-9=20  Shira: Denies  Psychosis: Denies   Anxiety: Feeling nervous, anxious, or on edge, Uncontrollable worry (about health), Excessive worry (interfering with mood), Difficulty relaxing, Restlessness, Irritability, and Fear of unknown; no prior;  MICAH-7=18  Generalized anxiety: Denies    Panic Disorder: Denies  Trauma: "medical trauma" during work-up for pacemaker, not fully assessed  Substance abuse: denied  Cognitive functioning: denied  Health behaviors:  sedentary since break  Sleep: Time in bed: in bed most of the day;  Time asleep: 7-8 hours, interrupted; restless sleep  and sleep interrupted by pain ,  several x WASO (every few hours, with re-onset difficulty), daily naps 2-3 hours/day since cancer treatment , (+) sleep hygiene considerations, no use of OTC/melatonin/hypnotics/benzodiazepines    Pain: Ms. Montoya reports significant pain. Some benefit of recently added Neurontin, Pain is her major symptom " management concern         Assessment - Diagnosis - Goals:       ICD-10-CM ICD-9-CM   1. Moderate episode of recurrent major depressive disorder  F33.1 296.32   2. NSCLC of right lung  C34.91 162.9       Plan:individual psychotherapy and medication management by physician    Summary and Recommendations  Hannah Montoya is a 69 y.o. female referred by Dr. Avina for psychological evaluation and treatment.  Ms. Montoya appears to be coping with significant difficulty with her recent progression of disease/orthopedic surgery. Her depression has increased sharply, but she denies suicidal ideation. Patient may benefit from referral to Psychiatry for medication evaluation/management (no prior psychiatric evaluation). She is interested in CBT to address depression and insomnia and will follow up with me for that purpose. She was encouraged to consider re-connection with palliative care.    GOALS:   Increase PES  Decide on second opinion re: ortho needs  Discuss light therapy next visit?  Out of bed alone (as per PT)      Geovanni Murphy, PhD  Clinical Psychologist  [place] License #[id number]  [place] License #[id number]  [place] License #[id number]

## 2024-10-03 NOTE — PLAN OF CARE
POC in progress. Colonoscpoy/EGD completed today. Pt resumed a clear liquid diet. No acute issues. Pt remains free of falls. PID placed R FA. SR up x2. Bed in lowest position. Purewick changed. Call light within reach.   Problem: Adult Inpatient Plan of Care  Goal: Plan of Care Review  Outcome: Progressing     Problem: Diabetes Comorbidity  Goal: Blood Glucose Level Within Targeted Range  Outcome: Progressing     Problem: Infection  Goal: Absence of Infection Signs and Symptoms  Outcome: Progressing     Problem: Skin Injury Risk Increased  Goal: Skin Health and Integrity  Outcome: Progressing

## 2024-10-04 LAB
ALBUMIN SERPL BCP-MCNC: 1.8 G/DL (ref 3.5–5.2)
ALP SERPL-CCNC: 72 U/L (ref 55–135)
ALT SERPL W/O P-5'-P-CCNC: 8 U/L (ref 10–44)
ANION GAP SERPL CALC-SCNC: 10 MMOL/L (ref 8–16)
AST SERPL-CCNC: 10 U/L (ref 10–40)
BASOPHILS # BLD AUTO: 0.02 K/UL (ref 0–0.2)
BASOPHILS NFR BLD: 0.5 % (ref 0–1.9)
BILIRUB SERPL-MCNC: 0.2 MG/DL (ref 0.1–1)
BUN SERPL-MCNC: 9 MG/DL (ref 8–23)
CALCIUM SERPL-MCNC: 8.5 MG/DL (ref 8.7–10.5)
CHLORIDE SERPL-SCNC: 103 MMOL/L (ref 95–110)
CO2 SERPL-SCNC: 21 MMOL/L (ref 23–29)
CREAT SERPL-MCNC: 0.6 MG/DL (ref 0.5–1.4)
DIFFERENTIAL METHOD BLD: ABNORMAL
EOSINOPHIL # BLD AUTO: 0 K/UL (ref 0–0.5)
EOSINOPHIL NFR BLD: 0 % (ref 0–8)
ERYTHROCYTE [DISTWIDTH] IN BLOOD BY AUTOMATED COUNT: 19 % (ref 11.5–14.5)
EST. GFR  (NO RACE VARIABLE): >60 ML/MIN/1.73 M^2
GLUCOSE SERPL-MCNC: 113 MG/DL (ref 70–110)
HCT VFR BLD AUTO: 27.5 % (ref 37–48.5)
HGB BLD-MCNC: 7.9 G/DL (ref 12–16)
IMM GRANULOCYTES # BLD AUTO: 0.15 K/UL (ref 0–0.04)
IMM GRANULOCYTES NFR BLD AUTO: 3.8 % (ref 0–0.5)
LYMPHOCYTES # BLD AUTO: 0.4 K/UL (ref 1–4.8)
LYMPHOCYTES NFR BLD: 8.9 % (ref 18–48)
MAGNESIUM SERPL-MCNC: 1.3 MG/DL (ref 1.6–2.6)
MCH RBC QN AUTO: 21.6 PG (ref 27–31)
MCHC RBC AUTO-ENTMCNC: 28.7 G/DL (ref 32–36)
MCV RBC AUTO: 75 FL (ref 82–98)
MONOCYTES # BLD AUTO: 0.5 K/UL (ref 0.3–1)
MONOCYTES NFR BLD: 12.2 % (ref 4–15)
NEUTROPHILS # BLD AUTO: 2.9 K/UL (ref 1.8–7.7)
NEUTROPHILS NFR BLD: 74.6 % (ref 38–73)
NRBC BLD-RTO: 0 /100 WBC
PHOSPHATE SERPL-MCNC: 3.2 MG/DL (ref 2.7–4.5)
PLATELET # BLD AUTO: 290 K/UL (ref 150–450)
PMV BLD AUTO: 10.7 FL (ref 9.2–12.9)
POCT GLUCOSE: 113 MG/DL (ref 70–110)
POCT GLUCOSE: 144 MG/DL (ref 70–110)
POCT GLUCOSE: 162 MG/DL (ref 70–110)
POTASSIUM SERPL-SCNC: 3.7 MMOL/L (ref 3.5–5.1)
PROT SERPL-MCNC: 5.3 G/DL (ref 6–8.4)
RBC # BLD AUTO: 3.65 M/UL (ref 4–5.4)
SODIUM SERPL-SCNC: 134 MMOL/L (ref 136–145)
TTG IGA SER-ACNC: 0.5 U/ML
WBC # BLD AUTO: 3.94 K/UL (ref 3.9–12.7)

## 2024-10-04 PROCEDURE — 97535 SELF CARE MNGMENT TRAINING: CPT | Mod: HCNC

## 2024-10-04 PROCEDURE — 97162 PT EVAL MOD COMPLEX 30 MIN: CPT | Mod: HCNC

## 2024-10-04 PROCEDURE — 94640 AIRWAY INHALATION TREATMENT: CPT | Mod: HCNC

## 2024-10-04 PROCEDURE — 25000003 PHARM REV CODE 250: Mod: HCNC | Performed by: NURSE PRACTITIONER

## 2024-10-04 PROCEDURE — 99232 SBSQ HOSP IP/OBS MODERATE 35: CPT | Mod: HCNC,,, | Performed by: NURSE PRACTITIONER

## 2024-10-04 PROCEDURE — 25000242 PHARM REV CODE 250 ALT 637 W/ HCPCS: Mod: HCNC | Performed by: STUDENT IN AN ORGANIZED HEALTH CARE EDUCATION/TRAINING PROGRAM

## 2024-10-04 PROCEDURE — 85025 COMPLETE CBC W/AUTO DIFF WBC: CPT | Mod: HCNC

## 2024-10-04 PROCEDURE — 25000003 PHARM REV CODE 250: Mod: HCNC

## 2024-10-04 PROCEDURE — 97530 THERAPEUTIC ACTIVITIES: CPT | Mod: HCNC

## 2024-10-04 PROCEDURE — 36415 COLL VENOUS BLD VENIPUNCTURE: CPT | Mod: HCNC

## 2024-10-04 PROCEDURE — 97165 OT EVAL LOW COMPLEX 30 MIN: CPT | Mod: HCNC

## 2024-10-04 PROCEDURE — 11000001 HC ACUTE MED/SURG PRIVATE ROOM: Mod: HCNC

## 2024-10-04 PROCEDURE — 25000003 PHARM REV CODE 250: Mod: HCNC | Performed by: PHYSICIAN ASSISTANT

## 2024-10-04 PROCEDURE — 63600175 PHARM REV CODE 636 W HCPCS: Mod: HCNC

## 2024-10-04 PROCEDURE — 83735 ASSAY OF MAGNESIUM: CPT | Mod: HCNC

## 2024-10-04 PROCEDURE — 80053 COMPREHEN METABOLIC PANEL: CPT | Mod: HCNC

## 2024-10-04 PROCEDURE — 27200667 HC PACEMAKER, TEMPORARY MONITORING, PER SHIFT: Mod: HCNC

## 2024-10-04 PROCEDURE — 84100 ASSAY OF PHOSPHORUS: CPT | Mod: HCNC

## 2024-10-04 PROCEDURE — 63600175 PHARM REV CODE 636 W HCPCS: Mod: HCNC | Performed by: PHYSICIAN ASSISTANT

## 2024-10-04 PROCEDURE — 94761 N-INVAS EAR/PLS OXIMETRY MLT: CPT | Mod: HCNC

## 2024-10-04 RX ORDER — IPRATROPIUM BROMIDE 0.5 MG/2.5ML
0.5 SOLUTION RESPIRATORY (INHALATION)
Status: DISCONTINUED | OUTPATIENT
Start: 2024-10-04 | End: 2024-10-07 | Stop reason: HOSPADM

## 2024-10-04 RX ORDER — PREDNISONE 20 MG/1
80 TABLET ORAL DAILY
Status: DISCONTINUED | OUTPATIENT
Start: 2024-10-05 | End: 2024-10-07 | Stop reason: HOSPADM

## 2024-10-04 RX ORDER — SIMETHICONE 80 MG
1 TABLET,CHEWABLE ORAL ONCE
Status: COMPLETED | OUTPATIENT
Start: 2024-10-04 | End: 2024-10-04

## 2024-10-04 RX ORDER — SIMETHICONE 80 MG
1 TABLET,CHEWABLE ORAL
Status: DISCONTINUED | OUTPATIENT
Start: 2024-10-04 | End: 2024-10-07 | Stop reason: HOSPADM

## 2024-10-04 RX ORDER — MAGNESIUM SULFATE HEPTAHYDRATE 40 MG/ML
2 INJECTION, SOLUTION INTRAVENOUS ONCE
Status: COMPLETED | OUTPATIENT
Start: 2024-10-04 | End: 2024-10-04

## 2024-10-04 RX ADMIN — ARFORMOTEROL TARTRATE 15 MCG: 15 SOLUTION RESPIRATORY (INHALATION) at 07:10

## 2024-10-04 RX ADMIN — ENOXAPARIN SODIUM 40 MG: 40 INJECTION SUBCUTANEOUS at 05:10

## 2024-10-04 RX ADMIN — SIMETHICONE 80 MG: 80 TABLET, CHEWABLE ORAL at 02:10

## 2024-10-04 RX ADMIN — GABAPENTIN 300 MG: 300 CAPSULE ORAL at 08:10

## 2024-10-04 RX ADMIN — METHOCARBAMOL TABLETS 750 MG: 750 TABLET, COATED ORAL at 08:10

## 2024-10-04 RX ADMIN — MAGNESIUM SULFATE HEPTAHYDRATE 2 G: 40 INJECTION, SOLUTION INTRAVENOUS at 08:10

## 2024-10-04 RX ADMIN — BUPROPION HYDROCHLORIDE 150 MG: 150 TABLET, EXTENDED RELEASE ORAL at 08:10

## 2024-10-04 RX ADMIN — BUDESONIDE 0.5 MG: 0.5 INHALANT RESPIRATORY (INHALATION) at 07:10

## 2024-10-04 RX ADMIN — METOPROLOL TARTRATE 25 MG: 25 TABLET, FILM COATED ORAL at 12:10

## 2024-10-04 RX ADMIN — IPRATROPIUM BROMIDE 0.5 MG: 0.5 SOLUTION RESPIRATORY (INHALATION) at 08:10

## 2024-10-04 RX ADMIN — MELOXICAM 7.5 MG: 7.5 TABLET ORAL at 08:10

## 2024-10-04 RX ADMIN — METHOCARBAMOL TABLETS 750 MG: 750 TABLET, COATED ORAL at 10:10

## 2024-10-04 RX ADMIN — METHYLPREDNISOLONE SODIUM SUCCINATE 80 MG: 40 INJECTION, POWDER, FOR SOLUTION INTRAMUSCULAR; INTRAVENOUS at 08:10

## 2024-10-04 RX ADMIN — IPRATROPIUM BROMIDE 0.5 MG: 0.5 SOLUTION RESPIRATORY (INHALATION) at 12:10

## 2024-10-04 RX ADMIN — GABAPENTIN 300 MG: 300 CAPSULE ORAL at 02:10

## 2024-10-04 RX ADMIN — SERTRALINE HYDROCHLORIDE 100 MG: 50 TABLET ORAL at 10:10

## 2024-10-04 RX ADMIN — PANTOPRAZOLE SODIUM 40 MG: 40 TABLET, DELAYED RELEASE ORAL at 08:10

## 2024-10-04 RX ADMIN — SIMETHICONE 80 MG: 80 TABLET, CHEWABLE ORAL at 05:10

## 2024-10-04 RX ADMIN — LOSARTAN POTASSIUM 100 MG: 25 TABLET, FILM COATED ORAL at 08:10

## 2024-10-04 RX ADMIN — GABAPENTIN 300 MG: 300 CAPSULE ORAL at 10:10

## 2024-10-04 RX ADMIN — METOPROLOL TARTRATE 25 MG: 25 TABLET, FILM COATED ORAL at 08:10

## 2024-10-04 RX ADMIN — METOPROLOL TARTRATE 25 MG: 25 TABLET, FILM COATED ORAL at 10:10

## 2024-10-04 RX ADMIN — CETIRIZINE HYDROCHLORIDE 10 MG: 10 TABLET, FILM COATED ORAL at 10:10

## 2024-10-04 RX ADMIN — IPRATROPIUM BROMIDE 0.5 MG: 0.5 SOLUTION RESPIRATORY (INHALATION) at 01:10

## 2024-10-04 RX ADMIN — METHOCARBAMOL TABLETS 750 MG: 750 TABLET, COATED ORAL at 05:10

## 2024-10-04 RX ADMIN — METHOCARBAMOL TABLETS 750 MG: 750 TABLET, COATED ORAL at 12:10

## 2024-10-04 RX ADMIN — INSULIN ASPART 2 UNITS: 100 INJECTION, SOLUTION INTRAVENOUS; SUBCUTANEOUS at 05:10

## 2024-10-04 RX ADMIN — METOPROLOL TARTRATE 25 MG: 25 TABLET, FILM COATED ORAL at 05:10

## 2024-10-04 RX ADMIN — ATORVASTATIN CALCIUM 10 MG: 10 TABLET, FILM COATED ORAL at 08:10

## 2024-10-04 RX ADMIN — IPRATROPIUM BROMIDE 0.5 MG: 0.5 SOLUTION RESPIRATORY (INHALATION) at 07:10

## 2024-10-04 NOTE — PT/OT/SLP EVAL
Physical Therapy Evaluation    Patient Name:  Hannah Montoya   MRN:  [id number]    Recommendations:     Discharge Recommendations: Low Intensity Therapy   Discharge Equipment Recommendations: none   Barriers to discharge: None    Assessment:     Hannah Montoya is a 69 y.o. female admitted with a medical diagnosis of Acute colitis.  She presents with the following impairments/functional limitations: weakness, impaired endurance, impaired self care skills, impaired sensation, impaired functional mobility, gait instability, impaired balance, decreased lower extremity function .    Rehab Prognosis: Good; patient would benefit from acute skilled PT services to address these deficits and reach maximum level of function.    Recent Surgery: Procedure(s) (LRB):  EGD (ESOPHAGOGASTRODUODENOSCOPY) (N/A)  COLONOSCOPY (N/A) 1 Day Post-Op    Plan:     During this hospitalization, patient to be seen 5 x/week to address the identified rehab impairments via gait training, therapeutic activities, therapeutic exercises, neuromuscular re-education and progress toward the following goals:    Plan of Care Expires:       Subjective     Chief Complaint: Upon entry pt states that she needs to use the restroom   Patient/Family Comments/goals: Pt to return home  Pain/Comfort:  Pain Rating 1: 0/10    Patients cultural, spiritual, Episcopal conflicts given the current situation: no    Living Environment:  Lives with son , 0 DENISE   Prior to admission, patients level of function was modified independent and receiving  PT .  Equipment used at home: 3-in-1 commode, walker, rolling, bath bench.  DME owned (not currently used): rolling walker, bedside commode, and shower chair.  Upon discharge, patient will have assistance from family.    Objective:     Communicated with nursing prior to session.  Patient found up in chair with bed alarm, peripheral IV, PureWick  upon PT entry to room.    General Precautions: Standard, fall  Orthopedic  Precautions:N/A   Braces: N/A  Respiratory Status: Room air    Exams:  Cognitive Exam:  Patient is oriented to Person, Place, Time, and Situation  Gross Motor Coordination:  WFL  RLE ROM: WFL  RLE Strength: 3+/5  LLE ROM: WFL  LLE Strength: 3/5    Functional Mobility:  Transfers:     Sit to Stand:  contact guard assistance with rolling walker  Toilet Transfer: contact guard assistance with  rolling walker  using  Step Transfer  Gait: 15 ft 2x's with seated RB in between , declined walking outside of room for today, RW overall CGA to SBA  Balance: Static and Dynamic Sit Good; Static Stand Good without RW , as noted by her standing at sink washing her hands without UE support on RW      AM-PAC 6 CLICK MOBILITY  Total Score:17       Treatment & Education:  Eval only     Patient left up in chair with all lines intact, call button in reach, and urisng notified.    GOALS:   Multidisciplinary Problems       Physical Therapy Goals          Problem: Physical Therapy    Goal Priority Disciplines Outcome Interventions   Physical Therapy Goal     PT, PT/OT Progressing    Description: Goals to be met by: 10/4/2024     Patient will increase functional independence with mobility by performin. Gait  x 150 feet with Contact Guard Assistance using Rolling Walker.                          History:     Past Medical History:   Diagnosis Date    AICD (automatic cardioverter/defibrillator) present     Asthma     bronchitis in past    Breast cancer 2016    right    Cardiac pacemaker     Cardiomyopathy     COPD (chronic obstructive pulmonary disease)     Diabetes mellitus, type 2     Hyperglycemia     Hyperlipidemia     Hypertension     Malignant neoplasm of overlapping sites of female breast 2016    Nuclear sclerosis of both eyes 2020    Respiratory distress 3/12/2020       Past Surgical History:   Procedure Laterality Date    BIOPSY, WITH CT GUIDANCE Right 2023    Procedure: LUNG MASS BIOPSY, WITH CT GUIDANCE;   Surgeon: Yehuda Green MD;  [facility] CATH LAB;  Service: Radiology;  Laterality: Right;    BREAST BIOPSY Right 2016    IDC    BREAST LUMPECTOMY Right     CARDIAC CATHETERIZATION Bilateral 2017    CARDIAC DEFIBRILLATOR PLACEMENT Left 08/10/2017    CARDIAC DEFIBRILLATOR PLACEMENT Left 2018     SECTION      x2    CHOLECYSTECTOMY      COLONOSCOPY N/A 10/3/2024    Procedure: COLONOSCOPY;  Surgeon: Wicho Serna MD;  Location: [facility] ENDO;  Service: Gastroenterology;  Laterality: N/A;    ESOPHAGOGASTRODUODENOSCOPY N/A 10/3/2024    Procedure: EGD (ESOPHAGOGASTRODUODENOSCOPY);  Surgeon: Wicho Serna MD;  Location: [facility] ENDO;  Service: Gastroenterology;  Laterality: N/A;    FEMUR BIOPSY Left 2024    Procedure: BIOPSY, FEMUR;  Surgeon: Porter Campos MD;  Location: [address];  Service: Orthopedics;  Laterality: Left;    INSERTION OF TUNNELED CENTRAL VENOUS CATHETER (CVC) WITH SUBCUTANEOUS PORT Right 2020    Procedure: [id number], RIGHT;  Surgeon: Josefa Caceres MD;  Location: [address];  Service: General;  Laterality: Right;  Port-a-cath placed to R. IJ    INTRAMEDULLARY RODDING OF FEMUR Left 2024    Procedure: INSERTION, INTRAMEDULLARY ANTOINE, FEMUR;  Surgeon: Porter Campos MD;  Location: [address];  Service: Orthopedics;  Laterality: Left;    LUNG BIOPSY N/A 2020    Procedure: BIOPSY, LUNG;  Surgeon: [facility];  Location: [facility];  Service: Radiology;  Laterality: N/A;  8AM START  RN PREOP 2020---COVID NEGATIVE    NAVIGATIONAL BRONCHOSCOPY N/A 10/13/2020    Procedure: BRONCHOSCOPY, NAVIGATIONAL;  Surgeon: Jamilah Weaver MD;  Location: [address];  Service: Pulmonary;  Laterality: N/A;    REVISION OF IMPLANTABLE CARDIOVERTER-DEFIBRILLATOR (ICD) ELECTRODE LEAD PLACEMENT N/A 6/15/2018    Procedure: REVISION-LEAD-ICD;  Surgeon: Javy Gates MD;  Location: [facility] CATH LAB;  Service: Cardiology;   Laterality: N/A;  LBBB, Bi-V ICD HIS Ld rev, MDT, Choice, MB, 3 Prep    ROBOT-ASSISTED LAPAROSCOPIC LYMPHADENECTOMY USING DA SALVADOR XI Right 10/23/2020    Procedure: XI ROBOTIC LYMPHADENECTOMY;  Surgeon: Ramo Tucker MD;  Location: [facility] OR [address] [place] [address];  Service: Thoracic;  Laterality: Right;    TUBAL LIGATION         Time Tracking:     PT Received On:    PT Start Time: 1405     PT Stop Time: 1420  PT Total Time (min): 15 min     Billable Minutes: Evaluation 1      10/04/2024

## 2024-10-04 NOTE — PLAN OF CARE
Case Management Assessment     PCP: Emanuel Chavez MD     Pharmacy: [facility] #[id number] - [place] - [address]  [address] [address]  Phone: [phone] Fax: [phone]    [facility] - [place], [place] - [address]  [address] [facility] [address]  Phone: [phone] Fax: [phone]     Patient Arrived From: Pt's home    Existing Help at Home: Pt's son, Abhinav and his girlfriend.     Barriers to Discharge: None at this time    Discharge Plan:    A. Continue HH with [facility]   B. Home with family    CM met with pt at bedside. Pt stated she lives with her son and his girlfriend. Pt stated she has HH services with [facility]. Pt uses a bath bench and pt's family assists her with bathing and toileting.  Pt ambulates with a walker(standard and rolling). Pt stated she does not use her rollator because her doctor recommended she not use it. Pt's family will provide transportation upon discharge. Pt requested help showering before discharge. Pt's family will provide transportation upon discharge. CM will follow up as needed       10/04/24 1136   Discharge Assessment   Assessment Type Discharge Planning Assessment   Confirmed/corrected address, phone number and insurance Yes   Confirmed Demographics Correct on Facesheet   Source of Information patient   When was your last doctors appointment? 09/26/24   Communicated GUERRERO with patient/caregiver No   Reason For Admission Acute colitis   People in Home child(ayad), adult   Facility Arrived From: Pt's home   Do you expect to return to your current living situation? Yes   Do you have help at home or someone to help you manage your care at home? Yes   Who are your caregiver(s) and their phone number(s)? Abhinav Montoya [phone]   Prior to hospitilization cognitive status: Alert/Oriented   Current cognitive status: Alert/Oriented   Walking or Climbing Stairs Difficulty yes   Walking or Climbing Stairs  ambulation difficulty, requires equipment   Mobility Management uses a standard walker. Has but does not use: rollator,rolling walker   Dressing/Bathing Difficulty yes   Dressing/Bathing bathing difficulty, requires equipment;bathing difficulty, assistance 1 person   Dressing/Bathing Management Bath bench, 3 in 1 commode   Equipment Currently Used at Home 3-in-1 commode;walker, rolling;walker, standard;bath bench;rollator   Readmission within 30 days? No   Patient currently being followed by outpatient case management? No   Do you currently have service(s) that help you manage your care at home? Yes   Name and Contact number of agency [facility]   Is the pt/caregiver preference to resume services with current agency Yes   Do you take prescription medications? Yes   Do you have prescription coverage? Yes   Coverage HU[employer] MANAGED MEDICARE - [employer] MEDICARE SELECT PARTNER - CAPITATED   Do you have any problems affording any of your prescribed medications? No   Is the patient taking medications as prescribed? yes   Who is going to help you get home at discharge? Abhinav Montoya [phone],Elizabeth Bob (Daughter)  [phone]   How do you get to doctors appointments? family or friend will provide   Are you on dialysis? No   Do you take coumadin? No   Discharge Plan A Home with family   Discharge Plan B Home with family   DME Needed Upon Discharge  other (see comments)  (TBD)   Discharge Plan discussed with: Patient   Transition of Care Barriers None   SDOH   (None)   Physical Activity   On average, how many days per week do you engage in moderate to strenuous exercise (like a brisk walk)? 0 days   On average, how many minutes do you engage in exercise at this level? 0 min   Financial Resource Strain   How hard is it for you to pay for the very basics like food, housing, medical care, and heating? Not very   Housing Stability   In the last 12 months, was there a time when you were not able to pay the mortgage or  rent on time? N   At any time in the past 12 months, were you homeless or living in a shelter (including now)? N   Transportation Needs   Has the lack of transportation kept you from medical appointments, meetings, work or from getting things needed for daily living? No   Food Insecurity   Within the past 12 months, you worried that your food would run out before you got the money to buy more. Never true   Within the past 12 months, the food you bought just didn't last and you didn't have money to get more. Never true   Stress   Do you feel stress - tense, restless, nervous, or anxious, or unable to sleep at night because your mind is troubled all the time - these days? To some exte   Alcohol Use   Q1: How often do you have a drink containing alcohol? Monthly or l   Q2: How many drinks containing alcohol do you have on a typical day when you are drinking? 1 or 2   Q3: How often do you have six or more drinks on one occasion? Never   Utilities   In the past 12 months has the electric, gas, oil, or water company threatened to shut off services in your home? No   Health Literacy   How often do you need to have someone help you when you read instructions, pamphlets, or other written material from your doctor or pharmacy? Never   OTHER   Name(s) of People in Home Abhinav Montoya [phone] and pt's son's girlfriend

## 2024-10-04 NOTE — PLAN OF CARE
Problem: Occupational Therapy  Goal: Occupational Therapy Goal  Description: Goals to be met by: 10/18/2024     Patient will increase functional independence with ADLs by performing:    UE Dressing with [place].  LE Dressing with Modified [place].  Toileting from toilet with Modified [place] for hygiene and clothing management.   Step transfer with Modified [place]  Toilet transfer to toilet with Modified [place].    Outcome: Progressing     OT initial eval completed. Pt would benefit from skilled acute OT to maximize function prior to d/c. Recommend return to LIT; no DME needs.

## 2024-10-04 NOTE — CONSULTS
Thank you for your consult to [facility]. We have reviewed the patient chart. This patient does meet criteria for [facility] service at this time.  Will assume care on 10/05/24 at 7AM.

## 2024-10-04 NOTE — SUBJECTIVE & OBJECTIVE
Interval History: improved abdominal pain, no diarrhea. Tolerating regular diet now. Denies respiratory symptoms. No history of TB    Review of Systems   Constitutional:  Negative for chills and fever.   Eyes:  Negative for photophobia and visual disturbance.   Respiratory:  Negative for chest tightness and shortness of breath.    Cardiovascular:  Negative for chest pain and palpitations.   Gastrointestinal:  Negative for abdominal pain, nausea and vomiting.   Genitourinary:  Negative for dysuria and hematuria.     Objective:     Vital Signs (Most Recent):  Temp: 97.8 °F (36.6 °C) (10/04/24 0701)  Pulse: 104 (10/04/24 0803)  Resp: 18 (10/04/24 0803)  BP: (!) 151/67 (10/04/24 0701)  SpO2: 99 % (10/04/24 0803) Vital Signs (24h Range):  Temp:  [97.8 °F (36.6 °C)-98.7 °F (37.1 °C)] 97.8 °F (36.6 °C)  Pulse:  [] 104  Resp:  [18-21] 18  SpO2:  [91 %-99 %] 99 %  BP: (125-158)/(55-76) 151/67     Weight: 87.6 kg (193 lb 2 oz)  Body mass index is 35.32 kg/m².    Intake/Output Summary (Last 24 hours) at 10/4/2024 0910  Last data filed at 10/4/2024 0430  Gross per 24 hour   Intake 962 ml   Output 500 ml   Net 462 ml         Physical Exam  Vitals and nursing note reviewed.   Constitutional:       General: She is not in acute distress.     Appearance: She is well-developed.   HENT:      Head: Normocephalic and atraumatic.   Eyes:      General:         Right eye: No discharge.         Left eye: No discharge.      Conjunctiva/sclera: Conjunctivae normal.   Neck:      Thyroid: No thyromegaly.   Cardiovascular:      Rate and Rhythm: Normal rate and regular rhythm.      Heart sounds: No murmur heard.  Pulmonary:      Effort: Pulmonary effort is normal. No respiratory distress.      Breath sounds: Normal breath sounds.   Abdominal:      General: Bowel sounds are normal. There is no distension.      Palpations: Abdomen is soft. There is no mass.      Tenderness: There is no abdominal tenderness.   Musculoskeletal:         General:  No deformity.      Cervical back: Normal range of motion.      Right lower leg: No edema.      Left lower leg: No edema.   Skin:     General: Skin is warm and dry.   Neurological:      Mental Status: She is alert and oriented to person, place, and time.      Sensory: No sensory deficit.   Psychiatric:         Mood and Affect: Mood normal.         Behavior: Behavior normal.             Significant Labs: CBC:   Recent Labs   Lab 10/03/24  0517 10/04/24  0519   WBC 5.84 3.94   HGB 9.3* 7.9*   HCT 31.1* 27.5*    290     CMP:   Recent Labs   Lab 10/03/24  0516 10/04/24  0519   * 134*   K 3.8 3.7    103   CO2 20* 21*   * 113*   BUN 11 9   CREATININE 0.6 0.6   CALCIUM 8.5* 8.5*   PROT 5.6* 5.3*   ALBUMIN 1.9* 1.8*   BILITOT 0.2 0.2   ALKPHOS 87 72   AST 15 10   ALT 10 8*   ANIONGAP 10 10       Significant Imaging:   Imaging Results              CT Abdomen Pelvis With IV Contrast Routine Oral Contrast (Final result)  Result time 10/01/24 20:24:42      Final result by Dwaine Clay MD (10/01/24 20:24:42)                   Impression:      1. No acute intra-abdominal abnormalities identified.  2. Persistent right lower lobe collapse and consolidation with small right pleural effusion.  3. Postsurgical changes and additional findings as detailed above.      Electronically signed by: Dwaine Clay MD  Date:    10/01/2024  Time:    20:24               Narrative:    EXAMINATION:  CT ABDOMEN PELVIS WITH IV CONTRAST    CLINICAL HISTORY:  Abdominal pain, acute, nonlocalized;    TECHNIQUE:  Low dose axial images, sagittal and coronal reformations were obtained from the lung bases to the pubic symphysis following the IV administration of 75 mL of Omnipaque 350 .  Oral contrast was not given.    COMPARISON:  CT chest abdomen pelvis 09/18/2024.    FINDINGS:  Heart is normal in size.  Pacer wires are partially visualized.  There is persistent right lower lobe collapse/consolidation with small right pleural  effusion.  Left lung base is relatively clear.    No significant hepatic abnormalities are identified.  There is no intra-or extrahepatic biliary ductal dilatation.  Gallbladder has been removed.  The stomach, pancreas, and right adrenal gland are unremarkable.  There is mild left adrenal nodular thickening.  Spleen is mildly enlarged measuring 13 cm.    Kidneys enhance normally with no evidence of hydronephrosis.  No abnormalities are seen along the ureteral courses.  Urinary bladder is nondistended.  There is mild lobular configuration of the uterus with suspected small underlying fibroids seen.    Appendix is visualized and is unremarkable.  The visualized loops of small and large bowel show no evidence of obstruction or inflammation.  No free air or free fluid.    Aorta tapers normally.    No acute osseous abnormality identified.  There is lower lumbar facet arthropathy.  Similar appearance of the left hip with associated adjacent subcutaneous soft tissue thickening and stranding.  Postoperative ORIF changes are seen involving the left proximal femur.

## 2024-10-04 NOTE — PT/OT/SLP EVAL
"Occupational Therapy   Evaluation    Name: Hannah Montoya  MRN: [id number]  Admitting Diagnosis: Acute colitis  Recent Surgery: Procedure(s) (LRB):  EGD (ESOPHAGOGASTRODUODENOSCOPY) (N/A)  COLONOSCOPY (N/A) 1 Day Post-Op    Recommendations:     Discharge Recommendations: return to Low Intensity Therapy  Discharge Equipment Recommendations:  none  Barriers to discharge:  None    Assessment:     Hannah Montoya is a 69 y.o. female with a medical diagnosis of Acute colitis.  She presents with The primary encounter diagnosis was Vomiting and diarrhea. Diagnoses of Malignant neoplasm of lung, unspecified laterality, unspecified part of lung, Adverse effect of chemotherapy, initial encounter, Chest pain, Acute colitis, Nausea, and Abdominal pain, unspecified abdominal location were also pertinent to this visit. . Performance deficits affecting function: weakness, impaired endurance, impaired self care skills, impaired functional mobility, impaired balance, decreased lower extremity function.      OT initial eval completed. Pt would benefit from skilled acute OT to maximize function prior to d/c. Recommend return to [facility]; no DME needs.     Rehab Prognosis: Good; patient would benefit from acute skilled OT services to address these deficits and reach maximum level of function.       Plan:     Patient to be seen 3 x/week to address the above listed problems via self-care/home management, therapeutic activities, therapeutic exercises  Plan of Care Expires: 10/18/24  Plan of Care Reviewed with: patient    Subjective     Chief Complaint: feeling weak  Patient/Family Comments/goals: "I'm ready to go home and get stronger. I've been needing help getting out of bed and getting dressed recently."    Occupational Profile:  Living Environment: Lives with her son and his girlfriend [place] [facility]  Previous level of function: assist for dressing, baths (wash-ups at sink) from daughter; Mod I with assist as needed with  RW; " receiving  PT  Roles and Routines: neha boyer  Equipment Used at Home: bedside commode, walker, rolling, rollator, bath bench, other (see comments) (transport chair)  Assistance upon Discharge: family    Pain/Comfort:  Pain Rating 1: 0/10  Pain Addressed 1: Reposition  Pain Rating Post-Intervention 1: 0/10    Objective:     Communicated with: RN prior to session.  Patient found HOB elevated with bed alarm, peripheral IV, PureWick upon OT entry to room.    General Precautions: Standard, fall  Orthopedic Precautions: N/A  Braces: N/A  Respiratory Status: Room air    Occupational Performance:    Bed Mobility:    Patient completed Scooting/Bridging with stand by assistance  Patient completed Supine to Sit with contact guard assistance    Functional Mobility/Transfers:  Patient completed Sit <> Stand Transfer with minimum assistance  with  rolling walker   Patient completed Bed <> Chair Transfer using Step Transfer technique with contact guard assistance with rolling walker  Functional Mobility: Pt performed in room mobility to sink, bed, and chair with CGA and RW; IV pole mgmt by OT.    Activities of Daily Living:  Grooming: supervision standing at sink with RW for face washing, oral care  Upper Body Dressing: minimal assistance to don gown 2/2 line mgmt  Lower Body Dressing: moderate assistance to don socks with assist for LLE    Cognitive/Visual Perceptual:  Cognitive/Psychosocial Skills:     -       Oriented to: Person, Place, Time, and Situation   -       Follows Commands/attention:Follows two-step commands  -       Communication: clear/fluent  -       Memory: No Deficits noted  -       Safety awareness/insight to disability: intact   -       Mood/Affect/Coping skills/emotional control: Cooperative and Pleasant  Visual/Perceptual:      -Intact  acuity      Physical Exam:  Balance:    -       Fair+/Good-  Postural examination/scapula alignment:    -       Rounded shoulders  Dominant hand:    -       R  Upper  Extremity Range of Motion:     -       Right Upper Extremity: WFL  -       Left Upper Extremity: WFL  Upper Extremity Strength:    -       Right Upper Extremity: WFL  -       Left Upper Extremity: WFL   Strength:    -       Right Upper Extremity: WFL  -       Left Upper Extremity: WFL  Fine Motor Coordination:    -       Intact  Left hand, manipulation of objects and Right hand, manipulation of objects  Gross motor coordination:   WFL    AMPAC 6 Click ADL:  AMPAC Total Score: 18    Treatment & Education:  Patient educated on role of OT/ POC development. Occupational profile developed via interview. Patient guided to transition eob for assessment. Initiated ADL / functional mobility training as above with instruction on safe t/f techniques, return to routines. Educated patient on importance of out of bed mobility, movement/repositioning throughout the day, and call button use. Answered questions within scope, and all needs met.     Patient left up in chair with all lines intact, call button in reach, and RN notified    GOALS:   Multidisciplinary Problems       Occupational Therapy Goals          Problem: Occupational Therapy    Goal Priority Disciplines Outcome Interventions   Occupational Therapy Goal     OT, PT/OT Progressing    Description: Goals to be met by: 10/18/2024     Patient will increase functional independence with ADLs by performing:    UE Dressing with [place].  LE Dressing with Modified [place].  Toileting from toilet with Modified [place] for hygiene and clothing management.   Step transfer with Modified [place]  Toilet transfer to toilet with Modified [place].                         History:     Past Medical History:   Diagnosis Date    AICD (automatic cardioverter/defibrillator) present     Asthma     bronchitis in past    Breast cancer 2016    right    Cardiac pacemaker     Cardiomyopathy     COPD (chronic obstructive pulmonary disease)     Diabetes mellitus, type 2      Hyperglycemia     Hyperlipidemia     Hypertension     Malignant neoplasm of overlapping sites of female breast 2016    Nuclear sclerosis of both eyes 2020    Respiratory distress 3/12/2020         Past Surgical History:   Procedure Laterality Date    BIOPSY, WITH CT GUIDANCE Right 2023    Procedure: LUNG MASS BIOPSY, WITH CT GUIDANCE;  Surgeon: Yehuda Green MD;  Location: [facility] CATH LAB;  Service: Radiology;  Laterality: Right;    BREAST BIOPSY Right 2016    IDC    BREAST LUMPECTOMY Right     CARDIAC CATHETERIZATION Bilateral 2017    CARDIAC DEFIBRILLATOR PLACEMENT Left 08/10/2017    CARDIAC DEFIBRILLATOR PLACEMENT Left 2018     SECTION      x2    CHOLECYSTECTOMY      COLONOSCOPY N/A 10/3/2024    Procedure: COLONOSCOPY;  Surgeon: Wicho Serna MD;  Location: [facility] ENDO;  Service: Gastroenterology;  Laterality: N/A;    ESOPHAGOGASTRODUODENOSCOPY N/A 10/3/2024    Procedure: EGD (ESOPHAGOGASTRODUODENOSCOPY);  Surgeon: Wicho Serna MD;  Location: [facility] ENDO;  Service: Gastroenterology;  Laterality: N/A;    FEMUR BIOPSY Left 2024    Procedure: BIOPSY, FEMUR;  Surgeon: Porter Campos MD;  Location: [facility] OR 2ND FLR;  Service: Orthopedics;  Laterality: Left;    INSERTION OF TUNNELED CENTRAL VENOUS CATHETER (CVC) WITH SUBCUTANEOUS PORT Right 2020    Procedure: [id number]-[id number], RIGHT;  Surgeon: Josefa Caceres MD;  Location: [facility] OR 2ND FLR;  Service: General;  Laterality: Right;  Port-a-cath placed to R. IJ    INTRAMEDULLARY RODDING OF FEMUR Left 2024    Procedure: INSERTION, INTRAMEDULLARY ANTOINE, FEMUR;  Surgeon: Porter Campos MD;  Location: [facility] OR 2ND FLR;  Service: Orthopedics;  Laterality: Left;    LUNG BIOPSY N/A 2020    Procedure: BIOPSY, LUNG;  Surgeon: [facility] Provider;  Location: [facility] OR;  Service: Radiology;  Laterality: N/A;  8AM START  RN PREOP 2020---COVID NEGATIVE    NAVIGATIONAL BRONCHOSCOPY N/A  10/13/2020    Procedure: BRONCHOSCOPY, NAVIGATIONAL;  Surgeon: Jamilah Weaver MD;  [facility];  Service: Pulmonary;  Laterality: N/A;    REVISION OF IMPLANTABLE CARDIOVERTER-DEFIBRILLATOR (ICD) ELECTRODE LEAD PLACEMENT N/A 6/15/2018    Procedure: REVISION-LEAD-ICD;  Surgeon: Javy Gates MD;  Location: [facility] CATH LAB;  Service: Cardiology;  Laterality: N/A;  LBBB, Bi-V ICD HIS Elmo jordan, MDT, Choice, MB, 3 Prep    ROBOT-ASSISTED LAPAROSCOPIC LYMPHADENECTOMY USING DA SALVADOR XI Right 10/23/2020    Procedure: XI ROBOTIC LYMPHADENECTOMY;  Surgeon: Ramo Tucker MD;  [facility];  Service: Thoracic;  Laterality: Right;    TUBAL LIGATION         Time Tracking:     OT Date of Treatment: 10/04/24  OT Start Time: 0915  OT Stop Time: 0956  OT Total Time (min): 41 min    Billable Minutes:Evaluation 15  Self Care/Home Management 15  Therapeutic Activity 11    10/4/2024

## 2024-10-04 NOTE — PLAN OF CARE
Problem: Diabetes Comorbidity  Goal: Blood Glucose Level Within Targeted Range  Outcome: Met  Intervention: Monitor and Manage Glycemia  Flowsheets (Taken 10/4/2024 1813)  Glycemic Management:   blood glucose monitored   oral hydration promoted     Problem: Infection  Goal: Absence of Infection Signs and Symptoms  Outcome: Met  Intervention: Prevent or Manage Infection  Flowsheets (Taken 10/4/2024 1813)  Fever Reduction/Comfort Measures:   lightweight bedding   lightweight clothing  Isolation Precautions: precautions maintained     Problem: Skin Injury Risk Increased  Goal: Skin Health and Integrity  Outcome: Met  Intervention: Optimize Skin Protection  Flowsheets (Taken 10/4/2024 1813)  Pressure Reduction Techniques: frequent weight shift encouraged  Pressure Reduction Devices: elbow protectors utilized  Head of Bed (HOB) Positioning: HOB elevated

## 2024-10-04 NOTE — PLAN OF CARE
Discharge Recommendations: Low Intensity to resume HH PT  Problem: Physical Therapy  Goal: Physical Therapy Goal  Description: Goals to be met by: 10/4/2024     Patient will increase functional independence with mobility by performin. Gait  x 150 feet with Contact Guard Assistance using Rolling Walker.     Outcome: Progressing

## 2024-10-04 NOTE — PROGRESS NOTES
[facility] Progress Note    Patient Complaint: diarrhea, n/v, abdominal pain    PCP:   Emanuel Chavez       LOS: 2        Initial History of Present Illness (HPI):  This is a 69 y.o. 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 PMH tage IV NSCLC with pathologic fracture (follows everardo Fabrice), Non-Insulin-dependent DM2 without complications, HTN, HLD, and Depression. Patient complaint of acute onset of persistent diarrhea with associated symtpoms of dizziness, n/v and abdominal cramping that began 7-10 days after receiving opdivo on . Denies hematemesis, BRBPR, melena, or constipation. Denies sick contacts. Reports the upwards of 10x episodes of watery diarrhea daily.     Labs hgb 7.7 from 9.1 on yesterday, phos 2.5, mg 0.9    Interval Hx  Reports total of 3 stools on yesterday. No stools this am. Reports bloating sensation.     Medical History:  has a past medical history of AICD (automatic cardioverter/defibrillator) present, Asthma, Breast cancer (), Cardiac pacemaker, Cardiomyopathy, COPD (chronic obstructive pulmonary disease), Diabetes mellitus, type 2, Hyperglycemia, Hyperlipidemia, Hypertension, Malignant neoplasm of overlapping sites of female breast (2016), Nuclear sclerosis of both eyes (2020), and Respiratory distress (3/12/2020).    Surgical History:  has a past surgical history that includes Cholecystectomy;  section; Tubal ligation; Breast biopsy (Right, 2016); Breast lumpectomy (Right); Revision of implantable cardioverter-defibrillator (ICD) electrode lead placement (N/A, 6/15/2018); Cardiac catheterization (Bilateral, 2017); Cardiac defibrillator placement (Left, 08/10/2017); Cardiac defibrillator placement (Left, 2018); Lung biopsy (N/A, 2020); Navigational bronchoscopy (N/A, 10/13/2020); Robot-assisted laparoscopic lymphadenectomy using da Temi Xi (Right, 10/23/2020); Insertion of tunneled central  venous catheter (CVC) with subcutaneous port (Right, 11/11/2020); biopsy, with ct guidance (Right, 1/24/2023); Intramedullary rodding of femur (Left, 8/20/2024); Femur biopsy (Left, 8/20/2024); Esophagogastroduodenoscopy (N/A, 10/3/2024); and Colonoscopy (N/A, 10/3/2024).      Objective Findings:    Vital Signs:  Temp:  [97.8 °F (36.6 °C)-98.7 °F (37.1 °C)]   Pulse:  []   Resp:  [18-21]   BP: (125-158)/(55-74)   SpO2:  [91 %-99 %]   Body mass index is 35.32 kg/m².      Physical Exam  Vitals and nursing note reviewed.   Constitutional:       Appearance: Normal appearance.   HENT:      Head: Normocephalic.   Pulmonary:      Effort: Pulmonary effort is normal.   Abdominal:      General: Bowel sounds are normal.      Palpations: Abdomen is soft.   Skin:     General: Skin is warm and dry.   Neurological:      Mental Status: She is alert and oriented to person, place, and time.   Psychiatric:         Mood and Affect: Mood normal.         Behavior: Behavior normal.         Thought Content: Thought content normal.         Judgment: Judgment normal.               Labs:  Lab Results   Component Value Date    WBC 3.94 10/04/2024    HGB 7.9 (L) 10/04/2024    HCT 27.5 (L) 10/04/2024     10/04/2024    CHOL 128 06/06/2024    TRIG 177 (H) 06/06/2024    HDL 38 (L) 06/06/2024    ALT 8 (L) 10/04/2024    AST 10 10/04/2024     (L) 10/04/2024    K 3.7 10/04/2024     10/04/2024    CREATININE 0.6 10/04/2024    BUN 9 10/04/2024    CO2 21 (L) 10/04/2024    TSH 5.169 (H) 09/30/2024    INR 1.0 08/19/2024    HGBA1C 6.1 (H) 08/19/2024             Imaging: 10/1 CT abdomen pelvis w/IV and oral contrast- No acute intra-abdominal abnormalities identified.   Persistent right lower lobe collapse and consolidation with small right pleural effusion. Postsurgical changes and additional findings as detailed above.      Endoscopy: EGD- Sloughing esophagitis with no bleeding. Biopsied.                          - Normal stomach.                           - Normal examined duodenum. Biopsied.     Colonoscopy- Multiple scattered non-bleeding erosions/superficial ulcers and        surrounding bowel edema was found in the cecum and ascending colon.        Biopsies were taken with a cold forceps for histology.        Multiple scattered non-bleeding erosions were found in the rectum.        Biopsies were taken with a cold forceps for histology.        A 6 mm polyp was found in the ascending colon. The polyp was        sessile. Polypectomy was not attempted due to the presence of        surrounding colitis.              Diarrhea. Abdominal pain. N/v. Checkpoint inhibitor colitis  Plan/ Recommendations:  1.  Reports no stool this am or overnight. Total of 3 stools on yesterday. Started IV steroids 80mg daily on yesterday. Plan to transition to po 80mg steroids tomorrow. Continue steroids and taper down 10mg weekly. Plan to f/u in IBD clinic.     Reports she's been having up to 10 watery stools daily since ~1 week after Opdivo, a checkpoint inhibitor medication. . Abdominal imaging is unremarkable. stool studies negative.  Labs Hep B Surface Ag, Hep B core Ab total, HIV, HCV negative. quantiferon gold- indeterminate, HM aware.       Thank you so much for allowing us to participate in the care of Hannah Montoya . Please contact us if you have any additional questions.    Jazmine Ng NP  Gastroenterology  [facility] [place] - Med Surg

## 2024-10-04 NOTE — PROGRESS NOTES
[facility] Medicine  Progress Note    Patient Name: Hannah Montoya  MRN: [id number]  Patient Class: IP- Inpatient   Admission Date: 10/1/2024  Length of Stay: 2 days  Attending Physician: Sydnee Castro DO  Primary Care Provider: Emanuel Chavez MD        Subjective:     Principal Problem:Acute colitis        HPI:  Hannah Montoya is a 69 y.o. female with  stage IV NSCLC with pathologic fracture (follows w Fabrice), Non-Insulin-dependent DM2 without complications, HTN, HLD, and Depression  who presented to [facility] ED on 10/02/2024 for eval and treatment of nausea and vomiting associated with diffuse abdominal pain.  She started treatment with Opdivo on September 12th and reports feeling well up until a few days prior to presentation: started feeling malaise, nausea, and then diarrhea.  The diarrhea has worsened over the past 2 days such that she has had over 12 episodes in the last 24 hrs.  Described as watery, non-bloody, and foul-smelling.  Daughter reports she is now too weak to get out of bed.  Pt reports having started using Depends, but has soaked through her most recent pad.  There is associated general abdominal cramping.  They deny associated fever, chills, dyspnea, CP, palpitations.  Symptoms are worsened / alleviated by nothing.    ED course notable for the following:  Patient uncomfortable and appears tired.  Afebrile, HDS.  Exam: pt is pale, and has diffuse abdominal tenderness to palpation but no guarding or rebound tenderness.  Labs WBC 6.91 HGB 9.2 .  Na 134 K 4.5 BUN 27 CRT 1.1 albumin 2.3.  CT A/P w con: The visualized loops of small and large bowel show no evidence of obstruction or inflammation.   ED therapy/stabilization measures:  IV fluids and Zofran.  Heme Onc and GI made aware.  They were placed in observation under the care of [facility] for further evaluation and treatment.     Overview/Hospital Course:  Hannah Sierra  Lindsay 69 y.o. female placed in observation for diarrhea.  She presented with profuse watery diarrhea in the setting of chemotherapy use.  She was without evidence of sepsis.  CT scan without acute intra-abdominal abnormalities.  She was seen by GI with plans for colonoscopy.  Underwent EGD and colonoscopy with evidence of erosions requiring biopsies.  Her magnesium and phosphorus remain low secondary to diarrhea and decreased p.o. intake which were both replaced.  Her QuantiFERON gold turned to indeterminate and was checked as a screening test plaque beginning possible infliximab should she have checkpoint induced mediated colitis.  PPD placed for confirmation. She was started on high dose steroids for colonoscopy/EGD findings.    Interval History: improved abdominal pain, no diarrhea. Tolerating regular diet now. Denies respiratory symptoms. No history of TB    Review of Systems   Constitutional:  Negative for chills and fever.   Eyes:  Negative for photophobia and visual disturbance.   Respiratory:  Negative for chest tightness and shortness of breath.    Cardiovascular:  Negative for chest pain and palpitations.   Gastrointestinal:  Negative for abdominal pain, nausea and vomiting.   Genitourinary:  Negative for dysuria and hematuria.     Objective:     Vital Signs (Most Recent):  Temp: 97.8 °F (36.6 °C) (10/04/24 0701)  Pulse: 104 (10/04/24 0803)  Resp: 18 (10/04/24 0803)  BP: (!) 151/67 (10/04/24 0701)  SpO2: 99 % (10/04/24 0803) Vital Signs (24h Range):  Temp:  [97.8 °F (36.6 °C)-98.7 °F (37.1 °C)] 97.8 °F (36.6 °C)  Pulse:  [] 104  Resp:  [18-21] 18  SpO2:  [91 %-99 %] 99 %  BP: (125-158)/(55-76) 151/67     Weight: 87.6 kg (193 lb 2 oz)  Body mass index is 35.32 kg/m².    Intake/Output Summary (Last 24 hours) at 10/4/2024 0910  Last data filed at 10/4/2024 0430  Gross per 24 hour   Intake 962 ml   Output 500 ml   Net 462 ml         Physical Exam  Vitals and nursing note reviewed.   Constitutional:        General: She is not in acute distress.     Appearance: She is well-developed.   HENT:      Head: Normocephalic and atraumatic.   Eyes:      General:         Right eye: No discharge.         Left eye: No discharge.      Conjunctiva/sclera: Conjunctivae normal.   Neck:      Thyroid: No thyromegaly.   Cardiovascular:      Rate and Rhythm: Normal rate and regular rhythm.      Heart sounds: No murmur heard.  Pulmonary:      Effort: Pulmonary effort is normal. No respiratory distress.      Breath sounds: Normal breath sounds.   Abdominal:      General: Bowel sounds are normal. There is no distension.      Palpations: Abdomen is soft. There is no mass.      Tenderness: There is no abdominal tenderness.   Musculoskeletal:         General: No deformity.      Cervical back: Normal range of motion.      Right lower leg: No edema.      Left lower leg: No edema.   Skin:     General: Skin is warm and dry.   Neurological:      Mental Status: She is alert and oriented to person, place, and time.      Sensory: No sensory deficit.   Psychiatric:         Mood and Affect: Mood normal.         Behavior: Behavior normal.             Significant Labs: CBC:   Recent Labs   Lab 10/03/24  0517 10/04/24  0519   WBC 5.84 3.94   HGB 9.3* 7.9*   HCT 31.1* 27.5*    290     CMP:   Recent Labs   Lab 10/03/24  0516 10/04/24  0519   * 134*   K 3.8 3.7    103   CO2 20* 21*   * 113*   BUN 11 9   CREATININE 0.6 0.6   CALCIUM 8.5* 8.5*   PROT 5.6* 5.3*   ALBUMIN 1.9* 1.8*   BILITOT 0.2 0.2   ALKPHOS 87 72   AST 15 10   ALT 10 8*   ANIONGAP 10 10       Significant Imaging:   Imaging Results              CT Abdomen Pelvis With IV Contrast Routine Oral Contrast (Final result)  Result time 10/01/24 20:24:42      Final result by Dwaine Clay MD (10/01/24 20:24:42)                   Impression:      1. No acute intra-abdominal abnormalities identified.  2. Persistent right lower lobe collapse and consolidation with small  right pleural effusion.  3. Postsurgical changes and additional findings as detailed above.      Electronically signed by: Dwaine Clay MD  Date:    10/01/2024  Time:    20:24               Narrative:    EXAMINATION:  CT ABDOMEN PELVIS WITH IV CONTRAST    CLINICAL HISTORY:  Abdominal pain, acute, nonlocalized;    TECHNIQUE:  Low dose axial images, sagittal and coronal reformations were obtained from the lung bases to the pubic symphysis following the IV administration of 75 mL of Omnipaque 350 .  Oral contrast was not given.    COMPARISON:  CT chest abdomen pelvis 09/18/2024.    FINDINGS:  Heart is normal in size.  Pacer wires are partially visualized.  There is persistent right lower lobe collapse/consolidation with small right pleural effusion.  Left lung base is relatively clear.    No significant hepatic abnormalities are identified.  There is no intra-or extrahepatic biliary ductal dilatation.  Gallbladder has been removed.  The stomach, pancreas, and right adrenal gland are unremarkable.  There is mild left adrenal nodular thickening.  Spleen is mildly enlarged measuring 13 cm.    Kidneys enhance normally with no evidence of hydronephrosis.  No abnormalities are seen along the ureteral courses.  Urinary bladder is nondistended.  There is mild lobular configuration of the uterus with suspected small underlying fibroids seen.    Appendix is visualized and is unremarkable.  The visualized loops of small and large bowel show no evidence of obstruction or inflammation.  No free air or free fluid.    Aorta tapers normally.    No acute osseous abnormality identified.  There is lower lumbar facet arthropathy.  Similar appearance of the left hip with associated adjacent subcutaneous soft tissue thickening and stranding.  Postoperative ORIF changes are seen involving the left proximal femur.                                        Assessment/Plan:      * Acute colitis  69F with stage IV lung cancer who on September  12th was given nivolumab 360 (Opdivo, an immune checkpoint inhibitor) now presents with several days of worsening diarrhea, and over 12 reported episodes over the last 24 hours.  Watery and non-bloody.  Associated with diffuse cramping.     D/w GI and Heme Onc: Presentation is concerning for possible ICI colitis.   Plans for colonoscopy with GI    GI and Heme-Onc consulted; greatly appreciate recs and assistance  Stool studies: c diff negative and without other infectious cause  Now tolerating PO and reports resolution of diarrhea/abdominal pain after starting steroids.  Discussed with ID given high risk with lung cancer/chemo use and indeterminate Quantiferon will need to keep inpt pending PPD results on (10/6)    Anemia  Anemia is likely due to chronic disease due to Malignancy. Most recent hemoglobin and hematocrit are listed below.  Recent Labs     09/30/24  1014 10/01/24  0835 10/02/24  0637   HGB 9.2* 9.1* 7.7*   HCT 31.5* 31.1* 26.9*     Plan  - Monitor serial CBC: Daily  - Transfuse PRBC if patient becomes hemodynamically unstable, symptomatic or H/H drops below 7/21.  - Patient has not received any PRBC transfusions to date  - Patient's anemia is currently stable  - hemoglobin today of 7.7. baseline of 7.8 to 9.1 per chart review. Denies witnessed bleeding. Will hold transfusion    Hypomagnesemia  Patient has Abnormal Magnesium: hypomagnesemia. Will continue to monitor electrolytes closely. Will replace the affected electrolytes and repeat labs to be done after interventions completed. The patient's magnesium results have been reviewed and are listed below.  Recent Labs   Lab 10/04/24  0519   MG 1.3*      Improving and now tolerating PO. Will supplement with IV today, but suspect will remain up now that tolerating PO    Lytic bone lesion of left femur s/p IM nail on 8/20/2024  Continue home Oxycodone 5 mg q4h PRN      Moderate episode of recurrent major depressive disorder  Continue home  "Wellbutrin      Class 2 severe obesity due to excess calories with serious comorbidity in adult  Body mass index is 32.46 kg/m². Morbid obesity complicates all aspects of disease management from diagnostic modalities to treatment. Weight loss encouraged and health benefits explained to patient.         NSCLC of right lung  Follows w Dr. Avina and Aide Magaña, [facility], who is following and providing much-appreciated assistance.      NICM (nonischemic cardiomyopathy)  Stable on admission.  Transitioning Toprol 100 to Lopressor 25 QID until she is more stable volume-wise.      Type 2 diabetes mellitus without complication  Patient's FSGs are controlled on current medication regimen.  Last A1c reviewed-   Lab Results   Component Value Date    HGBA1C 6.1 (H) 08/19/2024     Most recent fingerstick glucose reviewed- No results for input(s): "POCTGLUCOSE" in the last 24 hours.  Current correctional scale  Low  Maintain anti-hyperglycemic dose as follows-   Antihyperglycemics (From admission, onward)      Start     Stop Route Frequency Ordered    10/02/24 0226  insulin aspart U-100 pen 0-5 Units         -- SubQ Every 6 hours PRN 10/02/24 0127          Hold Oral hypoglycemics while patient is in the hospital.    Essential hypertension  Chronic, controlled. Latest blood pressure and vitals reviewed-     Temp:  [97.6 °F (36.4 °C)-98.6 °F (37 °C)]   Pulse:  []   Resp:  [16-26]   BP: (118-140)/(62-68)   SpO2:  [93 %-95 %] .   Home meds for hypertension were reviewed and noted below.   Hypertension Medications               amLODIPine (NORVASC) 5 MG tablet TAKE 1 TABLET BY MOUTH EVERY DAY    losartan (COZAAR) 100 MG tablet TAKE 1 TABLET BY MOUTH EVERY DAY    metoprolol succinate (TOPROL-XL) 100 MG 24 hr tablet TAKE 1 TABLET BY MOUTH EVERY DAY            While in the hospital, will manage blood pressure as follows; Adjust home antihypertensive regimen as follows- hold amlodipine and switch Toprol 100 to Lopressor 25 QID until " her volume status is more stable    Will utilize p.r.n. blood pressure medication only if patient's blood pressure greater than 180/110 and she develops symptoms such as worsening chest pain or shortness of breath.      VTE Risk Mitigation (From admission, onward)           Ordered     enoxaparin injection 40 mg  Daily         10/01/24 1934     IP VTE HIGH RISK PATIENT  Once         10/01/24 1934     Place sequential compression device  Until discontinued         10/01/24 1934                    Discharge Planning   GUERRERO:      Code Status: Full Code   Is the patient medically ready for discharge?:     Reason for patient still in hospital (select all that apply): Laboratory test and PT / OT recommendations  Discharge Plan A: Home (with instructions to follow up)          Javi Rodríguez PA-C  Department of Hospital Medicine   [facility]

## 2024-10-05 PROBLEM — T45.1X5A ANTINEOPLASTIC CHEMOTHERAPY INDUCED ANEMIA: Status: RESOLVED | Noted: 2024-10-02 | Resolved: 2024-10-05

## 2024-10-05 PROBLEM — D64.81 ANTINEOPLASTIC CHEMOTHERAPY INDUCED ANEMIA: Status: RESOLVED | Noted: 2024-10-02 | Resolved: 2024-10-05

## 2024-10-05 LAB
ALBUMIN SERPL BCP-MCNC: 1.9 G/DL (ref 3.5–5.2)
ALP SERPL-CCNC: 75 U/L (ref 55–135)
ALT SERPL W/O P-5'-P-CCNC: 7 U/L (ref 10–44)
ANION GAP SERPL CALC-SCNC: 7 MMOL/L (ref 8–16)
AST SERPL-CCNC: 10 U/L (ref 10–40)
BACTERIA STL CULT: NORMAL
BASOPHILS # BLD AUTO: 0.03 K/UL (ref 0–0.2)
BASOPHILS NFR BLD: 0.5 % (ref 0–1.9)
BILIRUB SERPL-MCNC: 0.2 MG/DL (ref 0.1–1)
BUN SERPL-MCNC: 11 MG/DL (ref 8–23)
CALCIUM SERPL-MCNC: 8.5 MG/DL (ref 8.7–10.5)
CHLORIDE SERPL-SCNC: 107 MMOL/L (ref 95–110)
CO2 SERPL-SCNC: 22 MMOL/L (ref 23–29)
CREAT SERPL-MCNC: 0.7 MG/DL (ref 0.5–1.4)
DIFFERENTIAL METHOD BLD: ABNORMAL
EOSINOPHIL # BLD AUTO: 0 K/UL (ref 0–0.5)
EOSINOPHIL NFR BLD: 0.2 % (ref 0–8)
ERYTHROCYTE [DISTWIDTH] IN BLOOD BY AUTOMATED COUNT: 19.3 % (ref 11.5–14.5)
EST. GFR  (NO RACE VARIABLE): >60 ML/MIN/1.73 M^2
GLUCOSE SERPL-MCNC: 132 MG/DL (ref 70–110)
HCT VFR BLD AUTO: 28.4 % (ref 37–48.5)
HGB BLD-MCNC: 8.3 G/DL (ref 12–16)
IMM GRANULOCYTES # BLD AUTO: 0.17 K/UL (ref 0–0.04)
IMM GRANULOCYTES NFR BLD AUTO: 2.6 % (ref 0–0.5)
LYMPHOCYTES # BLD AUTO: 0.4 K/UL (ref 1–4.8)
LYMPHOCYTES NFR BLD: 5.4 % (ref 18–48)
MAGNESIUM SERPL-MCNC: 1.4 MG/DL (ref 1.6–2.6)
MCH RBC QN AUTO: 22.4 PG (ref 27–31)
MCHC RBC AUTO-ENTMCNC: 29.2 G/DL (ref 32–36)
MCV RBC AUTO: 77 FL (ref 82–98)
MONOCYTES # BLD AUTO: 0.7 K/UL (ref 0.3–1)
MONOCYTES NFR BLD: 10.2 % (ref 4–15)
NEUTROPHILS # BLD AUTO: 5.3 K/UL (ref 1.8–7.7)
NEUTROPHILS NFR BLD: 81.1 % (ref 38–73)
NRBC BLD-RTO: 0 /100 WBC
PHOSPHATE SERPL-MCNC: 2.1 MG/DL (ref 2.7–4.5)
PLATELET # BLD AUTO: 322 K/UL (ref 150–450)
PMV BLD AUTO: 10.9 FL (ref 9.2–12.9)
POCT GLUCOSE: 153 MG/DL (ref 70–110)
POCT GLUCOSE: 156 MG/DL (ref 70–110)
POCT GLUCOSE: 231 MG/DL (ref 70–110)
POTASSIUM SERPL-SCNC: 3.2 MMOL/L (ref 3.5–5.1)
PROT SERPL-MCNC: 5.2 G/DL (ref 6–8.4)
RBC # BLD AUTO: 3.7 M/UL (ref 4–5.4)
SODIUM SERPL-SCNC: 136 MMOL/L (ref 136–145)
WBC # BLD AUTO: 6.49 K/UL (ref 3.9–12.7)

## 2024-10-05 PROCEDURE — 63600175 PHARM REV CODE 636 W HCPCS: Mod: HCNC

## 2024-10-05 PROCEDURE — 94640 AIRWAY INHALATION TREATMENT: CPT | Mod: HCNC

## 2024-10-05 PROCEDURE — 25000003 PHARM REV CODE 250: Mod: HCNC | Performed by: HOSPITALIST

## 2024-10-05 PROCEDURE — 97530 THERAPEUTIC ACTIVITIES: CPT | Mod: HCNC,CQ

## 2024-10-05 PROCEDURE — 85025 COMPLETE CBC W/AUTO DIFF WBC: CPT | Mod: HCNC

## 2024-10-05 PROCEDURE — 84100 ASSAY OF PHOSPHORUS: CPT | Mod: HCNC

## 2024-10-05 PROCEDURE — 25000242 PHARM REV CODE 250 ALT 637 W/ HCPCS: Mod: HCNC | Performed by: STUDENT IN AN ORGANIZED HEALTH CARE EDUCATION/TRAINING PROGRAM

## 2024-10-05 PROCEDURE — 25000003 PHARM REV CODE 250: Mod: HCNC | Performed by: NURSE PRACTITIONER

## 2024-10-05 PROCEDURE — 83735 ASSAY OF MAGNESIUM: CPT | Mod: HCNC

## 2024-10-05 PROCEDURE — 97116 GAIT TRAINING THERAPY: CPT | Mod: HCNC,CQ

## 2024-10-05 PROCEDURE — 25000003 PHARM REV CODE 250: Mod: HCNC

## 2024-10-05 PROCEDURE — 80053 COMPREHEN METABOLIC PANEL: CPT | Mod: HCNC

## 2024-10-05 PROCEDURE — 63600175 PHARM REV CODE 636 W HCPCS: Mod: HCNC | Performed by: HOSPITALIST

## 2024-10-05 PROCEDURE — 94761 N-INVAS EAR/PLS OXIMETRY MLT: CPT | Mod: HCNC

## 2024-10-05 PROCEDURE — 99232 SBSQ HOSP IP/OBS MODERATE 35: CPT | Mod: HCNC,,, | Performed by: STUDENT IN AN ORGANIZED HEALTH CARE EDUCATION/TRAINING PROGRAM

## 2024-10-05 PROCEDURE — 36415 COLL VENOUS BLD VENIPUNCTURE: CPT | Mod: HCNC

## 2024-10-05 PROCEDURE — 63600175 PHARM REV CODE 636 W HCPCS: Mod: HCNC | Performed by: NURSE PRACTITIONER

## 2024-10-05 PROCEDURE — 11000001 HC ACUTE MED/SURG PRIVATE ROOM: Mod: HCNC

## 2024-10-05 RX ORDER — POTASSIUM CHLORIDE 20 MEQ/1
40 TABLET, EXTENDED RELEASE ORAL ONCE
Status: COMPLETED | OUTPATIENT
Start: 2024-10-05 | End: 2024-10-05

## 2024-10-05 RX ORDER — MAGNESIUM SULFATE HEPTAHYDRATE 40 MG/ML
2 INJECTION, SOLUTION INTRAVENOUS ONCE
Status: COMPLETED | OUTPATIENT
Start: 2024-10-05 | End: 2024-10-05

## 2024-10-05 RX ADMIN — METHOCARBAMOL TABLETS 750 MG: 750 TABLET, COATED ORAL at 04:10

## 2024-10-05 RX ADMIN — SIMETHICONE 80 MG: 80 TABLET, CHEWABLE ORAL at 01:10

## 2024-10-05 RX ADMIN — METOPROLOL TARTRATE 25 MG: 25 TABLET, FILM COATED ORAL at 01:10

## 2024-10-05 RX ADMIN — CETIRIZINE HYDROCHLORIDE 10 MG: 10 TABLET, FILM COATED ORAL at 09:10

## 2024-10-05 RX ADMIN — SIMETHICONE 80 MG: 80 TABLET, CHEWABLE ORAL at 09:10

## 2024-10-05 RX ADMIN — LOSARTAN POTASSIUM 100 MG: 25 TABLET, FILM COATED ORAL at 09:10

## 2024-10-05 RX ADMIN — METOPROLOL TARTRATE 25 MG: 25 TABLET, FILM COATED ORAL at 09:10

## 2024-10-05 RX ADMIN — BUPROPION HYDROCHLORIDE 150 MG: 150 TABLET, EXTENDED RELEASE ORAL at 09:10

## 2024-10-05 RX ADMIN — IPRATROPIUM BROMIDE 0.5 MG: 0.5 SOLUTION RESPIRATORY (INHALATION) at 07:10

## 2024-10-05 RX ADMIN — ALBUTEROL SULFATE 2.5 MG: 2.5 SOLUTION RESPIRATORY (INHALATION) at 01:10

## 2024-10-05 RX ADMIN — BUDESONIDE 0.5 MG: 0.5 INHALANT RESPIRATORY (INHALATION) at 07:10

## 2024-10-05 RX ADMIN — GABAPENTIN 300 MG: 300 CAPSULE ORAL at 09:10

## 2024-10-05 RX ADMIN — ARFORMOTEROL TARTRATE 15 MCG: 15 SOLUTION RESPIRATORY (INHALATION) at 07:10

## 2024-10-05 RX ADMIN — METHOCARBAMOL TABLETS 750 MG: 750 TABLET, COATED ORAL at 09:10

## 2024-10-05 RX ADMIN — METOPROLOL TARTRATE 25 MG: 25 TABLET, FILM COATED ORAL at 04:10

## 2024-10-05 RX ADMIN — PREDNISONE 80 MG: 20 TABLET ORAL at 09:10

## 2024-10-05 RX ADMIN — MAGNESIUM SULFATE HEPTAHYDRATE 2 G: 40 INJECTION, SOLUTION INTRAVENOUS at 12:10

## 2024-10-05 RX ADMIN — GABAPENTIN 300 MG: 300 CAPSULE ORAL at 03:10

## 2024-10-05 RX ADMIN — PANTOPRAZOLE SODIUM 40 MG: 40 TABLET, DELAYED RELEASE ORAL at 09:10

## 2024-10-05 RX ADMIN — ENOXAPARIN SODIUM 40 MG: 40 INJECTION SUBCUTANEOUS at 04:10

## 2024-10-05 RX ADMIN — POTASSIUM CHLORIDE 40 MEQ: 1500 TABLET, EXTENDED RELEASE ORAL at 12:10

## 2024-10-05 RX ADMIN — METHOCARBAMOL TABLETS 750 MG: 750 TABLET, COATED ORAL at 01:10

## 2024-10-05 RX ADMIN — SERTRALINE HYDROCHLORIDE 100 MG: 50 TABLET ORAL at 09:10

## 2024-10-05 RX ADMIN — INSULIN ASPART 2 UNITS: 100 INJECTION, SOLUTION INTRAVENOUS; SUBCUTANEOUS at 06:10

## 2024-10-05 RX ADMIN — MELOXICAM 7.5 MG: 7.5 TABLET ORAL at 09:10

## 2024-10-05 NOTE — NURSING
[facility]  Nurses Note -- 4 Eyes      10/5/2024       Skin assessed on: Q Shift      [] No Pressure Injuries Present    []Prevention Measures Documented    [] Yes LDA  for Pressure Injury Previously documented     [] Yes New Pressure Injury Discovered   [] LDA for New Pressure Injury Added      Attending RN:  Cori Ellis LPN     Second RN:  TYLER Christianson

## 2024-10-05 NOTE — PROGRESS NOTES
[facility] Progress Note    Patient Name: Hannah Montoya  MRN: [id number]  Admission Date: 10/1/2024  Hospital Length of Stay: 3 days  Code Status: Full Code   Attending Provider: Zeke Canales MD   Consulting Provider: Wicho Serna MD  Primary Care Physician: Emanuel Chavez MD  Principal Problem:Acute colitis    Initial History of Present Illness (HPI):  This is a 69 y.o. 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 PMH tage IV NSCLC with pathologic fracture (follows w Fabrice), Non-Insulin-dependent DM2 without complications, HTN, HLD, and Depression. Patient complaint of acute onset of persistent diarrhea with associated symtpoms of dizziness, n/v and abdominal cramping that began 7-10 days after receiving opdivo on Sept 12th. Denies hematemesis, BRBPR, melena, or constipation. Denies sick contacts. Reports the upwards of 10x episodes of watery diarrhea daily.      Interval Hx  Reports 1 BM this morning.    Prior Endoscopy:    EGD- Sloughing esophagitis with no bleeding. Biopsied.                          - Normal stomach.                          - Normal examined duodenum. Biopsied.      Colonoscopy- Multiple scattered non-bleeding erosions/superficial ulcers and        surrounding bowel edema was found in the cecum and ascending colon.        Biopsies were taken with a cold forceps for histology.        Multiple scattered non-bleeding erosions were found in the rectum.        Biopsies were taken with a cold forceps for histology.        A 6 mm polyp was found in the ascending colon. The polyp was        sessile. Polypectomy was not attempted due to the presence of        surrounding colitis.     Imaging:  Imaging: 10/1 CT abdomen pelvis w/IV and oral contrast- No acute intra-abdominal abnormalities identified.   Persistent right lower lobe collapse and consolidation with small right pleural effusion. Postsurgical changes and additional findings as detailed  above.     Medical History:  has a past medical history of AICD (automatic cardioverter/defibrillator) present, Asthma, Breast cancer (), Cardiac pacemaker, Cardiomyopathy, COPD (chronic obstructive pulmonary disease), Diabetes mellitus, type 2, Hyperglycemia, Hyperlipidemia, Hypertension, Malignant neoplasm of overlapping sites of female breast (2016), Nuclear sclerosis of both eyes (2020), and Respiratory distress (3/12/2020).    Surgical History:  has a past surgical history that includes Cholecystectomy;  section; Tubal ligation; Breast biopsy (Right, 2016); Breast lumpectomy (Right); Revision of implantable cardioverter-defibrillator (ICD) electrode lead placement (N/A, 6/15/2018); Cardiac catheterization (Bilateral, 2017); Cardiac defibrillator placement (Left, 08/10/2017); Cardiac defibrillator placement (Left, 2018); Lung biopsy (N/A, 2020); Navigational bronchoscopy (N/A, 10/13/2020); Robot-assisted laparoscopic lymphadenectomy using da Temi Xi (Right, 10/23/2020); Insertion of tunneled central venous catheter (CVC) with subcutaneous port (Right, 2020); biopsy, with ct guidance (Right, 2023); Intramedullary rodding of femur (Left, 2024); Femur biopsy (Left, 2024); Esophagogastroduodenoscopy (N/A, 10/3/2024); and Colonoscopy (N/A, 10/3/2024).    Family History: family history includes Anxiety disorder in her daughter and son; Cataracts in her father and mother; Clotting disorder in her brother; Kidney disease in her father and mother; Macular degeneration in her maternal grandmother; No Known Problems in her maternal aunt, maternal grandfather, maternal uncle, paternal aunt, paternal grandfather, paternal grandmother, paternal uncle, and sister..     Social History:  reports that she quit smoking about 8 years ago. Her smoking use included cigarettes. She started smoking about 48 years ago. She has a 20 pack-year smoking history. She has been  exposed to tobacco smoke. She has never used smokeless tobacco. She reports current alcohol use of about 1.0 standard drink of alcohol per week. She reports that she does not use drugs.    Current Facility-Administered Medications on File Prior to Encounter   Medication Dose Route Frequency Provider Last Rate Last Admin    fentaNYL injection 25 mcg  25 mcg Intravenous Q5 Min PRKeesha Mirza MD        haloperidol lactate injection 0.5 mg  0.5 mg Intravenous Once PRKeesha Mirza MD        HYDROmorphone injection 0.2 mg  0.2 mg Intravenous Q5 Min PRKeesha Mirza MD        ondansetron injection 4 mg  4 mg Intravenous Once PRKeesha Mirza MD        sodium chloride 0.9% flush 10 mL  10 mL Intravenous PRKeesha Mirza MD         Current Outpatient Medications on File Prior to Encounter   Medication Sig Dispense Refill    ACCU-CHEK ALFRED PLUS TEST STRP Strp USE TO TEST THREE TIMES DAILY 200 strip 3    acetaminophen (TYLENOL) 500 MG tablet Take 2 tablets (1,000 mg total) by mouth every 8 (eight) hours as needed for Pain.      albuterol (ACCUNEB) 1.25 mg/3 mL Nebu use 1 AMPULE (3ml) via NEBULIZER EVERY 6 HOURS AS NEEDED 300 mL 3    albuterol (VENTOLIN HFA) 90 mcg/actuation inhaler Inhale 2 puffs into the lungs every 4 (four) hours as needed for Wheezing or Shortness of Breath. Rescue 18 g 4    amLODIPine (NORVASC) 5 MG tablet TAKE 1 TABLET BY MOUTH EVERY DAY 90 tablet 2    atorvastatin (LIPITOR) 10 MG tablet TAKE 1 TABLET BY MOUTH EVERY DAY 90 tablet 1    benzonatate (TESSALON) 200 MG capsule Take 1 capsule (200 mg total) by mouth 3 (three) times daily as needed for Cough. 30 capsule 1    buPROPion (WELLBUTRIN XL) 150 MG TB24 tablet TAKE 1 TABLET BY MOUTH EVERY DAY 90 tablet 3    cetirizine (ZYRTEC) 10 MG tablet Take 10 mg by mouth every evening.       cholecalciferol, vitamin D3, (VITAMIN D3) 50 mcg (2,000 unit) Tab Take 1 tablet (2,000 Units total) by mouth once daily. 30 tablet 11    gabapentin (NEURONTIN) 300 MG capsule  Take 1 capsule (300 mg total) by mouth 3 (three) times daily. 90 capsule 11    losartan (COZAAR) 100 MG tablet TAKE 1 TABLET BY MOUTH EVERY DAY 90 tablet 3    meloxicam (MOBIC) 7.5 MG tablet Take 1 tablet (7.5 mg total) by mouth once daily. 30 tablet 1    metFORMIN (GLUCOPHAGE) 500 MG tablet TAKE 2 TABLETS BY MOUTH TWICE A DAY WITH MEALS 360 tablet 3    methocarbamoL (ROBAXIN) 750 MG Tab Take 1 tablet (750 mg total) by mouth 4 (four) times daily. for 10 days 40 tablet 0    metoprolol succinate (TOPROL-XL) 100 MG 24 hr tablet TAKE 1 TABLET BY MOUTH EVERY DAY 90 tablet 3    multivitamin (THERAGRAN) per tablet Take 1 tablet by mouth once daily.      nystatin (MYCOSTATIN) powder Apply topically 2 (two) times daily. 60 g 1    ondansetron (ZOFRAN-ODT) 8 MG TbDL Take 1 tablet (8 mg total) by mouth every 8 (eight) hours as needed (nausea/vomiting). Take 1 tablet (8 mg) by mouth every 8 hours as needed for nausea/vomiting. 60 tablet 5    oxyCODONE (ROXICODONE) 5 MG immediate release tablet Take 1 tablet (5 mg total) by mouth every 6 (six) hours as needed for Pain. 28 tablet 0    palonosetron (ALOXI) 0.25 mg/5 mL injection       pantoprazole (PROTONIX) 40 MG tablet TAKE 1 TABLET BY MOUTH EVERY DAY 90 tablet 3    READI-CAT 2 2 % (w/v) suspension       READI-CAT 2 2.1 % (w/v), 2.0 % (w/w) suspension       sertraline (ZOLOFT) 100 MG tablet TAKE 1 TABLET BY MOUTH EVERY DAY IN THE EVENING 90 tablet 2    tiotropium (SPIRIVA WITH HANDIHALER) 18 mcg inhalation capsule INHALE THE CONTENTS OF 1 CAPSULE BY MOUTH EVERY DAY 90 capsule 3    WIXELA INHUB 250-50 mcg/dose diskus inhaler INHALE 1 PUFF INTO THE LUNGS 2 (TWO) TIMES DAILY. CONTROLLER 180 each 3       Review of patient's allergies indicates:   Allergen Reactions    Taxol [paclitaxel]      Hypersensitivity reaction to taxol, symptoms included shortness of breath, nausea, dizziness, flushing     Carboplatin Other (See Comments)     Itching and hives    Adhesive Rash     tegaderm  "burns and blistered skin          Objective Findings:    Vital Signs:  /76 (BP Location: Left arm, Patient Position: Sitting)   Pulse 89   Temp 97.9 °F (36.6 °C) (Oral)   Resp 18   Ht 5' 2" (1.575 m)   Wt 87.6 kg (193 lb 2 oz)   LMP  (LMP Unknown)   SpO2 96%   Breastfeeding No   BMI 35.32 kg/m²   Body mass index is 35.32 kg/m².    Physical Exam:  General Appearance: Well appearing in no acute distress  Head: Normocephalic, without obvious abnormality  Eyes: No scleral icterus    Lungs: Non-labored breathing  Abdomen: Soft, non tender, non distended     Laboratory:    MELD 3.0: 11 at 8/21/2024  3:25 AM  MELD-Na: 6 at 8/21/2024  3:25 AM  Calculated from:  Serum Creatinine: 0.9 mg/dL (Using min of 1 mg/dL) at 8/21/2024  3:25 AM  Serum Sodium: 135 mmol/L at 8/21/2024  3:25 AM  Total Bilirubin: 0.3 mg/dL (Using min of 1 mg/dL) at 8/21/2024  3:25 AM  Serum Albumin: 2.6 g/dL at 8/21/2024  3:25 AM  INR(ratio): 1 at 8/19/2024  7:04 PM  Age at listing (hypothetical): 68 years  Sex: Female at 8/21/2024  3:25 AM      Recent Labs   Lab 10/03/24  0517 10/04/24  0519 10/05/24  0548   HGB 9.3* 7.9* 8.3*       Lab Results   Component Value Date    WBC 6.49 10/05/2024    HGB 8.3 (L) 10/05/2024    HCT 28.4 (L) 10/05/2024    MCV 77 (L) 10/05/2024     10/05/2024       Lab Results   Component Value Date     10/05/2024    K 3.2 (L) 10/05/2024     10/05/2024    CO2 22 (L) 10/05/2024    BUN 11 10/05/2024    CREATININE 0.7 10/05/2024    CALCIUM 8.5 (L) 10/05/2024    ANIONGAP 7 (L) 10/05/2024    ESTGFRAFRICA >60.0 06/30/2022    EGFRNONAA 59.7 (A) 06/30/2022       Lab Results   Component Value Date    ALT 7 (L) 10/05/2024    AST 10 10/05/2024    ALKPHOS 75 10/05/2024    BILITOT 0.2 10/05/2024       Lab Results   Component Value Date    INR 1.0 08/19/2024    INR 1.0 08/19/2024    INR 1.0 11/10/2023       Assessment:  Checkpoint inhibitor colitis       Recommendations:  - Continue prednisone 80 mg daily, plan to " taper down 10 mg weekly. Awaiting pathology results from EGD and colonoscopy to rule out any additional infectious etiology  - Monitor for hyperglycemia.  - Plan for f/u in IBD clinic      Thank you for involving us in the care of Hannah Rigorigo Montoya. Please call with any additional questions, concerns or changes in the patient's clinical status. We will continue to follow.     Wicho Serna MD  [facility]

## 2024-10-05 NOTE — SUBJECTIVE & OBJECTIVE
Follow-up For:  Patient Active Problem List    Diagnosis Date Noted Date Diagnosed    Acute colitis 10/01/2024      Discharge Planning   GUERRERO:    Discharge Plan A: Home with family        Interval History:   Subjective: Hannah Montoya is being followed for Acute colitis. No acute events overnight. Symptoms significantly improved. She denies diarrhea. Last BM was overnight more formed than previous. Tolerating PO and eating 100% of meals. She denies any nausea. She also denies any pain.      Symptoms: Improved. The patient denies shortness of breath, malaise, cough, chest pain, weakness, headache, chest pressure, anorexia, diarrhea and anxiety.     Diet: Regular. Adequate intake. The patient denies nausea and vomiting.    Last Bowel Movement: 10/04/24    Activity Level: Impaired due to weakness    Ambulation: Walker    Pain: The patient Denies pain       Review of Systems   Constitutional:  Negative for chills and fever.   Respiratory:  Negative for cough and shortness of breath.    Cardiovascular:  Negative for chest pain and palpitations.   Gastrointestinal:  Negative for abdominal pain.        Scheduled Meds:   arformoteroL  15 mcg Nebulization BID    atorvastatin  10 mg Oral Daily    budesonide  0.5 mg Nebulization Q12H    buPROPion  150 mg Oral Daily    cetirizine  10 mg Oral QHS    enoxparin  40 mg Subcutaneous Daily    gabapentin  300 mg Oral TID    ipratropium  0.5 mg Nebulization Q6H WAKE    losartan  100 mg Oral Daily    magnesium sulfate IVPB  2 g Intravenous Once    meloxicam  7.5 mg Oral Daily    methocarbamoL  750 mg Oral QID    metoprolol tartrate  25 mg Oral QID    miconazole NITRATE 2 %   Topical (Top) BID    multivitamin  1 tablet Oral Daily    pantoprazole  40 mg Oral Daily    potassium chloride  40 mEq Oral Once    predniSONE  80 mg Oral Daily    sertraline  100 mg Oral QHS    simethicone  1 tablet Oral TID PC     Continuous Infusions:  PRN Meds:.  Current Facility-Administered Medications:      acetaminophen, 1,000 mg, Oral, Q8H PRN    albuterol sulfate, 2.5 mg, Nebulization, Q4H PRN    benzonatate, 200 mg, Oral, TID PRN    dextrose 10%, 12.5 g, Intravenous, PRN    dextrose 10%, 12.5 g, Intravenous, PRN    dextrose 10%, 25 g, Intravenous, PRN    glucagon (human recombinant), 1 mg, Intramuscular, PRN    glucagon (human recombinant), 1 mg, Intramuscular, PRN    glucose, 16 g, Oral, PRN    glucose, 24 g, Oral, PRN    insulin aspart U-100, 0-5 Units, Subcutaneous, Q6H PRN    melatonin, 6 mg, Oral, Nightly PRN    naloxone, 0.02 mg, Intravenous, PRN    oxyCODONE, 5 mg, Oral, Q4H PRN    sodium chloride 0.9%, 10 mL, Intravenous, Q12H PRN      Objective:     Vitals:    10/04/24 1958 10/05/24 0142 10/05/24 0742 10/05/24 0808   BP: (!) 150/73 138/74  139/66   BP Location:  Left arm  Left arm   Patient Position: Lying Lying  Lying   Pulse: 93 90 98 100   Resp: 18 18 18 18   Temp: 97.9 °F (36.6 °C) 97.8 °F (36.6 °C)  98.3 °F (36.8 °C)   TempSrc: Oral Oral  Oral   SpO2: 98% (!) 94% (!) 94% (!) 94%   Weight:       Height:           Patient Vitals for the past 72 hrs (Last 3 readings):   Weight   10/03/24 1642 87.6 kg (193 lb 2 oz)       Intake/Output Summary (Last 24 hours) at 10/5/2024 1042  Last data filed at 10/5/2024 0848  Gross per 24 hour   Intake 720 ml   Output --   Net 720 ml     Net IO Since Admission: 2,132.76 mL [10/05/24 1042]    Physical Exam  Vitals and nursing note reviewed.   Constitutional:       General: She is awake. She is not in acute distress.     Appearance: Normal appearance. She is well-developed and well-groomed. She is not toxic-appearing.   HENT:      Head: Normocephalic and atraumatic.   Cardiovascular:      Rate and Rhythm: Normal rate.   Pulmonary:      Effort: No tachypnea, accessory muscle usage or respiratory distress.   Abdominal:      General: There is no distension.   Neurological:      Mental Status: She is alert and oriented to person, place, and time. Mental status is at  baseline.   Psychiatric:         Mood and Affect: Mood normal.         Behavior: Behavior normal. Behavior is cooperative.         Thought Content: Thought content normal.       Significant Labs: All pertinent labs within the past 24 hours have been reviewed.    BMP (Last 3 Results):   Recent Labs   Lab 10/03/24  0516 10/04/24  0519 10/05/24  0548   * 113* 132*   * 134* 136   K 3.8 3.7 3.2*    103 107   CO2 20* 21* 22*   BUN 11 9 11   CREATININE 0.6 0.6 0.7   CALCIUM 8.5* 8.5* 8.5*   MG 1.0*  1.1* 1.3* 1.4*     CBC (Last 3 Results):   Recent Labs   Lab 10/03/24  0517 10/04/24  0519 10/05/24  0548   WBC 5.84 3.94 6.49   RBC 4.09 3.65* 3.70*   HGB 9.3* 7.9* 8.3*   HCT 31.1* 27.5* 28.4*    290 322   MCV 76* 75* 77*   MCH 22.7* 21.6* 22.4*   MCHC 29.9* 28.7* 29.2*       Significant Imaging: I have reviewed all pertinent imaging results/findings within the past 24 hours.  CT Abdomen Pelvis With IV Contrast Routine Oral Contrast  Narrative: EXAMINATION:  CT ABDOMEN PELVIS WITH IV CONTRAST    CLINICAL HISTORY:  Abdominal pain, acute, nonlocalized;    TECHNIQUE:  Low dose axial images, sagittal and coronal reformations were obtained from the lung bases to the pubic symphysis following the IV administration of 75 mL of Omnipaque 350 .  Oral contrast was not given.    COMPARISON:  CT chest abdomen pelvis 09/18/2024.    FINDINGS:  Heart is normal in size.  Pacer wires are partially visualized.  There is persistent right lower lobe collapse/consolidation with small right pleural effusion.  Left lung base is relatively clear.    No significant hepatic abnormalities are identified.  There is no intra-or extrahepatic biliary ductal dilatation.  Gallbladder has been removed.  The stomach, pancreas, and right adrenal gland are unremarkable.  There is mild left adrenal nodular thickening.  Spleen is mildly enlarged measuring 13 cm.    Kidneys enhance normally with no evidence of hydronephrosis.  No  abnormalities are seen along the ureteral courses.  Urinary bladder is nondistended.  There is mild lobular configuration of the uterus with suspected small underlying fibroids seen.    Appendix is visualized and is unremarkable.  The visualized loops of small and large bowel show no evidence of obstruction or inflammation.  No free air or free fluid.    Aorta tapers normally.    No acute osseous abnormality identified.  There is lower lumbar facet arthropathy.  Similar appearance of the left hip with associated adjacent subcutaneous soft tissue thickening and stranding.  Postoperative ORIF changes are seen involving the left proximal femur.  Impression: 1. No acute intra-abdominal abnormalities identified.  2. Persistent right lower lobe collapse and consolidation with small right pleural effusion.  3. Postsurgical changes and additional findings as detailed above.    Electronically signed by: Dwaine Clay MD  Date:    10/01/2024  Time:    20:24

## 2024-10-05 NOTE — PT/OT/SLP PROGRESS
Physical Therapy Treatment    Patient Name:  Hannah Montoya   MRN:  [id number]    Recommendations:     Discharge Recommendations: Low Intensity Therapy  Discharge Equipment Recommendations: none  Barriers to discharge: None    Assessment:     Hannah Montoya is a 69 y.o. female admitted with a medical diagnosis of Acute colitis.  She presents with the following impairments/functional limitations: weakness, impaired endurance, impaired self care skills, impaired functional mobility, gait instability, decreased lower extremity function, decreased ROM, pain, impaired balance, edema .    Rehab Prognosis: Good; patient would benefit from acute skilled PT services to address these deficits and reach maximum level of function.    Recent Surgery: Procedure(s) (LRB):  EGD (ESOPHAGOGASTRODUODENOSCOPY) (N/A)  COLONOSCOPY (N/A) 2 Days Post-Op    Plan:     During this hospitalization, patient to be seen 5 x/week to address the identified rehab impairments via gait training, therapeutic activities, therapeutic exercises, neuromuscular re-education and progress toward the following goals:    Plan of Care Expires:       Subjective     Chief Complaint: weakness and LLE swollen  Patient/Family Comments/goals: pt is pleasant and agreeable to therapy  Pain/Comfort:  Pain Rating 1: 0/10  Pain Rating Post-Intervention 1: 0/10      Objective:     Communicated with nurse prior to session.  Patient found HOB elevated with bed alarm, peripheral IV, telemetry upon PT entry to room.     General Precautions: Standard, fall  Orthopedic Precautions: N/A  Braces: N/A  Respiratory Status: Room air     Functional Mobility: pt is easily fatigue , required extra time and frequent rest breaks to complete tasks.   Bed Mobility:     Rolling Left:  stand by assistance  Scooting: stand by assistance  Supine to Sit: stand by assistance  Transfers: VC's for safety technique and walker management .    Sit to Stand: from bed , toilet seat and chair   stand by assistance and contact guard assistance with rolling walker  Bed to Chair: stand by assistance and contact guard assistance with  rolling walker  using  Step Transfer  Toilet Transfer: contact guard assistance with  rolling walker  using  Step Transfer  Gait: pt ambulated 10 ft , 20 ft and 25 ft x 2 trials with RW, SBA/CGA (chair followed). Pt required seated rest breaks 2* to fatigue. Noted with decreased devon,decreased step length, decreased weight shifting ability, no LOB. VC's for safety technique and walker management.    Balance:  good in siting, fair+ in standing with RW       AM-PAC 6 CLICK MOBILITY  Turning over in bed (including adjusting bedclothes, sheets and blankets)?: 4  Sitting down on and standing up from a chair with arms (e.g., wheelchair, bedside commode, etc.): 3  Moving from lying on back to sitting on the side of the bed?: 3  Moving to and from a bed to a chair (including a wheelchair)?: 3  Need to walk in hospital room?: 3  Climbing 3-5 steps with a railing?: 3  Basic Mobility Total Score: 19       Treatment & Education:  Pt performed standing balance at the sink for hand hygiene with SUKUMAR MARESA .   Instructed pt to perform seated BLE x 10 reps : AP, LAQ ,HSC .   Progressive Mobility Level 3: Recommend patient be assisted out of bed to chair, toilet, and/or bedside commode with </= CGA      Patient left up in chair on air cushion, BLE elevated ,heels offloading  with all lines intact, call button in reach, nurse notified, and daughter  present..    GOALS:   Multidisciplinary Problems       Physical Therapy Goals          Problem: Physical Therapy    Goal Priority Disciplines Outcome Interventions   Physical Therapy Goal     PT, PT/OT Progressing    Description: Goals to be met by: 10/4/2024     Patient will increase functional independence with mobility by performin. Gait  x 150 feet with Contact Guard Assistance using Rolling Walker.                          Time Tracking:      PT Received On: 10/05/24  PT Start Time: 1228     PT Stop Time: 1310  PT Total Time (min): 42 min     Billable Minutes: Gait Training 24 and Therapeutic Activity 18    Treatment Type: Treatment  PT/PTA: PTA     Number of PTA visits since last PT visit: 1     10/05/2024

## 2024-10-05 NOTE — PROGRESS NOTES
[facility] Medicine  Telemedicine Progress Note    Patient Name: Hannah Montoya  MRN: [id number]  Patient Class: IP- Inpatient   Admission Date: 10/1/2024  Length of Stay: 3 days  Attending Physician: Zeke Canales MD  Primary Care Provider: Emanuel Chavez MD          Subjective:     Principal Problem:Acute colitis        HPI:  Hannah Montoya is a 69 y.o. female with  stage IV NSCLC with pathologic fracture (follows w Fabrice), Non-Insulin-dependent DM2 without complications, HTN, HLD, and Depression  who presented to [facility] ED on 10/02/2024 for eval and treatment of nausea and vomiting associated with diffuse abdominal pain.  She started treatment with Opdivo on September 12th and reports feeling well up until a few days prior to presentation: started feeling malaise, nausea, and then diarrhea.  The diarrhea has worsened over the past 2 days such that she has had over 12 episodes in the last 24 hrs.  Described as watery, non-bloody, and foul-smelling.  Daughter reports she is now too weak to get out of bed.  Pt reports having started using Depends, but has soaked through her most recent pad.  There is associated general abdominal cramping.  They deny associated fever, chills, dyspnea, CP, palpitations.  Symptoms are worsened / alleviated by nothing.    ED course notable for the following:  Patient uncomfortable and appears tired.  Afebrile, HDS.  Exam: pt is pale, and has diffuse abdominal tenderness to palpation but no guarding or rebound tenderness.  Labs WBC 6.91 HGB 9.2 .  Na 134 K 4.5 BUN 27 CRT 1.1 albumin 2.3.  CT A/P w con: The visualized loops of small and large bowel show no evidence of obstruction or inflammation.   ED therapy/stabilization measures:  IV fluids and Zofran.  Heme Onc and GI made aware.  They were placed in observation under the care of [facility] for further evaluation and treatment.     Overview/Hospital  Course:  Hannah Montoya 69 y.o. female placed in observation for diarrhea.  She presented with profuse watery diarrhea in the setting of chemotherapy use.  She was without evidence of sepsis.  CT scan without acute intra-abdominal abnormalities.  She was seen by GI with plans for colonoscopy.  Underwent EGD and colonoscopy with evidence of erosions requiring biopsies.  Her magnesium and phosphorus remain low secondary to diarrhea and decreased p.o. intake which were both replaced.  Her QuantiFERON gold turned to indeterminate and was checked as a screening test plaque beginning possible infliximab should she have checkpoint induced mediated colitis.  PPD placed for confirmation. She was started on high dose steroids for colonoscopy/EGD findings.    Follow-up For:  Patient Active Problem List    Diagnosis Date Noted Date Diagnosed    Acute colitis 10/01/2024      Discharge Planning   GUERRERO:    Discharge Plan A: Home with family        Interval History:   Subjective: Hannah Montoya is being followed for Acute colitis. No acute events overnight. Symptoms significantly improved. She denies diarrhea. Last BM was overnight more formed than previous. Tolerating PO and eating 100% of meals. She denies any nausea. She also denies any pain.      Symptoms: Improved. The patient denies shortness of breath, malaise, cough, chest pain, weakness, headache, chest pressure, anorexia, diarrhea and anxiety.     Diet: Regular. Adequate intake. The patient denies nausea and vomiting.    Last Bowel Movement: 10/04/24    Activity Level: Impaired due to weakness    Ambulation: Walker    Pain: The patient Denies pain       Review of Systems   Constitutional:  Negative for chills and fever.   Respiratory:  Negative for cough and shortness of breath.    Cardiovascular:  Negative for chest pain and palpitations.   Gastrointestinal:  Negative for abdominal pain.        Scheduled Meds:   arformoteroL  15 mcg Nebulization BID     atorvastatin  10 mg Oral Daily    budesonide  0.5 mg Nebulization Q12H    buPROPion  150 mg Oral Daily    cetirizine  10 mg Oral QHS    enoxparin  40 mg Subcutaneous Daily    gabapentin  300 mg Oral TID    ipratropium  0.5 mg Nebulization Q6H WAKE    losartan  100 mg Oral Daily    magnesium sulfate IVPB  2 g Intravenous Once    meloxicam  7.5 mg Oral Daily    methocarbamoL  750 mg Oral QID    metoprolol tartrate  25 mg Oral QID    miconazole NITRATE 2 %   Topical (Top) BID    multivitamin  1 tablet Oral Daily    pantoprazole  40 mg Oral Daily    potassium chloride  40 mEq Oral Once    predniSONE  80 mg Oral Daily    sertraline  100 mg Oral QHS    simethicone  1 tablet Oral TID PC     Continuous Infusions:  PRN Meds:.  Current Facility-Administered Medications:     acetaminophen, 1,000 mg, Oral, Q8H PRN    albuterol sulfate, 2.5 mg, Nebulization, Q4H PRN    benzonatate, 200 mg, Oral, TID PRN    dextrose 10%, 12.5 g, Intravenous, PRN    dextrose 10%, 12.5 g, Intravenous, PRN    dextrose 10%, 25 g, Intravenous, PRN    glucagon (human recombinant), 1 mg, Intramuscular, PRN    glucagon (human recombinant), 1 mg, Intramuscular, PRN    glucose, 16 g, Oral, PRN    glucose, 24 g, Oral, PRN    insulin aspart U-100, 0-5 Units, Subcutaneous, Q6H PRN    melatonin, 6 mg, Oral, Nightly PRN    naloxone, 0.02 mg, Intravenous, PRN    oxyCODONE, 5 mg, Oral, Q4H PRN    sodium chloride 0.9%, 10 mL, Intravenous, Q12H PRN      Objective:     Vitals:    10/04/24 1958 10/05/24 0142 10/05/24 0742 10/05/24 0808   BP: (!) 150/73 138/74  139/66   BP Location:  Left arm  Left arm   Patient Position: Lying Lying  Lying   Pulse: 93 90 98 100   Resp: 18 18 18 18   Temp: 97.9 °F (36.6 °C) 97.8 °F (36.6 °C)  98.3 °F (36.8 °C)   TempSrc: Oral Oral  Oral   SpO2: 98% (!) 94% (!) 94% (!) 94%   Weight:       Height:           Patient Vitals for the past 72 hrs (Last 3 readings):   Weight   10/03/24 1642 87.6 kg (193 lb 2 oz)       Intake/Output Summary  (Last 24 hours) at 10/5/2024 1042  Last data filed at 10/5/2024 0848  Gross per 24 hour   Intake 720 ml   Output --   Net 720 ml     Net IO Since Admission: 2,132.76 mL [10/05/24 1042]    Physical Exam  Vitals and nursing note reviewed.   Constitutional:       General: She is awake. She is not in acute distress.     Appearance: Normal appearance. She is well-developed and well-groomed. She is not toxic-appearing.   HENT:      Head: Normocephalic and atraumatic.   Cardiovascular:      Rate and Rhythm: Normal rate.   Pulmonary:      Effort: No tachypnea, accessory muscle usage or respiratory distress.   Abdominal:      General: There is no distension.   Neurological:      Mental Status: She is alert and oriented to person, place, and time. Mental status is at baseline.   Psychiatric:         Mood and Affect: Mood normal.         Behavior: Behavior normal. Behavior is cooperative.         Thought Content: Thought content normal.       Significant Labs: All pertinent labs within the past 24 hours have been reviewed.    BMP (Last 3 Results):   Recent Labs   Lab 10/03/24  0516 10/04/24  0519 10/05/24  0548   * 113* 132*   * 134* 136   K 3.8 3.7 3.2*    103 107   CO2 20* 21* 22*   BUN 11 9 11   CREATININE 0.6 0.6 0.7   CALCIUM 8.5* 8.5* 8.5*   MG 1.0*  1.1* 1.3* 1.4*     CBC (Last 3 Results):   Recent Labs   Lab 10/03/24  0517 10/04/24  0519 10/05/24  0548   WBC 5.84 3.94 6.49   RBC 4.09 3.65* 3.70*   HGB 9.3* 7.9* 8.3*   HCT 31.1* 27.5* 28.4*    290 322   MCV 76* 75* 77*   MCH 22.7* 21.6* 22.4*   MCHC 29.9* 28.7* 29.2*       Significant Imaging: I have reviewed all pertinent imaging results/findings within the past 24 hours.  CT Abdomen Pelvis With IV Contrast Routine Oral Contrast  Narrative: EXAMINATION:  CT ABDOMEN PELVIS WITH IV CONTRAST    CLINICAL HISTORY:  Abdominal pain, acute, nonlocalized;    TECHNIQUE:  Low dose axial images, sagittal and coronal reformations were obtained from the  lung bases to the pubic symphysis following the IV administration of 75 mL of Omnipaque 350 .  Oral contrast was not given.    COMPARISON:  CT chest abdomen pelvis 09/18/2024.    FINDINGS:  Heart is normal in size.  Pacer wires are partially visualized.  There is persistent right lower lobe collapse/consolidation with small right pleural effusion.  Left lung base is relatively clear.    No significant hepatic abnormalities are identified.  There is no intra-or extrahepatic biliary ductal dilatation.  Gallbladder has been removed.  The stomach, pancreas, and right adrenal gland are unremarkable.  There is mild left adrenal nodular thickening.  Spleen is mildly enlarged measuring 13 cm.    Kidneys enhance normally with no evidence of hydronephrosis.  No abnormalities are seen along the ureteral courses.  Urinary bladder is nondistended.  There is mild lobular configuration of the uterus with suspected small underlying fibroids seen.    Appendix is visualized and is unremarkable.  The visualized loops of small and large bowel show no evidence of obstruction or inflammation.  No free air or free fluid.    Aorta tapers normally.    No acute osseous abnormality identified.  There is lower lumbar facet arthropathy.  Similar appearance of the left hip with associated adjacent subcutaneous soft tissue thickening and stranding.  Postoperative ORIF changes are seen involving the left proximal femur.  Impression: 1. No acute intra-abdominal abnormalities identified.  2. Persistent right lower lobe collapse and consolidation with small right pleural effusion.  3. Postsurgical changes and additional findings as detailed above.    Electronically signed by: Dwaine Clay MD  Date:    10/01/2024  Time:    20:24        Assessment/Plan:      * Acute colitis  69F with stage IV lung cancer who on September 12th was given nivolumab 360 (Opdivo, an immune checkpoint inhibitor) now presents with several days of worsening diarrhea, and  over 12 reported episodes over the last 24 hours.  Watery and non-bloody.  Associated with diffuse cramping.     CT abdomen: No acute intra-abdominal abnormalities identified.   Endoscopy: Sloughing esophagitis with no bleeding. Normal stomach. Normal duodenum.   Colonoscopy- Multiple scattered non-bleeding erosions/superficial ulcers and surrounding bowel edema was found in the cecum and ascending colon. Multiple scattered non-bleeding erosions were found in the rectum.   Stool studies: c diff negative and without other infectious cause    D/w GI and Heme Onc: Presentation is concerning for possible ICI colitis.   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  Discussed with ID given high risk with lung cancer/chemo use and indeterminate Quantiferon will need to keep inpt pending PPD results on (10/6)    Diarrhea  Plan as above.       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 Most recent hemoglobin and hematocrit are listed below.  Recent Labs     10/03/24  0517 10/04/24  0519 10/05/24  0548   HGB 9.3* 7.9* 8.3*   HCT 31.1* 27.5* 28.4*       Plan  - Monitor serial CBC: Daily  - Transfuse PRBC if patient becomes hemodynamically unstable, symptomatic or H/H drops below 7/21.  - Patient has not received any PRBC transfusions to date  - Patient's anemia is currently stable    Hypomagnesemia  Patient has Abnormal Magnesium: hypomagnesemia. Will continue to monitor electrolytes closely. Will replace the affected electrolytes and repeat labs to be done after interventions completed. The patient's magnesium results have been reviewed and are listed below.  Recent Labs   Lab  10/05/24  0548   MG 1.4*     Replace and repeat    Lytic bone lesion of left femur s/p IM nail on 8/20/2024  Continue home Oxycodone 5 mg q4h PRN      Moderate episode of recurrent major depressive disorder  Continue home Wellbutrin      Class 2 severe obesity due to excess calories with serious comorbidity in adult  Body mass index is 35.32 kg/m². Morbid obesity complicates all aspects of disease management from diagnostic modalities to treatment. Weight loss encouraged and health benefits explained to patient.         NSCLC of right lung  Follows w Dr. Avina and Aide Magaña, [facility], who is following and providing much-appreciated assistance.      NICM (nonischemic cardiomyopathy)  Stable   Cont BB  Cont to monitor I/O's and daily weights.  Fluid restriction (2 liters/24 hours)  Low Na diet  Monitor for signs of fluid overload: RR>30, O2 sat<92%, weight gain of >3 lbs, or urinary output <160ml/8hr  Maintain oxygen sats >92% via NC if supplemental oxygen needed.     Patient Vitals for the past 72 hrs (Last 3 readings):   Weight   10/03/24 1642 87.6 kg (193 lb 2 oz)     Diuretics (From admission, onward)      None          Cardiac/Autonomic (From admission, onward)      Start     Stop Route Frequency Ordered    10/02/24 0900  metoprolol tartrate (LOPRESSOR) tablet 25 mg         -- Oral 4 times daily 10/02/24 0107    10/02/24 0900  losartan tablet 100 mg         -- Oral Daily 10/02/24 0107    10/02/24 0900  atorvastatin tablet 10 mg         -- Oral Daily 10/02/24 0107            Type 2 diabetes mellitus without complication  Patient's FSGs are controlled on current medication regimen.  Last A1c reviewed-   Lab Results   Component Value Date    HGBA1C 6.1 (H) 08/19/2024     Most recent fingerstick glucose reviewed-   Recent Labs   Lab 10/04/24  2002   POCTGLUCOSE 144*     Current correctional scale  Low  Maintain anti-hyperglycemic dose as follows-   Antihyperglycemics (From admission, onward)      Start     Stop Route  Frequency Ordered    10/02/24 0226  insulin aspart U-100 pen 0-5 Units         -- SubQ Every 6 hours PRN 10/02/24 0127          Hold Oral hypoglycemics while patient is in the hospital.    Essential hypertension  BP Readings from Last 3 Encounters:   10/05/24 139/66   09/23/24 (!) 142/67   09/13/24 (!) 152/60     Continuing home medications as prescribed  Will cont to monitor and adjust as needed  Will utilize p.r.n. blood pressure medication only if patient's blood pressure greater than 180/110 and she develops symptoms such as worsening chest pain or shortness of breath.  Cardiac diet    Hypertension Medications               amLODIPine (NORVASC) 5 MG tablet TAKE 1 TABLET BY MOUTH EVERY DAY    losartan (COZAAR) 100 MG tablet TAKE 1 TABLET BY MOUTH EVERY DAY    metoprolol succinate (TOPROL-XL) 100 MG 24 hr tablet TAKE 1 TABLET BY MOUTH EVERY DAY              VTE Risk Mitigation (From admission, onward)           Ordered     enoxaparin injection 40 mg  Daily         10/01/24 1934     IP VTE HIGH RISK PATIENT  Once         10/01/24 1934     Place sequential compression device  Until discontinued         10/01/24 1934                          I have completed this tele-visit without the assistance of a telepresenter.    The attending portion of this evaluation, treatment, and documentation was performed per Zeke Jarrell MD via Telemedicine AudioVisual using the secure [employer] software platform with 2 way audio/video. The provider was located off-site and the patient is located in the hospital. The aforementioned video software was utilized to document the relevant history and physical exam    Zeke Jarrell MD  Department of Hospital Medicine   [facility]

## 2024-10-06 ENCOUNTER — PATIENT MESSAGE (OUTPATIENT)
Dept: ELECTROPHYSIOLOGY | Facility: CLINIC | Age: 69
End: 2024-10-06
Payer: MEDICARE

## 2024-10-06 LAB
ALBUMIN SERPL BCP-MCNC: 2 G/DL (ref 3.5–5.2)
ALP SERPL-CCNC: 76 U/L (ref 55–135)
ALT SERPL W/O P-5'-P-CCNC: 9 U/L (ref 10–44)
ANION GAP SERPL CALC-SCNC: 8 MMOL/L (ref 8–16)
AST SERPL-CCNC: 10 U/L (ref 10–40)
BASOPHILS # BLD AUTO: 0.03 K/UL (ref 0–0.2)
BASOPHILS NFR BLD: 0.5 % (ref 0–1.9)
BILIRUB SERPL-MCNC: 0.2 MG/DL (ref 0.1–1)
BUN SERPL-MCNC: 10 MG/DL (ref 8–23)
CALCIUM SERPL-MCNC: 8.6 MG/DL (ref 8.7–10.5)
CHLORIDE SERPL-SCNC: 112 MMOL/L (ref 95–110)
CO2 SERPL-SCNC: 19 MMOL/L (ref 23–29)
CREAT SERPL-MCNC: 0.7 MG/DL (ref 0.5–1.4)
DIFFERENTIAL METHOD BLD: ABNORMAL
EOSINOPHIL # BLD AUTO: 0 K/UL (ref 0–0.5)
EOSINOPHIL NFR BLD: 0.2 % (ref 0–8)
ERYTHROCYTE [DISTWIDTH] IN BLOOD BY AUTOMATED COUNT: 19.5 % (ref 11.5–14.5)
EST. GFR  (NO RACE VARIABLE): >60 ML/MIN/1.73 M^2
FAT STL QL: NORMAL
GLUCOSE SERPL-MCNC: 107 MG/DL (ref 70–110)
HCT VFR BLD AUTO: 29 % (ref 37–48.5)
HGB BLD-MCNC: 8.2 G/DL (ref 12–16)
IMM GRANULOCYTES # BLD AUTO: 0.15 K/UL (ref 0–0.04)
IMM GRANULOCYTES NFR BLD AUTO: 2.6 % (ref 0–0.5)
LACTOFERRIN STL QL IA: POSITIVE
LYMPHOCYTES # BLD AUTO: 0.5 K/UL (ref 1–4.8)
LYMPHOCYTES NFR BLD: 7.8 % (ref 18–48)
MAGNESIUM SERPL-MCNC: 1.6 MG/DL (ref 1.6–2.6)
MCH RBC QN AUTO: 21.9 PG (ref 27–31)
MCHC RBC AUTO-ENTMCNC: 28.3 G/DL (ref 32–36)
MCV RBC AUTO: 77 FL (ref 82–98)
MONOCYTES # BLD AUTO: 0.6 K/UL (ref 0.3–1)
MONOCYTES NFR BLD: 10.2 % (ref 4–15)
NEUTRAL FAT STL QL: NORMAL
NEUTROPHILS # BLD AUTO: 4.6 K/UL (ref 1.8–7.7)
NEUTROPHILS NFR BLD: 78.7 % (ref 38–73)
NRBC BLD-RTO: 0 /100 WBC
PHOSPHATE SERPL-MCNC: 2.4 MG/DL (ref 2.7–4.5)
PLATELET # BLD AUTO: 310 K/UL (ref 150–450)
PMV BLD AUTO: 10.4 FL (ref 9.2–12.9)
POCT GLUCOSE: 155 MG/DL (ref 70–110)
POCT GLUCOSE: 161 MG/DL (ref 70–110)
POCT GLUCOSE: 210 MG/DL (ref 70–110)
POTASSIUM SERPL-SCNC: 3.3 MMOL/L (ref 3.5–5.1)
PROT SERPL-MCNC: 5.2 G/DL (ref 6–8.4)
RBC # BLD AUTO: 3.75 M/UL (ref 4–5.4)
SODIUM SERPL-SCNC: 139 MMOL/L (ref 136–145)
WBC # BLD AUTO: 5.8 K/UL (ref 3.9–12.7)

## 2024-10-06 PROCEDURE — 25000003 PHARM REV CODE 250: Mod: HCNC | Performed by: HOSPITALIST

## 2024-10-06 PROCEDURE — 25000003 PHARM REV CODE 250: Mod: HCNC | Performed by: NURSE PRACTITIONER

## 2024-10-06 PROCEDURE — 94640 AIRWAY INHALATION TREATMENT: CPT | Mod: HCNC

## 2024-10-06 PROCEDURE — 11000001 HC ACUTE MED/SURG PRIVATE ROOM: Mod: HCNC

## 2024-10-06 PROCEDURE — 83735 ASSAY OF MAGNESIUM: CPT | Mod: HCNC

## 2024-10-06 PROCEDURE — 80053 COMPREHEN METABOLIC PANEL: CPT | Mod: HCNC

## 2024-10-06 PROCEDURE — 84100 ASSAY OF PHOSPHORUS: CPT | Mod: HCNC

## 2024-10-06 PROCEDURE — 85025 COMPLETE CBC W/AUTO DIFF WBC: CPT | Mod: HCNC

## 2024-10-06 PROCEDURE — 63600175 PHARM REV CODE 636 W HCPCS: Mod: HCNC

## 2024-10-06 PROCEDURE — 94761 N-INVAS EAR/PLS OXIMETRY MLT: CPT | Mod: HCNC

## 2024-10-06 PROCEDURE — 63600175 PHARM REV CODE 636 W HCPCS: Mod: HCNC | Performed by: HOSPITALIST

## 2024-10-06 PROCEDURE — 63600175 PHARM REV CODE 636 W HCPCS: Mod: HCNC | Performed by: NURSE PRACTITIONER

## 2024-10-06 PROCEDURE — 36415 COLL VENOUS BLD VENIPUNCTURE: CPT | Mod: HCNC

## 2024-10-06 PROCEDURE — 25000003 PHARM REV CODE 250: Mod: HCNC

## 2024-10-06 PROCEDURE — 25000242 PHARM REV CODE 250 ALT 637 W/ HCPCS: Mod: HCNC | Performed by: STUDENT IN AN ORGANIZED HEALTH CARE EDUCATION/TRAINING PROGRAM

## 2024-10-06 RX ORDER — SODIUM,POTASSIUM PHOSPHATES 280-250MG
1 POWDER IN PACKET (EA) ORAL
Status: COMPLETED | OUTPATIENT
Start: 2024-10-06 | End: 2024-10-07

## 2024-10-06 RX ORDER — MAGNESIUM SULFATE HEPTAHYDRATE 40 MG/ML
2 INJECTION, SOLUTION INTRAVENOUS ONCE
Status: COMPLETED | OUTPATIENT
Start: 2024-10-06 | End: 2024-10-06

## 2024-10-06 RX ADMIN — SIMETHICONE 80 MG: 80 TABLET, CHEWABLE ORAL at 01:10

## 2024-10-06 RX ADMIN — METHOCARBAMOL TABLETS 750 MG: 750 TABLET, COATED ORAL at 09:10

## 2024-10-06 RX ADMIN — Medication 1 PACKET: at 11:10

## 2024-10-06 RX ADMIN — SERTRALINE HYDROCHLORIDE 100 MG: 50 TABLET ORAL at 08:10

## 2024-10-06 RX ADMIN — SIMETHICONE 80 MG: 80 TABLET, CHEWABLE ORAL at 06:10

## 2024-10-06 RX ADMIN — METOPROLOL TARTRATE 25 MG: 25 TABLET, FILM COATED ORAL at 08:10

## 2024-10-06 RX ADMIN — ARFORMOTEROL TARTRATE 15 MCG: 15 SOLUTION RESPIRATORY (INHALATION) at 08:10

## 2024-10-06 RX ADMIN — BUDESONIDE 0.5 MG: 0.5 INHALANT RESPIRATORY (INHALATION) at 07:10

## 2024-10-06 RX ADMIN — METHOCARBAMOL TABLETS 750 MG: 750 TABLET, COATED ORAL at 08:10

## 2024-10-06 RX ADMIN — METOPROLOL TARTRATE 25 MG: 25 TABLET, FILM COATED ORAL at 05:10

## 2024-10-06 RX ADMIN — THERA TABS 1 TABLET: TAB at 09:10

## 2024-10-06 RX ADMIN — GABAPENTIN 300 MG: 300 CAPSULE ORAL at 08:10

## 2024-10-06 RX ADMIN — INSULIN ASPART 2 UNITS: 100 INJECTION, SOLUTION INTRAVENOUS; SUBCUTANEOUS at 05:10

## 2024-10-06 RX ADMIN — METOPROLOL TARTRATE 25 MG: 25 TABLET, FILM COATED ORAL at 09:10

## 2024-10-06 RX ADMIN — GABAPENTIN 300 MG: 300 CAPSULE ORAL at 05:10

## 2024-10-06 RX ADMIN — METHOCARBAMOL TABLETS 750 MG: 750 TABLET, COATED ORAL at 01:10

## 2024-10-06 RX ADMIN — POTASSIUM BICARBONATE 50 MEQ: 977.5 TABLET, EFFERVESCENT ORAL at 11:10

## 2024-10-06 RX ADMIN — Medication 1 PACKET: at 05:10

## 2024-10-06 RX ADMIN — IPRATROPIUM BROMIDE 0.5 MG: 0.5 SOLUTION RESPIRATORY (INHALATION) at 12:10

## 2024-10-06 RX ADMIN — ENOXAPARIN SODIUM 40 MG: 40 INJECTION SUBCUTANEOUS at 05:10

## 2024-10-06 RX ADMIN — ARFORMOTEROL TARTRATE 15 MCG: 15 SOLUTION RESPIRATORY (INHALATION) at 07:10

## 2024-10-06 RX ADMIN — BUDESONIDE 0.5 MG: 0.5 INHALANT RESPIRATORY (INHALATION) at 08:10

## 2024-10-06 RX ADMIN — CETIRIZINE HYDROCHLORIDE 10 MG: 10 TABLET, FILM COATED ORAL at 08:10

## 2024-10-06 RX ADMIN — SIMETHICONE 80 MG: 80 TABLET, CHEWABLE ORAL at 09:10

## 2024-10-06 RX ADMIN — METOPROLOL TARTRATE 25 MG: 25 TABLET, FILM COATED ORAL at 01:10

## 2024-10-06 RX ADMIN — LOSARTAN POTASSIUM 100 MG: 25 TABLET, FILM COATED ORAL at 09:10

## 2024-10-06 RX ADMIN — ATORVASTATIN CALCIUM 10 MG: 10 TABLET, FILM COATED ORAL at 09:10

## 2024-10-06 RX ADMIN — BUPROPION HYDROCHLORIDE 150 MG: 150 TABLET, EXTENDED RELEASE ORAL at 09:10

## 2024-10-06 RX ADMIN — IPRATROPIUM BROMIDE 0.5 MG: 0.5 SOLUTION RESPIRATORY (INHALATION) at 07:10

## 2024-10-06 RX ADMIN — MAGNESIUM SULFATE HEPTAHYDRATE 2 G: 40 INJECTION, SOLUTION INTRAVENOUS at 09:10

## 2024-10-06 RX ADMIN — PREDNISONE 80 MG: 20 TABLET ORAL at 09:10

## 2024-10-06 RX ADMIN — PANTOPRAZOLE SODIUM 40 MG: 40 TABLET, DELAYED RELEASE ORAL at 09:10

## 2024-10-06 RX ADMIN — IPRATROPIUM BROMIDE 0.5 MG: 0.5 SOLUTION RESPIRATORY (INHALATION) at 08:10

## 2024-10-06 RX ADMIN — METHOCARBAMOL TABLETS 750 MG: 750 TABLET, COATED ORAL at 05:10

## 2024-10-06 RX ADMIN — MELOXICAM 7.5 MG: 7.5 TABLET ORAL at 09:10

## 2024-10-06 RX ADMIN — Medication 1 PACKET: at 08:10

## 2024-10-06 RX ADMIN — POTASSIUM BICARBONATE 50 MEQ: 977.5 TABLET, EFFERVESCENT ORAL at 09:10

## 2024-10-06 RX ADMIN — GABAPENTIN 300 MG: 300 CAPSULE ORAL at 09:10

## 2024-10-06 NOTE — PLAN OF CARE
Problem: Adult Inpatient Plan of Care  Goal: Optimal Comfort and Wellbeing  Outcome: Met  Intervention: Provide Person-Centered Care  Flowsheets (Taken 10/5/2024 1915)  Trust Relationship/Rapport:   care explained   emotional support provided   thoughts/feelings acknowledged   empathic listening provided   reassurance provided     Problem: Fall Injury Risk  Goal: Absence of Fall and Fall-Related Injury  Outcome: Met  Intervention: Identify and Manage Contributors  Flowsheets (Taken 10/5/2024 1915)  Self-Care Promotion:   independence encouraged   adaptive equipment use encouraged  Medication Review/Management: medications reviewed

## 2024-10-06 NOTE — ASSESSMENT & PLAN NOTE
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 Rose as outpatient  
"Patient's FSGs are controlled on current medication regimen.  Last A1c reviewed-   Lab Results   Component Value Date    HGBA1C 6.1 (H) 08/19/2024     Most recent fingerstick glucose reviewed- No results for input(s): "POCTGLUCOSE" in the last 24 hours.  Current correctional scale  Low  Maintain anti-hyperglycemic dose as follows-   Antihyperglycemics (From admission, onward)      Start     Stop Route Frequency Ordered    10/02/24 0226  insulin aspart U-100 pen 0-5 Units         -- SubQ Every 6 hours PRN 10/02/24 0127          Hold Oral hypoglycemics while patient is in the hospital.  "
69F with stage IV lung cancer who on September 12th was given nivolumab 360 (Opdivo, an immune checkpoint inhibitor) now presents with several days of worsening diarrhea, and over 12 reported episodes over the last 24 hours.  Watery and non-bloody.  Associated with diffuse cramping.     CT abdomen: No acute intra-abdominal abnormalities identified.   Endoscopy: Sloughing esophagitis with no bleeding. Normal stomach. Normal duodenum.   Colonoscopy- Multiple scattered non-bleeding erosions/superficial ulcers and surrounding bowel edema was found in the cecum and ascending colon. Multiple scattered non-bleeding erosions were found in the rectum.   Stool studies: c diff negative and without other infectious cause    D/w GI and Heme Onc: Presentation is concerning for possible ICI colitis.   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  Discussed with ID given high risk with lung cancer/chemo use and indeterminate Quantiferon will need to keep inpt pending PPD  
69F with stage IV lung cancer who on September 12th was given nivolumab 360 (Opdivo, an immune checkpoint inhibitor) now presents with several days of worsening diarrhea, and over 12 reported episodes over the last 24 hours.  Watery and non-bloody.  Associated with diffuse cramping.     CT abdomen: No acute intra-abdominal abnormalities identified.   Endoscopy: Sloughing esophagitis with no bleeding. Normal stomach. Normal duodenum.   Colonoscopy- Multiple scattered non-bleeding erosions/superficial ulcers and surrounding bowel edema was found in the cecum and ascending colon. Multiple scattered non-bleeding erosions were found in the rectum.   Stool studies: c diff negative and without other infectious cause    D/w GI and Heme Onc: Presentation is concerning for possible ICI colitis.   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  Discussed with ID given high risk with lung cancer/chemo use and indeterminate Quantiferon will need to keep inpt pending PPD results on (10/6)  
69F with stage IV lung cancer who on September 12th was given nivolumab 360 (Opdivo, an immune checkpoint inhibitor) now presents with several days of worsening diarrhea, and over 12 reported episodes over the last 24 hours.  Watery and non-bloody.  Associated with diffuse cramping.     D/w GI and Heme Onc: Presentation is concerning for possible ICI colitis.   Plans for colonoscopy with GI    GI and Heme-Onc consulted; greatly appreciate recs and assistance  Stool studies: C. diff, stool culture, calprotectin, lactoferrin, fecal elastase, and qualitative fecal fat  If alternative cause of colitis not found, then anticipate starting infliximab  Labs for anticipated use of infliximab: Hep B Surface Ag, Hep B core Ab total, quantiferon gold, HIV, HCV  IV fluids        
69F with stage IV lung cancer who on September 12th was given nivolumab 360 (Opdivo, an immune checkpoint inhibitor) now presents with several days of worsening diarrhea, and over 12 reported episodes over the last 24 hours.  Watery and non-bloody.  Associated with diffuse cramping.     D/w GI and Heme Onc: Presentation is concerning for possible ICI colitis.   Plans for colonoscopy with GI    GI and Heme-Onc consulted; greatly appreciate recs and assistance  Stool studies: c diff negative and without other infectious cause  If alternative cause of colitis not found, then anticipate starting infliximab  Labs for anticipated use of infliximab: Hep B Surface Ag, Hep B core Ab total, quantiferon gold, HIV, HCV  IV fluids  Colonscopy/EGD with evidence of erosions. GI discussing with IBD to determine initiation of steroids  Her screening quantiferon gold returned indeterminate. Discussed with ID will place PPD-tuberculin for further assessment and confirmation. Does not currently require precautions      
69F with stage IV lung cancer who on September 12th was given nivolumab 360 (Opdivo, an immune checkpoint inhibitor) now presents with several days of worsening diarrhea, and over 12 reported episodes over the last 24 hours.  Watery and non-bloody.  Associated with diffuse cramping.     D/w GI and Heme Onc: Presentation is concerning for possible ICI colitis.   Plans for colonoscopy with GI    GI and Heme-Onc consulted; greatly appreciate recs and assistance  Stool studies: c diff negative and without other infectious cause  Now tolerating PO and reports resolution of diarrhea/abdominal pain after starting steroids.  Discussed with ID given high risk with lung cancer/chemo use and indeterminate Quantiferon will need to keep inpt pending PPD results on (10/6)  
69F with stage IV lung cancer who on September 12th was given nivolumab 360 (Opdivo, an immune checkpoint inhibitor) now presents with several days of worsening diarrhea, and over 12 reported episodes over the last 24 hours.  Watery and non-bloody.  Associated with diffuse cramping.  Denies fever, chills, recent change in diet.  CT A/P w con: The visualized loops of small and large bowel show no evidence of obstruction or inflammation.     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  Initiating evaluation and treatment for this per MD Herrera algorithm ([email or web address])    GI and Heme-Onc consulted; greatly appreciate recs and assistance  Stool studies: C. diff, stool culture, calprotectin, lactoferrin, fecal elastase, and qualitative fecal fat  If alternative cause of colitis not found, then anticipate starting infliximab  Labs for anticipated use of infliximab: Hep B Surface Ag, Hep B core Ab total, quantiferon gold, HIV, HCV  IV fluids running  Antinausea in place; deferring Imodium to GI given possible scope and need for good visualization  NPO in anticipation of possible scope        
Anemia is likely due to chronic disease due to Malignancy. Most recent hemoglobin and hematocrit are listed below.  Recent Labs     09/30/24  1014 10/01/24  0835 10/02/24  0637   HGB 9.2* 9.1* 7.7*   HCT 31.5* 31.1* 26.9*     Plan  - Monitor serial CBC: Daily  - Transfuse PRBC if patient becomes hemodynamically unstable, symptomatic or H/H drops below 7/21.  - Patient has not received any PRBC transfusions to date  - Patient's anemia is currently stable  - hemoglobin today of 7.7. baseline of 7.8 to 9.1 per chart review. Denies witnessed bleeding. Will hold transfusion  
Anemia is likely due to chronic disease due to Malignancy. Most recent hemoglobin and hematocrit are listed below.  Recent Labs     10/03/24  0517 10/04/24  0519 10/05/24  0548   HGB 9.3* 7.9* 8.3*   HCT 31.1* 27.5* 28.4*       Plan  - Monitor serial CBC: Daily  - Transfuse PRBC if patient becomes hemodynamically unstable, symptomatic or H/H drops below 7/21.  - Patient has not received any PRBC transfusions to date  - Patient's anemia is currently stable  
BP Readings from Last 3 Encounters:   10/05/24 139/66   09/23/24 (!) 142/67   09/13/24 (!) 152/60     Continuing home medications as prescribed  Will cont to monitor and adjust as needed  Will utilize p.r.n. blood pressure medication only if patient's blood pressure greater than 180/110 and she develops symptoms such as worsening chest pain or shortness of breath.  Cardiac diet    Hypertension Medications               amLODIPine (NORVASC) 5 MG tablet TAKE 1 TABLET BY MOUTH EVERY DAY    losartan (COZAAR) 100 MG tablet TAKE 1 TABLET BY MOUTH EVERY DAY    metoprolol succinate (TOPROL-XL) 100 MG 24 hr tablet TAKE 1 TABLET BY MOUTH EVERY DAY          
BP Readings from Last 3 Encounters:   10/06/24 (!) 140/65   09/23/24 (!) 142/67   09/13/24 (!) 152/60     Continuing home medications as prescribed  Will cont to monitor and adjust as needed  Will utilize p.r.n. blood pressure medication only if patient's blood pressure greater than 180/110 and she develops symptoms such as worsening chest pain or shortness of breath.  Cardiac diet    Hypertension Medications             amLODIPine (NORVASC) 5 MG tablet TAKE 1 TABLET BY MOUTH EVERY DAY    losartan (COZAAR) 100 MG tablet TAKE 1 TABLET BY MOUTH EVERY DAY    metoprolol succinate (TOPROL-XL) 100 MG 24 hr tablet TAKE 1 TABLET BY MOUTH EVERY DAY        
Body mass index is 32.46 kg/m². Morbid obesity complicates all aspects of disease management from diagnostic modalities to treatment. Weight loss encouraged and health benefits explained to patient.       
Body mass index is 35.32 kg/m². Morbid obesity complicates all aspects of disease management from diagnostic modalities to treatment. Weight loss encouraged and health benefits explained to patient.       
Chronic, controlled. Latest blood pressure and vitals reviewed-     Temp:  [97.6 °F (36.4 °C)-98.6 °F (37 °C)]   Pulse:  []   Resp:  [16-26]   BP: (118-140)/(62-68)   SpO2:  [93 %-95 %] .   Home meds for hypertension were reviewed and noted below.   Hypertension Medications               amLODIPine (NORVASC) 5 MG tablet TAKE 1 TABLET BY MOUTH EVERY DAY    losartan (COZAAR) 100 MG tablet TAKE 1 TABLET BY MOUTH EVERY DAY    metoprolol succinate (TOPROL-XL) 100 MG 24 hr tablet TAKE 1 TABLET BY MOUTH EVERY DAY            While in the hospital, will manage blood pressure as follows; Adjust home antihypertensive regimen as follows- hold amlodipine and switch Toprol 100 to Lopressor 25 QID until her volume status is more stable    Will utilize p.r.n. blood pressure medication only if patient's blood pressure greater than 180/110 and she develops symptoms such as worsening chest pain or shortness of breath.  
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  
Continue home Oxycodone 5 mg q4h PRN    
Continue home Oxycodone 5 mg q4h PRN    
Continue home Wellbutrin    
Continue home Wellbutrin    
Follows everardo Avina and Aide Magaña NP, who is following and providing much-appreciated assistance.    
Follows everardo Avina and Aide Magaña NP, who is following and providing much-appreciated assistance.    
Patient has Abnormal Magnesium: hypomagnesemia. Will continue to monitor electrolytes closely. Will replace the affected electrolytes and repeat labs to be done after interventions completed. The patient's magnesium results have been reviewed and are listed below.  Recent Labs   Lab 10/02/24  0638   MG 0.9*      
Patient has Abnormal Magnesium: hypomagnesemia. Will continue to monitor electrolytes closely. Will replace the affected electrolytes and repeat labs to be done after interventions completed. The patient's magnesium results have been reviewed and are listed below.  Recent Labs   Lab 10/03/24  0516   MG 1.0*  1.1*      Continues to remain low, continue aggressive IV supplementation and IV phos as Phos is low  
Patient has Abnormal Magnesium: hypomagnesemia. Will continue to monitor electrolytes closely. Will replace the affected electrolytes and repeat labs to be done after interventions completed. The patient's magnesium results have been reviewed and are listed below.  Recent Labs   Lab 10/04/24  0519   MG 1.3*      Improving and now tolerating PO. Will supplement with IV today, but suspect will remain up now that tolerating PO  
Patient has Abnormal Magnesium: hypomagnesemia. Will continue to monitor electrolytes closely. Will replace the affected electrolytes and repeat labs to be done after interventions completed. The patient's magnesium results have been reviewed and are listed below.  Recent Labs   Lab 10/05/24  0548   MG 1.4*     Replace and repeat  
Patient's FSGs are controlled on current medication regimen.  Last A1c reviewed-   Lab Results   Component Value Date    HGBA1C 6.1 (H) 08/19/2024     Most recent fingerstick glucose reviewed-   Recent Labs   Lab 10/04/24  2002   POCTGLUCOSE 144*     Current correctional scale  Low  Maintain anti-hyperglycemic dose as follows-   Antihyperglycemics (From admission, onward)    Start     Stop Route Frequency Ordered    10/02/24 0226  insulin aspart U-100 pen 0-5 Units         -- SubQ Every 6 hours PRN 10/02/24 0127        Hold Oral hypoglycemics while patient is in the hospital.  
Patient's FSGs are controlled on current medication regimen.  Last A1c reviewed-   Lab Results   Component Value Date    HGBA1C 6.1 (H) 08/19/2024     Most recent fingerstick glucose reviewed-   Recent Labs   Lab 10/05/24  1056 10/05/24  1759 10/05/24  2250   POCTGLUCOSE 153* 231* 156*       Current correctional scale  Low  Maintain anti-hyperglycemic dose as follows-   Antihyperglycemics (From admission, onward)    Start     Stop Route Frequency Ordered    10/02/24 0226  insulin aspart U-100 pen 0-5 Units         -- SubQ Every 6 hours PRN 10/02/24 0127        Hold Oral hypoglycemics while patient is in the hospital.  
Plan as above.     
Stable   Cont BB  Cont to monitor I/O's and daily weights.  Fluid restriction (2 liters/24 hours)  Low Na diet  Monitor for signs of fluid overload: RR>30, O2 sat<92%, weight gain of >3 lbs, or urinary output <160ml/8hr  Maintain oxygen sats >92% via NC if supplemental oxygen needed.     Patient Vitals for the past 72 hrs (Last 3 readings):   Weight   10/03/24 1642 87.6 kg (193 lb 2 oz)       Diuretics (From admission, onward)    None        Cardiac/Autonomic (From admission, onward)    Start     Stop Route Frequency Ordered    10/02/24 0900  metoprolol tartrate (LOPRESSOR) tablet 25 mg         -- Oral 4 times daily 10/02/24 0107    10/02/24 0900  losartan tablet 100 mg         -- Oral Daily 10/02/24 0107    10/02/24 0900  atorvastatin tablet 10 mg         -- Oral Daily 10/02/24 0107        
Stable   Cont BB  Cont to monitor I/O's and daily weights.  Fluid restriction (2 liters/24 hours)  Low Na diet  Monitor for signs of fluid overload: RR>30, O2 sat<92%, weight gain of >3 lbs, or urinary output <160ml/8hr  Maintain oxygen sats >92% via NC if supplemental oxygen needed.     Patient Vitals for the past 72 hrs (Last 3 readings):   Weight   10/03/24 1642 87.6 kg (193 lb 2 oz)     Diuretics (From admission, onward)      None          Cardiac/Autonomic (From admission, onward)      Start     Stop Route Frequency Ordered    10/02/24 0900  metoprolol tartrate (LOPRESSOR) tablet 25 mg         -- Oral 4 times daily 10/02/24 0107    10/02/24 0900  losartan tablet 100 mg         -- Oral Daily 10/02/24 0107    10/02/24 0900  atorvastatin tablet 10 mg         -- Oral Daily 10/02/24 0107          
Stable on admission.  Transitioning Toprol 100 to Lopressor 25 QID until she is more stable volume-wise.    
Treatment induced  Hb 7.7g/dL  Check Fe studies  Closely monitor counts  Consider transfusion if Hb<7g/dl   
Treatment induced  Improved   Hb 9.3 g/dL  Closely monitor counts  Consider transfusion if Hb<7g/dl   
Initial (On Arrival)

## 2024-10-06 NOTE — SUBJECTIVE & OBJECTIVE
Follow-up For:  Patient Active Problem List    Diagnosis Date Noted Date Diagnosed    Acute colitis 10/01/2024      Discharge Planning   GUERRERO:    Discharge Plan A: Home with family        Interval History:   Subjective: Hannah Montoya is being followed for Acute colitis. No acute events overnight. Symptoms significantly improved. She denies diarrhea. Last BM was more formed than previous. She is being treated with oral prednisone and tolerating treatment. Her blood sugars are mildly elevated requiring periodic SQ insulin. She is tolerating PO and eating 100% of meals. She denies any nausea. She also denies any pain. Replacing electrolytes this morning.     Symptoms: Improved. The patient denies shortness of breath, malaise, cough, chest pain, weakness, headache, chest pressure, anorexia, diarrhea and anxiety.     Diet: Regular. Adequate intake. The patient denies nausea and vomiting.    Last Bowel Movement: 10/05/24    Activity Level: Impaired due to weakness    Ambulation: Walker    Pain: The patient Denies pain       Review of Systems   Constitutional:  Negative for chills and fever.   Respiratory:  Negative for cough and shortness of breath.    Cardiovascular:  Negative for chest pain and palpitations.   Gastrointestinal:  Negative for abdominal pain.        Scheduled Meds:   arformoteroL  15 mcg Nebulization BID    atorvastatin  10 mg Oral Daily    budesonide  0.5 mg Nebulization Q12H    buPROPion  150 mg Oral Daily    cetirizine  10 mg Oral QHS    enoxparin  40 mg Subcutaneous Daily    gabapentin  300 mg Oral TID    ipratropium  0.5 mg Nebulization Q6H WAKE    losartan  100 mg Oral Daily    magnesium sulfate IVPB  2 g Intravenous Once    meloxicam  7.5 mg Oral Daily    methocarbamoL  750 mg Oral QID    metoprolol tartrate  25 mg Oral QID    miconazole NITRATE 2 %   Topical (Top) BID    multivitamin  1 tablet Oral Daily    pantoprazole  40 mg Oral Daily    potassium bicarbonate  50 mEq Oral Q3H     potassium, sodium phosphates  1 packet Oral QID (WM & HS)    predniSONE  80 mg Oral Daily    sertraline  100 mg Oral QHS    simethicone  1 tablet Oral TID PC     Continuous Infusions:  PRN Meds:.  Current Facility-Administered Medications:     acetaminophen, 1,000 mg, Oral, Q8H PRN    albuterol sulfate, 2.5 mg, Nebulization, Q4H PRN    benzonatate, 200 mg, Oral, TID PRN    dextrose 10%, 12.5 g, Intravenous, PRN    dextrose 10%, 12.5 g, Intravenous, PRN    dextrose 10%, 25 g, Intravenous, PRN    glucagon (human recombinant), 1 mg, Intramuscular, PRN    glucagon (human recombinant), 1 mg, Intramuscular, PRN    glucose, 16 g, Oral, PRN    glucose, 24 g, Oral, PRN    insulin aspart U-100, 0-5 Units, Subcutaneous, Q6H PRN    melatonin, 6 mg, Oral, Nightly PRN    naloxone, 0.02 mg, Intravenous, PRN    oxyCODONE, 5 mg, Oral, Q4H PRN    sodium chloride 0.9%, 10 mL, Intravenous, Q12H PRN      Objective:     Vitals:    10/05/24 2337 10/06/24 0428 10/06/24 0709 10/06/24 0733   BP: (!) 158/72 (!) 148/84 (!) 140/65    BP Location:  Left arm     Patient Position: Lying Lying Lying    Pulse: 88 87 86 86   Resp: 18 17 16 18   Temp: 98 °F (36.7 °C) 97.9 °F (36.6 °C) 97.4 °F (36.3 °C)    TempSrc: Oral Oral     SpO2: 100% 98% 97% 98%   Weight:       Height:           Patient Vitals for the past 72 hrs (Last 3 readings):   Weight   10/03/24 1642 87.6 kg (193 lb 2 oz)       Intake/Output Summary (Last 24 hours) at 10/6/2024 0755  Last data filed at 10/5/2024 2245  Gross per 24 hour   Intake 1080 ml   Output --   Net 1080 ml     Net IO Since Admission: 2,972.76 mL [10/06/24 0755]    Physical Exam  Vitals and nursing note reviewed.   Constitutional:       General: She is awake. She is not in acute distress.     Appearance: Normal appearance. She is well-developed and well-groomed. She is not toxic-appearing.   HENT:      Head: Normocephalic and atraumatic.   Cardiovascular:      Rate and Rhythm: Normal rate.   Pulmonary:      Effort: No  tachypnea, accessory muscle usage or respiratory distress.   Abdominal:      General: There is no distension.   Neurological:      Mental Status: She is alert and oriented to person, place, and time. Mental status is at baseline.   Psychiatric:         Mood and Affect: Mood normal.         Behavior: Behavior normal. Behavior is cooperative.         Thought Content: Thought content normal.       Significant Labs: All pertinent labs within the past 24 hours have been reviewed.    BMP (Last 3 Results):   Recent Labs   Lab 10/04/24  0519 10/05/24  0548 10/06/24  0516   * 132* 107   * 136 139   K 3.7 3.2* 3.3*    107 112*   CO2 21* 22* 19*   BUN 9 11 10   CREATININE 0.6 0.7 0.7   CALCIUM 8.5* 8.5* 8.6*   MG 1.3* 1.4* 1.6     CBC (Last 3 Results):   Recent Labs   Lab 10/04/24  0519 10/05/24  0548 10/06/24  0515   WBC 3.94 6.49 5.80   RBC 3.65* 3.70* 3.75*   HGB 7.9* 8.3* 8.2*   HCT 27.5* 28.4* 29.0*    322 310   MCV 75* 77* 77*   MCH 21.6* 22.4* 21.9*   MCHC 28.7* 29.2* 28.3*       Significant Imaging: I have reviewed all pertinent imaging results/findings within the past 24 hours.  CT Abdomen Pelvis With IV Contrast Routine Oral Contrast  Narrative: EXAMINATION:  CT ABDOMEN PELVIS WITH IV CONTRAST    CLINICAL HISTORY:  Abdominal pain, acute, nonlocalized;    TECHNIQUE:  Low dose axial images, sagittal and coronal reformations were obtained from the lung bases to the pubic symphysis following the IV administration of 75 mL of Omnipaque 350 .  Oral contrast was not given.    COMPARISON:  CT chest abdomen pelvis 09/18/2024.    FINDINGS:  Heart is normal in size.  Pacer wires are partially visualized.  There is persistent right lower lobe collapse/consolidation with small right pleural effusion.  Left lung base is relatively clear.    No significant hepatic abnormalities are identified.  There is no intra-or extrahepatic biliary ductal dilatation.  Gallbladder has been removed.  The stomach,  pancreas, and right adrenal gland are unremarkable.  There is mild left adrenal nodular thickening.  Spleen is mildly enlarged measuring 13 cm.    Kidneys enhance normally with no evidence of hydronephrosis.  No abnormalities are seen along the ureteral courses.  Urinary bladder is nondistended.  There is mild lobular configuration of the uterus with suspected small underlying fibroids seen.    Appendix is visualized and is unremarkable.  The visualized loops of small and large bowel show no evidence of obstruction or inflammation.  No free air or free fluid.    Aorta tapers normally.    No acute osseous abnormality identified.  There is lower lumbar facet arthropathy.  Similar appearance of the left hip with associated adjacent subcutaneous soft tissue thickening and stranding.  Postoperative ORIF changes are seen involving the left proximal femur.  Impression: 1. No acute intra-abdominal abnormalities identified.  2. Persistent right lower lobe collapse and consolidation with small right pleural effusion.  3. Postsurgical changes and additional findings as detailed above.    Electronically signed by: Dwaine Clay MD  Date:    10/01/2024  Time:    20:24      Review of Systems   Constitutional:  Negative for chills and fever.   Respiratory:  Negative for cough and shortness of breath.    Cardiovascular:  Negative for chest pain and palpitations.   Gastrointestinal:  Negative for abdominal pain.     Physical Exam  Vitals and nursing note reviewed.   Constitutional:       General: She is awake. She is not in acute distress.     Appearance: Normal appearance. She is well-developed and well-groomed. She is not toxic-appearing.   HENT:      Head: Normocephalic and atraumatic.   Cardiovascular:      Rate and Rhythm: Normal rate.   Pulmonary:      Effort: No tachypnea, accessory muscle usage or respiratory distress.   Abdominal:      General: There is no distension.   Neurological:      Mental Status: She is alert and  oriented to person, place, and time. Mental status is at baseline.   Psychiatric:         Mood and Affect: Mood normal.         Behavior: Behavior normal. Behavior is cooperative.         Thought Content: Thought content normal.

## 2024-10-06 NOTE — NURSING
Pt sitting up in chair, transferred to bed with assist x1.  Purewick placed on the pt.  IV's flushed and saline locked.  BG monitored.  Diet tolerated well.  Pt reports pain to left hip, PRN medication available.  No acute distress noted, pt free from falls or injury.  Bed in low position, wheels locked, call light in reach for assistance, will continue to monitor.    [facility]  Nurses Note -- 4 Eyes      10/5/2024       Skin assessed on: Q Shift      [x] No Pressure Injuries Present    [x]Prevention Measures Documented    [] Yes LDA  for Pressure Injury Previously documented     [] Yes New Pressure Injury Discovered   [] LDA for New Pressure Injury Added      Attending RN:  Junie Sr RN     Second RN:  TYLER Persaud

## 2024-10-06 NOTE — NURSING
[facility], [facility]  Nurses Note -- 4 Eyes      10/6/2024       Skin assessed on: Q Shift      [x] No Pressure Injuries Present    [x]Prevention Measures Documented    [] Yes LDA  for Pressure Injury Previously documented     [] Yes New Pressure Injury Discovered   [] LDA for New Pressure Injury Added      Attending RN:  Marj Brooks LPN     Second RN:  Junie

## 2024-10-06 NOTE — NURSING
Patient is sitting in bed resting and watching tv. She is awaiting her discharge and no pain is reported. Bed is in the lowest position call light is within reach. All safety needs are being met.

## 2024-10-06 NOTE — PLAN OF CARE
Problem: Adult Inpatient Plan of Care  Goal: Plan of Care Review  Outcome: Progressing  Goal: Patient-Specific Goal (Individualized)  Outcome: Progressing  Goal: Absence of Hospital-Acquired Illness or Injury  Outcome: Progressing  Goal: Readiness for Transition of Care  Outcome: Progressing

## 2024-10-06 NOTE — CARE UPDATE
10/05/24 2000   Patient Assessment/Suction   Level of Consciousness (AVPU) alert   Respiratory Effort Unlabored;Normal   Expansion/Accessory Muscles/Retractions no use of accessory muscles;no retractions   All Lung Fields Breath Sounds Anterior:;clear;diminished   Rhythm/Pattern, Respiratory unlabored;no shortness of breath reported;depth regular;pattern regular   Cough Frequency infrequent   Cough Type no productive sputum

## 2024-10-07 VITALS
WEIGHT: 193.13 LBS | DIASTOLIC BLOOD PRESSURE: 66 MMHG | SYSTOLIC BLOOD PRESSURE: 115 MMHG | RESPIRATION RATE: 18 BRPM | HEIGHT: 62 IN | HEART RATE: 86 BPM | OXYGEN SATURATION: 97 % | TEMPERATURE: 98 F | BODY MASS INDEX: 35.54 KG/M2

## 2024-10-07 LAB
ALBUMIN SERPL BCP-MCNC: 2 G/DL (ref 3.5–5.2)
ALP SERPL-CCNC: 79 U/L (ref 55–135)
ALT SERPL W/O P-5'-P-CCNC: 9 U/L (ref 10–44)
ANION GAP SERPL CALC-SCNC: 5 MMOL/L (ref 8–16)
AST SERPL-CCNC: 9 U/L (ref 10–40)
BASOPHILS # BLD AUTO: 0.03 K/UL (ref 0–0.2)
BASOPHILS NFR BLD: 0.5 % (ref 0–1.9)
BILIRUB SERPL-MCNC: 0.2 MG/DL (ref 0.1–1)
BUN SERPL-MCNC: 12 MG/DL (ref 8–23)
CALCIUM SERPL-MCNC: 8.7 MG/DL (ref 8.7–10.5)
CALPROTECTIN STL-MCNT: ABNORMAL MCG/G
CHLORIDE SERPL-SCNC: 116 MMOL/L (ref 95–110)
CO2 SERPL-SCNC: 16 MMOL/L (ref 23–29)
CREAT SERPL-MCNC: 0.8 MG/DL (ref 0.5–1.4)
DIFFERENTIAL METHOD BLD: ABNORMAL
EOSINOPHIL # BLD AUTO: 0 K/UL (ref 0–0.5)
EOSINOPHIL NFR BLD: 0.2 % (ref 0–8)
ERYTHROCYTE [DISTWIDTH] IN BLOOD BY AUTOMATED COUNT: 19.9 % (ref 11.5–14.5)
EST. GFR  (NO RACE VARIABLE): >60 ML/MIN/1.73 M^2
GLUCOSE SERPL-MCNC: 109 MG/DL (ref 70–110)
HCT VFR BLD AUTO: 28.9 % (ref 37–48.5)
HGB BLD-MCNC: 8.2 G/DL (ref 12–16)
IMM GRANULOCYTES # BLD AUTO: 0.16 K/UL (ref 0–0.04)
IMM GRANULOCYTES NFR BLD AUTO: 2.6 % (ref 0–0.5)
LYMPHOCYTES # BLD AUTO: 0.6 K/UL (ref 1–4.8)
LYMPHOCYTES NFR BLD: 9.4 % (ref 18–48)
MAGNESIUM SERPL-MCNC: 1.6 MG/DL (ref 1.6–2.6)
MCH RBC QN AUTO: 22 PG (ref 27–31)
MCHC RBC AUTO-ENTMCNC: 28.4 G/DL (ref 32–36)
MCV RBC AUTO: 78 FL (ref 82–98)
MONOCYTES # BLD AUTO: 0.7 K/UL (ref 0.3–1)
MONOCYTES NFR BLD: 12.1 % (ref 4–15)
NEUTROPHILS # BLD AUTO: 4.5 K/UL (ref 1.8–7.7)
NEUTROPHILS NFR BLD: 75.2 % (ref 38–73)
NRBC BLD-RTO: 0 /100 WBC
PHOSPHATE SERPL-MCNC: 2.2 MG/DL (ref 2.7–4.5)
PLATELET # BLD AUTO: 298 K/UL (ref 150–450)
PMV BLD AUTO: 10 FL (ref 9.2–12.9)
POCT GLUCOSE: 107 MG/DL (ref 70–110)
POCT GLUCOSE: 195 MG/DL (ref 70–110)
POTASSIUM SERPL-SCNC: 3.6 MMOL/L (ref 3.5–5.1)
PROT SERPL-MCNC: 5.1 G/DL (ref 6–8.4)
RBC # BLD AUTO: 3.72 M/UL (ref 4–5.4)
SODIUM SERPL-SCNC: 137 MMOL/L (ref 136–145)
WBC # BLD AUTO: 6.04 K/UL (ref 3.9–12.7)

## 2024-10-07 PROCEDURE — 25000003 PHARM REV CODE 250: Mod: HCNC

## 2024-10-07 PROCEDURE — 83735 ASSAY OF MAGNESIUM: CPT | Mod: HCNC

## 2024-10-07 PROCEDURE — 25000242 PHARM REV CODE 250 ALT 637 W/ HCPCS: Mod: HCNC | Performed by: STUDENT IN AN ORGANIZED HEALTH CARE EDUCATION/TRAINING PROGRAM

## 2024-10-07 PROCEDURE — 85025 COMPLETE CBC W/AUTO DIFF WBC: CPT | Mod: HCNC

## 2024-10-07 PROCEDURE — 63600175 PHARM REV CODE 636 W HCPCS: Mod: HCNC | Performed by: NURSE PRACTITIONER

## 2024-10-07 PROCEDURE — 25000003 PHARM REV CODE 250: Mod: HCNC | Performed by: HOSPITALIST

## 2024-10-07 PROCEDURE — 97530 THERAPEUTIC ACTIVITIES: CPT | Mod: HCNC,CQ

## 2024-10-07 PROCEDURE — 25000003 PHARM REV CODE 250: Mod: HCNC | Performed by: NURSE PRACTITIONER

## 2024-10-07 PROCEDURE — 99233 SBSQ HOSP IP/OBS HIGH 50: CPT | Mod: HCNC,,, | Performed by: NURSE PRACTITIONER

## 2024-10-07 PROCEDURE — 80053 COMPREHEN METABOLIC PANEL: CPT | Mod: HCNC

## 2024-10-07 PROCEDURE — 94761 N-INVAS EAR/PLS OXIMETRY MLT: CPT | Mod: HCNC

## 2024-10-07 PROCEDURE — 84100 ASSAY OF PHOSPHORUS: CPT | Mod: HCNC

## 2024-10-07 PROCEDURE — 94640 AIRWAY INHALATION TREATMENT: CPT | Mod: HCNC

## 2024-10-07 PROCEDURE — 36415 COLL VENOUS BLD VENIPUNCTURE: CPT | Mod: HCNC

## 2024-10-07 RX ORDER — POTASSIUM CHLORIDE 20 MEQ/1
40 TABLET, EXTENDED RELEASE ORAL ONCE
Status: COMPLETED | OUTPATIENT
Start: 2024-10-07 | End: 2024-10-07

## 2024-10-07 RX ORDER — PREDNISONE 20 MG/1
TABLET ORAL
Qty: 110 TABLET | Refills: 0 | Status: SHIPPED | OUTPATIENT
Start: 2024-10-08 | End: 2024-11-29

## 2024-10-07 RX ORDER — SODIUM,POTASSIUM PHOSPHATES 280-250MG
2 POWDER IN PACKET (EA) ORAL ONCE
Status: COMPLETED | OUTPATIENT
Start: 2024-10-07 | End: 2024-10-07

## 2024-10-07 RX ADMIN — THERA TABS 1 TABLET: TAB at 08:10

## 2024-10-07 RX ADMIN — IPRATROPIUM BROMIDE 0.5 MG: 0.5 SOLUTION RESPIRATORY (INHALATION) at 09:10

## 2024-10-07 RX ADMIN — BUDESONIDE 0.5 MG: 0.5 INHALANT RESPIRATORY (INHALATION) at 09:10

## 2024-10-07 RX ADMIN — POTASSIUM CHLORIDE 40 MEQ: 1500 TABLET, EXTENDED RELEASE ORAL at 02:10

## 2024-10-07 RX ADMIN — MELOXICAM 7.5 MG: 7.5 TABLET ORAL at 08:10

## 2024-10-07 RX ADMIN — PREDNISONE 80 MG: 20 TABLET ORAL at 08:10

## 2024-10-07 RX ADMIN — SIMETHICONE 80 MG: 80 TABLET, CHEWABLE ORAL at 09:10

## 2024-10-07 RX ADMIN — ARFORMOTEROL TARTRATE 15 MCG: 15 SOLUTION RESPIRATORY (INHALATION) at 09:10

## 2024-10-07 RX ADMIN — LOSARTAN POTASSIUM 100 MG: 25 TABLET, FILM COATED ORAL at 08:10

## 2024-10-07 RX ADMIN — Medication 2 PACKET: at 02:10

## 2024-10-07 RX ADMIN — BUPROPION HYDROCHLORIDE 150 MG: 150 TABLET, EXTENDED RELEASE ORAL at 08:10

## 2024-10-07 RX ADMIN — SIMETHICONE 80 MG: 80 TABLET, CHEWABLE ORAL at 02:10

## 2024-10-07 RX ADMIN — Medication 1 PACKET: at 08:10

## 2024-10-07 RX ADMIN — METHOCARBAMOL TABLETS 750 MG: 750 TABLET, COATED ORAL at 08:10

## 2024-10-07 RX ADMIN — GABAPENTIN 300 MG: 300 CAPSULE ORAL at 08:10

## 2024-10-07 RX ADMIN — GABAPENTIN 300 MG: 300 CAPSULE ORAL at 02:10

## 2024-10-07 RX ADMIN — PANTOPRAZOLE SODIUM 40 MG: 40 TABLET, DELAYED RELEASE ORAL at 08:10

## 2024-10-07 RX ADMIN — METOPROLOL TARTRATE 25 MG: 25 TABLET, FILM COATED ORAL at 08:10

## 2024-10-07 NOTE — PLAN OF CARE
Anesthesia PreOp Note    HPI:     Jarrett Bal is a 50 year old male who presents for preoperative consultation requested by: Antonio Martin MD    Date of Surgery: 3/2/2024 - 3/4/2024    Procedure(s):  ESOPHAGOGASTRODUODENOSCOPY (EGD)  Indication: none given    Relevant Problems   No relevant active problems       NPO:  Last Liquid Consumption Date: 03/03/24  Last Liquid Consumption Time: 2300  Last Solid Consumption Date: 03/03/24  Last Solid Consumption Time: 1900  Last Liquid Consumption Date: 03/03/24          History Review:  Patient Active Problem List    Diagnosis Date Noted    Portal vein thrombosis 03/03/2024    Hyperglycemia 02/17/2024    Acute pancreatitis, unspecified complication status, unspecified pancreatitis type (HCC) 02/17/2024    Malfunction of jejunostomy tube (HCC) 06/01/2023    Prolonged QT interval 05/19/2023    Lightheaded 05/19/2023    Weakness generalized 05/19/2023    Traumatic injury of head, initial encounter 05/19/2023    Hypotension, unspecified hypotension type 05/19/2023    Pneumoperitoneum     CARLOS A (acute kidney injury) ([facility])     Hyponatremia     Metabolic alkalosis     Gastric outlet obstruction (HCC) 04/27/2023    Gastric mass 04/12/2023    Duodenitis 01/15/2019    Hypokalemia 08/07/2018       Past Medical History:   Diagnosis Date    Anesthesia complication     cramps;nausea after anesthesia when in 3rd grade    Anxiety state     Duodenitis     Pancreatitis (HCC)     PONV (postoperative nausea and vomiting)        Past Surgical History:   Procedure Laterality Date    BIOPSY/REMOVAL, LYMPH NODE(S)      R neck    GASTROSTOMY/JEJUNOSTOMY TUBE N/A        Medications Prior to Admission   Medication Sig Dispense Refill Last Dose    Pancrelipase, Lip-Prot-Amyl, (CREON) [id number] units Oral Cap DR Particles Take 36,000 Units by mouth 3 (three) times daily with meals.   Past Week    HYDROcodone-acetaminophen  MG Oral Tab Take 1 tablet by mouth every 6 (six) hours as needed for  Pt resting in bed, commode at the bedside for use.  1 large BM this shift, pt still reports abdominal discomfort.  Diet tolerated well.  No acute distress noted.      Problem: Adult Inpatient Plan of Care  Goal: Plan of Care Review  Outcome: Progressing     Problem: Adult Inpatient Plan of Care  Goal: Patient-Specific Goal (Individualized)  Outcome: Progressing     Problem: Adult Inpatient Plan of Care  Goal: Absence of Hospital-Acquired Illness or Injury  Outcome: Progressing      Pain. 15 tablet 0 Past Week    acetaminophen 500 MG Oral Tab Take 1 tablet (500 mg total) by mouth every 4 (four) hours as needed for Fever (equal to or greater than 100.4).   Past Week     Current Facility-Administered Medications Ordered in Epic   Medication Dose Route Frequency Provider Last Rate Last Admin    pancrelipase (Lip-Prot-Amyl) (Zenpep) DR particles cap 5,000 Units  5,000 Units Oral TID CC Ayleen Christianson MD   5,000 Units at 03/03/24 1719    lactated ringers infusion   Intravenous Continuous ManpreettyBishop cloud MD 75 mL/hr at 03/04/24 0600 New Bag at 03/04/24 0600    polyethylene glycol (PEG 3350) (Miralax) 17 g oral packet 17 g  17 g Oral Daily Ayleen Christianson MD        heparin (Porcine) 5000 UNIT/ML injection 5,000 Units  5,000 Units Subcutaneous Q8H FARZANA Bishop Garcia MD   5,000 Units at 03/04/24 0600    acetaminophen (Tylenol Extra Strength) tab 500 mg  500 mg Oral Q4H PRN Bishop Garcia MD        melatonin tab 3 mg  3 mg Oral Nightly PRN Bishop Garcia MD        polyethylene glycol (PEG 3350) (Miralax) 17 g oral packet 17 g  17 g Oral Daily PRN Bishop Garcia MD   17 g at 03/03/24 1300    sennosides (Senokot) tab 17.2 mg  17.2 mg Oral Nightly PRN Bishop Garcia MD        bisacodyl (Dulcolax) 10 MG rectal suppository 10 mg  10 mg Rectal Daily PRN Bishop Garcia MD        fleet enema (Fleet) 7-19 GM/118ML rectal enema 133 mL  1 enema Rectal Once PRN Bishop Garcia MD        prochlorperazine (Compazine) 10 MG/2ML injection 5 mg  5 mg Intravenous Q8H PRN Bishop Garcia MD        glycerin-hypromellose- (Artifical Tears) 0.2-0.2-1 % ophthalmic solution 1 drop  1 drop Both Eyes QID PRN Bishop Garcia MD        sodium chloride (Saline Mist) 0.65 % nasal solution 1 spray  1 spray Each Nare Q3H PRN Bishop Garcia MD        ondansetron (Zofran) 4 MG/2ML injection 8 mg  8 mg Intravenous Q4H PRN Smuljung,  Bishop Ba MD        morphINE PF 2 MG/ML injection 1 mg  1 mg Intravenous Q2H PRN Bishop Garcia MD        Or    morphINE PF 2 MG/ML injection 2 mg  2 mg Intravenous Q2H PRN Bishop Garcia MD   2 mg at 24 1712    Or    morphINE PF 4 MG/ML injection 4 mg  4 mg Intravenous Q2H PRN Bishop Garcia MD   4 mg at 24 1014    pantoprazole (Protonix) 40 mg in sodium chloride 0.9% PF 10 mL IV push  40 mg Intravenous Daily Bishop Garcia MD   40 mg at 24 0820     No current [facility]-ordered outpatient medications on file.       No Known Allergies    Family History   Problem Relation Age of Onset    Hypertension Father     Hypertension Mother     Psychiatric Mother      Social History     Socioeconomic History    Marital status:    Tobacco Use    Smoking status: Former     Packs/day: 0.50     Years: 25.00     Additional pack years: 0.00     Total pack years: 12.50     Types: Cigarettes     Quit date: 2023     Years since quittin.8     Passive exposure: Never    Smokeless tobacco: Never    Tobacco comments:     0.5-1 PPD   Vaping Use    Vaping Use: Never used   Substance and Sexual Activity    Alcohol use: Not Currently     Comment: not since 2023    Drug use: Not Currently     Types: Cannabis     Comment: rare       Available pre-op labs reviewed.  Lab Results   Component Value Date    WBC 5.4 2024    RBC 3.77 (L) 2024    HGB 12.1 (L) 2024    HCT 35.2 (L) 2024    MCV 93.4 2024    MCH 32.1 2024    MCHC 34.4 2024    RDW 12.3 2024    .0 2024     Lab Results   Component Value Date     2024    K 3.9 2024     2024    CO2 27.0 2024    BUN <5 (L) 2024    CREATSERUM 0.59 (L) 2024    GLU 80 2024    CA 8.5 (L) 2024     Lab Results   Component Value Date    INR 0.98 2024       Vital Signs:  Body mass index is 19.13 kg/m².   height is  1.727 m (5' 8\") and weight is 57.1 kg (125 lb 12.8 oz). His temporal temperature is 97.8 °F (36.6 °C). His blood pressure is 120/89 and his pulse is 76. His respiration is 19 and oxygen saturation is 99%.   Vitals:    03/03/24 1943 03/04/24 0320 03/04/24 0815 03/04/24 1105   BP: 122/85 120/82 (!) 133/99 120/89   Pulse:   79 76   Resp: 18 16 16 19   Temp: 97.6 °F (36.4 °C) 97.3 °F (36.3 °C) 97.8 °F (36.6 °C)    TempSrc: Temporal Oral Temporal    SpO2: 99% 98% 98% 99%   Weight:       Height:            Anesthesia Evaluation     Patient summary reviewed and Nursing notes reviewed    Airway   Mallampati: I  Dental      Pulmonary - negative ROS and normal exam   Cardiovascular - normal exam  Exercise tolerance: good    NYHA Classification: I    Neuro/Psych - negative ROS     GI/Hepatic/Renal - negative ROS     Endo/Other - negative ROS   Abdominal                  Anesthesia Plan:   ASA:  3  Plan:   MAC  Post-op Pain Management: IV analgesics      I have informed Jarrett Bal and/or legal guardian or family member of the nature of the anesthetic plan, benefits, risks including possible dental damage if relevant, major complications, and any alternative forms of anesthetic management.   All of the patient's questions were answered to the best of my ability. The patient desires the anesthetic management as planned.  MAEVE SAUCEDA MD  3/4/2024 11:35 AM  Present on Admission:  **None**

## 2024-10-07 NOTE — NURSING
Pt resting in bed, reading book. Pt denies pain or distress.  Diet tolerated well.  IV's flushed and saline locked.  Commode at the bedside for use, pt able to transfer with assist x1.  No acute distress noted, pt free from falls or injury.  Bed in low position, wheels locked, call light in reach for assistance, will continue to monitor.    [facility]  Nurses Note -- 4 Eyes      10/6/2024       Skin assessed on: Q Shift      [x] No Pressure Injuries Present    [x]Prevention Measures Documented    [] Yes LDA  for Pressure Injury Previously documented     [] Yes New Pressure Injury Discovered   [] LDA for New Pressure Injury Added      Attending RN:  Junie Sr RN     Second RN:  TYLER Mcmahan

## 2024-10-07 NOTE — PT/OT/SLP PROGRESS
Physical Therapy Treatment    Patient Name:  Hannah Montoya   MRN:  [id number]    Recommendations:     Discharge Recommendations: Low Intensity Therapy  Discharge Equipment Recommendations: none  Barriers to discharge: None    Assessment:     Hannah Montoya is a 69 y.o. female admitted with a medical diagnosis of Acute colitis.  She presents with the following impairments/functional limitations: weakness, impaired endurance, impaired functional mobility, gait instability, decreased lower extremity function, decreased ROM, pain, decreased safety awareness, edema .    Rehab Prognosis: Good and Fair; patient would benefit from acute skilled PT services to address these deficits and reach maximum level of function.    Recent Surgery: Procedure(s) (LRB):  EGD (ESOPHAGOGASTRODUODENOSCOPY) (N/A)  COLONOSCOPY (N/A) 4 Days Post-Op    Plan:     During this hospitalization, patient to be seen 5 x/week to address the identified rehab impairments via gait training, therapeutic activities, therapeutic exercises, neuromuscular re-education and progress toward the following goals:    Plan of Care Expires:       Subjective     Chief Complaint: upset that she is still in the hospital   Patient/Family Comments/goals: pt is agreeable to BLE HEP education .   Pain/Comfort:  Pain Rating 1: 0/10  Pain Rating Post-Intervention 1: 0/10      Objective:     Communicated with nurse  prior to session.  Patient found up in chair with telemetry, peripheral IV upon PT entry to room.     General Precautions: Standard, fall  Orthopedic Precautions: N/A  Braces: N/A  Respiratory Status: Room air       AM-PAC 6 CLICK MOBILITY  Turning over in bed (including adjusting bedclothes, sheets and blankets)?: 4  Sitting down on and standing up from a chair with arms (e.g., wheelchair, bedside commode, etc.): 4  Moving from lying on back to sitting on the side of the bed?: 4  Moving to and from a bed to a chair (including a wheelchair)?: 3  Need  to walk in hospital room?: 3  Climbing 3-5 steps with a railing?: 3  Basic Mobility Total Score: 21       Treatment & Education:  BLE HEP given with instructions and demonstrations (pt verbalized understanding). Encouraged pt to perform BLE ex's per handout throughout the day.   Educated pt on energy conservation techniques and pursed lip breathing technique, handouts provided .   Patient left up in reclined chair with BLE elevated , heels offloading  all lines intact, call button in reach, and nurse  notified..    GOALS:   Multidisciplinary Problems       Physical Therapy Goals          Problem: Physical Therapy    Goal Priority Disciplines Outcome Interventions   Physical Therapy Goal     PT, PT/OT Progressing    Description: Goals to be met by: 10/4/2024     Patient will increase functional independence with mobility by performin. Gait  x 150 feet with Contact Guard Assistance using Rolling Walker.                          Time Tracking:     PT Received On: 10/07/24  PT Start Time: 955     PT Stop Time: 1004  PT Total Time (min): 9 min     Billable Minutes: Therapeutic Activity 9    Treatment Type: Treatment  PT/PTA: PTA     Number of PTA visits since last PT visit: 2     10/07/2024

## 2024-10-07 NOTE — PROGRESS NOTES
[facility] Progress Note    Patient Name: Hannah Montoya  MRN: [id number]  Admission Date: 10/1/2024  Hospital Length of Stay: 5 days  Code Status: Full Code   Attending Provider: Jennifer Perdomo MD   Consulting Provider: Jazmine Ng NP  Primary Care Physician: Emanuel Chavez MD  Principal Problem:Acute colitis    Initial History of Present Illness (HPI):  This is a 69 y.o. 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 PMH tage IV NSCLC with pathologic fracture (follows w Fabrice), Non-Insulin-dependent DM2 without complications, HTN, HLD, and Depression. Patient complaint of acute onset of persistent diarrhea with associated symtpoms of dizziness, n/v and abdominal cramping that began 7-10 days after receiving opdivo on Sept 12th. Denies hematemesis, BRBPR, melena, or constipation. Denies sick contacts. Reports the upwards of 10x episodes of watery diarrhea daily.      Interval Hx  Reports 1 BM this morning. ~2-3 loose stools on yesterday.     Prior Endoscopy:    EGD- Sloughing esophagitis with no bleeding. Biopsied.                          - Normal stomach.                          - Normal examined duodenum. Biopsied.      Colonoscopy- Multiple scattered non-bleeding erosions/superficial ulcers and        surrounding bowel edema was found in the cecum and ascending colon.        Biopsies were taken with a cold forceps for histology.        Multiple scattered non-bleeding erosions were found in the rectum.        Biopsies were taken with a cold forceps for histology.        A 6 mm polyp was found in the ascending colon. The polyp was        sessile. Polypectomy was not attempted due to the presence of        surrounding colitis.     Imaging:  Imaging: 10/1 CT abdomen pelvis w/IV and oral contrast- No acute intra-abdominal abnormalities identified.   Persistent right lower lobe collapse and consolidation with small right pleural effusion. Postsurgical changes  and additional findings as detailed above.     Medical History:  has a past medical history of AICD (automatic cardioverter/defibrillator) present, Asthma, Breast cancer (), Cardiac pacemaker, Cardiomyopathy, COPD (chronic obstructive pulmonary disease), Diabetes mellitus, type 2, Hyperglycemia, Hyperlipidemia, Hypertension, Malignant neoplasm of overlapping sites of female breast (2016), Nuclear sclerosis of both eyes (2020), and Respiratory distress (3/12/2020).    Surgical History:  has a past surgical history that includes Cholecystectomy;  section; Tubal ligation; Breast biopsy (Right, 2016); Breast lumpectomy (Right); Revision of implantable cardioverter-defibrillator (ICD) electrode lead placement (N/A, 6/15/2018); Cardiac catheterization (Bilateral, 2017); Cardiac defibrillator placement (Left, 08/10/2017); Cardiac defibrillator placement (Left, 2018); Lung biopsy (N/A, 2020); Navigational bronchoscopy (N/A, 10/13/2020); Robot-assisted laparoscopic lymphadenectomy using da Temi Xi (Right, 10/23/2020); Insertion of tunneled central venous catheter (CVC) with subcutaneous port (Right, 2020); biopsy, with ct guidance (Right, 2023); Intramedullary rodding of femur (Left, 2024); Femur biopsy (Left, 2024); Esophagogastroduodenoscopy (N/A, 10/3/2024); and Colonoscopy (N/A, 10/3/2024).    Family History: family history includes Anxiety disorder in her daughter and son; Cataracts in her father and mother; Clotting disorder in her brother; Kidney disease in her father and mother; Macular degeneration in her maternal grandmother; No Known Problems in her maternal aunt, maternal grandfather, maternal uncle, paternal aunt, paternal grandfather, paternal grandmother, paternal uncle, and sister..     Social History:  reports that she quit smoking about 8 years ago. Her smoking use included cigarettes. She started smoking about 48 years ago. She has a 20  pack-year smoking history. She has been exposed to tobacco smoke. She has never used smokeless tobacco. She reports current alcohol use of about 1.0 standard drink of alcohol per week. She reports that she does not use drugs.    Current Facility-Administered Medications on File Prior to Encounter   Medication Dose Route Frequency Provider Last Rate Last Admin    fentaNYL injection 25 mcg  25 mcg Intravenous Q5 Min PRKeesha Mirza MD        haloperidol lactate injection 0.5 mg  0.5 mg Intravenous Once PRKeesha Mirza MD        HYDROmorphone injection 0.2 mg  0.2 mg Intravenous Q5 Min PRKeesha Mirza MD        ondansetron injection 4 mg  4 mg Intravenous Once PRKeesha Mirza MD        sodium chloride 0.9% flush 10 mL  10 mL Intravenous PRKeesha Mirza MD         Current Outpatient Medications on File Prior to Encounter   Medication Sig Dispense Refill    ACCU-CHEK ALFRED PLUS TEST STRP Strp USE TO TEST THREE TIMES DAILY 200 strip 3    acetaminophen (TYLENOL) 500 MG tablet Take 2 tablets (1,000 mg total) by mouth every 8 (eight) hours as needed for Pain.      albuterol (ACCUNEB) 1.25 mg/3 mL Nebu use 1 AMPULE (3ml) via NEBULIZER EVERY 6 HOURS AS NEEDED 300 mL 3    albuterol (VENTOLIN HFA) 90 mcg/actuation inhaler Inhale 2 puffs into the lungs every 4 (four) hours as needed for Wheezing or Shortness of Breath. Rescue 18 g 4    amLODIPine (NORVASC) 5 MG tablet TAKE 1 TABLET BY MOUTH EVERY DAY 90 tablet 2    atorvastatin (LIPITOR) 10 MG tablet TAKE 1 TABLET BY MOUTH EVERY DAY 90 tablet 1    benzonatate (TESSALON) 200 MG capsule Take 1 capsule (200 mg total) by mouth 3 (three) times daily as needed for Cough. 30 capsule 1    buPROPion (WELLBUTRIN XL) 150 MG TB24 tablet TAKE 1 TABLET BY MOUTH EVERY DAY 90 tablet 3    cetirizine (ZYRTEC) 10 MG tablet Take 10 mg by mouth every evening.       cholecalciferol, vitamin D3, (VITAMIN D3) 50 mcg (2,000 unit) Tab Take 1 tablet (2,000 Units total) by mouth once daily. 30 tablet 11     gabapentin (NEURONTIN) 300 MG capsule Take 1 capsule (300 mg total) by mouth 3 (three) times daily. 90 capsule 11    losartan (COZAAR) 100 MG tablet TAKE 1 TABLET BY MOUTH EVERY DAY 90 tablet 3    meloxicam (MOBIC) 7.5 MG tablet Take 1 tablet (7.5 mg total) by mouth once daily. 30 tablet 1    metFORMIN (GLUCOPHAGE) 500 MG tablet TAKE 2 TABLETS BY MOUTH TWICE A DAY WITH MEALS 360 tablet 3    methocarbamoL (ROBAXIN) 750 MG Tab Take 1 tablet (750 mg total) by mouth 4 (four) times daily. for 10 days 40 tablet 0    metoprolol succinate (TOPROL-XL) 100 MG 24 hr tablet TAKE 1 TABLET BY MOUTH EVERY DAY 90 tablet 3    multivitamin (THERAGRAN) per tablet Take 1 tablet by mouth once daily.      nystatin (MYCOSTATIN) powder Apply topically 2 (two) times daily. 60 g 1    ondansetron (ZOFRAN-ODT) 8 MG TbDL Take 1 tablet (8 mg total) by mouth every 8 (eight) hours as needed (nausea/vomiting). Take 1 tablet (8 mg) by mouth every 8 hours as needed for nausea/vomiting. 60 tablet 5    oxyCODONE (ROXICODONE) 5 MG immediate release tablet Take 1 tablet (5 mg total) by mouth every 6 (six) hours as needed for Pain. 28 tablet 0    palonosetron (ALOXI) 0.25 mg/5 mL injection       pantoprazole (PROTONIX) 40 MG tablet TAKE 1 TABLET BY MOUTH EVERY DAY 90 tablet 3    READI-CAT 2 2 % (w/v) suspension       READI-CAT 2 2.1 % (w/v), 2.0 % (w/w) suspension       sertraline (ZOLOFT) 100 MG tablet TAKE 1 TABLET BY MOUTH EVERY DAY IN THE EVENING 90 tablet 2    tiotropium (SPIRIVA WITH HANDIHALER) 18 mcg inhalation capsule INHALE THE CONTENTS OF 1 CAPSULE BY MOUTH EVERY DAY 90 capsule 3    WIXELA INHUB 250-50 mcg/dose diskus inhaler INHALE 1 PUFF INTO THE LUNGS 2 (TWO) TIMES DAILY. CONTROLLER 180 each 3       Review of patient's allergies indicates:   Allergen Reactions    Taxol [paclitaxel]      Hypersensitivity reaction to taxol, symptoms included shortness of breath, nausea, dizziness, flushing     Carboplatin Other (See Comments)     Itching and  "hives    Adhesive Rash     tegaderm burns and blistered skin          Objective Findings:    Vital Signs:  /66 (BP Location: Right arm, Patient Position: Sitting)   Pulse 86   Temp 97.5 °F (36.4 °C) (Oral)   Resp 18   Ht 5' 2" (1.575 m)   Wt 87.6 kg (193 lb 2 oz)   LMP  (LMP Unknown)   SpO2 97%   Breastfeeding No   BMI 35.32 kg/m²   Body mass index is 35.32 kg/m².    Physical Exam:  General Appearance: Well appearing in no acute distress  Head: Normocephalic, without obvious abnormality  Eyes: No scleral icterus    Lungs: Non-labored breathing  Abdomen: Soft, non tender, non distended     Laboratory:    MELD 3.0: 11 at 8/21/2024  3:25 AM  MELD-Na: 6 at 8/21/2024  3:25 AM  Calculated from:  Serum Creatinine: 0.9 mg/dL (Using min of 1 mg/dL) at 8/21/2024  3:25 AM  Serum Sodium: 135 mmol/L at 8/21/2024  3:25 AM  Total Bilirubin: 0.3 mg/dL (Using min of 1 mg/dL) at 8/21/2024  3:25 AM  Serum Albumin: 2.6 g/dL at 8/21/2024  3:25 AM  INR(ratio): 1 at 8/19/2024  7:04 PM  Age at listing (hypothetical): 68 years  Sex: Female at 8/21/2024  3:25 AM      Recent Labs   Lab 10/05/24  0548 10/06/24  0515 10/07/24  0627   HGB 8.3* 8.2* 8.2*       Lab Results   Component Value Date    WBC 6.04 10/07/2024    HGB 8.2 (L) 10/07/2024    HCT 28.9 (L) 10/07/2024    MCV 78 (L) 10/07/2024     10/07/2024       Lab Results   Component Value Date     10/07/2024    K 3.6 10/07/2024     (H) 10/07/2024    CO2 16 (L) 10/07/2024    BUN 12 10/07/2024    CREATININE 0.8 10/07/2024    CALCIUM 8.7 10/07/2024    ANIONGAP 5 (L) 10/07/2024    ESTGFRAFRICA >60.0 06/30/2022    EGFRNONAA 59.7 (A) 06/30/2022       Lab Results   Component Value Date    ALT 9 (L) 10/07/2024    AST 9 (L) 10/07/2024    ALKPHOS 79 10/07/2024    BILITOT 0.2 10/07/2024       Lab Results   Component Value Date    INR 1.0 08/19/2024    INR 1.0 08/19/2024    INR 1.0 11/10/2023       Assessment:  Checkpoint inhibitor colitis       Recommendations:  - " Continue prednisone 80 mg daily started on 10/3 , plan to taper down 10 mg weekly. Awaiting pathology results from EGD and colonoscopy to rule out any additional infectious etiology  - Monitor for hyperglycemia.  - f/u in IBD clinic Nov 20th  -ok to d/c from GI standpoint    Will sign off  Thank you for involving us in the care of Hannah Montoya. Please call with any additional questions, concerns or changes in the patient's clinical status. We will continue to follow.     Jazmine Ng NP  [facility]

## 2024-10-07 NOTE — PT/OT/SLP PROGRESS
Occupational Therapy      Patient Name:  Hannah Montoya   MRN:  [id number]    Patient not seen today secondary to  (Pt deferring OT treatment this date despite encouragement 2/2 wanting to return home and needing to reschedule arrangements). Will follow-up later as able.    10/7/2024

## 2024-10-07 NOTE — DISCHARGE SUMMARY
[facility] Medicine  Discharge Summary      Patient Name: Hannah Montoya  MRN: [id number]  Patient Class: IP- Inpatient  Admission Date: 10/1/2024  Hospital Length of Stay: 5 days  Discharge Date and Time:  10/07/2024 12:36 PM  Attending Physician: Jennifer Perdomo MD   Discharging Provider: Jennifer Perdomo MD  Primary Care Provider: Emanuel Chavez MD      HPI:   Hannah Montoya is a 69 y.o. female with  stage IV NSCLC with pathologic fracture (follows everardo Avina), Non-Insulin-dependent DM2 without complications, HTN, HLD, and Depression  who presented to [facility] ED on 10/02/2024 for eval and treatment of nausea and vomiting associated with diffuse abdominal pain.  She started treatment with Opdivo on September 12th and reports feeling well up until a few days prior to presentation: started feeling malaise, nausea, and then diarrhea.  The diarrhea has worsened over the past 2 days such that she has had over 12 episodes in the last 24 hrs.  Described as watery, non-bloody, and foul-smelling.  Daughter reports she is now too weak to get out of bed.  Pt reports having started using Depends, but has soaked through her most recent pad.  There is associated general abdominal cramping.  They deny associated fever, chills, dyspnea, CP, palpitations.  Symptoms are worsened / alleviated by nothing.    ED course notable for the following:  Patient uncomfortable and appears tired.  Afebrile, HDS.  Exam: pt is pale, and has diffuse abdominal tenderness to palpation but no guarding or rebound tenderness.  Labs WBC 6.91 HGB 9.2 .  Na 134 K 4.5 BUN 27 CRT 1.1 albumin 2.3.  CT A/P w con: The visualized loops of small and large bowel show no evidence of obstruction or inflammation.   ED therapy/stabilization measures:  IV fluids and Zofran.  Heme Onc and GI made aware.  They were placed in observation under the care of [facility] for further evaluation and  treatment.     Procedure(s) (LRB):  EGD (ESOPHAGOGASTRODUODENOSCOPY) (N/A)  COLONOSCOPY (N/A)      Hospital Course:   Hannah Montoya 69 y.o. female placed in observation for diarrhea.  She presented with profuse watery diarrhea in the setting of chemotherapy use.  She was without evidence of sepsis.  CT scan without acute intra-abdominal abnormalities.  She was seen by GI with plans for colonoscopy.  Underwent EGD and colonoscopy with evidence of erosions requiring biopsies.  Her magnesium and phosphorus remain low secondary to diarrhea and decreased p.o. intake which were both replaced.  Her QuantiFERON gold turned to indeterminate and was checked as a screening test plaque beginning possible infliximab should she have checkpoint induced mediated colitis.  PPD placed for confirmation. She was started on high dose steroids for colonoscopy/EGD findings. Pt was cleared to DC by GI. Steroid taper given. OP FU with IBD clinic.      Goals of Care Treatment Preferences:  Code Status: Full Code    Health care agent: [facility] agent number: [phone]    Living Will: Yes              Consults:   Consults (From admission, onward)          Status Ordering Provider     Inpatient virtual consult to Hospital Medicine  Once        Provider:  Zeke Canales MD    Completed ZAIDA ECHOLS     Inpatient consult to Social Work  Once        Provider:  (Not yet assigned)    Completed ZAIDA ECHOLS     Inpatient consult to Social Work  Once        Provider:  (Not yet assigned)    Completed ZAIDA ECHOLS     Inpatient consult to Telemedicine - Oncology  Once        Provider:  Eulalia Rocha MD    Completed EULALIA ROCHA     Inpatient consult to Gastroenterology  Once        Provider:  Jazmine Ng NP    Completed [place] GALO            No new Assessment & Plan notes have been filed under this hospital service since the last note was generated.  Service: Hospital Medicine    Final Active  Diagnoses:    Diagnosis Date Noted POA    PRINCIPAL PROBLEM:  Acute colitis [K52.9] 10/01/2024 Yes    Diarrhea [R19.7] 10/02/2024 Yes    Anemia [D64.9] 08/21/2024 Yes    Hypomagnesemia [E83.42] 08/20/2024 Yes    Lytic bone lesion of left femur s/p IM nail on 8/20/2024 [M89.9] 08/19/2024 Yes    Moderate episode of recurrent major depressive disorder [F33.1] 06/03/2023 Yes    Class 2 severe obesity due to excess calories with serious comorbidity in adult [E66.812, E66.01]  Yes    NSCLC of right lung [C34.91]  Yes    NICM (nonischemic cardiomyopathy) [I42.8] 05/02/2018 Yes    Type 2 diabetes mellitus without complication [E11.9] 07/05/2016 Yes    Essential hypertension [I10] 10/07/2014 Yes      Problems Resolved During this Admission:    Diagnosis Date Noted Date Resolved POA    Antineoplastic chemotherapy induced anemia [D64.81, T45.1X5A] 10/02/2024 10/05/2024 Unknown       Discharged Condition: stable    Disposition: Home or Self Care    Follow Up:    Patient Instructions:      Diet Cardiac     Notify your health care provider if you experience any of the following:  temperature >100.4     Notify your health care provider if you experience any of the following:  persistent nausea and vomiting or diarrhea     Notify your health care provider if you experience any of the following:  severe uncontrolled pain     Notify your health care provider if you experience any of the following:  redness, tenderness, or signs of infection (pain, swelling, redness, odor or green/yellow discharge around incision site)     Notify your health care provider if you experience any of the following:  difficulty breathing or increased cough     Notify your health care provider if you experience any of the following:  severe persistent headache     Notify your health care provider if you experience any of the following:  worsening rash     Notify your health care provider if you experience any of the following:  persistent dizziness,  light-headedness, or visual disturbances     Notify your health care provider if you experience any of the following:  increased confusion or weakness     Send follow up & questions to   Order Comments: Patient's primary care physician: Emanuel Chavez MD     Activity as tolerated       Significant Diagnostic Studies: Labs: CMP   Recent Labs   Lab 10/06/24  0516 10/07/24  0627    137   K 3.3* 3.6   * 116*   CO2 19* 16*    109   BUN 10 12   CREATININE 0.7 0.8   CALCIUM 8.6* 8.7   PROT 5.2* 5.1*   ALBUMIN 2.0* 2.0*   BILITOT 0.2 0.2   ALKPHOS 76 79   AST 10 9*   ALT 9* 9*   ANIONGAP 8 5*    and CBC   Recent Labs   Lab 10/06/24  0515 10/07/24  0627   WBC 5.80 6.04   HGB 8.2* 8.2*   HCT 29.0* 28.9*    298       Pending Diagnostic Studies:       Procedure Component Value Units Date/Time    Pancreatic elastase, fecal [[id number]] Collected: 10/02/24 1859    Order Status: Sent Lab Status: In process Updated: 10/1957    Specimen: Stool     Specimen to Pathology, Surgery Gastrointestinal tract [[id number]] Collected: 10/03/24 1421    Order Status: Sent Lab Status: In process Updated: 10/04/24 1002    Specimen: Tissue            Medications:  Reconciled Home Medications:      Medication List        START taking these medications      predniSONE 20 MG tablet  Commonly known as: DELTASONE  Take 4 tablets (80 mg total) by mouth once daily for 3 days, THEN 3.5 tablets (70 mg total) once daily for 7 days, THEN 3 tablets (60 mg total) once daily for 7 days, THEN 2.5 tablets (50 mg total) once daily for 7 days, THEN 2 tablets (40 mg total) once daily for 7 days, THEN 1.5 tablets (30 mg total) once daily for 7 days, THEN 1 tablet (20 mg total) once daily for 7 days, THEN 0.5 tablets (10 mg total) once daily for 7 days.  Start taking on: October 8, 2024            CONTINUE taking these medications      ACCU-CHEK ALFRED PLUS TEST STRP Strp  Generic drug: blood sugar diagnostic  USE TO TEST THREE TIMES  DAILY     acetaminophen 500 MG tablet  Commonly known as: TYLENOL  Take 2 tablets (1,000 mg total) by mouth every 8 (eight) hours as needed for Pain.     * albuterol 1.25 mg/3 mL Nebu  Commonly known as: ACCUNEB  use 1 AMPULE (3ml) via NEBULIZER EVERY 6 HOURS AS NEEDED     * albuterol 90 mcg/actuation inhaler  Commonly known as: VENTOLIN HFA  Inhale 2 puffs into the lungs every 4 (four) hours as needed for Wheezing or Shortness of Breath. Rescue     amLODIPine 5 MG tablet  Commonly known as: NORVASC  TAKE 1 TABLET BY MOUTH EVERY DAY     atorvastatin 10 MG tablet  Commonly known as: LIPITOR  TAKE 1 TABLET BY MOUTH EVERY DAY     benzonatate 200 MG capsule  Commonly known as: TESSALON  Take 1 capsule (200 mg total) by mouth 3 (three) times daily as needed for Cough.     buPROPion 150 MG TB24 tablet  Commonly known as: WELLBUTRIN XL  TAKE 1 TABLET BY MOUTH EVERY DAY     cetirizine 10 MG tablet  Commonly known as: ZYRTEC  Take 10 mg by mouth every evening.     cholecalciferol (vitamin D3) 50 mcg (2,000 unit) Tab  Commonly known as: VITAMIN D3  Take 1 tablet (2,000 Units total) by mouth once daily.     gabapentin 300 MG capsule  Commonly known as: NEURONTIN  Take 1 capsule (300 mg total) by mouth 3 (three) times daily.     losartan 100 MG tablet  Commonly known as: COZAAR  TAKE 1 TABLET BY MOUTH EVERY DAY     meloxicam 7.5 MG tablet  Commonly known as: MOBIC  Take 1 tablet (7.5 mg total) by mouth once daily.     metFORMIN 500 MG tablet  Commonly known as: GLUCOPHAGE  TAKE 2 TABLETS BY MOUTH TWICE A DAY WITH MEALS     methocarbamoL 750 MG Tab  Commonly known as: ROBAXIN  Take 1 tablet (750 mg total) by mouth 4 (four) times daily. for 10 days     metoprolol succinate 100 MG 24 hr tablet  Commonly known as: TOPROL-XL  TAKE 1 TABLET BY MOUTH EVERY DAY     multivitamin per tablet  Commonly known as: THERAGRAN  Take 1 tablet by mouth once daily.     nystatin powder  Commonly known as: MYCOSTATIN  Apply topically 2 (two) times  daily.     ondansetron 8 MG Tbdl  Commonly known as: ZOFRAN-ODT  Take 1 tablet (8 mg total) by mouth every 8 (eight) hours as needed (nausea/vomiting). Take 1 tablet (8 mg) by mouth every 8 hours as needed for nausea/vomiting.     oxyCODONE 5 MG immediate release tablet  Commonly known as: ROXICODONE  Take 1 tablet (5 mg total) by mouth every 6 (six) hours as needed for Pain.     palonosetron 0.25 mg/5 mL injection  Commonly known as: ALOXI     pantoprazole 40 MG tablet  Commonly known as: PROTONIX  TAKE 1 TABLET BY MOUTH EVERY DAY     * READI-CAT 2 2 % (w/v) suspension  Generic drug: barium sulfate     * READI-CAT 2 2.1 % (w/v), 2.0 % (w/w) suspension  Generic drug: barium     sertraline 100 MG tablet  Commonly known as: ZOLOFT  TAKE 1 TABLET BY MOUTH EVERY DAY IN THE EVENING     tiotropium 18 mcg inhalation capsule  Commonly known as: SPIRIVA WITH HANDIHALER  INHALE THE CONTENTS OF 1 CAPSULE BY MOUTH EVERY DAY     WIXELA INHUB 250-50 mcg/dose diskus inhaler  Generic drug: fluticasone-salmeterol 250-50 mcg/dose  INHALE 1 PUFF INTO THE LUNGS 2 (TWO) TIMES DAILY. CONTROLLER           * This list has 4 medication(s) that are the same as other medications prescribed for you. Read the directions carefully, and ask your doctor or other care provider to review them with you.                  Indwelling Lines/Drains at time of discharge:   Lines/Drains/Airways       Drain  Duration             Female External Urinary Catheter w/ Suction 10/03/24 1711 3 days                    Time spent on the discharge of patient: 39 minutes         The attending portion of this evaluation, treatment, and documentation was performed per Jennifer Perdomo MD via Telemedicine AudioVisual using the secure [employer] software platform with 2 way audio/video. The provider was located off-site and the patient is located in the hospital. The aforementioned video software was utilized to document the relevant history and physical exam    Jennifer Perdomo  MD  Department of Hospital Medicine  [facility] - [place], [place] - Med Surg

## 2024-10-07 NOTE — PLAN OF CARE
10/07/24 1424   Medicare Message   Important Message from Medicare regarding Discharge Appeal Rights Given to patient/caregiver;Explained to patient/caregiver;Other (comments)  (Spoke with patient's daugther via telephone. Explained IMM and she verbalized understanding; PaulinoElizabeth (Daughter)  [phone])   Date IMM was signed 10/07/24   Time IMM was signed [id number]

## 2024-10-07 NOTE — PLAN OF CARE
Case Management Final Discharge Note      Discharge Disposition: Home health with [facility]: [phone]      New DME ordered / company name: None    Relevant SDOH / Transition of Care Barriers:  None    Primary Caretaker and contact information: Daughter    Scheduled followup appointment: Follow up appointments with PCP and Gastroenterology scheduled and added to patient AVS    Referrals placed: None    Transportation: Private vehicle/family taking patient home        Patient and family educated on discharge services and updated on DC plan.  Patient to discharge with home health services from [facility]; pt was receiving home health services with agency prior to admission and will resume services upon discharge.  Bedside RN notified, patient clear to discharge from Case Management Perspective.    10/07/24 1428   Final Note   Assessment Type Final Discharge Note   Anticipated Discharge Disposition Home-Health   Hospital Resources/Appts/Education Provided Provided patient/caregiver with written discharge plan information;Appointments scheduled and added to AVS;Community resources provided   Post-Acute Status   Post-Acute Authorization Home Health   Home Health Status Set-up Complete/Auth obtained   Coverage [employer] MANAGED MEDICARE - [employer] MEDICARE [employer] - CAPITATED   Discharge Delays None known at this time

## 2024-10-08 LAB — ELASTASE 1, FECAL: <40 MCG/G

## 2024-10-09 ENCOUNTER — PATIENT MESSAGE (OUTPATIENT)
Dept: HEMATOLOGY/ONCOLOGY | Facility: CLINIC | Age: 69
End: 2024-10-09
Payer: MEDICARE

## 2024-10-09 ENCOUNTER — TELEPHONE (OUTPATIENT)
Dept: ELECTROPHYSIOLOGY | Facility: CLINIC | Age: 69
End: 2024-10-09
Payer: MEDICARE

## 2024-10-09 NOTE — TELEPHONE ENCOUNTER
Returned call to patient. I informed her that since she was in the hospital with colitis, we cancelled her procedure for 10/10/24. I told her we would look at Dr. Gates's schedule and call her back with a reschedule date. Patient verbalized understanding.

## 2024-10-09 NOTE — TELEPHONE ENCOUNTER
----- Message from Med Assistant [place] sent at 10/9/2024  1:36 PM CDT -----  Regarding: FW: Patient advice  Contact: Pt  [phone]    ----- Message -----  From: Tish Fofana  Sent: 10/9/2024   9:51 AM CDT  To: [facility] Staff  Subject: Patient advice                                           Name of Caller: Hannah      Contact Preference: [phone]    Nature of Call:  Requesting a call back to get arrival time of procedure scheduled for  10/10/24

## 2024-10-10 ENCOUNTER — PATIENT MESSAGE (OUTPATIENT)
Dept: FAMILY MEDICINE | Facility: CLINIC | Age: 69
End: 2024-10-10
Payer: MEDICARE

## 2024-10-10 ENCOUNTER — PATIENT OUTREACH (OUTPATIENT)
Dept: ADMINISTRATIVE | Facility: CLINIC | Age: 69
End: 2024-10-10
Payer: MEDICARE

## 2024-10-10 NOTE — PROGRESS NOTES
C3 nurse spoke with Hannah Montoya for a TCC Post Discharge follow-up call. The patient has a scheduled \[facility] [place] [facility]\""FU appointment with the [facility] provider, Deja Sahu NP., on 10/16/2024. The patient was explained that the NP will call you to provide a time-frame for the appointment.

## 2024-10-14 ENCOUNTER — PATIENT MESSAGE (OUTPATIENT)
Dept: ELECTROPHYSIOLOGY | Facility: CLINIC | Age: 69
End: 2024-10-14
Payer: MEDICARE

## 2024-10-14 ENCOUNTER — TELEPHONE (OUTPATIENT)
Dept: ELECTROPHYSIOLOGY | Facility: CLINIC | Age: 69
End: 2024-10-14
Payer: MEDICARE

## 2024-10-14 LAB
FINAL PATHOLOGIC DIAGNOSIS: NORMAL
GROSS: NORMAL
Lab: NORMAL

## 2024-10-14 RX ORDER — ACETAMINOPHEN 325 MG/1
650 TABLET ORAL ONCE
OUTPATIENT
Start: 2024-10-14 | End: 2024-10-14

## 2024-10-14 RX ORDER — IPRATROPIUM BROMIDE AND ALBUTEROL SULFATE 2.5; .5 MG/3ML; MG/3ML
3 SOLUTION RESPIRATORY (INHALATION)
OUTPATIENT
Start: 2024-10-14

## 2024-10-14 RX ORDER — CETIRIZINE HYDROCHLORIDE 10 MG/1
10 TABLET ORAL ONCE
OUTPATIENT
Start: 2024-10-14 | End: 2024-10-14

## 2024-10-14 RX ORDER — METHYLPREDNISOLONE SOD SUCC 125 MG
40 VIAL (EA) INJECTION
OUTPATIENT
Start: 2024-10-14

## 2024-10-14 RX ORDER — DIPHENHYDRAMINE HYDROCHLORIDE 50 MG/ML
25 INJECTION INTRAMUSCULAR; INTRAVENOUS
OUTPATIENT
Start: 2024-10-14

## 2024-10-14 RX ORDER — HEPARIN 100 UNIT/ML
500 SYRINGE INTRAVENOUS
OUTPATIENT
Start: 2024-10-14

## 2024-10-14 RX ORDER — ACETAMINOPHEN 325 MG/1
650 TABLET ORAL
OUTPATIENT
Start: 2024-10-14

## 2024-10-14 RX ORDER — SODIUM CHLORIDE 0.9 % (FLUSH) 0.9 %
10 SYRINGE (ML) INJECTION
OUTPATIENT
Start: 2024-10-14

## 2024-10-14 RX ORDER — EPINEPHRINE 1 MG/ML
0.3 INJECTION, SOLUTION, CONCENTRATE INTRAVENOUS
OUTPATIENT
Start: 2024-10-14

## 2024-10-14 NOTE — TELEPHONE ENCOUNTER
Spoke with pt, rescheduled CRT-D gen change with Dr Gates for 11/1, will send updated instructions

## 2024-10-15 ENCOUNTER — PATIENT MESSAGE (OUTPATIENT)
Dept: FAMILY MEDICINE | Facility: CLINIC | Age: 69
End: 2024-10-15
Payer: MEDICARE

## 2024-10-15 DIAGNOSIS — R60.0 BILATERAL LEG EDEMA: Primary | ICD-10-CM

## 2024-10-15 RX ORDER — FUROSEMIDE 40 MG/1
40 TABLET ORAL DAILY
Qty: 7 TABLET | Refills: 0 | Status: SHIPPED | OUTPATIENT
Start: 2024-10-15 | End: 2025-10-15

## 2024-10-15 RX ORDER — POTASSIUM CHLORIDE 750 MG/1
10 CAPSULE, EXTENDED RELEASE ORAL DAILY
Qty: 7 CAPSULE | Refills: 0 | Status: SHIPPED | OUTPATIENT
Start: 2024-10-15 | End: 2024-10-22

## 2024-10-16 ENCOUNTER — PATIENT MESSAGE (OUTPATIENT)
Dept: PSYCHIATRY | Facility: CLINIC | Age: 69
End: 2024-10-16

## 2024-10-16 ENCOUNTER — TELEPHONE (OUTPATIENT)
Dept: HOME HEALTH SERVICES | Facility: CLINIC | Age: 69
End: 2024-10-16

## 2024-10-16 ENCOUNTER — TELEPHONE (OUTPATIENT)
Dept: PSYCHIATRY | Facility: CLINIC | Age: 69
End: 2024-10-16

## 2024-10-16 NOTE — TELEPHONE ENCOUNTER
Attempted to call patient due to lack of patient log-in at scheduled time for video visit. No answer. Detailed rescheduling and contact information not able to be provided by phone as VM did not engage. Rescheduling letter sent by chart message    CATY Alcaraz, PhD

## 2024-10-17 ENCOUNTER — OFFICE VISIT (OUTPATIENT)
Dept: HEMATOLOGY/ONCOLOGY | Facility: CLINIC | Age: 69
End: 2024-10-17
Payer: MEDICARE

## 2024-10-17 DIAGNOSIS — C79.51 MALIGNANT NEOPLASM METASTATIC TO BONE: ICD-10-CM

## 2024-10-17 DIAGNOSIS — I42.0 DILATED CARDIOMYOPATHY: ICD-10-CM

## 2024-10-17 DIAGNOSIS — J70.0 RADIATION PNEUMONITIS: ICD-10-CM

## 2024-10-17 DIAGNOSIS — Z85.3 HISTORY OF BREAST CANCER IN FEMALE: ICD-10-CM

## 2024-10-17 DIAGNOSIS — I44.7 LEFT BUNDLE-BRANCH BLOCK: ICD-10-CM

## 2024-10-17 DIAGNOSIS — K52.9 COLITIS: ICD-10-CM

## 2024-10-17 DIAGNOSIS — C34.91 NSCLC OF RIGHT LUNG: Primary | ICD-10-CM

## 2024-10-17 DIAGNOSIS — E83.52 MALIGNANCY ASSOCIATED HYPERCALCEMIA: ICD-10-CM

## 2024-10-17 DIAGNOSIS — M25.552 LEFT HIP PAIN: ICD-10-CM

## 2024-10-17 DIAGNOSIS — M89.9 LYTIC BONE LESION OF LEFT FEMUR: ICD-10-CM

## 2024-10-17 DIAGNOSIS — Z95.810 CARDIAC RESYNCHRONIZATION THERAPY DEFIBRILLATOR (CRT-D) IN PLACE: ICD-10-CM

## 2024-10-17 NOTE — PROGRESS NOTES
ONCOLOGY FOLLOW UP VISIT.     The patient location is: home  The chief complaint leading to consultation is: Inpatient follow up after colitis from [facility]CI    Visit type: audiovisual    Face to Face time with patient: 20  40 minutes of total time spent on the encounter, which includes face to face time and non-face to face time preparing to see the patient (eg, review of tests), Obtaining and/or reviewing separately obtained history, Documenting clinical information in the electronic or other health record, Independently interpreting results (not separately reported) and communicating results to the patient/family/caregiver, or Care coordination (not separately reported).     Each patient to whom he or she provides medical services by telemedicine is:  (1) informed of the relationship between the physician and patient and the respective role of any other health care provider with respect to management of the patient; and (2) notified that he or she may decline to receive medical services by telemedicine and may withdraw from such care at any time.    Cancer/Stage/TNM:    Cancer Staging   NSCLC of right lung  Staging form: Lung, AJCC 8th Edition  - Clinical stage from 9/29/2020: Stage IIIA (cT3, cN1, cM0) - Signed by Ramo Tucker MD on 10/24/2020  - Clinical stage from 7/31/2024: Stage DEREK (cT3, cN2, cM1a) - Signed by Javi Avina MD on 8/2/2024         Oncology History   NSCLC of right lung   9/29/2020 Cancer Staged    Staging form: Lung, AJCC 8th Edition  - Clinical stage from 9/29/2020: Stage IIIA (cT3, cN1, cM0)     10/14/2020 Initial Diagnosis    NSCLC of right lung     11/17/2020 - 12/30/2020 Radiation Therapy    Treating physician: Harvinder Lara     Site  Technique  Energy  Dose/Fx (Gy)  #Fx  Total Dose (Gy)    RLL Lung  VMAT  6X  2  30 / 30  60         2/6/2023 -  Chemotherapy    Treatment Summary   Plan Name: OP NSCLC NIVOLUMAB 360 MG D1 & D22 WITH IPILIMUMAB 1 MG/KG Q6W PLUS CARBOPLATIN (AUC)  PACLITAXEL Q3W X2 DOSES  Treatment Goal: Control  Status: Active  Start Date: 2/6/2023  End Date: 2/19/2026 (Planned)  Provider: Javi Avina MD  Chemotherapy: CARBOplatin (PARAPLATIN) 525 mg in sodium chloride 0.9% 337.5 mL chemo infusion, 525 mg, Intravenous, Clinic/HOD 1 time, 1 of 1 cycle  Administration: 525 mg (2/6/2023), 490 mg (2/27/2023)  PACLitaxeL (TAXOL) 200 mg/m2 = 396 mg in sodium chloride 0.9% 500 mL chemo infusion, 200 mg/m2 = 396 mg (100 % of original dose 200 mg/m2), Intravenous, Clinic/HOD 1 time, 1 of 1 cycle  Dose modification: 200 mg/m2 (original dose 200 mg/m2, Cycle 1), 200 mg/m2 (original dose 200 mg/m2, Cycle 1)  Administration: 396 mg (2/6/2023), 396 mg (2/27/2023)     6/12/2023 - 6/30/2023 Radiation Therapy    Treating physician: Harvinder Lara    Site Technique Energy Dose/Fx (Gy) #Fx Total Dose (Gy)   RLL Lung RtLung 6X 4 15 / 15 60        7/31/2024 Cancer Staged    Staging form: Lung, AJCC 8th Edition  - Clinical stage from 7/31/2024: Stage DEREK (cT3, cN2, cM1a)          Interval History:   Patient was admitted to [facility] with 5 days of diarrhea. She had a quick scope and stool studies confirming ICI colitis. Initiated on high dose steroids with improvement. She is now on 60 mg on her taper.      ROS:  Review of Systems   Constitutional:  Negative for chills, fever and weight loss.   HENT:  Negative for hearing loss, nosebleeds and sore throat.    Eyes:  Negative for blurred vision and double vision.   Respiratory:  Negative for cough, hemoptysis, shortness of breath and wheezing.    Cardiovascular:  Negative for chest pain and leg swelling.   Gastrointestinal:  Negative for abdominal pain, blood in stool, diarrhea, heartburn, nausea and vomiting.   Genitourinary:  Negative for dysuria, frequency and hematuria.   Musculoskeletal:  Positive for back pain (right sided) and joint pain (L hip pain). Negative for myalgias.   Skin:  Positive for rash (under left breast). Negative  for itching.   Neurological:  Positive for weakness. Negative for dizziness, tingling, sensory change, speech change and headaches.   Endo/Heme/Allergies:  Does not bruise/bleed easily.   Psychiatric/Behavioral:  The patient is not nervous/anxious.           A complete 12-point review of systems was reviewed and is negative except as mentioned above.     Past Medical History:   Past Medical History:   Diagnosis Date    AICD (automatic cardioverter/defibrillator) present     Asthma     bronchitis in past    Breast cancer 2016    right    Cardiac pacemaker     Cardiomyopathy     COPD (chronic obstructive pulmonary disease)     Diabetes mellitus, type 2     Hyperglycemia     Hyperlipidemia     Hypertension     Malignant neoplasm of overlapping sites of female breast 2/12/2016    Nuclear sclerosis of both eyes 8/12/2020    Respiratory distress 3/12/2020        Allergies:   Review of patient's allergies indicates:   Allergen Reactions    Taxol [paclitaxel]      Hypersensitivity reaction to taxol, symptoms included shortness of breath, nausea, dizziness, flushing     Carboplatin Other (See Comments)     Itching and hives    Adhesive Rash     tegaderm burns and blistered skin        Medications:   Current Outpatient Medications   Medication Sig Dispense Refill    ACCU-CHEK ALFRED PLUS TEST STRP Strp USE TO TEST THREE TIMES DAILY 200 strip 3    acetaminophen (TYLENOL) 500 MG tablet Take 2 tablets (1,000 mg total) by mouth every 8 (eight) hours as needed for Pain.      albuterol (ACCUNEB) 1.25 mg/3 mL Nebu use 1 AMPULE (3ml) via NEBULIZER EVERY 6 HOURS AS NEEDED 300 mL 3    albuterol (VENTOLIN HFA) 90 mcg/actuation inhaler Inhale 2 puffs into the lungs every 4 (four) hours as needed for Wheezing or Shortness of Breath. Rescue 18 g 4    amLODIPine (NORVASC) 5 MG tablet TAKE 1 TABLET BY MOUTH EVERY DAY 90 tablet 2    atorvastatin (LIPITOR) 10 MG tablet TAKE 1 TABLET BY MOUTH EVERY DAY 90 tablet 1    benzonatate (TESSALON) 200  MG capsule Take 1 capsule (200 mg total) by mouth 3 (three) times daily as needed for Cough. (Patient not taking: Reported on 10/10/2024) 30 capsule 1    buPROPion (WELLBUTRIN XL) 150 MG TB24 tablet TAKE 1 TABLET BY MOUTH EVERY DAY 90 tablet 3    cetirizine (ZYRTEC) 10 MG tablet Take 10 mg by mouth every evening.       cholecalciferol, vitamin D3, (VITAMIN D3) 50 mcg (2,000 unit) Tab Take 1 tablet (2,000 Units total) by mouth once daily. 30 tablet 11    furosemide (LASIX) 40 MG tablet Take 1 tablet (40 mg total) by mouth once daily. 7 tablet 0    gabapentin (NEURONTIN) 300 MG capsule Take 1 capsule (300 mg total) by mouth 3 (three) times daily. 90 capsule 11    losartan (COZAAR) 100 MG tablet TAKE 1 TABLET BY MOUTH EVERY DAY 90 tablet 3    meloxicam (MOBIC) 7.5 MG tablet Take 1 tablet (7.5 mg total) by mouth once daily. 30 tablet 1    metFORMIN (GLUCOPHAGE) 500 MG tablet TAKE 2 TABLETS BY MOUTH TWICE A DAY WITH MEALS (Patient taking differently: Take 1,000 mg by mouth daily with breakfast.) 360 tablet 3    metoprolol succinate (TOPROL-XL) 100 MG 24 hr tablet TAKE 1 TABLET BY MOUTH EVERY DAY 90 tablet 3    multivitamin (THERAGRAN) per tablet Take 1 tablet by mouth once daily.      nystatin (MYCOSTATIN) powder Apply topically 2 (two) times daily. 60 g 1    ondansetron (ZOFRAN-ODT) 8 MG TbDL Take 1 tablet (8 mg total) by mouth every 8 (eight) hours as needed (nausea/vomiting). Take 1 tablet (8 mg) by mouth every 8 hours as needed for nausea/vomiting. 60 tablet 5    oxyCODONE (ROXICODONE) 5 MG immediate release tablet Take 1 tablet (5 mg total) by mouth every 6 (six) hours as needed for Pain. 28 tablet 0    palonosetron (ALOXI) 0.25 mg/5 mL injection  (Patient not taking: Reported on 10/10/2024)      pantoprazole (PROTONIX) 40 MG tablet TAKE 1 TABLET BY MOUTH EVERY DAY 90 tablet 3    potassium chloride (MICRO-K) 10 MEQ CpSR Take 1 capsule (10 mEq total) by mouth once daily. for 7 doses 7 capsule 0    predniSONE  (DELTASONE) 20 MG tablet Take 4 tablets (80 mg total) by mouth once daily x3 days, THEN 3.5 tablets (70 mg total) once daily x7 days, THEN 3 tablets (60 mg total) once daily x7 days, THEN 2.5 tablets (50 mg total) once daily x7 days, THEN 2 tablets (40 mg total) once daily x7 days, THEN 1.5 tablets (30 mg total) once daily x7 days, THEN 1 tablet (20 mg total) once daily x7 days, THEN 0.5 tablets (10 mg total) once daily x7 days. 110 tablet 0    READI-CAT 2 2 % (w/v) suspension  (Patient not taking: Reported on 10/10/2024)      READI-CAT 2 2.1 % (w/v), 2.0 % (w/w) suspension  (Patient not taking: Reported on 10/10/2024)      sertraline (ZOLOFT) 100 MG tablet TAKE 1 TABLET BY MOUTH EVERY DAY IN THE EVENING 90 tablet 2    tiotropium (SPIRIVA WITH HANDIHALER) 18 mcg inhalation capsule INHALE THE CONTENTS OF 1 CAPSULE BY MOUTH EVERY DAY 90 capsule 3    WIXELA INHUB 250-50 mcg/dose diskus inhaler INHALE 1 PUFF INTO THE LUNGS 2 (TWO) TIMES DAILY. CONTROLLER 180 each 3     No current facility-administered medications for this visit.     Facility-Administered Medications Ordered in Other Visits   Medication Dose Route Frequency Provider Last Rate Last Admin    fentaNYL injection 25 mcg  25 mcg Intravenous Q5 Min PRKeesha Mirza MD        haloperidol lactate injection 0.5 mg  0.5 mg Intravenous Once PRN Keesha Martins MD        HYDROmorphone injection 0.2 mg  0.2 mg Intravenous Q5 Min PRKeesha Mirza MD        ondansetron injection 4 mg  4 mg Intravenous Once PRN Keesha Martins MD        sodium chloride 0.9% flush 10 mL  10 mL Intravenous PRN Keesha Martins MD            Physical Exam:   LMP  (LMP Unknown)      ECOG Performance Status: (foot note - ECOG PS provided by Eastern Cooperative Oncology Group) 0 - Asymptomatic    Physical Exam  Vitals and nursing note reviewed.   Constitutional:       Appearance: Normal appearance. She is well-developed and normal weight.   HENT:      Head: Normocephalic and atraumatic.   Eyes:       Conjunctiva/sclera: Conjunctivae normal.      Pupils: Pupils are equal, round, and reactive to light.   Pulmonary:      Effort: Pulmonary effort is normal. No respiratory distress.   Abdominal:      General: There is no distension.      Palpations: Abdomen is soft.   Musculoskeletal:         General: No swelling. Normal range of motion.      Cervical back: Normal range of motion and neck supple.      Left ankle: Swelling (trace) present.   Lymphadenopathy:      Cervical: No cervical adenopathy.   Skin:     General: Skin is warm and dry.      Findings: No rash.   Neurological:      General: No focal deficit present.      Mental Status: She is alert and oriented to person, place, and time.   Psychiatric:         Mood and Affect: Mood and affect normal.         Behavior: Behavior normal.                 Labs:   No results found for this or any previous visit (from the past 48 hours).               Imaging:   CT Abdomen Pelvis With IV Contrast Routine Oral Contrast  Narrative: EXAMINATION:  CT ABDOMEN PELVIS WITH IV CONTRAST    CLINICAL HISTORY:  Abdominal pain, acute, nonlocalized;    TECHNIQUE:  Low dose axial images, sagittal and coronal reformations were obtained from the lung bases to the pubic symphysis following the IV administration of 75 mL of Omnipaque 350 .  Oral contrast was not given.    COMPARISON:  CT chest abdomen pelvis 09/18/2024.    FINDINGS:  Heart is normal in size.  Pacer wires are partially visualized.  There is persistent right lower lobe collapse/consolidation with small right pleural effusion.  Left lung base is relatively clear.    No significant hepatic abnormalities are identified.  There is no intra-or extrahepatic biliary ductal dilatation.  Gallbladder has been removed.  The stomach, pancreas, and right adrenal gland are unremarkable.  There is mild left adrenal nodular thickening.  Spleen is mildly enlarged measuring 13 cm.    Kidneys enhance normally with no evidence of hydronephrosis.   No abnormalities are seen along the ureteral courses.  Urinary bladder is nondistended.  There is mild lobular configuration of the uterus with suspected small underlying fibroids seen.    Appendix is visualized and is unremarkable.  The visualized loops of small and large bowel show no evidence of obstruction or inflammation.  No free air or free fluid.    Aorta tapers normally.    No acute osseous abnormality identified.  There is lower lumbar facet arthropathy.  Similar appearance of the left hip with associated adjacent subcutaneous soft tissue thickening and stranding.  Postoperative ORIF changes are seen involving the left proximal femur.  Impression: 1. No acute intra-abdominal abnormalities identified.  2. Persistent right lower lobe collapse and consolidation with small right pleural effusion.  3. Postsurgical changes and additional findings as detailed above.    Electronically signed by: Dwaine Clay MD  Date:    10/01/2024  Time:    20:24            Diagnoses:       1. NSCLC of right lung    2. Left hip pain    3. Lytic bone lesion of left femur s/p IM nail on 8/20/2024    4. Colitis    5. Radiation pneumonitis    6. History of breast cancer in female    7. Dilated cardiomyopathy    8. Left bundle-branch block    9. Cardiac resynchronization therapy defibrillator (CRT-D) in place    10. Malignancy associated hypercalcemia    11. Malignant neoplasm metastatic to bone        Assessment and Plan:         1. Recurrent NSCLC  Plan is for definitive chemoRT, will need re-staging scans prior.  Reviewed with patient in detail her diagnosis, stage, treatment options, and prognosis (3 year OS 66% vs. 43.5% without durvalumab) with standard of care chemoRT followed by maintenance immunotherapy.  Patient has consented for [id number] clinical trial, a randomized control trial evaluating combination chemoRT + durvalumab followed by maintenance vs. SOC chemoRT -> maintenance.  CT scans 7/2021 with CR.  - patient  randomized on [id number] to SOC arm (Imfinzi) - completed 12/2021.    - CT scan 12/2022 with progressing pulmonary nodule in LLL, still with stable disease in RLL consolidation. PET scan also showing enlarging right lower lobe mass with increased hypermetabolic activity concerning for recurrence. Will present her case at the next thoracic tumor board to discuss biopsy and possible treatment options.   - Biopsy of lung mass consistent with SCC. Initiated on 9LA. Initial re-staging scans with stable disease.   - Patient has completed RT 4-5 weeks ago and has no symptoms. She has some mild inflammation on Chest CT, but since asymptomatic and completed RT will re-initiate IO.  - Now s/p 3 cycles of 9LA. With persistent IO induced rash and continued pneumonitis on recent imaging, plan to hold IO and proceed with surveillance.  - 10/16/23 CT showing continued evidence of inflammation/infection. Currently has cold symptoms. +Covid.   - Repeat CT scan (preliminary reading) shows improvement in inflammation but indeterminate possible new liver metastases. Will confirm with PETCT.  - CT CAP is showing progression of disease in L hilum, hilar LN, mediastinal LN, and pleural effusion.   - Plan is to restart ipi + nivo. Not eligible for any trials. Holding Ipi Nivo for colitis. Re-staging scans due in 2 weeks.     2,3. Recovering from left femur nailing. Has oxy IR for pain. PT/OT ordered. Will get bone scans with re-staging to evaluate for metastatic bone disease.    4 Grade 4 needing hospitalization. Required multiple days of steroids prior to resolution of symptoms. Will start authorization for entyvio for steroid dependent ICI colitis. Entyvio has better outcomes than TNF-alpha inhibitors or steroids alone for the treatment of ICI colitis per below refererence.    - Chery F, Courtney D, Monico HASSAN, et al. Efficacy and safety of vedolizumab and infliximab treatment for immune-mediated diarrhea and colitis in patients with cancer: a  two-center observational study Journal for ImmunoTherapy of Cancer 2021.    5. Resolved. Discussed symptoms to report with re-challenging immunotherapy.     6.  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      7-9.  Stable, follows with cardiology. AICD placed, but patient now has recovered EF and only takes lasix prn.  NYHA class I.    9,10. Hypercalcemia resolved. Zometa awaiting Dental clearance. Has not seen a dentist in 4 years.       Patient is in agreement with the proposed treatment plan. All questions were answered to the patient's satisfaction. Pt knows to call clinic if anything is needed before the next clinic visit.    MDM includes:    - Acute or chronic illness or injury that poses a threat to life or bodily function  - Independent review and explanation of 2 results from unique tests  - Discussion of management and ordering 2 unique tests  - Extensive discussion of treatment and management  - Prescription drug management  - Drug therapy requiring intensive monitoring for toxicity    Javi Avina M.D.  Hematology/Oncology Attending  Director [facility] Cancer Therapies Program  [facility]          Route Chart for Scheduling    Med Onc Chart Routing      Follow up with physician 2 weeks. with labs and CT a few days prior   Follow up with DANIEL    Infusion scheduling note   Schedule C1 of entyvio ASAP   Injection scheduling note    Labs CBC, CMP, magnesium and phosphorus   Scheduling:  Preferred lab:  Lab interval:     Imaging Other   CT chest   Pharmacy appointment    Other referrals              Treatment Plan Information   OP NSCLC NIVOLUMAB 360 MG D1 & D22 WITH IPILIMUMAB 1 MG/KG Q6W PLUS CARBOPLATIN (AUC) PACLITAXEL Q3W X2 DOSES Javi Avina MD   Associated diagnosis: Lung mass   noted on 3/12/2020  Associated diagnosis: NSCLC of right lung Stage IIIA, Stage DEREK cT3, cN1, cM0, cT3, cN2, cM1a noted on 10/14/2020   Line of treatment: First Line  Treatment Goal:  Control     Upcoming Treatment Dates - OP NSCLC NIVOLUMAB 360 MG D1 & D22 WITH IPILIMUMAB 1 MG/KG Q6W PLUS CARBOPLATIN (AUC) PACLITAXEL Q3W X2 DOSES    10/3/2024       Immunotherapy       nivolumab 360 mg in 0.9% NaCl 86 mL infusion  10/24/2024       Immunotherapy       nivolumab 360 mg in 0.9% NaCl 86 mL infusion       ipilimumab (YERVOY) 1 mg/kg = 81 mg in 0.9% NaCl 66.2 mL chemo infusion  11/14/2024       Immunotherapy       nivolumab 360 mg in 0.9% NaCl 86 mL infusion  12/5/2024       Immunotherapy       nivolumab 360 mg in 0.9% NaCl 86 mL infusion       ipilimumab (YERVOY) 1 mg/kg = 81 mg in 0.9% NaCl 66.2 mL chemo infusion    Supportive Plan Information  OP ZOLEDRONIC ACID (ZOMETA) Q4W Aide Magaña NP   Associated Diagnosis: Malignant neoplasm metastatic to bone   noted on 8/21/2024   Line of treatment: Supportive Care   Treatment goal: Supportive     Upcoming Treatment Dates - OP ZOLEDRONIC ACID (ZOMETA) Q4W    9/16/2024       Medications       zoledronic 4 mg/100 mL infusion 4 mg  10/14/2024       Medications       zoledronic 4 mg/100 mL infusion 4 mg  11/11/2024       Medications       zoledronic 4 mg/100 mL infusion 4 mg  12/9/2024       Medications       zoledronic 4 mg/100 mL infusion 4 mg    Therapy Plan Information  ENTYVIO GI for Colitis, noted on 10/1/2024  Pre-Medications  acetaminophen tablet 650 mg  650 mg, Oral, Every visit  cetirizine tablet 10 mg  10 mg, Oral, Every visit  diphenhydrAMINE (BENADRYL) 25 mg in 0.9% NaCl 50 mL IVPB  25 mg, Intravenous, Once  Medications  vedolizumab (ENTYVIO) 300 mg/250 mL NS IVPB  300 mg, Intravenous, Once  vedolizumab (ENTYVIO) 300 mg/250 mL NS IVPB  300 mg, Intravenous, Treatments: 2, 3  vedolizumab (ENTYVIO) 300 mg/250 mL NS IVPB  300 mg, Intravenous, Every 8 weeks  PRN Medications  acetaminophen tablet 650 mg  650 mg, Oral, PRN  diphenhydrAMINE injection 25 mg  25 mg, Intravenous, Once  albuterol-ipratropium 2.5 mg-0.5 mg/3 mL nebulizer solution 3 mL  3  mL, Nebulization, PRN  methylPREDNISolone sodium succinate injection 40 mg  40 mg, Intravenous, PRN  EPINEPHrine (PF) injection 0.3 mg  0.3 mg, Subcutaneous, PRN  Flushes  0.9% NaCl 250 mL flush bag  Intravenous, Every visit  sodium chloride 0.9% flush 10 mL  10 mL, Intravenous, Every visit  heparin, porcine (PF) 100 unit/mL injection flush 500 Units  500 Units, Intravenous, Every visit  alteplase injection 2 mg  2 mg, Intra-Catheter, Every visit      No therapy plan of the specified type found.    No therapy plan of the specified type found.

## 2024-10-18 ENCOUNTER — OFFICE VISIT (OUTPATIENT)
Dept: FAMILY MEDICINE | Facility: CLINIC | Age: 69
End: 2024-10-18
Payer: MEDICARE

## 2024-10-18 VITALS
DIASTOLIC BLOOD PRESSURE: 62 MMHG | SYSTOLIC BLOOD PRESSURE: 118 MMHG | BODY MASS INDEX: 31.77 KG/M2 | HEART RATE: 82 BPM | TEMPERATURE: 98 F | OXYGEN SATURATION: 95 % | WEIGHT: 172.63 LBS | HEIGHT: 62 IN

## 2024-10-18 DIAGNOSIS — R60.0 BILATERAL LEG EDEMA: ICD-10-CM

## 2024-10-18 DIAGNOSIS — Z95.810 CARDIAC RESYNCHRONIZATION THERAPY DEFIBRILLATOR (CRT-D) IN PLACE: ICD-10-CM

## 2024-10-18 DIAGNOSIS — Z09 HOSPITAL DISCHARGE FOLLOW-UP: Primary | ICD-10-CM

## 2024-10-18 DIAGNOSIS — M84.452D: ICD-10-CM

## 2024-10-18 DIAGNOSIS — E87.6 HYPOKALEMIA: ICD-10-CM

## 2024-10-18 DIAGNOSIS — I10 ESSENTIAL HYPERTENSION: ICD-10-CM

## 2024-10-18 DIAGNOSIS — E11.9 TYPE 2 DIABETES MELLITUS WITHOUT COMPLICATION, WITHOUT LONG-TERM CURRENT USE OF INSULIN: ICD-10-CM

## 2024-10-18 DIAGNOSIS — C34.91 NSCLC OF RIGHT LUNG: ICD-10-CM

## 2024-10-18 PROCEDURE — 99999 PR PBB SHADOW E&M-EST. PATIENT-LVL III: CPT | Mod: PBBFAC,,,

## 2024-10-18 NOTE — PATIENT INSTRUCTIONS
Monitor weight and let us know if weight gain of 5lbs in 1 week or 3 lbs in 24 hours. Check weight same time every day. Monitor for leg swelling and increased SOB.     TLC: Therapeutic Lifestyle Changes    Weight reduction: maintain a normal body weight (BMI 19-25)  DASH diet: Consume diet rich in fruits, vegetables, and low-fat dairy products with a reduced content of saturated fat and total fat.   Low-sodium diet: consume <2400mg of sodium per day  Increase physical activity: regular aerobic physical activity (150 min per week)  Limit alcohol consumption: Less than 2 drinks/day in men and less than 1 drink/day in women.   QUIT smoking

## 2024-10-18 NOTE — PROGRESS NOTES
HPI     Chief Complaint:  No chief complaint on file.      Hannah Montoya is a 69 y.o. female with multiple medical diagnoses as listed in the medical history and problem list that presents for No chief complaint on file.  .   Patient is not known to me with her last appointment in this department on 9/13/2024.      HPI    Started on lasix x 2 days ago for ankle swelling. Ankle swelling improved. Also taking potassium. Diarrhea resolved. LBM today was normal.  Energy level  better. Ambulatory with walker. Using since she broke leg. No abd pain.     Hem/onc - VV with Dr. Avina yestrday. Continues to be on steroid for colitis, decreased by 1/2 pill every 3 days.      Hospital encounter notes, objective/subjective data, diagnostics, and plan of care from 10/7 reviewed.    PT - starting next week with [facility]   Weekly HH coming out once per week.     Wt Readings from Last 3 Encounters:   10/18/24 1010 78.3 kg (172 lb 9.9 oz)   10/03/24 1642 87.6 kg (193 lb 2 oz)   10/01/24 2301 80.5 kg (177 lb 7.5 oz)   10/01/24 0813 81.6 kg (180 lb)   09/24/24 1354 82.2 kg (181 lb 3.5 oz)         [facility] Medicine  Discharge Summary        Patient Name: Hannah Montoya  MRN: [id number]  Patient Class: IP- Inpatient  Admission Date: 10/1/2024  Hospital Length of Stay: 5 days  Discharge Date and Time:  10/07/2024 12:36 PM  Attending Physician: Jennifer Perdomo MD   Discharging Provider: Jennifer Perdomo MD  Primary Care Provider: Emanuel Chavez MD        HPI:   Hannah Monotya is a 69 y.o. female with  stage IV NSCLC with pathologic fracture (armani Avina), Non-Insulin-dependent DM2 without complications, HTN, HLD, and Depression  who presented to [facility] ED on 10/02/2024 for eval and treatment of nausea and vomiting associated with diffuse abdominal pain.  She started treatment with Opdivo on September 12th and reports feeling well up until a few days prior to presentation:  started feeling malaise, nausea, and then diarrhea.  The diarrhea has worsened over the past 2 days such that she has had over 12 episodes in the last 24 hrs.  Described as watery, non-bloody, and foul-smelling.  Daughter reports she is now too weak to get out of bed.  Pt reports having started using Depends, but has soaked through her most recent pad.  There is associated general abdominal cramping.  They deny associated fever, chills, dyspnea, CP, palpitations.  Symptoms are worsened / alleviated by nothing.     ED course notable for the following:  Patient uncomfortable and appears tired.  Afebrile, HDS.  Exam: pt is pale, and has diffuse abdominal tenderness to palpation but no guarding or rebound tenderness.  Labs WBC 6.91 HGB 9.2 .  Na 134 K 4.5 BUN 27 CRT 1.1 albumin 2.3.  CT A/P w con: The visualized loops of small and large bowel show no evidence of obstruction or inflammation.   ED therapy/stabilization measures:  IV fluids and Zofran.  Heme Onc and GI made aware.  They were placed in observation under the care of [facility] for further evaluation and treatment.      Procedure(s) (LRB):  EGD (ESOPHAGOGASTRODUODENOSCOPY) (N/A)  COLONOSCOPY (N/A)       Hospital Course:   Hannah Montoya 69 y.o. female placed in observation for diarrhea.  She presented with profuse watery diarrhea in the setting of chemotherapy use.  She was without evidence of sepsis.  CT scan without acute intra-abdominal abnormalities.  She was seen by GI with plans for colonoscopy.  Underwent EGD and colonoscopy with evidence of erosions requiring biopsies.  Her magnesium and phosphorus remain low secondary to diarrhea and decreased p.o. intake which were both replaced.  Her QuantiFERON gold turned to indeterminate and was checked as a screening test plaque beginning possible infliximab should she have checkpoint induced mediated colitis.  PPD placed for confirmation. She was started on high dose  steroids for colonoscopy/EGD findings. Pt was cleared to DC by GI. Steroid taper given. OP FU with IBD clinic.       Goals of Care Treatment Preferences:  Code Status: Full Code     Health care agent: Elizabeth Bob  Health care agent number: [phone]     Living Will: Yes  Assessment & Plan       Problem List Items Addressed This Visit       Essential hypertension  The current medical regimen is effective;  continue present plan and medications.      Type 2 diabetes mellitus without complication  Lab Results   Component Value Date    HGBA1C 6.1 (H) 08/19/2024     The current medical regimen is effective;  continue present plan and medications.      Cardiac resynchronization therapy defibrillator (CRT-D) in place  F/b cardiology. Pacemaker noted to left chest wall. Has device check 11/1.     NSCLC of right lung  F/b hem/onc.  Will continue to maximize risk factor reduction and adjust medication as needed      Pathological fracture of intertrochanteric section of femur  Ambulatory with rolling walker. Continues to participate in PT     Other Visit Diagnoses       Hospital discharge follow-up    -  Primary  Transitional Care Note    Family and/or Caretaker present at visit?  No. Son drove pt to appt.   Diagnostic tests reviewed/disposition: I have reviewed all completed as well as pending diagnostic tests at the time of discharge.  Disease/illness education: Fall risk, monitor for weight gain and leg swelling. Low sodium diet. Leg elevation. RTC for weight gain of >5 lbs in 1 week or 3 lbs in 1 day. Monitor for SOB  Home health/community services discussion/referrals: Patient has home health established at [facility] .   Establishment or re-establishment of referral orders for community resources: No other necessary community resources.   Discussion with other health care providers: No discussion with other health care providers necessary.           Bilateral leg edema      Monitor weight gain and leg swelling.  Continue 7 day course of Lasix and potasium supplement.   Check CMP 11/8 with other labs  Leg swelling might continue while on tapered prednisone, consider extending rx if needed. Pt will reach out if swelling does not improve      Relevant Orders    Comprehensive Metabolic Panel    Hypokalemia      Lab Results   Component Value Date    K 3.6 10/07/2024     Recheck 11/8. The current medical regimen is effective;  continue present plan and medications.      Relevant Orders    Comprehensive Metabolic Panel            --------------------------------------------      Health Maintenance:  Health Maintenance         Date Due Completion Date    TETANUS VACCINE Never done ---    RSV Vaccine (Age 60+ and Pregnant patients) (1 - Risk 60-74 years 1-dose series) Never done ---    Foot Exam 08/03/2021 8/3/2020 (Done)    Override on 8/3/2020: Done    Override on 1/21/2019: Done    Override on 12/28/2017: Done    Influenza Vaccine (1) 09/01/2024 10/3/2023    Override on 9/18/2017: Done (done at work)    Override on 10/3/2016: Done    Override on 10/12/2015: Done    COVID-19 Vaccine (4 - 2024-25 season) 09/01/2024 9/16/2021    Mammogram 10/02/2024 10/2/2023    Override on 6/2/2015: Done    Diabetes Urine Screening 10/16/2024 10/16/2023    Eye Exam 12/06/2024 12/6/2023    Override on 2/8/2016: Done    Hemoglobin A1c 02/19/2025 8/19/2024    Override on 6/3/2015: Done    Lipid Panel 06/06/2025 6/6/2024    Override on 6/2/2015: Done            Health maintenance reviewed    Follow Up:  No follow-ups on file.      Discussed DDx, condition, and treatment.   Education sent to patient portal/included in after visit summary.  ED precautions given.   Notify provider if symptoms do not resolve or increase in severity.   Patient verbalizes understanding and agrees with plan of care.    Exam     Review of Systems:  (as noted above)  Review of Systems    Physical Exam:   Physical Exam  Constitutional:       General: She is not in acute  "distress.     Appearance: Normal appearance. She is obese. She is not ill-appearing, toxic-appearing or diaphoretic.   HENT:      Head: Normocephalic and atraumatic.      Comments: Moist mucous membranes     Nose: Nose normal.      Mouth/Throat:      Pharynx: Oropharynx is clear. No posterior oropharyngeal erythema.   Eyes:      General: No scleral icterus.     Conjunctiva/sclera: Conjunctivae normal.   Cardiovascular:      Rate and Rhythm: Normal rate.      Pulses:           Dorsalis pedis pulses are 1+ on the right side and 1+ on the left side.        Posterior tibial pulses are 1+ on the right side and 1+ on the left side.   Pulmonary:      Effort: Pulmonary effort is normal. No respiratory distress.      Breath sounds: Normal breath sounds.   Musculoskeletal:         General: Normal range of motion.      Cervical back: Normal range of motion.      Right lower le+ Edema present.      Left lower le+ Edema present.      Comments: Amb with rolling walker   Skin:     General: Skin is warm.      Capillary Refill: Capillary refill takes less than 2 seconds.   Neurological:      General: No focal deficit present.      Mental Status: She is alert and oriented to person, place, and time.   Psychiatric:         Mood and Affect: Mood normal.       Vitals:    10/18/24 1010   BP: 118/62   BP Location: Left arm   Patient Position: Sitting   Pulse: 82   Temp: 98 °F (36.7 °C)   TempSrc: Oral   SpO2: (!) 94%   Weight: 78.3 kg (172 lb 9.9 oz)   Height: 5' 2" (1.575 m)      Body mass index is 31.57 kg/m².        History     Past Medical History:  Past Medical History:   Diagnosis Date    AICD (automatic cardioverter/defibrillator) present     Asthma     bronchitis in past    Breast cancer 2016    right    Cardiac pacemaker     Cardiomyopathy     COPD (chronic obstructive pulmonary disease)     Diabetes mellitus, type 2     Hyperglycemia     Hyperlipidemia     Hypertension     Malignant neoplasm of overlapping sites of " female breast 2016    Nuclear sclerosis of both eyes 2020    Respiratory distress 3/12/2020       Past Surgical History:  Past Surgical History:   Procedure Laterality Date    BIOPSY, WITH CT GUIDANCE Right 2023    Procedure: LUNG MASS BIOPSY, WITH CT GUIDANCE;  Surgeon: Yehuda Green MD;  Location: [facility] CATH LAB;  Service: Radiology;  Laterality: Right;    BREAST BIOPSY Right 2016    IDC    BREAST LUMPECTOMY Right     CARDIAC CATHETERIZATION Bilateral 2017    CARDIAC DEFIBRILLATOR PLACEMENT Left 08/10/2017    CARDIAC DEFIBRILLATOR PLACEMENT Left 2018     SECTION      x2    CHOLECYSTECTOMY      COLONOSCOPY N/A 10/3/2024    Procedure: COLONOSCOPY;  Surgeon: Wicho Serna MD;  Location: [facility] ENDO;  Service: Gastroenterology;  Laterality: N/A;    ESOPHAGOGASTRODUODENOSCOPY N/A 10/3/2024    Procedure: EGD (ESOPHAGOGASTRODUODENOSCOPY);  Surgeon: Wicho Serna MD;  Location: [facility] ENDO;  Service: Gastroenterology;  Laterality: N/A;    FEMUR BIOPSY Left 2024    Procedure: BIOPSY, FEMUR;  Surgeon: Porter Campos MD;  Location: [facility] OR 2ND FLR;  Service: Orthopedics;  Laterality: Left;    INSERTION OF TUNNELED CENTRAL VENOUS CATHETER (CVC) WITH SUBCUTANEOUS PORT Right 2020    Procedure: UFCIYQRAC-UQEW-A-CATH, RIGHT;  Surgeon: Josefa Caceres MD;  Location: [facility] OR 2ND FLR;  Service: General;  Laterality: Right;  Port-a-cath placed to R. IJ    INTRAMEDULLARY RODDING OF FEMUR Left 2024    Procedure: INSERTION, INTRAMEDULLARY ANTOINE, FEMUR;  Surgeon: Porter Campos MD;  Location: [facility] OR 2ND FLR;  Service: Orthopedics;  Laterality: Left;    LUNG BIOPSY N/A 2020    Procedure: BIOPSY, LUNG;  Surgeon: [facility] Diagnostic Provider;  Location: [facility] OR;  Service: Radiology;  Laterality: N/A;  8AM START  RN PREOP 2020---COVID NEGATIVE    NAVIGATIONAL BRONCHOSCOPY N/A 10/13/2020    Procedure: BRONCHOSCOPY, NAVIGATIONAL;  Surgeon: Jamilah Weaver MD;   [facility];  Service: Pulmonary;  Laterality: N/A;    REVISION OF IMPLANTABLE CARDIOVERTER-DEFIBRILLATOR (ICD) ELECTRODE LEAD PLACEMENT N/A 6/15/2018    Procedure: REVISION-LEAD-ICD;  Surgeon: Javy Gates MD;  Location: [facility] CATH LAB;  Service: Cardiology;  Laterality: N/A;  LBBB, Bi-V ICD HIS Ld rev, MDT, Choice, MB, 3 Prep    ROBOT-ASSISTED LAPAROSCOPIC LYMPHADENECTOMY USING DA SALVADOR XI Right 10/23/2020    Procedure: XI ROBOTIC LYMPHADENECTOMY;  Surgeon: Ramo Tucker MD;  Location: [facility] OR [facility] FLR;  Service: Thoracic;  Laterality: Right;    TUBAL LIGATION         Social History:  Social History     Socioeconomic History    Marital status:     Number of children: 2   Occupational History     Employer: [employer]   Tobacco Use    Smoking status: Former     Current packs/day: 0.00     Average packs/day: 0.5 packs/day for 40.0 years (20.0 ttl pk-yrs)     Types: Cigarettes     Start date: 1976     Quit date: 2016     Years since quittin.2     Passive exposure: Past    Smokeless tobacco: Never   Substance and Sexual Activity    Alcohol use: Yes     Alcohol/week: 1.0 standard drink of alcohol     Types: 1 Glasses of wine per week     Comment: rare- holiday    Drug use: No    Sexual activity: Not Currently     Partners: Male     Comment:      Social Drivers of Health     Financial Resource Strain: Low Risk  (10/4/2024)    Overall Financial Resource Strain (CARDIA)     Difficulty of Paying Living Expenses: Not very hard   Food Insecurity: No Food Insecurity (10/4/2024)    Hunger Vital Sign     Worried About Running Out of Food in the Last Year: Never true     Ran Out of Food in the Last Year: Never true   Recent Concern: Food Insecurity - Food Insecurity Present (2024)    Hunger Vital Sign     Worried About Running Out of Food in the Last Year: Sometimes true     Ran Out of Food in the Last Year: Never true   Transportation Needs: No Transportation Needs  (10/4/2024)    TRANSPORTATION NEEDS     Transportation : No   Physical Activity: Inactive (10/4/2024)    Exercise Vital Sign     Days of Exercise per Week: 0 days     Minutes of Exercise per Session: 0 min   Stress: Stress Concern Present (10/4/2024)    [employer] [place] [employer] - Occupational Stress Questionnaire     Feeling of Stress : To some extent   Housing Stability: Low Risk  (10/4/2024)    Housing Stability Vital Sign     Unable to Pay for Housing in the Last Year: No     Homeless in the Last Year: No       Family History:  Family History   Problem Relation Name Age of Onset    Kidney disease Mother      Cataracts Mother      Kidney disease Father      Cataracts Father      No Known Problems Sister      Clotting disorder Brother      No Known Problems Maternal Aunt      No Known Problems Paternal Aunt      No Known Problems Maternal Uncle      No Known Problems Paternal Uncle      No Known Problems Maternal Grandfather      Macular degeneration Maternal Grandmother      No Known Problems Paternal Grandfather      No Known Problems Paternal Grandmother      Anxiety disorder Daughter      Anxiety disorder Son      Breast cancer Neg Hx      Ovarian cancer Neg Hx      Amblyopia Neg Hx      Blindness Neg Hx      Cancer Neg Hx      Diabetes Neg Hx      Glaucoma Neg Hx      Hypertension Neg Hx      Retinal detachment Neg Hx      Strabismus Neg Hx      Stroke Neg Hx      Thyroid disease Neg Hx         Allergies and Medications: (updated and reviewed)  Review of patient's allergies indicates:   Allergen Reactions    Taxol [paclitaxel]      Hypersensitivity reaction to taxol, symptoms included shortness of breath, nausea, dizziness, flushing     Carboplatin Other (See Comments)     Itching and hives    Adhesive Rash     tegaderm burns and blistered skin     Current Outpatient Medications   Medication Sig Dispense Refill    ACCU-CHEK ALFRED PLUS TEST STRP Strp USE TO TEST THREE TIMES DAILY 200 strip 3     acetaminophen (TYLENOL) 500 MG tablet Take 2 tablets (1,000 mg total) by mouth every 8 (eight) hours as needed for Pain.      albuterol (ACCUNEB) 1.25 mg/3 mL Nebu use 1 AMPULE (3ml) via NEBULIZER EVERY 6 HOURS AS NEEDED 300 mL 3    albuterol (VENTOLIN HFA) 90 mcg/actuation inhaler Inhale 2 puffs into the lungs every 4 (four) hours as needed for Wheezing or Shortness of Breath. Rescue 18 g 4    amLODIPine (NORVASC) 5 MG tablet TAKE 1 TABLET BY MOUTH EVERY DAY 90 tablet 2    atorvastatin (LIPITOR) 10 MG tablet TAKE 1 TABLET BY MOUTH EVERY DAY 90 tablet 1    benzonatate (TESSALON) 200 MG capsule Take 1 capsule (200 mg total) by mouth 3 (three) times daily as needed for Cough. 30 capsule 1    buPROPion (WELLBUTRIN XL) 150 MG TB24 tablet TAKE 1 TABLET BY MOUTH EVERY DAY 90 tablet 3    cetirizine (ZYRTEC) 10 MG tablet Take 10 mg by mouth every evening.       cholecalciferol, vitamin D3, (VITAMIN D3) 50 mcg (2,000 unit) Tab Take 1 tablet (2,000 Units total) by mouth once daily. 30 tablet 11    furosemide (LASIX) 40 MG tablet Take 1 tablet (40 mg total) by mouth once daily. 7 tablet 0    gabapentin (NEURONTIN) 300 MG capsule Take 1 capsule (300 mg total) by mouth 3 (three) times daily. 90 capsule 11    losartan (COZAAR) 100 MG tablet TAKE 1 TABLET BY MOUTH EVERY DAY 90 tablet 3    meloxicam (MOBIC) 7.5 MG tablet Take 1 tablet (7.5 mg total) by mouth once daily. 30 tablet 1    metFORMIN (GLUCOPHAGE) 500 MG tablet TAKE 2 TABLETS BY MOUTH TWICE A DAY WITH MEALS (Patient taking differently: Take 1,000 mg by mouth daily with breakfast.) 360 tablet 3    metoprolol succinate (TOPROL-XL) 100 MG 24 hr tablet TAKE 1 TABLET BY MOUTH EVERY DAY 90 tablet 3    multivitamin (THERAGRAN) per tablet Take 1 tablet by mouth once daily.      ondansetron (ZOFRAN-ODT) 8 MG TbDL Take 1 tablet (8 mg total) by mouth every 8 (eight) hours as needed (nausea/vomiting). Take 1 tablet (8 mg) by mouth every 8 hours as needed for nausea/vomiting. 60  tablet 5    oxyCODONE (ROXICODONE) 5 MG immediate release tablet Take 1 tablet (5 mg total) by mouth every 6 (six) hours as needed for Pain. 28 tablet 0    palonosetron (ALOXI) 0.25 mg/5 mL injection       pantoprazole (PROTONIX) 40 MG tablet TAKE 1 TABLET BY MOUTH EVERY DAY 90 tablet 3    potassium chloride (MICRO-K) 10 MEQ CpSR Take 1 capsule (10 mEq total) by mouth once daily. for 7 doses 7 capsule 0    predniSONE (DELTASONE) 20 MG tablet Take 4 tablets (80 mg total) by mouth once daily x3 days, THEN 3.5 tablets (70 mg total) once daily x7 days, THEN 3 tablets (60 mg total) once daily x7 days, THEN 2.5 tablets (50 mg total) once daily x7 days, THEN 2 tablets (40 mg total) once daily x7 days, THEN 1.5 tablets (30 mg total) once daily x7 days, THEN 1 tablet (20 mg total) once daily x7 days, THEN 0.5 tablets (10 mg total) once daily x7 days. 110 tablet 0    READI-CAT 2 2 % (w/v) suspension       READI-CAT 2 2.1 % (w/v), 2.0 % (w/w) suspension       sertraline (ZOLOFT) 100 MG tablet TAKE 1 TABLET BY MOUTH EVERY DAY IN THE EVENING 90 tablet 2    tiotropium (SPIRIVA WITH HANDIHALER) 18 mcg inhalation capsule INHALE THE CONTENTS OF 1 CAPSULE BY MOUTH EVERY DAY 90 capsule 3    WIXELA INHUB 250-50 mcg/dose diskus inhaler INHALE 1 PUFF INTO THE LUNGS 2 (TWO) TIMES DAILY. CONTROLLER 180 each 3    nystatin (MYCOSTATIN) powder Apply topically 2 (two) times daily. 60 g 1     No current facility-administered medications for this visit.     Facility-Administered Medications Ordered in Other Visits   Medication Dose Route Frequency Provider Last Rate Last Admin    fentaNYL injection 25 mcg  25 mcg Intravenous Q5 Min PRN Keesha Martins MD        haloperidol lactate injection 0.5 mg  0.5 mg Intravenous Once PRN Keesha Martins MD        HYDROmorphone injection 0.2 mg  0.2 mg Intravenous Q5 Min PRN Keesha Martins MD        ondansetron injection 4 mg  4 mg Intravenous Once PRN Keesha Martins MD        sodium chloride 0.9% flush 10 mL  10 mL  Intravenous PRN Keesha Martins MD           Patient Care Team:  Emanuel Chavez MD as PCP - General (Family Medicine)  Julianne Juarez OD as Consulting Physician (Optometry)  Caridad Menard LPN as Care Coordinator  Javi Avina MD as Oncologist (Hematology and Oncology)  Felice Anderson, Casey as Diabetes Digital Medicine Clinician (Pharmacist)  Emanuel Chavez MD as Diabetes Digital Medicine Responsible Provider (Family Medicine)  [facility] as Diabetes Digital Medicine Contract  Danitza Weir as Digital Medicine Health   Silvia Riley as ED Navigator         - The patient is given an After Visit Summary that lists all medications with directions, allergies, education, orders placed during this encounter and follow-up instructions.      - I have reviewed the patient's medical information including past medical, family, and social history sections including the medications and allergies.      - We discussed the patient's current medications.     This note was created by combination of typed  and MModal dictation.  Transcription errors may be present.  If there are any questions, please contact me.

## 2024-10-20 ENCOUNTER — HOSPITAL ENCOUNTER (EMERGENCY)
Facility: HOSPITAL | Age: 69
Discharge: HOME | End: 2024-10-20
Attending: EMERGENCY MEDICINE
Payer: MEDICARE

## 2024-10-20 ENCOUNTER — NURSE TRIAGE (OUTPATIENT)
Dept: ADMINISTRATIVE | Facility: CLINIC | Age: 69
End: 2024-10-20
Payer: MEDICARE

## 2024-10-20 VITALS
WEIGHT: 172 LBS | SYSTOLIC BLOOD PRESSURE: 137 MMHG | BODY MASS INDEX: 31.65 KG/M2 | HEIGHT: 62 IN | HEART RATE: 82 BPM | RESPIRATION RATE: 21 BRPM | DIASTOLIC BLOOD PRESSURE: 69 MMHG | OXYGEN SATURATION: 95 % | TEMPERATURE: 98 F

## 2024-10-20 DIAGNOSIS — E83.42 HYPOMAGNESEMIA: ICD-10-CM

## 2024-10-20 DIAGNOSIS — E83.39 HYPOPHOSPHATEMIA: ICD-10-CM

## 2024-10-20 DIAGNOSIS — R60.0 BILATERAL LEG EDEMA: ICD-10-CM

## 2024-10-20 DIAGNOSIS — I82.431 ACUTE DEEP VEIN THROMBOSIS (DVT) OF RIGHT POPLITEAL VEIN: Primary | ICD-10-CM

## 2024-10-20 LAB
ALBUMIN SERPL BCP-MCNC: 2.8 G/DL (ref 3.5–5.2)
ALP SERPL-CCNC: 150 U/L (ref 40–150)
ALT SERPL W/O P-5'-P-CCNC: 19 U/L (ref 10–44)
ANION GAP SERPL CALC-SCNC: 13 MMOL/L (ref 8–16)
APICAL FOUR CHAMBER EJECTION FRACTION: 51 %
APICAL TWO CHAMBER EJECTION FRACTION: 51 %
AST SERPL-CCNC: 15 U/L (ref 10–40)
AV INDEX (PROSTH): 0.59
AV MEAN GRADIENT: 4.8 MMHG
AV PEAK GRADIENT: 6.8 MMHG
AV VALVE AREA BY VELOCITY RATIO: 2.1 CM²
AV VALVE AREA: 2 CM²
AV VELOCITY RATIO: 0.62
BASOPHILS # BLD AUTO: 0.03 K/UL (ref 0–0.2)
BASOPHILS NFR BLD: 0.4 % (ref 0–1.9)
BILIRUB SERPL-MCNC: 0.3 MG/DL (ref 0.1–1)
BNP SERPL-MCNC: 191 PG/ML (ref 0–99)
BSA FOR ECHO PROCEDURE: 1.85 M2
BUN SERPL-MCNC: 18 MG/DL (ref 8–23)
CALCIUM SERPL-MCNC: 9 MG/DL (ref 8.7–10.5)
CHLORIDE SERPL-SCNC: 101 MMOL/L (ref 95–110)
CO2 SERPL-SCNC: 26 MMOL/L (ref 23–29)
CREAT SERPL-MCNC: 1 MG/DL (ref 0.5–1.4)
CRP SERPL-MCNC: 10.2 MG/L (ref 0–8.2)
CV ECHO LV RWT: 0.53 CM
DIFFERENTIAL METHOD BLD: ABNORMAL
DOP CALC AO PEAK VEL: 1.3 M/S
DOP CALC AO VTI: 29.3 CM
DOP CALC LVOT AREA: 3.5 CM2
DOP CALC LVOT DIAMETER: 2.1 CM
DOP CALC LVOT PEAK VEL: 0.8 M/S
DOP CALC LVOT STROKE VOLUME: 59.9 CM3
DOP CALCLVOT PEAK VEL VTI: 17.3 CM
E WAVE DECELERATION TIME: 145.95 MSEC
E/A RATIO: 1.04
E/E' RATIO: 10.76 M/S
ECHO LV POSTERIOR WALL: 1 CM (ref 0.6–1.1)
EOSINOPHIL # BLD AUTO: 0 K/UL (ref 0–0.5)
EOSINOPHIL NFR BLD: 0.1 % (ref 0–8)
ERYTHROCYTE [DISTWIDTH] IN BLOOD BY AUTOMATED COUNT: 22.2 % (ref 11.5–14.5)
EST. GFR  (NO RACE VARIABLE): >60 ML/MIN/1.73 M^2
FRACTIONAL SHORTENING: 28.9 % (ref 28–44)
GLUCOSE SERPL-MCNC: 213 MG/DL (ref 70–110)
HCT VFR BLD AUTO: 30.5 % (ref 37–48.5)
HGB BLD-MCNC: 8.9 G/DL (ref 12–16)
IMM GRANULOCYTES # BLD AUTO: 0.12 K/UL (ref 0–0.04)
IMM GRANULOCYTES NFR BLD AUTO: 1.4 % (ref 0–0.5)
INTERVENTRICULAR SEPTUM: 1.2 CM (ref 0.6–1.1)
IVC DIAMETER: 1.19 CM
LA MAJOR: 5.27 CM
LA MINOR: 5.02 CM
LA WIDTH: 3.4 CM
LEFT ATRIUM SIZE: 2.69 CM
LEFT ATRIUM VOLUME INDEX: 22.3 ML/M2
LEFT ATRIUM VOLUME: 39.97 CM3
LEFT INTERNAL DIMENSION IN SYSTOLE: 2.7 CM (ref 2.1–4)
LEFT VENTRICLE DIASTOLIC VOLUME INDEX: 34.94 ML/M2
LEFT VENTRICLE DIASTOLIC VOLUME: 62.54 ML
LEFT VENTRICLE END DIASTOLIC VOLUME APICAL 2 CHAMBER: 108.14 ML
LEFT VENTRICLE END DIASTOLIC VOLUME APICAL 4 CHAMBER: 62.54 ML
LEFT VENTRICLE MASS INDEX: 75.2 G/M2
LEFT VENTRICLE SYSTOLIC VOLUME INDEX: 15.6 ML/M2
LEFT VENTRICLE SYSTOLIC VOLUME: 27.95 ML
LEFT VENTRICULAR INTERNAL DIMENSION IN DIASTOLE: 3.8 CM (ref 3.5–6)
LEFT VENTRICULAR MASS: 134.7 G
LV LATERAL E/E' RATIO: 10.27 M/S
LV SEPTAL E/E' RATIO: 11.3 M/S
LVED V (TEICH): 62.54 ML
LVES V (TEICH): 27.95 ML
LVOT MG: 1.52 MMHG
LVOT MV: 0.59 CM/S
LYMPHOCYTES # BLD AUTO: 0.7 K/UL (ref 1–4.8)
LYMPHOCYTES NFR BLD: 7.7 % (ref 18–48)
MAGNESIUM SERPL-MCNC: 1.1 MG/DL (ref 1.6–2.6)
MCH RBC QN AUTO: 23.6 PG (ref 27–31)
MCHC RBC AUTO-ENTMCNC: 29.2 G/DL (ref 32–36)
MCV RBC AUTO: 81 FL (ref 82–98)
MONOCYTES # BLD AUTO: 0.4 K/UL (ref 0.3–1)
MONOCYTES NFR BLD: 4.6 % (ref 4–15)
MV PEAK A VEL: 1.09 M/S
MV PEAK E VEL: 1.13 M/S
MV STENOSIS PRESSURE HALF TIME: 42.33 MS
MV VALVE AREA P 1/2 METHOD: 5.2 CM2
NEUTROPHILS # BLD AUTO: 7.3 K/UL (ref 1.8–7.7)
NEUTROPHILS NFR BLD: 85.8 % (ref 38–73)
NRBC BLD-RTO: 0 /100 WBC
OHS CV RV/LV RATIO: 0.89 CM
OHS LV EJECTION FRACTION SIMPSONS BIPLANE MOD: 51 %
PHOSPHATE SERPL-MCNC: 1.9 MG/DL (ref 2.7–4.5)
PISA TR MAX VEL: 2.75 M/S
PLATELET # BLD AUTO: 156 K/UL (ref 150–450)
PMV BLD AUTO: 10.1 FL (ref 9.2–12.9)
POTASSIUM SERPL-SCNC: 3.7 MMOL/L (ref 3.5–5.1)
PROCALCITONIN SERPL IA-MCNC: 0.05 NG/ML
PROT SERPL-MCNC: 5.9 G/DL (ref 6–8.4)
RA MAJOR: 4.88 CM
RA PRESSURE ESTIMATED: 8 MMHG
RA WIDTH: 3.4 CM
RBC # BLD AUTO: 3.77 M/UL (ref 4–5.4)
RIGHT VENTRICLE DIASTOLIC BASEL DIMENSION: 3.4 CM
RIGHT VENTRICULAR END-DIASTOLIC DIMENSION: 3.38 CM
RV TB RVSP: 11 MMHG
RV TISSUE DOPPLER FREE WALL SYSTOLIC VELOCITY 1 (APICAL 4 CHAMBER VIEW): 8.77 CM/S
SINUS: 3.25 CM
SODIUM SERPL-SCNC: 140 MMOL/L (ref 136–145)
STJ: 2.72 CM
TDI LATERAL: 0.11 M/S
TDI SEPTAL: 0.1 M/S
TDI: 0.11 M/S
TR MAX PG: 30 MMHG
TRICUSPID ANNULAR PLANE SYSTOLIC EXCURSION: 1.78 CM
TROPONIN I SERPL DL<=0.01 NG/ML-MCNC: 0.01 NG/ML (ref 0–0.03)
TV REST PULMONARY ARTERY PRESSURE: 38 MMHG
WBC # BLD AUTO: 8.46 K/UL (ref 3.9–12.7)
Z-SCORE OF LEFT VENTRICULAR DIMENSION IN END DIASTOLE: -2.64
Z-SCORE OF LEFT VENTRICULAR DIMENSION IN END SYSTOLE: -0.99

## 2024-10-20 PROCEDURE — 83735 ASSAY OF MAGNESIUM: CPT | Performed by: EMERGENCY MEDICINE

## 2024-10-20 PROCEDURE — 86140 C-REACTIVE PROTEIN: CPT | Performed by: EMERGENCY MEDICINE

## 2024-10-20 PROCEDURE — 83880 ASSAY OF NATRIURETIC PEPTIDE: CPT | Performed by: EMERGENCY MEDICINE

## 2024-10-20 PROCEDURE — 96365 THER/PROPH/DIAG IV INF INIT: CPT | Mod: 59

## 2024-10-20 PROCEDURE — 25000003 PHARM REV CODE 250: Performed by: EMERGENCY MEDICINE

## 2024-10-20 PROCEDURE — 84145 PROCALCITONIN (PCT): CPT | Performed by: EMERGENCY MEDICINE

## 2024-10-20 PROCEDURE — 84100 ASSAY OF PHOSPHORUS: CPT | Performed by: EMERGENCY MEDICINE

## 2024-10-20 PROCEDURE — 84484 ASSAY OF TROPONIN QUANT: CPT | Performed by: EMERGENCY MEDICINE

## 2024-10-20 PROCEDURE — 93010 ELECTROCARDIOGRAM REPORT: CPT | Mod: ,,, | Performed by: INTERNAL MEDICINE

## 2024-10-20 PROCEDURE — 80053 COMPREHEN METABOLIC PANEL: CPT | Performed by: EMERGENCY MEDICINE

## 2024-10-20 PROCEDURE — 85025 COMPLETE CBC W/AUTO DIFF WBC: CPT | Performed by: EMERGENCY MEDICINE

## 2024-10-20 PROCEDURE — 93005 ELECTROCARDIOGRAM TRACING: CPT

## 2024-10-20 PROCEDURE — 63600175 PHARM REV CODE 636 W HCPCS: Performed by: EMERGENCY MEDICINE

## 2024-10-20 PROCEDURE — 96366 THER/PROPH/DIAG IV INF ADDON: CPT

## 2024-10-20 PROCEDURE — 99285 EMERGENCY DEPT VISIT HI MDM: CPT | Mod: 25

## 2024-10-20 RX ORDER — MAGNESIUM SULFATE HEPTAHYDRATE 40 MG/ML
2 INJECTION, SOLUTION INTRAVENOUS ONCE
Status: COMPLETED | OUTPATIENT
Start: 2024-10-20 | End: 2024-10-20

## 2024-10-20 RX ORDER — APIXABAN 5 MG (74)
KIT ORAL
Qty: 74 TABLET | Refills: 0 | Status: SHIPPED | OUTPATIENT
Start: 2024-10-20

## 2024-10-20 RX ORDER — BACITRACIN ZINC 500 UNIT/G
OINTMENT (GRAM) TOPICAL 2 TIMES DAILY
Qty: 30 G | Refills: 0 | Status: SHIPPED | OUTPATIENT
Start: 2024-10-20

## 2024-10-20 RX ORDER — APIXABAN 5 MG (74)
KIT ORAL
Qty: 1 EACH | Refills: 0 | Status: SHIPPED | OUTPATIENT
Start: 2024-10-20 | End: 2024-10-20

## 2024-10-20 RX ADMIN — POTASSIUM PHOSPHATE, MONOBASIC 500 MG: 500 TABLET, SOLUBLE ORAL at 01:10

## 2024-10-20 RX ADMIN — MAGNESIUM SULFATE HEPTAHYDRATE 2 G: 40 INJECTION, SOLUTION INTRAVENOUS at 01:10

## 2024-10-20 RX ADMIN — POTASSIUM PHOSPHATE, MONOBASIC 500 MG: 500 TABLET, SOLUBLE ORAL at 11:10

## 2024-10-20 RX ADMIN — MAGNESIUM SULFATE HEPTAHYDRATE 2 G: 40 INJECTION, SOLUTION INTRAVENOUS at 11:10

## 2024-10-20 NOTE — TELEPHONE ENCOUNTER
On steroids for acute colitis. Feet and ankles were and still are swollen. Now having seepage out of skin at calf on left leg with noted swelling on both. Swelling stops a little below the calf muscle. Fractured left leg 8 weeks ago and had rods and screws placed. Resulted in acute colitis and feet were swollen then and did get better from the colitis.   Reason for Disposition   SEVERE leg swelling (e.g., swelling extends above knee, entire leg is swollen, weeping fluid)    Additional Information   Negative: SEVERE difficulty breathing (e.g., struggling for each breath, speaks in single words)   Negative: Looks like a broken bone or dislocated joint (e.g., crooked or deformed)   Negative: Sounds like a life-threatening emergency to the triager   Negative: Chest pain   Negative: Followed a leg injury   Negative: [1] Followed an insect bite AND [2] localized swelling (e.g., small area of puffy or swollen skin)   Negative: Swelling of one ankle joint   Negative: Swelling of knee is main symptom   Negative: Pregnant   Negative: Postpartum (from 0 to 6 weeks after delivery)   Negative: [1] Heart failure suspected (e.g., ankle swelling, shortness of breath, weight gain) AND [2] recently in hospital for heart failure   Negative: Difficulty breathing at rest   Negative: Entire foot is cool or blue in comparison to other side   Negative: [1] Can't walk or can barely walk AND [2] new-onset   Negative: [1] Difficulty breathing with exertion (e.g., walking) AND [2] new-onset or getting worse   Negative: [1] Red area or streak AND [2] fever   Negative: [1] Swelling is painful to touch AND [2] fever   Negative: [1] Cast on leg or ankle AND [2] now increased pain   Negative: Patient sounds very sick or weak to the triager    Protocols used: Leg Swelling and Edema-A-AH

## 2024-10-21 ENCOUNTER — TELEPHONE (OUTPATIENT)
Dept: FAMILY MEDICINE | Facility: CLINIC | Age: 69
End: 2024-10-21
Payer: MEDICARE

## 2024-10-21 ENCOUNTER — PATIENT OUTREACH (OUTPATIENT)
Dept: EMERGENCY MEDICINE | Facility: HOSPITAL | Age: 69
End: 2024-10-21
Payer: MEDICARE

## 2024-10-21 ENCOUNTER — PATIENT MESSAGE (OUTPATIENT)
Dept: HEMATOLOGY/ONCOLOGY | Facility: CLINIC | Age: 69
End: 2024-10-21
Payer: MEDICARE

## 2024-10-21 LAB
OHS QRS DURATION: 88 MS
OHS QTC CALCULATION: 437 MS

## 2024-10-21 NOTE — TELEPHONE ENCOUNTER
Spoke with patient she states that the swollen has went down she wasn't elevating her legs above the heart.

## 2024-10-21 NOTE — PROGRESS NOTES
Patient was seen in the ED on 10/20/24. Phoned patient to assist with Post ED Discharge Navigation. Patient stated she is doing much better. Patient declined assistance scheduling a PCP follow-up appointment.  Rashad Morales

## 2024-10-25 ENCOUNTER — LAB VISIT (OUTPATIENT)
Dept: LAB | Facility: HOSPITAL | Age: 69
End: 2024-10-25
Attending: INTERNAL MEDICINE
Payer: MEDICARE

## 2024-10-25 DIAGNOSIS — I42.0 DILATED CARDIOMYOPATHY: ICD-10-CM

## 2024-10-25 DIAGNOSIS — I50.32 CHRONIC DIASTOLIC HEART FAILURE: ICD-10-CM

## 2024-10-25 LAB
ANION GAP SERPL CALC-SCNC: 12 MMOL/L (ref 8–16)
APTT PPP: 24.1 SEC (ref 21–32)
BUN SERPL-MCNC: 17 MG/DL (ref 8–23)
CALCIUM SERPL-MCNC: 9.2 MG/DL (ref 8.7–10.5)
CHLORIDE SERPL-SCNC: 103 MMOL/L (ref 95–110)
CO2 SERPL-SCNC: 23 MMOL/L (ref 23–29)
CREAT SERPL-MCNC: 1 MG/DL (ref 0.5–1.4)
ERYTHROCYTE [DISTWIDTH] IN BLOOD BY AUTOMATED COUNT: 22.3 % (ref 11.5–14.5)
EST. GFR  (NO RACE VARIABLE): >60 ML/MIN/1.73 M^2
GLUCOSE SERPL-MCNC: 278 MG/DL (ref 70–110)
HCT VFR BLD AUTO: 32.2 % (ref 37–48.5)
HGB BLD-MCNC: 9.7 G/DL (ref 12–16)
INR PPP: 1 (ref 0.8–1.2)
MCH RBC QN AUTO: 23.8 PG (ref 27–31)
MCHC RBC AUTO-ENTMCNC: 30.1 G/DL (ref 32–36)
MCV RBC AUTO: 79 FL (ref 82–98)
PLATELET # BLD AUTO: 174 K/UL (ref 150–450)
PMV BLD AUTO: 10.5 FL (ref 9.2–12.9)
POTASSIUM SERPL-SCNC: 4 MMOL/L (ref 3.5–5.1)
PROTHROMBIN TIME: 10.9 SEC (ref 9–12.5)
RBC # BLD AUTO: 4.08 M/UL (ref 4–5.4)
SODIUM SERPL-SCNC: 138 MMOL/L (ref 136–145)
WBC # BLD AUTO: 6.89 K/UL (ref 3.9–12.7)

## 2024-10-25 PROCEDURE — 85610 PROTHROMBIN TIME: CPT | Performed by: INTERNAL MEDICINE

## 2024-10-25 PROCEDURE — 85027 COMPLETE CBC AUTOMATED: CPT | Performed by: INTERNAL MEDICINE

## 2024-10-25 PROCEDURE — 85730 THROMBOPLASTIN TIME PARTIAL: CPT | Performed by: INTERNAL MEDICINE

## 2024-10-25 PROCEDURE — 36415 COLL VENOUS BLD VENIPUNCTURE: CPT | Performed by: INTERNAL MEDICINE

## 2024-10-25 PROCEDURE — 80048 BASIC METABOLIC PNL TOTAL CA: CPT | Performed by: INTERNAL MEDICINE

## 2024-10-29 ENCOUNTER — PATIENT MESSAGE (OUTPATIENT)
Dept: ELECTROPHYSIOLOGY | Facility: CLINIC | Age: 69
End: 2024-10-29
Payer: MEDICARE

## 2024-10-29 ENCOUNTER — PATIENT MESSAGE (OUTPATIENT)
Dept: FAMILY MEDICINE | Facility: CLINIC | Age: 69
End: 2024-10-29
Payer: MEDICARE

## 2024-10-29 ENCOUNTER — TELEPHONE (OUTPATIENT)
Dept: ELECTROPHYSIOLOGY | Facility: CLINIC | Age: 69
End: 2024-10-29
Payer: MEDICARE

## 2024-10-29 DIAGNOSIS — R60.0 BILATERAL LEG EDEMA: ICD-10-CM

## 2024-10-30 ENCOUNTER — HOSPITAL ENCOUNTER (OUTPATIENT)
Dept: RADIOLOGY | Facility: HOSPITAL | Age: 69
Discharge: HOME | End: 2024-10-30
Attending: INTERNAL MEDICINE
Payer: MEDICARE

## 2024-10-30 ENCOUNTER — HOSPITAL ENCOUNTER (EMERGENCY)
Facility: HOSPITAL | Age: 69
Discharge: HOME | End: 2024-10-30
Attending: STUDENT IN AN ORGANIZED HEALTH CARE EDUCATION/TRAINING PROGRAM
Payer: MEDICARE

## 2024-10-30 VITALS
DIASTOLIC BLOOD PRESSURE: 95 MMHG | TEMPERATURE: 99 F | WEIGHT: 172 LBS | RESPIRATION RATE: 18 BRPM | HEART RATE: 104 BPM | SYSTOLIC BLOOD PRESSURE: 132 MMHG | BODY MASS INDEX: 31.46 KG/M2 | OXYGEN SATURATION: 97 %

## 2024-10-30 DIAGNOSIS — C34.91 NSCLC OF RIGHT LUNG: ICD-10-CM

## 2024-10-30 DIAGNOSIS — L03.90 CELLULITIS, UNSPECIFIED CELLULITIS SITE: Primary | ICD-10-CM

## 2024-10-30 DIAGNOSIS — R60.0 LOWER EXTREMITY EDEMA: ICD-10-CM

## 2024-10-30 LAB
ALBUMIN SERPL BCP-MCNC: 2.9 G/DL (ref 3.5–5.2)
ALP SERPL-CCNC: 111 U/L (ref 40–150)
ALT SERPL W/O P-5'-P-CCNC: 14 U/L (ref 10–44)
ANION GAP SERPL CALC-SCNC: 10 MMOL/L (ref 8–16)
AST SERPL-CCNC: 11 U/L (ref 10–40)
BASOPHILS # BLD AUTO: 0.02 K/UL (ref 0–0.2)
BASOPHILS NFR BLD: 0.4 % (ref 0–1.9)
BILIRUB SERPL-MCNC: 0.3 MG/DL (ref 0.1–1)
BNP SERPL-MCNC: 187 PG/ML (ref 0–99)
BUN SERPL-MCNC: 18 MG/DL (ref 8–23)
CALCIUM SERPL-MCNC: 8.8 MG/DL (ref 8.7–10.5)
CHLORIDE SERPL-SCNC: 105 MMOL/L (ref 95–110)
CO2 SERPL-SCNC: 22 MMOL/L (ref 23–29)
CREAT SERPL-MCNC: 0.9 MG/DL (ref 0.5–1.4)
CRP SERPL-MCNC: 21.2 MG/L (ref 0–8.2)
DIFFERENTIAL METHOD BLD: ABNORMAL
EOSINOPHIL # BLD AUTO: 0 K/UL (ref 0–0.5)
EOSINOPHIL NFR BLD: 0.4 % (ref 0–8)
ERYTHROCYTE [DISTWIDTH] IN BLOOD BY AUTOMATED COUNT: 22.4 % (ref 11.5–14.5)
ERYTHROCYTE [SEDIMENTATION RATE] IN BLOOD BY PHOTOMETRIC METHOD: 26 MM/HR (ref 0–36)
EST. GFR  (NO RACE VARIABLE): >60 ML/MIN/1.73 M^2
GLUCOSE SERPL-MCNC: 261 MG/DL (ref 70–110)
HCT VFR BLD AUTO: 32.5 % (ref 37–48.5)
HGB BLD-MCNC: 9.6 G/DL (ref 12–16)
IMM GRANULOCYTES # BLD AUTO: 0.07 K/UL (ref 0–0.04)
IMM GRANULOCYTES NFR BLD AUTO: 1.5 % (ref 0–0.5)
LYMPHOCYTES # BLD AUTO: 0.6 K/UL (ref 1–4.8)
LYMPHOCYTES NFR BLD: 13.3 % (ref 18–48)
MCH RBC QN AUTO: 23.6 PG (ref 27–31)
MCHC RBC AUTO-ENTMCNC: 29.5 G/DL (ref 32–36)
MCV RBC AUTO: 80 FL (ref 82–98)
MONOCYTES # BLD AUTO: 0.3 K/UL (ref 0.3–1)
MONOCYTES NFR BLD: 7 % (ref 4–15)
NEUTROPHILS # BLD AUTO: 3.7 K/UL (ref 1.8–7.7)
NEUTROPHILS NFR BLD: 77.4 % (ref 38–73)
NRBC BLD-RTO: 0 /100 WBC
PLATELET # BLD AUTO: 156 K/UL (ref 150–450)
PMV BLD AUTO: 10.3 FL (ref 9.2–12.9)
POCT GLUCOSE: 260 MG/DL (ref 70–110)
POCT GLUCOSE: 265 MG/DL (ref 70–110)
POTASSIUM SERPL-SCNC: 4 MMOL/L (ref 3.5–5.1)
PROT SERPL-MCNC: 6 G/DL (ref 6–8.4)
RBC # BLD AUTO: 4.06 M/UL (ref 4–5.4)
SODIUM SERPL-SCNC: 137 MMOL/L (ref 136–145)
WBC # BLD AUTO: 4.74 K/UL (ref 3.9–12.7)

## 2024-10-30 PROCEDURE — 71250 CT THORAX DX C-: CPT | Mod: 26,HCNC,, | Performed by: STUDENT IN AN ORGANIZED HEALTH CARE EDUCATION/TRAINING PROGRAM

## 2024-10-30 PROCEDURE — 85025 COMPLETE CBC W/AUTO DIFF WBC: CPT | Mod: HCNC

## 2024-10-30 PROCEDURE — 80053 COMPREHEN METABOLIC PANEL: CPT | Mod: HCNC

## 2024-10-30 PROCEDURE — 86140 C-REACTIVE PROTEIN: CPT | Mod: HCNC

## 2024-10-30 PROCEDURE — 63600175 PHARM REV CODE 636 W HCPCS: Mod: HCNC

## 2024-10-30 PROCEDURE — 85652 RBC SED RATE AUTOMATED: CPT | Mod: HCNC

## 2024-10-30 PROCEDURE — 99284 EMERGENCY DEPT VISIT MOD MDM: CPT | Mod: 25,HCNC

## 2024-10-30 PROCEDURE — 83880 ASSAY OF NATRIURETIC PEPTIDE: CPT | Mod: HCNC

## 2024-10-30 PROCEDURE — 96375 TX/PRO/DX INJ NEW DRUG ADDON: CPT | Mod: HCNC

## 2024-10-30 PROCEDURE — 82962 GLUCOSE BLOOD TEST: CPT | Mod: HCNC

## 2024-10-30 PROCEDURE — 96374 THER/PROPH/DIAG INJ IV PUSH: CPT | Mod: HCNC

## 2024-10-30 PROCEDURE — 63600175 PHARM REV CODE 636 W HCPCS: Mod: HCNC | Performed by: STUDENT IN AN ORGANIZED HEALTH CARE EDUCATION/TRAINING PROGRAM

## 2024-10-30 PROCEDURE — 71250 CT THORAX DX C-: CPT | Mod: TC,HCNC

## 2024-10-30 RX ORDER — FUROSEMIDE 10 MG/ML
40 INJECTION INTRAMUSCULAR; INTRAVENOUS
Status: COMPLETED | OUTPATIENT
Start: 2024-10-30 | End: 2024-10-30

## 2024-10-30 RX ORDER — POTASSIUM CHLORIDE 750 MG/1
10 CAPSULE, EXTENDED RELEASE ORAL DAILY
Qty: 7 CAPSULE | Refills: 0 | Status: SHIPPED | OUTPATIENT
Start: 2024-10-30 | End: 2024-11-06

## 2024-10-30 RX ORDER — CEPHALEXIN 500 MG/1
1000 CAPSULE ORAL EVERY 12 HOURS
Qty: 20 CAPSULE | Refills: 0 | Status: SHIPPED | OUTPATIENT
Start: 2024-10-30 | End: 2024-11-04

## 2024-10-30 RX ORDER — FUROSEMIDE 40 MG/1
40 TABLET ORAL DAILY
Qty: 7 TABLET | Refills: 0 | Status: SHIPPED | OUTPATIENT
Start: 2024-10-30 | End: 2024-11-06

## 2024-10-30 RX ORDER — FUROSEMIDE 40 MG/1
40 TABLET ORAL DAILY
Qty: 7 TABLET | Refills: 0 | Status: SHIPPED | OUTPATIENT
Start: 2024-10-30 | End: 2025-10-30

## 2024-10-30 RX ORDER — CEFAZOLIN SODIUM 1 G/3ML
1 INJECTION, POWDER, FOR SOLUTION INTRAMUSCULAR; INTRAVENOUS ONCE
Status: COMPLETED | OUTPATIENT
Start: 2024-10-30 | End: 2024-10-30

## 2024-10-30 RX ADMIN — CEFAZOLIN 1 G: 330 INJECTION, POWDER, FOR SOLUTION INTRAMUSCULAR; INTRAVENOUS at 11:10

## 2024-10-30 RX ADMIN — FUROSEMIDE 40 MG: 10 INJECTION, SOLUTION INTRAMUSCULAR; INTRAVENOUS at 09:10

## 2024-10-30 NOTE — DISCHARGE INSTRUCTIONS
Thank you for coming to our Emergency Department today. It is important to remember that some problems or medical conditions are difficult to diagnose and may not be found or addressed during your Emergency Department visit.  These conditions often start with non-specific symptoms and can only be diagnosed on follow up visits with your primary care physician or specialist when the symptoms continue or change. Please remember that all medical conditions can change, and we cannot predict how you will be feeling tomorrow or the next day. Return to the ER with any questions/concerns, new/concerning symptoms, worsening or failure to improve.       Be sure to follow up with your primary care doctor and review all labs/imaging/tests that were performed during your ER visit with them. It is very common for us to identify non-emergent incidental findings which must be followed up with your primary care physician.  Some labs/imaging/tests may be outside of the normal range, and require non-emergent follow-up and/or further investigation/treatment/procedures/testing to help diagnose/exclude/prevent complications or other potentially serious medical conditions. Some abnormalities may not have been discussed or addressed during your ER visit.     An ER visit does not replace a primary care visit, and many screening tests or follow-up tests cannot be ordered by an ER doctor or performed by the ER. Some tests may even require pre-approval.    If you do not have a primary care doctor, you may contact the one listed on your discharge paperwork or you may also call the [facility] Appointment Desk at [phone] , or [address] [place] [address] at  [phone] to schedule an appointment, or establish care with a primary care doctor or even a specialist and to obtain information about local resources. It is important to your health that you have a primary care doctor.    Please take all medications as directed. We have done our best to select  a medication for you that will treat your condition however, all medications may potentially have side-effects and it is impossible to predict which medications may give you side-effects or what those side-effects (if any) those medications may give you.  If you feel that you are having a negative effect or side-effect of any medication you should stop taking those medications immediately and seek medical attention. If you feel that you are having a life-threatening reaction call 911.        Do not drive, swim, climb to height, take a bath, operate heavy machinery, drink alcohol or take potentially sedating medications, sign any legal documents or make any important decisions for 24 hours if you have received any pain medications, sedatives or mood altering drugs during your ER visit or within 24 hours of taking them if they have been prescribed to you.     You can find additional resources for Dentists, hearing aids, durable medical equipment, low cost pharmacies and other resources at [email or web address]

## 2024-10-30 NOTE — ED PROVIDER NOTES
"Encounter Date: 10/30/2024       History     Chief Complaint   Patient presents with    Wound Check     Pt reports multiple wound to left lower leg x1.5 weeks accompanied by swelling to her left ankle and foot. Pt reports she was seen in the ED for these wounds previously but they "aren't getting better". Pt denies fever.      Patient is a 69-year-old female with a past medical history of diabetes, lung cancer, COPD, hypertension, hyperlipidemia who presents to the ED with complaints bilateral lower extremity edema times multiple weeks.  Patient states that she used to be on 40 mg oral Lasix daily but the prescription ran out approximately a year ago.  She states that she attempted to contact her PCP for a refill of the Lasix but has not heard back from them yet.  Patient was seen in the ED approximately 10 days ago for a similar complaint.  Patient prescribed 40 mg of oral Lasix daily for 7 days.  She states that she completed the prescription but has been out for the past 3 days.  Did notice some improvement while taking the Lasix.  Additionally during that ED visit patient was found to have a right popliteal DVT.  Patient was prescribed Eliquis and has been compliant with it since.  Additionally reports wounds to her left lower leg.  She denies fevers, chills, chest pain, shortness of breath, nausea or vomiting.        Review of patient's allergies indicates:   Allergen Reactions    Taxol [paclitaxel]      Hypersensitivity reaction to taxol, symptoms included shortness of breath, nausea, dizziness, flushing     Carboplatin Other (See Comments)     Itching and hives    Adhesive Rash     tegaderm burns and blistered skin     Past Medical History:   Diagnosis Date    AICD (automatic cardioverter/defibrillator) present     Asthma     bronchitis in past    Breast cancer 2016    right    Cardiac pacemaker     Cardiomyopathy     COPD (chronic obstructive pulmonary disease)     Diabetes mellitus, type 2     Hyperglycemia "     Hyperlipidemia     Hypertension     Malignant neoplasm of overlapping sites of female breast 2016    Nuclear sclerosis of both eyes 2020    Respiratory distress 3/12/2020     Past Surgical History:   Procedure Laterality Date    BIOPSY, WITH CT GUIDANCE Right 2023    Procedure: LUNG MASS BIOPSY, WITH CT GUIDANCE;  Surgeon: Yehuda Green MD;  Location: [facility], [place], operated by [facility] CATH LAB;  Service: Radiology;  Laterality: Right;    BREAST BIOPSY Right 2016    IDC    BREAST LUMPECTOMY Right     CARDIAC CATHETERIZATION Bilateral 2017    CARDIAC DEFIBRILLATOR PLACEMENT Left 08/10/2017    CARDIAC DEFIBRILLATOR PLACEMENT Left 2018     SECTION      x2    CHOLECYSTECTOMY      COLONOSCOPY N/A 10/3/2024    Procedure: COLONOSCOPY;  Surgeon: Wicho Serna MD;  Location: [facility] ENDO;  Service: Gastroenterology;  Laterality: N/A;    ESOPHAGOGASTRODUODENOSCOPY N/A 10/3/2024    Procedure: EGD (ESOPHAGOGASTRODUODENOSCOPY);  Surgeon: Wicho Serna MD;  Location: [facility] ENDO;  Service: Gastroenterology;  Laterality: N/A;    FEMUR BIOPSY Left 2024    Procedure: BIOPSY, FEMUR;  Surgeon: Porter Campos MD;  Location: [facility] OR 2ND FLR;  Service: Orthopedics;  Laterality: Left;    INSERTION OF TUNNELED CENTRAL VENOUS CATHETER (CVC) WITH SUBCUTANEOUS PORT Right 2020    Procedure: [id number]-[id number]-G-CATH, RIGHT;  Surgeon: Josefa Caceres MD;  Location: [facility] OR 2ND FLR;  Service: General;  Laterality: Right;  Port-a-cath placed to R. IJ    INTRAMEDULLARY RODDING OF FEMUR Left 2024    Procedure: INSERTION, INTRAMEDULLARY ANTOINE, FEMUR;  Surgeon: Porter Campos MD;  Location: [facility] OR 2ND FLR;  Service: Orthopedics;  Laterality: Left;    LUNG BIOPSY N/A 2020    Procedure: BIOPSY, LUNG;  Surgeon: [facility] Diagnostic Provider;  Location: [facility] OR;  Service: Radiology;  Laterality: N/A;  8AM START  RN PREOP 2020---COVID NEGATIVE    NAVIGATIONAL BRONCHOSCOPY N/A 10/13/2020    Procedure:  BRONCHOSCOPY, NAVIGATIONAL;  Surgeon: Jamilah Weaver MD;  [facility];  Service: Pulmonary;  Laterality: N/A;    REVISION OF IMPLANTABLE CARDIOVERTER-DEFIBRILLATOR (ICD) ELECTRODE LEAD PLACEMENT N/A 6/15/2018    Procedure: REVISION-LEAD-ICD;  Surgeon: Javy Gates MD;  Location: [facility] CATH LAB;  Service: Cardiology;  Laterality: N/A;  LBBB, Bi-V ICD HIS Ld rev, MDT, Choice, MB, 3 Prep    ROBOT-ASSISTED LAPAROSCOPIC LYMPHADENECTOMY USING DA SALVADOR XI Right 10/23/2020    Procedure: XI ROBOTIC LYMPHADENECTOMY;  Surgeon: Ramo Tucker MD;  [facility];  Service: Thoracic;  Laterality: Right;    TUBAL LIGATION       Family History   Problem Relation Name Age of Onset    Kidney disease Mother      Cataracts Mother      Kidney disease Father      Cataracts Father      No Known Problems Sister      Clotting disorder Brother      No Known Problems Maternal Aunt      No Known Problems Paternal Aunt      No Known Problems Maternal Uncle      No Known Problems Paternal Uncle      No Known Problems Maternal Grandfather      Macular degeneration Maternal Grandmother      No Known Problems Paternal Grandfather      No Known Problems Paternal Grandmother      Anxiety disorder Daughter      Anxiety disorder Son      Breast cancer Neg Hx      Ovarian cancer Neg Hx      Amblyopia Neg Hx      Blindness Neg Hx      Cancer Neg Hx      Diabetes Neg Hx      Glaucoma Neg Hx      Hypertension Neg Hx      Retinal detachment Neg Hx      Strabismus Neg Hx      Stroke Neg Hx      Thyroid disease Neg Hx       Social History     Tobacco Use    Smoking status: Former     Current packs/day: 0.00     Average packs/day: 0.5 packs/day for 40.0 years (20.0 ttl pk-yrs)     Types: Cigarettes     Start date: 1976     Quit date: 2016     Years since quittin.2     Passive exposure: Past    Smokeless tobacco: Never   Substance Use Topics    Alcohol use: Yes     Alcohol/week: 1.0 standard drink of alcohol      Types: 1 Glasses of wine per week     Comment: rare- holiday    Drug use: No     Review of Systems   Constitutional:  Negative for chills and fever.   Respiratory:  Negative for chest tightness and shortness of breath.    Cardiovascular:  Positive for leg swelling. Negative for chest pain.   Gastrointestinal:  Negative for abdominal pain and nausea.   Skin:  Positive for rash.   Neurological:  Negative for dizziness and weakness.       Physical Exam     Initial Vitals [10/30/24 0923]   BP Pulse Resp Temp SpO2   (!) 132/95 104 18 98.7 °F (37.1 °C) 97 %      MAP       --         Physical Exam    Nursing note and vitals reviewed.  Constitutional: She appears well-developed and well-nourished. She is not diaphoretic. No distress.   HENT:   Head: Normocephalic and atraumatic.   Right Ear: External ear normal.   Left Ear: External ear normal.   Eyes: Conjunctivae and EOM are normal.   Neck: No tracheal deviation present. No JVD present.   Normal range of motion.  Cardiovascular:  Normal rate and regular rhythm.           Pulmonary/Chest: No stridor. No respiratory distress. She has no wheezes. She has no rales.   Abdominal: Abdomen is soft. There is no abdominal tenderness. There is no rebound and no guarding.   Musculoskeletal:         General: Edema (2+ edema bilaterally to the level of the knees) present.      Cervical back: Normal range of motion.     Neurological: She is alert and oriented to person, place, and time.   Skin: No rash noted. There is erythema.   See picture below for wounds  Mild surrounding erythema and warmth but no tenderness   Psychiatric: She has a normal mood and affect.         ED Course   Procedures  Labs Reviewed   CBC W/ AUTO DIFFERENTIAL - Abnormal       Result Value    WBC 4.74      RBC 4.06      Hemoglobin 9.6 (*)     Hematocrit 32.5 (*)     MCV 80 (*)     MCH 23.6 (*)     MCHC 29.5 (*)     RDW 22.4 (*)     Platelets 156      MPV 10.3      Immature Granulocytes 1.5 (*)     Gran # (ANC) 3.7       Immature Grans (Abs) 0.07 (*)     Lymph # 0.6 (*)     Mono # 0.3      Eos # 0.0      Baso # 0.02      nRBC 0      Gran % 77.4 (*)     Lymph % 13.3 (*)     Mono % 7.0      Eosinophil % 0.4      Basophil % 0.4      Differential Method Automated     COMPREHENSIVE METABOLIC PANEL - Abnormal    Sodium 137      Potassium 4.0      Chloride 105      CO2 22 (*)     Glucose 261 (*)     BUN 18      Creatinine 0.9      Calcium 8.8      Total Protein 6.0      Albumin 2.9 (*)     Total Bilirubin 0.3      Alkaline Phosphatase 111      AST 11      ALT 14      eGFR >60      Anion Gap 10     B-TYPE NATRIURETIC PEPTIDE - Abnormal     (*)    C-REACTIVE PROTEIN - Abnormal    CRP 21.2 (*)    POCT GLUCOSE - Abnormal    POCT Glucose 265 (*)    POCT GLUCOSE - Abnormal    POCT Glucose 260 (*)    SEDIMENTATION RATE    Sed Rate 26            Imaging Results              X-Ray Chest AP Portable (Final result)  Result time 10/30/24 11:15:42      Final result by Enrique Gallegos MD (10/30/24 11:15:42)                   Impression:      Radiographic appearance of the chest without definite acute interval change when compared to prior study performed 10/20/2024, 09:31 hours.      Electronically signed by: Enrique Gallegos  Date:    10/30/2024  Time:    11:15               Narrative:    EXAMINATION:  XR CHEST AP PORTABLE    CLINICAL HISTORY:  Localized edema    TECHNIQUE:  Single frontal view of the chest was performed.    COMPARISON:  Chest radiograph performed 10/20/2024, 09:31 hours.    FINDINGS:  Monitoring leads overlie the chest.    Left subclavian approach AICD.    Grossly unchanged cardiomediastinal contours, again noting enlargement of the cardiac silhouette.    As seen on recent radiograph performed 10/20/2024, there is volume loss of the right lung with ill-defined right basilar opacity.  Similar small right pleural effusion.    Note is made of history of malignancy in the right lung, better assessed by prior CT imaging  performed 10/30/2024.    No acute findings in the visualized left hemithorax.    No acute findings the visualized abdomen    Osseous and soft tissue structures appear without definite acute change.                                       Medications   furosemide injection 40 mg (40 mg Intravenous Given 10/30/24 0959)   ceFAZolin injection 1 g (1 g Intravenous Given 10/30/24 7090)     Medical Decision Making  Pt seen w initial presentation of local erythema, warmth, swelling to L lower extremity. Sensitivity/pain to light touch around the erythematous area. Nontoxic appearing, VSS. No lymphangitic spread visible and no fluid pockets or fluctuant abscess noted.  2+ pitting edema to bilateral lower extremities to the level of the knees.  Normal pulses, neurovascularly intact.    Differentials include but are not limited to cellulitis, DVT, contusion, dependent edema, CHF exacerbation.     No h/o prior tx failure, fever, chronic leg ulcers, low suspicion ofo osteomyelitis or DVT. No immune compromise, bullae, pain out of proportion, or rapid progression necrotizing fasciitis. No purulence, penetrating trauma, known MRSA colonization, IV drug use, or SIRS from MRSA infection.  Patient additionally complaining of bilateral lower extremity edema.  Feeling significantly better following 40 mg of IV Lasix.  BNP reviewed and is less than prior BNP 10 days ago.  Plan of discharge with a prescription for oral Lasix.  Plan tx with keflex outpatient. Erythema outlined. Return precautions given, patient understands and agrees with plan. I discussed with the patient the diagnosis, treatment plan, indications for return to the emergency department, and for expected follow-up. The patient verbalized an understanding. The patient is asked if there are any questions or concerns. We discuss the case, until all issues are addressed to the patient's satisfaction. Patient understands and is agreeable to the plan.     Amount and/or Complexity  of Data Reviewed  Labs: ordered.  Radiology: ordered.    Risk  Prescription drug management.                                      Clinical Impression:  Final diagnoses:  [R60.0] Lower extremity edema  [L03.90] Cellulitis, unspecified cellulitis site (Primary)          ED Disposition Condition    Discharge Stable          ED Prescriptions       Medication Sig Dispense Start Date End Date Auth. Provider    cephALEXin (KEFLEX) 500 MG capsule Take 2 capsules (1,000 mg total) by mouth every 12 (twelve) hours. for 5 days 20 capsule 10/30/2024 11/4/2024 Brionna Winslow PA-C    furosemide (LASIX) 40 MG tablet Take 1 tablet (40 mg total) by mouth once daily. for 7 days 7 tablet 10/30/2024 11/6/2024 Brionna Winslow PA-C          Follow-up Information       Follow up With Specialties Details Why Contact Info    Emanuel Chavez MD Family Medicine, Wound Care   [address] [place] [address]  [place] [address]  [phone]      [facility] Emergency Dept Emergency Medicine Go to  for new or worsening symptoms [address] [facility] [place] [address] [phone]             Brionna Winslow PA-C  10/30/24 1926

## 2024-10-30 NOTE — ED NOTES
Patient presents to ED due to swelling to lower extremities, pt states she has ran out of her lasix x1 week, states she noticed weeping from left lower leg, 2 open red areas noted on left lateral lower leg.

## 2024-10-31 ENCOUNTER — PATIENT OUTREACH (OUTPATIENT)
Dept: EMERGENCY MEDICINE | Facility: HOSPITAL | Age: 69
End: 2024-10-31
Payer: MEDICARE

## 2024-10-31 ENCOUNTER — TELEPHONE (OUTPATIENT)
Dept: ELECTROPHYSIOLOGY | Facility: CLINIC | Age: 69
End: 2024-10-31
Payer: MEDICARE

## 2024-11-05 ENCOUNTER — TELEPHONE (OUTPATIENT)
Dept: ELECTROPHYSIOLOGY | Facility: CLINIC | Age: 69
End: 2024-11-05
Payer: MEDICARE

## 2024-11-05 NOTE — TELEPHONE ENCOUNTER
Called pt to check on her wound that she had on her LLE last week and had to cancel her gen change, pt reports the wound is still open and red, reports she finished her antibiotics and that her home health nurse evaluated it and was putting in a call to her PCP for wound care, denies any fevers

## 2024-11-06 ENCOUNTER — HOSPITAL ENCOUNTER (EMERGENCY)
Facility: HOSPITAL | Age: 69
Discharge: HOME | End: 2024-11-06
Attending: EMERGENCY MEDICINE
Payer: MEDICARE

## 2024-11-06 ENCOUNTER — TELEPHONE (OUTPATIENT)
Dept: ELECTROPHYSIOLOGY | Facility: CLINIC | Age: 69
End: 2024-11-06
Payer: MEDICARE

## 2024-11-06 ENCOUNTER — TELEPHONE (OUTPATIENT)
Dept: FAMILY MEDICINE | Facility: CLINIC | Age: 69
End: 2024-11-06
Payer: MEDICARE

## 2024-11-06 ENCOUNTER — EXTERNAL HOME HEALTH (OUTPATIENT)
Dept: HOME HEALTH SERVICES | Facility: HOSPITAL | Age: 69
End: 2024-11-06
Payer: MEDICARE

## 2024-11-06 VITALS
HEART RATE: 68 BPM | DIASTOLIC BLOOD PRESSURE: 60 MMHG | SYSTOLIC BLOOD PRESSURE: 123 MMHG | OXYGEN SATURATION: 96 % | WEIGHT: 165 LBS | BODY MASS INDEX: 30.36 KG/M2 | HEIGHT: 62 IN | TEMPERATURE: 98 F | RESPIRATION RATE: 18 BRPM

## 2024-11-06 DIAGNOSIS — M79.89 LEG SWELLING: ICD-10-CM

## 2024-11-06 DIAGNOSIS — Z51.89 VISIT FOR WOUND CHECK: Primary | ICD-10-CM

## 2024-11-06 DIAGNOSIS — L03.116 CELLULITIS OF LEFT LOWER EXTREMITY: ICD-10-CM

## 2024-11-06 LAB
ALBUMIN SERPL BCP-MCNC: 3.1 G/DL (ref 3.5–5.2)
ALP SERPL-CCNC: 101 U/L (ref 40–150)
ALT SERPL W/O P-5'-P-CCNC: 14 U/L (ref 10–44)
ANION GAP SERPL CALC-SCNC: 13 MMOL/L (ref 8–16)
AST SERPL-CCNC: 10 U/L (ref 10–40)
BASOPHILS # BLD AUTO: 0.02 K/UL (ref 0–0.2)
BASOPHILS NFR BLD: 0.4 % (ref 0–1.9)
BILIRUB SERPL-MCNC: 0.4 MG/DL (ref 0.1–1)
BNP SERPL-MCNC: 133 PG/ML (ref 0–99)
BUN SERPL-MCNC: 15 MG/DL (ref 8–23)
CALCIUM SERPL-MCNC: 10.1 MG/DL (ref 8.7–10.5)
CHLORIDE SERPL-SCNC: 102 MMOL/L (ref 95–110)
CO2 SERPL-SCNC: 25 MMOL/L (ref 23–29)
CREAT SERPL-MCNC: 1.1 MG/DL (ref 0.5–1.4)
CRP SERPL-MCNC: 120.7 MG/L (ref 0–8.2)
DIFFERENTIAL METHOD BLD: ABNORMAL
EOSINOPHIL # BLD AUTO: 0 K/UL (ref 0–0.5)
EOSINOPHIL NFR BLD: 0.4 % (ref 0–8)
ERYTHROCYTE [DISTWIDTH] IN BLOOD BY AUTOMATED COUNT: 21.8 % (ref 11.5–14.5)
EST. GFR  (NO RACE VARIABLE): 55 ML/MIN/1.73 M^2
GLUCOSE SERPL-MCNC: 227 MG/DL (ref 70–110)
HCT VFR BLD AUTO: 34.4 % (ref 37–48.5)
HGB BLD-MCNC: 10.4 G/DL (ref 12–16)
IMM GRANULOCYTES # BLD AUTO: 0.06 K/UL (ref 0–0.04)
IMM GRANULOCYTES NFR BLD AUTO: 1.1 % (ref 0–0.5)
LYMPHOCYTES # BLD AUTO: 1.2 K/UL (ref 1–4.8)
LYMPHOCYTES NFR BLD: 22.8 % (ref 18–48)
MCH RBC QN AUTO: 23.7 PG (ref 27–31)
MCHC RBC AUTO-ENTMCNC: 30.2 G/DL (ref 32–36)
MCV RBC AUTO: 79 FL (ref 82–98)
MONOCYTES # BLD AUTO: 0.6 K/UL (ref 0.3–1)
MONOCYTES NFR BLD: 10.3 % (ref 4–15)
NEUTROPHILS # BLD AUTO: 3.5 K/UL (ref 1.8–7.7)
NEUTROPHILS NFR BLD: 65 % (ref 38–73)
NRBC BLD-RTO: 0 /100 WBC
PLATELET # BLD AUTO: 151 K/UL (ref 150–450)
PMV BLD AUTO: 10.5 FL (ref 9.2–12.9)
POTASSIUM SERPL-SCNC: 3.8 MMOL/L (ref 3.5–5.1)
PROCALCITONIN SERPL IA-MCNC: 0.15 NG/ML
PROT SERPL-MCNC: 7.1 G/DL (ref 6–8.4)
RBC # BLD AUTO: 4.38 M/UL (ref 4–5.4)
SODIUM SERPL-SCNC: 140 MMOL/L (ref 136–145)
WBC # BLD AUTO: 5.36 K/UL (ref 3.9–12.7)

## 2024-11-06 PROCEDURE — 86140 C-REACTIVE PROTEIN: CPT | Mod: HCNC

## 2024-11-06 PROCEDURE — 87040 BLOOD CULTURE FOR BACTERIA: CPT | Mod: HCNC

## 2024-11-06 PROCEDURE — 84145 PROCALCITONIN (PCT): CPT | Mod: HCNC

## 2024-11-06 PROCEDURE — 25000003 PHARM REV CODE 250: Mod: HCNC

## 2024-11-06 PROCEDURE — 99285 EMERGENCY DEPT VISIT HI MDM: CPT | Mod: 25,HCNC

## 2024-11-06 PROCEDURE — 80053 COMPREHEN METABOLIC PANEL: CPT | Mod: HCNC

## 2024-11-06 PROCEDURE — 85025 COMPLETE CBC W/AUTO DIFF WBC: CPT | Mod: HCNC

## 2024-11-06 PROCEDURE — 83880 ASSAY OF NATRIURETIC PEPTIDE: CPT | Mod: HCNC

## 2024-11-06 RX ORDER — DOXYCYCLINE HYCLATE 100 MG
100 TABLET ORAL
Status: COMPLETED | OUTPATIENT
Start: 2024-11-06 | End: 2024-11-06

## 2024-11-06 RX ORDER — DOXYCYCLINE 100 MG/1
100 CAPSULE ORAL EVERY 12 HOURS
Qty: 14 CAPSULE | Refills: 0 | Status: SHIPPED | OUTPATIENT
Start: 2024-11-06 | End: 2024-11-13

## 2024-11-06 RX ORDER — CLINDAMYCIN PHOSPHATE 600 MG/50ML
600 INJECTION, SOLUTION INTRAVENOUS
Status: DISCONTINUED | OUTPATIENT
Start: 2024-11-06 | End: 2024-11-06

## 2024-11-06 RX ADMIN — DOXYCYCLINE HYCLATE 100 MG: 100 TABLET, COATED ORAL at 03:11

## 2024-11-06 NOTE — TELEPHONE ENCOUNTER
Spoke with patient to gave her appointment about the sores on her left leg patient decline the appointment phone [facility] nurse informed her that the patient decline the appointment with  NP Dang Golden. On 11/07/2024 @ 10:00 am patient decline   Vs

## 2024-11-06 NOTE — TELEPHONE ENCOUNTER
This nurse contacted pt to assist pt with scheduling. Pt states she is currently in ER. Pt instructed to contact clinic to schedule ER F/U OV after discharge from ER, pt verbalized understanding.

## 2024-11-06 NOTE — DISCHARGE INSTRUCTIONS
Please return to the Emergency Department for any new or worsening symptoms including: worsening redness/swelling/drainage, fever, chest pain, shortness of breath, loss of consciousness, dizziness, weakness, or any other concerns.     Please follow up with your Primary Care Provider within in the week. If you do not have one, you may contact the one listed on your discharge paperwork or you may also call the [facility] Appointment Desk at [phone] to schedule an appointment with one.     Please take all medication as prescribed.

## 2024-11-06 NOTE — TELEPHONE ENCOUNTER
----- Message from Emanuel Chavez MD sent at 11/6/2024  7:51 AM CST -----  I haven't prescribed her an antibiotic, so not sure what she is referring to.    Thanks  Dr. Chavez  ----- Message -----  From: Dinora Franco MA  Sent: 11/5/2024   1:09 PM CST  To: Emanuel Chavez MD      ----- Message -----  From: Magda Bhakta  Sent: 11/5/2024  12:17 PM CST  To: Kathy Castellanos Staff    .Type: Patient Call Back    [facility]     What is the request in detail: Patient have two red sores on left leg, sugar elevated. May need a different antibiotic. Ask that the nurse give her a call     Can the clinic reply by [facility]? No     Would the patient rather a call back or a response [facility]? Call Back     Best call back number: [phone]    Additional Information:

## 2024-11-06 NOTE — ED PROVIDER NOTES
Encounter Date: 11/6/2024       History     Chief Complaint   Patient presents with    Medication Refill     Pt presents to ER with complaints of 2 wounds to her left leg. Pt states she has ran out of antibiotics and her wounds have not gotten any better. Pt states her home health nurse saw her yesterday, cleaned the wounds and feels that they haven't gotten any better. Pt denies pain. Pt denies any other issues at this time.      69-year-old female with past medical history of hypertension, diabetes type 2, COPD, hyperlipidemia, cardiac pacemaker, AICD presents to ED for emergent evaluation of a wound check to her left lower extremity.  Patient presented to this facility on 10/30/2024 for her symptoms where she was discharged on Keflex and Lasix.  She states she finished a full course of the Keflex.  She reports worsening redness since then.  She denies any associated bleeding or drainage.  She denies any fever, chills, chest pain, shortness of breath, abdominal pain, nausea, vomiting, diarrhea, dysuria, hematuria.  She denies any recent long distance travel, surgeries, hormone therapy.  No other symptoms reported.    The history is provided by the patient. No  was used.     Review of patient's allergies indicates:   Allergen Reactions    Taxol [paclitaxel]      Hypersensitivity reaction to taxol, symptoms included shortness of breath, nausea, dizziness, flushing     Carboplatin Other (See Comments)     Itching and hives    Adhesive Rash     tegaderm burns and blistered skin     Past Medical History:   Diagnosis Date    AICD (automatic cardioverter/defibrillator) present     Asthma     bronchitis in past    Breast cancer 2016    right    Cardiac pacemaker     Cardiomyopathy     COPD (chronic obstructive pulmonary disease)     Diabetes mellitus, type 2     Hyperglycemia     Hyperlipidemia     Hypertension     Malignant neoplasm of overlapping sites of female breast 2/12/2016    Nuclear sclerosis  of both eyes 2020    Respiratory distress 3/12/2020     Past Surgical History:   Procedure Laterality Date    BIOPSY, WITH CT GUIDANCE Right 2023    Procedure: LUNG MASS BIOPSY, WITH CT GUIDANCE;  Surgeon: Yehuda Green MD;  Location: [facility] CATH LAB;  Service: Radiology;  Laterality: Right;    BREAST BIOPSY Right 2016    IDC    BREAST LUMPECTOMY Right     CARDIAC CATHETERIZATION Bilateral 2017    CARDIAC DEFIBRILLATOR PLACEMENT Left 08/10/2017    CARDIAC DEFIBRILLATOR PLACEMENT Left 2018     SECTION      x2    CHOLECYSTECTOMY      COLONOSCOPY N/A 10/3/2024    Procedure: COLONOSCOPY;  Surgeon: Wicho Serna MD;  Location: [facility] ENDO;  Service: Gastroenterology;  Laterality: N/A;    ESOPHAGOGASTRODUODENOSCOPY N/A 10/3/2024    Procedure: EGD (ESOPHAGOGASTRODUODENOSCOPY);  Surgeon: Wicho Serna MD;  Location: [facility] ENDO;  Service: Gastroenterology;  Laterality: N/A;    FEMUR BIOPSY Left 2024    Procedure: BIOPSY, FEMUR;  Surgeon: Porter Campos MD;  Location: [facility] OR 2ND FLR;  Service: Orthopedics;  Laterality: Left;    INSERTION OF TUNNELED CENTRAL VENOUS CATHETER (CVC) WITH SUBCUTANEOUS PORT Right 2020    Procedure: [id number]-CATH, RIGHT;  Surgeon: Josefa Caceres MD;  Location: [facility] OR 2ND FLR;  Service: General;  Laterality: Right;  Port-a-cath placed to R. IJ    INTRAMEDULLARY RODDING OF FEMUR Left 2024    Procedure: INSERTION, INTRAMEDULLARY ANTOINE, FEMUR;  Surgeon: Porter Campos MD;  Location: [facility] OR 2ND FLR;  Service: Orthopedics;  Laterality: Left;    LUNG BIOPSY N/A 2020    Procedure: BIOPSY, LUNG;  Surgeon: [facility];  Location: [facility] OR;  Service: Radiology;  Laterality: N/A;  8AM START  RN PREOP 2020---COVID NEGATIVE    NAVIGATIONAL BRONCHOSCOPY N/A 10/13/2020    Procedure: BRONCHOSCOPY, NAVIGATIONAL;  Surgeon: Jamilah Weaver MD;  Location: [facility] OR 2ND FLR;  Service: Pulmonary;  Laterality: N/A;     REVISION OF IMPLANTABLE CARDIOVERTER-DEFIBRILLATOR (ICD) ELECTRODE LEAD PLACEMENT N/A 6/15/2018    Procedure: REVISION-LEAD-ICD;  Surgeon: Javy Gates MD;  Location: [facility] CATH LAB;  Service: Cardiology;  Laterality: N/A;  LBBB, Bi-V ICD HIS Ld rev, MDT, Choice, MB, 3 Prep    ROBOT-ASSISTED LAPAROSCOPIC LYMPHADENECTOMY USING DA SALVADOR XI Right 10/23/2020    Procedure: XI ROBOTIC LYMPHADENECTOMY;  Surgeon: Ramo Tucker MD;  [facility];  Service: Thoracic;  Laterality: Right;    TUBAL LIGATION       Family History   Problem Relation Name Age of Onset    Kidney disease Mother      Cataracts Mother      Kidney disease Father      Cataracts Father      No Known Problems Sister      Clotting disorder Brother      No Known Problems Maternal Aunt      No Known Problems Paternal Aunt      No Known Problems Maternal Uncle      No Known Problems Paternal Uncle      No Known Problems Maternal Grandfather      Macular degeneration Maternal Grandmother      No Known Problems Paternal Grandfather      No Known Problems Paternal Grandmother      Anxiety disorder Daughter      Anxiety disorder Son      Breast cancer Neg Hx      Ovarian cancer Neg Hx      Amblyopia Neg Hx      Blindness Neg Hx      Cancer Neg Hx      Diabetes Neg Hx      Glaucoma Neg Hx      Hypertension Neg Hx      Retinal detachment Neg Hx      Strabismus Neg Hx      Stroke Neg Hx      Thyroid disease Neg Hx       Social History     Tobacco Use    Smoking status: Former     Current packs/day: 0.00     Average packs/day: 0.5 packs/day for 40.0 years (20.0 ttl pk-yrs)     Types: Cigarettes     Start date: 1976     Quit date: 2016     Years since quittin.2     Passive exposure: Past    Smokeless tobacco: Never   Substance Use Topics    Alcohol use: Yes     Alcohol/week: 1.0 standard drink of alcohol     Types: 1 Glasses of wine per week     Comment: rare- holiday    Drug use: No     Review of Systems   Constitutional:  Negative  for chills and fever.   HENT:  Negative for congestion, ear pain, rhinorrhea and sore throat.    Eyes:  Negative for redness.   Respiratory:  Negative for cough and shortness of breath.    Cardiovascular:  Negative for chest pain.   Gastrointestinal:  Negative for abdominal pain, diarrhea, nausea and vomiting.   Genitourinary:  Negative for decreased urine volume, difficulty urinating, dysuria, frequency, hematuria and urgency.   Musculoskeletal:  Negative for back pain and neck pain.   Skin:  Positive for wound. Negative for rash.   Neurological:  Negative for headaches.   Psychiatric/Behavioral:  Negative for confusion.        Physical Exam     Initial Vitals [11/06/24 1150]   BP Pulse Resp Temp SpO2   131/67 95 20 98.2 °F (36.8 °C) 95 %      MAP       --         Physical Exam    Nursing note and vitals reviewed.  Constitutional: She appears well-developed and well-nourished.  Non-toxic appearance. She does not appear ill.   HENT:   Head: Normocephalic and atraumatic.   Right Ear: Hearing, tympanic membrane, external ear and ear canal normal. Tympanic membrane is not perforated, not erythematous and not bulging.   Left Ear: Hearing, tympanic membrane, external ear and ear canal normal. Tympanic membrane is not perforated, not erythematous and not bulging.   Nose: Nose normal. Mouth/Throat: Uvula is midline, oropharynx is clear and moist and mucous membranes are normal.   Eyes: Conjunctivae and EOM are normal.   Neck: Neck supple.   Normal range of motion.   Full passive range of motion without pain.     Cardiovascular:  Normal rate and regular rhythm.           Pulses:       Radial pulses are 2+ on the right side and 2+ on the left side.   Pulmonary/Chest: Effort normal and breath sounds normal. No accessory muscle usage. No respiratory distress. She has no decreased breath sounds.   Abdominal: Abdomen is soft. Bowel sounds are normal. She exhibits no distension. There is no abdominal tenderness. There is no  rebound and no guarding.   Musculoskeletal:         General: Normal range of motion.      Cervical back: Full passive range of motion without pain, normal range of motion and neck supple. No rigidity.      Comments: Full range motion of bilateral hips, knees, ankles, toes.  Strength and sensation intact to bilateral lower extremities.  Patient is ambulating with a walker.     Neurological: She is alert. No cranial nerve deficit.   Neuro intact.  Strength and sensation intact bilateral upper and lower extremities.   Skin: Skin is warm and dry.   Patient has 2 wounds with scabbing noted to left lower extremity with some surrounding erythema.  No associated bleeding or drainage.  Please see attached photos.  Patient has 2+ pitting edema noted to bilateral lower extremities.  Swelling worse to left lower extremity compare to right lower extremity.  Equal distal pulses bilaterally.   Psychiatric: She has a normal mood and affect.               ED Course   Procedures  Labs Reviewed   CBC W/ AUTO DIFFERENTIAL - Abnormal       Result Value    WBC 5.36      RBC 4.38      Hemoglobin 10.4 (*)     Hematocrit 34.4 (*)     MCV 79 (*)     MCH 23.7 (*)     MCHC 30.2 (*)     RDW 21.8 (*)     Platelets 151      MPV 10.5      Immature Granulocytes 1.1 (*)     Gran # (ANC) 3.5      Immature Grans (Abs) 0.06 (*)     Lymph # 1.2      Mono # 0.6      Eos # 0.0      Baso # 0.02      nRBC 0      Gran % 65.0      Lymph % 22.8      Mono % 10.3      Eosinophil % 0.4      Basophil % 0.4      Differential Method Automated     COMPREHENSIVE METABOLIC PANEL - Abnormal    Sodium 140      Potassium 3.8      Chloride 102      CO2 25      Glucose 227 (*)     BUN 15      Creatinine 1.1      Calcium 10.1      Total Protein 7.1      Albumin 3.1 (*)     Total Bilirubin 0.4      Alkaline Phosphatase 101      AST 10      ALT 14      eGFR 55 (*)     Anion Gap 13     C-REACTIVE PROTEIN - Abnormal    .7 (*)    B-TYPE NATRIURETIC PEPTIDE - Abnormal      (*)    CULTURE, BLOOD   CULTURE, BLOOD   PROCALCITONIN    Procalcitonin 0.15            Imaging Results              X-Ray Chest AP Portable (Final result)  Result time 11/06/24 14:51:11      Final result by Yvon Mancuso MD (11/06/24 14:51:11)                   Impression:      As above.      Electronically signed by: Yvon Mancuso  Date:    11/06/2024  Time:    14:51               Narrative:    EXAMINATION:  XR CHEST AP PORTABLE    CLINICAL HISTORY:  Other specified soft tissue disorders    TECHNIQUE:  Single frontal view of the chest was performed.    COMPARISON:  Chest radiograph 10/30/2024    FINDINGS:  Lines and tubes: Left chest wall biventricular AICD.    Heart and mediastinum: Unremarkable.    Pleura: Small right pleural effusion.  No pneumothorax.    Lungs: Volume loss in the right lung.  Bandlike and consolidative opacities in the right lung base, likely atelectasis in the setting of an effusion.  No consolidation on the left.    Soft tissue/bone: Unchanged.                                       US Lower Extremity Veins Left (Final result)  Result time 11/06/24 14:40:49      Final result by Yvon Mancuso MD (11/06/24 14:40:49)                   Impression:      No evidence of deep venous thrombosis in the left lower extremity.    2.2 cm left popliteal fossa cyst.      Electronically signed by: Yvon Mancuso  Date:    11/06/2024  Time:    14:40               Narrative:    EXAMINATION:  US LOWER EXTREMITY VEINS LEFT    CLINICAL HISTORY:  Other specified soft tissue disorders    TECHNIQUE:  Duplex and color flow Doppler evaluation and graded compression of the left lower extremity veins was performed.    COMPARISON:  Lower extremity venous ultrasound 10/20/2024    FINDINGS:  Left thigh veins: The common femoral, femoral, popliteal, upper greater saphenous, and deep femoral veins are patent and free of thrombus. The veins are normally compressible and have normal phasic flow and augmentation  response.    Left calf veins: The visualized calf veins are patent.    Contralateral CFV: The contralateral (right) common femoral vein is patent and free of thrombus.    Miscellaneous: 2.2 x 1.4 x 0.9 cm cystic lesion in the left popliteal fossa.                                       Medications   doxycycline tablet 100 mg (has no administration in time range)     Medical Decision Making  This is a 69-year-old female with past medical history of hypertension, diabetes type 2, COPD, hyperlipidemia, cardiac pacemaker, AICD presents to ED for emergent evaluation of a wound check to her left lower extremity.  Patient presented to this facility on 10/30/2024 for her symptoms where she was discharged on Keflex and Lasix.  She states she finished a full course of the Keflex.  She reports worsening redness since then.  She denies any associated bleeding or drainage.  She denies any fever, chills, chest pain, shortness of breath, abdominal pain, nausea, vomiting, diarrhea, dysuria, hematuria.  She denies any recent long distance travel, surgeries, hormone therapy.  No other symptoms reported.    On physical exam, patient is well-appearing and in no acute distress.  Nontoxic appearing.  Lungs are clear to auscultation bilaterally.  Abdomen is soft and nontender.  No guarding, rigidity, rebound.  2+ radial pulses bilaterally.  Posterior oropharynx is not erythematous.  No edema or exudate.  Uvula midline.  Bilateral tympanic membrane is normal.  No erythema, bulging, or perforations.  Neuro intact.  Strength and sensation intact bilateral upper and lower extremities. 2+ DP pulses bilaterally. Patient has 2 wounds with scabbing noted to left lower extremity with some surrounding erythema.  No associated bleeding or drainage.  Please see attached photos.  Patient has 2+ pitting edema noted to bilateral lower extremities.  Swelling worse to left lower extremity compare to right lower extremity.  Equal distal pulses bilaterally.   Full range motion of bilateral hips, knees, ankles, toes.  Strength and sensation intact to bilateral lower extremities.  Patient is ambulating with a walker.  CBC without evidence of any leukocytosis.  Hemoglobin 10.4, hematocrit 34.4.  CMP revealed glucose 227, albumin 3.1, GFR 55.  .7.  Blood cultures pending.  .  Pro count 0.15.  Ultrasound revealed: No evidence of deep venous thrombosis in the left lower extremity.     2.2 cm left popliteal fossa cyst.     Chest x-ray revealed: Lines and tubes: Left chest wall biventricular AICD.     Heart and mediastinum: Unremarkable.     Pleura: Small right pleural effusion.  No pneumothorax.     Lungs: Volume loss in the right lung.  Bandlike and consolidative opacities in the right lung base, likely atelectasis in the setting of an effusion.  No consolidation on the left.     Soft tissue/bone: Unchanged.     Looking at patient's previous chest x-ray on 10/30/2024, I see similar bandlike and consolidative opacities to the right lung base.  Patient is not having any chest pain or shortness of breath.  I doubt infectious process at this time.  Gave patient strict return precautions if she starts getting chest pain or shortness of breath.  Also advised patient to present back to this facility after 48 hours of doxycycline if the redness worsens for IV antibiotics and admission.  Will discharge patient on doxycycline.  Ambulatory referral to wound care ordered.  Urged prompt follow-up with wound care and PCP for further evaluation.    Strict return precautions given. I discussed with the patient/family the diagnosis, treatment plan, indications for return to the emergency department, and for expected follow-up. The patient/family verbalized an understanding. The patient/family is asked if there are any questions or concerns. We discuss the case, until all issues are addressed to the patient/family's satisfaction. Patient/family understands and is agreeable to the  plan. Patient is stable and ready for discharge.      Case discussed with my attending, Dr. Borges, who personally evaluated the patient.  He is in agreement with the plan and disposition.    Amount and/or Complexity of Data Reviewed  Labs: ordered.  Radiology: ordered.    Risk  Prescription drug management.                                      Clinical Impression:  Final diagnoses:  [M79.89] Leg swelling  [Z51.89] Visit for wound check (Primary)  [L03.116] Cellulitis of left lower extremity          ED Disposition Condition    Discharge Stable          ED Prescriptions       Medication Sig Dispense Start Date End Date Auth. Provider    doxycycline (VIBRAMYCIN) 100 MG Cap Take 1 capsule (100 mg total) by mouth every 12 (twelve) hours. for 7 days 14 capsule 11/6/2024 11/13/2024 Zeynep Benítez PA-C          Follow-up Information       Follow up With Specialties Details Why Contact Info    Kathy, Emanuel MOSQUERA MD Family Medicine, Wound Care In 2 days for further evaluation [address] [facility] [address]  [phone]      [facility] - Emergency Dept Emergency Medicine In 2 days If symptoms worsen [address] [facility] [place] [id number] [phone]             Zeynep Benítez PA-C  11/06/24 1520       Zeynep Benítez PA-C  11/06/24 1526

## 2024-11-07 ENCOUNTER — PATIENT MESSAGE (OUTPATIENT)
Dept: ELECTROPHYSIOLOGY | Facility: CLINIC | Age: 69
End: 2024-11-07
Payer: MEDICARE

## 2024-11-07 ENCOUNTER — TELEPHONE (OUTPATIENT)
Dept: ELECTROPHYSIOLOGY | Facility: CLINIC | Age: 69
End: 2024-11-07
Payer: MEDICARE

## 2024-11-07 NOTE — TELEPHONE ENCOUNTER
----- Message from Soraida sent at 11/6/2024  4:36 PM CST -----  Regarding: return call  Pt is returning your call and can be reached at [phone].    Thank you

## 2024-11-07 NOTE — TELEPHONE ENCOUNTER
Spoke with pt yesterday afternoon, reports she went to ER for the wound on her leg, was given a new antibiotic, reports her home health nurse is coming today and that wound care is going to her home on Friday, rescheduled gen change for 11/20

## 2024-11-08 ENCOUNTER — PATIENT MESSAGE (OUTPATIENT)
Dept: ENDOCRINOLOGY | Facility: CLINIC | Age: 69
End: 2024-11-08
Payer: MEDICARE

## 2024-11-08 ENCOUNTER — TELEPHONE (OUTPATIENT)
Dept: HEMATOLOGY/ONCOLOGY | Facility: CLINIC | Age: 69
End: 2024-11-08
Payer: MEDICARE

## 2024-11-08 DIAGNOSIS — Z79.4 TYPE 2 DIABETES MELLITUS WITHOUT COMPLICATION, WITH LONG-TERM CURRENT USE OF INSULIN: Primary | ICD-10-CM

## 2024-11-08 DIAGNOSIS — E11.9 TYPE 2 DIABETES MELLITUS WITHOUT COMPLICATION, WITH LONG-TERM CURRENT USE OF INSULIN: Primary | ICD-10-CM

## 2024-11-08 RX ORDER — INSULIN GLARGINE 100 [IU]/ML
12 INJECTION, SOLUTION SUBCUTANEOUS NIGHTLY
Qty: 15 ML | Refills: 1 | Status: SHIPPED | OUTPATIENT
Start: 2024-11-08 | End: 2025-11-08

## 2024-11-08 RX ORDER — PEN NEEDLE, DIABETIC 31 GX5/16"
NEEDLE, DISPOSABLE MISCELLANEOUS
Qty: 50 EACH | Refills: 3 | Status: SHIPPED | OUTPATIENT
Start: 2024-11-08

## 2024-11-08 NOTE — TELEPHONE ENCOUNTER
"Spoke with pt regarding colitis symptoms and rescheduling f/u. Pt reports no more episodes of diarrhea. Denies abd pain and N/V. BM have returned to normal since finishing her steroids. Pt states that the prednisone taper started to cause her to have cellulitis in her LE and BG increases into the 400s, so she was switched to a lower dose steroid to finish. Pt currently receiving  wound care for two ulcers on LE. Pt in touch with her endocrinologist to address BG. When asked if the patient would be willing to r/s entyvio infusion, pt states "No. I want to get my cellulitis and blood sugar under control first. I do not want to come for another infusion. I don't have time." Explained to pt that the infusion is to help prevent recurrence of colitis with subsequent immunotherapy treatments, pt verbalized understanding. Pt knows to reach out if diarrhea returns or any other symptoms arise.   "

## 2024-11-09 DIAGNOSIS — I10 BENIGN ESSENTIAL HTN: ICD-10-CM

## 2024-11-09 NOTE — TELEPHONE ENCOUNTER
No care due was identified.  [facility] Embedded Care Due Messages. Reference number: [id number].   11/09/2024 10:06:17 AM CST

## 2024-11-10 LAB
BACTERIA BLD CULT: NORMAL
BACTERIA BLD CULT: NORMAL

## 2024-11-11 ENCOUNTER — OFFICE VISIT (OUTPATIENT)
Dept: FAMILY MEDICINE | Facility: CLINIC | Age: 69
End: 2024-11-11
Payer: MEDICARE

## 2024-11-11 ENCOUNTER — DOCUMENT SCAN (OUTPATIENT)
Dept: HOME HEALTH SERVICES | Facility: HOSPITAL | Age: 69
End: 2024-11-11
Payer: MEDICARE

## 2024-11-11 VITALS
BODY MASS INDEX: 30.51 KG/M2 | TEMPERATURE: 98 F | WEIGHT: 165.81 LBS | HEART RATE: 98 BPM | OXYGEN SATURATION: 95 % | SYSTOLIC BLOOD PRESSURE: 119 MMHG | HEIGHT: 62 IN | DIASTOLIC BLOOD PRESSURE: 84 MMHG

## 2024-11-11 DIAGNOSIS — E11.9 TYPE 2 DIABETES MELLITUS WITHOUT COMPLICATION, WITHOUT LONG-TERM CURRENT USE OF INSULIN: ICD-10-CM

## 2024-11-11 DIAGNOSIS — E11.36 TYPE 2 DIABETES MELLITUS WITH DIABETIC CATARACT, WITHOUT LONG-TERM CURRENT USE OF INSULIN: ICD-10-CM

## 2024-11-11 DIAGNOSIS — I10 ESSENTIAL HYPERTENSION: ICD-10-CM

## 2024-11-11 DIAGNOSIS — Z09 HOSPITAL DISCHARGE FOLLOW-UP: Primary | ICD-10-CM

## 2024-11-11 DIAGNOSIS — I82.431 ACUTE DEEP VEIN THROMBOSIS (DVT) OF RIGHT POPLITEAL VEIN: ICD-10-CM

## 2024-11-11 DIAGNOSIS — R60.0 BILATERAL LEG EDEMA: ICD-10-CM

## 2024-11-11 PROCEDURE — 3288F FALL RISK ASSESSMENT DOCD: CPT | Mod: CPTII,S$GLB,,

## 2024-11-11 PROCEDURE — 3074F SYST BP LT 130 MM HG: CPT | Mod: CPTII,S$GLB,,

## 2024-11-11 PROCEDURE — 99999 PR PBB SHADOW E&M-EST. PATIENT-LVL V: CPT | Mod: PBBFAC,HCNC,,

## 2024-11-11 PROCEDURE — 1126F AMNT PAIN NOTED NONE PRSNT: CPT | Mod: CPTII,S$GLB,,

## 2024-11-11 PROCEDURE — 4010F ACE/ARB THERAPY RXD/TAKEN: CPT | Mod: CPTII,S$GLB,,

## 2024-11-11 PROCEDURE — 1100F PTFALLS ASSESS-DOCD GE2>/YR: CPT | Mod: CPTII,S$GLB,,

## 2024-11-11 PROCEDURE — 3008F BODY MASS INDEX DOCD: CPT | Mod: CPTII,S$GLB,,

## 2024-11-11 PROCEDURE — 99214 OFFICE O/P EST MOD 30 MIN: CPT | Mod: S$GLB,,,

## 2024-11-11 PROCEDURE — 3079F DIAST BP 80-89 MM HG: CPT | Mod: CPTII,S$GLB,,

## 2024-11-11 PROCEDURE — 3072F LOW RISK FOR RETINOPATHY: CPT | Mod: CPTII,S$GLB,,

## 2024-11-11 PROCEDURE — 1160F RVW MEDS BY RX/DR IN RCRD: CPT | Mod: CPTII,S$GLB,,

## 2024-11-11 PROCEDURE — 3044F HG A1C LEVEL LT 7.0%: CPT | Mod: CPTII,S$GLB,,

## 2024-11-11 PROCEDURE — 1157F ADVNC CARE PLAN IN RCRD: CPT | Mod: CPTII,S$GLB,,

## 2024-11-11 PROCEDURE — 1159F MED LIST DOCD IN RCRD: CPT | Mod: CPTII,S$GLB,,

## 2024-11-11 RX ORDER — AMLODIPINE BESYLATE 5 MG/1
TABLET ORAL
Qty: 90 TABLET | Refills: 2 | Status: SHIPPED | OUTPATIENT
Start: 2024-11-11

## 2024-11-11 NOTE — PATIENT INSTRUCTIONS
Increased metformin 1g twice daily. Start insulin after your education on Thursday.     Keep f/u appt with wound care.

## 2024-11-11 NOTE — PROGRESS NOTES
HPI     Chief Complaint:  Chief Complaint   Patient presents with    Follow-up     Emergency room sores on left leg        Hannah Montoya is a 69 y.o. female with multiple medical diagnoses as listed in the medical history and problem list that presents for   Chief Complaint   Patient presents with    Follow-up     Emergency room sores on left leg    .   Patient is not known to me with her last appointment in this department on 10/18/2024.      Follow-up  Pertinent negatives include no arthralgias, chest pain, headaches, joint swelling, neck pain, vomiting or weakness.       In clinic with family. Doing well. [facility]  doing PT and wound care dressing. Has appt with wound care tomorrow. Was on Keflex, switched to doxy 11/6 because wound had not healed but no noticed improvement. Swelling to BLE improved. Encouraged elevation and continue compression (once wound healed). Tomorrow is last day of doxy. No fever, otherwise feeling well. Glucose has been more elevated since being on steroids, endo provider prescribed insulin, pt has education this Thursday when she plans to start. Has only been taking 1g of metformin in the morning and 500mg at night. Will increase to 1g bid until f/u care and keep a close on on glucose. Will inform clinic if glucose >300s.     Hospital encounter notes, objective/subjective data, diagnostics, and plan of care from 11/6 reviewed.    Expand All Collapse All    Encounter Date: 11/6/2024        History           Chief Complaint   Patient presents with    Medication Refill       Pt presents to ER with complaints of 2 wounds to her left leg. Pt states she has ran out of antibiotics and her wounds have not gotten any better. Pt states her home health nurse saw her yesterday, cleaned the wounds and feels that they haven't gotten any better. Pt denies pain. Pt denies any other issues at this time.       69-year-old female with past medical history of hypertension, diabetes type 2,  COPD, hyperlipidemia, cardiac pacemaker, AICD presents to ED for emergent evaluation of a wound check to her left lower extremity.  Patient presented to this facility on 10/30/2024 for her symptoms where she was discharged on Keflex and Lasix.  She states she finished a full course of the Keflex.  She reports worsening redness since then.  She denies any associated bleeding or drainage.  She denies any fever, chills, chest pain, shortness of breath, abdominal pain, nausea, vomiting, diarrhea, dysuria, hematuria.  She denies any recent long distance travel, surgeries, hormone therapy.  No other symptoms reported.     The history is provided by the patient. No  was used.            Review of patient's allergies indicates:   Allergen Reactions    Taxol [paclitaxel]         Hypersensitivity reaction to taxol, symptoms included shortness of breath, nausea, dizziness, flushing     Carboplatin Other (See Comments)       Itching and hives    Adhesive Rash       tegaderm burns and blistered skin           Past Medical History:   Diagnosis Date    AICD (automatic cardioverter/defibrillator) present      Asthma       bronchitis in past    Breast cancer 2016     right    Cardiac pacemaker      Cardiomyopathy      COPD (chronic obstructive pulmonary disease)      Diabetes mellitus, type 2      Hyperglycemia      Hyperlipidemia      Hypertension      Malignant neoplasm of overlapping sites of female breast 2/12/2016    Nuclear sclerosis of both eyes 8/12/2020    Respiratory distress 3/12/2020            Past Surgical History:   Procedure Laterality Date    BIOPSY, WITH CT GUIDANCE Right 1/24/2023     Procedure: LUNG MASS BIOPSY, WITH CT GUIDANCE;  Surgeon: Yehuda Green MD;  [facility];  Service: Radiology;  Laterality: Right;    BREAST BIOPSY Right 2/2016     IDC    BREAST LUMPECTOMY Right      CARDIAC CATHETERIZATION Bilateral 04/04/2017    CARDIAC DEFIBRILLATOR PLACEMENT Left 08/10/2017     CARDIAC DEFIBRILLATOR PLACEMENT Left 2018     SECTION         x2    CHOLECYSTECTOMY        COLONOSCOPY N/A 10/3/2024     Procedure: COLONOSCOPY;  Surgeon: Wicho Serna MD;  Location: [facility] ENDO;  Service: Gastroenterology;  Laterality: N/A;    ESOPHAGOGASTRODUODENOSCOPY N/A 10/3/2024     Procedure: EGD (ESOPHAGOGASTRODUODENOSCOPY);  Surgeon: Wicho Serna MD;  Location: [facility] ENDO;  Service: Gastroenterology;  Laterality: N/A;    FEMUR BIOPSY Left 2024     Procedure: BIOPSY, FEMUR;  Surgeon: Porter Campos MD;  Location: [address];  Service: Orthopedics;  Laterality: Left;    INSERTION OF TUNNELED CENTRAL VENOUS CATHETER (CVC) WITH SUBCUTANEOUS PORT Right 2020     Procedure: [id number], RIGHT;  Surgeon: Josefa Caceres MD;  Location: [address];  Service: General;  Laterality: Right;  Port-a-cath placed to R. IJ    INTRAMEDULLARY RODDING OF FEMUR Left 2024     Procedure: INSERTION, INTRAMEDULLARY ANTOINE, FEMUR;  Surgeon: Porter Campos MD;  Location: [address];  Service: Orthopedics;  Laterality: Left;    LUNG BIOPSY N/A 2020     Procedure: BIOPSY, LUNG;  Surgeon: [facility] Diagnostic Provider;  Location: [facility];  Service: Radiology;  Laterality: N/A;  8AM START  RN PREOP 2020---COVID NEGATIVE    NAVIGATIONAL BRONCHOSCOPY N/A 10/13/2020     Procedure: BRONCHOSCOPY, NAVIGATIONAL;  Surgeon: Jamilah Weaver MD;  [address];  Service: Pulmonary;  Laterality: N/A;    REVISION OF IMPLANTABLE CARDIOVERTER-DEFIBRILLATOR (ICD) ELECTRODE LEAD PLACEMENT N/A 6/15/2018     Procedure: REVISION-LEAD-ICD;  Surgeon: Javy Gates MD;  Location: [facility] CATH LAB;  Service: Cardiology;  Laterality: N/A;  LBBB, Bi-V ICD HIS Elmo jordan, MDT, Choice, MB, 3 Prep    ROBOT-ASSISTED LAPAROSCOPIC LYMPHADENECTOMY USING DA SALVADOR XI Right 10/23/2020     Procedure: XI ROBOTIC LYMPHADENECTOMY;  Surgeon: Ramo Tucker MD;  Location: [address]  [facility];  Service: Thoracic;  Laterality: Right;    TUBAL LIGATION                 Family History   Problem Relation Name Age of Onset    Kidney disease Mother        Cataracts Mother        Kidney disease Father        Cataracts Father        No Known Problems Sister        Clotting disorder Brother        No Known Problems Maternal Aunt        No Known Problems Paternal Aunt        No Known Problems Maternal Uncle        No Known Problems Paternal Uncle        No Known Problems Maternal Grandfather        Macular degeneration Maternal Grandmother        No Known Problems Paternal Grandfather        No Known Problems Paternal Grandmother        Anxiety disorder Daughter        Anxiety disorder Son        Breast cancer Neg Hx        Ovarian cancer Neg Hx        Amblyopia Neg Hx        Blindness Neg Hx        Cancer Neg Hx        Diabetes Neg Hx        Glaucoma Neg Hx        Hypertension Neg Hx        Retinal detachment Neg Hx        Strabismus Neg Hx        Stroke Neg Hx        Thyroid disease Neg Hx          Social History   Social History            Tobacco Use    Smoking status: Former       Current packs/day: 0.00       Average packs/day: 0.5 packs/day for 40.0 years (20.0 ttl pk-yrs)       Types: Cigarettes       Start date: 1976       Quit date: 2016       Years since quittin.2       Passive exposure: Past    Smokeless tobacco: Never   Substance Use Topics    Alcohol use: Yes       Alcohol/week: 1.0 standard drink of alcohol       Types: 1 Glasses of wine per week       Comment: rare- holiday    Drug use: No         Review of Systems   Constitutional:  Negative for chills and fever.   HENT:  Negative for congestion, ear pain, rhinorrhea and sore throat.    Eyes:  Negative for redness.   Respiratory:  Negative for cough and shortness of breath.    Cardiovascular:  Negative for chest pain.   Gastrointestinal:  Negative for abdominal pain, diarrhea, nausea and vomiting.   Genitourinary:  Negative for  decreased urine volume, difficulty urinating, dysuria, frequency, hematuria and urgency.   Musculoskeletal:  Negative for back pain and neck pain.   Skin:  Positive for wound. Negative for rash.   Neurological:  Negative for headaches.   Psychiatric/Behavioral:  Negative for confusion.          Physical Exam             Initial Vitals [11/06/24 1150]   BP Pulse Resp Temp SpO2   131/67 95 20 98.2 °F (36.8 °C) 95 %       MAP           --              Physical Exam     Nursing note and vitals reviewed.  Constitutional: She appears well-developed and well-nourished.  Non-toxic appearance. She does not appear ill.   HENT:   Head: Normocephalic and atraumatic.   Right Ear: Hearing, tympanic membrane, external ear and ear canal normal. Tympanic membrane is not perforated, not erythematous and not bulging.   Left Ear: Hearing, tympanic membrane, external ear and ear canal normal. Tympanic membrane is not perforated, not erythematous and not bulging.   Nose: Nose normal. Mouth/Throat: Uvula is midline, oropharynx is clear and moist and mucous membranes are normal.   Eyes: Conjunctivae and EOM are normal.   Neck: Neck supple.   Normal range of motion.   Full passive range of motion without pain.     Cardiovascular:  Normal rate and regular rhythm.           Pulses:       Radial pulses are 2+ on the right side and 2+ on the left side.   Pulmonary/Chest: Effort normal and breath sounds normal. No accessory muscle usage. No respiratory distress. She has no decreased breath sounds.   Abdominal: Abdomen is soft. Bowel sounds are normal. She exhibits no distension. There is no abdominal tenderness. There is no rebound and no guarding.   Musculoskeletal:         General: Normal range of motion.      Cervical back: Full passive range of motion without pain, normal range of motion and neck supple. No rigidity.      Comments: Full range motion of bilateral hips, knees, ankles, toes.  Strength and sensation intact to bilateral lower  extremities.  Patient is ambulating with a walker.      Neurological: She is alert. No cranial nerve deficit.   Neuro intact.  Strength and sensation intact bilateral upper and lower extremities.   Skin: Skin is warm and dry.   Patient has 2 wounds with scabbing noted to left lower extremity with some surrounding erythema.  No associated bleeding or drainage.  Please see attached photos.  Patient has 2+ pitting edema noted to bilateral lower extremities.  Swelling worse to left lower extremity compare to right lower extremity.  Equal distal pulses bilaterally.   Psychiatric: She has a normal mood and affect.                   ED Course   Procedures        Labs Reviewed   CBC W/ AUTO DIFFERENTIAL - Abnormal       Result Value      WBC 5.36        RBC 4.38        Hemoglobin 10.4 (*)       Hematocrit 34.4 (*)       MCV 79 (*)       MCH 23.7 (*)       MCHC 30.2 (*)       RDW 21.8 (*)       Platelets 151        MPV 10.5        Immature Granulocytes 1.1 (*)       Gran # (ANC) 3.5        Immature Grans (Abs) 0.06 (*)       Lymph # 1.2        Mono # 0.6        Eos # 0.0        Baso # 0.02        nRBC 0        Gran % 65.0        Lymph % 22.8        Mono % 10.3        Eosinophil % 0.4        Basophil % 0.4        Differential Method Automated      COMPREHENSIVE METABOLIC PANEL - Abnormal     Sodium 140        Potassium 3.8        Chloride 102        CO2 25        Glucose 227 (*)       BUN 15        Creatinine 1.1        Calcium 10.1        Total Protein 7.1        Albumin 3.1 (*)       Total Bilirubin 0.4        Alkaline Phosphatase 101        AST 10        ALT 14        eGFR 55 (*)       Anion Gap 13      C-REACTIVE PROTEIN - Abnormal     .7 (*)     B-TYPE NATRIURETIC PEPTIDE - Abnormal      (*)     CULTURE, BLOOD   CULTURE, BLOOD   PROCALCITONIN     Procalcitonin 0.15               Imaging Results                  X-Ray Chest AP Portable (Final result)  Result time 11/06/24 14:51:11            Final result by  Yvon Mancuso MD (11/06/24 14:51:11)                           Impression:        As above.        Electronically signed by:Yvon Mancuso  Date:                                        11/06/2024  Time:                                                14:51                     Narrative:     EXAMINATION:  XR CHEST AP PORTABLE     CLINICAL HISTORY:  Other specified soft tissue disorders     TECHNIQUE:  Single frontal view of the chest was performed.     COMPARISON:  Chest radiograph 10/30/2024     FINDINGS:  Lines and tubes: Left chest wall biventricular AICD.     Heart and mediastinum: Unremarkable.     Pleura: Small right pleural effusion.  No pneumothorax.     Lungs: Volume loss in the right lung.  Bandlike and consolidative opacities in the right lung base, likely atelectasis in the setting of an effusion.  No consolidation on the left.     Soft tissue/bone: Unchanged.                                                US Lower Extremity Veins Left (Final result)  Result time 11/06/24 14:40:49            Final result by Yvon Mancuso MD (11/06/24 14:40:49)                           Impression:        No evidence of deep venous thrombosis in the left lower extremity.     2.2 cm left popliteal fossa cyst.        Electronically signed by:Yvon aMncuso  Date:                                        11/06/2024  Time:                                                14:40                     Narrative:     EXAMINATION:  US LOWER EXTREMITY VEINS LEFT     CLINICAL HISTORY:  Other specified soft tissue disorders     TECHNIQUE:  Duplex and color flow Doppler evaluation and graded compression of the left lower extremity veins was performed.     COMPARISON:  Lower extremity venous ultrasound 10/20/2024     FINDINGS:  Left thigh veins: The common femoral, femoral, popliteal, upper greater saphenous, and deep femoral veins are patent and free of thrombus. The veins are normally compressible and have normal phasic flow and augmentation  response.     Left calf veins: The visualized calf veins are patent.     Contralateral CFV: The contralateral (right) common femoral vein is patent and free of thrombus.     Miscellaneous: 2.2 x 1.4 x 0.9 cm cystic lesion in the left popliteal fossa.                                               Medications   doxycycline tablet 100 mg (has no administration in time range)      Medical Decision Making  This is a 69-year-old female with past medical history of hypertension, diabetes type 2, COPD, hyperlipidemia, cardiac pacemaker, AICD presents to ED for emergent evaluation of a wound check to her left lower extremity.  Patient presented to this facility on 10/30/2024 for her symptoms where she was discharged on Keflex and Lasix.  She states she finished a full course of the Keflex.  She reports worsening redness since then.  She denies any associated bleeding or drainage.  She denies any fever, chills, chest pain, shortness of breath, abdominal pain, nausea, vomiting, diarrhea, dysuria, hematuria.  She denies any recent long distance travel, surgeries, hormone therapy.  No other symptoms reported.     On physical exam, patient is well-appearing and in no acute distress.  Nontoxic appearing.  Lungs are clear to auscultation bilaterally.  Abdomen is soft and nontender.  No guarding, rigidity, rebound.  2+ radial pulses bilaterally.  Posterior oropharynx is not erythematous.  No edema or exudate.  Uvula midline.  Bilateral tympanic membrane is normal.  No erythema, bulging, or perforations.  Neuro intact.  Strength and sensation intact bilateral upper and lower extremities. 2+ DP pulses bilaterally. Patient has 2 wounds with scabbing noted to left lower extremity with some surrounding erythema.  No associated bleeding or drainage.  Please see attached photos.  Patient has 2+ pitting edema noted to bilateral lower extremities.  Swelling worse to left lower extremity compare to right lower extremity.  Equal distal pulses  bilaterally.  Full range motion of bilateral hips, knees, ankles, toes.  Strength and sensation intact to bilateral lower extremities.  Patient is ambulating with a walker.  CBC without evidence of any leukocytosis.  Hemoglobin 10.4, hematocrit 34.4.  CMP revealed glucose 227, albumin 3.1, GFR 55.  .7.  Blood cultures pending.  .  Pro count 0.15.  Ultrasound revealed: No evidence of deep venous thrombosis in the left lower extremity.      2.2 cm left popliteal fossa cyst.     Chest x-ray revealed: Lines and tubes: Left chest wall biventricular AICD.      Heart and mediastinum: Unremarkable.      Pleura: Small right pleural effusion.  No pneumothorax.      Lungs: Volume loss in the right lung.  Bandlike and consolidative opacities in the right lung base, likely atelectasis in the setting of an effusion.  No consolidation on the left.      Soft tissue/bone: Unchanged.      Looking at patient's previous chest x-ray on 10/30/2024, I see similar bandlike and consolidative opacities to the right lung base.  Patient is not having any chest pain or shortness of breath.  I doubt infectious process at this time.  Gave patient strict return precautions if she starts getting chest pain or shortness of breath.  Also advised patient to present back to this facility after 48 hours of doxycycline if the redness worsens for IV antibiotics and admission.  Will discharge patient on doxycycline.  Ambulatory referral to wound care ordered.  Urged prompt follow-up with wound care and PCP for further evaluation.     Strict return precautions given. I discussed with the patient/family the diagnosis, treatment plan, indications for return to the emergency department, and for expected follow-up. The patient/family verbalized an understanding. The patient/family is asked if there are any questions or concerns. We discuss the case, until all issues are addressed to the patient/family's satisfaction. Patient/family understands and  is agreeable to the plan. Patient is stable and ready for discharge.       Case discussed with my attending, Dr. Borges, who personally evaluated the patient.  He is in agreement with the plan and disposition.     Amount and/or Complexity of Data Reviewed  Labs: ordered.  Radiology: ordered.     Risk  Prescription drug management.               Plan                         Clinical Impression:  Final diagnoses:  [M79.89] Leg swelling  [Z51.89] Visit for wound check (Primary)  [L03.116] Cellulitis of left lower extremity          ED Disposition Condition     Discharge Stable             ED Prescriptions         Medication Sig Dispense Start Date End Date Auth. Provider     doxycycline (VIBRAMYCIN) 100 MG Cap Take 1 capsule (100 mg total) by mouth every 12 (twelve) hours. for 7 days 14 capsule 11/6/2024 11/13/2024 Zeynep Benítez PA-C             Follow-up Information         Follow up With Specialties Details Why Contact Info     Emanuel Chavez MD Family Medicine, Wound Care In 2 days for further evaluation [address] [facility] [address]  [phone]        [facility] Emergency Medicine In 2 days If symptoms worsen [address] [facility] [place] [id number] [phone]                Zeynep Benítez PA-C  11/06/24 1520        Zeynep Benítez PA-C  11/06/24 1522         Cosigned by: Corey Borges MD at 11/8/2024 10:21 AM   Electronically signed by Zeynep Benítez PA-C at 11/6/2024  3:20 PM  Electronically signed by Zeynep Benítez PA-C at 11/6/2024  3:22 PM  Electronically signed by Corey Borges MD at 11/8/2024 1  Assessment & Plan       Problem List Items Addressed This Visit       Essential hypertension  The current medical regimen is effective. Continue medication as prescribed. BP at goal in clinic        Type 2 diabetes mellitus without complication  Keep f/u appt to start insulin therapy. Increase metformin. Monitor glucose at  home.      Other Visit Diagnoses       Hospital discharge follow-up    -  Primary  Transitional Care Note    Family and/or Caretaker present at visit?  Yes.  Diagnostic tests reviewed/disposition: I have reviewed all completed as well as pending diagnostic tests at the time of discharge.  Disease/illness education: f/u with wound care, hem/onc, endocrinology, insulin injection education  Home health/community services discussion/referrals: Patient has home health established at [facility] .   Establishment or re-establishment of referral orders for community resources: No other necessary community resources.   Discussion with other health care providers: No discussion with other health care providers necessary.           Bilateral leg edema      Elevation, compression, keep wound care f/u.     Acute deep vein thrombosis (DVT) of right popliteal vein      Currently on eliquis. The current medical regimen is effective;  continue present plan and medications.                --------------------------------------------      Health Maintenance:  Health Maintenance         Date Due Completion Date    TETANUS VACCINE Never done ---    RSV Vaccine (Age 60+ and Pregnant patients) (1 - Risk 60-74 years 1-dose series) Never done ---    Foot Exam 08/03/2021 8/3/2020 (Done)    Override on 8/3/2020: Done    Override on 1/21/2019: Done    Override on 12/28/2017: Done    Influenza Vaccine (1) 09/01/2024 10/3/2023    Override on 9/18/2017: Done (done at work)    Override on 10/3/2016: Done    Override on 10/12/2015: Done    COVID-19 Vaccine (4 - 2024-25 season) 09/01/2024 9/16/2021    Mammogram 10/02/2024 10/2/2023    Override on 6/2/2015: Done    Diabetes Urine Screening 10/16/2024 10/16/2023    Eye Exam 12/06/2024 12/6/2023    Override on 2/8/2016: Done    Hemoglobin A1c 02/19/2025 8/19/2024    Override on 6/3/2015: Done    Lipid Panel 06/06/2025 6/6/2024    Override on 6/2/2015: Done            Health maintenance  reviewed    Follow Up:  No follow-ups on file.    Discussed DDx, condition, and treatment.   Education sent to patient portal/included in after visit summary.  ED precautions given.   Notify provider if symptoms do not resolve or increase in severity.   Patient verbalizes understanding and agrees with plan of care.    Exam     Review of Systems:  (as noted above)  Review of Systems   Constitutional:  Negative for activity change and unexpected weight change.   HENT:  Negative for hearing loss, rhinorrhea and trouble swallowing.    Eyes:  Negative for discharge and visual disturbance.   Respiratory:  Negative for chest tightness and wheezing.    Cardiovascular:  Negative for chest pain and palpitations.   Gastrointestinal:  Negative for blood in stool, constipation, diarrhea and vomiting.   Endocrine: Negative for polydipsia and polyuria.   Genitourinary:  Negative for difficulty urinating, dysuria, hematuria and menstrual problem.   Musculoskeletal:  Negative for arthralgias, joint swelling and neck pain.   Neurological:  Negative for weakness and headaches.   Psychiatric/Behavioral:  Negative for confusion and dysphoric mood.        Physical Exam:   Physical Exam  Constitutional:       General: She is not in acute distress.     Appearance: Normal appearance. She is not toxic-appearing.   Cardiovascular:      Rate and Rhythm: Normal rate and regular rhythm.      Heart sounds: Normal heart sounds.   Pulmonary:      Effort: Pulmonary effort is normal.      Breath sounds: Normal breath sounds.   Musculoskeletal:      Cervical back: Normal range of motion and neck supple.      Right lower le+ Edema present.      Left lower le+ Edema (1+) present.   Skin:     General: Skin is warm.      Capillary Refill: Capillary refill takes less than 2 seconds.      Findings: Wound present. No bruising or erythema.             Comments: Two <.5cm circular healing wounds to left shin. No surrounding redness or discharge.   "  Neurological:      General: No focal deficit present.      Mental Status: She is alert and oriented to person, place, and time.   Psychiatric:         Mood and Affect: Mood normal.       Vitals:    24 1434   BP: 119/84   BP Location: Left arm   Patient Position: Sitting   Pulse: 98   Temp: 98.3 °F (36.8 °C)   TempSrc: Oral   SpO2: 95%   Weight: 75.2 kg (165 lb 12.6 oz)   Height: 5' 2" (1.575 m)      Body mass index is 30.32 kg/m².        History     Past Medical History:  Past Medical History:   Diagnosis Date    AICD (automatic cardioverter/defibrillator) present     Asthma     bronchitis in past    Breast cancer 2016    right    Cardiac pacemaker     Cardiomyopathy     COPD (chronic obstructive pulmonary disease)     Diabetes mellitus, type 2     Hyperglycemia     Hyperlipidemia     Hypertension     Malignant neoplasm of overlapping sites of female breast 2016    Nuclear sclerosis of both eyes 2020    Respiratory distress 3/12/2020       Past Surgical History:  Past Surgical History:   Procedure Laterality Date    BIOPSY, WITH CT GUIDANCE Right 2023    Procedure: LUNG MASS BIOPSY, WITH CT GUIDANCE;  Surgeon: Yehuda Green MD;  Location: [facility] CATH LAB;  Service: Radiology;  Laterality: Right;    BREAST BIOPSY Right 2016    IDC    BREAST LUMPECTOMY Right     CARDIAC CATHETERIZATION Bilateral 2017    CARDIAC DEFIBRILLATOR PLACEMENT Left 08/10/2017    CARDIAC DEFIBRILLATOR PLACEMENT Left 2018     SECTION      x2    CHOLECYSTECTOMY      COLONOSCOPY N/A 10/3/2024    Procedure: COLONOSCOPY;  Surgeon: Wicho Serna MD;  Location: [facility];  Service: Gastroenterology;  Laterality: N/A;    ESOPHAGOGASTRODUODENOSCOPY N/A 10/3/2024    Procedure: EGD (ESOPHAGOGASTRODUODENOSCOPY);  Surgeon: Wicho Serna MD;  Location: [facility] ENDO;  Service: Gastroenterology;  Laterality: N/A;    FEMUR BIOPSY Left 2024    Procedure: BIOPSY, FEMUR;  Surgeon: Porter Campos MD;  " [address]  Service: Orthopedics;  Laterality: Left;    INSERTION OF TUNNELED CENTRAL VENOUS CATHETER (CVC) WITH SUBCUTANEOUS PORT Right 2020    Procedure: [id number], RIGHT;  Surgeon: Josefa Caceres MD;  [facility];  Service: General;  Laterality: Right;  Port-a-cath placed to R. IJ    INTRAMEDULLARY RODDING OF FEMUR Left 2024    Procedure: INSERTION, INTRAMEDULLARY ANTOINE, FEMUR;  Surgeon: Porter Campos MD;  [address];  Service: Orthopedics;  Laterality: Left;    LUNG BIOPSY N/A 2020    Procedure: BIOPSY, LUNG;  Surgeon: [facility] Diagnostic Provider;  Location: [facility] OR;  Service: Radiology;  Laterality: N/A;  8AM START  RN PREOP 2020---COVID NEGATIVE    NAVIGATIONAL BRONCHOSCOPY N/A 10/13/2020    Procedure: BRONCHOSCOPY, NAVIGATIONAL;  Surgeon: Jamilah Weaver MD;  Location: [address];  Service: Pulmonary;  Laterality: N/A;    REVISION OF IMPLANTABLE CARDIOVERTER-DEFIBRILLATOR (ICD) ELECTRODE LEAD PLACEMENT N/A 6/15/2018    Procedure: REVISION-LEAD-ICD;  Surgeon: Javy Gates MD;  Location: [facility] CATH LAB;  Service: Cardiology;  Laterality: N/A;  LBBB, Bi-V ICD HIS Ld , MDT, Choice, MB, 3 Prep    ROBOT-ASSISTED LAPAROSCOPIC LYMPHADENECTOMY USING DA SALVADOR XI Right 10/23/2020    Procedure: XI ROBOTIC LYMPHADENECTOMY;  Surgeon: Ramo Tucker MD;  Location: [address];  Service: Thoracic;  Laterality: Right;    TUBAL LIGATION         Social History:  Social History     Socioeconomic History    Marital status:     Number of children: 2   Occupational History     Employer: [employer]   Tobacco Use    Smoking status: Former     Current packs/day: 0.00     Average packs/day: 0.5 packs/day for 40.0 years (20.0 ttl pk-yrs)     Types: Cigarettes     Start date: 1976     Quit date: 2016     Years since quittin.2     Passive exposure: Past    Smokeless tobacco: Never   Substance and Sexual  Activity    Alcohol use: Yes     Alcohol/week: 1.0 standard drink of alcohol     Types: 1 Glasses of wine per week     Comment: rare- holiday    Drug use: No    Sexual activity: Not Currently     Partners: Male     Comment:      Social Drivers of Health     Financial Resource Strain: Low Risk  (10/4/2024)    Overall Financial Resource Strain (CARDIA)     Difficulty of Paying Living Expenses: Not very hard   Food Insecurity: No Food Insecurity (10/4/2024)    Hunger Vital Sign     Worried About Running Out of Food in the Last Year: Never true     Ran Out of Food in the Last Year: Never true   Recent Concern: Food Insecurity - Food Insecurity Present (8/22/2024)    Hunger Vital Sign     Worried About Running Out of Food in the Last Year: Sometimes true     Ran Out of Food in the Last Year: Never true   Transportation Needs: No Transportation Needs (10/4/2024)    TRANSPORTATION NEEDS     Transportation : No   Physical Activity: Inactive (10/4/2024)    Exercise Vital Sign     Days of Exercise per Week: 0 days     Minutes of Exercise per Session: 0 min   Stress: Stress Concern Present (10/4/2024)    [employer] - Occupational Stress Questionnaire     Feeling of Stress : To some extent   Housing Stability: Low Risk  (10/4/2024)    Housing Stability Vital Sign     Unable to Pay for Housing in the Last Year: No     Homeless in the Last Year: No       Family History:  Family History   Problem Relation Name Age of Onset    Kidney disease Mother      Cataracts Mother      Kidney disease Father      Cataracts Father      No Known Problems Sister      Clotting disorder Brother      No Known Problems Maternal Aunt      No Known Problems Paternal Aunt      No Known Problems Maternal Uncle      No Known Problems Paternal Uncle      No Known Problems Maternal Grandfather      Macular degeneration Maternal Grandmother      No Known Problems Paternal Grandfather      No Known Problems Paternal  "Grandmother      Anxiety disorder Daughter      Anxiety disorder Son      Breast cancer Neg Hx      Ovarian cancer Neg Hx      Amblyopia Neg Hx      Blindness Neg Hx      Cancer Neg Hx      Diabetes Neg Hx      Glaucoma Neg Hx      Hypertension Neg Hx      Retinal detachment Neg Hx      Strabismus Neg Hx      Stroke Neg Hx      Thyroid disease Neg Hx         Allergies and Medications: (updated and reviewed)  Review of patient's allergies indicates:   Allergen Reactions    Taxol [paclitaxel]      Hypersensitivity reaction to taxol, symptoms included shortness of breath, nausea, dizziness, flushing     Carboplatin Other (See Comments)     Itching and hives    Adhesive Rash     tegaderm burns and blistered skin     Current Outpatient Medications   Medication Sig Dispense Refill    ACCU-CHEK ALFRED PLUS TEST STRP Strp USE TO TEST THREE TIMES DAILY 200 strip 3    acetaminophen (TYLENOL) 500 MG tablet Take 2 tablets (1,000 mg total) by mouth every 8 (eight) hours as needed for Pain.      albuterol (ACCUNEB) 1.25 mg/3 mL Nebu use 1 AMPULE (3ml) via NEBULIZER EVERY 6 HOURS AS NEEDED 300 mL 3    albuterol (VENTOLIN HFA) 90 mcg/actuation inhaler Inhale 2 puffs into the lungs every 4 (four) hours as needed for Wheezing or Shortness of Breath. Rescue 18 g 4    amLODIPine (NORVASC) 5 MG tablet TAKE 1 TABLET BY MOUTH EVERY DAY 90 tablet 2    apixaban (ELIQUIS DVT-PE TREAT 30D START) 5 mg (74 tabs) DsPk For the first 7 days take two 5 mg tablets twice daily.  After 7 days take one 5 mg tablet twice daily. 74 tablet 0    atorvastatin (LIPITOR) 10 MG tablet TAKE 1 TABLET BY MOUTH EVERY DAY 90 tablet 1    bacitracin 500 unit/gram Oint Apply topically 2 (two) times daily. 30 g 0    BD ULTRA-FINE GIN PEN NEEDLE 32 gauge x 5/32" Ndle For once daily insulin injections 50 each 3    benzonatate (TESSALON) 200 MG capsule Take 1 capsule (200 mg total) by mouth 3 (three) times daily as needed for Cough. 30 capsule 1    buPROPion (WELLBUTRIN " XL) 150 MG TB24 tablet TAKE 1 TABLET BY MOUTH EVERY DAY 90 tablet 3    cetirizine (ZYRTEC) 10 MG tablet Take 10 mg by mouth every evening.       cholecalciferol, vitamin D3, (VITAMIN D3) 50 mcg (2,000 unit) Tab Take 1 tablet (2,000 Units total) by mouth once daily. 30 tablet 11    doxycycline (VIBRAMYCIN) 100 MG Cap Take 1 capsule (100 mg total) by mouth every 12 (twelve) hours. for 7 days 14 capsule 0    gabapentin (NEURONTIN) 300 MG capsule Take 1 capsule (300 mg total) by mouth 3 (three) times daily. 90 capsule 11    insulin glargine U-100, Lantus, (LANTUS SOLOSTAR U-100 INSULIN) 100 unit/mL (3 mL) InPn pen Inject 12 Units into the skin every evening. 15 mL 1    losartan (COZAAR) 100 MG tablet TAKE 1 TABLET BY MOUTH EVERY DAY 90 tablet 3    metFORMIN (GLUCOPHAGE) 500 MG tablet TAKE 2 TABLETS BY MOUTH TWICE A DAY WITH MEALS (Patient taking differently: Take 1,000 mg by mouth daily with breakfast.) 360 tablet 3    metoprolol succinate (TOPROL-XL) 100 MG 24 hr tablet TAKE 1 TABLET BY MOUTH EVERY DAY 90 tablet 3    multivitamin (THERAGRAN) per tablet Take 1 tablet by mouth once daily.      ondansetron (ZOFRAN-ODT) 8 MG TbDL Take 1 tablet (8 mg total) by mouth every 8 (eight) hours as needed (nausea/vomiting). Take 1 tablet (8 mg) by mouth every 8 hours as needed for nausea/vomiting. 60 tablet 5    palonosetron (ALOXI) 0.25 mg/5 mL injection       pantoprazole (PROTONIX) 40 MG tablet TAKE 1 TABLET BY MOUTH EVERY DAY 90 tablet 3    predniSONE (DELTASONE) 20 MG tablet Take 4 tablets (80 mg total) by mouth once daily x3 days, THEN 3.5 tablets (70 mg total) once daily x7 days, THEN 3 tablets (60 mg total) once daily x7 days, THEN 2.5 tablets (50 mg total) once daily x7 days, THEN 2 tablets (40 mg total) once daily x7 days, THEN 1.5 tablets (30 mg total) once daily x7 days, THEN 1 tablet (20 mg total) once daily x7 days, THEN 0.5 tablets (10 mg total) once daily x7 days. 110 tablet 0    READI-CAT 2 2 % (w/v) suspension        READI-CAT 2 2.1 % (w/v), 2.0 % (w/w) suspension       sertraline (ZOLOFT) 100 MG tablet TAKE 1 TABLET BY MOUTH EVERY DAY IN THE EVENING 90 tablet 2    tiotropium (SPIRIVA WITH HANDIHALER) 18 mcg inhalation capsule INHALE THE CONTENTS OF 1 CAPSULE BY MOUTH EVERY DAY 90 capsule 3    WIXELA INHUB 250-50 mcg/dose diskus inhaler INHALE 1 PUFF INTO THE LUNGS 2 (TWO) TIMES DAILY. CONTROLLER 180 each 3    nystatin (MYCOSTATIN) powder Apply topically 2 (two) times daily. 60 g 1     No current facility-administered medications for this visit.     Facility-Administered Medications Ordered in Other Visits   Medication Dose Route Frequency Provider Last Rate Last Admin    fentaNYL injection 25 mcg  25 mcg Intravenous Q5 Min PRN Keesha Martins MD        haloperidol lactate injection 0.5 mg  0.5 mg Intravenous Once PRN Keesha Martins MD        HYDROmorphone injection 0.2 mg  0.2 mg Intravenous Q5 Min PRN Keesha Martins MD        ondansetron injection 4 mg  4 mg Intravenous Once PRN Keesha Martins MD        sodium chloride 0.9% flush 10 mL  10 mL Intravenous PRN Keesha Martins MD           Patient Care Team:  Emanuel Chavez MD as PCP - General (Family Medicine)  Julianne Juarez OD as Consulting Physician (Optometry)  Caridad Menard LPN as Care Coordinator  Javi Avina MD as Oncologist (Hematology and Oncology)  Felice Anderson, ShayD as Diabetes Digital Medicine Clinician (Pharmacist)  Emanuel Chavez MD as Diabetes Digital Medicine Responsible Provider (Family Medicine)  [facility] as Diabetes Digital Medicine Contract  Danitza Weir as Digital Medicine Health   Silvia Riley as ED Navigator  Rashad Morales as ED Navigator         - The patient is given an After Visit Summary that lists all medications with directions, allergies, education, orders placed during this encounter and follow-up instructions.      - I have reviewed the patient's medical information including past medical, family, and social  history sections including the medications and allergies.      - We discussed the patient's current medications.     This note was created by combination of typed  and MModal dictation.  Transcription errors may be present.  If there are any questions, please contact me.

## 2024-11-12 ENCOUNTER — HOSPITAL ENCOUNTER (OUTPATIENT)
Dept: WOUND CARE | Facility: HOSPITAL | Age: 69
Discharge: HOME | End: 2024-11-12
Attending: FAMILY MEDICINE
Payer: MEDICARE

## 2024-11-12 VITALS — HEART RATE: 118 BPM | TEMPERATURE: 98 F | DIASTOLIC BLOOD PRESSURE: 68 MMHG | SYSTOLIC BLOOD PRESSURE: 137 MMHG

## 2024-11-12 DIAGNOSIS — E11.9 TYPE 2 DIABETES MELLITUS WITHOUT COMPLICATION, WITHOUT LONG-TERM CURRENT USE OF INSULIN: ICD-10-CM

## 2024-11-12 DIAGNOSIS — L03.116 CELLULITIS OF LEFT LOWER EXTREMITY: ICD-10-CM

## 2024-11-12 PROCEDURE — 99215 OFFICE O/P EST HI 40 MIN: CPT | Mod: ,,, | Performed by: FAMILY MEDICINE

## 2024-11-12 PROCEDURE — 99213 OFFICE O/P EST LOW 20 MIN: CPT | Performed by: FAMILY MEDICINE

## 2024-11-12 RX ORDER — METFORMIN HYDROCHLORIDE 500 MG/1
500 TABLET ORAL 2 TIMES DAILY WITH MEALS
Qty: 180 TABLET | Refills: 0 | Status: SHIPPED | OUTPATIENT
Start: 2024-11-12

## 2024-11-12 NOTE — PROGRESS NOTES
[facility]                Progress Note    Subjective:       Patient ID: Hannah Montoya is a 69 y.o. female.    Chief Complaint: Wound Consult and Wound Care    Pt arrived to [facility] via wheelchair without any issues with family (daughter) significant other at side. Pt denies any fever, chills, or flu-like symptoms; denies pain/discomfort at this current time. Pt reports having these spots that 'opened up' but have been getting better. Pt reports not having any issues with drsg since last visit but also has HH that has been visiting at home. Drsg removed & wound cleaned by RN; pt arrived with hydrofera blue ready (with plastic backing) with foam then border in place. Notified MD of findings. Lidocaine 5% ointment applied to wound bed for 15 minutes. New orders received for wound care; RN applied per MD orders. Pt will return to [facility] in 1 wk. HH will follow up on Fridays when not in [facility]. AVS printed for pt.    MD note:  patient presenting to wound care for open sores to LLE that occurred after an abscess occurred.  She is currently on antibiotics and will finish them tomorrow.  No pain in the wound and there ha been no drainage from the wound.  Home health has been placing dressings.  Patient denies any fevers, chills, or sweats.  She states that her legs are always swollen given her CHF and her current cancer treatment regimen.  She states that the wounds look much better than they did last week.        Review of Systems   Constitutional:  Positive for fatigue.   HENT: Negative.     Eyes: Negative.    Respiratory: Negative.     Cardiovascular:  Positive for leg swelling.   Gastrointestinal: Negative.    Skin:  Positive for wound.         Objective:        Physical Exam  Constitutional:       Appearance: Normal appearance.   HENT:      Head: Normocephalic and atraumatic.      Right Ear: External ear normal.      Left Ear: External ear normal.      Mouth/Throat:       Mouth: Mucous membranes are moist.      Pharynx: Oropharynx is clear.   Eyes:      Extraocular Movements: Extraocular movements intact.      Conjunctiva/sclera: Conjunctivae normal.   Cardiovascular:      Rate and Rhythm: Normal rate and regular rhythm.   Pulmonary:      Effort: Pulmonary effort is normal. No respiratory distress.   Abdominal:      Palpations: Abdomen is soft.   Skin:     General: Skin is warm.      Comments: +small open sore to anterior LLE with minimal slough; no surrouding erythema/streaking; no maceration   Neurological:      Mental Status: She is alert and oriented to person, place, and time. Mental status is at baseline.         Vitals:    11/12/24 1450   BP: 137/68   Pulse: (!) 118   Temp: 97.8 °F (36.6 °C)       Assessment:           ICD-10-CM ICD-9-CM   1. Cellulitis of left lower extremity  L03.116 682.6            Wound 10/30/24 0930 Other (comment) Left anterior Leg (Active)   10/30/24 0930 Leg   Present on Original Admission: Y   Primary Wound Type: Other   Side: Left   Orientation: anterior   Wound Approximate Age at First Assessment (Weeks):    Wound Number:    Is this injury device related?:    Incision Type:    Closure Method:    Wound Description (Comments):    Type:    Additional Comments:    Ankle-Brachial Index:    Pulses:    Removal Indication and Assessment:    Wound Outcome:    Wound Image   11/12/24 1443   Dressing Appearance Moist drainage 11/12/24 1443   Drainage Amount Scant 11/12/24 1443   Drainage Characteristics/Odor Serosanguineous 11/12/24 1443   Appearance Pink;Red 11/12/24 1443   Red (%), Wound Tissue Color 70 % 11/12/24 1443   Yellow (%), Wound Tissue Color 30 % 11/12/24 1443   Periwound Area Intact 11/12/24 1443   Wound Edges Defined 11/12/24 1443   Wound Length (cm) 0.8 cm 11/12/24 1443   Wound Width (cm) 0.7 cm 11/12/24 1443   Wound Depth (cm) 0.1 cm 11/12/24 1443   Wound Volume (cm^3) 0.056 cm^3 11/12/24 1443   Wound Surface Area (cm^2) 0.56 cm^2 11/12/24 1443    Tunneling (depth (cm)/location) 0 11/12/24 1443   Undermining (depth (cm)/location) 0 11/12/24 1443   Care Cleansed with:;Sterile normal saline 11/12/24 1443   Dressing Applied 11/12/24 1443           Plan:              Tissue pathology and/or culture taken     [] Yes      [x] No  Sharp debridement performed                   [] Yes       [x] No  Labs ordered     [] Yes       [x] No  Imaging ordered    [] Yes      [x] No    Orders Placed This Encounter   Procedures    Ambulatory referral/consult to Wound Clinic     Standing Status:   Standing     Number of Occurrences:   1     Referral Priority:   Urgent     Referral Type:   Consultation     Referral Reason:   Specialty Services Required     Requested Specialty:   Wound Care     Number of Visits Requested:   1    Ambulatory referral/consult to Home Health     Standing Status:   Future     Standing Expiration Date:   12/12/2025     Referral Priority:   Routine     Referral Type:   Home Health     Referral Reason:   Specialty Services Required     Requested Specialty:   Home Health Services     Number of Visits Requested:   1    Change dressing     WOUND CARE ORDERS    Leave dressing in place till Home Health visits on Friday     To LLE wound:   Clean wound with NS.   Periwound: Cavilon   Dressing: Aquacel AG, Mepilex border   Compression: Pt to wear own personal compression socks.    Pt will return to [facility] in 1 wk. HH will follow up Friday.       Please contact Wound Care Clinic ([phone]) for any acute changes.  If after clinic hours or over the weekend, please go to the nearest ER for evaluation.        Follow up in about 1 week (around 11/19/2024) for Wound Care.     Emanuel Chavez MD

## 2024-11-14 ENCOUNTER — CLINICAL SUPPORT (OUTPATIENT)
Dept: DIABETES | Facility: CLINIC | Age: 69
End: 2024-11-14
Payer: MEDICARE

## 2024-11-14 ENCOUNTER — TELEPHONE (OUTPATIENT)
Dept: ENDOCRINOLOGY | Facility: CLINIC | Age: 69
End: 2024-11-14
Payer: MEDICARE

## 2024-11-14 DIAGNOSIS — Z95.810 PRESENCE OF AUTOMATIC (IMPLANTABLE) CARDIAC DEFIBRILLATOR: ICD-10-CM

## 2024-11-14 DIAGNOSIS — Z79.4 TYPE 2 DIABETES MELLITUS WITHOUT COMPLICATION, WITH LONG-TERM CURRENT USE OF INSULIN: ICD-10-CM

## 2024-11-14 DIAGNOSIS — E11.9 TYPE 2 DIABETES MELLITUS WITHOUT COMPLICATION, WITH LONG-TERM CURRENT USE OF INSULIN: ICD-10-CM

## 2024-11-14 PROCEDURE — G0108 DIAB MANAGE TRN  PER INDIV: HCPCS | Mod: S$GLB,,, | Performed by: INTERNAL MEDICINE

## 2024-11-14 PROCEDURE — 99999 PR PBB SHADOW E&M-EST. PATIENT-LVL I: CPT | Mod: PBBFAC,,,

## 2024-11-14 NOTE — Clinical Note
Started basal of Lantus 12 units daily - started today.  FBGs in the mid 200s - was in high 300s 2-3 weeks ago with high dose steroids (now off)  Should she continue her metformin? Normally I'd tell her yes, but recently on the steroids for acute colitis... so deferring to you.   Let me know and I will call her!  Thanks!

## 2024-11-14 NOTE — PROGRESS NOTES
Diabetes Care Specialist Progress Note  Author: Brionna Rodriguez RD, CDE  Date: 11/14/2024      Intake  Program Intake  Reason for Diabetes Program Visit:: Intervention  Type of Intervention:: Individual  Individual: Medication Training  Medication Training: Insulin Pen    Current diabetes risk level:: low    Lab Results   Component Value Date    HGBA1C 6.1 (H) 08/19/2024       In the last month, have you used the ER or been admitted to the hospital: Yes  Was the ER or hospital admission related to diabetes?: No      Current Diabetes Treatment: Oral Medications, Insulin  Oral Medication Type/Dose: metformin  Method of insulin delivery?: Injections  Injection Type: Pens  Pen Type/Dose: to start Lantus        Glucose Monitoring  Patient has CGM: No     SMBG Once daily  As high at 398 with steroids (No steroids in about 2-3 weeks)  Now down to 200s  -300s           Diabetes Self-Management Skills Assessment  Medication Skills Assessment  Patient is able to identify current diabetes medications, dosages, and appropriate timing of medications.: yes  Patient is  aware that some diabetes medications can cause low blood sugar?: Yes  Medication Skills Assessment Completed:: Yes  Assessment indicates:: Instruction Needed  Area of need?: Yes                 Diabetes Self-Management Care Plan:    Today's Diabetes Self-Management Care Plan was developed with Hannah's input. Hannah has agreed to work toward the following goal(s) to improve his/her overall diabetes control.      Care Plan: Diabetes Management   Updates made since 12/5/2023 12:00 AM        Problem: Medications         Goal: Patient Agrees to take Diabetes Medication(s) as discussed    Start Date: 11/14/2024   Expected End Date: 5/31/2025   Priority: High   Barriers: Knowledge deficit   Note:    11/14/24  Lantus insulin pen training    The patient was instructed on supplies needed, storage of insulin, precautions related to hyper/hypoglycemia. Injection sites were  discussed and patient was instructed on importance of rotating sites.         Task: Reviewed with patient all current diabetes medications and provided basic review of the purpose, dosage, frequency, side effects, and storage of both oral and injectable diabetes medications. Completed 12/4/2024        Task: Instructed patient on how to self-administer insulin with insulin pen Completed 12/4/2024        Task: Discussed guidelines for preventing, detecting and treating hypoglycemia and hyperglycemia and reviewed the importance of meal and medication timing with diabetes mediations for prevention of hypoglycemia and maximum drug benefit. Completed 12/4/2024                Follow Up Plan     F/u as needed for log review        Today's care plan and follow up schedule was discussed with patient.  Hannah verbalized understanding of the care plan, goals, and agrees to follow up plan.        The patient was encouraged to communicate with his/her health care provider/physician and care team regarding his/her condition(s) and treatment.  I provided the patient with my contact information today and encouraged to contact me via phone or [facility] Patient Portal as needed.           Length of Visit   Total Time: 45 Minutes

## 2024-11-16 ENCOUNTER — CLINICAL SUPPORT (OUTPATIENT)
Dept: CARDIOLOGY | Facility: HOSPITAL | Age: 69
End: 2024-11-16
Attending: INTERNAL MEDICINE
Payer: MEDICARE

## 2024-11-16 DIAGNOSIS — Z95.810 PRESENCE OF AUTOMATIC (IMPLANTABLE) CARDIAC DEFIBRILLATOR: ICD-10-CM

## 2024-11-16 PROCEDURE — 93295 DEV INTERROG REMOTE 1/2/MLT: CPT | Mod: ,,, | Performed by: INTERNAL MEDICINE

## 2024-11-16 PROCEDURE — 93296 REM INTERROG EVL PM/IDS: CPT | Performed by: INTERNAL MEDICINE

## 2024-11-18 ENCOUNTER — TELEPHONE (OUTPATIENT)
Dept: ELECTROPHYSIOLOGY | Facility: CLINIC | Age: 69
End: 2024-11-18
Payer: MEDICARE

## 2024-11-18 NOTE — TELEPHONE ENCOUNTER
Left message asking patient to return call to go over instructions for CRT-D gen change scheduled on 11/20/24.

## 2024-11-19 ENCOUNTER — TELEPHONE (OUTPATIENT)
Dept: ELECTROPHYSIOLOGY | Facility: CLINIC | Age: 69
End: 2024-11-19
Payer: MEDICARE

## 2024-11-19 ENCOUNTER — HOSPITAL ENCOUNTER (OUTPATIENT)
Dept: WOUND CARE | Facility: HOSPITAL | Age: 69
Discharge: HOME | End: 2024-11-19
Attending: FAMILY MEDICINE
Payer: MEDICARE

## 2024-11-19 VITALS
DIASTOLIC BLOOD PRESSURE: 63 MMHG | RESPIRATION RATE: 16 BRPM | HEART RATE: 94 BPM | SYSTOLIC BLOOD PRESSURE: 129 MMHG | TEMPERATURE: 98 F

## 2024-11-19 DIAGNOSIS — L03.116 CELLULITIS OF LEFT LOWER EXTREMITY: Primary | ICD-10-CM

## 2024-11-19 DIAGNOSIS — I42.0 DILATED CARDIOMYOPATHY: ICD-10-CM

## 2024-11-19 PROCEDURE — 99214 OFFICE O/P EST MOD 30 MIN: CPT | Mod: ,,, | Performed by: FAMILY MEDICINE

## 2024-11-19 PROCEDURE — 99213 OFFICE O/P EST LOW 20 MIN: CPT | Performed by: FAMILY MEDICINE

## 2024-11-19 NOTE — PROGRESS NOTES
[facility]                Progress Note    Subjective:       Patient ID: Hannah Montoya is a 69 y.o. female.    Chief Complaint: Wound Check    Follow Up wound care visit. Patient entered  [facility] via wheelchair pushed by nurse. Patient noted with cane for assistance.  Patient denies fever, chills, nausea, vomiting or diarrhea at present. Patient  denies pain/discomfort to wound bed at present. Patient reports HH did not come out on Friday to change bandage. Patient with bandage from last wound care clinic visit. Patient reports not having any issues with dressing  since last visit. Dressing appears without strike through drainage.Dressing removed & wound cleaned by nurse. Dressing was discarded. Wound smaller  in size since last wound care visit.Topical Lidocaine 5% ointment applied to wound bed and allowed to absorb for 15 minutes for patient comfort. Changes made to dressing order; applied per MD orders. Patient will return to [facility] in 1 week. HH will follow up  on Friday.    AVS printed and  patient was given print out of all future scheduled appointments.     MD note:  patient presenting today for follow up of wound to anterior LLE.  She had kept dressings in place, clean, and dry.  No pain in wound site.  No fevers, chills, or sweats.  No new wounds that she is aware of at this time.  She is scheduled for pacemaker battery change tomorrow.  No other concerns at this time related to her wound        Wound Check      Review of Systems   Constitutional: Negative.    HENT: Negative.     Eyes: Negative.    Cardiovascular: Negative.    Gastrointestinal: Negative.    Musculoskeletal:  Positive for arthralgias.   Skin:  Positive for wound.         Objective:        Physical Exam  Constitutional:       Appearance: Normal appearance.   HENT:      Head: Normocephalic and atraumatic.      Right Ear: External ear normal.      Left Ear: External ear normal.      Mouth/Throat:       Mouth: Mucous membranes are moist.      Pharynx: Oropharynx is clear.   Eyes:      Extraocular Movements: Extraocular movements intact.      Conjunctiva/sclera: Conjunctivae normal.   Abdominal:      General: There is no distension.      Palpations: Abdomen is soft.   Skin:     General: Skin is warm.      Comments: +ulcer to anterior LLE without excess slough or maceration; no periwound erythema; no purulent or serous drainage on exam   Neurological:      Mental Status: She is alert.         Vitals:    11/19/24 1117   BP: 129/63   Pulse: 94   Resp: 16   Temp: 97.9 °F (36.6 °C)       Assessment:           ICD-10-CM ICD-9-CM   1. Cellulitis of left lower extremity  L03.116 682.6   2. Dilated cardiomyopathy  I42.0 425.4            Wound 10/30/24 0930 Other (comment) Left anterior Leg (Active)   10/30/24 0930 Leg   Present on Original Admission: Y   Primary Wound Type: Other   Side: Left   Orientation: anterior   Wound Approximate Age at First Assessment (Weeks):    Wound Number:    Is this injury device related?:    Incision Type:    Closure Method:    Wound Description (Comments):    Type:    Additional Comments:    Ankle-Brachial Index:    Pulses:    Removal Indication and Assessment:    Wound Outcome:    Wound Image   11/19/24 1120   Dressing Appearance Intact;Dried drainage 11/19/24 1120   Drainage Amount Scant 11/19/24 1120   Drainage Characteristics/Odor Serosanguineous 11/19/24 1120   Appearance Red;Moist 11/19/24 1120   Tissue loss description Partial thickness 11/19/24 1120   Red (%), Wound Tissue Color 100 % 11/19/24 1120   Periwound Area Intact;[employer] 11/19/24 1120   Wound Edges Open;Defined 11/19/24 1120   Wound Length (cm) 0.5 cm 11/19/24 1120   Wound Width (cm) 0.4 cm 11/19/24 1120   Wound Depth (cm) 0.1 cm 11/19/24 1120   Wound Volume (cm^3) 0.02 cm^3 11/19/24 1120   Wound Surface Area (cm^2) 0.2 cm^2 11/19/24 1120   Care Cleansed with:;Antimicrobial agent;Sterile normal saline 11/19/24 1120    Dressing Applied;Other (comment) 11/19/24 1120   Periwound Care Skin barrier film applied 11/19/24 1120   Compression Other (see comments) 11/19/24 1120   Dressing Change Due 11/21/24 11/19/24 1120           Plan:              Tissue pathology and/or culture taken     [] Yes      [x] No  Sharp debridement performed                   [] Yes       [x] No  Labs ordered     [] Yes       [x] No  Imaging ordered    [] Yes      [x] No    Orders Placed This Encounter   Procedures    Change dressing     WOUND CARE ORDERS    Leave dressing in place till Home Health visits on Friday    To LLE wound:  Clean wound with NS.  Periwound: Cavilon  Dressing:  Cellerate to wound bed, Aquacel AG, Mepilex border  Compression: Pt to wear own personal compression socks.    Pt will return to [facility] in 1 wk. HH will follow up Friday.      Please contact Wound Care Clinic ([phone]) for any acute changes.  If after clinic hours or over the weekend, please go to the nearest ER for evaluation.    SUBSEQUENT HOME HEALTH ORDERS     WOUND CARE ORDERS    Leave dressing in place till Home Health visits on Friday.     To LLE wound:  Clean wound with NS.  Periwound: Cavilon  Dressing: Cellerate to wound bed, Aquacel AG, Mepilex border  Compression: Pt to wear own personal compression socks.    Pt will return to [facility] in 1 wk. HH will follow up Friday.      Please contact Wound Care Clinic ([phone]) for any acute changes.  If after clinic hours or over the weekend, please go to the nearest ER for evaluation.     Order Specific Question:   What Home Health Agency is the patient currently using?     Answer:   [facility]    Cardiac device check - In Clinic [facility] Hospital     Standing Status:   Standing     Number of Occurrences:   1        Follow up in about 1 week (around 11/26/2024) for Wound Care.     Emanuel Chavez MD

## 2024-11-19 NOTE — TELEPHONE ENCOUNTER
Spoke to patient    CONFIRMED procedure arrival time of 5:30 am    Reiterated instructions including:    -Directions to check in desk    -NPO after midnight night prior to procedure. Fasting upon arrival to the hospital the day of the procedure. Patient allowed to drink water up until 2 hours prior to arrival due to IV fluid shortage.     -High importance of HOLDING Eliquis 2 days prior to procedure (last dose on 11/18/24 morning) pt states she didn't realize she was supposed to hold 2 days, told her not to take anymore Eliquis (ok to proceed per Dr. Gates); holding Metformin on day of procedure (last dose on 11/19/24); advised to take 1/2 dose of Lantus on evening prior to procedure    - Confirms no new meds prescribed or med changes since scheduling -     -Pre-procedure LABS Reviewed, no alerts noted    -Confirmed absence or presence of implanted device/stimulator MDT CRT-D    -Confirmed no recent or current fever, cough, or shortness of breath .    -Confirmed no redness, rash, irritation, or yeast infection to skin/ chest area; pt has scab on left lower leg and completed 2 rounds of antibiotics. Dr. Gates aware and ok to proceed.    -Bathe night prior and morning prior to procedure with Hibiclens solution or an antibacterial soap      Patient verbalized understanding of above, denies any further questions and appreciated the call.

## 2024-11-20 ENCOUNTER — HOSPITAL ENCOUNTER (OUTPATIENT)
Facility: HOSPITAL | Age: 69
Discharge: HOME | End: 2024-11-20
Attending: INTERNAL MEDICINE | Admitting: INTERNAL MEDICINE
Payer: MEDICARE

## 2024-11-20 ENCOUNTER — ANESTHESIA EVENT (OUTPATIENT)
Dept: MEDSURG UNIT | Facility: HOSPITAL | Age: 69
End: 2024-11-20
Payer: MEDICARE

## 2024-11-20 ENCOUNTER — ANESTHESIA (OUTPATIENT)
Dept: MEDSURG UNIT | Facility: HOSPITAL | Age: 69
End: 2024-11-20
Payer: MEDICARE

## 2024-11-20 VITALS
WEIGHT: 165 LBS | DIASTOLIC BLOOD PRESSURE: 62 MMHG | RESPIRATION RATE: 18 BRPM | OXYGEN SATURATION: 96 % | TEMPERATURE: 97 F | HEART RATE: 79 BPM | SYSTOLIC BLOOD PRESSURE: 107 MMHG | HEIGHT: 62 IN | BODY MASS INDEX: 30.36 KG/M2

## 2024-11-20 DIAGNOSIS — I42.0 DILATED CARDIOMYOPATHY: ICD-10-CM

## 2024-11-20 DIAGNOSIS — Z95.9 CARDIAC DEVICE IN SITU: ICD-10-CM

## 2024-11-20 DIAGNOSIS — Z45.02 ENCOUNTER DUE TO AICD AT END OF BATTERY LIFE: Primary | ICD-10-CM

## 2024-11-20 DIAGNOSIS — I49.9 ARRHYTHMIA: ICD-10-CM

## 2024-11-20 LAB
OHS QRS DURATION: 114 MS
OHS QRS DURATION: 84 MS
OHS QTC CALCULATION: 471 MS
OHS QTC CALCULATION: 513 MS
POCT GLUCOSE: 126 MG/DL (ref 70–110)
POCT GLUCOSE: 159 MG/DL (ref 70–110)

## 2024-11-20 PROCEDURE — 93010 ELECTROCARDIOGRAM REPORT: CPT | Mod: ,,, | Performed by: STUDENT IN AN ORGANIZED HEALTH CARE EDUCATION/TRAINING PROGRAM

## 2024-11-20 PROCEDURE — 33264 RMVL & RPLCMT DFB GEN MLT LD: CPT | Mod: ,,, | Performed by: INTERNAL MEDICINE

## 2024-11-20 PROCEDURE — 93005 ELECTROCARDIOGRAM TRACING: CPT

## 2024-11-20 PROCEDURE — 93010 ELECTROCARDIOGRAM REPORT: CPT | Mod: ,,, | Performed by: INTERNAL MEDICINE

## 2024-11-20 PROCEDURE — 82962 GLUCOSE BLOOD TEST: CPT | Performed by: INTERNAL MEDICINE

## 2024-11-20 PROCEDURE — C1882 AICD, OTHER THAN SING/DUAL: HCPCS | Performed by: INTERNAL MEDICINE

## 2024-11-20 PROCEDURE — 37000008 HC ANESTHESIA 1ST 15 MINUTES: Performed by: INTERNAL MEDICINE

## 2024-11-20 PROCEDURE — 25000003 PHARM REV CODE 250: Performed by: NURSE ANESTHETIST, CERTIFIED REGISTERED

## 2024-11-20 PROCEDURE — 63600175 PHARM REV CODE 636 W HCPCS: Performed by: INTERNAL MEDICINE

## 2024-11-20 PROCEDURE — 63600175 PHARM REV CODE 636 W HCPCS: Performed by: NURSE ANESTHETIST, CERTIFIED REGISTERED

## 2024-11-20 PROCEDURE — 33264 RMVL & RPLCMT DFB GEN MLT LD: CPT | Performed by: INTERNAL MEDICINE

## 2024-11-20 PROCEDURE — 27201423 OPTIME MED/SURG SUP & DEVICES STERILE SUPPLY: Performed by: INTERNAL MEDICINE

## 2024-11-20 PROCEDURE — 37000009 HC ANESTHESIA EA ADD 15 MINS: Performed by: INTERNAL MEDICINE

## 2024-11-20 DEVICE — CRTD DTPA2D4 COBALT XT HF MRI DF4 USA
Type: IMPLANTABLE DEVICE | Site: CHEST | Status: FUNCTIONAL
Brand: COBALT™ XT HF CRT-D MRI SURESCAN™

## 2024-11-20 RX ORDER — HALOPERIDOL 5 MG/ML
0.5 INJECTION INTRAMUSCULAR EVERY 10 MIN PRN
Status: DISCONTINUED | OUTPATIENT
Start: 2024-11-20 | End: 2024-11-20 | Stop reason: HOSPADM

## 2024-11-20 RX ORDER — BUPIVACAINE HYDROCHLORIDE 2.5 MG/ML
INJECTION, SOLUTION EPIDURAL; INFILTRATION; INTRACAUDAL
Status: DISCONTINUED | OUTPATIENT
Start: 2024-11-20 | End: 2024-11-20 | Stop reason: HOSPADM

## 2024-11-20 RX ORDER — SODIUM CHLORIDE 0.9 % (FLUSH) 0.9 %
3 SYRINGE (ML) INJECTION
Status: DISCONTINUED | OUTPATIENT
Start: 2024-11-20 | End: 2024-11-20 | Stop reason: HOSPADM

## 2024-11-20 RX ORDER — MIDAZOLAM HYDROCHLORIDE 1 MG/ML
INJECTION INTRAMUSCULAR; INTRAVENOUS
Status: DISCONTINUED | OUTPATIENT
Start: 2024-11-20 | End: 2024-11-20

## 2024-11-20 RX ORDER — FENTANYL CITRATE 50 UG/ML
25 INJECTION, SOLUTION INTRAMUSCULAR; INTRAVENOUS EVERY 5 MIN PRN
Status: DISCONTINUED | OUTPATIENT
Start: 2024-11-20 | End: 2024-11-20 | Stop reason: HOSPADM

## 2024-11-20 RX ORDER — PROPOFOL 10 MG/ML
VIAL (ML) INTRAVENOUS CONTINUOUS PRN
Status: DISCONTINUED | OUTPATIENT
Start: 2024-11-20 | End: 2024-11-20

## 2024-11-20 RX ORDER — DEXMEDETOMIDINE HYDROCHLORIDE 100 UG/ML
INJECTION, SOLUTION INTRAVENOUS
Status: DISCONTINUED | OUTPATIENT
Start: 2024-11-20 | End: 2024-11-20

## 2024-11-20 RX ORDER — FENTANYL CITRATE 50 UG/ML
INJECTION, SOLUTION INTRAMUSCULAR; INTRAVENOUS
Status: DISCONTINUED | OUTPATIENT
Start: 2024-11-20 | End: 2024-11-20

## 2024-11-20 RX ORDER — GLUCAGON 1 MG
1 KIT INJECTION
Status: DISCONTINUED | OUTPATIENT
Start: 2024-11-20 | End: 2024-11-20 | Stop reason: HOSPADM

## 2024-11-20 RX ORDER — LIDOCAINE HYDROCHLORIDE 20 MG/ML
INJECTION INTRAVENOUS
Status: DISCONTINUED | OUTPATIENT
Start: 2024-11-20 | End: 2024-11-20

## 2024-11-20 RX ORDER — SODIUM CHLORIDE 9 MG/ML
INJECTION, SOLUTION INTRAMUSCULAR; INTRAVENOUS; SUBCUTANEOUS
Status: DISCONTINUED | OUTPATIENT
Start: 2024-11-20 | End: 2024-11-20 | Stop reason: HOSPADM

## 2024-11-20 RX ORDER — DOXYCYCLINE 100 MG/1
100 CAPSULE ORAL EVERY 12 HOURS
Qty: 10 CAPSULE | Refills: 0 | Status: SHIPPED | OUTPATIENT
Start: 2024-11-20 | End: 2024-11-25

## 2024-11-20 RX ORDER — CEFAZOLIN 2 G/1
2 INJECTION, POWDER, FOR SOLUTION INTRAMUSCULAR; INTRAVENOUS
Status: DISCONTINUED | OUTPATIENT
Start: 2024-11-20 | End: 2024-11-20 | Stop reason: HOSPADM

## 2024-11-20 RX ORDER — ACETAMINOPHEN 325 MG/1
650 TABLET ORAL EVERY 4 HOURS PRN
Status: DISCONTINUED | OUTPATIENT
Start: 2024-11-20 | End: 2024-11-20 | Stop reason: HOSPADM

## 2024-11-20 RX ORDER — ONDANSETRON HYDROCHLORIDE 2 MG/ML
INJECTION, SOLUTION INTRAVENOUS
Status: DISCONTINUED | OUTPATIENT
Start: 2024-11-20 | End: 2024-11-20

## 2024-11-20 RX ORDER — CEFAZOLIN SODIUM 1 G/3ML
INJECTION, POWDER, FOR SOLUTION INTRAMUSCULAR; INTRAVENOUS
Status: DISCONTINUED | OUTPATIENT
Start: 2024-11-20 | End: 2024-11-20

## 2024-11-20 RX ORDER — KETAMINE HCL IN 0.9 % NACL 50 MG/5 ML
SYRINGE (ML) INTRAVENOUS
Status: DISCONTINUED | OUTPATIENT
Start: 2024-11-20 | End: 2024-11-20

## 2024-11-20 RX ORDER — LIDOCAINE HYDROCHLORIDE 20 MG/ML
INJECTION, SOLUTION INFILTRATION; PERINEURAL
Status: DISCONTINUED | OUTPATIENT
Start: 2024-11-20 | End: 2024-11-20 | Stop reason: HOSPADM

## 2024-11-20 RX ORDER — VANCOMYCIN HYDROCHLORIDE 1 G/20ML
INJECTION, POWDER, LYOPHILIZED, FOR SOLUTION INTRAVENOUS
Status: DISCONTINUED | OUTPATIENT
Start: 2024-11-20 | End: 2024-11-20 | Stop reason: HOSPADM

## 2024-11-20 RX ADMIN — Medication 10 MG: at 08:11

## 2024-11-20 RX ADMIN — PROPOFOL 50 MCG/KG/MIN: 10 INJECTION, EMULSION INTRAVENOUS at 08:11

## 2024-11-20 RX ADMIN — PROPOFOL 50 MG: 10 INJECTION, EMULSION INTRAVENOUS at 08:11

## 2024-11-20 RX ADMIN — FENTANYL CITRATE 25 MCG: 50 INJECTION, SOLUTION INTRAMUSCULAR; INTRAVENOUS at 09:11

## 2024-11-20 RX ADMIN — CEFAZOLIN 2 G: 330 INJECTION, POWDER, FOR SOLUTION INTRAMUSCULAR; INTRAVENOUS at 08:11

## 2024-11-20 RX ADMIN — Medication 20 MG: at 08:11

## 2024-11-20 RX ADMIN — FENTANYL CITRATE 25 MCG: 50 INJECTION, SOLUTION INTRAMUSCULAR; INTRAVENOUS at 08:11

## 2024-11-20 RX ADMIN — LIDOCAINE HYDROCHLORIDE 40 MG: 20 INJECTION INTRAVENOUS at 08:11

## 2024-11-20 RX ADMIN — SODIUM CHLORIDE: 0.9 INJECTION, SOLUTION INTRAVENOUS at 08:11

## 2024-11-20 RX ADMIN — MIDAZOLAM HYDROCHLORIDE 2 MG: 2 INJECTION, SOLUTION INTRAMUSCULAR; INTRAVENOUS at 08:11

## 2024-11-20 RX ADMIN — ONDANSETRON 4 MG: 2 INJECTION INTRAMUSCULAR; INTRAVENOUS at 08:11

## 2024-11-20 RX ADMIN — DEXMEDETOMIDINE 8 MCG: 200 INJECTION, SOLUTION INTRAVENOUS at 08:11

## 2024-11-20 NOTE — NURSING
Pt DC'd per orders.  DC paperwork reviewed w pt and daughter.  Pt verbalized DC instructions.    IV removed. Pt was able to drink, eat, walk w Jessie ROBERTSON, and urinate with no difficulties.    Pt being wheeled off unit per transport in wheelchair.

## 2024-11-20 NOTE — TRANSFER OF CARE
"Anesthesia Transfer of Care Note    Patient: Hannah Montoya    Procedure(s) Performed: Procedure(s) (LRB):  REPLACEMENT, ICD GENERATOR (Left)    Patient location: PACU    Anesthesia Type: general    Transport from OR: Transported from OR on 6-10 L/min O2 by face mask with adequate spontaneous ventilation    Post pain: adequate analgesia    Post assessment: no apparent anesthetic complications and tolerated procedure well    Post vital signs: stable    Level of consciousness: awake and alert    Nausea/Vomiting: no nausea/vomiting    Complications: none    Transfer of care protocol was followed      Last vitals: Visit Vitals  BP (!) 121/57 (BP Location: Right arm, Patient Position: Lying)   Pulse 74   Temp 36 °C (96.8 °F) (Temporal)   Resp 16   Ht 5' 2" (1.575 m)   Wt 74.8 kg (165 lb)   LMP  (LMP Unknown)   SpO2 100%   Breastfeeding No   BMI 30.18 kg/m²     "

## 2024-11-20 NOTE — H&P
[facility] - Short Stay Cardiac Unit  Cardiac Electrophysiology  History and Physical     Admission Date: 11/20/2024  Code Status: Prior   Attending Provider: Javy Gates MD   Principal Problem:NICM (nonischemic cardiomyopathy)    Subjective:     Chief Complaint:  NICM     HPI:  63 yoF NICM, LBBB s/p CRT-D. She had an attempt at an LV lead 8/10/17 by Dr Lopez. The attempt was complicated by tortuous venous anatomy and inability to pass an LV lead. Venograms show mid and anterolateral branches with tortuous courses. The mid lateral branch was accessed with a 014 wire but the lead could not be advanced. She had an epicardial lead implanted however she has elevated thresholds, 5.0 V @ 1.2 ms. She has expected 1-1.5 y on her battery life giving her overall ~2 years with her current device. She has mild improvement in symptoms. QRS went from 168 to 150 ms with CRT pacing. She is here for a second opinion.      Interval history: Attempt at His bundle lead 5/2/18 resulting in dislodgement. Re-attempt 6/15/18 with successful lead placement. Marked improvement in symptoms. ECG with QRS 84 ms. Normal CRT-D function (His lead in LV port).     Past Medical History:   Diagnosis Date    AICD (automatic cardioverter/defibrillator) present     Asthma     bronchitis in past    Breast cancer 2016    right    Cardiac pacemaker     Cardiomyopathy     COPD (chronic obstructive pulmonary disease)     Diabetes mellitus, type 2     Hyperglycemia     Hyperlipidemia     Hypertension     Malignant neoplasm of overlapping sites of female breast 2/12/2016    Nuclear sclerosis of both eyes 8/12/2020    Respiratory distress 3/12/2020       Past Surgical History:   Procedure Laterality Date    BIOPSY, WITH CT GUIDANCE Right 1/24/2023    Procedure: LUNG MASS BIOPSY, WITH CT GUIDANCE;  Surgeon: Yehuda Green MD;  [place] [facility] [place] [facility] C[place] LAB;  Service: Radiology;  Laterality: Right;    BREAST BIOPSY Right 2/2016    IDC    BREAST LUMPECTOMY  Right     CARDIAC CATHETERIZATION Bilateral 2017    CARDIAC DEFIBRILLATOR PLACEMENT Left 08/10/2017    CARDIAC DEFIBRILLATOR PLACEMENT Left 2018     SECTION      x2    CHOLECYSTECTOMY      COLONOSCOPY N/A 10/3/2024    Procedure: COLONOSCOPY;  Surgeon: Wicho Serna MD;  Location: [facility] ENDO;  Service: Gastroenterology;  Laterality: N/A;    ESOPHAGOGASTRODUODENOSCOPY N/A 10/3/2024    Procedure: EGD (ESOPHAGOGASTRODUODENOSCOPY);  Surgeon: Wicho Serna MD;  Location: [facility] ENDO;  Service: Gastroenterology;  Laterality: N/A;    FEMUR BIOPSY Left 2024    Procedure: BIOPSY, FEMUR;  Surgeon: Porter Campos MD;  [facility];  Service: Orthopedics;  Laterality: Left;    INSERTION OF TUNNELED CENTRAL VENOUS CATHETER (CVC) WITH SUBCUTANEOUS PORT Right 2020    Procedure: [id number], RIGHT;  Surgeon: Josefa Caceres MD;  [facility];  Service: General;  Laterality: Right;  Port-a-cath placed to R. IJ    INTRAMEDULLARY RODDING OF FEMUR Left 2024    Procedure: INSERTION, INTRAMEDULLARY ANTOINE, FEMUR;  Surgeon: Porter Campos MD;  [facility];  Service: Orthopedics;  Laterality: Left;    LUNG BIOPSY N/A 2020    Procedure: BIOPSY, LUNG;  Surgeon: [facility] Diagnostic Provider;  Location: [facility] OR;  Service: Radiology;  Laterality: N/A;  8AM START  RN PREOP 2020---COVID NEGATIVE    NAVIGATIONAL BRONCHOSCOPY N/A 10/13/2020    Procedure: BRONCHOSCOPY, NAVIGATIONAL;  Surgeon: Jamilah Weaver MD;  Location: [facility] OR [facility] FLR;  Service: Pulmonary;  Laterality: N/A;    REVISION OF IMPLANTABLE CARDIOVERTER-DEFIBRILLATOR (ICD) ELECTRODE LEAD PLACEMENT N/A 6/15/2018    Procedure: REVISION-LEAD-ICD;  Surgeon: Javy Gates MD;  Location: [facility] CATH LAB;  Service: Cardiology;  Laterality: N/A;  LBBB, Bi-V ICD HIS Ld rev, MDT, Choice, MB, 3 Prep    ROBOT-ASSISTED LAPAROSCOPIC LYMPHADENECTOMY USING DA SALVADOR XI Right 10/23/2020     Procedure: XI ROBOTIC LYMPHADENECTOMY;  Surgeon: Ramo Tucker MD;  Location: [facility] OR [address] [place] [address];  Service: Thoracic;  Laterality: Right;    TUBAL LIGATION         Review of patient's allergies indicates:   Allergen Reactions    Taxol [paclitaxel]      Hypersensitivity reaction to taxol, symptoms included shortness of breath, nausea, dizziness, flushing     Carboplatin Other (See Comments)     Itching and hives    Adhesive Rash     tegaderm burns and blistered skin       Current Facility-Administered Medications on File Prior to Encounter   Medication    fentaNYL injection 25 mcg    haloperidol lactate injection 0.5 mg    HYDROmorphone injection 0.2 mg    ondansetron injection 4 mg    sodium chloride 0.9% flush 10 mL     Current Outpatient Medications on File Prior to Encounter   Medication Sig    albuterol (VENTOLIN HFA) 90 mcg/actuation inhaler Inhale 2 puffs into the lungs every 4 (four) hours as needed for Wheezing or Shortness of Breath. Rescue    atorvastatin (LIPITOR) 10 MG tablet TAKE 1 TABLET BY MOUTH EVERY DAY    benzonatate (TESSALON) 200 MG capsule Take 1 capsule (200 mg total) by mouth 3 (three) times daily as needed for Cough.    buPROPion (WELLBUTRIN XL) 150 MG TB24 tablet TAKE 1 TABLET BY MOUTH EVERY DAY    cetirizine (ZYRTEC) 10 MG tablet Take 10 mg by mouth every evening.     losartan (COZAAR) 100 MG tablet TAKE 1 TABLET BY MOUTH EVERY DAY    metoprolol succinate (TOPROL-XL) 100 MG 24 hr tablet TAKE 1 TABLET BY MOUTH EVERY DAY    multivitamin (THERAGRAN) per tablet Take 1 tablet by mouth once daily.    ondansetron (ZOFRAN-ODT) 8 MG TbDL Take 1 tablet (8 mg total) by mouth every 8 (eight) hours as needed (nausea/vomiting). Take 1 tablet (8 mg) by mouth every 8 hours as needed for nausea/vomiting.    pantoprazole (PROTONIX) 40 MG tablet TAKE 1 TABLET BY MOUTH EVERY DAY    sertraline (ZOLOFT) 100 MG tablet TAKE 1 TABLET BY MOUTH EVERY DAY IN THE EVENING    tiotropium (SPIRIVA WITH HANDIHALER) 18 mcg  inhalation capsule INHALE THE CONTENTS OF 1 CAPSULE BY MOUTH EVERY DAY    WIXELA INHUB 250-50 mcg/dose diskus inhaler INHALE 1 PUFF INTO THE LUNGS 2 (TWO) TIMES DAILY. CONTROLLER    ACCU-CHEK ALFRED PLUS TEST STRP Strp USE TO TEST THREE TIMES DAILY    albuterol (ACCUNEB) 1.25 mg/3 mL Nebu use 1 AMPULE (3ml) via NEBULIZER EVERY 6 HOURS AS NEEDED    gabapentin (NEURONTIN) 300 MG capsule Take 1 capsule (300 mg total) by mouth 3 (three) times daily.    palonosetron (ALOXI) 0.25 mg/5 mL injection     READI-CAT 2 2 % (w/v) suspension     READI-CAT 2 2.1 % (w/v), 2.0 % (w/w) suspension      Family History       Problem Relation (Age of Onset)    Anxiety disorder Daughter, Son    Cataracts Mother, Father    Clotting disorder Brother    Kidney disease Mother, Father    Macular degeneration Maternal Grandmother    No Known Problems Sister, Maternal Aunt, Paternal Aunt, Maternal Uncle, Paternal Uncle, Maternal Grandfather, Paternal Grandfather, Paternal Grandmother          Tobacco Use    Smoking status: Former     Current packs/day: 0.00     Average packs/day: 0.5 packs/day for 40.0 years (20.0 ttl pk-yrs)     Types: Cigarettes     Start date: 1976     Quit date: 2016     Years since quittin.3     Passive exposure: Past    Smokeless tobacco: Never   Substance and Sexual Activity    Alcohol use: Yes     Alcohol/week: 1.0 standard drink of alcohol     Types: 1 Glasses of wine per week     Comment: rare- holiday    Drug use: No    Sexual activity: Not Currently     Partners: Male     Comment:      Review of Systems   All other systems reviewed and are negative.    Objective:     Vital Signs (Most Recent):  Temp: 97.9 °F (36.6 °C) (24)  Pulse: 79 (24)  Resp: 20 (24)  BP: 138/67 (24)  SpO2: 96 % (24) Vital Signs (24h Range):  Temp:  [97.9 °F (36.6 °C)] 97.9 °F (36.6 °C)  Pulse:  [79-94] 79  Resp:  [16-20] 20  SpO2:  [96 %] 96 %  BP: (129-138)/(63-67) 138/67        Weight: 74.8 kg (165 lb)  Body mass index is 30.18 kg/m².    SpO2: 96 %        Physical Exam  Vitals and nursing note reviewed.   Constitutional:       Appearance: Normal appearance.   Cardiovascular:      Rate and Rhythm: Normal rate and regular rhythm.      Pulses: Normal pulses.      Heart sounds: Normal heart sounds.   Pulmonary:      Effort: Pulmonary effort is normal.      Breath sounds: Normal breath sounds.   Musculoskeletal:      Right lower leg: No edema.      Left lower leg: No edema.   Skin:     General: Skin is warm.   Neurological:      Mental Status: She is alert.            Significant Labs: All pertinent lab results from the last 24 hours have been reviewed.  Assessment and Plan:     * NICM (nonischemic cardiomyopathy)  Hannah Montoya is a 69 y.o. female w/ a pertinent PMHx of NICM s/p CRT-D with recovery of function here for gen change. EF 55%.      The indication(s), risks, benefits and alternatives of the procedure were explained to the patient, patient's family and/or surrogate decision maker. Risks include (but not limited to) bleeding, pocket site hematoma, skin reactions, pocket and/or device infection which would often require removal, device migration potentially requiring revision, pain, chest wall injury. All questions were answered. Patient is understanding of these risks, and wishes to proceed. Consents signed.        Suze Mcgill MD  Cardiac Electrophysiology  [facility] - Short Stay Cardiac Unit

## 2024-11-20 NOTE — Clinical Note
A dressing was applied to the incision. 30 minutes after dermabond applied, followed by pressure dressing

## 2024-11-20 NOTE — Clinical Note
There was an existing generator. The generator was removed. The leads were disconnected. The generator was returned to . The generator was returned due to LATOYA/EOL

## 2024-11-20 NOTE — HOSPITAL COURSE
Patient underwent successful generator change with no complications. Sent home with 5 days antibiotics. She is currently on Eliquis for management of PE. Advised her to hold Eliquis for 48 hours and then can resume. Follow up in 1 week with device clinic.

## 2024-11-20 NOTE — HPI
63 yoF NICM, LBBB s/p CRT-D. She had an attempt at an LV lead 8/10/17 by Dr Lopez. The attempt was complicated by tortuous venous anatomy and inability to pass an LV lead. Venograms show mid and anterolateral branches with tortuous courses. The mid lateral branch was accessed with a 014 wire but the lead could not be advanced. She had an epicardial lead implanted however she has elevated thresholds, 5.0 V @ 1.2 ms. She has expected 1-1.5 y on her battery life giving her overall ~2 years with her current device. She has mild improvement in symptoms. QRS went from 168 to 150 ms with CRT pacing. She is here for a second opinion.      Interval history: Attempt at His bundle lead 5/2/18 resulting in dislodgement. Re-attempt 6/15/18 with successful lead placement. Marked improvement in symptoms. ECG with QRS 84 ms. Normal CRT-D function (His lead in LV port).

## 2024-11-20 NOTE — SUBJECTIVE & OBJECTIVE
Past Medical History:   Diagnosis Date    AICD (automatic cardioverter/defibrillator) present     Asthma     bronchitis in past    Breast cancer 2016    right    Cardiac pacemaker     Cardiomyopathy     COPD (chronic obstructive pulmonary disease)     Diabetes mellitus, type 2     Hyperglycemia     Hyperlipidemia     Hypertension     Malignant neoplasm of overlapping sites of female breast 2016    Nuclear sclerosis of both eyes 2020    Respiratory distress 3/12/2020       Past Surgical History:   Procedure Laterality Date    BIOPSY, WITH CT GUIDANCE Right 2023    Procedure: LUNG MASS BIOPSY, WITH CT GUIDANCE;  Surgeon: Yehuda Green MD;  Location: [facility] CATH LAB;  Service: Radiology;  Laterality: Right;    BREAST BIOPSY Right 2016    IDC    BREAST LUMPECTOMY Right     CARDIAC CATHETERIZATION Bilateral 2017    CARDIAC DEFIBRILLATOR PLACEMENT Left 08/10/2017    CARDIAC DEFIBRILLATOR PLACEMENT Left 2018     SECTION      x2    CHOLECYSTECTOMY      COLONOSCOPY N/A 10/3/2024    Procedure: COLONOSCOPY;  Surgeon: Wicho Serna MD;  Location: [facility] ENDO;  Service: Gastroenterology;  Laterality: N/A;    ESOPHAGOGASTRODUODENOSCOPY N/A 10/3/2024    Procedure: EGD (ESOPHAGOGASTRODUODENOSCOPY);  Surgeon: Wicho Serna MD;  Location: [facility];  Service: Gastroenterology;  Laterality: N/A;    FEMUR BIOPSY Left 2024    Procedure: BIOPSY, FEMUR;  Surgeon: Porter Campos MD;  Location: [facility] OR [address] [place] [address];  Service: Orthopedics;  Laterality: Left;    INSERTION OF TUNNELED CENTRAL VENOUS CATHETER (CVC) WITH SUBCUTANEOUS PORT Right 2020    Procedure: CTWYZZMIU-FLUI-O-CATH, RIGHT;  Surgeon: Josefa Caceres MD;  [facility];  Service: General;  Laterality: Right;  Port-a-cath placed to R. IJ    INTRAMEDULLARY RODDING OF FEMUR Left 2024    Procedure: INSERTION, INTRAMEDULLARY ANTOINE, FEMUR;  Surgeon: Porter Campos MD;  Location: [facility] OR [address] [place] [address];   Service: Orthopedics;  Laterality: Left;    LUNG BIOPSY N/A 5/28/2020    Procedure: BIOPSY, LUNG;  Surgeon: Kalpesh Diagnostic Provider;  Location: [facility] OR;  Service: Radiology;  Laterality: N/A;  8AM START  RN PREOP 5/26/2020---COVID NEGATIVE    NAVIGATIONAL BRONCHOSCOPY N/A 10/13/2020    Procedure: BRONCHOSCOPY, NAVIGATIONAL;  Surgeon: Jamilah Weaver MD;  [facility];  Service: Pulmonary;  Laterality: N/A;    REVISION OF IMPLANTABLE CARDIOVERTER-DEFIBRILLATOR (ICD) ELECTRODE LEAD PLACEMENT N/A 6/15/2018    Procedure: REVISION-LEAD-ICD;  Surgeon: Javy Gates MD;  Location: [facility] CATH LAB;  Service: Cardiology;  Laterality: N/A;  LBBB, Bi-V ICD HIS Elmo jordan, MDT, Choice, MB, 3 Prep    ROBOT-ASSISTED LAPAROSCOPIC LYMPHADENECTOMY USING DA SALVADOR XI Right 10/23/2020    Procedure: XI ROBOTIC LYMPHADENECTOMY;  Surgeon: Ramo Tucker MD;  [facility];  Service: Thoracic;  Laterality: Right;    TUBAL LIGATION         Review of patient's allergies indicates:   Allergen Reactions    Taxol [paclitaxel]      Hypersensitivity reaction to taxol, symptoms included shortness of breath, nausea, dizziness, flushing     Carboplatin Other (See Comments)     Itching and hives    Adhesive Rash     tegaderm burns and blistered skin       Current Facility-Administered Medications on File Prior to Encounter   Medication    fentaNYL injection 25 mcg    haloperidol lactate injection 0.5 mg    HYDROmorphone injection 0.2 mg    ondansetron injection 4 mg    sodium chloride 0.9% flush 10 mL     Current Outpatient Medications on File Prior to Encounter   Medication Sig    albuterol (VENTOLIN HFA) 90 mcg/actuation inhaler Inhale 2 puffs into the lungs every 4 (four) hours as needed for Wheezing or Shortness of Breath. Rescue    atorvastatin (LIPITOR) 10 MG tablet TAKE 1 TABLET BY MOUTH EVERY DAY    benzonatate (TESSALON) 200 MG capsule Take 1 capsule (200 mg total) by mouth 3 (three) times daily as needed for  Cough.    buPROPion (WELLBUTRIN XL) 150 MG TB24 tablet TAKE 1 TABLET BY MOUTH EVERY DAY    cetirizine (ZYRTEC) 10 MG tablet Take 10 mg by mouth every evening.     losartan (COZAAR) 100 MG tablet TAKE 1 TABLET BY MOUTH EVERY DAY    metoprolol succinate (TOPROL-XL) 100 MG 24 hr tablet TAKE 1 TABLET BY MOUTH EVERY DAY    multivitamin (THERAGRAN) per tablet Take 1 tablet by mouth once daily.    ondansetron (ZOFRAN-ODT) 8 MG TbDL Take 1 tablet (8 mg total) by mouth every 8 (eight) hours as needed (nausea/vomiting). Take 1 tablet (8 mg) by mouth every 8 hours as needed for nausea/vomiting.    pantoprazole (PROTONIX) 40 MG tablet TAKE 1 TABLET BY MOUTH EVERY DAY    sertraline (ZOLOFT) 100 MG tablet TAKE 1 TABLET BY MOUTH EVERY DAY IN THE EVENING    tiotropium (SPIRIVA WITH HANDIHALER) 18 mcg inhalation capsule INHALE THE CONTENTS OF 1 CAPSULE BY MOUTH EVERY DAY    WIXELA INHUB 250-50 mcg/dose diskus inhaler INHALE 1 PUFF INTO THE LUNGS 2 (TWO) TIMES DAILY. CONTROLLER    ACCU-CHEK ALFRED PLUS TEST STRP Strp USE TO TEST THREE TIMES DAILY    albuterol (ACCUNEB) 1.25 mg/3 mL Nebu use 1 AMPULE (3ml) via NEBULIZER EVERY 6 HOURS AS NEEDED    gabapentin (NEURONTIN) 300 MG capsule Take 1 capsule (300 mg total) by mouth 3 (three) times daily.    palonosetron (ALOXI) 0.25 mg/5 mL injection     READI-CAT 2 2 % (w/v) suspension     READI-CAT 2 2.1 % (w/v), 2.0 % (w/w) suspension      Family History       Problem Relation (Age of Onset)    Anxiety disorder Daughter, Son    Cataracts Mother, Father    Clotting disorder Brother    Kidney disease Mother, Father    Macular degeneration Maternal Grandmother    No Known Problems Sister, Maternal Aunt, Paternal Aunt, Maternal Uncle, Paternal Uncle, Maternal Grandfather, Paternal Grandfather, Paternal Grandmother          Tobacco Use    Smoking status: Former     Current packs/day: 0.00     Average packs/day: 0.5 packs/day for 40.0 years (20.0 ttl pk-yrs)     Types: Cigarettes     Start date:  1976     Quit date: 2016     Years since quittin.3     Passive exposure: Past    Smokeless tobacco: Never   Substance and Sexual Activity    Alcohol use: Yes     Alcohol/week: 1.0 standard drink of alcohol     Types: 1 Glasses of wine per week     Comment: rare- holiday    Drug use: No    Sexual activity: Not Currently     Partners: Male     Comment:      Review of Systems   All other systems reviewed and are negative.    Objective:     Vital Signs (Most Recent):  Temp: 97.9 °F (36.6 °C) (24)  Pulse: 79 (24)  Resp: 20 (24)  BP: 138/67 (24)  SpO2: 96 % (24) Vital Signs (24h Range):  Temp:  [97.9 °F (36.6 °C)] 97.9 °F (36.6 °C)  Pulse:  [79-94] 79  Resp:  [16-20] 20  SpO2:  [96 %] 96 %  BP: (129-138)/(63-67) 138/67       Weight: 74.8 kg (165 lb)  Body mass index is 30.18 kg/m².    SpO2: 96 %        Physical Exam  Vitals and nursing note reviewed.   Constitutional:       Appearance: Normal appearance.   Cardiovascular:      Rate and Rhythm: Normal rate and regular rhythm.      Pulses: Normal pulses.      Heart sounds: Normal heart sounds.   Pulmonary:      Effort: Pulmonary effort is normal.      Breath sounds: Normal breath sounds.   Musculoskeletal:      Right lower leg: No edema.      Left lower leg: No edema.   Skin:     General: Skin is warm.   Neurological:      Mental Status: She is alert.            Significant Labs: All pertinent lab results from the last 24 hours have been reviewed.

## 2024-11-20 NOTE — NURSING
Received report from Jessie ROBERTSON. Patient s/p Gen change, AAOx4.   VSS, no c/o pain or discomfort at this time, resp even and unlabored.   Foam dressing and pressure dressing to L chest wall is CDI. No active bleeding. No hematoma noted.   Post procedure protocol reviewed with patient and patient's family. Understanding verbalized. Family members at bedside. Nurse call bell within reach.

## 2024-11-20 NOTE — DISCHARGE SUMMARY
[facility] - Cardiology  Cardiac Electrophysiology  Discharge Summary      Patient Name: Hannah Montoya  MRN: [id number]  Admission Date: 11/20/2024  Hospital Length of Stay: 0 days  Discharge Date and Time:  11/20/2024 11:33 AM  Attending Physician: Javy Gates MD    Discharging Provider: Suze Mcgill MD  Primary Care Physician: Emanuel Chavez MD    HPI:   63 yoF NICM, LBBB s/p CRT-D. She had an attempt at an LV lead 8/10/17 by Dr Lopez. The attempt was complicated by tortuous venous anatomy and inability to pass an LV lead. Venograms show mid and anterolateral branches with tortuous courses. The mid lateral branch was accessed with a 014 wire but the lead could not be advanced. She had an epicardial lead implanted however she has elevated thresholds, 5.0 V @ 1.2 ms. She has expected 1-1.5 y on her battery life giving her overall ~2 years with her current device. She has mild improvement in symptoms. QRS went from 168 to 150 ms with CRT pacing. She is here for a second opinion.      Interval history: Attempt at His bundle lead 5/2/18 resulting in dislodgement. Re-attempt 6/15/18 with successful lead placement. Marked improvement in symptoms. ECG with QRS 84 ms. Normal CRT-D function (His lead in LV port).     Procedure(s) (LRB):  REPLACEMENT, ICD GENERATOR (Left)     Indwelling Lines/Drains at time of discharge:  Lines/Drains/Airways       None                   Hospital Course:  Patient underwent successful generator change with no complications. Sent home with 5 days antibiotics. She is currently on Eliquis for management of PE. Advised her to hold Eliquis for 48 hours and then can resume. Follow up in 1 week with device clinic.    Goals of Care Treatment Preferences:  Code Status: Full Code    Health care agent: [facility] agent number: [phone]    Living Will: Yes              Consults:     Significant Diagnostic Studies: N/A    Pending Diagnostic Studies:        "None            Final Active Diagnoses:    Diagnosis Date Noted POA    PRINCIPAL PROBLEM:  NICM (nonischemic cardiomyopathy) [I42.8] 05/02/2018 Yes      Problems Resolved During this Admission:     No new Assessment & Plan notes have been filed under this hospital service since the last note was generated.  Service: Arrhythmia      Discharged Condition: stable    Disposition:     Follow Up:   Follow-up Information       DEVICE CHECK CLINIC Follow up in 1 week(s).               Javy Gates MD Follow up.    Specialties: Electrophysiology, Cardiovascular Disease, Cardiology  Why: As needed  Contact information:  [facility]  [facility] [id number]  [phone]                           Patient Instructions:   No discharge procedures on file.  Medications:  Reconciled Home Medications:      Medication List        START taking these medications      doxycycline 100 MG Cap  Commonly known as: VIBRAMYCIN  Take 1 capsule (100 mg total) by mouth every 12 (twelve) hours. for 5 days            CONTINUE taking these medications      ACCU-CHEK ALFRED PLUS TEST STRP Strp  Generic drug: blood sugar diagnostic  USE TO TEST THREE TIMES DAILY     acetaminophen 500 MG tablet  Commonly known as: TYLENOL  Take 2 tablets (1,000 mg total) by mouth every 8 (eight) hours as needed for Pain.     * albuterol 1.25 mg/3 mL Nebu  Commonly known as: ACCUNEB  use 1 AMPULE (3ml) via NEBULIZER EVERY 6 HOURS AS NEEDED     * albuterol 90 mcg/actuation inhaler  Commonly known as: VENTOLIN HFA  Inhale 2 puffs into the lungs every 4 (four) hours as needed for Wheezing or Shortness of Breath. Rescue     amLODIPine 5 MG tablet  Commonly known as: NORVASC  TAKE 1 TABLET BY MOUTH EVERY DAY     atorvastatin 10 MG tablet  Commonly known as: LIPITOR  TAKE 1 TABLET BY MOUTH EVERY DAY     bacitracin 500 unit/gram Oint  Apply topically 2 (two) times daily.     BD ULTRA-FINE GIN PEN NEEDLE 32 gauge x 5/32" Ndle  Generic drug: pen needle, " diabetic  For once daily insulin injections     benzonatate 200 MG capsule  Commonly known as: TESSALON  Take 1 capsule (200 mg total) by mouth 3 (three) times daily as needed for Cough.     buPROPion 150 MG TB24 tablet  Commonly known as: WELLBUTRIN XL  TAKE 1 TABLET BY MOUTH EVERY DAY     cetirizine 10 MG tablet  Commonly known as: ZYRTEC  Take 10 mg by mouth every evening.     cholecalciferol (vitamin D3) 50 mcg (2,000 unit) Tab  Commonly known as: VITAMIN D3  Take 1 tablet (2,000 Units total) by mouth once daily.     ELIQUIS DVT-PE TREAT 30D START 5 mg (74 tabs) Dspk  Generic drug: apixaban  For the first 7 days take two 5 mg tablets twice daily.  After 7 days take one 5 mg tablet twice daily.     LANTUS SOLOSTAR U-100 INSULIN 100 unit/mL (3 mL) Inpn pen  Generic drug: insulin glargine U-100 (Lantus)  Inject 12 Units into the skin every evening.     losartan 100 MG tablet  Commonly known as: COZAAR  TAKE 1 TABLET BY MOUTH EVERY DAY     metFORMIN 500 MG tablet  Commonly known as: GLUCOPHAGE  Take 1 tablet (500 mg total) by mouth 2 (two) times daily with meals. Needs appointment for future refills     metoprolol succinate 100 MG 24 hr tablet  Commonly known as: TOPROL-XL  TAKE 1 TABLET BY MOUTH EVERY DAY     multivitamin per tablet  Commonly known as: THERAGRAN  Take 1 tablet by mouth once daily.     nystatin powder  Commonly known as: MYCOSTATIN  Apply topically 2 (two) times daily.     ondansetron 8 MG Tbdl  Commonly known as: ZOFRAN-ODT  Take 1 tablet (8 mg total) by mouth every 8 (eight) hours as needed (nausea/vomiting). Take 1 tablet (8 mg) by mouth every 8 hours as needed for nausea/vomiting.     palonosetron 0.25 mg/5 mL injection  Commonly known as: ALOXI     pantoprazole 40 MG tablet  Commonly known as: PROTONIX  TAKE 1 TABLET BY MOUTH EVERY DAY     * READI-CAT 2 2 % (w/v) suspension  Generic drug: barium sulfate     * READI-CAT 2 2.1 % (w/v), 2.0 % (w/w) suspension  Generic drug: barium     sertraline  100 MG tablet  Commonly known as: ZOLOFT  TAKE 1 TABLET BY MOUTH EVERY DAY IN THE EVENING     tiotropium 18 mcg inhalation capsule  Commonly known as: SPIRIVA WITH HANDIHALER  INHALE THE CONTENTS OF 1 CAPSULE BY MOUTH EVERY DAY     WIXELA INHUB 250-50 mcg/dose diskus inhaler  Generic drug: fluticasone-salmeterol 250-50 mcg/dose  INHALE 1 PUFF INTO THE LUNGS 2 (TWO) TIMES DAILY. CONTROLLER           * This list has 4 medication(s) that are the same as other medications prescribed for you. Read the directions carefully, and ask your doctor or other care provider to review them with you.                ASK your doctor about these medications      gabapentin 300 MG capsule  Commonly known as: NEURONTIN  Take 1 capsule (300 mg total) by mouth 3 (three) times daily.     predniSONE 20 MG tablet  Commonly known as: DELTASONE  Take 4 tablets (80 mg total) by mouth once daily x3 days, THEN 3.5 tablets (70 mg total) once daily x7 days, THEN 3 tablets (60 mg total) once daily x7 days, THEN 2.5 tablets (50 mg total) once daily x7 days, THEN 2 tablets (40 mg total) once daily x7 days, THEN 1.5 tablets (30 mg total) once daily x7 days, THEN 1 tablet (20 mg total) once daily x7 days, THEN 0.5 tablets (10 mg total) once daily x7 days.  Start taking on: October 8, 2024              Time spent on the discharge of patient: 45 minutes    Suze Mcgill MD  Cardiac Electrophysiology  [facility] - Cardiology

## 2024-11-20 NOTE — PROGRESS NOTES
Pt ambulated with minimal assist to restroom. Able to void spontaneously. Transferred to SSCU 9 via stretcher and report given at beside to AILYN boyle

## 2024-11-20 NOTE — ANESTHESIA POSTPROCEDURE EVALUATION
Anesthesia Post Evaluation    Patient: Hannah Montoya    Procedure(s) Performed: Procedure(s) (LRB):  REPLACEMENT, ICD GENERATOR (Left)    Final Anesthesia Type: general      Patient location during evaluation: PACU  Patient participation: Yes- Able to Participate  Level of consciousness: awake and alert and oriented  Post-procedure vital signs: reviewed and stable  Pain management: adequate  Airway patency: patent    PONV status at discharge: No PONV  Anesthetic complications: no      Cardiovascular status: blood pressure returned to baseline and hemodynamically stable  Respiratory status: unassisted  Hydration status: euvolemic  Follow-up not needed.              Vitals Value Taken Time   /62 11/20/24 1135   Temp 36.1 °C (97 °F) 11/20/24 1135   Pulse 79 11/20/24 1135   Resp 18 11/20/24 1135   SpO2 96 % 11/20/24 1135         No case tracking events are documented in the log.      Pain/James Score: James Score: 10 (11/20/2024 11:35 AM)

## 2024-11-20 NOTE — DISCHARGE INSTRUCTIONS
Medication instructions:    Complete your 5 days of antibiotics  If you are on a blood thinner (examples: apixiban [Eliquis], rivaroxaban [Xarelto], dabigatran [Pradaxa], or enoxaparin [Lovenox]), do not take your blood thinner for the next 48 hours after your procedure. You can resume after 5 days post-procedure (i.e., when you complete your antibiotics). If you are on Coumadin you can continue to take it the day of your procedure    Pain control: you can take up to Tylenol 1000 mg every 6 hours as needed or (if you do not have kidney disease) ibuprofen 600 mg every 6 hours as needed as needed for pain control (if you do not have kidney disease). If unsure, please contact your primary care physician for recommendations.      Activity restrictions & precautions:    Surgical site   Dressing (see images below)  **Note: these are general rules to follow unless instructed otherwise** - see images below  If you have a brown rectangular gel bandage (A.K.A. Aquacel Ag dressing) over your surgical incision do not remove it. This will be removed at your device clinic follow up appointment in 1 week.  If you have a square light brown bandage (A.K.A Mepilex dressing) you should remove in 48 hours after your procedure.  If you have a pressure dressing (white elastic tape with gauze underneath or a very large bandage that covers your left chest and is shaped like a triangle) over your surgical site, this should be removed the morning after your procedure unless instructed otherwise.  Do not place any creams or ointments over the surgical site. This could effect the integrity of the Dermabond glue.  You can shower after 24 hours post-procedure, but do not let the jet directly hit your pocket site for at least 2 weeks. Recommend showering with your back to the shower head.   Do not submerge your surgical incision site under water (swimming, bathing if you submerge the actual surgical site) for at least 6 week. It is imperative  that the surgical site is completely healed prior to doing so    Follow up in device clinic in 1 week to check your incision and device function  Please contact the electrophysiology clinic if you have any questions or if you experience: potential surgical/pocket site complications (pain, swelling, bleeding, drainage), fevers, chest pain/shortness of breath, or for any other concerns.

## 2024-11-20 NOTE — ASSESSMENT & PLAN NOTE
Hannah Montoya is a 69 y.o. female w/ a pertinent PMHx of NICM s/p CRT-D with recovery of function here for gen change. EF 55%.      The indication(s), risks, benefits and alternatives of the procedure were explained to the patient, patient's family and/or surrogate decision maker. Risks include (but not limited to) bleeding, pocket site hematoma, skin reactions, pocket and/or device infection which would often require removal, device migration potentially requiring revision, pain, chest wall injury. All questions were answered. Patient is understanding of these risks, and wishes to proceed. Consents signed.

## 2024-11-24 DIAGNOSIS — C34.91 NSCLC OF RIGHT LUNG: ICD-10-CM

## 2024-11-25 RX ORDER — ONDANSETRON 8 MG/1
8 TABLET, ORALLY DISINTEGRATING ORAL EVERY 8 HOURS PRN
Qty: 60 TABLET | Refills: 5 | Status: SHIPPED | OUTPATIENT
Start: 2024-11-25

## 2024-11-26 ENCOUNTER — HOSPITAL ENCOUNTER (OUTPATIENT)
Dept: WOUND CARE | Facility: HOSPITAL | Age: 69
Discharge: HOME | End: 2024-11-26
Attending: FAMILY MEDICINE
Payer: MEDICARE

## 2024-11-26 ENCOUNTER — PATIENT MESSAGE (OUTPATIENT)
Dept: FAMILY MEDICINE | Facility: CLINIC | Age: 69
End: 2024-11-26

## 2024-11-26 VITALS
SYSTOLIC BLOOD PRESSURE: 143 MMHG | RESPIRATION RATE: 16 BRPM | TEMPERATURE: 97 F | HEART RATE: 80 BPM | DIASTOLIC BLOOD PRESSURE: 71 MMHG

## 2024-11-26 DIAGNOSIS — L03.116 CELLULITIS OF LEFT LOWER EXTREMITY: Primary | ICD-10-CM

## 2024-11-26 DIAGNOSIS — I42.0 DILATED CARDIOMYOPATHY: ICD-10-CM

## 2024-11-26 PROCEDURE — 99213 OFFICE O/P EST LOW 20 MIN: CPT | Performed by: FAMILY MEDICINE

## 2024-11-26 PROCEDURE — 99214 OFFICE O/P EST MOD 30 MIN: CPT | Mod: ,,, | Performed by: FAMILY MEDICINE

## 2024-11-26 NOTE — PROGRESS NOTES
[facility]                Progress Note    Subjective:       Patient ID: Hannah Montoya is a 69 y.o. female.    Chief Complaint: Wound Check    Follow Up wound care visit. Patient entered via wheelchair pushed by son  to [facility] with family friend and nurse at side. Patient denies fever, chills, nausea, vomiting or diarrhea at present. Patient denies pain to wound bed at present. Patient reports not having any issues with dressing  since last visit. Dressing appears  without strike through drainage. Dressing removed & wound cleaned by nurse. Dressing  was discarded. Wound bed pink  and moist, no drainage. Wound is healed. Patient discharged from wound care clinic.     MD note:  patient presenting for follow up of ulcer to LLE.  There has been no pain or excessive drainage.  Patient has changed the dressing since her last visit.  She states she feels it is healed.  There has been no fevers, chills, or sweats.  No malodorous drainage was noticed by patient or her family.                          Wound Check      Review of Systems   Constitutional: Negative.    HENT: Negative.     Eyes: Negative.    Respiratory: Negative.     Cardiovascular: Negative.    Gastrointestinal: Negative.    Skin:  Positive for wound.         Objective:        Physical Exam  Constitutional:       Appearance: Normal appearance.   HENT:      Head: Normocephalic and atraumatic.      Right Ear: External ear normal.      Left Ear: External ear normal.      Mouth/Throat:      Mouth: Mucous membranes are moist.      Pharynx: Oropharynx is clear.   Eyes:      Extraocular Movements: Extraocular movements intact.      Conjunctiva/sclera: Conjunctivae normal.   Pulmonary:      Effort: Pulmonary effort is normal. No respiratory distress.   Abdominal:      General: There is no distension.      Palpations: Abdomen is soft.   Skin:     General: Skin is warm.      Comments: +ulcer to anterior LLE has healed  without maceration or periwound erythema   Neurological:      Mental Status: She is alert.         Vitals:    11/26/24 1109   BP: (!) 143/71   Pulse: 80   Resp: 16   Temp: 96.8 °F (36 °C)       Assessment:           ICD-10-CM ICD-9-CM   1. Cellulitis of left lower extremity  L03.116 682.6   2. Dilated cardiomyopathy  I42.0 425.4            Wound 10/30/24 0930 Other (comment) Left anterior Leg (Active)   10/30/24 0930 Leg   Present on Original Admission: Y   Primary Wound Type: Other   Side: Left   Orientation: anterior   Wound Approximate Age at First Assessment (Weeks):    Wound Number:    Is this injury device related?:    Incision Type:    Closure Method:    Wound Description (Comments):    Type:    Additional Comments:    Ankle-Brachial Index:    Pulses:    Removal Indication and Assessment:    Wound Outcome:    Wound Image   11/26/24 1132   Dressing Appearance Intact;Dried drainage 11/26/24 1132   Drainage Amount Scant 11/26/24 1132   Drainage Characteristics/Odor Serosanguineous 11/26/24 1132   Appearance Pink;Moist 11/26/24 1132   Red (%), Wound Tissue Color 100 % 11/26/24 1132   Periwound Area Intact;Dry 11/26/24 1132   Wound Length (cm) 0.2 cm 11/26/24 1132   Wound Width (cm) 0.2 cm 11/26/24 1132   Wound Depth (cm) 0.01 cm 11/26/24 1132   Wound Volume (cm^3) 0.0004 cm^3 11/26/24 1132   Wound Surface Area (cm^2) 0.04 cm^2 11/26/24 1132   Care Cleansed with:;Antimicrobial agent;Sterile normal saline 11/26/24 1132   Dressing Applied;Island/border 11/26/24 1132           Plan:              Tissue pathology and/or culture taken     [] Yes      [x] No  Sharp debridement performed                   [] Yes       [x] No  Labs ordered     [] Yes       [x] No  Imaging ordered    [] Yes      [x] No    Orders Placed This Encounter   Procedures    SUBSEQUENT HOME HEALTH ORDERS     WOUND CARE ORDERS    Patient discharged from Home Health for wound care.     Order Specific Question:   What Home Health Agency is the patient  currently using?     Answer:   [facility]    Cardiac device check - In Clinic & Hospital     Standing Status:   Standing     Number of Occurrences:   1     Patient discharged as wound has healed.   She is to call should wound reopen  Follow up if symptoms worsen or fail to improve.     Emanuel Chavez MD

## 2024-11-27 ENCOUNTER — CLINICAL SUPPORT (OUTPATIENT)
Dept: CARDIOLOGY | Facility: HOSPITAL | Age: 69
End: 2024-11-27
Attending: INTERNAL MEDICINE
Payer: MEDICARE

## 2024-11-27 DIAGNOSIS — I42.0 DILATED CARDIOMYOPATHY: ICD-10-CM

## 2024-11-27 LAB
OHS CV AF BURDEN PERCENT: < 1
OHS CV DC REMOTE DEVICE TYPE: NORMAL
OHS CV ICD SHOCK: NO
OHS CV RV PACING PERCENT: 95.78 %

## 2024-11-27 PROCEDURE — 93284 PRGRMG EVAL IMPLANTABLE DFB: CPT

## 2024-12-03 ENCOUNTER — PATIENT MESSAGE (OUTPATIENT)
Dept: HEMATOLOGY/ONCOLOGY | Facility: CLINIC | Age: 69
End: 2024-12-03
Payer: MEDICARE

## 2024-12-06 ENCOUNTER — DOCUMENT SCAN (OUTPATIENT)
Dept: HOME HEALTH SERVICES | Facility: HOSPITAL | Age: 69
End: 2024-12-06
Payer: MEDICARE

## 2024-12-06 DIAGNOSIS — I42.0 DILATED CARDIOMYOPATHY: ICD-10-CM

## 2024-12-06 DIAGNOSIS — Z95.810 CARDIAC RESYNCHRONIZATION THERAPY DEFIBRILLATOR (CRT-D) IN PLACE: Primary | ICD-10-CM

## 2024-12-09 ENCOUNTER — DOCUMENT SCAN (OUTPATIENT)
Dept: HOME HEALTH SERVICES | Facility: HOSPITAL | Age: 69
End: 2024-12-09
Payer: MEDICARE

## 2024-12-10 ENCOUNTER — TELEPHONE (OUTPATIENT)
Dept: ELECTROPHYSIOLOGY | Facility: CLINIC | Age: 69
End: 2024-12-10
Payer: MEDICARE

## 2024-12-13 ENCOUNTER — PATIENT MESSAGE (OUTPATIENT)
Dept: ADMINISTRATIVE | Facility: CLINIC | Age: 69
End: 2024-12-13
Payer: MEDICARE

## 2024-12-17 ENCOUNTER — DOCUMENT SCAN (OUTPATIENT)
Dept: HOME HEALTH SERVICES | Facility: HOSPITAL | Age: 69
End: 2024-12-17
Payer: MEDICARE

## 2024-12-17 ENCOUNTER — TELEPHONE (OUTPATIENT)
Dept: ADMINISTRATIVE | Facility: CLINIC | Age: 69
End: 2024-12-17
Payer: MEDICARE

## 2024-12-19 ENCOUNTER — OFFICE VISIT (OUTPATIENT)
Dept: FAMILY MEDICINE | Facility: CLINIC | Age: 69
End: 2024-12-19
Payer: MEDICARE

## 2024-12-19 VITALS — HEIGHT: 62 IN | BODY MASS INDEX: 29.81 KG/M2 | WEIGHT: 162 LBS

## 2024-12-19 DIAGNOSIS — I42.8 NICM (NONISCHEMIC CARDIOMYOPATHY): ICD-10-CM

## 2024-12-19 DIAGNOSIS — H25.13 NUCLEAR SCLEROSIS OF BOTH EYES: ICD-10-CM

## 2024-12-19 DIAGNOSIS — H91.90 DECREASED HEARING, UNSPECIFIED LATERALITY: ICD-10-CM

## 2024-12-19 DIAGNOSIS — Z00.00 ENCOUNTER FOR PREVENTIVE HEALTH EXAMINATION: Primary | ICD-10-CM

## 2024-12-19 DIAGNOSIS — I15.2 HYPERTENSION ASSOCIATED WITH DIABETES: ICD-10-CM

## 2024-12-19 DIAGNOSIS — I70.8 AORTO-ILIAC ATHEROSCLEROSIS: ICD-10-CM

## 2024-12-19 DIAGNOSIS — Z99.89 DEPENDENCE ON OTHER ENABLING MACHINES AND DEVICES: ICD-10-CM

## 2024-12-19 DIAGNOSIS — L97.822 NON-PRESSURE CHRONIC ULCER OF OTHER PART OF LEFT LOWER LEG WITH FAT LAYER EXPOSED: ICD-10-CM

## 2024-12-19 DIAGNOSIS — I70.0 AORTO-ILIAC ATHEROSCLEROSIS: ICD-10-CM

## 2024-12-19 DIAGNOSIS — J44.9 CHRONIC OBSTRUCTIVE PULMONARY DISEASE, UNSPECIFIED COPD TYPE: ICD-10-CM

## 2024-12-19 DIAGNOSIS — H52.7 REFRACTIVE ERROR: ICD-10-CM

## 2024-12-19 DIAGNOSIS — J30.2 SEASONAL ALLERGIES: ICD-10-CM

## 2024-12-19 DIAGNOSIS — F33.1 MODERATE EPISODE OF RECURRENT MAJOR DEPRESSIVE DISORDER: ICD-10-CM

## 2024-12-19 DIAGNOSIS — R63.4 WEIGHT LOSS, ABNORMAL: ICD-10-CM

## 2024-12-19 DIAGNOSIS — I42.0 DILATED CARDIOMYOPATHY: ICD-10-CM

## 2024-12-19 DIAGNOSIS — C79.51 MALIGNANT NEOPLASM METASTATIC TO BONE: ICD-10-CM

## 2024-12-19 DIAGNOSIS — E11.9 TYPE 2 DIABETES MELLITUS WITHOUT COMPLICATION, WITHOUT LONG-TERM CURRENT USE OF INSULIN: ICD-10-CM

## 2024-12-19 DIAGNOSIS — E66.3 OVERWEIGHT WITH BODY MASS INDEX (BMI) OF 29 TO 29.9 IN ADULT: ICD-10-CM

## 2024-12-19 DIAGNOSIS — D69.2 PURPURA: ICD-10-CM

## 2024-12-19 DIAGNOSIS — D64.9 ANEMIA, UNSPECIFIED TYPE: ICD-10-CM

## 2024-12-19 DIAGNOSIS — Z95.810 PRESENCE OF AUTOMATIC (IMPLANTABLE) CARDIAC DEFIBRILLATOR: ICD-10-CM

## 2024-12-19 DIAGNOSIS — C34.91 NSCLC OF RIGHT LUNG: ICD-10-CM

## 2024-12-19 DIAGNOSIS — I44.7 LEFT BUNDLE-BRANCH BLOCK: ICD-10-CM

## 2024-12-19 DIAGNOSIS — M84.452D: ICD-10-CM

## 2024-12-19 DIAGNOSIS — E11.59 HYPERTENSION ASSOCIATED WITH DIABETES: ICD-10-CM

## 2024-12-19 DIAGNOSIS — K52.9 COLITIS: ICD-10-CM

## 2024-12-19 NOTE — PATIENT INSTRUCTIONS
Phone [phone]  Fax [phone]  Hours 8:00am - 4:00pm  Location [address]  Coordinator Lucero Lopez  [place] ([facility]) -- [facility]   SERVICES (Apply for Services) -- [facility]   You can complete the on line form below Or you may download the Request Form (link above),  print, complete and email it to [email or web address].   Counseling and Referral of Other Preventative  (Italic type indicates deductible and co-insurance are waived)    Patient Name: Hannah Montoya  Today's Date: 12/19/2024    Health Maintenance       Date Due Completion Date    TETANUS VACCINE Never done ---    Foot Exam 08/03/2021 8/3/2020 (Done)    Override on 8/3/2020: Done    Override on 1/21/2019: Done    Override on 12/28/2017: Done    COVID-19 Vaccine (4 - 2024-25 season) 09/01/2024 9/16/2021    Mammogram 10/02/2024 10/2/2023    Override on 6/2/2015: Done    Diabetes Urine Screening 10/16/2024 10/16/2023    Eye Exam 12/26/2024 (Originally 12/6/2024) 12/6/2023    Override on 2/8/2016: Done    Hemoglobin A1c 02/19/2025 8/19/2024    Override on 6/3/2015: Done    Lipid Panel 06/06/2025 6/6/2024    Override on 6/2/2015: Done        Orders Placed This Encounter   Procedures    Ambulatory referral/consult to ENT    Ambulatory referral/consult to Audiology     The following information is provided to all patients.  This information is to help you find resources for any of the problems found today that may be affecting your health:                  Living healthy guide: www.[facility].[place].gov      Understanding Diabetes: www.diabetes.org      Eating healthy: www.cdc.gov/healthyweight      CDC home safety checklist: www.cdc.gov/steadi/patient.html      Agency on Aging: www.goea.[place].gov      Alcoholics anonymous (AA): www.aa.org      Physical Activity: www.evan.nih.gov/te9thzm      Tobacco use: www.quitwithusla.org

## 2024-12-19 NOTE — PROGRESS NOTES
"    The patient location is: [place]  The chief complaint leading to consultation is: Medicare AWV     Visit type: audiovisual    Face to Face time with patient: 31  60 minutes of total time spent on the encounter, which includes face to face time and non-face to face time preparing to see the patient (eg, review of tests), Obtaining and/or reviewing separately obtained history, Documenting clinical information in the electronic or other health record, Independently interpreting results (not separately reported) and communicating results to the patient/family/caregiver, or Care coordination (not separately reported).         Each patient to whom he or she provides medical services by telemedicine is:  (1) informed of the relationship between the physician and patient and the respective role of any other health care provider with respect to management of the patient; and (2) notified that he or she may decline to receive medical services by telemedicine and may withdraw from such care at any time.    Notes:       Hannah Montoya presented for a  Medicare AWV and comprehensive Health Risk Assessment today. The following components were reviewed and updated:    Medical history  Family History  Social history  Allergies and Current Medications  Health Risk Assessment  Health Maintenance  Care Team         ** See Completed Assessments for Annual Wellness Visit within the encounter summary.**         The following assessments were completed:  Living Situation  CAGE  Depression Screening  Fall Risk Assessment (MACH 10)  Hearing Assessment(HHI)  Cognitive Function Screening  Nutrition Screening  ADL Screening  PAQ Screening    Opioid documentation:      Patient does not have a current opioid prescription.        Vitals:    12/19/24 1008   Weight: 73.5 kg (162 lb)   Height: 5' 2" (1.575 m)     Body mass index is 29.63 kg/m².  Physical Exam  Constitutional:       General: She is not in acute distress.     Appearance: Normal " appearance. She is well-developed and well-groomed.   Skin:     Coloration: Skin is not pale.   Neurological:      Mental Status: She is alert and oriented to person, place, and time.   Psychiatric:         Mood and Affect: Mood normal.         Speech: Speech normal.         Behavior: Behavior normal. Behavior is cooperative.         Thought Content: Thought content normal.               Diagnoses and health risks identified today and associated recommendations/orders:    1. Encounter for preventive health examination  Screenings performed, as noted above. Personal preventative testing needs reviewed.     2. Malignant neoplasm metastatic to bone  3. NSCLC of right lung  Chronic. Stable. Followed by Radiation Oncology.     4. Chronic obstructive pulmonary disease, unspecified COPD type  Chronic; stable on spiriva, wixela, albuterol prn. Followed by PCP/pulmonary.    5. 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 States it is currently a scab. Followed by PCP/wound care.     6. Type 2 diabetes mellitus without complication, without long-term current use of insulin  Chronic; stable on Insulin, metformin. Followed by Endocrinology.     7. Hypertension associated with diabetes  Chronic; stable on amlodipine, losartan, metoprolol. Followed by PCP.    8. Purpura  Chronic. Stable. Followed by PCP.     9. Aorto-iliac atherosclerosis  Chronic; stable on atorvastatin.  Followed by PCP.    10. Moderate episode of recurrent major depressive disorder  Chronic; stable on sertraline.  Followed by PCP.    11. Dilated cardiomyopathy  12. NICM (nonischemic cardiomyopathy)  13. Left bundle-branch block  14. Presence of automatic (implantable) cardiac defibrillator  Chronic. Stable with metoprolol, AICD/Defib. Followed by Cardiology.     15. Seasonal allergies  Chronic. Stable with zyrtec, nasal sprays. Followed by PCP.     16. Colitis  Chronic. Stable. Followed by PCP.     17.  Pathological fracture of intertrochanteric section of left femur with routine healing, subsequent encounter  S/p insertion of intramedullary sade 08/2024, followed by Orthopedics.     18. Nuclear sclerosis of both eyes  19. Refractive error  Chronic. Stable. Followed by Ophthalmology.     20. Anemia, unspecified type  Chronic. Stable. Followed by PCP.     21. Overweight with body mass index (BMI) of 29 to 29.9 in adult  22. Weight loss, abnormal  Patient reports some weight loss following dx Colitis in October. Weight has currently been stable. Work on diet modification and exercise as tolerated. Followed by PCP.     23. Dependence on other enabling machines and devices  Continue to use use cane as needed. Followed by Orthopedics.     24. Decreased hearing, unspecified laterality  Hearing questionnaire score 22, mild to moderate handicap. Referral to ENT/Audiology.   - Ambulatory referral/consult to ENT; Future  - Ambulatory referral/consult to Audiology; Future      Provided Hannah with a 5-10 year written screening schedule and personal prevention plan. Recommendations were developed using the USPSTF age appropriate recommendations. Education, counseling, and referrals were provided as needed. After Visit Summary printed and given to patient which includes a list of additional screenings\tests needed.    Follow up in about 1 year (around 12/19/2025) for your next annual wellness visit.    Marlin Rebolledo NP      Advance Care Planning     I offered to discuss advanced care planning, including how to pick a person who would make decisions for you if you were unable to make them for yourself, called a health care power of , and what kind of decisions you might make such as use of life sustaining treatments such as ventilators and tube feeding when faced with a life limiting illness recorded on a living will that they will need to know. (How you want to be cared for as you near the end of your natural life)      X  Patient has advanced directives on file, which we reviewed, and they do not wish to make changes.

## 2024-12-20 ENCOUNTER — PATIENT MESSAGE (OUTPATIENT)
Dept: HEMATOLOGY/ONCOLOGY | Facility: CLINIC | Age: 69
End: 2024-12-20
Payer: MEDICARE

## 2024-12-21 ENCOUNTER — CLINICAL SUPPORT (OUTPATIENT)
Dept: CARDIOLOGY | Facility: HOSPITAL | Age: 69
End: 2024-12-21
Attending: INTERNAL MEDICINE
Payer: MEDICARE

## 2024-12-21 DIAGNOSIS — Z95.810 PRESENCE OF AUTOMATIC (IMPLANTABLE) CARDIAC DEFIBRILLATOR: ICD-10-CM

## 2024-12-21 DIAGNOSIS — I42.9 CARDIOMYOPATHY, UNSPECIFIED: ICD-10-CM

## 2024-12-21 PROCEDURE — 93296 REM INTERROG EVL PM/IDS: CPT | Mod: HCNC | Performed by: INTERNAL MEDICINE

## 2024-12-23 ENCOUNTER — PATIENT MESSAGE (OUTPATIENT)
Dept: FAMILY MEDICINE | Facility: CLINIC | Age: 69
End: 2024-12-23
Payer: MEDICARE

## 2024-12-23 DIAGNOSIS — C34.91 NSCLC OF RIGHT LUNG: Primary | ICD-10-CM

## 2024-12-25 DIAGNOSIS — T66.XXXA RADIATION-INDUCED ESOPHAGITIS: ICD-10-CM

## 2024-12-25 DIAGNOSIS — F43.21 GRIEF REACTION: ICD-10-CM

## 2024-12-25 DIAGNOSIS — K20.80 RADIATION-INDUCED ESOPHAGITIS: ICD-10-CM

## 2024-12-25 DIAGNOSIS — I10 BENIGN ESSENTIAL HTN: ICD-10-CM

## 2024-12-25 NOTE — TELEPHONE ENCOUNTER
No care due was identified.  Health [facility] Embedded Care Due Messages. Reference number: [id number].   12/25/2024 12:34:34 AM CST

## 2024-12-26 RX ORDER — METOPROLOL SUCCINATE 100 MG/1
100 TABLET, EXTENDED RELEASE ORAL
Qty: 90 TABLET | Refills: 1 | Status: SHIPPED | OUTPATIENT
Start: 2024-12-26

## 2024-12-26 RX ORDER — SERTRALINE HYDROCHLORIDE 100 MG/1
100 TABLET, FILM COATED ORAL NIGHTLY
Qty: 90 TABLET | Refills: 3 | Status: SHIPPED | OUTPATIENT
Start: 2024-12-26

## 2024-12-26 RX ORDER — PANTOPRAZOLE SODIUM 40 MG/1
40 TABLET, DELAYED RELEASE ORAL
Qty: 90 TABLET | Refills: 3 | Status: SHIPPED | OUTPATIENT
Start: 2024-12-26

## 2024-12-26 RX ORDER — LOSARTAN POTASSIUM 100 MG/1
100 TABLET ORAL
Qty: 90 TABLET | Refills: 1 | Status: SHIPPED | OUTPATIENT
Start: 2024-12-26

## 2024-12-26 NOTE — TELEPHONE ENCOUNTER
Refill Routing Note   Medication(s) are not appropriate for processing by [facility] for the following reason(s):        Required vitals abnormal  ED/Hospital Visit since last OV with provider    [facility] action(s):  Defer  Approve        Medication Therapy Plan: ED documentation reviewed. No changes to therapy note    Extended chart review required: Yes     Appointments  past 12m or future 3m with PCP    Date Provider   Last Visit   9/13/2024 Emanuel Chavez MD   Next Visit   3/19/2025 Emanuel Chavez MD   ED visits in past 90 days: 3        Note composed:9:37 AM 12/26/2024

## 2025-01-07 ENCOUNTER — HOSPITAL ENCOUNTER (OUTPATIENT)
Dept: RADIOLOGY | Facility: HOSPITAL | Age: 70
Discharge: HOME | End: 2025-01-07
Attending: FAMILY MEDICINE
Payer: MEDICARE

## 2025-01-07 VITALS — HEIGHT: 62 IN | BODY MASS INDEX: 29.82 KG/M2 | WEIGHT: 162.06 LBS

## 2025-01-07 DIAGNOSIS — Z12.31 ENCOUNTER FOR SCREENING MAMMOGRAM FOR BREAST CANCER: ICD-10-CM

## 2025-01-07 PROCEDURE — 77067 SCR MAMMO BI INCL CAD: CPT | Mod: TC,HCNC

## 2025-01-07 PROCEDURE — 77063 BREAST TOMOSYNTHESIS BI: CPT | Mod: 26,HCNC,, | Performed by: RADIOLOGY

## 2025-01-07 PROCEDURE — 77067 SCR MAMMO BI INCL CAD: CPT | Mod: 26,HCNC,, | Performed by: RADIOLOGY

## 2025-01-08 ENCOUNTER — DOCUMENT SCAN (OUTPATIENT)
Dept: HOME HEALTH SERVICES | Facility: HOSPITAL | Age: 70
End: 2025-01-08
Payer: MEDICARE

## 2025-01-09 ENCOUNTER — OFFICE VISIT (OUTPATIENT)
Dept: OPTOMETRY | Facility: CLINIC | Age: 70
End: 2025-01-09
Payer: MEDICARE

## 2025-01-09 DIAGNOSIS — H52.7 REFRACTIVE ERROR: ICD-10-CM

## 2025-01-09 DIAGNOSIS — H25.13 NUCLEAR SCLEROSIS OF BOTH EYES: ICD-10-CM

## 2025-01-09 DIAGNOSIS — E11.36 TYPE 2 DIABETES MELLITUS WITH DIABETIC CATARACT, WITHOUT LONG-TERM CURRENT USE OF INSULIN: Primary | ICD-10-CM

## 2025-01-09 PROCEDURE — 92014 COMPRE OPH EXAM EST PT 1/>: CPT | Mod: HCNC,S$GLB,, | Performed by: OPTOMETRIST

## 2025-01-09 PROCEDURE — 2023F DILAT RTA XM W/O RTNOPTHY: CPT | Mod: HCNC,CPTII,S$GLB, | Performed by: OPTOMETRIST

## 2025-01-09 PROCEDURE — 3288F FALL RISK ASSESSMENT DOCD: CPT | Mod: HCNC,CPTII,S$GLB, | Performed by: OPTOMETRIST

## 2025-01-09 PROCEDURE — 1126F AMNT PAIN NOTED NONE PRSNT: CPT | Mod: HCNC,CPTII,S$GLB, | Performed by: OPTOMETRIST

## 2025-01-09 PROCEDURE — 1159F MED LIST DOCD IN RCRD: CPT | Mod: HCNC,CPTII,S$GLB, | Performed by: OPTOMETRIST

## 2025-01-09 PROCEDURE — 1157F ADVNC CARE PLAN IN RCRD: CPT | Mod: HCNC,CPTII,S$GLB, | Performed by: OPTOMETRIST

## 2025-01-09 PROCEDURE — 1100F PTFALLS ASSESS-DOCD GE2>/YR: CPT | Mod: HCNC,CPTII,S$GLB, | Performed by: OPTOMETRIST

## 2025-01-09 PROCEDURE — 1160F RVW MEDS BY RX/DR IN RCRD: CPT | Mod: HCNC,CPTII,S$GLB, | Performed by: OPTOMETRIST

## 2025-01-09 PROCEDURE — 99999 PR PBB SHADOW E&M-EST. PATIENT-LVL III: CPT | Mod: PBBFAC,HCNC,, | Performed by: OPTOMETRIST

## 2025-01-09 NOTE — PROGRESS NOTES
Subjective:       Patient ID: Hannah Montoya is a 69 y.o. female      Chief Complaint   Patient presents with    Diabetic Eye Exam     History of Present Illness  DLS: 12/6/23    No eyedrops  No eye surgery     Pohx:   None     Fohx:   Cat - mother , father   MD- grandmother     Pt here for diabetic eye exam. Pt without visual complaints today OU.  Pt   denies flashes, floaters, headaches or eye pain OU.  Pt denies itching,   tearing or burning OU. Wears pals. Does not want update mrx today    Hemoglobin A1C       Date                     Value               Ref Range             Status                08/19/2024               6.1 (H)             4.0 - 5.6 %           Final                  06/06/2024               6.3 (H)             4.0 - 5.6 %           Final                   12/06/2023               5.4                 4.0 - 5.6 %           Final                 Assessment/Plan:     1. Type 2 diabetes mellitus with diabetic cataract, without long-term current use of insulin (Primary)  No diabetic retinopathy. Discussed with pt the effects of diabetes on vision, importance of good blood sugar control, compliance with meds, and follow up care with PCP. Return in 1 year for dilated eye exam, sooner PRN.    2. Nuclear sclerosis of both eyes  Educated pt on presence of cataracts and effects on vision. No surgery at this time. Recheck in one year, sooner PRN.    3. Refractive error  Declined MRx. Continue with current spectacles.     Follow up in about 1 year (around 1/9/2026) for Diabetic Eye Exam.

## 2025-01-14 ENCOUNTER — HOSPITAL ENCOUNTER (OUTPATIENT)
Dept: RADIOLOGY | Facility: HOSPITAL | Age: 70
Discharge: HOME | End: 2025-01-14
Attending: INTERNAL MEDICINE
Payer: MEDICARE

## 2025-01-14 DIAGNOSIS — C34.91 NSCLC OF RIGHT LUNG: ICD-10-CM

## 2025-01-14 LAB
OHS CV AF BURDEN PERCENT: < 1
OHS CV BIV PACING PERCENT: 99.06 %
OHS CV DC REMOTE DEVICE TYPE: NORMAL
OHS CV RV PACING PERCENT: 99.08 %

## 2025-01-14 PROCEDURE — 71260 CT THORAX DX C+: CPT | Mod: 26,HCNC,, | Performed by: RADIOLOGY

## 2025-01-14 PROCEDURE — 71260 CT THORAX DX C+: CPT | Mod: TC,HCNC

## 2025-01-14 PROCEDURE — 74177 CT ABD & PELVIS W/CONTRAST: CPT | Mod: 26,HCNC,, | Performed by: RADIOLOGY

## 2025-01-14 PROCEDURE — 25500020 PHARM REV CODE 255: Mod: HCNC | Performed by: INTERNAL MEDICINE

## 2025-01-14 RX ADMIN — IOHEXOL 75 ML: 350 INJECTION, SOLUTION INTRAVENOUS at 01:01

## 2025-01-16 ENCOUNTER — TELEPHONE (OUTPATIENT)
Dept: HEMATOLOGY/ONCOLOGY | Facility: CLINIC | Age: 70
End: 2025-01-16
Payer: MEDICARE

## 2025-01-16 ENCOUNTER — OFFICE VISIT (OUTPATIENT)
Dept: OTOLARYNGOLOGY | Facility: CLINIC | Age: 70
End: 2025-01-16
Payer: MEDICARE

## 2025-01-16 VITALS
DIASTOLIC BLOOD PRESSURE: 84 MMHG | HEIGHT: 62 IN | SYSTOLIC BLOOD PRESSURE: 144 MMHG | WEIGHT: 162.06 LBS | BODY MASS INDEX: 29.82 KG/M2

## 2025-01-16 DIAGNOSIS — H61.23 BILATERAL IMPACTED CERUMEN: Primary | ICD-10-CM

## 2025-01-16 PROCEDURE — 3079F DIAST BP 80-89 MM HG: CPT | Mod: CPTII,S$GLB,, | Performed by: NURSE PRACTITIONER

## 2025-01-16 PROCEDURE — 1101F PT FALLS ASSESS-DOCD LE1/YR: CPT | Mod: CPTII,S$GLB,, | Performed by: NURSE PRACTITIONER

## 2025-01-16 PROCEDURE — 1159F MED LIST DOCD IN RCRD: CPT | Mod: CPTII,S$GLB,, | Performed by: NURSE PRACTITIONER

## 2025-01-16 PROCEDURE — 1126F AMNT PAIN NOTED NONE PRSNT: CPT | Mod: CPTII,S$GLB,, | Performed by: NURSE PRACTITIONER

## 2025-01-16 PROCEDURE — 3288F FALL RISK ASSESSMENT DOCD: CPT | Mod: CPTII,S$GLB,, | Performed by: NURSE PRACTITIONER

## 2025-01-16 PROCEDURE — 99213 OFFICE O/P EST LOW 20 MIN: CPT | Mod: S$GLB,,, | Performed by: NURSE PRACTITIONER

## 2025-01-16 PROCEDURE — 1157F ADVNC CARE PLAN IN RCRD: CPT | Mod: CPTII,S$GLB,, | Performed by: NURSE PRACTITIONER

## 2025-01-16 PROCEDURE — 3008F BODY MASS INDEX DOCD: CPT | Mod: CPTII,S$GLB,, | Performed by: NURSE PRACTITIONER

## 2025-01-16 PROCEDURE — 3077F SYST BP >= 140 MM HG: CPT | Mod: CPTII,S$GLB,, | Performed by: NURSE PRACTITIONER

## 2025-01-16 NOTE — PROGRESS NOTES
OTOLARYNGOLOGY CLINIC NOTE  Date:  2025     Chief complaint:  Chief Complaint   Patient presents with    Cerumen Impaction    Hearing Loss     History of Present Illness  Hannah Montoya is a 69 y.o. female  presenting today for a new evaluation and treatment of cerumen impaction and hearing loss. Pt reports her daughter has been telling her she can not hear.  Pt reports she finds herself asking people to repeat often.  Pt reports her last ear cleaning was over 10 years ago and it was with irrigation.  Pt denies any otalgia, otorrhea, or ear pressure.  Pt reports are hearing is worse on the right side.      Past Medical History  Past Medical History:   Diagnosis Date    AICD (automatic cardioverter/defibrillator) present     Asthma     bronchitis in past    Breast cancer 2016    right    Cardiac pacemaker     Cardiomyopathy     COPD (chronic obstructive pulmonary disease)     Diabetes mellitus, type 2     Hyperglycemia     Hyperlipidemia     Hypertension     Malignant neoplasm of overlapping sites of female breast 2016    Nuclear sclerosis of both eyes 2020    Respiratory distress 3/12/2020      Past Surgical History  Past Surgical History:   Procedure Laterality Date    BIOPSY, WITH CT GUIDANCE Right 2023    Procedure: LUNG MASS BIOPSY, WITH CT GUIDANCE;  Surgeon: Yehuda Green MD;  Location: [facility] CATH LAB;  Service: Radiology;  Laterality: Right;    BREAST BIOPSY Right 2016    IDC    BREAST LUMPECTOMY Right     BREAST SURGERY  2016    CARDIAC CATHETERIZATION Bilateral 2017    CARDIAC DEFIBRILLATOR PLACEMENT Left 08/10/2017    CARDIAC DEFIBRILLATOR PLACEMENT Left 2018     SECTION      x2    CHOLECYSTECTOMY      COLONOSCOPY N/A 10/03/2024    Procedure: COLONOSCOPY;  Surgeon: Wicho Senra MD;  Location: [facility];  Service: Gastroenterology;  Laterality: N/A;    ESOPHAGOGASTRODUODENOSCOPY N/A 10/03/2024    Procedure: EGD (ESOPHAGOGASTRODUODENOSCOPY);  Surgeon:  Wicho Serna MD;  Location: [facility] ENDO;  Service: Gastroenterology;  Laterality: N/A;    FEMUR BIOPSY Left 08/20/2024    Procedure: BIOPSY, FEMUR;  Surgeon: Porter Campos MD;  Location: [address];  Service: Orthopedics;  Laterality: Left;    FRACTURE SURGERY  2024    INSERTION OF TUNNELED CENTRAL VENOUS CATHETER (CVC) WITH SUBCUTANEOUS PORT Right 11/11/2020    Procedure: THYXZGYJA-GIBL-B-CATH, RIGHT;  Surgeon: Josefa Caceres MD;  Location: [address];  Service: General;  Laterality: Right;  Port-a-cath placed to R. IJ    INTRAMEDULLARY RODDING OF FEMUR Left 08/20/2024    Procedure: INSERTION, INTRAMEDULLARY ANTOINE, FEMUR;  Surgeon: Porter Campos MD;  Location: [address];  Service: Orthopedics;  Laterality: Left;    LUNG BIOPSY N/A 05/28/2020    Procedure: BIOPSY, LUNG;  Surgeon: [facility] Provider;  Location: [facility] OR;  Service: Radiology;  Laterality: N/A;  8AM START  RN PREOP 5/26/2020---COVID NEGATIVE    NAVIGATIONAL BRONCHOSCOPY N/A 10/13/2020    Procedure: BRONCHOSCOPY, NAVIGATIONAL;  Surgeon: Jamilah Weaver MD;  Location: [address];  Service: Pulmonary;  Laterality: N/A;    REPLACEMENT OF IMPLANTABLE CARDIOVERTER-DEFIBRILLATOR (ICD) GENERATOR Left 11/20/2024    Procedure: REPLACEMENT, ICD GENERATOR;  Surgeon: Javy Gates MD;  Location: [facility] EP LAB;  Service: Cardiology;  Laterality: Left;  LATOYA, CRT-D gen JEAN MARIE ansari, MAC, MB, 3 Prep    REVISION OF IMPLANTABLE CARDIOVERTER-DEFIBRILLATOR (ICD) ELECTRODE LEAD PLACEMENT N/A 06/15/2018    Procedure: REVISION-LEAD-ICD;  Surgeon: Javy Gates MD;  Location: [facility] CATH LAB;  Service: Cardiology;  Laterality: N/A;  LBBB, Bi-V ICD HIS Ld rev, MDT, Choice, MB, 3 Prep    ROBOT-ASSISTED LAPAROSCOPIC LYMPHADENECTOMY USING DA SALVADOR XI Right 10/23/2020    Procedure: XI ROBOTIC LYMPHADENECTOMY;  Surgeon: Ramo Tucker MD;  Location: [address];  Service: Thoracic;  Laterality: Right;    TUBAL  "LIGATION        Medications  Current Outpatient Medications on File Prior to Visit   Medication Sig Dispense Refill    ACCU-CHEK ALFRED PLUS TEST STRP Strp USE TO TEST THREE TIMES DAILY 200 strip 3    acetaminophen (TYLENOL) 500 MG tablet Take 2 tablets (1,000 mg total) by mouth every 8 (eight) hours as needed for Pain.      albuterol (ACCUNEB) 1.25 mg/3 mL Nebu use 1 AMPULE (3ml) via NEBULIZER EVERY 6 HOURS AS NEEDED 300 mL 3    albuterol (VENTOLIN HFA) 90 mcg/actuation inhaler Inhale 2 puffs into the lungs every 4 (four) hours as needed for Wheezing or Shortness of Breath. Rescue 18 g 4    amLODIPine (NORVASC) 5 MG tablet TAKE 1 TABLET BY MOUTH EVERY DAY 90 tablet 2    apixaban (ELIQUIS DVT-PE TREAT 30D START) 5 mg (74 tabs) DsPk For the first 7 days take two 5 mg tablets twice daily.  After 7 days take one 5 mg tablet twice daily. 74 tablet 0    atorvastatin (LIPITOR) 10 MG tablet TAKE 1 TABLET BY MOUTH EVERY DAY 90 tablet 1    BD ULTRA-FINE GIN PEN NEEDLE 32 gauge x 5/32" Ndle For once daily insulin injections 50 each 3    benzonatate (TESSALON) 200 MG capsule Take 1 capsule (200 mg total) by mouth 3 (three) times daily as needed for Cough. 30 capsule 1    buPROPion (WELLBUTRIN XL) 150 MG TB24 tablet TAKE 1 TABLET BY MOUTH EVERY DAY 90 tablet 3    cetirizine (ZYRTEC) 10 MG tablet Take 10 mg by mouth every evening.       cholecalciferol, vitamin D3, (VITAMIN D3) 50 mcg (2,000 unit) Tab Take 1 tablet (2,000 Units total) by mouth once daily. 30 tablet 11    insulin glargine U-100, Lantus, (LANTUS SOLOSTAR U-100 INSULIN) 100 unit/mL (3 mL) InPn pen Inject 12 Units into the skin every evening. 15 mL 1    losartan (COZAAR) 100 MG tablet TAKE 1 TABLET BY MOUTH EVERY DAY 90 tablet 1    metFORMIN (GLUCOPHAGE) 500 MG tablet Take 1 tablet (500 mg total) by mouth 2 (two) times daily with meals. Needs appointment for future refills 180 tablet 0    metoprolol succinate (TOPROL-XL) 100 MG 24 hr tablet TAKE 1 TABLET BY MOUTH " EVERY DAY 90 tablet 1    multivitamin (THERAGRAN) per tablet Take 1 tablet by mouth once daily.      ondansetron (ZOFRAN-ODT) 8 MG TbDL Take 1 tablet (8 mg total) by mouth every 8 (eight) hours as needed (nausea/vomiting). Take 1 tablet (8 mg) by mouth every 8 hours as needed for nausea/vomiting. 60 tablet 5    pantoprazole (PROTONIX) 40 MG tablet TAKE 1 TABLET BY MOUTH EVERY DAY 90 tablet 3    sertraline (ZOLOFT) 100 MG tablet TAKE 1 TABLET BY MOUTH EVERY DAY IN THE EVENING 90 tablet 3    tiotropium (SPIRIVA WITH HANDIHALER) 18 mcg inhalation capsule INHALE THE CONTENTS OF 1 CAPSULE BY MOUTH EVERY DAY 90 capsule 3    WIXELA INHUB 250-50 mcg/dose diskus inhaler INHALE 1 PUFF INTO THE LUNGS 2 (TWO) TIMES DAILY. CONTROLLER 180 each 3     Current Facility-Administered Medications on File Prior to Visit   Medication Dose Route Frequency Provider Last Rate Last Admin    fentaNYL injection 25 mcg  25 mcg Intravenous Q5 Min PRKeesha Mirza MD        haloperidol lactate injection 0.5 mg  0.5 mg Intravenous Once PRKeesha Mirza MD        HYDROmorphone injection 0.2 mg  0.2 mg Intravenous Q5 Min PRKeesha Mirza MD        ondansetron injection 4 mg  4 mg Intravenous Once PRKeesha Mirza MD        sodium chloride 0.9% flush 10 mL  10 mL Intravenous PRKeesha Mirza MD         Review of Systems  Review of Systems   Constitutional: Negative.    HENT:  Positive for hearing loss.    Eyes: Negative.    Respiratory: Negative.     Cardiovascular: Negative.    Gastrointestinal: Negative.    Genitourinary: Negative.    Musculoskeletal: Negative.    Skin: Negative.    Neurological: Negative.    Psychiatric/Behavioral: Negative.        Social History   reports that she quit smoking about 8 years ago. Her smoking use included cigarettes. She started smoking about 48 years ago. She has a 20 pack-year smoking history. She has been exposed to tobacco smoke. She has never used smokeless tobacco. She reports that she does not currently use  "alcohol after a past usage of about 1.0 standard drink of alcohol per week. She reports that she does not use drugs.     Family History  Family History   Problem Relation Name Age of Onset    Kidney disease Mother Hetal Rigo     Cataracts Mother Hetal Rigo     Arthritis Mother Hetal Rigo     Diabetes Mother Hetal Rigo     Kidney disease Father Betito Sierra     Cataracts Father Betito Sierra     Diabetes Father Betito Sierra     Hearing loss Father Betito Sierra     Heart disease Father Betito Sierra     Hypertension Father Betito Sierra     Stroke Father Betito Sierra     No Known Problems Sister      Clotting disorder Brother      No Known Problems Maternal Aunt      No Known Problems Paternal Aunt      No Known Problems Maternal Uncle      No Known Problems Paternal Uncle      No Known Problems Maternal Grandfather      Macular degeneration Maternal Grandmother Alva Mae     Vision loss Maternal Grandmother Alva Mae     No Known Problems Paternal Grandfather      No Known Problems Paternal Grandmother      Anxiety disorder Daughter      Anxiety disorder Son      Diabetes Brother Ramin Sierra     Breast cancer Neg Hx      Ovarian cancer Neg Hx      Amblyopia Neg Hx      Blindness Neg Hx      Cancer Neg Hx      Glaucoma Neg Hx      Retinal detachment Neg Hx      Strabismus Neg Hx      Thyroid disease Neg Hx        Physical Exam   Vitals:    01/16/25 1326   BP: (!) 144/84    Body mass index is 29.64 kg/m².  Weight: 73.5 kg (162 lb 0.6 oz)   Height: 5' 2" (157.5 cm)     GENERAL: no acute distress.  HEAD: normocephalic.   EYES: lids and lashes normal. No scleral icterus  EARS: external ear without lesion, normal pinna shape and position.  External auditory canal with cerumen impaction.   NOSE: external nose without significant bony abnormality  ORAL CAVITY/OROPHARYNX: tongue midline and mobile. Symmetric palate rise.    NECK: trachea midline.   LYMPH NODES:No cervical " lymphadenopathy.  RESPIRATORY: no stridor, no stertor. Voice normal. Respirations nonlabored.  NEURO: alert, responds to questions appropriately.   Cranial nerve exam as indicated in above sections and additionally showed facial movement symmetric with good eye closure and symmetric smile.   PSYCH:mood appropriate    Imaging:  The patient does not have any pertinent and/or recent imaging of the head and neck.     Labs:  CBC  Recent Labs   Lab 10/30/24  0956 11/06/24  1337 01/14/25  1230   WBC 4.74 5.36 6.76   Hemoglobin 9.6 L 10.4 L 10.9 L   Hematocrit 32.5 L 34.4 L 35.9 L   MCV 80 L 79 L 84   Platelets 156 151 267     BMP  Recent Labs   Lab 08/19/24  1904 08/20/24  0240 10/07/24  0627 10/20/24  0919 10/25/24  1007 10/30/24  0956 11/06/24  1337 01/14/25  1230   Glucose  --    < > 109 213 H   < > 261 H 227 H 111 H   Sodium  --    < > 137 140   < > 137 140 143   Potassium  --    < > 3.6 3.7   < > 4.0 3.8 4.1   Chloride  --    < > 116 H 101   < > 105 102 105   CO2  --    < > 16 L 26   < > 22 L 25 29   BUN  --    < > 12 18   < > 18 15 16   Creatinine  --    < > 0.8 1.0   < > 0.9 1.1 0.9   Calcium  --    < > 8.7 9.0   < > 8.8 10.1 10.1   Phosphorus  --    < > 2.2 L 1.9 L  --   --   --  3.0   Magnesium  --    < > 1.6 1.1 L  --   --   --  1.3 L   Prealbumin 20  --   --   --   --   --   --   --     < > = values in this interval not displayed.     COAGS  Recent Labs   Lab 08/19/24  0446 08/19/24  1904 10/25/24  1007   INR 1.0 1.0 1.0     Assessment  1. Bilateral impacted cerumen     Plan:  Pt advised cerumen severely impacted in canal and advised to start Debrox drop.  Pt given appt to return next week for cleaning.    Discussed plan of care with patient in detail and all questions answered. Patient reported understanding of plan of care.

## 2025-01-17 ENCOUNTER — OFFICE VISIT (OUTPATIENT)
Dept: HEMATOLOGY/ONCOLOGY | Facility: CLINIC | Age: 70
End: 2025-01-17
Payer: MEDICARE

## 2025-01-17 ENCOUNTER — LAB VISIT (OUTPATIENT)
Dept: LAB | Facility: HOSPITAL | Age: 70
End: 2025-01-17
Attending: INTERNAL MEDICINE
Payer: MEDICARE

## 2025-01-17 VITALS
OXYGEN SATURATION: 95 % | WEIGHT: 162.69 LBS | RESPIRATION RATE: 16 BRPM | HEIGHT: 62 IN | SYSTOLIC BLOOD PRESSURE: 149 MMHG | BODY MASS INDEX: 29.94 KG/M2 | DIASTOLIC BLOOD PRESSURE: 72 MMHG | TEMPERATURE: 98 F | HEART RATE: 97 BPM

## 2025-01-17 DIAGNOSIS — C79.51 MALIGNANT NEOPLASM METASTATIC TO BONE: ICD-10-CM

## 2025-01-17 DIAGNOSIS — Z95.810 CARDIAC RESYNCHRONIZATION THERAPY DEFIBRILLATOR (CRT-D) IN PLACE: ICD-10-CM

## 2025-01-17 DIAGNOSIS — K52.9 COLITIS: ICD-10-CM

## 2025-01-17 DIAGNOSIS — I42.0 DILATED CARDIOMYOPATHY: ICD-10-CM

## 2025-01-17 DIAGNOSIS — C34.91 NSCLC OF RIGHT LUNG: Primary | ICD-10-CM

## 2025-01-17 DIAGNOSIS — M25.552 LEFT HIP PAIN: ICD-10-CM

## 2025-01-17 DIAGNOSIS — J70.0 RADIATION PNEUMONITIS: ICD-10-CM

## 2025-01-17 DIAGNOSIS — I44.7 LEFT BUNDLE-BRANCH BLOCK: ICD-10-CM

## 2025-01-17 DIAGNOSIS — Z85.3 HISTORY OF BREAST CANCER IN FEMALE: ICD-10-CM

## 2025-01-17 DIAGNOSIS — C77.1 SECONDARY AND UNSPECIFIED MALIGNANT NEOPLASM OF INTRATHORACIC LYMPH NODES: ICD-10-CM

## 2025-01-17 DIAGNOSIS — C34.91 NSCLC OF RIGHT LUNG: ICD-10-CM

## 2025-01-17 DIAGNOSIS — E83.52 MALIGNANCY ASSOCIATED HYPERCALCEMIA: ICD-10-CM

## 2025-01-17 DIAGNOSIS — M89.9 LYTIC BONE LESION OF LEFT FEMUR: ICD-10-CM

## 2025-01-17 PROCEDURE — 1126F AMNT PAIN NOTED NONE PRSNT: CPT | Mod: HCNC,CPTII,S$GLB, | Performed by: INTERNAL MEDICINE

## 2025-01-17 PROCEDURE — 3288F FALL RISK ASSESSMENT DOCD: CPT | Mod: HCNC,CPTII,S$GLB, | Performed by: INTERNAL MEDICINE

## 2025-01-17 PROCEDURE — 99999 PR PBB SHADOW E&M-EST. PATIENT-LVL V: CPT | Mod: PBBFAC,HCNC,, | Performed by: INTERNAL MEDICINE

## 2025-01-17 PROCEDURE — 3008F BODY MASS INDEX DOCD: CPT | Mod: HCNC,CPTII,S$GLB, | Performed by: INTERNAL MEDICINE

## 2025-01-17 PROCEDURE — 3078F DIAST BP <80 MM HG: CPT | Mod: HCNC,CPTII,S$GLB, | Performed by: INTERNAL MEDICINE

## 2025-01-17 PROCEDURE — 3077F SYST BP >= 140 MM HG: CPT | Mod: HCNC,CPTII,S$GLB, | Performed by: INTERNAL MEDICINE

## 2025-01-17 PROCEDURE — 1160F RVW MEDS BY RX/DR IN RCRD: CPT | Mod: HCNC,CPTII,S$GLB, | Performed by: INTERNAL MEDICINE

## 2025-01-17 PROCEDURE — G2211 COMPLEX E/M VISIT ADD ON: HCPCS | Mod: HCNC,S$GLB,, | Performed by: INTERNAL MEDICINE

## 2025-01-17 PROCEDURE — 1101F PT FALLS ASSESS-DOCD LE1/YR: CPT | Mod: HCNC,CPTII,S$GLB, | Performed by: INTERNAL MEDICINE

## 2025-01-17 PROCEDURE — 1157F ADVNC CARE PLAN IN RCRD: CPT | Mod: HCNC,CPTII,S$GLB, | Performed by: INTERNAL MEDICINE

## 2025-01-17 PROCEDURE — 86480 TB TEST CELL IMMUN MEASURE: CPT | Mod: HCNC | Performed by: NURSE PRACTITIONER

## 2025-01-17 PROCEDURE — 1159F MED LIST DOCD IN RCRD: CPT | Mod: HCNC,CPTII,S$GLB, | Performed by: INTERNAL MEDICINE

## 2025-01-17 PROCEDURE — 99215 OFFICE O/P EST HI 40 MIN: CPT | Mod: HCNC,S$GLB,, | Performed by: INTERNAL MEDICINE

## 2025-01-17 NOTE — PROGRESS NOTES
ONCOLOGY FOLLOW UP VISIT.       Cancer/Stage/TNM:    Cancer Staging   NSCLC of right lung  Staging form: Lung, AJCC 8th Edition  - Clinical stage from 9/29/2020: Stage IIIA (cT3, cN1, cM0) - Signed by Ramo Tucker MD on 10/24/2020  - Clinical stage from 7/31/2024: Stage DEREK (cT3, cN2, cM1a) - Signed by Javi Avina MD on 8/2/2024         Oncology History   NSCLC of right lung   9/29/2020 Cancer Staged    Staging form: Lung, AJCC 8th Edition  - Clinical stage from 9/29/2020: Stage IIIA (cT3, cN1, cM0)     10/14/2020 Initial Diagnosis    NSCLC of right lung     11/17/2020 - 12/30/2020 Radiation Therapy    Treating physician: Harvinder Lara     Site  Technique  Energy  Dose/Fx (Gy)  #Fx  Total Dose (Gy)    RLL Lung  VMAT  6X  2  30 / 30  60         2/6/2023 -  Chemotherapy    Treatment Summary   Plan Name: OP NSCLC NIVOLUMAB 360 MG D1 & D22 WITH IPILIMUMAB 1 MG/KG Q6W PLUS CARBOPLATIN (AUC) PACLITAXEL Q3W X2 DOSES  Treatment Goal: Control  Status: Active  Start Date: 2/6/2023  End Date: 2/19/2026 (Planned)  Provider: Javi Avina MD  Chemotherapy: CARBOplatin (PARAPLATIN) 525 mg in sodium chloride 0.9% 337.5 mL chemo infusion, 525 mg, Intravenous, Clinic/HOD 1 time, 1 of 1 cycle  Administration: 525 mg (2/6/2023), 490 mg (2/27/2023)  PACLitaxeL (TAXOL) 200 mg/m2 = 396 mg in sodium chloride 0.9% 500 mL chemo infusion, 200 mg/m2 = 396 mg (100 % of original dose 200 mg/m2), Intravenous, Clinic/HOD 1 time, 1 of 1 cycle  Dose modification: 200 mg/m2 (original dose 200 mg/m2, Cycle 1), 200 mg/m2 (original dose 200 mg/m2, Cycle 1)  Administration: 396 mg (2/6/2023), 396 mg (2/27/2023)     6/12/2023 - 6/30/2023 Radiation Therapy    Treating physician: Harvinder Vásquez Technique Energy Dose/Fx (Gy) #Fx Total Dose (Gy)   RLL Lung RtLung 6X 4 15 / 15 60        7/31/2024 Cancer Staged    Staging form: Lung, AJCC 8th Edition  - Clinical stage from 7/31/2024: Stage DEREK (cT3, cN2, cM1a)          Interval  History:   Now requiring insulin after receiving steroids for colitis. Completed steroids at the end of October. Also had open leg wounds that developed that resulted from worsening diabetes. Had delays in getting her pacemaker. Still having intermittent diarrhea. Stool is green.       ROS:  Review of Systems   Constitutional:  Negative for chills, fever and weight loss.   HENT:  Negative for hearing loss, nosebleeds and sore throat.    Eyes:  Negative for blurred vision and double vision.   Respiratory:  Negative for cough, hemoptysis, shortness of breath and wheezing.    Cardiovascular:  Negative for chest pain and leg swelling.   Gastrointestinal:  Negative for abdominal pain, blood in stool, diarrhea, heartburn, nausea and vomiting.   Genitourinary:  Negative for dysuria, frequency and hematuria.   Musculoskeletal:  Positive for back pain (right sided) and joint pain (L hip pain). Negative for myalgias.   Skin:  Positive for rash (under left breast). Negative for itching.   Neurological:  Positive for weakness. Negative for dizziness, tingling, sensory change, speech change and headaches.   Endo/Heme/Allergies:  Does not bruise/bleed easily.   Psychiatric/Behavioral:  The patient is not nervous/anxious.           A complete 12-point review of systems was reviewed and is negative except as mentioned above.     Past Medical History:   Past Medical History:   Diagnosis Date    AICD (automatic cardioverter/defibrillator) present     Asthma     bronchitis in past    Breast cancer 2016    right    Cardiac pacemaker     Cardiomyopathy     COPD (chronic obstructive pulmonary disease)     Diabetes mellitus, type 2     Hyperglycemia     Hyperlipidemia     Hypertension     Malignant neoplasm of overlapping sites of female breast 2/12/2016    Nuclear sclerosis of both eyes 8/12/2020    Respiratory distress 3/12/2020        Allergies:   Review of patient's allergies indicates:   Allergen Reactions    Taxol [paclitaxel]       "Hypersensitivity reaction to taxol, symptoms included shortness of breath, nausea, dizziness, flushing     Carboplatin Other (See Comments)     Itching and hives    Adhesive Rash     tegaderm burns and blistered skin        Medications:   Current Outpatient Medications   Medication Sig Dispense Refill    ACCU-CHEK ALFRED PLUS TEST STRP Strp USE TO TEST THREE TIMES DAILY 200 strip 3    acetaminophen (TYLENOL) 500 MG tablet Take 2 tablets (1,000 mg total) by mouth every 8 (eight) hours as needed for Pain.      albuterol (ACCUNEB) 1.25 mg/3 mL Nebu use 1 AMPULE (3ml) via NEBULIZER EVERY 6 HOURS AS NEEDED 300 mL 3    albuterol (VENTOLIN HFA) 90 mcg/actuation inhaler Inhale 2 puffs into the lungs every 4 (four) hours as needed for Wheezing or Shortness of Breath. Rescue 18 g 4    amLODIPine (NORVASC) 5 MG tablet TAKE 1 TABLET BY MOUTH EVERY DAY 90 tablet 2    apixaban (ELIQUIS DVT-PE TREAT 30D START) 5 mg (74 tabs) DsPk For the first 7 days take two 5 mg tablets twice daily.  After 7 days take one 5 mg tablet twice daily. 74 tablet 0    atorvastatin (LIPITOR) 10 MG tablet TAKE 1 TABLET BY MOUTH EVERY DAY 90 tablet 1    BD ULTRA-FINE GIN PEN NEEDLE 32 gauge x 5/32" Ndle For once daily insulin injections 50 each 3    benzonatate (TESSALON) 200 MG capsule Take 1 capsule (200 mg total) by mouth 3 (three) times daily as needed for Cough. 30 capsule 1    buPROPion (WELLBUTRIN XL) 150 MG TB24 tablet TAKE 1 TABLET BY MOUTH EVERY DAY 90 tablet 3    cetirizine (ZYRTEC) 10 MG tablet Take 10 mg by mouth every evening.       cholecalciferol, vitamin D3, (VITAMIN D3) 50 mcg (2,000 unit) Tab Take 1 tablet (2,000 Units total) by mouth once daily. 30 tablet 11    insulin glargine U-100, Lantus, (LANTUS SOLOSTAR U-100 INSULIN) 100 unit/mL (3 mL) InPn pen Inject 12 Units into the skin every evening. 15 mL 1    losartan (COZAAR) 100 MG tablet TAKE 1 TABLET BY MOUTH EVERY DAY 90 tablet 1    metFORMIN (GLUCOPHAGE) 500 MG tablet Take 1 tablet " "(500 mg total) by mouth 2 (two) times daily with meals. Needs appointment for future refills 180 tablet 0    metoprolol succinate (TOPROL-XL) 100 MG 24 hr tablet TAKE 1 TABLET BY MOUTH EVERY DAY 90 tablet 1    multivitamin (THERAGRAN) per tablet Take 1 tablet by mouth once daily.      ondansetron (ZOFRAN-ODT) 8 MG TbDL Take 1 tablet (8 mg total) by mouth every 8 (eight) hours as needed (nausea/vomiting). Take 1 tablet (8 mg) by mouth every 8 hours as needed for nausea/vomiting. 60 tablet 5    pantoprazole (PROTONIX) 40 MG tablet TAKE 1 TABLET BY MOUTH EVERY DAY 90 tablet 3    sertraline (ZOLOFT) 100 MG tablet TAKE 1 TABLET BY MOUTH EVERY DAY IN THE EVENING 90 tablet 3    tiotropium (SPIRIVA WITH HANDIHALER) 18 mcg inhalation capsule INHALE THE CONTENTS OF 1 CAPSULE BY MOUTH EVERY DAY 90 capsule 3    WIXELA INHUB 250-50 mcg/dose diskus inhaler INHALE 1 PUFF INTO THE LUNGS 2 (TWO) TIMES DAILY. CONTROLLER 180 each 3     No current facility-administered medications for this visit.     Facility-Administered Medications Ordered in Other Visits   Medication Dose Route Frequency Provider Last Rate Last Admin    fentaNYL injection 25 mcg  25 mcg Intravenous Q5 Min PRN Keesha Martins MD        haloperidol lactate injection 0.5 mg  0.5 mg Intravenous Once PRN Keesha Martins MD        HYDROmorphone injection 0.2 mg  0.2 mg Intravenous Q5 Min PRKeesha Mirza MD        ondansetron injection 4 mg  4 mg Intravenous Once PRN Keesha Martins MD        sodium chloride 0.9% flush 10 mL  10 mL Intravenous PRN Keesha Martins MD            Physical Exam:   BP (!) 149/72 (BP Location: Left arm, Patient Position: Sitting)   Pulse 97   Temp 98.4 °F (36.9 °C) (Oral)   Resp 16   Ht 5' 2" (1.575 m)   Wt 73.8 kg (162 lb 11.2 oz)   LMP  (LMP Unknown)   SpO2 95%   BMI 29.76 kg/m²      ECOG Performance Status: (foot note - ECOG PS provided by Eastern Cooperative Oncology Group) 0 - Asymptomatic    Physical Exam  Vitals and nursing note reviewed. "   Constitutional:       Appearance: Normal appearance. She is well-developed and normal weight.   HENT:      Head: Normocephalic and atraumatic.   Eyes:      Conjunctiva/sclera: Conjunctivae normal.      Pupils: Pupils are equal, round, and reactive to light.   Pulmonary:      Effort: Pulmonary effort is normal. No respiratory distress.   Abdominal:      General: There is no distension.      Palpations: Abdomen is soft.   Musculoskeletal:         General: No swelling. Normal range of motion.      Cervical back: Normal range of motion and neck supple.      Left ankle: Swelling (trace) present.   Lymphadenopathy:      Cervical: No cervical adenopathy.   Skin:     General: Skin is warm and dry.      Findings: No rash.   Neurological:      General: No focal deficit present.      Mental Status: She is alert and oriented to person, place, and time.   Psychiatric:         Mood and Affect: Mood and affect normal.         Behavior: Behavior normal.                 Labs:   No results found for this or any previous visit (from the past 48 hours).               Imaging:   CT Chest Abdomen Pelvis With IV Contrast (XPD) NO Oral Contrast  Narrative: EXAMINATION:  CT CHEST ABDOMEN PELVIS WITH IV CONTRAST (XPD)    CLINICAL HISTORY:  Non-small cell lung cancer (NSCLC), metastatic, assess treatment response;Malignant neoplasm of unspecified part of right bronchus or lung    TECHNIQUE:  Low dose axial images, sagittal and coronal reformations were obtained from the thoracic inlet to the pubic symphysis after IV administration of 75 cc of Omnipaque.    COMPARISON:  09/18/2024    FINDINGS:  Chest:    Heart and mediastinal vasculature: Left-sided pacemaker.  Heart mildly enlarged.  Aortic atherosclerosis.    Larissa/Mediastinum: Precarinal and subcarinal lymph nodes are decreased in size significantly.  Right hilar adenopathy/mass also decreased in size.  Para-aortic/posterior mediastinal adenopathy has essentially resolved.    Lungs: Volume  loss in the right hemithorax and right basilar lung consolidation without air bronchograms appears similar to the prior study with a small loculated right-sided pleural effusion.  Parenchymal scarring in the aerated right lung.  Spiculated nodule in the left lung base is stable.  Extra cardiac pacemaker wires in the left pleural space anteriorly.  There is adjacent lung scarring.    Abdomen and pelvis:    Liver: Normal in size.  Hypoattenuation of the hepatic parenchyma adjacent to the gallbladder fundus and adjacent to the gallbladder body are more conspicuous than on the prior exam is.    Gallbladder: Absent    Bile Ducts: No evidence of dilated ducts.    Pancreas: No mass or peripancreatic fat stranding.    Spleen: Unremarkable.    Adrenals: Generalized left adrenal gland thickening appears similar to prior.  Right adrenal gland unremarkable.    Kidneys/ Ureters: Normal in size and location. No hydronephrosis, solid mass, or nephrolithiasis.  Fetal lobulation versus parenchymal scarring bilaterally.    GI Tract/Mesentery: There is some mucosal hyperenhancement and wall thickening of the rectum and distal sigmoid colon.  Large bowel otherwise unremarkable and small bowel loops have a benign appearance.    Peritoneal Space: No ascites. No free air.    Retroperitoneum: No significant adenopathy.    Vasculature: Aortic atherosclerosis is present.    Bladder: No evidence of wall thickening.    Reproductive organs: Unremarkable.    Bones: No suspicious lytic or blastic lesions.  Impression: In this patient with provided history of non-small cell lung cancer there is stable atelectasis/collapse of the right lower lobe.  No discrete mass able to be measured.    Precarinal, right hilar, and posterior mediastinal adenopathy has improved.    Stable spiculated nodule in the left lower lobe for which attention on follow-up is suggested.    Increasing conspicuity of pericholecystic hypoattenuation within the liver.  This may  represent areas of focal fatty infiltration but as it is a new finding, metastatic disease is a consideration.  Further evaluation with MRI with and without contrast recommended.    Rectosigmoid wall enhancement and thickening which could be related to proctocolitis.  Correlate clinically.    RECIST SUMMARY:    Date of prior examination for comparison: 09/18/2024    Lesion 1: Precarinal lymph node.  1.3 cm. Series 2 image 50.  Prior measurement 1.7 cm.    Lesion 2: Right hilar lymph node.  0 cm. Series 2 image 57.  Prior measurement 1.7 cm.    Lesion 3: Posterior mediastinal lymph node.  0 cm. Series 2 image 80.  Prior measurement 1.2 cm.    Lesion 4: Left lower lobe.  0.9 cm. Series 6 image 372.  Prior measurement 0.9 cm.    Electronically signed by: Serafin Wallace Jr  Date:    01/14/2025  Time:    14:23            Diagnoses:       1. NSCLC of right lung    2. Secondary and unspecified malignant neoplasm of intrathoracic lymph nodes    3. Lytic bone lesion of left femur s/p IM nail on 8/20/2024    4. Left hip pain    5. Colitis    6. Radiation pneumonitis    7. History of breast cancer in female    8. Dilated cardiomyopathy    9. Left bundle-branch block    10. Cardiac resynchronization therapy defibrillator (CRT-D) in place    11. Malignancy associated hypercalcemia    12. Malignant neoplasm metastatic to bone      Assessment and Plan:         1,2. Recurrent NSCLC  Plan is for definitive chemoRT, will need re-staging scans prior.  Reviewed with patient in detail her diagnosis, stage, treatment options, and prognosis (3 year OS 66% vs. 43.5% without durvalumab) with standard of care chemoRT followed by maintenance immunotherapy.  Patient has consented for [id number] clinical trial, a randomized control trial evaluating combination chemoRT + durvalumab followed by maintenance vs. SOC chemoRT -> maintenance.  CT scans 7/2021 with CR.  - patient randomized on [id number] to SOC arm (Imfinzi) - completed 12/2021.    - CT  scan 12/2022 with progressing pulmonary nodule in LLL, still with stable disease in RLL consolidation. PET scan also showing enlarging right lower lobe mass with increased hypermetabolic activity concerning for recurrence. Will present her case at the next thoracic tumor board to discuss biopsy and possible treatment options.   - Biopsy of lung mass consistent with SCC. Initiated on 9LA. Initial re-staging scans with stable disease.   - Patient has completed RT 4-5 weeks ago and has no symptoms. She has some mild inflammation on Chest CT, but since asymptomatic and completed RT will re-initiate IO.  - Now s/p 3 cycles of 9LA. With persistent IO induced rash and continued pneumonitis on recent imaging, plan to hold IO and proceed with surveillance.  - 10/16/23 CT showing continued evidence of inflammation/infection. Currently has cold symptoms. +Covid.   - Repeat CT scan (preliminary reading) shows improvement in inflammation but indeterminate possible new liver metastases. Will confirm with PETCT.  - CT CAP is showing progression of disease in L hilum, hilar LN, mediastinal LN, and pleural effusion.   - Plan is to restart ipi + nivo. Not eligible for any trials.   - Holding Ipi Nivo for colitis. Will re-challenge after giving loading doses of Entyvio. Imaging reviewed today and shows response to one dose of Ipi/Nivo.     3,4. Recovering from left femur nailing. Has oxy IR for pain. PT/OT ordered. Will get bone scans with re-staging to evaluate for metastatic bone disease.    5. Grade 4 needing hospitalization. Required multiple days of steroids prior to resolution of symptoms.   - Still having symptoms off steroids. Delays in starting Entyvio d/t canceling appointments and requiring other procedures. Consented today. Will repeat TB gold for indeterminate reading. Give C1 next available.     5. Resolved. Discussed symptoms to report with re-challenging immunotherapy.     6.  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      7-9.  Stable, follows with cardiology. AICD exchanged 11/2024.     9,10. Hypercalcemia resolved. Zometa awaiting Dental clearance. Has not seen a dentist in 4 years.     Patient was also seen and examined by Dr. Avina. Patient is in agreement with the proposed treatment plan. All questions were answered to the patient's satisfaction. Pt knows to call clinic if anything is needed before the next clinic visit.    Aide Magaña, MSN, APRN, FNP-C  Hematology and Medical Oncology  Nurse Practitioner to Dr. Javi Avina  Nurse Practitioner, [facility]      Patient is in agreement with the proposed treatment plan. All questions were answered to the patient's satisfaction. Pt knows to call clinic if anything is needed before the next clinic visit.    MDM includes:    - Acute or chronic illness or injury that poses a threat to life or bodily function  - Independent review and explanation of 2 results from unique tests  - Discussion of management and ordering 2 unique tests  - Extensive discussion of treatment and management  - Prescription drug management  - Drug therapy requiring intensive monitoring for toxicity    Javi Avina M.D.  Hematology/Oncology Attending  Director [facility] Program  [facility]          Route Chart for Scheduling    Med Onc Chart Routing      Follow up with physician    Follow up with DANIEL Other. Entyvio next available (need to review TB gold test before scheduling); RTC 2 weeks after 1st Entyvio infusion for seconf infusion.   Infusion scheduling note    Injection scheduling note    Labs CBC and CMP   Scheduling:  Preferred lab:  Lab interval:  in 2 weeks   Imaging    Pharmacy appointment    Other referrals                  Treatment Plan Information   OP NSCLC NIVOLUMAB 360 MG D1 & D22 WITH IPILIMUMAB 1 MG/KG Q6W PLUS CARBOPLATIN (AUC) PACLITAXEL Q3W X2 DOSES Javi Avina MD   Associated diagnosis:  Lung mass   noted on 3/12/2020  Associated diagnosis: NSCLC of right lung Stage IIIA, Stage DEREK cT3, cN1, cM0, cT3, cN2, cM1a noted on 10/14/2020   Line of treatment: First Line  Treatment Goal: Control     Upcoming Treatment Dates - OP NSCLC NIVOLUMAB 360 MG D1 & D22 WITH IPILIMUMAB 1 MG/KG Q6W PLUS CARBOPLATIN (AUC) PACLITAXEL Q3W X2 DOSES    10/3/2024       Immunotherapy       nivolumab 360 mg in 0.9% NaCl 86 mL infusion  10/24/2024       Immunotherapy       nivolumab 360 mg in 0.9% NaCl 86 mL infusion       ipilimumab (YERVOY) 1 mg/kg = 81 mg in 0.9% NaCl 66.2 mL chemo infusion  11/14/2024       Immunotherapy       nivolumab 360 mg in 0.9% NaCl 86 mL infusion  12/5/2024       Immunotherapy       nivolumab 360 mg in 0.9% NaCl 86 mL infusion       ipilimumab (YERVOY) 1 mg/kg = 81 mg in 0.9% NaCl 66.2 mL chemo infusion    Supportive Plan Information  OP ZOLEDRONIC ACID (ZOMETA) Q4W Aide Magaña NP   Associated Diagnosis: Malignant neoplasm metastatic to bone   noted on 8/21/2024   Line of treatment: Supportive Care   Treatment goal: Supportive     Upcoming Treatment Dates - OP ZOLEDRONIC ACID (ZOMETA) Q4W    9/16/2024       Medications       zoledronic 4 mg/100 mL infusion 4 mg  10/14/2024       Medications       zoledronic 4 mg/100 mL infusion 4 mg  11/11/2024       Medications       zoledronic 4 mg/100 mL infusion 4 mg  12/9/2024       Medications       zoledronic 4 mg/100 mL infusion 4 mg    Therapy Plan Information  ENTYVIO GI for Colitis, noted on 10/1/2024  Pre-Medications  acetaminophen tablet 650 mg  650 mg, Oral, Every visit  cetirizine tablet 10 mg  10 mg, Oral, Every visit  diphenhydrAMINE (BENADRYL) 25 mg in 0.9% NaCl 50 mL IVPB  25 mg, Intravenous, Once  Medications  vedolizumab (ENTYVIO) 300 mg/250 mL NS IVPB  300 mg, Intravenous, Once  vedolizumab (ENTYVIO) 300 mg/250 mL NS IVPB  300 mg, Intravenous, Treatments: 2, 3  vedolizumab (ENTYVIO) 300 mg/250 mL NS IVPB  300 mg, Intravenous, Every 8  weeks  PRN Medications  acetaminophen tablet 650 mg  650 mg, Oral, PRN  diphenhydrAMINE injection 25 mg  25 mg, Intravenous, Once  albuterol-ipratropium 2.5 mg-0.5 mg/3 mL nebulizer solution 3 mL  3 mL, Nebulization, PRN  methylPREDNISolone sodium succinate injection 40 mg  40 mg, Intravenous, PRN  EPINEPHrine (PF) injection 0.3 mg  0.3 mg, Subcutaneous, PRN  Flushes  0.9% NaCl 250 mL flush bag  Intravenous, Every visit  sodium chloride 0.9% flush 10 mL  10 mL, Intravenous, Every visit  heparin, porcine (PF) 100 unit/mL injection flush 500 Units  500 Units, Intravenous, Every visit  alteplase injection 2 mg  2 mg, Intra-Catheter, Every visit      No therapy plan of the specified type found.    No therapy plan of the specified type found.

## 2025-01-18 LAB
GAMMA INTERFERON BACKGROUND BLD IA-ACNC: 0 IU/ML
M TB IFN-G CD4+ BCKGRND COR BLD-ACNC: 0 IU/ML
M TB IFN-G CD4+ BCKGRND COR BLD-ACNC: 0 IU/ML
MITOGEN IGNF BCKGRD COR BLD-ACNC: 10 IU/ML
TB GOLD PLUS: NEGATIVE

## 2025-01-24 ENCOUNTER — OFFICE VISIT (OUTPATIENT)
Dept: OTOLARYNGOLOGY | Facility: CLINIC | Age: 70
End: 2025-01-24
Payer: MEDICARE

## 2025-01-24 ENCOUNTER — PATIENT MESSAGE (OUTPATIENT)
Dept: OTOLARYNGOLOGY | Facility: CLINIC | Age: 70
End: 2025-01-24

## 2025-01-24 VITALS — SYSTOLIC BLOOD PRESSURE: 157 MMHG | HEART RATE: 109 BPM | DIASTOLIC BLOOD PRESSURE: 92 MMHG

## 2025-01-24 DIAGNOSIS — H61.23 BILATERAL IMPACTED CERUMEN: Primary | ICD-10-CM

## 2025-01-24 PROCEDURE — 99499 UNLISTED E&M SERVICE: CPT | Mod: S$GLB,,, | Performed by: NURSE PRACTITIONER

## 2025-01-24 NOTE — PROGRESS NOTES
Procedure Note   Procedure performed:microscopic examination of ears with cerumen disimpaction    Indication for procedure: bilateral cerumen impaction     Description of procedure:  After verbal consent was obtained, the patient was positioned in semi recumbent position and speculum was placed in the right ear and microscope brought into the field.  The microscope was positioned and magnification adjusted for appropriate visualization. Otologic instruments including various size otologic suctions and curette were used to attempt to remove the cerumen from the right external auditory canals under visualization with the operating microscope. Pt was unable to tolerate cleaning and procedure stopped.        Pt advised to use Debrox drops 3 times a day and rtc for cleaning in 2-3 weeks.

## 2025-01-28 ENCOUNTER — TELEPHONE (OUTPATIENT)
Dept: HEMATOLOGY/ONCOLOGY | Facility: CLINIC | Age: 70
End: 2025-01-28
Payer: MEDICARE

## 2025-01-28 NOTE — TELEPHONE ENCOUNTER
"----- Message from Ishan sent at 1/28/2025  3:46 PM CST -----  Consult/Advisory    Name Of Caller: Self    Contact Preference?: [phone] (home)     Provider Name: Fabrice    Does patient feel the need to be seen today? No    What is the nature of the call?: Returning call to Dao    Additional Notes:  "Thank you for all that you do for our patients"  "

## 2025-02-07 ENCOUNTER — INFUSION (OUTPATIENT)
Dept: INFUSION THERAPY | Facility: HOSPITAL | Age: 70
End: 2025-02-07
Payer: MEDICARE

## 2025-02-07 VITALS
TEMPERATURE: 98 F | DIASTOLIC BLOOD PRESSURE: 72 MMHG | SYSTOLIC BLOOD PRESSURE: 158 MMHG | BODY MASS INDEX: 29.94 KG/M2 | HEIGHT: 62 IN | RESPIRATION RATE: 18 BRPM | HEART RATE: 86 BPM | WEIGHT: 162.69 LBS

## 2025-02-07 DIAGNOSIS — E11.9 TYPE 2 DIABETES MELLITUS WITHOUT COMPLICATION, WITHOUT LONG-TERM CURRENT USE OF INSULIN: ICD-10-CM

## 2025-02-07 DIAGNOSIS — K52.9 COLITIS: Primary | ICD-10-CM

## 2025-02-07 PROCEDURE — 25000003 PHARM REV CODE 250: Mod: HCNC | Performed by: INTERNAL MEDICINE

## 2025-02-07 PROCEDURE — 63600175 PHARM REV CODE 636 W HCPCS: Mod: HCNC | Performed by: INTERNAL MEDICINE

## 2025-02-07 PROCEDURE — 96367 TX/PROPH/DG ADDL SEQ IV INF: CPT | Mod: HCNC

## 2025-02-07 PROCEDURE — 96365 THER/PROPH/DIAG IV INF INIT: CPT | Mod: HCNC

## 2025-02-07 RX ORDER — CETIRIZINE HYDROCHLORIDE 10 MG/1
10 TABLET ORAL ONCE
Status: CANCELLED | OUTPATIENT
Start: 2025-03-29 | End: 2025-03-29

## 2025-02-07 RX ORDER — METHYLPREDNISOLONE SOD SUCC 125 MG
40 VIAL (EA) INJECTION
Status: CANCELLED | OUTPATIENT
Start: 2025-03-29

## 2025-02-07 RX ORDER — SODIUM CHLORIDE 0.9 % (FLUSH) 0.9 %
10 SYRINGE (ML) INJECTION
Status: DISCONTINUED | OUTPATIENT
Start: 2025-02-07 | End: 2025-02-07 | Stop reason: HOSPADM

## 2025-02-07 RX ORDER — DIPHENHYDRAMINE HYDROCHLORIDE 50 MG/ML
25 INJECTION INTRAMUSCULAR; INTRAVENOUS
Status: DISCONTINUED | OUTPATIENT
Start: 2025-02-07 | End: 2025-02-07 | Stop reason: HOSPADM

## 2025-02-07 RX ORDER — HEPARIN 100 UNIT/ML
500 SYRINGE INTRAVENOUS
Status: CANCELLED | OUTPATIENT
Start: 2025-03-29

## 2025-02-07 RX ORDER — ATORVASTATIN CALCIUM 10 MG/1
10 TABLET, FILM COATED ORAL
Qty: 90 TABLET | Refills: 1 | Status: SHIPPED | OUTPATIENT
Start: 2025-02-07

## 2025-02-07 RX ORDER — CETIRIZINE HYDROCHLORIDE 10 MG/1
10 TABLET ORAL ONCE
Status: COMPLETED | OUTPATIENT
Start: 2025-02-07 | End: 2025-02-07

## 2025-02-07 RX ORDER — HEPARIN 100 UNIT/ML
500 SYRINGE INTRAVENOUS
Status: DISCONTINUED | OUTPATIENT
Start: 2025-02-07 | End: 2025-02-07 | Stop reason: HOSPADM

## 2025-02-07 RX ORDER — ACETAMINOPHEN 325 MG/1
650 TABLET ORAL ONCE
Status: CANCELLED | OUTPATIENT
Start: 2025-03-29 | End: 2025-03-29

## 2025-02-07 RX ORDER — ACETAMINOPHEN 325 MG/1
650 TABLET ORAL ONCE
Status: COMPLETED | OUTPATIENT
Start: 2025-02-07 | End: 2025-02-07

## 2025-02-07 RX ORDER — IPRATROPIUM BROMIDE AND ALBUTEROL SULFATE 2.5; .5 MG/3ML; MG/3ML
3 SOLUTION RESPIRATORY (INHALATION)
Status: CANCELLED | OUTPATIENT
Start: 2025-03-29

## 2025-02-07 RX ORDER — ACETAMINOPHEN 325 MG/1
650 TABLET ORAL
Status: CANCELLED | OUTPATIENT
Start: 2025-03-29

## 2025-02-07 RX ORDER — DIPHENHYDRAMINE HYDROCHLORIDE 50 MG/ML
25 INJECTION INTRAMUSCULAR; INTRAVENOUS
Status: CANCELLED | OUTPATIENT
Start: 2025-03-29

## 2025-02-07 RX ORDER — EPINEPHRINE 1 MG/ML
0.3 INJECTION, SOLUTION, CONCENTRATE INTRAVENOUS
Status: CANCELLED | OUTPATIENT
Start: 2025-03-29

## 2025-02-07 RX ORDER — SODIUM CHLORIDE 0.9 % (FLUSH) 0.9 %
10 SYRINGE (ML) INJECTION
Status: CANCELLED | OUTPATIENT
Start: 2025-03-29

## 2025-02-07 RX ADMIN — DIPHENHYDRAMINE HYDROCHLORIDE 25 MG: 50 INJECTION INTRAMUSCULAR; INTRAVENOUS at 11:02

## 2025-02-07 RX ADMIN — SODIUM CHLORIDE: 9 INJECTION, SOLUTION INTRAVENOUS at 11:02

## 2025-02-07 RX ADMIN — VEDOLIZUMAB 300 MG: 300 INJECTION, POWDER, LYOPHILIZED, FOR SOLUTION INTRAVENOUS at 12:02

## 2025-02-07 RX ADMIN — CETIRIZINE HYDROCHLORIDE 10 MG: 10 TABLET, FILM COATED ORAL at 11:02

## 2025-02-07 RX ADMIN — ACETAMINOPHEN 650 MG: 325 TABLET ORAL at 11:02

## 2025-02-07 NOTE — TELEPHONE ENCOUNTER
Refill Decision Note   Hannah Lindsay  is requesting a refill authorization.  Brief Assessment and Rationale for Refill:  Approve     Medication Therapy Plan:         Extended chart review required: Yes   Comments:     Note composed:10:58 AM 02/07/2025

## 2025-02-07 NOTE — TELEPHONE ENCOUNTER
Unable to retrieve patient chart and identify care due.  [facility] Embedded Care Due Messages. Reference number: [id number].   2/07/2025 12:30:28 AM CST

## 2025-02-11 ENCOUNTER — CLINICAL SUPPORT (OUTPATIENT)
Dept: AUDIOLOGY | Facility: CLINIC | Age: 70
End: 2025-02-11
Payer: MEDICARE

## 2025-02-11 ENCOUNTER — OFFICE VISIT (OUTPATIENT)
Dept: OTOLARYNGOLOGY | Facility: CLINIC | Age: 70
End: 2025-02-11
Payer: MEDICARE

## 2025-02-11 VITALS
SYSTOLIC BLOOD PRESSURE: 160 MMHG | WEIGHT: 165.69 LBS | HEART RATE: 90 BPM | BODY MASS INDEX: 30.3 KG/M2 | DIASTOLIC BLOOD PRESSURE: 92 MMHG

## 2025-02-11 DIAGNOSIS — H61.23 BILATERAL IMPACTED CERUMEN: ICD-10-CM

## 2025-02-11 DIAGNOSIS — H90.3 SENSORINEURAL HEARING LOSS (SNHL), BILATERAL: Primary | ICD-10-CM

## 2025-02-11 DIAGNOSIS — H90.3 SENSORINEURAL HEARING LOSS (SNHL) OF BOTH EARS: Primary | ICD-10-CM

## 2025-02-11 NOTE — PROGRESS NOTES
Please click on link to view Audiogram:  Document on 2/11/2025 3:01 PM by Alexandra Bermeo AU.D: Audiogram    Ms. Hannah Montoya, a 69 y.o. female, was seen in the clinic today for an audiological evaluation following bilateral ear cleaning.    Tympanometry testing was attempted bilaterally; hermetic seal could not be maintained to obtain results.     Audiological testing revealed a mild to moderate high frequency sensorineural hearing loss (SNHL) bilaterally. A speech reception threshold was obtained at 40 dBHL for the right ear and at 35 dBHL for the left ear. Speech discrimination was 92% for the right ear and 100% for the left ear.      Recommendations:  1. Otologic evaluation  2. Annual audiological evaluation, sooner if medically necessary or if hearing changes  3. Hearing protection when in noise   4. Hearing aid consultation following medical clearance if Ms. Montoya feels hearing loss negatively impacts quality of life

## 2025-02-18 NOTE — PROGRESS NOTES
OTOLARYNGOLOGY CLINIC NOTE  Date:  02/11/2025     Chief complaint:  Chief Complaint   Patient presents with    Follow-up     Bilateral ear cleaning     History of Present Illness  Hannah Montoya is a 69 y.o. female  presenting today for a new evaluation and treatment of ear fullness and hearing loss.  Pt reports ears feeling like they need to be cleaned and want to access her current hearing.  Pt has been having decrease in hearing over several years.  Pt denies any otalgia, otorrhea, tinnitus or vertigo.      Review of medical records and prior documentation  Past medical records were reviewed with data pertinent to the chief complaint summarized in the HPI. Information obtained from review of medical records is attributed to respective sources in the HPI with reference to sources of information at their mention. Records reviewed included all recent notes from referring provider, primary care, and related subspecialty evaluations as available. This review of records was performed and additional data obtained to supplement history obtained from the patient and further inform medical decision making involved in formulating a plan of care accounting for all history and treatment relevant to the issues addressed.    Past Medical History  Past Medical History:   Diagnosis Date    AICD (automatic cardioverter/defibrillator) present     Asthma     bronchitis in past    Breast cancer 2016    right    Cardiac pacemaker     Cardiomyopathy     COPD (chronic obstructive pulmonary disease)     Diabetes mellitus, type 2     Hyperglycemia     Hyperlipidemia     Hypertension     Malignant neoplasm of overlapping sites of female breast 2/12/2016    Nuclear sclerosis of both eyes 8/12/2020    Respiratory distress 3/12/2020      Past Surgical History  Past Surgical History:   Procedure Laterality Date    BIOPSY, WITH CT GUIDANCE Right 01/24/2023    Procedure: LUNG MASS BIOPSY, WITH CT GUIDANCE;  Surgeon: Yehuda Green,  MD;  Location: [facility] CATH LAB;  Service: Radiology;  Laterality: Right;    BREAST BIOPSY Right 2016    IDC    BREAST LUMPECTOMY Right     BREAST SURGERY  2016    CARDIAC CATHETERIZATION Bilateral 2017    CARDIAC DEFIBRILLATOR PLACEMENT Left 08/10/2017    CARDIAC DEFIBRILLATOR PLACEMENT Left 2018     SECTION      x2    CHOLECYSTECTOMY      COLONOSCOPY N/A 10/03/2024    Procedure: COLONOSCOPY;  Surgeon: Wicho Serna MD;  Location: [facility] ENDO;  Service: Gastroenterology;  Laterality: N/A;    ESOPHAGOGASTRODUODENOSCOPY N/A 10/03/2024    Procedure: EGD (ESOPHAGOGASTRODUODENOSCOPY);  Surgeon: Wihco Serna MD;  Location: [facility] ENDO;  Service: Gastroenterology;  Laterality: N/A;    FEMUR BIOPSY Left 2024    Procedure: BIOPSY, FEMUR;  Surgeon: Porter Campos MD;  Location: [address];  Service: Orthopedics;  Laterality: Left;    FRACTURE SURGERY      INSERTION OF TUNNELED CENTRAL VENOUS CATHETER (CVC) WITH SUBCUTANEOUS PORT Right 2020    Procedure: [id number], RIGHT;  Surgeon: Josefa Caceres MD;  Location: [address];  Service: General;  Laterality: Right;  Port-a-cath placed to R. IJ    INTRAMEDULLARY RODDING OF FEMUR Left 2024    Procedure: INSERTION, INTRAMEDULLARY ANTOINE, FEMUR;  Surgeon: Porter Campos MD;  Location: [address];  Service: Orthopedics;  Laterality: Left;    LUNG BIOPSY N/A 2020    Procedure: BIOPSY, LUNG;  Surgeon: [facility] Diagnostic Provider;  Location: [facility];  Service: Radiology;  Laterality: N/A;  8AM START  RN PREOP 2020---COVID NEGATIVE    NAVIGATIONAL BRONCHOSCOPY N/A 10/13/2020    Procedure: BRONCHOSCOPY, NAVIGATIONAL;  Surgeon: Jamilah Weaver MD;  [address];  Service: Pulmonary;  Laterality: N/A;    REPLACEMENT OF IMPLANTABLE CARDIOVERTER-DEFIBRILLATOR (ICD) GENERATOR Left 2024    Procedure: REPLACEMENT, ICD GENERATOR;  Surgeon: Javy Gates MD;  Location: [facility]  LAB;  Service: Cardiology;  Laterality: Left;  LATOYA, CRT-D gen change, MDT, MAC, MB, 3 Prep    REVISION OF IMPLANTABLE CARDIOVERTER-DEFIBRILLATOR (ICD) ELECTRODE LEAD PLACEMENT N/A 06/15/2018    Procedure: REVISION-LEAD-ICD;  Surgeon: Javy Gates MD;  Location: [facility] CATH LAB;  Service: Cardiology;  Laterality: N/A;  LBBB, Bi-V ICD HIS Ld rev, JEAN MARIE, Wilda, MB, 3 Prep    ROBOT-ASSISTED LAPAROSCOPIC LYMPHADENECTOMY USING DA SALVADOR XI Right 10/23/2020    Procedure: XI ROBOTIC LYMPHADENECTOMY;  Surgeon: Ramo Tucker MD;  Location: [facility] OR [facility] FLR;  Service: Thoracic;  Laterality: Right;    TUBAL LIGATION        Medications  Medications Ordered Prior to Encounter[1]    Review of Systems  Review of Systems   Constitutional: Negative.    HENT:  Positive for hearing loss.    Eyes: Negative.    Respiratory: Negative.     Cardiovascular: Negative.    Gastrointestinal: Negative.    Genitourinary: Negative.    Musculoskeletal: Negative.    Skin: Negative.    Neurological: Negative.    Psychiatric/Behavioral: Negative.        Social History   reports that she quit smoking about 8 years ago. Her smoking use included cigarettes. She started smoking about 48 years ago. She has a 20 pack-year smoking history. She has been exposed to tobacco smoke. She has never used smokeless tobacco. She reports that she does not currently use alcohol after a past usage of about 1.0 standard drink of alcohol per week. She reports that she does not use drugs.     Family History  Family History   Problem Relation Name Age of Onset    Kidney disease Mother Hetal Rigo     Cataracts Mother Hetal Rigo     Arthritis Mother Hetal Rigo     Diabetes Mother Hetal Rigo     Kidney disease Father Betito Sierra     Cataracts Father Betito Rigo     Diabetes Father Betito Sierra     Hearing loss Father Betito Sierra     Heart disease Father Betito Sierra     Hypertension Father Betito Sierra     Stroke Father Betito Sierra     No Known  Problems Sister      Clotting disorder Brother      No Known Problems Maternal Aunt      No Known Problems Paternal Aunt      No Known Problems Maternal Uncle      No Known Problems Paternal Uncle      No Known Problems Maternal Grandfather      Macular degeneration Maternal Grandmother Alva Mae     Vision loss Maternal Grandmother Alva Mae     No Known Problems Paternal Grandfather      No Known Problems Paternal Grandmother      Anxiety disorder Daughter      Anxiety disorder Son      Diabetes Brother Ramin Sierra     Breast cancer Neg Hx      Ovarian cancer Neg Hx      Amblyopia Neg Hx      Blindness Neg Hx      Cancer Neg Hx      Glaucoma Neg Hx      Retinal detachment Neg Hx      Strabismus Neg Hx      Thyroid disease Neg Hx        Physical Exam   Vitals:    02/11/25 1324   BP: (!) 160/92   Pulse: 90    Body mass index is 30.3 kg/m².  Weight: 75.1 kg (165 lb 10.8 oz)       GENERAL: no acute distress.  HEAD: normocephalic.   EYES: lids and lashes normal. No scleral icterus  EARS: external ear without lesion, normal pinna shape and position.  External auditory canal with normal cerumen, tympanic membrane fully visible, no perforation , no retraction. No middle ear effusion. Ossicles intact.   NOSE: external nose without significant bony abnormality  ORAL CAVITY/OROPHARYNX: tongue midline and mobile. Symmetric palate rise.   NECK: trachea midline.   LYMPH NODES:No cervical lymphadenopathy.  RESPIRATORY: no stridor, no stertor. Voice normal. Respirations nonlabored.  NEURO: alert, responds to questions appropriately.   Cranial nerve exam as indicated in above sections and additionally showed facial movement symmetric with good eye closure and symmetric smile.   PSYCH:mood appropriate  Procedure Note   Procedure performed:microscopic examination of ears with cerumen disimpaction    Indication for procedure: bilateral cerumen impaction     Description of procedure:  After verbal consent was obtained,  the patient was positioned in semi recumbent position and speculum was placed in the right ear and microscope brought into the field.  The microscope was positioned and magnification adjusted for appropriate visualization. Otologic instruments including various size otologic suctions and curette were used to remove the cerumen from the right external auditory canals under visualization with the operating microscope. After cleaning, visualization was again performed and at various levels of higher magnification to optimize views of the ear canal, tympanic membrane, ossicles and middle ear space. The same procedure was then repeated for the left ear. Findings as indicated below. All portions of the procedure and examination by otomicroscopy were tolerated well without complication.     Findings:  Right ear: Complete cerumen impaction removed entirely revealing normal external auditory canal; tympanic membrane without bulging, retraction, or perforation; no evidence of middle ear fluid or effusion.   Left ear: Complete cerumen impaction removed entirely revealing normal external auditory canal; tympanic membrane without bulging, retraction, or perforation; no evidence of middle ear fluid or effusion.      Imaging:  The patient does not have any pertinent and/or recent imaging of the head and neck.     Labs:  CBC  Recent Labs   Lab 10/30/24  0956 11/06/24  1337 01/14/25  1230   WBC 4.74 5.36 6.76   Hemoglobin 9.6 L 10.4 L 10.9 L   Hematocrit 32.5 L 34.4 L 35.9 L   MCV 80 L 79 L 84   Platelets 156 151 267     BMP  Recent Labs   Lab 08/19/24  1904 08/20/24  0240 10/07/24  0627 10/20/24  0919 10/25/24  1007 10/30/24  0956 11/06/24  1337 01/14/25  1230   Glucose  --    < > 109 213 H   < > 261 H 227 H 111 H   Sodium  --    < > 137 140   < > 137 140 143   Potassium  --    < > 3.6 3.7   < > 4.0 3.8 4.1   Chloride  --    < > 116 H 101   < > 105 102 105   CO2  --    < > 16 L 26   < > 22 L 25 29   BUN  --    < > 12 18   < > 18 15  16   Creatinine  --    < > 0.8 1.0   < > 0.9 1.1 0.9   Calcium  --    < > 8.7 9.0   < > 8.8 10.1 10.1   Phosphorus  --    < > 2.2 L 1.9 L  --   --   --  3.0   Magnesium  --    < > 1.6 1.1 L  --   --   --  1.3 L   Prealbumin 20  --   --   --   --   --   --   --     < > = values in this interval not displayed.     COAGS  Recent Labs   Lab 08/19/24  0446 08/19/24  1904 10/25/24  1007   INR 1.0 1.0 1.0       AUDIOLOGY RESULTS  Audiometric evaluation including audiogram, tympanometry, acoustic reflexes, and speech discrimination which was performed  was personally reviewed and interpreted.  Notable findings on the audiogram were mild to moderate high frequency sensorineural hearing loss (SNHL) bilaterally.  Tympanometry not performed bilaterally.  Speech discrimination was 100% on the left, and 92% on the right.   Report of the audiologist performing this audiometric testing was also reviewed.     Assessment  1. Sensorineural hearing loss (SNHL), bilateral     Plan:  SNHL:  Audiogram reviewed and discussed with patient. Recheck hearing in 1 year or sooner if subjective change noted. Encouraged hearing protection while in noisy environment.  Additionally, amplification with hearing aids is generally the best option for hearing rehabilitation, except where the hearing loss is profound.  Discussed plan of care with patient in detail and all questions answered. Patient reported understanding of plan of care.        [1]   Current Outpatient Medications on File Prior to Visit   Medication Sig Dispense Refill    ACCU-CHEK ALFRED PLUS TEST STRP Strp USE TO TEST THREE TIMES DAILY 200 strip 3    acetaminophen (TYLENOL) 500 MG tablet Take 2 tablets (1,000 mg total) by mouth every 8 (eight) hours as needed for Pain.      albuterol (ACCUNEB) 1.25 mg/3 mL Nebu use 1 AMPULE (3ml) via NEBULIZER EVERY 6 HOURS AS NEEDED 300 mL 3    albuterol (VENTOLIN HFA) 90 mcg/actuation inhaler Inhale 2 puffs into the lungs every 4 (four) hours as  "needed for Wheezing or Shortness of Breath. Rescue 18 g 4    amLODIPine (NORVASC) 5 MG tablet TAKE 1 TABLET BY MOUTH EVERY DAY 90 tablet 2    apixaban (ELIQUIS DVT-PE TREAT 30D START) 5 mg (74 tabs) DsPk For the first 7 days take two 5 mg tablets twice daily.  After 7 days take one 5 mg tablet twice daily. 74 tablet 0    atorvastatin (LIPITOR) 10 MG tablet TAKE 1 TABLET BY MOUTH EVERY DAY 90 tablet 1    BD ULTRA-FINE GIN PEN NEEDLE 32 gauge x 5/32" Ndle For once daily insulin injections 50 each 3    benzonatate (TESSALON) 200 MG capsule Take 1 capsule (200 mg total) by mouth 3 (three) times daily as needed for Cough. 30 capsule 1    buPROPion (WELLBUTRIN XL) 150 MG TB24 tablet TAKE 1 TABLET BY MOUTH EVERY DAY 90 tablet 3    cetirizine (ZYRTEC) 10 MG tablet Take 10 mg by mouth every evening.       cholecalciferol, vitamin D3, (VITAMIN D3) 50 mcg (2,000 unit) Tab Take 1 tablet (2,000 Units total) by mouth once daily. 30 tablet 11    insulin glargine U-100, Lantus, (LANTUS SOLOSTAR U-100 INSULIN) 100 unit/mL (3 mL) InPn pen Inject 12 Units into the skin every evening. 15 mL 1    losartan (COZAAR) 100 MG tablet TAKE 1 TABLET BY MOUTH EVERY DAY 90 tablet 1    metFORMIN (GLUCOPHAGE) 500 MG tablet Take 1 tablet (500 mg total) by mouth 2 (two) times daily with meals. Needs appointment for future refills 180 tablet 0    metoprolol succinate (TOPROL-XL) 100 MG 24 hr tablet TAKE 1 TABLET BY MOUTH EVERY DAY 90 tablet 1    multivitamin (THERAGRAN) per tablet Take 1 tablet by mouth once daily.      ondansetron (ZOFRAN-ODT) 8 MG TbDL Take 1 tablet (8 mg total) by mouth every 8 (eight) hours as needed (nausea/vomiting). Take 1 tablet (8 mg) by mouth every 8 hours as needed for nausea/vomiting. 60 tablet 5    pantoprazole (PROTONIX) 40 MG tablet TAKE 1 TABLET BY MOUTH EVERY DAY 90 tablet 3    sertraline (ZOLOFT) 100 MG tablet TAKE 1 TABLET BY MOUTH EVERY DAY IN THE EVENING 90 tablet 3    tiotropium (SPIRIVA WITH HANDIHALER) 18 mcg " inhalation capsule INHALE THE CONTENTS OF 1 CAPSULE BY MOUTH EVERY DAY 90 capsule 3    WIXELA INHUB 250-50 mcg/dose diskus inhaler INHALE 1 PUFF INTO THE LUNGS 2 (TWO) TIMES DAILY. CONTROLLER 180 each 3     Current Facility-Administered Medications on File Prior to Visit   Medication Dose Route Frequency Provider Last Rate Last Admin    fentaNYL injection 25 mcg  25 mcg Intravenous Q5 Min PRN Keesha Martins MD        haloperidol lactate injection 0.5 mg  0.5 mg Intravenous Once PRN Keesha Martins MD        HYDROmorphone injection 0.2 mg  0.2 mg Intravenous Q5 Min PRN Keesha Martins MD        ondansetron injection 4 mg  4 mg Intravenous Once PRN Keesha Martins MD        sodium chloride 0.9% flush 10 mL  10 mL Intravenous PRN Keesha Martins MD

## 2025-02-21 ENCOUNTER — LAB VISIT (OUTPATIENT)
Dept: LAB | Facility: HOSPITAL | Age: 70
End: 2025-02-21
Attending: INTERNAL MEDICINE
Payer: MEDICARE

## 2025-02-21 ENCOUNTER — INFUSION (OUTPATIENT)
Dept: INFUSION THERAPY | Facility: HOSPITAL | Age: 70
End: 2025-02-21
Payer: MEDICARE

## 2025-02-21 ENCOUNTER — OFFICE VISIT (OUTPATIENT)
Dept: HEMATOLOGY/ONCOLOGY | Facility: CLINIC | Age: 70
End: 2025-02-21
Payer: MEDICARE

## 2025-02-21 VITALS
TEMPERATURE: 98 F | DIASTOLIC BLOOD PRESSURE: 63 MMHG | BODY MASS INDEX: 30.91 KG/M2 | SYSTOLIC BLOOD PRESSURE: 142 MMHG | HEIGHT: 62 IN | HEART RATE: 85 BPM | OXYGEN SATURATION: 97 % | WEIGHT: 168 LBS | RESPIRATION RATE: 18 BRPM

## 2025-02-21 VITALS
DIASTOLIC BLOOD PRESSURE: 75 MMHG | SYSTOLIC BLOOD PRESSURE: 121 MMHG | BODY MASS INDEX: 30.91 KG/M2 | TEMPERATURE: 98 F | HEART RATE: 94 BPM | OXYGEN SATURATION: 97 % | WEIGHT: 168 LBS | HEIGHT: 62 IN

## 2025-02-21 DIAGNOSIS — C34.91 NSCLC OF RIGHT LUNG: Primary | ICD-10-CM

## 2025-02-21 DIAGNOSIS — C34.91 NSCLC OF RIGHT LUNG: ICD-10-CM

## 2025-02-21 DIAGNOSIS — E83.52 MALIGNANCY ASSOCIATED HYPERCALCEMIA: ICD-10-CM

## 2025-02-21 DIAGNOSIS — I44.7 LEFT BUNDLE-BRANCH BLOCK: ICD-10-CM

## 2025-02-21 DIAGNOSIS — M89.9 LYTIC BONE LESION OF LEFT FEMUR: ICD-10-CM

## 2025-02-21 DIAGNOSIS — I42.0 DILATED CARDIOMYOPATHY: ICD-10-CM

## 2025-02-21 DIAGNOSIS — C79.51 MALIGNANT NEOPLASM METASTATIC TO BONE: ICD-10-CM

## 2025-02-21 DIAGNOSIS — Z85.3 HISTORY OF BREAST CANCER IN FEMALE: ICD-10-CM

## 2025-02-21 DIAGNOSIS — J70.0 RADIATION PNEUMONITIS: ICD-10-CM

## 2025-02-21 DIAGNOSIS — M25.552 LEFT HIP PAIN: ICD-10-CM

## 2025-02-21 DIAGNOSIS — K52.9 COLITIS: ICD-10-CM

## 2025-02-21 DIAGNOSIS — K52.9 COLITIS: Primary | ICD-10-CM

## 2025-02-21 DIAGNOSIS — Z95.810 CARDIAC RESYNCHRONIZATION THERAPY DEFIBRILLATOR (CRT-D) IN PLACE: ICD-10-CM

## 2025-02-21 DIAGNOSIS — C77.1 SECONDARY AND UNSPECIFIED MALIGNANT NEOPLASM OF INTRATHORACIC LYMPH NODES: ICD-10-CM

## 2025-02-21 LAB
ALBUMIN SERPL BCP-MCNC: 3.5 G/DL (ref 3.5–5.2)
ALP SERPL-CCNC: 78 U/L (ref 40–150)
ALT SERPL W/O P-5'-P-CCNC: 14 U/L (ref 10–44)
ANION GAP SERPL CALC-SCNC: 12 MMOL/L (ref 8–16)
AST SERPL-CCNC: 23 U/L (ref 10–40)
BILIRUB SERPL-MCNC: 0.3 MG/DL (ref 0.1–1)
BUN SERPL-MCNC: 20 MG/DL (ref 8–23)
CALCIUM SERPL-MCNC: 9.9 MG/DL (ref 8.7–10.5)
CHLORIDE SERPL-SCNC: 106 MMOL/L (ref 95–110)
CO2 SERPL-SCNC: 22 MMOL/L (ref 23–29)
CREAT SERPL-MCNC: 0.9 MG/DL (ref 0.5–1.4)
ERYTHROCYTE [DISTWIDTH] IN BLOOD BY AUTOMATED COUNT: 15.8 % (ref 11.5–14.5)
EST. GFR  (NO RACE VARIABLE): >60 ML/MIN/1.73 M^2
GLUCOSE SERPL-MCNC: 118 MG/DL (ref 70–110)
HCT VFR BLD AUTO: 37.2 % (ref 37–48.5)
HGB BLD-MCNC: 11 G/DL (ref 12–16)
IMM GRANULOCYTES # BLD AUTO: 0.07 K/UL (ref 0–0.04)
MCH RBC QN AUTO: 24.4 PG (ref 27–31)
MCHC RBC AUTO-ENTMCNC: 29.6 G/DL (ref 32–36)
MCV RBC AUTO: 83 FL (ref 82–98)
NEUTROPHILS # BLD AUTO: 4.6 K/UL (ref 1.8–7.7)
PLATELET # BLD AUTO: 251 K/UL (ref 150–450)
PMV BLD AUTO: 12.2 FL (ref 9.2–12.9)
POTASSIUM SERPL-SCNC: 4.5 MMOL/L (ref 3.5–5.1)
PROT SERPL-MCNC: 7.4 G/DL (ref 6–8.4)
RBC # BLD AUTO: 4.51 M/UL (ref 4–5.4)
SODIUM SERPL-SCNC: 140 MMOL/L (ref 136–145)
WBC # BLD AUTO: 6.84 K/UL (ref 3.9–12.7)

## 2025-02-21 PROCEDURE — 25000003 PHARM REV CODE 250: Performed by: INTERNAL MEDICINE

## 2025-02-21 PROCEDURE — 36415 COLL VENOUS BLD VENIPUNCTURE: CPT | Performed by: INTERNAL MEDICINE

## 2025-02-21 PROCEDURE — 96365 THER/PROPH/DIAG IV INF INIT: CPT

## 2025-02-21 PROCEDURE — 80053 COMPREHEN METABOLIC PANEL: CPT | Performed by: INTERNAL MEDICINE

## 2025-02-21 PROCEDURE — 85027 COMPLETE CBC AUTOMATED: CPT | Performed by: INTERNAL MEDICINE

## 2025-02-21 RX ORDER — SODIUM CHLORIDE 0.9 % (FLUSH) 0.9 %
10 SYRINGE (ML) INJECTION
OUTPATIENT
Start: 2025-03-29

## 2025-02-21 RX ORDER — METHYLPREDNISOLONE SOD SUCC 125 MG
40 VIAL (EA) INJECTION
OUTPATIENT
Start: 2025-03-29

## 2025-02-21 RX ORDER — HEPARIN 100 UNIT/ML
500 SYRINGE INTRAVENOUS
OUTPATIENT
Start: 2025-03-29

## 2025-02-21 RX ORDER — IPRATROPIUM BROMIDE AND ALBUTEROL SULFATE 2.5; .5 MG/3ML; MG/3ML
3 SOLUTION RESPIRATORY (INHALATION)
OUTPATIENT
Start: 2025-03-29

## 2025-02-21 RX ORDER — ACETAMINOPHEN 325 MG/1
650 TABLET ORAL
OUTPATIENT
Start: 2025-03-29

## 2025-02-21 RX ORDER — EPINEPHRINE 1 MG/ML
0.3 INJECTION, SOLUTION, CONCENTRATE INTRAVENOUS
OUTPATIENT
Start: 2025-03-29

## 2025-02-21 RX ORDER — SODIUM CHLORIDE 0.9 % (FLUSH) 0.9 %
10 SYRINGE (ML) INJECTION
Status: DISCONTINUED | OUTPATIENT
Start: 2025-02-21 | End: 2025-02-21 | Stop reason: HOSPADM

## 2025-02-21 RX ORDER — ACETAMINOPHEN 325 MG/1
650 TABLET ORAL ONCE
Status: COMPLETED | OUTPATIENT
Start: 2025-02-21 | End: 2025-02-21

## 2025-02-21 RX ORDER — HEPARIN 100 UNIT/ML
500 SYRINGE INTRAVENOUS
Status: DISCONTINUED | OUTPATIENT
Start: 2025-02-21 | End: 2025-02-21 | Stop reason: HOSPADM

## 2025-02-21 RX ORDER — CETIRIZINE HYDROCHLORIDE 10 MG/1
10 TABLET ORAL ONCE
OUTPATIENT
Start: 2025-03-29 | End: 2025-03-29

## 2025-02-21 RX ORDER — ACETAMINOPHEN 325 MG/1
650 TABLET ORAL ONCE
OUTPATIENT
Start: 2025-03-29 | End: 2025-03-29

## 2025-02-21 RX ORDER — CETIRIZINE HYDROCHLORIDE 10 MG/1
10 TABLET ORAL ONCE
Status: COMPLETED | OUTPATIENT
Start: 2025-02-21 | End: 2025-02-21

## 2025-02-21 RX ADMIN — ACETAMINOPHEN 650 MG: 325 TABLET ORAL at 02:02

## 2025-02-21 RX ADMIN — SODIUM CHLORIDE: 0.9 INJECTION, SOLUTION INTRAVENOUS at 02:02

## 2025-02-21 RX ADMIN — SODIUM CHLORIDE 300 MG: 9 INJECTION, SOLUTION INTRAVENOUS at 02:02

## 2025-02-21 RX ADMIN — CETIRIZINE HYDROCHLORIDE 10 MG: 10 TABLET, FILM COATED ORAL at 02:02

## 2025-02-21 NOTE — PROGRESS NOTES
ONCOLOGY FOLLOW UP VISIT.       Cancer/Stage/TNM:    Cancer Staging   NSCLC of right lung  Staging form: Lung, AJCC 8th Edition  - Clinical stage from 9/29/2020: Stage IIIA (cT3, cN1, cM0) - Signed by Ramo Tucker MD on 10/24/2020  - Clinical stage from 7/31/2024: Stage DEREK (cT3, cN2, cM1a) - Signed by Javi Avina MD on 8/2/2024         Oncology History   NSCLC of right lung   9/29/2020 Cancer Staged    Staging form: Lung, AJCC 8th Edition  - Clinical stage from 9/29/2020: Stage IIIA (cT3, cN1, cM0)     10/14/2020 Initial Diagnosis    NSCLC of right lung     11/17/2020 - 12/30/2020 Radiation Therapy    Treating physician: Harvinder Lara     Site  Technique  Energy  Dose/Fx (Gy)  #Fx  Total Dose (Gy)    RLL Lung  VMAT  6X  2  30 / 30  60         2/6/2023 -  Chemotherapy    Treatment Summary   Plan Name: OP NSCLC NIVOLUMAB 360 MG D1 & D22 WITH IPILIMUMAB 1 MG/KG Q6W PLUS CARBOPLATIN (AUC) PACLITAXEL Q3W X2 DOSES  Treatment Goal: Control  Status: Active  Start Date: 2/6/2023  End Date: 2/19/2026 (Planned)  Provider: Javi Avina MD  Chemotherapy: CARBOplatin (PARAPLATIN) 525 mg in sodium chloride 0.9% 337.5 mL chemo infusion, 525 mg, Intravenous, Clinic/HOD 1 time, 1 of 1 cycle  Administration: 525 mg (2/6/2023), 490 mg (2/27/2023)  PACLitaxeL (TAXOL) 200 mg/m2 = 396 mg in sodium chloride 0.9% 500 mL chemo infusion, 200 mg/m2 = 396 mg (100 % of original dose 200 mg/m2), Intravenous, Clinic/HOD 1 time, 1 of 1 cycle  Dose modification: 200 mg/m2 (original dose 200 mg/m2, Cycle 1), 200 mg/m2 (original dose 200 mg/m2, Cycle 1)  Administration: 396 mg (2/6/2023), 396 mg (2/27/2023)     6/12/2023 - 6/30/2023 Radiation Therapy    Treating physician: Harvinder Vásquez Technique Energy Dose/Fx (Gy) #Fx Total Dose (Gy)   RLL Lung RtLung 6X 4 15 / 15 60        7/31/2024 Cancer Staged    Staging form: Lung, AJCC 8th Edition  - Clinical stage from 7/31/2024: Stage DEREK (cT3, cN2, cM1a)          Interval  History:   Patient reports initial improvement in stools following 1st Entyvio infusion. Was having 1 BM per day which has increased to 3 loose stools per day for the last week. Denies nausea. Migraine headache the other day. Took Tylenol and went away for an hour but came back later in the day. Resolved by the next day. No further headaches since.       ROS:  Review of Systems   Constitutional:  Negative for activity change, appetite change, chills, fatigue, fever and unexpected weight change.   HENT:  Negative for ear pain, facial swelling, hearing loss, mouth sores, nosebleeds, sore throat and trouble swallowing.    Eyes:  Negative for pain, discharge, redness and visual disturbance.   Respiratory:  Negative for cough, chest tightness and shortness of breath.    Cardiovascular:  Negative for chest pain, palpitations and leg swelling.   Gastrointestinal:  Positive for diarrhea (loose). Negative for abdominal distention, abdominal pain, blood in stool, constipation, nausea and vomiting.   Endocrine: Negative for cold intolerance and heat intolerance.   Genitourinary:  Negative for decreased urine volume, difficulty urinating, dysuria, frequency, hematuria, hot flashes, urgency and vaginal bleeding.   Musculoskeletal:  Negative for arthralgias, gait problem, leg pain and myalgias.   Integumentary:  Negative for pallor, rash and wound.   Allergic/Immunologic: Negative for immunocompromised state.   Neurological:  Negative for dizziness, tremors, weakness, light-headedness, numbness and headaches.   Hematological:  Negative for adenopathy. Does not bruise/bleed easily.   Psychiatric/Behavioral:  Negative for agitation, confusion, dysphoric mood and sleep disturbance. The patient is not nervous/anxious.             A complete 12-point review of systems was reviewed and is negative except as mentioned above.     Past Medical History:   Past Medical History:   Diagnosis Date    AICD (automatic cardioverter/defibrillator)  "present     Asthma     bronchitis in past    Breast cancer 2016    right    Cardiac pacemaker     Cardiomyopathy     COPD (chronic obstructive pulmonary disease)     Diabetes mellitus, type 2     Hyperglycemia     Hyperlipidemia     Hypertension     Malignant neoplasm of overlapping sites of female breast 2/12/2016    Nuclear sclerosis of both eyes 8/12/2020    Respiratory distress 3/12/2020        Allergies:   Review of patient's allergies indicates:   Allergen Reactions    Taxol [paclitaxel]      Hypersensitivity reaction to taxol, symptoms included shortness of breath, nausea, dizziness, flushing     Carboplatin Other (See Comments)     Itching and hives    Adhesive Rash     tegaderm burns and blistered skin        Medications:   Current Outpatient Medications   Medication Sig Dispense Refill    ACCU-CHEK ALFRED PLUS TEST STRP Strp USE TO TEST THREE TIMES DAILY 200 strip 3    acetaminophen (TYLENOL) 500 MG tablet Take 2 tablets (1,000 mg total) by mouth every 8 (eight) hours as needed for Pain.      albuterol (ACCUNEB) 1.25 mg/3 mL Nebu use 1 AMPULE (3ml) via NEBULIZER EVERY 6 HOURS AS NEEDED 300 mL 3    albuterol (VENTOLIN HFA) 90 mcg/actuation inhaler Inhale 2 puffs into the lungs every 4 (four) hours as needed for Wheezing or Shortness of Breath. Rescue 18 g 4    amLODIPine (NORVASC) 5 MG tablet TAKE 1 TABLET BY MOUTH EVERY DAY 90 tablet 2    atorvastatin (LIPITOR) 10 MG tablet TAKE 1 TABLET BY MOUTH EVERY DAY 90 tablet 1    BD ULTRA-FINE GIN PEN NEEDLE 32 gauge x 5/32" Ndle For once daily insulin injections 50 each 3    benzonatate (TESSALON) 200 MG capsule Take 1 capsule (200 mg total) by mouth 3 (three) times daily as needed for Cough. 30 capsule 1    buPROPion (WELLBUTRIN XL) 150 MG TB24 tablet TAKE 1 TABLET BY MOUTH EVERY DAY 90 tablet 3    cetirizine (ZYRTEC) 10 MG tablet Take 10 mg by mouth every evening.       cholecalciferol, vitamin D3, (VITAMIN D3) 50 mcg (2,000 unit) Tab Take 1 tablet (2,000 Units " total) by mouth once daily. 30 tablet 11    insulin glargine U-100, Lantus, (LANTUS SOLOSTAR U-100 INSULIN) 100 unit/mL (3 mL) InPn pen Inject 12 Units into the skin every evening. 15 mL 1    losartan (COZAAR) 100 MG tablet TAKE 1 TABLET BY MOUTH EVERY DAY 90 tablet 1    metFORMIN (GLUCOPHAGE) 500 MG tablet Take 1 tablet (500 mg total) by mouth 2 (two) times daily with meals. Needs appointment for future refills 180 tablet 0    metoprolol succinate (TOPROL-XL) 100 MG 24 hr tablet TAKE 1 TABLET BY MOUTH EVERY DAY 90 tablet 1    multivitamin (THERAGRAN) per tablet Take 1 tablet by mouth once daily.      ondansetron (ZOFRAN-ODT) 8 MG TbDL Take 1 tablet (8 mg total) by mouth every 8 (eight) hours as needed (nausea/vomiting). Take 1 tablet (8 mg) by mouth every 8 hours as needed for nausea/vomiting. 60 tablet 5    pantoprazole (PROTONIX) 40 MG tablet TAKE 1 TABLET BY MOUTH EVERY DAY 90 tablet 3    sertraline (ZOLOFT) 100 MG tablet TAKE 1 TABLET BY MOUTH EVERY DAY IN THE EVENING 90 tablet 3    tiotropium (SPIRIVA WITH HANDIHALER) 18 mcg inhalation capsule INHALE THE CONTENTS OF 1 CAPSULE BY MOUTH EVERY DAY 90 capsule 3    WIXELA INHUB 250-50 mcg/dose diskus inhaler INHALE 1 PUFF INTO THE LUNGS 2 (TWO) TIMES DAILY. CONTROLLER 180 each 3    apixaban (ELIQUIS DVT-PE TREAT 30D START) 5 mg (74 tabs) DsPk For the first 7 days take two 5 mg tablets twice daily.  After 7 days take one 5 mg tablet twice daily. 74 tablet 0     No current facility-administered medications for this visit.     Facility-Administered Medications Ordered in Other Visits   Medication Dose Route Frequency Provider Last Rate Last Admin    0.9% NaCl 250 mL flush bag   Intravenous PRN Javi Avina MD        alteplase injection 2 mg  2 mg Intra-Catheter PRN Javi Avina MD        fentaNYL injection 25 mcg  25 mcg Intravenous Q5 Min PRN Keesha Martins MD        haloperidol lactate injection 0.5 mg  0.5 mg Intravenous Once PRN Keesha Martins MD         "heparin, porcine (PF) 100 unit/mL injection flush 500 Units  500 Units Intravenous PRN Javi Avina MD        HYDROmorphone injection 0.2 mg  0.2 mg Intravenous Q5 Min PRN Keesha Martins MD        ondansetron injection 4 mg  4 mg Intravenous Once PRN Keesha Martins MD        sodium chloride 0.9% flush 10 mL  10 mL Intravenous PRN Keesha Martins MD        sodium chloride 0.9% flush 10 mL  10 mL Intravenous PRN Javi Avina MD        vedolizumab (ENTYVIO) 300 mg/250 mL NS IVPB  300 mg Intravenous 1 time in Clinic/HOD Javi Avina  mL/hr at 02/21/25 1447 300 mg at 02/21/25 1447        Physical Exam:   /75 (Patient Position: Sitting)   Pulse 94   Temp 98.1 °F (36.7 °C) (Oral)   Ht 5' 2" (1.575 m)   Wt 76.2 kg (167 lb 15.9 oz)   LMP  (LMP Unknown)   SpO2 97%   BMI 30.73 kg/m²      ECOG Performance Status: (foot note - ECOG PS provided by Eastern Cooperative Oncology Group) 0 - Asymptomatic    Physical Exam  Vitals and nursing note reviewed.   Constitutional:       Appearance: Normal appearance. She is well-developed and normal weight.   HENT:      Head: Normocephalic and atraumatic.   Eyes:      Conjunctiva/sclera: Conjunctivae normal.      Pupils: Pupils are equal, round, and reactive to light.   Pulmonary:      Effort: Pulmonary effort is normal. No respiratory distress.   Abdominal:      General: There is no distension.      Palpations: Abdomen is soft.   Musculoskeletal:         General: No swelling. Normal range of motion.      Cervical back: Normal range of motion and neck supple.      Left ankle: Swelling (trace) present.   Lymphadenopathy:      Cervical: No cervical adenopathy.   Skin:     General: Skin is warm and dry.      Findings: No rash.   Neurological:      General: No focal deficit present.      Mental Status: She is alert and oriented to person, place, and time.   Psychiatric:         Mood and Affect: Mood and affect normal.         Behavior: Behavior normal.             "     Labs:   Recent Results (from the past 48 hours)   CBC Oncology    Collection Time: 02/21/25 12:28 PM   Result Value Ref Range    WBC 6.84 3.90 - 12.70 K/uL    RBC 4.51 4.00 - 5.40 M/uL    Hemoglobin 11.0 (L) 12.0 - 16.0 g/dL    Hematocrit 37.2 37.0 - 48.5 %    MCV 83 82 - 98 fL    MCH 24.4 (L) 27.0 - 31.0 pg    MCHC 29.6 (L) 32.0 - 36.0 g/dL    RDW 15.8 (H) 11.5 - 14.5 %    Platelets 251 150 - 450 K/uL    MPV 12.2 9.2 - 12.9 fL    Gran # (ANC) 4.6 1.8 - 7.7 K/uL    Immature Grans (Abs) 0.07 (H) 0.00 - 0.04 K/uL   Comprehensive Metabolic Panel    Collection Time: 02/21/25 12:28 PM   Result Value Ref Range    Sodium 140 136 - 145 mmol/L    Potassium 4.5 3.5 - 5.1 mmol/L    Chloride 106 95 - 110 mmol/L    CO2 22 (L) 23 - 29 mmol/L    Glucose 118 (H) 70 - 110 mg/dL    BUN 20 8 - 23 mg/dL    Creatinine 0.9 0.5 - 1.4 mg/dL    Calcium 9.9 8.7 - 10.5 mg/dL    Total Protein 7.4 6.0 - 8.4 g/dL    Albumin 3.5 3.5 - 5.2 g/dL    Total Bilirubin 0.3 0.1 - 1.0 mg/dL    Alkaline Phosphatase 78 40 - 150 U/L    AST 23 10 - 40 U/L    ALT 14 10 - 44 U/L    eGFR >60.0 >60 mL/min/1.73 m^2    Anion Gap 12 8 - 16 mmol/L                  Imaging:   CT Chest Abdomen Pelvis With IV Contrast (XPD) NO Oral Contrast  Narrative: EXAMINATION:  CT CHEST ABDOMEN PELVIS WITH IV CONTRAST (XPD)    CLINICAL HISTORY:  Non-small cell lung cancer (NSCLC), metastatic, assess treatment response;Malignant neoplasm of unspecified part of right bronchus or lung    TECHNIQUE:  Low dose axial images, sagittal and coronal reformations were obtained from the thoracic inlet to the pubic symphysis after IV administration of 75 cc of Omnipaque.    COMPARISON:  09/18/2024    FINDINGS:  Chest:    Heart and mediastinal vasculature: Left-sided pacemaker.  Heart mildly enlarged.  Aortic atherosclerosis.    Larissa/Mediastinum: Precarinal and subcarinal lymph nodes are decreased in size significantly.  Right hilar adenopathy/mass also decreased in size.   Para-aortic/posterior mediastinal adenopathy has essentially resolved.    Lungs: Volume loss in the right hemithorax and right basilar lung consolidation without air bronchograms appears similar to the prior study with a small loculated right-sided pleural effusion.  Parenchymal scarring in the aerated right lung.  Spiculated nodule in the left lung base is stable.  Extra cardiac pacemaker wires in the left pleural space anteriorly.  There is adjacent lung scarring.    Abdomen and pelvis:    Liver: Normal in size.  Hypoattenuation of the hepatic parenchyma adjacent to the gallbladder fundus and adjacent to the gallbladder body are more conspicuous than on the prior exam is.    Gallbladder: Absent    Bile Ducts: No evidence of dilated ducts.    Pancreas: No mass or peripancreatic fat stranding.    Spleen: Unremarkable.    Adrenals: Generalized left adrenal gland thickening appears similar to prior.  Right adrenal gland unremarkable.    Kidneys/ Ureters: Normal in size and location. No hydronephrosis, solid mass, or nephrolithiasis.  Fetal lobulation versus parenchymal scarring bilaterally.    GI Tract/Mesentery: There is some mucosal hyperenhancement and wall thickening of the rectum and distal sigmoid colon.  Large bowel otherwise unremarkable and small bowel loops have a benign appearance.    Peritoneal Space: No ascites. No free air.    Retroperitoneum: No significant adenopathy.    Vasculature: Aortic atherosclerosis is present.    Bladder: No evidence of wall thickening.    Reproductive organs: Unremarkable.    Bones: No suspicious lytic or blastic lesions.  Impression: In this patient with provided history of non-small cell lung cancer there is stable atelectasis/collapse of the right lower lobe.  No discrete mass able to be measured.    Precarinal, right hilar, and posterior mediastinal adenopathy has improved.    Stable spiculated nodule in the left lower lobe for which attention on follow-up is  suggested.    Increasing conspicuity of pericholecystic hypoattenuation within the liver.  This may represent areas of focal fatty infiltration but as it is a new finding, metastatic disease is a consideration.  Further evaluation with MRI with and without contrast recommended.    Rectosigmoid wall enhancement and thickening which could be related to proctocolitis.  Correlate clinically.    RECIST SUMMARY:    Date of prior examination for comparison: 09/18/2024    Lesion 1: Precarinal lymph node.  1.3 cm. Series 2 image 50.  Prior measurement 1.7 cm.    Lesion 2: Right hilar lymph node.  0 cm. Series 2 image 57.  Prior measurement 1.7 cm.    Lesion 3: Posterior mediastinal lymph node.  0 cm. Series 2 image 80.  Prior measurement 1.2 cm.    Lesion 4: Left lower lobe.  0.9 cm. Series 6 image 372.  Prior measurement 0.9 cm.    Electronically signed by: Serafin Wallace Jr  Date:    01/14/2025  Time:    14:23            Diagnoses:       1. NSCLC of right lung    2. Secondary and unspecified malignant neoplasm of intrathoracic lymph nodes    3. Lytic bone lesion of left femur s/p IM nail on 8/20/2024    4. Left hip pain    5. Colitis    6. Radiation pneumonitis    7. History of breast cancer in female    8. Dilated cardiomyopathy    9. Left bundle-branch block    10. Cardiac resynchronization therapy defibrillator (CRT-D) in place    11. Malignancy associated hypercalcemia    12. Malignant neoplasm metastatic to bone        Assessment and Plan:         1,2. Recurrent NSCLC  Plan is for definitive chemoRT, will need re-staging scans prior.  Reviewed with patient in detail her diagnosis, stage, treatment options, and prognosis (3 year OS 66% vs. 43.5% without durvalumab) with standard of care chemoRT followed by maintenance immunotherapy.  Patient has consented for [id number] clinical trial, a randomized control trial evaluating combination chemoRT + durvalumab followed by maintenance vs. SOC chemoRT -> maintenance.  CT scans  7/2021 with CR.  - patient randomized on [id number] to SOC arm (Imfinzi) - completed 12/2021.    - CT scan 12/2022 with progressing pulmonary nodule in LLL, still with stable disease in RLL consolidation. PET scan also showing enlarging right lower lobe mass with increased hypermetabolic activity concerning for recurrence. Will present her case at the next thoracic tumor board to discuss biopsy and possible treatment options.   - Biopsy of lung mass consistent with SCC. Initiated on 9LA. Initial re-staging scans with stable disease.   - Patient has completed RT 4-5 weeks ago and has no symptoms. She has some mild inflammation on Chest CT, but since asymptomatic and completed RT will re-initiate IO.  - Now s/p 3 cycles of 9LA. With persistent IO induced rash and continued pneumonitis on recent imaging, plan to hold IO and proceed with surveillance.  - 10/16/23 CT showing continued evidence of inflammation/infection. Currently has cold symptoms. +Covid.   - Repeat CT scan (preliminary reading) shows improvement in inflammation but indeterminate possible new liver metastases. Will confirm with PETCT.  - CT CAP is showing progression of disease in L hilum, hilar LN, mediastinal LN, and pleural effusion.   - Plan is to restart ipi + nivo. Not eligible for any trials.   - Holding Ipi Nivo for colitis. Will re-challenge after giving loading doses of Entyvio. Imaging reviewed today and shows response to one dose of Ipi/Nivo.     3,4. Recovering from left femur nailing. Has oxy IR for pain. PT/OT ordered. Will get bone scans with re-staging to evaluate for metastatic bone disease.    5. Grade 4 needing hospitalization. Required multiple days of steroids prior to resolution of symptoms.   - Due for dose #2 today. Patient to report worsening of diarrhea.     5. Resolved. Discussed symptoms to report with re-challenging immunotherapy.     6.  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      7-9.  Stable,  follows with cardiology. AICD exchanged 11/2024.     9,10. Hypercalcemia resolved. Zometa awaiting Dental clearance. Has not seen a dentist in 4 years.    Patient is in agreement with the proposed treatment plan. All questions were answered to the patient's satisfaction. Pt knows to call clinic if anything is needed before the next clinic visit.    Aide Dumont, MSN, APRN, FNP-C  Hematology and Medical Oncology  Nurse Practitioner to Dr. Javi Avina  Nurse Practitioner, [facility]         Route Chart for Scheduling    Med Onc Chart Routing      Follow up with physician Other. RTC in 8 weeks to review imaging and possible Opdivo   Follow up with DANIEL 4 weeks. prior to Entyvio   Infusion scheduling note   Needs Entyvio infusion on May 16th   Injection scheduling note    Labs CBC, CMP, free T4 and TSH   Scheduling:  Preferred lab:  Lab interval:     Imaging CT chest abdomen pelvis   in 8 weeks   Pharmacy appointment    Other referrals                  Treatment Plan Information   OP NSCLC NIVOLUMAB 360 MG D1 & D22 WITH IPILIMUMAB 1 MG/KG Q6W PLUS CARBOPLATIN (AUC) PACLITAXEL Q3W X2 DOSES Javi Avina MD   Associated diagnosis: Lung mass   noted on 3/12/2020  Associated diagnosis: NSCLC of right lung Stage IIIA, Stage DEREK cT3, cN1, cM0, cT3, cN2, cM1a noted on 10/14/2020   Line of treatment: First Line  Treatment Goal: Control     Upcoming Treatment Dates - OP NSCLC NIVOLUMAB 360 MG D1 & D22 WITH IPILIMUMAB 1 MG/KG Q6W PLUS CARBOPLATIN (AUC) PACLITAXEL Q3W X2 DOSES    10/3/2024       Immunotherapy       nivolumab 360 mg in 0.9% NaCl 86 mL infusion  10/24/2024       Immunotherapy       nivolumab 360 mg in 0.9% NaCl 86 mL infusion       ipilimumab (YERVOY) 1 mg/kg = 81 mg in 0.9% NaCl 66.2 mL chemo infusion  11/14/2024       Immunotherapy       nivolumab 360 mg in 0.9% NaCl 86 mL infusion  12/5/2024       Immunotherapy       nivolumab 360 mg in 0.9% NaCl 86 mL infusion        ipilimumab (YERVOY) 1 mg/kg = 81 mg in 0.9% NaCl 66.2 mL chemo infusion    Supportive Plan Information  OP ZOLEDRONIC ACID (ZOMETA) Q4W Aide Magaña NP   Associated Diagnosis: Malignant neoplasm metastatic to bone   noted on 8/21/2024   Line of treatment: Supportive Care   Treatment goal: Supportive     Upcoming Treatment Dates - OP ZOLEDRONIC ACID (ZOMETA) Q4W    9/16/2024       Medications       zoledronic 4 mg/100 mL infusion 4 mg  10/14/2024       Medications       zoledronic 4 mg/100 mL infusion 4 mg  11/11/2024       Medications       zoledronic 4 mg/100 mL infusion 4 mg  12/9/2024       Medications       zoledronic 4 mg/100 mL infusion 4 mg    Therapy Plan Information  ENTYVIO GI for Colitis, noted on 10/1/2024  Pre-Medications  acetaminophen tablet 650 mg  650 mg, Oral, Every visit  cetirizine tablet 10 mg  10 mg, Oral, Every visit  Medications  vedolizumab (ENTYVIO) 300 mg/250 mL NS IVPB  300 mg, Intravenous, Treatments: 2, 3  vedolizumab (ENTYVIO) 300 mg/250 mL NS IVPB  300 mg, Intravenous, Every 8 weeks  PRN Medications  acetaminophen tablet 650 mg  650 mg, Oral, PRN  albuterol-ipratropium 2.5 mg-0.5 mg/3 mL nebulizer solution 3 mL  3 mL, Nebulization, PRN  methylPREDNISolone sodium succinate injection 40 mg  40 mg, Intravenous, PRN  EPINEPHrine (PF) injection 0.3 mg  0.3 mg, Subcutaneous, PRN  Flushes  0.9% NaCl 250 mL flush bag  Intravenous, Every visit  sodium chloride 0.9% flush 10 mL  10 mL, Intravenous, Every visit  heparin, porcine (PF) 100 unit/mL injection flush 500 Units  500 Units, Intravenous, Every visit  alteplase injection 2 mg  2 mg, Intra-Catheter, Every visit      No therapy plan of the specified type found.    No therapy plan of the specified type found.

## 2025-02-21 NOTE — PLAN OF CARE
Pt received Entyrio. Tolerated well.  No s/s of distress noted. VS charted in flowsheet. PIV  discontinued with no issues. Ambulated off unit independently.

## 2025-02-27 ENCOUNTER — DOCUMENT SCAN (OUTPATIENT)
Dept: HOME HEALTH SERVICES | Facility: HOSPITAL | Age: 70
End: 2025-02-27
Payer: MEDICARE

## 2025-03-11 ENCOUNTER — HOSPITAL ENCOUNTER (OUTPATIENT)
Dept: CARDIOLOGY | Facility: CLINIC | Age: 70
Discharge: HOME | End: 2025-03-11
Payer: MEDICARE

## 2025-03-11 ENCOUNTER — OFFICE VISIT (OUTPATIENT)
Dept: ELECTROPHYSIOLOGY | Facility: CLINIC | Age: 70
End: 2025-03-11
Payer: MEDICARE

## 2025-03-11 ENCOUNTER — CLINICAL SUPPORT (OUTPATIENT)
Dept: CARDIOLOGY | Facility: HOSPITAL | Age: 70
End: 2025-03-11
Attending: INTERNAL MEDICINE
Payer: MEDICARE

## 2025-03-11 VITALS
WEIGHT: 169.06 LBS | DIASTOLIC BLOOD PRESSURE: 80 MMHG | BODY MASS INDEX: 31.11 KG/M2 | HEIGHT: 62 IN | HEART RATE: 83 BPM | SYSTOLIC BLOOD PRESSURE: 130 MMHG

## 2025-03-11 DIAGNOSIS — I42.0 DILATED CARDIOMYOPATHY: ICD-10-CM

## 2025-03-11 DIAGNOSIS — Z95.810 CARDIAC RESYNCHRONIZATION THERAPY DEFIBRILLATOR (CRT-D) IN PLACE: ICD-10-CM

## 2025-03-11 DIAGNOSIS — Z95.810 CARDIAC RESYNCHRONIZATION THERAPY DEFIBRILLATOR (CRT-D) IN PLACE: Primary | ICD-10-CM

## 2025-03-11 LAB
OHS QRS DURATION: 120 MS
OHS QTC CALCULATION: 491 MS

## 2025-03-11 PROCEDURE — 93005 ELECTROCARDIOGRAM TRACING: CPT | Mod: HCNC,S$GLB,, | Performed by: INTERNAL MEDICINE

## 2025-03-11 PROCEDURE — 93010 ELECTROCARDIOGRAM REPORT: CPT | Mod: HCNC,S$GLB,, | Performed by: INTERNAL MEDICINE

## 2025-03-11 PROCEDURE — 93284 PRGRMG EVAL IMPLANTABLE DFB: CPT | Mod: HCNC

## 2025-03-11 PROCEDURE — 99999 PR PBB SHADOW E&M-EST. PATIENT-LVL IV: CPT | Mod: PBBFAC,HCNC,, | Performed by: INTERNAL MEDICINE

## 2025-03-11 NOTE — PROGRESS NOTES
Subjective   Patient ID:  Hannah Montoya is a 69 y.o. female who presents for follow-up of CRT-D      69 yoF NICM, LBBB s/p CRT-D here for second opinion regarding transvenous LV lead placement. She has an attempt at an LV lead 8/10/17 by Dr Lopez. The attempt was complicated by tortuous venous anatomy and inability to pass an LV lead. Venograms show mid and anterolateral branches with tortuous courses. The mid lateral branch was accessed with a 014 wire but the lead could not be advanced. She had an epicardial lead implanted however she has elevated thresholds, 5.0 V @ 1.2 ms. She has expected 1-1.5 y on her battery life giving her overall ~2 years with her current device. She has mild improvement in symptoms. QRS went from 168 to 150 ms with CRT pacing. She is here for a second opinion.     9/18: Attempt at His bundle lead 5/2/18 resulting in dislodgement. Re-attempt 6/15/18 with successful lead placement. Marked improvement in symptoms. ECG with QRS 84 ms. Normal CRT-D function (His lead in LV port).     Interval history: Generator change 11/20/24. Normal device function,     Echo 10/24:  ·  Left Ventricle: The left ventricle is normal in size. Mildly increased wall thickness. There is mild concentric hypertrophy. There is low normal systolic function with a visually estimated ejection fraction of 50 - 55%. Grade I diastolic dysfunction.  ·  Right Ventricle: Normal right ventricular cavity size. Systolic function is normal. Pacemaker lead present in the ventricle.  ·  Pulmonary Artery: The estimated pulmonary artery systolic pressure is 38 mmHg.      Echo 3/30/17:  CONCLUSIONS     1 - Severely depressed left ventricular systolic function (EF 20-25%).     2 - Eccentric hypertrophy.     3 - Severe left atrial enlargement.     4 - Left ventricular diastolic dysfunction.     5 - Severely depressed right ventricular systolic function .     6 - Pulmonary hypertension. The estimated PA systolic pressure is 47  mmHg.     7 - Moderate mitral regurgitation.     8 - Intermediate central venous pressure.     9 - Small pericardial effusion.     My interpretation of the ECG is:    Past Medical History:  No date: AICD (automatic cardioverter/defibrillator) present  No date: Asthma      Comment:  bronchitis in past  2016: Breast cancer      Comment:  right  No date: Cardiac pacemaker  No date: Cardiomyopathy  No date: COPD (chronic obstructive pulmonary disease)  No date: Diabetes mellitus, type 2  No date: Hyperglycemia  No date: Hyperlipidemia  No date: Hypertension  2016: Malignant neoplasm of overlapping sites of female breast  2020: Nuclear sclerosis of both eyes  3/12/2020: Respiratory distress    Past Surgical History:  2023: BIOPSY, WITH CT GUIDANCE; Right      Comment:  Procedure: LUNG MASS BIOPSY, WITH CT GUIDANCE;  Surgeon:               Yehuda Green MD;  Location: [facility] CATH LAB;                 Service: Radiology;  Laterality: Right;  2016: BREAST BIOPSY; Right      Comment:  IDC  No date: BREAST LUMPECTOMY; Right  2016: BREAST SURGERY  2017: CARDIAC CATHETERIZATION; Bilateral  08/10/2017: CARDIAC DEFIBRILLATOR PLACEMENT; Left  2018: CARDIAC DEFIBRILLATOR PLACEMENT; Left  No date:  SECTION      Comment:  x2  No date: CHOLECYSTECTOMY  10/03/2024: COLONOSCOPY; N/A      Comment:  Procedure: COLONOSCOPY;  Surgeon: Wicho Serna MD;                 Location: [facility];  Service: Gastroenterology;                 Laterality: N/A;  10/03/2024: ESOPHAGOGASTRODUODENOSCOPY; N/A      Comment:  Procedure: EGD (ESOPHAGOGASTRODUODENOSCOPY);  Surgeon:                Wicho Serna MD;  Location: [facility];  Service:                Gastroenterology;  Laterality: N/A;  2024: FEMUR BIOPSY; Left      Comment:  Procedure: BIOPSY, FEMUR;  Surgeon: Porter Campos MD;  Location: [facility] OR [address] [place] [address];  Service:               Orthopedics;  Laterality: Left;  : FRACTURE  SURGERY  11/11/2020: INSERTION OF TUNNELED CENTRAL VENOUS CATHETER (CVC) WITH   SUBCUTANEOUS PORT; Right      Comment:  Procedure: [id number], RIGHT;  Surgeon:                Josefa Caceres MD;  Location: [facility] OR [facility] FLR;                 Service: General;  Laterality: Right;  Port-a-cath placed               to R. IJ  08/20/2024: INTRAMEDULLARY RODDING OF FEMUR; Left      Comment:  Procedure: INSERTION, INTRAMEDULLARY ANTOINE, FEMUR;                 Surgeon: Porter Campos MD;  Location: [facility] OR               [facility] FLR;  Service: Orthopedics;  Laterality: Left;  05/28/2020: LUNG BIOPSY; N/A      Comment:  Procedure: BIOPSY, LUNG;  Surgeon: [facility]                Provider;  Location: [facility] OR;  Service: Radiology;                 Laterality: N/A;  8AM STARTRN PREOP 5/26/2020---COVID                NEGATIVE  10/13/2020: NAVIGATIONAL BRONCHOSCOPY; N/A      Comment:  Procedure: BRONCHOSCOPY, NAVIGATIONAL;  Surgeon: Jamilah Weaver MD;  [facility];  Service:                Pulmonary;  Laterality: N/A;  11/20/2024: REPLACEMENT OF IMPLANTABLE CARDIOVERTER-DEFIBRILLATOR   (ICD) GENERATOR; Left      Comment:  Procedure: REPLACEMENT, ICD GENERATOR;  Surgeon:                Javy Gates MD;  Location: [facility] EP LAB;                 Service: Cardiology;  Laterality: Left;  LATOYA, CRT-D gen                elan, JEAN MARIE, MAC, MB, 3 Prep  06/15/2018: REVISION OF IMPLANTABLE CARDIOVERTER-DEFIBRILLATOR (ICD)   ELECTRODE LEAD PLACEMENT; N/A      Comment:  Procedure: REVISION-LEAD-ICD;  Surgeon: Javy Gates MD;  Location: [facility] CATH LAB;  Service:                Cardiology;  Laterality: N/A;  LBBB, Bi-V ICD HIS Ld rev,               MDT, Choice, MB, 3 Prep  10/23/2020: ROBOT-ASSISTED LAPAROSCOPIC LYMPHADENECTOMY USING DA   SALVADOR XI; Right      Comment:  Procedure: XI ROBOTIC LYMPHADENECTOMY;  Surgeon: Ramo Tucker MD;  Location: [facility]  OR 2ND FLR;  Service:                Thoracic;  Laterality: Right;  No date: TUBAL LIGATION    Social History    Socioeconomic History      Marital status:       Number of children: 2    Occupational History        Employer: [employer]    Tobacco Use      Smoking status: Former        Packs/day: 0.00        Years: 0.5 packs/day for 40.0 years (20.0 ttl pk-yrs)        Types: Cigarettes        Start date: 1976        Quit date: 2016        Years since quittin.6        Passive exposure: Past      Smokeless tobacco: Never    Substance and Sexual Activity      Alcohol use: Not Currently        Alcohol/week: 1.0 standard drink of alcohol        Comment: Occasionally      Drug use: Never      Sexual activity: Not Currently        Partners: Male        Birth control/protection: Post-menopausal, None        Comment:     Social Drivers of Health  Financial Resource Strain: Medium Risk (2024)      Overall Financial Resource Strain (CARDIA)          Difficulty of Paying Living Expenses: Somewhat hard  Food Insecurity: Food Insecurity Present (2024)      Hunger Vital Sign          Worried About Running Out of Food in the Last Year: Sometimes true          Ran Out of Food in the Last Year: Sometimes true  Transportation Needs: No Transportation Needs (2024)      PRAPARE - Transportation          Lack of Transportation (Medical): No          Lack of Transportation (Non-Medical): No  Physical Activity: Inactive (2024)      Exercise Vital Sign          Days of Exercise per Week: 0 days          Minutes of Exercise per Session: 0 min  Stress: No Stress Concern Present (2024)      [employer] - Occupational Stress Questionnaire          Feeling of Stress : Only a little  Recent Concern: Stress - Stress Concern Present (10/4/2024)      [employer] - Occupational Stress Questionnaire          Feeling of Stress : To  some extent  Housing Stability: Unknown (12/19/2024)      Housing Stability Vital Sign          Unable to Pay for Housing in the Last Year: No          Homeless in the Last Year: No    Review of patient's family history indicates:  Problem: Kidney disease      Relation: Mother          Name: Hetal Sierra              Age of Onset: (Not Specified)  Problem: Cataracts      Relation: Mother          Name: Hetal Sierra              Age of Onset: (Not Specified)  Problem: Arthritis      Relation: Mother          Name: Hetal Sierra              Age of Onset: (Not Specified)  Problem: Diabetes      Relation: Mother          Name: Hetal Sierra              Age of Onset: (Not Specified)  Problem: Kidney disease      Relation: Father          Name: Betito Sierra              Age of Onset: (Not Specified)  Problem: Cataracts      Relation: Father          Name: Betito Sierra              Age of Onset: (Not Specified)  Problem: Diabetes      Relation: Father          Name: Betito Sierra              Age of Onset: (Not Specified)  Problem: Hearing loss      Relation: Father          Name: Betito Sierra              Age of Onset: (Not Specified)  Problem: Heart disease      Relation: Father          Name: Betito Sierra              Age of Onset: (Not Specified)  Problem: Hypertension      Relation: Father          Name: Betito Sierra              Age of Onset: (Not Specified)  Problem: Stroke      Relation: Father          Name: Betito Sierra              Age of Onset: (Not Specified)  Problem: No Known Problems      Relation: Sister          Name:               Age of Onset: (Not Specified)  Problem: Clotting disorder      Relation: Brother          Name:               Age of Onset: (Not Specified)  Problem: No Known Problems      Relation: Maternal Aunt          Name:               Age of Onset: (Not Specified)  Problem: No Known Problems      Relation: Paternal Aunt          Name:               Age of Onset: (Not  Specified)  Problem: No Known Problems      Relation: Maternal Uncle          Name:               Age of Onset: (Not Specified)  Problem: No Known Problems      Relation: Paternal Uncle          Name:               Age of Onset: (Not Specified)  Problem: No Known Problems      Relation: Maternal Grandfather          Name:               Age of Onset: (Not Specified)  Problem: Macular degeneration      Relation: Maternal Grandmother          Name: Alva Mae              Age of Onset: (Not Specified)  Problem: Vision loss      Relation: Maternal Grandmother          Name: Alva Mae              Age of Onset: (Not Specified)  Problem: No Known Problems      Relation: Paternal Grandfather          Name:               Age of Onset: (Not Specified)  Problem: No Known Problems      Relation: Paternal Grandmother          Name:               Age of Onset: (Not Specified)  Problem: Anxiety disorder      Relation: Daughter          Name:               Age of Onset: (Not Specified)  Problem: Anxiety disorder      Relation: Son          Name:               Age of Onset: (Not Specified)  Problem: Diabetes      Relation: Brother          Name: Ramin Sierra              Age of Onset: (Not Specified)  Problem: Breast cancer      Relation: Neg Hx          Name:               Age of Onset: (Not Specified)  Problem: Ovarian cancer      Relation: Neg Hx          Name:               Age of Onset: (Not Specified)  Problem: Amblyopia      Relation: Neg Hx          Name:               Age of Onset: (Not Specified)  Problem: Blindness      Relation: Neg Hx          Name:               Age of Onset: (Not Specified)  Problem: Cancer      Relation: Neg Hx          Name:               Age of Onset: (Not Specified)  Problem: Glaucoma      Relation: Neg Hx          Name:               Age of Onset: (Not Specified)  Problem: Retinal detachment      Relation: Neg Hx          Name:               Age of Onset: (Not Specified)  Problem:  "Strabismus      Relation: Neg Hx          Name:               Age of Onset: (Not Specified)  Problem: Thyroid disease      Relation: Neg Hx          Name:               Age of Onset: (Not Specified)      Current Outpatient Medications:  ACCU-CHEK ALFRED PLUS TEST STRP Strp, USE TO TEST THREE TIMES DAILY, Disp: 200 strip, Rfl: 3  acetaminophen (TYLENOL) 500 MG tablet, Take 2 tablets (1,000 mg total) by mouth every 8 (eight) hours as needed for Pain., Disp: , Rfl:   albuterol (ACCUNEB) 1.25 mg/3 mL Nebu, use 1 AMPULE (3ml) via NEBULIZER EVERY 6 HOURS AS NEEDED, Disp: 300 mL, Rfl: 3  albuterol (VENTOLIN HFA) 90 mcg/actuation inhaler, Inhale 2 puffs into the lungs every 4 (four) hours as needed for Wheezing or Shortness of Breath. Rescue, Disp: 18 g, Rfl: 4  amLODIPine (NORVASC) 5 MG tablet, TAKE 1 TABLET BY MOUTH EVERY DAY, Disp: 90 tablet, Rfl: 2  apixaban (ELIQUIS DVT-PE TREAT 30D START) 5 mg (74 tabs) DsPk, For the first 7 days take two 5 mg tablets twice daily.  After 7 days take one 5 mg tablet twice daily., Disp: 74 tablet, Rfl: 0  atorvastatin (LIPITOR) 10 MG tablet, TAKE 1 TABLET BY MOUTH EVERY DAY, Disp: 90 tablet, Rfl: 1  BD ULTRA-FINE GIN PEN NEEDLE 32 gauge x 5/32" Ndle, For once daily insulin injections, Disp: 50 each, Rfl: 3  benzonatate (TESSALON) 200 MG capsule, Take 1 capsule (200 mg total) by mouth 3 (three) times daily as needed for Cough., Disp: 30 capsule, Rfl: 1  buPROPion (WELLBUTRIN XL) 150 MG TB24 tablet, TAKE 1 TABLET BY MOUTH EVERY DAY, Disp: 90 tablet, Rfl: 3  cetirizine (ZYRTEC) 10 MG tablet, Take 10 mg by mouth every evening. , Disp: , Rfl:   cholecalciferol, vitamin D3, (VITAMIN D3) 50 mcg (2,000 unit) Tab, Take 1 tablet (2,000 Units total) by mouth once daily., Disp: 30 tablet, Rfl: 11  insulin glargine U-100, Lantus, (LANTUS SOLOSTAR U-100 INSULIN) 100 unit/mL (3 mL) InPn pen, Inject 12 Units into the skin every evening., Disp: 15 mL, Rfl: 1  losartan (COZAAR) 100 MG tablet, TAKE 1 " TABLET BY MOUTH EVERY DAY, Disp: 90 tablet, Rfl: 1  metFORMIN (GLUCOPHAGE) 500 MG tablet, Take 1 tablet (500 mg total) by mouth 2 (two) times daily with meals. Needs appointment for future refills, Disp: 180 tablet, Rfl: 0  metoprolol succinate (TOPROL-XL) 100 MG 24 hr tablet, TAKE 1 TABLET BY MOUTH EVERY DAY, Disp: 90 tablet, Rfl: 1  multivitamin (THERAGRAN) per tablet, Take 1 tablet by mouth once daily., Disp: , Rfl:   ondansetron (ZOFRAN-ODT) 8 MG TbDL, Take 1 tablet (8 mg total) by mouth every 8 (eight) hours as needed (nausea/vomiting). Take 1 tablet (8 mg) by mouth every 8 hours as needed for nausea/vomiting., Disp: 60 tablet, Rfl: 5  pantoprazole (PROTONIX) 40 MG tablet, TAKE 1 TABLET BY MOUTH EVERY DAY, Disp: 90 tablet, Rfl: 3  sertraline (ZOLOFT) 100 MG tablet, TAKE 1 TABLET BY MOUTH EVERY DAY IN THE EVENING, Disp: 90 tablet, Rfl: 3  tiotropium (SPIRIVA WITH HANDIHALER) 18 mcg inhalation capsule, INHALE THE CONTENTS OF 1 CAPSULE BY MOUTH EVERY DAY, Disp: 90 capsule, Rfl: 3  WIXELA INHUB 250-50 mcg/dose diskus inhaler, INHALE 1 PUFF INTO THE LUNGS 2 (TWO) TIMES DAILY. CONTROLLER, Disp: 180 each, Rfl: 3    No current facility-administered medications for this visit.  Facility-Administered Medications Ordered in Other Visits:  fentaNYL injection 25 mcg, 25 mcg, Intravenous, Q5 Min PRN, Keesha Martins MD  haloperidol lactate injection 0.5 mg, 0.5 mg, Intravenous, Once PRN, Keesha Martins MD  HYDROmorphone injection 0.2 mg, 0.2 mg, Intravenous, Q5 Min PRN, Keesha Martins MD  ondansetron injection 4 mg, 4 mg, Intravenous, Once PRN, Keesha Martins MD  sodium chloride 0.9% flush 10 mL, 10 mL, Intravenous, PRN, Keesha Martins MD            Review of Systems   Constitutional: Negative for malaise/fatigue.   Cardiovascular:  Negative for chest pain, dyspnea on exertion, irregular heartbeat, leg swelling and palpitations.   Respiratory:  Negative for shortness of breath.    Hematologic/Lymphatic: Negative for bleeding problem.    Skin:  Negative for rash.   Musculoskeletal:  Negative for myalgias.   Gastrointestinal:  Negative for hematemesis, hematochezia and nausea.   Genitourinary:  Negative for hematuria.   Neurological:  Negative for light-headedness.   Psychiatric/Behavioral:  Negative for altered mental status.    Allergic/Immunologic: Negative for persistent infections.          Objective     Physical Exam  Vitals and nursing note reviewed.   Constitutional:       Appearance: Normal appearance. She is well-developed.   HENT:      Head: Normocephalic.      Nose: Nose normal.   Eyes:      Pupils: Pupils are equal, round, and reactive to light.   Cardiovascular:      Rate and Rhythm: Normal rate and regular rhythm.   Pulmonary:      Effort: No respiratory distress.      Breath sounds: Normal breath sounds.   Chest:      Comments: Device to LUCW. Incision and pocket in good repair.    Musculoskeletal:         General: Normal range of motion.   Skin:     General: Skin is warm and dry.      Findings: No erythema.   Neurological:      Mental Status: She is alert and oriented to person, place, and time.   Psychiatric:         Speech: Speech normal.         Behavior: Behavior normal.            Assessment and Plan     1. Cardiac resynchronization therapy defibrillator (CRT-D) in place        Plan:  69 yoF here for post-operative visit after gen change. Normal CRT-D function (His lead) with no sustained arrhythmias. I discussed routine device follow up including quarterly to bi-annual device checks for device function as well as yearly follow up in the EP clinic. The patient  was advised to call with any concerns regarding their device. Device clinic follow up as scheduled. RTC 1y

## 2025-03-14 LAB
OHS CV AF BURDEN PERCENT: < 1
OHS CV BIV PACING PERCENT: 98.2 %
OHS CV DC REMOTE DEVICE TYPE: NORMAL

## 2025-03-19 ENCOUNTER — OFFICE VISIT (OUTPATIENT)
Dept: FAMILY MEDICINE | Facility: CLINIC | Age: 70
End: 2025-03-19
Payer: MEDICARE

## 2025-03-19 DIAGNOSIS — E78.5 DYSLIPIDEMIA: ICD-10-CM

## 2025-03-19 DIAGNOSIS — C34.91 NSCLC OF RIGHT LUNG: ICD-10-CM

## 2025-03-19 DIAGNOSIS — C79.51 MALIGNANT NEOPLASM METASTATIC TO BONE: ICD-10-CM

## 2025-03-19 DIAGNOSIS — E11.36 TYPE 2 DIABETES MELLITUS WITH DIABETIC CATARACT, WITHOUT LONG-TERM CURRENT USE OF INSULIN: Primary | ICD-10-CM

## 2025-03-19 PROBLEM — L03.116 CELLULITIS OF LEFT LOWER EXTREMITY: Status: RESOLVED | Noted: 2024-11-12 | Resolved: 2025-03-19

## 2025-03-19 PROCEDURE — 1159F MED LIST DOCD IN RCRD: CPT | Mod: HCNC,CPTII,95, | Performed by: FAMILY MEDICINE

## 2025-03-19 PROCEDURE — G2211 COMPLEX E/M VISIT ADD ON: HCPCS | Mod: HCNC,95,, | Performed by: FAMILY MEDICINE

## 2025-03-19 PROCEDURE — 1160F RVW MEDS BY RX/DR IN RCRD: CPT | Mod: HCNC,CPTII,95, | Performed by: FAMILY MEDICINE

## 2025-03-19 PROCEDURE — 98006 SYNCH AUDIO-VIDEO EST MOD 30: CPT | Mod: HCNC,95,, | Performed by: FAMILY MEDICINE

## 2025-03-19 PROCEDURE — 1157F ADVNC CARE PLAN IN RCRD: CPT | Mod: HCNC,CPTII,95, | Performed by: FAMILY MEDICINE

## 2025-03-19 NOTE — PROGRESS NOTES
Routine Office Visit     Patient Name: Hannah Montoya    : 1955  MRN: [id number]    Subjective     History of Present Illness    CHIEF COMPLAINT:  Patient presents today for follow up.    DIABETES:  She reports suboptimal blood sugar control attributed to dietary choices, though readings have improved since discontinuing steroids. Morning blood sugar readings peak at 125. She takes Lantus 12 units without any hypoglycemic episodes and Metformin two tablets in the morning and one at night.    COLITIS AND WEIGHT:  She is currently undergoing infusion therapy for colitis, having completed one infusion with two remaining treatments scheduled. She continues to experience intermittent colitis symptoms. Her weight fluctuates between 162-165 lbs with decreased appetite from baseline.    MUSCULOSKELETAL:  Her previously broken leg and associated wound have healed completely without complications.      ROS:  General: -fever, -chills, -fatigue, -weight gain, +weight loss  Eyes: -vision changes, -redness, -discharge  ENT: -ear pain, -nasal congestion, -sore throat  Cardiovascular: -chest pain, -palpitations, -lower extremity edema  Respiratory: -cough, -shortness of breath  Gastrointestinal: -abdominal pain, -nausea, -vomiting, -diarrhea, -constipation, -blood in stool, +loss of appetite  Genitourinary: -dysuria, -hematuria, -frequency  Musculoskeletal: -joint pain, -muscle pain  Skin: -rash, -lesion  Neurological: -headache, -dizziness, -numbness, -tingling  Psychiatric: -anxiety, -depression, -sleep difficulty           Objective     LMP  (LMP Unknown)   Physical Exam      Assessment     Assessment & Plan    L97.822 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    TYPE 2 DIABETES MELLITUS:  - Assessed blood sugar control, noting improvement from previous readings in the 200-300 range to recent levels around 118.  - Ordered A1c test to evaluate long-term blood sugar control.  - Evaluated weight loss, determining it likely contributes to improved insulin sensitivity.  - Will maintain current medication regimen if A1c results are manageable.  - Considered increasing Lantus dose if A1c levels are elevated.  - Acknowledged improvement in blood sugar but expressed concern about recent diet affecting control.  - Continued Lantus insulin at 12 units.  - Verified that the patient denies experiencing any hypoglycemic episodes.  - Continued Metformin 500 mg, 2 tablets in the morning and 1 at night.  - Approved current Metformin regimen, noting it's acceptable as long as renal function remains normal.  - Patient to continue current dietary habits while monitoring blood sugar.    COLITIS:  - Evaluated colitis as a factor affecting weight and absorption, noting it is likely treatment-related and may take up to 1 year to fully resolve.  - Explained that colitis, as a side effect of cancer treatment, can affect absorption and weight.  - Discussed that colitis typically improves over time but may result in ongoing sensitivity to certain foods.  - Noted that the patient's stable weight indicates colitis has calmed down.  - Continued infusion treatments for colitis, with the current infusion being the second of three planned.  - Planned to restart adenovirus treatment without ovoid after completing colitis treatment.    WEIGHT LOSS AND APPETITE:  - Evaluated weight loss, observing a decrease from 200 lbs to 160-165 lbs.  - Attributed reduced appetite to medications  and colitis affecting absorption.  - Monitored weight stability as an indicator of overall health.  - No specific treatment prescribed for reduced appetite at this time.  - Patient to maintain current weight, as it appears to be stabilizing.  - Noted that the patient reports reduced appetite and eating less than before.    CHOLESTEROL MANAGEMENT:  - Assessed cholesterol levels, recalling previous instruction to discontinue medication due to low levels.  - Ordered cholesterol tests with scheduled labs.    FOLLOW-UP:  - Scheduled follow-up for labs on Friday before infusion appointment.  - Contact the office if any changes occur before next appointment.         1. Type 2 diabetes mellitus with diabetic cataract, without long-term current use of insulin  Hemoglobin A1C    Basic Metabolic Panel  - labs to be done at next lab draw  - continue monitoring blood sugars and notify me if they begin to go higher than they currently average      2. NSCLC of right lung  - managed by hem/onc  - will resume therapy soon pending colitis stays managed      3. Malignant neoplasm metastatic to bone  - pathologic fracture healed  - continue to monitor  - managed by hem/onc      4. Dyslipidemia  Lipid Panel  - to be done fasting  - consider medication adjustment pending results  - currently tolerating statin well                This note was generated with the assistance of ambient listening technology. Verbal consent was obtained by the patient and accompanying visitor(s) for the recording of patient appointment to facilitate this note. I attest to having reviewed and edited the generated note for accuracy, though some syntax or spelling errors may persist. Please contact the author of this note for any clarification.        Emanuel Chavez MD

## 2025-03-21 ENCOUNTER — INFUSION (OUTPATIENT)
Dept: INFUSION THERAPY | Facility: HOSPITAL | Age: 70
End: 2025-03-21
Payer: MEDICARE

## 2025-03-21 ENCOUNTER — OFFICE VISIT (OUTPATIENT)
Dept: HEMATOLOGY/ONCOLOGY | Facility: CLINIC | Age: 70
End: 2025-03-21
Payer: MEDICARE

## 2025-03-21 ENCOUNTER — RESULTS FOLLOW-UP (OUTPATIENT)
Dept: FAMILY MEDICINE | Facility: CLINIC | Age: 70
End: 2025-03-21

## 2025-03-21 VITALS — DIASTOLIC BLOOD PRESSURE: 60 MMHG | SYSTOLIC BLOOD PRESSURE: 131 MMHG | HEART RATE: 82 BPM

## 2025-03-21 VITALS
SYSTOLIC BLOOD PRESSURE: 141 MMHG | WEIGHT: 170.44 LBS | OXYGEN SATURATION: 97 % | TEMPERATURE: 98 F | HEIGHT: 62 IN | DIASTOLIC BLOOD PRESSURE: 79 MMHG | HEART RATE: 79 BPM | BODY MASS INDEX: 31.36 KG/M2

## 2025-03-21 DIAGNOSIS — K52.9 COLITIS: ICD-10-CM

## 2025-03-21 DIAGNOSIS — Z85.3 HISTORY OF BREAST CANCER IN FEMALE: ICD-10-CM

## 2025-03-21 DIAGNOSIS — C79.51 MALIGNANT NEOPLASM METASTATIC TO BONE: ICD-10-CM

## 2025-03-21 DIAGNOSIS — J70.0 RADIATION PNEUMONITIS: ICD-10-CM

## 2025-03-21 DIAGNOSIS — C77.1 SECONDARY AND UNSPECIFIED MALIGNANT NEOPLASM OF INTRATHORACIC LYMPH NODES: ICD-10-CM

## 2025-03-21 DIAGNOSIS — C34.91 NSCLC OF RIGHT LUNG: Primary | ICD-10-CM

## 2025-03-21 DIAGNOSIS — M89.9 LYTIC BONE LESION OF LEFT FEMUR: ICD-10-CM

## 2025-03-21 DIAGNOSIS — Z95.810 CARDIAC RESYNCHRONIZATION THERAPY DEFIBRILLATOR (CRT-D) IN PLACE: ICD-10-CM

## 2025-03-21 DIAGNOSIS — M25.552 LEFT HIP PAIN: ICD-10-CM

## 2025-03-21 DIAGNOSIS — C34.91 NSCLC OF RIGHT LUNG: ICD-10-CM

## 2025-03-21 DIAGNOSIS — I44.7 LEFT BUNDLE-BRANCH BLOCK: ICD-10-CM

## 2025-03-21 DIAGNOSIS — R91.8 LUNG MASS: Primary | ICD-10-CM

## 2025-03-21 DIAGNOSIS — I42.0 DILATED CARDIOMYOPATHY: ICD-10-CM

## 2025-03-21 DIAGNOSIS — E83.52 MALIGNANCY ASSOCIATED HYPERCALCEMIA: ICD-10-CM

## 2025-03-21 PROCEDURE — 63600175 PHARM REV CODE 636 W HCPCS: Mod: JZ,TB,HCNC | Performed by: INTERNAL MEDICINE

## 2025-03-21 PROCEDURE — 96413 CHEMO IV INFUSION 1 HR: CPT | Mod: HCNC

## 2025-03-21 PROCEDURE — 96367 TX/PROPH/DG ADDL SEQ IV INF: CPT | Mod: HCNC

## 2025-03-21 PROCEDURE — 99999 PR PBB SHADOW E&M-EST. PATIENT-LVL IV: CPT | Mod: PBBFAC,HCNC,, | Performed by: INTERNAL MEDICINE

## 2025-03-21 PROCEDURE — 25000003 PHARM REV CODE 250: Mod: HCNC | Performed by: INTERNAL MEDICINE

## 2025-03-21 RX ORDER — SODIUM CHLORIDE 0.9 % (FLUSH) 0.9 %
10 SYRINGE (ML) INJECTION
OUTPATIENT
Start: 2025-03-29

## 2025-03-21 RX ORDER — DIPHENHYDRAMINE HYDROCHLORIDE 50 MG/ML
50 INJECTION, SOLUTION INTRAMUSCULAR; INTRAVENOUS ONCE AS NEEDED
Status: CANCELLED | OUTPATIENT
Start: 2025-03-21

## 2025-03-21 RX ORDER — ACETAMINOPHEN 325 MG/1
650 TABLET ORAL ONCE
OUTPATIENT
Start: 2025-03-29 | End: 2025-03-29

## 2025-03-21 RX ORDER — HEPARIN 100 UNIT/ML
500 SYRINGE INTRAVENOUS
Status: DISCONTINUED | OUTPATIENT
Start: 2025-03-21 | End: 2025-03-21 | Stop reason: HOSPADM

## 2025-03-21 RX ORDER — CETIRIZINE HYDROCHLORIDE 10 MG/1
10 TABLET ORAL ONCE
Status: DISCONTINUED | OUTPATIENT
Start: 2025-03-21 | End: 2025-03-21 | Stop reason: HOSPADM

## 2025-03-21 RX ORDER — DIPHENHYDRAMINE HYDROCHLORIDE 50 MG/ML
50 INJECTION, SOLUTION INTRAMUSCULAR; INTRAVENOUS ONCE AS NEEDED
Status: DISCONTINUED | OUTPATIENT
Start: 2025-03-21 | End: 2025-03-21 | Stop reason: HOSPADM

## 2025-03-21 RX ORDER — EPINEPHRINE 0.3 MG/.3ML
0.3 INJECTION SUBCUTANEOUS ONCE AS NEEDED
Status: DISCONTINUED | OUTPATIENT
Start: 2025-03-21 | End: 2025-03-21 | Stop reason: HOSPADM

## 2025-03-21 RX ORDER — METHYLPREDNISOLONE SOD SUCC 125 MG
40 VIAL (EA) INJECTION
OUTPATIENT
Start: 2025-03-29

## 2025-03-21 RX ORDER — HEPARIN 100 UNIT/ML
500 SYRINGE INTRAVENOUS
OUTPATIENT
Start: 2025-03-29

## 2025-03-21 RX ORDER — SODIUM CHLORIDE 0.9 % (FLUSH) 0.9 %
10 SYRINGE (ML) INJECTION
Status: DISCONTINUED | OUTPATIENT
Start: 2025-03-21 | End: 2025-03-21 | Stop reason: HOSPADM

## 2025-03-21 RX ORDER — SODIUM CHLORIDE 0.9 % (FLUSH) 0.9 %
10 SYRINGE (ML) INJECTION
Status: CANCELLED | OUTPATIENT
Start: 2025-03-21

## 2025-03-21 RX ORDER — EPINEPHRINE 0.3 MG/.3ML
0.3 INJECTION SUBCUTANEOUS ONCE AS NEEDED
Status: CANCELLED | OUTPATIENT
Start: 2025-03-21

## 2025-03-21 RX ORDER — IPRATROPIUM BROMIDE AND ALBUTEROL SULFATE 2.5; .5 MG/3ML; MG/3ML
3 SOLUTION RESPIRATORY (INHALATION)
OUTPATIENT
Start: 2025-03-29

## 2025-03-21 RX ORDER — EPINEPHRINE 1 MG/ML
0.3 INJECTION, SOLUTION, CONCENTRATE INTRAVENOUS
OUTPATIENT
Start: 2025-03-29

## 2025-03-21 RX ORDER — HEPARIN 100 UNIT/ML
500 SYRINGE INTRAVENOUS
Status: CANCELLED | OUTPATIENT
Start: 2025-03-21

## 2025-03-21 RX ORDER — ACETAMINOPHEN 325 MG/1
650 TABLET ORAL ONCE
Status: COMPLETED | OUTPATIENT
Start: 2025-03-21 | End: 2025-03-21

## 2025-03-21 RX ORDER — ACETAMINOPHEN 325 MG/1
650 TABLET ORAL
OUTPATIENT
Start: 2025-03-29

## 2025-03-21 RX ORDER — CETIRIZINE HYDROCHLORIDE 10 MG/1
10 TABLET ORAL ONCE
OUTPATIENT
Start: 2025-03-29 | End: 2025-03-29

## 2025-03-21 RX ADMIN — SODIUM CHLORIDE 360 MG: 9 INJECTION, SOLUTION INTRAVENOUS at 02:03

## 2025-03-21 RX ADMIN — VEDOLIZUMAB 300 MG: 300 INJECTION, POWDER, LYOPHILIZED, FOR SOLUTION INTRAVENOUS at 02:03

## 2025-03-21 RX ADMIN — ACETAMINOPHEN 650 MG: 325 TABLET ORAL at 02:03

## 2025-03-21 NOTE — PROGRESS NOTES
ONCOLOGY FOLLOW UP VISIT.       Cancer/Stage/TNM:    Cancer Staging   NSCLC of right lung  Staging form: Lung, AJCC 8th Edition  - Clinical stage from 9/29/2020: Stage IIIA (cT3, cN1, cM0) - Signed by Ramo Tucker MD on 10/24/2020  - Clinical stage from 7/31/2024: Stage DEREK (cT3, cN2, cM1a) - Signed by Javi Avina MD on 8/2/2024         Oncology History   NSCLC of right lung   9/29/2020 Cancer Staged    Staging form: Lung, AJCC 8th Edition  - Clinical stage from 9/29/2020: Stage IIIA (cT3, cN1, cM0)     10/14/2020 Initial Diagnosis    NSCLC of right lung     11/17/2020 - 12/30/2020 Radiation Therapy    Treating physician: Harvinder Lara     Site  Technique  Energy  Dose/Fx (Gy)  #Fx  Total Dose (Gy)    RLL Lung  VMAT  6X  2  30 / 30  60         2/6/2023 -  Chemotherapy    Treatment Summary   Plan Name: OP NSCLC NIVOLUMAB 360 MG D1 & D22 WITH IPILIMUMAB 1 MG/KG Q6W PLUS CARBOPLATIN (AUC) PACLITAXEL Q3W X2 DOSES  Treatment Goal: Control  Status: Active  Start Date: 2/6/2023  End Date: 2/19/2026 (Planned)  Provider: Javi Avina MD  Chemotherapy: CARBOplatin (PARAPLATIN) 525 mg in sodium chloride 0.9% 337.5 mL chemo infusion, 525 mg, Intravenous, Clinic/HOD 1 time, 1 of 1 cycle  Administration: 525 mg (2/6/2023), 490 mg (2/27/2023)  PACLitaxeL (TAXOL) 200 mg/m2 = 396 mg in sodium chloride 0.9% 500 mL chemo infusion, 200 mg/m2 = 396 mg (100 % of original dose 200 mg/m2), Intravenous, Clinic/HOD 1 time, 1 of 1 cycle  Dose modification: 200 mg/m2 (original dose 200 mg/m2, Cycle 1), 200 mg/m2 (original dose 200 mg/m2, Cycle 1)  Administration: 396 mg (2/6/2023), 396 mg (2/27/2023)     6/12/2023 - 6/30/2023 Radiation Therapy    Treating physician: Harvinder Vásquez Technique Energy Dose/Fx (Gy) #Fx Total Dose (Gy)   RLL Lung RtLung 6X 4 15 / 15 60        7/31/2024 Cancer Staged    Staging form: Lung, AJCC 8th Edition  - Clinical stage from 7/31/2024: Stage DEREK (cT3, cN2, cM1a)          Interval  History:   Now having up 3 BMs per day which is her baseline since gallbladder removal.       ROS:  Review of Systems   Constitutional:  Negative for activity change, appetite change, chills, fatigue, fever and unexpected weight change.   HENT:  Negative for ear pain, facial swelling, hearing loss, mouth sores, nosebleeds, sore throat and trouble swallowing.    Eyes:  Negative for pain, discharge, redness and visual disturbance.   Respiratory:  Negative for cough, chest tightness and shortness of breath.    Cardiovascular:  Negative for chest pain, palpitations and leg swelling.   Gastrointestinal:  Positive for diarrhea (loose). Negative for abdominal distention, abdominal pain, blood in stool, constipation, nausea and vomiting.   Endocrine: Negative for cold intolerance and heat intolerance.   Genitourinary:  Negative for decreased urine volume, difficulty urinating, dysuria, frequency, hematuria, hot flashes, urgency and vaginal bleeding.   Musculoskeletal:  Negative for arthralgias, gait problem, leg pain and myalgias.   Integumentary:  Negative for pallor, rash and wound.   Allergic/Immunologic: Negative for immunocompromised state.   Neurological:  Negative for dizziness, tremors, weakness, light-headedness, numbness and headaches.   Hematological:  Negative for adenopathy. Does not bruise/bleed easily.   Psychiatric/Behavioral:  Negative for agitation, confusion, dysphoric mood and sleep disturbance. The patient is not nervous/anxious.             A complete 12-point review of systems was reviewed and is negative except as mentioned above.     Past Medical History:   Past Medical History:   Diagnosis Date    AICD (automatic cardioverter/defibrillator) present     Asthma     bronchitis in past    Breast cancer 2016    right    Cardiac pacemaker     Cardiomyopathy     COPD (chronic obstructive pulmonary disease)     Diabetes mellitus, type 2     Hyperglycemia     Hyperlipidemia     Hypertension     Malignant  "neoplasm of overlapping sites of female breast 2/12/2016    Nuclear sclerosis of both eyes 8/12/2020    Respiratory distress 3/12/2020        Allergies:   Review of patient's allergies indicates:   Allergen Reactions    Taxol [paclitaxel]      Hypersensitivity reaction to taxol, symptoms included shortness of breath, nausea, dizziness, flushing     Carboplatin Other (See Comments)     Itching and hives    Adhesive Rash     tegaderm burns and blistered skin        Medications:   Current Outpatient Medications   Medication Sig Dispense Refill    ACCU-CHEK ALFRED PLUS TEST STRP Strp USE TO TEST THREE TIMES DAILY 200 strip 3    acetaminophen (TYLENOL) 500 MG tablet Take 2 tablets (1,000 mg total) by mouth every 8 (eight) hours as needed for Pain.      albuterol (ACCUNEB) 1.25 mg/3 mL Nebu use 1 AMPULE (3ml) via NEBULIZER EVERY 6 HOURS AS NEEDED 300 mL 3    albuterol (VENTOLIN HFA) 90 mcg/actuation inhaler Inhale 2 puffs into the lungs every 4 (four) hours as needed for Wheezing or Shortness of Breath. Rescue 18 g 4    amLODIPine (NORVASC) 5 MG tablet TAKE 1 TABLET BY MOUTH EVERY DAY 90 tablet 2    atorvastatin (LIPITOR) 10 MG tablet TAKE 1 TABLET BY MOUTH EVERY DAY 90 tablet 1    BD ULTRA-FINE GIN PEN NEEDLE 32 gauge x 5/32" Ndle For once daily insulin injections 50 each 3    buPROPion (WELLBUTRIN XL) 150 MG TB24 tablet TAKE 1 TABLET BY MOUTH EVERY DAY 90 tablet 3    cetirizine (ZYRTEC) 10 MG tablet Take 10 mg by mouth every evening.       cholecalciferol, vitamin D3, (VITAMIN D3) 50 mcg (2,000 unit) Tab Take 1 tablet (2,000 Units total) by mouth once daily. 30 tablet 11    insulin glargine U-100, Lantus, (LANTUS SOLOSTAR U-100 INSULIN) 100 unit/mL (3 mL) InPn pen Inject 12 Units into the skin every evening. 15 mL 1    losartan (COZAAR) 100 MG tablet TAKE 1 TABLET BY MOUTH EVERY DAY 90 tablet 1    metFORMIN (GLUCOPHAGE) 500 MG tablet Take 1 tablet (500 mg total) by mouth 2 (two) times daily with meals. Needs appointment " "for future refills 180 tablet 0    metoprolol succinate (TOPROL-XL) 100 MG 24 hr tablet TAKE 1 TABLET BY MOUTH EVERY DAY 90 tablet 1    multivitamin (THERAGRAN) per tablet Take 1 tablet by mouth once daily.      ondansetron (ZOFRAN-ODT) 8 MG TbDL Take 1 tablet (8 mg total) by mouth every 8 (eight) hours as needed (nausea/vomiting). Take 1 tablet (8 mg) by mouth every 8 hours as needed for nausea/vomiting. 60 tablet 5    pantoprazole (PROTONIX) 40 MG tablet TAKE 1 TABLET BY MOUTH EVERY DAY 90 tablet 3    sertraline (ZOLOFT) 100 MG tablet TAKE 1 TABLET BY MOUTH EVERY DAY IN THE EVENING 90 tablet 3    tiotropium (SPIRIVA WITH HANDIHALER) 18 mcg inhalation capsule INHALE THE CONTENTS OF 1 CAPSULE BY MOUTH EVERY DAY 90 capsule 3    WIXELA INHUB 250-50 mcg/dose diskus inhaler INHALE 1 PUFF INTO THE LUNGS 2 (TWO) TIMES DAILY. CONTROLLER 180 each 3     No current facility-administered medications for this visit.     Facility-Administered Medications Ordered in Other Visits   Medication Dose Route Frequency Provider Last Rate Last Admin    fentaNYL injection 25 mcg  25 mcg Intravenous Q5 Min PRN Keesha Martins MD        haloperidol lactate injection 0.5 mg  0.5 mg Intravenous Once PRN Keesha Martins MD        HYDROmorphone injection 0.2 mg  0.2 mg Intravenous Q5 Min PRKeesha Mirza MD        ondansetron injection 4 mg  4 mg Intravenous Once PRN Keesha Martins MD        sodium chloride 0.9% flush 10 mL  10 mL Intravenous PRN Keesha Martins MD            Physical Exam:   BP (!) 141/79 (BP Location: Right arm, Patient Position: Sitting)   Pulse 79   Temp 98.4 °F (36.9 °C)   Ht 5' 2" (1.575 m)   Wt 77.3 kg (170 lb 6.7 oz)   LMP  (LMP Unknown)   SpO2 97%   BMI 31.17 kg/m²      ECOG Performance Status: (foot note - ECOG PS provided by Eastern Cooperative Oncology Group) 0 - Asymptomatic    Physical Exam  Vitals and nursing note reviewed.   Constitutional:       Appearance: Normal appearance. She is well-developed and normal weight. "   HENT:      Head: Normocephalic and atraumatic.   Eyes:      Conjunctiva/sclera: Conjunctivae normal.      Pupils: Pupils are equal, round, and reactive to light.   Pulmonary:      Effort: Pulmonary effort is normal. No respiratory distress.   Abdominal:      General: There is no distension.      Palpations: Abdomen is soft.   Musculoskeletal:         General: No swelling. Normal range of motion.      Cervical back: Normal range of motion and neck supple.      Left ankle: Swelling (trace) present.   Lymphadenopathy:      Cervical: No cervical adenopathy.   Skin:     General: Skin is warm and dry.      Findings: No rash.   Neurological:      General: No focal deficit present.      Mental Status: She is alert and oriented to person, place, and time.   Psychiatric:         Mood and Affect: Mood and affect normal.         Behavior: Behavior normal.                 Labs:   Recent Results (from the past 48 hours)   CBC Oncology    Collection Time: 03/21/25 12:18 PM   Result Value Ref Range    WBC 5.34 3.90 - 12.70 K/uL    RBC 4.29 4.00 - 5.40 M/uL    Hemoglobin 10.4 (L) 12.0 - 16.0 g/dL    Hematocrit 35.2 (L) 37.0 - 48.5 %    MCV 82 82 - 98 fL    MCH 24.2 (L) 27.0 - 31.0 pg    MCHC 29.5 (L) 32.0 - 36.0 g/dL    RDW 16.1 (H) 11.5 - 14.5 %    Platelets 233 150 - 450 K/uL    MPV 11.7 9.2 - 12.9 fL    Gran # (ANC) 3.5 1.8 - 7.7 K/uL    Immature Grans (Abs) 0.05 (H) 0.00 - 0.04 K/uL   Comprehensive Metabolic Panel    Collection Time: 03/21/25 12:18 PM   Result Value Ref Range    Sodium 139 136 - 145 mmol/L    Potassium 4.4 3.5 - 5.1 mmol/L    Chloride 106 95 - 110 mmol/L    CO2 23 23 - 29 mmol/L    Glucose 108 70 - 110 mg/dL    BUN 21 8 - 23 mg/dL    Creatinine 0.9 0.5 - 1.4 mg/dL    Calcium 9.6 8.7 - 10.5 mg/dL    Total Protein 7.7 6.0 - 8.4 g/dL    Albumin 3.5 3.5 - 5.2 g/dL    Total Bilirubin 0.4 0.1 - 1.0 mg/dL    Alkaline Phosphatase 83 40 - 150 U/L    AST 18 10 - 40 U/L    ALT 19 10 - 44 U/L    eGFR >60.0 >60 mL/min/1.73  m^2    Anion Gap 10 8 - 16 mmol/L   T4, Free    Collection Time: 03/21/25 12:18 PM   Result Value Ref Range    Free T4 0.88 0.71 - 1.51 ng/dL   TSH    Collection Time: 03/21/25 12:18 PM   Result Value Ref Range    TSH 3.237 0.400 - 4.000 uIU/mL   Hemoglobin A1C    Collection Time: 03/21/25 12:18 PM   Result Value Ref Range    Hemoglobin A1C 6.7 (H) 4.0 - 5.6 %    Estimated Avg Glucose 146 (H) 68 - 131 mg/dL   Basic Metabolic Panel    Collection Time: 03/21/25 12:18 PM   Result Value Ref Range    Sodium 139 136 - 145 mmol/L    Potassium 4.4 3.5 - 5.1 mmol/L    Chloride 106 95 - 110 mmol/L    CO2 23 23 - 29 mmol/L    Glucose 108 70 - 110 mg/dL    BUN 21 8 - 23 mg/dL    Creatinine 0.9 0.5 - 1.4 mg/dL    Calcium 9.6 8.7 - 10.5 mg/dL    Anion Gap 10 8 - 16 mmol/L    eGFR >60.0 >60 mL/min/1.73 m^2   Lipid Panel    Collection Time: 03/21/25 12:18 PM   Result Value Ref Range    Cholesterol 127 120 - 199 mg/dL    Triglycerides 131 30 - 150 mg/dL    HDL 43 40 - 75 mg/dL    LDL Cholesterol 57.8 (L) 63.0 - 159.0 mg/dL    HDL/Cholesterol Ratio 33.9 20.0 - 50.0 %    Total Cholesterol/HDL Ratio 3.0 2.0 - 5.0    Non-HDL Cholesterol 84 mg/dL                  Imaging:   CT Chest Abdomen Pelvis With IV Contrast (XPD) NO Oral Contrast  Narrative: EXAMINATION:  CT CHEST ABDOMEN PELVIS WITH IV CONTRAST (XPD)    CLINICAL HISTORY:  Non-small cell lung cancer (NSCLC), metastatic, assess treatment response;Malignant neoplasm of unspecified part of right bronchus or lung    TECHNIQUE:  Low dose axial images, sagittal and coronal reformations were obtained from the thoracic inlet to the pubic symphysis after IV administration of 75 cc of Omnipaque.    COMPARISON:  09/18/2024    FINDINGS:  Chest:    Heart and mediastinal vasculature: Left-sided pacemaker.  Heart mildly enlarged.  Aortic atherosclerosis.    Larissa/Mediastinum: Precarinal and subcarinal lymph nodes are decreased in size significantly.  Right hilar adenopathy/mass also decreased in  size.  Para-aortic/posterior mediastinal adenopathy has essentially resolved.    Lungs: Volume loss in the right hemithorax and right basilar lung consolidation without air bronchograms appears similar to the prior study with a small loculated right-sided pleural effusion.  Parenchymal scarring in the aerated right lung.  Spiculated nodule in the left lung base is stable.  Extra cardiac pacemaker wires in the left pleural space anteriorly.  There is adjacent lung scarring.    Abdomen and pelvis:    Liver: Normal in size.  Hypoattenuation of the hepatic parenchyma adjacent to the gallbladder fundus and adjacent to the gallbladder body are more conspicuous than on the prior exam is.    Gallbladder: Absent    Bile Ducts: No evidence of dilated ducts.    Pancreas: No mass or peripancreatic fat stranding.    Spleen: Unremarkable.    Adrenals: Generalized left adrenal gland thickening appears similar to prior.  Right adrenal gland unremarkable.    Kidneys/ Ureters: Normal in size and location. No hydronephrosis, solid mass, or nephrolithiasis.  Fetal lobulation versus parenchymal scarring bilaterally.    GI Tract/Mesentery: There is some mucosal hyperenhancement and wall thickening of the rectum and distal sigmoid colon.  Large bowel otherwise unremarkable and small bowel loops have a benign appearance.    Peritoneal Space: No ascites. No free air.    Retroperitoneum: No significant adenopathy.    Vasculature: Aortic atherosclerosis is present.    Bladder: No evidence of wall thickening.    Reproductive organs: Unremarkable.    Bones: No suspicious lytic or blastic lesions.  Impression: In this patient with provided history of non-small cell lung cancer there is stable atelectasis/collapse of the right lower lobe.  No discrete mass able to be measured.    Precarinal, right hilar, and posterior mediastinal adenopathy has improved.    Stable spiculated nodule in the left lower lobe for which attention on follow-up is  suggested.    Increasing conspicuity of pericholecystic hypoattenuation within the liver.  This may represent areas of focal fatty infiltration but as it is a new finding, metastatic disease is a consideration.  Further evaluation with MRI with and without contrast recommended.    Rectosigmoid wall enhancement and thickening which could be related to proctocolitis.  Correlate clinically.    RECIST SUMMARY:    Date of prior examination for comparison: 09/18/2024    Lesion 1: Precarinal lymph node.  1.3 cm. Series 2 image 50.  Prior measurement 1.7 cm.    Lesion 2: Right hilar lymph node.  0 cm. Series 2 image 57.  Prior measurement 1.7 cm.    Lesion 3: Posterior mediastinal lymph node.  0 cm. Series 2 image 80.  Prior measurement 1.2 cm.    Lesion 4: Left lower lobe.  0.9 cm. Series 6 image 372.  Prior measurement 0.9 cm.    Electronically signed by: Serafin Wallace Jr  Date:    01/14/2025  Time:    14:23            Diagnoses:       1. NSCLC of right lung    2. Secondary and unspecified malignant neoplasm of intrathoracic lymph nodes    3. Lytic bone lesion of left femur s/p IM nail on 8/20/2024    4. Left hip pain    5. Colitis    6. Radiation pneumonitis    7. History of breast cancer in female    8. Dilated cardiomyopathy    9. Left bundle-branch block    10. Cardiac resynchronization therapy defibrillator (CRT-D) in place    11. Malignancy associated hypercalcemia    12. Malignant neoplasm metastatic to bone          Assessment and Plan:         1,2. Recurrent NSCLC  Plan is for definitive chemoRT, will need re-staging scans prior.  Reviewed with patient in detail her diagnosis, stage, treatment options, and prognosis (3 year OS 66% vs. 43.5% without durvalumab) with standard of care chemoRT followed by maintenance immunotherapy.  Patient has consented for [id number] clinical trial, a randomized control trial evaluating combination chemoRT + durvalumab followed by maintenance vs. SOC chemoRT -> maintenance.  CT  scans 7/2021 with CR.  - patient randomized on [id number] to SOC arm (Imfinzi) - completed 12/2021.    - CT scan 12/2022 with progressing pulmonary nodule in LLL, still with stable disease in RLL consolidation. PET scan also showing enlarging right lower lobe mass with increased hypermetabolic activity concerning for recurrence. Will present her case at the next thoracic tumor board to discuss biopsy and possible treatment options.   - Biopsy of lung mass consistent with SCC. Initiated on 9LA. Initial re-staging scans with stable disease.   - Patient has completed RT 4-5 weeks ago and has no symptoms. She has some mild inflammation on Chest CT, but since asymptomatic and completed RT will re-initiate IO.  - Now s/p 3 cycles of 9LA. With persistent IO induced rash and continued pneumonitis on recent imaging, plan to hold IO and proceed with surveillance.  - 10/16/23 CT showing continued evidence of inflammation/infection. Currently has cold symptoms. +Covid.   - Repeat CT scan (preliminary reading) shows improvement in inflammation but indeterminate possible new liver metastases. Will confirm with PETCT.  - CT CAP is showing progression of disease in L hilum, hilar LN, mediastinal LN, and pleural effusion.   - Plan is to restart ipi + nivo. Not eligible for any trials.   - Holding Ipi Nivo for colitis. Will re-challenge with Opdivo today. Imaging from 1/14/25 showed response to one dose of Ipi/Nivo.     3,4. Recovering from left femur nailing. Has oxy IR for pain. PT/OT ordered. Will get bone scans with re-staging to evaluate for metastatic bone disease.    5. Grade 4 needing hospitalization. Required multiple days of steroids prior to resolution of symptoms.   - Due for dose #3 today. Patient to report worsening of diarrhea.     5. Resolved. Discussed symptoms to report with re-challenging immunotherapy.     6.  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      7-9.  Stable, follows with cardiology.  AICD exchanged 11/2024.     9,10. Hypercalcemia resolved. Zometa awaiting Dental clearance. Has not seen a dentist in 4 years.    Patient was also seen and examined by Dr. Avina. Patient is in agreement with the proposed treatment plan. All questions were answered to the patient's satisfaction. Pt knows to call clinic if anything is needed before the next clinic visit.    Aide Dumont, MSN, APRN, FNP-C  Hematology and Medical Oncology  Nurse Practitioner to Dr. Javi Avina  Nurse Practitioner, [facility]      I have reviewed the notes, assessments, and/or procedures performed by Aide MONTELONGO, as above.  I have personally interviewed and examined the patient at the beside, and rounded with Aide. I concur with her assessment and plan and the documentation of Hannah Montoya.    MDM includes:    - Acute or chronic illness or injury that poses a threat to life or bodily function  - Independent review and explanation of 2 results from unique tests  - Discussion of management and ordering 2 unique tests  - Extensive discussion of treatment and management  - Prescription drug management  - Drug therapy requiring intensive monitoring for toxicity    Javi Avina M.D.  Hematology/Oncology Attending  Director [facility] Program  [facility]           Route Chart for Scheduling    Med Onc Chart Routing      Follow up with physician 4 weeks. RTC as scheduled   Follow up with DANIEL    Infusion scheduling note    Injection scheduling note    Labs    Imaging    Pharmacy appointment    Other referrals                  Treatment Plan Information   OP NSCLC NIVOLUMAB 360 MG D1 & D22 WITH IPILIMUMAB 1 MG/KG Q6W PLUS CARBOPLATIN (AUC) PACLITAXEL Q3W X2 DOSES Javi Avina MD   Associated diagnosis: Lung mass   noted on 3/12/2020  Associated diagnosis: NSCLC of right lung Stage IIIA, Stage DEREK cT3, cN1, cM0, cT3, cN2, cM1a noted on 10/14/2020   Line of  treatment: First Line  Treatment Goal: Control     Upcoming Treatment Dates - OP NSCLC NIVOLUMAB 360 MG D1 & D22 WITH IPILIMUMAB 1 MG/KG Q6W PLUS CARBOPLATIN (AUC) PACLITAXEL Q3W X2 DOSES    10/3/2024       Immunotherapy       nivolumab 360 mg in 0.9% NaCl 86 mL infusion  10/24/2024       Immunotherapy       nivolumab 360 mg in 0.9% NaCl 86 mL infusion       ipilimumab (YERVOY) 1 mg/kg = 81 mg in 0.9% NaCl 66.2 mL chemo infusion  11/14/2024       Immunotherapy       nivolumab 360 mg in 0.9% NaCl 86 mL infusion  12/5/2024       Immunotherapy       nivolumab 360 mg in 0.9% NaCl 86 mL infusion       ipilimumab (YERVOY) 1 mg/kg = 81 mg in 0.9% NaCl 66.2 mL chemo infusion    Supportive Plan Information  OP ZOLEDRONIC ACID (ZOMETA) Q4W Aide Magaña NP   Associated Diagnosis: Malignant neoplasm metastatic to bone   noted on 8/21/2024   Line of treatment: Supportive Care   Treatment goal: Supportive     Upcoming Treatment Dates - OP ZOLEDRONIC ACID (ZOMETA) Q4W    9/16/2024       Medications       zoledronic 4 mg/100 mL infusion 4 mg  10/14/2024       Medications       zoledronic 4 mg/100 mL infusion 4 mg  11/11/2024       Medications       zoledronic 4 mg/100 mL infusion 4 mg  12/9/2024       Medications       zoledronic 4 mg/100 mL infusion 4 mg    Therapy Plan Information  ENTYVIO GI for Colitis, noted on 10/1/2024  Pre-Medications  acetaminophen tablet 650 mg  650 mg, Oral, Every visit  cetirizine tablet 10 mg  10 mg, Oral, Every visit  Medications  vedolizumab (ENTYVIO) 300 mg/250 mL NS IVPB  300 mg, Intravenous, Treatments: 2, 3  vedolizumab (ENTYVIO) 300 mg/250 mL NS IVPB  300 mg, Intravenous, Every 8 weeks  PRN Medications  acetaminophen tablet 650 mg  650 mg, Oral, PRN  albuterol-ipratropium 2.5 mg-0.5 mg/3 mL nebulizer solution 3 mL  3 mL, Nebulization, PRN  methylPREDNISolone sodium succinate injection 40 mg  40 mg, Intravenous, PRN  EPINEPHrine (PF) injection 0.3 mg  0.3 mg, Subcutaneous, PRN  Flushes  0.9%  NaCl 250 mL flush bag  Intravenous, Every visit  sodium chloride 0.9% flush 10 mL  10 mL, Intravenous, Every visit  heparin, porcine (PF) 100 unit/mL injection flush 500 Units  500 Units, Intravenous, Every visit  alteplase injection 2 mg  2 mg, Intra-Catheter, Every visit      No therapy plan of the specified type found.    No therapy plan of the specified type found.

## 2025-03-21 NOTE — PLAN OF CARE
1519 Pt tolerated Opdivo/Entyvio infusion well today, no complaints or complications,. VSS through duration of treatment. Pt aware to call provider with any questions or concerns and is aware of upcoming appts. Pt ambulatory from clinic with steady gait, no distress noted.

## 2025-03-21 NOTE — PLAN OF CARE
1415 Labs, hx, and medications reviewed, pt meets parameters for treatment today. Assessment completed and plan of care reviewed. Pt verbalized understanding. PIV accessed with no complications. Pt voices no new complaints or concerns, will continue to monitor for safety.

## 2025-03-22 ENCOUNTER — CLINICAL SUPPORT (OUTPATIENT)
Dept: CARDIOLOGY | Facility: HOSPITAL | Age: 70
End: 2025-03-22
Payer: MEDICARE

## 2025-03-22 ENCOUNTER — CLINICAL SUPPORT (OUTPATIENT)
Dept: CARDIOLOGY | Facility: HOSPITAL | Age: 70
End: 2025-03-22
Attending: INTERNAL MEDICINE
Payer: MEDICARE

## 2025-03-22 DIAGNOSIS — I42.9 CARDIOMYOPATHY, UNSPECIFIED: ICD-10-CM

## 2025-03-22 DIAGNOSIS — Z95.810 PRESENCE OF AUTOMATIC (IMPLANTABLE) CARDIAC DEFIBRILLATOR: ICD-10-CM

## 2025-03-22 PROCEDURE — 93296 REM INTERROG EVL PM/IDS: CPT | Mod: HCNC | Performed by: INTERNAL MEDICINE

## 2025-03-22 PROCEDURE — 93295 DEV INTERROG REMOTE 1/2/MLT: CPT | Mod: HCNC,,, | Performed by: INTERNAL MEDICINE

## 2025-03-31 LAB
OHS CV AF BURDEN PERCENT: < 1
OHS CV BIV PACING PERCENT: 98.85 %
OHS CV DC REMOTE DEVICE TYPE: NORMAL
OHS CV RV PACING PERCENT: 2 %

## 2025-04-17 ENCOUNTER — HOSPITAL ENCOUNTER (OUTPATIENT)
Dept: RADIOLOGY | Facility: HOSPITAL | Age: 70
Discharge: HOME | End: 2025-04-17
Attending: NURSE PRACTITIONER
Payer: MEDICARE

## 2025-04-17 DIAGNOSIS — I26.99 PULMONARY EMBOLISM, UNSPECIFIED CHRONICITY, UNSPECIFIED PULMONARY EMBOLISM TYPE, UNSPECIFIED WHETHER ACUTE COR PULMONALE PRESENT: Primary | ICD-10-CM

## 2025-04-17 DIAGNOSIS — C34.91 NSCLC OF RIGHT LUNG: ICD-10-CM

## 2025-04-17 PROCEDURE — 71260 CT THORAX DX C+: CPT | Mod: TC,HCNC

## 2025-04-17 PROCEDURE — 25500020 PHARM REV CODE 255: Mod: HCNC | Performed by: NURSE PRACTITIONER

## 2025-04-17 PROCEDURE — 74177 CT ABD & PELVIS W/CONTRAST: CPT | Mod: 26,HCNC,, | Performed by: RADIOLOGY

## 2025-04-17 PROCEDURE — 71260 CT THORAX DX C+: CPT | Mod: 26,HCNC,, | Performed by: RADIOLOGY

## 2025-04-17 RX ORDER — APIXABAN 5 MG (74)
KIT ORAL
Qty: 74 TABLET | Refills: 0 | Status: SHIPPED | OUTPATIENT
Start: 2025-04-17

## 2025-04-17 RX ADMIN — IOHEXOL 15 ML: 300 INJECTION, SOLUTION INTRAVENOUS at 12:04

## 2025-04-17 RX ADMIN — IOHEXOL 75 ML: 350 INJECTION, SOLUTION INTRAVENOUS at 12:04

## 2025-04-22 ENCOUNTER — INFUSION (OUTPATIENT)
Dept: INFUSION THERAPY | Facility: HOSPITAL | Age: 70
End: 2025-04-22
Payer: MEDICARE

## 2025-04-22 ENCOUNTER — OFFICE VISIT (OUTPATIENT)
Dept: HEMATOLOGY/ONCOLOGY | Facility: CLINIC | Age: 70
End: 2025-04-22
Payer: MEDICARE

## 2025-04-22 VITALS
RESPIRATION RATE: 18 BRPM | DIASTOLIC BLOOD PRESSURE: 62 MMHG | OXYGEN SATURATION: 95 % | SYSTOLIC BLOOD PRESSURE: 139 MMHG | HEART RATE: 85 BPM | TEMPERATURE: 98 F

## 2025-04-22 VITALS
DIASTOLIC BLOOD PRESSURE: 78 MMHG | SYSTOLIC BLOOD PRESSURE: 138 MMHG | BODY MASS INDEX: 31.45 KG/M2 | OXYGEN SATURATION: 96 % | HEIGHT: 62 IN | TEMPERATURE: 99 F | RESPIRATION RATE: 17 BRPM | WEIGHT: 170.88 LBS | HEART RATE: 91 BPM

## 2025-04-22 DIAGNOSIS — C34.91 NSCLC OF RIGHT LUNG: ICD-10-CM

## 2025-04-22 DIAGNOSIS — C77.1 SECONDARY AND UNSPECIFIED MALIGNANT NEOPLASM OF INTRATHORACIC LYMPH NODES: ICD-10-CM

## 2025-04-22 DIAGNOSIS — Z95.810 CARDIAC RESYNCHRONIZATION THERAPY DEFIBRILLATOR (CRT-D) IN PLACE: ICD-10-CM

## 2025-04-22 DIAGNOSIS — E83.52 MALIGNANCY ASSOCIATED HYPERCALCEMIA: ICD-10-CM

## 2025-04-22 DIAGNOSIS — I44.7 LEFT BUNDLE-BRANCH BLOCK: ICD-10-CM

## 2025-04-22 DIAGNOSIS — I26.99 PULMONARY EMBOLISM, UNSPECIFIED CHRONICITY, UNSPECIFIED PULMONARY EMBOLISM TYPE, UNSPECIFIED WHETHER ACUTE COR PULMONALE PRESENT: ICD-10-CM

## 2025-04-22 DIAGNOSIS — K52.9 COLITIS: ICD-10-CM

## 2025-04-22 DIAGNOSIS — I82.431 DEEP VEIN THROMBOSIS (DVT) OF POPLITEAL VEIN OF RIGHT LOWER EXTREMITY, UNSPECIFIED CHRONICITY: ICD-10-CM

## 2025-04-22 DIAGNOSIS — C79.51 MALIGNANT NEOPLASM METASTATIC TO BONE: ICD-10-CM

## 2025-04-22 DIAGNOSIS — R91.8 LUNG MASS: Primary | ICD-10-CM

## 2025-04-22 DIAGNOSIS — Z85.3 HISTORY OF BREAST CANCER IN FEMALE: ICD-10-CM

## 2025-04-22 DIAGNOSIS — I42.0 DILATED CARDIOMYOPATHY: ICD-10-CM

## 2025-04-22 DIAGNOSIS — C34.91 NSCLC OF RIGHT LUNG: Primary | ICD-10-CM

## 2025-04-22 DIAGNOSIS — M25.552 LEFT HIP PAIN: ICD-10-CM

## 2025-04-22 DIAGNOSIS — J70.0 RADIATION PNEUMONITIS: ICD-10-CM

## 2025-04-22 DIAGNOSIS — M89.8X5 LYTIC BONE LESION OF LEFT FEMUR: ICD-10-CM

## 2025-04-22 PROCEDURE — 96413 CHEMO IV INFUSION 1 HR: CPT | Mod: HCNC

## 2025-04-22 PROCEDURE — 99999 PR PBB SHADOW E&M-EST. PATIENT-LVL III: CPT | Mod: PBBFAC,HCNC,, | Performed by: INTERNAL MEDICINE

## 2025-04-22 PROCEDURE — 25000003 PHARM REV CODE 250: Mod: HCNC | Performed by: INTERNAL MEDICINE

## 2025-04-22 PROCEDURE — 63600175 PHARM REV CODE 636 W HCPCS: Mod: JZ,TB,HCNC | Performed by: INTERNAL MEDICINE

## 2025-04-22 RX ORDER — SODIUM CHLORIDE 0.9 % (FLUSH) 0.9 %
10 SYRINGE (ML) INJECTION
Status: DISCONTINUED | OUTPATIENT
Start: 2025-04-22 | End: 2025-04-22 | Stop reason: HOSPADM

## 2025-04-22 RX ORDER — EPINEPHRINE 0.3 MG/.3ML
0.3 INJECTION SUBCUTANEOUS ONCE AS NEEDED
Status: CANCELLED | OUTPATIENT
Start: 2025-04-22

## 2025-04-22 RX ORDER — DIPHENHYDRAMINE HYDROCHLORIDE 50 MG/ML
50 INJECTION, SOLUTION INTRAMUSCULAR; INTRAVENOUS ONCE AS NEEDED
Status: CANCELLED | OUTPATIENT
Start: 2025-04-22

## 2025-04-22 RX ORDER — DIPHENHYDRAMINE HYDROCHLORIDE 50 MG/ML
50 INJECTION, SOLUTION INTRAMUSCULAR; INTRAVENOUS ONCE AS NEEDED
Status: DISCONTINUED | OUTPATIENT
Start: 2025-04-22 | End: 2025-04-22 | Stop reason: HOSPADM

## 2025-04-22 RX ORDER — HEPARIN 100 UNIT/ML
500 SYRINGE INTRAVENOUS
Status: CANCELLED | OUTPATIENT
Start: 2025-04-22

## 2025-04-22 RX ORDER — EPINEPHRINE 0.3 MG/.3ML
0.3 INJECTION SUBCUTANEOUS ONCE AS NEEDED
Status: DISCONTINUED | OUTPATIENT
Start: 2025-04-22 | End: 2025-04-22 | Stop reason: HOSPADM

## 2025-04-22 RX ORDER — SODIUM CHLORIDE 0.9 % (FLUSH) 0.9 %
10 SYRINGE (ML) INJECTION
Status: CANCELLED | OUTPATIENT
Start: 2025-04-22

## 2025-04-22 RX ORDER — HEPARIN 100 UNIT/ML
500 SYRINGE INTRAVENOUS
Status: DISCONTINUED | OUTPATIENT
Start: 2025-04-22 | End: 2025-04-22 | Stop reason: HOSPADM

## 2025-04-22 RX ADMIN — SODIUM CHLORIDE: 9 INJECTION, SOLUTION INTRAVENOUS at 10:04

## 2025-04-22 RX ADMIN — SODIUM CHLORIDE 480 MG: 9 INJECTION, SOLUTION INTRAVENOUS at 10:04

## 2025-04-22 NOTE — PROGRESS NOTES
ONCOLOGY FOLLOW UP VISIT.       Cancer/Stage/TNM:    Cancer Staging   NSCLC of right lung  Staging form: Lung, AJCC 8th Edition  - Clinical stage from 9/29/2020: Stage IIIA (cT3, cN1, cM0) - Signed by Ramo Tucker MD on 10/24/2020  - Clinical stage from 7/31/2024: Stage DEREK (cT3, cN2, cM1a) - Signed by Javi Avina MD on 8/2/2024         Oncology History   NSCLC of right lung   9/29/2020 Cancer Staged    Staging form: Lung, AJCC 8th Edition  - Clinical stage from 9/29/2020: Stage IIIA (cT3, cN1, cM0)     10/14/2020 Initial Diagnosis    NSCLC of right lung     11/17/2020 - 12/30/2020 Radiation Therapy    Treating physician: Harvinder Lara     Site  Technique  Energy  Dose/Fx (Gy)  #Fx  Total Dose (Gy)    RLL Lung  VMAT  6X  2  30 / 30  60         2/6/2023 -  Chemotherapy    Treatment Summary   Plan Name: OP NSCLC NIVOLUMAB 360 MG D1 & D22 WITH IPILIMUMAB 1 MG/KG Q6W PLUS CARBOPLATIN (AUC) PACLITAXEL Q3W X2 DOSES  Treatment Goal: Control  Status: Active  Start Date: 2/6/2023  End Date: 8/7/2026 (Planned)  Provider: Javi Avina MD  Chemotherapy: CARBOplatin (PARAPLATIN) 525 mg in sodium chloride 0.9% 337.5 mL chemo infusion, 525 mg, Intravenous, Clinic/HOD 1 time, 1 of 1 cycle  Administration: 525 mg (2/6/2023), 490 mg (2/27/2023)  PACLitaxeL (TAXOL) 200 mg/m2 = 396 mg in sodium chloride 0.9% 500 mL chemo infusion, 200 mg/m2 = 396 mg (100 % of original dose 200 mg/m2), Intravenous, Clinic/HOD 1 time, 1 of 1 cycle  Dose modification: 200 mg/m2 (original dose 200 mg/m2, Cycle 1), 200 mg/m2 (original dose 200 mg/m2, Cycle 1)  Administration: 396 mg (2/6/2023), 396 mg (2/27/2023)     6/12/2023 - 6/30/2023 Radiation Therapy    Treating physician: Harvinder Vásquez Technique Energy Dose/Fx (Gy) #Fx Total Dose (Gy)   RLL Lung RtLung 6X 4 15 / 15 60        7/31/2024 Cancer Staged    Staging form: Lung, AJCC 8th Edition  - Clinical stage from 7/31/2024: Stage DEREK (cT3, cN2, cM1a)          Interval  History:   Diarrhea resolved. Patient feels well      ROS:  Review of Systems   Constitutional:  Negative for activity change, appetite change, chills, fatigue, fever and unexpected weight change.   HENT:  Negative for ear pain, facial swelling, hearing loss, mouth sores, nosebleeds, sore throat and trouble swallowing.    Eyes:  Negative for pain, discharge, redness and visual disturbance.   Respiratory:  Negative for cough, chest tightness and shortness of breath.    Cardiovascular:  Negative for chest pain, palpitations and leg swelling.   Gastrointestinal:  Positive for diarrhea (loose). Negative for abdominal distention, abdominal pain, blood in stool, constipation, nausea and vomiting.   Endocrine: Negative for cold intolerance and heat intolerance.   Genitourinary:  Negative for decreased urine volume, difficulty urinating, dysuria, frequency, hematuria, hot flashes, urgency and vaginal bleeding.   Musculoskeletal:  Negative for arthralgias, gait problem, leg pain and myalgias.   Integumentary:  Negative for pallor, rash and wound.   Allergic/Immunologic: Negative for immunocompromised state.   Neurological:  Negative for dizziness, tremors, weakness, light-headedness, numbness and headaches.   Hematological:  Negative for adenopathy. Does not bruise/bleed easily.   Psychiatric/Behavioral:  Negative for agitation, confusion, dysphoric mood and sleep disturbance. The patient is not nervous/anxious.             A complete 12-point review of systems was reviewed and is negative except as mentioned above.     Past Medical History:   Past Medical History:   Diagnosis Date    AICD (automatic cardioverter/defibrillator) present     Asthma     bronchitis in past    Breast cancer 2016    right    Cardiac pacemaker     Cardiomyopathy     COPD (chronic obstructive pulmonary disease)     Diabetes mellitus, type 2     Hyperglycemia     Hyperlipidemia     Hypertension     Malignant neoplasm of overlapping sites  "of female breast 2/12/2016    Nuclear sclerosis of both eyes 8/12/2020    Respiratory distress 3/12/2020        Allergies:   Review of patient's allergies indicates:   Allergen Reactions    Taxol [paclitaxel]      Hypersensitivity reaction to taxol, symptoms included shortness of breath, nausea, dizziness, flushing     Carboplatin Other (See Comments)     Itching and hives    Adhesive Rash     tegaderm burns and blistered skin        Medications:   Current Outpatient Medications   Medication Sig Dispense Refill    ACCU-CHEK ALFRED PLUS TEST STRP Strp USE TO TEST THREE TIMES DAILY 200 strip 3    acetaminophen (TYLENOL) 500 MG tablet Take 2 tablets (1,000 mg total) by mouth every 8 (eight) hours as needed for Pain.      albuterol (ACCUNEB) 1.25 mg/3 mL Nebu use 1 AMPULE (3ml) via NEBULIZER EVERY 6 HOURS AS NEEDED 300 mL 3    albuterol (VENTOLIN HFA) 90 mcg/actuation inhaler Inhale 2 puffs into the lungs every 4 (four) hours as needed for Wheezing or Shortness of Breath. Rescue 18 g 4    amLODIPine (NORVASC) 5 MG tablet TAKE 1 TABLET BY MOUTH EVERY DAY 90 tablet 2    apixaban (ELIQUIS DVT-PE TREAT 30D START) 5 mg (74 tabs) DsPk For the first 7 days take two 5 mg tablets twice daily.  After 7 days take one 5 mg tablet twice daily. 74 tablet 0    atorvastatin (LIPITOR) 10 MG tablet TAKE 1 TABLET BY MOUTH EVERY DAY 90 tablet 1    BD ULTRA-FINE GIN PEN NEEDLE 32 gauge x 5/32" Ndle For once daily insulin injections 50 each 3    buPROPion (WELLBUTRIN XL) 150 MG TB24 tablet TAKE 1 TABLET BY MOUTH EVERY DAY 90 tablet 3    cetirizine (ZYRTEC) 10 MG tablet Take 10 mg by mouth every evening.       cholecalciferol, vitamin D3, (VITAMIN D3) 50 mcg (2,000 unit) Tab Take 1 tablet (2,000 Units total) by mouth once daily. 30 tablet 11    insulin glargine U-100, Lantus, (LANTUS SOLOSTAR U-100 INSULIN) 100 unit/mL (3 mL) InPn pen Inject 12 Units into the skin every evening. 15 mL 1    losartan (COZAAR) 100 MG tablet " "TAKE 1 TABLET BY MOUTH EVERY DAY 90 tablet 1    metFORMIN (GLUCOPHAGE) 500 MG tablet Take 1 tablet (500 mg total) by mouth 2 (two) times daily with meals. Needs appointment for future refills 180 tablet 0    metoprolol succinate (TOPROL-XL) 100 MG 24 hr tablet TAKE 1 TABLET BY MOUTH EVERY DAY 90 tablet 1    multivitamin (THERAGRAN) per tablet Take 1 tablet by mouth once daily.      ondansetron (ZOFRAN-ODT) 8 MG TbDL Take 1 tablet (8 mg total) by mouth every 8 (eight) hours as needed (nausea/vomiting). Take 1 tablet (8 mg) by mouth every 8 hours as needed for nausea/vomiting. 60 tablet 5    pantoprazole (PROTONIX) 40 MG tablet TAKE 1 TABLET BY MOUTH EVERY DAY 90 tablet 3    sertraline (ZOLOFT) 100 MG tablet TAKE 1 TABLET BY MOUTH EVERY DAY IN THE EVENING 90 tablet 3    tiotropium (SPIRIVA WITH HANDIHALER) 18 mcg inhalation capsule INHALE THE CONTENTS OF 1 CAPSULE BY MOUTH EVERY DAY 90 capsule 3    WIXELA INHUB 250-50 mcg/dose diskus inhaler INHALE 1 PUFF INTO THE LUNGS 2 (TWO) TIMES DAILY. CONTROLLER 180 each 3     No current facility-administered medications for this visit.     Facility-Administered Medications Ordered in Other Visits   Medication Dose Route Frequency Provider Last Rate Last Admin    fentaNYL injection 25 mcg  25 mcg Intravenous Q5 Min PRKeesha Mirza MD        haloperidol lactate injection 0.5 mg  0.5 mg Intravenous Once PRKeesha Mirza MD        HYDROmorphone injection 0.2 mg  0.2 mg Intravenous Q5 Min PRKeesha Mirza MD        ondansetron injection 4 mg  4 mg Intravenous Once PRKeesha Mirza MD        sodium chloride 0.9% flush 10 mL  10 mL Intravenous PRN Keesha Martins MD            Physical Exam:   /78 (BP Location: Right arm, Patient Position: Sitting)   Pulse 91   Temp 98.5 °F (36.9 °C) (Oral)   Resp 17   Ht 5' 2" (1.575 m)   Wt 77.5 kg (170 lb 13.7 oz)   LMP  (LMP Unknown)   SpO2 96%   BMI 31.25 kg/m²      ECOG Performance Status: (foot note - ECOG PS provided by " Eastern Cooperative Oncology Group) 0 - Asymptomatic    Physical Exam  Constitutional:       General: She is not in acute distress.  HENT:      Head: Normocephalic and atraumatic.   Eyes:      Conjunctiva/sclera: Conjunctivae normal.   Pulmonary:      Effort: Pulmonary effort is normal. No respiratory distress.   Abdominal:      General: There is no distension.      Palpations: Abdomen is soft.      Tenderness: There is no abdominal tenderness.   Musculoskeletal:         General: Normal range of motion.      Cervical back: Normal range of motion and neck supple.   Skin:     General: Skin is warm and dry.      Findings: No rash.   Neurological:      Mental Status: She is alert and oriented to person, place, and time.   Psychiatric:         Mood and Affect: Affect normal.         Judgment: Judgment normal.                  Labs:   No results found for this or any previous visit (from the past 48 hours).                 Imaging:   CT Chest Abdomen Pelvis With IV Contrast (XPD) NO Oral Contrast  Narrative: EXAMINATION:  CT CHEST ABDOMEN PELVIS WITH IV CONTRAST (XPD)    CLINICAL HISTORY:  Non-small cell lung cancer (NSCLC), metastatic, assess treatment response; Malignant neoplasm of unspecified part of right bronchus or lung    TECHNIQUE:  Axial images of the chest, abdomen, and pelvis were acquired  after the use of 75 cc Pnpj910 IV contrast. 15 cc of Omnipaque 300 oral contrast was administered.  Coronal and sagittal reconstructions were also obtained    COMPARISON:  CT chest abdomen pelvis from 01/14/2025.    FINDINGS:  Thoracic soft tissues: Normal thyroid. Cardiac device in the left chest wall with leads in the right atrium and right ventricle similar to prior exam.    Aorta: Thoracic aorta is normal in caliber and contour with no significant calcific atherosclerosis.    Heart: Normal in size. No pericardial effusion. No significant calcific coronary atherosclerosis.    Larissa/Mediastinum: Precarinal lymph node  measures 0.9 cm, previously measuring 1.3 cm (series 2, image 48).    There is a filling defect in the pulmonary artery to the right lower lobe (series 2, image 61), not significantly changed when compared to prior exam.  This likely represents pulmonary thromboembolism.  Mass effect from adjacent atelectasis is considered less likely.    Lungs: Trachea and bronchi are patent.  Volume loss in the right hemithorax with basilar consolidation and air bronchograms.  Small loculated right pleural effusion.  Scarring of the lingula.  Lungs are otherwise clear.  Stable nodule in the left lower lobe (series 6, image 383) measuring 9 mm, previously measuring 9 mm.  No new pulmonary nodules.    Liver: Normal in size and contour.  Subtle 1.4 cm hypodensity in the gallbladder fossa likely representing focal fatty infiltration, unchanged compared to prior exam.    Gallbladder: Surgically absent.    Bile Ducts: No evidence of dilated ducts.    Pancreas: No mass or peripancreatic fat stranding.    Spleen: Unremarkable.    Stomach and duodenum: Unremarkable.    Adrenals: Unremarkable.    Kidneys/ Ureters: Normal in size and location. Normal enhancement. No hydronephrosis or nephrolithiasis. No ureteral dilatation.    Bladder: No evidence of wall thickening.    Reproductive organs: Unremarkable.    Bowel/Mesentery: Small bowel is normal in caliber with no evidence of obstruction.  No evidence of inflammation or wall thickening.  Normal appendix.  Colon demonstrates no focal wall thickening.    Lymph nodes: No lymphadenapathy.    Abdominal wall:  Unremarkable.    Vasculature: No aneurysm. Mild calcific atherosclerosis.    Bones: No acute fracture.  Postsurgical changes of the left hip.  No suspicious osseous lesions.  Impression: In patient with history of non-small cell lung cancer, there is stable atelectasis/collapse of the right lower lobe with no discrete mass.    Continued improvement of precarinal, right hilar, and posterior  mediastinal lymph node size.    Stable pulmonary nodule in the left lower lobe.    Stable hypoattenuating focus in the liver adjacent to the gallbladder fossa likely representing focal fatty infiltration.    Filling defect in the pulmonary artery to the right lower lobe likely representing pulmonary thromboembolism.  Mass effect from adjacent atelectasis is considered less likely.    RECIST SUMMARY:    Date of prior examination for comparison: CT chest abdomen pelvis from 01/14/2025.    Lesion 1: Precarinal lymph node.  0.9 cm. Series 2 image 48.  Prior measurement 1.3 cm.    Lesion 2: Right hilar lymph node.  Not visualized on today's exam.    Lesion 3: Posterior mediastinal lymph node.  Not visualized on today's exam    Lesion 4: Left lower lobe.  0.9 cm. Series 6 image 383.  Prior measurement 0.9 cm.    Findings 1st identified by Dr. Avina at approximately 23:15 on 04/17/2025.  Findings communicated to Aide Dumont nurse practitioner media secure chat with response at approximately 13:25 on 04/17/2025.    Electronically signed by: Enrique Avina MD  Date:    04/17/2025  Time:    13:32            Diagnoses:       1. NSCLC of right lung    2. Secondary and unspecified malignant neoplasm of intrathoracic lymph nodes    3. Lytic bone lesion of left femur s/p IM nail on 8/20/2024    4. Left hip pain    5. Colitis    6. Radiation pneumonitis    7. History of breast cancer in female    8. Dilated cardiomyopathy    9. Left bundle-branch block    10. Cardiac resynchronization therapy defibrillator (CRT-D) in place    11. Malignancy associated hypercalcemia    12. Malignant neoplasm metastatic to bone    13. Pulmonary embolism, unspecified chronicity, unspecified pulmonary embolism type, unspecified whether acute cor pulmonale present    14. Deep vein thrombosis (DVT) of popliteal vein of right lower extremity, unspecified chronicity            Assessment and Plan:         1,2. Recurrent NSCLC  Plan is for  definitive chemoRT, will need re-staging scans prior.  Reviewed with patient in detail her diagnosis, stage, treatment options, and prognosis (3 year OS 66% vs. 43.5% without durvalumab) with standard of care chemoRT followed by maintenance immunotherapy.  Patient has consented for [id number] clinical trial, a randomized control trial evaluating combination chemoRT + durvalumab followed by maintenance vs. SOC chemoRT -> maintenance.  CT scans 7/2021 with CR.  - patient randomized on [id number] to SOC arm (Imfinzi) - completed 12/2021.    - CT scan 12/2022 with progressing pulmonary nodule in LLL, still with stable disease in RLL consolidation. PET scan also showing enlarging right lower lobe mass with increased hypermetabolic activity concerning for recurrence. Will present her case at the next thoracic tumor board to discuss biopsy and possible treatment options.   - Biopsy of lung mass consistent with SCC. Initiated on 9LA. Initial re-staging scans with stable disease.   - Patient has completed RT 4-5 weeks ago and has no symptoms. She has some mild inflammation on Chest CT, but since asymptomatic and completed RT will re-initiate IO.  - Now s/p 3 cycles of 9LA. With persistent IO induced rash and continued pneumonitis on recent imaging, plan to hold IO and proceed with surveillance.  - 10/16/23 CT showing continued evidence of inflammation/infection. Currently has cold symptoms. +Covid.   - Repeat CT scan (preliminary reading) shows improvement in inflammation but indeterminate possible new liver metastases. Will confirm with PETCT.  - CT CAP is showing progression of disease in L hilum, hilar LN, mediastinal LN, and pleural effusion.   - Plan is to restart ipi + nivo. Not eligible for any trials.   - Holding Ipi for colitis. No recurrence with Opdivo, will continue opdivo monotherapy. Imaging with continued partial response.     3,4. Recovering from left femur nailing. Has oxy IR for pain. PT/OT ordered. Will get bone  scans with re-staging to evaluate for metastatic bone disease.    5. Grade 4 needing hospitalization. Required multiple days of steroids prior to resolution of symptoms.   - Due for dose #3 today. Patient to report worsening of diarrhea.     5. Resolved. Discussed symptoms to report with re-challenging immunotherapy.     6.  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      7-9.  Stable, follows with cardiology. AICD exchanged 11/2024.     11,12. Hypercalcemia resolved. Zometa awaiting dental clearance - not cleared for bisphosphonate (needs 4 extractions).    13,14. DVT right popliteal vein in October 2024. Now with PE. Patient only took starter package of Eliquis for DVT. Instructed to restart Eliquis and will need to continue for 6 months at least. Will repeat CTA and US in October.     Patient was also seen and examined by Dr. Avina. Patient is in agreement with the proposed treatment plan. All questions were answered to the patient's satisfaction. Pt knows to call clinic if anything is needed before the next clinic visit.    Aide Dumont, MSN, APRN, FNP-C  Hematology and Medical Oncology  Nurse Practitioner to Dr. Javi Avina  Nurse Practitioner, [facility]      I have reviewed the notes, assessments, and/or procedures performed by Aide MONTELONGO, as above.  I have personally interviewed and examined the patient at the beside, and rounded with Aide. I concur with her assessment and plan and the documentation of Hannah Montoya.    MDM includes:    - Acute or chronic illness or injury that poses a threat to life or bodily function  - Independent review and explanation of 2 results from unique tests  - Discussion of management and ordering 2 unique tests  - Extensive discussion of treatment and management  - Prescription drug management  - Drug therapy requiring intensive monitoring for toxicity    Javi Avina M.D.  Hematology/Oncology  Attending  Director [facility]  [facility]           Route Chart for Scheduling    Med Onc Chart Routing      Follow up with physician    Follow up with DANIEL 4 weeks. with labs prior to Opdivo   Infusion scheduling note    Injection scheduling note    Labs CBC, CMP, free T4 and TSH   Scheduling:  Preferred lab:  Lab interval:     Imaging    Pharmacy appointment    Other referrals            Treatment Plan Information   OP NSCLC NIVOLUMAB 360 MG D1 & D22 WITH IPILIMUMAB 1 MG/KG Q6W PLUS CARBOPLATIN (AUC) PACLITAXEL Q3W X2 DOSES Javi Avina MD   Associated diagnosis: Lung mass   noted on 3/12/2020  Associated diagnosis: NSCLC of right lung Stage IIIA, Stage DEREK cT3, cN1, cM0, cT3, cN2, cM1a noted on 10/14/2020   Line of treatment: First Line  Treatment Goal: Control     Upcoming Treatment Dates - OP NSCLC NIVOLUMAB 360 MG D1 & D22 WITH IPILIMUMAB 1 MG/KG Q6W PLUS CARBOPLATIN (AUC) PACLITAXEL Q3W X2 DOSES    4/11/2025       Immunotherapy       nivolumab 360 mg in 0.9% NaCl 86 mL infusion       ipilimumab (YERVOY) 1 mg/kg = 81 mg in 0.9% NaCl 66.2 mL chemo infusion  5/2/2025       Immunotherapy       nivolumab 360 mg in 0.9% NaCl 86 mL infusion  5/23/2025       Immunotherapy       nivolumab 360 mg in 0.9% NaCl 86 mL infusion       ipilimumab (YERVOY) 1 mg/kg = 81 mg in 0.9% NaCl 66.2 mL chemo infusion  6/13/2025       Immunotherapy       nivolumab 360 mg in 0.9% NaCl 86 mL infusion    Supportive Plan Information  OP ZOLEDRONIC ACID (ZOMETA) Q4W Aide Magaña NP   Associated Diagnosis: Malignant neoplasm metastatic to bone   noted on 8/21/2024   Line of treatment: Supportive Care   Treatment goal: Supportive     Upcoming Treatment Dates - OP ZOLEDRONIC ACID (ZOMETA) Q4W    9/16/2024       Medications       zoledronic 4 mg/100 mL infusion 4 mg  10/14/2024       Medications       zoledronic 4 mg/100 mL infusion 4 mg  11/11/2024       Medications       zoledronic 4 mg/100 mL  infusion 4 mg  12/9/2024       Medications       zoledronic 4 mg/100 mL infusion 4 mg    Therapy Plan Information  ENTYVIO GI for Colitis, noted on 10/1/2024  Pre-Medications  acetaminophen tablet 650 mg  650 mg, Oral, Every visit  cetirizine tablet 10 mg  10 mg, Oral, Every visit  Medications  vedolizumab (ENTYVIO) 300 mg/250 mL NS IVPB  300 mg, Intravenous, Every 8 weeks  PRN Medications  acetaminophen tablet 650 mg  650 mg, Oral, PRN  albuterol-ipratropium 2.5 mg-0.5 mg/3 mL nebulizer solution 3 mL  3 mL, Nebulization, PRN  methylPREDNISolone sodium succinate injection 40 mg  40 mg, Intravenous, PRN  EPINEPHrine (PF) injection 0.3 mg  0.3 mg, Subcutaneous, PRN  Flushes  0.9% NaCl 250 mL flush bag  Intravenous, Every visit  sodium chloride 0.9% flush 10 mL  10 mL, Intravenous, Every visit  heparin, porcine (PF) 100 unit/mL injection flush 500 Units  500 Units, Intravenous, Every visit  alteplase injection 2 mg  2 mg, Intra-Catheter, Every visit      No therapy plan of the specified type found.    No therapy plan of the specified type found.

## 2025-04-22 NOTE — PLAN OF CARE
Pt seated. VS noted. Assessment done. PIV to L forearm, flushed, blood return noted. Infusing NS@25ml/hr while awaiting Opdivo from pharmacy. Will continue to monitor throughout treatment.

## 2025-04-22 NOTE — PLAN OF CARE
Treatment completed. Pt tolerated Opdivo well. VS charted. PIV discontinued. Ambulated off unit independently with no issues

## 2025-04-23 DIAGNOSIS — J44.1 COPD WITH ACUTE EXACERBATION: ICD-10-CM

## 2025-04-23 RX ORDER — FLUTICASONE PROPIONATE AND SALMETEROL 250; 50 UG/1; UG/1
1 POWDER RESPIRATORY (INHALATION) 2 TIMES DAILY
Qty: 180 EACH | Refills: 3 | Status: SHIPPED | OUTPATIENT
Start: 2025-04-23

## 2025-04-23 NOTE — TELEPHONE ENCOUNTER
No care due was identified.  [facility] Embedded Care Due Messages. Reference number: [id number].   4/23/2025 11:35:14 AM CDT

## 2025-04-23 NOTE — TELEPHONE ENCOUNTER
Refill Decision Note   Hannah Montoya  is requesting a refill authorization.  Brief Assessment and Rationale for Refill:  Approve     Medication Therapy Plan:        Comments:     Note composed:2:56 PM 04/23/2025

## 2025-04-24 ENCOUNTER — TELEPHONE (OUTPATIENT)
Dept: HEMATOLOGY/ONCOLOGY | Facility: CLINIC | Age: 70
End: 2025-04-24
Payer: MEDICARE

## 2025-04-24 NOTE — TELEPHONE ENCOUNTER
----- Message from Porter sent at 4/24/2025 10:51 AM CDT -----  Regarding: Consult/Advisory  Contact: [phone]  Consult/Advisory Name Of Caller: Diomedes  Contact Preference: [phone] Nature of call: Calling because they received a clearance letter from you but hey state they have some extractions they need to do on the pt before clearing her and were trying to see if they needed to get clearance from Dr. Avina to do the extractions.

## 2025-04-24 NOTE — TELEPHONE ENCOUNTER
Spoke with dental office, they are asking for us to fill out a clearance form for their office at earliest convenience. Form received via fax, will have provider complete, sign, and send back

## 2025-05-05 ENCOUNTER — PATIENT MESSAGE (OUTPATIENT)
Dept: HEMATOLOGY/ONCOLOGY | Facility: CLINIC | Age: 70
End: 2025-05-05
Payer: MEDICARE

## 2025-05-12 ENCOUNTER — PATIENT MESSAGE (OUTPATIENT)
Dept: HEMATOLOGY/ONCOLOGY | Facility: CLINIC | Age: 70
End: 2025-05-12
Payer: MEDICARE

## 2025-05-13 ENCOUNTER — ON-DEMAND VIRTUAL (OUTPATIENT)
Dept: URGENT CARE | Facility: CLINIC | Age: 70
End: 2025-05-13
Payer: MEDICARE

## 2025-05-13 DIAGNOSIS — J32.9 SINUSITIS, UNSPECIFIED CHRONICITY, UNSPECIFIED LOCATION: Primary | ICD-10-CM

## 2025-05-13 PROCEDURE — 98005 SYNCH AUDIO-VIDEO EST LOW 20: CPT | Mod: 95,,, | Performed by: NURSE PRACTITIONER

## 2025-05-13 RX ORDER — AMOXICILLIN AND CLAVULANATE POTASSIUM 875; 125 MG/1; MG/1
1 TABLET, FILM COATED ORAL EVERY 12 HOURS
Qty: 14 TABLET | Refills: 0 | Status: SHIPPED | OUTPATIENT
Start: 2025-05-13 | End: 2025-05-20

## 2025-05-13 NOTE — PROGRESS NOTES
Subjective:      Patient ID: Hannah Montoya is a 69 y.o. female.    Vitals:  vitals were not taken for this visit.     Chief Complaint: Sinus Problem (Congestion, possible infection)      Visit Type: TELE AUDIOVISUAL    Patient Location: Home     Present with the patient at the time of consultation: TELEMED PRESENT WITH PATIENT: None    Past Medical History:   Diagnosis Date    AICD (automatic cardioverter/defibrillator) present     Asthma     bronchitis in past    Breast cancer 2016    right    Cardiac pacemaker     Cardiomyopathy     COPD (chronic obstructive pulmonary disease)     Diabetes mellitus, type 2     Hyperglycemia     Hyperlipidemia     Hypertension     Malignant neoplasm of overlapping sites of female breast 2016    Nuclear sclerosis of both eyes 2020    Respiratory distress 3/12/2020     Past Surgical History:   Procedure Laterality Date    BIOPSY, WITH CT GUIDANCE Right 2023    Procedure: LUNG MASS BIOPSY, WITH CT GUIDANCE;  Surgeon: Yehuda Green MD;  Location: [facility] CATH LAB;  Service: Radiology;  Laterality: Right;    BREAST BIOPSY Right 2016    IDC    BREAST LUMPECTOMY Right     BREAST SURGERY  2016    CARDIAC CATHETERIZATION Bilateral 2017    CARDIAC DEFIBRILLATOR PLACEMENT Left 08/10/2017    CARDIAC DEFIBRILLATOR PLACEMENT Left 2018     SECTION      x2    CHOLECYSTECTOMY      COLONOSCOPY N/A 10/03/2024    Procedure: COLONOSCOPY;  Surgeon: Wicho Serna MD;  Location: [facility];  Service: Gastroenterology;  Laterality: N/A;    ESOPHAGOGASTRODUODENOSCOPY N/A 10/03/2024    Procedure: EGD (ESOPHAGOGASTRODUODENOSCOPY);  Surgeon: Wicho Serna MD;  Location: [facility];  Service: Gastroenterology;  Laterality: N/A;    FEMUR BIOPSY Left 2024    Procedure: BIOPSY, FEMUR;  Surgeon: Porter Campos MD;  Location: [address];  Service: Orthopedics;  Laterality: Left;    FRACTURE SURGERY      INSERTION OF TUNNELED CENTRAL VENOUS  CATHETER (CVC) WITH SUBCUTANEOUS PORT Right 11/11/2020    Procedure: [id number], RIGHT;  Surgeon: Josefa Caceres MD;  Location: [address];  Service: General;  Laterality: Right;  Port-a-cath placed to R. IJ    INTRAMEDULLARY RODDING OF FEMUR Left 08/20/2024    Procedure: INSERTION, INTRAMEDULLARY ANTOINE, FEMUR;  Surgeon: Porter Campos MD;  Location: [address];  Service: Orthopedics;  Laterality: Left;    LUNG BIOPSY N/A 05/28/2020    Procedure: BIOPSY, LUNG;  Surgeon: [facility];  Location: [facility] OR;  Service: Radiology;  Laterality: N/A;  8AM START  RN PREOP 5/26/2020---COVID NEGATIVE    NAVIGATIONAL BRONCHOSCOPY N/A 10/13/2020    Procedure: BRONCHOSCOPY, NAVIGATIONAL;  Surgeon: Jamilah Weaver MD;  Location: [address];  Service: Pulmonary;  Laterality: N/A;    REPLACEMENT OF IMPLANTABLE CARDIOVERTER-DEFIBRILLATOR (ICD) GENERATOR Left 11/20/2024    Procedure: REPLACEMENT, ICD GENERATOR;  Surgeon: Javy Gates MD;  Location: [facility] EP LAB;  Service: Cardiology;  Laterality: Left;  LATOYA, CRT-D gen change, MDT, MAC, MB, 3 Prep    REVISION OF IMPLANTABLE CARDIOVERTER-DEFIBRILLATOR (ICD) ELECTRODE LEAD PLACEMENT N/A 06/15/2018    Procedure: REVISION-LEAD-ICD;  Surgeon: Javy Gtaes MD;  Location: [facility] CATH LAB;  Service: Cardiology;  Laterality: N/A;  LBBB, Bi-V ICD HIS Elmo rev, MDT, Choice, MB, 3 Prep    ROBOT-ASSISTED LAPAROSCOPIC LYMPHADENECTOMY USING DA SALVADOR XI Right 10/23/2020    Procedure: XI ROBOTIC LYMPHADENECTOMY;  Surgeon: Ramo Tucker MD;  Location: [address];  Service: Thoracic;  Laterality: Right;    TUBAL LIGATION       Review of patient's allergies indicates:   Allergen Reactions    Taxol [paclitaxel]      Hypersensitivity reaction to taxol, symptoms included shortness of breath, nausea, dizziness, flushing     Carboplatin Other (See Comments)     Itching and hives    Adhesive Rash     tegaderm burns and blistered skin      Medications Ordered Prior to Encounter[1]  Family History   Problem Relation Name Age of Onset    Kidney disease Mother Hetal Fostererick     Cataracts Mother Hetal Rigo     Arthritis Mother Hetal Rigo     Diabetes Mother Hetal Rigo     Kidney disease Father Betito Sierra     Cataracts Father Betito Sierra     Diabetes Father Betito Sierra     Hearing loss Father Betito Sierra     Heart disease Father Betito iSerra     Hypertension Father Betito Sierra     Stroke Father Betito Sierra     No Known Problems Sister      Clotting disorder Brother      No Known Problems Maternal Aunt      No Known Problems Paternal Aunt      No Known Problems Maternal Uncle      No Known Problems Paternal Uncle      No Known Problems Maternal Grandfather      Macular degeneration Maternal Grandmother Alva Mae     Vision loss Maternal Grandmother Alva Mae     No Known Problems Paternal Grandfather      No Known Problems Paternal Grandmother      Anxiety disorder Daughter      Anxiety disorder Son      Diabetes Brother Ramin Sierra     Breast cancer Neg Hx      Ovarian cancer Neg Hx      Amblyopia Neg Hx      Blindness Neg Hx      Cancer Neg Hx      Glaucoma Neg Hx      Retinal detachment Neg Hx      Strabismus Neg Hx      Thyroid disease Neg Hx         Medications Ordered                [id number] [place] - [address]   [address] [place] [address]    Telephone: [phone]   Fax: [phone]   Hours: Not open 24 hours                         E-Prescribed (1 of 1)              amoxicillin-clavulanate 875-125mg (AUGMENTIN) 875-125 mg per tablet    Sig: Take 1 tablet by mouth every 12 (twelve) hours. for 7 days       Start: 5/13/25     Quantity: 14 tablet Refills: 0                           Ohs Peq Odvv Intake    5/13/2025  9:43 AM CDT - Filed by Patient   What is your current physical address in the event of a medical emergency? [address]   Are you able to take your vital  signs? Yes   Systolic Blood Pressure: 142   Diastolic Blood Pressure: 92   Weight: 165   Height: 62   Pulse: 118   Temperature:    Respiration rate: 123   Pulse Oxygen: 93   Please attach any relevant images or files    Is your employer contracted with [facility]? No         Sinus symptoms for 1 week. Concerned for a sinus infection. +nasal pain and pressure. No known sick contacts. No relief with OTC meds. Immune compromised. Seeking further treatment options at this time.    Sinus Problem  Associated symptoms include congestion, headaches, sinus pressure and sneezing. Pertinent negatives include no chills, coughing, shortness of breath or sore throat.       Constitution: Negative for chills and fever.   HENT:  Positive for congestion and sinus pressure. Negative for sore throat, trouble swallowing and voice change.    Respiratory:  Negative for cough, shortness of breath and wheezing.    Gastrointestinal:  Negative for nausea, vomiting and diarrhea.   Musculoskeletal:  Negative for muscle ache.   Allergic/Immunologic: Positive for sneezing.   Neurological:  Positive for headaches.        Objective:   The physical exam was conducted virtually.  Physical Exam   Constitutional: She is oriented to person, place, and time. She does not appear ill. No distress.   HENT:   Head: Normocephalic and atraumatic.   Nose: Sinus tenderness present. Right sinus exhibits no maxillary sinus tenderness and no frontal sinus tenderness. Left sinus exhibits no maxillary sinus tenderness and no frontal sinus tenderness.   Eyes: Extraocular movement intact   Pulmonary/Chest: Effort normal.   Abdominal: Normal appearance.   Musculoskeletal: Normal range of motion.         General: Normal range of motion.   Neurological: no focal deficit. She is alert and oriented to person, place, and time.   Psychiatric: Her behavior is normal. Mood normal.   Vitals reviewed.      Assessment:     1. Sinusitis, unspecified chronicity,  unspecified location        Plan:   Patient encouraged to monitor symptoms closely and instructed to follow-up for new or worsening symptoms. Further, in-person, evaluation may be necessary for continued treatment. Please follow up with your primary care doctor or specialist as needed. Verbally discussed plan. Patient confirms understanding and is in agreement with treatment and plan.     You must understand that you've received a Virtual Care evaluation only and that you may be released before all your medical problems are known or treated. You, the patient, will arrange for follow up care as instructed.      Sinusitis, unspecified chronicity, unspecified location  -     amoxicillin-clavulanate 875-125mg (AUGMENTIN) 875-125 mg per tablet; Take 1 tablet by mouth every 12 (twelve) hours. for 7 days  Dispense: 14 tablet; Refill: 0        Patient Instructions   OVER THE COUNTER RECOMMENDATIONS/SUGGESTIONS (IF NO CONTRAINDICATIONS).     ·         Make sure to stay well hydrated.     ·         Use Nasal Saline to mechanically move any post nasal drip from your eustachian tube or from the back of your throat.     ·         Use warm saltwater gargles to ease your throat pain. Warm saltwater gargles as needed for sore throat-  1/2 tsp salt to 1 cup warm water, gargle as desired. Warm fluids tend to relieve a sore throat.     .         Throat lozenges, Chloraseptic spray or other over the counter treatments are ok to use as well. Use as directed.     ·         Use an antihistamine such as Claritin, Zyrtec or Allegra to dry you out.     ·         Use pseudoephedrine (behind the counter) to decongest. Pseudoephedrine  30 mg up to 240 mg /day. It can raise your blood pressure and give you palpitations.     ·         Use Mucinex (guaifenesin) to break up mucous up to 2400mg/day to loosen any mucous.     ·         The Mucinex DM pill has a cough suppressant that can be sedating. It can be used at night to stop the tickle at the  back of your throat.     ·         You can use Mucinex D (it has guaifenesin and a high dose of pseudoephedrine) in the mornings to help decongest.     ·         Use Afrin (oxymetazoline) in each nare for no longer than 3 days, as it is addictive. It can also dry out your mucous membranes and cause elevated blood pressure. This is especially useful if you are flying.     ·         Use Flonase 1-2 sprays/nostril per day. It is a local acting steroid nasal spray, if you develop a bloody nose, stop using the medication immediately.     ·         Sometimes Nyquil at night is beneficial to help you get some rest, however it is sedating, and it does have an antihistamine, and Tylenol.     ·         Honey is a natural cough suppressant that can be used.     ·         Tylenol up to 4,000 mg a day is safe for short periods and can be used for body aches, pain, and fever. However, in high doses and prolonged use it can cause liver irritation.     ·         Ibuprofen is a non-steroidal anti-inflammatory that can be used for body aches, pain, and fever. However, it can also cause stomach irritation if overused.                           [1]   Current Outpatient Medications on File Prior to Visit   Medication Sig Dispense Refill    ACCU-CHEK ALFRED PLUS TEST STRP Strp USE TO TEST THREE TIMES DAILY 200 strip 3    acetaminophen (TYLENOL) 500 MG tablet Take 2 tablets (1,000 mg total) by mouth every 8 (eight) hours as needed for Pain.      albuterol (ACCUNEB) 1.25 mg/3 mL Nebu use 1 AMPULE (3ml) via NEBULIZER EVERY 6 HOURS AS NEEDED 300 mL 3    albuterol (VENTOLIN HFA) 90 mcg/actuation inhaler Inhale 2 puffs into the lungs every 4 (four) hours as needed for Wheezing or Shortness of Breath. Rescue 18 g 4    amLODIPine (NORVASC) 5 MG tablet TAKE 1 TABLET BY MOUTH EVERY DAY 90 tablet 2    apixaban (ELIQUIS DVT-PE TREAT 30D START) 5 mg (74 tabs) DsPk For the first 7 days take two 5 mg tablets twice daily.  After 7 days take one 5 mg  "tablet twice daily. 74 tablet 0    atorvastatin (LIPITOR) 10 MG tablet TAKE 1 TABLET BY MOUTH EVERY DAY 90 tablet 1    BD ULTRA-FINE GIN PEN NEEDLE 32 gauge x 5/32" Ndle For once daily insulin injections 50 each 3    buPROPion (WELLBUTRIN XL) 150 MG TB24 tablet TAKE 1 TABLET BY MOUTH EVERY DAY 90 tablet 3    cetirizine (ZYRTEC) 10 MG tablet Take 10 mg by mouth every evening.       cholecalciferol, vitamin D3, (VITAMIN D3) 50 mcg (2,000 unit) Tab Take 1 tablet (2,000 Units total) by mouth once daily. 30 tablet 11    insulin glargine U-100, Lantus, (LANTUS SOLOSTAR U-100 INSULIN) 100 unit/mL (3 mL) InPn pen Inject 12 Units into the skin every evening. 15 mL 1    losartan (COZAAR) 100 MG tablet TAKE 1 TABLET BY MOUTH EVERY DAY 90 tablet 1    metFORMIN (GLUCOPHAGE) 500 MG tablet Take 1 tablet (500 mg total) by mouth 2 (two) times daily with meals. Needs appointment for future refills 180 tablet 0    metoprolol succinate (TOPROL-XL) 100 MG 24 hr tablet TAKE 1 TABLET BY MOUTH EVERY DAY 90 tablet 1    multivitamin (THERAGRAN) per tablet Take 1 tablet by mouth once daily.      ondansetron (ZOFRAN-ODT) 8 MG TbDL Take 1 tablet (8 mg total) by mouth every 8 (eight) hours as needed (nausea/vomiting). Take 1 tablet (8 mg) by mouth every 8 hours as needed for nausea/vomiting. 60 tablet 5    pantoprazole (PROTONIX) 40 MG tablet TAKE 1 TABLET BY MOUTH EVERY DAY 90 tablet 3    sertraline (ZOLOFT) 100 MG tablet TAKE 1 TABLET BY MOUTH EVERY DAY IN THE EVENING 90 tablet 3    tiotropium (SPIRIVA WITH HANDIHALER) 18 mcg inhalation capsule INHALE THE CONTENTS OF 1 CAPSULE BY MOUTH EVERY DAY 90 capsule 3    WIXELA INHUB 250-50 mcg/dose diskus inhaler INHALE 1 PUFF INTO THE LUNGS 2 (TWO) TIMES DAILY. CONTROLLER 180 each 3     Current Facility-Administered Medications on File Prior to Visit   Medication Dose Route Frequency Provider Last Rate Last Admin    fentaNYL injection 25 mcg  25 mcg Intravenous Q5 Min PRN Keesha Martins MD        " haloperidol lactate injection 0.5 mg  0.5 mg Intravenous Once PRN Keesha Martins MD        HYDROmorphone injection 0.2 mg  0.2 mg Intravenous Q5 Min PRN Keesha Martins MD        ondansetron injection 4 mg  4 mg Intravenous Once PRN Keesha Martins MD        sodium chloride 0.9% flush 10 mL  10 mL Intravenous PRN Keesha Martins MD

## 2025-05-17 DIAGNOSIS — E11.9 TYPE 2 DIABETES MELLITUS WITHOUT COMPLICATION, WITH LONG-TERM CURRENT USE OF INSULIN: ICD-10-CM

## 2025-05-17 DIAGNOSIS — Z79.4 TYPE 2 DIABETES MELLITUS WITHOUT COMPLICATION, WITH LONG-TERM CURRENT USE OF INSULIN: ICD-10-CM

## 2025-05-19 ENCOUNTER — PATIENT MESSAGE (OUTPATIENT)
Dept: ENDOCRINOLOGY | Facility: CLINIC | Age: 70
End: 2025-05-19
Payer: MEDICARE

## 2025-05-19 RX ORDER — INSULIN GLARGINE 100 [IU]/ML
12 INJECTION, SOLUTION SUBCUTANEOUS NIGHTLY
Qty: 15 EACH | Refills: 1 | OUTPATIENT
Start: 2025-05-19 | End: 2026-05-19

## 2025-05-20 ENCOUNTER — TELEPHONE (OUTPATIENT)
Dept: HEMATOLOGY/ONCOLOGY | Facility: CLINIC | Age: 70
End: 2025-05-20
Payer: MEDICARE

## 2025-05-20 NOTE — TELEPHONE ENCOUNTER
----- Message from [employer] sent at 5/20/2025  3:30 PM CDT -----  Regarding: Consult/Advisory  Contact: Elizabeth cifuentes   Consult/Advisory Name Of Caller:Elizabeth cifuentes  Contact Preference:[phone] Nature of call:Elizabeth is calling to speak to nurse about mom having a reaction to treatment. She states she has neck pain, jaw pain  on right side. She also has some swelling. Requesting a call back ASAP

## 2025-05-20 NOTE — TELEPHONE ENCOUNTER
I spoke to Elizabeth, patients daughter. She said her mother has been being treated for a sinus infection with Augmentin for about a week. She did a VV with a provider who prescribed it for her. About 3 days ago, she began having pain at the base of her skull. Now she is c/o pain in her jaw and her face it swollen. She is on her last day of antibiotics. I told her that she needs to go to the ED to be assessed due to the pain and swelling. She verbalized understanding.

## 2025-05-21 ENCOUNTER — HOSPITAL ENCOUNTER (EMERGENCY)
Facility: HOSPITAL | Age: 70
Discharge: HOME | End: 2025-05-21
Attending: EMERGENCY MEDICINE
Payer: MEDICARE

## 2025-05-21 VITALS
OXYGEN SATURATION: 99 % | HEIGHT: 62 IN | TEMPERATURE: 97 F | SYSTOLIC BLOOD PRESSURE: 154 MMHG | BODY MASS INDEX: 31.28 KG/M2 | WEIGHT: 170 LBS | HEART RATE: 91 BPM | RESPIRATION RATE: 16 BRPM | DIASTOLIC BLOOD PRESSURE: 76 MMHG

## 2025-05-21 DIAGNOSIS — S16.1XXA STRAIN OF NECK MUSCLE, INITIAL ENCOUNTER: Primary | ICD-10-CM

## 2025-05-21 PROCEDURE — 63600175 PHARM REV CODE 636 W HCPCS: Mod: HCNC | Performed by: EMERGENCY MEDICINE

## 2025-05-21 PROCEDURE — 99285 EMERGENCY DEPT VISIT HI MDM: CPT | Mod: 25,HCNC

## 2025-05-21 PROCEDURE — 96372 THER/PROPH/DIAG INJ SC/IM: CPT | Performed by: EMERGENCY MEDICINE

## 2025-05-21 PROCEDURE — 25000003 PHARM REV CODE 250: Mod: HCNC | Performed by: EMERGENCY MEDICINE

## 2025-05-21 RX ORDER — HYDROCODONE BITARTRATE AND ACETAMINOPHEN 5; 325 MG/1; MG/1
1 TABLET ORAL EVERY 6 HOURS PRN
Qty: 14 TABLET | Refills: 0 | Status: ON HOLD | OUTPATIENT
Start: 2025-05-21

## 2025-05-21 RX ORDER — DIAZEPAM 10 MG/2ML
5 INJECTION INTRAMUSCULAR
Status: COMPLETED | OUTPATIENT
Start: 2025-05-21 | End: 2025-05-21

## 2025-05-21 RX ORDER — METHOCARBAMOL 500 MG/1
1000 TABLET, FILM COATED ORAL 3 TIMES DAILY
Qty: 30 TABLET | Refills: 0 | Status: SHIPPED | OUTPATIENT
Start: 2025-05-21 | End: 2025-05-26

## 2025-05-21 RX ORDER — LIDOCAINE 50 MG/G
1 PATCH TOPICAL ONCE
Status: DISCONTINUED | OUTPATIENT
Start: 2025-05-21 | End: 2025-05-21 | Stop reason: HOSPADM

## 2025-05-21 RX ORDER — HYDROCODONE BITARTRATE AND ACETAMINOPHEN 5; 325 MG/1; MG/1
1 TABLET ORAL
Refills: 0 | Status: COMPLETED | OUTPATIENT
Start: 2025-05-21 | End: 2025-05-21

## 2025-05-21 RX ORDER — LIDOCAINE 50 MG/G
1 PATCH TOPICAL DAILY
Qty: 15 PATCH | Refills: 0 | Status: SHIPPED | OUTPATIENT
Start: 2025-05-21 | End: 2025-05-24 | Stop reason: ALTCHOICE

## 2025-05-21 RX ADMIN — DIAZEPAM 5 MG: 10 INJECTION, SOLUTION INTRAMUSCULAR; INTRAVENOUS at 12:05

## 2025-05-21 RX ADMIN — LIDOCAINE 1 PATCH: 50 PATCH TOPICAL at 12:05

## 2025-05-21 RX ADMIN — HYDROCODONE BITARTRATE AND ACETAMINOPHEN 1 TABLET: 5; 325 TABLET ORAL at 12:05

## 2025-05-21 NOTE — ED PROVIDER NOTES
Encounter Date: 5/21/2025    SCRIBE #1 NOTE: I, Carmen Tee, am scribing for, and in the presence of,  Roly Haider MD. I have scribed the following portions of the note - Other sections scribed: HPI, ROS.       History     Chief Complaint   Patient presents with    Neck Pain     Pt reports neck pain x 4 days. Hx of metastatic lung cancer to bone. MD office referred her here to be evaluated.  Pt did take ibuprofen for pain without relief.  Pt did have any oxycotin 2.5mg  with some relief.      This 69 y.o female with a medical history of Asthma, Breast cancer, Cardiomyopathy, COPD, Diabetes mellitus type 2, Hyperglycemia, Hyperlipidemia, and Hypertension presents to the ED accompanied by her daughter c/o posterior neck pain for the last 3-4 days. Pt reports that she awoke with the symptoms at onset and thinks she may have slept wrong. She states that the pain shoots up into the occipital region of the head, noting that she has been unable to turn her head due to the pain. She adds that she began to experience bilateral jaw pain radiating down into the shoulder and upper arms yesterday. She also notes bilateral knee pain. Pt reports taking Ibuprofen as well as 1/2 tablet of previously prescribed Oxycontin 5 mg for treatment with some improvement. The symptoms are acute, constant and moderate (6/10). Of note, daughter reports that pt has a history of metastatic lung cancer and is scheduled for her next immunotherapy treatment for tomorrow. She is followed by Hem/Onc at [facility]. There are no other associated symptoms at this time. No treatment attempted just PTA to the ED.     The history is provided by the patient.     Review of patient's allergies indicates:   Allergen Reactions    Taxol [paclitaxel]      Hypersensitivity reaction to taxol, symptoms included shortness of breath, nausea, dizziness, flushing     Carboplatin Other (See Comments)     Itching and hives    Adhesive Rash     tegaderm  burns and blistered skin     Past Medical History:   Diagnosis Date    AICD (automatic cardioverter/defibrillator) present     Asthma     bronchitis in past    Breast cancer 2016    right    Cardiac pacemaker     Cardiomyopathy     COPD (chronic obstructive pulmonary disease)     Diabetes mellitus, type 2     Hyperglycemia     Hyperlipidemia     Hypertension     Malignant neoplasm of overlapping sites of female breast 2016    Nuclear sclerosis of both eyes 2020    Respiratory distress 3/12/2020     Past Surgical History:   Procedure Laterality Date    BIOPSY, WITH CT GUIDANCE Right 2023    Procedure: LUNG MASS BIOPSY, WITH CT GUIDANCE;  Surgeon: Yehuda Green MD;  Location: [facility] CATH LAB;  Service: Radiology;  Laterality: Right;    BREAST BIOPSY Right 2016    IDC    BREAST LUMPECTOMY Right     BREAST SURGERY  2016    CARDIAC CATHETERIZATION Bilateral 2017    CARDIAC DEFIBRILLATOR PLACEMENT Left 08/10/2017    CARDIAC DEFIBRILLATOR PLACEMENT Left 2018     SECTION      x2    CHOLECYSTECTOMY      COLONOSCOPY N/A 10/03/2024    Procedure: COLONOSCOPY;  Surgeon: Wicho Serna MD;  Location: [facility];  Service: Gastroenterology;  Laterality: N/A;    ESOPHAGOGASTRODUODENOSCOPY N/A 10/03/2024    Procedure: EGD (ESOPHAGOGASTRODUODENOSCOPY);  Surgeon: Wicho Serna MD;  Location: [facility];  Service: Gastroenterology;  Laterality: N/A;    FEMUR BIOPSY Left 2024    Procedure: BIOPSY, FEMUR;  Surgeon: Porter Campos MD;  Location: [facility] OR [address] [place] [address];  Service: Orthopedics;  Laterality: Left;    FRACTURE SURGERY      INSERTION OF TUNNELED CENTRAL VENOUS CATHETER (CVC) WITH SUBCUTANEOUS PORT Right 2020    Procedure: [id number], RIGHT;  Surgeon: Josefa Caceres MD;  Location: [facility] OR [address] [place] [address];  Service: General;  Laterality: Right;  Port-a-cath placed to R. IJ    INTRAMEDULLARY RODDING OF FEMUR Left 2024    Procedure: INSERTION, INTRAMEDULLARY  ANTOINE, FEMUR;  Surgeon: Porter Campos MD;  Location: [address];  Service: Orthopedics;  Laterality: Left;    LUNG BIOPSY N/A 05/28/2020    Procedure: BIOPSY, LUNG;  Surgeon: Kalpesh Diagnostic Provider;  Location: [facility];  Service: Radiology;  Laterality: N/A;  8AM START  RN PREOP 5/26/2020---COVID NEGATIVE    NAVIGATIONAL BRONCHOSCOPY N/A 10/13/2020    Procedure: BRONCHOSCOPY, NAVIGATIONAL;  Surgeon: Jamilah Weaver MD;  Location: [address];  Service: Pulmonary;  Laterality: N/A;    REPLACEMENT OF IMPLANTABLE CARDIOVERTER-DEFIBRILLATOR (ICD) GENERATOR Left 11/20/2024    Procedure: REPLACEMENT, ICD GENERATOR;  Surgeon: Javy Gates MD;  Location: [facility] EP LAB;  Service: Cardiology;  Laterality: Left;  LATOYA, CRT-D gen change, MDT, MAC, MB, 3 Prep    REVISION OF IMPLANTABLE CARDIOVERTER-DEFIBRILLATOR (ICD) ELECTRODE LEAD PLACEMENT N/A 06/15/2018    Procedure: REVISION-LEAD-ICD;  Surgeon: Javy Gates MD;  Location: [facility] CATH LAB;  Service: Cardiology;  Laterality: N/A;  LBBB, Bi-V ICD HIS Ld rev, MDT, Choice, MB, 3 Prep    ROBOT-ASSISTED LAPAROSCOPIC LYMPHADENECTOMY USING DA SALVADOR XI Right 10/23/2020    Procedure: XI ROBOTIC LYMPHADENECTOMY;  Surgeon: Ramo Tucker MD;  Location: [address];  Service: Thoracic;  Laterality: Right;    TUBAL LIGATION       Family History   Problem Relation Name Age of Onset    Kidney disease Mother Hetal Rigo     Cataracts Mother Hetal Rigo     Arthritis Mother Hetal Rigo     Diabetes Mother Hetal Rigo     Kidney disease Father Betito Rigo     Cataracts Father Betito Rigo     Diabetes Father Betito Rigo     Hearing loss Father Betito Rigo     Heart disease Father Betito Rigo     Hypertension Father Betito Rigo     Stroke Father Betito Rigo     No Known Problems Sister      Clotting disorder Brother      No Known Problems Maternal Aunt      No Known Problems Paternal Aunt      No Known Problems Maternal Uncle      No  Known Problems Paternal Uncle      No Known Problems Maternal Grandfather      Macular degeneration Maternal Grandmother Alva Mae     Vision loss Maternal Grandmother Alva Mae     No Known Problems Paternal Grandfather      No Known Problems Paternal Grandmother      Anxiety disorder Daughter      Anxiety disorder Son      Diabetes Brother Ramin Sierra     Breast cancer Neg Hx      Ovarian cancer Neg Hx      Amblyopia Neg Hx      Blindness Neg Hx      Cancer Neg Hx      Glaucoma Neg Hx      Retinal detachment Neg Hx      Strabismus Neg Hx      Thyroid disease Neg Hx       Social History[1]  Review of Systems   Constitutional: Negative.    HENT: Negative.          (+) bilateral jaw pain radiating down into the bilateral shoulders and upper arms   Eyes: Negative.    Respiratory: Negative.     Cardiovascular: Negative.    Gastrointestinal: Negative.    Genitourinary: Negative.    Musculoskeletal:  Positive for arthralgias (bilateral shoulder pain; bilateral knee pain) and neck pain (posterior; shooting up into the occipital region of the head).   Skin: Negative.    Neurological: Negative.        Physical Exam     Initial Vitals [05/21/25 1126]   BP Pulse Resp Temp SpO2   (!) 161/88 (!) 113 20 97.4 °F (36.3 °C) (!) 94 %      MAP       --         Physical Exam    Nursing note and vitals reviewed.  Constitutional: She appears well-developed and well-nourished. She is not diaphoretic. No distress.   HENT:   Head: Normocephalic and atraumatic.   Nose: Nose normal.   Eyes: EOM are normal. Pupils are equal, round, and reactive to light.   Neck: Neck supple. No JVD present.   Mild tenderness over midline lower cervical spine area, no paraspinal tenderness noted, no overlying skin changes present.   Normal range of motion.  Cardiovascular:  Normal rate and regular rhythm.           Pulmonary/Chest: No stridor. No respiratory distress.   Musculoskeletal:         General: No tenderness or edema. Normal range of  motion.      Cervical back: Normal range of motion and neck supple.     Neurological: She is alert and oriented to person, place, and time. She has normal strength.   Skin: Skin is warm and dry. Capillary refill takes less than 2 seconds. No rash noted. No erythema.         ED Course   Procedures  Labs Reviewed - No data to display       Imaging Results              CT Cervical Spine Without Contrast (Final result)  Result time 05/21/25 13:04:22      Final result by Dean Miller III, MD (05/21/25 13:04:22)                   Impression:      DJD as above.  There is no evidence of trauma infection or neoplasm.      Electronically signed by: Dean Miller MD  Date:    05/21/2025  Time:    13:04               Narrative:    EXAMINATION:  CT CERVICAL SPINE WITHOUT CONTRAST    CLINICAL HISTORY:  Neck pain, acute, known malignancy;    FINDINGS:  Odontoid, prevertebral soft tissues, and posterior elements are intact.  Fracture dislocation bone destruction seen.  No bone lesions are seen.  The facets are well aligned.  Posterior fossa structures demonstrate nothing unusual.  No skull base fracture seen.  No lytic or blastic bone lesions are seen.  Upper lungs are clear.    C2-C3 demonstrates no abnormality.    C3-C4 demonstrates mild DJD.  The canal and neural foramina are patent.    C4-C5 demonstrates disc osteophyte complex that flattens anterior thecal sac.  There is mild left neural foraminal narrowing.    C5-C6 demonstrates disc osteophyte complex that flattens anterior thecal sac.  There is bilateral neural foraminal narrowing.    C6-C7 demonstrates DJD.  The canal and neural foramina are patent.    C7-T1 and T1-T2 demonstrate no significant abnormalities.  No soft tissue mass or adenopathy is seen.                                       Medications   diazePAM injection 5 mg (5 mg Intramuscular Given 5/21/25 1227)   HYDROcodone-acetaminophen 5-325 mg per tablet 1 tablet (1 tablet Oral Given 5/21/25 1228)      Medical Decision Making  Amount and/or Complexity of Data Reviewed  Radiology: ordered.    Risk  Prescription drug management.      MDM:    69-year-old female with past medical history as noted above presenting with neck pain.  Differential Diagnosis includes:  Fracture, dislocation, instability, meningitis.  Physical exam as noted above.  ED workup notable for CT cervical spine unremarkable.  Patient presentation appears more consistent with cervical strain, unremarkable CT scan today, treated symptomatically here will continue supportive care management outpatient.  She will need close follow-up with her oncologist in clinic for reassessment should symptoms continue. Do not suspect any additional surgical or medical emergency. Discussed diagnosis and further treatment with patient and daughter at bedside, including f/u.  Return precautions given and all questions answered.  Patient and daughter in understanding of plan.  Pt discharged to home improved and stable.        Note was created using voice recognition software. Note may have occasional typographical or grammatical errors, garbled syntax, and other bizarre constructions that may not have been identified and edited despite good eyal initial review prior to signing.             Scribe Attestation:   Scribe #1: I performed the above scribed service and the documentation accurately describes the services I performed. I attest to the accuracy of the note.                         I, Roly Haider M.D., personally performed the services described in this documentation. All medical record entries made by the scribe were at my direction and in my presence. I have reviewed the chart and agree that the record reflects my personal performance and is accurate and complete.      DISCLAIMER: This note was prepared with [employer] voice recognition transcription software. Garbled syntax, mangled pronouns, and other bizarre constructions may be attributed to that  software system.       Clinical Impression:  Final diagnoses:  [S16.1XXA] Strain of neck muscle, initial encounter (Primary)          ED Disposition Condition    Discharge Stable          ED Prescriptions       Medication Sig Dispense Start Date End Date Auth. Provider    methocarbamoL (ROBAXIN) 500 MG Tab Take 2 tablets (1,000 mg total) by mouth 3 (three) times daily. for 5 days 30 tablet 2025 Roly Haider MD    HYDROcodone-acetaminophen (NORCO) 5-325 mg per tablet Take 1 tablet by mouth every 6 (six) hours as needed for Pain. 14 tablet 2025 -- Roly Haider MD    LIDOcaine (LIDODERM) 5 % Place 1 patch onto the skin once daily. Remove & Discard patch within 12 hours or as directed by MD 15 patch 2025 -- Roly Haider MD          Follow-up Information       Follow up With Specialties Details Why Contact Info    [facility] Emergency Medicine Go to  If symptoms worsen [address] [facility] [place] [address] [phone]    Emanuel Chavez MD Family Medicine, Wound Care Go in 1 week As needed [address] [facility] [id number] [phone]                     [1]   Social History  Tobacco Use    Smoking status: Former     Current packs/day: 0.00     Average packs/day: 0.5 packs/day for 40.0 years (20.0 ttl pk-yrs)     Types: Cigarettes     Start date: 1976     Quit date: 2016     Years since quittin.8     Passive exposure: Past    Smokeless tobacco: Never   Substance Use Topics    Alcohol use: Not Currently     Alcohol/week: 1.0 standard drink of alcohol     Comment: Occasionally    Drug use: Never        Roly Haider MD  [phone]

## 2025-05-21 NOTE — ED NOTES
Patient quietly resting in bed with HOB elevated approx 45 degrees.  Family member remains at bedside.  No s/s of distress/discomfort noted and/or voiced at this time..

## 2025-05-21 NOTE — ED TRIAGE NOTES
Pt with hx of metastatic lung cancer to bones c/o neck, head bilateral shoulder and bilateral knee pain x 4 days. Reports pain progressing on yesterday to jaw and back of neck. Pt denies any CP or SOB at present. No distress noted. Took oxycodone yesterday. Minor relief per patient.

## 2025-05-22 ENCOUNTER — TELEPHONE (OUTPATIENT)
Dept: HEMATOLOGY/ONCOLOGY | Facility: CLINIC | Age: 70
End: 2025-05-22
Payer: MEDICARE

## 2025-05-22 NOTE — TELEPHONE ENCOUNTER
----- Message from Tiki sent at 5/22/2025  8:07 AM CDT -----  Regarding: Reschedule Existing Appointment  Contact: Elizabeth Mcgrathhedcierra Existing Appointment Current appt date:05/22/2025 Type of appt :Lab Physician:Fabrice Reason for rescheduling:Pt is not feeling well Caller:Elizabeth Contact Preference:[phone] (Home Phone)

## 2025-05-24 ENCOUNTER — HOSPITAL ENCOUNTER (EMERGENCY)
Facility: HOSPITAL | Age: 70
Discharge: HOME | End: 2025-05-24
Attending: STUDENT IN AN ORGANIZED HEALTH CARE EDUCATION/TRAINING PROGRAM
Payer: MEDICARE

## 2025-05-24 VITALS
OXYGEN SATURATION: 95 % | WEIGHT: 170 LBS | BODY MASS INDEX: 31.28 KG/M2 | DIASTOLIC BLOOD PRESSURE: 67 MMHG | SYSTOLIC BLOOD PRESSURE: 144 MMHG | HEIGHT: 62 IN | RESPIRATION RATE: 20 BRPM | HEART RATE: 102 BPM | TEMPERATURE: 98 F

## 2025-05-24 DIAGNOSIS — M79.604 RIGHT LEG PAIN: ICD-10-CM

## 2025-05-24 DIAGNOSIS — M25.561 ACUTE PAIN OF RIGHT KNEE: Primary | ICD-10-CM

## 2025-05-24 LAB
ABSOLUTE EOSINOPHIL (OHS): 0.02 K/UL
ABSOLUTE MONOCYTE (OHS): 0.98 K/UL (ref 0.3–1)
ABSOLUTE NEUTROPHIL COUNT (OHS): 9.6 K/UL (ref 1.8–7.7)
ALBUMIN SERPL BCP-MCNC: 2.4 G/DL (ref 3.5–5.2)
ALP SERPL-CCNC: 94 UNIT/L (ref 40–150)
ALT SERPL W/O P-5'-P-CCNC: 7 UNIT/L (ref 10–44)
ANION GAP (OHS): 12 MMOL/L (ref 8–16)
AST SERPL-CCNC: 10 UNIT/L (ref 11–45)
BASOPHILS # BLD AUTO: 0.09 K/UL
BASOPHILS NFR BLD AUTO: 0.8 %
BILIRUB SERPL-MCNC: 0.4 MG/DL (ref 0.1–1)
BUN SERPL-MCNC: 23 MG/DL (ref 8–23)
CALCIUM SERPL-MCNC: 9.8 MG/DL (ref 8.7–10.5)
CHLORIDE SERPL-SCNC: 103 MMOL/L (ref 95–110)
CO2 SERPL-SCNC: 23 MMOL/L (ref 23–29)
CREAT SERPL-MCNC: 0.8 MG/DL (ref 0.5–1.4)
ERYTHROCYTE [DISTWIDTH] IN BLOOD BY AUTOMATED COUNT: 16.2 % (ref 11.5–14.5)
GFR SERPLBLD CREATININE-BSD FMLA CKD-EPI: >60 ML/MIN/1.73/M2
GLUCOSE SERPL-MCNC: 202 MG/DL (ref 70–110)
HCT VFR BLD AUTO: 32.5 % (ref 37–48.5)
HGB BLD-MCNC: 9.7 GM/DL (ref 12–16)
IMM GRANULOCYTES # BLD AUTO: 0.32 K/UL (ref 0–0.04)
IMM GRANULOCYTES NFR BLD AUTO: 2.8 % (ref 0–0.5)
LYMPHOCYTES # BLD AUTO: 0.54 K/UL (ref 1–4.8)
MAGNESIUM SERPL-MCNC: 1.2 MG/DL (ref 1.6–2.6)
MCH RBC QN AUTO: 23.4 PG (ref 27–31)
MCHC RBC AUTO-ENTMCNC: 29.8 G/DL (ref 32–36)
MCV RBC AUTO: 79 FL (ref 82–98)
NUCLEATED RBC (/100WBC) (OHS): 0 /100 WBC
PHOSPHATE SERPL-MCNC: 2.9 MG/DL (ref 2.7–4.5)
PLATELET # BLD AUTO: 287 K/UL (ref 150–450)
PMV BLD AUTO: 11.6 FL (ref 9.2–12.9)
POTASSIUM SERPL-SCNC: 3.9 MMOL/L (ref 3.5–5.1)
PROT SERPL-MCNC: 7.6 GM/DL (ref 6–8.4)
RBC # BLD AUTO: 4.14 M/UL (ref 4–5.4)
RELATIVE EOSINOPHIL (OHS): 0.2 %
RELATIVE LYMPHOCYTE (OHS): 4.7 % (ref 18–48)
RELATIVE MONOCYTE (OHS): 8.5 % (ref 4–15)
RELATIVE NEUTROPHIL (OHS): 83 % (ref 38–73)
SODIUM SERPL-SCNC: 138 MMOL/L (ref 136–145)
WBC # BLD AUTO: 11.55 K/UL (ref 3.9–12.7)

## 2025-05-24 PROCEDURE — 25000003 PHARM REV CODE 250: Mod: HCNC | Performed by: STUDENT IN AN ORGANIZED HEALTH CARE EDUCATION/TRAINING PROGRAM

## 2025-05-24 PROCEDURE — 85025 COMPLETE CBC W/AUTO DIFF WBC: CPT | Mod: HCNC | Performed by: STUDENT IN AN ORGANIZED HEALTH CARE EDUCATION/TRAINING PROGRAM

## 2025-05-24 PROCEDURE — 83735 ASSAY OF MAGNESIUM: CPT | Mod: HCNC | Performed by: STUDENT IN AN ORGANIZED HEALTH CARE EDUCATION/TRAINING PROGRAM

## 2025-05-24 PROCEDURE — 96366 THER/PROPH/DIAG IV INF ADDON: CPT | Mod: HCNC

## 2025-05-24 PROCEDURE — 84100 ASSAY OF PHOSPHORUS: CPT | Mod: HCNC | Performed by: STUDENT IN AN ORGANIZED HEALTH CARE EDUCATION/TRAINING PROGRAM

## 2025-05-24 PROCEDURE — 80053 COMPREHEN METABOLIC PANEL: CPT | Mod: HCNC | Performed by: STUDENT IN AN ORGANIZED HEALTH CARE EDUCATION/TRAINING PROGRAM

## 2025-05-24 PROCEDURE — 99285 EMERGENCY DEPT VISIT HI MDM: CPT | Mod: 25,HCNC

## 2025-05-24 PROCEDURE — 96365 THER/PROPH/DIAG IV INF INIT: CPT | Mod: HCNC

## 2025-05-24 PROCEDURE — 96375 TX/PRO/DX INJ NEW DRUG ADDON: CPT | Mod: HCNC

## 2025-05-24 PROCEDURE — 63600175 PHARM REV CODE 636 W HCPCS: Mod: JZ,TB,HCNC | Performed by: STUDENT IN AN ORGANIZED HEALTH CARE EDUCATION/TRAINING PROGRAM

## 2025-05-24 RX ORDER — MAGNESIUM SULFATE 1 G/100ML
1 INJECTION INTRAVENOUS ONCE
Status: COMPLETED | OUTPATIENT
Start: 2025-05-24 | End: 2025-05-24

## 2025-05-24 RX ORDER — DICLOFENAC SODIUM 10 MG/G
2 GEL TOPICAL 4 TIMES DAILY
Qty: 100 G | Refills: 0 | Status: SHIPPED | OUTPATIENT
Start: 2025-05-24

## 2025-05-24 RX ORDER — METOPROLOL SUCCINATE 50 MG/1
100 TABLET, EXTENDED RELEASE ORAL ONCE
Status: COMPLETED | OUTPATIENT
Start: 2025-05-24 | End: 2025-05-24

## 2025-05-24 RX ORDER — KETOROLAC TROMETHAMINE 30 MG/ML
15 INJECTION, SOLUTION INTRAMUSCULAR; INTRAVENOUS
Status: COMPLETED | OUTPATIENT
Start: 2025-05-24 | End: 2025-05-24

## 2025-05-24 RX ORDER — LIDOCAINE 50 MG/G
1 PATCH TOPICAL DAILY
Qty: 14 PATCH | Refills: 0 | Status: SHIPPED | OUTPATIENT
Start: 2025-05-24

## 2025-05-24 RX ADMIN — METOPROLOL SUCCINATE 100 MG: 50 TABLET, EXTENDED RELEASE ORAL at 10:05

## 2025-05-24 RX ADMIN — MAGNESIUM SULFATE IN DEXTROSE 1 G: 10 INJECTION, SOLUTION INTRAVENOUS at 10:05

## 2025-05-24 RX ADMIN — KETOROLAC TROMETHAMINE 15 MG: 30 INJECTION, SOLUTION INTRAMUSCULAR; INTRAVENOUS at 10:05

## 2025-05-30 ENCOUNTER — TELEPHONE (OUTPATIENT)
Dept: HEMATOLOGY/ONCOLOGY | Facility: CLINIC | Age: 70
End: 2025-05-30
Payer: MEDICARE

## 2025-05-30 ENCOUNTER — HOSPITAL ENCOUNTER (INPATIENT)
Facility: HOSPITAL | Age: 70
LOS: 3 days | Discharge: HOME HEALTH | DRG: 556 | End: 2025-06-03
Attending: STUDENT IN AN ORGANIZED HEALTH CARE EDUCATION/TRAINING PROGRAM | Admitting: STUDENT IN AN ORGANIZED HEALTH CARE EDUCATION/TRAINING PROGRAM
Payer: MEDICARE

## 2025-05-30 ENCOUNTER — TELEPHONE (OUTPATIENT)
Dept: PSYCHIATRY | Facility: CLINIC | Age: 70
End: 2025-05-30
Payer: MEDICARE

## 2025-05-30 ENCOUNTER — PATIENT MESSAGE (OUTPATIENT)
Dept: HEMATOLOGY/ONCOLOGY | Facility: CLINIC | Age: 70
End: 2025-05-30
Payer: MEDICARE

## 2025-05-30 DIAGNOSIS — R06.02 SHORTNESS OF BREATH: ICD-10-CM

## 2025-05-30 DIAGNOSIS — D84.9 IMMUNOSUPPRESSION: ICD-10-CM

## 2025-05-30 DIAGNOSIS — E11.9 TYPE 2 DIABETES MELLITUS WITHOUT COMPLICATION, UNSPECIFIED WHETHER LONG TERM INSULIN USE: ICD-10-CM

## 2025-05-30 DIAGNOSIS — M25.562 PAIN AND SWELLING OF LEFT KNEE: ICD-10-CM

## 2025-05-30 DIAGNOSIS — Z86.19 HISTORY OF CMV: ICD-10-CM

## 2025-05-30 DIAGNOSIS — M25.462 PAIN AND SWELLING OF LEFT KNEE: ICD-10-CM

## 2025-05-30 DIAGNOSIS — C79.51 MALIGNANT NEOPLASM METASTATIC TO BONE: ICD-10-CM

## 2025-05-30 DIAGNOSIS — E11.59 HYPERTENSION ASSOCIATED WITH DIABETES: ICD-10-CM

## 2025-05-30 DIAGNOSIS — M79.89 LEG SWELLING: ICD-10-CM

## 2025-05-30 DIAGNOSIS — M79.10 MYALGIA: ICD-10-CM

## 2025-05-30 DIAGNOSIS — Z87.19 HISTORY OF COLITIS: ICD-10-CM

## 2025-05-30 DIAGNOSIS — R07.9 CHEST PAIN: ICD-10-CM

## 2025-05-30 DIAGNOSIS — C34.91 NSCLC OF RIGHT LUNG: ICD-10-CM

## 2025-05-30 DIAGNOSIS — R19.7 ACUTE DIARRHEA: Primary | ICD-10-CM

## 2025-05-30 DIAGNOSIS — I15.2 HYPERTENSION ASSOCIATED WITH DIABETES: ICD-10-CM

## 2025-05-30 DIAGNOSIS — T38.0X5A ADRENAL CORTICOSTEROID CAUSING ADVERSE EFFECT IN THERAPEUTIC USE: ICD-10-CM

## 2025-05-30 LAB
ABSOLUTE EOSINOPHIL (OHS): 0.1 K/UL
ABSOLUTE MONOCYTE (OHS): 1.04 K/UL (ref 0.3–1)
ABSOLUTE NEUTROPHIL COUNT (OHS): 10.17 K/UL (ref 1.8–7.7)
ALBUMIN SERPL BCP-MCNC: 2.5 G/DL (ref 3.5–5.2)
ALP SERPL-CCNC: 92 UNIT/L (ref 40–150)
ALT SERPL W/O P-5'-P-CCNC: 9 UNIT/L (ref 10–44)
ANION GAP (OHS): 16 MMOL/L (ref 8–16)
AST SERPL-CCNC: 12 UNIT/L (ref 11–45)
BASOPHILS # BLD AUTO: 0.06 K/UL
BASOPHILS NFR BLD AUTO: 0.5 %
BILIRUB SERPL-MCNC: 0.3 MG/DL (ref 0.1–1)
BNP SERPL-MCNC: 142 PG/ML (ref 0–99)
BUN SERPL-MCNC: 17 MG/DL (ref 8–23)
CALCIUM SERPL-MCNC: 9.6 MG/DL (ref 8.7–10.5)
CHLORIDE SERPL-SCNC: 100 MMOL/L (ref 95–110)
CO2 SERPL-SCNC: 23 MMOL/L (ref 23–29)
CREAT SERPL-MCNC: 0.9 MG/DL (ref 0.5–1.4)
ERYTHROCYTE [DISTWIDTH] IN BLOOD BY AUTOMATED COUNT: 16.5 % (ref 11.5–14.5)
GFR SERPLBLD CREATININE-BSD FMLA CKD-EPI: >60 ML/MIN/1.73/M2
GLUCOSE SERPL-MCNC: 110 MG/DL (ref 70–110)
HCT VFR BLD AUTO: 31.7 % (ref 37–48.5)
HCV AB SERPL QL IA: NORMAL
HGB BLD-MCNC: 9.1 GM/DL (ref 12–16)
HIV 1+2 AB+HIV1 P24 AG SERPL QL IA: NORMAL
HOLD SPECIMEN: NORMAL
IMM GRANULOCYTES # BLD AUTO: 0.25 K/UL (ref 0–0.04)
IMM GRANULOCYTES NFR BLD AUTO: 2 % (ref 0–0.5)
LYMPHOCYTES # BLD AUTO: 0.84 K/UL (ref 1–4.8)
MCH RBC QN AUTO: 22.5 PG (ref 27–31)
MCHC RBC AUTO-ENTMCNC: 28.7 G/DL (ref 32–36)
MCV RBC AUTO: 78 FL (ref 82–98)
NUCLEATED RBC (/100WBC) (OHS): 0 /100 WBC
PLATELET # BLD AUTO: 402 K/UL (ref 150–450)
PLATELET BLD QL SMEAR: NORMAL
PMV BLD AUTO: 11.4 FL (ref 9.2–12.9)
POTASSIUM SERPL-SCNC: 4.3 MMOL/L (ref 3.5–5.1)
PROT SERPL-MCNC: 7.3 GM/DL (ref 6–8.4)
RBC # BLD AUTO: 4.05 M/UL (ref 4–5.4)
RELATIVE EOSINOPHIL (OHS): 0.8 %
RELATIVE LYMPHOCYTE (OHS): 6.7 % (ref 18–48)
RELATIVE MONOCYTE (OHS): 8.3 % (ref 4–15)
RELATIVE NEUTROPHIL (OHS): 81.7 % (ref 38–73)
SODIUM SERPL-SCNC: 139 MMOL/L (ref 136–145)
TROPONIN I SERPL HS-MCNC: 11 NG/L
WBC # BLD AUTO: 12.46 K/UL (ref 3.9–12.7)

## 2025-05-30 PROCEDURE — 96372 THER/PROPH/DIAG INJ SC/IM: CPT | Performed by: INTERNAL MEDICINE

## 2025-05-30 PROCEDURE — 93005 ELECTROCARDIOGRAM TRACING: CPT | Mod: HCNC

## 2025-05-30 PROCEDURE — 82550 ASSAY OF CK (CPK): CPT | Mod: HCNC | Performed by: INTERNAL MEDICINE

## 2025-05-30 PROCEDURE — 85652 RBC SED RATE AUTOMATED: CPT | Mod: HCNC | Performed by: INTERNAL MEDICINE

## 2025-05-30 PROCEDURE — 85025 COMPLETE CBC W/AUTO DIFF WBC: CPT | Mod: HCNC | Performed by: STUDENT IN AN ORGANIZED HEALTH CARE EDUCATION/TRAINING PROGRAM

## 2025-05-30 PROCEDURE — G0378 HOSPITAL OBSERVATION PER HR: HCPCS | Mod: HCNC

## 2025-05-30 PROCEDURE — 83880 ASSAY OF NATRIURETIC PEPTIDE: CPT | Mod: HCNC | Performed by: STUDENT IN AN ORGANIZED HEALTH CARE EDUCATION/TRAINING PROGRAM

## 2025-05-30 PROCEDURE — U0002 COVID-19 LAB TEST NON-CDC: HCPCS | Mod: HCNC | Performed by: STUDENT IN AN ORGANIZED HEALTH CARE EDUCATION/TRAINING PROGRAM

## 2025-05-30 PROCEDURE — 80053 COMPREHEN METABOLIC PANEL: CPT | Mod: HCNC | Performed by: STUDENT IN AN ORGANIZED HEALTH CARE EDUCATION/TRAINING PROGRAM

## 2025-05-30 PROCEDURE — 86803 HEPATITIS C AB TEST: CPT | Mod: HCNC | Performed by: PHYSICIAN ASSISTANT

## 2025-05-30 PROCEDURE — 87389 HIV-1 AG W/HIV-1&-2 AB AG IA: CPT | Mod: HCNC | Performed by: PHYSICIAN ASSISTANT

## 2025-05-30 PROCEDURE — 63600175 PHARM REV CODE 636 W HCPCS: Mod: HCNC | Performed by: INTERNAL MEDICINE

## 2025-05-30 PROCEDURE — 93010 ELECTROCARDIOGRAM REPORT: CPT | Mod: HCNC,,, | Performed by: INTERNAL MEDICINE

## 2025-05-30 PROCEDURE — 86141 C-REACTIVE PROTEIN HS: CPT | Mod: HCNC | Performed by: INTERNAL MEDICINE

## 2025-05-30 PROCEDURE — 84484 ASSAY OF TROPONIN QUANT: CPT | Mod: HCNC | Performed by: STUDENT IN AN ORGANIZED HEALTH CARE EDUCATION/TRAINING PROGRAM

## 2025-05-30 RX ORDER — CETIRIZINE HYDROCHLORIDE 5 MG/1
10 TABLET ORAL NIGHTLY
Status: DISCONTINUED | OUTPATIENT
Start: 2025-05-31 | End: 2025-05-31

## 2025-05-30 RX ORDER — METOPROLOL SUCCINATE 50 MG/1
100 TABLET, EXTENDED RELEASE ORAL DAILY
Status: DISCONTINUED | OUTPATIENT
Start: 2025-05-31 | End: 2025-06-03 | Stop reason: HOSPADM

## 2025-05-30 RX ORDER — ALBUTEROL SULFATE 90 UG/1
2 INHALANT RESPIRATORY (INHALATION) EVERY 4 HOURS PRN
Status: DISCONTINUED | OUTPATIENT
Start: 2025-05-30 | End: 2025-06-03 | Stop reason: HOSPADM

## 2025-05-30 RX ORDER — FLUTICASONE FUROATE AND VILANTEROL 100; 25 UG/1; UG/1
1 POWDER RESPIRATORY (INHALATION) DAILY
Status: DISCONTINUED | OUTPATIENT
Start: 2025-05-31 | End: 2025-06-03 | Stop reason: HOSPADM

## 2025-05-30 RX ORDER — SODIUM,POTASSIUM PHOSPHATES 280-250MG
2 POWDER IN PACKET (EA) ORAL
Status: DISCONTINUED | OUTPATIENT
Start: 2025-05-30 | End: 2025-06-02

## 2025-05-30 RX ORDER — LANOLIN ALCOHOL/MO/W.PET/CERES
800 CREAM (GRAM) TOPICAL
Status: DISCONTINUED | OUTPATIENT
Start: 2025-05-30 | End: 2025-06-02

## 2025-05-30 RX ORDER — INSULIN ASPART 100 [IU]/ML
0-5 INJECTION, SOLUTION INTRAVENOUS; SUBCUTANEOUS
Status: DISCONTINUED | OUTPATIENT
Start: 2025-05-31 | End: 2025-06-02

## 2025-05-30 RX ORDER — LOSARTAN POTASSIUM 50 MG/1
100 TABLET ORAL DAILY
Status: DISCONTINUED | OUTPATIENT
Start: 2025-05-31 | End: 2025-06-03 | Stop reason: HOSPADM

## 2025-05-30 RX ORDER — ACETAMINOPHEN 325 MG/1
650 TABLET ORAL EVERY 4 HOURS PRN
Status: DISCONTINUED | OUTPATIENT
Start: 2025-05-30 | End: 2025-06-03 | Stop reason: HOSPADM

## 2025-05-30 RX ORDER — SIMETHICONE 80 MG
1 TABLET,CHEWABLE ORAL 4 TIMES DAILY PRN
Status: DISCONTINUED | OUTPATIENT
Start: 2025-05-30 | End: 2025-06-03 | Stop reason: HOSPADM

## 2025-05-30 RX ORDER — GLUCAGON 1 MG
1 KIT INJECTION
Status: DISCONTINUED | OUTPATIENT
Start: 2025-05-30 | End: 2025-06-03 | Stop reason: HOSPADM

## 2025-05-30 RX ORDER — DICLOFENAC SODIUM 10 MG/G
2 GEL TOPICAL 4 TIMES DAILY
Status: DISCONTINUED | OUTPATIENT
Start: 2025-05-31 | End: 2025-06-03 | Stop reason: HOSPADM

## 2025-05-30 RX ORDER — ONDANSETRON HYDROCHLORIDE 2 MG/ML
4 INJECTION, SOLUTION INTRAVENOUS EVERY 8 HOURS PRN
Status: DISCONTINUED | OUTPATIENT
Start: 2025-05-30 | End: 2025-06-03 | Stop reason: HOSPADM

## 2025-05-30 RX ORDER — PANTOPRAZOLE SODIUM 40 MG/1
40 TABLET, DELAYED RELEASE ORAL DAILY
Status: DISCONTINUED | OUTPATIENT
Start: 2025-05-31 | End: 2025-06-03 | Stop reason: HOSPADM

## 2025-05-30 RX ORDER — SODIUM CHLORIDE 0.9 % (FLUSH) 0.9 %
10 SYRINGE (ML) INJECTION EVERY 12 HOURS PRN
Status: DISCONTINUED | OUTPATIENT
Start: 2025-05-30 | End: 2025-06-03 | Stop reason: HOSPADM

## 2025-05-30 RX ORDER — AMLODIPINE BESYLATE 5 MG/1
5 TABLET ORAL DAILY
Status: DISCONTINUED | OUTPATIENT
Start: 2025-05-31 | End: 2025-06-03 | Stop reason: HOSPADM

## 2025-05-30 RX ORDER — NALOXONE HCL 0.4 MG/ML
0.02 VIAL (ML) INJECTION
Status: DISCONTINUED | OUTPATIENT
Start: 2025-05-30 | End: 2025-06-03 | Stop reason: HOSPADM

## 2025-05-30 RX ORDER — INSULIN GLARGINE 100 [IU]/ML
12 INJECTION, SOLUTION SUBCUTANEOUS NIGHTLY
Status: DISCONTINUED | OUTPATIENT
Start: 2025-05-31 | End: 2025-06-02

## 2025-05-30 RX ORDER — IBUPROFEN 200 MG
24 TABLET ORAL
Status: DISCONTINUED | OUTPATIENT
Start: 2025-05-30 | End: 2025-06-03 | Stop reason: HOSPADM

## 2025-05-30 RX ORDER — ALUMINUM HYDROXIDE, MAGNESIUM HYDROXIDE, AND SIMETHICONE 1200; 120; 1200 MG/30ML; MG/30ML; MG/30ML
30 SUSPENSION ORAL 4 TIMES DAILY PRN
Status: DISCONTINUED | OUTPATIENT
Start: 2025-05-30 | End: 2025-06-03 | Stop reason: HOSPADM

## 2025-05-30 RX ORDER — BUPROPION HYDROCHLORIDE 150 MG/1
150 TABLET ORAL DAILY
Status: DISCONTINUED | OUTPATIENT
Start: 2025-05-31 | End: 2025-05-31

## 2025-05-30 RX ORDER — CHOLECALCIFEROL (VITAMIN D3) 25 MCG
2000 TABLET ORAL DAILY
Status: DISCONTINUED | OUTPATIENT
Start: 2025-05-31 | End: 2025-06-03 | Stop reason: HOSPADM

## 2025-05-30 RX ORDER — ATORVASTATIN CALCIUM 10 MG/1
10 TABLET, FILM COATED ORAL NIGHTLY
Status: DISCONTINUED | OUTPATIENT
Start: 2025-05-31 | End: 2025-06-03 | Stop reason: HOSPADM

## 2025-05-30 RX ORDER — SERTRALINE HYDROCHLORIDE 100 MG/1
100 TABLET, FILM COATED ORAL NIGHTLY
Status: DISCONTINUED | OUTPATIENT
Start: 2025-05-31 | End: 2025-06-03 | Stop reason: HOSPADM

## 2025-05-30 RX ORDER — HYDROCODONE BITARTRATE AND ACETAMINOPHEN 5; 325 MG/1; MG/1
1 TABLET ORAL EVERY 6 HOURS PRN
Refills: 0 | Status: DISCONTINUED | OUTPATIENT
Start: 2025-05-30 | End: 2025-06-03 | Stop reason: HOSPADM

## 2025-05-30 RX ORDER — LIDOCAINE 50 MG/G
1 PATCH TOPICAL DAILY
Status: DISCONTINUED | OUTPATIENT
Start: 2025-05-31 | End: 2025-06-03 | Stop reason: HOSPADM

## 2025-05-30 RX ORDER — IBUPROFEN 200 MG
16 TABLET ORAL
Status: DISCONTINUED | OUTPATIENT
Start: 2025-05-30 | End: 2025-06-03 | Stop reason: HOSPADM

## 2025-05-30 RX ORDER — ENOXAPARIN SODIUM 100 MG/ML
40 INJECTION SUBCUTANEOUS EVERY 24 HOURS
Status: DISCONTINUED | OUTPATIENT
Start: 2025-05-30 | End: 2025-05-31

## 2025-05-30 RX ADMIN — ENOXAPARIN SODIUM 40 MG: 40 INJECTION SUBCUTANEOUS at 11:05

## 2025-05-30 NOTE — TELEPHONE ENCOUNTER
"Called pt and pt daughter back to let them know that Dr. Mclain would be giving her a call sometime this afternoon.     Pt also mentioned that she has been having diarrhea the past two days. Denies use of imodium. Informed her it could be r/t to IO and she may need a stool study done to determine cause. Pt expressly stated that she does not want to take any steroids since they "messed with her blood sugar too much."     Pt also noted LE swelling / pitting edema. Per daughter the swelling is worse in R calf vs. Left. Denies swelling elsewhere. Pt has been moving around a lot less recently, spending most of the day in bed d/t arthritis. She relies on her daughter to assist with bringing her to / from bathroom. She states no changes in her medications have been made beside diclofenac gel and norco for pain. Pt to send a picture via portal.  "

## 2025-05-30 NOTE — TELEPHONE ENCOUNTER
----- Message from Val sent at 5/30/2025 10:02 AM CDT -----  Regarding: Urgent Consult/Medical Advisory  Contact: [phone]  Consult/Medical Advisory Name Of Caller: pt esau Johnson   Contact Preference: [phone] Nature of call: Pt daughter is requesting to speak with provider in regards to patient mental health. Please call to advise thank you  ----- Message -----  From: Val Archer  Sent: 5/30/2025  10:04 AM CDT  To: Fabrice TRUJILLO Staff  Subject: Urgent Consult/Medical Advisory                  Consult/Medical Advisory Name Of Caller: pt esau Johnson   Contact Preference: [phone] Nature of call: Pt daughter is requesting to speak with provider in regards to patient mental health. Please call to advise thank you

## 2025-05-30 NOTE — TELEPHONE ENCOUNTER
Phoned patient per team request. Patient with increased stress due to physical symptoms: diarrhea, pain, swelling, difficulty). Taking imodium. Does feel better since this morning. Encouraged ED visit to [facility].No SI. Provided encouragement

## 2025-05-30 NOTE — TELEPHONE ENCOUNTER
"Called patient directly. Pt states she is not suicidal or homicidal and does not have a plan to hurt herself or others. She is "feeling really overwhelmed and tired of fighting." Asked pt if she would be open to seeing one of our psychologists Monday. Pt asked if anything sooner was available. Pt states she would just like a call as she cannot come to clinic today. Reaching out to psych department to try and help coordinate.  "

## 2025-05-30 NOTE — TELEPHONE ENCOUNTER
"Spoke with patients daughter Elizabteh. Elizabeth expressed that she thinks her mom is in a mental health crisis. According to daughter, pt has been experiencing debilitating pain for the past couple weeks d/t arthritis. She has been evaluated in the ED and is now home with her son.     Daughter states the patient told her "I think this is it, I think the arthritis is going to be the end of me. I can't live like this." Daughter states she has not expressively stated she wanted to hurt herself or others and has not had any plans to hurt herself or others. I told her to make sure pt is not left alone by herself and to be sure to call 911 if she does begin to express suicidal thoughts. Elizabeth MARIAN. She does have a referral to onc psych but looks like she never came for a visit. She is on antidepressants but does not see a psychologist or psychiatrist. Psychologists and provider notified, will call pt daughter back with an update.  "

## 2025-05-31 PROBLEM — M79.89 LEFT LEG SWELLING: Status: ACTIVE | Noted: 2025-05-31

## 2025-05-31 PROBLEM — D84.9 IMMUNOSUPPRESSION: Status: ACTIVE | Noted: 2025-05-31

## 2025-05-31 PROBLEM — Z95.810 CARDIAC RESYNCHRONIZATION THERAPY DEFIBRILLATOR (CRT-D) IN PLACE: Status: RESOLVED | Noted: 2019-09-30 | Resolved: 2025-05-31

## 2025-05-31 PROBLEM — Z86.19 HISTORY OF CMV: Status: ACTIVE | Noted: 2025-05-31

## 2025-05-31 PROBLEM — R19.7 ACUTE DIARRHEA: Status: ACTIVE | Noted: 2025-05-31

## 2025-05-31 PROBLEM — Z87.19 HISTORY OF COLITIS: Status: ACTIVE | Noted: 2025-05-31

## 2025-05-31 LAB
ABSOLUTE EOSINOPHIL (OHS): 0.08 K/UL
ABSOLUTE MONOCYTE (OHS): 0.96 K/UL (ref 0.3–1)
ABSOLUTE NEUTROPHIL COUNT (OHS): 6.87 K/UL (ref 1.8–7.7)
ALBUMIN SERPL BCP-MCNC: 2 G/DL (ref 3.5–5.2)
ALP SERPL-CCNC: 78 UNIT/L (ref 40–150)
ALT SERPL W/O P-5'-P-CCNC: 8 UNIT/L (ref 10–44)
ANION GAP (OHS): 11 MMOL/L (ref 8–16)
AST SERPL-CCNC: 9 UNIT/L (ref 11–45)
BASOPHILS # BLD AUTO: 0.05 K/UL
BASOPHILS NFR BLD AUTO: 0.6 %
BILIRUB SERPL-MCNC: 0.3 MG/DL (ref 0.1–1)
BUN SERPL-MCNC: 16 MG/DL (ref 8–23)
CALCIUM SERPL-MCNC: 8.6 MG/DL (ref 8.7–10.5)
CHLORIDE SERPL-SCNC: 103 MMOL/L (ref 95–110)
CK SERPL-CCNC: <7 U/L (ref 20–180)
CO2 SERPL-SCNC: 22 MMOL/L (ref 23–29)
CREAT SERPL-MCNC: 0.8 MG/DL (ref 0.5–1.4)
CRP SERPL-MCNC: 245.97 MG/L
CYCLIC CITRULLINATED PEPTIDE (CCP) (OHS): 3.4 U/ML
ERYTHROCYTE [DISTWIDTH] IN BLOOD BY AUTOMATED COUNT: 16.3 % (ref 11.5–14.5)
ERYTHROCYTE [SEDIMENTATION RATE] IN BLOOD BY PHOTOMETRIC METHOD: 86 MM/HR
GFR SERPLBLD CREATININE-BSD FMLA CKD-EPI: >60 ML/MIN/1.73/M2
GLUCOSE SERPL-MCNC: 140 MG/DL (ref 70–110)
HCT VFR BLD AUTO: 26.8 % (ref 37–48.5)
HGB BLD-MCNC: 7.9 GM/DL (ref 12–16)
IMM GRANULOCYTES # BLD AUTO: 0.16 K/UL (ref 0–0.04)
IMM GRANULOCYTES NFR BLD AUTO: 1.8 % (ref 0–0.5)
LDH SERPL-CCNC: 167 U/L (ref 110–260)
LYMPHOCYTES # BLD AUTO: 0.9 K/UL (ref 1–4.8)
MAGNESIUM SERPL-MCNC: 1.2 MG/DL (ref 1.6–2.6)
MCH RBC QN AUTO: 23.1 PG (ref 27–31)
MCHC RBC AUTO-ENTMCNC: 29.5 G/DL (ref 32–36)
MCV RBC AUTO: 78 FL (ref 82–98)
NUCLEATED RBC (/100WBC) (OHS): 0 /100 WBC
OHS QRS DURATION: 120 MS
OHS QTC CALCULATION: 499 MS
PHOSPHATE SERPL-MCNC: 3.2 MG/DL (ref 2.7–4.5)
PLATELET # BLD AUTO: 310 K/UL (ref 150–450)
PMV BLD AUTO: 11.4 FL (ref 9.2–12.9)
POCT GLUCOSE: 163 MG/DL (ref 70–110)
POCT GLUCOSE: 175 MG/DL (ref 70–110)
POCT GLUCOSE: 180 MG/DL (ref 70–110)
POCT GLUCOSE: 199 MG/DL (ref 70–110)
POTASSIUM SERPL-SCNC: 3.7 MMOL/L (ref 3.5–5.1)
PROT SERPL-MCNC: 6.1 GM/DL (ref 6–8.4)
RBC # BLD AUTO: 3.42 M/UL (ref 4–5.4)
RELATIVE EOSINOPHIL (OHS): 0.9 %
RELATIVE LYMPHOCYTE (OHS): 10 % (ref 18–48)
RELATIVE MONOCYTE (OHS): 10.6 % (ref 4–15)
RELATIVE NEUTROPHIL (OHS): 76.1 % (ref 38–73)
RHEUMATOID FACT SERPL-ACNC: <13 IU/ML
SARS-COV-2 RDRP RESP QL NAA+PROBE: NEGATIVE
SODIUM SERPL-SCNC: 136 MMOL/L (ref 136–145)
WBC # BLD AUTO: 9.02 K/UL (ref 3.9–12.7)

## 2025-05-31 PROCEDURE — 25500020 PHARM REV CODE 255: Mod: HCNC

## 2025-05-31 PROCEDURE — 83735 ASSAY OF MAGNESIUM: CPT | Mod: HCNC | Performed by: INTERNAL MEDICINE

## 2025-05-31 PROCEDURE — 25000003 PHARM REV CODE 250: Mod: HCNC | Performed by: INTERNAL MEDICINE

## 2025-05-31 PROCEDURE — 82085 ASSAY OF ALDOLASE: CPT | Mod: HCNC

## 2025-05-31 PROCEDURE — 63600175 PHARM REV CODE 636 W HCPCS: Mod: HCNC | Performed by: INTERNAL MEDICINE

## 2025-05-31 PROCEDURE — 80053 COMPREHEN METABOLIC PANEL: CPT | Mod: HCNC | Performed by: INTERNAL MEDICINE

## 2025-05-31 PROCEDURE — 84100 ASSAY OF PHOSPHORUS: CPT | Mod: HCNC | Performed by: INTERNAL MEDICINE

## 2025-05-31 PROCEDURE — G0378 HOSPITAL OBSERVATION PER HR: HCPCS | Mod: HCNC

## 2025-05-31 PROCEDURE — 86200 CCP ANTIBODY: CPT | Mod: HCNC

## 2025-05-31 PROCEDURE — 86431 RHEUMATOID FACTOR QUANT: CPT | Mod: HCNC

## 2025-05-31 PROCEDURE — 82728 ASSAY OF FERRITIN: CPT | Mod: HCNC

## 2025-05-31 PROCEDURE — 86038 ANTINUCLEAR ANTIBODIES: CPT | Mod: HCNC

## 2025-05-31 PROCEDURE — 85025 COMPLETE CBC W/AUTO DIFF WBC: CPT | Mod: HCNC | Performed by: INTERNAL MEDICINE

## 2025-05-31 PROCEDURE — 36415 COLL VENOUS BLD VENIPUNCTURE: CPT | Mod: HCNC

## 2025-05-31 PROCEDURE — 25000242 PHARM REV CODE 250 ALT 637 W/ HCPCS: Mod: HCNC | Performed by: INTERNAL MEDICINE

## 2025-05-31 PROCEDURE — 36415 COLL VENOUS BLD VENIPUNCTURE: CPT | Mod: HCNC | Performed by: INTERNAL MEDICINE

## 2025-05-31 PROCEDURE — 25000003 PHARM REV CODE 250: Mod: HCNC

## 2025-05-31 PROCEDURE — 84466 ASSAY OF TRANSFERRIN: CPT | Mod: HCNC

## 2025-05-31 PROCEDURE — 96372 THER/PROPH/DIAG INJ SC/IM: CPT | Performed by: INTERNAL MEDICINE

## 2025-05-31 PROCEDURE — 83615 LACTATE (LD) (LDH) ENZYME: CPT | Mod: HCNC

## 2025-05-31 RX ORDER — CEPHALEXIN 500 MG/1
500 CAPSULE ORAL EVERY 6 HOURS
Status: DISCONTINUED | OUTPATIENT
Start: 2025-05-31 | End: 2025-06-03 | Stop reason: HOSPADM

## 2025-05-31 RX ADMIN — DICLOFENAC SODIUM 2 G: 10 GEL TOPICAL at 11:05

## 2025-05-31 RX ADMIN — CEPHALEXIN 500 MG: 500 CAPSULE ORAL at 04:05

## 2025-05-31 RX ADMIN — POTASSIUM BICARBONATE 50 MEQ: 978 TABLET, EFFERVESCENT ORAL at 09:05

## 2025-05-31 RX ADMIN — FLUTICASONE FUROATE AND VILANTEROL TRIFENATATE 1 PUFF: 100; 25 POWDER RESPIRATORY (INHALATION) at 12:05

## 2025-05-31 RX ADMIN — AMLODIPINE BESYLATE 5 MG: 5 TABLET ORAL at 08:05

## 2025-05-31 RX ADMIN — PANTOPRAZOLE SODIUM 40 MG: 40 TABLET, DELAYED RELEASE ORAL at 08:05

## 2025-05-31 RX ADMIN — Medication 800 MG: at 09:05

## 2025-05-31 RX ADMIN — APIXABAN 5 MG: 5 TABLET, FILM COATED ORAL at 08:05

## 2025-05-31 RX ADMIN — SERTRALINE 100 MG: 100 TABLET, FILM COATED ORAL at 09:05

## 2025-05-31 RX ADMIN — HYDROCODONE BITARTRATE AND ACETAMINOPHEN 1 TABLET: 5; 325 TABLET ORAL at 04:05

## 2025-05-31 RX ADMIN — HYDROCODONE BITARTRATE AND ACETAMINOPHEN 1 TABLET: 5; 325 TABLET ORAL at 02:05

## 2025-05-31 RX ADMIN — APIXABAN 5 MG: 5 TABLET, FILM COATED ORAL at 09:05

## 2025-05-31 RX ADMIN — ATORVASTATIN CALCIUM 10 MG: 10 TABLET, FILM COATED ORAL at 09:05

## 2025-05-31 RX ADMIN — DICLOFENAC SODIUM 2 G: 10 GEL TOPICAL at 04:05

## 2025-05-31 RX ADMIN — THERA TABS 1 TABLET: TAB at 08:05

## 2025-05-31 RX ADMIN — IOHEXOL 15 ML: 350 INJECTION, SOLUTION INTRAVENOUS at 03:05

## 2025-05-31 RX ADMIN — Medication 2000 UNITS: at 08:05

## 2025-05-31 RX ADMIN — Medication 800 MG: at 02:05

## 2025-05-31 RX ADMIN — INSULIN GLARGINE 12 UNITS: 100 INJECTION, SOLUTION SUBCUTANEOUS at 09:05

## 2025-05-31 RX ADMIN — METOPROLOL SUCCINATE 100 MG: 50 TABLET, EXTENDED RELEASE ORAL at 08:05

## 2025-05-31 RX ADMIN — HYDROCODONE BITARTRATE AND ACETAMINOPHEN 1 TABLET: 5; 325 TABLET ORAL at 11:05

## 2025-05-31 RX ADMIN — DICLOFENAC SODIUM 2 G: 10 GEL TOPICAL at 09:05

## 2025-05-31 RX ADMIN — Medication 800 MG: at 04:05

## 2025-05-31 RX ADMIN — CEPHALEXIN 500 MG: 500 CAPSULE ORAL at 11:05

## 2025-05-31 RX ADMIN — LIDOCAINE 1 PATCH: 50 PATCH CUTANEOUS at 08:05

## 2025-05-31 RX ADMIN — LOSARTAN POTASSIUM 100 MG: 50 TABLET, FILM COATED ORAL at 08:05

## 2025-05-31 RX ADMIN — TIOTROPIUM BROMIDE INHALATION SPRAY 2 PUFF: 3.12 SPRAY, METERED RESPIRATORY (INHALATION) at 12:05

## 2025-06-01 LAB
ABSOLUTE EOSINOPHIL (OHS): 0.11 K/UL
ABSOLUTE MONOCYTE (OHS): 1.06 K/UL (ref 0.3–1)
ABSOLUTE NEUTROPHIL COUNT (OHS): 10.26 K/UL (ref 1.8–7.7)
ACID FAST MOD KINY STN SPEC: NORMAL
ALBUMIN SERPL BCP-MCNC: 2.1 G/DL (ref 3.5–5.2)
ALP SERPL-CCNC: 96 UNIT/L (ref 40–150)
ALT SERPL W/O P-5'-P-CCNC: 14 UNIT/L (ref 10–44)
ANION GAP (OHS): 10 MMOL/L (ref 8–16)
APPEARANCE FLD: NORMAL
AST SERPL-CCNC: 18 UNIT/L (ref 11–45)
BASOPHILS # BLD AUTO: 0.07 K/UL
BASOPHILS NFR BLD AUTO: 0.6 %
BILIRUB SERPL-MCNC: 0.3 MG/DL (ref 0.1–1)
BUN SERPL-MCNC: 18 MG/DL (ref 8–23)
CALCIUM SERPL-MCNC: 9 MG/DL (ref 8.7–10.5)
CHLORIDE SERPL-SCNC: 99 MMOL/L (ref 95–110)
CO2 SERPL-SCNC: 24 MMOL/L (ref 23–29)
COLOR FLD: YELLOW
CREAT SERPL-MCNC: 1 MG/DL (ref 0.5–1.4)
CRYSTAL, BODY FLUID (OHS): NORMAL
ERYTHROCYTE [DISTWIDTH] IN BLOOD BY AUTOMATED COUNT: 16.6 % (ref 11.5–14.5)
FERRITIN SERPL-MCNC: 455 NG/ML (ref 20–300)
GFR SERPLBLD CREATININE-BSD FMLA CKD-EPI: >60 ML/MIN/1.73/M2
GLUCOSE SERPL-MCNC: 156 MG/DL (ref 70–110)
GRAM STN SPEC: NORMAL
GRAM STN SPEC: NORMAL
HCT VFR BLD AUTO: 27 % (ref 37–48.5)
HGB BLD-MCNC: 8 GM/DL (ref 12–16)
IMM GRANULOCYTES # BLD AUTO: 0.33 K/UL (ref 0–0.04)
IMM GRANULOCYTES NFR BLD AUTO: 2.6 % (ref 0–0.5)
IRON SATN MFR SERPL: 4 % (ref 20–50)
IRON SERPL-MCNC: 9 UG/DL (ref 30–160)
KOH PREP SPEC: NORMAL
LYMPHOCYTES # BLD AUTO: 0.78 K/UL (ref 1–4.8)
LYMPHOCYTES NFR FLD MANUAL: 1 %
MAGNESIUM SERPL-MCNC: 1.5 MG/DL (ref 1.6–2.6)
MCH RBC QN AUTO: 22.9 PG (ref 27–31)
MCHC RBC AUTO-ENTMCNC: 29.6 G/DL (ref 32–36)
MCV RBC AUTO: 77 FL (ref 82–98)
MONOS+MACROS NFR FLD MANUAL: 8 %
NEUTROPHILS NFR FLD MANUAL: 91 %
NUCLEATED RBC (/100WBC) (OHS): 0 /100 WBC
PATHOLOGIST REVIEW - CRYSTALS BF (OHS): NORMAL
PHOSPHATE SERPL-MCNC: 3.1 MG/DL (ref 2.7–4.5)
PLATELET # BLD AUTO: 351 K/UL (ref 150–450)
PMV BLD AUTO: 11.6 FL (ref 9.2–12.9)
POCT GLUCOSE: 167 MG/DL (ref 70–110)
POCT GLUCOSE: 180 MG/DL (ref 70–110)
POCT GLUCOSE: 312 MG/DL (ref 70–110)
POTASSIUM SERPL-SCNC: 5 MMOL/L (ref 3.5–5.1)
PROT SERPL-MCNC: 6.5 GM/DL (ref 6–8.4)
RBC # BLD AUTO: 3.49 M/UL (ref 4–5.4)
RELATIVE EOSINOPHIL (OHS): 0.9 %
RELATIVE LYMPHOCYTE (OHS): 6.2 % (ref 18–48)
RELATIVE MONOCYTE (OHS): 8.4 % (ref 4–15)
RELATIVE NEUTROPHIL (OHS): 81.3 % (ref 38–73)
SODIUM SERPL-SCNC: 133 MMOL/L (ref 136–145)
TIBC SERPL-MCNC: 222 UG/DL (ref 250–450)
TRANSFERRIN SERPL-MCNC: 150 MG/DL (ref 200–375)
URATE SERPL-MCNC: 6.4 MG/DL (ref 2.4–5.7)
WBC # BLD AUTO: 12.61 K/UL (ref 3.9–12.7)
WBC # FLD: NORMAL /CU MM

## 2025-06-01 PROCEDURE — 84550 ASSAY OF BLOOD/URIC ACID: CPT | Mod: HCNC | Performed by: STUDENT IN AN ORGANIZED HEALTH CARE EDUCATION/TRAINING PROGRAM

## 2025-06-01 PROCEDURE — 63600175 PHARM REV CODE 636 W HCPCS: Mod: HCNC

## 2025-06-01 PROCEDURE — 0S9D3ZZ DRAINAGE OF LEFT KNEE JOINT, PERCUTANEOUS APPROACH: ICD-10-PCS | Performed by: INTERNAL MEDICINE

## 2025-06-01 PROCEDURE — 63600175 PHARM REV CODE 636 W HCPCS: Mod: HCNC | Performed by: INTERNAL MEDICINE

## 2025-06-01 PROCEDURE — 87206 SMEAR FLUORESCENT/ACID STAI: CPT | Mod: HCNC | Performed by: STUDENT IN AN ORGANIZED HEALTH CARE EDUCATION/TRAINING PROGRAM

## 2025-06-01 PROCEDURE — 83735 ASSAY OF MAGNESIUM: CPT | Mod: HCNC | Performed by: INTERNAL MEDICINE

## 2025-06-01 PROCEDURE — 36415 COLL VENOUS BLD VENIPUNCTURE: CPT | Mod: HCNC | Performed by: INTERNAL MEDICINE

## 2025-06-01 PROCEDURE — 89060 EXAM SYNOVIAL FLUID CRYSTALS: CPT | Mod: HCNC | Performed by: STUDENT IN AN ORGANIZED HEALTH CARE EDUCATION/TRAINING PROGRAM

## 2025-06-01 PROCEDURE — 87205 SMEAR GRAM STAIN: CPT | Mod: HCNC | Performed by: STUDENT IN AN ORGANIZED HEALTH CARE EDUCATION/TRAINING PROGRAM

## 2025-06-01 PROCEDURE — 25000003 PHARM REV CODE 250: Mod: HCNC

## 2025-06-01 PROCEDURE — 87116 MYCOBACTERIA CULTURE: CPT | Mod: HCNC | Performed by: STUDENT IN AN ORGANIZED HEALTH CARE EDUCATION/TRAINING PROGRAM

## 2025-06-01 PROCEDURE — 94640 AIRWAY INHALATION TREATMENT: CPT | Mod: HCNC

## 2025-06-01 PROCEDURE — 25000003 PHARM REV CODE 250: Mod: HCNC | Performed by: INTERNAL MEDICINE

## 2025-06-01 PROCEDURE — 27000207 HC ISOLATION: Mod: HCNC

## 2025-06-01 PROCEDURE — 20600001 HC STEP DOWN PRIVATE ROOM: Mod: HCNC

## 2025-06-01 PROCEDURE — 99223 1ST HOSP IP/OBS HIGH 75: CPT | Mod: HCNC,GC,, | Performed by: INTERNAL MEDICINE

## 2025-06-01 PROCEDURE — 99233 SBSQ HOSP IP/OBS HIGH 50: CPT | Mod: HCNC,GC,, | Performed by: STUDENT IN AN ORGANIZED HEALTH CARE EDUCATION/TRAINING PROGRAM

## 2025-06-01 PROCEDURE — 84100 ASSAY OF PHOSPHORUS: CPT | Mod: HCNC | Performed by: INTERNAL MEDICINE

## 2025-06-01 PROCEDURE — 82040 ASSAY OF SERUM ALBUMIN: CPT | Mod: HCNC | Performed by: INTERNAL MEDICINE

## 2025-06-01 PROCEDURE — 94761 N-INVAS EAR/PLS OXIMETRY MLT: CPT | Mod: HCNC

## 2025-06-01 PROCEDURE — 87210 SMEAR WET MOUNT SALINE/INK: CPT | Mod: HCNC | Performed by: STUDENT IN AN ORGANIZED HEALTH CARE EDUCATION/TRAINING PROGRAM

## 2025-06-01 PROCEDURE — 89051 BODY FLUID CELL COUNT: CPT | Mod: HCNC | Performed by: STUDENT IN AN ORGANIZED HEALTH CARE EDUCATION/TRAINING PROGRAM

## 2025-06-01 PROCEDURE — 87070 CULTURE OTHR SPECIMN AEROBIC: CPT | Mod: HCNC | Performed by: STUDENT IN AN ORGANIZED HEALTH CARE EDUCATION/TRAINING PROGRAM

## 2025-06-01 PROCEDURE — 85025 COMPLETE CBC W/AUTO DIFF WBC: CPT | Mod: HCNC | Performed by: INTERNAL MEDICINE

## 2025-06-01 PROCEDURE — 87102 FUNGUS ISOLATION CULTURE: CPT | Mod: HCNC | Performed by: STUDENT IN AN ORGANIZED HEALTH CARE EDUCATION/TRAINING PROGRAM

## 2025-06-01 RX ORDER — PREDNISONE 20 MG/1
40 TABLET ORAL DAILY
Status: DISCONTINUED | OUTPATIENT
Start: 2025-06-01 | End: 2025-06-03 | Stop reason: HOSPADM

## 2025-06-01 RX ORDER — METHOCARBAMOL 750 MG/1
1500 TABLET, FILM COATED ORAL 3 TIMES DAILY
Status: DISCONTINUED | OUTPATIENT
Start: 2025-06-01 | End: 2025-06-03 | Stop reason: HOSPADM

## 2025-06-01 RX ORDER — SODIUM CHLORIDE, SODIUM LACTATE, POTASSIUM CHLORIDE, CALCIUM CHLORIDE 600; 310; 30; 20 MG/100ML; MG/100ML; MG/100ML; MG/100ML
INJECTION, SOLUTION INTRAVENOUS CONTINUOUS
Status: ACTIVE | OUTPATIENT
Start: 2025-06-01 | End: 2025-06-02

## 2025-06-01 RX ADMIN — METHOCARBAMOL 1500 MG: 750 TABLET ORAL at 09:06

## 2025-06-01 RX ADMIN — LIDOCAINE 1 PATCH: 50 PATCH CUTANEOUS at 08:06

## 2025-06-01 RX ADMIN — DICLOFENAC SODIUM 2 G: 10 GEL TOPICAL at 09:06

## 2025-06-01 RX ADMIN — INSULIN ASPART 2 UNITS: 100 INJECTION, SOLUTION INTRAVENOUS; SUBCUTANEOUS at 12:06

## 2025-06-01 RX ADMIN — PANTOPRAZOLE SODIUM 40 MG: 40 TABLET, DELAYED RELEASE ORAL at 08:06

## 2025-06-01 RX ADMIN — Medication 800 MG: at 12:06

## 2025-06-01 RX ADMIN — DICLOFENAC SODIUM 2 G: 10 GEL TOPICAL at 05:06

## 2025-06-01 RX ADMIN — CEPHALEXIN 500 MG: 500 CAPSULE ORAL at 05:06

## 2025-06-01 RX ADMIN — DICLOFENAC SODIUM 2 G: 10 GEL TOPICAL at 08:06

## 2025-06-01 RX ADMIN — LOSARTAN POTASSIUM 100 MG: 50 TABLET, FILM COATED ORAL at 08:06

## 2025-06-01 RX ADMIN — METOPROLOL SUCCINATE 100 MG: 50 TABLET, EXTENDED RELEASE ORAL at 08:06

## 2025-06-01 RX ADMIN — ATORVASTATIN CALCIUM 10 MG: 10 TABLET, FILM COATED ORAL at 09:06

## 2025-06-01 RX ADMIN — FLUTICASONE FUROATE AND VILANTEROL TRIFENATATE 1 PUFF: 100; 25 POWDER RESPIRATORY (INHALATION) at 11:06

## 2025-06-01 RX ADMIN — SODIUM CHLORIDE, POTASSIUM CHLORIDE, SODIUM LACTATE AND CALCIUM CHLORIDE: 600; 310; 30; 20 INJECTION, SOLUTION INTRAVENOUS at 12:06

## 2025-06-01 RX ADMIN — HYDROCODONE BITARTRATE AND ACETAMINOPHEN 1 TABLET: 5; 325 TABLET ORAL at 11:06

## 2025-06-01 RX ADMIN — Medication 800 MG: at 08:06

## 2025-06-01 RX ADMIN — Medication 2000 UNITS: at 08:06

## 2025-06-01 RX ADMIN — HYDROCODONE BITARTRATE AND ACETAMINOPHEN 1 TABLET: 5; 325 TABLET ORAL at 07:06

## 2025-06-01 RX ADMIN — APIXABAN 5 MG: 5 TABLET, FILM COATED ORAL at 08:06

## 2025-06-01 RX ADMIN — CEPHALEXIN 500 MG: 500 CAPSULE ORAL at 12:06

## 2025-06-01 RX ADMIN — CEPHALEXIN 500 MG: 500 CAPSULE ORAL at 11:06

## 2025-06-01 RX ADMIN — PREDNISONE 40 MG: 20 TABLET ORAL at 03:06

## 2025-06-01 RX ADMIN — METHOCARBAMOL 1500 MG: 750 TABLET ORAL at 03:06

## 2025-06-01 RX ADMIN — APIXABAN 5 MG: 5 TABLET, FILM COATED ORAL at 09:06

## 2025-06-01 RX ADMIN — SERTRALINE 100 MG: 100 TABLET, FILM COATED ORAL at 09:06

## 2025-06-01 RX ADMIN — THERA TABS 1 TABLET: TAB at 08:06

## 2025-06-01 RX ADMIN — INSULIN GLARGINE 12 UNITS: 100 INJECTION, SOLUTION SUBCUTANEOUS at 09:06

## 2025-06-01 RX ADMIN — HYDROCODONE BITARTRATE AND ACETAMINOPHEN 1 TABLET: 5; 325 TABLET ORAL at 05:06

## 2025-06-01 RX ADMIN — TIOTROPIUM BROMIDE INHALATION SPRAY 2 PUFF: 3.12 SPRAY, METERED RESPIRATORY (INHALATION) at 11:06

## 2025-06-01 RX ADMIN — AMLODIPINE BESYLATE 5 MG: 5 TABLET ORAL at 08:06

## 2025-06-01 RX ADMIN — INSULIN ASPART 2 UNITS: 100 INJECTION, SOLUTION INTRAVENOUS; SUBCUTANEOUS at 09:06

## 2025-06-02 PROBLEM — T38.0X5A ADRENAL CORTICOSTEROID CAUSING ADVERSE EFFECT IN THERAPEUTIC USE: Status: ACTIVE | Noted: 2025-06-02

## 2025-06-02 PROBLEM — E87.5 HYPERKALEMIA: Status: ACTIVE | Noted: 2025-06-02

## 2025-06-02 PROBLEM — D68.59 HYPERCOAGULOPATHY: Status: ACTIVE | Noted: 2025-06-02

## 2025-06-02 PROBLEM — F32.A DEPRESSION: Status: ACTIVE | Noted: 2020-09-04

## 2025-06-02 PROBLEM — E87.1 HYPONATREMIA: Status: ACTIVE | Noted: 2025-06-02

## 2025-06-02 LAB
ABSOLUTE EOSINOPHIL (OHS): 0 K/UL
ABSOLUTE MONOCYTE (OHS): 0.43 K/UL (ref 0.3–1)
ABSOLUTE NEUTROPHIL COUNT (OHS): 9.18 K/UL (ref 1.8–7.7)
ACID FAST MOD KINY STN SPEC: NORMAL
ALBUMIN SERPL BCP-MCNC: 2 G/DL (ref 3.5–5.2)
ALP SERPL-CCNC: 100 UNIT/L (ref 40–150)
ALT SERPL W/O P-5'-P-CCNC: 15 UNIT/L (ref 10–44)
ANA (OHS): NORMAL
ANION GAP (OHS): 10 MMOL/L (ref 8–16)
ANION GAP (OHS): 10 MMOL/L (ref 8–16)
ANION GAP (OHS): 9 MMOL/L (ref 8–16)
ANION GAP (OHS): 9 MMOL/L (ref 8–16)
AST SERPL-CCNC: 17 UNIT/L (ref 11–45)
BASOPHILS # BLD AUTO: 0.05 K/UL
BASOPHILS NFR BLD AUTO: 0.5 %
BILIRUB SERPL-MCNC: 0.2 MG/DL (ref 0.1–1)
BUN SERPL-MCNC: 26 MG/DL (ref 8–23)
BUN SERPL-MCNC: 27 MG/DL (ref 8–23)
BUN SERPL-MCNC: 28 MG/DL (ref 8–23)
BUN SERPL-MCNC: 28 MG/DL (ref 8–23)
CALCIUM SERPL-MCNC: 9.2 MG/DL (ref 8.7–10.5)
CALCIUM SERPL-MCNC: 9.2 MG/DL (ref 8.7–10.5)
CALCIUM SERPL-MCNC: 9.3 MG/DL (ref 8.7–10.5)
CALCIUM SERPL-MCNC: 9.7 MG/DL (ref 8.7–10.5)
CHLORIDE SERPL-SCNC: 100 MMOL/L (ref 95–110)
CHLORIDE SERPL-SCNC: 100 MMOL/L (ref 95–110)
CHLORIDE SERPL-SCNC: 99 MMOL/L (ref 95–110)
CHLORIDE SERPL-SCNC: 99 MMOL/L (ref 95–110)
CO2 SERPL-SCNC: 23 MMOL/L (ref 23–29)
CO2 SERPL-SCNC: 24 MMOL/L (ref 23–29)
CO2 SERPL-SCNC: 25 MMOL/L (ref 23–29)
CO2 SERPL-SCNC: 27 MMOL/L (ref 23–29)
CREAT SERPL-MCNC: 0.9 MG/DL (ref 0.5–1.4)
CREAT SERPL-MCNC: 1 MG/DL (ref 0.5–1.4)
CREAT SERPL-MCNC: 1 MG/DL (ref 0.5–1.4)
CREAT SERPL-MCNC: 1.2 MG/DL (ref 0.5–1.4)
ERYTHROCYTE [DISTWIDTH] IN BLOOD BY AUTOMATED COUNT: 16.7 % (ref 11.5–14.5)
FUNGUS SPEC CULT: NORMAL
GFR SERPLBLD CREATININE-BSD FMLA CKD-EPI: 50 ML/MIN/1.73/M2
GFR SERPLBLD CREATININE-BSD FMLA CKD-EPI: >60 ML/MIN/1.73/M2
GLUCOSE SERPL-MCNC: 226 MG/DL (ref 70–110)
GLUCOSE SERPL-MCNC: 242 MG/DL (ref 70–110)
GLUCOSE SERPL-MCNC: 253 MG/DL (ref 70–110)
GLUCOSE SERPL-MCNC: 355 MG/DL (ref 70–110)
HBV CORE AB SERPL QL IA: NORMAL
HBV SURFACE AB SER-ACNC: <3 MIU/ML
HBV SURFACE AB SERPL IA-ACNC: NORMAL M[IU]/ML
HBV SURFACE AG SERPL QL IA: NORMAL
HCT VFR BLD AUTO: 29.7 % (ref 37–48.5)
HGB BLD-MCNC: 8.2 GM/DL (ref 12–16)
IMM GRANULOCYTES # BLD AUTO: 0.26 K/UL (ref 0–0.04)
IMM GRANULOCYTES NFR BLD AUTO: 2.5 % (ref 0–0.5)
LYMPHOCYTES # BLD AUTO: 0.52 K/UL (ref 1–4.8)
MAGNESIUM SERPL-MCNC: 1.9 MG/DL (ref 1.6–2.6)
MCH RBC QN AUTO: 23.2 PG (ref 27–31)
MCHC RBC AUTO-ENTMCNC: 27.6 G/DL (ref 32–36)
MCV RBC AUTO: 84 FL (ref 82–98)
NUCLEATED RBC (/100WBC) (OHS): 0 /100 WBC
PHOSPHATE SERPL-MCNC: 4.3 MG/DL (ref 2.7–4.5)
PLATELET # BLD AUTO: 324 K/UL (ref 150–450)
PMV BLD AUTO: 11.7 FL (ref 9.2–12.9)
POCT GLUCOSE: 206 MG/DL (ref 70–110)
POCT GLUCOSE: 231 MG/DL (ref 70–110)
POCT GLUCOSE: 237 MG/DL (ref 70–110)
POCT GLUCOSE: 257 MG/DL (ref 70–110)
POCT GLUCOSE: 258 MG/DL (ref 70–110)
POCT GLUCOSE: 262 MG/DL (ref 70–110)
POCT GLUCOSE: 320 MG/DL (ref 70–110)
POCT GLUCOSE: 345 MG/DL (ref 70–110)
POCT GLUCOSE: 349 MG/DL (ref 70–110)
POCT GLUCOSE: 395 MG/DL (ref 70–110)
POCT GLUCOSE: 453 MG/DL (ref 70–110)
POTASSIUM SERPL-SCNC: 4.9 MMOL/L (ref 3.5–5.1)
POTASSIUM SERPL-SCNC: 4.9 MMOL/L (ref 3.5–5.1)
POTASSIUM SERPL-SCNC: 5.2 MMOL/L (ref 3.5–5.1)
POTASSIUM SERPL-SCNC: 6 MMOL/L (ref 3.5–5.1)
PROT SERPL-MCNC: 6.7 GM/DL (ref 6–8.4)
PTH-INTACT SERPL-MCNC: 53.8 PG/ML (ref 9–77)
RBC # BLD AUTO: 3.54 M/UL (ref 4–5.4)
RELATIVE EOSINOPHIL (OHS): 0 %
RELATIVE LYMPHOCYTE (OHS): 5 % (ref 18–48)
RELATIVE MONOCYTE (OHS): 4.1 % (ref 4–15)
RELATIVE NEUTROPHIL (OHS): 87.9 % (ref 38–73)
SODIUM SERPL-SCNC: 133 MMOL/L (ref 136–145)
SODIUM SERPL-SCNC: 133 MMOL/L (ref 136–145)
SODIUM SERPL-SCNC: 134 MMOL/L (ref 136–145)
SODIUM SERPL-SCNC: 135 MMOL/L (ref 136–145)
WBC # BLD AUTO: 10.44 K/UL (ref 3.9–12.7)

## 2025-06-02 PROCEDURE — 36415 COLL VENOUS BLD VENIPUNCTURE: CPT | Mod: HCNC | Performed by: STUDENT IN AN ORGANIZED HEALTH CARE EDUCATION/TRAINING PROGRAM

## 2025-06-02 PROCEDURE — 86480 TB TEST CELL IMMUN MEASURE: CPT | Mod: HCNC | Performed by: STUDENT IN AN ORGANIZED HEALTH CARE EDUCATION/TRAINING PROGRAM

## 2025-06-02 PROCEDURE — 27000207 HC ISOLATION: Mod: HCNC

## 2025-06-02 PROCEDURE — 83970 ASSAY OF PARATHORMONE: CPT | Mod: HCNC | Performed by: STUDENT IN AN ORGANIZED HEALTH CARE EDUCATION/TRAINING PROGRAM

## 2025-06-02 PROCEDURE — 80053 COMPREHEN METABOLIC PANEL: CPT | Mod: HCNC | Performed by: INTERNAL MEDICINE

## 2025-06-02 PROCEDURE — 99222 1ST HOSP IP/OBS MODERATE 55: CPT | Mod: HCNC,,, | Performed by: NURSE PRACTITIONER

## 2025-06-02 PROCEDURE — 25000003 PHARM REV CODE 250: Mod: HCNC

## 2025-06-02 PROCEDURE — 83735 ASSAY OF MAGNESIUM: CPT | Mod: HCNC | Performed by: INTERNAL MEDICINE

## 2025-06-02 PROCEDURE — 36415 COLL VENOUS BLD VENIPUNCTURE: CPT | Mod: HCNC

## 2025-06-02 PROCEDURE — 84100 ASSAY OF PHOSPHORUS: CPT | Mod: HCNC | Performed by: INTERNAL MEDICINE

## 2025-06-02 PROCEDURE — 63600175 PHARM REV CODE 636 W HCPCS: Mod: JZ,TB,HCNC

## 2025-06-02 PROCEDURE — 63600175 PHARM REV CODE 636 W HCPCS: Mod: HCNC

## 2025-06-02 PROCEDURE — 86704 HEP B CORE ANTIBODY TOTAL: CPT | Mod: HCNC | Performed by: STUDENT IN AN ORGANIZED HEALTH CARE EDUCATION/TRAINING PROGRAM

## 2025-06-02 PROCEDURE — 86706 HEP B SURFACE ANTIBODY: CPT | Mod: HCNC | Performed by: STUDENT IN AN ORGANIZED HEALTH CARE EDUCATION/TRAINING PROGRAM

## 2025-06-02 PROCEDURE — 87340 HEPATITIS B SURFACE AG IA: CPT | Mod: HCNC | Performed by: STUDENT IN AN ORGANIZED HEALTH CARE EDUCATION/TRAINING PROGRAM

## 2025-06-02 PROCEDURE — 85025 COMPLETE CBC W/AUTO DIFF WBC: CPT | Mod: HCNC | Performed by: INTERNAL MEDICINE

## 2025-06-02 PROCEDURE — 82310 ASSAY OF CALCIUM: CPT | Mod: HCNC

## 2025-06-02 PROCEDURE — 25000003 PHARM REV CODE 250: Mod: HCNC | Performed by: INTERNAL MEDICINE

## 2025-06-02 PROCEDURE — 99233 SBSQ HOSP IP/OBS HIGH 50: CPT | Mod: HCNC,GC,, | Performed by: STUDENT IN AN ORGANIZED HEALTH CARE EDUCATION/TRAINING PROGRAM

## 2025-06-02 PROCEDURE — 90834 PSYTX W PT 45 MINUTES: CPT | Mod: HCNC,,, | Performed by: PSYCHOLOGIST

## 2025-06-02 PROCEDURE — 20600001 HC STEP DOWN PRIVATE ROOM: Mod: HCNC

## 2025-06-02 PROCEDURE — 99232 SBSQ HOSP IP/OBS MODERATE 35: CPT | Mod: HCNC,GC,, | Performed by: INTERNAL MEDICINE

## 2025-06-02 RX ORDER — CALCIUM GLUCONATE 20 MG/ML
1 INJECTION, SOLUTION INTRAVENOUS ONCE
Status: COMPLETED | OUTPATIENT
Start: 2025-06-02 | End: 2025-06-02

## 2025-06-02 RX ORDER — INSULIN ASPART 100 [IU]/ML
4 INJECTION, SOLUTION INTRAVENOUS; SUBCUTANEOUS
Status: DISCONTINUED | OUTPATIENT
Start: 2025-06-03 | End: 2025-06-03

## 2025-06-02 RX ORDER — INSULIN GLARGINE 100 [IU]/ML
16 INJECTION, SOLUTION SUBCUTANEOUS NIGHTLY
Status: DISCONTINUED | OUTPATIENT
Start: 2025-06-02 | End: 2025-06-02

## 2025-06-02 RX ORDER — INSULIN ASPART 100 [IU]/ML
0-10 INJECTION, SOLUTION INTRAVENOUS; SUBCUTANEOUS
Status: DISCONTINUED | OUTPATIENT
Start: 2025-06-02 | End: 2025-06-03 | Stop reason: HOSPADM

## 2025-06-02 RX ORDER — INSULIN GLARGINE 100 [IU]/ML
15 INJECTION, SOLUTION SUBCUTANEOUS NIGHTLY
Status: DISCONTINUED | OUTPATIENT
Start: 2025-06-02 | End: 2025-06-03 | Stop reason: HOSPADM

## 2025-06-02 RX ORDER — BUPROPION HYDROCHLORIDE 150 MG/1
150 TABLET ORAL DAILY
Status: DISCONTINUED | OUTPATIENT
Start: 2025-06-02 | End: 2025-06-03 | Stop reason: HOSPADM

## 2025-06-02 RX ORDER — CALCIUM GLUCONATE 20 MG/ML
1 INJECTION, SOLUTION INTRAVENOUS EVERY 10 MIN PRN
Status: DISCONTINUED | OUTPATIENT
Start: 2025-06-02 | End: 2025-06-03

## 2025-06-02 RX ADMIN — METHOCARBAMOL 1500 MG: 750 TABLET ORAL at 03:06

## 2025-06-02 RX ADMIN — INSULIN HUMAN 10 UNITS: 100 INJECTION, SOLUTION PARENTERAL at 10:06

## 2025-06-02 RX ADMIN — FLUTICASONE FUROATE AND VILANTEROL TRIFENATATE 1 PUFF: 100; 25 POWDER RESPIRATORY (INHALATION) at 09:06

## 2025-06-02 RX ADMIN — ATORVASTATIN CALCIUM 10 MG: 10 TABLET, FILM COATED ORAL at 08:06

## 2025-06-02 RX ADMIN — CEPHALEXIN 500 MG: 500 CAPSULE ORAL at 05:06

## 2025-06-02 RX ADMIN — HYDROCODONE BITARTRATE AND ACETAMINOPHEN 1 TABLET: 5; 325 TABLET ORAL at 12:06

## 2025-06-02 RX ADMIN — APIXABAN 5 MG: 5 TABLET, FILM COATED ORAL at 08:06

## 2025-06-02 RX ADMIN — LOSARTAN POTASSIUM 100 MG: 50 TABLET, FILM COATED ORAL at 09:06

## 2025-06-02 RX ADMIN — TIOTROPIUM BROMIDE INHALATION SPRAY 2 PUFF: 3.12 SPRAY, METERED RESPIRATORY (INHALATION) at 09:06

## 2025-06-02 RX ADMIN — DICLOFENAC SODIUM 2 G: 10 GEL TOPICAL at 01:06

## 2025-06-02 RX ADMIN — LIDOCAINE 1 PATCH: 50 PATCH CUTANEOUS at 09:06

## 2025-06-02 RX ADMIN — DICLOFENAC SODIUM 2 G: 10 GEL TOPICAL at 05:06

## 2025-06-02 RX ADMIN — INSULIN ASPART 2 UNITS: 100 INJECTION, SOLUTION INTRAVENOUS; SUBCUTANEOUS at 06:06

## 2025-06-02 RX ADMIN — METHOCARBAMOL 1500 MG: 750 TABLET ORAL at 09:06

## 2025-06-02 RX ADMIN — DICLOFENAC SODIUM 2 G: 10 GEL TOPICAL at 09:06

## 2025-06-02 RX ADMIN — CEPHALEXIN 500 MG: 500 CAPSULE ORAL at 12:06

## 2025-06-02 RX ADMIN — METOPROLOL SUCCINATE 100 MG: 50 TABLET, EXTENDED RELEASE ORAL at 09:06

## 2025-06-02 RX ADMIN — Medication 2000 UNITS: at 09:06

## 2025-06-02 RX ADMIN — DEXTROSE MONOHYDRATE 50 G: 25 INJECTION, SOLUTION INTRAVENOUS at 10:06

## 2025-06-02 RX ADMIN — METHOCARBAMOL 1500 MG: 750 TABLET ORAL at 08:06

## 2025-06-02 RX ADMIN — PANTOPRAZOLE SODIUM 40 MG: 40 TABLET, DELAYED RELEASE ORAL at 09:06

## 2025-06-02 RX ADMIN — THERA TABS 1 TABLET: TAB at 09:06

## 2025-06-02 RX ADMIN — INSULIN ASPART 4 UNITS: 100 INJECTION, SOLUTION INTRAVENOUS; SUBCUTANEOUS at 01:06

## 2025-06-02 RX ADMIN — INSULIN GLARGINE 15 UNITS: 100 INJECTION, SOLUTION SUBCUTANEOUS at 08:06

## 2025-06-02 RX ADMIN — SERTRALINE 100 MG: 100 TABLET, FILM COATED ORAL at 08:06

## 2025-06-02 RX ADMIN — CALCIUM GLUCONATE 1 G: 20 INJECTION, SOLUTION INTRAVENOUS at 10:06

## 2025-06-02 RX ADMIN — AMLODIPINE BESYLATE 5 MG: 5 TABLET ORAL at 09:06

## 2025-06-02 RX ADMIN — INSULIN ASPART 1 UNITS: 100 INJECTION, SOLUTION INTRAVENOUS; SUBCUTANEOUS at 08:06

## 2025-06-02 RX ADMIN — APIXABAN 5 MG: 5 TABLET, FILM COATED ORAL at 09:06

## 2025-06-02 RX ADMIN — FERUMOXYTOL 1020 MG: 510 INJECTION INTRAVENOUS at 12:06

## 2025-06-02 RX ADMIN — CEPHALEXIN 500 MG: 500 CAPSULE ORAL at 06:06

## 2025-06-02 RX ADMIN — BUPROPION HYDROCHLORIDE 150 MG: 150 TABLET, FILM COATED, EXTENDED RELEASE ORAL at 10:06

## 2025-06-02 RX ADMIN — PREDNISONE 40 MG: 20 TABLET ORAL at 09:06

## 2025-06-02 RX ADMIN — CEPHALEXIN 500 MG: 500 CAPSULE ORAL at 11:06

## 2025-06-02 RX ADMIN — SODIUM ZIRCONIUM CYCLOSILICATE 10 G: 10 POWDER, FOR SUSPENSION ORAL at 07:06

## 2025-06-03 ENCOUNTER — PATIENT MESSAGE (OUTPATIENT)
Dept: ENDOCRINOLOGY | Facility: HOSPITAL | Age: 70
End: 2025-06-03
Payer: MEDICARE

## 2025-06-03 VITALS
OXYGEN SATURATION: 96 % | HEIGHT: 62 IN | DIASTOLIC BLOOD PRESSURE: 64 MMHG | BODY MASS INDEX: 31.28 KG/M2 | HEART RATE: 88 BPM | TEMPERATURE: 98 F | RESPIRATION RATE: 20 BRPM | WEIGHT: 170 LBS | SYSTOLIC BLOOD PRESSURE: 130 MMHG

## 2025-06-03 LAB
ABSOLUTE EOSINOPHIL (OHS): 0 K/UL
ABSOLUTE MONOCYTE (OHS): 0.64 K/UL (ref 0.3–1)
ABSOLUTE NEUTROPHIL COUNT (OHS): 8.33 K/UL (ref 1.8–7.7)
ALBUMIN SERPL BCP-MCNC: 1.9 G/DL (ref 3.5–5.2)
ALDOLASE (OHS): 5.2 U/L (ref 1.2–7.6)
ALP SERPL-CCNC: 84 UNIT/L (ref 40–150)
ALT SERPL W/O P-5'-P-CCNC: 21 UNIT/L (ref 10–44)
ANION GAP (OHS): 11 MMOL/L (ref 8–16)
AST SERPL-CCNC: 28 UNIT/L (ref 11–45)
BASOPHILS # BLD AUTO: 0.03 K/UL
BASOPHILS NFR BLD AUTO: 0.3 %
BILIRUB SERPL-MCNC: 0.1 MG/DL (ref 0.1–1)
BUN SERPL-MCNC: 28 MG/DL (ref 8–23)
CALCIUM SERPL-MCNC: 9.4 MG/DL (ref 8.7–10.5)
CHLORIDE SERPL-SCNC: 102 MMOL/L (ref 95–110)
CO2 SERPL-SCNC: 23 MMOL/L (ref 23–29)
CREAT SERPL-MCNC: 0.9 MG/DL (ref 0.5–1.4)
ERYTHROCYTE [DISTWIDTH] IN BLOOD BY AUTOMATED COUNT: 16.3 % (ref 11.5–14.5)
GFR SERPLBLD CREATININE-BSD FMLA CKD-EPI: >60 ML/MIN/1.73/M2
GLUCOSE SERPL-MCNC: 276 MG/DL (ref 70–110)
HCT VFR BLD AUTO: 25.1 % (ref 37–48.5)
HGB BLD-MCNC: 7.4 GM/DL (ref 12–16)
IMM GRANULOCYTES # BLD AUTO: 0.23 K/UL (ref 0–0.04)
IMM GRANULOCYTES NFR BLD AUTO: 2.3 % (ref 0–0.5)
LYMPHOCYTES # BLD AUTO: 0.71 K/UL (ref 1–4.8)
MAGNESIUM SERPL-MCNC: 1.7 MG/DL (ref 1.6–2.6)
MCH RBC QN AUTO: 23.1 PG (ref 27–31)
MCHC RBC AUTO-ENTMCNC: 29.5 G/DL (ref 32–36)
MCV RBC AUTO: 78 FL (ref 82–98)
MITOGEN MINUS NIL (OHS): 0.06
NIL TB SYNCED (OHS): 0
NUCLEATED RBC (/100WBC) (OHS): 0 /100 WBC
OB PNL STL: NEGATIVE
PHOSPHATE SERPL-MCNC: 3.2 MG/DL (ref 2.7–4.5)
PLATELET # BLD AUTO: 383 K/UL (ref 150–450)
PMV BLD AUTO: 11.4 FL (ref 9.2–12.9)
POCT GLUCOSE: 131 MG/DL (ref 70–110)
POCT GLUCOSE: 206 MG/DL (ref 70–110)
POCT GLUCOSE: 282 MG/DL (ref 70–110)
POTASSIUM SERPL-SCNC: 5.4 MMOL/L (ref 3.5–5.1)
PROT SERPL-MCNC: 6.6 GM/DL (ref 6–8.4)
QUANTIFERON GOLD INTERP (OHS): ABNORMAL
RBC # BLD AUTO: 3.21 M/UL (ref 4–5.4)
RELATIVE EOSINOPHIL (OHS): 0 %
RELATIVE LYMPHOCYTE (OHS): 7.1 % (ref 18–48)
RELATIVE MONOCYTE (OHS): 6.4 % (ref 4–15)
RELATIVE NEUTROPHIL (OHS): 83.9 % (ref 38–73)
SODIUM SERPL-SCNC: 136 MMOL/L (ref 136–145)
TB1 AG MINUS NIL (OHS): <0
TB2 AG MINUS NIL (OHS): <0
WBC # BLD AUTO: 9.94 K/UL (ref 3.9–12.7)
WBC #/AREA STL HPF: NORMAL /[HPF]

## 2025-06-03 PROCEDURE — 94640 AIRWAY INHALATION TREATMENT: CPT | Mod: HCNC

## 2025-06-03 PROCEDURE — 94761 N-INVAS EAR/PLS OXIMETRY MLT: CPT | Mod: HCNC

## 2025-06-03 PROCEDURE — 63600175 PHARM REV CODE 636 W HCPCS: Mod: HCNC

## 2025-06-03 PROCEDURE — 99232 SBSQ HOSP IP/OBS MODERATE 35: CPT | Mod: HCNC,GC,, | Performed by: INTERNAL MEDICINE

## 2025-06-03 PROCEDURE — 84100 ASSAY OF PHOSPHORUS: CPT | Mod: HCNC

## 2025-06-03 PROCEDURE — 85025 COMPLETE CBC W/AUTO DIFF WBC: CPT | Mod: HCNC

## 2025-06-03 PROCEDURE — 25000003 PHARM REV CODE 250: Mod: HCNC

## 2025-06-03 PROCEDURE — 25000003 PHARM REV CODE 250: Mod: HCNC | Performed by: INTERNAL MEDICINE

## 2025-06-03 PROCEDURE — 99232 SBSQ HOSP IP/OBS MODERATE 35: CPT | Mod: HCNC,,,

## 2025-06-03 PROCEDURE — 87427 SHIGA-LIKE TOXIN AG IA: CPT | Mod: HCNC

## 2025-06-03 PROCEDURE — 82272 OCCULT BLD FECES 1-3 TESTS: CPT | Mod: HCNC

## 2025-06-03 PROCEDURE — 87425 ROTAVIRUS AG IA: CPT | Mod: HCNC

## 2025-06-03 PROCEDURE — 87046 STOOL CULTR AEROBIC BACT EA: CPT | Mod: HCNC

## 2025-06-03 PROCEDURE — 86481 TB AG RESPONSE T-CELL SUSP: CPT | Mod: HCNC | Performed by: STUDENT IN AN ORGANIZED HEALTH CARE EDUCATION/TRAINING PROGRAM

## 2025-06-03 PROCEDURE — 82705 FATS/LIPIDS FECES QUAL: CPT | Mod: HCNC

## 2025-06-03 PROCEDURE — 63600175 PHARM REV CODE 636 W HCPCS: Mod: HCNC | Performed by: NURSE PRACTITIONER

## 2025-06-03 PROCEDURE — 36415 COLL VENOUS BLD VENIPUNCTURE: CPT | Mod: HCNC

## 2025-06-03 PROCEDURE — 83735 ASSAY OF MAGNESIUM: CPT | Mod: HCNC

## 2025-06-03 PROCEDURE — 82653 EL-1 FECAL QUANTITATIVE: CPT | Mod: HCNC

## 2025-06-03 PROCEDURE — 80053 COMPREHEN METABOLIC PANEL: CPT | Mod: HCNC

## 2025-06-03 PROCEDURE — 83993 ASSAY FOR CALPROTECTIN FECAL: CPT | Mod: HCNC

## 2025-06-03 PROCEDURE — 89055 LEUKOCYTE ASSESSMENT FECAL: CPT | Mod: HCNC

## 2025-06-03 PROCEDURE — 36415 COLL VENOUS BLD VENIPUNCTURE: CPT | Mod: HCNC | Performed by: STUDENT IN AN ORGANIZED HEALTH CARE EDUCATION/TRAINING PROGRAM

## 2025-06-03 RX ORDER — INSULIN ASPART 100 [IU]/ML
6 INJECTION, SOLUTION INTRAVENOUS; SUBCUTANEOUS
Status: DISCONTINUED | OUTPATIENT
Start: 2025-06-03 | End: 2025-06-03 | Stop reason: HOSPADM

## 2025-06-03 RX ORDER — MAGNESIUM SULFATE 1 G/100ML
1 INJECTION INTRAVENOUS ONCE
Status: COMPLETED | OUTPATIENT
Start: 2025-06-03 | End: 2025-06-03

## 2025-06-03 RX ORDER — CEPHALEXIN 500 MG/1
500 CAPSULE ORAL EVERY 6 HOURS
Qty: 8 CAPSULE | Refills: 0 | Status: SHIPPED | OUTPATIENT
Start: 2025-06-03 | End: 2025-06-05

## 2025-06-03 RX ORDER — INSULIN GLARGINE 100 [IU]/ML
15 INJECTION, SOLUTION SUBCUTANEOUS NIGHTLY
Qty: 3 ML | Refills: 0 | Status: SHIPPED | OUTPATIENT
Start: 2025-06-03 | End: 2025-07-03

## 2025-06-03 RX ORDER — INSULIN ASPART 100 [IU]/ML
6 INJECTION, SOLUTION INTRAVENOUS; SUBCUTANEOUS 3 TIMES DAILY
Qty: 6 ML | Refills: 0 | Status: SHIPPED | OUTPATIENT
Start: 2025-06-03 | End: 2025-07-06

## 2025-06-03 RX ORDER — INSULIN ASPART 100 [IU]/ML
6 INJECTION, SOLUTION INTRAVENOUS; SUBCUTANEOUS 3 TIMES DAILY
Qty: 5.4 ML | Refills: 0 | Status: CANCELLED | OUTPATIENT
Start: 2025-06-03 | End: 2025-07-03

## 2025-06-03 RX ORDER — PREDNISONE 10 MG/1
TABLET ORAL
Qty: 41 TABLET | Refills: 0 | Status: SHIPPED | OUTPATIENT
Start: 2025-06-04 | End: 2025-06-28

## 2025-06-03 RX ADMIN — SODIUM ZIRCONIUM CYCLOSILICATE 5 G: 5 POWDER, FOR SUSPENSION ORAL at 08:06

## 2025-06-03 RX ADMIN — AMLODIPINE BESYLATE 5 MG: 5 TABLET ORAL at 08:06

## 2025-06-03 RX ADMIN — SODIUM ZIRCONIUM CYCLOSILICATE 5 G: 5 POWDER, FOR SUSPENSION ORAL at 02:06

## 2025-06-03 RX ADMIN — FLUTICASONE FUROATE AND VILANTEROL TRIFENATATE 1 PUFF: 100; 25 POWDER RESPIRATORY (INHALATION) at 08:06

## 2025-06-03 RX ADMIN — DICLOFENAC SODIUM 2 G: 10 GEL TOPICAL at 01:06

## 2025-06-03 RX ADMIN — INSULIN ASPART 6 UNITS: 100 INJECTION, SOLUTION INTRAVENOUS; SUBCUTANEOUS at 12:06

## 2025-06-03 RX ADMIN — Medication 2000 UNITS: at 08:06

## 2025-06-03 RX ADMIN — METOPROLOL SUCCINATE 100 MG: 50 TABLET, EXTENDED RELEASE ORAL at 08:06

## 2025-06-03 RX ADMIN — INSULIN ASPART 2 UNITS: 100 INJECTION, SOLUTION INTRAVENOUS; SUBCUTANEOUS at 01:06

## 2025-06-03 RX ADMIN — LIDOCAINE 1 PATCH: 50 PATCH CUTANEOUS at 08:06

## 2025-06-03 RX ADMIN — DICLOFENAC SODIUM 2 G: 10 GEL TOPICAL at 08:06

## 2025-06-03 RX ADMIN — PANTOPRAZOLE SODIUM 40 MG: 40 TABLET, DELAYED RELEASE ORAL at 08:06

## 2025-06-03 RX ADMIN — INSULIN ASPART 4 UNITS: 100 INJECTION, SOLUTION INTRAVENOUS; SUBCUTANEOUS at 08:06

## 2025-06-03 RX ADMIN — HYDROCODONE BITARTRATE AND ACETAMINOPHEN 1 TABLET: 5; 325 TABLET ORAL at 06:06

## 2025-06-03 RX ADMIN — BUPROPION HYDROCHLORIDE 150 MG: 150 TABLET, FILM COATED, EXTENDED RELEASE ORAL at 08:06

## 2025-06-03 RX ADMIN — APIXABAN 5 MG: 5 TABLET, FILM COATED ORAL at 08:06

## 2025-06-03 RX ADMIN — LOSARTAN POTASSIUM 100 MG: 50 TABLET, FILM COATED ORAL at 08:06

## 2025-06-03 RX ADMIN — TIOTROPIUM BROMIDE INHALATION SPRAY 2 PUFF: 3.12 SPRAY, METERED RESPIRATORY (INHALATION) at 08:06

## 2025-06-03 RX ADMIN — THERA TABS 1 TABLET: TAB at 08:06

## 2025-06-03 RX ADMIN — CEPHALEXIN 500 MG: 500 CAPSULE ORAL at 12:06

## 2025-06-03 RX ADMIN — MAGNESIUM SULFATE HEPTAHYDRATE 1 G: 500 INJECTION, SOLUTION INTRAMUSCULAR; INTRAVENOUS at 09:06

## 2025-06-03 RX ADMIN — METHOCARBAMOL 1500 MG: 750 TABLET ORAL at 02:06

## 2025-06-03 RX ADMIN — CEPHALEXIN 500 MG: 500 CAPSULE ORAL at 06:06

## 2025-06-03 RX ADMIN — PREDNISONE 40 MG: 20 TABLET ORAL at 08:06

## 2025-06-03 RX ADMIN — METHOCARBAMOL 1500 MG: 750 TABLET ORAL at 08:06

## 2025-06-04 ENCOUNTER — TELEPHONE (OUTPATIENT)
Dept: PSYCHIATRY | Facility: CLINIC | Age: 70
End: 2025-06-04
Payer: MEDICARE

## 2025-06-04 ENCOUNTER — PATIENT OUTREACH (OUTPATIENT)
Dept: ADMINISTRATIVE | Facility: CLINIC | Age: 70
End: 2025-06-04
Payer: MEDICARE

## 2025-06-04 LAB
C COLI+JEJ+UPSA DNA STL QL NAA+NON-PROBE: NEGATIVE
CALPROTECTIN INTERP (OHS): ABNORMAL
CALPROTECTIN STOOL (OHS): 1340 ΜG/G
E COLI SXT1 STL QL IA: NEGATIVE
E COLI SXT2 STL QL IA: NEGATIVE
ELASTASE, STOOL INTERPRETATION (OHS): ABNORMAL
PANCREATIC ELASTASE, FECAL (OHS): 68.9 ΜG/G
RV AG STL QL IA.RAPID: NEGATIVE

## 2025-06-05 ENCOUNTER — TELEPHONE (OUTPATIENT)
Dept: PALLIATIVE MEDICINE | Facility: CLINIC | Age: 70
End: 2025-06-05
Payer: MEDICARE

## 2025-06-05 ENCOUNTER — PATIENT MESSAGE (OUTPATIENT)
Dept: HEMATOLOGY/ONCOLOGY | Facility: CLINIC | Age: 70
End: 2025-06-05
Payer: MEDICARE

## 2025-06-05 LAB — BACTERIA STL CULT: NORMAL

## 2025-06-06 ENCOUNTER — PATIENT MESSAGE (OUTPATIENT)
Dept: ENDOCRINOLOGY | Facility: CLINIC | Age: 70
End: 2025-06-06
Payer: MEDICARE

## 2025-06-06 LAB
BACTERIA FLD CULT: NORMAL
FAT STL QL: NORMAL
IGNF NEG CNTRL BLD: NORMAL
M TB IFN-G BLD-IMP: NEGATIVE
MITOGEN IGNF.SPOT COUNT BLD: NORMAL
NEUTRAL FAT STL QL: NORMAL
T-SPOT PANEL A SPOT COUNT: 0
T-SPOT PANEL B SPOT COUNT: 0

## 2025-06-09 ENCOUNTER — PATIENT MESSAGE (OUTPATIENT)
Dept: HEMATOLOGY/ONCOLOGY | Facility: CLINIC | Age: 70
End: 2025-06-09
Payer: MEDICARE

## 2025-06-09 ENCOUNTER — PATIENT MESSAGE (OUTPATIENT)
Dept: FAMILY MEDICINE | Facility: CLINIC | Age: 70
End: 2025-06-09
Payer: MEDICARE

## 2025-06-09 DIAGNOSIS — I26.99 PULMONARY EMBOLISM, UNSPECIFIED CHRONICITY, UNSPECIFIED PULMONARY EMBOLISM TYPE, UNSPECIFIED WHETHER ACUTE COR PULMONALE PRESENT: ICD-10-CM

## 2025-06-09 DIAGNOSIS — F32.9 REACTIVE DEPRESSION: ICD-10-CM

## 2025-06-09 DIAGNOSIS — S16.1XXA STRAIN OF NECK MUSCLE, INITIAL ENCOUNTER: ICD-10-CM

## 2025-06-10 ENCOUNTER — LAB VISIT (OUTPATIENT)
Dept: LAB | Facility: HOSPITAL | Age: 70
End: 2025-06-10
Attending: INTERNAL MEDICINE
Payer: MEDICARE

## 2025-06-10 DIAGNOSIS — R53.83 FATIGUE, UNSPECIFIED TYPE: ICD-10-CM

## 2025-06-10 DIAGNOSIS — C34.91 NSCLC OF RIGHT LUNG: ICD-10-CM

## 2025-06-10 LAB
ABSOLUTE NEUTROPHIL COUNT (OHS): 7.25 K/UL (ref 1.8–7.7)
ALBUMIN SERPL BCP-MCNC: 2.6 G/DL (ref 3.5–5.2)
ALP SERPL-CCNC: 76 UNIT/L (ref 40–150)
ALT SERPL W/O P-5'-P-CCNC: 19 UNIT/L (ref 10–44)
ANION GAP (OHS): 12 MMOL/L (ref 8–16)
AST SERPL-CCNC: 13 UNIT/L (ref 11–45)
BILIRUB SERPL-MCNC: 0.3 MG/DL (ref 0.1–1)
BUN SERPL-MCNC: 21 MG/DL (ref 8–23)
CALCIUM SERPL-MCNC: 9.2 MG/DL (ref 8.7–10.5)
CHLORIDE SERPL-SCNC: 107 MMOL/L (ref 95–110)
CO2 SERPL-SCNC: 22 MMOL/L (ref 23–29)
CREAT SERPL-MCNC: 0.9 MG/DL (ref 0.5–1.4)
ERYTHROCYTE [DISTWIDTH] IN BLOOD BY AUTOMATED COUNT: 19.9 % (ref 11.5–14.5)
GFR SERPLBLD CREATININE-BSD FMLA CKD-EPI: >60 ML/MIN/1.73/M2
GLUCOSE SERPL-MCNC: 151 MG/DL (ref 70–110)
HCT VFR BLD AUTO: 33.1 % (ref 37–48.5)
HGB BLD-MCNC: 9.5 GM/DL (ref 12–16)
IMM GRANULOCYTES # BLD AUTO: 0.2 K/UL (ref 0–0.04)
MCH RBC QN AUTO: 24 PG (ref 27–31)
MCHC RBC AUTO-ENTMCNC: 28.7 G/DL (ref 32–36)
MCV RBC AUTO: 84 FL (ref 82–98)
PLATELET # BLD AUTO: 355 K/UL (ref 150–450)
PMV BLD AUTO: 10.4 FL (ref 9.2–12.9)
POTASSIUM SERPL-SCNC: 3.9 MMOL/L (ref 3.5–5.1)
PROT SERPL-MCNC: 7.2 GM/DL (ref 6–8.4)
RBC # BLD AUTO: 3.96 M/UL (ref 4–5.4)
SODIUM SERPL-SCNC: 141 MMOL/L (ref 136–145)
T4 FREE SERPL-MCNC: 0.96 NG/DL (ref 0.71–1.51)
T4 FREE SERPL-MCNC: 0.97 NG/DL (ref 0.71–1.51)
TSH SERPL-ACNC: 4.13 UIU/ML (ref 0.4–4)
WBC # BLD AUTO: 8.97 K/UL (ref 3.9–12.7)

## 2025-06-10 PROCEDURE — 85027 COMPLETE CBC AUTOMATED: CPT | Mod: HCNC

## 2025-06-10 PROCEDURE — 84443 ASSAY THYROID STIM HORMONE: CPT | Mod: HCNC

## 2025-06-10 PROCEDURE — 84450 TRANSFERASE (AST) (SGOT): CPT | Mod: HCNC

## 2025-06-10 PROCEDURE — 36415 COLL VENOUS BLD VENIPUNCTURE: CPT | Mod: HCNC

## 2025-06-10 RX ORDER — LIDOCAINE 50 MG/G
1 PATCH TOPICAL DAILY
Qty: 14 PATCH | Refills: 0 | Status: SHIPPED | OUTPATIENT
Start: 2025-06-10

## 2025-06-10 RX ORDER — HYDROCODONE BITARTRATE AND ACETAMINOPHEN 5; 325 MG/1; MG/1
1 TABLET ORAL EVERY 6 HOURS PRN
Qty: 14 TABLET | Refills: 0 | Status: SHIPPED | OUTPATIENT
Start: 2025-06-10

## 2025-06-10 RX ORDER — BUPROPION HYDROCHLORIDE 150 MG/1
150 TABLET ORAL DAILY
Qty: 90 TABLET | Refills: 1 | Status: SHIPPED | OUTPATIENT
Start: 2025-06-10

## 2025-06-11 ENCOUNTER — OFFICE VISIT (OUTPATIENT)
Dept: HEMATOLOGY/ONCOLOGY | Facility: CLINIC | Age: 70
End: 2025-06-11
Payer: MEDICARE

## 2025-06-11 ENCOUNTER — TELEPHONE (OUTPATIENT)
Dept: HEMATOLOGY/ONCOLOGY | Facility: CLINIC | Age: 70
End: 2025-06-11
Payer: MEDICARE

## 2025-06-11 DIAGNOSIS — I44.7 LEFT BUNDLE-BRANCH BLOCK: ICD-10-CM

## 2025-06-11 DIAGNOSIS — I82.431 DEEP VEIN THROMBOSIS (DVT) OF POPLITEAL VEIN OF RIGHT LOWER EXTREMITY, UNSPECIFIED CHRONICITY: ICD-10-CM

## 2025-06-11 DIAGNOSIS — F32.9 REACTIVE DEPRESSION: ICD-10-CM

## 2025-06-11 DIAGNOSIS — C79.51 MALIGNANT NEOPLASM METASTATIC TO BONE: ICD-10-CM

## 2025-06-11 DIAGNOSIS — C34.91 NSCLC OF RIGHT LUNG: Primary | ICD-10-CM

## 2025-06-11 DIAGNOSIS — J70.0 RADIATION PNEUMONITIS: ICD-10-CM

## 2025-06-11 DIAGNOSIS — I26.99 PULMONARY EMBOLISM, UNSPECIFIED CHRONICITY, UNSPECIFIED PULMONARY EMBOLISM TYPE, UNSPECIFIED WHETHER ACUTE COR PULMONALE PRESENT: ICD-10-CM

## 2025-06-11 DIAGNOSIS — K52.9 COLITIS: ICD-10-CM

## 2025-06-11 DIAGNOSIS — I42.0 DILATED CARDIOMYOPATHY: ICD-10-CM

## 2025-06-11 DIAGNOSIS — M89.8X5 LYTIC BONE LESION OF LEFT FEMUR: ICD-10-CM

## 2025-06-11 DIAGNOSIS — Z95.810 CARDIAC RESYNCHRONIZATION THERAPY DEFIBRILLATOR (CRT-D) IN PLACE: ICD-10-CM

## 2025-06-11 DIAGNOSIS — M79.89 FOOT SWELLING: ICD-10-CM

## 2025-06-11 DIAGNOSIS — Z85.3 HISTORY OF BREAST CANCER IN FEMALE: ICD-10-CM

## 2025-06-11 DIAGNOSIS — M19.90 INFLAMMATORY ARTHRITIS: ICD-10-CM

## 2025-06-11 DIAGNOSIS — C77.1 SECONDARY AND UNSPECIFIED MALIGNANT NEOPLASM OF INTRATHORACIC LYMPH NODES: ICD-10-CM

## 2025-06-11 NOTE — PROGRESS NOTES
The patient location is: [place]  The chief complaint leading to consultation is: hospital follow up    Visit type: audiovisual    Face to Face time with patient: 20  40 minutes of total time spent on the encounter, which includes face to face time and non-face to face time preparing to see the patient (eg, review of tests), Obtaining and/or reviewing separately obtained history, Documenting clinical information in the electronic or other health record, Independently interpreting results (not separately reported) and communicating results to the patient/family/caregiver, or Care coordination (not separately reported).     Each patient to whom he or she provides medical services by telemedicine is:  (1) informed of the relationship between the physician and patient and the respective role of any other health care provider with respect to management of the patient; and (2) notified that he or she may decline to receive medical services by telemedicine and may withdraw from such care at any time.      Est Patient Virtual Visit requirements-  [id number]  SYNCHRONOUS AUDIO-VIDEO VISIT, EST, SF MDM 10+ MIN  97763  SYNCHRONOUS AUDIO-VIDEO VISIT, EST, LOW MDM 20+ MIN  24829  SYNCHRONOUS AUDIO-VIDEO VISIT, EST, MOD MDM 30+ MIN  74743  SYNCHRONOUS AUDIO-VIDEO VISIT, EST, HIGH MDM 40+ MIN  (This text will automatically delete):[id number]}    ONCOLOGY FOLLOW UP VISIT.       Cancer/Stage/TNM:    Cancer Staging   NSCLC of right lung  Staging form: Lung, AJCC 8th Edition  - Clinical stage from 9/29/2020: Stage IIIA (cT3, cN1, cM0) - Signed by Ramo Tucker MD on 10/24/2020  - Clinical stage from 7/31/2024: Stage DEREK (cT3, cN2, cM1a) - Signed by Javi Avina MD on 8/2/2024         Oncology History   NSCLC of right lung   9/29/2020 Cancer Staged    Staging form: Lung, AJCC 8th Edition  - Clinical stage from 9/29/2020: Stage IIIA (cT3, cN1, cM0)     10/14/2020 Initial Diagnosis    NSCLC of right lung     11/17/2020 - 12/30/2020  Radiation Therapy    Treating physician: Harvinder Lara     Site  Technique  Energy  Dose/Fx (Gy)  #Fx  Total Dose (Gy)    RLL Lung  VMAT  6X  2  30 / 30  60         2/6/2023 -  Chemotherapy    Treatment Summary   Plan Name: OP NSCLC NIVOLUMAB 360 MG D1 & D22 WITH IPILIMUMAB 1 MG/KG Q6W PLUS CARBOPLATIN (AUC) PACLITAXEL Q3W X2 DOSES  Treatment Goal: Control  Status: Active  Start Date: 2/6/2023  End Date: 8/12/2025 (Planned)  Provider: Javi Avina MD  Chemotherapy: CARBOplatin (PARAPLATIN) 525 mg in sodium chloride 0.9% 337.5 mL chemo infusion, 525 mg, Intravenous, Clinic/HOD 1 time, 1 of 1 cycle  Administration: 525 mg (2/6/2023), 490 mg (2/27/2023)  PACLitaxeL (TAXOL) 200 mg/m2 = 396 mg in sodium chloride 0.9% 500 mL chemo infusion, 200 mg/m2 = 396 mg (100 % of original dose 200 mg/m2), Intravenous, Clinic/HOD 1 time, 1 of 1 cycle  Dose modification: 200 mg/m2 (original dose 200 mg/m2, Cycle 1), 200 mg/m2 (original dose 200 mg/m2, Cycle 1)  Administration: 396 mg (2/6/2023), 396 mg (2/27/2023)     6/12/2023 - 6/30/2023 Radiation Therapy    Treating physician: Harvinder Lara    Site Technique Energy Dose/Fx (Gy) #Fx Total Dose (Gy)   RLL Lung RtLung 6X 4 15 / 15 60        7/31/2024 Cancer Staged    Staging form: Lung, AJCC 8th Edition  - Clinical stage from 7/31/2024: Stage DEREK (cT3, cN2, cM1a)          Interval History:   Patient admitted on 5/30/25 for due to c/f ICI myositis and or ICI colitis. During hospitalization patient was without diarrhea, making ICI colitis unlikely. Rheumatology consulted for possible ICI Myositis, labs mostly unremarkable however patient found to have effusion in the L knee. Arthrocentesis performed to r/o CPPD vs ICI related compliacations, she was started on prednisone 40 mg with symptomatic improvement on 6/2. Patient's arthrocentesis was not consistent with CPPD, suspect inflammatory arthritis associated with ICI. Discharged with prednisone taper given symptoms  "improvement.      Today, patient reports waking up with minimal pain but yesterday she could barely walk the day prior. Currently on 20 mg or prednisone starting 6/4. Needing to use a cane to ambulate. Pain is in her shoulders, neck, both knees, ankles, and stiffness only in the fingers. Wellbutrin refilled by PCP. Using [place] rarely. Aleeve arthritis has been helpful. Feet and ankles are swollen. Elevation and moving around improves swelling. No diarrhea since hospitalization. States she has been overwhelmed with medical issues and health of her elderly cat. Taking a mental health "break" this week and open to resuming Entyvio next week.    ROS:  Review of Systems   Constitutional:  Positive for fatigue. Negative for activity change, appetite change, chills, fever and unexpected weight change.   HENT:  Negative for ear pain, facial swelling, hearing loss, mouth sores, nosebleeds, sore throat and trouble swallowing.    Eyes:  Negative for pain, discharge, redness and visual disturbance.   Respiratory:  Negative for cough, chest tightness and shortness of breath.    Cardiovascular:  Positive for leg swelling. Negative for chest pain and palpitations.   Gastrointestinal:  Negative for abdominal distention, abdominal pain, blood in stool, constipation, diarrhea, nausea and vomiting.   Endocrine: Negative for cold intolerance and heat intolerance.   Genitourinary:  Negative for decreased urine volume, difficulty urinating, dysuria, frequency, hematuria, hot flashes, urgency and vaginal bleeding.   Musculoskeletal:  Positive for arthralgias. Negative for gait problem, leg pain and myalgias.   Integumentary:  Negative for pallor, rash and wound.   Allergic/Immunologic: Negative for immunocompromised state.   Neurological:  Negative for dizziness, tremors, weakness, light-headedness, numbness and headaches.   Hematological:  Negative for adenopathy. Does not bruise/bleed easily.   Psychiatric/Behavioral:  Negative for " "agitation, confusion, dysphoric mood and sleep disturbance. The patient is nervous/anxious.             A complete 12-point review of systems was reviewed and is negative except as mentioned above.     Past Medical History:   Past Medical History:   Diagnosis Date    AICD (automatic cardioverter/defibrillator) present     Asthma     bronchitis in past    Breast cancer 2016    right    Cardiac pacemaker     Cardiomyopathy     COPD (chronic obstructive pulmonary disease)     Diabetes mellitus, type 2     Hyperglycemia     Hyperlipidemia     Hypertension     Malignant neoplasm of overlapping sites of female breast 2/12/2016    Nuclear sclerosis of both eyes 8/12/2020    Respiratory distress 3/12/2020        Allergies:   Review of patient's allergies indicates:   Allergen Reactions    Taxol [paclitaxel]      Hypersensitivity reaction to taxol, symptoms included shortness of breath, nausea, dizziness, flushing     Carboplatin Other (See Comments)     Itching and hives    Adhesive Rash     tegaderm burns and blistered skin        Medications:   Current Outpatient Medications   Medication Sig Dispense Refill    ACCU-CHEK ALFRED PLUS TEST STRP Strp USE TO TEST THREE TIMES DAILY 200 strip 3    acetaminophen (TYLENOL) 500 MG tablet Take 2 tablets (1,000 mg total) by mouth every 8 (eight) hours as needed for Pain.      albuterol (ACCUNEB) 1.25 mg/3 mL Nebu use 1 AMPULE (3ml) via NEBULIZER EVERY 6 HOURS AS NEEDED 300 mL 3    albuterol (VENTOLIN HFA) 90 mcg/actuation inhaler Inhale 2 puffs into the lungs every 4 (four) hours as needed for Wheezing or Shortness of Breath. Rescue 18 g 4    amLODIPine (NORVASC) 5 MG tablet TAKE 1 TABLET BY MOUTH EVERY DAY 90 tablet 2    apixaban (ELIQUIS) 5 mg Tab Take 1 tablet (5 mg total) by mouth 2 (two) times daily. 180 tablet 2    atorvastatin (LIPITOR) 10 MG tablet TAKE 1 TABLET BY MOUTH EVERY DAY 90 tablet 1    BD ULTRA-FINE GIN PEN NEEDLE 32 gauge x 5/32" Ndle For once daily insulin " "injections 50 each 3    buPROPion (WELLBUTRIN XL) 150 MG TB24 tablet Take 1 tablet (150 mg total) by mouth once daily. 90 tablet 1    cetirizine (ZYRTEC) 10 MG tablet Take 10 mg by mouth every evening.       cholecalciferol, vitamin D3, (VITAMIN D3) 50 mcg (2,000 unit) Tab Take 1 tablet (2,000 Units total) by mouth once daily. 30 tablet 11    diclofenac sodium (VOLTAREN ARTHRITIS PAIN) 1 % Gel Apply 2 g topically 4 (four) times daily. 100 g 0    HYDROcodone-acetaminophen (NORCO) 5-325 mg per tablet Take 1 tablet by mouth every 6 (six) hours as needed for Pain. 14 tablet 0    insulin aspart U-100 (NOVOLOG) 100 unit/mL (3 mL) InPn pen Inject 6 Units into the skin 3 (three) times daily. 6 mL 0    insulin glargine U-100, Lantus, (LANTUS SOLOSTAR U-100 INSULIN) 100 unit/mL (3 mL) InPn pen Inject 15 Units into the skin every evening. 3 mL 0    LIDOcaine (LIDODERM) 5 % Place 1 patch onto the skin once daily. Remove & Discard patch within 12 hours or as directed by MD 14 patch 0    losartan (COZAAR) 100 MG tablet TAKE 1 TABLET BY MOUTH EVERY DAY 90 tablet 1    metFORMIN (GLUCOPHAGE) 500 MG tablet Take 1 tablet (500 mg total) by mouth 2 (two) times daily with meals. Needs appointment for future refills 180 tablet 0    metoprolol succinate (TOPROL-XL) 100 MG 24 hr tablet TAKE 1 TABLET BY MOUTH EVERY DAY 90 tablet 1    multivitamin (THERAGRAN) per tablet Take 1 tablet by mouth once daily.      ondansetron (ZOFRAN-ODT) 8 MG TbDL Take 1 tablet (8 mg total) by mouth every 8 (eight) hours as needed (nausea/vomiting). Take 1 tablet (8 mg) by mouth every 8 hours as needed for nausea/vomiting. 60 tablet 5    pantoprazole (PROTONIX) 40 MG tablet TAKE 1 TABLET BY MOUTH EVERY DAY 90 tablet 3    pen needle, diabetic 32 gauge x 5/32" Ndle USE AS DIRECTED WITH INSULIN 4 TIMES DAILY 100 each 0    predniSONE (DELTASONE) 10 MG tablet Take 3 tablets (30 mg total) by mouth once daily for 3 days, THEN 2 tablets (20 mg total) once daily for 7 " days, THEN 1.5 tablets (15 mg total) once daily for 7 days, THEN 1 tablet (10 mg total) once daily for 7 days. 41 tablet 0    [Paused] sertraline (ZOLOFT) 100 MG tablet TAKE 1 TABLET BY MOUTH EVERY DAY IN THE EVENING 90 tablet 3    tiotropium (SPIRIVA WITH HANDIHALER) 18 mcg inhalation capsule INHALE THE CONTENTS OF 1 CAPSULE BY MOUTH EVERY DAY 90 capsule 3    WIXELA INHUB 250-50 mcg/dose diskus inhaler INHALE 1 PUFF INTO THE LUNGS 2 (TWO) TIMES DAILY. CONTROLLER 180 each 3     No current facility-administered medications for this visit.     Facility-Administered Medications Ordered in Other Visits   Medication Dose Route Frequency Provider Last Rate Last Admin    fentaNYL injection 25 mcg  25 mcg Intravenous Q5 Min PRKeesha Mirza MD        haloperidol lactate injection 0.5 mg  0.5 mg Intravenous Once PRKeesha Mirza MD        HYDROmorphone injection 0.2 mg  0.2 mg Intravenous Q5 Min PRKeesha Miraz MD        ondansetron injection 4 mg  4 mg Intravenous Once PRKeesha Mirza MD        sodium chloride 0.9% flush 10 mL  10 mL Intravenous PRN Keesha Mratins MD            Physical Exam:   LMP  (LMP Unknown)      ECOG Performance Status: (foot note - ECOG PS provided by Eastern Cooperative Oncology Group) 0 - Asymptomatic    Physical Exam  Constitutional:       General: She is awake.      Appearance: Normal appearance. She is not ill-appearing.   Musculoskeletal:      Right ankle: Swelling present.      Left ankle: Swelling present.      Right foot: Swelling present.      Left foot: Swelling present.      Comments: Visualized ~+3 swelling of feet and ankles on virtual appt   Neurological:      Mental Status: She is alert.   Psychiatric:         Attention and Perception: Attention normal.         Mood and Affect: Mood is anxious.         Speech: Speech normal.         Behavior: Behavior is cooperative.         Thought Content: Thought content normal.         Cognition and Memory: Cognition normal.         Judgment: Judgment  normal.                    Labs:   Recent Results (from the past 48 hours)   CBC Oncology    Collection Time: 06/10/25 12:00 PM   Result Value Ref Range    WBC 8.97 3.90 - 12.70 K/uL    RBC 3.96 (L) 4.00 - 5.40 M/uL    HGB 9.5 (L) 12.0 - 16.0 gm/dL    HCT 33.1 (L) 37.0 - 48.5 %    MCV 84 82 - 98 fL    MCH 24.0 (L) 27.0 - 31.0 pg    MCHC 28.7 (L) 32.0 - 36.0 g/dL    RDW 19.9 (H) 11.5 - 14.5 %    Platelet Count 355 150 - 450 K/uL    MPV 10.4 9.2 - 12.9 fL    Neut # 7.25 1.8 - 7.7 K/uL    Imm Grans # 0.20 (H) 0.00 - 0.04 K/uL   Comprehensive Metabolic Panel    Collection Time: 06/10/25 12:00 PM   Result Value Ref Range    Sodium 141 136 - 145 mmol/L    Potassium 3.9 3.5 - 5.1 mmol/L    Chloride 107 95 - 110 mmol/L    CO2 22 (L) 23 - 29 mmol/L    Glucose 151 (H) 70 - 110 mg/dL    BUN 21 8 - 23 mg/dL    Creatinine 0.9 0.5 - 1.4 mg/dL    Calcium 9.2 8.7 - 10.5 mg/dL    Protein Total 7.2 6.0 - 8.4 gm/dL    Albumin 2.6 (L) 3.5 - 5.2 g/dL    Bilirubin Total 0.3 0.1 - 1.0 mg/dL    ALP 76 40 - 150 unit/L    AST 13 11 - 45 unit/L    ALT 19 10 - 44 unit/L    Anion Gap 12 8 - 16 mmol/L    eGFR >60 >60 mL/min/1.73/m2   T4, Free    Collection Time: 06/10/25 12:00 PM   Result Value Ref Range    Free T4 0.97 0.71 - 1.51 ng/dL   TSH    Collection Time: 06/10/25 12:00 PM   Result Value Ref Range    TSH 4.129 (H) 0.400 - 4.000 uIU/mL    Free T4 0.96 0.71 - 1.51 ng/dL                    Imaging:   X-Ray Knee 1 or 2 View Left  EXAMINATION:  XR KNEE 1 OR 2 VIEW LEFT    CLINICAL HISTORY:  possible reactive arthritis, seronegative spondyloarthritis;    FINDINGS:  Knee three views left.    There is chondrocalcinosis.  There is an intramedullary sade in the femur.  There is a small joint effusion.  No fracture dislocation bone destruction seen.  No acute trauma seen.    Electronically signed by: Dean Miller MD  Date:    06/02/2025  Time:    16:07  X-ray Shoulder 2 or More Views Right  EXAMINATION:  XR SHOULDER COMPLETE 2 OR MORE VIEWS  RIGHT    CLINICAL HISTORY:  possible inflammatory arthritis/tendinitis;    FINDINGS:  Shoulder complete three views right.    There is baseline DJD.  No fracture dislocation bone destruction seen.  There is a pacemaker.  No acute process seen.  No acute fracture, dislocation, or bone destruction seen.  No trauma seen.    Electronically signed by: Dean Miller MD  Date:    06/02/2025  Time:    16:06            Diagnoses:       1. NSCLC of right lung    2. Secondary and unspecified malignant neoplasm of intrathoracic lymph nodes    3. Lytic bone lesion of left femur s/p IM nail on 8/20/2024    4. Colitis    5. Radiation pneumonitis    6. History of breast cancer in female    7. Dilated cardiomyopathy    8. Left bundle-branch block    9. Cardiac resynchronization therapy defibrillator (CRT-D) in place    10. Malignant neoplasm metastatic to bone    11. Deep vein thrombosis (DVT) of popliteal vein of right lower extremity, unspecified chronicity    12. Pulmonary embolism, unspecified chronicity, unspecified pulmonary embolism type, unspecified whether acute cor pulmonale present    13. Inflammatory arthritis    14. Foot swelling    15. Reactive depression          Assessment and Plan:         Recurrent NSCLC (bone and LN mets)  Plan is for definitive chemoRT, will need re-staging scans prior.  Reviewed with patient in detail her diagnosis, stage, treatment options, and prognosis (3 year OS 66% vs. 43.5% without durvalumab) with standard of care chemoRT followed by maintenance immunotherapy.  Patient has consented for [id number] clinical trial, a randomized control trial evaluating combination chemoRT + durvalumab followed by maintenance vs. SOC chemoRT -> maintenance.  CT scans 7/2021 with CR.  - patient randomized on [id number] to SOC arm (Imfinzi) - completed 12/2021.    - CT scan 12/2022 with progressing pulmonary nodule in LLL, still with stable disease in RLL consolidation. PET scan also showing enlarging right lower  lobe mass with increased hypermetabolic activity concerning for recurrence. Will present her case at the next thoracic tumor board to discuss biopsy and possible treatment options.   - Biopsy of lung mass consistent with SCC. Initiated on 9LA. Initial re-staging scans with stable disease.   - Patient has completed RT 4-5 weeks ago and has no symptoms. She has some mild inflammation on Chest CT, but since asymptomatic and completed RT will re-initiate IO.  - Now s/p 3 cycles of 9LA. With persistent IO induced rash and continued pneumonitis on recent imaging, plan to hold IO and proceed with surveillance.  - 10/16/23 CT showing continued evidence of inflammation/infection. Currently has cold symptoms. +Covid.   - Repeat CT scan (preliminary reading) shows improvement in inflammation but indeterminate possible new liver metastases. Will confirm with PETCT.  - CT CAP is showing progression of disease in L hilum, hilar LN, mediastinal LN, and pleural effusion.   - Plan is to restart ipi + nivo. Not eligible for any trials.   - Holding Ipi for colitis. Has now developed inflammatory arthritis on Opdivo monotherapy - holding treatment. Due for imaging in July.      Left femur lytic lesion  - S/p left femur nailing. Will get bone scans with re-staging to evaluate for metastatic bone disease. Awaiting dental clearance for Zometa use - not cleared for bisphosphonate (needs 4 extractions).    Colitis  - Grade 4 needing hospitalization. Required multiple days of steroids prior to resolution of symptoms.   - Currently overdue for maintenance Entyvio (dose #4). Had mild symptoms of recurrence prior to recent hospitalization and no current diarrhea but calprotectin on 6/3 was 1340. Discussed resuming Entyvio next week to avoid any worsening of this issue as she tapers off steroids.     Radiation Pneumonitis  - Resolved. First noted in April 2021 and resolved with steroids. Discussed symptoms to report with re-challenging  immunotherapy.     History of breast cancer  - Stage 1A hormone positive HER2 negative IDC s/p lumpectomy 2016.      Dilated Cardiomyopathy  Left Bundle branch block  AICD in place  - Stable, follows with cardiology. AICD exchanged 11/2024.     DVT of RLE  PE  - DVT right popliteal vein in October 2024. Now with PE. Patient only took starter package of Eliquis for DVT. Instructed to restart Eliquis and will need to continue for 6 months at least. Will repeat CTA and US in October.     Inflammatory Arthritis  - arthrocentesis of left knee was not consistent with CPPD, suspect inflammatory arthritis associated with ICI. Will hold further immunotherapy at this time. Continue current steroid taper. Has f/u with Rheumatology next week. May consider giving Actemra should this issue be more problematic than colitis.    Swelling of feet and ankles  - discussed use of compression stockings and elevation. Compliant with Eliquis.      Depression  - Continue Wellbutrin. Will discuss Hem/Onc psych f/u outpatient.      Patient is in agreement with the proposed treatment plan. All questions were answered to the patient's satisfaction. Pt knows to call clinic if anything is needed before the next clinic visit.    Aide Dumont, MSN, APRN, FNP-C  Hematology and Medical Oncology  Nurse Practitioner to Dr. Javi Avina  Nurse Practitioner, [facility]            Route Chart for Scheduling    Med Onc Chart Routing      Follow up with physician    Follow up with DANIEL Other.   Infusion scheduling note    Injection scheduling note    Labs    Imaging    Pharmacy appointment    Other referrals              Treatment Plan Information   OP NSCLC NIVOLUMAB 360 MG D1 & D22 WITH IPILIMUMAB 1 MG/KG Q6W PLUS CARBOPLATIN (AUC) PACLITAXEL Q3W X2 DOSES Javi Avina MD   Associated diagnosis: Lung mass   noted on 3/12/2020  Associated diagnosis: NSCLC of right lung Stage IIIA, Stage DEREK cT3, cN1, cM0, cT3, cN2,  cM1a noted on 10/14/2020   Line of treatment: First Line  Treatment Goal: Control     Upcoming Treatment Dates - OP NSCLC NIVOLUMAB 360 MG D1 & D22 WITH IPILIMUMAB 1 MG/KG Q6W PLUS CARBOPLATIN (AUC) PACLITAXEL Q3W X2 DOSES    5/20/2025       Immunotherapy       nivolumab 480 mg in 0.9% NaCl 98 mL infusion  6/17/2025       Immunotherapy       nivolumab 480 mg in 0.9% NaCl 98 mL infusion  7/15/2025       Immunotherapy       nivolumab 480 mg in 0.9% NaCl 98 mL infusion  8/12/2025       Immunotherapy       nivolumab 480 mg in 0.9% NaCl 98 mL infusion    Supportive Plan Information  OP ZOLEDRONIC ACID (ZOMETA) Q4W Aide Magaña NP   Associated Diagnosis: Malignant neoplasm metastatic to bone   noted on 8/21/2024   Line of treatment: Supportive Care   Treatment goal: Supportive     Upcoming Treatment Dates - OP ZOLEDRONIC ACID (ZOMETA) Q4W    9/16/2024       Medications       zoledronic 4 mg/100 mL infusion 4 mg  10/14/2024       Medications       zoledronic 4 mg/100 mL infusion 4 mg  11/11/2024       Medications       zoledronic 4 mg/100 mL infusion 4 mg  12/9/2024       Medications       zoledronic 4 mg/100 mL infusion 4 mg    Therapy Plan Information  ENTYVIO GI for Colitis, noted on 10/1/2024  Pre-Medications  acetaminophen tablet 650 mg  650 mg, Oral, Every visit  cetirizine tablet 10 mg  10 mg, Oral, Every visit  Medications  vedolizumab (ENTYVIO) 300 mg/250 mL NS IVPB  300 mg, Intravenous, Every 8 weeks  PRN Medications  acetaminophen tablet 650 mg  650 mg, Oral, PRN  albuterol-ipratropium 2.5 mg-0.5 mg/3 mL nebulizer solution 3 mL  3 mL, Nebulization, PRN  methylPREDNISolone sodium succinate injection 40 mg  40 mg, Intravenous, PRN  EPINEPHrine (PF) injection 0.3 mg  0.3 mg, Subcutaneous, PRN  Flushes  0.9% NaCl 250 mL flush bag  Intravenous, Every visit  sodium chloride 0.9% flush 10 mL  10 mL, Intravenous, Every visit  heparin, porcine (PF) 100 unit/mL injection flush 500 Units  500 Units, Intravenous, Every  visit  alteplase injection 2 mg  2 mg, Intra-Catheter, Every visit      No therapy plan of the specified type found.    No therapy plan of the specified type found.

## 2025-06-13 ENCOUNTER — TELEPHONE (OUTPATIENT)
Dept: PSYCHIATRY | Facility: CLINIC | Age: 70
End: 2025-06-13
Payer: MEDICARE

## 2025-06-16 ENCOUNTER — PATIENT MESSAGE (OUTPATIENT)
Dept: FAMILY MEDICINE | Facility: CLINIC | Age: 70
End: 2025-06-16
Payer: MEDICARE

## 2025-06-17 ENCOUNTER — PATIENT MESSAGE (OUTPATIENT)
Dept: ENDOCRINOLOGY | Facility: CLINIC | Age: 70
End: 2025-06-17

## 2025-06-17 ENCOUNTER — OFFICE VISIT (OUTPATIENT)
Dept: ENDOCRINOLOGY | Facility: CLINIC | Age: 70
End: 2025-06-17
Payer: MEDICARE

## 2025-06-17 DIAGNOSIS — T38.0X5A ADRENAL CORTICOSTEROID CAUSING ADVERSE EFFECT IN THERAPEUTIC USE: ICD-10-CM

## 2025-06-17 DIAGNOSIS — E11.69 TYPE 2 DIABETES MELLITUS WITH OTHER SPECIFIED COMPLICATION, WITH LONG-TERM CURRENT USE OF INSULIN: Primary | ICD-10-CM

## 2025-06-17 DIAGNOSIS — Z79.4 TYPE 2 DIABETES MELLITUS WITH OTHER SPECIFIED COMPLICATION, WITH LONG-TERM CURRENT USE OF INSULIN: Primary | ICD-10-CM

## 2025-06-17 PROBLEM — E11.9 TYPE 2 DIABETES MELLITUS, WITH LONG-TERM CURRENT USE OF INSULIN: Status: ACTIVE | Noted: 2024-11-11

## 2025-06-17 PROCEDURE — 1157F ADVNC CARE PLAN IN RCRD: CPT | Mod: CPTII,HCNC,95, | Performed by: INTERNAL MEDICINE

## 2025-06-17 PROCEDURE — 98006 SYNCH AUDIO-VIDEO EST MOD 30: CPT | Mod: HCNC,95,, | Performed by: INTERNAL MEDICINE

## 2025-06-17 PROCEDURE — G2211 COMPLEX E/M VISIT ADD ON: HCPCS | Mod: HCNC,95,, | Performed by: INTERNAL MEDICINE

## 2025-06-17 RX ORDER — INSULIN ASPART 100 [IU]/ML
INJECTION, SOLUTION INTRAVENOUS; SUBCUTANEOUS
Qty: 15 ML | Refills: 3 | Status: SHIPPED | OUTPATIENT
Start: 2025-06-17

## 2025-06-17 RX ORDER — INSULIN GLARGINE 100 [IU]/ML
12 INJECTION, SOLUTION SUBCUTANEOUS NIGHTLY
Qty: 15 ML | Refills: 3 | Status: SHIPPED | OUTPATIENT
Start: 2025-06-17 | End: 2026-10-30

## 2025-06-17 NOTE — PROGRESS NOTES
[facility]  Endocrinology Clinic  Pharmacist Note    I saw this patient today following the plan of care established by Dr. Olivarez. Supervision in clinic during the encounter was performed by Dr. Olivarez.    The patient location is: [place]  The chief complaint leading to consultation is: Hospital f/u    Visit type: audiovisual    Face to Face time with patient: 30 minutes  45 minutes of total time spent on the encounter, which includes face to face time and non-face to face time preparing to see the patient (eg, review of tests), Obtaining and/or reviewing separately obtained history, Documenting clinical information in the electronic or other health record, Independently interpreting results (not separately reported) and communicating results to the patient/family/caregiver, or Care coordination (not separately reported).     Each patient to whom he or she provides medical services by telemedicine is:  (1) informed of the relationship between the physician and patient and the respective role of any other health care provider with respect to management of the patient; and (2) notified that he or she may decline to receive medical services by telemedicine and may withdraw from such care at any time.    Today's Visit Date: 6/17/2025    Subjective:      Patient ID: Hannah Montoya is a 69 y.o. female.    Chief Complaint:    No chief complaint on file.    History of Present Illness  This is a follow up visit after recent hospitalization.  Endocrinology was consulted for management of hyperglycemia.     Patient presented to the ED 5/31/2024 for leg pain. Previously diagnosed with T2DM ~15 years ago  on Lantus 12 units and Metformin 500 mg BID .  Other PMHx includes- asthma, breast ca, non-small-cell lung cancer on immunotherapy (nivolumab), COPD, DM2, HLD, HTN, nonischemic cardiomyopathy, LBBB s/p CRT-D, PE on eliquis     Discharge Date: 6/3/2025  Etiology of the hyperglycemia: T2DM and  Steroid-induced hyperglycemia  Discharge DM Regimen:   - Lantus 15 units qd  - Novolog 6 units TIDWM (Hold if BG < 100 mg/dL)  - Novolog SSI for BG excursions:  180 - 230 + 2 unit  231- 280  + 4 units  281 - 330 + 6 units  331 - 380 + 8 units      > 380   + 10 units   - Continue Metformin    Glucocorticoid Regimen: Pred taper started 6/4/2025 - 30 mg x3 days, 20 mg x7 days, 15 mg x7 days, 10 mg x7 days    Last endocrinology visit: Dr. Olivarez 9/27/2023:  - Plan to decrease metformin 500 mg one tab twice a day due to dizziness.   - Increased ozempic to 0.5 mg weekly, however good nutrition is really important   - If develops nausea, emesis, diarrhea will stop or reduce dose    Prior medications not tolerated or failed treatments:   Ozempic     Current DM Regimen:   - Novolog 6/7/6 + SSI  - Lantus 15 units QHS  - Metformin 500 mg BID    Missed doses: No     SMBG:  # times a day testing 4x  Log provided: Yes  FPG- 143, 107, 93, 116  Lunch- 357, x (skipped lunch), 282, 95  Dinner- 159, 476, 276, 300  Bedtime- 126, 87, 95, 176    Eats dinner around 4pm, goes to bed by 9pm, sets an alarm for 12:30 to take Lantus and drinks a Glucerna    Hyopglycemia:  Hypoglycemic event at home? No    Current diet:    Breakfast: 1/2 PB sandwich and yogurt, or piece of toast and yogurt  Lunch: depends- today she had okra/tomatoes, yogurt, 1/2 PB sandwich  Dinner: making spaghetti tonight; stir-avitia, pasta, roast with rice and gravy  Snacks: popcorn, potato chips, candy (cutting back), crackers  Drinks: lots of water, sparkling ICE drinks, rarely has soda if she does will do diet or SF    Exercise: Pretty sedentary, mainly just walking around the house; was using a walker but finds she doesn't have to as much anymore       DM Education: Last visit with Diabetes Educator : 11/14/2024    Lifestyle:  Last visit with Diabetes Educator: @BOBBY([id number])@    Diabetes Management Status    Nephropathy: No    ACEi/ARB: Losartan 100 mg    Lab Results    Component Value Date    EGFRNORACEVR >60 06/10/2025    MICALBCREAT 355.0 (H) 10/16/2023     Last Lipid Panel:    Statin: Lipitor 10 mg    Lab Results   Component Value Date    LDLCALC 57.8 (L) 03/21/2025    HDL 43 03/21/2025    CHOL 127 03/21/2025    TRIG 131 03/21/2025     Neuropathy: No    With regards to reason for admit:  Doing better     Review of Systems  Endocrine: No Polyuria polydipsia nocturia or vision changes    Objective:   Physical Exam  There is no height or weight on file to calculate BMI.    Physical exam was not performed and vitals were not obtained due to this being a telemedicine (virtual) visit.    Assessment and Plan     Type 2 diabetes mellitus, with long-term current use of insulin  Patient with long-standing history of T2DM previously managed on basal insulin and metformin alone. Admitted to the hospital 5/31 for myalgias and discharged on steroid taper and meal-time insulin.  Blood sugar log reviewed and significant for global hyperglycemia, particularly with lunch and dinner.  Patient has been waking up at 12:30am to take Lantus and drinks a Glucerna as she's afraid of low BG.  Having to use the sliding scale with meals fairly frequently, using up to 12-16 units of Novolog.  Most recent A1c (3/21/25) 6.7%  Will make the following changes based on review of data:  Decrease Lantus to 12 units at night, stop drinking the Glucerna  20% increase in Novolog to 8/9/8 with meals and continue sliding scale  Continue metformin 500 mg BID  Patient would benefit from GLP-1 and has previously been on Ozempic, but she politely declines.  Advised patient to send a blood sugar log in 7-10 days after these changes for review and further adjustment.  Discussed CGM with patient- feel it would be beneficial as on MDI and SMBG 4x daily. Patient agreeable, will send order to [facility]. If patient not comfortable setting up on her own, will put in referral for DM education.  Has f/u visit scheduled with   Dipp 8/5/2025    Adrenal corticosteroid causing adverse effect in therapeutic use  Currently on prednisone taper for myalgias.  Glucocorticoids markedly increase prandial glucoses. Expect steroid taper will help glucose control.  See insulin adjustment as above      BMI 31.0-31.9,adult  Discussed the benefits of GLP-1 therapy with patient but she declines at this time.  Encouraged to focus on dietary efforts    Reviewed goals of therapy are to get the best control we can without hypoglycemia.  Reviewed patient's current insulin regimen. Clarified proper insulin dose and timing in relation to meals, etc. Insulin injection sites and proper rotation instructed.    Advised frequent self blood glucose monitoring.  Patient encouraged to document glucose results and bring them to every clinic visit.    Hypoglycemia precautions discussed. Instructed on precautions before driving.    Close adherence to lifestyle changes recommended.    Periodic follow ups for eye evaluations, foot care and dental care suggested.    Visit today included increased complexity associated with the care of the problems addressed and managing the longitudinal care of the patient due to the serious and/or complex managed problems.    Carla Edwards, PharmD  Clinical Pharmacist - Endocrinology

## 2025-06-17 NOTE — ASSESSMENT & PLAN NOTE
Discussed the benefits of GLP-1 therapy with patient but she declines at this time.  Encouraged to focus on dietary efforts

## 2025-06-17 NOTE — ASSESSMENT & PLAN NOTE
Patient with long-standing history of T2DM previously managed on basal insulin and metformin alone. Admitted to the hospital 5/31 for myalgias and discharged on steroid taper and meal-time insulin.  Blood sugar log reviewed and significant for global hyperglycemia, particularly with lunch and dinner.  Patient has been waking up at 12:30am to take Lantus and drinks a Glucerna as she's afraid of low BG.  Having to use the sliding scale with meals fairly frequently, using up to 12-16 units of Novolog.  Most recent A1c (3/21/25) 6.7%  Will make the following changes based on review of data:  Decrease Lantus to 12 units at night, stop drinking the Glucerna  20% increase in Novolog to 8/9/8 with meals and continue sliding scale  Continue metformin 500 mg BID  Patient would benefit from GLP-1 and has previously been on Ozempic, but she politely declines.  Advised patient to send a blood sugar log in 7-10 days after these changes for review and further adjustment.  Discussed CGM with patient- feel it would be beneficial as on MDI and SMBG 4x daily. Patient agreeable, will send order to [facility]. If patient not comfortable setting up on her own, will put in referral for DM education.  Has f/u visit scheduled with Dr. Olivarez 8/5/2025

## 2025-06-17 NOTE — ASSESSMENT & PLAN NOTE
Currently on prednisone taper for myalgias.  Glucocorticoids markedly increase prandial glucoses. Expect steroid taper will help glucose control.  See insulin adjustment as above

## 2025-06-18 ENCOUNTER — TELEPHONE (OUTPATIENT)
Dept: RHEUMATOLOGY | Facility: CLINIC | Age: 70
End: 2025-06-18
Payer: MEDICARE

## 2025-06-18 DIAGNOSIS — I10 BENIGN ESSENTIAL HTN: ICD-10-CM

## 2025-06-18 RX ORDER — METOPROLOL SUCCINATE 100 MG/1
100 TABLET, EXTENDED RELEASE ORAL
Qty: 90 TABLET | Refills: 3 | Status: SHIPPED | OUTPATIENT
Start: 2025-06-18

## 2025-06-18 RX ORDER — LOSARTAN POTASSIUM 100 MG/1
100 TABLET ORAL
Qty: 90 TABLET | Refills: 3 | Status: SHIPPED | OUTPATIENT
Start: 2025-06-18

## 2025-06-18 NOTE — PROGRESS NOTES
I have reviewed and concur with Carla Edwards, clinical pharmacologist's, history, physical, assessment, and plan.  I have personally interviewed and examined the patient and reviewed PharmD note regarding medication adjustments. These have been incorporated into my treatment plan     Plan to adjust meal time insulin as she is still on steroid taper   Does not want to restart glp 1 agonist   Plan to resend log in a few weeks.       Physical exam was not performed and vitals were not obtained due to this being a telemedicine (virtual) visit.    The patient location is: home/[place]  The chief complaint leading to consultation is: see Ms. Grace   Visit type: Virtual visit with synchronous audio and video  Total time spent with patient: 11 min    RTC in 3 months    Lanette Olivarez MD

## 2025-06-18 NOTE — TELEPHONE ENCOUNTER
No care due was identified.  [facility] Embedded Care Due Messages. Reference number: [id number].   6/18/2025 12:30:35 AM CDT

## 2025-06-19 ENCOUNTER — LAB VISIT (OUTPATIENT)
Dept: LAB | Facility: HOSPITAL | Age: 70
End: 2025-06-19
Attending: INTERNAL MEDICINE
Payer: MEDICARE

## 2025-06-19 ENCOUNTER — OFFICE VISIT (OUTPATIENT)
Dept: HEMATOLOGY/ONCOLOGY | Facility: CLINIC | Age: 70
End: 2025-06-19
Payer: MEDICARE

## 2025-06-19 VITALS
BODY MASS INDEX: 31.72 KG/M2 | DIASTOLIC BLOOD PRESSURE: 59 MMHG | HEIGHT: 62 IN | HEART RATE: 84 BPM | WEIGHT: 172.38 LBS | OXYGEN SATURATION: 95 % | TEMPERATURE: 98 F | RESPIRATION RATE: 18 BRPM | SYSTOLIC BLOOD PRESSURE: 123 MMHG

## 2025-06-19 DIAGNOSIS — I26.99 PULMONARY EMBOLISM, UNSPECIFIED CHRONICITY, UNSPECIFIED PULMONARY EMBOLISM TYPE, UNSPECIFIED WHETHER ACUTE COR PULMONALE PRESENT: ICD-10-CM

## 2025-06-19 DIAGNOSIS — I82.431 DEEP VEIN THROMBOSIS (DVT) OF POPLITEAL VEIN OF RIGHT LOWER EXTREMITY, UNSPECIFIED CHRONICITY: ICD-10-CM

## 2025-06-19 DIAGNOSIS — F32.9 REACTIVE DEPRESSION: ICD-10-CM

## 2025-06-19 DIAGNOSIS — I44.7 LEFT BUNDLE-BRANCH BLOCK: ICD-10-CM

## 2025-06-19 DIAGNOSIS — I42.0 DILATED CARDIOMYOPATHY: ICD-10-CM

## 2025-06-19 DIAGNOSIS — C34.91 NSCLC OF RIGHT LUNG: ICD-10-CM

## 2025-06-19 DIAGNOSIS — C79.51 MALIGNANT NEOPLASM METASTATIC TO BONE: ICD-10-CM

## 2025-06-19 DIAGNOSIS — J70.0 RADIATION PNEUMONITIS: ICD-10-CM

## 2025-06-19 DIAGNOSIS — M19.90 INFLAMMATORY ARTHRITIS: ICD-10-CM

## 2025-06-19 DIAGNOSIS — R53.83 FATIGUE, UNSPECIFIED TYPE: ICD-10-CM

## 2025-06-19 DIAGNOSIS — K52.9 COLITIS: ICD-10-CM

## 2025-06-19 DIAGNOSIS — C34.91 NSCLC OF RIGHT LUNG: Primary | ICD-10-CM

## 2025-06-19 DIAGNOSIS — M89.8X5 LYTIC BONE LESION OF LEFT FEMUR: ICD-10-CM

## 2025-06-19 DIAGNOSIS — C77.1 SECONDARY AND UNSPECIFIED MALIGNANT NEOPLASM OF INTRATHORACIC LYMPH NODES: ICD-10-CM

## 2025-06-19 DIAGNOSIS — M79.89 FOOT SWELLING: ICD-10-CM

## 2025-06-19 DIAGNOSIS — Z85.3 HISTORY OF BREAST CANCER IN FEMALE: ICD-10-CM

## 2025-06-19 DIAGNOSIS — Z95.810 CARDIAC RESYNCHRONIZATION THERAPY DEFIBRILLATOR (CRT-D) IN PLACE: ICD-10-CM

## 2025-06-19 LAB
ABSOLUTE NEUTROPHIL COUNT (OHS): 9.79 K/UL (ref 1.8–7.7)
ALBUMIN SERPL BCP-MCNC: 2.9 G/DL (ref 3.5–5.2)
ALP SERPL-CCNC: 76 UNIT/L (ref 40–150)
ALT SERPL W/O P-5'-P-CCNC: 12 UNIT/L (ref 10–44)
ANION GAP (OHS): 12 MMOL/L (ref 8–16)
AST SERPL-CCNC: 10 UNIT/L (ref 11–45)
BILIRUB SERPL-MCNC: 0.5 MG/DL (ref 0.1–1)
BUN SERPL-MCNC: 23 MG/DL (ref 8–23)
CALCIUM SERPL-MCNC: 10 MG/DL (ref 8.7–10.5)
CHLORIDE SERPL-SCNC: 103 MMOL/L (ref 95–110)
CO2 SERPL-SCNC: 27 MMOL/L (ref 23–29)
CREAT SERPL-MCNC: 0.9 MG/DL (ref 0.5–1.4)
ERYTHROCYTE [DISTWIDTH] IN BLOOD BY AUTOMATED COUNT: 21.8 % (ref 11.5–14.5)
GFR SERPLBLD CREATININE-BSD FMLA CKD-EPI: >60 ML/MIN/1.73/M2
GLUCOSE SERPL-MCNC: 83 MG/DL (ref 70–110)
HCT VFR BLD AUTO: 33 % (ref 37–48.5)
HGB BLD-MCNC: 9.4 GM/DL (ref 12–16)
IMM GRANULOCYTES # BLD AUTO: 0.27 K/UL (ref 0–0.04)
MCH RBC QN AUTO: 23.9 PG (ref 27–31)
MCHC RBC AUTO-ENTMCNC: 28.5 G/DL (ref 32–36)
MCV RBC AUTO: 84 FL (ref 82–98)
PLATELET # BLD AUTO: 385 K/UL (ref 150–450)
PMV BLD AUTO: 10.7 FL (ref 9.2–12.9)
POTASSIUM SERPL-SCNC: 4.7 MMOL/L (ref 3.5–5.1)
PROT SERPL-MCNC: 7.3 GM/DL (ref 6–8.4)
RBC # BLD AUTO: 3.93 M/UL (ref 4–5.4)
SODIUM SERPL-SCNC: 142 MMOL/L (ref 136–145)
T4 FREE SERPL-MCNC: 1.11 NG/DL (ref 0.71–1.51)
TSH SERPL-ACNC: 2.1 UIU/ML (ref 0.4–4)
WBC # BLD AUTO: 11.93 K/UL (ref 3.9–12.7)

## 2025-06-19 PROCEDURE — 82435 ASSAY OF BLOOD CHLORIDE: CPT | Mod: HCNC

## 2025-06-19 PROCEDURE — 84443 ASSAY THYROID STIM HORMONE: CPT | Mod: HCNC

## 2025-06-19 PROCEDURE — 85027 COMPLETE CBC AUTOMATED: CPT | Mod: HCNC

## 2025-06-19 PROCEDURE — 36415 COLL VENOUS BLD VENIPUNCTURE: CPT | Mod: HCNC

## 2025-06-19 PROCEDURE — 99999 PR PBB SHADOW E&M-EST. PATIENT-LVL V: CPT | Mod: PBBFAC,HCNC,, | Performed by: NURSE PRACTITIONER

## 2025-06-19 PROCEDURE — 84439 ASSAY OF FREE THYROXINE: CPT | Mod: HCNC

## 2025-06-19 NOTE — PROGRESS NOTES
ONCOLOGY FOLLOW UP VISIT.       Cancer/Stage/TNM:    Cancer Staging   NSCLC of right lung  Staging form: Lung, AJCC 8th Edition  - Clinical stage from 9/29/2020: Stage IIIA (cT3, cN1, cM0) - Signed by Ramo Tucker MD on 10/24/2020  - Clinical stage from 7/31/2024: Stage DEREK (cT3, cN2, cM1a) - Signed by Javi Avina MD on 8/2/2024         Oncology History   NSCLC of right lung   9/29/2020 Cancer Staged    Staging form: Lung, AJCC 8th Edition  - Clinical stage from 9/29/2020: Stage IIIA (cT3, cN1, cM0)     10/14/2020 Initial Diagnosis    NSCLC of right lung     11/17/2020 - 12/30/2020 Radiation Therapy    Treating physician: Harvinder Lara     Site  Technique  Energy  Dose/Fx (Gy)  #Fx  Total Dose (Gy)    RLL Lung  VMAT  6X  2  30 / 30  60         2/6/2023 -  Chemotherapy    Treatment Summary   Plan Name: OP NSCLC NIVOLUMAB 360 MG D1 & D22 WITH IPILIMUMAB 1 MG/KG Q6W PLUS CARBOPLATIN (AUC) PACLITAXEL Q3W X2 DOSES  Treatment Goal: Control  Status: Active  Start Date: 2/6/2023  End Date: 8/12/2025 (Planned)  Provider: Javi Avina MD  Chemotherapy: CARBOplatin (PARAPLATIN) 525 mg in sodium chloride 0.9% 337.5 mL chemo infusion, 525 mg, Intravenous, Clinic/HOD 1 time, 1 of 1 cycle  Administration: 525 mg (2/6/2023), 490 mg (2/27/2023)  PACLitaxeL (TAXOL) 200 mg/m2 = 396 mg in sodium chloride 0.9% 500 mL chemo infusion, 200 mg/m2 = 396 mg (100 % of original dose 200 mg/m2), Intravenous, Clinic/HOD 1 time, 1 of 1 cycle  Dose modification: 200 mg/m2 (original dose 200 mg/m2, Cycle 1), 200 mg/m2 (original dose 200 mg/m2, Cycle 1)  Administration: 396 mg (2/6/2023), 396 mg (2/27/2023)     6/12/2023 - 6/30/2023 Radiation Therapy    Treating physician: Harvinder Vásquez Technique Energy Dose/Fx (Gy) #Fx Total Dose (Gy)   RLL Lung RtLung 6X 4 15 / 15 60        7/31/2024 Cancer Staged    Staging form: Lung, AJCC 8th Edition  - Clinical stage from 7/31/2024: Stage DEREK (cT3, cN2, cM1a)          Interval  History:   Today, patient reports waking up with minimal pain but yesterday she could barely walk the day prior. Currently on 15 mg. Experiences more pain than stiffness in her shoulders, neck, both knees, and ankles. Takes NSAIDs BID and uses Norco rarely. Feet and ankles are still swollen and elevation does not help much but swelling is overall improved since her hospitalization. No diarrhea since hospitalization.       ROS:  Review of Systems   Constitutional:  Positive for fatigue. Negative for activity change, appetite change, chills, fever and unexpected weight change.   HENT:  Negative for ear pain, facial swelling, hearing loss, mouth sores, nosebleeds, sore throat and trouble swallowing.    Eyes:  Negative for pain, discharge, redness and visual disturbance.   Respiratory:  Negative for cough, chest tightness and shortness of breath.    Cardiovascular:  Positive for leg swelling. Negative for chest pain and palpitations.   Gastrointestinal:  Negative for abdominal distention, abdominal pain, blood in stool, constipation, diarrhea, nausea and vomiting.   Endocrine: Negative for cold intolerance and heat intolerance.   Genitourinary:  Negative for decreased urine volume, difficulty urinating, dysuria, frequency, hematuria, hot flashes, urgency and vaginal bleeding.   Musculoskeletal:  Positive for arthralgias. Negative for gait problem, leg pain and myalgias.   Integumentary:  Negative for pallor, rash and wound.   Allergic/Immunologic: Negative for immunocompromised state.   Neurological:  Negative for dizziness, tremors, weakness, light-headedness, numbness and headaches.   Hematological:  Negative for adenopathy. Does not bruise/bleed easily.   Psychiatric/Behavioral:  Negative for agitation, confusion, dysphoric mood and sleep disturbance. The patient is nervous/anxious.             A complete 12-point review of systems was reviewed and is negative except as mentioned above.     Past Medical History:    Past Medical History:   Diagnosis Date    AICD (automatic cardioverter/defibrillator) present     Asthma     bronchitis in past    Breast cancer 2016    right    Cardiac pacemaker     Cardiomyopathy     COPD (chronic obstructive pulmonary disease)     Diabetes mellitus, type 2     Hyperglycemia     Hyperlipidemia     Hypertension     Malignant neoplasm of overlapping sites of female breast 2/12/2016    Nuclear sclerosis of both eyes 8/12/2020    Respiratory distress 3/12/2020        Allergies:   Review of patient's allergies indicates:   Allergen Reactions    Taxol [paclitaxel]      Hypersensitivity reaction to taxol, symptoms included shortness of breath, nausea, dizziness, flushing     Carboplatin Other (See Comments)     Itching and hives    Adhesive Rash     tegaderm burns and blistered skin        Medications:   Current Outpatient Medications   Medication Sig Dispense Refill    albuterol (VENTOLIN HFA) 90 mcg/actuation inhaler Inhale 2 puffs into the lungs every 4 (four) hours as needed for Wheezing or Shortness of Breath. Rescue 18 g 4    amLODIPine (NORVASC) 5 MG tablet TAKE 1 TABLET BY MOUTH EVERY DAY 90 tablet 2    apixaban (ELIQUIS) 5 mg Tab Take 1 tablet (5 mg total) by mouth 2 (two) times daily. 180 tablet 2    atorvastatin (LIPITOR) 10 MG tablet TAKE 1 TABLET BY MOUTH EVERY DAY 90 tablet 1    buPROPion (WELLBUTRIN XL) 150 MG TB24 tablet Take 1 tablet (150 mg total) by mouth once daily. 90 tablet 1    cholecalciferol, vitamin D3, (VITAMIN D3) 50 mcg (2,000 unit) Tab Take 1 tablet (2,000 Units total) by mouth once daily. 30 tablet 11    diclofenac sodium (VOLTAREN ARTHRITIS PAIN) 1 % Gel Apply 2 g topically 4 (four) times daily. 100 g 0    furosemide (LASIX) 20 MG tablet Take 1 tablet (20 mg total) by mouth once daily. for 5 days 5 tablet 0    HYDROcodone-acetaminophen (NORCO) 5-325 mg per tablet Take 1 tablet by mouth every 6 (six) hours as needed for Pain. 14 tablet 0    insulin aspart U-100 (NOVOLOG)  "100 unit/mL (3 mL) InPn pen Inject 8 units with breakfast, 9 units with lunch, and 8 units with dinner. Plus sliding scale as needed. TDD 40 units 15 mL 3    insulin glargine U-100, Lantus, (LANTUS SOLOSTAR U-100 INSULIN) 100 unit/mL (3 mL) InPn pen Inject 12 Units into the skin every evening. 15 mL 3    losartan (COZAAR) 100 MG tablet TAKE 1 TABLET BY MOUTH EVERY DAY 90 tablet 3    metFORMIN (GLUCOPHAGE) 500 MG tablet Take 1 tablet (500 mg total) by mouth 2 (two) times daily with meals. Needs appointment for future refills 180 tablet 0    metoprolol succinate (TOPROL-XL) 100 MG 24 hr tablet TAKE 1 TABLET BY MOUTH EVERY DAY 90 tablet 3    pantoprazole (PROTONIX) 40 MG tablet TAKE 1 TABLET BY MOUTH EVERY DAY 90 tablet 3    predniSONE (DELTASONE) 10 MG tablet Take 3 tablets (30 mg total) by mouth once daily for 3 days, THEN 2 tablets (20 mg total) once daily for 7 days, THEN 1.5 tablets (15 mg total) once daily for 7 days, THEN 1 tablet (10 mg total) once daily for 7 days. 41 tablet 0    sulindac (CLINORIL) 200 MG Tab Take 1 tablet (200 mg total) by mouth 2 (two) times daily. 60 tablet 0    tiotropium (SPIRIVA WITH HANDIHALER) 18 mcg inhalation capsule INHALE THE CONTENTS OF 1 CAPSULE BY MOUTH EVERY DAY 90 capsule 3    WIXELA INHUB 250-50 mcg/dose diskus inhaler INHALE 1 PUFF INTO THE LUNGS 2 (TWO) TIMES DAILY. CONTROLLER 180 each 3    ACCU-CHEK ALFRED PLUS TEST STRP Strp USE TO TEST THREE TIMES DAILY 200 strip 3    acetaminophen (TYLENOL) 500 MG tablet Take 2 tablets (1,000 mg total) by mouth every 8 (eight) hours as needed for Pain. (Patient not taking: Reported on 6/19/2025)      albuterol (ACCUNEB) 1.25 mg/3 mL Nebu use 1 AMPULE (3ml) via NEBULIZER EVERY 6 HOURS AS NEEDED (Patient not taking: Reported on 6/19/2025) 300 mL 3    BD ULTRA-FINE GIN PEN NEEDLE 32 gauge x 5/32" Ndle For once daily insulin injections 50 each 3    cetirizine (ZYRTEC) 10 MG tablet Take 10 mg by mouth every evening.  (Patient not taking: " "Reported on 6/19/2025)      LIDOcaine (LIDODERM) 5 % Place 1 patch onto the skin once daily. Remove & Discard patch within 12 hours or as directed by MD (Patient not taking: Reported on 6/19/2025) 14 patch 0    multivitamin (THERAGRAN) per tablet Take 1 tablet by mouth once daily. (Patient not taking: Reported on 6/19/2025)      ondansetron (ZOFRAN-ODT) 8 MG TbDL Take 1 tablet (8 mg total) by mouth every 8 (eight) hours as needed (nausea/vomiting). Take 1 tablet (8 mg) by mouth every 8 hours as needed for nausea/vomiting. (Patient not taking: Reported on 6/19/2025) 60 tablet 5    pen needle, diabetic 32 gauge x 5/32" Ndle USE AS DIRECTED WITH INSULIN 4 TIMES DAILY 100 each 0    [Paused] sertraline (ZOLOFT) 100 MG tablet TAKE 1 TABLET BY MOUTH EVERY DAY IN THE EVENING (Patient not taking: Reported on 6/19/2025) 90 tablet 3     No current facility-administered medications for this visit.     Facility-Administered Medications Ordered in Other Visits   Medication Dose Route Frequency Provider Last Rate Last Admin    fentaNYL injection 25 mcg  25 mcg Intravenous Q5 Min PRKeesha Mirza MD        haloperidol lactate injection 0.5 mg  0.5 mg Intravenous Once PRKeesha Mirza MD        HYDROmorphone injection 0.2 mg  0.2 mg Intravenous Q5 Min PRKeesha Mirza MD        ondansetron injection 4 mg  4 mg Intravenous Once PRKeesha Mirza MD        sodium chloride 0.9% flush 10 mL  10 mL Intravenous PRN Keesha Martins MD            Physical Exam:   BP (!) 123/59 (BP Location: Right arm, Patient Position: Sitting)   Pulse 84   Temp 98.3 °F (36.8 °C) (Oral)   Resp 18   Ht 5' 2" (1.575 m)   Wt 78.2 kg (172 lb 6.4 oz)   LMP  (LMP Unknown)   SpO2 95%   BMI 31.53 kg/m²      ECOG Performance Status: (foot note - ECOG PS provided by [employer] Co[employer]cology Group) 0 - Asymptomatic    Physical Exam  Vitals and nursing note reviewed.   Constitutional:       Appearance: Normal appearance. She is well-developed and normal weight. "   HENT:      Head: Normocephalic and atraumatic.   Eyes:      Conjunctiva/sclera: Conjunctivae normal.      Pupils: Pupils are equal, round, and reactive to light.   Pulmonary:      Effort: Pulmonary effort is normal. No respiratory distress.   Abdominal:      General: There is no distension.      Palpations: Abdomen is soft.   Musculoskeletal:         General: No swelling. Normal range of motion.      Cervical back: Normal range of motion and neck supple.      Right lower le+ Edema present.      Left lower le+ Edema present.      Right foot: Swelling (+3) present.      Left foot: Swelling (+3) present.   Lymphadenopathy:      Cervical: No cervical adenopathy.   Skin:     General: Skin is warm and dry.   Neurological:      General: No focal deficit present.      Mental Status: She is alert and oriented to person, place, and time.   Psychiatric:         Mood and Affect: Mood and affect normal.         Behavior: Behavior normal.                     Labs:   Recent Results (from the past 48 hours)   CBC Oncology    Collection Time: 25 11:59 AM   Result Value Ref Range    WBC 11.93 3.90 - 12.70 K/uL    RBC 3.93 (L) 4.00 - 5.40 M/uL    HGB 9.4 (L) 12.0 - 16.0 gm/dL    HCT 33.0 (L) 37.0 - 48.5 %    MCV 84 82 - 98 fL    MCH 23.9 (L) 27.0 - 31.0 pg    MCHC 28.5 (L) 32.0 - 36.0 g/dL    RDW 21.8 (H) 11.5 - 14.5 %    Platelet Count 385 150 - 450 K/uL    MPV 10.7 9.2 - 12.9 fL    Neut # 9.79 (H) 1.8 - 7.7 K/uL    Imm Grans # 0.27 (H) 0.00 - 0.04 K/uL   Comprehensive Metabolic Panel    Collection Time: 25 11:59 AM   Result Value Ref Range    Sodium 142 136 - 145 mmol/L    Potassium 4.7 3.5 - 5.1 mmol/L    Chloride 103 95 - 110 mmol/L    CO2 27 23 - 29 mmol/L    Glucose 83 70 - 110 mg/dL    BUN 23 8 - 23 mg/dL    Creatinine 0.9 0.5 - 1.4 mg/dL    Calcium 10.0 8.7 - 10.5 mg/dL    Protein Total 7.3 6.0 - 8.4 gm/dL    Albumin 2.9 (L) 3.5 - 5.2 g/dL    Bilirubin Total 0.5 0.1 - 1.0 mg/dL    ALP 76 40 - 150 unit/L     AST 10 (L) 11 - 45 unit/L    ALT 12 10 - 44 unit/L    Anion Gap 12 8 - 16 mmol/L    eGFR >60 >60 mL/min/1.73/m2   T4, Free    Collection Time: 06/19/25 11:59 AM   Result Value Ref Range    Free T4 1.11 0.71 - 1.51 ng/dL   TSH    Collection Time: 06/19/25 11:59 AM   Result Value Ref Range    TSH 2.103 0.400 - 4.000 uIU/mL                    Imaging:   X-Ray Knee 1 or 2 View Left  EXAMINATION:  XR KNEE 1 OR 2 VIEW LEFT    CLINICAL HISTORY:  possible reactive arthritis, seronegative spondyloarthritis;    FINDINGS:  Knee three views left.    There is chondrocalcinosis.  There is an intramedullary sade in the femur.  There is a small joint effusion.  No fracture dislocation bone destruction seen.  No acute trauma seen.    Electronically signed by: Dean Miller MD  Date:    06/02/2025  Time:    16:07  X-ray Shoulder 2 or More Views Right  EXAMINATION:  XR SHOULDER COMPLETE 2 OR MORE VIEWS RIGHT    CLINICAL HISTORY:  possible inflammatory arthritis/tendinitis;    FINDINGS:  Shoulder complete three views right.    There is baseline DJD.  No fracture dislocation bone destruction seen.  There is a pacemaker.  No acute process seen.  No acute fracture, dislocation, or bone destruction seen.  No trauma seen.    Electronically signed by: Dean Miller MD  Date:    06/02/2025  Time:    16:06            Diagnoses:       1. NSCLC of right lung    2. Secondary and unspecified malignant neoplasm of intrathoracic lymph nodes    3. Lytic bone lesion of left femur s/p IM nail on 8/20/2024    4. Colitis    5. Radiation pneumonitis    6. History of breast cancer in female    7. Dilated cardiomyopathy    8. Left bundle-branch block    9. Cardiac resynchronization therapy defibrillator (CRT-D) in place    10. Malignant neoplasm metastatic to bone    11. Deep vein thrombosis (DVT) of popliteal vein of right lower extremity, unspecified chronicity    12. Pulmonary embolism, unspecified chronicity, unspecified pulmonary embolism type,  unspecified whether acute cor pulmonale present    13. Inflammatory arthritis    14. Foot swelling    15. Reactive depression        Assessment and Plan:         Recurrent NSCLC (bone and LN mets)  Plan is for definitive chemoRT, will need re-staging scans prior.  Reviewed with patient in detail her diagnosis, stage, treatment options, and prognosis (3 year OS 66% vs. 43.5% without durvalumab) with standard of care chemoRT followed by maintenance immunotherapy.  Patient has consented for [id number] clinical trial, a randomized control trial evaluating combination chemoRT + durvalumab followed by maintenance vs. SOC chemoRT -> maintenance.  CT scans 7/2021 with CR.  - patient randomized on [id number] to SOC arm (Imfinzi) - completed 12/2021.    - CT scan 12/2022 with progressing pulmonary nodule in LLL, still with stable disease in RLL consolidation. PET scan also showing enlarging right lower lobe mass with increased hypermetabolic activity concerning for recurrence. Will present her case at the next thoracic tumor board to discuss biopsy and possible treatment options.   - Biopsy of lung mass consistent with SCC. Initiated on 9LA. Initial re-staging scans with stable disease.   - Patient has completed RT 4-5 weeks ago and has no symptoms. She has some mild inflammation on Chest CT, but since asymptomatic and completed RT will re-initiate IO.  - Now s/p 3 cycles of 9LA. With persistent IO induced rash and continued pneumonitis on recent imaging, plan to hold IO and proceed with surveillance.  - 10/16/23 CT showing continued evidence of inflammation/infection. Currently has cold symptoms. +Covid.   - Repeat CT scan (preliminary reading) shows improvement in inflammation but indeterminate possible new liver metastases. Will confirm with PETCT.  - CT CAP is showing progression of disease in L hilum, hilar LN, mediastinal LN, and pleural effusion.   - Plan is to restart ipi + nivo. Not eligible for any trials.   - Holding Ipi  for colitis. Has now developed inflammatory arthritis on Opdivo monotherapy - holding treatment. Due for imaging in July.      Left femur lytic lesion  - S/p left femur nailing. Will get bone scans with re-staging to evaluate for metastatic bone disease. Awaiting dental clearance for Zometa use - not cleared for bisphosphonate (needs 4 extractions).    Colitis  - Grade 4 needing hospitalization. Required multiple days of steroids prior to resolution of symptoms.   - Due for maintenance Entyvio (dose #4) tomorrow. Had mild symptoms of recurrence prior to recent hospitalization and no current diarrhea but calprotectin on 6/3 was 1340. Discussed resuming Entyvio next week to avoid any worsening of this issue as she tapers off steroids.     Radiation Pneumonitis  - Resolved. First noted in April 2021 and resolved with steroids. Discussed symptoms to report with re-challenging immunotherapy.     History of breast cancer  - Stage 1A hormone positive HER2 negative IDC s/p lumpectomy 2016.      Dilated Cardiomyopathy  Left Bundle branch block  AICD in place  - Stable, follows with cardiology. AICD exchanged 11/2024.     DVT of RLE  PE  - DVT right popliteal vein in October 2024. Now with PE. Patient only took starter package of Eliquis for DVT. Instructed to restart Eliquis and will need to continue for 6 months at least. Will repeat CTA and US in October.     Inflammatory Arthritis  - arthrocentesis of left knee was not consistent with CPPD, suspect inflammatory arthritis associated with ICI. Will hold further immunotherapy at this time. Continue current steroid taper. Has f/u with Rheumatology next week. May consider giving Actemra should this issue be more problematic than colitis.    Swelling of feet and ankles  - discussed use of compression stockings and elevation. Compliant with Eliquis. Instructed to discontinue amlodipine.     Depression  - Continue Wellbutrin. Will discuss Hem/Onc psych f/u outpatient.       Patient is in agreement with the proposed treatment plan. All questions were answered to the patient's satisfaction. Pt knows to call clinic if anything is needed before the next clinic visit.    Aide Dumont, MSN, APRN, FNP-C  Hematology and Medical Oncology  Nurse Practitioner to Dr. Javi Avina  Nurse Practitioner, [facility]            Route Chart for Scheduling    Med Onc Chart Routing      Follow up with physician 4 weeks. review imaging   Follow up with DANIEL    Infusion scheduling note    Injection scheduling note    Labs CBC, CMP, free T4 and TSH   Scheduling:  Preferred lab:  Lab interval:     Imaging CT chest abdomen pelvis      Pharmacy appointment    Other referrals              Treatment Plan Information   OP NSCLC NIVOLUMAB 360 MG D1 & D22 WITH IPILIMUMAB 1 MG/KG Q6W PLUS CARBOPLATIN (AUC) PACLITAXEL Q3W X2 DOSES Javi Avina MD   Associated diagnosis: Lung mass   noted on 3/12/2020  Associated diagnosis: NSCLC of right lung Stage IIIA, Stage DEREK cT3, cN1, cM0, cT3, cN2, cM1a noted on 10/14/2020   Line of treatment: First Line  Treatment Goal: Control     Upcoming Treatment Dates - OP NSCLC NIVOLUMAB 360 MG D1 & D22 WITH IPILIMUMAB 1 MG/KG Q6W PLUS CARBOPLATIN (AUC) PACLITAXEL Q3W X2 DOSES    5/20/2025       Immunotherapy       nivolumab 480 mg in 0.9% NaCl 98 mL infusion  6/17/2025       Immunotherapy       nivolumab 480 mg in 0.9% NaCl 98 mL infusion  7/15/2025       Immunotherapy       nivolumab 480 mg in 0.9% NaCl 98 mL infusion  8/12/2025       Immunotherapy       nivolumab 480 mg in 0.9% NaCl 98 mL infusion    Supportive Plan Information  OP ZOLEDRONIC ACID (ZOMETA) Q4W Aide Magaña NP   Associated Diagnosis: Malignant neoplasm metastatic to bone   noted on 8/21/2024   Line of treatment: Supportive Care   Treatment goal: Supportive     Upcoming Treatment Dates - OP ZOLEDRONIC ACID (ZOMETA) Q4W    9/16/2024       Medications       zoledronic 4 mg/100  mL infusion 4 mg  10/14/2024       Medications       zoledronic 4 mg/100 mL infusion 4 mg  11/11/2024       Medications       zoledronic 4 mg/100 mL infusion 4 mg  12/9/2024       Medications       zoledronic 4 mg/100 mL infusion 4 mg    Therapy Plan Information  ENTYVIO GI for Colitis, noted on 10/1/2024  Pre-Medications  acetaminophen tablet 650 mg  650 mg, Oral, Every visit  cetirizine tablet 10 mg  10 mg, Oral, Every visit  Medications  vedolizumab (ENTYVIO) 300 mg/250 mL NS IVPB  300 mg, Intravenous, Every 8 weeks  PRN Medications  acetaminophen tablet 650 mg  650 mg, Oral, PRN  albuterol-ipratropium 2.5 mg-0.5 mg/3 mL nebulizer solution 3 mL  3 mL, Nebulization, PRN  methylPREDNISolone sodium succinate injection 40 mg  40 mg, Intravenous, PRN  EPINEPHrine (PF) injection 0.3 mg  0.3 mg, Subcutaneous, PRN  Flushes  0.9% NaCl 250 mL flush bag  Intravenous, Every visit  sodium chloride 0.9% flush 10 mL  10 mL, Intravenous, Every visit  heparin, porcine (PF) 100 unit/mL injection flush 500 Units  500 Units, Intravenous, Every visit  alteplase injection 2 mg  2 mg, Intra-Catheter, Every visit      No therapy plan of the specified type found.    No therapy plan of the specified type found.

## 2025-06-20 ENCOUNTER — INFUSION (OUTPATIENT)
Dept: INFUSION THERAPY | Facility: HOSPITAL | Age: 70
End: 2025-06-20
Payer: MEDICARE

## 2025-06-20 VITALS
RESPIRATION RATE: 18 BRPM | SYSTOLIC BLOOD PRESSURE: 143 MMHG | HEIGHT: 62 IN | HEART RATE: 78 BPM | TEMPERATURE: 98 F | WEIGHT: 172.38 LBS | DIASTOLIC BLOOD PRESSURE: 65 MMHG | BODY MASS INDEX: 31.72 KG/M2

## 2025-06-20 DIAGNOSIS — K52.9 COLITIS: Primary | ICD-10-CM

## 2025-06-20 PROCEDURE — 63600175 PHARM REV CODE 636 W HCPCS: Mod: JZ,TB,HCNC | Performed by: INTERNAL MEDICINE

## 2025-06-20 PROCEDURE — 96365 THER/PROPH/DIAG IV INF INIT: CPT | Mod: HCNC

## 2025-06-20 PROCEDURE — 25000003 PHARM REV CODE 250: Mod: HCNC | Performed by: INTERNAL MEDICINE

## 2025-06-20 RX ORDER — SODIUM CHLORIDE 0.9 % (FLUSH) 0.9 %
10 SYRINGE (ML) INJECTION
Status: DISCONTINUED | OUTPATIENT
Start: 2025-06-20 | End: 2025-06-20 | Stop reason: HOSPADM

## 2025-06-20 RX ORDER — HEPARIN 100 UNIT/ML
500 SYRINGE INTRAVENOUS
Status: DISCONTINUED | OUTPATIENT
Start: 2025-06-20 | End: 2025-06-20 | Stop reason: HOSPADM

## 2025-06-20 RX ORDER — ACETAMINOPHEN 325 MG/1
650 TABLET ORAL ONCE
OUTPATIENT
Start: 2025-07-18 | End: 2025-07-18

## 2025-06-20 RX ORDER — METHYLPREDNISOLONE SOD SUCC 125 MG
40 VIAL (EA) INJECTION
OUTPATIENT
Start: 2025-07-18

## 2025-06-20 RX ORDER — SODIUM CHLORIDE 0.9 % (FLUSH) 0.9 %
10 SYRINGE (ML) INJECTION
OUTPATIENT
Start: 2025-07-18

## 2025-06-20 RX ORDER — EPINEPHRINE 1 MG/ML
0.3 INJECTION, SOLUTION, CONCENTRATE INTRAVENOUS
OUTPATIENT
Start: 2025-07-18

## 2025-06-20 RX ORDER — CETIRIZINE HYDROCHLORIDE 10 MG/1
10 TABLET ORAL ONCE
OUTPATIENT
Start: 2025-07-18 | End: 2025-07-18

## 2025-06-20 RX ORDER — HEPARIN 100 UNIT/ML
500 SYRINGE INTRAVENOUS
OUTPATIENT
Start: 2025-07-18

## 2025-06-20 RX ORDER — ACETAMINOPHEN 325 MG/1
650 TABLET ORAL
OUTPATIENT
Start: 2025-07-18

## 2025-06-20 RX ORDER — METHYLPREDNISOLONE SOD SUCC 125 MG
40 VIAL (EA) INJECTION
Status: ACTIVE | OUTPATIENT
Start: 2025-06-20 | End: 2025-06-20

## 2025-06-20 RX ORDER — EPINEPHRINE 0.3 MG/.3ML
0.3 INJECTION SUBCUTANEOUS
Status: ACTIVE | OUTPATIENT
Start: 2025-06-20 | End: 2025-06-20

## 2025-06-20 RX ORDER — IPRATROPIUM BROMIDE AND ALBUTEROL SULFATE 2.5; .5 MG/3ML; MG/3ML
3 SOLUTION RESPIRATORY (INHALATION)
OUTPATIENT
Start: 2025-07-18

## 2025-06-20 RX ORDER — ACETAMINOPHEN 325 MG/1
650 TABLET ORAL ONCE
Status: COMPLETED | OUTPATIENT
Start: 2025-06-20 | End: 2025-06-20

## 2025-06-20 RX ORDER — ACETAMINOPHEN 325 MG/1
650 TABLET ORAL
Status: ACTIVE | OUTPATIENT
Start: 2025-06-20 | End: 2025-06-20

## 2025-06-20 RX ORDER — CETIRIZINE HYDROCHLORIDE 10 MG/1
10 TABLET ORAL ONCE
Status: COMPLETED | OUTPATIENT
Start: 2025-06-20 | End: 2025-06-20

## 2025-06-20 RX ORDER — IPRATROPIUM BROMIDE AND ALBUTEROL SULFATE 2.5; .5 MG/3ML; MG/3ML
3 SOLUTION RESPIRATORY (INHALATION)
Status: DISPENSED | OUTPATIENT
Start: 2025-06-20 | End: 2025-06-20

## 2025-06-20 RX ADMIN — CETIRIZINE HYDROCHLORIDE 10 MG: 10 TABLET, FILM COATED ORAL at 10:06

## 2025-06-20 RX ADMIN — VEDOLIZUMAB 300 MG: 300 INJECTION, POWDER, LYOPHILIZED, FOR SOLUTION INTRAVENOUS at 10:06

## 2025-06-20 RX ADMIN — ACETAMINOPHEN 650 MG: 325 TABLET ORAL at 10:06

## 2025-06-25 ENCOUNTER — PATIENT MESSAGE (OUTPATIENT)
Dept: HEMATOLOGY/ONCOLOGY | Facility: CLINIC | Age: 70
End: 2025-06-25
Payer: MEDICARE

## 2025-07-02 ENCOUNTER — PATIENT MESSAGE (OUTPATIENT)
Dept: HEMATOLOGY/ONCOLOGY | Facility: CLINIC | Age: 70
End: 2025-07-02
Payer: MEDICARE

## 2025-07-02 DIAGNOSIS — S16.1XXA STRAIN OF NECK MUSCLE, INITIAL ENCOUNTER: ICD-10-CM

## 2025-07-02 RX ORDER — HYDROCODONE BITARTRATE AND ACETAMINOPHEN 5; 325 MG/1; MG/1
1 TABLET ORAL EVERY 6 HOURS PRN
Qty: 14 TABLET | Refills: 0 | Status: SHIPPED | OUTPATIENT
Start: 2025-07-02

## 2025-07-03 ENCOUNTER — PATIENT MESSAGE (OUTPATIENT)
Dept: FAMILY MEDICINE | Facility: CLINIC | Age: 70
End: 2025-07-03
Payer: MEDICARE

## 2025-07-07 ENCOUNTER — PATIENT MESSAGE (OUTPATIENT)
Dept: HEMATOLOGY/ONCOLOGY | Facility: CLINIC | Age: 70
End: 2025-07-07
Payer: MEDICARE

## 2025-07-07 ENCOUNTER — PATIENT MESSAGE (OUTPATIENT)
Dept: FAMILY MEDICINE | Facility: CLINIC | Age: 70
End: 2025-07-07
Payer: MEDICARE

## 2025-07-07 DIAGNOSIS — F41.8 ANXIETY WITH DEPRESSION: Primary | ICD-10-CM

## 2025-07-07 DIAGNOSIS — M19.90 INFLAMMATORY ARTHRITIS: Primary | ICD-10-CM

## 2025-07-08 ENCOUNTER — OFFICE VISIT (OUTPATIENT)
Dept: PSYCHIATRY | Facility: CLINIC | Age: 70
End: 2025-07-08
Payer: MEDICARE

## 2025-07-08 ENCOUNTER — TELEPHONE (OUTPATIENT)
Dept: PSYCHIATRY | Facility: CLINIC | Age: 70
End: 2025-07-08
Payer: MEDICARE

## 2025-07-08 DIAGNOSIS — F33.1 MODERATE EPISODE OF RECURRENT MAJOR DEPRESSIVE DISORDER: Primary | ICD-10-CM

## 2025-07-08 DIAGNOSIS — C34.91 NSCLC OF RIGHT LUNG: ICD-10-CM

## 2025-07-08 PROCEDURE — 1159F MED LIST DOCD IN RCRD: CPT | Mod: CPTII,HCNC,95, | Performed by: PSYCHOLOGIST

## 2025-07-08 PROCEDURE — 1157F ADVNC CARE PLAN IN RCRD: CPT | Mod: CPTII,HCNC,95, | Performed by: PSYCHOLOGIST

## 2025-07-08 PROCEDURE — 4010F ACE/ARB THERAPY RXD/TAKEN: CPT | Mod: CPTII,HCNC,95, | Performed by: PSYCHOLOGIST

## 2025-07-08 PROCEDURE — 90832 PSYTX W PT 30 MINUTES: CPT | Mod: HCNC,95,, | Performed by: PSYCHOLOGIST

## 2025-07-08 PROCEDURE — 3044F HG A1C LEVEL LT 7.0%: CPT | Mod: CPTII,HCNC,95, | Performed by: PSYCHOLOGIST

## 2025-07-08 PROCEDURE — 1160F RVW MEDS BY RX/DR IN RCRD: CPT | Mod: CPTII,HCNC,95, | Performed by: PSYCHOLOGIST

## 2025-07-08 NOTE — TELEPHONE ENCOUNTER
Message left for patient due to lack of log-in at scheduled time for video visit. Detailed rescheduling and contact information provided.     CATY Alcaraz PhD

## 2025-07-08 NOTE — PROGRESS NOTES
Telemedicine PSYCHO-ONCOLOGY NOTE/ Individual Psychotherapy     Consultation started: 7/8/2025 at 3:22 PM   The chief complaint leading to consultation is: adaptation to disease and treatment, depression, anxiety  The patient location is: Patient home in [place], [place] (Provider licensed in [place], [place], [place])  Virtual visit with synchronous audio only due to patient challenges checking in. There were changes in the form but not function of the visit due to audio medium, no decrease in standard of care.   Patient alone at the time of consultation     Each patient provided medical services by telemedicine is:  (1) informed of the relationship between the physician and patient and the respective role of any other health care provider with respect to management of the patient; and (2) notified that he or she may decline to receive medical services by telemedicine and may withdraw from such care at any time.    Crisis Disclaimer: Patient was informed that due to the virtual nature of the visit, that if a crisis develops, protocols will be implemented to ensure patient safety, including but not limited to: 1) Initiating a welfare check with local Law Enforcement, 2) Calling 1/National Crisis Hotline, and/or 3) Initiating PEC/CEC procedures.     Date: 7/8/2025   Site of therapist:  [facility]         Therapeutic Intervention: Met with patient.   Outpatient - Behavior modifying psychotherapy 30 min - CPT code 12311 and Outpatient - Supportive psychotherapy 30 min - CPT Code 11039    Chief complaint/reason for encounter: depression, anxiety, and adjustment   Met with patient to evaluate psychosocial adaptation to survivorship of NSCLC    Patient was last seen by me outpatient on 9/26/2024, inpatient on 6/2/25    Medical History:  Patient Active Problem List   Diagnosis    Hypertension associated with diabetes    Type 2 diabetes mellitus without complication    Seasonal allergies    Left bundle-branch block    Dilated  cardiomyopathy    NICM (nonischemic cardiomyopathy)    Lung mass    Refractive error    Nuclear sclerosis of both eyes    Depression    NSCLC of right lung    BMI 31.0-31.9,adult    Abnormal thyroid function test    Moderate episode of recurrent major depressive disorder    Lytic bone lesion of left femur s/p IM nail on 8/20/2024    Hypomagnesemia    Pathological fracture of intertrochanteric section of femur    Anemia    Malignant neoplasm metastatic to bone    Left hip pain    Colitis    Diarrhea    Type 2 diabetes mellitus, with long-term current use of insulin    Presence of automatic (implantable) cardiac defibrillator    Purpura    Chronic obstructive pulmonary disease, unspecified COPD type    Non-pressure chronic ulcer of other part of left lower leg with fat layer exposed    Aorto-iliac atherosclerosis    Secondary and unspecified malignant neoplasm of intrathoracic lymph nodes    Myalgia    Left leg swelling    Immunosuppression    History of colitis    History of CMV    Acute diarrhea    Hyperkalemia    Hypercoagulopathy    Hyponatremia    Adrenal corticosteroid causing adverse effect in therapeutic use       Objective:      Hannah Montoya arrived 20 minutes late for the session (by audio only).  The patient was fully cooperative throughout the session.  Appearance: no visual data   Behavior/Cooperation: friendly and cooperative  Speech: normal in rate, volume, and tone and appropriate quality, quantity and organization of sentences  Mood: dysthymic  Affect: dysphoric  Thought Process: goal-directed, logical  Thought Content: normal,  No delusions or paranoia; did not appear to be responding to internal stimuli during the session  Orientation: grossly intact  Memory: Grossly intact  Attention Span/Concentration: Attends to session without distraction; reports no difficulty  Fund of Knowledge: average  Estimate of Intelligence: average from verbal skills and history  Cognition: grossly  "intact  Insight: patient has awareness of illness; good insight into own behavior and behavior of others  Judgment: the patient's behavior is adequate to circumstances    Current Outpatient Medications   Medication    ACCU-CHEK ALFRED PLUS TEST STRP Strp    acetaminophen (TYLENOL) 500 MG tablet    albuterol (ACCUNEB) 1.25 mg/3 mL Nebu    albuterol (VENTOLIN HFA) 90 mcg/actuation inhaler    amLODIPine (NORVASC) 5 MG tablet    apixaban (ELIQUIS) 5 mg Tab    atorvastatin (LIPITOR) 10 MG tablet    BD ULTRA-FINE GIN PEN NEEDLE 32 gauge x 5/32" Ndle    buPROPion (WELLBUTRIN XL) 150 MG TB24 tablet    cetirizine (ZYRTEC) 10 MG tablet    cholecalciferol, vitamin D3, (VITAMIN D3) 50 mcg (2,000 unit) Tab    diclofenac sodium (VOLTAREN ARTHRITIS PAIN) 1 % Gel    furosemide (LASIX) 20 MG tablet    HYDROcodone-acetaminophen (NORCO) 5-325 mg per tablet    insulin aspart U-100 (NOVOLOG) 100 unit/mL (3 mL) InPn pen    insulin glargine U-100, Lantus, (LANTUS SOLOSTAR U-100 INSULIN) 100 unit/mL (3 mL) InPn pen    LIDOcaine (LIDODERM) 5 %    losartan (COZAAR) 100 MG tablet    metFORMIN (GLUCOPHAGE) 500 MG tablet    metoprolol succinate (TOPROL-XL) 100 MG 24 hr tablet    multivitamin (THERAGRAN) per tablet    ondansetron (ZOFRAN-ODT) 8 MG TbDL    pantoprazole (PROTONIX) 40 MG tablet    pen needle, diabetic 32 gauge x 5/32" Ndle    [Paused] sertraline (ZOLOFT) 100 MG tablet    sulindac (CLINORIL) 200 MG Tab    tiotropium (SPIRIVA WITH HANDIHALER) 18 mcg inhalation capsule    WIXELA INHUB 250-50 mcg/dose diskus inhaler     No current facility-administered medications for this visit.     Facility-Administered Medications Ordered in Other Visits   Medication Frequency    fentaNYL injection 25 mcg Q5 Min PRN    haloperidol lactate injection 0.5 mg Once PRN    HYDROmorphone injection 0.2 mg Q5 Min PRN    ondansetron injection 4 mg Once PRN    sodium chloride 0.9% flush 10 mL PRN       Interval history and content of current session: Patient " "discussed events and activities since the time of last visit. Discussed current adaptation to disease and treatment status and family's adaptation to disease and treatment status. Reports to be coping with significant difficulty. Patient reports increased pain leading to increased irritability and "snapping at everyone." Pain prevents her from sleeping, writing, and using her hand for hobbies (neha, etc). She feels there is "no renuka available" to her. She is able to drive short distances. In discussion, it appears patient's arm pain has not been evaluated by any physician. (She has assumed it was arthritis.)  Encouraged her to discuss with PCP on Friday.    Evaluated cognitive response, paying particular attention to negative intrusive thoughts of a persistent and detrimental nature. Thoughts of this type are in evidence with significant distress. Focused on providing cognitive behavioral therapy to address negative cognitions.     Identified and evaluated psychosocial and environmental stressors (increasing debility). Examined proactive behaviors that may be implemented to minimize or ameliorate psychosocial stressors.     Patient is currently taking Wellbutrin daily (150 mg). She is not taking her Zoloft since 6/3 admission. She is unclear why it was stopped.  She does not think that Wellbutrin alone is sufficient to manage her mood.     Risk parameters:   Patient reports no suicidal ideation  Patient reports no homicidal ideation  Patient reports no self-injurious behavior  Patient reports no violent behavior   Safety needs:  None at this time      Verbal deficits: None     Patient's response to intervention:The patient's response to intervention is accepting.     Progress toward goals and other mental status changes:  The patient's progress toward goals is limited.      Progress to date:No Progress - Continue Objectives      Goals from last visit: Not attempted      Patient reported outcomes:      Distress " Thermometer:       7/3/2025    10:55 AM 6/19/2025    12:49 PM 6/17/2025     7:47 AM 6/10/2025     9:14 AM 6/7/2025     1:34 PM 5/25/2025     4:57 PM 5/21/2025    12:39 PM   DISTRESS SCREENING   Distress Score 4 3 4 7 7 5 5   Practical Concerns Taking care of myself;Finances;Treatment decisions  Taking care of myself;Finances Taking care of myself;Finances;Access to medicine;Treatment decisions Taking care of myself;Finances;Treatment decisions Taking care of myself;Finances;Treatment decisions Taking care of myself;Finances;Treatment decisions   Social Concerns None of these  None of these Communication with health care team None of these None of these None of these   Emotional Concerns Worry or anxiety;Loss of interest or enjoyment;Fear;Feelings of worthlessness or being a burden  Worry or anxiety;Loneliness Worry or anxiety Worry or anxiety Worry or anxiety;Sadness or depression;Fear Worry or anxiety;Sadness or depression;Fear   Spiritual or Buddhism Concerns None of these  None of these None of these None of these None of these None of these   Physical Concerns Pain;Sleep;Loss or change of physical abilities  Pain;Sleep;Loss or change of physical abilities Pain;Sleep;Loss or change of physical abilities Pain;Sleep;Loss or change of physical abilities Pain;Sleep;Fatigue Pain;Sleep;Fatigue          PHQ-9=  Initial visit: 20    PHQ ANSWERS    Q1. Little interest or pleasure in doing things: Nearly every day (07/03/25 1059)  Q2. Feeling down, depressed, or hopeless: More than half the days (07/03/25 1059)  Q3. Trouble falling or staying asleep, or sleeping too much: Several days (07/03/25 1059)  Q4. Feeling tired or having little energy: More than half the days (07/03/25 1059)  Q5. Poor appetite or overeating: Several days (07/03/25 1059)  Q6. Feeling bad about yourself - or that you are a failure or have let yourself or your family down: Several days (07/03/25 1059)  Q7. Trouble concentrating on things, such as  reading the newspaper or watching television: Several days (07/03/25 1059)  Q8. Moving or speaking so slowly that other people could have noticed. Or the opposite - being so fidgety or restless that you have been moving around a lot more than usual: Not at all (07/03/25 1059)  Q9.      PHQ8 Score : 11 (07/03/25 1059)  PHQ-9 Total Score: 11 (07/03/25 1059)        MICAH-7= Initial visit: 18         7/3/2025    10:56 AM   GAD7   1. Feeling nervous, anxious, or on edge? 2   2. Not being able to stop or control worrying? 1   3. Worrying too much about different things? 1   4. Trouble relaxing? 3   5. Being so restless that it is hard to sit still? 0   6. Becoming easily annoyed or irritable? 2   7. Feeling afraid as if something awful might happen? 2   8. If you checked off any problems, how difficult have these problems made it for you to do your work, take care of things at home, or get along with other people? 2   MICAH-7 Score 11         Client Strengths: verbal, intelligent, successful, good social support, commitment to wellness      Treatment Plan:individual psychotherapy and medication management by physician  Target symptoms: depression  Why chosen therapy is appropriate versus another modality: relevant to diagnosis, evidence based practice  Outcome monitoring methods: self-report, checklist/rating scale  Therapeutic intervention type: behavior modifying psychotherapy, supportive psychotherapy  Prognosis: Fair      Behavioral goals:    Exercise:  possible referral to OT/Yoga or PT for debility?   Stress management:   Social engagement:   Nutrition:   Smoking Cessation:   Therapy: See Dr. Chavez on Friday:  antidepressant, arm pain    Future referral to psychiatry? Palliative care Psychiatry?    Return to clinic: 2 weeks     Length of Service (minutes direct face-to-face contact): 30      ICD-10-CM ICD-9-CM   1. Moderate episode of recurrent major depressive disorder  F33.1 296.32   2. NSCLC of right lung  C34.91  162.9         Geovanni Alcaraz, PhD  [place] License #[id number]  [place] License #[id number]  [place] License #[id number]

## 2025-07-10 RX ORDER — SERTRALINE HYDROCHLORIDE 25 MG/1
25 TABLET, FILM COATED ORAL DAILY
Qty: 30 TABLET | Refills: 1 | Status: SHIPPED | OUTPATIENT
Start: 2025-07-10 | End: 2026-07-10

## 2025-07-11 ENCOUNTER — OFFICE VISIT (OUTPATIENT)
Dept: FAMILY MEDICINE | Facility: CLINIC | Age: 70
End: 2025-07-11
Payer: MEDICARE

## 2025-07-11 DIAGNOSIS — M15.0 PRIMARY OSTEOARTHRITIS INVOLVING MULTIPLE JOINTS: ICD-10-CM

## 2025-07-11 DIAGNOSIS — F33.1 MODERATE EPISODE OF RECURRENT MAJOR DEPRESSIVE DISORDER: Primary | ICD-10-CM

## 2025-07-11 DIAGNOSIS — L97.822 NON-PRESSURE CHRONIC ULCER OF OTHER PART OF LEFT LOWER LEG WITH FAT LAYER EXPOSED: ICD-10-CM

## 2025-07-11 NOTE — PROGRESS NOTES
The patient location is: [place]  The chief complaint leading to consultation is: arthritis and mood     Visit type: audiovisual    Face to Face time with patient: 30 minutes of total time spent on the encounter, which includes face to face time and non-face to face time preparing to see the patient (eg, review of tests), Obtaining and/or reviewing separately obtained history, Documenting clinical information in the electronic or other health record, Independently interpreting results (not separately reported) and communicating results to the patient/family/caregiver, or Care coordination (not separately reported).         Each patient to whom he or she provides medical services by telemedicine is:  (1) informed of the relationship between the physician and patient and the respective role of any other health care provider with respect to management of the patient; and (2) notified that he or she may decline to receive medical services by telemedicine and may withdraw from such care at any time.    Notes:   Routine Office Visit     Patient Name: Hannah Montoya    : 1955  MRN: [id number]    Subjective     History of Present Illness    CHIEF COMPLAINT:  Patient presents today for follow up of pain management and mood.    PAIN MANAGEMENT:  She reports improved pain control with Tramadol with codeine, describing significant effectiveness. She takes Tylenol 500mg every 8 hours around the clock. After two doses of Tylenol, she woke up without pain-induced crying. She expresses reduced anxiety about pain medication and acknowledges the positive impact on her overall mood and quality of life.    MUSCULOSKELETAL:  She has diagnosed arthritis in her right ankle with associated foot swelling. She reports difficulty with fine motor tasks including writing and crocheting, requiring assistance from her son for tasks like writing checks. She has a small, stable scab on the left leg that she plans to cover with a  "bandage.    MENTAL HEALTH:  She continues Zoloft 25mg which she reports is working well for mood management. She feels the current lower dose is sufficient to "take the edge off" mood and pain. She continues care with her psychologist with a follow-up scheduled in two weeks.    UPCOMING APPOINTMENTS:  She has scheduled CTs next week at the main campus, followed by an oncology appointment with potential infusion treatment.      ROS:  General: -fever, -chills, -fatigue, -weight gain, -weight loss  Eyes: -vision changes, -redness, -discharge  ENT: -ear pain, -nasal congestion, -sore throat  Cardiovascular: -chest pain, -palpitations, +lower extremity edema  Respiratory: -cough, -shortness of breath  Gastrointestinal: -abdominal pain, -nausea, -vomiting, -diarrhea, -constipation, -blood in stool  Genitourinary: -dysuria, -hematuria, -frequency  Musculoskeletal: +joint pain, -muscle pain, +limb swelling  Skin: -rash, +lesion, +wound  Neurological: -headache, -dizziness, -numbness, -tingling  Psychiatric: -anxiety, -depression, -sleep difficulty           Objective     LMP  (LMP Unknown)   Physical Exam      Assessment     Assessment & Plan        NON-PRESSURE CHRONIC ULCER OF OTHER PART OF LEFT LOWER LEG WITH FAT LAYER EXPOSED:  - Monitored patient's left leg wound which shows positive healing with 2 tiny scabs, no weeping, redness, swelling or inflammation.  As of now the wound remains closed with two small scabs present  - Patient is keeping the wound site covered with gauze to protect it from cats.  - Advised to continue covering with a square bandage until the scab falls off naturally.  - Explained importance of wound care and protection, particularly with pets in the home.  - Instructed patient to contact the office if a red streak appears on the leg near the wound site.    CHRONIC PAIN MANAGEMENT:  - Patient experiences pain in hands that makes it difficult to sign checks.  - Noted significant improvement with " "current pain management regimen.  - Patient has shown positive response to medications.  - Explained benefits of maintaining regular pain medication schedule to stay ahead of pain.  - These interventions have significantly improved quality of life and ability to engage in daily activities.  - Encouraged to resume normal activities and hobbies (e.g., crocheting, crossword puzzles) as pain allows.    MAJOR DEPRESSIVE DISORDER:  - Patient reports "feeling better and having a good day" with improved mood and greater emotional control.  - Noted positive correlation between pain management and mood improvement.  - Continuing Zoloft 25 mg daily for mood stabilization, which is working well.  - Confirmed upcoming appointment with psychologist in 2 weeks to follow up after starting Zoloft.    FOLLOW-UP:  - Contact the office if anything changes with condition.         Problem List Items Addressed This Visit          Psychiatric    Moderate episode of recurrent major depressive disorder - Primary    Overview   Patient with extensive history of depression  Zoloft since 2014, Wellbutrin [id number]               Orthopedic    Non-pressure chronic ulcer of other part of left lower leg with fat layer exposed     Other Visit Diagnoses         Primary osteoarthritis involving multiple joints                  This note was generated with the assistance of ambient listening technology. Verbal consent was obtained by the patient and accompanying visitor(s) for the recording of patient appointment to facilitate this note. I attest to having reviewed and edited the generated note for accuracy, though some syntax or spelling errors may persist. Please contact the author of this note for any clarification.                        "

## 2025-07-12 ENCOUNTER — CLINICAL SUPPORT (OUTPATIENT)
Dept: CARDIOLOGY | Facility: HOSPITAL | Age: 70
End: 2025-07-12
Attending: INTERNAL MEDICINE
Payer: MEDICARE

## 2025-07-12 ENCOUNTER — CLINICAL SUPPORT (OUTPATIENT)
Dept: CARDIOLOGY | Facility: HOSPITAL | Age: 70
End: 2025-07-12
Payer: MEDICARE

## 2025-07-12 DIAGNOSIS — Z95.810 PRESENCE OF AUTOMATIC (IMPLANTABLE) CARDIAC DEFIBRILLATOR: ICD-10-CM

## 2025-07-12 DIAGNOSIS — I42.9 CARDIOMYOPATHY, UNSPECIFIED: ICD-10-CM

## 2025-07-12 PROCEDURE — 93295 DEV INTERROG REMOTE 1/2/MLT: CPT | Mod: HCNC,,, | Performed by: INTERNAL MEDICINE

## 2025-07-12 PROCEDURE — 93296 REM INTERROG EVL PM/IDS: CPT | Mod: HCNC | Performed by: INTERNAL MEDICINE

## 2025-07-14 ENCOUNTER — TELEPHONE (OUTPATIENT)
Dept: HEMATOLOGY/ONCOLOGY | Facility: CLINIC | Age: 70
End: 2025-07-14
Payer: MEDICARE

## 2025-07-14 DIAGNOSIS — M25.50 POLYARTHRALGIA: ICD-10-CM

## 2025-07-14 NOTE — TELEPHONE ENCOUNTER
Copied from CRM #[id number]. Topic: Appointments - Appointment Access  >> Jul 14, 2025  9:35 AM Tish wrote:  Current Appt date:  07/17/25 and   07/18/25     Type of Appt:  Lab, CT and Est Pt     Physician:   Javi Avina MD     Reason for rescheduling:  Requesting Lab and CT location be changed to [facility] and appt visit type be changed to virtual     Caller:  Hannah      Contact Preference: [phone]  
no complications

## 2025-07-15 PROBLEM — M19.90 INFLAMMATORY ARTHRITIS: Status: ACTIVE | Noted: 2025-07-15

## 2025-07-16 RX ORDER — SULINDAC 200 MG/1
200 TABLET ORAL 2 TIMES DAILY
Qty: 60 TABLET | Refills: 0 | Status: SHIPPED | OUTPATIENT
Start: 2025-07-16

## 2025-07-17 ENCOUNTER — HOSPITAL ENCOUNTER (OUTPATIENT)
Dept: RADIOLOGY | Facility: HOSPITAL | Age: 70
Discharge: HOME | End: 2025-07-17
Attending: NURSE PRACTITIONER
Payer: MEDICARE

## 2025-07-17 DIAGNOSIS — C34.91 NSCLC OF RIGHT LUNG: ICD-10-CM

## 2025-07-17 PROCEDURE — 74177 CT ABD & PELVIS W/CONTRAST: CPT | Mod: 26,HCNC,, | Performed by: RADIOLOGY

## 2025-07-17 PROCEDURE — 25500020 PHARM REV CODE 255: Mod: HCNC | Performed by: NURSE PRACTITIONER

## 2025-07-17 PROCEDURE — 74177 CT ABD & PELVIS W/CONTRAST: CPT | Mod: TC,HCNC

## 2025-07-17 PROCEDURE — 71260 CT THORAX DX C+: CPT | Mod: 26,HCNC,, | Performed by: RADIOLOGY

## 2025-07-17 RX ADMIN — IOHEXOL 85 ML: 350 INJECTION, SOLUTION INTRAVENOUS at 01:07

## 2025-07-18 ENCOUNTER — OFFICE VISIT (OUTPATIENT)
Dept: HEMATOLOGY/ONCOLOGY | Facility: CLINIC | Age: 70
End: 2025-07-18
Attending: NURSE PRACTITIONER
Payer: MEDICARE

## 2025-07-18 DIAGNOSIS — C77.1 SECONDARY AND UNSPECIFIED MALIGNANT NEOPLASM OF INTRATHORACIC LYMPH NODES: ICD-10-CM

## 2025-07-18 DIAGNOSIS — Z85.3 HISTORY OF BREAST CANCER IN FEMALE: ICD-10-CM

## 2025-07-18 DIAGNOSIS — J70.0 RADIATION PNEUMONITIS: ICD-10-CM

## 2025-07-18 DIAGNOSIS — K52.9 COLITIS: ICD-10-CM

## 2025-07-18 DIAGNOSIS — I44.7 LEFT BUNDLE-BRANCH BLOCK: ICD-10-CM

## 2025-07-18 DIAGNOSIS — I82.431 DEEP VEIN THROMBOSIS (DVT) OF POPLITEAL VEIN OF RIGHT LOWER EXTREMITY, UNSPECIFIED CHRONICITY: ICD-10-CM

## 2025-07-18 DIAGNOSIS — M19.90 INFLAMMATORY ARTHRITIS: ICD-10-CM

## 2025-07-18 DIAGNOSIS — C34.91 NSCLC OF RIGHT LUNG: Primary | ICD-10-CM

## 2025-07-18 DIAGNOSIS — I42.0 DILATED CARDIOMYOPATHY: ICD-10-CM

## 2025-07-18 DIAGNOSIS — M89.8X5 LYTIC BONE LESION OF LEFT FEMUR: ICD-10-CM

## 2025-07-18 DIAGNOSIS — I26.99 PULMONARY EMBOLISM, UNSPECIFIED CHRONICITY, UNSPECIFIED PULMONARY EMBOLISM TYPE, UNSPECIFIED WHETHER ACUTE COR PULMONALE PRESENT: ICD-10-CM

## 2025-07-18 DIAGNOSIS — Z95.810 CARDIAC RESYNCHRONIZATION THERAPY DEFIBRILLATOR (CRT-D) IN PLACE: ICD-10-CM

## 2025-07-18 LAB
OHS CV AF BURDEN PERCENT: < 1
OHS CV BIV PACING PERCENT: 96.1 %
OHS CV DC REMOTE DEVICE TYPE: NORMAL
OHS CV RV PACING PERCENT: 96.1 %

## 2025-07-18 NOTE — PROGRESS NOTES
ONCOLOGY FOLLOW UP VISIT.     The patient location is: [place]  The chief complaint leading to consultation is: Re-staging scans for metastatic NSCLC    Visit type: audiovisual    Face to Face time with patient: 15  30 minutes of total time spent on the encounter, which includes face to face time and non-face to face time preparing to see the patient (eg, review of tests), Obtaining and/or reviewing separately obtained history, Documenting clinical information in the electronic or other health record, Independently interpreting results (not separately reported) and communicating results to the patient/family/caregiver, or Care coordination (not separately reported).     Each patient to whom he or she provides medical services by telemedicine is:  (1) informed of the relationship between the physician and patient and the respective role of any other health care provider with respect to management of the patient; and (2) notified that he or she may decline to receive medical services by telemedicine and may withdraw from such care at any time.      Cancer/Stage/TNM:    Cancer Staging   NSCLC of right lung  Staging form: Lung, AJCC 8th Edition  - Clinical stage from 9/29/2020: Stage IIIA (cT3, cN1, cM0) - Signed by Ramo Tucker MD on 10/24/2020  - Clinical stage from 7/31/2024: Stage DEREK (cT3, cN2, cM1a) - Signed by Javi Avina MD on 8/2/2024         Oncology History   NSCLC of right lung   9/29/2020 Cancer Staged    Staging form: Lung, AJCC 8th Edition  - Clinical stage from 9/29/2020: Stage IIIA (cT3, cN1, cM0)     10/14/2020 Initial Diagnosis    NSCLC of right lung     11/17/2020 - 12/30/2020 Radiation Therapy    Treating physician: Harvinder Lara     Site  Technique  Energy  Dose/Fx (Gy)  #Fx  Total Dose (Gy)    RLL Lung  VMAT  6X  2  30 / 30  60         2/6/2023 -  Chemotherapy    Treatment Summary   Plan Name: OP NSCLC NIVOLUMAB 360 MG D1 & D22 WITH IPILIMUMAB 1 MG/KG Q6W PLUS CARBOPLATIN (AUC)  PACLITAXEL Q3W X2 DOSES  Treatment Goal: Control  Status: Active  Start Date: 2/6/2023  End Date: 8/12/2025 (Planned)  Provider: Javi Avina MD  Chemotherapy: CARBOplatin (PARAPLATIN) 525 mg in sodium chloride 0.9% 337.5 mL chemo infusion, 525 mg, Intravenous, Clinic/HOD 1 time, 1 of 1 cycle  Administration: 525 mg (2/6/2023), 490 mg (2/27/2023)  PACLitaxeL (TAXOL) 200 mg/m2 = 396 mg in sodium chloride 0.9% 500 mL chemo infusion, 200 mg/m2 = 396 mg (100 % of original dose 200 mg/m2), Intravenous, Clinic/HOD 1 time, 1 of 1 cycle  Dose modification: 200 mg/m2 (original dose 200 mg/m2, Cycle 1), 200 mg/m2 (original dose 200 mg/m2, Cycle 1)  Administration: 396 mg (2/6/2023), 396 mg (2/27/2023)     6/12/2023 - 6/30/2023 Radiation Therapy    Treating physician: Harvinder Lara    Site Technique Energy Dose/Fx (Gy) #Fx Total Dose (Gy)   RLL Lung RtLung 6X 4 15 / 15 60        7/31/2024 Cancer Staged    Staging form: Lung, AJCC 8th Edition  - Clinical stage from 7/31/2024: Stage DEREK (cT3, cN2, cM1a)          Interval History:   Diarrhea resolved. Patient has persistent arthritis and joint pains. Diffuse joint pains, but R wrist is worst.      ROS:  Review of Systems   Constitutional:  Negative for activity change, appetite change, chills, fatigue, fever and unexpected weight change.   HENT:  Negative for ear pain, facial swelling, hearing loss, mouth sores, nosebleeds, sore throat and trouble swallowing.    Eyes:  Negative for pain, discharge, redness and visual disturbance.   Respiratory:  Negative for cough, chest tightness and shortness of breath.    Cardiovascular:  Negative for chest pain, palpitations and leg swelling.   Gastrointestinal:  Positive for diarrhea (loose). Negative for abdominal distention, abdominal pain, blood in stool, constipation, nausea and vomiting.   Endocrine: Negative for cold intolerance and heat intolerance.   Genitourinary:  Negative for decreased urine volume, difficulty urinating,  dysuria, frequency, hematuria, hot flashes, urgency and vaginal bleeding.   Musculoskeletal:  Negative for arthralgias, gait problem, leg pain and myalgias.   Integumentary:  Negative for pallor, rash and wound.   Allergic/Immunologic: Negative for immunocompromised state.   Neurological:  Negative for dizziness, tremors, weakness, light-headedness, numbness and headaches.   Hematological:  Negative for adenopathy. Does not bruise/bleed easily.   Psychiatric/Behavioral:  Negative for agitation, confusion, dysphoric mood and sleep disturbance. The patient is not nervous/anxious.             A complete 12-point review of systems was reviewed and is negative except as mentioned above.     Past Medical History:   Past Medical History:   Diagnosis Date    AICD (automatic cardioverter/defibrillator) present     Asthma     bronchitis in past    Breast cancer 2016    right    Cardiac pacemaker     Cardiomyopathy     COPD (chronic obstructive pulmonary disease)     Diabetes mellitus, type 2     Hyperglycemia     Hyperlipidemia     Hypertension     Inflammatory arthritis 7/15/2025    Malignant neoplasm of overlapping sites of female breast 2/12/2016    Nuclear sclerosis of both eyes 8/12/2020    Respiratory distress 3/12/2020        Allergies:   Review of patient's allergies indicates:   Allergen Reactions    Taxol [paclitaxel]      Hypersensitivity reaction to taxol, symptoms included shortness of breath, nausea, dizziness, flushing     Carboplatin Other (See Comments)     Itching and hives    Adhesive Rash     tegaderm burns and blistered skin        Medications:   Current Outpatient Medications   Medication Sig Dispense Refill    ACCU-CHEK ALFRED PLUS TEST STRP Strp USE TO TEST THREE TIMES DAILY 200 strip 3    acetaminophen (TYLENOL) 500 MG tablet Take 2 tablets (1,000 mg total) by mouth every 8 (eight) hours as needed for Pain.      albuterol (ACCUNEB) 1.25 mg/3 mL Nebu use 1 AMPULE (3ml) via NEBULIZER  "EVERY 6 HOURS AS NEEDED 300 mL 3    albuterol (VENTOLIN HFA) 90 mcg/actuation inhaler Inhale 2 puffs into the lungs every 4 (four) hours as needed for Wheezing or Shortness of Breath. Rescue 18 g 4    amLODIPine (NORVASC) 5 MG tablet TAKE 1 TABLET BY MOUTH EVERY DAY 90 tablet 2    apixaban (ELIQUIS) 5 mg Tab Take 1 tablet (5 mg total) by mouth 2 (two) times daily. 180 tablet 2    atorvastatin (LIPITOR) 10 MG tablet TAKE 1 TABLET BY MOUTH EVERY DAY 90 tablet 1    BD ULTRA-FINE GIN PEN NEEDLE 32 gauge x 5/32" Ndle For once daily insulin injections 50 each 3    buPROPion (WELLBUTRIN XL) 150 MG TB24 tablet Take 1 tablet (150 mg total) by mouth once daily. 90 tablet 1    cetirizine (ZYRTEC) 10 MG tablet Take 10 mg by mouth every evening.      cholecalciferol, vitamin D3, (VITAMIN D3) 50 mcg (2,000 unit) Tab Take 1 tablet (2,000 Units total) by mouth once daily. 30 tablet 11    diclofenac sodium (VOLTAREN ARTHRITIS PAIN) 1 % Gel Apply 2 g topically 4 (four) times daily. 100 g 0    HYDROcodone-acetaminophen (NORCO) 5-325 mg per tablet Take 1 tablet by mouth every 6 (six) hours as needed for Pain. 14 tablet 0    insulin aspart U-100 (NOVOLOG) 100 unit/mL (3 mL) InPn pen Inject 8 units with breakfast, 9 units with lunch, and 8 units with dinner. Plus sliding scale as needed. TDD 40 units 15 mL 3    insulin glargine U-100, Lantus, (LANTUS SOLOSTAR U-100 INSULIN) 100 unit/mL (3 mL) InPn pen Inject 12 Units into the skin every evening. 15 mL 3    LIDOcaine (LIDODERM) 5 % Place 1 patch onto the skin once daily. Remove & Discard patch within 12 hours or as directed by MD 14 patch 0    losartan (COZAAR) 100 MG tablet TAKE 1 TABLET BY MOUTH EVERY DAY 90 tablet 3    metFORMIN (GLUCOPHAGE) 500 MG tablet Take 1 tablet (500 mg total) by mouth 2 (two) times daily with meals. Needs appointment for future refills 180 tablet 0    metoprolol succinate (TOPROL-XL) 100 MG 24 hr tablet TAKE 1 TABLET BY MOUTH EVERY DAY 90 " "tablet 3    multivitamin (THERAGRAN) per tablet Take 1 tablet by mouth once daily.      ondansetron (ZOFRAN-ODT) 8 MG TbDL Take 1 tablet (8 mg total) by mouth every 8 (eight) hours as needed (nausea/vomiting). Take 1 tablet (8 mg) by mouth every 8 hours as needed for nausea/vomiting. 60 tablet 5    pantoprazole (PROTONIX) 40 MG tablet TAKE 1 TABLET BY MOUTH EVERY DAY 90 tablet 3    pen needle, diabetic 32 gauge x 5/32" Ndle USE AS DIRECTED WITH INSULIN 4 TIMES DAILY 100 each 0    sertraline (ZOLOFT) 25 MG tablet Take 1 tablet (25 mg total) by mouth once daily. 30 tablet 1    sulindac (CLINORIL) 200 MG Tab TAKE 1 TABLET BY MOUTH TWICE A DAY 60 tablet 0    tiotropium (SPIRIVA WITH HANDIHALER) 18 mcg inhalation capsule INHALE THE CONTENTS OF 1 CAPSULE BY MOUTH EVERY DAY 90 capsule 3    tocilizumab 162 mg/0.9 mL PnIj Inject 162 mg into the skin every 14 (fourteen) days. 2 each 5    WIXELA INHUB 250-50 mcg/dose diskus inhaler INHALE 1 PUFF INTO THE LUNGS 2 (TWO) TIMES DAILY. CONTROLLER 180 each 3    furosemide (LASIX) 20 MG tablet Take 1 tablet (20 mg total) by mouth once daily. for 5 days 5 tablet 0     No current facility-administered medications for this visit.     Facility-Administered Medications Ordered in Other Visits   Medication Dose Route Frequency Provider Last Rate Last Admin    fentaNYL injection 25 mcg  25 mcg Intravenous Q5 Min PRKeesha Mirza MD        haloperidol lactate injection 0.5 mg  0.5 mg Intravenous Once PRKeesha Mirza MD        HYDROmorphone injection 0.2 mg  0.2 mg Intravenous Q5 Min PRKeesha Mirza MD        ondansetron injection 4 mg  4 mg Intravenous Once PRKeesha Mirza MD        sodium chloride 0.9% flush 10 mL  10 mL Intravenous PRKeesha Mirza MD            Physical Exam:   LMP  (LMP Unknown)      ECOG Performance Status: (foot note - ECOG PS provided by Eastern Cooperative Oncology Group) 0 - Asymptomatic    Physical Exam  Constitutional:       General: She is not in " acute distress.  HENT:      Head: Normocephalic and atraumatic.   Eyes:      Conjunctiva/sclera: Conjunctivae normal.   Pulmonary:      Effort: Pulmonary effort is normal. No respiratory distress.   Abdominal:      General: There is no distension.      Palpations: Abdomen is soft.      Tenderness: There is no abdominal tenderness.   Musculoskeletal:         General: Normal range of motion.      Cervical back: Normal range of motion and neck supple.   Skin:     General: Skin is warm and dry.      Findings: No rash.   Neurological:      Mental Status: She is alert and oriented to person, place, and time.   Psychiatric:         Mood and Affect: Affect normal.         Judgment: Judgment normal.                  Labs:   Recent Results (from the past 48 hours)   CBC Oncology    Collection Time: 07/17/25  1:04 PM   Result Value Ref Range    WBC 8.55 3.90 - 12.70 K/uL    RBC 3.57 (L) 4.00 - 5.40 M/uL    HGB 7.9 (L) 12.0 - 16.0 gm/dL    HCT 28.4 (L) 37.0 - 48.5 %    MCV 80 (L) 82 - 98 fL    MCH 22.1 (L) 27.0 - 31.0 pg    MCHC 27.8 (L) 32.0 - 36.0 g/dL    RDW 19.9 (H) 11.5 - 14.5 %    Platelet Count 487 (H) 150 - 450 K/uL    MPV 10.7 9.2 - 12.9 fL    Neut # 6.06 1.8 - 7.7 K/uL    Imm Grans # 0.10 (H) 0.00 - 0.04 K/uL   Comprehensive Metabolic Panel    Collection Time: 07/17/25  1:04 PM   Result Value Ref Range    Sodium 140 136 - 145 mmol/L    Potassium 4.8 3.5 - 5.1 mmol/L    Chloride 106 95 - 110 mmol/L    CO2 23 23 - 29 mmol/L    Glucose 121 (H) 70 - 110 mg/dL    BUN 22 8 - 23 mg/dL    Creatinine 1.0 0.5 - 1.4 mg/dL    Calcium 9.6 8.7 - 10.5 mg/dL    Protein Total 7.5 6.0 - 8.4 gm/dL    Albumin 2.1 (L) 3.5 - 5.2 g/dL    Bilirubin Total 0.4 0.1 - 1.0 mg/dL    ALP 93 40 - 150 unit/L    AST 9 (L) 11 - 45 unit/L    ALT 5 (L) 10 - 44 unit/L    Anion Gap 11 8 - 16 mmol/L    eGFR >60 >60 mL/min/1.73/m2   T4, Free    Collection Time: 07/17/25  1:04 PM   Result Value Ref Range    Free T4 1.06 0.71 - 1.51 ng/dL   TSH    Collection  Time: 07/17/25  1:04 PM   Result Value Ref Range    TSH 1.949 0.400 - 4.000 uIU/mL    Free T4 1.06 0.71 - 1.51 ng/dL                    Imaging:   CT Chest Abdomen Pelvis With IV Contrast (XPD) NO Oral Contrast  Narrative: EXAMINATION:  CT CHEST ABDOMEN PELVIS WITH IV CONTRAST (XPD)    CLINICAL HISTORY:  Non-small cell lung cancer (NSCLC), metastatic, assess treatment response; Malignant neoplasm of unspecified part of right bronchus or lung    TECHNIQUE:  Axial images of the chest, abdomen, and pelvis were acquired  after the use of 85 cc [id number] IV contrast.  Coronal and sagittal reconstructions were also obtained    COMPARISON:  CT abdomen pelvis from 05/31/2025 and CT chest from 04/17/2025.    FINDINGS:  Thoracic soft tissues: Normal thyroid. Left-sided cardiac device with leads in the right atrium and right ventricle.    Aorta: Thoracic aorta is normal in caliber and contour with no significant calcific atherosclerosis.    Heart: Normal in size. No pericardial effusion. No significant calcific coronary atherosclerosis.    Larissa/Mediastinum: Precarinal lymph node measuring 9 mm (series 2, image 48).    Pulmonary arteries: Redemonstration of filling defect within the pulmonary artery to the right lower lobe (series 2, image 61), unchanged compared to prior exam.  Findings likely represent chronic pulmonary thromboembolism.  Mass effect from adjacent atelectasis is considered less likely.  No new filling defect is visualized.    Lungs: Trachea and bronchi are patent.  Volume loss in the right hemithorax with basilar consolidation with air bronchograms.  Small right-sided pleural effusion slightly smaller compared to prior exam.  Scarring of the lingula.  Stable 9 mm pulmonary nodule in the left lower lobe, previously measuring 9 mm (series 6, image 385).  No new pulmonary nodules.    Liver: Normal in size and contour.  1.5 cm hypodensity in the gallbladder fossa likely representing focal fatty infiltration,  previously measuring 1.5 cm.    Gallbladder: Gallbladder surgically absent.    Bile Ducts: No evidence of dilated ducts.    Pancreas: No mass or peripancreatic fat stranding.    Spleen: Unremarkable.    Stomach and duodenum: Unremarkable.    Adrenals: Unremarkable.    Kidneys/ Ureters: Normal in size and location. Normal enhancement. No hydronephrosis or nephrolithiasis. No ureteral dilatation.    Bladder: No evidence of wall thickening.    Reproductive organs: Unremarkable.    Bowel/Mesentery: Small bowel is normal in caliber with no evidence of obstruction.  No evidence of inflammation or wall thickening.  Colon demonstrates no focal wall thickening.    Lymph nodes: No lymphadenapathy.    Abdominal wall:  Unremarkable.    Vasculature: No aneurysm. Moderate calcific atherosclerosis.  Calcific atherosclerosis.    Bones: No acute fracture. No suspicious osseous lesions.  Postsurgical changes of the left hip.  Linear soft tissue thickening adjacent to the left hip in the subcutaneous tissue similar to prior exam.  Impression: In patient with history of non-small cell lung cancer, no significant detrimental changes visualized.    Stable atelectasis/consolidation of the right lower lobe without discrete mass.    Stable precarinal lymph node with no lymphadenopathy.    Stable pulmonary nodule in the left lower lobe.    Stable hypoattenuating focus in the liver likely representing focal fatty infiltration.    Filling defect within the pulmonary artery to the right lower lobe likely representing chronic pulmonary thromboembolism.    RECIST SUMMARY:    Date of prior examination for comparison: CT chest abdomen pelvis from 04/17/2025    Lesion 1: Precarinal lymph node.  0.9 cm. Series 2 image 48.  Prior measurement 0.9 cm.    Lesion 2: Right hilar lymph node.  Not visualized on today's exam.    Lesion 3: Posterior mediastinal lymph node.  Not visualized on today's exam.    Lesion 4: Left lower lobe.  0.9 cm. Series 6 image  385.  Prior measurement 0.9 cm.    Electronically signed by: Enrique Avina MD  Date:    07/17/2025  Time:    13:57            Diagnoses:       1. NSCLC of right lung    2. Secondary and unspecified malignant neoplasm of intrathoracic lymph nodes    3. Lytic bone lesion of left femur s/p IM nail on 8/20/2024    4. Colitis    5. Radiation pneumonitis    6. History of breast cancer in female    7. Dilated cardiomyopathy    8. Left bundle-branch block    9. Cardiac resynchronization therapy defibrillator (CRT-D) in place    10. Deep vein thrombosis (DVT) of popliteal vein of right lower extremity, unspecified chronicity    11. Pulmonary embolism, unspecified chronicity, unspecified pulmonary embolism type, unspecified whether acute cor pulmonale present    12. Inflammatory arthritis            Assessment and Plan:         1,2. Recurrent NSCLC  Plan is for definitive chemoRT, will need re-staging scans prior.  Reviewed with patient in detail her diagnosis, stage, treatment options, and prognosis (3 year OS 66% vs. 43.5% without durvalumab) with standard of care chemoRT followed by maintenance immunotherapy.  Patient has consented for [id number] clinical trial, a randomized control trial evaluating combination chemoRT + durvalumab followed by maintenance vs. SOC chemoRT -> maintenance.  CT scans 7/2021 with CR.  - patient randomized on [id number] to SOC arm (Imfinzi) - completed 12/2021.    - CT scan 12/2022 with progressing pulmonary nodule in LLL, still with stable disease in RLL consolidation. PET scan also showing enlarging right lower lobe mass with increased hypermetabolic activity concerning for recurrence. Will present her case at the next thoracic tumor board to discuss biopsy and possible treatment options.   - Biopsy of lung mass consistent with SCC. Initiated on 9LA. Initial re-staging scans with stable disease.   - Patient has completed RT 4-5 weeks ago and has no symptoms. She has some mild inflammation on Chest  CT, but since asymptomatic and completed RT will re-initiate IO.  - Now s/p 3 cycles of 9LA. With persistent IO induced rash and continued pneumonitis on recent imaging, plan to hold IO and proceed with surveillance.  - 10/16/23 CT showing continued evidence of inflammation/infection. Currently has cold symptoms. +Covid.   - Repeat CT scan (preliminary reading) shows improvement in inflammation but indeterminate possible new liver metastases. Will confirm with PETCT.  - CT CAP is showing progression of disease in L hilum, hilar LN, mediastinal LN, and pleural effusion.   - Plan is to restart ipi + nivo. Not eligible for any trials.   - Holding Ipi for colitis. Now holding Opdivo for arthritis. Imaging with continued partial response. Next due in October     3. Bone metastases Recovering from left femur nailing. Has oxy IR for pain. PT/OT ordered. Will get bone scans with re-staging to evaluate for metastatic bone disease.    4. Colitis Grade 4 needing hospitalization. Required multiple days of steroids prior to resolution of symptoms. Resolved with steroids and Entyvio x 4 doses    5. Radiation Pneumonitis  - Resolved. First noted in April 2021 and resolved with steroids. Discussed symptoms to report with re-challenging immunotherapy.      6 History of breast cancer  - Stage 1A hormone positive HER2 negative IDC s/p lumpectomy 2016.      7- 9 Dilated Cardiomyopathy  Left Bundle branch block  AICD in place  - Stable, follows with cardiology. AICD exchanged 11/2024.      10,11 DVT of RLE  PE  - DVT right popliteal vein in October 2024. Now with PE. Patient only took starter package of Eliquis for DVT. Instructed to restart Eliquis and will need to continue for 6 months at least. Will repeat CTA and US in October.      12 Inflammatory Arthritis  - arthrocentesis of left knee was not consistent with CPPD, suspect inflammatory arthritis associated with ICI. Will hold further immunotherapy at this time. Symptoms improved  on steroids, but now recurred. Patient refusing to take any further steroids due to side effects. Prescribed Actemra.    Tocilizumab is the preferred treatment to treat inflammatory arthritis in patients receiving immunotherapy for cancer. TNF-alpha inhibitors and other immunosuppressant treatments can hinder the anti-cancer immune response and have been shown to increase risk for development of cancer. IL-6R blockade has been associated with control of autoimmune inflammation while not abrogating anti-cancer immune response. Tocilizumab has also been shown clinically to be an effective treatment for multiple different immune related adverse events from immune checkpoint inhibitors.    IL-6 blockade preclinical - NILTON Birmingham, JOAN Avina., COURTNEY Espinoza., THERON Agustin., RANJAN Black., CATY Robins., JESSIKA Matos, ALLI Gaming, GETACHEW Velasquez., FILIPE Araiza. and Cynthia, S.T., 2022. Interleukin-6 blockade abrogates immunotherapy toxicity and promotes tumor immunity. Cancer Cell, 40(5), pp.509-523.    Eloina BUCK, et al. Tocilizumab for the management of immune mediated adverse events secondary to PD-1 blockade. Journal of Oncology Pharmacy Practice. 2019 Apr;25(3):551-7.    SHAMIKA Weeks, et al. 2023. Selective immune suppression using interleukin-6 receptor inhibitors for management of immune-related adverse events. Journal for immunotherapy of cancer, 11(6).    Cathy PZONIA., et al. 2025. Tocilizumab provides dual benefits in treating immune checkpoint inhibitor-associated arthritis and preventing relapse during ICI rechallenge: the TAPIR study. Annals of Oncology, 36(1), pp.43-53.  Patient was also seen and examined by Dr. Avina. Patient is in agreement with the proposed treatment plan. All questions were answered to the patient's satisfaction. Pt knows to call clinic if anything is needed before the next clinic visit.    MDM includes:    - Acute or chronic illness or injury that poses a threat to life or bodily function  -  Independent review and explanation of 2 results from unique tests  - Discussion of management and ordering 2 unique tests  - Extensive discussion of treatment and management  - Prescription drug management  - Drug therapy requiring intensive monitoring for toxicity    Javi Avina M.D.  Hematology/Oncology Attending  Director [facility]  [facility]           Route Chart for Scheduling    Med Onc Chart Routing      Follow up with physician 3 months. with labs and CTs a few days prior   Follow up with DANIEL    Infusion scheduling note    Injection scheduling note    Labs CBC and CMP   Scheduling:  Preferred lab:  Lab interval:     Imaging CT chest abdomen pelvis and other   and CT head   Pharmacy appointment    Other referrals              Treatment Plan Information   OP NSCLC NIVOLUMAB 360 MG D1 & D22 WITH IPILIMUMAB 1 MG/KG Q6W PLUS CARBOPLATIN (AUC) PACLITAXEL Q3W X2 DOSES Javi Avina MD   Associated diagnosis: Lung mass   noted on 3/12/2020  Associated diagnosis: NSCLC of right lung Stage IIIA, Stage DEREK cT3, cN1, cM0, cT3, cN2, cM1a noted on 10/14/2020   Line of treatment: First Line  Treatment Goal: Control     Upcoming Treatment Dates - OP NSCLC NIVOLUMAB 360 MG D1 & D22 WITH IPILIMUMAB 1 MG/KG Q6W PLUS CARBOPLATIN (AUC) PACLITAXEL Q3W X2 DOSES    5/20/2025       Immunotherapy       nivolumab 480 mg in 0.9% NaCl 98 mL infusion  6/17/2025       Immunotherapy       nivolumab 480 mg in 0.9% NaCl 98 mL infusion  7/15/2025       Immunotherapy       nivolumab 480 mg in 0.9% NaCl 98 mL infusion  8/12/2025       Immunotherapy       nivolumab 480 mg in 0.9% NaCl 98 mL infusion    Supportive Plan Information  OP ZOLEDRONIC ACID (ZOMETA) Q4W Aide Magaña NP   Associated Diagnosis: Malignant neoplasm metastatic to bone   noted on 8/21/2024   Line of treatment: Supportive Care   Treatment goal: Supportive     Upcoming Treatment Dates - OP ZOLEDRONIC ACID (ZOMETA)  Q4W    9/16/2024       Medications       zoledronic 4 mg/100 mL infusion 4 mg  10/14/2024       Medications       zoledronic 4 mg/100 mL infusion 4 mg  11/11/2024       Medications       zoledronic 4 mg/100 mL infusion 4 mg  12/9/2024       Medications       zoledronic 4 mg/100 mL infusion 4 mg    Therapy Plan Information  ENTYVIO GI for Colitis, noted on 10/1/2024  Pre-Medications  acetaminophen tablet 650 mg  650 mg, Oral, Every visit  cetirizine tablet 10 mg  10 mg, Oral, Every visit  Medications  vedolizumab (ENTYVIO) 300 mg/250 mL NS IVPB  300 mg, Intravenous, Every 8 weeks  PRN Medications  acetaminophen tablet 650 mg  650 mg, Oral, PRN  albuterol-ipratropium 2.5 mg-0.5 mg/3 mL nebulizer solution 3 mL  3 mL, Nebulization, PRN  methylPREDNISolone sodium succinate injection 40 mg  40 mg, Intravenous, PRN  EPINEPHrine (PF) injection 0.3 mg  0.3 mg, Subcutaneous, PRN  Flushes  0.9% NaCl 250 mL flush bag  Intravenous, Every visit  sodium chloride 0.9% flush 10 mL  10 mL, Intravenous, Every visit  heparin, porcine (PF) 100 unit/mL injection flush 500 Units  500 Units, Intravenous, Every visit  alteplase injection 2 mg  2 mg, Intra-Catheter, Every visit      No therapy plan of the specified type found.    No therapy plan of the specified type found.

## 2025-07-21 ENCOUNTER — PATIENT MESSAGE (OUTPATIENT)
Dept: HEMATOLOGY/ONCOLOGY | Facility: CLINIC | Age: 70
End: 2025-07-21
Payer: MEDICARE

## 2025-07-21 ENCOUNTER — PATIENT MESSAGE (OUTPATIENT)
Dept: FAMILY MEDICINE | Facility: CLINIC | Age: 70
End: 2025-07-21
Payer: MEDICARE

## 2025-07-21 ENCOUNTER — OFFICE VISIT (OUTPATIENT)
Dept: PSYCHIATRY | Facility: CLINIC | Age: 70
End: 2025-07-21
Payer: MEDICARE

## 2025-07-21 DIAGNOSIS — S16.1XXA STRAIN OF NECK MUSCLE, INITIAL ENCOUNTER: ICD-10-CM

## 2025-07-21 DIAGNOSIS — F33.1 MODERATE EPISODE OF RECURRENT MAJOR DEPRESSIVE DISORDER: Primary | ICD-10-CM

## 2025-07-21 DIAGNOSIS — C34.91 NSCLC OF RIGHT LUNG: ICD-10-CM

## 2025-07-21 PROCEDURE — 90832 PSYTX W PT 30 MINUTES: CPT | Mod: HCNC,95,, | Performed by: PSYCHOLOGIST

## 2025-07-21 PROCEDURE — 1159F MED LIST DOCD IN RCRD: CPT | Mod: CPTII,HCNC,95, | Performed by: PSYCHOLOGIST

## 2025-07-21 PROCEDURE — 1160F RVW MEDS BY RX/DR IN RCRD: CPT | Mod: CPTII,HCNC,95, | Performed by: PSYCHOLOGIST

## 2025-07-21 PROCEDURE — 3044F HG A1C LEVEL LT 7.0%: CPT | Mod: CPTII,HCNC,95, | Performed by: PSYCHOLOGIST

## 2025-07-21 PROCEDURE — 1157F ADVNC CARE PLAN IN RCRD: CPT | Mod: CPTII,HCNC,95, | Performed by: PSYCHOLOGIST

## 2025-07-21 PROCEDURE — 4010F ACE/ARB THERAPY RXD/TAKEN: CPT | Mod: CPTII,HCNC,95, | Performed by: PSYCHOLOGIST

## 2025-07-21 NOTE — PROGRESS NOTES
Telemedicine PSYCHO-ONCOLOGY NOTE/ Individual Psychotherapy     Consultation started: 7/21/2025 at 3:01 PM   The chief complaint leading to consultation is: adaptation to disease and treatment, depression, anxiety  The patient location is: Patient home in [place], [place] (Provider licensed in [place], [place], [place])  Virtual visit with synchronous audio and video.   Patient alone at the time of consultation     Each patient provided medical services by telemedicine is:  (1) informed of the relationship between the physician and patient and the respective role of any other health care provider with respect to management of the patient; and (2) notified that he or she may decline to receive medical services by telemedicine and may withdraw from such care at any time.    Crisis Disclaimer: Patient was informed that due to the virtual nature of the visit, that if a crisis develops, protocols will be implemented to ensure patient safety, including but not limited to: 1) Initiating a welfare check with local Law Enforcement, 2) Calling 911/National Crisis Hotline, and/or 3) Initiating PEC/CEC procedures.     Date: 7/21/2025   Site of therapist:  [facility]         Therapeutic Intervention: Met with patient.   Outpatient - Behavior modifying psychotherapy 30 min - CPT code 76180 and Outpatient - Supportive psychotherapy 30 min - CPT Code 34911    Chief complaint/reason for encounter: depression, anxiety, and adjustment   Met with patient to evaluate psychosocial adaptation to survivorship of NSCLC  The patient's last visit with me was on 7/8/2025.    Medical History:  Patient Active Problem List   Diagnosis    Hypertension associated with diabetes    Type 2 diabetes mellitus without complication    Seasonal allergies    Left bundle-branch block    Dilated cardiomyopathy    NICM (nonischemic cardiomyopathy)    Lung mass    Refractive error    Nuclear sclerosis of both eyes    Depression    NSCLC of right lung    BMI 31.0-31.9,adult     Abnormal thyroid function test    Moderate episode of recurrent major depressive disorder    Lytic bone lesion of left femur s/p IM nail on 8/20/2024    Hypomagnesemia    Pathological fracture of intertrochanteric section of femur    Anemia    Malignant neoplasm metastatic to bone    Left hip pain    Colitis    Diarrhea    Type 2 diabetes mellitus, with long-term current use of insulin    Presence of automatic (implantable) cardiac defibrillator    Purpura    Chronic obstructive pulmonary disease, unspecified COPD type    Non-pressure chronic ulcer of other part of left lower leg with fat layer exposed    Aorto-iliac atherosclerosis    Secondary and unspecified malignant neoplasm of intrathoracic lymph nodes    Myalgia    Left leg swelling    Immunosuppression    History of colitis    History of CMV    Acute diarrhea    Hyperkalemia    Hypercoagulopathy    Hyponatremia    Adrenal corticosteroid causing adverse effect in therapeutic use    Inflammatory arthritis       Objective:      Hannah Montoya arrived on time for visit by video.  The patient was fully cooperative throughout the session.  Appearance: appropriately dressed and groomed. Visible blisters on lips  Behavior/Cooperation: friendly and cooperative  Speech: normal in rate, volume, and tone and appropriate quality, quantity and organization of sentences  Mood: euthymic  Affect: euthymic  Thought Process: goal-directed, logical  Thought Content: normal,  No delusions or paranoia; did not appear to be responding to internal stimuli during the session  Orientation: grossly intact  Memory: Grossly intact  Attention Span/Concentration: Attends to session without distraction; reports no difficulty  Fund of Knowledge: average  Estimate of Intelligence: average from verbal skills and history  Cognition: grossly intact  Insight: patient has awareness of illness; good insight into own behavior and behavior of others  Judgment: the patient's behavior is  "adequate to circumstances    Current Outpatient Medications   Medication    ACCU-CHEK ALFRED PLUS TEST STRP Strp    acetaminophen (TYLENOL) 500 MG tablet    albuterol (ACCUNEB) 1.25 mg/3 mL Nebu    albuterol (VENTOLIN HFA) 90 mcg/actuation inhaler    amLODIPine (NORVASC) 5 MG tablet    apixaban (ELIQUIS) 5 mg Tab    atorvastatin (LIPITOR) 10 MG tablet    BD ULTRA-FINE GIN PEN NEEDLE 32 gauge x 5/32" Ndle    buPROPion (WELLBUTRIN XL) 150 MG TB24 tablet    cetirizine (ZYRTEC) 10 MG tablet    cholecalciferol, vitamin D3, (VITAMIN D3) 50 mcg (2,000 unit) Tab    diclofenac sodium (VOLTAREN ARTHRITIS PAIN) 1 % Gel    furosemide (LASIX) 20 MG tablet    HYDROcodone-acetaminophen (NORCO) 5-325 mg per tablet    insulin aspart U-100 (NOVOLOG) 100 unit/mL (3 mL) InPn pen    insulin glargine U-100, Lantus, (LANTUS SOLOSTAR U-100 INSULIN) 100 unit/mL (3 mL) InPn pen    LIDOcaine (LIDODERM) 5 %    losartan (COZAAR) 100 MG tablet    metFORMIN (GLUCOPHAGE) 500 MG tablet    metoprolol succinate (TOPROL-XL) 100 MG 24 hr tablet    multivitamin (THERAGRAN) per tablet    ondansetron (ZOFRAN-ODT) 8 MG TbDL    pantoprazole (PROTONIX) 40 MG tablet    pen needle, diabetic 32 gauge x 5/32" Ndle    sertraline (ZOLOFT) 25 MG tablet    sulindac (CLINORIL) 200 MG Tab    tiotropium (SPIRIVA WITH HANDIHALER) 18 mcg inhalation capsule    tocilizumab 162 mg/0.9 mL PnIj    WIXELA INHUB 250-50 mcg/dose diskus inhaler     No current facility-administered medications for this visit.     Facility-Administered Medications Ordered in Other Visits   Medication Frequency    fentaNYL injection 25 mcg Q5 Min PRN    haloperidol lactate injection 0.5 mg Once PRN    HYDROmorphone injection 0.2 mg Q5 Min PRN    ondansetron injection 4 mg Once PRN    sodium chloride 0.9% flush 10 mL PRN       Interval history and content of current session: Patient discussed events and activities since the time of last visit. Discussed current adaptation to disease and treatment " status and family's adaptation to disease and treatment status. Reports to be coping with improvement. She is back on her Zoloft (25 mg, prescribed 7/10/25 by Dr. Chavez) and is already noting benefit. Her mood has improved and irritability decreased. Sleep has improved (12 hours/day- which is typical of her, no naps).  Her pain remains significant, but she is coping better with it. She has returned to many of her activities (crosswords, reading). She has tried to neha, but even a brief period caused rebound pain the next day. She has been referred to rheumatology by Dr. Avina.     Evaluated cognitive response, paying particular attention to negative intrusive thoughts of a persistent and detrimental nature. Thoughts of this type are largely absent.  Identified and evaluated psychosocial and environmental stressors (financial strain). Examined proactive behaviors that may be implemented to minimize or ameliorate psychosocial stressors.  Her bankruptcy is being finalized tomorrow.       Risk parameters:   Patient reports no suicidal ideation  Patient reports no homicidal ideation  Patient reports no self-injurious behavior  Patient reports no violent behavior   Safety needs:  None at this time      Verbal deficits: None     Patient's response to intervention:The patient's response to intervention is accepting.     Progress toward goals and other mental status changes: The patient's progress toward goals is good.      Progress to date:Progress as Expected      Goals from last visit: Met      Patient reported outcomes:      Distress Thermometer:       7/18/2025     3:54 PM 7/18/2025     8:52 AM 7/3/2025    10:55 AM 6/19/2025    12:49 PM 6/17/2025     7:47 AM 6/10/2025     9:14 AM 6/7/2025     1:34 PM   DISTRESS SCREENING   Distress Score 4 4 4 3 4 7 7   Practical Concerns  Taking care of myself Taking care of myself;Finances;Treatment decisions  Taking care of myself;Finances Taking care of myself;Finances;Access to  medicine;Treatment decisions Taking care of myself;Finances;Treatment decisions   Social Concerns  None of these None of these  None of these Communication with health care team None of these   Emotional Concerns  Worry or anxiety;Loss of interest or enjoyment Worry or anxiety;Loss of interest or enjoyment;Fear;Feelings of worthlessness or being a burden  Worry or anxiety;Loneliness Worry or anxiety Worry or anxiety   Spiritual or Jain Concerns  None of these None of these  None of these None of these None of these   Physical Concerns Pain Pain;Loss or change of physical abilities Pain;Sleep;Loss or change of physical abilities  Pain;Sleep;Loss or change of physical abilities Pain;Sleep;Loss or change of physical abilities Pain;Sleep;Loss or change of physical abilities          PHQ-9=  Initial visit: 20    PHQ ANSWERS    Q1. Little interest or pleasure in doing things: More than half the days (07/18/25 0856)  Q2. Feeling down, depressed, or hopeless: Several days (07/18/25 0856)  Q3. Trouble falling or staying asleep, or sleeping too much: Not at all (07/18/25 0856)  Q4. Feeling tired or having little energy: Several days (07/18/25 0856)  Q5. Poor appetite or overeating: Several days (07/18/25 0856)  Q6. Feeling bad about yourself - or that you are a failure or have let yourself or your family down: More than half the days (07/18/25 0856)  Q7. Trouble concentrating on things, such as reading the newspaper or watching television: More than half the days (07/18/25 0856)  Q8. Moving or speaking so slowly that other people could have noticed. Or the opposite - being so fidgety or restless that you have been moving around a lot more than usual: Not at all (07/18/25 0856)  Q9.      PHQ8 Score : 9 (07/18/25 0856)  PHQ-9 Total Score: 9 (07/18/25 0856)        MICAH-7= Initial visit: 18 7/18/2025     8:55 AM   GAD7   1. Feeling nervous, anxious, or on edge? 2   2. Not being able to stop or control worrying? 1   3.  Worrying too much about different things? 1   4. Trouble relaxing? 1   5. Being so restless that it is hard to sit still? 0   6. Becoming easily annoyed or irritable? 1   7. Feeling afraid as if something awful might happen? 2   8. If you checked off any problems, how difficult have these problems made it for you to do your work, take care of things at home, or get along with other people? 2   MICAH-7 Score 8         Client Strengths: verbal, intelligent, successful, good social support, commitment to wellness      Treatment Plan:individual psychotherapy and medication management by physician  Target symptoms: depression  Why chosen therapy is appropriate versus another modality: relevant to diagnosis, evidence based practice  Outcome monitoring methods: self-report, checklist/rating scale  Therapeutic intervention type: behavior modifying psychotherapy, supportive psychotherapy  Prognosis: Fair      Behavioral goals:    Exercise:  possible future referral to OT/Yoga or PT for debility?   Stress management: follow up with rheumatology   Social engagement:   Nutrition:   Smoking Cessation:   Therapy:  antidepressant as per Dr. Chavez    Inform Dr. Avina/Aide about lip blisters        Return to clinic: 2 weeks     Length of Service (minutes direct face-to-face contact): 17      ICD-10-CM ICD-9-CM   1. Moderate episode of recurrent major depressive disorder  F33.1 296.32   2. NSCLC of right lung  C34.91 162.9           Geovanni Alcaraz, PhD  [place] License #[id number]  [place] License #[id number]  [place] License #[id number]

## 2025-07-22 RX ORDER — HYDROCODONE BITARTRATE AND ACETAMINOPHEN 5; 325 MG/1; MG/1
1 TABLET ORAL EVERY 6 HOURS PRN
Qty: 14 TABLET | Refills: 0 | Status: SHIPPED | OUTPATIENT
Start: 2025-07-22

## 2025-08-01 ENCOUNTER — PATIENT OUTREACH (OUTPATIENT)
Dept: ADMINISTRATIVE | Facility: HOSPITAL | Age: 70
End: 2025-08-01
Payer: MEDICARE

## 2025-08-01 DIAGNOSIS — F41.8 ANXIETY WITH DEPRESSION: ICD-10-CM

## 2025-08-01 DIAGNOSIS — Z71.89 COMPLEX CARE COORDINATION: Primary | ICD-10-CM

## 2025-08-01 DIAGNOSIS — E11.69 TYPE 2 DIABETES MELLITUS WITH OTHER SPECIFIED COMPLICATION, WITH LONG-TERM CURRENT USE OF INSULIN: ICD-10-CM

## 2025-08-01 DIAGNOSIS — Z79.4 TYPE 2 DIABETES MELLITUS WITH OTHER SPECIFIED COMPLICATION, WITH LONG-TERM CURRENT USE OF INSULIN: ICD-10-CM

## 2025-08-01 NOTE — PROGRESS NOTES
Outpatient Care Management Eligible - spoke w/ patient, she agreed to enroll in the program. Referral Placed. She was informed about 2 week turnaround foe her enrollment call. She verbalized understanding.     Overdue Diabetes Urine - upcoming appointment is virtual. The patient requests to schedule this when her next A1C is due. Remind me sent.

## 2025-08-01 NOTE — TELEPHONE ENCOUNTER
No care due was identified.  Health [facility] Embedded Care Due Messages. Reference number: [id number].   8/01/2025 10:41:00 AM CDT

## 2025-08-02 NOTE — TELEPHONE ENCOUNTER
Refill Routing Note   Medication(s) are not appropriate for processing by [facility] for the following reason(s):        New or recently adjusted medication    [facility] action(s):  Defer             Appointments  past 12m or future 3m with PCP    Date Provider   Last Visit   7/11/2025 Emanuel Chavez MD   Next Visit   8/8/2025 Emanuel Chavez MD   ED visits in past 90 days: 2        Note composed:6:49 PM 08/02/2025

## 2025-08-04 ENCOUNTER — PATIENT MESSAGE (OUTPATIENT)
Dept: HEMATOLOGY/ONCOLOGY | Facility: CLINIC | Age: 70
End: 2025-08-04
Payer: MEDICARE

## 2025-08-04 RX ORDER — SERTRALINE HYDROCHLORIDE 25 MG/1
25 TABLET, FILM COATED ORAL
Qty: 90 TABLET | Refills: 1 | Status: SHIPPED | OUTPATIENT
Start: 2025-08-04

## 2025-08-05 ENCOUNTER — OFFICE VISIT (OUTPATIENT)
Dept: ENDOCRINOLOGY | Facility: CLINIC | Age: 70
End: 2025-08-05
Payer: MEDICARE

## 2025-08-05 DIAGNOSIS — S16.1XXA STRAIN OF NECK MUSCLE, INITIAL ENCOUNTER: ICD-10-CM

## 2025-08-05 DIAGNOSIS — E11.9 TYPE 2 DIABETES MELLITUS WITHOUT COMPLICATION, WITH LONG-TERM CURRENT USE OF INSULIN: Primary | ICD-10-CM

## 2025-08-05 DIAGNOSIS — R94.6 ABNORMAL THYROID FUNCTION TEST: ICD-10-CM

## 2025-08-05 DIAGNOSIS — Z79.4 TYPE 2 DIABETES MELLITUS WITHOUT COMPLICATION, WITH LONG-TERM CURRENT USE OF INSULIN: Primary | ICD-10-CM

## 2025-08-05 PROCEDURE — 3044F HG A1C LEVEL LT 7.0%: CPT | Mod: CPTII,HCNC,95, | Performed by: INTERNAL MEDICINE

## 2025-08-05 PROCEDURE — 1157F ADVNC CARE PLAN IN RCRD: CPT | Mod: CPTII,HCNC,95, | Performed by: INTERNAL MEDICINE

## 2025-08-05 PROCEDURE — 98006 SYNCH AUDIO-VIDEO EST MOD 30: CPT | Mod: HCNC,95,, | Performed by: INTERNAL MEDICINE

## 2025-08-05 PROCEDURE — 4010F ACE/ARB THERAPY RXD/TAKEN: CPT | Mod: CPTII,HCNC,95, | Performed by: INTERNAL MEDICINE

## 2025-08-05 PROCEDURE — G2211 COMPLEX E/M VISIT ADD ON: HCPCS | Mod: HCNC,95,, | Performed by: INTERNAL MEDICINE

## 2025-08-05 RX ORDER — HYDROCODONE BITARTRATE AND ACETAMINOPHEN 5; 325 MG/1; MG/1
1 TABLET ORAL EVERY 6 HOURS PRN
Qty: 14 TABLET | Refills: 0 | Status: SHIPPED | OUTPATIENT
Start: 2025-08-05

## 2025-08-05 NOTE — ASSESSMENT & PLAN NOTE
Diagnosed ten years ago   Plan to decrease metformin 500 mg one tab daily to avoid GI side effects   Could not tolerate ozempic due to episode of colitis   Currently on MDI:  Lantus 12 units in the evening   Aspart 8/9/8 - asked her to use before meals      A1C in 9/2025  Microalbumin in 9/2025    Previous microalbumin elevated, plan to repeat as A1C has improved from 8.1 --> 5.8%

## 2025-08-05 NOTE — PROGRESS NOTES
"Subjective:      Patient ID: Hannah Montoya is a 69 y.o. female.    Chief Complaint:  No chief complaint on file.      History of Present Illness    Ms. Avina is a 69 year old woman who is here for a follow up visit for type 2 DM, NSCLC previuosly treated with nivolumab, ipilimumab, carboplatin and paclitaxel in the past. Has follow up with Dr. Avina next week for restaging and discussion of therapies.     Started taking norco for pain.     Current DM regimen:  Metformin 500 mg 1 tablet daily --> occasional lightheadedness  Lantus 12 units in the evening   Insulin aspart/novolog 8 units/ 9 units/ 8 units -> does not take insulin if does not eat; generally takes it twice daily (skips lunch or dinner)  Ozempic 0.25 mg weekly - stopped following episode of colitis   Has lost 25 lbs    Eats very healthy, salads, vegetables and chicken   Likes to stir avitia her own food.     Arthritis limits activity levels    Recent Hgb A1C:  Lab Results   Component Value Date    HGBA1C 6.7 (H) 03/21/2025     Glucose Monitoring:  Checks BG two to three times daily: 100 - 140,    Hypoglycemic Episodes:  Denies BG < 70    Screening / DM Complications:  Nephropathy:  ACEi/ARB: Taking, losartan 100 mg daily   Lab Results   Component Value Date    MICALBCREAT 355.0 (H) 10/16/2023       Last Lipid Panel:  Statin: Taking, atorvastatin 10 mg daily  Lab Results   Component Value Date    LDLCALC 57.8 (L) 03/21/2025       Neuropathy:denies n/b/t  Last foot exam Most Recent Foot Exam Date: Not Found  Last eye exam : 01/09/2025;  no laser surgery or DR    B12: not taking   No results found for: "[id number]"           Review of Systems  Denies recent illness     Objective:   Physical Exam  There were no vitals filed for this visit.    BP Readings from Last 3 Encounters:   06/20/25 (!) 143/65   06/19/25 (!) 123/59   06/03/25 130/64     Wt Readings from Last 1 Encounters:   06/20/25 1026 78.2 kg (172 lb 6.4 oz)         There is no height or " weight on file to calculate BMI.    Lab Review:   Lab Results   Component Value Date    HGBA1C 6.7 (H) 03/21/2025     Lab Results   Component Value Date    CHOL 127 03/21/2025    HDL 43 03/21/2025    LDLCALC 57.8 (L) 03/21/2025    TRIG 131 03/21/2025    CHOLHDL 33.9 03/21/2025     Lab Results   Component Value Date     07/17/2025    K 4.8 07/17/2025     07/17/2025    CO2 23 07/17/2025     (H) 07/17/2025    BUN 22 07/17/2025    CREATININE 1.0 07/17/2025    CALCIUM 9.6 07/17/2025    PROT 7.5 07/17/2025    ALBUMIN 2.1 (L) 07/17/2025    BILITOT 0.4 07/17/2025    ALKPHOS 93 07/17/2025    AST 9 (L) 07/17/2025    ALT 5 (L) 07/17/2025    ANIONGAP 11 07/17/2025    ESTGFRAFRICA >60.0 06/30/2022    EGFRNONAA 59.7 (A) 06/30/2022    TSH 1.949 07/17/2025      Latest Reference Range & Units Most Recent   TSH 0.400 - 4.000 uIU/mL 5.131 (H)  8/1/23 08:15   T4 Total 4.5 - 11.5 ug/dL 9.1  2/3/23 12:01   Free T4 0.71 - 1.51 ng/dL 0.86  8/1/23 08:15   Thyroperoxidase Antibodies <6.0 IU/mL <6.0  3/8/23 11:28   (H): Data is abnormally high     Latest Reference Range & Units 02/03/20 08:31   Creatinine, Urine 15.0 - 325.0 mg/dL 269.0   Urine Microalbumin ug/mL 185.0   MICROALB/CREAT RATIO 0.0 - 30.0 ug/mg 68.8 (H)   (H): Data is abnormally high    Assessment and Plan     Type 2 diabetes mellitus without complication  Diagnosed ten years ago   Plan to decrease metformin 500 mg one tab daily to avoid GI side effects   Could not tolerate ozempic due to episode of colitis   Currently on MDI:  Lantus 12 units in the evening   Aspart 8/9/8 - asked her to use before meals      A1C in 9/2025  Microalbumin in 9/2025    Previous microalbumin elevated, plan to repeat as A1C has improved from 8.1 --> 5.8%       Abnormal thyroid function test  Lab Results   Component Value Date    TSH 1.949 07/17/2025   Normal    BMI 31.0-31.9,adult  Eats healthy   Activity level may improve   Continue MDI   Defers GLP1 RA      Physical exam was not  performed and vitals were not obtained due to this being a telemedicine (virtual) visit.    The patient location is: [place]  The chief complaint leading to consultation is:   Visit type: Virtual visit with synchronous audio and video  Total time spent with patient: 17 min    RTC in 6 months      Lanette Olivarez MD

## 2025-08-05 NOTE — PATIENT INSTRUCTIONS
Monitor your blood sugars:  Two to three times daily     Check before meals, take your meal time (novolog) insulin, then eat     Send log in one month

## 2025-08-07 DIAGNOSIS — E11.9 TYPE 2 DIABETES MELLITUS WITHOUT COMPLICATION, WITHOUT LONG-TERM CURRENT USE OF INSULIN: ICD-10-CM

## 2025-08-07 RX ORDER — ATORVASTATIN CALCIUM 10 MG/1
10 TABLET, FILM COATED ORAL
Qty: 90 TABLET | Refills: 2 | Status: SHIPPED | OUTPATIENT
Start: 2025-08-07

## 2025-08-07 NOTE — TELEPHONE ENCOUNTER
No care due was identified.  Health [facility] Embedded Care Due Messages. Reference number: [id number].   8/07/2025 12:34:41 AM CDT

## 2025-08-07 NOTE — TELEPHONE ENCOUNTER
Refill Decision Note   Hannah Montoya  is requesting a refill authorization.  Brief Assessment and Rationale for Refill:  Approve     Medication Therapy Plan:         Comments:     Note composed:9:55 AM 08/07/2025             Appointments     Last Visit   7/11/2025 Emanuel Chavez MD   Next Visit   Visit date not found Emanuel Chavez MD

## 2025-08-13 DIAGNOSIS — M25.50 POLYARTHRALGIA: ICD-10-CM

## 2025-08-13 RX ORDER — SULINDAC 200 MG/1
200 TABLET ORAL 2 TIMES DAILY
Qty: 60 TABLET | Refills: 0 | Status: SHIPPED | OUTPATIENT
Start: 2025-08-13

## 2025-08-19 ENCOUNTER — TELEPHONE (OUTPATIENT)
Dept: PSYCHIATRY | Facility: CLINIC | Age: 70
End: 2025-08-19
Payer: MEDICARE

## 2025-08-20 ENCOUNTER — OFFICE VISIT (OUTPATIENT)
Dept: PSYCHIATRY | Facility: CLINIC | Age: 70
End: 2025-08-20
Payer: MEDICARE

## 2025-08-20 DIAGNOSIS — F33.1 MODERATE EPISODE OF RECURRENT MAJOR DEPRESSIVE DISORDER: Primary | ICD-10-CM

## 2025-08-20 PROCEDURE — 1160F RVW MEDS BY RX/DR IN RCRD: CPT | Mod: CPTII,HCNC,95, | Performed by: PSYCHOLOGIST

## 2025-08-20 PROCEDURE — 3044F HG A1C LEVEL LT 7.0%: CPT | Mod: CPTII,HCNC,95, | Performed by: PSYCHOLOGIST

## 2025-08-20 PROCEDURE — 1157F ADVNC CARE PLAN IN RCRD: CPT | Mod: CPTII,HCNC,95, | Performed by: PSYCHOLOGIST

## 2025-08-20 PROCEDURE — 1159F MED LIST DOCD IN RCRD: CPT | Mod: CPTII,HCNC,95, | Performed by: PSYCHOLOGIST

## 2025-08-20 PROCEDURE — 90834 PSYTX W PT 45 MINUTES: CPT | Mod: HCNC,95,, | Performed by: PSYCHOLOGIST

## 2025-08-20 PROCEDURE — 4010F ACE/ARB THERAPY RXD/TAKEN: CPT | Mod: CPTII,HCNC,95, | Performed by: PSYCHOLOGIST

## 2025-08-21 DIAGNOSIS — S16.1XXA STRAIN OF NECK MUSCLE, INITIAL ENCOUNTER: ICD-10-CM

## 2025-08-22 RX ORDER — HYDROCODONE BITARTRATE AND ACETAMINOPHEN 5; 325 MG/1; MG/1
1 TABLET ORAL EVERY 6 HOURS PRN
Qty: 14 TABLET | Refills: 0 | Status: SHIPPED | OUTPATIENT
Start: 2025-08-22

## 2025-08-25 ENCOUNTER — TELEPHONE (OUTPATIENT)
Dept: HEMATOLOGY/ONCOLOGY | Facility: CLINIC | Age: 70
End: 2025-08-25
Payer: MEDICARE

## 2025-08-27 ENCOUNTER — OFFICE VISIT (OUTPATIENT)
Dept: HEMATOLOGY/ONCOLOGY | Facility: CLINIC | Age: 70
End: 2025-08-27
Payer: MEDICARE

## 2025-08-27 DIAGNOSIS — Z95.810 CARDIAC RESYNCHRONIZATION THERAPY DEFIBRILLATOR (CRT-D) IN PLACE: ICD-10-CM

## 2025-08-27 DIAGNOSIS — I82.431 DEEP VEIN THROMBOSIS (DVT) OF POPLITEAL VEIN OF RIGHT LOWER EXTREMITY, UNSPECIFIED CHRONICITY: ICD-10-CM

## 2025-08-27 DIAGNOSIS — C34.91 NSCLC OF RIGHT LUNG: Primary | ICD-10-CM

## 2025-08-27 DIAGNOSIS — K52.9 COLITIS: ICD-10-CM

## 2025-08-27 DIAGNOSIS — I26.99 PULMONARY EMBOLISM, UNSPECIFIED CHRONICITY, UNSPECIFIED PULMONARY EMBOLISM TYPE, UNSPECIFIED WHETHER ACUTE COR PULMONALE PRESENT: ICD-10-CM

## 2025-08-27 DIAGNOSIS — J70.0 RADIATION PNEUMONITIS: ICD-10-CM

## 2025-08-27 DIAGNOSIS — M19.90 INFLAMMATORY ARTHRITIS: ICD-10-CM

## 2025-08-27 DIAGNOSIS — I42.0 DILATED CARDIOMYOPATHY: ICD-10-CM

## 2025-08-27 DIAGNOSIS — C77.1 SECONDARY AND UNSPECIFIED MALIGNANT NEOPLASM OF INTRATHORACIC LYMPH NODES: ICD-10-CM

## 2025-08-27 DIAGNOSIS — M89.8X5 LYTIC BONE LESION OF LEFT FEMUR: ICD-10-CM

## 2025-08-27 DIAGNOSIS — Z85.3 HISTORY OF BREAST CANCER IN FEMALE: ICD-10-CM

## 2025-08-27 DIAGNOSIS — F32.1 CURRENT MODERATE EPISODE OF MAJOR DEPRESSIVE DISORDER, UNSPECIFIED WHETHER RECURRENT: ICD-10-CM

## 2025-08-27 DIAGNOSIS — I44.7 LEFT BUNDLE-BRANCH BLOCK: ICD-10-CM

## 2025-08-27 RX ORDER — CELECOXIB 100 MG/1
100 CAPSULE ORAL 2 TIMES DAILY
Qty: 60 CAPSULE | Refills: 2 | Status: SHIPPED | OUTPATIENT
Start: 2025-08-27 | End: 2026-08-27

## 2025-08-27 RX ORDER — PREDNISONE 10 MG/1
TABLET ORAL
Qty: 15 TABLET | Refills: 0 | Status: SHIPPED | OUTPATIENT
Start: 2025-08-27 | End: 2025-09-11

## 2025-08-28 ENCOUNTER — PATIENT MESSAGE (OUTPATIENT)
Dept: HEMATOLOGY/ONCOLOGY | Facility: CLINIC | Age: 70
End: 2025-08-28
Payer: MEDICARE

## 2025-08-31 DIAGNOSIS — E11.9 TYPE 2 DIABETES MELLITUS WITHOUT COMPLICATION, WITHOUT LONG-TERM CURRENT USE OF INSULIN: ICD-10-CM

## 2025-08-31 DIAGNOSIS — C34.91 NSCLC OF RIGHT LUNG: ICD-10-CM

## 2025-09-02 ENCOUNTER — OFFICE VISIT (OUTPATIENT)
Dept: PSYCHIATRY | Facility: CLINIC | Age: 70
End: 2025-09-02
Payer: MEDICARE

## 2025-09-02 ENCOUNTER — PATIENT MESSAGE (OUTPATIENT)
Dept: FAMILY MEDICINE | Facility: CLINIC | Age: 70
End: 2025-09-02
Payer: MEDICARE

## 2025-09-02 DIAGNOSIS — C34.91 NSCLC OF RIGHT LUNG: ICD-10-CM

## 2025-09-02 DIAGNOSIS — F33.1 MODERATE EPISODE OF RECURRENT MAJOR DEPRESSIVE DISORDER: Primary | ICD-10-CM

## 2025-09-02 PROCEDURE — 4010F ACE/ARB THERAPY RXD/TAKEN: CPT | Mod: CPTII,HCNC,95, | Performed by: PSYCHOLOGIST

## 2025-09-02 PROCEDURE — 3044F HG A1C LEVEL LT 7.0%: CPT | Mod: CPTII,HCNC,95, | Performed by: PSYCHOLOGIST

## 2025-09-02 PROCEDURE — 90834 PSYTX W PT 45 MINUTES: CPT | Mod: HCNC,95,, | Performed by: PSYCHOLOGIST

## 2025-09-02 PROCEDURE — 1160F RVW MEDS BY RX/DR IN RCRD: CPT | Mod: CPTII,HCNC,95, | Performed by: PSYCHOLOGIST

## 2025-09-02 PROCEDURE — 1157F ADVNC CARE PLAN IN RCRD: CPT | Mod: CPTII,HCNC,95, | Performed by: PSYCHOLOGIST

## 2025-09-02 PROCEDURE — 1159F MED LIST DOCD IN RCRD: CPT | Mod: CPTII,HCNC,95, | Performed by: PSYCHOLOGIST

## 2025-09-02 RX ORDER — ALBUTEROL SULFATE 90 UG/1
2 INHALANT RESPIRATORY (INHALATION) EVERY 4 HOURS PRN
Qty: 18 G | Refills: 11 | Status: SHIPPED | OUTPATIENT
Start: 2025-09-02

## 2025-09-05 ENCOUNTER — PATIENT MESSAGE (OUTPATIENT)
Dept: HEMATOLOGY/ONCOLOGY | Facility: CLINIC | Age: 70
End: 2025-09-05
Payer: MEDICARE

## 2025-09-05 ENCOUNTER — PATIENT MESSAGE (OUTPATIENT)
Dept: ENDOCRINOLOGY | Facility: CLINIC | Age: 70
End: 2025-09-05
Payer: MEDICARE

## (undated) DEVICE — SUT VICRYL PLUS 3-0 SH 18IN

## (undated) DEVICE — SEALER VESSEL EXTEND

## (undated) DEVICE — TRAY MINOR GEN SURG

## (undated) DEVICE — SEE MEDLINE ITEM 157117

## (undated) DEVICE — LUBRICANT SURGILUBE 2 OZ

## (undated) DEVICE — SUT 0 VICRYL / CT-1

## (undated) DEVICE — DRAPE INCISE IOBAN 2 23X17IN

## (undated) DEVICE — DRAPE C ARM 42 X 120 10/BX

## (undated) DEVICE — ELECTRODE REM PLYHSV RETURN 9

## (undated) DEVICE — BLADE SURG CARBON STEEL SZ11

## (undated) DEVICE — CONTAINER SPECIMEN STRL 4OZ

## (undated) DEVICE — DRAPE ABDOMINAL TIBURON 14X11

## (undated) DEVICE — KIT PT CARE HANA PROFX SSXT

## (undated) DEVICE — SET TRI-LUMEN FILTERED TUBE

## (undated) DEVICE — SEE MEDLINE ITEM 146420

## (undated) DEVICE — ELECTRODE EXTENDED BLADE

## (undated) DEVICE — BNDG COFLEX FOAM LF2 ST 4X5YD

## (undated) DEVICE — TRACKER PATIENT VPAD

## (undated) DEVICE — SUT PROLENE 4-0 RB-1 BL MO

## (undated) DEVICE — ADHESIVE DERMABOND ADVANCED

## (undated) DEVICE — TRAY FOLEY 16FR INFECTION CONT

## (undated) DEVICE — DRESSING AQUACEL FOAM 5 X 5

## (undated) DEVICE — SUT VICRYL PLUS 2-0 CT1 18

## (undated) DEVICE — DRAPE SCOPE PILLOW WARMER

## (undated) DEVICE — SUT LIGACLIP SMALL XTRA

## (undated) DEVICE — PENCIL SMK EVAC CONNECTOR 10FT

## (undated) DEVICE — SUT VICRYL PLUS 0 CT1 18IN

## (undated) DEVICE — NDL CYTOLOGY TIP TRACK 21GA

## (undated) DEVICE — SEE MEDLINE ITEM 146313

## (undated) DEVICE — NDL SPINAL 18GX3.5 SPINOCAN

## (undated) DEVICE — SUT VICRYL 3-0 27 SH

## (undated) DEVICE — SUT CTD VICRYL 0 UND BR CT

## (undated) DEVICE — DRESSING TRANS 2X2 TEGADERM

## (undated) DEVICE — ADAPTER SWIVEL

## (undated) DEVICE — PENCIL GOLF STERILE

## (undated) DEVICE — KIT VUETIP TROCAR SWAB

## (undated) DEVICE — GLOVE PI ULTRA TOUCH G SURGEON

## (undated) DEVICE — TRAY MINOR ORTHO OMC

## (undated) DEVICE — COVER LIGHT HANDLE 80/CA

## (undated) DEVICE — SYR SLIP TIP 20CC

## (undated) DEVICE — NDL ASPIRATING VIZISHOT 20-40M

## (undated) DEVICE — SUT MONOCRYL 3-0 PS-2 UND

## (undated) DEVICE — NDL VIZISHOT 2 FLEX 22G

## (undated) DEVICE — Device

## (undated) DEVICE — APPLICATOR CHLORAPREP ORN 26ML

## (undated) DEVICE — DRESSING AQUACEL AG RBBN 2X45

## (undated) DEVICE — GOWN SURGICAL X-LARGE

## (undated) DEVICE — DRESSING TELFA STRL 4X3 LF

## (undated) DEVICE — DRESSING TRANS 6X8 TEGADERM

## (undated) DEVICE — PACK PACER PERMANENT OMC

## (undated) DEVICE — PORT AIRSEAL 12/120MM LPI

## (undated) DEVICE — SEE MEDLINE ITEM 157144

## (undated) DEVICE — DEVICE PLASMABLADE X 3.0S LT

## (undated) DEVICE — SUT VICRYL BR 1 GEN 27 CT-1

## (undated) DEVICE — SUT 2 30IN SILK BLK BRAIDE

## (undated) DEVICE — NDL SPINFLEX TIP TRACKED 22GA

## (undated) DEVICE — CLIP MED TICALL

## (undated) DEVICE — SUT PROLENE 4-0 SH BLU 36IN

## (undated) DEVICE — HEMOSTAT SURGICEL 4X8IN

## (undated) DEVICE — PACK SET UP CONVERTORS

## (undated) DEVICE — TAPE SURG DURAPORE 2 X10YD

## (undated) DEVICE — PAD DEFIB CADENCE ADULT R2

## (undated) DEVICE — SPONGE IV DRAIN 4X4 STERILE

## (undated) DEVICE — DRAPE COLUMN DAVINCI XI

## (undated) DEVICE — STAPLER SKIN PROXIMATE WIDE

## (undated) DEVICE — SPONGE LAP 18X18 PREWASHED

## (undated) DEVICE — GAUZE SPONGE 4X4 12PLY

## (undated) DEVICE — SOL WATER STRL IRR 1000ML

## (undated) DEVICE — BIT DRILL QC 3FLUTED 4.2X145 S

## (undated) DEVICE — NDL BOX COUNTER

## (undated) DEVICE — SUT MCRYL PLUS 4-0 PS2 27IN

## (undated) DEVICE — LOOP VESSEL BLUE MAXI

## (undated) DEVICE — DRESSING TRANS 4X4 TEGADERM

## (undated) DEVICE — SUT 2 27IN COATED VICRYL V

## (undated) DEVICE — SYS LABEL CORRECT MED

## (undated) DEVICE — SUT 0 30IN NUROLON BLK CT-1

## (undated) DEVICE — ALCOHOL BLEND 95%

## (undated) DEVICE — TAPE SILK 3IN

## (undated) DEVICE — WIRE GUIDE 3.2MM 400MM
Type: IMPLANTABLE DEVICE | Site: FEMUR | Status: NON-FUNCTIONAL
Removed: 2024-08-20

## (undated) DEVICE — KIT ANTIFOG

## (undated) DEVICE — DRAPE IOBAN 2 STERI

## (undated) DEVICE — SET DECANTER MEDICHOICE

## (undated) DEVICE — SPONGE COTTON TRAY 4X4IN

## (undated) DEVICE — SYR 10CC LUER LOCK

## (undated) DEVICE — SEE MEDLINE ITEM 152487

## (undated) DEVICE — SUT SILK 0 STRANDS 30IN BLK

## (undated) DEVICE — SPONGE DERMACEA 4X4IN 12PLY

## (undated) DEVICE — GLOVE BIOGEL PI MICRO SZ 7.5

## (undated) DEVICE — DRAPE C-ARMOR EQUIPMENT COVER

## (undated) DEVICE — SEE MEDLINE ITEM 152622

## (undated) DEVICE — BIT DRILL CANN TAPERED 10MM

## (undated) DEVICE — 16MM FLEXIBLE DRILL BIT

## (undated) DEVICE — SUT MONOCYRL 4-0 PS2 UND

## (undated) DEVICE — DRAPE ARM DAVINCI XI

## (undated) DEVICE — DRESSING ADH ISLAND 3.6 X 14

## (undated) DEVICE — TAPE MEDIPORE 4IN X 2YDS

## (undated) DEVICE — DRAPE THREE-QTR REINF 53X77IN

## (undated) DEVICE — CATH BRONCHOSCOPE F/BF

## (undated) DEVICE — SYR 30CC LUER LOCK

## (undated) DEVICE — ELECTRODE BLADE INSULATED 1 IN

## (undated) DEVICE — SPONGE TONSIL LARGE

## (undated) DEVICE — CYTOSPIN COLLECTION FLUID BLT

## (undated) DEVICE — SEE MEDLINE ITEM 146417

## (undated) DEVICE — SUT 2/0 30IN SILK BLK BRAI

## (undated) DEVICE — BLADE ELECTRO EDGE INSULATED

## (undated) DEVICE — PACK DRAPE UNIVERSAL CONVERTOR

## (undated) DEVICE — SEE MEDLINE ITEM 146347

## (undated) DEVICE — SOL NACL 0.9% INJ PF/50151

## (undated) DEVICE — DRAPE THYROID WITH ARMBOARD

## (undated) DEVICE — CATH URETHRAL RED RUBBER 18FR

## (undated) DEVICE — SUT CTD VICRYL CT-1 UND BR

## (undated) DEVICE — KIT WRENCH

## (undated) DEVICE — SEAL UNIVERSAL 5MM-8MM XI

## (undated) DEVICE — DRAPE C-ARM ELAS CLIP 42X120IN

## (undated) DEVICE — DRAIN CHEST DRY SUCTION

## (undated) DEVICE — COVER LIGHT HANDLE

## (undated) DEVICE — SUT SILK 2-0 STRANDS 30IN

## (undated) DEVICE — CLOSURE SKIN STERI STRIP 1/2X4

## (undated) DEVICE — GAUZE SPONGE PEANUT STRL

## (undated) DEVICE — REAMER STEPPED DHS DCS

## (undated) DEVICE — SEE MEDLINE ITEM 157131